# Patient Record
Sex: FEMALE | Race: BLACK OR AFRICAN AMERICAN | Employment: FULL TIME | URBAN - METROPOLITAN AREA
[De-identification: names, ages, dates, MRNs, and addresses within clinical notes are randomized per-mention and may not be internally consistent; named-entity substitution may affect disease eponyms.]

---

## 2017-01-06 ENCOUNTER — HOSPITAL ENCOUNTER (OUTPATIENT)
Dept: MAMMOGRAPHY | Age: 57
Discharge: HOME | End: 2017-01-06
Attending: INTERNAL MEDICINE
Payer: COMMERCIAL

## 2017-01-06 DIAGNOSIS — Z12.31 VISIT FOR SCREENING MAMMOGRAM: ICD-10-CM

## 2017-01-06 PROCEDURE — 77063 BREAST TOMOSYNTHESIS BI: CPT

## 2017-01-10 ENCOUNTER — HOSPITAL ENCOUNTER (EMERGENCY)
Age: 57
Discharge: HOME | End: 2017-01-10
Attending: EMERGENCY MEDICINE
Payer: COMMERCIAL

## 2017-01-10 ENCOUNTER — APPOINTMENT (OUTPATIENT)
Dept: GENERAL RADIOLOGY | Age: 57
End: 2017-01-10
Attending: EMERGENCY MEDICINE
Payer: COMMERCIAL

## 2017-01-10 VITALS
DIASTOLIC BLOOD PRESSURE: 94 MMHG | WEIGHT: 145 LBS | BODY MASS INDEX: 24.89 KG/M2 | HEART RATE: 71 BPM | SYSTOLIC BLOOD PRESSURE: 134 MMHG | TEMPERATURE: 97.7 F | OXYGEN SATURATION: 100 % | RESPIRATION RATE: 16 BRPM

## 2017-01-10 DIAGNOSIS — M25.562 ACUTE PAIN OF LEFT KNEE: Primary | ICD-10-CM

## 2017-01-10 PROCEDURE — 99282 EMERGENCY DEPT VISIT SF MDM: CPT

## 2017-01-10 PROCEDURE — 73564 X-RAY EXAM KNEE 4 OR MORE: CPT

## 2017-01-10 NOTE — ED PROVIDER NOTES
HPI Comments: Patient is a 63 y/o female who presents to the ER c/o left knee pain. Patient states she had a total knee replacement in the summer of 2016. Patient states she slipped and fell on the ice Monday night. She has been able to ambulate but with a slight limp. Patient reports some minor swelling, but controlled with ice. She has also been taking Tramadol as needed. She denied any numbness or tingling, LOC from fall, or other injuries. Patient is a 64 y.o. female presenting with fall. The history is provided by the patient. Fall   The accident occurred 2 days ago. Pertinent negatives include no fever, no abdominal pain, no nausea, no vomiting and no headaches. Past Medical History:   Diagnosis Date    Abdominal adhesions     Anemia     Asthma     Asthma     Diabetes ([address]     Diabetes mellitus     Dyskinesia      bilateral    Essential hypertension     Hypertension     Menopause     Stool color black        Past Surgical History:   Procedure Laterality Date    Tissue localization-excision      Hx hernia repair      Hx cholecystectomy  6/28/10    Hx oophorectomy      Hx hysterectomy       Fibroids    Hx knee replacement           Family History:   Problem Relation Age of Onset    Hypertension Other      parent    Breast Cancer Other 21    Heart Disease Father     Hypertension Father     Hypertension Other      sibling    Diabetes Other      parent       Social History     Social History    Marital status:      Spouse name: N/A    Number of children: N/A    Years of education: N/A     Occupational History    Not on file.      Social History Main Topics    Smoking status: Never Smoker    Smokeless tobacco: Never Used    Alcohol use No    Drug use: No    Sexual activity: Yes     Partners: Male     Birth control/ protection: None     Other Topics Concern    Not on file     Social History Narrative         ALLERGIES: Macrodantin [nitrofurantoin macrocrystalline] and Tape [adhesive]    Review of Systems   Constitutional: Negative for chills, fatigue and fever. HENT: Negative. Negative for sore throat. Eyes: Negative. Respiratory: Negative for cough and shortness of breath. Cardiovascular: Negative for chest pain and palpitations. Gastrointestinal: Negative for abdominal pain, nausea and vomiting. Genitourinary: Negative for dysuria. Musculoskeletal:        Left knee pain     Skin: Negative. Neurological: Negative for dizziness, weakness, light-headedness and headaches. Psychiatric/Behavioral: Negative. All other systems reviewed and are negative. Vitals:    01/10/17 0907   BP: (!) 134/94   Pulse: 71   Resp: 16   Temp: 97.7 °F (36.5 °C)   SpO2: 100%   Weight: 65.8 kg (145 lb)            Physical Exam   Constitutional: She is oriented to person, place, and time. She appears well-developed and well-nourished. HENT:   Head: Normocephalic and atraumatic. Mouth/Throat: Oropharynx is clear and moist.   Eyes: Conjunctivae are normal. No scleral icterus. Neck: Neck supple. No JVD present. No tracheal deviation present. Cardiovascular: Normal rate, regular rhythm and normal heart sounds. Pulmonary/Chest: Effort normal and breath sounds normal. No respiratory distress. She has no wheezes. Abdominal: Soft. Musculoskeletal: Normal range of motion. She exhibits edema and tenderness. Right knee: Normal.        Legs:  Neurological: She is alert and oriented to person, place, and time. She has normal strength. Gait normal. GCS eye subscore is 4. GCS verbal subscore is 5. GCS motor subscore is 6. Skin: Skin is warm and dry. Psychiatric: She has a normal mood and affect. Nursing note and vitals reviewed.        MDM  Number of Diagnoses or Management Options  Diagnosis management comments: 10:16 AM  65 y/o female c/o left knee pain s/p fall on Monday; total knee replacement in summer of 2016 and concerned for further injury. XR negative for fracture or change in replacement location. Discussed results with patient. Will have f/u with Dr. Mitchel Delaney if no improvement in 1 week. Advised to continue using ice as needed for swelling. All questions answered and patient in agreement with plan of care. Will plan for discharge. Shirin Parekh PA-C    Clinical Impression:  Left knee pain, elevated blood pressure    One or more blood pressure readings were noted elevated during the Pt's presentation in the emergency department this date. This abnormal reading has been cited in the Pt's diagnosis, and they have been encouraged to follow up with their primary care physician, or referred to a consultant for further evaluation and treatment.           Amount and/or Complexity of Data Reviewed  Tests in the radiology section of CPT®: ordered and reviewed    Risk of Complications, Morbidity, and/or Mortality  Presenting problems: low  Diagnostic procedures: low  Management options: low    Critical Care  Total time providing critical care: < 30 minutes    Patient Progress  Patient progress: stable    ED Course       Procedures

## 2017-01-10 NOTE — DISCHARGE INSTRUCTIONS
Joint Pain: Care Instructions  Your Care Instructions  Many people have small aches and pains from overuse or injury to muscles and joints. Joint injuries often happen during sports or recreation, work tasks, or projects around the home. An overuse injury can happen when you put too much stress on a joint or when you do an activity that stresses the joint over and over, such as using the computer or rowing a boat. You can take action at home to help your muscles and joints get better. You should feel better in 1 to 2 weeks, but it can take 3 months or more to heal completely. Follow-up care is a key part of your treatment and safety. Be sure to make and go to all appointments, and call your doctor if you are having problems. It's also a good idea to know your test results and keep a list of the medicines you take. How can you care for yourself at home? · Do not put weight on the injured joint for at least a day or two. · For the first day or two after an injury, do not take hot showers or baths, and do not use hot packs. The heat could make swelling worse. · Put ice or a cold pack on the sore joint for 10 to 20 minutes at a time. Try to do this every 1 to 2 hours for the next 3 days (when you are awake) or until the swelling goes down. Put a thin cloth between the ice and your skin. · Wrap the injury in an elastic bandage. Do not wrap it too tightly because this can cause more swelling. · Prop up the sore joint on a pillow when you ice it or anytime you sit or lie down during the next 3 days. Try to keep it above the level of your heart. This will help reduce swelling. · Take an over-the-counter pain medicine, such as acetaminophen (Tylenol), ibuprofen (Advil, Motrin), or naproxen (Aleve). Read and follow all instructions on the label. · After 1 or 2 days of rest, begin moving the joint gently.  While the joint is still healing, you can begin to exercise using activities that do not strain or hurt the painful joint. When should you call for help? Call your doctor now or seek immediate medical care if:  · You have signs of infection, such as:  ¨ Increased pain, swelling, warmth, and redness. ¨ Red streaks leading from the joint. ¨ A fever. Watch closely for changes in your health, and be sure to contact your doctor if:  · Your movement or symptoms are not getting better after 1 to 2 weeks of home treatment. Where can you learn more? Go to http://betzy-didi.info/. Enter P205 in the search box to learn more about \"Joint Pain: Care Instructions. \"  Current as of: May 23, 2016  Content Version: 11.1  © 3783-9499 [employer]. Care instructions adapted under license by [employer] (which disclaims liability or warranty for this information). If you have questions about a medical condition or this instruction, always ask your healthcare professional. [place] [address] any warranty or liability for your use of this information. DASH Diet: Care Instructions  Your Care Instructions  The DASH diet is an eating plan that can help lower your blood pressure. DASH stands for Dietary Approaches to Stop Hypertension. Hypertension is high blood pressure. The DASH diet focuses on eating foods that are high in calcium, potassium, and magnesium. These nutrients can lower blood pressure. The foods that are highest in these nutrients are fruits, vegetables, low-fat dairy products, nuts, seeds, and legumes. But taking calcium, potassium, and magnesium supplements instead of eating foods that are high in those nutrients does not have the same effect. The DASH diet also includes whole grains, fish, and poultry. The DASH diet is one of several lifestyle changes your doctor may recommend to lower your high blood pressure. Your doctor may also want you to decrease the amount of sodium in your diet.  Lowering sodium while following the DASH diet can lower blood pressure even further than just the DASH diet alone. Follow-up care is a key part of your treatment and safety. Be sure to make and go to all appointments, and call your doctor if you are having problems. It's also a good idea to know your test results and keep a list of the medicines you take. How can you care for yourself at home? Following the DASH diet  · Eat 4 to 5 servings of fruit each day. A serving is 1 medium-sized piece of fruit, ½ cup chopped or canned fruit, 1/4 cup dried fruit, or 4 ounces (½ cup) of fruit juice. Choose fruit more often than fruit juice. · Eat 4 to 5 servings of vegetables each day. A serving is 1 cup of lettuce or raw leafy vegetables, ½ cup of chopped or cooked vegetables, or 4 ounces (½ cup) of vegetable juice. Choose vegetables more often than vegetable juice. · Get 2 to 3 servings of low-fat and fat-free dairy each day. A serving is 8 ounces of milk, 1 cup of yogurt, or 1 ½ ounces of cheese. · Eat 6 to 8 servings of grains each day. A serving is 1 slice of bread, 1 ounce of dry cereal, or ½ cup of cooked rice, pasta, or cooked cereal. Try to choose whole-grain products as much as possible. · Limit lean meat, poultry, and fish to 2 servings each day. A serving is 3 ounces, about the size of a deck of cards. · Eat 4 to 5 servings of nuts, seeds, and legumes (cooked dried beans, lentils, and split peas) each week. A serving is 1/3 cup of nuts, 2 tablespoons of seeds, or ½ cup of cooked beans or peas. · Limit fats and oils to 2 to 3 servings each day. A serving is 1 teaspoon of vegetable oil or 2 tablespoons of salad dressing. · Limit sweets and added sugars to 5 servings or less a week. A serving is 1 tablespoon jelly or jam, ½ cup sorbet, or 1 cup of lemonade. · Eat less than 2,300 milligrams (mg) of sodium a day. If you limit your sodium to 1,500 mg a day, you can lower your blood pressure even more. Tips for success  · Start small.  Do not try to make dramatic changes to your diet all at once. You might feel that you are missing out on your favorite foods and then be more likely to not follow the plan. Make small changes, and stick with them. Once those changes become habit, add a few more changes. · Try some of the following:  ¨ Make it a goal to eat a fruit or vegetable at every meal and at snacks. This will make it easy to get the recommended amount of fruits and vegetables each day. ¨ Try yogurt topped with fruit and nuts for a snack or healthy dessert. ¨ Add lettuce, tomato, cucumber, and onion to sandwiches. ¨ Combine a ready-made pizza crust with low-fat mozzarella cheese and lots of vegetable toppings. Try using tomatoes, squash, spinach, broccoli, carrots, cauliflower, and onions. ¨ Have a variety of cut-up vegetables with a low-fat dip as an appetizer instead of chips and dip. ¨ Sprinkle sunflower seeds or chopped almonds over salads. Or try adding chopped walnuts or almonds to cooked vegetables. ¨ Try some vegetarian meals using beans and peas. Add garbanzo or kidney beans to salads. Make burritos and tacos with mashed alamo beans or black beans. Where can you learn more? Go to http://betzy-didi.info/. Enter I924 in the search box to learn more about \"DASH Diet: Care Instructions. \"  Current as of: March 23, 2016  Content Version: 11.1  © 9381-3115 [employer]. Care instructions adapted under license by [employer] (which disclaims liability or warranty for this information). If you have questions about a medical condition or this instruction, always ask your healthcare professional. [address] any warranty or liability for your use of this information.

## 2017-01-22 ENCOUNTER — HOSPITAL ENCOUNTER (EMERGENCY)
Age: 57
Discharge: HOME | End: 2017-01-22
Attending: EMERGENCY MEDICINE
Payer: COMMERCIAL

## 2017-01-22 ENCOUNTER — APPOINTMENT (OUTPATIENT)
Dept: CT IMAGING | Age: 57
End: 2017-01-22
Attending: NURSE PRACTITIONER
Payer: COMMERCIAL

## 2017-01-22 VITALS
RESPIRATION RATE: 16 BRPM | WEIGHT: 150 LBS | BODY MASS INDEX: 25.75 KG/M2 | OXYGEN SATURATION: 100 % | HEART RATE: 62 BPM | TEMPERATURE: 97.9 F | SYSTOLIC BLOOD PRESSURE: 126 MMHG | DIASTOLIC BLOOD PRESSURE: 91 MMHG

## 2017-01-22 DIAGNOSIS — R10.31 ABDOMINAL PAIN, RIGHT LOWER QUADRANT: Primary | ICD-10-CM

## 2017-01-22 LAB
ALBUMIN SERPL BCP-MCNC: 3.7 G/DL (ref 3.4–5)
ALBUMIN/GLOB SERPL: 1 {RATIO} (ref 0.8–1.7)
ALP SERPL-CCNC: 150 U/L (ref 45–117)
ALT SERPL-CCNC: 25 U/L (ref 13–56)
ANION GAP BLD CALC-SCNC: 16 MMOL/L (ref 10–20)
ANION GAP BLD CALC-SCNC: 7 MMOL/L (ref 3–18)
APPEARANCE UR: ABNORMAL
AST SERPL W P-5'-P-CCNC: 16 U/L (ref 15–37)
BACTERIA URNS QL MICRO: NEGATIVE /HPF
BASOPHILS # BLD AUTO: 0 K/UL (ref 0–0.06)
BASOPHILS # BLD: 1 % (ref 0–2)
BILIRUB DIRECT SERPL-MCNC: 0.1 MG/DL (ref 0–0.2)
BILIRUB SERPL-MCNC: 0.4 MG/DL (ref 0.2–1)
BILIRUB UR QL: NEGATIVE
BUN BLD-MCNC: 20 MG/DL (ref 7–18)
BUN SERPL-MCNC: 20 MG/DL (ref 7–18)
BUN/CREAT SERPL: 17 (ref 12–20)
CA-I BLD-MCNC: 1.2 MMOL/L (ref 1.12–1.32)
CALCIUM SERPL-MCNC: 9.3 MG/DL (ref 8.5–10.1)
CHLORIDE BLD-SCNC: 104 MMOL/L (ref 100–108)
CHLORIDE SERPL-SCNC: 102 MMOL/L (ref 100–108)
CO2 BLD-SCNC: 27 MMOL/L (ref 19–24)
CO2 SERPL-SCNC: 30 MMOL/L (ref 21–32)
COLOR UR: YELLOW
CREAT SERPL-MCNC: 1.19 MG/DL (ref 0.6–1.3)
CREAT UR-MCNC: 1.1 MG/DL (ref 0.6–1.3)
DIFFERENTIAL METHOD BLD: NORMAL
EOSINOPHIL # BLD: 0.2 K/UL (ref 0–0.4)
EOSINOPHIL NFR BLD: 3 % (ref 0–5)
EPITH CASTS URNS QL MICRO: NORMAL /LPF (ref 0–5)
ERYTHROCYTE [DISTWIDTH] IN BLOOD BY AUTOMATED COUNT: 13.1 % (ref 11.6–14.5)
GLOBULIN SER CALC-MCNC: 3.6 G/DL (ref 2–4)
GLUCOSE BLD STRIP.AUTO-MCNC: 124 MG/DL (ref 74–106)
GLUCOSE SERPL-MCNC: 121 MG/DL (ref 74–99)
GLUCOSE UR STRIP.AUTO-MCNC: >1000 MG/DL
HCT VFR BLD AUTO: 43 % (ref 35–45)
HCT VFR BLD CALC: 44 % (ref 36–49)
HGB BLD-MCNC: 14.5 G/DL (ref 12–16)
HGB BLD-MCNC: 15 G/DL (ref 12–16)
HGB UR QL STRIP: ABNORMAL
KETONES UR QL STRIP.AUTO: NEGATIVE MG/DL
LEUKOCYTE ESTERASE UR QL STRIP.AUTO: NEGATIVE
LIPASE SERPL-CCNC: 214 U/L (ref 73–393)
LYMPHOCYTES # BLD AUTO: 35 % (ref 21–52)
LYMPHOCYTES # BLD: 1.9 K/UL (ref 0.9–3.6)
MCH RBC QN AUTO: 29.6 PG (ref 24–34)
MCHC RBC AUTO-ENTMCNC: 33.7 G/DL (ref 31–37)
MCV RBC AUTO: 87.8 FL (ref 74–97)
MONOCYTES # BLD: 0.5 K/UL (ref 0.05–1.2)
MONOCYTES NFR BLD AUTO: 9 % (ref 3–10)
NEUTS SEG # BLD: 2.9 K/UL (ref 1.8–8)
NEUTS SEG NFR BLD AUTO: 52 % (ref 40–73)
NITRITE UR QL STRIP.AUTO: NEGATIVE
PH UR STRIP: 6.5 [PH] (ref 5–8)
PLATELET # BLD AUTO: 245 K/UL (ref 135–420)
PMV BLD AUTO: 11.1 FL (ref 9.2–11.8)
POTASSIUM BLD-SCNC: 3.9 MMOL/L (ref 3.5–5.5)
POTASSIUM SERPL-SCNC: 3.8 MMOL/L (ref 3.5–5.5)
PROT SERPL-MCNC: 7.3 G/DL (ref 6.4–8.2)
PROT UR STRIP-MCNC: NEGATIVE MG/DL
RBC # BLD AUTO: 4.9 M/UL (ref 4.2–5.3)
RBC #/AREA URNS HPF: NORMAL /HPF (ref 0–5)
SODIUM BLD-SCNC: 142 MMOL/L (ref 136–145)
SODIUM SERPL-SCNC: 139 MMOL/L (ref 136–145)
SP GR UR REFRACTOMETRY: 1.02 (ref 1–1.03)
UROBILINOGEN UR QL STRIP.AUTO: 0.2 EU/DL (ref 0.2–1)
WBC # BLD AUTO: 5.5 K/UL (ref 4.6–13.2)
WBC URNS QL MICRO: NEGATIVE /HPF (ref 0–4)

## 2017-01-22 PROCEDURE — 81001 URINALYSIS AUTO W/SCOPE: CPT | Performed by: NURSE PRACTITIONER

## 2017-01-22 PROCEDURE — 96376 TX/PRO/DX INJ SAME DRUG ADON: CPT

## 2017-01-22 PROCEDURE — 74011250636 HC RX REV CODE- 250/636: Performed by: NURSE PRACTITIONER

## 2017-01-22 PROCEDURE — 99284 EMERGENCY DEPT VISIT MOD MDM: CPT

## 2017-01-22 PROCEDURE — 83690 ASSAY OF LIPASE: CPT | Performed by: NURSE PRACTITIONER

## 2017-01-22 PROCEDURE — 80048 BASIC METABOLIC PNL TOTAL CA: CPT | Performed by: NURSE PRACTITIONER

## 2017-01-22 PROCEDURE — 80076 HEPATIC FUNCTION PANEL: CPT | Performed by: NURSE PRACTITIONER

## 2017-01-22 PROCEDURE — 96361 HYDRATE IV INFUSION ADD-ON: CPT

## 2017-01-22 PROCEDURE — 96374 THER/PROPH/DIAG INJ IV PUSH: CPT

## 2017-01-22 PROCEDURE — 85025 COMPLETE CBC W/AUTO DIFF WBC: CPT | Performed by: NURSE PRACTITIONER

## 2017-01-22 PROCEDURE — 80047 BASIC METABLC PNL IONIZED CA: CPT

## 2017-01-22 PROCEDURE — 74177 CT ABD & PELVIS W/CONTRAST: CPT

## 2017-01-22 PROCEDURE — 74011636320 HC RX REV CODE- 636/320: Performed by: EMERGENCY MEDICINE

## 2017-01-22 RX ORDER — KETOROLAC TROMETHAMINE 30 MG/ML
30 INJECTION, SOLUTION INTRAMUSCULAR; INTRAVENOUS
Status: COMPLETED | OUTPATIENT
Start: 2017-01-22 | End: 2017-01-22

## 2017-01-22 RX ORDER — DOCUSATE SODIUM 100 MG/1
100 CAPSULE, LIQUID FILLED ORAL 2 TIMES DAILY
Qty: 60 CAP | Refills: 2 | Status: SHIPPED | OUTPATIENT
Start: 2017-01-22 | End: 2017-04-22

## 2017-01-22 RX ORDER — OXYCODONE AND ACETAMINOPHEN 5; 325 MG/1; MG/1
1 TABLET ORAL
Qty: 20 TAB | Refills: 0 | Status: SHIPPED | OUTPATIENT
Start: 2017-01-22 | End: 2017-01-29

## 2017-01-22 RX ORDER — ONDANSETRON 4 MG/1
4 TABLET, FILM COATED ORAL
Qty: 20 TAB | Refills: 0 | Status: SHIPPED | OUTPATIENT
Start: 2017-01-22 | End: 2017-04-18

## 2017-01-22 RX ORDER — POLYETHYLENE GLYCOL 3350 17 G/17G
17 POWDER, FOR SOLUTION ORAL DAILY
Qty: 238 G | Refills: 0 | Status: SHIPPED | OUTPATIENT
Start: 2017-01-22 | End: 2017-01-22

## 2017-01-22 RX ORDER — POLYETHYLENE GLYCOL 3350 17 G/17G
17 POWDER, FOR SOLUTION ORAL DAILY
Qty: 238 G | Refills: 0 | Status: SHIPPED | OUTPATIENT
Start: 2017-01-22 | End: 2017-03-02

## 2017-01-22 RX ADMIN — KETOROLAC TROMETHAMINE 30 MG: 30 INJECTION, SOLUTION INTRAMUSCULAR at 19:26

## 2017-01-22 RX ADMIN — SODIUM CHLORIDE 1000 ML: 900 INJECTION, SOLUTION INTRAVENOUS at 13:41

## 2017-01-22 RX ADMIN — KETOROLAC TROMETHAMINE 30 MG: 30 INJECTION, SOLUTION INTRAMUSCULAR at 14:17

## 2017-01-22 RX ADMIN — IOPAMIDOL 90 ML: 612 INJECTION, SOLUTION INTRAVENOUS at 16:28

## 2017-01-22 RX ADMIN — SODIUM CHLORIDE 1000 ML: 900 INJECTION, SOLUTION INTRAVENOUS at 14:56

## 2017-01-22 NOTE — ED PROVIDER NOTES
HPI Comments: 1:21 PM Tierney Cristobal is a 64 y.o. Female with a history of diabetes, hypertension, and asthma presenting to the ED with RLQ pain that radiates to the R flank that began 3 day ago. The patient also reports increased urinary frequency and constipation as associated symptoms. Her last BM was 3 days ago. Pt states that she has experienced nausea with her symptoms and that she has had no appetite today. Pain in area is exacerbated by palpation and the \"bumpy car ride\" here. Pt denies vomiting, diarrhea, fever, CP, and cough. Patient is a 64 y.o. female presenting with flank pain and frequency. The history is provided by the patient. Flank Pain    Associated symptoms include abdominal pain. Pertinent negatives include no chest pain, no fever, no numbness, no headaches and no dysuria. Urinary Frequency    Associated symptoms include frequency, flank pain and abdominal pain. Pertinent negatives include no chills, no nausea, no vomiting, no hematuria, no urgency, no vaginal discharge and no back pain.         Past Medical History:   Diagnosis Date    Abdominal adhesions     Anemia     Asthma     Asthma     Diabetes ([address]     Diabetes mellitus     Dyskinesia      bilateral    Essential hypertension     Hypertension     Menopause     Stool color black        Past Surgical History:   Procedure Laterality Date    Tissue localization-excision      Hx hernia repair      Hx cholecystectomy  6/28/10    Hx oophorectomy      Hx hysterectomy       Fibroids    Hx knee replacement           Family History:   Problem Relation Age of Onset    Hypertension Other      parent    Breast Cancer Other 21    Heart Disease Father     Hypertension Father     Hypertension Other      sibling    Diabetes Other      parent       Social History     Social History    Marital status:      Spouse name: N/A    Number of children: N/A    Years of education: N/A Occupational History    Not on file. Social History Main Topics    Smoking status: Never Smoker    Smokeless tobacco: Never Used    Alcohol use No    Drug use: No    Sexual activity: Yes     Partners: Male     Birth control/ protection: None     Other Topics Concern    Not on file     Social History Narrative         ALLERGIES: Macrodantin [nitrofurantoin macrocrystalline] and Tape [adhesive]    Review of Systems   Constitutional: Negative for appetite change, chills and fever. HENT: Negative for congestion, sinus pressure and trouble swallowing. Eyes: Negative for pain and visual disturbance. Respiratory: Negative for cough, chest tightness, shortness of breath and wheezing. Cardiovascular: Negative for chest pain, palpitations and leg swelling. Gastrointestinal: Positive for abdominal pain and constipation. Negative for diarrhea, nausea and vomiting. RLQ pain to direct pressure and rebound. + obturator sign   Endocrine: Negative. Genitourinary: Positive for flank pain and frequency. Negative for difficulty urinating, dysuria, hematuria, urgency and vaginal discharge. Musculoskeletal: Negative for arthralgias, back pain, myalgias and neck pain. Skin: Negative. Allergic/Immunologic: Negative. Neurological: Negative for dizziness, syncope, numbness and headaches. Hematological: Negative. Psychiatric/Behavioral: Negative. All other systems reviewed and are negative. There were no vitals filed for this visit. Physical Exam   Constitutional: She is oriented to person, place, and time. She appears well-developed and well-nourished. No distress. HENT:   Head: Normocephalic and atraumatic. Right Ear: External ear normal.   Left Ear: External ear normal.   Mouth/Throat: Oropharynx is clear and moist.   Eyes: Conjunctivae and EOM are normal. Pupils are equal, round, and reactive to light. Neck: Normal range of motion. Neck supple. No JVD present.  No tracheal deviation present. No thyromegaly present. Cardiovascular: Normal rate, regular rhythm and normal heart sounds. Exam reveals no gallop and no friction rub. No murmur heard. Pulmonary/Chest: Effort normal and breath sounds normal. No stridor. No respiratory distress. She has no wheezes. She has no rales. She exhibits no tenderness. Abdominal: Soft. Bowel sounds are normal. She exhibits no distension. There is tenderness. There is rebound. RlQ pain to direct palpation with rebound pain. +obturator's sign   Musculoskeletal: Normal range of motion. She exhibits no edema or tenderness. Lymphadenopathy:     She has no cervical adenopathy. Neurological: She is alert and oriented to person, place, and time. No cranial nerve deficit. Skin: Skin is warm. She is not diaphoretic. Psychiatric: She has a normal mood and affect. Her behavior is normal. Judgment and thought content normal.   Nursing note and vitals reviewed. MDM  Number of Diagnoses or Management Options  Abdominal pain, right lower quadrant:   Diagnosis management comments: Pt with significant RLQ pain to direct palpation with rebound tenderness, +heel jar and obturator's sign, low grade temp and anorexia-all suspicious for appendicitis. Mild elevated BUN, creatinine without history of renal disease-discussed with CT tech who stated to iv hydrate pre and post CT-Pt received 1L NS pre CT. Pain is mildly improved with Toradol    Ct prelim report- appendix not completely visualized but no secondary signs of inflammation or stranding. Discussed with Dr. Maria T Martin and attending rad read requested. Pt  in RLQ but seems to also involve right upper quadrant too. Pt has not had bowel movement in 3 days. She is afebrile at this point. 7:12 PM  Called rad resident Dr. Susanne Ryan, who stated that previous rad resident discussed with Dr. Hannah Cuellar, who concurred with rad resident reading.  Again discussed with Dr. Maria T Martin and requested he examine pt which he did and instructed to discharge patient. Pt given return instructions if pain persists in 24 hours or if fever develops for recheck for possible early appendicitis. Pt verbalized understanding of instructions and agrees. Amount and/or Complexity of Data Reviewed  Clinical lab tests: ordered and reviewed  Tests in the radiology section of CPT®: ordered and reviewed    Risk of Complications, Morbidity, and/or Mortality  Presenting problems: moderate  Diagnostic procedures: moderate  Management options: moderate    Patient Progress  Patient progress: stable    ED Course       Procedures    Vitals:  Patient Vitals for the past 12 hrs:   Temp Pulse Resp BP SpO2   01/22/17 1322 99.5 °F (37.5 °C) 85 16 118/85 100 %         Medications ordered:   Medications - No data to display      Lab findings:  No results found for this or any previous visit (from the past 12 hour(s)). EKG interpretation by ED Physician:      X-Ray, CT or other radiology findings or impressions:  No orders to display       Progress notes, Consult notes or additional Procedure notes:       Reevaluation of patient:       Disposition:  Diagnosis: No diagnosis found. Disposition:     Follow-up Information     None             SCRIBE ATTESTATION STATEMENT  Documented by: Matty strong for, and in the presence of, Lise Arzola NP 1:24 PM       Signed by: Jeremy Adams, 01/22/17      PROVIDER ATTESTATION STATEMENT  I personally performed the services described in the documentation, reviewed the documentation, as recorded by the scribe in my presence, and it accurately and completely records my words and actions. Lise Arzola NP                         Diagnosis:   1.  Abdominal pain, right lower quadrant          Disposition: home      Follow-up Information     Follow up With Details Comments Contact Info    N Janet Severs, MD Schedule an appointment as soon as possible for a visit Return to the ED, If symptoms worsen [facility]  [facility] [phone]      [facility] EMERGENCY DEPT In 1 day  [address]    [facility] EMERGENCY DEPT  If symptoms worsen [address] [facility] [address] [address] [phone]          Patient's Medications   Start Taking    DOCUSATE SODIUM (COLACE) 100 MG CAPSULE    Take 1 Cap by mouth two (2) times a day for 90 days. ONDANSETRON HCL (ZOFRAN, AS HYDROCHLORIDE,) 4 MG TABLET    Take 1 Tab by mouth every eight (8) hours as needed for Nausea. OXYCODONE-ACETAMINOPHEN (PERCOCET) 5-325 MG PER TABLET    Take 1 Tab by mouth every six (6) hours as needed for Pain (1 to 2 tablets) for up to 7 days. Max Daily Amount: 4 Tabs. POLYETHYLENE GLYCOL (MIRALAX) 17 GRAM/DOSE POWDER    Take 17 g by mouth daily. 1 tablespoon with 8 oz of water daily   Continue Taking    ALBUTEROL (PROVENTIL HFA, VENTOLIN HFA, PROAIR HFA) 90 MCG/ACTUATION INHALER    Take 1 Puff by inhalation every six (6) hours as needed for Wheezing. AMLODIPINE (NORVASC) 2.5 MG TABLET    Take 2.5 mg by mouth nightly. ATORVASTATIN (LIPITOR) 20 MG TABLET    Take 40 mg by mouth daily. BISOPROLOL-HYDROCHLOROTHIAZIDE (ZIAC) 2.5-6.25 MG PER TABLET    Take 1 Tab by mouth daily. DOXEPIN (SINEQUAN) 10 MG CAPSULE    Take  by mouth nightly. METFORMIN (GLUMETZA) 500 MG TG24 24 HOUR TABLET    Take 500 mg by mouth two (2) times a day. OMEPRAZOLE (PRILOSEC) 10 MG CAPSULE    Take 10 mg by mouth daily. These Medications have changed    No medications on file   Stop Taking    CYCLOBENZAPRINE (FLEXERIL) 5 MG TABLET    Take 1 Tab by mouth nightly. HYDROCODONE-ACETAMINOPHEN (NORCO) 5-325 MG PER TABLET    Take 1 Tab by mouth every four (4) hours as needed for Pain. Max Daily Amount: 6 Tabs. NAPROXEN (NAPROSYN) 500 MG TABLET    Take 1 Tab by mouth two (2) times daily (with meals).

## 2017-01-23 NOTE — ED NOTES
Patient helped to bathroom with use of wheelchair. Patient movement more limited and pt grunting with effort and pain during movement. MD Kassy Curiel made aware. No orders received at this time, per Kassy Curiel MD patient is ready for discharge.

## 2017-01-23 NOTE — ED NOTES
Purposeful rounding completed:    Side rails up x 2:  YES  Bed in low position and wheels locked: YES  Call bell within reach: YES  Comfort addressed: YES    Toileting needs addressed: YES  Plan of care reviewed/updated with patient and or family members: YES  IV site assessed: YES  Pain assessed and addressed: YES, 3

## 2017-01-23 NOTE — ED NOTES
Patient medicated per [facility], allergies verified with patient prior to medication administration. Patient resting comfortably at this time.

## 2017-01-23 NOTE — ED NOTES
Purposeful rounding completed:    Side rails up x 2:  YES  Bed in low position and wheels locked: YES  Call bell within reach: YES  Comfort addressed: YES    Toileting needs addressed: YES  Plan of care reviewed/updated with patient and or family members: YES  IV site assessed: YES  Pain assessed and addressed: YES, 5

## 2017-01-23 NOTE — ED NOTES
Purposeful rounding completed:    Side rails up x 2:  YES  Bed in low position and wheels locked: YES  Call bell within reach: YES  Comfort addressed: YES    Toileting needs addressed: YES  Plan of care reviewed/updated with patient and or family members: YES  IV site assessed: YES  Pain assessed and addressed: YES, 4

## 2017-01-23 NOTE — DISCHARGE INSTRUCTIONS
Constipation: Care Instructions  Your Care Instructions  Constipation means that you have a hard time passing stools (bowel movements). People pass stools from 3 times a day to once every 3 days. What is normal for you may be different. Constipation may occur with pain in the rectum and cramping. The pain may get worse when you try to pass stools. Sometimes there are small amounts of bright red blood on toilet paper or the surface of stools. This is because of enlarged veins near the rectum (hemorrhoids). A few changes in your diet and lifestyle may help you avoid ongoing constipation. Your doctor may also prescribe medicine to help loosen your stool. Some medicines can cause constipation. These include pain medicines and antidepressants. Tell your doctor about all the medicines you take. Your doctor may want to make a medicine change to ease your symptoms. Follow-up care is a key part of your treatment and safety. Be sure to make and go to all appointments, and call your doctor if you are having problems. It's also a good idea to know your test results and keep a list of the medicines you take. How can you care for yourself at home? · Drink plenty of fluids, enough so that your urine is light yellow or clear like water. If you have kidney, heart, or liver disease and have to limit fluids, talk with your doctor before you increase the amount of fluids you drink. · Include high-fiber foods in your diet each day. These include fruits, vegetables, beans, and whole grains. · Get at least 30 minutes of exercise on most days of the week. Walking is a good choice. You also may want to do other activities, such as running, swimming, cycling, or playing tennis or team sports. · Take a fiber supplement, such as Citrucel or Metamucil, every day. Read and follow all instructions on the label. · Schedule time each day for a bowel movement. A daily routine may help.  Take your time having your bowel movement. · Support your feet with a small step stool when you sit on the toilet. This helps flex your hips and places your pelvis in a squatting position. · Your doctor may recommend an over-the-counter laxative to relieve your constipation. Examples are Milk of Magnesia and MiraLax. Read and follow all instructions on the label. Do not use laxatives on a long-term basis. When should you call for help? Call your doctor now or seek immediate medical care if:  · You have new or worse belly pain. · You have new or worse nausea or vomiting. · You have blood in your stools. Watch closely for changes in your health, and be sure to contact your doctor if:  · Your constipation is getting worse. · You do not get better as expected. Where can you learn more? Go to http://betzy-didi.info/. Enter [id number] in the search box to learn more about \"Constipation: Care Instructions. \"  Current as of: May 27, 2016  Content Version: 11.1  © 2546-1077 [employer] Care instructions adapted under license by [employer] (which disclaims liability or warranty for this information). If you have questions about a medical condition or this instruction, always ask your healthcare professional. [address] any warranty or liability for your use of this information. Abdominal Pain: Care Instructions  Your Care Instructions    Abdominal pain has many possible causes. Some aren't serious and get better on their own in a few days. Others need more testing and treatment. If your pain continues or gets worse, you need to be rechecked and may need more tests to find out what is wrong. You may need surgery to correct the problem. Don't ignore new symptoms, such as fever, nausea and vomiting, urination problems, pain that gets worse, and dizziness. These may be signs of a more serious problem. Your doctor may have recommended a follow-up visit in the next 8 to 12 hours.  If you are not getting better, you may need more tests or treatment. The doctor has checked you carefully, but problems can develop later. If you notice any problems or new symptoms, get medical treatment right away. Follow-up care is a key part of your treatment and safety. Be sure to make and go to all appointments, and call your doctor if you are having problems. It's also a good idea to know your test results and keep a list of the medicines you take. How can you care for yourself at home? · Rest until you feel better. · To prevent dehydration, drink plenty of fluids, enough so that your urine is light yellow or clear like water. Choose water and other caffeine-free clear liquids until you feel better. If you have kidney, heart, or liver disease and have to limit fluids, talk with your doctor before you increase the amount of fluids you drink. · If your stomach is upset, eat mild foods, such as rice, dry toast or crackers, bananas, and applesauce. Try eating several small meals instead of two or three large ones. · Wait until 48 hours after all symptoms have gone away before you have spicy foods, alcohol, and drinks that contain caffeine. · Do not eat foods that are high in fat. · Avoid anti-inflammatory medicines such as aspirin, ibuprofen (Advil, Motrin), and naproxen (Aleve). These can cause stomach upset. Talk to your doctor if you take daily aspirin for another health problem. When should you call for help? Call 911 anytime you think you may need emergency care. For example, call if:  · You passed out (lost consciousness). · You pass maroon or very bloody stools. · You vomit blood or what looks like coffee grounds. · You have new, severe belly pain. Call your doctor now or seek immediate medical care if:  · Your pain gets worse, especially if it becomes focused in one area of your belly. · You have a new or higher fever.   · Your stools are black and look like tar, or they have streaks of blood.  · You have unexpected vaginal bleeding. · You have symptoms of a urinary tract infection. These may include:  ¨ Pain when you urinate. ¨ Urinating more often than usual.  ¨ Blood in your urine. · You are dizzy or lightheaded, or you feel like you may faint. Watch closely for changes in your health, and be sure to contact your doctor if:  · You are not getting better after 1 day (24 hours). Where can you learn more? Go to http://betzy-didi.info/. Enter Y745 in the search box to learn more about \"Abdominal Pain: Care Instructions. \"  Current as of: May 27, 2016  Content Version: 11.1  © 9126-2536 [employer]. Care instructions adapted under license by [employer] (which disclaims liability or warranty for this information). If you have questions about a medical condition or this instruction, always ask your healthcare professional. [place] [address] any warranty or liability for your use of this information. Possible Appendicitis: Care Instructions  Your Care Instructions    Your doctor thinks you may have appendicitis. This means that your appendix may be infected. The appendix is a small sac that is shaped like a finger. It's attached to your large intestine. Sometimes it's hard to tell if a person has appendicitis. It is especially hard to tell in children, pregnant women, and older adults. If your doctor thinks it's possible that you have appendicitis, he or she may want to order more tests. Or your doctor may want to wait to see if your symptoms change. Your doctor thinks it's okay for you to go home right now. But you will need to watch for symptoms of appendicitis at home. If your symptoms continue or get worse, it's important to call your doctor or get medical care right away. Appendicitis can get serious very quickly. The main treatment is surgery to remove your appendix.   Follow-up care is a key part of your treatment and safety. Be sure to make and go to all appointments, and call your doctor if you are having problems. It's also a good idea to know your test results and keep a list of the medicines you take. How can you care for yourself at home? · Do not eat or drink, unless your doctor says it is okay. If you need surgery, it's best to have an empty stomach. If you're thirsty, you can rinse your mouth with water. Or you can suck on hard candy. · Do not take laxatives. If you have appendicitis, they can make the appendix burst.  · Follow your doctor's instructions about taking medicines. Your doctor may tell you not to take antibiotics or pain pills. These medicines can make it harder to tell if you have appendicitis. · Watch for symptoms of appendicitis. See the When should you call for help section below. It is very important to follow your doctor's instructions about getting treatment if you have these symptoms. When should you call for help? Call 911 anytime you think you may need emergency care. For example, call if:  · You passed out (lost consciousness). · You have new, severe belly pain and feel weak. Call your doctor now or seek immediate medical care if:  · You have belly pain below your belly button on the right side of your belly. · You have belly pain that gets worse when you move, walk, or cough. · Your belly pain does not get better after a few days. · You have a fever over 100°F.  · You are sick to your stomach or can't keep fluids down. · You have trouble passing gas or stools. · Your belly is bloated or swollen. Watch closely for changes in your health, and be sure to contact your doctor if:  · You do not get better as expected. Where can you learn more? Go to http://betzy-iddi.info/. Enter I777 in the search box to learn more about \"Possible Appendicitis: Care Instructions. \"  Current as of: August 9, 2016  Content Version: 11.1  © 9072-3418 [employer], [facility].  Care instructions adapted under license by [employer] (which disclaims liability or warranty for this information). If you have questions about a medical condition or this instruction, always ask your healthcare professional. [place] [address] any warranty or liability for your use of this information. e(ye)BRAIN Activation    Thank you for requesting access to e(ye)BRAIN. Please follow the instructions below to securely access and download your online medical record. e(ye)BRAIN allows you to send messages to your doctor, view your test results, renew your prescriptions, schedule appointments, and more. How Do I Sign Up? 1. In your internet browser, go to [email or web address]  2. Click on the First Time User? Click Here link in the Sign In box. You will be redirect to the New Member Sign Up page. 3. Enter your e(ye)BRAIN Access Code exactly as it appears below. You will not need to use this code after youve completed the sign-up process. If you do not sign up before the expiration date, you must request a new code. e(ye)BRAIN Access Code: [email or web address]  Expires: 2017  1:41 PM (This is the date your e(ye)BRAIN access code will )    4. Enter the last four digits of your Social Security Number (xxxx) and Date of Birth (mm/dd/yyyy) as indicated and click Submit. You will be taken to the next sign-up page. 5. Create a e(ye)BRAIN ID. This will be your e(ye)BRAIN login ID and cannot be changed, so think of one that is secure and easy to remember. 6. Create a e(ye)BRAIN password. You can change your password at any time. 7. Enter your Password Reset Question and Answer. This can be used at a later time if you forget your password. 8. Enter your e-mail address. You will receive e-mail notification when new information is available in [address] 9. Click Sign Up. You can now view and download portions of your medical record.   10. Click the Download Summary menu link to download a portable copy of your medical information. Additional Information    If you have questions, please visit the Frequently Asked Questions section of the [employer] website at [email or web address] Remember, [facility] is NOT to be used for urgent needs. For medical emergencies, dial 911.

## 2017-01-23 NOTE — ED NOTES
Patient medicated per [facility], allergies verified with patient prior to medication administration.

## 2017-01-23 NOTE — ED NOTES
I have reviewed discharge instruction and prescriptions with patient. patient verbalized understanding and has no further questions at this time. Education taught and patient verbalized understanding of education. Teach back method used. PIV IV removed, catheter tip intact on removal.      Patients pain 8/10 at time of discharge. Belongings given to patient. Patient discharged with family to home.

## 2017-02-06 ENCOUNTER — APPOINTMENT (OUTPATIENT)
Dept: CT IMAGING | Age: 57
End: 2017-02-06
Attending: NURSE PRACTITIONER
Payer: COMMERCIAL

## 2017-02-06 ENCOUNTER — HOSPITAL ENCOUNTER (EMERGENCY)
Age: 57
Discharge: HOME | End: 2017-02-07
Attending: EMERGENCY MEDICINE
Payer: COMMERCIAL

## 2017-02-06 DIAGNOSIS — R10.31 ABDOMINAL PAIN, RIGHT LOWER QUADRANT: ICD-10-CM

## 2017-02-06 DIAGNOSIS — K56.7 ILEUS OF UNSPECIFIED TYPE (HCC): Primary | ICD-10-CM

## 2017-02-06 LAB
ANION GAP BLD CALC-SCNC: 6 MMOL/L (ref 3–18)
APPEARANCE UR: CLEAR
BACTERIA URNS QL MICRO: NEGATIVE /HPF
BASOPHILS # BLD AUTO: 0 K/UL (ref 0–0.06)
BASOPHILS # BLD: 1 % (ref 0–2)
BILIRUB UR QL: NEGATIVE
BUN SERPL-MCNC: 20 MG/DL (ref 7–18)
BUN/CREAT SERPL: 16 (ref 12–20)
CALCIUM SERPL-MCNC: 9.7 MG/DL (ref 8.5–10.1)
CHLORIDE SERPL-SCNC: 106 MMOL/L (ref 100–108)
CO2 SERPL-SCNC: 32 MMOL/L (ref 21–32)
COLOR UR: YELLOW
CREAT SERPL-MCNC: 1.25 MG/DL (ref 0.6–1.3)
DIFFERENTIAL METHOD BLD: NORMAL
EOSINOPHIL # BLD: 0.1 K/UL (ref 0–0.4)
EOSINOPHIL NFR BLD: 2 % (ref 0–5)
EPITH CASTS URNS QL MICRO: NORMAL /LPF (ref 0–5)
ERYTHROCYTE [DISTWIDTH] IN BLOOD BY AUTOMATED COUNT: 13.1 % (ref 11.6–14.5)
GLUCOSE SERPL-MCNC: 112 MG/DL (ref 74–99)
GLUCOSE UR STRIP.AUTO-MCNC: >1000 MG/DL
HCT VFR BLD AUTO: 41.1 % (ref 35–45)
HGB BLD-MCNC: 13.6 G/DL (ref 12–16)
HGB UR QL STRIP: ABNORMAL
KETONES UR QL STRIP.AUTO: ABNORMAL MG/DL
LEUKOCYTE ESTERASE UR QL STRIP.AUTO: NEGATIVE
LYMPHOCYTES # BLD AUTO: 42 % (ref 21–52)
LYMPHOCYTES # BLD: 2.7 K/UL (ref 0.9–3.6)
MCH RBC QN AUTO: 29.2 PG (ref 24–34)
MCHC RBC AUTO-ENTMCNC: 33.1 G/DL (ref 31–37)
MCV RBC AUTO: 88.4 FL (ref 74–97)
MONOCYTES # BLD: 0.6 K/UL (ref 0.05–1.2)
MONOCYTES NFR BLD AUTO: 10 % (ref 3–10)
NEUTS SEG # BLD: 3 K/UL (ref 1.8–8)
NEUTS SEG NFR BLD AUTO: 45 % (ref 40–73)
NITRITE UR QL STRIP.AUTO: NEGATIVE
PH UR STRIP: 5 [PH] (ref 5–8)
PLATELET # BLD AUTO: 250 K/UL (ref 135–420)
PMV BLD AUTO: 10.7 FL (ref 9.2–11.8)
POTASSIUM SERPL-SCNC: 4.1 MMOL/L (ref 3.5–5.5)
PROT UR STRIP-MCNC: NEGATIVE MG/DL
RBC # BLD AUTO: 4.65 M/UL (ref 4.2–5.3)
RBC #/AREA URNS HPF: NORMAL /HPF (ref 0–5)
SODIUM SERPL-SCNC: 144 MMOL/L (ref 136–145)
SP GR UR REFRACTOMETRY: >1.03 (ref 1–1.03)
UROBILINOGEN UR QL STRIP.AUTO: 0.2 EU/DL (ref 0.2–1)
WBC # BLD AUTO: 6.5 K/UL (ref 4.6–13.2)
WBC URNS QL MICRO: NORMAL /HPF (ref 0–4)

## 2017-02-06 PROCEDURE — 80048 BASIC METABOLIC PNL TOTAL CA: CPT | Performed by: NURSE PRACTITIONER

## 2017-02-06 PROCEDURE — 96361 HYDRATE IV INFUSION ADD-ON: CPT

## 2017-02-06 PROCEDURE — 96374 THER/PROPH/DIAG INJ IV PUSH: CPT

## 2017-02-06 PROCEDURE — 96376 TX/PRO/DX INJ SAME DRUG ADON: CPT

## 2017-02-06 PROCEDURE — 85025 COMPLETE CBC W/AUTO DIFF WBC: CPT | Performed by: NURSE PRACTITIONER

## 2017-02-06 PROCEDURE — 74011250636 HC RX REV CODE- 250/636: Performed by: NURSE PRACTITIONER

## 2017-02-06 PROCEDURE — 74011636320 HC RX REV CODE- 636/320: Performed by: NURSE PRACTITIONER

## 2017-02-06 PROCEDURE — 81001 URINALYSIS AUTO W/SCOPE: CPT | Performed by: EMERGENCY MEDICINE

## 2017-02-06 PROCEDURE — 96375 TX/PRO/DX INJ NEW DRUG ADDON: CPT

## 2017-02-06 PROCEDURE — 99284 EMERGENCY DEPT VISIT MOD MDM: CPT

## 2017-02-06 PROCEDURE — 74177 CT ABD & PELVIS W/CONTRAST: CPT

## 2017-02-06 RX ORDER — MORPHINE SULFATE 2 MG/ML
2 INJECTION, SOLUTION INTRAMUSCULAR; INTRAVENOUS
Status: DISCONTINUED | OUTPATIENT
Start: 2017-02-06 | End: 2017-02-07 | Stop reason: HOSPADM

## 2017-02-06 RX ORDER — ONDANSETRON 2 MG/ML
4 INJECTION INTRAMUSCULAR; INTRAVENOUS
Status: COMPLETED | OUTPATIENT
Start: 2017-02-06 | End: 2017-02-06

## 2017-02-06 RX ORDER — MORPHINE SULFATE 4 MG/ML
4 INJECTION, SOLUTION INTRAMUSCULAR; INTRAVENOUS
Status: COMPLETED | OUTPATIENT
Start: 2017-02-06 | End: 2017-02-06

## 2017-02-06 RX ADMIN — SODIUM CHLORIDE 1000 ML: 900 INJECTION, SOLUTION INTRAVENOUS at 22:48

## 2017-02-06 RX ADMIN — ONDANSETRON 4 MG: 2 INJECTION INTRAMUSCULAR; INTRAVENOUS at 21:51

## 2017-02-06 RX ADMIN — IOHEXOL 50 ML: 240 INJECTION, SOLUTION INTRATHECAL; INTRAVASCULAR; INTRAVENOUS; ORAL at 22:48

## 2017-02-06 RX ADMIN — Medication 4 MG: at 21:52

## 2017-02-06 NOTE — LETTER
[facility] EMERGENCY DEPT 
[facility] [id number] 
[phone] Work/School Note Date: 2/6/2017 To Whom It May concern: 
 
Gwendolyn Salvador was seen and treated today in the emergency room by the following provider(s): 
Attending Provider: Renee Alicea MD 
Nurse Practitioner: Lilia Pruitt NP. Gwendolyn Salvador may return to work on 02/09/2017. Sincerely, Lilia Pruitt NP

## 2017-02-07 VITALS
BODY MASS INDEX: 26.16 KG/M2 | OXYGEN SATURATION: 100 % | HEART RATE: 85 BPM | SYSTOLIC BLOOD PRESSURE: 130 MMHG | DIASTOLIC BLOOD PRESSURE: 81 MMHG | TEMPERATURE: 98.2 F | WEIGHT: 157 LBS | RESPIRATION RATE: 16 BRPM | HEIGHT: 65 IN

## 2017-02-07 PROCEDURE — 74011636320 HC RX REV CODE- 636/320: Performed by: EMERGENCY MEDICINE

## 2017-02-07 RX ORDER — HYDROCODONE BITARTRATE AND ACETAMINOPHEN 5; 325 MG/1; MG/1
1 TABLET ORAL
Qty: 12 TAB | Refills: 0 | Status: SHIPPED | OUTPATIENT
Start: 2017-02-07 | End: 2017-02-16

## 2017-02-07 RX ADMIN — IOPAMIDOL 90 ML: 612 INJECTION, SOLUTION INTRAVENOUS at 00:22

## 2017-02-07 NOTE — ED NOTES
Patient sleeping.      Purposeful rounding completed:    Side rails up x 1:  YES  Bed low and wheels and locked: YES  Call bell in reach: YES  Comfort addressed: YES    Toileting needs addressed: YES  Plan of care reviewed/updated with patient and or family members: YES  IV site assessed: YES  Pain assessed and addressed: YES,  Visual 0

## 2017-02-07 NOTE — ED NOTES
Patient states she was treated here about 5 days ago. Patient was discharged and told to follow up with PCP. Patient was told by PCP if anything worsened to come back to the Emergency Room. Patient states that the RLQ pain has increased. Patient states she has an increase in frequency and also has dysuria. Patient is tearful and states that the pain comes in waves. Purposeful rounding completed:    Side rails up x 1:  YES  Bed low and wheels and locked: YES  Call bell in reach: YES  Comfort addressed: YES    Toileting needs addressed: YES  Plan of care reviewed/updated with patient and or family members: YES  IV site assessed: YES  Pain assessed and addressed: YES, 7.

## 2017-02-07 NOTE — ED PROVIDER NOTES
HPI Comments:   9:11 PM   62 y.o. female presents to ED C/O abdominal pain, RLQ. Patient has a HX of anemia, ashtma, abdominal adhesions with laparoscopic removal of many, HTN, diabetes, tummy tuck, cholecystectomy, hysterectomy. Patient reports she was seen for a similar complaint two weeks ago, symptoms had resolved but sudden onset of RLQ pain started again yesterday and patient reports the pain is worse today than when she was seen two weeks ago. Patient reports RLQ that radiates to right lower flank region. Patient denies dysuria but reports foul smelling odor. Patient denies fever, N/V/D, constipation, CP, SOB. 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 Patient is a nonsmoker. Pt denies any other sxs or complaints. Written by Pavan SOSA      The history is provided by the patient. History limited by: No language barrier. Past Medical History:   Diagnosis Date    Abdominal adhesions     Anemia     Asthma     Asthma     Diabetes ([facility] [address]     Diabetes mellitus     Dyskinesia      bilateral    Essential hypertension     Hypertension     Menopause     Stool color black        Past Surgical History:   Procedure Laterality Date    Tissue localization-excision      Hx hernia repair      Hx cholecystectomy  6/28/10    Hx oophorectomy      Hx hysterectomy       Fibroids    Hx knee replacement           Family History:   Problem Relation Age of Onset    Hypertension Other      parent    Breast Cancer Other 21    Heart Disease Father     Hypertension Father     Hypertension Other      sibling    Diabetes Other      parent       Social History     Social History    Marital status:      Spouse name: N/A    Number of children: N/A    Years of education: N/A     Occupational History    Not on file.      Social History Main Topics    Smoking status: Never Smoker    Smokeless tobacco: Never Used    Alcohol use No    Drug use: No    Sexual activity: Yes     Partners: Male     Birth control/ protection: None     Other Topics Concern    Not on file     Social History Narrative         ALLERGIES: Macrodantin [nitrofurantoin macrocrystalline] and Tape [adhesive]    Review of Systems   Constitutional: Negative for chills, fatigue and fever. HENT: Negative for congestion, ear pain, facial swelling, rhinorrhea, sinus pressure, sore throat, trouble swallowing and voice change. Eyes: Negative for photophobia, pain, discharge and visual disturbance. Respiratory: Negative for cough, chest tightness, shortness of breath and wheezing. Cardiovascular: Negative for chest pain and leg swelling. Gastrointestinal: Positive for abdominal pain. Negative for blood in stool, constipation, diarrhea, nausea and vomiting. Endocrine: Negative for polyuria. Genitourinary: Negative for dysuria, flank pain, frequency, hematuria, pelvic pain, urgency and vaginal discharge. Musculoskeletal: Negative for arthralgias, back pain, gait problem, joint swelling, neck pain and neck stiffness. Skin: Negative for rash and wound. Allergic/Immunologic: Negative for immunocompromised state. Neurological: Negative for dizziness, weakness, light-headedness, numbness and headaches. Hematological: Negative for adenopathy. Psychiatric/Behavioral: Negative for agitation, confusion, hallucinations and suicidal ideas. The patient is not nervous/anxious. Vitals:    02/06/17 2315 02/06/17 2330 02/06/17 2345 02/07/17 0115   BP: 140/89 127/77 116/71 121/80   Pulse:       Resp:       Temp:       SpO2: 100% 100% 100% 99%   Weight:       Height:                Physical Exam   Constitutional: She is oriented to person, place, and time. She appears well-developed and well-nourished. Patient became tearful as soon as i entered room   HENT:   Head: Normocephalic and atraumatic.    Right Ear: External ear normal.   Left Ear: External ear normal.   Mouth/Throat: Oropharynx is clear and moist.   Eyes: Conjunctivae and EOM are normal.   Cardiovascular: Normal rate, regular rhythm and normal heart sounds. Pulmonary/Chest: Effort normal and breath sounds normal. No respiratory distress. She has no wheezes. She has no rales. Abdominal: Soft. Bowel sounds are normal. There is tenderness in the right lower quadrant and epigastric area. There is guarding. There is no rigidity, no rebound and no CVA tenderness. Musculoskeletal: Normal range of motion. Neurological: She is alert and oriented to person, place, and time. She exhibits normal muscle tone. Coordination normal.   Skin: Skin is warm and dry. No rash noted. She is not diaphoretic. No erythema. No pallor. Psychiatric: She has a normal mood and affect. Her behavior is normal. Judgment and thought content normal.   Nursing note and vitals reviewed. MDM  Number of Diagnoses or Management Options  Abdominal pain, right lower quadrant:   Elevated blood pressure:   Ileus of unspecified type [facility]):   Diagnosis management comments: DDX:  Chronic abdominal pain, abdominal adhesions, appendicitis, cystitis, gastroenteritis, mesenteric ischemia, intraabdominal abscess, hernia    MDM:  Plan - CBC, BMP, UA  Progress - CBC is WNL, BUN is elevated at 20, GFRAA is 54. UA 2-4 RBC noted, no evidence of UTI. Greater than 1000 glucose noted in urine and increased specific gravity, will order fluids. 10:36 PM Discussed case with Dr Dilma Carrasquillo, agrees with decision to CT with oral and IV contrast.   11:26 PM patient reports pain is worsening again, Morphine 2mg ordered. 1:56 AM Findings:  -minimal focal small bowel distension in the anterior lower abdomen at the site of prior surgery (3.1cm), but contrast progresses beyond this point.  No obstruction.  -contrast has not yet reached the cecum, but is in the distal ileum  -steatosis  -small hiatal hernia  -cholecystectomy  -b/l renal peripelvic cysts  -stable left adnexal cystic lesion  1:56 AM Discussed case with Dr Ivette Turner, will diagnosis with ileus, acute abdominal pain. Patients labs are unremarkable, vital signs are WNL. Patient referred to surgery, close follow-up, educated to follow clear liquid diet. Patient referred to PCP for further evaluation of elevated blood pressure. Patient educated to return to the ED for any new or worsening symptoms. Patient denies questions.         Amount and/or Complexity of Data Reviewed  Clinical lab tests: ordered and reviewed      ED Course       Procedures        RESULTS:    CT ABD PELV W CONT    (Results Pending)       Labs Reviewed   URINALYSIS W/ RFLX MICROSCOPIC - Abnormal; Notable for the following:        Result Value    Specific gravity >1.030 (*)     Glucose >1000 (*)     Ketone TRACE (*)     Blood MODERATE (*)     All other components within normal limits   METABOLIC PANEL, BASIC - Abnormal; Notable for the following:     Glucose 112 (*)     BUN 20 (*)     GFR est AA 54 (*)     GFR est non-AA 44 (*)     All other components within normal limits   URINE MICROSCOPIC ONLY   CBC WITH AUTOMATED DIFF   URINALYSIS W/ RFLX MICROSCOPIC       Recent Results (from the past 12 hour(s))   URINALYSIS W/ RFLX MICROSCOPIC    Collection Time: 02/06/17  7:44 PM   Result Value Ref Range    Color YELLOW      Appearance CLEAR      Specific gravity >1.030 (H) 1.005 - 1.030    pH (UA) 5.0 5.0 - 8.0      Protein NEGATIVE  NEG mg/dL    Glucose >1000 (A) NEG mg/dL    Ketone TRACE (A) NEG mg/dL    Bilirubin NEGATIVE  NEG      Blood MODERATE (A) NEG      Urobilinogen 0.2 0.2 - 1.0 EU/dL    Nitrites NEGATIVE  NEG      Leukocyte Esterase NEGATIVE  NEG     URINE MICROSCOPIC ONLY    Collection Time: 02/06/17  7:44 PM   Result Value Ref Range    WBC 0 to 2 0 - 4 /hpf    RBC 2 to 4 0 - 5 /hpf    Epithelial cells FEW 0 - 5 /lpf    Bacteria NEGATIVE  NEG /hpf   CBC WITH AUTOMATED DIFF    Collection Time: 02/06/17  9:36 PM   Result Value Ref Range    WBC 6.5 4.6 - 13.2 K/uL    RBC 4.65 4.20 - 5.30 M/uL    HGB 13.6 12.0 - 16.0 g/dL    HCT 41.1 35.0 - 45.0 %    MCV 88.4 74.0 - 97.0 FL    MCH 29.2 24.0 - 34.0 PG    MCHC 33.1 31.0 - 37.0 g/dL    RDW 13.1 11.6 - 14.5 %    PLATELET 445 106 - 403 K/uL    MPV 10.7 9.2 - 11.8 FL    NEUTROPHILS 45 40 - 73 %    LYMPHOCYTES 42 21 - 52 %    MONOCYTES 10 3 - 10 %    EOSINOPHILS 2 0 - 5 %    BASOPHILS 1 0 - 2 %    ABS. NEUTROPHILS 3.0 1.8 - 8.0 K/UL    ABS. LYMPHOCYTES 2.7 0.9 - 3.6 K/UL    ABS. MONOCYTES 0.6 0.05 - 1.2 K/UL    ABS. EOSINOPHILS 0.1 0.0 - 0.4 K/UL    ABS. BASOPHILS 0.0 0.0 - 0.06 K/UL    DF AUTOMATED     METABOLIC PANEL, BASIC    Collection Time: 02/06/17  9:36 PM   Result Value Ref Range    Sodium 144 136 - 145 mmol/L    Potassium 4.1 3.5 - 5.5 mmol/L    Chloride 106 100 - 108 mmol/L    CO2 32 21 - 32 mmol/L    Anion gap 6 3.0 - 18 mmol/L    Glucose 112 (H) 74 - 99 mg/dL    BUN 20 (H) 7.0 - 18 MG/DL    Creatinine 1.25 0.6 - 1.3 MG/DL    BUN/Creatinine ratio 16 12 - 20      GFR est AA 54 (L) >60 ml/min/1.73m2    GFR est non-AA 44 (L) >60 ml/min/1.73m2    Calcium 9.7 8.5 - 10.1 MG/DL       PROGRESS NOTE:   9:11 PM   Initial assessment completed. Written by Elena SOSA     One or more blood pressure readings were noted elevated during the Pt's presentation in the emergency department this date. This abnormal reading has been cited in the Pt's diagnosis, and they have been encouraged to follow up with their primary care physician, or referred to a consultant for further evaluation and treatment. Zhang Atkins NP 2:04 AM     DISCHARGE NOTE:  2:04 AM   [address]  results have been reviewed with her. She has been counseled regarding her diagnosis, treatment, and plan. She verbally conveys understanding and agreement of the signs, symptoms, diagnosis, treatment and prognosis and additionally agrees to follow up as discussed.   She also agrees with the care-plan and conveys that all of her questions have been answered. I have also provided discharge instructions for her that include: educational information regarding their diagnosis and treatment, and list of reasons why they would want to return to the ED prior to their follow-up appointment, should her condition change. CLINICAL IMPRESSION:    1. Ileus of unspecified type ([address]    2. Abdominal pain, right lower quadrant    3. Elevated blood pressure        AFTER VISIT PLAN:    Current Discharge Medication List      START taking these medications    Details   HYDROcodone-acetaminophen (NORCO) 5-325 mg per tablet Take 1 Tab by mouth every six (6) hours as needed for Pain. Max Daily Amount: 4 Tabs. Qty: 12 Tab, Refills: 0         CONTINUE these medications which have NOT CHANGED    Details   docusate sodium (COLACE) 100 mg capsule Take 1 Cap by mouth two (2) times a day for 90 days. Qty: 60 Cap, Refills: 2      ondansetron hcl (ZOFRAN, AS HYDROCHLORIDE,) 4 mg tablet Take 1 Tab by mouth every eight (8) hours as needed for Nausea. Qty: 20 Tab, Refills: 0      polyethylene glycol (MIRALAX) 17 gram/dose powder Take 17 g by mouth daily. 1 tablespoon with 8 oz of water daily  Qty: 238 g, Refills: 0      amLODIPine (NORVASC) 2.5 mg tablet Take 2.5 mg by mouth nightly. bisoprolol-hydrochlorothiazide (ZIAC) 2.5-6.25 mg per tablet Take 1 Tab by mouth daily. atorvastatin (LIPITOR) 20 mg tablet Take 40 mg by mouth daily. doxepin (SINEQUAN) 10 mg capsule Take  by mouth nightly. omeprazole (PRILOSEC) 10 mg capsule Take 10 mg by mouth daily. albuterol (PROVENTIL HFA, VENTOLIN HFA, PROAIR HFA) 90 mcg/actuation inhaler Take 1 Puff by inhalation every six (6) hours as needed for Wheezing. metFORMIN (GLUMETZA) 500 mg TG24 24 hour tablet Take 500 mg by mouth two (2) times a day.               Follow-up Information     Follow up With Details Comments Contact Info    ALHAJI Carrera MD Schedule an appointment as soon as possible for a visit in 1 week Further evaluation - elevated blood pressure [address] [id number]  Shannan Hurtado MD Schedule an appointment as soon as possible for a visit in 1 day Further evaluation [id number] [address] [address] [phone]             Written by Pavan ANDERSONC

## 2017-02-07 NOTE — DISCHARGE INSTRUCTIONS
Abdominal Pain: Care Instructions  Your Care Instructions    Abdominal pain has many possible causes. Some aren't serious and get better on their own in a few days. Others need more testing and treatment. If your pain continues or gets worse, you need to be rechecked and may need more tests to find out what is wrong. You may need surgery to correct the problem. Don't ignore new symptoms, such as fever, nausea and vomiting, urination problems, pain that gets worse, and dizziness. These may be signs of a more serious problem. Your doctor may have recommended a follow-up visit in the next 8 to 12 hours. If you are not getting better, you may need more tests or treatment. The doctor has checked you carefully, but problems can develop later. If you notice any problems or new symptoms, get medical treatment right away. Follow-up care is a key part of your treatment and safety. Be sure to make and go to all appointments, and call your doctor if you are having problems. It's also a good idea to know your test results and keep a list of the medicines you take. How can you care for yourself at home? · Rest until you feel better. · To prevent dehydration, drink plenty of fluids, enough so that your urine is light yellow or clear like water. Choose water and other caffeine-free clear liquids until you feel better. If you have kidney, heart, or liver disease and have to limit fluids, talk with your doctor before you increase the amount of fluids you drink. · If your stomach is upset, eat mild foods, such as rice, dry toast or crackers, bananas, and applesauce. Try eating several small meals instead of two or three large ones. · Wait until 48 hours after all symptoms have gone away before you have spicy foods, alcohol, and drinks that contain caffeine. · Do not eat foods that are high in fat. · Avoid anti-inflammatory medicines such as aspirin, ibuprofen (Advil, Motrin), and naproxen (Aleve).  These can cause stomach upset. Talk to your doctor if you take daily aspirin for another health problem. When should you call for help? Call 911 anytime you think you may need emergency care. For example, call if:  · You passed out (lost consciousness). · You pass maroon or very bloody stools. · You vomit blood or what looks like coffee grounds. · You have new, severe belly pain. Call your doctor now or seek immediate medical care if:  · Your pain gets worse, especially if it becomes focused in one area of your belly. · You have a new or higher fever. · Your stools are black and look like tar, or they have streaks of blood. · You have unexpected vaginal bleeding. · You have symptoms of a urinary tract infection. These may include:  ¨ Pain when you urinate. ¨ Urinating more often than usual.  ¨ Blood in your urine. · You are dizzy or lightheaded, or you feel like you may faint. Watch closely for changes in your health, and be sure to contact your doctor if:  · You are not getting better after 1 day (24 hours). Where can you learn more? Go to http://betzyYAMAPdidi.info/. Enter M205 in the search box to learn more about \"Abdominal Pain: Care Instructions. \"  Current as of: May 27, 2016  Content Version: 11.1  © 9268-9160 [employer]. Care instructions adapted under license by [employer] (which disclaims liability or warranty for this information). If you have questions about a medical condition or this instruction, always ask your healthcare professional. [place] [address] any warranty or liability for your use of this information. Bowel Blockage (Intestinal Obstruction): Care Instructions  Your Care Instructions  A bowel blockage, also called an intestinal obstruction, can prevent gas, fluids, or solids from moving through the intestines normally. It can cause constipation and, rarely, diarrhea. You may have pain, nausea, vomiting, and cramping.   Most of the time, complete blockages require a stay in the hospital and possibly surgery. But if your bowel is only partly blocked, your doctor may tell you to wait until it clears on its own and you are able to pass gas and stool. If so, there are things you can do at home to help make you feel better. If you have had surgery for a bowel blockage, there are things you can do at home to make sure you heal well. You can also make some changes to keep your bowel from becoming blocked again. Follow-up care is a key part of your treatment and safety. Be sure to make and go to all appointments, and call your doctor if you are having problems. It's also a good idea to know your test results and keep a list of the medicines you take. How can you care for yourself at home? If your doctor has told you to wait at home for a blockage to clear on its own:  · Follow your doctor's instructions. These may include eating a liquid diet to avoid complete blockage. · Take your medicines exactly as prescribed. Call your doctor if you think you are having a problem with your medicine. · Put a heating pad set on low on your belly to relieve mild cramps and pain. To prevent another blockage  · Try to eat smaller amounts of food more often. For example, have 5 or 6 small meals throughout the day instead of 2 or 3 large meals. · Chew your food very well. Try to chew each bite about 20 times or until it is liquid. · Avoid high-fiber foods and raw fruits and vegetables with skins, husks, strings, or seeds. These can form a ball of undigested material that can cause a blockage if a part of your bowel is scarred or narrowed. · Check with your doctor before you eat whole-grain products or use a fiber supplement such as Citrucel or Metamucil. · To help you have regular bowel movements, eat at regular times, do not strain during a bowel movement, and drink at least 8 to 10 glasses of water each day.  If you have kidney, heart, or liver disease and have to limit fluids, talk with your doctor or before you increase the amount of fluids you drink. · Drink high-calorie liquid formulas if your doctor says to. Severe symptoms may make it hard for your body to take in vitamins and minerals. · Get regular exercise. It helps you digest your food better. Get at least 30 minutes of physical activity on most days of the week. Walking is a good choice. When should you call for help? Call 911 anytime you think you may need emergency care. For example, call if:  · You passed out (lost consciousness). · You have severe pain or swelling in your belly. · You vomit blood or what looks like coffee grounds. · You pass maroon or very bloody stools. · You cannot pass any stools or gas. Call your doctor now or seek immediate medical care if:  · You have a new or higher fever. · You cannot keep fluids or medicines down. · You have new pain that gets worse when you move or cough. · Your symptoms become much worse than usual.  Watch closely for changes in your health, and be sure to contact your doctor if:  · You do not get better as expected. · You have steady diarrhea for more than 2 weeks. · You've been losing weight. Where can you learn more? Go to http://betzy-didi.info/. Enter L327 in the search box to learn more about \"Bowel Blockage (Intestinal Obstruction): Care Instructions. \"  Current as of: August 9, 2016  Content Version: 11.1  © 8988-8205 [employer]. Care instructions adapted under license by [employer] (which disclaims liability or warranty for this information). If you have questions about a medical condition or this instruction, always ask your healthcare professional. [address] any warranty or liability for your use of this information. [employer]    Thank you for requesting access to [employer].  Please follow the instructions below to securely access and download your online medical record. [facility] allows you to send messages to your doctor, view your test results, renew your prescriptions, schedule appointments, and more. How Do I Sign Up? 1. In your internet browser, go to [email or web address]  2. Click on the First Time User? Click Here link in the Sign In box. You will be redirect to the New Member Sign Up page. 3. Enter your [facility] Access Code exactly as it appears below. You will not need to use this code after youve completed the sign-up process. If you do not sign up before the expiration date, you must request a new code. [facility] Access Code: [email or web address]  Expires: 2017  1:41 PM (This is the date your [facility] access code will )    4. Enter the last four digits of your Social Security Number (xxxx) and Date of Birth (mm/dd/yyyy) as indicated and click Submit. You will be taken to the next sign-up page. 5. Create a [facility] ID. This will be your [facility] login ID and cannot be changed, so think of one that is secure and easy to remember. 6. Create a [facility] password. You can change your password at any time. 7. Enter your Password Reset Question and Answer. This can be used at a later time if you forget your password. 8. Enter your e-mail address. You will receive e-mail notification when new information is available in [address] 9. Click Sign Up. You can now view and download portions of your medical record. 10. Click the Download Summary menu link to download a portable copy of your medical information. Additional Information    If you have questions, please visit the Frequently Asked Questions section of the [facility] website at [email or web address] Remember, [facility] is NOT to be used for urgent needs. For medical emergencies, dial 911.

## 2017-02-08 ENCOUNTER — OFFICE VISIT (OUTPATIENT)
Dept: SURGERY | Age: 57
End: 2017-02-08

## 2017-02-08 VITALS
RESPIRATION RATE: 16 BRPM | HEART RATE: 78 BPM | BODY MASS INDEX: 26.16 KG/M2 | TEMPERATURE: 98.1 F | HEIGHT: 65 IN | WEIGHT: 157 LBS | OXYGEN SATURATION: 99 % | SYSTOLIC BLOOD PRESSURE: 143 MMHG | DIASTOLIC BLOOD PRESSURE: 97 MMHG

## 2017-02-08 DIAGNOSIS — R10.31 RIGHT LOWER QUADRANT ABDOMINAL PAIN: Primary | ICD-10-CM

## 2017-02-08 NOTE — PROGRESS NOTES
Imaging results abnormal.  Images were reviewed and pathology managed appropriately during ED visits. No further interventions necessary.       Kiko Fitzpatrick PA-C

## 2017-02-08 NOTE — PROGRESS NOTES
Patient is a 62 y.o. female who presents for an evaluation of constant RLQ abdominal pain which radiates to her back. She scores her pain as a 7/10.

## 2017-02-08 NOTE — PATIENT INSTRUCTIONS
If you have any questions or concerns about today's appointment, the verbal and/or written instructions you were given for follow up care, please call our office at [phone]. [facility] [address]    [phone] office  [phone]      . Hannah Nance PATIENT PRE AND POST OPERATIVE INSTRUCTIONS     [address] [place] [address]     Before Surgery Instructions:   1) You must have someone available to drive you to and from your procedure and stay with you for the first 24 hours. 2) It is very important that you have nothing to eat or drink after midnight the night before your surgery. This includes chewing gum or sucking on hard candy. Take only heart, blood pressure and cholesterol medications the morning of surgery with only a sip of water. 3) Please stop taking Plavix 10 days prior to your surgery. Stop taking Coumadin 5 days prior to your surgery. Stop taking all Aspirin or Aspirin containing products 7 days prior to your surgery. Stop taking Advil, Motrin, Aleve, and etc. 3 days prior to your surgery. 4) If you take any diabetic medications please consult with your primary care physician on how to take them on the day of your surgery  5) Please stop all Herbal products 2 weeks prior to your surgery. 6) Please arrive at the hospital 1 ½ hours prior to your surgery, unless you have been otherwise instructed. 7) Patients having an operation on their colon will be given a separate instruction sheet on their Bowel Prep. 8) For any pre-operative work up check in at the main entrance to [address] [place] [address], and then go to Patient Registration. These studies are done on a walk in basis they are open from 7:00am to 5:00pm Monday through Friday. 9) Please wash your surgical site the morning of your surgery with soap and water.   10) If you are of child bearing age you will have pregnancy test done the morning of your surgery as soon as you arrive. After Surgery Instructions: You will need to be seen in the office for a follow-up visit 7-14 days after your surgery. Please call after you have had the procedure to make this appointment. Unless otherwise instructed, you may remove your outer bandage and shower 48 hours after your surgery. If you develop a fever greater than 101, have any significant drainage, bleeding, swelling and/or pus of the wound. Please call our office immediately. Surgery Date and Time: Tuesday, February 28, 2017 at 8:45am     Please check in at [facility], enter through the Emergency Room entrance and go up to the second floor. Please check in by 7:15am the day of your surgery. You may contact Padmaja Perez with any questions at [phone].

## 2017-02-08 NOTE — MR AVS SNAPSHOT
Visit Information Date & Time Provider Department Dept. Phone Encounter #  
 2/8/2017  2:30 PM Felisha Roy MD [facility] [phone] [id number] Your Appointments 3/13/2017  9:00 AM  
POST OP with Felisha Roy MD  
[address]) Appt Note: 2 week follow up  
 [address] [facility] [address]  
  
   
 [address] [facility] [address] [facility] 95 Upcoming Health Maintenance Date Due Hepatitis C Screening 1960 LIPID PANEL Q1 1960 FOOT EXAM Q1 2/4/1970 MICROALBUMIN Q1 2/4/1970 EYE EXAM RETINAL OR DILATED Q1 2/4/1970 Pneumococcal 19-64 Medium Risk (1 of 1 - PPSV23) 2/4/1979 DTaP/Tdap/Td series (1 - Tdap) 2/4/1981 PAP AKA CERVICAL CYTOLOGY 2/4/1981 FOBT Q 1 YEAR AGE 50-75 2/4/2010 INFLUENZA AGE 9 TO ADULT 8/1/2016 HEMOGLOBIN A1C Q6M 12/27/2016 BREAST CANCER SCRN MAMMOGRAM 1/6/2019 Allergies as of 2/8/2017  Review Complete On: 2/8/2017 By: Sayra Lopez Severity Noted Reaction Type Reactions Macrodantin [Nitrofurantoin Macrocrystalline]  01/31/2012    Itching Tape [Adhesive]  09/30/2014    Other (comments) Burned skin when had wound vac Current Immunizations  Never Reviewed No immunizations on file. Not reviewed this visit Vitals BP Pulse Temp Resp Height(growth percentile) Weight(growth percentile) (!) 143/97 (BP 1 Location: Right arm, BP Patient Position: Sitting) 78 98.1 °F (36.7 °C) (Oral) 16 5' 5\" (1.651 m) 157 lb (71.2 kg) SpO2 BMI OB Status Smoking Status 99% 26.13 kg/m2 Hysterectomy Never Smoker BMI and BSA Data Body Mass Index Body Surface Area  
 26.13 kg/m 2 1.81 m 2 Preferred Pharmacy Pharmacy Name Phone [facility]  [facility] [phone] Your Updated Medication List  
  
   
This list is accurate as of: 2/8/17  3:00 PM.  Always use your most recent med list.  
  
  
  
  
 albuterol 90 mcg/actuation inhaler Commonly known as:  PROVENTIL HFA, VENTOLIN HFA, PROAIR HFA Take 1 Puff by inhalation every six (6) hours as needed for Wheezing. amLODIPine 2.5 mg tablet Commonly known as:  [place] Take 2.5 mg by mouth nightly. docusate sodium 100 mg capsule Commonly known as:  Chelo Neighbor Take 1 Cap by mouth two (2) times a day for 90 days. doxepin 10 mg capsule Commonly known as:  SINEquan Take  by mouth nightly. GLUMETZA 500 mg Tg24 24 hour tablet Generic drug:  metFORMIN Take 500 mg by mouth two (2) times a day. HYDROcodone-acetaminophen 5-325 mg per tablet Commonly known as:  Benetta Pock Take 1 Tab by mouth every six (6) hours as needed for Pain. Max Daily Amount: 4 Tabs. LIPITOR 20 mg tablet Generic drug:  atorvastatin Take 40 mg by mouth daily. omeprazole 10 mg capsule Commonly known as:  PRILOSEC Take 10 mg by mouth daily. ondansetron hcl 4 mg tablet Commonly known as:  ZOFRAN (AS HYDROCHLORIDE) Take 1 Tab by mouth every eight (8) hours as needed for Nausea. polyethylene glycol 17 gram/dose powder Commonly known as:  Freya Clause Take 17 g by mouth daily. 1 tablespoon with 8 oz of water daily ZIAC 2.5-6.25 mg per tablet Generic drug:  bisoprolol-hydroCHLOROthiazide Take 1 Tab by mouth daily. Patient Instructions If you have any questions or concerns about today's appointment, the verbal and/or written instructions you were given for follow up care, please call our office at [phone]. [employer] [facility] - [facility] [id number] [place] [address], [address] 
 
[phone] office [phone] Eryn Po Eryn Po PATIENT PRE AND POST OPERATIVE INSTRUCTIONS [address] Before Surgery Instructions: 1) You must have someone available to drive you to and from your procedure and stay with you for the first 24 hours. 2) It is very important that you have nothing to eat or drink after midnight the night before your surgery. This includes chewing gum or sucking on hard candy. Take only heart, blood pressure and cholesterol medications the morning of surgery with only a sip of water. 3) Please stop taking Plavix 10 days prior to your surgery. Stop taking Coumadin 5 days prior to your surgery. Stop taking all Aspirin or Aspirin containing products 7 days prior to your surgery. Stop taking Advil, Motrin, Aleve, and etc. 3 days prior to your surgery. 4) If you take any diabetic medications please consult with your primary care physician on how to take them on the day of your surgery 5) Please stop all Herbal products 2 weeks prior to your surgery. 6) Please arrive at the hospital 1 ½ hours prior to your surgery, unless you have been otherwise instructed. 7) Patients having an operation on their colon will be given a separate instruction sheet on their Bowel Prep. 8) For any pre-operative work up check in at the main entrance to [facility], and then go to Patient Registration. These studies are done on a walk in basis they are open from 7:00am to 5:00pm Monday through Friday. 9) Please wash your surgical site the morning of your surgery with soap and water. 10) If you are of child bearing age you will have pregnancy test done the morning of your surgery as soon as you arrive. After Surgery Instructions: You will need to be seen in the office for a follow-up visit 7-14 days after your surgery. Please call after you have had the procedure to make this appointment. Unless otherwise instructed, you may remove your outer bandage and shower 48 hours after your surgery.  
If you develop a fever greater than 101, have any significant drainage, bleeding, swelling and/or pus of the wound. Please call our office immediately. Surgery Date and Time: Tuesday, February 28, 2017 at 8:45am  
 
Please check in at [facility], enter through the Emergency Room entrance and go up to the second floor. Please check in by 7:15am the day of your surgery. You may contact Mariah Healy with any questions at [phone]. Introducing \[facility]\"" & HEALTH SERVICES! Frannie Gonzales introduces [employer] patient portal. Now you can access parts of your medical record, email your doctor's office, and request medication refills online. 1. In your internet browser, go to [email or web address] 2. Click on the First Time User? Click Here link in the Sign In box. You will see the New Member Sign Up page. 3. Enter your [employer] Access Code exactly as it appears below. You will not need to use this code after youve completed the sign-up process. If you do not sign up before the expiration date, you must request a new code. · [employer] Access Code: [email or web address] Expires: 4/22/2017  1:41 PM 
 
4. Enter the last four digits of your Social Security Number (xxxx) and Date of Birth (mm/dd/yyyy) as indicated and click Submit. You will be taken to the next sign-up page. 5. Create a [employer] ID. This will be your [employer] login ID and cannot be changed, so think of one that is secure and easy to remember. 6. Create a [employer] password. You can change your password at any time. 7. Enter your Password Reset Question and Answer. This can be used at a later time if you forget your password. 8. Enter your e-mail address. You will receive e-mail notification when new information is available in [address] 9. Click Sign Up. You can now view and download portions of your medical record. 10. Click the Download Summary menu link to download a portable copy of your medical information.  
 
If you have questions, please visit the Frequently Asked Questions section of the [facility]. Remember, [employer] is NOT to be used for urgent needs. For medical emergencies, dial 911. Now available from your iPhone and Android! Please provide this summary of care documentation to your next provider. Your primary care clinician is listed as ALHAJI Cain. If you have any questions after today's visit, please call [phone].

## 2017-02-08 NOTE — PROGRESS NOTES
General Surgery Consult    Leela Hernandez  Admit date: (Not on file)    MRN: [id number]     : 1960     Age: 62 y.o. Attending Physician: Trung Rolon MD, [facility]      History of Present Illness:      Leela Hernandez is a 62 y.o. female who is a well-known to me. She has a history of recurrent abdominal pain mainly on the right lower quadrants. The pain has been there for years but it has been getting worse over the last few months. She had multiple ER visits for pain. Her last CT scan show dilatation at the previous small bowel resection site just proximal to the staler line. I did laparoscopic exploration for recurrent abdominal pain about one year ago, and during that surgery I had to abort because the patient has severe adhesions and I was concerned about causing bowel injury. The patient said that she is very tired of having to go to the emergency room and having the right lower quadrant pain.      Patient Active Problem List    Diagnosis Date Noted    Osteoarthritis of left knee 2016    Hypertension 2016    Hyperlipidemia 2016    GERD (gastroesophageal reflux disease) 2016    Asthma 2016    Type 2 diabetes mellitus ([address] 2016     Past Medical History   Diagnosis Date    Abdominal adhesions     Anemia     Asthma     Asthma     Diabetes ([address]     Diabetes mellitus     Dyskinesia      bilateral    Essential hypertension     Hypertension     Menopause     Stool color black       Past Surgical History   Procedure Laterality Date    Tissue localization-excision      Hx hernia repair      Hx cholecystectomy  6/28/10    Hx oophorectomy      Hx hysterectomy       Fibroids    Hx knee replacement        Social History   Substance Use Topics    Smoking status: Never Smoker    Smokeless tobacco: Never Used    Alcohol use No      History   Smoking Status    Never Smoker   Smokeless Tobacco    Never Used     Family History Problem Relation Age of Onset    Hypertension Other      parent    Breast Cancer Other 21    Heart Disease Father     Hypertension Father     Hypertension Other      sibling    Diabetes Other      parent      Current Outpatient Prescriptions   Medication Sig    docusate sodium (COLACE) 100 mg capsule Take 1 Cap by mouth two (2) times a day for 90 days.  amLODIPine (NORVASC) 2.5 mg tablet Take 2.5 mg by mouth nightly.  bisoprolol-hydrochlorothiazide (ZIAC) 2.5-6.25 mg per tablet Take 1 Tab by mouth daily.  atorvastatin (LIPITOR) 20 mg tablet Take 40 mg by mouth daily.  doxepin (SINEQUAN) 10 mg capsule Take  by mouth nightly.  omeprazole (PRILOSEC) 10 mg capsule Take 10 mg by mouth daily.  albuterol (PROVENTIL HFA, VENTOLIN HFA, PROAIR HFA) 90 mcg/actuation inhaler Take 1 Puff by inhalation every six (6) hours as needed for Wheezing.  metFORMIN (GLUMETZA) 500 mg TG24 24 hour tablet Take 500 mg by mouth two (2) times a day.  HYDROcodone-acetaminophen (NORCO) 5-325 mg per tablet Take 1 Tab by mouth every six (6) hours as needed for Pain. Max Daily Amount: 4 Tabs.  ondansetron hcl (ZOFRAN, AS HYDROCHLORIDE,) 4 mg tablet Take 1 Tab by mouth every eight (8) hours as needed for Nausea.  polyethylene glycol (MIRALAX) 17 gram/dose powder Take 17 g by mouth daily. 1 tablespoon with 8 oz of water daily     No current facility-administered medications for this visit. Allergies   Allergen Reactions    Macrodantin [Nitrofurantoin Macrocrystalline] Itching    Tape [Adhesive] Other (comments)     Burned skin when had wound vac        Review of Systems:  Pertinent items are noted in the History of Present Illness.     Objective:     Visit Vitals    BP (!) 143/97 (BP 1 Location: Right arm, BP Patient Position: Sitting)    Pulse 78    Temp 98.1 °F (36.7 °C) (Oral)    Resp 16    Ht 5' 5\" (1.651 m)    Wt 71.2 kg (157 lb)    SpO2 99%    BMI 26.13 kg/m2       Physical Exam: General:  in no apparent distress and well developed and well nourished   Eyes:  conjunctivae and sclerae normal, pupils equal, round, reactive to light   Throat & Neck: no erythema or exudates noted and neck supple and symmetrical; no palpable masses   Lungs:   clear to auscultation bilaterally   Heart:  Regular rate and rhythm   Abdomen:   rounded, soft, nondistended, no masses or organomegaly. Right lower quadrant tenderness was localized guarding. No rebound. Extremities: extremities normal, atraumatic, no cyanosis or edema   Skin: Normal.       Imaging and Lab Review:     CBC:   Lab Results   Component Value Date/Time    WBC 6.5 02/06/2017 09:36 PM    RBC 4.65 02/06/2017 09:36 PM    HGB 13.6 02/06/2017 09:36 PM    HCT 41.1 02/06/2017 09:36 PM    PLATELET 275 68/05/0608 09:36 PM     BMP:   Lab Results   Component Value Date/Time    Glucose 112 02/06/2017 09:36 PM    Sodium 144 02/06/2017 09:36 PM    Potassium 4.1 02/06/2017 09:36 PM    Chloride 106 02/06/2017 09:36 PM    CO2 32 02/06/2017 09:36 PM    BUN 20 02/06/2017 09:36 PM    Creatinine 1.25 02/06/2017 09:36 PM    Calcium 9.7 02/06/2017 09:36 PM     CMP:  Lab Results   Component Value Date/Time    Glucose 112 02/06/2017 09:36 PM    Sodium 144 02/06/2017 09:36 PM    Potassium 4.1 02/06/2017 09:36 PM    Chloride 106 02/06/2017 09:36 PM    CO2 32 02/06/2017 09:36 PM    BUN 20 02/06/2017 09:36 PM    Creatinine 1.25 02/06/2017 09:36 PM    Calcium 9.7 02/06/2017 09:36 PM    Anion gap 6 02/06/2017 09:36 PM    BUN/Creatinine ratio 16 02/06/2017 09:36 PM    Alk. phosphatase 150 01/22/2017 02:00 PM    Protein, total 7.3 01/22/2017 02:00 PM    Albumin 3.7 01/22/2017 02:00 PM    Globulin 3.6 01/22/2017 02:00 PM    A-G Ratio 1.0 01/22/2017 02:00 PM       No results found for this or any previous visit (from the past 24 hour(s)).     images and reports reviewed    Assessment:   Olaf Mascorro is a 62 y.o. female is presenting with abdominal pain and a picture of bowel obstruction at the site of her previous small bowel resection. I explained to the patient that she has severe adhesions from her previous small bowel perforation and though she will need surgery she is at very high risk of bowel injury and the patient understands the risks and she said she would like to proceed with the surgery as soon as possible because of the recurrent pain that he's been having. Plan:     Schedule for exploratory laparotomy and small bowel resection.      Please call me if you have any questions (cell phone: [phone])     Signed By: Eliza Bailey MD     February 8, 2017

## 2017-02-16 NOTE — PERIOP NOTES
Ms. Agnieszka Ribeiro was not given any bowel prep instructions for her upcoming colon resection. Instructed her to call Dr. Shawna Alba .

## 2017-02-20 ENCOUNTER — ANESTHESIA EVENT (OUTPATIENT)
Dept: SURGERY | Age: 57
DRG: 331 | End: 2017-02-20
Payer: COMMERCIAL

## 2017-02-21 ENCOUNTER — SURGERY (OUTPATIENT)
Age: 57
End: 2017-02-21

## 2017-02-21 ENCOUNTER — ANESTHESIA (OUTPATIENT)
Dept: SURGERY | Age: 57
DRG: 331 | End: 2017-02-21
Payer: COMMERCIAL

## 2017-02-21 ENCOUNTER — HOSPITAL ENCOUNTER (INPATIENT)
Age: 57
LOS: 7 days | Discharge: HOME | DRG: 331 | End: 2017-02-28
Attending: SURGERY | Admitting: SURGERY
Payer: COMMERCIAL

## 2017-02-21 PROBLEM — K56.609 SBO (SMALL BOWEL OBSTRUCTION) (HCC): Status: ACTIVE | Noted: 2017-02-21

## 2017-02-21 LAB
BUN BLD-MCNC: 17 MG/DL (ref 7–18)
CHLORIDE BLD-SCNC: 102 MMOL/L (ref 100–108)
GLUCOSE BLD STRIP.AUTO-MCNC: 110 MG/DL (ref 70–110)
GLUCOSE BLD STRIP.AUTO-MCNC: 111 MG/DL (ref 74–106)
HCT VFR BLD CALC: 43 % (ref 36–49)
HGB BLD-MCNC: 14.6 G/DL (ref 12–16)
POTASSIUM BLD-SCNC: 3.9 MMOL/L (ref 3.5–5.5)
SODIUM BLD-SCNC: 142 MMOL/L (ref 136–145)

## 2017-02-21 PROCEDURE — 74011250637 HC RX REV CODE- 250/637: Performed by: SURGERY

## 2017-02-21 PROCEDURE — 77030003029 HC SUT VCRL J&J -B: Performed by: SURGERY

## 2017-02-21 PROCEDURE — 82962 GLUCOSE BLOOD TEST: CPT

## 2017-02-21 PROCEDURE — 77030008477 HC STYL SATN SLP COVD -A: Performed by: NURSE ANESTHETIST, CERTIFIED REGISTERED

## 2017-02-21 PROCEDURE — 77030032490 HC SLV COMPR SCD KNE COVD -B: Performed by: SURGERY

## 2017-02-21 PROCEDURE — 74011250636 HC RX REV CODE- 250/636: Performed by: SURGERY

## 2017-02-21 PROCEDURE — 77030018842 HC SOL IRR SOD CL 9% BAXT -A: Performed by: SURGERY

## 2017-02-21 PROCEDURE — 74011250636 HC RX REV CODE- 250/636: Performed by: NURSE ANESTHETIST, CERTIFIED REGISTERED

## 2017-02-21 PROCEDURE — 74011250636 HC RX REV CODE- 250/636

## 2017-02-21 PROCEDURE — 77030011265 HC ELECTRD BLD HEX COVD -A: Performed by: SURGERY

## 2017-02-21 PROCEDURE — 74011000250 HC RX REV CODE- 250

## 2017-02-21 PROCEDURE — 77030031139 HC SUT VCRL2 J&J -A: Performed by: SURGERY

## 2017-02-21 PROCEDURE — 77030018521 HC STPLR ENDOSCOPIC J&J -C: Performed by: SURGERY

## 2017-02-21 PROCEDURE — 77030011278 HC ELECTRD LIG IMPT COVD -F: Performed by: SURGERY

## 2017-02-21 PROCEDURE — 77030008771 HC TU NG SALEM SUMP -A: Performed by: NURSE ANESTHETIST, CERTIFIED REGISTERED

## 2017-02-21 PROCEDURE — 74011250637 HC RX REV CODE- 250/637: Performed by: NURSE ANESTHETIST, CERTIFIED REGISTERED

## 2017-02-21 PROCEDURE — 76010000149 HC OR TIME 1 TO 1.5 HR: Performed by: SURGERY

## 2017-02-21 PROCEDURE — 65270000029 HC RM PRIVATE

## 2017-02-21 PROCEDURE — 88307 TISSUE EXAM BY PATHOLOGIST: CPT | Performed by: SURGERY

## 2017-02-21 PROCEDURE — 77030013079 HC BLNKT BAIR HGGR 3M -A: Performed by: ANESTHESIOLOGY

## 2017-02-21 PROCEDURE — 76210000006 HC OR PH I REC 0.5 TO 1 HR: Performed by: SURGERY

## 2017-02-21 PROCEDURE — 82947 ASSAY GLUCOSE BLOOD QUANT: CPT

## 2017-02-21 PROCEDURE — 76060000033 HC ANESTHESIA 1 TO 1.5 HR: Performed by: SURGERY

## 2017-02-21 PROCEDURE — 0DB80ZZ EXCISION OF SMALL INTESTINE, OPEN APPROACH: ICD-10-PCS | Performed by: SURGERY

## 2017-02-21 PROCEDURE — 77030019908 HC STETH ESOPH SIMS -A: Performed by: ANESTHESIOLOGY

## 2017-02-21 PROCEDURE — 77030008462 HC STPLR SKN PROX J&J -A: Performed by: SURGERY

## 2017-02-21 PROCEDURE — 0DNV0ZZ RELEASE MESENTERY, OPEN APPROACH: ICD-10-PCS | Performed by: SURGERY

## 2017-02-21 PROCEDURE — 77030009978 HC RELD STPLR TCR J&J -B: Performed by: SURGERY

## 2017-02-21 PROCEDURE — 0DN80ZZ RELEASE SMALL INTESTINE, OPEN APPROACH: ICD-10-PCS | Performed by: SURGERY

## 2017-02-21 PROCEDURE — 77030008683 HC TU ET CUF COVD -A: Performed by: NURSE ANESTHETIST, CERTIFIED REGISTERED

## 2017-02-21 PROCEDURE — 77030002966 HC SUT PDS J&J -A: Performed by: SURGERY

## 2017-02-21 PROCEDURE — 77030020255 HC SOL INJ LR 1000ML BG

## 2017-02-21 PROCEDURE — 77030011267 HC ELECTRD BLD COVD -A: Performed by: SURGERY

## 2017-02-21 RX ORDER — FAMOTIDINE 20 MG/1
20 TABLET, FILM COATED ORAL ONCE
Status: COMPLETED | OUTPATIENT
Start: 2017-02-21 | End: 2017-02-21

## 2017-02-21 RX ORDER — ATORVASTATIN CALCIUM 40 MG/1
40 TABLET, FILM COATED ORAL DAILY
Status: DISCONTINUED | OUTPATIENT
Start: 2017-02-22 | End: 2017-02-28 | Stop reason: HOSPADM

## 2017-02-21 RX ORDER — DEXAMETHASONE SODIUM PHOSPHATE 4 MG/ML
INJECTION, SOLUTION INTRA-ARTICULAR; INTRALESIONAL; INTRAMUSCULAR; INTRAVENOUS; SOFT TISSUE AS NEEDED
Status: DISCONTINUED | OUTPATIENT
Start: 2017-02-21 | End: 2017-02-21 | Stop reason: HOSPADM

## 2017-02-21 RX ORDER — NEOSTIGMINE METHYLSULFATE 5 MG/5 ML
SYRINGE (ML) INTRAVENOUS AS NEEDED
Status: DISCONTINUED | OUTPATIENT
Start: 2017-02-21 | End: 2017-02-21 | Stop reason: HOSPADM

## 2017-02-21 RX ORDER — SODIUM CHLORIDE, SODIUM LACTATE, POTASSIUM CHLORIDE, CALCIUM CHLORIDE 600; 310; 30; 20 MG/100ML; MG/100ML; MG/100ML; MG/100ML
75 INJECTION, SOLUTION INTRAVENOUS CONTINUOUS
Status: DISCONTINUED | OUTPATIENT
Start: 2017-02-21 | End: 2017-02-21 | Stop reason: HOSPADM

## 2017-02-21 RX ORDER — ONDANSETRON 2 MG/ML
8 INJECTION INTRAMUSCULAR; INTRAVENOUS EVERY 6 HOURS
Status: DISCONTINUED | OUTPATIENT
Start: 2017-02-21 | End: 2017-02-23

## 2017-02-21 RX ORDER — LIDOCAINE HYDROCHLORIDE 20 MG/ML
INJECTION, SOLUTION EPIDURAL; INFILTRATION; INTRACAUDAL; PERINEURAL AS NEEDED
Status: DISCONTINUED | OUTPATIENT
Start: 2017-02-21 | End: 2017-02-21 | Stop reason: HOSPADM

## 2017-02-21 RX ORDER — SODIUM CHLORIDE, SODIUM LACTATE, POTASSIUM CHLORIDE, CALCIUM CHLORIDE 600; 310; 30; 20 MG/100ML; MG/100ML; MG/100ML; MG/100ML
50 INJECTION, SOLUTION INTRAVENOUS CONTINUOUS
Status: DISCONTINUED | OUTPATIENT
Start: 2017-02-21 | End: 2017-02-27

## 2017-02-21 RX ORDER — CEFAZOLIN SODIUM 2 G/50ML
2 SOLUTION INTRAVENOUS
Status: COMPLETED | OUTPATIENT
Start: 2017-02-21 | End: 2017-02-21

## 2017-02-21 RX ORDER — ONDANSETRON 2 MG/ML
INJECTION INTRAMUSCULAR; INTRAVENOUS AS NEEDED
Status: DISCONTINUED | OUTPATIENT
Start: 2017-02-21 | End: 2017-02-21 | Stop reason: HOSPADM

## 2017-02-21 RX ORDER — HYDROMORPHONE HYDROCHLORIDE 1 MG/ML
INJECTION, SOLUTION INTRAMUSCULAR; INTRAVENOUS; SUBCUTANEOUS AS NEEDED
Status: DISCONTINUED | OUTPATIENT
Start: 2017-02-21 | End: 2017-02-21 | Stop reason: HOSPADM

## 2017-02-21 RX ORDER — ESMOLOL HYDROCHLORIDE 10 MG/ML
INJECTION INTRAVENOUS AS NEEDED
Status: DISCONTINUED | OUTPATIENT
Start: 2017-02-21 | End: 2017-02-21 | Stop reason: HOSPADM

## 2017-02-21 RX ORDER — ALBUTEROL SULFATE 0.83 MG/ML
2.5 SOLUTION RESPIRATORY (INHALATION)
Status: DISCONTINUED | OUTPATIENT
Start: 2017-02-21 | End: 2017-02-28 | Stop reason: HOSPADM

## 2017-02-21 RX ORDER — HYDROMORPHONE HYDROCHLORIDE 1 MG/ML
1 INJECTION, SOLUTION INTRAMUSCULAR; INTRAVENOUS; SUBCUTANEOUS
Status: DISCONTINUED | OUTPATIENT
Start: 2017-02-21 | End: 2017-02-23

## 2017-02-21 RX ORDER — PROPOFOL 10 MG/ML
INJECTION, EMULSION INTRAVENOUS AS NEEDED
Status: DISCONTINUED | OUTPATIENT
Start: 2017-02-21 | End: 2017-02-21 | Stop reason: HOSPADM

## 2017-02-21 RX ORDER — SODIUM CHLORIDE, SODIUM LACTATE, POTASSIUM CHLORIDE, CALCIUM CHLORIDE 600; 310; 30; 20 MG/100ML; MG/100ML; MG/100ML; MG/100ML
25 INJECTION, SOLUTION INTRAVENOUS CONTINUOUS
Status: DISCONTINUED | OUTPATIENT
Start: 2017-02-21 | End: 2017-02-21 | Stop reason: HOSPADM

## 2017-02-21 RX ORDER — FENTANYL CITRATE 50 UG/ML
INJECTION, SOLUTION INTRAMUSCULAR; INTRAVENOUS AS NEEDED
Status: DISCONTINUED | OUTPATIENT
Start: 2017-02-21 | End: 2017-02-21 | Stop reason: HOSPADM

## 2017-02-21 RX ORDER — SUCCINYLCHOLINE CHLORIDE 20 MG/ML
INJECTION INTRAMUSCULAR; INTRAVENOUS AS NEEDED
Status: DISCONTINUED | OUTPATIENT
Start: 2017-02-21 | End: 2017-02-21 | Stop reason: HOSPADM

## 2017-02-21 RX ORDER — GLYCOPYRROLATE 0.2 MG/ML
INJECTION INTRAMUSCULAR; INTRAVENOUS AS NEEDED
Status: DISCONTINUED | OUTPATIENT
Start: 2017-02-21 | End: 2017-02-21 | Stop reason: HOSPADM

## 2017-02-21 RX ORDER — KETOROLAC TROMETHAMINE 30 MG/ML
INJECTION, SOLUTION INTRAMUSCULAR; INTRAVENOUS AS NEEDED
Status: DISCONTINUED | OUTPATIENT
Start: 2017-02-21 | End: 2017-02-21 | Stop reason: HOSPADM

## 2017-02-21 RX ORDER — HYDROMORPHONE HYDROCHLORIDE 1 MG/ML
0.5 INJECTION, SOLUTION INTRAMUSCULAR; INTRAVENOUS; SUBCUTANEOUS
Status: DISCONTINUED | OUTPATIENT
Start: 2017-02-21 | End: 2017-02-21 | Stop reason: HOSPADM

## 2017-02-21 RX ORDER — INSULIN LISPRO 100 [IU]/ML
INJECTION, SOLUTION INTRAVENOUS; SUBCUTANEOUS ONCE
Status: DISCONTINUED | OUTPATIENT
Start: 2017-02-21 | End: 2017-02-21 | Stop reason: HOSPADM

## 2017-02-21 RX ORDER — ROCURONIUM BROMIDE 10 MG/ML
INJECTION, SOLUTION INTRAVENOUS AS NEEDED
Status: DISCONTINUED | OUTPATIENT
Start: 2017-02-21 | End: 2017-02-21 | Stop reason: HOSPADM

## 2017-02-21 RX ORDER — ONDANSETRON 2 MG/ML
4 INJECTION INTRAMUSCULAR; INTRAVENOUS
Status: COMPLETED | OUTPATIENT
Start: 2017-02-21 | End: 2017-02-21

## 2017-02-21 RX ORDER — AMLODIPINE BESYLATE 2.5 MG/1
2.5 TABLET ORAL
Status: DISCONTINUED | OUTPATIENT
Start: 2017-02-21 | End: 2017-02-28 | Stop reason: HOSPADM

## 2017-02-21 RX ORDER — MIDAZOLAM HYDROCHLORIDE 1 MG/ML
INJECTION, SOLUTION INTRAMUSCULAR; INTRAVENOUS AS NEEDED
Status: DISCONTINUED | OUTPATIENT
Start: 2017-02-21 | End: 2017-02-21 | Stop reason: HOSPADM

## 2017-02-21 RX ORDER — KETOROLAC TROMETHAMINE 30 MG/ML
30 INJECTION, SOLUTION INTRAMUSCULAR; INTRAVENOUS EVERY 6 HOURS
Status: COMPLETED | OUTPATIENT
Start: 2017-02-21 | End: 2017-02-26

## 2017-02-21 RX ADMIN — HYDROMORPHONE HYDROCHLORIDE 0.5 MG: 1 INJECTION, SOLUTION INTRAMUSCULAR; INTRAVENOUS; SUBCUTANEOUS at 17:52

## 2017-02-21 RX ADMIN — HYDROMORPHONE HYDROCHLORIDE 0.5 MG: 1 INJECTION, SOLUTION INTRAMUSCULAR; INTRAVENOUS; SUBCUTANEOUS at 17:00

## 2017-02-21 RX ADMIN — SODIUM CHLORIDE, SODIUM LACTATE, POTASSIUM CHLORIDE, AND CALCIUM CHLORIDE 100 ML/HR: 600; 310; 30; 20 INJECTION, SOLUTION INTRAVENOUS at 19:29

## 2017-02-21 RX ADMIN — FAMOTIDINE 20 MG: 20 TABLET ORAL at 13:37

## 2017-02-21 RX ADMIN — ROCURONIUM BROMIDE 10 MG: 10 INJECTION, SOLUTION INTRAVENOUS at 16:16

## 2017-02-21 RX ADMIN — SODIUM CHLORIDE, SODIUM LACTATE, POTASSIUM CHLORIDE, AND CALCIUM CHLORIDE 100 ML/HR: 600; 310; 30; 20 INJECTION, SOLUTION INTRAVENOUS at 22:10

## 2017-02-21 RX ADMIN — MIDAZOLAM HYDROCHLORIDE 2 MG: 1 INJECTION, SOLUTION INTRAMUSCULAR; INTRAVENOUS at 15:59

## 2017-02-21 RX ADMIN — Medication 2 MG: at 17:15

## 2017-02-21 RX ADMIN — GLYCOPYRROLATE 0.4 MG: 0.2 INJECTION INTRAMUSCULAR; INTRAVENOUS at 17:02

## 2017-02-21 RX ADMIN — DEXAMETHASONE SODIUM PHOSPHATE 4 MG: 4 INJECTION, SOLUTION INTRA-ARTICULAR; INTRALESIONAL; INTRAMUSCULAR; INTRAVENOUS; SOFT TISSUE at 16:10

## 2017-02-21 RX ADMIN — ONDANSETRON 4 MG: 2 INJECTION INTRAMUSCULAR; INTRAVENOUS at 17:01

## 2017-02-21 RX ADMIN — FENTANYL CITRATE 100 MCG: 50 INJECTION, SOLUTION INTRAMUSCULAR; INTRAVENOUS at 16:03

## 2017-02-21 RX ADMIN — HYDROMORPHONE HYDROCHLORIDE 0.5 MG: 1 INJECTION, SOLUTION INTRAMUSCULAR; INTRAVENOUS; SUBCUTANEOUS at 16:22

## 2017-02-21 RX ADMIN — ONDANSETRON 4 MG: 2 INJECTION INTRAMUSCULAR; INTRAVENOUS at 17:44

## 2017-02-21 RX ADMIN — CEFAZOLIN SODIUM 2 G: 2 SOLUTION INTRAVENOUS at 16:11

## 2017-02-21 RX ADMIN — SODIUM CHLORIDE, SODIUM LACTATE, POTASSIUM CHLORIDE, AND CALCIUM CHLORIDE 25 ML/HR: 600; 310; 30; 20 INJECTION, SOLUTION INTRAVENOUS at 13:32

## 2017-02-21 RX ADMIN — PROPOFOL 180 MG: 10 INJECTION, EMULSION INTRAVENOUS at 16:05

## 2017-02-21 RX ADMIN — Medication 3 MG: at 17:02

## 2017-02-21 RX ADMIN — KETOROLAC TROMETHAMINE 30 MG: 30 INJECTION, SOLUTION INTRAMUSCULAR; INTRAVENOUS at 17:01

## 2017-02-21 RX ADMIN — HYDROMORPHONE HYDROCHLORIDE 0.5 MG: 1 INJECTION, SOLUTION INTRAMUSCULAR; INTRAVENOUS; SUBCUTANEOUS at 17:45

## 2017-02-21 RX ADMIN — ONDANSETRON 8 MG: 2 SOLUTION INTRAMUSCULAR; INTRAVENOUS at 20:14

## 2017-02-21 RX ADMIN — KETOROLAC TROMETHAMINE 30 MG: 30 INJECTION, SOLUTION INTRAMUSCULAR at 20:01

## 2017-02-21 RX ADMIN — SUCCINYLCHOLINE CHLORIDE 100 MG: 20 INJECTION INTRAMUSCULAR; INTRAVENOUS at 16:05

## 2017-02-21 RX ADMIN — LIDOCAINE HYDROCHLORIDE 100 MG: 20 INJECTION, SOLUTION EPIDURAL; INFILTRATION; INTRACAUDAL; PERINEURAL at 16:03

## 2017-02-21 RX ADMIN — ROCURONIUM BROMIDE 10 MG: 10 INJECTION, SOLUTION INTRAVENOUS at 16:56

## 2017-02-21 RX ADMIN — ROCURONIUM BROMIDE 20 MG: 10 INJECTION, SOLUTION INTRAVENOUS at 16:09

## 2017-02-21 RX ADMIN — HYDROMORPHONE HYDROCHLORIDE 1 MG: 1 INJECTION, SOLUTION INTRAMUSCULAR; INTRAVENOUS; SUBCUTANEOUS at 21:17

## 2017-02-21 RX ADMIN — AMLODIPINE BESYLATE 2.5 MG: 2.5 TABLET ORAL at 21:17

## 2017-02-21 RX ADMIN — ESMOLOL HYDROCHLORIDE 30 MG: 10 INJECTION INTRAVENOUS at 16:30

## 2017-02-21 RX ADMIN — FENTANYL CITRATE 150 MCG: 50 INJECTION, SOLUTION INTRAMUSCULAR; INTRAVENOUS at 16:12

## 2017-02-21 NOTE — ROUTINE PROCESS
TRANSFER - IN REPORT:    Verbal report received from KAMI Bartlett(name) on Zunilda Islas  being received from PACU(unit) for routine post - op      Report consisted of patients Situation, Background, Assessment and   Recommendations(SBAR). Information from the following report(s) SBAR, Kardex and MAR was reviewed with the receiving nurse. Opportunity for questions and clarification was provided. Assessment completed upon patients arrival to unit and care assumed.

## 2017-02-21 NOTE — PROGRESS NOTES
[address] Patient arrived to the unit in stable condition. Bedside and Verbal shift change report given to KAMI Carrera (oncoming nurse) by Jovanni Soto RN (offgoing nurse). Report included the following information SBAR, Kardex and MAR\.

## 2017-02-21 NOTE — PERIOP NOTES
1722  Patient received in PACU and connected to monitors. Vital signs stable. RN at bedside. Will continue to monitor. 1735  Pt's blood sugar post op = 110, no insulin required per sliding scale. 1800  Pt resting with eyes closed. Occasionally, pt will startle herself awake, moaning. Reassured pt, encouraged to splint abdomen with warm blanket, and pillow placed under knees to alleviate abdominal pain. [address] to pt's  via phone re pt status and room #2205. Pt's  on his way now to hospital.    [address], Maribeth, RN will call back for report. 1820  Pt resting with eyes closed, snoring lightly. Awaiting RN to call for report.

## 2017-02-21 NOTE — PROGRESS NOTES
Date of Surgery Update:  Cari Cerrato was seen and examined. History and physical has been reviewed. The patient has been examined. There have been no significant clinical changes since the completion of the originally dated History and Physical. Will proceed with exploratory laparotomy and small bowel resection.      Signed By: Felisha Roy MD     February 21, 2017 3:43 PM

## 2017-02-21 NOTE — IP AVS SNAPSHOT
Current Discharge Medication List  
  
Take these medications at their scheduled times Dose & Instructions Dispensing Information Comments Morning Noon Evening Bedtime  
 amLODIPine 2.5 mg tablet Commonly known as:  [place] Your next dose is: Today, Tomorrow Other:  ____________ Dose:  2.5 mg Take 2.5 mg by mouth nightly. Refills:  0  
     
   
   
   
  
 docusate sodium 100 mg capsule Commonly known as:  Ethyl Hoit Your next dose is: Today, Tomorrow Other:  ____________ Dose:  100 mg Take 1 Cap by mouth two (2) times a day for 90 days. Quantity:  60 Cap Refills:  2  
     
   
   
   
  
 doxepin 10 mg capsule Commonly known as:  SINEquan Your next dose is: Today, Tomorrow Other:  ____________ Take  by mouth nightly. Refills:  0 FARXIGA 10 mg Tab Generic drug:  dapagliflozin Your next dose is: Today, Tomorrow Other:  ____________ Take  by mouth daily. Indications: type 2 diabetes mellitus Refills:  0 GLUMETZA 500 mg Tg24 24 hour tablet Generic drug:  metFORMIN Your next dose is: Today, Tomorrow Other:  ____________ Dose:  500 mg Take 500 mg by mouth two (2) times a day. Refills:  0 LIPITOR 20 mg tablet Generic drug:  atorvastatin Your next dose is: Today, Tomorrow Other:  ____________ Dose:  40 mg Take 40 mg by mouth daily. Refills:  0  
     
   
   
   
  
 omeprazole 10 mg capsule Commonly known as:  PRILOSEC Your next dose is: Today, Tomorrow Other:  ____________ Dose:  10 mg Take 10 mg by mouth daily. Refills:  0  
     
   
   
   
  
 polyethylene glycol 17 gram/dose powder Commonly known as:  [place] Deck Your next dose is: Today, Tomorrow Other:  ____________ Dose:  17 g Take 17 g by mouth daily. 1 tablespoon with 8 oz of water daily Quantity:  238 g Refills:  0 ZIAC 2.5-6.25 mg per tablet Generic drug:  bisoprolol-hydroCHLOROthiazide Your next dose is: Today, Tomorrow Other:  ____________ Dose:  1 Tab Take 1 Tab by mouth daily. Refills:  0 Take these medications as needed Dose & Instructions Dispensing Information Comments Morning Noon Evening Bedtime  
 albuterol 90 mcg/actuation inhaler Commonly known as:  PROVENTIL HFA, VENTOLIN HFA, PROAIR HFA Your next dose is: Today, Tomorrow Other:  ____________ Dose:  1 Puff Take 1 Puff by inhalation every six (6) hours as needed for Wheezing. Refills:  0  
     
   
   
   
  
 ondansetron hcl 4 mg tablet Commonly known as:  ZOFRAN (AS HYDROCHLORIDE) Your next dose is: Today, Tomorrow Other:  ____________ Dose:  4 mg Take 1 Tab by mouth every eight (8) hours as needed for Nausea. Quantity:  20 Tab Refills:  0

## 2017-02-21 NOTE — PERIOP NOTES
TRANSFER - OUT REPORT:    Verbal report given to KAMI Stahl(name) on Syed Nicolas  being transferred to 2200(unit) for routine post - op       Report consisted of patients Situation, Background, Assessment and   Recommendations(SBAR). Information from the following report(s) SBAR, Kardex, OR Summary, Intake/Output, MAR and Recent Results was reviewed with the receiving nurse. Lines:   Peripheral IV 02/21/17 Left Antecubital (Active)   Site Assessment Clean, dry, & intact 2/21/2017  6:07 PM   Phlebitis Assessment 0 2/21/2017  6:07 PM   Infiltration Assessment 0 2/21/2017  6:07 PM   Dressing Status Clean, dry, & intact 2/21/2017  6:07 PM   Dressing Type Transparent;Tape 2/21/2017  6:07 PM   Hub Color/Line Status Pink; Infusing 2/21/2017  6:07 PM   Action Taken Open ports on tubing capped 2/21/2017  6:07 PM   Alcohol Cap Used Yes 2/21/2017  6:07 PM        Opportunity for questions and clarification was provided.       Patient transported with:   [employer]

## 2017-02-21 NOTE — IP AVS SNAPSHOT
Faye Waggoner 
 
 
 [address] [place] [address] [id number] [place] [address] Patient: Alber Sierra MRN: [id number] DFW:[birth year] You are allergic to the following Allergen Reactions Macrodantin (Nitrofurantoin Macrocrystalline) Itching Tape (Adhesive) Other (comments) Burned skin when had wound vac Recent Documentation Height  
  
  
  
  
  
 1.626 m Emergency Contacts Name Discharge Info Relation Home Work Mobile [facility] DISCHARGE CAREGIVER [3] Spouse [3] [phone] Dewayne Collier  Mother [14]   [phone] About your hospitalization You were admitted on:  February 21, 2017 You last received care in the:  [address] [place] [address],[address] You were discharged on:  February 28, 2017 Unit phone number:  [phone] Why you were hospitalized Your primary diagnosis was:  Not on File Your diagnoses also included:  Sbo (Small Bowel Obstruction) (Hcc) Providers Seen During Your Hospitalizations Provider Role Specialty Primary office phone Phillip Aguilera MD Attending Provider Surgery [phone] Your Primary Care Physician (PCP) Primary Care Physician Office Phone Office Fax Carl Martel [phone] Follow-up Information Follow up With Details Comments Contact Info Alo Cervantes MD   [address] [place] [address] 
[phone] Phillip Aguilera MD   [address] [facility] [address] [facility] [address] [phone] Phillip Aguilera MD In 2 weeks  [address] [facility] [address] [facility] [place] [address] [address] [phone] Your Appointments Monday March 13, 2017  9:00 AM EDT  
POST OP with Phillip Aguilera MD  
[address] [address] [facility] [address] [facility] [address] [phone] Current Discharge Medication List  
  
CONTINUE these medications which have CHANGED Dose & Instructions Dispensing Information Comments Morning Noon Evening Bedtime  
 docusate sodium 100 mg capsule Commonly known as:  Cristi Clemons What changed:   
- when to take this 
- reasons to take this Your next dose is: Today, Tomorrow Other:  _________ Dose:  100 mg Take 1 Cap by mouth two (2) times a day for 90 days. Quantity:  60 Cap Refills:  2  
     
   
   
   
  
 polyethylene glycol 17 gram/dose powder Commonly known as:  Chelle Hernández What changed:   
- when to take this 
- reasons to take this 
- additional instructions Your next dose is: Today, Tomorrow Other:  _________ Dose:  17 g Take 17 g by mouth daily. 1 tablespoon with 8 oz of water daily Quantity:  238 g Refills:  0 CONTINUE these medications which have NOT CHANGED Dose & Instructions Dispensing Information Comments Morning Noon Evening Bedtime  
 albuterol 90 mcg/actuation inhaler Commonly known as:  PROVENTIL HFA, VENTOLIN HFA, PROAIR HFA Your next dose is: Today, Tomorrow Other:  _________ Dose:  1 Puff Take 1 Puff by inhalation every six (6) hours as needed for Wheezing. Refills:  0  
     
   
   
   
  
 amLODIPine 2.5 mg tablet Commonly known as:  Kristofer Presser Your next dose is: Today, Tomorrow Other:  _________ Dose:  2.5 mg Take 2.5 mg by mouth nightly. Refills:  0  
     
   
   
   
  
 doxepin 10 mg capsule Commonly known as:  SINEquan Your next dose is: Today, Tomorrow Other:  _________ Take  by mouth nightly. Refills:  0 FARXIGA 10 mg Tab Generic drug:  dapagliflozin Your next dose is: Today, Tomorrow Other:  _________ Take  by mouth daily. Indications: type 2 diabetes mellitus Refills:  0 GLUMETZA 500 mg Tg24 24 hour tablet Generic drug:  metFORMIN Your next dose is: Today, Tomorrow Other:  _________ Dose:  500 mg Take 500 mg by mouth two (2) times a day. Refills:  0 LIPITOR 20 mg tablet Generic drug:  atorvastatin Your next dose is: Today, Tomorrow Other:  _________ Dose:  40 mg Take 40 mg by mouth daily. Refills:  0  
     
   
   
   
  
 omeprazole 10 mg capsule Commonly known as:  PRILOSEC Your next dose is: Today, Tomorrow Other:  _________ Dose:  10 mg Take 10 mg by mouth daily. Refills:  0  
     
   
   
   
  
 ondansetron hcl 4 mg tablet Commonly known as:  ZOFRAN (AS HYDROCHLORIDE) Your next dose is: Today, Tomorrow Other:  _________ Dose:  4 mg Take 1 Tab by mouth every eight (8) hours as needed for Nausea. Quantity:  20 Tab Refills:  0 ZIAC 2.5-6.25 mg per tablet Generic drug:  bisoprolol-hydroCHLOROthiazide Your next dose is: Today, Tomorrow Other:  _________ Dose:  1 Tab Take 1 Tab by mouth daily. Refills:  0 Discharge Instructions DISCHARGE SUMMARY from Nurse The following personal items are in your possession at time of discharge: 
 
Dental Appliances: None Visual Aid: Glasses, With patient Jewelry: None Clothing: Footwear, Undergarments, Shirt, Pants Other Valuables: None Personal Items Sent to Safe: family PATIENT INSTRUCTIONS: 
 
 
F-face looks uneven A-arms unable to move or move unevenly S-speech slurred or non-existent T-time-call 911 as soon as signs and symptoms begin-DO NOT go Back to bed or wait to see if you get better-TIME IS BRAIN. Warning Signs of HEART ATTACK Call 911 if you have these symptoms: 
? Chest discomfort. Most heart attacks involve discomfort in the center of the chest that lasts more than a few minutes, or that goes away and comes back. It can feel like uncomfortable pressure, squeezing, fullness, or pain. ? Discomfort in other areas of the upper body. Symptoms can include pain or discomfort in one or both arms, the back, neck, jaw, or stomach. ? Shortness of breath with or without chest discomfort. ? Other signs may include breaking out in a cold sweat, nausea, or lightheadedness. Don't wait more than five minutes to call [address]! Fast action can save your life. Calling 911 is almost always the fastest way to get lifesaving treatment. Emergency Medical Services staff can begin treatment when they arrive  up to an hour sooner than if someone gets to the hospital by car. The discharge information has been reviewed with the patient. The patient verbalized understanding. Discharge medications reviewed with the patient and appropriate educational materials and side effects teaching were provided. Instructions Following Surgery Patient: Gabriela Julian MRN: [id number]  SSN: [id number] YOB: 1960  Age: 62 y.o. Sex: female Activity · As tolerated, walking encourage, stairs are okay · Avoid strenuous activities - no lifting anything heavier than 15 pounds. · You may shower at home after 48 hours. Diet · Regular diet Pain · Take pain medication as directed by your doctor ·  Call your doctor if pain is NOT relieved by medication Call your doctor if 
 · Excessive bleeding that does not stop after holding mild pressure over the area · Temperature of 101 degrees F or above · Redness,excessive swelling or bruising, and/or green or yellow, smelly discharge from incision · If nausea and vomiting continues Follow-Up Phone Calls · Call the office at [phone] to make your follow-up appointment Appointment date/time: 2 weeks after the surgery. Dr. Josesito Dewitt cell phone number is [phone]. Please call me if you have any concerns or questions. Discharge Orders None Introducing [facility] [place] [facility]! [facility] introduces [employer] patient portal. Now you can access parts of your medical record, email your doctor's office, and request medication refills online. 1. In your internet browser, go to [email or web address] 2. Click on the First Time User? Click Here link in the Sign In box. You will see the New Member Sign Up page. 3. Enter your [employer] Access Code exactly as it appears below. You will not need to use this code after youve completed the sign-up process. If you do not sign up before the expiration date, you must request a new code. · [employer] Access Code: [email or web address] Expires: 4/22/2017  1:41 PM 
 
4. Enter the last four digits of your Social Security Number (xxxx) and Date of Birth (mm/dd/yyyy) as indicated and click Submit. You will be taken to the next sign-up page. 5. Create a [employer] ID. This will be your [employer] login ID and cannot be changed, so think of one that is secure and easy to remember. 6. Create a [employer] password. You can change your password at any time. 7. Enter your Password Reset Question and Answer. This can be used at a later time if you forget your password. 8. Enter your e-mail address. You will receive e-mail notification when new information is available in [address] 9. Click Sign Up. You can now view and download portions of your medical record. 10. Click the Download Summary menu link to download a portable copy of your medical information. If you have questions, please visit the Frequently Asked Questions section of the [email or web address] website. Remember, MyChart is NOT to be used for urgent needs. For medical emergencies, dial 911. Now available from your iPhone and Android! General Information Please provide this summary of care documentation to your next provider. Patient Signature:  ____________________________________________________________ Date:  ____________________________________________________________  
  
[facility] Provider Signature:  ____________________________________________________________ Date:  ____________________________________________________________

## 2017-02-21 NOTE — ANESTHESIA POSTPROCEDURE EVALUATION
Post-Anesthesia Evaluation and Assessment    Patient: Yumiko Stephenson MRN: [id number]  SSN: [id number]    YOB: 1960  Age: 62 y.o. Sex: female     VS from flow sheet    Cardiovascular Function/Vital Signs  Visit Vitals    /78    Pulse 69    Temp 36.1 °C (97 °F)    Resp 16    Ht 5' 4\" (1.626 m)    Wt 70.3 kg (155 lb)    SpO2 98%    BMI 26.61 kg/m2       Patient is status post general anesthesia for Procedure(s):  LAPAROTOMY EXPLORATORY-SMALL BOWEL RESECTION. Nausea/Vomiting: None    Postoperative hydration reviewed and adequate. Pain:  Pain Scale 1: Visual (02/21/17 1800)  Pain Intensity 1: 5 (02/21/17 1800)   Managed    Neurological Status:   Neuro (WDL): Within Defined Limits (02/21/17 1722)  Neuro  LUE Motor Response: Purposeful;Spontaneous  (02/21/17 1722)  LLE Motor Response: Purposeful;Spontaneous  (02/21/17 1722)  RUE Motor Response: Purposeful;Spontaneous  (02/21/17 1722)  RLE Motor Response: Purposeful;Spontaneous  (02/21/17 1722)   At baseline    Mental Status and Level of Consciousness: Arousable    Pulmonary Status:   O2 Device: Oxygen mask (02/21/17 1730)   Adequate oxygenation and airway patent    Complications related to anesthesia: None    Post-anesthesia assessment completed.  No concerns    Signed By: Lobo Aguirre MD     February 21, 2017

## 2017-02-21 NOTE — BRIEF OP NOTE
BRIEF OPERATIVE NOTE    Date of Procedure: 2/21/2017   Preoperative Diagnosis: RIGHT LOWER QUADRANT ABDOMINAL PAIN R10.31  Postoperative Diagnosis: RIGHT LOWER QUADRANT ABDOMINAL PAIN R10.31    Procedure(s):  LAPAROTOMY EXPLORATORY-SMALL BOWEL RESECTION  Surgeon(s) and Role:     * Maira Lawton MD - Primary            Surgical Staff:  Circ-1: Charo Roman  Scrub Tech-1: Moishe Louder Felty  Surg Asst-1: Jose J Ortiz  Event Time In   Incision Start 1612   Incision Close 1705     Anesthesia: General   Estimated Blood Loss: minimal  Specimens:   ID Type Source Tests Collected by Time Destination   1 : SMALL BOWEL Preservative Bowel Contents  Maira Lawton MD 2/21/2017 1657 Pathology      Findings: severe adhesions   Complications: none  Implants: * No implants in log *

## 2017-02-21 NOTE — ANESTHESIA PREPROCEDURE EVALUATION
Anesthetic History   No history of anesthetic complications            Review of Systems / Medical History  Patient summary reviewed and pertinent labs reviewed    Pulmonary            Asthma     Comments: Current Smoker? NO       Elective Surgery? Yes       Abstained from smoking 24 hours prior to anesthesia? N/A    Risk Factors for Postoperative nausea/vomiting:       History of postoperative nausea/vomiting? NO       Female? YES       Motion sickness? NO       Intended opioid administration for postoperative analgesia?   YES   Neuro/Psych   Within defined limits           Cardiovascular    Hypertension                   GI/Hepatic/Renal     GERD           Endo/Other    Diabetes: type 2    Obesity and arthritis     Other Findings              Physical Exam    Airway  Mallampati: III  TM Distance: 4 - 6 cm  Neck ROM: decreased range of motion   Mouth opening: Diminished (comment)     Cardiovascular    Rhythm: irregular  Rate: normal         Dental    Dentition: Poor dentition     Pulmonary  Breath sounds clear to auscultation               Abdominal  GI exam deferred       Other Findings            Anesthetic Plan    ASA: 3  Anesthesia type: general          Induction: Intravenous  Anesthetic plan and risks discussed with: Patient

## 2017-02-22 LAB
GLUCOSE BLD STRIP.AUTO-MCNC: 133 MG/DL (ref 70–110)
GLUCOSE BLD STRIP.AUTO-MCNC: 157 MG/DL (ref 70–110)

## 2017-02-22 PROCEDURE — 65270000029 HC RM PRIVATE

## 2017-02-22 PROCEDURE — 74011250637 HC RX REV CODE- 250/637: Performed by: SURGERY

## 2017-02-22 PROCEDURE — 74011250636 HC RX REV CODE- 250/636: Performed by: COLON & RECTAL SURGERY

## 2017-02-22 PROCEDURE — 77030020255 HC SOL INJ LR 1000ML BG

## 2017-02-22 PROCEDURE — 74011636637 HC RX REV CODE- 636/637: Performed by: SURGERY

## 2017-02-22 PROCEDURE — 82962 GLUCOSE BLOOD TEST: CPT

## 2017-02-22 PROCEDURE — 74011250636 HC RX REV CODE- 250/636: Performed by: SURGERY

## 2017-02-22 RX ORDER — DIPHENHYDRAMINE HCL 25 MG
25 CAPSULE ORAL
Status: DISCONTINUED | OUTPATIENT
Start: 2017-02-22 | End: 2017-02-22

## 2017-02-22 RX ORDER — DIPHENHYDRAMINE HYDROCHLORIDE 50 MG/ML
12.5 INJECTION, SOLUTION INTRAMUSCULAR; INTRAVENOUS ONCE
Status: COMPLETED | OUTPATIENT
Start: 2017-02-22 | End: 2017-02-22

## 2017-02-22 RX ORDER — DIPHENHYDRAMINE HYDROCHLORIDE 50 MG/ML
12.5 INJECTION, SOLUTION INTRAMUSCULAR; INTRAVENOUS
Status: DISCONTINUED | OUTPATIENT
Start: 2017-02-22 | End: 2017-02-23

## 2017-02-22 RX ORDER — INSULIN LISPRO 100 [IU]/ML
INJECTION, SOLUTION INTRAVENOUS; SUBCUTANEOUS
Status: DISCONTINUED | OUTPATIENT
Start: 2017-02-22 | End: 2017-02-28 | Stop reason: HOSPADM

## 2017-02-22 RX ORDER — MAGNESIUM SULFATE 100 %
16 CRYSTALS MISCELLANEOUS AS NEEDED
Status: DISCONTINUED | OUTPATIENT
Start: 2017-02-22 | End: 2017-02-28 | Stop reason: HOSPADM

## 2017-02-22 RX ORDER — DEXTROSE 50 % IN WATER (D50W) INTRAVENOUS SYRINGE
25-50 AS NEEDED
Status: DISCONTINUED | OUTPATIENT
Start: 2017-02-22 | End: 2017-02-28 | Stop reason: HOSPADM

## 2017-02-22 RX ADMIN — AMLODIPINE BESYLATE 2.5 MG: 2.5 TABLET ORAL at 22:14

## 2017-02-22 RX ADMIN — KETOROLAC TROMETHAMINE 30 MG: 30 INJECTION, SOLUTION INTRAMUSCULAR at 02:00

## 2017-02-22 RX ADMIN — DIPHENHYDRAMINE HYDROCHLORIDE 12.5 MG: 50 INJECTION, SOLUTION INTRAMUSCULAR; INTRAVENOUS at 22:14

## 2017-02-22 RX ADMIN — HYDROMORPHONE HYDROCHLORIDE 1 MG: 1 INJECTION, SOLUTION INTRAMUSCULAR; INTRAVENOUS; SUBCUTANEOUS at 00:08

## 2017-02-22 RX ADMIN — ONDANSETRON 8 MG: 2 SOLUTION INTRAMUSCULAR; INTRAVENOUS at 20:47

## 2017-02-22 RX ADMIN — ONDANSETRON 8 MG: 2 SOLUTION INTRAMUSCULAR; INTRAVENOUS at 02:00

## 2017-02-22 RX ADMIN — HYDROMORPHONE HYDROCHLORIDE 1 MG: 1 INJECTION, SOLUTION INTRAMUSCULAR; INTRAVENOUS; SUBCUTANEOUS at 19:04

## 2017-02-22 RX ADMIN — DIPHENHYDRAMINE HYDROCHLORIDE 12.5 MG: 50 INJECTION, SOLUTION INTRAMUSCULAR; INTRAVENOUS at 19:04

## 2017-02-22 RX ADMIN — HYDROMORPHONE HYDROCHLORIDE 1 MG: 1 INJECTION, SOLUTION INTRAMUSCULAR; INTRAVENOUS; SUBCUTANEOUS at 14:43

## 2017-02-22 RX ADMIN — ATORVASTATIN CALCIUM 40 MG: 40 TABLET, FILM COATED ORAL at 08:49

## 2017-02-22 RX ADMIN — HYDROMORPHONE HYDROCHLORIDE 1 MG: 1 INJECTION, SOLUTION INTRAMUSCULAR; INTRAVENOUS; SUBCUTANEOUS at 23:22

## 2017-02-22 RX ADMIN — HYDROMORPHONE HYDROCHLORIDE 1 MG: 1 INJECTION, SOLUTION INTRAMUSCULAR; INTRAVENOUS; SUBCUTANEOUS at 06:34

## 2017-02-22 RX ADMIN — DIPHENHYDRAMINE HYDROCHLORIDE 12.5 MG: 50 INJECTION, SOLUTION INTRAMUSCULAR; INTRAVENOUS at 12:58

## 2017-02-22 RX ADMIN — SODIUM CHLORIDE, SODIUM LACTATE, POTASSIUM CHLORIDE, AND CALCIUM CHLORIDE 100 ML/HR: 600; 310; 30; 20 INJECTION, SOLUTION INTRAVENOUS at 19:05

## 2017-02-22 RX ADMIN — INSULIN LISPRO 2 UNITS: 100 INJECTION, SOLUTION INTRAVENOUS; SUBCUTANEOUS at 17:25

## 2017-02-22 RX ADMIN — ONDANSETRON 8 MG: 2 SOLUTION INTRAMUSCULAR; INTRAVENOUS at 08:49

## 2017-02-22 RX ADMIN — KETOROLAC TROMETHAMINE 30 MG: 30 INJECTION, SOLUTION INTRAMUSCULAR at 08:48

## 2017-02-22 RX ADMIN — HYDROMORPHONE HYDROCHLORIDE 1 MG: 1 INJECTION, SOLUTION INTRAMUSCULAR; INTRAVENOUS; SUBCUTANEOUS at 10:26

## 2017-02-22 RX ADMIN — KETOROLAC TROMETHAMINE 30 MG: 30 INJECTION, SOLUTION INTRAMUSCULAR at 20:47

## 2017-02-22 RX ADMIN — ONDANSETRON 8 MG: 2 SOLUTION INTRAMUSCULAR; INTRAVENOUS at 14:42

## 2017-02-22 RX ADMIN — HYDROMORPHONE HYDROCHLORIDE 1 MG: 1 INJECTION, SOLUTION INTRAMUSCULAR; INTRAVENOUS; SUBCUTANEOUS at 04:29

## 2017-02-22 RX ADMIN — KETOROLAC TROMETHAMINE 30 MG: 30 INJECTION, SOLUTION INTRAMUSCULAR at 15:28

## 2017-02-22 RX ADMIN — SODIUM CHLORIDE, SODIUM LACTATE, POTASSIUM CHLORIDE, AND CALCIUM CHLORIDE 100 ML/HR: 600; 310; 30; 20 INJECTION, SOLUTION INTRAVENOUS at 08:39

## 2017-02-22 NOTE — PROGRESS NOTES
Nutrition initial assessment/Plan of care      RECOMMENDATIONS:   1. Clear liquid diet. Advance diet when medically indicated  2. Monitor weight and PO intake  3. RD to follow     GOALS:   1. PO intake meets >75% of protein/calorie needs by 2/27  2. Weight Maintenance (+/- 1-2 lb) by 2/29       ASSESSMENT:   Per BMI of 26.6, weight is in the overweight classification. PO intake is poor vs clear liquid diet. Nutrition recommendations listed. RD to follow. Nutrition Diagnoses: Altered GI function related to recent GI surgeries as evidenced by clear liquid diet. Nutrition Risk:  [] High  [x] Moderate []  Low    SUBJECTIVE/OBJECTIVE:   Pt admitted for right lower quadrant abdominal pain and chronic SBO. She had a small bowel resection on 2/21 and extensive lysis of adhesions. SHe has h/o of diabetes but she eats a regular diet. Pt was concerned that her nurse isn't taking her BG. She is taking small amount of clear liquids at the time. Pt states that her weight suddenly dropped to 140 lb but gained the weight back. No known food allergy.        Information Obtained from:    [x] Chart Review   [x] Patient   [x] Family/Caregiver   [] Nurse/Physician   [] Interdisciplinary Meeting/Rounds    Dx: SBO  Diet: Clear liquid  Medications: [x] Reviewed  (LR: 100 ml/hr)  Allergies: [x] Reviewed   Past Medical History:   Diagnosis Date    Abdominal adhesions     Anemia     Arthritis     all over the body    Asthma     Asthma     Diabetes ([address]     Diabetes mellitus     Dyskinesia     bilateral    Essential hypertension     GERD (gastroesophageal reflux disease)     Hypertension     Menopause     Stool color black       Labs:    Lab Results   Component Value Date/Time    Sodium 144 02/06/2017 09:36 PM    Potassium 4.1 02/06/2017 09:36 PM    Chloride 106 02/06/2017 09:36 PM    CO2 32 02/06/2017 09:36 PM    Anion gap 6 02/06/2017 09:36 PM    Glucose 112 02/06/2017 09:36 PM    BUN 20 02/06/2017 09:36 PM Creatinine 1.25 02/06/2017 09:36 PM    Calcium 9.7 02/06/2017 09:36 PM    Magnesium 2.5 12/04/2016 06:02 PM    Albumin 3.7 01/22/2017 02:00 PM     Anthropometrics: BMI (calculated): 26.6  Last 3 Recorded Weights in this Encounter    02/16/17 0915 02/21/17 1304   Weight: 71.2 kg (157 lb) 70.3 kg (155 lb)      Ht Readings from Last 1 Encounters:   02/21/17 5' 4\" (1.626 m)       IBW: 120 lb %IBW: 129%    Estimated Nutrition Needs: [x] MSJ  [] Other:  Calories: 2740-4867 kcal Based on:   [x] Actual BW    Protein:   70-80 g Based on:   [x] Actual BW    Fluid:       1873-4279 ml Based on:  [x] Actual BW      [x] No Cultural, Alevism or ethnic dietary need identified.     [] Cultural, Alevism and ethnic food preferences identified and addressed     Wt Status:  [] Normal (18.6 - 24.9) [] Underweight (<18.5) [x] Overweight (25 - 29.9) [] Mild Obesity (30 - 34.9)  [] Moderate Obesity (35 - 39.9) [] Morbid Obesity (40+)   [] Moderate Malnutrition [] Severe Malnutrition in the context of :     Nutrition Problems Identified:   [x] Suboptimal PO intake   [] Food Allergies  [] Difficulty chewing/swallowing/poor dentition  [] Constipation/Diarrhea   [] Nausea/Vomiting   [] None  [] Other:     Plan:   [x] Therapeutic Diet  []  Obtained/adjusted food preferences/tolerances and/or snacks options   []  Supplements added   [] Occupational therapy following for feeding techniques  []  HS snack added   []  Modify diet texture   []  Modify diet for food allergies   [x]  Educate patient   []  Assist with menu selection   [x]  Monitor PO intake on meal rounds   [x]  Continue inpatient monitoring and intervention   []  Participated in discharge planning/Interdisciplinary rounds/Team meetings   []  Other:     Education Needs:   [] Not appropriate for teaching at this time due to:   [x] Identified and addressed    Nutrition Monitoring and Evaluation:  [x] Continue ongoing monitoring and intervention  [] Other    Indu Brock RD  Pager: [phone]

## 2017-02-22 NOTE — PROGRESS NOTES
Received bedside verbal report from [facility]. Patient resting in bed,bed at the lowest position,call bell within reach,white board updated. 0840 Helped pt to use bathroom,pt asking for pain med,will give her shortly. 0849 Pain med given,morning assessment done,makes her comfortable. 1000 Patient is resting,stating pain went down a little bit,will continue to monitor her. 1020 Pt is asking again for pain medicine. 1200 Changed dressing,used 4*4,ABD pad and silk tape,incision size well approximated,no drainage from incision,pt complaining of itching after when she gets pain med,will call the doctor to get order. 1441 [facility] telephone order with read back order for Benadryl IV 25 mg every 6 hours as needed. 1258 Patient stating that she just passed the gas,also she is wondering that she suppose to have a blood sugar checked,she is taking BS medicine at home. 1440 Patient having pain,pain med given,will continue to monitor her. 1530 Patient resting in bed,stating that her pain went down a little,families at bedside,bed at the lowest position. 1645 Patient is complaining of itching,explained her that it's not time yet for her another dose of benadryl,helped pt to go to the bathroom. 1725 Patient resting in bed,families at bedside,bed at the lowest position. 1830 Pt resting in bed,will continue to monitor her. Bedside and Verbal shift change report given to [facility] (oncoming nurse) by Jeanie Goodrich (offgoing nurse). Report included the following information SBAR, Kardex, OR Summary, Intake/Output, MAR and Recent Results.

## 2017-02-22 NOTE — PROGRESS NOTES
[facility]   Discharge Planning/ Assessment    Reasons for Intervention: Interviewed patient, she agrees to share her discharge information with her , see below. She was independent prior to admission and sees Dr Les Delaney for her primary care needs. Her discharge plan is to return home.      High Risk Criteria  [x] Yes  []No   Physician Referral  [] Yes  [x]No        Date    Nursing Referral  [] Yes  [x]No        Date    Patient/Family Request  [] Yes  [x]No        Date       Resources:    Medicare  [] Yes  [x]No   Medicaid  [] Yes  [x]No   No Resources  [] Yes  [x]No   Private Insurance  [x] Yes  []No    Name/Phone Number    Other  [] Yes  [x]No        (i.e. Workman's Comp)         Prior Services:    Prior Services  [] Yes  [x]No   Home Health  [] Yes  [x]No   [address] [place]  [] Yes  [x]No        [address] of [address] Program  [] Yes  [x]No       Meals on Wheels  [] Yes  [x]No   Office on Aging  [] Yes  [x]No   Transportation Services  [] Yes  [x]No   Nursing Home  [] Yes  [x]No        Nursing Home Name    [facility]  [] Yes  [x]No        [address] Name    Other       Information Source:      Information obtained from  [x] Patient  [] Parent   [] [facility]   [] Child  [] Spouse   [] Significant Other/Partner   [] Friend      [] EMS    [] Nursing Home Chart          [x] Other: chart   Chart Review  [x] Yes  []No     Family/Support System:    Patient lives with  [] Alone    [x] Spouse   [] Significant Other  [] Children  [] Caretaker   [] Parent  [] Sibling     [] Other       Other Support System:    Is the patient responsible for care of others  [] Yes  [x]No   Information of person caring for patient on  discharge self   Managers financial affairs independently  [x] Yes  []No   If no, explain:      Status Prior to Admission:    Mental Status  [x] Awake  [x] Alert  [x] Oriented  [] Quiet/Calm [] Lethargic/Sedated   [] Disoriented  [] Restless/Anxious  [] Combative   Personal Care  [] Dependent  [x] Independent Personal Care  [] Requires Assistance   Meal Preparation Ability  [x] Independent   [] Standby Assistance   [] Minimal Assistance   [] Moderate Assistance  [] Maximum Assistance     [] Total Assistance   Chores  [x] Independent with Chores   [] N/A Nursing Home Resident   [] Requires Assistance   Bowel/Bladder  [x] Continent  [] Catheter  [] Incontinent  [] Ostomy Self-Care    [] Urine Diversion Self-Care  [] Maximum Assistance     [] Total Assistance   Number of Persons needed for assistance    DME at home  [] [address], Sherral Cord  [] [address], Straight   [] Commode    [] Bathroom/Grab Bars  [] Hospital Bed  [] Nebulizer  [] Oxygen           [] Raised Toilet Seat  [] Shower Chair  [] Side Rails for Bed   [] Tub Transfer Bench   [] Nabil Love  [] [address], Standard      [] Other:   Vendor      Treatment Presently Receiving:    Current Treatments  [] Chemotherapy  [] Dialysis  [] Insulin  [] IVAB [x] IVF   [] O2  [] PCA   [] PT   [] RT   [] Tube Feedings   [] Wound Care     Psychosocial Evaluation:    Verbalized Knowledge of Disease Process  [x] Patient  [x]Family   Coping with Disease Process  [x] Patient  [x]Family   Requires Further Counseling Coping with Disease Process  [] Patient  []Family     Identified Projected Needs:    Home Health Aid  [] Yes  [x]No   Transportation  [] Yes  [x]No   Education  [] Yes  [x]No        Specific Education     Financial Counseling  [] Yes  [x]No   Inability to Care for Self/Will Require 24 hour care  [] Yes  [x]No   Pain Management  [] Yes  [x]No   Home Infusion Therapy  [] Yes  [x]No   Oxygen Therapy  [] Yes  [x]No   DME  [] Yes  [x]No   Long Term Care Placement  [] Yes  [x]No   Rehab  [] Yes  [x]No   Physical Therapy  [] Yes  [x]No   Needs Anticipated At This Time  [] Yes  [x]No     Intra-Hospital Referral:    [facility]  [] Yes  [x]No     [] Yes  [x]No   Patient Representative  [] Yes  [x]No Staff for Teaching Needs  [] Yes  [x]No   Specialty Teaching Needs     Diabetic Educator  [] Yes  [x]No   Referral for Diabetic Educator Needed  [] Yes  [x]No  If Yes, place order for Nutritionist or Diabetic Consult     Tentative Discharge Plan:    Home with No Services  [x] Yes  []No   Home with [address]  [] Yes  [x]No        If Yes, specify type    [address]  [] Yes  [x]No        If Yes, specify type    Meals on Wheels  [] Yes  [x]No   Office of Aging  [] Yes  [x]No   NHP  [] Yes  [x]No   Return to the Nursing Home  [] Yes  [x]No   Rehab Therapy  [] Yes  [x]No   Acute Rehab  [] Yes  [x]No   Subacute Rehab  [] Yes  [x]No   Private Care  [] Yes  [x]No   Substance Abuse Referral  [] Yes  [x]No   Transportation  [] Yes  [x]No   Chore Service  [] Yes  [x]No   Inpatient Hospice  [] Yes  [x]No   OP RT  [] Yes  [x] No   OP Hemo  [] Yes  [x] No   OP PT  [] Yes  [x]No   Support Group  [] Yes  [x]No   Reach to Recovery  [] Yes  [x]No   OP Oncology Clinic  [] Yes  [x]No   Clinic Appointment  [] Yes  [x]No   DME  [] Yes  [x]No   Comments    Name of D/C Planner or  Given to Patient or Family Shree Miller RN   Phone Number [phone]   Date Feb 22, 2017   Time 712 am   If you are discharged home, whom do you designate to participate in your discharge plan and receive any information needed?      Enter name of [facility]  spouse        Phone # of designee         Address of [address] [place] [address]        Updated Feb 22, 2017        Patient refused to designate any           individual

## 2017-02-22 NOTE — OP NOTES
Mitul Dixon    Name:  Yazmin Clark  MR#:  [id number]  :  1960  Account #:  [de-identified]  Date of Adm:  2017  Date of Surgery:  2017      PREOPERATIVE DIAGNOSIS: Chronic small-bowel obstruction. POSTOPERATIVE DIAGNOSIS: Chronic small-bowel obstruction. PROCEDURES PERFORMED  1. Exploratory laparotomy. 2. Extensive lysis of adhesions. 3. Small bowel resection with primary anastomosis. ANESTHESIA: General.    COMPLICATIONS: None. SPECIMENS REMOVED: Small bowel. ESTIMATED BLOOD LOSS: Minimal.    DESCRIPTION OF PROCEDURE: The patient was brought to the  operating room, anesthesia was induced. Scrubbing and draping of the  abdomen was done in the usual manner. A timeout was performed. A  midline incision was performed. The patient already had an  abdominoplasty. There was no umbilicus. Old mesh was identified. It  was adherent to the fascia. This was opened in the midline. The  abdomen was entered. There were significant adhesions, very  extensive. These were lysed using Metzenbaum. More than 3/4 of  surgery was done using lysis of adhesion. After full lysis of adhesion,  we were able to identify the previous anastomosis. There was a  discrepancy between the bowel sized proximal and distal to the  anastomosis. I ran the whole small bowel from the ligament of Treitz to  the ileocecal valve. At this point, decision was made to revise the  anastomosis, so a JESSICA blue load was fired about 10 cm from the  anastomosis proximally and distally and the mesentery of the small  bowel was taken using the LigaSure impact. At this point, a side-to-  side anastomosis using Endo JESSICA blue load was performed and the  anastomosis was checked and there was no evidence of any leak. Hemostasis was secured.  There was no omentum to use, so at this  point the abdomen was closed using the old mesh and the fascia with  a running #1 PDS suture, and skin staples were placed. The patient  tolerated the procedure well and was transferred to the recovery room  in stable condition.         MD Miah Jett / Willam Barron  D:  02/22/2017   09:00  T:  02/22/2017   09:21  Job #:  [id number]

## 2017-02-22 NOTE — PROGRESS NOTES
Patient has designated ______________her husband__________ to participate in his/her discharge plan and to receive any needed information.      Name:   Binh Chávez  spouse   [phone] [address],  [address]   Feb 22, 2017     Address:  Phone number:

## 2017-02-22 NOTE — PROGRESS NOTES
1910 Received shift report from [facility], RN. Pt lying in bed with no complaints. Bed in lowest position, call bell within reach, will continue to monitor. 2000 Pt ambulated to chair, call bell within reach. Pt has hypoactive bowls, not passing flatus. Educated pt on ICS, pt performed 3 times with my assistance. No orders for VTE prophylaxis, put pt on SCDs. Explained NPO status to pt and the use of the SCDs. Pt sitting in chair, call bell within reach, will continue to monitor. 2100 Pt complains of pain, administered pain medication. Pt ambulated to bathroom and returned to bed. Pt using ICS independently. Bed in lowest position, call bell within reach, will continue to monitor. 0000 Pt crying out in pain, but does not want to take anything despite pain being a 10. Encouraged pt to take IV pain medication, which she did. Educated on guarding during movement and coughing. Pt ambulated to restroom where she voided urine. Notified Pharmacy to reschedule Zofran and Toradol due to late admission upon pt's arrival to unit. Returned pt to bed, bed in lowest position, call bell within reach, will continue to monitor. 0300 Pt sleeping with no complaints. Bed in lowest position, call bell within reach, will continue to monitor. 0430 Pt crying in bed, administered pain medication. Re-educated pt on PRN status of pain medication. Bed in lowest position, call bell within reach, will continue to monitor. 0630 Pt complains of pain, administered pain medication. Pt ambulated room to try and relief some pain before receiving the pain medication. Pt lying in bed, call bell within reach, will continue to monitor. Bedside and Verbal shift change report given to Ottoniel Guzman, [facility], RN (oncoming nurse) by Janice Paul RN (offgoing nurse). Report included the following information SBAR, Kardex, Intake/Output, MAR and Recent Results.

## 2017-02-22 NOTE — PROGRESS NOTES
Received verbal orders with read back from Dr. Olamide Nayak for dressing change to 4x4, ABD pad and tape.

## 2017-02-22 NOTE — PROGRESS NOTES
Patient seen and examined. Doing well. Complaining of incisional pain. No tachycardia. No fever. Abdomen is soft. Incisional tenderness. Plan:  Allow limited clear liquid diet  Ambulation  IS  Awaiting bowel function.

## 2017-02-22 NOTE — PROGRESS NOTES
conducted an initial consultation and Spiritual Assessment for Cari Cerrato, who is a 62 y.o.,female. Patients Primary Language is: [place]. According to the patients EMR Baptism Affiliation is: [facility].     The reason the Patient came to the hospital is:   Patient Active Problem List    Diagnosis Date Noted    SBO (small bowel obstruction) ([facility] [address] 02/21/2017    Osteoarthritis of left knee 06/27/2016    Hypertension 06/27/2016    Hyperlipidemia 06/27/2016    GERD (gastroesophageal reflux disease) 06/27/2016    Asthma 06/27/2016    Type 2 diabetes mellitus ([facility] [address] 06/27/2016        The  provided the following Interventions:  Initiated a relationship of care and support. Explored issues of meeta, belief, spirituality and Hinduism/ritual needs while hospitalized. Listened empathically. Provided information about Spiritual Care Services. Offered prayer and assurance of continued prayers on patient's behalf. Chart reviewed. The following outcomes were achieved:  Patient shared limited information about both their medical narrative and spiritual journey/beliefs. Patient processed feeling about current hospitalization. Patient expressed gratitude for 's visit. Assessment:  Patient does not have any Hinduism/cultural needs that will affect patients preferences in health care. There are no further spiritual or Hinduism issues which require intervention at this time. Plan:  Chaplains will continue to follow and will provide pastoral care on an as needed/requested basis.  recommends bedside caregivers page  on duty if patient shows signs of acute spiritual or emotional distress. Bri Hdez M.Div.   [facility]  [phone]

## 2017-02-23 LAB
GLUCOSE BLD STRIP.AUTO-MCNC: 119 MG/DL (ref 70–110)
GLUCOSE BLD STRIP.AUTO-MCNC: 121 MG/DL (ref 70–110)
GLUCOSE BLD STRIP.AUTO-MCNC: 126 MG/DL (ref 70–110)
GLUCOSE BLD STRIP.AUTO-MCNC: 200 MG/DL (ref 70–110)

## 2017-02-23 PROCEDURE — 65270000029 HC RM PRIVATE

## 2017-02-23 PROCEDURE — 74011636637 HC RX REV CODE- 636/637: Performed by: SURGERY

## 2017-02-23 PROCEDURE — 77030020255 HC SOL INJ LR 1000ML BG

## 2017-02-23 PROCEDURE — 74011250637 HC RX REV CODE- 250/637: Performed by: SURGERY

## 2017-02-23 PROCEDURE — 74011250636 HC RX REV CODE- 250/636: Performed by: SURGERY

## 2017-02-23 PROCEDURE — 82962 GLUCOSE BLOOD TEST: CPT

## 2017-02-23 RX ORDER — OXYCODONE AND ACETAMINOPHEN 5; 325 MG/1; MG/1
1 TABLET ORAL
Status: DISCONTINUED | OUTPATIENT
Start: 2017-02-23 | End: 2017-02-25

## 2017-02-23 RX ORDER — ONDANSETRON 2 MG/ML
8 INJECTION INTRAMUSCULAR; INTRAVENOUS
Status: DISCONTINUED | OUTPATIENT
Start: 2017-02-23 | End: 2017-02-28 | Stop reason: HOSPADM

## 2017-02-23 RX ORDER — DOCUSATE SODIUM 100 MG/1
100 CAPSULE, LIQUID FILLED ORAL 2 TIMES DAILY
Status: DISCONTINUED | OUTPATIENT
Start: 2017-02-23 | End: 2017-02-28 | Stop reason: HOSPADM

## 2017-02-23 RX ORDER — DIPHENHYDRAMINE HYDROCHLORIDE 50 MG/ML
12.5 INJECTION, SOLUTION INTRAMUSCULAR; INTRAVENOUS
Status: DISCONTINUED | OUTPATIENT
Start: 2017-02-23 | End: 2017-02-27

## 2017-02-23 RX ADMIN — DIPHENHYDRAMINE HYDROCHLORIDE 12.5 MG: 50 INJECTION, SOLUTION INTRAMUSCULAR; INTRAVENOUS at 08:54

## 2017-02-23 RX ADMIN — OXYCODONE HYDROCHLORIDE AND ACETAMINOPHEN 1 TABLET: 5; 325 TABLET ORAL at 22:01

## 2017-02-23 RX ADMIN — OXYCODONE HYDROCHLORIDE AND ACETAMINOPHEN 1 TABLET: 5; 325 TABLET ORAL at 17:52

## 2017-02-23 RX ADMIN — ATORVASTATIN CALCIUM 40 MG: 40 TABLET, FILM COATED ORAL at 09:58

## 2017-02-23 RX ADMIN — INSULIN LISPRO 4 UNITS: 100 INJECTION, SOLUTION INTRAVENOUS; SUBCUTANEOUS at 17:51

## 2017-02-23 RX ADMIN — KETOROLAC TROMETHAMINE 30 MG: 30 INJECTION, SOLUTION INTRAMUSCULAR at 01:49

## 2017-02-23 RX ADMIN — KETOROLAC TROMETHAMINE 30 MG: 30 INJECTION, SOLUTION INTRAMUSCULAR at 09:58

## 2017-02-23 RX ADMIN — SODIUM CHLORIDE, SODIUM LACTATE, POTASSIUM CHLORIDE, AND CALCIUM CHLORIDE 50 ML/HR: 600; 310; 30; 20 INJECTION, SOLUTION INTRAVENOUS at 22:50

## 2017-02-23 RX ADMIN — HYDROMORPHONE HYDROCHLORIDE 1 MG: 1 INJECTION, SOLUTION INTRAMUSCULAR; INTRAVENOUS; SUBCUTANEOUS at 04:27

## 2017-02-23 RX ADMIN — OXYCODONE HYDROCHLORIDE AND ACETAMINOPHEN 1 TABLET: 5; 325 TABLET ORAL at 12:40

## 2017-02-23 RX ADMIN — KETOROLAC TROMETHAMINE 30 MG: 30 INJECTION, SOLUTION INTRAMUSCULAR at 20:27

## 2017-02-23 RX ADMIN — DOCUSATE SODIUM 100 MG: 100 CAPSULE, LIQUID FILLED ORAL at 17:53

## 2017-02-23 RX ADMIN — DOCUSATE SODIUM 100 MG: 100 CAPSULE, LIQUID FILLED ORAL at 09:58

## 2017-02-23 RX ADMIN — DIPHENHYDRAMINE HYDROCHLORIDE 12.5 MG: 50 INJECTION, SOLUTION INTRAMUSCULAR; INTRAVENOUS at 14:03

## 2017-02-23 RX ADMIN — DIPHENHYDRAMINE HYDROCHLORIDE 12.5 MG: 50 INJECTION, SOLUTION INTRAMUSCULAR; INTRAVENOUS at 01:49

## 2017-02-23 RX ADMIN — HYDROMORPHONE HYDROCHLORIDE 1 MG: 1 INJECTION, SOLUTION INTRAMUSCULAR; INTRAVENOUS; SUBCUTANEOUS at 06:32

## 2017-02-23 RX ADMIN — SODIUM CHLORIDE, SODIUM LACTATE, POTASSIUM CHLORIDE, AND CALCIUM CHLORIDE 100 ML/HR: 600; 310; 30; 20 INJECTION, SOLUTION INTRAVENOUS at 05:18

## 2017-02-23 RX ADMIN — OXYCODONE HYDROCHLORIDE AND ACETAMINOPHEN 1 TABLET: 5; 325 TABLET ORAL at 08:41

## 2017-02-23 RX ADMIN — AMLODIPINE BESYLATE 2.5 MG: 2.5 TABLET ORAL at 22:01

## 2017-02-23 RX ADMIN — ONDANSETRON 8 MG: 2 SOLUTION INTRAMUSCULAR; INTRAVENOUS at 01:49

## 2017-02-23 RX ADMIN — KETOROLAC TROMETHAMINE 30 MG: 30 INJECTION, SOLUTION INTRAMUSCULAR at 14:06

## 2017-02-23 RX ADMIN — DIPHENHYDRAMINE HYDROCHLORIDE 12.5 MG: 50 INJECTION, SOLUTION INTRAMUSCULAR; INTRAVENOUS at 17:54

## 2017-02-23 RX ADMIN — DIPHENHYDRAMINE HYDROCHLORIDE 12.5 MG: 50 INJECTION, SOLUTION INTRAMUSCULAR; INTRAVENOUS at 22:02

## 2017-02-23 NOTE — PROGRESS NOTES
1925 Received shift report from Marjorie, [facility]. Pt lying in be with no complaints. Bed in lowest position, call bell within reach, will continue to monitor. 2100 Pt complains of sever itching. Pt is not due for Benadryl for 4 hours. Lotion does not seem to relieve itching. Called MD and got order for STAT Benadryl. 2200 Pt stated itching has subsided. Will continue to monitor. 2315 Pt complains of pain, administered pain medication. Pt ambulated to restroom, voided urine. Pt using ICS independently. Bed in lowest position, call bell within reach, will continue to monitor. 0400 Pt asked for SCDs to be taken off, re-educated pt on their use, however pt is ambulating frequently. Pt complains of pain, administered pain medication. Pt ambulated to the restroom where she voided, pt passing flatus. Bedside and Verbal shift change report given to Celina Waters RN (oncoming nurse) by Jesus Alberto Kilgore RN (offgoing nurse). Report included the following information SBAR, Kardex, Intake/Output, MAR and Recent Results.

## 2017-02-23 NOTE — PROGRESS NOTES
Patient seen and examined. Doing relatively well. Has some itching. Some abdominal pain. No fever or tachycardia. Abdomen is soft and non-tender. Wound is clean.     Plan:  Allow regular diet  Stool softener  Ambulation  D/C IV dilaudid  PO percocet

## 2017-02-23 NOTE — PROGRESS NOTES
6630 - received pt in room. Pt is resting in bed. Pain rated 6/10 to abdomen. No distress noted. 3167 - assessment completed. Pt is AOx4. VSS. IV infusing. Dressing is c/d/i and LEANNE. Call bell placed at bedside. No distress noted. 1242 - observed pt's  at bedside. 1410 - observed pt family at bedside. No distress noted.

## 2017-02-24 LAB
GLUCOSE BLD STRIP.AUTO-MCNC: 104 MG/DL (ref 70–110)
GLUCOSE BLD STRIP.AUTO-MCNC: 118 MG/DL (ref 70–110)
GLUCOSE BLD STRIP.AUTO-MCNC: 165 MG/DL (ref 70–110)
GLUCOSE BLD STRIP.AUTO-MCNC: 93 MG/DL (ref 70–110)

## 2017-02-24 PROCEDURE — 74011636637 HC RX REV CODE- 636/637: Performed by: SURGERY

## 2017-02-24 PROCEDURE — 77030020255 HC SOL INJ LR 1000ML BG

## 2017-02-24 PROCEDURE — 82962 GLUCOSE BLOOD TEST: CPT

## 2017-02-24 PROCEDURE — 74011250636 HC RX REV CODE- 250/636: Performed by: SURGERY

## 2017-02-24 PROCEDURE — 65270000029 HC RM PRIVATE

## 2017-02-24 PROCEDURE — 74011250637 HC RX REV CODE- 250/637: Performed by: SURGERY

## 2017-02-24 RX ADMIN — OXYCODONE HYDROCHLORIDE AND ACETAMINOPHEN 1 TABLET: 5; 325 TABLET ORAL at 20:17

## 2017-02-24 RX ADMIN — KETOROLAC TROMETHAMINE 30 MG: 30 INJECTION, SOLUTION INTRAMUSCULAR at 14:36

## 2017-02-24 RX ADMIN — DIPHENHYDRAMINE HYDROCHLORIDE 12.5 MG: 50 INJECTION, SOLUTION INTRAMUSCULAR; INTRAVENOUS at 11:02

## 2017-02-24 RX ADMIN — KETOROLAC TROMETHAMINE 30 MG: 30 INJECTION, SOLUTION INTRAMUSCULAR at 08:31

## 2017-02-24 RX ADMIN — DOCUSATE SODIUM 100 MG: 100 CAPSULE, LIQUID FILLED ORAL at 08:31

## 2017-02-24 RX ADMIN — DIPHENHYDRAMINE HYDROCHLORIDE 12.5 MG: 50 INJECTION, SOLUTION INTRAMUSCULAR; INTRAVENOUS at 15:36

## 2017-02-24 RX ADMIN — AMLODIPINE BESYLATE 2.5 MG: 2.5 TABLET ORAL at 21:37

## 2017-02-24 RX ADMIN — DIPHENHYDRAMINE HYDROCHLORIDE 12.5 MG: 50 INJECTION, SOLUTION INTRAMUSCULAR; INTRAVENOUS at 01:57

## 2017-02-24 RX ADMIN — SODIUM CHLORIDE, SODIUM LACTATE, POTASSIUM CHLORIDE, AND CALCIUM CHLORIDE 50 ML/HR: 600; 310; 30; 20 INJECTION, SOLUTION INTRAVENOUS at 18:56

## 2017-02-24 RX ADMIN — KETOROLAC TROMETHAMINE 30 MG: 30 INJECTION, SOLUTION INTRAMUSCULAR at 03:07

## 2017-02-24 RX ADMIN — INSULIN LISPRO 2 UNITS: 100 INJECTION, SOLUTION INTRAVENOUS; SUBCUTANEOUS at 08:31

## 2017-02-24 RX ADMIN — OXYCODONE HYDROCHLORIDE AND ACETAMINOPHEN 1 TABLET: 5; 325 TABLET ORAL at 11:01

## 2017-02-24 RX ADMIN — OXYCODONE HYDROCHLORIDE AND ACETAMINOPHEN 1 TABLET: 5; 325 TABLET ORAL at 15:36

## 2017-02-24 RX ADMIN — OXYCODONE HYDROCHLORIDE AND ACETAMINOPHEN 1 TABLET: 5; 325 TABLET ORAL at 06:22

## 2017-02-24 RX ADMIN — ATORVASTATIN CALCIUM 40 MG: 40 TABLET, FILM COATED ORAL at 08:31

## 2017-02-24 RX ADMIN — DOCUSATE SODIUM 100 MG: 100 CAPSULE, LIQUID FILLED ORAL at 18:06

## 2017-02-24 RX ADMIN — DIPHENHYDRAMINE HYDROCHLORIDE 12.5 MG: 50 INJECTION, SOLUTION INTRAMUSCULAR; INTRAVENOUS at 06:22

## 2017-02-24 RX ADMIN — KETOROLAC TROMETHAMINE 30 MG: 30 INJECTION, SOLUTION INTRAMUSCULAR at 21:15

## 2017-02-24 RX ADMIN — OXYCODONE HYDROCHLORIDE AND ACETAMINOPHEN 1 TABLET: 5; 325 TABLET ORAL at 01:57

## 2017-02-24 RX ADMIN — DIPHENHYDRAMINE HYDROCHLORIDE 12.5 MG: 50 INJECTION, SOLUTION INTRAMUSCULAR; INTRAVENOUS at 20:17

## 2017-02-24 NOTE — ROUTINE PROCESS
Bedside and Verbal shift change report given to Jesse Hinkle RN (oncoming nurse) by Madison Luna RN (offgoing nurse). Report included the following information SBAR, Kardex and MAR.

## 2017-02-24 NOTE — PROGRESS NOTES
Received bedside verbal report from [facility]. Patient in bed,bed at the lowest position,call bell within reach,white board updated. 0830 Patient resting in bed,complete assessment done,looks comfortable,no complain of pain. 1100 Patient in bed,pain med given,bed at the lowest position. 1300 Patient resting in bed,no complain noted. 1430 Patient sitting in chair,watching TV,will continue to monitor her. 1630 Patient resting,families at bedside,no any complain at this moment. 1800 Patient resting in chair,call bell within reach. 1830 Patient is walking down to the hallway with nurse,NAD noted. Bedside and Verbal shift change report given to [facility] (oncoming nurse) by Jeanie Goodrich (offgoing nurse). Report included the following information SBAR, Kardex, OR Summary, Intake/Output, MAR and Recent Results.

## 2017-02-24 NOTE — PROGRESS NOTES
1930 Received shift report from Erika Siegel RN. Pt lying in bed with no complaints. Bed in lowest position, call bell within reach, will continue to monitor. 2030 Pt complains of pain, administered Torado. Pt ambulated to restroom where she voided. Bed in lowest position, call bell within reach, will continue to monitor. 2200 Pt complains of pain and being itchy, administered pain medication and Benadryl. Bed in lowest position, call bell within reach, will continue to monitor. 0200 Pt complains of pain and being itchy, administered pain medication and Benadryl. Bed in lowest position, call bell within reach, will continue to monitor. 0300 Pt complains of pain, administered pain medication. Pt ambulated to restroom where she voided. Bed in lowest position, call bell within reach, will continue to monitor. 0600 Pt complains of pain and being itchy, administered pain medication and Benadryl. Bed in lowest position, call bell within reach, will continue to monitor. Bedside and Verbal shift change report given to Lucy Arriola RN (oncoming nurse) by Giulia Mcgrath RN (offgoing nurse). Report included the following information SBAR, Kardex, Intake/Output, MAR and Recent Results.

## 2017-02-24 NOTE — PROGRESS NOTES
Patient seen and examined. Doing relatively well. However she is complaining of some abdominal pain that she describes as colic. She had one episode of tachycardia yesterday (was in pain). No fever. No tachycardia now. Had 1 episode of nausea yesterday. No vomiting. Passing a lot of flatus but no stool. Abdomen is mildly diffusely tender. Mildly distended. Wound is healing well. Plan:  Stop regular diet and place on full liquid diet  If any nausea or vomiting will place NPO  Ambulation  Keep Iv fluids  Pain management Po pain medicine. Will add IV if needed only  Continue with stool softener  Labs tomorrow. Keep Toradol. Check creatinine.

## 2017-02-25 LAB
ANION GAP BLD CALC-SCNC: 7 MMOL/L (ref 3–18)
BASOPHILS # BLD AUTO: 0 K/UL (ref 0–0.06)
BASOPHILS # BLD: 0 % (ref 0–2)
BUN SERPL-MCNC: 16 MG/DL (ref 7–18)
BUN/CREAT SERPL: 17 (ref 12–20)
CALCIUM SERPL-MCNC: 8.7 MG/DL (ref 8.5–10.1)
CHLORIDE SERPL-SCNC: 104 MMOL/L (ref 100–108)
CO2 SERPL-SCNC: 29 MMOL/L (ref 21–32)
CREAT SERPL-MCNC: 0.92 MG/DL (ref 0.6–1.3)
DIFFERENTIAL METHOD BLD: ABNORMAL
EOSINOPHIL # BLD: 0.2 K/UL (ref 0–0.4)
EOSINOPHIL NFR BLD: 3 % (ref 0–5)
ERYTHROCYTE [DISTWIDTH] IN BLOOD BY AUTOMATED COUNT: 13 % (ref 11.6–14.5)
GLUCOSE BLD STRIP.AUTO-MCNC: 110 MG/DL (ref 70–110)
GLUCOSE BLD STRIP.AUTO-MCNC: 144 MG/DL (ref 70–110)
GLUCOSE BLD STRIP.AUTO-MCNC: 77 MG/DL (ref 70–110)
GLUCOSE BLD STRIP.AUTO-MCNC: 79 MG/DL (ref 70–110)
GLUCOSE SERPL-MCNC: 175 MG/DL (ref 74–99)
HCT VFR BLD AUTO: 31.2 % (ref 35–45)
HGB BLD-MCNC: 10 G/DL (ref 12–16)
LYMPHOCYTES # BLD AUTO: 26 % (ref 21–52)
LYMPHOCYTES # BLD: 1.2 K/UL (ref 0.9–3.6)
MCH RBC QN AUTO: 28.7 PG (ref 24–34)
MCHC RBC AUTO-ENTMCNC: 32.1 G/DL (ref 31–37)
MCV RBC AUTO: 89.7 FL (ref 74–97)
MONOCYTES # BLD: 0.4 K/UL (ref 0.05–1.2)
MONOCYTES NFR BLD AUTO: 9 % (ref 3–10)
NEUTS SEG # BLD: 2.8 K/UL (ref 1.8–8)
NEUTS SEG NFR BLD AUTO: 62 % (ref 40–73)
PLATELET # BLD AUTO: 199 K/UL (ref 135–420)
PMV BLD AUTO: 10.9 FL (ref 9.2–11.8)
POTASSIUM SERPL-SCNC: 3.8 MMOL/L (ref 3.5–5.5)
RBC # BLD AUTO: 3.48 M/UL (ref 4.2–5.3)
SODIUM SERPL-SCNC: 140 MMOL/L (ref 136–145)
WBC # BLD AUTO: 4.6 K/UL (ref 4.6–13.2)

## 2017-02-25 PROCEDURE — 82962 GLUCOSE BLOOD TEST: CPT

## 2017-02-25 PROCEDURE — 74011250636 HC RX REV CODE- 250/636: Performed by: SURGERY

## 2017-02-25 PROCEDURE — 74011250637 HC RX REV CODE- 250/637: Performed by: SURGERY

## 2017-02-25 PROCEDURE — 65270000029 HC RM PRIVATE

## 2017-02-25 PROCEDURE — 77030020255 HC SOL INJ LR 1000ML BG

## 2017-02-25 PROCEDURE — 80048 BASIC METABOLIC PNL TOTAL CA: CPT | Performed by: SURGERY

## 2017-02-25 PROCEDURE — 36415 COLL VENOUS BLD VENIPUNCTURE: CPT | Performed by: SURGERY

## 2017-02-25 PROCEDURE — 85025 COMPLETE CBC W/AUTO DIFF WBC: CPT | Performed by: SURGERY

## 2017-02-25 RX ORDER — OXYCODONE AND ACETAMINOPHEN 5; 325 MG/1; MG/1
1-2 TABLET ORAL
Status: DISCONTINUED | OUTPATIENT
Start: 2017-02-25 | End: 2017-02-28 | Stop reason: HOSPADM

## 2017-02-25 RX ORDER — HYDROMORPHONE HYDROCHLORIDE 1 MG/ML
2 INJECTION, SOLUTION INTRAMUSCULAR; INTRAVENOUS; SUBCUTANEOUS ONCE
Status: COMPLETED | OUTPATIENT
Start: 2017-02-25 | End: 2017-02-25

## 2017-02-25 RX ADMIN — DIPHENHYDRAMINE HYDROCHLORIDE 12.5 MG: 50 INJECTION, SOLUTION INTRAMUSCULAR; INTRAVENOUS at 23:02

## 2017-02-25 RX ADMIN — OXYCODONE HYDROCHLORIDE AND ACETAMINOPHEN 1 TABLET: 5; 325 TABLET ORAL at 00:21

## 2017-02-25 RX ADMIN — DIPHENHYDRAMINE HYDROCHLORIDE 12.5 MG: 50 INJECTION, SOLUTION INTRAMUSCULAR; INTRAVENOUS at 00:21

## 2017-02-25 RX ADMIN — DOCUSATE SODIUM 100 MG: 100 CAPSULE, LIQUID FILLED ORAL at 08:39

## 2017-02-25 RX ADMIN — OXYCODONE HYDROCHLORIDE AND ACETAMINOPHEN 1 TABLET: 5; 325 TABLET ORAL at 04:23

## 2017-02-25 RX ADMIN — ATORVASTATIN CALCIUM 40 MG: 40 TABLET, FILM COATED ORAL at 08:39

## 2017-02-25 RX ADMIN — OXYCODONE HYDROCHLORIDE AND ACETAMINOPHEN 2 TABLET: 5; 325 TABLET ORAL at 21:58

## 2017-02-25 RX ADMIN — AMLODIPINE BESYLATE 2.5 MG: 2.5 TABLET ORAL at 21:33

## 2017-02-25 RX ADMIN — DOCUSATE SODIUM 100 MG: 100 CAPSULE, LIQUID FILLED ORAL at 19:10

## 2017-02-25 RX ADMIN — DIPHENHYDRAMINE HYDROCHLORIDE 12.5 MG: 50 INJECTION, SOLUTION INTRAMUSCULAR; INTRAVENOUS at 04:23

## 2017-02-25 RX ADMIN — KETOROLAC TROMETHAMINE 30 MG: 30 INJECTION, SOLUTION INTRAMUSCULAR at 16:07

## 2017-02-25 RX ADMIN — HYDROMORPHONE HYDROCHLORIDE 2 MG: 1 INJECTION, SOLUTION INTRAMUSCULAR; INTRAVENOUS; SUBCUTANEOUS at 18:01

## 2017-02-25 RX ADMIN — OXYCODONE HYDROCHLORIDE AND ACETAMINOPHEN 1 TABLET: 5; 325 TABLET ORAL at 15:06

## 2017-02-25 RX ADMIN — DIPHENHYDRAMINE HYDROCHLORIDE 12.5 MG: 50 INJECTION, SOLUTION INTRAMUSCULAR; INTRAVENOUS at 09:52

## 2017-02-25 RX ADMIN — SODIUM CHLORIDE, SODIUM LACTATE, POTASSIUM CHLORIDE, AND CALCIUM CHLORIDE 50 ML/HR: 600; 310; 30; 20 INJECTION, SOLUTION INTRAVENOUS at 15:08

## 2017-02-25 RX ADMIN — KETOROLAC TROMETHAMINE 30 MG: 30 INJECTION, SOLUTION INTRAMUSCULAR at 08:39

## 2017-02-25 RX ADMIN — OXYCODONE HYDROCHLORIDE AND ACETAMINOPHEN 1 TABLET: 5; 325 TABLET ORAL at 09:53

## 2017-02-25 RX ADMIN — KETOROLAC TROMETHAMINE 30 MG: 30 INJECTION, SOLUTION INTRAMUSCULAR at 20:40

## 2017-02-25 RX ADMIN — DIPHENHYDRAMINE HYDROCHLORIDE 12.5 MG: 50 INJECTION, SOLUTION INTRAMUSCULAR; INTRAVENOUS at 19:10

## 2017-02-25 RX ADMIN — KETOROLAC TROMETHAMINE 30 MG: 30 INJECTION, SOLUTION INTRAMUSCULAR at 02:55

## 2017-02-25 RX ADMIN — DIPHENHYDRAMINE HYDROCHLORIDE 12.5 MG: 50 INJECTION, SOLUTION INTRAMUSCULAR; INTRAVENOUS at 15:05

## 2017-02-25 NOTE — PROGRESS NOTES
Surgery    Pt reports no more nausea. Hungry. Active flatus. No BM. Walking well. Pain well controlled    Visit Vitals    BP (!) 157/94 (BP 1 Location: Right arm, BP Patient Position: At rest)    Pulse 77    Temp 97.8 °F (36.6 °C)    Resp 16    Ht 5' 4\" (1.626 m)    Wt 70.3 kg (155 lb)    SpO2 100%    BMI 26.61 kg/m2       Date 02/24/17 0700 - 02/25/17 0659 02/25/17 0700 - 02/26/17 0659   Shift 5334-4144 5319-6342 24 Hour Total 1794-9506 7174-9912 24 Hour Total   I  N  T  A  K  E   P.O. 1416 773 0470 360  360      P. O. 9823 543 6085 360  360    I.V.  (mL/kg/hr)  599.2  (0.7) 599.2  (0.4)         Volume (lactated ringers infusion)  599.2 599.2       Shift Total  (mL/kg) 1010  (14.4) 919.2  (13.1) 1929.2  (27.4) 360  (5.1)  360  (5.1)   O  U  T  P  U  T   Urine  (mL/kg/hr) 700  (0.8) 250  (0.3) 950  (0.6) 500  500      Urine Voided 700 250 950 500  500    Shift Total  (mL/kg) 700  (10) 250  (3.6) 950  (13.5) 500  (7.1)  500  (7.1)    669.2 979.2 -140  -140   Weight (kg) 70.3 70.3 70.3 70.3 70.3 70.3     Abd: wound clean and intact, soft, ND, appr TTP, active BS    Recent Results (from the past 12 hour(s))   CBC WITH AUTOMATED DIFF    Collection Time: 02/25/17  3:30 AM   Result Value Ref Range    WBC 4.6 4.6 - 13.2 K/uL    RBC 3.48 (L) 4.20 - 5.30 M/uL    HGB 10.0 (L) 12.0 - 16.0 g/dL    HCT 31.2 (L) 35.0 - 45.0 %    MCV 89.7 74.0 - 97.0 FL    MCH 28.7 24.0 - 34.0 PG    MCHC 32.1 31.0 - 37.0 g/dL    RDW 13.0 11.6 - 14.5 %    PLATELET 006 599 - 028 K/uL    MPV 10.9 9.2 - 11.8 FL    NEUTROPHILS 62 40 - 73 %    LYMPHOCYTES 26 21 - 52 %    MONOCYTES 9 3 - 10 %    EOSINOPHILS 3 0 - 5 %    BASOPHILS 0 0 - 2 %    ABS. NEUTROPHILS 2.8 1.8 - 8.0 K/UL    ABS. LYMPHOCYTES 1.2 0.9 - 3.6 K/UL    ABS. MONOCYTES 0.4 0.05 - 1.2 K/UL    ABS. EOSINOPHILS 0.2 0.0 - 0.4 K/UL    ABS.  BASOPHILS 0.0 0.0 - 0.06 K/UL    DF AUTOMATED     METABOLIC PANEL, BASIC    Collection Time: 02/25/17  3:30 AM   Result Value Ref Range Sodium 140 136 - 145 mmol/L    Potassium 3.8 3.5 - 5.5 mmol/L    Chloride 104 100 - 108 mmol/L    CO2 29 21 - 32 mmol/L    Anion gap 7 3.0 - 18 mmol/L    Glucose 175 (H) 74 - 99 mg/dL    BUN 16 7.0 - 18 MG/DL    Creatinine 0.92 0.6 - 1.3 MG/DL    BUN/Creatinine ratio 17 12 - 20      GFR est AA >60 >60 ml/min/1.73m2    GFR est non-AA >60 >60 ml/min/1.73m2    Calcium 8.7 8.5 - 10.1 MG/DL   GLUCOSE, POC    Collection Time: 02/25/17  5:58 AM   Result Value Ref Range    Glucose (POC) 110 70 - 110 mg/dL     Imp: POD 3 SB resect with RANJITH    Looks good overall.      Will try solids again    Continue to walk    Await full resolution of post-op ileus

## 2017-02-25 NOTE — PROGRESS NOTES
1615 took BS results 77, toradol given, patient in bed, call bell within reach, no distress noted, no nausea, no vomiting noted    1736 paiged MD Saba Barker in regards to patient c/o pain, awaiting call back     1742 orders received for dilaudid one time dose 2mg, percocets 1-2 tabs    Bedside and Verbal shift change report given to Koby Cruz  (oncoming nurse) by Linda Coburn RN  (offgoing nurse). Report included the following information SBAR, Kardex and MAR.

## 2017-02-25 NOTE — PROGRESS NOTES
1910 Received shift report from [facility]. Pt lying in bed with  at bedside with no complaints of pain. Bed in lowest position, call bell within reach, will continue to monitor. 2015 Pt complains of pain and itching, administered pain medication and Benadryl. Pt ambulated to restroom where she voided. Bed in lowest position, call bell within reach, will continue to monitor. 2115 Pt complains of pain, administered Toradol. Bed in lowest position, call bell within reach, will continue to monitor. 0030 Pt complains of pain and itching, administered pain medication and Benadryl. Pt ambulated to restroom where she voided. Bed in lowest position, call bell within reach, will continue to monitor. 0415 Pt complains of pain and itching, administered pain medication and Benadryl. Bed in lowest position, call bell within reach, will continue to monitor. Bedside and Verbal shift change report given to Christina Edmonds RN (oncoming nurse) by Remy Britton RN (offgoing nurse). Report included the following information SBAR, Kardex, Intake/Output, MAR and Recent Results.

## 2017-02-26 LAB
GLUCOSE BLD STRIP.AUTO-MCNC: 106 MG/DL (ref 70–110)
GLUCOSE BLD STRIP.AUTO-MCNC: 128 MG/DL (ref 70–110)
GLUCOSE BLD STRIP.AUTO-MCNC: 130 MG/DL (ref 70–110)
GLUCOSE BLD STRIP.AUTO-MCNC: 88 MG/DL (ref 70–110)

## 2017-02-26 PROCEDURE — 74011250637 HC RX REV CODE- 250/637: Performed by: SURGERY

## 2017-02-26 PROCEDURE — 65270000029 HC RM PRIVATE

## 2017-02-26 PROCEDURE — 74011250636 HC RX REV CODE- 250/636: Performed by: SURGERY

## 2017-02-26 PROCEDURE — 77030020255 HC SOL INJ LR 1000ML BG

## 2017-02-26 PROCEDURE — 82962 GLUCOSE BLOOD TEST: CPT

## 2017-02-26 RX ADMIN — DIPHENHYDRAMINE HYDROCHLORIDE 12.5 MG: 50 INJECTION, SOLUTION INTRAMUSCULAR; INTRAVENOUS at 02:47

## 2017-02-26 RX ADMIN — OXYCODONE HYDROCHLORIDE AND ACETAMINOPHEN 2 TABLET: 5; 325 TABLET ORAL at 22:14

## 2017-02-26 RX ADMIN — KETOROLAC TROMETHAMINE 30 MG: 30 INJECTION, SOLUTION INTRAMUSCULAR at 09:40

## 2017-02-26 RX ADMIN — DIPHENHYDRAMINE HYDROCHLORIDE 12.5 MG: 50 INJECTION, SOLUTION INTRAMUSCULAR; INTRAVENOUS at 18:18

## 2017-02-26 RX ADMIN — DIPHENHYDRAMINE HYDROCHLORIDE 12.5 MG: 50 INJECTION, SOLUTION INTRAMUSCULAR; INTRAVENOUS at 13:20

## 2017-02-26 RX ADMIN — OXYCODONE HYDROCHLORIDE AND ACETAMINOPHEN 2 TABLET: 5; 325 TABLET ORAL at 18:18

## 2017-02-26 RX ADMIN — OXYCODONE HYDROCHLORIDE AND ACETAMINOPHEN 2 TABLET: 5; 325 TABLET ORAL at 13:20

## 2017-02-26 RX ADMIN — ATORVASTATIN CALCIUM 40 MG: 40 TABLET, FILM COATED ORAL at 09:41

## 2017-02-26 RX ADMIN — DOCUSATE SODIUM 100 MG: 100 CAPSULE, LIQUID FILLED ORAL at 18:18

## 2017-02-26 RX ADMIN — SODIUM CHLORIDE, SODIUM LACTATE, POTASSIUM CHLORIDE, AND CALCIUM CHLORIDE 50 ML/HR: 600; 310; 30; 20 INJECTION, SOLUTION INTRAVENOUS at 13:28

## 2017-02-26 RX ADMIN — DIPHENHYDRAMINE HYDROCHLORIDE 12.5 MG: 50 INJECTION, SOLUTION INTRAMUSCULAR; INTRAVENOUS at 22:13

## 2017-02-26 RX ADMIN — OXYCODONE HYDROCHLORIDE AND ACETAMINOPHEN 2 TABLET: 5; 325 TABLET ORAL at 02:23

## 2017-02-26 RX ADMIN — OXYCODONE HYDROCHLORIDE AND ACETAMINOPHEN 2 TABLET: 5; 325 TABLET ORAL at 06:44

## 2017-02-26 RX ADMIN — KETOROLAC TROMETHAMINE 30 MG: 30 INJECTION, SOLUTION INTRAMUSCULAR at 02:23

## 2017-02-26 RX ADMIN — DIPHENHYDRAMINE HYDROCHLORIDE 12.5 MG: 50 INJECTION, SOLUTION INTRAMUSCULAR; INTRAVENOUS at 06:44

## 2017-02-26 RX ADMIN — DOCUSATE SODIUM 100 MG: 100 CAPSULE, LIQUID FILLED ORAL at 09:41

## 2017-02-26 RX ADMIN — KETOROLAC TROMETHAMINE 30 MG: 30 INJECTION, SOLUTION INTRAMUSCULAR at 16:53

## 2017-02-26 RX ADMIN — AMLODIPINE BESYLATE 2.5 MG: 2.5 TABLET ORAL at 21:42

## 2017-02-26 NOTE — ROUTINE PROCESS
Bedside and Verbal shift change report given to Carol Daly 227 (oncoming nurse) by Mingo Tay RN   (offgoing nurse). Report included the following information SBAR, Kardex and MAR.

## 2017-02-27 LAB
GLUCOSE BLD STRIP.AUTO-MCNC: 126 MG/DL (ref 70–110)
GLUCOSE BLD STRIP.AUTO-MCNC: 154 MG/DL (ref 70–110)
GLUCOSE BLD STRIP.AUTO-MCNC: 169 MG/DL (ref 70–110)
GLUCOSE BLD STRIP.AUTO-MCNC: 93 MG/DL (ref 70–110)
GLUCOSE BLD STRIP.AUTO-MCNC: 99 MG/DL (ref 70–110)

## 2017-02-27 PROCEDURE — 74011636637 HC RX REV CODE- 636/637: Performed by: SURGERY

## 2017-02-27 PROCEDURE — 82962 GLUCOSE BLOOD TEST: CPT

## 2017-02-27 PROCEDURE — 77030020255 HC SOL INJ LR 1000ML BG

## 2017-02-27 PROCEDURE — 74011250637 HC RX REV CODE- 250/637: Performed by: SURGERY

## 2017-02-27 PROCEDURE — 65270000029 HC RM PRIVATE

## 2017-02-27 PROCEDURE — 74011250636 HC RX REV CODE- 250/636: Performed by: SURGERY

## 2017-02-27 RX ORDER — FACIAL-BODY WIPES
10 EACH TOPICAL ONCE
Status: COMPLETED | OUTPATIENT
Start: 2017-02-27 | End: 2017-02-27

## 2017-02-27 RX ORDER — DIPHENHYDRAMINE HCL 25 MG
25 CAPSULE ORAL
Status: DISCONTINUED | OUTPATIENT
Start: 2017-02-27 | End: 2017-02-28 | Stop reason: HOSPADM

## 2017-02-27 RX ORDER — POLYETHYLENE GLYCOL 3350 17 G/17G
17 POWDER, FOR SOLUTION ORAL ONCE
Status: COMPLETED | OUTPATIENT
Start: 2017-02-27 | End: 2017-02-27

## 2017-02-27 RX ADMIN — OXYCODONE HYDROCHLORIDE AND ACETAMINOPHEN 2 TABLET: 5; 325 TABLET ORAL at 02:26

## 2017-02-27 RX ADMIN — AMLODIPINE BESYLATE 2.5 MG: 2.5 TABLET ORAL at 22:15

## 2017-02-27 RX ADMIN — DIPHENHYDRAMINE HYDROCHLORIDE 25 MG: 25 CAPSULE ORAL at 14:51

## 2017-02-27 RX ADMIN — DIPHENHYDRAMINE HYDROCHLORIDE 12.5 MG: 50 INJECTION, SOLUTION INTRAMUSCULAR; INTRAVENOUS at 02:27

## 2017-02-27 RX ADMIN — OXYCODONE HYDROCHLORIDE AND ACETAMINOPHEN 2 TABLET: 5; 325 TABLET ORAL at 10:35

## 2017-02-27 RX ADMIN — OXYCODONE HYDROCHLORIDE AND ACETAMINOPHEN 2 TABLET: 5; 325 TABLET ORAL at 18:58

## 2017-02-27 RX ADMIN — OXYCODONE HYDROCHLORIDE AND ACETAMINOPHEN 2 TABLET: 5; 325 TABLET ORAL at 14:51

## 2017-02-27 RX ADMIN — DIPHENHYDRAMINE HYDROCHLORIDE 12.5 MG: 50 INJECTION, SOLUTION INTRAMUSCULAR; INTRAVENOUS at 10:34

## 2017-02-27 RX ADMIN — DOCUSATE SODIUM 100 MG: 100 CAPSULE, LIQUID FILLED ORAL at 08:28

## 2017-02-27 RX ADMIN — DIPHENHYDRAMINE HYDROCHLORIDE 12.5 MG: 50 INJECTION, SOLUTION INTRAMUSCULAR; INTRAVENOUS at 06:31

## 2017-02-27 RX ADMIN — OXYCODONE HYDROCHLORIDE AND ACETAMINOPHEN 2 TABLET: 5; 325 TABLET ORAL at 23:12

## 2017-02-27 RX ADMIN — POLYETHYLENE GLYCOL 3350 17 G: 17 POWDER, FOR SOLUTION ORAL at 08:28

## 2017-02-27 RX ADMIN — SODIUM CHLORIDE, SODIUM LACTATE, POTASSIUM CHLORIDE, AND CALCIUM CHLORIDE 50 ML/HR: 600; 310; 30; 20 INJECTION, SOLUTION INTRAVENOUS at 10:34

## 2017-02-27 RX ADMIN — INSULIN LISPRO 2 UNITS: 100 INJECTION, SOLUTION INTRAVENOUS; SUBCUTANEOUS at 08:28

## 2017-02-27 RX ADMIN — ATORVASTATIN CALCIUM 40 MG: 40 TABLET, FILM COATED ORAL at 08:28

## 2017-02-27 RX ADMIN — INSULIN LISPRO 2 UNITS: 100 INJECTION, SOLUTION INTRAVENOUS; SUBCUTANEOUS at 22:21

## 2017-02-27 RX ADMIN — DOCUSATE SODIUM 100 MG: 100 CAPSULE, LIQUID FILLED ORAL at 18:58

## 2017-02-27 RX ADMIN — DIPHENHYDRAMINE HYDROCHLORIDE 25 MG: 25 CAPSULE ORAL at 18:58

## 2017-02-27 RX ADMIN — OXYCODONE HYDROCHLORIDE AND ACETAMINOPHEN 2 TABLET: 5; 325 TABLET ORAL at 06:30

## 2017-02-27 RX ADMIN — BISACODYL 10 MG: 10 SUPPOSITORY RECTAL at 10:35

## 2017-02-27 RX ADMIN — DIPHENHYDRAMINE HYDROCHLORIDE 25 MG: 25 CAPSULE ORAL at 23:12

## 2017-02-27 NOTE — PROGRESS NOTES
Received bedside shift report from Malik Wilkerson RN. Pt resting in bed, white board updated, call bell within reach. 0911 Pt walking in hallways, NAD noted. 1040 Suppository administered, pt tolerated well. 1311 Pt attempted to have bowel movement, only small amount of mucus came out. Pt states that she is still having cramping. 1400 Alerted by staff that IV might be bad. Assessed, pt stated that it was sore. Called Dr. Megha Sahu, received telephone orders to discontinue IV fluids, change benadryl to 25mg PO Q4H for itching, and it is ok to not have an IV in place. 0 Pt ambulated again in hallways, but still no bowel movement. Bedside shift change report given to Janice Paul RN (oncoming nurse) by Phil Gutierrez RN (offgoing nurse). Report included the following information SBAR, Kardex, OR Summary, Intake/Output, MAR and Recent Results.

## 2017-02-27 NOTE — ROUTINE PROCESS
Bedside and Verbal shift change report given to Hamilton Ferrell RN (oncoming nurse) by Phil Bowser RN   (offgoing nurse). Report included the following information SBAR, Kardex and MAR.

## 2017-02-27 NOTE — PROGRESS NOTES
NUTRITION follow-up/Plan of care    RECOMMENDATIONS:   1. Regular diet  2. Monitor weight and PO intake  3. RD to follow    GOALS:   1. Ongoing: PO intake meets >75% of protein/calorie needs by 3/6  2. Ongoing: Maintain weight (+/- 1-2 lb) by 3/6    ASSESSMENT:   Per BMI of 26.6, weight is in the overweight classification. PO intake is improving. Labs noted. Nutrition recommendations listed. RD to follow. Nutrition Risk:  []   High [x]  Moderate [] Low    SUBJECTIVE/OBJECTIVE:   Pt admitted for right lower quadrant abdominal pain and chronic SBO. She had a small bowel resection on 2/21 and extensive lysis of adhesions. She has h/o of diabetes but she eats a regular diet. Pt states that she ate a night breakfast and wants to wait some more before eating her lunch tray. She denies N/V.      Information Obtained From:   [x] Chart Review  [x] Patient  [x] Family/Caregiver  [] Nurse/Physician   [] Patient Rounds/Interdisciplinary Meeting    Diet: Regular  Patient Vitals for the past 100 hrs:   % Diet Eaten   02/26/17 1400 80 %   02/26/17 0945 50 %   02/25/17 1616 50 %   02/25/17 0842 50 %   02/24/17 1700 70 %   02/24/17 0830 80 %     Medications: [x] Reviewed (Colace, Lispro, LR: 50 ml/hr)  Past Medical History:   Diagnosis Date    Abdominal adhesions     Anemia     Arthritis     all over the body    Asthma     Asthma     Diabetes ([facility] [address]     Diabetes mellitus     Dyskinesia     bilateral    Essential hypertension     GERD (gastroesophageal reflux disease)     Hypertension     Menopause     Stool color black      Labs:  Lab Results   Component Value Date/Time    Sodium 140 02/25/2017 03:30 AM    Potassium 3.8 02/25/2017 03:30 AM    Chloride 104 02/25/2017 03:30 AM    CO2 29 02/25/2017 03:30 AM    Anion gap 7 02/25/2017 03:30 AM    Glucose 175 02/25/2017 03:30 AM    BUN 16 02/25/2017 03:30 AM    Creatinine 0.92 02/25/2017 03:30 AM    Calcium 8.7 02/25/2017 03:30 AM    Magnesium 2.5 12/04/2016 06:02 PM Albumin 3.7 01/22/2017 02:00 PM     Anthropometrics: BMI (calculated): 26.6 Last 3 Recorded Weights in this Encounter    02/16/17 0915 02/21/17 1304   Weight: 71.2 kg (157 lb) 70.3 kg (155 lb)    Ht Readings from Last 1 Encounters:   02/21/17 5' 4\" (1.626 m)     []  Weight Loss  []  Weight Gain  []  Weight Stable   [x]  New wt n/a on record     Estimated Nutrition Needs:   1800 Kcals/day  Protein (g): 75 g    Nutrition Problems Identified:   [] Suboptimal PO intake   [] Food Allergies  [] Difficulty chewing/swallowing/poor dentition  [] Constipation/Diarrhea   [] Nausea/Vomiting   [x] None  [] Other:     Plan:   [] Therapeutic Diet  []  Obtained/adjusted food preferences/tolerances and/or snacks options   []  Supplements added   [] Occupational therapy following for feeding techniques  []  HS snack added   []  Modify diet texture   []  Modify diet for food allergies   []  Educate patient   []  Assist with menu selection   [x]  Monitor PO intake on meal rounds   [x]  Continue inpatient monitoring and intervention   []  Participated in discharge planning/Interdisciplinary rounds/Team meetings   []  Other:     Education Needs:   [] Not appropriate for teaching at this time due to:   [x] Identified and addressed    Nutrition Monitoring and Evaluation:   [x] Continue inpatient monitoring and interventions    [] Other:     Lona Warren, [address] [place] [address]  Pager: [phone]

## 2017-02-27 NOTE — PROGRESS NOTES
Pt complained upper part of incision hurts due to staples getting caught or rubbing on her gown. 4X4 placed on upper part of incision over staples. Pt states it feels better.

## 2017-02-28 VITALS
BODY MASS INDEX: 26.46 KG/M2 | DIASTOLIC BLOOD PRESSURE: 91 MMHG | SYSTOLIC BLOOD PRESSURE: 143 MMHG | OXYGEN SATURATION: 97 % | HEIGHT: 64 IN | TEMPERATURE: 97.9 F | WEIGHT: 155 LBS | RESPIRATION RATE: 17 BRPM | HEART RATE: 89 BPM

## 2017-02-28 LAB — GLUCOSE BLD STRIP.AUTO-MCNC: 112 MG/DL (ref 70–110)

## 2017-02-28 PROCEDURE — 74011250637 HC RX REV CODE- 250/637: Performed by: SURGERY

## 2017-02-28 PROCEDURE — 82962 GLUCOSE BLOOD TEST: CPT

## 2017-02-28 RX ADMIN — OXYCODONE HYDROCHLORIDE AND ACETAMINOPHEN 2 TABLET: 5; 325 TABLET ORAL at 07:16

## 2017-02-28 RX ADMIN — DIPHENHYDRAMINE HYDROCHLORIDE 25 MG: 25 CAPSULE ORAL at 03:08

## 2017-02-28 RX ADMIN — DOCUSATE SODIUM 100 MG: 100 CAPSULE, LIQUID FILLED ORAL at 09:19

## 2017-02-28 RX ADMIN — OXYCODONE HYDROCHLORIDE AND ACETAMINOPHEN 2 TABLET: 5; 325 TABLET ORAL at 10:57

## 2017-02-28 RX ADMIN — DIPHENHYDRAMINE HYDROCHLORIDE 25 MG: 25 CAPSULE ORAL at 07:16

## 2017-02-28 RX ADMIN — ATORVASTATIN CALCIUM 40 MG: 40 TABLET, FILM COATED ORAL at 09:19

## 2017-02-28 RX ADMIN — OXYCODONE HYDROCHLORIDE AND ACETAMINOPHEN 2 TABLET: 5; 325 TABLET ORAL at 03:08

## 2017-02-28 NOTE — PROGRESS NOTES
Patient seen and examined. (Note written today for yesterday exam). Doing relatively well. Passinf flatus but no stool. Still some cramping. Abdomen is soft. Wound is healing well.   Plan:  Possible discharge home tomorrow  Ambulation  Stool softener

## 2017-02-28 NOTE — DISCHARGE INSTRUCTIONS
DISCHARGE SUMMARY from Nurse    The following personal items are in your possession at time of discharge:    Dental Appliances: None  Visual Aid: Glasses, With patient        Jewelry: None  Clothing: Footwear, Undergarments, Shirt, Pants  Other Valuables: None  Personal Items Sent to Safe: family          PATIENT INSTRUCTIONS:    After general anesthesia or intravenous sedation, for 24 hours or while taking prescription Narcotics:  · Limit your activities  · Do not drive and operate hazardous machinery  · Do not make important personal or business decisions  · Do  not drink alcoholic beverages  · If you have not urinated within 8 hours after discharge, please contact your surgeon on call. Report the following to your surgeon:  · Excessive pain, swelling, redness or odor of or around the surgical area  · Temperature over 100.5  · Nausea and vomiting lasting longer than 4 hours or if unable to take medications  · Any signs of decreased circulation or nerve impairment to extremity: change in color, persistent  numbness, tingling, coldness or increase pain  · Any questions        What to do at Home:  Recommended activity: Activity as tolerated, no heavy lifting, no driving    If you experience any of the following symptoms elevated temperature notify doctor    myalgias     Patient armband removed and shreddedDischarge medications reviewed with the patient and appropriate educational materials and side effects teaching were provided. *  Please give a list of your current medications to your Primary Care Provider. *  Please update this list whenever your medications are discontinued, doses are      changed, or new medications (including over-the-counter products) are added. *  Please carry medication information at all times in case of emergency situations.           These are general instructions for a healthy lifestyle:    No smoking/ No tobacco products/ Avoid exposure to second hand smoke    Surgeon General's Warning:  Quitting smoking now greatly reduces serious risk to your health. Obesity, smoking, and sedentary lifestyle greatly increases your risk for illness    A healthy diet, regular physical exercise & weight monitoring are important for maintaining a healthy lifestyle    You may be retaining fluid if you have a history of heart failure or if you experience any of the following symptoms:  Weight gain of 3 pounds or more overnight or 5 pounds in a week, increased swelling in our hands or feet or shortness of breath while lying flat in bed. Please call your doctor as soon as you notice any of these symptoms; do not wait until your next office visit. Recognize signs and symptoms of STROKE:    F-face looks uneven    A-arms unable to move or move unevenly    S-speech slurred or non-existent    T-time-call 911 as soon as signs and symptoms begin-DO NOT go       Back to bed or wait to see if you get better-TIME IS BRAIN. Warning Signs of HEART ATTACK     Call 911 if you have these symptoms:   Chest discomfort. Most heart attacks involve discomfort in the center of the chest that lasts more than a few minutes, or that goes away and comes back. It can feel like uncomfortable pressure, squeezing, fullness, or pain.  Discomfort in other areas of the upper body. Symptoms can include pain or discomfort in one or both arms, the back, neck, jaw, or stomach.  Shortness of breath with or without chest discomfort.  Other signs may include breaking out in a cold sweat, nausea, or lightheadedness. Don't wait more than five minutes to call 911 - MINUTES MATTER! Fast action can save your life. Calling 911 is almost always the fastest way to get lifesaving treatment. Emergency Medical Services staff can begin treatment when they arrive -- up to an hour sooner than if someone gets to the hospital by car. The discharge information has been reviewed with the patient.   The patient verbalized understanding. Discharge medications reviewed with the patient and appropriate educational materials and side effects teaching were provided. Instructions Following Surgery    Patient: Koleen Simmonds MRN: [id number]  SSN: [id number]    YOB: 1960  Age: 62 y.o. Sex: female      Activity  · As tolerated, walking encourage, stairs are okay  · Avoid strenuous activities - no lifting anything heavier than 15 pounds. · You may shower at home after 48 hours. Diet  · Regular diet    Pain  · Take pain medication as directed by your doctor  ·  Call your doctor if pain is NOT relieved by medication    Call your doctor if  · Excessive bleeding that does not stop after holding mild pressure over the area  · Temperature of 101 degrees F or above  · Redness,excessive swelling or bruising, and/or green or yellow, smelly discharge from incision  · If nausea and vomiting continues    Follow-Up Phone Calls  · Call the office at [phone] to make your follow-up appointment    Appointment date/time: 2 weeks after the surgery. Dr. Lulu Ag cell phone number is [phone]. Please call me if you have any concerns or questions.

## 2017-02-28 NOTE — PROGRESS NOTES
Patient seen and examined. Doing relatively well but she said she did not pass stool yet and she is very concerned. She is passing flatus (multiple times). No nausea or vomiting. And tolerating diet. Her cramps are less. No fever or tachycardia. Abdomen is soft, non-tender. Wound is healing well. Plan:  Discharge home today  I reassured the patient that it is safe to be discharged today despite that no bowel movement yet.    Follow up in 2 weeks

## 2017-02-28 NOTE — PROGRESS NOTES
1905 Received shift report from Camille Vasquez RN. Pt lying in bed with no complaints. 2045 Pt upset about her being unable to have a BM. Educated pt on importance of ambulation. Pt has some edema in the lower extremities, abdomen is distended and taught. Pt complains of pain, but declines anything at this time. Pt does not want to wear SCDs, explained their purpose, but pt is up ad tammy and walking frequently. Bed in lowest position, call bell within reach, will continue to monitor. 2315 Pt complains of pain and itching, administered Percocet & Benadryl. Pt ambulated to restroom where she voided. Bed in lowest position, call bell within reach, will continue to monitor. 0315 Pt complains of pain and itching, administered Percocet & Benadryl. Pt ambulated to restroom where she voided. Pt ambulating in room, pacing back and forth and then ambulated down hallway. Pt states cramping will not subside, Percocet helps, but pain is still unbearable. Bed in lowest position, call bell within reach, will continue to monitor. 0600 Pt lying in bed with no complaints. Bed in lowest position, call bell within reach, will continue to monitor. Bedside and Verbal shift change report given to Imelda (oncoming nurse) by Rashel Rivero RN (offgoing nurse). Report included the following information SBAR, Kardex, Intake/Output, MAR and Recent Results.

## 2017-02-28 NOTE — PROGRESS NOTES
Care Management Interventions  PCP Verified by CM: Yes  Palliative Care Consult (Criteria: CHF and RRAT>21): No  Reason for No Palliative Care Consult: Patient declined palliative services at this time  Mode of Transport at Discharge: Other (see comment)  Transition of Care Consult (CM Consult):  Other  MyChart Signup: No  Discharge Durable Medical Equipment: No  Physical Therapy Consult: No  Occupational Therapy Consult: No  Speech Therapy Consult: No  Current Support Network: Lives with Spouse, Own Home  Confirm Follow Up Transport: Family  Plan discussed with Pt/Family/Caregiver: Yes  Freedom of Choice Offered: Yes  Discharge Location  Discharge Placement: Home

## 2017-02-28 NOTE — PROGRESS NOTES
Received report from [facility] regarding patients, paient complaining of pain and discomfort, patient received Percocet and Benadryl from previous nurse, will continue to monitor patient. Patient has orders for discharges, will educate patient regarding discharge summary.

## 2017-03-01 NOTE — DISCHARGE SUMMARY
.  General Surgery Discharge Note    Admission Date: 2/21/2017    Discharge Date: 2/28/2017      Admission Diagnosis:  Small bowel obstruction    Discharge Diagnosis:  Small bowel obstruction    Procedures:   Exploratory laparotomy, Small bowel resection, Lysis of adhesions. Postop Complications: ileus    Hospital Course:  Patient was admitted on 2/21/2017 for elective laparotomy for small bowel obstruction. Operation was without significant complication. Patient admitted to the floor postoperatively, monitored as per protocol. Diet sequentially advanced beginning POD 2, pain medications transitioned to oral during the hospital course. Patient than developed mild ileus and had to be placed  On clear liquid diet. At the time of discharge the patient is afebrile, vital signs stable,  tolerating a diet, voiding spontaneously, ambulatory with adequate pain control with oral medications and clear surgical sites without evidence of infection. Discharge Diet:  regular Diet    Discharge Medications:      No current facility-administered medications for this encounter. Current Outpatient Prescriptions   Medication Sig    dapagliflozin (FARXIGA) 10 mg tab Take  by mouth daily. Indications: type 2 diabetes mellitus    bisoprolol-hydrochlorothiazide (ZIAC) 2.5-6.25 mg per tablet Take 1 Tab by mouth daily.  doxepin (SINEQUAN) 10 mg capsule Take  by mouth nightly.  docusate sodium (COLACE) 100 mg capsule Take 1 Cap by mouth two (2) times a day for 90 days. (Patient taking differently: Take 100 mg by mouth as needed.)    ondansetron hcl (ZOFRAN, AS HYDROCHLORIDE,) 4 mg tablet Take 1 Tab by mouth every eight (8) hours as needed for Nausea.  polyethylene glycol (MIRALAX) 17 gram/dose powder Take 17 g by mouth daily. 1 tablespoon with 8 oz of water daily (Patient taking differently: Take 17 g by mouth as needed.  1 tablespoon with 8 oz of water daily)    amLODIPine (NORVASC) 2.5 mg tablet Take 2.5 mg by mouth nightly.  atorvastatin (LIPITOR) 20 mg tablet Take 40 mg by mouth daily.  omeprazole (PRILOSEC) 10 mg capsule Take 10 mg by mouth daily.  albuterol (PROVENTIL HFA, VENTOLIN HFA, PROAIR HFA) 90 mcg/actuation inhaler Take 1 Puff by inhalation every six (6) hours as needed for Wheezing.  metFORMIN (GLUMETZA) 500 mg TG24 24 hour tablet Take 500 mg by mouth two (2) times a day. Local wound care with daily showers, keep wounds clean and dry    Activity: as desired, no lifting greater than 15lbs or situps for 30 days    Special Instructions:   No driving until activity is not influenced by incisional pain and off narcotics   No bath or hot tub until wounds are healed   Notify [facility] for a fever>101, redness or foul-smelling  drainage from incision, or worsening pain. Followup with surgeon in 10-14 days.

## 2017-03-02 ENCOUNTER — APPOINTMENT (OUTPATIENT)
Dept: CT IMAGING | Age: 57
DRG: 392 | End: 2017-03-02
Attending: NURSE PRACTITIONER
Payer: COMMERCIAL

## 2017-03-02 ENCOUNTER — HOSPITAL ENCOUNTER (INPATIENT)
Age: 57
LOS: 2 days | Discharge: HOME | DRG: 392 | End: 2017-03-04
Attending: EMERGENCY MEDICINE | Admitting: SURGERY
Payer: COMMERCIAL

## 2017-03-02 DIAGNOSIS — N73.9 PELVIC ABSCESS IN FEMALE: ICD-10-CM

## 2017-03-02 DIAGNOSIS — R10.31 ABDOMINAL PAIN, RIGHT LOWER QUADRANT: Primary | ICD-10-CM

## 2017-03-02 LAB
ALBUMIN SERPL BCP-MCNC: 3.6 G/DL (ref 3.4–5)
ALBUMIN/GLOB SERPL: 0.8 {RATIO} (ref 0.8–1.7)
ALP SERPL-CCNC: 184 U/L (ref 45–117)
ALT SERPL-CCNC: 30 U/L (ref 13–56)
ANION GAP BLD CALC-SCNC: 10 MMOL/L (ref 3–18)
APPEARANCE UR: CLEAR
AST SERPL W P-5'-P-CCNC: 34 U/L (ref 15–37)
BACTERIA URNS QL MICRO: NEGATIVE /HPF
BASOPHILS # BLD AUTO: 0 K/UL (ref 0–0.06)
BASOPHILS # BLD: 0 % (ref 0–2)
BILIRUB SERPL-MCNC: 0.4 MG/DL (ref 0.2–1)
BILIRUB UR QL: NEGATIVE
BUN SERPL-MCNC: 10 MG/DL (ref 7–18)
BUN/CREAT SERPL: 10 (ref 12–20)
CALCIUM SERPL-MCNC: 10.2 MG/DL (ref 8.5–10.1)
CHLORIDE SERPL-SCNC: 101 MMOL/L (ref 100–108)
CO2 SERPL-SCNC: 29 MMOL/L (ref 21–32)
COLOR UR: YELLOW
CREAT SERPL-MCNC: 1.02 MG/DL (ref 0.6–1.3)
DIFFERENTIAL METHOD BLD: ABNORMAL
EOSINOPHIL # BLD: 0.2 K/UL (ref 0–0.4)
EOSINOPHIL NFR BLD: 2 % (ref 0–5)
EPITH CASTS URNS QL MICRO: NORMAL /LPF (ref 0–5)
ERYTHROCYTE [DISTWIDTH] IN BLOOD BY AUTOMATED COUNT: 12.7 % (ref 11.6–14.5)
GLOBULIN SER CALC-MCNC: 4.6 G/DL (ref 2–4)
GLUCOSE BLD STRIP.AUTO-MCNC: 96 MG/DL (ref 70–110)
GLUCOSE SERPL-MCNC: 162 MG/DL (ref 74–99)
GLUCOSE UR STRIP.AUTO-MCNC: >1000 MG/DL
HCT VFR BLD AUTO: 42.3 % (ref 35–45)
HGB BLD-MCNC: 14.2 G/DL (ref 12–16)
HGB UR QL STRIP: ABNORMAL
KETONES UR QL STRIP.AUTO: ABNORMAL MG/DL
LACTATE BLD-SCNC: 1.3 MMOL/L (ref 0.4–2)
LEUKOCYTE ESTERASE UR QL STRIP.AUTO: NEGATIVE
LIPASE SERPL-CCNC: 164 U/L (ref 73–393)
LYMPHOCYTES # BLD AUTO: 12 % (ref 21–52)
LYMPHOCYTES # BLD: 1.2 K/UL (ref 0.9–3.6)
MAGNESIUM SERPL-MCNC: 2.1 MG/DL (ref 1.8–2.4)
MCH RBC QN AUTO: 29 PG (ref 24–34)
MCHC RBC AUTO-ENTMCNC: 33.6 G/DL (ref 31–37)
MCV RBC AUTO: 86.5 FL (ref 74–97)
MONOCYTES # BLD: 0.5 K/UL (ref 0.05–1.2)
MONOCYTES NFR BLD AUTO: 5 % (ref 3–10)
NEUTS SEG # BLD: 7.6 K/UL (ref 1.8–8)
NEUTS SEG NFR BLD AUTO: 81 % (ref 40–73)
NITRITE UR QL STRIP.AUTO: NEGATIVE
PH UR STRIP: 7 [PH] (ref 5–8)
PLATELET # BLD AUTO: 407 K/UL (ref 135–420)
PMV BLD AUTO: 10.1 FL (ref 9.2–11.8)
POTASSIUM SERPL-SCNC: 4.6 MMOL/L (ref 3.5–5.5)
PROT SERPL-MCNC: 8.2 G/DL (ref 6.4–8.2)
PROT UR STRIP-MCNC: 30 MG/DL
RBC # BLD AUTO: 4.89 M/UL (ref 4.2–5.3)
RBC #/AREA URNS HPF: NORMAL /HPF (ref 0–5)
SODIUM SERPL-SCNC: 140 MMOL/L (ref 136–145)
SP GR UR REFRACTOMETRY: >1.03 (ref 1–1.03)
UROBILINOGEN UR QL STRIP.AUTO: 0.2 EU/DL (ref 0.2–1)
WBC # BLD AUTO: 9.4 K/UL (ref 4.6–13.2)
WBC URNS QL MICRO: NEGATIVE /HPF (ref 0–4)

## 2017-03-02 PROCEDURE — 83690 ASSAY OF LIPASE: CPT | Performed by: NURSE PRACTITIONER

## 2017-03-02 PROCEDURE — 74011636320 HC RX REV CODE- 636/320: Performed by: EMERGENCY MEDICINE

## 2017-03-02 PROCEDURE — 96374 THER/PROPH/DIAG INJ IV PUSH: CPT

## 2017-03-02 PROCEDURE — 77030020255 HC SOL INJ LR 1000ML BG

## 2017-03-02 PROCEDURE — 65270000029 HC RM PRIVATE

## 2017-03-02 PROCEDURE — 74011000258 HC RX REV CODE- 258: Performed by: NURSE PRACTITIONER

## 2017-03-02 PROCEDURE — 96375 TX/PRO/DX INJ NEW DRUG ADDON: CPT

## 2017-03-02 PROCEDURE — 87040 BLOOD CULTURE FOR BACTERIA: CPT | Performed by: NURSE PRACTITIONER

## 2017-03-02 PROCEDURE — 74011250636 HC RX REV CODE- 250/636: Performed by: NURSE PRACTITIONER

## 2017-03-02 PROCEDURE — 99285 EMERGENCY DEPT VISIT HI MDM: CPT

## 2017-03-02 PROCEDURE — 74011250636 HC RX REV CODE- 250/636: Performed by: SURGERY

## 2017-03-02 PROCEDURE — 74177 CT ABD & PELVIS W/CONTRAST: CPT

## 2017-03-02 PROCEDURE — 96361 HYDRATE IV INFUSION ADD-ON: CPT

## 2017-03-02 PROCEDURE — 74011250637 HC RX REV CODE- 250/637: Performed by: SURGERY

## 2017-03-02 PROCEDURE — 83735 ASSAY OF MAGNESIUM: CPT | Performed by: NURSE PRACTITIONER

## 2017-03-02 PROCEDURE — 80053 COMPREHEN METABOLIC PANEL: CPT | Performed by: NURSE PRACTITIONER

## 2017-03-02 PROCEDURE — 81001 URINALYSIS AUTO W/SCOPE: CPT | Performed by: NURSE PRACTITIONER

## 2017-03-02 PROCEDURE — 82962 GLUCOSE BLOOD TEST: CPT

## 2017-03-02 PROCEDURE — 74011250637 HC RX REV CODE- 250/637: Performed by: SPECIALIST

## 2017-03-02 PROCEDURE — 83605 ASSAY OF LACTIC ACID: CPT

## 2017-03-02 PROCEDURE — 85025 COMPLETE CBC W/AUTO DIFF WBC: CPT | Performed by: NURSE PRACTITIONER

## 2017-03-02 RX ORDER — SODIUM CHLORIDE, SODIUM LACTATE, POTASSIUM CHLORIDE, CALCIUM CHLORIDE 600; 310; 30; 20 MG/100ML; MG/100ML; MG/100ML; MG/100ML
150 INJECTION, SOLUTION INTRAVENOUS CONTINUOUS
Status: DISCONTINUED | OUTPATIENT
Start: 2017-03-02 | End: 2017-03-04 | Stop reason: HOSPADM

## 2017-03-02 RX ORDER — ALBUTEROL SULFATE 0.83 MG/ML
2.5 SOLUTION RESPIRATORY (INHALATION)
Status: DISCONTINUED | OUTPATIENT
Start: 2017-03-02 | End: 2017-03-04 | Stop reason: HOSPADM

## 2017-03-02 RX ORDER — ATORVASTATIN CALCIUM 40 MG/1
40 TABLET, FILM COATED ORAL
Status: DISCONTINUED | OUTPATIENT
Start: 2017-03-02 | End: 2017-03-04 | Stop reason: HOSPADM

## 2017-03-02 RX ORDER — BISOPROLOL FUMARATE AND HYDROCHLOROTHIAZIDE 2.5; 6.25 MG/1; MG/1
1 TABLET ORAL DAILY
Status: DISCONTINUED | OUTPATIENT
Start: 2017-03-03 | End: 2017-03-02

## 2017-03-02 RX ORDER — BISOPROLOL FUMARATE AND HYDROCHLOROTHIAZIDE 2.5; 6.25 MG/1; MG/1
1 TABLET ORAL DAILY
Status: DISCONTINUED | OUTPATIENT
Start: 2017-03-02 | End: 2017-03-04 | Stop reason: HOSPADM

## 2017-03-02 RX ORDER — AMLODIPINE BESYLATE 2.5 MG/1
2.5 TABLET ORAL DAILY
Status: DISCONTINUED | OUTPATIENT
Start: 2017-03-02 | End: 2017-03-04 | Stop reason: HOSPADM

## 2017-03-02 RX ORDER — ONDANSETRON 2 MG/ML
4 INJECTION INTRAMUSCULAR; INTRAVENOUS
Status: COMPLETED | OUTPATIENT
Start: 2017-03-02 | End: 2017-03-02

## 2017-03-02 RX ORDER — HYDROMORPHONE HYDROCHLORIDE 1 MG/ML
0.5 INJECTION, SOLUTION INTRAMUSCULAR; INTRAVENOUS; SUBCUTANEOUS
Status: DISCONTINUED | OUTPATIENT
Start: 2017-03-02 | End: 2017-03-04 | Stop reason: HOSPADM

## 2017-03-02 RX ORDER — ALBUTEROL SULFATE 90 UG/1
1 AEROSOL, METERED RESPIRATORY (INHALATION)
Status: DISCONTINUED | OUTPATIENT
Start: 2017-03-02 | End: 2017-03-02 | Stop reason: CLARIF

## 2017-03-02 RX ORDER — SODIUM CHLORIDE 9 MG/ML
250 INJECTION, SOLUTION INTRAVENOUS CONTINUOUS
Status: DISCONTINUED | OUTPATIENT
Start: 2017-03-02 | End: 2017-03-03

## 2017-03-02 RX ORDER — MORPHINE SULFATE 4 MG/ML
4 INJECTION, SOLUTION INTRAMUSCULAR; INTRAVENOUS
Status: COMPLETED | OUTPATIENT
Start: 2017-03-02 | End: 2017-03-02

## 2017-03-02 RX ORDER — HYDROMORPHONE HYDROCHLORIDE 1 MG/ML
0.5 INJECTION, SOLUTION INTRAMUSCULAR; INTRAVENOUS; SUBCUTANEOUS
Status: COMPLETED | OUTPATIENT
Start: 2017-03-02 | End: 2017-03-02

## 2017-03-02 RX ORDER — AMLODIPINE BESYLATE 2.5 MG/1
2.5 TABLET ORAL DAILY
Status: DISCONTINUED | OUTPATIENT
Start: 2017-03-03 | End: 2017-03-02

## 2017-03-02 RX ORDER — ONDANSETRON 2 MG/ML
6 INJECTION INTRAMUSCULAR; INTRAVENOUS
Status: DISCONTINUED | OUTPATIENT
Start: 2017-03-02 | End: 2017-03-04 | Stop reason: HOSPADM

## 2017-03-02 RX ADMIN — AMLODIPINE BESYLATE 2.5 MG: 2.5 TABLET ORAL at 22:04

## 2017-03-02 RX ADMIN — ATORVASTATIN CALCIUM 40 MG: 40 TABLET, FILM COATED ORAL at 22:04

## 2017-03-02 RX ADMIN — Medication 4 MG: at 16:44

## 2017-03-02 RX ADMIN — SODIUM CHLORIDE 1000 ML: 900 INJECTION, SOLUTION INTRAVENOUS at 13:06

## 2017-03-02 RX ADMIN — IOPAMIDOL 100 ML: 612 INJECTION, SOLUTION INTRAVENOUS at 14:47

## 2017-03-02 RX ADMIN — SODIUM CHLORIDE 250 ML/HR: 900 INJECTION, SOLUTION INTRAVENOUS at 14:19

## 2017-03-02 RX ADMIN — SODIUM CHLORIDE, SODIUM LACTATE, POTASSIUM CHLORIDE, AND CALCIUM CHLORIDE 150 ML/HR: 600; 310; 30; 20 INJECTION, SOLUTION INTRAVENOUS at 21:04

## 2017-03-02 RX ADMIN — HYDROMORPHONE HYDROCHLORIDE 0.5 MG: 1 INJECTION, SOLUTION INTRAMUSCULAR; INTRAVENOUS; SUBCUTANEOUS at 14:18

## 2017-03-02 RX ADMIN — Medication 4 MG: at 13:08

## 2017-03-02 RX ADMIN — BISOPROLOL FUMARATE AND HYDROCHLOROTHIAZIDE 1 TABLET: 6.25; 2.5 TABLET ORAL at 22:04

## 2017-03-02 RX ADMIN — ONDANSETRON 4 MG: 2 INJECTION INTRAMUSCULAR; INTRAVENOUS at 13:08

## 2017-03-02 RX ADMIN — HYDROMORPHONE HYDROCHLORIDE 0.5 MG: 1 INJECTION, SOLUTION INTRAMUSCULAR; INTRAVENOUS; SUBCUTANEOUS at 20:37

## 2017-03-02 RX ADMIN — PIPERACILLIN SODIUM,TAZOBACTAM SODIUM 4.5 G: 4; .5 INJECTION, POWDER, FOR SOLUTION INTRAVENOUS at 15:58

## 2017-03-02 NOTE — ED PROVIDER NOTES
HPI Comments: Pt with history of SBO surgery on 2/21/17 here by Dr. Velia Oneill presents with the chief complaint of nausea and vomiting since discharged from the hospital 2 days ago. Vomiting x 3 today of brown/greenish fluid and one soft brown BM. Pt states that her abdomen in \"sore\" and denies fever, urinary symptoms, drainage from wound. Pt spoke to Dr. Peggy Burden 2 days ago who recommended clear liquids x 24 hours which helped symptoms improve but they returned this morning. Pt denies any chest pain, shortness of breath, or any other complaints at this time. Patient is a 62 y.o. female presenting with abdominal pain, vomiting, and diarrhea. Abdominal Pain    Associated symptoms include nausea and vomiting. Pertinent negatives include no fever, no diarrhea, no frequency, no hematuria, no chest pain and no back pain. Vomiting    Associated symptoms include abdominal pain. Pertinent negatives include no chills, no fever, no diarrhea and no cough. Diarrhea    Associated symptoms include nausea and vomiting. Pertinent negatives include no fever, no diarrhea, no frequency, no hematuria, no chest pain and no back pain.         Past Medical History:   Diagnosis Date    Abdominal adhesions     Anemia     Arthritis     all over the body    Asthma     Asthma     Diabetes ([facility] [address]     Diabetes mellitus     Dyskinesia     bilateral    Essential hypertension     GERD (gastroesophageal reflux disease)     Hypertension     Menopause     Stool color black        Past Surgical History:   Procedure Laterality Date    HX CHOLECYSTECTOMY  6/28/10    HX HERNIA REPAIR      HX HYSTERECTOMY      Fibroids    HX KNEE REPLACEMENT      HX OOPHORECTOMY      TISSUE LOCALIZATION-EXCISION           Family History:   Problem Relation Age of Onset    Hypertension Other      parent    Breast Cancer Other 21    Heart Disease Father     Hypertension Father     Hypertension Other      sibling    Diabetes Other      parent Social History     Social History    Marital status:      Spouse name: N/A    Number of children: N/A    Years of education: N/A     Occupational History    Not on file. Social History Main Topics    Smoking status: Never Smoker    Smokeless tobacco: Never Used    Alcohol use No    Drug use: No    Sexual activity: Yes     Partners: Male     Birth control/ protection: None     Other Topics Concern    Not on file     Social History Narrative         ALLERGIES: Macrodantin [nitrofurantoin macrocrystalline] and Tape [adhesive]    Review of Systems   Constitutional: Negative. Negative for chills and fever. HENT: Negative. Negative for congestion, rhinorrhea and sore throat. Respiratory: Negative. Negative for cough, shortness of breath, wheezing and stridor. Cardiovascular: Negative. Negative for chest pain and palpitations. Gastrointestinal: Positive for abdominal pain, nausea and vomiting. Negative for diarrhea. Genitourinary: Negative. Negative for difficulty urinating, flank pain, frequency, hematuria, pelvic pain and urgency. Musculoskeletal: Negative. Negative for back pain, neck pain and neck stiffness. Skin: Negative. Negative for color change, pallor and rash. Neurological: Negative. Negative for dizziness, weakness and numbness. All other systems reviewed and are negative. Vitals:    03/02/17 1305 03/02/17 1306 03/02/17 1307 03/02/17 1308   BP:       Pulse:       Resp:       Temp:       SpO2: 100% 100% 100% 99%            Physical Exam   Constitutional: She is oriented to person, place, and time. She appears well-developed and well-nourished. No distress. HENT:   Head: Normocephalic and atraumatic. Eyes: Conjunctivae and EOM are normal. Pupils are equal, round, and reactive to light. Right eye exhibits no discharge. Left eye exhibits no discharge. Neck: Normal range of motion. Neck supple.    Cardiovascular: Normal rate, regular rhythm and normal heart sounds. Exam reveals no gallop and no friction rub. No murmur heard. Pulmonary/Chest: Effort normal and breath sounds normal. No stridor. No respiratory distress. She has no wheezes. She has no rales. She exhibits no tenderness. Abdominal: Soft. Bowel sounds are normal. She exhibits no distension and no mass. There is tenderness. There is no rebound and no guarding. Mild tenderness around surgical site only and within expected post-surgical range   Musculoskeletal: Normal range of motion. She exhibits no edema, tenderness or deformity. Neurological: She is alert and oriented to person, place, and time. She exhibits normal muscle tone. Coordination normal.   Skin: Skin is warm and dry. No rash noted. She is not diaphoretic. No erythema. No pallor. Stapled abdominal surgical site without erythema, swelling, or drainage. Mild tenderness within expected post surgical range   Psychiatric: She has a normal mood and affect. Her behavior is normal.   Nursing note and vitals reviewed. MDM  Number of Diagnoses or Management Options  Diagnosis management comments: Discussed pt with Dr. Dustin Grant immediately after exam. Will consult gen surgery after initial evaluation with labs and imaging. Pt not actively vomiting and declines offered g-tube. Incidental note-pt did not take any of her home meds today. Will hold po until determined if pt needs to return to surgery. Pt resting comfortably, still afebrile, looks well. Discussed CT report with Christian Breaux 4.5 g IV ordered and surgeon on call, Dr. Ruddy Blanc, paged.  3:42 PM    4:00 PM  Discussed with pt's surgeon, Dr Cyn Ferris, who accepted admission         Amount and/or Complexity of Data Reviewed  Clinical lab tests: ordered and reviewed  Tests in the radiology section of CPT®: ordered and reviewed    Risk of Complications, Morbidity, and/or Mortality  Presenting problems: moderate  Diagnostic procedures: moderate  Management options: moderate    Patient Progress  Patient progress: stable    ED Course       Procedures

## 2017-03-02 NOTE — IP AVS SNAPSHOT
[address] [place], [place] [address] Patient: Cari Cerrato MRN: [id number] CWY:9/9/0731 You are allergic to the following Allergen Reactions Macrodantin (Nitrofurantoin Macrocrystalline) Itching Tape (Adhesive) Other (comments) Burned skin when had wound vac Recent Documentation Height Weight BMI OB Status Smoking Status 1.626 m 68.9 kg 26.09 kg/m2 Hysterectomy Never Smoker Unresulted Labs Order Current Status CULTURE, BLOOD Preliminary result CULTURE, BLOOD Preliminary result Emergency Contacts Name Discharge Info Relation Home Work Mobile [facility] DISCHARGE CAREGIVER [3] Spouse [3] [phone] Christine Can  Mother [14]   [phone] About your hospitalization You were admitted on:  March 2, 2017 You last received care in the:  [address] [place] [address],[address] You were discharged on:  March 4, 2017 Unit phone number:  [phone] Why you were hospitalized Your primary diagnosis was:  Not on File Your diagnoses also included:  Pelvic Abscess In Female Providers Seen During Your Hospitalizations Provider Role Specialty Primary office phone Adria Linda MD Attending Provider Emergency Medicine [phone] Felisha Roy MD Attending Provider Surgery [phone] Your Primary Care Physician (PCP) Primary Care Physician Office Phone Office Fax Sushma Lynch [phone] Follow-up Information Follow up With Details Comments Contact Info Felisha Roy MD In 1 week  [facility] [address] [place] [id number] [phone] Nate Cedeño MD   [address] [place] [address] 
[phone] Felisha Roy MD In 1 week Follow up appoinment [facility] [address] [facility] [phone] 
[phone] Your Appointments Monday March 13, 2017  9:00 AM EDT  
POST OP with Ilir Rincon MD  
[address] [address]  
[phone] Current Discharge Medication List  
  
START taking these medications Dose & Instructions Dispensing Information Comments Morning Noon Evening Bedtime  
 oxyCODONE-acetaminophen 5-325 mg per tablet Commonly known as:  PERCOCET Your next dose is: Today, Tomorrow Other:  _________ Dose:  1-2 Tab Take 1-2 Tabs by mouth every four (4) hours as needed for Pain. Max Daily Amount: 12 Tabs. Quantity:  30 Tab Refills:  0 CONTINUE these medications which have CHANGED Dose & Instructions Dispensing Information Comments Morning Noon Evening Bedtime  
 docusate sodium 100 mg capsule Commonly known as:  Ltanya Isaiah What changed:   
- when to take this 
- reasons to take this Your next dose is: Today, Tomorrow Other:  _________ Dose:  100 mg Take 1 Cap by mouth two (2) times a day for 90 days. Quantity:  60 Cap Refills:  2  
     
   
   
   
  
 * ondansetron hcl 4 mg tablet Commonly known as:  ZOFRAN (AS HYDROCHLORIDE) What changed:  Another medication with the same name was added. Make sure you understand how and when to take each. Your next dose is: Today, Tomorrow Other:  _________ Dose:  4 mg Take 1 Tab by mouth every eight (8) hours as needed for Nausea. Quantity:  20 Tab Refills:  0  
     
   
   
   
  
 * ondansetron hcl 4 mg tablet Commonly known as:  ZOFRAN (AS HYDROCHLORIDE) What changed: You were already taking a medication with the same name, and this prescription was added. Make sure you understand how and when to take each. Your next dose is: Today, Tomorrow Other:  _________ Dose:  4 mg Take 1 Tab by mouth every eight (8) hours as needed for Nausea. Quantity:  30 Tab Refills:  1  
     
   
   
   
  
 * Notice: This list has 2 medication(s) that are the same as other medications prescribed for you. Read the directions carefully, and ask your doctor or other care provider to review them with you. CONTINUE these medications which have NOT CHANGED Dose & Instructions Dispensing Information Comments Morning Noon Evening Bedtime  
 albuterol 90 mcg/actuation inhaler Commonly known as:  PROVENTIL HFA, VENTOLIN HFA, PROAIR HFA Your next dose is: Today, Tomorrow Other:  _________ Dose:  1 Puff Take 1 Puff by inhalation every six (6) hours as needed for Wheezing. Refills:  0  
     
   
   
   
  
 amLODIPine 2.5 mg tablet Commonly known as:  Elspeth Bakes Your next dose is: Today, Tomorrow Other:  _________ Dose:  2.5 mg Take 2.5 mg by mouth nightly. Refills:  0 FARXIGA 10 mg Tab Generic drug:  dapagliflozin Your next dose is: Today, Tomorrow Other:  _________ Take  by mouth daily. Indications: type 2 diabetes mellitus Refills:  0 GLUMETZA 500 mg Tg24 24 hour tablet Generic drug:  metFORMIN Your next dose is: Today, Tomorrow Other:  _________ Dose:  500 mg Take 500 mg by mouth two (2) times a day. Refills:  0 LIPITOR 20 mg tablet Generic drug:  atorvastatin Your next dose is: Today, Tomorrow Other:  _________ Dose:  40 mg Take 40 mg by mouth daily. Refills:  0  
     
   
   
   
  
 omeprazole 10 mg capsule Commonly known as:  PRILOSEC Your next dose is: Today, Tomorrow Other:  _________ Dose:  10 mg Take 10 mg by mouth daily. Refills:  0 ZIAC 2.5-6.25 mg per tablet Generic drug:  bisoprolol-hydroCHLOROthiazide Your next dose is: Today, Tomorrow Other:  _________ Dose:  1 Tab Take 1 Tab by mouth daily. Refills:  0 Where to Get Your Medications Information on where to get these meds will be given to you by the nurse or doctor. ! Ask your nurse or doctor about these medications  
  ondansetron hcl 4 mg tablet  
 oxyCODONE-acetaminophen 5-325 mg per tablet Discharge Instructions DISCHARGE SUMMARY from Nurse The following personal items are in your possession at time of discharge: 
 
Dental Appliances: None Home Medications: None Jewelry: None Clothing: None Other Valuables: Mau [place] PATIENT INSTRUCTIONS: 
 
After general anesthesia or intravenous sedation, for 24 hours or while taking prescription Narcotics: · Limit your activities · Do not drive and operate hazardous machinery · Do not make important personal or business decisions · Do  not drink alcoholic beverages · If you have not urinated within 8 hours after discharge, please contact your surgeon on call. Report the following to your surgeon: 
· Excessive pain, swelling, redness or odor of or around the surgical area · Temperature over 100.5 · Nausea and vomiting lasting longer than 4 hours or if unable to take medications · Any signs of decreased circulation or nerve impairment to extremity: change in color, persistent  numbness, tingling, coldness or increase pain · Any questions What to do at Home: 
Recommended activity: Activity as tolerated and no driving for today, ambulate in house, no lifting, driving, or strenuous exercise,no driving while on analgesics or until it's ok by Dr Mari Jones.  
 
If you experience any of the following symptoms fever greater than 101 degrees or more, uncontrolled pain, uncontrolled nausea/vomiting, any new numbness or tingling, inability to urinate for 8 or more hours, any signs of wound infection (drainage, odor, warmth, swelling), or any other questions 
please follow up with Dr Paula Li. *  Please give a list of your current medications to your Primary Care Provider. *  Please update this list whenever your medications are discontinued, doses are 
    changed, or new medications (including over-the-counter products) are added. *  Please carry medication information at all times in case of emergency situations. These are general instructions for a healthy lifestyle: No smoking/ No tobacco products/ Avoid exposure to second hand smoke Surgeon General's Warning:  Quitting smoking now greatly reduces serious risk to your health. Obesity, smoking, and sedentary lifestyle greatly increases your risk for illness A healthy diet, regular physical exercise & weight monitoring are important for maintaining a healthy lifestyle You may be retaining fluid if you have a history of heart failure or if you experience any of the following symptoms:  Weight gain of 3 pounds or more overnight or 5 pounds in a week, increased swelling in our hands or feet or shortness of breath while lying flat in bed. Please call your doctor as soon as you notice any of these symptoms; do not wait until your next office visit. Recognize signs and symptoms of STROKE: 
 
F-face looks uneven A-arms unable to move or move unevenly S-speech slurred or non-existent T-time-call 911 as soon as signs and symptoms begin-DO NOT go Back to bed or wait to see if you get better-TIME IS BRAIN. Warning Signs of HEART ATTACK Call 911 if you have these symptoms: 
? Chest discomfort. Most heart attacks involve discomfort in the center of the chest that lasts more than a few minutes, or that goes away and comes back. It can feel like uncomfortable pressure, squeezing, fullness, or pain. ? Discomfort in other areas of the upper body. Symptoms can include pain or discomfort in one or both arms, the back, neck, jaw, or stomach. ? Shortness of breath with or without chest discomfort. ? Other signs may include breaking out in a cold sweat, nausea, or lightheadedness. Don't wait more than five minutes to call [address]! Fast action can save your life. Calling 911 is almost always the fastest way to get lifesaving treatment. Emergency Medical Services staff can begin treatment when they arrive  up to an hour sooner than if someone gets to the hospital by car. The discharge information has been reviewed with the patient. The patient verbalized understanding. Discharge medications reviewed with the patient and appropriate educational materials and side effects teaching were provided. Patient armband removed and shredded Discharge Orders None Introducing [facility]! Alberto Kaminski introduces ilab patient portal. Now you can access parts of your medical record, email your doctor's office, and request medication refills online. 1. In your internet browser, go to [email or web address] 2. Click on the First Time User? Click Here link in the Sign In box. You will see the New Member Sign Up page. 3. Enter your ilab Access Code exactly as it appears below. You will not need to use this code after youve completed the sign-up process. If you do not sign up before the expiration date, you must request a new code. · ilab Access Code: [email or web address] Expires: 4/22/2017  1:41 PM 
 
4. Enter the last four digits of your Social Security Number (xxxx) and Date of Birth (mm/dd/yyyy) as indicated and click Submit. You will be taken to the next sign-up page. 5. Create a ilab ID. This will be your ilab login ID and cannot be changed, so think of one that is secure and easy to remember. 6. Create a [email or web address] password. You can change your password at any time. 7. Enter your Password Reset Question and Answer. This can be used at a later time if you forget your password. 8. Enter your e-mail address. You will receive e-mail notification when new information is available in [address] 9. Click Sign Up. You can now view and download portions of your medical record. 10. Click the Download Summary menu link to download a portable copy of your medical information. If you have questions, please visit the Frequently Asked Questions section of the [email or web address] website. Remember, [email or web address] is NOT to be used for urgent needs. For medical emergencies, dial 911. Now available from your iPhone and Android! General Information Please provide this summary of care documentation to your next provider. Patient Signature:  ____________________________________________________________ Date:  ____________________________________________________________  
  
Andree Vazquez Provider Signature:  ____________________________________________________________ Date:  ____________________________________________________________

## 2017-03-02 NOTE — IP AVS SNAPSHOT
Current Discharge Medication List  
  
Take these medications at their scheduled times Dose & Instructions Dispensing Information Comments Morning Noon Evening Bedtime  
 amLODIPine 2.5 mg tablet Commonly known as:  Erica Croon Your next dose is: Today, Tomorrow Other:  ____________ Dose:  2.5 mg Take 2.5 mg by mouth nightly. Refills:  0  
     
   
   
   
  
 docusate sodium 100 mg capsule Commonly known as:  Orest Farm Your next dose is: Today, Tomorrow Other:  ____________ Dose:  100 mg Take 1 Cap by mouth two (2) times a day for 90 days. Quantity:  60 Cap Refills:  2 FARXIGA 10 mg Tab Generic drug:  dapagliflozin Your next dose is: Today, Tomorrow Other:  ____________ Take  by mouth daily. Indications: type 2 diabetes mellitus Refills:  0 GLUMETZA 500 mg Tg24 24 hour tablet Generic drug:  metFORMIN Your next dose is: Today, Tomorrow Other:  ____________ Dose:  500 mg Take 500 mg by mouth two (2) times a day. Refills:  0 LIPITOR 20 mg tablet Generic drug:  atorvastatin Your next dose is: Today, Tomorrow Other:  ____________ Dose:  40 mg Take 40 mg by mouth daily. Refills:  0  
     
   
   
   
  
 omeprazole 10 mg capsule Commonly known as:  PRILOSEC Your next dose is: Today, Tomorrow Other:  ____________ Dose:  10 mg Take 10 mg by mouth daily. Refills:  0 ZIAC 2.5-6.25 mg per tablet Generic drug:  bisoprolol-hydroCHLOROthiazide Your next dose is: Today, Tomorrow Other:  ____________ Dose:  1 Tab Take 1 Tab by mouth daily. Refills:  0 Take these medications as needed Dose & Instructions Dispensing Information Comments Morning Noon Evening Bedtime albuterol 90 mcg/actuation inhaler Commonly known as:  PROVENTIL HFA, VENTOLIN HFA, PROAIR HFA Your next dose is: Today, Tomorrow Other:  ____________ Dose:  1 Puff Take 1 Puff by inhalation every six (6) hours as needed for Wheezing. Refills:  0  
     
   
   
   
  
 * ondansetron hcl 4 mg tablet Commonly known as:  ZOFRAN (AS HYDROCHLORIDE) Your next dose is: Today, Tomorrow Other:  ____________ Dose:  4 mg Take 1 Tab by mouth every eight (8) hours as needed for Nausea. Quantity:  20 Tab Refills:  0  
     
   
   
   
  
 * ondansetron hcl 4 mg tablet Commonly known as:  ZOFRAN (AS HYDROCHLORIDE) Your next dose is: Today, Tomorrow Other:  ____________ Dose:  4 mg Take 1 Tab by mouth every eight (8) hours as needed for Nausea. Quantity:  30 Tab Refills:  1  
     
   
   
   
  
 oxyCODONE-acetaminophen 5-325 mg per tablet Commonly known as:  PERCOCET Your next dose is: Today, Tomorrow Other:  ____________ Dose:  1-2 Tab Take 1-2 Tabs by mouth every four (4) hours as needed for Pain. Max Daily Amount: 12 Tabs. Quantity:  30 Tab Refills:  0  
     
   
   
   
  
 * Notice: This list has 2 medication(s) that are the same as other medications prescribed for you. Read the directions carefully, and ask your doctor or other care provider to review them with you. Where to Get Your Medications Information about where to get these medications is not yet available ! Ask your nurse or doctor about these medications  
  ondansetron hcl 4 mg tablet  
 oxyCODONE-acetaminophen 5-325 mg per tablet

## 2017-03-02 NOTE — ED NOTES
Pt given another warm blanket for comfort. Pt also updated on plan of care. Pt sts that her pain is better since she received the last shot.

## 2017-03-02 NOTE — ED TRIAGE NOTES
Pt complains of diarrhea and vomiting times 2 days.   Pt states she was discharges 2 days ago after having bowel surgery

## 2017-03-03 LAB
GLUCOSE BLD STRIP.AUTO-MCNC: 100 MG/DL (ref 70–110)
GLUCOSE BLD STRIP.AUTO-MCNC: 105 MG/DL (ref 70–110)
GLUCOSE BLD STRIP.AUTO-MCNC: 143 MG/DL (ref 70–110)
GLUCOSE BLD STRIP.AUTO-MCNC: 143 MG/DL (ref 70–110)

## 2017-03-03 PROCEDURE — 74011000250 HC RX REV CODE- 250: Performed by: SURGERY

## 2017-03-03 PROCEDURE — 74011250636 HC RX REV CODE- 250/636: Performed by: SURGERY

## 2017-03-03 PROCEDURE — 65270000029 HC RM PRIVATE

## 2017-03-03 PROCEDURE — 74011000258 HC RX REV CODE- 258: Performed by: SURGERY

## 2017-03-03 PROCEDURE — 74011250637 HC RX REV CODE- 250/637: Performed by: SURGERY

## 2017-03-03 PROCEDURE — 77030020255 HC SOL INJ LR 1000ML BG

## 2017-03-03 PROCEDURE — C9113 INJ PANTOPRAZOLE SODIUM, VIA: HCPCS | Performed by: SURGERY

## 2017-03-03 PROCEDURE — 82962 GLUCOSE BLOOD TEST: CPT

## 2017-03-03 RX ORDER — DIPHENHYDRAMINE HCL 25 MG
25 CAPSULE ORAL
Status: DISCONTINUED | OUTPATIENT
Start: 2017-03-03 | End: 2017-03-04 | Stop reason: HOSPADM

## 2017-03-03 RX ORDER — METOCLOPRAMIDE HYDROCHLORIDE 5 MG/ML
10 INJECTION INTRAMUSCULAR; INTRAVENOUS EVERY 6 HOURS
Status: DISCONTINUED | OUTPATIENT
Start: 2017-03-03 | End: 2017-03-04 | Stop reason: HOSPADM

## 2017-03-03 RX ORDER — INSULIN LISPRO 100 [IU]/ML
INJECTION, SOLUTION INTRAVENOUS; SUBCUTANEOUS
Status: DISCONTINUED | OUTPATIENT
Start: 2017-03-03 | End: 2017-03-04 | Stop reason: HOSPADM

## 2017-03-03 RX ADMIN — HYDROMORPHONE HYDROCHLORIDE 0.5 MG: 1 INJECTION, SOLUTION INTRAMUSCULAR; INTRAVENOUS; SUBCUTANEOUS at 00:56

## 2017-03-03 RX ADMIN — HYDROMORPHONE HYDROCHLORIDE 0.5 MG: 1 INJECTION, SOLUTION INTRAMUSCULAR; INTRAVENOUS; SUBCUTANEOUS at 06:38

## 2017-03-03 RX ADMIN — SODIUM CHLORIDE 20 MG: 9 INJECTION INTRAMUSCULAR; INTRAVENOUS; SUBCUTANEOUS at 09:13

## 2017-03-03 RX ADMIN — HYDROMORPHONE HYDROCHLORIDE 0.5 MG: 1 INJECTION, SOLUTION INTRAMUSCULAR; INTRAVENOUS; SUBCUTANEOUS at 19:05

## 2017-03-03 RX ADMIN — METOCLOPRAMIDE 10 MG: 5 INJECTION, SOLUTION INTRAMUSCULAR; INTRAVENOUS at 09:13

## 2017-03-03 RX ADMIN — ATORVASTATIN CALCIUM 40 MG: 40 TABLET, FILM COATED ORAL at 22:29

## 2017-03-03 RX ADMIN — HYDROMORPHONE HYDROCHLORIDE 0.5 MG: 1 INJECTION, SOLUTION INTRAMUSCULAR; INTRAVENOUS; SUBCUTANEOUS at 04:31

## 2017-03-03 RX ADMIN — SODIUM CHLORIDE, SODIUM LACTATE, POTASSIUM CHLORIDE, AND CALCIUM CHLORIDE 150 ML/HR: 600; 310; 30; 20 INJECTION, SOLUTION INTRAVENOUS at 06:38

## 2017-03-03 RX ADMIN — HYDROMORPHONE HYDROCHLORIDE 0.5 MG: 1 INJECTION, SOLUTION INTRAMUSCULAR; INTRAVENOUS; SUBCUTANEOUS at 14:38

## 2017-03-03 RX ADMIN — METOCLOPRAMIDE 10 MG: 5 INJECTION, SOLUTION INTRAMUSCULAR; INTRAVENOUS at 14:43

## 2017-03-03 RX ADMIN — METOCLOPRAMIDE 10 MG: 5 INJECTION, SOLUTION INTRAMUSCULAR; INTRAVENOUS at 21:05

## 2017-03-03 RX ADMIN — DIPHENHYDRAMINE HYDROCHLORIDE 25 MG: 25 CAPSULE ORAL at 08:04

## 2017-03-03 RX ADMIN — PIPERACILLIN SODIUM,TAZOBACTAM SODIUM 3.38 G: 3; .375 INJECTION, POWDER, FOR SOLUTION INTRAVENOUS at 17:26

## 2017-03-03 RX ADMIN — DIPHENHYDRAMINE HYDROCHLORIDE 25 MG: 25 CAPSULE ORAL at 17:25

## 2017-03-03 RX ADMIN — SODIUM CHLORIDE, SODIUM LACTATE, POTASSIUM CHLORIDE, AND CALCIUM CHLORIDE 150 ML/HR: 600; 310; 30; 20 INJECTION, SOLUTION INTRAVENOUS at 17:25

## 2017-03-03 RX ADMIN — HYDROMORPHONE HYDROCHLORIDE 0.5 MG: 1 INJECTION, SOLUTION INTRAMUSCULAR; INTRAVENOUS; SUBCUTANEOUS at 10:33

## 2017-03-03 RX ADMIN — PIPERACILLIN SODIUM,TAZOBACTAM SODIUM 3.38 G: 3; .375 INJECTION, POWDER, FOR SOLUTION INTRAVENOUS at 09:10

## 2017-03-03 RX ADMIN — SODIUM CHLORIDE, SODIUM LACTATE, POTASSIUM CHLORIDE, AND CALCIUM CHLORIDE 150 ML/HR: 600; 310; 30; 20 INJECTION, SOLUTION INTRAVENOUS at 19:55

## 2017-03-03 RX ADMIN — HYDROMORPHONE HYDROCHLORIDE 0.5 MG: 1 INJECTION, SOLUTION INTRAMUSCULAR; INTRAVENOUS; SUBCUTANEOUS at 22:42

## 2017-03-03 NOTE — H&P
General Surgery Consult    Phil Cancer  Admit date: 3/2/2017    MRN: [id number]     : 1960     Age: 62 y.o. Attending Physician: Lexi Barrow MD, [facility]      History of Present Illness:      Phil Whitmore is a 62 y.o. female who is very well know to me. I did exp laparotomy for small bowel resection last month. She was discharged last week. She presented to ER with vomiting and abdominal pain. Her pain is in bilateral flank. She is passing flatus and stool. No fever or chills. Had a CT scan that showed no bowel obstruction but a collection in the pelvis about 5 cm.      Patient Active Problem List    Diagnosis Date Noted    Pelvic abscess in female 2017    SBO (small bowel obstruction) ([address] 2017    Osteoarthritis of left knee 2016    Hypertension 2016    Hyperlipidemia 2016    GERD (gastroesophageal reflux disease) 2016    Asthma 2016    Type 2 diabetes mellitus ([address] 2016     Past Medical History:   Diagnosis Date    Abdominal adhesions     Anemia     Arthritis     all over the body    Asthma     Asthma     Diabetes ([address]     Diabetes mellitus     Dyskinesia     bilateral    Essential hypertension     GERD (gastroesophageal reflux disease)     Hypertension     Menopause     Stool color black       Past Surgical History:   Procedure Laterality Date    HX CHOLECYSTECTOMY  6/28/10    HX HERNIA REPAIR      HX HYSTERECTOMY      Fibroids    HX KNEE REPLACEMENT      HX OOPHORECTOMY      TISSUE LOCALIZATION-EXCISION        Social History   Substance Use Topics    Smoking status: Never Smoker    Smokeless tobacco: Never Used    Alcohol use No      History   Smoking Status    Never Smoker   Smokeless Tobacco    Never Used     Family History   Problem Relation Age of Onset    Hypertension Other      parent    Breast Cancer Other 21    Heart Disease Father     Hypertension Father     Hypertension Other      sibling    Diabetes Other      parent      Current Facility-Administered Medications   Medication Dose Route Frequency    pantoprazole (PROTONIX) 20 mg in sodium chloride 0.9 % 5 mL injection  20 mg IntraVENous DAILY    0.9% sodium chloride infusion  250 mL/hr IntraVENous CONTINUOUS    lactated ringers infusion  150 mL/hr IntraVENous CONTINUOUS    ondansetron (ZOFRAN) injection 6 mg  6 mg IntraVENous Q4H PRN    HYDROmorphone (PF) (DILAUDID) injection 0.5 mg  0.5 mg IntraVENous Q2H PRN    atorvastatin (LIPITOR) tablet 40 mg  40 mg Oral QHS    albuterol (PROVENTIL VENTOLIN) nebulizer solution 2.5 mg  2.5 mg Nebulization Q6H PRN    bisoprolol-hydroCHLOROthiazide (ZIAC) 2.5-6.25 mg per tablet 1 Tab  1 Tab Oral DAILY    amLODIPine (NORVASC) tablet 2.5 mg  2.5 mg Oral DAILY      Allergies   Allergen Reactions    Macrodantin [Nitrofurantoin Macrocrystalline] Itching    Tape [Adhesive] Other (comments)     Burned skin when had wound vac        Review of Systems:  Pertinent items are noted in the History of Present Illness. Objective:     Visit Vitals    /75 (BP 1 Location: Right arm)    Pulse 75    Temp 98.5 °F (36.9 °C)    Resp 20    SpO2 96%       Physical Exam:      General:  in no apparent distress and well developed and well nourished   Eyes:  conjunctivae and sclerae normal, pupils equal, round, reactive to light   Throat & Neck: no erythema or exudates noted and neck supple and symmetrical; no palpable masses   Lungs:   clear to auscultation bilaterally   Heart:  Regular rate and rhythm   Abdomen:   flat, soft, nontender, nondistended, no masses or organomegaly. Wound is healing well.     Extremities: extremities normal, atraumatic, no cyanosis or edema   Skin: Normal.       Imaging and Lab Review:     CBC:   Lab Results   Component Value Date/Time    WBC 9.4 03/02/2017 11:35 AM    RBC 4.89 03/02/2017 11:35 AM    HGB 14.2 03/02/2017 11:35 AM    HCT 42.3 03/02/2017 11:35 AM PLATELET 746 49/93/4772 11:35 AM     BMP:   Lab Results   Component Value Date/Time    Glucose 162 03/02/2017 11:35 AM    Sodium 140 03/02/2017 11:35 AM    Potassium 4.6 03/02/2017 11:35 AM    Chloride 101 03/02/2017 11:35 AM    CO2 29 03/02/2017 11:35 AM    BUN 10 03/02/2017 11:35 AM    Creatinine 1.02 03/02/2017 11:35 AM    Calcium 10.2 03/02/2017 11:35 AM     CMP:  Lab Results   Component Value Date/Time    Glucose 162 03/02/2017 11:35 AM    Sodium 140 03/02/2017 11:35 AM    Potassium 4.6 03/02/2017 11:35 AM    Chloride 101 03/02/2017 11:35 AM    CO2 29 03/02/2017 11:35 AM    BUN 10 03/02/2017 11:35 AM    Creatinine 1.02 03/02/2017 11:35 AM    Calcium 10.2 03/02/2017 11:35 AM    Anion gap 10 03/02/2017 11:35 AM    BUN/Creatinine ratio 10 03/02/2017 11:35 AM    Alk. phosphatase 184 03/02/2017 11:35 AM    Protein, total 8.2 03/02/2017 11:35 AM    Albumin 3.6 03/02/2017 11:35 AM    Globulin 4.6 03/02/2017 11:35 AM    A-G Ratio 0.8 03/02/2017 11:35 AM       Recent Results (from the past 24 hour(s))   CBC WITH AUTOMATED DIFF    Collection Time: 03/02/17 11:35 AM   Result Value Ref Range    WBC 9.4 4.6 - 13.2 K/uL    RBC 4.89 4.20 - 5.30 M/uL    HGB 14.2 12.0 - 16.0 g/dL    HCT 42.3 35.0 - 45.0 %    MCV 86.5 74.0 - 97.0 FL    MCH 29.0 24.0 - 34.0 PG    MCHC 33.6 31.0 - 37.0 g/dL    RDW 12.7 11.6 - 14.5 %    PLATELET 478 817 - 108 K/uL    MPV 10.1 9.2 - 11.8 FL    NEUTROPHILS 81 (H) 40 - 73 %    LYMPHOCYTES 12 (L) 21 - 52 %    MONOCYTES 5 3 - 10 %    EOSINOPHILS 2 0 - 5 %    BASOPHILS 0 0 - 2 %    ABS. NEUTROPHILS 7.6 1.8 - 8.0 K/UL    ABS. LYMPHOCYTES 1.2 0.9 - 3.6 K/UL    ABS. MONOCYTES 0.5 0.05 - 1.2 K/UL    ABS. EOSINOPHILS 0.2 0.0 - 0.4 K/UL    ABS.  BASOPHILS 0.0 0.0 - 0.06 K/UL    DF AUTOMATED     METABOLIC PANEL, COMPREHENSIVE    Collection Time: 03/02/17 11:35 AM   Result Value Ref Range    Sodium 140 136 - 145 mmol/L    Potassium 4.6 3.5 - 5.5 mmol/L    Chloride 101 100 - 108 mmol/L    CO2 29 21 - 32 mmol/L Anion gap 10 3.0 - 18 mmol/L    Glucose 162 (H) 74 - 99 mg/dL    BUN 10 7.0 - 18 MG/DL    Creatinine 1.02 0.6 - 1.3 MG/DL    BUN/Creatinine ratio 10 (L) 12 - 20      GFR est AA >60 >60 ml/min/1.73m2    GFR est non-AA 56 (L) >60 ml/min/1.73m2    Calcium 10.2 (H) 8.5 - 10.1 MG/DL    Bilirubin, total 0.4 0.2 - 1.0 MG/DL    ALT (SGPT) 30 13 - 56 U/L    AST (SGOT) 34 15 - 37 U/L    Alk.  phosphatase 184 (H) 45 - 117 U/L    Protein, total 8.2 6.4 - 8.2 g/dL    Albumin 3.6 3.4 - 5.0 g/dL    Globulin 4.6 (H) 2.0 - 4.0 g/dL    A-G Ratio 0.8 0.8 - 1.7     MAGNESIUM    Collection Time: 03/02/17 11:35 AM   Result Value Ref Range    Magnesium 2.1 1.8 - 2.4 mg/dL   LIPASE    Collection Time: 03/02/17 11:35 AM   Result Value Ref Range    Lipase 164 73 - 393 U/L   CULTURE, BLOOD    Collection Time: 03/02/17 11:35 AM   Result Value Ref Range    Special Requests: PERIPHERAL      Culture result: PENDING    POC LACTIC ACID    Collection Time: 03/02/17 11:49 AM   Result Value Ref Range    Lactic Acid (POC) 1.3 0.4 - 2.0 mmol/L   URINALYSIS W/ RFLX MICROSCOPIC    Collection Time: 03/02/17 12:38 PM   Result Value Ref Range    Color YELLOW      Appearance CLEAR      Specific gravity >1.030 (H) 1.005 - 1.030    pH (UA) 7.0 5.0 - 8.0      Protein 30 (A) NEG mg/dL    Glucose >1000 (A) NEG mg/dL    Ketone TRACE (A) NEG mg/dL    Bilirubin NEGATIVE  NEG      Blood SMALL (A) NEG      Urobilinogen 0.2 0.2 - 1.0 EU/dL    Nitrites NEGATIVE  NEG      Leukocyte Esterase NEGATIVE  NEG     URINE MICROSCOPIC ONLY    Collection Time: 03/02/17 12:38 PM   Result Value Ref Range    WBC NEGATIVE  0 - 4 /hpf    RBC 0 to 3 0 - 5 /hpf    Epithelial cells 1+ 0 - 5 /lpf    Bacteria NEGATIVE  NEG /hpf   GLUCOSE, POC    Collection Time: 03/02/17 10:41 PM   Result Value Ref Range    Glucose (POC) 96 70 - 110 mg/dL   GLUCOSE, POC    Collection Time: 03/03/17  6:31 AM   Result Value Ref Range    Glucose (POC) 105 70 - 110 mg/dL       images and reports reviewed    Assessment:   Ayaz Echevarria is a 62 y.o. female is presenting with abdominal pain and vomiting,. She is feeling much better today. I don't think her pelvic collection is an aabscess. No high WBC, no fever, no pelvic pain, no urinary symptoms and no diarrhea.      Plan:     Diet: NPO except sips of water and medications  IVF for hydration  Correction of any electrolytes abnormalities  DVT prophylaxis  Ambulation as tolerated  Antibiotics    Please call me if you have any questions (cell phone: [phone])     Signed By: Victor Manuel Yang MD     March 3, 2017

## 2017-03-03 NOTE — PROGRESS NOTES
**P&T Committee has authorized the Automatic Substitution of  albuterol Oral Inhaler to abuterol nebulizer solution. **  **If the use of an inhaler device is of medical necessity, please indicate Do Not Substitute or Dispense As Written below. **  **[facility] will administer all doses of nebulizer solution in this facility. **

## 2017-03-03 NOTE — PROGRESS NOTES
[facility]/[address]   Discharge Planning/ Assessment    Reasons for Intervention: Chart reviewed and pt verified demographics. She has [place] and [employer]. Her PCP is Dr Harpal Dennis. She lives with her mom and her . She states she was independent with care, but that her family helped her a lot anyways. She has a walker with wheels, shower seat, raised toilet at home. Previously she had knee replacement and [facility] PT/ OT came to visit at that time. She plans to go home when medically stable. Will continue to monitor for needs.     High Risk Criteria  [] Yes  [x]No   Physician Referral  [] Yes  [x]No        Date    Nursing Referral  [] Yes  [x]No        Date    Patient/Family Request  [] Yes  [x]No        Date       Resources:    Medicare  [] Yes  [x]No   Medicaid  [] Yes  [x]No   No Resources  [] Yes  [x]No   Private Insurance  [x] Yes  []No    Name/Phone Number    Other  [] Yes  [x]No        (i.e. Workman's Comp)         Prior Services:    Prior Services  [x] Yes  []No   Home Health  [x] Yes  []No   [facility]  [] Yes  [x]No        [address] of [address] Program  [] Yes  [x]No       Meals on Wheels  [] Yes  [x]No   Office on Aging  [] Yes  [x]No   Transportation Services  [] Yes  [x]No   [address] [employer]  [] Yes  [x]No        Nursing Home Name    [facility]  [] Yes  [x]No        [address] Name    Other       Information Source:      Information obtained from  [x] Patient  [] Parent   [] [address]   [] Child  [] Spouse   [] Significant Other/Partner   [] Friend      [] EMS    [] Nursing Home Chart          [] Other:   Chart Review  [] Yes  []No     Family/Support System:    Patient lives with  [] Alone    [x] Spouse   [] Significant Other  [] Children  [] Caretaker   [x] Parent  [] Sibling     [] Other       Other Support System:    Is the patient responsible for care of others  [] Yes  [x]No   Information of person caring for patient on  discharge    Managers financial affairs independently  [x] Yes  []No   If no, explain:      Status Prior to Admission:    Mental Status  [x] Awake  [x] Alert  [x] Oriented  [] Quiet/Calm [] Lethargic/Sedated   [] Disoriented  [] Restless/Anxious  [] Combative   Personal Care  [] Dependent  [x] [address]  [] Requires Assistance   Meal Preparation Ability  [x] Independent   [] Standby Assistance   [] Minimal Assistance   [] Moderate Assistance  [] Maximum Assistance     [] Total Assistance   Chores  [x] Independent with Chores   [] N/A Nursing Home Resident   [] Requires Assistance   Bowel/Bladder  [x] Continent  [] Catheter  [] Incontinent  [] Ostomy Self-Care    [] Urine Diversion Self-Care  [] Maximum Assistance     [] Total Assistance   Number of Persons needed for assistance    DME at home  [] Gus Hanna  [] Steffen Hanna   [] Commode    [] Bathroom/Grab Bars  [] Hospital Bed  [] Nebulizer  [] Oxygen           [x] Raised Toilet Seat  [x] Shower Chair  [] Side Rails for Bed   [] Tub Transfer Bench   [x] Piyush Sheerer  [] Jeovany Harding Standard      [] Other:    Vendor      Treatment Presently Receiving:    Current Treatments  [] Chemotherapy  [] Dialysis  [] Insulin  [] IVAB [x] IVF   [] O2  [] PCA   [] PT   [] RT   [] Tube Feedings   [] Wound Care     Psychosocial Evaluation:    Verbalized Knowledge of Disease Process  [] Patient  []Family   Coping with Disease Process  [] Patient  []Family   Requires Further Counseling Coping with Disease Process  [] Patient  []Family     Identified Projected Needs:    Home Health Aid  [] Yes  [x]No   Transportation  [] Yes  [x]No   Education  [] Yes  [x]No        Specific Education     Financial Counseling  [] Yes  [x]No   Inability to Care for Self/Will Require 24 hour care  [] Yes  [x]No   Pain Management  [] Yes  [x]No   Home Infusion Therapy  [] Yes  [x]No   Oxygen Therapy  [] Yes  [x]No   DME  [] Yes  [x]No   Long Term Care Placement [] Yes  [x]No   Rehab  [] Yes  [x]No   Physical Therapy  [] Yes  [x]No   Needs Anticipated At This Time  [] Yes  [x]No     Intra-Hospital Referral:    [facility]  [] Yes  [x]No     [] Yes  [x]No   Patient Representative  [] Yes  [x]No   Staff for Teaching Needs  [] Yes  [x]No   Specialty Teaching Needs     Diabetic Educator  [] Yes  [x]No   Referral for Diabetic Educator Needed  [] Yes  [x]No  If Yes, place order for Nutritionist or Diabetic Consult     Tentative Discharge Plan:    Home with No Services  [x] Yes  []No   Home with [address]  [] Yes  [x]No        If Yes, specify type    Home Care Program  [] Yes  [x]No        If Yes, specify type    Meals on Wheels  [] Yes  [x]No   Office of Aging  [] Yes  [x]No   NHP  [] Yes  [x]No   Return to the Nursing Home  [] Yes  [x]No   Rehab Therapy  [] Yes  [x]No   Acute Rehab  [] Yes  [x]No   Subacute Rehab  [] Yes  [x]No   Private Care  [] Yes  [x]No   Substance Abuse Referral  [] Yes  [x]No   Transportation  [] Yes  [x]No   Chore Service  [] Yes  [x]No   Inpatient Hospice  [] Yes  [x]No   OP RT  [] Yes  [x] No   OP Hemo  [] Yes  [x] No   OP PT  [] Yes  [x]No   Support Group  [] Yes  [x]No   Reach to Recovery  [] Yes  [x]No   OP Oncology Clinic  [] Yes  [x]No   Clinic Appointment  [] Yes  [x]No   DME  [] Yes  [x]No   Comments    Name of D/C Planner or  Given to Patient or Family Adriana Ash. Cleora Schirmer RN   Phone Number         Extension [phone]   Date 03/03/17   Time    If you are discharged home, whom do you designate to participate in your discharge plan and receive any information needed?      Enter name of designee  and Mother        Phone # of designee [phone]        Address of designee         Updated         Patient refused to designate any           individual

## 2017-03-03 NOTE — PROGRESS NOTES
Received pt in bed HOB elevated. Alert and oriented X 4. She denies chest pain, SOB, numbness or tingling. Complained of pain 8/10. LR @ 150 ml/hr infusing to left arm # 20 gauge. No s/s of infiltration. Noted  Midline abdominal staples intact. Pt remains NPO. Call bell within reach  Monitoring ongoing. 2200 Pt recived B/P medications for b/p 154/100.    0049 B/P recheck 136/88. Prn pain medication given. Pt resting quietly. Monitoring ongoing. 0431 Pt ambulating to bathroom and voiding without difficulty, Pt complained of pain 8/10 received PRN pain medication. Monitoring ongoing.

## 2017-03-03 NOTE — ROUTINE PROCESS
Priscilla MIRANDA concerning patient's /100. Per patient she hasn't taken any of her BP med today. Spoke to DR. Hellen Puga. Per MD, it is okay to administer pt's BP meds now. Rechecked BP in am and if still high pt's daily BP med can be administered.

## 2017-03-03 NOTE — ACP (ADVANCE CARE PLANNING)
Patient has designated ______Husband Lexii Dahl  [phone] and her mother, Abbe Fay [phone]__________________ to participate in his/her discharge plan and to receive any needed information.      Name:  and mother  Address:same as pt  Phone number:see above

## 2017-03-03 NOTE — PROGRESS NOTES
6338 patient received liquid tray , instructed to eat slowly, call bell within reach,no distress noted    0926 patient tolerating diet well , no c/o N&V, itching resolved, no distress noted patient in bed, given items for hygiene care     1149 bg results 100 , patient's  assisted patient with am care, no distress noted    1449 patient in bed,  Pain med given, patient tolerating diet well, no c/o N&V, no distress noted, call bell within reach, linens changed per patient;'s request, scds in place     1935 Bedside and Verbal shift change report given to Koby Cruz (oncoming nurse) by Amari Velazquez RN (offgoing nurse). Report included the following information SBAR, Kardex and MAR.

## 2017-03-03 NOTE — PROGRESS NOTES
Patient seen and examined. Feeling better. No fever ior chills. Abdominal pain is better. No nausea or vomiting since admission. Passing flatus.     Plan:  Allow liquid diet  Continue with IV zosyn (Though no increase WBC and no fever) for now  Ambulation  IV reglan and Zofran  Ambulation  Repeat CBC tomorrow

## 2017-03-03 NOTE — PROGRESS NOTES
Bedside and Verbal shift change report given to Ella Robledo RN (oncoming nurse) by Lennie Keane RN (offgoing nurse). Report included the following information SBAR, Kardex, Intake/Output, MAR and Recent Results.

## 2017-03-03 NOTE — PROGRESS NOTES
[facility] Dosing Services     Pharmacy adjusting dose for Piperacillin-Tazobactam (Zosyn) per renal and extended infusion protocol. Was on a regimen of: 3.375 gm IV q6h    Labs:   Serum Creatinine/CrCl: 1.02 mg/dl/58 ml/min    Plan:  Changed Zosyn to 3.375 gm IV q8h extended 4 hours infusion. Pharmacy to follow and will redose based on renal function changes. Thanks.

## 2017-03-03 NOTE — PROGRESS NOTES
1711  Received from ER, no order, was told by ER that Dr Dayanna Parmar been notified, pt no pain and no nausea at this time, NPO maintained. Ahuja with in reach. Carlos A 27 Latoya Luna notified Dr Marissa Brush for order, new order given.

## 2017-03-03 NOTE — PROGRESS NOTES
Certified [facility] provider rounded on [address] to provide education related to sleep apnea after chart review for risk factors. Risk factors include:  1. HTN  2. GERD  3. Diabetes  4. Mallampati 3  5. STOP BANG score 4  6. [place] Sleepiness score 8    Provided patient with the following pamphlets:  1. What is Sleep Apnea  2. Sleep and Medical problems  3. Your Heart and Sleep Apnea    Patient Education: [facility] provider spoke to patient about her risk factors. Patient was ok with note being sent to PCP, to trigger a conversation after discharge. Recommendations:  1. Order PSG testing to be arranged as an outpatient.

## 2017-03-03 NOTE — ROUTINE PROCESS
Bedside and Verbal shift change report given to Nely Larson   (oncoming nurse) by Aminta Dawn RN   (offgoing nurse). Report included the following information SBAR, Kardex, Intake/Output, MAR and Recent Results.

## 2017-03-04 VITALS
RESPIRATION RATE: 18 BRPM | TEMPERATURE: 97.7 F | BODY MASS INDEX: 25.95 KG/M2 | HEIGHT: 64 IN | WEIGHT: 152 LBS | DIASTOLIC BLOOD PRESSURE: 90 MMHG | SYSTOLIC BLOOD PRESSURE: 141 MMHG | HEART RATE: 86 BPM | OXYGEN SATURATION: 96 %

## 2017-03-04 LAB
GLUCOSE BLD STRIP.AUTO-MCNC: 113 MG/DL (ref 70–110)
GLUCOSE BLD STRIP.AUTO-MCNC: 122 MG/DL (ref 70–110)

## 2017-03-04 PROCEDURE — 74011250637 HC RX REV CODE- 250/637: Performed by: SURGERY

## 2017-03-04 PROCEDURE — 74011000250 HC RX REV CODE- 250: Performed by: SURGERY

## 2017-03-04 PROCEDURE — 74011250637 HC RX REV CODE- 250/637: Performed by: SPECIALIST

## 2017-03-04 PROCEDURE — 82962 GLUCOSE BLOOD TEST: CPT

## 2017-03-04 PROCEDURE — C9113 INJ PANTOPRAZOLE SODIUM, VIA: HCPCS | Performed by: SURGERY

## 2017-03-04 PROCEDURE — 74011000258 HC RX REV CODE- 258: Performed by: SURGERY

## 2017-03-04 PROCEDURE — 77030020255 HC SOL INJ LR 1000ML BG

## 2017-03-04 PROCEDURE — 74011250636 HC RX REV CODE- 250/636: Performed by: SURGERY

## 2017-03-04 RX ORDER — OXYCODONE AND ACETAMINOPHEN 5; 325 MG/1; MG/1
1-2 TABLET ORAL
Qty: 30 TAB | Refills: 0 | Status: SHIPPED | OUTPATIENT
Start: 2017-03-04 | End: 2017-03-14

## 2017-03-04 RX ORDER — ONDANSETRON 4 MG/1
4 TABLET, FILM COATED ORAL
Qty: 30 TAB | Refills: 1 | Status: SHIPPED | OUTPATIENT
Start: 2017-03-04 | End: 2017-04-18

## 2017-03-04 RX ADMIN — HYDROMORPHONE HYDROCHLORIDE 0.5 MG: 1 INJECTION, SOLUTION INTRAMUSCULAR; INTRAVENOUS; SUBCUTANEOUS at 11:22

## 2017-03-04 RX ADMIN — SODIUM CHLORIDE, SODIUM LACTATE, POTASSIUM CHLORIDE, AND CALCIUM CHLORIDE 150 ML/HR: 600; 310; 30; 20 INJECTION, SOLUTION INTRAVENOUS at 02:24

## 2017-03-04 RX ADMIN — PIPERACILLIN SODIUM,TAZOBACTAM SODIUM 3.38 G: 3; .375 INJECTION, POWDER, FOR SOLUTION INTRAVENOUS at 00:22

## 2017-03-04 RX ADMIN — SODIUM CHLORIDE 20 MG: 9 INJECTION INTRAMUSCULAR; INTRAVENOUS; SUBCUTANEOUS at 09:03

## 2017-03-04 RX ADMIN — AMLODIPINE BESYLATE 2.5 MG: 2.5 TABLET ORAL at 09:02

## 2017-03-04 RX ADMIN — DIPHENHYDRAMINE HYDROCHLORIDE 25 MG: 25 CAPSULE ORAL at 00:22

## 2017-03-04 RX ADMIN — SODIUM CHLORIDE, SODIUM LACTATE, POTASSIUM CHLORIDE, AND CALCIUM CHLORIDE 150 ML/HR: 600; 310; 30; 20 INJECTION, SOLUTION INTRAVENOUS at 09:00

## 2017-03-04 RX ADMIN — DIPHENHYDRAMINE HYDROCHLORIDE 25 MG: 25 CAPSULE ORAL at 11:23

## 2017-03-04 RX ADMIN — PIPERACILLIN SODIUM,TAZOBACTAM SODIUM 3.38 G: 3; .375 INJECTION, POWDER, FOR SOLUTION INTRAVENOUS at 09:03

## 2017-03-04 RX ADMIN — METOCLOPRAMIDE 10 MG: 5 INJECTION, SOLUTION INTRAMUSCULAR; INTRAVENOUS at 09:02

## 2017-03-04 RX ADMIN — HYDROMORPHONE HYDROCHLORIDE 0.5 MG: 1 INJECTION, SOLUTION INTRAMUSCULAR; INTRAVENOUS; SUBCUTANEOUS at 06:16

## 2017-03-04 RX ADMIN — DIPHENHYDRAMINE HYDROCHLORIDE 25 MG: 25 CAPSULE ORAL at 06:15

## 2017-03-04 RX ADMIN — BISOPROLOL FUMARATE AND HYDROCHLOROTHIAZIDE 1 TABLET: 6.25; 2.5 TABLET ORAL at 09:02

## 2017-03-04 RX ADMIN — METOCLOPRAMIDE 10 MG: 5 INJECTION, SOLUTION INTRAMUSCULAR; INTRAVENOUS at 02:24

## 2017-03-04 RX ADMIN — HYDROMORPHONE HYDROCHLORIDE 0.5 MG: 1 INJECTION, SOLUTION INTRAMUSCULAR; INTRAVENOUS; SUBCUTANEOUS at 03:28

## 2017-03-04 NOTE — PROGRESS NOTES
Received bedside verbal report from [facility]. Patient is in bed,bed at the lowest position,call bell within reach,white board updated. 0830 Morning assessment done,mo any complain of nausea and vomiting.    0900 Morning med given,helped pt to go to the bathroom,makes her comfortable. 1015 Pt resting in bed,will continue to monitor her.

## 2017-03-04 NOTE — DISCHARGE INSTRUCTIONS
DISCHARGE SUMMARY from Nurse    The following personal items are in your possession at time of discharge:    Dental Appliances: None        Home Medications: None  Jewelry: None  Clothing: None  Other Valuables: Eyeglasses             PATIENT INSTRUCTIONS:    After general anesthesia or intravenous sedation, for 24 hours or while taking prescription Narcotics:  · Limit your activities  · Do not drive and operate hazardous machinery  · Do not make important personal or business decisions  · Do  not drink alcoholic beverages  · If you have not urinated within 8 hours after discharge, please contact your surgeon on call. Report the following to your surgeon:  · Excessive pain, swelling, redness or odor of or around the surgical area  · Temperature over 100.5  · Nausea and vomiting lasting longer than 4 hours or if unable to take medications  · Any signs of decreased circulation or nerve impairment to extremity: change in color, persistent  numbness, tingling, coldness or increase pain  · Any questions        What to do at Home:  Recommended activity: Activity as tolerated and no driving for today, ambulate in house, no lifting, driving, or strenuous exercise,no driving while on analgesics or until it's ok by Dr Shadia Ahuja. If you experience any of the following symptoms fever greater than 101 degrees or more, uncontrolled pain, uncontrolled nausea/vomiting, any new numbness or tingling, inability to urinate for 8 or more hours, any signs of wound infection (drainage, odor, warmth, swelling), or any other questions  please follow up with Dr Shadia Ahuja. *  Please give a list of your current medications to your Primary Care Provider. *  Please update this list whenever your medications are discontinued, doses are      changed, or new medications (including over-the-counter products) are added. *  Please carry medication information at all times in case of emergency situations.           These are general instructions for a healthy lifestyle:    No smoking/ No tobacco products/ Avoid exposure to second hand smoke    Surgeon General's Warning:  Quitting smoking now greatly reduces serious risk to your health. Obesity, smoking, and sedentary lifestyle greatly increases your risk for illness    A healthy diet, regular physical exercise & weight monitoring are important for maintaining a healthy lifestyle    You may be retaining fluid if you have a history of heart failure or if you experience any of the following symptoms:  Weight gain of 3 pounds or more overnight or 5 pounds in a week, increased swelling in our hands or feet or shortness of breath while lying flat in bed. Please call your doctor as soon as you notice any of these symptoms; do not wait until your next office visit. Recognize signs and symptoms of STROKE:    F-face looks uneven    A-arms unable to move or move unevenly    S-speech slurred or non-existent    T-time-call 911 as soon as signs and symptoms begin-DO NOT go       Back to bed or wait to see if you get better-TIME IS BRAIN. Warning Signs of HEART ATTACK     Call 911 if you have these symptoms:   Chest discomfort. Most heart attacks involve discomfort in the center of the chest that lasts more than a few minutes, or that goes away and comes back. It can feel like uncomfortable pressure, squeezing, fullness, or pain.  Discomfort in other areas of the upper body. Symptoms can include pain or discomfort in one or both arms, the back, neck, jaw, or stomach.  Shortness of breath with or without chest discomfort.  Other signs may include breaking out in a cold sweat, nausea, or lightheadedness. Don't wait more than five minutes to call 911 - MINUTES MATTER! Fast action can save your life. Calling 911 is almost always the fastest way to get lifesaving treatment.  Emergency Medical Services staff can begin treatment when they arrive -- up to an hour sooner than if someone gets to the hospital by car. The discharge information has been reviewed with the patient. The patient verbalized understanding. Discharge medications reviewed with the patient and appropriate educational materials and side effects teaching were provided.     Patient armband removed and shredded

## 2017-03-04 NOTE — ROUTINE PROCESS
Bedside and Verbal shift change report given to Jenaro Mayo RN  (oncoming nurse) by Nel Mcclelland RN   (offgoing nurse). Report included the following information SBAR, Kardex and MAR.

## 2017-03-04 NOTE — PROGRESS NOTES
Inpatient Daily Progress Note    Subjective:    Patient states she is completely improved. She has no nausea, vomiting or pain. She is tolerating general diet without any difficulty. She wishes to go home. Objective:     Physical Exam:  Visit Vitals    /90 (BP 1 Location: Right arm, BP Patient Position: At rest)    Pulse 86    Temp 97.7 °F (36.5 °C)    Resp 18    Ht 5' 4\" (1.626 m)    Wt 68.9 kg (152 lb)    SpO2 96%    BMI 26.09 kg/m2       Gen: Well developed, well nourished 62 y.o. female in no acute distress  Resp: clear to auscultation bilaterally, no wheezes   Cardio: Regular rate and rhythm, no murmurs, clicks, gallops or rubs, no edema  Abdomen: soft, nontender, nondistended, active bowel sounds, no hernias, staples in place  Psych: alert and oriented to person, place and time      Recent Results (from the past 12 hour(s))   GLUCOSE, POC    Collection Time: 03/03/17 10:36 PM   Result Value Ref Range    Glucose (POC) 143 (H) 70 - 110 mg/dL   GLUCOSE, POC    Collection Time: 03/04/17  6:25 AM   Result Value Ref Range    Glucose (POC) 113 (H) 70 - 110 mg/dL       Assessment:     Chery Jesus is a 62 y.o. female readmitted after small bowel resection for nausea vomiting and abdominal pain. Her symptoms are completely resolved, she is tolerating diet and having regular BM. She is stable for discharge. Plan:     -discharge home  -f/u with Dr Rebecca Beauchamp this week.

## 2017-03-04 NOTE — PROGRESS NOTES
attempted to conduct an initial consultation and spiritual assessment for Yumiko Stephenson, who is a 62 y.o.,female. However, patient was busy entertaining a room full of visitors, and her door was only slightly open. Patients primary language is: [place]. According to the patients EMR, her Spiritism affiliation is: [facility].     The  provided the following interventions:  Offered silent prayer on patient's behalf. Reviewed chart. Plan:  Chaplains will continue our attempts to connect with patient and then provide pastoral care on an as-needed/requested basis.     [facility]  Board Certified [facility][note 3]0 [facility],[address] [place] [address]    [phone]

## 2017-03-07 NOTE — ANCILLARY DISCHARGE INSTRUCTIONS
[facility]  Discharge Phone Call       After-Care Discharge Phone Call Questions:    Were you able to get your prescriptions filled? Comment:      [x] Yes  []No    Comment if answer is \"No\"   Are you taking your medication(s) as your doctor ordered? Do you understand the purpose of your medications? Comment:    [x] Yes  []No    Comment if answer is \"No\"   Are you taking any other medications that are not on the list?  Comment:      [x] Yes  []No    Comment if answer is \"Yes\"   Do you have any questions about your medications? Comment:    [] Yes  [x]No    Comment if answer is \"Yes\"   Did you make your follow-up appointments (if the hospital did not do this before  discharge)? Comment:    [x] Yes  []No    Comment if answer is \"No\"   Is there any reason you might not be able to keep your follow-up appointments? Comment:     [] Yes  [x]No    Comment if answer is \"Yes\"   Do you have any questions about your care plan? Comment:    [] Yes  [x]No    Comment if answer is \"Yes\"   Do you have a good understanding of how you should manage your health? Comment:    [x] Yes  []No    Comment if answer is \"Yes\"   Do you know which symptoms to watch for that would mean you would need to call your doctor right away? Comment:      [x] Yes  []No    Comment if answer is \"No\"   Do you have any questions about the follow up process or any instructions that we have provided? Comment:    [] Yes  [x]No    Comment if answer is \"Yes\"   Did staff take your preferences into account?

## 2017-03-08 ENCOUNTER — OFFICE VISIT (OUTPATIENT)
Dept: SURGERY | Age: 57
End: 2017-03-08

## 2017-03-08 VITALS
DIASTOLIC BLOOD PRESSURE: 88 MMHG | RESPIRATION RATE: 16 BRPM | BODY MASS INDEX: 26.56 KG/M2 | HEART RATE: 80 BPM | TEMPERATURE: 98.4 F | HEIGHT: 64 IN | WEIGHT: 155.6 LBS | OXYGEN SATURATION: 100 % | SYSTOLIC BLOOD PRESSURE: 136 MMHG

## 2017-03-08 DIAGNOSIS — Z09 POSTOPERATIVE EXAMINATION: Primary | ICD-10-CM

## 2017-03-08 LAB
BACTERIA SPEC CULT: NORMAL
BACTERIA SPEC CULT: NORMAL
SERVICE CMNT-IMP: NORMAL
SERVICE CMNT-IMP: NORMAL

## 2017-03-08 NOTE — PROGRESS NOTES
Patient seen and examined. Doing relatively well. Was passing flatus and stool daily until yesterday. She said she had some abdominal pain yesterday but denies any nausea or vomiting. She actually had a large meal at dinner yesterday. On exam her abdomen is mildly tender, but no guarding or rebound.    Plan:  Follow up with me in 2 weeks  Follow up with PCP  GI input (seen by Dr. Nuha Ramirez before)

## 2017-03-08 NOTE — MR AVS SNAPSHOT
Visit Information Date & Time Provider Department Dept. Phone Encounter #  
 3/8/2017  8:15 AM MD [facility] [phone] [id number] Your Appointments 3/13/2017  9:00 AM  
POST OP with Eron Biggs MD  
[address] [facility]) Appt Note: 2 week follow up  
 [address] [place] [address]  
  
   
 [address] [place] [address] Upcoming Health Maintenance Date Due Hepatitis C Screening 1960 LIPID PANEL Q1 1960 FOOT EXAM Q1 2/4/1970 MICROALBUMIN Q1 2/4/1970 EYE EXAM RETINAL OR DILATED Q1 2/4/1970 Pneumococcal 19-64 Medium Risk (1 of 1 - PPSV23) 2/4/1979 DTaP/Tdap/Td series (1 - Tdap) 2/4/1981 PAP AKA CERVICAL CYTOLOGY 2/4/1981 FOBT Q 1 YEAR AGE 50-75 2/4/2010 HEMOGLOBIN A1C Q6M 12/27/2016 BREAST CANCER SCRN MAMMOGRAM 1/6/2019 Allergies as of 3/8/2017  Review Complete On: 3/8/2017 By: Jenelle Or Severity Noted Reaction Type Reactions Macrodantin [Nitrofurantoin Macrocrystalline]  01/31/2012    Itching Tape [Adhesive]  09/30/2014    Other (comments) Burned skin when had wound vac Current Immunizations  Reviewed on 2/22/2017 Name Date Influenza Vaccine 11/15/2016 Not reviewed this visit Vitals BP Pulse Temp Resp Height(growth percentile) Weight(growth percentile) 136/88 (BP 1 Location: Right arm, BP Patient Position: Sitting) 80 98.4 °F (36.9 °C) (Oral) 16 5' 4\" (1.626 m) 155 lb 9.6 oz (70.6 kg) SpO2 BMI OB Status Smoking Status 100% 26.71 kg/m2 Hysterectomy Never Smoker BMI and BSA Data Body Mass Index Body Surface Area  
 26.71 kg/m 2 1.79 m 2 Preferred Pharmacy Pharmacy Name Phone [facility] [address]  [facility] [phone] Your Updated Medication List  
  
   
This list is accurate as of: 3/8/17  8:59 AM.  Always use your most recent med list.  
  
  
  
  
 albuterol 90 mcg/actuation inhaler Commonly known as:  PROVENTIL HFA, VENTOLIN HFA, PROAIR HFA Take 1 Puff by inhalation every six (6) hours as needed for Wheezing. amLODIPine 2.5 mg tablet Commonly known as:  Paradox Mend Take 2.5 mg by mouth nightly. docusate sodium 100 mg capsule Commonly known as:  Chelo Neighbor Take 1 Cap by mouth two (2) times a day for 90 days. FARXIGA 10 mg Tab Generic drug:  dapagliflozin Take  by mouth daily. Indications: type 2 diabetes mellitus GLUMETZA 500 mg Tg24 24 hour tablet Generic drug:  metFORMIN Take 500 mg by mouth two (2) times a day. LIPITOR 20 mg tablet Generic drug:  atorvastatin Take 40 mg by mouth daily. omeprazole 10 mg capsule Commonly known as:  PRILOSEC Take 10 mg by mouth daily. * ondansetron hcl 4 mg tablet Commonly known as:  ZOFRAN (AS HYDROCHLORIDE) Take 1 Tab by mouth every eight (8) hours as needed for Nausea. * ondansetron hcl 4 mg tablet Commonly known as:  ZOFRAN (AS HYDROCHLORIDE) Take 1 Tab by mouth every eight (8) hours as needed for Nausea. oxyCODONE-acetaminophen 5-325 mg per tablet Commonly known as:  PERCOCET Take 1-2 Tabs by mouth every four (4) hours as needed for Pain. Max Daily Amount: 12 Tabs. ZIAC 2.5-6.25 mg per tablet Generic drug:  bisoprolol-hydroCHLOROthiazide Take 1 Tab by mouth daily. * Notice: This list has 2 medication(s) that are the same as other medications prescribed for you. Read the directions carefully, and ask your doctor or other care provider to review them with you. Patient Instructions If you have any questions or concerns about today's appointment, the verbal and/or written instructions you were given for follow up care, please call our office at [phone]. [employer] [facility] [place] [address] [facility] [place], [address] 
 
[phone] office [phone] Introducing [facility]! [employer] introduces Geli[employer] patient portal. Now you can access parts of your medical record, email your doctor's office, and request medication refills online. 1. In your internet browser, go to [email or web address] 2. Click on the First Time User? Click Here link in the Sign In box. You will see the New Member Sign Up page. 3. Enter your Geliyoo Access Code exactly as it appears below. You will not need to use this code after youve completed the sign-up process. If you do not sign up before the expiration date, you must request a new code. · Geliyoo Access Code: [email or web address] Expires: 4/22/2017  1:41 PM 
 
4. Enter the last four digits of your Social Security Number (xxxx) and Date of Birth (mm/dd/yyyy) as indicated and click Submit. You will be taken to the next sign-up page. 5. Create a Geliyoo ID. This will be your Geliyoo login ID and cannot be changed, so think of one that is secure and easy to remember. 6. Create a Geliyoo password. You can change your password at any time. 7. Enter your Password Reset Question and Answer. This can be used at a later time if you forget your password. 8. Enter your e-mail address. You will receive e-mail notification when new information is available in [address] 9. Click Sign Up. You can now view and download portions of your medical record. 10. Click the Download Summary menu link to download a portable copy of your medical information. If you have questions, please visit the Frequently Asked Questions section of the Geliyoo website. Remember, Geliyoo is NOT to be used for urgent needs. For medical emergencies, dial 911. Now available from your iPhone and Android! Please provide this summary of care documentation to your next provider. Your primary care clinician is listed as N Candance [facility]. If you have any questions after today's visit, please call [phone].

## 2017-03-08 NOTE — PATIENT INSTRUCTIONS
If you have any questions or concerns about today's appointment, the verbal and/or written instructions you were given for follow up care, please call our office at [phone].     [facility] - [address]    [phone] office  [phone]

## 2017-03-08 NOTE — PROGRESS NOTES
Yumiko Stephenson is a 62 y.o. female who presents today for a post-op exam for an exploratory laparotomy, extensive lysis of adhesions, & small bowel resection with primary anastomosis performed on 02/21/17. Patient scores their post-op pain level on a pain scale from 1-10  as a 5 today. 1. Have you been to the ER, urgent care clinic since your last visit? Hospitalized since your last visit? Yes, C-ED on 03/02/17 for post-op complications. 2. Have you seen or consulted any other health care providers outside of the [address] [place] [address] since your last visit? Include any pap smears or colon screening.  No

## 2017-03-11 NOTE — DISCHARGE SUMMARY
Patient presented with abdominal pain. A CT scan in ER revealed no bowel obstruction but a 5 cm collection in the pelvis. She had no fever. Her WBC was normal.  She was admitted to the floor, kept NPO and started on IV antibiotics for her collection. The next day she was started on liquid diet. She was passing flatus and stool. Her pain is well controlled. She was discharged home on regular diet.

## 2017-03-14 ENCOUNTER — APPOINTMENT (OUTPATIENT)
Dept: CT IMAGING | Age: 57
DRG: 392 | End: 2017-03-14
Attending: PHYSICIAN ASSISTANT
Payer: COMMERCIAL

## 2017-03-14 ENCOUNTER — HOSPITAL ENCOUNTER (INPATIENT)
Age: 57
LOS: 2 days | Discharge: HOME | DRG: 392 | End: 2017-03-16
Attending: EMERGENCY MEDICINE | Admitting: FAMILY MEDICINE
Payer: COMMERCIAL

## 2017-03-14 DIAGNOSIS — R10.84 ABDOMINAL PAIN, GENERALIZED: Primary | ICD-10-CM

## 2017-03-14 PROBLEM — G89.18 POST-OPERATIVE PAIN: Status: ACTIVE | Noted: 2017-03-14

## 2017-03-14 PROBLEM — R10.9 ABDOMINAL PAIN: Status: ACTIVE | Noted: 2017-03-14

## 2017-03-14 LAB
ALBUMIN SERPL BCP-MCNC: 3.4 G/DL (ref 3.4–5)
ALBUMIN/GLOB SERPL: 0.9 {RATIO} (ref 0.8–1.7)
ALP SERPL-CCNC: 122 U/L (ref 45–117)
ALT SERPL-CCNC: 14 U/L (ref 13–56)
ANION GAP BLD CALC-SCNC: 7 MMOL/L (ref 3–18)
APPEARANCE UR: CLEAR
AST SERPL W P-5'-P-CCNC: 12 U/L (ref 15–37)
BACTERIA URNS QL MICRO: NEGATIVE /HPF
BASOPHILS # BLD AUTO: 0 K/UL (ref 0–0.06)
BASOPHILS # BLD: 0 % (ref 0–2)
BILIRUB SERPL-MCNC: 0.2 MG/DL (ref 0.2–1)
BILIRUB UR QL: NEGATIVE
BUN SERPL-MCNC: 19 MG/DL (ref 7–18)
BUN/CREAT SERPL: 18 (ref 12–20)
CALCIUM SERPL-MCNC: 9.6 MG/DL (ref 8.5–10.1)
CHLORIDE SERPL-SCNC: 104 MMOL/L (ref 100–108)
CO2 SERPL-SCNC: 29 MMOL/L (ref 21–32)
COLOR UR: YELLOW
CREAT SERPL-MCNC: 1.06 MG/DL (ref 0.6–1.3)
DIFFERENTIAL METHOD BLD: NORMAL
EOSINOPHIL # BLD: 0.2 K/UL (ref 0–0.4)
EOSINOPHIL NFR BLD: 3 % (ref 0–5)
EPITH CASTS URNS QL MICRO: NORMAL /LPF (ref 0–5)
ERYTHROCYTE [DISTWIDTH] IN BLOOD BY AUTOMATED COUNT: 13 % (ref 11.6–14.5)
GLOBULIN SER CALC-MCNC: 3.7 G/DL (ref 2–4)
GLUCOSE SERPL-MCNC: 124 MG/DL (ref 74–99)
GLUCOSE UR STRIP.AUTO-MCNC: >1000 MG/DL
HCT VFR BLD AUTO: 38.7 % (ref 35–45)
HGB BLD-MCNC: 12.8 G/DL (ref 12–16)
HGB UR QL STRIP: ABNORMAL
KETONES UR QL STRIP.AUTO: ABNORMAL MG/DL
LACTATE BLD-SCNC: 0.8 MMOL/L (ref 0.4–2)
LEUKOCYTE ESTERASE UR QL STRIP.AUTO: NEGATIVE
LIPASE SERPL-CCNC: 260 U/L (ref 73–393)
LYMPHOCYTES # BLD AUTO: 36 % (ref 21–52)
LYMPHOCYTES # BLD: 2.1 K/UL (ref 0.9–3.6)
MCH RBC QN AUTO: 28.6 PG (ref 24–34)
MCHC RBC AUTO-ENTMCNC: 33.1 G/DL (ref 31–37)
MCV RBC AUTO: 86.6 FL (ref 74–97)
MONOCYTES # BLD: 0.5 K/UL (ref 0.05–1.2)
MONOCYTES NFR BLD AUTO: 9 % (ref 3–10)
NEUTS SEG # BLD: 3 K/UL (ref 1.8–8)
NEUTS SEG NFR BLD AUTO: 52 % (ref 40–73)
NITRITE UR QL STRIP.AUTO: NEGATIVE
PH UR STRIP: 5.5 [PH] (ref 5–8)
PLATELET # BLD AUTO: 353 K/UL (ref 135–420)
PMV BLD AUTO: 10.2 FL (ref 9.2–11.8)
POTASSIUM SERPL-SCNC: 3.9 MMOL/L (ref 3.5–5.5)
PROT SERPL-MCNC: 7.1 G/DL (ref 6.4–8.2)
PROT UR STRIP-MCNC: NEGATIVE MG/DL
RBC # BLD AUTO: 4.47 M/UL (ref 4.2–5.3)
RBC #/AREA URNS HPF: NORMAL /HPF (ref 0–5)
SODIUM SERPL-SCNC: 140 MMOL/L (ref 136–145)
SP GR UR REFRACTOMETRY: >1.03 (ref 1–1.03)
UROBILINOGEN UR QL STRIP.AUTO: 0.2 EU/DL (ref 0.2–1)
WBC # BLD AUTO: 5.9 K/UL (ref 4.6–13.2)
WBC URNS QL MICRO: NORMAL /HPF (ref 0–4)

## 2017-03-14 PROCEDURE — 96374 THER/PROPH/DIAG INJ IV PUSH: CPT

## 2017-03-14 PROCEDURE — 74011636320 HC RX REV CODE- 636/320: Performed by: EMERGENCY MEDICINE

## 2017-03-14 PROCEDURE — 65270000029 HC RM PRIVATE

## 2017-03-14 PROCEDURE — 96375 TX/PRO/DX INJ NEW DRUG ADDON: CPT

## 2017-03-14 PROCEDURE — 83690 ASSAY OF LIPASE: CPT | Performed by: PHYSICIAN ASSISTANT

## 2017-03-14 PROCEDURE — 74177 CT ABD & PELVIS W/CONTRAST: CPT

## 2017-03-14 PROCEDURE — 77030020263 HC SOL INJ SOD CL0.9% LFCR 1000ML

## 2017-03-14 PROCEDURE — 99284 EMERGENCY DEPT VISIT MOD MDM: CPT

## 2017-03-14 PROCEDURE — C9113 INJ PANTOPRAZOLE SODIUM, VIA: HCPCS | Performed by: FAMILY MEDICINE

## 2017-03-14 PROCEDURE — 83605 ASSAY OF LACTIC ACID: CPT

## 2017-03-14 PROCEDURE — 96361 HYDRATE IV INFUSION ADD-ON: CPT

## 2017-03-14 PROCEDURE — 86140 C-REACTIVE PROTEIN: CPT | Performed by: FAMILY MEDICINE

## 2017-03-14 PROCEDURE — 96376 TX/PRO/DX INJ SAME DRUG ADON: CPT

## 2017-03-14 PROCEDURE — 74011250636 HC RX REV CODE- 250/636: Performed by: FAMILY MEDICINE

## 2017-03-14 PROCEDURE — 80053 COMPREHEN METABOLIC PANEL: CPT | Performed by: PHYSICIAN ASSISTANT

## 2017-03-14 PROCEDURE — 74011000258 HC RX REV CODE- 258: Performed by: FAMILY MEDICINE

## 2017-03-14 PROCEDURE — 81001 URINALYSIS AUTO W/SCOPE: CPT | Performed by: PHYSICIAN ASSISTANT

## 2017-03-14 PROCEDURE — 74011250636 HC RX REV CODE- 250/636: Performed by: PHYSICIAN ASSISTANT

## 2017-03-14 PROCEDURE — 85025 COMPLETE CBC W/AUTO DIFF WBC: CPT | Performed by: PHYSICIAN ASSISTANT

## 2017-03-14 PROCEDURE — 36415 COLL VENOUS BLD VENIPUNCTURE: CPT | Performed by: FAMILY MEDICINE

## 2017-03-14 RX ORDER — HYDROMORPHONE HYDROCHLORIDE 1 MG/ML
1 INJECTION, SOLUTION INTRAMUSCULAR; INTRAVENOUS; SUBCUTANEOUS
Status: COMPLETED | OUTPATIENT
Start: 2017-03-14 | End: 2017-03-14

## 2017-03-14 RX ORDER — HYDROMORPHONE HYDROCHLORIDE 1 MG/ML
1 INJECTION, SOLUTION INTRAMUSCULAR; INTRAVENOUS; SUBCUTANEOUS
Status: DISCONTINUED | OUTPATIENT
Start: 2017-03-14 | End: 2017-03-16 | Stop reason: HOSPADM

## 2017-03-14 RX ORDER — HEPARIN SODIUM 5000 [USP'U]/ML
5000 INJECTION, SOLUTION INTRAVENOUS; SUBCUTANEOUS EVERY 8 HOURS
Status: DISCONTINUED | OUTPATIENT
Start: 2017-03-15 | End: 2017-03-16 | Stop reason: HOSPADM

## 2017-03-14 RX ORDER — ONDANSETRON 2 MG/ML
4 INJECTION INTRAMUSCULAR; INTRAVENOUS
Status: COMPLETED | OUTPATIENT
Start: 2017-03-14 | End: 2017-03-14

## 2017-03-14 RX ORDER — DEXTROSE 50 % IN WATER (D50W) INTRAVENOUS SYRINGE
25-50 AS NEEDED
Status: DISCONTINUED | OUTPATIENT
Start: 2017-03-14 | End: 2017-03-16 | Stop reason: HOSPADM

## 2017-03-14 RX ORDER — ALBUTEROL SULFATE 0.83 MG/ML
2.5 SOLUTION RESPIRATORY (INHALATION)
Status: DISCONTINUED | OUTPATIENT
Start: 2017-03-14 | End: 2017-03-16 | Stop reason: HOSPADM

## 2017-03-14 RX ORDER — ONDANSETRON 2 MG/ML
4 INJECTION INTRAMUSCULAR; INTRAVENOUS
Status: DISCONTINUED | OUTPATIENT
Start: 2017-03-14 | End: 2017-03-16 | Stop reason: HOSPADM

## 2017-03-14 RX ORDER — ALBUTEROL SULFATE 90 UG/1
1 AEROSOL, METERED RESPIRATORY (INHALATION)
Status: DISCONTINUED | OUTPATIENT
Start: 2017-03-14 | End: 2017-03-14 | Stop reason: CLARIF

## 2017-03-14 RX ORDER — SODIUM CHLORIDE 9 MG/ML
125 INJECTION, SOLUTION INTRAVENOUS CONTINUOUS
Status: DISCONTINUED | OUTPATIENT
Start: 2017-03-15 | End: 2017-03-16 | Stop reason: HOSPADM

## 2017-03-14 RX ORDER — INSULIN LISPRO 100 [IU]/ML
INJECTION, SOLUTION INTRAVENOUS; SUBCUTANEOUS EVERY 6 HOURS
Status: DISCONTINUED | OUTPATIENT
Start: 2017-03-15 | End: 2017-03-15

## 2017-03-14 RX ORDER — NALOXONE HYDROCHLORIDE 0.4 MG/ML
0.4 INJECTION, SOLUTION INTRAMUSCULAR; INTRAVENOUS; SUBCUTANEOUS AS NEEDED
Status: DISCONTINUED | OUTPATIENT
Start: 2017-03-14 | End: 2017-03-16 | Stop reason: HOSPADM

## 2017-03-14 RX ORDER — MAGNESIUM SULFATE 100 %
16 CRYSTALS MISCELLANEOUS AS NEEDED
Status: DISCONTINUED | OUTPATIENT
Start: 2017-03-14 | End: 2017-03-16 | Stop reason: HOSPADM

## 2017-03-14 RX ADMIN — HYDROMORPHONE HYDROCHLORIDE 1 MG: 1 INJECTION, SOLUTION INTRAMUSCULAR; INTRAVENOUS; SUBCUTANEOUS at 16:50

## 2017-03-14 RX ADMIN — IOPAMIDOL 100 ML: 612 INJECTION, SOLUTION INTRAVENOUS at 17:58

## 2017-03-14 RX ADMIN — SODIUM CHLORIDE 8 MG/HR: 900 INJECTION INTRAVENOUS at 23:32

## 2017-03-14 RX ADMIN — HEPARIN SODIUM 5000 UNITS: 5000 INJECTION, SOLUTION INTRAVENOUS; SUBCUTANEOUS at 23:35

## 2017-03-14 RX ADMIN — HYDROMORPHONE HYDROCHLORIDE 1 MG: 1 INJECTION, SOLUTION INTRAMUSCULAR; INTRAVENOUS; SUBCUTANEOUS at 19:03

## 2017-03-14 RX ADMIN — HYDROMORPHONE HYDROCHLORIDE 1 MG: 1 INJECTION, SOLUTION INTRAMUSCULAR; INTRAVENOUS; SUBCUTANEOUS at 23:32

## 2017-03-14 RX ADMIN — SODIUM CHLORIDE 1000 ML: 900 INJECTION, SOLUTION INTRAVENOUS at 16:50

## 2017-03-14 RX ADMIN — SODIUM CHLORIDE 125 ML/HR: 900 INJECTION, SOLUTION INTRAVENOUS at 23:32

## 2017-03-14 RX ADMIN — ONDANSETRON 4 MG: 2 SOLUTION INTRAMUSCULAR; INTRAVENOUS at 19:02

## 2017-03-14 RX ADMIN — ONDANSETRON 4 MG: 2 SOLUTION INTRAMUSCULAR; INTRAVENOUS at 16:50

## 2017-03-14 NOTE — ED TRIAGE NOTES
Abdominal pain, has abscess with recent admission for same. Out of pain meds.  Vomited small amount of brown stuff per patient

## 2017-03-14 NOTE — ED PROVIDER NOTES
Patient is a 62 y.o. female presenting with abdominal pain and bleeding. The history is provided by the patient. Abdominal Pain      Post-Op Problem   Associated symptoms include abdominal pain. Ivanna Munguia is a 62 y.o. female presents with abdominal pain, small amount of bleeding from surgical wound, vomiting that started yesterday. Pt has had recent abdominal surgery and has been seen for the same complaint last week. Denies fever or purulent drainage. States has not had a bowel movement in two days. Past Medical History:   Diagnosis Date    Abdominal adhesions     Anemia     Arthritis     all over the body    Asthma     Asthma     Diabetes ([facility] [address]     Diabetes mellitus     Dyskinesia     bilateral    Essential hypertension     GERD (gastroesophageal reflux disease)     Hypertension     Menopause     Stool color black        Past Surgical History:   Procedure Laterality Date    HX CHOLECYSTECTOMY  6/28/10    HX HERNIA REPAIR      HX HYSTERECTOMY      Fibroids    HX KNEE REPLACEMENT      HX OOPHORECTOMY      TISSUE LOCALIZATION-EXCISION           Family History:   Problem Relation Age of Onset    Hypertension Other      parent    Breast Cancer Other 21    Heart Disease Father     Hypertension Father     Hypertension Other      sibling    Diabetes Other      parent       Social History     Social History    Marital status:      Spouse name: N/A    Number of children: N/A    Years of education: N/A     Occupational History    Not on file.      Social History Main Topics    Smoking status: Never Smoker    Smokeless tobacco: Never Used    Alcohol use No    Drug use: No    Sexual activity: Yes     Partners: Male     Birth control/ protection: None     Other Topics Concern    Not on file     Social History Narrative         ALLERGIES: Macrodantin [nitrofurantoin macrocrystalline] and Tape [adhesive]    Review of Systems   Gastrointestinal: Positive for abdominal pain. Constitutional:  Denies malaise, fever, chills. Head:  Denies injury. Face:  Denies injury or pain. ENMT:  Denies sore throat. Neck:  Denies injury or pain. Chest:  Denies injury. Cardiac:  Denies chest pain or palpitations. Respiratory:  Denies cough, wheezing, difficulty breathing, shortness of breath. GI/ABD: Pain, nausea, vomiting  Denies injury,  distention,  diarrhea. :  Denies injury, pain, dysuria or urgency. Back:  Denies injury or pain. Pelvis:  Denies injury or pain. Extremity/MS:  Denies injury or pain. Neuro:  Denies headache, LOC, dizziness, neurologic symptoms/deficits/paresthesias. Skin: Denies injury, rash, itching or skin changes. Vitals:    03/14/17 1613   BP: 135/84   Pulse: 90   Resp: 17   Temp: 98.6 °F (37 °C)   SpO2: 100%   Weight: 71.2 kg (157 lb)            Physical Exam   Nursing note and vitals reviewed. CONSTITUTIONAL: Alert, in no apparent distress; well-developed; well-nourished. HEAD:  Normocephalic, atraumatic. EYES: PERRL; EOM's intact. ENTM: Nose: No rhinorrhea; Throat: mucous membranes moist. Posterior pharynx-normal.  Neck:  No JVD, supple without lymphadenopathy. RESP: Chest clear, equal breath sounds. CV: S1 and S2 WNL; No murmurs, gallops or rubs. GI: Abdomen soft and moderate generalized tenderness around surgical wound, no surrounding erythema, minimal blood drainage from staple removals points. +BS, NG, NR. No masses or organomegaly. Negative hemoccult, light brown stool. UPPER EXT:  Normal inspection. LOWER EXT: Normal inspection. NEURO: strength 5/5 and sym, sensation intact. SKIN: No rashes; Normal for age and stage. PSYCH:  Alert and oriented, normal affect.       MDM  Number of Diagnoses or Management Options  Abdominal pain, generalized:   Diagnosis management comments: DDx: gastroenteritis, GERD, hernia, hepatitis, pancreatitis, gallbladder etiology, constipation, adhesions, UTI, pyelo, kidney stones, STD,  Dfjo-Vpax-Fwgyau syndrome, preg, ectopic, ovarian cyst, ovarian torsion, tubo-ovarian abscess, appendicitis, diverticulitis, SBO, GI bleed, mesenteric ischemia, AAA, cardiac etiology, musculoskeletal pain/spasm, malignancy  IMPRESSION AND MEDICAL DECISION MAKING:  Based upon the patient's presentation with noted HPI and PE, along with the work up done in the emergency department, I believe that the patient has continued abdominal pain, nausea, and vomiting. Spoke with Hospitalist who agreed to admit Pt. Amount and/or Complexity of Data Reviewed  Clinical lab tests: ordered and reviewed  Tests in the radiology section of CPT®: ordered and reviewed (: Patient: Viri Loza  MRN: [id number]  Procedure: CTD - ABD PELV W CONT  Access#: [id number]    Findings:  1. Similar appearance of ventral subcutaneous fat stranding, mesenteric edema and stranding surrounding the small bowel anastomotic site. 2. Slight increase in duodenal bowel wall thickening since the previous CT without evidence of bowel obstruction; nonspecific but may reflect enteritis. 3. Trace pericardial fluid. 4. Mildly patulous distal esophagus versus small hiatal hernia. 5. Stable left adnexal fluid cystic structure, likely simple ovarian cyst.  6. Similar left hydronephrosis/pelviectasis, left adrenal thickening as discussed on prior CT 3/2/17. 7. Hepatic steatosis. 8. Patient is s/p cholecystectomy, hysterectomy and right oophorectomy, hernia repair. These findings were discussed with the senior radiology resident prior to placing wet read.     )  Tests in the medicine section of CPT®: ordered and reviewed  Review and summarize past medical records: yes  Discuss the patient with other providers: yes (Spoke with Dr. Carlee Wall who advised Pt should be admitted    Spoke with Dr. Dexter Shelby who recommended check hemoccult. States would consult on Pt if admitted. Spoke with Hospitalist who agreed to admit Pt. Requested Lactic acid.)  Independent visualization of images, tracings, or specimens: yes      ED Course       Procedures    Vitals:  Patient Vitals for the past 12 hrs:   Temp Pulse Resp BP SpO2   03/14/17 1613 98.6 °F (37 °C) 90 17 135/84 100 %         Medications ordered:   Medications   HYDROmorphone (PF) (DILAUDID) injection 1 mg (1 mg IntraVENous Given 3/14/17 1650)   ondansetron (ZOFRAN) injection 4 mg (4 mg IntraVENous Given 3/14/17 1650)   sodium chloride 0.9 % bolus infusion 1,000 mL (1,000 mL IntraVENous New Bag 3/14/17 1650)   iopamidol (ISOVUE 300) 61 % contrast injection 115 mL (100 mL IntraVENous Given 3/14/17 1758)         Lab findings:  Recent Results (from the past 12 hour(s))   URINALYSIS W/ RFLX MICROSCOPIC    Collection Time: 03/14/17  4:30 PM   Result Value Ref Range    Color YELLOW      Appearance CLEAR      Specific gravity >1.030 (H) 1.005 - 1.030    pH (UA) 5.5 5.0 - 8.0      Protein NEGATIVE  NEG mg/dL    Glucose >1000 (A) NEG mg/dL    Ketone TRACE (A) NEG mg/dL    Bilirubin NEGATIVE  NEG      Blood LARGE (A) NEG      Urobilinogen 0.2 0.2 - 1.0 EU/dL    Nitrites NEGATIVE  NEG      Leukocyte Esterase NEGATIVE  NEG     URINE MICROSCOPIC ONLY    Collection Time: 03/14/17  4:30 PM   Result Value Ref Range    WBC 0 to 3 0 - 4 /hpf    RBC 11 to 20 0 - 5 /hpf    Epithelial cells 1+ 0 - 5 /lpf    Bacteria NEGATIVE  NEG /hpf   CBC WITH AUTOMATED DIFF    Collection Time: 03/14/17  4:45 PM   Result Value Ref Range    WBC 5.9 4.6 - 13.2 K/uL    RBC 4.47 4.20 - 5.30 M/uL    HGB 12.8 12.0 - 16.0 g/dL    HCT 38.7 35.0 - 45.0 %    MCV 86.6 74.0 - 97.0 FL    MCH 28.6 24.0 - 34.0 PG    MCHC 33.1 31.0 - 37.0 g/dL    RDW 13.0 11.6 - 14.5 %    PLATELET 094 076 - 926 K/uL    MPV 10.2 9.2 - 11.8 FL    NEUTROPHILS 52 40 - 73 %    LYMPHOCYTES 36 21 - 52 %    MONOCYTES 9 3 - 10 %    EOSINOPHILS 3 0 - 5 %    BASOPHILS 0 0 - 2 %    ABS. NEUTROPHILS 3.0 1.8 - 8.0 K/UL    ABS. LYMPHOCYTES 2.1 0.9 - 3.6 K/UL    ABS. MONOCYTES 0.5 0.05 - 1.2 K/UL    ABS. EOSINOPHILS 0.2 0.0 - 0.4 K/UL    ABS. BASOPHILS 0.0 0.0 - 0.06 K/UL    DF AUTOMATED     LIPASE    Collection Time: 03/14/17  4:45 PM   Result Value Ref Range    Lipase 260 73 - 213 U/L   METABOLIC PANEL, COMPREHENSIVE    Collection Time: 03/14/17  4:45 PM   Result Value Ref Range    Sodium 140 136 - 145 mmol/L    Potassium 3.9 3.5 - 5.5 mmol/L    Chloride 104 100 - 108 mmol/L    CO2 29 21 - 32 mmol/L    Anion gap 7 3.0 - 18 mmol/L    Glucose 124 (H) 74 - 99 mg/dL    BUN 19 (H) 7.0 - 18 MG/DL    Creatinine 1.06 0.6 - 1.3 MG/DL    BUN/Creatinine ratio 18 12 - 20      GFR est AA >60 >60 ml/min/1.73m2    GFR est non-AA 53 (L) >60 ml/min/1.73m2    Calcium 9.6 8.5 - 10.1 MG/DL    Bilirubin, total 0.2 0.2 - 1.0 MG/DL    ALT (SGPT) 14 13 - 56 U/L    AST (SGOT) 12 (L) 15 - 37 U/L    Alk. phosphatase 122 (H) 45 - 117 U/L    Protein, total 7.1 6.4 - 8.2 g/dL    Albumin 3.4 3.4 - 5.0 g/dL    Globulin 3.7 2.0 - 4.0 g/dL    A-G Ratio 0.9 0.8 - 1.7         EKG interpretation by ED Physician:      X-Ray, CT or other radiology findings or impressions:  CT ABD PELV W CONT    (Results Pending)       Progress notes, Consult notes or additional Procedure notes:       Disposition:  Diagnosis: No diagnosis found. Disposition:   6:05 PM  Pt reevaluated at this time and is resting comfortably in NAD. Discussed results and findings, as well as, diagnosis and plan for discharge. Pt verbalizes understanding and agreement with plan. All questions addressed at this time. Follow-up Information     None           Patient's Medications   Start Taking    No medications on file   Continue Taking    ALBUTEROL (PROVENTIL HFA, VENTOLIN HFA, PROAIR HFA) 90 MCG/ACTUATION INHALER    Take 1 Puff by inhalation every six (6) hours as needed for Wheezing. AMLODIPINE (NORVASC) 2.5 MG TABLET    Take 2.5 mg by mouth nightly. ATORVASTATIN (LIPITOR) 20 MG TABLET    Take 40 mg by mouth daily. BISOPROLOL-HYDROCHLOROTHIAZIDE (ZIAC) 2.5-6.25 MG PER TABLET    Take 1 Tab by mouth daily. DAPAGLIFLOZIN (FARXIGA) 10 MG TAB    Take  by mouth daily. Indications: type 2 diabetes mellitus    DOCUSATE SODIUM (COLACE) 100 MG CAPSULE    Take 1 Cap by mouth two (2) times a day for 90 days. METFORMIN (GLUMETZA) 500 MG TG24 24 HOUR TABLET    Take 500 mg by mouth two (2) times a day. OMEPRAZOLE (PRILOSEC) 10 MG CAPSULE    Take 10 mg by mouth daily. ONDANSETRON HCL (ZOFRAN, AS HYDROCHLORIDE,) 4 MG TABLET    Take 1 Tab by mouth every eight (8) hours as needed for Nausea. ONDANSETRON HCL (ZOFRAN, AS HYDROCHLORIDE,) 4 MG TABLET    Take 1 Tab by mouth every eight (8) hours as needed for Nausea. These Medications have changed    No medications on file   Stop Taking    OXYCODONE-ACETAMINOPHEN (PERCOCET) 5-325 MG PER TABLET    Take 1-2 Tabs by mouth every four (4) hours as needed for Pain. Max Daily Amount: 12 Tabs.

## 2017-03-14 NOTE — IP AVS SNAPSHOT
Summary of Care Report The Summary of Care report has been created to help improve care coordination. Users with access to [employer] or [address] (Web-based application) may access additional patient information including the Discharge Summary. If you are not currently a [address] user and need more information, please call the number listed below in the [place] [address] section and ask to be connected with Medical Records. Facility Information Name Address Phone [address] [facility] [address] [place] [address] [phone] Patient Information Patient Name Sex  Roseline Jacobs ([id number]) Female 1960 Discharge Information Admitting Provider Service Area Unit Gregg Kent MD / [address] [facility] 2c Ortho Spine [facility] / [phone] Discharge Provider Discharge Date/Time Discharge Disposition Destination (none) 3/16/2017 (Pending) AHR (none) Patient Language Language ENGLISH [13] Problem List as of 3/16/2017  Date Reviewed: 2017 Codes Priority Class Noted - Resolved RESOLVED: Abdominal pain ICD-10-CM: R10.9 ICD-9-CM: 789.00   Unknown - 2016 RESOLVED: Follow-up examination following surgery ICD-10-CM: F48 ICD-9-CM: V67.00   10/30/2010 - 2016 RESOLVED: Abdominal pain, chronic, right lower quadrant ICD-10-CM: R10.31, G89.29 ICD-9-CM: 789.03, 338.29   2012 - 2016 Osteoarthritis of left knee ICD-10-CM: M17.9 ICD-9-CM: 715.96   2016 - Present Hypertension (Chronic) ICD-10-CM: I10 
ICD-9-CM: 401.9   2016 - Present Hyperlipidemia (Chronic) ICD-10-CM: D12.2 ICD-9-CM: 272.4   2016 - Present GERD (gastroesophageal reflux disease) (Chronic) ICD-10-CM: K21.9 ICD-9-CM: 530.81   2016 - Present Asthma (Chronic) ICD-10-CM: J45.909 ICD-9-CM: 493.90   6/27/2016 - Present Type 2 diabetes mellitus (HCC) (Chronic) ICD-10-CM: E11.9 ICD-9-CM: 250.00   6/27/2016 - Present SBO (small bowel obstruction) ([facility] [address] ICD-10-CM: K56.69 
ICD-9-CM: 560.9   2/21/2017 - Present Pelvic abscess in female ICD-10-CM: N73.9 ICD-9-CM: 614.4   3/2/2017 - Present Abdominal pain ICD-10-CM: R10.9 ICD-9-CM: 789.00   3/14/2017 - Present Post-operative pain ICD-10-CM: G89.18 
ICD-9-CM: 338.18   3/14/2017 - Present You are allergic to the following Allergen Reactions Macrodantin (Nitrofurantoin Macrocrystalline) Itching Tape (Adhesive) Other (comments) Burned skin when had wound vac Current Discharge Medication List  
  
START taking these medications Dose & Instructions Dispensing Information Comments  
 escitalopram oxalate 10 mg tablet Commonly known as:  Sebastián Godoy Dose:  10 mg Take 1 Tab by mouth daily. Quantity:  30 Tab Refills:  0 CONTINUE these medications which have CHANGED Dose & Instructions Dispensing Information Comments  
 docusate sodium 100 mg capsule Commonly known as:  Ibrahima [place] What changed:   
- when to take this 
- reasons to take this Dose:  100 mg Take 1 Cap by mouth two (2) times a day for 90 days. Quantity:  60 Cap Refills:  2 CONTINUE these medications which have NOT CHANGED Dose & Instructions Dispensing Information Comments  
 albuterol 90 mcg/actuation inhaler Commonly known as:  PROVENTIL HFA, VENTOLIN HFA, PROAIR HFA Dose:  1 Puff Take 1 Puff by inhalation every six (6) hours as needed for Wheezing. Refills:  0  
   
 amLODIPine 2.5 mg tablet Commonly known as:  [place] Dose:  2.5 mg Take 2.5 mg by mouth nightly. Refills:  0 FARXIGA 10 mg Tab Generic drug:  dapagliflozin Take  by mouth daily. Indications: type 2 diabetes mellitus Refills:  0 GLUMETZA 500 mg Tg24 24 hour tablet Generic drug:  metFORMIN Dose:  500 mg Take 500 mg by mouth two (2) times a day. Refills:  0 LIPITOR 20 mg tablet Generic drug:  atorvastatin Dose:  40 mg Take 40 mg by mouth daily. Refills:  0  
   
 omeprazole 10 mg capsule Commonly known as:  PRILOSEC Dose:  10 mg Take 10 mg by mouth daily. Refills:  0  
   
 * ondansetron hcl 4 mg tablet Commonly known as:  ZOFRAN (AS HYDROCHLORIDE) Dose:  4 mg Take 1 Tab by mouth every eight (8) hours as needed for Nausea. Quantity:  20 Tab Refills:  0  
   
 * ondansetron hcl 4 mg tablet Commonly known as:  ZOFRAN (AS HYDROCHLORIDE) Dose:  4 mg Take 1 Tab by mouth every eight (8) hours as needed for Nausea. Quantity:  30 Tab Refills:  1 ZIAC 2.5-6.25 mg per tablet Generic drug:  bisoprolol-hydroCHLOROthiazide Dose:  1 Tab Take 1 Tab by mouth daily. Refills:  0  
   
 * Notice: This list has 2 medication(s) that are the same as other medications prescribed for you. Read the directions carefully, and ask your doctor or other care provider to review them with you. Current Immunizations Name Date Influenza Vaccine 11/15/2016 Follow-up Information Follow up With Details Comments Contact Info Milton Milligan MD   [place] [address] [phone] 
  
 pcp Call in 1 week pcp 1 week Beau Taylor MD In 1 week  [address] [facility] [address]  [facility] [phone] 
[phone] Pearl Garvin MD Call  [address] [place] [address] [address] [facility] [address] [phone] Discharge Instructions DISCHARGE SUMMARY from Nurse The following personal items are in your possession at time of discharge: 
 
Dental Appliances: None Visual Aid: Glasses Home Medications: None Jewelry: None Clothing: Footwear, Undergarments, Pants, Shirt Other Valuables: None Personal Items Sent to Safe: none PATIENT INSTRUCTIONS: 
 
 
F-face looks uneven A-arms unable to move or move unevenly S-speech slurred or non-existent T-time-call 911 as soon as signs and symptoms begin-DO NOT go Back to bed or wait to see if you get better-TIME IS BRAIN. Warning Signs of HEART ATTACK Call 911 if you have these symptoms: 
? Chest discomfort. Most heart attacks involve discomfort in the center of the chest that lasts more than a few minutes, or that goes away and comes back. It can feel like uncomfortable pressure, squeezing, fullness, or pain. ? Discomfort in other areas of the upper body. Symptoms can include pain or discomfort in one or both arms, the back, neck, jaw, or stomach. ? Shortness of breath with or without chest discomfort. ? Other signs may include breaking out in a cold sweat, nausea, or lightheadedness. Don't wait more than five minutes to call [address]! Fast action can save your life. Calling 911 is almost always the fastest way to get lifesaving treatment. Emergency Medical Services staff can begin treatment when they arrive  up to an hour sooner than if someone gets to the hospital by car. The discharge information has been reviewed with the patient. The patient verbalized understanding. Discharge medications reviewed with the patient and appropriate educational materials and side effects teaching were provided. DISCHARGE SUMMARY from Nurse The following personal items are in your possession at time of discharge: 
 
Dental Appliances: None Visual Aid: Glasses Home Medications: None Jewelry: None Clothing: Footwear, Undergarments, Pants, Shirt Other Valuables: None Personal Items Sent to Safe: none PATIENT INSTRUCTIONS: 
 
 
F-face looks uneven A-arms unable to move or move unevenly S-speech slurred or non-existent T-time-call 911 as soon as signs and symptoms begin-DO NOT go Back to bed or wait to see if you get better-TIME IS BRAIN. Warning Signs of HEART ATTACK Call 911 if you have these symptoms: 
? Chest discomfort. Most heart attacks involve discomfort in the center of the chest that lasts more than a few minutes, or that goes away and comes back. It can feel like uncomfortable pressure, squeezing, fullness, or pain. ? Discomfort in other areas of the upper body. Symptoms can include pain or discomfort in one or both arms, the back, neck, jaw, or stomach. ? Shortness of breath with or without chest discomfort. ? Other signs may include breaking out in a cold sweat, nausea, or lightheadedness. Don't wait more than five minutes to call [address]! Fast action can save your life. Calling 911 is almost always the fastest way to get lifesaving treatment. Emergency Medical Services staff can begin treatment when they arrive  up to an hour sooner than if someone gets to the hospital by car. The discharge information has been reviewed with the patient. The patient verbalized understanding. Discharge medications reviewed with the patient and appropriate educational materials and side effects teaching were provided. Patient armband removed and shredded Chart Review Routing History Recipient Method Report Sent By Landon Bailey MD  
Phone: [phone] In [employer] IP Auto Routed Jolene Dixon MD [[id number]] 3/29/2016  8:10 PM 03/29/2016 Olamide Dubon MD  
Phone: [phone] In [employer] IP Auto Routed Jolene Dixon MD [[id number]] 3/29/2016  8:10 PM 03/29/2016 Marichuy Dickinson MD  
Phone: [phone] In [employer] Routed [place] [[id number]] 6/30/2016 11:42 PM 06/30/2016 Olamide Dubon MD  
Phone: [phone] In [facility] Routed [place], [place] [[id number]] 6/30/2016 11:42 PM 06/30/2016 Eliza Bailey MD  
Phone: [phone] In [employer] IP Auto Routed Jolene Dixon MD [[id number]] 2/22/2017  1:46 PM 02/22/2017 Olamide Dubon MD  
Fax: [phone] Phone: [phone] Fax Notes Report [facility] 3/3/2017 10:44 AM 3/3/2017

## 2017-03-14 NOTE — IP AVS SNAPSHOT
[id number] [place], [place] [address] Patient: Azeem Martines MRN: [id number] CRP:4/1/5327 You are allergic to the following Allergen Reactions Macrodantin (Nitrofurantoin Macrocrystalline) Itching Tape (Adhesive) Other (comments) Burned skin when had wound vac Recent Documentation Height Weight Breastfeeding? BMI OB Status Smoking Status 1.626 m 71.2 kg No 26.95 kg/m2 Hysterectomy Never Smoker Emergency Contacts Name Discharge Info Relation Home Work [employer] [facility] DISCHARGE CAREGIVER [3] Spouse [3] [phone] Ashwin Kenny  Mother [14]   [phone] About your hospitalization You were admitted on:  March 14, 2017 You last received care in the:  [address] [place] [address],[address] You were discharged on:  March 16, 2017 Unit phone number:  [phone] Why you were hospitalized Your primary diagnosis was:  Not on File Your diagnoses also included:  Abdominal Pain, Post-Operative Pain Providers Seen During Your Hospitalizations Provider Role Specialty Primary office phone Cindy Polk MD Attending Provider Emergency Medicine [phone] Cadence Hernadez MD Attending Provider [facility] [phone] Liu Burks MD Attending Provider [facility] [phone] Your Primary Care Physician (PCP) Primary Care Physician Office Phone Office Fax Ana Washington [phone] Follow-up Information Follow up With Details Comments Contact Info Miguelina Mayen MD   [place] [address] [phone] 
  
 pcp Call in 1 week pcp 1 week Wilberto Lundberg MD In 1 week  [address] [facility] [address]  [facility] [place] [address] [phone] Michele Garvin MD Call  [address] [facility] [address] [place] [address] [phone] Your Appointments Wednesday March 22, 2017  8:15 AM EDT  
POST OP with Debbie Garza MD  
[address] [facility] [address] [place] [address]  
[phone] Current Discharge Medication List  
  
START taking these medications Dose & Instructions Dispensing Information Comments Morning Noon Evening Bedtime  
 escitalopram oxalate 10 mg tablet Commonly known as:  Lynne Robertson Your last dose was: Your next dose is:    
   
   
 Dose:  10 mg Take 1 Tab by mouth daily. Quantity:  30 Tab Refills:  0 CONTINUE these medications which have CHANGED Dose & Instructions Dispensing Information Comments Morning Noon Evening Bedtime  
 docusate sodium 100 mg capsule Commonly known as:  Elliot Sears What changed:   
- when to take this 
- reasons to take this Your last dose was: Your next dose is:    
   
   
 Dose:  100 mg Take 1 Cap by mouth two (2) times a day for 90 days. Quantity:  60 Cap Refills:  2 CONTINUE these medications which have NOT CHANGED Dose & Instructions Dispensing Information Comments Morning Noon Evening Bedtime  
 albuterol 90 mcg/actuation inhaler Commonly known as:  PROVENTIL HFA, VENTOLIN HFA, PROAIR HFA Your last dose was: Your next dose is:    
   
   
 Dose:  1 Puff Take 1 Puff by inhalation every six (6) hours as needed for Wheezing. Refills:  0  
     
   
   
   
  
 amLODIPine 2.5 mg tablet Commonly known as:  Meli Meyer Your last dose was: Your next dose is:    
   
   
 Dose:  2.5 mg Take 2.5 mg by mouth nightly. Refills:  0 FARXIGA 10 mg Tab Generic drug:  dapagliflozin Your last dose was: Your next dose is: Take  by mouth daily. Indications: type 2 diabetes mellitus Refills:  0 GLUMETZA 500 mg Tg24 24 hour tablet Generic drug:  metFORMIN Your last dose was: Your next dose is:    
   
   
 Dose:  500 mg Take 500 mg by mouth two (2) times a day. Refills:  0 LIPITOR 20 mg tablet Generic drug:  atorvastatin Your last dose was: Your next dose is:    
   
   
 Dose:  40 mg Take 40 mg by mouth daily. Refills:  0  
     
   
   
   
  
 omeprazole 10 mg capsule Commonly known as:  PRILOSEC Your last dose was: Your next dose is:    
   
   
 Dose:  10 mg Take 10 mg by mouth daily. Refills:  0  
     
   
   
   
  
 * ondansetron hcl 4 mg tablet Commonly known as:  ZOFRAN (AS HYDROCHLORIDE) Your last dose was: Your next dose is:    
   
   
 Dose:  4 mg Take 1 Tab by mouth every eight (8) hours as needed for Nausea. Quantity:  20 Tab Refills:  0  
     
   
   
   
  
 * ondansetron hcl 4 mg tablet Commonly known as:  ZOFRAN (AS HYDROCHLORIDE) Your last dose was: Your next dose is:    
   
   
 Dose:  4 mg Take 1 Tab by mouth every eight (8) hours as needed for Nausea. Quantity:  30 Tab Refills:  1 ZIAC 2.5-6.25 mg per tablet Generic drug:  bisoprolol-hydroCHLOROthiazide Your last dose was: Your next dose is:    
   
   
 Dose:  1 Tab Take 1 Tab by mouth daily. Refills:  0  
     
   
   
   
  
 * Notice: This list has 2 medication(s) that are the same as other medications prescribed for you. Read the directions carefully, and ask your doctor or other care provider to review them with you. Where to Get Your Medications Information on where to get these meds will be given to you by the nurse or doctor. ! Ask your nurse or doctor about these medications  
  escitalopram oxalate 10 mg tablet Discharge Instructions DISCHARGE SUMMARY from Nurse The following personal items are in your possession at time of discharge: 
 
Dental Appliances: None Visual Aid: Glasses Home Medications: None Jewelry: None Clothing: Footwear, Undergarments, Pants, Shirt Other Valuables: None Personal Items Sent to Safe: none PATIENT INSTRUCTIONS: 
 
 
F-face looks uneven A-arms unable to move or move unevenly S-speech slurred or non-existent T-time-call 911 as soon as signs and symptoms begin-DO NOT go Back to bed or wait to see if you get better-TIME IS BRAIN. Warning Signs of HEART ATTACK Call 911 if you have these symptoms: 
? Chest discomfort. Most heart attacks involve discomfort in the center of the chest that lasts more than a few minutes, or that goes away and comes back. It can feel like uncomfortable pressure, squeezing, fullness, or pain. ? Discomfort in other areas of the upper body. Symptoms can include pain or discomfort in one or both arms, the back, neck, jaw, or stomach. ? Shortness of breath with or without chest discomfort. ? Other signs may include breaking out in a cold sweat, nausea, or lightheadedness. Don't wait more than five minutes to call [address]! Fast action can save your life. Calling 911 is almost always the fastest way to get lifesaving treatment. Emergency Medical Services staff can begin treatment when they arrive  up to an hour sooner than if someone gets to the hospital by car. The discharge information has been reviewed with the patient. The patient verbalized understanding. Discharge medications reviewed with the patient and appropriate educational materials and side effects teaching were provided. Discharge Orders None Introducing [facility] [place]! Arjun Burnett introduces [employer] patient portal. Now you can access parts of your medical record, email your doctor's office, and request medication refills online. 1. In your internet browser, go to [email or web address] 2. Click on the First Time User? Click Here link in the Sign In box. You will see the New Member Sign Up page. 3. Enter your [employer] Access Code exactly as it appears below. You will not need to use this code after youve completed the sign-up process. If you do not sign up before the expiration date, you must request a new code. · [employer] Access Code: [email or web address] Expires: 4/22/2017  2:41 PM 
 
4. Enter the last four digits of your Social Security Number (xxxx) and Date of Birth (mm/dd/yyyy) as indicated and click Submit. You will be taken to the next sign-up page. 5. Create a [employer] ID. This will be your [employer] login ID and cannot be changed, so think of one that is secure and easy to remember. 6. Create a [employer] password. You can change your password at any time. 7. Enter your Password Reset Question and Answer. This can be used at a later time if you forget your password. 8. Enter your e-mail address. You will receive e-mail notification when new information is available in [address] 9. Click Sign Up. You can now view and download portions of your medical record. 10. Click the Download Summary menu link to download a portable copy of your medical information. If you have questions, please visit the Frequently Asked Questions section of the [employer] website. Remember, [employer] is NOT to be used for urgent needs. For medical emergencies, dial 911. Now available from your iPhone and Android! General Information Please provide this summary of care documentation to your next provider. Patient Signature:  ____________________________________________________________ Date:  ____________________________________________________________  
  
Gerson Mcdaniel Provider Signature:  ____________________________________________________________ Date:  ____________________________________________________________

## 2017-03-14 NOTE — IP AVS SNAPSHOT
Current Discharge Medication List  
  
START taking these medications Dose & Instructions Dispensing Information Comments Morning Noon Evening Bedtime  
 escitalopram oxalate 10 mg tablet Commonly known as:  Meli Jack Your last dose was: Your next dose is:    
   
   
 Dose:  10 mg Take 1 Tab by mouth daily. Quantity:  30 Tab Refills:  0 CONTINUE these medications which have CHANGED Dose & Instructions Dispensing Information Comments Morning Noon Evening Bedtime  
 docusate sodium 100 mg capsule Commonly known as:  Madison Bridges What changed:   
- when to take this 
- reasons to take this Your last dose was: Your next dose is:    
   
   
 Dose:  100 mg Take 1 Cap by mouth two (2) times a day for 90 days. Quantity:  60 Cap Refills:  2 CONTINUE these medications which have NOT CHANGED Dose & Instructions Dispensing Information Comments Morning Noon Evening Bedtime  
 albuterol 90 mcg/actuation inhaler Commonly known as:  PROVENTIL HFA, VENTOLIN HFA, PROAIR HFA Your last dose was: Your next dose is:    
   
   
 Dose:  1 Puff Take 1 Puff by inhalation every six (6) hours as needed for Wheezing. Refills:  0  
     
   
   
   
  
 amLODIPine 2.5 mg tablet Commonly known as:  Dana Lawrence Your last dose was: Your next dose is:    
   
   
 Dose:  2.5 mg Take 2.5 mg by mouth nightly. Refills:  0 FARXIGA 10 mg Tab Generic drug:  dapagliflozin Your last dose was: Your next dose is: Take  by mouth daily. Indications: type 2 diabetes mellitus Refills:  0 GLUMETZA 500 mg Tg24 24 hour tablet Generic drug:  metFORMIN Your last dose was: Your next dose is:    
   
   
 Dose:  500 mg Take 500 mg by mouth two (2) times a day. Refills:  0 LIPITOR 20 mg tablet Generic drug:  atorvastatin Your last dose was: Your next dose is:    
   
   
 Dose:  40 mg Take 40 mg by mouth daily. Refills:  0  
     
   
   
   
  
 omeprazole 10 mg capsule Commonly known as:  PRILOSEC Your last dose was: Your next dose is:    
   
   
 Dose:  10 mg Take 10 mg by mouth daily. Refills:  0  
     
   
   
   
  
 * ondansetron hcl 4 mg tablet Commonly known as:  ZOFRAN (AS HYDROCHLORIDE) Your last dose was: Your next dose is:    
   
   
 Dose:  4 mg Take 1 Tab by mouth every eight (8) hours as needed for Nausea. Quantity:  20 Tab Refills:  0  
     
   
   
   
  
 * ondansetron hcl 4 mg tablet Commonly known as:  ZOFRAN (AS HYDROCHLORIDE) Your last dose was: Your next dose is:    
   
   
 Dose:  4 mg Take 1 Tab by mouth every eight (8) hours as needed for Nausea. Quantity:  30 Tab Refills:  1 ZIAC 2.5-6.25 mg per tablet Generic drug:  bisoprolol-hydroCHLOROthiazide Your last dose was: Your next dose is:    
   
   
 Dose:  1 Tab Take 1 Tab by mouth daily. Refills:  0  
     
   
   
   
  
 * Notice: This list has 2 medication(s) that are the same as other medications prescribed for you. Read the directions carefully, and ask your doctor or other care provider to review them with you. Where to Get Your Medications Information on where to get these meds will be given to you by the nurse or doctor. ! Ask your nurse or doctor about these medications  
  escitalopram oxalate 10 mg tablet

## 2017-03-15 LAB
ALBUMIN SERPL BCP-MCNC: 3.1 G/DL (ref 3.4–5)
ALBUMIN/GLOB SERPL: 0.8 {RATIO} (ref 0.8–1.7)
ALP SERPL-CCNC: 105 U/L (ref 45–117)
ALT SERPL-CCNC: 14 U/L (ref 13–56)
ANION GAP BLD CALC-SCNC: 10 MMOL/L (ref 3–18)
APTT PPP: 29.2 SEC (ref 23–36.4)
AST SERPL W P-5'-P-CCNC: 19 U/L (ref 15–37)
BASOPHILS # BLD AUTO: 0 K/UL (ref 0–0.06)
BASOPHILS # BLD: 0 % (ref 0–2)
BILIRUB SERPL-MCNC: 0.3 MG/DL (ref 0.2–1)
BUN SERPL-MCNC: 16 MG/DL (ref 7–18)
BUN/CREAT SERPL: 16 (ref 12–20)
CALCIUM SERPL-MCNC: 8.9 MG/DL (ref 8.5–10.1)
CHLORIDE SERPL-SCNC: 105 MMOL/L (ref 100–108)
CO2 SERPL-SCNC: 26 MMOL/L (ref 21–32)
CREAT SERPL-MCNC: 1.01 MG/DL (ref 0.6–1.3)
CRP SERPL-MCNC: <0.3 MG/DL (ref 0–0.3)
DIFFERENTIAL METHOD BLD: ABNORMAL
EOSINOPHIL # BLD: 0.2 K/UL (ref 0–0.4)
EOSINOPHIL NFR BLD: 3 % (ref 0–5)
ERYTHROCYTE [DISTWIDTH] IN BLOOD BY AUTOMATED COUNT: 13.1 % (ref 11.6–14.5)
GLOBULIN SER CALC-MCNC: 3.8 G/DL (ref 2–4)
GLUCOSE BLD STRIP.AUTO-MCNC: 80 MG/DL (ref 70–110)
GLUCOSE BLD STRIP.AUTO-MCNC: 82 MG/DL (ref 70–110)
GLUCOSE BLD STRIP.AUTO-MCNC: 92 MG/DL (ref 70–110)
GLUCOSE BLD STRIP.AUTO-MCNC: 92 MG/DL (ref 70–110)
GLUCOSE BLD STRIP.AUTO-MCNC: 96 MG/DL (ref 70–110)
GLUCOSE SERPL-MCNC: 52 MG/DL (ref 74–99)
HCT VFR BLD AUTO: 37.1 % (ref 35–45)
HGB BLD-MCNC: 11.8 G/DL (ref 12–16)
INR PPP: 1 (ref 0.8–1.2)
LYMPHOCYTES # BLD AUTO: 43 % (ref 21–52)
LYMPHOCYTES # BLD: 2.4 K/UL (ref 0.9–3.6)
MCH RBC QN AUTO: 27.8 PG (ref 24–34)
MCHC RBC AUTO-ENTMCNC: 31.8 G/DL (ref 31–37)
MCV RBC AUTO: 87.3 FL (ref 74–97)
MONOCYTES # BLD: 0.5 K/UL (ref 0.05–1.2)
MONOCYTES NFR BLD AUTO: 9 % (ref 3–10)
NEUTS SEG # BLD: 2.4 K/UL (ref 1.8–8)
NEUTS SEG NFR BLD AUTO: 45 % (ref 40–73)
PLATELET # BLD AUTO: 341 K/UL (ref 135–420)
PMV BLD AUTO: 11.7 FL (ref 9.2–11.8)
POTASSIUM SERPL-SCNC: 4.2 MMOL/L (ref 3.5–5.5)
PROT SERPL-MCNC: 6.9 G/DL (ref 6.4–8.2)
PROTHROMBIN TIME: 12.6 SEC (ref 11.5–15.2)
RBC # BLD AUTO: 4.25 M/UL (ref 4.2–5.3)
SODIUM SERPL-SCNC: 141 MMOL/L (ref 136–145)
WBC # BLD AUTO: 5.5 K/UL (ref 4.6–13.2)

## 2017-03-15 PROCEDURE — 85730 THROMBOPLASTIN TIME PARTIAL: CPT | Performed by: FAMILY MEDICINE

## 2017-03-15 PROCEDURE — 85025 COMPLETE CBC W/AUTO DIFF WBC: CPT | Performed by: FAMILY MEDICINE

## 2017-03-15 PROCEDURE — 77030020263 HC SOL INJ SOD CL0.9% LFCR 1000ML

## 2017-03-15 PROCEDURE — 80053 COMPREHEN METABOLIC PANEL: CPT | Performed by: FAMILY MEDICINE

## 2017-03-15 PROCEDURE — 74011250636 HC RX REV CODE- 250/636: Performed by: FAMILY MEDICINE

## 2017-03-15 PROCEDURE — 85610 PROTHROMBIN TIME: CPT | Performed by: FAMILY MEDICINE

## 2017-03-15 PROCEDURE — 82962 GLUCOSE BLOOD TEST: CPT

## 2017-03-15 PROCEDURE — 77030011256 HC DRSG MEPILEX <16IN NO BORD MOLN -A

## 2017-03-15 PROCEDURE — C9113 INJ PANTOPRAZOLE SODIUM, VIA: HCPCS | Performed by: FAMILY MEDICINE

## 2017-03-15 PROCEDURE — 74011000258 HC RX REV CODE- 258: Performed by: FAMILY MEDICINE

## 2017-03-15 PROCEDURE — 65270000029 HC RM PRIVATE

## 2017-03-15 RX ORDER — BACITRACIN 500 [USP'U]/G
1 OINTMENT TOPICAL 3 TIMES DAILY
Status: DISCONTINUED | OUTPATIENT
Start: 2017-03-15 | End: 2017-03-16 | Stop reason: HOSPADM

## 2017-03-15 RX ORDER — DIPHENHYDRAMINE HYDROCHLORIDE 50 MG/ML
12.5 INJECTION, SOLUTION INTRAMUSCULAR; INTRAVENOUS
Status: DISCONTINUED | OUTPATIENT
Start: 2017-03-15 | End: 2017-03-16 | Stop reason: HOSPADM

## 2017-03-15 RX ORDER — INSULIN LISPRO 100 [IU]/ML
INJECTION, SOLUTION INTRAVENOUS; SUBCUTANEOUS
Status: DISCONTINUED | OUTPATIENT
Start: 2017-03-16 | End: 2017-03-16 | Stop reason: HOSPADM

## 2017-03-15 RX ADMIN — HYDROMORPHONE HYDROCHLORIDE 1 MG: 1 INJECTION, SOLUTION INTRAMUSCULAR; INTRAVENOUS; SUBCUTANEOUS at 09:51

## 2017-03-15 RX ADMIN — HYDROMORPHONE HYDROCHLORIDE 1 MG: 1 INJECTION, SOLUTION INTRAMUSCULAR; INTRAVENOUS; SUBCUTANEOUS at 21:31

## 2017-03-15 RX ADMIN — HYDROMORPHONE HYDROCHLORIDE 1 MG: 1 INJECTION, SOLUTION INTRAMUSCULAR; INTRAVENOUS; SUBCUTANEOUS at 18:46

## 2017-03-15 RX ADMIN — DIPHENHYDRAMINE HYDROCHLORIDE 12.5 MG: 50 INJECTION, SOLUTION INTRAMUSCULAR; INTRAVENOUS at 20:28

## 2017-03-15 RX ADMIN — DIPHENHYDRAMINE HYDROCHLORIDE 12.5 MG: 50 INJECTION, SOLUTION INTRAMUSCULAR; INTRAVENOUS at 01:59

## 2017-03-15 RX ADMIN — SODIUM CHLORIDE 8 MG/HR: 900 INJECTION INTRAVENOUS at 18:46

## 2017-03-15 RX ADMIN — SODIUM CHLORIDE 8 MG/HR: 900 INJECTION INTRAVENOUS at 03:02

## 2017-03-15 RX ADMIN — HYDROMORPHONE HYDROCHLORIDE 1 MG: 1 INJECTION, SOLUTION INTRAMUSCULAR; INTRAVENOUS; SUBCUTANEOUS at 12:45

## 2017-03-15 RX ADMIN — DIPHENHYDRAMINE HYDROCHLORIDE 12.5 MG: 50 INJECTION, SOLUTION INTRAMUSCULAR; INTRAVENOUS at 08:23

## 2017-03-15 RX ADMIN — SODIUM CHLORIDE 125 ML/HR: 900 INJECTION, SOLUTION INTRAVENOUS at 06:36

## 2017-03-15 RX ADMIN — HYDROMORPHONE HYDROCHLORIDE 1 MG: 1 INJECTION, SOLUTION INTRAMUSCULAR; INTRAVENOUS; SUBCUTANEOUS at 06:33

## 2017-03-15 RX ADMIN — BACITRACIN 1 G: 500 OINTMENT TOPICAL at 21:30

## 2017-03-15 RX ADMIN — DIPHENHYDRAMINE HYDROCHLORIDE 12.5 MG: 50 INJECTION, SOLUTION INTRAMUSCULAR; INTRAVENOUS at 14:43

## 2017-03-15 RX ADMIN — HEPARIN SODIUM 5000 UNITS: 5000 INJECTION, SOLUTION INTRAVENOUS; SUBCUTANEOUS at 15:53

## 2017-03-15 RX ADMIN — HYDROMORPHONE HYDROCHLORIDE 1 MG: 1 INJECTION, SOLUTION INTRAMUSCULAR; INTRAVENOUS; SUBCUTANEOUS at 15:53

## 2017-03-15 RX ADMIN — HEPARIN SODIUM 5000 UNITS: 5000 INJECTION, SOLUTION INTRAVENOUS; SUBCUTANEOUS at 08:25

## 2017-03-15 RX ADMIN — BACITRACIN 1 G: 500 OINTMENT TOPICAL at 15:52

## 2017-03-15 RX ADMIN — SODIUM CHLORIDE 8 MG/HR: 900 INJECTION INTRAVENOUS at 05:23

## 2017-03-15 RX ADMIN — SODIUM CHLORIDE 125 ML/HR: 900 INJECTION, SOLUTION INTRAVENOUS at 14:43

## 2017-03-15 RX ADMIN — SODIUM CHLORIDE 8 MG/HR: 900 INJECTION INTRAVENOUS at 14:43

## 2017-03-15 RX ADMIN — HYDROMORPHONE HYDROCHLORIDE 1 MG: 1 INJECTION, SOLUTION INTRAMUSCULAR; INTRAVENOUS; SUBCUTANEOUS at 03:42

## 2017-03-15 NOTE — H&P
History and Physical    Patient: Manuel العلي               Sex: female          DOA: 3/14/2017       YOB: 1960      Age:  62 y.o.        LOS:  LOS: 0 days        Chief Complaint   Patient presents with    Abdominal Pain    Post-Op Problem         HPI:     Manuel العلي is a 62 y.o. female who presents with abdominal pain onset this morning. Patient reports the pain is in the epigastrium and Left lower and mid quadrants. It is severe, constant and non radiating. Intensity is 10/10. She had associated emesis. Denies dysuria, diarrhea. Patient had recent abdominal surgery which was complicated on 4/53/36. She has a hx of multiple abdominal surgeries. Labs and CT abdomen were done. Negative for acute obstruction . Patient had hemoccult test was negative. Patient will be admitted for further treatment. Dr Irina Casillas surgery consulted. Past Medical History:   Diagnosis Date    Abdominal adhesions     Anemia     Arthritis     all over the body    Asthma     Asthma     Diabetes ([address]     Diabetes mellitus     Dyskinesia     bilateral    Essential hypertension     GERD (gastroesophageal reflux disease)     Hypertension     Menopause     Stool color black    . Past Surgical History:   Procedure Laterality Date    HX CHOLECYSTECTOMY  6/28/10    HX HERNIA REPAIR      HX HYSTERECTOMY      Fibroids    HX KNEE REPLACEMENT      HX OOPHORECTOMY      TISSUE LOCALIZATION-EXCISION         No current facility-administered medications on file prior to encounter. Current Outpatient Prescriptions on File Prior to Encounter   Medication Sig Dispense Refill    ondansetron hcl (ZOFRAN, AS HYDROCHLORIDE,) 4 mg tablet Take 1 Tab by mouth every eight (8) hours as needed for Nausea. 30 Tab 1    dapagliflozin (FARXIGA) 10 mg tab Take  by mouth daily.  Indications: type 2 diabetes mellitus      docusate sodium (COLACE) 100 mg capsule Take 1 Cap by mouth two (2) times a day for 90 days. (Patient taking differently: Take 100 mg by mouth as needed.) 60 Cap 2    ondansetron hcl (ZOFRAN, AS HYDROCHLORIDE,) 4 mg tablet Take 1 Tab by mouth every eight (8) hours as needed for Nausea. 20 Tab 0    amLODIPine (NORVASC) 2.5 mg tablet Take 2.5 mg by mouth nightly.  bisoprolol-hydrochlorothiazide (ZIAC) 2.5-6.25 mg per tablet Take 1 Tab by mouth daily.  atorvastatin (LIPITOR) 20 mg tablet Take 40 mg by mouth daily.  omeprazole (PRILOSEC) 10 mg capsule Take 10 mg by mouth daily.  albuterol (PROVENTIL HFA, VENTOLIN HFA, PROAIR HFA) 90 mcg/actuation inhaler Take 1 Puff by inhalation every six (6) hours as needed for Wheezing.  metFORMIN (GLUMETZA) 500 mg TG24 24 hour tablet Take 500 mg by mouth two (2) times a day. Social History     Social History    Marital status:      Spouse name: N/A    Number of children: N/A    Years of education: N/A     Occupational History    Not on file. Social History Main Topics    Smoking status: Never Smoker    Smokeless tobacco: Never Used    Alcohol use No    Drug use: No    Sexual activity: Yes     Partners: Male     Birth control/ protection: None     Other Topics Concern    Not on file     Social History Narrative       Prior to Admission Medications   Prescriptions Last Dose Informant Patient Reported? Taking? albuterol (PROVENTIL HFA, VENTOLIN HFA, PROAIR HFA) 90 mcg/actuation inhaler   Yes No   Sig: Take 1 Puff by inhalation every six (6) hours as needed for Wheezing. amLODIPine (NORVASC) 2.5 mg tablet   Yes No   Sig: Take 2.5 mg by mouth nightly. atorvastatin (LIPITOR) 20 mg tablet   Yes No   Sig: Take 40 mg by mouth daily. bisoprolol-hydrochlorothiazide (ZIAC) 2.5-6.25 mg per tablet   Yes No   Sig: Take 1 Tab by mouth daily. dapagliflozin (FARXIGA) 10 mg tab   Yes No   Sig: Take  by mouth daily.  Indications: type 2 diabetes mellitus   docusate sodium (COLACE) 100 mg capsule   No No   Sig: Take 1 Cap by mouth two (2) times a day for 90 days. Patient taking differently: Take 100 mg by mouth as needed. metFORMIN (GLUMETZA) 500 mg TG24 24 hour tablet   Yes No   Sig: Take 500 mg by mouth two (2) times a day. omeprazole (PRILOSEC) 10 mg capsule   Yes No   Sig: Take 10 mg by mouth daily. ondansetron hcl (ZOFRAN, AS HYDROCHLORIDE,) 4 mg tablet   No No   Sig: Take 1 Tab by mouth every eight (8) hours as needed for Nausea. ondansetron hcl (ZOFRAN, AS HYDROCHLORIDE,) 4 mg tablet   No No   Sig: Take 1 Tab by mouth every eight (8) hours as needed for Nausea. Facility-Administered Medications: None       Family History   Problem Relation Age of Onset    Hypertension Other      parent    Breast Cancer Other 21    Heart Disease Father     Hypertension Father     Hypertension Other      sibling    Diabetes Other      parent       Review of Systems   Constitutional: Negative for chills and fever. HENT: Negative. Eyes: Negative. Respiratory: Negative. Cardiovascular: Negative. Gastrointestinal: Positive for abdominal pain, nausea and vomiting. Negative for blood in stool, diarrhea and melena. Genitourinary: Negative for dysuria and flank pain. Musculoskeletal: Negative. Skin: Negative. Neurological: Negative. Endo/Heme/Allergies: Negative. Psychiatric/Behavioral: Negative. Physical Exam:       Visit Vitals    /84 (BP 1 Location: Right arm, BP Patient Position: At rest)    Pulse 90    Temp 98.6 °F (37 °C)    Resp 17    Wt 71.2 kg (157 lb)    SpO2 100%    BMI 26.95 kg/m2       Physical Exam   Constitutional: She is oriented to person, place, and time. She appears well-developed and well-nourished. No distress. HENT:   Head: Normocephalic and atraumatic. Eyes: Conjunctivae are normal. Pupils are equal, round, and reactive to light. No scleral icterus. Neck: Neck supple. Cardiovascular: Normal rate and normal heart sounds. Abdominal: Soft. Bowel sounds are normal. She exhibits distension. There is tenderness. There is guarding. There is no rebound. Musculoskeletal: She exhibits no edema. Neurological: She is alert and oriented to person, place, and time. Skin: Skin is warm. No rash noted. She is not diaphoretic. No erythema. No pallor. Psychiatric: She has a normal mood and affect. Ancillary Studies: All lab and imaging reviewed for the past 24 hours. Recent Results (from the past 24 hour(s))   URINALYSIS W/ RFLX MICROSCOPIC    Collection Time: 03/14/17  4:30 PM   Result Value Ref Range    Color YELLOW      Appearance CLEAR      Specific gravity >1.030 (H) 1.005 - 1.030    pH (UA) 5.5 5.0 - 8.0      Protein NEGATIVE  NEG mg/dL    Glucose >1000 (A) NEG mg/dL    Ketone TRACE (A) NEG mg/dL    Bilirubin NEGATIVE  NEG      Blood LARGE (A) NEG      Urobilinogen 0.2 0.2 - 1.0 EU/dL    Nitrites NEGATIVE  NEG      Leukocyte Esterase NEGATIVE  NEG     URINE MICROSCOPIC ONLY    Collection Time: 03/14/17  4:30 PM   Result Value Ref Range    WBC 0 to 3 0 - 4 /hpf    RBC 11 to 20 0 - 5 /hpf    Epithelial cells 1+ 0 - 5 /lpf    Bacteria NEGATIVE  NEG /hpf   CBC WITH AUTOMATED DIFF    Collection Time: 03/14/17  4:45 PM   Result Value Ref Range    WBC 5.9 4.6 - 13.2 K/uL    RBC 4.47 4.20 - 5.30 M/uL    HGB 12.8 12.0 - 16.0 g/dL    HCT 38.7 35.0 - 45.0 %    MCV 86.6 74.0 - 97.0 FL    MCH 28.6 24.0 - 34.0 PG    MCHC 33.1 31.0 - 37.0 g/dL    RDW 13.0 11.6 - 14.5 %    PLATELET 345 458 - 134 K/uL    MPV 10.2 9.2 - 11.8 FL    NEUTROPHILS 52 40 - 73 %    LYMPHOCYTES 36 21 - 52 %    MONOCYTES 9 3 - 10 %    EOSINOPHILS 3 0 - 5 %    BASOPHILS 0 0 - 2 %    ABS. NEUTROPHILS 3.0 1.8 - 8.0 K/UL    ABS. LYMPHOCYTES 2.1 0.9 - 3.6 K/UL    ABS. MONOCYTES 0.5 0.05 - 1.2 K/UL    ABS. EOSINOPHILS 0.2 0.0 - 0.4 K/UL    ABS.  BASOPHILS 0.0 0.0 - 0.06 K/UL    DF AUTOMATED     LIPASE    Collection Time: 03/14/17  4:45 PM   Result Value Ref Range    Lipase 260 73 - 393 U/L METABOLIC PANEL, COMPREHENSIVE    Collection Time: 03/14/17  4:45 PM   Result Value Ref Range    Sodium 140 136 - 145 mmol/L    Potassium 3.9 3.5 - 5.5 mmol/L    Chloride 104 100 - 108 mmol/L    CO2 29 21 - 32 mmol/L    Anion gap 7 3.0 - 18 mmol/L    Glucose 124 (H) 74 - 99 mg/dL    BUN 19 (H) 7.0 - 18 MG/DL    Creatinine 1.06 0.6 - 1.3 MG/DL    BUN/Creatinine ratio 18 12 - 20      GFR est AA >60 >60 ml/min/1.73m2    GFR est non-AA 53 (L) >60 ml/min/1.73m2    Calcium 9.6 8.5 - 10.1 MG/DL    Bilirubin, total 0.2 0.2 - 1.0 MG/DL    ALT (SGPT) 14 13 - 56 U/L    AST (SGOT) 12 (L) 15 - 37 U/L    Alk. phosphatase 122 (H) 45 - 117 U/L    Protein, total 7.1 6.4 - 8.2 g/dL    Albumin 3.4 3.4 - 5.0 g/dL    Globulin 3.7 2.0 - 4.0 g/dL    A-G Ratio 0.9 0.8 - 1.7     POC LACTIC ACID    Collection Time: 03/14/17  8:51 PM   Result Value Ref Range    Lactic Acid (POC) 0.8 0.4 - 2.0 mmol/L       Assessment/Plan     Active Problems:    Abdominal pain (3/14/2017)      Post-operative pain (3/14/2017)      DM   HTN  GERD  Hx anemia  Hx asthma      PLAN:    Abdominal Pain   - possible post op recent surgery 2/12/17  - LLQ and epigastric .  CT abdomen prelim read shows no acute obstruction.   - Continue pain management  - NPO and advance diet as tolerable  - Zofran for nausea and vomiting   - General Surgery consulting     DM   - SSI   - ACHS    HTN  - Norvasc   - Ziac     GERD  - Protonix     Hx asthma    DVT Prophylaxis - Heparin     GI Prophylaxis - Protonix     Full code       Megha Dejesus MD  3/14/2017  8:33 PM

## 2017-03-15 NOTE — PROGRESS NOTES
0800  NPO  Maintained,on and off  Itching but she said it is always internal, no rashes nted, she takes benadryl po at home, she said narcotics make her itch, per pt her  Doctor is aware, there is Benadryl  IV every 6 hours which she wants to take it every 6 hours. 1000  seen by Dr Corey Al, concern been address    1400  Tolerated her diet no nausea, on and off itching,relieved with Benadryl, no other symptoms from itching pain under control so far. 1800  Medicated for pain, tolerated her diet, no nausea, itching no rashes noted, no respiratory distress.

## 2017-03-15 NOTE — PROGRESS NOTES
conducted an initial consultation and Spiritual Assessment for Tierney Cristobal, who is a 62 y.o.,female. Patients Primary Language is: [place]. According to the patients EMR Mormonism Affiliation is: [facility].     The reason the Patient came to the hospital is:   Patient Active Problem List    Diagnosis Date Noted    Abdominal pain 03/14/2017    Post-operative pain 03/14/2017    Pelvic abscess in female 03/02/2017    SBO (small bowel obstruction) ([facility] 02/21/2017    Osteoarthritis of left knee 06/27/2016    Hypertension 06/27/2016    Hyperlipidemia 06/27/2016    GERD (gastroesophageal reflux disease) 06/27/2016    Asthma 06/27/2016    Type 2 diabetes mellitus ([facility] 06/27/2016        The  provided the following Interventions:  Initiated a relationship of care and support with patient in room 2224 at 200 today. Listened empathically as she talked about being here and how tired she has been just lately. She is in good spirits today and family is present in room as well. Provided information about Spiritual Care Services and of our continued presence here for her. Offered prayer and assurance of continued prayers on patients behalf. The following outcomes were achieved:  Patient shared limited information about her medical narrative and spiritual journey/beliefs. Patient processed feeling about current hospitalization. Patient expressed gratitude for pastoral care visit. Assessment:  Patient does not have any Confucianism/cultural needs that will affect patients preferences in health care. There are no further spiritual or Confucianism issues which require Spiritual Care Services interventions at this time. Plan:  Chaplains will continue to follow and will provide pastoral care on an as needed/requested basis    . [facility]   Spiritual Care   [phone]

## 2017-03-15 NOTE — PROGRESS NOTES
Patient has designated her  to participate in his/her discharge plan and to receive any needed information.      Name:   Leana Tian  spouse   [phone] [address] [place] [address]   March 15, 2017     Address:  Phone number:

## 2017-03-15 NOTE — PROGRESS NOTES
Nutrition initial assessment/Plan of care      RECOMMENDATIONS:   1. Regular diet  2. Monitor weight and PO intake  3. RD to follow     GOALS:   1. PO intake meets >75% of protein/calorie needs by 3/22  2. Weight Maintenance (+/- 1-2 lb) by 3/22       ASSESSMENT:   Per BMI of 26.9, weight is in the overweight classification. Labs noted. Avergae BG in the last 24 hours: . Nutrition recommendations listed. RD to follow. Nutrition Diagnoses:   None at this time    Nutrition Risk:  [] High  [] Moderate [x]  Low    SUBJECTIVE/OBJECTIVE:   Patient was admitted for abdominal pain. She had a recent abdominal surgery on 2/21. She has h/o of diabetes but she eats a regular diet. Patient has nausea after she ate a few bites of her lunch. She reports UBW around 152-157 lb. No known food allergy. Information Obtained from:    [x] Chart Review   [x] Patient   [] Family/Caregiver   [] Nurse/Physician   [] Interdisciplinary Meeting/Rounds    Diet: Gi soft  Medications: [x] Reviewed  (Lispro, 0.9%NaCl: 125 ml/hr)  Allergies: [x] Reviewed   Encounter Diagnoses     ICD-10-CM ICD-9-CM   1.  Abdominal pain, generalized R10.84 789.07     Past Medical History:   Diagnosis Date    Abdominal adhesions     Anemia     Arthritis     all over the body    Asthma     Asthma     Diabetes ([facility]     Diabetes mellitus     Dyskinesia     bilateral    Essential hypertension     GERD (gastroesophageal reflux disease)     Hypertension     Menopause     Stool color black       Labs:  Lab Results   Component Value Date/Time    Sodium 141 03/15/2017 03:55 AM    Potassium 4.2 03/15/2017 03:55 AM    Chloride 105 03/15/2017 03:55 AM    CO2 26 03/15/2017 03:55 AM    Anion gap 10 03/15/2017 03:55 AM    Glucose 52 03/15/2017 03:55 AM    BUN 16 03/15/2017 03:55 AM    Creatinine 1.01 03/15/2017 03:55 AM    Calcium 8.9 03/15/2017 03:55 AM    Magnesium 2.1 03/02/2017 11:35 AM    Albumin 3.1 03/15/2017 03:55 AM     Anthropometrics: BMI (calculated): 26.9  Last 3 Recorded Weights in this Encounter    03/14/17 1613   Weight: 71.2 kg (157 lb)    Ht Readings from Last 1 Encounters:   03/14/17 5' 4\" (1.626 m)     Patient Vitals for the past 100 hrs:   % Diet Eaten   03/15/17 0831 0 %       IBW: 120 lb %IBW: 131% UBW: 157 lb %UBW: 100%   [] Weight Loss [] Weight Gain [x] Weight Stable    Estimated Nutrition Needs: [x] MSJ  [] Other:  Calories: 5960-2081 kcal Based on:   [x] Actual BW    Protein:   65-80 g Based on:   [x] IBW/ABW    Fluid:       6556-1019 ml Based on:   [x] Actual BW      [x] No Cultural, Baptist or ethnic dietary need identified.     [] Cultural, Baptist and ethnic food preferences identified and addressed     Wt Status:  [] Normal (18.6 - 24.9) [] Underweight (<18.5) [x] Overweight (25 - 29.9) [] Mild Obesity (30 - 34.9)  [] Moderate Obesity (35 - 39.9) [] Morbid Obesity (40+)   [] Moderate Malnutrition [] Severe Malnutrition in the context of :     Nutrition Problems Identified:   [] Suboptimal PO intake   [] Food Allergies  [] Difficulty chewing/swallowing/poor dentition  [] Constipation/Diarrhea   [x] Nausea  [] None  [] Other:     Plan:   [] Therapeutic Diet  [x]  Obtained/adjusted food preferences/tolerances and/or snacks options   []  Supplements added   [] Occupational therapy following for feeding techniques  []  HS snack added   []  Modify diet texture   []  Modify diet for food allergies   []  Educate patient   []  Assist with menu selection   [x]  Monitor PO intake on meal rounds   [x]  Continue inpatient monitoring and intervention   []  Participated in discharge planning/Interdisciplinary rounds/Team meetings   []  Other:     Education Needs:   [] Not appropriate for teaching at this time due to:   [x] Identified and addressed    Nutrition Monitoring and Evaluation:  [x] Continue ongoing monitoring and intervention  [] Other    Brennon Officer, [address]  Pager: [phone]

## 2017-03-15 NOTE — PROGRESS NOTES
[employer]   Discharge Planning/ Assessment    Reasons for Intervention: Interviewed patient, she agrees to share her discharge information with her  . She was independent prior to admission and see Dr Tori Ambrose for her primary care needs. She has [employer] and [employer]. Her discharge plan is to return home.      High Risk Criteria  [x] Yes  []No   Physician Referral  [] Yes  [x]No        Date    Nursing Referral  [] Yes  [x]No        Date    Patient/Family Request  [] Yes  [x]No        Date       Resources:    Medicare  [] Yes  [x]No   Medicaid  [] Yes  [x]No   No Resources  [] Yes  [x]No   Private Insurance  [x] Yes  []No    Name/Phone Number    Other  [] Yes  [x]No        (i.e. Workman's Comp)         Prior Services:    Prior Services  [] Yes  [x]No   Home Health  [] Yes  [x]No   [address] [employer]  [] Yes  [x]No        [address] [facility] [address] Program  [] Yes  [x]No       Meals on Wheels  [] Yes  [x]No   Office on Aging  [] Yes  [x]No   Transportation Services  [] Yes  [x]No   Nursing Home  [] Yes  [x]No        Nursing Home Name    [address]  [] Yes  [x]No        [address] Name    Other       Information Source:      Information obtained from  [x] Patient  [] Parent   [] Haig Stamp  [] Child  [] Spouse   [] Significant Other/Partner   [] Friend      [] EMS    [] Nursing Home Chart          [x] Other:chart   Chart Review  [] Yes  []No     Family/Support System:    Patient lives with  [] Alone    [x] Spouse   [] Significant Other  [] Children  [] Caretaker   [] Parent  [] Sibling     [] Other       Other Support System:    Is the patient responsible for care of others  [] Yes  [x]No   Information of person caring for patient on  discharge self   Managers financial affairs independently  [] Yes  []No   If no, explain:      Status Prior to Admission:    Mental Status  [x] Awake  [x] Alert  [x] Oriented  [] Quiet/Calm [] Lethargic/Sedated   [] Disoriented  [] Restless/Anxious  [] Combative   Personal Care  [] Dependent  [x] Independent Personal Care  [] Requires Assistance   Meal Preparation Ability  [x] Independent   [] Standby Assistance   [] Minimal Assistance   [] Moderate Assistance  [] Maximum Assistance     [] Total Assistance   Chores  [x] Independent with Chores   [] N/A Nursing Home Resident   [] Requires Assistance   Bowel/Bladder  [x] Continent  [] Catheter  [] Incontinent  [] Ostomy Self-Care    [] Urine Diversion Self-Care  [] Maximum Assistance     [] Total Assistance   Number of Persons needed for assistance    DME at home  [] [address], Pasqual Ryan  [] [address] [facility], Straight   [] Commode    [] Bathroom/Grab Bars  [] Hospital Bed  [] Nebulizer  [] Oxygen           [] Raised Toilet Seat  [] Shower Chair  [] Side Rails for Bed   [] Tub Transfer Bench   [] Terri Clipper  [] [address], [address]      [] Other:   Vendor      Treatment Presently Receiving:    Current Treatments  [] Chemotherapy  [] Dialysis  [] Insulin  [] IVAB [x] IVF   [] O2  [] PCA   [] PT   [] RT   [] Tube Feedings   [] Wound Care     Psychosocial Evaluation:    Verbalized Knowledge of Disease Process  [x] Patient  [x]Family   Coping with Disease Process  [x] Patient  [x]Family   Requires Further Counseling Coping with Disease Process  [] Patient  []Family     Identified Projected Needs:    Home Health Aid  [] Yes  [x]No   Transportation  [] Yes  [x]No   Education  [] Yes  [x]No        Specific Education     Financial Counseling  [] Yes  [x]No   Inability to Care for Self/Will Require 24 hour care  [] Yes  [x]No   Pain Management  [] Yes  [x]No   Home Infusion Therapy  [] Yes  [x]No   Oxygen Therapy  [] Yes  [x]No   DME  [] Yes  [x]No   Long Term Care Placement  [] Yes  [x]No   Rehab  [] Yes  [x]No   Physical Therapy  [] Yes  [x]No   Needs Anticipated At This Time  [] Yes  [x]No     Intra-Hospital Referral:    [address] [facility]  [] Yes  [x]No     [] Yes  [x]No Patient Representative  [] Yes  [x]No   Staff for Teaching Needs  [] Yes  [x]No   Specialty Teaching Needs     Diabetic Educator  [] Yes  [x]No   Referral for Diabetic Educator Needed  [] Yes  [x]No  If Yes, place order for Nutritionist or Diabetic Consult     Tentative Discharge Plan:    Home with No Services  [x] Yes  []No   Home with [address]  [] Yes  [x]No        If Yes, specify type    [address]  [] Yes  [x]No        If Yes, specify type    Meals on Wheels  [] Yes  [x]No   Office of Aging  [] Yes  [x]No   NHP  [] Yes  [x]No   Return to the Nursing Home  [] Yes  [x]No   Rehab Therapy  [] Yes  [x]No   Acute Rehab  [] Yes  [x]No   Subacute Rehab  [] Yes  [x]No   Private Care  [] Yes  [x]No   Substance Abuse Referral  [] Yes  [x]No   Transportation  [] Yes  [x]No   Chore Service  [] Yes  [x]No   Inpatient Hospice  [] Yes  [x]No   OP RT  [] Yes  [x] No   OP Hemo  [] Yes  [x] No   OP PT  [] Yes  [x]No   Support Group  [] Yes  [x]No   Reach to Recovery  [] Yes  [x]No   OP Oncology Clinic  [] Yes  [x]No   Clinic Appointment  [] Yes  [x]No   DME  [] Yes  [x]No   Comments    Name of D/C Planner or  Given to Patient or Family Gilbert Kaminski RN   Phone Number [phone]   Date Match 15, 2017   Time 735 am   If you are discharged home, whom do you designate to participate in your discharge plan and receive any information needed?      Enter name of [address] [place] [address]  spouse        Phone # of designee         Address of [address]        Updated March 15, 2017        Patient refused to designate any           individual

## 2017-03-15 NOTE — PROGRESS NOTES
Patient admitted from ED via stretcher. Alert & oriented x4. Call light in reach & side rails up x3. Old abdominal incision healing well. Abdomen soft & tender. Patient has skin tear to left upper abdomen that she says is from sensitivity to paper tape.

## 2017-03-15 NOTE — ROUTINE PROCESS
Bedside and Verbal shift change report given to Wilber Ferreira RN (oncoming nurse) by Guillermina Severin, RN   (offgoing nurse). Report included the following information SBAR, Kardex and MAR.

## 2017-03-15 NOTE — PROGRESS NOTES
Patient seen and examined. Was admitted last night. Case was discussed with my partner Dr. Mckay Musa. She is doing well today.      Plan:  Discharge home  Follow up next week with me  Follow up with Dr. Kenny Godoy for possible repeat EGD  PPI

## 2017-03-16 VITALS
OXYGEN SATURATION: 98 % | BODY MASS INDEX: 26.8 KG/M2 | DIASTOLIC BLOOD PRESSURE: 95 MMHG | WEIGHT: 157 LBS | SYSTOLIC BLOOD PRESSURE: 154 MMHG | RESPIRATION RATE: 18 BRPM | HEART RATE: 90 BPM | TEMPERATURE: 98.3 F | HEIGHT: 64 IN

## 2017-03-16 LAB
GLUCOSE BLD STRIP.AUTO-MCNC: 84 MG/DL (ref 70–110)
GLUCOSE BLD STRIP.AUTO-MCNC: 94 MG/DL (ref 70–110)

## 2017-03-16 PROCEDURE — 77030011256 HC DRSG MEPILEX <16IN NO BORD MOLN -A

## 2017-03-16 PROCEDURE — 74011250636 HC RX REV CODE- 250/636: Performed by: FAMILY MEDICINE

## 2017-03-16 PROCEDURE — 82962 GLUCOSE BLOOD TEST: CPT

## 2017-03-16 PROCEDURE — 74011000258 HC RX REV CODE- 258: Performed by: FAMILY MEDICINE

## 2017-03-16 PROCEDURE — C9113 INJ PANTOPRAZOLE SODIUM, VIA: HCPCS | Performed by: FAMILY MEDICINE

## 2017-03-16 RX ORDER — ESCITALOPRAM OXALATE 10 MG/1
10 TABLET ORAL DAILY
Qty: 30 TAB | Refills: 0 | Status: SHIPPED | OUTPATIENT
Start: 2017-03-16 | End: 2017-06-14

## 2017-03-16 RX ADMIN — HEPARIN SODIUM 5000 UNITS: 5000 INJECTION, SOLUTION INTRAVENOUS; SUBCUTANEOUS at 00:27

## 2017-03-16 RX ADMIN — SODIUM CHLORIDE 8 MG/HR: 900 INJECTION INTRAVENOUS at 06:35

## 2017-03-16 RX ADMIN — HYDROMORPHONE HYDROCHLORIDE 1 MG: 1 INJECTION, SOLUTION INTRAMUSCULAR; INTRAVENOUS; SUBCUTANEOUS at 12:23

## 2017-03-16 RX ADMIN — DIPHENHYDRAMINE HYDROCHLORIDE 12.5 MG: 50 INJECTION, SOLUTION INTRAMUSCULAR; INTRAVENOUS at 08:00

## 2017-03-16 RX ADMIN — HYDROMORPHONE HYDROCHLORIDE 1 MG: 1 INJECTION, SOLUTION INTRAMUSCULAR; INTRAVENOUS; SUBCUTANEOUS at 03:41

## 2017-03-16 RX ADMIN — SODIUM CHLORIDE 8 MG/HR: 900 INJECTION INTRAVENOUS at 00:27

## 2017-03-16 RX ADMIN — HYDROMORPHONE HYDROCHLORIDE 1 MG: 1 INJECTION, SOLUTION INTRAMUSCULAR; INTRAVENOUS; SUBCUTANEOUS at 00:27

## 2017-03-16 RX ADMIN — BACITRACIN 1 G: 500 OINTMENT TOPICAL at 09:00

## 2017-03-16 RX ADMIN — HYDROMORPHONE HYDROCHLORIDE 1 MG: 1 INJECTION, SOLUTION INTRAMUSCULAR; INTRAVENOUS; SUBCUTANEOUS at 06:35

## 2017-03-16 RX ADMIN — HEPARIN SODIUM 5000 UNITS: 5000 INJECTION, SOLUTION INTRAVENOUS; SUBCUTANEOUS at 07:59

## 2017-03-16 RX ADMIN — DIPHENHYDRAMINE HYDROCHLORIDE 12.5 MG: 50 INJECTION, SOLUTION INTRAMUSCULAR; INTRAVENOUS at 02:44

## 2017-03-16 RX ADMIN — SODIUM CHLORIDE 125 ML/HR: 900 INJECTION, SOLUTION INTRAVENOUS at 06:35

## 2017-03-16 RX ADMIN — HYDROMORPHONE HYDROCHLORIDE 1 MG: 1 INJECTION, SOLUTION INTRAMUSCULAR; INTRAVENOUS; SUBCUTANEOUS at 09:42

## 2017-03-16 NOTE — PROGRESS NOTES
Care Management Interventions  PCP Verified by CM: Yes  Palliative Care Consult (Criteria: CHF and RRAT>21): No  Reason for No Palliative Care Consult: Patient declined palliative services at this time  Mode of Transport at Discharge: Other (see comment) ()  Transition of Care Consult (CM Consult):  Other (home)  MyChart Signup: No  Discharge Durable Medical Equipment: No  Physical Therapy Consult: No  Occupational Therapy Consult: No  Speech Therapy Consult: No  Current Support Network: Lives with Spouse  Confirm Follow Up Transport: Family  Plan discussed with Pt/Family/Caregiver: Yes  Freedom of Choice Offered: Yes  Discharge Location  Discharge Placement: Home

## 2017-03-16 NOTE — PROGRESS NOTES
Progress Note    Late Entry:  Patient seen and managed 3/15/17. Note entered as late entry    Patient: Jenaro Westbrook               Sex: female          DOA: 3/14/2017       YOB: 1960      Age:  62 y.o.        LOS:  LOS: 2 days             CHIEF COMPLAINT:  Abdominal pain, no evidence of SBO    Subjective:     Patient is skeptical about discharge, she still has abdominal pain. Objective:      Visit Vitals    /82 (BP 1 Location: Left arm, BP Patient Position: At rest)    Pulse 77    Temp 97.4 °F (36.3 °C)    Resp 18    Ht 5' 4\" (1.626 m)    Wt 71.2 kg (157 lb)    SpO2 96%    Breastfeeding No    BMI 26.95 kg/m2       Physical Exam:  Gen:  No distress, no complaint  Lungs:  Clear bilaterally, no wheeze or rhonchi  Heart:  Regular rate and rhythm, no murmurs or gallops  Abdomen:  Soft, non-tender, normal bowel sounds        Lab/Data Reviewed: All lab results for the last 24 hours reviewed. Assessment/Plan     Active Problems:    Abdominal pain (3/14/2017)      Post-operative pain (3/14/2017)        Plan:  Advancing diet  Discussed with patient and  at the bedside. Anticipate discharge tomorrow if tolerates diet well.

## 2017-03-16 NOTE — PROGRESS NOTES
0710 Report received. Assessment done. No distress noted; In bed; given pain medicine. Nad noted. 1010 In bed resting; denies any complaints; safety measures maintained. 1150 D/c orders placed    1230 Educated pt of her discharge. Pt understood and denies any questions. Pt in pain of 7/10; given dilaudid; informed pt of staying a hr if given pain medication; Pt understood.

## 2017-03-16 NOTE — ANCILLARY DISCHARGE INSTRUCTIONS
[facility] Readmission Patient Pamela Silverman    The following concerns the patient's last admission and the events following discharge from the hospital:      Date of last hospital discharge:  3/4/17    Date of Readmission:  3/14/17    Reason for Readmission: abdominal pain       Were you kept informed about your diagnoses during your stay in the hospital and what was being done to further evaluate and treat them? [] None of time   [] Some of time   [] Most of time   [x] All of time   At the time of your discharge, did someone talk to you about:  1. What your diagnoses were? 2. What tests or procedures needed to be done after you left? 3. What to watch out for regarding worsening of your disease? 4. What to do if you were experiencing worsening of your disease? 5. Who to contact (and how) if you were experiencing worsening of your disease? [x] Yes  [] No  [] Not Sure   [x] Yes  [] No  [] Not Sure   [x] Yes  [] No  [] Not Sure   [x] Yes  [] No  [] Not Sure   [x] Yes  [] No  [] Not Sure   Were you asked about your understanding of these instructions? [x] Yes  [] No  [] Not Sure   Were the discharge instructions written down and given to you before you left? [x] Yes  [] No  [] Not Sure   Were the written discharge instructions and plans easy to read and understand? [x] Yes  [] No  [] Not Sure   How confident were you about understanding these instructions? [x] Very confident   [] Somewhat confident   [] Not confident   [] Not Sure   Do you have a regular doctor who takes care of you for most things? [x] Yes  [] No  [] Not Sure   At the time of your discharge, did someone talk to you about which medications to take when you left and which ones to discontinue? [x] Yes  [] No  [] Not Sure   Did you take your medications as they were prescribed? [x] Yes  [] No  [] Not Sure   If not, what were the difficulties you experienced with taking your medications?    Comment: After you left the hospital, did you have an appointment with your doctor? [x] Yes  [] No  [] Not Sure       If yes, who made the appointment? [x] I did    [] My family   [] Hospital staff   [] Not Sure   Were you able to get to this appointment?    [x] Yes  [] No  [] Not Sure   How do you think you became sick enough to be readmitted to the hospital?   Comment:       Abdominal pain where cut is   Source:  Modified from [facility], [place], [place]

## 2017-03-16 NOTE — ROUTINE PROCESS
Bedside and Verbal shift change report given to Mei (oncoming nurse) by Flor Wheatley (offgoing nurse). Report included the following information SBAR, Kardex, Intake/Output and MAR.

## 2017-03-16 NOTE — DISCHARGE INSTRUCTIONS
DISCHARGE SUMMARY from Nurse    The following personal items are in your possession at time of discharge:    Dental Appliances: None  Visual Aid: Glasses     Home Medications: None  Jewelry: None  Clothing: Footwear, Undergarments, Pants, Shirt  Other Valuables: None  Personal Items Sent to Safe: none          PATIENT INSTRUCTIONS:    After general anesthesia or intravenous sedation, for 24 hours or while taking prescription Narcotics:  · Limit your activities  · Do not drive and operate hazardous machinery  · Do not make important personal or business decisions  · Do  not drink alcoholic beverages  · If you have not urinated within 8 hours after discharge, please contact your surgeon on call. Report the following to your surgeon:  · Excessive pain, swelling, redness or odor of or around the surgical area  · Temperature over 100.5  · Nausea and vomiting lasting longer than 4 hours or if unable to take medications  · Any signs of decreased circulation or nerve impairment to extremity: change in color, persistent  numbness, tingling, coldness or increase pain  · Any questions        What to do at Home:  Recommended activity: No lifting, Driving, or Strenuous exercise for 8 weeks , or as stated by your doctor. If you experience any of the following symptoms redness, severe abdominal pain; temperature higher than 100.4 please follow up with doctor. *  Please give a list of your current medications to your Primary Care Provider. *  Please update this list whenever your medications are discontinued, doses are      changed, or new medications (including over-the-counter products) are added. *  Please carry medication information at all times in case of emergency situations.           These are general instructions for a healthy lifestyle:    No smoking/ No tobacco products/ Avoid exposure to second hand smoke    Surgeon General's Warning:  Quitting smoking now greatly reduces serious risk to your health. Obesity, smoking, and sedentary lifestyle greatly increases your risk for illness    A healthy diet, regular physical exercise & weight monitoring are important for maintaining a healthy lifestyle    You may be retaining fluid if you have a history of heart failure or if you experience any of the following symptoms:  Weight gain of 3 pounds or more overnight or 5 pounds in a week, increased swelling in our hands or feet or shortness of breath while lying flat in bed. Please call your doctor as soon as you notice any of these symptoms; do not wait until your next office visit. Recognize signs and symptoms of STROKE:    F-face looks uneven    A-arms unable to move or move unevenly    S-speech slurred or non-existent    T-time-call 911 as soon as signs and symptoms begin-DO NOT go       Back to bed or wait to see if you get better-TIME IS BRAIN. Warning Signs of HEART ATTACK     Call 911 if you have these symptoms:   Chest discomfort. Most heart attacks involve discomfort in the center of the chest that lasts more than a few minutes, or that goes away and comes back. It can feel like uncomfortable pressure, squeezing, fullness, or pain.  Discomfort in other areas of the upper body. Symptoms can include pain or discomfort in one or both arms, the back, neck, jaw, or stomach.  Shortness of breath with or without chest discomfort.  Other signs may include breaking out in a cold sweat, nausea, or lightheadedness. Don't wait more than five minutes to call 911 - MINUTES MATTER! Fast action can save your life. Calling 911 is almost always the fastest way to get lifesaving treatment. Emergency Medical Services staff can begin treatment when they arrive -- up to an hour sooner than if someone gets to the hospital by car. The discharge information has been reviewed with the patient. The patient verbalized understanding.     Discharge medications reviewed with the patient and appropriate educational materials and side effects teaching were provided. DISCHARGE SUMMARY from Nurse    The following personal items are in your possession at time of discharge:    Dental Appliances: None  Visual Aid: Glasses     Home Medications: None  Jewelry: None  Clothing: Footwear, Undergarments, Pants, Shirt  Other Valuables: None  Personal Items Sent to Safe: none          PATIENT INSTRUCTIONS:    After general anesthesia or intravenous sedation, for 24 hours or while taking prescription Narcotics:  · Limit your activities  · Do not drive and operate hazardous machinery  · Do not make important personal or business decisions  · Do  not drink alcoholic beverages  · If you have not urinated within 8 hours after discharge, please contact your surgeon on call. Report the following to your surgeon:  · Excessive pain, swelling, redness or odor of or around the surgical area  · Temperature over 100.5  · Nausea and vomiting lasting longer than 4 hours or if unable to take medications  · Any signs of decreased circulation or nerve impairment to extremity: change in color, persistent  numbness, tingling, coldness or increase pain  · Any questions        What to do at Home:  Recommended activity: Activity as tolerated, no heavy lifting    If you experience any of the following symptoms elevated temperature notify doctor   myalgias     Patient armband removed and shreddedDischarge medications reviewed with the patient and appropriate educational materials and side effects teaching were provided. *  Please give a list of your current medications to your Primary Care Provider. *  Please update this list whenever your medications are discontinued, doses are      changed, or new medications (including over-the-counter products) are added. *  Please carry medication information at all times in case of emergency situations.           These are general instructions for a healthy lifestyle:    No smoking/ No tobacco products/ Avoid exposure to second hand smoke    Surgeon General's Warning:  Quitting smoking now greatly reduces serious risk to your health. Obesity, smoking, and sedentary lifestyle greatly increases your risk for illness    A healthy diet, regular physical exercise & weight monitoring are important for maintaining a healthy lifestyle    You may be retaining fluid if you have a history of heart failure or if you experience any of the following symptoms:  Weight gain of 3 pounds or more overnight or 5 pounds in a week, increased swelling in our hands or feet or shortness of breath while lying flat in bed. Please call your doctor as soon as you notice any of these symptoms; do not wait until your next office visit. Recognize signs and symptoms of STROKE:    F-face looks uneven    A-arms unable to move or move unevenly    S-speech slurred or non-existent    T-time-call 911 as soon as signs and symptoms begin-DO NOT go       Back to bed or wait to see if you get better-TIME IS BRAIN. Warning Signs of HEART ATTACK     Call 911 if you have these symptoms:   Chest discomfort. Most heart attacks involve discomfort in the center of the chest that lasts more than a few minutes, or that goes away and comes back. It can feel like uncomfortable pressure, squeezing, fullness, or pain.  Discomfort in other areas of the upper body. Symptoms can include pain or discomfort in one or both arms, the back, neck, jaw, or stomach.  Shortness of breath with or without chest discomfort.  Other signs may include breaking out in a cold sweat, nausea, or lightheadedness. Don't wait more than five minutes to call 911 - MINUTES MATTER! Fast action can save your life. Calling 911 is almost always the fastest way to get lifesaving treatment. Emergency Medical Services staff can begin treatment when they arrive -- up to an hour sooner than if someone gets to the hospital by car.        The discharge information has been reviewed with the patient. The patient verbalized understanding. Discharge medications reviewed with the patient and appropriate educational materials and side effects teaching were provided.     Patient armband removed and shredded

## 2017-03-21 ENCOUNTER — APPOINTMENT (OUTPATIENT)
Dept: CT IMAGING | Age: 57
End: 2017-03-21
Attending: EMERGENCY MEDICINE
Payer: COMMERCIAL

## 2017-03-21 ENCOUNTER — HOSPITAL ENCOUNTER (EMERGENCY)
Age: 57
Discharge: HOME | End: 2017-03-21
Attending: EMERGENCY MEDICINE | Admitting: EMERGENCY MEDICINE
Payer: COMMERCIAL

## 2017-03-21 VITALS
WEIGHT: 150 LBS | TEMPERATURE: 98.7 F | OXYGEN SATURATION: 97 % | RESPIRATION RATE: 16 BRPM | DIASTOLIC BLOOD PRESSURE: 71 MMHG | BODY MASS INDEX: 25.75 KG/M2 | HEART RATE: 95 BPM | SYSTOLIC BLOOD PRESSURE: 116 MMHG

## 2017-03-21 DIAGNOSIS — K52.9 COLITIS: Primary | ICD-10-CM

## 2017-03-21 LAB
ALBUMIN SERPL BCP-MCNC: 2.3 G/DL (ref 3.4–5)
ALBUMIN/GLOB SERPL: 0.9 {RATIO} (ref 0.8–1.7)
ALP SERPL-CCNC: 78 U/L (ref 45–117)
ALT SERPL-CCNC: 11 U/L (ref 13–56)
ANION GAP BLD CALC-SCNC: 6 MMOL/L (ref 3–18)
APPEARANCE UR: CLEAR
AST SERPL W P-5'-P-CCNC: 9 U/L (ref 15–37)
BACTERIA URNS QL MICRO: ABNORMAL /HPF
BASOPHILS # BLD AUTO: 0 K/UL (ref 0–0.06)
BASOPHILS # BLD: 0 % (ref 0–2)
BILIRUB SERPL-MCNC: 0.3 MG/DL (ref 0.2–1)
BILIRUB UR QL: NEGATIVE
BUN SERPL-MCNC: 16 MG/DL (ref 7–18)
BUN/CREAT SERPL: 21 (ref 12–20)
CALCIUM SERPL-MCNC: 6.6 MG/DL (ref 8.5–10.1)
CAOX CRY URNS QL MICRO: ABNORMAL
CHLORIDE SERPL-SCNC: 117 MMOL/L (ref 100–108)
CO2 SERPL-SCNC: 22 MMOL/L (ref 21–32)
COLOR UR: YELLOW
CREAT SERPL-MCNC: 0.76 MG/DL (ref 0.6–1.3)
DIFFERENTIAL METHOD BLD: ABNORMAL
EOSINOPHIL # BLD: 0.2 K/UL (ref 0–0.4)
EOSINOPHIL NFR BLD: 2 % (ref 0–5)
EPITH CASTS URNS QL MICRO: ABNORMAL /LPF (ref 0–5)
ERYTHROCYTE [DISTWIDTH] IN BLOOD BY AUTOMATED COUNT: 13.3 % (ref 11.6–14.5)
GLOBULIN SER CALC-MCNC: 2.6 G/DL (ref 2–4)
GLUCOSE SERPL-MCNC: 91 MG/DL (ref 74–99)
GLUCOSE UR STRIP.AUTO-MCNC: >1000 MG/DL
HCT VFR BLD AUTO: 41.5 % (ref 35–45)
HGB BLD-MCNC: 13.7 G/DL (ref 12–16)
HGB UR QL STRIP: ABNORMAL
KETONES UR QL STRIP.AUTO: NEGATIVE MG/DL
LACTATE BLD-SCNC: 1.9 MMOL/L (ref 0.4–2)
LEUKOCYTE ESTERASE UR QL STRIP.AUTO: NEGATIVE
LIPASE SERPL-CCNC: 103 U/L (ref 73–393)
LYMPHOCYTES # BLD AUTO: 11 % (ref 21–52)
LYMPHOCYTES # BLD: 1.1 K/UL (ref 0.9–3.6)
MAGNESIUM SERPL-MCNC: 1.6 MG/DL (ref 1.8–2.4)
MCH RBC QN AUTO: 28.7 PG (ref 24–34)
MCHC RBC AUTO-ENTMCNC: 33 G/DL (ref 31–37)
MCV RBC AUTO: 86.8 FL (ref 74–97)
MONOCYTES # BLD: 0.7 K/UL (ref 0.05–1.2)
MONOCYTES NFR BLD AUTO: 7 % (ref 3–10)
MUCOUS THREADS URNS QL MICRO: ABNORMAL /LPF
NEUTS SEG # BLD: 8 K/UL (ref 1.8–8)
NEUTS SEG NFR BLD AUTO: 80 % (ref 40–73)
NITRITE UR QL STRIP.AUTO: NEGATIVE
PH UR STRIP: 5.5 [PH] (ref 5–8)
PLATELET # BLD AUTO: 257 K/UL (ref 135–420)
PMV BLD AUTO: 11.1 FL (ref 9.2–11.8)
POTASSIUM SERPL-SCNC: 2.9 MMOL/L (ref 3.5–5.5)
PROT SERPL-MCNC: 4.9 G/DL (ref 6.4–8.2)
PROT UR STRIP-MCNC: 30 MG/DL
RBC # BLD AUTO: 4.78 M/UL (ref 4.2–5.3)
RBC #/AREA URNS HPF: ABNORMAL /HPF (ref 0–5)
SODIUM SERPL-SCNC: 145 MMOL/L (ref 136–145)
SP GR UR REFRACTOMETRY: >1.03 (ref 1–1.03)
UROBILINOGEN UR QL STRIP.AUTO: 0.2 EU/DL (ref 0.2–1)
WBC # BLD AUTO: 10 K/UL (ref 4.6–13.2)
WBC URNS QL MICRO: ABNORMAL /HPF (ref 0–4)

## 2017-03-21 PROCEDURE — 96361 HYDRATE IV INFUSION ADD-ON: CPT

## 2017-03-21 PROCEDURE — 99285 EMERGENCY DEPT VISIT HI MDM: CPT

## 2017-03-21 PROCEDURE — 74011250637 HC RX REV CODE- 250/637

## 2017-03-21 PROCEDURE — 96375 TX/PRO/DX INJ NEW DRUG ADDON: CPT

## 2017-03-21 PROCEDURE — 74177 CT ABD & PELVIS W/CONTRAST: CPT

## 2017-03-21 PROCEDURE — 96366 THER/PROPH/DIAG IV INF ADDON: CPT

## 2017-03-21 PROCEDURE — 85025 COMPLETE CBC W/AUTO DIFF WBC: CPT | Performed by: EMERGENCY MEDICINE

## 2017-03-21 PROCEDURE — 96376 TX/PRO/DX INJ SAME DRUG ADON: CPT

## 2017-03-21 PROCEDURE — 83690 ASSAY OF LIPASE: CPT | Performed by: EMERGENCY MEDICINE

## 2017-03-21 PROCEDURE — 74011636320 HC RX REV CODE- 636/320: Performed by: EMERGENCY MEDICINE

## 2017-03-21 PROCEDURE — 83605 ASSAY OF LACTIC ACID: CPT

## 2017-03-21 PROCEDURE — 74011250637 HC RX REV CODE- 250/637: Performed by: EMERGENCY MEDICINE

## 2017-03-21 PROCEDURE — 80053 COMPREHEN METABOLIC PANEL: CPT | Performed by: EMERGENCY MEDICINE

## 2017-03-21 PROCEDURE — 96367 TX/PROPH/DG ADDL SEQ IV INF: CPT

## 2017-03-21 PROCEDURE — 96365 THER/PROPH/DIAG IV INF INIT: CPT

## 2017-03-21 PROCEDURE — 81001 URINALYSIS AUTO W/SCOPE: CPT | Performed by: EMERGENCY MEDICINE

## 2017-03-21 PROCEDURE — 83735 ASSAY OF MAGNESIUM: CPT | Performed by: EMERGENCY MEDICINE

## 2017-03-21 PROCEDURE — 74011250636 HC RX REV CODE- 250/636: Performed by: EMERGENCY MEDICINE

## 2017-03-21 RX ORDER — POTASSIUM CHLORIDE 7.45 MG/ML
10 INJECTION INTRAVENOUS ONCE
Status: COMPLETED | OUTPATIENT
Start: 2017-03-21 | End: 2017-03-21

## 2017-03-21 RX ORDER — ONDANSETRON 2 MG/ML
4 INJECTION INTRAMUSCULAR; INTRAVENOUS
Status: COMPLETED | OUTPATIENT
Start: 2017-03-21 | End: 2017-03-21

## 2017-03-21 RX ORDER — HYDROCODONE BITARTRATE AND ACETAMINOPHEN 5; 325 MG/1; MG/1
1-2 TABLET ORAL
Qty: 15 TAB | Refills: 0 | Status: SHIPPED | OUTPATIENT
Start: 2017-03-21 | End: 2017-04-24 | Stop reason: ALTCHOICE

## 2017-03-21 RX ORDER — METRONIDAZOLE 250 MG/1
TABLET ORAL
Status: COMPLETED
Start: 2017-03-21 | End: 2017-03-21

## 2017-03-21 RX ORDER — CIPROFLOXACIN 500 MG/1
500 TABLET ORAL 2 TIMES DAILY
Qty: 14 TAB | Refills: 0 | Status: SHIPPED | OUTPATIENT
Start: 2017-03-21 | End: 2017-03-28

## 2017-03-21 RX ORDER — HYDROMORPHONE HYDROCHLORIDE 1 MG/ML
1 INJECTION, SOLUTION INTRAMUSCULAR; INTRAVENOUS; SUBCUTANEOUS ONCE
Status: COMPLETED | OUTPATIENT
Start: 2017-03-21 | End: 2017-03-21

## 2017-03-21 RX ORDER — METRONIDAZOLE 500 MG/1
500 TABLET ORAL 2 TIMES DAILY
Qty: 14 TAB | Refills: 0 | Status: SHIPPED | OUTPATIENT
Start: 2017-03-21 | End: 2017-03-28

## 2017-03-21 RX ORDER — METRONIDAZOLE 250 MG/1
500 TABLET ORAL 3 TIMES DAILY
Status: DISCONTINUED | OUTPATIENT
Start: 2017-03-21 | End: 2017-03-22 | Stop reason: HOSPADM

## 2017-03-21 RX ORDER — MAGNESIUM SULFATE HEPTAHYDRATE 40 MG/ML
2 INJECTION, SOLUTION INTRAVENOUS ONCE
Status: COMPLETED | OUTPATIENT
Start: 2017-03-21 | End: 2017-03-21

## 2017-03-21 RX ORDER — POTASSIUM CHLORIDE 7.45 MG/ML
10 INJECTION INTRAVENOUS
Status: DISPENSED | OUTPATIENT
Start: 2017-03-21 | End: 2017-03-21

## 2017-03-21 RX ORDER — CIPROFLOXACIN 500 MG/1
500 TABLET ORAL
Status: COMPLETED | OUTPATIENT
Start: 2017-03-21 | End: 2017-03-21

## 2017-03-21 RX ORDER — METRONIDAZOLE 500 MG/1
500 TABLET ORAL 2 TIMES DAILY
Qty: 14 TAB | Refills: 0 | Status: SHIPPED | OUTPATIENT
Start: 2017-03-21 | End: 2017-03-21

## 2017-03-21 RX ORDER — POTASSIUM CHLORIDE 20 MEQ/1
40 TABLET, EXTENDED RELEASE ORAL
Status: COMPLETED | OUTPATIENT
Start: 2017-03-21 | End: 2017-03-21

## 2017-03-21 RX ADMIN — HYDROMORPHONE HYDROCHLORIDE 1 MG: 1 INJECTION, SOLUTION INTRAMUSCULAR; INTRAVENOUS; SUBCUTANEOUS at 16:56

## 2017-03-21 RX ADMIN — METRONIDAZOLE 500 MG: 250 TABLET ORAL at 19:35

## 2017-03-21 RX ADMIN — IOPAMIDOL 100 ML: 612 INJECTION, SOLUTION INTRAVENOUS at 15:34

## 2017-03-21 RX ADMIN — POTASSIUM CHLORIDE 40 MEQ: 20 TABLET, EXTENDED RELEASE ORAL at 16:53

## 2017-03-21 RX ADMIN — METRONIDAZOLE 500 MG: 250 TABLET ORAL at 18:11

## 2017-03-21 RX ADMIN — POTASSIUM CHLORIDE 10 MEQ: 7.46 INJECTION, SOLUTION INTRAVENOUS at 16:59

## 2017-03-21 RX ADMIN — HYDROMORPHONE HYDROCHLORIDE 1 MG: 1 INJECTION, SOLUTION INTRAMUSCULAR; INTRAVENOUS; SUBCUTANEOUS at 19:39

## 2017-03-21 RX ADMIN — ONDANSETRON 4 MG: 2 INJECTION INTRAMUSCULAR; INTRAVENOUS at 13:57

## 2017-03-21 RX ADMIN — HYDROMORPHONE HYDROCHLORIDE 1 MG: 1 INJECTION, SOLUTION INTRAMUSCULAR; INTRAVENOUS; SUBCUTANEOUS at 13:57

## 2017-03-21 RX ADMIN — ONDANSETRON 4 MG: 2 INJECTION INTRAMUSCULAR; INTRAVENOUS at 19:36

## 2017-03-21 RX ADMIN — CIPROFLOXACIN HYDROCHLORIDE 500 MG: 500 TABLET, FILM COATED ORAL at 18:11

## 2017-03-21 RX ADMIN — POTASSIUM CHLORIDE 10 MEQ: 7.46 INJECTION, SOLUTION INTRAVENOUS at 18:12

## 2017-03-21 RX ADMIN — SODIUM CHLORIDE 1000 ML: 900 INJECTION, SOLUTION INTRAVENOUS at 13:56

## 2017-03-21 RX ADMIN — ONDANSETRON 4 MG: 2 INJECTION INTRAMUSCULAR; INTRAVENOUS at 16:55

## 2017-03-21 RX ADMIN — MAGNESIUM SULFATE HEPTAHYDRATE 2 G: 40 INJECTION, SOLUTION INTRAVENOUS at 19:42

## 2017-03-21 NOTE — ED NOTES
Purposeful rounding completed:    Side rails up x 1:  YES  Bed in low position and wheels locked: YES  Call bell within reach: YES  Comfort addressed: YES    Toileting needs addressed: YES  Plan of care reviewed/updated with patient and or family members: YES  IV site assessed: YES  Pain assessed and addressed: YES, 7

## 2017-03-21 NOTE — ED PROVIDER NOTES
HPI Comments: 1:04 PM Brent Fermin is a 62 y.o. female with a history of DM, HTN, and anemia, who presents to the ED for evaluation of abdominal pain, which has been intermittent since she was discharged from the hospital, where Dr. Mary Vann performed a surgery for a bowel obstruction, 5 days ago. She states that she also has been having nausea and dark brown diarrhea. She denies any vomiting, bloody stool, fever, or dysuria. There are no other concerns at this time. Patient is a 62 y.o. female presenting with abdominal pain and bleeding. The history is provided by the patient. Abdominal Pain    Associated symptoms include diarrhea and nausea. Pertinent negatives include no fever, no vomiting, no dysuria, no hematuria, no headaches, no myalgias and no chest pain. Post-Op Problem   Associated symptoms include abdominal pain. Pertinent negatives include no chest pain, no headaches and no shortness of breath. Past Medical History:   Diagnosis Date    Abdominal adhesions     Anemia     Arthritis     all over the body    Asthma     Asthma     Diabetes ([address]     Diabetes mellitus     Dyskinesia     bilateral    Essential hypertension     GERD (gastroesophageal reflux disease)     Hypertension     Menopause     Stool color black        Past Surgical History:   Procedure Laterality Date    HX CHOLECYSTECTOMY  6/28/10    HX HERNIA REPAIR      HX HYSTERECTOMY      Fibroids    HX KNEE REPLACEMENT      HX OOPHORECTOMY      TISSUE LOCALIZATION-EXCISION           Family History:   Problem Relation Age of Onset    Hypertension Other      parent    Breast Cancer Other 21    Heart Disease Father     Hypertension Father     Hypertension Other      sibling    Diabetes Other      parent       Social History     Social History    Marital status:      Spouse name: N/A    Number of children: N/A    Years of education: N/A     Occupational History    Not on file.      Social History Main Topics    Smoking status: Never Smoker    Smokeless tobacco: Never Used    Alcohol use No    Drug use: No    Sexual activity: Yes     Partners: Male     Birth control/ protection: None     Other Topics Concern    Not on file     Social History Narrative         ALLERGIES: Macrodantin [nitrofurantoin macrocrystalline] and Tape [adhesive]    Review of Systems   Constitutional: Negative for chills, fatigue and fever. HENT: Negative for congestion, rhinorrhea and sore throat. Respiratory: Negative for cough and shortness of breath. Cardiovascular: Negative for chest pain and palpitations. Gastrointestinal: Positive for abdominal pain, diarrhea and nausea. Negative for blood in stool and vomiting. Genitourinary: Negative for dysuria, hematuria and urgency. Musculoskeletal: Negative for myalgias. Skin: Negative for rash and wound. Neurological: Negative for dizziness and headaches. All other systems reviewed and are negative. Vitals:    03/21/17 1230 03/21/17 1430 03/21/17 1630   BP: 136/87 113/70 111/63   Pulse: 95     Resp: 16     Temp: 98.7 °F (37.1 °C)     SpO2: 100% 99% 99%   Weight: 68 kg (150 lb)     100% on RA, indicating adequate oxygenation. Physical Exam   Constitutional: She is oriented to person, place, and time. She appears well-developed and well-nourished. No distress. HENT:   Head: Normocephalic and atraumatic. Mouth/Throat: Oropharynx is clear and moist.   Eyes: Conjunctivae and EOM are normal. Pupils are equal, round, and reactive to light. No scleral icterus. Neck: Normal range of motion. Neck supple. Cardiovascular: Normal rate, regular rhythm and normal heart sounds. No murmur heard. Pulmonary/Chest: Effort normal and breath sounds normal. No respiratory distress. Abdominal: Soft. Bowel sounds are normal. She exhibits no distension. There is tenderness (diffuse). Musculoskeletal: She exhibits no edema.    Lymphadenopathy:     She has no cervical adenopathy. Neurological: She is alert and oriented to person, place, and time. Coordination normal.   Skin: Skin is warm and dry. No rash noted. Psychiatric: She has a normal mood and affect. Her behavior is normal.   Nursing note and vitals reviewed. MDM  Number of Diagnoses or Management Options  Colitis:   Diagnosis management comments: S/p small bowel resection 2/21 for possible sbo with fluid collection post operatively discharged 3/14 now with increased abd pain tenderness    Ct reviewed after discussion will start cipro and flagyl with outpt follow up with Dr Vj Lr and/or Complexity of Data Reviewed  Clinical lab tests: ordered and reviewed  Tests in the radiology section of CPT®: ordered and reviewed  Independent visualization of images, tracings, or specimens: yes    Risk of Complications, Morbidity, and/or Mortality  Presenting problems: high  Diagnostic procedures: moderate  Management options: moderate      ED Course       Procedures      Lab findings:  Recent Results (from the past 12 hour(s))   POC LACTIC ACID    Collection Time: 03/21/17  1:51 PM   Result Value Ref Range    Lactic Acid (POC) 1.9 0.4 - 2.0 mmol/L   CBC WITH AUTOMATED DIFF    Collection Time: 03/21/17  2:05 PM   Result Value Ref Range    WBC 10.0 4.6 - 13.2 K/uL    RBC 4.78 4.20 - 5.30 M/uL    HGB 13.7 12.0 - 16.0 g/dL    HCT 41.5 35.0 - 45.0 %    MCV 86.8 74.0 - 97.0 FL    MCH 28.7 24.0 - 34.0 PG    MCHC 33.0 31.0 - 37.0 g/dL    RDW 13.3 11.6 - 14.5 %    PLATELET 165 735 - 123 K/uL    MPV 11.1 9.2 - 11.8 FL    NEUTROPHILS 80 (H) 40 - 73 %    LYMPHOCYTES 11 (L) 21 - 52 %    MONOCYTES 7 3 - 10 %    EOSINOPHILS 2 0 - 5 %    BASOPHILS 0 0 - 2 %    ABS. NEUTROPHILS 8.0 1.8 - 8.0 K/UL    ABS. LYMPHOCYTES 1.1 0.9 - 3.6 K/UL    ABS. MONOCYTES 0.7 0.05 - 1.2 K/UL    ABS. EOSINOPHILS 0.2 0.0 - 0.4 K/UL    ABS.  BASOPHILS 0.0 0.0 - 0.06 K/UL    DF AUTOMATED     URINALYSIS W/ RFLX MICROSCOPIC    Collection Time: 03/21/17  2:05 PM   Result Value Ref Range    Color YELLOW      Appearance CLEAR      Specific gravity >1.030 (H) 1.005 - 1.030    pH (UA) 5.5 5.0 - 8.0      Protein 30 (A) NEG mg/dL    Glucose >1000 (A) NEG mg/dL    Ketone NEGATIVE  NEG mg/dL    Bilirubin NEGATIVE  NEG      Blood LARGE (A) NEG      Urobilinogen 0.2 0.2 - 1.0 EU/dL    Nitrites NEGATIVE  NEG      Leukocyte Esterase NEGATIVE  NEG     URINE MICROSCOPIC ONLY    Collection Time: 03/21/17  2:05 PM   Result Value Ref Range    WBC 0 to 3 0 - 4 /hpf    RBC 11 to 20 0 - 5 /hpf    Epithelial cells 1+ 0 - 5 /lpf    Bacteria 1+ (A) NEG /hpf    Mucus 3+ (A) NEG /lpf    CA Oxalate crystals 2+ (A) NEG   LIPASE    Collection Time: 03/21/17  2:47 PM   Result Value Ref Range    Lipase 103 73 - 869 U/L   METABOLIC PANEL, COMPREHENSIVE    Collection Time: 03/21/17  2:47 PM   Result Value Ref Range    Sodium 145 136 - 145 mmol/L    Potassium 2.9 (LL) 3.5 - 5.5 mmol/L    Chloride 117 (H) 100 - 108 mmol/L    CO2 22 21 - 32 mmol/L    Anion gap 6 3.0 - 18 mmol/L    Glucose 91 74 - 99 mg/dL    BUN 16 7.0 - 18 MG/DL    Creatinine 0.76 0.6 - 1.3 MG/DL    BUN/Creatinine ratio 21 (H) 12 - 20      GFR est AA >60 >60 ml/min/1.73m2    GFR est non-AA >60 >60 ml/min/1.73m2    Calcium 6.6 (L) 8.5 - 10.1 MG/DL    Bilirubin, total 0.3 0.2 - 1.0 MG/DL    ALT (SGPT) 11 (L) 13 - 56 U/L    AST (SGOT) 9 (L) 15 - 37 U/L    Alk. phosphatase 78 45 - 117 U/L    Protein, total 4.9 (L) 6.4 - 8.2 g/dL    Albumin 2.3 (L) 3.4 - 5.0 g/dL    Globulin 2.6 2.0 - 4.0 g/dL    A-G Ratio 0.9 0.8 - 1.7     MAGNESIUM    Collection Time: 03/21/17  2:47 PM   Result Value Ref Range    Magnesium 1.6 (L) 1.8 - 2.4 mg/dL         X-Ray, CT or other radiology findings or impressions:  CT ABD PELV W CONT   Final Result   IMPRESSION:        1. Nonspecific findings of persistent enteric transmural thickening/edema with  perienteric and mesenteric stranding.  No findings that may represent mild  transverse colonic edema without dilatation. Diagnostic considerations may  include infectious/inflammatory etiologies or unresolved postsurgical changes. No findings of obstruction.        2. Stable postsurgical localized pelvic fluid collection, without significant  short interval change. Potentially representing postsurgical inclusion  collections.     3. Mild increased ventral abdominal and pelvic soft tissue stranding that may be  nonspecific anasarca. No findings of an abdominal wall organized abscess or  fluid collection. Orders  Orders Placed This Encounter    CT ABD PELV W CONT     Standing Status:   Standing     Number of Occurrences:   1     Order Specific Question:   Transport     Answer:   Stretcher [5]     Order Specific Question:   Is Patient Allergic to Contrast Dye? Answer:   No     Order Specific Question:   Type of Contrast     Answer:   IV     Order Specific Question:   Does patient have history of Renal Disease?      Answer:   No     Order Specific Question:   Oral Contrast     Answer:   Per Radiologist Protocol    CBC WITH AUTOMATED DIFF     Standing Status:   Standing     Number of Occurrences:   1    LIPASE     Standing Status:   Standing     Number of Occurrences:   1    URINALYSIS W/ RFLX MICROSCOPIC     Standing Status:   Standing     Number of Occurrences:   1    URINE MICROSCOPIC ONLY     Standing Status:   Standing     Number of Occurrences:   1    METABOLIC PANEL, COMPREHENSIVE     Standing Status:   Standing     Number of Occurrences:   1    MAGNESIUM     Standing Status:   Standing     Number of Occurrences:   1    POC LACTIC ACID     Standing Status:   Standing     Number of Occurrences:   1    POC LACTIC ACID     Standing Status:   Standing     Number of Occurrences:   1    sodium chloride 0.9 % bolus infusion 1,000 mL    HYDROmorphone (PF) (DILAUDID) injection 1 mg    ondansetron (ZOFRAN) injection 4 mg    iopamidol (ISOVUE 300) 61 % contrast injection 100 mL    iopamidol (ISOVUE 300) 61 % contrast injection 100 mL    potassium chloride 10 mEq in 100 ml IVPB    HYDROmorphone (PF) (DILAUDID) injection 1 mg    ondansetron (ZOFRAN) injection 4 mg    magnesium sulfate 2 g/50 ml IVPB (premix or compounded)    potassium chloride (K-DUR, KLOR-CON) SR tablet 40 mEq    ciprofloxacin HCl (CIPRO) tablet 500 mg     Order Specific Question:   Antibiotic Indications     Answer:   Intra-Abdominal Infection    metroNIDAZOLE (FLAGYL) tablet 500 mg     Order Specific Question:   Antibiotic Indications     Answer:   Intra-Abdominal Infection    ciprofloxacin HCl (CIPRO) 500 mg tablet     Sig: Take 1 Tab by mouth two (2) times a day for 7 days. Dispense:  14 Tab     Refill:  0    metroNIDAZOLE (FLAGYL) 500 mg tablet     Sig: Take 1 Tab by mouth two (2) times a day for 7 days. Dispense:  14 Tab     Refill:  0    IP CONSULT TO GENERAL SURGERY     Standing Status:   Standing     Number of Occurrences:   1     Order Specific Question:   Reason for Consult: Answer:   continued post op pain     Order Specific Question:   Did you call or speak to the consulting provider? Answer:   No     Order Specific Question:   Consult To     Answer:   dr Claris Crigler:   Schedule When? Answer:   TODAY           Progress notes, Consult notes or additional Procedure notes:   4:59 PM Consult: I discussed care with Dr. Carley Gama, surgeon. It was a standard discussion, including history of patients chief complaint, available diagnostic results, and treatment course. He recommends putting the patient on Cippro and Flagyl, and having the patient follow up with him. Re-evaluation:  5:00 PM I have assessed the patient, who will be discharged in stable condition. I've given the patient precautions to return to the emergency room if there are any new or worsening conditions. I've also instructed the patient to follow up regarding their visit today.  Patient has verbalized understanding and agreement with treatment plan, plan to discharge, and follow-up. All questions were answered at this time. Disposition:  Diagnosis:   1. Colitis        Disposition: Discharge    Follow-up Information     Follow up With Details Comments Contact Brant Jeff MD Call For follow-up. [id number] [address] EMERGENCY DEPT Go to If symptoms worsen [address] [phone]  [phone]           Patient's Medications   Start Taking    CIPROFLOXACIN HCL (CIPRO) 500 MG TABLET    Take 1 Tab by mouth two (2) times a day for 7 days. METRONIDAZOLE (FLAGYL) 500 MG TABLET    Take 1 Tab by mouth two (2) times a day for 7 days. Continue Taking    ALBUTEROL (PROVENTIL HFA, VENTOLIN HFA, PROAIR HFA) 90 MCG/ACTUATION INHALER    Take 1 Puff by inhalation every six (6) hours as needed for Wheezing. AMLODIPINE (NORVASC) 2.5 MG TABLET    Take 2.5 mg by mouth nightly. ATORVASTATIN (LIPITOR) 20 MG TABLET    Take 40 mg by mouth daily. BISOPROLOL-HYDROCHLOROTHIAZIDE (ZIAC) 2.5-6.25 MG PER TABLET    Take 1 Tab by mouth daily. DAPAGLIFLOZIN (FARXIGA) 10 MG TAB    Take  by mouth daily. Indications: type 2 diabetes mellitus    DOCUSATE SODIUM (COLACE) 100 MG CAPSULE    Take 1 Cap by mouth two (2) times a day for 90 days. ESCITALOPRAM OXALATE (LEXAPRO) 10 MG TABLET    Take 1 Tab by mouth daily. METFORMIN (GLUMETZA) 500 MG TG24 24 HOUR TABLET    Take 500 mg by mouth two (2) times a day. OMEPRAZOLE (PRILOSEC) 10 MG CAPSULE    Take 10 mg by mouth daily. ONDANSETRON HCL (ZOFRAN, AS HYDROCHLORIDE,) 4 MG TABLET    Take 1 Tab by mouth every eight (8) hours as needed for Nausea. ONDANSETRON HCL (ZOFRAN, AS HYDROCHLORIDE,) 4 MG TABLET    Take 1 Tab by mouth every eight (8) hours as needed for Nausea.    These Medications have changed    No medications on file   Stop Taking    No medications on file Scribe Attestation:   Tejal Del Toro acting as a scribe for and in the presence of Dr. Makenna Espinoza MD     Signed by: Jeremy Alfaro, March 21, 2017 at 5:01 PM     Provider Attestation:   I personally performed the services described in the documentation, reviewed the documentation, as recorded by the scribe in my presence, and it accurately and completely records my words and actions.      Reviewed and signed by:  Dr. Makenna Espinoza MD

## 2017-03-21 NOTE — ED NOTES
Bedside and Verbal shift change report given to Rebekah Rodríguez RN  (oncoming nurse) by Rishabh Carter RN (offgoing nurse). Report included the following information SBAR.

## 2017-03-21 NOTE — ED NOTES
I performed a brief evaluation, including history and physical, of the patient here in triage and I have determined that pt will need further treatment and evaluation from the main side ER physician. I have placed initial orders to help in expediting patients care. March 21, 2017 at 12:27 PM - Tawanda Hoffmann DO      abd pain, hx of recent ex-lap and admission for pain.

## 2017-03-21 NOTE — ED TRIAGE NOTES
Severe abdominal pain continues after abdominal surgery. Has been back in hospital twice since. First time for infection then ulcer.

## 2017-03-21 NOTE — ED NOTES
Purposeful rounding completed:    Side rails up x 2:  YES  Bed in low position and wheels locked: YES  Call bell within reach: YES  Comfort addressed: YES    Toileting needs addressed: YES  Plan of care reviewed/updated with patient and or family members: YES  IV site assessed: YES  Pain assessed and addressed: YES, 8

## 2017-03-21 NOTE — ED NOTES
Purposeful rounding completed:    Side rails up x 1:  YES  Bed in low position and wheels locked: YES  Call bell within reach: YES  Comfort addressed: YES    Toileting needs addressed: YES  Plan of care reviewed/updated with patient and or family members: YES  IV site assessed: YES  Pain assessed and addressed: YES  Pt states  is going to drive home.

## 2017-03-22 ENCOUNTER — OFFICE VISIT (OUTPATIENT)
Dept: SURGERY | Age: 57
End: 2017-03-22

## 2017-03-22 VITALS
HEIGHT: 64 IN | SYSTOLIC BLOOD PRESSURE: 132 MMHG | DIASTOLIC BLOOD PRESSURE: 73 MMHG | BODY MASS INDEX: 26.73 KG/M2 | HEART RATE: 96 BPM | TEMPERATURE: 98.7 F | RESPIRATION RATE: 18 BRPM | WEIGHT: 156.6 LBS | OXYGEN SATURATION: 99 %

## 2017-03-22 DIAGNOSIS — Z09 POSTOPERATIVE EXAMINATION: Primary | ICD-10-CM

## 2017-03-22 NOTE — PROGRESS NOTES
Patient seen and examined. She was in the Er yesterday. Doing well. Her pain is better. Abdomen is soft and non-tender. Wound is healing well. Continue with Po flagyl and cipro. Follow up in 2 weeks.

## 2017-03-22 NOTE — MR AVS SNAPSHOT
Visit Information Date & Time Provider Department Dept. Phone Encounter #  
 3/22/2017  8:15 AM Debbie Garza MD [facility] [phone] [id number] Your Appointments 3/27/2017 11:15 AM  
New Patient with Marlin Marc MD  
[address] [facility]) Appt Note: NP-HTN Diabetes Asthma CPE id ins card med list arr 30 prior loc confirmed  syb 03/22/17  
 [address]  
  
   
 [address] [facility] [address] Upcoming Health Maintenance Date Due Hepatitis C Screening 1960 LIPID PANEL Q1 1960 FOOT EXAM Q1 2/4/1970 MICROALBUMIN Q1 2/4/1970 EYE EXAM RETINAL OR DILATED Q1 2/4/1970 Pneumococcal 19-64 Medium Risk (1 of 1 - PPSV23) 2/4/1979 DTaP/Tdap/Td series (1 - Tdap) 2/4/1981 PAP AKA CERVICAL CYTOLOGY 2/4/1981 FOBT Q 1 YEAR AGE 50-75 2/4/2010 HEMOGLOBIN A1C Q6M 12/27/2016 BREAST CANCER SCRN MAMMOGRAM 1/6/2019 Allergies as of 3/22/2017  Review Complete On: 3/22/2017 By: Nadira Dsouza Severity Noted Reaction Type Reactions Macrodantin [Nitrofurantoin Macrocrystalline]  01/31/2012    Itching Tape [Adhesive]  09/30/2014    Other (comments) Burned skin when had wound vac Current Immunizations  Reviewed on 2/22/2017 Name Date Influenza Vaccine 11/15/2016 Not reviewed this visit Vitals BP Pulse Temp Resp Height(growth percentile) Weight(growth percentile) 132/73 (BP 1 Location: Right arm, BP Patient Position: Sitting) 96 98.7 °F (37.1 °C) (Oral) 18 5' 4\" (1.626 m) 156 lb 9.6 oz (71 kg) SpO2 BMI OB Status Smoking Status 99% 26.88 kg/m2 Hysterectomy Never Smoker BMI and BSA Data Body Mass Index Body Surface Area  
 26.88 kg/m 2 1.79 m 2 Preferred Pharmacy Pharmacy Name Phone  [facility]  [facility] [phone] Your Updated Medication List  
  
   
This list is accurate as of: 3/22/17  8:50 AM.  Always use your most recent med list.  
  
  
  
  
 albuterol 90 mcg/actuation inhaler Commonly known as:  PROVENTIL HFA, VENTOLIN HFA, PROAIR HFA Take 1 Puff by inhalation every six (6) hours as needed for Wheezing. amLODIPine 2.5 mg tablet Commonly known as:  Gerard Jim Take 2.5 mg by mouth nightly. ciprofloxacin HCl 500 mg tablet Commonly known as:  CIPRO Take 1 Tab by mouth two (2) times a day for 7 days. docusate sodium 100 mg capsule Commonly known as:  Miranda Lowers Take 1 Cap by mouth two (2) times a day for 90 days. escitalopram oxalate 10 mg tablet Commonly known as:  Malena Skates Take 1 Tab by mouth daily. FARXIGA 10 mg Tab Generic drug:  dapagliflozin Take  by mouth daily. Indications: type 2 diabetes mellitus GLUMETZA 500 mg Tg24 24 hour tablet Generic drug:  metFORMIN Take 500 mg by mouth two (2) times a day. HYDROcodone-acetaminophen 5-325 mg per tablet Commonly known as:  Jonathan Walker Take 1-2 Tabs by mouth every four (4) hours as needed for Pain. Max Daily Amount: 12 Tabs. LIPITOR 20 mg tablet Generic drug:  atorvastatin Take 40 mg by mouth daily. metroNIDAZOLE 500 mg tablet Commonly known as:  FLAGYL Take 1 Tab by mouth two (2) times a day for 7 days. omeprazole 10 mg capsule Commonly known as:  PRILOSEC Take 10 mg by mouth daily. * ondansetron hcl 4 mg tablet Commonly known as:  ZOFRAN (AS HYDROCHLORIDE) Take 1 Tab by mouth every eight (8) hours as needed for Nausea. * ondansetron hcl 4 mg tablet Commonly known as:  ZOFRAN (AS HYDROCHLORIDE) Take 1 Tab by mouth every eight (8) hours as needed for Nausea. ZIAC 2.5-6.25 mg per tablet Generic drug:  bisoprolol-hydroCHLOROthiazide Take 1 Tab by mouth daily. * Notice:   This list has 2 medication(s) that are the same as other medications prescribed for you. Read the directions carefully, and ask your doctor or other care provider to review them with you. Patient Instructions If you have any questions or concerns about today's appointment, the verbal and/or written instructions you were given for follow up care, please call our office at [phone]. [facility] [place], [address] 
 
[phone] office [phone] fax Introducing [facility]! [employer] introduces [employer] patient portal. Now you can access parts of your medical record, email your doctor's office, and request medication refills online. 1. In your internet browser, go to [email or web address] 2. Click on the First Time User? Click Here link in the Sign In box. You will see the New Member Sign Up page. 3. Enter your [employer] Access Code exactly as it appears below. You will not need to use this code after youve completed the sign-up process. If you do not sign up before the expiration date, you must request a new code. · [employer] Access Code: [email or web address] Expires: 4/22/2017  2:41 PM 
 
4. Enter the last four digits of your Social Security Number (xxxx) and Date of Birth (mm/dd/yyyy) as indicated and click Submit. You will be taken to the next sign-up page. 5. Create a [employer] ID. This will be your [employer] login ID and cannot be changed, so think of one that is secure and easy to remember. 6. Create a [employer] password. You can change your password at any time. 7. Enter your Password Reset Question and Answer. This can be used at a later time if you forget your password. 8. Enter your e-mail address. You will receive e-mail notification when new information is available in [address] 9. Click Sign Up. You can now view and download portions of your medical record.  
10. Click the Download Summary menu link to download a portable copy of your medical information. If you have questions, please visit the Frequently Asked Questions section of the [employer] website. Remember, [employer] is NOT to be used for urgent needs. For medical emergencies, dial 911. Now available from your iPhone and Android! Please provide this summary of care documentation to your next provider. Your primary care clinician is listed as N Valma Harada. If you have any questions after today's visit, please call [phone].

## 2017-03-22 NOTE — ED NOTES
Pt seen and evaluated by dr Mónica Youssef, dc home with abx. Pt req pain meds as well.  Will write for 15 yumiko Dee MD

## 2017-03-22 NOTE — PROGRESS NOTES
Brent Fermin is a 62 y.o. female who presents today for a second post-op exam for an exploratory laparotomy, extensive lysis of adhesions, & small bowel resection with primary anastomosis performed on 02/21/17. Patient scores their post-op pain level on a pain scale from 1-10  as a 7 today & still c/o persistent nausea. She denies any constipation    1. Have you been to the ER, urgent care clinic since your last visit? Hospitalized since your last visit? Yes, [facility] on 03/21/17 for abdominal pain, nausea    2. Have you seen or consulted any other health care providers outside of the [address] [place] [address] since your last visit? Include any pap smears or colon screening.  No

## 2017-03-22 NOTE — LETTER
NOTIFICATION RETURN TO WORK / SCHOOL 
 
 
 
3/22/2017 8:50 AM 
 
Ms. Tierney Cristobal [address] [phone] To Whom It May Concern: 
 
Tierney Cristobal is currently under the care of Lizz Osborne. She may return to work/school on: May 1, 2017 with no restrictions. If there are questions or concerns please have the patient contact our office. Sincerely, Floyd Couch MD

## 2017-03-22 NOTE — DISCHARGE INSTRUCTIONS
Colitis: Care Instructions  Your Care Instructions  Colitis is the medical term for swelling (inflammation) of the intestine. It can be caused by different things, such as an infection or loss of blood flow in the intestine. Other causes are problems like Crohn's disease or ulcerative colitis. Symptoms may include fever, diarrhea that may be bloody, or belly pain. Sometimes symptoms go away without treatment. But you may need treatment or more tests, such as blood tests or a stool test. Or you may need imaging tests like a CT scan or a colonoscopy. In some cases, the doctor may want to test a sample of tissue from the intestine. This test is called a biopsy. The doctor has checked you carefully, but problems can develop later. If you notice any problems or new symptoms, get medical treatment right away. Follow-up care is a key part of your treatment and safety. Be sure to make and go to all appointments, and call your doctor if you are having problems. It's also a good idea to know your test results and keep a list of the medicines you take. How can you care for yourself at home? · Rest until you feel better. · Your doctor may recommend that you eat bland foods. These include rice, dry toast or crackers, bananas, and applesauce. · To prevent dehydration, drink plenty of fluids. Choose water and other caffeine-free clear liquids until you feel better. If you have kidney, heart, or liver disease and have to limit fluids, talk with your doctor before you increase the amount of fluids you drink. · Be safe with medicines. Take your medicines exactly as prescribed. Call your doctor if you think you are having a problem with your medicine. You will get more details on the specific medicines your doctor prescribes. When should you call for help? Call 911 anytime you think you may need emergency care. For example, call if:  · You passed out (lost consciousness).   · You vomit blood or what looks like coffee grounds. · Your stools are maroon or very bloody. Call your doctor now or seek immediate medical care if:  · You have new or worse pain. · You have a new or higher fever. · You have new or worse symptoms. · You cannot keep fluids or medicines down. Watch closely for changes in your health, and be sure to contact your doctor if:  · You do not get better as expected. Where can you learn more? Go to http://betzy-didi.info/. [place] in the search box to learn more about \"Colitis: Care Instructions. \"  Current as of: August 9, 2016  Content Version: 11.1  © 8260-2267 [employer]. Care instructions adapted under license by [employer] (which disclaims liability or warranty for this information). If you have questions about a medical condition or this instruction, always ask your healthcare professional. [address] any warranty or liability for your use of this information.

## 2017-03-22 NOTE — PATIENT INSTRUCTIONS
If you have any questions or concerns about today's appointment, the verbal and/or written instructions you were given for follow up care, please call our office at [phone].     [facility] - [address]    [phone] office  [phone] fax

## 2017-03-27 ENCOUNTER — OFFICE VISIT (OUTPATIENT)
Dept: FAMILY MEDICINE CLINIC | Age: 57
End: 2017-03-27

## 2017-03-27 VITALS
HEIGHT: 64 IN | HEART RATE: 68 BPM | DIASTOLIC BLOOD PRESSURE: 73 MMHG | RESPIRATION RATE: 16 BRPM | SYSTOLIC BLOOD PRESSURE: 106 MMHG | BODY MASS INDEX: 25.37 KG/M2 | TEMPERATURE: 97.1 F | OXYGEN SATURATION: 98 % | WEIGHT: 148.6 LBS

## 2017-03-27 DIAGNOSIS — G89.29 CHRONIC ABDOMINAL PAIN: Primary | ICD-10-CM

## 2017-03-27 DIAGNOSIS — Z87.19 H/O SMALL BOWEL OBSTRUCTION: ICD-10-CM

## 2017-03-27 DIAGNOSIS — R10.9 CHRONIC ABDOMINAL PAIN: Primary | ICD-10-CM

## 2017-03-27 DIAGNOSIS — I10 ESSENTIAL HYPERTENSION: ICD-10-CM

## 2017-03-27 DIAGNOSIS — E78.2 MIXED HYPERLIPIDEMIA: ICD-10-CM

## 2017-03-27 DIAGNOSIS — E11.65 TYPE 2 DIABETES MELLITUS WITH HYPERGLYCEMIA, WITHOUT LONG-TERM CURRENT USE OF INSULIN (HCC): ICD-10-CM

## 2017-03-27 NOTE — PROGRESS NOTES
1. Have you been to the ER, urgent care clinic since your last visit? Hospitalized since your last visit? NO    2. Have you seen or consulted any other health care providers outside of the [address] [place] [address] since your last visit? Include any pap smears or colon screening.  NO

## 2017-03-27 NOTE — PROGRESS NOTES
Tamar Hardy is a 62 y.o.  female and presents with    Chief Complaint   Patient presents with    Abdominal Pain    Medication Refill           Subjective:  Abdominal Pain  Patient complains of abdominal pain. The pain is described as cramping, and is 7/10 in intensity. Pain is located in the diffusely without radiation. Onset was several years ago. Symptoms have been unchanged since. Aggravating factors: movement. Alleviating factors: none. Associated symptoms: diarrhea. The patient denies nausea and vomiting. She has had ovarian removed and partial bowel obstruction and hernia and adhesion takedown. Her last surgery was 1 month ago due to partial bowel obstruction. Hypertension  Patient is here for evaluation of hypertension. Age at onset of elevated blood pressure:  47.  She indicates that she is feeling well and denies any symptoms referable to her elevated blood pressure. Specifically denies chest pain, palpitations, dyspnea, orthopnea, PND or peripheral edema. Current medication regimen is as listed   below. Patient has these side effects of medication: none. Cardiovascular risk factors: diabetes mellitus, hypertension Use of agents associated with hypertension: none History of renal disease: negative  History of flank trauma: negative      Diabetes Mellitus:  She has diabetes mellitus, and  hypertension and hyperlipidemia. Diabetic ROS - medication compliance: compliant all of the time, diabetic diet compliance: compliant most of the time, home glucose monitoring: is not performed. Lab review: orders written for new lab studies as appropriate; see orders. Asthma Review:  The patient is being seen for follow up of asthma, not currently in exacerbation. Asthma symptoms occur: infrequently. Wheezing when present is described as mild and easily relieved with rescue bronchodilator. Current limitations in activity from asthma: none.   Number of days of school or work missed in the last month: 0. Frequency of use of quick-relief meds: once a week. Regimen compliance: The patient reports adherence to this regimen. Patient does not smoke cigarettes. Patient Active Problem List   Diagnosis Code    Osteoarthritis of left knee M17.9    Hypertension I10    Hyperlipidemia E78.5    GERD (gastroesophageal reflux disease) K21.9    Asthma J45.909    Type 2 diabetes mellitus ([facility] E11.9    SBO (small bowel obstruction) ([facility] K56.69    Pelvic abscess in female N73.9    Abdominal pain R10.9    Post-operative pain G89.18     Patient Active Problem List    Diagnosis Date Noted    Abdominal pain 03/14/2017    Post-operative pain 03/14/2017    Pelvic abscess in female 03/02/2017    [facility] obstruction[facility] 02/21/2017    Osteoarthritis of left knee 06/27/2016    Hypertension 06/27/2016    Hyperlipidemia 06/27/2016    GERD (gastroesophageal reflux disease) 06/27/2016    Asthma 06/27/2016    Type 2 diabetes mellitus ([facility] 06/27/2016     Current Outpatient Prescriptions   Medication Sig Dispense Refill    ciprofloxacin HCl (CIPRO) 500 mg tablet Take 1 Tab by mouth two (2) times a day for 7 days. 14 Tab 0    metroNIDAZOLE (FLAGYL) 500 mg tablet Take 1 Tab by mouth two (2) times a day for 7 days. 14 Tab 0    escitalopram oxalate (LEXAPRO) 10 mg tablet Take 1 Tab by mouth daily. 30 Tab 0    ondansetron hcl (ZOFRAN, AS HYDROCHLORIDE,) 4 mg tablet Take 1 Tab by mouth every eight (8) hours as needed for Nausea. 30 Tab 1    dapagliflozin (FARXIGA) 10 mg tab Take  by mouth daily. Indications: type 2 diabetes mellitus      docusate sodium (COLACE) 100 mg capsule Take 1 Cap by mouth two (2) times a day for 90 days. (Patient taking differently: Take 100 mg by mouth as needed.) 60 Cap 2    ondansetron hcl (ZOFRAN, AS HYDROCHLORIDE,) 4 mg tablet Take 1 Tab by mouth every eight (8) hours as needed for Nausea.  20 Tab 0    amLODIPine (NORVASC) 2.5 mg tablet Take 2.5 mg by mouth nightly.  bisoprolol-hydrochlorothiazide (ZIAC) 2.5-6.25 mg per tablet Take 1 Tab by mouth daily.  atorvastatin (LIPITOR) 20 mg tablet Take 40 mg by mouth daily.  omeprazole (PRILOSEC) 10 mg capsule Take 10 mg by mouth daily.  albuterol (PROVENTIL HFA, VENTOLIN HFA, PROAIR HFA) 90 mcg/actuation inhaler Take 1 Puff by inhalation every six (6) hours as needed for Wheezing.  metFORMIN (GLUMETZA) 500 mg TG24 24 hour tablet Take 500 mg by mouth two (2) times a day.  HYDROcodone-acetaminophen (NORCO) 5-325 mg per tablet Take 1-2 Tabs by mouth every four (4) hours as needed for Pain. Max Daily Amount: 12 Tabs.  15 Tab 0     Allergies   Allergen Reactions    Macrodantin [Nitrofurantoin Macrocrystalline] Itching    Tape [Adhesive] Other (comments)     Burned skin when had wound vac     Past Medical History:   Diagnosis Date    Abdominal adhesions     Anemia     Arthritis     all over the body    Asthma     Asthma     [facility]     Diabetes mellitus     Dyskinesia     bilateral    Essential hypertension     GERD (gastroesophageal reflux disease)     Hypertension     Menopause     Stool color black      Past Surgical History:   Procedure Laterality Date    HX CHOLECYSTECTOMY  6/28/10    HX HERNIA REPAIR      HX HYSTERECTOMY      Fibroids    HX KNEE REPLACEMENT      HX OOPHORECTOMY      TISSUE LOCALIZATION-EXCISION       Family History   Problem Relation Age of Onset    Hypertension Other      parent    Breast Cancer Other 21    Heart Disease Father     Hypertension Father     Hypertension Other      sibling    Diabetes Other      parent     Social History   Substance Use Topics    Smoking status: Never Smoker    Smokeless tobacco: Never Used    Alcohol use No       ROS   General ROS: negative for - chills, fever or weight gain; she lost weight last year but work-up was negative  Psychological ROS: negative for - anxiety or suicidal ideation  Ophthalmic ROS: positive for - uses glasses  ENT ROS: positive for - headaches  negative for - nasal congestion, sinus pain or sore throat  Endocrine ROS: negative for - polydipsia/polyuria or temperature intolerance  Respiratory ROS: no cough, shortness of breath, or wheezing  Cardiovascular ROS: no chest pain or dyspnea on exertion  Genito-Urinary ROS: no dysuria, trouble voiding, or hematuria  Musculoskeletal ROS: positive for - joint pain in the left knee; followed by Dr. Cezar Rich  Neurological ROS: no TIA or stroke symptoms; she has had syncopal episodes with most recent 2 years ago  Dermatological ROS: negative for - rash or skin lesion changes    All other systems reviewed and are negative. Objective:  Vitals:    03/27/17 1114   BP: 106/73   Pulse: 68   Resp: 16   Temp: 97.1 °F (36.2 °C)   TempSrc: Oral   SpO2: 98%   Weight: 148 lb 9.6 oz (67.4 kg)   Height: 5' 4\" (1.626 m)   PainSc:   7   PainLoc: Knee       alert, well appearing, and in no distress, oriented to person, place, and time and normal appearing weight  Mental status - normal mood, behavior, speech, dress, motor activity, and thought processes  Chest - clear to auscultation, no wheezes, rales or rhonchi, symmetric air entry  Heart - normal rate, regular rhythm, normal S1, S2, no murmurs, rubs, clicks or gallops  Abdomen - soft, nondistended, no masses or organomegaly  tenderness noted diffuse; incision site well-healed  Extremities - peripheral pulses normal, right foot pedal edema localized to lateral malleolus; no clubbing or cyanosis  Skin - small area of dehisence   Diabetic foot exam:     Left: Filament test normal sensation with micro filament   Pulse DP: 2+ (normal)   Deformities: None  Right: Filament test reduced sensation with micro filament   Pulse DP: 2+ (normal)   Deformities: None    Assessment/Plan:    1. Chronic abdominal pain  S/p multiple abdominal surgeries; followed by gastroenterology with recent start of bentyl    2.  H/O small bowel obstruction  Followed by Dr. Velia Oneill    3. Essential hypertension  Goal < 140/90; well controlled with current medications    4. Mixed hyperlipidemia  Control uncertain; tolerating medications  - LIPID PANEL; Future  - LIPID PANEL    5. Type 2 diabetes mellitus with hyperglycemia, without long-term current use of insulin (HCC)  Goal Hgb a1c < 7; control uncertain; mildly abnormal right foot exam  -  DIABETES FOOT EXAM  - HEMOGLOBIN A1C WITH EAG; Future  - MICROALBUMIN, UR, RAND W/ MICROALBUMIN/CREA RATIO; Future  - HEMOGLOBIN A1C WITH EAG  - MICROALBUMIN, UR, RAND W/ MICROALBUMIN/CREA RATIO  Lab review: orders written for new lab studies as appropriate; see orders      I have discussed the diagnosis with the patient and the intended plan as seen in the above orders. The patient has received an after-visit summary and questions were answered concerning future plans. I have discussed medication side effects and warnings with the patient as well. I have reviewed the plan of care with the patient, accepted their input and they are in agreement with the treatment goals.      Follow-up Disposition:  Return in about 1 month (around 4/27/2017) for annual exam.

## 2017-03-27 NOTE — ANCILLARY DISCHARGE INSTRUCTIONS
[facility]  Discharge Phone Call       After-Care Discharge Phone Call Questions:    Were you able to get your prescriptions filled? Comment:      [x] Yes  []No    Comment if answer is \"No\"   Are you taking your medication(s) as your doctor ordered? Do you understand the purpose of your medications? Comment:    [x] Yes  []No    Comment if answer is \"No\"   Are you taking any other medications that are not on the list?  Comment:      [x] Yes  []No    Comment if answer is \"Yes\"   Do you have any questions about your medications? No  Are you aware of potential side effects? Yes   Comment:    [x] Yes  [x]No    Comment if answer is \"Yes\"   Did you make your follow-up appointments (if the hospital did not do this before  discharge)? Comment:    [x] Yes  []No    Comment if answer is \"No\"   Is there any reason you might not be able to keep your follow-up appointments? Comment:     [] Yes  [x]No    Comment if answer is \"Yes\"   Do you have any questions about your care plan? No  Are you aware of what health problems to be alert for? Yes   Comment:    [x] Yes  [x]No    Comment if answer is \"Yes\"   Do you have a good understanding of how you should manage your health? Comment:    [x] Yes  []No    Comment if answer is \"Yes\"   Do you know which symptoms to watch for that would mean you would need to call your doctor right away? Comment:      [x] Yes  []No    Comment if answer is \"No\"   Do you have any questions about the follow up process or any instructions that we have provided? Comment:    [] Yes  [x]No    Comment if answer is \"Yes\"   Did staff take your preferences into account?         [x] Yes  []No    Comment if answer is \"Yes\"

## 2017-03-27 NOTE — MR AVS SNAPSHOT
Visit Information Date & Time Provider Department Dept. Phone Encounter #  
 3/27/2017 11:15 AM Michelle Arellano, [address] [facility] [phone] [id number] Follow-up Instructions Return in about 1 month (around 4/27/2017) for annual exam. Upcoming Health Maintenance Date Due Hepatitis C Screening 1960 LIPID PANEL Q1 1960 FOOT EXAM Q1 2/4/1970 MICROALBUMIN Q1 2/4/1970 EYE EXAM RETINAL OR DILATED Q1 2/4/1970 Pneumococcal 19-64 Medium Risk (1 of 1 - PPSV23) 2/4/1979 DTaP/Tdap/Td series (1 - Tdap) 2/4/1981 PAP AKA CERVICAL CYTOLOGY 2/4/1981 HEMOGLOBIN A1C Q6M 12/27/2016 BREAST CANCER SCRN MAMMOGRAM 1/6/2019 COLONOSCOPY 3/3/2026 Allergies as of 3/27/2017  Review Complete On: 3/27/2017 By: Michelle Arellano MD  
  
 Severity Noted Reaction Type Reactions Macrodantin [Nitrofurantoin Macrocrystalline]  01/31/2012    Itching Tape [Adhesive]  09/30/2014    Other (comments) Burned skin when had wound vac Current Immunizations  Reviewed on 2/22/2017 Name Date Influenza Vaccine 11/15/2016 Not reviewed this visit You Were Diagnosed With   
  
 Codes Comments Chronic abdominal pain    -  Primary ICD-10-CM: R10.9, G89.29 ICD-9-CM: 789.00, 338.29   
 H/O small bowel obstruction     ICD-10-CM: Z87.19 ICD-9-CM: V12.79 Essential hypertension     ICD-10-CM: I10 
ICD-9-CM: 401.9 Mixed hyperlipidemia     ICD-10-CM: E78.2 ICD-9-CM: 272.2 Type 2 diabetes mellitus with hyperglycemia, without long-term current use of insulin (HCC)     ICD-10-CM: E11.65 ICD-9-CM: 250.00, 790.29 Vitals BP Pulse Temp Resp Height(growth percentile) Weight(growth percentile) 106/73 (BP 1 Location: Left arm, BP Patient Position: Sitting) 68 97.1 °F (36.2 °C) (Oral) 16 5' 4\" (1.626 m) 148 lb 9.6 oz (67.4 kg) SpO2 BMI OB Status Smoking Status 98% 25.51 kg/m2 Hysterectomy Never Smoker BMI and BSA Data Body Mass Index Body Surface Area 25.51 kg/m 2 1.74 m 2 Preferred Pharmacy Pharmacy Name Phone [facility] [address]  [facility] [phone] Your Updated Medication List  
  
   
This list is accurate as of: 3/27/17 12:06 PM.  Always use your most recent med list.  
  
  
  
  
 albuterol 90 mcg/actuation inhaler Commonly known as:  PROVENTIL HFA, VENTOLIN HFA, PROAIR HFA Take 1 Puff by inhalation every six (6) hours as needed for Wheezing. amLODIPine 2.5 mg tablet Commonly known as:  Monk Mullet Take 2.5 mg by mouth nightly. ciprofloxacin HCl 500 mg tablet Commonly known as:  CIPRO Take 1 Tab by mouth two (2) times a day for 7 days. docusate sodium 100 mg capsule Commonly known as:  Lucetta Pathak Take 1 Cap by mouth two (2) times a day for 90 days. escitalopram oxalate 10 mg tablet Commonly known as:  Alyse Gale Take 1 Tab by mouth daily. FARXIGA 10 mg Tab Generic drug:  dapagliflozin Take  by mouth daily. Indications: type 2 diabetes mellitus GLUMETZA 500 mg Tg24 24 hour tablet Generic drug:  metFORMIN Take 500 mg by mouth two (2) times a day. HYDROcodone-acetaminophen 5-325 mg per tablet Commonly known as:  Raul Dolphin Take 1-2 Tabs by mouth every four (4) hours as needed for Pain. Max Daily Amount: 12 Tabs. LIPITOR 20 mg tablet Generic drug:  atorvastatin Take 40 mg by mouth daily. metroNIDAZOLE 500 mg tablet Commonly known as:  FLAGYL Take 1 Tab by mouth two (2) times a day for 7 days. omeprazole 10 mg capsule Commonly known as:  PRILOSEC Take 10 mg by mouth daily. * ondansetron hcl 4 mg tablet Commonly known as:  ZOFRAN (AS HYDROCHLORIDE) Take 1 Tab by mouth every eight (8) hours as needed for Nausea. * ondansetron hcl 4 mg tablet Commonly known as:  ZOFRAN (AS HYDROCHLORIDE) Take 1 Tab by mouth every eight (8) hours as needed for Nausea. ZIAC 2.5-6.25 mg per tablet Generic drug:  bisoprolol-hydroCHLOROthiazide Take 1 Tab by mouth daily. * Notice: This list has 2 medication(s) that are the same as other medications prescribed for you. Read the directions carefully, and ask your doctor or other care provider to review them with you. We Performed the Following  DIABETES FOOT EXAM [7 Custom] Follow-up Instructions Return in about 1 month (around 4/27/2017) for annual exam. To-Do List   
 03/27/2017 Lab:  HEMOGLOBIN A1C WITH EAG   
  
 03/27/2017 Lab:  LIPID PANEL   
  
 03/27/2017 Lab:  MICROALBUMIN, UR, RAND W/ MICROALBUMIN/CREA RATIO Introducing [facility]! Julien Bailon introduces [employer]oo patient portal. Now you can access parts of your medical record, email your doctor's office, and request medication refills online. 1. In your internet browser, go to [email or web address] 2. Click on the First Time User? Click Here link in the Sign In box. You will see the New Member Sign Up page. 3. Enter your Deliveroo Access Code exactly as it appears below. You will not need to use this code after youve completed the sign-up process. If you do not sign up before the expiration date, you must request a new code. · Del[employer]oo Access Code: [email or web address] Expires: 4/22/2017  2:41 PM 
 
4. Enter the last four digits of your Social Security Number (xxxx) and Date of Birth (mm/dd/yyyy) as indicated and click Submit. You will be taken to the next sign-up page. 5. Create a Aldist ID. This will be your Deliveroo login ID and cannot be changed, so think of one that is secure and easy to remember. 6. Create a Deliveroo password. You can change your password at any time. 7. Enter your Password Reset Question and Answer. This can be used at a later time if you forget your password. 8. Enter your e-mail address.  You will receive e-mail notification when new information is available in [facility]. 9. Click Sign Up. You can now view and download portions of your medical record. 10. Click the Download Summary menu link to download a portable copy of your medical information. If you have questions, please visit the Frequently Asked Questions section of the [facility] website. Remember, [facility] is NOT to be used for urgent needs. For medical emergencies, dial 911. Now available from your iPhone and Android! Please provide this summary of care documentation to your next provider. Your primary care clinician is listed as Lester Nevarez. If you have any questions after today's visit, please call [phone].

## 2017-03-28 LAB
AVG GLU, 10930: 135 MG/DL (ref 91–123)
CHOLEST SERPL-MCNC: 131 MG/DL (ref 110–200)
CREATININE, URINE: 85 MG/DL
HBA1C MFR BLD HPLC: 6.3 % (ref 4.8–5.9)
HDLC SERPL-MCNC: 47 MG/DL (ref 40–59)
LDLC SERPL CALC-MCNC: 61 MG/DL (ref 50–99)
MICROALB/CREAT RATIO, 140286: 15.2 MCG/MG OF CREATININE (ref 0–30)
MICROALBUMIN,URINE RANDOM 140054: 12.9 UG/ML (ref 0.1–17)
TRIGL SERPL-MCNC: 113 MG/DL (ref 40–149)
VLDLC SERPL CALC-MCNC: 23 MG/DL (ref 8–30)

## 2017-03-28 NOTE — DISCHARGE SUMMARY
[facility] SUMMARY    Name:  Yomaira Valenzuela  MR#:  [id number]  :  1960  Account #:  [de-identified]  Date of Adm:  2017  Date of Discharge:  2017      ADMITTING DIAGNOSIS: Abdominal pain. HISTORY OF PRESENT ILLNESS: The patient is a 14-year-old  [facility] American female who presented to the emergency room with  abdominal pain. She had recently had previous abdominal surgery. There was concern for potential small-bowel obstruction and she was  admitted for ongoing management. HOSPITAL COURSE: Surgical consultation was obtained and the  patient was managed conservatively. She did not require surgery  during hospitalization. Her abdominal pain continued to improve and by  2017 she was considered to be ready for discharge. She was  discharged home. DIAGNOSIS: Abdominal pain, improved. No evidence for small-bowel  obstruction. CONDITION: Improved. ACTIVITY: Ad tammy. FOLLOWUP: With her primary care provider in 1 week. The patient will  arrange. MEDICATIONS AT THE TIME OF DISCHARGE:  1. Lexapro 10 mg by mouth daily. 2. Colace 100 mg by mouth 2 times daily. 3. Albuterol one puff by inhalation every 6 hours as needed for  wheezing. 4. Norvasc 2.5 mg by mouth at bedtime. 5. Farxiga 10 mg by mouth daily. 6. Metformin 500 mg by mouth 2 times daily. 7. Lipitor 40 mg by mouth daily. 8. Prilosec 10 mg by mouth daily. 9. Zofran 4 mg by mouth every 8 hours as needed for nausea. 10. Ziac 2.5/6.25 one tablet by mouth daily. DIET: Diabetic.         MD Andrea Small / Radha Coronado  D:  2017   08:08  T:  2017   15:37  Job #:  [id number]

## 2017-03-30 ENCOUNTER — TELEPHONE (OUTPATIENT)
Dept: SURGERY | Age: 57
End: 2017-03-30

## 2017-03-30 NOTE — TELEPHONE ENCOUNTER
Patient called and stated she was speaking with the nurse but was accidentally cut off and the call ended. Spoke with Fabiola Contreras, and was advised Nisha was currently with a patient, but to take a message and Nisha will call the patient back when she is available. The patient was okay with this, she asked if Nisha can call her back on her cell [phone], because she has to step out of the house for a bit.      Relayed message to Nisha BARAHONA

## 2017-04-03 ENCOUNTER — HOSPITAL ENCOUNTER (OUTPATIENT)
Dept: PHYSICAL THERAPY | Age: 57
Discharge: HOME | End: 2017-04-03
Payer: COMMERCIAL

## 2017-04-03 PROCEDURE — 97162 PT EVAL MOD COMPLEX 30 MIN: CPT

## 2017-04-03 PROCEDURE — 97110 THERAPEUTIC EXERCISES: CPT

## 2017-04-03 PROCEDURE — 97035 APP MDLTY 1+ULTRASOUND EA 15: CPT

## 2017-04-03 NOTE — PROGRESS NOTES
[address] [facility] PHYSICAL THERAPY AT [facility]   [address] [facility] [address] [facility], [place] [address]  Phone: [phone] Fax: [phone] / [address] [place] [address] PHYSICAL THERAPY SERVICES  Patient Name: Aniya Castillo : 1960   Medical   Diagnosis: Chronic pain of left knee [M25.562, G89.29] Treatment Diagnosis: L knee pain / hx L TKR    Onset Date: 2016     Referral Source: Dina Lacey MD [facility]): 4/3/2017   Prior Hospitalization: See medical history Provider #: [id number]   Prior Level of Function: Functional I    Comorbidities: DM, arthritis, HTN, asthma    Medications: Verified on Patient Summary List   The Plan of Care and following information is based on the information from the initial evaluation.   ========================================================================  Assessment / key information:  Pt is a 62y.o. year old female who presents with co L anterolateral knee pain . Patient has hx of L TKR 2016, but exacerbation of pain since x 2 falls in the snow in . Patient had xrays showing routine healing of TKR. Patient was hospitalized  and multiple subsequent times for bowel surgery and infection leading to limited mobility. Patient denies normal falls/ balance issues. Current deficits include: increased pain to 10/10 at worst, decreased AROM 0-102, decreased knee strength 4- flexion, 3 extension, hip strength 3- flexion with pain, palpable ITB tautness, tenderness and warmth, patellar hypomobility, and moderate swelling. 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 .  Home exercise program initiated on initial evaluation to address these deficits.  Pt would benefit from PT to address these deficits for increased functional mobility and QOL.  ========================================================================  Eval Complexity: History: MEDIUM  Complexity : 1-2 comorbidities / personal factors will impact the outcome/ POC Exam:HIGH Complexity : 4+ Standardized tests and measures addressing body structure, function, activity limitation and / or participation in recreation  Presentation: MEDIUM Complexity : Evolving with changing characteristics  Clinical Decision Making:MEDIUM Complexity : FOTO score of 26-74Overall Complexity:MEDIUM  Problem List: pain affecting function, decrease ROM, decrease strength, edema affecting function, impaired gait/ balance, decrease ADL/ functional abilitiies, decrease activity tolerance, decrease flexibility/ joint mobility and other FOTO 62/100   Treatment Plan may include any combination of the following: Therapeutic exercise, Therapeutic activities, Neuromuscular re-education, Physical agent/modality, Gait/balance training, Manual therapy, Aquatic therapy and Patient education  Patient / Family readiness to learn indicated by: asking questions, trying to perform skills and interest  Persons(s) to be included in education: patient (P)  Barriers to Learning/Limitations: None  Measures taken:    Patient Goal (s): \"no pain hopefully\"   Patient self reported health status: good  Rehabilitation Potential: good   Short Term Goals: To be accomplished in  1  weeks:  1. Pt will be independent and compliant with HEP   Long Term Goals: To be accomplished in  8-12  treatments:  1. Patient will increase FOTO score to 68/100 for indications of increased functional mobility. 2.  Patient will demo 4/5 knee extension strength for ease with floor to stand transfers at work    3. Patient will demo 4/5 knee flexion strength for improved gait mechanics to increase tolerance without pain   4.  Patient will report increased amb tolerance to 4 blocks for progression of activity tolerance Frequency / Duration:   Patient to be seen  2  times per week for 8-12  treatments:  Patient / Caregiver education and instruction: self care, activity modification and exercises  G-Codes (GP): ALLEGRA  Therapist Signature: Cheryl Cleaning PT Date: 3/3/8704   Certification Period: NA Time: 6:45 AM   ========================================================================  I certify that the above Physical Therapy Services are being furnished while the patient is under my care. I agree with the treatment plan and certify that this therapy is necessary. Physician Signature:        Date:       Time:   Please sign and return to [facility] or you may fax the signed copy to [phone]. Thank you.

## 2017-04-03 NOTE — PROGRESS NOTES
PT KNEE EVAL AND TREATMENT    Patient Name: Lyric Henley  Date:4/3/2017  : 1960  [x]  Patient  Verified  Payor: Vandana Gates / Plan: [place] [employer] P[place] / Product Type: PPO /    In time:940  Out time:1025  Total Treatment Time (min): 45  Visit #: 1 of     Treatment Area: Chronic pain of left knee [M25.562, G89.29]    SUBJECTIVE  Pain Level (0-10 scale): (C): 3  (B): 3 (W):  10  Any medication changes, allergies to medications, diagnosis change, or new procedure performed: see summary sheet for update  Subjective functional status/changes:  CHIEF COMPLAINT: Patient reports with co L anterolateral knee pain . Patient has hx of L TKR 2016, but exacerbation of pain since x 2 falls in the snow in . Patient had xrays showing routine healing of TKR. Patient was hospitalized  and multiple subsequent times for bowel surgery and infection leading to limited mobility. Patient denies normal falls/ balance issues. 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       OBJECTIVE  Posture: Mild valgus L     Gait:  antalgic    ROM / Strength                            AROM                     Strength (1-5)    Left Right Left Right   Hip Flexion   3- p! 4+   Knee Flexion 101  4- 5    Extension 0  3 pain in HS  5   Ankle Plantarflexion WNL       Dorsiflexion WNL         Flexibility:   Hamstrings:  90/90 HS test WNL - co lateral knee/ITB pain   Quadriceps: Deborah Test NT   Gastroc:   WNL  Other: ITB tightness     Palpation: lateral joint line pain , warmth to touch - lateral knee , lateral knee tightness     Optional Tests:  Patellar Positioning (Static) WNL  Patellar Tracking hypo     Patellar Mobility poor mobility        Girth Measurements:     Cm at joint line   Left 35   Right  32.5             Other tests/comments:    Manual: NV     Modality (rationale): promote tissue elongation / elasticity and decrease localized warmth  [x]  US - cont to distal ITB - 1.3 w.cm2,  Mhz 8 min with setup   Ice 10 min to ant/ lat knee     Patient Education: [x] Established HEP    [x] POT (minutes) :8    Pain Level (0-10 scale) post treatment: 2  ASSESSMENT  [x]  See Plan of Care    PLAN  [x]  Upgrade activities as tolerated     [x] Other:_ POC  2x8-12    Kyrie Girard, PT 4/3/2017  6:45 AM    Justification for Eval Code Complexity:  Patient History : hx of knee flexion contracture, recent hospitalization   Examination see exam   Clinical Presentation: evolving sec ot hx   Clinical Decision Making : FOTO : 58 /100

## 2017-04-07 ENCOUNTER — HOSPITAL ENCOUNTER (OUTPATIENT)
Dept: PHYSICAL THERAPY | Age: 57
Discharge: HOME | End: 2017-04-07
Payer: COMMERCIAL

## 2017-04-07 PROCEDURE — 97110 THERAPEUTIC EXERCISES: CPT

## 2017-04-07 PROCEDURE — 97140 MANUAL THERAPY 1/> REGIONS: CPT

## 2017-04-07 PROCEDURE — 97035 APP MDLTY 1+ULTRASOUND EA 15: CPT

## 2017-04-07 NOTE — PROGRESS NOTES
PT DAILY TREATMENT NOTE     Patient Name: Maria T Scott  Date:2017  : 1960  [x]  Patient  Verified  Payor: Eddie Perez / Plan: [place] [employer] [place] / Product Type: PPO /    In time: 10:00am  Out time: 10:46am  Total Treatment Time (min): 46  Visit #: 2 of     Treatment Area: Chronic pain of left knee [M25.562, G89.29]    SUBJECTIVE  Pain Level (0-10 scale): 0; aching  Any medication changes, allergies to medications, adverse drug reactions, diagnosis change, or new procedure performed?: [x] No    [] Yes (see summary sheet for update)  Subjective functional status/changes:   [] No changes reported  \"I have been doing the exercises. Since I've had my stomach surgery, it's only hard to do the bridges. I still can't walk for too long until my knee starts bothering me. \"    OBJECTIVE  Modality rationale: improve tissue extensibility, reduce pain, reduce fascial restrictions to improve patient's ability to > tolerance to prolonged ambulation   Min Type Additional Details    [] Estim: []Att   []Unatt        []TENS instruct                  []IFC  []Premod   []NMES                     []Other:  []w/US   []w/ice   []w/heat  Position:  Location:    []  Traction: [] Cervical       []Lumbar                       [] Prone          []Supine                       []Intermittent   []Continuous Lbs:  [] before manual  [] after manual   8 [x]  Ultrasound: [x]Continuous   [] Pulsed                           [x]1MHz   []3MHz Location: (L) ITB   W/cm2: 1.3    []  Iontophoresis with dexamethasone         Location: [] Take home patch   [] In clinic    []  Ice     []  heat  []  Ice massage Position:  Location:    []  Vasopneumatic Device Pressure:       [] lo [] med [] hi   Temperature: [] lo [] med [] hi   [x] Skin assessment post-treatment:  [x]intact []redness- no adverse reaction       []redness  adverse reaction:     28 min Therapeutic Exercise:  [x] See flow sheet: initiated therex per IE   Rationale: increase ROM, increase strength, improve coordination, improve balance and increase proprioception to improve the patients ability to ambulate for prolonged distances, bend, and 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 Pain Level (0-10 scale) post treatment: 0    ASSESSMENT/Changes in Function:   Patient r/o reduced pain levels since initiation of PT last session attributed to US, manual, and initiation of gentle therex per IE. Good tolerance to first f/u treatment with patient req 100% verbal/tactile cueing and demo for proper form/technique with all therex. Palpable fascial restrictions noted along ITB improved, but not fully addressed today. Improved AROM from IE from 0-103deg to 0-110deg today. Patient will continue to benefit from skilled PT services to modify and progress therapeutic interventions, address functional mobility deficits, address ROM deficits, address strength deficits, analyze and address soft tissue restrictions, analyze and cue movement patterns, analyze and modify body mechanics/ergonomics, assess and modify postural abnormalities and address imbalance/dizziness to attain remaining goals. [x]  See Plan of Care  []  See progress note/recertification  []  See Discharge Summary         Progress towards goals / Updated goals:  Short Term Goals: To be accomplished in 1 weeks:  1. Pt will be independent and compliant with HEP.  -Goal met; patient reporting compliance (4/7/17)  Long Term Goals: To be accomplished in 8-12 treatments:  1. Pt will increase FOTO score to 68/100 for indications of increased functional mobility. 2. Pt will demo 4/5 knee extension strength for ease with floor to stand transfers at work   3. Pt will demo 4/5 knee flexion strength for improved gait mechanics to increase tolerance without pain   4.  Pt will report increased amb tolerance to 4 blocks for progression of activity tolerance     PLAN  [x]  Upgrade activities as tolerated     [x]  Continue plan of care  []  Update interventions per flow sheet       []  Discharge due to:_  [x]  Other: assess response to treatment; add prone quad stretch, cont to cue for reduced valgus collapse with forward lunges     Maribell Torres, PTA 4/7/2017

## 2017-04-10 ENCOUNTER — HOSPITAL ENCOUNTER (OUTPATIENT)
Dept: PHYSICAL THERAPY | Age: 57
Discharge: HOME | End: 2017-04-10
Payer: COMMERCIAL

## 2017-04-10 PROCEDURE — 97110 THERAPEUTIC EXERCISES: CPT

## 2017-04-10 PROCEDURE — 97140 MANUAL THERAPY 1/> REGIONS: CPT

## 2017-04-10 NOTE — PROGRESS NOTES
PT DAILY TREATMENT NOTE     Patient Name: Donnell Ny  Date:4/10/2017  : 1960  [x]  Patient  Verified  Payor: Johny Weeks / Plan: [employer] PPO / Product Type: PPO /    In time: 11:00am  Out time: 11:47am  Total Treatment Time (min): 47  Visit #: 3 of     Treatment Area: Chronic pain of left knee [M25.562, G89.29]    SUBJECTIVE  Pain Level (0-10 scale): 0  Any medication changes, allergies to medications, adverse drug reactions, diagnosis change, or new procedure performed?: [x] No    [] Yes (see summary sheet for update)  Subjective functional status/changes:   [] No changes reported  \"I have no pain today, just tightness. I was so sore after last time. \"    OBJECTIVE  Modality rationale: improve tissue extensibility, reduce pain, reduce fascial restrictions to improve patient's ability to > tolerance to prolonged ambulation   Min Type Additional Details    [] Estim: []Att   []Unatt        []TENS instruct                  []IFC  []Premod   []NMES                     []Other:  []w/US   []w/ice   []w/heat  Position:  Location:    []  Traction: [] Cervical       []Lumbar                       [] Prone          []Supine                       []Intermittent   []Continuous Lbs:  [] before manual  [] after manual   Hold sec no pain [x]  Ultrasound: [x]Continuous   [] Pulsed                           [x]1MHz   []3MHz Location: (L) ITB   W/cm2: 1.3    []  Iontophoresis with dexamethasone         Location: [] Take home patch   [] In clinic    []  Ice     []  heat  []  Ice massage Position:  Location:    []  Vasopneumatic Device Pressure:       [] lo [] med [] hi   Temperature: [] lo [] med [] hi   [x] Skin assessment post-treatment:  [x]intact []redness- no adverse reaction       []redness  adverse reaction:     32 min Therapeutic Exercise:  [x] See flow sheet: PD prone quad stretch; minimal changes sec r/o DOMS   Rationale: increase ROM, increase strength, improve coordination, improve balance and increase proprioception to improve the patients ability to ambulate for prolonged distances, bend, and 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 Patient reporting initiation of therex last session caused residual soreness spanning several days, therefore, minimal change to therex today with cont challenge reported with current program. Dense adhesions and TTP along ITB persist, but reduced from last session. Patient will continue to benefit from skilled PT services to modify and progress therapeutic interventions, address functional mobility deficits, address ROM deficits, address strength deficits, analyze and address soft tissue restrictions, analyze and cue movement patterns, analyze and modify body mechanics/ergonomics, assess and modify postural abnormalities and address imbalance/dizziness to attain remaining goals. [x]  See Plan of Care  []  See progress note/recertification  []  See Discharge Summary         Progress towards goals / Updated goals:  Short Term Goals: To be accomplished in 1 weeks:  1. Pt will be independent and compliant with HEP. -Goal met; patient reporting compliance (4/7/17)  Long Term Goals: To be accomplished in 8-12 treatments:  1. Pt will increase FOTO score to 68/100 for indications of increased functional mobility.    2. Pt will demo 4/5 knee extension strength for ease with floor to stand transfers at work 3. Pt will demo 4/5 knee flexion strength for improved gait mechanics to increase tolerance without pain   4.  Pt will report increased amb tolerance to 4 blocks for progression of activity tolerance     PLAN  [x]  Upgrade activities as tolerated     [x]  Continue plan of care  []  Update interventions per flow sheet       []  Discharge due to:_  [x]  Other: add supine 90/90 knee flexion stretch to improve knee mobility; add lateral lunges and begin incorporating closed-chain glut med/min strengthening; reinitiate US if pain returns    Constantine Forth, PTA 4/10/2017

## 2017-04-14 ENCOUNTER — APPOINTMENT (OUTPATIENT)
Dept: PHYSICAL THERAPY | Age: 57
End: 2017-04-14
Payer: COMMERCIAL

## 2017-04-17 ENCOUNTER — HOSPITAL ENCOUNTER (OUTPATIENT)
Dept: PHYSICAL THERAPY | Age: 57
End: 2017-04-17
Payer: COMMERCIAL

## 2017-04-18 ENCOUNTER — HOSPITAL ENCOUNTER (EMERGENCY)
Age: 57
Discharge: HOME | End: 2017-04-18
Attending: EMERGENCY MEDICINE
Payer: COMMERCIAL

## 2017-04-18 ENCOUNTER — APPOINTMENT (OUTPATIENT)
Dept: CT IMAGING | Age: 57
End: 2017-04-18
Attending: PHYSICIAN ASSISTANT
Payer: COMMERCIAL

## 2017-04-18 VITALS
RESPIRATION RATE: 12 BRPM | OXYGEN SATURATION: 99 % | SYSTOLIC BLOOD PRESSURE: 119 MMHG | WEIGHT: 150 LBS | DIASTOLIC BLOOD PRESSURE: 78 MMHG | HEIGHT: 64 IN | TEMPERATURE: 98 F | BODY MASS INDEX: 25.61 KG/M2 | HEART RATE: 70 BPM

## 2017-04-18 DIAGNOSIS — K59.00 CONSTIPATION, UNSPECIFIED CONSTIPATION TYPE: ICD-10-CM

## 2017-04-18 DIAGNOSIS — R10.9 ABDOMINAL PAIN, UNSPECIFIED LOCATION: Primary | ICD-10-CM

## 2017-04-18 LAB
ALBUMIN SERPL BCP-MCNC: 3.3 G/DL (ref 3.4–5)
ALBUMIN/GLOB SERPL: 0.8 {RATIO} (ref 0.8–1.7)
ALP SERPL-CCNC: 112 U/L (ref 45–117)
ALT SERPL-CCNC: 19 U/L (ref 13–56)
ANION GAP BLD CALC-SCNC: 7 MMOL/L (ref 3–18)
APPEARANCE UR: CLEAR
AST SERPL W P-5'-P-CCNC: 14 U/L (ref 15–37)
ATRIAL RATE: 80 BPM
BACTERIA URNS QL MICRO: NEGATIVE /HPF
BASOPHILS # BLD AUTO: 0 K/UL (ref 0–0.06)
BASOPHILS # BLD: 0 % (ref 0–2)
BILIRUB SERPL-MCNC: 0.3 MG/DL (ref 0.2–1)
BILIRUB UR QL: NEGATIVE
BUN SERPL-MCNC: 10 MG/DL (ref 7–18)
BUN/CREAT SERPL: 9 (ref 12–20)
CALCIUM SERPL-MCNC: 9.2 MG/DL (ref 8.5–10.1)
CALCULATED P AXIS, ECG09: 56 DEGREES
CALCULATED R AXIS, ECG10: 57 DEGREES
CALCULATED T AXIS, ECG11: 98 DEGREES
CHLORIDE SERPL-SCNC: 104 MMOL/L (ref 100–108)
CO2 SERPL-SCNC: 30 MMOL/L (ref 21–32)
COLOR UR: YELLOW
CREAT SERPL-MCNC: 1.15 MG/DL (ref 0.6–1.3)
DIAGNOSIS, 93000: NORMAL
DIFFERENTIAL METHOD BLD: ABNORMAL
EOSINOPHIL # BLD: 0.2 K/UL (ref 0–0.4)
EOSINOPHIL NFR BLD: 4 % (ref 0–5)
EPITH CASTS URNS QL MICRO: NORMAL /LPF (ref 0–5)
ERYTHROCYTE [DISTWIDTH] IN BLOOD BY AUTOMATED COUNT: 13.3 % (ref 11.6–14.5)
GLOBULIN SER CALC-MCNC: 3.9 G/DL (ref 2–4)
GLUCOSE BLD STRIP.AUTO-MCNC: 114 MG/DL (ref 70–110)
GLUCOSE SERPL-MCNC: 100 MG/DL (ref 74–99)
GLUCOSE UR STRIP.AUTO-MCNC: >1000 MG/DL
HCT VFR BLD AUTO: 40.9 % (ref 35–45)
HGB BLD-MCNC: 13.6 G/DL (ref 12–16)
HGB UR QL STRIP: ABNORMAL
KETONES UR QL STRIP.AUTO: NEGATIVE MG/DL
LEUKOCYTE ESTERASE UR QL STRIP.AUTO: NEGATIVE
LIPASE SERPL-CCNC: 215 U/L (ref 73–393)
LYMPHOCYTES # BLD AUTO: 31 % (ref 21–52)
LYMPHOCYTES # BLD: 1.5 K/UL (ref 0.9–3.6)
MCH RBC QN AUTO: 28.6 PG (ref 24–34)
MCHC RBC AUTO-ENTMCNC: 33.3 G/DL (ref 31–37)
MCV RBC AUTO: 85.9 FL (ref 74–97)
MONOCYTES # BLD: 0.6 K/UL (ref 0.05–1.2)
MONOCYTES NFR BLD AUTO: 12 % (ref 3–10)
NEUTS SEG # BLD: 2.7 K/UL (ref 1.8–8)
NEUTS SEG NFR BLD AUTO: 53 % (ref 40–73)
NITRITE UR QL STRIP.AUTO: NEGATIVE
P-R INTERVAL, ECG05: 130 MS
PH UR STRIP: 8 [PH] (ref 5–8)
PLATELET # BLD AUTO: 246 K/UL (ref 135–420)
PMV BLD AUTO: 10.5 FL (ref 9.2–11.8)
POTASSIUM SERPL-SCNC: 4 MMOL/L (ref 3.5–5.5)
PROT SERPL-MCNC: 7.2 G/DL (ref 6.4–8.2)
PROT UR STRIP-MCNC: NEGATIVE MG/DL
Q-T INTERVAL, ECG07: 366 MS
QRS DURATION, ECG06: 80 MS
QTC CALCULATION (BEZET), ECG08: 422 MS
RBC # BLD AUTO: 4.76 M/UL (ref 4.2–5.3)
RBC #/AREA URNS HPF: NORMAL /HPF (ref 0–5)
SODIUM SERPL-SCNC: 141 MMOL/L (ref 136–145)
SP GR UR REFRACTOMETRY: 1.02 (ref 1–1.03)
UROBILINOGEN UR QL STRIP.AUTO: 0.2 EU/DL (ref 0.2–1)
VENTRICULAR RATE, ECG03: 80 BPM
WBC # BLD AUTO: 5 K/UL (ref 4.6–13.2)
WBC URNS QL MICRO: NORMAL /HPF (ref 0–4)

## 2017-04-18 PROCEDURE — 99285 EMERGENCY DEPT VISIT HI MDM: CPT

## 2017-04-18 PROCEDURE — 85025 COMPLETE CBC W/AUTO DIFF WBC: CPT | Performed by: PHYSICIAN ASSISTANT

## 2017-04-18 PROCEDURE — 96375 TX/PRO/DX INJ NEW DRUG ADDON: CPT

## 2017-04-18 PROCEDURE — 96376 TX/PRO/DX INJ SAME DRUG ADON: CPT

## 2017-04-18 PROCEDURE — 74011250636 HC RX REV CODE- 250/636: Performed by: PHYSICIAN ASSISTANT

## 2017-04-18 PROCEDURE — 93005 ELECTROCARDIOGRAM TRACING: CPT

## 2017-04-18 PROCEDURE — 80053 COMPREHEN METABOLIC PANEL: CPT | Performed by: PHYSICIAN ASSISTANT

## 2017-04-18 PROCEDURE — 74011636320 HC RX REV CODE- 636/320: Performed by: EMERGENCY MEDICINE

## 2017-04-18 PROCEDURE — 81001 URINALYSIS AUTO W/SCOPE: CPT | Performed by: EMERGENCY MEDICINE

## 2017-04-18 PROCEDURE — 82962 GLUCOSE BLOOD TEST: CPT

## 2017-04-18 PROCEDURE — 96361 HYDRATE IV INFUSION ADD-ON: CPT

## 2017-04-18 PROCEDURE — 83690 ASSAY OF LIPASE: CPT | Performed by: PHYSICIAN ASSISTANT

## 2017-04-18 PROCEDURE — 96374 THER/PROPH/DIAG INJ IV PUSH: CPT

## 2017-04-18 PROCEDURE — 74177 CT ABD & PELVIS W/CONTRAST: CPT

## 2017-04-18 RX ORDER — HYDROMORPHONE HYDROCHLORIDE 1 MG/ML
1 INJECTION, SOLUTION INTRAMUSCULAR; INTRAVENOUS; SUBCUTANEOUS
Status: COMPLETED | OUTPATIENT
Start: 2017-04-18 | End: 2017-04-18

## 2017-04-18 RX ORDER — ONDANSETRON 2 MG/ML
4 INJECTION INTRAMUSCULAR; INTRAVENOUS
Status: COMPLETED | OUTPATIENT
Start: 2017-04-18 | End: 2017-04-18

## 2017-04-18 RX ORDER — ONDANSETRON 4 MG/1
4 TABLET, FILM COATED ORAL
Qty: 12 TAB | Refills: 0 | Status: SHIPPED | OUTPATIENT
Start: 2017-04-18 | End: 2017-06-02

## 2017-04-18 RX ORDER — MAGNESIUM CITRATE
296 SOLUTION, ORAL ORAL
Qty: 1 BOTTLE | Refills: 0 | Status: SHIPPED | OUTPATIENT
Start: 2017-04-18 | End: 2017-04-18

## 2017-04-18 RX ADMIN — HYDROMORPHONE HYDROCHLORIDE 1 MG: 1 INJECTION, SOLUTION INTRAMUSCULAR; INTRAVENOUS; SUBCUTANEOUS at 13:45

## 2017-04-18 RX ADMIN — IOPAMIDOL 90 ML: 612 INJECTION, SOLUTION INTRAVENOUS at 15:01

## 2017-04-18 RX ADMIN — SODIUM CHLORIDE 1000 ML: 900 INJECTION, SOLUTION INTRAVENOUS at 13:46

## 2017-04-18 RX ADMIN — ONDANSETRON 4 MG: 2 INJECTION INTRAMUSCULAR; INTRAVENOUS at 13:45

## 2017-04-18 RX ADMIN — HYDROMORPHONE HYDROCHLORIDE 1 MG: 1 INJECTION, SOLUTION INTRAMUSCULAR; INTRAVENOUS; SUBCUTANEOUS at 15:38

## 2017-04-18 NOTE — ED PROVIDER NOTES
Patient is a 62 y.o. female presenting with abdominal pain and dizziness. The history is provided by the patient. Abdominal Pain      Dizziness     Keyur Plant is a 62 y.o. female presents with c/o abdominal pain, dizziness. Pt has history of recent bowel obstruction surgery. States has not had a bowel movement since last Saturday. Does admit to passing gas. States pain is in a different location than last time. Denies fever admits to nausea and vomiting. Past Medical History:   Diagnosis Date    Abdominal adhesions     Anemia     Arthritis     all over the body    Asthma     Asthma     Diabetes ([facility] [address]     Diabetes mellitus     Dyskinesia     bilateral    Essential hypertension     GERD (gastroesophageal reflux disease)     Hypertension     Menopause     Stool color black        Past Surgical History:   Procedure Laterality Date    HX CHOLECYSTECTOMY  6/28/10    HX COLONOSCOPY      HX ENDOSCOPY      HX HERNIA REPAIR      HX HYSTERECTOMY      Fibroids    HX KNEE REPLACEMENT      HX OOPHORECTOMY      TISSUE LOCALIZATION-EXCISION           Family History:   Problem Relation Age of Onset    Hypertension Other      parent    Breast Cancer Other 21    Heart Disease Father     Hypertension Father     Diabetes Father     Diabetes Mother     Hypertension Other      sibling    Diabetes Other      parent    Diabetes Sister     Kidney Disease Brother     Diabetes Brother        Social History     Social History    Marital status:      Spouse name: N/A    Number of children: N/A    Years of education: N/A     Occupational History    Not on file.      Social History Main Topics    Smoking status: Never Smoker    Smokeless tobacco: Never Used    Alcohol use No    Drug use: No    Sexual activity: Yes     Partners: Male     Birth control/ protection: None     Other Topics Concern    Not on file     Social History Narrative         ALLERGIES: Macrodantin [nitrofurantoin macrocrystalline] and Tape [adhesive]    Review of Systems   Gastrointestinal: Positive for abdominal pain. Neurological: Positive for dizziness. Constitutional:  Denies malaise, fever, chills. Head:  Denies injury. Face:  Denies injury or pain. ENMT:  Denies sore throat. Neck:  Denies injury or pain. Chest:  Denies injury. Cardiac:  Denies chest pain or palpitations. Respiratory:  Denies cough, wheezing, difficulty breathing, shortness of breath. GI/ABD:  Pain Denies injury, distention, nausea, vomiting, diarrhea. :  Denies injury, pain, dysuria or urgency. Back:  Denies injury or pain. Pelvis:  Denies injury or pain. Extremity/MS:  Denies injury or pain. Neuro:  dizziness, Denies headache, LOC,  neurologic symptoms/deficits/paresthesias. Skin: Denies injury, rash, itching or skin changes. Vitals:    04/18/17 1250   BP: (!) 128/92   Pulse: 81   Resp: 18   Temp: 98.1 °F (36.7 °C)   SpO2: 100%   Weight: 68 kg (150 lb)   Height: 5' 4\" (1.626 m)            Physical Exam   Nursing note and vitals reviewed. CONSTITUTIONAL: Alert, in no apparent distress; well-developed; well-nourished. HEAD:  Normocephalic, atraumatic. EYES: PERRL; EOM's intact. ENTM: Nose: No rhinorrhea; Throat: mucous membranes moist. Posterior pharynx-normal.  Neck:  No JVD, supple without lymphadenopathy. RESP: Chest clear, equal breath sounds. CV: S1 and S2 WNL; No murmurs, gallops or rubs. GI: Abdomen soft and RLQ tenderness, +BS,NG,NR. No masses or organomegaly. UPPER EXT:  Normal inspection. LOWER EXT: Normal inspection. NEURO: strength 5/5 and sym, sensation intact. SKIN: No rashes; Normal for age and stage. PSYCH:  Alert and oriented, normal affect.        MDM  Number of Diagnoses or Management Options  Abdominal pain, unspecified location:   Constipation, unspecified constipation type:   Diagnosis management comments: DDx: gastroenteritis, GERD, hernia, hepatitis, pancreatitis, gallbladder etiology, constipation, adhesions, UTI, pyelo, kidney stones, STD,  Wfxb-Wovz-Kisjsn syndrome, preg, ectopic, ovarian cyst, ovarian torsion, tubo-ovarian abscess, appendicitis, diverticulitis, SBO, GI bleed, mesenteric ischemia, AAA, cardiac etiology, musculoskeletal pain/spasm, malignancy  IMPRESSION AND MEDICAL DECISION MAKING:  Based upon the patient's presentation with noted HPI and PE, along with the work up done in the emergency department, I believe that the patient has abdominal pain from constipation. Will treat and have her follow up with her GI. CT does not show obstruction. Labs are reassuring. The patient will be discharged home. Warning signs of worsening condition were discussed and understood by the patient. Based on patient's age, coexisting illness, exam, and the results of this ED evaluation, the decision to treat as an outpatient was made. Based on the information available at time of discharge, acute pathology requiring immediate intervention was deemed relative unlikely. While it is impossible to completely exclude the possibility of underlying serious disease or worsening of condition, I feel the relative likelihood is extremely low. I discussed this uncertainty with the patient, who understood ED evaluation and treatment and felt comfortable with the outpatient treatment plan. All questions regarding care, test results, and follow up were answered. The patient is stable and appropriate to discharge. They understand that they should return to the emergency department for any new or worsening symptoms. I stressed the importance of follow up for repeat assessment and possibly further evaluation/treatment.            Amount and/or Complexity of Data Reviewed  Clinical lab tests: ordered and reviewed  Tests in the radiology section of CPT®: ordered and reviewed  Tests in the medicine section of CPT®: ordered and reviewed  Independent visualization of images, tracings, or specimens: yes    Patient Progress  Patient progress: improved (Pt states that she feels much better after having bowel movement after enema.)    ED Course       Procedures    Vitals:  Patient Vitals for the past 12 hrs:   Temp Pulse Resp BP SpO2   04/18/17 1250 98.1 °F (36.7 °C) 81 18 (!) 128/92 100 %         Medications ordered:   Medications   sodium chloride 0.9 % bolus infusion 1,000 mL (not administered)   HYDROmorphone (PF) (DILAUDID) injection 1 mg (1 mg IntraVENous Given 4/18/17 1345)   ondansetron (ZOFRAN) injection 4 mg (4 mg IntraVENous Given 4/18/17 1345)         Lab findings:  Recent Results (from the past 12 hour(s))   GLUCOSE, POC    Collection Time: 04/18/17 12:52 PM   Result Value Ref Range    Glucose (POC) 114 (H) 70 - 110 mg/dL   EKG, 12 LEAD, INITIAL    Collection Time: 04/18/17 12:58 PM   Result Value Ref Range    Ventricular Rate 80 BPM    Atrial Rate 80 BPM    P-R Interval 130 ms    QRS Duration 80 ms    Q-T Interval 366 ms    QTC Calculation (Bezet) 422 ms    Calculated P Axis 56 degrees    Calculated R Axis 57 degrees    Calculated T Axis 98 degrees    Diagnosis       Normal sinus rhythm  Nonspecific T wave abnormality  Abnormal ECG  When compared with ECG of 20-JUN-2016 14:36,  No significant change was found         EKG interpretation by ED Physician:      X-Ray, CT or other radiology findings or impressions:  No orders to display       Progress notes, Consult notes or additional Procedure notes:       Disposition:  Diagnosis: No diagnosis found. Disposition:   1:45 PM  Pt reevaluated at this time and is resting comfortably in NAD. Discussed results and findings, as well as, diagnosis and plan for discharge. Pt verbalizes understanding and agreement with plan. All questions addressed at this time.      Follow-up Information     None           Patient's Medications   Start Taking    No medications on file   Continue Taking    ALBUTEROL (PROVENTIL HFA, VENTOLIN HFA, PROAIR HFA) 90 MCG/ACTUATION INHALER    Take 1 Puff by inhalation every six (6) hours as needed for Wheezing. AMLODIPINE (NORVASC) 2.5 MG TABLET    Take 2.5 mg by mouth nightly. ATORVASTATIN (LIPITOR) 20 MG TABLET    Take 40 mg by mouth daily. BISOPROLOL-HYDROCHLOROTHIAZIDE (ZIAC) 2.5-6.25 MG PER TABLET    Take 1 Tab by mouth daily. DAPAGLIFLOZIN (FARXIGA) 10 MG TAB    Take  by mouth daily. Indications: type 2 diabetes mellitus    DOCUSATE SODIUM (COLACE) 100 MG CAPSULE    Take 1 Cap by mouth two (2) times a day for 90 days. ESCITALOPRAM OXALATE (LEXAPRO) 10 MG TABLET    Take 1 Tab by mouth daily. HYDROCODONE-ACETAMINOPHEN (NORCO) 5-325 MG PER TABLET    Take 1-2 Tabs by mouth every four (4) hours as needed for Pain. Max Daily Amount: 12 Tabs. METFORMIN (GLUMETZA) 500 MG TG24 24 HOUR TABLET    Take 500 mg by mouth two (2) times a day. OMEPRAZOLE (PRILOSEC) 10 MG CAPSULE    Take 10 mg by mouth daily. ONDANSETRON HCL (ZOFRAN, AS HYDROCHLORIDE,) 4 MG TABLET    Take 1 Tab by mouth every eight (8) hours as needed for Nausea. ONDANSETRON HCL (ZOFRAN, AS HYDROCHLORIDE,) 4 MG TABLET    Take 1 Tab by mouth every eight (8) hours as needed for Nausea.    These Medications have changed    No medications on file   Stop Taking    No medications on file

## 2017-04-18 NOTE — ED NOTES
I have reviewed discharge instructions with the patient. The patient verbalized understanding. Patient in stable condition at discharge. Patient armband removed and given to patient to take home. Patient was informed of the privacy risks if armband lost or stolen. Patient signed paper discharge instructions.

## 2017-04-18 NOTE — ED TRIAGE NOTES
Pt with right sided abdominal pain that started a few days ago. Today became dizziness and lightheaded, no falling or LOC. LBM was Saturday but increased urination.

## 2017-04-18 NOTE — DISCHARGE INSTRUCTIONS
Constipation: Care Instructions  Your Care Instructions  Constipation means that you have a hard time passing stools (bowel movements). People pass stools from 3 times a day to once every 3 days. What is normal for you may be different. Constipation may occur with pain in the rectum and cramping. The pain may get worse when you try to pass stools. Sometimes there are small amounts of bright red blood on toilet paper or the surface of stools. This is because of enlarged veins near the rectum (hemorrhoids). A few changes in your diet and lifestyle may help you avoid ongoing constipation. Your doctor may also prescribe medicine to help loosen your stool. Some medicines can cause constipation. These include pain medicines and antidepressants. Tell your doctor about all the medicines you take. Your doctor may want to make a medicine change to ease your symptoms. Follow-up care is a key part of your treatment and safety. Be sure to make and go to all appointments, and call your doctor if you are having problems. It's also a good idea to know your test results and keep a list of the medicines you take. How can you care for yourself at home? · Drink plenty of fluids, enough so that your urine is light yellow or clear like water. If you have kidney, heart, or liver disease and have to limit fluids, talk with your doctor before you increase the amount of fluids you drink. · Include high-fiber foods in your diet each day. These include fruits, vegetables, beans, and whole grains. · Get at least 30 minutes of exercise on most days of the week. Walking is a good choice. You also may want to do other activities, such as running, swimming, cycling, or playing tennis or team sports. · Take a fiber supplement, such as Citrucel or Metamucil, every day. Read and follow all instructions on the label. · Schedule time each day for a bowel movement. A daily routine may help.  Take your time having your bowel movement. · Support your feet with a small step stool when you sit on the toilet. This helps flex your hips and places your pelvis in a squatting position. · Your doctor may recommend an over-the-counter laxative to relieve your constipation. Examples are Milk of Magnesia and MiraLax. Read and follow all instructions on the label. Do not use laxatives on a long-term basis. When should you call for help? Call your doctor now or seek immediate medical care if:  · You have new or worse belly pain. · You have new or worse nausea or vomiting. · You have blood in your stools. Watch closely for changes in your health, and be sure to contact your doctor if:  · Your constipation is getting worse. · You do not get better as expected. Where can you learn more? Go to http://betzy-didi.info/. Enter 21 [phone] in the search box to learn more about \"Constipation: Care Instructions. \"  Current as of: May 27, 2016  Content Version: 11.2  © 1990-1709 [employer]. Care instructions adapted under license by [employer] (which disclaims liability or warranty for this information). If you have questions about a medical condition or this instruction, always ask your healthcare professional. [place] [address] any warranty or liability for your use of this information.

## 2017-04-18 NOTE — ED NOTES
Assuming care of patient. Patient returned from CT scan. Reports intermittent stabbing pain to abdomen x2 days with nausea. Currently rates pain 8/10. RLQ tender to palpation.

## 2017-04-19 ENCOUNTER — HOSPITAL ENCOUNTER (OUTPATIENT)
Dept: PHYSICAL THERAPY | Age: 57
Discharge: HOME | End: 2017-04-19
Payer: COMMERCIAL

## 2017-04-19 PROCEDURE — 97110 THERAPEUTIC EXERCISES: CPT

## 2017-04-19 PROCEDURE — 97140 MANUAL THERAPY 1/> REGIONS: CPT

## 2017-04-19 NOTE — PROGRESS NOTES
PT DAILY TREATMENT NOTE     Patient Name: Natty Vu  Date:2017  : 1960  [x]  Patient  Verified  Payor: Mikki Guillory / Plan: [place] [employer] [place] / Product Type: PPO /    In time: 10:59am       Out time: 11:43am  Total Treatment Time (min): 44  Visit #: 4 of     Treatment Area: Chronic pain of left knee [M25.562, G89.29]    SUBJECTIVE  Pain Level (0-10 scale): 0  Any medication changes, allergies to medications, adverse drug reactions, diagnosis change, or new procedure performed?: [x] No    [] Yes (see summary sheet for update)  Subjective functional status/changes:   [] No changes reported  \"My stomach is killing me. I had to go back to have them check it. (*pt referring to stomach surgery prior to initiation of PT*) I'll try to do what I can. \"    OBJECTIVE  Modality rationale: improve tissue extensibility, reduce pain, reduce fascial restrictions to improve patient's ability to > tolerance to prolonged ambulation   Min Type Additional Details    [] Estim: []Att   []Unatt        []TENS instruct                  []IFC  []Premod   []NMES                     []Other:  []w/US   []w/ice   []w/heat  Position:  Location:    []  Traction: [] Cervical       []Lumbar                       [] Prone          []Supine                       []Intermittent   []Continuous Lbs:  [] before manual  [] after manual   Hold sec no pain [x]  Ultrasound: [x]Continuous   [] Pulsed                           [x]1MHz   []3MHz Location: (L) ITB   W/cm2: 1.3    []  Iontophoresis with dexamethasone         Location: [] Take home patch   [] In clinic    []  Ice     []  heat  []  Ice massage Position:  Location:    []  Vasopneumatic Device Pressure:       [] lo [] med [] hi   Temperature: [] lo [] med [] hi   [x] Skin assessment post-treatment:  [x]intact []redness- no adverse reaction       []redness  adverse reaction:     29 min Therapeutic Exercise:  [x] See flow sheet: added 90/90 supine quad stretch; PD most therex sec abdominal pain   Rationale: increase ROM, increase strength, improve coordination, improve balance and increase proprioception to improve the patients ability to ambulate for prolonged distances, bend, and 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    15 min Manual Therapy:  supine (L) knee TFL release, MFR/STM/DTM to entirety of ITB, patellar mobs in all directions, PROM with stretch into flexion   Rationale: decrease pain, increase ROM, increase tissue extensibility and decrease trigger points to improve patient's mobility for floor<>stand transfer as necessitated by work duties          X min Patient Education: [x] Review HEP      Other Objective/Functional Measures:    (R) knee AROM: 0-110deg, limited by pain    Pain Level (0-10 scale) post treatment: 0    ASSESSMENT/Changes in Function:   Poor tolerance to therex today due to recent exacerbation of stomach symptoms from prior abdominal surgery. Notable improvement in knee mobility today. Patient will continue to benefit from skilled PT services to modify and progress therapeutic interventions, address functional mobility deficits, address ROM deficits, address strength deficits, analyze and address soft tissue restrictions, analyze and cue movement patterns, analyze and modify body mechanics/ergonomics, assess and modify postural abnormalities and address imbalance/dizziness to attain remaining goals. [x]  See Plan of Care  []  See progress note/recertification  []  See Discharge Summary         Progress towards goals / Updated goals:  Short Term Goals: To be accomplished in 1 weeks:  1. Pt will be independent and compliant with HEP. -Goal met; patient reporting compliance (4/7/17)  Long Term Goals: To be accomplished in 8-12 treatments:  1. Pt will increase FOTO score to 68/100 for indications of increased functional mobility. 2. Pt will demo 4/5 knee extension strength for ease with floor to stand transfers at work   3.  Pt will demo 4/5 knee flexion strength for improved gait mechanics to increase tolerance without pain   4.  Pt will report increased amb tolerance to 4 blocks for progression of activity tolerance     PLAN  [x]  Upgrade activities as tolerated     [x]  Continue plan of care  []  Update interventions per flow sheet       []  Discharge due to:_  [x]  Other:_    Jenny Bear, PTA 4/19/2017

## 2017-04-21 ENCOUNTER — HOSPITAL ENCOUNTER (OUTPATIENT)
Dept: PHYSICAL THERAPY | Age: 57
Discharge: HOME | End: 2017-04-21
Payer: COMMERCIAL

## 2017-04-21 PROCEDURE — 97140 MANUAL THERAPY 1/> REGIONS: CPT

## 2017-04-21 PROCEDURE — 97110 THERAPEUTIC EXERCISES: CPT

## 2017-04-21 PROCEDURE — 97035 APP MDLTY 1+ULTRASOUND EA 15: CPT

## 2017-04-21 NOTE — PROGRESS NOTES
PT DAILY TREATMENT NOTE     Patient Name: Denis Linder  Date:2017  : 1960  [x]  Patient  Verified  Payor: Tommy Kohli / [place] [employer] [place] / Product Type: PPO /    In time: 1101   Out time: 4454  Total Treatment Time (min): 43  Visit #: 5 of     Treatment Area: Chronic pain of left knee [M25.562, G89.29]    SUBJECTIVE  Pain Level (0-10 scale): 0  Any medication changes, allergies to medications, adverse drug reactions, diagnosis change, or new procedure performed?: [x] No    [] Yes (see summary sheet for update)  Subjective functional status/changes:   [] No changes reported  \"Its just still tight and sore on the outside. A little on the top when I bend it. \"    OBJECTIVE  Modality rationale: improve tissue extensibility, reduce pain, reduce fascial restrictions estrellita of ITB  to improve patient's ability to > tolerance to prolonged ambulation   Min Type Additional Details    [] Estim: []Att   []Unatt        []TENS instruct                  []IFC  []Premod   []NMES                     []Other:  []w/US   []w/ice   []w/heat  Position:  Location:    []  Traction: [] Cervical       []Lumbar                       [] Prone          []Supine                       []Intermittent   []Continuous Lbs:  [] before manual  [] after manual   8 [x]  Ultrasound: [x]Continuous   [] Pulsed                           [x]1MHz   []3MHz Location: (L) ITB   W/cm2: 1.4    []  Iontophoresis with dexamethasone         Location: [] Take home patch   [] In clinic    []  Ice     []  heat  []  Ice massage Position:  Location:    []  Vasopneumatic Device Pressure:       [] lo [] med [] hi   Temperature: [] lo [] med [] hi   [x] Skin assessment post-treatment:  [x]intact []redness- no adverse reaction       []redness  adverse reaction:     25 min Therapeutic Exercise:  [x] See flow sheet:   Rationale: increase ROM, increase strength, improve coordination, improve balance and increase proprioception to improve the patients ability to ambulate for prolonged distances, bend, and 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    10 min Manual Therapy:  ITB roller in SL, supine PROM knee flexion, supine ITB stretch    Rationale: decrease pain, increase ROM, increase tissue extensibility and decrease trigger points to improve patient's mobility for floor<>stand transfer as necessitated by work duties          X min Patient Education: [x] Review HEP      Other Objective/Functional Measures:    Lateral knee pain \"soreness\" persists with palpable tautness of distal ITB    LTG 2/3 reviewed     Pain Level (0-10 scale) post treatment: 0    ASSESSMENT/Changes in Function: Patient with decreased stomach pain but sessions cont to be limited by abdominal pain to avoid aggravation. Resume US to distal ITB     Patient will continue to benefit from skilled PT services to modify and progress therapeutic interventions, address functional mobility deficits, address ROM deficits, address strength deficits, analyze and address soft tissue restrictions, analyze and cue movement patterns, analyze and modify body mechanics/ergonomics, assess and modify postural abnormalities and address imbalance/dizziness to attain remaining goals. [x]  See Plan of Care  []  See progress note/recertification  []  See Discharge Summary         Progress towards goals / Updated goals:  Short Term Goals: To be accomplished in 1 weeks:  1. Pt will be independent and compliant with HEP. -Goal met; patient reporting compliance (4/7/17)  Long Term Goals: To be accomplished in 8-12 treatments:  1. Pt will increase FOTO score to 68/100 for indications of increased functional mobility. 2. Pt will demo 4/5 knee extension strength for ease with floor to stand transfers at work - goal progressing slowly sec to activity restriction from stomach problems  4/21/17  3.  Pt will demo 4/5 knee flexion strength for improved gait mechanics to increase tolerance without pain goal progressing slowly sec to activity restriction from stomach problems  4/21/17  4.  Pt will report increased amb tolerance to 4 blocks for progression of activity tolerance     PLAN  [x]  Upgrade activities as tolerated     [x]  Continue plan of care  []  Update interventions per flow sheet       []  Discharge due to:_  [x]  Other:_resume therex per FS as tolerated by stomach     Nan Willett, PT 4/21/2017

## 2017-04-24 ENCOUNTER — HOSPITAL ENCOUNTER (OUTPATIENT)
Dept: PHYSICAL THERAPY | Age: 57
End: 2017-04-24
Payer: COMMERCIAL

## 2017-04-24 ENCOUNTER — OFFICE VISIT (OUTPATIENT)
Dept: FAMILY MEDICINE CLINIC | Age: 57
End: 2017-04-24

## 2017-04-24 VITALS
SYSTOLIC BLOOD PRESSURE: 115 MMHG | RESPIRATION RATE: 16 BRPM | HEIGHT: 64 IN | HEART RATE: 53 BPM | TEMPERATURE: 97 F | BODY MASS INDEX: 25.71 KG/M2 | DIASTOLIC BLOOD PRESSURE: 80 MMHG | OXYGEN SATURATION: 97 % | WEIGHT: 150.6 LBS

## 2017-04-24 DIAGNOSIS — R10.9 CHRONIC ABDOMINAL PAIN: ICD-10-CM

## 2017-04-24 DIAGNOSIS — I10 ESSENTIAL HYPERTENSION: ICD-10-CM

## 2017-04-24 DIAGNOSIS — E11.65 TYPE 2 DIABETES MELLITUS WITH HYPERGLYCEMIA, WITHOUT LONG-TERM CURRENT USE OF INSULIN (HCC): ICD-10-CM

## 2017-04-24 DIAGNOSIS — R22.42 LEG MASS, LEFT: ICD-10-CM

## 2017-04-24 DIAGNOSIS — G89.29 CHRONIC ABDOMINAL PAIN: ICD-10-CM

## 2017-04-24 DIAGNOSIS — M70.61 TROCHANTERIC BURSITIS, RIGHT HIP: Primary | ICD-10-CM

## 2017-04-24 PROBLEM — N73.9 PELVIC ABSCESS IN FEMALE: Status: RESOLVED | Noted: 2017-03-02 | Resolved: 2017-04-24

## 2017-04-24 RX ORDER — TRIAMCINOLONE ACETONIDE 40 MG/ML
40 INJECTION, SUSPENSION INTRA-ARTICULAR; INTRAMUSCULAR ONCE
Qty: 1 ML | Refills: 0
Start: 2017-04-24 | End: 2017-04-24

## 2017-04-24 NOTE — MR AVS SNAPSHOT
Visit Information Date & Time Provider Department Dept. Phone Encounter #  
 4/24/2017  9:45 AM Stephy Sheikh, [address] [facility] [phone] Follow-up Instructions Return in about 1 week (around 5/1/2017), or if symptoms worsen or fail to improve, for results. Upcoming Health Maintenance Date Due Hepatitis C Screening 1960 EYE EXAM RETINAL OR DILATED Q1 2/4/1970 Pneumococcal 19-64 Medium Risk (1 of 1 - PPSV23) 2/4/1979 DTaP/Tdap/Td series (1 - Tdap) 2/4/1981 PAP AKA CERVICAL CYTOLOGY 2/4/1981 HEMOGLOBIN A1C Q6M 9/27/2017 FOOT EXAM Q1 3/27/2018 MICROALBUMIN Q1 3/27/2018 LIPID PANEL Q1 3/27/2018 BREAST CANCER SCRN MAMMOGRAM 1/6/2019 COLONOSCOPY 3/3/2026 Allergies as of 4/24/2017  Review Complete On: 4/24/2017 By: Setphy Sheikh MD  
  
 Severity Noted Reaction Type Reactions Macrodantin [Nitrofurantoin Macrocrystalline]  01/31/2012    Itching Tape [Adhesive]  09/30/2014    Other (comments) Burned skin when had wound vac Current Immunizations  Reviewed on 2/22/2017 Name Date Influenza Vaccine 11/15/2016 Not reviewed this visit You Were Diagnosed With   
  
 Codes Comments Trochanteric bursitis, right hip    -  Primary ICD-10-CM: M70.61 ICD-9-CM: 726.5 Leg mass, left     ICD-10-CM: R22.42 
ICD-9-CM: 782. 2 Chronic abdominal pain     ICD-10-CM: R10.9, G89.29 ICD-9-CM: 789.00, 338.29 Essential hypertension     ICD-10-CM: I10 
ICD-9-CM: 401.9 Type 2 diabetes mellitus with hyperglycemia, without long-term current use of insulin (HCC)     ICD-10-CM: E11.65 ICD-9-CM: 250.00, 790.29 Vitals BP Pulse Temp Resp Height(growth percentile) Weight(growth percentile) 115/80 (BP 1 Location: Left arm, BP Patient Position: Sitting) (!) 53 97 °F (36.1 °C) (Oral) 16 5' 4\" (1.626 m) 150 lb 9.6 oz (68.3 kg) SpO2 BMI OB Status Smoking Status 97% 25.85 kg/m2 Hysterectomy Never Smoker Vitals History BMI and BSA Data Body Mass Index Body Surface Area  
 25.85 kg/m 2 1.76 m 2 Preferred Pharmacy Pharmacy Name Phone [facility] [address], [address]  [facility] [phone] Your Updated Medication List  
  
   
This list is accurate as of: 4/24/17 10:53 AM.  Always use your most recent med list.  
  
  
  
  
 albuterol 90 mcg/actuation inhaler Commonly known as:  PROVENTIL HFA, VENTOLIN HFA, PROAIR HFA Take 1 Puff by inhalation every six (6) hours as needed for Wheezing. amLODIPine 2.5 mg tablet Commonly known as:  Therese Punter Take 2.5 mg by mouth nightly. escitalopram oxalate 10 mg tablet Commonly known as:  Tod Castle Take 1 Tab by mouth daily. FARXIGA 10 mg Tab Generic drug:  dapagliflozin Take  by mouth daily. Indications: type 2 diabetes mellitus GLUMETZA 500 mg Tg24 24 hour tablet Generic drug:  metFORMIN Take 500 mg by mouth two (2) times a day. LIPITOR 20 mg tablet Generic drug:  atorvastatin Take 40 mg by mouth daily. omeprazole 10 mg capsule Commonly known as:  PRILOSEC Take 10 mg by mouth daily. ondansetron hcl 4 mg tablet Commonly known as:  ZOFRAN (AS HYDROCHLORIDE) Take 1 Tab by mouth every eight (8) hours as needed for Nausea. triamcinolone acetonide 40 mg/mL injection Commonly known as:  KENALOG  
1 mL by IntraBURSal route once for 1 dose. ZIAC 2.5-6.25 mg per tablet Generic drug:  bisoprolol-hydroCHLOROthiazide Take 1 Tab by mouth daily. We Performed the Following DRAIN/INJECT LARGE JOINT/BURSA [id number] CPT(R)] TRIAMCINOLONE ACETONIDE INJ [ [facility]] Follow-up Instructions Return in about 1 week (around 5/1/2017), or if symptoms worsen or fail to improve, for results. To-Do List   
 04/24/2017   Imaging:  US EXT NONVAS LT COMP   
  
 04/24/2017 11:00 AM  
 Appointment with Ayanna Saunders PTA at [facility] [address] ([phone])  
  
 04/26/2017 4:30 PM  
  Appointment with Ayanna Saunders PTA at [facility] [address] ([phone])  
  
 04/28/2017 11:00 AM  
  Appointment with Rafael Case, PT at [address] [place] [address] [place] ([phone]) Patient Instructions Hip Bursitis: Exercises Your Care Instructions Here are some examples of typical rehabilitation exercises for your condition. Start each exercise slowly. Ease off the exercise if you start to have pain. Your doctor or physical therapist will tell you when you can start these exercises and which ones will work best for you. How to do the exercises Hip rotator stretch 1. Lie on your back with both knees bent and your feet flat on the floor. 2. Put the ankle of your affected leg on your opposite thigh near your knee. 3. Use your hand to gently push your knee away from your body until you feel a gentle stretch around your hip. 4. Hold the stretch for 15 to 30 seconds. 5. Repeat 2 to 4 times. 6. Repeat steps 1 through 5, but this time use your hand to gently pull your knee toward your opposite shoulder. Iliotibial band stretch 1. Lean sideways against a wall. If you are not steady on your feet, hold on to a chair or counter. 2. Stand on the leg with the affected hip, with that leg close to the wall. Then cross your other leg in front of it. 3. Let your affected hip drop out to the side of your body and against wall. Then lean away from your affected hip until you feel a stretch. 4. Hold the stretch for 15 to 30 seconds. 5. Repeat 2 to 4 times. Straight-leg raises to the outside 1. Lie on your side, with your affected hip on top. 2. Tighten the front thigh muscles of your top leg to keep your knee straight. 3. Keep your hip and your leg straight in line with the rest of your body, and keep your knee pointing forward. Do not drop your hip back. 4. Lift your top leg straight up toward the ceiling, about 12 inches off the floor. Hold for about 6 seconds, then slowly lower your leg. 5. Repeat 8 to 12 times. Clamshell 1. Lie on your side, with your affected hip on top and your head propped on a pillow. Keep your feet and knees together and your knees bent. 2. Raise your top knee, but keep your feet together. Do not let your hips roll back. Your legs should open up like a clamshell. 3. Hold for 6 seconds. 4. Slowly lower your knee back down. Rest for 10 seconds. 5. Repeat 8 to 12 times. Follow-up care is a key part of your treatment and safety. Be sure to make and go to all appointments, and call your doctor if you are having problems. It's also a good idea to know your test results and keep a list of the medicines you take. Where can you learn more? Go to http://betzy-didi.info/. Enter X477 in the search box to learn more about \"Hip Bursitis: Exercises. \" Current as of: May 23, 2016 Content Version: 11.2 © 2030-5490 [employer]. Care instructions adapted under license by [email or web address] (which disclaims liability or warranty for this information). If you have questions about a medical condition or this instruction, always ask your healthcare professional. [address] any warranty or liability for your use of this information. Introducing hospitals & HEALTH SERVICES! [employer] introduces Digital Path patient portal. Now you can access parts of your medical record, email your doctor's office, and request medication refills online. 1. In your internet browser, go to [email or web address] 2. Click on the First Time User? Click Here link in the Sign In box. You will see the New Member Sign Up page. 3. Enter your Digital Path Access Code exactly as it appears below. You will not need to use this code after youve completed the sign-up process.  If you do not sign up before the expiration date, you must request a new code. · Ubiregi Access Code: [email or web address] Expires: 7/23/2017 10:48 AM 
 
4. Enter the last four digits of your Social Security Number (xxxx) and Date of Birth (mm/dd/yyyy) as indicated and click Submit. You will be taken to the next sign-up page. 5. Create a Ubiregi ID. This will be your Ubiregi login ID and cannot be changed, so think of one that is secure and easy to remember. 6. Create a Ubiregi password. You can change your password at any time. 7. Enter your Password Reset Question and Answer. This can be used at a later time if you forget your password. 8. Enter your e-mail address. You will receive e-mail notification when new information is available in [address] 9. Click Sign Up. You can now view and download portions of your medical record. 10. Click the Download Summary menu link to download a portable copy of your medical information. If you have questions, please visit the Frequently Asked Questions section of the Ubiregi website. Remember, Ubiregi is NOT to be used for urgent needs. For medical emergencies, dial 911. Now available from your iPhone and Android! Please provide this summary of care documentation to your next provider. Your primary care clinician is listed as [place]. If you have any questions after today's visit, please call [phone].

## 2017-04-24 NOTE — PROGRESS NOTES
Jeannette Jim is a 62 y.o.  female and presents with    Chief Complaint   Patient presents with    Hip Pain    Abdominal Pain     stomach spasms           Subjective:  Mrs. Bismark York presents for evaluation after ER visit for abdominal pain. she was treated for constipation with an enema and has not had symptoms thereafter. she is drinking plenty of water and is eating increased fiber in her diet. Today she c/o right hip pain which has been present for the past several weeks. When she flexes her hip it increases; she has pain with laying on the right side. She denies injury. Rest improves pain. Abdominal Pain  Patient complains of ongoing abdominal pain. The pain is described as cramping, and is 7/10 in intensity. Pain is located in the diffusely without radiation. Onset was several years ago. Symptoms have been unchanged since. Aggravating factors: movement. Alleviating factors: none. Associated symptoms: diarrhea. The patient denies nausea and vomiting. She has had ovarian removed and partial bowel obstruction and hernia and adhesion takedown. Her last surgery was 1 month ago due to partial bowel obstruction.      Hypertension  Patient is here for evaluation of hypertension. Age at onset of elevated blood pressure: 47. She indicates that she is feeling well and denies any symptoms referable to her elevated blood pressure. Specifically denies chest pain, palpitations, dyspnea, orthopnea, PND or peripheral edema. Current medication regimen is as listed   below. Patient has these side effects of medication: none. Cardiovascular risk factors: diabetes mellitus, hypertension Use of agents associated with hypertension: none History of renal disease: negative  History of flank trauma: negative        Diabetes Mellitus:  She has diabetes mellitus, and hypertension and hyperlipidemia.   Diabetic ROS - medication compliance: compliant all of the time, diabetic diet compliance: compliant most of the time, home glucose monitoring: is not performed. Lab review: orders written for new lab studies as appropriate; see orders. Asthma Review:  The patient is being seen for follow up of asthma, not currently in exacerbation. Asthma symptoms occur: infrequently. Wheezing when present is described as mild and easily relieved with rescue bronchodilator. Current limitations in activity from asthma: none. Number of days of school or work missed in the last month: 0. Frequency of use of quick-relief meds: once a week. Regimen compliance: The patient reports adherence to this regimen. Patient does not smoke cigarettes. Patient Active Problem List   Diagnosis Code    Osteoarthritis of left knee M17.12    Hypertension I10    Hyperlipidemia E78.5    GERD (gastroesophageal reflux disease) K21.9    Asthma J45.909    [facility] E11.9    SBO (small bowel obstruction) ([facility] K56.69    Abdominal pain R10.9    Post-operative pain G89.18     Patient Active Problem List    Diagnosis Date Noted    Abdominal pain 03/14/2017    Post-operative pain 03/14/2017    SBO (small bowel obstruction) ([facility] 02/21/2017    Osteoarthritis of left knee 06/27/2016    Hypertension 06/27/2016    Hyperlipidemia 06/27/2016    GERD (gastroesophageal reflux disease) 06/27/2016    Asthma 06/27/2016    Type 2 diabetes mellitus ([facility] 06/27/2016     Current Outpatient Prescriptions   Medication Sig Dispense Refill    escitalopram oxalate (LEXAPRO) 10 mg tablet Take 1 Tab by mouth daily. 30 Tab 0    dapagliflozin (FARXIGA) 10 mg tab Take  by mouth daily. Indications: type 2 diabetes mellitus      amLODIPine (NORVASC) 2.5 mg tablet Take 2.5 mg by mouth nightly.  bisoprolol-hydrochlorothiazide (ZIAC) 2.5-6.25 mg per tablet Take 1 Tab by mouth daily.  atorvastatin (LIPITOR) 20 mg tablet Take 40 mg by mouth daily.  omeprazole (PRILOSEC) 10 mg capsule Take 10 mg by mouth daily.  albuterol (PROVENTIL HFA, VENTOLIN HFA, PROAIR HFA) 90 mcg/actuation inhaler Take 1 Puff by inhalation every six (6) hours as needed for Wheezing.  metFORMIN (GLUMETZA) 500 mg TG24 24 hour tablet Take 500 mg by mouth two (2) times a day.  ondansetron hcl (ZOFRAN, AS HYDROCHLORIDE,) 4 mg tablet Take 1 Tab by mouth every eight (8) hours as needed for Nausea.  12 Tab 0     Allergies   Allergen Reactions    Macrodantin [Nitrofurantoin Macrocrystalline] Itching    Tape [Adhesive] Other (comments)     Burned skin when had wound vac     Past Medical History:   Diagnosis Date    Abdominal adhesions     Anemia     Arthritis     all over the body    Asthma     Asthma     Diabetes ([facility]     Diabetes mellitus     Dyskinesia     bilateral    Essential hypertension     GERD (gastroesophageal reflux disease)     Hypertension     Menopause     Stool color black      Past Surgical History:   Procedure Laterality Date    HX CHOLECYSTECTOMY  6/28/10    HX COLONOSCOPY      HX ENDOSCOPY      HX HERNIA REPAIR      HX HYSTERECTOMY      Fibroids    HX KNEE REPLACEMENT      HX OOPHORECTOMY      TISSUE LOCALIZATION-EXCISION       Family History   Problem Relation Age of Onset    Hypertension Other      parent    Breast Cancer Other 21    Heart Disease Father     Hypertension Father     Diabetes Father     Diabetes Mother     Hypertension Other      sibling    Diabetes Other      parent    Diabetes Sister     Kidney Disease Brother     Diabetes Brother      Social History   Substance Use Topics    Smoking status: Never Smoker    Smokeless tobacco: Never Used    Alcohol use No       ROS   General ROS: negative for - chills or fever  Psychological ROS: negative for - anxiety or depression  Ophthalmic ROS: positive for - uses glasses  ENT ROS: negative for - headaches, hearing change, tinnitus or vertigo  Endocrine ROS: negative for - polydipsia/polyuria or temperature intolerance  Respiratory ROS: no cough, shortness of breath, or wheezing  Cardiovascular ROS: no chest pain or dyspnea on exertion  Gastrointestinal ROS: no abdominal pain, change in bowel habits, or black or bloody stools  Genito-Urinary ROS: no dysuria, trouble voiding, or hematuria  Neurological ROS: negative for - numbness/tingling or weakness  Dermatological ROS: negative for - rash or skin lesion changes    All other systems reviewed and are negative.       Objective:  Vitals:    04/24/17 1000   BP: 115/80   Pulse: (!) 53   Resp: 16   Temp: 97 °F (36.1 °C)   TempSrc: Oral   SpO2: 97%   Weight: 150 lb 9.6 oz (68.3 kg)   Height: 5' 4\" (1.626 m)   PainSc:   8   PainLoc: Hip       alert, well appearing, and in no distress, oriented to person, place, and time and normal appearing weight  Mental status - normal mood, behavior, speech, dress, motor activity, and thought processes  Chest - clear to auscultation, no wheezes, rales or rhonchi, symmetric air entry  Heart - normal rate, regular rhythm, normal S1, S2, no murmurs, rubs, clicks or gallops  Abdomen - soft, nondistended, no masses or organomegaly  tenderness noted periumbilical  Neurological - antalgic gait   Musculoskeletal - abnormal exam of right hip with TTP over greater trochanter  Abnormal exam of left posterior leg with mass which is TTP and not motile    LABS     TESTS  [facility]  OFFICE PROCEDURE PROGRESS NOTE        Chart reviewed for the following:   I, Carlitos Graf MD, have reviewed the History, Physical and updated the Allergic reactions for [address] performed immediately prior to start of procedure:   Lubna Dove MD, have performed the following reviews on [address] [place] [address] prior to the start of the procedure:            * Patient was identified by name and date of birth   * Agreement on procedure being performed was verified  * Risks and Benefits explained to the patient  * Procedure site verified and marked as necessary  * Patient was positioned for comfort  * Understanding confirmed and consent was signed and verified     Time: .10:46 AM       Date of procedure: 4/24/2017    Procedure performed by:  Torie Mehta MD    Provider assisted by: Dima Leal LPN    Patient assisted by: self    How tolerated by patient: tolerated the procedure well with no complications    Comments: none    after obtaining informed consent the Right hip was prepped in sterile fashion; ethyl chloride used for topical anesthesia; 3 cc 1:1:1 marcaine 0.5%, lidocaine 1% without epi and kenalog 40 mg/cc injected into right trochanteric bursa; EBL < 1 cc; post procedure pain 0/10    Assessment/Plan:    1. Trochanteric bursitis, right hip  Exercises demonstrated after injection administered  - DRAIN/INJECT LARGE JOINT/BURSA  - TRIAMCINOLONE ACETONIDE INJ  - triamcinolone acetonide (KENALOG) 40 mg/mL injection; 1 mL by IntraBURSal route once for 1 dose. Dispense: 1 mL; Refill: 0    2. Leg mass, left  ddx lipoma, cyst  - US EXT NONVAS LT COMP; Future    3. Chronic abdominal pain  Pain management; dicyclomine somewhat effective    4. Essential hypertension  Goal <140/90; controlled with current medication    5. Type 2 diabetes mellitus with hyperglycemia, without long-term current use of insulin (HCC)  Goal hgb a1c <7; continue current medications; encourage healthy diet      Lab review: orders written for new lab studies as appropriate; see orders      I have discussed the diagnosis with the patient and the intended plan as seen in the above orders. The patient has received an after-visit summary and questions were answered concerning future plans. I have discussed medication side effects and warnings with the patient as well. I have reviewed the plan of care with the patient, accepted their input and they are in agreement with the treatment goals.      Follow-up Disposition:  Return in about 1 week (around 5/1/2017), or if symptoms worsen or fail to improve, for results.

## 2017-04-24 NOTE — PATIENT INSTRUCTIONS
Hip Bursitis: Exercises  Your Care Instructions  Here are some examples of typical rehabilitation exercises for your condition. Start each exercise slowly. Ease off the exercise if you start to have pain. Your doctor or physical therapist will tell you when you can start these exercises and which ones will work best for you. How to do the exercises  Hip rotator stretch    1. Lie on your back with both knees bent and your feet flat on the floor. 2. Put the ankle of your affected leg on your opposite thigh near your knee. 3. Use your hand to gently push your knee away from your body until you feel a gentle stretch around your hip. 4. Hold the stretch for 15 to 30 seconds. 5. Repeat 2 to 4 times. 6. Repeat steps 1 through 5, but this time use your hand to gently pull your knee toward your opposite shoulder. Iliotibial band stretch    1. Lean sideways against a wall. If you are not steady on your feet, hold on to a chair or counter. 2. Stand on the leg with the affected hip, with that leg close to the wall. Then cross your other leg in front of it. 3. Let your affected hip drop out to the side of your body and against wall. Then lean away from your affected hip until you feel a stretch. 4. Hold the stretch for 15 to 30 seconds. 5. Repeat 2 to 4 times. Straight-leg raises to the outside    1. Lie on your side, with your affected hip on top. 2. Tighten the front thigh muscles of your top leg to keep your knee straight. 3. Keep your hip and your leg straight in line with the rest of your body, and keep your knee pointing forward. Do not drop your hip back. 4. Lift your top leg straight up toward the ceiling, about 12 inches off the floor. Hold for about 6 seconds, then slowly lower your leg. 5. Repeat 8 to 12 times. Clamshell    1. Lie on your side, with your affected hip on top and your head propped on a pillow. Keep your feet and knees together and your knees bent.   2. Raise your top knee, but keep your feet together. Do not let your hips roll back. Your legs should open up like a clamshell. 3. Hold for 6 seconds. 4. Slowly lower your knee back down. Rest for 10 seconds. 5. Repeat 8 to 12 times. Follow-up care is a key part of your treatment and safety. Be sure to make and go to all appointments, and call your doctor if you are having problems. It's also a good idea to know your test results and keep a list of the medicines you take. Where can you learn more? Go to http://betzy-didi.info/. Enter O487 in the search box to learn more about \"Hip Bursitis: Exercises. \"  Current as of: May 23, 2016  Content Version: 11.2  © 1682-5599 [employer]. Care instructions adapted under license by [employer] (which disclaims liability or warranty for this information). If you have questions about a medical condition or this instruction, always ask your healthcare professional. [place] [address] any warranty or liability for your use of this information.

## 2017-04-26 ENCOUNTER — HOSPITAL ENCOUNTER (OUTPATIENT)
Dept: ULTRASOUND IMAGING | Age: 57
Discharge: HOME | End: 2017-04-26
Attending: FAMILY MEDICINE
Payer: COMMERCIAL

## 2017-04-26 ENCOUNTER — HOSPITAL ENCOUNTER (OUTPATIENT)
Dept: PHYSICAL THERAPY | Age: 57
Discharge: HOME | End: 2017-04-26
Payer: COMMERCIAL

## 2017-04-26 DIAGNOSIS — R22.42 LEG MASS, LEFT: ICD-10-CM

## 2017-04-26 PROCEDURE — 76882 US LMTD JT/FCL EVL NVASC XTR: CPT

## 2017-04-26 PROCEDURE — 97016 VASOPNEUMATIC DEVICE THERAPY: CPT

## 2017-04-26 PROCEDURE — 97140 MANUAL THERAPY 1/> REGIONS: CPT

## 2017-04-26 PROCEDURE — 97110 THERAPEUTIC EXERCISES: CPT

## 2017-04-26 PROCEDURE — 97035 APP MDLTY 1+ULTRASOUND EA 15: CPT

## 2017-04-26 NOTE — PROGRESS NOTES
PT DAILY TREATMENT NOTE     Patient Name: Giselle Soria  Date:2017  : 1960  [x]  Patient  Verified  Payor: Benson Crow / Plan: [place] [employer] P[place] / Product Type: PPO /    In time: 4:30pm      Out time: 5:28pm  Total Treatment Time (min): 58  Visit #: 6 of     Treatment Area: Chronic pain of left knee [M25.562, G89.29]    SUBJECTIVE  Pain Level (0-10 scale): 0  Any medication changes, allergies to medications, adverse drug reactions, diagnosis change, or new procedure performed?: [x] No    [] Yes (see summary sheet for update)  Subjective functional status/changes:   [] No changes reported  \"I saw Dr. Hermelinda Hollins. He found a lump in my leg and they scheduled me for an ultrasound to check it out. I still can't walk but a couple of blocks until I feel the pain on the outside of the knee (*patient points to lateral proximal tibia*). \"    OBJECTIVE  Modality rationale: improve tissue extensibility, reduce pain, reduce fascial restrictions estrellita of ITB  to improve patient's ability to > tolerance to prolonged ambulation   Min Type Additional Details    [] Estim: []Att   []Unatt        []TENS instruct                  []IFC  []Premod   []NMES                     []Other:  []w/US   []w/ice   []w/heat  Position:  Location:    []  Traction: [] Cervical       []Lumbar                       [] Prone          []Supine                       []Intermittent   []Continuous Lbs:  [] before manual  [] after manual   10 [x]  Ultrasound: [x]Continuous   [] Pulsed                           [x]1MHz   []3MHz Location: (L) ITB   W/cm2: 1.3    []  Iontophoresis with dexamethasone         Location: [] Take home patch   [] In clinic    []  Ice     []  heat  []  Ice massage Position:  Location:   10 [x]  Vasopneumatic Device Pressure:       [] lo [x] med [] hi   Temperature: [x] lo [] med [] hi   [x] Skin assessment post-treatment:  [x]intact []redness- no adverse reaction       []redness  adverse reaction:     28 min Therapeutic Exercise:  [x] See flow sheet: all therex rendered in an upright position to prevent GI aggravation   Rationale: increase ROM, increase strength, improve coordination, improve balance and increase proprioception to improve the patients ability to ambulate for prolonged distances, bend, and 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 Patient is scheduled for an ultrasound regarding a small mass in the hamstring - s/s not consistent with a TrP as unable to release with manual interventions. Rendered all therex in an upright position to prevent recurring GI discomfort. Added SLS in // bars for VMO strengthening. Patient will continue to benefit from skilled PT services to modify and progress therapeutic interventions, address functional mobility deficits, address ROM deficits, address strength deficits, analyze and address soft tissue restrictions, analyze and cue movement patterns, analyze and modify body mechanics/ergonomics, assess and modify postural abnormalities and address imbalance/dizziness to attain remaining goals. [x]  See Plan of Care  []  See progress note/recertification  []  See Discharge Summary         Progress towards goals / Updated goals:  Short Term Goals: To be accomplished in 1 weeks:  1. Pt will be independent and compliant with HEP. -Goal met; patient reporting compliance (4/7/17)  Long Term Goals:  To be accomplished in 8-12 treatments:  1. Pt will increase FOTO score to 68/100 for indications of increased functional mobility. 2. Pt will demo 4/5 knee extension strength for ease with floor to stand transfers at work - goal progressing slowly sec to activity restriction from stomach problems  4/21/17  3. Pt will demo 4/5 knee flexion strength for improved gait mechanics to increase tolerance without pain goal progressing slowly sec to activity restriction from stomach problems  4/21/17  4. Pt will report increased amb tolerance to 4 blocks for progression of activity tolerance.  -Goal progressing; limited to 2 blocks amb sec pain (4/25/17)    PLAN  [x]  Upgrade activities as tolerated     [x]  Continue plan of care  []  Update interventions per flow sheet       []  Discharge due to:_  [x]  Other: inquire about objective ultrasound finding regarding hamstring mass    Krishna Angel PTA 4/26/2017

## 2017-04-27 ENCOUNTER — OFFICE VISIT (OUTPATIENT)
Dept: FAMILY MEDICINE CLINIC | Age: 57
End: 2017-04-27

## 2017-04-27 VITALS
DIASTOLIC BLOOD PRESSURE: 87 MMHG | TEMPERATURE: 97.2 F | OXYGEN SATURATION: 97 % | WEIGHT: 149 LBS | SYSTOLIC BLOOD PRESSURE: 139 MMHG | RESPIRATION RATE: 16 BRPM | HEIGHT: 64 IN | BODY MASS INDEX: 25.44 KG/M2 | HEART RATE: 97 BPM

## 2017-04-27 DIAGNOSIS — E11.65 TYPE 2 DIABETES MELLITUS WITH HYPERGLYCEMIA, WITHOUT LONG-TERM CURRENT USE OF INSULIN (HCC): ICD-10-CM

## 2017-04-27 DIAGNOSIS — D17.24 LIPOMA OF LEFT LOWER EXTREMITY: Primary | ICD-10-CM

## 2017-04-27 DIAGNOSIS — I10 ESSENTIAL HYPERTENSION: ICD-10-CM

## 2017-04-27 NOTE — PROGRESS NOTES
Saba Hemphill is a 62 y.o female that is present for imaging results (Ultrasound results). Patient request medication refills and diabetic supplies. 1. Have you been to the ER, urgent care clinic since your last visit? Hospitalized since your last visit? No    2. Have you seen or consulted any other health care providers outside of the [facility] since your last visit? Include any pap smears or colon screening.  No

## 2017-04-27 NOTE — PROGRESS NOTES
Giselle Soria is a 62 y.o.  female and presents with    Chief Complaint   Patient presents with    Results           Subjective:  Ms. Sonia Trammell presents for results of ultrasound of left upper leg. she continues to have discomfort associated with the mass. she describes the pain as throbbing. extended pressure increases pain. Cardiovascular Review:  The patient has diabetes, hypertension and hyperlipidemia. Diet and Lifestyle: generally follows a low fat low cholesterol diet, generally follows a low sodium diet, follows a diabetic diet regularly, exercises sporadically, nonsmoker  Home BP Monitoring: is not measured at home. Pertinent ROS: taking medications as instructed, no medication side effects noted, no TIA's, no chest pain on exertion, no dyspnea on exertion, no swelling of ankles. Patient Active Problem List   Diagnosis Code    Osteoarthritis of left knee M17.12    Hypertension I10    Hyperlipidemia E78.5    GERD (gastroesophageal reflux disease) K21.9    Asthma J45.909    Type 2 diabetes mellitus ([address] E11.9    SBO (small bowel obstruction) ([address] K56.69    Abdominal pain R10.9    Post-operative pain G89.18     Patient Active Problem List    Diagnosis Date Noted    Abdominal pain 03/14/2017    Post-operative pain 03/14/2017    SBO (small bowel obstruction) ([address] 02/21/2017    Osteoarthritis of left knee 06/27/2016    Hypertension 06/27/2016    Hyperlipidemia 06/27/2016    GERD (gastroesophageal reflux disease) 06/27/2016    Asthma 06/27/2016    Type 2 diabetes mellitus ([address] 06/27/2016     Current Outpatient Prescriptions   Medication Sig Dispense Refill    ondansetron hcl (ZOFRAN, AS HYDROCHLORIDE,) 4 mg tablet Take 1 Tab by mouth every eight (8) hours as needed for Nausea. 12 Tab 0    escitalopram oxalate (LEXAPRO) 10 mg tablet Take 1 Tab by mouth daily. 30 Tab 0    dapagliflozin (FARXIGA) 10 mg tab Take  by mouth daily.  Indications: type 2 diabetes mellitus      amLODIPine (NORVASC) 2.5 mg tablet Take 2.5 mg by mouth nightly.  bisoprolol-hydrochlorothiazide (ZIAC) 2.5-6.25 mg per tablet Take 1 Tab by mouth daily.  atorvastatin (LIPITOR) 20 mg tablet Take 40 mg by mouth daily.  omeprazole (PRILOSEC) 10 mg capsule Take 10 mg by mouth daily.  albuterol (PROVENTIL HFA, VENTOLIN HFA, PROAIR HFA) 90 mcg/actuation inhaler Take 1 Puff by inhalation every six (6) hours as needed for Wheezing.  metFORMIN (GLUMETZA) 500 mg TG24 24 hour tablet Take 500 mg by mouth two (2) times a day.        Allergies   Allergen Reactions    Macrodantin [Nitrofurantoin Macrocrystalline] Itching    Tape [Adhesive] Other (comments)     Burned skin when had wound vac     Past Medical History:   Diagnosis Date    Abdominal adhesions     Anemia     Arthritis     all over the body    Asthma     Asthma     Diabetes ([address]     Diabetes mellitus     Dyskinesia     bilateral    Essential hypertension     GERD (gastroesophageal reflux disease)     Hypertension     Menopause     Stool color black      Past Surgical History:   Procedure Laterality Date    HX CHOLECYSTECTOMY  6/28/10    HX COLONOSCOPY      HX ENDOSCOPY      HX HERNIA REPAIR      HX HYSTERECTOMY      Fibroids    HX KNEE REPLACEMENT      HX OOPHORECTOMY      TISSUE LOCALIZATION-EXCISION       Family History   Problem Relation Age of Onset    Hypertension Other      parent    Breast Cancer Other 21    Heart Disease Father     Hypertension Father     Diabetes Father     Diabetes Mother     Hypertension Other      sibling    Diabetes Other      parent    Diabetes Sister     Kidney Disease Brother     Diabetes Brother      Social History   Substance Use Topics    Smoking status: Never Smoker    Smokeless tobacco: Never Used    Alcohol use No       ROS   General ROS: negative for - chills or fever  Ophthalmic ROS: positive for - uses glasses  ENT ROS: negative for - headaches  Endocrine ROS: negative for - polydipsia/polyuria or temperature intolerance  Respiratory ROS: no cough, shortness of breath, or wheezing  Cardiovascular ROS: no chest pain or dyspnea on exertion  Gastrointestinal ROS: no abdominal pain, change in bowel habits, or black or bloody stools  Genito-Urinary ROS: no dysuria, trouble voiding, or hematuria    All other systems reviewed and are negative. Objective:  Vitals:    04/27/17 1020   BP: 139/87   Pulse: 97   Resp: 16   Temp: 97.2 °F (36.2 °C)   TempSrc: Oral   SpO2: 97%   Weight: 149 lb (67.6 kg)   Height: 5' 4\" (1.626 m)   PainSc:   8   PainLoc: Abdomen       alert, well appearing, and in no distress, oriented to person, place, and time and normal appearing weight  Mental status - normal mood, behavior, speech, dress, motor activity, and thought processes  Musculoskeletal - abnormal exam of left upper leg with TTP posterior   Skin - rashes; no bruising    TESTS  US ext left  IMPRESSION: Benign-appearing mass isoechoic to the normal subcutaneous fat  centered between the proximal hamstring musculature, most in keeping with a  lipoma. Assessment/Plan:    1. Lipoma of left lower extremity  Discussed risk associated with procedure for excision; return for procedure    2. Essential hypertension  Goal <140/90; borderline controlled; continue current medications    3. Diabetes mellitus  Goal hgb a1c <7; well controlled at 6.3; pt taking oral medications; she requests test strips for sporadic testing      Lab review: no lab studies available for review at time of visit      I have discussed the diagnosis with the patient and the intended plan as seen in the above orders. The patient has received an after-visit summary and questions were answered concerning future plans. I have discussed medication side effects and warnings with the patient as well.  I have reviewed the plan of care with the patient, accepted their input and they are in agreement with the treatment goals. Follow-up Disposition:  Return in about 1 week (around 5/4/2017) for procedure.

## 2017-04-27 NOTE — PATIENT INSTRUCTIONS
Lipoma: Care Instructions  Your Care Instructions  A lipoma is a growth of fat just below the skin. It may feel soft and rubbery. Lipomas can occur anywhere on the body. But they are most common on the torso, neck, upper thighs, upper arms, and armpits. A lipoma does not turn into cancer. Lipomas usually are not treated, because most of them don't hurt or cause problems. But your doctor may remove a lipoma if it is painful, gets infected, or bothers you. Follow-up care is a key part of your treatment and safety. Be sure to make and go to all appointments, and call your doctor if you are having problems. It's also a good idea to know your test results and keep a list of the medicines you take. How can you care for yourself at home? · Lipomas usually need no care at home. · If your doctor removes a lipoma, follow directions for taking care of the cut (incision). When should you call for help? Call your doctor now or seek immediate medical care if:  · You have signs of infection, such as:  ¨ Increased pain, swelling, warmth, or redness. ¨ Red streaks leading from the lipoma. ¨ Pus draining from the lipoma. ¨ A fever. Watch closely for changes in your health, and be sure to contact your doctor if:  · You want to discuss having a lipoma removed. Where can you learn more? Go to http://betzy-didi.info/. Enter V273 in the search box to learn more about \"Lipoma: Care Instructions. \"  Current as of: October 13, 2016  Content Version: 11.2  © 2316-7878 [employer]. Care instructions adapted under license by [employer] (which disclaims liability or warranty for this information). If you have questions about a medical condition or this instruction, always ask your healthcare professional. [address] any warranty or liability for your use of this information.

## 2017-04-27 NOTE — MR AVS SNAPSHOT
Visit Information Date & Time Provider Department Dept. Phone Encounter #  
 4/27/2017 10:15 AM Robson Pedroza, [facility] [phone] [id number] Follow-up Instructions Return in about 1 week (around 5/4/2017) for procedure. Upcoming Health Maintenance Date Due Hepatitis C Screening 1960 EYE EXAM RETINAL OR DILATED Q1 2/4/1970 Pneumococcal 19-64 Medium Risk (1 of 1 - PPSV23) 2/4/1979 DTaP/Tdap/Td series (1 - Tdap) 2/4/1981 PAP AKA CERVICAL CYTOLOGY 2/4/1981 HEMOGLOBIN A1C Q6M 9/27/2017 FOOT EXAM Q1 3/27/2018 MICROALBUMIN Q1 3/27/2018 LIPID PANEL Q1 3/27/2018 BREAST CANCER SCRN MAMMOGRAM 1/6/2019 COLONOSCOPY 3/3/2026 Allergies as of 4/27/2017  Review Complete On: 4/24/2017 By: Robson Pedroza MD  
  
 Severity Noted Reaction Type Reactions Macrodantin [Nitrofurantoin Macrocrystalline]  01/31/2012    Itching Tape [Adhesive]  09/30/2014    Other (comments) Burned skin when had wound vac Current Immunizations  Reviewed on 2/22/2017 Name Date Influenza Vaccine 11/15/2016 Not reviewed this visit You Were Diagnosed With   
  
 Codes Comments Lipoma of left lower extremity    -  Primary ICD-10-CM: D17.24 ICD-9-CM: 214.1 Essential hypertension     ICD-10-CM: I10 
ICD-9-CM: 401.9 Vitals BP Pulse Temp Resp Height(growth percentile) Weight(growth percentile) 139/87 (BP 1 Location: Right arm, BP Patient Position: Sitting) 97 97.2 °F (36.2 °C) (Oral) 16 5' 4\" (1.626 m) 149 lb (67.6 kg) SpO2 BMI OB Status Smoking Status 97% 25.58 kg/m2 Hysterectomy Never Smoker BMI and BSA Data Body Mass Index Body Surface Area 25.58 kg/m 2 1.75 m 2 Preferred Pharmacy Pharmacy Name Phone [facility]  [facility] [phone] Your Updated Medication List  
  
   
 This list is accurate as of: 4/27/17 10:51 AM.  Always use your most recent med list.  
  
  
  
  
 albuterol 90 mcg/actuation inhaler Commonly known as:  PROVENTIL HFA, VENTOLIN HFA, PROAIR HFA Take 1 Puff by inhalation every six (6) hours as needed for Wheezing. amLODIPine 2.5 mg tablet Commonly known as:  [place] Take 2.5 mg by mouth nightly. escitalopram oxalate 10 mg tablet Commonly known as:  Maudie Phoenix Take 1 Tab by mouth daily. FARXIGA 10 mg Tab Generic drug:  dapagliflozin Take  by mouth daily. Indications: type 2 diabetes mellitus GLUMETZA 500 mg Tg24 24 hour tablet Generic drug:  metFORMIN Take 500 mg by mouth two (2) times a day. LIPITOR 20 mg tablet Generic drug:  atorvastatin Take 40 mg by mouth daily. omeprazole 10 mg capsule Commonly known as:  PRILOSEC Take 10 mg by mouth daily. ondansetron hcl 4 mg tablet Commonly known as:  ZOFRAN (AS HYDROCHLORIDE) Take 1 Tab by mouth every eight (8) hours as needed for Nausea. ZIAC 2.5-6.25 mg per tablet Generic drug:  bisoprolol-hydroCHLOROthiazide Take 1 Tab by mouth daily. Follow-up Instructions Return in about 1 week (around 5/4/2017) for procedure. To-Do List   
 04/28/2017 11:00 AM  
  Appointment with Cheryl Cleaning PT at [address] [place] [address] ([phone]) 05/01/2017 4:00 PM  
  Appointment with Rito Gomes PTA at [facility] [address]  ([phone]) Patient Instructions Lipoma: Care Instructions Your Care Instructions A lipoma is a growth of fat just below the skin. It may feel soft and rubbery. Lipomas can occur anywhere on the body. But they are most common on the torso, neck, upper thighs, upper arms, and armpits. A lipoma does not turn into cancer. Lipomas usually are not treated, because most of them don't hurt or cause problems. But your doctor may remove a lipoma if it is painful, gets infected, or bothers you. Follow-up care is a key part of your treatment and safety. Be sure to make and go to all appointments, and call your doctor if you are having problems. It's also a good idea to know your test results and keep a list of the medicines you take. How can you care for yourself at home? · Lipomas usually need no care at home. · If your doctor removes a lipoma, follow directions for taking care of the cut (incision). When should you call for help? Call your doctor now or seek immediate medical care if: 
· You have signs of infection, such as: 
¨ Increased pain, swelling, warmth, or redness. ¨ Red streaks leading from the lipoma. ¨ Pus draining from the lipoma. ¨ A fever. Watch closely for changes in your health, and be sure to contact your doctor if: 
· You want to discuss having a lipoma removed. Where can you learn more? Go to http://betzy-didi.info/. Enter R320 in the search box to learn more about \"Lipoma: Care Instructions. \" Current as of: October 13, 2016 Content Version: 11.2 © 8118-4830 [employer]. Care instructions adapted under license by [employer] (which disclaims liability or warranty for this information). If you have questions about a medical condition or this instruction, always ask your healthcare professional. [address] any warranty or liability for your use of this information. Introducing \[facility] [place] [facility]\"" & HEALTH SERVICES! King Yudith introduces [employer] patient portal. Now you can access parts of your medical record, email your doctor's office, and request medication refills online. 1. In your internet browser, go to [email or web address] 2. Click on the First Time User? Click Here link in the Sign In box. You will see the New Member Sign Up page. 3. Enter your [employer] Access Code exactly as it appears below. You will not need to use this code after youve completed the sign-up process.  If you do not sign up before the expiration date, you must request a new code. · [employer] Access Code: [email or web address] Expires: 7/23/2017 10:48 AM 
 
4. Enter the last four digits of your Social Security Number (xxxx) and Date of Birth (mm/dd/yyyy) as indicated and click Submit. You will be taken to the next sign-up page. 5. Create a H[employer]us ID. This will be your [employer] login ID and cannot be changed, so think of one that is secure and easy to remember. 6. Create a [employer] password. You can change your password at any time. 7. Enter your Password Reset Question and Answer. This can be used at a later time if you forget your password. 8. Enter your e-mail address. You will receive e-mail notification when new information is available in [address] 9. Click Sign Up. You can now view and download portions of your medical record. 10. Click the Download Summary menu link to download a portable copy of your medical information. If you have questions, please visit the Frequently Asked Questions section of the [employer] website. Remember, [employer]us is NOT to be used for urgent needs. For medical emergencies, dial 911. Now available from your iPhone and Android! Please provide this summary of care documentation to your next provider. Your primary care clinician is listed as Conrado Dose. If you have any questions after today's visit, please call [phone].

## 2017-04-28 ENCOUNTER — HOSPITAL ENCOUNTER (OUTPATIENT)
Dept: PHYSICAL THERAPY | Age: 57
Discharge: HOME | End: 2017-04-28
Payer: COMMERCIAL

## 2017-04-28 PROCEDURE — 97035 APP MDLTY 1+ULTRASOUND EA 15: CPT

## 2017-04-28 PROCEDURE — 97140 MANUAL THERAPY 1/> REGIONS: CPT

## 2017-04-28 PROCEDURE — 97110 THERAPEUTIC EXERCISES: CPT

## 2017-04-28 NOTE — PROGRESS NOTES
PT DAILY TREATMENT NOTE     Patient Name: Christiano Sis  Date:2017  : 1960  [x]  Patient  Verified  Payor: Constantine Fox / Plan: [place] [employer] / Product Type: PPO /    In time: 1100  Out time: 09  Total Treatment Time (min): 43  Visit #: 7 of     Treatment Area: Chronic pain of left knee [M25.562, G89.29]    SUBJECTIVE  Pain Level (0-10 scale): 0  Any medication changes, allergies to medications, adverse drug reactions, diagnosis change, or new procedure performed?: [x] No    [] Yes (see summary sheet for update)  Subjective functional status/changes:   [] No changes reported  \"I walked 2 laps around the park for about 1 hour and ride the bike. \"    OBJECTIVE  Modality rationale: improve tissue extensibility, reduce pain, reduce fascial restrictions estrellita of ITB  to improve patient's ability to > tolerance to prolonged ambulation   Min Type Additional Details    [] Estim: []Att   []Unatt        []TENS instruct                  []IFC  []Premod   []NMES                     []Other:  []w/US   []w/ice   []w/heat  Position:  Location:    []  Traction: [] Cervical       []Lumbar                       [] Prone          []Supine                       []Intermittent   []Continuous Lbs:  [] before manual  [] after manual   8 [x]  Ultrasound: [x]Continuous   [] Pulsed                           [x]1MHz   []3MHz Location: (L) ITB   W/cm2: 1.3    []  Iontophoresis with dexamethasone         Location: [] Take home patch   [] In clinic    []  Ice     []  heat  []  Ice massage Position:  Location:   NT [x]  Vasopneumatic Device Pressure:       [] lo [x] med [] hi   Temperature: [x] lo [] med [] hi   [x] Skin assessment post-treatment:  [x]intact []redness- no adverse reaction       []redness  adverse reaction:     24 min Therapeutic Exercise:  [x] See flow sheet:     Rationale: increase ROM, increase strength, improve coordination, improve balance and increase proprioception to improve the patients ability to ambulate for prolonged distances, bend, and 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 Patient feels comfortable with DC at this time. SLS without UE x10 sec with LOB . AROM functional and improved 9 deg from IE without pain. Patient will continue to benefit from skilled PT services to modify and progress therapeutic interventions, address functional mobility deficits, address ROM deficits, address strength deficits, analyze and address soft tissue restrictions, analyze and cue movement patterns, analyze and modify body mechanics/ergonomics, assess and modify postural abnormalities and address imbalance/dizziness to attain remaining goals. [x]  See Plan of Care  []  See progress note/recertification  []  See Discharge Summary         Progress towards goals / Updated goals:  Short Term Goals: To be accomplished in 1 weeks:  1. Pt will be independent and compliant with HEP. -Goal met; patient reporting compliance (4/7/17)  Long Term Goals: To be accomplished in 8-12 treatments:  1. Pt will increase FOTO score to 68/100 for indications of increased functional mobility. 2. Pt will demo 4/5 knee extension strength for ease with floor to stand transfers at work - goal progressing slowly sec to activity restriction from stomach problems  4/21/17  3.  Pt will demo 4/5 knee flexion strength for improved gait mechanics to increase tolerance without pain goal progressing slowly sec to activity restriction from stomach problems  4/21/17  4. Pt will report increased amb tolerance to 4 blocks for progression of activity tolerance.  -Goal progressing; limited to 2 blocks amb sec pain (4/26/17)    PLAN  [x]  Upgrade activities as tolerated     [x]  Continue plan of care  []  Update interventions per flow sheet       []  Discharge due to:_  [x]  Other: last apt scheduled on Monday - DC at that time to [facility], PT 4/28/2017

## 2017-05-01 ENCOUNTER — HOSPITAL ENCOUNTER (OUTPATIENT)
Dept: PHYSICAL THERAPY | Age: 57
Discharge: HOME | End: 2017-05-01
Payer: COMMERCIAL

## 2017-05-01 PROCEDURE — 97140 MANUAL THERAPY 1/> REGIONS: CPT

## 2017-05-01 PROCEDURE — 97110 THERAPEUTIC EXERCISES: CPT

## 2017-05-01 PROCEDURE — 97035 APP MDLTY 1+ULTRASOUND EA 15: CPT

## 2017-05-01 NOTE — PROGRESS NOTES
PT DAILY TREATMENT NOTE     Patient Name: Anthony Nevarez  Date:2017  : 1960  [x]  Patient  Verified  Payor: Thea Gamez / [place]: [place] [employer] [place] / Product Type: PPO /    In time: 3:57pm       Out time: 4:47pm  Total Treatment Time (min): 50  Visit #: 8 of     Treatment Area: Chronic pain of left knee [M25.562, G89.29]    SUBJECTIVE  Pain Level (0-10 scale): 0  Any medication changes, allergies to medications, adverse drug reactions, diagnosis change, or new procedure performed?: [x] No    [] Yes (see summary sheet for update)  Subjective functional status/changes:   [] No changes reported  \"I feel great! My  has been working on my leg like you do. I don't have trouble lifting the kids anymore. \"    OBJECTIVE  Modality rationale: improve tissue extensibility, reduce pain, reduce fascial restrictions estrellita of ITB  to improve patient's ability to > tolerance to prolonged ambulation   Min Type Additional Details    [] Estim: []Att   []Unatt        []TENS instruct                  []IFC  []Premod   []NMES                     []Other:  []w/US   []w/ice   []w/heat  Position:  Location:    []  Traction: [] Cervical       []Lumbar                       [] Prone          []Supine                       []Intermittent   []Continuous Lbs:  [] before manual  [] after manual   8 [x]  Ultrasound: [x]Continuous   [] Pulsed                           [x]1MHz   []3MHz Location: (L) ITB   W/cm2: 1.3    []  Iontophoresis with dexamethasone         Location: [] Take home patch   [] In clinic    []  Ice     []  heat  []  Ice massage Position:  Location:   NT [x]  Vasopneumatic Device Pressure:       [] lo [x] med [] hi   Temperature: [x] lo [] med [] hi   [x] Skin assessment post-treatment:  [x]intact []redness- no adverse reaction       []redness  adverse reaction:     32 min Therapeutic Exercise:  [x] See flow sheet: no changes sec D/C day; assisted patient with FOTO completion   Rationale: increase ROM, increase strength, improve coordination, improve balance and increase proprioception to improve the patients ability to ambulate for prolonged distances, bend, and 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     10 min Manual Therapy:  Reassessment; ITB roller in SL, supine PROM knee flexion, supine ITB stretch   Rationale: decrease pain, increase ROM, increase tissue extensibility and decrease trigger points to improve patient's mobility for floor<>stand transfer as necessitated by work duties          X min Patient Education: [x] Review HEP in preparation for D/C      Other Objective/Functional Measures:    Subjective improvement: 90-95%   FOTO score: 81/100 from 62/100 at IE   (L) knee AROM: 0-110deg   (L) knee strength: ext 5/5, flex 4+/5   Improvements: walking tolerance (2 laps around park), return to biking, reduced ave pain levels, ADLs, 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 Pain Level (0-10 scale) post treatment: 0    ASSESSMENT/Changes in Function:   See D/C. Patient will continue to benefit from skilled PT services to modify and progress therapeutic interventions, address functional mobility deficits, address ROM deficits, address strength deficits, analyze and address soft tissue restrictions, analyze and cue movement patterns, analyze and modify body mechanics/ergonomics, assess and modify postural abnormalities and address imbalance/dizziness to attain remaining goals. [x]  See Discharge Summary         Progress towards goals / Updated goals:  Short Term Goals: To be accomplished in 1 weeks:  11. Pt will be independent and compliant with HEP. -Goal met; patient reporting compliance (4/7/17)  Long Term Goals: To be accomplished in 8-12 treatments:  . Pt will increase FOTO score to 68/100 for indications of increased functional mobility.  -Goal met; 81/100  2.  Pt will demo 4/5 knee extension strength for ease with floor to stand transfers at work  -Goal met; 5/5 knee extension strength  3. Pt will demo 4/5 knee flexion strength for improved gait mechanics to increase tolerance without pain -Goal met; 4+/5 knee flexion strength  4. Pt will report increased amb tolerance to 4 blocks for progression of activity tolerance.  -Goal met; patient reports ability to amb 2 laps around park    PLAN  [x]  Discharge due to: patient meeting all goals    Mary Ellen Salazar, ELSA 5/1/2017

## 2017-05-02 ENCOUNTER — HOSPITAL ENCOUNTER (EMERGENCY)
Age: 57
Discharge: HOME | End: 2017-05-03
Attending: EMERGENCY MEDICINE
Payer: COMMERCIAL

## 2017-05-02 ENCOUNTER — APPOINTMENT (OUTPATIENT)
Dept: CT IMAGING | Age: 57
End: 2017-05-02
Attending: EMERGENCY MEDICINE
Payer: COMMERCIAL

## 2017-05-02 DIAGNOSIS — R10.32 ACUTE BILATERAL LOWER ABDOMINAL PAIN: Primary | ICD-10-CM

## 2017-05-02 DIAGNOSIS — R10.31 ACUTE BILATERAL LOWER ABDOMINAL PAIN: Primary | ICD-10-CM

## 2017-05-02 DIAGNOSIS — M25.562 ACUTE BILATERAL KNEE PAIN: ICD-10-CM

## 2017-05-02 DIAGNOSIS — M25.561 ACUTE BILATERAL KNEE PAIN: ICD-10-CM

## 2017-05-02 LAB
ALBUMIN SERPL BCP-MCNC: 4 G/DL (ref 3.4–5)
ALBUMIN/GLOB SERPL: 0.9 {RATIO} (ref 0.8–1.7)
ALP SERPL-CCNC: 150 U/L (ref 45–117)
ALT SERPL-CCNC: 28 U/L (ref 13–56)
ANION GAP BLD CALC-SCNC: 8 MMOL/L (ref 3–18)
APPEARANCE UR: CLEAR
AST SERPL W P-5'-P-CCNC: 19 U/L (ref 15–37)
BACTERIA URNS QL MICRO: NEGATIVE /HPF
BASOPHILS # BLD AUTO: 0 K/UL (ref 0–0.06)
BASOPHILS # BLD: 0 % (ref 0–2)
BILIRUB SERPL-MCNC: 0.4 MG/DL (ref 0.2–1)
BILIRUB UR QL: NEGATIVE
BUN SERPL-MCNC: 26 MG/DL (ref 7–18)
BUN/CREAT SERPL: 20 (ref 12–20)
CALCIUM SERPL-MCNC: 9.9 MG/DL (ref 8.5–10.1)
CHLORIDE SERPL-SCNC: 102 MMOL/L (ref 100–108)
CO2 SERPL-SCNC: 28 MMOL/L (ref 21–32)
COLOR UR: YELLOW
CREAT SERPL-MCNC: 1.27 MG/DL (ref 0.6–1.3)
DIFFERENTIAL METHOD BLD: ABNORMAL
EOSINOPHIL # BLD: 0.1 K/UL (ref 0–0.4)
EOSINOPHIL NFR BLD: 1 % (ref 0–5)
EPITH CASTS URNS QL MICRO: NORMAL /LPF (ref 0–5)
ERYTHROCYTE [DISTWIDTH] IN BLOOD BY AUTOMATED COUNT: 13.6 % (ref 11.6–14.5)
GLOBULIN SER CALC-MCNC: 4.3 G/DL (ref 2–4)
GLUCOSE SERPL-MCNC: 110 MG/DL (ref 74–99)
GLUCOSE UR STRIP.AUTO-MCNC: >1000 MG/DL
HCT VFR BLD AUTO: 42.5 % (ref 35–45)
HGB BLD-MCNC: 14.3 G/DL (ref 12–16)
HGB UR QL STRIP: ABNORMAL
KETONES UR QL STRIP.AUTO: NEGATIVE MG/DL
LEUKOCYTE ESTERASE UR QL STRIP.AUTO: NEGATIVE
LYMPHOCYTES # BLD AUTO: 20 % (ref 21–52)
LYMPHOCYTES # BLD: 1.8 K/UL (ref 0.9–3.6)
MCH RBC QN AUTO: 28.4 PG (ref 24–34)
MCHC RBC AUTO-ENTMCNC: 33.6 G/DL (ref 31–37)
MCV RBC AUTO: 84.5 FL (ref 74–97)
MONOCYTES # BLD: 0.8 K/UL (ref 0.05–1.2)
MONOCYTES NFR BLD AUTO: 8 % (ref 3–10)
NEUTS SEG # BLD: 6.6 K/UL (ref 1.8–8)
NEUTS SEG NFR BLD AUTO: 71 % (ref 40–73)
NITRITE UR QL STRIP.AUTO: NEGATIVE
PH UR STRIP: 5.5 [PH] (ref 5–8)
PLATELET # BLD AUTO: 259 K/UL (ref 135–420)
PMV BLD AUTO: 10.5 FL (ref 9.2–11.8)
POTASSIUM SERPL-SCNC: 4.7 MMOL/L (ref 3.5–5.5)
PROT SERPL-MCNC: 8.3 G/DL (ref 6.4–8.2)
PROT UR STRIP-MCNC: ABNORMAL MG/DL
RBC # BLD AUTO: 5.03 M/UL (ref 4.2–5.3)
RBC #/AREA URNS HPF: NORMAL /HPF (ref 0–5)
SODIUM SERPL-SCNC: 138 MMOL/L (ref 136–145)
SP GR UR REFRACTOMETRY: 1.02 (ref 1–1.03)
UROBILINOGEN UR QL STRIP.AUTO: 0.2 EU/DL (ref 0.2–1)
WBC # BLD AUTO: 9.2 K/UL (ref 4.6–13.2)
WBC URNS QL MICRO: NORMAL /HPF (ref 0–4)

## 2017-05-02 PROCEDURE — 96361 HYDRATE IV INFUSION ADD-ON: CPT

## 2017-05-02 PROCEDURE — 80053 COMPREHEN METABOLIC PANEL: CPT | Performed by: EMERGENCY MEDICINE

## 2017-05-02 PROCEDURE — 99283 EMERGENCY DEPT VISIT LOW MDM: CPT

## 2017-05-02 PROCEDURE — 74011636320 HC RX REV CODE- 636/320: Performed by: EMERGENCY MEDICINE

## 2017-05-02 PROCEDURE — 81001 URINALYSIS AUTO W/SCOPE: CPT | Performed by: EMERGENCY MEDICINE

## 2017-05-02 PROCEDURE — 85025 COMPLETE CBC W/AUTO DIFF WBC: CPT | Performed by: EMERGENCY MEDICINE

## 2017-05-02 PROCEDURE — 74011250636 HC RX REV CODE- 250/636: Performed by: EMERGENCY MEDICINE

## 2017-05-02 PROCEDURE — 96374 THER/PROPH/DIAG INJ IV PUSH: CPT

## 2017-05-02 PROCEDURE — 96376 TX/PRO/DX INJ SAME DRUG ADON: CPT

## 2017-05-02 PROCEDURE — 96375 TX/PRO/DX INJ NEW DRUG ADDON: CPT

## 2017-05-02 PROCEDURE — 74177 CT ABD & PELVIS W/CONTRAST: CPT

## 2017-05-02 RX ORDER — MORPHINE SULFATE 4 MG/ML
6 INJECTION, SOLUTION INTRAMUSCULAR; INTRAVENOUS
Status: COMPLETED | OUTPATIENT
Start: 2017-05-02 | End: 2017-05-02

## 2017-05-02 RX ORDER — MORPHINE SULFATE 4 MG/ML
4 INJECTION, SOLUTION INTRAMUSCULAR; INTRAVENOUS
Status: COMPLETED | OUTPATIENT
Start: 2017-05-02 | End: 2017-05-02

## 2017-05-02 RX ORDER — ONDANSETRON 4 MG/1
4 TABLET, ORALLY DISINTEGRATING ORAL
Qty: 8 TAB | Refills: 0 | Status: SHIPPED | OUTPATIENT
Start: 2017-05-02 | End: 2017-06-14

## 2017-05-02 RX ORDER — HYDROCODONE BITARTRATE AND ACETAMINOPHEN 5; 325 MG/1; MG/1
1 TABLET ORAL
Qty: 6 TAB | Refills: 0 | Status: SHIPPED | OUTPATIENT
Start: 2017-05-02 | End: 2017-05-15

## 2017-05-02 RX ORDER — HYDROMORPHONE HYDROCHLORIDE 1 MG/ML
1 INJECTION, SOLUTION INTRAMUSCULAR; INTRAVENOUS; SUBCUTANEOUS
Status: COMPLETED | OUTPATIENT
Start: 2017-05-02 | End: 2017-05-02

## 2017-05-02 RX ORDER — ONDANSETRON 2 MG/ML
4 INJECTION INTRAMUSCULAR; INTRAVENOUS
Status: COMPLETED | OUTPATIENT
Start: 2017-05-02 | End: 2017-05-02

## 2017-05-02 RX ADMIN — Medication 4 MG: at 22:16

## 2017-05-02 RX ADMIN — HYDROMORPHONE HYDROCHLORIDE 1 MG: 1 INJECTION, SOLUTION INTRAMUSCULAR; INTRAVENOUS; SUBCUTANEOUS at 22:57

## 2017-05-02 RX ADMIN — Medication 6 MG: at 21:01

## 2017-05-02 RX ADMIN — IOPAMIDOL 100 ML: 612 INJECTION, SOLUTION INTRAVENOUS at 22:42

## 2017-05-02 RX ADMIN — SODIUM CHLORIDE 1000 ML: 900 INJECTION, SOLUTION INTRAVENOUS at 20:27

## 2017-05-02 RX ADMIN — ONDANSETRON 4 MG: 2 INJECTION INTRAMUSCULAR; INTRAVENOUS at 21:01

## 2017-05-02 NOTE — ED PROVIDER NOTES
HPI Comments: Kostas Corral is a 62 y.o. Female who is s/p colon surgery in feb started back to work today and noted onset of severe lower abd pain as she was bending over to  child and this recurred again went the action was repeated. Pain radiated down to legs with bilateral knee pain. Mild sob with pain. No cp, nvd, urinary sx. Has been moving bowels well in last 2-3 days. No swelling of incision diff taking po down. Pain is sharp, stabbing, worse with movement    The history is provided by the patient. Past Medical History:   Diagnosis Date    Abdominal adhesions     Anemia     Arthritis     all over the body    Asthma     Asthma     Diabetes ([address]     Diabetes mellitus     Dyskinesia     bilateral    Essential hypertension     GERD (gastroesophageal reflux disease)     Hypertension     Menopause     Stool color black        Past Surgical History:   Procedure Laterality Date    HX CHOLECYSTECTOMY  6/28/10    HX COLONOSCOPY      HX ENDOSCOPY      HX HERNIA REPAIR      HX HYSTERECTOMY      Fibroids    HX KNEE REPLACEMENT      HX OOPHORECTOMY      TISSUE LOCALIZATION-EXCISION           Family History:   Problem Relation Age of Onset    Hypertension Other      parent    Breast Cancer Other 21    Heart Disease Father     Hypertension Father     Diabetes Father     Diabetes Mother     Hypertension Other      sibling    Diabetes Other      parent    Diabetes Sister     Kidney Disease Brother     Diabetes Brother        Social History     Social History    Marital status:      Spouse name: N/A    Number of children: N/A    Years of education: N/A     Occupational History    Not on file.      Social History Main Topics    Smoking status: Never Smoker    Smokeless tobacco: Never Used    Alcohol use No    Drug use: No    Sexual activity: Yes     Partners: Male     Birth control/ protection: None     Other Topics Concern    Not on file     Social History Narrative         ALLERGIES: Macrodantin [nitrofurantoin macrocrystalline] and Tape [adhesive]    Review of Systems   Constitutional: Negative for fever. HENT: Negative for sore throat and trouble swallowing. Eyes: Negative for visual disturbance. Respiratory: Negative for cough. Cardiovascular: Negative for chest pain. Endocrine: Negative for polyuria. Genitourinary: Negative for difficulty urinating. Skin: Negative for rash. Neurological: Negative for syncope. Hematological: Does not bruise/bleed easily. Psychiatric/Behavioral: Positive for sleep disturbance. Vitals:    05/02/17 2115 05/02/17 2253 05/02/17 2300 05/02/17 2315   BP: 134/83 140/84 132/79 140/75   Pulse:       Resp:       Temp:       SpO2:       Weight:       Height:                Physical Exam   Constitutional: She is oriented to person, place, and time. She appears well-developed and well-nourished. She appears distressed (appears in sig pain that seems to come and go). HENT:   Head: Normocephalic and atraumatic. Right Ear: External ear normal.   Left Ear: External ear normal.   Nose: Nose normal.   Mouth/Throat: Uvula is midline, oropharynx is clear and moist and mucous membranes are normal.   Eyes: Conjunctivae are normal. No scleral icterus. Neck: Neck supple. Cardiovascular: Normal rate, regular rhythm, normal heart sounds and intact distal pulses. Pulmonary/Chest: Effort normal and breath sounds normal.   Abdominal: Soft. She exhibits no distension and no mass. There is tenderness. There is guarding. There is no rebound. Musculoskeletal: She exhibits no edema. Neurological: She is alert and oriented to person, place, and time. Gait normal.   Skin: Skin is warm and dry. She is not diaphoretic. Psychiatric: Her behavior is normal.   Nursing note and vitals reviewed.        [facility]  ED Course       Procedures    Vitals:  Patient Vitals for the past 12 hrs:   Temp Pulse Resp BP SpO2   05/02/17 2315 - - - 140/75 -   05/02/17 2300 - - - 132/79 -   05/02/17 2253 - - - 140/84 -   05/02/17 2115 - - - 134/83 -   05/02/17 2045 - - - 133/88 -   05/02/17 2039 - - - 127/90 -   05/02/17 1954 - - - (!) 149/98 -   05/02/17 1922 98 °F (36.7 °C) 91 20 - 100 %         Medications ordered:   Medications   sodium chloride 0.9 % bolus infusion 1,000 mL (0 mL IntraVENous IV Completed 5/2/17 2257)   morphine injection 6 mg (6 mg IntraVENous Given 5/2/17 2101)   ondansetron (ZOFRAN) injection 4 mg (4 mg IntraVENous Given 5/2/17 2101)   iopamidol (ISOVUE 300) 61 % contrast injection 100-200 mL (100 mL IntraVENous Given 5/2/17 2242)   morphine injection 4 mg (4 mg IntraVENous Given 5/2/17 2216)   HYDROmorphone (PF) (DILAUDID) injection 1 mg (1 mg IntraVENous Given 5/2/17 2257)         Lab findings:  Recent Results (from the past 12 hour(s))   CBC WITH AUTOMATED DIFF    Collection Time: 05/02/17  8:47 PM   Result Value Ref Range    WBC 9.2 4.6 - 13.2 K/uL    RBC 5.03 4.20 - 5.30 M/uL    HGB 14.3 12.0 - 16.0 g/dL    HCT 42.5 35.0 - 45.0 %    MCV 84.5 74.0 - 97.0 FL    MCH 28.4 24.0 - 34.0 PG    MCHC 33.6 31.0 - 37.0 g/dL    RDW 13.6 11.6 - 14.5 %    PLATELET 006 253 - 845 K/uL    MPV 10.5 9.2 - 11.8 FL    NEUTROPHILS 71 40 - 73 %    LYMPHOCYTES 20 (L) 21 - 52 %    MONOCYTES 8 3 - 10 %    EOSINOPHILS 1 0 - 5 %    BASOPHILS 0 0 - 2 %    ABS. NEUTROPHILS 6.6 1.8 - 8.0 K/UL    ABS. LYMPHOCYTES 1.8 0.9 - 3.6 K/UL    ABS. MONOCYTES 0.8 0.05 - 1.2 K/UL    ABS. EOSINOPHILS 0.1 0.0 - 0.4 K/UL    ABS.  BASOPHILS 0.0 0.0 - 0.06 K/UL    DF AUTOMATED     METABOLIC PANEL, COMPREHENSIVE    Collection Time: 05/02/17  8:47 PM   Result Value Ref Range    Sodium 138 136 - 145 mmol/L    Potassium 4.7 3.5 - 5.5 mmol/L    Chloride 102 100 - 108 mmol/L    CO2 28 21 - 32 mmol/L    Anion gap 8 3.0 - 18 mmol/L    Glucose 110 (H) 74 - 99 mg/dL    BUN 26 (H) 7.0 - 18 MG/DL    Creatinine 1.27 0.6 - 1.3 MG/DL    BUN/Creatinine ratio 20 12 - 20      GFR est AA 53 (L) >60 ml/min/1.73m2    GFR est non-AA 43 (L) >60 ml/min/1.73m2    Calcium 9.9 8.5 - 10.1 MG/DL    Bilirubin, total 0.4 0.2 - 1.0 MG/DL    ALT (SGPT) 28 13 - 56 U/L    AST (SGOT) 19 15 - 37 U/L    Alk. phosphatase 150 (H) 45 - 117 U/L    Protein, total 8.3 (H) 6.4 - 8.2 g/dL    Albumin 4.0 3.4 - 5.0 g/dL    Globulin 4.3 (H) 2.0 - 4.0 g/dL    A-G Ratio 0.9 0.8 - 1.7     URINALYSIS W/ RFLX MICROSCOPIC    Collection Time: 05/02/17  9:36 PM   Result Value Ref Range    Color YELLOW      Appearance CLEAR      Specific gravity 1.023 1.005 - 1.030      pH (UA) 5.5 5.0 - 8.0      Protein TRACE (A) NEG mg/dL    Glucose >1000 (A) NEG mg/dL    Ketone NEGATIVE  NEG mg/dL    Bilirubin NEGATIVE  NEG      Blood LARGE (A) NEG      Urobilinogen 0.2 0.2 - 1.0 EU/dL    Nitrites NEGATIVE  NEG      Leukocyte Esterase NEGATIVE  NEG     URINE MICROSCOPIC ONLY    Collection Time: 05/02/17  9:36 PM   Result Value Ref Range    WBC 0 to 3 0 - 4 /hpf    RBC 8 to 10 0 - 5 /hpf    Epithelial cells FEW 0 - 5 /lpf    Bacteria NEGATIVE  NEG /hpf       EKG interpretation by ED Physician:      X-Ray, CT or other radiology findings or impressions:  CT ABD PELV W CONT    (Results Pending)   no acute findings on prelim report    Progress notes, Consult notes or additional Procedure notes:   Feeling better. No obvious infection sig abnl to req admission, further treatment. D/w pt results rec f/u, treatment plan    Reevaluation of patient:   Stable for d/c     Disposition:  Diagnosis:   1. Acute bilateral lower abdominal pain    2.  Acute bilateral knee pain        Disposition: home      Follow-up Information     Follow up With Details Comments [facility], [place] Schedule an appointment as soon as possible for a visit  [address] [place] [address]  [phone]              Patient's Medications   Start Taking    HYDROCODONE-ACETAMINOPHEN (NORCO) 5-325 MG PER TABLET    Take 1 Tab by mouth every six (6) hours as needed for Pain. Max Daily Amount: 4 Tabs. ONDANSETRON (ZOFRAN ODT) 4 MG DISINTEGRATING TABLET    Take 1 Tab by mouth every eight (8) hours as needed for Nausea. Continue Taking    ALBUTEROL (PROVENTIL HFA, VENTOLIN HFA, PROAIR HFA) 90 MCG/ACTUATION INHALER    Take 1 Puff by inhalation every six (6) hours as needed for Wheezing. AMLODIPINE (NORVASC) 2.5 MG TABLET    Take 2.5 mg by mouth nightly. ATORVASTATIN (LIPITOR) 20 MG TABLET    Take 40 mg by mouth daily. BISOPROLOL-HYDROCHLOROTHIAZIDE (ZIAC) 2.5-6.25 MG PER TABLET    Take 1 Tab by mouth daily. DAPAGLIFLOZIN (FARXIGA) 10 MG TAB    Take  by mouth daily. Indications: type 2 diabetes mellitus    ESCITALOPRAM OXALATE (LEXAPRO) 10 MG TABLET    Take 1 Tab by mouth daily. GLUCOSE BLOOD VI TEST STRIPS (ONETOUCH ULTRA TEST) STRIP    Check blood glucose as directed    METFORMIN (GLUMETZA) 500 MG TG24 24 HOUR TABLET    Take 500 mg by mouth two (2) times a day. OMEPRAZOLE (PRILOSEC) 10 MG CAPSULE    Take 10 mg by mouth daily. ONDANSETRON HCL (ZOFRAN, AS HYDROCHLORIDE,) 4 MG TABLET    Take 1 Tab by mouth every eight (8) hours as needed for Nausea.    These Medications have changed    No medications on file   Stop Taking    No medications on file

## 2017-05-02 NOTE — PROGRESS NOTES
[address] [facility] [address] [facility] AT [address]. [facility] [address]     Phone: [phone] Fax: [phone] SUMMARY  Patient Name: Aniya Castillo : 1960   Treatment/Medical Diagnosis: Chronic pain of left knee [A88.736, G89.29]   Referral Source: Dina Lacey MD     Date of Initial Visit: 4/3/17 Attended Visits: 8 Missed Visits: 3     SUMMARY OF TREATMENT  Pt is a 62y.o. year old female who presents with co L anterolateral knee pain. Patient has hx of L TKR 2016, but exacerbation of pain since x 2 falls in the snow in . Patient had xrays showing routine healing of TKR. Treatment has consisted of the following: Therapeutic exercise, Therapeutic activities, Physical agent/modality, Gait/balance training, Manual therapy,  and Patient education. CURRENT STATUS  Patient has made excellent progress in PT as indicated by improved AROM 0-110 degrees, increased walking tolerance 2 laps around park, and reduced ave pain levels. 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 Assessment as follows:  Subjective improvement: 90-95%  FOTO score: 81/100 from 62/100 at IE  (L) knee AROM: 0-110deg  (L) knee strength: ext 5/5, flex 4+/5  Pain: (A) 0    Goal/Measure of Progress:  Pt will increase FOTO score to 68/100 for indications of increased functional mobility.  -Goal met; 81/100  2. Pt will demo 4/5 knee extension strength for ease with floor to stand transfers at work -Goal met; 5/5 knee extension strength  3. Pt will demo 4/5 knee flexion strength for improved gait mechanics to increase tolerance without pain -Goal met; 4+/5 knee flexion strength  4. Pt will report increased amb tolerance to 4 blocks for progression of activity tolerance.  -Goal met; patient reports ability to amb 2 laps around park     Home exercise program established on initial evaluation and progressed as patient is able to address deficits. Patient now has all of the knowledge to continue with progress per HEP. RECOMMENDATIONS  Discontinue therapy. Progressing towards or have reached established goals. If you have any questions/comments please contact us directly at [phone]. Thank you for allowing us to assist in the care of your patient.     LPTA Signature: Jillian Gross Date: 5/2/17   Therapist Signature: Zita Stephens DPT  Time: 4:39 PM

## 2017-05-02 NOTE — ED NOTES
Purposeful rounding complete:    Side rails up x2: YES  Bed low and wheels are locked: YES  Call bell in reach: YES  Comfort Addressed: YES-  Patient provided a warm blanket and HOB elevated to 90 degrees  Toileting needs addressed: YES  Plan of care reviewed/updated with patient and family members: YES  IV site addressed: YES  Pain assessed and addressed: YES  Pain level:9/10

## 2017-05-02 NOTE — ED TRIAGE NOTES
Patient complains of onset of abdominal pain after lifting a small child at work. Also reports right leg and knee pain and left leg pain. Patient returned to work today after an abdominal surgery in February and just completed physical therapy for legs.

## 2017-05-03 VITALS
HEART RATE: 91 BPM | BODY MASS INDEX: 25.44 KG/M2 | OXYGEN SATURATION: 97 % | HEIGHT: 64 IN | SYSTOLIC BLOOD PRESSURE: 127 MMHG | TEMPERATURE: 98 F | WEIGHT: 149 LBS | RESPIRATION RATE: 20 BRPM | DIASTOLIC BLOOD PRESSURE: 80 MMHG

## 2017-05-03 NOTE — ED NOTES
Purposeful rounding complete:    Side rails up x2: YES  Bed low and wheels are locked: YES  Call bell in reach: YES  Comfort Addressed: YES  Toileting needs addressed: YES  Plan of care reviewed/updated with patient and family members: YES  IV site addressed: YES  Pain assessed and addressed: YES  Pain level:6

## 2017-05-03 NOTE — ED NOTES
Call placed to CT to determine time frame for CT completion. Informed that the test will be completed very shortly.

## 2017-05-03 NOTE — ED NOTES
Patient assisted up to wheelchair and taken to the bathroom.   Gripper socks provided and placed on patient

## 2017-05-03 NOTE — ED NOTES
Bedside shift change report given to Jesus Alberto Soto (oncoming nurse) by Darren Cabrera (offgoing nurse). Report included the following information ED Summary.

## 2017-05-03 NOTE — ED NOTES
Purposeful rounding complete:    Side rails up x2: YES  Bed low and wheels are locked: YES  Call bell in reach: YES  Comfort Addressed: YES  Toileting needs addressed: YES  Plan of care reviewed/updated with patient and family members: YES  IV site addressed: YES  Pain assessed and addressed: YES  Pain level:7

## 2017-05-08 NOTE — TELEPHONE ENCOUNTER
Please contact the patient to schedule an earlier appointment for medication refill due to the fact that the patient was recently in the ER and the medication was not prescribed by provider.  Thank you

## 2017-05-09 ENCOUNTER — HOSPITAL ENCOUNTER (EMERGENCY)
Age: 57
Discharge: HOME | End: 2017-05-09
Attending: EMERGENCY MEDICINE
Payer: COMMERCIAL

## 2017-05-09 VITALS
WEIGHT: 149 LBS | TEMPERATURE: 97.6 F | SYSTOLIC BLOOD PRESSURE: 140 MMHG | OXYGEN SATURATION: 100 % | HEART RATE: 93 BPM | BODY MASS INDEX: 25.58 KG/M2 | DIASTOLIC BLOOD PRESSURE: 91 MMHG | RESPIRATION RATE: 18 BRPM

## 2017-05-09 DIAGNOSIS — G89.29 CHRONIC PAIN OF LOWER EXTREMITY, UNSPECIFIED LATERALITY: ICD-10-CM

## 2017-05-09 DIAGNOSIS — R10.9 RECURRENT ABDOMINAL PAIN: Primary | ICD-10-CM

## 2017-05-09 DIAGNOSIS — M79.606 CHRONIC PAIN OF LOWER EXTREMITY, UNSPECIFIED LATERALITY: ICD-10-CM

## 2017-05-09 LAB
ALBUMIN SERPL BCP-MCNC: 3.4 G/DL (ref 3.4–5)
ALBUMIN/GLOB SERPL: 0.9 {RATIO} (ref 0.8–1.7)
ALP SERPL-CCNC: 132 U/L (ref 45–117)
ALT SERPL-CCNC: 26 U/L (ref 13–56)
ANION GAP BLD CALC-SCNC: 10 MMOL/L (ref 3–18)
APPEARANCE UR: CLEAR
AST SERPL W P-5'-P-CCNC: 12 U/L (ref 15–37)
BACTERIA URNS QL MICRO: NEGATIVE /HPF
BASOPHILS # BLD AUTO: 0 K/UL (ref 0–0.06)
BASOPHILS # BLD: 0 % (ref 0–2)
BILIRUB DIRECT SERPL-MCNC: <0.1 MG/DL (ref 0–0.2)
BILIRUB SERPL-MCNC: 0.2 MG/DL (ref 0.2–1)
BILIRUB UR QL: NEGATIVE
BUN SERPL-MCNC: 20 MG/DL (ref 7–18)
BUN/CREAT SERPL: 18 (ref 12–20)
CALCIUM SERPL-MCNC: 8.7 MG/DL (ref 8.5–10.1)
CHLORIDE SERPL-SCNC: 104 MMOL/L (ref 100–108)
CO2 SERPL-SCNC: 28 MMOL/L (ref 21–32)
COLOR UR: YELLOW
CREAT SERPL-MCNC: 1.14 MG/DL (ref 0.6–1.3)
DIFFERENTIAL METHOD BLD: NORMAL
EOSINOPHIL # BLD: 0.1 K/UL (ref 0–0.4)
EOSINOPHIL NFR BLD: 2 % (ref 0–5)
EPITH CASTS URNS QL MICRO: NORMAL /LPF (ref 0–5)
ERYTHROCYTE [DISTWIDTH] IN BLOOD BY AUTOMATED COUNT: 14 % (ref 11.6–14.5)
GLOBULIN SER CALC-MCNC: 3.8 G/DL (ref 2–4)
GLUCOSE BLD STRIP.AUTO-MCNC: 249 MG/DL (ref 70–110)
GLUCOSE BLD STRIP.AUTO-MCNC: 73 MG/DL (ref 70–110)
GLUCOSE BLD STRIP.AUTO-MCNC: 99 MG/DL (ref 70–110)
GLUCOSE SERPL-MCNC: 174 MG/DL (ref 74–99)
GLUCOSE UR STRIP.AUTO-MCNC: >1000 MG/DL
HCT VFR BLD AUTO: 42.8 % (ref 35–45)
HGB BLD-MCNC: 14.1 G/DL (ref 12–16)
HGB UR QL STRIP: ABNORMAL
KETONES UR QL STRIP.AUTO: NEGATIVE MG/DL
LEUKOCYTE ESTERASE UR QL STRIP.AUTO: NEGATIVE
LIPASE SERPL-CCNC: 346 U/L (ref 73–393)
LYMPHOCYTES # BLD AUTO: 26 % (ref 21–52)
LYMPHOCYTES # BLD: 2.1 K/UL (ref 0.9–3.6)
MCH RBC QN AUTO: 28.7 PG (ref 24–34)
MCHC RBC AUTO-ENTMCNC: 32.9 G/DL (ref 31–37)
MCV RBC AUTO: 87.2 FL (ref 74–97)
MONOCYTES # BLD: 0.7 K/UL (ref 0.05–1.2)
MONOCYTES NFR BLD AUTO: 8 % (ref 3–10)
NEUTS SEG # BLD: 5.1 K/UL (ref 1.8–8)
NEUTS SEG NFR BLD AUTO: 64 % (ref 40–73)
NITRITE UR QL STRIP.AUTO: NEGATIVE
PH UR STRIP: 6.5 [PH] (ref 5–8)
PLATELET # BLD AUTO: 299 K/UL (ref 135–420)
PMV BLD AUTO: 10.6 FL (ref 9.2–11.8)
POTASSIUM SERPL-SCNC: 3.7 MMOL/L (ref 3.5–5.5)
PROT SERPL-MCNC: 7.2 G/DL (ref 6.4–8.2)
PROT UR STRIP-MCNC: NEGATIVE MG/DL
RBC # BLD AUTO: 4.91 M/UL (ref 4.2–5.3)
RBC #/AREA URNS HPF: NORMAL /HPF (ref 0–5)
SODIUM SERPL-SCNC: 142 MMOL/L (ref 136–145)
SP GR UR REFRACTOMETRY: 1.02 (ref 1–1.03)
UROBILINOGEN UR QL STRIP.AUTO: 0.2 EU/DL (ref 0.2–1)
WBC # BLD AUTO: 8.1 K/UL (ref 4.6–13.2)
WBC URNS QL MICRO: NORMAL /HPF (ref 0–4)

## 2017-05-09 PROCEDURE — 74011636637 HC RX REV CODE- 636/637: Performed by: EMERGENCY MEDICINE

## 2017-05-09 PROCEDURE — 82962 GLUCOSE BLOOD TEST: CPT

## 2017-05-09 PROCEDURE — 99285 EMERGENCY DEPT VISIT HI MDM: CPT

## 2017-05-09 PROCEDURE — 96375 TX/PRO/DX INJ NEW DRUG ADDON: CPT

## 2017-05-09 PROCEDURE — 74011250636 HC RX REV CODE- 250/636: Performed by: EMERGENCY MEDICINE

## 2017-05-09 PROCEDURE — 83690 ASSAY OF LIPASE: CPT | Performed by: EMERGENCY MEDICINE

## 2017-05-09 PROCEDURE — 74011250637 HC RX REV CODE- 250/637: Performed by: EMERGENCY MEDICINE

## 2017-05-09 PROCEDURE — 96361 HYDRATE IV INFUSION ADD-ON: CPT

## 2017-05-09 PROCEDURE — 80048 BASIC METABOLIC PNL TOTAL CA: CPT | Performed by: EMERGENCY MEDICINE

## 2017-05-09 PROCEDURE — 85025 COMPLETE CBC W/AUTO DIFF WBC: CPT | Performed by: EMERGENCY MEDICINE

## 2017-05-09 PROCEDURE — 81001 URINALYSIS AUTO W/SCOPE: CPT | Performed by: EMERGENCY MEDICINE

## 2017-05-09 PROCEDURE — 74011250636 HC RX REV CODE- 250/636

## 2017-05-09 PROCEDURE — 80076 HEPATIC FUNCTION PANEL: CPT | Performed by: EMERGENCY MEDICINE

## 2017-05-09 PROCEDURE — 96374 THER/PROPH/DIAG INJ IV PUSH: CPT

## 2017-05-09 RX ORDER — ONDANSETRON 2 MG/ML
4 INJECTION INTRAMUSCULAR; INTRAVENOUS
Status: COMPLETED | OUTPATIENT
Start: 2017-05-09 | End: 2017-05-09

## 2017-05-09 RX ORDER — HYDROMORPHONE HYDROCHLORIDE 1 MG/ML
1 INJECTION, SOLUTION INTRAMUSCULAR; INTRAVENOUS; SUBCUTANEOUS
Status: COMPLETED | OUTPATIENT
Start: 2017-05-09 | End: 2017-05-09

## 2017-05-09 RX ORDER — TRAMADOL HYDROCHLORIDE 50 MG/1
50 TABLET ORAL
Qty: 12 TAB | Refills: 0 | Status: SHIPPED | OUTPATIENT
Start: 2017-05-09 | End: 2017-05-15

## 2017-05-09 RX ORDER — HYDROMORPHONE HYDROCHLORIDE 2 MG/ML
INJECTION, SOLUTION INTRAMUSCULAR; INTRAVENOUS; SUBCUTANEOUS
Status: DISCONTINUED
Start: 2017-05-09 | End: 2017-05-09 | Stop reason: HOSPADM

## 2017-05-09 RX ORDER — HYDROCODONE BITARTRATE AND ACETAMINOPHEN 5; 325 MG/1; MG/1
1 TABLET ORAL
Status: COMPLETED | OUTPATIENT
Start: 2017-05-09 | End: 2017-05-09

## 2017-05-09 RX ORDER — KETOROLAC TROMETHAMINE 30 MG/ML
30 INJECTION, SOLUTION INTRAMUSCULAR; INTRAVENOUS
Status: COMPLETED | OUTPATIENT
Start: 2017-05-09 | End: 2017-05-09

## 2017-05-09 RX ADMIN — HYDROCODONE BITARTRATE AND ACETAMINOPHEN 1 TABLET: 5; 325 TABLET ORAL at 05:54

## 2017-05-09 RX ADMIN — HYDROMORPHONE HYDROCHLORIDE 1 MG: 1 INJECTION, SOLUTION INTRAMUSCULAR; INTRAVENOUS; SUBCUTANEOUS at 04:10

## 2017-05-09 RX ADMIN — ONDANSETRON 4 MG: 2 INJECTION INTRAMUSCULAR; INTRAVENOUS at 04:27

## 2017-05-09 RX ADMIN — KETOROLAC TROMETHAMINE 30 MG: 30 INJECTION, SOLUTION INTRAMUSCULAR at 04:33

## 2017-05-09 RX ADMIN — INSULIN HUMAN 4 UNITS: 100 INJECTION, SOLUTION PARENTERAL at 04:34

## 2017-05-09 RX ADMIN — SODIUM CHLORIDE 1000 ML: 900 INJECTION, SOLUTION INTRAVENOUS at 04:36

## 2017-05-09 NOTE — ED NOTES
Patient states that she has midline abdominal pain. Patient states she also has been urinating frequently and has lumps on the back of her left leg. Patient does say that she has an outpatient appointment scheduled for natalya and drainage of the abscess on the back of her knee.

## 2017-05-09 NOTE — ED TRIAGE NOTES
Pt c/o pains to L upper leg, states she feels \"knots. \" Also c/o increased urination, abd pain, nausea. States she ran out of strips to check her blood sugar.

## 2017-05-09 NOTE — ED PROVIDER NOTES
HPI Comments: Lyudmila Su is a 62 y.o. Female with recurrent abd pain in area of lower incision from surgery earlier this year that started back this past weekend with also continued chronic leg pain. No vomiting, diarrhea. Also with urinary freq. No hematuria. Pain is sharp intermittent, keeping her up at night. Had improved last week from when I saw her but now returned    The history is provided by the patient. Past Medical History:   Diagnosis Date    Abdominal adhesions     Anemia     Arthritis     all over the body    Asthma     Asthma     Diabetes ([address]     Diabetes mellitus     Dyskinesia     bilateral    Essential hypertension     GERD (gastroesophageal reflux disease)     Hypertension     Menopause     Stool color black        Past Surgical History:   Procedure Laterality Date    HX CHOLECYSTECTOMY  6/28/10    HX COLONOSCOPY      HX ENDOSCOPY      HX HERNIA REPAIR      HX HYSTERECTOMY      Fibroids    HX KNEE REPLACEMENT      HX OOPHORECTOMY      TISSUE LOCALIZATION-EXCISION           Family History:   Problem Relation Age of Onset    Hypertension Other      parent    Breast Cancer Other 21    Heart Disease Father     Hypertension Father     Diabetes Father     Diabetes Mother     Hypertension Other      sibling    Diabetes Other      parent    Diabetes Sister     Kidney Disease Brother     Diabetes Brother        Social History     Social History    Marital status:      Spouse name: N/A    Number of children: N/A    Years of education: N/A     Occupational History    Not on file.      Social History Main Topics    Smoking status: Never Smoker    Smokeless tobacco: Never Used    Alcohol use No    Drug use: No    Sexual activity: Yes     Partners: Male     Birth control/ protection: None     Other Topics Concern    Not on file     Social History Narrative         ALLERGIES: Macrodantin [nitrofurantoin macrocrystalline] and Tape [adhesive]    Review of Systems   Constitutional: Negative for fever. HENT: Negative for sore throat. Eyes: Negative for visual disturbance. Respiratory: Negative for cough and shortness of breath. Cardiovascular: Negative for chest pain. Gastrointestinal: Positive for abdominal pain. Negative for blood in stool and constipation. Endocrine: Negative for polyuria. Genitourinary: Positive for frequency. Negative for difficulty urinating, dysuria and hematuria. Musculoskeletal: Negative for gait problem. Skin: Negative for rash. Neurological: Negative for syncope. Hematological: Does not bruise/bleed easily. Psychiatric/Behavioral: Positive for sleep disturbance. Vitals:    05/09/17 0430 05/09/17 0445 05/09/17 0500 05/09/17 0515   BP: 134/81 129/74 127/83 116/80   Pulse:       Resp:       Temp:       SpO2: 100% 98% 99% 99%   Weight:                Physical Exam   Constitutional: She is oriented to person, place, and time. She appears well-developed and well-nourished. No distress. HENT:   Head: Normocephalic and atraumatic. Right Ear: External ear normal.   Left Ear: External ear normal.   Nose: Nose normal.   Mouth/Throat: Uvula is midline, oropharynx is clear and moist and mucous membranes are normal.   Eyes: Conjunctivae are normal. No scleral icterus. Neck: Neck supple. Cardiovascular: Normal rate, regular rhythm, normal heart sounds and intact distal pulses. Pulmonary/Chest: Effort normal and breath sounds normal.   Abdominal: Soft. Normal appearance. She exhibits no distension and no mass. There is tenderness. There is guarding. There is no rebound. Musculoskeletal: She exhibits no edema. Neurological: She is alert and oriented to person, place, and time. Gait normal.   Skin: Skin is warm and dry. She is not diaphoretic. Psychiatric: Her behavior is normal.   Nursing note and vitals reviewed.        [facility]  ED Course       Procedures      Vitals:  Patient Vitals for the past 12 hrs:   Temp Pulse Resp BP SpO2   05/09/17 0515 - - - 116/80 99 %   05/09/17 0500 - - - 127/83 99 %   05/09/17 0445 - - - 129/74 98 %   05/09/17 0430 - - - 134/81 100 %   05/09/17 0415 - - - 115/70 99 %   05/09/17 0400 - - - 120/81 100 %   05/09/17 0331 97.6 °F (36.4 °C) 93 18 137/87 100 %         Medications ordered:   Medications   HYDROmorphone (DILAUDID) 2 mg/mL injection (not administered)   HYDROcodone-acetaminophen (NORCO) 5-325 mg per tablet 1 Tab (not administered)   insulin regular (NOVOLIN R, HUMULIN R) injection 4 Units (4 Units IntraVENous Given 5/9/17 0434)   sodium chloride 0.9 % bolus infusion 1,000 mL (1,000 mL IntraVENous New Bag 5/9/17 0436)   ketorolac (TORADOL) injection 30 mg (30 mg IntraVENous Given 5/9/17 0433)   ondansetron (ZOFRAN) injection 4 mg (4 mg IntraVENous Given 5/9/17 0427)   HYDROmorphone (PF) (DILAUDID) injection 1 mg (1 mg IntraVENous Given 5/9/17 0410)         Lab findings:  Recent Results (from the past 12 hour(s))   GLUCOSE, POC    Collection Time: 05/09/17  3:34 AM   Result Value Ref Range    Glucose (POC) 249 (H) 70 - 110 mg/dL   CBC WITH AUTOMATED DIFF    Collection Time: 05/09/17  3:45 AM   Result Value Ref Range    WBC 8.1 4.6 - 13.2 K/uL    RBC 4.91 4.20 - 5.30 M/uL    HGB 14.1 12.0 - 16.0 g/dL    HCT 42.8 35.0 - 45.0 %    MCV 87.2 74.0 - 97.0 FL    MCH 28.7 24.0 - 34.0 PG    MCHC 32.9 31.0 - 37.0 g/dL    RDW 14.0 11.6 - 14.5 %    PLATELET 287 365 - 256 K/uL    MPV 10.6 9.2 - 11.8 FL    NEUTROPHILS 64 40 - 73 %    LYMPHOCYTES 26 21 - 52 %    MONOCYTES 8 3 - 10 %    EOSINOPHILS 2 0 - 5 %    BASOPHILS 0 0 - 2 %    ABS. NEUTROPHILS 5.1 1.8 - 8.0 K/UL    ABS. LYMPHOCYTES 2.1 0.9 - 3.6 K/UL    ABS. MONOCYTES 0.7 0.05 - 1.2 K/UL    ABS. EOSINOPHILS 0.1 0.0 - 0.4 K/UL    ABS.  BASOPHILS 0.0 0.0 - 0.06 K/UL    DF AUTOMATED     HEPATIC FUNCTION PANEL    Collection Time: 05/09/17  3:45 AM   Result Value Ref Range    Protein, total 7.2 6.4 - 8.2 g/dL    Albumin 3.4 3.4 - 5.0 g/dL    Globulin 3.8 2.0 - 4.0 g/dL    A-G Ratio 0.9 0.8 - 1.7      Bilirubin, total 0.2 0.2 - 1.0 MG/DL    Bilirubin, direct <0.1 0.0 - 0.2 MG/DL    Alk. phosphatase 132 (H) 45 - 117 U/L    AST (SGOT) 12 (L) 15 - 37 U/L    ALT (SGPT) 26 13 - 56 U/L   LIPASE    Collection Time: 05/09/17  3:45 AM   Result Value Ref Range    Lipase 346 73 - 036 U/L   METABOLIC PANEL, BASIC    Collection Time: 05/09/17  3:45 AM   Result Value Ref Range    Sodium 142 136 - 145 mmol/L    Potassium 3.7 3.5 - 5.5 mmol/L    Chloride 104 100 - 108 mmol/L    CO2 28 21 - 32 mmol/L    Anion gap 10 3.0 - 18 mmol/L    Glucose 174 (H) 74 - 99 mg/dL    BUN 20 (H) 7.0 - 18 MG/DL    Creatinine 1.14 0.6 - 1.3 MG/DL    BUN/Creatinine ratio 18 12 - 20      GFR est AA 60 (L) >60 ml/min/1.73m2    GFR est non-AA 49 (L) >60 ml/min/1.73m2    Calcium 8.7 8.5 - 10.1 MG/DL   URINALYSIS W/ RFLX MICROSCOPIC    Collection Time: 05/09/17  4:15 AM   Result Value Ref Range    Color YELLOW      Appearance CLEAR      Specific gravity 1.018 1.005 - 1.030      pH (UA) 6.5 5.0 - 8.0      Protein NEGATIVE  NEG mg/dL    Glucose >1000 (A) NEG mg/dL    Ketone NEGATIVE  NEG mg/dL    Bilirubin NEGATIVE  NEG      Blood LARGE (A) NEG      Urobilinogen 0.2 0.2 - 1.0 EU/dL    Nitrites NEGATIVE  NEG      Leukocyte Esterase NEGATIVE  NEG     URINE MICROSCOPIC ONLY    Collection Time: 05/09/17  4:15 AM   Result Value Ref Range    WBC 0 to 3 0 - 4 /hpf    RBC 11 to 20 0 - 5 /hpf    Epithelial cells 1+ 0 - 5 /lpf    Bacteria NEGATIVE  NEG /hpf   GLUCOSE, POC    Collection Time: 05/09/17  5:33 AM   Result Value Ref Range    Glucose (POC) 73 70 - 110 mg/dL       EKG interpretation by ED Physician:      X-Ray, CT or other radiology findings or impressions:  No orders to display       Progress notes, Consult notes or additional Procedure notes:   Pain improved. Doubt need for repeat imaging.  Suspect pt will need some type of pain management as she has had now recurrent visits for pain, and should also see her surgeon as well which was d/w her    Reevaluation of patient:   Stable for d/c     Disposition:  Diagnosis:   1. Recurrent abdominal pain    2. Chronic pain of lower extremity, unspecified laterality        Disposition: home      Follow-up Information     Follow up With Details Comments [address], [place] Schedule an appointment as soon as possible for a visit  [address] [facility] [place]  [address]  [phone]              Patient's Medications   Start Taking    TRAMADOL (ULTRAM) 50 MG TABLET    Take 1 Tab by mouth every six (6) hours as needed for Pain. Max Daily Amount: 200 mg. Continue Taking    ALBUTEROL (PROVENTIL HFA, VENTOLIN HFA, PROAIR HFA) 90 MCG/ACTUATION INHALER    Take 1 Puff by inhalation every six (6) hours as needed for Wheezing. AMLODIPINE (NORVASC) 2.5 MG TABLET    Take 2.5 mg by mouth nightly. ATORVASTATIN (LIPITOR) 20 MG TABLET    Take 40 mg by mouth daily. BISOPROLOL-HYDROCHLOROTHIAZIDE (ZIAC) 2.5-6.25 MG PER TABLET    Take 1 Tab by mouth daily. DAPAGLIFLOZIN (FARXIGA) 10 MG TAB    Take  by mouth daily. Indications: type 2 diabetes mellitus    ESCITALOPRAM OXALATE (LEXAPRO) 10 MG TABLET    Take 1 Tab by mouth daily. GLUCOSE BLOOD VI TEST STRIPS (ONETOUCH ULTRA TEST) STRIP    Check blood glucose as directed    HYDROCODONE-ACETAMINOPHEN (NORCO) 5-325 MG PER TABLET    Take 1 Tab by mouth every six (6) hours as needed for Pain. Max Daily Amount: 4 Tabs. METFORMIN (GLUMETZA) 500 MG TG24 24 HOUR TABLET    Take 500 mg by mouth two (2) times a day. OMEPRAZOLE (PRILOSEC) 10 MG CAPSULE    Take 10 mg by mouth daily. ONDANSETRON (ZOFRAN ODT) 4 MG DISINTEGRATING TABLET    Take 1 Tab by mouth every eight (8) hours as needed for Nausea. ONDANSETRON HCL (ZOFRAN, AS HYDROCHLORIDE,) 4 MG TABLET    Take 1 Tab by mouth every eight (8) hours as needed for Nausea.    These Medications have changed    No medications on file   Stop Taking    No medications on file

## 2017-05-09 NOTE — LETTER
[facility] EMERGENCY DEPT 
[address] [place] [id number] 
[phone] Work/School Note Date: 5/9/2017 To Whom It May concern: 
 
Marleni Landaverde was seen and treated today in the emergency room by the following : 
Dr. Leticia Cintron Marleni Landaverde may return to work on 5/10/17. Sincerely, [facility]

## 2017-05-09 NOTE — ED NOTES
Patient rates pain at 6/10 down from 10/10. Patient states she is much more comfortable. Purposeful rounding completed:    Side rails up x 1:  YES  Bed low and wheels and locked: YES  Call bell in reach: YES  Comfort addressed: YES    Toileting needs addressed: YES  Plan of care reviewed/updated with patient and or family members: YES  IV site assessed: YES  Pain assessed and addressed: YES, 6.

## 2017-05-12 ENCOUNTER — OFFICE VISIT (OUTPATIENT)
Dept: FAMILY MEDICINE CLINIC | Age: 57
End: 2017-05-12

## 2017-05-12 VITALS
RESPIRATION RATE: 16 BRPM | SYSTOLIC BLOOD PRESSURE: 131 MMHG | OXYGEN SATURATION: 100 % | DIASTOLIC BLOOD PRESSURE: 81 MMHG | HEIGHT: 64 IN | WEIGHT: 154.2 LBS | BODY MASS INDEX: 26.32 KG/M2 | TEMPERATURE: 95.3 F | HEART RATE: 78 BPM

## 2017-05-12 DIAGNOSIS — D17.24 LIPOMA OF LEFT LOWER EXTREMITY: ICD-10-CM

## 2017-05-12 DIAGNOSIS — I10 ESSENTIAL HYPERTENSION: ICD-10-CM

## 2017-05-12 DIAGNOSIS — R10.9 CHRONIC ABDOMINAL PAIN: ICD-10-CM

## 2017-05-12 DIAGNOSIS — E11.65 TYPE 2 DIABETES MELLITUS WITH HYPERGLYCEMIA, WITHOUT LONG-TERM CURRENT USE OF INSULIN (HCC): ICD-10-CM

## 2017-05-12 DIAGNOSIS — I83.811 VARICOSE VEINS OF LEG WITH PAIN, RIGHT: Primary | ICD-10-CM

## 2017-05-12 DIAGNOSIS — G89.29 CHRONIC ABDOMINAL PAIN: ICD-10-CM

## 2017-05-12 NOTE — PATIENT INSTRUCTIONS
Varicose Veins: Care Instructions  Your Care Instructions  Varicose veins are twisted, enlarged veins near the surface of the skin. They develop most often in the legs and ankles. Some people may be more likely than others to get varicose veins because of aging or hormone changes or because a parent has them. Being overweight or pregnant can make varicose veins worse. Jobs that require standing for long periods of time also can make them worse. Follow-up care is a key part of your treatment and safety. Be sure to make and go to all appointments, and call your doctor if you are having problems. It's also a good idea to know your test results and keep a list of the medicines you take. How can you care for yourself at home? · Wear compression stockings during the day to help relieve symptoms. They improve blood flow and are the main treatment for varicose veins. Talk to your doctor about which ones to get and where to get them. · Prop up your legs at or above the level of your heart when possible. This helps keep the blood from pooling in your lower legs and improves blood flow to the rest of your body. · Avoid sitting and standing for long periods. This puts added stress on your veins. · Get regular exercise, and control your weight. Walk, bicycle, or swim to improve blood flow in your legs. · If you bump your leg so hard that you know it is likely to bruise, prop up your leg and put ice or a cold pack on the area for 10 to 20 minutes at a time. Try to do this every 1 to 2 hours for the next 3 days (when you are awake) or until the swelling goes down. Put a thin cloth between the ice and your skin. · If you cut or scratch the skin over a vein, it may bleed a lot. Prop up your leg and apply firm pressure with a clean bandage over the site of the bleeding. Continue to apply pressure for a full 15 minutes. Do not check sooner to see if the bleeding has stopped.  If the bleeding has not stopped after 15 minutes, apply pressure again for another 15 minutes. You can repeat this up to 3 times for a total of 45 minutes. If you have a blood clot in a varicose vein, you may have tenderness and swelling over the vein. The vein may feel firm. Be sure to call your doctor right away if you have these symptoms. If your doctor has told you how to care for the clot, follow his or her instructions. Care may include the following:  · Prop up your leg and apply heat with a warm, damp cloth or a heating pad set on low (put a towel or cloth between your leg and the heating pad to prevent burns). · Ask your doctor if you can take an over-the-counter pain medicine, such as acetaminophen (Tylenol), ibuprofen (Advil, Motrin), or naproxen (Aleve). Be safe with medicines. Read and follow all instructions on the label. When should you call for help? Call 911 anytime you think you may need emergency care. For example, call if:  · You have sudden chest pain and shortness of breath, or you cough up blood. Call your doctor now or seek immediate medical care if:  · You have signs of a blood clot, such as:  ¨ Pain in your calf, back of the knee, thigh, or groin. ¨ Redness and swelling in your leg or groin. · A varicose vein begins to bleed and you cannot stop it. · You have a tender lump in your leg. · You get an open sore. Watch closely for changes in your health, and be sure to contact your doctor if:  · Your varicose vein symptoms do not improve with home treatment. Where can you learn more? Go to http://betzy-didi.info/. Enter W798 in the search box to learn more about \"Varicose Veins: Care Instructions. \"  Current as of: June 4, 2016  Content Version: 11.2  © 6969-5002 [employer]. Care instructions adapted under license by [employer] (which disclaims liability or warranty for this information).  If you have questions about a medical condition or this instruction, always ask your healthcare professional. [address] any warranty or liability for your use of this information.

## 2017-05-12 NOTE — MR AVS SNAPSHOT
Visit Information Date & Time Provider Department Dept. Phone Encounter #  
 5/12/2017  3:30 PM Júnior Felipe, [address] [phone] [id number] Your Appointments 5/26/2017  3:30 PM  
PROCEDURE with Júnior Felipe MD  
[address]) Appt Note: Removal of growth in left thigh. [address] [facility] [facility] [address]  
  
   
 [address] [facility] [address] Upcoming Health Maintenance Date Due Hepatitis C Screening 1960 EYE EXAM RETINAL OR DILATED Q1 2/4/1970 Pneumococcal 19-64 Medium Risk (1 of 1 - PPSV23) 2/4/1979 DTaP/Tdap/Td series (1 - Tdap) 2/4/1981 PAP AKA CERVICAL CYTOLOGY 2/4/1981 INFLUENZA AGE 9 TO ADULT 8/1/2017 HEMOGLOBIN A1C Q6M 9/27/2017 FOOT EXAM Q1 3/27/2018 MICROALBUMIN Q1 3/27/2018 LIPID PANEL Q1 3/27/2018 BREAST CANCER SCRN MAMMOGRAM 1/6/2019 COLONOSCOPY 3/3/2026 Allergies as of 5/12/2017  Review Complete On: 5/12/2017 By: Júnior Felipe MD  
  
 Severity Noted Reaction Type Reactions Macrodantin [Nitrofurantoin Macrocrystalline]  01/31/2012    Itching Tape [Adhesive]  09/30/2014    Other (comments) Burned skin when had wound vac Current Immunizations  Reviewed on 2/22/2017 Name Date Influenza Vaccine 11/15/2016 Not reviewed this visit You Were Diagnosed With   
  
 Codes Comments Varicose veins of leg with pain, right    -  Primary ICD-10-CM: I40.362 ICD-9-CM: 454.8 Lipoma of left lower extremity     ICD-10-CM: D17.24 ICD-9-CM: 214.1 Essential hypertension     ICD-10-CM: I10 
ICD-9-CM: 401.9 Type 2 diabetes mellitus with hyperglycemia, without long-term current use of insulin (HCC)     ICD-10-CM: E11.65 ICD-9-CM: 250.00, 790.29 Chronic abdominal pain     ICD-10-CM: R10.9, G89.29 ICD-9-CM: 789.00, 338.29 Vitals BP Pulse Temp Resp Height(growth percentile) Weight(growth percentile) 131/81 (BP 1 Location: Right arm, BP Patient Position: Sitting) 78 95.3 °F (35.2 °C) (Oral) 16 5' 4\" (1.626 m) 154 lb 3.2 oz (69.9 kg) SpO2 BMI OB Status Smoking Status 100% 26.47 kg/m2 Hysterectomy Never Smoker BMI and BSA Data Body Mass Index Body Surface Area  
 26.47 kg/m 2 1.78 m 2 Preferred Pharmacy Pharmacy Name Phone [facility]  [facility] [phone] Your Updated Medication List  
  
   
This list is accurate as of: 5/12/17  4:09 PM.  Always use your most recent med list.  
  
  
  
  
 albuterol 90 mcg/actuation inhaler Commonly known as:  PROVENTIL HFA, VENTOLIN HFA, PROAIR HFA Take 1 Puff by inhalation every six (6) hours as needed for Wheezing. amLODIPine 2.5 mg tablet Commonly known as:  Estela Calderon Take 2.5 mg by mouth nightly. dapagliflozin 10 mg Tab Commonly known as:  [employer] Take 1 Tab by mouth daily. Indications: type 2 diabetes mellitus  
  
 escitalopram oxalate 10 mg tablet Commonly known as:  Hari Suárez Take 1 Tab by mouth daily. glucose blood VI test strips strip Commonly known as:  ONETOUCH ULTRA TEST Check blood glucose as directed GLUMETZA 500 mg Tg24 24 hour tablet Generic drug:  metFORMIN Take 500 mg by mouth two (2) times a day. HYDROcodone-acetaminophen 5-325 mg per tablet Commonly known as:  Jeffrey Denis Take 1 Tab by mouth every six (6) hours as needed for Pain. Max Daily Amount: 4 Tabs. LIPITOR 20 mg tablet Generic drug:  atorvastatin Take 40 mg by mouth daily. omeprazole 10 mg capsule Commonly known as:  PRILOSEC Take 10 mg by mouth daily. ondansetron 4 mg disintegrating tablet Commonly known as:  ZOFRAN ODT Take 1 Tab by mouth every eight (8) hours as needed for Nausea. ondansetron hcl 4 mg tablet Commonly known as:  ZOFRAN (AS HYDROCHLORIDE) Take 1 Tab by mouth every eight (8) hours as needed for Nausea. traMADol 50 mg tablet Commonly known as:  ULTRAM  
Take 1 Tab by mouth every six (6) hours as needed for Pain. Max Daily Amount: 200 mg. ZIAC 2.5-6.25 mg per tablet Generic drug:  bisoprolol-hydroCHLOROthiazide Take 1 Tab by mouth daily. Prescriptions Sent to Pharmacy Refills  
 dapagliflozin (FARXIGA) 10 mg tab 3 Sig: Take 1 Tab by mouth daily. Indications: type 2 diabetes mellitus Class: Normal  
 Pharmacy: [facility] [facility] #: [phone] Route: Oral  
  
Patient Instructions Varicose Veins: Care Instructions Your Care Instructions Varicose veins are twisted, enlarged veins near the surface of the skin. They develop most often in the legs and ankles. Some people may be more likely than others to get varicose veins because of aging or hormone changes or because a parent has them. Being overweight or pregnant can make varicose veins worse. Jobs that require standing for long periods of time also can make them worse. Follow-up care is a key part of your treatment and safety. Be sure to make and go to all appointments, and call your doctor if you are having problems. It's also a good idea to know your test results and keep a list of the medicines you take. How can you care for yourself at home? · Wear compression stockings during the day to help relieve symptoms. They improve blood flow and are the main treatment for varicose veins. Talk to your doctor about which ones to get and where to get them. · Prop up your legs at or above the level of your heart when possible. This helps keep the blood from pooling in your lower legs and improves blood flow to the rest of your body. · Avoid sitting and standing for long periods. This puts added stress on your veins. · Get regular exercise, and control your weight. Walk, bicycle, or swim to improve blood flow in your legs. · If you bump your leg so hard that you know it is likely to bruise, prop up your leg and put ice or a cold pack on the area for 10 to 20 minutes at a time. Try to do this every 1 to 2 hours for the next 3 days (when you are awake) or until the swelling goes down. Put a thin cloth between the ice and your skin. · If you cut or scratch the skin over a vein, it may bleed a lot. Prop up your leg and apply firm pressure with a clean bandage over the site of the bleeding. Continue to apply pressure for a full 15 minutes. Do not check sooner to see if the bleeding has stopped. If the bleeding has not stopped after 15 minutes, apply pressure again for another 15 minutes. You can repeat this up to 3 times for a total of 45 minutes. If you have a blood clot in a varicose vein, you may have tenderness and swelling over the vein. The vein may feel firm. Be sure to call your doctor right away if you have these symptoms. If your doctor has told you how to care for the clot, follow his or her instructions. Care may include the following: · Prop up your leg and apply heat with a warm, damp cloth or a heating pad set on low (put a towel or cloth between your leg and the heating pad to prevent burns). · Ask your doctor if you can take an over-the-counter pain medicine, such as acetaminophen (Tylenol), ibuprofen (Advil, Motrin), or naproxen (Aleve). Be safe with medicines. Read and follow all instructions on the label. When should you call for help? Call 911 anytime you think you may need emergency care. For example, call if: 
· You have sudden chest pain and shortness of breath, or you cough up blood. Call your doctor now or seek immediate medical care if: 
· You have signs of a blood clot, such as: 
¨ Pain in your calf, back of the knee, thigh, or groin. ¨ Redness and swelling in your leg or groin. · A varicose vein begins to bleed and you cannot stop it. · You have a tender lump in your leg. · You get an open sore. Watch closely for changes in your health, and be sure to contact your doctor if: 
· Your varicose vein symptoms do not improve with home treatment. Where can you learn more? Go to http://betzy-didi.info/. Enter E820 in the search box to learn more about \"Varicose Veins: Care Instructions. \" Current as of: June 4, 2016 Content Version: 11.2 © 4433-2345 [employer]. Care instructions adapted under license by [employer] (which disclaims liability or warranty for this information). If you have questions about a medical condition or this instruction, always ask your healthcare professional. [address] any warranty or liability for your use of this information. Introducing [facility] HE[facility]! Ruba Waller introduces [facility] patient portal. Now you can access parts of your medical record, email your doctor's office, and request medication refills online. 1. In your internet browser, go to [email or web address] 2. Click on the First Time User? Click Here link in the Sign In box. You will see the New Member Sign Up page. 3. Enter your [facility] Access Code exactly as it appears below. You will not need to use this code after youve completed the sign-up process. If you do not sign up before the expiration date, you must request a new code. · [employer] [facility] Access Code: [email or web address] Expires: 7/23/2017 10:48 AM 
 
4. Enter the last four digits of your Social Security Number (xxxx) and Date of Birth (mm/dd/yyyy) as indicated and click Submit. You will be taken to the next sign-up page. 5. Create a [facility] ID. This will be your [facility] login ID and cannot be changed, so think of one that is secure and easy to remember. 6. Create a [facility] password. You can change your password at any time. 7. Enter your Password Reset Question and Answer. This can be used at a later time if you forget your password. 8. Enter your e-mail address. You will receive e-mail notification when new information is available in [address] 9. Click Sign Up. You can now view and download portions of your medical record. 10. Click the Download Summary menu link to download a portable copy of your medical information. If you have questions, please visit the Frequently Asked Questions section of the [employer] website. Remember, [employer] is NOT to be used for urgent needs. For medical emergencies, dial 911. Now available from your iPhone and Android! Please provide this summary of care documentation to your next provider. Your primary care clinician is listed as Aiden Mercado. If you have any questions after today's visit, please call [phone].

## 2017-05-12 NOTE — PROGRESS NOTES
Ilya Aguirre is a 62 y.o female that is present in the office for a same day sick appointment for procedure. Spot behind the left knee. 1. Have you been to the ER, urgent care clinic since your last visit? Hospitalized since your last visit? Yes [place] Tuesday 5/9/2017    2. Have you seen or consulted any other health care providers outside of the [address] [place] [address] since your last visit? Include any pap smears or colon screening.  No

## 2017-05-12 NOTE — PROGRESS NOTES
Giselle Soria is a 62 y.o.  female and presents with    Chief Complaint   Patient presents with    Mass     Subjective:  mrs. Sonia Trammell presents for f/u after ER visit for abdominal pain. she continues to have pain intermittently. she was prescribed NSAID in the ER and this improved her abdomen and her leg pain. she was also found to have elevated glucose and received insulin in the ER. she is requesting a refill of farxiga. Pain in abdomen is worse in the evening. 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 This lifting increases the pain. She is here for f/u imaging. She has ongoing in lump behind her left leg; she is complaining of lump on the back of her right leg. Lump is painful. Pain increases with sitting and with pressure on lump. She found lump last week. She denies lower extremity swelling. Patient Active Problem List   Diagnosis Code    Osteoarthritis of left knee M17.12    Hypertension I10    Hyperlipidemia E78.5    GERD (gastroesophageal reflux disease) K21.9    Asthma J45.909    Type 2 diabetes mellitus ([facility] [address] E11.9    SBO (small bowel obstruction) ([facility] [address] K56.69    Abdominal pain R10.9    Post-operative pain G89.18     Patient Active Problem List    Diagnosis Date Noted    Abdominal pain 03/14/2017    Post-operative pain 03/14/2017    SBO (small bowel obstruction) ([facility] [address] 02/21/2017    Osteoarthritis of left knee 06/27/2016    Hypertension 06/27/2016    Hyperlipidemia 06/27/2016    GERD (gastroesophageal reflux disease) 06/27/2016    Asthma 06/27/2016    Type 2 diabetes mellitus ([facility] [address] 06/27/2016     Current Outpatient Prescriptions   Medication Sig Dispense Refill    traMADol (ULTRAM) 50 mg tablet Take 1 Tab by mouth every six (6) hours as needed for Pain.  Max Daily Amount: 200 mg. 12 Tab 0    HYDROcodone-acetaminophen (NORCO) 5-325 mg per tablet Take 1 Tab by mouth every six (6) hours as needed for Pain. Max Daily Amount: 4 Tabs. 6 Tab 0    ondansetron (ZOFRAN ODT) 4 mg disintegrating tablet Take 1 Tab by mouth every eight (8) hours as needed for Nausea. 8 Tab 0    glucose blood VI test strips (ONETOUCH ULTRA TEST) strip Check blood glucose as directed 100 Strip 1    ondansetron hcl (ZOFRAN, AS HYDROCHLORIDE,) 4 mg tablet Take 1 Tab by mouth every eight (8) hours as needed for Nausea. 12 Tab 0    escitalopram oxalate (LEXAPRO) 10 mg tablet Take 1 Tab by mouth daily. 30 Tab 0    dapagliflozin (FARXIGA) 10 mg tab Take  by mouth daily. Indications: type 2 diabetes mellitus      amLODIPine (NORVASC) 2.5 mg tablet Take 2.5 mg by mouth nightly.  bisoprolol-hydrochlorothiazide (ZIAC) 2.5-6.25 mg per tablet Take 1 Tab by mouth daily.  atorvastatin (LIPITOR) 20 mg tablet Take 40 mg by mouth daily.  omeprazole (PRILOSEC) 10 mg capsule Take 10 mg by mouth daily.  albuterol (PROVENTIL HFA, VENTOLIN HFA, PROAIR HFA) 90 mcg/actuation inhaler Take 1 Puff by inhalation every six (6) hours as needed for Wheezing.  metFORMIN (GLUMETZA) 500 mg TG24 24 hour tablet Take 500 mg by mouth two (2) times a day.        Allergies   Allergen Reactions    Macrodantin [Nitrofurantoin Macrocrystalline] Itching    Tape [Adhesive] Other (comments)     Burned skin when had wound vac     Past Medical History:   Diagnosis Date    Abdominal adhesions     Anemia     Arthritis     all over the body    Asthma     Asthma     Diabetes ([address]     Diabetes mellitus     Dyskinesia     bilateral    Essential hypertension     GERD (gastroesophageal reflux disease)     Hypertension     Menopause     Stool color black      Past Surgical History:   Procedure Laterality Date    HX CHOLECYSTECTOMY  6/28/10    HX COLONOSCOPY      HX ENDOSCOPY      HX HERNIA REPAIR      HX HYSTERECTOMY      Fibroids    HX KNEE REPLACEMENT      HX OOPHORECTOMY      TISSUE LOCALIZATION-EXCISION       Family History   Problem Relation Age of Onset    Hypertension Other      parent    Breast Cancer Other 21    Heart Disease Father     Hypertension Father     Diabetes Father     Diabetes Mother     Hypertension Other      sibling    Diabetes Other      parent    Diabetes Sister     Kidney Disease Brother     Diabetes Brother      Social History   Substance Use Topics    Smoking status: Never Smoker    Smokeless tobacco: Never Used    Alcohol use No       ROS   General ROS: negative for - chills or fever  Psychological ROS: negative for - anxiety or depression  Ophthalmic ROS: positive for - uses glasses  ENT ROS: negative for - headaches  Endocrine ROS: positive for - polydipsia/polyuria  Respiratory ROS: no cough, shortness of breath, or wheezing  Cardiovascular ROS: no chest pain or dyspnea on exertion  Genito-Urinary ROS: no dysuria, trouble voiding, or hematuria  Neurological ROS: negative for - numbness/tingling or weakness  Dermatological ROS: negative for - rash    All other systems reviewed and are negative. Objective:  Vitals:    05/12/17 1544   BP: 131/81   Pulse: 78   Resp: 16   Temp: 95.3 °F (35.2 °C)   TempSrc: Oral   SpO2: 100%   Weight: 154 lb 3.2 oz (69.9 kg)   Height: 5' 4\" (1.626 m)   PainSc:   7   PainLoc: Knee     alert, well appearing, and in no distress, oriented to person, place, and time and normal appearing weight  Mental status - normal mood, behavior, speech, dress, motor activity, and thought processes  Musculoskeletal - abnormal exam of left upper leg with TTP posterior   Ext - varicose vein in right popliteal fossa  Skin - rashes; no bruising    TESTS  Us ext left - IMPRESSION: Benign-appearing mass isoechoic to the normal subcutaneous fat  centered between the proximal hamstring musculature, most in keeping with a  Lipoma. CT abd/pelv  IMPRESSION:     1. Improved postsurgical changes.   2. Relatively stable left pelvic sidewall cystic collection, possibly ovarian  versus peritoneal inclusion cyst.  3. Hepatic steatosis. 4. Prior cholecystectomy.       Assessment/Plan:    1. Varicose veins of leg with pain, right  Discussed findings with pt  2. Lipoma of left lower extremity  appt scheduled for excision    3. Essential hypertension  Goal <140/90; controlled with current medication; no side effects; continue treatment    4. Type 2 diabetes mellitus with hyperglycemia, without long-term current use of insulin (HCC)  Goal hgb a1c <7; well controlled with current management; continue treatment  - dapagliflozin (FARXIGA) 10 mg tab; Take 1 Tab by mouth daily. Indications: type 2 diabetes mellitus  Dispense: 90 Tab; Refill: 3    5. Chronic abdominal pain  Negative findings on imaging    Lab review: labs reviewed, I note that hemogram normal, comprehensive metabolic panel mildly abnormal but acceptable      I have discussed the diagnosis with the patient and the intended plan as seen in the above orders. The patient has received an after-visit summary and questions were answered concerning future plans. I have discussed medication side effects and warnings with the patient as well. I have reviewed the plan of care with the patient, accepted their input and they are in agreement with the treatment goals. Follow-up Disposition:  Return in about 2 weeks (around 5/26/2017) for procedure. Pino Baker

## 2017-05-15 ENCOUNTER — HOSPITAL ENCOUNTER (EMERGENCY)
Age: 57
Discharge: HOME | End: 2017-05-15
Attending: EMERGENCY MEDICINE
Payer: COMMERCIAL

## 2017-05-15 ENCOUNTER — APPOINTMENT (OUTPATIENT)
Dept: GENERAL RADIOLOGY | Age: 57
End: 2017-05-15
Attending: EMERGENCY MEDICINE
Payer: COMMERCIAL

## 2017-05-15 VITALS
RESPIRATION RATE: 16 BRPM | BODY MASS INDEX: 25.44 KG/M2 | OXYGEN SATURATION: 100 % | HEART RATE: 84 BPM | HEIGHT: 64 IN | SYSTOLIC BLOOD PRESSURE: 122 MMHG | WEIGHT: 149 LBS | DIASTOLIC BLOOD PRESSURE: 85 MMHG | TEMPERATURE: 97.8 F

## 2017-05-15 DIAGNOSIS — M25.551 PAIN OF RIGHT HIP JOINT: Primary | ICD-10-CM

## 2017-05-15 PROCEDURE — 99283 EMERGENCY DEPT VISIT LOW MDM: CPT

## 2017-05-15 PROCEDURE — 74011250637 HC RX REV CODE- 250/637: Performed by: EMERGENCY MEDICINE

## 2017-05-15 PROCEDURE — 73502 X-RAY EXAM HIP UNI 2-3 VIEWS: CPT

## 2017-05-15 RX ORDER — HYDROCODONE BITARTRATE AND ACETAMINOPHEN 5; 325 MG/1; MG/1
1 TABLET ORAL
Qty: 6 TAB | Refills: 0 | Status: SHIPPED | OUTPATIENT
Start: 2017-05-15 | End: 2017-05-26 | Stop reason: SDUPTHER

## 2017-05-15 RX ORDER — HYDROCODONE BITARTRATE AND ACETAMINOPHEN 5; 325 MG/1; MG/1
1 TABLET ORAL
Status: COMPLETED | OUTPATIENT
Start: 2017-05-15 | End: 2017-05-15

## 2017-05-15 RX ORDER — CYCLOBENZAPRINE HCL 10 MG
10 TABLET ORAL
Status: COMPLETED | OUTPATIENT
Start: 2017-05-15 | End: 2017-05-15

## 2017-05-15 RX ADMIN — HYDROCODONE BITARTRATE AND ACETAMINOPHEN 1 TABLET: 5; 325 TABLET ORAL at 08:29

## 2017-05-15 RX ADMIN — CYCLOBENZAPRINE HYDROCHLORIDE 10 MG: 10 TABLET, FILM COATED ORAL at 08:29

## 2017-05-15 NOTE — LETTER
[address] [facility] EMERGENCY DEPT 
[facility] [address] [phone] 
[phone] Work/School Note Date: 5/15/2017 To Whom It May concern: 
 
Vic Pratt was seen and treated today in the emergency room by the following provider(s): 
Attending Provider: Samina Dickinson MD.   
 
Vic Pratt may return to work on Tuesday May 16.  
 
Sincerely, 
 
 
 
 
Samina Dickinson MD

## 2017-05-15 NOTE — DISCHARGE INSTRUCTIONS
Hip Pain: Care Instructions  Your Care Instructions  Hip pain may be caused by many things, including overuse, a fall, or a twisting movement. Another cause of hip pain is arthritis. Your pain may increase when you stand up, walk, or squat. The pain may come and go or may be constant. Home treatment can help relieve hip pain, swelling, and stiffness. If your pain is ongoing, you may need more tests and treatment. Follow-up care is a key part of your treatment and safety. Be sure to make and go to all appointments, and call your doctor if you are having problems. Its also a good idea to know your test results and keep a list of the medicines you take. How can you care for yourself at home? · Take pain medicines exactly as directed. ¨ If the doctor gave you a prescription medicine for pain, take it as prescribed. ¨ If you are not taking a prescription pain medicine, ask your doctor if you can take an over-the-counter medicine. · Rest and protect your hip. Take a break from any activity, including standing or walking, that may cause pain. · Put ice or a cold pack against your hip for 10 to 20 minutes at a time. Try to do this every 1 to 2 hours for the next 3 days (when you are awake) or until the swelling goes down. Put a thin cloth between the ice and your skin. · Sleep on your healthy side with a pillow between your knees, or sleep on your back with pillows under your knees. · If there is no swelling, you can put moist heat, a heating pad, or a warm cloth on your hip. Do gentle stretching exercises to help keep your hip flexible. · Learn how to prevent falls. Have your vision and hearing checked regularly. Wear slippers or shoes with a nonskid sole. · Stay at a healthy weight. · Wear comfortable shoes. When should you call for help? Call 911 anytime you think you may need emergency care. For example, call if:  · You have sudden chest pain and shortness of breath, or you cough up blood.   · You are not able to stand or walk or bear weight. · Your buttocks, legs, or feet feel numb or tingly. · Your leg or foot is cool or pale or changes color. · You have severe pain. Call your doctor now or seek immediate medical care if:  · You have signs of infection, such as:  ¨ Increased pain, swelling, warmth, or redness in the hip area. ¨ Red streaks leading from the hip area. ¨ Pus draining from the hip area. ¨ A fever. · You have signs of a blood clot, such as:  ¨ Pain in your calf, back of the knee, thigh, or groin. ¨ Redness and swelling in your leg or groin. · You are not able to bend, straighten, or move your leg normally. · You have trouble urinating or having bowel movements. Watch closely for changes in your health, and be sure to contact your doctor if:  · You do not get better as expected. Where can you learn more? Go to http://betzy-didi.info/. Enter V578 in the search box to learn more about \"Hip Pain: Care Instructions. \"  Current as of: May 27, 2016  Content Version: 11.2  © 3186-9444 [employer]. Care instructions adapted under license by [employer] (which disclaims liability or warranty for this information). If you have questions about a medical condition or this instruction, always ask your healthcare professional. [address] any warranty or liability for your use of this information. SongAfter Activation    Thank you for requesting access to [employer]. Please follow the instructions below to securely access and download your online medical record. [employer] allows you to send messages to your doctor, view your test results, renew your prescriptions, schedule appointments, and more. How Do I Sign Up? 1. In your internet browser, go to [email or web address]  2. Click on the First Time User? Click Here link in the Sign In box. You will be redirect to the New Member Sign Up page.   3. Enter your [employer] Access Code exactly as it appears below. You will not need to use this code after youve completed the sign-up process. If you do not sign up before the expiration date, you must request a new code. [employer] Access Code: [email or web address]  Expires: 2017 10:48 AM (This is the date your [employer] access code will )    4. Enter the last four digits of your Social Security Number (xxxx) and Date of Birth (mm/dd/yyyy) as indicated and click Submit. You will be taken to the next sign-up page. 5. Create a [employer] ID. This will be your [employer] login ID and cannot be changed, so think of one that is secure and easy to remember. 6. Create a [employer] password. You can change your password at any time. 7. Enter your Password Reset Question and Answer. This can be used at a later time if you forget your password. 8. Enter your e-mail address. You will receive e-mail notification when new information is available in [address] 9. Click Sign Up. You can now view and download portions of your medical record. 10. Click the Download Summary menu link to download a portable copy of your medical information. Additional Information    If you have questions, please visit the Frequently Asked Questions section of the [employer] website at [email or web address] Remember, [employer] is NOT to be used for urgent needs. For medical emergencies, dial 911.

## 2017-05-15 NOTE — ED PROVIDER NOTES
Patient is a 62 y.o. female presenting with leg pain and hip pain. Leg Pain    This is a new problem. The current episode started yesterday. The problem occurs constantly. The problem has not changed since onset. The pain is present in the right hip. The quality of the pain is described as constant. The pain is at a severity of 10/10. The pain is moderate. Associated symptoms include limited range of motion. Pertinent negatives include no numbness, no stiffness, no tingling, no itching and no back pain. She has tried heat for the symptoms. There has been a history of trauma (fell yesterday striking trigh thip against table - slipped, no LOC). Hip Pain    This is a new problem. The current episode started yesterday. The problem occurs constantly. The problem has not changed since onset. The pain is present in the right hip. The quality of the pain is described as constant. The pain is at a severity of 10/10. The pain is moderate. Associated symptoms include limited range of motion. Pertinent negatives include no numbness, no stiffness, no tingling, no itching and no back pain.         Past Medical History:   Diagnosis Date    Abdominal adhesions     Anemia     Arthritis     all over the body    Asthma     Asthma     Diabetes ([address]     Diabetes mellitus     Dyskinesia     bilateral    Essential hypertension     GERD (gastroesophageal reflux disease)     Hypertension     Menopause     Stool color black        Past Surgical History:   Procedure Laterality Date    HX CHOLECYSTECTOMY  6/28/10    HX COLONOSCOPY      HX ENDOSCOPY      HX HERNIA REPAIR      HX HYSTERECTOMY      Fibroids    HX KNEE REPLACEMENT      HX OOPHORECTOMY      TISSUE LOCALIZATION-EXCISION           Family History:   Problem Relation Age of Onset    Hypertension Other      parent    Breast Cancer Other 21    Heart Disease Father     Hypertension Father     Diabetes Father     Diabetes Mother     Hypertension Other sibling    Diabetes Other      parent    Diabetes Sister     Kidney Disease Brother     Diabetes Brother        Social History     Social History    Marital status:      Spouse name: N/A    Number of children: N/A    Years of education: N/A     Occupational History    Not on file. Social History Main Topics    Smoking status: Never Smoker    Smokeless tobacco: Never Used    Alcohol use No    Drug use: No    Sexual activity: Yes     Partners: Male     Birth control/ protection: None     Other Topics Concern    Not on file     Social History Narrative         ALLERGIES: Macrodantin [nitrofurantoin macrocrystalline] and Tape [adhesive]    Review of Systems   Constitutional: Negative for chills and fever. Respiratory: Negative for shortness of breath. Cardiovascular: Negative for chest pain. Gastrointestinal: Negative for abdominal pain. Musculoskeletal: Positive for gait problem and myalgias. Negative for back pain, neck stiffness and stiffness. Skin: Negative for itching and wound. Neurological: Negative for dizziness, tingling, syncope, weakness and numbness. All other systems reviewed and are negative. Vitals:    05/15/17 0821   BP: 122/85   Pulse: 84   Resp: 16   Temp: 97.8 °F (36.6 °C)   SpO2: 100%   Weight: 67.6 kg (149 lb)   Height: 5' 4\" (1.626 m)            Physical Exam   Constitutional: She appears well-developed and well-nourished. No distress. HENT:   Head: Normocephalic and atraumatic. Cardiovascular: Normal rate and regular rhythm. Pulmonary/Chest: Effort normal and breath sounds normal. No stridor. No respiratory distress. Musculoskeletal:        Right hip: She exhibits decreased range of motion and tenderness. She exhibits normal strength, no bony tenderness, no swelling, no crepitus, no deformity and no laceration. Able to bear weight and walk   Neurological: She is alert. Skin: She is not diaphoretic. Nursing note and vitals reviewed. MDM  Number of Diagnoses or Management Options  Pain of right hip joint:   Diagnosis management comments: X-ray neg - improved with meds - will d/c with crutches    ED Course       Procedures

## 2017-05-15 NOTE — ED TRIAGE NOTES
Slipped yesterday on the kitchen floor (spilled pan juices on the floor), no LOC, pain to R hip and R leg

## 2017-05-18 NOTE — PROGRESS NOTES
0941 am meds given    1300 patient ambulating in hallway    1700 patient in bed watching tv, mother present at bedside, toradol given, no distress noted    1818 patient tolerating diet well, no c/o N&V during shift, percocets and benadry given     1933 Bedside and Verbal shift change report given to Koby Cruz (oncoming nurse) by Saúl Tucker RN (offgoing nurse). Report included the following information SBAR, Kardex and MAR. Pt roomed immediately to green 37. Agree with triage note. Life skills notified. Pt in room.

## 2017-05-26 ENCOUNTER — OFFICE VISIT (OUTPATIENT)
Dept: FAMILY MEDICINE CLINIC | Age: 57
End: 2017-05-26

## 2017-05-26 VITALS
OXYGEN SATURATION: 100 % | HEIGHT: 64 IN | BODY MASS INDEX: 25.61 KG/M2 | WEIGHT: 150 LBS | DIASTOLIC BLOOD PRESSURE: 75 MMHG | RESPIRATION RATE: 16 BRPM | TEMPERATURE: 96.4 F | SYSTOLIC BLOOD PRESSURE: 123 MMHG | HEART RATE: 100 BPM

## 2017-05-26 DIAGNOSIS — E11.65 TYPE 2 DIABETES MELLITUS WITH HYPERGLYCEMIA, WITHOUT LONG-TERM CURRENT USE OF INSULIN (HCC): ICD-10-CM

## 2017-05-26 DIAGNOSIS — D17.24 LIPOMA OF LEFT LOWER EXTREMITY: Primary | ICD-10-CM

## 2017-05-26 RX ORDER — HYDROCODONE BITARTRATE AND ACETAMINOPHEN 5; 325 MG/1; MG/1
1 TABLET ORAL
Qty: 6 TAB | Refills: 0 | Status: SHIPPED | OUTPATIENT
Start: 2017-05-26 | End: 2017-06-02 | Stop reason: SDUPTHER

## 2017-05-26 NOTE — MR AVS SNAPSHOT
Visit Information Date & Time Provider Department Dept. Phone Encounter #  
 5/26/2017  3:00 PM Karen Dickerson [facility] [id number] Follow-up Instructions Return in about 1 week (around 6/2/2017) for suture removal. Upcoming Health Maintenance Date Due Hepatitis C Screening 1960 EYE EXAM RETINAL OR DILATED Q1 2/4/1970 Pneumococcal 19-64 Medium Risk (1 of 1 - PPSV23) 2/4/1979 DTaP/Tdap/Td series (1 - Tdap) 2/4/1981 PAP AKA CERVICAL CYTOLOGY 2/4/1981 INFLUENZA AGE 9 TO ADULT 8/1/2017 HEMOGLOBIN A1C Q6M 9/27/2017 FOOT EXAM Q1 3/27/2018 MICROALBUMIN Q1 3/27/2018 LIPID PANEL Q1 3/27/2018 BREAST CANCER SCRN MAMMOGRAM 1/6/2019 COLONOSCOPY 3/3/2026 Allergies as of 5/26/2017  Review Complete On: 5/15/2017 By: María Perera RN Severity Noted Reaction Type Reactions Macrodantin [Nitrofurantoin Macrocrystalline]  01/31/2012    Itching Tape [Adhesive]  09/30/2014    Other (comments) Burned skin when had wound vac Current Immunizations  Reviewed on 2/22/2017 Name Date Influenza Vaccine 11/15/2016 Not reviewed this visit You Were Diagnosed With   
  
 Codes Comments Lipoma of left lower extremity    -  Primary ICD-10-CM: D17.24 ICD-9-CM: 214.1 Vitals BP Pulse Temp Resp Height(growth percentile) Weight(growth percentile) 123/75 (BP 1 Location: Right arm, BP Patient Position: Sitting) 100 96.4 °F (35.8 °C) (Oral) 16 5' 4\" (1.626 m) 150 lb (68 kg) SpO2 BMI OB Status Smoking Status 100% 25.75 kg/m2 Hysterectomy Never Smoker BMI and BSA Data Body Mass Index Body Surface Area 25.75 kg/m 2 1.75 m 2 Preferred Pharmacy Pharmacy Name Phone [address] [address]  [facility] [phone] Your Updated Medication List  
  
   
This list is accurate as of: 5/26/17  4:12 PM.  Always use your most recent med list.  
  
  
  
  
 albuterol 90 mcg/actuation inhaler Commonly known as:  PROVENTIL HFA, VENTOLIN HFA, PROAIR HFA Take 1 Puff by inhalation every six (6) hours as needed for Wheezing. amLODIPine 2.5 mg tablet Commonly known as:  Mae Dural Take 2.5 mg by mouth nightly. dapagliflozin 10 mg Tab Commonly known as:  [employer] Take 1 Tab by mouth daily. Indications: type 2 diabetes mellitus  
  
 escitalopram oxalate 10 mg tablet Commonly known as:  Sharee Matar Take 1 Tab by mouth daily. glucose blood VI test strips strip Commonly known as:  ONETOUCH ULTRA TEST Check blood glucose as directed GLUMETZA 500 mg Tg24 24 hour tablet Generic drug:  metFORMIN Take 500 mg by mouth two (2) times a day. HYDROcodone-acetaminophen 5-325 mg per tablet Commonly known as:  Victor Hugo Delco Take 1 Tab by mouth every four (4) hours as needed for Pain. Max Daily Amount: 6 Tabs. LIPITOR 20 mg tablet Generic drug:  atorvastatin Take 40 mg by mouth daily. omeprazole 10 mg capsule Commonly known as:  PRILOSEC Take 10 mg by mouth daily. ondansetron 4 mg disintegrating tablet Commonly known as:  ZOFRAN ODT Take 1 Tab by mouth every eight (8) hours as needed for Nausea. ondansetron hcl 4 mg tablet Commonly known as:  ZOFRAN (AS HYDROCHLORIDE) Take 1 Tab by mouth every eight (8) hours as needed for Nausea. ZIAC 2.5-6.25 mg per tablet Generic drug:  bisoprolol-hydroCHLOROthiazide Take 1 Tab by mouth daily. Prescriptions Printed Refills HYDROcodone-acetaminophen (NORCO) 5-325 mg per tablet 0 Sig: Take 1 Tab by mouth every four (4) hours as needed for Pain. Max Daily Amount: 6 Tabs. Class: Print Route: Oral  
  
We Performed the Following EXC SKIN BENIG 2.1-3CM TRUNK,ARM,LEG [42757 CPT(R)] Follow-up Instructions Return in about 1 week (around 6/2/2017) for suture removal.  
  
  
Patient Instructions Skin Lesion Removal: What to Expect at Home Your Recovery After your procedure, you should not have much pain. But some soreness, swelling, or bruising is normal. Your doctor may recommend over-the-counter medicines to help with any discomfort. Most people can return to their normal routine the same day of their procedure. How quickly your wound heals depends on the size of your wound and the type of procedure you had. Most wounds take 1 to 3 weeks to heal. If you had laser surgery, your skin may change color and then slowly return to its normal color. You may need only a bandage, or you may need stitches. If you had stitches, your doctor will probably remove them 5 to 14 days later. If you have the type of stitches that dissolve, they do not have to be removed. They will disappear on their own. This care sheet gives you a general idea about how long it will take for you to recover. But each person recovers at a different pace. Follow the steps below to get better as quickly as possible. How can you care for yourself at home? Activity · For the first few days, try not to bump or knock your wound. · Depending on where your wound is, you may need to avoid strenuous exercise for 2 weeks after the procedure or until your doctor says it is okay. · If you have had a lesion removed from your face, do not use makeup near your wound until you have your stitches taken out. · Ask your doctor when it is okay to shower, bathe, or swim. Medicines · Your doctor will tell you if and when you can restart your medicines. He or she will also give you instructions about taking any new medicines. · If you take blood thinners, such as warfarin (Coumadin), clopidogrel (Plavix), or aspirin, be sure to talk to your doctor. He or she will tell you if and when to start taking those medicines again. Make sure that you understand exactly what your doctor wants you to do. · Be safe with medicines. Take pain medicines exactly as directed. ¨ If the doctor gave you a prescription medicine for pain, take it as prescribed. ¨ If you are not taking a prescription pain medicine, ask your doctor if you can take an over-the-counter medicine. Wound care · If your doctor told you how to care for your incision, follow your doctor's instructions. If you did not get instructions, follow this general advice: ¨ Keep the wound bandaged and dry for the first day. ¨ After the first 24 to 48 hours, wash around the wound with clean water 2 times a day. Don't use hydrogen peroxide or alcohol, which can slow healing. ¨ You may cover the wound with a thin layer of petroleum jelly, such as Vaseline, and a nonstick bandage. ¨ Apply more petroleum jelly and replace the bandage as needed. · If you have stitches, you may get other instructions. · If a scab forms, do not pull it off. Let it fall off on its own. Wounds heal faster if no scab forms. Washing the area every day and using petroleum jelly will help prevent a scab from forming. · If the wound bleeds, put direct pressure on it with a clean cloth until the bleeding stops. · If you had a growth \"frozen\" off, you may get a blister. Do not break it. Let it dry up on its own. It is common for the blister to fill with blood. You do not need to do anything about this, but if it becomes too painful, call your doctor. · Avoid the sun until your stitches are removed. Follow-up care is a key part of your treatment and safety. Be sure to make and go to all appointments, and call your doctor if you are having problems. It's also a good idea to know your test results and keep a list of the medicines you take. When should you call for help? Call 911 anytime you think you may need emergency care. For example, call if: 
· You passed out (lost consciousness). · You have severe trouble breathing. · You have sudden chest pain and shortness of breath, or you cough up blood. Call your doctor now or seek immediate medical care if: 
· You have signs of infection, such as: 
¨ Increased pain, swelling, warmth, or redness. ¨ Red streaks leading from the wound. ¨ Pus draining from the wound. ¨ A fever. · You have pain that does not get better after you take pain medicine. · You have loose stitches. Watch closely for changes in your health, and be sure to contact your doctor if you have any problems. Where can you learn more? Go to http://betzy-didi.info/. Enter Q228 in the search box to learn more about \"Skin Lesion Removal: What to Expect at Home. \" Current as of: October 13, 2016 Content Version: 11.2 © 2392-7257 [employer]. Care instructions adapted under license by [employer] (which disclaims liability or warranty for this information). If you have questions about a medical condition or this instruction, always ask your healthcare professional. [address] any warranty or liability for your use of this information. Introducing [facility]! Remi Pita introduces MyTinks patient portal. Now you can access parts of your medical record, email your doctor's office, and request medication refills online. 1. In your internet browser, go to [email or web address]. [email or web address] 2. Click on the First Time User? Click Here link in the Sign In box. You will see the New Member Sign Up page. 3. Enter your MyTinks Access Code exactly as it appears below. You will not need to use this code after youve completed the sign-up process. If you do not sign up before the expiration date, you must request a new code. · MyTinks Access Code: [email or web address] Expires: 7/23/2017 10:48 AM 
 
4. Enter the last four digits of your Social Security Number (xxxx) and Date of Birth (mm/dd/yyyy) as indicated and click Submit.  You will be taken to the next sign-up page. 5. Create a Advanced Telemetry ID. This will be your Advanced Telemetry login ID and cannot be changed, so think of one that is secure and easy to remember. 6. Create a Advanced Telemetry password. You can change your password at any time. 7. Enter your Password Reset Question and Answer. This can be used at a later time if you forget your password. 8. Enter your e-mail address. You will receive e-mail notification when new information is available in [address] 9. Click Sign Up. You can now view and download portions of your medical record. 10. Click the Download Summary menu link to download a portable copy of your medical information. If you have questions, please visit the Frequently Asked Questions section of the Advanced Telemetry website. Remember, Advanced Telemetry is NOT to be used for urgent needs. For medical emergencies, dial 911. Now available from your iPhone and Android! Please provide this summary of care documentation to your next provider. Your primary care clinician is listed as Tanya Morley. If you have any questions after today's visit, please call [phone].

## 2017-05-26 NOTE — PROGRESS NOTES
Wade aP is a 62 y.o.  female and presents with    Chief Complaint   Patient presents with    Procedure           Subjective:  Painful mass on left posterior upper leg. Patient Active Problem List   Diagnosis Code    Osteoarthritis of left knee M17.12    Hypertension I10    Hyperlipidemia E78.5    GERD (gastroesophageal reflux disease) K21.9    Asthma J45.909    Type 2 diabetes mellitus ([address] E11.9    SBO (small bowel obstruction) ([address] K56.69    Abdominal pain R10.9    Post-operative pain G89.18     Patient Active Problem List    Diagnosis Date Noted    Abdominal pain 03/14/2017    Post-operative pain 03/14/2017    SBO (small bowel obstruction) ([facility] 02/21/2017    Osteoarthritis of left knee 06/27/2016    Hypertension 06/27/2016    Hyperlipidemia 06/27/2016    GERD (gastroesophageal reflux disease) 06/27/2016    Asthma 06/27/2016    Type 2 diabetes mellitus ([facility] [facility] 06/27/2016     Current Outpatient Prescriptions   Medication Sig Dispense Refill    HYDROcodone-acetaminophen (NORCO) 5-325 mg per tablet Take 1 Tab by mouth every four (4) hours as needed for Pain. Max Daily Amount: 6 Tabs. 6 Tab 0    dapagliflozin (FARXIGA) 10 mg tab Take 1 Tab by mouth daily. Indications: type 2 diabetes mellitus 90 Tab 3    ondansetron (ZOFRAN ODT) 4 mg disintegrating tablet Take 1 Tab by mouth every eight (8) hours as needed for Nausea. 8 Tab 0    glucose blood VI test strips (ONETOUCH ULTRA TEST) strip Check blood glucose as directed 100 Strip 1    ondansetron hcl (ZOFRAN, AS HYDROCHLORIDE,) 4 mg tablet Take 1 Tab by mouth every eight (8) hours as needed for Nausea. 12 Tab 0    escitalopram oxalate (LEXAPRO) 10 mg tablet Take 1 Tab by mouth daily. 30 Tab 0    amLODIPine (NORVASC) 2.5 mg tablet Take 2.5 mg by mouth nightly.  bisoprolol-hydrochlorothiazide (ZIAC) 2.5-6.25 mg per tablet Take 1 Tab by mouth daily.       atorvastatin (LIPITOR) 20 mg tablet Take 40 mg by mouth daily.  omeprazole (PRILOSEC) 10 mg capsule Take 10 mg by mouth daily.  albuterol (PROVENTIL HFA, VENTOLIN HFA, PROAIR HFA) 90 mcg/actuation inhaler Take 1 Puff by inhalation every six (6) hours as needed for Wheezing.  metFORMIN (GLUMETZA) 500 mg TG24 24 hour tablet Take 500 mg by mouth two (2) times a day.        Allergies   Allergen Reactions    Macrodantin [Nitrofurantoin Macrocrystalline] Itching    Tape [Adhesive] Other (comments)     Burned skin when had wound vac     Past Medical History:   Diagnosis Date    Abdominal adhesions     Anemia     Arthritis     all over the body    Asthma     Asthma     Diabetes ([address]     Diabetes mellitus     Dyskinesia     bilateral    Essential hypertension     GERD (gastroesophageal reflux disease)     Hypertension     Menopause     Stool color black      Past Surgical History:   Procedure Laterality Date    HX CHOLECYSTECTOMY  6/28/10    HX COLONOSCOPY      HX ENDOSCOPY      HX HERNIA REPAIR      HX HYSTERECTOMY      Fibroids    HX KNEE REPLACEMENT      HX OOPHORECTOMY      TISSUE LOCALIZATION-EXCISION       Family History   Problem Relation Age of Onset    Hypertension Other      parent    Breast Cancer Other 21    Heart Disease Father     Hypertension Father     Diabetes Father     Diabetes Mother     Hypertension Other      sibling    Diabetes Other      parent    Diabetes Sister     Kidney Disease Brother     Diabetes Brother      Social History   Substance Use Topics    Smoking status: Never Smoker    Smokeless tobacco: Never Used    Alcohol use No       ROS   General ROS: negative for - chills or fever  Psychological ROS: negative for - anxiety or depression  Ophthalmic ROS: positive for - uses glasses  ENT ROS: negative for - headaches  Endocrine ROS: positive for - polydipsia/polyuria  Respiratory ROS: no cough, shortness of breath, or wheezing  Cardiovascular ROS: no chest pain or dyspnea on exertion  Genito-Urinary ROS: no dysuria, trouble voiding, or hematuria  Neurological ROS: negative for - numbness/tingling or weakness  Dermatological ROS: negative for - rash    All other systems reviewed and are negative.       Objective:  Vitals:    05/26/17 1507   BP: 123/75   Pulse: 100   Resp: 16   Temp: 96.4 °F (35.8 °C)   TempSrc: Oral   SpO2: 100%   Weight: 150 lb (68 kg)   Height: 5' 4\" (1.626 m)   PainSc:  10 - Worst pain ever   PainLoc: Leg       alert, well appearing, and in no distress, oriented to person, place, and time and normal appearing weight  Mental status - normal mood, behavior, speech, dress, motor activity, and thought processes  Musculoskeletal - abnormal exam of left upper leg with TTP posterior   Ext - varicose vein in right popliteal fossa  Skin - rashes; no bruising    [facility]  OFFICE PROCEDURE PROGRESS NOTE        Chart reviewed for the following:   Sunday Stoddard MD, have reviewed the History, Physical and updated the Allergic reactions for [address] performed immediately prior to start of procedure:   Sunday Stoddard MD, have performed the following reviews on [address] [place] [address] prior to the start of the procedure:            * Patient was identified by name and date of birth   * Agreement on procedure being performed was verified  * Risks and Benefits explained to the patient  * Procedure site verified and marked as necessary  * Patient was positioned for comfort  * Understanding confirmed and consent was signed and verified     Time: .4:10 PM       Date of procedure: 5/26/2017    Procedure performed by:  Abraham Boyd MD    Provider assisted by: Oj Sheth LPN    Patient assisted by: self    How tolerated by patient: tolerated the procedure well with no complications    Comments: none    Prince Leonardo MD    Location of neoplasm: left posterior upper leg    Diagnosis:  Encounter Diagnosis   Name Primary?  Lipoma of left lower extremity Yes     Procedure:   Orders Placed This Encounter    EXC SKIN BENIG 2.1-3CM TRUNK,ARM,LEG    AMB POC FUNDUS PHOTOGRAPHY WITH INTERP AND REPORT    HYDROcodone-acetaminophen (NORCO) 5-325 mg per tablet        Local anesthesia given: 5 ml of 1% lidocaine with epinephrine    Excision diameter: 20 mm  Depth: 10 mm    Type of closure: simple    Description of closure: simple interrupted    Antibiotic dressing is applied, and wound care instructions provided. Be alert for any signs of cutaneous infection. The procedure was well tolerated without complications. Follow up: return for suture removal in 7 days, the patient may return prn. Assessment/Plan:    1. Lipoma of left lower extremity    - EXC SKIN BENIG 2.1-3CM TRUNK,ARM,LEG  - HYDROcodone-acetaminophen (NORCO) 5-325 mg per tablet; Take 1 Tab by mouth every four (4) hours as needed for Pain. Max Daily Amount: 6 Tabs. Dispense: 6 Tab; Refill: 0  - AMB POC FUNDUS PHOTOGRAPHY WITH INTERP AND REPORT      Lab review: no lab studies available for review at time of visit      I have discussed the diagnosis with the patient and the intended plan as seen in the above orders. The patient has received an after-visit summary and questions were answered concerning future plans. I have discussed medication side effects and warnings with the patient as well. I have reviewed the plan of care with the patient, accepted their input and they are in agreement with the treatment goals.      Follow-up Disposition:  Return in about 1 week (around 6/2/2017) for suture removal.

## 2017-05-26 NOTE — PATIENT INSTRUCTIONS
Skin Lesion Removal: What to Expect at Home  Your Recovery  After your procedure, you should not have much pain. But some soreness, swelling, or bruising is normal. Your doctor may recommend over-the-counter medicines to help with any discomfort. Most people can return to their normal routine the same day of their procedure. How quickly your wound heals depends on the size of your wound and the type of procedure you had. Most wounds take 1 to 3 weeks to heal. If you had laser surgery, your skin may change color and then slowly return to its normal color. You may need only a bandage, or you may need stitches. If you had stitches, your doctor will probably remove them 5 to 14 days later. If you have the type of stitches that dissolve, they do not have to be removed. They will disappear on their own. This care sheet gives you a general idea about how long it will take for you to recover. But each person recovers at a different pace. Follow the steps below to get better as quickly as possible. How can you care for yourself at home? Activity  · For the first few days, try not to bump or knock your wound. · Depending on where your wound is, you may need to avoid strenuous exercise for 2 weeks after the procedure or until your doctor says it is okay. · If you have had a lesion removed from your face, do not use makeup near your wound until you have your stitches taken out. · Ask your doctor when it is okay to shower, bathe, or swim. Medicines  · Your doctor will tell you if and when you can restart your medicines. He or she will also give you instructions about taking any new medicines. · If you take blood thinners, such as warfarin (Coumadin), clopidogrel (Plavix), or aspirin, be sure to talk to your doctor. He or she will tell you if and when to start taking those medicines again. Make sure that you understand exactly what your doctor wants you to do. · Be safe with medicines.  Take pain medicines exactly as directed. ¨ If the doctor gave you a prescription medicine for pain, take it as prescribed. ¨ If you are not taking a prescription pain medicine, ask your doctor if you can take an over-the-counter medicine. Wound care  · If your doctor told you how to care for your incision, follow your doctor's instructions. If you did not get instructions, follow this general advice:  ¨ Keep the wound bandaged and dry for the first day. ¨ After the first 24 to 48 hours, wash around the wound with clean water 2 times a day. Don't use hydrogen peroxide or alcohol, which can slow healing. ¨ You may cover the wound with a thin layer of petroleum jelly, such as Vaseline, and a nonstick bandage. ¨ Apply more petroleum jelly and replace the bandage as needed. · If you have stitches, you may get other instructions. · If a scab forms, do not pull it off. Let it fall off on its own. Wounds heal faster if no scab forms. Washing the area every day and using petroleum jelly will help prevent a scab from forming. · If the wound bleeds, put direct pressure on it with a clean cloth until the bleeding stops. · If you had a growth \"frozen\" off, you may get a blister. Do not break it. Let it dry up on its own. It is common for the blister to fill with blood. You do not need to do anything about this, but if it becomes too painful, call your doctor. · Avoid the sun until your stitches are removed. Follow-up care is a key part of your treatment and safety. Be sure to make and go to all appointments, and call your doctor if you are having problems. It's also a good idea to know your test results and keep a list of the medicines you take. When should you call for help? Call 911 anytime you think you may need emergency care. For example, call if:  · You passed out (lost consciousness). · You have severe trouble breathing. · You have sudden chest pain and shortness of breath, or you cough up blood.   Call your doctor now or seek immediate medical care if:  · You have signs of infection, such as:  ¨ Increased pain, swelling, warmth, or redness. ¨ Red streaks leading from the wound. ¨ Pus draining from the wound. ¨ A fever. · You have pain that does not get better after you take pain medicine. · You have loose stitches. Watch closely for changes in your health, and be sure to contact your doctor if you have any problems. Where can you learn more? Go to http://betzy-didi.info/. Enter Q228 in the search box to learn more about \"Skin Lesion Removal: What to Expect at Home. \"  Current as of: October 13, 2016  Content Version: 11.2  © 4711-1605 [employer], 3yy game platform. Care instructions adapted under license by [employer] (which disclaims liability or warranty for this information). If you have questions about a medical condition or this instruction, always ask your healthcare professional. [address] any warranty or liability for your use of this information.

## 2017-06-01 ENCOUNTER — APPOINTMENT (OUTPATIENT)
Dept: CT IMAGING | Age: 57
End: 2017-06-01
Attending: EMERGENCY MEDICINE
Payer: COMMERCIAL

## 2017-06-01 ENCOUNTER — HOSPITAL ENCOUNTER (EMERGENCY)
Age: 57
Discharge: HOME | End: 2017-06-02
Attending: EMERGENCY MEDICINE
Payer: COMMERCIAL

## 2017-06-01 DIAGNOSIS — N83.202 LEFT OVARIAN CYST: Primary | ICD-10-CM

## 2017-06-01 DIAGNOSIS — R31.9 HEMATURIA: ICD-10-CM

## 2017-06-01 LAB
ALBUMIN SERPL BCP-MCNC: 3.2 G/DL (ref 3.4–5)
ALBUMIN/GLOB SERPL: 0.9 {RATIO} (ref 0.8–1.7)
ALP SERPL-CCNC: 127 U/L (ref 45–117)
ALT SERPL-CCNC: 18 U/L (ref 13–56)
ANION GAP BLD CALC-SCNC: 8 MMOL/L (ref 3–18)
APPEARANCE UR: CLEAR
AST SERPL W P-5'-P-CCNC: 13 U/L (ref 15–37)
BACTERIA URNS QL MICRO: NEGATIVE /HPF
BASOPHILS # BLD AUTO: 0 K/UL (ref 0–0.06)
BASOPHILS # BLD: 0 % (ref 0–2)
BILIRUB SERPL-MCNC: 0.3 MG/DL (ref 0.2–1)
BILIRUB UR QL: NEGATIVE
BUN SERPL-MCNC: 19 MG/DL (ref 7–18)
BUN/CREAT SERPL: 14 (ref 12–20)
CALCIUM SERPL-MCNC: 9.3 MG/DL (ref 8.5–10.1)
CHLORIDE SERPL-SCNC: 107 MMOL/L (ref 100–108)
CK MB CFR SERPL CALC: NORMAL % (ref 0–4)
CK MB SERPL-MCNC: <1 NG/ML (ref 5–25)
CK SERPL-CCNC: 76 U/L (ref 26–192)
CO2 SERPL-SCNC: 30 MMOL/L (ref 21–32)
COLOR UR: YELLOW
CREAT SERPL-MCNC: 1.38 MG/DL (ref 0.6–1.3)
DIFFERENTIAL METHOD BLD: NORMAL
EOSINOPHIL # BLD: 0.1 K/UL (ref 0–0.4)
EOSINOPHIL NFR BLD: 2 % (ref 0–5)
EPITH CASTS URNS QL MICRO: NORMAL /LPF (ref 0–5)
ERYTHROCYTE [DISTWIDTH] IN BLOOD BY AUTOMATED COUNT: 14.1 % (ref 11.6–14.5)
GLOBULIN SER CALC-MCNC: 3.6 G/DL (ref 2–4)
GLUCOSE SERPL-MCNC: 87 MG/DL (ref 74–99)
GLUCOSE UR STRIP.AUTO-MCNC: >1000 MG/DL
HCT VFR BLD AUTO: 37.5 % (ref 35–45)
HGB BLD-MCNC: 12.4 G/DL (ref 12–16)
HGB UR QL STRIP: ABNORMAL
KETONES UR QL STRIP.AUTO: NEGATIVE MG/DL
LEUKOCYTE ESTERASE UR QL STRIP.AUTO: NEGATIVE
LIPASE SERPL-CCNC: 257 U/L (ref 73–393)
LYMPHOCYTES # BLD AUTO: 31 % (ref 21–52)
LYMPHOCYTES # BLD: 2.1 K/UL (ref 0.9–3.6)
MCH RBC QN AUTO: 28.6 PG (ref 24–34)
MCHC RBC AUTO-ENTMCNC: 33.1 G/DL (ref 31–37)
MCV RBC AUTO: 86.6 FL (ref 74–97)
MONOCYTES # BLD: 0.6 K/UL (ref 0.05–1.2)
MONOCYTES NFR BLD AUTO: 8 % (ref 3–10)
NEUTS SEG # BLD: 3.9 K/UL (ref 1.8–8)
NEUTS SEG NFR BLD AUTO: 59 % (ref 40–73)
NITRITE UR QL STRIP.AUTO: NEGATIVE
PH UR STRIP: 7 [PH] (ref 5–8)
PLATELET # BLD AUTO: 268 K/UL (ref 135–420)
PMV BLD AUTO: 10.3 FL (ref 9.2–11.8)
POTASSIUM SERPL-SCNC: 3.6 MMOL/L (ref 3.5–5.5)
PROT SERPL-MCNC: 6.8 G/DL (ref 6.4–8.2)
PROT UR STRIP-MCNC: NEGATIVE MG/DL
RBC # BLD AUTO: 4.33 M/UL (ref 4.2–5.3)
RBC #/AREA URNS HPF: NORMAL /HPF (ref 0–5)
SODIUM SERPL-SCNC: 145 MMOL/L (ref 136–145)
SP GR UR REFRACTOMETRY: 1.02 (ref 1–1.03)
TROPONIN I SERPL-MCNC: <0.02 NG/ML (ref 0–0.04)
UROBILINOGEN UR QL STRIP.AUTO: 1 EU/DL (ref 0.2–1)
WBC # BLD AUTO: 6.7 K/UL (ref 4.6–13.2)
WBC URNS QL MICRO: NORMAL /HPF (ref 0–4)

## 2017-06-01 PROCEDURE — 96374 THER/PROPH/DIAG INJ IV PUSH: CPT

## 2017-06-01 PROCEDURE — 83690 ASSAY OF LIPASE: CPT | Performed by: EMERGENCY MEDICINE

## 2017-06-01 PROCEDURE — 85025 COMPLETE CBC W/AUTO DIFF WBC: CPT | Performed by: EMERGENCY MEDICINE

## 2017-06-01 PROCEDURE — 74176 CT ABD & PELVIS W/O CONTRAST: CPT

## 2017-06-01 PROCEDURE — 80053 COMPREHEN METABOLIC PANEL: CPT | Performed by: EMERGENCY MEDICINE

## 2017-06-01 PROCEDURE — 99284 EMERGENCY DEPT VISIT MOD MDM: CPT

## 2017-06-01 PROCEDURE — 74011250636 HC RX REV CODE- 250/636: Performed by: EMERGENCY MEDICINE

## 2017-06-01 PROCEDURE — 81001 URINALYSIS AUTO W/SCOPE: CPT | Performed by: EMERGENCY MEDICINE

## 2017-06-01 PROCEDURE — 93005 ELECTROCARDIOGRAM TRACING: CPT

## 2017-06-01 PROCEDURE — 82550 ASSAY OF CK (CPK): CPT | Performed by: EMERGENCY MEDICINE

## 2017-06-01 PROCEDURE — 96376 TX/PRO/DX INJ SAME DRUG ADON: CPT

## 2017-06-01 PROCEDURE — 96375 TX/PRO/DX INJ NEW DRUG ADDON: CPT

## 2017-06-01 RX ORDER — ONDANSETRON 2 MG/ML
4 INJECTION INTRAMUSCULAR; INTRAVENOUS
Status: COMPLETED | OUTPATIENT
Start: 2017-06-01 | End: 2017-06-01

## 2017-06-01 RX ORDER — MORPHINE SULFATE 4 MG/ML
4 INJECTION, SOLUTION INTRAMUSCULAR; INTRAVENOUS
Status: COMPLETED | OUTPATIENT
Start: 2017-06-01 | End: 2017-06-01

## 2017-06-01 RX ADMIN — ONDANSETRON 4 MG: 2 INJECTION INTRAMUSCULAR; INTRAVENOUS at 23:38

## 2017-06-01 RX ADMIN — Medication 4 MG: at 23:38

## 2017-06-02 ENCOUNTER — OFFICE VISIT (OUTPATIENT)
Dept: FAMILY MEDICINE CLINIC | Age: 57
End: 2017-06-02

## 2017-06-02 VITALS
BODY MASS INDEX: 25.61 KG/M2 | RESPIRATION RATE: 15 BRPM | TEMPERATURE: 97.8 F | SYSTOLIC BLOOD PRESSURE: 124 MMHG | DIASTOLIC BLOOD PRESSURE: 77 MMHG | HEART RATE: 82 BPM | WEIGHT: 150 LBS | HEIGHT: 64 IN | OXYGEN SATURATION: 100 %

## 2017-06-02 VITALS
WEIGHT: 156 LBS | TEMPERATURE: 97.5 F | OXYGEN SATURATION: 99 % | HEART RATE: 80 BPM | DIASTOLIC BLOOD PRESSURE: 70 MMHG | HEIGHT: 64 IN | BODY MASS INDEX: 26.63 KG/M2 | RESPIRATION RATE: 14 BRPM | SYSTOLIC BLOOD PRESSURE: 115 MMHG

## 2017-06-02 DIAGNOSIS — Z48.02 VISIT FOR SUTURE REMOVAL: ICD-10-CM

## 2017-06-02 DIAGNOSIS — K66.8 PERITONEAL CYST: Primary | ICD-10-CM

## 2017-06-02 DIAGNOSIS — R31.9 HEMATURIA: ICD-10-CM

## 2017-06-02 LAB
ATRIAL RATE: 90 BPM
CALCULATED P AXIS, ECG09: 60 DEGREES
CALCULATED R AXIS, ECG10: 56 DEGREES
CALCULATED T AXIS, ECG11: 79 DEGREES
DIAGNOSIS, 93000: NORMAL
P-R INTERVAL, ECG05: 138 MS
Q-T INTERVAL, ECG07: 354 MS
QRS DURATION, ECG06: 70 MS
QTC CALCULATION (BEZET), ECG08: 433 MS
VENTRICULAR RATE, ECG03: 90 BPM

## 2017-06-02 PROCEDURE — 74011250636 HC RX REV CODE- 250/636

## 2017-06-02 PROCEDURE — 74011250636 HC RX REV CODE- 250/636: Performed by: EMERGENCY MEDICINE

## 2017-06-02 RX ORDER — KETOROLAC TROMETHAMINE 30 MG/ML
15 INJECTION, SOLUTION INTRAMUSCULAR; INTRAVENOUS
Status: COMPLETED | OUTPATIENT
Start: 2017-06-02 | End: 2017-06-02

## 2017-06-02 RX ORDER — MORPHINE SULFATE 4 MG/ML
INJECTION, SOLUTION INTRAMUSCULAR; INTRAVENOUS
Status: DISCONTINUED
Start: 2017-06-02 | End: 2017-06-02 | Stop reason: HOSPADM

## 2017-06-02 RX ORDER — HYDROCODONE BITARTRATE AND ACETAMINOPHEN 5; 325 MG/1; MG/1
1 TABLET ORAL
Qty: 12 TAB | Refills: 0 | Status: SHIPPED | OUTPATIENT
Start: 2017-06-02 | End: 2017-06-20

## 2017-06-02 RX ORDER — KETOROLAC TROMETHAMINE 30 MG/ML
INJECTION, SOLUTION INTRAMUSCULAR; INTRAVENOUS
Status: DISCONTINUED
Start: 2017-06-02 | End: 2017-06-02 | Stop reason: HOSPADM

## 2017-06-02 RX ORDER — ONDANSETRON 4 MG/1
4 TABLET, FILM COATED ORAL
Qty: 12 TAB | Refills: 0 | Status: SHIPPED | OUTPATIENT
Start: 2017-06-02 | End: 2017-08-01 | Stop reason: SDUPTHER

## 2017-06-02 RX ORDER — MORPHINE SULFATE 4 MG/ML
4 INJECTION, SOLUTION INTRAMUSCULAR; INTRAVENOUS
Status: COMPLETED | OUTPATIENT
Start: 2017-06-02 | End: 2017-06-02

## 2017-06-02 RX ORDER — KETOROLAC TROMETHAMINE 15 MG/ML
INJECTION, SOLUTION INTRAMUSCULAR; INTRAVENOUS
Status: DISCONTINUED
Start: 2017-06-02 | End: 2017-06-02 | Stop reason: HOSPADM

## 2017-06-02 RX ADMIN — KETOROLAC TROMETHAMINE 15 MG: 30 INJECTION, SOLUTION INTRAMUSCULAR at 01:20

## 2017-06-02 RX ADMIN — MORPHINE SULFATE 4 MG: 4 INJECTION, SOLUTION INTRAMUSCULAR; INTRAVENOUS at 01:16

## 2017-06-02 NOTE — PROGRESS NOTES
Chandrika Talbert is a 62 y.o.  female and presents with    Chief Complaint   Patient presents with    Abdominal Pain     wants to see gyn in order to figure out with ovarian cyst got told about  over at ER yesterday, however patient is under impression she had it removed durin ghysterectomy. Pt is also wants to see urology for problems with bladder(cyst)     Surgical Follow-up     stitches removal upper left posterior leg.  Medication Refill     out of percocet, if possible refill for pain management.  Blood in Urine     Subjective:  Ms. Lui Díaz presents for ER f/u. She was seen yesterday complaining of left sided, sharp flank pain starting yesterday. Pt reports that she is also experiencing light-headedness, diaphoresis, extreme thirst, increased urinary frequency, and left sided abd pain. Pt states she has had a lot of abdominal surgeries including her appendix removed during which they nicked her bowel causing her to go into septic shock and be hospitalized for 1 month. Pt has stiches on the back of her left thigh from having two little \"tumors\" removed; she states it is still very painful. She has an unknown history of kidney stones. Pt denies dysuria, hematuria, vaginal bleeding, or any other symptoms at this time. Cardiovascular Review:  The patient has diabetes, hypertension and hyperlipidemia. Diet and Lifestyle: generally follows a low fat low cholesterol diet, generally follows a low sodium diet, follows a diabetic diet regularly, exercises sporadically, nonsmoker  Home BP Monitoring: is not measured at home.   Pertinent ROS: taking medications as instructed, no medication side effects noted, no TIA's, no chest pain on exertion, no dyspnea on exertion, no swelling of ankles.      Patient Active Problem List   Diagnosis Code    Osteoarthritis of left knee M17.12    Hypertension I10    Hyperlipidemia E78.5    GERD (gastroesophageal reflux disease) K21.9    Asthma J45.909    Type 2 diabetes mellitus (HCC) E11.9    SBO (small bowel obstruction) ([facility] [address] K56.69    Abdominal pain R10.9    Post-operative pain G89.18     Patient Active Problem List    Diagnosis Date Noted    Abdominal pain 03/14/2017    Post-operative pain 03/14/2017    SBO (small bowel obstruction) ([facility] [address] 02/21/2017    Osteoarthritis of left knee 06/27/2016    Hypertension 06/27/2016    Hyperlipidemia 06/27/2016    GERD (gastroesophageal reflux disease) 06/27/2016    Asthma 06/27/2016    Type 2 diabetes mellitus (HCC) 06/27/2016     Current Outpatient Prescriptions   Medication Sig Dispense Refill    ondansetron hcl (ZOFRAN, AS HYDROCHLORIDE,) 4 mg tablet Take 1 Tab by mouth every eight (8) hours as needed for Nausea. 12 Tab 0    HYDROcodone-acetaminophen (NORCO) 5-325 mg per tablet Take 1 Tab by mouth every four (4) hours as needed for Pain. Max Daily Amount: 6 Tabs. 6 Tab 0    dapagliflozin (FARXIGA) 10 mg tab Take 1 Tab by mouth daily. Indications: type 2 diabetes mellitus 90 Tab 3    ondansetron (ZOFRAN ODT) 4 mg disintegrating tablet Take 1 Tab by mouth every eight (8) hours as needed for Nausea. 8 Tab 0    glucose blood VI test strips (ONETOUCH ULTRA TEST) strip Check blood glucose as directed 100 Strip 1    escitalopram oxalate (LEXAPRO) 10 mg tablet Take 1 Tab by mouth daily. 30 Tab 0    amLODIPine (NORVASC) 2.5 mg tablet Take 2.5 mg by mouth nightly.  bisoprolol-hydrochlorothiazide (ZIAC) 2.5-6.25 mg per tablet Take 1 Tab by mouth daily.  atorvastatin (LIPITOR) 20 mg tablet Take 40 mg by mouth daily.  omeprazole (PRILOSEC) 10 mg capsule Take 10 mg by mouth daily.  albuterol (PROVENTIL HFA, VENTOLIN HFA, PROAIR HFA) 90 mcg/actuation inhaler Take 1 Puff by inhalation every six (6) hours as needed for Wheezing.  metFORMIN (GLUMETZA) 500 mg TG24 24 hour tablet Take 500 mg by mouth two (2) times a day.        Allergies   Allergen Reactions    Macrodantin [Nitrofurantoin Macrocrystalline] Itching    Tape [Adhesive] Other (comments)     Burned skin when had wound vac     Past Medical History:   Diagnosis Date    Abdominal adhesions     Anemia     Arthritis     all over the body    Asthma     Asthma     Diabetes ([address]     Diabetes mellitus     Dyskinesia     bilateral    Essential hypertension     GERD (gastroesophageal reflux disease)     Hypertension     Menopause     Stool color black      Past Surgical History:   Procedure Laterality Date    HX CHOLECYSTECTOMY  6/28/10    HX COLONOSCOPY      HX ENDOSCOPY      HX HERNIA REPAIR      HX HYSTERECTOMY      Fibroids    HX KNEE REPLACEMENT      HX OOPHORECTOMY      TISSUE LOCALIZATION-EXCISION       Family History   Problem Relation Age of Onset    Hypertension Other      parent    Breast Cancer Other 21    Heart Disease Father     Hypertension Father     Diabetes Father     Diabetes Mother     Hypertension Other      sibling    Diabetes Other      parent    Diabetes Sister     Kidney Disease Brother     Diabetes Brother      Social History   Substance Use Topics    Smoking status: Never Smoker    Smokeless tobacco: Never Used    Alcohol use No       ROS   Constitutional: Negative for chills. HENT: Negative for congestion and sneezing. Eyes: Negative for visual disturbance. Respiratory: Negative for cough and shortness of breath. Gastrointestinal: Positive for abdominal pain (left). Genitourinary: Positive for flank pain (left) and frequency. Negative for difficulty urinating, dysuria and hematuria. Skin: Negative for rash. Neurological: Positive for light-headedness. All other systems reviewed and are negative.       Objective:  Vitals:    06/02/17 1539   BP: 115/70   Pulse: 80   Resp: 14   Temp: 97.5 °F (36.4 °C)   TempSrc: Oral   SpO2: 99%   Weight: 156 lb (70.8 kg)   Height: 5' 4.02\" (1.626 m)   PainSc:   0 - No pain       Constitutional: She is oriented to person, place, and time. She appears well-developed and well-nourished. HENT:   Head: Normocephalic and atraumatic. Neck: Neck supple. No JVD present. Cardiovascular: Normal rate and regular rhythm. Pulmonary/Chest: Effort normal and breath sounds normal. No respiratory distress. Abdominal: Soft. She exhibits no distension. There is tenderness in the left lower quadrant. There is CVA tenderness (left). There is no rebound and no guarding. Musculoskeletal: She exhibits no edema. Neurological: She is alert and oriented to person, place, and time. Skin: Skin is warm and dry. No erythema. Psychiatric: Judgment normal.   3 suture (s) were removed by  MD without difficulty. Wound edges were well approximated. Steri-strips were used. There was not evidence of infection. Patient did tolerate well. TESTS  CT abd and pelvis  IMPRESSION:     1. Postoperative changes of small bowel resection and reanastomosis without  evidence of recurrent bowel obstruction. 2. Mild anterior abdominal wall stranding without evidence of focal fluid  collection. 3. Unchanged left pelvic sidewall hypoattenuating lesion likely reflective of a  small peritoneal inclusion cyst versus ovarian cyst.  4. Unchanged mild hepatic steatosis. 5. Cholecystectomy. Assessment/Plan:    1. Peritoneal cyst  Discussed findings with pt; pt is s/p total hysterectomy so ovarian cyst is unlikely; discussed likelihood of peritoneal cyst which are not typically painful. Continue pain management and return if persists    2. Hematuria  Evaluate for infection; no stones on CT  - REFERRAL TO UROLOGY  - CULTURE, URINE; Future    3. Visit for suture removal      Lab review: labs reviewed, I note that renal functions abnormal, hemogram normal      I have discussed the diagnosis with the patient and the intended plan as seen in the above orders. The patient has received an after-visit summary and questions were answered concerning future plans.   I have discussed medication side effects and warnings with the patient as well. I have reviewed the plan of care with the patient, accepted their input and they are in agreement with the treatment goals. Follow-up Disposition:  Return if symptoms worsen or fail to improve.

## 2017-06-02 NOTE — DISCHARGE INSTRUCTIONS

## 2017-06-02 NOTE — MR AVS SNAPSHOT
Visit Information Date & Time Provider Department Dept. Phone Encounter #  
 6/2/2017  4:00 PM Júnior Felipe, [address] [facility] [id number] Follow-up Instructions Return if symptoms worsen or fail to improve. Upcoming Health Maintenance Date Due Hepatitis C Screening 1960 Pneumococcal 19-64 Medium Risk (1 of 1 - PPSV23) 2/4/1979 DTaP/Tdap/Td series (1 - Tdap) 2/4/1981 PAP AKA CERVICAL CYTOLOGY 2/4/1981 INFLUENZA AGE 9 TO ADULT 8/1/2017 HEMOGLOBIN A1C Q6M 9/27/2017 FOOT EXAM Q1 3/27/2018 MICROALBUMIN Q1 3/27/2018 LIPID PANEL Q1 3/27/2018 EYE EXAM RETINAL OR DILATED Q1 5/29/2018 BREAST CANCER SCRN MAMMOGRAM 1/6/2019 COLONOSCOPY 3/3/2026 Allergies as of 6/2/2017  Review Complete On: 6/2/2017 By: Miki Valentine Severity Noted Reaction Type Reactions Macrodantin [Nitrofurantoin Macrocrystalline]  01/31/2012    Itching Tape [Adhesive]  09/30/2014    Other (comments) Burned skin when had wound vac Current Immunizations  Reviewed on 2/22/2017 Name Date Influenza Vaccine 11/15/2016 Not reviewed this visit You Were Diagnosed With   
  
 Codes Comments Peritoneal cyst    -  Primary ICD-10-CM: K66.8 ICD-9-CM: 568.89 Hematuria     ICD-10-CM: R31.9 ICD-9-CM: 599.70 Visit for suture removal     ICD-10-CM: Z48.02 
ICD-9-CM: V58.32 Vitals BP Pulse Temp Resp Height(growth percentile) Weight(growth percentile) 115/70 (BP 1 Location: Left arm, BP Patient Position: Sitting) 80 97.5 °F (36.4 °C) (Oral) 14 5' 4.02\" (1.626 m) 156 lb (70.8 kg) SpO2 BMI OB Status Smoking Status 99% 26.76 kg/m2 Hysterectomy Never Smoker BMI and BSA Data Body Mass Index Body Surface Area  
 26.76 kg/m 2 1.79 m 2 Preferred Pharmacy Pharmacy Name Phone [facility] [address]  [facility] [phone] Your Updated Medication List  
 This list is accurate as of: 6/2/17  4:20 PM.  Always use your most recent med list.  
  
  
  
  
 albuterol 90 mcg/actuation inhaler Commonly known as:  PROVENTIL HFA, VENTOLIN HFA, PROAIR HFA Take 1 Puff by inhalation every six (6) hours as needed for Wheezing. amLODIPine 2.5 mg tablet Commonly known as:  [employer] Take 2.5 mg by mouth nightly. dapagliflozin 10 mg Tab Commonly known as:  [employer] Take 1 Tab by mouth daily. Indications: type 2 diabetes mellitus  
  
 escitalopram oxalate 10 mg tablet Commonly known as:  Anita Peak Take 1 Tab by mouth daily. glucose blood VI test strips strip Commonly known as:  ONETOUCH ULTRA TEST Check blood glucose as directed GLUMETZA 500 mg Tg24 24 hour tablet Generic drug:  metFORMIN Take 500 mg by mouth two (2) times a day. HYDROcodone-acetaminophen 5-325 mg per tablet Commonly known as:  Brooke Holiday Take 1 Tab by mouth every four (4) hours as needed for Pain. Max Daily Amount: 6 Tabs. LIPITOR 20 mg tablet Generic drug:  atorvastatin Take 40 mg by mouth daily. omeprazole 10 mg capsule Commonly known as:  PRILOSEC Take 10 mg by mouth daily. ondansetron 4 mg disintegrating tablet Commonly known as:  ZOFRAN ODT Take 1 Tab by mouth every eight (8) hours as needed for Nausea. ondansetron hcl 4 mg tablet Commonly known as:  ZOFRAN (AS HYDROCHLORIDE) Take 1 Tab by mouth every eight (8) hours as needed for Nausea. ZIAC 2.5-6.25 mg per tablet Generic drug:  bisoprolol-hydroCHLOROthiazide Take 1 Tab by mouth daily. Prescriptions Printed Refills HYDROcodone-acetaminophen (NORCO) 5-325 mg per tablet 0 Sig: Take 1 Tab by mouth every four (4) hours as needed for Pain. Max Daily Amount: 6 Tabs. Class: Print Route: Oral  
  
We Performed the Following REFERRAL TO UROLOGY [SGE582 Custom] Comments: Please evaluate 63 y/o female patient for hematuria. Follow-up Instructions Return if symptoms worsen or fail to improve. To-Do List   
 06/02/2017 Microbiology:  CULTURE, URINE Referral Information Referral ID Referred By Referred To  
  
 [id number] Alissa DEGROOT MD   
   [address], [address] [place] [address] Phone: [phone] Fax: [phone] Visits Status Start Date End Date 1 New Request 6/2/17 6/2/18 If your referral has a status of pending review or denied, additional information will be sent to support the outcome of this decision. Patient Instructions Blood in the Urine: Care Instructions Your Care Instructions Blood in the urine, or hematuria, may make the urine look red, brown, or pink. There may be blood every time you urinate or just from time to time. You cannot always see blood in the urine, but it will show up in a urine test. 
Blood in the urine may be serious. It should always be checked by a doctor. Your doctor may recommend more tests, including an X-ray, a CT scan, or a cystoscopy (which lets a doctor look inside the urethra and bladder). Blood in the urine can be a sign of another problem. Common causes are bladder infections and kidney stones. An injury to your groin or your genital area can also cause bleeding in the urinary tract. Very hard exercisesuch as running a marathoncan cause blood in the urine. Blood in the urine can also be a sign of kidney disease or cancer in the bladder or kidney. Many cases of blood in the urine are caused by a harmless condition that runs in families. This is called benign familial hematuria. It does not need any treatment. Sometimes your urine may look red or brown even though it does not contain blood.  For example, not getting enough fluids (dehydration), taking certain medicines, or having a liver problem can change the color of your urine. Eating foods such as beets, rhubarb, or blackberries or foods with red food coloring can make your urine look red or pink. Follow-up care is a key part of your treatment and safety. Be sure to make and go to all appointments, and call your doctor if you are having problems. It's also a good idea to know your test results and keep a list of the medicines you take. When should you call for help? Call your doctor now or seek immediate medical care if: 
· You have symptoms of a urinary infection. For example: ¨ You have pus in your urine. ¨ You have pain in your back just below your rib cage. This is called flank pain. ¨ You have a fever, chills, or body aches. ¨ It hurts to urinate. ¨ You have groin or belly pain. · You have more blood in your urine. Watch closely for changes in your health, and be sure to contact your doctor if: 
· You have new urination problems. · You do not get better as expected. Where can you learn more? Go to http://betzy-didi.info/. Enter L590 in the search box to learn more about \"Blood in the Urine: Care Instructions. \" Current as of: August 12, 2016 Content Version: 11.2 © 0240-4866 [employer]. Care instructions adapted under license by [employer] (which disclaims liability or warranty for this information). If you have questions about a medical condition or this instruction, always ask your healthcare professional. [place] [address] any warranty or liability for your use of this information. Introducing [facility] & HEALTH SERVICES! [address] introduces [employer] patient portal. Now you can access parts of your medical record, email your doctor's office, and request medication refills online. 1. In your internet browser, go to [email or web address]. [employer] 2. Click on the First Time User? Click Here link in the Sign In box. You will see the New Member Sign Up page. 3. Enter your [employer] Access Code exactly as it appears below. You will not need to use this code after youve completed the sign-up process. If you do not sign up before the expiration date, you must request a new code. · [employer] Access Code: [email or web address] Expires: 7/23/2017 10:48 AM 
 
4. Enter the last four digits of your Social Security Number (xxxx) and Date of Birth (mm/dd/yyyy) as indicated and click Submit. You will be taken to the next sign-up page. 5. Create a [employer] ID. This will be your [employer] login ID and cannot be changed, so think of one that is secure and easy to remember. 6. Create a [employer] password. You can change your password at any time. 7. Enter your Password Reset Question and Answer. This can be used at a later time if you forget your password. 8. Enter your e-mail address. You will receive e-mail notification when new information is available in [address] 9. Click Sign Up. You can now view and download portions of your medical record. 10. Click the Download Summary menu link to download a portable copy of your medical information. If you have questions, please visit the Frequently Asked Questions section of the [employer] website. Remember, [employer] is NOT to be used for urgent needs. For medical emergencies, dial 911. Now available from your iPhone and Android! Please provide this summary of care documentation to your next provider. Your primary care clinician is listed as Eagan Modoc. If you have any questions after today's visit, please call [phone].

## 2017-06-02 NOTE — PATIENT INSTRUCTIONS
Blood in the Urine: Care Instructions  Your Care Instructions  Blood in the urine, or hematuria, may make the urine look red, brown, or pink. There may be blood every time you urinate or just from time to time. You cannot always see blood in the urine, but it will show up in a urine test.  Blood in the urine may be serious. It should always be checked by a doctor. Your doctor may recommend more tests, including an X-ray, a CT scan, or a cystoscopy (which lets a doctor look inside the urethra and bladder). Blood in the urine can be a sign of another problem. Common causes are bladder infections and kidney stones. An injury to your groin or your genital area can also cause bleeding in the urinary tract. Very hard exercisesuch as running a marathoncan cause blood in the urine. Blood in the urine can also be a sign of kidney disease or cancer in the bladder or kidney. Many cases of blood in the urine are caused by a harmless condition that runs in families. This is called benign familial hematuria. It does not need any treatment. Sometimes your urine may look red or brown even though it does not contain blood. For example, not getting enough fluids (dehydration), taking certain medicines, or having a liver problem can change the color of your urine. Eating foods such as beets, rhubarb, or blackberries or foods with red food coloring can make your urine look red or pink. Follow-up care is a key part of your treatment and safety. Be sure to make and go to all appointments, and call your doctor if you are having problems. It's also a good idea to know your test results and keep a list of the medicines you take. When should you call for help? Call your doctor now or seek immediate medical care if:  · You have symptoms of a urinary infection. For example:  ¨ You have pus in your urine. ¨ You have pain in your back just below your rib cage. This is called flank pain.   ¨ You have a fever, chills, or body aches.  ¨ It hurts to urinate. ¨ You have groin or belly pain. · You have more blood in your urine. Watch closely for changes in your health, and be sure to contact your doctor if:  · You have new urination problems. · You do not get better as expected. Where can you learn more? Go to http://betzy-didi.info/. Enter W748 in the search box to learn more about \"Blood in the Urine: Care Instructions. \"  Current as of: August 12, 2016  Content Version: 11.2  © 0217-9333 [employer]. Care instructions adapted under license by [employer] (which disclaims liability or warranty for this information). If you have questions about a medical condition or this instruction, always ask your healthcare professional. [address] any warranty or liability for your use of this information.

## 2017-06-02 NOTE — ED TRIAGE NOTES
C/o intermittent sharp lower abdominal pain, mid left back pain, shortness of breath, and urinary frequency.

## 2017-06-02 NOTE — ED PROVIDER NOTES
HPI Comments: 10:46 PM Cindy Copeland is a 62 y.o. female with h/o anemia, asthma, DM, HTN, and GERD who presents to ED complaining of left sided, sharp flank pain starting yesterday. Pt reports that she is also experiencing light-headedness, diaphoresis, extreme thirst, increased urinary frequency, and left sided abd pain. Pt states she has had a lot of abdominal surgeries including her appendix removed during which they nicked her bowel causing her to go into septic shock and be hospitalized for 1 month. Pt has stiches on the back of her left thigh from having two little \"tumors\" removed; she states it is still very painful. She has an unknown history of kidney stones. Pt denies dysuria, hematuria, vaginal bleeding, or any other symptoms at this time. PCP: Chris Martin MD      The history is provided by the patient. No  was used.         Past Medical History:   Diagnosis Date    Abdominal adhesions     Anemia     Arthritis     all over the body    Asthma     Asthma     Diabetes ([facility] [address]     Diabetes mellitus     Dyskinesia     bilateral    Essential hypertension     GERD (gastroesophageal reflux disease)     Hypertension     Menopause     Stool color black        Past Surgical History:   Procedure Laterality Date    HX CHOLECYSTECTOMY  6/28/10    HX COLONOSCOPY      HX ENDOSCOPY      HX HERNIA REPAIR      HX HYSTERECTOMY      Fibroids    HX KNEE REPLACEMENT      HX OOPHORECTOMY      TISSUE LOCALIZATION-EXCISION           Family History:   Problem Relation Age of Onset    Hypertension Other      parent    Breast Cancer Other 21    Heart Disease Father     Hypertension Father     Diabetes Father     Diabetes Mother     Hypertension Other      sibling    Diabetes Other      parent    Diabetes Sister     Kidney Disease Brother     Diabetes Brother        Social History     Social History    Marital status:      Spouse name: N/A    Number of children: N/A    Years of education: N/A     Occupational History    Not on file. Social History Main Topics    Smoking status: Never Smoker    Smokeless tobacco: Never Used    Alcohol use No    Drug use: No    Sexual activity: Yes     Partners: Male     Birth control/ protection: None     Other Topics Concern    Not on file     Social History Narrative         ALLERGIES: Macrodantin [nitrofurantoin macrocrystalline] and Tape [adhesive]    Review of Systems   Constitutional: Negative for chills. HENT: Negative for congestion and sneezing. Eyes: Negative for visual disturbance. Respiratory: Negative for cough and shortness of breath. Gastrointestinal: Positive for abdominal pain (left). Genitourinary: Positive for flank pain (left) and frequency. Negative for difficulty urinating, dysuria and hematuria. Skin: Negative for rash. Neurological: Positive for light-headedness. Vitals:    06/01/17 2345 06/02/17 0000 06/02/17 0015 06/02/17 0030   BP: 128/84 128/79 123/79 130/89   Pulse:       Resp:       Temp:       SpO2: 98% 100% 100% 100%   Weight:       Height:                Physical Exam   Constitutional: She is oriented to person, place, and time. She appears well-developed and well-nourished. HENT:   Head: Normocephalic and atraumatic. Neck: Neck supple. No JVD present. Cardiovascular: Normal rate and regular rhythm. Pulmonary/Chest: Effort normal and breath sounds normal. No respiratory distress. Abdominal: Soft. She exhibits no distension. There is tenderness in the left lower quadrant. There is CVA tenderness (left). There is no rebound and no guarding. Musculoskeletal: She exhibits no edema. Neurological: She is alert and oriented to person, place, and time. Skin: Skin is warm and dry. No erythema.    Psychiatric: Judgment normal.        MDM  Number of Diagnoses or Management Options  Diagnosis management comments: 63 y/o female presents with L flank and abd pain    UA showing blood, no uti, concern for stone, will ct to eval.   Check basic labs       Amount and/or Complexity of Data Reviewed  Clinical lab tests: ordered and reviewed  Tests in the radiology section of CPT®: ordered and reviewed  Tests in the medicine section of CPT®: ordered and reviewed      ED Course       Procedures    Vitals:  Patient Vitals for the past 12 hrs:   Temp Pulse Resp BP SpO2   06/02/17 0030 - - - 130/89 100 %   06/02/17 0015 - - - 123/79 100 %   06/02/17 0000 - - - 128/79 100 %   06/01/17 2345 - - - 128/84 98 %   06/01/17 2313 - - - - 100 %   06/01/17 2311 - - - 121/89 -   06/01/17 2154 97.8 °F (36.6 °C) 90 17 (!) 133/97 98 %       Medications ordered:   Medications   morphine 4 mg/mL injection (not administered)   ketorolac (TORADOL) 15 mg/mL injection (not administered)   ketorolac (TORADOL) 30 mg/mL (1 mL) injection (not administered)   morphine injection 4 mg (4 mg IntraVENous Given 6/1/17 2338)   ondansetron (ZOFRAN) injection 4 mg (4 mg IntraVENous Given 6/1/17 2338)   morphine injection 4 mg (4 mg IntraVENous Given 6/2/17 0116)   ketorolac (TORADOL) injection 15 mg (15 mg IntraVENous Given 6/2/17 0120)         Lab findings:  Recent Results (from the past 12 hour(s))   EKG, 12 LEAD, INITIAL    Collection Time: 06/01/17 10:01 PM   Result Value Ref Range    Ventricular Rate 90 BPM    Atrial Rate 90 BPM    P-R Interval 138 ms    QRS Duration 70 ms    Q-T Interval 354 ms    QTC Calculation (Bezet) 433 ms    Calculated P Axis 60 degrees    Calculated R Axis 56 degrees    Calculated T Axis 79 degrees    Diagnosis       Normal sinus rhythm  Nonspecific T wave abnormality  Abnormal ECG  When compared with ECG of 18-APR-2017 12:58,  No significant change was found     URINALYSIS W/ RFLX MICROSCOPIC    Collection Time: 06/01/17 10:04 PM   Result Value Ref Range    Color YELLOW      Appearance CLEAR      Specific gravity 1.023 1.005 - 1.030      pH (UA) 7.0 5.0 - 8.0      Protein NEGATIVE NEG mg/dL    Glucose >1000 (A) NEG mg/dL    Ketone NEGATIVE  NEG mg/dL    Bilirubin NEGATIVE  NEG      Blood LARGE (A) NEG      Urobilinogen 1.0 0.2 - 1.0 EU/dL    Nitrites NEGATIVE  NEG      Leukocyte Esterase NEGATIVE  NEG     URINE MICROSCOPIC ONLY    Collection Time: 06/01/17 10:04 PM   Result Value Ref Range    WBC 0 to 3 0 - 4 /hpf    RBC 20 to 30 0 - 5 /hpf    Epithelial cells FEW 0 - 5 /lpf    Bacteria NEGATIVE  NEG /hpf   CBC WITH AUTOMATED DIFF    Collection Time: 06/01/17 11:20 PM   Result Value Ref Range    WBC 6.7 4.6 - 13.2 K/uL    RBC 4.33 4.20 - 5.30 M/uL    HGB 12.4 12.0 - 16.0 g/dL    HCT 37.5 35.0 - 45.0 %    MCV 86.6 74.0 - 97.0 FL    MCH 28.6 24.0 - 34.0 PG    MCHC 33.1 31.0 - 37.0 g/dL    RDW 14.1 11.6 - 14.5 %    PLATELET 969 590 - 913 K/uL    MPV 10.3 9.2 - 11.8 FL    NEUTROPHILS 59 40 - 73 %    LYMPHOCYTES 31 21 - 52 %    MONOCYTES 8 3 - 10 %    EOSINOPHILS 2 0 - 5 %    BASOPHILS 0 0 - 2 %    ABS. NEUTROPHILS 3.9 1.8 - 8.0 K/UL    ABS. LYMPHOCYTES 2.1 0.9 - 3.6 K/UL    ABS. MONOCYTES 0.6 0.05 - 1.2 K/UL    ABS. EOSINOPHILS 0.1 0.0 - 0.4 K/UL    ABS. BASOPHILS 0.0 0.0 - 0.06 K/UL    DF AUTOMATED     METABOLIC PANEL, COMPREHENSIVE    Collection Time: 06/01/17 11:20 PM   Result Value Ref Range    Sodium 145 136 - 145 mmol/L    Potassium 3.6 3.5 - 5.5 mmol/L    Chloride 107 100 - 108 mmol/L    CO2 30 21 - 32 mmol/L    Anion gap 8 3.0 - 18 mmol/L    Glucose 87 74 - 99 mg/dL    BUN 19 (H) 7.0 - 18 MG/DL    Creatinine 1.38 (H) 0.6 - 1.3 MG/DL    BUN/Creatinine ratio 14 12 - 20      GFR est AA 48 (L) >60 ml/min/1.73m2    GFR est non-AA 39 (L) >60 ml/min/1.73m2    Calcium 9.3 8.5 - 10.1 MG/DL    Bilirubin, total 0.3 0.2 - 1.0 MG/DL    ALT (SGPT) 18 13 - 56 U/L    AST (SGOT) 13 (L) 15 - 37 U/L    Alk.  phosphatase 127 (H) 45 - 117 U/L    Protein, total 6.8 6.4 - 8.2 g/dL    Albumin 3.2 (L) 3.4 - 5.0 g/dL    Globulin 3.6 2.0 - 4.0 g/dL    A-G Ratio 0.9 0.8 - 1.7     LIPASE    Collection Time: 06/01/17 11:20 PM   Result Value Ref Range    Lipase 257 73 - 393 U/L   CARDIAC PANEL,(CK, CKMB & TROPONIN)    Collection Time: 06/01/17 11:20 PM   Result Value Ref Range    CK 76 26 - 192 U/L    CK - MB <1.0 <3.6 ng/ml    CK-MB Index Cannot be calulated 0.0 - 4.0 %    Troponin-I, Qt. <0.02 0.0 - 0.045 NG/ML       EKG interpretation by ED Physician:  9086, rate 90, normal axis, NSR, normal intervals no ST changes    X-Ray, CT or other radiology findings or impressions:  CT ABD PELV WO CONT    (Results Pending)   Finding:  -no hydronephrosis or renal/ureteral stones  -post surgical changes of small bowel resection/anastamosis. Persistent anterior pelvic inflammation, mild  -no bowel obstruction  -stable L ovarian vs. Peritoneal inclusion cyst  -steatosis  -cholecystectomy     Interpreted by Radiologist     Reevaluation of patient:   1:58 AM I have reassessed the patient and discussed their results and diagnosis. Pt will be discharged in stable condition. Patient is to return to emergency department if any new or worsening condition. Patient understands and verbalizes agreement with plan. Disposition:  Diagnosis:   1. Left ovarian cyst    2. Hematuria        Disposition: Discharged in stable condition      Follow-up Information     None           Patient's Medications   Start Taking    No medications on file   Continue Taking    ALBUTEROL (PROVENTIL HFA, VENTOLIN HFA, PROAIR HFA) 90 MCG/ACTUATION INHALER    Take 1 Puff by inhalation every six (6) hours as needed for Wheezing. AMLODIPINE (NORVASC) 2.5 MG TABLET    Take 2.5 mg by mouth nightly. ATORVASTATIN (LIPITOR) 20 MG TABLET    Take 40 mg by mouth daily. BISOPROLOL-HYDROCHLOROTHIAZIDE (ZIAC) 2.5-6.25 MG PER TABLET    Take 1 Tab by mouth daily. DAPAGLIFLOZIN (FARXIGA) 10 MG TAB    Take 1 Tab by mouth daily. Indications: type 2 diabetes mellitus    ESCITALOPRAM OXALATE (LEXAPRO) 10 MG TABLET    Take 1 Tab by mouth daily.     GLUCOSE BLOOD VI TEST STRIPS (ONETOUCH ULTRA TEST) STRIP    Check blood glucose as directed    HYDROCODONE-ACETAMINOPHEN (NORCO) 5-325 MG PER TABLET    Take 1 Tab by mouth every four (4) hours as needed for Pain. Max Daily Amount: 6 Tabs. METFORMIN (GLUMETZA) 500 MG TG24 24 HOUR TABLET    Take 500 mg by mouth two (2) times a day. OMEPRAZOLE (PRILOSEC) 10 MG CAPSULE    Take 10 mg by mouth daily. ONDANSETRON (ZOFRAN ODT) 4 MG DISINTEGRATING TABLET    Take 1 Tab by mouth every eight (8) hours as needed for Nausea. ONDANSETRON HCL (ZOFRAN, AS HYDROCHLORIDE,) 4 MG TABLET    Take 1 Tab by mouth every eight (8) hours as needed for Nausea. These Medications have changed    No medications on file   Stop Taking    No medications on file     Scribe Attestation  Ysabel Stockton acting as a scribe for and in the presence of Wing Margarita DO  June 02, 2017 at 1:58 AM       Provider Attestation:  I personally performed the services described in the documentation, reviewed the documentation, as recorded by the scribe in my presence, and it accurately and completely records my words and actions.   Wing Margarita DO      Signed by: Ysabel Moulton, June 02, 2017 at 1:58 AM

## 2017-06-02 NOTE — ED NOTES
Pt called staff to room stating \"the pain never eased off my back. \" Dr. Los Lara made aware and new orders written.

## 2017-06-02 NOTE — PROGRESS NOTES
Guillermo Walker is a 62 y.o. female    Chief Complaint   Patient presents with   Rochelle Le Other     wants to see gyn in order to figure out with ovarian cyst got told about  over at ER yesterday, however patient is under impression she had it removed durin ghysterectomy. Pt is alos wants to see urology for problems with bladder(cyst)     Surgical Follow-up     stitches removal upper left posterior leg.  Medication Refill     out of percocet, if possible refill for pain management.

## 2017-06-07 LAB — CULTURE RESULT, SENTARA: NORMAL

## 2017-06-14 ENCOUNTER — OFFICE VISIT (OUTPATIENT)
Dept: OBGYN CLINIC | Age: 57
End: 2017-06-14

## 2017-06-14 VITALS
SYSTOLIC BLOOD PRESSURE: 127 MMHG | HEIGHT: 64 IN | BODY MASS INDEX: 25.95 KG/M2 | HEART RATE: 85 BPM | DIASTOLIC BLOOD PRESSURE: 86 MMHG | WEIGHT: 152 LBS

## 2017-06-14 DIAGNOSIS — G89.29 CHRONIC PELVIC PAIN IN FEMALE: Primary | ICD-10-CM

## 2017-06-14 DIAGNOSIS — R10.2 CHRONIC PELVIC PAIN IN FEMALE: Primary | ICD-10-CM

## 2017-06-16 NOTE — PROGRESS NOTES
61 y/o G0 presents to the office for complaint of chronic pelvic pain. She has had multiple abdominal surgeries, the most recent was earlier this year. She states the pain is mostly on the left lower side. She states it is relentless. It is keeping her up at night. She denies any trauma. She is   Not constipated. She notes the pain is intermittent. During her last surgery, she states she was told she had severe adhesions. The patient notes a long history of constipation. She has seen GI and has an upcoming appointment. She is rarely sexually active and notes the pain prevents her from having sexual relations. ROS: + abdominal pain, + urinary pressure;   Due to the pain, she was sent for imaging, which was concerning for a ovarian cyst or peritoneal cyst.    Active Ambulatory Problems     Diagnosis Date Noted    Osteoarthritis of left knee 06/27/2016    Hypertension 06/27/2016    Hyperlipidemia 06/27/2016    GERD (gastroesophageal reflux disease) 06/27/2016    Asthma 06/27/2016    Type 2 diabetes mellitus ([facility] [address] 06/27/2016    SBO (small bowel obstruction) ([facility] [address] 02/21/2017    Abdominal pain 03/14/2017    Post-operative pain 03/14/2017     Resolved Ambulatory Problems     Diagnosis Date Noted    Abdominal pain     Follow-up examination following surgery 10/30/2010    Abdominal pain, chronic, right lower quadrant 08/13/2012    Pelvic abscess in female 03/02/2017     Past Medical History:   Diagnosis Date    Abdominal adhesions     Anemia     Arthritis     Asthma     Asthma     Diabetes ([facility] [address]     Diabetes mellitus     Dyskinesia     Essential hypertension     GERD (gastroesophageal reflux disease)     Hypertension     Menopause     Stool color black      Visit Vitals    /86    Pulse 85    Ht 5' 4\" (1.626 m)    Wt 152 lb (68.9 kg)    BMI 26.09 kg/m2     General: pleasant, A&Ox 3, tearful during interview. Abdomen: well healing vertical laparotomy scar noted.   Abdomen: soft, ND  Tender to palpation diffusely L>R. No rebound; mild guarding.  + supraprubic TTP  SVE: NEFG, cervix removed. Cuff well healed. No defects, bleeding or discharge appreciated. CT pelvis: Unchanged left pelvic sidewall hypoattenuating lesion likely reflective of a  small peritoneal inclusion cyst versus ovarian cyst.  I reviewed this CT in detail with the patient. A/P:  Chronic abdominal pain- most likely non-GYN in origin. Pt. Has had multiple abdominal surgeries and most assuredly has bowel adhesions as   Revealed by her recent laparotomy operative report. Previous history is c/w bilateral oophorectomy at separate occasion from her total abdominal hysterectomy. Stable cyst like structure is most likely a peritoneal inclusion cyst and not c/w this extreme colicky pain. The patient noted improvement post recent laparotomy and extensive lysis of adhesions. Noted  pain- agree with urology consult, which is pending. Discussed the risks of repeated abdominal surgery and the perpetual formation of adhesions post-operatively. Recommend exploring all other options including  work-up, PT, and pain management prior to surgery. 30 minutes spent with this complex patient.

## 2017-06-20 ENCOUNTER — HOSPITAL ENCOUNTER (EMERGENCY)
Age: 57
Discharge: HOME | End: 2017-06-20
Attending: EMERGENCY MEDICINE
Payer: COMMERCIAL

## 2017-06-20 ENCOUNTER — APPOINTMENT (OUTPATIENT)
Dept: CT IMAGING | Age: 57
End: 2017-06-20
Attending: PHYSICIAN ASSISTANT
Payer: COMMERCIAL

## 2017-06-20 ENCOUNTER — OFFICE VISIT (OUTPATIENT)
Dept: OBGYN CLINIC | Age: 57
End: 2017-06-20

## 2017-06-20 VITALS
TEMPERATURE: 97.7 F | HEART RATE: 100 BPM | DIASTOLIC BLOOD PRESSURE: 90 MMHG | RESPIRATION RATE: 20 BRPM | OXYGEN SATURATION: 99 % | SYSTOLIC BLOOD PRESSURE: 123 MMHG

## 2017-06-20 VITALS
BODY MASS INDEX: 25.78 KG/M2 | DIASTOLIC BLOOD PRESSURE: 86 MMHG | SYSTOLIC BLOOD PRESSURE: 123 MMHG | HEART RATE: 97 BPM | HEIGHT: 64 IN | WEIGHT: 151 LBS

## 2017-06-20 DIAGNOSIS — Z87.19 HX SBO: ICD-10-CM

## 2017-06-20 DIAGNOSIS — G89.29 CHRONIC FEMALE PELVIC PAIN: Primary | ICD-10-CM

## 2017-06-20 DIAGNOSIS — R10.2 CHRONIC FEMALE PELVIC PAIN: Primary | ICD-10-CM

## 2017-06-20 DIAGNOSIS — R10.84 ABDOMINAL PAIN, GENERALIZED: Primary | ICD-10-CM

## 2017-06-20 DIAGNOSIS — E27.8 ADRENAL CYST (HCC): ICD-10-CM

## 2017-06-20 LAB
ALBUMIN SERPL BCP-MCNC: 3.2 G/DL (ref 3.4–5)
ALBUMIN/GLOB SERPL: 0.9 {RATIO} (ref 0.8–1.7)
ALP SERPL-CCNC: 127 U/L (ref 45–117)
ALT SERPL-CCNC: 25 U/L (ref 13–56)
ANION GAP BLD CALC-SCNC: 6 MMOL/L (ref 3–18)
APPEARANCE UR: CLEAR
AST SERPL W P-5'-P-CCNC: 14 U/L (ref 15–37)
BACTERIA URNS QL MICRO: ABNORMAL /HPF
BASOPHILS # BLD AUTO: 0 K/UL (ref 0–0.06)
BASOPHILS # BLD: 0 % (ref 0–2)
BILIRUB SERPL-MCNC: 0.3 MG/DL (ref 0.2–1)
BILIRUB UR QL: NEGATIVE
BUN SERPL-MCNC: 16 MG/DL (ref 7–18)
BUN/CREAT SERPL: 12 (ref 12–20)
CALCIUM SERPL-MCNC: 8.8 MG/DL (ref 8.5–10.1)
CHLORIDE SERPL-SCNC: 110 MMOL/L (ref 100–108)
CO2 SERPL-SCNC: 29 MMOL/L (ref 21–32)
COLOR UR: YELLOW
CREAT SERPL-MCNC: 1.36 MG/DL (ref 0.6–1.3)
DIFFERENTIAL METHOD BLD: NORMAL
EOSINOPHIL # BLD: 0.1 K/UL (ref 0–0.4)
EOSINOPHIL NFR BLD: 2 % (ref 0–5)
EPITH CASTS URNS QL MICRO: ABNORMAL /LPF (ref 0–5)
ERYTHROCYTE [DISTWIDTH] IN BLOOD BY AUTOMATED COUNT: 14.3 % (ref 11.6–14.5)
GLOBULIN SER CALC-MCNC: 3.6 G/DL (ref 2–4)
GLUCOSE SERPL-MCNC: 97 MG/DL (ref 74–99)
GLUCOSE UR STRIP.AUTO-MCNC: >1000 MG/DL
HCT VFR BLD AUTO: 39.2 % (ref 35–45)
HGB BLD-MCNC: 12.8 G/DL (ref 12–16)
HGB UR QL STRIP: ABNORMAL
KETONES UR QL STRIP.AUTO: NEGATIVE MG/DL
LACTATE BLD-SCNC: 2.1 MMOL/L (ref 0.4–2)
LEUKOCYTE ESTERASE UR QL STRIP.AUTO: NEGATIVE
LIPASE SERPL-CCNC: 179 U/L (ref 73–393)
LYMPHOCYTES # BLD AUTO: 33 % (ref 21–52)
LYMPHOCYTES # BLD: 1.7 K/UL (ref 0.9–3.6)
MCH RBC QN AUTO: 28.4 PG (ref 24–34)
MCHC RBC AUTO-ENTMCNC: 32.7 G/DL (ref 31–37)
MCV RBC AUTO: 87.1 FL (ref 74–97)
MONOCYTES # BLD: 0.4 K/UL (ref 0.05–1.2)
MONOCYTES NFR BLD AUTO: 8 % (ref 3–10)
NEUTS SEG # BLD: 2.9 K/UL (ref 1.8–8)
NEUTS SEG NFR BLD AUTO: 57 % (ref 40–73)
NITRITE UR QL STRIP.AUTO: NEGATIVE
PH UR STRIP: 5 [PH] (ref 5–8)
PLATELET # BLD AUTO: 242 K/UL (ref 135–420)
PMV BLD AUTO: 10.5 FL (ref 9.2–11.8)
POTASSIUM SERPL-SCNC: 4 MMOL/L (ref 3.5–5.5)
PROT SERPL-MCNC: 6.8 G/DL (ref 6.4–8.2)
PROT UR STRIP-MCNC: NEGATIVE MG/DL
RBC # BLD AUTO: 4.5 M/UL (ref 4.2–5.3)
RBC #/AREA URNS HPF: ABNORMAL /HPF (ref 0–5)
SODIUM SERPL-SCNC: 145 MMOL/L (ref 136–145)
SP GR UR REFRACTOMETRY: 1.02 (ref 1–1.03)
UROBILINOGEN UR QL STRIP.AUTO: 0.2 EU/DL (ref 0.2–1)
WBC # BLD AUTO: 5.1 K/UL (ref 4.6–13.2)
WBC URNS QL MICRO: ABNORMAL /HPF (ref 0–4)

## 2017-06-20 PROCEDURE — 74011250637 HC RX REV CODE- 250/637: Performed by: PHYSICIAN ASSISTANT

## 2017-06-20 PROCEDURE — 96361 HYDRATE IV INFUSION ADD-ON: CPT

## 2017-06-20 PROCEDURE — 74011250636 HC RX REV CODE- 250/636: Performed by: PHYSICIAN ASSISTANT

## 2017-06-20 PROCEDURE — 96375 TX/PRO/DX INJ NEW DRUG ADDON: CPT

## 2017-06-20 PROCEDURE — 83690 ASSAY OF LIPASE: CPT | Performed by: PHYSICIAN ASSISTANT

## 2017-06-20 PROCEDURE — 96374 THER/PROPH/DIAG INJ IV PUSH: CPT

## 2017-06-20 PROCEDURE — 74011250636 HC RX REV CODE- 250/636: Performed by: EMERGENCY MEDICINE

## 2017-06-20 PROCEDURE — 74177 CT ABD & PELVIS W/CONTRAST: CPT

## 2017-06-20 PROCEDURE — 74011636320 HC RX REV CODE- 636/320: Performed by: EMERGENCY MEDICINE

## 2017-06-20 PROCEDURE — 81001 URINALYSIS AUTO W/SCOPE: CPT | Performed by: EMERGENCY MEDICINE

## 2017-06-20 PROCEDURE — 99283 EMERGENCY DEPT VISIT LOW MDM: CPT

## 2017-06-20 PROCEDURE — 83605 ASSAY OF LACTIC ACID: CPT

## 2017-06-20 PROCEDURE — 85025 COMPLETE CBC W/AUTO DIFF WBC: CPT | Performed by: EMERGENCY MEDICINE

## 2017-06-20 PROCEDURE — 80053 COMPREHEN METABOLIC PANEL: CPT | Performed by: EMERGENCY MEDICINE

## 2017-06-20 RX ORDER — OXYCODONE AND ACETAMINOPHEN 5; 325 MG/1; MG/1
1 TABLET ORAL
Status: COMPLETED | OUTPATIENT
Start: 2017-06-20 | End: 2017-06-20

## 2017-06-20 RX ORDER — OXYCODONE AND ACETAMINOPHEN 5; 325 MG/1; MG/1
TABLET ORAL
Qty: 12 TAB | Refills: 0 | Status: SHIPPED | OUTPATIENT
Start: 2017-06-20 | End: 2017-07-13

## 2017-06-20 RX ORDER — KETOROLAC TROMETHAMINE 30 MG/ML
30 INJECTION, SOLUTION INTRAMUSCULAR; INTRAVENOUS
Status: COMPLETED | OUTPATIENT
Start: 2017-06-20 | End: 2017-06-20

## 2017-06-20 RX ORDER — MORPHINE SULFATE 4 MG/ML
4 INJECTION, SOLUTION INTRAMUSCULAR; INTRAVENOUS
Status: COMPLETED | OUTPATIENT
Start: 2017-06-20 | End: 2017-06-20

## 2017-06-20 RX ORDER — ONDANSETRON 2 MG/ML
4 INJECTION INTRAMUSCULAR; INTRAVENOUS
Status: COMPLETED | OUTPATIENT
Start: 2017-06-20 | End: 2017-06-20

## 2017-06-20 RX ORDER — LORAZEPAM 2 MG/ML
1 INJECTION INTRAMUSCULAR
Status: COMPLETED | OUTPATIENT
Start: 2017-06-20 | End: 2017-06-20

## 2017-06-20 RX ADMIN — IOPAMIDOL 80 ML: 612 INJECTION, SOLUTION INTRAVENOUS at 17:30

## 2017-06-20 RX ADMIN — LORAZEPAM 1 MG: 2 INJECTION INTRAMUSCULAR; INTRAVENOUS at 14:33

## 2017-06-20 RX ADMIN — OXYCODONE HYDROCHLORIDE AND ACETAMINOPHEN 1 TABLET: 5; 325 TABLET ORAL at 20:06

## 2017-06-20 RX ADMIN — SODIUM CHLORIDE 1000 ML: 900 INJECTION, SOLUTION INTRAVENOUS at 14:33

## 2017-06-20 RX ADMIN — ONDANSETRON 4 MG: 2 INJECTION INTRAMUSCULAR; INTRAVENOUS at 16:41

## 2017-06-20 RX ADMIN — Medication 4 MG: at 16:41

## 2017-06-20 RX ADMIN — KETOROLAC TROMETHAMINE 30 MG: 30 INJECTION, SOLUTION INTRAMUSCULAR at 14:33

## 2017-06-20 NOTE — PATIENT INSTRUCTIONS
Pelvic Pain: Care Instructions  Your Care Instructions    Pelvic pain, or pain in the lower belly, can have many causes. Often pelvic pain is not serious and gets better in a few days. If your pain continues or gets worse, you may need tests and treatment. Tell your doctor about any new symptoms. These may be signs of a serious problem. Follow-up care is a key part of your treatment and safety. Be sure to make and go to all appointments, and call your doctor if you are having problems. It's also a good idea to know your test results and keep a list of the medicines you take. How can you care for yourself at home? · Rest until you feel better. Lie down, and raise your legs by placing a pillow under your knees. · Drink plenty of fluids. You may find that small, frequent sips are easier on your stomach than if you drink a lot at once. Avoid drinks with carbonation or caffeine, such as soda pop, tea, or coffee. · Try eating several small meals instead of 2 or 3 large ones. Eat mild foods, such as rice, dry toast or crackers, bananas, and applesauce. Avoid fatty and spicy foods, other fruits, and alcohol until 48 hours after your symptoms have gone away. · Take an over-the-counter pain medicine, such as acetaminophen (Tylenol), ibuprofen (Advil, Motrin), or naproxen (Aleve). Read and follow all instructions on the label. · Do not take two or more pain medicines at the same time unless the doctor told you to. Many pain medicines have acetaminophen, which is Tylenol. Too much acetaminophen (Tylenol) can be harmful. · You can put a heating pad, a warm cloth, or moist heat on your belly to relieve pain. When should you call for help? Call 911 anytime you think you may need emergency care. For example, call if:  · You passed out (lost consciousness). Call your doctor now or seek immediate medical care if:  · Your pain is getting worse. · Your pain becomes focused in one area of your belly.   · You have severe vaginal bleeding. Severe means that you are soaking through your usual pads or tampons every hour for 2 or more hours and passing clots of blood. · You have new symptoms such as fever, urinary problems or unexpected vaginal bleeding. · You are dizzy or lightheaded, or you feel like you may faint. Watch closely for changes in your health, and be sure to contact your doctor if:  · You do not get better as expected. Where can you learn more? Go to http://betzy-didi.info/. Enter [phone] in the search box to learn more about \"Pelvic Pain: Care Instructions. \"  Current as of: October 13, 2016  Content Version: 11.3  © 6964-8643 [employer]. Care instructions adapted under license by [employer] (which disclaims liability or warranty for this information). If you have questions about a medical condition or this instruction, always ask your healthcare professional. [address] any warranty or liability for your use of this information.

## 2017-06-20 NOTE — ED TRIAGE NOTES
Pt reports she was at [facility] and started having left groin pain, which she has a history of. Pt seen walking into ER. She is now in wheelchair.

## 2017-06-20 NOTE — PROGRESS NOTES
63 y/o with chronic abdominal-pelvic pain. Patient states she was seen by urology and has a CT urogram pending. She notes the pain remains intermittent and mostly on the left side. She denies any new symptoms. She has been sent to me because a peritoneal inclusion cyst is thought to be contributing to her pain. She would like it removed. Review of her chart reveals many CT scans, which characterize the cyst as benign in appearence and stable based on size and character. ROS: + intermittent, sharp abdominal pain    Visit Vitals    /86    Pulse 97    Ht 5' 4\" (1.626 m)    Wt 151 lb (68.5 kg)    BMI 25.92 kg/m2     Exam deferred    A/P:  Chronic pelvic pain with a stable left ovarian cyst vs peritoneal cyst.  Pt. Has h/o oophorectomy bilateral.  Operative reports not available. Discussed the high risks of surgical complications due to the recent exploratory laparotomy and h/o many abdominal surgeries. Based on the size and location of the cyst, not clear how much it is contributing to  Her pain. Discussed the many other causes including GI and adhesions. Recommend exhausting all other options prior to repeat abdominal surgery. Plan:  F/u with GI, Urology as scheduled. Referral placed for PT as well. RTO prn.

## 2017-06-20 NOTE — ED NOTES
I performed a brief evaluation, including history and physical, of the patient here in triage and I have determined that pt will need further treatment and evaluation from the main side ER physician. I have placed initial orders to help in expediting patients care.      June 20, 2017 at 1:15 PM - Bernadine Colin MD        Visit Vitals    /90    Pulse 100    Temp 97.7 °F (36.5 °C)    Resp 20    SpO2 99%

## 2017-06-20 NOTE — ED PROVIDER NOTES
Patient is a 62 y.o. female presenting with groin pain. Groin Pain   Associated symptoms include abdominal pain. Pertinent negatives include no chest pain. Milagros Paez is a 62 y.o. female with hx SBO with prior abdominal surgery, admitted in march 2017 due to SBO not needing surgery presents today with abdominal pain L lower quad started at rest earlier today currently \"10/10\" and denies of any vomiting, diarrhea, urinary sx, nausea. Past Medical History:   Diagnosis Date    Abdominal adhesions     Anemia     Arthritis     all over the body    Asthma     Asthma     Diabetes ([address]     Diabetes mellitus     Dyskinesia     bilateral    Essential hypertension     GERD (gastroesophageal reflux disease)     Hypertension     Menopause     Stool color black        Past Surgical History:   Procedure Laterality Date    HX CHOLECYSTECTOMY  6/28/10    HX COLONOSCOPY      HX ENDOSCOPY      HX HERNIA REPAIR      HX HYSTERECTOMY      Fibroids    HX KNEE REPLACEMENT      HX OOPHORECTOMY  12/2000    HX SMALL BOWEL RESECTION  12/2000    HX SMALL BOWEL RESECTION  02/21/2017    Dr. Dalila Ramirez           Family History:   Problem Relation Age of Onset    Hypertension Other      parent    Breast Cancer Other 21    Heart Disease Father     Hypertension Father     Diabetes Father     Diabetes Mother     Hypertension Other      sibling    Diabetes Other      parent    Diabetes Sister     Kidney Disease Brother     Diabetes Brother        Social History     Social History    Marital status:      Spouse name: N/A    Number of children: N/A    Years of education: N/A     Occupational History    Not on file.      Social History Main Topics    Smoking status: Never Smoker    Smokeless tobacco: Never Used    Alcohol use No    Drug use: No    Sexual activity: Yes     Partners: Male     Birth control/ protection: None     Other Topics Concern    Not on file     Social History Narrative         ALLERGIES: Macrodantin [nitrofurantoin macrocrystalline] and Tape [adhesive]    Review of Systems   Respiratory: Negative for chest tightness. Cardiovascular: Negative for chest pain, palpitations and leg swelling. Gastrointestinal: Positive for abdominal pain. Negative for anal bleeding, blood in stool, diarrhea, nausea and vomiting. Genitourinary: Negative for difficulty urinating, flank pain, hematuria and pelvic pain. Musculoskeletal: Negative for back pain, joint swelling, neck pain and neck stiffness. Vitals:    06/20/17 1316 06/20/17 1904   BP: 133/90 123/90   Pulse: 94 100   Resp: 18 20   Temp: 97.9 °F (36.6 °C) 97.7 °F (36.5 °C)   SpO2: 99%             Physical Exam   Constitutional: She is oriented to person, place, and time. She appears well-developed and well-nourished. Mild distress   Eyes: Conjunctivae and EOM are normal. Pupils are equal, round, and reactive to light. Pulmonary/Chest: Effort normal and breath sounds normal.   Abdominal: Bowel sounds are normal. There is tenderness. There is guarding. mildly distended    Neurological: She is alert and oriented to person, place, and time. Skin: Skin is warm. Psychiatric: She has a normal mood and affect. Her behavior is normal. Judgment and thought content normal.        MDM  Number of Diagnoses or Management Options  Diagnosis management comments: With hx of SBO and prior abdominal surgery, will get CT abdomen with contrast due to guarding.      ED Course       Procedures    CT ABDOMEN   No acute intra-abdominal abnormality identified.     Moderate fecal material in the colon.     Postsurgical changes in the anterior abdominal wall with mild fat stranding  similar to prior exam     Small bowel anastomosis present with no evidence of small bowel dilatation.     Persistent left adnexal cyst  Recent Results (from the past 12 hour(s))   URINALYSIS W/ RFLX MICROSCOPIC    Collection Time: 06/20/17  1:15 PM   Result Value Ref Range    Color YELLOW      Appearance CLEAR      Specific gravity 1.025 1.005 - 1.030      pH (UA) 5.0 5.0 - 8.0      Protein NEGATIVE  NEG mg/dL    Glucose >1000 (A) NEG mg/dL    Ketone NEGATIVE  NEG mg/dL    Bilirubin NEGATIVE  NEG      Blood LARGE (A) NEG      Urobilinogen 0.2 0.2 - 1.0 EU/dL    Nitrites NEGATIVE  NEG      Leukocyte Esterase NEGATIVE  NEG     URINE MICROSCOPIC ONLY    Collection Time: 06/20/17  1:15 PM   Result Value Ref Range    WBC 0 to 3 0 - 4 /hpf    RBC 11 to 20 0 - 5 /hpf    Epithelial cells FEW 0 - 5 /lpf    Bacteria FEW (A) NEG /hpf   CBC WITH AUTOMATED DIFF    Collection Time: 06/20/17  2:38 PM   Result Value Ref Range    WBC 5.1 4.6 - 13.2 K/uL    RBC 4.50 4. 20 - 5.30 M/uL    HGB 12.8 12.0 - 16.0 g/dL    HCT 39.2 35.0 - 45.0 %    MCV 87.1 74.0 - 97.0 FL    MCH 28.4 24.0 - 34.0 PG    MCHC 32.7 31.0 - 37.0 g/dL    RDW 14.3 11.6 - 14.5 %    PLATELET 939 742 - 157 K/uL    MPV 10.5 9.2 - 11.8 FL    NEUTROPHILS 57 40 - 73 %    LYMPHOCYTES 33 21 - 52 %    MONOCYTES 8 3 - 10 %    EOSINOPHILS 2 0 - 5 %    BASOPHILS 0 0 - 2 %    ABS. NEUTROPHILS 2.9 1.8 - 8.0 K/UL    ABS. LYMPHOCYTES 1.7 0.9 - 3.6 K/UL    ABS. MONOCYTES 0.4 0.05 - 1.2 K/UL    ABS. EOSINOPHILS 0.1 0.0 - 0.4 K/UL    ABS. BASOPHILS 0.0 0.0 - 0.06 K/UL    DF AUTOMATED     METABOLIC PANEL, COMPREHENSIVE    Collection Time: 06/20/17  2:38 PM   Result Value Ref Range    Sodium 145 136 - 145 mmol/L    Potassium 4.0 3.5 - 5.5 mmol/L    Chloride 110 (H) 100 - 108 mmol/L    CO2 29 21 - 32 mmol/L    Anion gap 6 3.0 - 18 mmol/L    Glucose 97 74 - 99 mg/dL    BUN 16 7.0 - 18 MG/DL    Creatinine 1.36 (H) 0.6 - 1.3 MG/DL    BUN/Creatinine ratio 12 12 - 20      GFR est AA 49 (L) >60 ml/min/1.73m2    GFR est non-AA 40 (L) >60 ml/min/1.73m2    Calcium 8.8 8.5 - 10.1 MG/DL    Bilirubin, total 0.3 0.2 - 1.0 MG/DL    ALT (SGPT) 25 13 - 56 U/L    AST (SGOT) 14 (L) 15 - 37 U/L    Alk.  phosphatase 127 (H) 45 - 117 U/L Protein, total 6.8 6.4 - 8.2 g/dL    Albumin 3.2 (L) 3.4 - 5.0 g/dL    Globulin 3.6 2.0 - 4.0 g/dL    A-G Ratio 0.9 0.8 - 1.7     LIPASE    Collection Time: 06/20/17  2:38 PM   Result Value Ref Range    Lipase 179 73 - 393 U/L   POC LACTIC ACID    Collection Time: 06/20/17  4:17 PM   Result Value Ref Range    Lactic Acid (POC) 2.1 (HH) 0.4 - 2.0 mmol/L         DISCHARGE NOTE:  7:49 PM    Rhonda Damian's  results have been reviewed with her. She has been counseled regarding her diagnosis, treatment, and plan. She verbally conveys understanding and agreement of the signs, symptoms, diagnosis, treatment and prognosis and additionally agrees to follow up as discussed. She also agrees with the care-plan and conveys that all of her questions have been answered. I have also provided discharge instructions for her that include: educational information regarding their diagnosis and treatment, and list of reasons why they would want to return to the ED prior to their follow-up appointment, should her condition change. CLINICAL IMPRESSION:    1. Abdominal pain, generalized    2. Hx SBO    3. Adrenal cyst ([address]        AFTER VISIT PLAN:    Current Discharge Medication List      START taking these medications    Details   oxyCODONE-acetaminophen (PERCOCET) 5-325 mg per tablet Take 1 tablet every 4-6 hours as needed for pain control. If you were instructed to try over the counter ibuprofen or tylenol, only take the percocet for pain not controlled with the over the counter medication. Qty: 12 Tab, Refills: 0         STOP taking these medications       HYDROcodone-acetaminophen (NORCO) 5-325 mg per tablet Comments:   Reason for Stopping:                 Follow-up Information     None           Written by Lillia Goldmann PA-C

## 2017-06-21 ENCOUNTER — DOCUMENTATION ONLY (OUTPATIENT)
Dept: OBGYN CLINIC | Age: 57
End: 2017-06-21

## 2017-06-21 NOTE — PROGRESS NOTES
[facility] called to confirm they scheduled the pt per referral.  She is scheduled for a Women's Health Evaluation with Eron Benitez on 8/8/17.

## 2017-06-21 NOTE — ED NOTES
I have reviewed discharge instruction and prescriptions with patient. Patient verbalized understanding and has no further questions at this time. Education taught and patient verbalized understanding of education. Teach back method used. IV removed, catheter tip intact on removal.      Patients pain decreased. Belongings given to patient. Patient discharged with  to home.

## 2017-06-21 NOTE — DISCHARGE INSTRUCTIONS
Abdominal Pain: Care Instructions  Your Care Instructions    Abdominal pain has many possible causes. Some aren't serious and get better on their own in a few days. Others need more testing and treatment. If your pain continues or gets worse, you need to be rechecked and may need more tests to find out what is wrong. You may need surgery to correct the problem. Don't ignore new symptoms, such as fever, nausea and vomiting, urination problems, pain that gets worse, and dizziness. These may be signs of a more serious problem. Your doctor may have recommended a follow-up visit in the next 8 to 12 hours. If you are not getting better, you may need more tests or treatment. The doctor has checked you carefully, but problems can develop later. If you notice any problems or new symptoms, get medical treatment right away. Follow-up care is a key part of your treatment and safety. Be sure to make and go to all appointments, and call your doctor if you are having problems. It's also a good idea to know your test results and keep a list of the medicines you take. How can you care for yourself at home? · Rest until you feel better. · To prevent dehydration, drink plenty of fluids, enough so that your urine is light yellow or clear like water. Choose water and other caffeine-free clear liquids until you feel better. If you have kidney, heart, or liver disease and have to limit fluids, talk with your doctor before you increase the amount of fluids you drink. · If your stomach is upset, eat mild foods, such as rice, dry toast or crackers, bananas, and applesauce. Try eating several small meals instead of two or three large ones. · Wait until 48 hours after all symptoms have gone away before you have spicy foods, alcohol, and drinks that contain caffeine. · Do not eat foods that are high in fat. · Avoid anti-inflammatory medicines such as aspirin, ibuprofen (Advil, Motrin), and naproxen (Aleve).  These can cause stomach upset. Talk to your doctor if you take daily aspirin for another health problem. When should you call for help? Call 911 anytime you think you may need emergency care. For example, call if:  · You passed out (lost consciousness). · You pass maroon or very bloody stools. · You vomit blood or what looks like coffee grounds. · You have new, severe belly pain. Call your doctor now or seek immediate medical care if:  · Your pain gets worse, especially if it becomes focused in one area of your belly. · You have a new or higher fever. · Your stools are black and look like tar, or they have streaks of blood. · You have unexpected vaginal bleeding. · You have symptoms of a urinary tract infection. These may include:  ¨ Pain when you urinate. ¨ Urinating more often than usual.  ¨ Blood in your urine. · You are dizzy or lightheaded, or you feel like you may faint. Watch closely for changes in your health, and be sure to contact your doctor if:  · You are not getting better after 1 day (24 hours). Where can you learn more? Go to http://betzyStatSheetdidi.info/. Enter K130 in the search box to learn more about \"Abdominal Pain: Care Instructions. \"  Current as of: March 20, 2017  Content Version: 11.3  © 6096-0291 [employer]. Care instructions adapted under license by [employer] (which disclaims liability or warranty for this information). If you have questions about a medical condition or this instruction, always ask your healthcare professional. [place] [address] any warranty or liability for your use of this information. Cam[employer]i Activation    Thank you for requesting access to Cam[employer]. Please follow the instructions below to securely access and download your online medical record. Cam[employer]i allows you to send messages to your doctor, view your test results, renew your prescriptions, schedule appointments, and more. How Do I Sign Up? 1.  In your internet browser, go to [email or web address].[email or web address]. [employer]  2. Click on the First Time User? Click Here link in the Sign In box. You will be redirect to the New Member Sign Up page. 3. Enter your VISENZE Access Code exactly as it appears below. You will not need to use this code after youve completed the sign-up process. If you do not sign up before the expiration date, you must request a new code. VISENZE Access Code: [email or web address]  Expires: 2017 10:48 AM (This is the date your VISENZE access code will )    4. Enter the last four digits of your Social Security Number (xxxx) and Date of Birth (mm/dd/yyyy) as indicated and click Submit. You will be taken to the next sign-up page. 5. Create a VISENZE ID. This will be your VISENZE login ID and cannot be changed, so think of one that is secure and easy to remember. 6. Create a VISENZE password. You can change your password at any time. 7. Enter your Password Reset Question and Answer. This can be used at a later time if you forget your password. 8. Enter your e-mail address. You will receive e-mail notification when new information is available in [address] 9. Click Sign Up. You can now view and download portions of your medical record. 10. Click the Download Summary menu link to download a portable copy of your medical information. Additional Information    If you have questions, please visit the Frequently Asked Questions section of the VISENZE website at [email or web address] Remember, VISENZE is NOT to be used for urgent needs. For medical emergencies, dial 911.

## 2017-06-26 ENCOUNTER — HOSPITAL ENCOUNTER (EMERGENCY)
Age: 57
Discharge: HOME | End: 2017-06-26
Attending: EMERGENCY MEDICINE | Admitting: EMERGENCY MEDICINE
Payer: COMMERCIAL

## 2017-06-26 ENCOUNTER — APPOINTMENT (OUTPATIENT)
Dept: GENERAL RADIOLOGY | Age: 57
End: 2017-06-26
Attending: EMERGENCY MEDICINE
Payer: COMMERCIAL

## 2017-06-26 VITALS
BODY MASS INDEX: 25.61 KG/M2 | HEART RATE: 70 BPM | WEIGHT: 150 LBS | DIASTOLIC BLOOD PRESSURE: 81 MMHG | SYSTOLIC BLOOD PRESSURE: 114 MMHG | RESPIRATION RATE: 16 BRPM | TEMPERATURE: 99.4 F | OXYGEN SATURATION: 100 % | HEIGHT: 64 IN

## 2017-06-26 DIAGNOSIS — Z87.898 HX OF ABDOMINAL PAIN: ICD-10-CM

## 2017-06-26 DIAGNOSIS — R10.84 ABDOMINAL PAIN, GENERALIZED: Primary | ICD-10-CM

## 2017-06-26 LAB
ALBUMIN SERPL BCP-MCNC: 3.6 G/DL (ref 3.4–5)
ALBUMIN/GLOB SERPL: 0.9 {RATIO} (ref 0.8–1.7)
ALP SERPL-CCNC: 141 U/L (ref 45–117)
ALT SERPL-CCNC: 40 U/L (ref 13–56)
ANION GAP BLD CALC-SCNC: 5 MMOL/L (ref 3–18)
APPEARANCE UR: CLEAR
AST SERPL W P-5'-P-CCNC: 21 U/L (ref 15–37)
BACTERIA URNS QL MICRO: NEGATIVE /HPF
BASOPHILS # BLD AUTO: 0 K/UL (ref 0–0.06)
BASOPHILS # BLD: 0 % (ref 0–2)
BILIRUB SERPL-MCNC: 0.2 MG/DL (ref 0.2–1)
BILIRUB UR QL: NEGATIVE
BUN SERPL-MCNC: 20 MG/DL (ref 7–18)
BUN/CREAT SERPL: 14 (ref 12–20)
CALCIUM SERPL-MCNC: 9.4 MG/DL (ref 8.5–10.1)
CHLORIDE SERPL-SCNC: 105 MMOL/L (ref 100–108)
CO2 SERPL-SCNC: 30 MMOL/L (ref 21–32)
COLOR UR: YELLOW
CREAT SERPL-MCNC: 1.45 MG/DL (ref 0.6–1.3)
DIFFERENTIAL METHOD BLD: NORMAL
EOSINOPHIL # BLD: 0.2 K/UL (ref 0–0.4)
EOSINOPHIL NFR BLD: 3 % (ref 0–5)
EPITH CASTS URNS QL MICRO: ABNORMAL /LPF (ref 0–5)
ERYTHROCYTE [DISTWIDTH] IN BLOOD BY AUTOMATED COUNT: 14.2 % (ref 11.6–14.5)
GLOBULIN SER CALC-MCNC: 3.8 G/DL (ref 2–4)
GLUCOSE SERPL-MCNC: 136 MG/DL (ref 74–99)
GLUCOSE UR STRIP.AUTO-MCNC: >1000 MG/DL
HCT VFR BLD AUTO: 38.6 % (ref 35–45)
HGB BLD-MCNC: 12.6 G/DL (ref 12–16)
HGB UR QL STRIP: ABNORMAL
KETONES UR QL STRIP.AUTO: NEGATIVE MG/DL
LEUKOCYTE ESTERASE UR QL STRIP.AUTO: NEGATIVE
LYMPHOCYTES # BLD AUTO: 34 % (ref 21–52)
LYMPHOCYTES # BLD: 2.2 K/UL (ref 0.9–3.6)
MCH RBC QN AUTO: 28.4 PG (ref 24–34)
MCHC RBC AUTO-ENTMCNC: 32.6 G/DL (ref 31–37)
MCV RBC AUTO: 87.1 FL (ref 74–97)
MONOCYTES # BLD: 0.6 K/UL (ref 0.05–1.2)
MONOCYTES NFR BLD AUTO: 10 % (ref 3–10)
NEUTS SEG # BLD: 3.4 K/UL (ref 1.8–8)
NEUTS SEG NFR BLD AUTO: 53 % (ref 40–73)
NITRITE UR QL STRIP.AUTO: NEGATIVE
PH UR STRIP: 5.5 [PH] (ref 5–8)
PLATELET # BLD AUTO: 270 K/UL (ref 135–420)
PMV BLD AUTO: 10.6 FL (ref 9.2–11.8)
POTASSIUM SERPL-SCNC: 4.1 MMOL/L (ref 3.5–5.5)
PROT SERPL-MCNC: 7.4 G/DL (ref 6.4–8.2)
PROT UR STRIP-MCNC: NEGATIVE MG/DL
RBC # BLD AUTO: 4.43 M/UL (ref 4.2–5.3)
RBC #/AREA URNS HPF: ABNORMAL /HPF (ref 0–5)
SODIUM SERPL-SCNC: 140 MMOL/L (ref 136–145)
SP GR UR REFRACTOMETRY: 1.02 (ref 1–1.03)
UROBILINOGEN UR QL STRIP.AUTO: 0.2 EU/DL (ref 0.2–1)
WBC # BLD AUTO: 6.4 K/UL (ref 4.6–13.2)
WBC URNS QL MICRO: ABNORMAL /HPF (ref 0–4)
YEAST URNS QL MICRO: ABNORMAL

## 2017-06-26 PROCEDURE — 80053 COMPREHEN METABOLIC PANEL: CPT | Performed by: EMERGENCY MEDICINE

## 2017-06-26 PROCEDURE — 81001 URINALYSIS AUTO W/SCOPE: CPT | Performed by: EMERGENCY MEDICINE

## 2017-06-26 PROCEDURE — 99284 EMERGENCY DEPT VISIT MOD MDM: CPT

## 2017-06-26 PROCEDURE — 85025 COMPLETE CBC W/AUTO DIFF WBC: CPT | Performed by: EMERGENCY MEDICINE

## 2017-06-26 PROCEDURE — 74022 RADEX COMPL AQT ABD SERIES: CPT

## 2017-06-26 PROCEDURE — 74011250637 HC RX REV CODE- 250/637: Performed by: PHYSICIAN ASSISTANT

## 2017-06-26 PROCEDURE — 51798 US URINE CAPACITY MEASURE: CPT

## 2017-06-26 RX ORDER — OXYCODONE AND ACETAMINOPHEN 5; 325 MG/1; MG/1
1 TABLET ORAL
Status: COMPLETED | OUTPATIENT
Start: 2017-06-26 | End: 2017-06-26

## 2017-06-26 RX ADMIN — OXYCODONE HYDROCHLORIDE AND ACETAMINOPHEN 1 TABLET: 5; 325 TABLET ORAL at 18:16

## 2017-06-26 NOTE — DISCHARGE INSTRUCTIONS
Abdominal Pain: Care Instructions  Your Care Instructions    Abdominal pain has many possible causes. Some aren't serious and get better on their own in a few days. Others need more testing and treatment. If your pain continues or gets worse, you need to be rechecked and may need more tests to find out what is wrong. You may need surgery to correct the problem. Don't ignore new symptoms, such as fever, nausea and vomiting, urination problems, pain that gets worse, and dizziness. These may be signs of a more serious problem. Your doctor may have recommended a follow-up visit in the next 8 to 12 hours. If you are not getting better, you may need more tests or treatment. The doctor has checked you carefully, but problems can develop later. If you notice any problems or new symptoms, get medical treatment right away. Follow-up care is a key part of your treatment and safety. Be sure to make and go to all appointments, and call your doctor if you are having problems. It's also a good idea to know your test results and keep a list of the medicines you take. How can you care for yourself at home? · Rest until you feel better. · To prevent dehydration, drink plenty of fluids, enough so that your urine is light yellow or clear like water. Choose water and other caffeine-free clear liquids until you feel better. If you have kidney, heart, or liver disease and have to limit fluids, talk with your doctor before you increase the amount of fluids you drink. · If your stomach is upset, eat mild foods, such as rice, dry toast or crackers, bananas, and applesauce. Try eating several small meals instead of two or three large ones. · Wait until 48 hours after all symptoms have gone away before you have spicy foods, alcohol, and drinks that contain caffeine. · Do not eat foods that are high in fat. · Avoid anti-inflammatory medicines such as aspirin, ibuprofen (Advil, Motrin), and naproxen (Aleve).  These can cause stomach upset. Talk to your doctor if you take daily aspirin for another health problem. When should you call for help? Call 911 anytime you think you may need emergency care. For example, call if:  · You passed out (lost consciousness). · You pass maroon or very bloody stools. · You vomit blood or what looks like coffee grounds. · You have new, severe belly pain. Call your doctor now or seek immediate medical care if:  · Your pain gets worse, especially if it becomes focused in one area of your belly. · You have a new or higher fever. · Your stools are black and look like tar, or they have streaks of blood. · You have unexpected vaginal bleeding. · You have symptoms of a urinary tract infection. These may include:  ¨ Pain when you urinate. ¨ Urinating more often than usual.  ¨ Blood in your urine. · You are dizzy or lightheaded, or you feel like you may faint. Watch closely for changes in your health, and be sure to contact your doctor if:  · You are not getting better after 1 day (24 hours). Where can you learn more? Go to http://betzy-didi.info/. Enter S882 in the search box to learn more about \"Abdominal Pain: Care Instructions. \"  Current as of: March 20, 2017  Content Version: 11.3  © 7668-4107 [employer]. Care instructions adapted under license by [employer] (which disclaims liability or warranty for this information). If you have questions about a medical condition or this instruction, always ask your healthcare professional. [place] [address] any warranty or liability for your use of this information. [employer] Guides Activation    Thank you for requesting access to [employer]. Please follow the instructions below to securely access and download your online medical record. [employer] Guides allows you to send messages to your doctor, view your test results, renew your prescriptions, schedule appointments, and more. How Do I Sign Up? 1.  In your internet browser, go to www.[email or web address]. [employer]  2. Click on the First Time User? Click Here link in the Sign In box. You will be redirect to the New Member Sign Up page. 3. Enter your [employer] Access Code exactly as it appears below. You will not need to use this code after youve completed the sign-up process. If you do not sign up before the expiration date, you must request a new code. [employer] Access Code: [email or web address]  Expires: 2017 10:48 AM (This is the date your [employer] access code will )    4. Enter the last four digits of your Social Security Number (xxxx) and Date of Birth (mm/dd/yyyy) as indicated and click Submit. You will be taken to the next sign-up page. 5. Create a [employer] ID. This will be your [employer] login ID and cannot be changed, so think of one that is secure and easy to remember. 6. Create a [employer] password. You can change your password at any time. 7. Enter your Password Reset Question and Answer. This can be used at a later time if you forget your password. 8. Enter your e-mail address. You will receive e-mail notification when new information is available in [address] 9. Click Sign Up. You can now view and download portions of your medical record. 10. Click the Download Summary menu link to download a portable copy of your medical information. Additional Information    If you have questions, please visit the Frequently Asked Questions section of the [employer] website at [email or web address] Remember, [employer] is NOT to be used for urgent needs. For medical emergencies, dial 911.

## 2017-06-26 NOTE — ED PROVIDER NOTES
Patient is a 62 y.o. female presenting with abdominal pain and inability to urinate. Abdominal Pain    Pertinent negatives include no fever, no diarrhea, no nausea, no vomiting, no frequency, no hematuria, no chest pain and no back pain. Urinary Retention    Pertinent negatives include no fever, no diarrhea, no nausea, no vomiting, no frequency, no hematuria, no chest pain and no back pain. Bernie Fabry is a 62 y.o. female with hx of gastric bypass in the past with chronic abd pain presents with ongoing abd pain worsened today mostly R lower quad and was seen by \"gastroentrology earliery today and she told me to take bentyl and come to ER if pain worsens: Denies of any nausea, vomiting, diarrhea, fever, abd trauma or recent change in medication. She was seen last on June 20 2017 and underwent CT abd and pelvic with contrast with no acute finding.    Pt stated that she initially was having trouble urinating but was able to go \" just before I came to ER\"    Past Medical History:   Diagnosis Date    Abdominal adhesions     Anemia     Arthritis     all over the body    Asthma     Asthma     Diabetes ([facility] [address] [facility]     Diabetes mellitus     Dyskinesia     bilateral    Essential hypertension     GERD (gastroesophageal reflux disease)     Hypertension     Menopause     Stool color black        Past Surgical History:   Procedure Laterality Date    HX CHOLECYSTECTOMY  6/28/10    HX COLONOSCOPY      HX ENDOSCOPY      HX HERNIA REPAIR      HX HYSTERECTOMY      Fibroids    HX KNEE REPLACEMENT      HX OOPHORECTOMY  12/2000    HX SMALL BOWEL RESECTION  12/2000    HX SMALL BOWEL RESECTION  02/21/2017    Dr. Alberto Louis           Family History:   Problem Relation Age of Onset    Hypertension Other      parent    Breast Cancer Other 21    Heart Disease Father     Hypertension Father     Diabetes Father     Diabetes Mother     Hypertension Other      sibling    Diabetes Other      parent    Diabetes Sister     Kidney Disease Brother     Diabetes Brother        Social History     Social History    Marital status:      Spouse name: N/A    Number of children: N/A    Years of education: N/A     Occupational History    Not on file. Social History Main Topics    Smoking status: Never Smoker    Smokeless tobacco: Never Used    Alcohol use No    Drug use: No    Sexual activity: Yes     Partners: Male     Birth control/ protection: None     Other Topics Concern    Not on file     Social History Narrative         ALLERGIES: Macrodantin [nitrofurantoin macrocrystalline] and Tape [adhesive]    Review of Systems   Constitutional: Negative for chills, fatigue and fever. Respiratory: Negative for cough, chest tightness and shortness of breath. Cardiovascular: Negative for chest pain, palpitations and leg swelling. Gastrointestinal: Positive for abdominal pain. Negative for blood in stool, diarrhea, nausea and vomiting. Genitourinary: Negative for flank pain, frequency, hematuria and pelvic pain. Musculoskeletal: Negative for back pain. Vitals:    06/26/17 1655   BP: 114/81   Pulse: 70   Resp: 16   Temp: 99.4 °F (37.4 °C)   SpO2: 100%   Weight: 68 kg (150 lb)   Height: 5' 4\" (1.626 m)            Physical Exam   Constitutional: She appears well-developed and well-nourished. Cardiovascular: Normal rate, regular rhythm and normal heart sounds. Pulmonary/Chest: Effort normal and breath sounds normal. No respiratory distress. She has no wheezes. She has no rales. She exhibits no tenderness. Abdominal: Soft. Bowel sounds are normal. She exhibits no distension. There is no rebound and no guarding. Diffused TTP all quad        MDM  Number of Diagnoses or Management Options  Abdominal pain, generalized:   Hx of abdominal pain:   Diagnosis management comments: With recent CT abd and pelvis on 20 th with no acute finding will repeat CBC, CMP with [facility].    Spoke to Dr Julian Steen who recommended the above test. And if normal can d/c home   UA with no indication for UTI  CBC with no leukocytosis. CMP with   US Bladder with 45 cc urine. ED Course       Procedures    Recent Results (from the past 12 hour(s))   CBC WITH AUTOMATED DIFF    Collection Time: 06/26/17  5:00 PM   Result Value Ref Range    WBC 6.4 4.6 - 13.2 K/uL    RBC 4.43 4.20 - 5.30 M/uL    HGB 12.6 12.0 - 16.0 g/dL    HCT 38.6 35.0 - 45.0 %    MCV 87.1 74.0 - 97.0 FL    MCH 28.4 24.0 - 34.0 PG    MCHC 32.6 31.0 - 37.0 g/dL    RDW 14.2 11.6 - 14.5 %    PLATELET 203 252 - 481 K/uL    MPV 10.6 9.2 - 11.8 FL    NEUTROPHILS 53 40 - 73 %    LYMPHOCYTES 34 21 - 52 %    MONOCYTES 10 3 - 10 %    EOSINOPHILS 3 0 - 5 %    BASOPHILS 0 0 - 2 %    ABS. NEUTROPHILS 3.4 1.8 - 8.0 K/UL    ABS. LYMPHOCYTES 2.2 0.9 - 3.6 K/UL    ABS. MONOCYTES 0.6 0.05 - 1.2 K/UL    ABS. EOSINOPHILS 0.2 0.0 - 0.4 K/UL    ABS. BASOPHILS 0.0 0.0 - 0.06 K/UL    DF AUTOMATED     METABOLIC PANEL, COMPREHENSIVE    Collection Time: 06/26/17  5:00 PM   Result Value Ref Range    Sodium 140 136 - 145 mmol/L    Potassium 4.1 3.5 - 5.5 mmol/L    Chloride 105 100 - 108 mmol/L    CO2 30 21 - 32 mmol/L    Anion gap 5 3.0 - 18 mmol/L    Glucose 136 (H) 74 - 99 mg/dL    BUN 20 (H) 7.0 - 18 MG/DL    Creatinine 1.45 (H) 0.6 - 1.3 MG/DL    BUN/Creatinine ratio 14 12 - 20      GFR est AA 45 (L) >60 ml/min/1.73m2    GFR est non-AA 37 (L) >60 ml/min/1.73m2    Calcium 9.4 8.5 - 10.1 MG/DL    Bilirubin, total 0.2 0.2 - 1.0 MG/DL    ALT (SGPT) 40 13 - 56 U/L    AST (SGOT) 21 15 - 37 U/L    Alk.  phosphatase 141 (H) 45 - 117 U/L    Protein, total 7.4 6.4 - 8.2 g/dL    Albumin 3.6 3.4 - 5.0 g/dL    Globulin 3.8 2.0 - 4.0 g/dL    A-G Ratio 0.9 0.8 - 1.7     URINALYSIS W/ RFLX MICROSCOPIC    Collection Time: 06/26/17  5:26 PM   Result Value Ref Range    Color YELLOW      Appearance CLEAR      Specific gravity 1.023 1.005 - 1.030      pH (UA) 5.5 5.0 - 8.0      Protein NEGATIVE NEG mg/dL    Glucose >1000 (A) NEG mg/dL    Ketone NEGATIVE  NEG mg/dL    Bilirubin NEGATIVE  NEG      Blood MODERATE (A) NEG      Urobilinogen 0.2 0.2 - 1.0 EU/dL    Nitrites NEGATIVE  NEG      Leukocyte Esterase NEGATIVE  NEG     URINE MICROSCOPIC ONLY    Collection Time: 06/26/17  5:26 PM   Result Value Ref Range    WBC 0 to 3 0 - 4 /hpf    RBC 4 to 6 0 - 5 /hpf    Epithelial cells FEW 0 - 5 /lpf    Bacteria NEGATIVE  NEG /hpf    Yeast FEW (A) NEG           DISCHARGE NOTE:  6:36 PM    Hipolito Damian's  results have been reviewed with her. She has been counseled regarding her diagnosis, treatment, and plan. She verbally conveys understanding and agreement of the signs, symptoms, diagnosis, treatment and prognosis and additionally agrees to follow up as discussed. She also agrees with the care-plan and conveys that all of her questions have been answered. I have also provided discharge instructions for her that include: educational information regarding their diagnosis and treatment, and list of reasons why they would want to return to the ED prior to their follow-up appointment, should her condition change. CLINICAL IMPRESSION:    1. Abdominal pain, generalized    2. Hx of abdominal pain        AFTER VISIT PLAN:    Current Discharge Medication List           Follow-up Information     Follow up With Details Comments Contact Info    Júnior Felipe MD Schedule an appointment as soon as possible for a visit in 1 day  [address] [place] [address] [address] [place] [address]  [phone]      Gastroentrology   please follow up with your gastroentrology for further evaluation and treatment.             Written by Simona Mccoy PA-C

## 2017-06-28 ENCOUNTER — OFFICE VISIT (OUTPATIENT)
Dept: SURGERY | Age: 57
End: 2017-06-28

## 2017-06-28 VITALS
DIASTOLIC BLOOD PRESSURE: 91 MMHG | RESPIRATION RATE: 16 BRPM | TEMPERATURE: 96.8 F | BODY MASS INDEX: 26.43 KG/M2 | SYSTOLIC BLOOD PRESSURE: 140 MMHG | HEIGHT: 64 IN | HEART RATE: 75 BPM | OXYGEN SATURATION: 100 % | WEIGHT: 154.8 LBS

## 2017-06-28 DIAGNOSIS — R31.9 HEMATURIA: ICD-10-CM

## 2017-06-28 DIAGNOSIS — R10.84 GENERALIZED ABDOMINAL PAIN: Primary | ICD-10-CM

## 2017-06-28 NOTE — PATIENT INSTRUCTIONS
If you have any questions or concerns about today's appointment, the verbal and/or written instructions you were given for follow up care, please call our office at [phone].     [facility] - [address]    [phone] office  [phone] fax

## 2017-06-28 NOTE — TELEPHONE ENCOUNTER
Patient requesting the following medication refill     Medication requesting:    atorvastatin (Lipitor) 20mg tablet  Amlodipine (Norvasc) 2.5mg tablet    Date last prescribed Never Prescribed     QTY N/A Refills N/A     Date patient last seen 06/02/2017    Follow up appt scheduled for No scheduled appt    Please advise: Patient stated that her last PCP used to prescribe the medications so we should be handling them now.

## 2017-06-28 NOTE — PROGRESS NOTES
General Surgery Consult    Denise Crow  Admit date: (Not on file)    MRN: [id number]     : 1960     Age: 62 y.o. Attending Physician: Samantha Addison MD [facility]      History of Present Illness:      Denise Crow is a 62 y.o. female who is very well known to me. She is presenting for a CT scan showed a small cyst in the right pelvis. She has minimal abdominal pain. Passing gas and stool regularly. No fever or chills. Her CT scan showed no evidence of any bowel obstruction.  She also has hematuria for which she is going to see a urologist.    Patient Active Problem List    Diagnosis Date Noted    Abdominal pain 2017    Post-operative pain 2017    SBO (small bowel obstruction) ([address] 2017    Osteoarthritis of left knee 2016    Hypertension 2016    Hyperlipidemia 2016    GERD (gastroesophageal reflux disease) 2016    Asthma 2016    Type 2 diabetes mellitus ([address] 2016     Past Medical History:   Diagnosis Date    Abdominal adhesions     Anemia     Arthritis     all over the body    Asthma     Asthma     Diabetes ([address]     Diabetes mellitus     Dyskinesia     bilateral    Essential hypertension     GERD (gastroesophageal reflux disease)     Hypertension     Menopause     Stool color black       Past Surgical History:   Procedure Laterality Date    HX CHOLECYSTECTOMY  6/28/10    HX COLONOSCOPY      HX ENDOSCOPY      HX HERNIA REPAIR      HX HYSTERECTOMY      Fibroids    HX KNEE REPLACEMENT      HX OOPHORECTOMY  2000    HX SMALL BOWEL RESECTION  2000    HX SMALL BOWEL RESECTION  2017    Dr. Sanders Boeck        Social History   Substance Use Topics    Smoking status: Never Smoker    Smokeless tobacco: Never Used    Alcohol use No      History   Smoking Status    Never Smoker   Smokeless Tobacco    Never Used     Family History   Problem Relation Age of Onset    Hypertension Other      parent    Breast Cancer Other 21    Heart Disease Father     Hypertension Father     Diabetes Father     Diabetes Mother     Hypertension Other      sibling    Diabetes Other      parent    Diabetes Sister     Kidney Disease Brother     Diabetes Brother       Current Outpatient Prescriptions   Medication Sig    ondansetron hcl (ZOFRAN, AS HYDROCHLORIDE,) 4 mg tablet Take 1 Tab by mouth every eight (8) hours as needed for Nausea.  dapagliflozin (FARXIGA) 10 mg tab Take 1 Tab by mouth daily. Indications: type 2 diabetes mellitus    glucose blood VI test strips (ONETOUCH ULTRA TEST) strip Check blood glucose as directed    amLODIPine (NORVASC) 2.5 mg tablet Take 2.5 mg by mouth nightly.  bisoprolol-hydrochlorothiazide (ZIAC) 2.5-6.25 mg per tablet Take 1 Tab by mouth daily.  atorvastatin (LIPITOR) 20 mg tablet Take 40 mg by mouth daily.  omeprazole (PRILOSEC) 10 mg capsule Take 10 mg by mouth daily.  albuterol (PROVENTIL HFA, VENTOLIN HFA, PROAIR HFA) 90 mcg/actuation inhaler Take 1 Puff by inhalation every six (6) hours as needed for Wheezing.  metFORMIN (GLUMETZA) 500 mg TG24 24 hour tablet Take 500 mg by mouth two (2) times a day.  oxyCODONE-acetaminophen (PERCOCET) 5-325 mg per tablet Take 1 tablet every 4-6 hours as needed for pain control. If you were instructed to try over the counter ibuprofen or tylenol, only take the percocet for pain not controlled with the over the counter medication. No current facility-administered medications for this visit.        Allergies   Allergen Reactions    Macrodantin [Nitrofurantoin Macrocrystalline] Itching    Tape [Adhesive] Other (comments)     Burned skin when had wound vac          Review of Systems:  Constitutional: negative  Eyes: negative  Ears, Nose, Mouth, Throat, and Face: negative  Respiratory: negative  Cardiovascular: negative  Gastrointestinal: positive for abdominal pain  Genitourinary:positive for hematuria  Integument/Breast: negative  Hematologic/Lymphatic: negative  Musculoskeletal:negative  Neurological: negative  Behavioral/Psychiatric: negative    Objective:     Visit Vitals    BP (!) 140/91 (BP 1 Location: Right arm, BP Patient Position: Sitting)    Pulse 75    Temp 96.8 °F (36 °C) (Oral)    Resp 16    Ht 5' 4\" (1.626 m)    Wt 70.2 kg (154 lb 12.8 oz)    SpO2 100%    BMI 26.57 kg/m2       Physical Exam:      General:  in no apparent distress   Eyes:  conjunctivae and sclerae normal, pupils equal, round, reactive to light   Throat & Neck: no erythema or exudates noted and neck supple and symmetrical; no palpable masses   Lungs:   clear to auscultation bilaterally   Heart:  Regular rate and rhythm   Abdomen:   rounded, soft, nontender, nondistended, no masses or organomegaly. No hernias. Extremities: extremities normal, atraumatic, no cyanosis or edema   Skin: Normal.       Imaging and Lab Review:     CBC:   Lab Results   Component Value Date/Time    WBC 6.4 06/26/2017 05:00 PM    RBC 4.43 06/26/2017 05:00 PM    HGB 12.6 06/26/2017 05:00 PM    HCT 38.6 06/26/2017 05:00 PM    PLATELET 749 29/50/4280 05:00 PM     BMP:   Lab Results   Component Value Date/Time    Glucose 136 06/26/2017 05:00 PM    Sodium 140 06/26/2017 05:00 PM    Potassium 4.1 06/26/2017 05:00 PM    Chloride 105 06/26/2017 05:00 PM    CO2 30 06/26/2017 05:00 PM    BUN 20 06/26/2017 05:00 PM    Creatinine 1.45 06/26/2017 05:00 PM    Calcium 9.4 06/26/2017 05:00 PM     CMP:  Lab Results   Component Value Date/Time    Glucose 136 06/26/2017 05:00 PM    Sodium 140 06/26/2017 05:00 PM    Potassium 4.1 06/26/2017 05:00 PM    Chloride 105 06/26/2017 05:00 PM    CO2 30 06/26/2017 05:00 PM    BUN 20 06/26/2017 05:00 PM    Creatinine 1.45 06/26/2017 05:00 PM    Calcium 9.4 06/26/2017 05:00 PM    Anion gap 5 06/26/2017 05:00 PM    BUN/Creatinine ratio 14 06/26/2017 05:00 PM    Alk.  phosphatase 141 06/26/2017 05:00 PM    Protein, total 7.4 06/26/2017 05:00 PM    Albumin 3.6 06/26/2017 05:00 PM    Globulin 3.8 06/26/2017 05:00 PM    A-G Ratio 0.9 06/26/2017 05:00 PM       No results found for this or any previous visit (from the past 24 hour(s)). images and reports reviewed    Assessment:   Mariam Cronin is a 62 y.o. female is presenting with minimum abdomen pain and hematuria. I think the cyst in the right pelvis is benign and she would need to follow up with her OB/GYN. As for her hematuria she need to follow up with a urologist. No evidence of any bowel obstruction.      Plan:     No need for any general surgery intervention  Follow up with the GYN team  Followup with urology for hematuria    Please call me if you have any questions (cell phone: [phone])     Signed By: Thai Medina MD     June 28, 2017

## 2017-06-28 NOTE — MR AVS SNAPSHOT
Visit Information Date & Time Provider Department Dept. Phone Encounter #  
 6/28/2017  9:45 AM Britany Brizuela MD [facility] [phone] [id number] Your Appointments 7/21/2017 10:45 AM  
PROCEDURE with Teofilo Odonnell MD  
[facility]. Bruce Galloway [address] [place] [address]) Appt Note: Return for CT urogram, Cysto [address] [address]  
[phone]  
  
   
 [place] [address] Upcoming Health Maintenance Date Due Hepatitis C Screening 1960 Pneumococcal 19-64 Medium Risk (1 of 1 - PPSV23) 2/4/1979 DTaP/Tdap/Td series (1 - Tdap) 2/4/1981 PAP AKA CERVICAL CYTOLOGY 2/4/1981 INFLUENZA AGE 9 TO ADULT 8/1/2017 HEMOGLOBIN A1C Q6M 9/27/2017 FOOT EXAM Q1 3/27/2018 MICROALBUMIN Q1 3/27/2018 LIPID PANEL Q1 3/27/2018 EYE EXAM RETINAL OR DILATED Q1 5/29/2018 BREAST CANCER SCRN MAMMOGRAM 1/6/2019 COLONOSCOPY 3/3/2026 Allergies as of 6/28/2017  Review Complete On: 6/28/2017 By: Pranav Dickens Severity Noted Reaction Type Reactions Macrodantin [Nitrofurantoin Macrocrystalline]  01/31/2012    Itching Tape [Adhesive]  09/30/2014    Other (comments) Burned skin when had wound vac Current Immunizations  Reviewed on 2/22/2017 Name Date Influenza Vaccine 11/15/2016 Not reviewed this visit Vitals BP Pulse Temp Resp Height(growth percentile) Weight(growth percentile) (!) 140/91 (BP 1 Location: Right arm, BP Patient Position: Sitting) 75 96.8 °F (36 °C) (Oral) 16 5' 4\" (1.626 m) 154 lb 12.8 oz (70.2 kg) SpO2 BMI OB Status Smoking Status 100% 26.57 kg/m2 Hysterectomy Never Smoker BMI and BSA Data Body Mass Index Body Surface Area  
 26.57 kg/m 2 1.78 m 2 Preferred Pharmacy Pharmacy Name Phone [facility] [address]  [facility] [phone] Your Updated Medication List  
  
   
This list is accurate as of: 6/28/17 10:01 AM.  Always use your most recent med list.  
  
  
  
  
 albuterol 90 mcg/actuation inhaler Commonly known as:  PROVENTIL HFA, VENTOLIN HFA, PROAIR HFA Take 1 Puff by inhalation every six (6) hours as needed for Wheezing. amLODIPine 2.5 mg tablet Commonly known as:  [employer] Take 2.5 mg by mouth nightly. dapagliflozin 10 mg Tab Commonly known as:  [employer] Take 1 Tab by mouth daily. Indications: type 2 diabetes mellitus  
  
 glucose blood VI test strips strip Commonly known as:  ONETOUCH ULTRA TEST Check blood glucose as directed GLUMETZA 500 mg Tg24 24 hour tablet Generic drug:  metFORMIN Take 500 mg by mouth two (2) times a day. LIPITOR 20 mg tablet Generic drug:  atorvastatin Take 40 mg by mouth daily. omeprazole 10 mg capsule Commonly known as:  PRILOSEC Take 10 mg by mouth daily. ondansetron hcl 4 mg tablet Commonly known as:  ZOFRAN (AS HYDROCHLORIDE) Take 1 Tab by mouth every eight (8) hours as needed for Nausea. oxyCODONE-acetaminophen 5-325 mg per tablet Commonly known as:  PERCOCET Take 1 tablet every 4-6 hours as needed for pain control. If you were instructed to try over the counter ibuprofen or tylenol, only take the percocet for pain not controlled with the over the counter medication. ZIAC 2.5-6.25 mg per tablet Generic drug:  bisoprolol-hydroCHLOROthiazide Take 1 Tab by mouth daily. To-Do List   
 07/13/2017 9:45 AM  
  Appointment with [facility] RM 2 at [facility] ([phone]) DIET RESTRICTIONS   Nothing to eat or drink, 4 hours prior to study   May have water to take meds  OUTSIDE FILMS  - Any outside films related to the study being scheduled should be brought with you on the day of the exam.  If this cannot be done there may be a delay in the reading of the study. MEDICATIONS  - Patient must bring a complete list of all medications currently taking to include prescriptions, over-the-counter meds, herbals, vitamins & any dietary supplements 08/08/2017 9:00 AM  
  Appointment with Glendy Garza PT at [facility]  [facility],[address] ([phone]) Patient Instructions If you have any questions or concerns about today's appointment, the verbal and/or written instructions you were given for follow up care, please call our office at [phone]. [facility] [id number] [place] [address], [address] [facility], [address] [place] [address] Road 
 
[phone] office [phone] fax Introducing \[facility]\"" & HEALTH SERVICES! [facility] introduces [employer] patient portal. Now you can access parts of your medical record, email your doctor's office, and request medication refills online. 1. In your internet browser, go to [email or web address] 2. Click on the First Time User? Click Here link in the Sign In box. You will see the New Member Sign Up page. 3. Enter your [employer] Access Code exactly as it appears below. You will not need to use this code after youve completed the sign-up process. If you do not sign up before the expiration date, you must request a new code. · [employer] Access Code: [email or web address] Expires: 7/23/2017 10:48 AM 
 
4. Enter the last four digits of your Social Security Number (xxxx) and Date of Birth (mm/dd/yyyy) as indicated and click Submit. You will be taken to the next sign-up page. 5. Create a [employer]GN ([employer])t ID. This will be your [employer] login ID and cannot be changed, so think of one that is secure and easy to remember. 6. Create a [employer] password. You can change your password at any time. 7. Enter your Password Reset Question and Answer. This can be used at a later time if you forget your password. 8. Enter your e-mail address. You will receive e-mail notification when new information is available in [address] 9. Click Sign Up. You can now view and download portions of your medical record. 10. Click the Download Summary menu link to download a portable copy of your medical information. If you have questions, please visit the Frequently Asked Questions section of the [employer] website. Remember, [employer] is NOT to be used for urgent needs. For medical emergencies, dial 911. Now available from your iPhone and Android! Please provide this summary of care documentation to your next provider. Your primary care clinician is listed as Leon Jordan. If you have any questions after today's visit, please call [phone].

## 2017-06-28 NOTE — PROGRESS NOTES
Patient is a 62 y.o. female who presents for an ED follow up evaluation from a 06/20/17 visit at [facility]. She is seeking a second opinion on an adrenal mass seen on CT scan done through her ED visit. 1. Have you been to the ER, urgent care clinic since your last visit? Hospitalized since your last visit? Yes, [facility] for abdominal pain. 2. Have you seen or consulted any other health care providers outside of the [address] [place] [address] since your last visit? Include any pap smears or colon screening.  No

## 2017-06-28 NOTE — LETTER
6/28/2017 9:46 AM 
 
Patient:  Trever Jefferson YOB: 1960 Date of Visit: 6/28/2017 Aureliano Rutherford MD 
[address] [address] [address] [address] VIA In Basket Dear Aureliano Rutherford MD, Thank you for referring Ms. Tracey Alex to [address] [place] [address] for evaluation and treatment. Below are the relevant portions of my assessment and plan of care. Thank you very much for your referral of Ms. Tracey Alex. If you have questions, please do not hesitate to call me. I look forward to following Ms. Damian along with you and will keep you updated as to her progress. Sincerely, Freya Curtis MD

## 2017-06-29 ENCOUNTER — TELEPHONE (OUTPATIENT)
Dept: FAMILY MEDICINE CLINIC | Age: 57
End: 2017-06-29

## 2017-06-29 RX ORDER — ATORVASTATIN CALCIUM 20 MG/1
40 TABLET, FILM COATED ORAL DAILY
Qty: 90 TAB | Refills: 1 | Status: SHIPPED | OUTPATIENT
Start: 2017-06-29 | End: 2017-07-11 | Stop reason: SDUPTHER

## 2017-06-29 RX ORDER — AMLODIPINE BESYLATE 2.5 MG/1
2.5 TABLET ORAL
Qty: 90 TAB | Refills: 1 | Status: SHIPPED | OUTPATIENT
Start: 2017-06-29 | End: 2018-01-31 | Stop reason: SDUPTHER

## 2017-06-29 NOTE — TELEPHONE ENCOUNTER
Rx for Lipitor written for 20mg Take two tablets daily. Insurance won't cover. Wants 40mg  Lipitor once a day. Can this be changed?

## 2017-06-29 NOTE — TELEPHONE ENCOUNTER
Please see message below. Documentation only. Contacted the pharmacy and provided a verbal order to change the medication due to insurance purposes. Medication instructions were changed to Lipitor 40mg once per day.

## 2017-07-06 ENCOUNTER — HOSPITAL ENCOUNTER (EMERGENCY)
Age: 57
Discharge: HOME | End: 2017-07-06
Attending: EMERGENCY MEDICINE
Payer: COMMERCIAL

## 2017-07-06 VITALS
OXYGEN SATURATION: 96 % | DIASTOLIC BLOOD PRESSURE: 85 MMHG | RESPIRATION RATE: 16 BRPM | HEART RATE: 87 BPM | SYSTOLIC BLOOD PRESSURE: 132 MMHG | WEIGHT: 115 LBS | TEMPERATURE: 98.5 F | BODY MASS INDEX: 19.74 KG/M2

## 2017-07-06 DIAGNOSIS — M25.511 ACUTE PAIN OF BOTH SHOULDERS: Primary | ICD-10-CM

## 2017-07-06 DIAGNOSIS — M62.838 MUSCLE SPASM: ICD-10-CM

## 2017-07-06 DIAGNOSIS — M25.512 ACUTE PAIN OF BOTH SHOULDERS: Primary | ICD-10-CM

## 2017-07-06 PROCEDURE — 74011250637 HC RX REV CODE- 250/637: Performed by: NURSE PRACTITIONER

## 2017-07-06 PROCEDURE — 99283 EMERGENCY DEPT VISIT LOW MDM: CPT

## 2017-07-06 PROCEDURE — 96372 THER/PROPH/DIAG INJ SC/IM: CPT

## 2017-07-06 PROCEDURE — 74011250636 HC RX REV CODE- 250/636: Performed by: NURSE PRACTITIONER

## 2017-07-06 RX ORDER — CYCLOBENZAPRINE HCL 10 MG
10 TABLET ORAL
Qty: 12 TAB | Refills: 0 | Status: SHIPPED | OUTPATIENT
Start: 2017-07-06 | End: 2017-07-21

## 2017-07-06 RX ORDER — KETOROLAC TROMETHAMINE 30 MG/ML
30 INJECTION, SOLUTION INTRAMUSCULAR; INTRAVENOUS
Status: COMPLETED | OUTPATIENT
Start: 2017-07-06 | End: 2017-07-06

## 2017-07-06 RX ORDER — CYCLOBENZAPRINE HCL 10 MG
10 TABLET ORAL
Status: COMPLETED | OUTPATIENT
Start: 2017-07-06 | End: 2017-07-06

## 2017-07-06 RX ADMIN — CYCLOBENZAPRINE HYDROCHLORIDE 10 MG: 10 TABLET, FILM COATED ORAL at 14:09

## 2017-07-06 RX ADMIN — KETOROLAC TROMETHAMINE 30 MG: 30 INJECTION, SOLUTION INTRAMUSCULAR; INTRAVENOUS at 14:09

## 2017-07-06 NOTE — ED PROVIDER NOTES
HPI Comments:   1:39 PM   62 y.o. female presents to ED C/O bilateral shoulder pain. Patient has a HX of anemia, ashtma, DM, HTN, arthritis, SBO resection, hysterectomy . Patient reports bilateral shoulder pain x 2 days. Patient reports noted bilateral shoulder pain after dancing for an extended period of time with her  at a party, patient reports shoulder are stiff and painful, worse on left than right. Patient denies falls, trauma, loss of sensation to arms. Patient has taken nothing today for pain. Patient denies smoking. Pt denies any other sxs or complaints. Written by Francois SOSA      The history is provided by the patient.  History limited by: No language barrier         Past Medical History:   Diagnosis Date    Abdominal adhesions     Anemia     Arthritis     all over the body    Asthma     Asthma     Diabetes ([facility] [address]     Diabetes mellitus     Dyskinesia     bilateral    Essential hypertension     GERD (gastroesophageal reflux disease)     Hypertension     Menopause     Stool color black        Past Surgical History:   Procedure Laterality Date    HX CHOLECYSTECTOMY  6/28/10    HX COLONOSCOPY      HX ENDOSCOPY      HX HERNIA REPAIR      HX HYSTERECTOMY      Fibroids    HX KNEE REPLACEMENT      HX OOPHORECTOMY  12/2000    HX SMALL BOWEL RESECTION  12/2000    HX SMALL BOWEL RESECTION  02/21/2017    Dr. Shira Aj           Family History:   Problem Relation Age of Onset    Hypertension Other      parent    Breast Cancer Other 21    Heart Disease Father     Hypertension Father     Diabetes Father     Diabetes Mother     Hypertension Other      sibling    Diabetes Other      parent    Diabetes Sister     Kidney Disease Brother     Diabetes Brother        Social History     Social History    Marital status:      Spouse name: N/A    Number of children: N/A    Years of education: N/A     Occupational History    Not on file.     Social History Main Topics    Smoking status: Never Smoker    Smokeless tobacco: Never Used    Alcohol use No    Drug use: No    Sexual activity: Yes     Partners: Male     Birth control/ protection: None     Other Topics Concern    Not on file     Social History Narrative         ALLERGIES: Macrodantin [nitrofurantoin macrocrystalline] and Tape [adhesive]    Review of Systems   Respiratory: Negative for shortness of breath. Gastrointestinal: Negative for abdominal distention, nausea and vomiting. Musculoskeletal: Positive for arthralgias and myalgias. Negative for back pain, neck pain and neck stiffness. Neurological: Positive for dizziness and headaches. Vitals:    07/06/17 1238   BP: 132/85   Pulse: 87   Resp: 16   Temp: 98.5 °F (36.9 °C)   SpO2: 96%   Weight: 52.2 kg (115 lb)            Physical Exam   Constitutional: She is oriented to person, place, and time. She appears well-developed and well-nourished. Patient tearful, appears uncomfortable   HENT:   Head: Normocephalic and atraumatic. Right Ear: External ear normal.   Left Ear: External ear normal.   Nose: Nose normal.   Eyes: Conjunctivae and EOM are normal.   Neck: Normal range of motion. Neck supple. Muscular tenderness present. No spinous process tenderness present. Cardiovascular: Normal rate, regular rhythm and normal heart sounds. Pulses:       Radial pulses are 2+ on the right side, and 2+ on the left side. Pulmonary/Chest: Effort normal and breath sounds normal. No respiratory distress. She has no wheezes. She has no rales. Musculoskeletal:        Right shoulder: She exhibits tenderness, bony tenderness and pain. She exhibits normal range of motion, no swelling, no effusion, no spasm, normal pulse and normal strength. Left shoulder: She exhibits decreased range of motion, tenderness, bony tenderness, pain and spasm. She exhibits no swelling, no effusion and normal pulse.         Left elbow: She exhibits decreased range of motion. She exhibits no swelling, no effusion, no deformity and no laceration. Arms:  Neurological: She is alert and oriented to person, place, and time. Coordination normal.   Skin: Skin is warm and dry. No rash noted. She is not diaphoretic. No erythema. No pallor. Nursing note and vitals reviewed. MDM  Number of Diagnoses or Management Options  Acute pain of both shoulders:   Muscle spasm:   Diagnosis management comments: Clinical Impression - bilateral shoulder pain, muscle spasm    MDM:  No indication for imaging, no trauma. Discussed decision for no emergent imagining. Patient has noted muscle spasm to left shoulder will treat with flexeril and shot of NSAID in department, 1/2 normal dose due to recent renal function. Patient educated she must follow-up with orthopedist for further evaluation. Concern for rotator injury on left shoulder. Will not put patient in sling, concern she will not follow instructions for ROM exercises, at risk for frozen shoulder. Referred to PCP as needed. Patient educated to return to the ED for any new or worsening symptoms. Questions Denied. ED Course       Procedures             RESULTS:    No orders to display       Labs Reviewed - No data to display    No results found for this or any previous visit (from the past 12 hour(s)). PROGRESS NOTE:   1:39 PM   Initial assessment completed. Written by Aida SOSA     DISCHARGE NOTE:  2:41 PM   Jose Casiano Rickie's  results have been reviewed with her. She has been counseled regarding her diagnosis, treatment, and plan. She verbally conveys understanding and agreement of the signs, symptoms, diagnosis, treatment and prognosis and additionally agrees to follow up as discussed. She also agrees with the care-plan and conveys that all of her questions have been answered.   I have also provided discharge instructions for her that include: educational information regarding their diagnosis and treatment, and list of reasons why they would want to return to the ED prior to their follow-up appointment, should her condition change. CLINICAL IMPRESSION:    1. Acute pain of both shoulders    2. Muscle spasm        AFTER VISIT PLAN:    Current Discharge Medication List      START taking these medications    Details   cyclobenzaprine (FLEXERIL) 10 mg tablet Take 1 Tab by mouth three (3) times daily as needed for Muscle Spasm(s).   Qty: 12 Tab, Refills: 0              Follow-up Information     Follow up With Details Comments Contact Info    Matthew Mccarthy MD Schedule an appointment as soon as possible for a visit in 3 days Further evaluation [id number] [address]      Danny Rojas MD Schedule an appointment as soon as possible for a visit in 3 days As needed [address] [facility]  [phone]             Written by Parker SOSA

## 2017-07-11 DIAGNOSIS — E78.2 MIXED HYPERLIPIDEMIA: Primary | Chronic | ICD-10-CM

## 2017-07-11 RX ORDER — ATORVASTATIN CALCIUM 40 MG/1
40 TABLET, FILM COATED ORAL
Qty: 90 TAB | Refills: 3 | Status: SHIPPED | OUTPATIENT
Start: 2017-07-11 | End: 2018-12-26

## 2017-07-13 ENCOUNTER — HOSPITAL ENCOUNTER (OUTPATIENT)
Dept: CT IMAGING | Age: 57
Discharge: HOME | End: 2017-07-13
Attending: PHYSICIAN ASSISTANT
Payer: COMMERCIAL

## 2017-07-13 ENCOUNTER — HOSPITAL ENCOUNTER (EMERGENCY)
Age: 57
Discharge: HOME | End: 2017-07-13
Attending: EMERGENCY MEDICINE | Admitting: EMERGENCY MEDICINE
Payer: COMMERCIAL

## 2017-07-13 VITALS
DIASTOLIC BLOOD PRESSURE: 97 MMHG | SYSTOLIC BLOOD PRESSURE: 140 MMHG | HEART RATE: 108 BPM | WEIGHT: 130 LBS | BODY MASS INDEX: 22.31 KG/M2 | TEMPERATURE: 98.1 F | RESPIRATION RATE: 16 BRPM | OXYGEN SATURATION: 97 %

## 2017-07-13 DIAGNOSIS — R00.0 TACHYCARDIA: ICD-10-CM

## 2017-07-13 DIAGNOSIS — R79.89 ELEVATED SERUM CREATININE: ICD-10-CM

## 2017-07-13 DIAGNOSIS — R03.0 ELEVATED BLOOD PRESSURE READING: ICD-10-CM

## 2017-07-13 DIAGNOSIS — R79.9 ELEVATED BUN: ICD-10-CM

## 2017-07-13 DIAGNOSIS — R31.29 MICROHEMATURIA: ICD-10-CM

## 2017-07-13 DIAGNOSIS — R73.9 HYPERGLYCEMIA: Primary | ICD-10-CM

## 2017-07-13 LAB
ANION GAP BLD CALC-SCNC: 11 MMOL/L (ref 3–18)
BASOPHILS # BLD AUTO: 0 K/UL (ref 0–0.06)
BASOPHILS # BLD: 0 % (ref 0–2)
BUN SERPL-MCNC: 36 MG/DL (ref 7–18)
BUN/CREAT SERPL: 26 (ref 12–20)
CALCIUM SERPL-MCNC: 9.5 MG/DL (ref 8.5–10.1)
CHLORIDE SERPL-SCNC: 99 MMOL/L (ref 100–108)
CO2 SERPL-SCNC: 26 MMOL/L (ref 21–32)
CREAT SERPL-MCNC: 1.38 MG/DL (ref 0.6–1.3)
CREAT UR-MCNC: 1.1 MG/DL (ref 0.6–1.3)
DIFFERENTIAL METHOD BLD: ABNORMAL
EOSINOPHIL # BLD: 0 K/UL (ref 0–0.4)
EOSINOPHIL NFR BLD: 0 % (ref 0–5)
ERYTHROCYTE [DISTWIDTH] IN BLOOD BY AUTOMATED COUNT: 13.9 % (ref 11.6–14.5)
GLUCOSE SERPL-MCNC: 274 MG/DL (ref 74–99)
HCT VFR BLD AUTO: 39.6 % (ref 35–45)
HGB BLD-MCNC: 13.8 G/DL (ref 12–16)
LYMPHOCYTES # BLD AUTO: 12 % (ref 21–52)
LYMPHOCYTES # BLD: 1.5 K/UL (ref 0.9–3.6)
MCH RBC QN AUTO: 29.7 PG (ref 24–34)
MCHC RBC AUTO-ENTMCNC: 34.8 G/DL (ref 31–37)
MCV RBC AUTO: 85.3 FL (ref 74–97)
MONOCYTES # BLD: 0.8 K/UL (ref 0.05–1.2)
MONOCYTES NFR BLD AUTO: 7 % (ref 3–10)
NEUTS SEG # BLD: 9.4 K/UL (ref 1.8–8)
NEUTS SEG NFR BLD AUTO: 81 % (ref 40–73)
PLATELET # BLD AUTO: 314 K/UL (ref 135–420)
PMV BLD AUTO: 10.3 FL (ref 9.2–11.8)
POTASSIUM SERPL-SCNC: 4.4 MMOL/L (ref 3.5–5.5)
RBC # BLD AUTO: 4.64 M/UL (ref 4.2–5.3)
SODIUM SERPL-SCNC: 136 MMOL/L (ref 136–145)
WBC # BLD AUTO: 11.7 K/UL (ref 4.6–13.2)

## 2017-07-13 PROCEDURE — 80048 BASIC METABOLIC PNL TOTAL CA: CPT | Performed by: NURSE PRACTITIONER

## 2017-07-13 PROCEDURE — 74011250637 HC RX REV CODE- 250/637: Performed by: NURSE PRACTITIONER

## 2017-07-13 PROCEDURE — 85025 COMPLETE CBC W/AUTO DIFF WBC: CPT | Performed by: NURSE PRACTITIONER

## 2017-07-13 PROCEDURE — 74178 CT ABD&PLV WO CNTR FLWD CNTR: CPT

## 2017-07-13 PROCEDURE — 99283 EMERGENCY DEPT VISIT LOW MDM: CPT

## 2017-07-13 PROCEDURE — 82565 ASSAY OF CREATININE: CPT

## 2017-07-13 PROCEDURE — 74011250636 HC RX REV CODE- 250/636: Performed by: EMERGENCY MEDICINE

## 2017-07-13 PROCEDURE — 96360 HYDRATION IV INFUSION INIT: CPT

## 2017-07-13 PROCEDURE — 74011636320 HC RX REV CODE- 636/320: Performed by: PHYSICIAN ASSISTANT

## 2017-07-13 RX ORDER — OXYCODONE AND ACETAMINOPHEN 5; 325 MG/1; MG/1
1 TABLET ORAL
Qty: 6 TAB | Refills: 0 | Status: SHIPPED | OUTPATIENT
Start: 2017-07-13 | End: 2017-08-01 | Stop reason: SDUPTHER

## 2017-07-13 RX ORDER — OXYCODONE AND ACETAMINOPHEN 5; 325 MG/1; MG/1
1 TABLET ORAL
Status: COMPLETED | OUTPATIENT
Start: 2017-07-13 | End: 2017-07-13

## 2017-07-13 RX ADMIN — SODIUM CHLORIDE 1000 ML: 900 INJECTION, SOLUTION INTRAVENOUS at 13:35

## 2017-07-13 RX ADMIN — IOPAMIDOL 100 ML: 612 INJECTION, SOLUTION INTRAVENOUS at 10:34

## 2017-07-13 RX ADMIN — OXYCODONE HYDROCHLORIDE AND ACETAMINOPHEN 1 TABLET: 5; 325 TABLET ORAL at 14:20

## 2017-07-13 NOTE — ED PROVIDER NOTES
HPI Comments: Avila Schmidt is a 62year old female who presents to the ED with a c/o abdominal pain. States she was here for a CT scan of her kidneys, and felt pain again after the exam was done. Pt has been seen many times recently for her abdominal pain. Admits that she had abdominal surgery for a bowel resection this past February, and has had pain since. Pt has had plain film imagine of the abdomen on 06-26-17, and a CT of the abdomen on 06-20-17 - results reviewed. Pt concludes by saying she feels dehydrated. The history is provided by the patient. History limited by: No communication barrier. Past Medical History:   Diagnosis Date    Abdominal adhesions     Anemia     Arthritis     all over the body    Asthma     Asthma     Diabetes ([address]     Diabetes mellitus     Dyskinesia     bilateral    Essential hypertension     GERD (gastroesophageal reflux disease)     Hypertension     Menopause     Stool color black        Past Surgical History:   Procedure Laterality Date    HX CHOLECYSTECTOMY  6/28/10    HX COLONOSCOPY      HX ENDOSCOPY      HX HERNIA REPAIR      HX HYSTERECTOMY      Fibroids    HX KNEE REPLACEMENT      HX OOPHORECTOMY  12/2000    HX SMALL BOWEL RESECTION  12/2000    HX SMALL BOWEL RESECTION  02/21/2017    Dr. Diana Rubin           Family History:   Problem Relation Age of Onset    Hypertension Other      parent    Breast Cancer Other 21    Heart Disease Father     Hypertension Father     Diabetes Father     Diabetes Mother     Hypertension Other      sibling    Diabetes Other      parent    Diabetes Sister     Kidney Disease Brother     Diabetes Brother        Social History     Social History    Marital status:      Spouse name: N/A    Number of children: N/A    Years of education: N/A     Occupational History    Not on file.      Social History Main Topics    Smoking status: Never Smoker    Smokeless tobacco: Never Used    Alcohol use No    Drug use: No    Sexual activity: Yes     Partners: Male     Birth control/ protection: None     Other Topics Concern    Not on file     Social History Narrative         ALLERGIES: Macrodantin [nitrofurantoin macrocrystalline] and Tape [adhesive]    Review of Systems   Constitutional: Negative. HENT: Negative. Eyes: Negative. Respiratory: Negative. Cardiovascular: Negative. Gastrointestinal: Positive for abdominal pain. Endocrine: Negative. Genitourinary: Negative. Musculoskeletal: Negative. Skin: Negative. Allergic/Immunologic: Negative. Neurological: Negative. Hematological: Negative. Psychiatric/Behavioral: Negative. There were no vitals filed for this visit. Physical Exam   Constitutional: She is oriented to person, place, and time. She appears well-developed and well-nourished. No distress. HENT:   Head: Normocephalic and atraumatic. Eyes: EOM are normal. Pupils are equal, round, and reactive to light. Neck: Normal range of motion. Neck supple. Cardiovascular: Normal rate, regular rhythm and normal heart sounds. Pulmonary/Chest: Effort normal. No respiratory distress. She has no wheezes. She has no rales. Abdominal: Soft. Bowel sounds are normal. She exhibits distension. There is no tenderness. There is no rebound. Genitourinary:   Genitourinary Comments: NE   Musculoskeletal: Normal range of motion. Neurological: She is alert and oriented to person, place, and time. No cranial nerve deficit. She exhibits normal muscle tone. Coordination normal.   Skin: Skin is warm and dry. Psychiatric: She has a normal mood and affect. Nursing note and vitals reviewed. MDM  Number of Diagnoses or Management Options  Elevated blood pressure reading:   Elevated BUN:   Elevated serum creatinine:   Hyperglycemia:    Tachycardia:   Diagnosis management comments: PROGRESS NOTE:  ELEVATED SERUM CREATININE AND bun NOTED. THIS APPEARS TO BE A CHRONIC PROBLEM IN REVIEW OF THE EMR. Pt blood sugar is also elevated. Ester Singh NP  2:07 PM           Amount and/or Complexity of Data Reviewed  Clinical lab tests: ordered and reviewed    Risk of Complications, Morbidity, and/or Mortality  Presenting problems: low  Diagnostic procedures: low  Management options: low      ED Course       Procedures    PROGRESS NOTE:    One or more blood pressure readings were noted elevated during the Pt's presentation in the emergency department this date. This abnormal reading has been cited in the Pt's diagnosis, and they have been encouraged to follow up with their primary care physician, or referred to a consultant for further evaluation and treatment. Pt also has tacycardia. Ester Singh NP   2:03 PM      Diagnosis:   1. Hyperglycemia    2. Elevated BUN    3. Elevated serum creatinine    4. Elevated blood pressure reading    5. Tachycardia          Disposition:   Discharged to Home. Follow-up Information     None          Patient's Medications   Start Taking    OXYCODONE-ACETAMINOPHEN (PERCOCET) 5-325 MG PER TABLET    Take 1 Tab by mouth every six (6) hours as needed for Pain. Max Daily Amount: 4 Tabs. Continue Taking    ALBUTEROL (PROVENTIL HFA, VENTOLIN HFA, PROAIR HFA) 90 MCG/ACTUATION INHALER    Take 1 Puff by inhalation every six (6) hours as needed for Wheezing. AMLODIPINE (NORVASC) 2.5 MG TABLET    Take 1 Tab by mouth nightly. ATORVASTATIN (LIPITOR) 40 MG TABLET    Take 1 Tab by mouth nightly. BISOPROLOL-HYDROCHLOROTHIAZIDE (ZIAC) 2.5-6.25 MG PER TABLET    Take 1 Tab by mouth daily. CYCLOBENZAPRINE (FLEXERIL) 10 MG TABLET    Take 1 Tab by mouth three (3) times daily as needed for Muscle Spasm(s). DAPAGLIFLOZIN (FARXIGA) 10 MG TAB    Take 1 Tab by mouth daily.  Indications: type 2 diabetes mellitus    GLUCOSE BLOOD VI TEST STRIPS (ONETOUCH ULTRA TEST) STRIP    Check blood glucose as directed    METFORMIN (GLUMETZA) 500 MG TG24 24 HOUR TABLET    Take 500 mg by mouth two (2) times a day. OMEPRAZOLE (PRILOSEC) 10 MG CAPSULE    Take 10 mg by mouth daily. ONDANSETRON HCL (ZOFRAN, AS HYDROCHLORIDE,) 4 MG TABLET    Take 1 Tab by mouth every eight (8) hours as needed for Nausea. These Medications have changed    No medications on file   Stop Taking    OXYCODONE-ACETAMINOPHEN (PERCOCET) 5-325 MG PER TABLET    Take 1 tablet every 4-6 hours as needed for pain control. If you were instructed to try over the counter ibuprofen or tylenol, only take the percocet for pain not controlled with the over the counter medication.

## 2017-07-13 NOTE — DISCHARGE INSTRUCTIONS
Elevated Blood Pressure: Care Instructions  Your Care Instructions    Blood pressure is a measure of how hard the blood pushes against the walls of your arteries. It's normal for blood pressure to go up and down throughout the day. But if it stays up over time, you have high blood pressure. Two numbers tell you your blood pressure. The first number is the systolic pressure. It shows how hard the blood pushes when your heart is pumping. The second number is the diastolic pressure. It shows how hard the blood pushes between heartbeats, when your heart is relaxed and filling with blood. An ideal blood pressure in adults is less than 120/80 (say \"120 over 80\"). High blood pressure is 140/90 or higher. You have high blood pressure if your top number is 140 or higher or your bottom number is 90 or higher, or both. The main test for high blood pressure is simple, fast, and painless. To diagnose high blood pressure, your doctor will test your blood pressure at different times. After testing your blood pressure, your doctor may ask you to test it again when you are home. If you are diagnosed with high blood pressure, you can work with your doctor to make a long-term plan to manage it. Follow-up care is a key part of your treatment and safety. Be sure to make and go to all appointments, and call your doctor if you are having problems. It's also a good idea to know your test results and keep a list of the medicines you take. How can you care for yourself at home? · Do not smoke. Smoking increases your risk for heart attack and stroke. If you need help quitting, talk to your doctor about stop-smoking programs and medicines. These can increase your chances of quitting for good. · Stay at a healthy weight. · Try to limit how much sodium you eat to less than 2,300 milligrams (mg) a day. Your doctor may ask you to try to eat less than 1,500 mg a day. · Be physically active.  Get at least 30 minutes of exercise on most days of the week. Walking is a good choice. You also may want to do other activities, such as running, swimming, cycling, or playing tennis or team sports. · Avoid or limit alcohol. Talk to your doctor about whether you can drink any alcohol. · Eat plenty of fruits, vegetables, and low-fat dairy products. Eat less saturated and total fats. · Learn how to check your blood pressure at home. When should you call for help? Call your doctor now or seek immediate medical care if:  · Your blood pressure is much higher than normal (such as 180/110 or higher). · You think high blood pressure is causing symptoms such as:  ¨ Severe headache. ¨ Blurry vision. Watch closely for changes in your health, and be sure to contact your doctor if:  · You do not get better as expected. Where can you learn more? Go to http://betzyVindididi.info/. Enter C624 in the search box to learn more about \"Elevated Blood Pressure: Care Instructions. \"  Current as of: April 3, 2017  Content Version: 11.3  © 7016-9146 [employer]. Care instructions adapted under license by [employer] (which disclaims liability or warranty for this information). If you have questions about a medical condition or this instruction, always ask your healthcare professional. [address] any warranty or liability for your use of this information. Learning About High Blood Sugar  What is high blood sugar? Your body turns the food you eat into glucose (sugar), which it uses for energy. But if your body isn't able to use the sugar right away, it can build up in your blood and lead to high blood sugar. When the amount of sugar in your blood stays too high for too much of the time, you may have diabetes. Diabetes is a disease that can cause serious health problems. The good news is that lifestyle changes may help you get your blood sugar back to normal and avoid or delay diabetes.   What causes high blood sugar? Sugar (glucose) can build up in your blood if you:  · Are overweight. · Have a family history of diabetes. · Take certain medicines, such as steroids. What are the symptoms? Having high blood sugar may not cause any symptoms at all. Or it may make you feel very thirsty or very hungry. You may also urinate more often than usual, have blurry vision, or lose weight without trying. How is high blood sugar treated? You can take steps to lower your blood sugar level if you understand what makes it get higher. Your doctor may want you to learn how to test your blood sugar level at home. Then you can see how illness, stress, or different kinds of food or medicine raise or lower your blood sugar level. Other tests may be needed to see if you have diabetes. How can you prevent high blood sugar? · Watch your weight. If you're overweight, losing just a small amount of weight may help. Reducing fat around your waist is most important. · Limit the amount of calories, sweets, and unhealthy fat you eat. Ask your doctor if a dietitian can help you. A registered dietitian can help you create meal plans that fit your lifestyle. · Get at least 30 minutes of exercise on most days of the week. Exercise helps control your blood sugar. It also helps you maintain a healthy weight. Walking is a good choice. You also may want to do other activities, such as running, swimming, cycling, or playing tennis or team sports. · If your doctor prescribed medicines, take them exactly as prescribed. Call your doctor if you think you are having a problem with your medicine. You will get more details on the specific medicines your doctor prescribes. Follow-up care is a key part of your treatment and safety. Be sure to make and go to all appointments, and call your doctor if you are having problems. It's also a good idea to know your test results and keep a list of the medicines you take. Where can you learn more?   Go to http://betzy-didi.info/. Enter O108 in the search box to learn more about \"Learning About High Blood Sugar. \"  Current as of: 2017  Content Version: 11.3  © 1172-6492 [employer]. Care instructions adapted under license by [employer] (which disclaims liability or warranty for this information). If you have questions about a medical condition or this instruction, always ask your healthcare professional. [place] [address] any warranty or liability for your use of this information. GitHub Activation    Thank you for requesting access to GitHub. Please follow the instructions below to securely access and download your online medical record. GitHub allows you to send messages to your doctor, view your test results, renew your prescriptions, schedule appointments, and more. How Do I Sign Up? 1. In your internet browser, go to [email or web address]  2. Click on the First Time User? Click Here link in the Sign In box. You will be redirect to the New Member Sign Up page. 3. Enter your GitHub Access Code exactly as it appears below. You will not need to use this code after youve completed the sign-up process. If you do not sign up before the expiration date, you must request a new code. GitHub Access Code: [email or web address]  Expires: 2017 10:48 AM (This is the date your GitHub access code will )    4. Enter the last four digits of your Social Security Number (xxxx) and Date of Birth (mm/dd/yyyy) as indicated and click Submit. You will be taken to the next sign-up page. 5. Create a GitHub ID. This will be your GitHub login ID and cannot be changed, so think of one that is secure and easy to remember. 6. Create a GitHub password. You can change your password at any time. 7. Enter your Password Reset Question and Answer. This can be used at a later time if you forget your password. 8. Enter your e-mail address.  You will receive e-mail notification when new information is available in [address] 9. Click Sign Up. You can now view and download portions of your medical record. 10. Click the Download Summary menu link to download a portable copy of your medical information. Additional Information    If you have questions, please visit the Frequently Asked Questions section of the [employer] website at [email or web address] Remember, [employer] is NOT to be used for urgent needs. For medical emergencies, dial 911. Follow up with Dr Angel Jarvis for further evaluation and treatment.

## 2017-07-13 NOTE — ED NOTES
Patient states she had a CT done of her kidneys this morning and when they had her lay on her stomach she states her chronic pain became worse. Patient states she is also worried she is dehydrated.

## 2017-07-13 NOTE — ED NOTES
I have reviewed discharge instruction and prescriptions with patient. Patient verbalized understanding and has no further questions at this time. Education taught and patient verbalized understanding of education. Teach back method used. IV removed, catheter tip intact on removal.    Patients pain decreased. Belongings given to patient. Patient discharged with family to home. Patient states she will not be driving today.

## 2017-07-21 PROBLEM — G89.29 CHRONIC ABDOMINAL PAIN: Status: ACTIVE | Noted: 2017-03-14

## 2017-07-25 ENCOUNTER — HOSPITAL ENCOUNTER (EMERGENCY)
Age: 57
Discharge: HOME | End: 2017-07-25
Attending: EMERGENCY MEDICINE
Payer: COMMERCIAL

## 2017-07-25 VITALS
HEART RATE: 81 BPM | DIASTOLIC BLOOD PRESSURE: 82 MMHG | TEMPERATURE: 97.8 F | OXYGEN SATURATION: 100 % | WEIGHT: 150 LBS | SYSTOLIC BLOOD PRESSURE: 114 MMHG | RESPIRATION RATE: 13 BRPM | HEIGHT: 64 IN | BODY MASS INDEX: 25.61 KG/M2

## 2017-07-25 DIAGNOSIS — R10.13 ABDOMINAL PAIN, CHRONIC, EPIGASTRIC: ICD-10-CM

## 2017-07-25 DIAGNOSIS — G89.29 ABDOMINAL PAIN, CHRONIC, EPIGASTRIC: ICD-10-CM

## 2017-07-25 DIAGNOSIS — R10.13 RECURRENT EPIGASTRIC ABDOMINAL PAIN: Primary | ICD-10-CM

## 2017-07-25 LAB
ALBUMIN SERPL BCP-MCNC: 2.9 G/DL (ref 3.4–5)
ALBUMIN/GLOB SERPL: 0.8 {RATIO} (ref 0.8–1.7)
ALP SERPL-CCNC: 110 U/L (ref 45–117)
ALT SERPL-CCNC: 37 U/L (ref 13–56)
ANION GAP BLD CALC-SCNC: 11 MMOL/L (ref 3–18)
AST SERPL W P-5'-P-CCNC: 19 U/L (ref 15–37)
BASOPHILS # BLD AUTO: 0 K/UL (ref 0–0.06)
BASOPHILS # BLD: 0 % (ref 0–2)
BILIRUB DIRECT SERPL-MCNC: <0.1 MG/DL (ref 0–0.2)
BILIRUB SERPL-MCNC: 0.4 MG/DL (ref 0.2–1)
BUN SERPL-MCNC: 22 MG/DL (ref 7–18)
BUN/CREAT SERPL: 20 (ref 12–20)
CALCIUM SERPL-MCNC: 9 MG/DL (ref 8.5–10.1)
CHLORIDE SERPL-SCNC: 102 MMOL/L (ref 100–108)
CO2 SERPL-SCNC: 27 MMOL/L (ref 21–32)
CREAT SERPL-MCNC: 1.12 MG/DL (ref 0.6–1.3)
DIFFERENTIAL METHOD BLD: NORMAL
EOSINOPHIL # BLD: 0.1 K/UL (ref 0–0.4)
EOSINOPHIL NFR BLD: 1 % (ref 0–5)
ERYTHROCYTE [DISTWIDTH] IN BLOOD BY AUTOMATED COUNT: 14.1 % (ref 11.6–14.5)
GLOBULIN SER CALC-MCNC: 3.5 G/DL (ref 2–4)
GLUCOSE BLD STRIP.AUTO-MCNC: 133 MG/DL (ref 70–110)
GLUCOSE SERPL-MCNC: 114 MG/DL (ref 74–99)
HCT VFR BLD AUTO: 39.3 % (ref 35–45)
HGB BLD-MCNC: 13 G/DL (ref 12–16)
LIPASE SERPL-CCNC: 244 U/L (ref 73–393)
LYMPHOCYTES # BLD AUTO: 28 % (ref 21–52)
LYMPHOCYTES # BLD: 2.4 K/UL (ref 0.9–3.6)
MCH RBC QN AUTO: 28.8 PG (ref 24–34)
MCHC RBC AUTO-ENTMCNC: 33.1 G/DL (ref 31–37)
MCV RBC AUTO: 87.1 FL (ref 74–97)
MONOCYTES # BLD: 0.6 K/UL (ref 0.05–1.2)
MONOCYTES NFR BLD AUTO: 7 % (ref 3–10)
NEUTS SEG # BLD: 5.5 K/UL (ref 1.8–8)
NEUTS SEG NFR BLD AUTO: 64 % (ref 40–73)
PLATELET # BLD AUTO: 229 K/UL (ref 135–420)
PMV BLD AUTO: 10.2 FL (ref 9.2–11.8)
POTASSIUM SERPL-SCNC: 4.5 MMOL/L (ref 3.5–5.5)
PROT SERPL-MCNC: 6.4 G/DL (ref 6.4–8.2)
RBC # BLD AUTO: 4.51 M/UL (ref 4.2–5.3)
SODIUM SERPL-SCNC: 140 MMOL/L (ref 136–145)
TROPONIN I SERPL-MCNC: <0.02 NG/ML (ref 0–0.04)
WBC # BLD AUTO: 8.7 K/UL (ref 4.6–13.2)

## 2017-07-25 PROCEDURE — 99284 EMERGENCY DEPT VISIT MOD MDM: CPT

## 2017-07-25 PROCEDURE — 74011250636 HC RX REV CODE- 250/636: Performed by: EMERGENCY MEDICINE

## 2017-07-25 PROCEDURE — 96375 TX/PRO/DX INJ NEW DRUG ADDON: CPT

## 2017-07-25 PROCEDURE — 80048 BASIC METABOLIC PNL TOTAL CA: CPT | Performed by: EMERGENCY MEDICINE

## 2017-07-25 PROCEDURE — 96374 THER/PROPH/DIAG INJ IV PUSH: CPT

## 2017-07-25 PROCEDURE — 80076 HEPATIC FUNCTION PANEL: CPT | Performed by: EMERGENCY MEDICINE

## 2017-07-25 PROCEDURE — 85025 COMPLETE CBC W/AUTO DIFF WBC: CPT | Performed by: EMERGENCY MEDICINE

## 2017-07-25 PROCEDURE — 96361 HYDRATE IV INFUSION ADD-ON: CPT

## 2017-07-25 PROCEDURE — 93005 ELECTROCARDIOGRAM TRACING: CPT

## 2017-07-25 PROCEDURE — 83690 ASSAY OF LIPASE: CPT | Performed by: EMERGENCY MEDICINE

## 2017-07-25 PROCEDURE — 82962 GLUCOSE BLOOD TEST: CPT

## 2017-07-25 PROCEDURE — 84484 ASSAY OF TROPONIN QUANT: CPT | Performed by: EMERGENCY MEDICINE

## 2017-07-25 RX ORDER — HYDROMORPHONE HYDROCHLORIDE 1 MG/ML
1 INJECTION, SOLUTION INTRAMUSCULAR; INTRAVENOUS; SUBCUTANEOUS
Status: COMPLETED | OUTPATIENT
Start: 2017-07-25 | End: 2017-07-25

## 2017-07-25 RX ORDER — ONDANSETRON 2 MG/ML
4 INJECTION INTRAMUSCULAR; INTRAVENOUS
Status: COMPLETED | OUTPATIENT
Start: 2017-07-25 | End: 2017-07-25

## 2017-07-25 RX ADMIN — HYDROMORPHONE HYDROCHLORIDE 1 MG: 1 INJECTION, SOLUTION INTRAMUSCULAR; INTRAVENOUS; SUBCUTANEOUS at 04:12

## 2017-07-25 RX ADMIN — ONDANSETRON 4 MG: 2 INJECTION INTRAMUSCULAR; INTRAVENOUS at 04:12

## 2017-07-25 RX ADMIN — SODIUM CHLORIDE 1000 ML: 900 INJECTION, SOLUTION INTRAVENOUS at 04:12

## 2017-07-25 NOTE — ED PROVIDER NOTES
HPI Comments: Deonte Duckworth is a 62 y.o. Female with c/o recurrent abd pain, nausea that started about 24 hours ago and has gotten worse since. No fever, but having sweats. No cough, cp, syncope, blood in stool, melena. Pain is sharp, constant, worse with palpation, movement. Has had repeated episodes for recurrent abd pain with unremarkable ct recently    The history is provided by the patient and medical records. Past Medical History:   Diagnosis Date    Abdominal adhesions     Anemia     Arthritis     all over the body    Asthma     Asthma     Diabetes ([facility] [address]     Diabetes mellitus     Dyskinesia     bilateral    Essential hypertension     GERD (gastroesophageal reflux disease)     Hypertension     Menopause     Microhematuria     Stool color black        Past Surgical History:   Procedure Laterality Date    HX CHOLECYSTECTOMY  6/28/10    HX COLONOSCOPY      HX ENDOSCOPY      HX HERNIA REPAIR      HX HYSTERECTOMY      Fibroids    HX KNEE REPLACEMENT      HX OOPHORECTOMY  12/2000    HX SMALL BOWEL RESECTION  12/2000    HX SMALL BOWEL RESECTION  02/21/2017    Dr. Comfort Wray           Family History:   Problem Relation Age of Onset    Hypertension Other      parent    Breast Cancer Other 21    Heart Disease Father     Hypertension Father     Diabetes Father     Diabetes Mother     Hypertension Other      sibling    Diabetes Other      parent    Diabetes Sister     Kidney Disease Brother     Diabetes Brother        Social History     Social History    Marital status:      Spouse name: N/A    Number of children: N/A    Years of education: N/A     Occupational History    Not on file.      Social History Main Topics    Smoking status: Never Smoker    Smokeless tobacco: Never Used    Alcohol use No    Drug use: No    Sexual activity: Yes     Partners: Male     Birth control/ protection: None     Other Topics Concern    Not on file Social History Narrative         ALLERGIES: Macrodantin [nitrofurantoin macrocrystalline] and Tape [adhesive]    Review of Systems   Constitutional: Positive for appetite change. HENT: Negative for sore throat. Eyes: Negative for visual disturbance. Respiratory: Negative for cough and shortness of breath. Cardiovascular: Negative for chest pain. Gastrointestinal: Positive for abdominal pain. Endocrine: Negative for polyuria. Genitourinary: Negative for difficulty urinating. Musculoskeletal: Negative for gait problem. Skin: Negative for rash. Neurological: Negative for syncope. Hematological: Does not bruise/bleed easily. Psychiatric/Behavioral: Positive for sleep disturbance. Vitals:    07/25/17 0206 07/25/17 0419   BP: 114/82    Pulse: 91 73   Resp: 14 24   Temp: 97.8 °F (36.6 °C)    SpO2: 100% 100%   Weight: 68 kg (150 lb)    Height: 5' 4\" (1.626 m)             Physical Exam   Constitutional: She is oriented to person, place, and time. She appears well-developed and well-nourished. She appears distressed (appears uncomfortable in bed but not toxic). HENT:   Head: Normocephalic and atraumatic. Right Ear: External ear normal.   Left Ear: External ear normal.   Nose: Nose normal.   Mouth/Throat: Uvula is midline, oropharynx is clear and moist and mucous membranes are normal.   Eyes: Conjunctivae are normal. No scleral icterus. Neck: Neck supple. Cardiovascular: Normal rate, regular rhythm, normal heart sounds and intact distal pulses. Pulmonary/Chest: Effort normal and breath sounds normal.   Abdominal: Soft. Normal appearance. She exhibits no distension and no mass. There is no hepatosplenomegaly. There is tenderness in the epigastric area. There is guarding. There is no rigidity, no rebound and no CVA tenderness. Musculoskeletal: She exhibits no edema. Neurological: She is alert and oriented to person, place, and time. Gait normal.   Skin: Skin is warm and dry. She is not diaphoretic. Psychiatric: Her behavior is normal.   Nursing note and vitals reviewed. [facility]  ED Course       Procedures    Vitals:  Patient Vitals for the past 12 hrs:   Temp Pulse Resp BP SpO2   07/25/17 0419 - 73 24 - 100 %   07/25/17 0206 97.8 °F (36.6 °C) 91 14 114/82 100 %         Medications ordered:   Medications   sodium chloride 0.9 % bolus infusion 1,000 mL (1,000 mL IntraVENous New Bag 7/25/17 0412)   HYDROmorphone (PF) (DILAUDID) injection 1 mg (1 mg IntraVENous Given 7/25/17 0412)   ondansetron (ZOFRAN) injection 4 mg (4 mg IntraVENous Given 7/25/17 0412)         Lab findings:  Recent Results (from the past 12 hour(s))   GLUCOSE, POC    Collection Time: 07/25/17  2:11 AM   Result Value Ref Range    Glucose (POC) 133 (H) 70 - 110 mg/dL   CBC WITH AUTOMATED DIFF    Collection Time: 07/25/17  4:06 AM   Result Value Ref Range    WBC 8.7 4.6 - 13.2 K/uL    RBC 4.51 4.20 - 5.30 M/uL    HGB 13.0 12.0 - 16.0 g/dL    HCT 39.3 35.0 - 45.0 %    MCV 87.1 74.0 - 97.0 FL    MCH 28.8 24.0 - 34.0 PG    MCHC 33.1 31.0 - 37.0 g/dL    RDW 14.1 11.6 - 14.5 %    PLATELET 041 202 - 696 K/uL    MPV 10.2 9.2 - 11.8 FL    NEUTROPHILS 64 40 - 73 %    LYMPHOCYTES 28 21 - 52 %    MONOCYTES 7 3 - 10 %    EOSINOPHILS 1 0 - 5 %    BASOPHILS 0 0 - 2 %    ABS. NEUTROPHILS 5.5 1.8 - 8.0 K/UL    ABS. LYMPHOCYTES 2.4 0.9 - 3.6 K/UL    ABS. MONOCYTES 0.6 0.05 - 1.2 K/UL    ABS. EOSINOPHILS 0.1 0.0 - 0.4 K/UL    ABS. BASOPHILS 0.0 0.0 - 0.06 K/UL    DF AUTOMATED     HEPATIC FUNCTION PANEL    Collection Time: 07/25/17  4:06 AM   Result Value Ref Range    Protein, total 6.4 6.4 - 8.2 g/dL    Albumin 2.9 (L) 3.4 - 5.0 g/dL    Globulin 3.5 2.0 - 4.0 g/dL    A-G Ratio 0.8 0.8 - 1.7      Bilirubin, total 0.4 0.2 - 1.0 MG/DL    Bilirubin, direct <0.1 0.0 - 0.2 MG/DL    Alk.  phosphatase 110 45 - 117 U/L    AST (SGOT) 19 15 - 37 U/L    ALT (SGPT) 37 13 - 56 U/L   LIPASE    Collection Time: 07/25/17  4:06 AM   Result Value Ref Range Lipase 244 73 - 388 U/L   METABOLIC PANEL, BASIC    Collection Time: 07/25/17  4:06 AM   Result Value Ref Range    Sodium 140 136 - 145 mmol/L    Potassium 4.5 3.5 - 5.5 mmol/L    Chloride 102 100 - 108 mmol/L    CO2 27 21 - 32 mmol/L    Anion gap 11 3.0 - 18 mmol/L    Glucose 114 (H) 74 - 99 mg/dL    BUN 22 (H) 7.0 - 18 MG/DL    Creatinine 1.12 0.6 - 1.3 MG/DL    BUN/Creatinine ratio 20 12 - 20      GFR est AA >60 >60 ml/min/1.73m2    GFR est non-AA 50 (L) >60 ml/min/1.73m2    Calcium 9.0 8.5 - 10.1 MG/DL   TROPONIN I    Collection Time: 07/25/17  4:06 AM   Result Value Ref Range    Troponin-I, Qt. <0.02 0.0 - 0.045 NG/ML   EKG, 12 LEAD, INITIAL    Collection Time: 07/25/17  4:10 AM   Result Value Ref Range    Ventricular Rate 79 BPM    Atrial Rate 79 BPM    P-R Interval 124 ms    QRS Duration 66 ms    Q-T Interval 362 ms    QTC Calculation (Bezet) 415 ms    Calculated P Axis 71 degrees    Calculated R Axis 59 degrees    Calculated T Axis 140 degrees    Diagnosis       Normal sinus rhythm  T wave abnormality, consider lateral ischemia  Abnormal ECG  When compared with ECG of 01-JUN-2017 22:01,  No significant change was found         EKG interpretation by ED Physician:  nsr with ns tw abnl lateral leads  Rate 79, qtc 415  No sig change from previous    X-Ray, CT or other radiology findings or impressions:  No orders to display       Progress notes, Consult notes or additional Procedure notes:   Pain improved. Doubt need for repeat imaging, other work up here  I have discussed with patient and/or family/sig other the results, interpretation of any imaging if performed, suspected diagnosis and treatment plan to include instructions regarding the diagnoses listed to which understanding was expressed with all questions answered      Reevaluation of patient:   Stable for dc    Disposition:  Diagnosis:   1. Recurrent epigastric abdominal pain    2.  Abdominal pain, chronic, epigastric        Disposition: home      Follow-up Information     Follow up With Details Comments [address], [place] Schedule an appointment as soon as possible for a visit  [place] [address]  [phone]      [facility] EMERGENCY DEPT  If symptoms worsen [address] [facility] [address].  [phone]            Patient's Medications   Start Taking    No medications on file   Continue Taking    ALBUTEROL (PROVENTIL HFA, VENTOLIN HFA, PROAIR HFA) 90 MCG/ACTUATION INHALER    Take 1 Puff by inhalation every six (6) hours as needed for Wheezing. AMLODIPINE (NORVASC) 2.5 MG TABLET    Take 1 Tab by mouth nightly. ATORVASTATIN (LIPITOR) 40 MG TABLET    Take 1 Tab by mouth nightly. BISOPROLOL-HYDROCHLOROTHIAZIDE (ZIAC) 2.5-6.25 MG PER TABLET    Take 1 Tab by mouth daily. DAPAGLIFLOZIN (FARXIGA) 10 MG TAB    Take 1 Tab by mouth daily. Indications: type 2 diabetes mellitus    GLUCOSE BLOOD VI TEST STRIPS (ONETOUCH ULTRA TEST) STRIP    Check blood glucose as directed    METFORMIN (GLUMETZA) 500 MG TG24 24 HOUR TABLET    Take 500 mg by mouth two (2) times a day. OMEPRAZOLE (PRILOSEC) 10 MG CAPSULE    Take 10 mg by mouth daily. ONDANSETRON HCL (ZOFRAN, AS HYDROCHLORIDE,) 4 MG TABLET    Take 1 Tab by mouth every eight (8) hours as needed for Nausea. OXYCODONE-ACETAMINOPHEN (PERCOCET) 5-325 MG PER TABLET    Take 1 Tab by mouth every six (6) hours as needed for Pain. Max Daily Amount: 4 Tabs.    These Medications have changed    No medications on file   Stop Taking    No medications on file

## 2017-07-25 NOTE — ED TRIAGE NOTES
C/o constant epigastric and mid-back pain with nausea and headache that woke her up out of her sleep.

## 2017-07-26 LAB
ATRIAL RATE: 79 BPM
CALCULATED P AXIS, ECG09: 71 DEGREES
CALCULATED R AXIS, ECG10: 59 DEGREES
CALCULATED T AXIS, ECG11: 140 DEGREES
DIAGNOSIS, 93000: NORMAL
P-R INTERVAL, ECG05: 124 MS
Q-T INTERVAL, ECG07: 362 MS
QRS DURATION, ECG06: 66 MS
QTC CALCULATION (BEZET), ECG08: 415 MS
VENTRICULAR RATE, ECG03: 79 BPM

## 2017-08-01 ENCOUNTER — OFFICE VISIT (OUTPATIENT)
Dept: FAMILY MEDICINE CLINIC | Age: 57
End: 2017-08-01

## 2017-08-01 VITALS
SYSTOLIC BLOOD PRESSURE: 129 MMHG | DIASTOLIC BLOOD PRESSURE: 86 MMHG | BODY MASS INDEX: 25.85 KG/M2 | HEART RATE: 82 BPM | TEMPERATURE: 96.7 F | OXYGEN SATURATION: 97 % | HEIGHT: 64 IN | RESPIRATION RATE: 16 BRPM | WEIGHT: 151.4 LBS

## 2017-08-01 DIAGNOSIS — R10.9 CHRONIC ABDOMINAL PAIN: ICD-10-CM

## 2017-08-01 DIAGNOSIS — M94.0 COSTOCHONDRITIS: Primary | ICD-10-CM

## 2017-08-01 DIAGNOSIS — G89.29 CHRONIC ABDOMINAL PAIN: ICD-10-CM

## 2017-08-01 DIAGNOSIS — E11.65 TYPE 2 DIABETES MELLITUS WITH HYPERGLYCEMIA, WITHOUT LONG-TERM CURRENT USE OF INSULIN (HCC): ICD-10-CM

## 2017-08-01 RX ORDER — ONDANSETRON 4 MG/1
4 TABLET, FILM COATED ORAL
Qty: 12 TAB | Refills: 0 | Status: SHIPPED | OUTPATIENT
Start: 2017-08-01 | End: 2018-01-10 | Stop reason: SDUPTHER

## 2017-08-01 RX ORDER — OXYCODONE AND ACETAMINOPHEN 5; 325 MG/1; MG/1
1 TABLET ORAL
Qty: 12 TAB | Refills: 0 | Status: SHIPPED | OUTPATIENT
Start: 2017-08-01 | End: 2017-11-22 | Stop reason: ALTCHOICE

## 2017-08-01 RX ORDER — KETOROLAC TROMETHAMINE 30 MG/ML
60 INJECTION, SOLUTION INTRAMUSCULAR; INTRAVENOUS ONCE
Qty: 1 VIAL | Refills: 0
Start: 2017-08-01 | End: 2017-08-01

## 2017-08-01 NOTE — MR AVS SNAPSHOT
Visit Information Date & Time Provider Department Dept. Phone Encounter #  
 8/1/2017  2:45 PM Felipa Nelson [facility] [phone] Follow-up Instructions Return in about 1 week (around 8/8/2017) for abdominal pain. 1/23/2018  3:30 PM  
Any with Dejon Chavez MD  
[facility]) [address] [place] [id number]  
[phone]  
  
   
 [place] [id number] Upcoming Health Maintenance Date Due Hepatitis C Screening 1960 Pneumococcal 19-64 Medium Risk (1 of 1 - PPSV23) 2/4/1979 DTaP/Tdap/Td series (1 - Tdap) 2/4/1981 PAP AKA CERVICAL CYTOLOGY 2/4/1981 INFLUENZA AGE 9 TO ADULT 8/1/2017 HEMOGLOBIN A1C Q6M 9/27/2017 FOOT EXAM Q1 3/27/2018 MICROALBUMIN Q1 3/27/2018 LIPID PANEL Q1 3/27/2018 EYE EXAM RETINAL OR DILATED Q1 5/29/2018 BREAST CANCER SCRN MAMMOGRAM 1/6/2019 COLONOSCOPY 3/3/2026 Allergies as of 8/1/2017  Review Complete On: 8/1/2017 By: Felipa Nelson MD  
  
 Severity Noted Reaction Type Reactions Macrodantin [Nitrofurantoin Macrocrystalline]  01/31/2012    Itching, Other (comments) Tape [Adhesive]  09/30/2014    Other (comments) Paper tape-- feels like burning skin Burned skin when had wound vac Current Immunizations  Reviewed on 2/22/2017 Name Date Influenza Vaccine 11/15/2016 Not reviewed this visit You Were Diagnosed With   
  
 Codes Comments Costochondritis    -  Primary ICD-10-CM: M94.0 ICD-9-CM: 733.6 Chronic abdominal pain     ICD-10-CM: R10.9, G89.29 ICD-9-CM: 789.00, 338.29 Type 2 diabetes mellitus with hyperglycemia, without long-term current use of insulin (HCC)     ICD-10-CM: E11.65 ICD-9-CM: 250.00, 790.29 Vitals BP Pulse Temp Resp Height(growth percentile) Weight(growth percentile) 129/86 (BP 1 Location: Right arm, BP Patient Position: Sitting) 82 96.7 °F (35.9 °C) (Oral) 16 5' 4\" (1.626 m) 151 lb 6.4 oz (68.7 kg) SpO2 BMI OB Status Smoking Status 97% 25.99 kg/m2 Hysterectomy Never Smoker BMI and BSA Data Body Mass Index Body Surface Area  
 25.99 kg/m 2 1.76 m 2 Preferred Pharmacy Pharmacy Name Phone [facility]  [facility] [phone] Your Updated Medication List  
  
   
This list is accurate as of: 8/1/17  4:24 PM.  Always use your most recent med list.  
  
  
  
  
 albuterol 90 mcg/actuation inhaler Commonly known as:  PROVENTIL HFA, VENTOLIN HFA, PROAIR HFA Take 1 Puff by inhalation every six (6) hours as needed for Wheezing. amLODIPine 2.5 mg tablet Commonly known as:  [employer] Take 1 Tab by mouth nightly. atorvastatin 40 mg tablet Commonly known as:  LIPITOR Take 1 Tab by mouth nightly. dapagliflozin 10 mg Tab Commonly known as:  [employer] Take 1 Tab by mouth daily. Indications: type 2 diabetes mellitus  
  
 glucose blood VI test strips strip Commonly known as:  ONETOUCH ULTRA TEST Check blood glucose as directed GLUMETZA 500 mg Tg24 24 hour tablet Generic drug:  metFORMIN Take 500 mg by mouth two (2) times a day.  
  
 ketorolac tromethamine 60 mg/2 mL Soln Commonly known as:  TORADOL  
2 mL by IntraMUSCular route once for 1 dose. omeprazole 10 mg capsule Commonly known as:  PRILOSEC Take 10 mg by mouth daily. ondansetron hcl 4 mg tablet Commonly known as:  ZOFRAN (AS HYDROCHLORIDE) Take 1 Tab by mouth every eight (8) hours as needed for Nausea. oxyCODONE-acetaminophen 5-325 mg per tablet Commonly known as:  PERCOCET Take 1 Tab by mouth every six (6) hours as needed for Pain. Max Daily Amount: 4 Tabs. ZIAC 2.5-6.25 mg per tablet Generic drug:  bisoprolol-hydroCHLOROthiazide Take 1 Tab by mouth daily. Prescriptions Printed Refills  
 oxyCODONE-acetaminophen (PERCOCET) 5-325 mg per tablet 0 Sig: Take 1 Tab by mouth every six (6) hours as needed for Pain. Max Daily Amount: 4 Tabs. Class: Print Route: Oral  
  
Prescriptions Sent to Pharmacy Refills  
 ondansetron hcl (ZOFRAN, AS HYDROCHLORIDE,) 4 mg tablet 0 Sig: Take 1 Tab by mouth every eight (8) hours as needed for Nausea. Class: Normal  
 Pharmacy: [facility] [facility] #: [phone] Route: Oral  
  
We Performed the Following KETOROLAC TROMETHAMINE INJ [ HCP] NH THER/PROPH/DIAG INJECTION, SUBCUT/IM [id number] CPT(R)] Follow-up Instructions Return in about 1 week (around 8/8/2017) for abdominal pain. To-Do List   
 08/01/2017 Lab:  HEMOGLOBIN A1C WITH EAG   
  
 08/08/2017 9:00 AM  
  Appointment with Carlito Ford PT at [facility]  ([phone])  
  
 08/08/2017 11:30 AM  
  Appointment with Az Calix at [place] [address] ([phone]) Introducing [facility] SERVICES! [facility] introduces [employer] patient portal. Now you can access parts of your medical record, email your doctor's office, and request medication refills online. 1. In your internet browser, go to [email or web address] 2. Click on the First Time User? Click Here link in the Sign In box. You will see the New Member Sign Up page. 3. Enter your [employer] Access Code exactly as it appears below. You will not need to use this code after youve completed the sign-up process. If you do not sign up before the expiration date, you must request a new code. · [employer] Access Code: [email or web address] Expires: 10/23/2017  5:22 AM 
 
4. Enter the last four digits of your Social Security Number (xxxx) and Date of Birth (mm/dd/yyyy) as indicated and click Submit. You will be taken to the next sign-up page. 5. Create a [employer] ID. This will be your [employer] login ID and cannot be changed, so think of one that is secure and easy to remember. 6. Create a [employer] password. You can change your password at any time. 7. Enter your Password Reset Question and Answer. This can be used at a later time if you forget your password. 8. Enter your e-mail address. You will receive e-mail notification when new information is available in [address] 9. Click Sign Up. You can now view and download portions of your medical record. 10. Click the Download Summary menu link to download a portable copy of your medical information. If you have questions, please visit the Frequently Asked Questions section of the [employer] website. Remember, [employer] is NOT to be used for urgent needs. For medical emergencies, dial 911. Now available from your iPhone and Android! Please provide this summary of care documentation to your next provider. Your primary care clinician is listed as Imtiaz Ornelas. If you have any questions after today's visit, please call [phone].

## 2017-08-01 NOTE — PROGRESS NOTES
Helga Nevarez is a 62 y.o.  female and presents with    Chief Complaint   Patient presents with    Headache    Abdominal Pain           Subjective:  Mrs. Leticia Mckeon presents for f/u after ER visit where she was seen for headache and abdominal pain. She has had multiple ER visits for the same complaint. She was told she is dehydrated and she is voiding more than once an hour. She had episode of losing consciousness. She is not active. She denies depression or anxiety. She is encouraged by her family to be active. Chronic Pain:  Patient has had multiple abdominal surgeries. She was evaluated by general surgeon 1 month ago in the ER and surgery was not recommended; she ws instructed to f/u with urology. Symptoms onset: several months ago. Rheumatological ROS: ongoing significant pain in lower abdomen. Response to treatment plan: waxing and waning.        Patient Active Problem List   Diagnosis Code    Osteoarthritis of left knee M17.12    Hypertension I10    Hyperlipidemia E78.5    GERD (gastroesophageal reflux disease) K21.9    Asthma J45.909    Type 2 diabetes mellitus ([facility] [address] E11.9    SBO (small bowel obstruction) ([facility]) K56.69    Chronic abdominal pain R10.9, G89.29    Post-operative pain G89.18    Chest pain R07.9    Essential hypertension, benign I10    Sural neuritis G57.80     Patient Active Problem List    Diagnosis Date Noted    Chronic abdominal pain 03/14/2017    Post-operative pain 03/14/2017    SBO (small bowel obstruction) ([facility] [address] 02/21/2017    Osteoarthritis of left knee 06/27/2016    Hypertension 06/27/2016    Hyperlipidemia 06/27/2016    GERD (gastroesophageal reflux disease) 06/27/2016    Asthma 06/27/2016    Type 2 diabetes mellitus ([facility] [address] 06/27/2016    Sural neuritis 06/23/2014    Chest pain 04/20/2014    Essential hypertension, benign 02/27/2012     Current Outpatient Prescriptions   Medication Sig Dispense Refill    oxyCODONE-acetaminophen (PERCOCET) 5-325 mg per tablet Take 1 Tab by mouth every six (6) hours as needed for Pain. Max Daily Amount: 4 Tabs. 6 Tab 0    atorvastatin (LIPITOR) 40 mg tablet Take 1 Tab by mouth nightly. 90 Tab 3    amLODIPine (NORVASC) 2.5 mg tablet Take 1 Tab by mouth nightly. 90 Tab 1    ondansetron hcl (ZOFRAN, AS HYDROCHLORIDE,) 4 mg tablet Take 1 Tab by mouth every eight (8) hours as needed for Nausea. 12 Tab 0    dapagliflozin (FARXIGA) 10 mg tab Take 1 Tab by mouth daily. Indications: type 2 diabetes mellitus 90 Tab 3    glucose blood VI test strips (ONETOUCH ULTRA TEST) strip Check blood glucose as directed 100 Strip 1    bisoprolol-hydrochlorothiazide (ZIAC) 2.5-6.25 mg per tablet Take 1 Tab by mouth daily.  omeprazole (PRILOSEC) 10 mg capsule Take 10 mg by mouth daily.  albuterol (PROVENTIL HFA, VENTOLIN HFA, PROAIR HFA) 90 mcg/actuation inhaler Take 1 Puff by inhalation every six (6) hours as needed for Wheezing.  metFORMIN (GLUMETZA) 500 mg TG24 24 hour tablet Take 500 mg by mouth two (2) times a day.        Allergies   Allergen Reactions    Macrodantin [Nitrofurantoin Macrocrystalline] Itching and Other (comments)    Tape [Adhesive] Other (comments)     Paper tape-- feels like burning skin  Burned skin when had wound vac     Past Medical History:   Diagnosis Date    Abdominal adhesions     Anemia     Arthritis     all over the body    Asthma     Asthma     Diabetes ([address]     Diabetes mellitus     Dyskinesia     bilateral    Essential hypertension     GERD (gastroesophageal reflux disease)     Hypertension     Menopause     Microhematuria     Stool color black      Past Surgical History:   Procedure Laterality Date    HX CHOLECYSTECTOMY  6/28/10    HX COLONOSCOPY      HX ENDOSCOPY      HX HERNIA REPAIR      HX HYSTERECTOMY      Fibroids    HX KNEE REPLACEMENT      HX OOPHORECTOMY  12/2000    HX SMALL BOWEL RESECTION  12/2000    HX SMALL BOWEL RESECTION  02/21/2017    Dr. Krishan Duong       Family History   Problem Relation Age of Onset    Hypertension Other      parent    Breast Cancer Other 21    Heart Disease Father     Hypertension Father     Diabetes Father     Diabetes Mother     Hypertension Other      sibling    Diabetes Other      parent    Diabetes Sister     Kidney Disease Brother     Diabetes Brother      Social History   Substance Use Topics    Smoking status: Never Smoker    Smokeless tobacco: Never Used    Alcohol use No       ROS   Constitutional: Positive for appetite change. HENT: Negative for sore throat. Eyes: Negative for visual disturbance. Respiratory: Negative for cough and shortness of breath. Cardiovascular: Negative for chest pain. Gastrointestinal: Positive for abdominal pain. Endocrine: Negative for polyuria. Genitourinary: Negative for difficulty urinating. Musculoskeletal: Negative for gait problem. Skin: Negative for rash. Neurological: Negative for syncope. Hematological: Does not bruise/bleed easily. Psychiatric/Behavioral: Positive for sleep disturbance. All other systems reviewed and are negative. Objective:  Vitals:    08/01/17 1455   BP: 129/86   Pulse: 82   Resp: 16   Temp: 96.7 °F (35.9 °C)   TempSrc: Oral   SpO2: 97%   Weight: 151 lb 6.4 oz (68.7 kg)   Height: 5' 4\" (1.626 m)   PainSc:  10 - Worst pain ever   PainLoc: Abdomen       Constitutional: She is oriented to person, place, and time. She appears well-developed and well-nourished. She appears distressed; tearful  HENT:   Head: Normocephalic and atraumatic. Right Ear: External ear normal.   Left Ear: External ear normal.   Nose: Nose normal.   Mouth/Throat: Uvula is midline, oropharynx is clear and moist and mucous membranes are normal.   Eyes: Conjunctivae are normal. No scleral icterus. Neck: Neck supple.    Cardiovascular: Normal rate, regular rhythm, normal heart sounds and intact distal pulses. Pulmonary/Chest: Effort normal and breath sounds normal.   Abdominal: Soft. Normal appearance. She exhibits no distension and no mass. There is no hepatosplenomegaly. There is tenderness in the epigastric area. There is guarding. There is no rigidity, no rebound and no CVA tenderness. Musculoskeletal: She exhibits no edema. Neurological: She is alert and oriented to person, place, and time. Gait normal.    TESTS  CT abd/pelvis:  IMPRESSION:     1.  No CT evidence of an acute intra-abdominal/intra-pelvic process.       2. Left adnexal cystic appearing lesion, similar to 2015 CT, therefore likely benign. Status post hysterectomy. Consider pelvic sonogram at some point to further characterize.     -Probable bilateral parapelvic renal cysts.     -Possible hepatic steatosis. Assessment/Plan:    1. Costochondritis  Pain improved with toradol injection; recommend use of NSAID as needed with continue use of PPI    2. Chronic abdominal pain  Pain decreased to 6/10 after toradol injection. Pain management; pt will need close follow-up due to chronicity of pain; reviewed CT; Dr. Kayleigh Mckeon evaluated pt within the past month;   - oxyCODONE-acetaminophen (PERCOCET) 5-325 mg per tablet; Take 1 Tab by mouth every six (6) hours as needed for Pain. Max Daily Amount: 4 Tabs. Dispense: 12 Tab; Refill: 0  - ondansetron hcl (ZOFRAN, AS HYDROCHLORIDE,) 4 mg tablet; Take 1 Tab by mouth every eight (8) hours as needed for Nausea. Dispense: 12 Tab; Refill: 0  - ketorolac tromethamine (TORADOL) 60 mg/2 mL soln; 2 mL by IntraMUSCular route once for 1 dose. Dispense: 1 Vial; Refill: 0  - KETOROLAC TROMETHAMINE INJ  - TN THER/PROPH/DIAG INJECTION, SUBCUT/IM    3. Type 2 diabetes mellitus with hyperglycemia, without long-term current use of insulin (HCC)  Hyperglycemia while in ER; repeat Hgb a1c to determine control; goal <7;encourage healthy diet and exercise as tolerated  - HEMOGLOBIN A1C WITH EAG;  Future  - HEMOGLOBIN A1C WITH EAG      Lab review: labs reviewed, I note that liver functions normal, hemogram normal, basic metabolic panel mildly abnormal but acceptable, orders written for new lab studies as appropriate; see orders      I have discussed the diagnosis with the patient and the intended plan as seen in the above orders. The patient has received an after-visit summary and questions were answered concerning future plans. I have discussed medication side effects and warnings with the patient as well. I have reviewed the plan of care with the patient, accepted their input and they are in agreement with the treatment goals. Follow-up Disposition:  Return in about 1 week (around 8/8/2017) for abdominal pain. More than 1/2 of this 40 minute visit was spent in counselling and coordination of care, as described above.

## 2017-08-01 NOTE — PROGRESS NOTES
Tiffany Perez is a 62 y.o female that is present in the office for a ER follow up appointment. Patient stated that she has experienced 3 falls in one month due to dizziness. 1. Have you been to the ER, urgent care clinic since your last visit? Hospitalized since your last visit? No    2. Have you seen or consulted any other health care providers outside of the [address] [place] [address] since your last visit? Include any pap smears or colon screening.  No

## 2017-08-02 LAB
AVG GLU, 10930: 149 MG/DL (ref 91–123)
HBA1C MFR BLD HPLC: 6.8 % (ref 4.8–5.9)

## 2017-08-08 ENCOUNTER — HOSPITAL ENCOUNTER (OUTPATIENT)
Dept: PHYSICAL THERAPY | Age: 57
End: 2017-08-08

## 2017-08-08 ENCOUNTER — APPOINTMENT (OUTPATIENT)
Dept: PHYSICAL THERAPY | Age: 57
End: 2017-08-08

## 2017-08-09 ENCOUNTER — OFFICE VISIT (OUTPATIENT)
Dept: FAMILY MEDICINE CLINIC | Age: 57
End: 2017-08-09

## 2017-08-09 VITALS
HEIGHT: 64 IN | RESPIRATION RATE: 18 BRPM | WEIGHT: 154 LBS | TEMPERATURE: 98.3 F | DIASTOLIC BLOOD PRESSURE: 96 MMHG | SYSTOLIC BLOOD PRESSURE: 149 MMHG | HEART RATE: 95 BPM | BODY MASS INDEX: 26.29 KG/M2 | OXYGEN SATURATION: 95 %

## 2017-08-09 DIAGNOSIS — G89.29 CHRONIC ABDOMINAL PAIN: ICD-10-CM

## 2017-08-09 DIAGNOSIS — J40 BRONCHITIS: Primary | ICD-10-CM

## 2017-08-09 DIAGNOSIS — I10 ESSENTIAL HYPERTENSION: Chronic | ICD-10-CM

## 2017-08-09 DIAGNOSIS — R10.9 CHRONIC ABDOMINAL PAIN: ICD-10-CM

## 2017-08-09 DIAGNOSIS — J01.00 ACUTE NON-RECURRENT MAXILLARY SINUSITIS: ICD-10-CM

## 2017-08-09 NOTE — PROGRESS NOTES
Kevin Dubon is a 62 y.o.  female and presents with    Chief Complaint   Patient presents with    Cough     Subjective:  Acute Bronchitis  Patient presents for evaluation of productive cough and sweats. Symptoms began 4 days ago and are rapidly improving since that time. Past history is significant for bronchitis several years ago. She was in physical therapy yesterday for abdominal pain and known adhesions when she had coughing which would not let up. She was sent to the ER by the therapist where she received nebulized treatment and prednisone. She continues to use albuterol inhaler which helps with cough. Her abdominal pain is better compared to last week after NSAID injection and hydrocodone which she is using once a day. The cough has exacerbated her abdominal pain. She denies fever. She denies diarrhea; no vomiting.;  She c/o decreased appetite.     Patient Active Problem List   Diagnosis Code    Osteoarthritis of left knee M17.12    Hypertension I10    Hyperlipidemia E78.5    GERD (gastroesophageal reflux disease) K21.9    Asthma J45.909    Type 2 diabetes mellitus ([facility] [address] E11.9    SBO [facility] bowel obstruction) ([facility]) K56.69    Chronic abdominal pain R10.9, G89.29    Post-operative pain G89.18    Chest pain R07.9    Essential hypertension, benign I10    Sural neuritis G57.80     Patient Active Problem List    Diagnosis Date Noted    Chronic abdominal pain 03/14/2017    Post-operative pain 03/14/2017    SBO (small bowel obstruction) ([facility] [address] 02/21/2017    Osteoarthritis of left knee 06/27/2016    Hypertension 06/27/2016    Hyperlipidemia 06/27/2016    GERD (gastroesophageal reflux disease) 06/27/2016    Asthma 06/27/2016    Type 2 diabetes mellitus ([facility] [address] 06/27/2016    Sural neuritis 06/23/2014    Chest pain 04/20/2014    Essential hypertension, benign 02/27/2012     Current Outpatient Prescriptions   Medication Sig Dispense Refill    oxyCODONE-acetaminophen (PERCOCET) 5-325 mg per tablet Take 1 Tab by mouth every six (6) hours as needed for Pain. Max Daily Amount: 4 Tabs. 12 Tab 0    ondansetron hcl (ZOFRAN, AS HYDROCHLORIDE,) 4 mg tablet Take 1 Tab by mouth every eight (8) hours as needed for Nausea. 12 Tab 0    atorvastatin (LIPITOR) 40 mg tablet Take 1 Tab by mouth nightly. 90 Tab 3    amLODIPine (NORVASC) 2.5 mg tablet Take 1 Tab by mouth nightly. 90 Tab 1    dapagliflozin (FARXIGA) 10 mg tab Take 1 Tab by mouth daily. Indications: type 2 diabetes mellitus 90 Tab 3    glucose blood VI test strips (ONETOUCH ULTRA TEST) strip Check blood glucose as directed 100 Strip 1    bisoprolol-hydrochlorothiazide (ZIAC) 2.5-6.25 mg per tablet Take 1 Tab by mouth daily.  omeprazole (PRILOSEC) 10 mg capsule Take 10 mg by mouth daily.  albuterol (PROVENTIL HFA, VENTOLIN HFA, PROAIR HFA) 90 mcg/actuation inhaler Take 1 Puff by inhalation every six (6) hours as needed for Wheezing.  metFORMIN (GLUMETZA) 500 mg TG24 24 hour tablet Take 500 mg by mouth two (2) times a day.        Allergies   Allergen Reactions    Macrodantin [Nitrofurantoin Macrocrystalline] Itching and Other (comments)    Tape [Adhesive] Other (comments)     Paper tape-- feels like burning skin  Burned skin when had wound vac     Past Medical History:   Diagnosis Date    Abdominal adhesions     Anemia     Arthritis     all over the body    Asthma     Asthma     Diabetes ([facility] [address]     Diabetes mellitus     Dyskinesia     bilateral    Essential hypertension     GERD (gastroesophageal reflux disease)     Hypertension     Menopause     Microhematuria     Stool color black      Past Surgical History:   Procedure Laterality Date    HX CHOLECYSTECTOMY  6/28/10    HX COLONOSCOPY      HX ENDOSCOPY      HX HERNIA REPAIR      HX HYSTERECTOMY      Fibroids    HX KNEE REPLACEMENT      HX OOPHORECTOMY  12/2000    HX SMALL BOWEL RESECTION  12/2000    HX SMALL BOWEL RESECTION  02/21/2017    Dr. Trent Goldberg       Family History   Problem Relation Age of Onset    Hypertension Other      parent    Breast Cancer Other 21    Heart Disease Father     Hypertension Father     Diabetes Father     Diabetes Mother     Hypertension Other      sibling    Diabetes Other      parent    Diabetes Sister     Kidney Disease Brother     Diabetes Brother      Social History   Substance Use Topics    Smoking status: Never Smoker    Smokeless tobacco: Never Used    Alcohol use No       ROS   General ROS: negative for - chills or fever  Ophthalmic ROS: positive for - uses glasses  ENT ROS: positive for - headaches associated with onset of cough and congestion; she is using nasal spray  Endocrine ROS: negative for - polydipsia/polyuria or unexpected weight changes  Cardiovascular ROS: positive for - chest pain with cough  Genito-Urinary ROS: no dysuria, trouble voiding, or hematuria  Neurological ROS: no TIA or stroke symptoms  Dermatological ROS: negative for - rash or skin lesion changes    All other systems reviewed and are negative. Objective:Vitals:    08/09/17 1044   BP: (!) 149/96   Pulse: 95   Resp: 18   Temp: 98.3 °F (36.8 °C)   TempSrc: Oral   SpO2: 95%   Weight: 154 lb (69.9 kg)   Height: 5' 4\" (1.626 m)   PainSc:   6   PainLoc: Abdomen       alert, well appearing, and in no distress, oriented to person, place, and time and overweight  Nose - clear rhinorrhea and sinus tenderness noted bilateral maxillary  Mouth - mucous membranes moist, pharynx normal without lesions  Chest - clear to auscultation, no wheezes, rales or rhonchi, symmetric air entry  Heart - normal rate, regular rhythm, normal S1, S2, no murmurs, rubs, clicks or gallops  abd - diffuse ttp    TESTS  Chest abd xray - no acute cardiopulmonary disease    Assessment/Plan:    1. Bronchitis  Continue albuterol; use guaifenesin     2.  Essential hypertension  Goal <140/90; current illness elevating BP    3. Chronic abdominal pain  Pt referred for physical therapy by gynecology; reschedule     4. Acute non-recurrent maxillary sinusitis  Demonstrated sinus rinse. Lab review: labs reviewed, I note that hemogram normal, basic metabolic panel abnormal glucose      I have discussed the diagnosis with the patient and the intended plan as seen in the above orders. The patient has received an after-visit summary and questions were answered concerning future plans. I have discussed medication side effects and warnings with the patient as well. I have reviewed the plan of care with the patient, accepted their input and they are in agreement with the treatment goals. Follow-up Disposition:  Return if symptoms worsen or fail to improve.

## 2017-08-09 NOTE — MR AVS SNAPSHOT
Visit Information Date & Time Provider Department Dept. Phone Encounter #  
 8/9/2017 11:15 AM Rodrigo Waddell, [address] [facility] [phone] [id number] Follow-up Instructions Return if symptoms worsen or fail to improve. Upcoming Health Maintenance Date Due Hepatitis C Screening 1960 Pneumococcal 19-64 Medium Risk (1 of 1 - PPSV23) 2/4/1979 DTaP/Tdap/Td series (1 - Tdap) 2/4/1981 PAP AKA CERVICAL CYTOLOGY 2/4/1981 INFLUENZA AGE 9 TO ADULT 8/1/2017 HEMOGLOBIN A1C Q6M 2/2/2018 FOOT EXAM Q1 3/27/2018 MICROALBUMIN Q1 3/27/2018 LIPID PANEL Q1 3/27/2018 EYE EXAM RETINAL OR DILATED Q1 5/29/2018 BREAST CANCER SCRN MAMMOGRAM 1/6/2019 COLONOSCOPY 3/3/2026 Allergies as of 8/9/2017  Review Complete On: 8/1/2017 By: Rodrigo Waddell MD  
  
 Severity Noted Reaction Type Reactions Macrodantin [Nitrofurantoin Macrocrystalline]  01/31/2012    Itching, Other (comments) Tape [Adhesive]  09/30/2014    Other (comments) Paper tape-- feels like burning skin Burned skin when had wound vac Current Immunizations  Reviewed on 2/22/2017 Name Date Influenza Vaccine 11/15/2016 Not reviewed this visit You Were Diagnosed With   
  
 Codes Comments Bronchitis    -  Primary ICD-10-CM: Y23 ICD-9-CM: 928 Essential hypertension     ICD-10-CM: I10 
ICD-9-CM: 401.9 Chronic abdominal pain     ICD-10-CM: R10.9, G89.29 ICD-9-CM: 789.00, 338.29 Acute non-recurrent maxillary sinusitis     ICD-10-CM: J01.00 ICD-9-CM: 461.0 Vitals BP Pulse Temp Resp Height(growth percentile) Weight(growth percentile) (!) 149/96 (BP 1 Location: Right arm, BP Patient Position: Sitting) 95 98.3 °F (36.8 °C) (Oral) 18 5' 4\" (1.626 m) 154 lb (69.9 kg) SpO2 BMI OB Status Smoking Status 95% 26.43 kg/m2 Hysterectomy Never Smoker BMI and BSA Data  Body Mass Index Body Surface Area  
 26.43 kg/m 2 1.78 m 2  
  
  
 Preferred Pharmacy Pharmacy Name Phone [address]  [facility] [phone] Your Updated Medication List  
  
   
This list is accurate as of: 8/9/17 11:17 AM.  Always use your most recent med list.  
  
  
  
  
 albuterol 90 mcg/actuation inhaler Commonly known as:  PROVENTIL HFA, VENTOLIN HFA, PROAIR HFA Take 1 Puff by inhalation every six (6) hours as needed for Wheezing. amLODIPine 2.5 mg tablet Commonly known as:  [employer] Rings Take 1 Tab by mouth nightly. atorvastatin 40 mg tablet Commonly known as:  LIPITOR Take 1 Tab by mouth nightly. dapagliflozin 10 mg Tab Commonly known as:  [employer] Take 1 Tab by mouth daily. Indications: type 2 diabetes mellitus  
  
 glucose blood VI test strips strip Commonly known as:  ONETOUCH ULTRA TEST Check blood glucose as directed GLUMETZA 500 mg Tg24 24 hour tablet Generic drug:  metFORMIN Take 500 mg by mouth two (2) times a day. omeprazole 10 mg capsule Commonly known as:  PRILOSEC Take 10 mg by mouth daily. ondansetron hcl 4 mg tablet Commonly known as:  ZOFRAN (AS HYDROCHLORIDE) Take 1 Tab by mouth every eight (8) hours as needed for Nausea. oxyCODONE-acetaminophen 5-325 mg per tablet Commonly known as:  PERCOCET Take 1 Tab by mouth every six (6) hours as needed for Pain. Max Daily Amount: 4 Tabs. ZIAC 2.5-6.25 mg per tablet Generic drug:  bisoprolol-hydroCHLOROthiazide Take 1 Tab by mouth daily. Follow-up Instructions Return if symptoms worsen or fail to improve. Patient Instructions Saline Nasal Washes: Care Instructions Your Care Instructions Saline nasal washes help keep the nasal passages open by washing out thick or dried mucus. This simple remedy can help relieve symptoms of allergies, sinusitis, and colds.  It also can make the nose feel more comfortable by keeping the mucous membranes moist. You may notice a little burning sensation in your nose the first few times you use the solution, but this usually gets better in a few days. Follow-up care is a key part of your treatment and safety. Be sure to make and go to all appointments, and call your doctor if you are having problems. It's also a good idea to know your test results and keep a list of the medicines you take. How can you care for yourself at home? · You can buy premixed saline solution in a squeeze bottle or other sinus rinse products at a drugstore. Read and follow the instructions on the label. · You also can make your own saline solution by adding 1 teaspoon of salt and 1 teaspoon of baking soda to 2 cups of distilled water. · If you use a homemade solution, pour a small amount into a clean bowl. Using a rubber bulb syringe, squeeze the syringe and place the tip in the salt water. Pull a small amount of the salt water into the syringe by relaxing your hand. · Sit down with your head tilted slightly back. Do not lie down. Put the tip of the bulb syringe or the squeeze bottle a little way into one of your nostrils. Gently drip or squirt a few drops into the nostril. Repeat with the other nostril. Some sneezing and gagging are normal at first. 
· Gently blow your nose. · Wipe the syringe or bottle tip clean after each use. · Repeat this 2 or 3 times a day. · Use nasal washes gently if you have nosebleeds often. When should you call for help? Watch closely for changes in your health, and be sure to contact your doctor if: 
· You often get nosebleeds. · You have problems doing the nasal washes. Where can you learn more? Go to http://betzy-didi.info/. Enter [phone] in the search box to learn more about \"Saline Nasal Washes: Care Instructions. \" Current as of: May 4, 2017 Content Version: 11.3 © 8479-3959 [employer], [employer].  Care instructions adapted under license by [address] (which disclaims liability or warranty for this information). If you have questions about a medical condition or this instruction, always ask your healthcare professional. [address] any warranty or liability for your use of this information. Introducing [facility]! Merry Ramos introduces [employer] patient portal. Now you can access parts of your medical record, email your doctor's office, and request medication refills online. 1. In your internet browser, go to [email or web address] 2. Click on the First Time User? Click Here link in the Sign In box. You will see the New Member Sign Up page. 3. Enter your [employer] Access Code exactly as it appears below. You will not need to use this code after youve completed the sign-up process. If you do not sign up before the expiration date, you must request a new code. · [employer] Access Code: [email or web address] Expires: 10/23/2017  5:22 AM 
 
4. Enter the last four digits of your Social Security Number (xxxx) and Date of Birth (mm/dd/yyyy) as indicated and click Submit. You will be taken to the next sign-up page. 5. Create a [employer] ID. This will be your [employer] login ID and cannot be changed, so think of one that is secure and easy to remember. 6. Create a [employer] password. You can change your password at any time. 7. Enter your Password Reset Question and Answer. This can be used at a later time if you forget your password. 8. Enter your e-mail address. You will receive e-mail notification when new information is available in [address] 9. Click Sign Up. You can now view and download portions of your medical record. 10. Click the Download Summary menu link to download a portable copy of your medical information. If you have questions, please visit the Frequently Asked Questions section of the [employer] website.  Remember, [employer] is NOT to be used for urgent needs. For medical emergencies, dial 911. Now available from your iPhone and Android! Please provide this summary of care documentation to your next provider. Your primary care clinician is listed as Toni Latif. If you have any questions after today's visit, please call [phone].

## 2017-08-09 NOTE — PATIENT INSTRUCTIONS
Saline Nasal Washes: Care Instructions  Your Care Instructions  Saline nasal washes help keep the nasal passages open by washing out thick or dried mucus. This simple remedy can help relieve symptoms of allergies, sinusitis, and colds. It also can make the nose feel more comfortable by keeping the mucous membranes moist. You may notice a little burning sensation in your nose the first few times you use the solution, but this usually gets better in a few days. Follow-up care is a key part of your treatment and safety. Be sure to make and go to all appointments, and call your doctor if you are having problems. It's also a good idea to know your test results and keep a list of the medicines you take. How can you care for yourself at home? · You can buy premixed saline solution in a squeeze bottle or other sinus rinse products at a drugstore. Read and follow the instructions on the label. · You also can make your own saline solution by adding 1 teaspoon of salt and 1 teaspoon of baking soda to 2 cups of distilled water. · If you use a homemade solution, pour a small amount into a clean bowl. Using a rubber bulb syringe, squeeze the syringe and place the tip in the salt water. Pull a small amount of the salt water into the syringe by relaxing your hand. · Sit down with your head tilted slightly back. Do not lie down. Put the tip of the bulb syringe or the squeeze bottle a little way into one of your nostrils. Gently drip or squirt a few drops into the nostril. Repeat with the other nostril. Some sneezing and gagging are normal at first.  · Gently blow your nose. · Wipe the syringe or bottle tip clean after each use. · Repeat this 2 or 3 times a day. · Use nasal washes gently if you have nosebleeds often. When should you call for help? Watch closely for changes in your health, and be sure to contact your doctor if:  · You often get nosebleeds. · You have problems doing the nasal washes.   Where can you learn more? Go to http://betzy-didi.info/. Enter [id number] in the search box to learn more about \"Saline Nasal Washes: Care Instructions. \"  Current as of: May 4, 2017  Content Version: 11.3  © 1335-9741 [employer]. Care instructions adapted under license by [employer] (which disclaims liability or warranty for this information). If you have questions about a medical condition or this instruction, always ask your healthcare professional. [address] any warranty or liability for your use of this information.

## 2017-08-15 ENCOUNTER — APPOINTMENT (OUTPATIENT)
Dept: GENERAL RADIOLOGY | Age: 57
End: 2017-08-15
Attending: PHYSICIAN ASSISTANT
Payer: COMMERCIAL

## 2017-08-15 ENCOUNTER — HOSPITAL ENCOUNTER (EMERGENCY)
Age: 57
Discharge: HOME | End: 2017-08-15
Attending: EMERGENCY MEDICINE
Payer: COMMERCIAL

## 2017-08-15 ENCOUNTER — TELEPHONE (OUTPATIENT)
Dept: FAMILY MEDICINE CLINIC | Age: 57
End: 2017-08-15

## 2017-08-15 VITALS
HEIGHT: 60 IN | TEMPERATURE: 98.4 F | OXYGEN SATURATION: 100 % | SYSTOLIC BLOOD PRESSURE: 141 MMHG | BODY MASS INDEX: 29.45 KG/M2 | HEART RATE: 81 BPM | WEIGHT: 150 LBS | DIASTOLIC BLOOD PRESSURE: 102 MMHG | RESPIRATION RATE: 20 BRPM

## 2017-08-15 DIAGNOSIS — R10.9 CHRONIC ABDOMINAL PAIN: Primary | ICD-10-CM

## 2017-08-15 DIAGNOSIS — K59.00 CONSTIPATION, UNSPECIFIED CONSTIPATION TYPE: ICD-10-CM

## 2017-08-15 DIAGNOSIS — R06.02 SOB (SHORTNESS OF BREATH): ICD-10-CM

## 2017-08-15 DIAGNOSIS — R03.0 ELEVATED BLOOD PRESSURE READING: ICD-10-CM

## 2017-08-15 DIAGNOSIS — G89.29 CHRONIC ABDOMINAL PAIN: Primary | ICD-10-CM

## 2017-08-15 DIAGNOSIS — F14.10 COCAINE ABUSE (HCC): ICD-10-CM

## 2017-08-15 LAB
ALBUMIN SERPL BCP-MCNC: 2.8 G/DL (ref 3.4–5)
ALBUMIN/GLOB SERPL: 0.7 {RATIO} (ref 0.8–1.7)
ALP SERPL-CCNC: 109 U/L (ref 45–117)
ALT SERPL-CCNC: 26 U/L (ref 13–56)
AMPHET UR QL SCN: NEGATIVE
ANION GAP BLD CALC-SCNC: 6 MMOL/L (ref 3–18)
APPEARANCE UR: CLEAR
AST SERPL W P-5'-P-CCNC: 10 U/L (ref 15–37)
BACTERIA URNS QL MICRO: NEGATIVE /HPF
BARBITURATES UR QL SCN: NEGATIVE
BASOPHILS # BLD: 0 K/UL (ref 0–0.06)
BASOPHILS NFR BLD: 0 % (ref 0–2)
BENZODIAZ UR QL: NEGATIVE
BILIRUB DIRECT SERPL-MCNC: <0.1 MG/DL (ref 0–0.2)
BILIRUB SERPL-MCNC: 0.3 MG/DL (ref 0.2–1)
BILIRUB UR QL: NEGATIVE
BUN SERPL-MCNC: 18 MG/DL (ref 7–18)
BUN/CREAT SERPL: 17 (ref 12–20)
CALCIUM SERPL-MCNC: 8.8 MG/DL (ref 8.5–10.1)
CANNABINOIDS UR QL SCN: NEGATIVE
CHLORIDE SERPL-SCNC: 108 MMOL/L (ref 100–108)
CK MB CFR SERPL CALC: NORMAL % (ref 0–4)
CK MB SERPL-MCNC: <1 NG/ML (ref 5–25)
CK SERPL-CCNC: 42 U/L (ref 26–192)
CO2 SERPL-SCNC: 27 MMOL/L (ref 21–32)
COCAINE UR QL SCN: POSITIVE
COLOR UR: YELLOW
CREAT SERPL-MCNC: 1.09 MG/DL (ref 0.6–1.3)
DIFFERENTIAL METHOD BLD: NORMAL
EOSINOPHIL # BLD: 0.1 K/UL (ref 0–0.4)
EOSINOPHIL NFR BLD: 1 % (ref 0–5)
EPITH CASTS URNS QL MICRO: NORMAL /LPF (ref 0–5)
ERYTHROCYTE [DISTWIDTH] IN BLOOD BY AUTOMATED COUNT: 13.8 % (ref 11.6–14.5)
GLOBULIN SER CALC-MCNC: 4 G/DL (ref 2–4)
GLUCOSE SERPL-MCNC: 229 MG/DL (ref 74–99)
GLUCOSE UR STRIP.AUTO-MCNC: >1000 MG/DL
HCT VFR BLD AUTO: 40.2 % (ref 35–45)
HDSCOM,HDSCOM: ABNORMAL
HGB BLD-MCNC: 13.5 G/DL (ref 12–16)
HGB UR QL STRIP: ABNORMAL
KETONES UR QL STRIP.AUTO: NEGATIVE MG/DL
LEUKOCYTE ESTERASE UR QL STRIP.AUTO: NEGATIVE
LIPASE SERPL-CCNC: 268 U/L (ref 73–393)
LYMPHOCYTES # BLD: 2.4 K/UL (ref 0.9–3.6)
LYMPHOCYTES NFR BLD: 23 % (ref 21–52)
MCH RBC QN AUTO: 29.5 PG (ref 24–34)
MCHC RBC AUTO-ENTMCNC: 33.6 G/DL (ref 31–37)
MCV RBC AUTO: 87.8 FL (ref 74–97)
METHADONE UR QL: NEGATIVE
MONOCYTES # BLD: 0.8 K/UL (ref 0.05–1.2)
MONOCYTES NFR BLD: 8 % (ref 3–10)
NEUTS SEG # BLD: 7 K/UL (ref 1.8–8)
NEUTS SEG NFR BLD: 68 % (ref 40–73)
NITRITE UR QL STRIP.AUTO: NEGATIVE
OPIATES UR QL: NEGATIVE
PCP UR QL: NEGATIVE
PH UR STRIP: 6.5 [PH] (ref 5–8)
PLATELET # BLD AUTO: 291 K/UL (ref 135–420)
PMV BLD AUTO: 10.1 FL (ref 9.2–11.8)
POTASSIUM SERPL-SCNC: 4.1 MMOL/L (ref 3.5–5.5)
PROT SERPL-MCNC: 6.8 G/DL (ref 6.4–8.2)
PROT UR STRIP-MCNC: NEGATIVE MG/DL
RBC # BLD AUTO: 4.58 M/UL (ref 4.2–5.3)
RBC #/AREA URNS HPF: NORMAL /HPF (ref 0–5)
SODIUM SERPL-SCNC: 141 MMOL/L (ref 136–145)
SP GR UR REFRACTOMETRY: 1.03 (ref 1–1.03)
TROPONIN I SERPL-MCNC: <0.02 NG/ML (ref 0–0.04)
UROBILINOGEN UR QL STRIP.AUTO: 0.2 EU/DL (ref 0.2–1)
WBC # BLD AUTO: 10.3 K/UL (ref 4.6–13.2)
WBC URNS QL MICRO: NEGATIVE /HPF (ref 0–4)

## 2017-08-15 PROCEDURE — 74022 RADEX COMPL AQT ABD SERIES: CPT

## 2017-08-15 PROCEDURE — 80048 BASIC METABOLIC PNL TOTAL CA: CPT | Performed by: PHYSICIAN ASSISTANT

## 2017-08-15 PROCEDURE — 80076 HEPATIC FUNCTION PANEL: CPT | Performed by: PHYSICIAN ASSISTANT

## 2017-08-15 PROCEDURE — 74011250636 HC RX REV CODE- 250/636: Performed by: PHYSICIAN ASSISTANT

## 2017-08-15 PROCEDURE — 99283 EMERGENCY DEPT VISIT LOW MDM: CPT

## 2017-08-15 PROCEDURE — 96374 THER/PROPH/DIAG INJ IV PUSH: CPT

## 2017-08-15 PROCEDURE — 82550 ASSAY OF CK (CPK): CPT | Performed by: PHYSICIAN ASSISTANT

## 2017-08-15 PROCEDURE — 83690 ASSAY OF LIPASE: CPT | Performed by: PHYSICIAN ASSISTANT

## 2017-08-15 PROCEDURE — 96361 HYDRATE IV INFUSION ADD-ON: CPT

## 2017-08-15 PROCEDURE — 80307 DRUG TEST PRSMV CHEM ANLYZR: CPT | Performed by: PHYSICIAN ASSISTANT

## 2017-08-15 PROCEDURE — 85025 COMPLETE CBC W/AUTO DIFF WBC: CPT | Performed by: PHYSICIAN ASSISTANT

## 2017-08-15 PROCEDURE — 81001 URINALYSIS AUTO W/SCOPE: CPT | Performed by: PHYSICIAN ASSISTANT

## 2017-08-15 PROCEDURE — 93005 ELECTROCARDIOGRAM TRACING: CPT

## 2017-08-15 RX ORDER — POLYETHYLENE GLYCOL 3350 17 G/17G
17 POWDER, FOR SOLUTION ORAL DAILY
Qty: 119 G | Refills: 0 | Status: SHIPPED | OUTPATIENT
Start: 2017-08-15 | End: 2018-04-26

## 2017-08-15 RX ORDER — MORPHINE SULFATE 4 MG/ML
4 INJECTION, SOLUTION INTRAMUSCULAR; INTRAVENOUS
Status: COMPLETED | OUTPATIENT
Start: 2017-08-15 | End: 2017-08-15

## 2017-08-15 RX ADMIN — SODIUM CHLORIDE 1000 ML: 900 INJECTION, SOLUTION INTRAVENOUS at 15:26

## 2017-08-15 RX ADMIN — Medication 4 MG: at 15:26

## 2017-08-15 NOTE — DISCHARGE INSTRUCTIONS
Abdominal Pain: Care Instructions  Your Care Instructions    Abdominal pain has many possible causes. Some aren't serious and get better on their own in a few days. Others need more testing and treatment. If your pain continues or gets worse, you need to be rechecked and may need more tests to find out what is wrong. You may need surgery to correct the problem. Don't ignore new symptoms, such as fever, nausea and vomiting, urination problems, pain that gets worse, and dizziness. These may be signs of a more serious problem. Your doctor may have recommended a follow-up visit in the next 8 to 12 hours. If you are not getting better, you may need more tests or treatment. The doctor has checked you carefully, but problems can develop later. If you notice any problems or new symptoms, get medical treatment right away. Follow-up care is a key part of your treatment and safety. Be sure to make and go to all appointments, and call your doctor if you are having problems. It's also a good idea to know your test results and keep a list of the medicines you take. How can you care for yourself at home? · Rest until you feel better. · To prevent dehydration, drink plenty of fluids, enough so that your urine is light yellow or clear like water. Choose water and other caffeine-free clear liquids until you feel better. If you have kidney, heart, or liver disease and have to limit fluids, talk with your doctor before you increase the amount of fluids you drink. · If your stomach is upset, eat mild foods, such as rice, dry toast or crackers, bananas, and applesauce. Try eating several small meals instead of two or three large ones. · Wait until 48 hours after all symptoms have gone away before you have spicy foods, alcohol, and drinks that contain caffeine. · Do not eat foods that are high in fat. · Avoid anti-inflammatory medicines such as aspirin, ibuprofen (Advil, Motrin), and naproxen (Aleve).  These can cause stomach upset. Talk to your doctor if you take daily aspirin for another health problem. When should you call for help? Call 911 anytime you think you may need emergency care. For example, call if:  · You passed out (lost consciousness). · You pass maroon or very bloody stools. · You vomit blood or what looks like coffee grounds. · You have new, severe belly pain. Call your doctor now or seek immediate medical care if:  · Your pain gets worse, especially if it becomes focused in one area of your belly. · You have a new or higher fever. · Your stools are black and look like tar, or they have streaks of blood. · You have unexpected vaginal bleeding. · You have symptoms of a urinary tract infection. These may include:  ¨ Pain when you urinate. ¨ Urinating more often than usual.  ¨ Blood in your urine. · You are dizzy or lightheaded, or you feel like you may faint. Watch closely for changes in your health, and be sure to contact your doctor if:  · You are not getting better after 1 day (24 hours). Where can you learn more? Go to http://betzyCircleUpdidi.info/. Enter F119 in the search box to learn more about \"Abdominal Pain: Care Instructions. \"  Current as of: March 20, 2017  Content Version: 11.3  © 6193-6642 [employer]. Care instructions adapted under license by [employer] (which disclaims liability or warranty for this information). If you have questions about a medical condition or this instruction, always ask your healthcare professional. [place] [address] any warranty or liability for your use of this information. Chronic Pain: Care Instructions  Your Care Instructions  Chronic pain is pain that lasts a long time (months or even years) and may or may not have a clear cause. It is different from acute pain, which usually does have a clear cause--like an injury or illness--and gets better over time.  Chronic pain:  · Lasts over time but may vary from day to day. · Does not go away despite efforts to end it. · May disrupt your sleep and lead to fatigue. · May cause depression or anxiety. · May make your muscles tense, causing more pain. · Can disrupt your work, hobbies, home life, and relationships with friends and family. Chronic pain is a very real condition. It is not just in your head. Treatment can help and usually includes several methods used together, such as medicines, physical therapy, exercise, and other treatments. Learning how to relax and changing negative thought patterns can also help you cope. Chronic pain is complex. Taking an active role in your treatment will help you better manage your pain. Tell your doctor if you have trouble dealing with your pain. You may have to try several things before you find what works best for you. Follow-up care is a key part of your treatment and safety. Be sure to make and go to all appointments, and call your doctor if you are having problems. Its also a good idea to know your test results and keep a list of the medicines you take. How can you care for yourself at home? · Pace yourself. Break up large jobs into smaller tasks. Save harder tasks for days when you have less pain, or go back and forth between hard tasks and easier ones. Take rest breaks. · Relax, and reduce stress. Relaxation techniques such as deep breathing or meditation can help. · Keep moving. Gentle, daily exercise can help reduce pain over the long run. Try low- or no-impact exercises such as walking, swimming, and stationary biking. Do stretches to stay flexible. · Try heat, cold packs, and massage. · Get enough sleep. Chronic pain can make you tired and drain your energy. Talk with your doctor if you have trouble sleeping because of pain. · Think positive. Your thoughts can affect your pain level. Do things that you enjoy to distract yourself when you have pain instead of focusing on the pain.  See a movie, read a book, listen to music, or spend time with a friend. · If you think you are depressed, talk to your doctor about treatment. · Keep a daily pain diary. Record how your moods, thoughts, sleep patterns, activities, and medicine affect your pain. You may find that your pain is worse during or after certain activities or when you are feeling a certain emotion. Having a record of your pain can help you and your doctor find the best ways to treat your pain. · Take pain medicines exactly as directed. ¨ If the doctor gave you a prescription medicine for pain, take it as prescribed. ¨ If you are not taking a prescription pain medicine, ask your doctor if you can take an over-the-counter medicine. Reducing constipation caused by pain medicine  · Include fruits, vegetables, beans, and whole grains in your diet each day. These foods are high in fiber. · Drink plenty of fluids, enough so that your urine is light yellow or clear like water. If you have kidney, heart, or liver disease and have to limit fluids, talk with your doctor before you increase the amount of fluids you drink. · If your doctor recommends it, get more exercise. Walking is a good choice. Bit by bit, increase the amount you walk every day. Try for at least 30 minutes on most days of the week. · Schedule time each day for a bowel movement. A daily routine may help. Take your time and do not strain when having a bowel movement. When should you call for help? Call your doctor now or seek immediate medical care if:  · Your pain gets worse or is out of control. · You feel down or blue, or you do not enjoy things like you once did. You may be depressed, which is common in people with chronic pain. Depression can be treated. · You have vomiting or cramps for more than 2 hours. Watch closely for changes in your health, and be sure to contact your doctor if:  · You cannot sleep because of pain. · You are very worried or anxious about your pain.   · You have trouble taking your pain medicine. · You have any concerns about your pain medicine. · You have trouble with bowel movements, such as:  ¨ No bowel movement in 3 days. ¨ Blood in the anal area, in your stool, or on the toilet paper. ¨ Diarrhea for more than 24 hours. Where can you learn more? Go to http://betyz-didi.info/. Enter N004 in the search box to learn more about \"Chronic Pain: Care Instructions. \"  Current as of: October 14, 2016  Content Version: 11.3  © 8826-6152 [email or web address]. Care instructions adapted under license by [employer] (which disclaims liability or warranty for this information). If you have questions about a medical condition or this instruction, always ask your healthcare professional. [place] [address] any warranty or liability for your use of this information. Elevated Blood Pressure: Care Instructions  Your Care Instructions    Blood pressure is a measure of how hard the blood pushes against the walls of your arteries. It's normal for blood pressure to go up and down throughout the day. But if it stays up over time, you have high blood pressure. Two numbers tell you your blood pressure. The first number is the systolic pressure. It shows how hard the blood pushes when your heart is pumping. The second number is the diastolic pressure. It shows how hard the blood pushes between heartbeats, when your heart is relaxed and filling with blood. An ideal blood pressure in adults is less than 120/80 (say \"120 over 80\"). High blood pressure is 140/90 or higher. You have high blood pressure if your top number is 140 or higher or your bottom number is 90 or higher, or both. The main test for high blood pressure is simple, fast, and painless. To diagnose high blood pressure, your doctor will test your blood pressure at different times.  After testing your blood pressure, your doctor may ask you to test it again when you are home.  If you are diagnosed with high blood pressure, you can work with your doctor to make a long-term plan to manage it. Follow-up care is a key part of your treatment and safety. Be sure to make and go to all appointments, and call your doctor if you are having problems. It's also a good idea to know your test results and keep a list of the medicines you take. How can you care for yourself at home? · Do not smoke. Smoking increases your risk for heart attack and stroke. If you need help quitting, talk to your doctor about stop-smoking programs and medicines. These can increase your chances of quitting for good. · Stay at a healthy weight. · Try to limit how much sodium you eat to less than 2,300 milligrams (mg) a day. Your doctor may ask you to try to eat less than 1,500 mg a day. · Be physically active. Get at least 30 minutes of exercise on most days of the week. Walking is a good choice. You also may want to do other activities, such as running, swimming, cycling, or playing tennis or team sports. · Avoid or limit alcohol. Talk to your doctor about whether you can drink any alcohol. · Eat plenty of fruits, vegetables, and low-fat dairy products. Eat less saturated and total fats. · Learn how to check your blood pressure at home. When should you call for help? Call your doctor now or seek immediate medical care if:  · Your blood pressure is much higher than normal (such as 180/110 or higher). · You think high blood pressure is causing symptoms such as:  ¨ Severe headache. ¨ Blurry vision. Watch closely for changes in your health, and be sure to contact your doctor if:  · You do not get better as expected. Where can you learn more? Go to http://betzy-didi.info/. Enter J342 in the search box to learn more about \"Elevated Blood Pressure: Care Instructions. \"  Current as of: April 3, 2017  Content Version: 11.3  © 2990-8071 [employer].  Care instructions adapted under license by [employer] (which disclaims liability or warranty for this information). If you have questions about a medical condition or this instruction, always ask your healthcare professional. [address] any warranty or liability for your use of this information.

## 2017-08-15 NOTE — Clinical Note
Please return immediately to the Emergency Room for re-evaluation if you are not improving, develop any new symptoms, or develop worsening of current symptoms! If you have been prescribed a medication and are unable to take this medication for any r yue, please return to the Emergency Department for further evaluation! If you have been referred for follow-up to a specialist, but are unable to follow-up and your symptoms are either not improving or are worsening, please return to the Emergency D epartment for further evaluation!

## 2017-08-15 NOTE — ED NOTES
I have reviewed discharge instructions with the patient. The patient verbalized understanding. Patient armband removed and shredded. Pt ambulated without distress. Paper scrubs provided said her  took her clothes home.

## 2017-08-15 NOTE — ED PROVIDER NOTES
HPI Comments: Camila Waters is a 62 y.o. female with a pertinent history of chronic abdominal pain, DM, asthma, anemia, dyskinesia, HTN, GERD, OA who presents to the emergency department for evaluation of multiple complaints - acute on chronic LLQ abdominal pain, bilateral low back pain, diarrhea, chills, cough productive of green sputum, rhinorrhea, sore throat, generalized weakness, nausea without vomiting, headache, SOB, dehydration, and loss of voice. Symptoms x 3 days. She reports she called her PCP and was told to come to the ED. She has percocet at home for her chronic pain, but states she tries not to rely on it too heavily. Pt has had multiple prior intraabdominal surgeries. No treatments pta. Pt denies any fevers, dizziness or light headedness, CP or discomfort, constipation, melena/hematochezia, dysuria, hematuria, frequency, focal weakness/numbness/tingling, or rash. Patient has no other complaints at this time. PCP:  Zo Jacobson MD        Patient is a 62 y.o. female presenting with shortness of breath, abdominal pain, and headaches. Shortness of Breath   Associated symptoms include headaches, rhinorrhea, sore throat, cough and abdominal pain. Pertinent negatives include no fever, no chest pain, no vomiting, no rash and no leg swelling. Abdominal Pain    Associated symptoms include diarrhea, nausea, headaches and back pain. Pertinent negatives include no fever, no vomiting, no constipation, no dysuria, no frequency, no hematuria, no myalgias and no chest pain. Headache    Associated symptoms include shortness of breath, weakness and nausea. Pertinent negatives include no fever, no dizziness and no vomiting.         Past Medical History:   Diagnosis Date    Abdominal adhesions     Anemia     Arthritis     all over the body    Asthma     Asthma     Cocaine abuse     Diabetes ([facility] [address]     Diabetes mellitus     Dyskinesia     bilateral    Essential hypertension     GERD (gastroesophageal reflux disease)     Hypertension     Menopause     Microhematuria     Stool color black        Past Surgical History:   Procedure Laterality Date    HX CHOLECYSTECTOMY  6/28/10    HX COLONOSCOPY      HX ENDOSCOPY      HX HERNIA REPAIR      HX HYSTERECTOMY      Fibroids    HX KNEE REPLACEMENT      HX OOPHORECTOMY  12/2000    HX SMALL BOWEL RESECTION  12/2000    HX SMALL BOWEL RESECTION  02/21/2017    Dr. Krishan Duong           Family History:   Problem Relation Age of Onset    Hypertension Other      parent    Breast Cancer Other 21    Heart Disease Father     Hypertension Father     Diabetes Father     Diabetes Mother     Hypertension Other      sibling    Diabetes Other      parent    Diabetes Sister     Kidney Disease Brother     Diabetes Brother        Social History     Social History    Marital status:      Spouse name: N/A    Number of children: N/A    Years of education: N/A     Occupational History    Not on file. Social History Main Topics    Smoking status: Never Smoker    Smokeless tobacco: Never Used    Alcohol use No    Drug use: No    Sexual activity: Yes     Partners: Male     Birth control/ protection: None     Other Topics Concern    Not on file     Social History Narrative         ALLERGIES: Macrodantin [nitrofurantoin macrocrystalline] and Tape [adhesive]    Review of Systems   Constitutional: Positive for chills. Negative for fever. HENT: Positive for congestion, rhinorrhea, sore throat and voice change. Respiratory: Positive for cough and shortness of breath. Cardiovascular: Negative for chest pain and leg swelling. Gastrointestinal: Positive for abdominal pain, diarrhea and nausea. Negative for blood in stool, constipation and vomiting. Genitourinary: Negative for dysuria, frequency and hematuria. Musculoskeletal: Positive for back pain. Negative for myalgias.    Skin: Negative for rash and wound.   Neurological: Positive for weakness and headaches. Negative for dizziness and numbness. Vitals:    08/15/17 1431   BP: (!) 149/101   Pulse: (!) 108   Resp: 20   Temp: 98.4 °F (36.9 °C)   SpO2: 100%   Weight: 68 kg (150 lb)   Height: 5' (1.524 m)            Physical Exam   Constitutional: She is oriented to person, place, and time. She appears well-developed and well-nourished. Histrionic, appears uncomfortable   HENT:   Head: Normocephalic and atraumatic. Nose: Mucosal edema present. Mouth/Throat: Oropharynx is clear and moist. No oropharyngeal exudate. Minimal mucosal edema noted to nasal turbinates   Eyes: Conjunctivae are normal. Right eye exhibits no discharge. Left eye exhibits no discharge. Neck: Normal range of motion. Neck supple. No thyromegaly present. Cardiovascular: Regular rhythm and normal heart sounds. Tachycardia present. Exam reveals no gallop and no friction rub. No murmur heard. Pulmonary/Chest: Effort normal and breath sounds normal. No respiratory distress. She has no wheezes. She has no rales. She exhibits no tenderness. Lungs CTAB   Abdominal: Soft. Bowel sounds are normal. She exhibits no distension and no mass. There is tenderness. There is no rebound and no guarding. Generally TTP. No rebound, rigidity, guarding, distention, or mass noted. (-) Feldman's sign. (-) Rovsing's sign. Normoactive BS all four quadrants. Musculoskeletal: She exhibits no edema or deformity. Lymphadenopathy:     She has no cervical adenopathy. Neurological: She is alert and oriented to person, place, and time. She has normal reflexes. Skin: Skin is warm and dry. She is not diaphoretic. Psychiatric:   Histrionic behavior    Nursing note and vitals reviewed.        MDM  Number of Diagnoses or Management Options  Chronic abdominal pain: new and requires workup  Cocaine abuse: new and requires workup  Constipation, unspecified constipation type: new and requires workup  Elevated blood pressure reading: new and requires workup  SOB (shortness of breath): new and requires workup  Diagnosis management comments: Differential Diagnosis: appendicitis, ectopic pregnancy, normal pregnancy, TOA, PID, ovarian cyst, endometriosis, endometritis, uterine fibroids, constipation, gastroenteritis, IBS, IBD, celiac disease, diverticulitis, nephrolithiasis, pyelonephritis, cystitis, mesenteric ischemia, mesenteric adenitis, ruptured AAA, SBO, LBO    Plan:  Pt presents ambulatory, but subsequently states she is too weak to walk. She is observed walking between restroom and stretcher in [facility]. She appears well-hydrated, non-toxic in appearance, with elevated HR, elevated BP, and otherwise normal vitals. She presents with multiple medical complaints. Exam of abdomen is benign with generalized TTP, but no peritoneal signs. Pt has had multiple prior intra-abdominal surgeries. UDS today is (+) for cocaine. Otherwise labs are unremarkable/stable. AAS is negative. Do not feel that repeat CT is warranted today. Believe complaints today are more consistent with maintenance of chronic pain. Pt has had 32 CT scans of abdomen/pelvis/chest in the past 10 years. She has had 42 ED/office/urgent care visits in the past year for various pain complaints.  review shows 26 narcotic prescriptions written by 15 prescribers, using 2 different pharmacies in the past year. Behavior here in ED is suspicious for drug-seeking and/or narcotic withdrawal.  She is treated here in ED with morphine, but no narcotic rxs will be written. Advised patient she needs to follow-up with PCP/OB/surgery. She will likely need referral to pain management. At this time, patient is stable and appropriate for discharge home. Patient demonstrates understanding of current diagnoses and is in agreement with the treatment plan.   They are advised that while the likelihood of serious underlying condition is low at this point given the evaluation performed today, we cannot fully rule it out. They are advised to immediately return with any new symptoms or worsening of current condition. All questions have been answered. Patient is given educational material regarding their diagnoses, including danger symptoms and when to return to the ED.        Amount and/or Complexity of Data Reviewed  Clinical lab tests: ordered and reviewed  Tests in the radiology section of CPT®: ordered and reviewed  Review and summarize past medical records: yes    Risk of Complications, Morbidity, and/or Mortality  Presenting problems: moderate  Diagnostic procedures: moderate  Management options: moderate    Patient Progress  Patient progress: improved    ED Course       Procedures    -------------------------------------------------------------------------------------------------------------------  Orders:  Orders Placed This Encounter    XR ABD ACUTE W 1 V CHEST     Standing Status:   Standing     Number of Occurrences:   1     Order Specific Question:   Transport     Answer:   Wheelchair [7]     Order Specific Question:   Reason for Exam     Answer:   SOB, Abdominal pain    CBC WITH AUTOMATED DIFF     Standing Status:   Standing     Number of Occurrences:   1    HEPATIC FUNCTION PANEL     Standing Status:   Standing     Number of Occurrences:   1    LIPASE     Standing Status:   Standing     Number of Occurrences:   1    METABOLIC PANEL, BASIC     Standing Status:   Standing     Number of Occurrences:   1    URINALYSIS W/ RFLX MICROSCOPIC     Standing Status:   Standing     Number of Occurrences:   1    CARDIAC PANEL,(CK, CKMB & TROPONIN)     Standing Status:   Standing     Number of Occurrences:   1    Urine Drug Screen     Standing Status:   Standing     Number of Occurrences:   1    URINE MICROSCOPIC ONLY     Standing Status:   Standing     Number of Occurrences:   1    EKG, 12 LEAD, INITIAL     Standing Status:   Standing     Number of Occurrences:   1     Order Specific Question:   Reason for Exam:     Answer:   shortness of breath    SALINE LOCK IV ONE TIME STAT     Standing Status:   Standing     Number of Occurrences:   1    morphine injection 4 mg    sodium chloride 0.9 % bolus infusion 1,000 mL    polyethylene glycol (MIRALAX) 17 gram/dose powder     Sig: Take 17 g by mouth daily.  1 capful with 8 oz of water daily     Dispense:  119 g     Refill:  0        Lab Results:   Recent Results (from the past 12 hour(s))   EKG, 12 LEAD, INITIAL    Collection Time: 08/15/17  2:29 PM   Result Value Ref Range    Ventricular Rate 105 BPM    Atrial Rate 105 BPM    P-R Interval 130 ms    QRS Duration 82 ms    Q-T Interval 316 ms    QTC Calculation (Bezet) 417 ms    Calculated P Axis 65 degrees    Calculated R Axis 61 degrees    Calculated T Axis 108 degrees    Diagnosis       Sinus tachycardia  Nonspecific T wave abnormality  Abnormal ECG  When compared with ECG of 25-JUL-2017 04:10,  No significant change was found     URINALYSIS W/ RFLX MICROSCOPIC    Collection Time: 08/15/17  3:00 PM   Result Value Ref Range    Color YELLOW      Appearance CLEAR      Specific gravity 1.029 1.005 - 1.030      pH (UA) 6.5 5.0 - 8.0      Protein NEGATIVE  NEG mg/dL    Glucose >1000 (A) NEG mg/dL    Ketone NEGATIVE  NEG mg/dL    Bilirubin NEGATIVE  NEG      Blood MODERATE (A) NEG      Urobilinogen 0.2 0.2 - 1.0 EU/dL    Nitrites NEGATIVE  NEG      Leukocyte Esterase NEGATIVE  NEG     DRUG SCREEN, URINE    Collection Time: 08/15/17  3:00 PM   Result Value Ref Range    BENZODIAZEPINE NEGATIVE  NEG      BARBITURATES NEGATIVE  NEG      THC (TH-CANNABINOL) NEGATIVE  NEG      OPIATES NEGATIVE  NEG      PCP(PHENCYCLIDINE) NEGATIVE  NEG      COCAINE POSITIVE (A) NEG      AMPHETAMINES NEGATIVE  NEG      METHADONE NEGATIVE       HDSCOM (NOTE)    URINE MICROSCOPIC ONLY    Collection Time: 08/15/17  3:00 PM   Result Value Ref Range    WBC NEGATIVE  0 - 4 /hpf    RBC 11 to 20 0 - 5 /hpf    Epithelial cells 1+ 0 - 5 /lpf    Bacteria NEGATIVE  NEG /hpf   CBC WITH AUTOMATED DIFF    Collection Time: 08/15/17  3:25 PM   Result Value Ref Range    WBC 10.3 4.6 - 13.2 K/uL    RBC 4.58 4.20 - 5.30 M/uL    HGB 13.5 12.0 - 16.0 g/dL    HCT 40.2 35.0 - 45.0 %    MCV 87.8 74.0 - 97.0 FL    MCH 29.5 24.0 - 34.0 PG    MCHC 33.6 31.0 - 37.0 g/dL    RDW 13.8 11.6 - 14.5 %    PLATELET 576 415 - 236 K/uL    MPV 10.1 9.2 - 11.8 FL    NEUTROPHILS 68 40 - 73 %    LYMPHOCYTES 23 21 - 52 %    MONOCYTES 8 3 - 10 %    EOSINOPHILS 1 0 - 5 %    BASOPHILS 0 0 - 2 %    ABS. NEUTROPHILS 7.0 1.8 - 8.0 K/UL    ABS. LYMPHOCYTES 2.4 0.9 - 3.6 K/UL    ABS. MONOCYTES 0.8 0.05 - 1.2 K/UL    ABS. EOSINOPHILS 0.1 0.0 - 0.4 K/UL    ABS. BASOPHILS 0.0 0.0 - 0.06 K/UL    DF AUTOMATED     HEPATIC FUNCTION PANEL    Collection Time: 08/15/17  3:25 PM   Result Value Ref Range    Protein, total 6.8 6.4 - 8.2 g/dL    Albumin 2.8 (L) 3.4 - 5.0 g/dL    Globulin 4.0 2.0 - 4.0 g/dL    A-G Ratio 0.7 (L) 0.8 - 1.7      Bilirubin, total 0.3 0.2 - 1.0 MG/DL    Bilirubin, direct <0.1 0.0 - 0.2 MG/DL    Alk.  phosphatase 109 45 - 117 U/L    AST (SGOT) 10 (L) 15 - 37 U/L    ALT (SGPT) 26 13 - 56 U/L   LIPASE    Collection Time: 08/15/17  3:25 PM   Result Value Ref Range    Lipase 268 73 - 520 U/L   METABOLIC PANEL, BASIC    Collection Time: 08/15/17  3:25 PM   Result Value Ref Range    Sodium 141 136 - 145 mmol/L    Potassium 4.1 3.5 - 5.5 mmol/L    Chloride 108 100 - 108 mmol/L    CO2 27 21 - 32 mmol/L    Anion gap 6 3.0 - 18 mmol/L    Glucose 229 (H) 74 - 99 mg/dL    BUN 18 7.0 - 18 MG/DL    Creatinine 1.09 0.6 - 1.3 MG/DL    BUN/Creatinine ratio 17 12 - 20      GFR est AA >60 >60 ml/min/1.73m2    GFR est non-AA 52 (L) >60 ml/min/1.73m2    Calcium 8.8 8.5 - 10.1 MG/DL   CARDIAC PANEL,(CK, CKMB & TROPONIN)    Collection Time: 08/15/17  3:25 PM   Result Value Ref Range    CK 42 26 - 192 U/L    CK - MB <1.0 <3.6 ng/ml    CK-MB Index  0.0 - 4.0 % CALCULATION NOT PERFORMED WHEN RESULT IS BELOW LINEAR LIMIT    Troponin-I, Qt. <0.02 0.0 - 0.045 NG/ML       Radiology Results:  Xr Abd Acute W 1 V Chest    Result Date: 8/15/2017  PROCEDURE: PA chest x-ray with abdominal series CLINICAL HISTORY: Abdominal pain with shortness of breath COMPARISON: June 26, 2017 radiographs FINDINGS: Frontal chest demonstrates clear lungs. Cardiac, hilar and mediastinal contours are normal. No acute bony abnormality. Old left posterior rib fracture noted. Abdominal series (supine and upright radiographs)  demonstrates moderate retained stool. Bowel gas pattern is nonobstructive. No free air. No organomegaly or suspicious calcification. No acute bony abnormality. Surgical clips within the right upper quadrant and left pelvis are present. IMPRESSION: 1. No acute findings. Progress Notes:  3:41 PM:  Mushtaq Hodges PA-C was at the pt's bedside, assessed the pt and answered the pt's questions regarding treatment.    -------------------------------------------------------------------------------------------------------------------    Disposition:  Diagnosis:   1. Chronic abdominal pain    2. SOB (shortness of breath)    3. Cocaine abuse    4. Elevated blood pressure reading    5. Constipation, unspecified constipation type        Disposition: [place] Home    Follow-up Information     Follow up With Details Comments Contact Info    Emerald Bettencourt MD Call in 1 day For follow-up [address] [facility]  [address] [facility] EMERGENCY DEPT Go to As needed, If symptoms worsen [phone] [address] [phone]          Patient's Medications   Start Taking    POLYETHYLENE GLYCOL (MIRALAX) 17 GRAM/DOSE POWDER    Take 17 g by mouth daily.  1 capful with 8 oz of water daily   Continue Taking    ALBUTEROL (PROVENTIL HFA, VENTOLIN HFA, PROAIR HFA) 90 MCG/ACTUATION INHALER    Take 1 Puff by inhalation every six (6) hours as needed for Wheezing. AMLODIPINE (NORVASC) 2.5 MG TABLET    Take 1 Tab by mouth nightly. ATORVASTATIN (LIPITOR) 40 MG TABLET    Take 1 Tab by mouth nightly. BISOPROLOL-HYDROCHLOROTHIAZIDE (ZIAC) 2.5-6.25 MG PER TABLET    Take 1 Tab by mouth daily. DAPAGLIFLOZIN (FARXIGA) 10 MG TAB    Take 1 Tab by mouth daily. Indications: type 2 diabetes mellitus    GLUCOSE BLOOD VI TEST STRIPS (ONETOUCH ULTRA TEST) STRIP    Check blood glucose as directed    METFORMIN (GLUMETZA) 500 MG TG24 24 HOUR TABLET    Take 500 mg by mouth two (2) times a day. OMEPRAZOLE (PRILOSEC) 10 MG CAPSULE    Take 10 mg by mouth daily. ONDANSETRON HCL (ZOFRAN, AS HYDROCHLORIDE,) 4 MG TABLET    Take 1 Tab by mouth every eight (8) hours as needed for Nausea. OXYCODONE-ACETAMINOPHEN (PERCOCET) 5-325 MG PER TABLET    Take 1 Tab by mouth every six (6) hours as needed for Pain. Max Daily Amount: 4 Tabs.    These Medications have changed    No medications on file   Stop Taking    No medications on file

## 2017-08-15 NOTE — TELEPHONE ENCOUNTER
Patient called having trouble breathing gasping for air noted. States she has diarrhea , headache and trouble breathing. After speaking with Jared Baltazar patient was told to go to ER.

## 2017-08-16 ENCOUNTER — HOSPITAL ENCOUNTER (OUTPATIENT)
Dept: PHYSICAL THERAPY | Age: 57
End: 2017-08-16

## 2017-08-16 LAB
ATRIAL RATE: 105 BPM
CALCULATED P AXIS, ECG09: 65 DEGREES
CALCULATED R AXIS, ECG10: 61 DEGREES
CALCULATED T AXIS, ECG11: 108 DEGREES
DIAGNOSIS, 93000: NORMAL
P-R INTERVAL, ECG05: 130 MS
Q-T INTERVAL, ECG07: 316 MS
QRS DURATION, ECG06: 82 MS
QTC CALCULATION (BEZET), ECG08: 417 MS
VENTRICULAR RATE, ECG03: 105 BPM

## 2017-09-21 RX ORDER — ALBUTEROL SULFATE 90 UG/1
1 AEROSOL, METERED RESPIRATORY (INHALATION)
Qty: 1 INHALER | Refills: 0 | Status: SHIPPED | OUTPATIENT
Start: 2017-09-21 | End: 2018-12-06 | Stop reason: SDUPTHER

## 2017-11-07 ENCOUNTER — OFFICE VISIT (OUTPATIENT)
Dept: FAMILY MEDICINE CLINIC | Age: 57
End: 2017-11-07

## 2017-11-07 VITALS
BODY MASS INDEX: 30.63 KG/M2 | SYSTOLIC BLOOD PRESSURE: 134 MMHG | WEIGHT: 156 LBS | OXYGEN SATURATION: 97 % | HEIGHT: 60 IN | DIASTOLIC BLOOD PRESSURE: 92 MMHG | TEMPERATURE: 98.1 F | RESPIRATION RATE: 16 BRPM | HEART RATE: 84 BPM

## 2017-11-07 DIAGNOSIS — B02.9 HERPES ZOSTER WITHOUT COMPLICATION: Primary | ICD-10-CM

## 2017-11-07 DIAGNOSIS — E11.65 TYPE 2 DIABETES MELLITUS WITH HYPERGLYCEMIA, WITHOUT LONG-TERM CURRENT USE OF INSULIN (HCC): ICD-10-CM

## 2017-11-07 DIAGNOSIS — I10 ESSENTIAL HYPERTENSION: ICD-10-CM

## 2017-11-07 DIAGNOSIS — Z12.31 ENCOUNTER FOR SCREENING MAMMOGRAM FOR BREAST CANCER: ICD-10-CM

## 2017-11-07 RX ORDER — VALACYCLOVIR HYDROCHLORIDE 1 G/1
TABLET, FILM COATED ORAL
Refills: 0 | COMMUNITY
Start: 2017-11-06 | End: 2017-11-22 | Stop reason: ALTCHOICE

## 2017-11-07 NOTE — MR AVS SNAPSHOT
Visit Information Date & Time Provider Department Dept. Phone Encounter #  
 11/7/2017  7:30 AM Janice Peng, [address] [facility] [phone] Follow-up Instructions Return in about 3 months (around 2/7/2018), or if symptoms worsen or fail to improve, for diabetes. Your Appointments 11/22/2017  9:00 AM  
Follow Up with Janice Peng MD  
[address]) Appt Note: spasms and needs med refills [address] [facility] [address] [place] [address]  
  
   
 [address] [facility] [address]  
  
    
  
 1/23/2018  3:30 PM  
Any with Bernice Khan MD  
[facility] [address]) [address] [place] [address] [phone]  
  
   
 [facility] [address] [id number] Upcoming Health Maintenance Date Due Hepatitis C Screening 1960 Pneumococcal 19-64 Medium Risk (1 of 1 - PPSV23) 2/4/1979 DTaP/Tdap/Td series (1 - Tdap) 2/4/1981 PAP AKA CERVICAL CYTOLOGY 2/4/1981 HEMOGLOBIN A1C Q6M 2/2/2018 FOOT EXAM Q1 3/27/2018 MICROALBUMIN Q1 3/27/2018 LIPID PANEL Q1 3/27/2018 EYE EXAM RETINAL OR DILATED Q1 5/29/2018 BREAST CANCER SCRN MAMMOGRAM 1/6/2019 COLONOSCOPY 3/3/2026 Allergies as of 11/7/2017  Review Complete On: 11/7/2017 By: Janice Peng MD  
  
 Severity Noted Reaction Type Reactions Macrodantin [Nitrofurantoin Macrocrystalline]  01/31/2012    Itching, Other (comments) Tape [Adhesive]  09/30/2014    Other (comments) Paper tape-- feels like burning skin Burned skin when had wound vac Current Immunizations  Reviewed on 2/22/2017 Name Date Influenza Vaccine 11/15/2016 Not reviewed this visit You Were Diagnosed With   
  
 Codes Comments Herpes zoster without complication    -  Primary ICD-10-CM: B02.9 ICD-9-CM: 053.9 Type 2 diabetes mellitus with hyperglycemia, without long-term current use of insulin (HCC)     ICD-10-CM: E11.65 ICD-9-CM: 250.00, 790.29 Essential hypertension     ICD-10-CM: I10 
ICD-9-CM: 401.9 Encounter for screening mammogram for breast cancer     ICD-10-CM: Z12.31 
ICD-9-CM: V76.12 Vitals BP Pulse Temp Resp Height(growth percentile) Weight(growth percentile) (!) 134/92 (BP 1 Location: Left arm, BP Patient Position: Sitting) 84 98.1 °F (36.7 °C) (Oral) 16 5' (1.524 m) 156 lb (70.8 kg) SpO2 BMI OB Status Smoking Status 97% 30.47 kg/m2 Hysterectomy Never Smoker Vitals History BMI and BSA Data Body Mass Index Body Surface Area  
 30.47 kg/m 2 1.73 m 2 Preferred Pharmacy Pharmacy Name Phone [facility]  [facility] [phone] Your Updated Medication List  
  
   
This list is accurate as of: 11/7/17  8:11 AM.  Always use your most recent med list.  
  
  
  
  
 albuterol 90 mcg/actuation inhaler Commonly known as:  PROVENTIL HFA, VENTOLIN HFA, PROAIR HFA Take 1 Puff by inhalation every six (6) hours as needed for Wheezing. amLODIPine 2.5 mg tablet Commonly known as:  Javed Del Take 1 Tab by mouth nightly. atorvastatin 40 mg tablet Commonly known as:  LIPITOR Take 1 Tab by mouth nightly. dapagliflozin 10 mg Tab tablet Commonly known as:  [employer] Take 1 Tab by mouth daily. Indications: type 2 diabetes mellitus  
  
 glucose blood VI test strips strip Commonly known as:  ONETOUCH ULTRA TEST Check blood glucose as directed GLUMETZA 500 mg Tg24 24 hour tablet Generic drug:  metFORMIN Take 500 mg by mouth two (2) times a day. omeprazole 10 mg capsule Commonly known as:  PRILOSEC Take 10 mg by mouth daily. ondansetron hcl 4 mg tablet Commonly known as:  ZOFRAN (AS HYDROCHLORIDE) Take 1 Tab by mouth every eight (8) hours as needed for Nausea. oxyCODONE-acetaminophen 5-325 mg per tablet Commonly known as:  PERCOCET Take 1 Tab by mouth every six (6) hours as needed for Pain. Max Daily Amount: 4 Tabs. polyethylene glycol 17 gram/dose powder Commonly known as:  Robbi Sport Take 17 g by mouth daily. 1 capful with 8 oz of water daily  
  
 valACYclovir 1 gram tablet Commonly known as:  VALTREX  
take 1 tablet by mouth three times a day for 7 days ZIAC 2.5-6.25 mg per tablet Generic drug:  bisoprolol-hydroCHLOROthiazide Take 1 Tab by mouth daily. Follow-up Instructions Return in about 3 months (around 2/7/2018), or if symptoms worsen or fail to improve, for diabetes. To-Do List   
 11/07/2017 Imaging:  DIOR MAMMO BI SCREENING INCL CAD Introducing [facility]! [employer] introduces [employer] patient portal. Now you can access parts of your medical record, email your doctor's office, and request medication refills online. 1. In your internet browser, go to [email or web address] 2. Click on the First Time User? Click Here link in the Sign In box. You will see the New Member Sign Up page. 3. Enter your [employer] Access Code exactly as it appears below. You will not need to use this code after youve completed the sign-up process. If you do not sign up before the expiration date, you must request a new code. · [employer] Access Code: [email or web address] Expires: 2/5/2018  7:27 AM 
 
4. Enter the last four digits of your Social Security Number (xxxx) and Date of Birth (mm/dd/yyyy) as indicated and click Submit. You will be taken to the next sign-up page. 5. Create a [employer] ID. This will be your [employer] login ID and cannot be changed, so think of one that is secure and easy to remember. 6. Create a [employer] password. You can change your password at any time. 7. Enter your Password Reset Question and Answer. This can be used at a later time if you forget your password. 8. Enter your e-mail address. You will receive e-mail notification when new information is available in [address] 9. Click Sign Up. You can now view and download portions of your medical record. 10. Click the Download Summary menu link to download a portable copy of your medical information. If you have questions, please visit the Frequently Asked Questions section of the [employer] website. Remember, [employer] is NOT to be used for urgent needs. For medical emergencies, dial 911. Now available from your iPhone and Android! Please provide this summary of care documentation to your next provider. Your primary care clinician is listed as Aron Miguel. If you have any questions after today's visit, please call [phone].

## 2017-11-07 NOTE — LETTER
NOTIFICATION RETURN TO WORK  
 
11/7/2017 8:10 AM 
 
Ms. Thom Lange [address] [facility] [address] [phone] To Whom It May Concern: 
 
Thom Lange is currently under the care of [address] [place] [address]. She will return to work on: 11/8/2017 If there are questions or concerns please have the patient contact our office. Sincerely, Chandrika Elliott MD

## 2017-11-07 NOTE — PROGRESS NOTES
Edwar Azevedo is a 62 y.o.  female and presents with    Chief Complaint   Patient presents with    Shingles       Subjective:  Rash  Patient complains of rash involving the armsleft, trunk. Rash started 3 day ago. Appearance of rash at onset: Color of lesion(s): pink. Rash has changed over time Initial distribution: armsleft. Discomfort associated with rash: painful. Associated symptoms: no associated symptoms. Denies: fever, cough, congestion, sore throat, headache, abdominal pain, nausea, vomiting, myalgia, crankiness, decrease in appetite. Patient has had previous evaluation of rash. Patient has had previous treatment. Response to treatment: she is somewhat improved after going to the ER 2 days ago and receiving valacyclovir and oxycodon. Patient has not had contacts with similar rash. Patient has not identified precipitant. Patient has not had new exposures (soaps, lotions, laundry detergents, foods, medications, plants, insects or animals.)    Diabetes Mellitus:  She has diabetes mellitus, and  hypertension, hyperlipidemia and obesity. Diabetic ROS - medication compliance: compliant all of the time, diabetic diet compliance: compliant most of the time.    Lab review: labs reviewed, I note that glycosylated hemoglobin normal.     Patient Active Problem List   Diagnosis Code    Osteoarthritis of left knee M17.12    Hypertension I10    Hyperlipidemia E78.5    GERD (gastroesophageal reflux disease) K21.9    Asthma J45.909    Type 2 diabetes mellitus ([facility] E11.9    SBO (small bowel obstruction) K56.609    Chronic abdominal pain R10.9, G89.29    Post-operative pain G89.18    Chest pain R07.9    Essential hypertension, benign I10    Sural neuritis G57.80     Patient Active Problem List    Diagnosis Date Noted    Chronic abdominal pain 03/14/2017    Post-operative pain 03/14/2017    SBO (small bowel obstruction) 02/21/2017    Osteoarthritis of left knee 06/27/2016    Hypertension 06/27/2016    Hyperlipidemia 06/27/2016    GERD (gastroesophageal reflux disease) 06/27/2016    Asthma 06/27/2016    Type 2 diabetes mellitus ([facility] 06/27/2016    Sural neuritis 06/23/2014    Chest pain 04/20/2014    Essential hypertension, benign 02/27/2012     Current Outpatient Prescriptions   Medication Sig Dispense Refill    albuterol (PROVENTIL HFA, VENTOLIN HFA, PROAIR HFA) 90 mcg/actuation inhaler Take 1 Puff by inhalation every six (6) hours as needed for Wheezing. 1 Inhaler 0    polyethylene glycol (MIRALAX) 17 gram/dose powder Take 17 g by mouth daily. 1 capful with 8 oz of water daily 119 g 0    oxyCODONE-acetaminophen (PERCOCET) 5-325 mg per tablet Take 1 Tab by mouth every six (6) hours as needed for Pain. Max Daily Amount: 4 Tabs. 12 Tab 0    ondansetron hcl (ZOFRAN, AS HYDROCHLORIDE,) 4 mg tablet Take 1 Tab by mouth every eight (8) hours as needed for Nausea. 12 Tab 0    atorvastatin (LIPITOR) 40 mg tablet Take 1 Tab by mouth nightly. 90 Tab 3    amLODIPine (NORVASC) 2.5 mg tablet Take 1 Tab by mouth nightly. 90 Tab 1    dapagliflozin (FARXIGA) 10 mg tab Take 1 Tab by mouth daily. Indications: type 2 diabetes mellitus 90 Tab 3    glucose blood VI test strips (ONETOUCH ULTRA TEST) strip Check blood glucose as directed 100 Strip 1    bisoprolol-hydrochlorothiazide (ZIAC) 2.5-6.25 mg per tablet Take 1 Tab by mouth daily.  omeprazole (PRILOSEC) 10 mg capsule Take 10 mg by mouth daily.  metFORMIN (GLUMETZA) 500 mg TG24 24 hour tablet Take 500 mg by mouth two (2) times a day.       valACYclovir (VALTREX) 1 gram tablet take 1 tablet by mouth three times a day for 7 days  0     Allergies   Allergen Reactions    Macrodantin [Nitrofurantoin Macrocrystalline] Itching and Other (comments)    Tape [Adhesive] Other (comments)     Paper tape-- feels like burning skin  Burned skin when had wound vac     Past Medical History:   Diagnosis Date    Abdominal adhesions     Anemia  Arthritis     all over the body    Asthma     Asthma     Cocaine abuse     [facility]     Diabetes mellitus     Dyskinesia     bilateral    Essential hypertension     GERD (gastroesophageal reflux disease)     Hypertension     Menopause     Microhematuria     Stool color black      Past Surgical History:   Procedure Laterality Date    HX CHOLECYSTECTOMY  6/28/10    HX COLONOSCOPY      HX ENDOSCOPY      HX HERNIA REPAIR      HX HYSTERECTOMY      Fibroids    HX KNEE REPLACEMENT      HX OOPHORECTOMY  12/2000    HX SMALL BOWEL RESECTION  12/2000    HX SMALL BOWEL RESECTION  02/21/2017    Dr. José Hawkins       Family History   Problem Relation Age of Onset    Hypertension Other      parent    Breast Cancer Other 21    Heart Disease Father     Hypertension Father     Diabetes Father     Diabetes Mother     Hypertension Other      sibling    Diabetes Other      parent    Diabetes Sister     Kidney Disease Brother     Diabetes Brother      Social History   Substance Use Topics    Smoking status: Never Smoker    Smokeless tobacco: Never Used    Alcohol use No       ROS   General ROS: negative for - chills or fever  Psychological ROS: negative for - anxiety or depression  Ophthalmic ROS: positive for - uses glasses  ENT ROS: negative for - headaches  Endocrine ROS: negative for - polydipsia/polyuria or temperature intolerance  Respiratory ROS: no cough, shortness of breath, or wheezing  Cardiovascular ROS: no chest pain or dyspnea on exertion  Gastrointestinal ROS: positive for - abdominal pain  Genito-Urinary ROS: no dysuria, trouble voiding, or hematuria  Musculoskeletal ROS: negative for - joint pain or muscle pain  Neurological ROS: negative for - numbness/tingling or weakness    All other systems reviewed and are negative.       Objective:Vitals:    11/07/17 0730   BP: (!) 134/92   Pulse: 84   Resp: 16   Temp: 98.1 °F (36.7 °C)   TempSrc: Oral SpO2: 97%   Weight: 156 lb (70.8 kg)   Height: 5' (1.524 m)   PainSc:   9   PainLoc: Generalized       alert, well appearing, and in no distress, oriented to person, place, and time and obese  Mental status - normal mood, behavior, speech, dress, motor activity, and thought processes  Chest - clear to auscultation, no wheezes, rales or rhonchi, symmetric air entry  Heart - normal rate, regular rhythm, normal S1, S2, no murmurs, rubs, clicks or gallops  Neurological - cranial nerves II through XII intact, normal gait and station  Skin - LESIONS NOTED: vesicles on the left chest and arm    Assessment/Plan:    1. Herpes zoster without complication  Start antiviral; continue pain management    2. Type 2 diabetes mellitus with hyperglycemia, without long-term current use of insulin (HCC)  Goal hgb a1c <7; currently at goal; recheck in 3 months    3. Essential hypertension  Goal <140/90; borderline controled; continue current treatment      Lab review: labs reviewed, I note that glycosylated hemoglobin mildly abnormal but acceptable      I have discussed the diagnosis with the patient and the intended plan as seen in the above orders. The patient has received an after-visit summary and questions were answered concerning future plans. I have discussed medication side effects and warnings with the patient as well. I have reviewed the plan of care with the patient, accepted their input and they are in agreement with the treatment goals. Follow-up Disposition:  Return in about 3 months (around 2/7/2018), or if symptoms worsen or fail to improve, for diabetes.

## 2017-11-07 NOTE — PROGRESS NOTES
Brando Mancilla is a 62 y.o. female  Chief Complaint   Patient presents with    Shingles     1. Have you been to the ER, urgent care clinic since your last visit? Hospitalized since your last visit? Yes Reason for visit: [facility], 11/05/17, shingles    2. Have you seen or consulted any other health care providers outside of the [address] [place] [address] since your last visit? Include any pap smears or colon screening.  No

## 2017-11-22 ENCOUNTER — OFFICE VISIT (OUTPATIENT)
Dept: FAMILY MEDICINE CLINIC | Age: 57
End: 2017-11-22

## 2017-11-22 VITALS
HEART RATE: 91 BPM | RESPIRATION RATE: 16 BRPM | SYSTOLIC BLOOD PRESSURE: 131 MMHG | BODY MASS INDEX: 30.9 KG/M2 | OXYGEN SATURATION: 100 % | WEIGHT: 157.4 LBS | TEMPERATURE: 97.7 F | DIASTOLIC BLOOD PRESSURE: 82 MMHG | HEIGHT: 60 IN

## 2017-11-22 DIAGNOSIS — G89.29 CHRONIC ABDOMINAL PAIN: ICD-10-CM

## 2017-11-22 DIAGNOSIS — E11.65 TYPE 2 DIABETES MELLITUS WITH HYPERGLYCEMIA, WITHOUT LONG-TERM CURRENT USE OF INSULIN (HCC): ICD-10-CM

## 2017-11-22 DIAGNOSIS — M75.41 SHOULDER IMPINGEMENT, RIGHT: Primary | ICD-10-CM

## 2017-11-22 DIAGNOSIS — R10.9 CHRONIC ABDOMINAL PAIN: ICD-10-CM

## 2017-11-22 DIAGNOSIS — K21.9 GASTROESOPHAGEAL REFLUX DISEASE WITHOUT ESOPHAGITIS: Chronic | ICD-10-CM

## 2017-11-22 DIAGNOSIS — B02.9 HERPES ZOSTER WITHOUT COMPLICATION: ICD-10-CM

## 2017-11-22 DIAGNOSIS — M25.562 ACUTE PAIN OF LEFT KNEE: ICD-10-CM

## 2017-11-22 RX ORDER — OMEPRAZOLE 10 MG/1
10 CAPSULE, DELAYED RELEASE ORAL DAILY
Qty: 30 CAP | Refills: 11 | Status: SHIPPED | OUTPATIENT
Start: 2017-11-22 | End: 2018-12-26

## 2017-11-22 RX ORDER — OXYCODONE AND ACETAMINOPHEN 5; 325 MG/1; MG/1
1 TABLET ORAL
Qty: 12 TAB | Refills: 0 | Status: CANCELLED | OUTPATIENT
Start: 2017-11-22

## 2017-11-22 RX ORDER — CETIRIZINE HCL 10 MG
10 TABLET ORAL DAILY
Qty: 30 TAB | Refills: 1 | Status: SHIPPED | OUTPATIENT
Start: 2017-11-22 | End: 2018-01-22 | Stop reason: CLARIF

## 2017-11-22 NOTE — LETTER
NOTIFICATION RETURN TO WORK / SCHOOL 
 
11/22/2017 9:10 AM 
 
Ms. Frank Reeves [address] [facility] [id number] To Whom It May Concern: 
 
Frank Reeves is currently under the care of Diana Carpio. She will return to work/school on: 11/27/2017. Appointment was on 11/22/2017 If there are questions or concerns please have the patient contact our office. Sincerely, Zander Granados MD
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
## 2017-11-22 NOTE — PROGRESS NOTES
John Dejesus is a 62 y.o female that is present in the office for a routine appointment for shingles follow up and right shoulder pain. 1. Have you been to the ER, urgent care clinic since your last visit? Hospitalized since your last visit? No    2. Have you seen or consulted any other health care providers outside of the [address] [place] [address] since your last visit? Include any pap smears or colon screening.  No

## 2017-11-22 NOTE — MR AVS SNAPSHOT
Visit Information Date & Time Provider Department Dept. Phone Encounter #  
 11/22/2017  9:00 AM Akua Quesada, [address] [facility] [phone] [id number] Follow-up Instructions Return if symptoms worsen or fail to improve. 1/23/2018  3:30 PM  
Any with Karen Polk MD  
[facility]) [address] [facility] [address]  
[phone]  
  
   
 [place] [id number] Upcoming Health Maintenance Date Due Hepatitis C Screening 1960 Pneumococcal 19-64 Medium Risk (1 of 1 - PPSV23) 2/4/1979 DTaP/Tdap/Td series (1 - Tdap) 2/4/1981 PAP AKA CERVICAL CYTOLOGY 2/4/1981 HEMOGLOBIN A1C Q6M 2/2/2018 FOOT EXAM Q1 3/27/2018 MICROALBUMIN Q1 3/27/2018 LIPID PANEL Q1 3/27/2018 EYE EXAM RETINAL OR DILATED Q1 5/29/2018 BREAST CANCER SCRN MAMMOGRAM 1/6/2019 COLONOSCOPY 3/3/2026 Allergies as of 11/22/2017  Review Complete On: 11/22/2017 By: Akua Quesada MD  
  
 Severity Noted Reaction Type Reactions Macrodantin [Nitrofurantoin Macrocrystalline]  01/31/2012    Itching, Other (comments) Tape [Adhesive]  09/30/2014    Other (comments) Paper tape-- feels like burning skin Burned skin when had wound vac Current Immunizations  Reviewed on 2/22/2017 Name Date Influenza Vaccine 11/15/2016 Not reviewed this visit You Were Diagnosed With   
  
 Codes Comments Shoulder impingement, right    -  Primary ICD-10-CM: M75.41 
ICD-9-CM: 726.2 Chronic abdominal pain     ICD-10-CM: R10.9, G89.29 ICD-9-CM: 789.00, 338.29 Type 2 diabetes mellitus with hyperglycemia, without long-term current use of insulin (HCC)     ICD-10-CM: E11.65 ICD-9-CM: 250.00, 790.29 Herpes zoster without complication     YNT-19-ZN: B02.9 ICD-9-CM: 053.9 Acute pain of left knee     ICD-10-CM: M25.562 ICD-9-CM: 719.46   
 Gastroesophageal reflux disease without esophagitis     ICD-10-CM: K21.9 ICD-9-CM: 530.81 Vitals BP Pulse Temp Resp Height(growth percentile) Weight(growth percentile) 131/82 (BP 1 Location: Right arm, BP Patient Position: Sitting) 91 97.7 °F (36.5 °C) (Oral) 16 5' (1.524 m) 157 lb 6.4 oz (71.4 kg) SpO2 BMI OB Status Smoking Status 100% 30.74 kg/m2 Hysterectomy Never Smoker BMI and BSA Data Body Mass Index Body Surface Area 30.74 kg/m 2 1.74 m 2 Preferred Pharmacy Pharmacy Name Phone [facility] [address]  [facility] [phone] Your Updated Medication List  
  
   
This list is accurate as of: 11/22/17  9:09 AM.  Always use your most recent med list.  
  
  
  
  
 albuterol 90 mcg/actuation inhaler Commonly known as:  PROVENTIL HFA, VENTOLIN HFA, PROAIR HFA Take 1 Puff by inhalation every six (6) hours as needed for Wheezing. amLODIPine 2.5 mg tablet Commonly known as:  Gopal Ruths Take 1 Tab by mouth nightly. atorvastatin 40 mg tablet Commonly known as:  LIPITOR Take 1 Tab by mouth nightly. cetirizine 10 mg tablet Commonly known as:  ZYRTEC Take 1 Tab by mouth daily. dapagliflozin 10 mg Tab tablet Commonly known as:  [employer] Take 1 Tab by mouth daily. Indications: type 2 diabetes mellitus  
  
 glucose blood VI test strips strip Commonly known as:  ONETOUCH ULTRA TEST Check blood glucose as directed GLUMETZA 500 mg Tg24 24 hour tablet Generic drug:  metFORMIN Take 500 mg by mouth two (2) times a day. omeprazole 10 mg capsule Commonly known as:  PRILOSEC Take 1 Cap by mouth daily. ondansetron hcl 4 mg tablet Commonly known as:  ZOFRAN (AS HYDROCHLORIDE) Take 1 Tab by mouth every eight (8) hours as needed for Nausea. polyethylene glycol 17 gram/dose powder Commonly known as:  Virgilio Murcia Take 17 g by mouth daily. 1 capful with 8 oz of water daily ZIAC 2.5-6.25 mg per tablet Generic drug:  bisoprolol-hydroCHLOROthiazide Take 1 Tab by mouth daily. Prescriptions Sent to Pharmacy Refills  
 dapagliflozin (FARXIGA) 10 mg tab tablet 3 Sig: Take 1 Tab by mouth daily. Indications: type 2 diabetes mellitus Class: Normal  
 Pharmacy: [address] Ph #: [phone] Route: Oral  
 omeprazole (PRILOSEC) 10 mg capsule 11 Sig: Take 1 Cap by mouth daily. Class: Normal  
 Pharmacy: [address] Ph #: [phone] Route: Oral  
 cetirizine (ZYRTEC) 10 mg tablet 1 Sig: Take 1 Tab by mouth daily. Class: Normal  
 Pharmacy: [address] Ph #: [phone] Route: Oral  
  
Follow-up Instructions Return if symptoms worsen or fail to improve. To-Do List   
 11/24/2017 10:45 AM  
  Appointment with [facility] 1 at [facility] ([phone]) OUTSIDE FILMS  - Any outside films related to the study being scheduled should be brought with you on the day of the exam.  If this cannot be done there may be a delay in the reading of the study. MEDICATIONS  - Patient must bring a complete list of all medications currently taking to include prescriptions, over-the-counter meds, herbals, vitamins & any dietary supplements  GENERAL INSTRUCTIONS  - On the day of your exam do not use any bath powder, deodorant or lotions on the armpit area. -Tenderness of breasts may cause an increase of discomfort during procedure. If you are experiencing breast tenderness on the day of your appointment and would like to reschedule, please call [phone]. Introducing \[facility]\"" & HEALTH SERVICES!    
 Missy Carrillo introduces [facility] patient portal. Now you can access parts of your medical record, email your doctor's office, and request medication refills online. 1. In your internet browser, go to [email or web address] 2. Click on the First Time User? Click Here link in the Sign In box. You will see the New Member Sign Up page. 3. Enter your [employer] Access Code exactly as it appears below. You will not need to use this code after youve completed the sign-up process. If you do not sign up before the expiration date, you must request a new code. · [employer] Access Code: [email or web address] Expires: 2/5/2018  7:27 AM 
 
4. Enter the last four digits of your Social Security Number (xxxx) and Date of Birth (mm/dd/yyyy) as indicated and click Submit. You will be taken to the next sign-up page. 5. Create a [employer] ID. This will be your [employer] login ID and cannot be changed, so think of one that is secure and easy to remember. 6. Create a [employer] password. You can change your password at any time. 7. Enter your Password Reset Question and Answer. This can be used at a later time if you forget your password. 8. Enter your e-mail address. You will receive e-mail notification when new information is available in [address] 9. Click Sign Up. You can now view and download portions of your medical record. 10. Click the Download Summary menu link to download a portable copy of your medical information. If you have questions, please visit the Frequently Asked Questions section of the [employer] website. Remember, [employer] is NOT to be used for urgent needs. For medical emergencies, dial 911. Now available from your iPhone and Android! Please provide this summary of care documentation to your next provider. Your primary care clinician is listed as Christie Watts. If you have any questions after today's visit, please call [phone].

## 2017-11-24 ENCOUNTER — HOSPITAL ENCOUNTER (OUTPATIENT)
Dept: MAMMOGRAPHY | Age: 57
Discharge: HOME | End: 2017-11-24
Attending: FAMILY MEDICINE
Payer: COMMERCIAL

## 2017-11-24 DIAGNOSIS — Z12.31 ENCOUNTER FOR SCREENING MAMMOGRAM FOR BREAST CANCER: ICD-10-CM

## 2017-11-24 PROCEDURE — 77063 BREAST TOMOSYNTHESIS BI: CPT

## 2017-12-04 ENCOUNTER — TELEPHONE (OUTPATIENT)
Dept: FAMILY MEDICINE CLINIC | Age: 57
End: 2017-12-04

## 2017-12-04 RX ORDER — METFORMIN HYDROCHLORIDE 500 MG/1
500 TABLET, FILM COATED, EXTENDED RELEASE ORAL 2 TIMES DAILY
Qty: 60 TAB | Refills: 2 | Status: SHIPPED | OUTPATIENT
Start: 2017-12-04 | End: 2018-03-18 | Stop reason: SDUPTHER

## 2017-12-04 NOTE — TELEPHONE ENCOUNTER
Please see medication refill request. Patient last seen 11/22/2017. Prescription never prescribed by Dr. Alonso Keating. Please advise.  Thank you

## 2018-01-10 DIAGNOSIS — R10.9 CHRONIC ABDOMINAL PAIN: ICD-10-CM

## 2018-01-10 DIAGNOSIS — G89.29 CHRONIC ABDOMINAL PAIN: ICD-10-CM

## 2018-01-11 RX ORDER — ONDANSETRON 4 MG/1
4 TABLET, FILM COATED ORAL
Qty: 12 TAB | Refills: 0 | Status: SHIPPED | OUTPATIENT
Start: 2018-01-11 | End: 2018-04-18

## 2018-01-19 ENCOUNTER — OFFICE VISIT (OUTPATIENT)
Dept: FAMILY MEDICINE CLINIC | Age: 58
End: 2018-01-19

## 2018-01-19 VITALS
OXYGEN SATURATION: 99 % | RESPIRATION RATE: 18 BRPM | HEART RATE: 85 BPM | HEIGHT: 60 IN | SYSTOLIC BLOOD PRESSURE: 128 MMHG | BODY MASS INDEX: 30.82 KG/M2 | WEIGHT: 157 LBS | TEMPERATURE: 98.4 F | DIASTOLIC BLOOD PRESSURE: 86 MMHG

## 2018-01-19 DIAGNOSIS — R51.9 OCCIPITAL HEADACHE: ICD-10-CM

## 2018-01-19 DIAGNOSIS — E11.65 TYPE 2 DIABETES MELLITUS WITH HYPERGLYCEMIA, WITHOUT LONG-TERM CURRENT USE OF INSULIN (HCC): ICD-10-CM

## 2018-01-19 DIAGNOSIS — Z00.00 ANNUAL PHYSICAL EXAM: Primary | ICD-10-CM

## 2018-01-19 DIAGNOSIS — G89.29 CHRONIC ABDOMINAL PAIN: ICD-10-CM

## 2018-01-19 DIAGNOSIS — G47.01 INSOMNIA SECONDARY TO CHRONIC PAIN: ICD-10-CM

## 2018-01-19 DIAGNOSIS — N89.8 VAGINAL DISCHARGE: ICD-10-CM

## 2018-01-19 DIAGNOSIS — Z11.59 NEED FOR HEPATITIS C SCREENING TEST: ICD-10-CM

## 2018-01-19 DIAGNOSIS — G89.29 INSOMNIA SECONDARY TO CHRONIC PAIN: ICD-10-CM

## 2018-01-19 DIAGNOSIS — I10 ESSENTIAL HYPERTENSION: ICD-10-CM

## 2018-01-19 DIAGNOSIS — R10.9 CHRONIC ABDOMINAL PAIN: ICD-10-CM

## 2018-01-19 DIAGNOSIS — I83.811 VARICOSE VEINS OF LEG WITH PAIN, RIGHT: ICD-10-CM

## 2018-01-19 PROBLEM — E11.21 TYPE 2 DIABETES MELLITUS WITH NEPHROPATHY (HCC): Status: ACTIVE | Noted: 2018-01-19

## 2018-01-19 RX ORDER — DOXEPIN HYDROCHLORIDE 10 MG/1
10 CAPSULE ORAL
Qty: 30 CAP | Refills: 11 | Status: SHIPPED | OUTPATIENT
Start: 2018-01-19 | End: 2018-12-26

## 2018-01-19 NOTE — MR AVS SNAPSHOT
[facility] 
 
 
 [address] [facility] [address] [facility] 83 [address] 
[phone] Patient: Katya Mercer MRN: [id number] Visit Information Date & Time Provider Department Dept. Phone Encounter #  
 2018  1:00 PM Jluianne Jackson, [address] [facility] 35 [phone] Follow-up Instructions Return in about 3 months (around 2018), or if symptoms worsen or fail to improve, for diabetes. Your Appointments 2018  3:30 PM  
Follow Up with Brown Ramirez MD  
[address] [facility]) Appt Note: Follow up/Mass (Endoscopy on 2018) [address] [facility] 405 [facility] [address]  
  
   
 [address] [facility] [address]  
  
    
  
 2018  3:30 PM  
Any with Ita Guaman MD  
[facility]) [address] [facility] [address]  
[phone]  
  
   
 [place] [id number] Upcoming Health Maintenance Date Due Hepatitis C Screening 1960 Pneumococcal 19-64 Medium Risk (1 of 1 - PPSV23) 1979 DTaP/Tdap/Td series (1 - Tdap) 1981 PAP AKA CERVICAL CYTOLOGY 1981 HEMOGLOBIN A1C Q6M 2018 FOOT EXAM Q1 3/27/2018 MICROALBUMIN Q1 3/27/2018 LIPID PANEL Q1 3/27/2018 EYE EXAM RETINAL OR DILATED Q1 2018 BREAST CANCER SCRN MAMMOGRAM 2019 COLONOSCOPY 3/3/2026 Allergies as of 2018  Review Complete On: 2018 By: Julianne Jackson MD  
  
 Severity Noted Reaction Type Reactions Macrodantin [Nitrofurantoin Macrocrystalline]  2012    Itching, Other (comments) Tape [Adhesive]  2014    Other (comments) Paper tape-- feels like burning skin Burned skin when had wound vac Current Immunizations  Reviewed on 2017 Name Date Influenza Vaccine 11/15/2016 Not reviewed this visit You Were Diagnosed With   
  
 Codes Comments Annual physical exam    -  Primary ICD-10-CM: Z00.00 ICD-9-CM: V70.0 Need for hepatitis C screening test     ICD-10-CM: Z11.59 
ICD-9-CM: V73.89 Chronic abdominal pain     ICD-10-CM: R10.9, G89.29 ICD-9-CM: 789.00, 338.29 Vaginal discharge     ICD-10-CM: N89.8 ICD-9-CM: 623.5 Essential hypertension     ICD-10-CM: I10 
ICD-9-CM: 401.9 Type 2 diabetes mellitus with hyperglycemia, without long-term current use of insulin (HCC)     ICD-10-CM: E11.65 ICD-9-CM: 250.00, 790.29 Varicose veins of leg with pain, right     ICD-10-CM: G48.809 ICD-9-CM: 454.8 Occipital headache     ICD-10-CM: R51 ICD-9-CM: 784.0 Insomnia secondary to chronic pain     ICD-10-CM: G89.29, G47.01 
ICD-9-CM: 338.29, 327.01 Vitals BP Pulse Temp Resp Height(growth percentile) Weight(growth percentile) 128/86 85 98.4 °F (36.9 °C) (Oral) 18 5' (1.524 m) 157 lb (71.2 kg) SpO2 BMI OB Status Smoking Status 99% 30.66 kg/m2 Hysterectomy Never Smoker Vitals History BMI and BSA Data Body Mass Index Body Surface Area  
 30.66 kg/m 2 1.74 m 2 Preferred Pharmacy Pharmacy Name Phone [facility] [facility] [phone] Your Updated Medication List  
  
   
This list is accurate as of: 1/19/18  1:41 PM.  Always use your most recent med list.  
  
  
  
  
 albuterol 90 mcg/actuation inhaler Commonly known as:  PROVENTIL HFA, VENTOLIN HFA, PROAIR HFA Take 1 Puff by inhalation every six (6) hours as needed for Wheezing. amLODIPine 2.5 mg tablet Commonly known as:  Rosina Peach Take 1 Tab by mouth nightly. atorvastatin 40 mg tablet Commonly known as:  LIPITOR Take 1 Tab by mouth nightly. cetirizine 10 mg tablet Commonly known as:  ZYRTEC Take 1 Tab by mouth daily. dapagliflozin 10 mg Tab tablet Commonly known as:  [employer] Take 1 Tab by mouth daily. Indications: type 2 diabetes mellitus  
  
 doxepin 10 mg capsule Commonly known as:  SINEquan Take 1 Cap by mouth nightly. glucose blood VI test strips strip Commonly known as:  ONETOUCH ULTRA TEST Check blood glucose as directed  
  
 metFORMIN 500 mg Tg24 24 hour tablet Commonly known as:  Ranell Chavarria Take 500 mg by mouth two (2) times a day. omeprazole 10 mg capsule Commonly known as:  PRILOSEC Take 1 Cap by mouth daily. ondansetron hcl 4 mg tablet Commonly known as:  ZOFRAN (AS HYDROCHLORIDE) Take 1 Tab by mouth every eight (8) hours as needed for Nausea. polyethylene glycol 17 gram/dose powder Commonly known as:  Carrielashay Arboledaa Take 17 g by mouth daily. 1 capful with 8 oz of water daily ZIAC 2.5-6.25 mg per tablet Generic drug:  bisoprolol-hydroCHLOROthiazide Take 1 Tab by mouth daily. Prescriptions Sent to Pharmacy Refills  
 doxepin (SINEQUAN) 10 mg capsule 11 Sig: Take 1 Cap by mouth nightly. Class: Normal  
 Pharmacy: [facility] [facility] Ph #: [phone] Route: Oral  
  
Follow-up Instructions Return in about 3 months (around 4/19/2018), or if symptoms worsen or fail to improve, for diabetes. To-Do List   
 01/19/2018 Lab:  HEMOGLOBIN A1C WITH EAG   
  
 01/19/2018 Lab:  HEPATITIS C AB Patient Instructions Headache: Care Instructions Your Care Instructions Headaches have many possible causes. Most headaches aren't a sign of a more serious problem, and they will get better on their own. Home treatment may help you feel better faster. The doctor has checked you carefully, but problems can develop later. If you notice any problems or new symptoms, get medical treatment right away. Follow-up care is a key part of your treatment and safety.  Be sure to make and go to all appointments, and call your doctor if you are having problems. It's also a good idea to know your test results and keep a list of the medicines you take. How can you care for yourself at home? · Do not drive if you have taken a prescription pain medicine. · Rest in a quiet, dark room until your headache is gone. Close your eyes and try to relax or go to sleep. Don't watch TV or read. · Put a cold, moist cloth or cold pack on the painful area for 10 to 20 minutes at a time. Put a thin cloth between the cold pack and your skin. · Use a warm, moist towel or a heating pad set on low to relax tight shoulder and neck muscles. · Have someone gently massage your neck and shoulders. · Take pain medicines exactly as directed. ¨ If the doctor gave you a prescription medicine for pain, take it as prescribed. ¨ If you are not taking a prescription pain medicine, ask your doctor if you can take an over-the-counter medicine. · Be careful not to take pain medicine more often than the instructions allow, because you may get worse or more frequent headaches when the medicine wears off. · Do not ignore new symptoms that occur with a headache, such as a fever, weakness or numbness, vision changes, or confusion. These may be signs of a more serious problem. To prevent headaches · Keep a headache diary so you can figure out what triggers your headaches. Avoiding triggers may help you prevent headaches. Record when each headache began, how long it lasted, and what the pain was like (throbbing, aching, stabbing, or dull). Write down any other symptoms you had with the headache, such as nausea, flashing lights or dark spots, or sensitivity to bright light or loud noise. Note if the headache occurred near your period. List anything that might have triggered the headache, such as certain foods (chocolate, cheese, wine) or odors, smoke, bright light, stress, or lack of sleep. · Find healthy ways to deal with stress. Headaches are most common during or right after stressful times. Take time to relax before and after you do something that has caused a headache in the past. 
· Try to keep your muscles relaxed by keeping good posture. Check your jaw, face, neck, and shoulder muscles for tension, and try relaxing them. When sitting at a desk, change positions often, and stretch for 30 seconds each hour. · Get plenty of sleep and exercise. · Eat regularly and well. Long periods without food can trigger a headache. · Treat yourself to a massage. Some people find that regular massages are very helpful in relieving tension. · Limit caffeine by not drinking too much coffee, tea, or soda. But don't quit caffeine suddenly, because that can also give you headaches. · Reduce eyestrain from computers by blinking frequently and looking away from the computer screen every so often. Make sure you have proper eyewear and that your monitor is set up properly, about an arm's length away. · Seek help if you have depression or anxiety. Your headaches may be linked to these conditions. Treatment can both prevent headaches and help with symptoms of anxiety or depression. When should you call for help? Call 911 anytime you think you may need emergency care. For example, call if: 
? · You have signs of a stroke. These may include: 
¨ Sudden numbness, paralysis, or weakness in your face, arm, or leg, especially on only one side of your body. ¨ Sudden vision changes. ¨ Sudden trouble speaking. ¨ Sudden confusion or trouble understanding simple statements. ¨ Sudden problems with walking or balance. ¨ A sudden, severe headache that is different from past headaches. ?Call your doctor now or seek immediate medical care if: 
? · You have a new or worse headache. ? · Your headache gets much worse. Where can you learn more? Go to http://betzy-didi.info/. Enter M271 in the search box to learn more about \"Headache: Care Instructions. \" Current as of: October 14, 2016 Content Version: 11.4 © 3115-2071 [employer]. Care instructions adapted under license by [employer] (which disclaims liability or warranty for this information). If you have questions about a medical condition or this instruction, always ask your healthcare professional. [facility] any warranty or liability for your use of this information. Well Visit, Women 48 to 72: Care Instructions Your Care Instructions Physical exams can help you stay healthy. Your doctor has checked your overall health and may have suggested ways to take good care of yourself. He or she also may have recommended tests. At home, you can help prevent illness with healthy eating, regular exercise, and other steps. Follow-up care is a key part of your treatment and safety. Be sure to make and go to all appointments, and call your doctor if you are having problems. It's also a good idea to know your test results and keep a list of the medicines you take. How can you care for yourself at home? · Reach and stay at a healthy weight. This will lower your risk for many problems, such as obesity, diabetes, heart disease, and high blood pressure. · Get at least 30 minutes of exercise on most days of the week. Walking is a good choice. You also may want to do other activities, such as running, swimming, cycling, or playing tennis or team sports. · Do not smoke. Smoking can make health problems worse. If you need help quitting, talk to your doctor about stop-smoking programs and medicines. These can increase your chances of quitting for good. · Protect your skin from too much sun. When you're outdoors from 10 a.m. to 4 p.m., stay in the shade or cover up with clothing and a hat with a wide brim. Wear sunglasses that block UV rays.  Even when it's cloudy, put broad-spectrum sunscreen (SPF 30 or higher) on any exposed skin. · See a dentist one or two times a year for checkups and to have your teeth cleaned. · Wear a seat belt in the car. · Limit alcohol to 1 drink a day. Too much alcohol can cause health problems. Follow your doctor's advice about when to have certain tests. These tests can spot problems early. · Cholesterol. Your doctor will tell you how often to have this done based on your age, family history, or other things that can increase your risk for heart attack and stroke. · Blood pressure. Have your blood pressure checked during a routine doctor visit. Your doctor will tell you how often to check your blood pressure based on your age, your blood pressure results, and other factors. · Mammogram. Ask your doctor how often you should have a mammogram, which is an X-ray of your breasts. A mammogram can spot breast cancer before it can be felt and when it is easiest to treat. · Pap test and pelvic exam. Ask your doctor how often you should have a Pap test. You may not need to have a Pap test as often as you used to. · Vision. Have your eyes checked every year or two or as often as your doctor suggests. Some experts recommend that you have yearly exams for glaucoma and other age-related eye problems starting at age 48. · Hearing. Tell your doctor if you notice any change in your hearing. You can have tests to find out how well you hear. · Diabetes. Ask your doctor whether you should have tests for diabetes. · Colon cancer. You should begin tests for colon cancer at age 48. You may have one of several tests. Your doctor will tell you how often to have tests based on your age and risk. Risks include whether you already had a precancerous polyp removed from your colon or whether your parents, sisters and brothers, or children have had colon cancer. · Thyroid disease.  Talk to your doctor about whether to have your thyroid checked as part of a regular physical exam. Women have an increased chance of a thyroid problem. · Osteoporosis. You should begin tests for bone density at age 72. If you are younger than 72, ask your doctor whether you have factors that may increase your risk for this disease. You may want to have this test before age 72. · Heart attack and stroke risk. At least every 4 to 6 years, you should have your risk for heart attack and stroke assessed. Your doctor uses factors such as your age, blood pressure, cholesterol, and whether you smoke or have diabetes to show what your risk for a heart attack or stroke is over the next 10 years. When should you call for help? Watch closely for changes in your health, and be sure to contact your doctor if you have any problems or symptoms that concern you. Where can you learn more? Go to http://betzy-didi.info/. Enter D291 in the search box to learn more about \"Well Visit, Women 50 to 72: Care Instructions. \" Current as of: May 12, 2017 Content Version: 11.4 © 5907-4715 [employer]. Care instructions adapted under license by [employer] (which disclaims liability or warranty for this information). If you have questions about a medical condition or this instruction, always ask your healthcare professional. [place] [address] any warranty or liability for your use of this information. Introducing [facility]! [facility] introduces EnerG2 patient portal. Now you can access parts of your medical record, email your doctor's office, and request medication refills online. 1. In your internet browser, go to [email or web address] 2. Click on the First Time User? Click Here link in the Sign In box. You will see the New Member Sign Up page. 3. Enter your EnerG2 Access Code exactly as it appears below. You will not need to use this code after youve completed the sign-up process.  If you do not sign up before the expiration date, you must request a new code. · Emulate Access Code: [email or web address] Expires: 2/5/2018  7:27 AM 
 
4. Enter the last four digits of your Social Security Number (xxxx) and Date of Birth (mm/dd/yyyy) as indicated and click Submit. You will be taken to the next sign-up page. 5. Create a Emulate ID. This will be your Emulate login ID and cannot be changed, so think of one that is secure and easy to remember. 6. Create a Emulate password. You can change your password at any time. 7. Enter your Password Reset Question and Answer. This can be used at a later time if you forget your password. 8. Enter your e-mail address. You will receive e-mail notification when new information is available in [address] 9. Click Sign Up. You can now view and download portions of your medical record. 10. Click the Download Summary menu link to download a portable copy of your medical information. If you have questions, please visit the Frequently Asked Questions section of the Emulate website. Remember, Emulate is NOT to be used for urgent needs. For medical emergencies, dial 911. Now available from your iPhone and Android! Please provide this summary of care documentation to your next provider. Your primary care clinician is listed as Lazara Weiss. If you have any questions after today's visit, please call [phone].

## 2018-01-19 NOTE — PATIENT INSTRUCTIONS
Headache: Care Instructions  Your Care Instructions    Headaches have many possible causes. Most headaches aren't a sign of a more serious problem, and they will get better on their own. Home treatment may help you feel better faster. The doctor has checked you carefully, but problems can develop later. If you notice any problems or new symptoms, get medical treatment right away. Follow-up care is a key part of your treatment and safety. Be sure to make and go to all appointments, and call your doctor if you are having problems. It's also a good idea to know your test results and keep a list of the medicines you take. How can you care for yourself at home? · Do not drive if you have taken a prescription pain medicine. · Rest in a quiet, dark room until your headache is gone. Close your eyes and try to relax or go to sleep. Don't watch TV or read. · Put a cold, moist cloth or cold pack on the painful area for 10 to 20 minutes at a time. Put a thin cloth between the cold pack and your skin. · Use a warm, moist towel or a heating pad set on low to relax tight shoulder and neck muscles. · Have someone gently massage your neck and shoulders. · Take pain medicines exactly as directed. ¨ If the doctor gave you a prescription medicine for pain, take it as prescribed. ¨ If you are not taking a prescription pain medicine, ask your doctor if you can take an over-the-counter medicine. · Be careful not to take pain medicine more often than the instructions allow, because you may get worse or more frequent headaches when the medicine wears off. · Do not ignore new symptoms that occur with a headache, such as a fever, weakness or numbness, vision changes, or confusion. These may be signs of a more serious problem. To prevent headaches  · Keep a headache diary so you can figure out what triggers your headaches. Avoiding triggers may help you prevent headaches.  Record when each headache began, how long it lasted, and what the pain was like (throbbing, aching, stabbing, or dull). Write down any other symptoms you had with the headache, such as nausea, flashing lights or dark spots, or sensitivity to bright light or loud noise. Note if the headache occurred near your period. List anything that might have triggered the headache, such as certain foods (chocolate, cheese, wine) or odors, smoke, bright light, stress, or lack of sleep. · Find healthy ways to deal with stress. Headaches are most common during or right after stressful times. Take time to relax before and after you do something that has caused a headache in the past.  · Try to keep your muscles relaxed by keeping good posture. Check your jaw, face, neck, and shoulder muscles for tension, and try relaxing them. When sitting at a desk, change positions often, and stretch for 30 seconds each hour. · Get plenty of sleep and exercise. · Eat regularly and well. Long periods without food can trigger a headache. · Treat yourself to a massage. Some people find that regular massages are very helpful in relieving tension. · Limit caffeine by not drinking too much coffee, tea, or soda. But don't quit caffeine suddenly, because that can also give you headaches. · Reduce eyestrain from computers by blinking frequently and looking away from the computer screen every so often. Make sure you have proper eyewear and that your monitor is set up properly, about an arm's length away. · Seek help if you have depression or anxiety. Your headaches may be linked to these conditions. Treatment can both prevent headaches and help with symptoms of anxiety or depression. When should you call for help? Call 911 anytime you think you may need emergency care. For example, call if:  ? · You have signs of a stroke. These may include:  ¨ Sudden numbness, paralysis, or weakness in your face, arm, or leg, especially on only one side of your body. ¨ Sudden vision changes.   ¨ Sudden trouble speaking. ¨ Sudden confusion or trouble understanding simple statements. ¨ Sudden problems with walking or balance. ¨ A sudden, severe headache that is different from past headaches. ?Call your doctor now or seek immediate medical care if:  ? · You have a new or worse headache. ? · Your headache gets much worse. Where can you learn more? Go to http://betzy-didi.info/. Enter M271 in the search box to learn more about \"Headache: Care Instructions. \"  Current as of: October 14, 2016  Content Version: 11.4  © 5866-7157 [employer]. Care instructions adapted under license by [employer] (which disclaims liability or warranty for this information). If you have questions about a medical condition or this instruction, always ask your healthcare professional. [place] [address] any warranty or liability for your use of this information. Well Visit, Women 48 to 72: Care Instructions  Your Care Instructions    Physical exams can help you stay healthy. Your doctor has checked your overall health and may have suggested ways to take good care of yourself. He or she also may have recommended tests. At home, you can help prevent illness with healthy eating, regular exercise, and other steps. Follow-up care is a key part of your treatment and safety. Be sure to make and go to all appointments, and call your doctor if you are having problems. It's also a good idea to know your test results and keep a list of the medicines you take. How can you care for yourself at home? · Reach and stay at a healthy weight. This will lower your risk for many problems, such as obesity, diabetes, heart disease, and high blood pressure. · Get at least 30 minutes of exercise on most days of the week. Walking is a good choice. You also may want to do other activities, such as running, swimming, cycling, or playing tennis or team sports. · Do not smoke.  Smoking can make health problems worse. If you need help quitting, talk to your doctor about stop-smoking programs and medicines. These can increase your chances of quitting for good. · Protect your skin from too much sun. When you're outdoors from 10 a.m. to 4 p.m., stay in the shade or cover up with clothing and a hat with a wide brim. Wear sunglasses that block UV rays. Even when it's cloudy, put broad-spectrum sunscreen (SPF 30 or higher) on any exposed skin. · See a dentist one or two times a year for checkups and to have your teeth cleaned. · Wear a seat belt in the car. · Limit alcohol to 1 drink a day. Too much alcohol can cause health problems. Follow your doctor's advice about when to have certain tests. These tests can spot problems early. · Cholesterol. Your doctor will tell you how often to have this done based on your age, family history, or other things that can increase your risk for heart attack and stroke. · Blood pressure. Have your blood pressure checked during a routine doctor visit. Your doctor will tell you how often to check your blood pressure based on your age, your blood pressure results, and other factors. · Mammogram. Ask your doctor how often you should have a mammogram, which is an X-ray of your breasts. A mammogram can spot breast cancer before it can be felt and when it is easiest to treat. · Pap test and pelvic exam. Ask your doctor how often you should have a Pap test. You may not need to have a Pap test as often as you used to. · Vision. Have your eyes checked every year or two or as often as your doctor suggests. Some experts recommend that you have yearly exams for glaucoma and other age-related eye problems starting at age 48. · Hearing. Tell your doctor if you notice any change in your hearing. You can have tests to find out how well you hear. · Diabetes. Ask your doctor whether you should have tests for diabetes. · Colon cancer. You should begin tests for colon cancer at age 48. You may have one of several tests. Your doctor will tell you how often to have tests based on your age and risk. Risks include whether you already had a precancerous polyp removed from your colon or whether your parents, sisters and brothers, or children have had colon cancer. · Thyroid disease. Talk to your doctor about whether to have your thyroid checked as part of a regular physical exam. Women have an increased chance of a thyroid problem. · Osteoporosis. You should begin tests for bone density at age 72. If you are younger than 72, ask your doctor whether you have factors that may increase your risk for this disease. You may want to have this test before age 72. · Heart attack and stroke risk. At least every 4 to 6 years, you should have your risk for heart attack and stroke assessed. Your doctor uses factors such as your age, blood pressure, cholesterol, and whether you smoke or have diabetes to show what your risk for a heart attack or stroke is over the next 10 years. When should you call for help? Watch closely for changes in your health, and be sure to contact your doctor if you have any problems or symptoms that concern you. Where can you learn more? Go to http://betzy-didi.info/. Enter B011 in the search box to learn more about \"Well Visit, Women 50 to 72: Care Instructions. \"  Current as of: May 12, 2017  Content Version: 11.4  © 6372-9017 [employer]. Care instructions adapted under license by [employer] (which disclaims liability or warranty for this information). If you have questions about a medical condition or this instruction, always ask your healthcare professional. [place] [address] any warranty or liability for your use of this information.

## 2018-01-19 NOTE — PROGRESS NOTES
Victor Hugo Vines is a 62 y.o.  female and presents with    Chief Complaint   Patient presents with    Physical     Subjective: Well Adult Physical   Patient here for a comprehensive physical exam.The patient reports problems - abdominal pain with procedure scheduled with GI to undergo more extensive colonoscopy; She will f/u with dr. Jah Dalton after colonoscopy to discuss surgery. She denies hematochezia or melena. Dr. Luca Francois restarted bentyl for patient and she has been using this for 3 days. Do you take any herbs or supplements that were not prescribed by a doctor? no Are you taking calcium supplements? no Are you taking aspirin daily? no    She has chronic headache with most recent episode lasting one month. She has increased pain with bending forward. She has new glasses. She denies scotomata. She denies nausea associated with headache. She has used nsaid without improvement; her  massaged her neck which made the headache worse. She c/o knots in legs. She has had symptoms for several months. She denies pain or rashes. She c/o vaginal discharge which has been present every other month. She had hysterectomy 32 years ago. She is occasionally sexually active. Discharge is beige without an odor; discharge occurred after intercourse. It has resolved today.       Patient Active Problem List   Diagnosis Code    Osteoarthritis of left knee M17.12    Hypertension I10    Hyperlipidemia E78.5    GERD (gastroesophageal reflux disease) K21.9    Asthma J45.909    Type 2 diabetes mellitus ([facility] [address] E11.9    SBO (small bowel obstruction) K56.609    Chronic abdominal pain R10.9, G89.29    Post-operative pain G89.18    Chest pain R07.9    Essential hypertension, benign I10    Sural neuritis G57.80    Type 2 diabetes mellitus with nephropathy (HCC) E11.21     Patient Active Problem List    Diagnosis Date Noted    Type 2 diabetes mellitus with nephropathy ([facility] [address] 01/19/2018  Chronic abdominal pain 03/14/2017    Post-operative pain 03/14/2017    SBO (small bowel obstruction) 02/21/2017    Osteoarthritis of left knee 06/27/2016    Hypertension 06/27/2016    Hyperlipidemia 06/27/2016    GERD (gastroesophageal reflux disease) 06/27/2016    Asthma 06/27/2016    Type 2 diabetes mellitus ([facility] [address] 06/27/2016    Sural neuritis 06/23/2014    Chest pain 04/20/2014    Essential hypertension, benign 02/27/2012     Current Outpatient Prescriptions   Medication Sig Dispense Refill    ondansetron hcl (ZOFRAN, AS HYDROCHLORIDE,) 4 mg tablet Take 1 Tab by mouth every eight (8) hours as needed for Nausea. 12 Tab 0    metFORMIN (GLUMETZA) 500 mg TG24 24 hour tablet Take 500 mg by mouth two (2) times a day. 60 Tab 2    dapagliflozin (FARXIGA) 10 mg tab tablet Take 1 Tab by mouth daily. Indications: type 2 diabetes mellitus 90 Tab 3    omeprazole (PRILOSEC) 10 mg capsule Take 1 Cap by mouth daily. 30 Cap 11    cetirizine (ZYRTEC) 10 mg tablet Take 1 Tab by mouth daily. 30 Tab 1    albuterol (PROVENTIL HFA, VENTOLIN HFA, PROAIR HFA) 90 mcg/actuation inhaler Take 1 Puff by inhalation every six (6) hours as needed for Wheezing. 1 Inhaler 0    polyethylene glycol (MIRALAX) 17 gram/dose powder Take 17 g by mouth daily. 1 capful with 8 oz of water daily 119 g 0    atorvastatin (LIPITOR) 40 mg tablet Take 1 Tab by mouth nightly. 90 Tab 3    amLODIPine (NORVASC) 2.5 mg tablet Take 1 Tab by mouth nightly. 90 Tab 1    glucose blood VI test strips (ONETOUCH ULTRA TEST) strip Check blood glucose as directed 100 Strip 1    bisoprolol-hydrochlorothiazide (ZIAC) 2.5-6.25 mg per tablet Take 1 Tab by mouth daily.        Allergies   Allergen Reactions    Macrodantin [Nitrofurantoin Macrocrystalline] Itching and Other (comments)    Tape [Adhesive] Other (comments)     Paper tape-- feels like burning skin  Burned skin when had wound vac     Past Medical History:   Diagnosis Date    Abdominal adhesions  Anemia     Arthritis     all over the body    Asthma     Cocaine abuse     Diabetes mellitus     Dyskinesia     bilateral    Essential hypertension     GERD (gastroesophageal reflux disease)     Hypertension     Menopause     Microhematuria     Stool color black      Past Surgical History:   Procedure Laterality Date    HX CHOLECYSTECTOMY  6/28/10    HX COLONOSCOPY      HX ENDOSCOPY      HX HERNIA REPAIR      HX HYSTERECTOMY      Fibroids    HX KNEE REPLACEMENT      HX OOPHORECTOMY  12/2000    HX SMALL BOWEL RESECTION  12/2000    HX SMALL BOWEL RESECTION  02/21/2017    Dr. Brenna Story       Family History   Problem Relation Age of Onset    Hypertension Other      parent    Breast Cancer Other 21    Heart Disease Father     Hypertension Father     Diabetes Father     Diabetes Mother     Hypertension Other      sibling    Diabetes Other      parent    Diabetes Sister     Kidney Disease Brother     Diabetes Brother      Social History   Substance Use Topics    Smoking status: Never Smoker    Smokeless tobacco: Never Used    Alcohol use No       ROS   General ROS: positive for  - night sweats  negative for - chills, fatigue or fever  Psychological ROS: positive for - depression  Ophthalmic ROS: positive for - uses glasses  ENT ROS: negative for - nasal congestion or sore throat  Endocrine ROS: negative for - polydipsia/polyuria or temperature intolerance  Respiratory ROS: no cough, shortness of breath, or wheezing  Cardiovascular ROS: no chest pain or dyspnea on exertion  Genito-Urinary ROS: no dysuria, trouble voiding, or hematuria  Neurological ROS: negative for - numbness/tingling or weakness  Dermatological ROS: negative for - rash or skin lesion changes    All other systems reviewed and are negative.       Objective:  Vitals:    01/19/18 1312   BP: 128/86   Pulse: 85   Resp: 18   Temp: 98.4 °F (36.9 °C)   TempSrc: Oral   SpO2: 99%   Weight: 157 lb (71.2 kg)   Height: 5' (1.524 m)   PainSc:   8   PainLoc: Abdomen       General appearance  alert, cooperative, no distress, appears stated age   Head  Normocephalic, without obvious abnormality, atraumatic   Eyes  conjunctivae/corneas clear. PERRL, EOM's intact. Ears  normal TM's and external ear canals AU   Nose Nares normal. Septum midline. Mucosa normal. No drainage or sinus tenderness. Throat Lips, mucosa, and tongue normal. Teeth and gums normal   Neck supple, symmetrical, trachea midline, no adenopathy, thyroid: not enlarged, symmetric, no tenderness/mass/nodules   Back   symmetric, no curvature. ROM normal. No CVA tenderness   Lungs   clear to auscultation bilaterally   Breasts  no masses, tenderness   Heart  regular rate and rhythm, S1, S2 normal, no murmur, click, rub or gallop   Abdomen   Soft with tenderness. Bowel sounds normal. No masses,  No organomegaly   Pelvic Deferred   Extremities extremities normal, atraumatic, no cyanosis or edema   Pulses 2+ and symmetric   Skin Skin color, texture, turgor normal. No rashes or lesions   Lymph nodes Cervical, supraclavicular, and axillary nodes normal.   Neurologic Normal     Assessment/Plan:    1. Annual physical exam  Reviewed preventive recommendations    2. Need for hepatitis C screening test    - HEPATITIS C AB; Future    3. Chronic abdominal pain  F/u with surgery    4. Vaginal discharge  Improved; no blood    5. Essential hypertension  Goal <130/80; well controlled; encourage exercise as tolerated    6. Type 2 diabetes mellitus with hyperglycemia, without long-term current use of insulin (HCC)  Goal hgb a1c <7; well controlled with current treatment  - HEMOGLOBIN A1C WITH EAG; Future    7. Varicose veins of leg with pain, right  Compression stockings recommended    8. Occipital headache  Cervical stretching and exercises demonstrated    9. Insomnia secondary to chronic pain  Continue effective treatment   - doxepin (SINEQUAN) 10 mg capsule;  Take 1 Cap by mouth nightly. Dispense: 30 Cap; Refill: 11      Lab review: orders written for new lab studies as appropriate; see orders      I have discussed the diagnosis with the patient and the intended plan as seen in the above orders. The patient has received an after-visit summary and questions were answered concerning future plans. I have discussed medication side effects and warnings with the patient as well. I have reviewed the plan of care with the patient, accepted their input and they are in agreement with the treatment goals. Follow-up Disposition:  Return in about 3 months (around 4/19/2018), or if symptoms worsen or fail to improve, for diabetes.

## 2018-01-19 NOTE — PROGRESS NOTES
Tom Slater is a 62 y.o. female     Chief Complaint   Patient presents with    Physical         1. Have you been to the ER, urgent care clinic since your last visit? Hospitalized since your last visit? No    2. Have you seen or consulted any other health care providers outside of the [address] [place] [address] since your last visit? Include any pap smears or colon screening.  No  .

## 2018-01-20 LAB — HCV AB SER IA-ACNC: NORMAL

## 2018-01-21 LAB
AVG GLU, 10930: 140 MG/DL (ref 91–123)
HBA1C MFR BLD HPLC: 6.5 % (ref 4.8–5.9)

## 2018-01-22 RX ORDER — TRAMADOL HYDROCHLORIDE 50 MG/1
TABLET ORAL
Refills: 0 | COMMUNITY
Start: 2017-12-27 | End: 2018-02-21 | Stop reason: ALTCHOICE

## 2018-01-22 RX ORDER — CETIRIZINE HYDROCHLORIDE 10 MG/1
TABLET, FILM COATED ORAL
Refills: 0 | COMMUNITY
Start: 2017-12-06 | End: 2019-05-28 | Stop reason: SDUPTHER

## 2018-01-23 ENCOUNTER — ANESTHESIA EVENT (OUTPATIENT)
Dept: ENDOSCOPY | Age: 58
End: 2018-01-23
Payer: COMMERCIAL

## 2018-01-24 ENCOUNTER — HOSPITAL ENCOUNTER (OUTPATIENT)
Age: 58
Setting detail: OUTPATIENT SURGERY
Discharge: HOME | End: 2018-01-24
Attending: INTERNAL MEDICINE | Admitting: INTERNAL MEDICINE
Payer: COMMERCIAL

## 2018-01-24 ENCOUNTER — ANESTHESIA (OUTPATIENT)
Dept: ENDOSCOPY | Age: 58
End: 2018-01-24
Payer: COMMERCIAL

## 2018-01-24 VITALS
TEMPERATURE: 98 F | HEART RATE: 105 BPM | WEIGHT: 157 LBS | SYSTOLIC BLOOD PRESSURE: 118 MMHG | OXYGEN SATURATION: 98 % | DIASTOLIC BLOOD PRESSURE: 80 MMHG | HEIGHT: 64 IN | BODY MASS INDEX: 26.8 KG/M2 | RESPIRATION RATE: 16 BRPM

## 2018-01-24 PROBLEM — R11.15 NON-INTRACTABLE CYCLICAL VOMITING WITH NAUSEA: Status: ACTIVE | Noted: 2018-01-24

## 2018-01-24 LAB — GLUCOSE BLD STRIP.AUTO-MCNC: 107 MG/DL (ref 70–110)

## 2018-01-24 PROCEDURE — 82962 GLUCOSE BLOOD TEST: CPT

## 2018-01-24 PROCEDURE — 74011250636 HC RX REV CODE- 250/636: Performed by: NURSE ANESTHETIST, CERTIFIED REGISTERED

## 2018-01-24 PROCEDURE — 77030009426 HC FCPS BIOP ENDOSC BSC -B: Performed by: INTERNAL MEDICINE

## 2018-01-24 PROCEDURE — 74011000250 HC RX REV CODE- 250

## 2018-01-24 PROCEDURE — 76040000019: Performed by: INTERNAL MEDICINE

## 2018-01-24 PROCEDURE — 88342 IMHCHEM/IMCYTCHM 1ST ANTB: CPT | Performed by: INTERNAL MEDICINE

## 2018-01-24 PROCEDURE — 88305 TISSUE EXAM BY PATHOLOGIST: CPT | Performed by: INTERNAL MEDICINE

## 2018-01-24 PROCEDURE — 76060000031 HC ANESTHESIA FIRST 0.5 HR: Performed by: INTERNAL MEDICINE

## 2018-01-24 PROCEDURE — 74011250636 HC RX REV CODE- 250/636

## 2018-01-24 RX ORDER — SODIUM CHLORIDE 9 MG/ML
25 INJECTION, SOLUTION INTRAVENOUS CONTINUOUS
Status: DISCONTINUED | OUTPATIENT
Start: 2018-01-24 | End: 2018-01-24 | Stop reason: HOSPADM

## 2018-01-24 RX ORDER — INSULIN LISPRO 100 [IU]/ML
INJECTION, SOLUTION INTRAVENOUS; SUBCUTANEOUS ONCE
Status: DISCONTINUED | OUTPATIENT
Start: 2018-01-24 | End: 2018-01-24 | Stop reason: HOSPADM

## 2018-01-24 RX ORDER — SODIUM CHLORIDE 0.9 % (FLUSH) 0.9 %
5-10 SYRINGE (ML) INJECTION EVERY 8 HOURS
Status: DISCONTINUED | OUTPATIENT
Start: 2018-01-24 | End: 2018-01-24 | Stop reason: HOSPADM

## 2018-01-24 RX ORDER — PROPOFOL 10 MG/ML
INJECTION, EMULSION INTRAVENOUS
Status: DISCONTINUED | OUTPATIENT
Start: 2018-01-24 | End: 2018-01-24 | Stop reason: HOSPADM

## 2018-01-24 RX ORDER — GLYCOPYRROLATE 0.2 MG/ML
INJECTION INTRAMUSCULAR; INTRAVENOUS AS NEEDED
Status: DISCONTINUED | OUTPATIENT
Start: 2018-01-24 | End: 2018-01-24 | Stop reason: HOSPADM

## 2018-01-24 RX ORDER — SODIUM CHLORIDE, SODIUM LACTATE, POTASSIUM CHLORIDE, CALCIUM CHLORIDE 600; 310; 30; 20 MG/100ML; MG/100ML; MG/100ML; MG/100ML
75 INJECTION, SOLUTION INTRAVENOUS CONTINUOUS
Status: DISCONTINUED | OUTPATIENT
Start: 2018-01-25 | End: 2018-01-24 | Stop reason: HOSPADM

## 2018-01-24 RX ORDER — ALBUTEROL SULFATE 0.83 MG/ML
2.5 SOLUTION RESPIRATORY (INHALATION)
Status: CANCELLED | OUTPATIENT
Start: 2018-01-24

## 2018-01-24 RX ORDER — LIDOCAINE HYDROCHLORIDE 20 MG/ML
INJECTION, SOLUTION EPIDURAL; INFILTRATION; INTRACAUDAL; PERINEURAL AS NEEDED
Status: DISCONTINUED | OUTPATIENT
Start: 2018-01-24 | End: 2018-01-24 | Stop reason: HOSPADM

## 2018-01-24 RX ORDER — PROPOFOL 10 MG/ML
INJECTION, EMULSION INTRAVENOUS AS NEEDED
Status: DISCONTINUED | OUTPATIENT
Start: 2018-01-24 | End: 2018-01-24 | Stop reason: HOSPADM

## 2018-01-24 RX ORDER — SODIUM CHLORIDE 0.9 % (FLUSH) 0.9 %
5-10 SYRINGE (ML) INJECTION AS NEEDED
Status: DISCONTINUED | OUTPATIENT
Start: 2018-01-24 | End: 2018-01-24 | Stop reason: HOSPADM

## 2018-01-24 RX ADMIN — PROPOFOL 100 MCG/KG/MIN: 10 INJECTION, EMULSION INTRAVENOUS at 09:59

## 2018-01-24 RX ADMIN — PROPOFOL 80 MG: 10 INJECTION, EMULSION INTRAVENOUS at 09:59

## 2018-01-24 RX ADMIN — LIDOCAINE HYDROCHLORIDE 40 MG: 20 INJECTION, SOLUTION EPIDURAL; INFILTRATION; INTRACAUDAL; PERINEURAL at 09:59

## 2018-01-24 RX ADMIN — GLYCOPYRROLATE 0.3 MG: 0.2 INJECTION INTRAMUSCULAR; INTRAVENOUS at 09:57

## 2018-01-24 RX ADMIN — SODIUM CHLORIDE, SODIUM LACTATE, POTASSIUM CHLORIDE, AND CALCIUM CHLORIDE 75 ML/HR: 600; 310; 30; 20 INJECTION, SOLUTION INTRAVENOUS at 09:24

## 2018-01-24 NOTE — IP AVS SNAPSHOT
[id number] 
[phone] Patient: Radha Castillo MRN: [id number] About your hospitalization You were admitted on:  January 24, 2018 You last received care in the:  [facility] PHASE 2 RECOVERY You were discharged on:  January 24, 2018 Why you were hospitalized Your primary diagnosis was:  Non-Intractable Cyclical Vomiting With Nausea Follow-up Information Follow up With Details Comments Contact Info Jim Garvin MD In 4 weeks  [address] [place] [address] [id number] 
[phone] Lucille Chapin MD   [address] [facility] [address] [address] [address] [place] [address] [address] 
[phone] Your Scheduled Appointments Wednesday January 31, 2018  3:30 PM EST Follow Up with Yue Araujo MD  
[address] [facility] [address] [facility] [id number] [phone] Discharge Orders None A check nadia indicates which time of day the medication should be taken. My Medications CONTINUE taking these medications Instructions Each Dose to Equal  
 Morning Noon Evening Bedtime  
 albuterol 90 mcg/actuation inhaler Commonly known as:  PROVENTIL HFA, VENTOLIN HFA, PROAIR HFA Your last dose was: Your next dose is: Take 1 Puff by inhalation every six (6) hours as needed for Wheezing. 1 Puff ALLERGY RELIEF (CETIRIZINE) 10 mg tablet Generic drug:  cetirizine Your last dose was: Your next dose is:    
   
   
 take 1 tablet by mouth once daily  
     
   
   
   
  
 amLODIPine 2.5 mg tablet Commonly known as:  Jane Prow Your last dose was: Your next dose is: Take 1 Tab by mouth nightly. 2.5 mg  
    
   
   
   
  
 atorvastatin 40 mg tablet Commonly known as:  LIPITOR Your last dose was: Your next dose is: Take 1 Tab by mouth nightly. 40 mg  
    
   
   
   
  
 BENTYL 10 mg capsule Generic drug:  dicyclomine Your last dose was: Your next dose is: Take 10 mg by mouth 4 times daily (before meals and nightly). 10 mg  
    
   
   
   
  
 dapagliflozin 10 mg Tab tablet Commonly known as:  [employer] Your last dose was: Your next dose is: Take 1 Tab by mouth daily. Indications: type 2 diabetes mellitus 10 mg  
    
   
   
   
  
 doxepin 10 mg capsule Commonly known as:  SINEquan Your last dose was: Your next dose is: Take 1 Cap by mouth nightly. 10 mg  
    
   
   
   
  
 glucose blood VI test strips strip Commonly known as:  ONETOUCH ULTRA TEST Your last dose was: Your next dose is:    
   
   
 Check blood glucose as directed  
     
   
   
   
  
 metFORMIN 500 mg Tg24 24 hour tablet Commonly known as:  Laya Pion Your last dose was: Your next dose is: Take 500 mg by mouth two (2) times a day. 500 mg  
    
   
   
   
  
 omeprazole 10 mg capsule Commonly known as:  PRILOSEC Your last dose was: Your next dose is: Take 1 Cap by mouth daily. 10 mg  
    
   
   
   
  
 ondansetron hcl 4 mg tablet Commonly known as:  ZOFRAN (AS HYDROCHLORIDE) Your last dose was: Your next dose is: Take 1 Tab by mouth every eight (8) hours as needed for Nausea. 4 mg  
    
   
   
   
  
 polyethylene glycol 17 gram/dose powder Commonly known as:  Venetta Linear Your last dose was: Your next dose is: Take 17 g by mouth daily. 1 capful with 8 oz of water daily 17 g  
    
   
   
   
  
 traMADol 50 mg tablet Commonly known as:  ULTRAM  
   
Your last dose was: Your next dose is: take 1-2 tablets by mouth every 6 hours if needed for pain ZIAC 2.5-6.25 mg per tablet Generic drug:  bisoprolol-hydroCHLOROthiazide Your last dose was: Your next dose is: Take 1 Tab by mouth daily. 1 Tab Discharge Instructions EGD DISCHARGE INSTRUCTIONS You have just had an examination of your esophagus (swallowing tube), stomach and duodenum (the first part of your small intestine) and of your colon (large intestine). The medication given to you during your procedure will be acting in your body for the next 12 hours, gradually wearing off. Your face may be flushed, skin may be warm and sweaty, you might feel a little bloated or nauseated and sleepy. 1. For the next 12 hours you SHOULD NOT: 
 
a. Drive, operate any machinery. b. Drink alcohol. 
c. Engage in activities that require mental sharpness or manual dexterity such as cooking. d. Take any medications other than prescribed by a physician. 
e. Make any legal or financial decisions. 2. Call this office immediately, if: 
 
a. Onset of new or increase of felicia rectal bleeding. 
b. Fever > 101 
c. Increased abdominal pain Additional instructions: 
 
a. Diet as recommended 
b. Due to risk of dehydration after colon prep and sedation, avoid extended periods of time outdoors if the day is hot and humid. c. If biopsies were taken, you will receive a post card or telephone call with results of your biopsies and suggestion for your next colonoscopy. Please allow us at least 3 weeks to get back with you about your results. If we have not contacted you by the, please call us for results. Ph# [phone] 
d. If you had polyp(s) removed DO NOT TAKE NSAIDS (Aspirin, Advil, Aleve, Naproxyn, Ibuprofen, etc) for 2 weeks. Tylenol is OK to use. Upper GI Endoscopy: What to Expect at [facility] Your Recovery After you have an endoscopy, you will stay at the hospital or clinic for 1 to 2 hours. This will allow the medicine to wear off. You will be able to go home after your doctor or nurse checks to make sure you are not having any problems. You may have to stay overnight if you had treatment during the test. You may have a sore throat for a day or two after the test. 
This care sheet gives you a general idea about what to expect after the test. 
How can you care for yourself at home? Activity · Rest as much as you need to after you go home. · You should be able to go back to your usual activities the day after the test. 
Diet · Follow your doctor's directions for eating after the test. 
· Drink plenty of fluids (unless your doctor has told you not to). Medications · If you have a sore throat the day after the test, use an over-the-counter spray to numb your throat. Follow-up care is a key part of your treatment and safety. Be sure to make and go to all appointments, and call your doctor if you are having problems. It's also a good idea to know your test results and keep a list of the medicines you take. When should you call for help? Call 911 anytime you think you may need emergency care. For example, call if: 
? · You passed out (loses consciousness). ? · You have trouble breathing. ? · You pass maroon or bloody stools. ?Call your doctor now or seek immediate medical care if: 
? · You have pain that does not get better after your take pain medicine. ? · You have new or worse belly pain. ? · You have blood in your stools. ? · You are sick to your stomach and cannot keep fluids down. ? · You have a fever. ? · You cannot pass stools or gas. ? Watch closely for changes in your health, and be sure to contact your doctor if: 
? · Your throat still hurts after a day or two. ? · You do not get better as expected. Where can you learn more? Go to http://betzy-didi.info/. Enter [phone] [phone] in the search box to learn more about \"Upper GI Endoscopy: What to Expect at Home. \" Current as of: May 12, 2017 Content Version: 11.4 © 4955-5399 American Board of Addiction Medicine (ABAM). Care instructions adapted under license by [employer] (which disclaims liability or warranty for this information). If you have questions about a medical condition or this instruction, always ask your healthcare professional. [place] [address] any warranty or liability for your use of this information. Patient armband removed and given to patient to take home. Patient was informed of the privacy risks if armband lost or stolen Introducing \[facility] [place] [facility]\"" & HEALTH SERVICES! Shandra Pearson introduces [employer] patient portal. Now you can access parts of your medical record, email your doctor's office, and request medication refills online. 1. In your internet browser, go to [email or web address] 2. Click on the First Time User? Click Here link in the Sign In box. You will see the New Member Sign Up page. 3. Enter your [employer] Access Code exactly as it appears below. You will not need to use this code after youve completed the sign-up process. If you do not sign up before the expiration date, you must request a new code. · [employer] Access Code: [email or web address] Expires: 2/5/2018  7:27 AM 
 
4. Enter the last four digits of your Social Security Number (xxxx) and Date of Birth (mm/dd/yyyy) as indicated and click Submit. You will be taken to the next sign-up page. 5. Create a [employer] ID. This will be your [employer]elli[employer] login ID and cannot be changed, so think of one that is secure and easy to remember. 6. Create a [employer] password. You can change your password at any time. 7. Enter your Password Reset Question and Answer. This can be used at a later time if you forget your password. 8. Enter your e-mail address.  You will receive e-mail notification when new information is available in [employer]. 9. Click Sign Up. You can now view and download portions of your medical record. 10. Click the Download Summary menu link to download a portable copy of your medical information. If you have questions, please visit the Frequently Asked Questions section of the [employer] website. Remember, [employer] is NOT to be used for urgent needs. For medical emergencies, dial 911. Now available from your iPhone and Android! Providers Seen During Your Hospitalization Provider Specialty Primary office phone Luna Mai MD Gastroenterology [phone] Your Primary Care Physician (PCP) Primary Care Physician Office Phone Office Fax [address] You are allergic to the following Allergen Reactions Macrodantin (Nitrofurantoin Macrocrystalline) Itching Other (comments) Tape (Adhesive) Other (comments) Paper tape-- feels like burning skin Burned skin when had wound vac Recent Documentation Height Weight Breastfeeding? BMI OB Status Smoking Status 1.626 m 71.2 kg No 26.95 kg/m2 Hysterectomy Never Smoker Emergency Contacts Name Discharge Info Relation Home Work Mobile [facility] DISCHARGE CAREGIVER [3] Spouse [3] [phone] Lady Fair CAREGIVER [3] Mother [14]   [phone] Patient Belongings The following personal items are in your possession at time of discharge: 
  Dental Appliances: None  Visual Aid: With patient, Glasses Please provide this summary of care documentation to your next provider. Signatures-by signing, you are acknowledging that this After Visit Summary has been reviewed with you and you have received a copy. Patient Signature:  ____________________________________________________________ Date:  ____________________________________________________________  
  
[place] Current Provider Signature:  ____________________________________________________________ Date:  ____________________________________________________________

## 2018-01-24 NOTE — DISCHARGE INSTRUCTIONS
EGD DISCHARGE INSTRUCTIONS    You have just had an examination of your esophagus (swallowing tube), stomach and duodenum (the first part of your small intestine) and of your colon (large intestine). The medication given to you during your procedure will be acting in your body for the next 12 hours, gradually wearing off. Your face may be flushed, skin may be warm and sweaty, you might feel a little bloated or nauseated and sleepy. 1. For the next 12 hours you SHOULD NOT:    a. Drive, operate any machinery. b. Drink alcohol.  c. Engage in activities that require mental sharpness or manual dexterity such as cooking. d. Take any medications other than prescribed by a physician.  e. Make any legal or financial decisions. 2. Call this office immediately, if:    a. Onset of new or increase of felicia rectal bleeding.  b. Fever > 101  c. Increased abdominal pain    Additional instructions:    a. Diet as recommended  b. Due to risk of dehydration after colon prep and sedation, avoid extended periods of time outdoors if the day is hot and humid. c. If biopsies were taken, you will receive a post card or telephone call with results of your biopsies and suggestion for your next colonoscopy. Please allow us at least 3 weeks to get back with you about your results. If we have not contacted you by the, please call us for results. # [phone]  d. If you had polyp(s) removed DO NOT TAKE NSAIDS (Aspirin, Advil, Aleve, Naproxyn, Ibuprofen, etc) for 2 weeks. Tylenol is OK to use. Upper GI Endoscopy: What to Expect at [address] [place] [address]  After you have an endoscopy, you will stay at the hospital or clinic for 1 to 2 hours. This will allow the medicine to wear off. You will be able to go home after your doctor or nurse checks to make sure you are not having any problems.   You may have to stay overnight if you had treatment during the test. You may have a sore throat for a day or two after the test.  This care sheet gives you a general idea about what to expect after the test.  How can you care for yourself at home? Activity  · Rest as much as you need to after you go home. · You should be able to go back to your usual activities the day after the test.  Diet  · Follow your doctor's directions for eating after the test.  · Drink plenty of fluids (unless your doctor has told you not to). Medications  · If you have a sore throat the day after the test, use an over-the-counter spray to numb your throat. Follow-up care is a key part of your treatment and safety. Be sure to make and go to all appointments, and call your doctor if you are having problems. It's also a good idea to know your test results and keep a list of the medicines you take. When should you call for help? Call 911 anytime you think you may need emergency care. For example, call if:  ? · You passed out (loses consciousness). ? · You have trouble breathing. ? · You pass maroon or bloody stools. ?Call your doctor now or seek immediate medical care if:  ? · You have pain that does not get better after your take pain medicine. ? · You have new or worse belly pain. ? · You have blood in your stools. ? · You are sick to your stomach and cannot keep fluids down. ? · You have a fever. ? · You cannot pass stools or gas. ? Watch closely for changes in your health, and be sure to contact your doctor if:  ? · Your throat still hurts after a day or two. ? · You do not get better as expected. Where can you learn more? Go to http://betzy-didi.info/. Enter ([phone] in the search box to learn more about \"Upper GI Endoscopy: What to Expect at Home. \"  Current as of: May 12, 2017  Content Version: 11.4  © 5600-2160 [employer]. Care instructions adapted under license by [employer] (which disclaims liability or warranty for this information).  If you have questions about a medical condition or this instruction, always ask your healthcare professional. [place] [address] any warranty or liability for your use of this information. Patient armband removed and given to patient to take home.   Patient was informed of the privacy risks if armband lost or stolen

## 2018-01-24 NOTE — PROCEDURES
EGD Procedure Note    Patient: Nikita Quach MRN: [id number]  SSN: [id number]    YOB: 1960  Age: 62 y.o. Sex: female      Date of Procedure: 1/24/2018      Procedures:  EGD with small bowel enteroscopy     HISTORY UPDATE: History and physical has been reviewed. The patient has been examined. There have been no significant clinical changes since the completion of the originally dated History and Physical.    INDICATION: recurrent abdominal pain, nausea, vomiting. CT in 12/2017 showed jejunitis     PROCEDURE PERFORMED: EGD    ENDOSCOPIST: De Galindo MD    ASSISTANT:  Endoscopy Technician-1: Beronica Vale  Endoscopy RN-1: Dez Mckenzie RN    CLASSIFICATION OF PREOPERATIVE RISK: ASA 2 - Patient with mild systemic disease with no functional limitations    ANESTHESIA:  MAC anesthesia    ENDOSCOPE: [id number]                                                                                                              EXTENT OF EXAM: Second portion of the duodenum      DESCRIPTION OF PROCEDURE:   The procedure was discussed with the patient including purpose, risks, benefits and alternatives including but not limited to IV conscious sedation, bleeding, perforation and aspiration and the consent form was signed and witnessed. A safety timeout was performed. Procedure done with MAC. The patients vital signs were monitored at all times including heart rate and rhythm, oxygen saturation, and blood pressure. The patient was then placed into the left lateral decubitus position. The Olympus adult diagnostic endoscope was then passed under direct visualization to the second portion of the duodenum. The endoscope was then slowly withdrawn while closely visualizing the mucosa. In the stomach a retroflexion was performed and gastric fundus and cardia visualized. The scope was then removed. The patient was then transferred to the recovery room. FINDINGS:   Esophagus: The esophageal mucosa was normal with no ulceration, mass or stricture. There was no evidence of Villagomez's esophagus or reflux esophagitis. Z line at 35 cm. Stomach: The gastric mucosa showed  no ulceration, mass, stricture. Biopsies done from antrum to r/o H. PyloriRetroflexion revealed a normal cardia and fundus      Duodenum and jejunum : The duodenum and jejunal  mucosa was normal with no ulceration, mass, stricture and no evidence of villous atrophy. Scope advanced to 80 cm. EBL: 0 ml      SPECIMENS:   ID Type Source Tests Collected by Time Destination   1 : bx r/o H. Pylori  Preservative Gastric  Dagoberto Spatz Mendu, MD 1/24/2018 1010 Pathology       IMPRESSION: Normal EGD with small bowel enteroscopy to 80 cm. Gastric biopsies done to r/o H. Pylori. PLAN 1: Discharge when sedation criteria are met. 2.  Resume regular Diet as tolerated.  3. Follow up on the biopsy results      Follow Up:  As scheduled      Aviva Mireles MD  1/24/2018

## 2018-01-24 NOTE — ANESTHESIA PREPROCEDURE EVALUATION
Anesthetic History               Review of Systems / Medical History  Patient summary reviewed and pertinent labs reviewed    Pulmonary            Asthma : well controlled       Neuro/Psych              Cardiovascular    Hypertension: well controlled              Exercise tolerance: >4 METS     GI/Hepatic/Renal     GERD: well controlled           Endo/Other    Diabetes: well controlled, type 2    Arthritis     Other Findings   Comments: Documentation of current medication  Current medications obtained, documented and obtained? YES      Risk Factors for Postoperative nausea/vomiting:       History of postoperative nausea/vomiting? NO       Female? YES       Motion sickness? NO       Intended opioid administration for postoperative analgesia? YES      Smoking Abstinence:  Current Smoker? NO  Elective Surgery? YES  Seen preoperatively by anesthesiologist or proxy prior to day of surgery? YES  Pt abstained from smoking 24 hours prior to anesthesia?  N/A    Preventive care/screening for High Blood Pressure:  Aged 18 years and older: YES  Screened for high blood pressure: YES  Patients with high blood pressure referred to primary care provider   for BP management: YES                 Physical Exam    Airway  Mallampati: II  TM Distance: 4 - 6 cm  Neck ROM: normal range of motion   Mouth opening: Normal     Cardiovascular    Rhythm: regular  Rate: normal         Dental  No notable dental hx       Pulmonary  Breath sounds clear to auscultation               Abdominal  GI exam deferred       Other Findings            Anesthetic Plan    ASA: 3  Anesthesia type: MAC            Anesthetic plan and risks discussed with: Patient

## 2018-01-24 NOTE — ANESTHESIA POSTPROCEDURE EVALUATION
Post-Anesthesia Evaluation and Assessment    Patient: Frank Ruiz MRN: [id number]  SSN: [id number]    YOB: 1960  Age: 62 y.o. Sex: female      Data from PACU flowsheet    Cardiovascular Function/Vital Signs  Visit Vitals    /80    Pulse (!) 105    Temp 36.7 °C (98 °F)    Resp 16    Ht 5' 4\" (1.626 m)    Wt 71.2 kg (157 lb)    SpO2 98%    Breastfeeding No    BMI 26.95 kg/m2       Patient is status post anesthesia    Nausea/Vomiting: controlled    Postoperative hydration reviewed and adequate. Pain:  Managed    Neurological Status: At baseline    Mental Status and Level of Consciousness: Alert and oriented     Pulmonary Status:   Adequate oxygenation and airway patent    Complications related to anesthesia: None    Post-anesthesia assessment completed.  No concerns    Signed By: Bandar Bradshaw CRNA     January 24, 2018

## 2018-01-24 NOTE — H&P
Date of Surgery Update:  Estelle Osborn was seen and examined. History and physical has been reviewed. The patient has been examined.  There have been no significant clinical changes since the completion of the originally dated History and Physical.    Signed By: Kortney Schmitt MD     January 24, 2018 9:24 AM

## 2018-01-26 ENCOUNTER — CLINICAL SUPPORT (OUTPATIENT)
Dept: FAMILY MEDICINE CLINIC | Age: 58
End: 2018-01-26

## 2018-01-26 NOTE — LETTER
NOTIFICATION RETURN TO WORK / SCHOOL 
 
1/26/2018 11:33 AM 
 
Ms. Rodney Calero [address] [facility] [phone] To Whom It May Concern: 
 
Rodney Calero is currently under the care of [facility] Balaji. She will return to work/school on: 1/29/2018 If there are questions or concerns please have the patient contact our office. Sincerely, Julianne Jackson MD

## 2018-01-31 ENCOUNTER — OFFICE VISIT (OUTPATIENT)
Dept: SURGERY | Age: 58
End: 2018-01-31

## 2018-01-31 VITALS
BODY MASS INDEX: 27.11 KG/M2 | RESPIRATION RATE: 16 BRPM | DIASTOLIC BLOOD PRESSURE: 87 MMHG | HEIGHT: 64 IN | WEIGHT: 158.8 LBS | SYSTOLIC BLOOD PRESSURE: 134 MMHG | TEMPERATURE: 97.8 F | OXYGEN SATURATION: 98 % | HEART RATE: 83 BPM

## 2018-01-31 DIAGNOSIS — R10.32 LEFT LOWER QUADRANT PAIN: Primary | ICD-10-CM

## 2018-01-31 NOTE — MR AVS SNAPSHOT
[facility] 
 
 
 [address] [facility] [address] [facility] [address] [address] 
[phone] Patient: Anuja Hernandez MRN: [id number] ZCC:[id number] Visit Information Date & Time Provider Department Dept. Phone Encounter #  
 1/31/2018  8:30 AM Hermelinda Oliveros MD [facility] [phone] [id number] Your Appointments 3/1/2018  3:45 PM  
Office Visit with Collette Rios MD  
[facility] ([address]) Appt Note: ov  
 [facility] [address] [phone]  
[phone]  
  
   
 [facility] [address]/2018  3:30 PM  
ROUTINE CARE with Augustine Almaraz MD  
[address] [place] [id number] [facility]) Appt Note: Return in about 3 months (around 4/19/2018), or if symptoms worsen or fail to improve, for diabetes. [address] [facility] [address] [facility]  
  
   
 [address] [facility] [address] [facility] [address] Upcoming Health Maintenance Date Due Pneumococcal 19-64 Medium Risk (1 of 1 - PPSV23) 2/4/1979 DTaP/Tdap/Td series (1 - Tdap) 2/4/1981 PAP AKA CERVICAL CYTOLOGY 2/4/1981 FOOT EXAM Q1 3/27/2018 MICROALBUMIN Q1 3/27/2018 LIPID PANEL Q1 3/27/2018 EYE EXAM RETINAL OR DILATED Q1 5/29/2018 HEMOGLOBIN A1C Q6M 7/19/2018 BREAST CANCER SCRN MAMMOGRAM 11/24/2019 COLONOSCOPY 3/3/2026 Allergies as of 1/31/2018  Review Complete On: 1/31/2018 By: Meredith Franco Severity Noted Reaction Type Reactions Macrodantin [Nitrofurantoin Macrocrystalline]  01/31/2012    Itching, Other (comments) Tape [Adhesive]  09/30/2014    Other (comments) Paper tape-- feels like burning skin Burned skin when had wound vac Current Immunizations  Reviewed on 2/22/2017 Name Date Influenza Vaccine 11/15/2016 Not reviewed this visit Vitals BP Pulse Temp Resp Height(growth percentile) Weight(growth percentile) 134/87 (BP 1 Location: Right arm, BP Patient Position: Sitting) 83 97.8 °F (36.6 °C) (Oral) 16 5' 4\" (1.626 m) 158 lb 12.8 oz (72 kg) SpO2 BMI OB Status Smoking Status 98% 27.26 kg/m2 Hysterectomy Never Smoker BMI and BSA Data Body Mass Index Body Surface Area  
 27.26 kg/m 2 1.8 m 2 Preferred Pharmacy Pharmacy Name Phone [facility] [address]  [facility] [phone] Your Updated Medication List  
  
   
This list is accurate as of: 1/31/18  8:52 AM.  Always use your most recent med list.  
  
  
  
  
 albuterol 90 mcg/actuation inhaler Commonly known as:  PROVENTIL HFA, VENTOLIN HFA, PROAIR HFA Take 1 Puff by inhalation every six (6) hours as needed for Wheezing. ALLERGY RELIEF (CETIRIZINE) 10 mg tablet Generic drug:  cetirizine  
take 1 tablet by mouth once daily  
  
 amLODIPine 2.5 mg tablet Commonly known as:  Tribal Fitzpatrick Take 1 Tab by mouth nightly. atorvastatin 40 mg tablet Commonly known as:  LIPITOR Take 1 Tab by mouth nightly. BENTYL 10 mg capsule Generic drug:  dicyclomine Take 10 mg by mouth 4 times daily (before meals and nightly). dapagliflozin 10 mg Tab tablet Commonly known as:  [employer] Take 1 Tab by mouth daily. Indications: type 2 diabetes mellitus  
  
 doxepin 10 mg capsule Commonly known as:  SINEquan Take 1 Cap by mouth nightly. glucose blood VI test strips strip Commonly known as:  ONETOUCH ULTRA TEST Check blood glucose as directed  
  
 metFORMIN 500 mg Tg24 24 hour tablet Commonly known as:  Lexii Estrada Take 500 mg by mouth two (2) times a day. omeprazole 10 mg capsule Commonly known as:  PRILOSEC Take 1 Cap by mouth daily. ondansetron hcl 4 mg tablet Commonly known as:  ZOFRAN (AS HYDROCHLORIDE) Take 1 Tab by mouth every eight (8) hours as needed for Nausea. polyethylene glycol 17 gram/dose powder Commonly known as:  Gillermina Fillmore Take 17 g by mouth daily. 1 capful with 8 oz of water daily  
  
 traMADol 50 mg tablet Commonly known as:  ULTRAM  
take 1-2 tablets by mouth every 6 hours if needed for pain ZIAC 2.5-6.25 mg per tablet Generic drug:  bisoprolol-hydroCHLOROthiazide Take 1 Tab by mouth daily. Patient Instructions If you have any questions or concerns about today's appointment, the verbal and/or written instructions you were given for follow up care, please call our office at [phone]. [facility] [address], [address] 
 
[phone] office [phone] Introducing [facility]! [facility] introduces [employer] patient portal. Now you can access parts of your medical record, email your doctor's office, and request medication refills online. 1. In your internet browser, go to [email or web address] 2. Click on the First Time User? Click Here link in the Sign In box. You will see the New Member Sign Up page. 3. Enter your [employer] Access Code exactly as it appears below. You will not need to use this code after youve completed the sign-up process. If you do not sign up before the expiration date, you must request a new code. · [employer] Access Code: [email or web address] Expires: 2/5/2018  7:27 AM 
 
4. Enter the last four digits of your Social Security Number (xxxx) and Date of Birth (mm/dd/yyyy) as indicated and click Submit. You will be taken to the next sign-up page. 5. Create a [employer] ID. This will be your [employer] login ID and cannot be changed, so think of one that is secure and easy to remember. 6. Create a [employer] password. You can change your password at any time. 7. Enter your Password Reset Question and Answer. This can be used at a later time if you forget your password. 8. Enter your e-mail address. You will receive e-mail notification when new information is available in [address] 9. Click Sign Up. You can now view and download portions of your medical record. 10. Click the Download Summary menu link to download a portable copy of your medical information. If you have questions, please visit the Frequently Asked Questions section of the [employer] website. Remember, [employer] is NOT to be used for urgent needs. For medical emergencies, dial 911. Now available from your iPhone and Android! Please provide this summary of care documentation to your next provider. Your primary care clinician is listed as Jadon Donnelly. If you have any questions after today's visit, please call [phone].

## 2018-01-31 NOTE — PATIENT INSTRUCTIONS
If you have any questions or concerns about today's appointment, the verbal and/or written instructions you were given for follow up care, please call our office at [phone].     [facility] - [address]    [phone] office  [phone]

## 2018-01-31 NOTE — LETTER
1/31/2018 8:40 AM 
 
Patient:  Mike Bowles YOB: 1960 Date of Visit: 1/31/2018 [place] 
[address] [facility]  [address] [address] VIA In Basket Dear Malvina Angelucci, MD, Thank you for referring Ms. Blayne Van to [place] [address] for evaluation and treatment. Below are the relevant portions of my assessment and plan of care. Thank you very much for your referral of Ms. Blayne Van. If you have questions, please do not hesitate to call me. I look forward to following Ms. Damian along with you and will keep you updated as to her progress. Sincerely, Juany Azul MD

## 2018-01-31 NOTE — PROGRESS NOTES
General Surgery Consult    Katelin Lopez  Admit date: (Not on file)    MRN: [id number]     : 1960     Age: 62 y.o. Attending Physician: Juan Bowens MD, [facility]      History of Present Illness:      Katelin Lopez is a 62 y.o. female who is very well known to me. She is presenting with abdominal pain, mainly in the left lower quadrant and the pelvic area. She has been diagnosed with a left ovarian cyst and she is seeing Dr. Perlita Chery tomorrow for surgical evaluation. She had a couple episodes of nausea and vomiting but in general she's doing very well having normal bowel movements. She had recent endoscopy by Dr. Lexi Martínez, and she was diagnosed with ileitis. She is here today to be evaluated for possible surgical intervention and to assist if needed with her ovarian cyst surgery.     Patient Active Problem List    Diagnosis Date Noted    Non-intractable cyclical vomiting with nausea 2018    Type 2 diabetes mellitus with nephropathy ([facility] [address] 2018    Chronic abdominal pain 2017    Post-operative pain 2017    SBO (small bowel obstruction) 2017    Osteoarthritis of left knee 2016    Hypertension 2016    Hyperlipidemia 2016    GERD (gastroesophageal reflux disease) 2016    Asthma 2016    Type 2 diabetes mellitus ([address] 2016    Sural neuritis 2014    Chest pain 2014    Essential hypertension, benign 2012     Past Medical History:   Diagnosis Date    Abdominal adhesions     Anemia     Arthritis     all over the body    Asthma     Cocaine abuse     Diabetes mellitus     Dyskinesia     bilateral    Essential hypertension     GERD (gastroesophageal reflux disease)     Hypertension     Menopause     Microhematuria     Stool color black       Past Surgical History:   Procedure Laterality Date    HX CHOLECYSTECTOMY  6/28/10    HX COLONOSCOPY      HX ENDOSCOPY      HX HERNIA REPAIR      HX HYSTERECTOMY      Fibroids    HX KNEE REPLACEMENT Left     HX OOPHORECTOMY  12/2000    HX ORTHOPAEDIC Right     ankle- multiple surgeries    HX SMALL BOWEL RESECTION  12/2000    HX SMALL BOWEL RESECTION  02/21/2017    Dr. Andrew Mcclain        Social History   Substance Use Topics    Smoking status: Never Smoker    Smokeless tobacco: Never Used    Alcohol use No      History   Smoking Status    Never Smoker   Smokeless Tobacco    Never Used     Family History   Problem Relation Age of Onset    Hypertension Other      parent    Breast Cancer Other 21    Heart Disease Father     Hypertension Father     Diabetes Father     Diabetes Mother     Hypertension Other      sibling    Diabetes Other      parent    Diabetes Sister     Kidney Disease Brother     Diabetes Brother       Current Outpatient Prescriptions   Medication Sig    dicyclomine (BENTYL) 10 mg capsule Take 10 mg by mouth 4 times daily (before meals and nightly).  traMADol (ULTRAM) 50 mg tablet take 1-2 tablets by mouth every 6 hours if needed for pain    ALLERGY RELIEF, CETIRIZINE, 10 mg tablet take 1 tablet by mouth once daily    doxepin (SINEQUAN) 10 mg capsule Take 1 Cap by mouth nightly.  ondansetron hcl (ZOFRAN, AS HYDROCHLORIDE,) 4 mg tablet Take 1 Tab by mouth every eight (8) hours as needed for Nausea.  metFORMIN (GLUMETZA) 500 mg TG24 24 hour tablet Take 500 mg by mouth two (2) times a day.  dapagliflozin (FARXIGA) 10 mg tab tablet Take 1 Tab by mouth daily. Indications: type 2 diabetes mellitus    omeprazole (PRILOSEC) 10 mg capsule Take 1 Cap by mouth daily.  albuterol (PROVENTIL HFA, VENTOLIN HFA, PROAIR HFA) 90 mcg/actuation inhaler Take 1 Puff by inhalation every six (6) hours as needed for Wheezing.  atorvastatin (LIPITOR) 40 mg tablet Take 1 Tab by mouth nightly.  amLODIPine (NORVASC) 2.5 mg tablet Take 1 Tab by mouth nightly.     glucose blood VI test strips [facility] ULTRA TEST) strip Check blood glucose as directed    bisoprolol-hydrochlorothiazide (ZIAC) 2.5-6.25 mg per tablet Take 1 Tab by mouth daily.  polyethylene glycol (MIRALAX) 17 gram/dose powder Take 17 g by mouth daily. 1 capful with 8 oz of water daily     No current facility-administered medications for this visit. Allergies   Allergen Reactions    Macrodantin [Nitrofurantoin Macrocrystalline] Itching and Other (comments)    Tape [Adhesive] Other (comments)     Paper tape-- feels like burning skin  Burned skin when had wound vac          Review of Systems:  Pertinent items are noted in the History of Present Illness. Objective:     Visit Vitals    /87 (BP 1 Location: Right arm, BP Patient Position: Sitting)    Pulse 83    Temp 97.8 °F (36.6 °C) (Oral)    Resp 16    Ht 5' 4\" (1.626 m)    Wt 72 kg (158 lb 12.8 oz)    SpO2 98%    BMI 27.26 kg/m2       Physical Exam:      General:  in no apparent distress, alert and oriented times 3   Eyes:  conjunctivae and sclerae normal, pupils equal, round, reactive to light   Throat & Neck: no erythema or exudates noted and neck supple and symmetrical; no palpable masses   Lungs:   clear to auscultation bilaterally   Heart:  Regular rate and rhythm   Abdomen:   rounded, soft, nontender except for left lower quadrant tenderness, nondistended, no masses or organomegaly. No abdominal wall hernias.     Extremities: extremities normal, atraumatic, no cyanosis or edema   Skin: Normal.       Imaging and Lab Review:     CBC:   Lab Results   Component Value Date/Time    WBC 10.3 08/15/2017 03:25 PM    RBC 4.58 08/15/2017 03:25 PM    HGB 13.5 08/15/2017 03:25 PM    HCT 40.2 08/15/2017 03:25 PM    PLATELET 123 20/27/7188 03:25 PM     BMP:   Lab Results   Component Value Date/Time    Glucose 229 08/15/2017 03:25 PM    Sodium 141 08/15/2017 03:25 PM    Potassium 4.1 08/15/2017 03:25 PM    Chloride 108 08/15/2017 03:25 PM    CO2 27 08/15/2017 03:25 PM    BUN 18 08/15/2017 03:25 PM    Creatinine 1.09 08/15/2017 03:25 PM    Calcium 8.8 08/15/2017 03:25 PM     CMP:  Lab Results   Component Value Date/Time    Glucose 229 08/15/2017 03:25 PM    Sodium 141 08/15/2017 03:25 PM    Potassium 4.1 08/15/2017 03:25 PM    Chloride 108 08/15/2017 03:25 PM    CO2 27 08/15/2017 03:25 PM    BUN 18 08/15/2017 03:25 PM    Creatinine 1.09 08/15/2017 03:25 PM    Calcium 8.8 08/15/2017 03:25 PM    Anion gap 6 08/15/2017 03:25 PM    BUN/Creatinine ratio 17 08/15/2017 03:25 PM    Alk. phosphatase 109 08/15/2017 03:25 PM    Protein, total 6.8 08/15/2017 03:25 PM    Albumin 2.8 08/15/2017 03:25 PM    Globulin 4.0 08/15/2017 03:25 PM    A-G Ratio 0.7 08/15/2017 03:25 PM       No results found for this or any previous visit (from the past 24 hour(s)). images and reports reviewed    Assessment:   Anjali Mazariegos is a 62 y.o. female is presenting with left lower quadrant and pelvic pain. I do not see any evidence of bowel obstruction. I'm not sure if her pain is related to her left ovarian cyst that most likely it is. I also explained to the patient that from her previous surgery she had severe adhesions and she is at high risk for bowel injury if another laparotomy is performed. She is also at high risk of fistula formation and hernia formation. So the decision to perform data ovarian cyst is going to be difficult. Plan:     No need for any general surgery intervention currently  Follow up with Dr. Nahomy Cerda.  If she thinks that her pain is related to her ovarian cyst, and she needs surgery, then I would be available to assist if needed with the lysis of adhesions    Please call me if you have any questions (cell phone: [phone])     Signed By: Atul Henao MD     January 31, 2018

## 2018-01-31 NOTE — PROGRESS NOTES
Anuja Hernandez is a 62 y.o. female who presents for a surgical evaluation of RLQ abdominal pain, which she scores as a 9/10. She has a history of a SBO s/p a SB resection in 2017. 1. Have you been to the ER, urgent care clinic since your last visit? Hospitalized since your last visit? Yes, SN-ED in 01/2018 for abdominal pain. 2. Have you seen or consulted any other health care providers outside of the [address] [place] [address] since your last visit? Include any pap smears or colon screening. Yes, see above. Patient verbally agrees to permit the medical students working in [address] [place] [address] office to observe and participate in medical care during the appointment today, including, where appropriate, providing direct medical care to patient under the physicians direct supervision. Patient agrees that he/she have been given the opportunity to refuse to give such consent and may withdraw consent at any time during appointment.

## 2018-02-01 RX ORDER — AMLODIPINE BESYLATE 2.5 MG/1
TABLET ORAL
Qty: 90 TAB | Refills: 1 | Status: SHIPPED | OUTPATIENT
Start: 2018-02-01 | End: 2018-12-11 | Stop reason: SDUPTHER

## 2018-02-21 ENCOUNTER — OFFICE VISIT (OUTPATIENT)
Dept: FAMILY MEDICINE CLINIC | Age: 58
End: 2018-02-21

## 2018-02-21 VITALS
HEIGHT: 64 IN | SYSTOLIC BLOOD PRESSURE: 138 MMHG | OXYGEN SATURATION: 100 % | WEIGHT: 162 LBS | HEART RATE: 80 BPM | TEMPERATURE: 97.8 F | BODY MASS INDEX: 27.66 KG/M2 | DIASTOLIC BLOOD PRESSURE: 98 MMHG | RESPIRATION RATE: 16 BRPM

## 2018-02-21 DIAGNOSIS — R10.9 CHRONIC ABDOMINAL PAIN: ICD-10-CM

## 2018-02-21 DIAGNOSIS — M25.531 BILATERAL WRIST PAIN: Primary | ICD-10-CM

## 2018-02-21 DIAGNOSIS — M25.532 BILATERAL WRIST PAIN: Primary | ICD-10-CM

## 2018-02-21 DIAGNOSIS — G44.229 CHRONIC TENSION-TYPE HEADACHE, NOT INTRACTABLE: ICD-10-CM

## 2018-02-21 DIAGNOSIS — G89.29 CHRONIC ABDOMINAL PAIN: ICD-10-CM

## 2018-02-21 DIAGNOSIS — E11.65 TYPE 2 DIABETES MELLITUS WITH HYPERGLYCEMIA, WITHOUT LONG-TERM CURRENT USE OF INSULIN (HCC): ICD-10-CM

## 2018-02-21 RX ORDER — OXYCODONE AND ACETAMINOPHEN 5; 325 MG/1; MG/1
1 TABLET ORAL
Qty: 12 TAB | Refills: 0 | Status: SHIPPED | OUTPATIENT
Start: 2018-02-21 | End: 2018-03-07 | Stop reason: SDUPTHER

## 2018-02-21 RX ORDER — PREDNISONE 20 MG/1
40 TABLET ORAL
Qty: 14 TAB | Refills: 0 | Status: SHIPPED | OUTPATIENT
Start: 2018-02-21 | End: 2018-03-07 | Stop reason: ALTCHOICE

## 2018-02-21 NOTE — PATIENT INSTRUCTIONS
Wrist: Exercises  Your Care Instructions  Here are some examples of exercises for your wrist. Start each exercise slowly. Ease off the exercise if you start to have pain. Your doctor or your physical or occupational therapist will tell you when you can start these exercises. He or she will also tell you which ones will work best for you. How to do the exercises  Prayer stretch    1. Start with your palms together in front of your chest just below your chin. 2. Slowly lower your hands toward your waistline, keeping your hands close to your stomach and your palms together until you feel a mild to moderate stretch under your forearms. 3. Hold for at least 15 to 30 seconds. Repeat 2 to 4 times. Wrist flexor stretch    1. Extend your arm in front of you with your palm up. 2. Bend your wrist, pointing your hand toward the floor. 3. With your other hand, gently bend your wrist farther until you feel a mild to moderate stretch in your forearm. 4. Hold for at least 15 to 30 seconds. Repeat 2 to 4 times. Wrist extensor stretch    1. Repeat steps 1 through 4 of the stretch above, but begin with your extended hand palm down. Follow-up care is a key part of your treatment and safety. Be sure to make and go to all appointments, and call your doctor if you are having problems. It's also a good idea to know your test results and keep a list of the medicines you take. Where can you learn more? Go to http://betzy-didi.info/. Enter [id number] [id number] 01 in the search box to learn more about \"Wrist: Exercises. \"  Current as of: March 21, 2017  Content Version: 11.4  © 7057-7234 [employer]. Care instructions adapted under license by [employer] (which disclaims liability or warranty for this information).  If you have questions about a medical condition or this instruction, always ask your healthcare professional. [address] any warranty or liability for your use of this information.

## 2018-02-21 NOTE — MR AVS SNAPSHOT
[address] [facility] 
 
 
 [address] [facility] [address] 
[phone] Patient: Jazmine Duarte MRN: [id number] IYM:[birth year] Visit Information Date & Time Provider Department Dept. Phone Encounter #  
 2/21/2018  4:00 PM Lucille Chapin, [place] [phone] [id number] Your Appointments 3/1/2018  3:45 PM  
Office Visit with Taryn Aylaa MD  
[address] ([address]) Appt Note: ov; ov  
 [facility] [address] [facility] [address]  
[phone]  
  
   
 [facility] [address] 4/19/2018  3:30 PM  
ROUTINE CARE with Lucille Chapin MD  
[address] [facility] [address] [place] [address]) Appt Note: Return in about 3 months (around 4/19/2018), or if symptoms worsen or fail to improve, for diabetes. [address] [place] [address] [facility]  
  
   
 [address] Upcoming Health Maintenance Date Due Pneumococcal 19-64 Medium Risk (1 of 1 - PPSV23) 2/4/1979 DTaP/Tdap/Td series (1 - Tdap) 2/4/1981 PAP AKA CERVICAL CYTOLOGY 2/4/1981 FOOT EXAM Q1 3/27/2018 MICROALBUMIN Q1 3/27/2018 LIPID PANEL Q1 3/27/2018 EYE EXAM RETINAL OR DILATED Q1 5/29/2018 HEMOGLOBIN A1C Q6M 7/19/2018 BREAST CANCER SCRN MAMMOGRAM 11/24/2019 COLONOSCOPY 3/3/2026 Allergies as of 2/21/2018  Review Complete On: 2/21/2018 By: Lucille Chapin MD  
  
 Severity Noted Reaction Type Reactions Macrodantin [Nitrofurantoin Macrocrystalline]  01/31/2012    Itching, Other (comments) Tape [Adhesive]  09/30/2014    Other (comments) Paper tape-- feels like burning skin Burned skin when had wound vac Current Immunizations  Reviewed on 2/22/2017 Name Date Influenza Vaccine 11/15/2016 Not reviewed this visit You Were Diagnosed With   
  
 Codes Comments Bilateral wrist pain    -  Primary ICD-10-CM: M25.531, M25.532 ICD-9-CM: 719.43 Chronic abdominal pain     ICD-10-CM: R10.9, G89.29 ICD-9-CM: 789.00, 338.29 Chronic tension-type headache, not intractable     ICD-10-CM: R68.706 ICD-9-CM: 339.12 Type 2 diabetes mellitus with hyperglycemia, without long-term current use of insulin (HCC)     ICD-10-CM: E11.65 ICD-9-CM: 250.00, 790.29 Vitals BP Pulse Temp Resp Height(growth percentile) Weight(growth percentile) (!) 138/98 80 97.8 °F (36.6 °C) (Oral) 16 5' 4\" (1.626 m) 162 lb (73.5 kg) SpO2 BMI OB Status Smoking Status 100% 27.81 kg/m2 Hysterectomy Never Smoker Vitals History BMI and BSA Data Body Mass Index Body Surface Area  
 27.81 kg/m 2 1.82 m 2 Preferred Pharmacy Pharmacy Name Phone [facility]  [facility] [phone] Your Updated Medication List  
  
   
This list is accurate as of 2/21/18  4:38 PM.  Always use your most recent med list.  
  
  
  
  
 albuterol 90 mcg/actuation inhaler Commonly known as:  PROVENTIL HFA, VENTOLIN HFA, PROAIR HFA Take 1 Puff by inhalation every six (6) hours as needed for Wheezing. ALLERGY RELIEF (CETIRIZINE) 10 mg tablet Generic drug:  cetirizine  
take 1 tablet by mouth once daily  
  
 amLODIPine 2.5 mg tablet Commonly known as:  NORVASC  
take 1 tablet by mouth NIGHTLY  
  
 atorvastatin 40 mg tablet Commonly known as:  LIPITOR Take 1 Tab by mouth nightly. BENTYL 10 mg capsule Generic drug:  dicyclomine Take 10 mg by mouth 4 times daily (before meals and nightly). dapagliflozin 10 mg Tab tablet Commonly known as:  [employer] Take 1 Tab by mouth daily. Indications: type 2 diabetes mellitus  
  
 doxepin 10 mg capsule Commonly known as:  SINEquan Take 1 Cap by mouth nightly. glucose blood VI test strips strip Commonly known as:  ONETOUCH ULTRA TEST  
 Check blood glucose as directed  
  
 metFORMIN 500 mg Tg24 24 hour tablet Commonly known as:  Valerie Pal Take 500 mg by mouth two (2) times a day. omeprazole 10 mg capsule Commonly known as:  PRILOSEC Take 1 Cap by mouth daily. ondansetron hcl 4 mg tablet Commonly known as:  ZOFRAN (AS HYDROCHLORIDE) Take 1 Tab by mouth every eight (8) hours as needed for Nausea. oxyCODONE-acetaminophen 5-325 mg per tablet Commonly known as:  PERCOCET Take 1 Tab by mouth every six (6) hours as needed for Pain. Max Daily Amount: 4 Tabs. polyethylene glycol 17 gram/dose powder Commonly known as:  Nehemias Paradise Take 17 g by mouth daily. 1 capful with 8 oz of water daily  
  
 predniSONE 20 mg tablet Commonly known as:  Verlee Sarath Take 2 Tabs by mouth daily (with breakfast). ZIAC 2.5-6.25 mg per tablet Generic drug:  bisoprolol-hydroCHLOROthiazide Take 1 Tab by mouth daily. Prescriptions Printed Refills  
 oxyCODONE-acetaminophen (PERCOCET) 5-325 mg per tablet 0 Sig: Take 1 Tab by mouth every six (6) hours as needed for Pain. Max Daily Amount: 4 Tabs. Class: Print Route: Oral  
  
Prescriptions Sent to Pharmacy Refills  
 predniSONE (DELTASONE) 20 mg tablet 0 Sig: Take 2 Tabs by mouth daily (with breakfast). Class: Normal  
 Pharmacy: [facility] [facility] #: [phone] Route: Oral  
  
Patient Instructions Wrist: Exercises Your Care Instructions Here are some examples of exercises for your wrist. Start each exercise slowly. Ease off the exercise if you start to have pain. Your doctor or your physical or occupational therapist will tell you when you can start these exercises. He or she will also tell you which ones will work best for you. How to do the exercises Prayer stretch 1. Start with your palms together in front of your chest just below your chin. 2. Slowly lower your hands toward your waistline, keeping your hands close to your stomach and your palms together until you feel a mild to moderate stretch under your forearms. 3. Hold for at least 15 to 30 seconds. Repeat 2 to 4 times. Wrist flexor stretch 1. Extend your arm in front of you with your palm up. 2. Bend your wrist, pointing your hand toward the floor. 3. With your other hand, gently bend your wrist farther until you feel a mild to moderate stretch in your forearm. 4. Hold for at least 15 to 30 seconds. Repeat 2 to 4 times. Wrist extensor stretch 1. Repeat steps 1 through 4 of the stretch above, but begin with your extended hand palm down. Follow-up care is a key part of your treatment and safety. Be sure to make and go to all appointments, and call your doctor if you are having problems. It's also a good idea to know your test results and keep a list of the medicines you take. Where can you learn more? Go to http://betzy-didi.info/. Enter [id number] in the search box to learn more about \"Wrist: Exercises. \" Current as of: March 21, 2017 Content Version: 11.4 © 3152-0584 [employer]. Care instructions adapted under license by [employer] (which disclaims liability or warranty for this information). If you have questions about a medical condition or this instruction, always ask your healthcare professional. [place] [address] any warranty or liability for your use of this information. Introducing [address]! Hermes Azar introduces [facility] patient portal. Now you can access parts of your medical record, email your doctor's office, and request medication refills online. 1. In your internet browser, go to [email or web address]. Mayi Zhaopin/[employer] 2. Click on the First Time User? Click Here link in the Sign In box. You will see the New Member Sign Up page. 3. Enter your [employer] Access Code exactly as it appears below. You will not need to use this code after youve completed the sign-up process. If you do not sign up before the expiration date, you must request a new code. · [employer] Access Code: [email or web address] Expires: 5/22/2018  4:38 PM 
 
4. Enter the last four digits of your Social Security Number (xxxx) and Date of Birth (mm/dd/yyyy) as indicated and click Submit. You will be taken to the next sign-up page. 5. Create a [employer] ID. This will be your [employer] login ID and cannot be changed, so think of one that is secure and easy to remember. 6. Create a [employer] password. You can change your password at any time. 7. Enter your Password Reset Question and Answer. This can be used at a later time if you forget your password. 8. Enter your e-mail address. You will receive e-mail notification when new information is available in [address]. 9. Click Sign Up. You can now view and download portions of your medical record. 10. Click the Download Summary menu link to download a portable copy of your medical information. If you have questions, please visit the Frequently Asked Questions section of the [employer] website. Remember, [employer] is NOT to be used for urgent needs. For medical emergencies, dial 911. Now available from your iPhone and Android! Please provide this summary of care documentation to your next provider. Your primary care clinician is listed as Leigh Ann Erazo. If you have any questions after today's visit, please call [phone].

## 2018-02-21 NOTE — PROGRESS NOTES
Tulio Aguilar is a 62 y.o.  female and presents with    Chief Complaint   Patient presents with    Wrist Pain    Headache     Subjective:  Wrist Pain  Patient complains of bilateral wrist pain. The pain is severe, worsens with movement, and some relief by rest.  There is not associated numbness, tingling, weakness in the bilateral hand. Pain has been present for 4 weeks. There is not a history of injury. 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 She has chronic headache with most recent episode lasting one month. She has increased pain with bending forward. She has new glasses. She denies scotomata. She denies nausea associated with headache. She has used nsaid without improvement; her  massaged her neck which made the headache worse.     She c/o knots in legs. She has had symptoms for several months. She denies pain or rashes. Patient Active Problem List   Diagnosis Code    Osteoarthritis of left knee M17.12    Hypertension I10    Hyperlipidemia E78.5    GERD (gastroesophageal reflux disease) K21.9    Asthma J45.909    Type 2 diabetes mellitus ([facility] [address] E11.9    SBO (small bowel obstruction) K56.609    Chronic abdominal pain R10.9, G89.29    Post-operative pain G89.18    Chest pain R07.9    Essential hypertension, benign I10    Sural neuritis G57.80    Type 2 diabetes mellitus with nephropathy ([facility]) E11.21    Non-intractable cyclical vomiting with nausea G43. A0     Patient Active Problem List    Diagnosis Date Noted    Non-intractable cyclical vomiting with nausea 01/24/2018    Type 2 diabetes mellitus with nephropathy ([facility] [address] 01/19/2018    Chronic abdominal pain 03/14/2017    Post-operative pain 03/14/2017    SBO (small bowel obstruction) 02/21/2017    Osteoarthritis of left knee 06/27/2016    Hypertension 06/27/2016    Hyperlipidemia 06/27/2016    GERD (gastroesophageal reflux disease) 06/27/2016    Asthma 06/27/2016    Type 2 diabetes mellitus ([facility] [address] 06/27/2016    Sural neuritis 06/23/2014    Chest pain 04/20/2014    Essential hypertension, benign 02/27/2012     Current Outpatient Prescriptions   Medication Sig Dispense Refill    amLODIPine (NORVASC) 2.5 mg tablet take 1 tablet by mouth NIGHTLY 90 Tab 1    dicyclomine (BENTYL) 10 mg capsule Take 10 mg by mouth 4 times daily (before meals and nightly).  traMADol (ULTRAM) 50 mg tablet take 1-2 tablets by mouth every 6 hours if needed for pain  0    ALLERGY RELIEF, CETIRIZINE, 10 mg tablet take 1 tablet by mouth once daily  0    doxepin (SINEQUAN) 10 mg capsule Take 1 Cap by mouth nightly. 30 Cap 11    ondansetron hcl (ZOFRAN, AS HYDROCHLORIDE,) 4 mg tablet Take 1 Tab by mouth every eight (8) hours as needed for Nausea. 12 Tab 0    metFORMIN (GLUMETZA) 500 mg TG24 24 hour tablet Take 500 mg by mouth two (2) times a day. 60 Tab 2    dapagliflozin (FARXIGA) 10 mg tab tablet Take 1 Tab by mouth daily. Indications: type 2 diabetes mellitus 90 Tab 3    omeprazole (PRILOSEC) 10 mg capsule Take 1 Cap by mouth daily. 30 Cap 11    albuterol (PROVENTIL HFA, VENTOLIN HFA, PROAIR HFA) 90 mcg/actuation inhaler Take 1 Puff by inhalation every six (6) hours as needed for Wheezing. 1 Inhaler 0    polyethylene glycol (MIRALAX) 17 gram/dose powder Take 17 g by mouth daily. 1 capful with 8 oz of water daily 119 g 0    atorvastatin (LIPITOR) 40 mg tablet Take 1 Tab by mouth nightly. 90 Tab 3    glucose blood VI test strips (ONETOUCH ULTRA TEST) strip Check blood glucose as directed 100 Strip 1    bisoprolol-hydrochlorothiazide (ZIAC) 2.5-6.25 mg per tablet Take 1 Tab by mouth daily.        Allergies   Allergen Reactions    Macrodantin [Nitrofurantoin Macrocrystalline] Itching and Other (comments)    Tape [Adhesive] Other (comments)     Paper tape-- feels like burning skin  Burned skin when had wound vac     Past Medical History:   Diagnosis Date    Abdominal adhesions     Anemia     Arthritis     all over the body    Asthma     Cocaine abuse     Diabetes mellitus     Dyskinesia     bilateral    Essential hypertension     GERD (gastroesophageal reflux disease)     Hypertension     Menopause     Microhematuria     Stool color black      Past Surgical History:   Procedure Laterality Date    HX CHOLECYSTECTOMY  6/28/10    HX COLONOSCOPY      HX ENDOSCOPY      HX HERNIA REPAIR      HX HYSTERECTOMY      Fibroids    HX KNEE REPLACEMENT Left     HX OOPHORECTOMY  12/2000    HX ORTHOPAEDIC Right     ankle- multiple surgeries    HX SMALL BOWEL RESECTION  12/2000    HX SMALL BOWEL RESECTION  02/21/2017    Dr. Rob Oro       Family History   Problem Relation Age of Onset    Hypertension Other      parent    Breast Cancer Other 21    Heart Disease Father     Hypertension Father     Diabetes Father     Diabetes Mother     Hypertension Other      sibling    Diabetes Other      parent    Diabetes Sister     Kidney Disease Brother     Diabetes Brother      Social History   Substance Use Topics    Smoking status: Never Smoker    Smokeless tobacco: Never Used    Alcohol use No       ROS   General ROS: positive for  - night sweats  negative for - chills, fatigue or fever  Psychological ROS: positive for - depression  Ophthalmic ROS: positive for - uses glasses  ENT ROS: negative for - nasal congestion or sore throat  Endocrine ROS: negative for - polydipsia/polyuria or temperature intolerance  Respiratory ROS: no cough, shortness of breath, or wheezing  Cardiovascular ROS: no chest pain or dyspnea on exertion  Genito-Urinary ROS: no dysuria, trouble voiding, or hematuria  Neurological ROS: negative for - numbness/tingling or weakness  Dermatological ROS: negative for - rash or skin lesion changes     All other systems reviewed and are negative.       Objective:  Vitals:    02/21/18 1558 02/21/18 1606   BP: (!) 132/102 (!) 138/98   Pulse: 80    Resp: 16    Temp: 97.8 °F (36.6 °C)    TempSrc: Oral    SpO2: 100%    Weight: 162 lb (73.5 kg)    Height: 5' 4\" (1.626 m)    PainSc:   8    PainLoc: Wrist      alert, well appearing, and in no distress, oriented to person, place, and time and obese  Mental status - normal mood, behavior, speech, dress, motor activity, and thought processes  Musculoskeletal - wrist exam:     Left wrist: Tender to palpation joint line. Right wrist: ttp along joint line      Assessment/Plan:    1. Bilateral wrist pain  Ddx: carpal tunnel syndrome, arthritis  - predniSONE (DELTASONE) 20 mg tablet; Take 2 Tabs by mouth daily (with breakfast). Dispense: 14 Tab; Refill: 0  - oxyCODONE-acetaminophen (PERCOCET) 5-325 mg per tablet; Take 1 Tab by mouth every six (6) hours as needed for Pain. Max Daily Amount: 4 Tabs. Dispense: 12 Tab; Refill: 0    2. Chronic abdominal pain  Pain management   - oxyCODONE-acetaminophen (PERCOCET) 5-325 mg per tablet; Take 1 Tab by mouth every six (6) hours as needed for Pain. Max Daily Amount: 4 Tabs. Dispense: 12 Tab; Refill: 0    3. Chronic tension-type headache, not intractable  Continue treatment    4. Type 2 diabetes mellitus with hyperglycemia, without long-term current use of insulin ([facility])  Goal hgb a1c <7; at goal with current treatment      Lab review: labs reviewed, I note that glycosylated hemoglobin normal      I have discussed the diagnosis with the patient and the intended plan as seen in the above orders. The patient has received an after-visit summary and questions were answered concerning future plans. I have discussed medication side effects and warnings with the patient as well. I have reviewed the plan of care with the patient, accepted their input and they are in agreement with the treatment goals. Follow-up Disposition:  Return in about 1 month (around 3/21/2018) for wrist pain.

## 2018-02-21 NOTE — PROGRESS NOTES
Harsha Yates is a 62 y.o. female  Chief Complaint   Patient presents with    Wrist Pain    Headache     1. Have you been to the ER, urgent care clinic since your last visit? Hospitalized since your last visit? No    2. Have you seen or consulted any other health care providers outside of the [address] [place] [address] since your last visit? Include any pap smears or colon screening.  No

## 2018-02-27 DIAGNOSIS — R51.9 NEW ONSET HEADACHE: Primary | ICD-10-CM

## 2018-03-01 ENCOUNTER — OFFICE VISIT (OUTPATIENT)
Dept: OBGYN CLINIC | Age: 58
End: 2018-03-01

## 2018-03-01 VITALS
HEART RATE: 75 BPM | BODY MASS INDEX: 27.66 KG/M2 | WEIGHT: 162 LBS | SYSTOLIC BLOOD PRESSURE: 137 MMHG | HEIGHT: 64 IN | DIASTOLIC BLOOD PRESSURE: 89 MMHG

## 2018-03-01 DIAGNOSIS — R10.2 CHRONIC FEMALE PELVIC PAIN: Primary | ICD-10-CM

## 2018-03-01 DIAGNOSIS — G89.29 CHRONIC FEMALE PELVIC PAIN: Primary | ICD-10-CM

## 2018-03-07 ENCOUNTER — OFFICE VISIT (OUTPATIENT)
Dept: FAMILY MEDICINE CLINIC | Age: 58
End: 2018-03-07

## 2018-03-07 VITALS
HEART RATE: 66 BPM | BODY MASS INDEX: 27.93 KG/M2 | RESPIRATION RATE: 17 BRPM | HEIGHT: 64 IN | SYSTOLIC BLOOD PRESSURE: 139 MMHG | DIASTOLIC BLOOD PRESSURE: 90 MMHG | WEIGHT: 163.6 LBS | OXYGEN SATURATION: 99 % | TEMPERATURE: 96.2 F

## 2018-03-07 DIAGNOSIS — M25.531 BILATERAL WRIST PAIN: ICD-10-CM

## 2018-03-07 DIAGNOSIS — M25.532 BILATERAL WRIST PAIN: ICD-10-CM

## 2018-03-07 DIAGNOSIS — G44.229 CHRONIC TENSION-TYPE HEADACHE, NOT INTRACTABLE: Primary | ICD-10-CM

## 2018-03-07 DIAGNOSIS — R10.9 CHRONIC ABDOMINAL PAIN: ICD-10-CM

## 2018-03-07 DIAGNOSIS — G89.29 CHRONIC ABDOMINAL PAIN: ICD-10-CM

## 2018-03-07 RX ORDER — OXYCODONE AND ACETAMINOPHEN 5; 325 MG/1; MG/1
1 TABLET ORAL
Qty: 12 TAB | Refills: 0 | Status: SHIPPED | OUTPATIENT
Start: 2018-03-07 | End: 2018-07-29

## 2018-03-07 RX ORDER — SUMATRIPTAN 6 MG/.5ML
6 INJECTION, SOLUTION SUBCUTANEOUS ONCE
Qty: 0.5 ML | Refills: 0
Start: 2018-03-07 | End: 2018-03-07

## 2018-03-07 RX ORDER — KETOROLAC TROMETHAMINE 30 MG/ML
30 INJECTION, SOLUTION INTRAMUSCULAR; INTRAVENOUS ONCE
Qty: 1 VIAL | Refills: 0
Start: 2018-03-07 | End: 2018-03-07

## 2018-03-07 NOTE — PROGRESS NOTES
1. Have you been to the ER, urgent care clinic since your last visit? Hospitalized since your last visit? No    2. Have you seen or consulted any other health care providers outside of the [address] [place] [address] since your last visit? Include any pap smears or colon screening.  No     Health Maintenance Due   Topic Date Due    Pneumococcal 19-64 Medium Risk (1 of 1 - PPSV23) 02/04/1979    DTaP/Tdap/Td series (1 - Tdap) 02/04/1981    PAP AKA CERVICAL CYTOLOGY  02/04/1981    FOOT EXAM Q1  03/27/2018    MICROALBUMIN Q1  03/27/2018    LIPID PANEL Q1  03/27/2018

## 2018-03-07 NOTE — PROGRESS NOTES
History and Physical    Patient: Augustine Lora MRN: [id number]  SSN: [id number]    YOB: 1960  Age: 62 y.o. Sex: female      Subjective:      HPI: Headaches    Augustine Lora is a 62 y.o. female with an extensive PMH who presents today with worsening headaches. Current headache started on Friday and patient has been keeping the light off at home to reduce the pain. She has tried Aleeve, Advil, and Tylenol with no relief of symptoms. It has been increasingly difficult to sleep but Percocet's have improved the pain. Patient has an MRI booked for tomorrow. She recently had a nosebleed on Saturday with increasing fatigue. There has been no injury to her neck. No change in vision, N/V, weakness, dizziness, loss of sensation, change in smell, seizures, or loss of balance. Patient has a hx of HTN and Diabetes. Reports adherence to medications but did not take BP meds this morning. States her sugars have been avg at 120 fasting and she hasn't noticed a correlation of headaches with hyperglycemia or hypoglycemia.      Past Medical History:   Diagnosis Date    Abdominal adhesions     Anemia     Arthritis     all over the body    Asthma     Cocaine abuse     Diabetes mellitus     Dyskinesia     bilateral    Essential hypertension     GERD (gastroesophageal reflux disease)     Hypertension     Menopause     Microhematuria     Stool color black      Past Surgical History:   Procedure Laterality Date    HX CHOLECYSTECTOMY  6/28/10    HX COLONOSCOPY      HX ENDOSCOPY      HX HERNIA REPAIR      HX HYSTERECTOMY      Fibroids    HX KNEE REPLACEMENT Left     HX OOPHORECTOMY  12/2000    HX ORTHOPAEDIC Right     ankle- multiple surgeries    HX SMALL BOWEL RESECTION  12/2000    HX SMALL BOWEL RESECTION  02/21/2017    Dr. Ceja List        Family History   Problem Relation Age of Onset    Hypertension Other      parent    Breast Cancer Other 21  Heart Disease Father     Hypertension Father     Diabetes Father     Diabetes Mother     Hypertension Other      sibling    Diabetes Other      parent    Diabetes Sister     Kidney Disease Brother     Diabetes Brother      Social History   Substance Use Topics    Smoking status: Never Smoker    Smokeless tobacco: Never Used    Alcohol use No      Prior to Admission medications    Medication Sig Start Date End Date Taking? Authorizing Provider   oxyCODONE-acetaminophen (PERCOCET) 5-325 mg per tablet Take 1 Tab by mouth every six (6) hours as needed for Pain. Max Daily Amount: 4 Tabs. 3/7/18  Yes Jolene Elizabeth MD   ketorolac (TORADOL) 30 mg/mL (1 mL) injection 1 mL by IntraVENous route once for 1 dose. 3/7/18 3/7/18 Yes Jolene Elizabeth MD   SUMAtriptan (IMITREX) 6 mg/0.5 mL injection 0.5 mL by SubCUTAneous route once for 1 dose. 3/7/18 3/7/18 Yes Jolene Elizabeth MD   amLODIPine (NORVASC) 2.5 mg tablet take 1 tablet by mouth NIGHTLY 2/1/18  Yes Jolene Elizabeth MD   dicyclomine (BENTYL) 10 mg capsule Take 10 mg by mouth 4 times daily (before meals and nightly). Yes Historical Provider   ALLERGY RELIEF, CETIRIZINE, 10 mg tablet take 1 tablet by mouth once daily 12/6/17  Yes Historical Provider   doxepin (SINEQUAN) 10 mg capsule Take 1 Cap by mouth nightly. 1/19/18  Yes Jolene Elizabeth MD   ondansetron hcl (ZOFRAN, AS HYDROCHLORIDE,) 4 mg tablet Take 1 Tab by mouth every eight (8) hours as needed for Nausea. 1/11/18  Yes Jolene Elizabeth MD   metFORMIN (GLUMETZA) 500 mg TG24 24 hour tablet Take 500 mg by mouth two (2) times a day. 12/4/17  Yes Jolene Elizabeth MD   dapagliflozin (FARXIGA) 10 mg tab tablet Take 1 Tab by mouth daily. Indications: type 2 diabetes mellitus 11/22/17  Yes Jolene Elizabeth MD   omeprazole (PRILOSEC) 10 mg capsule Take 1 Cap by mouth daily.  11/22/17  Yes Jolene Elizabeth MD   albuterol (PROVENTIL HFA, VENTOLIN HFA, PROAIR HFA) 90 mcg/actuation inhaler Take 1 Puff by inhalation every six (6) hours as needed for Wheezing. 9/21/17  Yes Ariadne Hickman MD   polyethylene glycol (MIRALAX) 17 gram/dose powder Take 17 g by mouth daily. 1 capful with 8 oz of water daily 8/15/17  Yes Kalani Arana PA-C   atorvastatin (LIPITOR) 40 mg tablet Take 1 Tab by mouth nightly. 7/11/17  Yes Ariadne Hickman MD   glucose blood VI test strips (ONETOUCH ULTRA TEST) strip Check blood glucose as directed 4/27/17  Yes Ariadne Hickman MD   bisoprolol-hydrochlorothiazide [facility]) 2.5-6.25 mg per tablet Take 1 Tab by mouth daily. Yes Yamel Lwaton MD        Allergies   Allergen Reactions    Macrodantin [Nitrofurantoin Macrocrystalline] Itching and Other (comments)    Tape [Adhesive] Other (comments)     Paper tape-- feels like burning skin  Burned skin when had wound vac       Review of Systems:  Pertinent items are noted in the History of Present Illness. Objective:     Vitals:    03/07/18 0927   BP: 139/90   Pulse: 66   Resp: 17   Temp: 96.2 °F (35.7 °C)   TempSrc: Oral   SpO2: 99%   Weight: 163 lb 9.6 oz (74.2 kg)   Height: 5' 4\" (1.626 m)        Physical Exam:  GENERAL: alert, cooperative, no distress, appears stated age  EYE: conjunctivae/corneas clear. PERRL, EOM's intact. LYMPHATIC: Cervical, supraclavicular, and axillary nodes normal.   THROAT & NECK: normal and no erythema or exudates noted. LUNG: clear to auscultation bilaterally  HEART: regular rate and rhythm, S1, S2 normal, no murmur, click, rub or gallop  ABDOMEN: soft, mid-epigastric tenderness,  Bowel sounds normal. No masses,  no organomegaly  EXTREMITIES:  extremities normal, atraumatic, no cyanosis or edema, no edema, significant tenderness in cervical region  NEUROLOGIC: AOx3. Gait normal. Reflexes and motor strength normal and symmetric. Cranial nerves 2-12 and sensation grossly intact. Assessment/Plan:      1.  Headaches - MRI booked for tomorrow     Orders Placed This Encounter    KETOROLAC TROMETHAMINE INJ    oxyCODONE-acetaminophen (PERCOCET) 5-325 mg per tablet    ketorolac (TORADOL) 30 mg/mL (1 mL) injection    SUMAtriptan (IMITREX) 6 mg/0.5 mL injection     2. Diabetes - c/o on current meds  3. HTN - c/o on current meds  4. GERD  - c/o on current meds       Signed By: Brooke Hansen     March 7, 2018      *ATTENTION:  This note has been created by a medical student for educational purposes only. Please do not refer to the content of this note for clinical decision-making, billing, or other purposes. Please see attending physicians note to obtain clinical information on this patient. *

## 2018-03-07 NOTE — MR AVS SNAPSHOT
[facility] 
 
 
 [address] [address] [facility] [address] [address] 
[phone] Patient: Floyd Smith MRN: [id number] AYH:[birth year] Visit Information Date & Time Provider Department Dept. Phone Encounter #  
 3/7/2018  9:30 AM Ash Graf, [address] [facility] [phone] [id number] Follow-up Instructions Return if symptoms worsen or fail to improve. Your Appointments 4/19/2018  3:30 PM  
ROUTINE CARE with Ash Graf MD  
[address] [address]) Appt Note: Return in about 3 months (around 4/19/2018), or if symptoms worsen or fail to improve, for diabetes. [address] [address] [facility] [address]  
  
   
 [address] [address] Upcoming Health Maintenance Date Due Pneumococcal 19-64 Medium Risk (1 of 1 - PPSV23) 2/4/1979 DTaP/Tdap/Td series (1 - Tdap) 2/4/1981 PAP AKA CERVICAL CYTOLOGY 2/4/1981 FOOT EXAM Q1 3/27/2018 MICROALBUMIN Q1 3/27/2018 LIPID PANEL Q1 3/27/2018 EYE EXAM RETINAL OR DILATED Q1 5/29/2018 HEMOGLOBIN A1C Q6M 7/19/2018 BREAST CANCER SCRN MAMMOGRAM 11/24/2019 COLONOSCOPY 3/3/2026 Allergies as of 3/7/2018  Review Complete On: 3/7/2018 By: Ash Graf MD  
  
 Severity Noted Reaction Type Reactions Macrodantin [Nitrofurantoin Macrocrystalline]  01/31/2012    Itching, Other (comments) Tape [Adhesive]  09/30/2014    Other (comments) Paper tape-- feels like burning skin Burned skin when had wound vac Current Immunizations  Reviewed on 2/22/2017 Name Date Influenza Vaccine 11/15/2016 Not reviewed this visit You Were Diagnosed With   
  
 Codes Comments Chronic tension-type headache, not intractable    -  Primary ICD-10-CM: K03.848 ICD-9-CM: 339.12 Chronic abdominal pain     ICD-10-CM: R10.9, G89.29 ICD-9-CM: 789.00, 338.29   
 Bilateral wrist pain     ICD-10-CM: M25.531, M25.532 ICD-9-CM: 719.43 Vitals BP Pulse Temp Resp Height(growth percentile) Weight(growth percentile) 139/90 (BP 1 Location: Right arm, BP Patient Position: Sitting) 66 96.2 °F (35.7 °C) (Oral) 17 5' 4\" (1.626 m) 163 lb 9.6 oz (74.2 kg) SpO2 BMI OB Status Smoking Status 99% 28.08 kg/m2 Hysterectomy Never Smoker BMI and BSA Data Body Mass Index Body Surface Area 28.08 kg/m 2 1.83 m 2 Preferred Pharmacy Pharmacy Name Phone [facility] [address]  [facility] [phone] Your Updated Medication List  
  
   
This list is accurate as of 3/7/18 10:27 AM.  Always use your most recent med list.  
  
  
  
  
 albuterol 90 mcg/actuation inhaler Commonly known as:  PROVENTIL HFA, VENTOLIN HFA, PROAIR HFA Take 1 Puff by inhalation every six (6) hours as needed for Wheezing. ALLERGY RELIEF (CETIRIZINE) 10 mg tablet Generic drug:  cetirizine  
take 1 tablet by mouth once daily  
  
 amLODIPine 2.5 mg tablet Commonly known as:  NORVASC  
take 1 tablet by mouth NIGHTLY  
  
 atorvastatin 40 mg tablet Commonly known as:  LIPITOR Take 1 Tab by mouth nightly. BENTYL 10 mg capsule Generic drug:  dicyclomine Take 10 mg by mouth 4 times daily (before meals and nightly). dapagliflozin 10 mg Tab tablet Commonly known as:  [employer] Take 1 Tab by mouth daily. Indications: type 2 diabetes mellitus  
  
 doxepin 10 mg capsule Commonly known as:  SINEquan Take 1 Cap by mouth nightly. glucose blood VI test strips strip Commonly known as:  ONETOUCH ULTRA TEST Check blood glucose as directed  
  
 ketorolac 30 mg/mL (1 mL) injection Commonly known as:  TORADOL  
1 mL by IntraVENous route once for 1 dose. metFORMIN 500 mg Tg24 24 hour tablet Commonly known as:  Gwynneth Quivers Take 500 mg by mouth two (2) times a day. omeprazole 10 mg capsule Commonly known as:  PRILOSEC Take 1 Cap by mouth daily. ondansetron hcl 4 mg tablet Commonly known as:  ZOFRAN (AS HYDROCHLORIDE) Take 1 Tab by mouth every eight (8) hours as needed for Nausea. oxyCODONE-acetaminophen 5-325 mg per tablet Commonly known as:  PERCOCET Take 1 Tab by mouth every six (6) hours as needed for Pain. Max Daily Amount: 4 Tabs. polyethylene glycol 17 gram/dose powder Commonly known as:  Reche Agreste Take 17 g by mouth daily. 1 capful with 8 oz of water daily SUMAtriptan 6 mg/0.5 mL injection Commonly known as:  IMITREX  
0.5 mL by SubCUTAneous route once for 1 dose. ZIAC 2.5-6.25 mg per tablet Generic drug:  bisoprolol-hydroCHLOROthiazide Take 1 Tab by mouth daily. Prescriptions Printed Refills  
 oxyCODONE-acetaminophen (PERCOCET) 5-325 mg per tablet 0 Sig: Take 1 Tab by mouth every six (6) hours as needed for Pain. Max Daily Amount: 4 Tabs. Class: Print Route: Oral  
  
We Performed the Following KETOROLAC TROMETHAMINE INJ [ [facility] [place]] Follow-up Instructions Return if symptoms worsen or fail to improve. To-Do List   
 03/08/2018 4:30 PM  
  Appointment with [facility] MRI RM 1 at [address] [facility] ([phone]) GENERAL INSTRUCTIONS  Bring information (ID card) if you have any medically implanted devices. You will be required to lie still while the procedure is being performed. Remove any jewelry (including body piercing, hairpins) prior to MRI. If you have had a creatinine level drawn within the past 30 days, please bring most recent results to your appt. Bring any films, CD's, and reports related to your study with you on the day of your exam.  This only includes studies done outside of [address] [place] [address], [facility], [place] and [place].   Bring a complete list of all medications you are currently taking to include prescriptions, over-the-counter meds, herbals, vitamins & any dietary supplements. If you were given medications for claustrophobia or anxiety, please arrange to have someone drive you to your appointment. QUESTIONS  Notify the MRI Department if you have any questions concerning your study. [place] - [phone] [address] - [phone] Introducing [facility]! Carmelita Fothergill introduces [employer] (formerly OpenERP) patient portal. Now you can access parts of your medical record, email your doctor's office, and request medication refills online. 1. In your internet browser, go to [email or web address] 2. Click on the First Time User? Click Here link in the Sign In box. You will see the New Member Sign Up page. 3. Enter your Odoo (formerly OpenERP) Access Code exactly as it appears below. You will not need to use this code after youve completed the sign-up process. If you do not sign up before the expiration date, you must request a new code. · [employer]doo (formerly OpenERP) Access Code: [email or web address] Expires: 5/22/2018  4:38 PM 
 
4. Enter the last four digits of your Social Security Number (xxxx) and Date of Birth (mm/dd/yyyy) as indicated and click Submit. You will be taken to the next sign-up page. 5. Create a Odoo (formerly OpenERP) ID. This will be your [employer]o (formerly OpenERP) login ID and cannot be changed, so think of one that is secure and easy to remember. 6. Create a [employer]o (formerly Open[employer]P) password. You can change your password at any time. 7. Enter your Password Reset Question and Answer. This can be used at a later time if you forget your password. 8. Enter your e-mail address. You will receive e-mail notification when new information is available in [address] 9. Click Sign Up. You can now view and download portions of your medical record. 10. Click the Download Summary menu link to download a portable copy of your medical information. If you have questions, please visit the Frequently Asked Questions section of the [employer] (formerly OpenERP) website.  Remember, [employer] (formerly OpenERP) is NOT to be used for urgent needs. For medical emergencies, dial 911. Now available from your iPhone and Android! Please provide this summary of care documentation to your next provider. Your primary care clinician is listed as Mirta Hansen. If you have any questions after today's visit, please call [phone].

## 2018-03-07 NOTE — PROGRESS NOTES
Rigoberto Dubin is a 62 y.o.  female and presents with    Chief Complaint   Patient presents with    Headache    Neck Pain     Subjective:  Ms. Sharona Rock presents today with worsening headaches. Current headache started on Friday and patient has been keeping the light off at home to reduce the pain. She has tried Aleeve, Advil, and Tylenol with no relief of symptoms. It has been increasingly difficult to sleep but Percocet's have improved the pain. Patient has an MRI booked for tomorrow. She recently had a nosebleed on Saturday with increasing fatigue. There has been no injury to her neck. No change in vision, N/V, weakness, dizziness, loss of sensation, change in smell, seizures, or loss of balance.      Patient has a hx of HTN and Diabetes. Reports adherence to medications but did not take BP meds this morning. States her sugars have been avg at 120 fasting and she hasn't noticed a correlation of headaches with hyperglycemia or hypoglycemia. Patient Active Problem List   Diagnosis Code    Osteoarthritis of left knee M17.12    Hypertension I10    Hyperlipidemia E78.5    GERD (gastroesophageal reflux disease) K21.9    Asthma J45.909    Type 2 diabetes mellitus ([facility] E11.9    SBO (small bowel obstruction) K56.609    Chronic abdominal pain R10.9, G89.29    Post-operative pain G89.18    Chest pain R07.9    Essential hypertension, benign I10    Sural neuritis G57.80    Type 2 diabetes mellitus with nephropathy (HCC) E11.21    Non-intractable cyclical vomiting with nausea G43. A0     Patient Active Problem List    Diagnosis Date Noted    Non-intractable cyclical vomiting with nausea 01/24/2018    Type 2 diabetes mellitus with nephropathy ([facility] 01/19/2018    Chronic abdominal pain 03/14/2017    Post-operative pain 03/14/2017    SBO (small bowel obstruction) 02/21/2017    Osteoarthritis of left knee 06/27/2016    Hypertension 06/27/2016    Hyperlipidemia 06/27/2016    GERD (gastroesophageal reflux disease) 06/27/2016    Asthma 06/27/2016    Type [facility] 06/27/2016    Sural neuritis 06/23/2014    Chest pain 04/20/2014    Essential hypertension, benign 02/27/2012     Current Outpatient Prescriptions   Medication Sig Dispense Refill    oxyCODONE-acetaminophen (PERCOCET) 5-325 mg per tablet Take 1 Tab by mouth every six (6) hours as needed for Pain. Max Daily Amount: 4 Tabs. 12 Tab 0    amLODIPine (NORVASC) 2.5 mg tablet take 1 tablet by mouth NIGHTLY 90 Tab 1    dicyclomine (BENTYL) 10 mg capsule Take 10 mg by mouth 4 times daily (before meals and nightly).  ALLERGY RELIEF, CETIRIZINE, 10 mg tablet take 1 tablet by mouth once daily  0    doxepin (SINEQUAN) 10 mg capsule Take 1 Cap by mouth nightly. 30 Cap 11    ondansetron hcl (ZOFRAN, AS HYDROCHLORIDE,) 4 mg tablet Take 1 Tab by mouth every eight (8) hours as needed for Nausea. 12 Tab 0    metFORMIN (GLUMETZA) 500 mg TG24 24 hour tablet Take 500 mg by mouth two (2) times a day. 60 Tab 2    dapagliflozin (FARXIGA) 10 mg tab tablet Take 1 Tab by mouth daily. Indications: type 2 diabetes mellitus 90 Tab 3    omeprazole (PRILOSEC) 10 mg capsule Take 1 Cap by mouth daily. 30 Cap 11    albuterol (PROVENTIL HFA, VENTOLIN HFA, PROAIR HFA) 90 mcg/actuation inhaler Take 1 Puff by inhalation every six (6) hours as needed for Wheezing. 1 Inhaler 0    polyethylene glycol (MIRALAX) 17 gram/dose powder Take 17 g by mouth daily. 1 capful with 8 oz of water daily 119 g 0    atorvastatin (LIPITOR) 40 mg tablet Take 1 Tab by mouth nightly. 90 Tab 3    glucose blood VI test strips (ONETOUCH ULTRA TEST) strip Check blood glucose as directed 100 Strip 1    bisoprolol-hydrochlorothiazide (ZIAC) 2.5-6.25 mg per tablet Take 1 Tab by mouth daily.        Allergies   Allergen Reactions    Macrodantin [Nitrofurantoin Macrocrystalline] Itching and Other (comments)    Tape [Adhesive] Other (comments)     Paper tape-- feels like burning skin  Burned skin when had wound vac     Past Medical History:   Diagnosis Date    Abdominal adhesions     Anemia     Arthritis     all over the body    Asthma     Cocaine abuse     Diabetes mellitus     Dyskinesia     bilateral    Essential hypertension     GERD (gastroesophageal reflux disease)     Hypertension     Menopause     Microhematuria     Stool color black      Past Surgical History:   Procedure Laterality Date    HX CHOLECYSTECTOMY  6/28/10    HX COLONOSCOPY      HX ENDOSCOPY      HX HERNIA REPAIR      HX HYSTERECTOMY      Fibroids    HX KNEE REPLACEMENT Left     HX OOPHORECTOMY  12/2000    HX ORTHOPAEDIC Right     ankle- multiple surgeries    HX SMALL BOWEL RESECTION  12/2000    HX SMALL BOWEL RESECTION  02/21/2017    Dr. Jemal Baldwin       Family History   Problem Relation Age of Onset    Hypertension Other      parent    Breast Cancer Other 21    Heart Disease Father     Hypertension Father     Diabetes Father     Diabetes Mother     Hypertension Other      sibling    Diabetes Other      parent    Diabetes Sister     Kidney Disease Brother     Diabetes Brother      Social History   Substance Use Topics    Smoking status: Never Smoker    Smokeless tobacco: Never Used    Alcohol use No       ROS   General ROS: positive for  - night sweats  negative for - chills, fatigue or fever  Psychological ROS: positive for - depression  Ophthalmic ROS: positive for - uses glasses  ENT ROS: negative for - nasal congestion or sore throat  Endocrine ROS: negative for - polydipsia/polyuria or temperature intolerance  Respiratory ROS: no cough, shortness of breath, or wheezing  Cardiovascular ROS: no chest pain or dyspnea on exertion  Genito-Urinary ROS: no dysuria, trouble voiding, or hematuria  Neurological ROS: negative for - numbness/tingling or weakness  Dermatological ROS: negative for - rash or skin lesion changes All other systems reviewed and are negative. Objective:  Vitals:    03/07/18 0927   BP: 139/90   Pulse: 66   Resp: 17   Temp: 96.2 °F (35.7 °C)   TempSrc: Oral   SpO2: 99%   Weight: 163 lb 9.6 oz (74.2 kg)   Height: 5' 4\" (1.626 m)   PainSc:  10 - Worst pain ever   PainLoc: Head     GENERAL: alert, cooperative, no distress, appears stated age  EYE: conjunctivae/corneas clear. PERRL, EOM's intact. LYMPHATIC: Cervical, supraclavicular, and axillary nodes normal.   THROAT & NECK: normal and no erythema or exudates noted. LUNG: clear to auscultation bilaterally  HEART: regular rate and rhythm, S1, S2 normal, no murmur, click, rub or gallop  ABDOMEN: soft, mid-epigastric tenderness,  Bowel sounds normal. No masses,  no organomegaly  EXTREMITIES:  extremities normal, atraumatic, no cyanosis or edema, no edema, significant tenderness in cervical region  NEUROLOGIC: AOx3. Gait normal. Reflexes and motor strength normal and symmetric. Cranial nerves 2-12 and sensation grossly intact. Assessment/Plan:    1. Chronic tension-type headache, not intractable  Recommend cervical spine exercises as headache improves with massage; NSAID given in office today  - ketorolac (TORADOL) 30 mg/mL (1 mL) injection; 1 mL by IntraVENous route once for 1 dose. Dispense: 1 Vial; Refill: 0  - KETOROLAC TROMETHAMINE INJ  - SUMAtriptan (IMITREX) 6 mg/0.5 mL injection; 0.5 mL by SubCUTAneous route once for 1 dose. Dispense: 0.5 mL; Refill: 0  2. Chronic abdominal pain  This is a chronic problem that is stable. Per review of available records and patients , there are not sign of overuse, misuse, diversion, or concerning side effects. Today we reviewed: the risk of overdose, addiction, and dependency proper storage and disposal of medications the goals of treatment (improve functionality, quality of life, and pain)  The following changes were made to the patients current treatment plan: medications adjusted, see orders.       - oxyCODONE-acetaminophen (PERCOCET) 5-325 mg per tablet; Take 1 Tab by mouth every six (6) hours as needed for Pain. Max Daily Amount: 4 Tabs. Dispense: 12 Tab; Refill: 0    3. Bilateral wrist pain  Continue pain management  - oxyCODONE-acetaminophen (PERCOCET) 5-325 mg per tablet; Take 1 Tab by mouth every six (6) hours as needed for Pain. Max Daily Amount: 4 Tabs. Dispense: 12 Tab; Refill: 0          Lab review: no lab studies available for review at time of visit      I have discussed the diagnosis with the patient and the intended plan as seen in the above orders. The patient has received an after-visit summary and questions were answered concerning future plans. I have discussed medication side effects and warnings with the patient as well. I have reviewed the plan of care with the patient, accepted their input and they are in agreement with the treatment goals. Follow-up Disposition:  Return if symptoms worsen or fail to improve.

## 2018-03-07 NOTE — LETTER
NOTIFICATION RETURN TO WORK  
 
3/7/2018 10:13 AM 
 
Ms. Thomas Salazar [address] [facility] [address] [id number] To Whom It May Concern: 
 
Thomas Salazar is currently under the care of [address] [place] [address]. She will return to work on: 3/8/2018 If there are questions or concerns please have the patient contact our office. Sincerely, Addison Ayon MD

## 2018-03-10 ENCOUNTER — HOSPITAL ENCOUNTER (OUTPATIENT)
Dept: MRI IMAGING | Age: 58
Discharge: HOME | End: 2018-03-10
Attending: FAMILY MEDICINE
Payer: COMMERCIAL

## 2018-03-10 DIAGNOSIS — R51.9 NEW ONSET HEADACHE: ICD-10-CM

## 2018-03-10 PROCEDURE — 70551 MRI BRAIN STEM W/O DYE: CPT

## 2018-03-12 ENCOUNTER — TELEPHONE (OUTPATIENT)
Dept: FAMILY MEDICINE CLINIC | Age: 58
End: 2018-03-12

## 2018-03-12 NOTE — TELEPHONE ENCOUNTER
Pt is checking the status of her MRI that was done recently. l informed the patient that the result still in process. Pt then stated that she  would like to get a call back as soon as Dr. Etienne Omer reviewed her result.

## 2018-03-14 NOTE — PROGRESS NOTES
63 y/o with chronic abdominal/pelvic pain presents to the office for follow-up. She has had many abdominal procedures, the last of which was by   Dr. Marcell Lutz. She reports he indicated there were many adhesions. She continues to have sharp and intermittent pain. It keeps her  Up at night and is decreasing her quality of life. She has has a full hysterectomy and has a persistent peritoneal inclusion cyst.  She has a 4.4 cm simple appearing lesion in the left adnexal region on her most recent CT scan, which is slightly larger than seen previously in December. She has been to the ED numerous times for pain and take narcotics intermittently. She is concerned that the cyst is causing her pain and possible removal, but we have discussed many times the benign appearance of it and the quality and character of her pain that isn't consistent with cystic pain. ROS: per above    Visit Vitals    /89    Pulse 75    Ht 5' 4\" (1.626 m)    Wt 162 lb (73.5 kg)    BMI 27.81 kg/m2     Gen: A&OX3, NAD  Abdomen: protuberant, multiple incisions noted, diffusely TTP, noted sharp pain with movement. SVE: NEFG, normal appearing cuff. BME: no palpable masses. TTP bilaterally; + guarding; no rebound    CT: 1. No intestinal obstruction  -Enteroenteric anastomosis left lower quadrant  2. Abnormal smooth moderate wall thickening duodenum through jejunum, interval worse. Nonspecific  -Atypical appearance for acute inflammatory enteritis. Infarction not specifically suggested. Infiltrative process, including lymphoma, possible. -Recommend GI referral, urgency determined by clinical picture   4. 4 cm cystic lesion, probably simple cyst, left adnexal region, slightly larger compared 12/17. This is most likely a benign ovarian cyst but needs further evaluation  -Recommend nonemergent pelvic ultrasound to hopefully confirm simple cyst  5.  Mostly or entirely water attenuation dilated foci within left renal pelvis are most likely parapelvic cysts and not dilated collecting system    A/P:  61 y/o with chronic abdominal pelvic pain. Many causes of abdominal pelvic pain reviewed. Pt. Has known severe adhesive disease with h/o post-op complications noted. Discussed in detail the risks for bowel injury/involvment, protracted post-op course, and large possibility the pain is NOT alleviated by the cyst removal surgery. Discussed need for general surgery involvement intraoperatively. Pt. Is undecided about how she will proceed. RTO prn.

## 2018-03-15 ENCOUNTER — APPOINTMENT (OUTPATIENT)
Dept: GENERAL RADIOLOGY | Age: 58
End: 2018-03-15
Attending: PHYSICIAN ASSISTANT
Payer: COMMERCIAL

## 2018-03-15 ENCOUNTER — HOSPITAL ENCOUNTER (EMERGENCY)
Age: 58
Discharge: HOME | End: 2018-03-15
Attending: EMERGENCY MEDICINE
Payer: COMMERCIAL

## 2018-03-15 VITALS
HEIGHT: 64 IN | BODY MASS INDEX: 27.66 KG/M2 | DIASTOLIC BLOOD PRESSURE: 98 MMHG | RESPIRATION RATE: 16 BRPM | WEIGHT: 162 LBS | TEMPERATURE: 98.1 F | SYSTOLIC BLOOD PRESSURE: 136 MMHG | OXYGEN SATURATION: 98 % | HEART RATE: 87 BPM

## 2018-03-15 DIAGNOSIS — R10.32 ABDOMINAL PAIN, LLQ (LEFT LOWER QUADRANT): Primary | ICD-10-CM

## 2018-03-15 DIAGNOSIS — R11.0 NAUSEA WITHOUT VOMITING: ICD-10-CM

## 2018-03-15 DIAGNOSIS — R19.7 DIARRHEA, UNSPECIFIED TYPE: ICD-10-CM

## 2018-03-15 LAB
ANION GAP SERPL CALC-SCNC: 4 MMOL/L (ref 3–18)
APPEARANCE UR: CLEAR
BACTERIA URNS QL MICRO: NEGATIVE /HPF
BASOPHILS # BLD: 0 K/UL (ref 0–0.06)
BASOPHILS NFR BLD: 0 % (ref 0–2)
BILIRUB UR QL: NEGATIVE
BUN SERPL-MCNC: 18 MG/DL (ref 7–18)
BUN/CREAT SERPL: 16 (ref 12–20)
CALCIUM SERPL-MCNC: 9.9 MG/DL (ref 8.5–10.1)
CHLORIDE SERPL-SCNC: 105 MMOL/L (ref 100–108)
CO2 SERPL-SCNC: 29 MMOL/L (ref 21–32)
COLOR UR: YELLOW
CREAT SERPL-MCNC: 1.16 MG/DL (ref 0.6–1.3)
DIFFERENTIAL METHOD BLD: ABNORMAL
EOSINOPHIL # BLD: 0.1 K/UL (ref 0–0.4)
EOSINOPHIL NFR BLD: 1 % (ref 0–5)
EPITH CASTS URNS QL MICRO: NORMAL /LPF (ref 0–5)
ERYTHROCYTE [DISTWIDTH] IN BLOOD BY AUTOMATED COUNT: 13.8 % (ref 11.6–14.5)
GLUCOSE SERPL-MCNC: 120 MG/DL (ref 74–99)
GLUCOSE UR STRIP.AUTO-MCNC: 500 MG/DL
HCT VFR BLD AUTO: 46.7 % (ref 35–45)
HGB BLD-MCNC: 15.2 G/DL (ref 12–16)
HGB UR QL STRIP: ABNORMAL
KETONES UR QL STRIP.AUTO: NEGATIVE MG/DL
LEUKOCYTE ESTERASE UR QL STRIP.AUTO: NEGATIVE
LIPASE SERPL-CCNC: 371 U/L (ref 73–393)
LYMPHOCYTES # BLD: 2.3 K/UL (ref 0.9–3.6)
LYMPHOCYTES NFR BLD: 32 % (ref 21–52)
MCH RBC QN AUTO: 28.5 PG (ref 24–34)
MCHC RBC AUTO-ENTMCNC: 32.5 G/DL (ref 31–37)
MCV RBC AUTO: 87.6 FL (ref 74–97)
MONOCYTES # BLD: 0.6 K/UL (ref 0.05–1.2)
MONOCYTES NFR BLD: 8 % (ref 3–10)
NEUTS SEG # BLD: 4.3 K/UL (ref 1.8–8)
NEUTS SEG NFR BLD: 59 % (ref 40–73)
NITRITE UR QL STRIP.AUTO: NEGATIVE
PH UR STRIP: 5.5 [PH] (ref 5–8)
PLATELET # BLD AUTO: 265 K/UL (ref 135–420)
PMV BLD AUTO: 10.7 FL (ref 9.2–11.8)
POTASSIUM SERPL-SCNC: 3.6 MMOL/L (ref 3.5–5.5)
PROT UR STRIP-MCNC: NEGATIVE MG/DL
RBC # BLD AUTO: 5.33 M/UL (ref 4.2–5.3)
RBC #/AREA URNS HPF: NORMAL /HPF (ref 0–5)
SODIUM SERPL-SCNC: 138 MMOL/L (ref 136–145)
SP GR UR REFRACTOMETRY: 1.01 (ref 1–1.03)
UROBILINOGEN UR QL STRIP.AUTO: 0.2 EU/DL (ref 0.2–1)
WBC # BLD AUTO: 7.2 K/UL (ref 4.6–13.2)
WBC URNS QL MICRO: NORMAL /HPF (ref 0–4)

## 2018-03-15 PROCEDURE — 99283 EMERGENCY DEPT VISIT LOW MDM: CPT

## 2018-03-15 PROCEDURE — 74011250636 HC RX REV CODE- 250/636: Performed by: PHYSICIAN ASSISTANT

## 2018-03-15 PROCEDURE — 96374 THER/PROPH/DIAG INJ IV PUSH: CPT

## 2018-03-15 PROCEDURE — 85025 COMPLETE CBC W/AUTO DIFF WBC: CPT | Performed by: PHYSICIAN ASSISTANT

## 2018-03-15 PROCEDURE — 74018 RADEX ABDOMEN 1 VIEW: CPT

## 2018-03-15 PROCEDURE — 83690 ASSAY OF LIPASE: CPT | Performed by: PHYSICIAN ASSISTANT

## 2018-03-15 PROCEDURE — 96375 TX/PRO/DX INJ NEW DRUG ADDON: CPT

## 2018-03-15 PROCEDURE — 81001 URINALYSIS AUTO W/SCOPE: CPT | Performed by: PHYSICIAN ASSISTANT

## 2018-03-15 PROCEDURE — 80048 BASIC METABOLIC PNL TOTAL CA: CPT | Performed by: PHYSICIAN ASSISTANT

## 2018-03-15 RX ORDER — KETOROLAC TROMETHAMINE 30 MG/ML
30 INJECTION, SOLUTION INTRAMUSCULAR; INTRAVENOUS
Status: COMPLETED | OUTPATIENT
Start: 2018-03-15 | End: 2018-03-15

## 2018-03-15 RX ORDER — ONDANSETRON 2 MG/ML
4 INJECTION INTRAMUSCULAR; INTRAVENOUS
Status: COMPLETED | OUTPATIENT
Start: 2018-03-15 | End: 2018-03-15

## 2018-03-15 RX ADMIN — KETOROLAC TROMETHAMINE 30 MG: 30 INJECTION, SOLUTION INTRAMUSCULAR at 18:06

## 2018-03-15 RX ADMIN — ONDANSETRON 4 MG: 2 INJECTION INTRAMUSCULAR; INTRAVENOUS at 18:05

## 2018-03-15 NOTE — ED PROVIDER NOTES
HPI Comments: Patient is a 63 y/o female w/ PMH chronic abdominal pain, anemia, DM, HTN, GERD, asthma, ovarian cyst, who presents to the ER c/o left lower quadrant pain, diarrhea and nausea. Patient states her symptoms began last Friday after eating a piece of pizza while at work. Patient was just seen on 3/6/18 at [facility] for similar complaints. She is scheduled for surgery later this month for scar tissue revision by Dr. Deniz Cochran and Dr. Velasquez Mercedes. She states her pain suddenly worsened today while trying to  a child at work. She rates her pain in her LLQ and groin 10/10. She has had some associated nausea but no vomiting. She has not taken anything for her symptoms. She denied all other complaints. Patient is a 62 y.o. female presenting with abdominal pain and frequency. The history is provided by the patient. Abdominal Pain    Associated symptoms include diarrhea, nausea, vomiting and frequency. Pertinent negatives include no fever, no dysuria, no headaches and no chest pain. Urinary Frequency    Associated symptoms include nausea, vomiting, frequency and abdominal pain. Pertinent negatives include no chills.         Past Medical History:   Diagnosis Date    Abdominal adhesions     Anemia     Arthritis     all over the body    Asthma     Cocaine abuse     Diabetes mellitus     Dyskinesia     bilateral    Essential hypertension     GERD (gastroesophageal reflux disease)     Hypertension     Menopause     Microhematuria     Stool color black        Past Surgical History:   Procedure Laterality Date    HX CHOLECYSTECTOMY  6/28/10    HX COLONOSCOPY      HX ENDOSCOPY      HX HERNIA REPAIR      HX HYSTERECTOMY      Fibroids    HX KNEE REPLACEMENT Left     HX OOPHORECTOMY  12/2000    HX ORTHOPAEDIC Right     ankle- multiple surgeries    HX SMALL BOWEL RESECTION  12/2000    HX SMALL BOWEL RESECTION  02/21/2017    Dr. Ashley Ortiz History:   Problem Relation Age of Onset    Hypertension Other      parent    Breast Cancer Other 21    Heart Disease Father     Hypertension Father     Diabetes Father     Diabetes Mother     Hypertension Other      sibling    Diabetes Other      parent    Diabetes Sister     Kidney Disease Brother     Diabetes Brother        Social History     Social History    Marital status:      Spouse name: N/A    Number of children: N/A    Years of education: N/A     Occupational History    Not on file. Social History Main Topics    Smoking status: Never Smoker    Smokeless tobacco: Never Used    Alcohol use No    Drug use: No    Sexual activity: Yes     Partners: Male     Birth control/ protection: None     Other Topics Concern    Not on file     Social History Narrative         ALLERGIES: Macrodantin [nitrofurantoin macrocrystalline] and Tape [adhesive]    Review of Systems   Constitutional: Negative for chills, fatigue and fever. HENT: Negative. Negative for sore throat. Eyes: Negative. Respiratory: Negative for cough and shortness of breath. Cardiovascular: Negative for chest pain and palpitations. Gastrointestinal: Positive for abdominal pain, diarrhea, nausea and vomiting. Genitourinary: Positive for frequency. Negative for dysuria. Musculoskeletal: Negative. Skin: Negative. Neurological: Negative for dizziness, weakness, light-headedness and headaches. Psychiatric/Behavioral: Negative. All other systems reviewed and are negative. Vitals:    03/15/18 1625   BP: (!) 136/98   Pulse: 87   Resp: 16   Temp: 98.1 °F (36.7 °C)   SpO2: 98%   Weight: 73.5 kg (162 lb)   Height: 5' 4\" (1.626 m)            Physical Exam   Constitutional: She is oriented to person, place, and time. She appears well-developed and well-nourished. No distress. HENT:   Head: Normocephalic and atraumatic.    Mouth/Throat: Oropharynx is clear and moist.   Eyes: Conjunctivae are normal. No scleral icterus. Neck: Neck supple. No JVD present. No tracheal deviation present. Cardiovascular: Normal rate, regular rhythm and normal heart sounds. Pulmonary/Chest: Effort normal and breath sounds normal. No respiratory distress. She has no wheezes. Abdominal: Soft. Normal appearance and bowel sounds are normal. She exhibits no distension and no mass. There is tenderness in the right lower quadrant and left lower quadrant. There is no rebound, no guarding and no CVA tenderness. Midline well healed scar in center of abdomen   Musculoskeletal: Normal range of motion. Neurological: She is alert and oriented to person, place, and time. She has normal strength. Gait normal. GCS eye subscore is 4. GCS verbal subscore is 5. GCS motor subscore is 6. Skin: Skin is warm and dry. She is not diaphoretic. Psychiatric: She has a normal mood and affect. Nursing note and vitals reviewed. MDM  Number of Diagnoses or Management Options  Diagnosis management comments: 5:51 PM  61 y/o female c/o abd pain, N/V/D onset last Friday. Symptoms have been intermittent since. Tolerating PO with no difficulty today. Review of her chart shows multiple visits for same complaints; already followed by GI, OBGYN and General Surgery. No peritoneal signs on exam.  All labs unremarkable. UA with no signs of infection. KUB with no signs of obstruction or free air under diaphragm. Discussed all results with pt. Will plan on meds for relief while here and instructed on outpatient follow up. No indication for further imaging at this time. All questions answered and patient in agreement with plan of care. Will plan for discharge.   Arnulfo Luna PA-C    Clinical Impression:  Chronic abd pain, N/V, diarrhea       Amount and/or Complexity of Data Reviewed  Clinical lab tests: ordered and reviewed  Tests in the radiology section of CPT®: ordered and reviewed    Risk of Complications, Morbidity, and/or Mortality  Presenting problems: moderate  Diagnostic procedures: moderate  Management options: moderate    Patient Progress  Patient progress: stable        ED Course       Procedures           Vitals:  Patient Vitals for the past 12 hrs:   Temp Pulse Resp BP SpO2   03/15/18 1625 98.1 °F (36.7 °C) 87 16 (!) 136/98 98 %         Medications ordered:   Medications   ketorolac (TORADOL) injection 30 mg (not administered)   ondansetron (ZOFRAN) injection 4 mg (not administered)         Lab findings:  Recent Results (from the past 12 hour(s))   CBC WITH AUTOMATED DIFF    Collection Time: 03/15/18  4:35 PM   Result Value Ref Range    WBC 7.2 4.6 - 13.2 K/uL    RBC 5.33 (H) 4.20 - 5.30 M/uL    HGB 15.2 12.0 - 16.0 g/dL    HCT 46.7 (H) 35.0 - 45.0 %    MCV 87.6 74.0 - 97.0 FL    MCH 28.5 24.0 - 34.0 PG    MCHC 32.5 31.0 - 37.0 g/dL    RDW 13.8 11.6 - 14.5 %    PLATELET 947 194 - 712 K/uL    MPV 10.7 9.2 - 11.8 FL    NEUTROPHILS 59 40 - 73 %    LYMPHOCYTES 32 21 - 52 %    MONOCYTES 8 3 - 10 %    EOSINOPHILS 1 0 - 5 %    BASOPHILS 0 0 - 2 %    ABS. NEUTROPHILS 4.3 1.8 - 8.0 K/UL    ABS. LYMPHOCYTES 2.3 0.9 - 3.6 K/UL    ABS. MONOCYTES 0.6 0.05 - 1.2 K/UL    ABS. EOSINOPHILS 0.1 0.0 - 0.4 K/UL    ABS.  BASOPHILS 0.0 0.0 - 0.06 K/UL    DF AUTOMATED     METABOLIC PANEL, BASIC    Collection Time: 03/15/18  4:35 PM   Result Value Ref Range    Sodium 138 136 - 145 mmol/L    Potassium 3.6 3.5 - 5.5 mmol/L    Chloride 105 100 - 108 mmol/L    CO2 29 21 - 32 mmol/L    Anion gap 4 3.0 - 18 mmol/L    Glucose 120 (H) 74 - 99 mg/dL    BUN 18 7.0 - 18 MG/DL    Creatinine 1.16 0.6 - 1.3 MG/DL    BUN/Creatinine ratio 16 12 - 20      GFR est AA 58 (L) >60 ml/min/1.73m2    GFR est non-AA 48 (L) >60 ml/min/1.73m2    Calcium 9.9 8.5 - 10.1 MG/DL   LIPASE    Collection Time: 03/15/18  4:35 PM   Result Value Ref Range    Lipase 371 73 - 393 U/L   URINALYSIS W/ RFLX MICROSCOPIC    Collection Time: 03/15/18  5:26 PM   Result Value Ref Range    Color YELLOW      Appearance CLEAR      Specific gravity 1.009 1.005 - 1.030      pH (UA) 5.5 5.0 - 8.0      Protein NEGATIVE  NEG mg/dL    Glucose 500 (A) NEG mg/dL    Ketone NEGATIVE  NEG mg/dL    Bilirubin NEGATIVE  NEG      Blood SMALL (A) NEG      Urobilinogen 0.2 0.2 - 1.0 EU/dL    Nitrites NEGATIVE  NEG      Leukocyte Esterase NEGATIVE  NEG     URINE MICROSCOPIC ONLY    Collection Time: 03/15/18  5:26 PM   Result Value Ref Range    WBC NONE 0 - 4 /hpf    RBC 0 to 3 0 - 5 /hpf    Epithelial cells FEW 0 - 5 /lpf    Bacteria NEGATIVE  NEG /hpf       EKG interpretation by ED Physician:      X-Ray, CT or other radiology findings or impressions:  XR ABD (KUB)    (Results Pending)       Progress notes, Consult notes or additional Procedure notes:       Reevaluation of patient:       Disposition:  Diagnosis:   1. Abdominal pain, LLQ (left lower quadrant)    2. Nausea without vomiting    3. Diarrhea, unspecified type        Disposition: Discharged    Follow-up Information     Follow up With Details Comments Contact [facility]  If symptoms worsen [address] [place] [address]    Mirta Hansen MD Call in 3 days As needed for ER follow up [address] [place] [address]  [phone]             Patient's Medications   Start Taking    No medications on file   Continue Taking    ALBUTEROL (PROVENTIL HFA, VENTOLIN HFA, PROAIR HFA) 90 MCG/ACTUATION INHALER    Take 1 Puff by inhalation every six (6) hours as needed for Wheezing. ALLERGY RELIEF, CETIRIZINE, 10 MG TABLET    take 1 tablet by mouth once daily    AMLODIPINE (NORVASC) 2.5 MG TABLET    take 1 tablet by mouth NIGHTLY    ATORVASTATIN (LIPITOR) 40 MG TABLET    Take 1 Tab by mouth nightly. BISOPROLOL-HYDROCHLOROTHIAZIDE (ZIAC) 2.5-6.25 MG PER TABLET    Take 1 Tab by mouth daily. DAPAGLIFLOZIN (FARXIGA) 10 MG TAB TABLET    Take 1 Tab by mouth daily.  Indications: type 2 diabetes mellitus DICYCLOMINE (BENTYL) 10 MG CAPSULE    Take 10 mg by mouth 4 times daily (before meals and nightly). DOXEPIN (SINEQUAN) 10 MG CAPSULE    Take 1 Cap by mouth nightly. GLUCOSE BLOOD VI TEST STRIPS (ONETOUCH ULTRA TEST) STRIP    Check blood glucose as directed    METFORMIN (GLUMETZA) 500 MG TG24 24 HOUR TABLET    Take 500 mg by mouth two (2) times a day. OMEPRAZOLE (PRILOSEC) 10 MG CAPSULE    Take 1 Cap by mouth daily. ONDANSETRON HCL (ZOFRAN, AS HYDROCHLORIDE,) 4 MG TABLET    Take 1 Tab by mouth every eight (8) hours as needed for Nausea. OXYCODONE-ACETAMINOPHEN (PERCOCET) 5-325 MG PER TABLET    Take 1 Tab by mouth every six (6) hours as needed for Pain. Max Daily Amount: 4 Tabs. POLYETHYLENE GLYCOL (MIRALAX) 17 GRAM/DOSE POWDER    Take 17 g by mouth daily.  1 capful with 8 oz of water daily   These Medications have changed    No medications on file   Stop Taking    No medications on file

## 2018-03-15 NOTE — ED NOTES
I performed a brief evaluation, including history and physical, of the patient here in triage and I have determined that pt will need further treatment and evaluation from the main side ER physician. I have placed initial orders to help in expediting patients care.      March 15, 2018 at 4:27 PM - Kaci Fitzpatrick PA-C        Visit Vitals    BP (!) 136/98 (BP 1 Location: Right arm, BP Patient Position: At rest)    Pulse 87    Temp 98.1 °F (36.7 °C)    Resp 16    Ht 5' 4\" (1.626 m)    Wt 73.5 kg (162 lb)    SpO2 98%    BMI 27.81 kg/m2

## 2018-03-15 NOTE — DISCHARGE INSTRUCTIONS
Abdominal Pain: Care Instructions  Your Care Instructions    Abdominal pain has many possible causes. Some aren't serious and get better on their own in a few days. Others need more testing and treatment. If your pain continues or gets worse, you need to be rechecked and may need more tests to find out what is wrong. You may need surgery to correct the problem. Don't ignore new symptoms, such as fever, nausea and vomiting, urination problems, pain that gets worse, and dizziness. These may be signs of a more serious problem. Your doctor may have recommended a follow-up visit in the next 8 to 12 hours. If you are not getting better, you may need more tests or treatment. The doctor has checked you carefully, but problems can develop later. If you notice any problems or new symptoms, get medical treatment right away. Follow-up care is a key part of your treatment and safety. Be sure to make and go to all appointments, and call your doctor if you are having problems. It's also a good idea to know your test results and keep a list of the medicines you take. How can you care for yourself at home? · Rest until you feel better. · To prevent dehydration, drink plenty of fluids, enough so that your urine is light yellow or clear like water. Choose water and other caffeine-free clear liquids until you feel better. If you have kidney, heart, or liver disease and have to limit fluids, talk with your doctor before you increase the amount of fluids you drink. · If your stomach is upset, eat mild foods, such as rice, dry toast or crackers, bananas, and applesauce. Try eating several small meals instead of two or three large ones. · Wait until 48 hours after all symptoms have gone away before you have spicy foods, alcohol, and drinks that contain caffeine. · Do not eat foods that are high in fat. · Avoid anti-inflammatory medicines such as aspirin, ibuprofen (Advil, Motrin), and naproxen (Aleve).  These can cause stomach upset. Talk to your doctor if you take daily aspirin for another health problem. When should you call for help? Call 911 anytime you think you may need emergency care. For example, call if:  ? · You passed out (lost consciousness). ? · You pass maroon or very bloody stools. ? · You vomit blood or what looks like coffee grounds. ? · You have new, severe belly pain. ?Call your doctor now or seek immediate medical care if:  ? · Your pain gets worse, especially if it becomes focused in one area of your belly. ? · You have a new or higher fever. ? · Your stools are black and look like tar, or they have streaks of blood. ? · You have unexpected vaginal bleeding. ? · You have symptoms of a urinary tract infection. These may include:  ¨ Pain when you urinate. ¨ Urinating more often than usual.  ¨ Blood in your urine. ? · You are dizzy or lightheaded, or you feel like you may faint. ? Watch closely for changes in your health, and be sure to contact your doctor if:  ? · You are not getting better after 1 day (24 hours). Where can you learn more? Go to http://[employer] Cornerdidi.info/. Enter T689 in the search box to learn more about \"Abdominal Pain: Care Instructions. \"  Current as of: March 20, 2017  Content Version: 11.4  © 4813-8853 [employer] Care instructions adapted under license by [employer] (which disclaims liability or warranty for this information). If you have questions about a medical condition or this instruction, always ask your healthcare professional. [place] [address] any warranty or liability for your use of this information. Diarrhea: Care Instructions  Your Care Instructions    Diarrhea is loose, watery stools (bowel movements). The exact cause is often hard to find. Sometimes diarrhea is your body's way of getting rid of what caused an upset stomach.  Viruses, food poisoning, and many medicines can cause diarrhea. Some people get diarrhea in response to emotional stress, anxiety, or certain foods. Almost everyone has diarrhea now and then. It usually isn't serious, and your stools will return to normal soon. The important thing to do is replace the fluids you have lost, so you can prevent dehydration. The doctor has checked you carefully, but problems can develop later. If you notice any problems or new symptoms, get medical treatment right away. Follow-up care is a key part of your treatment and safety. Be sure to make and go to all appointments, and call your doctor if you are having problems. It's also a good idea to know your test results and keep a list of the medicines you take. How can you care for yourself at home? · Watch for signs of dehydration, which means your body has lost too much water. Dehydration is a serious condition and should be treated right away. Signs of dehydration are:  ¨ Increasing thirst and dry eyes and mouth. ¨ Feeling faint or lightheaded. ¨ Darker urine, and a smaller amount of urine than normal.  · To prevent dehydration, drink plenty of fluids, enough so that your urine is light yellow or clear like water. Choose water and other caffeine-free clear liquids until you feel better. If you have kidney, heart, or liver disease and have to limit fluids, talk with your doctor before you increase the amount of fluids you drink. · Begin eating small amounts of mild foods the next day, if you feel like it. ¨ Try yogurt that has live cultures of Lactobacillus. (Check the label.)  ¨ Avoid spicy foods, fruits, alcohol, and caffeine until 48 hours after all symptoms are gone. ¨ Avoid chewing gum that contains sorbitol. ¨ Avoid dairy products (except for yogurt with Lactobacillus) while you have diarrhea and for 3 days after symptoms are gone. · The doctor may recommend that you take over-the-counter medicine, such as loperamide (Imodium), if you still have diarrhea after 6 hours. Read and follow all instructions on the label. Do not use this medicine if you have bloody diarrhea, a high fever, or other signs of serious illness. Call your doctor if you think you are having a problem with your medicine. When should you call for help? Call 911 anytime you think you may need emergency care. For example, call if:  ? · You passed out (lost consciousness). ? · Your stools are maroon or very bloody. ?Call your doctor now or seek immediate medical care if:  ? · You are dizzy or lightheaded, or you feel like you may faint. ? · Your stools are black and look like tar, or they have streaks of blood. ? · You have new or worse belly pain. ? · You have symptoms of dehydration, such as:  ¨ Dry eyes and a dry mouth. ¨ Passing only a little dark urine. ¨ Feeling thirstier than usual.   ? · You have a new or higher fever. ? Watch closely for changes in your health, and be sure to contact your doctor if:  ? · Your diarrhea is getting worse. ? · You see pus in the diarrhea. ? · You are not getting better after 2 days (48 hours). Where can you learn more? Go to http://betzy-didi.info/. Enter N782 in the search box to learn more about \"Diarrhea: Care Instructions. \"  Current as of: March 20, 2017  Content Version: 11.4  © 0436-5851 [employer]. Care instructions adapted under license by [employer] (which disclaims liability or warranty for this information). If you have questions about a medical condition or this instruction, always ask your healthcare professional. [place] [address] any warranty or liability for your use of this information.

## 2018-03-16 ENCOUNTER — OFFICE VISIT (OUTPATIENT)
Dept: FAMILY MEDICINE CLINIC | Age: 58
End: 2018-03-16

## 2018-03-16 VITALS
RESPIRATION RATE: 17 BRPM | TEMPERATURE: 97.1 F | SYSTOLIC BLOOD PRESSURE: 123 MMHG | HEIGHT: 64 IN | WEIGHT: 163 LBS | DIASTOLIC BLOOD PRESSURE: 77 MMHG | BODY MASS INDEX: 27.83 KG/M2 | OXYGEN SATURATION: 97 % | HEART RATE: 91 BPM

## 2018-03-16 DIAGNOSIS — G89.29 CHRONIC ABDOMINAL PAIN: Primary | ICD-10-CM

## 2018-03-16 DIAGNOSIS — R10.9 CHRONIC ABDOMINAL PAIN: Primary | ICD-10-CM

## 2018-03-16 DIAGNOSIS — I10 ESSENTIAL HYPERTENSION: ICD-10-CM

## 2018-03-16 DIAGNOSIS — E11.65 TYPE 2 DIABETES MELLITUS WITH HYPERGLYCEMIA, WITHOUT LONG-TERM CURRENT USE OF INSULIN (HCC): ICD-10-CM

## 2018-03-16 DIAGNOSIS — K58.9 SPASM OF BOWEL: ICD-10-CM

## 2018-03-16 DIAGNOSIS — G47.01 INSOMNIA SECONDARY TO CHRONIC PAIN: ICD-10-CM

## 2018-03-16 DIAGNOSIS — G89.29 INSOMNIA SECONDARY TO CHRONIC PAIN: ICD-10-CM

## 2018-03-16 DIAGNOSIS — G44.229 CHRONIC TENSION-TYPE HEADACHE, NOT INTRACTABLE: ICD-10-CM

## 2018-03-16 NOTE — PROGRESS NOTES
Nupur Beltre is a 62 y.o female that is present in the office for a routine appointment for imaging results. 1. Have you been to the ER, urgent care clinic since your last visit? Hospitalized since your last visit? Yes When: [facility] 3/15/18    2. Have you seen or consulted any other health care providers outside of the [address] [place] [address] since your last visit? Include any pap smears or colon screening.  No     Health Maintenance Due   Topic Date Due    Pneumococcal 19-64 Medium Risk (1 of 1 - PPSV23) 02/04/1979    DTaP/Tdap/Td series (1 - Tdap) 02/04/1981    PAP AKA CERVICAL CYTOLOGY  02/04/1981    FOOT EXAM Q1  03/27/2018    MICROALBUMIN Q1  03/27/2018    LIPID PANEL Q1  03/27/2018

## 2018-03-16 NOTE — PROGRESS NOTES
Medardo Hutson is a 62 y.o.  female and presents with    Chief Complaint   Patient presents with    Headache     Subjective:  Ms. Germaine Cornejo presents today for f/u headaches. Current headache started on Friday and patient has been keeping the light off at home to reduce the pain. She has tried Aleeve, Advil, and Tylenol with no relief of symptoms. It has been increasingly difficult to sleep but Percocet's have improved the pain. She received the results of her MRI. She recently had a nosebleed on Saturday with increasing fatigue. There has been no injury to her neck. Pain improves when her  massages her neck. She presented to the ER yesterday c/o left lower quadrant pain, diarrhea and nausea. Patient states her symptoms began last Friday after eating a piece of pizza while at work. Patient was just seen 9 days ago at [facility] for similar complaints. She is scheduled for surgery later this month for scar tissue revision by Dr. Leon Bond and Dr. Ludmila Solano. She states her pain suddenly worsened today while trying to  a child at work. She rates her pain in her LLQ and groin 10/10. She has had some associated nausea but no vomiting. She has not taken anything for her symptoms. Patient Active Problem List   Diagnosis Code    Osteoarthritis of left knee M17.12    Hypertension I10    Hyperlipidemia E78.5    GERD (gastroesophageal reflux disease) K21.9    Asthma J45.909    Type 2 diabetes mellitus ([facility] E11.9    SBO (small bowel obstruction) K56.609    Chronic abdominal pain R10.9, G89.29    Post-operative pain G89.18    Chest pain R07.9    Essential hypertension, benign I10    Sural neuritis G57.80    Type 2 diabetes mellitus with nephropathy (HCC) E11.21    Non-intractable cyclical vomiting with nausea G43. A0     Patient Active Problem List    Diagnosis Date Noted    Non-intractable cyclical vomiting with nausea 01/24/2018    Type 2 diabetes mellitus with nephropathy ([facility] 01/19/2018    Chronic abdominal pain 03/14/2017    Post-operative pain 03/14/2017    SBO (small bowel obstruction) 02/21/2017    Osteoarthritis of left knee 06/27/2016    Hypertension 06/27/2016    Hyperlipidemia 06/27/2016    GERD (gastroesophageal reflux disease) 06/27/2016    Asthma 06/27/2016    Type 2 diabetes mellitus ([facility] 06/27/2016    Sural neuritis 06/23/2014    Chest pain 04/20/2014    Essential hypertension, benign 02/27/2012     Current Outpatient Prescriptions   Medication Sig Dispense Refill    oxyCODONE-acetaminophen (PERCOCET) 5-325 mg per tablet Take 1 Tab by mouth every six (6) hours as needed for Pain. Max Daily Amount: 4 Tabs. 12 Tab 0    amLODIPine (NORVASC) 2.5 mg tablet take 1 tablet by mouth NIGHTLY 90 Tab 1    dicyclomine (BENTYL) 10 mg capsule Take 10 mg by mouth 4 times daily (before meals and nightly).  ALLERGY RELIEF, CETIRIZINE, 10 mg tablet take 1 tablet by mouth once daily  0    doxepin (SINEQUAN) 10 mg capsule Take 1 Cap by mouth nightly. 30 Cap 11    ondansetron hcl (ZOFRAN, AS HYDROCHLORIDE,) 4 mg tablet Take 1 Tab by mouth every eight (8) hours as needed for Nausea. 12 Tab 0    metFORMIN (GLUMETZA) 500 mg TG24 24 hour tablet Take 500 mg by mouth two (2) times a day. 60 Tab 2    dapagliflozin (FARXIGA) 10 mg tab tablet Take 1 Tab by mouth daily. Indications: type 2 diabetes mellitus 90 Tab 3    omeprazole (PRILOSEC) 10 mg capsule Take 1 Cap by mouth daily. 30 Cap 11    albuterol (PROVENTIL HFA, VENTOLIN HFA, PROAIR HFA) 90 mcg/actuation inhaler Take 1 Puff by inhalation every six (6) hours as needed for Wheezing. 1 Inhaler 0    polyethylene glycol (MIRALAX) 17 gram/dose powder Take 17 g by mouth daily. 1 capful with 8 oz of water daily 119 g 0    atorvastatin (LIPITOR) 40 mg tablet Take 1 Tab by mouth nightly.  90 Tab 3    glucose blood VI test strips (ONETOUCH ULTRA TEST) strip Check blood glucose as directed 100 Strip 1    bisoprolol-hydrochlorothiazide (ZIAC) 2.5-6.25 mg per tablet Take 1 Tab by mouth daily. Allergies   Allergen Reactions    Macrodantin [Nitrofurantoin Macrocrystalline] Itching and Other (comments)    Tape [Adhesive] Other (comments)     Paper tape-- feels like burning skin  Burned skin when had wound vac     Past Medical History:   Diagnosis Date    Abdominal adhesions     Anemia     Arthritis     all over the body    Asthma     Cocaine abuse     Diabetes mellitus     Dyskinesia     bilateral    Essential hypertension     GERD (gastroesophageal reflux disease)     Hypertension     Menopause     Microhematuria     Stool color black      Past Surgical History:   Procedure Laterality Date    HX CHOLECYSTECTOMY  6/28/10    HX COLONOSCOPY      HX ENDOSCOPY      HX HERNIA REPAIR      HX HYSTERECTOMY      Fibroids    HX KNEE REPLACEMENT Left     HX OOPHORECTOMY  12/2000    HX ORTHOPAEDIC Right     ankle- multiple surgeries    HX SMALL BOWEL RESECTION  12/2000    HX SMALL BOWEL RESECTION  02/21/2017    Dr. Ryanne Chavez       Family History   Problem Relation Age of Onset    Hypertension Other      parent    Breast Cancer Other 21    Heart Disease Father     Hypertension Father     Diabetes Father     Diabetes Mother     Hypertension Other      sibling    Diabetes Other      parent    Diabetes Sister     Kidney Disease Brother     Diabetes Brother      Social History   Substance Use Topics    Smoking status: Never Smoker    Smokeless tobacco: Never Used    Alcohol use No       ROS   Constitutional: Negative for chills, fatigue and fever. HENT: Negative. Negative for sore throat. Eyes: Negative. Respiratory: Negative for cough and shortness of breath. Cardiovascular: Negative for chest pain and palpitations. Gastrointestinal: Positive for abdominal pain, diarrhea, nausea and vomiting. Genitourinary: Positive for frequency.  Negative for dysuria. Musculoskeletal: Negative. Skin: Negative. Neurological: Negative for dizziness, weakness, light-headedness and headaches. Psychiatric/Behavioral: Negative. All other systems reviewed and are negative. Objective:  Vitals:    03/16/18 1316   BP: 123/77   Pulse: 91   Resp: 17   Temp: 97.1 °F (36.2 °C)   TempSrc: Oral   SpO2: 97%   Weight: 163 lb (73.9 kg)   Height: 5' 4\" (1.626 m)   PainSc:   8   PainLoc: Head     Constitutional: She is oriented to person, place, and time. She appears well-developed and well-nourished. No distress. HENT:   Head: Normocephalic and atraumatic. Mouth/Throat: Oropharynx is clear and moist.   Eyes: Conjunctivae are normal. No scleral icterus. Neck: Neck supple. No JVD present. No tracheal deviation present. Cardiovascular: Normal rate, regular rhythm and normal heart sounds. Pulmonary/Chest: Effort normal and breath sounds normal. No respiratory distress. She has no wheezes. Abdominal: Soft. Normal appearance and bowel sounds are normal. She exhibits no distension and no mass. There is tenderness in the right lower quadrant and left lower quadrant. There is no rebound, no guarding and no CVA tenderness. Midline well healed scar in center of abdomen   Musculoskeletal: Normal range of motion. Neurological: She is alert and oriented to person, place, and time. She has normal strength. Gait normal. GCS eye subscore is 4. GCS verbal subscore is 5. GCS motor subscore is 6. TESTS  KUB normal    Assessment/Plan:    1. Chronic abdominal pain  Restart doxepin; f/u with surgery and gynecology as scheduled    2. Chronic tension-type headache, not intractable  Doxepin at bedtime; heat and massage    3. Type 2 diabetes mellitus with hyperglycemia, without long-term current use of insulin (HCC)  Goal hgb a1c <7; at goal    4. Essential hypertension  Goal <130/80; at goal.    5. Insomnia secondary to chronic pain  Continue doxepin    6.  Spasm of bowel  Take doxepin as prescribed      Lab review: labs reviewed, I note that hemogram normal, basic metabolic panel abnormal with chronic renal insufficiency, urinalysis abnormal blood and glucose, lipase normal      I have discussed the diagnosis with the patient and the intended plan as seen in the above orders. The patient has received an after-visit summary and questions were answered concerning future plans. I have discussed medication side effects and warnings with the patient as well. I have reviewed the plan of care with the patient, accepted their input and they are in agreement with the treatment goals. Follow-up Disposition:  Return if symptoms worsen or fail to improve.

## 2018-03-16 NOTE — MR AVS SNAPSHOT
[facility] 
 
 
 [address] [place] [address] [facility] [address] [address] 
[phone] Patient: Medardo Hutson MRN: [id number] IZO:[id number] Visit Information Date & Time Provider Department Dept. Phone Encounter #  
 3/16/2018  1:00 PM [phone] [id number] Follow-up Instructions Return if symptoms worsen or fail to improve. Your Appointments 4/19/2018  3:30 PM  
ROUTINE CARE with Jayson Levine MD  
[address] [address] [facility] App[facility] Note: Return in about 3 months (around 4/19/2018), or if symptoms worsen or fail to improve, for diabetes. [id number] [place] [address] [facility] [address] [place]  
  
   
 [address] [place] [address] [facility] Upcoming Health Maintenance Date Due Pneumococcal 19-64 Medium Risk (1 of 1 - PPSV23) 2/4/1979 DTaP/Tdap/Td series (1 - Tdap) 2/4/1981 PAP AKA CERVICAL CYTOLOGY 2/4/1981 FOOT EXAM Q1 3/27/2018 MICROALBUMIN Q1 3/27/2018 LIPID PANEL Q1 3/27/2018 EYE EXAM RETINAL OR DILATED Q1 5/29/2018 HEMOGLOBIN A1C Q6M 7/19/2018 BREAST CANCER SCRN MAMMOGRAM 11/24/2019 COLONOSCOPY 3/3/2026 Allergies as of 3/16/2018  Review Complete On: 3/16/2018 By: Jayson Levine MD  
  
 Severity Noted Reaction Type Reactions Macrodantin [Nitrofurantoin Macrocrystalline]  01/31/2012    Itching, Other (comments) Tape [Adhesive]  09/30/2014    Other (comments) Paper tape-- feels like burning skin Burned skin when had wound vac Current Immunizations  Reviewed on 2/22/2017 Name Date Influenza Vaccine 11/15/2016 Not reviewed this visit You Were Diagnosed With   
  
 Codes Comments Chronic abdominal pain    -  Primary ICD-10-CM: R10.9, G89.29 ICD-9-CM: 789.00, 338.29 Chronic tension-type headache, not intractable     ICD-10-CM: C43.872 ICD-9-CM: 339.12   
 Type 2 diabetes mellitus with hyperglycemia, without long-term current use of insulin (HCC)     ICD-10-CM: E11.65 ICD-9-CM: 250.00, 790.29 Essential hypertension     ICD-10-CM: I10 
ICD-9-CM: 401.9 Insomnia secondary to chronic pain     ICD-10-CM: G89.29, G47.01 
ICD-9-CM: 338.29, 327.01 Spasm of bowel     ICD-10-CM: K58.9 ICD-9-CM: 564.9 Vitals BP Pulse Temp Resp Height(growth percentile) Weight(growth percentile) 123/77 (BP 1 Location: Right arm, BP Patient Position: Sitting) 91 97.1 °F (36.2 °C) (Oral) 17 5' 4\" (1.626 m) 163 lb (73.9 kg) SpO2 BMI OB Status Smoking Status 97% 27.98 kg/m2 Hysterectomy Never Smoker BMI and BSA Data Body Mass Index Body Surface Area  
 27.98 kg/m 2 1.83 m 2 Preferred Pharmacy Pharmacy Name Phone [facility] [facility] [phone] Your Updated Medication List  
  
   
This list is accurate as of 3/16/18  1:35 PM.  Always use your most recent med list.  
  
  
  
  
 albuterol 90 mcg/actuation inhaler Commonly known as:  PROVENTIL HFA, VENTOLIN HFA, PROAIR HFA Take 1 Puff by inhalation every six (6) hours as needed for Wheezing. ALLERGY RELIEF (CETIRIZINE) 10 mg tablet Generic drug:  cetirizine  
take 1 tablet by mouth once daily  
  
 amLODIPine 2.5 mg tablet Commonly known as:  NORVASC  
take 1 tablet by mouth NIGHTLY  
  
 atorvastatin 40 mg tablet Commonly known as:  LIPITOR Take 1 Tab by mouth nightly. BENTYL 10 mg capsule Generic drug:  dicyclomine Take 10 mg by mouth 4 times daily (before meals and nightly). dapagliflozin 10 mg Tab tablet Commonly known as:  [employer] Take 1 Tab by mouth daily. Indications: type 2 diabetes mellitus  
  
 doxepin 10 mg capsule Commonly known as:  SINEquan Take 1 Cap by mouth nightly. glucose blood VI test strips strip Commonly known as:  ONETOUCH ULTRA TEST  
 Check blood glucose as directed  
  
 metFORMIN 500 mg Tg24 24 hour tablet Commonly known as:  [place] [employer] Take 500 mg by mouth two (2) times a day. omeprazole 10 mg capsule Commonly known as:  PRILOSEC Take 1 Cap by mouth daily. ondansetron hcl 4 mg tablet Commonly known as:  ZOFRAN (AS HYDROCHLORIDE) Take 1 Tab by mouth every eight (8) hours as needed for Nausea. oxyCODONE-acetaminophen 5-325 mg per tablet Commonly known as:  PERCOCET Take 1 Tab by mouth every six (6) hours as needed for Pain. Max Daily Amount: 4 Tabs. polyethylene glycol 17 gram/dose powder Commonly known as:  Shelli Vladimir Take 17 g by mouth daily. 1 capful with 8 oz of water daily ZIAC 2.5-6.25 mg per tablet Generic drug:  bisoprolol-hydroCHLOROthiazide Take 1 Tab by mouth daily. Follow-up Instructions Return if symptoms worsen or fail to improve. Introducing [facility]! [facility] introduces Log[employer] patient portal. Now you can access parts of your medical record, email your doctor's office, and request medication refills online. 1. In your internet browser, go to [email or web address] 2. Click on the First Time User? Click Here link in the Sign In box. You will see the New Member Sign Up page. 3. Enter your Log[employer]ware Access Code exactly as it appears below. You will not need to use this code after youve completed the sign-up process. If you do not sign up before the expiration date, you must request a new code. · Log[employer] Access Code: [email or web address] Expires: 5/22/2018  5:38 PM 
 
4. Enter the last four digits of your Social Security Number (xxxx) and Date of Birth (mm/dd/yyyy) as indicated and click Submit. You will be taken to the next sign-up page. 5. Create a Logicalware ID. This will be your Logicalware login ID and cannot be changed, so think of one that is secure and easy to remember. 6. Create a [employer] password. You can change your password at any time. 7. Enter your Password Reset Question and Answer. This can be used at a later time if you forget your password. 8. Enter your e-mail address. You will receive e-mail notification when new information is available in [address] 9. Click Sign Up. You can now view and download portions of your medical record. 10. Click the Download Summary menu link to download a portable copy of your medical information. If you have questions, please visit the Frequently Asked Questions section of the [employer] website. Remember, [employer] is NOT to be used for urgent needs. For medical emergencies, dial 911. Now available from your iPhone and Android! Please provide this summary of care documentation to your next provider. Your primary care clinician is listed as Addison Ayon. If you have any questions after today's visit, please call [phone].

## 2018-03-19 RX ORDER — METFORMIN HYDROCHLORIDE 500 MG/1
TABLET, FILM COATED, EXTENDED RELEASE ORAL
Qty: 60 TAB | Refills: 2 | Status: SHIPPED | OUTPATIENT
Start: 2018-03-19 | End: 2018-08-02 | Stop reason: SDUPTHER

## 2018-03-19 RX ORDER — KETOROLAC TROMETHAMINE 30 MG/ML
30 INJECTION, SOLUTION INTRAMUSCULAR; INTRAVENOUS ONCE
Qty: 1 VIAL | Refills: 0
Start: 2018-03-19 | End: 2018-03-19

## 2018-03-30 ENCOUNTER — OFFICE VISIT (OUTPATIENT)
Dept: FAMILY MEDICINE CLINIC | Age: 58
End: 2018-03-30

## 2018-03-30 VITALS
TEMPERATURE: 97.5 F | RESPIRATION RATE: 18 BRPM | SYSTOLIC BLOOD PRESSURE: 138 MMHG | DIASTOLIC BLOOD PRESSURE: 92 MMHG | HEIGHT: 64 IN | HEART RATE: 98 BPM

## 2018-03-30 DIAGNOSIS — M75.51 SUBACROMIAL BURSITIS OF RIGHT SHOULDER JOINT: Primary | ICD-10-CM

## 2018-03-30 DIAGNOSIS — E11.65 TYPE 2 DIABETES MELLITUS WITH HYPERGLYCEMIA, WITHOUT LONG-TERM CURRENT USE OF INSULIN (HCC): ICD-10-CM

## 2018-03-30 DIAGNOSIS — I10 ESSENTIAL HYPERTENSION: ICD-10-CM

## 2018-03-30 DIAGNOSIS — R10.9 CHRONIC ABDOMINAL PAIN: ICD-10-CM

## 2018-03-30 DIAGNOSIS — G89.29 CHRONIC ABDOMINAL PAIN: ICD-10-CM

## 2018-03-30 RX ORDER — TRIAMCINOLONE ACETONIDE 40 MG/ML
40 INJECTION, SUSPENSION INTRA-ARTICULAR; INTRAMUSCULAR ONCE
Qty: 1 ML | Refills: 0
Start: 2018-03-30 | End: 2018-03-30

## 2018-03-30 NOTE — PROGRESS NOTES
Chintan Duong is a 62 y.o. female  Chief Complaint   Patient presents with    Medication Evaluation     1. Have you been to the ER, urgent care clinic since your last visit? Hospitalized since your last visit? No    2. Have you seen or consulted any other health care providers outside of the [address] [place] [address] since your last visit? Include any pap smears or colon screening.  No

## 2018-03-30 NOTE — PATIENT INSTRUCTIONS
Joint Injections: Care Instructions  Your Care Instructions  Joint injections are shots into a joint, such as the knee. They may be used to put in medicines, such as pain relievers. Or they can be used to take out fluid. Sometimes the fluid is tested in a lab. This can help find the cause of a joint problem. A corticosteroid, or steroid, shot is used to reduce inflammation in tendons or joints. It is often used to treat problems such as arthritis, tendinitis, and bursitis. Steroids can be injected directly into a painful, inflamed joint. They can also help reduce inflammation of a bursa. A bursa is a sac of fluid. It cushions and lubricates areas where tendons, ligaments, skin, muscles, or bones rub against each other. A steroid shot can sometimes help with short-term pain relief when other treatments haven't worked. If steroid shots help, pain may improve for weeks or months. Follow-up care is a key part of your treatment and safety. Be sure to make and go to all appointments, and call your doctor if you are having problems. It's also a good idea to know your test results and keep a list of the medicines you take. How can you care for yourself at home? · Put ice or a cold pack on the area for 10 to 20 minutes at a time. Put a thin cloth between the ice and your skin. · Take anti-inflammatory medicines to reduce pain, swelling, or inflammation. These include ibuprofen (Advil, Motrin) and naproxen (Aleve). Read and follow all instructions on the label. · Avoid strenuous activities for several days, especially those that put stress on the area where you got the shot. · If you have dressings over the area, keep them clean and dry. You may remove them when your doctor tells you to. When should you call for help? Call your doctor now or seek immediate medical care if:  ? · You have signs of infection, such as:  ¨ Increased pain, swelling, warmth, or redness. ¨ Red streaks leading from the site.   ¨ Pus draining from the site. ¨ A fever. ? Watch closely for changes in your health, and be sure to contact your doctor if you have any problems. Where can you learn more? Go to http://betzy-didi.info/. Enter N616 in the search box to learn more about \"Joint Injections: Care Instructions. \"  Current as of: March 21, 2017  Content Version: 11.4  © 6862-9344 [employer]. Care instructions adapted under license by [employer] (which disclaims liability or warranty for this information). If you have questions about a medical condition or this instruction, always ask your healthcare professional. [address] any warranty or liability for your use of this information.

## 2018-03-30 NOTE — PROGRESS NOTES
Dillon Nunez is a 62 y.o.  female and presents with    Chief Complaint   Patient presents with    Medication Evaluation       Subjective:  Shoulder Pain  Patient presents after evaluation yesterday in ortho with Dr. Ashkan Lindsay. There was concern or right shoulder weakness with difficulty lifting the shoulder. She complains of right side shoulder pain. The symptoms began problem is longstanding Course of symptoms since onset has been gradually worsening. Pain is described as worse at night. Symptoms were incited by no known event. Patient denies hematemesis, melena, fever. Therapy to date includes home exercises: not very effective. Pt was evaluated in the ER for abdominal pain and diarrhea 3/15/2018;  Patient was just seen on 3/6/18 at [facility] for similar complaints. She is scheduled for surgery for scar tissue revision by Dr. Kathrin Couch and Dr. Adina Dawson. She states her pain suddenly worsened today while trying to  a child at work. She rates her pain in her LLQ and groin 10/10. She has had some associated nausea but no vomiting. She has not taken anything for her symptoms. She denied all other complaints.     Abdominal Pain    Associated symptoms include diarrhea, nausea, vomiting and frequency. Pertinent negatives include no fever, no dysuria, no headaches and no chest pain. Urinary Frequency    Associated symptoms include nausea, vomiting, frequency and abdominal pain. Pertinent negatives include no chills.    Patient Active Problem List   Diagnosis Code    Osteoarthritis of left knee M17.12    Hypertension I10    Hyperlipidemia E78.5    GERD (gastroesophageal reflux disease) K21.9    Asthma J45.909    Type 2 diabetes mellitus ([facility] E11.9    SBO (small bowel obstruction) ([facility]) K56.609    Chronic abdominal pain R10.9, G89.29    Post-operative pain G89.18    Chest pain R07.9    Essential hypertension, benign I10    Sural neuritis G57.80    Type 2 diabetes mellitus with nephropathy ([facility]) E11.21    Non-intractable cyclical vomiting with nausea G43. A0     Patient Active Problem List    Diagnosis Date Noted    Non-intractable cyclical vomiting with nausea 01/24/2018    Type 2 diabetes mellitus with nephropathy ([facility] 01/19/2018    Chronic abdominal pain 03/14/2017    Post-operative pain 03/14/2017    SBO (small bowel obstruction) ([facility]) 02/21/2017    Osteoarthritis of left knee 06/27/2016    Hypertension 06/27/2016    Hyperlipidemia 06/27/2016    GERD (gastroesophageal reflux disease) 06/27/2016    Asthma 06/27/2016    Type 2 diabetes mellitus ([facility] 06/27/2016    Sural neuritis 06/23/2014    Chest pain 04/20/2014    Essential hypertension, benign 02/27/2012     Current Outpatient Prescriptions   Medication Sig Dispense Refill    metFORMIN (GLUMETZA ER) 500 mg TG24 24 hour tablet take 1 tablet by mouth twice a day 60 Tab 2    oxyCODONE-acetaminophen (PERCOCET) 5-325 mg per tablet Take 1 Tab by mouth every six (6) hours as needed for Pain. Max Daily Amount: 4 Tabs. 12 Tab 0    amLODIPine (NORVASC) 2.5 mg tablet take 1 tablet by mouth NIGHTLY 90 Tab 1    dicyclomine (BENTYL) 10 mg capsule Take 10 mg by mouth 4 times daily (before meals and nightly).  ALLERGY RELIEF, CETIRIZINE, 10 mg tablet take 1 tablet by mouth once daily  0    doxepin (SINEQUAN) 10 mg capsule Take 1 Cap by mouth nightly. 30 Cap 11    ondansetron hcl (ZOFRAN, AS HYDROCHLORIDE,) 4 mg tablet Take 1 Tab by mouth every eight (8) hours as needed for Nausea. 12 Tab 0    dapagliflozin (FARXIGA) 10 mg tab tablet Take 1 Tab by mouth daily. Indications: type 2 diabetes mellitus 90 Tab 3    omeprazole (PRILOSEC) 10 mg capsule Take 1 Cap by mouth daily. 30 Cap 11    albuterol (PROVENTIL HFA, VENTOLIN HFA, PROAIR HFA) 90 mcg/actuation inhaler Take 1 Puff by inhalation every six (6) hours as needed for Wheezing.  1 Inhaler 0    polyethylene glycol (MIRALAX) 17 gram/dose powder Take 17 g by mouth daily. 1 capful with 8 oz of water daily 119 g 0    atorvastatin (LIPITOR) 40 mg tablet Take 1 Tab by mouth nightly. 90 Tab 3    glucose blood VI test strips (ONETOUCH ULTRA TEST) strip Check blood glucose as directed 100 Strip 1    bisoprolol-hydrochlorothiazide (ZIAC) 2.5-6.25 mg per tablet Take 1 Tab by mouth daily. Allergies   Allergen Reactions    Macrodantin [Nitrofurantoin Macrocrystalline] Itching and Other (comments)    Tape [Adhesive] Other (comments)     Paper tape-- feels like burning skin  Burned skin when had wound vac     Past Medical History:   Diagnosis Date    Abdominal adhesions     Anemia     Arthritis     all over the body    Asthma     Cocaine abuse     Diabetes mellitus     Dyskinesia     bilateral    Essential hypertension     GERD (gastroesophageal reflux disease)     Hypertension     Menopause     Microhematuria     Stool color black      Past Surgical History:   Procedure Laterality Date    HX CHOLECYSTECTOMY  6/28/10    HX COLONOSCOPY      HX ENDOSCOPY      HX HERNIA REPAIR      HX HYSTERECTOMY      Fibroids    HX KNEE REPLACEMENT Left     HX OOPHORECTOMY  12/2000    HX ORTHOPAEDIC Right     ankle- multiple surgeries    HX SMALL BOWEL RESECTION  12/2000    HX SMALL BOWEL RESECTION  02/21/2017    Dr. Josh Tang       Family History   Problem Relation Age of Onset    Hypertension Other      parent    Breast Cancer Other 21    Heart Disease Father     Hypertension Father     Diabetes Father     Diabetes Mother     Hypertension Other      sibling    Diabetes Other      parent    Diabetes Sister     Kidney Disease Brother     Diabetes Brother      Social History   Substance Use Topics    Smoking status: Never Smoker    Smokeless tobacco: Never Used    Alcohol use No       ROS   Constitutional: Negative for chills, fatigue and fever. HENT: Negative. Negative for sore throat. Eyes: Negative.     Respiratory: Negative for cough and shortness of breath. Cardiovascular: Negative for chest pain and palpitations. Gastrointestinal: Positive for abdominal pain, diarrhea, nausea and vomiting. Genitourinary: Positive for frequency. Negative for dysuria. Musculoskeletal: Negative. Skin: Negative. Neurological: Negative for dizziness, weakness, light-headedness and headaches. Psychiatric/Behavioral: Negative. All other systems reviewed and are negative. Objective:  Vitals:    03/30/18 1427   BP: (!) 138/92   Pulse: 98   Resp: 18   Temp: 97.5 °F (36.4 °C)   Height: 5' 4\" (1.626 m)   PainSc:   0 - No pain     Constitutional: She is oriented to person, place, and time. She appears well-developed and well-nourished. No distress. HENT:   Head: Normocephalic and atraumatic. Mouth/Throat: Oropharynx is clear and moist.   Eyes: Conjunctivae are normal. No scleral icterus. Neck: Neck supple. No JVD present. No tracheal deviation present. Cardiovascular: Normal rate, regular rhythm and normal heart sounds. Pulmonary/Chest: Effort normal and breath sounds normal. No respiratory distress. She has no wheezes. Abdominal: Soft. Normal appearance and bowel sounds are normal. She exhibits no distension and no mass. There is tenderness in the right lower quadrant and left lower quadrant. There is no rebound, no guarding and no CVA tenderness. Midline well healed scar in center of abdomen   Musculoskeletal: Shoulder exam:  Right shoulder: No erythema, edema, or bruising. Nontender to acromioclavicular joint. Tender to subacromial bursa. POS impingment signs No glenohumeral laxity. Left shoulder: No erythema, edema, or bruising. Nontender to acromioclavicular joint. Nontender to subacromial bursa. No impingement signs. No glenohumeral laxity. Neurological: She is alert and oriented to person, place, and time. She has normal strength. Gait normal. GCS eye subscore is 4. GCS verbal subscore is 5.  GCS motor subscore is 6. Skin: Skin is warm and dry. She is not diaphoretic. Psychiatric: She has a normal mood and affect. [facility]  OFFICE PROCEDURE PROGRESS NOTE        Chart reviewed for the following:   Yuliana Guaman MD, have reviewed the History, Physical and updated the Allergic reactions for [address] performed immediately prior to start of procedure:   I, Levy Abad MD, have performed the following reviews on Ena Alamo prior to the start of the procedure:            * Patient was identified by name and date of birth   * Agreement on procedure being performed was verified  * Risks and Benefits explained to the patient  * Procedure site verified and marked as necessary  * Patient was positioned for comfort  * Understanding confirmed and consent was signed and verified     Time: .2:59 PM       Date of procedure: 3/30/2018    Procedure performed by:  Levy Abad MD    Provider assisted by: Jessica Brown LPN    Patient assisted by: self    How tolerated by patient: tolerated the procedure well with no complications    Comments: none    after obtaining informed consent the Right shoulder was prepped in sterile fashion; ethyl chloride used for topical anesthesia; 3 cc 1:1:1 marcaine 0.5%, lidocaine 1% without epi and kenalog 40 mg/cc injected into shoulder joint using posterior approach; EBL < 1 cc; post procedure pain 6/10    Assessment/Plan:    1. Subacromial bursitis of right shoulder joint  corticosteriod injection with immediate improvement in range of motion; exercises demonstrated    2. Type 2 diabetes mellitus with hyperglycemia, without long-term current use of insulin (HCC)  Continue current medication; goal hgb a1c <7    3. Essential hypertension  Goal <130/80; borderline controlled    4.  Chronic abdominal pain  F/u surgery       Lab review: labs reviewed, I note that electrolytes normal, renal functions mildly abnormal but acceptable, basic metabolic panel mildly abnormal but acceptable, urinalysis abnormal glucose      I have discussed the diagnosis with the patient and the intended plan as seen in the above orders. The patient has received an after-visit summary and questions were answered concerning future plans. I have discussed medication side effects and warnings with the patient as well. I have reviewed the plan of care with the patient, accepted their input and they are in agreement with the treatment goals. Follow-up Disposition:  Return if symptoms worsen or fail to improve.

## 2018-03-30 NOTE — MR AVS SNAPSHOT
[facility] 
 
 
 [address] [facility] [place] [facility] [place] [address] [facility] [address] [id number] 
[phone] Patient: Helga Nevarez MRN: [id number] MRV:[birth year] Visit Information Date & Time Provider Department Dept. Phone Encounter #  
 3/30/2018  2:45 PM Heena Irwin Roseannmarciano Waldemar [phone] [id number] Follow-up Instructions Return if symptoms worsen or fail to improve. Your Appointments 4/19/2018  3:30 PM  
ROUTINE CARE with Heena Irwin MD  
[address] [address]) Appt Note: Return in about 3 months (around 4/19/2018), or if symptoms worsen or fail to improve, for diabetes. [id number] [facility] [place] [facility] [place] [address] [place] [address] [address] [facility]  
  
   
 [address] [facility] [place] [facility] [place] [address] [facility] Upcoming Health Maintenance Date Due Pneumococcal 19-64 Medium Risk (1 of 1 - PPSV23) 2/4/1979 DTaP/Tdap/Td series (1 - Tdap) 2/4/1981 PAP AKA CERVICAL CYTOLOGY 2/4/1981 FOOT EXAM Q1 3/27/2018 MICROALBUMIN Q1 3/27/2018 LIPID PANEL Q1 3/27/2018 EYE EXAM RETINAL OR DILATED Q1 5/29/2018 HEMOGLOBIN A1C Q6M 7/19/2018 BREAST CANCER SCRN MAMMOGRAM 11/24/2019 COLONOSCOPY 3/3/2026 Allergies as of 3/30/2018  Review Complete On: 3/30/2018 By: Heena Irwin MD  
  
 Severity Noted Reaction Type Reactions Macrodantin [Nitrofurantoin Macrocrystalline]  01/31/2012    Itching, Other (comments) Tape [Adhesive]  09/30/2014    Other (comments) Paper tape-- feels like burning skin Burned skin when had wound vac Current Immunizations  Reviewed on 2/22/2017 Name Date Influenza Vaccine 11/15/2016 Not reviewed this visit You Were Diagnosed With   
  
 Codes Comments Subacromial bursitis of right shoulder joint    -  Primary ICD-10-CM: M75.51 
ICD-9-CM: 726.19  Type 2 diabetes mellitus with hyperglycemia, without long-term current use of insulin (HCC)     ICD-10-CM: E11.65 
 ICD-9-CM: 250.00, 790.29 Essential hypertension     ICD-10-CM: I10 
ICD-9-CM: 401.9 Vitals BP Pulse Temp Resp Height(growth percentile) OB Status (!) 138/92 98 97.5 °F (36.4 °C) 18 5' 4\" (1.626 m) Hysterectomy Smoking Status Never Smoker Vitals History Preferred Pharmacy Pharmacy Name Phone [facility] [address]  [facility] [phone] Your Updated Medication List  
  
   
This list is accurate as of 3/30/18  3:01 PM.  Always use your most recent med list.  
  
  
  
  
 albuterol 90 mcg/actuation inhaler Commonly known as:  PROVENTIL HFA, VENTOLIN HFA, PROAIR HFA Take 1 Puff by inhalation every six (6) hours as needed for Wheezing. ALLERGY RELIEF (CETIRIZINE) 10 mg tablet Generic drug:  cetirizine  
take 1 tablet by mouth once daily  
  
 amLODIPine 2.5 mg tablet Commonly known as:  NORVASC  
take 1 tablet by mouth NIGHTLY  
  
 atorvastatin 40 mg tablet Commonly known as:  LIPITOR Take 1 Tab by mouth nightly. BENTYL 10 mg capsule Generic drug:  dicyclomine Take 10 mg by mouth 4 times daily (before meals and nightly). dapagliflozin 10 mg Tab tablet Commonly known as:  [employer] Take 1 Tab by mouth daily. Indications: type 2 diabetes mellitus  
  
 doxepin 10 mg capsule Commonly known as:  SINEquan Take 1 Cap by mouth nightly. glucose blood VI test strips strip Commonly known as:  ONETOUCH ULTRA TEST Check blood glucose as directed  
  
 metFORMIN 500 mg Tg24 24 hour tablet Commonly known as:  GLUMETZA ER  
take 1 tablet by mouth twice a day  
  
 omeprazole 10 mg capsule Commonly known as:  PRILOSEC Take 1 Cap by mouth daily. ondansetron hcl 4 mg tablet Commonly known as:  ZOFRAN (AS HYDROCHLORIDE) Take 1 Tab by mouth every eight (8) hours as needed for Nausea. oxyCODONE-acetaminophen 5-325 mg per tablet Commonly known as:  PERCOCET  
 Take 1 Tab by mouth every six (6) hours as needed for Pain. Max Daily Amount: 4 Tabs. polyethylene glycol 17 gram/dose powder Commonly known as:  Phuong Noval Take 17 g by mouth daily. 1 capful with 8 oz of water daily  
  
 triamcinolone acetonide 40 mg/mL injection Commonly known as:  KENALOG  
1 mL by IntraBURSal route once for 1 dose. ZIAC 2.5-6.25 mg per tablet Generic drug:  bisoprolol-hydroCHLOROthiazide Take 1 Tab by mouth daily. We Performed the Following DRAIN/INJECT LARGE JOINT/BURSA [id number] CPT(R)] TRIAMCINOLONE ACETONIDE INJ [ [facility]] Follow-up Instructions Return if symptoms worsen or fail to improve. Patient Instructions Joint Injections: Care Instructions Your Care Instructions Joint injections are shots into a joint, such as the knee. They may be used to put in medicines, such as pain relievers. Or they can be used to take out fluid. Sometimes the fluid is tested in a lab. This can help find the cause of a joint problem. A corticosteroid, or steroid, shot is used to reduce inflammation in tendons or joints. It is often used to treat problems such as arthritis, tendinitis, and bursitis. Steroids can be injected directly into a painful, inflamed joint. They can also help reduce inflammation of a bursa. A bursa is a sac of fluid. It cushions and lubricates areas where tendons, ligaments, skin, muscles, or bones rub against each other. A steroid shot can sometimes help with short-term pain relief when other treatments haven't worked. If steroid shots help, pain may improve for weeks or months. Follow-up care is a key part of your treatment and safety. Be sure to make and go to all appointments, and call your doctor if you are having problems. It's also a good idea to know your test results and keep a list of the medicines you take. How can you care for yourself at home? · Put ice or a cold pack on the area for 10 to 20 minutes at a time. Put a thin cloth between the ice and your skin. · Take anti-inflammatory medicines to reduce pain, swelling, or inflammation. These include ibuprofen (Advil, Motrin) and naproxen (Aleve). Read and follow all instructions on the label. · Avoid strenuous activities for several days, especially those that put stress on the area where you got the shot. · If you have dressings over the area, keep them clean and dry. You may remove them when your doctor tells you to. When should you call for help? Call your doctor now or seek immediate medical care if: 
? · You have signs of infection, such as: 
¨ Increased pain, swelling, warmth, or redness. ¨ Red streaks leading from the site. ¨ Pus draining from the site. ¨ A fever. ? Watch closely for changes in your health, and be sure to contact your doctor if you have any problems. Where can you learn more? Go to http://betzy-didi.info/. Enter N616 in the search box to learn more about \"Joint Injections: Care Instructions. \" Current as of: March 21, 2017 Content Version: 11.4 © 8546-6771 [employer]. Care instructions adapted under license by [employer] (which disclaims liability or warranty for this information). If you have questions about a medical condition or this instruction, always ask your healthcare professional. [place] [address] any warranty or liability for your use of this information. Introducing [facility]! Lelia Dodson introduces Influitive patient portal. Now you can access parts of your medical record, email your doctor's office, and request medication refills online. 1. In your internet browser, go to [email or web address] 2. Click on the First Time User? Click Here link in the Sign In box. You will see the New Member Sign Up page. 3. Enter your [employer] Access Code exactly as it appears below. You will not need to use this code after youve completed the sign-up process. If you do not sign up before the expiration date, you must request a new code. · [employer] Access Code: [email or web address] Expires: 5/22/2018  5:38 PM 
 
4. Enter the last four digits of your Social Security Number (xxxx) and Date of Birth (mm/dd/yyyy) as indicated and click Submit. You will be taken to the next sign-up page. 5. Create a Whyteboardt ID. This will be your [employer] login ID and cannot be changed, so think of one that is secure and easy to remember. 6. Create a [employer] password. You can change your password at any time. 7. Enter your Password Reset Question and Answer. This can be used at a later time if you forget your password. 8. Enter your e-mail address. You will receive e-mail notification when new information is available in [address]. 9. Click Sign Up. You can now view and download portions of your medical record. 10. Click the Download Summary menu link to download a portable copy of your medical information. If you have questions, please visit the Frequently Asked Questions section of the [employer] website. Remember, [employer] is NOT to be used for urgent needs. For medical emergencies, dial 911. Now available from your iPhone and Android! Please provide this summary of care documentation to your next provider. Your primary care clinician is listed as Felipa Nelson. If you have any questions after today's visit, please call [phone].

## 2018-04-11 ENCOUNTER — TELEPHONE (OUTPATIENT)
Dept: FAMILY MEDICINE CLINIC | Age: 58
End: 2018-04-11

## 2018-04-11 NOTE — TELEPHONE ENCOUNTER
Patient stated would like to get a medication for her headaches as discussed on her last visit if her condition did not get better. No further information given when asked.

## 2018-04-18 ENCOUNTER — APPOINTMENT (OUTPATIENT)
Dept: CT IMAGING | Age: 58
End: 2018-04-18
Attending: PHYSICIAN ASSISTANT
Payer: COMMERCIAL

## 2018-04-18 ENCOUNTER — HOSPITAL ENCOUNTER (EMERGENCY)
Age: 58
Discharge: HOME | End: 2018-04-18
Attending: EMERGENCY MEDICINE
Payer: COMMERCIAL

## 2018-04-18 VITALS
HEART RATE: 80 BPM | WEIGHT: 160 LBS | HEIGHT: 64 IN | BODY MASS INDEX: 27.31 KG/M2 | RESPIRATION RATE: 18 BRPM | OXYGEN SATURATION: 98 % | DIASTOLIC BLOOD PRESSURE: 77 MMHG | TEMPERATURE: 98.6 F | SYSTOLIC BLOOD PRESSURE: 127 MMHG

## 2018-04-18 DIAGNOSIS — R19.5 LOOSE STOOLS: ICD-10-CM

## 2018-04-18 DIAGNOSIS — R11.0 NAUSEA WITHOUT VOMITING: ICD-10-CM

## 2018-04-18 DIAGNOSIS — R10.9 RIGHT SIDED ABDOMINAL PAIN: Primary | ICD-10-CM

## 2018-04-18 DIAGNOSIS — E86.0 DEHYDRATION: ICD-10-CM

## 2018-04-18 LAB
ALBUMIN SERPL-MCNC: 3.2 G/DL (ref 3.4–5)
ALBUMIN/GLOB SERPL: 0.8 {RATIO} (ref 0.8–1.7)
ALP SERPL-CCNC: 133 U/L (ref 45–117)
ALT SERPL-CCNC: 21 U/L (ref 13–56)
ANION GAP SERPL CALC-SCNC: 9 MMOL/L (ref 3–18)
APPEARANCE UR: CLEAR
AST SERPL-CCNC: 10 U/L (ref 15–37)
BACTERIA URNS QL MICRO: NEGATIVE /HPF
BASOPHILS # BLD: 0 K/UL (ref 0–0.06)
BASOPHILS NFR BLD: 0 % (ref 0–2)
BILIRUB SERPL-MCNC: 0.2 MG/DL (ref 0.2–1)
BILIRUB UR QL: NEGATIVE
BUN SERPL-MCNC: 15 MG/DL (ref 7–18)
BUN/CREAT SERPL: 11 (ref 12–20)
CALCIUM SERPL-MCNC: 9.6 MG/DL (ref 8.5–10.1)
CHLORIDE SERPL-SCNC: 105 MMOL/L (ref 100–108)
CO2 SERPL-SCNC: 27 MMOL/L (ref 21–32)
COLOR UR: YELLOW
CREAT SERPL-MCNC: 1.33 MG/DL (ref 0.6–1.3)
DIFFERENTIAL METHOD BLD: NORMAL
EOSINOPHIL # BLD: 0.1 K/UL (ref 0–0.4)
EOSINOPHIL NFR BLD: 1 % (ref 0–5)
EPITH CASTS URNS QL MICRO: NORMAL /LPF (ref 0–5)
ERYTHROCYTE [DISTWIDTH] IN BLOOD BY AUTOMATED COUNT: 13.9 % (ref 11.6–14.5)
GLOBULIN SER CALC-MCNC: 4.2 G/DL (ref 2–4)
GLUCOSE SERPL-MCNC: 150 MG/DL (ref 74–99)
GLUCOSE UR STRIP.AUTO-MCNC: >1000 MG/DL
HCT VFR BLD AUTO: 41.9 % (ref 35–45)
HGB BLD-MCNC: 13.9 G/DL (ref 12–16)
HGB UR QL STRIP: ABNORMAL
KETONES UR QL STRIP.AUTO: NEGATIVE MG/DL
LEUKOCYTE ESTERASE UR QL STRIP.AUTO: NEGATIVE
LIPASE SERPL-CCNC: 301 U/L (ref 73–393)
LYMPHOCYTES # BLD: 2.1 K/UL (ref 0.9–3.6)
LYMPHOCYTES NFR BLD: 26 % (ref 21–52)
MCH RBC QN AUTO: 29.3 PG (ref 24–34)
MCHC RBC AUTO-ENTMCNC: 33.2 G/DL (ref 31–37)
MCV RBC AUTO: 88.2 FL (ref 74–97)
MONOCYTES # BLD: 0.5 K/UL (ref 0.05–1.2)
MONOCYTES NFR BLD: 6 % (ref 3–10)
NEUTS SEG # BLD: 5.3 K/UL (ref 1.8–8)
NEUTS SEG NFR BLD: 67 % (ref 40–73)
NITRITE UR QL STRIP.AUTO: NEGATIVE
PH UR STRIP: 8 [PH] (ref 5–8)
PLATELET # BLD AUTO: 269 K/UL (ref 135–420)
PMV BLD AUTO: 10.7 FL (ref 9.2–11.8)
POTASSIUM SERPL-SCNC: 3.7 MMOL/L (ref 3.5–5.5)
PROT SERPL-MCNC: 7.4 G/DL (ref 6.4–8.2)
PROT UR STRIP-MCNC: NEGATIVE MG/DL
RBC # BLD AUTO: 4.75 M/UL (ref 4.2–5.3)
RBC #/AREA URNS HPF: NORMAL /HPF (ref 0–5)
SODIUM SERPL-SCNC: 141 MMOL/L (ref 136–145)
SP GR UR REFRACTOMETRY: 1.02 (ref 1–1.03)
UROBILINOGEN UR QL STRIP.AUTO: 0.2 EU/DL (ref 0.2–1)
WBC # BLD AUTO: 8 K/UL (ref 4.6–13.2)
WBC URNS QL MICRO: NORMAL /HPF (ref 0–4)

## 2018-04-18 PROCEDURE — 96375 TX/PRO/DX INJ NEW DRUG ADDON: CPT

## 2018-04-18 PROCEDURE — 74011636320 HC RX REV CODE- 636/320: Performed by: EMERGENCY MEDICINE

## 2018-04-18 PROCEDURE — 74177 CT ABD & PELVIS W/CONTRAST: CPT

## 2018-04-18 PROCEDURE — 83690 ASSAY OF LIPASE: CPT | Performed by: PHYSICIAN ASSISTANT

## 2018-04-18 PROCEDURE — 85025 COMPLETE CBC W/AUTO DIFF WBC: CPT | Performed by: PHYSICIAN ASSISTANT

## 2018-04-18 PROCEDURE — 74011250636 HC RX REV CODE- 250/636: Performed by: PHYSICIAN ASSISTANT

## 2018-04-18 PROCEDURE — 96361 HYDRATE IV INFUSION ADD-ON: CPT

## 2018-04-18 PROCEDURE — 87086 URINE CULTURE/COLONY COUNT: CPT | Performed by: PHYSICIAN ASSISTANT

## 2018-04-18 PROCEDURE — 96372 THER/PROPH/DIAG INJ SC/IM: CPT

## 2018-04-18 PROCEDURE — 81001 URINALYSIS AUTO W/SCOPE: CPT | Performed by: PHYSICIAN ASSISTANT

## 2018-04-18 PROCEDURE — 96374 THER/PROPH/DIAG INJ IV PUSH: CPT

## 2018-04-18 PROCEDURE — 80053 COMPREHEN METABOLIC PANEL: CPT | Performed by: PHYSICIAN ASSISTANT

## 2018-04-18 PROCEDURE — 99284 EMERGENCY DEPT VISIT MOD MDM: CPT

## 2018-04-18 RX ORDER — ONDANSETRON 4 MG/1
4 TABLET, ORALLY DISINTEGRATING ORAL
Qty: 12 TAB | Refills: 0 | Status: SHIPPED | OUTPATIENT
Start: 2018-04-18 | End: 2018-07-29

## 2018-04-18 RX ORDER — DICYCLOMINE HYDROCHLORIDE 10 MG/ML
20 INJECTION INTRAMUSCULAR
Status: COMPLETED | OUTPATIENT
Start: 2018-04-18 | End: 2018-04-18

## 2018-04-18 RX ORDER — MORPHINE SULFATE 10 MG/ML
2 INJECTION, SOLUTION INTRAMUSCULAR; INTRAVENOUS
Status: COMPLETED | OUTPATIENT
Start: 2018-04-18 | End: 2018-04-18

## 2018-04-18 RX ORDER — ONDANSETRON 2 MG/ML
4 INJECTION INTRAMUSCULAR; INTRAVENOUS
Status: COMPLETED | OUTPATIENT
Start: 2018-04-18 | End: 2018-04-18

## 2018-04-18 RX ORDER — SODIUM CHLORIDE 9 MG/ML
1000 INJECTION, SOLUTION INTRAVENOUS ONCE
Status: COMPLETED | OUTPATIENT
Start: 2018-04-18 | End: 2018-04-18

## 2018-04-18 RX ADMIN — IOPAMIDOL 90 ML: 612 INJECTION, SOLUTION INTRAVENOUS at 21:01

## 2018-04-18 RX ADMIN — DICYCLOMINE HYDROCHLORIDE 20 MG: 10 INJECTION INTRAMUSCULAR at 20:21

## 2018-04-18 RX ADMIN — MORPHINE SULFATE 2 MG: 10 INJECTION INTRAMUSCULAR; INTRAVENOUS; SUBCUTANEOUS at 21:14

## 2018-04-18 RX ADMIN — ONDANSETRON HYDROCHLORIDE 4 MG: 2 INJECTION INTRAMUSCULAR; INTRAVENOUS at 20:21

## 2018-04-18 RX ADMIN — SODIUM CHLORIDE 1000 ML: 900 INJECTION, SOLUTION INTRAVENOUS at 20:20

## 2018-04-18 NOTE — ED PROVIDER NOTES
HPI Comments: 62 yr old female, hx asthma, DM, htn, and GERD presents to the ED with complaints of RLQ abd pain, nausea, urinary frequency, and loose stool since last night. Pt denies fever but reports chills and states \"the pain is so bad it makes me sweat. \" Pt denies blood in the stool, SOB, vomiting, and chest pain. Pt is a known diabetic but denies missing any doses of her daily meds. She reports taking bentyl with little relief in sx. No other complaints at this time. Patient is a 62 y.o. female presenting with abdominal pain. Abdominal Pain    Associated symptoms include diarrhea, nausea and frequency. Pertinent negatives include no fever, no vomiting, no constipation, no dysuria, no hematuria, no headaches, no myalgias, no chest pain and no back pain.         Past Medical History:   Diagnosis Date    Abdominal adhesions     Anemia     Arthritis     all over the body    Asthma     Cocaine abuse     Diabetes mellitus     Dyskinesia     bilateral    Essential hypertension     GERD (gastroesophageal reflux disease)     Hypertension     Menopause     Microhematuria     Stool color black        Past Surgical History:   Procedure Laterality Date    HX CHOLECYSTECTOMY  6/28/10    HX COLONOSCOPY      HX ENDOSCOPY      HX HERNIA REPAIR      HX HYSTERECTOMY      Fibroids    HX KNEE REPLACEMENT Left     HX OOPHORECTOMY  12/2000    HX ORTHOPAEDIC Right     ankle- multiple surgeries    HX SMALL BOWEL RESECTION  12/2000    HX SMALL BOWEL RESECTION  02/21/2017    Dr. Nikki Nur           Family History:   Problem Relation Age of Onset    Hypertension Other      parent    Breast Cancer Other 21    Heart Disease Father     Hypertension Father     Diabetes Father     Diabetes Mother     Hypertension Other      sibling    Diabetes Other      parent    Diabetes Sister     Kidney Disease Brother     Diabetes Brother        Social History     Social History    Marital status:      Spouse name: N/A    Number of children: N/A    Years of education: N/A     Occupational History    Not on file. Social History Main Topics    Smoking status: Never Smoker    Smokeless tobacco: Never Used    Alcohol use No    Drug use: No    Sexual activity: Yes     Partners: Male     Birth control/ protection: None     Other Topics Concern    Not on file     Social History Narrative         ALLERGIES: Macrodantin [nitrofurantoin macrocrystalline] and Tape [adhesive]    Review of Systems   Constitutional: Positive for chills and diaphoresis. Negative for fatigue and fever. HENT: Negative. Negative for congestion, ear pain, rhinorrhea and sore throat. Eyes: Negative. Negative for pain and redness. Respiratory: Negative. Negative for cough, shortness of breath, wheezing and stridor. Cardiovascular: Negative. Negative for chest pain and leg swelling. Gastrointestinal: Positive for abdominal pain, diarrhea and nausea. Negative for constipation and vomiting. Endocrine: Negative. Genitourinary: Positive for frequency. Negative for dysuria, flank pain and hematuria. Musculoskeletal: Negative. Negative for back pain, myalgias, neck pain and neck stiffness. Skin: Negative. Negative for rash and wound. Allergic/Immunologic: Negative. Neurological: Negative. Negative for dizziness, seizures, syncope and headaches. Hematological: Negative. Psychiatric/Behavioral: Negative. All other systems reviewed and are negative. Vitals:    04/18/18 1940   BP: (!) 151/102   Pulse: 80   Resp: 18   Temp: 98.6 °F (37 °C)   SpO2: 100%   Weight: 72.6 kg (160 lb)   Height: 5' 4\" (1.626 m)            Physical Exam   Constitutional: She is oriented to person, place, and time. She appears well-developed and well-nourished. She appears distressed. Pt is not diaphoretic in the exam room, but is tearful and notably distressed.     HENT:   Head: Normocephalic and atraumatic. Eyes: Conjunctivae are normal. Right eye exhibits no discharge. Left eye exhibits no discharge. No scleral icterus. Neck: Normal range of motion. Neck supple. Cardiovascular: Normal rate, regular rhythm and normal heart sounds. Exam reveals no gallop and no friction rub. No murmur heard. Pulmonary/Chest: Effort normal and breath sounds normal. No stridor. No respiratory distress. She has no wheezes. She has no rales. Abdominal: Soft. Bowel sounds are normal. She exhibits no distension and no mass. There is tenderness. There is guarding.   + R CVA TTP  RUQ and RLQ TTP with guarding noted. Musculoskeletal: Normal range of motion. Neurological: She is alert and oriented to person, place, and time. Skin: Skin is warm and dry. No rash noted. She is not diaphoretic. No erythema. Psychiatric: She has a normal mood and affect. Her behavior is normal. Thought content normal.   Nursing note and vitals reviewed. MDM  Number of Diagnoses or Management Options  Nausea without vomiting:   Right sided abdominal pain:   Diagnosis management comments: Impression:  abd pain R side, nausea without vomiting, urinary frequency, loose stool, dehydration     IV inserted 1 L saline, 4 mg zofran, and 20 mg bentyl given     CBC unremarkable, glucose 150, SGOT 10, ALB 3.2, Cr 1.33, BUCR 11, GFRAA 50, lipase unremarkable, , GLOB 4.2   UA: > 1000 glucose, trace blood, urine sent for culture     Progress pt sx improving after bentyl and zofran, still awaiting CT. 8:39 PM     Progress:pt complaining of abd pain again, 2 mg morphine given. CT results still pending. 9:26 PM     Progress: pt sx improved with morphine  CT: no acute CT  Findings in the abd or pelvis, appendix normal, stable 4 cm L adnexal cyst, small hiatal hernia, colonic diverticulosis, post surgical changes LLQ small bowel resection and anastomosis, hysterectomy, cholecystectomy. Discussed these results with the pt.      Patient is stable for discharge at this time. Rx for zofran given. Rest and follow-up with PCP this week. Return to the ED immediately for any new or worsening sx.   Rebekah Bryant PA-C 10:33 PM          Amount and/or Complexity of Data Reviewed  Clinical lab tests: ordered and reviewed  Tests in the radiology section of CPT®: ordered and reviewed    Risk of Complications, Morbidity, and/or Mortality  Presenting problems: moderate  Diagnostic procedures: moderate  Management options: moderate    Patient Progress  Patient progress: improved        ED Course       Procedures

## 2018-04-19 NOTE — DISCHARGE INSTRUCTIONS
Abdominal Pain: Care Instructions  Your Care Instructions    Abdominal pain has many possible causes. Some aren't serious and get better on their own in a few days. Others need more testing and treatment. If your pain continues or gets worse, you need to be rechecked and may need more tests to find out what is wrong. You may need surgery to correct the problem. Don't ignore new symptoms, such as fever, nausea and vomiting, urination problems, pain that gets worse, and dizziness. These may be signs of a more serious problem. Your doctor may have recommended a follow-up visit in the next 8 to 12 hours. If you are not getting better, you may need more tests or treatment. The doctor has checked you carefully, but problems can develop later. If you notice any problems or new symptoms, get medical treatment right away. Follow-up care is a key part of your treatment and safety. Be sure to make and go to all appointments, and call your doctor if you are having problems. It's also a good idea to know your test results and keep a list of the medicines you take. How can you care for yourself at home? · Rest until you feel better. · To prevent dehydration, drink plenty of fluids, enough so that your urine is light yellow or clear like water. Choose water and other caffeine-free clear liquids until you feel better. If you have kidney, heart, or liver disease and have to limit fluids, talk with your doctor before you increase the amount of fluids you drink. · If your stomach is upset, eat mild foods, such as rice, dry toast or crackers, bananas, and applesauce. Try eating several small meals instead of two or three large ones. · Wait until 48 hours after all symptoms have gone away before you have spicy foods, alcohol, and drinks that contain caffeine. · Do not eat foods that are high in fat. · Avoid anti-inflammatory medicines such as aspirin, ibuprofen (Advil, Motrin), and naproxen (Aleve).  These can cause stomach upset. Talk to your doctor if you take daily aspirin for another health problem. When should you call for help? Call 911 anytime you think you may need emergency care. For example, call if:  ? · You passed out (lost consciousness). ? · You pass maroon or very bloody stools. ? · You vomit blood or what looks like coffee grounds. ? · You have new, severe belly pain. ?Call your doctor now or seek immediate medical care if:  ? · Your pain gets worse, especially if it becomes focused in one area of your belly. ? · You have a new or higher fever. ? · Your stools are black and look like tar, or they have streaks of blood. ? · You have unexpected vaginal bleeding. ? · You have symptoms of a urinary tract infection. These may include:  ¨ Pain when you urinate. ¨ Urinating more often than usual.  ¨ Blood in your urine. ? · You are dizzy or lightheaded, or you feel like you may faint. ? Watch closely for changes in your health, and be sure to contact your doctor if:  ? · You are not getting better after 1 day (24 hours). Where can you learn more? Go to http://betzy-didi.info/. Enter E664 in the search box to learn more about \"Abdominal Pain: Care Instructions. \"  Current as of: March 20, 2017  Content Version: 11.4  © 6175-1144 [email or web address]. Care instructions adapted under license by [employer] (which disclaims liability or warranty for this information). If you have questions about a medical condition or this instruction, always ask your healthcare professional. [place] [address] any warranty or liability for your use of this information. Nausea and Vomiting: Care Instructions  Your Care Instructions    When you are nauseated, you may feel weak and sweaty and notice a lot of saliva in your mouth. Nausea often leads to vomiting.  Most of the time you do not need to worry about nausea and vomiting, but they can be signs of other illnesses. Two common causes of nausea and vomiting are stomach flu and food poisoning. Nausea and vomiting from viral stomach flu will usually start to improve within 24 hours. Nausea and vomiting from food poisoning may last from 12 to 48 hours. The doctor has checked you carefully, but problems can develop later. If you notice any problems or new symptoms, get medical treatment right away. Follow-up care is a key part of your treatment and safety. Be sure to make and go to all appointments, and call your doctor if you are having problems. It's also a good idea to know your test results and keep a list of the medicines you take. How can you care for yourself at home? · To prevent dehydration, drink plenty of fluids, enough so that your urine is light yellow or clear like water. Choose water and other caffeine-free clear liquids until you feel better. If you have kidney, heart, or liver disease and have to limit fluids, talk with your doctor before you increase the amount of fluids you drink. · Rest in bed until you feel better. · When you are able to eat, try clear soups, mild foods, and liquids until all symptoms are gone for 12 to 48 hours. Other good choices include dry toast, crackers, cooked cereal, and gelatin dessert, such as Jell-O. When should you call for help? Call 911 anytime you think you may need emergency care. For example, call if:  ? · You passed out (lost consciousness). ?Call your doctor now or seek immediate medical care if:  ? · You have symptoms of dehydration, such as:  ¨ Dry eyes and a dry mouth. ¨ Passing only a little dark urine. ¨ Feeling thirstier than usual.   ? · You have new or worsening belly pain. ? · You have a new or higher fever. ? · You vomit blood or what looks like coffee grounds. ? Watch closely for changes in your health, and be sure to contact your doctor if:  ? · You have ongoing nausea and vomiting. ? · Your vomiting is getting worse.    ? · Your vomiting lasts longer than 2 days. ? · You are not getting better as expected. Where can you learn more? Go to http://betzy-didi.info/. Enter 25 [id number] in the search box to learn more about \"Nausea and Vomiting: Care Instructions. \"  Current as of: 2017  Content Version: 11.4  © 1670-2191 [employer]. Care instructions adapted under license by [employer] (which disclaims liability or warranty for this information). If you have questions about a medical condition or this instruction, always ask your healthcare professional. [address] any warranty or liability for your use of this information. [employer] Activation    Thank you for requesting access to [employer]. Please follow the instructions below to securely access and download your online medical record. [employer] allows you to send messages to your doctor, view your test results, renew your prescriptions, schedule appointments, and more. How Do I Sign Up? 1. In your internet browser, go to [email or web address]  2. Click on the First Time User? Click Here link in the Sign In box. You will be redirect to the New Member Sign Up page. 3. Enter your [employer] Access Code exactly as it appears below. You will not need to use this code after youve completed the sign-up process. If you do not sign up before the expiration date, you must request a new code. [employer] Access Code: [email or web address]  Expires: 2018  5:38 PM (This is the date your [employer] access code will )    4. Enter the last four digits of your Social Security Number (xxxx) and Date of Birth (mm/dd/yyyy) as indicated and click Submit. You will be taken to the next sign-up page. 5. Create a [employer] ID. This will be your [employer] login ID and cannot be changed, so think of one that is secure and easy to remember. 6. Create a [employer] password. You can change your password at any time.   7. Enter your Password Reset Question and Answer. This can be used at a later time if you forget your password. 8. Enter your e-mail address. You will receive e-mail notification when new information is available in [address] 9. Click Sign Up. You can now view and download portions of your medical record. 10. Click the Download Summary menu link to download a portable copy of your medical information. Additional Information    If you have questions, please visit the Frequently Asked Questions section of the [employer] website at [email or web address] Remember, [employer] is NOT to be used for urgent needs. For medical emergencies, dial 911.

## 2018-04-19 NOTE — ED NOTES
I have reviewed discharge instruction and prescriptions with patient. Patient verbalized understanding and has no further questions at this time. Education taught and patient verbalized understanding of education. Teach back method used. Right ac IV removed, catheter tip intact on removal.  Armband removed and shredded per patients request.    Patients pain 3/10. Belongings given to patient. Patient discharged with waiting room  to wait for spouse.

## 2018-04-20 LAB
BACTERIA SPEC CULT: NORMAL
SERVICE CMNT-IMP: NORMAL

## 2018-04-26 ENCOUNTER — HOSPITAL ENCOUNTER (EMERGENCY)
Age: 58
Discharge: HOME | End: 2018-04-26
Attending: EMERGENCY MEDICINE
Payer: COMMERCIAL

## 2018-04-26 ENCOUNTER — APPOINTMENT (OUTPATIENT)
Dept: CT IMAGING | Age: 58
End: 2018-04-26
Attending: EMERGENCY MEDICINE
Payer: COMMERCIAL

## 2018-04-26 VITALS
RESPIRATION RATE: 13 BRPM | DIASTOLIC BLOOD PRESSURE: 74 MMHG | OXYGEN SATURATION: 100 % | TEMPERATURE: 98.7 F | HEART RATE: 74 BPM | SYSTOLIC BLOOD PRESSURE: 117 MMHG

## 2018-04-26 DIAGNOSIS — K59.00 CONSTIPATION, UNSPECIFIED CONSTIPATION TYPE: Primary | ICD-10-CM

## 2018-04-26 DIAGNOSIS — R79.89 ELEVATED SERUM CREATININE: ICD-10-CM

## 2018-04-26 DIAGNOSIS — S16.1XXA STRAIN OF CERVICAL PORTION OF RIGHT TRAPEZIUS MUSCLE: ICD-10-CM

## 2018-04-26 DIAGNOSIS — R10.31 RLQ ABDOMINAL PAIN: ICD-10-CM

## 2018-04-26 LAB
ALBUMIN SERPL-MCNC: 3.5 G/DL (ref 3.4–5)
ALBUMIN/GLOB SERPL: 0.9 {RATIO} (ref 0.8–1.7)
ALP SERPL-CCNC: 146 U/L (ref 45–117)
ALT SERPL-CCNC: 28 U/L (ref 13–56)
ANION GAP SERPL CALC-SCNC: 10 MMOL/L (ref 3–18)
APPEARANCE UR: CLEAR
AST SERPL-CCNC: 16 U/L (ref 15–37)
BACTERIA URNS QL MICRO: NEGATIVE /HPF
BASOPHILS # BLD: 0 K/UL (ref 0–0.06)
BASOPHILS NFR BLD: 0 % (ref 0–2)
BILIRUB SERPL-MCNC: 0.3 MG/DL (ref 0.2–1)
BILIRUB UR QL: NEGATIVE
BUN SERPL-MCNC: 23 MG/DL (ref 7–18)
BUN/CREAT SERPL: 16 (ref 12–20)
CALCIUM SERPL-MCNC: 9.2 MG/DL (ref 8.5–10.1)
CHLORIDE SERPL-SCNC: 102 MMOL/L (ref 100–108)
CO2 SERPL-SCNC: 26 MMOL/L (ref 21–32)
COLOR UR: YELLOW
CREAT SERPL-MCNC: 1.47 MG/DL (ref 0.6–1.3)
DIFFERENTIAL METHOD BLD: NORMAL
EOSINOPHIL # BLD: 0.1 K/UL (ref 0–0.4)
EOSINOPHIL NFR BLD: 1 % (ref 0–5)
EPITH CASTS URNS QL MICRO: NORMAL /LPF (ref 0–5)
ERYTHROCYTE [DISTWIDTH] IN BLOOD BY AUTOMATED COUNT: 14.2 % (ref 11.6–14.5)
GLOBULIN SER CALC-MCNC: 4 G/DL (ref 2–4)
GLUCOSE SERPL-MCNC: 172 MG/DL (ref 74–99)
GLUCOSE UR STRIP.AUTO-MCNC: >1000 MG/DL
HCT VFR BLD AUTO: 43.5 % (ref 35–45)
HGB BLD-MCNC: 14.4 G/DL (ref 12–16)
HGB UR QL STRIP: ABNORMAL
KETONES UR QL STRIP.AUTO: NEGATIVE MG/DL
LACTATE BLD-SCNC: 0.5 MMOL/L (ref 0.4–2)
LEUKOCYTE ESTERASE UR QL STRIP.AUTO: NEGATIVE
LIPASE SERPL-CCNC: 289 U/L (ref 73–393)
LYMPHOCYTES # BLD: 2.1 K/UL (ref 0.9–3.6)
LYMPHOCYTES NFR BLD: 26 % (ref 21–52)
MCH RBC QN AUTO: 29.4 PG (ref 24–34)
MCHC RBC AUTO-ENTMCNC: 33.1 G/DL (ref 31–37)
MCV RBC AUTO: 88.8 FL (ref 74–97)
MONOCYTES # BLD: 0.7 K/UL (ref 0.05–1.2)
MONOCYTES NFR BLD: 8 % (ref 3–10)
NEUTS SEG # BLD: 5.3 K/UL (ref 1.8–8)
NEUTS SEG NFR BLD: 65 % (ref 40–73)
NITRITE UR QL STRIP.AUTO: NEGATIVE
PH UR STRIP: 5 [PH] (ref 5–8)
PLATELET # BLD AUTO: 247 K/UL (ref 135–420)
PMV BLD AUTO: 10.7 FL (ref 9.2–11.8)
POTASSIUM SERPL-SCNC: 4.5 MMOL/L (ref 3.5–5.5)
PROT SERPL-MCNC: 7.5 G/DL (ref 6.4–8.2)
PROT UR STRIP-MCNC: NEGATIVE MG/DL
RBC # BLD AUTO: 4.9 M/UL (ref 4.2–5.3)
RBC #/AREA URNS HPF: NORMAL /HPF (ref 0–5)
SODIUM SERPL-SCNC: 138 MMOL/L (ref 136–145)
SP GR UR REFRACTOMETRY: 1.02 (ref 1–1.03)
UROBILINOGEN UR QL STRIP.AUTO: 0.2 EU/DL (ref 0.2–1)
WBC # BLD AUTO: 8.3 K/UL (ref 4.6–13.2)
WBC URNS QL MICRO: NORMAL /HPF (ref 0–4)

## 2018-04-26 PROCEDURE — 74011636320 HC RX REV CODE- 636/320: Performed by: EMERGENCY MEDICINE

## 2018-04-26 PROCEDURE — 96361 HYDRATE IV INFUSION ADD-ON: CPT

## 2018-04-26 PROCEDURE — 96374 THER/PROPH/DIAG INJ IV PUSH: CPT

## 2018-04-26 PROCEDURE — 96375 TX/PRO/DX INJ NEW DRUG ADDON: CPT

## 2018-04-26 PROCEDURE — 81001 URINALYSIS AUTO W/SCOPE: CPT | Performed by: EMERGENCY MEDICINE

## 2018-04-26 PROCEDURE — 94762 N-INVAS EAR/PLS OXIMTRY CONT: CPT

## 2018-04-26 PROCEDURE — 83690 ASSAY OF LIPASE: CPT | Performed by: EMERGENCY MEDICINE

## 2018-04-26 PROCEDURE — 74011250636 HC RX REV CODE- 250/636: Performed by: EMERGENCY MEDICINE

## 2018-04-26 PROCEDURE — 85025 COMPLETE CBC W/AUTO DIFF WBC: CPT | Performed by: EMERGENCY MEDICINE

## 2018-04-26 PROCEDURE — 83605 ASSAY OF LACTIC ACID: CPT

## 2018-04-26 PROCEDURE — 99285 EMERGENCY DEPT VISIT HI MDM: CPT

## 2018-04-26 PROCEDURE — 80053 COMPREHEN METABOLIC PANEL: CPT | Performed by: EMERGENCY MEDICINE

## 2018-04-26 PROCEDURE — 74177 CT ABD & PELVIS W/CONTRAST: CPT

## 2018-04-26 RX ORDER — POLYETHYLENE GLYCOL 3350 17 G/17G
17 POWDER, FOR SOLUTION ORAL 2 TIMES DAILY
Qty: 119 G | Refills: 0 | Status: SHIPPED | OUTPATIENT
Start: 2018-04-26 | End: 2018-12-26

## 2018-04-26 RX ORDER — PROCHLORPERAZINE EDISYLATE 5 MG/ML
10 INJECTION INTRAMUSCULAR; INTRAVENOUS
Status: DISCONTINUED | OUTPATIENT
Start: 2018-04-26 | End: 2018-04-26 | Stop reason: HOSPADM

## 2018-04-26 RX ORDER — SENNOSIDES 8.6 MG/1
1 TABLET ORAL DAILY
Qty: 30 TAB | Refills: 0 | Status: SHIPPED | OUTPATIENT
Start: 2018-04-26 | End: 2018-12-26

## 2018-04-26 RX ORDER — FENTANYL CITRATE 50 UG/ML
75 INJECTION, SOLUTION INTRAMUSCULAR; INTRAVENOUS
Status: DISCONTINUED | OUTPATIENT
Start: 2018-04-26 | End: 2018-04-26

## 2018-04-26 RX ORDER — FENTANYL CITRATE 50 UG/ML
50 INJECTION, SOLUTION INTRAMUSCULAR; INTRAVENOUS
Status: COMPLETED | OUTPATIENT
Start: 2018-04-26 | End: 2018-04-26

## 2018-04-26 RX ORDER — DIPHENHYDRAMINE HYDROCHLORIDE 50 MG/ML
25 INJECTION, SOLUTION INTRAMUSCULAR; INTRAVENOUS ONCE
Status: COMPLETED | OUTPATIENT
Start: 2018-04-26 | End: 2018-04-26

## 2018-04-26 RX ADMIN — FENTANYL CITRATE 50 MCG: 50 INJECTION, SOLUTION INTRAMUSCULAR; INTRAVENOUS at 07:39

## 2018-04-26 RX ADMIN — SODIUM CHLORIDE 1000 ML: 900 INJECTION, SOLUTION INTRAVENOUS at 07:40

## 2018-04-26 RX ADMIN — PROCHLORPERAZINE EDISYLATE 10 MG: 5 INJECTION INTRAMUSCULAR; INTRAVENOUS at 07:39

## 2018-04-26 RX ADMIN — DIPHENHYDRAMINE HYDROCHLORIDE 25 MG: 50 INJECTION, SOLUTION INTRAMUSCULAR; INTRAVENOUS at 07:39

## 2018-04-26 RX ADMIN — IOPAMIDOL 80 ML: 612 INJECTION, SOLUTION INTRAVENOUS at 07:05

## 2018-04-26 RX ADMIN — SODIUM CHLORIDE 1000 ML: 900 INJECTION, SOLUTION INTRAVENOUS at 06:06

## 2018-04-26 NOTE — ED NOTES
Oncoming nurse made aware that patient needs lactic acid and meds that were missed due to patient being away to CT directly after restroom use.

## 2018-04-26 NOTE — ED PROVIDER NOTES
EMERGENCY DEPARTMENT HISTORY AND PHYSICAL EXAM    6:57 AM      Date: 4/26/2018  Patient Name: Ilene Carlson    History of Presenting Illness     Chief Complaint   Patient presents with    Headache    Neck Pain    Abdominal Pain         History Provided By: Patient    Chief Complaint: Headache, neck pain, abdominal pain   Duration: 1 Days, yesterday  Timing:  Acute  Location: \"Right side of body\"  Quality: Aching  Severity: Severe  Modifying Factors: Nothing improves or worsens  Associated Symptoms: Nausea      Additional History (Context): Ilene Carlson is a 62 y.o. female with hx of anemia, HTN, DM, arthritis, and microhematuria who presents with complaints of severe R sided neck pain with acute onset yesterday with associated HA and Right sided abd pain. Patient notes that she was at work when the pain began. 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 Patient also reports RLQ abdominal pain that is extremely severe. She notes history of bowel surgery for blockage. Denies any further complaints or symptoms at the moment. PCP: Rodrigo Waddell MD    Current Facility-Administered Medications   Medication Dose Route Frequency Provider Last Rate Last Dose    prochlorperazine (COMPAZINE) injection 10 mg  10 mg IntraVENous Q6H PRN Arslan Barksdale DO   10 mg at 04/26/18 4505     Current Outpatient Prescriptions   Medication Sig Dispense Refill    polyethylene glycol (MIRALAX) 17 gram/dose powder Take 17 g by mouth two (2) times a day. 1 capful with 8 oz of water daily 119 g 0    senna (SENOKOT) 8.6 mg tablet Take 1 Tab by mouth daily. 30 Tab 0    ondansetron (ZOFRAN ODT) 4 mg disintegrating tablet Take 1 Tab by mouth every eight (8) hours as needed for Nausea.  12 Tab 0    metFORMIN (GLUMETZA ER) 500 mg TG24 24 hour tablet take 1 tablet by mouth twice a day 60 Tab 2    oxyCODONE-acetaminophen (PERCOCET) 5-325 mg per tablet Take 1 Tab by mouth every six (6) hours as needed for Pain. Max Daily Amount: 4 Tabs. 12 Tab 0    amLODIPine (NORVASC) 2.5 mg tablet take 1 tablet by mouth NIGHTLY 90 Tab 1    dicyclomine (BENTYL) 10 mg capsule Take 10 mg by mouth 4 times daily (before meals and nightly).  ALLERGY RELIEF, CETIRIZINE, 10 mg tablet take 1 tablet by mouth once daily  0    doxepin (SINEQUAN) 10 mg capsule Take 1 Cap by mouth nightly. 30 Cap 11    dapagliflozin (FARXIGA) 10 mg tab tablet Take 1 Tab by mouth daily. Indications: type 2 diabetes mellitus 90 Tab 3    omeprazole (PRILOSEC) 10 mg capsule Take 1 Cap by mouth daily. 30 Cap 11    albuterol (PROVENTIL HFA, VENTOLIN HFA, PROAIR HFA) 90 mcg/actuation inhaler Take 1 Puff by inhalation every six (6) hours as needed for Wheezing. 1 Inhaler 0    atorvastatin (LIPITOR) 40 mg tablet Take 1 Tab by mouth nightly. 90 Tab 3    glucose blood VI test strips (ONETOUCH ULTRA TEST) strip Check blood glucose as directed 100 Strip 1    bisoprolol-hydrochlorothiazide (ZIAC) 2.5-6.25 mg per tablet Take 1 Tab by mouth daily.          Past History     Past Medical History:  Past Medical History:   Diagnosis Date    Abdominal adhesions     Anemia     Arthritis     all over the body    Asthma     Cocaine abuse     Diabetes mellitus     Dyskinesia     bilateral    Essential hypertension     GERD (gastroesophageal reflux disease)     Hypertension     Menopause     Microhematuria     Stool color black        Past Surgical History:  Past Surgical History:   Procedure Laterality Date    HX CHOLECYSTECTOMY  6/28/10    HX COLONOSCOPY      HX ENDOSCOPY      HX HERNIA REPAIR      HX HYSTERECTOMY      Fibroids    HX KNEE REPLACEMENT Left     HX OOPHORECTOMY  12/2000    HX ORTHOPAEDIC Right     ankle- multiple surgeries    HX SMALL BOWEL RESECTION  12/2000    HX SMALL BOWEL RESECTION  02/21/2017    Dr. Danielle Oneal         Family History:  Family History   Problem Relation Age of Onset    Hypertension Other      parent    Breast Cancer Other 21    Heart Disease Father     Hypertension Father     Diabetes Father     Diabetes Mother     Hypertension Other      sibling    Diabetes Other      parent    Diabetes Sister     Kidney Disease Brother     Diabetes Brother        Social History:  Social History   Substance Use Topics    Smoking status: Never Smoker    Smokeless tobacco: Never Used    Alcohol use No       Allergies: Allergies   Allergen Reactions    Macrodantin [Nitrofurantoin Macrocrystalline] Itching and Other (comments)    Tape [Adhesive] Other (comments)     Paper tape-- feels like burning skin  Burned skin when had wound vac         Review of Systems       Review of Systems   Constitutional: Negative for chills and fever. HENT: Negative for ear pain and sore throat. Eyes: Negative for pain and visual disturbance. Respiratory: Negative for cough and shortness of breath. Cardiovascular: Negative for chest pain and palpitations. Gastrointestinal: Positive for abdominal pain (RLQ) and nausea. Negative for diarrhea and vomiting. Genitourinary: Negative for flank pain. Musculoskeletal: Positive for neck pain. Negative for back pain. Neurological: Positive for headaches. Negative for syncope. Psychiatric/Behavioral: Negative for agitation. The patient is not nervous/anxious. Physical Exam     Visit Vitals    /74    Pulse 74    Temp 98.7 °F (37.1 °C)    Resp 13    SpO2 100%       Physical Exam   Constitutional: She is oriented to person, place, and time. She appears well-developed and well-nourished. She appears distressed. HENT:   Head: Normocephalic and atraumatic. Mouth/Throat: Oropharynx is clear and moist.   Eyes: Pupils are equal, round, and reactive to light. No scleral icterus. Neck: Neck supple. No tracheal deviation present.    Right trapezius cervical paraspinal tenderness upon palpation   Cardiovascular: Regular rhythm. No murmur heard. Pulmonary/Chest: Effort normal and breath sounds normal. No respiratory distress. Abdominal: Soft. There is no tenderness. Abdominal tenderness, primary, RLQ, guarding   No rebound   Musculoskeletal: Normal range of motion. She exhibits no deformity. Neurological: She is alert and oriented to person, place, and time. No gross neuro deficit   Skin: Skin is warm and dry. No rash noted. She is not diaphoretic. Psychiatric: She has a normal mood and affect. Nursing note and vitals reviewed. Diagnostic Study Results     Labs -  Labs Reviewed   METABOLIC PANEL, COMPREHENSIVE - Abnormal; Notable for the following:        Result Value    Glucose 172 (*)     BUN 23 (*)     Creatinine 1.47 (*)     GFR est AA 44 (*)     GFR est non-AA 37 (*)     Alk. phosphatase 146 (*)     All other components within normal limits   URINALYSIS W/ RFLX MICROSCOPIC - Abnormal; Notable for the following:     Glucose >1000 (*)     Blood SMALL (*)     All other components within normal limits   CBC WITH AUTOMATED DIFF   LIPASE   URINE MICROSCOPIC ONLY   POC LACTIC ACID       Radiologic Studies -   CT ABD PELV W CONT   Final Result        CT ABD PELV W CONT  IMPRESSION:     1. No acute intra-abdominal or pelvic abnormality. Normal caliber appendix. No  bowel obstruction. 2. Similar degree of stool burden to prior exam. Correlation for symptoms of  constipation may be helpful. Medical Decision Making   I am the first provider for this patient. I reviewed the vital signs, available nursing notes, past medical history, past surgical history, family history and social history. Vital Signs-Reviewed the patient's vital signs.     Records Reviewed: Nursing Notes (Time of Review: 6:57 AM)    ED Course: Progress Notes, Reevaluation, and Consults:  ED Course   Value Comment By Time   Creatinine: (!) 1.47 (Reviewed) Julia Prince DO 04/26 0705    Pt resting comfortably, explain she appeared constipated and the scripts I would be giving for home and expectant management for muscle strain. Pt reports resolution of sxs. Oneil Fitch, DO 04/26 0813       Provider Notes (Medical Decision Making):   Patient was seen on 04/18 for RLQ abdominal pain. Patient was tearful and notably distressed at that time as well. No acute findings on CT.     DDX: Strain, sprain, tension HA, migraine HA, SBO, appendicitis, constipation, UTI, acute on chronic pain exacerbation    62 y.o. female with noted past medical history who presented with severe headache and neck pain with acute onset yesterday. Vitals were WNL  The differential above was considered. The patient was given pain control and IVF. Diagnostics notable for mildly elevated creatinine likely from dehydration and diabetes, pt notified. Improved on exam, benign abd. HA resolved. Patient has no new complaints, changes, or physical findings. Diagnostic studies were reviewed with the patient. Pt and/or family's questions and concerns were addressed. Care plan was outlined, including follow-up with PCP/specialist and return precautions were discussed. Patient is felt to be stable for discharge at this time. Diagnosis     Clinical Impression:   1. Constipation, unspecified constipation type    2. Elevated serum creatinine    3. RLQ abdominal pain    4. Strain of cervical portion of right trapezius muscle        Disposition: Home    Follow-up Information     Follow up With Details Comments Contact Info    [facility] EMERGENCY DEPT  If symptoms worsen [address] [place] [address]    Brielle Elmore MD Schedule an appointment as soon as possible for a visit  [id number] [place] [address]  [phone]             Patient's Medications   Start Taking    SENNA (SENOKOT) 8.6 MG TABLET    Take 1 Tab by mouth daily.    Continue Taking    ALBUTEROL (PROVENTIL HFA, VENTOLIN HFA, PROAIR HFA) 90 MCG/ACTUATION INHALER    Take 1 Puff by inhalation every six (6) hours as needed for Wheezing. ALLERGY RELIEF, CETIRIZINE, 10 MG TABLET    take 1 tablet by mouth once daily    AMLODIPINE (NORVASC) 2.5 MG TABLET    take 1 tablet by mouth NIGHTLY    ATORVASTATIN (LIPITOR) 40 MG TABLET    Take 1 Tab by mouth nightly. BISOPROLOL-HYDROCHLOROTHIAZIDE (ZIAC) 2.5-6.25 MG PER TABLET    Take 1 Tab by mouth daily. DAPAGLIFLOZIN (FARXIGA) 10 MG TAB TABLET    Take 1 Tab by mouth daily. Indications: type 2 diabetes mellitus    DICYCLOMINE (BENTYL) 10 MG CAPSULE    Take 10 mg by mouth 4 times daily (before meals and nightly). DOXEPIN (SINEQUAN) 10 MG CAPSULE    Take 1 Cap by mouth nightly. GLUCOSE BLOOD VI TEST STRIPS (ONETOUCH ULTRA TEST) STRIP    Check blood glucose as directed    METFORMIN (GLUMETZA ER) 500 MG TG24 24 HOUR TABLET    take 1 tablet by mouth twice a day    OMEPRAZOLE (PRILOSEC) 10 MG CAPSULE    Take 1 Cap by mouth daily. ONDANSETRON (ZOFRAN ODT) 4 MG DISINTEGRATING TABLET    Take 1 Tab by mouth every eight (8) hours as needed for Nausea. OXYCODONE-ACETAMINOPHEN (PERCOCET) 5-325 MG PER TABLET    Take 1 Tab by mouth every six (6) hours as needed for Pain. Max Daily Amount: 4 Tabs. These Medications have changed    Modified Medication Previous Medication    POLYETHYLENE GLYCOL (MIRALAX) 17 GRAM/DOSE POWDER polyethylene glycol (MIRALAX) 17 gram/dose powder       Take 17 g by mouth two (2) times a day. 1 capful with 8 oz of water daily    Take 17 g by mouth daily.  1 capful with 8 oz of water daily   Stop Taking    No medications on file     _______________________________  Ced Rivas5 acting as a scribe for and in the presence of Chaya Keating, DO      April 26, 2018 at 6:57 AM       Provider Attestation:      I personally performed the services described in the documentation, reviewed the documentation, as recorded by the scribe in my presence, and it accurately and completely records my words and actions.  April 26, 2018 at 6:57 AM - Chaya Keating,

## 2018-04-26 NOTE — ED NOTES
Patient left for CT and restroom. Urine collected will give meds and retrieve lactic acid. Or notify oncoming nurse.

## 2018-04-26 NOTE — DISCHARGE INSTRUCTIONS
Please follow-up with your primary care doctor regarding your elevated creatinine and chronic constipation.

## 2018-04-30 ENCOUNTER — TELEPHONE (OUTPATIENT)
Dept: FAMILY MEDICINE CLINIC | Age: 58
End: 2018-04-30

## 2018-04-30 NOTE — TELEPHONE ENCOUNTER
Pt. Is requesting a refill on bisoprolol-hydrochlorothiazide (ZIAC) 2.5-6.25 mg per tablet. I advised her that another provider prescribed that medication. She stated she is still taking the medication in the morning and the amlodipine at bedtime. She is asking do she still need to take the Ziac along with the amlodipine. Asking to be called back. Please advise.

## 2018-05-02 DIAGNOSIS — I10 ESSENTIAL HYPERTENSION: Primary | Chronic | ICD-10-CM

## 2018-05-02 RX ORDER — BISOPROLOL FUMARATE AND HYDROCHLOROTHIAZIDE 2.5; 6.25 MG/1; MG/1
1 TABLET ORAL DAILY
Status: CANCELLED | OUTPATIENT
Start: 2018-05-02

## 2018-05-02 RX ORDER — BISOPROLOL FUMARATE AND HYDROCHLOROTHIAZIDE 2.5; 6.25 MG/1; MG/1
1 TABLET ORAL DAILY
Qty: 90 TAB | Refills: 3 | Status: SHIPPED | OUTPATIENT
Start: 2018-05-02 | End: 2019-05-28 | Stop reason: SDUPTHER

## 2018-06-12 ENCOUNTER — OFFICE VISIT (OUTPATIENT)
Dept: FAMILY MEDICINE CLINIC | Age: 58
End: 2018-06-12

## 2018-06-12 VITALS
BODY MASS INDEX: 28.34 KG/M2 | TEMPERATURE: 97.1 F | HEART RATE: 89 BPM | SYSTOLIC BLOOD PRESSURE: 136 MMHG | HEIGHT: 64 IN | WEIGHT: 166 LBS | RESPIRATION RATE: 16 BRPM | OXYGEN SATURATION: 96 % | DIASTOLIC BLOOD PRESSURE: 90 MMHG

## 2018-06-12 DIAGNOSIS — Z23 ENCOUNTER FOR IMMUNIZATION: ICD-10-CM

## 2018-06-12 DIAGNOSIS — G44.229 CHRONIC TENSION-TYPE HEADACHE, NOT INTRACTABLE: ICD-10-CM

## 2018-06-12 DIAGNOSIS — J45.20 MILD INTERMITTENT ASTHMA WITHOUT COMPLICATION: ICD-10-CM

## 2018-06-12 DIAGNOSIS — G47.33 OBSTRUCTIVE SLEEP APNEA: ICD-10-CM

## 2018-06-12 DIAGNOSIS — E11.65 TYPE 2 DIABETES MELLITUS WITH HYPERGLYCEMIA, WITHOUT LONG-TERM CURRENT USE OF INSULIN (HCC): ICD-10-CM

## 2018-06-12 DIAGNOSIS — R10.9 CHRONIC ABDOMINAL PAIN: ICD-10-CM

## 2018-06-12 DIAGNOSIS — I10 ESSENTIAL HYPERTENSION: ICD-10-CM

## 2018-06-12 DIAGNOSIS — G89.29 CHRONIC ABDOMINAL PAIN: ICD-10-CM

## 2018-06-12 DIAGNOSIS — Z01.818 PREOPERATIVE EXAMINATION: Primary | ICD-10-CM

## 2018-06-12 NOTE — MR AVS SNAPSHOT
[address] [place] [address] [address] [facility] [address] 
[phone] Patient: Orlando Whitehead MRN: [id number] DIT:0/9/3632 Visit Information Date & Time Provider Department Dept. Phone Encounter #  
 6/12/2018  2:15 PM Rui Quintana, [facility] [phone] [id number] Follow-up Instructions Return in about 4 months (around 10/12/2018) for diabetes. Upcoming Health Maintenance Date Due Pneumococcal 19-64 Medium Risk (1 of 1 - PPSV23) 2/4/1979 DTaP/Tdap/Td series (1 - Tdap) 2/4/1981 PAP AKA CERVICAL CYTOLOGY 2/4/1981 FOOT EXAM Q1 3/27/2018 MICROALBUMIN Q1 3/27/2018 LIPID PANEL Q1 3/27/2018 EYE EXAM RETINAL OR DILATED Q1 5/29/2018 HEMOGLOBIN A1C Q6M 7/19/2018 Influenza Age 5 to Adult 8/1/2018 BREAST CANCER SCRN MAMMOGRAM 11/24/2019 COLONOSCOPY 3/3/2026 Allergies as of 6/12/2018  Review Complete On: 6/12/2018 By: Rui Quintana MD  
  
 Severity Noted Reaction Type Reactions Macrodantin [Nitrofurantoin Macrocrystalline]  01/31/2012    Itching, Other (comments) Tape [Adhesive]  09/30/2014    Other (comments) Paper tape-- feels like burning skin Burned skin when had wound vac Current Immunizations  Reviewed on 6/12/2018 Name Date Influenza Vaccine 11/15/2016 Pneumococcal Polysaccharide (PPSV-23) 6/12/2018 Reviewed by Rui Quintana MD on 6/12/2018 at  3:03 PM  
You Were Diagnosed With   
  
 Codes Comments Preoperative examination    -  Primary ICD-10-CM: G67.486 ICD-9-CM: V72.84 Type 2 diabetes mellitus with hyperglycemia, without long-term current use of insulin (HCC)     ICD-10-CM: E11.65 ICD-9-CM: 250.00, 790.29 Essential hypertension     ICD-10-CM: I10 
ICD-9-CM: 401.9 Chronic abdominal pain     ICD-10-CM: R10.9, G89.29 ICD-9-CM: 789.00, 338.29 Chronic tension-type headache, not intractable     ICD-10-CM: L53.535 ICD-9-CM: 339.12   
 Mild intermittent asthma without complication     [facility]-97-CO: J45.20 ICD-9-CM: 493.90 Obstructive sleep apnea     ICD-10-CM: G47.33 
ICD-9-CM: 327.23 Encounter for immunization     ICD-10-CM: S08 ICD-9-CM: V03.89 Vitals BP Pulse Temp Resp Height(growth percentile) Weight(growth percentile) 136/90 (BP 1 Location: Right arm, BP Patient Position: Sitting) 89 97.1 °F (36.2 °C) (Oral) 16 5' 4\" (1.626 m) 166 lb (75.3 kg) SpO2 BMI OB Status Smoking Status 96% 28.49 kg/m2 Hysterectomy Never Smoker BMI and BSA Data Body Mass Index Body Surface Area  
 28.49 kg/m 2 1.84 m 2 Preferred Pharmacy Pharmacy Name Phone [facility] [address]  [facility] [phone] Your Updated Medication List  
  
   
This list is accurate as of 6/12/18  3:19 PM.  Always use your most recent med list.  
  
  
  
  
 albuterol 90 mcg/actuation inhaler Commonly known as:  PROVENTIL HFA, VENTOLIN HFA, PROAIR HFA Take 1 Puff by inhalation every six (6) hours as needed for Wheezing. ALLERGY RELIEF (CETIRIZINE) 10 mg tablet Generic drug:  cetirizine  
take 1 tablet by mouth once daily  
  
 amLODIPine 2.5 mg tablet Commonly known as:  NORVASC  
take 1 tablet by mouth NIGHTLY  
  
 atorvastatin 40 mg tablet Commonly known as:  LIPITOR Take 1 Tab by mouth nightly. BENTYL 10 mg capsule Generic drug:  dicyclomine Take 10 mg by mouth 4 times daily (before meals and nightly). bisoprolol-hydroCHLOROthiazide 2.5-6.25 mg per tablet Commonly known as:  [facility] Take 1 Tab by mouth daily. dapagliflozin 10 mg Tab tablet Commonly known as:  [employer] Take 1 Tab by mouth daily. Indications: type 2 diabetes mellitus  
  
 doxepin 10 mg capsule Commonly known as:  SINEquan Take 1 Cap by mouth nightly. glucose blood VI test strips strip Commonly known as:  ONETOUCH ULTRA TEST Check blood glucose as directed metFORMIN 500 mg Tg24 24 hour tablet Commonly known as:  GLUMETZA ER  
take 1 tablet by mouth twice a day  
  
 omeprazole 10 mg capsule Commonly known as:  PRILOSEC Take 1 Cap by mouth daily. ondansetron 4 mg disintegrating tablet Commonly known as:  ZOFRAN ODT Take 1 Tab by mouth every eight (8) hours as needed for Nausea. oxyCODONE-acetaminophen 5-325 mg per tablet Commonly known as:  PERCOCET Take 1 Tab by mouth every six (6) hours as needed for Pain. Max Daily Amount: 4 Tabs. polyethylene glycol 17 gram/dose powder Commonly known as:  Lugene Minder Take 17 g by mouth two (2) times a day. 1 capful with 8 oz of water daily  
  
 senna 8.6 mg tablet Commonly known as:  [employer] Take 1 Tab by mouth daily. We Performed the Following  DIABETES FOOT EXAM [7 Custom] PNEUMOCOCCAL POLYSACCHARIDE VACCINE, 23-VALENT, ADULT OR IMMUNOSUPPRESSED PT DOSE, [48340 CPT(R)] SLEEP MEDICINE REFERRAL [JMX347 Custom] Comments:  
 Please evaluate 63 y/o female patient for sleep apnea; daytime somnolence, snoring, apnea. Follow-up Instructions Return in about 4 months (around 10/12/2018) for diabetes. To-Do List   
 06/12/2018 Lab:  LIPID PANEL   
  
 06/12/2018 Lab:  MICROALBUMIN, UR, RAND W/ MICROALB/CREAT RATIO Referral Information Referral ID Referred By Referred To  
  
 [id number] BERNADETTE DEGROOT Not Available Visits Status Start Date End Date 1 New Request 6/12/18 6/12/19 If your referral has a status of pending review or denied, additional information will be sent to support the outcome of this decision. Introducing [facility]! [employer] introduces [employer] patient portal. Now you can access parts of your medical record, email your doctor's office, and request medication refills online. 1. In your internet browser, go to [email or web address] Huff [employer]BA [email or web address] 2. Click on the First Time User? Click Here link in the Sign In box. You will see the New Member Sign Up page. 3. Enter your [employer] Access Code exactly as it appears below. You will not need to use this code after youve completed the sign-up process. If you do not sign up before the expiration date, you must request a new code. · [employer] Access Code: [email or web address] Expires: 9/10/2018  3:19 PM 
 
4. Enter the last four digits of your Social Security Number (xxxx) and Date of Birth (mm/dd/yyyy) as indicated and click Submit. You will be taken to the next sign-up page. 5. Create a [employer] ID. This will be your [employer] login ID and cannot be changed, so think of one that is secure and easy to remember. 6. Create a [employer] password. You can change your password at any time. 7. Enter your Password Reset Question and Answer. This can be used at a later time if you forget your password. 8. Enter your e-mail address. You will receive e-mail notification when new information is available in [address] 9. Click Sign Up. You can now view and download portions of your medical record. 10. Click the Download Summary menu link to download a portable copy of your medical information. If you have questions, please visit the Frequently Asked Questions section of the [employer] website. Remember, [employer] is NOT to be used for urgent needs. For medical emergencies, dial 911. Now available from your iPhone and Android! Please provide this summary of care documentation to your next provider. Your primary care clinician is listed as Giovani Trujillo. If you have any questions after today's visit, please call [phone].

## 2018-06-12 NOTE — PROGRESS NOTES
1. Have you been to the ER, urgent care clinic since your last visit? Hospitalized since your last visit? NO    2. Have you seen or consulted any other health care providers outside of the [address] [place] [address] since your last visit? Include any pap smears or colon screening.    NO

## 2018-06-13 LAB
CHOLEST SERPL-MCNC: 212 MG/DL (ref 110–200)
CREATININE, URINE: 69 MG/DL
HDLC SERPL-MCNC: 3.9 MG/DL (ref 0–5)
HDLC SERPL-MCNC: 55 MG/DL (ref 40–59)
LDLC SERPL CALC-MCNC: 114 MG/DL (ref 50–99)
MICROALB/CREAT RATIO, 140286: NORMAL MCG/MG OF CREATININE (ref 0–30)
MICROALBUMIN,URINE RANDOM 140054: <12 UG/ML (ref 0.1–17)
TRIGL SERPL-MCNC: 215 MG/DL (ref 40–149)
VLDLC SERPL CALC-MCNC: 43 MG/DL (ref 8–30)

## 2018-06-20 ENCOUNTER — DOCUMENTATION ONLY (OUTPATIENT)
Dept: FAMILY MEDICINE CLINIC | Age: 58
End: 2018-06-20

## 2018-06-20 NOTE — PROGRESS NOTES
DOCUMENTATION ONLY: Pre-op clearance paperwork for the patient Right total knee replacement was faxed to [facility] on 06/13/2018. Fax confirmation was received and sent to central scanning.

## 2018-06-22 ENCOUNTER — DOCUMENTATION ONLY (OUTPATIENT)
Dept: FAMILY MEDICINE CLINIC | Age: 58
End: 2018-06-22

## 2018-06-22 NOTE — PROGRESS NOTES
DOCUMENTATION ONLY\" Tiigi 34 sent over progress notes from the patient Right Knee Replacement on 06/19/2018. After review, documentation was sent to central scanning on 06/22/2018.

## 2018-07-25 ENCOUNTER — APPOINTMENT (OUTPATIENT)
Dept: PHYSICAL THERAPY | Age: 58
End: 2018-07-25

## 2018-07-25 ENCOUNTER — HOSPITAL ENCOUNTER (OUTPATIENT)
Dept: PHYSICAL THERAPY | Age: 58
Discharge: HOME | End: 2018-07-25
Payer: COMMERCIAL

## 2018-07-25 PROCEDURE — 97016 VASOPNEUMATIC DEVICE THERAPY: CPT

## 2018-07-25 PROCEDURE — 97162 PT EVAL MOD COMPLEX 30 MIN: CPT

## 2018-07-25 PROCEDURE — 97140 MANUAL THERAPY 1/> REGIONS: CPT

## 2018-07-25 PROCEDURE — 97110 THERAPEUTIC EXERCISES: CPT

## 2018-07-25 NOTE — PROGRESS NOTES
[facility] PHYSICAL THERAPY AT [address] 57  Phone: [phone] Fax: [phone] / [address] [address] PHYSICAL THERAPY SERVICES  Patient Name: Carlitos Soliz : 1960   Medical   Diagnosis: Right knee pain [M25.561]  Acute postoperative pain of knee [G89.18, M25.569] Treatment Diagnosis: SP R TKR    Onset Date: DOS 18     Referral Source: Elijah Lopez MD Start of Care [facility]): 2018   Prior Hospitalization: See medical history Provider #: [id number]   Prior Level of Function: Functional I - no AD, hx R TKR   Comorbidities: R TKR,DM, arthritis, HTN, asthma    Medications: Verified on Patient Summary List   The Plan of Care and following information is based on the information from the initial evaluation.   ========================================================================  Assessment / key information:  Pt is a 62y.o. year old female who presents with sp R TKR DOS 18. Patient had lapse in PT treatment sec to readmittance for bowel obstruction. HHPT started after patient was DC'd and was last on . 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 Current deficits include: increased pain to 10/10 at worst, decreased AROM and strength per chart, decreased gait quality with FWW with decreased HS and TO with instability noted in midstance, poor myofascial flexibility and severe tenderness to light touch / increased sensitivity of scar. 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 Home exercise program initiated on initial evaluation to address these deficits. Pt would benefit from PT to address these deficits for increased functional mobility and QOL.       AROM               PROM       Strength (1-5)  Hip Right Right Right   Flexion     4- with quad lag   Abduction     4   Extension     Unable for to pain with pressure in prone   Knee x x x   Extension 3 0 3-   Flexion 107 116 4   Ankle  x x x   Dorsiflexion     5   Plantarflexion     Unable      ========================================================================  Eval Complexity: History: MEDIUM  Complexity : 1-2 comorbidities / personal factors will impact the outcome/ POC Exam:HIGH Complexity : 4+ Standardized tests and measures addressing body structure, function, activity limitation and / or participation in recreation  Presentation: MEDIUM Complexity : Evolving with changing characteristics  Clinical Decision Making:MEDIUM Complexity : FOTO score of 26-74Overall Complexity:MEDIUM  Problem List: pain affecting function, decrease ROM, decrease strength, edema affecting function, impaired gait/ balance, decrease ADL/ functional abilitiies, decrease activity tolerance, decrease flexibility/ joint mobility, decrease transfer abilities and other FOTO 33/100    Treatment Plan may include any combination of the following: Therapeutic exercise, Therapeutic activities, Neuromuscular re-education, Physical agent/modality, Gait/balance training, Manual therapy, Aquatic therapy, Patient education, Self Care training, Functional mobility training, Home safety training and Stair training  Patient / Family readiness to learn indicated by: asking questions, trying to perform skills and interest  Persons(s) to be included in education: patient (P)  Barriers to Learning/Limitations: None  Measures taken:    Patient Goal (s): \"no pain, walk with no assistance \"   Patient self reported health status: fair  Rehabilitation Potential: good   Short Term Goals: To be accomplished in  1  weeks:  1. Pt will be independent and compliant with HEP   Long Term Goals: To be accomplished in  8-12  treatments:  1. Patient will increase FOTO score to 52/100 for indications of increased functional mobility.     2.  Patient will demo increased AROM knee flexion to 120 for ease with transfers   3. Patient will demo AROM knee extension to 0 deg for normalized gait  4. Patient will demo 4/5 knee extension strength for ease with stair negotiation   Frequency / Duration:   Patient to be seen  2-3  times per week for 8-12  treatments:  Patient / Caregiver education and instruction: self care, activity modification and exercises  G-Codes (GP): ALLEGRA  Therapist Signature: Jose Dan PT Date: 0/04/6467   Certification Period: NA Time: 1104am    ========================================================================  I certify that the above Physical Therapy Services are being furnished while the patient is under my care. I agree with the treatment plan and certify that this therapy is necessary. Physician Signature:        Date:       Time:   Please sign and return to [facility] or you may fax the signed copy to [phone]. Thank you.

## 2018-07-25 NOTE — PROGRESS NOTES
PT KNEE EVAL AND TREATMENT    Patient Name: Emil Andrade  Date:2018  : 1960  [x]  Patient  Verified  Payor: Cordelia Nogueira / Plan: [employer] PPO / Product Type: PPO /    In time:1011  Out time:1105  Total Treatment Time (min): 54  Total timed codes: 25  Total 1:1 44 min   Visit #: 1 of 8-12    Treatment Area: Right knee pain [M25.561]  Acute postoperative pain of knee [G89.18, M25.569]    SUBJECTIVE  Pain Level (0-10 scale): (C):  4 (B): 3 (W):  10  Any medication changes, allergies to medications, diagnosis change, or new procedure performed: see summary sheet for update  Subjective functional status/changes:  CHIEF COMPLAINT: Patient reports with sp R TKR DOS 18. Patient had lapse in PT treatment sec to readmittance for bowel obstruction. HHPT started after patient was DC'd and was last on . 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    OBJECTIVE  Posture:  WNL     Gait:  FWW with decrease HS and toe     ROM / Strength                            AROM          PROM       Strength (1-5)  Hip Right Right Right   Flexion   4- with quad lag   Abduction   4   Extension   Unable for to pain with pressure in prone   Knee x x x   Extension 3 0 3-   Flexion 107 116 4   Ankle  x x x   Dorsiflexion   5   Plantarflexion   Unable to SL HR 3/5   Core/ bridge  x x x        Flexibility:   Hamstrings:  90/90 HS test +  Quadriceps: Deborah Test +  Gastroc:   +  Other: ITB tightness    Palpation: taut band of ITB   Severe tenderness to light touch over incision     Optional Tests:  Patellar Positioning (Static) WNL   Patellar Tracking WNL      Patellar Mobility hypo with poor quad setting        Girth Measurements:     Cm at joint line   Left 35   Right  37             Other tests/comments:  SL 3 sec R, 10 sec + L       Patient Education/ Therapeutic Exercise : [x] Discussed POT including PT expectation, established HEP with pictures and instruction - CONT HHPT THEREX, BIKE 5 MIN BACKWARDS TO LOOSEN BEFORE MT  (minutes) : 13    Manual: 12 min PROM flexion and extension, manual HS stretch, gentle scar massage/ desensitization     Modality (rationale): decrease inflammation and pain   [x]  VASO 10 min , min temp mod compression     Pain Level (0-10 scale) post treatment: 3  ASSESSMENT  [x]  See Plan of Care    PLAN  [x]  Upgrade activities as tolerated     [x] Other:_ POC  2-3 x 8-12    Jose Dan, PT 7/25/2018     Justification for Eval Code Complexity:  Patient History : chronicity, hx of knee contracture after L TKR  Examination see exam   Clinical Presentation: evolving sec to post op   Clinical Decision Making : FOTO : 33 /100

## 2018-07-27 ENCOUNTER — APPOINTMENT (OUTPATIENT)
Dept: PHYSICAL THERAPY | Age: 58
End: 2018-07-27
Payer: COMMERCIAL

## 2018-07-29 ENCOUNTER — HOSPITAL ENCOUNTER (EMERGENCY)
Age: 58
Discharge: HOME | End: 2018-07-30
Attending: EMERGENCY MEDICINE
Payer: COMMERCIAL

## 2018-07-29 ENCOUNTER — APPOINTMENT (OUTPATIENT)
Dept: GENERAL RADIOLOGY | Age: 58
End: 2018-07-29
Attending: PHYSICIAN ASSISTANT
Payer: COMMERCIAL

## 2018-07-29 ENCOUNTER — APPOINTMENT (OUTPATIENT)
Dept: CT IMAGING | Age: 58
End: 2018-07-29
Attending: PHYSICIAN ASSISTANT
Payer: COMMERCIAL

## 2018-07-29 DIAGNOSIS — R10.30 LOWER ABDOMINAL PAIN: Primary | ICD-10-CM

## 2018-07-29 DIAGNOSIS — R11.0 NAUSEA WITHOUT VOMITING: ICD-10-CM

## 2018-07-29 DIAGNOSIS — H10.32 ACUTE CONJUNCTIVITIS OF LEFT EYE, UNSPECIFIED ACUTE CONJUNCTIVITIS TYPE: ICD-10-CM

## 2018-07-29 LAB
ANION GAP SERPL CALC-SCNC: 8 MMOL/L (ref 3–18)
APPEARANCE UR: CLEAR
BASOPHILS # BLD: 0 K/UL (ref 0–0.1)
BASOPHILS NFR BLD: 0 % (ref 0–2)
BILIRUB UR QL: NEGATIVE
BUN SERPL-MCNC: 16 MG/DL (ref 7–18)
BUN/CREAT SERPL: 13 (ref 12–20)
CALCIUM SERPL-MCNC: 9.1 MG/DL (ref 8.5–10.1)
CHLORIDE SERPL-SCNC: 108 MMOL/L (ref 100–108)
CO2 SERPL-SCNC: 28 MMOL/L (ref 21–32)
COLOR UR: YELLOW
CREAT SERPL-MCNC: 1.19 MG/DL (ref 0.6–1.3)
DIFFERENTIAL METHOD BLD: NORMAL
EOSINOPHIL # BLD: 0.1 K/UL (ref 0–0.4)
EOSINOPHIL NFR BLD: 2 % (ref 0–5)
ERYTHROCYTE [DISTWIDTH] IN BLOOD BY AUTOMATED COUNT: 14.2 % (ref 11.6–14.5)
GLUCOSE SERPL-MCNC: 240 MG/DL (ref 74–99)
GLUCOSE UR STRIP.AUTO-MCNC: >1000 MG/DL
HCT VFR BLD AUTO: 39.8 % (ref 35–45)
HGB BLD-MCNC: 12.8 G/DL (ref 12–16)
HGB UR QL STRIP: NEGATIVE
KETONES UR QL STRIP.AUTO: NEGATIVE MG/DL
LEUKOCYTE ESTERASE UR QL STRIP.AUTO: NEGATIVE
LYMPHOCYTES # BLD: 1.8 K/UL (ref 0.9–3.6)
LYMPHOCYTES NFR BLD: 29 % (ref 21–52)
MCH RBC QN AUTO: 28.1 PG (ref 24–34)
MCHC RBC AUTO-ENTMCNC: 32.2 G/DL (ref 31–37)
MCV RBC AUTO: 87.5 FL (ref 74–97)
MONOCYTES # BLD: 0.4 K/UL (ref 0.05–1.2)
MONOCYTES NFR BLD: 6 % (ref 3–10)
NEUTS SEG # BLD: 3.9 K/UL (ref 1.8–8)
NEUTS SEG NFR BLD: 63 % (ref 40–73)
NITRITE UR QL STRIP.AUTO: NEGATIVE
PH UR STRIP: 5 [PH] (ref 5–8)
PLATELET # BLD AUTO: 302 K/UL (ref 135–420)
PMV BLD AUTO: 10.6 FL (ref 9.2–11.8)
POTASSIUM SERPL-SCNC: 4 MMOL/L (ref 3.5–5.5)
PROT UR STRIP-MCNC: NEGATIVE MG/DL
RBC # BLD AUTO: 4.55 M/UL (ref 4.2–5.3)
SODIUM SERPL-SCNC: 144 MMOL/L (ref 136–145)
SP GR UR REFRACTOMETRY: >1.03 (ref 1–1.03)
UROBILINOGEN UR QL STRIP.AUTO: 0.2 EU/DL (ref 0.2–1)
WBC # BLD AUTO: 6.2 K/UL (ref 4.6–13.2)

## 2018-07-29 PROCEDURE — 96361 HYDRATE IV INFUSION ADD-ON: CPT

## 2018-07-29 PROCEDURE — 96374 THER/PROPH/DIAG INJ IV PUSH: CPT

## 2018-07-29 PROCEDURE — 74011250636 HC RX REV CODE- 250/636

## 2018-07-29 PROCEDURE — 74011250636 HC RX REV CODE- 250/636: Performed by: PHYSICIAN ASSISTANT

## 2018-07-29 PROCEDURE — 80048 BASIC METABOLIC PNL TOTAL CA: CPT | Performed by: PHYSICIAN ASSISTANT

## 2018-07-29 PROCEDURE — 96375 TX/PRO/DX INJ NEW DRUG ADDON: CPT

## 2018-07-29 PROCEDURE — 81003 URINALYSIS AUTO W/O SCOPE: CPT | Performed by: PHYSICIAN ASSISTANT

## 2018-07-29 PROCEDURE — 74176 CT ABD & PELVIS W/O CONTRAST: CPT

## 2018-07-29 PROCEDURE — 85025 COMPLETE CBC W/AUTO DIFF WBC: CPT | Performed by: PHYSICIAN ASSISTANT

## 2018-07-29 PROCEDURE — 99284 EMERGENCY DEPT VISIT MOD MDM: CPT

## 2018-07-29 PROCEDURE — 74019 RADEX ABDOMEN 2 VIEWS: CPT

## 2018-07-29 RX ORDER — ERYTHROMYCIN 5 MG/G
OINTMENT OPHTHALMIC
Qty: 3.5 G | Refills: 0 | Status: SHIPPED | OUTPATIENT
Start: 2018-07-29 | End: 2018-08-05

## 2018-07-29 RX ORDER — MORPHINE SULFATE 4 MG/ML
INJECTION INTRAVENOUS
Status: COMPLETED
Start: 2018-07-29 | End: 2018-07-29

## 2018-07-29 RX ORDER — DICYCLOMINE HYDROCHLORIDE 10 MG/ML
20 INJECTION INTRAMUSCULAR
Status: COMPLETED | OUTPATIENT
Start: 2018-07-29 | End: 2018-07-30

## 2018-07-29 RX ORDER — MORPHINE SULFATE 4 MG/ML
2 INJECTION INTRAVENOUS ONCE
Status: COMPLETED | OUTPATIENT
Start: 2018-07-29 | End: 2018-07-29

## 2018-07-29 RX ORDER — ONDANSETRON 4 MG/1
4 TABLET, ORALLY DISINTEGRATING ORAL
Qty: 12 TAB | Refills: 0 | Status: SHIPPED | OUTPATIENT
Start: 2018-07-29 | End: 2018-11-06 | Stop reason: SDUPTHER

## 2018-07-29 RX ORDER — ERYTHROMYCIN 5 MG/G
OINTMENT OPHTHALMIC
Status: COMPLETED | OUTPATIENT
Start: 2018-07-29 | End: 2018-07-30

## 2018-07-29 RX ORDER — DICYCLOMINE HYDROCHLORIDE 20 MG/1
20 TABLET ORAL EVERY 6 HOURS
Qty: 20 TAB | Refills: 0 | Status: SHIPPED | OUTPATIENT
Start: 2018-07-29 | End: 2018-08-03

## 2018-07-29 RX ORDER — ONDANSETRON 2 MG/ML
4 INJECTION INTRAMUSCULAR; INTRAVENOUS
Status: COMPLETED | OUTPATIENT
Start: 2018-07-29 | End: 2018-07-29

## 2018-07-29 RX ADMIN — MORPHINE SULFATE 2 MG: 4 INJECTION INTRAVENOUS at 19:15

## 2018-07-29 RX ADMIN — ONDANSETRON 4 MG: 2 INJECTION, SOLUTION INTRAMUSCULAR; INTRAVENOUS at 19:15

## 2018-07-29 RX ADMIN — SODIUM CHLORIDE 1000 ML: 900 INJECTION, SOLUTION INTRAVENOUS at 17:39

## 2018-07-29 NOTE — ED TRIAGE NOTES
Pt c/o mulitple issues, abdominal pain, groin pain, pink eye, no bm since Friday, diarrhea on Friday, and needing surgery on her knee.

## 2018-07-29 NOTE — ED NOTES
Diarrhea now constipation. Abdominal pain, RLQ and into groin. Urinary frequency. Abdomen very tender. I performed a brief evaluation, including history and physical, of the patient here in triage and I have determined that pt will need further treatment and evaluation from the main side ER physician. I have placed initial orders to help in expediting patients care. July 29, 2018 at 4:46 PM - Kirstin Fitzpatrick PA-C Visit Vitals  /87 (BP 1 Location: Right arm, BP Patient Position: At rest)  Pulse (!) 111  Temp 98.6 °F (37 °C)  Resp 18  Ht 5' 4\" (1.626 m)  Wt 68 kg (150 lb)  SpO2 100%  BMI 25.75 kg/m2

## 2018-07-29 NOTE — ED PROVIDER NOTES
EMERGENCY DEPARTMENT HISTORY AND PHYSICAL EXAM 
 
Date: 7/29/2018 Patient Name: Thom Lange History of Presenting Illness Chief Complaint Patient presents with  Abdominal Pain  Groin Pain  Pink Eye  
 Urinary Frequency History Provided By: Patient Chief Complaint: lower abdominal pain Duration: 3 Days Timing:  Acute Location: lower abdomen Quality: Stabbing, Cramping and Sharp Severity: 10 out of 10 Modifying Factors: States that she has had several SBOs, most recent three weeks ago Associated Symptoms: denies fevers, chills, + initally diarrhea, now no BM since friday, no chest pain, SOB, + left eye erythema Additional History (Context): Thom Lange is a 62 y.o. female with asthma, anemia, DM, abdominal adhesions, SBO, HTN, menopause, GERD, constipation, cocaine abuse who presents with C/O progressively worsening lower abdominal pain for the past three days. States that her pain started on Friday (3 days ago) and that it initially started with diarrhea. The diarrhea stopped the same day and she has not had a bowel movement since. Admits to nausea, denies vomiting. States stool color when she saw it with the diarrhea was brown. Denies fevers, chills. Has a history of several SBO. Her surgeon is Dr. Kade Garcia. Also reports one day of left sided eye itching, redness, and irritation. Denies any vision changes, sensation of FB. PCP: Chandrika Elliott MD 
 
Current Facility-Administered Medications Medication Dose Route Frequency Provider Last Rate Last Dose  iopamidol (ISOVUE 300) 61 % contrast injection 100 mL  100 mL IntraVENous RAD Baylee Tian MD      
 
Current Outpatient Prescriptions Medication Sig Dispense Refill  ondansetron (ZOFRAN ODT) 4 mg disintegrating tablet Take 1 Tab by mouth every eight (8) hours as needed for Nausea.  12 Tab 0  
 dicyclomine (BENTYL) 20 mg tablet Take 1 Tab by mouth every six (6) hours for 20 doses. 20 Tab 0  
 erythromycin (ILOTYCIN) ophthalmic ointment Apply a small ribbon of ointment to the left eye three times a day for 7 days 3.5 g 0  
 bisoprolol-hydroCHLOROthiazide (ZIAC) 2.5-6.25 mg per tablet Take 1 Tab by mouth daily. 90 Tab 3  polyethylene glycol (MIRALAX) 17 gram/dose powder Take 17 g by mouth two (2) times a day. 1 capful with 8 oz of water daily 119 g 0  
 senna (SENOKOT) 8.6 mg tablet Take 1 Tab by mouth daily. 30 Tab 0  
 metFORMIN (GLUMETZA ER) 500 mg TG24 24 hour tablet take 1 tablet by mouth twice a day 60 Tab 2  
 amLODIPine (NORVASC) 2.5 mg tablet take 1 tablet by mouth NIGHTLY 90 Tab 1  ALLERGY RELIEF, CETIRIZINE, 10 mg tablet take 1 tablet by mouth once daily  0  
 doxepin (SINEQUAN) 10 mg capsule Take 1 Cap by mouth nightly. 30 Cap 11  
 dapagliflozin (FARXIGA) 10 mg tab tablet Take 1 Tab by mouth daily. Indications: type 2 diabetes mellitus 90 Tab 3  
 omeprazole (PRILOSEC) 10 mg capsule Take 1 Cap by mouth daily. 30 Cap 11  
 albuterol (PROVENTIL HFA, VENTOLIN HFA, PROAIR HFA) 90 mcg/actuation inhaler Take 1 Puff by inhalation every six (6) hours as needed for Wheezing. 1 Inhaler 0  
 atorvastatin (LIPITOR) 40 mg tablet Take 1 Tab by mouth nightly. 90 Tab 3  
 glucose blood VI test strips (ONETOUCH ULTRA TEST) strip Check blood glucose as directed 100 Strip 1 Past History Past Medical History: 
Past Medical History:  
Diagnosis Date  Abdominal adhesions  Anemia  Arthritis   
 all over the body  Asthma  Cocaine abuse  Diabetes mellitus  Dyskinesia   
 bilateral  
 Essential hypertension  GERD (gastroesophageal reflux disease)  Hypertension  Menopause  Microhematuria  Stool color black Past Surgical History: 
Past Surgical History:  
Procedure Laterality Date  HX CHOLECYSTECTOMY  6/28/10  
 HX COLONOSCOPY    
 HX ENDOSCOPY    
 HX HERNIA REPAIR    
 HX HYSTERECTOMY Fibroids  HX KNEE REPLACEMENT Left  HX OOPHORECTOMY  12/2000  HX ORTHOPAEDIC Right   
 ankle- multiple surgeries  HX SMALL BOWEL RESECTION  12/2000  HX SMALL BOWEL RESECTION  02/21/2017 Dr. Carley Gama [address] [facility] Family History: 
Family History Problem Relation Age of Onset  Hypertension Other   
  parent  Breast Cancer Other [address]  
 Heart Disease Father  Hypertension Father  Diabetes Father  Diabetes Mother  Hypertension Other   
  sibling  Diabetes Other   
  parent  Diabetes Sister  Kidney Disease Brother  Diabetes Brother Social History: 
Social History Substance Use Topics  Smoking status: Never Smoker  Smokeless tobacco: Never Used  Alcohol use No  
 
 
Allergies: Allergies Allergen Reactions  Macrodantin [Nitrofurantoin Macrocrystalline] Itching and Other (comments)  Tape [Adhesive] Other (comments) Paper tape-- feels like burning skin Burned skin when had wound vac Review of Systems Review of Systems Constitutional: Positive for chills and diaphoresis. Negative for fever. Eyes: Positive for pain, redness and itching. Negative for photophobia and visual disturbance. Respiratory: Negative for chest tightness, shortness of breath and wheezing. Cardiovascular: Negative for chest pain and palpitations. Gastrointestinal: Positive for abdominal pain. Initially diarrhea on three days ago and now no BM since Genitourinary: Negative. Musculoskeletal: Negative. Skin: Negative. Neurological: Negative. All other systems reviewed and are negative. Physical Exam  
 
Vitals:  
 07/29/18 1900 07/29/18 2045 07/29/18 2049 07/30/18 0006 BP: (!) 150/95 130/90  102/86 Pulse: (!) 104 88 94 Resp: 19 19 18 Temp:      
SpO2:   100% Weight:      
Height:      
 
Physical Exam  
Constitutional: She is oriented to person, place, and time. She appears well-developed and well-nourished.  No distress. HENT:  
Head: Normocephalic and atraumatic. Eyes: EOM and lids are normal. Pupils are equal, round, and reactive to light. Lids are everted and swept, no foreign bodies found. Right eye exhibits no chemosis. Left eye exhibits no chemosis. Right conjunctiva is not injected. Right conjunctiva has no hemorrhage. Left conjunctiva is injected. Left conjunctiva has no hemorrhage. Fundoscopic exam: The right eye shows no hemorrhage and no papilledema. The left eye shows no hemorrhage and no papilledema. Mild scleral erythema, no isabel foreign body, no hyphemia, no chemosis. Neck: Neck supple. Cardiovascular: Normal rate and regular rhythm. Exam reveals no gallop and no friction rub. No murmur heard. Pulmonary/Chest: Effort normal and breath sounds normal. No respiratory distress. She has no wheezes. She has no rales. Abdominal: Soft. Bowel sounds are normal. She exhibits no distension and no mass. There is tenderness. There is no rebound and no guarding. + TTP diffusely but worse in the lower abdomen. Noted healed surgical incisions. No tympany, no distension, no rebound, no guarding. No CVAT Musculoskeletal: Normal range of motion. She exhibits no tenderness or deformity. Lymphadenopathy:  
  She has no cervical adenopathy. Neurological: She is alert and oriented to person, place, and time. No cranial nerve deficit. Skin: Skin is warm and dry. No rash noted. She is not diaphoretic. Psychiatric: She has a normal mood and affect. Her behavior is normal.  
Nursing note and vitals reviewed. Diagnostic Study Results Labs - Recent Results (from the past 12 hour(s)) URINALYSIS W/ RFLX MICROSCOPIC Collection Time: 07/29/18  4:48 PM  
Result Value Ref Range Color YELLOW Appearance CLEAR Specific gravity >1.030 (H) 1.005 - 1.030  
 pH (UA) 5.0 5.0 - 8.0 Protein NEGATIVE  NEG mg/dL Glucose >1000 (A) NEG mg/dL  Ketone NEGATIVE  NEG mg/dL Bilirubin NEGATIVE  NEG Blood NEGATIVE  NEG Urobilinogen 0.2 0.2 - 1.0 EU/dL Nitrites NEGATIVE  NEG Leukocyte Esterase NEGATIVE  NEG    
CBC WITH AUTOMATED DIFF Collection Time: 07/29/18  5:30 PM  
Result Value Ref Range WBC 6.2 4.6 - 13.2 K/uL  
 RBC 4.55 4.20 - 5.30 M/uL  
 HGB 12.8 12.0 - 16.0 g/dL HCT 39.8 35.0 - 45.0 % MCV 87.5 74.0 - 97.0 FL  
 MCH 28.1 24.0 - 34.0 PG  
 MCHC 32.2 31.0 - 37.0 g/dL  
 RDW 14.2 11.6 - 14.5 % PLATELET 989 860 - 375 K/uL MPV 10.6 9.2 - 11.8 FL  
 NEUTROPHILS 63 40 - 73 % LYMPHOCYTES 29 21 - 52 % MONOCYTES 6 3 - 10 % EOSINOPHILS 2 0 - 5 % BASOPHILS 0 0 - 2 %  
 ABS. NEUTROPHILS 3.9 1.8 - 8.0 K/UL  
 ABS. LYMPHOCYTES 1.8 0.9 - 3.6 K/UL  
 ABS. MONOCYTES 0.4 0.05 - 1.2 K/UL  
 ABS. EOSINOPHILS 0.1 0.0 - 0.4 K/UL  
 ABS. BASOPHILS 0.0 0.0 - 0.1 K/UL  
 DF AUTOMATED METABOLIC PANEL, BASIC Collection Time: 07/29/18  5:30 PM  
Result Value Ref Range Sodium 144 136 - 145 mmol/L Potassium 4.0 3.5 - 5.5 mmol/L Chloride 108 100 - 108 mmol/L  
 CO2 28 21 - 32 mmol/L Anion gap 8 3.0 - 18 mmol/L Glucose 240 (H) 74 - 99 mg/dL BUN 16 7.0 - 18 MG/DL Creatinine 1.19 0.6 - 1.3 MG/DL  
 BUN/Creatinine ratio 13 12 - 20 GFR est AA 56 (L) >60 ml/min/1.73m2 GFR est non-AA 47 (L) >60 ml/min/1.73m2 Calcium 9.1 8.5 - 10.1 MG/DL Radiologic Studies -  
CT ABD PELV WO CONT Non-public Result IMPRESSION:  
  
No acute findings are identified. A few scattered diverticula without evidence  
of diverticulitis Left adnexal cystic mass not significantly changed. Parapelvic cysts of the left  
kidney. Status post cholecystectomy, hysterectomy and bowel resection. Final Result XR ABD FLAT/ ERECT    (Results Pending) CT Results  (Last 48 hours) 07/29/18 2117  CT ABD PELV WO CONT Final result Impression:  IMPRESSION:  
   
No acute findings are identified.  A few scattered diverticula without evidence  
of diverticulitis Left adnexal cystic mass not significantly changed. Parapelvic cysts of the left  
kidney. Status post cholecystectomy, hysterectomy and bowel resection. Narrative:  EXAM: CT of the abdomen and pelvis INDICATION: Left lower quadrant pain, recent the treated for diverticulitis COMPARISON: CT abdomen and pelvis for/24/2018 TECHNIQUE: Axial CT imaging of the abdomen and pelvis was performed without  
intravenous contrast. Multiplanar reformats were generated. One or more dose  
reduction techniques were used on this CT: automated exposure control,  
adjustment of the mAs and/or kVp according to patient's size, and iterative  
reconstruction techniques. The specific techniques utilized on this CT exam have  
been documented in the patient's electronic medical record. 10 cc of contrast  
were administered but the IV did not function properly and no additional IV  
contrast was given.  
   
_______________ FINDINGS:  
   
LOWER CHEST: No acute findings LIVER, BILIARY: Liver is normal. No biliary dilation. Gallbladder is surgically  
absent. PANCREAS: Normal.  
   
SPLEEN: Normal.  
   
ADRENALS: Normal.  
   
KIDNEYS: Stable. No obstructive uropathy. Parapelvic cysts again noted on the  
left. LYMPH NODES: No enlarged lymph nodes. GASTROINTESTINAL TRACT: No bowel dilation or wall thickening. GI tract  
evaluation is limited without oral contrast. No bowel obstruction. No free air. Mild scattered diverticula without current evidence of diverticulitis. Patent  
bowel anastomosis at the left lower quadrant looks similar to previous. Normal  
appendix. PELVIC ORGANS: Left adnexal region cystic mass measures 2.6 x 3.8 cm, similar to  
prior. Status post hysterectomy VASCULATURE: Unremarkable. BONES: No acute or aggressive osseous abnormalities identified.   
   
OTHER: Thinning of the rectus musculature, similar to previous. No herniation  
identified. _______________ CXR Results  (Last 48 hours) None Medical Decision Making I am the first provider for this patient. I reviewed the vital signs, available nursing notes, past medical history, past surgical history, family history and social history. Vital Signs-Reviewed the patient's vital signs. Records Reviewed: Nursing Notes and Old Medical Records Procedures: 
Procedures Provider Notes (Medical Decision Making): Impression:  Lower abdominal pain, vomiting, conjunctivitis. Patient feels much better. CT reassuring for no acute infection, SBO. Will plan on having patient follow with PCP. Patient agrees with the plan. GUILLERMO Cruz 1:46 AM 
 
 
  
MED RECONCILIATION: 
Current Facility-Administered Medications Medication Dose Route Frequency  iopamidol (ISOVUE 300) 61 % contrast injection 100 mL  100 mL IntraVENous RAD ONCE Current Outpatient Prescriptions Medication Sig  
 ondansetron (ZOFRAN ODT) 4 mg disintegrating tablet Take 1 Tab by mouth every eight (8) hours as needed for Nausea.  dicyclomine (BENTYL) 20 mg tablet Take 1 Tab by mouth every six (6) hours for 20 doses.  erythromycin (ILOTYCIN) ophthalmic ointment Apply a small ribbon of ointment to the left eye three times a day for 7 days  bisoprolol-hydroCHLOROthiazide (ZIAC) 2.5-6.25 mg per tablet Take 1 Tab by mouth daily.  polyethylene glycol (MIRALAX) 17 gram/dose powder Take 17 g by mouth two (2) times a day. 1 capful with 8 oz of water daily  senna (SENOKOT) 8.6 mg tablet Take 1 Tab by mouth daily.  metFORMIN (GLUMETZA ER) 500 mg TG24 24 hour tablet take 1 tablet by mouth twice a day  amLODIPine (NORVASC) 2.5 mg tablet take 1 tablet by mouth NIGHTLY  ALLERGY RELIEF, CETIRIZINE, 10 mg tablet take 1 tablet by mouth once daily  doxepin (SINEQUAN) 10 mg capsule Take 1 Cap by mouth nightly.  dapagliflozin (FARXIGA) 10 mg tab tablet Take 1 Tab by mouth daily. Indications: type 2 diabetes mellitus  omeprazole (PRILOSEC) 10 mg capsule Take 1 Cap by mouth daily.  albuterol (PROVENTIL HFA, VENTOLIN HFA, PROAIR HFA) 90 mcg/actuation inhaler Take 1 Puff by inhalation every six (6) hours as needed for Wheezing.  atorvastatin (LIPITOR) 40 mg tablet Take 1 Tab by mouth nightly.  glucose blood VI test strips (ONETOUCH ULTRA TEST) strip Check blood glucose as directed Disposition: 
discharged DISCHARGE NOTE:  
 
Pt has been reexamined. Patient feels better, abd pain has improved. Patient has no new complaints, changes, or physical findings. Care plan outlined and precautions discussed. Results of labs and CT were reviewed with the patient. All medications were reviewed with the patient; will d/c home with bentyl, erythromycin, zofran. All of pt's questions and concerns were addressed. Patient was instructed and agrees to follow up with PCP, as well as to return to the ED upon further deterioration. Patient is ready to go home. Follow-up Information Follow up With Details Comments Contact Info [facility] EMERGENCY DEPT  If symptoms worsen [address] [facility] 
[phone] Zander Granados MD In 3 days  [address] [id number] [phone] Discharge Medication List as of 7/29/2018 11:57 PM  
  
START taking these medications Details  
dicyclomine (BENTYL) 20 mg tablet Take 1 Tab by mouth every six (6) hours for 20 doses. , Normal, Disp-20 Tab, R-0  
  
erythromycin (ILOTYCIN) ophthalmic ointment Apply a small ribbon of ointment to the left eye three times a day for 7 days, Normal, Disp-3.5 g, R-0  
  
  
CONTINUE these medications which have CHANGED  Details  
ondansetron (ZOFRAN ODT) 4 mg disintegrating tablet Take 1 Tab by mouth every eight (8) hours as needed for Nausea., Normal, Disp-12 Tab, R-0  
  
  
CONTINUE these medications which have NOT CHANGED Details  
bisoprolol-hydroCHLOROthiazide (ZIAC) 2.5-6.25 mg per tablet Take 1 Tab by mouth daily. , Normal, Disp-90 Tab, R-3  
  
polyethylene glycol (MIRALAX) 17 gram/dose powder Take 17 g by mouth two (2) times a day. 1 capful with 8 oz of water daily, Print, Disp-119 g, R-0  
  
senna (SENOKOT) 8.6 mg tablet Take 1 Tab by mouth daily. , Normal, Disp-30 Tab, R-0  
  
metFORMIN (GLUMETZA ER) 500 mg TG24 24 hour tablet take 1 tablet by mouth twice a day, Normal, Disp-60 Tab, R-2  
  
amLODIPine (NORVASC) 2.5 mg tablet take 1 tablet by mouth NIGHTLY, Normal, Disp-90 Tab, R-1 ALLERGY RELIEF, CETIRIZINE, 10 mg tablet take 1 tablet by mouth once daily, Historical Med, R-0, PHIL  
  
doxepin (SINEQUAN) 10 mg capsule Take 1 Cap by mouth nightly., Normal, Disp-30 Cap, R-11  
  
dapagliflozin (FARXIGA) 10 mg tab tablet Take 1 Tab by mouth daily. Indications: type 2 diabetes mellitus, Normal, Disp-90 Tab, R-3  
  
omeprazole (PRILOSEC) 10 mg capsule Take 1 Cap by mouth daily. , Normal, Disp-30 Cap, R-11  
  
albuterol (PROVENTIL HFA, VENTOLIN HFA, PROAIR HFA) 90 mcg/actuation inhaler Take 1 Puff by inhalation every six (6) hours as needed for Wheezing., Normal, Disp-1 Inhaler, R-0  
  
atorvastatin (LIPITOR) 40 mg tablet Take 1 Tab by mouth nightly., Normal, Disp-90 Tab, R-3  
  
glucose blood VI test strips (ONETOUCH ULTRA TEST) strip Check blood glucose as directed, Normal, Disp-100 Strip, R-1  
  
  
STOP taking these medications  
  
 oxyCODONE-acetaminophen (PERCOCET) 5-325 mg per tablet Comments:  
Reason for Stopping:   
   
 dicyclomine (BENTYL) 10 mg capsule Comments:  
Reason for Stopping:   
   
  
 
 
Diagnosis Clinical Impression: 1. Lower abdominal pain 2. Nausea without vomiting 3. Acute conjunctivitis of left eye, unspecified acute conjunctivitis type

## 2018-07-30 ENCOUNTER — HOSPITAL ENCOUNTER (OUTPATIENT)
Dept: PHYSICAL THERAPY | Age: 58
Discharge: HOME | End: 2018-07-30
Payer: COMMERCIAL

## 2018-07-30 VITALS
WEIGHT: 150 LBS | SYSTOLIC BLOOD PRESSURE: 102 MMHG | OXYGEN SATURATION: 100 % | HEART RATE: 94 BPM | RESPIRATION RATE: 18 BRPM | TEMPERATURE: 98.6 F | DIASTOLIC BLOOD PRESSURE: 86 MMHG | BODY MASS INDEX: 25.61 KG/M2 | HEIGHT: 64 IN

## 2018-07-30 PROCEDURE — 97110 THERAPEUTIC EXERCISES: CPT

## 2018-07-30 PROCEDURE — 97140 MANUAL THERAPY 1/> REGIONS: CPT

## 2018-07-30 PROCEDURE — 97016 VASOPNEUMATIC DEVICE THERAPY: CPT

## 2018-07-30 PROCEDURE — 97116 GAIT TRAINING THERAPY: CPT

## 2018-07-30 PROCEDURE — 74011250637 HC RX REV CODE- 250/637: Performed by: PHYSICIAN ASSISTANT

## 2018-07-30 PROCEDURE — 96372 THER/PROPH/DIAG INJ SC/IM: CPT

## 2018-07-30 PROCEDURE — 74011250636 HC RX REV CODE- 250/636: Performed by: PHYSICIAN ASSISTANT

## 2018-07-30 RX ADMIN — DICYCLOMINE HYDROCHLORIDE 20 MG: 10 INJECTION INTRAMUSCULAR at 00:05

## 2018-07-30 RX ADMIN — ERYTHROMYCIN: 5 OINTMENT OPHTHALMIC at 00:05

## 2018-07-30 NOTE — PROGRESS NOTES
PT DAILY TREATMENT NOTE  Patient Name: Dragan Prakash Date:2018 : 1960 [x]  Patient  Verified Payor: Kathrine Rosas / Plan: [employer] / Product Type: PPO / In time: 3:31 pm           Out time: 4:34 pm 
Total Treatment Time (min): 63 Total Timed Codes (min): 53 
1:1 Treatment Time (min): 3:36pm-4:14pm (38) Visit #: 2 of  Treatment Area: Right knee pain [M25.561] Acute postoperative pain of knee [G89.18, M25.569] SUBJECTIVE Pain Level (0-10 scale): 10 Any medication changes, allergies to medications, adverse drug reactions, diagnosis change, or new procedure performed?: [x] No    [] Yes (see summary sheet for update) Subjective functional status/changes:   [] No changes reported \"It is just tight today. \" OBJECTIVE Modality rationale: decrease edema, decrease inflammation and decrease pain to improve the patients ability to perform ADLs, > standing tolerance Min Type Additional Details  
 [] Estim: []Att   []Unatt        []TENS instruct []IFC  []Premod   []NMES []Other:  []w/US   []w/ice   []w/heat Position: Location:  
 []  Ultrasound: []Continuous   [] Pulsed []1MHz   []3MHz Location: 
W/cm2:  
 []  Ice     []  heat 
[]  Ice massage Position: Location:  
10 [x]  Vasopneumatic Device Pressure:       [] lo [x] med [] hi  
Temperature: [x] lo [] med [] hi  
[x] Skin assessment post-treatment:  [x]intact []redness- no adverse reaction 
     []redness  adverse reaction:  
 
33 min Therapeutic Exercise:  [x] See flow sheet: initiated therex per IE Rationale: increase ROM, increase strength, improve coordination, improve balance and increase proprioception to improve the patients ability to perform ADLs 12 min Manual Therapy: (R) knee retro-massage; scar desensitization, patellar mobs grade III f/b PROM Rationale: decrease pain, increase ROM and increase tissue extensibility to improve the patient's ability to amb with a normalized gait pattern 8 min Gait Trainin feet with (L) SPC on level surfaces with SBA req VCs for inc hip flexion and AD management Rationale: improve gait quality to improve the patient's ability to amb with LRAD X min Patient Education: [x] Review HEP from IE; encouraged inc protein and water intake Other Objective/Functional Measures:  
Pt req VCs for TA draw with all therex. AROM, supine: 3-118 deg GT: pt req VCs to inc hip flexion versus circumduction, good carryover, good tolerance, ~122 feet with turning, slower samir noted SR: (R) 27 seconds, (L) 30 seconds Edema: 1.0 cm difference at 2 inches above mid-patella Pain Level (0-10 scale) post treatment: 7 ASSESSMENT/Changes in Function:  
Fair tolerance to treatment today with patient req 100% verbal/tactile cueing and demo for proper form/technique with all newly introduced therex. Patient demos decreased pain tolerance sec \"out of pain meds\" - encouraged icing, HEP and elevation to address c/o pain. Knee mobility steadily improving. Patient will continue to benefit from skilled PT services to modify and progress therapeutic interventions, address functional mobility deficits, address ROM deficits, address strength deficits, analyze and address soft tissue restrictions, analyze and cue movement patterns, analyze and modify body mechanics/ergonomics, assess and modify postural abnormalities and address imbalance/dizziness to attain remaining goals. [x]  See Plan of Care 
[]  See progress note/recertification 
[]  See Discharge Summary Progress towards goals / Updated goals: 
Short Term Goals: To be accomplished in  1  weeks: 1. Pt will be independent and compliant with HEP. -Goal met; pt notes to compliance (18) Long Term Goals: To be accomplished in  8-12  treatments: 1. Patient will increase FOTO score to 52/100 for indications of increased functional mobility. 2.  Patient will demo increased AROM knee flexion to 120 for ease with transfers. -Goal progressing; 118 deg knee flexion AROM in supine (7/30/18) 3. Patient will demo AROM knee extension to 0 deg for normalized gait. -Goal progressing; maintaining 3 deg knee extension AROM in supine, notable GSC tightness (7/30/18) 4. Patient will demo 4/5 knee extension strength for ease with stair negotiation PLAN [x]  Upgrade activities as tolerated     [x]  Continue plan of care 
[]  Update interventions per flow sheet      
[]  Discharge due to:_ 
[x]  Other: assess response to first f/u tx   
 
Oanh Vega, PTA 7/30/2018

## 2018-07-30 NOTE — DISCHARGE INSTRUCTIONS
Pinkeye: Care Instructions  Your Care Instructions    Pinkeye is redness and swelling of the eye surface and the conjunctiva (the lining of the eyelid and the covering of the white part of the eye). Pinkeye is also called conjunctivitis. Pinkeye is often caused by infection with bacteria or a virus. Dry air, allergies, smoke, and chemicals are other common causes. Pinkeye often clears on its own in 7 to 10 days. Antibiotics only help if the pinkeye is caused by bacteria. Pinkeye caused by infection spreads easily. If an allergy or chemical is causing pinkeye, it will not go away unless you can avoid whatever is causing it. Follow-up care is a key part of your treatment and safety. Be sure to make and go to all appointments, and call your doctor if you are having problems. It's also a good idea to know your test results and keep a list of the medicines you take. How can you care for yourself at home? · Wash your hands often. Always wash them before and after you treat pinkeye or touch your eyes or face. · Use moist cotton or a clean, wet cloth to remove crust. Wipe from the inside corner of the eye to the outside. Use a clean part of the cloth for each wipe. · Put cold or warm wet cloths on your eye a few times a day if the eye hurts. · Do not wear contact lenses or eye makeup until the pinkeye is gone. Throw away any eye makeup you were using when you got pinkeye. Clean your contacts and storage case. If you wear disposable contacts, use a new pair when your eye has cleared and it is safe to wear contacts again. · If the doctor gave you antibiotic ointment or eyedrops, use them as directed. Use the medicine for as long as instructed, even if your eye starts looking better soon. Keep the bottle tip clean, and do not let it touch the eye area. · To put in eyedrops or ointment:  ¨ Tilt your head back, and pull your lower eyelid down with one finger.   ¨ Drop or squirt the medicine inside the lower lid.  ¨ Close your eye for 30 to 60 seconds to let the drops or ointment move around. ¨ Do not touch the ointment or dropper tip to your eyelashes or any other surface. · Do not share towels, pillows, or washcloths while you have pinkeye. When should you call for help? Call your doctor now or seek immediate medical care if:    · You have pain in your eye, not just irritation on the surface.     · You have a change in vision or loss of vision.     · You have an increase in discharge from the eye.     · Your eye has not started to improve or begins to get worse within 48 hours after you start using antibiotics.     · Pinkeye lasts longer than 7 days.    Watch closely for changes in your health, and be sure to contact your doctor if you have any problems. Where can you learn more? Go to http://betzyBenhauerdidi.info/. Enter Y392 in the search box to learn more about \"Pinkeye: Care Instructions. \"  Current as of: November 20, 2017  Content Version: 11.7  © 2826-6968 [employer]. Care instructions adapted under license by [employer] (which disclaims liability or warranty for this information). If you have questions about a medical condition or this instruction, always ask your healthcare professional. [address] any warranty or liability for your use of this information. Abdominal Pain: Care Instructions  Your Care Instructions    Abdominal pain has many possible causes. Some aren't serious and get better on their own in a few days. Others need more testing and treatment. If your pain continues or gets worse, you need to be rechecked and may need more tests to find out what is wrong. You may need surgery to correct the problem. Don't ignore new symptoms, such as fever, nausea and vomiting, urination problems, pain that gets worse, and dizziness. These may be signs of a more serious problem.   Your doctor may have recommended a follow-up visit in the next 8 to 12 hours. If you are not getting better, you may need more tests or treatment. The doctor has checked you carefully, but problems can develop later. If you notice any problems or new symptoms, get medical treatment right away. Follow-up care is a key part of your treatment and safety. Be sure to make and go to all appointments, and call your doctor if you are having problems. It's also a good idea to know your test results and keep a list of the medicines you take. How can you care for yourself at home? · Rest until you feel better. · To prevent dehydration, drink plenty of fluids, enough so that your urine is light yellow or clear like water. Choose water and other caffeine-free clear liquids until you feel better. If you have kidney, heart, or liver disease and have to limit fluids, talk with your doctor before you increase the amount of fluids you drink. · If your stomach is upset, eat mild foods, such as rice, dry toast or crackers, bananas, and applesauce. Try eating several small meals instead of two or three large ones. · Wait until 48 hours after all symptoms have gone away before you have spicy foods, alcohol, and drinks that contain caffeine. · Do not eat foods that are high in fat. · Avoid anti-inflammatory medicines such as aspirin, ibuprofen (Advil, Motrin), and naproxen (Aleve). These can cause stomach upset. Talk to your doctor if you take daily aspirin for another health problem. When should you call for help? Call 911 anytime you think you may need emergency care.  For example, call if:    · You passed out (lost consciousness).     · You pass maroon or very bloody stools.     · You vomit blood or what looks like coffee grounds.     · You have new, severe belly pain.    Call your doctor now or seek immediate medical care if:    · Your pain gets worse, especially if it becomes focused in one area of your belly.     · You have a new or higher fever.     · Your stools are black and look like tar, or they have streaks of blood.     · You have unexpected vaginal bleeding.     · You have symptoms of a urinary tract infection. These may include:  ¨ Pain when you urinate. ¨ Urinating more often than usual.  ¨ Blood in your urine.     · You are dizzy or lightheaded, or you feel like you may faint.    Watch closely for changes in your health, and be sure to contact your doctor if:    · You are not getting better after 1 day (24 hours). Where can you learn more? Go to http://betzy-didi.info/. Enter Y000 in the search box to learn more about \"Abdominal Pain: Care Instructions. \"  Current as of: November 20, 2017  Content Version: 11.7  © 9421-2565 [employer]. Care instructions adapted under license by [email or web address] (which disclaims liability or warranty for this information). If you have questions about a medical condition or this instruction, always ask your healthcare professional. [place] [address] any warranty or liability for your use of this information.

## 2018-07-30 NOTE — ED NOTES
12:14 AM 
07/30/18 Discharge instructions given to patient (name) with verbalization of understanding. Patient accompanied by self. Patient discharged with the following prescriptions Zofran, Bentyl, Erythromycin. Patient discharged to home (destination).   
  
Junior Shawn RN

## 2018-08-01 ENCOUNTER — HOSPITAL ENCOUNTER (OUTPATIENT)
Dept: PHYSICAL THERAPY | Age: 58
End: 2018-08-01
Payer: COMMERCIAL

## 2018-08-01 ENCOUNTER — HOSPITAL ENCOUNTER (EMERGENCY)
Age: 58
Discharge: HOME | End: 2018-08-01
Attending: EMERGENCY MEDICINE | Admitting: EMERGENCY MEDICINE
Payer: COMMERCIAL

## 2018-08-01 ENCOUNTER — APPOINTMENT (OUTPATIENT)
Dept: CT IMAGING | Age: 58
End: 2018-08-01
Attending: EMERGENCY MEDICINE
Payer: COMMERCIAL

## 2018-08-01 VITALS
RESPIRATION RATE: 18 BRPM | TEMPERATURE: 98.1 F | DIASTOLIC BLOOD PRESSURE: 88 MMHG | BODY MASS INDEX: 25.75 KG/M2 | HEART RATE: 74 BPM | OXYGEN SATURATION: 100 % | WEIGHT: 150 LBS | SYSTOLIC BLOOD PRESSURE: 128 MMHG

## 2018-08-01 DIAGNOSIS — K59.00 CONSTIPATION, UNSPECIFIED CONSTIPATION TYPE: Primary | ICD-10-CM

## 2018-08-01 LAB
ALBUMIN SERPL-MCNC: 3.5 G/DL (ref 3.4–5)
ALBUMIN/GLOB SERPL: 0.9 {RATIO} (ref 0.8–1.7)
ALP SERPL-CCNC: 164 U/L (ref 45–117)
ALT SERPL-CCNC: 14 U/L (ref 13–56)
ANION GAP SERPL CALC-SCNC: 7 MMOL/L (ref 3–18)
APPEARANCE UR: CLEAR
AST SERPL-CCNC: 14 U/L (ref 15–37)
BASOPHILS # BLD: 0 K/UL (ref 0–0.1)
BASOPHILS NFR BLD: 0 % (ref 0–2)
BILIRUB SERPL-MCNC: 0.2 MG/DL (ref 0.2–1)
BILIRUB UR QL: NEGATIVE
BUN SERPL-MCNC: 17 MG/DL (ref 7–18)
BUN/CREAT SERPL: 16 (ref 12–20)
CALCIUM SERPL-MCNC: 9.8 MG/DL (ref 8.5–10.1)
CHLORIDE SERPL-SCNC: 104 MMOL/L (ref 100–108)
CO2 SERPL-SCNC: 29 MMOL/L (ref 21–32)
COLOR UR: YELLOW
CREAT SERPL-MCNC: 1.04 MG/DL (ref 0.6–1.3)
DIFFERENTIAL METHOD BLD: NORMAL
EOSINOPHIL # BLD: 0.2 K/UL (ref 0–0.4)
EOSINOPHIL NFR BLD: 2 % (ref 0–5)
ERYTHROCYTE [DISTWIDTH] IN BLOOD BY AUTOMATED COUNT: 14.3 % (ref 11.6–14.5)
GLOBULIN SER CALC-MCNC: 4 G/DL (ref 2–4)
GLUCOSE SERPL-MCNC: 134 MG/DL (ref 74–99)
GLUCOSE UR STRIP.AUTO-MCNC: >1000 MG/DL
HCT VFR BLD AUTO: 39.1 % (ref 35–45)
HGB BLD-MCNC: 12.7 G/DL (ref 12–16)
HGB UR QL STRIP: NEGATIVE
KETONES UR QL STRIP.AUTO: NEGATIVE MG/DL
LEUKOCYTE ESTERASE UR QL STRIP.AUTO: NEGATIVE
LIPASE SERPL-CCNC: 96 U/L (ref 73–393)
LYMPHOCYTES # BLD: 1.6 K/UL (ref 0.9–3.6)
LYMPHOCYTES NFR BLD: 22 % (ref 21–52)
MCH RBC QN AUTO: 28.3 PG (ref 24–34)
MCHC RBC AUTO-ENTMCNC: 32.5 G/DL (ref 31–37)
MCV RBC AUTO: 87.3 FL (ref 74–97)
MONOCYTES # BLD: 0.5 K/UL (ref 0.05–1.2)
MONOCYTES NFR BLD: 7 % (ref 3–10)
NEUTS SEG # BLD: 5.3 K/UL (ref 1.8–8)
NEUTS SEG NFR BLD: 69 % (ref 40–73)
NITRITE UR QL STRIP.AUTO: NEGATIVE
PH UR STRIP: 5 [PH] (ref 5–8)
PLATELET # BLD AUTO: 283 K/UL (ref 135–420)
PMV BLD AUTO: 10.5 FL (ref 9.2–11.8)
POTASSIUM SERPL-SCNC: 4 MMOL/L (ref 3.5–5.5)
PROT SERPL-MCNC: 7.5 G/DL (ref 6.4–8.2)
PROT UR STRIP-MCNC: NEGATIVE MG/DL
RBC # BLD AUTO: 4.48 M/UL (ref 4.2–5.3)
SODIUM SERPL-SCNC: 140 MMOL/L (ref 136–145)
SP GR UR REFRACTOMETRY: >1.03 (ref 1–1.03)
UROBILINOGEN UR QL STRIP.AUTO: 0.2 EU/DL (ref 0.2–1)
WBC # BLD AUTO: 7.6 K/UL (ref 4.6–13.2)

## 2018-08-01 PROCEDURE — 83690 ASSAY OF LIPASE: CPT

## 2018-08-01 PROCEDURE — 99284 EMERGENCY DEPT VISIT MOD MDM: CPT

## 2018-08-01 PROCEDURE — 96375 TX/PRO/DX INJ NEW DRUG ADDON: CPT

## 2018-08-01 PROCEDURE — 74011250636 HC RX REV CODE- 250/636: Performed by: EMERGENCY MEDICINE

## 2018-08-01 PROCEDURE — 74011250636 HC RX REV CODE- 250/636: Performed by: NURSE PRACTITIONER

## 2018-08-01 PROCEDURE — 96374 THER/PROPH/DIAG INJ IV PUSH: CPT

## 2018-08-01 PROCEDURE — 74011636320 HC RX REV CODE- 636/320: Performed by: EMERGENCY MEDICINE

## 2018-08-01 PROCEDURE — 74177 CT ABD & PELVIS W/CONTRAST: CPT

## 2018-08-01 PROCEDURE — 85025 COMPLETE CBC W/AUTO DIFF WBC: CPT | Performed by: NURSE PRACTITIONER

## 2018-08-01 PROCEDURE — 80053 COMPREHEN METABOLIC PANEL: CPT | Performed by: NURSE PRACTITIONER

## 2018-08-01 PROCEDURE — 96361 HYDRATE IV INFUSION ADD-ON: CPT

## 2018-08-01 PROCEDURE — 81003 URINALYSIS AUTO W/O SCOPE: CPT | Performed by: NURSE PRACTITIONER

## 2018-08-01 RX ORDER — MORPHINE SULFATE 4 MG/ML
4 INJECTION INTRAVENOUS
Status: COMPLETED | OUTPATIENT
Start: 2018-08-01 | End: 2018-08-01

## 2018-08-01 RX ORDER — MAGNESIUM CITRATE
SOLUTION, ORAL ORAL
Qty: 1 BOTTLE | Refills: 0 | Status: SHIPPED | OUTPATIENT
Start: 2018-08-01 | End: 2018-12-26

## 2018-08-01 RX ORDER — ONDANSETRON 2 MG/ML
4 INJECTION INTRAMUSCULAR; INTRAVENOUS
Status: COMPLETED | OUTPATIENT
Start: 2018-08-01 | End: 2018-08-01

## 2018-08-01 RX ADMIN — ONDANSETRON 4 MG: 2 INJECTION, SOLUTION INTRAMUSCULAR; INTRAVENOUS at 16:41

## 2018-08-01 RX ADMIN — SODIUM CHLORIDE 1000 ML: 900 INJECTION, SOLUTION INTRAVENOUS at 15:56

## 2018-08-01 RX ADMIN — IOPAMIDOL 93 ML: 612 INJECTION, SOLUTION INTRAVENOUS at 17:11

## 2018-08-01 RX ADMIN — MORPHINE SULFATE 4 MG: 4 INJECTION INTRAVENOUS at 16:41

## 2018-08-01 NOTE — ED PROVIDER NOTES
EMERGENCY DEPARTMENT HISTORY AND PHYSICAL EXAM 
 
15:40 Date: 8/1/2018 Patient Name: Cindy Dumont History of Presenting Illness Chief Complaint Patient presents with  Abdominal Pain  Vomiting History Provided By: Patient Chief Complaint: Abd pain Duration: 4 Days Timing:  acute on chronic Location: R sided Quality: Dominique Silva Severity: 10 out of 10 Modifying Factors: improved by fetal position Associated Symptoms: N/V/D, chills, frequency Additional History (Context): Cindy Dumont is a 62 y.o. female with diabetes, hypertension, GERD, abdominal adhesions who presents with acute on chronic severe R sided abd pain x4 days. Associated sx include chills, frequency, N/V/D x1 today. Denies blood in stool, hematemesis. No recent oral Abx. Given bentyl, able to tolerate it, denies improvement with it. Hx multiple abd surgeries. PCP: Glo Waters MD 
 
Current Outpatient Prescriptions Medication Sig Dispense Refill  magnesium citrate solution Take 1/3 of bottle every 8 hours until finished or until you have a bowel movement. 1 Bottle 0  
 ondansetron (ZOFRAN ODT) 4 mg disintegrating tablet Take 1 Tab by mouth every eight (8) hours as needed for Nausea. 12 Tab 0  
 dicyclomine (BENTYL) 20 mg tablet Take 1 Tab by mouth every six (6) hours for 20 doses. 20 Tab 0  
 erythromycin (ILOTYCIN) ophthalmic ointment Apply a small ribbon of ointment to the left eye three times a day for 7 days 3.5 g 0  
 bisoprolol-hydroCHLOROthiazide (ZIAC) 2.5-6.25 mg per tablet Take 1 Tab by mouth daily. 90 Tab 3  polyethylene glycol (MIRALAX) 17 gram/dose powder Take 17 g by mouth two (2) times a day. 1 capful with 8 oz of water daily 119 g 0  
 senna (SENOKOT) 8.6 mg tablet Take 1 Tab by mouth daily.  30 Tab 0  
 metFORMIN (GLUMETZA ER) 500 mg TG24 24 hour tablet take 1 tablet by mouth twice a day 60 Tab 2  
 amLODIPine (NORVASC) 2.5 mg tablet take 1 tablet by mouth NIGHTLY 90 Tab 1  ALLERGY RELIEF, CETIRIZINE, 10 mg tablet take 1 tablet by mouth once daily  0  
 doxepin (SINEQUAN) 10 mg capsule Take 1 Cap by mouth nightly. 30 Cap 11  
 dapagliflozin (FARXIGA) 10 mg tab tablet Take 1 Tab by mouth daily. Indications: type 2 diabetes mellitus 90 Tab 3  
 omeprazole (PRILOSEC) 10 mg capsule Take 1 Cap by mouth daily. 30 Cap 11  
 albuterol (PROVENTIL HFA, VENTOLIN HFA, PROAIR HFA) 90 mcg/actuation inhaler Take 1 Puff by inhalation every six (6) hours as needed for Wheezing. 1 Inhaler 0  
 atorvastatin (LIPITOR) 40 mg tablet Take 1 Tab by mouth nightly. 90 Tab 3  
 glucose blood VI test strips (ONETOUCH ULTRA TEST) strip Check blood glucose as directed 100 Strip 1 Past History Past Medical History: 
Past Medical History:  
Diagnosis Date  Abdominal adhesions  Anemia  Arthritis   
 all over the body  Asthma  Cocaine abuse  Diabetes mellitus  Dyskinesia   
 bilateral  
 Essential hypertension  GERD (gastroesophageal reflux disease)  Hypertension  Menopause  Microhematuria  Stool color black Past Surgical History: 
Past Surgical History:  
Procedure Laterality Date  HX CHOLECYSTECTOMY  6/28/10  
 HX COLONOSCOPY    
 HX ENDOSCOPY    
 HX HERNIA REPAIR    
 HX HYSTERECTOMY Fibroids  HX KNEE REPLACEMENT Left  HX OOPHORECTOMY  12/2000  HX ORTHOPAEDIC Right   
 ankle- multiple surgeries  HX SMALL BOWEL RESECTION  12/2000  HX SMALL BOWEL RESECTION  02/21/2017 Dr. Mary Vann [address] [facility] Family History: 
Family History Problem Relation Age of Onset  Hypertension Other   
  parent  Breast Cancer Other 21  
 Heart Disease Father  Hypertension Father  Diabetes Father  Diabetes Mother  Hypertension Other   
  sibling  Diabetes Other   
  parent  Diabetes Sister  Kidney Disease Brother  Diabetes Brother Social History: 
Social History Substance Use Topics  Smoking status: Never Smoker  Smokeless tobacco: Never Used  Alcohol use No  
 
 
Allergies: Allergies Allergen Reactions  Macrodantin [Nitrofurantoin Macrocrystalline] Itching and Other (comments)  Tape [Adhesive] Other (comments) Paper tape-- feels like burning skin Burned skin when had wound vac Review of Systems Review of Systems Constitutional: Positive for chills. HENT: Negative for congestion and sore throat. Respiratory: Negative for cough and shortness of breath. Cardiovascular: Negative for chest pain. Gastrointestinal: Positive for abdominal pain, diarrhea, nausea and vomiting. Negative for blood in stool. Genitourinary: Negative for dysuria. Musculoskeletal: Negative for back pain. Skin: Negative for rash. Neurological: Negative for dizziness and headaches. All other systems reviewed and are negative. Physical Exam  
 
Visit Vitals  /88 (BP 1 Location: Left arm, BP Patient Position: At rest)  Pulse 74  Temp 98.1 °F (36.7 °C)  Resp 18  Wt 68 kg (150 lb)  SpO2 100%  BMI 25.75 kg/m2 Physical Exam 
General Exam: Patient is a well developed and well nourished in no distress. Patient does not appear acutely ill or toxic. Eye Exam: Lids and conjunctiva are normal 
ENT Exam: The general head and facial exam is normal.  The neck is supple without meningeal signs. No significant adenopathy. Pulmonary Exam: No respiratory distress. The respiratory rate is normal.  No stridor. The breath sounds are equal bilaterally. There are no wheezes, rales, or rhonchi noted. Cardiac Exam: The cardiac rate and rhythm are normal.  No significant murmurs, rubs, or gallops. The peripheral pulses are normal. 
Abdominal Exam: Abdomen is soft and non-distended. No pulsatile masses. There is mild generalized tenderness. There is no rebound or guarding noted.  
Skin and Soft Tissue: The skin is warm and dry, without significant abnormality. Good color. Musculoskeletal Exam: No peripheral edema. The musculoskeletal exam of the lower extremities is normal without significant local tenderness. Psychiatric: Normal adult with appropriate demeanor and interpersonal interaction. Is oriented to person, place, and time. Diagnostic Study Results Labs - Recent Results (from the past 12 hour(s)) CBC WITH AUTOMATED DIFF Collection Time: 08/01/18  3:50 PM  
Result Value Ref Range WBC 7.6 4.6 - 13.2 K/uL  
 RBC 4.48 4.20 - 5.30 M/uL  
 HGB 12.7 12.0 - 16.0 g/dL HCT 39.1 35.0 - 45.0 % MCV 87.3 74.0 - 97.0 FL  
 MCH 28.3 24.0 - 34.0 PG  
 MCHC 32.5 31.0 - 37.0 g/dL  
 RDW 14.3 11.6 - 14.5 % PLATELET 559 086 - 900 K/uL MPV 10.5 9.2 - 11.8 FL  
 NEUTROPHILS 69 40 - 73 % LYMPHOCYTES 22 21 - 52 % MONOCYTES 7 3 - 10 % EOSINOPHILS 2 0 - 5 % BASOPHILS 0 0 - 2 %  
 ABS. NEUTROPHILS 5.3 1.8 - 8.0 K/UL  
 ABS. LYMPHOCYTES 1.6 0.9 - 3.6 K/UL  
 ABS. MONOCYTES 0.5 0.05 - 1.2 K/UL  
 ABS. EOSINOPHILS 0.2 0.0 - 0.4 K/UL  
 ABS. BASOPHILS 0.0 0.0 - 0.1 K/UL  
 DF AUTOMATED METABOLIC PANEL, COMPREHENSIVE Collection Time: 08/01/18  3:50 PM  
Result Value Ref Range Sodium 140 136 - 145 mmol/L Potassium 4.0 3.5 - 5.5 mmol/L Chloride 104 100 - 108 mmol/L  
 CO2 29 21 - 32 mmol/L Anion gap 7 3.0 - 18 mmol/L Glucose 134 (H) 74 - 99 mg/dL BUN 17 7.0 - 18 MG/DL Creatinine 1.04 0.6 - 1.3 MG/DL  
 BUN/Creatinine ratio 16 12 - 20 GFR est AA >60 >60 ml/min/1.73m2 GFR est non-AA 54 (L) >60 ml/min/1.73m2 Calcium 9.8 8.5 - 10.1 MG/DL Bilirubin, total 0.2 0.2 - 1.0 MG/DL  
 ALT (SGPT) 14 13 - 56 U/L  
 AST (SGOT) 14 (L) 15 - 37 U/L Alk. phosphatase 164 (H) 45 - 117 U/L Protein, total 7.5 6.4 - 8.2 g/dL Albumin 3.5 3.4 - 5.0 g/dL Globulin 4.0 2.0 - 4.0 g/dL A-G Ratio 0.9 0.8 - 1.7 LIPASE  Collection Time: 08/01/18  3:50 PM Result Value Ref Range Lipase 96 73 - 393 U/L  
URINALYSIS W/ RFLX MICROSCOPIC Collection Time: 08/01/18  5:35 PM  
Result Value Ref Range Color YELLOW Appearance CLEAR Specific gravity >1.030 (H) 1.005 - 1.030  
 pH (UA) 5.0 5.0 - 8.0 Protein NEGATIVE  NEG mg/dL Glucose >1000 (A) NEG mg/dL Ketone NEGATIVE  NEG mg/dL Bilirubin NEGATIVE  NEG Blood NEGATIVE  NEG Urobilinogen 0.2 0.2 - 1.0 EU/dL Nitrites NEGATIVE  NEG Leukocyte Esterase NEGATIVE  NEG Radiologic Studies -  
CT ABD PELV W CONT Final Result Ct Abd Pelv W Cont Result Date: 8/1/2018 EXAM: CT of the abdomen and pelvis INDICATION: Abdominal pain. COMPARISON: 6 7/29/2018. TECHNIQUE: Axial CT imaging of the abdomen and pelvis was performed with intravenous contrast. Multiplanar reformats were generated. One or more dose reduction techniques were used on this CT: automated exposure control, adjustment of the mAs and/or kVp according to patient's size, and iterative reconstruction techniques. The specific techniques utilized on this CT exam have been documented in the patient's electronic medical record. _______________ FINDINGS: LOWER CHEST: Unremarkable. LIVER, BILIARY: Liver is normal. No biliary dilation. Gallbladder is unremarkable. PANCREAS: Normal. SPLEEN: Normal. ADRENALS: Normal. KIDNEYS/URETERS/BLADDER: Normal. PELVIC ORGANS: Left ovarian probable cyst again noted measuring 4.2 x 2.8 cm. No surrounding inflammatory change. Lorenzo Plough VASCULATURE: Unremarkable LYMPH NODES: No enlarged lymph nodes. GASTROINTESTINAL TRACT: No bowel dilation or wall thickening. A few scattered colonic diverticula are present without focal inflammatory change identified. BONES: No acute or aggressive osseous abnormalities identified. OTHER: None. _______________ IMPRESSION: Large amount stool in the colon suggestive of constipation.  No acute intra-abdominal pathology identified or significant interval change otherwise Medical Decision Making I am the first provider for this patient. I reviewed the vital signs, available nursing notes, past medical history, past surgical history, family history and social history. Vital Signs-Reviewed the patient's vital signs. Pulse Oximetry Analysis -  100% on room air (Interpretation) wnl 
 
Cardiac Monitor: 
Rate: 82 Records Reviewed: Nursing Notes (Time of Review: 3:36 PM) 
 
ED Course: Progress Notes, Reevaluation, and Consults: 
 
17:58 Re-evaluated pt, feeling better. Provider Notes (Medical Decision Making): Pt with abd pain, seen 2 days ago for same. CT at that time was without contrast. Labs reassuring. Exam with mild RLQ tenderness. Doubt pelvic pathology based on exam. CT repeated with IV contrast. Suggests constipation. Pt feeling better in ED. Counseled on treatment plan, need for follow up with PCP (Appt on Monday), and return precautions. Diagnosis Clinical Impression: 1. Constipation, unspecified constipation type Disposition: Discharge Follow-up Information Follow up With Details Comments Contact Info Janice Peng MD In 3 days  [address] [place] [id number] [phone] [facility] EMERGENCY DEPT  If symptoms worsen [address] 
[phone] Patient's Medications Start Taking MAGNESIUM CITRATE SOLUTION    Take 1/3 of bottle every 8 hours until finished or until you have a bowel movement. Continue Taking ALBUTEROL (PROVENTIL HFA, VENTOLIN HFA, PROAIR HFA) 90 MCG/ACTUATION INHALER    Take 1 Puff by inhalation every six (6) hours as needed for Wheezing. ALLERGY RELIEF, CETIRIZINE, 10 MG TABLET    take 1 tablet by mouth once daily AMLODIPINE (NORVASC) 2.5 MG TABLET    take 1 tablet by mouth NIGHTLY  
 ATORVASTATIN (LIPITOR) 40 MG TABLET    Take 1 Tab by mouth nightly.   
 BISOPROLOL-HYDROCHLOROTHIAZIDE [facility]) 2. 5-6.25 MG PER TABLET    Take 1 Tab by mouth daily. DAPAGLIFLOZIN (FARXIGA) 10 MG TAB TABLET    Take 1 Tab by mouth daily. Indications: type 2 diabetes mellitus DICYCLOMINE (BENTYL) 20 MG TABLET    Take 1 Tab by mouth every six (6) hours for 20 doses. DOXEPIN (SINEQUAN) 10 MG CAPSULE    Take 1 Cap by mouth nightly. ERYTHROMYCIN (ILOTYCIN) OPHTHALMIC OINTMENT    Apply a small ribbon of ointment to the left eye three times a day for 7 days GLUCOSE BLOOD VI TEST STRIPS (ONETOUCH ULTRA TEST) STRIP    Check blood glucose as directed METFORMIN (GLUMETZA ER) 500 MG TG24 24 HOUR TABLET    take 1 tablet by mouth twice a day OMEPRAZOLE (PRILOSEC) 10 MG CAPSULE    Take 1 Cap by mouth daily. ONDANSETRON (ZOFRAN ODT) 4 MG DISINTEGRATING TABLET    Take 1 Tab by mouth every eight (8) hours as needed for Nausea. POLYETHYLENE GLYCOL (MIRALAX) 17 GRAM/DOSE POWDER    Take 17 g by mouth two (2) times a day. 1 capful with 8 oz of water daily SENNA (SENOKOT) 8.6 MG TABLET    Take 1 Tab by mouth daily. These Medications have changed No medications on file Stop Taking No medications on file  
 
_______________________________ Attestations: 
Scribe Attestation Bairon Osorio acting as a scribe for and in the presence of Jordan Amador MD     
August 01, 2018 at 5:58 PM 
    
Provider Attestation:     
I personally performed the services described in the documentation, reviewed the documentation, as recorded by the scribe in my presence, and it accurately and completely records my words and actions. August 01, 2018 at 5:58 PM - Jordan Amador MD   
_______________________________

## 2018-08-01 NOTE — ED NOTES
I performed a brief evaluation, including history and physical, of the patient here in triage and I have determined that pt will need further treatment and evaluation from the main side ER physician. I have placed initial orders to help in expediting patients care. August 01, 2018 at 3:07 PM - Stoney Landaverde NP Visit Vitals  BP (!) 131/91 (BP 1 Location: Right arm, BP Patient Position: At rest)  Pulse 82  Temp 97.3 °F (36.3 °C)  Resp 16  SpO2 100%

## 2018-08-01 NOTE — DISCHARGE INSTRUCTIONS
Constipation: Care Instructions  Your Care Instructions    Constipation means that you have a hard time passing stools (bowel movements). People pass stools from 3 times a day to once every 3 days. What is normal for you may be different. Constipation may occur with pain in the rectum and cramping. The pain may get worse when you try to pass stools. Sometimes there are small amounts of bright red blood on toilet paper or the surface of stools. This is because of enlarged veins near the rectum (hemorrhoids). A few changes in your diet and lifestyle may help you avoid ongoing constipation. Your doctor may also prescribe medicine to help loosen your stool. Some medicines can cause constipation. These include pain medicines and antidepressants. Tell your doctor about all the medicines you take. Your doctor may want to make a medicine change to ease your symptoms. Follow-up care is a key part of your treatment and safety. Be sure to make and go to all appointments, and call your doctor if you are having problems. It's also a good idea to know your test results and keep a list of the medicines you take. How can you care for yourself at home? · Drink plenty of fluids, enough so that your urine is light yellow or clear like water. If you have kidney, heart, or liver disease and have to limit fluids, talk with your doctor before you increase the amount of fluids you drink. · Include high-fiber foods in your diet each day. These include fruits, vegetables, beans, and whole grains. · Get at least 30 minutes of exercise on most days of the week. Walking is a good choice. You also may want to do other activities, such as running, swimming, cycling, or playing tennis or team sports. · Take a fiber supplement, such as Citrucel or Metamucil, every day. Read and follow all instructions on the label. · Schedule time each day for a bowel movement. A daily routine may help.  Take your time having your bowel movement. · Support your feet with a small step stool when you sit on the toilet. This helps flex your hips and places your pelvis in a squatting position. · Your doctor may recommend an over-the-counter laxative to relieve your constipation. Examples are Milk of Magnesia and MiraLax. Read and follow all instructions on the label. Do not use laxatives on a long-term basis. When should you call for help? Call your doctor now or seek immediate medical care if:    · You have new or worse belly pain.     · You have new or worse nausea or vomiting.     · You have blood in your stools.    Watch closely for changes in your health, and be sure to contact your doctor if:    · Your constipation is getting worse.     · You do not get better as expected. Where can you learn more? Go to http://betzy-didi.info/. Enter [phone] in the search box to learn more about \"Constipation: Care Instructions. \"  Current as of: November 20, 2017  Content Version: 11.7  © 0042-5223 [employer]. Care instructions adapted under license by [employer] (which disclaims liability or warranty for this information). If you have questions about a medical condition or this instruction, always ask your healthcare professional. [address] any warranty or liability for your use of this information.

## 2018-08-01 NOTE — ED TRIAGE NOTES
Pt arrived through triage with c/o abdominal pain and N/V/D since Sunday. Seen on Sunday for the same.

## 2018-08-03 ENCOUNTER — HOSPITAL ENCOUNTER (OUTPATIENT)
Dept: PHYSICAL THERAPY | Age: 58
Discharge: HOME | End: 2018-08-03
Payer: COMMERCIAL

## 2018-08-03 PROCEDURE — 97016 VASOPNEUMATIC DEVICE THERAPY: CPT

## 2018-08-03 PROCEDURE — 97110 THERAPEUTIC EXERCISES: CPT

## 2018-08-03 PROCEDURE — 97140 MANUAL THERAPY 1/> REGIONS: CPT

## 2018-08-03 RX ORDER — METFORMIN HYDROCHLORIDE 500 MG/1
TABLET, FILM COATED, EXTENDED RELEASE ORAL
Qty: 60 TAB | Refills: 2 | Status: SHIPPED | OUTPATIENT
Start: 2018-08-03 | End: 2018-11-06 | Stop reason: SDUPTHER

## 2018-08-03 NOTE — PROGRESS NOTES
PT DAILY TREATMENT NOTE  Patient Name: Emile Schlatter Date:8/3/2018 : 1960 [x]  Patient  Verified Payor: Mali Gamez / Plan: [place] [employer] [facility] / Product Type: PPO / In time: 11:03 am           Out time: 12:00 pm 
Total Treatment Time (min): 57 Total Timed Codes (min): 47 
1:1 Treatment Time (min): 42 Visit #: 3 of  Treatment Area: Right knee pain [M25.561] Acute postoperative pain of knee [G89.18, M25.569] SUBJECTIVE Pain Level (0-10 scale): 7 Any medication changes, allergies to medications, adverse drug reactions, diagnosis change, or new procedure performed?: [x] No    [] Yes (see summary sheet for update) Subjective functional status/changes:   [] No changes reported \"It is just tight today. \" OBJECTIVE Modality rationale: decrease edema, decrease inflammation and decrease pain to improve the patients ability to perform ADLs, > standing tolerance Min Type Additional Details  
 [] Estim: []Att   []Unatt        []TENS instruct []IFC  []Premod   []NMES []Other:  []w/US   []w/ice   []w/heat Position: Location:  
 []  Ultrasound: []Continuous   [] Pulsed []1MHz   []3MHz Location: 
W/cm2:  
 []  Ice     []  heat 
[]  Ice massage Position: Location:  
10 [x]  Vasopneumatic Device Pressure:       [] lo [x] med [] hi  
Temperature: [x] lo [] med [] hi  
[x] Skin assessment post-treatment:  [x]intact []redness- no adverse reaction 
     []redness  adverse reaction:  
 
35 min Therapeutic Exercise:  [x] See flow sheet: added bridges Rationale: increase ROM, increase strength, improve coordination, improve balance and increase proprioception to improve the patients ability to perform ADLs 12 min Manual Therapy: (R) knee retro-massage; scar desensitization, patellar mobs grade III f/b PROM Rationale: decrease pain, increase ROM and increase tissue extensibility to improve the patient's ability to amb with a normalized gait pattern 
 
0 min Gait Training:  NT  
Rationale: improve gait quality to improve the patient's ability to amb with LRAD X min Patient Education: [x] Review HEP - added heel slides, tandem stance, and bridges Other Objective/Functional Measures:  
Stiffness noted today. HEP compliance: fair (R) knee AROM: 0-113 deg LAQ lacking approximately 5 deg TKE. Pain Level (0-10 scale) post treatment: 7 ASSESSMENT/Changes in Function:  
Patient cont's to make steady progress towards goals. Min decrease in knee mobility limited by edema and pain today; notable firm-end feel into flexion. Patient will continue to benefit from skilled PT services to modify and progress therapeutic interventions, address functional mobility deficits, address ROM deficits, address strength deficits, analyze and address soft tissue restrictions, analyze and cue movement patterns, analyze and modify body mechanics/ergonomics, assess and modify postural abnormalities and address imbalance/dizziness to attain remaining goals. [x]  See Plan of Care 
[]  See progress note/recertification 
[]  See Discharge Summary Progress towards goals / Updated goals: 
Short Term Goals: To be accomplished in  1  weeks: 1. Pt will be independent and compliant with HEP. -Goal met; pt notes to compliance (7/30/18) Long Term Goals: To be accomplished in  8-12  treatments: 1. Patient will increase FOTO score to 52/100 for indications of increased functional mobility. 2.  Patient will demo increased AROM knee flexion to 120 for ease with transfers. -Goal progressing; 118 deg knee flexion AROM in supine (7/30/18) - reduced to 113 deg today (8/3/18) 3. Patient will demo AROM knee extension to 0 deg for normalized gait. -Goal met; achieved 0 deg knee extension (8/3/18) 4. Patient will demo 4/5 knee extension strength for ease with stair negotiation. -Goal progressing; 3-/5 knee ext strength (8/3/18) PLAN [x] Upgrade activities as tolerated     [x]  Continue plan of care 
[]  Update interventions per flow sheet      
[]  Discharge due to:_ 
[x]  Other: add SLR x 3, heel slides, extension faye Maxwell, PTA 8/3/2018

## 2018-08-06 ENCOUNTER — OFFICE VISIT (OUTPATIENT)
Dept: FAMILY MEDICINE CLINIC | Age: 58
End: 2018-08-06

## 2018-08-06 ENCOUNTER — HOSPITAL ENCOUNTER (OUTPATIENT)
Dept: PHYSICAL THERAPY | Age: 58
Discharge: HOME | End: 2018-08-06
Payer: COMMERCIAL

## 2018-08-06 DIAGNOSIS — E11.65 TYPE 2 DIABETES MELLITUS WITH HYPERGLYCEMIA, WITHOUT LONG-TERM CURRENT USE OF INSULIN (HCC): ICD-10-CM

## 2018-08-06 DIAGNOSIS — G56.03 BILATERAL CARPAL TUNNEL SYNDROME: Primary | ICD-10-CM

## 2018-08-06 DIAGNOSIS — R22.42 MASS OF LEFT LOWER EXTREMITY: ICD-10-CM

## 2018-08-06 DIAGNOSIS — G89.18 POST-OP PAIN: ICD-10-CM

## 2018-08-06 PROCEDURE — 97016 VASOPNEUMATIC DEVICE THERAPY: CPT

## 2018-08-06 PROCEDURE — 97140 MANUAL THERAPY 1/> REGIONS: CPT

## 2018-08-06 PROCEDURE — 97116 GAIT TRAINING THERAPY: CPT

## 2018-08-06 RX ORDER — TRAMADOL HYDROCHLORIDE 50 MG/1
50 TABLET ORAL
Qty: 12 TAB | Refills: 0 | Status: SHIPPED | OUTPATIENT
Start: 2018-08-06 | End: 2018-09-07 | Stop reason: SDUPTHER

## 2018-08-06 RX ORDER — TRIAMCINOLONE ACETONIDE 40 MG/ML
40 INJECTION, SUSPENSION INTRA-ARTICULAR; INTRAMUSCULAR ONCE
Qty: 1 ML | Refills: 0
Start: 2018-08-06 | End: 2018-08-06

## 2018-08-06 NOTE — PROGRESS NOTES
PT DAILY TREATMENT NOTE     Patient Name: Isabel Salvador  Date:2018  : 1960  [x]  Patient  Verified  Payor: Tiffany Pereyra / [place]: [place] [employer] [facility] / Product Type: PPO /    In time:10:50  Out time:12:00  Total Treatment Time (min): 70  Total Timed Codes (min): 60  1:1 Treatment Time (min): 10:50 to 11:25 (35)    Visit #: 4 of     Treatment Area: Right knee pain [M25.561]  Acute postoperative pain of knee [G89.18, M25.569]    SUBJECTIVE  Pain Level (0-10 scale): 3 \"sore\"  Any medication changes, allergies to medications, adverse drug reactions, diagnosis change, or new procedure performed?: [x] No    [] Yes (see summary sheet for update)  Subjective functional status/changes:   [] No changes reported  Pt 20 minutes late for Rx. Walking to the corner and back at home with RW all the time. Starting with a cane today.      OBJECTIVE  Modality rationale: decrease edema, decrease inflammation and decrease pain to improve the patients ability to improve gait, stairs    Min Type Additional Details    [] Estim: []Att   []Unatt        []TENS instruct                  []IFC  []Premod   []NMES                     []Other:  []w/US   []w/ice   []w/heat  Position:  Location:    []  Traction: [] Cervical       []Lumbar                       [] Prone          []Supine                       []Intermittent   []Continuous Lbs:  [] before manual  [] after manual    []  Ultrasound: []Continuous   [] Pulsed                           []1MHz   []3MHz Location:  W/cm2:    []  Iontophoresis with dexamethasone         Location: [] Take home patch   [] In clinic    []  Ice     []  heat  []  Ice massage Position:  Location:   10 [x]  Vasopneumatic Device Pressure:       [] lo [x] med [] hi   Temperature: [x] lo [] med [] hi   [x] Skin assessment post-treatment:  [x]intact []redness- no adverse reaction       []redness  adverse reaction:       42 (not billed) min Therapeutic Exercise:  [x] See flow sheet : added step knee flexion and SLR x3 with QS. TC for bridge and strap heel slides. Rationale: increase ROM, increase strength, improve coordination and improve balance to improve the patients ability to improve gait, mobility, return to work     10 min Manual Therapy:  Scar massge, grade IV patellar glides, PROM, retro massage, gastroc stretch    Rationale: decrease pain, increase ROM, increase tissue extensibility and decrease edema  to improve gait, stairs     8  min Gait Training:  ___ feet with ___ device on level surfaces with ___ level of assist   Rationale: SPC gait training in the clinic 4x40'. Hurdles leading with L and leading with R 2xeach; cane fitting            x min Patient Education: [x] Review HEP   Gait with SPC at home, increasing time each day this week. Use RW for long-community distances or new locations     Compliance with knee flexion. Other Objective/Functional Measures:   AAROM R knee  To 108; 111 at end of session     Pain Level (0-10 scale) post treatment: 0    ASSESSMENT/Changes in Function: Stiffness in the R knee today with a decrease in flexion ROM to 108 after MT, 111 at end of session. Good scar mobility in central portion, TTP in the patellar tendon. No edema noted on visual inspection. Good progress with gait on [facility] today with no pain and almost fully normalized; VC required for erect posture. Can achieve sufficient knee flexion and SLS on the R to accomplish hurdles. Patient will continue to benefit from skilled PT services to modify and progress therapeutic interventions, address functional mobility deficits, address ROM deficits, address strength deficits, analyze and address soft tissue restrictions, analyze and cue movement patterns, analyze and modify body mechanics/ergonomics, assess and modify postural abnormalities, address imbalance/dizziness and instruct in home and community integration to attain remaining goals.      []  See Plan of Care  []  See progress note/recertification  []  See Discharge Summary         Progress towards goals / Updated goals:  Short Term Goals: To be accomplished in  1  weeks:  1.  Pt will be independent and compliant with HEP. -Goal met; pt notes to compliance (7/30/18)  Long Term Goals: To be accomplished in  8-12  treatments:  1.  Patient will increase FOTO score to 52/100 for indications of increased functional mobility.    2.  Patient will demo increased AROM knee flexion to 120 for ease with transfers. -Goal progressing; 118 deg knee flexion AROM in supine (7/30/18) - reduced to 111 deg today (8/6/18)  3. Patient will demo AROM knee extension to 0 deg for normalized gait. -Goal met; achieved 0 deg knee extension (8/3/18)  4. Patient will demo 4/5 knee extension strength for ease with stair negotiation. -Goal progressing; 3-/5 knee ext strength (8/3/18); progressing TE with no limitations (8/6/18)  PLAN  [x]  Upgrade activities as tolerated     []  Continue plan of care  []  Update interventions per flow sheet       []  Discharge due to:_  [x]  Other:_  Con't to push knee flexion to regain 118 and then push further. ASsess gait on SPC.    Benny Jeronimo, PT 8/6/2018  7:48 AM

## 2018-08-06 NOTE — PROGRESS NOTES
1. Have you been to the ER, urgent care clinic since your last visit? Hospitalized since your last visit? ED 8/1/18 for vomiting    2. Have you seen or consulted any other health care providers outside of the [address] [place] [address] since your last visit? Include any pap smears or colon screening.    NO

## 2018-08-06 NOTE — PROGRESS NOTES
Lester Saldivar is a 62 y.o.  female and presents with    Chief Complaint   Patient presents with    Surgical Follow-up    Skin Problem     Subjective:  Ms. Chico Gallardo presents for f/u s/p right knee replacement; she had post op infection but has cleared and is doing well with physical therapy. She c/o left posterior knee lump 8/10 which is constant    Bilateral wrist pain which has been present for several months; wakes pt at night; she has braces for right but not for left. She has not been evaluated by orthopedic surgeon in the past for the wrist pain. Cardiovascular Review:  The patient has diabetes, hypertension and hyperlipidemia. Diet and Lifestyle: generally follows a low fat low cholesterol diet, generally follows a low sodium diet, follows a diabetic diet regularly, exercises sporadically, nonsmoker  Home BP Monitoring: is not measured at home. Pertinent ROS: taking medications as instructed, no medication side effects noted, no TIA's, no chest pain on exertion, no dyspnea on exertion, no swelling of ankles. Diabetes Mellitus:  She has diabetes mellitus, hypertension and hyperlipidemia. Diabetic ROS - medication compliance: compliant most of the time, diabetic diet compliance: compliant most of the time. Lab review: labs reviewed, I note that glycosylated hemoglobin mildly abnormal but acceptable. Asthma Review:  The patient is being seen for follow up of asthma, not currently in exacerbation. Asthma symptoms occur: less than 2x/week, infrequently. Wheezing when present is described as mild and easily relieved with rescue bronchodilator. Current limitations in activity from asthma: none. Number of days of school or work missed in the last month: 0. Frequency of use of quick-relief meds: < 2 x / week. Regimen compliance: The patient reports adherence to this regimen. Patient does not smoke cigarettes.       Patient Active Problem List   Diagnosis Code    Osteoarthritis of left knee M17.12    Hypertension I10    Hyperlipidemia E78.5    GERD (gastroesophageal reflux disease) K21.9    Asthma J45.909    Type 2 diabetes mellitus ([facility]) E11.9    SBO (small bowel obstruction) ([facility]) K56.609    Chronic abdominal pain R10.9, G89.29    Post-operative pain G89.18    Chest pain R07.9    Essential hypertension, benign I10    Sural neuritis G57.80    Type 2 diabetes mellitus with nephropathy ([facility]) E11.21    Non-intractable cyclical vomiting with nausea G43. A0     Patient Active Problem List    Diagnosis Date Noted    Non-intractable cyclical vomiting with nausea 01/24/2018    [facility] 01/19/2018    Chronic abdominal pain 03/14/2017    Post-operative pain 03/14/2017    SBO (small bowel obstruction) ([facility]) 02/21/2017    Osteoarthritis of left knee 06/27/2016    Hypertension 06/27/2016    Hyperlipidemia 06/27/2016    GERD (gastroesophageal reflux disease) 06/27/2016    Asthma 06/27/2016    Type 2 diabetes mellitus ([facility] 06/27/2016    Sural neuritis 06/23/2014    Chest pain 04/20/2014    Essential hypertension, benign 02/27/2012     Current Outpatient Prescriptions   Medication Sig Dispense Refill    metFORMIN (GLUMETZA ER) 500 mg TG24 24 hour tablet take 1 tablet by mouth twice a day 60 Tab 2    magnesium citrate solution Take 1/3 of bottle every 8 hours until finished or until you have a bowel movement. 1 Bottle 0    ondansetron (ZOFRAN ODT) 4 mg disintegrating tablet Take 1 Tab by mouth every eight (8) hours as needed for Nausea. 12 Tab 0    bisoprolol-hydroCHLOROthiazide (ZIAC) 2.5-6.25 mg per tablet Take 1 Tab by mouth daily. 90 Tab 3    polyethylene glycol (MIRALAX) 17 gram/dose powder Take 17 g by mouth two (2) times a day. 1 capful with 8 oz of water daily 119 g 0    senna (SENOKOT) 8.6 mg tablet Take 1 Tab by mouth daily.  30 Tab 0    amLODIPine (NORVASC) 2.5 mg tablet take 1 tablet by mouth NIGHTLY 90 Tab 1    doxepin (SINEQUAN) 10 mg capsule Take 1 Cap by mouth nightly. 30 Cap 11    dapagliflozin (FARXIGA) 10 mg tab tablet Take 1 Tab by mouth daily. Indications: type 2 diabetes mellitus 90 Tab 3    omeprazole (PRILOSEC) 10 mg capsule Take 1 Cap by mouth daily. 30 Cap 11    atorvastatin (LIPITOR) 40 mg tablet Take 1 Tab by mouth nightly. 90 Tab 3    glucose blood VI test strips (ONETOUCH ULTRA TEST) strip Check blood glucose as directed 100 Strip 1    ALLERGY RELIEF, CETIRIZINE, 10 mg tablet take 1 tablet by mouth once daily  0    albuterol (PROVENTIL HFA, VENTOLIN HFA, PROAIR HFA) 90 mcg/actuation inhaler Take 1 Puff by inhalation every six (6) hours as needed for Wheezing.  1 Inhaler 0     Allergies   Allergen Reactions    Macrodantin [Nitrofurantoin Macrocrystalline] Itching and Other (comments)    Tape [Adhesive] Other (comments)     Paper tape-- feels like burning skin  Burned skin when had wound vac     Past Medical History:   Diagnosis Date    Abdominal adhesions     Anemia     Arthritis     all over the body    Asthma     Cocaine abuse     Diabetes mellitus     Dyskinesia     bilateral    Essential hypertension     GERD (gastroesophageal reflux disease)     Hypertension     Menopause     Microhematuria     Stool color black      Past Surgical History:   Procedure Laterality Date    HX CHOLECYSTECTOMY  6/28/10    HX COLONOSCOPY      HX ENDOSCOPY      HX HERNIA REPAIR      HX HYSTERECTOMY      Fibroids    HX KNEE REPLACEMENT Left     HX OOPHORECTOMY  12/2000    HX ORTHOPAEDIC Right     ankle- multiple surgeries    HX SMALL BOWEL RESECTION  12/2000    HX SMALL BOWEL RESECTION  02/21/2017    Dr. Miguel Alberta       Family History   Problem Relation Age of Onset    Hypertension Other      parent    Breast Cancer Other 21    Heart Disease Father     Hypertension Father     Diabetes Father     Diabetes Mother     Hypertension Other      sibling   Job Chamberlain Diabetes Other      parent    Diabetes Sister     Kidney Disease Brother     Diabetes Brother      Social History   Substance Use Topics    Smoking status: Never Smoker    Smokeless tobacco: Never Used    Alcohol use No       ROS   Constitutional: Negative for chills, fatigue and fever. HENT: Negative.  Negative for sore throat.    Eyes: Negative.    Respiratory: Negative for cough and shortness of breath.    Cardiovascular: Negative for chest pain and palpitations. Gastrointestinal: Positive for abdominal pain, diarrhea, nausea and vomiting. Genitourinary: Positive for frequency. Negative for dysuria. Musculoskeletal: Negative.    Skin: Negative.    Neurological: Negative for dizziness, weakness, light-headedness and headaches. Psychiatric/Behavioral: Negative.      All other systems reviewed and are negative. Objective:  Vitals:    08/06/18 1541   BP: 119/74   Pulse: 89   Resp: 16   Temp: 96 °F (35.6 °C)   TempSrc: Oral   SpO2: 98%   Weight: 153 lb 6.4 oz (69.6 kg)   Height: 5' 4\" (1.626 m)   PainSc:   8   PainLoc: Leg     Constitutional: She is oriented to person, place, and time. She appears well-developed and well-nourished. No distress. HENT:   Head: Normocephalic and atraumatic. Mouth/Throat: Oropharynx is clear and moist.   Eyes: Conjunctivae are normal. No scleral icterus. Neck: Neck supple. No JVD present. No tracheal deviation present. Cardiovascular: Normal rate, regular rhythm and normal heart sounds.    Pulmonary/Chest: Effort normal and breath sounds normal. No respiratory distress. She has no wheezes. Abdominal: Soft. Normal appearance and bowel sounds are normal. She exhibits no distension and no mass. There is tenderness in the right lower quadrant and left lower quadrant. There is no rebound, no guarding and no CVA tenderness. Knee exam:  Right knee: + post op erythema, edema, or bruising.  Nontender to quadriceps tendon Nontender to patellar tendon or tibial tuberosity. No joint line tenderness laterally or medially. Left knee: + erythema, edema, or bruising. +joint line tenderness laterally or medially. [facility]  OFFICE PROCEDURE PROGRESS NOTE        Chart reviewed for the following:   Colton Jones MD, have reviewed the History, Physical and updated the Allergic reactions for [address] performed immediately prior to start of procedure:   I, Sherley Nelson MD, have performed the following reviews on Margaux Marcos prior to the start of the procedure:            * Patient was identified by name and date of birth   * Agreement on procedure being performed was verified  * Risks and Benefits explained to the patient  * Procedure site verified and marked as necessary  * Patient was positioned for comfort  * Understanding confirmed and consent was signed and verified     Time: .4:24 PM       Date of procedure: 8/6/2018    Procedure performed by:  Sherley Nelson MD    Provider assisted by: Bambi Mir LPN    Patient assisted by: self    How tolerated by patient: tolerated the procedure well with no complications    Comments: none    after obtaining informed consent the left knee was prepped in sterile fashion; ethyl chloride used for topical anesthesia; 3 cc 1:1:1 marcaine 0.5%, lidocaine 1% without epi and kenalog 40 mg/cc injected into knee joint EBL < 1 cc; post procedure pain 2/10        Assessment/Plan:    1. Bilateral carpal tunnel syndrome  Pain management  - traMADol (ULTRAM) 50 mg tablet; Take 1 Tab by mouth every six (6) hours as needed for Pain. Max Daily Amount: 200 mg. Dispense: 12 Tab; Refill: 0    2. Mass of left lower extremity  Pain management; infection well tolerated  - traMADol (ULTRAM) 50 mg tablet; Take 1 Tab by mouth every six (6) hours as needed for Pain. Max Daily Amount: 200 mg. Dispense: 12 Tab; Refill: 0    3.  Type 2 diabetes mellitus with hyperglycemia, without long-term current use of insulin (HCC)  Goal hgb a1c <7; controlled    4. Post-op pain  F/u with orthopedic surgeon as scheduled  - traMADol (ULTRAM) 50 mg tablet; Take 1 Tab by mouth every six (6) hours as needed for Pain. Max Daily Amount: 200 mg. Dispense: 12 Tab; Refill: 0    Lab review: no lab studies available for review at time of visit      I have discussed the diagnosis with the patient and the intended plan as seen in the above orders. The patient has received an after-visit summary and questions were answered concerning future plans. I have discussed medication side effects and warnings with the patient as well. I have reviewed the plan of care with the patient, accepted their input and they are in agreement with the treatment goals. Follow-up Disposition:  Return if symptoms worsen or fail to improve, for procedure.

## 2018-08-06 NOTE — PATIENT INSTRUCTIONS
Carpal Tunnel Syndrome: Exercises  Your Care Instructions  Here are some examples of typical rehabilitation exercises for your condition. Start each exercise slowly. Ease off the exercise if you start to have pain. Your doctor or your physical or occupational therapist will tell you when you can start these exercises and which ones will work best for you. Warm-up stretches  When you no longer have pain or numbness, you can do exercises to help prevent carpal tunnel syndrome from coming back. Do not do any stretch or movement that is uncomfortable or painful. 1. Rotate your wrist up, down, and from side to side. Repeat 4 times. 2. Stretch your fingers far apart. Relax them, and then stretch them again. Repeat 4 times. 3. Stretch your thumb by pulling it back gently, holding it, and then releasing it. Repeat 4 times. How to do the exercises  Prayer stretch    1. Start with your palms together in front of your chest just below your chin. 2. Slowly lower your hands toward your waistline, keeping your hands close to your stomach and your palms together until you feel a mild to moderate stretch under your forearms. 3. Hold for at least 15 to 30 seconds. Repeat 2 to 4 times. Wrist flexor stretch    1. Extend your arm in front of you with your palm up. 2. Bend your wrist, pointing your hand toward the floor. 3. With your other hand, gently bend your wrist farther until you feel a mild to moderate stretch in your forearm. 4. Hold for at least 15 to 30 seconds. Repeat 2 to 4 times. Wrist extensor stretch    1. Repeat steps 1 through 4 of the stretch above, but begin with your extended hand palm down. Follow-up care is a key part of your treatment and safety. Be sure to make and go to all appointments, and call your doctor if you are having problems. It's also a good idea to know your test results and keep a list of the medicines you take. Where can you learn more?   Go to http://betzy-didi.info/. Enter T130 in the search box to learn more about \"Carpal Tunnel Syndrome: Exercises. \"  Current as of: November 29, 2017  Content Version: 11.7  © 1179-2207 [employer]. Care instructions adapted under license by [employer] (which disclaims liability or warranty for this information). If you have questions about a medical condition or this instruction, always ask your healthcare professional. [place] [address] any warranty or liability for your use of this information.

## 2018-08-06 NOTE — MR AVS SNAPSHOT
[address] [place] [address] [phone] 
[phone] Patient: Gómez Ludwig MRN: [id number] IQY:[id number] Visit Information Date & Time Provider Department Dept. Phone Encounter #  
 8/6/2018  3:30 PM [facility] [phone] [id number] Follow-up Instructions Return if symptoms worsen or fail to improve, for procedure. Upcoming Health Maintenance Date Due DTaP/Tdap/Td series (1 - Tdap) 2/4/1981 PAP AKA CERVICAL CYTOLOGY 2/4/1981 EYE EXAM RETINAL OR DILATED Q1 5/29/2018 HEMOGLOBIN A1C Q6M 7/19/2018 Influenza Age 5 to Adult 8/1/2018 FOOT EXAM Q1 6/12/2019 MICROALBUMIN Q1 6/12/2019 LIPID PANEL Q1 6/12/2019 BREAST CANCER SCRN MAMMOGRAM 11/24/2019 COLONOSCOPY 3/3/2026 Allergies as of 8/6/2018  Review Complete On: 8/6/2018 By: Adore Rutherford LPN Severity Noted Reaction Type Reactions Macrodantin [Nitrofurantoin Macrocrystalline]  01/31/2012    Itching, Other (comments) Tape [Adhesive]  09/30/2014    Other (comments) Paper tape-- feels like burning skin Burned skin when had wound vac Current Immunizations  Reviewed on 6/12/2018 Name Date Influenza Vaccine 11/15/2016 Pneumococcal Polysaccharide (PPSV-23) 6/12/2018 Not reviewed this visit You Were Diagnosed With   
  
 Codes Comments Bilateral carpal tunnel syndrome    -  Primary ICD-10-CM: G56.03 
ICD-9-CM: 354.0 Mass of left lower extremity     ICD-10-CM: R22.42 
ICD-9-CM: 441. 2 Type 2 diabetes mellitus with hyperglycemia, without long-term current use of insulin (HCC)     ICD-10-CM: E11.65 ICD-9-CM: 250.00, 790.29 Post-op pain     ICD-10-CM: G89.18 
ICD-9-CM: 338.18 Vitals BP Pulse Temp Resp Height(growth percentile) Weight(growth percentile)  119/74 (BP 1 Location: Left arm, BP Patient Position: Sitting) 89 (!) 46 °F (7.8 °C) (Oral) 16 5' 4\" (1.626 m) 153 lb 6.4 oz (69.6 kg) SpO2 BMI OB Status Smoking Status 98% 26.33 kg/m2 Hysterectomy Never Smoker Vitals History BMI and BSA Data Body Mass Index Body Surface Area  
 26.33 kg/m 2 1.77 m 2 Preferred Pharmacy Pharmacy Name Phone [facility] [address]  [facility] [phone] Your Updated Medication List  
  
   
This list is accurate as of 8/6/18  4:27 PM.  Always use your most recent med list.  
  
  
  
  
 albuterol 90 mcg/actuation inhaler Commonly known as:  PROVENTIL HFA, VENTOLIN HFA, PROAIR HFA Take 1 Puff by inhalation every six (6) hours as needed for Wheezing. ALLERGY RELIEF (CETIRIZINE) 10 mg tablet Generic drug:  cetirizine  
take 1 tablet by mouth once daily  
  
 amLODIPine 2.5 mg tablet Commonly known as:  NORVASC  
take 1 tablet by mouth NIGHTLY  
  
 atorvastatin 40 mg tablet Commonly known as:  LIPITOR Take 1 Tab by mouth nightly. bisoprolol-hydroCHLOROthiazide 2.5-6.25 mg per tablet Commonly known as:  [facility] Take 1 Tab by mouth daily. dapagliflozin 10 mg Tab tablet Commonly known as:  [employer] Take 1 Tab by mouth daily. Indications: type 2 diabetes mellitus  
  
 doxepin 10 mg capsule Commonly known as:  SINEquan Take 1 Cap by mouth nightly. glucose blood VI test strips strip Commonly known as:  ONETOUCH ULTRA TEST Check blood glucose as directed  
  
 magnesium citrate solution Take 1/3 of bottle every 8 hours until finished or until you have a bowel movement. metFORMIN 500 mg Tg24 24 hour tablet Commonly known as:  GLUMETZA ER  
take 1 tablet by mouth twice a day  
  
 omeprazole 10 mg capsule Commonly known as:  PRILOSEC Take 1 Cap by mouth daily. ondansetron 4 mg disintegrating tablet Commonly known as:  ZOFRAN ODT Take 1 Tab by mouth every eight (8) hours as needed for Nausea. polyethylene glycol 17 gram/dose powder Commonly known as:  Lelan Situ Take 17 g by mouth two (2) times a day. 1 capful with 8 oz of water daily  
  
 senna 8.6 mg tablet Commonly known as:  [employer] Take 1 Tab by mouth daily. traMADol 50 mg tablet Commonly known as:  ULTRAM  
Take 1 Tab by mouth every six (6) hours as needed for Pain. Max Daily Amount: 200 mg.  
  
 triamcinolone acetonide 40 mg/mL injection Commonly known as:  KENALOG  
1 mL by Intra artICUlar route once for 1 dose. Prescriptions Printed Refills  
 traMADol (ULTRAM) 50 mg tablet 0 Sig: Take 1 Tab by mouth every six (6) hours as needed for Pain. Max Daily Amount: 200 mg. Class: Print Route: Oral  
  
We Performed the Following INJECT TENDON SHEATH/LIGAMENT [id number] CPT(R)] TRIAMCINOLONE ACETONIDE INJ [ [facility]] Follow-up Instructions Return if symptoms worsen or fail to improve, for procedure. To-Do List   
 08/08/2018 11:30 AM  
  Appointment with [address] [facility] 1 at [facility] Aleena TRUJILLO ([phone])  
  
 08/10/2018 11:00 AM  
  Appointment with [facility] at [facility] Aleena TRUJILLO ([phone])  
  
 08/13/2018 10:30 AM  
  Appointment with Neal López, PT at [facility] Aleena TRUJILLO ([phone])  
  
 08/15/2018 2:00 PM  
  Appointment with Amanda Ramirez PTA at [facility] Aleena TRUJILLO ([phone])  
  
 08/17/2018 10:30 AM  
  Appointment with Amanda Ramirez PTA at [facility] Aleena TRUJILLO ([phone]) Patient Instructions Carpal Tunnel Syndrome: Exercises Your Care Instructions Here are some examples of typical rehabilitation exercises for your condition. Start each exercise slowly. Ease off the exercise if you start to have pain. Your doctor or your physical or occupational therapist will tell you when you can start these exercises and which ones will work best for you. Warm-up stretches When you no longer have pain or numbness, you can do exercises to help prevent carpal tunnel syndrome from coming back. Do not do any stretch or movement that is uncomfortable or painful. 1. Rotate your wrist up, down, and from side to side. Repeat 4 times. 2. Stretch your fingers far apart. Relax them, and then stretch them again. Repeat 4 times. 3. Stretch your thumb by pulling it back gently, holding it, and then releasing it. Repeat 4 times. How to do the exercises Prayer stretch 1. Start with your palms together in front of your chest just below your chin. 2. Slowly lower your hands toward your waistline, keeping your hands close to your stomach and your palms together until you feel a mild to moderate stretch under your forearms. 3. Hold for at least 15 to 30 seconds. Repeat 2 to 4 times. Wrist flexor stretch 1. Extend your arm in front of you with your palm up. 2. Bend your wrist, pointing your hand toward the floor. 3. With your other hand, gently bend your wrist farther until you feel a mild to moderate stretch in your forearm. 4. Hold for at least 15 to 30 seconds. Repeat 2 to 4 times. Wrist extensor stretch 1. Repeat steps 1 through 4 of the stretch above, but begin with your extended hand palm down. Follow-up care is a key part of your treatment and safety. Be sure to make and go to all appointments, and call your doctor if you are having problems. It's also a good idea to know your test results and keep a list of the medicines you take. Where can you learn more? Go to http://betzy-didi.info/. Enter G830 in the search box to learn more about \"Carpal Tunnel Syndrome: Exercises. \" Current as of: November 29, 2017 Content Version: 11.7 © 9678-4218 [employer]. Care instructions adapted under license by [employer] (which disclaims liability or warranty for this information).  If you have questions about a medical condition or this instruction, always ask your healthcare professional. [employer] disclaims any warranty or liability for your use of this information. Introducing [facility]! Briana Roberts introduces [facility] patient portal. Now you can access parts of your medical record, email your doctor's office, and request medication refills online. 1. In your internet browser, go to [email or web address] 2. Click on the First Time User? Click Here link in the Sign In box. You will see the New Member Sign Up page. 3. Enter your [facility] Access Code exactly as it appears below. You will not need to use this code after youve completed the sign-up process. If you do not sign up before the expiration date, you must request a new code. · [facility] Access Code: [email or web address] Expires: 9/10/2018  3:19 PM 
 
4. Enter the last four digits of your Social Security Number (xxxx) and Date of Birth (mm/dd/yyyy) as indicated and click Submit. You will be taken to the next sign-up page. 5. Create a [facility] ID. This will be your [facility] login ID and cannot be changed, so think of one that is secure and easy to remember. 6. Create a [facility] password. You can change your password at any time. 7. Enter your Password Reset Question and Answer. This can be used at a later time if you forget your password. 8. Enter your e-mail address. You will receive e-mail notification when new information is available in [address] 9. Click Sign Up. You can now view and download portions of your medical record. 10. Click the Download Summary menu link to download a portable copy of your medical information. If you have questions, please visit the Frequently Asked Questions section of the [employer] website. Remember, [facility] is NOT to be used for urgent needs. For medical emergencies, dial 911. Now available from your iPhone and Android! Please provide this summary of care documentation to your next provider. Your primary care clinician is listed as Tulio Jenkins. If you have any questions after today's visit, please call [phone].

## 2018-08-08 ENCOUNTER — HOSPITAL ENCOUNTER (OUTPATIENT)
Dept: PHYSICAL THERAPY | Age: 58
Discharge: HOME | End: 2018-08-08
Payer: COMMERCIAL

## 2018-08-08 PROCEDURE — 97110 THERAPEUTIC EXERCISES: CPT | Performed by: PHYSICAL THERAPIST

## 2018-08-08 PROCEDURE — 97140 MANUAL THERAPY 1/> REGIONS: CPT | Performed by: PHYSICAL THERAPIST

## 2018-08-08 NOTE — PROGRESS NOTES
PT DAILY TREATMENT NOTE     Patient Name: Katelin Sykes  Date:2018  : 1960  [x]  Patient  Verified  Payor: Butch Ryan / [place] / Product Type: PPO /    In time:1101am  Out time:1240pm  Total Treatment Time (min): 99  Total Timed Codes (min): 94  1:1 Treatment Time (min): 1253 to 12:27   34 min   Visit #: 5 of     Treatment Area: Right knee pain [M25.561]  Acute postoperative pain of knee [G89.18, M25.569]    SUBJECTIVE  Pain Level (0-10 scale): 1 \"sore\"  Any medication changes, allergies to medications, adverse drug reactions, diagnosis change, or new procedure performed?: [x] No    [] Yes (see summary sheet for update)  Subjective functional status/changes:   [] No changes reported  It is still so sore on the inside of my knee    OBJECTIVE  Modality rationale: decrease edema, decrease inflammation and decrease pain to improve the patients ability to improve gait, stairs    Min Type Additional Details    [] Estim: []Att   []Unatt        []TENS instruct                  []IFC  []Premod   []NMES                     []Other:  []w/US   []w/ice   []w/heat  Position:  Location:    []  Traction: [] Cervical       []Lumbar                       [] Prone          []Supine                       []Intermittent   []Continuous Lbs:  [] before manual  [] after manual    []  Ultrasound: []Continuous   [] Pulsed                           []1MHz   []3MHz Location:  W/cm2:    []  Iontophoresis with dexamethasone         Location: [] Take home patch   [] In clinic    []  Ice     []  heat  []  Ice massage Position:  Location:   10 [x]  Vasopneumatic Device Pressure:       [] lo [x] med [] hi   Temperature: [x] lo [] med [] hi   [x] Skin assessment post-treatment:  [x]intact []redness- no adverse reaction       []redness  adverse reaction:       74/24 min Therapeutic Exercise:  [x] See flow sheet : added step knee flexion and SLR x3 with QS.     Rationale: increase ROM, increase strength, improve coordination and improve balance to improve the patients ability to improve gait, mobility, return to work     10 min Manual Therapy:  Scar isrrael, grade IV patellar glides, PROM, EOB knee flexion mobe   Rationale: decrease pain, increase ROM, increase tissue extensibility and decrease edema  to improve gait, stairs      min Gait Training:  ___ feet with ___ device on level surfaces with ___ level of assist   Rationale:           x min Patient Education: [x] Review HEP   Gait with SPC at home     Other Objective/Functional Measures:   DARRION AYALA knee  115deg at end of session  With pain over med knee limiting, mild pitting swelling around patella  TTP over med knee jt   Decreased tolerance to prone quad stretch due to med jt pain  Pain Level (0-10 scale) post treatment: 0    ASSESSMENT/Changes in Function:     SLS at 12 sec today, mild pitting swelling in around patella noted, Increased from 111deg to 115 this treatment , but pt states she was at 120deg prior. Using [facility] in home and today at therapy. Instructed pt to use SPC at all times as her gait is safe and normalized. Patient will continue to benefit from skilled PT services to modify and progress therapeutic interventions, address functional mobility deficits, address ROM deficits, address strength deficits, analyze and address soft tissue restrictions, analyze and cue movement patterns, analyze and modify body mechanics/ergonomics, assess and modify postural abnormalities, address imbalance/dizziness and instruct in home and community integration to attain remaining goals. []  See Plan of Care  []  See progress note/recertification  []  See Discharge Summary         Progress towards goals / Updated goals:  Short Term Goals: To be accomplished in  1  weeks:  1.  Pt will be independent and compliant with HEP.  -Goal met; pt notes to compliance (7/30/18)  Long Term Goals: To be accomplished in  8-12  treatments:  1.  Patient will increase FOTO score to 52/100 for indications of increased functional mobility.    2.  Patient will demo increased AROM knee flexion to 120 for ease with transfers. -Goal progressing; 118 deg knee flexion AROM in supine (7/30/18) - reduced to 115 deg today (8/8/18)  3. Patient will demo AROM knee extension to 0 deg for normalized gait. -Goal met; achieved 0 deg knee extension (8/3/18)  4.   Patient will demo 4/5 knee extension strength for ease with stair negotiation. -Goal progressing; 3-/5 knee ext strength (8/3/18); progressing TE with no limitations (8/6/18)  PLAN  [x]  Upgrade activities as tolerated     []  Continue plan of care  []  Update interventions per flow sheet       []  Discharge due to:_  [x]  Other:_     Srinivas Carreno, PT 8/8/2018  7:48 AM

## 2018-08-10 ENCOUNTER — HOSPITAL ENCOUNTER (OUTPATIENT)
Dept: PHYSICAL THERAPY | Age: 58
Discharge: HOME | End: 2018-08-10
Payer: COMMERCIAL

## 2018-08-10 PROCEDURE — 97110 THERAPEUTIC EXERCISES: CPT | Performed by: PHYSICAL THERAPIST

## 2018-08-10 PROCEDURE — 97140 MANUAL THERAPY 1/> REGIONS: CPT | Performed by: PHYSICAL THERAPIST

## 2018-08-10 NOTE — PROGRESS NOTES
PT DAILY TREATMENT NOTE     Patient Name: Reese Fisher [facility]  Date:8/10/2018  : 1960  [x]  Patient  Verified  Payor: Dwayne Glezed / [place] / Product Type: PPO /    In time:11:00  Out time:12:10  Total Treatment Time (min): 70  Total Timed Codes (min): NA  1:1 Treatment Time (min): NA   Visit #: 6 of     Treatment Area: Right knee pain [M25.561]  Acute postoperative pain of knee [G89.18, M25.569]    SUBJECTIVE  Pain Level (0-10 scale): 0/10  Any medication changes, allergies to medications, adverse drug reactions, diagnosis change, or new procedure performed?: [x] No    [] Yes (see summary sheet for update)  Subjective functional status/changes:   [] No changes reported  States her knee feels really good today; states it's usually stiff but it's not today.     OBJECTIVE  Modality rationale: decrease edema and decrease pain to improve the patients ability to ambulate and negotiate stairs   Min Type Additional Details    [] Estim: []Att   []Unatt        []TENS instruct                  []IFC  []Premod   []NMES                     []Other:  []w/US   []w/ice   []w/heat  Position:  Location:    []  Traction: [] Cervical       []Lumbar                       [] Prone          []Supine                       []Intermittent   []Continuous Lbs:  [] before manual  [] after manual    []  Ultrasound: []Continuous   [] Pulsed                           []1MHz   []3MHz Location:  W/cm2:    []  Iontophoresis with dexamethasone         Location: [] Take home patch   [] In clinic   5 [x]  Ice     []  heat  []  Ice massage Position: long sitting  Location: right knee    []  Vasopneumatic Device Pressure:       [] lo [] med [] hi   Temperature: [] lo [] med [] hi   [x] Skin assessment post-treatment:  [x]intact []redness- no adverse reaction       []redness  adverse reaction:       50 min Therapeutic Exercise:  [x] See flow sheet : added back bridges   Rationale: increase ROM, increase strength, improve balance and increase proprioception to improve gait, mobility, and return to work     10 min Manual Therapy:  Scar massage, grade IV medial and lateral patellar glides, knee PROM with flexion mob in supine   Rationale: increase ROM and increase tissue extensibility to improve gait and stair negotiation            TE min Patient Education: [x] Review HEP    [] Progressed/Changed HEP based on:   [x] positioning   [x] body mechanics   [] transfers   [] heat/ice application        Other Objective/Functional Measures: Knee ROM 0-115 deg, moderate scar adhesion, decreased patellar glide medial > lateral    Pain Level (0-10 scale) post treatment: 0/10    ASSESSMENT/Changes in Function: Returned to bridges today with emphasis on glute activation and forward knee drive to improve CKC flexion. Patient able to perform 8 before c/o medial knee pain upon which the exercise was stopped. No significant change in ROM compared to previous visit. Patient will continue to benefit from skilled PT services to modify and progress therapeutic interventions, address functional mobility deficits, address ROM deficits, address strength deficits, analyze and address soft tissue restrictions, analyze and cue movement patterns and analyze and modify body mechanics/ergonomics to attain remaining goals. []  See Plan of Care  []  See progress note/recertification  []  See Discharge Summary         Progress towards goals / Updated goals:  Short Term Goals: To be accomplished in  1  weeks:  1.  Pt will be independent and compliant with HEP. -Goal met; pt notes to compliance (7/30/18)  Long Term Goals: To be accomplished in  8-12  treatments:  1.  Patient will increase FOTO score to 52/100 for indications of increased functional mobility.    2.  Patient will demo increased AROM knee flexion to 120 for ease with transfers.  -Goal progressing; 118 deg knee flexion AROM in supine (7/30/18) - reduced to 115 deg today (8/8/18)  3.  Patient will demo AROM knee extension to 0 deg for normalized gait. -Goal met; achieved 0 deg knee extension (8/3/18)  4.  Patient will demo 4/5 knee extension strength for ease with stair negotiation. -Goal progressing; 3-/5 knee ext strength (8/3/18); progressing TE with no limitations (8/6/18    PLAN  [x]  Upgrade activities as tolerated     []  Continue plan of care  []  Update interventions per flow sheet       []  Discharge due to:_  []  Other:_      Cheryll Runner, DPT 8/10/2018  11:05 AM

## 2018-08-13 ENCOUNTER — HOSPITAL ENCOUNTER (OUTPATIENT)
Dept: PHYSICAL THERAPY | Age: 58
Discharge: HOME | End: 2018-08-13
Payer: COMMERCIAL

## 2018-08-13 PROCEDURE — 97140 MANUAL THERAPY 1/> REGIONS: CPT

## 2018-08-13 PROCEDURE — 97110 THERAPEUTIC EXERCISES: CPT

## 2018-08-13 NOTE — PROGRESS NOTES
PT DAILY TREATMENT NOTE     Patient Name: Terri Mccrary [facility]  Date:2018  : 1960  [x]  Patient  Verified  Payor: OPTIMA / [place]: [place] [employer] / Product Type: PPO /    In time:10:31  Out time:11:34  Total Treatment Time (min): 63  Total Timed Codes (min): 53  1:1 Treatment Time (min): 39 min (10:31 to 11:10    Visit #: 7 of     Treatment Area: Right knee pain [M25.561]  Acute postoperative pain of knee [G89.18, M25.569]    SUBJECTIVE  Pain Level (0-10 scale): 0 \"sore\"  Any medication changes, allergies to medications, adverse drug reactions, diagnosis change, or new procedure performed?: [x] No    [] Yes (see summary sheet for update)  Subjective functional status/changes:   [] No changes reported  Pt notes pain at the medial aspect of the R knee, pretty constant and preventing sleep (up until 4:30am the past few nights)     OBJECTIVE  Modality rationale: decrease edema, decrease inflammation and decrease pain to improve the patients ability to improve    Min Type Additional Details    [] Estim: []Att   []Unatt        []TENS instruct                  []IFC  []Premod   []NMES                     []Other:  []w/US   []w/ice   []w/heat  Position:  Location:    []  Traction: [] Cervical       []Lumbar                       [] Prone          []Supine                       []Intermittent   []Continuous Lbs:  [] before manual  [] after manual    []  Ultrasound: []Continuous   [] Pulsed                           []1MHz   []3MHz Location:  W/cm2:    []  Iontophoresis with dexamethasone         Location: [] Take home patch   [] In clinic   10 [x]  Ice     []  heat  []  Ice massage Position: semireclined  Location: R knee    []  Vasopneumatic Device Pressure:       [] lo [] med [] hi   Temperature: [] lo [] med [] hi   [x] Skin assessment post-treatment:  [x]intact []redness- no adverse reaction       []redness  adverse reaction:       38 (24) min Therapeutic Exercise:  [x] See flow sheet : Rationale: increase ROM, increase strength, improve coordination, improve balance and increase proprioception to improve the patients ability to improve gait, mobility     15 min Manual Therapy:  Scar massage, grade IV medial and lateral patellar glides, knee PROM with flexion mob in supine, DTM To R adductors and pes anserine mm group    Rationale: decrease pain, increase ROM, increase tissue extensibility and decrease trigger points to improve gait, stairs           x min Patient Education: [x] Review HEP    [] Progressed/Changed HEP based on:   Advised to call MD office to ask about topical for the focal pain over the medial joint line      [] positioning   [] body mechanics   [] transfers   [] heat/ice application        Other Objective/Functional Measures:   AAROM R knee 105 flexion today limited by medial joint line pain; 113 after TE   Pain Level (0-10 scale) post treatment: 0    ASSESSMENT/Changes in Function: Limited in R knee AROM today due to medial joint line pain. progressing well with strength. Demo's excellent gait with the SPC on level surfaces for unlimited distance in the clinic. 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 Patient will continue to benefit from skilled PT services to modify and progress therapeutic interventions, address functional mobility deficits, address ROM deficits, address strength deficits, analyze and address soft tissue restrictions, analyze and cue movement patterns, analyze and modify body mechanics/ergonomics, assess and modify postural abnormalities and address imbalance/dizziness to attain remaining goals. []  See Plan of Care  []  See progress note/recertification  []  See Discharge Summary         Progress towards goals / Updated goals:  Short Term Goals: To be accomplished in  1  weeks:  1.  Pt will be independent and compliant with HEP.  -Goal met; pt notes to compliance (7/30/18)  Long Term Goals: To be accomplished in  8-12  treatments:  1.  Patient will increase FOTO score to 52/100 for indications of increased functional mobility.    2.  Patient will demo increased AROM knee flexion to 120 for ease with transfers.  -Goal progressing; 118 deg knee flexion AROM in supine (7/30/18) - reduced to 115 deg today (8/8/18)  3.  Patient will demo AROM knee extension to 0 deg for normalized gait. -Goal met; achieved 0 deg knee extension (8/3/18)  4.  Patient will demo 4/5 knee extension strength for ease with stair negotiation. -Goal progressing; increased strength work today with no c/o (8/13/18)  PLAN  [x]  Upgrade activities as tolerated     []  Continue plan of care  []  Update interventions per flow sheet       []  Discharge due to:_  [x]  Other:_assess medial joint line pain       Tasha Yancey, PT 8/13/2018  7:43 AM

## 2018-08-15 ENCOUNTER — HOSPITAL ENCOUNTER (OUTPATIENT)
Dept: PHYSICAL THERAPY | Age: 58
Discharge: HOME | End: 2018-08-15
Payer: COMMERCIAL

## 2018-08-15 PROCEDURE — 97140 MANUAL THERAPY 1/> REGIONS: CPT

## 2018-08-15 PROCEDURE — 97110 THERAPEUTIC EXERCISES: CPT

## 2018-08-15 NOTE — PROGRESS NOTES
PT DAILY TREATMENT NOTE     Patient Name: Marleni Ramirez [facility]  Date:8/15/2018  : 1960  [x]  Patient  Verified  Payor: Melissa Orlando / Plan: [place] [employer] [place] / Product Type: PPO /    In time: 1:52 pm              Out time: 2:54 pm  Total Treatment Time (min): 62  Total Timed Codes (min): 52  1:1 Treatment Time (min): 45  Visit #: 8 of     Treatment Area: Right knee pain [M25.561]  Acute postoperative pain of knee [G89.18, M25.569]    SUBJECTIVE  Pain Level (0-10 scale): 1-2  Any medication changes, allergies to medications, adverse drug reactions, diagnosis change, or new procedure performed?: [x] No    [] Yes (see summary sheet for update)  Subjective functional status/changes:   [] No changes reported  \"I am just achy at night. I am doing the sleep study now. \"    OBJECTIVE  Modality rationale: decrease edema, decrease inflammation and decrease pain to improve the patients ability to improve    Min Type Additional Details    [] Estim: []Att   []Unatt        []TENS instruct                  []IFC  []Premod   []NMES                     []Other:  []w/US   []w/ice   []w/heat  Position:  Location:    []  Traction: [] Cervical       []Lumbar                       [] Prone          []Supine                       []Intermittent   []Continuous Lbs:  [] before manual  [] after manual    []  Ultrasound: []Continuous   [] Pulsed                           []1MHz   []3MHz Location:  W/cm2:    []  Iontophoresis with dexamethasone         Location: [] Take home patch   [] In clinic   10 [x]  Ice     []  heat  []  Ice massage Position: semireclined  Location: R knee    []  Vasopneumatic Device Pressure:       [] lo [] med [] hi   Temperature: [] lo [] med [] hi   [x] Skin assessment post-treatment:  [x]intact []redness- no adverse reaction       []redness  adverse reaction:       42 min Therapeutic Exercise:  [x] See flow sheet:   Rationale: increase ROM, increase strength, improve coordination, improve balance and increase proprioception to improve the patients ability to improve gait, mobility     10 min Manual Therapy:  scar massage, grade IV medial and lateral patellar glides, knee PROM with flexion mob in supine, DTM to (R) adductors and pes anserine mm group    Rationale: decrease pain, increase ROM, increase tissue extensibility and decrease trigger points to improve gait, stairs           X min Patient Education: [x] Review HEP     Other Objective/Functional Measures:    Knee strength: flex 4-/5 sec pain, ext min quad lag with SLR   SLS: 16, 7, 9, seconds   AROM: 0-105 deg / 0-115 deg with pain   (+) TrP in the adductor longus and medial hamstring    Pain Level (0-10 scale) post treatment: 0 pain; 5 soreness     ASSESSMENT/Changes in Function:   Patient cont's to demo excellent gait with use of SPC. Cont's with medial joint line with full active flexion, but not passive. Noted soft tissue approximation with knee mobility into flexion. Reduced patellar mobility noted. Patient will continue to benefit from skilled PT services to modify and progress therapeutic interventions, address functional mobility deficits, address ROM deficits, address strength deficits, analyze and address soft tissue restrictions, analyze and cue movement patterns, analyze and modify body mechanics/ergonomics, assess and modify postural abnormalities and address imbalance/dizziness to attain remaining goals. [x]  See Plan of Care  []  See progress note/recertification  []  See Discharge Summary         Progress towards goals / Updated goals:  Short Term Goals: To be accomplished in  1  weeks:  1.  Pt will be independent and compliant with HEP. -Goal met; pt notes to compliance (7/30/18)  Long Term Goals: To be accomplished in  8-12  treatments:  1.  Patient will increase FOTO score to 52/100 for indications of increased functional mobility.    2.  Patient will demo increased AROM knee flexion to 120 for ease with horton sfers.  -Goal progressing; 115 deg knee flexion AROM in supine, post-MT (8/15/18)  3.  Patient will demo AROM knee extension to 0 deg for normalized gait. -Goal met; achieved 0 deg knee extension (8/15/18)  4.  Patient will demo 4/5 knee extension strength for ease with stair negotiation. -Goal progressing; min quad lag with SLR (8/15/18)    PLAN  [x]  Upgrade activities as tolerated     [x]  Continue plan of care  []  Update interventions per flow sheet       []  Discharge due to:_   [x]  Other: patient due to PN in two sessions    Mj Maxwell, PTA  8/15/2018

## 2018-08-17 ENCOUNTER — HOSPITAL ENCOUNTER (OUTPATIENT)
Dept: PHYSICAL THERAPY | Age: 58
End: 2018-08-17
Payer: COMMERCIAL

## 2018-08-17 VITALS
SYSTOLIC BLOOD PRESSURE: 119 MMHG | RESPIRATION RATE: 16 BRPM | HEART RATE: 89 BPM | HEIGHT: 64 IN | BODY MASS INDEX: 26.19 KG/M2 | OXYGEN SATURATION: 98 % | DIASTOLIC BLOOD PRESSURE: 74 MMHG | WEIGHT: 153.4 LBS | TEMPERATURE: 96 F

## 2018-08-20 ENCOUNTER — HOSPITAL ENCOUNTER (OUTPATIENT)
Dept: PHYSICAL THERAPY | Age: 58
Discharge: HOME | End: 2018-08-20
Payer: COMMERCIAL

## 2018-08-20 PROCEDURE — 97110 THERAPEUTIC EXERCISES: CPT

## 2018-08-20 PROCEDURE — 97140 MANUAL THERAPY 1/> REGIONS: CPT

## 2018-08-20 PROCEDURE — 97016 VASOPNEUMATIC DEVICE THERAPY: CPT

## 2018-08-20 NOTE — PROGRESS NOTES
PT DAILY TREATMENT NOTE     Patient Name: Thom Lange  Date:2018  : 1960  [x]  Patient  Verified  Payor: Marcell Valentine / Plan: [place] [employer] / Product Type: PPO /    In time: 11:36 am                Out time: 12:39 am  Total Treatment Time (min): 63  Total Timed Codes (min): 53  1:1 Treatment Time (min): 43  Visit #: 9 of     Treatment Area: Right knee pain [M25.561]  Acute postoperative pain of knee [G89.18, M25.569]    SUBJECTIVE  Pain Level (0-10 scale): 2  Any medication changes, allergies to medications, adverse drug reactions, diagnosis change, or new procedure performed?: [x] No    [] Yes (see summary sheet for update)  Subjective functional status/changes:   [] No changes reported  \"I have been trying to walk without the cane, but I walk better with it. \"    OBJECTIVE  Modality rationale: decrease edema, decrease inflammation and decrease pain to improve the patients ability to improve    Min Type Additional Details    [] Estim: []Att   []Unatt        []TENS instruct                  []IFC  []Premod   []NMES                     []Other:  []w/US   []w/ice   []w/heat  Position:  Location:    []  Traction: [] Cervical       []Lumbar                       [] Prone          []Supine                       []Intermittent   []Continuous Lbs:  [] before manual  [] after manual    []  Ultrasound: []Continuous   [] Pulsed                           []1MHz   []3MHz Location:  W/cm2:    []  Iontophoresis with dexamethasone         Location: [] Take home patch   [] In clinic   10 [x]  Ice     []  heat  []  Ice massage Position: semireclined  Location: R knee    []  Vasopneumatic Device Pressure:       [] lo [] med [] hi   Temperature: [] lo [] med [] hi   [x] Skin assessment post-treatment:  [x]intact []redness- no adverse reaction       []redness  adverse reaction:       42 min Therapeutic Exercise:  [x] See flow sheet: progressed step ups   Rationale: increase ROM, increase strength, improve coordination, improve balance and increase proprioception to improve the patients ability to improve gait, mobility     11 min Manual Therapy:  scar massage, grade IV medial and lateral patellar glides, knee PROM with flexion mob in supine, DTM to (R) adductors and pes anserine mm group; manual hip adductor stretch   Rationale: decrease pain, increase ROM, increase tissue extensibility and decrease trigger points to improve gait, stairs           X min Patient Education: [x] Review HEP     Other Objective/Functional Measures:   (R) knee A/PROM: 0-118 deg / 0-120 deg, limited by tightness and pain  Core strength: 100% bridge  (R) hip strength: flex 4/5, abd 4+/5, ext 4/5  (R) knee strength: ext 10 degree quad lag with SLR    Mild edema (1.4 cm difference at 3 inches above mid-patella) post-therex, therefore, reinitiated VASO. Pain Level (0-10 scale) post treatment: 0    ASSESSMENT/Changes in Function:   Patient cont's to demo normalized, improved gait pattern with use of SPC; pt does note feeling of buckling when amb at home without AD, but no falls recently. Discussed reassessment for NV with pt expressing interest in continuing sec improving ROM and strength since IE. Patient will continue to benefit from skilled PT services to modify and progress therapeutic interventions, address functional mobility deficits, address ROM deficits, address strength deficits, analyze and address soft tissue restrictions, analyze and cue movement patterns, analyze and modify body mechanics/ergonomics, assess and modify postural abnormalities and address imbalance/dizziness to attain remaining goals. [x]  See Plan of Care  []  See progress note/recertification  []  See Discharge Summary         Progress towards goals / Updated goals:  Short Term Goals: To be accomplished in  1  weeks:  1.  Pt will be independent and compliant with HEP.  -Goal met; pt notes to compliance (7/30/18)  Long Term Goals: To be accomplished in  8-12  treatments:  1.  Patient will increase FOTO score to 52/100 for indications of increased functional mobility.    2.  Patient will demo increased AROM knee flexion to 120 for ease with horton sfers.  -Goal progressing; 118 deg knee flexion AROM in supine, post-MT (8/20/18)  3.  Patient will demo AROM knee extension to 0 deg for normalized gait. -Goal met; achieved 0 deg knee extension (8/20/18)  4.  Patient will demo 4/5 knee extension strength for ease with stair negotiation. -Goal progressing; min 10 degree quad lag with SLR (8/20/18)    PLAN  [x]  Upgrade activities as tolerated     [x]  Continue plan of care  []  Update interventions per flow sheet       []  Discharge due to:_   [x]  Other: assess goals for PN NV; pt to see MD Friday    Norah Fritz, PTA  8/20/2018

## 2018-08-23 ENCOUNTER — HOSPITAL ENCOUNTER (INPATIENT)
Age: 58
LOS: 2 days | Discharge: HOME | DRG: 390 | End: 2018-08-25
Attending: EMERGENCY MEDICINE | Admitting: COLON & RECTAL SURGERY
Payer: COMMERCIAL

## 2018-08-23 ENCOUNTER — APPOINTMENT (OUTPATIENT)
Dept: CT IMAGING | Age: 58
DRG: 390 | End: 2018-08-23
Attending: PHYSICIAN ASSISTANT
Payer: COMMERCIAL

## 2018-08-23 ENCOUNTER — HOSPITAL ENCOUNTER (OUTPATIENT)
Dept: PHYSICAL THERAPY | Age: 58
End: 2018-08-23
Payer: COMMERCIAL

## 2018-08-23 DIAGNOSIS — R11.2 NAUSEA VOMITING AND DIARRHEA: ICD-10-CM

## 2018-08-23 DIAGNOSIS — K56.600 PARTIAL SMALL BOWEL OBSTRUCTION (HCC): Primary | ICD-10-CM

## 2018-08-23 DIAGNOSIS — R19.7 NAUSEA VOMITING AND DIARRHEA: ICD-10-CM

## 2018-08-23 DIAGNOSIS — R10.84 ABDOMINAL PAIN, GENERALIZED: ICD-10-CM

## 2018-08-23 PROBLEM — K56.609 BOWEL OBSTRUCTION (HCC): Status: ACTIVE | Noted: 2018-08-23

## 2018-08-23 LAB
ALBUMIN SERPL-MCNC: 3.8 G/DL (ref 3.4–5)
ALBUMIN/GLOB SERPL: 0.8 {RATIO} (ref 0.8–1.7)
ALP SERPL-CCNC: 217 U/L (ref 45–117)
ALT SERPL-CCNC: 20 U/L (ref 13–56)
ANION GAP SERPL CALC-SCNC: 10 MMOL/L (ref 3–18)
APPEARANCE UR: CLEAR
AST SERPL-CCNC: 16 U/L (ref 15–37)
BASOPHILS # BLD: 0 K/UL (ref 0–0.1)
BASOPHILS NFR BLD: 0 % (ref 0–2)
BILIRUB SERPL-MCNC: 0.3 MG/DL (ref 0.2–1)
BILIRUB UR QL: NEGATIVE
BUN SERPL-MCNC: 13 MG/DL (ref 7–18)
BUN/CREAT SERPL: 11 (ref 12–20)
CALCIUM SERPL-MCNC: 10.9 MG/DL (ref 8.5–10.1)
CHLORIDE SERPL-SCNC: 101 MMOL/L (ref 100–108)
CO2 SERPL-SCNC: 28 MMOL/L (ref 21–32)
COLOR UR: YELLOW
CREAT SERPL-MCNC: 1.17 MG/DL (ref 0.6–1.3)
DIFFERENTIAL METHOD BLD: ABNORMAL
EOSINOPHIL # BLD: 0.1 K/UL (ref 0–0.4)
EOSINOPHIL NFR BLD: 1 % (ref 0–5)
ERYTHROCYTE [DISTWIDTH] IN BLOOD BY AUTOMATED COUNT: 14.1 % (ref 11.6–14.5)
EST. AVERAGE GLUCOSE BLD GHB EST-MCNC: 140 MG/DL
GLOBULIN SER CALC-MCNC: 4.8 G/DL (ref 2–4)
GLUCOSE SERPL-MCNC: 176 MG/DL (ref 74–99)
GLUCOSE UR STRIP.AUTO-MCNC: >1000 MG/DL
HBA1C MFR BLD: 6.5 % (ref 4.2–5.6)
HCT VFR BLD AUTO: 47 % (ref 35–45)
HGB BLD-MCNC: 15.6 G/DL (ref 12–16)
HGB UR QL STRIP: NEGATIVE
KETONES UR QL STRIP.AUTO: NEGATIVE MG/DL
LEUKOCYTE ESTERASE UR QL STRIP.AUTO: NEGATIVE
LIPASE SERPL-CCNC: 214 U/L (ref 73–393)
LYMPHOCYTES # BLD: 1.5 K/UL (ref 0.9–3.6)
LYMPHOCYTES NFR BLD: 22 % (ref 21–52)
MCH RBC QN AUTO: 28.5 PG (ref 24–34)
MCHC RBC AUTO-ENTMCNC: 33.2 G/DL (ref 31–37)
MCV RBC AUTO: 85.8 FL (ref 74–97)
MONOCYTES # BLD: 0.3 K/UL (ref 0.05–1.2)
MONOCYTES NFR BLD: 5 % (ref 3–10)
NEUTS SEG # BLD: 4.8 K/UL (ref 1.8–8)
NEUTS SEG NFR BLD: 72 % (ref 40–73)
NITRITE UR QL STRIP.AUTO: NEGATIVE
PH UR STRIP: 7.5 [PH] (ref 5–8)
PLATELET # BLD AUTO: 320 K/UL (ref 135–420)
PMV BLD AUTO: 12 FL (ref 9.2–11.8)
POTASSIUM SERPL-SCNC: 4.7 MMOL/L (ref 3.5–5.5)
PROT SERPL-MCNC: 8.6 G/DL (ref 6.4–8.2)
PROT UR STRIP-MCNC: NEGATIVE MG/DL
RBC # BLD AUTO: 5.48 M/UL (ref 4.2–5.3)
SODIUM SERPL-SCNC: 139 MMOL/L (ref 136–145)
SP GR UR REFRACTOMETRY: 1.01 (ref 1–1.03)
UROBILINOGEN UR QL STRIP.AUTO: 0.2 EU/DL (ref 0.2–1)
WBC # BLD AUTO: 6.7 K/UL (ref 4.6–13.2)

## 2018-08-23 PROCEDURE — 85025 COMPLETE CBC W/AUTO DIFF WBC: CPT | Performed by: PHYSICIAN ASSISTANT

## 2018-08-23 PROCEDURE — 81003 URINALYSIS AUTO W/O SCOPE: CPT | Performed by: PHYSICIAN ASSISTANT

## 2018-08-23 PROCEDURE — 74177 CT ABD & PELVIS W/CONTRAST: CPT

## 2018-08-23 PROCEDURE — 96361 HYDRATE IV INFUSION ADD-ON: CPT

## 2018-08-23 PROCEDURE — 74011250636 HC RX REV CODE- 250/636: Performed by: PHYSICIAN ASSISTANT

## 2018-08-23 PROCEDURE — 96375 TX/PRO/DX INJ NEW DRUG ADDON: CPT

## 2018-08-23 PROCEDURE — 74011636320 HC RX REV CODE- 636/320: Performed by: EMERGENCY MEDICINE

## 2018-08-23 PROCEDURE — 96376 TX/PRO/DX INJ SAME DRUG ADON: CPT

## 2018-08-23 PROCEDURE — 74011250636 HC RX REV CODE- 250/636: Performed by: COLON & RECTAL SURGERY

## 2018-08-23 PROCEDURE — 83036 HEMOGLOBIN GLYCOSYLATED A1C: CPT | Performed by: COLON & RECTAL SURGERY

## 2018-08-23 PROCEDURE — 96374 THER/PROPH/DIAG INJ IV PUSH: CPT

## 2018-08-23 PROCEDURE — 65270000029 HC RM PRIVATE

## 2018-08-23 PROCEDURE — 83690 ASSAY OF LIPASE: CPT | Performed by: PHYSICIAN ASSISTANT

## 2018-08-23 PROCEDURE — 74011250637 HC RX REV CODE- 250/637: Performed by: COLON & RECTAL SURGERY

## 2018-08-23 PROCEDURE — 80053 COMPREHEN METABOLIC PANEL: CPT | Performed by: PHYSICIAN ASSISTANT

## 2018-08-23 PROCEDURE — 99284 EMERGENCY DEPT VISIT MOD MDM: CPT

## 2018-08-23 RX ORDER — AMLODIPINE BESYLATE 5 MG/1
2.5 TABLET ORAL DAILY
Status: DISCONTINUED | OUTPATIENT
Start: 2018-08-23 | End: 2018-08-23

## 2018-08-23 RX ORDER — SODIUM CHLORIDE 0.9 % (FLUSH) 0.9 %
5-10 SYRINGE (ML) INJECTION EVERY 8 HOURS
Status: DISCONTINUED | OUTPATIENT
Start: 2018-08-23 | End: 2018-08-25 | Stop reason: HOSPADM

## 2018-08-23 RX ORDER — DIPHENHYDRAMINE HCL 25 MG
25 CAPSULE ORAL
Status: DISCONTINUED | OUTPATIENT
Start: 2018-08-23 | End: 2018-08-24 | Stop reason: SDUPTHER

## 2018-08-23 RX ORDER — ONDANSETRON 2 MG/ML
4 INJECTION INTRAMUSCULAR; INTRAVENOUS
Status: COMPLETED | OUTPATIENT
Start: 2018-08-23 | End: 2018-08-23

## 2018-08-23 RX ORDER — BISOPROLOL FUMARATE 5 MG/1
7.5 TABLET ORAL DAILY
Status: DISCONTINUED | OUTPATIENT
Start: 2018-08-24 | End: 2018-08-25 | Stop reason: HOSPADM

## 2018-08-23 RX ORDER — MORPHINE SULFATE 4 MG/ML
4 INJECTION INTRAVENOUS
Status: COMPLETED | OUTPATIENT
Start: 2018-08-23 | End: 2018-08-23

## 2018-08-23 RX ORDER — SODIUM CHLORIDE 0.9 % (FLUSH) 0.9 %
5-10 SYRINGE (ML) INJECTION AS NEEDED
Status: DISCONTINUED | OUTPATIENT
Start: 2018-08-23 | End: 2018-08-25 | Stop reason: HOSPADM

## 2018-08-23 RX ORDER — NALOXONE HYDROCHLORIDE 0.4 MG/ML
0.4 INJECTION, SOLUTION INTRAMUSCULAR; INTRAVENOUS; SUBCUTANEOUS AS NEEDED
Status: DISCONTINUED | OUTPATIENT
Start: 2018-08-23 | End: 2018-08-25 | Stop reason: HOSPADM

## 2018-08-23 RX ORDER — MORPHINE SULFATE 4 MG/ML
2 INJECTION INTRAVENOUS ONCE
Status: COMPLETED | OUTPATIENT
Start: 2018-08-23 | End: 2018-08-23

## 2018-08-23 RX ORDER — AMLODIPINE BESYLATE 2.5 MG/1
2.5 TABLET ORAL DAILY
Status: DISCONTINUED | OUTPATIENT
Start: 2018-08-23 | End: 2018-08-25 | Stop reason: HOSPADM

## 2018-08-23 RX ORDER — BISOPROLOL FUMARATE AND HYDROCHLOROTHIAZIDE 2.5; 6.25 MG/1; MG/1
1 TABLET ORAL DAILY
Status: DISCONTINUED | OUTPATIENT
Start: 2018-08-24 | End: 2018-08-25 | Stop reason: HOSPADM

## 2018-08-23 RX ORDER — DEXTROSE, SODIUM CHLORIDE, AND POTASSIUM CHLORIDE 5; .45; .15 G/100ML; G/100ML; G/100ML
100 INJECTION INTRAVENOUS CONTINUOUS
Status: DISCONTINUED | OUTPATIENT
Start: 2018-08-23 | End: 2018-08-25 | Stop reason: HOSPADM

## 2018-08-23 RX ORDER — ONDANSETRON 2 MG/ML
4 INJECTION INTRAMUSCULAR; INTRAVENOUS
Status: DISCONTINUED | OUTPATIENT
Start: 2018-08-23 | End: 2018-08-25 | Stop reason: HOSPADM

## 2018-08-23 RX ORDER — MORPHINE SULFATE 4 MG/ML
2 INJECTION INTRAVENOUS
Status: DISCONTINUED | OUTPATIENT
Start: 2018-08-23 | End: 2018-08-24

## 2018-08-23 RX ADMIN — MORPHINE SULFATE 2 MG: 4 INJECTION INTRAVENOUS at 19:47

## 2018-08-23 RX ADMIN — MORPHINE SULFATE 4 MG: 4 INJECTION INTRAVENOUS at 15:00

## 2018-08-23 RX ADMIN — ONDANSETRON 4 MG: 2 INJECTION, SOLUTION INTRAMUSCULAR; INTRAVENOUS at 19:47

## 2018-08-23 RX ADMIN — Medication 10 ML: at 22:47

## 2018-08-23 RX ADMIN — DEXTROSE MONOHYDRATE, SODIUM CHLORIDE, AND POTASSIUM CHLORIDE 100 ML/HR: 50; 4.5; 1.49 INJECTION, SOLUTION INTRAVENOUS at 19:42

## 2018-08-23 RX ADMIN — MORPHINE SULFATE 2 MG: 4 INJECTION INTRAVENOUS at 16:54

## 2018-08-23 RX ADMIN — SODIUM CHLORIDE 1000 ML: 900 INJECTION, SOLUTION INTRAVENOUS at 18:28

## 2018-08-23 RX ADMIN — ONDANSETRON 4 MG: 2 INJECTION, SOLUTION INTRAMUSCULAR; INTRAVENOUS at 14:59

## 2018-08-23 RX ADMIN — AMLODIPINE BESYLATE 2.5 MG: 2.5 TABLET ORAL at 22:46

## 2018-08-23 RX ADMIN — DIPHENHYDRAMINE HYDROCHLORIDE 25 MG: 25 CAPSULE ORAL at 22:47

## 2018-08-23 RX ADMIN — SODIUM CHLORIDE 1000 ML: 900 INJECTION, SOLUTION INTRAVENOUS at 14:59

## 2018-08-23 RX ADMIN — IOPAMIDOL 90 ML: 612 INJECTION, SOLUTION INTRAVENOUS at 15:50

## 2018-08-23 NOTE — H&P
[facility]  Colon and Rectal Surgery  [address] [address], [address] [place] [address]                Colon and Rectal Surgery    Subjective:      Emile Schlatter is a 62 y.o. female who presented to ED today with a 1-2 day history of nausea, emesis, and abdominal pain. The pain is located in the periumbilical area diffusely. Pain is described as cramping and colicky and measured 71/38 in intensity earlier today. This has improved since being in ED. She is feeling much better after receiving IVF and is less nauseated. Initial CT scan in ED showed finding concerning for partial small bowel obstruction. The patient denies any rectal bleeding, weight changes, nor any anorectal pain. Patient also denies constipation, diarrhea, bloody stools and fever.     The patient underwent Exploratory laparotomy, Extensive lysis of adhesions, and Small bowel resection with primary anastomosis by Dr. Flor Woods on 2/21/2017 for chronic small-bowel obstruction.       Patient Active Problem List    Diagnosis Date Noted    Bowel obstruction ([address] 08/23/2018    Non-intractable cyclical vomiting with nausea 01/24/2018    Type 2 diabetes mellitus with nephropathy ([address] 01/19/2018    Chronic abdominal pain 03/14/2017    Post-operative pain 03/14/2017    SBO (small bowel obstruction) ([address] 02/21/2017    Osteoarthritis of left knee 06/27/2016    Hypertension 06/27/2016    Hyperlipidemia 06/27/2016    GERD (gastroesophageal reflux disease) 06/27/2016    Asthma 06/27/2016    Type 2 diabetes mellitus ([address] 06/27/2016    Sural neuritis 06/23/2014    Chest pain 04/20/2014    Essential hypertension, benign 02/27/2012     Past Medical History:   Diagnosis Date    Abdominal adhesions     Anemia     Arthritis     all over the body    Asthma     Cocaine abuse     Diabetes mellitus     Dyskinesia     bilateral    Essential hypertension     GERD (gastroesophageal reflux disease)     Hypertension     Menopause     Microhematuria     Stool color black       Past Surgical History:   Procedure Laterality Date    HX CHOLECYSTECTOMY  6/28/10    HX COLONOSCOPY      HX ENDOSCOPY      HX HERNIA REPAIR      HX HYSTERECTOMY      Fibroids    HX KNEE REPLACEMENT Left     HX OOPHORECTOMY  12/2000    HX ORTHOPAEDIC Right     ankle- multiple surgeries    HX SMALL BOWEL RESECTION  12/2000    HX SMALL BOWEL RESECTION  02/21/2017    Dr. Jillian Mark        Social History   Substance Use Topics    Smoking status: Never Smoker    Smokeless tobacco: Never Used    Alcohol use No      Family History   Problem Relation Age of Onset    Hypertension Other      parent    Breast Cancer Other 21    Heart Disease Father     Hypertension Father     Diabetes Father     Diabetes Mother     Hypertension Other      sibling    Diabetes Other      parent    Diabetes Sister     Kidney Disease Brother     Diabetes Brother       Prior to Admission medications    Medication Sig Start Date End Date Taking? Authorizing Provider   traMADol (ULTRAM) 50 mg tablet Take 1 Tab by mouth every six (6) hours as needed for Pain. Max Daily Amount: 200 mg. 8/6/18   Stefanie Murillo MD   [facility]) 500 mg TG24 24 hour tablet take 1 tablet by mouth twice a day 8/3/18   Stefanie Murillo MD   magnesium citrate solution Take 1/3 of bottle every 8 hours until finished or until you have a bowel movement. 8/1/18   Rachelle Null MD   ondansetron (ZOFRAN ODT) 4 mg disintegrating tablet Take 1 Tab by mouth every eight (8) hours as needed for Nausea. 7/29/18   GUILLERMO Zurita   bisoprolol-hydroCHLOROthiazide [facility]) 2.5-6.25 mg per tablet Take 1 Tab by mouth daily. 5/2/18   Stefanie Murillo MD   polyethylene glycol (MIRALAX) 17 gram/dose powder Take 17 g by mouth two (2) times a day.  1 capful with 8 oz of water daily 4/26/18   DO farnaz Martin (SENOKOT) 8.6 mg tablet Take 1 Tab by mouth daily. 4/26/18   Marizol Carl,    amLODIPine (NORVASC) 2.5 mg tablet take 1 tablet by mouth NIGHTLY 2/1/18   Heather Ortega MD   ALLERGY RELIEF, CETIRIZINE, 10 mg tablet take 1 tablet by mouth once daily 12/6/17   Historical Provider   doxepin (SINEQUAN) 10 mg capsule Take 1 Cap by mouth nightly. 1/19/18   Heather Ortega MD   dapagliflozin (FARXIGA) 10 mg tab tablet Take 1 Tab by mouth daily. Indications: type 2 diabetes mellitus 11/22/17   Heather Ortega MD   omeprazole (PRILOSEC) 10 mg capsule Take 1 Cap by mouth daily. 11/22/17   Heather Ortega MD   albuterol (PROVENTIL HFA, VENTOLIN HFA, PROAIR HFA) 90 mcg/actuation inhaler Take 1 Puff by inhalation every six (6) hours as needed for Wheezing. 9/21/17   Heather Ortega MD   atorvastatin (LIPITOR) 40 mg tablet Take 1 Tab by mouth nightly. 7/11/17   Heather Ortega MD   glucose blood VI test strips [facility] ULTRA TEST) strip Check blood glucose as directed 4/27/17   Heather Ortega MD     Current Facility-Administered Medications   Medication Dose Route Frequency    sodium chloride 0.9 % bolus infusion 1,000 mL  1,000 mL IntraVENous ONCE    sodium chloride (NS) flush 5-10 mL  5-10 mL IntraVENous Q8H    sodium chloride (NS) flush 5-10 mL  5-10 mL IntraVENous PRN    morphine injection 2 mg  2 mg IntraVENous Q3H PRN    ondansetron (ZOFRAN) injection 4 mg  4 mg IntraVENous Q4H PRN    naloxone (NARCAN) injection 0.4 mg  0.4 mg IntraVENous PRN    dextrose 5% - 0.45% NaCl with KCl 20 mEq/L infusion  100 mL/hr IntraVENous CONTINUOUS    [START ON 8/24/2018] bisoprolol-hydroCHLOROthiazide (ZIAC) 10-6.25 mg per tablet 1 Tab  1 Tab Oral DAILY    amLODIPine (NORVASC) tablet 2.5 mg  2.5 mg Oral DAILY     Current Outpatient Prescriptions   Medication Sig    traMADol (ULTRAM) 50 mg tablet Take 1 Tab by mouth every six (6) hours as needed for Pain.  Max Daily Amount: 200 mg.    metFORMIN (GLUMETZA ER) 500 mg TG24 24 hour tablet take 1 tablet by mouth twice a day    magnesium citrate solution Take 1/3 of bottle every 8 hours until finished or until you have a bowel movement.  ondansetron (ZOFRAN ODT) 4 mg disintegrating tablet Take 1 Tab by mouth every eight (8) hours as needed for Nausea.  bisoprolol-hydroCHLOROthiazide (ZIAC) 2.5-6.25 mg per tablet Take 1 Tab by mouth daily.  polyethylene glycol (MIRALAX) 17 gram/dose powder Take 17 g by mouth two (2) times a day. 1 capful with 8 oz of water daily    senna (SENOKOT) 8.6 mg tablet Take 1 Tab by mouth daily.  amLODIPine (NORVASC) 2.5 mg tablet take 1 tablet by mouth NIGHTLY    ALLERGY RELIEF, CETIRIZINE, 10 mg tablet take 1 tablet by mouth once daily    doxepin (SINEQUAN) 10 mg capsule Take 1 Cap by mouth nightly.  dapagliflozin (FARXIGA) 10 mg tab tablet Take 1 Tab by mouth daily. Indications: type 2 diabetes mellitus    omeprazole (PRILOSEC) 10 mg capsule Take 1 Cap by mouth daily.  albuterol (PROVENTIL HFA, VENTOLIN HFA, PROAIR HFA) 90 mcg/actuation inhaler Take 1 Puff by inhalation every six (6) hours as needed for Wheezing.  atorvastatin (LIPITOR) 40 mg tablet Take 1 Tab by mouth nightly.  glucose blood VI test strips (ONETOUCH ULTRA TEST) strip Check blood glucose as directed      Allergies   Allergen Reactions    Macrodantin [Nitrofurantoin Macrocrystalline] Itching and Other (comments)    Tape [Adhesive] Other (comments)     Paper tape-- feels like burning skin  Burned skin when had wound vac       Review of Systems:    Pertinent items are noted in the History of Present Illness. Objective:        Visit Vitals    BP (!) 130/94 (BP 1 Location: Right arm)    Pulse 88    Temp 98.6 °F (37 °C)    Resp 16    SpO2 100%       Physical Exam:   GENERAL: alert, cooperative, no distress, appears stated age  LUNG: clear to auscultation bilaterally  HEART: regular rate and rhythm  ABDOMEN: soft, moderately distended. Bowel sounds present. No masses,  no organomegaly. Incision site intact, no hernia. Moderate tenderness diffusely in all quadrants without any peritoneal signs. EXTREMITIES:  extremities normal, atraumatic, no cyanosis or edema     Imaging:  reviewed  CT of Abdomen and Pelvis (8/23/2018)  IMPRESSION:  1. Interval development several mildly dilated loops of jejunum with air-fluid  levels left midabdomen without wall thickening. FINDINGS suggestive of either  partial mechanical obstruction or localized ileus. 2. Questionable mild colitis descending and sigmoid colon although may be  exaggerated due to underdistended bowel. 3. Colonic diverticulosis but no evidence of acute diverticulitis. 4. Stable 4 cm left adnexal cyst.  5. Stable additional ancillary findings as above.       Lab/Data Review:  CMP:   Lab Results   Component Value Date/Time     08/23/2018 02:07 PM    K 4.7 08/23/2018 02:07 PM     08/23/2018 02:07 PM    CO2 28 08/23/2018 02:07 PM    AGAP 10 08/23/2018 02:07 PM     (H) 08/23/2018 02:07 PM    BUN 13 08/23/2018 02:07 PM    CREA 1.17 08/23/2018 02:07 PM    GFRAA 58 (L) 08/23/2018 02:07 PM    GFRNA 48 (L) 08/23/2018 02:07 PM    CA 10.9 (H) 08/23/2018 02:07 PM    ALB 3.8 08/23/2018 02:07 PM    TP 8.6 (H) 08/23/2018 02:07 PM    GLOB 4.8 (H) 08/23/2018 02:07 PM    AGRAT 0.8 08/23/2018 02:07 PM    SGOT 16 08/23/2018 02:07 PM    ALT 20 08/23/2018 02:07 PM     CBC:   Lab Results   Component Value Date/Time    WBC 6.7 08/23/2018 02:07 PM    HGB 15.6 08/23/2018 02:07 PM    HCT 47.0 (H) 08/23/2018 02:07 PM     08/23/2018 02:07 PM     Recent Glucose Results:   Lab Results   Component Value Date/Time     (H) 08/23/2018 02:07 PM     Liver Panel:   Lab Results   Component Value Date/Time    ALB 3.8 08/23/2018 02:07 PM    TP 8.6 (H) 08/23/2018 02:07 PM    GLOB 4.8 (H) 08/23/2018 02:07 PM    AGRAT 0.8 08/23/2018 02:07 PM    SGOT 16 08/23/2018 02:07 PM    ALT 20 08/23/2018 02:07 PM     (H) 08/23/2018 02:07 PM     Pancreatic Markers:   Lab Results   Component Value Date/Time    LPSE 214 08/23/2018 02:07 PM         Assessment:     Abdominal pain with signs/symptoms of partial small bowel obstruction. No evidence of acute abdomen. Plan:     Admit for inpatient management. Bowel rest, IVF, and observation.     JULIAN in Keron Castro MD, FACS, FASCRS  Colon and Rectal Surgery  [facility]  Office [phone]  Fax     [phone]  8/23/2018  7:03 PM

## 2018-08-23 NOTE — IP AVS SNAPSHOT
[facility] 
 
 
 [address] Patient: Lore Chinchilla MRN: [id number] OAV:3/8[birth year] About your hospitalization You were admitted on:  August 23, 2018 You last received care in the:  [address] [place] [address],[address] You were discharged on:  August 25, 2018 Why you were hospitalized Your primary diagnosis was:  Not on File Your diagnoses also included: Bowel Obstruction (Hcc) Follow-up Information Follow up With Details Comments Contact Info Leo Hurd MD   [id number] [place] [id number] 
[phone] Your Scheduled Appointments Thursday September 06, 2018  9:30 AM EDT New Patient with Jenny Santos MD  
[address] [place] [address] [facility] [address]  
[phone] Friday September 21, 2018 10:00 AM EDT  
HOME SLEEP STUDY with [facility] SLEEP RM 2  
700 [facility] SLEEP LAB DEPARTMENT [address] [place] [address]) [address]  
[phone] Friday September 21, 2018 10:15 AM EDT  
HOME SLEEP STUDY RETURN with [address] [facility] SLEEP RM 2  
700 [facility] SLEEP LAB DEPARTMENT [address] [place] [address]) [address]  
[phone] Discharge Orders None A check nadia indicates which time of day the medication should be taken. My Medications CONTINUE taking these medications Instructions Each Dose to Equal  
 Morning Noon Evening Bedtime  
 albuterol 90 mcg/actuation inhaler Commonly known as:  PROVENTIL HFA, VENTOLIN HFA, PROAIR HFA Your last dose was: Your next dose is: Take 1 Puff by inhalation every six (6) hours as needed for Wheezing. 1 Puff ALLERGY RELIEF (CETIRIZINE) 10 mg tablet Generic drug:  cetirizine Your last dose was: Your next dose is:    
   
   
 take 1 tablet by mouth once daily  
     
   
   
   
  
 amLODIPine 2.5 mg tablet Commonly known as:  Cornelius Harder Your last dose was: Your next dose is:    
   
   
 take 1 tablet by mouth NIGHTLY  
     
   
   
   
  
 atorvastatin 40 mg tablet Commonly known as:  LIPITOR Your last dose was: Your next dose is: Take 1 Tab by mouth nightly. 40 mg  
    
   
   
   
  
 bisoprolol-hydroCHLOROthiazide 2.5-6.25 mg per tablet Commonly known as:  [facility] Your last dose was: Your next dose is: Take 1 Tab by mouth daily. 1 Tab  
    
   
   
   
  
 dapagliflozin 10 mg Tab tablet Commonly known as:  [employer] Your last dose was: Your next dose is: Take 1 Tab by mouth daily. Indications: type 2 diabetes mellitus 10 mg  
    
   
   
   
  
 doxepin 10 mg capsule Commonly known as:  SINEquan Your last dose was: Your next dose is: Take 1 Cap by mouth nightly. 10 mg  
    
   
   
   
  
 glucose blood VI test strips strip Commonly known as:  ONETOUCH ULTRA TEST Your last dose was: Your next dose is:    
   
   
 Check blood glucose as directed  
     
   
   
   
  
 magnesium citrate solution Your last dose was: Your next dose is: Take 1/3 of bottle every 8 hours until finished or until you have a bowel movement. metFORMIN 500 mg Tg24 24 hour tablet Commonly known asVergie Felty ER Your last dose was: Your next dose is:    
   
   
 take 1 tablet by mouth twice a day  
     
   
   
   
  
 omeprazole 10 mg capsule Commonly known as:  PRILOSEC Your last dose was: Your next dose is: Take 1 Cap by mouth daily. 10 mg  
    
   
   
   
  
 ondansetron 4 mg disintegrating tablet Commonly known as:  ZOFRAN ODT Your last dose was: Your next dose is: Take 1 Tab by mouth every eight (8) hours as needed for Nausea. 4 mg  
    
   
   
   
  
 polyethylene glycol 17 gram/dose powder Commonly known as:  Patti Petty Your last dose was: Your next dose is: Take 17 g by mouth two (2) times a day. 1 capful with 8 oz of water daily 17 g  
    
   
   
   
  
 senna 8.6 mg tablet Commonly known as:  Stan Mabry Your last dose was: Your next dose is: Take 1 Tab by mouth daily. 1 Tab  
    
   
   
   
  
 traMADol 50 mg tablet Commonly known as:  ULTRAM  
   
Your last dose was: Your next dose is: Take 1 Tab by mouth every six (6) hours as needed for Pain. Max Daily Amount: 200 mg.  
 50 mg Opioid Education Prescription Opioids: What You Need to Know: 
 
Prescription opioids can be used to help relieve moderate-to-severe pain and are often prescribed following a surgery or injury, or for certain health conditions. These medications can be an important part of treatment but also come with serious risks. Opioids are strong pain medicines. Examples include hydrocodone, oxycodone, fentanyl, and morphine. Heroin is an example of an illegal opioid. It is important to work with your health care provider to make sure you are getting the safest, most effective care. WHAT ARE THE RISKS AND SIDE EFFECTS OF OPIOID USE? Prescription opioids carry serious risks of addiction and overdose, especially with prolonged use. An opioid overdose, often marked by slow breathing, can cause sudden death. The use of prescription opioids can have a number of side effects as well, even when taken as directed. · Tolerance-meaning you might need to take more of a medication for the same pain relief · Physical dependence-meaning you have symptoms of withdrawal when the medication is stopped.   Withdrawal symptoms can include nausea, sweating, chills, diarrhea, stomach cramps, and muscle aches. Withdrawal can last up to several weeks, depending on which drug you took and how long you took it. · Increased sensitivity to pain · Constipation · Nausea, vomiting, and dry mouth · Sleepiness and dizziness · Confusion · Depression · Low levels of testosterone that can result in lower sex drive, energy, and strength · Itching and sweating RISKS ARE GREATER WITH:      
· History of drug misuse, substance use disorder, or overdose · Mental health conditions (such as depression or anxiety) · Sleep apnea · Older age (72 years or older) · Pregnancy Avoid alcohol while taking prescription opioids. Also, unless specifically advised by your health care provider, medications to avoid include: · Benzodiazepines (such as Xanax or Valium) · Muscle relaxants (such as Soma or Flexeril) · Hypnotics (such as Ambien or Lunesta) · Other prescription opioids KNOW YOUR OPTIONS Talk to your health care provider about ways to manage your pain that don't involve prescription opioids. Some of these options may actually work better and have fewer risks and side effects. Options may include: 
· Pain relievers such as acetaminophen, ibuprofen, and naproxen · Some medications that are also used for depression or seizures · Physical therapy and exercise · Counseling to help patients learn how to cope better with triggers of pain and stress. · Application of heat or cold compress · Massage therapy · Relaxation techniques Be Informed Make sure you know the name of your medication, how much and how often to take it, and its potential risks & side effects. IF YOU ARE PRESCRIBED OPIOIDS FOR PAIN: 
· Never take opioids in greater amounts or more often than prescribed. Remember the goal is not to be pain-free but to manage your pain at a tolerable level. · Follow up with your primary care provider to: · Work together to create a plan on how to manage your pain. · Talk about ways to help manage your pain that don't involve prescription opioids. · Talk about any and all concerns and side effects. · Help prevent misuse and abuse. · Never sell or share prescription opioids · Help prevent misuse and abuse. · Store prescription opioids in a secure place and out of reach of others (this may include visitors, children, friends, and family). · Safely dispose of unused/unwanted prescription opioids: Find your community drug take-back program or your pharmacy mail-back program, or flush them down the toilet, following guidance from the Food and Drug Administration (www.fda.gov/Drugs/ResourcesForYou). · Visit www.cdc.gov/drugoverdose to learn about the risks of opioid abuse and overdose. · If you believe you may be struggling with addiction, tell your health care provider and ask for guidance or call [employer] at 0-287-338-RAFF. Discharge Instructions Instructions Patient: Frank Reeves MRN: [id number]  SSN: [id number] YOB: 1960  Age: 62 y.o. Sex: female Activity · As tolerated, walking encourage, stairs are okay · Avoid strenuous activities - no lifting anything heavier than 15 pounds. · You may shower at home Diet · Regular diet Call your doctor if 
· Excessive bleeding that does not stop after holding mild pressure over the area · Temperature of 101 degrees F or above · Redness,excessive swelling or bruising, and/or green or yellow, smelly discharge from incision · If nausea and vomiting continues Follow-Up Phone Calls · Call the office at [phone] to make your follow-up appointment Appointment date/time: Only if needed. Demihart Announcement  We are excited to announce that we are making your provider's discharge notes available to you in [employer]. You will see these notes when they are completed and signed by the physician that discharged you from your recent hospital stay. If you have any questions or concerns about any information you see in [employer], please call the Health Information Department where you were seen or reach out to your Primary Care Provider for more information about your plan of care. Introducing [facility]! Arjun Burnett introduces [employer] patient portal. Now you can access parts of your medical record, email your doctor's office, and request medication refills online. 1. In your internet browser, go to [email or web address] 2. Click on the First Time User? Click Here link in the Sign In box. You will see the New Member Sign Up page. 3. Enter your [employer] Access Code exactly as it appears below. You will not need to use this code after youve completed the sign-up process. If you do not sign up before the expiration date, you must request a new code. · [employer] Access Code: [email or web address] Expires: 9/10/2018  3:19 PM 
 
4. Enter the last four digits of your Social Security Number (xxxx) and Date of Birth (mm/dd/yyyy) as indicated and click Submit. You will be taken to the next sign-up page. 5. Create a [employer] ID. This will be your [employer] login ID and cannot be changed, so think of one that is secure and easy to remember. 6. Create a [employer] password. You can change your password at any time. 7. Enter your Password Reset Question and Answer. This can be used at a later time if you forget your password. 8. Enter your e-mail address. You will receive e-mail notification when new information is available in [address] 9. Click Sign Up. You can now view and download portions of your medical record. 10. Click the Download Summary menu link to download a portable copy of your medical information. If you have questions, please visit the Frequently Asked Questions section of the MyChart website. Remember, [employer] is NOT to be used for urgent needs. For medical emergencies, dial 911. Now available from your iPhone and Android! Introducing Eduardo Wing As a [employer] patient, I wanted to make you aware of our electronic visit tool called [employer]. [employer] 24/7 allows you to connect within minutes with a medical provider 24 hours a day, seven days a week via a mobile device or tablet or logging into a secure website from your computer. You can access Eduardo Wing from anywhere in the [place]. A virtual visit might be right for you when you have a simple condition and feel like you just dont want to get out of bed, or cant get away from work for an appointment, when your regular [employer] provider is not available (evenings, weekends or holidays), or when youre out of town and need minor care. Electronic visits cost only $49 and if the [employer] 24/7 provider determines a prescription is needed to treat your condition, one can be electronically transmitted to a nearby pharmacy*. Please take a moment to enroll today if you have not already done so. The enrollment process is free and takes just a few minutes. To enroll, please download the [employer] 24/7 curtis to your tablet or phone, or visit [email or web address] to enroll on your computer. And, as an [address] [place] [address] patient with a [employer] account, the results of your visits will be scanned into your electronic medical record and your primary care provider will be able to view the scanned results. We urge you to continue to see your regular [employer] provider for your ongoing medical care.   And while your primary care provider may not be the one available when you seek a Eduardo Wing virtual visit, the peace of mind you get from getting a real diagnosis real time can be priceless. For more information on Eduardo Wing, view our Frequently Asked Questions (FAQs) at [email or web address]. Sincerely, 
 
Mariusz Quintana MD 
Chief Medical Officer Chato Carpio *:  certain medications cannot be prescribed via Eduardo Wing Providers Seen During Your Hospitalization Provider Specialty Primary office phone Brooks Mackay MD Emergency Medicine [phone] Sydni Gilbert MD Emergency Medicine [phone] Rupali Herrmann MD Colon and Rectal Surgery [phone] Your Primary Care Physician (PCP) Primary Care Physician Office Phone Office Fax [address] You are allergic to the following Allergen Reactions Macrodantin (Nitrofurantoin Macrocrystalline) Itching Other (comments) Tape (Adhesive) Other (comments) Paper tape-- feels like burning skin Burned skin when had wound vac Recent Documentation OB Status Smoking Status Hysterectomy Never Smoker Emergency Contacts Name Discharge Info Relation Home Work Mobile [facility] DISCHARGE CAREGIVER [3] Spouse [3] [phone] [facility] DISCHARGE CAREGIVER [3] Mother [14] [phone] Patient Belongings The following personal items are in your possession at time of discharge: 
  Dental Appliances: None  Visual Aid: Glasses, With patient      Home Medications: None   Jewelry: None  Clothing: At bedside    Other Valuables: None Discharge Instructions Attachments/References BOWEL BLOCKAGE (INTESTINAL OBSTRUCTION) (ENGLISH) Patient Handouts Bowel Blockage (Intestinal Obstruction): Care Instructions Your Care Instructions A bowel blockage, also called an intestinal obstruction, can prevent gas, fluids, or solids from moving through the intestines normally. It can cause constipation and, rarely, diarrhea. You may have pain, nausea, vomiting, and cramping. Most of the time, complete blockages require a stay in the hospital and possibly surgery. But if your bowel is only partly blocked, your doctor may tell you to wait until it clears on its own and you are able to pass gas and stool. If so, there are things you can do at home to help make you feel better. If you have had surgery for a bowel blockage, there are things you can do at home to make sure you heal well. You can also make some changes to keep your bowel from becoming blocked again. Follow-up care is a key part of your treatment and safety. Be sure to make and go to all appointments, and call your doctor if you are having problems. It's also a good idea to know your test results and keep a list of the medicines you take. How can you care for yourself at home? If your doctor has told you to wait at home for a blockage to clear on its own: · Follow your doctor's instructions. These may include eating a liquid diet to avoid complete blockage. · Be safe with medicines. Take your medicines exactly as prescribed. Call your doctor if you think you are having a problem with your medicine. · Put a heating pad set on low on your belly to relieve mild cramps and pain. To prevent another blockage · Try to eat smaller amounts of food more often. For example, have 5 or 6 small meals throughout the day instead of 2 or 3 large meals. · Chew your food very well. Try to chew each bite about 20 times or until it is liquid. · Avoid high-fiber foods and raw fruits and vegetables with skins, husks, strings, or seeds. These can form a ball of undigested material that can cause a blockage if a part of your bowel is scarred or narrowed. · Check with your doctor before you eat whole-grain products or use a fiber supplement such as Citrucel or Metamucil. · To help you have regular bowel movements, eat at regular times, do not strain during a bowel movement, and drink at least 8 to 10 glasses of water each day. If you have kidney, heart, or liver disease and have to limit fluids, talk with your doctor or before you increase the amount of fluids you drink. · Drink high-calorie liquid formulas if your doctor says to. Severe symptoms may make it hard for your body to take in vitamins and minerals. · Get regular exercise. It helps you digest your food better. Get at least 30 minutes of physical activity on most days of the week. Walking is a good choice. When should you call for help? Call your doctor now or seek immediate medical care if: 
  · You have a fever.  
  · You are vomiting.  
  · You have new or worse belly pain.  
  · You cannot pass stools or gas.  
 Watch closely for changes in your health, and be sure to contact your doctor if you have any problems. Where can you learn more? Go to http://betzy-didi.info/. Enter W900 in the search box to learn more about \"Bowel Blockage (Intestinal Obstruction): Care Instructions. \" Current as of: May 12, 2017 Content Version: 11.7 © 2291-5125 [employer]. Care instructions adapted under license by [employer] (which disclaims liability or warranty for this information). If you have questions about a medical condition or this instruction, always ask your healthcare professional. [address] any warranty or liability for your use of this information. Please provide this summary of care documentation to your next provider. Signatures-by signing, you are acknowledging that this After Visit Summary has been reviewed with you and you have received a copy. Patient Signature:  ____________________________________________________________ Date:  ____________________________________________________________ `    
    
 Provider Signature:  ____________________________________________________________ Date:  ____________________________________________________________

## 2018-08-23 NOTE — IP AVS SNAPSHOT
[facility] 
 
 
 [address] [place] [address] Patient: Lester Saldivar MRN: [id number] M:[birth year] A check nadia indicates which time of day the medication should be taken. My Medications CONTINUE taking these medications Instructions Each Dose to Equal  
 Morning Noon Evening Bedtime  
 albuterol 90 mcg/actuation inhaler Commonly known as:  PROVENTIL HFA, VENTOLIN HFA, PROAIR HFA Your last dose was: Your next dose is: Take 1 Puff by inhalation every six (6) hours as needed for Wheezing. 1 Puff ALLERGY RELIEF (CETIRIZINE) 10 mg tablet Generic drug:  cetirizine Your last dose was: Your next dose is:    
   
   
 take 1 tablet by mouth once daily  
     
   
   
   
  
 amLODIPine 2.5 mg tablet Commonly known as:  Renée Meagan Your last dose was: Your next dose is:    
   
   
 take 1 tablet by mouth NIGHTLY  
     
   
   
   
  
 atorvastatin 40 mg tablet Commonly known as:  LIPITOR Your last dose was: Your next dose is: Take 1 Tab by mouth nightly. 40 mg  
    
   
   
   
  
 bisoprolol-hydroCHLOROthiazide 2.5-6.25 mg per tablet Commonly known as:  [facility] Your last dose was: Your next dose is: Take 1 Tab by mouth daily. 1 Tab  
    
   
   
   
  
 dapagliflozin 10 mg Tab tablet Commonly known as:  [employer] Your last dose was: Your next dose is: Take 1 Tab by mouth daily. Indications: type 2 diabetes mellitus 10 mg  
    
   
   
   
  
 doxepin 10 mg capsule Commonly known as:  SINEquan Your last dose was: Your next dose is: Take 1 Cap by mouth nightly. 10 mg  
    
   
   
   
  
 glucose blood VI test strips strip Commonly known as:  ONETOUCH ULTRA TEST Your last dose was: Your next dose is: Check blood glucose as directed  
     
   
   
   
  
 magnesium citrate solution Your last dose was: Your next dose is: Take 1/3 of bottle every 8 hours until finished or until you have a bowel movement. metFORMIN 500 mg Tg24 24 hour tablet Commonly known as[facility] Your last dose was: Your next dose is:    
   
   
 take 1 tablet by mouth twice a day  
     
   
   
   
  
 omeprazole 10 mg capsule Commonly known as:  PRILOSEC Your last dose was: Your next dose is: Take 1 Cap by mouth daily. 10 mg  
    
   
   
   
  
 ondansetron 4 mg disintegrating tablet Commonly known as:  ZOFRAN ODT Your last dose was: Your next dose is: Take 1 Tab by mouth every eight (8) hours as needed for Nausea. 4 mg  
    
   
   
   
  
 polyethylene glycol 17 gram/dose powder Commonly known as:  Lelan Situ Your last dose was: Your next dose is: Take 17 g by mouth two (2) times a day. 1 capful with 8 oz of water daily 17 g  
    
   
   
   
  
 senna 8.6 mg tablet Commonly known as:  Stan Mabry Your last dose was: Your next dose is: Take 1 Tab by mouth daily. 1 Tab  
    
   
   
   
  
 traMADol 50 mg tablet Commonly known as:  ULTRAM  
   
Your last dose was: Your next dose is: Take 1 Tab by mouth every six (6) hours as needed for Pain.  Max Daily Amount: 200 mg.  
 50 mg

## 2018-08-23 NOTE — ED PROVIDER NOTES
EMERGENCY DEPARTMENT HISTORY AND PHYSICAL EXAM    Date: 8/23/2018  Patient Name: Jonna Pool    History of Presenting Illness     Chief Complaint   Patient presents with    Abdominal Pain         History Provided By: Patient    Chief Complaint: right flank pain, right groin pain  Duration: 2 days  Timing:  acute  Quality:  Aching, sharp  Severity: severe  Modifying Factors: n/a  Associated Symptoms: no associated sx      Additional History (Context): Jonna Pool is a 62 y.o. female with h/o anemia, asthma, DM, HTN, GERD, arthritis, cocaine abuse who presents with right flank pain since yesterday. Pt states pain started in right flank and radiates to right lower abdomen. Pain is sharp and dull. Endores nausea, vomiting and diarrhea with pain. Pt states she was seen in ED for the same last week. States she has had non bilious and non bloody vomiting episodes and few non bloody diarrhea episodes. Pt denies fever, chills, cp, sob, back pain, urinary sx, vaginal discharge. Past Surgical History:   Procedure Laterality Date    HX CHOLECYSTECTOMY  6/28/10    HX COLONOSCOPY      HX ENDOSCOPY      HX HERNIA REPAIR      HX HYSTERECTOMY      Fibroids    HX KNEE REPLACEMENT Left     HX OOPHORECTOMY  12/2000    HX ORTHOPAEDIC Right     ankle- multiple surgeries    HX SMALL BOWEL RESECTION  12/2000    HX SMALL BOWEL RESECTION  02/21/2017    Dr. Humphrey Collins         PCP: Janice Peng MD    Current Outpatient Prescriptions   Medication Sig Dispense Refill    traMADol (ULTRAM) 50 mg tablet Take 1 Tab by mouth every six (6) hours as needed for Pain. Max Daily Amount: 200 mg. 12 Tab 0    metFORMIN (GLUMETZA ER) 500 mg TG24 24 hour tablet take 1 tablet by mouth twice a day 60 Tab 2    magnesium citrate solution Take 1/3 of bottle every 8 hours until finished or until you have a bowel movement.  1 Bottle 0    ondansetron (ZOFRAN ODT) 4 mg disintegrating tablet Take 1 Tab by mouth every eight (8) hours as needed for Nausea. 12 Tab 0    bisoprolol-hydroCHLOROthiazide (ZIAC) 2.5-6.25 mg per tablet Take 1 Tab by mouth daily. 90 Tab 3    polyethylene glycol (MIRALAX) 17 gram/dose powder Take 17 g by mouth two (2) times a day. 1 capful with 8 oz of water daily 119 g 0    senna (SENOKOT) 8.6 mg tablet Take 1 Tab by mouth daily. 30 Tab 0    amLODIPine (NORVASC) 2.5 mg tablet take 1 tablet by mouth NIGHTLY 90 Tab 1    ALLERGY RELIEF, CETIRIZINE, 10 mg tablet take 1 tablet by mouth once daily  0    doxepin (SINEQUAN) 10 mg capsule Take 1 Cap by mouth nightly. 30 Cap 11    dapagliflozin (FARXIGA) 10 mg tab tablet Take 1 Tab by mouth daily. Indications: type 2 diabetes mellitus 90 Tab 3    omeprazole (PRILOSEC) 10 mg capsule Take 1 Cap by mouth daily. 30 Cap 11    albuterol (PROVENTIL HFA, VENTOLIN HFA, PROAIR HFA) 90 mcg/actuation inhaler Take 1 Puff by inhalation every six (6) hours as needed for Wheezing. 1 Inhaler 0    atorvastatin (LIPITOR) 40 mg tablet Take 1 Tab by mouth nightly.  90 Tab 3    glucose blood VI test strips (ONETOUCH ULTRA TEST) strip Check blood glucose as directed 100 Strip 1       Past History     Past Medical History:  Past Medical History:   Diagnosis Date    Abdominal adhesions     Anemia     Arthritis     all over the body    Asthma     Cocaine abuse     Diabetes mellitus     Dyskinesia     bilateral    Essential hypertension     GERD (gastroesophageal reflux disease)     Hypertension     Menopause     Microhematuria     Stool color black        Past Surgical History:  Past Surgical History:   Procedure Laterality Date    HX CHOLECYSTECTOMY  6/28/10    HX COLONOSCOPY      HX ENDOSCOPY      HX HERNIA REPAIR      HX HYSTERECTOMY      Fibroids    HX KNEE REPLACEMENT Left     HX OOPHORECTOMY  12/2000    HX ORTHOPAEDIC Right     ankle- multiple surgeries    HX SMALL BOWEL RESECTION  12/2000    HX SMALL BOWEL RESECTION  02/21/2017 Dr. Miguel Alberta         Family History:  Family History   Problem Relation Age of Onset    Hypertension Other      parent    Breast Cancer Other 21    Heart Disease Father     Hypertension Father     Diabetes Father     Diabetes Mother     Hypertension Other      sibling    Diabetes Other      parent    Diabetes Sister     Kidney Disease Brother     Diabetes Brother        Social History:  Social History   Substance Use Topics    Smoking status: Never Smoker    Smokeless tobacco: Never Used    Alcohol use No       Allergies: Allergies   Allergen Reactions    Macrodantin [Nitrofurantoin Macrocrystalline] Itching and Other (comments)    Tape [Adhesive] Other (comments)     Paper tape-- feels like burning skin  Burned skin when had wound vac         Review of Systems   Review of Systems   Constitutional: Negative for chills and fever. HENT: Negative for congestion. Respiratory: Negative for cough and wheezing. Cardiovascular: Negative for chest pain and leg swelling. Gastrointestinal: Positive for abdominal pain, diarrhea, nausea and vomiting. Negative for constipation. Genitourinary: Negative. Musculoskeletal: Negative. Skin: Negative. Neurological: Negative for dizziness, seizures, weakness and headaches. Hematological: Negative. Psychiatric/Behavioral: Negative. All other systems reviewed and are negative. All Other Systems Negative  Physical Exam     Vitals:    08/23/18 1338 08/23/18 1648   BP: (!) 133/102 (!) 130/94   Pulse: 90 88   Resp: 16    Temp: 98.6 °F (37 °C)    SpO2: 100%      Physical Exam   Constitutional: She is oriented to person, place, and time. She appears well-developed and well-nourished. No distress. NAD, well hydrated, non toxic     HENT:   Head: Normocephalic and atraumatic. Nose: Nose normal.   Mouth/Throat: Oropharynx is clear and moist. No oropharyngeal exudate.    Eyes: Conjunctivae and EOM are normal. Pupils are equal, round, and reactive to light. Neck: Normal range of motion. Neck supple. Cardiovascular: Normal rate, regular rhythm and normal heart sounds. No murmur heard. Pulmonary/Chest: Effort normal and breath sounds normal. No respiratory distress. She has no wheezes. She has no rales. Abdominal: Soft. She exhibits no distension and no mass. There is no hepatosplenomegaly, splenomegaly or hepatomegaly. There is tenderness in the right lower quadrant. There is no rigidity, no rebound, no guarding, no CVA tenderness, no tenderness at McBurney's point and negative Feldman's sign. No hernia. Hernia confirmed negative in the ventral area. Musculoskeletal: Normal range of motion. Lymphadenopathy:     She has no cervical adenopathy. Neurological: She is alert and oriented to person, place, and time. No cranial nerve deficit. Coordination normal.   Skin: Skin is warm. No rash noted. She is not diaphoretic. Psychiatric: She has a normal mood and affect. Her behavior is normal.   Nursing note and vitals reviewed. Diagnostic Study Results     Labs -     Recent Results (from the past 12 hour(s))   CBC WITH AUTOMATED DIFF    Collection Time: 08/23/18  2:07 PM   Result Value Ref Range    WBC 6.7 4.6 - 13.2 K/uL    RBC 5.48 (H) 4.20 - 5.30 M/uL    HGB 15.6 12.0 - 16.0 g/dL    HCT 47.0 (H) 35.0 - 45.0 %    MCV 85.8 74.0 - 97.0 FL    MCH 28.5 24.0 - 34.0 PG    MCHC 33.2 31.0 - 37.0 g/dL    RDW 14.1 11.6 - 14.5 %    PLATELET 601 460 - 802 K/uL    MPV 12.0 (H) 9.2 - 11.8 FL    NEUTROPHILS 72 40 - 73 %    LYMPHOCYTES 22 21 - 52 %    MONOCYTES 5 3 - 10 %    EOSINOPHILS 1 0 - 5 %    BASOPHILS 0 0 - 2 %    ABS. NEUTROPHILS 4.8 1.8 - 8.0 K/UL    ABS. LYMPHOCYTES 1.5 0.9 - 3.6 K/UL    ABS. MONOCYTES 0.3 0.05 - 1.2 K/UL    ABS. EOSINOPHILS 0.1 0.0 - 0.4 K/UL    ABS.  BASOPHILS 0.0 0.0 - 0.1 K/UL    DF AUTOMATED     LIPASE    Collection Time: 08/23/18  2:07 PM   Result Value Ref Range    Lipase 214 73 - 393 U/L METABOLIC PANEL, COMPREHENSIVE    Collection Time: 08/23/18  2:07 PM   Result Value Ref Range    Sodium 139 136 - 145 mmol/L    Potassium 4.7 3.5 - 5.5 mmol/L    Chloride 101 100 - 108 mmol/L    CO2 28 21 - 32 mmol/L    Anion gap 10 3.0 - 18 mmol/L    Glucose 176 (H) 74 - 99 mg/dL    BUN 13 7.0 - 18 MG/DL    Creatinine 1.17 0.6 - 1.3 MG/DL    BUN/Creatinine ratio 11 (L) 12 - 20      GFR est AA 58 (L) >60 ml/min/1.73m2    GFR est non-AA 48 (L) >60 ml/min/1.73m2    Calcium 10.9 (H) 8.5 - 10.1 MG/DL    Bilirubin, total 0.3 0.2 - 1.0 MG/DL    ALT (SGPT) 20 13 - 56 U/L    AST (SGOT) 16 15 - 37 U/L    Alk. phosphatase 217 (H) 45 - 117 U/L    Protein, total 8.6 (H) 6.4 - 8.2 g/dL    Albumin 3.8 3.4 - 5.0 g/dL    Globulin 4.8 (H) 2.0 - 4.0 g/dL    A-G Ratio 0.8 0.8 - 1.7     URINALYSIS W/ RFLX MICROSCOPIC    Collection Time: 08/23/18  2:15 PM   Result Value Ref Range    Color YELLOW      Appearance CLEAR      Specific gravity 1.014 1.005 - 1.030      pH (UA) 7.5 5.0 - 8.0      Protein NEGATIVE  NEG mg/dL    Glucose >1000 (A) NEG mg/dL    Ketone NEGATIVE  NEG mg/dL    Bilirubin NEGATIVE  NEG      Blood NEGATIVE  NEG      Urobilinogen 0.2 0.2 - 1.0 EU/dL    Nitrites NEGATIVE  NEG      Leukocyte Esterase NEGATIVE  NEG         Radiologic Studies -   CT ABD PELV W CONT   Final Result        CT Results  (Last 48 hours)               08/23/18 1601  CT ABD PELV W CONT Final result    Impression:  IMPRESSION:       1. Interval development several mildly dilated loops of jejunum with air-fluid   levels left midabdomen without wall thickening. FINDINGS suggestive of either   partial mechanical obstruction or localized ileus. 2. Questionable mild colitis descending and sigmoid colon although may be   exaggerated due to underdistended bowel. 3. Colonic diverticulosis but no evidence of acute diverticulitis. 4. Stable 4 cm left adnexal cyst.       5. Stable additional ancillary findings as above. Narrative:  COMPARISON: 8/1/2018. INDICATION: Abdominal pain, nausea vomiting and diarrhea       TECHNIQUE: Standard helical CT performed through the abdomen and pelvis with IV   contrast.  Coronal and sagittal reformations were generated. One or more dose   reduction techniques were used on this CT: automated exposure control,   adjustment of the mAs and/or kVp according to patient's size, and iterative   reconstruction techniques. The specific techniques utilized on this CT exam have   been documented in the patient's electronic medical record.           ============       FINDINGS:       INFERIOR THORAX: Mild dependent type atelectasis, no new mass or pleural   effusion. LIVER/BILIARY: No suspicious liver lesion. No biliary dilation. Prior   cholecystectomy. SPLEEN: Normal.       PANCREAS: Normal.       ADRENALS: Normal.       KIDNEYS: Several small left-sided parapelvic cysts. LYMPH NODES: No technically enlarged nodes. GI TRACT: Normal appendix. Previous bowel surgery with anastomosis small bowel   anterior mid pelvis. Several new mildly dilated loops of jejunum in the left   upper abdomen with no evidence of obstructing lesion or small bowel wall   thickening with nonspecific air-fluid levels. Numerous colonic diverticuli. Decompressed descending and sigmoid colon, questionable mild diffuse distal   colonic wall thickening which may be exaggerated by nondistention. No ascites or   free air. Stable strandy soft tissue changes within the anterior abdominal wall   musculature suggesting combination of postop changes atrophy and fibrosis. VASCULATURE: Unremarkable. PELVIC ORGANS: Prior hysterectomy with stable 4 cm thin-walled left adnexal   cyst. Bladder unremarkable.        OTHER: No aggressive appearing osseous lesion.       ============               CXR Results  (Last 48 hours)    None            Medical Decision Making   I am the first provider for this patient. I reviewed the vital signs, available nursing notes, past medical history, past surgical history, family history and social history. Vital Signs-Reviewed the patient's vital signs. Pulse Oximetry Analysis - 100% on RA    Records Reviewed: Nursing Notes, Old Medical Records, Previous Radiology Studies and Previous Laboratory Studies    Procedures:  Procedures    Provider Notes (Medical Decision Making):     Pt presents ambulatory in NAD, well-hydrated, non-toxic in appearance, with mildly elevated HR and otherwise normal vitals. Appears mildly uncomfortable with examination of abdomen. Minimal right flank and right lateral abdomen TTP and no peritoneal signs. Glucose elevated, alk phos mildly elevated, otherwise labs WNL. 4:12 PM : Pt care transferred to Jeffrey Curiel PA-C,ED provider. History of patient complaint(s), available diagnostic reports and current treatment plan has been discussed thoroughly. Bedside rounding on patient occured : no . Intended disposition of patient : Home  Pending diagnostics reports and/or labs (please list): re-assessed abdomen, CT of abdomen. Shaji Mccullough PA-C     4:12 PM :Pt care assumed from Carol Solares , ED provider. Pt complaint(s), current treatment plan, progression and available diagnostic results have been discussed thoroughly. Rounding occurred: no  Intended Disposition: Home   Pending diagnostic reports and/or labs (please list): CT abd pelv  Shaji Mccullough PA-C     Pt CT abd pelv with findings of partial SBO vs localized ileus. Pt last seen by Dr. Lyudmila Weems. D/w ED attending Dr. Anna Winslow, recommends consulting surgery. Consult:    5:07 PM   Discussed care with Dr. Damir Barrientos, gen surg. Standard discussion; including history of patients chief complaint, available diagnostic results, and treatment course. PLAN: He will come to ED and evaluate pt. Dr. Damir Barrientos in ED to evaluate patient. Plan: He will admit pt to surgical floor.  He will place the orders, would like another bolus IVF. Rahel Tavarez PA-C 6:21 PM     MED RECONCILIATION:  No current facility-administered medications for this encounter. Current Outpatient Prescriptions   Medication Sig    traMADol (ULTRAM) 50 mg tablet Take 1 Tab by mouth every six (6) hours as needed for Pain. Max Daily Amount: 200 mg.    metFORMIN (GLUMETZA ER) 500 mg TG24 24 hour tablet take 1 tablet by mouth twice a day    magnesium citrate solution Take 1/3 of bottle every 8 hours until finished or until you have a bowel movement.  ondansetron (ZOFRAN ODT) 4 mg disintegrating tablet Take 1 Tab by mouth every eight (8) hours as needed for Nausea.  bisoprolol-hydroCHLOROthiazide (ZIAC) 2.5-6.25 mg per tablet Take 1 Tab by mouth daily.  polyethylene glycol (MIRALAX) 17 gram/dose powder Take 17 g by mouth two (2) times a day. 1 capful with 8 oz of water daily    senna (SENOKOT) 8.6 mg tablet Take 1 Tab by mouth daily.  amLODIPine (NORVASC) 2.5 mg tablet take 1 tablet by mouth NIGHTLY    ALLERGY RELIEF, CETIRIZINE, 10 mg tablet take 1 tablet by mouth once daily    doxepin (SINEQUAN) 10 mg capsule Take 1 Cap by mouth nightly.  dapagliflozin (FARXIGA) 10 mg tab tablet Take 1 Tab by mouth daily. Indications: type 2 diabetes mellitus    omeprazole (PRILOSEC) 10 mg capsule Take 1 Cap by mouth daily.  albuterol (PROVENTIL HFA, VENTOLIN HFA, PROAIR HFA) 90 mcg/actuation inhaler Take 1 Puff by inhalation every six (6) hours as needed for Wheezing.  atorvastatin (LIPITOR) 40 mg tablet Take 1 Tab by mouth nightly.  glucose blood VI test strips (ONETOUCH ULTRA TEST) strip Check blood glucose as directed       Disposition: Admit        Follow-up Information     None          Current Discharge Medication List              Diagnosis     Clinical Impression:   1. Partial small bowel obstruction ([address]    2. Nausea vomiting and diarrhea    3.  Abdominal pain, generalized

## 2018-08-24 ENCOUNTER — APPOINTMENT (OUTPATIENT)
Dept: GENERAL RADIOLOGY | Age: 58
DRG: 390 | End: 2018-08-24
Attending: COLON & RECTAL SURGERY
Payer: COMMERCIAL

## 2018-08-24 LAB
GLUCOSE BLD STRIP.AUTO-MCNC: 119 MG/DL (ref 70–110)
GLUCOSE BLD STRIP.AUTO-MCNC: 152 MG/DL (ref 70–110)

## 2018-08-24 PROCEDURE — 82962 GLUCOSE BLOOD TEST: CPT

## 2018-08-24 PROCEDURE — 74011250636 HC RX REV CODE- 250/636: Performed by: COLON & RECTAL SURGERY

## 2018-08-24 PROCEDURE — 74011250636 HC RX REV CODE- 250/636: Performed by: NURSE PRACTITIONER

## 2018-08-24 PROCEDURE — 65270000029 HC RM PRIVATE

## 2018-08-24 PROCEDURE — 74011250636 HC RX REV CODE- 250/636

## 2018-08-24 PROCEDURE — 74011250637 HC RX REV CODE- 250/637: Performed by: COLON & RECTAL SURGERY

## 2018-08-24 PROCEDURE — 74011636637 HC RX REV CODE- 636/637: Performed by: COLON & RECTAL SURGERY

## 2018-08-24 PROCEDURE — 74022 RADEX COMPL AQT ABD SERIES: CPT

## 2018-08-24 RX ORDER — MORPHINE SULFATE 4 MG/ML
4 INJECTION INTRAVENOUS
Status: DISCONTINUED | OUTPATIENT
Start: 2018-08-24 | End: 2018-08-25 | Stop reason: HOSPADM

## 2018-08-24 RX ORDER — MAGNESIUM SULFATE 100 %
4 CRYSTALS MISCELLANEOUS AS NEEDED
Status: DISCONTINUED | OUTPATIENT
Start: 2018-08-24 | End: 2018-08-25 | Stop reason: HOSPADM

## 2018-08-24 RX ORDER — DEXTROSE 50 % IN WATER (D50W) INTRAVENOUS SYRINGE
25-50 AS NEEDED
Status: DISCONTINUED | OUTPATIENT
Start: 2018-08-24 | End: 2018-08-25 | Stop reason: HOSPADM

## 2018-08-24 RX ORDER — INSULIN LISPRO 100 [IU]/ML
INJECTION, SOLUTION INTRAVENOUS; SUBCUTANEOUS EVERY 6 HOURS
Status: DISCONTINUED | OUTPATIENT
Start: 2018-08-24 | End: 2018-08-25 | Stop reason: HOSPADM

## 2018-08-24 RX ORDER — DIPHENHYDRAMINE HYDROCHLORIDE 50 MG/ML
10 INJECTION, SOLUTION INTRAMUSCULAR; INTRAVENOUS
Status: DISCONTINUED | OUTPATIENT
Start: 2018-08-24 | End: 2018-08-25 | Stop reason: HOSPADM

## 2018-08-24 RX ORDER — DIPHENHYDRAMINE HYDROCHLORIDE 50 MG/ML
INJECTION, SOLUTION INTRAMUSCULAR; INTRAVENOUS
Status: COMPLETED
Start: 2018-08-24 | End: 2018-08-24

## 2018-08-24 RX ADMIN — INSULIN LISPRO 2 UNITS: 100 INJECTION, SOLUTION INTRAVENOUS; SUBCUTANEOUS at 13:44

## 2018-08-24 RX ADMIN — DIPHENHYDRAMINE HYDROCHLORIDE 10 MG: 50 INJECTION, SOLUTION INTRAMUSCULAR; INTRAVENOUS at 01:29

## 2018-08-24 RX ADMIN — MORPHINE SULFATE 2 MG: 4 INJECTION INTRAVENOUS at 11:40

## 2018-08-24 RX ADMIN — AMLODIPINE BESYLATE 2.5 MG: 2.5 TABLET ORAL at 20:35

## 2018-08-24 RX ADMIN — Medication 10 ML: at 20:35

## 2018-08-24 RX ADMIN — DIPHENHYDRAMINE HYDROCHLORIDE 10 MG: 50 INJECTION, SOLUTION INTRAMUSCULAR; INTRAVENOUS at 21:13

## 2018-08-24 RX ADMIN — BISOPROLOL FUMARATE AND HYDROCHLOROTHIAZIDE 1 TABLET: 6.25; 2.5 TABLET ORAL at 08:53

## 2018-08-24 RX ADMIN — ONDANSETRON 4 MG: 2 INJECTION, SOLUTION INTRAMUSCULAR; INTRAVENOUS at 00:48

## 2018-08-24 RX ADMIN — Medication 10 ML: at 08:59

## 2018-08-24 RX ADMIN — DIPHENHYDRAMINE HYDROCHLORIDE 10 MG: 50 INJECTION, SOLUTION INTRAMUSCULAR; INTRAVENOUS at 07:30

## 2018-08-24 RX ADMIN — Medication 10 ML: at 21:14

## 2018-08-24 RX ADMIN — MORPHINE SULFATE 4 MG: 4 INJECTION INTRAVENOUS at 14:43

## 2018-08-24 RX ADMIN — BISOPROLOL FUMARATE 7.5 MG: 5 TABLET, COATED ORAL at 08:53

## 2018-08-24 RX ADMIN — DIPHENHYDRAMINE HYDROCHLORIDE 10 MG: 50 INJECTION, SOLUTION INTRAMUSCULAR; INTRAVENOUS at 15:51

## 2018-08-24 RX ADMIN — MORPHINE SULFATE 2 MG: 4 INJECTION INTRAVENOUS at 04:20

## 2018-08-24 RX ADMIN — Medication 10 ML: at 13:44

## 2018-08-24 RX ADMIN — ONDANSETRON 4 MG: 2 INJECTION, SOLUTION INTRAMUSCULAR; INTRAVENOUS at 21:13

## 2018-08-24 RX ADMIN — DEXTROSE MONOHYDRATE, SODIUM CHLORIDE, AND POTASSIUM CHLORIDE 100 ML/HR: 50; 4.5; 1.49 INJECTION, SOLUTION INTRAVENOUS at 15:54

## 2018-08-24 RX ADMIN — MORPHINE SULFATE 2 MG: 4 INJECTION INTRAVENOUS at 08:53

## 2018-08-24 RX ADMIN — DEXTROSE MONOHYDRATE, SODIUM CHLORIDE, AND POTASSIUM CHLORIDE 100 ML/HR: 50; 4.5; 1.49 INJECTION, SOLUTION INTRAVENOUS at 04:51

## 2018-08-24 RX ADMIN — MORPHINE SULFATE 4 MG: 4 INJECTION INTRAVENOUS at 21:13

## 2018-08-24 RX ADMIN — MORPHINE SULFATE 4 MG: 4 INJECTION INTRAVENOUS at 18:01

## 2018-08-24 RX ADMIN — MORPHINE SULFATE 2 MG: 4 INJECTION INTRAVENOUS at 00:44

## 2018-08-24 NOTE — PROGRESS NOTES
2115:  Patient in bed, watching television. No signs of distress. Complaints of pain 6/10; tolerable. Patient requesting benadryl for itching. 2120: On call surgeon paged for benadryl    : On call surgeon paged for IV benadryl. Patient states the PO benadryl has not worked. Bedside shift change report given to Janusz Krishnamurthy RN (oncoming nurse) by Mitchell Sharma RN (offgoing nurse). Report included the following information SBAR, ED Summary and MAR.

## 2018-08-24 NOTE — PROGRESS NOTES
Despite multiple attempts to conduct an initial consultation and spiritual assessment for Mayank Rodriguez, who is a 62 y. o.female, patient continues to be unavailable. Patients primary language is: [place]. According to the patients EMR, her Sh[employer] affiliation is: [facility].     The  provided the following interventions:  Offered silent prayer on patient's behalf. Reviewed chart. Plan:  Chaplains will continue our attempts to connect with patient and then provide pastoral care on an as-needed/requested basis.     [facility] Cert[facility] [address] [place] [address],[address] [place] [address]    [phone]

## 2018-08-24 NOTE — PROGRESS NOTES
Patient continues to C/O RLQ abdominal pain with unknown cause, ?chronic pain or spasms  Increased Morphine to 4mg IV Q3  Abdominal Xray :   IMPRESSION  IMPRESSION:     1. Chest:  No acute process.     2.  Abdomen: Nonobstructive nonspecific bowel gas pattern.

## 2018-08-24 NOTE — PROGRESS NOTES
Reason for Admission:   Bowel obstruction                  RRAT Score:     13             Do you (patient/family) have any concerns for transition/discharge? no              Plan for utilizing home health:     no    Likelihood of readmission?   low            Transition of Care Plan:    Spoke with pt, lives with spouse. Designates him for dcp. Independent with adls , amb with a cane. Presently going to op therapy for physical therapy . Will need new script for op therapy at dc to con't. Just was recently dc'd from  services with jfs. pcp dr olivas, last seen 1 mo ago. Plan home. Patient has designated ____spouse____________________ to participate in his/her discharge plan and to receive any needed information. Name: Zacarias Brand  Address:  Phone number: [phone]    Care Management Interventions  PCP Verified by CM: Yes  Palliative Care Criteria Met (RRAT>21 & CHF Dx)?: No  Mode of Transport at Discharge:  Other (see comment)  Transition of Care Consult (CM Consult): Discharge Planning  Discharge Durable Medical Equipment: No  Physical Therapy Consult: No  Occupational Therapy Consult: No  Speech Therapy Consult: No  Current Support Network: Lives with Spouse  Confirm Follow Up Transport: Family  Plan discussed with Pt/Family/Caregiver: Yes  Discharge Location  Discharge Placement: Home

## 2018-08-24 NOTE — PROGRESS NOTES
[address] [facility]rina Ed[facility]son [facility]  [address] [facility] [address] [address]  [phone]  Colon and Rectal Surgery Progress Note      Patient: Severa Clubs MRN: [id number]  SSN: [id number]    YOB: 1960  Age: 62 y.o. Sex: female      Admit Date: 8/23/2018    LOS: 1 day     Subjective:   Appear uncomfortable due to right lower quadrant abdominal discomfort   Also report mild nausea, no episodes of vomiting  +BM with flatus  NPO    Objective:     Vitals:    08/23/18 1930 08/23/18 2143 08/24/18 0121 08/24/18 0803   BP: 133/72 151/87 115/76 118/74   Pulse: 87 82 71 67   Resp:  16 16 17   Temp: 98.3 °F (36.8 °C) 98.1 °F (36.7 °C) 97.6 °F (36.4 °C) 97.8 °F (36.6 °C)   SpO2: 100% 99% 98% 98%        Intake and Output:  Current Shift:    Last three shifts: 08/22 1901 - 08/24 0700  In: -   Out: 400 [Urine:400]    Physical Exam:   GENERAL: alert, cooperative, no distress, appears stated age  LUNG: clear to auscultation bilaterally  HEART: regular rate and rhythm, S1, S2 normal, no murmur, click, rub or gallop  ABDOMEN: distended, tenderness RLQ and RUQ region. Bowel sounds active. No masses,  no organomegaly    Lab/Data Review: All lab results for the last 24 hours reviewed.      Await abdominal xray results    Assessment:     Active Problems:    Bowel obstruction ([address] (8/23/2018)        Plan:   Await abdominal imaging  Continue NPO  Encouraged to ambulate   Pain control    Signed By: Sola Corley NP        August 24, 2018

## 2018-08-25 VITALS
RESPIRATION RATE: 16 BRPM | DIASTOLIC BLOOD PRESSURE: 69 MMHG | TEMPERATURE: 98.9 F | OXYGEN SATURATION: 99 % | HEART RATE: 68 BPM | SYSTOLIC BLOOD PRESSURE: 119 MMHG

## 2018-08-25 LAB
GLUCOSE BLD STRIP.AUTO-MCNC: 143 MG/DL (ref 70–110)
GLUCOSE BLD STRIP.AUTO-MCNC: 147 MG/DL (ref 70–110)

## 2018-08-25 PROCEDURE — 82962 GLUCOSE BLOOD TEST: CPT

## 2018-08-25 PROCEDURE — 74011250636 HC RX REV CODE- 250/636: Performed by: NURSE PRACTITIONER

## 2018-08-25 PROCEDURE — 74011250637 HC RX REV CODE- 250/637: Performed by: COLON & RECTAL SURGERY

## 2018-08-25 PROCEDURE — 74011250636 HC RX REV CODE- 250/636: Performed by: COLON & RECTAL SURGERY

## 2018-08-25 RX ADMIN — DEXTROSE MONOHYDRATE, SODIUM CHLORIDE, AND POTASSIUM CHLORIDE 100 ML/HR: 50; 4.5; 1.49 INJECTION, SOLUTION INTRAVENOUS at 00:45

## 2018-08-25 RX ADMIN — ONDANSETRON 4 MG: 2 INJECTION, SOLUTION INTRAMUSCULAR; INTRAVENOUS at 03:18

## 2018-08-25 RX ADMIN — Medication 10 ML: at 06:30

## 2018-08-25 RX ADMIN — BISOPROLOL FUMARATE AND HYDROCHLOROTHIAZIDE 1 TABLET: 6.25; 2.5 TABLET ORAL at 09:24

## 2018-08-25 RX ADMIN — DIPHENHYDRAMINE HYDROCHLORIDE 10 MG: 50 INJECTION, SOLUTION INTRAMUSCULAR; INTRAVENOUS at 03:18

## 2018-08-25 RX ADMIN — BISOPROLOL FUMARATE 7.5 MG: 5 TABLET, COATED ORAL at 09:24

## 2018-08-25 RX ADMIN — MORPHINE SULFATE 4 MG: 4 INJECTION INTRAVENOUS at 03:18

## 2018-08-25 RX ADMIN — MORPHINE SULFATE 4 MG: 4 INJECTION INTRAVENOUS at 00:39

## 2018-08-25 RX ADMIN — DIPHENHYDRAMINE HYDROCHLORIDE 10 MG: 50 INJECTION, SOLUTION INTRAMUSCULAR; INTRAVENOUS at 09:25

## 2018-08-25 RX ADMIN — MORPHINE SULFATE 4 MG: 4 INJECTION INTRAVENOUS at 06:30

## 2018-08-25 NOTE — PROGRESS NOTES
Patient seen and examined. She has been feeling well since yesterday morning. Abdomen is soft and non-tender. Tolerating diet. Plan:  Discharge home.

## 2018-08-25 NOTE — PROGRESS NOTES
Problem: Falls - Risk of  Goal: *Absence of Falls  Document Kavya Fall Risk and appropriate interventions in the flowsheet.    Outcome: Progressing Towards Goal  Fall Risk Interventions:  Mobility Interventions: Patient to call before getting OOB         Medication Interventions: Patient to call before getting OOB, Teach patient to arise slowly    Elimination Interventions: Call light in reach, Patient to call for help with toileting needs

## 2018-08-25 NOTE — DISCHARGE SUMMARY
General Surgery Discharge Note    Admission Date: 8/23/2018    Discharge Date: 8/25/2018    Admission Diagnosis:  Abdominal pain. Discharge Diagnosis:  Abdominal pain. Procedures Performed:   None. Hospital Course:  Patient was admitted on 8/23/2018 for abdominal pain. Patient admitted to the floor and was placed NPO. Diet sequentially advanced beginning POD 1, pain medications transitioned to oral during the hospital course. At the time of discharge the patient is afebrile, vital signs stable,  tolerating a diet, voiding spontaneously, ambulatory with adequate pain control with oral medications and clear surgical sites without evidence of infection. Condition on Discharge:  Stable. Follow-up care : Follow-up  in the clinic ([address] [place] [address] instructions provided) if needed. Disposition:  Home.      Discharge Medications:      Current Facility-Administered Medications   Medication Dose Route Frequency    diphenhydrAMINE (BENADRYL) injection 10 mg  10 mg IntraVENous Q6H PRN    insulin lispro (HUMALOG) injection   SubCUTAneous Q6H    glucose chewable tablet 16 g  4 Tab Oral PRN    glucagon (GLUCAGEN) injection 1 mg  1 mg IntraMUSCular PRN    dextrose (D50W) injection syrg 12.5-25 g  25-50 mL IntraVENous PRN    morphine injection 4 mg  4 mg IntraVENous Q3H PRN    sodium chloride (NS) flush 5-10 mL  5-10 mL IntraVENous Q8H    sodium chloride (NS) flush 5-10 mL  5-10 mL IntraVENous PRN    ondansetron (ZOFRAN) injection 4 mg  4 mg IntraVENous Q4H PRN    naloxone (NARCAN) injection 0.4 mg  0.4 mg IntraVENous PRN    dextrose 5% - 0.45% NaCl with KCl 20 mEq/L infusion  100 mL/hr IntraVENous CONTINUOUS    bisoprolol-hydroCHLOROthiazide (ZIAC) 2.5-6.25 mg per tablet 1 Tab  1 Tab Oral DAILY    amLODIPine (NORVASC) tablet 2.5 mg  2.5 mg Oral DAILY    bisoprolol (ZEBETA) tablet 7.5 mg  7.5 mg Oral DAILY       Local wound care with daily showers, keep wounds clean and dry    Activity: as desired, no lifting greater than 15lbs or situps for 30 days    Special Instructions:   No driving until activity is not influenced by incisional pain and off narcotics   No bath or hot tub until wounds are healed   Notify [facility] for a fever>101, redness or foul-smelling  drainage from incision, or worsening pain. Followup with surgeon in 10-14 days.

## 2018-08-25 NOTE — PROGRESS NOTES
Bedside and Verbal shift change report received from (off going nurse) Capri Merida, KAMI to (oncoming nurse) Scottie Talley, [facility] . Report included the following information SBAR and Kardex. Patient resting in bed watching television. Patient in very pleasant mood anticipating discharge today with family. No complaint of pain, so s/sx of distress noted. 1123:  Patient discharge to home with mother at side. Patient IV discontinued to [address] [place] [address]. Patient verbalized understanding of discharge instructions with home medications. Patient verbalized understanding of follow up appointment. Patient mother  Driving her home. Patient taken to front lobby by wheelchair.

## 2018-08-25 NOTE — DISCHARGE INSTRUCTIONS
Instructions    Patient: Ev Rowley MRN: [id number]  SSN: [id number]    YOB: 1960  Age: 62 y.o. Sex: female      Activity  · As tolerated, walking encourage, stairs are okay  · Avoid strenuous activities - no lifting anything heavier than 15 pounds. · You may shower at home     Diet  · Regular diet    Call your doctor if  · Excessive bleeding that does not stop after holding mild pressure over the area  · Temperature of 101 degrees F or above  · Redness,excessive swelling or bruising, and/or green or yellow, smelly discharge from incision  · If nausea and vomiting continues    Follow-Up Phone Calls  · Call the office at [phone] to make your follow-up appointment    Appointment date/time: Only if needed.

## 2018-08-25 NOTE — PROGRESS NOTES
1922:  Report received from [facility]. Patient in bed, AOx4. Reports pain, not time for morphine yet. Reporting mild nausea, denies vomiting. Safety measures in place. Will continue to monitor. 9758:  Patient reports having soft, small BM. Flushed, unwitnessed.

## 2018-09-04 ENCOUNTER — APPOINTMENT (OUTPATIENT)
Dept: CT IMAGING | Age: 58
End: 2018-09-04
Attending: EMERGENCY MEDICINE
Payer: COMMERCIAL

## 2018-09-04 ENCOUNTER — HOSPITAL ENCOUNTER (EMERGENCY)
Age: 58
Discharge: HOME | End: 2018-09-04
Attending: EMERGENCY MEDICINE
Payer: COMMERCIAL

## 2018-09-04 VITALS
SYSTOLIC BLOOD PRESSURE: 153 MMHG | OXYGEN SATURATION: 100 % | DIASTOLIC BLOOD PRESSURE: 93 MMHG | HEART RATE: 116 BPM | TEMPERATURE: 98.4 F | RESPIRATION RATE: 16 BRPM

## 2018-09-04 DIAGNOSIS — R10.84 DIFFUSE ABDOMINAL PAIN: Primary | ICD-10-CM

## 2018-09-04 DIAGNOSIS — R11.2 NAUSEA AND VOMITING, INTRACTABILITY OF VOMITING NOT SPECIFIED, UNSPECIFIED VOMITING TYPE: ICD-10-CM

## 2018-09-04 LAB
ALBUMIN SERPL-MCNC: 3.8 G/DL (ref 3.4–5)
ALBUMIN/GLOB SERPL: 0.8 {RATIO} (ref 0.8–1.7)
ALP SERPL-CCNC: 177 U/L (ref 45–117)
ALT SERPL-CCNC: 30 U/L (ref 13–56)
ANION GAP SERPL CALC-SCNC: 10 MMOL/L (ref 3–18)
APPEARANCE UR: CLEAR
AST SERPL-CCNC: 25 U/L (ref 15–37)
BASOPHILS # BLD: 0 K/UL (ref 0–0.1)
BASOPHILS NFR BLD: 0 % (ref 0–2)
BILIRUB SERPL-MCNC: 0.2 MG/DL (ref 0.2–1)
BILIRUB UR QL: NEGATIVE
BUN SERPL-MCNC: 23 MG/DL (ref 7–18)
BUN/CREAT SERPL: 20 (ref 12–20)
CALCIUM SERPL-MCNC: 10.1 MG/DL (ref 8.5–10.1)
CHLORIDE SERPL-SCNC: 102 MMOL/L (ref 100–108)
CO2 SERPL-SCNC: 28 MMOL/L (ref 21–32)
COLOR UR: YELLOW
CREAT SERPL-MCNC: 1.16 MG/DL (ref 0.6–1.3)
DIFFERENTIAL METHOD BLD: ABNORMAL
EOSINOPHIL # BLD: 0 K/UL (ref 0–0.4)
EOSINOPHIL NFR BLD: 0 % (ref 0–5)
ERYTHROCYTE [DISTWIDTH] IN BLOOD BY AUTOMATED COUNT: 14.5 % (ref 11.6–14.5)
GLOBULIN SER CALC-MCNC: 4.8 G/DL (ref 2–4)
GLUCOSE SERPL-MCNC: 263 MG/DL (ref 74–99)
GLUCOSE UR STRIP.AUTO-MCNC: >1000 MG/DL
HCT VFR BLD AUTO: 45 % (ref 35–45)
HGB BLD-MCNC: 14.8 G/DL (ref 12–16)
HGB UR QL STRIP: NEGATIVE
KETONES UR QL STRIP.AUTO: NEGATIVE MG/DL
LEUKOCYTE ESTERASE UR QL STRIP.AUTO: NEGATIVE
LIPASE SERPL-CCNC: 252 U/L (ref 73–393)
LYMPHOCYTES # BLD: 1.4 K/UL (ref 0.9–3.6)
LYMPHOCYTES NFR BLD: 15 % (ref 21–52)
MCH RBC QN AUTO: 28.4 PG (ref 24–34)
MCHC RBC AUTO-ENTMCNC: 32.9 G/DL (ref 31–37)
MCV RBC AUTO: 86.2 FL (ref 74–97)
MONOCYTES # BLD: 0.6 K/UL (ref 0.05–1.2)
MONOCYTES NFR BLD: 6 % (ref 3–10)
NEUTS SEG # BLD: 7.4 K/UL (ref 1.8–8)
NEUTS SEG NFR BLD: 79 % (ref 40–73)
NITRITE UR QL STRIP.AUTO: NEGATIVE
PH UR STRIP: 6 [PH] (ref 5–8)
PLATELET # BLD AUTO: 326 K/UL (ref 135–420)
PMV BLD AUTO: 10.8 FL (ref 9.2–11.8)
POTASSIUM SERPL-SCNC: 4.4 MMOL/L (ref 3.5–5.5)
PROT SERPL-MCNC: 8.6 G/DL (ref 6.4–8.2)
PROT UR STRIP-MCNC: NEGATIVE MG/DL
RBC # BLD AUTO: 5.22 M/UL (ref 4.2–5.3)
SODIUM SERPL-SCNC: 140 MMOL/L (ref 136–145)
SP GR UR REFRACTOMETRY: 1.03 (ref 1–1.03)
UROBILINOGEN UR QL STRIP.AUTO: 0.2 EU/DL (ref 0.2–1)
WBC # BLD AUTO: 9.4 K/UL (ref 4.6–13.2)

## 2018-09-04 PROCEDURE — 96374 THER/PROPH/DIAG INJ IV PUSH: CPT

## 2018-09-04 PROCEDURE — 99284 EMERGENCY DEPT VISIT MOD MDM: CPT

## 2018-09-04 PROCEDURE — 74011250636 HC RX REV CODE- 250/636: Performed by: EMERGENCY MEDICINE

## 2018-09-04 PROCEDURE — 81003 URINALYSIS AUTO W/O SCOPE: CPT

## 2018-09-04 PROCEDURE — 96361 HYDRATE IV INFUSION ADD-ON: CPT

## 2018-09-04 PROCEDURE — 74177 CT ABD & PELVIS W/CONTRAST: CPT

## 2018-09-04 PROCEDURE — 83690 ASSAY OF LIPASE: CPT

## 2018-09-04 PROCEDURE — 74011636320 HC RX REV CODE- 636/320: Performed by: EMERGENCY MEDICINE

## 2018-09-04 PROCEDURE — 80053 COMPREHEN METABOLIC PANEL: CPT

## 2018-09-04 PROCEDURE — 85025 COMPLETE CBC W/AUTO DIFF WBC: CPT

## 2018-09-04 PROCEDURE — 96375 TX/PRO/DX INJ NEW DRUG ADDON: CPT

## 2018-09-04 RX ORDER — FENTANYL CITRATE 50 UG/ML
50 INJECTION, SOLUTION INTRAMUSCULAR; INTRAVENOUS
Status: COMPLETED | OUTPATIENT
Start: 2018-09-04 | End: 2018-09-04

## 2018-09-04 RX ORDER — ONDANSETRON 2 MG/ML
4 INJECTION INTRAMUSCULAR; INTRAVENOUS
Status: COMPLETED | OUTPATIENT
Start: 2018-09-04 | End: 2018-09-04

## 2018-09-04 RX ORDER — KETOROLAC TROMETHAMINE 30 MG/ML
30 INJECTION, SOLUTION INTRAMUSCULAR; INTRAVENOUS
Status: COMPLETED | OUTPATIENT
Start: 2018-09-04 | End: 2018-09-04

## 2018-09-04 RX ADMIN — KETOROLAC TROMETHAMINE 30 MG: 30 INJECTION, SOLUTION INTRAMUSCULAR at 20:02

## 2018-09-04 RX ADMIN — ONDANSETRON 4 MG: 2 INJECTION, SOLUTION INTRAMUSCULAR; INTRAVENOUS at 18:10

## 2018-09-04 RX ADMIN — IOPAMIDOL 90 ML: 612 INJECTION, SOLUTION INTRAVENOUS at 18:47

## 2018-09-04 RX ADMIN — FENTANYL CITRATE 50 MCG: 50 INJECTION, SOLUTION INTRAMUSCULAR; INTRAVENOUS at 18:09

## 2018-09-04 RX ADMIN — SODIUM CHLORIDE 1000 ML: 900 INJECTION, SOLUTION INTRAVENOUS at 18:08

## 2018-09-04 NOTE — ED NOTES
I performed a brief evaluation, including history and physical, of the patient here in triage and I have determined that pt will need further treatment and evaluation from the main side ER physician. I have placed initial orders to help in expediting patients care.      September 04, 2018 at 4:29 PM - Negrita Ravi

## 2018-09-04 NOTE — ED NOTES
7:07 PM :Pt care assumed from Dr. Sumanth Schumacher , ED provider. Pt complaint(s), current treatment plan, progression and available diagnostic results have been discussed thoroughly. Pt with hx of MITZI and distending abdomen c/o abdominal pain. Rounding occurred: yes Intended Disposition: TBD Pending diagnostic reports and/or labs (please list): Pending CT Progress Notes: 
8:00 PM - I have reassessed the patient. Patient is doing ok, some abdominal pain and generalized abdominal tenderness with my exam. Patient was discharged in stable condition. Patient is to return to emergency department if any new or worsening condition. CT interpreted by Sun Estrada Findings:  
 -Prior bowel resection with anastamosis in the anterior left paramidline abdomen. No evidence of obstruction.  
 - Fat stranding in the anterior abdomen and gianni-rectus musculature appears stable to minimal more prominent than on prior, likely related to post-op changes/scarring. 
 - Stable left adnexal cyst 
 - Scattered diverticulosis - Prior cholecystectomy and hysterectomy Disposition: Discharge to home. Scribe Attestation [facility] acting as a scribe for and in the presence of Tulio Mane MD     
September 04, 2018 at 7:07 PM 
    
Provider Attestation:     
I personally performed the services described in the documentation, reviewed the documentation, as recorded by the scribe in my presence, and it accurately and completely records my words and actions.  September 04, 2018 at 7:07 PM - Tulio Mane MD

## 2018-09-04 NOTE — ED TRIAGE NOTES
Pt arrived through triage with c/o abdominal pain and N/V/D. Pt was recently admitted and discharged with SBO.

## 2018-09-05 ENCOUNTER — HOSPITAL ENCOUNTER (OUTPATIENT)
Dept: PHYSICAL THERAPY | Age: 58
Discharge: HOME | End: 2018-09-05
Payer: COMMERCIAL

## 2018-09-05 PROCEDURE — 97110 THERAPEUTIC EXERCISES: CPT

## 2018-09-05 PROCEDURE — 97116 GAIT TRAINING THERAPY: CPT

## 2018-09-05 PROCEDURE — 97016 VASOPNEUMATIC DEVICE THERAPY: CPT

## 2018-09-05 PROCEDURE — 97140 MANUAL THERAPY 1/> REGIONS: CPT

## 2018-09-05 NOTE — PROGRESS NOTES
PT DAILY TREATMENT NOTE  Patient Name: Dragan Prakash Date:2018 : 1960 [x]  Patient  Verified Payor: Kathrine Rosas / Plan: [employer] / Product Type: PPO / In time: 5:00 pm                    Out time: 6:08 pm 
Total Treatment Time (min): 68 Total Timed Codes (min): 58 
1:1 Treatment Time (min): 38 Visit #: 1 of  Treatment Area: Right knee pain [M25.561] Acute postoperative pain of knee [G89.18, M25.569] SUBJECTIVE Pain Level (0-10 scale): 1 Any medication changes, allergies to medications, adverse drug reactions, diagnosis change, or new procedure performed?: [x] No    [] Yes (see summary sheet for update) Subjective functional status/changes:   [] No changes reported \"I haven't been good with my exercises since I have been sick. \" OBJECTIVE Modality rationale: decrease edema, decrease inflammation and decrease pain to improve the patients ability to improve Min Type Additional Details  
 [] Estim: []Att   []Unatt        []TENS instruct []IFC  []Premod   []NMES []Other:  []w/US   []w/ice   []w/heat Position: Location:  
 []  Traction: [] Cervical       []Lumbar 
                     [] Prone          []Supine []Intermittent   []Continuous Lbs: 
[] before manual 
[] after manual  
 []  Ultrasound: []Continuous   [] Pulsed []1MHz   []3MHz Location: 
W/cm2:  
 []  Iontophoresis with dexamethasone Location: [] Take home patch  
[] In clinic  
 []  Ice     []  heat 
[]  Ice massage Position: Location:  
10 [x]  Vasopneumatic Device Pressure:       [] lo [x] med [] hi  
Temperature: [x] lo [] med [] hi  
[x] Skin assessment post-treatment:  [x]intact []redness- no adverse reaction 
     []redness  adverse reaction:  
 
 
38 min Therapeutic Exercise:  [x] See flow sheet: added standing TKE with ball Rationale: increase ROM, increase strength, improve coordination, improve balance and increase proprioception to improve the patients ability to improve gait, mobility 12 min Manual Therapy:  Reassessment; scar massage, grade IV medial and lateral patellar glides, knee PROM with flexion mob in supine, DTM to (R) adductors and pes anserine mm group; manual hip adductor stretch Rationale: decrease pain, increase ROM, increase tissue extensibility and decrease trigger points to improve gait, stairs 8 min Gait Trainin feet without AD req SBA and VCs to inc ankle DF for proper HS, inc knee flexion during midstance Rationale: increase ROM, increase strength, improve coordination, improve balance and increase proprioception to improve the patients ability to improve gait, mobility X min Patient Education: [x] Review HEP Other Objective/Functional Measures:  
Subjective improvement of 80% in symptoms since IE reported. Improvements: amb tolerance, standing tolerance, stair negotiation, sit<>stand transfers, putting on shoes/socks Deficits: AM stiffness, normalized amb without AD, utilizing HR for stair ascent, perceived knee buckling with prolonged amb FOTO score: 53/100 (at last assessment, 33/100) (R) knee A/PROM: 3-110 deg / 0-118 deg, limited by tghtness (R) knee strength: flex 4+/5, ext 3-/5 (R) hip strength: flex 4/5 with quad lag, abd 5/5, ext 4/5 Core strength: 100% bridge Pain: (A) 1-2, (B) 0, (W) 5 
Edema: 1.0 cm difference 3 inches above midpatella Pain Level (0-10 scale) post treatment: 0 
 
ASSESSMENT/Changes in Function:  
See PN.  
 
Patient will continue to benefit from skilled PT services to modify and progress therapeutic interventions, address functional mobility deficits, address ROM deficits, address strength deficits, analyze and address soft tissue restrictions, analyze and cue movement patterns, analyze and modify body mechanics/ergonomics, assess and modify postural abnormalities and address imbalance/dizziness to attain remaining goals. []  See Plan of Care [x]  See progress note/recertification 1/8/00 []  See Discharge Summary Progress towards goals / Updated goals: 1.  Patient will increase FOTO score to 52/100 for indications of increased functional mobility.  -Goal met; 53/100 2.  Patient will demo increased AROM knee flexion to 120 for ease with horton sfers. -Goal progressing; min increase to 110 deg 3.  Patient will demo AROM knee extension to 0 deg for normalized gait. -Goal progressing; 3 deg knee EXT 4.  Patient will demo 4/5 knee extension strength for ease with stair negotiation. -Goal progressing; 3-/5 knee EXT strength PLAN [x]  Upgrade activities as tolerated     [x]  Continue plan of care 
[]  Update interventions per flow sheet      
[]  Discharge due to:_  
[x]  Other: see PN; cont 2x4 for 8 treatments Nas Cook, ELSA  9/5/2018

## 2018-09-05 NOTE — PROGRESS NOTES
[address] [facility] AT [address]. [facility] [address] [facility], [place] [address] Phone: [phone] Fax: [phone] PROGRESS NOTE Patient Name: Carlitos Soliz : 1960 Treatment/Medical Diagnosis: Right knee pain [M25.561] Acute postoperative pain of knee [G89.18, M25.569] Referral Source: Elijah Lopez MD    
Date of Initial Visit: 18 Attended Visits: 10 Missed Visits: 4 SUMMARY OF TREATMENT Pt is a 62y.o. year old female who presents with sp R TKR DOS 18. Treatment has consisted of the following: Therapeutic exercise, Therapeutic activities, Physical agent/modality, Gait/balance training, Manual therapy, Patient education, Self Care training, Functional mobility training, Home safety training and Stair training. CURRENT STATUS Patient has made slow, steady progress evidenced by improved hip/knee strength, reduced ave pain levels, and min increase in knee mobility. Patient had a lapse in treatment sec hospital readmittance for GI issues, but was recently cleared to return to PT. Assessment for continuation as follows: 
Subjective improvement of 80% in symptoms since IE reported. HEP compliance: poor recently Improvements: amb tolerance, standing tolerance, stair negotiation, sit<>stand transfers, putting on shoes/socks Deficits: AM stiffness, normalized amb without AD, utilizing HR for stair ascent, perceived knee buckling with prolonged amb FOTO score: 53/100 (at last assessment, 33/100) (R) knee A/PROM: 3-110 deg / 0-118 deg, limited by tghtness (R) knee strength: flex 4+/5, ext 3-/5 (R) hip strength: flex 4/5 with quad lag, abd 5/5, ext 4/5 Core strength: 100% bridge Pain: (A) 1-2, (B) 0, (W) 5 
Edema: 1.0 cm difference 3 inches above midpatella Goal/Measure of Progress 1.  Patient will increase FOTO score to 52/100 for indications of increased functional mobility.  -Goal met; 53/100 2.  Patient will demo increased AROM knee flexion to 120 for ease with horton sfers. -Goal progressing; min increase to 110 deg 3.  Patient will demo AROM knee extension to 0 deg for normalized gait. -Goal progressing; 3 deg knee EXT 4.  Patient will demo 4/5 knee extension strength for ease with stair negotiation. -Goal progressing; 3-/5 knee EXT strength New/Cont'd Goals to be achieved in __8-12__  treatments: 1. Patient will be (I) with finalized HEP in preparation for D/C. 
2.  Patient will demo increased AROM knee flexion to 120 for ease with horton sfers. 3.  Patient will demo AROM knee extension to 0 deg for normalized gait. 4.  Patient will demo 4/5 knee extension strength for ease with stair negotiation. RECOMMENDATIONS Recommend continue skilled PT at 2-3/4 for 8-12 treatments to address deficits and achieve aforementioned goals. If you have any questions/comments please contact us directly at [phone]. Thank you for allowing us to assist in the care of your patient. LPTA Signature: Jillian Ayala  Date: 9/5/2018 PT Signature: Gael Edmondson DPMIAH Time: 7:44 PM  
NOTE TO PHYSICIAN:  PLEASE COMPLETE THE ORDERS BELOW AND FAX TO [facility]: [phone]. If you are unable to process this request in 24 hours please contact our office: [phone]. 
___ I have read the above report and request that my patient continue as recommended.  
___ I have read the above report and request that my patient continue therapy with the following changes/special instructions:_________________________________________________________  
___ I have read the above report and request that my patient be discharged from therapy.   
 
Physician Signature:        Date:       Time:

## 2018-09-05 NOTE — ED NOTES
I have reviewed discharge instructions with the patient. The patient verbalized understanding. All questions answered. Assisted patient to wheelchair and out of care area

## 2018-09-05 NOTE — DISCHARGE INSTRUCTIONS
Abdominal Pain: Care Instructions  Your Care Instructions    Abdominal pain has many possible causes. Some aren't serious and get better on their own in a few days. Others need more testing and treatment. If your pain continues or gets worse, you need to be rechecked and may need more tests to find out what is wrong. You may need surgery to correct the problem. Don't ignore new symptoms, such as fever, nausea and vomiting, urination problems, pain that gets worse, and dizziness. These may be signs of a more serious problem. Your doctor may have recommended a follow-up visit in the next 8 to 12 hours. If you are not getting better, you may need more tests or treatment. The doctor has checked you carefully, but problems can develop later. If you notice any problems or new symptoms, get medical treatment right away. Follow-up care is a key part of your treatment and safety. Be sure to make and go to all appointments, and call your doctor if you are having problems. It's also a good idea to know your test results and keep a list of the medicines you take. How can you care for yourself at home? · Rest until you feel better. · To prevent dehydration, drink plenty of fluids, enough so that your urine is light yellow or clear like water. Choose water and other caffeine-free clear liquids until you feel better. If you have kidney, heart, or liver disease and have to limit fluids, talk with your doctor before you increase the amount of fluids you drink. · If your stomach is upset, eat mild foods, such as rice, dry toast or crackers, bananas, and applesauce. Try eating several small meals instead of two or three large ones. · Wait until 48 hours after all symptoms have gone away before you have spicy foods, alcohol, and drinks that contain caffeine. · Do not eat foods that are high in fat. · Avoid anti-inflammatory medicines such as aspirin, ibuprofen (Advil, Motrin), and naproxen (Aleve).  These can cause stomach upset. Talk to your doctor if you take daily aspirin for another health problem. When should you call for help? Call 911 anytime you think you may need emergency care. For example, call if:    · You passed out (lost consciousness).     · You pass maroon or very bloody stools.     · You vomit blood or what looks like coffee grounds.     · You have new, severe belly pain.    Call your doctor now or seek immediate medical care if:    · Your pain gets worse, especially if it becomes focused in one area of your belly.     · You have a new or higher fever.     · Your stools are black and look like tar, or they have streaks of blood.     · You have unexpected vaginal bleeding.     · You have symptoms of a urinary tract infection. These may include:  ¨ Pain when you urinate. ¨ Urinating more often than usual.  ¨ Blood in your urine.     · You are dizzy or lightheaded, or you feel like you may faint.    Watch closely for changes in your health, and be sure to contact your doctor if:    · You are not getting better after 1 day (24 hours). Where can you learn more? Go to http://betzyKumodidi.info/. Enter D260 in the search box to learn more about \"Abdominal Pain: Care Instructions. \"  Current as of: November 20, 2017  Content Version: 11.7  © 8859-3332 [employer]. Care instructions adapted under license by [employer] (which disclaims liability or warranty for this information). If you have questions about a medical condition or this instruction, always ask your healthcare professional. [place] [address] any warranty or liability for your use of this information. Nausea and Vomiting: Care Instructions  Your Care Instructions    When you are nauseated, you may feel weak and sweaty and notice a lot of saliva in your mouth. Nausea often leads to vomiting.  Most of the time you do not need to worry about nausea and vomiting, but they can be signs of other illnesses. Two common causes of nausea and vomiting are stomach flu and food poisoning. Nausea and vomiting from viral stomach flu will usually start to improve within 24 hours. Nausea and vomiting from food poisoning may last from 12 to 48 hours. The doctor has checked you carefully, but problems can develop later. If you notice any problems or new symptoms, get medical treatment right away. Follow-up care is a key part of your treatment and safety. Be sure to make and go to all appointments, and call your doctor if you are having problems. It's also a good idea to know your test results and keep a list of the medicines you take. How can you care for yourself at home? · To prevent dehydration, drink plenty of fluids, enough so that your urine is light yellow or clear like water. Choose water and other caffeine-free clear liquids until you feel better. If you have kidney, heart, or liver disease and have to limit fluids, talk with your doctor before you increase the amount of fluids you drink. · Rest in bed until you feel better. · When you are able to eat, try clear soups, mild foods, and liquids until all symptoms are gone for 12 to 48 hours. Other good choices include dry toast, crackers, cooked cereal, and gelatin dessert, such as Jell-O. When should you call for help? Call 911 anytime you think you may need emergency care. For example, call if:    · You passed out (lost consciousness).    Call your doctor now or seek immediate medical care if:    · You have symptoms of dehydration, such as:  ¨ Dry eyes and a dry mouth. ¨ Passing only a little dark urine.   ¨ Feeling thirstier than usual.     · You have new or worsening belly pain.     · You have a new or higher fever.     · You vomit blood or what looks like coffee grounds.    Watch closely for changes in your health, and be sure to contact your doctor if:    · You have ongoing nausea and vomiting.     · Your vomiting is getting worse.     · Your vomiting lasts longer than 2 days.     · You are not getting better as expected. Where can you learn more? Go to http://betzy-didi.info/. Enter 25 008000 in the search box to learn more about \"Nausea and Vomiting: Care Instructions. \"  Current as of: November 20, 2017  Content Version: 11.7  © 4506-5827 [employer]. Care instructions adapted under license by [employer] (which disclaims liability or warranty for this information). If you have questions about a medical condition or this instruction, always ask your healthcare professional. [place] [address] any warranty or liability for your use of this information.

## 2018-09-07 ENCOUNTER — OFFICE VISIT (OUTPATIENT)
Dept: FAMILY MEDICINE CLINIC | Age: 58
End: 2018-09-07

## 2018-09-07 VITALS
WEIGHT: 155.6 LBS | DIASTOLIC BLOOD PRESSURE: 96 MMHG | TEMPERATURE: 98.3 F | OXYGEN SATURATION: 99 % | RESPIRATION RATE: 20 BRPM | HEART RATE: 107 BPM | BODY MASS INDEX: 26.56 KG/M2 | HEIGHT: 64 IN | SYSTOLIC BLOOD PRESSURE: 143 MMHG

## 2018-09-07 DIAGNOSIS — Z23 NEEDS FLU SHOT: Primary | ICD-10-CM

## 2018-09-07 DIAGNOSIS — R22.42 MASS OF LEFT LOWER EXTREMITY: ICD-10-CM

## 2018-09-07 DIAGNOSIS — E11.21 TYPE 2 DIABETES MELLITUS WITH NEPHROPATHY (HCC): ICD-10-CM

## 2018-09-07 DIAGNOSIS — G56.03 BILATERAL CARPAL TUNNEL SYNDROME: ICD-10-CM

## 2018-09-07 DIAGNOSIS — K56.609 SBO (SMALL BOWEL OBSTRUCTION) (HCC): ICD-10-CM

## 2018-09-07 DIAGNOSIS — I10 ESSENTIAL HYPERTENSION: Chronic | ICD-10-CM

## 2018-09-07 DIAGNOSIS — I83.892 VARICOSE VEINS OF LEFT LEG WITH EDEMA: ICD-10-CM

## 2018-09-07 RX ORDER — TRAMADOL HYDROCHLORIDE 50 MG/1
100 TABLET ORAL
Qty: 60 TAB | Refills: 0 | Status: SHIPPED | OUTPATIENT
Start: 2018-09-07 | End: 2018-11-06 | Stop reason: ALTCHOICE

## 2018-09-07 NOTE — PROGRESS NOTES
Room #      SUBJECTIVE:    Christopher Ashley is a 62 y.o. female who presents today for follow up for pain and ED follow up    1. Have you been to the ER, urgent care clinic since your last visit? Hospitalized since your last visit? YES    2. Have you seen or consulted any other health care providers outside of the [address] [place] [address] since your last visit? Include any pap smears or colon screening. YES  When :  Reason:    Health Maintenance reviewed Yes    Health Maintenance Due   Topic Date Due    DTaP/Tdap/Td series (1 - Tdap) 02/04/1981    PAP AKA CERVICAL CYTOLOGY  02/04/1981    EYE EXAM RETINAL OR DILATED Q1  05/29/2018    Influenza Age 9 to Adult  08/01/2018

## 2018-09-07 NOTE — LETTER
NOTIFICATION RETURN TO WORK / SCHOOL 
 
9/7/2018 11:25 AM 
 
Ms. Edwar Azevedo [address] [facility] [phone] To Whom It May Concern: 
 
Edwar Azevedo is currently under the care of [facility]. She will return to work/school on: 09/10/2018 If there are questions or concerns please have the patient contact our office. Sincerely, Michelle Arellano MD

## 2018-09-07 NOTE — MR AVS SNAPSHOT
[facility] 
 
 
 [address] [facility] [address] [place] [address] 
[phone] Patient: Mayank Rodriguez MRN: [id number] ASHWINI:[birth year] Visit Information Date & Time Provider Department Dept. Phone Encounter #  
 9/7/2018 10:30 AM Aron Miguel, [facility] [phone] [id number] Follow-up Instructions Return in about 2 months (around 11/7/2018), or if symptoms worsen or fail to improve, for diabetes. Your Appointments 1/24/2019  2:45 PM  
ESTABLISHED PATIENT with Mitchel Couch MD  
[facility]. [facility] ([address]) Appt Note: return in 1 year [address] [address]  
[phone]  
  
   
 [facility] 41 [address] Upcoming Health Maintenance Date Due DTaP/Tdap/Td series (1 - Tdap) 2/4/1981 PAP AKA CERVICAL CYTOLOGY 2/4/1981 EYE EXAM RETINAL OR DILATED Q1 5/29/2018 Influenza Age 5 to Adult 8/1/2018 HEMOGLOBIN A1C Q6M 2/23/2019 FOOT EXAM Q1 6/12/2019 MICROALBUMIN Q1 6/12/2019 LIPID PANEL Q1 6/12/2019 BREAST CANCER SCRN MAMMOGRAM 11/24/2019 COLONOSCOPY 3/3/2026 Allergies as of 9/7/2018  Review Complete On: 9/7/2018 By: Juan Manuel Brar LPN Severity Noted Reaction Type Reactions Macrodantin [Nitrofurantoin Macrocrystalline]  01/31/2012    Itching, Other (comments) Tape [Adhesive]  09/30/2014    Other (comments) Paper tape-- feels like burning skin Burned skin when had wound vac Current Immunizations  Reviewed on 6/12/2018 Name Date Influenza Vaccine 11/15/2016 Influenza Vaccine (Quad) PF  Incomplete Pneumococcal Polysaccharide (PPSV-23) 6/12/2018 Not reviewed this visit You Were Diagnosed With   
  
 Codes Comments Needs flu shot    -  Primary ICD-10-CM: K81 ICD-9-CM: V04.81   
 SBO [facility]small bowel obstruction) ([facility]     ICD-10-CM: F70.178 ICD-9-CM: 560.9 Type 2 diabetes mellitus with nephropathy (HCC)     ICD-10-CM: E11.21 
ICD-9-CM: 250.40, 583.81 Essential hypertension     ICD-10-CM: I10 
ICD-9-CM: 401.9 Bilateral carpal tunnel syndrome     ICD-10-CM: G56.03 
ICD-9-CM: 354.0 Mass of left lower extremity     ICD-10-CM: R22.42 
ICD-9-CM: 532. 2 Varicose veins of left leg with edema     ICD-10-CM: D05.223 ICD-9-CM: 454.8 Vitals BP Pulse Temp Resp Height(growth percentile) Weight(growth percentile) (!) 143/96 (BP 1 Location: Right arm, BP Patient Position: Sitting) (!) 107 98.3 °F (36.8 °C) (Oral) 20 5' 4\" (1.626 m) 155 lb 9.6 oz (70.6 kg) SpO2 BMI OB Status Smoking Status 99% 26.71 kg/m2 Hysterectomy Never Smoker Vitals History BMI and BSA Data Body Mass Index Body Surface Area  
 26.71 kg/m 2 1.79 m 2 Preferred Pharmacy Pharmacy Name Phone [facility] [address]  [facility] [phone] Your Updated Medication List  
  
   
This list is accurate as of 9/7/18 11:22 AM.  Always use your most recent med list.  
  
  
  
  
 albuterol 90 mcg/actuation inhaler Commonly known as:  PROVENTIL HFA, VENTOLIN HFA, PROAIR HFA Take 1 Puff by inhalation every six (6) hours as needed for Wheezing. ALLERGY RELIEF (CETIRIZINE) 10 mg tablet Generic drug:  cetirizine  
take 1 tablet by mouth once daily  
  
 amLODIPine 2.5 mg tablet Commonly known as:  NORVASC  
take 1 tablet by mouth NIGHTLY  
  
 atorvastatin 40 mg tablet Commonly known as:  LIPITOR Take 1 Tab by mouth nightly. bisoprolol-hydroCHLOROthiazide 2.5-6.25 mg per tablet Commonly known as:  [facility] Take 1 Tab by mouth daily. dapagliflozin 10 mg Tab tablet Commonly known as:  [employer] Take 1 Tab by mouth daily. Indications: type 2 diabetes mellitus  
  
 doxepin 10 mg capsule Commonly known as:  SINEquan Take 1 Cap by mouth nightly. glucose blood VI test strips strip Commonly known as:  ONETOUCH ULTRA TEST Check blood glucose as directed  
  
 magnesium citrate solution Take 1/3 of bottle every 8 hours until finished or until you have a bowel movement. metFORMIN 500 mg Tg24 24 hour tablet Commonly known as:  GLUMETZA ER  
take 1 tablet by mouth twice a day  
  
 omeprazole 10 mg capsule Commonly known as:  PRILOSEC Take 1 Cap by mouth daily. ondansetron 4 mg disintegrating tablet Commonly known as:  ZOFRAN ODT Take 1 Tab by mouth every eight (8) hours as needed for Nausea. polyethylene glycol 17 gram/dose powder Commonly known as:  [employer] Take 17 g by mouth two (2) times a day. 1 capful with 8 oz of water daily  
  
 senna 8.6 mg tablet Commonly known as:  [employer] Take 1 Tab by mouth daily. traMADol 50 mg tablet Commonly known as:  ULTRAM  
Take 2 Tabs by mouth every six (6) hours as needed for Pain. Max Daily Amount: 400 mg. Prescriptions Printed Refills  
 traMADol (ULTRAM) 50 mg tablet 0 Sig: Take 2 Tabs by mouth every six (6) hours as needed for Pain. Max Daily Amount: 400 mg. Class: Print Route: Oral  
  
We Performed the Following INFLUENZA VIRUS VAC QUAD,SPLIT,PRESV FREE SYRINGE IM [id number] CPT(R)] REFERRAL TO VASCULAR SURGERY [IAO922 Custom] Comments:  
 Please evaluate patient for painful varicose veins left popliteal fossa. 1st available is fine. Follow-up Instructions Return in about 2 months (around 11/7/2018), or if symptoms worsen or fail to improve, for diabetes. To-Do List   
 09/11/2018 4:00 PM  
  Appointment with [facility] 1 at [facility] PT [facility] ([phone])  
  
 09/13/2018 5:00 PM  
  Appointment with [facility] 1 at [facility] PT [facility] IM ([phone])  
  
 09/18/2018 4:00 PM  
  Appointment with [facility] 1 at [facility] PT [address] [place] [address] ([phone])  
  
 09/20/2018 4:30 PM  
  Appointment with Joe Nicole, PT at [facility] ([phone]) 09/21/2018 10:00 AM  
  Appointment with [facility] SLEEP RM 2 at [facility] ([phone])  
  
 09/21/2018 10:15 AM  
  Appointment with Grac[facility] [address] 2 at [place] [address] ([phone])  
  
 09/25/2018 4:00 PM  
  Appointment with [address] [facility] 1 at [facility] PT [address] ([phone])  
  
 09/27/2018 4:00 PM  
  Appointment with Jovita Eckert, PT at [facility] PT [address] ([phone]) Referral Information Referral ID Referred By Referred To  
  
 [id number] John DEGROOT MD   
   [address] [facility] [address] [place] [address] Phone: [phone] Fax: [phone] Visits Status Start Date End Date 1 New Request 9/7/18 9/7/19 If your referral has a status of pending review or denied, additional information will be sent to support the outcome of this decision. Introducing [facility]! [employer] introduces Venturepax patient portal. Now you can access parts of your medical record, email your doctor's office, and request medication refills online. 1. In your internet browser, go to [email or web address] 2. Click on the First Time User? Click Here link in the Sign In box. You will see the New Member Sign Up page. 3. Enter your Venturepax Access Code exactly as it appears below. You will not need to use this code after youve completed the sign-up process. If you do not sign up before the expiration date, you must request a new code. · Venturepax Access Code: [email or web address] Expires: 9/10/2018  3:19 PM 
 
4. Enter the last four digits of your Social Security Number (xxxx) and Date of Birth (mm/dd/yyyy) as indicated and click Submit. You will be taken to the next sign-up page. 5. Create a MailMe[employer]t ID. This will be your Venturepax login ID and cannot be changed, so think of one that is secure and easy to remember. 6. Create a [employer] password. You can change your password at any time. 7. Enter your Password Reset Question and Answer. This can be used at a later time if you forget your password. 8. Enter your e-mail address. You will receive e-mail notification when new information is available in [address] 9. Click Sign Up. You can now view and download portions of your medical record. 10. Click the Download Summary menu link to download a portable copy of your medical information. If you have questions, please visit the Frequently Asked Questions section of the [employer] website. Remember, [employer] is NOT to be used for urgent needs. For medical emergencies, dial 911. Now available from your iPhone and Android! Please provide this summary of care documentation to your next provider. Your primary care clinician is listed as Leo Hurd. If you have any questions after today's visit, please call [phone].

## 2018-09-07 NOTE — PROGRESS NOTES
Thom Lange is a 62 y.o.  female and presents with    Chief Complaint   Patient presents with    Abdominal Pain           Subjective:  Mrs. Nori Sanchez presents for f/u after hospitalization for bowel obstruction. She had nasogastric tube for 3 days. She had elevated blood pressure in the hospital.  She she has decreased appetite. She is having a bowel movement daily. Hypertension  Patient is here for follow-up of hypertension. She indicates that she is feeling well and denies any symptoms referable to her hypertension. She is exercising and is adherent to low salt diet. Blood pressure is well controlled at home. Use of agents associated with hypertension: none. Osteoarthritis and Chronic Pain:  Patient has osteoarthritis, primarily affecting the knees. Symptoms onset: problem is longstanding. Rheumatological ROS: increasing significant pain in knees and legs. She c/o increasing size of lumps on her back left leg. Response to treatment plan: she is followed by dr. Jimmie De Paz s/p joint replacement; she is undergoing physical therapy. She has used tramadol with minimal benefit. Additional Concerns: she is concerned regarding control of her blood glucose. Patient Active Problem List   Diagnosis Code    Osteoarthritis of left knee M17.12    Hypertension I10    Hyperlipidemia E78.5    GERD (gastroesophageal reflux disease) K21.9    Asthma J45.909    Type 2 diabetes mellitus ([address] E11.9    SBO (small bowel obstruction) ([facility] K56.609    Chronic abdominal pain R10.9, G89.29    Post-operative pain G89.18    Chest pain R07.9    Essential hypertension, benign I10    Sural neuritis G57.80    Type 2 diabetes mellitus with nephropathy ([facility]) E11.21    Non-intractable cyclical vomiting with nausea G43. A0    Bowel obstruction ([address] K56.609     Patient Active Problem List    Diagnosis Date Noted    Bowel obstruction ([address] 08/23/2018    Non-intractable cyclical vomiting with nausea 01/24/2018    Type 2 diabetes mellitus with nephropathy [facility] 01/19/2018    Chronic abdominal pain 03/14/2017    Post-operative pain 03/14/2017    SBO (small bowel obstruction) ([facility]) 02/21/2017    Osteoarthritis of left knee 06/27/2016    Hypertension 06/27/2016    Hyperlipidemia 06/27/2016    GERD (gastroesophageal reflux disease) 06/27/2016    Asthma 06/27/2016    Type 2 diabetes mellitus ([facility] 06/27/2016    Sural neuritis 06/23/2014    Chest pain 04/20/2014    Essential hypertension, benign 02/27/2012     Current Outpatient Prescriptions   Medication Sig Dispense Refill    traMADol (ULTRAM) 50 mg tablet Take 1 Tab by mouth every six (6) hours as needed for Pain. Max Daily Amount: 200 mg. 12 Tab 0    metFORMIN (GLUMETZA ER) 500 mg TG24 24 hour tablet take 1 tablet by mouth twice a day 60 Tab 2    magnesium citrate solution Take 1/3 of bottle every 8 hours until finished or until you have a bowel movement. 1 Bottle 0    ondansetron (ZOFRAN ODT) 4 mg disintegrating tablet Take 1 Tab by mouth every eight (8) hours as needed for Nausea. 12 Tab 0    bisoprolol-hydroCHLOROthiazide (ZIAC) 2.5-6.25 mg per tablet Take 1 Tab by mouth daily. 90 Tab 3    polyethylene glycol (MIRALAX) 17 gram/dose powder Take 17 g by mouth two (2) times a day. 1 capful with 8 oz of water daily 119 g 0    senna (SENOKOT) 8.6 mg tablet Take 1 Tab by mouth daily. 30 Tab 0    amLODIPine (NORVASC) 2.5 mg tablet take 1 tablet by mouth NIGHTLY 90 Tab 1    ALLERGY RELIEF, CETIRIZINE, 10 mg tablet take 1 tablet by mouth once daily  0    doxepin (SINEQUAN) 10 mg capsule Take 1 Cap by mouth nightly. 30 Cap 11    dapagliflozin (FARXIGA) 10 mg tab tablet Take 1 Tab by mouth daily. Indications: type 2 diabetes mellitus 90 Tab 3    omeprazole (PRILOSEC) 10 mg capsule Take 1 Cap by mouth daily.  30 Cap 11    albuterol (PROVENTIL HFA, VENTOLIN HFA, PROAIR HFA) 90 mcg/actuation inhaler Take 1 Puff by inhalation every six (6) hours as needed for Wheezing. 1 Inhaler 0    atorvastatin (LIPITOR) 40 mg tablet Take 1 Tab by mouth nightly. 90 Tab 3    glucose blood VI test strips (ONETOUCH ULTRA TEST) strip Check blood glucose as directed 100 Strip 1     Allergies   Allergen Reactions    Macrodantin [Nitrofurantoin Macrocrystalline] Itching and Other (comments)    Tape [Adhesive] Other (comments)     Paper tape-- feels like burning skin  Burned skin when had wound vac     Past Medical History:   Diagnosis Date    Abdominal adhesions     Anemia     Arthritis     all over the body    Asthma     Cocaine abuse     Diabetes mellitus     Dyskinesia     bilateral    Essential hypertension     GERD (gastroesophageal reflux disease)     Hypertension     Menopause     Microhematuria     Stool color black      Past Surgical History:   Procedure Laterality Date    HX CHOLECYSTECTOMY  6/28/10    HX COLONOSCOPY      HX ENDOSCOPY      HX HERNIA REPAIR      HX HYSTERECTOMY      Fibroids    HX KNEE REPLACEMENT Left     HX OOPHORECTOMY  12/2000    HX ORTHOPAEDIC Right     ankle- multiple surgeries    HX SMALL BOWEL RESECTION  12/2000    HX SMALL BOWEL RESECTION  02/21/2017    Dr. Willa Wharton       Family History   Problem Relation Age of Onset    Hypertension Other      parent    Breast Cancer Other 21    Heart Disease Father     Hypertension Father     Diabetes Father     Diabetes Mother     Hypertension Other      sibling    Diabetes Other      parent    Diabetes Sister     Kidney Disease Brother     Diabetes Brother      Social History   Substance Use Topics    Smoking status: Never Smoker    Smokeless tobacco: Never Used    Alcohol use No       ROS   Constitutional: Negative for chills and fever. HENT: Negative for ear pain and sore throat. Eyes: Negative for pain and visual disturbance. Respiratory: Negative for cough and shortness of breath.     Cardiovascular: Negative for chest pain and palpitations. Gastrointestinal: Positive for abdominal distention, abdominal pain, diarrhea, nausea and vomiting. Genitourinary: Negative for flank pain. Musculoskeletal: Negative for back pain and neck pain. Neurological: Negative for syncope and headaches. Psychiatric/Behavioral: Negative for agitation. She denies anxiety  All other systems reviewed and are negative. Objective:  Vitals:    09/07/18 1058 09/07/18 1104   BP: (!) 143/96 (!) 143/96   Pulse: (!) 107    Resp: 20    Temp: 98.3 °F (36.8 °C)    TempSrc: Oral    SpO2: 99%    Weight: 155 lb 9.6 oz (70.6 kg)    Height: 5' 4\" (1.626 m)    PainSc:   8    PainLoc: Knee      Constitutional: She is oriented to person, place, and time. She appears well-developed and well-nourished. She appears calm   HENT:   Head: Normocephalic and atraumatic. Mouth/Throat: Oropharynx is clear and moist.   Eyes: Pupils are equal, round, and reactive to light. No scleral icterus. Neck: Neck supple. No tracheal deviation present. Cardiovascular: Regular rhythm. No murmur heard. Pulmonary/Chest: Effort normal and breath sounds normal. No respiratory distress. Abdominal: Soft. She exhibits distension (mild). Tenderness: diffuse  Musculoskeletal: Normal range of motion. She exhibits no deformity. Neurological: She is alert and oriented to person, place, and time. No gross neuro deficit   Skin: left posterior leg with ropy ttp popliteal fossa  Psychiatric: She has a normal mood and affect. LABS   hgb a1c 6.5  TESTS  CT abd/pelvis  IMPRESSION:        1. Postsurgical changes of previous small bowel resection with enteric  anastomosis left anterior para midline abdomen. No findings of obstruction,  dilatation or bowel wall thickening. 2. Scattered diverticulosis without diverticulitis. 3. Normal appendix. 4. Stable left adnexal cyst.  5. Prior cholecystectomy and hysterectomy    Assessment/Plan:    1.  Needs flu shot    - INFLUENZA VIRUS VACCINE QUADRIVALENT, PRESERVATIVE FREE SYRINGE ([id number])    2. SBO (small bowel obstruction) (HCC)  Resolved    3. Type 2 diabetes mellitus with nephropathy (HCC)  Goal hgb a1c <7; controlled with current treatment; encourage compliance with medication for blood pressure control    4. Essential hypertension  Goal <130/80; borderline controlled; encourage low salt diet    5. Bilateral carpal tunnel syndrome  This is a chronic problem that is improved. Per review of available records and patients , there are not sign of overuse, misuse, diversion, or concerning side effects. Today we reviewed: the risk of overdose, addiction, and dependency proper storage and disposal of medications the goals of treatment (improve functionality, quality of life, and pain)  The following changes were made to the patients current treatment plan: medications adjusted, see orders. - traMADol (ULTRAM) 50 mg tablet; Take 2 Tabs by mouth every six (6) hours as needed for Pain. Max Daily Amount: 400 mg. Dispense: 60 Tab; Refill: 0    6. Mass of left lower extremity  Likely varicosity; pain management  - traMADol (ULTRAM) 50 mg tablet; Take 2 Tabs by mouth every six (6) hours as needed for Pain. Max Daily Amount: 400 mg. Dispense: 60 Tab; Refill: 0    7. Varicose veins of left leg with edema  Symptomatic; referral to vascular surgery for evaluation and treatment  - REFERRAL TO VASCULAR SURGERY      Lab review: labs reviewed, I note that glycosylated hemoglobin mildly abnormal but acceptable      I have discussed the diagnosis with the patient and the intended plan as seen in the above orders. The patient has received an after-visit summary and questions were answered concerning future plans. I have discussed medication side effects and warnings with the patient as well. I have reviewed the plan of care with the patient, accepted their input and they are in agreement with the treatment goals.      Follow-up Disposition:  Return in about 2 months (around 11/7/2018), or if symptoms worsen or fail to improve, for diabetes.

## 2018-09-11 ENCOUNTER — HOSPITAL ENCOUNTER (OUTPATIENT)
Dept: PHYSICAL THERAPY | Age: 58
Discharge: HOME | End: 2018-09-11
Payer: COMMERCIAL

## 2018-09-11 PROCEDURE — 97016 VASOPNEUMATIC DEVICE THERAPY: CPT

## 2018-09-11 PROCEDURE — 97110 THERAPEUTIC EXERCISES: CPT

## 2018-09-11 PROCEDURE — 97140 MANUAL THERAPY 1/> REGIONS: CPT

## 2018-09-11 NOTE — PROGRESS NOTES
PT DAILY TREATMENT NOTE  Patient Name: Jonna Pool Date:2018 : 1960 [x]  Patient  Verified Payor: Princess Montalvo / [place] [employer] [place] / Product Type: PPO / In time: 400 pm                    Out time: 455pm 
Total Treatment Time (min): 55 Total Timed Codes (min): 40 
1:1 Treatment Time (min): 40 Visit #: 2 of  Treatment Area: Right knee pain [M25.561] Acute postoperative pain of knee [G89.18, M25.569] SUBJECTIVE Pain Level (0-10 scale): 1 Any medication changes, allergies to medications, adverse drug reactions, diagnosis change, or new procedure performed?: [x] No    [] Yes (see summary sheet for update) Subjective functional status/changes:   [] No changes reported \"I haven't been really doing my exercises at home bc Im tired after work. I do continue to ice my right knee. My right knee and low back are a bit sore today. \" OBJECTIVE Modality rationale: decrease edema, decrease inflammation and decrease pain to improve the patients ability to improve Min Type Additional Details  
 [] Estim: []Att   []Unatt        []TENS instruct []IFC  []Premod   []NMES []Other:  []w/US   []w/ice   []w/heat Position: Location:  
 []  Traction: [] Cervical       []Lumbar 
                     [] Prone          []Supine []Intermittent   []Continuous Lbs: 
[] before manual 
[] after manual  
 []  Ultrasound: []Continuous   [] Pulsed []1MHz   []3MHz Location: 
W/cm2:  
 []  Iontophoresis with dexamethasone Location: [] Take home patch  
[] In clinic  
 []  Ice     []  heat 
[]  Ice massage Position: Location:  
10 [x]  Vasopneumatic Device Pressure:       [] lo [x] med [] hi  
Temperature: [x] lo [] med [] hi  
[x] Skin assessment post-treatment:  [x]intact []redness- no adverse reaction 
     []redness  adverse reaction: 31/26 min Therapeutic Exercise:  [x] See flow sheet: NC for warm up; mirror used for mini squats Rationale: increase ROM, increase strength, improve coordination, improve balance and increase proprioception to improve the patients ability to improve gait, mobility 14 min Manual Therapy:  STM/DTM right rectus femoris and ITB; Grade IV medial and caudal right PF glides to tolerance; right tibiofemoral distraction and mobes into flexion EOB grade IV; right tibiofemoral glides grade IV into flexion in supine Rationale: decrease pain, increase ROM, increase tissue extensibility and decrease trigger points to improve gait, stairs 0 min Gait Training:   
Rationale: increase ROM, increase strength, improve coordination, improve balance and increase proprioception to improve the patients ability to improve gait, mobility X min Patient Education: [x] Review HEP Other Objective/Functional Measures:  
Right knee AROM/PROM at start of session: 3-110/0-118; at end of session: 0-115/0-118 (supine) Pain Level (0-10 scale) post treatment: 2 
 
ASSESSMENT/Changes in Function:  
Pt stated at the beginning of the session that she is not compliant with daily HEP secondary to being back at work. Maximal education on the importance of daily HEP completion with a focus on icing/stretching at the end of such to prevent DOMS. Observable wear and tear on outer aspects of pts sneakers. Pt encouraged to don newer and more supportive footwear at work as she is on concrete/tile floors each day. Pt with improved AROM into flexion after manual therapy which increased from 110 to 115 degrees in supine. Pt with decreased eccentric strength of the right quadriceps and difficulty with step ups this session. No difficulty/pain with concentric strengthening.  
 
Patient will continue to benefit from skilled PT services to modify and progress therapeutic interventions, address functional mobility deficits, address ROM deficits, address strength deficits, analyze and address soft tissue restrictions, analyze and cue movement patterns, analyze and modify body mechanics/ergonomics, assess and modify postural abnormalities and address imbalance/dizziness to attain remaining goals. []  See Plan of Care [x]  See progress note/recertification 8/3/09 []  See Discharge Summary Progress towards goals / Updated goals: 1.  Patient will increase FOTO score to 52/100 for indications of increased functional mobility.  -Goal met; 53/100 2.  Patient will demo increased AROM knee flexion to 120 for ease with horton sfers. -Goal progressing; 0-115 degrees in supine at end of session 9/11/18 3.  Patient will demo AROM knee extension to 0 deg for normalized gait. -Goal progressing; 3 deg knee EXT at start of session and 0 at end of session 9/11/18 4.  Patient will demo 4/5 knee extension strength for ease with stair negotiation. -Goal progressing; 3-/5 knee EXT strength PLAN [x]  Upgrade activities as tolerated     [x]  Continue plan of care 
[]  Update interventions per flow sheet      
[]  Discharge due to:_  
[]  Other:  
 
Munira Hoffman, PT  9/11/2018

## 2018-09-13 ENCOUNTER — HOSPITAL ENCOUNTER (OUTPATIENT)
Dept: PHYSICAL THERAPY | Age: 58
Discharge: HOME | End: 2018-09-13
Payer: COMMERCIAL

## 2018-09-13 PROCEDURE — 97014 ELECTRIC STIMULATION THERAPY: CPT

## 2018-09-13 PROCEDURE — 97016 VASOPNEUMATIC DEVICE THERAPY: CPT

## 2018-09-13 PROCEDURE — 97110 THERAPEUTIC EXERCISES: CPT

## 2018-09-13 PROCEDURE — 97140 MANUAL THERAPY 1/> REGIONS: CPT

## 2018-09-13 NOTE — PROGRESS NOTES
PT DAILY TREATMENT NOTE  Patient Name: Lore Chinchilla Date:2018 : 1960 [x]  Patient  Verified Payor: Melissa Orlando / Plan: [place] [employer] / Product Type: PPO / In time: 124   Out time: 220 Total Treatment Time (min): 56 Total Timed Codes (min): 36 
1:1 Treatment Time (min): 36 Visit #: 3 of  Treatment Area: Right knee pain [M25.561] Acute postoperative pain of knee [G89.18, M25.569] SUBJECTIVE Pain Level (0-10 scale): 0 Any medication changes, allergies to medications, adverse drug reactions, diagnosis change, or new procedure performed?: [x] No    [] Yes (see summary sheet for update) Subjective functional status/changes:   [] No changes reported \"Just soreness. I have been trying to work it and get it right this morning.' OBJECTIVE Modality rationale: decrease edema, decrease inflammation and decrease pain to improve the patients ability to improve Min Type Additional Details 10 (8 min treatment) [x] Estim: []Att   []Unatt        []TENS instruct []IFC  []Premod   [x]NMES [x]Other: 8 min - 5 on 10 off with active QS   []w/US   []w/ice   []w/heat Position: long sitting Location: R quad   
 []  Traction: [] Cervical       []Lumbar 
                     [] Prone          []Supine []Intermittent   []Continuous Lbs: 
[] before manual 
[] after manual  
 []  Ultrasound: []Continuous   [] Pulsed []1MHz   []3MHz Location: 
W/cm2:  
 []  Iontophoresis with dexamethasone Location: [] Take home patch  
[] In clinic  
 []  Ice     []  heat 
[]  Ice massage Position: Location:  
10 [x]  Vasopneumatic Device Pressure:       [] lo [x] med [] hi  
Temperature: [x] lo [] med [] hi  
[x] Skin assessment post-treatment:  [x]intact []redness- no adverse reaction 
     []redness  adverse reaction:  
 
 
26 min Therapeutic Exercise:  [x] See flow sheet: Rationale: increase ROM, increase strength, improve coordination, improve balance and increase proprioception to improve the patients ability to improve gait, mobility 10 min Manual Therapy:  EOB knee distraction, grade 3-4 joint mobs for flexion and extension, joint line deep palpation, supine grade 3-4 joint mobs for flexion and extension, patellar mobs Rationale: decrease pain, increase ROM, increase tissue extensibility and decrease trigger points to improve gait, stairs 0 min Gait Training:   
Rationale: increase ROM, increase strength, improve coordination, improve balance and increase proprioception to improve the patients ability to improve gait, mobility X min Patient Education: [x] Review HEP Other Objective/Functional Measures: AROM 0-113 SLR \" mild quad lag on initiation Intermittent UE assistance with 30 sec SLS Pain Level (0-10 scale) post treatment: 0-1 ASSESSMENT/Changes in Function: Pt with significant joint line tenderness medial and lateral.  Quad fasciculations with deep palpation. NMES initiated sec to quad lag with SLR and reported difficulty with ecc lowering with stair negotiation and intermittent knee collapse. Patient will continue to benefit from skilled PT services to modify and progress therapeutic interventions, address functional mobility deficits, address ROM deficits, address strength deficits, analyze and address soft tissue restrictions, analyze and cue movement patterns, analyze and modify body mechanics/ergonomics, assess and modify postural abnormalities and address imbalance/dizziness to attain remaining goals. []  See Plan of Care [x]  See progress note/recertification 1/7/94 []  See Discharge Summary Progress towards goals / Updated goals: CONT PER UPDATED PN GOALS 9/13/18 New/Cont'd Goals to be achieved in __8-12__  treatments: 1. Patient will be (I) with finalized HEP in preparation for D/C. 2.  Patient will demo increased AROM knee flexion to 120 for ease with horton sfers. 3.  Patient will demo AROM knee extension to 0 deg for normalized gait. 4.  Patient will demo 4/5 knee extension strength for ease with stair negotiation. PLAN [x]  Upgrade activities as tolerated     [x]  Continue plan of care 
[]  Update interventions per flow sheet      
[]  Discharge due to:_  
[x]  Other: assess response to NMES Brett Bales, PT  9/13/2018

## 2018-09-18 ENCOUNTER — HOSPITAL ENCOUNTER (OUTPATIENT)
Dept: PHYSICAL THERAPY | Age: 58
Discharge: HOME | End: 2018-09-18
Payer: COMMERCIAL

## 2018-09-18 PROCEDURE — 97014 ELECTRIC STIMULATION THERAPY: CPT

## 2018-09-18 PROCEDURE — 97016 VASOPNEUMATIC DEVICE THERAPY: CPT

## 2018-09-18 PROCEDURE — 97140 MANUAL THERAPY 1/> REGIONS: CPT

## 2018-09-18 PROCEDURE — 97110 THERAPEUTIC EXERCISES: CPT

## 2018-09-18 NOTE — PROGRESS NOTES
PT DAILY TREATMENT NOTE  Patient Name: Selma Holt Date:2018 : 1960 [x]  Patient  Verified Payor: Opal Almaraz / [place]: [place] [employer] [place] / Product Type: PPO / In time: 358 Out time: 507 Total Treatment Time (min): 69 Total Timed Codes (min): 49 
1:1 Treatment Time (min): 358 - 436 (38) Visit #: 4 of  Treatment Area: Right knee pain [M25.561] Acute postoperative pain of knee [G89.18, M25.569] SUBJECTIVE Pain Level (0-10 scale): 0 Any medication changes, allergies to medications, adverse drug reactions, diagnosis change, or new procedure performed?: [x] No    [] Yes (see summary sheet for update) Subjective functional status/changes:   [] No changes reported \"Just the normal soreness. \" OBJECTIVE Modality rationale: decrease edema, decrease inflammation and decrease pain to improve the patients ability to improve Min Type Additional Details 10 (8 min treatment) [x] Estim: []Att   []Unatt        []TENS instruct []IFC  []Premod   [x]NMES [x]Other: 8 min - 5 on 10 off with ECC SAQ lowering 2# []w/US   []w/ice   []w/heat Position: long sitting Location: R quad   
 []  Traction: [] Cervical       []Lumbar 
                     [] Prone          []Supine []Intermittent   []Continuous Lbs: 
[] before manual 
[] after manual  
 []  Ultrasound: []Continuous   [] Pulsed []1MHz   []3MHz Location: 
W/cm2:  
 []  Iontophoresis with dexamethasone Location: [] Take home patch  
[] In clinic  
 []  Ice     []  heat 
[]  Ice massage Position: Location:  
10 [x]  Vasopneumatic Device Pressure:       [] lo [x] med [] hi  
Temperature: [x] lo [] med [] hi  
[x] Skin assessment post-treatment:  [x]intact []redness- no adverse reaction 
     []redness  adverse reaction:  
 
 
49 min Therapeutic Exercise:  [x] See flow sheet: Rationale: increase ROM, increase strength, improve coordination, improve balance and increase proprioception to improve the patients ability to improve gait, mobility 10 min Manual Therapy:  EOB knee distraction, grade 3-4 joint mobs for flexion and extension, joint line deep palpation Rationale: decrease pain, increase ROM, increase tissue extensibility and decrease trigger points to improve gait, stairs 0 min Gait Training:   
Rationale: increase ROM, increase strength, improve coordination, improve balance and increase proprioception to improve the patients ability to improve gait, mobility X min Patient Education: [x] Review HEP Other Objective/Functional Measures:  
 Patient challenged by L2 on bike AROM post MT - 0-122, end of treatment 117 Palpation - warmth to touch - no signs of infection Pain Level (0-10 scale) post treatment: 0-1 ASSESSMENT/Changes in Function:  
Pt reports NMES did not cause adverse reaction. Cont NMES treatment to focus on quad strengthening with progression to SAQ with ECC lowering during contraction phase. Small decrease in ROM from imm post manual to end of treatment, but AROM cont to improve. Patient will continue to benefit from skilled PT services to modify and progress therapeutic interventions, address functional mobility deficits, address ROM deficits, address strength deficits, analyze and address soft tissue restrictions, analyze and cue movement patterns, analyze and modify body mechanics/ergonomics, assess and modify postural abnormalities and address imbalance/dizziness to attain remaining goals. []  See Plan of Care [x]  See progress note/recertification 4/1/32 []  See Discharge Summary Progress towards goals / Updated goals:  
New/Cont'd Goals to be achieved in __8-12__  treatments: 1.   Patient will be (I) with finalized HEP in preparation for D/C.goal progressing - patient reports fair tolerance to HEP after returning to work sec to [facility] 9/18/18 2. Patient will demo increased AROM knee flexion to 120 for ease with transfers. 3.  Patient will demo AROM knee extension to 0 deg for normalized gait. Goal me t- 0 deg ext 9/18/18 4. Patient will demo 4/5 knee extension strength for ease with stair negotiation. PLAN [x]  Upgrade activities as tolerated     [x]  Continue plan of care 
[]  Update interventions per flow sheet      
[]  Discharge due to:_  
[x]  Other: assess response to ECC NMES Stacie Zhao, PT  9/18/2018

## 2018-09-20 ENCOUNTER — OFFICE VISIT (OUTPATIENT)
Dept: FAMILY MEDICINE CLINIC | Age: 58
End: 2018-09-20

## 2018-09-20 ENCOUNTER — APPOINTMENT (OUTPATIENT)
Dept: PHYSICAL THERAPY | Age: 58
End: 2018-09-20
Payer: COMMERCIAL

## 2018-09-20 VITALS
HEART RATE: 77 BPM | BODY MASS INDEX: 26.5 KG/M2 | SYSTOLIC BLOOD PRESSURE: 132 MMHG | WEIGHT: 155.2 LBS | RESPIRATION RATE: 17 BRPM | HEIGHT: 64 IN | OXYGEN SATURATION: 98 % | TEMPERATURE: 96.8 F | DIASTOLIC BLOOD PRESSURE: 83 MMHG

## 2018-09-20 DIAGNOSIS — G89.18 POST-OP PAIN: ICD-10-CM

## 2018-09-20 DIAGNOSIS — I10 ESSENTIAL HYPERTENSION: ICD-10-CM

## 2018-09-20 DIAGNOSIS — B30.9 ACUTE VIRAL CONJUNCTIVITIS OF RIGHT EYE: Primary | ICD-10-CM

## 2018-09-20 RX ORDER — IBUPROFEN 800 MG/1
800 TABLET ORAL
Qty: 30 TAB | Refills: 1 | Status: SHIPPED | OUTPATIENT
Start: 2018-09-20 | End: 2018-12-26

## 2018-09-20 NOTE — PROGRESS NOTES
Sultana Suárez is a 62 y.o female that is present in the office for a same day appointment for possible pink eye. 
 
1. Have you been to the ER, urgent care clinic since your last visit? Hospitalized since your last visit? No 
 
2. Have you seen or consulted any other health care providers outside of the [address] [place] [address] since your last visit? Include any pap smears or colon screening. No  
 
Health Maintenance Due Topic Date Due  
 DTaP/Tdap/Td series (1 - Tdap) 02/04/1981  PAP AKA CERVICAL CYTOLOGY  02/04/1981  
 EYE EXAM RETINAL OR DILATED Q1  05/29/2018

## 2018-09-20 NOTE — LETTER
NOTIFICATION RETURN TO WORK  
 
9/20/2018 3:43 PM 
 
Ms. Rosaura Vargas [address] [facility] [address] [id number] To Whom It May Concern: 
 
Rosaura Vargas is currently under the care of [facility]. She will return to work on: 9/24/2018 If there are questions or concerns please have the patient contact our office. Sincerely, Gail Chawla MD

## 2018-09-20 NOTE — PROGRESS NOTES
Lester Saldivar is a 62 y.o.  female and presents with Chief Complaint Patient presents with [address]  Knee Pain Right knee pain Subjective: 
Conjunctivitis Patient presents for evaluation of redness. She has noticed the above symptoms in the right eye for 1 day. Onset was sudden. Symptoms have included discharge, itching. Patient denies blurred vision, visual field deficit, photophobia. There is a history of children at school with conjunctivitis. Hypertension Patient is here for follow-up of hypertension. She indicates that she is feeling well and denies any symptoms referable to her hypertension. She is exercising and is adherent to low salt diet. Blood pressure is well controlled at home. Use of agents associated with hypertension: none. 
  
Osteoarthritis and Chronic Pain: 
Patient has osteoarthritis, primarily affecting the knees. Symptoms onset: problem is longstanding. Rheumatological ROS: increasing significant pain in knees and legs. She c/o increasing size of lumps on her back left leg. Response to treatment plan: she is followed by dr. Esau Gitelman s/p joint replacement; she is undergoing physical therapy. She has used tramadol with minimal benefit. Patient Active Problem List  
Diagnosis Code  Osteoarthritis of left knee M17.12  
 Hypertension I10  
 Hyperlipidemia E78.5  GERD (gastroesophageal reflux disease) K21.9  Asthma J45.909  Type 2 diabetes mellitus (HCC) E11.9  
 SBO (small bowel obstruction) ([address] K56.609  Chronic abdominal pain R10.9, G89.29  
 Post-operative pain G89.18  Chest pain R07.9  Essential hypertension, benign I10  
 Sural neuritis G57.80  Type 2 diabetes mellitus with nephropathy (HCC) E11.21  
 Non-intractable cyclical vomiting with nausea G43. A0  Bowel obstruction ([address] G96.528 Patient Active Problem List  
 Diagnosis Date Noted  Bowel obstruction ([address] 08/23/2018  Non-intractable cyclical vomiting with nausea 01/24/2018  Type 2 diabetes mellitus with nephropathy ([facility] 01/19/2018  Chronic abdominal pain 03/14/2017  Post-operative pain 03/14/2017  
 SBO (small bowel obstruction) ([facility] 02/21/2017  Osteoarthritis of left knee 06/27/2016  Hypertension 06/27/2016  Hyperlipidemia 06/27/2016  GERD (gastroesophageal reflux disease) 06/27/2016  Asthma 06/27/2016  Type 2 diabetes mellitus ([facility] 06/27/2016  Sural neuritis 06/23/2014  Chest pain 04/20/2014  Essential hypertension, benign 02/27/2012 Current Outpatient Prescriptions Medication Sig Dispense Refill  traMADol (ULTRAM) 50 mg tablet Take 2 Tabs by mouth every six (6) hours as needed for Pain. Max Daily Amount: 400 mg. 60 Tab 0  
 metFORMIN (GLUMETZA ER) 500 mg TG24 24 hour tablet take 1 tablet by mouth twice a day 60 Tab 2  
 magnesium citrate solution Take 1/3 of bottle every 8 hours until finished or until you have a bowel movement. 1 Bottle 0  
 ondansetron (ZOFRAN ODT) 4 mg disintegrating tablet Take 1 Tab by mouth every eight (8) hours as needed for Nausea. 12 Tab 0  
 bisoprolol-hydroCHLOROthiazide (ZIAC) 2.5-6.25 mg per tablet Take 1 Tab by mouth daily. 90 Tab 3  polyethylene glycol (MIRALAX) 17 gram/dose powder Take 17 g by mouth two (2) times a day. 1 capful with 8 oz of water daily 119 g 0  
 senna (SENOKOT) 8.6 mg tablet Take 1 Tab by mouth daily. 30 Tab 0  
 amLODIPine (NORVASC) 2.5 mg tablet take 1 tablet by mouth NIGHTLY 90 Tab 1  ALLERGY RELIEF, CETIRIZINE, 10 mg tablet take 1 tablet by mouth once daily  0  
 doxepin (SINEQUAN) 10 mg capsule Take 1 Cap by mouth nightly. 30 Cap 11  
 dapagliflozin (FARXIGA) 10 mg tab tablet Take 1 Tab by mouth daily. Indications: type 2 diabetes mellitus 90 Tab 3  
 omeprazole (PRILOSEC) 10 mg capsule Take 1 Cap by mouth daily.  30 Cap 11  
 albuterol (PROVENTIL HFA, VENTOLIN HFA, PROAIR HFA) 90 mcg/actuation inhaler Take 1 Puff by inhalation every six (6) hours as needed for Wheezing. 1 Inhaler 0  
 atorvastatin (LIPITOR) 40 mg tablet Take 1 Tab by mouth nightly. 90 Tab 3  
 glucose blood VI test strips (ONETOUCH ULTRA TEST) strip Check blood glucose as directed 100 Strip 1 Allergies Allergen Reactions  Macrodantin [Nitrofurantoin Macrocrystalline] Itching and Other (comments)  Tape [Adhesive] Other (comments) Paper tape-- feels like burning skin Burned skin when had wound vac Past Medical History:  
Diagnosis Date  Abdominal adhesions  Anemia  Arthritis   
 all over the body  Asthma  Cocaine abuse  Diabetes mellitus  Dyskinesia   
 bilateral  
 Essential hypertension  GERD (gastroesophageal reflux disease)  Hypertension  Menopause  Microhematuria  Stool color black Past Surgical History:  
Procedure Laterality Date  HX CHOLECYSTECTOMY  6/28/10  
 HX COLONOSCOPY    
 HX ENDOSCOPY    
 HX HERNIA REPAIR    
 HX HYSTERECTOMY Fibroids  HX KNEE REPLACEMENT Left  HX OOPHORECTOMY  12/2000  HX ORTHOPAEDIC Right   
 ankle- multiple surgeries  HX SMALL BOWEL RESECTION  12/2000  HX SMALL BOWEL RESECTION  02/21/2017 Dr. Camila Rojas [address] [facility] Family History Problem Relation Age of Onset  Hypertension Other   
  parent  Breast Cancer Other 21  
 Heart Disease Father  Hypertension Father  Diabetes Father  Diabetes Mother  Hypertension Other   
  sibling  Diabetes Other   
  parent  Diabetes Sister  Kidney Disease Brother  Diabetes Brother Social History Substance Use Topics  Smoking status: Never Smoker  Smokeless tobacco: Never Used  Alcohol use No  
 
 
ROS Constitutional: Negative for chills and fever. HENT: Negative for ear pain and sore throat.   
Eyes: Negative for pain and visual disturbance. Respiratory: Negative for cough and shortness of breath.   
Cardiovascular: Negative for chest pain and palpitations. Gastrointestinal: no bowel symptom changes Genitourinary: Negative for flank pain. Musculoskeletal: Negative for back pain and neck pain. Neurological: Negative for syncope and headaches. Psychiatric/Behavioral: Negative for agitation. She denies anxiety All other systems reviewed and are negative. Objective: 
Vitals:  
 09/20/18 1507 BP: 132/83 Pulse: 77 Resp: 17 Temp: 96.8 °F (36 °C) TempSrc: Oral  
SpO2: 98% Weight: 155 lb 3.2 oz (70.4 kg) Height: 5' 4\" (1.626 m) PainSc:  10 - Worst pain ever PainLoc: Knee Constitutional: She is oriented to person, place, and time. She appears well-developed and well-nourished. She appears calm HENT:  
Head: Normocephalic and atraumatic. Mouth/Throat: Oropharynx is clear and moist.  
Eyes: conjunctival erythema Neck: Neck supple. No tracheal deviation present. Cardiovascular: Regular rhythm.   
No murmur heard. Pulmonary/Chest: Effort normal and breath sounds normal. No respiratory distress. Abdominal: Soft. No tenderness Musculoskeletal: right knee with pain with motion Neurological: antalgic gait; No gross neuro deficit  
 
Assessment/Plan: 1. Acute viral conjunctivitis of right eye Keep clean and avoid touching, wash hands, cold compress and artificial tears for comfort; note provided for work 2. Post-op pain 
nsaid - ibuprofen (MOTRIN) 800 mg tablet; Take 1 Tab by mouth every eight (8) hours as needed for Pain. Dispense: 30 Tab; Refill: 1 3. Essential hypertension Goal <130/80; borderline controlled; continue current treatment Lab review: no lab studies available for review at time of visit I have discussed the diagnosis with the patient and the intended plan as seen in the above orders.   The patient has received an after-visit summary and questions were answered concerning future plans. I have discussed medication side effects and warnings with the patient as well. I have reviewed the plan of care with the patient, accepted their input and they are in agreement with the treatment goals. Follow-up Disposition: 
Return if symptoms worsen or fail to improve.

## 2018-09-20 NOTE — PATIENT INSTRUCTIONS
Pinkeye: Care Instructions Your Care Instructions Pinkeye is redness and swelling of the eye surface and the conjunctiva (the lining of the eyelid and the covering of the white part of the eye). Pinkeye is also called conjunctivitis. Pinkeye is often caused by infection with bacteria or a virus. Dry air, allergies, smoke, and chemicals are other common causes. Pinkeye often clears on its own in 7 to 10 days. Antibiotics only help if the pinkeye is caused by bacteria. Pinkeye caused by infection spreads easily. If an allergy or chemical is causing pinkeye, it will not go away unless you can avoid whatever is causing it. Follow-up care is a key part of your treatment and safety. Be sure to make and go to all appointments, and call your doctor if you are having problems. It's also a good idea to know your test results and keep a list of the medicines you take. How can you care for yourself at home? · Wash your hands often. Always wash them before and after you treat pinkeye or touch your eyes or face. · Use moist cotton or a clean, wet cloth to remove crust. Wipe from the inside corner of the eye to the outside. Use a clean part of the cloth for each wipe. · Put cold or warm wet cloths on your eye a few times a day if the eye hurts. · Do not wear contact lenses or eye makeup until the pinkeye is gone. Throw away any eye makeup you were using when you got pinkeye. Clean your contacts and storage case. If you wear disposable contacts, use a new pair when your eye has cleared and it is safe to wear contacts again. · If the doctor gave you antibiotic ointment or eyedrops, use them as directed. Use the medicine for as long as instructed, even if your eye starts looking better soon. Keep the bottle tip clean, and do not let it touch the eye area. · To put in eyedrops or ointment: ¨ Tilt your head back, and pull your lower eyelid down with one finger. ¨ Drop or squirt the medicine inside the lower lid. ¨ Close your eye for 30 to 60 seconds to let the drops or ointment move around. ¨ Do not touch the ointment or dropper tip to your eyelashes or any other surface. · Do not share towels, pillows, or washcloths while you have pinkeye. When should you call for help? Call your doctor now or seek immediate medical care if: 
  · You have pain in your eye, not just irritation on the surface.  
  · You have a change in vision or loss of vision.  
  · You have an increase in discharge from the eye.  
  · Your eye has not started to improve or begins to get worse within 48 hours after you start using antibiotics.  
  · Pinkeye lasts longer than 7 days.  
 Watch closely for changes in your health, and be sure to contact your doctor if you have any problems. Where can you learn more? Go to http://betzy-didi.info/. Enter Y392 in the search box to learn more about \"Pinkeye: Care Instructions. \" Current as of: November 20, 2017 Content Version: 11.7 © 4638-6316 [employer]. Care instructions adapted under license by [employer] (which disclaims liability or warranty for this information). If you have questions about a medical condition or this instruction, always ask your healthcare professional. [address] any warranty or liability for your use of this information.

## 2018-09-20 NOTE — MR AVS SNAPSHOT
[address] [facility] 
 
 
 [address] [facility] [address] [place] [address] [phone] 
[phone] Patient: Iris Cardenas MRN: [id number] ZDZ:[birth year] Visit Information Date & Time Provider Department Dept. Phone Encounter #  
 9/20/2018  3:00 PM Kandice Leventhal, 5501 [facility] [id number] Follow-up Instructions Return if symptoms worsen or fail to improve. Your Appointments 11/6/2018 10:45 AM  
ROUTINE CARE with Kandice Leventhal, MD  
[address] [address] 365[address] [facility]) Appt Note: 2 months f/u for diabetes [id number] [facility] [address] [facility] 83 222 [facility]  
  
   
 [address] [facility]  
  
    
 1/24/2019  2:45 PM  
ESTABLISHED PATIENT with Major Rubinstein, MD  
[facility] 365[address] [facility]) Appt Note: return in 1 year [address] 2201 [facility] [address]  
[phone]  
  
   
 [place] [id number] Upcoming Health Maintenance Date Due DTaP/Tdap/Td series (1 - Tdap) 2/4/1981 PAP AKA CERVICAL CYTOLOGY 2/4/1981 EYE EXAM RETINAL OR DILATED Q1 5/29/2018 HEMOGLOBIN A1C Q6M 2/23/2019 FOOT EXAM Q1 6/12/2019 MICROALBUMIN Q1 6/12/2019 LIPID PANEL Q1 6/12/2019 BREAST CANCER SCRN MAMMOGRAM 11/24/2019 COLONOSCOPY 3/3/2026 Allergies as of 9/20/2018  Review Complete On: 9/20/2018 By: Kandice Leventhal, MD  
  
 Severity Noted Reaction Type Reactions Macrodantin [Nitrofurantoin Macrocrystalline]  01/31/2012    Itching, Other (comments) Tape [Adhesive]  09/30/2014    Other (comments) Paper tape-- feels like burning skin Burned skin when had wound vac Current Immunizations  Reviewed on 6/12/2018 Name Date Influenza Vaccine 11/15/2016 Influenza Vaccine (Quad) PF 9/7/2018 Pneumococcal Polysaccharide (PPSV-23) 6/12/2018 Not reviewed this visit You Were Diagnosed With   
  
 Codes Comments Acute viral conjunctivitis of right eye    -  Primary ICD-10-CM: B30.9 ICD-9-CM: 077.99 Post-op pain     ICD-10-CM: G89.18 
ICD-9-CM: 338.18 Essential hypertension     ICD-10-CM: I10 
ICD-9-CM: 401.9 Vitals BP Pulse Temp Resp Height(growth percentile) Weight(growth percentile) 132/83 (BP 1 Location: Right arm, BP Patient Position: Sitting) 77 96.8 °F (36 °C) (Oral) 17 5' 4\" (1.626 m) 155 lb 3.2 oz (70.4 kg) SpO2 BMI OB Status Smoking Status 98% 26.64 kg/m2 Hysterectomy Never Smoker BMI and BSA Data Body Mass Index Body Surface Area  
 26.64 kg/m 2 1.78 m 2 Preferred Pharmacy Pharmacy Name Phone [facility]  [facility] [phone] Your Updated Medication List  
  
   
This list is accurate as of 9/20/18  3:43 PM.  Always use your most recent med list.  
  
  
  
  
 albuterol 90 mcg/actuation inhaler Commonly known as:  PROVENTIL HFA, VENTOLIN HFA, PROAIR HFA Take 1 Puff by inhalation every six (6) hours as needed for Wheezing. ALLERGY RELIEF (CETIRIZINE) 10 mg tablet Generic drug:  cetirizine  
take 1 tablet by mouth once daily  
  
 amLODIPine 2.5 mg tablet Commonly known as:  NORVASC  
take 1 tablet by mouth NIGHTLY  
  
 atorvastatin 40 mg tablet Commonly known as:  LIPITOR Take 1 Tab by mouth nightly. bisoprolol-hydroCHLOROthiazide 2.5-6.25 mg per tablet Commonly known as:  [facility] Take 1 Tab by mouth daily. dapagliflozin 10 mg Tab tablet Commonly known as:  [employer] Take 1 Tab by mouth daily. Indications: type 2 diabetes mellitus  
  
 doxepin 10 mg capsule Commonly known as:  SINEquan Take 1 Cap by mouth nightly. glucose blood VI test strips strip Commonly known as:  ONETOUCH ULTRA TEST Check blood glucose as directed  
  
 ibuprofen 800 mg tablet Commonly known as:  MOTRIN  
 Take 1 Tab by mouth every eight (8) hours as needed for Pain.  
  
 magnesium citrate solution Take 1/3 of bottle every 8 hours until finished or until you have a bowel movement. metFORMIN 500 mg Tg24 24 hour tablet Commonly known as:  GLUMETZA ER  
take 1 tablet by mouth twice a day  
  
 omeprazole 10 mg capsule Commonly known as:  PRILOSEC Take 1 Cap by mouth daily. ondansetron 4 mg disintegrating tablet Commonly known as:  ZOFRAN ODT Take 1 Tab by mouth every eight (8) hours as needed for Nausea. polyethylene glycol 17 gram/dose powder Commonly known as:  Chelle Clos Take 17 g by mouth two (2) times a day. 1 capful with 8 oz of water daily  
  
 senna 8.6 mg tablet Commonly known as:  Peter Jeromesimona Clotilde Take 1 Tab by mouth daily. traMADol 50 mg tablet Commonly known as:  ULTRAM  
Take 2 Tabs by mouth every six (6) hours as needed for Pain. Max Daily Amount: 400 mg. Prescriptions Sent to Pharmacy Refills  
 ibuprofen (MOTRIN) 800 mg tablet 1 Sig: Take 1 Tab by mouth every eight (8) hours as needed for Pain. Class: Normal  
 Pharmacy: [facility], [address] [facility] Ph #: [phone] Route: Oral  
  
Follow-up Instructions Return if symptoms worsen or fail to improve. To-Do List   
 09/21/2018 10:00 AM  
  Appointment with [address] at [place] [address] ([phone])  
  
 09/21/2018 10:15 AM  
  [facility] [address] [place] [address] ([phone])  
  
 09/25/2018 4:00 PM  
  [facility] ([phone])  
  
 09/27/2018 4:00 PM  
  Appointment with Jovita Eckert, PT at [facility] ([phone]) Patient Instructions Pinkeye: Care Instructions Your Care Instructions Pinkeye is redness and swelling of the eye surface and the conjunctiva (the lining of the eyelid and the covering of the white part of the eye). Pinkeye is also called conjunctivitis. Pinkeye is often caused by infection with bacteria or a virus. Dry air, allergies, smoke, and chemicals are other common causes. Pinkeye often clears on its own in 7 to 10 days. Antibiotics only help if the pinkeye is caused by bacteria. Pinkeye caused by infection spreads easily. If an allergy or chemical is causing pinkeye, it will not go away unless you can avoid whatever is causing it. Follow-up care is a key part of your treatment and safety. Be sure to make and go to all appointments, and call your doctor if you are having problems. It's also a good idea to know your test results and keep a list of the medicines you take. How can you care for yourself at home? · Wash your hands often. Always wash them before and after you treat pinkeye or touch your eyes or face. · Use moist cotton or a clean, wet cloth to remove crust. Wipe from the inside corner of the eye to the outside. Use a clean part of the cloth for each wipe. · Put cold or warm wet cloths on your eye a few times a day if the eye hurts. · Do not wear contact lenses or eye makeup until the pinkeye is gone. Throw away any eye makeup you were using when you got pinkeye. Clean your contacts and storage case. If you wear disposable contacts, use a new pair when your eye has cleared and it is safe to wear contacts again. · If the doctor gave you antibiotic ointment or eyedrops, use them as directed. Use the medicine for as long as instructed, even if your eye starts looking better soon. Keep the bottle tip clean, and do not let it touch the eye area. · To put in eyedrops or ointment: ¨ Tilt your head back, and pull your lower eyelid down with one finger. ¨ Drop or squirt the medicine inside the lower lid. ¨ Close your eye for 30 to 60 seconds to let the drops or ointment move around. ¨ Do not touch the ointment or dropper tip to your eyelashes or any other surface. · Do not share towels, pillows, or washcloths while you have pinkeye. When should you call for help? Call your doctor now or seek immediate medical care if: 
  · You have pain in your eye, not just irritation on the surface.  
  · You have a change in vision or loss of vision.  
  · You have an increase in discharge from the eye.  
  · Your eye has not started to improve or begins to get worse within 48 hours after you start using antibiotics.  
  · Pinkeye lasts longer than 7 days.  
 Watch closely for changes in your health, and be sure to contact your doctor if you have any problems. Where can you learn more? Go to http://betzy-didi.info/. Enter Y392 in the search box to learn more about \"Pinkeye: Care Instructions. \" Current as of: November 20, 2017 Content Version: 11.7 © 2288-4623 [employer]. Care instructions adapted under license by [employer] (which disclaims liability or warranty for this information). If you have questions about a medical condition or this instruction, always ask your healthcare professional. [address] any warranty or liability for your use of this information. Introducing [facility]! Ochoa Vazquez introduces Isotera patient portal. Now you can access parts of your medical record, email your doctor's office, and request medication refills online. 1. In your internet browser, go to [email or web address] 2. Click on the First Time User? Click Here link in the Sign In box. You will see the New Member Sign Up page. 3. Enter your Isotera Access Code exactly as it appears below. You will not need to use this code after youve completed the sign-up process. If you do not sign up before the expiration date, you must request a new code. · Isotera Access Code: [email or web address] Expires: 12/10/2018  4:08 PM 
 
4. Enter the last four digits of your Social Security Number (xxxx) and Date of Birth (mm/dd/yyyy) as indicated and click Submit. You will be taken to the next sign-up page. 5. Create a Animated Dynamics ID. This will be your An[employer] login ID and cannot be changed, so think of one that is secure and easy to remember. 6. Create a [employer] password. You can change your password at any time. 7. Enter your Password Reset Question and Answer. This can be used at a later time if you forget your password. 8. Enter your e-mail address. You will receive e-mail notification when new information is available in [address] 9. Click Sign Up. You can now view and download portions of your medical record. 10. Click the Download Summary menu link to download a portable copy of your medical information. If you have questions, please visit the Frequently Asked Questions section of the [employer] website. Remember, [employer] is NOT to be used for urgent needs. For medical emergencies, dial 911. Now available from your iPhone and Android! Please provide this summary of care documentation to your next provider. Your primary care clinician is listed as Orlan Halsted. If you have any questions after today's visit, please call [phone].

## 2018-09-21 ENCOUNTER — HOSPITAL ENCOUNTER (OUTPATIENT)
Dept: SLEEP MEDICINE | Age: 58
Discharge: HOME | End: 2018-09-21
Attending: PSYCHIATRY & NEUROLOGY
Payer: COMMERCIAL

## 2018-09-21 DIAGNOSIS — G47.33 OSA (OBSTRUCTIVE SLEEP APNEA): ICD-10-CM

## 2018-09-21 DIAGNOSIS — K21.9 GERD (GASTROESOPHAGEAL REFLUX DISEASE): ICD-10-CM

## 2018-09-21 DIAGNOSIS — E11.9 TYPE II DIABETES MELLITUS (HCC): ICD-10-CM

## 2018-09-21 DIAGNOSIS — J45.909 ASTHMA: ICD-10-CM

## 2018-09-21 DIAGNOSIS — E78.5 HYPERLIPIDEMIA: ICD-10-CM

## 2018-09-21 PROCEDURE — 95806 SLEEP STUDY UNATT&RESP EFFT: CPT

## 2018-09-21 NOTE — PROGRESS NOTES
Patient Instructions Should you need assistance after arriving home before 4:00 p.m. on a week day: 
please call the sleep center at [phone]. After hours or week-end:  
 
Please page the on-call Technologist at [phone] When you are ready for bed and to begin the study, please follow these instructions: 
 
1. Apply the device and components as demonstrated on the instruction sheet provided. 2. Apply respiratory belt and device around chest, located at nipple line and tighten to a snug fit. 3. Attach the nasal cannula filter to the port on the side of device and then place prongs in the nostrils and around ears, tightening the slider for a more secure fit. Be sure to use a medical tape to secure the plastic tubing to the patients cheeks. 4. Place the pulse Ox probe on the pointer figure. 5. Press the on button located on the device to initiate recording. Note 3 corners (refer to patient instructions) of the device should have a green light which will illuminate if sensors are functioning properly. 6. On the Home Study Activity Log (located below), document the date and time you are starting your study. Activity should be In Bed. 7. Remember 8-9 hours of recording (bed-time) with at least 6 hours of sleep is necessary in effort to capture as much valid data as possible is needed. To end the Home Sleep Test: 
1. When you wake up for the day, record on the study log date/time (a.m. /p.m.) that you woke up. 2. Gently remove all sensors 3. Return it to the case provided Return the unit in its entirety to the sleep center as discussed with the technologist.

## 2018-09-24 NOTE — PROGRESS NOTES
Apnea link completed with patient on room air  for evaluation of sleep related breathing disorder per physician order. Preliminary results of apnea link appear positive. Sleep evaluation and follow up testing is recommended. To schedule an appointment with the sleep center upon discharge call [phone]

## 2018-09-25 ENCOUNTER — APPOINTMENT (OUTPATIENT)
Dept: PHYSICAL THERAPY | Age: 58
End: 2018-09-25
Payer: COMMERCIAL

## 2018-09-25 ENCOUNTER — OFFICE VISIT (OUTPATIENT)
Dept: FAMILY MEDICINE CLINIC | Age: 58
End: 2018-09-25

## 2018-09-25 VITALS
SYSTOLIC BLOOD PRESSURE: 158 MMHG | DIASTOLIC BLOOD PRESSURE: 105 MMHG | WEIGHT: 155.2 LBS | TEMPERATURE: 98 F | HEART RATE: 93 BPM | OXYGEN SATURATION: 99 % | HEIGHT: 64 IN | BODY MASS INDEX: 26.5 KG/M2 | RESPIRATION RATE: 16 BRPM

## 2018-09-25 DIAGNOSIS — J06.9 VIRAL URI: ICD-10-CM

## 2018-09-25 DIAGNOSIS — I10 ESSENTIAL HYPERTENSION: Chronic | ICD-10-CM

## 2018-09-25 DIAGNOSIS — M17.12 PRIMARY OSTEOARTHRITIS OF LEFT KNEE: ICD-10-CM

## 2018-09-25 DIAGNOSIS — J04.0 LARYNGITIS: Primary | ICD-10-CM

## 2018-09-25 RX ORDER — ACETAMINOPHEN AND CODEINE PHOSPHATE 300; 30 MG/1; MG/1
1 TABLET ORAL
Qty: 12 TAB | Refills: 0 | Status: SHIPPED | OUTPATIENT
Start: 2018-09-25 | End: 2018-11-06 | Stop reason: ALTCHOICE

## 2018-09-25 NOTE — PROGRESS NOTES
Jermaine Joshua is a 62 y.o female that is present in the office for a same day appointment for cold symptoms since Saturday. 1. Have you been to the ER, urgent care clinic since your last visit? Hospitalized since your last visit? No 
 
2. Have you seen or consulted any other health care providers outside of the [facility] since your last visit? Include any pap smears or colon screening. No  
 
Health Maintenance Due Topic Date Due  
 DTaP/Tdap/Td series (1 - Tdap) 02/04/1981  PAP AKA CERVICAL CYTOLOGY  02/04/1981  Shingrix Vaccine Age 50> (1 of 2) 02/04/2010  
 EYE EXAM RETINAL OR DILATED Q1  05/29/2018

## 2018-09-25 NOTE — PATIENT INSTRUCTIONS
Laryngitis: Care Instructions Your Care Instructions Laryngitis is an inflammation of the voice box (larynx) that causes your voice to become raspy or hoarse. It can be short-lived or long-lasting. Most of the time, laryngitis comes on quickly and lasts as long as 2 weeks. It is caused by overuse, irritation, or infection of the vocal cords inside the larynx. Some of the most common causes are a cold, the flu, or allergies. Loud talking, shouting, cheering, or singing also can cause laryngitis. Stomach acid that backs up into the throat also can make you lose your voice. Resting your voice and taking other steps at home can help you get your voice back. Follow-up care is a key part of your treatment and safety. Be sure to make and go to all appointments, and call your doctor if you are having problems. It's also a good idea to know your test results and keep a list of the medicines you take. How can you care for yourself at home? · Follow your doctor's directions for treating the condition that caused you to lose your voice. If your doctor prescribed antibiotics, take them as directed. Do not stop taking them just because you feel better. You need to take the full course of antibiotics. · Before you use cough and cold medicines, check the label. They may not be safe for young children or for people with certain health problems. · Try to keep stomach acid from backing up into your throat. Do not eat just before bedtime. Reduce the amount of coffee and alcohol you drink, and eat healthy foods. Taking over-the-counter acid reducers can help when these steps are not enough. In some cases, you may need prescription medicine. · Rest your voice. You do not have to stop speaking, but use your voice as little as possible. Speak softly but do not whisper; whispering can bother your larynx more than speaking softly. Avoid talking on the telephone or trying to speak loudly. · Try not to clear your throat. This can cause more irritation of your larynx. Take an over-the-counter cough suppressant (if your doctor recommends it) if you have a dry cough that does not produce mucus. · Do not smoke or allow others to smoke around you. If you need help quitting, talk to your doctor about stop-smoking programs and medicines. These can increase your chances of quitting for good. · Use a humidifier or vaporizer to add moisture to your bedroom. Humidity helps to thin the mucus in the nasal membranes that causes stuffiness or postnasal drip. Follow the directions for cleaning the machine. · Drink plenty of fluids, enough so that your urine is light yellow or clear like water. If you have kidney, heart, or liver disease and have to limit fluids, talk with your doctor before you increase the amount of fluids you drink. · Use saline (saltwater) nasal washes to help keep your nasal passages open and wash out mucus and bacteria. You can buy saline nose drops at a grocery store or drugstore. Or, you can make your own at home by mixing ½ teaspoon salt, 1 cup water (at room temperature), and ½ teaspoon baking soda. If you make your own, fill a bulb syringe with the solution, insert the tip into your nostril, and squeeze gently. Alease Ruffini your nose. When should you call for help? Call 911 anytime you think you may need emergency care. For example, call if: 
  · You have trouble breathing.  
 Call your doctor now or seek immediate medical care if: 
  · You have new or worse pain.  
  · You have trouble swallowing.  
 Watch closely for changes in your health, and be sure to contact your doctor if: 
  · You do not get better as expected. Where can you learn more? Go to http://betzy-didi.info/. Enter X380 in the search box to learn more about \"Laryngitis: Care Instructions. \" Current as of: May 12, 2017 Content Version: 11.7 © 4416-5277 [employer]. Care instructions adapted under license by [employer] (which disclaims liability or warranty for this information). If you have questions about a medical condition or this instruction, always ask your healthcare professional. [address] any warranty or liability for your use of this information.

## 2018-09-25 NOTE — LETTER
NOTIFICATION RETURN TO WORK  
 
9/25/2018 9:45 AM 
 
Ms. Mayank Rodriguez [address] [facility] [address] [phone] To Whom It May Concern: 
 
Mayank Rodriguez is currently under the care of [address] [place] [address]. She will return to work on: 9/26/2018 If there are questions or concerns please have the patient contact our office. Sincerely, Aron Miguel MD

## 2018-09-25 NOTE — PROGRESS NOTES
Frank Reeves is a 62 y.o.  female and presents with Chief Complaint Patient presents with  Cold Symptoms Subjective: 
Upper Respiratory Infection Patient complains of symptoms of a URI. Symptoms include congestion, cough and loss of voice. Onset of symptoms was 3 days ago, gradually worsening since that time. She also c/o achiness, congestion and chest pain with coughing for the past 3 days . She is drinking plenty of fluids. . Evaluation to date: none. Treatment to date: decongestants. nyquil Hypertension Patient is here for follow-up of hypertension.  She indicates that she is feeling well and denies any symptoms referable to her hypertension. She is exercising and is adherent to low salt diet.  Blood pressure is well controlled at home. Use of agents associated with hypertension: none. 
   
Osteoarthritis and Chronic Pain: 
Patient has osteoarthritis, primarily affecting the knees. Symptoms onset: problem is longstanding. Rheumatological ROS: increasing significant pain in knees and legs. She c/o increasing size of lumps on her back left leg.   
Response to treatment plan: she is followed by dr. Scott Neighbor s/p joint replacement; she is undergoing physical therapy. Amelie Paris has used tramadol with minimal benefit.    
Patient Active Problem List  
Diagnosis Code  Osteoarthritis of left knee M17.12  
 Hypertension I10  
 Hyperlipidemia E78.5  GERD (gastroesophageal reflux disease) K21.9  Asthma J45.909  Type 2 diabetes mellitus (HCC) E11.9  
 SBO (small bowel obstruction) ([facility] K56.609  Chronic abdominal pain R10.9, G89.29  
 Post-operative pain G89.18  Chest pain R07.9  Essential hypertension, benign I10  
 Sural neuritis G57.80  Type 2 diabetes mellitus with nephropathy (HCC) E11.21  
 Non-intractable cyclical vomiting with nausea G43. A0  Bowel obstruction ([address] Z40.036 Patient Active Problem List  
 Diagnosis Date Noted  Bowel obstruction ([facility] 08/23/2018  Non-intractable cyclical vomiting with nausea 01/24/2018  Type 2 diabetes mellitus with nephropathy ([facility] 01/19/2018  Chronic abdominal pain 03/14/2017  Post-operative pain 03/14/2017  
 SBO (small bowel obstruction) ([facility] 02/21/2017  Osteoarthritis of left knee 06/27/2016  Hypertension 06/27/2016  Hyperlipidemia 06/27/2016  GERD (gastroesophageal reflux disease) 06/27/2016  Asthma 06/27/2016  Type 2 diabetes mellitus ([facility] 06/27/2016  Sural neuritis 06/23/2014  Chest pain 04/20/2014  Essential hypertension, benign 02/27/2012 Current Outpatient Prescriptions Medication Sig Dispense Refill  ibuprofen (MOTRIN) 800 mg tablet Take 1 Tab by mouth every eight (8) hours as needed for Pain. 30 Tab 1  
 traMADol (ULTRAM) 50 mg tablet Take 2 Tabs by mouth every six (6) hours as needed for Pain. Max Daily Amount: 400 mg. 60 Tab 0  
 metFORMIN (GLUMETZA ER) 500 mg TG24 24 hour tablet take 1 tablet by mouth twice a day 60 Tab 2  
 magnesium citrate solution Take 1/3 of bottle every 8 hours until finished or until you have a bowel movement. 1 Bottle 0  
 ondansetron (ZOFRAN ODT) 4 mg disintegrating tablet Take 1 Tab by mouth every eight (8) hours as needed for Nausea. 12 Tab 0  
 bisoprolol-hydroCHLOROthiazide (ZIAC) 2.5-6.25 mg per tablet Take 1 Tab by mouth daily. 90 Tab 3  polyethylene glycol (MIRALAX) 17 gram/dose powder Take 17 g by mouth two (2) times a day. 1 capful with 8 oz of water daily 119 g 0  
 senna (SENOKOT) 8.6 mg tablet Take 1 Tab by mouth daily. 30 Tab 0  
 amLODIPine (NORVASC) 2.5 mg tablet take 1 tablet by mouth NIGHTLY 90 Tab 1  ALLERGY RELIEF, CETIRIZINE, 10 mg tablet take 1 tablet by mouth once daily  0  
 doxepin (SINEQUAN) 10 mg capsule Take 1 Cap by mouth nightly. 30 Cap 11  
 dapagliflozin (FARXIGA) 10 mg tab tablet Take 1 Tab by mouth daily.  Indications: type 2 diabetes mellitus 90 Tab 3  
  omeprazole (PRILOSEC) 10 mg capsule Take 1 Cap by mouth daily. 30 Cap 11  
 albuterol (PROVENTIL HFA, VENTOLIN HFA, PROAIR HFA) 90 mcg/actuation inhaler Take 1 Puff by inhalation every six (6) hours as needed for Wheezing. 1 Inhaler 0  
 atorvastatin (LIPITOR) 40 mg tablet Take 1 Tab by mouth nightly. 90 Tab 3  
 glucose blood VI test strips (ONETOUCH ULTRA TEST) strip Check blood glucose as directed 100 Strip 1 Allergies Allergen Reactions  Macrodantin [Nitrofurantoin Macrocrystalline] Itching and Other (comments)  Tape [Adhesive] Other (comments) Paper tape-- feels like burning skin Burned skin when had wound vac Past Medical History:  
Diagnosis Date  Abdominal adhesions  Anemia  Arthritis   
 all over the body  Asthma  Cocaine abuse  Diabetes mellitus  Dyskinesia   
 bilateral  
 Essential hypertension  GERD (gastroesophageal reflux disease)  Hypertension  Menopause  Microhematuria  Stool color black Past Surgical History:  
Procedure Laterality Date  HX CHOLECYSTECTOMY  6/28/10  
 HX COLONOSCOPY    
 HX ENDOSCOPY    
 HX HERNIA REPAIR    
 HX HYSTERECTOMY Fibroids  HX KNEE REPLACEMENT Left  HX OOPHORECTOMY  12/2000  HX ORTHOPAEDIC Right   
 ankle- multiple surgeries  HX SMALL BOWEL RESECTION  12/2000  HX SMALL BOWEL RESECTION  02/21/2017 Dr. Carley Gama [facility] Family History Problem Relation Age of Onset  Hypertension Other   
  parent  Breast Cancer Other 21  
 Heart Disease Father  Hypertension Father  Diabetes Father  Diabetes Mother  Hypertension Other   
  sibling  Diabetes Other   
  parent  Diabetes Sister  Kidney Disease Brother  Diabetes Brother Social History Substance Use Topics  Smoking status: Never Smoker  Smokeless tobacco: Never Used  Alcohol use No  
 
 
ROS Constitutional: Negative for chills and fever. HENT: Negative for ear pain and sore throat.   
Eyes: Negative for pain and visual disturbance. Cardiovascular: Negative for chest pain and palpitations. Gastrointestinal: no bowel symptom changes Genitourinary: Negative for flank pain. Musculoskeletal: Negative for back pain and neck pain. Neurological: Negative for syncope and headaches. Psychiatric/Behavioral: Negative for agitation. She denies anxiety All other systems reviewed and are negative. Objective: 
Vitals:  
 09/25/18 0932 BP: (!) 158/105 Pulse: 93 Resp: 16 Temp: 98 °F (36.7 °C) TempSrc: Oral  
SpO2: 99% Weight: 155 lb 3.2 oz (70.4 kg) Height: 5' 4\" (1.626 m) PainSc:   9 PainLoc: Generalized Constitutional: She is oriented to person, place, and time. She appears well-developed and well-nourished. She appears calm  
HENT: coughing; raspy voice Head: Normocephalic and atraumatic. Mouth/Throat: Oropharynx is clear and moist.  
Eyes: conjunctival erythema Neck: Neck supple. No tracheal deviation present. Cardiovascular: Regular rhythm.   
No murmur heard. Pulmonary/Chest: Effort normal and breath sounds normal. No respiratory distress Assessment/Plan: 1. Laryngitis Salt water gargle; start steroid - prednisoLONE 5 mg tab; Take 1 tab daily for 5 days  Dispense: 5 Tab; Refill: 0 
 
2. Viral URI Cough suppressant prescribed; ibuprofen for chest pain 
- acetaminophen-codeine (TYLENOL #3) 300-30 mg per tablet; Take 1 Tab by mouth every four (4) hours as needed for Pain. Max Daily Amount: 6 Tabs. Dispense: 12 Tab; Refill: 0 
 
3. Essential hypertension Goal <130/80; acutely ill; monitor 4. Primary osteoarthritis of left knee F/u with ortho Lab review: no lab studies available for review at time of visit I have discussed the diagnosis with the patient and the intended plan as seen in the above orders. The patient has received an after-visit summary and questions were answered concerning future plans. I have discussed medication side effects and warnings with the patient as well. I have reviewed the plan of care with the patient, accepted their input and they are in agreement with the treatment goals. Follow-up Disposition: 
Return if symptoms worsen or fail to improve.

## 2018-09-25 NOTE — MR AVS SNAPSHOT
[address] [facility] 
 
 
 [address] [facility] [address] [facility] [address] [address] 
[phone] Patient: Blanca Nichols MRN: [id number] ALEX:[birth year] Visit Information Date & Time Provider Department Dept. Phone Encounter #  
 9/25/2018  9:30 AM Phil Osgood, [facility] [phone] [id number] Follow-up Instructions Return if symptoms worsen or fail to improve. Your Appointments 11/6/2018 10:45 AM  
ROUTINE CARE with [place] Osgood[place]  
[address] [address]) Appt Note: 2 months f/u for diabetes [id number] [facility] [address] [place] [address] 83 222 [facility]  
  
   
 [address] [facility]  
  
    
 1/24/2019  2:45 PM  
ESTABLISHED PATIENT with Elliot Grissom MD  
[facility] [address]) Appt Note: return in 1 year [address] [address]  
[phone]  
  
   
 [place] [id number] Upcoming Health Maintenance Date Due DTaP/Tdap/Td series (1 - Tdap) 2/4/1981 PAP AKA CERVICAL CYTOLOGY 2/4/1981 Shingrix Vaccine Age 50> (1 of 2) 2/4/2010 EYE EXAM RETINAL OR DILATED Q1 5/29/2018 HEMOGLOBIN A1C Q6M 2/23/2019 FOOT EXAM Q1 6/12/2019 MICROALBUMIN Q1 6/12/2019 LIPID PANEL Q1 6/12/2019 BREAST CANCER SCRN MAMMOGRAM 11/24/2019 COLONOSCOPY 3/3/2026 Allergies as of 9/25/2018  Review Complete On: 9/25/2018 By: Phil Osgood, MD  
  
 Severity Noted Reaction Type Reactions Macrodantin [Nitrofurantoin Macrocrystalline]  01/31/2012    Itching, Other (comments) Tape [Adhesive]  09/30/2014    Other (comments) Paper tape-- feels like burning skin Burned skin when had wound vac Current Immunizations  Reviewed on 6/12/2018 Name Date Influenza Vaccine 11/15/2016 Influenza Vaccine (Quad) PF 9/7/2018 Pneumococcal Polysaccharide (PPSV-23) 6/12/2018 Not reviewed this visit You Were Diagnosed With   
  
 Codes Comments Laryngitis    -  Primary ICD-10-CM: J04.0 ICD-9-CM: 464.00 Viral URI     ICD-10-CM: J06.9 ICD-9-CM: 465.9 Essential hypertension     ICD-10-CM: I10 
ICD-9-CM: 401.9 Primary osteoarthritis of left knee     ICD-10-CM: M17.12 
ICD-9-CM: 715.16 Vitals BP Pulse Temp Resp Height(growth percentile) Weight(growth percentile) (!) 158/105 (BP 1 Location: Right arm, BP Patient Position: Sitting) 93 98 °F (36.7 °C) (Oral) 16 5' 4\" (1.626 m) 155 lb 3.2 oz (70.4 kg) SpO2 BMI OB Status Smoking Status 99% 26.64 kg/m2 Hysterectomy Never Smoker BMI and BSA Data Body Mass Index Body Surface Area  
 26.64 kg/m 2 1.78 m 2 Preferred Pharmacy Pharmacy Name Phone [facility]  [facility] [phone] Your Updated Medication List  
  
   
This list is accurate as of 9/25/18  9:45 AM.  Always use your most recent med list.  
  
  
  
  
 acetaminophen-codeine 300-30 mg per tablet Commonly known as:  TYLENOL #3 Take 1 Tab by mouth every four (4) hours as needed for Pain. Max Daily Amount: 6 Tabs. albuterol 90 mcg/actuation inhaler Commonly known as:  PROVENTIL HFA, VENTOLIN HFA, PROAIR HFA Take 1 Puff by inhalation every six (6) hours as needed for Wheezing. ALLERGY RELIEF (CETIRIZINE) 10 mg tablet Generic drug:  cetirizine  
take 1 tablet by mouth once daily  
  
 amLODIPine 2.5 mg tablet Commonly known as:  NORVASC  
take 1 tablet by mouth NIGHTLY  
  
 atorvastatin 40 mg tablet Commonly known as:  LIPITOR Take 1 Tab by mouth nightly. bisoprolol-hydroCHLOROthiazide 2.5-6.25 mg per tablet Commonly known as:  [facility] Take 1 Tab by mouth daily. dapagliflozin 10 mg Tab tablet Commonly known as:  [employer] Take 1 Tab by mouth daily. Indications: type 2 diabetes mellitus  
  
 doxepin 10 mg capsule Commonly known as:  SINEquan Take 1 Cap by mouth nightly. glucose blood VI test strips strip Commonly known as:  ONETOUCH ULTRA TEST Check blood glucose as directed  
  
 ibuprofen 800 mg tablet Commonly known as:  MOTRIN Take 1 Tab by mouth every eight (8) hours as needed for Pain.  
  
 magnesium citrate solution Take 1/3 of bottle every 8 hours until finished or until you have a bowel movement. metFORMIN 500 mg Tg24 24 hour tablet Commonly known as:  GLUMETZA ER  
take 1 tablet by mouth twice a day  
  
 omeprazole 10 mg capsule Commonly known as:  PRILOSEC Take 1 Cap by mouth daily. ondansetron 4 mg disintegrating tablet Commonly known as:  ZOFRAN ODT Take 1 Tab by mouth every eight (8) hours as needed for Nausea. polyethylene glycol 17 gram/dose powder Commonly known as:  Beola Patricio Take 17 g by mouth two (2) times a day. 1 capful with 8 oz of water daily  
  
 prednisoLONE 5 mg Tab Take 1 tab daily for 5 days  
  
 senna 8.6 mg tablet Commonly known as:  [employer] Take 1 Tab by mouth daily. traMADol 50 mg tablet Commonly known as:  ULTRAM  
Take 2 Tabs by mouth every six (6) hours as needed for Pain. Max Daily Amount: 400 mg. Prescriptions Printed Refills  
 acetaminophen-codeine (TYLENOL #3) 300-30 mg per tablet 0 Sig: Take 1 Tab by mouth every four (4) hours as needed for Pain. Max Daily Amount: 6 Tabs. Class: Print Route: Oral  
  
Prescriptions Sent to Pharmacy Refills  
 prednisoLONE 5 mg tab 0 Sig: Take 1 tab daily for 5 days Class: Normal  
 Pharmacy: [facility] Ph #: [phone] Follow-up Instructions Return if symptoms worsen or fail to improve.   
  
To-Do List   
 09/25/2018 4:00 PM  
  Appointment with [address] at [facility] PT [address] ([phone])  
  
 09/27/2018 4:00 PM  
 Appointment with Cassandra Martin, PT at [facility] PT [address] ([phone]) Patient Instructions Laryngitis: Care Instructions Your Care Instructions Laryngitis is an inflammation of the voice box (larynx) that causes your voice to become raspy or hoarse. It can be short-lived or long-lasting. Most of the time, laryngitis comes on quickly and lasts as long as 2 weeks. It is caused by overuse, irritation, or infection of the vocal cords inside the larynx. Some of the most common causes are a cold, the flu, or allergies. Loud talking, shouting, cheering, or singing also can cause laryngitis. Stomach acid that backs up into the throat also can make you lose your voice. Resting your voice and taking other steps at home can help you get your voice back. Follow-up care is a key part of your treatment and safety. Be sure to make and go to all appointments, and call your doctor if you are having problems. It's also a good idea to know your test results and keep a list of the medicines you take. How can you care for yourself at home? · Follow your doctor's directions for treating the condition that caused you to lose your voice. If your doctor prescribed antibiotics, take them as directed. Do not stop taking them just because you feel better. You need to take the full course of antibiotics. · Before you use cough and cold medicines, check the label. They may not be safe for young children or for people with certain health problems. · Try to keep stomach acid from backing up into your throat. Do not eat just before bedtime. Reduce the amount of coffee and alcohol you drink, and eat healthy foods. Taking over-the-counter acid reducers can help when these steps are not enough. In some cases, you may need prescription medicine. · Rest your voice. You do not have to stop speaking, but use your voice as little as possible.  Speak softly but do not whisper; whispering can bother your larynx more than speaking softly. Avoid talking on the telephone or trying to speak loudly. · Try not to clear your throat. This can cause more irritation of your larynx. Take an over-the-counter cough suppressant (if your doctor recommends it) if you have a dry cough that does not produce mucus. · Do not smoke or allow others to smoke around you. If you need help quitting, talk to your doctor about stop-smoking programs and medicines. These can increase your chances of quitting for good. · Use a humidifier or vaporizer to add moisture to your bedroom. Humidity helps to thin the mucus in the nasal membranes that causes stuffiness or postnasal drip. Follow the directions for cleaning the machine. · Drink plenty of fluids, enough so that your urine is light yellow or clear like water. If you have kidney, heart, or liver disease and have to limit fluids, talk with your doctor before you increase the amount of fluids you drink. · Use saline (saltwater) nasal washes to help keep your nasal passages open and wash out mucus and bacteria. You can buy saline nose drops at a grocery store or drugstore. Or, you can make your own at home by mixing ½ teaspoon salt, 1 cup water (at room temperature), and ½ teaspoon baking soda. If you make your own, fill a bulb syringe with the solution, insert the tip into your nostril, and squeeze gently. Deepan Ply your nose. When should you call for help? Call 911 anytime you think you may need emergency care. For example, call if: 
  · You have trouble breathing.  
 Call your doctor now or seek immediate medical care if: 
  · You have new or worse pain.  
  · You have trouble swallowing.  
 Watch closely for changes in your health, and be sure to contact your doctor if: 
  · You do not get better as expected. Where can you learn more? Go to http://betzy-didi.info/. Enter E747 in the search box to learn more about \"Laryngitis: Care Instructions. \" 
 Current as of: May 12, 2017 Content Version: 11.7 © 3451-7492 [employer] Care instructions adapted under license by [employer] (which disclaims liability or warranty for this information). If you have questions about a medical condition or this instruction, always ask your healthcare professional. [address] any warranty or liability for your use of this information. Introducing [facility] [place] [facility]! Jelena Pardo introduces [employer] patient portal. Now you can access parts of your medical record, email your doctor's office, and request medication refills online. 1. In your internet browser, go to [email or web address] 2. Click on the First Time User? Click Here link in the Sign In box. You will see the New Member Sign Up page. 3. Enter your [employer] Access Code exactly as it appears below. You will not need to use this code after youve completed the sign-up process. If you do not sign up before the expiration date, you must request a new code. · [employer] Access Code: [email or web address] Expires: 12/10/2018  4:08 PM 
 
4. Enter the last four digits of your Social Security Number (xxxx) and Date of Birth (mm/dd/yyyy) as indicated and click Submit. You will be taken to the next sign-up page. 5. Create a [employer] ID. This will be your [employer] login ID and cannot be changed, so think of one that is secure and easy to remember. 6. Create a [employer] password. You can change your password at any time. 7. Enter your Password Reset Question and Answer. This can be used at a later time if you forget your password. 8. Enter your e-mail address. You will receive e-mail notification when new information is available in [address] 9. Click Sign Up. You can now view and download portions of your medical record. 10. Click the Download Summary menu link to download a portable copy of your medical information. If you have questions, please visit the Frequently Asked Questions section of the [employer] website. Remember, [employer] is NOT to be used for urgent needs. For medical emergencies, dial 911. Now available from your iPhone and Android! Please provide this summary of care documentation to your next provider. Your primary care clinician is listed as Petty Saunders. If you have any questions after today's visit, please call [phone].

## 2018-09-27 ENCOUNTER — HOSPITAL ENCOUNTER (OUTPATIENT)
Dept: PHYSICAL THERAPY | Age: 58
Discharge: HOME | End: 2018-09-27
Payer: COMMERCIAL

## 2018-09-27 PROCEDURE — 97016 VASOPNEUMATIC DEVICE THERAPY: CPT

## 2018-09-27 PROCEDURE — 97140 MANUAL THERAPY 1/> REGIONS: CPT

## 2018-09-27 PROCEDURE — 97110 THERAPEUTIC EXERCISES: CPT

## 2018-09-27 NOTE — PROGRESS NOTES
PT DAILY TREATMENT NOTE  Patient Name: Raoul Sanchez Date:2018 : 1960 [x]  Patient  Verified Payor: Martin Seaman / Plan: [place] [employer] [place] / Product Type: PPO / In time:4:00  Out time:5:08 Total Treatment Time (min): 68 Total Timed Codes (min): 58 
1:1 Treatment Time (min): 4:00 to 4:45 Visit #: 5 of  Treatment Area: Right knee pain [M25.561] Acute postoperative pain of knee [G89.18, M25.569] SUBJECTIVE Pain Level (0-10 scale): 0 Any medication changes, allergies to medications, adverse drug reactions, diagnosis change, or new procedure performed?: [x] No    [] Yes (see summary sheet for update) Subjective functional status/changes:   [] No changes reported Super stiff. Haven't had PT in 9 days so I think i'm stiff. I've been trying to do HEP OBJECTIVE Modality rationale: decrease edema, decrease inflammation and decrease pain to improve the patients ability to improve gait, stairs Min Type Additional Details Clothes limitation  [x] Estim: []Att   []Unatt        []TENS instruct []IFC  []Premod   [x]NMES 8 min total, 5 sec on 10 off with ECC SAQ lowering 2#   
                 []Other:  []w/US   []w/ice   []w/heat Position: long sitting Location: R quad   
 []  Traction: [] Cervical       []Lumbar 
                     [] Prone          []Supine []Intermittent   []Continuous Lbs: 
[] before manual 
[] after manual  
 []  Ultrasound: []Continuous   [] Pulsed []1MHz   []3MHz Location: 
W/cm2:  
 []  Iontophoresis with dexamethasone Location: [] Take home patch  
[] In clinic  
 []  Ice     []  heat 
[]  Ice massage Position: Location:  
10 [x]  Vasopneumatic Device Pressure:       [] lo [x] med [] hi  
Temperature: [x] lo [] med [] hi  
[x] Skin assessment post-treatment:  [x]intact []redness- no adverse reaction 
     []redness  adverse reaction: 46 (billed 33) min Therapeutic Exercise:  [x] See flow sheet : added leg press for strength Rationale: increase ROM, increase strength and improve coordination to improve the patients ability to improve gait, stairs, mobility 12 min Manual Therapy:  Prone quad stretch, prone hip flexor stretch; EOB knee distraction, grade 3-4 joint mobs for flexion and extension, joint line deep palpation Rationale: decrease pain, increase ROM and increase tissue extensibility to improve gait, mobility, stairs 
       
x min Patient Education: [x] Review HEP    [] Progressed/Changed HEP based on:  
[] positioning   [] body mechanics   [] transfers   [] heat/ice application Other Objective/Functional Measures: AROM before therapy: 106 After MT: 113 Pain Level (0-10 scale) post treatment: 1 ASSESSMENT/Changes in Function: slow progress today with ROM due to not seen in 9 days. Increase in ROm at end of session. Demo's improved gait pattern. Good response to step ups and addition of leg press for strength. Very challenged by L2 on the bike. Patient will continue to benefit from skilled PT services to modify and progress therapeutic interventions, address functional mobility deficits, address ROM deficits, address strength deficits, analyze and address soft tissue restrictions, analyze and cue movement patterns, analyze and modify body mechanics/ergonomics, assess and modify postural abnormalities, address imbalance/dizziness and instruct in home and community integration to attain remaining goals. []  See Plan of Care 
[]  See progress note/recertification 
[]  See Discharge Summary Progress towards goals / Updated goals: 1.  Patient will be (I) with finalized HEP in preparation for D/C.goal progressing - patient reports fair tolerance to HEP after returning to work sec to [employer] [facility] 9/18/18 2.  Patient will demo increased AROM knee flexion to 120 for ease with transfers. Slow progress today due to 9 days between sessions (9/27/18) 3.  Patient will demo AROM knee extension to 0 deg for normalized gait. Goal me t- 0 deg ext 9/18/18 4.  Patient will demo 4/5 knee extension strength for ease with stair negotiation. Progressed with leg press (9/27/18) PLAN [x]  Upgrade activities as tolerated     [x]  Continue plan of care 
[]  Update interventions per flow sheet      
[]  Discharge due to:_ 
[x]  Other:_ Ino Singh, PT 9/27/2018  1:34 PM

## 2018-10-03 ENCOUNTER — HOSPITAL ENCOUNTER (OUTPATIENT)
Dept: PHYSICAL THERAPY | Age: 58
Discharge: HOME | End: 2018-10-03
Payer: COMMERCIAL

## 2018-10-03 PROCEDURE — 97140 MANUAL THERAPY 1/> REGIONS: CPT

## 2018-10-03 PROCEDURE — 97110 THERAPEUTIC EXERCISES: CPT

## 2018-10-03 PROCEDURE — 97016 VASOPNEUMATIC DEVICE THERAPY: CPT

## 2018-10-04 ENCOUNTER — HOSPITAL ENCOUNTER (OUTPATIENT)
Dept: PHYSICAL THERAPY | Age: 58
Discharge: HOME | End: 2018-10-04
Payer: COMMERCIAL

## 2018-10-04 PROCEDURE — 97140 MANUAL THERAPY 1/> REGIONS: CPT | Performed by: PHYSICAL THERAPIST

## 2018-10-04 PROCEDURE — 97110 THERAPEUTIC EXERCISES: CPT | Performed by: PHYSICAL THERAPIST

## 2018-10-04 NOTE — PROGRESS NOTES
PT DAILY TREATMENT NOTE  Patient Name: Ronda Cohn Date:10/4/2018 : 1960 [x]  Patient  Verified Payor: Alvin Delatorre / Plan: [place] [employer] / Product Type: PPO / In time:4:05  Out time:4:55 Total Treatment Time (min):50 Total Timed Codes (min): 35 
1:1 Treatment Time (min): 35 Visit #: 7 of  Treatment Area: Right knee pain [M25.561] Acute postoperative pain of knee [G89.18, M25.569] SUBJECTIVE Pain Level (0-10 scale): 1-2 Any medication changes, allergies to medications, adverse drug reactions, diagnosis change, or new procedure performed?: [x] No    [] Yes (see summary sheet for update) Subjective functional status/changes:   [] No changes reported States that she has no difficulty with exercises. \"I just can;t bend my knee! \" I'm just a little sore from yesterday. \" OBJECTIVE Modality rationale: decrease pain to improve the patients ability to improve gait Min Type Additional Details  
 [] Estim: []Att   []Unatt        []TENS instruct []IFC  []Premod   []NMES []Other:  []w/US   []w/ice   []w/heat Position: Location:  
 []  Traction: [] Cervical       []Lumbar 
                     [] Prone          []Supine []Intermittent   []Continuous Lbs: 
[] before manual 
[] after manual  
 []  Ultrasound: []Continuous   [] Pulsed []1MHz   []3MHz Location: 
W/cm2:  
 []  Iontophoresis with dexamethasone Location: [] Take home patch  
[] In clinic  
 []  Ice     []  heat 
[]  Ice massage Position: Location:  
5 [x]  Vasopneumatic Device Pressure:       [] lo [x] med [] hi  
Temperature: [] lo [x] med [] hi  
[] Skin assessment post-treatment:  []intact []redness- no adverse reaction 
     []redness  adverse reaction:  
 
 
33 min Therapeutic Exercise:  [x] See flow sheet : 20 min 1:1 Rationale: increase ROM, increase strength, improve coordination, improve balance and increase proprioception to improve the patients ability to ambulate community distances. 12 min Manual Therapy:  STM to distal ITB, MFR to distal ITB and distal adductor complex Rationale: increase ROM and increase tissue extensibility to improve gait. with TE min Patient Education: [x] Review HEP    [] Progressed/Changed HEP based on:  
[x] positioning   [x] body mechanics   [] transfers   [] heat/ice application Other Objective/Functional Measures: AROM 105 with c/o moderate  lateral and medial knee pain, AROM post  with c/o mild medial knee pain Pain Level (0-10 scale) post treatment: 0 
 
ASSESSMENT/Changes in Function: Patient not progressed with exercises today due to increased soreness from previous day of therapy. Patient responded well to [address] during MT with report of less pain with knee flexion and increased ROM noted. Patient will continue to benefit from skilled PT services to modify and progress therapeutic interventions, address functional mobility deficits, address ROM deficits, address strength deficits, analyze and address soft tissue restrictions, analyze and cue movement patterns and analyze and modify body mechanics/ergonomics to attain remaining goals. []  See Plan of Care 
[]  See progress note/recertification 
[]  See Discharge Summary Progress towards goals / Updated goals: 
   
Progress towards goals / Updated goals: 1.  Patient will be (I) with finalized HEP in preparation for D/C.goal progressing - patient reports fair tolerance to HEP after returning to work sec to [employer] 9/18/18 2.  Patient will demo increased AROM knee flexion to 120 for ease with transfers. Progress, achieved 110 deg 10/4/18 3.  Patient will demo AROM knee extension to 0 deg for normalized gait. Goal me t- 0 deg ext 9/18/18 4.  Patient will demo 4/5 knee extension strength for ease with stair negotiation.   Progressed with leg press (9/27/18) 
  
 
PLAN 
 [x]  Upgrade activities as tolerated     []  Continue plan of care 
[]  Update interventions per flow sheet      
[]  Discharge due to:_ 
[]  Other:_ Carmel Gomes DPT 10/4/2018  7:48 PM

## 2018-10-09 ENCOUNTER — APPOINTMENT (OUTPATIENT)
Dept: PHYSICAL THERAPY | Age: 58
End: 2018-10-09
Payer: COMMERCIAL

## 2018-10-11 ENCOUNTER — HOSPITAL ENCOUNTER (OUTPATIENT)
Dept: PHYSICAL THERAPY | Age: 58
Discharge: HOME | End: 2018-10-11
Payer: COMMERCIAL

## 2018-10-11 PROCEDURE — 97016 VASOPNEUMATIC DEVICE THERAPY: CPT

## 2018-10-11 PROCEDURE — 97110 THERAPEUTIC EXERCISES: CPT

## 2018-10-11 PROCEDURE — 97140 MANUAL THERAPY 1/> REGIONS: CPT

## 2018-10-11 NOTE — PROGRESS NOTES
PT DAILY TREATMENT NOTE  Patient Name: Antonia Albetrs Date:10/11/2018 : 1960 [x]  Patient  Verified Payor: Rito Galindo / Plan: [employer] / Product Type: PPO / In time:3:40  Out time:4:41 Total Treatment Time (min): 61 Total Timed Codes (min): 51 
1:1 Treatment Time (min): 3:40 to 4:12 (32) Visit #: 8 of  Treatment Area: Right knee pain [M25.561] Acute postoperative pain of knee [G89.18, M25.569] SUBJECTIVE Pain Level (0-10 scale): 1 Any medication changes, allergies to medications, adverse drug reactions, diagnosis change, or new procedure performed?: [x] No    [] Yes (see summary sheet for update) Subjective functional status/changes:   [] No changes reported 2/10 ave pain Pain ranges: 0-2/10 
100% improvement Improvements: going to the park, using bike, doing HEP, staying moving, working/walking 7 hours, all household tasks Deficits: deep squats for scrubbing bathrooms (has been limited since pre L TKR in 2016); running, putting pressure on knees in deep squat/prayer pose, min antalgic gait mid-day, min catch in the knee intermittently Pt goals: get stronger than I am, get rid of the generalized soreness at the end of the day . OBJECTIVE Modality rationale: decrease edema, decrease inflammation and decrease pain to improve the patients ability to improve gait, stairs, ambulation tolerance Min Type Additional Details 10 [x]  Vasopneumatic Device Pressure:       [] lo [x] med [] hi  
Temperature: [x] lo [] med [] hi  
[x] Skin assessment post-treatment:  [x]intact []redness- no adverse reaction 
     []redness  adverse reaction:  
 
 
36 (17) min Therapeutic Exercise:  [x] See flow sheet : REASSESSMENT, progressed  Bridges to a march , increased leg press. Rationale: increase ROM, increase strength, improve coordination and improve balance to improve the patients ability to improve gait, mobility 15 min Manual Therapy:  STM to distal ITB, MFR to distal ITB and distal adductor complex, patellar mobs, PROm flexion with posterior tibial mobs Rationale: decrease pain, increase ROM and increase tissue extensibility to improve gait, mobility  
       
x min Patient Education: [x] Review HEP    [] Progressed/Changed HEP based on:  
[] positioning   [] body mechanics   [] transfers   [] heat/ice application Other Objective/Functional Measures: FOTO 66/100 R knee AROM: 1 to 0 to 110 PROM 1 to 0 to 115 Hip MMT: 5/5 Knee MMT knee ext 4+/5, 4+/5 HS Ankle MMT 5/5 Edema: midpatella: 38cm, L 37cm, joint line: 35.5, L 34cm Gait: normalized on level surfaces Mini band: pain on medial knee noted with hip IR/ER with band, D/C that one but able to complete all squats forward/side and monster walks with straight legs. Pain Level (0-10 scale) post treatment: 0 
 
ASSESSMENT/Changes in Function: see PN . Challenged by progression in strength today. Patient will continue to benefit from skilled PT services to modify and progress therapeutic interventions, address functional mobility deficits, address ROM deficits, address strength deficits, analyze and address soft tissue restrictions, analyze and cue movement patterns, analyze and modify body mechanics/ergonomics, assess and modify postural abnormalities, address imbalance/dizziness and instruct in home and community integration to attain remaining goals. []  See Plan of Care [x]  See progress note/recertification 
[]  See Discharge Summary Progress towards goals / Updated goals: 1.  Patient will be (I) with finalized HEP in preparation for D/C.goal progressing -pt notes improved compliance (10/11/18) 2.  Patient will demo increased AROM knee flexion to 120 for ease with transfers. Progress, AROM 110 and PROM 115 (10/11/18) 3.  Patient will demo AROM knee extension to 0 deg for normalized gait. Goal met- 1 degree hyperextension (10/11/18) 4.  Patient will demo 4/5 knee extension strength for ease with stair negotiation.  Progressed with leg press (9/27/18) PLAN [x]  Upgrade activities as tolerated     []  Continue plan of care 
[]  Update interventions per flow sheet      
[]  Discharge due to:_ 
[x]  Other:_  1x/week for 4 sessions Lisandro Median, PT 10/11/2018  3:33 PM

## 2018-10-11 NOTE — PROGRESS NOTES
[address] [facility] [address] [facility] AT [address]. [address] Phone: [phone] Fax: [phone] PROGRESS NOTE Patient Name: Jordy Ag : 1960 Treatment/Medical Diagnosis: Right knee pain [M25.561] Acute postoperative pain of knee [G89.18, M25.569] Referral Source: Batool Arenas MD    
Date of Initial Visit: 18 Attended Visits: 17 Missed Visits: 5 SUMMARY OF TREATMENTPt is a 62y.o. year old female who presents with sp R TKR DOS 18. Treatment has consisted of the following: Therapeutic exercise, Therapeutic activities, Physical agent/modality, Gait/balance training, Manual therapy, Patient education, Self Care training, Functional mobility training, Home safety training and Stair training. CURRENT STATUSPatient is making fair progresess in therapy but con't to struggle with gains in ROM. She notes ave pain 2/10, pain ranges from 0-2/10. Pt rates 100% improvement. Score on the FOTO has improved from 33/100 at IE to 66/100. 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 FOTO 66/100 R knee AROM: 1 to 0 to 110 PROM 1 to 0 to 115 Hip MMT: 5/5 Knee MMT knee ext 4+/5, 4+/5 HS Ankle MMT 5/5 Edema: midpatella: 38cm, L 37cm, joint line: 35.5, L 34cm Gait: normalized on level surfaces Goal/Measure of Progress 1.  Patient will be (I) with finalized HEP in preparation for D/C.goal progressing -pt notes improved compliance (10/11/18) 2.  Patient will demo increased AROM knee flexion to 120 for ease with transfers. Progress, AROM 110 and PROM 115 (10/11/18) 3.  Patient will demo AROM knee extension to 0 deg for normalized gait. Goal met- 1 degree hyperextension (10/11/18) 4.  Patient will demo 4/5 knee extension strength for ease with stair negotiation.  Goal met at 4+/5 MMT (10/11/18) New/Cont'd Goals to be achieved in __4_  treatments: 1. Patient will be (I) with finalized HEP in preparation for D/C. 2.Patient will demo increased AROM knee flexion to 120 for ease with transfers. 3. Pt will have 5/5 strength in R quad and HS to improve prognosis for return to running and to PLOF 4. Pt will be able to perform 10x lateral step downs on 6\" with good form and no c/o pain to progress to return to running (with MD approval) or fast walking RECOMMENDATIONS Recommend con't with PT 1x/week for 4 sessions. If you have any questions/comments please contact us directly at [phone]. Thank you for allowing us to assist in the care of your patient. PT Signature: Alexa Baker DPT Date: 10/11/2018 Time: 4:52pm  
NOTE TO PHYSICIAN:  PLEASE COMPLETE THE ORDERS BELOW AND FAX TO [facility]: [phone]. If you are unable to process this request in 24 hours please contact our office: [phone]. 
___ I have read the above report and request that my patient continue as recommended.  
___ I have read the above report and request that my patient continue therapy with the following changes/special instructions:_________________________________________________________  
___ I have read the above report and request that my patient be discharged from therapy.   
 
Physician Signature:       Date:      Time:

## 2018-10-23 ENCOUNTER — HOSPITAL ENCOUNTER (OUTPATIENT)
Dept: PHYSICAL THERAPY | Age: 58
End: 2018-10-23
Payer: COMMERCIAL

## 2018-10-24 ENCOUNTER — APPOINTMENT (OUTPATIENT)
Dept: PHYSICAL THERAPY | Age: 58
End: 2018-10-24
Payer: COMMERCIAL

## 2018-10-29 ENCOUNTER — HOSPITAL ENCOUNTER (OUTPATIENT)
Dept: PHYSICAL THERAPY | Age: 58
End: 2018-10-29
Payer: COMMERCIAL

## 2018-11-01 ENCOUNTER — APPOINTMENT (OUTPATIENT)
Dept: PHYSICAL THERAPY | Age: 58
End: 2018-11-01

## 2018-11-06 ENCOUNTER — APPOINTMENT (OUTPATIENT)
Dept: PHYSICAL THERAPY | Age: 58
End: 2018-11-06

## 2018-11-06 ENCOUNTER — OFFICE VISIT (OUTPATIENT)
Dept: FAMILY MEDICINE CLINIC | Age: 58
End: 2018-11-06

## 2018-11-06 VITALS
OXYGEN SATURATION: 98 % | HEIGHT: 64 IN | BODY MASS INDEX: 27.14 KG/M2 | SYSTOLIC BLOOD PRESSURE: 144 MMHG | TEMPERATURE: 97.4 F | DIASTOLIC BLOOD PRESSURE: 93 MMHG | HEART RATE: 85 BPM | WEIGHT: 159 LBS | RESPIRATION RATE: 18 BRPM

## 2018-11-06 DIAGNOSIS — R11.2 NON-INTRACTABLE VOMITING WITH NAUSEA, UNSPECIFIED VOMITING TYPE: ICD-10-CM

## 2018-11-06 DIAGNOSIS — T38.0X5A STEROID-INDUCED DIABETES (HCC): ICD-10-CM

## 2018-11-06 DIAGNOSIS — M05.742 RHEUMATOID ARTHRITIS INVOLVING BOTH HANDS WITH POSITIVE RHEUMATOID FACTOR (HCC): Primary | ICD-10-CM

## 2018-11-06 DIAGNOSIS — I10 ESSENTIAL HYPERTENSION: ICD-10-CM

## 2018-11-06 DIAGNOSIS — M05.741 RHEUMATOID ARTHRITIS INVOLVING BOTH HANDS WITH POSITIVE RHEUMATOID FACTOR (HCC): Primary | ICD-10-CM

## 2018-11-06 DIAGNOSIS — E09.9 STEROID-INDUCED DIABETES (HCC): ICD-10-CM

## 2018-11-06 RX ORDER — CARBIDOPA AND LEVODOPA 25; 100 MG/1; MG/1
TABLET ORAL
COMMUNITY
Start: 2018-10-24 | End: 2018-12-26

## 2018-11-06 RX ORDER — SULFASALAZINE 500 MG/1
500 TABLET ORAL 2 TIMES DAILY
COMMUNITY
Start: 2018-11-01 | End: 2020-04-22

## 2018-11-06 RX ORDER — HYDROCODONE BITARTRATE AND ACETAMINOPHEN 5; 325 MG/1; MG/1
TABLET ORAL
Refills: 0 | COMMUNITY
Start: 2018-10-23 | End: 2018-11-06 | Stop reason: SDUPTHER

## 2018-11-06 RX ORDER — METFORMIN HYDROCHLORIDE 500 MG/1
TABLET, FILM COATED, EXTENDED RELEASE ORAL
Qty: 60 TAB | Refills: 2 | Status: SHIPPED | OUTPATIENT
Start: 2018-11-06 | End: 2019-05-28 | Stop reason: SDUPTHER

## 2018-11-06 RX ORDER — ONDANSETRON 4 MG/1
4 TABLET, ORALLY DISINTEGRATING ORAL
Qty: 30 TAB | Refills: 0 | Status: SHIPPED | OUTPATIENT
Start: 2018-11-06 | End: 2019-03-15 | Stop reason: SDUPTHER

## 2018-11-06 RX ORDER — HYDROCODONE BITARTRATE AND ACETAMINOPHEN 5; 325 MG/1; MG/1
TABLET ORAL
Qty: 20 TAB | Refills: 0 | Status: SHIPPED | OUTPATIENT
Start: 2018-11-06 | End: 2018-11-20 | Stop reason: SDUPTHER

## 2018-11-06 NOTE — PROGRESS NOTES
Miah Masterson is a 62 y.o.  female and presents with    Chief Complaint   Patient presents with    Diabetes    Form Completion     Subjective:  Mrs. Vic Jon presents c/o joint pain and has appointment scheduled with rheumatologist dr. Gerard Motta. She is currently taking prednisone 10 mg daily and has elevated glucose to 458. She was admitted and treated for the joint pain. She has had swelling in her hands which has improved with prednisone. She has nausea and vomiting; she has been taking zofran    Hypertension  Patient is here for follow-up of hypertension.  She indicates that she is feeling well and denies any symptoms referable to her hypertension. She is exercising and is adherent to low salt diet.  Blood pressure is well controlled at home. Use of agents associated with hypertension: none. Patient Active Problem List   Diagnosis Code    Osteoarthritis of left knee M17.12    Hypertension I10    Hyperlipidemia E78.5    GERD (gastroesophageal reflux disease) K21.9    Asthma J45.909    Type 2 diabetes mellitus ([address] E11.9    SBO (small bowel obstruction) ([facility]) K56.609    Chronic abdominal pain R10.9, G89.29    Post-operative pain G89.18    Chest pain R07.9    Essential hypertension, benign I10    Sural neuritis G57.80    Type 2 diabetes mellitus with nephropathy ([facility]) E11.21    Non-intractable cyclical vomiting with nausea G43. A0    Bowel obstruction ([address] K56.609     Patient Active Problem List    Diagnosis Date Noted    Bowel obstruction ([address] 08/23/2018    Non-intractable cyclical vomiting with nausea 01/24/2018    Type 2 diabetes mellitus with nephropathy ([address] 01/19/2018    Chronic abdominal pain 03/14/2017    Post-operative pain 03/14/2017    SBO (small bowel obstruction) ([address] 02/21/2017    Osteoarthritis of left knee 06/27/2016    Hypertension 06/27/2016    Hyperlipidemia 06/27/2016    GERD (gastroesophageal reflux disease) 06/27/2016    Asthma 06/27/2016    Type 2 diabetes mellitus ([facility] 06/27/2016    Sural neuritis 06/23/2014    Chest pain 04/20/2014    Essential hypertension, benign 02/27/2012     Current Outpatient Medications   Medication Sig Dispense Refill    acetaminophen-codeine (TYLENOL #3) 300-30 mg per tablet Take 1 Tab by mouth every four (4) hours as needed for Pain. Max Daily Amount: 6 Tabs. 12 Tab 0    prednisoLONE 5 mg tab Take 1 tab daily for 5 days 5 Tab 0    ibuprofen (MOTRIN) 800 mg tablet Take 1 Tab by mouth every eight (8) hours as needed for Pain. 30 Tab 1    traMADol (ULTRAM) 50 mg tablet Take 2 Tabs by mouth every six (6) hours as needed for Pain. Max Daily Amount: 400 mg. 60 Tab 0    metFORMIN (GLUMETZA ER) 500 mg TG24 24 hour tablet take 1 tablet by mouth twice a day 60 Tab 2    magnesium citrate solution Take 1/3 of bottle every 8 hours until finished or until you have a bowel movement. 1 Bottle 0    ondansetron (ZOFRAN ODT) 4 mg disintegrating tablet Take 1 Tab by mouth every eight (8) hours as needed for Nausea. 12 Tab 0    polyethylene glycol (MIRALAX) 17 gram/dose powder Take 17 g by mouth two (2) times a day. 1 capful with 8 oz of water daily 119 g 0    senna (SENOKOT) 8.6 mg tablet Take 1 Tab by mouth daily. 30 Tab 0    amLODIPine (NORVASC) 2.5 mg tablet take 1 tablet by mouth NIGHTLY 90 Tab 1    ALLERGY RELIEF, CETIRIZINE, 10 mg tablet take 1 tablet by mouth once daily  0    doxepin (SINEQUAN) 10 mg capsule Take 1 Cap by mouth nightly. 30 Cap 11    dapagliflozin (FARXIGA) 10 mg tab tablet Take 1 Tab by mouth daily. Indications: type 2 diabetes mellitus 90 Tab 3    omeprazole (PRILOSEC) 10 mg capsule Take 1 Cap by mouth daily. 30 Cap 11    albuterol (PROVENTIL HFA, VENTOLIN HFA, PROAIR HFA) 90 mcg/actuation inhaler Take 1 Puff by inhalation every six (6) hours as needed for Wheezing. 1 Inhaler 0    atorvastatin (LIPITOR) 40 mg tablet Take 1 Tab by mouth nightly.  90 Tab 3    glucose blood VI test strips (ONETOUCH ULTRA TEST) strip Check blood glucose as directed 100 Strip 1    bisoprolol-hydroCHLOROthiazide (ZIAC) 2.5-6.25 mg per tablet Take 1 Tab by mouth daily.  90 Tab 3     Allergies   Allergen Reactions    Macrodantin [Nitrofurantoin Macrocrystalline] Itching and Other (comments)    Tape [Adhesive] Other (comments)     Paper tape-- feels like burning skin  Burned skin when had wound vac     Past Medical History:   Diagnosis Date    Abdominal adhesions     Anemia     Arthritis     all over the body    Asthma     Cocaine abuse [facility]     Diabetes mellitus     Dyskinesia     bilateral    Essential hypertension     GERD (gastroesophageal reflux disease)     Hypertension     Menopause     Microhematuria     Stool color black      Past Surgical History:   Procedure Laterality Date    HX CHOLECYSTECTOMY  6/28/10    HX COLONOSCOPY      HX ENDOSCOPY      HX HERNIA REPAIR      HX HYSTERECTOMY      Fibroids    HX KNEE REPLACEMENT Left     HX OOPHORECTOMY  12/2000    HX ORTHOPAEDIC Right     ankle- multiple surgeries    HX SMALL BOWEL RESECTION  12/2000    HX SMALL BOWEL RESECTION  02/21/2017    Dr. Pa Bauer       Family History   Problem Relation Age of Onset    Hypertension Other         parent    Breast Cancer Other 21    Heart Disease Father     Hypertension Father     Diabetes Father     Diabetes Mother     Hypertension Other         sibling    Diabetes Other         parent    Diabetes Sister     Kidney Disease Brother     Diabetes Brother      Social History     Tobacco Use    Smoking status: Never Smoker    Smokeless tobacco: Never Used   Substance Use Topics    Alcohol use: No       ROS   General ROS: negative for - chills or fever  Psychological ROS: negative for - anxiety or depression  Ophthalmic ROS: positive for - uses glasses  ENT ROS: negative for - headaches  Endocrine ROS: positive for - polydipsia/polyuria  Respiratory ROS: no cough, shortness of breath, or wheezing  Cardiovascular ROS: no chest pain or dyspnea on exertion  Gastrointestinal ROS: positive for - abdominal pain  negative for - constipation or heartburn  Genito-Urinary ROS: no dysuria, trouble voiding, or hematuria  Neurological ROS: no TIA or stroke symptoms  Dermatological ROS: negative for - rash or skin lesion changes    All other systems reviewed and are negative. Objective:  Vitals:    11/06/18 1110   BP: (!) 144/93   Pulse: 85   Resp: 18   Temp: 97.4 °F (36.3 °C)   TempSrc: Oral   SpO2: 98%   Weight: 159 lb (72.1 kg)   Height: 5' 4\" (1.626 m)   PainSc:   8   PainLoc: Abdomen       Constitutional: She is oriented to person, place, and time. She appears well-developed and well-nourished. She appears calm   HENT:   Head: Normocephalic and atraumatic. Mouth/Throat: Oropharynx is clear and moist.   Eyes: conjunctival erythema  Neck: Neck supple. No tracheal deviation present. Cardiovascular: Regular rhythm.    No murmur heard. Pulmonary/Chest: Effort normal and breath sounds normal. No respiratory distress. Abdominal: Soft. No tenderness  Musculoskeletal: right knee with pain with motion  Neurological: antalgic gait; No gross neuro deficit       Result Impression       1. Mild abnormal dilatation of a loop of jejunum, 3.2 cm, air-filled. C-loop of the duodenum is upper limits of normal size and fluid filled. Both are larger when compared with 2 days previous. No obstructing lesion is identified and this dilated intestine is nonspecific  -Could be an ileus or an early or partial small intestinal obstruction  2. Stable stranding and nodularity of the anterior abdominal wall compared with 2 days previous. The nodularity is new when compared with 10/23/2018, very probably benign, perhaps related to injections.  -The stranding of the anterior, wall is stable and therefore, low concern on this exam for infection  3. Colonic diverticulosis  4.  Left adnexal region cystic lesion with minimal limited wall thickening which could be residual nonpathologic ovarian tissue. It is 4 cm, quite minimally increased compared 2015. Assessment/Plan:    1. Rheumatoid arthritis involving both hands with positive rheumatoid factor (HCC)  Pain management  - HYDROcodone-acetaminophen (NORCO) 5-325 mg per tablet; take 1 tablet by mouth every 4 hours if needed  Dispense: 20 Tab; Refill: 0    2. Non-intractable vomiting with nausea, unspecified vomiting type  Continue antiemetic  - ondansetron (ZOFRAN ODT) 4 mg disintegrating tablet; Take 1 Tab by mouth every eight (8) hours as needed for Nausea. Dispense: 30 Tab; Refill: 0    3. Essential hypertension  Goal <130/80; borderline controlled; encourage low salt diet and exercise as tolerated    4. Steroid-induced diabetes ([facility]  Start glucophage to decrease glucose  - metFORMIN (GLUMETZA ER) 500 mg TG24 24 hour tablet; take 1 tablet by mouth twice a day  Dispense: 60 Tab; Refill: 2      Lab review: labs reviewed, I note that glycosylated hemoglobin abnormal      I have discussed the diagnosis with the patient and the intended plan as seen in the above orders. The patient has received an after-visit summary and questions were answered concerning future plans. I have discussed medication side effects and warnings with the patient as well. I have reviewed the plan of care with the patient, accepted their input and they are in agreement with the treatment goals. Follow-up Disposition:  Return in about 2 weeks (around 11/20/2018) for medication evaluation.

## 2018-11-06 NOTE — PATIENT INSTRUCTIONS
Nausea and Vomiting: Care Instructions  Your Care Instructions    When you are nauseated, you may feel weak and sweaty and notice a lot of saliva in your mouth. Nausea often leads to vomiting. Most of the time you do not need to worry about nausea and vomiting, but they can be signs of other illnesses. Two common causes of nausea and vomiting are stomach flu and food poisoning. Nausea and vomiting from viral stomach flu will usually start to improve within 24 hours. Nausea and vomiting from food poisoning may last from 12 to 48 hours. The doctor has checked you carefully, but problems can develop later. If you notice any problems or new symptoms, get medical treatment right away. Follow-up care is a key part of your treatment and safety. Be sure to make and go to all appointments, and call your doctor if you are having problems. It's also a good idea to know your test results and keep a list of the medicines you take. How can you care for yourself at home? · To prevent dehydration, drink plenty of fluids, enough so that your urine is light yellow or clear like water. Choose water and other caffeine-free clear liquids until you feel better. If you have kidney, heart, or liver disease and have to limit fluids, talk with your doctor before you increase the amount of fluids you drink. · Rest in bed until you feel better. · When you are able to eat, try clear soups, mild foods, and liquids until all symptoms are gone for 12 to 48 hours. Other good choices include dry toast, crackers, cooked cereal, and gelatin dessert, such as Jell-O. When should you call for help? Call 911 anytime you think you may need emergency care. For example, call if:    · You passed out (lost consciousness).    Call your doctor now or seek immediate medical care if:    · You have symptoms of dehydration, such as:  ? Dry eyes and a dry mouth. ? Passing only a little dark urine. ?  Feeling thirstier than usual.     · You have new or worsening belly pain.     · You have a new or higher fever.     · You vomit blood or what looks like coffee grounds.    Watch closely for changes in your health, and be sure to contact your doctor if:    · You have ongoing nausea and vomiting.     · Your vomiting is getting worse.     · Your vomiting lasts longer than 2 days.     · You are not getting better as expected. Where can you learn more? Go to http://betzy-didi.info/. Enter 25 [id number] in the search box to learn more about \"Nausea and Vomiting: Care Instructions. \"  Current as of: November 20, 2017  Content Version: 11.8  © 2509-5692 [employer]. Care instructions adapted under license by [employer] (which disclaims liability or warranty for this information). If you have questions about a medical condition or this instruction, always ask your healthcare professional. [address] any warranty or liability for your use of this information. Nutrition Tips for Diabetes: After Your Visit  Your Care Instructions  A healthy diet is important to manage diabetes. It helps you lose weight (if you need to) and keep it off. It gives you the nutrition and energy your body needs and helps prevent heart disease. But a diet for diabetes does not mean that you have to eat special foods. You can eat what your family eats, including occasional sweets and other favorites. But you do have to pay attention to how often you eat and how much you eat of certain foods. The right plan for you will give you meals that help you keep your blood sugar at healthy levels. Try to eat a variety of foods and to spread carbohydrate throughout the day. Carbohydrate raises blood sugar higher and more quickly than any other nutrient does. Carbohydrate is found in sugar, breads and cereals, fruit, starchy vegetables such as potatoes and corn, and milk and yogurt.   You may want to work with a dietitian or diabetes educator to help you plan meals and snacks. A dietitian or diabetes educator also can help you lose weight if that is one of your goals. The following tips can help you enjoy your meals and stay healthy. Follow-up care is a key part of your treatment and safety. Be sure to make and go to all appointments, and call your doctor if you are having problems. Its also a good idea to know your test results and keep a list of the medicines you take. How can you care for yourself at home? · Learn which foods have carbohydrate and how much carbohydrate to eat. A dietitian or diabetes educator can help you learn to keep track of how much carbohydrate you eat. · Spread carbohydrate throughout the day. Eat some carbohydrate at all meals, but do not eat too much at any one time. · Plan meals to include food from all the food groups. These are the food groups and some example portion sizes:  ¨ Grains: 1 slice of bread (1 ounce), ½ cup of cooked cereal, and 1/3 cup of cooked pasta or rice. These have about 15 grams of carbohydrate in a serving. Choose whole grains such as whole wheat bread or crackers, oatmeal, and brown rice more often than refined grains. ¨ Fruit: 1 small fresh fruit, such as an apple or orange; ½ of a banana; ½ cup of chopped, cooked, or canned fruit; ½ cup of fruit juice; 1 cup of melon or raspberries; and 2 tablespoons of dried fruit. These have about 15 grams of carbohydrate in a serving. ¨ Dairy: 1 cup of nonfat or low-fat milk and 2/3 cup of plain yogurt. These have about 15 grams of carbohydrate in a serving. ¨ Protein foods: Beef, chicken, turkey, fish, eggs, tofu, cheese, cottage cheese, and peanut butter. A serving size of meat is 3 ounces, which is about the size of a deck of cards. Examples of meat substitute serving sizes (equal to 1 ounce of meat) are 1/4 cup of cottage cheese, 1 egg, 1 tablespoon of peanut butter, and ½ cup of tofu. These have very little or no carbohydrate per serving.   ¨ Vegetables: Starchy vegetables such as ½ cup of cooked dried beans, peas, potatoes, or corn have about 15 grams of carbohydrate. Nonstarchy vegetables have very little carbohydrate, such as 1 cup of raw leafy vegetables (such as spinach), ½ cup of other vegetables (cooked or chopped), and 3/4 cup of vegetable juice. · Use the plate format to plan meals. It is a good, quick way to make sure that you have a balanced meal. It also helps you spread carbohydrate throughout the day. You divide your plate by types of foods. Put vegetables on half the plate, meat or meat substitutes on one-quarter of the plate, and a grain or starchy vegetable (such as brown rice or a potato) in the final quarter of the plate. To this you can add a small piece of fruit and 1 cup of milk or yogurt, depending on how much carbohydrate you are supposed to eat at a meal.  · Talk to your dietitian or diabetes educator about ways to add limited amounts of sweets into your meal plan. You can eat these foods now and then, as long as you include the amount of carbohydrate they have in your daily carbohydrate allowance. · If you drink alcohol, limit it to no more than 1 drink a day for women and 2 drinks a day for men. If you are pregnant, no amount of alcohol is known to be safe. · Protein, fat, and fiber do not raise blood sugar as much as carbohydrate does. If you eat a lot of these nutrients in a meal, your blood sugar will rise more slowly than it would otherwise. · Limit saturated fats, such as those from meat and dairy products. Try to replace it with monounsaturated fat, such as olive oil. This is a healthier choice because people who have diabetes are at higher-than-average risk of heart disease. But use a modest amount of olive oil. A tablespoon of olive oil has 14 grams of fat and 120 calories. · Exercise lowers blood sugar. If you take insulin by shots or pump, you can use less than you would if you were not exercising.  Keep in mind that timing matters. If you exercise within 1 hour after a meal, your body may need less insulin for that meal than it would if you exercised 3 hours after the meal. Test your blood sugar to find out how exercise affects your need for insulin. · Exercise on most days of the week. Aim for at least 30 minutes. Exercise helps you stay at a healthy weight and helps your body use insulin. Walking is an easy way to get exercise. Gradually increase the amount you walk every day. You also may want to swim, bike, or do other activities. When you eat out  · Learn to estimate the serving sizes of foods that have carbohydrate. If you measure food at home, it will be easier to estimate the amount in a serving of restaurant food. · If the meal you order has too much carbohydrate (such as potatoes, corn, or baked beans), ask to have a low-carbohydrate food instead. Ask for a salad or green vegetables. · If you use insulin, check your blood sugar before and after eating out to help you plan how much to eat in the future. · If you eat more carbohydrate at a meal than you had planned, take a walk or do other exercise. This will help lower your blood sugar. Where can you learn more? Go to Anevia.be  Enter A362 in the search box to learn more about \"Nutrition Tips for Diabetes: After Your Visit. \"   © 2468-3898 [employer]. Care instructions adapted under license by [facility] (which disclaims liability or warranty for this information). This care instruction is for use with your licensed healthcare professional. If you have questions about a medical condition or this instruction, always ask your healthcare professional. [place] [address] any warranty or liability for your use of this information.   Content Version: 59.8.065031; Current as of: June 4, 2014

## 2018-11-06 NOTE — PROGRESS NOTES
1. Have you been to the ER, urgent care clinic since your last visit? Hospitalized since your last visit? Admitted to [facility]      2. Have you seen or consulted any other health care providers outside of the [address] [place] [address] since your last visit? Include any pap smears or colon screening.  No

## 2018-11-06 NOTE — LETTER
NOTIFICATION RETURN TO WORK  
 
11/6/2018 11:34 AM 
 
Ms. Tonio Villeda [address] [facility] [phone] To Whom It May Concern: 
 
Tonio Villeda is currently under the care of [address] [place] [address]. She will return to work on: 11/12/2018 If there are questions or concerns please have the patient contact our office. Sincerely, Naty Frausto MD

## 2018-11-07 ENCOUNTER — HOSPITAL ENCOUNTER (OUTPATIENT)
Dept: SLEEP MEDICINE | Age: 58
Discharge: HOME | End: 2018-11-07
Payer: COMMERCIAL

## 2018-11-07 DIAGNOSIS — K21.9 GASTRO-ESOPHAGEAL REFLUX DISEASE WITHOUT ESOPHAGITIS: ICD-10-CM

## 2018-11-07 DIAGNOSIS — E11.9 TYPE II DIABETES MELLITUS (HCC): ICD-10-CM

## 2018-11-07 DIAGNOSIS — E78.5 HYPERLIPIDEMIA: ICD-10-CM

## 2018-11-07 DIAGNOSIS — J45.909 ASTHMA: ICD-10-CM

## 2018-11-07 DIAGNOSIS — G47.33 OSA (OBSTRUCTIVE SLEEP APNEA): ICD-10-CM

## 2018-11-07 PROCEDURE — 95810 POLYSOM 6/> YRS 4/> PARAM: CPT

## 2018-11-08 VITALS
HEART RATE: 86 BPM | DIASTOLIC BLOOD PRESSURE: 87 MMHG | WEIGHT: 161 LBS | SYSTOLIC BLOOD PRESSURE: 134 MMHG | HEIGHT: 64 IN | BODY MASS INDEX: 27.49 KG/M2

## 2018-11-13 ENCOUNTER — APPOINTMENT (OUTPATIENT)
Dept: PHYSICAL THERAPY | Age: 58
End: 2018-11-13

## 2018-11-15 ENCOUNTER — HOSPITAL ENCOUNTER (EMERGENCY)
Age: 58
Discharge: HOME | End: 2018-11-15
Attending: EMERGENCY MEDICINE
Payer: COMMERCIAL

## 2018-11-15 VITALS
SYSTOLIC BLOOD PRESSURE: 110 MMHG | TEMPERATURE: 98.2 F | OXYGEN SATURATION: 98 % | HEIGHT: 64 IN | RESPIRATION RATE: 17 BRPM | DIASTOLIC BLOOD PRESSURE: 67 MMHG | WEIGHT: 162 LBS | HEART RATE: 103 BPM | BODY MASS INDEX: 27.66 KG/M2

## 2018-11-15 DIAGNOSIS — R10.13 RECURRENT EPIGASTRIC ABDOMINAL PAIN: Primary | ICD-10-CM

## 2018-11-15 DIAGNOSIS — R11.2 NON-INTRACTABLE VOMITING WITH NAUSEA, UNSPECIFIED VOMITING TYPE: ICD-10-CM

## 2018-11-15 LAB
ALBUMIN SERPL-MCNC: 3.8 G/DL (ref 3.4–5)
ALBUMIN/GLOB SERPL: 1.1 {RATIO} (ref 0.8–1.7)
ALP SERPL-CCNC: 124 U/L (ref 45–117)
ALT SERPL-CCNC: 21 U/L (ref 13–56)
ANION GAP SERPL CALC-SCNC: 9 MMOL/L (ref 3–18)
APPEARANCE UR: CLEAR
AST SERPL-CCNC: 9 U/L (ref 15–37)
BASOPHILS # BLD: 0 K/UL (ref 0–0.1)
BASOPHILS NFR BLD: 0 % (ref 0–2)
BILIRUB SERPL-MCNC: 0.3 MG/DL (ref 0.2–1)
BILIRUB UR QL: NEGATIVE
BUN SERPL-MCNC: 26 MG/DL (ref 7–18)
BUN/CREAT SERPL: 22 (ref 12–20)
CALCIUM SERPL-MCNC: 9.7 MG/DL (ref 8.5–10.1)
CHLORIDE SERPL-SCNC: 105 MMOL/L (ref 100–108)
CO2 SERPL-SCNC: 26 MMOL/L (ref 21–32)
COLOR UR: YELLOW
CREAT SERPL-MCNC: 1.19 MG/DL (ref 0.6–1.3)
DIFFERENTIAL METHOD BLD: ABNORMAL
EOSINOPHIL # BLD: 0 K/UL (ref 0–0.4)
EOSINOPHIL NFR BLD: 0 % (ref 0–5)
ERYTHROCYTE [DISTWIDTH] IN BLOOD BY AUTOMATED COUNT: 15.4 % (ref 11.6–14.5)
GLOBULIN SER CALC-MCNC: 3.5 G/DL (ref 2–4)
GLUCOSE SERPL-MCNC: 119 MG/DL (ref 74–99)
GLUCOSE UR STRIP.AUTO-MCNC: >1000 MG/DL
HCT VFR BLD AUTO: 41.1 % (ref 35–45)
HGB BLD-MCNC: 13.3 G/DL (ref 12–16)
HGB UR QL STRIP: NEGATIVE
KETONES UR QL STRIP.AUTO: NEGATIVE MG/DL
LEUKOCYTE ESTERASE UR QL STRIP.AUTO: NEGATIVE
LIPASE SERPL-CCNC: 267 U/L (ref 73–393)
LYMPHOCYTES # BLD: 1.8 K/UL (ref 0.9–3.6)
LYMPHOCYTES NFR BLD: 18 % (ref 21–52)
MCH RBC QN AUTO: 28.1 PG (ref 24–34)
MCHC RBC AUTO-ENTMCNC: 32.4 G/DL (ref 31–37)
MCV RBC AUTO: 86.9 FL (ref 74–97)
MONOCYTES # BLD: 0.7 K/UL (ref 0.05–1.2)
MONOCYTES NFR BLD: 7 % (ref 3–10)
NEUTS SEG # BLD: 7.6 K/UL (ref 1.8–8)
NEUTS SEG NFR BLD: 75 % (ref 40–73)
NITRITE UR QL STRIP.AUTO: NEGATIVE
PH UR STRIP: 5 [PH] (ref 5–8)
PLATELET # BLD AUTO: 308 K/UL (ref 135–420)
PMV BLD AUTO: 10.2 FL (ref 9.2–11.8)
POTASSIUM SERPL-SCNC: 4.2 MMOL/L (ref 3.5–5.5)
PROT SERPL-MCNC: 7.3 G/DL (ref 6.4–8.2)
PROT UR STRIP-MCNC: NEGATIVE MG/DL
RBC # BLD AUTO: 4.73 M/UL (ref 4.2–5.3)
SODIUM SERPL-SCNC: 140 MMOL/L (ref 136–145)
SP GR UR REFRACTOMETRY: 1.02 (ref 1–1.03)
UROBILINOGEN UR QL STRIP.AUTO: 0.2 EU/DL (ref 0.2–1)
WBC # BLD AUTO: 10.2 K/UL (ref 4.6–13.2)

## 2018-11-15 PROCEDURE — 99283 EMERGENCY DEPT VISIT LOW MDM: CPT

## 2018-11-15 PROCEDURE — 81003 URINALYSIS AUTO W/O SCOPE: CPT

## 2018-11-15 PROCEDURE — 96361 HYDRATE IV INFUSION ADD-ON: CPT

## 2018-11-15 PROCEDURE — 96374 THER/PROPH/DIAG INJ IV PUSH: CPT

## 2018-11-15 PROCEDURE — 85025 COMPLETE CBC W/AUTO DIFF WBC: CPT

## 2018-11-15 PROCEDURE — 80053 COMPREHEN METABOLIC PANEL: CPT

## 2018-11-15 PROCEDURE — 74011250636 HC RX REV CODE- 250/636: Performed by: EMERGENCY MEDICINE

## 2018-11-15 PROCEDURE — 96375 TX/PRO/DX INJ NEW DRUG ADDON: CPT

## 2018-11-15 PROCEDURE — 83690 ASSAY OF LIPASE: CPT

## 2018-11-15 RX ORDER — PROCHLORPERAZINE EDISYLATE 5 MG/ML
10 INJECTION INTRAMUSCULAR; INTRAVENOUS
Status: COMPLETED | OUTPATIENT
Start: 2018-11-15 | End: 2018-11-15

## 2018-11-15 RX ORDER — FENTANYL CITRATE 50 UG/ML
50 INJECTION, SOLUTION INTRAMUSCULAR; INTRAVENOUS
Status: COMPLETED | OUTPATIENT
Start: 2018-11-15 | End: 2018-11-15

## 2018-11-15 RX ORDER — DIPHENHYDRAMINE HYDROCHLORIDE 50 MG/ML
25 INJECTION, SOLUTION INTRAMUSCULAR; INTRAVENOUS
Status: COMPLETED | OUTPATIENT
Start: 2018-11-15 | End: 2018-11-15

## 2018-11-15 RX ADMIN — FENTANYL CITRATE 50 MCG: 50 INJECTION, SOLUTION INTRAMUSCULAR; INTRAVENOUS at 20:05

## 2018-11-15 RX ADMIN — PROCHLORPERAZINE EDISYLATE 10 MG: 5 INJECTION INTRAMUSCULAR; INTRAVENOUS at 20:06

## 2018-11-15 RX ADMIN — DIPHENHYDRAMINE HYDROCHLORIDE 25 MG: 50 INJECTION, SOLUTION INTRAMUSCULAR; INTRAVENOUS at 20:08

## 2018-11-15 RX ADMIN — SODIUM CHLORIDE 1000 ML: 900 INJECTION, SOLUTION INTRAVENOUS at 19:40

## 2018-11-15 NOTE — ED TRIAGE NOTES
Lower right abdominal pain . Had same last week, seen at [facility] . Diagnosed \"with rare form of RA, starting with pain in abdomen\". Feels same per pt.

## 2018-11-16 NOTE — ED PROVIDER NOTES
Gladys Campos is a 62 y.o. Female with h/o recurrent abd pain, nv with c/o recurrent pain today after eating lunch with vomiting x 2. No diarrhea but has had bm and is passing flatus. Pt is sharp stabbing, upper abd with no blood in emesis or stool,  Melena. No cough, cp, sob, fcs, urinary sx. Seen recently twice at [facility] for same with neg work up. Pain worse with movement, palpation. The history is provided by the patient. Past Medical History:  
Diagnosis Date  Abdominal adhesions  Anemia  Arthritis   
 all over the body  Asthma  Cocaine abuse ([facility]  Diabetes mellitus  Dyskinesia   
 bilateral  
 Essential hypertension  GERD (gastroesophageal reflux disease)  Hypertension  Menopause  Microhematuria  Stool color black Past Surgical History:  
Procedure Laterality Date  HX CHOLECYSTECTOMY  6/28/10  
 HX COLONOSCOPY    
 HX ENDOSCOPY    
 HX HERNIA REPAIR    
 HX HYSTERECTOMY Fibroids  HX KNEE REPLACEMENT Left  HX OOPHORECTOMY  12/2000  HX ORTHOPAEDIC Right   
 ankle- multiple surgeries  HX SMALL BOWEL RESECTION  12/2000  HX SMALL BOWEL RESECTION  02/21/2017 Dr. Jeison Naranjo [id number] [facility] Family History:  
Problem Relation Age of Onset  Hypertension Other   
     parent  Breast Cancer Other 21  
 Heart Disease Father  Hypertension Father  Diabetes Father  Diabetes Mother  Hypertension Other   
     sibling  Diabetes Other   
     parent  Diabetes Sister  Kidney Disease Brother  Diabetes Brother Social History Socioeconomic History  Marital status:  Spouse name: Not on file  Number of children: Not on file  Years of education: Not on file  Highest education level: Not on file Social Needs  Financial resource strain: Not on file  Food insecurity - worry: Not on file  Food insecurity - inability: Not on file  Transportation needs - medical: Not on file  Transportation needs - non-medical: Not on file Occupational History  Not on file Tobacco Use  Smoking status: Never Smoker  Smokeless tobacco: Never Used Substance and Sexual Activity  Alcohol use: No  
 Drug use: No  
 Sexual activity: Yes  
  Partners: Male Birth control/protection: None Other Topics Concern  Not on file Social History Narrative  Not on file ALLERGIES: Macrodantin [nitrofurantoin macrocrystalline] and Tape [adhesive] Review of Systems Constitutional: Positive for appetite change. Negative for fever. HENT: Negative for sore throat. Eyes: Negative for visual disturbance. Cardiovascular: Negative for chest pain. Gastrointestinal: Positive for abdominal pain. Endocrine: Negative for polyuria. Genitourinary: Negative for difficulty urinating and dysuria. Musculoskeletal: Negative for gait problem. Skin: Negative for rash. Allergic/Immunologic: Negative for immunocompromised state. Neurological: Negative for syncope. Psychiatric/Behavioral: Positive for sleep disturbance. Vitals:  
 11/15/18 2030 11/15/18 2045 11/15/18 2100 11/15/18 2145 BP: 125/81 128/81 125/78 108/68 Pulse:      
Resp:      
Temp:      
SpO2: 100% 100% 100% 99% Weight:      
Height:      
      
 
Physical Exam  
Constitutional: She is oriented to person, place, and time. She appears well-developed and well-nourished. No distress. HENT:  
Head: Normocephalic and atraumatic. Right Ear: External ear normal.  
Left Ear: External ear normal.  
Nose: Nose normal.  
Mouth/Throat: Uvula is midline, oropharynx is clear and moist and mucous membranes are normal.  
Eyes: Conjunctivae are normal. No scleral icterus. Neck: Neck supple. Cardiovascular: Normal rate, regular rhythm, normal heart sounds and intact distal pulses. Pulmonary/Chest: Effort normal and breath sounds normal.  
Abdominal: Soft. She exhibits no distension and no mass. There is tenderness in the epigastric area. There is guarding. There is no rebound and no CVA tenderness. Musculoskeletal: She exhibits no edema. Neurological: She is alert and oriented to person, place, and time. Gait normal.  
Skin: Skin is warm and dry. Capillary refill takes less than 2 seconds. She is not diaphoretic. Psychiatric: Her behavior is normal.  
Nursing note and vitals reviewed. MDM Procedures Vitals: 
Patient Vitals for the past 12 hrs: 
 Temp Pulse Resp BP SpO2  
11/15/18 2145    108/68 99 % 11/15/18 2100    125/78 100 % 11/15/18 2045    128/81 100 % 11/15/18 2030    125/81 100 % 11/15/18 2015    128/83 99 % 11/15/18 2000    (!) 136/92 100 % 11/15/18 1945    135/89 100 % 11/15/18 1930    138/89 99 % 11/15/18 1700 98.2 °F (36.8 °C) (!) 103 17 (!) 160/105 100 % Medications ordered:  
Medications  
sodium chloride 0.9 % bolus infusion 1,000 mL (0 mL IntraVENous IV Completed 11/15/18 2107) fentaNYL citrate (PF) injection 50 mcg (50 mcg IntraVENous Given 11/15/18 2005) prochlorperazine (COMPAZINE) injection 10 mg (10 mg IntraVENous Given 11/15/18 2006) diphenhydrAMINE (BENADRYL) injection 25 mg (25 mg IntraVENous Given 11/15/18 2008) Lab findings: 
Recent Results (from the past 12 hour(s)) CBC WITH AUTOMATED DIFF Collection Time: 11/15/18  5:38 PM  
Result Value Ref Range WBC 10.2 4.6 - 13.2 K/uL  
 RBC 4.73 4.20 - 5.30 M/uL  
 HGB 13.3 12.0 - 16.0 g/dL HCT 41.1 35.0 - 45.0 % MCV 86.9 74.0 - 97.0 FL  
 MCH 28.1 24.0 - 34.0 PG  
 MCHC 32.4 31.0 - 37.0 g/dL  
 RDW 15.4 (H) 11.6 - 14.5 % PLATELET 027 616 - 307 K/uL MPV 10.2 9.2 - 11.8 FL  
 NEUTROPHILS 75 (H) 40 - 73 % LYMPHOCYTES 18 (L) 21 - 52 % MONOCYTES 7 3 - 10 % EOSINOPHILS 0 0 - 5 % BASOPHILS 0 0 - 2 % ABS. NEUTROPHILS 7.6 1.8 - 8.0 K/UL  
 ABS. LYMPHOCYTES 1.8 0.9 - 3.6 K/UL  
 ABS. MONOCYTES 0.7 0.05 - 1.2 K/UL  
 ABS. EOSINOPHILS 0.0 0.0 - 0.4 K/UL  
 ABS. BASOPHILS 0.0 0.0 - 0.1 K/UL  
 DF AUTOMATED METABOLIC PANEL, COMPREHENSIVE Collection Time: 11/15/18  5:38 PM  
Result Value Ref Range Sodium 140 136 - 145 mmol/L Potassium 4.2 3.5 - 5.5 mmol/L Chloride 105 100 - 108 mmol/L  
 CO2 26 21 - 32 mmol/L Anion gap 9 3.0 - 18 mmol/L Glucose 119 (H) 74 - 99 mg/dL BUN 26 (H) 7.0 - 18 MG/DL Creatinine 1.19 0.6 - 1.3 MG/DL  
 BUN/Creatinine ratio 22 (H) 12 - 20 GFR est AA 56 (L) >60 ml/min/1.73m2 GFR est non-AA 47 (L) >60 ml/min/1.73m2 Calcium 9.7 8.5 - 10.1 MG/DL Bilirubin, total 0.3 0.2 - 1.0 MG/DL  
 ALT (SGPT) 21 13 - 56 U/L  
 AST (SGOT) 9 (L) 15 - 37 U/L Alk. phosphatase 124 (H) 45 - 117 U/L Protein, total 7.3 6.4 - 8.2 g/dL Albumin 3.8 3.4 - 5.0 g/dL Globulin 3.5 2.0 - 4.0 g/dL A-G Ratio 1.1 0.8 - 1.7 LIPASE Collection Time: 11/15/18  5:38 PM  
Result Value Ref Range Lipase 267 73 - 393 U/L  
URINALYSIS W/ RFLX MICROSCOPIC Collection Time: 11/15/18  5:40 PM  
Result Value Ref Range Color YELLOW Appearance CLEAR Specific gravity 1.023 1.005 - 1.030    
 pH (UA) 5.0 5.0 - 8.0 Protein NEGATIVE  NEG mg/dL Glucose >1,000 (A) NEG mg/dL Ketone NEGATIVE  NEG mg/dL Bilirubin NEGATIVE  NEG Blood NEGATIVE  NEG Urobilinogen 0.2 0.2 - 1.0 EU/dL Nitrites NEGATIVE  NEG Leukocyte Esterase NEGATIVE  NEG    
 
 
EKG interpretation by ED Physician: X-Ray, CT or other radiology findings or impressions: No orders to display Progress notes, Consult notes or additional Procedure notes:  
abd soft. Doubt need for repeat ct here. Labs unremarkable Improved after meds, fluids. abd soft I have discussed with patient and/or family/sig other the results, interpretation of any imaging if performed, suspected diagnosis and treatment plan to include instructions regarding the diagnoses listed to which understanding was expressed with all questions answered Reevaluation of patient:  
stable Disposition: 
Diagnosis: 1. Recurrent epigastric abdominal pain 2. Non-intractable vomiting with nausea, unspecified vomiting type Disposition: home Follow-up Information Follow up With Specialties Details Why Contact Info Adalid Grullon MD Family Practice, Internal Medicine Schedule an appointment as soon as possible for a visit  [place] [phone] [place] [phone] 
[phone] Medication List  
  
ASK your doctor about these medications   
albuterol 90 mcg/actuation inhaler Commonly known as:  PROVENTIL HFA, VENTOLIN HFA, PROAIR HFA Take 1 Puff by inhalation every six (6) hours as needed for Wheezing. ALLERGY RELIEF (CETIRIZINE) 10 mg tablet Generic drug:  cetirizine 
  
amLODIPine 2.5 mg tablet Commonly known as:  NORVASC 
take 1 tablet by mouth NIGHTLY 
  
atorvastatin 40 mg tablet Commonly known as:  LIPITOR Take 1 Tab by mouth nightly. bisoprolol-hydroCHLOROthiazide 2.5-6.25 mg per tablet Commonly known as:  [facility] Take 1 Tab by mouth daily. carbidopa-levodopa  mg per tablet Commonly known as:  SINEMET 
  
dapagliflozin 10 mg Tab tablet Commonly known as:  [employer] Take 1 Tab by mouth daily. Indications: type 2 diabetes mellitus 
  
doxepin 10 mg capsule Commonly known as:  SINEquan Take 1 Cap by mouth nightly. glucose blood VI test strips strip Commonly known as:  ONETOUCH ULTRA TEST Check blood glucose as directed HYDROcodone-acetaminophen 5-325 mg per tablet Commonly known as:  Madeleine Oak 
take 1 tablet by mouth every 4 hours if needed 
  
ibuprofen 800 mg tablet Commonly known as:  MOTRIN Take 1 Tab by mouth every eight (8) hours as needed for Pain. magnesium citrate solution Take 1/3 of bottle every 8 hours until finished or until you have a bowel movement. metFORMIN 500 mg Tg24 24 hour tablet Commonly known as:  GLUMETZA ER 
take 1 tablet by mouth twice a day 
  
omeprazole 10 mg capsule Commonly known as:  PRILOSEC Take 1 Cap by mouth daily. ondansetron 4 mg disintegrating tablet Commonly known as:  ZOFRAN ODT Take 1 Tab by mouth every eight (8) hours as needed for Nausea. polyethylene glycol 17 gram/dose powder Commonly known as:  [employer] Take 17 g by mouth two (2) times a day. 1 capful with 8 oz of water daily 
  
prednisoLONE 5 mg Tab Take 1 tab daily for 5 days 
  
senna 8.6 mg tablet Commonly known as:  [employer] Take 1 Tab by mouth daily. sulfaSALAzine 500 mg tablet Commonly known as:  AZULFIDINE

## 2018-11-16 NOTE — ED NOTES
I have reviewed discharge instructions with the patient. The patient verbalized understanding. Patient armband removed and shredded. Patient will redress self and ambulate independently out of care area. Pt states she will call her  who will pick her up and drive her home

## 2018-11-16 NOTE — DISCHARGE INSTRUCTIONS

## 2018-11-20 ENCOUNTER — OFFICE VISIT (OUTPATIENT)
Dept: FAMILY MEDICINE CLINIC | Age: 58
End: 2018-11-20

## 2018-11-20 ENCOUNTER — APPOINTMENT (OUTPATIENT)
Dept: PHYSICAL THERAPY | Age: 58
End: 2018-11-20

## 2018-11-20 VITALS
HEART RATE: 91 BPM | OXYGEN SATURATION: 99 % | SYSTOLIC BLOOD PRESSURE: 133 MMHG | BODY MASS INDEX: 27.66 KG/M2 | HEIGHT: 64 IN | TEMPERATURE: 98.3 F | RESPIRATION RATE: 14 BRPM | DIASTOLIC BLOOD PRESSURE: 87 MMHG | WEIGHT: 162 LBS

## 2018-11-20 DIAGNOSIS — R10.9 CHRONIC ABDOMINAL PAIN: ICD-10-CM

## 2018-11-20 DIAGNOSIS — E09.9 STEROID-INDUCED DIABETES (HCC): Primary | ICD-10-CM

## 2018-11-20 DIAGNOSIS — M05.741 RHEUMATOID ARTHRITIS INVOLVING BOTH HANDS WITH POSITIVE RHEUMATOID FACTOR (HCC): ICD-10-CM

## 2018-11-20 DIAGNOSIS — T38.0X5A STEROID-INDUCED DIABETES (HCC): Primary | ICD-10-CM

## 2018-11-20 DIAGNOSIS — I10 ESSENTIAL HYPERTENSION: Chronic | ICD-10-CM

## 2018-11-20 DIAGNOSIS — G89.29 CHRONIC ABDOMINAL PAIN: ICD-10-CM

## 2018-11-20 DIAGNOSIS — M05.742 RHEUMATOID ARTHRITIS INVOLVING BOTH HANDS WITH POSITIVE RHEUMATOID FACTOR (HCC): ICD-10-CM

## 2018-11-20 RX ORDER — HYDROCODONE BITARTRATE AND ACETAMINOPHEN 5; 325 MG/1; MG/1
TABLET ORAL
Qty: 20 TAB | Refills: 0 | Status: SHIPPED | OUTPATIENT
Start: 2018-11-20 | End: 2018-12-06 | Stop reason: SDUPTHER

## 2018-11-20 RX ORDER — FOLIC ACID 1 MG/1
TABLET ORAL DAILY
Status: ON HOLD | COMMUNITY
End: 2020-09-12

## 2018-11-20 RX ORDER — HYDROXYCHLOROQUINE SULFATE 200 MG/1
200 TABLET, FILM COATED ORAL DAILY
COMMUNITY
End: 2019-07-30

## 2018-11-20 RX ORDER — METHOTREXATE 2.5 MG/1
7.5 TABLET ORAL
COMMUNITY
End: 2020-04-22

## 2018-11-20 NOTE — PROGRESS NOTES
Ravi Osorio is a 62 y.o.  female and presents with    Chief Complaint   Patient presents with    Medication Evaluation     Subjective:  Mrs. Mayra Baker presents for f/u rheumatoid arthritis and for form completion for short term disability. She c/o joint pain and has appointment scheduled with rheumatologist dr. Gayatri Colunga. She is currently taking prednisone 10 mg daily and  has elevated glucose to 150. She has had swelling in her hands which has improved with prednisone. sulfasalazine has caused nausea     She has nausea and vomiting; she has been taking zofran     Hypertension  Patient is here for follow-up of hypertension.  She indicates that she is feeling well and denies any symptoms referable to her hypertension. She is exercising and is adherent to low salt diet.  Blood pressure is well controlled at home. Use of agents associated with hypertension: none    Patient Active Problem List   Diagnosis Code    Osteoarthritis of left knee M17.12    Hypertension I10    Hyperlipidemia E78.5    GERD (gastroesophageal reflux disease) K21.9    Asthma J45.909    Type 2 diabetes mellitus ([address] E11.9    SBO [facility]small bowel obstruction) ([facility]) K56.609    Chronic abdominal pain R10.9, G89.29    Post-operative pain G89.18    Chest pain R07.9    Essential hypertension, benign I10    Sural neuritis G57.80    Type 2 diabetes mellitus with nephropathy ([facility]) E11.21    Non-intractable cyclical vomiting with nausea G43. A0    Bowel obstruction ([address] K56.609     Patient Active Problem List    Diagnosis Date Noted    Bowel obstruction ([address] 08/23/2018    Non-intractable cyclical vomiting with nausea 01/24/2018    Type 2 diabetes mellitus with nephropathy ([address] 01/19/2018    Chronic abdominal pain 03/14/2017    Post-operative pain 03/14/2017    SBO (small bowel obstruction) ([address] 02/21/2017    Osteoarthritis of left knee 06/27/2016    Hypertension 06/27/2016    Hyperlipidemia 06/27/2016    GERD (gastroesophageal reflux disease) 06/27/2016    Asthma 06/27/2016    [facility] 06/27/2016    Sural neuritis 06/23/2014    Chest pain 04/20/2014    Essential hypertension, benign 02/27/2012     Current Outpatient Medications   Medication Sig Dispense Refill    folic acid (FOLVITE) 1 mg tablet Take  by mouth daily.  hydroxychloroquine (PLAQUENIL) 200 mg tablet Take 200 mg by mouth daily.  methotrexate (RHEUMATREX) 2.5 mg tablet Take  by mouth.  sulfaSALAzine (AZULFIDINE) 500 mg tablet 500 mg.      metFORMIN (GLUMETZA ER) 500 mg TG24 24 hour tablet take 1 tablet by mouth twice a day 60 Tab 2    ondansetron (ZOFRAN ODT) 4 mg disintegrating tablet Take 1 Tab by mouth every eight (8) hours as needed for Nausea. 30 Tab 0    HYDROcodone-acetaminophen (NORCO) 5-325 mg per tablet take 1 tablet by mouth every 4 hours if needed 20 Tab 0    prednisoLONE 5 mg tab Take 1 tab daily for 5 days 5 Tab 0    ibuprofen (MOTRIN) 800 mg tablet Take 1 Tab by mouth every eight (8) hours as needed for Pain. 30 Tab 1    magnesium citrate solution Take 1/3 of bottle every 8 hours until finished or until you have a bowel movement. 1 Bottle 0    bisoprolol-hydroCHLOROthiazide (ZIAC) 2.5-6.25 mg per tablet Take 1 Tab by mouth daily. 90 Tab 3    amLODIPine (NORVASC) 2.5 mg tablet take 1 tablet by mouth NIGHTLY 90 Tab 1    ALLERGY RELIEF, CETIRIZINE, 10 mg tablet take 1 tablet by mouth once daily  0    dapagliflozin (FARXIGA) 10 mg tab tablet Take 1 Tab by mouth daily. Indications: type 2 diabetes mellitus 90 Tab 3    omeprazole (PRILOSEC) 10 mg capsule Take 1 Cap by mouth daily. 30 Cap 11    albuterol (PROVENTIL HFA, VENTOLIN HFA, PROAIR HFA) 90 mcg/actuation inhaler Take 1 Puff by inhalation every six (6) hours as needed for Wheezing. 1 Inhaler 0    atorvastatin (LIPITOR) 40 mg tablet Take 1 Tab by mouth nightly.  90 Tab 3    glucose blood VI test strips (ONETOUCH ULTRA TEST) strip Check blood glucose as directed 100 Strip 1    carbidopa-levodopa (SINEMET)  mg per tablet Take 1-2 Tabs by Mouth Every Night at Bedtime.  polyethylene glycol (MIRALAX) 17 gram/dose powder Take 17 g by mouth two (2) times a day. 1 capful with 8 oz of water daily 119 g 0    senna (SENOKOT) 8.6 mg tablet Take 1 Tab by mouth daily. 30 Tab 0    doxepin (SINEQUAN) 10 mg capsule Take 1 Cap by mouth nightly.  30 Cap 11     Allergies   Allergen Reactions    Macrodantin [Nitrofurantoin Macrocrystalline] Itching and Other (comments)    Tape [Adhesive] Other (comments)     Paper tape-- feels like burning skin  Burned skin when had wound vac     Past Medical History:   Diagnosis Date    Abdominal adhesions     Anemia     Arthritis     all over the body    Asthma     Cocaine abuse ([address]     Diabetes mellitus     Dyskinesia     bilateral    Essential hypertension     GERD (gastroesophageal reflux disease)     Hypertension     Menopause     Microhematuria     Stool color black      Past Surgical History:   Procedure Laterality Date    HX CHOLECYSTECTOMY  6/28/10    HX COLONOSCOPY      HX ENDOSCOPY      HX HERNIA REPAIR      HX HYSTERECTOMY      Fibroids    HX KNEE REPLACEMENT Left     HX OOPHORECTOMY  12/2000    HX ORTHOPAEDIC Right     ankle- multiple surgeries    HX SMALL BOWEL RESECTION  12/2000    HX SMALL BOWEL RESECTION  02/21/2017    Dr. Jimmy Rashid       Family History   Problem Relation Age of Onset    Hypertension Other         parent    Breast Cancer Other 21    Heart Disease Father     Hypertension Father     Diabetes Father     Diabetes Mother     Hypertension Other         sibling    Diabetes Other         parent    Diabetes Sister     Kidney Disease Brother     Diabetes Brother      Social History     Tobacco Use    Smoking status: Never Smoker    Smokeless tobacco: Never Used   Substance Use Topics    Alcohol use: No       ROS   General ROS: negative for - chills or fever  Psychological ROS: negative for - anxiety or depression  Ophthalmic ROS: positive for - uses glasses  ENT ROS: negative for - headaches  Endocrine ROS: positive for - polydipsia/polyuria  Respiratory ROS: no cough, shortness of breath, or wheezing  Cardiovascular ROS: no chest pain or dyspnea on exertion  Gastrointestinal ROS: positive for - abdominal pain  negative for - constipation or heartburn  Genito-Urinary ROS: no dysuria, trouble voiding, or hematuria  Neurological ROS: no TIA or stroke symptoms  Dermatological ROS: negative for - rash or skin lesion changes    All other systems reviewed and are negative. Objective:  Vitals:    11/20/18 1547   BP: 133/87   Pulse: 91   Resp: 14   Temp: 98.3 °F (36.8 °C)   TempSrc: Oral   SpO2: 99%   Weight: 162 lb (73.5 kg)   Height: 5' 4\" (1.626 m)   PainSc:   8     Constitutional: She is oriented to person, place, and time. She appears well-developed and well-nourished. She appears calm   HENT:   Head: Normocephalic and atraumatic. Mouth/Throat: Oropharynx is clear and moist.   Eyes: conjunctival erythema  Neck: Neck supple. No tracheal deviation present. Cardiovascular: Regular rhythm.    No murmur heard. Pulmonary/Chest: Effort normal and breath sounds normal. No respiratory distress. Abdominal: Soft. No tenderness  Musculoskeletal: right knee with pain with motion  Neurological: antalgic gait; No gross neuro deficit    Assessment/Plan:    1. Rheumatoid arthritis involving both hands with positive rheumatoid factor (HCC)  Refer to rheumatology and assist with pain   - HYDROcodone-acetaminophen (NORCO) 5-325 mg per tablet; take 1 tablet by mouth every 4 hours if needed  Dispense: 20 Tab; Refill: 0    2. Steroid-induced diabetes ([address]  Continue treatment    3. Chronic abdominal pain  Pain management    4.  Essential hypertension  Goal <130/80; borderline controlled; continue medications and improve pain management      Lab review: no lab studies available for review at time of visit      I have discussed the diagnosis with the patient and the intended plan as seen in the above orders. The patient has received an after-visit summary and questions were answered concerning future plans. I have discussed medication side effects and warnings with the patient as well. I have reviewed the plan of care with the patient, accepted their input and they are in agreement with the treatment goals. Follow-up Disposition:  Return in about 2 weeks (around 12/4/2018) for medication evaluation. More than 1/2 of this 25 minute visit was spent in counselling and coordination of care, as described above.

## 2018-11-20 NOTE — PROGRESS NOTES
Chief Complaint   Patient presents with    Medication Evaluation     1. Have you been to the ER, urgent care clinic since your last visit? Hospitalized since your last visit? Yes When: 11/15 Where: [facility] Reason for visit: Abdominal pain    2. Have you seen or consulted any other health care providers outside of the [address] [place] [address] since your last visit? Include any pap smears or colon screening. Yes Where: Dr. Nelia Pereyra.

## 2018-12-06 ENCOUNTER — OFFICE VISIT (OUTPATIENT)
Dept: FAMILY MEDICINE CLINIC | Age: 58
End: 2018-12-06

## 2018-12-06 VITALS
RESPIRATION RATE: 17 BRPM | SYSTOLIC BLOOD PRESSURE: 124 MMHG | DIASTOLIC BLOOD PRESSURE: 86 MMHG | BODY MASS INDEX: 27.76 KG/M2 | HEART RATE: 83 BPM | WEIGHT: 162.6 LBS | OXYGEN SATURATION: 98 % | HEIGHT: 64 IN | TEMPERATURE: 98.4 F

## 2018-12-06 DIAGNOSIS — Z12.39 SCREENING FOR BREAST CANCER: Primary | ICD-10-CM

## 2018-12-06 DIAGNOSIS — M05.742 RHEUMATOID ARTHRITIS INVOLVING BOTH HANDS WITH POSITIVE RHEUMATOID FACTOR (HCC): ICD-10-CM

## 2018-12-06 DIAGNOSIS — M05.741 RHEUMATOID ARTHRITIS INVOLVING BOTH HANDS WITH POSITIVE RHEUMATOID FACTOR (HCC): ICD-10-CM

## 2018-12-06 RX ORDER — ALBUTEROL SULFATE 90 UG/1
1 AEROSOL, METERED RESPIRATORY (INHALATION)
Qty: 1 INHALER | Refills: 0 | Status: SHIPPED | OUTPATIENT
Start: 2018-12-06 | End: 2019-11-20

## 2018-12-06 RX ORDER — HYDROCODONE BITARTRATE AND ACETAMINOPHEN 5; 325 MG/1; MG/1
TABLET ORAL
Qty: 12 TAB | Refills: 0 | Status: SHIPPED | OUTPATIENT
Start: 2018-12-06 | End: 2018-12-26

## 2018-12-06 NOTE — PROGRESS NOTES
Lavonne Tillman is a 62 y.o female that is present in the office for a routine appointment for medication evaluation. Patient complains of right knee pain and back pain. 1. Have you been to the ER, urgent care clinic since your last visit? Hospitalized since your last visit? No 
 
2. Have you seen or consulted any other health care providers outside of the [address] [place] [address] since your last visit? Include any pap smears or colon screening. No 
 
Health Maintenance Due Topic Date Due  
 DTaP/Tdap/Td series (1 - Tdap) 02/04/1981  PAP AKA CERVICAL CYTOLOGY  02/04/1981  Shingrix Vaccine Age 50> (1 of 2) 02/04/2010

## 2018-12-06 NOTE — PROGRESS NOTES
Jenel Lesch is a 62 y.o.  female and presents with Chief Complaint Patient presents with  Medication Evaluation Subjective: 
Mrs. López Ramos presents for f/u rheumatoid arthritis and for form completion for short term disability. She c/o joint pain and has appointment scheduled with rheumatologist dr. Nelai Pereyra. John Gaviria is currently taking prednisone 10 mg daily and  has elevated glucose to 150.  She has had swelling in her hands which has improved with prednisone.  sulfasalazine has caused nausea 
  She has nausea and vomiting; she has been taking zofran 
  
Hypertension Patient is here for follow-up of hypertension.  She indicates that she is feeling well and denies any symptoms referable to her hypertension. She is exercising and is adherent to low salt diet.  Blood pressure is well controlled at home. Use of agents associated with hypertension: none Patient Active Problem List  
Diagnosis Code  Osteoarthritis of left knee M17.12  
 Hypertension I10  
 Hyperlipidemia E78.5  GERD (gastroesophageal reflux disease) K21.9  Asthma J45.909  Type 2 diabetes mellitus (HCC) E11.9  
 SBO (small bowel obstruction) ([address] K56.609  Chronic abdominal pain R10.9, G89.29  
 Post-operative pain G89.18  Chest pain R07.9  Essential hypertension, benign I10  
 Sural neuritis G57.80  Type 2 diabetes mellitus with nephropathy (HCC) E11.21  
 Non-intractable cyclical vomiting with nausea G43. A0  Bowel obstruction ([address] A13.528 Patient Active Problem List  
 Diagnosis Date Noted  Bowel obstruction ([address] 08/23/2018  Non-intractable cyclical vomiting with nausea 01/24/2018  Type 2 diabetes mellitus with nephropathy ([address] 01/19/2018  Chronic abdominal pain 03/14/2017  Post-operative pain 03/14/2017  
 SBO (small bowel obstruction) ([address] 02/21/2017  Osteoarthritis of left knee 06/27/2016  Hypertension 06/27/2016  Hyperlipidemia 06/27/2016  GERD (gastroesophageal reflux disease) 06/27/2016  Asthma 06/27/2016  [facility] diabetes mellitus [facility] 06/27/2016  Sural neuritis 06/23/2014  Chest pain 04/20/2014  Essential hypertension, benign 02/27/2012 Current Outpatient Medications Medication Sig Dispense Refill  folic acid (FOLVITE) 1 mg tablet Take  by mouth daily.  hydroxychloroquine (PLAQUENIL) 200 mg tablet Take 200 mg by mouth daily.  methotrexate (RHEUMATREX) 2.5 mg tablet Take  by mouth.  HYDROcodone-acetaminophen (NORCO) 5-325 mg per tablet take 1 tablet by mouth every 4 hours if needed 20 Tab 0  carbidopa-levodopa (SINEMET)  mg per tablet Take 1-2 Tabs by Mouth Every Night at Bedtime.  sulfaSALAzine (AZULFIDINE) 500 mg tablet 500 mg.    
 metFORMIN (GLUMETZA ER) 500 mg TG24 24 hour tablet take 1 tablet by mouth twice a day 60 Tab 2  
 ondansetron (ZOFRAN ODT) 4 mg disintegrating tablet Take 1 Tab by mouth every eight (8) hours as needed for Nausea. 30 Tab 0  prednisoLONE 5 mg tab Take 1 tab daily for 5 days 5 Tab 0  ibuprofen (MOTRIN) 800 mg tablet Take 1 Tab by mouth every eight (8) hours as needed for Pain. 30 Tab 1  
 magnesium citrate solution Take 1/3 of bottle every 8 hours until finished or until you have a bowel movement. 1 Bottle 0  
 bisoprolol-hydroCHLOROthiazide (ZIAC) 2.5-6.25 mg per tablet Take 1 Tab by mouth daily. 90 Tab 3  polyethylene glycol (MIRALAX) 17 gram/dose powder Take 17 g by mouth two (2) times a day. 1 capful with 8 oz of water daily 119 g 0  
 senna (SENOKOT) 8.6 mg tablet Take 1 Tab by mouth daily. 30 Tab 0  
 amLODIPine (NORVASC) 2.5 mg tablet take 1 tablet by mouth NIGHTLY 90 Tab 1  ALLERGY RELIEF, CETIRIZINE, 10 mg tablet take 1 tablet by mouth once daily  0  
 doxepin (SINEQUAN) 10 mg capsule Take 1 Cap by mouth nightly. 30 Cap 11  
 dapagliflozin (FARXIGA) 10 mg tab tablet Take 1 Tab by mouth daily. Indications: type 2 diabetes mellitus 90 Tab 3  
 omeprazole (PRILOSEC) 10 mg capsule Take 1 Cap by mouth daily. 30 Cap 11  
 albuterol (PROVENTIL HFA, VENTOLIN HFA, PROAIR HFA) 90 mcg/actuation inhaler Take 1 Puff by inhalation every six (6) hours as needed for Wheezing. 1 Inhaler 0  
 atorvastatin (LIPITOR) 40 mg tablet Take 1 Tab by mouth nightly. 90 Tab 3  
 glucose blood VI test strips (ONETOUCH ULTRA TEST) strip Check blood glucose as directed 100 Strip 1 Allergies Allergen Reactions  Macrodantin [Nitrofurantoin Macrocrystalline] Itching and Other (comments)  Tape [Adhesive] Other (comments) Paper tape-- feels like burning skin Burned skin when had wound vac Past Medical History:  
Diagnosis Date  Abdominal adhesions  Anemia  Arthritis   
 all over the body  Asthma  Cocaine abuse ([address]  Diabetes mellitus  Dyskinesia   
 bilateral  
 Essential hypertension  GERD (gastroesophageal reflux disease)  Hypertension  Menopause  Microhematuria  Stool color black Past Surgical History:  
Procedure Laterality Date  HX CHOLECYSTECTOMY  6/28/10  
 HX COLONOSCOPY    
 HX ENDOSCOPY    
 HX HERNIA REPAIR    
 HX HYSTERECTOMY Fibroids  HX KNEE REPLACEMENT Left  HX OOPHORECTOMY  12/2000  HX ORTHOPAEDIC Right   
 ankle- multiple surgeries  HX SMALL BOWEL RESECTION  12/2000  HX SMALL BOWEL RESECTION  02/21/2017 Dr. Villalta Shannan [address] Family History Problem Relation Age of Onset  Hypertension Other   
     parent  Breast Cancer Other 21  
 Heart Disease Father  Hypertension Father  Diabetes Father  Diabetes Mother  Hypertension Other   
     sibling  Diabetes Other   
     parent  Diabetes Sister  Kidney Disease Brother  Diabetes Brother Social History Tobacco Use  Smoking status: Never Smoker  Smokeless tobacco: Never Used Substance Use Topics  Alcohol use: No  
 
 
ROS General ROS: negative for - chills or fever Psychological ROS: negative for - anxiety or depression Ophthalmic ROS: positive for - uses glasses ENT ROS: negative for - headaches Endocrine ROS: positive for - polydipsia/polyuria Respiratory ROS: no cough, shortness of breath, or wheezing Cardiovascular ROS: no chest pain or dyspnea on exertion Gastrointestinal ROS: positive for - abdominal pain 
negative for - constipation or heartburn Genito-Urinary ROS: no dysuria, trouble voiding, or hematuria Neurological ROS: no TIA or stroke symptoms Dermatological ROS: negative for - rash or skin lesion changes All other systems reviewed and are negative. Objective: 
Vitals:  
 12/06/18 1607 BP: 124/86 Pulse: 83 Resp: 17 Temp: 98.4 °F (36.9 °C) TempSrc: Oral  
SpO2: 98% Weight: 162 lb 9.6 oz (73.8 kg) Height: 5' 4\" (1.626 m) PainSc:   9 PainLoc: Knee Constitutional: She is oriented to person, place, and time. She appears well-developed and well-nourished. She appears calm  
HENT:  
Head: Normocephalic and atraumatic. Mouth/Throat: Oropharynx is clear and moist.  
Eyes: conjunctival erythema Neck: Neck supple. No tracheal deviation present. Cardiovascular: Regular rhythm.   
No murmur heard. Pulmonary/Chest: Effort normal and breath sounds normal. No respiratory distress. Abdominal: Soft. No tenderness Musculoskeletal: right knee with pain with motion Neurological: antalgic gait; No gross neuro deficit Assessment/Plan: 1. Rheumatoid arthritis involving both hands with positive rheumatoid factor ([facility] Pain management 
- HYDROcodone-acetaminophen (NORCO) 5-325 mg per tablet; take 1 tablet by mouth every 4 hours if needed  Dispense: 12 Tab; Refill: 0 
 
2. Screening for breast cancer - DIOR MAMMO BI SCREENING INCL CAD; Future Lab review: no lab studies available for review at time of visit I have discussed the diagnosis with the patient and the intended plan as seen in the above orders. The patient has received an after-visit summary and questions were answered concerning future plans. I have discussed medication side effects and warnings with the patient as well. I have reviewed the plan of care with the patient, accepted their input and they are in agreement with the treatment goals. Follow-up Disposition: 
Return if symptoms worsen or fail to improve.

## 2018-12-12 ENCOUNTER — OFFICE VISIT (OUTPATIENT)
Dept: SURGERY | Age: 58
End: 2018-12-12

## 2018-12-12 VITALS
SYSTOLIC BLOOD PRESSURE: 141 MMHG | DIASTOLIC BLOOD PRESSURE: 93 MMHG | HEART RATE: 81 BPM | HEIGHT: 64 IN | WEIGHT: 165 LBS | BODY MASS INDEX: 28.17 KG/M2 | OXYGEN SATURATION: 100 % | TEMPERATURE: 99.6 F

## 2018-12-12 DIAGNOSIS — R22.42 KNEE MASS, LEFT: ICD-10-CM

## 2018-12-12 DIAGNOSIS — R22.42 MASS OF LEFT THIGH: Primary | ICD-10-CM

## 2018-12-12 RX ORDER — AMLODIPINE BESYLATE 2.5 MG/1
TABLET ORAL
Qty: 90 TAB | Refills: 1 | Status: SHIPPED | OUTPATIENT
Start: 2018-12-12 | End: 2019-11-22 | Stop reason: SDUPTHER

## 2018-12-12 NOTE — PROGRESS NOTES
Chief Complaint   Patient presents with    Mass     2 \"tumors\" on the back of the left leg     Megan reports that she has been told she had two tumors on her right leg. One is just behind the knee and the other is on her mid thigh. 1. Have you been to the ER, urgent care clinic since your last visit? Hospitalized since your last visit? No    2. Have you seen or consulted any other health care providers outside of the [address] [place] [address] since your last visit? Include any pap smears or colon screening.  No

## 2018-12-12 NOTE — PATIENT INSTRUCTIONS
If you have any questions or concerns about today's appointment, the verbal and/or written instructions you were given for follow up care, please call our office at [phone]. [address] [place] [address] [facility]  [id number] [place], [place] [address]    [phone] office  [phone] fax      . Chantel Curry PATIENT PRE AND POST OPERATIVE INSTRUCTIONS     [facility]  [phone]    Before Surgery Instructions:   1) You must have someone available to drive you to and from your procedure and stay with you for the first 24 hours. 2) It is very important that you have nothing to eat or drink after midnight the night before your surgery. This includes chewing gum or sucking on hard candy. Take only heart, blood pressure and cholesterol medications the morning of surgery with only a sip of water. 3) Please stop taking Plavix 10 days prior to your surgery. Stop taking Coumadin 5 days prior to your surgery. Stop taking all Aspirin or Aspirin containing products 7 days prior to your surgery. Stop taking Advil, Motrin, Aleve, and etc. 3 days prior to your surgery. 4) If you take any diabetic medications please consult with your primary care physician on how to take them on the day of your surgery. Do Not take Metformin the day before surgery and Do not take Metformin the day of surgery  5) Please stop all Herbal products 2 weeks prior to your surgery. 6) Please arrive at the hospital 2 hours prior to your surgery, unless you have been otherwise instructed. Any required labs/urine drug screen/x-rays will be performed on day of surgery. 7) If you are of child bearing age you will have pregnancy test done the morning of your surgery as soon as you arrive. 8) Patients having an operation on their colon will be given a separate instruction sheet on their Bowel Prep.   9) For any pre-operative work up check in at the main entrance to [address] [place] [address] [facility], and then go to Patient Registration. These studies are done on a walk in basis they are open from 7:00am to 5:00pm Monday through Friday. 10) Please wash your surgical site the morning of your surgery with antibacterial (i.e. Dial) soap and water. 11) You will be contacted by a hospital preadmission nurse approximately one week prior to scheduled surgery to discuss additional instructions and to review your medications. 12) You may be contacted to change your surgery time. At times this is necessary due to equipment, staffing needs or in the event of a cancellation. Surgery Date and Time:  Thursday, December 27, 2018 at 2:00pm     Please check in at [facility], enter through the Emergency Room entrance and go up to the second floor. Please check in by 12:00pmthe day of your surgery. After Surgery Instructions: You will need to be seen in the office for a follow-up visit 7-14 days after your surgery. Please call after you have had the procedure to make this appointment. Unless otherwise instructed, you may remove your outer bandage and shower 48 hours after your surgery. If you develop a fever greater than 101, have any significant drainage, bleeding, swelling and/or pus of the wound. Please call our office immediately. You may contact Suman Kraft with any questions at [phone].

## 2018-12-13 ENCOUNTER — HOSPITAL ENCOUNTER (OUTPATIENT)
Dept: MAMMOGRAPHY | Age: 58
Discharge: HOME | End: 2018-12-13
Attending: FAMILY MEDICINE
Payer: COMMERCIAL

## 2018-12-13 DIAGNOSIS — Z12.39 SCREENING FOR BREAST CANCER: ICD-10-CM

## 2018-12-13 DIAGNOSIS — Z12.31 VISIT FOR SCREENING MAMMOGRAM: ICD-10-CM

## 2018-12-13 PROCEDURE — 77063 BREAST TOMOSYNTHESIS BI: CPT

## 2018-12-13 NOTE — PROGRESS NOTES
General Surgery Consult    Juan Bazan  Admit date: (Not on file)    MRN: [id number]     : 1960     Age: 62 y.o. Attending Physician: Tere Kaye MD, [facility]      History of Present Illness:      Juan Bazan is a 62 y.o. female who is very well known to me. She has 2 left lower extremity masses that are causing her pain. She was seen by Dr. Mil Groves, her PCP who referred her to vascular, but vascular did a venous study and confirmed that it is not of vascular origin and they are soft tissue, so they sent her to me. The patient stated that the masses are causing pain and she would like them to be removed. She had them for about a year and they are increasing in size. She denies any fever or chills. She denies any drainage.      Patient Active Problem List    Diagnosis Date Noted    Bowel obstruction ([address] 2018    Non-intractable cyclical vomiting with nausea 2018    Type 2 diabetes mellitus with nephropathy ([address] 2018    Chronic abdominal pain 2017    Post-operative pain 2017    SBO (small bowel obstruction) ([address] 2017    Osteoarthritis of left knee 2016    Hypertension 2016    Hyperlipidemia 2016    GERD (gastroesophageal reflux disease) 2016    Asthma 2016    Type 2 diabetes mellitus ([address] 2016    Sural neuritis 2014    Chest pain 2014    Essential hypertension, benign 2012     Past Medical History:   Diagnosis Date    Abdominal adhesions     Anemia     Arthritis     all over the body    Asthma     Cocaine abuse ([address]     Diabetes mellitus     Dyskinesia     bilateral    Essential hypertension     GERD (gastroesophageal reflux disease)     Hypertension     Menopause     Microhematuria     Stool color black       Past Surgical History:   Procedure Laterality Date    HX CHOLECYSTECTOMY  6/28/10    HX COLONOSCOPY      HX ENDOSCOPY      HX HERNIA REPAIR      HX HYSTERECTOMY      Fibroids    HX KNEE REPLACEMENT Left     HX OOPHORECTOMY  12/2000    HX ORTHOPAEDIC Right     ankle- multiple surgeries    HX SMALL BOWEL RESECTION  12/2000    HX SMALL BOWEL RESECTION  02/21/2017    Dr. Randi Mares        Social History     Tobacco Use    Smoking status: Never Smoker    Smokeless tobacco: Never Used   Substance Use Topics    Alcohol use: No      Social History     Tobacco Use   Smoking Status Never Smoker   Smokeless Tobacco Never Used     Family History   Problem Relation Age of Onset    Hypertension Other         parent    Breast Cancer Other 21    Heart Disease Father     Hypertension Father     Diabetes Father     Diabetes Mother     Hypertension Other         sibling    Diabetes Other         parent    Diabetes Sister     Kidney Disease Brother     Diabetes Brother       Current Outpatient Medications   Medication Sig    HYDROcodone-acetaminophen (NORCO) 5-325 mg per tablet take 1 tablet by mouth every 4 hours if needed    albuterol (PROVENTIL HFA, VENTOLIN HFA, PROAIR HFA) 90 mcg/actuation inhaler Take 1 Puff by inhalation every six (6) hours as needed for Wheezing.  folic acid (FOLVITE) 1 mg tablet Take  by mouth daily.  hydroxychloroquine (PLAQUENIL) 200 mg tablet Take 200 mg by mouth daily.  methotrexate (RHEUMATREX) 2.5 mg tablet Take  by mouth.  sulfaSALAzine (AZULFIDINE) 500 mg tablet 500 mg.    metFORMIN (GLUMETZA ER) 500 mg TG24 24 hour tablet take 1 tablet by mouth twice a day    ondansetron (ZOFRAN ODT) 4 mg disintegrating tablet Take 1 Tab by mouth every eight (8) hours as needed for Nausea.  prednisoLONE 5 mg tab Take 1 tab daily for 5 days    bisoprolol-hydroCHLOROthiazide (ZIAC) 2.5-6.25 mg per tablet Take 1 Tab by mouth daily.  polyethylene glycol (MIRALAX) 17 gram/dose powder Take 17 g by mouth two (2) times a day.  1 capful with 8 oz of water daily    senna (SENOKOT) 8.6 mg tablet Take 1 Tab by mouth daily.  ALLERGY RELIEF, CETIRIZINE, 10 mg tablet take 1 tablet by mouth once daily    dapagliflozin (FARXIGA) 10 mg tab tablet Take 1 Tab by mouth daily. Indications: type 2 diabetes mellitus    omeprazole (PRILOSEC) 10 mg capsule Take 1 Cap by mouth daily.  atorvastatin (LIPITOR) 40 mg tablet Take 1 Tab by mouth nightly.  glucose blood VI test strips (ONETOUCH ULTRA TEST) strip Check blood glucose as directed    amLODIPine (NORVASC) 2.5 mg tablet take 1 tablet by mouth once daily NIGHTLY    carbidopa-levodopa (SINEMET)  mg per tablet Take 1-2 Tabs by Mouth Every Night at Bedtime.  ibuprofen (MOTRIN) 800 mg tablet Take 1 Tab by mouth every eight (8) hours as needed for Pain.  magnesium citrate solution Take 1/3 of bottle every 8 hours until finished or until you have a bowel movement.  doxepin (SINEQUAN) 10 mg capsule Take 1 Cap by mouth nightly. No current facility-administered medications for this visit. Allergies   Allergen Reactions    Macrodantin [Nitrofurantoin Macrocrystalline] Itching and Other (comments)    Tape [Adhesive] Other (comments)     Paper tape-- feels like burning skin  Burned skin when had wound vac          Review of Systems:  Constitutional: negative  Eyes: negative  Ears, Nose, Mouth, Throat, and Face: negative  Respiratory: negative  Cardiovascular: negative  Gastrointestinal: negative  Genitourinary:negative  Integument/Breast: positive for skin lesion(s) and soft tissue masses of left lower extremity.    Hematologic/Lymphatic: negative  Musculoskeletal:negative  Neurological: negative  Behavioral/Psychiatric: negative  Endocrine: negative  Allergic/Immunologic: negative    Objective:     Visit Vitals  BP (!) 141/93 (BP Patient Position: Sitting)   Pulse 81   Temp 99.6 °F (37.6 °C) (Oral)   Ht 5' 4\" (1.626 m)   Wt 74.8 kg (165 lb)   SpO2 100%   BMI 28.32 kg/m²       Physical Exam:      General:  in no apparent distress, alert, oriented times 3 and cooperative   Eyes:  conjunctivae and sclerae normal, pupils equal, round, reactive to light   Throat & Neck: no erythema or exudates noted and neck supple and symmetrical; no palpable masses           Abdomen:   rounded, soft, nontender, nondistended, no masses or organomegaly. Left Lower Extremity: There is a 5 by 3 cm large mass located on the posterior mid thigh that is soft and easily movable. Mildly tender to palpation with no overlying skin changes. There is a 2 cm posterior knee (Just below the knee joint) and it is also consistent with lipoma. Imaging and Lab Review:     CBC:   Lab Results   Component Value Date/Time    WBC 10.2 11/15/2018 05:38 PM    RBC 4.73 11/15/2018 05:38 PM    HGB 13.3 11/15/2018 05:38 PM    HCT 41.1 11/15/2018 05:38 PM    PLATELET 176 38/54/4322 05:38 PM     BMP:   Lab Results   Component Value Date/Time    Glucose 119 (H) 11/15/2018 05:38 PM    Sodium 140 11/15/2018 05:38 PM    Potassium 4.2 11/15/2018 05:38 PM    Chloride 105 11/15/2018 05:38 PM    CO2 26 11/15/2018 05:38 PM    BUN 26 (H) 11/15/2018 05:38 PM    Creatinine 1.19 11/15/2018 05:38 PM    Calcium 9.7 11/15/2018 05:38 PM     CMP:  Lab Results   Component Value Date/Time    Glucose 119 (H) 11/15/2018 05:38 PM    Sodium 140 11/15/2018 05:38 PM    Potassium 4.2 11/15/2018 05:38 PM    Chloride 105 11/15/2018 05:38 PM    CO2 26 11/15/2018 05:38 PM    BUN 26 (H) 11/15/2018 05:38 PM    Creatinine 1.19 11/15/2018 05:38 PM    Calcium 9.7 11/15/2018 05:38 PM    Anion gap 9 11/15/2018 05:38 PM    BUN/Creatinine ratio 22 (H) 11/15/2018 05:38 PM    Alk. phosphatase 124 (H) 11/15/2018 05:38 PM    Protein, total 7.3 11/15/2018 05:38 PM    Albumin 3.8 11/15/2018 05:38 PM    Globulin 3.5 11/15/2018 05:38 PM    A-G Ratio 1.1 11/15/2018 05:38 PM       No results found for this or any previous visit (from the past 24 hour(s)). images and reports reviewed    Assessment:   Liliane Redmond is a 62 y.o. female is presenting with left lower extremity masses (Posterior thigh and upper leg/lower knee) consistent with lipoma. Patient would like them to be removed because they are causing pain and increasing in size. Plan:     Schedule for removal of left thigh and left knee soft tissue masses.      Please call me if you have any questions (cell phone: [phone])     Signed By: Yoselin Mcgrath MD     December 13, 2018

## 2018-12-20 ENCOUNTER — HOSPITAL ENCOUNTER (EMERGENCY)
Age: 58
Discharge: HOME | End: 2018-12-20
Attending: EMERGENCY MEDICINE
Payer: COMMERCIAL

## 2018-12-20 VITALS
SYSTOLIC BLOOD PRESSURE: 152 MMHG | HEART RATE: 92 BPM | WEIGHT: 165 LBS | BODY MASS INDEX: 28.17 KG/M2 | OXYGEN SATURATION: 100 % | HEIGHT: 64 IN | TEMPERATURE: 98.1 F | DIASTOLIC BLOOD PRESSURE: 97 MMHG | RESPIRATION RATE: 16 BRPM

## 2018-12-20 DIAGNOSIS — M05.741 RHEUMATOID ARTHRITIS INVOLVING BOTH HANDS WITH POSITIVE RHEUMATOID FACTOR (HCC): ICD-10-CM

## 2018-12-20 DIAGNOSIS — M05.742 RHEUMATOID ARTHRITIS INVOLVING BOTH HANDS WITH POSITIVE RHEUMATOID FACTOR (HCC): ICD-10-CM

## 2018-12-20 DIAGNOSIS — G89.29 ABDOMINAL PAIN, CHRONIC, EPIGASTRIC: Primary | ICD-10-CM

## 2018-12-20 DIAGNOSIS — R10.13 ABDOMINAL PAIN, CHRONIC, EPIGASTRIC: Primary | ICD-10-CM

## 2018-12-20 LAB
ALBUMIN SERPL-MCNC: 3.8 G/DL (ref 3.4–5)
ALBUMIN/GLOB SERPL: 0.9 {RATIO} (ref 0.8–1.7)
ALP SERPL-CCNC: 127 U/L (ref 45–117)
ALT SERPL-CCNC: 21 U/L (ref 13–56)
ANION GAP SERPL CALC-SCNC: 9 MMOL/L (ref 3–18)
AST SERPL-CCNC: 15 U/L (ref 15–37)
BASOPHILS # BLD: 0 K/UL (ref 0–0.1)
BASOPHILS NFR BLD: 0 % (ref 0–2)
BILIRUB DIRECT SERPL-MCNC: <0.1 MG/DL (ref 0–0.2)
BILIRUB SERPL-MCNC: 0.2 MG/DL (ref 0.2–1)
BUN SERPL-MCNC: 19 MG/DL (ref 7–18)
BUN/CREAT SERPL: 17 (ref 12–20)
CALCIUM SERPL-MCNC: 9.5 MG/DL (ref 8.5–10.1)
CHLORIDE SERPL-SCNC: 106 MMOL/L (ref 100–108)
CO2 SERPL-SCNC: 25 MMOL/L (ref 21–32)
CREAT SERPL-MCNC: 1.11 MG/DL (ref 0.6–1.3)
DIFFERENTIAL METHOD BLD: ABNORMAL
EOSINOPHIL # BLD: 0 K/UL (ref 0–0.4)
EOSINOPHIL NFR BLD: 0 % (ref 0–5)
ERYTHROCYTE [DISTWIDTH] IN BLOOD BY AUTOMATED COUNT: 15.3 % (ref 11.6–14.5)
GLOBULIN SER CALC-MCNC: 4.3 G/DL (ref 2–4)
GLUCOSE SERPL-MCNC: 104 MG/DL (ref 74–99)
HCT VFR BLD AUTO: 41 % (ref 35–45)
HGB BLD-MCNC: 13.1 G/DL (ref 12–16)
LIPASE SERPL-CCNC: 259 U/L (ref 73–393)
LYMPHOCYTES # BLD: 1.5 K/UL (ref 0.9–3.6)
LYMPHOCYTES NFR BLD: 17 % (ref 21–52)
MCH RBC QN AUTO: 28.5 PG (ref 24–34)
MCHC RBC AUTO-ENTMCNC: 32 G/DL (ref 31–37)
MCV RBC AUTO: 89.1 FL (ref 74–97)
MONOCYTES # BLD: 0.5 K/UL (ref 0.05–1.2)
MONOCYTES NFR BLD: 6 % (ref 3–10)
NEUTS SEG # BLD: 6.4 K/UL (ref 1.8–8)
NEUTS SEG NFR BLD: 77 % (ref 40–73)
PLATELET # BLD AUTO: 325 K/UL (ref 135–420)
PMV BLD AUTO: 11 FL (ref 9.2–11.8)
POTASSIUM SERPL-SCNC: 4.8 MMOL/L (ref 3.5–5.5)
PROT SERPL-MCNC: 8.1 G/DL (ref 6.4–8.2)
RBC # BLD AUTO: 4.6 M/UL (ref 4.2–5.3)
SODIUM SERPL-SCNC: 140 MMOL/L (ref 136–145)
WBC # BLD AUTO: 8.4 K/UL (ref 4.6–13.2)

## 2018-12-20 PROCEDURE — 80076 HEPATIC FUNCTION PANEL: CPT

## 2018-12-20 PROCEDURE — 96374 THER/PROPH/DIAG INJ IV PUSH: CPT

## 2018-12-20 PROCEDURE — 83690 ASSAY OF LIPASE: CPT

## 2018-12-20 PROCEDURE — 85025 COMPLETE CBC W/AUTO DIFF WBC: CPT

## 2018-12-20 PROCEDURE — 99282 EMERGENCY DEPT VISIT SF MDM: CPT

## 2018-12-20 PROCEDURE — 80048 BASIC METABOLIC PNL TOTAL CA: CPT

## 2018-12-20 PROCEDURE — 74011250636 HC RX REV CODE- 250/636: Performed by: EMERGENCY MEDICINE

## 2018-12-20 PROCEDURE — 96375 TX/PRO/DX INJ NEW DRUG ADDON: CPT

## 2018-12-20 RX ORDER — DIPHENHYDRAMINE HYDROCHLORIDE 50 MG/ML
25 INJECTION, SOLUTION INTRAMUSCULAR; INTRAVENOUS
Status: COMPLETED | OUTPATIENT
Start: 2018-12-20 | End: 2018-12-20

## 2018-12-20 RX ORDER — FENTANYL CITRATE 50 UG/ML
50 INJECTION, SOLUTION INTRAMUSCULAR; INTRAVENOUS
Status: COMPLETED | OUTPATIENT
Start: 2018-12-20 | End: 2018-12-20

## 2018-12-20 RX ORDER — PROCHLORPERAZINE EDISYLATE 5 MG/ML
10 INJECTION INTRAMUSCULAR; INTRAVENOUS
Status: COMPLETED | OUTPATIENT
Start: 2018-12-20 | End: 2018-12-20

## 2018-12-20 RX ADMIN — PROCHLORPERAZINE EDISYLATE 10 MG: 5 INJECTION INTRAMUSCULAR; INTRAVENOUS at 20:01

## 2018-12-20 RX ADMIN — DIPHENHYDRAMINE HYDROCHLORIDE 25 MG: 50 INJECTION, SOLUTION INTRAMUSCULAR; INTRAVENOUS at 20:00

## 2018-12-20 RX ADMIN — FENTANYL CITRATE 50 MCG: 50 INJECTION, SOLUTION INTRAMUSCULAR; INTRAVENOUS at 19:48

## 2018-12-20 NOTE — TELEPHONE ENCOUNTER
Requested Prescriptions     Pending Prescriptions Disp Refills    HYDROcodone-acetaminophen (NORCO) 5-325 mg per tablet 12 Tab 0     Sig: take 1 tablet by mouth every 4 hours if needed     Requesting something for the pain until she gets an appointment with her pain management doctor. . Please advise, thank you.

## 2018-12-21 NOTE — DISCHARGE INSTRUCTIONS

## 2018-12-21 NOTE — ED PROVIDER NOTES
Ronda Cohn is a 62 y.o. Female with h/o chronic abd pain, RA with c/o worsening upper abd pain, bloating for last few days, nausea with no vomiting, diarrhea, blood in stool. No dysuria, hematuria, dec urination. No fcs. Also with recurrent finger swelling. Has been referred to pain management recently as well. Constant severe pressure, cramping in abd. Worse with po intake, palpation. The history is provided by the patient and medical records.         Past Medical History:   Diagnosis Date    Abdominal adhesions     Anemia     Arthritis     all over the body    Asthma     Cocaine abuse ([facility] [address]     Diabetes mellitus     Dyskinesia     bilateral    Essential hypertension     GERD (gastroesophageal reflux disease)     Hypertension     Menopause     Microhematuria     Stool color black        Past Surgical History:   Procedure Laterality Date    HX CHOLECYSTECTOMY  6/28/10    HX COLONOSCOPY      HX ENDOSCOPY      HX HERNIA REPAIR      HX HYSTERECTOMY      Fibroids    HX KNEE REPLACEMENT Left     HX OOPHORECTOMY  12/2000    HX ORTHOPAEDIC Right     ankle- multiple surgeries    HX SMALL BOWEL RESECTION  12/2000    HX SMALL BOWEL RESECTION  02/21/2017    Dr. Janet Winslow           Family History:   Problem Relation Age of Onset    Hypertension Other         parent    Breast Cancer Other 21    Heart Disease Father     Hypertension Father     Diabetes Father     Diabetes Mother     Hypertension Other         sibling    Diabetes Other         parent    Diabetes Sister     Kidney Disease Brother     Diabetes Brother        Social History     Socioeconomic History    Marital status:      Spouse name: Not on file    Number of children: Not on file    Years of education: Not on file    Highest education level: Not on file   Social Needs    Financial resource strain: Not on file    Food insecurity - worry: Not on file    Food insecurity - inability: Not on file    Transportation needs - medical: Not on file   [employer] needs - non-medical: Not on file   Occupational History    Not on file   Tobacco Use    Smoking status: Never Smoker    Smokeless tobacco: Never Used   Substance and Sexual Activity    Alcohol use: No    Drug use: No    Sexual activity: Yes     Partners: Male     Birth control/protection: None   Other Topics Concern    Not on file   Social History Narrative    Not on file         ALLERGIES: Macrodantin [nitrofurantoin macrocrystalline] and Tape [adhesive]    Review of Systems   Constitutional: Negative for fever. HENT: Negative for sore throat and trouble swallowing. Eyes: Negative for visual disturbance. Respiratory: Negative for shortness of breath. Cardiovascular: Negative for chest pain. Gastrointestinal: Positive for abdominal distention and abdominal pain. Endocrine: Negative for polyuria. Genitourinary: Negative for difficulty urinating and dysuria. Musculoskeletal: Negative for gait problem. Skin: Negative for rash. Allergic/Immunologic: Negative for immunocompromised state. Neurological: Negative for syncope. Psychiatric/Behavioral: Positive for sleep disturbance. Vitals:    12/20/18 1658 12/20/18 1815 12/20/18 1830   BP: (!) 164/109 (!) 157/105 (!) 155/102   Pulse: 92     Resp: 16     Temp: 98.1 °F (36.7 °C)     SpO2: 100%     Weight: 74.8 kg (165 lb)     Height: 5' 4\" (1.626 m)              Physical Exam   Constitutional: She is oriented to person, place, and time. She appears well-developed and well-nourished. No distress (appears uncomfortable but not ill). HENT:   Head: Normocephalic and atraumatic. Right Ear: External ear normal.   Left Ear: External ear normal.   Nose: Nose normal.   Mouth/Throat: Uvula is midline, oropharynx is clear and moist and mucous membranes are normal.   Eyes: Conjunctivae are normal. No scleral icterus. Neck: Neck supple.    Cardiovascular: Normal rate, regular rhythm, normal heart sounds and intact distal pulses. Pulmonary/Chest: Effort normal and breath sounds normal.   Abdominal: Soft. She exhibits distension. There is no hepatosplenomegaly. There is generalized tenderness. There is no rigidity and no guarding. Musculoskeletal: She exhibits no edema. Some fingers slightly more edematous but no erythema     Neurological: She is alert and oriented to person, place, and time. Gait normal.   Skin: Skin is warm and dry. Capillary refill takes less than 2 seconds. She is not diaphoretic. Psychiatric: Her behavior is normal.   Nursing note and vitals reviewed. MDM       Procedures      Vitals:  Patient Vitals for the past 12 hrs:   Temp Pulse Resp BP SpO2   12/20/18 1930    140/89    12/20/18 1915    (!) 149/93    12/20/18 1900    (!) 139/94    12/20/18 1845    (!) 143/94    12/20/18 1830    (!) 155/102    12/20/18 1815    (!) 157/105    12/20/18 1658 98.1 °F (36.7 °C) 92 16 (!) 164/109 100 %         Medications ordered:   Medications   fentaNYL citrate (PF) injection 50 mcg (50 mcg IntraVENous Given 12/20/18 1948)   prochlorperazine (COMPAZINE) injection 10 mg (10 mg IntraVENous Given 12/20/18 2001)   diphenhydrAMINE (BENADRYL) injection 25 mg (25 mg IntraVENous Given 12/20/18 2000)         Lab findings:  Recent Results (from the past 12 hour(s))   CBC WITH AUTOMATED DIFF    Collection Time: 12/20/18  5:32 PM   Result Value Ref Range    WBC 8.4 4.6 - 13.2 K/uL    RBC 4.60 4.20 - 5.30 M/uL    HGB 13.1 12.0 - 16.0 g/dL    HCT 41.0 35.0 - 45.0 %    MCV 89.1 74.0 - 97.0 FL    MCH 28.5 24.0 - 34.0 PG    MCHC 32.0 31.0 - 37.0 g/dL    RDW 15.3 (H) 11.6 - 14.5 %    PLATELET 991 494 - 122 K/uL    MPV 11.0 9.2 - 11.8 FL    NEUTROPHILS 77 (H) 40 - 73 %    LYMPHOCYTES 17 (L) 21 - 52 %    MONOCYTES 6 3 - 10 %    EOSINOPHILS 0 0 - 5 %    BASOPHILS 0 0 - 2 %    ABS. NEUTROPHILS 6.4 1.8 - 8.0 K/UL    ABS.  LYMPHOCYTES 1.5 0.9 - 3.6 K/UL    ABS. MONOCYTES 0.5 0.05 - 1.2 K/UL    ABS. EOSINOPHILS 0.0 0.0 - 0.4 K/UL    ABS. BASOPHILS 0.0 0.0 - 0.1 K/UL    DF AUTOMATED     METABOLIC PANEL, BASIC    Collection Time: 12/20/18  5:32 PM   Result Value Ref Range    Sodium 140 136 - 145 mmol/L    Potassium 4.8 3.5 - 5.5 mmol/L    Chloride 106 100 - 108 mmol/L    CO2 25 21 - 32 mmol/L    Anion gap 9 3.0 - 18 mmol/L    Glucose 104 (H) 74 - 99 mg/dL    BUN 19 (H) 7.0 - 18 MG/DL    Creatinine 1.11 0.6 - 1.3 MG/DL    BUN/Creatinine ratio 17 12 - 20      GFR est AA >60 >60 ml/min/1.73m2    GFR est non-AA 50 (L) >60 ml/min/1.73m2    Calcium 9.5 8.5 - 10.1 MG/DL   HEPATIC FUNCTION PANEL    Collection Time: 12/20/18  5:32 PM   Result Value Ref Range    Protein, total 8.1 6.4 - 8.2 g/dL    Albumin 3.8 3.4 - 5.0 g/dL    Globulin 4.3 (H) 2.0 - 4.0 g/dL    A-G Ratio 0.9 0.8 - 1.7      Bilirubin, total 0.2 0.2 - 1.0 MG/DL    Bilirubin, direct <0.1 0.0 - 0.2 MG/DL    Alk. phosphatase 127 (H) 45 - 117 U/L    AST (SGOT) 15 15 - 37 U/L    ALT (SGPT) 21 13 - 56 U/L   LIPASE    Collection Time: 12/20/18  5:32 PM   Result Value Ref Range    Lipase 259 73 - 393 U/L       EKG interpretation by ED Physician:      X-Ray, CT or other radiology findings or impressions:  No orders to display       Progress notes, Consult notes or additional Procedure notes:   No sig lab abnl. Doubt need for imaging. Pt given meds here to make more comfortable  I have discussed with patient and/or family/sig other the results, interpretation of any imaging if performed, suspected diagnosis and treatment plan to include instructions regarding the diagnoses listed to which understanding was expressed with all questions answered      Reevaluation of patient:   stable    Disposition:  Diagnosis:   1.  Abdominal pain, chronic, epigastric        Disposition: home    Follow-up Information     Follow up With Specialties Details Why Contact Info    Bernard García MD Family Practice, Internal Medicine Schedule an appointment as soon as possible for a visit  [address]  [address] [place] [address]  [phone]                 Medication List      ASK your doctor about these medications    albuterol 90 mcg/actuation inhaler  Commonly known as:  PROVENTIL HFA, VENTOLIN HFA, PROAIR HFA  Take 1 Puff by inhalation every six (6) hours as needed for Wheezing. ALLERGY RELIEF (CETIRIZINE) 10 mg tablet  Generic drug:  cetirizine     amLODIPine 2.5 mg tablet  Commonly known as:  NORVASC  take 1 tablet by mouth once daily NIGHTLY     atorvastatin 40 mg tablet  Commonly known as:  LIPITOR  Take 1 Tab by mouth nightly. bisoprolol-hydroCHLOROthiazide 2.5-6.25 mg per tablet  Commonly known as:  ZIAC  Take 1 Tab by mouth daily. carbidopa-levodopa  mg per tablet  Commonly known as:  SINEMET     dapagliflozin 10 mg Tab tablet  Commonly known as:  FARXIGA  Take 1 Tab by mouth daily. Indications: type 2 diabetes mellitus     doxepin 10 mg capsule  Commonly known as:  SINEquan  Take 1 Cap by mouth nightly. folic acid 1 mg tablet  Commonly known as:  FOLVITE     glucose blood VI test strips strip  Commonly known as:  ONETOUCH ULTRA TEST  Check blood glucose as directed     HYDROcodone-acetaminophen 5-325 mg per tablet  Commonly known as:  NORCO  take 1 tablet by mouth every 4 hours if needed     ibuprofen 800 mg tablet  Commonly known as:  MOTRIN  Take 1 Tab by mouth every eight (8) hours as needed for Pain.     magnesium citrate solution  Take 1/3 of bottle every 8 hours until finished or until you have a bowel movement. metFORMIN 500 mg Tg24 24 hour tablet  Commonly known as:  GLUMETZA ER  take 1 tablet by mouth twice a day     methotrexate 2.5 mg tablet  Commonly known as:  RHEUMATREX     omeprazole 10 mg capsule  Commonly known as:  PRILOSEC  Take 1 Cap by mouth daily.      ondansetron 4 mg disintegrating tablet  Commonly known as:  ZOFRAN ODT  Take 1 Tab by mouth every eight (8) hours as needed for Nausea. PLAQUENIL 200 mg tablet  Generic drug:  hydroxychloroquine     polyethylene glycol 17 gram/dose powder  Commonly known as:  MIRALAX  Take 17 g by mouth two (2) times a day. 1 capful with 8 oz of water daily     prednisoLONE 5 mg Tab  Take 1 tab daily for 5 days     senna 8.6 mg tablet  Commonly known as:  SENOKOT  Take 1 Tab by mouth daily.      sulfaSALAzine 500 mg tablet  Commonly known as:  AZULFIDINE

## 2018-12-21 NOTE — TELEPHONE ENCOUNTER
Please see message below. Patient requesting a refill. Keep future appointment? Or double book at a sooner date? Please advise, thank you.      Last Appt: 12/06/18  Next Appt: 01/08/19

## 2018-12-24 RX ORDER — HYDROCODONE BITARTRATE AND ACETAMINOPHEN 5; 325 MG/1; MG/1
TABLET ORAL
Qty: 12 TAB | Refills: 0 | OUTPATIENT
Start: 2018-12-24

## 2018-12-24 NOTE — TELEPHONE ENCOUNTER
Spoke with patient using 2 patient identifiers. Patient made aware that an appointment is required in order to refill controlled substances. Patient is aware of upcoming appointment 01/08/2019 and stated she will hold onto appointment at that date. Patient verbalized understanding. No further questions asked.

## 2018-12-26 ENCOUNTER — ANESTHESIA EVENT (OUTPATIENT)
Dept: SURGERY | Age: 58
End: 2018-12-26
Payer: COMMERCIAL

## 2018-12-26 RX ORDER — PREDNISONE 5 MG/1
10 TABLET ORAL 2 TIMES DAILY
COMMUNITY
End: 2019-03-15

## 2018-12-26 NOTE — PERIOP NOTES
PAT - SURGICAL PRE-ADMISSION INSTRUCTIONS    NAME:  Pasha Ba                                                          TODAY'S DATE:  12/26/2018    SURGERY DATE:  12/27/2018                                  SURGERY ARRIVAL TIME:   TBD    1. Do NOT eat or drink anything, including candy or gum, after MIDNIGHT on 12/26/18 , unless you have specific instructions from your Surgeon or Anesthesia Provider to do so. 2. No smoking on the day of surgery. 3. No alcohol 24 hours prior to the day of surgery. 4. No recreational drugs for one week prior to the day of surgery. 5. Leave all valuables, including money/purse, at home. 6. Remove all jewelry, nail polish, makeup (including mascara); no lotions, powders, deodorant, or perfume/cologne/after shave. 7. Glasses/Contact lenses and Dentures may be worn to the hospital.  They will be removed prior to surgery. 8. Call your doctor if symptoms of a cold or illness develop within 24 ours prior to surgery. 9. AN ADULT MUST DRIVE YOU HOME AFTER OUTPATIENT SURGERY. 10. If you are having an OUTPATIENT procedure, please make arrangements for a responsible adult to be with you for 24 hours after your surgery. 11. If you are admitted to the hospital, you will be assigned to a bed after surgery is complete. Normally a family member will not be able to see you until you are in your assigned bed. 15. Family is encouraged to accompany you to the hospital.  We ask visitors in the treatment area to be limited to ONE person at a time to ensure patient privacy. EXCEPTIONS WILL BE MADE AS NEEDED. 15. Children under 12 are discouraged from entering the treatment area and need to be supervised by an adult when in the waiting room. Special Instructions: Take these medications the morning of surgery with a sip of water:  BP medication, HOLD oral diabetic medication on the MORNING OF surgery. , HOLD metformin/glucophage dose starting the EVENING BEFORE the day of surgery. Patient Prep:    shower with anti-bacterial soap    These surgical instructions were reviewed with Kina Nam during the PAT phone call. Directions: On the morning of surgery, please go to the [address] [facility]. Enter the building from the [facility] entrance, 1st floor (next to the Emergency Room entrance). Take the elevator to the 2nd floor. Sign in at the Registration Desk.     If you have any questions and/or concerns, please do not hesitate to call:  (Prior to the day of surgery)  [facility] unit:  [phone]  (Day of surgery)  [facility] unit:  [phone]

## 2018-12-27 ENCOUNTER — HOSPITAL ENCOUNTER (OUTPATIENT)
Age: 58
Setting detail: OUTPATIENT SURGERY
Discharge: HOME | End: 2018-12-27
Attending: SURGERY | Admitting: SURGERY
Payer: COMMERCIAL

## 2018-12-27 ENCOUNTER — ANESTHESIA (OUTPATIENT)
Dept: SURGERY | Age: 58
End: 2018-12-27
Payer: COMMERCIAL

## 2018-12-27 VITALS
HEART RATE: 79 BPM | HEIGHT: 64 IN | BODY MASS INDEX: 27.31 KG/M2 | OXYGEN SATURATION: 99 % | DIASTOLIC BLOOD PRESSURE: 88 MMHG | TEMPERATURE: 98.5 F | WEIGHT: 160 LBS | SYSTOLIC BLOOD PRESSURE: 128 MMHG | RESPIRATION RATE: 16 BRPM

## 2018-12-27 DIAGNOSIS — R22.42 MASS OF LEFT THIGH: Primary | ICD-10-CM

## 2018-12-27 LAB
AMPHET UR QL SCN: NEGATIVE
BARBITURATES UR QL SCN: NEGATIVE
BENZODIAZ UR QL: NEGATIVE
CANNABINOIDS UR QL SCN: NEGATIVE
COCAINE UR QL SCN: POSITIVE
GLUCOSE BLD STRIP.AUTO-MCNC: 93 MG/DL (ref 70–110)
HDSCOM,HDSCOM: ABNORMAL
METHADONE UR QL: NEGATIVE
OPIATES UR QL: NEGATIVE
PCP UR QL: NEGATIVE

## 2018-12-27 PROCEDURE — 76060000032 HC ANESTHESIA 0.5 TO 1 HR: Performed by: SURGERY

## 2018-12-27 PROCEDURE — 77030018836 HC SOL IRR NACL ICUM -A: Performed by: SURGERY

## 2018-12-27 PROCEDURE — 74011250636 HC RX REV CODE- 250/636: Performed by: NURSE ANESTHETIST, CERTIFIED REGISTERED

## 2018-12-27 PROCEDURE — 74011250637 HC RX REV CODE- 250/637: Performed by: NURSE ANESTHETIST, CERTIFIED REGISTERED

## 2018-12-27 PROCEDURE — 76010000138 HC OR TIME 0.5 TO 1 HR: Performed by: SURGERY

## 2018-12-27 PROCEDURE — 77030031139 HC SUT VCRL2 J&J -A: Performed by: SURGERY

## 2018-12-27 PROCEDURE — 80307 DRUG TEST PRSMV CHEM ANLYZR: CPT

## 2018-12-27 PROCEDURE — 74011000250 HC RX REV CODE- 250: Performed by: SURGERY

## 2018-12-27 PROCEDURE — 74011250636 HC RX REV CODE- 250/636

## 2018-12-27 PROCEDURE — 77030002933 HC SUT MCRYL J&J -A: Performed by: SURGERY

## 2018-12-27 PROCEDURE — 82962 GLUCOSE BLOOD TEST: CPT

## 2018-12-27 PROCEDURE — 74011000272 HC RX REV CODE- 272: Performed by: SURGERY

## 2018-12-27 PROCEDURE — 88304 TISSUE EXAM BY PATHOLOGIST: CPT

## 2018-12-27 PROCEDURE — 76210000021 HC REC RM PH II 0.5 TO 1 HR: Performed by: SURGERY

## 2018-12-27 PROCEDURE — 74011250636 HC RX REV CODE- 250/636: Performed by: SURGERY

## 2018-12-27 RX ORDER — PROPOFOL 10 MG/ML
INJECTION, EMULSION INTRAVENOUS AS NEEDED
Status: DISCONTINUED | OUTPATIENT
Start: 2018-12-27 | End: 2018-12-27 | Stop reason: HOSPADM

## 2018-12-27 RX ORDER — SODIUM CHLORIDE, SODIUM LACTATE, POTASSIUM CHLORIDE, CALCIUM CHLORIDE 600; 310; 30; 20 MG/100ML; MG/100ML; MG/100ML; MG/100ML
50 INJECTION, SOLUTION INTRAVENOUS CONTINUOUS
Status: DISCONTINUED | OUTPATIENT
Start: 2018-12-27 | End: 2018-12-27 | Stop reason: HOSPADM

## 2018-12-27 RX ORDER — MIDAZOLAM HYDROCHLORIDE 1 MG/ML
INJECTION, SOLUTION INTRAMUSCULAR; INTRAVENOUS AS NEEDED
Status: DISCONTINUED | OUTPATIENT
Start: 2018-12-27 | End: 2018-12-27 | Stop reason: HOSPADM

## 2018-12-27 RX ORDER — FENTANYL CITRATE 50 UG/ML
INJECTION, SOLUTION INTRAMUSCULAR; INTRAVENOUS AS NEEDED
Status: DISCONTINUED | OUTPATIENT
Start: 2018-12-27 | End: 2018-12-27 | Stop reason: HOSPADM

## 2018-12-27 RX ORDER — LIDOCAINE HYDROCHLORIDE 20 MG/ML
INJECTION, SOLUTION EPIDURAL; INFILTRATION; INTRACAUDAL; PERINEURAL AS NEEDED
Status: DISCONTINUED | OUTPATIENT
Start: 2018-12-27 | End: 2018-12-27 | Stop reason: HOSPADM

## 2018-12-27 RX ORDER — FAMOTIDINE 20 MG/1
20 TABLET, FILM COATED ORAL ONCE
Status: COMPLETED | OUTPATIENT
Start: 2018-12-27 | End: 2018-12-27

## 2018-12-27 RX ORDER — LIDOCAINE HYDROCHLORIDE 10 MG/ML
0.1 INJECTION, SOLUTION EPIDURAL; INFILTRATION; INTRACAUDAL; PERINEURAL AS NEEDED
Status: DISCONTINUED | OUTPATIENT
Start: 2018-12-27 | End: 2018-12-27 | Stop reason: HOSPADM

## 2018-12-27 RX ORDER — DEXTROSE MONOHYDRATE 25 G/50ML
25-50 INJECTION, SOLUTION INTRAVENOUS AS NEEDED
Status: DISCONTINUED | OUTPATIENT
Start: 2018-12-27 | End: 2018-12-27 | Stop reason: HOSPADM

## 2018-12-27 RX ORDER — CEFAZOLIN SODIUM 2 G/50ML
2 SOLUTION INTRAVENOUS
Status: COMPLETED | OUTPATIENT
Start: 2018-12-27 | End: 2018-12-27

## 2018-12-27 RX ORDER — ONDANSETRON 2 MG/ML
INJECTION INTRAMUSCULAR; INTRAVENOUS AS NEEDED
Status: DISCONTINUED | OUTPATIENT
Start: 2018-12-27 | End: 2018-12-27 | Stop reason: HOSPADM

## 2018-12-27 RX ORDER — PROPOFOL 10 MG/ML
INJECTION, EMULSION INTRAVENOUS
Status: DISCONTINUED | OUTPATIENT
Start: 2018-12-27 | End: 2018-12-27 | Stop reason: HOSPADM

## 2018-12-27 RX ORDER — OXYCODONE AND ACETAMINOPHEN 5; 325 MG/1; MG/1
2 TABLET ORAL
Status: DISCONTINUED | OUTPATIENT
Start: 2018-12-27 | End: 2018-12-27 | Stop reason: HOSPADM

## 2018-12-27 RX ORDER — OXYCODONE AND ACETAMINOPHEN 5; 325 MG/1; MG/1
1 TABLET ORAL
Qty: 12 TAB | Refills: 0 | Status: SHIPPED | OUTPATIENT
Start: 2018-12-27 | End: 2019-01-08

## 2018-12-27 RX ORDER — BUPIVACAINE HYDROCHLORIDE AND EPINEPHRINE 2.5; 5 MG/ML; UG/ML
INJECTION, SOLUTION EPIDURAL; INFILTRATION; INTRACAUDAL; PERINEURAL AS NEEDED
Status: DISCONTINUED | OUTPATIENT
Start: 2018-12-27 | End: 2018-12-27 | Stop reason: HOSPADM

## 2018-12-27 RX ORDER — MAGNESIUM SULFATE 100 %
4 CRYSTALS MISCELLANEOUS AS NEEDED
Status: DISCONTINUED | OUTPATIENT
Start: 2018-12-27 | End: 2018-12-27 | Stop reason: HOSPADM

## 2018-12-27 RX ORDER — LIDOCAINE HYDROCHLORIDE AND EPINEPHRINE 10; 10 MG/ML; UG/ML
INJECTION, SOLUTION INFILTRATION; PERINEURAL AS NEEDED
Status: DISCONTINUED | OUTPATIENT
Start: 2018-12-27 | End: 2018-12-27 | Stop reason: HOSPADM

## 2018-12-27 RX ORDER — INSULIN LISPRO 100 [IU]/ML
INJECTION, SOLUTION INTRAVENOUS; SUBCUTANEOUS ONCE
Status: DISCONTINUED | OUTPATIENT
Start: 2018-12-27 | End: 2018-12-27 | Stop reason: HOSPADM

## 2018-12-27 RX ADMIN — SODIUM CHLORIDE, SODIUM LACTATE, POTASSIUM CHLORIDE, AND CALCIUM CHLORIDE 50 ML/HR: 600; 310; 30; 20 INJECTION, SOLUTION INTRAVENOUS at 12:33

## 2018-12-27 RX ADMIN — FENTANYL CITRATE 100 MCG: 50 INJECTION, SOLUTION INTRAMUSCULAR; INTRAVENOUS at 12:41

## 2018-12-27 RX ADMIN — ONDANSETRON 4 MG: 2 INJECTION INTRAMUSCULAR; INTRAVENOUS at 13:12

## 2018-12-27 RX ADMIN — CEFAZOLIN SODIUM 2 G: 2 SOLUTION INTRAVENOUS at 12:42

## 2018-12-27 RX ADMIN — LIDOCAINE HYDROCHLORIDE 20 MG: 20 INJECTION, SOLUTION EPIDURAL; INFILTRATION; INTRACAUDAL; PERINEURAL at 12:50

## 2018-12-27 RX ADMIN — OXYCODONE AND ACETAMINOPHEN 2 TABLET: 5; 325 TABLET ORAL at 13:51

## 2018-12-27 RX ADMIN — FAMOTIDINE 20 MG: 20 TABLET ORAL at 12:12

## 2018-12-27 RX ADMIN — PROPOFOL 75 MCG/KG/MIN: 10 INJECTION, EMULSION INTRAVENOUS at 12:42

## 2018-12-27 RX ADMIN — MIDAZOLAM HYDROCHLORIDE 2 MG: 1 INJECTION, SOLUTION INTRAMUSCULAR; INTRAVENOUS at 12:37

## 2018-12-27 RX ADMIN — PROPOFOL 30 MG: 10 INJECTION, EMULSION INTRAVENOUS at 12:50

## 2018-12-27 NOTE — ANESTHESIA POSTPROCEDURE EVALUATION
Procedure(s):  excision of left thigh, left posterior knee masses.     Anesthesia Post Evaluation      Multimodal analgesia: multimodal analgesia used between 6 hours prior to anesthesia start to PACU discharge  Patient location during evaluation: bedside  Patient participation: complete - patient participated  Level of consciousness: awake  Pain score: 5  Pain management: adequate  Airway patency: patent  Anesthetic complications: no  Cardiovascular status: stable  Respiratory status: acceptable  Hydration status: acceptable  Post anesthesia nausea and vomiting:  controlled      Visit Vitals  /88 (BP Patient Position: At rest)   Pulse 79   Temp 36.9 °C (98.5 °F)   Resp 16   Ht 5' 4\" (1.626 m)   Wt 72.6 kg (160 lb)   SpO2 99%   BMI 27.46 kg/m²

## 2018-12-27 NOTE — PROGRESS NOTES
Date of Surgery Update:  Ramu White was seen and examined. History and physical has been reviewed. The patient has been examined. There have been no significant clinical changes since the completion of the originally dated History and Physical. Will proceed with excision of left thigh and knee masses.      Signed By: Daniel Bello MD     December 27, 2018 12:32 PM

## 2018-12-27 NOTE — ANESTHESIA PREPROCEDURE EVALUATION
Anesthetic History     PONV          Review of Systems / Medical History  Patient summary reviewed and pertinent labs reviewed    Pulmonary            Asthma        Neuro/Psych   Within defined limits      TIA     Cardiovascular    Hypertension              Exercise tolerance: >4 METS     GI/Hepatic/Renal     GERD           Endo/Other    Diabetes    Arthritis     Other Findings              Physical Exam    Airway  Mallampati: II  TM Distance: 4 - 6 cm  Neck ROM: normal range of motion   Mouth opening: Normal     Cardiovascular  Regular rate and rhythm,  S1 and S2 normal,  no murmur, click, rub, or gallop  Rhythm: regular  Rate: normal         Dental    Dentition: Lower dentition intact and Upper dentition intact     Pulmonary  Breath sounds clear to auscultation               Abdominal  GI exam deferred       Other Findings            Anesthetic Plan    ASA: 3  Anesthesia type: MAC          Induction: Intravenous  Anesthetic plan and risks discussed with: Patient

## 2018-12-27 NOTE — PERIOP NOTES
Phase 2 Recovery Summary  Patient arrived to Phase 2 at 1337  Report received from [address] [place] [address] Street:    12/26/18 0951 12/27/18 1202 12/27/18 1335   BP:  (!) 141/95 128/88   Pulse:  71 79   Resp:  16 16   Temp:  98.5 °F (36.9 °C)    SpO2:  100% 99%   Weight: 74.8 kg (165 lb) 72.6 kg (160 lb)    Height: 5' 4\" (1.626 m)         oriented to time, place, person and situation    Lines and Drains  Peripheral Intravenous Line:      Wound  Wound Thigh Left (Active)   DRESSING STATUS Clean, dry, and intact 12/27/2018  1:35 PM   DRESSING TYPE Topical skin adhesive/glue 12/27/2018  1:35 PM   Incision site well approximated? Yes 12/27/2018  1:35 PM   Number of days: 0       Wound Knee Left (Active)   DRESSING STATUS Clean, dry, and intact 12/27/2018  1:35 PM   DRESSING TYPE Topical skin adhesive/glue 12/27/2018  1:35 PM   Incision site well approximated?  Yes 12/27/2018  1:35 PM   Number of days: 0          Patient discharged to  Home with  and mom    Mckenzie Harris RN

## 2018-12-27 NOTE — OP NOTES
[address] REPORT    Maira Roca  MR#: [id number]  : 1960  ACCOUNT #: [de-identified]   DATE OF SERVICE: 2018    SURGEON:  Suzie Solorio MD     ASSISTANT:  Franchesca Bui    PREOPERATIVE DIAGNOSES:  1. Left posterior thigh mass. 2.  Left posterior knee mass. POSTOPERATIVE DIAGNOSES: Same. PROCEDURES PERFORMED:  1. Excision of left posterior thigh mass, size was about 7 x 4 cm, and it was intramuscular below the fascia. 2.  Excision of left posterior knee mass about 2 x 1 cm in the subcutaneous tissue. ANESTHESIA:  Local plus sedation. COMPLICATIONS:  None. SPECIMENS REMOVED:  Thigh and knee masses. ESTIMATED BLOOD LOSS:  Minimal.    IMPLANTS:  None. DETAILS OF PROCEDURE:  The patient was brought to the operating room, was placed in prone position. Scrubbing and draping of the posterior thigh and left lower extremity were done in the usual manner. A timeout was performed. Anesthesia started giving the patient sedation. I used 1% lidocaine to infiltrate around the mass in the left posterior knee. This was infiltrated with 1% lidocaine and then a skin incision was performed about 2.5 cm, deepened through the skin and subcutaneous tissue. Flaps were raised. The mass was superficial.  It was dissected and completely excised. Hemostasis was secured. The area was packed and then attention was made to the larger posterior thigh mass. Also 1% was used to infiltrate the skin. About 6 cm long skin incision horizontally was performed, deepened through skin and subcutaneous tissue. I could not at this point feel the mass itself, so I opened the fascia of the muscle and it was intramuscular, and we dissected the mass completely and we sent it for permanent pathology. Hemostasis was secured.   The fascia was closed with a running #3-0 Vicryl in 2 layers and then the skin and subcutaneous tissue of both wounds were closed with 3-0 Vicryl and 4-0 Monocryl.       MD Tena Martinez / UDAY  D: 12/27/2018 13:19     T: 12/27/2018 13:34  JOB #: [id number]

## 2018-12-27 NOTE — BRIEF OP NOTE
BRIEF OPERATIVE NOTE    Date of Procedure: 12/27/2018   Preoperative Diagnosis: mass left thigh and left knee  r22.42  m25.862  Postoperative Diagnosis: mass left thigh and left knee  r22.42  m25.862    Procedure(s):  excision of left thigh, left posterior knee masses  Surgeon(s) and Role:     * Lela Gonzalez MD - Primary. Surgical Staff:  Circ-1: Toby Antunez RN  Circ-2: Stewart Walker RN  Scrub Tech-1: Severo Johnson  Surg Asst-1: Jes Velazquez  Event Time In Time Out   Incision Start 1250    Incision Close       Anesthesia: MAC   Estimated Blood Loss: Minimal.  Specimens:   ID Type Source Tests Collected by Time Destination   1 : left knee mass Preservative   Lela Gonzalez MD 12/27/2018 1305 Pathology   2 : left thigh mass Preservative   Lela Gonzalez MD 12/27/2018 1306 Pathology      Findings: Large posterior thigh mass. Left posterior neck mass. Complications: None.    Implants: * No implants in log *

## 2018-12-27 NOTE — DISCHARGE INSTRUCTIONS
Discharge Instructions Following Surgery    Patient: Teo Valentine MRN: [id number]  SSN: [id number]    YOB: 1960  Age: 62 y.o. Sex: female      Activity  · As tolerated, walking encourage, stairs are okay. · Avoid strenuous activities - no lifting anything heavier than 15 pounds till seen in the clinic. · You may shower at home after 48 hours. Diet  · Regular diet after nausea from the anesthetic has passed. Pain  · Take pain medication as directed by your doctor. Please add Aleve or Ibuprofen as needed (If no contraindications for these types of medications). ·  Call your doctor if pain is NOT relieved by medication. Dressing Care  · There is glue on the wounds. No need for any dressing care. After Anesthesia  · For the first 24 hours: DO NOT Drive, Drink alcoholic beverages, or Make important decisions. · Be aware of dizziness following anesthesia and while taking pain medication. Call your doctor if  · Excessive bleeding that does not stop after holding mild pressure over the area. · Temperature of 101 degrees F or above. · Redness,excessive swelling or bruising, and/or green or yellow, smelly discharge from incision. · If nausea and vomiting continues. Appointment date/time Follow-Up Phone Calls    · Call the office at [phone] to make your follow-up appointment in 2 weeks after the surgery (if not already set up) . Dr. Mathews Car cell phone number is [phone]. Please call me if you have any concerns or questions. DISCHARGE SUMMARY from Nurse    PATIENT INSTRUCTIONS:    After general anesthesia or intravenous sedation, for 24 hours or while taking prescription Narcotics:  · Limit your activities  · Do not drive and operate hazardous machinery  · Do not make important personal or business decisions  · Do  not drink alcoholic beverages  · If you have not urinated within 8 hours after discharge, please contact your surgeon on call.     Report the following to your surgeon:  · Excessive pain, swelling, redness or odor of or around the surgical area  · Temperature over 100.5  · Nausea and vomiting lasting longer than 4 hours or if unable to take medications  · Any signs of decreased circulation or nerve impairment to extremity: change in color, persistent  numbness, tingling, coldness or increase pain  · Any questions    What to do at Home:      These are general instructions for a healthy lifestyle:    No smoking/ No tobacco products/ Avoid exposure to second hand smoke  Surgeon General's Warning:  Quitting smoking now greatly reduces serious risk to your health. Obesity, smoking, and sedentary lifestyle greatly increases your risk for illness    A healthy diet, regular physical exercise & weight monitoring are important for maintaining a healthy lifestyle    You may be retaining fluid if you have a history of heart failure or if you experience any of the following symptoms:  Weight gain of 3 pounds or more overnight or 5 pounds in a week, increased swelling in our hands or feet or shortness of breath while lying flat in bed. Please call your doctor as soon as you notice any of these symptoms; do not wait until your next office visit. Recognize signs and symptoms of STROKE:    F-face looks uneven    A-arms unable to move or move unevenly    S-speech slurred or non-existent    T-time-call 911 as soon as signs and symptoms begin-DO NOT go       Back to bed or wait to see if you get better-TIME IS BRAIN. Warning Signs of HEART ATTACK     Call 911 if you have these symptoms:   Chest discomfort. Most heart attacks involve discomfort in the center of the chest that lasts more than a few minutes, or that goes away and comes back. It can feel like uncomfortable pressure, squeezing, fullness, or pain.  Discomfort in other areas of the upper body. Symptoms can include pain or discomfort in one or both arms, the back, neck, jaw, or stomach.    Shortness of breath with or without chest discomfort.  Other signs may include breaking out in a cold sweat, nausea, or lightheadedness. Don't wait more than five minutes to call 911 - MINUTES MATTER! Fast action can save your life. Calling 911 is almost always the fastest way to get lifesaving treatment. Emergency Medical Services staff can begin treatment when they arrive -- up to an hour sooner than if someone gets to the hospital by car. The discharge information has been reviewed with the patient. The patient verbalized understanding. Discharge medications reviewed with the patient and appropriate educational materials and side effects teaching were provided. ___________________________________________________________________________________________________________________________________  Patient armband removed and given to patient to take home.   Patient was informed of the privacy risks if armband lost or stolen\

## 2019-01-02 ENCOUNTER — TELEPHONE (OUTPATIENT)
Dept: FAMILY MEDICINE CLINIC | Age: 59
End: 2019-01-02

## 2019-01-02 NOTE — TELEPHONE ENCOUNTER
Patients appointment is next week. The reason for the appointment is to get a letter to return to work. She wants to go to work now and is requesting a letter to return to work earlier than that. Please advise, thank you.

## 2019-01-03 ENCOUNTER — OFFICE VISIT (OUTPATIENT)
Dept: FAMILY MEDICINE CLINIC | Age: 59
End: 2019-01-03

## 2019-01-03 VITALS
DIASTOLIC BLOOD PRESSURE: 100 MMHG | BODY MASS INDEX: 28.41 KG/M2 | OXYGEN SATURATION: 100 % | HEIGHT: 64 IN | WEIGHT: 166.4 LBS | SYSTOLIC BLOOD PRESSURE: 144 MMHG | RESPIRATION RATE: 18 BRPM | TEMPERATURE: 98.5 F | HEART RATE: 88 BPM

## 2019-01-03 DIAGNOSIS — J06.9 VIRAL UPPER RESPIRATORY ILLNESS: Primary | ICD-10-CM

## 2019-01-03 DIAGNOSIS — I10 ESSENTIAL HYPERTENSION: ICD-10-CM

## 2019-01-03 DIAGNOSIS — E11.65 TYPE 2 DIABETES MELLITUS WITH HYPERGLYCEMIA, WITHOUT LONG-TERM CURRENT USE OF INSULIN (HCC): ICD-10-CM

## 2019-01-03 RX ORDER — PROMETHAZINE HYDROCHLORIDE AND CODEINE PHOSPHATE 6.25; 1 MG/5ML; MG/5ML
5 SOLUTION ORAL
Qty: 118 ML | Refills: 0 | Status: SHIPPED | OUTPATIENT
Start: 2019-01-03 | End: 2019-06-26 | Stop reason: ALTCHOICE

## 2019-01-03 NOTE — LETTER
NOTIFICATION RETURN TO WORK / SCHOOL 
 
1/3/2019 2:52 PM 
 
Ms. Beckie Aguiar [address] [phone] To Whom It May Concern: 
 
Beckie Aguiar is currently under the care of [facility] Carpio. She will return to work/school full duty on: 1/7/19 If there are questions or concerns please have the patient contact our office. Sincerely, Laure Moctezuma MD

## 2019-01-03 NOTE — PROGRESS NOTES
Beckie Aguiar is a 62 y.o.  female and presents with    Chief Complaint   Patient presents with    Cough     Subjective:  Cough  Patient complains of chills, nasal congestion, productive cough with sputum described as clear and sore throat. Symptoms began 1 day ago. The cough is without wheezing, dyspnea or hemoptysis and is aggravated by cold air Associated symptoms include:heartburn. Patient does not have new pets. Patient does have a history of asthma. Patient does have a history of environmental allergens. Patient does not have recent travel. Patient does not have a history of smoking. Patient  has previous Chest X-ray. Patient has had a PPD done. for f/u rheumatoid arthritis and for form completion for short term disability.  She c/o joint pain and has appointment scheduled with rheumatologist dr. Kitty Paez is currently taking prednisone 10 mg daily and  has elevated glucose to 150.  She has had swelling in her hands which has improved with prednisone.  sulfasalazine has caused nausea     She has nausea and vomiting; she has been taking zofran     Hypertension  Patient is here for follow-up of hypertension.  She indicates that she is feeling well and denies any symptoms referable to her hypertension. She is exercising and is adherent to low salt diet.  Blood pressure is well controlled at home.  Use of agents associated with hypertension: none    Patient Active Problem List   Diagnosis Code    Osteoarthritis of left knee M17.12    Hypertension I10    Hyperlipidemia E78.5    GERD (gastroesophageal reflux disease) K21.9    Asthma J45.909    Type [facility] mellitus [facility] E11.9    SBO (small bowel obstruction) ([facility]) K56.609    Chronic abdominal pain R10.9, G89.29    Post-operative pain G89.18    Chest pain R07.9    Essential hypertension, benign I10    Sural neuritis G57.80    Type 2 diabetes mellitus with nephropathy ([facility]) E11.21    Non-intractable cyclical vomiting with nausea G43. A0    Bowel obstruction ([facility] K56.609     Patient Active Problem List    Diagnosis Date Noted    Bowel obstruction ([facility] 08/23/2018    Non-intractable cyclical vomiting with nausea 01/24/2018    Type 2 diabetes mellitus with nephropathy ([facility] 01/19/2018    Chronic abdominal pain 03/14/2017    Post-operative pain 03/14/2017    SBO (small bowel obstruction) ([facility]) 02/21/2017    Osteoarthritis of left knee 06/27/2016    Hypertension 06/27/2016    Hyperlipidemia 06/27/2016    GERD (gastroesophageal reflux disease) 06/27/2016    Asthma 06/27/2016    Type 2 diabetes mellitus ([facility] 06/27/2016    Sural neuritis 06/23/2014    Chest pain 04/20/2014    Essential hypertension, benign 02/27/2012     Current Outpatient Medications   Medication Sig Dispense Refill    oxyCODONE-acetaminophen (PERCOCET) 5-325 mg per tablet Take 1 Tab by mouth every four (4) hours as needed for Pain. Max Daily Amount: 6 Tabs. 12 Tab 0    predniSONE (DELTASONE) 5 mg tablet Take 10 mg by mouth two (2) times a day. Alternate with 5 mg every other week      amLODIPine (NORVASC) 2.5 mg tablet take 1 tablet by mouth once daily NIGHTLY 90 Tab 1    albuterol (PROVENTIL HFA, VENTOLIN HFA, PROAIR HFA) 90 mcg/actuation inhaler Take 1 Puff by inhalation every six (6) hours as needed for Wheezing. 1 Inhaler 0    folic acid (FOLVITE) 1 mg tablet Take  by mouth daily.  hydroxychloroquine (PLAQUENIL) 200 mg tablet Take 200 mg by mouth daily.  methotrexate (RHEUMATREX) 2.5 mg tablet Take 5 mg by mouth every Tuesday.  sulfaSALAzine (AZULFIDINE) 500 mg tablet Take 500 mg by mouth two (2) times a day.  metFORMIN (GLUMETZA ER) 500 mg TG24 24 hour tablet take 1 tablet by mouth twice a day 60 Tab 2    ondansetron (ZOFRAN ODT) 4 mg disintegrating tablet Take 1 Tab by mouth every eight (8) hours as needed for Nausea. 30 Tab 0    bisoprolol-hydroCHLOROthiazide (ZIAC) 2.5-6.25 mg per tablet Take 1 Tab by mouth daily.  80 Tab 3    ALLERGY RELIEF, CETIRIZINE, 10 mg tablet take 1 tablet by mouth once daily  0    dapagliflozin (FARXIGA) 10 mg tab tablet Take 1 Tab by mouth daily.  Indications: type 2 diabetes mellitus 90 Tab 3    glucose blood VI test strips (ONETOUCH ULTRA TEST) strip Check blood glucose as directed 100 Strip 1     Allergies   Allergen Reactions    Macrodantin [Nitrofurantoin Macrocrystalline] Itching and Other (comments)    Tape [Adhesive] Other (comments)     Paper tape-- feels like burning skin  Burned skin when had wound vac     Past Medical History:   Diagnosis Date    Abdominal adhesions     Anemia     Asthma     Diabetes mellitus     Dyskinesia     bilateral    Essential hypertension     GERD (gastroesophageal reflux disease)     Hypertension     Menopause     Microhematuria     Rheumatoid arteritis 2018    Stool color black      Past Surgical History:   Procedure Laterality Date    HX CHOLECYSTECTOMY  6/28/10    HX COLONOSCOPY      HX ENDOSCOPY      HX HERNIA REPAIR      HX HYSTERECTOMY      Fibroids    HX KNEE REPLACEMENT Left     HX KNEE REPLACEMENT Right 06/2018    HX OOPHORECTOMY  12/2000    HX ORTHOPAEDIC Right     ankle- multiple surgeries    HX SMALL BOWEL RESECTION  12/2000    HX SMALL BOWEL RESECTION  02/21/2017    Dr. Comfort Wray       Family History   Problem Relation Age of Onset    Hypertension Other         parent    Breast Cancer Other 21    Heart Disease Father     Hypertension Father     Diabetes Father     Diabetes Mother     Hypertension Other         sibling    Diabetes Other         parent    Diabetes Sister     Kidney Disease Brother     Diabetes Brother      Social History     Tobacco Use    Smoking status: Never Smoker    Smokeless tobacco: Never Used   Substance Use Topics    Alcohol use: No       ROS   General ROS: negative for - chills or fever  Psychological ROS: negative for - anxiety or depression  Ophthalmic ROS: positive for - uses glasses  ENT ROS: negative for - headaches  Endocrine ROS: positive for - polydipsia/polyuria  Cardiovascular ROS: no chest pain or dyspnea on exertion  Gastrointestinal ROS: positive for - abdominal pain  negative for - constipation or heartburn  Genito-Urinary ROS: no dysuria, trouble voiding, or hematuria  Neurological ROS: no TIA or stroke symptoms  Dermatological ROS: negative for - rash or skin lesion changes    All other systems reviewed and are negative. Objective:  Vitals:    01/03/19 0836 01/03/19 0841   BP: (!) 182/107 (!) 144/100   Pulse: 88    Resp: 18    Temp: 98.5 °F (36.9 °C)    TempSrc: Oral    SpO2: 100%    Weight: 166 lb 6.4 oz (75.5 kg)    Height: 5' 4\" (1.626 m)    PainSc:   0 - No pain      Constitutional: She is oriented to person, place, and time. She appears well-developed and well-nourished. She appears calm   HENT:   Head: Normocephalic and atraumatic. Mouth/Throat: Oropharynx is clear and moist.   Eyes: conjunctival erythema  Neck: Neck supple. No tracheal deviation present. Cardiovascular: Regular rhythm.    No murmur heard. Pulmonary/Chest: Effort normal and breath sounds normal. No respiratory distress. Abdominal: Soft. No tenderness  Musculoskeletal: right knee with pain with motion  Neurological: antalgic gait; No gross neuro deficit    Assessment/Plan:    1. Viral upper respiratory illness  Increase fluid intake; treat symptoms  - promethazine-codeine (PHENERGAN WITH CODEINE) 6.25-10 mg/5 mL syrup; Take 5 mL by mouth four (4) times daily as needed for Cough. Max Daily Amount: 20 mL. Dispense: 118 mL; Refill: 0    2. Essential hypertension  Goal <130/80; continue current treatment      Lab review: no lab studies available for review at time of visit      I have discussed the diagnosis with the patient and the intended plan as seen in the above orders.   The patient has received an after-visit summary and questions were answered concerning future plans.  I have discussed medication side effects and warnings with the patient as well. I have reviewed the plan of care with the patient, accepted their input and they are in agreement with the treatment goals. Follow-up Disposition:  Return if symptoms worsen or fail to improve.

## 2019-01-03 NOTE — TELEPHONE ENCOUNTER
Patient called stating the back to work letter needs to be revised. Please add \"return to full duty\". Patient is planning to  letter in the morning.

## 2019-01-03 NOTE — LETTER
NOTIFICATION RETURN TO WORK  
 
1/3/2019 8:49 AM 
 
Ms. Roberta Way [address] [phone] To Whom It May Concern: 
 
Roberta Way is currently under the care of [address] [place] [address]. She will return to work on: 1/7/2019 If there are questions or concerns please have the patient contact our office. Sincerely, Lucas Knight MD

## 2019-01-03 NOTE — PROGRESS NOTES
Lexi Veras is a 62 y.o. female  Chief Complaint   Patient presents with    Cough     1. Have you been to the ER, urgent care clinic since your last visit? Hospitalized since your last visit? No    2. Have you seen or consulted any other health care providers outside of the [address] [place] [address] since your last visit? Include any pap smears or colon screening.  No

## 2019-01-04 RX ORDER — DAPAGLIFLOZIN 10 MG/1
TABLET, FILM COATED ORAL
Qty: 90 TAB | Refills: 3 | Status: SHIPPED | OUTPATIENT
Start: 2019-01-04 | End: 2020-01-06

## 2019-01-08 ENCOUNTER — OFFICE VISIT (OUTPATIENT)
Dept: FAMILY MEDICINE CLINIC | Age: 59
End: 2019-01-08

## 2019-01-08 VITALS
OXYGEN SATURATION: 98 % | BODY MASS INDEX: 28.17 KG/M2 | TEMPERATURE: 98.2 F | HEIGHT: 64 IN | HEART RATE: 96 BPM | DIASTOLIC BLOOD PRESSURE: 95 MMHG | WEIGHT: 165 LBS | SYSTOLIC BLOOD PRESSURE: 142 MMHG | RESPIRATION RATE: 16 BRPM

## 2019-01-08 DIAGNOSIS — J40 BRONCHITIS: Primary | ICD-10-CM

## 2019-01-08 DIAGNOSIS — Z86.018 S/P EXCISION OF LIPOMA: ICD-10-CM

## 2019-01-08 DIAGNOSIS — M05.741 RHEUMATOID ARTHRITIS INVOLVING BOTH HANDS WITH POSITIVE RHEUMATOID FACTOR (HCC): ICD-10-CM

## 2019-01-08 DIAGNOSIS — M05.742 RHEUMATOID ARTHRITIS INVOLVING BOTH HANDS WITH POSITIVE RHEUMATOID FACTOR (HCC): ICD-10-CM

## 2019-01-08 DIAGNOSIS — Z98.890 S/P EXCISION OF LIPOMA: ICD-10-CM

## 2019-01-08 RX ORDER — HYDROCODONE BITARTRATE AND ACETAMINOPHEN 5; 325 MG/1; MG/1
TABLET ORAL
Qty: 12 TAB | Refills: 0 | Status: SHIPPED | OUTPATIENT
Start: 2019-01-08 | End: 2019-07-30

## 2019-01-08 NOTE — PROGRESS NOTES
Maurisio Duarte is a 62 y.o.  female and presents with    Chief Complaint   Patient presents with    Medication Evaluation    Form Completion       Subjective:  Ms. America Young presents for f/u bronchitis after 6 day course. She has la paperwork to take back to the school after missing >3 days. She is s/p lipoma excision and surgical site healing well. She c/o right knee pain and right hip pain. She is treated for rheumatoid arthritis and will f/u with rheumatology and orthopedic surgeon. Patient Active Problem List   Diagnosis Code    Osteoarthritis of left knee M17.12    Hypertension I10    Hyperlipidemia E78.5    GERD (gastroesophageal reflux disease) K21.9    Asthma J45.909    Type 2 diabetes mellitus ([address] E11.9    SBO (small bowel obstruction) ([facility]) K56.609    Chronic abdominal pain R10.9, G89.29    Post-operative pain G89.18    Chest pain R07.9    Essential hypertension, benign I10    Sural neuritis G57.80    Type 2 diabetes mellitus with nephropathy ([facility]) E11.21    Non-intractable cyclical vomiting with nausea G43. A0    Bowel obstruction ([address] K56.609     Patient Active Problem List    Diagnosis Date Noted    Bowel obstruction ([address] 08/23/2018    Non-intractable cyclical vomiting with nausea 01/24/2018    Type 2 diabetes mellitus with nephropathy ([address] 01/19/2018    Chronic abdominal pain 03/14/2017    Post-operative pain 03/14/2017    SBO (small bowel obstruction) ([facility]) 02/21/2017    Osteoarthritis of left knee 06/27/2016    Hypertension 06/27/2016    Hyperlipidemia 06/27/2016    GERD (gastroesophageal reflux disease) 06/27/2016    Asthma 06/27/2016    Type 2 diabetes mellitus ([address] 06/27/2016    Sural neuritis 06/23/2014    Chest pain 04/20/2014    Essential hypertension, benign 02/27/2012     Current Outpatient Medications   Medication Sig Dispense Refill    FARXIGA 10 mg tab tablet take 1 tablet by mouth once daily 90 Tab 3    promethazine-codeine (PHENERGAN WITH CODEINE) 6.25-10 mg/5 mL syrup Take 5 mL by mouth four (4) times daily as needed for Cough. Max Daily Amount: 20 mL. 118 mL 0    oxyCODONE-acetaminophen (PERCOCET) 5-325 mg per tablet Take 1 Tab by mouth every four (4) hours as needed for Pain. Max Daily Amount: 6 Tabs. 12 Tab 0    predniSONE (DELTASONE) 5 mg tablet Take 10 mg by mouth two (2) times a day. Alternate with 5 mg every other week      amLODIPine (NORVASC) 2.5 mg tablet take 1 tablet by mouth once daily NIGHTLY 90 Tab 1    albuterol (PROVENTIL HFA, VENTOLIN HFA, PROAIR HFA) 90 mcg/actuation inhaler Take 1 Puff by inhalation every six (6) hours as needed for Wheezing. 1 Inhaler 0    folic acid (FOLVITE) 1 mg tablet Take  by mouth daily.  hydroxychloroquine (PLAQUENIL) 200 mg tablet Take 200 mg by mouth daily.  methotrexate (RHEUMATREX) 2.5 mg tablet Take 5 mg by mouth every Tuesday.  sulfaSALAzine (AZULFIDINE) 500 mg tablet Take 500 mg by mouth two (2) times a day.  metFORMIN (GLUMETZA ER) 500 mg TG24 24 hour tablet take 1 tablet by mouth twice a day 60 Tab 2    ondansetron (ZOFRAN ODT) 4 mg disintegrating tablet Take 1 Tab by mouth every eight (8) hours as needed for Nausea. 30 Tab 0    bisoprolol-hydroCHLOROthiazide (ZIAC) 2.5-6.25 mg per tablet Take 1 Tab by mouth daily.  90 Tab 3    ALLERGY RELIEF, CETIRIZINE, 10 mg tablet take 1 tablet by mouth once daily  0    glucose blood VI test strips (ONETOUCH ULTRA TEST) strip Check blood glucose as directed 100 Strip 1     Allergies   Allergen Reactions    Macrodantin [Nitrofurantoin Macrocrystalline] Itching and Other (comments)    Tape [Adhesive] Other (comments)     Paper tape-- feels like burning skin  Burned skin when had wound vac     Past Medical History:   Diagnosis Date    Abdominal adhesions     Anemia     Asthma     Diabetes mellitus     Dyskinesia     bilateral    Essential hypertension     GERD (gastroesophageal reflux disease)     Hypertension     Menopause     Microhematuria     Rheumatoid arteritis 2018    Stool color black      Past Surgical History:   Procedure Laterality Date    HX CHOLECYSTECTOMY  6/28/10    HX COLONOSCOPY      HX ENDOSCOPY      HX HERNIA REPAIR      HX HYSTERECTOMY      Fibroids    HX KNEE REPLACEMENT Left     HX KNEE REPLACEMENT Right 06/2018    HX OOPHORECTOMY  12/2000    HX ORTHOPAEDIC Right     ankle- multiple surgeries    HX SMALL BOWEL RESECTION  12/2000    HX SMALL BOWEL RESECTION  02/21/2017    Dr. Comfort Wray       Family History   Problem Relation Age of Onset    Hypertension Other         parent    Breast Cancer Other 21    Heart Disease Father     Hypertension Father     Diabetes Father     Diabetes Mother     Hypertension Other         sibling    Diabetes Other         parent    Diabetes Sister     Kidney Disease Brother     Diabetes Brother      Social History     Tobacco Use    Smoking status: Never Smoker    Smokeless tobacco: Never Used   Substance Use Topics    Alcohol use: No       ROS   General ROS: negative for - chills or fever  Psychological ROS: negative for - anxiety or depression  Ophthalmic ROS: positive for - uses glasses  ENT ROS: negative for - headaches  Endocrine ROS: positive for - polydipsia/polyuria  Respiratory ROS: no cough, shortness of breath, or wheezing  Cardiovascular ROS: no chest pain or dyspnea on exertion  Gastrointestinal ROS: positive for - abdominal pain  negative for - constipation or heartburn  Genito-Urinary ROS: no dysuria, trouble voiding, or hematuria  Neurological ROS: no TIA or stroke symptoms  Dermatological ROS: negative for - rash or skin lesion changes    All other systems reviewed and are negative.       Objective:  Vitals:    01/08/19 1556   BP: (!) 142/95   Pulse: 96   Resp: 16   Temp: 98.2 °F (36.8 °C)   TempSrc: Oral   SpO2: 98%   Weight: 165 lb (74.8 kg)   Height: 5' 4\" (1.626 m) PainSc:   8   PainLoc: Knee     Constitutional: She is oriented to person, place, and time. She appears well-developed and well-nourished. She appears calm   HENT:   Head: Normocephalic and atraumatic. Mouth/Throat: Oropharynx is clear and moist.   Eyes: conjunctival erythema  Neck: Neck supple. No tracheal deviation present. Cardiovascular: Regular rhythm.    No murmur heard. Pulmonary/Chest: Effort normal and breath sounds normal. No respiratory distress. Abdominal: Soft. No tenderness  Musculoskeletal: right knee with pain with motion  Neurological: antalgic gait; No gross neuro deficit    Assessment/Plan:    1. Rheumatoid arthritis involving both hands with positive rheumatoid factor (HCC)  Pain management while awaiting rheumatology evaluation  - HYDROcodone-acetaminophen (NORCO) 5-325 mg per tablet; take 1 tablet by mouth every 4 hours if needed  Dispense: 12 Tab; Refill: 0    2. Bronchitis  Increase water intake    3. S/P excision of lipoma  F/u with surgery      Lab review: no lab studies available for review at time of visit      I have discussed the diagnosis with the patient and the intended plan as seen in the above orders. The patient has received an after-visit summary and questions were answered concerning future plans. I have discussed medication side effects and warnings with the patient as well. I have reviewed the plan of care with the patient, accepted their input and they are in agreement with the treatment goals. Follow-up Disposition:  Return in about 2 months (around 3/8/2019) for medication evaluation.

## 2019-01-08 NOTE — PROGRESS NOTES
Ramila Fraser is a 62 y.o. female  Chief Complaint   Patient presents with    Medication Evaluation     1. Have you been to the ER, urgent care clinic since your last visit? Hospitalized since your last visit? No    2. Have you seen or consulted any other health care providers outside of the [address] [place] [address] since your last visit? Include any pap smears or colon screening.  No

## 2019-01-09 ENCOUNTER — OFFICE VISIT (OUTPATIENT)
Dept: SURGERY | Age: 59
End: 2019-01-09

## 2019-01-09 VITALS
WEIGHT: 165 LBS | SYSTOLIC BLOOD PRESSURE: 146 MMHG | HEIGHT: 64 IN | OXYGEN SATURATION: 99 % | HEART RATE: 91 BPM | BODY MASS INDEX: 28.17 KG/M2 | TEMPERATURE: 99.6 F | DIASTOLIC BLOOD PRESSURE: 88 MMHG

## 2019-01-09 DIAGNOSIS — Z09 POSTOPERATIVE EXAMINATION: Primary | ICD-10-CM

## 2019-01-09 NOTE — PATIENT INSTRUCTIONS
If you have any questions or concerns about today's appointment, the verbal and/or written instructions you were given for follow up care, please call our office at [phone].     [facility]  [address] [place] [address] [address]    [phone] office  [phone]

## 2019-01-09 NOTE — PROGRESS NOTES
Chief Complaint   Patient presents with    Post OP Follow Up     excision of left thigh mass     Patient reports that she is feeling well but just a little sore     1. Have you been to the ER, urgent care clinic since your last visit? Hospitalized since your last visit? No    2. Have you seen or consulted any other health care providers outside of the [facility] since your last visit? Include any pap smears or colon screening.  No

## 2019-01-10 NOTE — PROGRESS NOTES
Patient seen and examined. Doing well. No complaints. Wounds are healing well. Pathology on both specimens came back as lipomas. Follow up as needed.

## 2019-01-11 ENCOUNTER — DOCUMENTATION ONLY (OUTPATIENT)
Dept: FAMILY MEDICINE CLINIC | Age: 59
End: 2019-01-11

## 2019-01-11 NOTE — PROGRESS NOTES
DOCUMENTATION ONLY:  [facility] sent over progress notes from the patient appointment on 01/07/2019. After review, the documentation of the notes were sent to central scanning on 01/11/2019.

## 2019-01-18 ENCOUNTER — ANESTHESIA EVENT (OUTPATIENT)
Dept: ENDOSCOPY | Age: 59
End: 2019-01-18
Payer: COMMERCIAL

## 2019-01-21 ENCOUNTER — HOSPITAL ENCOUNTER (OUTPATIENT)
Age: 59
Setting detail: OUTPATIENT SURGERY
Discharge: HOME | End: 2019-01-21
Attending: INTERNAL MEDICINE | Admitting: INTERNAL MEDICINE
Payer: COMMERCIAL

## 2019-01-21 ENCOUNTER — ANESTHESIA (OUTPATIENT)
Dept: ENDOSCOPY | Age: 59
End: 2019-01-21
Payer: COMMERCIAL

## 2019-01-21 VITALS
WEIGHT: 158 LBS | OXYGEN SATURATION: 100 % | TEMPERATURE: 97 F | RESPIRATION RATE: 16 BRPM | HEART RATE: 62 BPM | SYSTOLIC BLOOD PRESSURE: 94 MMHG | BODY MASS INDEX: 27.12 KG/M2 | DIASTOLIC BLOOD PRESSURE: 44 MMHG

## 2019-01-21 LAB — GLUCOSE BLD STRIP.AUTO-MCNC: 124 MG/DL (ref 70–110)

## 2019-01-21 PROCEDURE — 74011250636 HC RX REV CODE- 250/636

## 2019-01-21 PROCEDURE — 76060000031 HC ANESTHESIA FIRST 0.5 HR: Performed by: INTERNAL MEDICINE

## 2019-01-21 PROCEDURE — 77030031670 HC DEV INFL ENDOTEK BIG60 MRTM -B: Performed by: INTERNAL MEDICINE

## 2019-01-21 PROCEDURE — 74011250636 HC RX REV CODE- 250/636: Performed by: NURSE ANESTHETIST, CERTIFIED REGISTERED

## 2019-01-21 PROCEDURE — 82962 GLUCOSE BLOOD TEST: CPT

## 2019-01-21 PROCEDURE — 76040000019: Performed by: INTERNAL MEDICINE

## 2019-01-21 RX ORDER — FAMOTIDINE 20 MG/1
20 TABLET, FILM COATED ORAL ONCE
Status: DISCONTINUED | OUTPATIENT
Start: 2019-01-21 | End: 2019-01-21 | Stop reason: HOSPADM

## 2019-01-21 RX ORDER — SODIUM CHLORIDE 0.9 % (FLUSH) 0.9 %
5-40 SYRINGE (ML) INJECTION AS NEEDED
Status: CANCELLED | OUTPATIENT
Start: 2019-01-21

## 2019-01-21 RX ORDER — SODIUM CHLORIDE 9 MG/ML
25 INJECTION, SOLUTION INTRAVENOUS CONTINUOUS
Status: CANCELLED | OUTPATIENT
Start: 2019-01-21 | End: 2019-01-21

## 2019-01-21 RX ORDER — INSULIN LISPRO 100 [IU]/ML
INJECTION, SOLUTION INTRAVENOUS; SUBCUTANEOUS ONCE
Status: DISCONTINUED | OUTPATIENT
Start: 2019-01-21 | End: 2019-01-21 | Stop reason: HOSPADM

## 2019-01-21 RX ORDER — SODIUM CHLORIDE, SODIUM LACTATE, POTASSIUM CHLORIDE, CALCIUM CHLORIDE 600; 310; 30; 20 MG/100ML; MG/100ML; MG/100ML; MG/100ML
50 INJECTION, SOLUTION INTRAVENOUS CONTINUOUS
Status: DISCONTINUED | OUTPATIENT
Start: 2019-01-21 | End: 2019-01-21 | Stop reason: HOSPADM

## 2019-01-21 RX ORDER — FENTANYL CITRATE 50 UG/ML
INJECTION, SOLUTION INTRAMUSCULAR; INTRAVENOUS AS NEEDED
Status: DISCONTINUED | OUTPATIENT
Start: 2019-01-21 | End: 2019-01-21 | Stop reason: HOSPADM

## 2019-01-21 RX ORDER — PROPOFOL 10 MG/ML
INJECTION, EMULSION INTRAVENOUS AS NEEDED
Status: DISCONTINUED | OUTPATIENT
Start: 2019-01-21 | End: 2019-01-21 | Stop reason: HOSPADM

## 2019-01-21 RX ORDER — SODIUM CHLORIDE 0.9 % (FLUSH) 0.9 %
5-40 SYRINGE (ML) INJECTION EVERY 8 HOURS
Status: CANCELLED | OUTPATIENT
Start: 2019-01-21

## 2019-01-21 RX ADMIN — FENTANYL CITRATE 50 MCG: 50 INJECTION, SOLUTION INTRAMUSCULAR; INTRAVENOUS at 13:41

## 2019-01-21 RX ADMIN — SODIUM CHLORIDE, SODIUM LACTATE, POTASSIUM CHLORIDE, AND CALCIUM CHLORIDE 50 ML/HR: 600; 310; 30; 20 INJECTION, SOLUTION INTRAVENOUS at 12:16

## 2019-01-21 RX ADMIN — PROPOFOL 50 MG: 10 INJECTION, EMULSION INTRAVENOUS at 13:52

## 2019-01-21 RX ADMIN — PROPOFOL 50 MG: 10 INJECTION, EMULSION INTRAVENOUS at 13:43

## 2019-01-21 RX ADMIN — PROPOFOL 20 MG: 10 INJECTION, EMULSION INTRAVENOUS at 13:46

## 2019-01-21 RX ADMIN — FENTANYL CITRATE 50 MCG: 50 INJECTION, SOLUTION INTRAMUSCULAR; INTRAVENOUS at 13:43

## 2019-01-21 RX ADMIN — PROPOFOL 30 MG: 10 INJECTION, EMULSION INTRAVENOUS at 13:48

## 2019-01-21 NOTE — PROCEDURES
Colonoscopy Procedure Note    Patient: William Cortes MRN: [id number]  SSN: [id number]    YOB: 1960  Age: 62 y.o. Sex: female      Date of Procedure: 1/21/2019      Procedures:  COLONOSCOPY:   HISTORY UPDATE: History and physical has been reviewed. The patient has been examined. There have been no significant clinical changes since the completion of the originally dated History and Physical.   INDICATION:  Generalized abdominal pain   PROCEDURE PERFORMED:Colonoscopy was complete to terminal ileum    ENDOSCOPIST: Traci Liu MD   ASSISTANT:  Endoscopy Technician-Frida Penn[address]  Endoscopy RN-1: Rio Guillen RN   CLASSIFICATION OF PREOPERATIVE RISK: ASA 2 - Patient with mild systemic disease with no functional limitations   ANESTHESIA:  MAC anesthesia   ENDOSCOPE: [id number]                                                                                                        EXTENT OF EXAM: Terminal ileum    QUALITY OF COLON PREPARATION:  good     DESCRIPTION OF PROCEDURE:  The procedure was discussed with the     patient including but not limited to IV conscious sedation, bleeding, perforation and missed polyps and the consent form was signed and witnessed. A safety timeout was performed. Procedure done with MAC. The patient's vitals were monitored at all times, including heart rate and rhythm, oxygen saturation, and blood pressure. The patient was then placed into the left lateral decubitus position. A rectal exam was performed. The Olympus Adult diagnostic colonscope was then passed under direct visualization with ease to the terminal ileum  The cecum was identified by landmarks including Ileocecal valve, appendiceal orifice and crow's foot. The scope was then slowly withdrawn while closely visualizing the mucosa in the rectum a retroflection was performed and distal rectum visualized. The scope was then removed.   The patient was transferred to the recovery room Findings: 1. Terminal ileum normal to 10 cm 2. A 6-7 mmlipoma seen in ascending colon 3. Mild diverticulosis in the sigmoid colon 4. Small internal hemorrhoids on retroflexion 5.colonic mucosa without any inflammation, masses, strictures. EBL: None   SPECIMENS: None   IMPRESSION: 1. Terminal ileum normal to 10 cm 2. A 6-7 mmlipoma seen in ascending colon 3. Mild diverticulosis in the sigmoid colon 4. Small internal hemorrhoids on retroflexion 5.colonic mucosa without any inflammation, masses, strictures. RECOMMENDATIONS:               1.    High fibre diet               2.  Repeat colonoscopy in 10 yrs.                    Follow Up:   As scheduled       Shavonne De Dios MD   1/21/2019

## 2019-01-21 NOTE — ANESTHESIA POSTPROCEDURE EVALUATION
Procedure(s): 
COLONOSCOPY. Anesthesia Post Evaluation Multimodal analgesia: multimodal analgesia used between 6 hours prior to anesthesia start to PACU discharge Patient location during evaluation: bedside Patient participation: complete - patient participated Level of consciousness: awake Pain management: adequate Airway patency: patent Anesthetic complications: no 
Cardiovascular status: stable Respiratory status: acceptable Hydration status: acceptable Post anesthesia nausea and vomiting:  controlled Visit Vitals BP 94/44 Pulse 62 Temp 36.1 °C (97 °F) Resp 16 Wt 71.7 kg (158 lb) SpO2 100% Breastfeeding? No  
BMI 27.12 kg/m²

## 2019-01-21 NOTE — H&P
Date of Surgery Update: 
Reina Arndt was seen and examined. History and physical has been reviewed. The patient has been examined.  There have been no significant clinical changes since the completion of the originally dated History and Physical. 
 
Signed By: Joselyn Vasquez MD   
 January 21, 2019 11:48 AM

## 2019-01-21 NOTE — DISCHARGE SUMMARY
Phase 2 Recovery Summary  Patient arrived to Phase 2  Report received from ENDO RN and CRNA  Vitals:    01/21/19 1144 01/21/19 1404   BP: 129/83 94/44   Pulse: 86 62   Resp: 16 16   Temp: 98.1 °F (36.7 °C) 97 °F (36.1 °C)   SpO2: 98% 100%   Weight: 71.7 kg (158 lb)        oriented to time, place, person and situation    Lines and Drains  Peripheral Intravenous Line:   Peripheral IV 01/21/19 Left Arm (Active)   Site Assessment Clean, dry, & intact 1/21/2019  2:04 PM   Phlebitis Assessment 0 1/21/2019  2:04 PM   Infiltration Assessment 0 1/21/2019  2:04 PM   Dressing Status Clean, dry, & intact 1/21/2019  2:04 PM   Dressing Type Tape;Transparent 1/21/2019  2:04 PM   Hub Color/Line Status Blue; Infusing 1/21/2019  2:04 PM   Alcohol Cap Used Yes 1/21/2019 12:14 PM       Wound  Wound Thigh Left (Active)   Number of days: 25       Wound Knee Left (Active)   Number of days: 25       Dr. Sue Patino by bedside    Patient discharged to home with Anton Gastelum,  .   Junaid Steel

## 2019-01-21 NOTE — DISCHARGE INSTRUCTIONS
Patient Education        Colonoscopy: What to Expect at Home  Your Recovery  After you have a colonoscopy, you will stay at the clinic for 1 to 2 hours until the medicines wear off. Then you can go home. But you will need to arrange for a ride. Your doctor will tell you when you can eat and do your other usual activities. Your doctor will talk to you about when you will need your next colonoscopy. Your doctor can help you decide how often you need to be checked. This will depend on the results of your test and your risk for colorectal cancer. After the test, you may be bloated or have gas pains. You may need to pass gas. If a biopsy was done or a polyp was removed, you may have streaks of blood in your stool (feces) for a few days. Problems such as heavy rectal bleeding may not occur until several weeks after the test. This isn't common. But it can happen after polyps are removed. This care sheet gives you a general idea about how long it will take for you to recover. But each person recovers at a different pace. Follow the steps below to get better as quickly as possible. How can you care for yourself at home? Activity    · Rest when you feel tired.     · You can do your normal activities when it feels okay to do so. Diet    · Follow your doctor's directions for eating.     · Unless your doctor has told you not to, drink plenty of fluids. This helps to replace the fluids that were lost during the colon prep.     · Do not drink alcohol. Medicines    · Your doctor will tell you if and when you can restart your medicines. He or she will also give you instructions about taking any new medicines.     · If you take blood thinners, such as warfarin (Coumadin), clopidogrel (Plavix), or aspirin, be sure to talk to your doctor. He or she will tell you if and when to start taking those medicines again.  Make sure that you understand exactly what your doctor wants you to do.     · If polyps were removed or a biopsy was done during the test, your doctor may tell you not to take aspirin or other anti-inflammatory medicines for a few days. These include ibuprofen (Advil, Motrin) and naproxen (Aleve). Other instructions    · For your safety, do not drive or operate machinery until the medicine wears off and you can think clearly. Your doctor may tell you not to drive or operate machinery until the day after your test.     · Do not sign legal documents or make major decisions until the medicine wears off and you can think clearly. The anesthesia can make it hard for you to fully understand what you are agreeing to. Follow-up care is a key part of your treatment and safety. Be sure to make and go to all appointments, and call your doctor if you are having problems. It's also a good idea to know your test results and keep a list of the medicines you take. When should you call for help? Call 911 anytime you think you may need emergency care. For example, call if:    · You passed out (lost consciousness).     · You pass maroon or bloody stools.     · You have trouble breathing.    Call your doctor now or seek immediate medical care if:    · You have pain that does not get better after you take pain medicine.     · You are sick to your stomach or cannot drink fluids.     · You have new or worse belly pain.     · You have blood in your stools.     · You have a fever.     · You cannot pass stools or gas.    Watch closely for changes in your health, and be sure to contact your doctor if you have any problems. Where can you learn more? Go to http://betzy-didi.info/. Enter E264 in the search box to learn more about \"Colonoscopy: What to Expect at Home. \"  Current as of: March 27, 2018  Content Version: 11.9  © 1955-4892 [employer]. Care instructions adapted under license by [employer] (which disclaims liability or warranty for this information).  If you have questions about a medical condition or this instruction, always ask your healthcare professional. [place] [address] any warranty or liability for your use of this information. DISCHARGE SUMMARY from Nurse    PATIENT INSTRUCTIONS:    After general anesthesia or intravenous sedation, for 24 hours or while taking prescription Narcotics:  · Limit your activities  · Do not drive and operate hazardous machinery  · Do not make important personal or business decisions  · Do  not drink alcoholic beverages  · If you have not urinated within 8 hours after discharge, please contact your surgeon on call. Report the following to your surgeon:  · Excessive pain, swelling, redness or odor of or around the surgical area  · Temperature over 100.5  · Nausea and vomiting lasting longer than 4 hours or if unable to take medications  · Any signs of decreased circulation or nerve impairment to extremity: change in color, persistent  numbness, tingling, coldness or increase pain  · Any questions    What to do at Home:    These are general instructions for a healthy lifestyle:    No smoking/ No tobacco products/ Avoid exposure to second hand smoke  Surgeon General's Warning:  Quitting smoking now greatly reduces serious risk to your health. Obesity, smoking, and sedentary lifestyle greatly increases your risk for illness    A healthy diet, regular physical exercise & weight monitoring are important for maintaining a healthy lifestyle    You may be retaining fluid if you have a history of heart failure or if you experience any of the following symptoms:  Weight gain of 3 pounds or more overnight or 5 pounds in a week, increased swelling in our hands or feet or shortness of breath while lying flat in bed. Please call your doctor as soon as you notice any of these symptoms; do not wait until your next office visit.     Recognize signs and symptoms of STROKE:    F-face looks uneven    A-arms unable to move or move unevenly    S-speech slurred or non-existent    T-time-call 911 as soon as signs and symptoms begin-DO NOT go       Back to bed or wait to see if you get better-TIME IS BRAIN. Warning Signs of HEART ATTACK     Call 911 if you have these symptoms:   Chest discomfort. Most heart attacks involve discomfort in the center of the chest that lasts more than a few minutes, or that goes away and comes back. It can feel like uncomfortable pressure, squeezing, fullness, or pain.  Discomfort in other areas of the upper body. Symptoms can include pain or discomfort in one or both arms, the back, neck, jaw, or stomach.  Shortness of breath with or without chest discomfort.  Other signs may include breaking out in a cold sweat, nausea, or lightheadedness. Don't wait more than five minutes to call 911 - MINUTES MATTER! Fast action can save your life. Calling 911 is almost always the fastest way to get lifesaving treatment. Emergency Medical Services staff can begin treatment when they arrive -- up to an hour sooner than if someone gets to the hospital by car. The discharge information has been reviewed with the patient. The patient verbalized understanding. Discharge medications reviewed with the patient and appropriate educational materials and side effects teaching were provided. ___________________________________________________________________________________________________________________________________    Patient armband removed and given to patient to take home.   Patient was informed of the privacy risks if armband lost or stolen

## 2019-01-21 NOTE — ROUTINE PROCESS
Patient taken to recovery by this RN and CRNA, bedside report given to [facility] RN. Patient stable and on monitor. SBAR completed.

## 2019-02-17 DIAGNOSIS — K21.9 GASTROESOPHAGEAL REFLUX DISEASE WITHOUT ESOPHAGITIS: Chronic | ICD-10-CM

## 2019-02-18 RX ORDER — OMEPRAZOLE 10 MG/1
CAPSULE, DELAYED RELEASE ORAL
Qty: 30 CAP | Refills: 11 | Status: SHIPPED | OUTPATIENT
Start: 2019-02-18 | End: 2020-09-29 | Stop reason: SDUPTHER

## 2019-02-21 ENCOUNTER — HOSPITAL ENCOUNTER (EMERGENCY)
Age: 59
Discharge: HOME | End: 2019-02-21
Attending: EMERGENCY MEDICINE
Payer: COMMERCIAL

## 2019-02-21 VITALS
WEIGHT: 160 LBS | HEART RATE: 85 BPM | BODY MASS INDEX: 27.46 KG/M2 | TEMPERATURE: 97.9 F | SYSTOLIC BLOOD PRESSURE: 138 MMHG | DIASTOLIC BLOOD PRESSURE: 85 MMHG | OXYGEN SATURATION: 100 % | RESPIRATION RATE: 17 BRPM

## 2019-02-21 DIAGNOSIS — G89.29 CHRONIC PAIN OF RIGHT KNEE: ICD-10-CM

## 2019-02-21 DIAGNOSIS — G89.29 CHRONIC ABDOMINAL PAIN: Primary | ICD-10-CM

## 2019-02-21 DIAGNOSIS — R10.9 CHRONIC ABDOMINAL PAIN: Primary | ICD-10-CM

## 2019-02-21 DIAGNOSIS — M25.561 CHRONIC PAIN OF RIGHT KNEE: ICD-10-CM

## 2019-02-21 DIAGNOSIS — M54.6 CHRONIC BILATERAL THORACIC BACK PAIN: ICD-10-CM

## 2019-02-21 DIAGNOSIS — G89.29 CHRONIC BILATERAL THORACIC BACK PAIN: ICD-10-CM

## 2019-02-21 LAB
ALBUMIN SERPL-MCNC: 3.8 G/DL (ref 3.4–5)
ALBUMIN/GLOB SERPL: 1.1 {RATIO} (ref 0.8–1.7)
ALP SERPL-CCNC: 135 U/L (ref 45–117)
ALT SERPL-CCNC: 18 U/L (ref 13–56)
ANION GAP SERPL CALC-SCNC: 5 MMOL/L (ref 3–18)
APPEARANCE UR: CLEAR
AST SERPL-CCNC: 13 U/L (ref 15–37)
BASOPHILS # BLD: 0 K/UL (ref 0–0.1)
BASOPHILS NFR BLD: 1 % (ref 0–2)
BILIRUB SERPL-MCNC: 0.2 MG/DL (ref 0.2–1)
BILIRUB UR QL: NEGATIVE
BUN SERPL-MCNC: 17 MG/DL (ref 7–18)
BUN/CREAT SERPL: 12 (ref 12–20)
CALCIUM SERPL-MCNC: 9.1 MG/DL (ref 8.5–10.1)
CHLORIDE SERPL-SCNC: 105 MMOL/L (ref 100–108)
CO2 SERPL-SCNC: 31 MMOL/L (ref 21–32)
COLOR UR: YELLOW
CREAT SERPL-MCNC: 1.43 MG/DL (ref 0.6–1.3)
DIFFERENTIAL METHOD BLD: ABNORMAL
EOSINOPHIL # BLD: 0.1 K/UL (ref 0–0.4)
EOSINOPHIL NFR BLD: 1 % (ref 0–5)
ERYTHROCYTE [DISTWIDTH] IN BLOOD BY AUTOMATED COUNT: 15 % (ref 11.6–14.5)
GLOBULIN SER CALC-MCNC: 3.5 G/DL (ref 2–4)
GLUCOSE SERPL-MCNC: 98 MG/DL (ref 74–99)
GLUCOSE UR STRIP.AUTO-MCNC: >1000 MG/DL
HCT VFR BLD AUTO: 42.6 % (ref 35–45)
HGB BLD-MCNC: 13.3 G/DL (ref 12–16)
HGB UR QL STRIP: NEGATIVE
KETONES UR QL STRIP.AUTO: ABNORMAL MG/DL
LEUKOCYTE ESTERASE UR QL STRIP.AUTO: NEGATIVE
LYMPHOCYTES # BLD: 2 K/UL (ref 0.9–3.6)
LYMPHOCYTES NFR BLD: 27 % (ref 21–52)
MCH RBC QN AUTO: 27.5 PG (ref 24–34)
MCHC RBC AUTO-ENTMCNC: 31.2 G/DL (ref 31–37)
MCV RBC AUTO: 88.2 FL (ref 74–97)
MONOCYTES # BLD: 0.6 K/UL (ref 0.05–1.2)
MONOCYTES NFR BLD: 8 % (ref 3–10)
NEUTS SEG # BLD: 4.7 K/UL (ref 1.8–8)
NEUTS SEG NFR BLD: 63 % (ref 40–73)
NITRITE UR QL STRIP.AUTO: NEGATIVE
PH UR STRIP: 5.5 [PH] (ref 5–8)
PLATELET # BLD AUTO: 319 K/UL (ref 135–420)
PMV BLD AUTO: 10.7 FL (ref 9.2–11.8)
POTASSIUM SERPL-SCNC: 4.1 MMOL/L (ref 3.5–5.5)
PROT SERPL-MCNC: 7.3 G/DL (ref 6.4–8.2)
PROT UR STRIP-MCNC: NEGATIVE MG/DL
RBC # BLD AUTO: 4.83 M/UL (ref 4.2–5.3)
SODIUM SERPL-SCNC: 141 MMOL/L (ref 136–145)
SP GR UR REFRACTOMETRY: 1.03 (ref 1–1.03)
UROBILINOGEN UR QL STRIP.AUTO: 0.2 EU/DL (ref 0.2–1)
WBC # BLD AUTO: 7.4 K/UL (ref 4.6–13.2)

## 2019-02-21 PROCEDURE — 99283 EMERGENCY DEPT VISIT LOW MDM: CPT

## 2019-02-21 PROCEDURE — 96372 THER/PROPH/DIAG INJ SC/IM: CPT

## 2019-02-21 PROCEDURE — 85025 COMPLETE CBC W/AUTO DIFF WBC: CPT

## 2019-02-21 PROCEDURE — 80053 COMPREHEN METABOLIC PANEL: CPT

## 2019-02-21 PROCEDURE — 74011250636 HC RX REV CODE- 250/636: Performed by: EMERGENCY MEDICINE

## 2019-02-21 PROCEDURE — 81003 URINALYSIS AUTO W/O SCOPE: CPT

## 2019-02-21 RX ORDER — DIPHENHYDRAMINE HYDROCHLORIDE 50 MG/ML
50 INJECTION, SOLUTION INTRAMUSCULAR; INTRAVENOUS
Status: COMPLETED | OUTPATIENT
Start: 2019-02-21 | End: 2019-02-21

## 2019-02-21 RX ORDER — PROCHLORPERAZINE EDISYLATE 5 MG/ML
10 INJECTION INTRAMUSCULAR; INTRAVENOUS
Status: COMPLETED | OUTPATIENT
Start: 2019-02-21 | End: 2019-02-21

## 2019-02-21 RX ADMIN — PROCHLORPERAZINE EDISYLATE 10 MG: 5 INJECTION INTRAMUSCULAR; INTRAVENOUS at 18:15

## 2019-02-21 RX ADMIN — DIPHENHYDRAMINE HYDROCHLORIDE 50 MG: 50 INJECTION, SOLUTION INTRAMUSCULAR; INTRAVENOUS at 18:15

## 2019-02-21 NOTE — ED PROVIDER NOTES
EMERGENCY DEPARTMENT HISTORY AND PHYSICAL EXAM 
 
6:00 PM 
 
 
Date: 2/21/2019 Patient Name: Jenny Gonzalez History of Presenting Illness No chief complaint on file. CC:  Chronic abd pain, RA flare of right knee and back History Provided By: Patient Additional History (Context): Jenny Gonzalez is a 61 y.o. female with diabetes, hypertension, asthma and abd adhesions and rheumatoid arthritis  who presents with right knee pain, back pain, abd pain, and weakness onset yesterday. The pt stated that her pain is the nml pain she experiences when her rheumatoid arthritis flares up. PCP: June Palomares MD 
 
Chief Complaint: Knee pain Duration:  Yesterday Timing:  Not obtained at this time Location: Right Quality: Not obtianed at this time Severity: Not obtained at this time Modifying Factors: Not obtained at this time Associated Symptoms: back pain, abd pain, and weakness Current Outpatient Medications Medication Sig Dispense Refill  omeprazole (PRILOSEC) 10 mg capsule take 1 capsule by mouth once daily 30 Cap 11  
 HYDROcodone-acetaminophen (NORCO) 5-325 mg per tablet take 1 tablet by mouth every 4 hours if needed 12 Tab 0  
 FARXIGA 10 mg tab tablet take 1 tablet by mouth once daily 90 Tab 3  promethazine-codeine (PHENERGAN WITH CODEINE) 6.25-10 mg/5 mL syrup Take 5 mL by mouth four (4) times daily as needed for Cough. Max Daily Amount: 20 mL. 118 mL 0  
 predniSONE (DELTASONE) 5 mg tablet Take 10 mg by mouth two (2) times a day. Alternate with 5 mg every other week  amLODIPine (NORVASC) 2.5 mg tablet take 1 tablet by mouth once daily NIGHTLY 90 Tab 1  
 albuterol (PROVENTIL HFA, VENTOLIN HFA, PROAIR HFA) 90 mcg/actuation inhaler Take 1 Puff by inhalation every six (6) hours as needed for Wheezing. 1 Inhaler 0  
 folic acid (FOLVITE) 1 mg tablet Take  by mouth daily.  hydroxychloroquine (PLAQUENIL) 200 mg tablet Take 200 mg by mouth daily.  methotrexate (RHEUMATREX) 2.5 mg tablet Take 5 mg by mouth every Tuesday.  sulfaSALAzine (AZULFIDINE) 500 mg tablet Take 500 mg by mouth two (2) times a day.  metFORMIN (GLUMETZA ER) 500 mg TG24 24 hour tablet take 1 tablet by mouth twice a day 60 Tab 2  
 ondansetron (ZOFRAN ODT) 4 mg disintegrating tablet Take 1 Tab by mouth every eight (8) hours as needed for Nausea. 30 Tab 0  
 bisoprolol-hydroCHLOROthiazide (ZIAC) 2.5-6.25 mg per tablet Take 1 Tab by mouth daily. 90 Tab 3  ALLERGY RELIEF, CETIRIZINE, 10 mg tablet take 1 tablet by mouth once daily  0  
 glucose blood VI test strips (ONETOUCH ULTRA TEST) strip Check blood glucose as directed 100 Strip 1 Past History Past Medical History: 
Past Medical History:  
Diagnosis Date  Abdominal adhesions  Anemia  Asthma  Diabetes mellitus  Dyskinesia   
 bilateral  
 Essential hypertension  GERD (gastroesophageal reflux disease)  Hypertension  Menopause  Microhematuria  Rheumatoid arteritis 2018  Stool color black Past Surgical History: 
Past Surgical History:  
Procedure Laterality Date  COLONOSCOPY N/A 1/21/2019 COLONOSCOPY performed by Cesar Maldonado MD at [address]  6/28/10  
 HX COLONOSCOPY    
 HX ENDOSCOPY    
 HX HERNIA REPAIR    
 HX HYSTERECTOMY Fibroids  HX KNEE REPLACEMENT Left  HX KNEE REPLACEMENT Right 06/2018  HX OOPHORECTOMY  12/2000  HX ORTHOPAEDIC Right   
 ankle- multiple surgeries  HX SMALL BOWEL RESECTION  12/2000  HX SMALL BOWEL RESECTION  02/21/2017 Dr. Esther Lyle [address] [facility] Family History: 
Family History Problem Relation Age of Onset  Hypertension Other   
     parent  Breast Cancer Other 21  
 Heart Disease Father  Hypertension Father  Diabetes Father  Diabetes Mother  Hypertension Other   
     sibling  Diabetes Other   
     parent  Diabetes Sister  Kidney Disease Brother  Diabetes Brother Social History: 
Social History Tobacco Use  Smoking status: Never Smoker  Smokeless tobacco: Never Used Substance Use Topics  Alcohol use: No  
 Drug use: No  
 
 
Allergies: Allergies Allergen Reactions  Macrodantin [Nitrofurantoin Macrocrystalline] Itching and Other (comments)  Tape [Adhesive] Other (comments) Paper tape-- feels like burning skin Burned skin when had wound vac Review of Systems Review of Systems Gastrointestinal: Positive for abdominal pain. Musculoskeletal: Positive for back pain. Right knee pain Neurological: Positive for weakness. All other systems reviewed and are negative. Physical Exam  
 
Visit Vitals BP (!) 144/97 (BP 1 Location: Right arm, BP Patient Position: At rest) Pulse 96 Temp 97.9 °F (36.6 °C) Resp 17 Wt 72.6 kg (160 lb) SpO2 100% BMI 27.46 kg/m² Physical Exam  
Constitutional: She is oriented to person, place, and time. She appears well-developed and well-nourished. No distress. HENT:  
Head: Normocephalic and atraumatic. Right Ear: External ear normal. No tenderness. Left Ear: Hearing and external ear normal. No tenderness. Nose: Nose normal. No rhinorrhea. Mouth/Throat: Oropharynx is clear and moist and mucous membranes are normal. No oropharyngeal exudate. Eyes: Conjunctivae are normal. Pupils are equal, round, and reactive to light. Neck: Normal range of motion. Neck supple. No tracheal deviation present. No thyromegaly present. Cardiovascular: Normal rate, regular rhythm, S1 normal, S2 normal, normal heart sounds and intact distal pulses. Exam reveals no gallop and no friction rub. No murmur heard. Pulmonary/Chest: Effort normal and breath sounds normal. No respiratory distress. She has no wheezes. She has no rhonchi. She has no rales.  Right breast exhibits no tenderness. Left breast exhibits no tenderness. Abdominal: Soft. Normal appearance and bowel sounds are normal. She exhibits no distension. There is no tenderness. Mild tenderness diffusely No peritoneal signs Musculoskeletal: Normal range of motion. She exhibits no edema, tenderness or deformity. Tenderness of right knee and back Neurological: She is alert and oriented to person, place, and time. She has normal strength and normal reflexes. No cranial nerve deficit or sensory deficit. Gait normal.  
Skin: Skin is warm, dry and intact. No rash noted. No pallor. Psychiatric: She has a normal mood and affect. Her behavior is normal. Thought content normal. She expresses no homicidal and no suicidal ideation. Nursing note and vitals reviewed. Diagnostic Study Results Labs - Recent Results (from the past 12 hour(s)) URINALYSIS W/ RFLX MICROSCOPIC Collection Time: 02/21/19  5:11 PM  
Result Value Ref Range Color YELLOW Appearance CLEAR Specific gravity 1.026 1.005 - 1.030    
 pH (UA) 5.5 5.0 - 8.0 Protein NEGATIVE  NEG mg/dL Glucose >1,000 (A) NEG mg/dL Ketone TRACE (A) NEG mg/dL Bilirubin NEGATIVE  NEG Blood NEGATIVE  NEG Urobilinogen 0.2 0.2 - 1.0 EU/dL Nitrites NEGATIVE  NEG Leukocyte Esterase NEGATIVE  NEG    
CBC WITH AUTOMATED DIFF Collection Time: 02/21/19  5:15 PM  
Result Value Ref Range WBC 7.4 4.6 - 13.2 K/uL  
 RBC 4.83 4.20 - 5.30 M/uL  
 HGB 13.3 12.0 - 16.0 g/dL HCT 42.6 35.0 - 45.0 % MCV 88.2 74.0 - 97.0 FL  
 MCH 27.5 24.0 - 34.0 PG  
 MCHC 31.2 31.0 - 37.0 g/dL  
 RDW 15.0 (H) 11.6 - 14.5 % PLATELET 488 991 - 454 K/uL MPV 10.7 9.2 - 11.8 FL  
 NEUTROPHILS 63 40 - 73 % LYMPHOCYTES 27 21 - 52 % MONOCYTES 8 3 - 10 % EOSINOPHILS 1 0 - 5 % BASOPHILS 1 0 - 2 %  
 ABS. NEUTROPHILS 4.7 1.8 - 8.0 K/UL  
 ABS. LYMPHOCYTES 2.0 0.9 - 3.6 K/UL  
 ABS. MONOCYTES 0.6 0.05 - 1.2 K/UL ABS. EOSINOPHILS 0.1 0.0 - 0.4 K/UL  
 ABS. BASOPHILS 0.0 0.0 - 0.1 K/UL  
 DF AUTOMATED METABOLIC PANEL, COMPREHENSIVE Collection Time: 02/21/19  5:15 PM  
Result Value Ref Range Sodium 141 136 - 145 mmol/L Potassium 4.1 3.5 - 5.5 mmol/L Chloride 105 100 - 108 mmol/L  
 CO2 31 21 - 32 mmol/L Anion gap 5 3.0 - 18 mmol/L Glucose 98 74 - 99 mg/dL BUN 17 7.0 - 18 MG/DL Creatinine 1.43 (H) 0.6 - 1.3 MG/DL  
 BUN/Creatinine ratio 12 12 - 20 GFR est AA 46 (L) >60 ml/min/1.73m2 GFR est non-AA 38 (L) >60 ml/min/1.73m2 Calcium 9.1 8.5 - 10.1 MG/DL Bilirubin, total 0.2 0.2 - 1.0 MG/DL  
 ALT (SGPT) 18 13 - 56 U/L  
 AST (SGOT) 13 (L) 15 - 37 U/L Alk. phosphatase 135 (H) 45 - 117 U/L Protein, total 7.3 6.4 - 8.2 g/dL Albumin 3.8 3.4 - 5.0 g/dL Globulin 3.5 2.0 - 4.0 g/dL A-G Ratio 1.1 0.8 - 1.7 Radiologic Studies - No orders to display Medical Decision Making It should be noted that Anibal Le MD will be the provider of record for this patient. I reviewed the vital signs, available nursing notes, past medical history, past surgical history, family history and social history. Vital Signs-Reviewed the patient's vital signs. Pulse Oximetry Analysis -  100% on room air (Interpretation) WNL Cardiac Monitor: 
Rate: 96 bpm  
 
Records Reviewed: Nursing Notes and Old Medical Records (Time of Review: 6:00 PM) ED Course: Progress Notes, Reevaluation, and Consults: 
 
62 y/o female with acute on chronic abd pain, back pain, right knee pain. She tells me that this pain is exactly the same as her typical flare.  (record review shows prior encounters for same) 
abd is mildly tender, but no periotoneal findings. Similarly, back is diffusely tender, no  Vertebral point tenderness Knee with slightly decreased ROM and mildly ttp, not red, not warm, not swollen. Looks well, labs reassuring.  (glucosuria noted) Nothing to indicate DKA or other diabetic catastrophe. Low yield for imaging D/w pt - will not use opiates for chronic pain, but will give benadryl / compazine in ED Home to followup with her rheumatolgist. 
 
Precautions given for return. Diagnosis Clinical Impression: 1. Chronic abdominal pain 2. Chronic pain of right knee 3. Chronic bilateral thoracic back pain Disposition:  
discharge Follow-up Information Follow up With Specialties Details Why Contact Info Rico Hilliard MD Family Practice, Internal Medicine In 2 days  [address] [id number] [id number] [phone] [facility] EMERGENCY DEPT Emergency Medicine  As needed, If symptoms worsen [address] 
[phone]  
 your rheumatologist  In 2 days Medication List  
  
ASK your doctor about these medications   
albuterol 90 mcg/actuation inhaler Commonly known as:  PROVENTIL HFA, VENTOLIN HFA, PROAIR HFA Take 1 Puff by inhalation every six (6) hours as needed for Wheezing. ALLERGY RELIEF (CETIRIZINE) 10 mg tablet Generic drug:  cetirizine 
  
amLODIPine 2.5 mg tablet Commonly known as:  NORVASC 
take 1 tablet by mouth once daily NIGHTLY 
  
bisoprolol-hydroCHLOROthiazide 2.5-6.25 mg per tablet Commonly known as:  [facility] Take 1 Tab by mouth daily. FARXIGA 10 mg Tab tablet Generic drug:  dapagliflozin 
take 1 tablet by mouth once daily 
  
folic acid 1 mg tablet Commonly known as:  FOLVITE 
  
glucose blood VI test strips strip Commonly known as:  ONETOUCH ULTRA TEST Check blood glucose as directed HYDROcodone-acetaminophen 5-325 mg per tablet Commonly known as:  [place] 
take 1 tablet by mouth every 4 hours if needed 
  
metFORMIN 500 mg Tg24 24 hour tablet Commonly known as:  GLUMETZA ER 
take 1 tablet by mouth twice a day 
  
methotrexate 2.5 mg tablet Commonly known as:  RHEUMATREX 
  
omeprazole 10 mg capsule Commonly known as:  PRILOSEC 
take 1 capsule by mouth once daily 
  
ondansetron 4 mg disintegrating tablet Commonly known as:  ZOFRAN ODT Take 1 Tab by mouth every eight (8) hours as needed for Nausea. PLAQUENIL 200 mg tablet Generic drug:  hydroxychloroquine 
  
predniSONE 5 mg tablet Commonly known as:  DELTASONE 
  
promethazine-codeine 6.25-10 mg/5 mL syrup Commonly known as:  PHENERGAN with CODEINE Take 5 mL by mouth four (4) times daily as needed for Cough. Max Daily Amount: 20 mL. 
  
sulfaSALAzine 500 mg tablet Commonly known as:  AZULFIDINE 
  
  
 
_______________________________ Scribe Attestation I-Rosemary Parrish acting as a scribe for and in the presence of Mando Lance MD     
February 21, 2019 at 6:00 PM 
    
Provider Attestation:     
I personally performed the services described in the documentation, reviewed the documentation, as recorded by the scribe in my presence, and it accurately and completely records my words and actions. February 21, 2019 at 6:00 PM - Mando Lance MD   
 
 
_______________________________

## 2019-02-21 NOTE — DISCHARGE INSTRUCTIONS
Patient Education        Learning About Relief for Back Pain  What is back tension and strain? Back strain happens when you overstretch, or pull, a muscle in your back. You may hurt your back in an accident or when you exercise or lift something. Most back pain will get better with rest and time. You can take care of yourself at home to help your back heal.  What can you do first to relieve back pain? When you first feel back pain, try these steps:  · Walk. Take a short walk (10 to 20 minutes) on a level surface (no slopes, hills, or stairs) every 2 to 3 hours. Walk only distances you can manage without pain, especially leg pain. · Relax. Find a comfortable position for rest. Some people are comfortable on the floor or a medium-firm bed with a small pillow under their head and another under their knees. Some people prefer to lie on their side with a pillow between their knees. Don't stay in one position for too long. · Try heat or ice. Try using a heating pad on a low or medium setting, or take a warm shower, for 15 to 20 minutes every 2 to 3 hours. Or you can buy single-use heat wraps that last up to 8 hours. You can also try an ice pack for 10 to 15 minutes every 2 to 3 hours. You can use an ice pack or a bag of frozen vegetables wrapped in a thin towel. There is not strong evidence that either heat or ice will help, but you can try them to see if they help. You may also want to try switching between heat and cold. · Take pain medicine exactly as directed. ¨ If the doctor gave you a prescription medicine for pain, take it as prescribed. ¨ If you are not taking a prescription pain medicine, ask your doctor if you can take an over-the-counter medicine. What else can you do? · Stretch and exercise. Exercises that increase flexibility may relieve your pain and make it easier for your muscles to keep your spine in a good, neutral position. And don't forget to keep walking. · Do self-massage.  You can use self-massage to unwind after work or school or to energize yourself in the morning. You can easily massage your feet, hands, or neck. Self-massage works best if you are in comfortable clothes and are sitting or lying in a comfortable position. Use oil or lotion to massage bare skin. · Reduce stress. Back pain can lead to a vicious [place]: Distress about the pain tenses the muscles in your back, which in turn causes more pain. Learn how to relax your mind and your muscles to lower your stress. Where can you learn more? Go to http://betzy-didi.info/. Enter O299 in the search box to learn more about \"Learning About Relief for Back Pain. \"  Current as of: March 21, 2017  Content Version: 11.5  © 5318-2498 [employer]. Care instructions adapted under license by [employer] (which disclaims liability or warranty for this information). If you have questions about a medical condition or this instruction, always ask your healthcare professional. [address] any warranty or liability for your use of this information.

## 2019-03-05 ENCOUNTER — HOSPITAL ENCOUNTER (EMERGENCY)
Age: 59
Discharge: HOME | End: 2019-03-05
Attending: EMERGENCY MEDICINE | Admitting: EMERGENCY MEDICINE
Payer: COMMERCIAL

## 2019-03-05 VITALS
OXYGEN SATURATION: 100 % | WEIGHT: 160 LBS | TEMPERATURE: 98.6 F | HEIGHT: 64 IN | BODY MASS INDEX: 27.31 KG/M2 | SYSTOLIC BLOOD PRESSURE: 136 MMHG | DIASTOLIC BLOOD PRESSURE: 64 MMHG | HEART RATE: 75 BPM | RESPIRATION RATE: 18 BRPM

## 2019-03-05 DIAGNOSIS — R11.2 NON-INTRACTABLE VOMITING WITH NAUSEA, UNSPECIFIED VOMITING TYPE: Primary | ICD-10-CM

## 2019-03-05 DIAGNOSIS — R19.7 DIARRHEA, UNSPECIFIED TYPE: ICD-10-CM

## 2019-03-05 DIAGNOSIS — R10.84 ABDOMINAL PAIN, GENERALIZED: ICD-10-CM

## 2019-03-05 LAB
ALBUMIN SERPL-MCNC: 4.3 G/DL (ref 3.4–5)
ALBUMIN/GLOB SERPL: 1.1 {RATIO} (ref 0.8–1.7)
ALP SERPL-CCNC: 143 U/L (ref 45–117)
ALT SERPL-CCNC: 22 U/L (ref 13–56)
ANION GAP SERPL CALC-SCNC: 5 MMOL/L (ref 3–18)
APPEARANCE UR: CLEAR
AST SERPL-CCNC: 18 U/L (ref 15–37)
BASOPHILS # BLD: 0 K/UL (ref 0–0.1)
BASOPHILS NFR BLD: 0 % (ref 0–2)
BILIRUB SERPL-MCNC: 0.3 MG/DL (ref 0.2–1)
BILIRUB UR QL: NEGATIVE
BUN SERPL-MCNC: 19 MG/DL (ref 7–18)
BUN/CREAT SERPL: 17 (ref 12–20)
CALCIUM SERPL-MCNC: 10.1 MG/DL (ref 8.5–10.1)
CHLORIDE SERPL-SCNC: 105 MMOL/L (ref 100–108)
CO2 SERPL-SCNC: 30 MMOL/L (ref 21–32)
COLOR UR: YELLOW
CREAT SERPL-MCNC: 1.13 MG/DL (ref 0.6–1.3)
DIFFERENTIAL METHOD BLD: ABNORMAL
EOSINOPHIL # BLD: 0.1 K/UL (ref 0–0.4)
EOSINOPHIL NFR BLD: 2 % (ref 0–5)
ERYTHROCYTE [DISTWIDTH] IN BLOOD BY AUTOMATED COUNT: 15.5 % (ref 11.6–14.5)
GLOBULIN SER CALC-MCNC: 3.9 G/DL (ref 2–4)
GLUCOSE SERPL-MCNC: 119 MG/DL (ref 74–99)
GLUCOSE UR STRIP.AUTO-MCNC: >1000 MG/DL
HCT VFR BLD AUTO: 44.4 % (ref 35–45)
HGB BLD-MCNC: 14.1 G/DL (ref 12–16)
HGB UR QL STRIP: NEGATIVE
KETONES UR QL STRIP.AUTO: NEGATIVE MG/DL
LEUKOCYTE ESTERASE UR QL STRIP.AUTO: NEGATIVE
LIPASE SERPL-CCNC: 328 U/L (ref 73–393)
LYMPHOCYTES # BLD: 2.1 K/UL (ref 0.9–3.6)
LYMPHOCYTES NFR BLD: 30 % (ref 21–52)
MCH RBC QN AUTO: 27.6 PG (ref 24–34)
MCHC RBC AUTO-ENTMCNC: 31.8 G/DL (ref 31–37)
MCV RBC AUTO: 86.9 FL (ref 74–97)
MONOCYTES # BLD: 0.7 K/UL (ref 0.05–1.2)
MONOCYTES NFR BLD: 10 % (ref 3–10)
NEUTS SEG # BLD: 4.1 K/UL (ref 1.8–8)
NEUTS SEG NFR BLD: 58 % (ref 40–73)
NITRITE UR QL STRIP.AUTO: NEGATIVE
PH UR STRIP: 5 [PH] (ref 5–8)
PLATELET # BLD AUTO: 243 K/UL (ref 135–420)
PMV BLD AUTO: 11.2 FL (ref 9.2–11.8)
POTASSIUM SERPL-SCNC: 4.3 MMOL/L (ref 3.5–5.5)
PROT SERPL-MCNC: 8.2 G/DL (ref 6.4–8.2)
PROT UR STRIP-MCNC: NEGATIVE MG/DL
RBC # BLD AUTO: 5.11 M/UL (ref 4.2–5.3)
SODIUM SERPL-SCNC: 140 MMOL/L (ref 136–145)
SP GR UR REFRACTOMETRY: >1.03 (ref 1–1.03)
UROBILINOGEN UR QL STRIP.AUTO: 0.2 EU/DL (ref 0.2–1)
WBC # BLD AUTO: 7 K/UL (ref 4.6–13.2)

## 2019-03-05 PROCEDURE — 74011250636 HC RX REV CODE- 250/636: Performed by: EMERGENCY MEDICINE

## 2019-03-05 PROCEDURE — 99283 EMERGENCY DEPT VISIT LOW MDM: CPT

## 2019-03-05 PROCEDURE — 80053 COMPREHEN METABOLIC PANEL: CPT

## 2019-03-05 PROCEDURE — 85025 COMPLETE CBC W/AUTO DIFF WBC: CPT

## 2019-03-05 PROCEDURE — 96361 HYDRATE IV INFUSION ADD-ON: CPT

## 2019-03-05 PROCEDURE — 83690 ASSAY OF LIPASE: CPT

## 2019-03-05 PROCEDURE — 96375 TX/PRO/DX INJ NEW DRUG ADDON: CPT

## 2019-03-05 PROCEDURE — 96376 TX/PRO/DX INJ SAME DRUG ADON: CPT

## 2019-03-05 PROCEDURE — 96374 THER/PROPH/DIAG INJ IV PUSH: CPT

## 2019-03-05 PROCEDURE — 81003 URINALYSIS AUTO W/O SCOPE: CPT

## 2019-03-05 RX ORDER — ONDANSETRON 2 MG/ML
4 INJECTION INTRAMUSCULAR; INTRAVENOUS
Status: COMPLETED | OUTPATIENT
Start: 2019-03-05 | End: 2019-03-05

## 2019-03-05 RX ORDER — ONDANSETRON 4 MG/1
8 TABLET, FILM COATED ORAL
Qty: 12 TAB | Refills: 0 | Status: SHIPPED | OUTPATIENT
Start: 2019-03-05 | End: 2019-05-27

## 2019-03-05 RX ORDER — SODIUM CHLORIDE 9 MG/ML
150 INJECTION, SOLUTION INTRAVENOUS ONCE
Status: COMPLETED | OUTPATIENT
Start: 2019-03-05 | End: 2019-03-05

## 2019-03-05 RX ORDER — MORPHINE SULFATE 2 MG/ML
4 INJECTION, SOLUTION INTRAMUSCULAR; INTRAVENOUS ONCE
Status: COMPLETED | OUTPATIENT
Start: 2019-03-05 | End: 2019-03-05

## 2019-03-05 RX ADMIN — SODIUM CHLORIDE 150 ML/HR: 900 INJECTION, SOLUTION INTRAVENOUS at 19:19

## 2019-03-05 RX ADMIN — SODIUM CHLORIDE 1000 ML: 900 INJECTION, SOLUTION INTRAVENOUS at 18:48

## 2019-03-05 RX ADMIN — ONDANSETRON 4 MG: 2 INJECTION INTRAMUSCULAR; INTRAVENOUS at 18:47

## 2019-03-05 RX ADMIN — MORPHINE SULFATE 4 MG: 2 INJECTION, SOLUTION INTRAMUSCULAR; INTRAVENOUS at 19:15

## 2019-03-05 RX ADMIN — ONDANSETRON 4 MG: 2 INJECTION INTRAMUSCULAR; INTRAVENOUS at 19:15

## 2019-03-05 NOTE — LETTER
NOTIFICATION RETURN TO WORK 
 
3/5/2019 7:45 PM 
 
Ms. Nimo King [address] [facility] [address] [id number] To Whom It May Concern: 
 
Nimo King is currently under the care of [facility] EMERGENCY DEPT. She will return to work on: 3/8/19 If there are questions or concerns please have the patient contact our office.  
 
 
 
Sincerely, 
 
 
 
 
 
 
 
Kwame Gallegos MD

## 2019-03-05 NOTE — ED PROVIDER NOTES
EMERGENCY DEPARTMENT HISTORY AND PHYSICAL EXAM    5:30 PM      Date: 3/5/2019  Patient Name: Araceli Fulton    History of Presenting Illness     Chief Complaint   Patient presents with    Nausea    Vomiting    Vision Change         History Provided By: Patient      Additional History (Context): Araceli Fulton is a 61 y.o. female with diabetes, hypertension and anemia who presents with abd cramping for days. Pain is lower in her abd and has associated sx of N/V/D, feeling dehydrated. Pt notes ill-contact with students at school. She denies blood in vomit or stool, CP, SOB, syncope. Pt is still passing gas. Hx multiple abd surgeries. Pt uses etOH occasionally. PCP: Kamini Boyce MD    Chief Complaint: Abd pain  Duration:  Days  Timing:  Constant  Location: Lower  Quality: Cramping  Severity: 6 out of 10  Modifying Factors: none reported   Associated Symptoms: N/V/D, lightheadedness      Current Facility-Administered Medications   Medication Dose Route Frequency Provider Last Rate Last Dose    sodium chloride 0.9 % bolus infusion 1,000 mL  1,000 mL IntraVENous Johnny Recio MD 1,000 mL/hr at 03/05/19 1848 1,000 mL at 03/05/19 1848     Current Outpatient Medications   Medication Sig Dispense Refill    omeprazole (PRILOSEC) 10 mg capsule take 1 capsule by mouth once daily 30 Cap 11    HYDROcodone-acetaminophen (NORCO) 5-325 mg per tablet take 1 tablet by mouth every 4 hours if needed 12 Tab 0    FARXIGA 10 mg tab tablet take 1 tablet by mouth once daily 90 Tab 3    promethazine-codeine (PHENERGAN WITH CODEINE) 6.25-10 mg/5 mL syrup Take 5 mL by mouth four (4) times daily as needed for Cough. Max Daily Amount: 20 mL. 118 mL 0    predniSONE (DELTASONE) 5 mg tablet Take 10 mg by mouth two (2) times a day.  Alternate with 5 mg every other week      amLODIPine (NORVASC) 2.5 mg tablet take 1 tablet by mouth once daily NIGHTLY 90 Tab 1    albuterol (PROVENTIL HFA, VENTOLIN HFA, PROAIR HFA) 90 mcg/actuation inhaler Take 1 Puff by inhalation every six (6) hours as needed for Wheezing. 1 Inhaler 0    folic acid (FOLVITE) 1 mg tablet Take  by mouth daily.  hydroxychloroquine (PLAQUENIL) 200 mg tablet Take 200 mg by mouth daily.  methotrexate (RHEUMATREX) 2.5 mg tablet Take 5 mg by mouth every Tuesday.  sulfaSALAzine (AZULFIDINE) 500 mg tablet Take 500 mg by mouth two (2) times a day.  metFORMIN (GLUMETZA ER) 500 mg TG24 24 hour tablet take 1 tablet by mouth twice a day 60 Tab 2    ondansetron (ZOFRAN ODT) 4 mg disintegrating tablet Take 1 Tab by mouth every eight (8) hours as needed for Nausea. 30 Tab 0    bisoprolol-hydroCHLOROthiazide (ZIAC) 2.5-6.25 mg per tablet Take 1 Tab by mouth daily.  90 Tab 3    ALLERGY RELIEF, CETIRIZINE, 10 mg tablet take 1 tablet by mouth once daily  0    glucose blood VI test strips (ONETOUCH ULTRA TEST) strip Check blood glucose as directed 100 Strip 1       Past History     Past Medical History:  Past Medical History:   Diagnosis Date    Abdominal adhesions     Anemia     Asthma     Diabetes mellitus     Dyskinesia     bilateral    Essential hypertension     GERD (gastroesophageal reflux disease)     Hypertension     Menopause     Microhematuria     Rheumatoid arteritis 2018    Stool color black        Past Surgical History:  Past Surgical History:   Procedure Laterality Date    COLONOSCOPY N/A 1/21/2019    [facility]    HX CHOLECYSTECTOMY  6/28/10    HX COLONOSCOPY      HX ENDOSCOPY      HX HERNIA REPAIR      HX HYSTERECTOMY      Fibroids    HX KNEE REPLACEMENT Left     HX KNEE REPLACEMENT Right 06/2018    HX OOPHORECTOMY  12/2000    HX ORTHOPAEDIC Right     ankle- multiple surgeries    HX SMALL BOWEL RESECTION  12/2000    HX SMALL BOWEL RESECTION  02/21/2017    Dr. Claude Nightingale         Family History:  Family History   Problem Relation Age of Onset  Hypertension Other         parent    Breast Cancer Other 21    Heart Disease Father     Hypertension Father     Diabetes Father     Diabetes Mother     Hypertension Other         sibling    Diabetes Other         parent    Diabetes Sister     Kidney Disease Brother     Diabetes Brother        Social History:  Social History     Tobacco Use    Smoking status: Never Smoker    Smokeless tobacco: Never Used   Substance Use Topics    Alcohol use: No    Drug use: No       Allergies: Allergies   Allergen Reactions    Macrodantin [Nitrofurantoin Macrocrystalline] Itching and Other (comments)    Tape [Adhesive] Other (comments)     Paper tape-- feels like burning skin  Burned skin when had wound vac         Review of Systems       Review of Systems   Respiratory: Negative for shortness of breath. Cardiovascular: Negative for chest pain. Gastrointestinal: Positive for abdominal pain, diarrhea, nausea and vomiting. Negative for blood in stool.        - hematemesis   Neurological: Positive for light-headedness. Negative for syncope. All other systems reviewed and are negative. Physical Exam     Visit Vitals  /90 (BP 1 Location: Right arm, BP Patient Position: At rest)   Pulse 80   Temp 98.2 °F (36.8 °C)   Resp 18   Ht 5' 4\" (1.626 m)   Wt 72.6 kg (160 lb)   SpO2 100%   BMI 27.46 kg/m²         Physical Exam   Constitutional: She is oriented to person, place, and time. She appears well-developed and well-nourished. No distress. HENT:   Head: Normocephalic and atraumatic. Mouth/Throat: Oropharynx is clear and moist. Mucous membranes are dry. Eyes: Conjunctivae and EOM are normal. Pupils are equal, round, and reactive to light. No scleral icterus. Neck: Normal range of motion. Neck supple. Cardiovascular: Normal rate, regular rhythm and normal heart sounds. No murmur heard. Pulmonary/Chest: Effort normal and breath sounds normal. No respiratory distress. Abdominal: Soft.  Bowel sounds are normal. She exhibits no distension. There is tenderness in the suprapubic area. Musculoskeletal: She exhibits no edema. Lymphadenopathy:     She has no cervical adenopathy. Neurological: She is alert and oriented to person, place, and time. Coordination normal.   Skin: Skin is warm and dry. No rash noted. Psychiatric: She has a normal mood and affect. Her behavior is normal.   Nursing note and vitals reviewed. Diagnostic Study Results     Labs -  Recent Results (from the past 12 hour(s))   CBC WITH AUTOMATED DIFF    Collection Time: 03/05/19  5:45 PM   Result Value Ref Range    WBC 7.0 4.6 - 13.2 K/uL    RBC 5.11 4.20 - 5.30 M/uL    HGB 14.1 12.0 - 16.0 g/dL    HCT 44.4 35.0 - 45.0 %    MCV 86.9 74.0 - 97.0 FL    MCH 27.6 24.0 - 34.0 PG    MCHC 31.8 31.0 - 37.0 g/dL    RDW 15.5 (H) 11.6 - 14.5 %    PLATELET 342 886 - 517 K/uL    MPV 11.2 9.2 - 11.8 FL    NEUTROPHILS 58 40 - 73 %    LYMPHOCYTES 30 21 - 52 %    MONOCYTES 10 3 - 10 %    EOSINOPHILS 2 0 - 5 %    BASOPHILS 0 0 - 2 %    ABS. NEUTROPHILS 4.1 1.8 - 8.0 K/UL    ABS. LYMPHOCYTES 2.1 0.9 - 3.6 K/UL    ABS. MONOCYTES 0.7 0.05 - 1.2 K/UL    ABS. EOSINOPHILS 0.1 0.0 - 0.4 K/UL    ABS. BASOPHILS 0.0 0.0 - 0.1 K/UL    DF AUTOMATED     METABOLIC PANEL, COMPREHENSIVE    Collection Time: 03/05/19  5:45 PM   Result Value Ref Range    Sodium 140 136 - 145 mmol/L    Potassium 4.3 3.5 - 5.5 mmol/L    Chloride 105 100 - 108 mmol/L    CO2 30 21 - 32 mmol/L    Anion gap 5 3.0 - 18 mmol/L    Glucose 119 (H) 74 - 99 mg/dL    BUN 19 (H) 7.0 - 18 MG/DL    Creatinine 1.13 0.6 - 1.3 MG/DL    BUN/Creatinine ratio 17 12 - 20      GFR est AA 60 (L) >60 ml/min/1.73m2    GFR est non-AA 49 (L) >60 ml/min/1.73m2    Calcium 10.1 8.5 - 10.1 MG/DL    Bilirubin, total 0.3 0.2 - 1.0 MG/DL    ALT (SGPT) 22 13 - 56 U/L    AST (SGOT) 18 15 - 37 U/L    Alk.  phosphatase 143 (H) 45 - 117 U/L    Protein, total 8.2 6.4 - 8.2 g/dL    Albumin 4.3 3.4 - 5.0 g/dL    Globulin 3.9 2.0 - 4.0 g/dL    A-G Ratio 1.1 0.8 - 1.7     LIPASE    Collection Time: 03/05/19  5:45 PM   Result Value Ref Range    Lipase 328 73 - 393 U/L   URINALYSIS W/ RFLX MICROSCOPIC    Collection Time: 03/05/19  5:45 PM   Result Value Ref Range    Color YELLOW      Appearance CLEAR      Specific gravity >1.030 (H) 1.005 - 1.030    pH (UA) 5.0 5.0 - 8.0      Protein NEGATIVE  NEG mg/dL    Glucose >1,000 (A) NEG mg/dL    Ketone NEGATIVE  NEG mg/dL    Bilirubin NEGATIVE  NEG      Blood NEGATIVE  NEG      Urobilinogen 0.2 0.2 - 1.0 EU/dL    Nitrites NEGATIVE  NEG      Leukocyte Esterase NEGATIVE  NEG         Radiologic Studies -   No orders to display         Medical Decision Making   I am the first provider for this patient. I reviewed the vital signs, available nursing notes, past medical history, past surgical history, family history and social history. Vital Signs-Reviewed the patient's vital signs. Pulse Oximetry Analysis -  100% on room air (Interpretation) wnl    Cardiac Monitor:  Rate: 80        Records Reviewed: Nursing Notes (Time of Review: 5:30 PM)    ED Course: Progress Notes, Reevaluation, and Consults:        Provider Notes (Medical Decision Making):   MDM  Number of Diagnoses or Management Options  Diagnosis management comments: N/v/d in contact with others with similar symptoms mult previous abd surgeries however no clinical evidence of obstruction will check labs re hydrate        Amount and/or Complexity of Data Reviewed  Clinical lab tests: ordered                Diagnosis     Clinical Impression:   1. Non-intractable vomiting with nausea, unspecified vomiting type    2. Diarrhea, unspecified type    3. Abdominal pain, generalized        Disposition:     1900 : Pt care transferred to Dr. Radha Araiza  ,ED provider. History of patient complaint(s), available diagnostic reports and current treatment plan has been discussed thoroughly. Bedside rounding on patient occured : yes .   Intended disposition of patient : TBD  Pending diagnostics reports and/or labs (please list): Pending labs and re-eval         Medication List      ASK your doctor about these medications    albuterol 90 mcg/actuation inhaler  Commonly known as:  PROVENTIL HFA, VENTOLIN HFA, PROAIR HFA  Take 1 Puff by inhalation every six (6) hours as needed for Wheezing. ALLERGY RELIEF (CETIRIZINE) 10 mg tablet  Generic drug:  cetirizine     amLODIPine 2.5 mg tablet  Commonly known as:  NORVASC  take 1 tablet by mouth once daily NIGHTLY     bisoprolol-hydroCHLOROthiazide 2.5-6.25 mg per tablet  Commonly known as:  ZIAC  Take 1 Tab by mouth daily. FARXIGA 10 mg Tab tablet  Generic drug:  dapagliflozin  take 1 tablet by mouth once daily     folic acid 1 mg tablet  Commonly known as:  FOLVITE     glucose blood VI test strips strip  Commonly known as:  ONETOUCH ULTRA TEST  Check blood glucose as directed     HYDROcodone-acetaminophen 5-325 mg per tablet  Commonly known as:  NORCO  take 1 tablet by mouth every 4 hours if needed     metFORMIN 500 mg Tg24 24 hour tablet  Commonly known as:  GLUMETZA ER  take 1 tablet by mouth twice a day     methotrexate 2.5 mg tablet  Commonly known as:  RHEUMATREX     omeprazole 10 mg capsule  Commonly known as:  PRILOSEC  take 1 capsule by mouth once daily     ondansetron 4 mg disintegrating tablet  Commonly known as:  ZOFRAN ODT  Take 1 Tab by mouth every eight (8) hours as needed for Nausea. PLAQUENIL 200 mg tablet  Generic drug:  hydroxychloroquine     predniSONE 5 mg tablet  Commonly known as:  DELTASONE     promethazine-codeine 6.25-10 mg/5 mL syrup  Commonly known as:  PHENERGAN with CODEINE  Take 5 mL by mouth four (4) times daily as needed for Cough.  Max Daily Amount: 20 mL.     sulfaSALAzine 500 mg tablet  Commonly known as:  AZULFIDINE          _______________________________    Attestations:  Scribe Attestation     [employer] acting as a scribe for and in the presence of Eric Ayoub MD      March 05, 2019 at 7:45 PM       Provider Attestation:      I personally performed the services described in the documentation, reviewed the documentation, as recorded by the scribe in my presence, and it accurately and completely records my words and actions. March 05, 2019 at 7:45 PM - Leidy Santoyo MD    _______________________________    Note:  Assuming care of patient   from leaving provider    7:43 PM  Russ Ye MD, assumed care of patient from another provider who is ending their shift in the emergency department . 7:43 PM    Date: 3/5/2019  Patient Name: Reina Arndt    History of Presenting Illness     Chief Complaint   Patient presents with    Nausea    Vomiting    Vision Change       Nursing notes regarding the HPI and triage nursing notes were reviewed. Prior medical records were reviewed. Current Facility-Administered Medications   Medication Dose Route Frequency Provider Last Rate Last Dose    sodium chloride 0.9 % bolus infusion 1,000 mL  1,000 mL IntraVENous ONCE Leidy Santoyo MD 1,000 mL/hr at 03/05/19 1848 1,000 mL at 03/05/19 1848     Current Outpatient Medications   Medication Sig Dispense Refill    omeprazole (PRILOSEC) 10 mg capsule take 1 capsule by mouth once daily 30 Cap 11    HYDROcodone-acetaminophen (NORCO) 5-325 mg per tablet take 1 tablet by mouth every 4 hours if needed 12 Tab 0    FARXIGA 10 mg tab tablet take 1 tablet by mouth once daily 90 Tab 3    promethazine-codeine (PHENERGAN WITH CODEINE) 6.25-10 mg/5 mL syrup Take 5 mL by mouth four (4) times daily as needed for Cough. Max Daily Amount: 20 mL. 118 mL 0    predniSONE (DELTASONE) 5 mg tablet Take 10 mg by mouth two (2) times a day.  Alternate with 5 mg every other week      amLODIPine (NORVASC) 2.5 mg tablet take 1 tablet by mouth once daily NIGHTLY 90 Tab 1    albuterol (PROVENTIL HFA, VENTOLIN HFA, PROAIR HFA) 90 mcg/actuation inhaler Take 1 Puff by inhalation every six (6) hours as needed for Wheezing. 1 Inhaler 0    folic acid (FOLVITE) 1 mg tablet Take  by mouth daily.  hydroxychloroquine (PLAQUENIL) 200 mg tablet Take 200 mg by mouth daily.  methotrexate (RHEUMATREX) 2.5 mg tablet Take 5 mg by mouth every Tuesday.  sulfaSALAzine (AZULFIDINE) 500 mg tablet Take 500 mg by mouth two (2) times a day.  metFORMIN (GLUMETZA ER) 500 mg TG24 24 hour tablet take 1 tablet by mouth twice a day 60 Tab 2    ondansetron (ZOFRAN ODT) 4 mg disintegrating tablet Take 1 Tab by mouth every eight (8) hours as needed for Nausea. 30 Tab 0    bisoprolol-hydroCHLOROthiazide (ZIAC) 2.5-6.25 mg per tablet Take 1 Tab by mouth daily.  90 Tab 3    ALLERGY RELIEF, CETIRIZINE, 10 mg tablet take 1 tablet by mouth once daily  0    glucose blood VI test strips (ONETOUCH ULTRA TEST) strip Check blood glucose as directed 100 Strip 1       Past History     Past Medical History:  Past Medical History:   Diagnosis Date    Abdominal adhesions     Anemia     Asthma     Diabetes mellitus     Dyskinesia     bilateral    Essential hypertension     GERD (gastroesophageal reflux disease)     Hypertension     Menopause     Microhematuria     Rheumatoid arteritis 2018    Stool color black        Past Surgical History:  Past Surgical History:   Procedure Laterality Date    COLONOSCOPY N/A 1/21/2019    COL[facility]COPY performed by [facility] at [facility] ENDOSCOPY    HX CHOLECYSTECTOMY  6/28/10    HX COLONOSCOPY      HX ENDOSCOPY      HX HERNIA REPAIR      HX HYSTERECTOMY      Fibroids    HX KNEE REPLACEMENT Left     HX KNEE REPLACEMENT Right 06/2018    HX OOPHORECTOMY  12/2000    HX ORTHOPAEDIC Right     ankle- multiple surgeries    HX SMALL BOWEL RESECTION  12/2000    HX SMALL BOWEL RESECTION  02/21/2017    Dr. Claude Nightingale         Family History:  Family History   Problem Relation Age of Onset    Hypertension Other         parent    Breast Cancer Other 21    Heart Disease Father     Hypertension Father     Diabetes Father     Diabetes Mother     Hypertension Other         sibling    Diabetes Other         parent    Diabetes Sister     Kidney Disease Brother     Diabetes Brother        Social History:  Social History     Tobacco Use    Smoking status: Never Smoker    Smokeless tobacco: Never Used   Substance Use Topics    Alcohol use: No    Drug use: No       Allergies: Allergies   Allergen Reactions    Macrodantin [Nitrofurantoin Macrocrystalline] Itching and Other (comments)    Tape [Adhesive] Other (comments)     Paper tape-- feels like burning skin  Burned skin when had wound vac       Patient's primary care provider (as noted in EPIC):  Enio Calero MD    Abnormal lab results from this emergency department encounter:  Labs Reviewed   [address] DIFF - Abnormal; Notable for the following components:       Result Value    RDW 15.5 (*)     All other components within normal limits   METABOLIC PANEL, COMPREHENSIVE - Abnormal; Notable for the following components:    Glucose 119 (*)     BUN 19 (*)     GFR est AA 60 (*)     GFR est non-AA 49 (*)     Alk.  phosphatase 143 (*)     All other components within normal limits   URINALYSIS W/ RFLX MICROSCOPIC - Abnormal; Notable for the following components:    Specific gravity >1.030 (*)     Glucose >1,000 (*)     All other components within normal limits   LIPASE       Lab values for this patient within approximately the last 12 hours:  Recent Results (from the past 12 hour(s))   CBC WITH AUTOMATED DIFF    Collection Time: 03/05/19  5:45 PM   Result Value Ref Range    WBC 7.0 4.6 - 13.2 K/uL    RBC 5.11 4.20 - 5.30 M/uL    HGB 14.1 12.0 - 16.0 g/dL    HCT 44.4 35.0 - 45.0 %    MCV 86.9 74.0 - 97.0 FL    MCH 27.6 24.0 - 34.0 PG    MCHC 31.8 31.0 - 37.0 g/dL    RDW 15.5 (H) 11.6 - 14.5 %    PLATELET 450 856 - 632 K/uL    MPV 11.2 9.2 - 11.8 FL    NEUTROPHILS 58 40 - 73 %    LYMPHOCYTES 30 21 - 52 % MONOCYTES 10 3 - 10 %    EOSINOPHILS 2 0 - 5 %    BASOPHILS 0 0 - 2 %    ABS. NEUTROPHILS 4.1 1.8 - 8.0 K/UL    ABS. LYMPHOCYTES 2.1 0.9 - 3.6 K/UL    ABS. MONOCYTES 0.7 0.05 - 1.2 K/UL    ABS. EOSINOPHILS 0.1 0.0 - 0.4 K/UL    ABS. BASOPHILS 0.0 0.0 - 0.1 K/UL    DF AUTOMATED     METABOLIC PANEL, COMPREHENSIVE    Collection Time: 03/05/19  5:45 PM   Result Value Ref Range    Sodium 140 136 - 145 mmol/L    Potassium 4.3 3.5 - 5.5 mmol/L    Chloride 105 100 - 108 mmol/L    CO2 30 21 - 32 mmol/L    Anion gap 5 3.0 - 18 mmol/L    Glucose 119 (H) 74 - 99 mg/dL    BUN 19 (H) 7.0 - 18 MG/DL    Creatinine 1.13 0.6 - 1.3 MG/DL    BUN/Creatinine ratio 17 12 - 20      GFR est AA 60 (L) >60 ml/min/1.73m2    GFR est non-AA 49 (L) >60 ml/min/1.73m2    Calcium 10.1 8.5 - 10.1 MG/DL    Bilirubin, total 0.3 0.2 - 1.0 MG/DL    ALT (SGPT) 22 13 - 56 U/L    AST (SGOT) 18 15 - 37 U/L    Alk. phosphatase 143 (H) 45 - 117 U/L    Protein, total 8.2 6.4 - 8.2 g/dL    Albumin 4.3 3.4 - 5.0 g/dL    Globulin 3.9 2.0 - 4.0 g/dL    A-G Ratio 1.1 0.8 - 1.7     LIPASE    Collection Time: 03/05/19  5:45 PM   Result Value Ref Range    Lipase 328 73 - 393 U/L   URINALYSIS W/ RFLX MICROSCOPIC    Collection Time: 03/05/19  5:45 PM   Result Value Ref Range    Color YELLOW      Appearance CLEAR      Specific gravity >1.030 (H) 1.005 - 1.030    pH (UA) 5.0 5.0 - 8.0      Protein NEGATIVE  NEG mg/dL    Glucose >1,000 (A) NEG mg/dL    Ketone NEGATIVE  NEG mg/dL    Bilirubin NEGATIVE  NEG      Blood NEGATIVE  NEG      Urobilinogen 0.2 0.2 - 1.0 EU/dL    Nitrites NEGATIVE  NEG      Leukocyte Esterase NEGATIVE  NEG         Radiologist and cardiologist interpretations if available at time of this note:  Radiology results:  No results found.       Medication(s) ordered for patient during this emergency visit encounter:  Medications   sodium chloride 0.9 % bolus infusion 1,000 mL (1,000 mL IntraVENous New Bag 3/5/19 8729)   ondansetron (ZOFRAN) injection 4 mg (4 mg IntraVENous Given 3/5/19 1847)   ondansetron (ZOFRAN) injection 4 mg (4 mg IntraVENous Given 3/5/19 1915)   morphine injection 4 mg (4 mg IntraVENous Given 3/5/19 1915)   0.9% sodium chloride infusion (150 mL/hr IntraVENous New Bag 3/5/19 1919)       Pt care assumed from Dr. Priya Pollard , ED provider. Pt complaint(s), current treatment plan, progression and available diagnostic results have been discussed thoroughly. Rounding occurred: no  Intended Disposition: Home. Pending diagnostic reports and/or labs (please list): Serum labs and reassess patient. SPECIFIC PATIENT INSTRUCTIONS FROM THE PHYSICIAN WHO TREATED YOU IN THE ER TODAY:  1. Zofran for nausea and/or vomiting. 2. Over the counter imodium for diarrhea. IF lomotil was prescribed, take it for diarrhea not controlled after using imodium for at least 24 hours. 3. FOLLOW UP APPOINTMENT:  Your primary doctor in 3-4 days. Patient is improved, resting quietly and comfortably. The patient will be discharged home. The patient was reassured that these symptoms do not appear to represent a serious or life threatening condition at this time. Warning signs of worsening condition were discussed and understood by the patient. Based on patient's age, coexisting illness, exam, and the results of this ED evaluation, the decision to treat as an outpatient was made. Based on the information available at time of discharge, acute pathology requiring immediate intervention was deemed relative unlikely. While it is impossible to completely exclude the possibility of underlying serious disease or worsening of condition, I feel the relative likelihood is extremely low. I discussed this uncertainty with the patient, who understood ED evaluation and treatment and felt comfortable with the outpatient treatment plan. All questions regarding care, test results, and follow up were answered. The patient is stable and appropriate to discharge.  They understand that they should return to the emergency department for any new or worsening symptoms. I stressed the importance of follow up for repeat assessment and possibly further evaluation/treatment. Coding Diagnoses     Clinical Impression:   1. Non-intractable vomiting with nausea, unspecified vomiting type    2. Diarrhea, unspecified type    3. Abdominal pain, generalized        Disposition     Disposition:  Home. Tamar Ayala M.D.   [facility] Board Certified Emergency Physician

## 2019-03-06 NOTE — DISCHARGE INSTRUCTIONS
SPECIFIC PATIENT INSTRUCTIONS FROM THE PHYSICIAN WHO TREATED YOU IN THE ER TODAY:  1. Zofran for nausea and/or vomiting. 2. Over the counter imodium for diarrhea. IF lomotil was prescribed, take it for diarrhea not controlled after using imodium for at least 24 hours. 3. FOLLOW UP APPOINTMENT:  Your primary doctor in 3-4 days. Patient Education        Abdominal Pain: Care Instructions  Your Care Instructions    Abdominal pain has many possible causes. Some aren't serious and get better on their own in a few days. Others need more testing and treatment. If your pain continues or gets worse, you need to be rechecked and may need more tests to find out what is wrong. You may need surgery to correct the problem. Don't ignore new symptoms, such as fever, nausea and vomiting, urination problems, pain that gets worse, and dizziness. These may be signs of a more serious problem. Your doctor may have recommended a follow-up visit in the next 8 to 12 hours. If you are not getting better, you may need more tests or treatment. The doctor has checked you carefully, but problems can develop later. If you notice any problems or new symptoms, get medical treatment right away. Follow-up care is a key part of your treatment and safety. Be sure to make and go to all appointments, and call your doctor if you are having problems. It's also a good idea to know your test results and keep a list of the medicines you take. How can you care for yourself at home? · Rest until you feel better. · To prevent dehydration, drink plenty of fluids, enough so that your urine is light yellow or clear like water. Choose water and other caffeine-free clear liquids until you feel better. If you have kidney, heart, or liver disease and have to limit fluids, talk with your doctor before you increase the amount of fluids you drink. · If your stomach is upset, eat mild foods, such as rice, dry toast or crackers, bananas, and applesauce.  Try eating several small meals instead of two or three large ones. · Wait until 48 hours after all symptoms have gone away before you have spicy foods, alcohol, and drinks that contain caffeine. · Do not eat foods that are high in fat. · Avoid anti-inflammatory medicines such as aspirin, ibuprofen (Advil, Motrin), and naproxen (Aleve). These can cause stomach upset. Talk to your doctor if you take daily aspirin for another health problem. When should you call for help? Call 911 anytime you think you may need emergency care. For example, call if:    · You passed out (lost consciousness).     · You pass maroon or very bloody stools.     · You vomit blood or what looks like coffee grounds.     · You have new, severe belly pain.    Call your doctor now or seek immediate medical care if:    · Your pain gets worse, especially if it becomes focused in one area of your belly.     · You have a new or higher fever.     · Your stools are black and look like tar, or they have streaks of blood.     · You have unexpected vaginal bleeding.     · You have symptoms of a urinary tract infection. These may include:  ? Pain when you urinate. ? Urinating more often than usual.  ? Blood in your urine.     · You are dizzy or lightheaded, or you feel like you may faint.    Watch closely for changes in your health, and be sure to contact your doctor if:    · You are not getting better after 1 day (24 hours). Where can you learn more? Go to http://betzy-didi.info/. Enter O098 in the search box to learn more about \"Abdominal Pain: Care Instructions. \"  Current as of: September 23, 2018  Content Version: 11.9  © 9654-0873 [employer]. Care instructions adapted under license by [employer] (which disclaims liability or warranty for this information).  If you have questions about a medical condition or this instruction, always ask your healthcare professional. [address]  any warranty or liability for your use of this information. Patient Education        Diarrhea: Care Instructions  Your Care Instructions    Diarrhea is loose, watery stools (bowel movements). The exact cause is often hard to find. Sometimes diarrhea is your body's way of getting rid of what caused an upset stomach. Viruses, food poisoning, and many medicines can cause diarrhea. Some people get diarrhea in response to emotional stress, anxiety, or certain foods. Almost everyone has diarrhea now and then. It usually isn't serious, and your stools will return to normal soon. The important thing to do is replace the fluids you have lost, so you can prevent dehydration. The doctor has checked you carefully, but problems can develop later. If you notice any problems or new symptoms, get medical treatment right away. Follow-up care is a key part of your treatment and safety. Be sure to make and go to all appointments, and call your doctor if you are having problems. It's also a good idea to know your test results and keep a list of the medicines you take. How can you care for yourself at home? · Watch for signs of dehydration, which means your body has lost too much water. Dehydration is a serious condition and should be treated right away. Signs of dehydration are:  ? Increasing thirst and dry eyes and mouth. ? Feeling faint or lightheaded. ? Darker urine, and a smaller amount of urine than normal.  · To prevent dehydration, drink plenty of fluids, enough so that your urine is light yellow or clear like water. Choose water and other caffeine-free clear liquids until you feel better. If you have kidney, heart, or liver disease and have to limit fluids, talk with your doctor before you increase the amount of fluids you drink. · Begin eating small amounts of mild foods the next day, if you feel like it. ? Try yogurt that has live cultures of Lactobacillus. (Check the label.)  ?  Avoid spicy foods, fruits, alcohol, and caffeine until 48 hours after all symptoms are gone. ? Avoid chewing gum that contains sorbitol. ? Avoid dairy products (except for yogurt with Lactobacillus) while you have diarrhea and for 3 days after symptoms are gone. · The doctor may recommend that you take over-the-counter medicine, such as loperamide (Imodium), if you still have diarrhea after 6 hours. Read and follow all instructions on the label. Do not use this medicine if you have bloody diarrhea, a high fever, or other signs of serious illness. Call your doctor if you think you are having a problem with your medicine. When should you call for help? Call 911 anytime you think you may need emergency care. For example, call if:    · You passed out (lost consciousness).     · Your stools are maroon or very bloody.    Call your doctor now or seek immediate medical care if:    · You are dizzy or lightheaded, or you feel like you may faint.     · Your stools are black and look like tar, or they have streaks of blood.     · You have new or worse belly pain.     · You have symptoms of dehydration, such as:  ? Dry eyes and a dry mouth. ? Passing only a little dark urine. ? Feeling thirstier than usual.     · You have a new or higher fever.    Watch closely for changes in your health, and be sure to contact your doctor if:    · Your diarrhea is getting worse.     · You see pus in the diarrhea.     · You are not getting better after 2 days (48 hours). Where can you learn more? Go to http://betzy-didi.info/. Enter I272 in the search box to learn more about \"Diarrhea: Care Instructions. \"  Current as of: September 23, 2018  Content Version: 11.9  © 7270-6467 [employer]. Care instructions adapted under license by [employer] (which disclaims liability or warranty for this information).  If you have questions about a medical condition or this instruction, always ask your healthcare professional. [employer], [facility] disclaims any warranty or liability for your use of this information. Patient Education        Nausea and Vomiting: Care Instructions  Your Care Instructions    When you are nauseated, you may feel weak and sweaty and notice a lot of saliva in your mouth. Nausea often leads to vomiting. Most of the time you do not need to worry about nausea and vomiting, but they can be signs of other illnesses. Two common causes of nausea and vomiting are stomach flu and food poisoning. Nausea and vomiting from viral stomach flu will usually start to improve within 24 hours. Nausea and vomiting from food poisoning may last from 12 to 48 hours. The doctor has checked you carefully, but problems can develop later. If you notice any problems or new symptoms, get medical treatment right away. Follow-up care is a key part of your treatment and safety. Be sure to make and go to all appointments, and call your doctor if you are having problems. It's also a good idea to know your test results and keep a list of the medicines you take. How can you care for yourself at home? · To prevent dehydration, drink plenty of fluids, enough so that your urine is light yellow or clear like water. Choose water and other caffeine-free clear liquids until you feel better. If you have kidney, heart, or liver disease and have to limit fluids, talk with your doctor before you increase the amount of fluids you drink. · Rest in bed until you feel better. · When you are able to eat, try clear soups, mild foods, and liquids until all symptoms are gone for 12 to 48 hours. Other good choices include dry toast, crackers, cooked cereal, and gelatin dessert, such as Jell-O. When should you call for help? Call 911 anytime you think you may need emergency care.  For example, call if:    · You passed out (lost consciousness).    Call your doctor now or seek immediate medical care if:    · You have symptoms of dehydration, such as:  ? Dry eyes and a dry mouth. ? Passing only a little dark urine. ? Feeling thirstier than usual.     · You have new or worsening belly pain.     · You have a new or higher fever.     · You vomit blood or what looks like coffee grounds.    Watch closely for changes in your health, and be sure to contact your doctor if:    · You have ongoing nausea and vomiting.     · Your vomiting is getting worse.     · Your vomiting lasts longer than 2 days.     · You are not getting better as expected. Where can you learn more? Go to http://betzy-didi.info/. Enter 25 010096 in the search box to learn more about \"Nausea and Vomiting: Care Instructions. \"  Current as of: September 23, 2018  Content Version: 11.9  © 6961-4390 [employer]. Care instructions adapted under license by [employer] (which disclaims liability or warranty for this information). If you have questions about a medical condition or this instruction, always ask your healthcare professional. [place] [address] any warranty or liability for your use of this information. [employer] Activation    Thank you for requesting access to [employer]. Please follow the instructions below to securely access and download your online medical record. [employer] allows you to send messages to your doctor, view your test results, renew your prescriptions, schedule appointments, and more. How Do I Sign Up? 1. In your internet browser, go to [email or web address]. 2. Click on the First Time User? Click Here link in the Sign In box. You will see the New Member Sign Up page. 3. Enter your [employer] Access Code exactly as it appears below. You will not need to use this code after youve completed the sign-up process. If you do not sign up before the expiration date, you must request a new code.     [employer] Access Code: Dann Aceves  Expires: 4/7/2019  4:26 PM (This is the date your [employer] access code will )    4. Enter the last four digits of your Social Security Number (xxxx) and Date of Birth (mm/dd/yyyy) as indicated and click Submit. You will be taken to the next sign-up page. 5. Create a [employer] ID. This will be your [employer] login ID and cannot be changed, so think of one that is secure and easy to remember. 6. Create a [employer] password. You can change your password at any time. 7. Enter your Password Reset Question and Answer. This can be used at a later time if you forget your password. 8. Enter your e-mail address. You will receive e-mail notification when new information is available in [address] 9. Click Sign Up. You can now view and download portions of your medical record. 10. Click the Download Summary menu link to download a portable copy of your medical information. Additional Information    If you have questions, please visit the Frequently Asked Questions section of the [employer] website at [email or web address] Remember, [employer] is NOT to be used for urgent needs. For medical emergencies, dial 911.

## 2019-03-15 ENCOUNTER — OFFICE VISIT (OUTPATIENT)
Dept: FAMILY MEDICINE CLINIC | Age: 59
End: 2019-03-15

## 2019-03-15 VITALS
HEIGHT: 64 IN | DIASTOLIC BLOOD PRESSURE: 90 MMHG | OXYGEN SATURATION: 97 % | RESPIRATION RATE: 18 BRPM | TEMPERATURE: 98.9 F | BODY MASS INDEX: 26.98 KG/M2 | WEIGHT: 158 LBS | HEART RATE: 89 BPM | SYSTOLIC BLOOD PRESSURE: 124 MMHG

## 2019-03-15 DIAGNOSIS — G89.29 INSOMNIA SECONDARY TO CHRONIC PAIN: ICD-10-CM

## 2019-03-15 DIAGNOSIS — G89.29 CHRONIC ABDOMINAL PAIN: Primary | ICD-10-CM

## 2019-03-15 DIAGNOSIS — R10.9 CHRONIC ABDOMINAL PAIN: Primary | ICD-10-CM

## 2019-03-15 DIAGNOSIS — G47.01 INSOMNIA SECONDARY TO CHRONIC PAIN: ICD-10-CM

## 2019-03-15 DIAGNOSIS — G56.03 BILATERAL CARPAL TUNNEL SYNDROME: ICD-10-CM

## 2019-03-15 DIAGNOSIS — I10 ESSENTIAL HYPERTENSION: Chronic | ICD-10-CM

## 2019-03-15 RX ORDER — DOXEPIN HYDROCHLORIDE 10 MG/1
10 CAPSULE ORAL
Qty: 30 CAP | Refills: 11 | Status: SHIPPED | OUTPATIENT
Start: 2019-03-15 | End: 2019-10-07

## 2019-03-15 NOTE — LETTER
NOTIFICATION RETURN TO WORK  
 
3/15/2019 10:02 AM 
 
Ms. Pasha Ba [address] [facility] [address] [id number] To Whom It May Concern: 
 
Pasha Ba is currently under the care of [address] [place] [address]. She will return to work on: 3/18/2019 If there are questions or concerns please have the patient contact our office. Sincerely, Milan Kenney MD

## 2019-03-15 NOTE — PROGRESS NOTES
Chief Complaint   Patient presents with    Abdominal Pain     1. Have you been to the ER, urgent care clinic since your last visit? Hospitalized since your last visit? Yes When: 2/21/19 Where: [facility] Reason for visit: Abd. pain, Nausea, hip pain, knee pain    2. Have you seen or consulted any other health care providers outside of the [address] [place] [address] since your last visit? Include any pap smears or colon screening.  No

## 2019-03-15 NOTE — PROGRESS NOTES
Teo Valentine is a 61 y.o.  female and presents with    Chief Complaint   Patient presents with    Abdominal Pain     Subjective:  [employer] c/o abdominal pain which is similar to previous episodes; she was evaluated in the ER for vomiting and diarrhea 6 days ago and prescribed antiemetic. She has minimally loose stool remaining. She denies fever; intermittent chills    Hypertension  Patient is here for follow-up of hypertension. She indicates that she is feeling well and denies any symptoms referable to her hypertension. She is exercising and is not adherent to low salt diet. Blood pressure is well controlled at home. Use of agents associated with hypertension: none. She notes worsening blood pressure with stress and she reports that work is more stressful. She has headache associated elevated blood pressure. She is followed by rheumatology. ROS   General ROS: negative for - chills or fever  Psychological ROS: negative for - anxiety or depression  Ophthalmic ROS: positive for - uses glasses  ENT ROS: negative for - headaches  Endocrine ROS: positive for - polydipsia/polyuria  Respiratory ROS: no cough, shortness of breath, or wheezing  Cardiovascular ROS: no chest pain or dyspnea on exertion  Gastrointestinal ROS: positive for - abdominal pain  negative for - constipation or heartburn  Genito-Urinary ROS: no dysuria, trouble voiding, or hematuria  Neurological ROS: no TIA or stroke symptoms  Dermatological ROS: negative for - rash or skin lesion changes    All other systems reviewed and are negative. Objective:  Vitals:    03/15/19 0938   BP: 124/90   Pulse: 89   Resp: 18   Temp: 98.9 °F (37.2 °C)   TempSrc: Oral   SpO2: 97%   Weight: 158 lb (71.7 kg)   Height: 5' 4\" (1.626 m)   PainSc:   8   PainLoc: Abdomen        Constitutional: She is oriented to person, place, and time. She appears well-developed and well-nourished.  She appears anxious   HENT:   Head: Normocephalic and atraumatic. Mouth/Throat: Oropharynx is clear and moist.   Eyes: conjunctival erythema  Neck: Neck supple. No tracheal deviation present. Cardiovascular: Regular rhythm.    No murmur heard. Pulmonary/Chest: Effort normal and breath sounds normal. No respiratory distress. Abdominal: Soft. No tenderness  Musculoskeletal: right knee with pain with motion  Neurological: antalgic gait; No gross neuro deficit    Assessment/Plan:    1. Insomnia secondary to chronic pain  Start TCA  - doxepin (SINEQUAN) 10 mg capsule; Take 1 Cap by mouth nightly. Dispense: 30 Cap; Refill: 11    2. Chronic abdominal pain  Note for work; instructed to rest and use analgesics    3. Essential hypertension  Goal <130/80    4. Bilateral carpal tunnel syndrome  S/p corticosteroid injections  - REFERRAL TO ORTHOPEDIC SURGERY      Lab review: no lab studies available for review at time of visit      I have discussed the diagnosis with the patient and the intended plan as seen in the above orders. The patient has received an after-visit summary and questions were answered concerning future plans. I have discussed medication side effects and warnings with the patient as well. I have reviewed the plan of care with the patient, accepted their input and they are in agreement with the treatment goals. Follow-up Disposition:  Return if symptoms worsen or fail to improve.

## 2019-05-08 ENCOUNTER — APPOINTMENT (OUTPATIENT)
Dept: CT IMAGING | Age: 59
End: 2019-05-08
Attending: PHYSICIAN ASSISTANT
Payer: COMMERCIAL

## 2019-05-08 ENCOUNTER — HOSPITAL ENCOUNTER (EMERGENCY)
Age: 59
Discharge: HOME | End: 2019-05-08
Attending: EMERGENCY MEDICINE
Payer: COMMERCIAL

## 2019-05-08 VITALS
HEART RATE: 98 BPM | SYSTOLIC BLOOD PRESSURE: 156 MMHG | RESPIRATION RATE: 18 BRPM | OXYGEN SATURATION: 98 % | TEMPERATURE: 98.3 F | DIASTOLIC BLOOD PRESSURE: 101 MMHG

## 2019-05-08 DIAGNOSIS — R07.89 MUSCULOSKELETAL CHEST PAIN: ICD-10-CM
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
## 2019-05-08 PROCEDURE — 93005 ELECTROCARDIOGRAM TRACING: CPT

## 2019-05-08 PROCEDURE — 71250 CT THORAX DX C-: CPT

## 2019-05-08 PROCEDURE — 99284 EMERGENCY DEPT VISIT MOD MDM: CPT

## 2019-05-08 PROCEDURE — 74011250636 HC RX REV CODE- 250/636: Performed by: PHYSICIAN ASSISTANT

## 2019-05-08 PROCEDURE — 96374 THER/PROPH/DIAG INJ IV PUSH: CPT

## 2019-05-08 RX ORDER — CYCLOBENZAPRINE HCL 10 MG
10 TABLET ORAL
Qty: 20 TAB | Refills: 0 | Status: SHIPPED | OUTPATIENT
Start: 2019-05-08 | End: 2019-05-28 | Stop reason: SDUPTHER

## 2019-05-08 RX ORDER — MORPHINE SULFATE 4 MG/ML
4 INJECTION INTRAVENOUS
Status: COMPLETED | OUTPATIENT
Start: 2019-05-08 | End: 2019-05-08

## 2019-05-08 RX ADMIN — MORPHINE SULFATE 4 MG: 4 INJECTION INTRAVENOUS at 18:50
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
## 2019-05-08 NOTE — LETTER
[address] [facility] EMERGENCY DEPT 
[address] [facility] [address] [id number] 
[phone] Work/School Note Date: 5/8/2019 To Whom It May concern: 
 
Lore Chinchilla was seen and treated today in the emergency room by the following provider(s): 
Physician Assistant: Curt Jeter PA-C. Lore Chinchilla may return to work on 5/11/2019 or sooner if symptoms markedly improve. Sincerely, Geremias Laboy PA-C
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
## 2019-05-09 LAB
ATRIAL RATE: 96 BPM
CALCULATED P AXIS, ECG09: 62 DEGREES
CALCULATED R AXIS, ECG10: 40 DEGREES
CALCULATED T AXIS, ECG11: 64 DEGREES
DIAGNOSIS, 93000: NORMAL
P-R INTERVAL, ECG05: 128 MS
Q-T INTERVAL, ECG07: 350 MS
QRS DURATION, ECG06: 70 MS
QTC CALCULATION (BEZET), ECG08: 442 MS
VENTRICULAR RATE, ECG03: 96 BPM

## 2019-05-15 ENCOUNTER — HOSPITAL ENCOUNTER (EMERGENCY)
Age: 59
Discharge: HOME | End: 2019-05-16
Attending: EMERGENCY MEDICINE
Payer: COMMERCIAL

## 2019-05-15 DIAGNOSIS — M54.5 CHRONIC LOW BACK PAIN, UNSPECIFIED BACK PAIN LATERALITY, WITH SCIATICA PRESENCE UNSPECIFIED: ICD-10-CM

## 2019-05-15 DIAGNOSIS — G89.29 CHRONIC LOW BACK PAIN, UNSPECIFIED BACK PAIN LATERALITY, WITH SCIATICA PRESENCE UNSPECIFIED: ICD-10-CM
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
## 2019-05-15 DIAGNOSIS — R19.7 DIARRHEA, UNSPECIFIED TYPE: ICD-10-CM

## 2019-05-15 LAB
APPEARANCE UR: CLEAR
BILIRUB UR QL: NEGATIVE
COLOR UR: YELLOW
GLUCOSE UR STRIP.AUTO-MCNC: >1000 MG/DL
HGB UR QL STRIP: NEGATIVE
KETONES UR QL STRIP.AUTO: ABNORMAL MG/DL
LEUKOCYTE ESTERASE UR QL STRIP.AUTO: NEGATIVE
NITRITE UR QL STRIP.AUTO: NEGATIVE
PH UR STRIP: 5 [PH] (ref 5–8)
PROT UR STRIP-MCNC: NEGATIVE MG/DL
SP GR UR REFRACTOMETRY: >1.03 (ref 1–1.03)
UROBILINOGEN UR QL STRIP.AUTO: 0.2 EU/DL (ref 0.2–1)

## 2019-05-15 PROCEDURE — 81003 URINALYSIS AUTO W/O SCOPE: CPT

## 2019-05-15 PROCEDURE — 99283 EMERGENCY DEPT VISIT LOW MDM: CPT

## 2019-05-15 PROCEDURE — 74011250637 HC RX REV CODE- 250/637: Performed by: EMERGENCY MEDICINE

## 2019-05-15 RX ORDER — CAMPHOR
SPIRIT TOPICAL
Qty: 177 ML | Refills: 0 | Status: SHIPPED | OUTPATIENT
Start: 2019-05-15 | End: 2019-07-30

## 2019-05-15 RX ORDER — DIPHENHYDRAMINE HCL 25 MG
25 CAPSULE ORAL
Qty: 30 CAP | Refills: 0 | Status: SHIPPED | OUTPATIENT
Start: 2019-05-15 | End: 2019-05-25

## 2019-05-15 RX ORDER — DIPHENHYDRAMINE HCL 25 MG
25 CAPSULE ORAL
Status: COMPLETED | OUTPATIENT
Start: 2019-05-15 | End: 2019-05-15

## 2019-05-15 RX ORDER — HYDROCODONE BITARTRATE AND ACETAMINOPHEN 5; 325 MG/1; MG/1
1 TABLET ORAL
Status: COMPLETED | OUTPATIENT
Start: 2019-05-15 | End: 2019-05-16

## 2019-05-15 RX ADMIN — DIPHENHYDRAMINE HYDROCHLORIDE 25 MG: 25 CAPSULE ORAL at 23:45

## 2019-05-16 VITALS
RESPIRATION RATE: 16 BRPM | OXYGEN SATURATION: 99 % | BODY MASS INDEX: 27.29 KG/M2 | WEIGHT: 159 LBS | TEMPERATURE: 98.2 F | DIASTOLIC BLOOD PRESSURE: 98 MMHG | SYSTOLIC BLOOD PRESSURE: 144 MMHG | HEART RATE: 94 BPM

## 2019-05-16 PROCEDURE — 74011250637 HC RX REV CODE- 250/637: Performed by: EMERGENCY MEDICINE

## 2019-05-16 RX ADMIN — HYDROCODONE BITARTRATE AND ACETAMINOPHEN 1 TABLET: 5; 325 TABLET ORAL at 00:00

## 2019-05-16 NOTE — ED PROVIDER NOTES
Sharee Harvey is a 61 y.o. Female with history of chronic pain with complaints of bites and itching on her arms and buttocks. No one else at home has these. Patient also has had diarrhea today and increased lower back pain along with urinary frequency. The back pain is not a significantly different. Patient no fever or vomiting today. No other new symptoms. She has not take anything to help with itching. The history is provided by the patient. Past Medical History:  
Diagnosis Date  Abdominal adhesions  Anemia  Asthma  Diabetes mellitus  Dyskinesia   
 bilateral  
 Essential hypertension  GERD (gastroesophageal reflux disease)  Hypertension  Menopause  Microhematuria  Rheumatoid arteritis 2018  Stool color black Past Surgical History:  
Procedure Laterality Date  COLONOSCOPY N/A 1/21/2019 COLONOSCOPY performed by Mani Early MD at [address]  6/28/10  
 HX COLONOSCOPY    
 HX ENDOSCOPY    
 HX HERNIA REPAIR    
 HX HYSTERECTOMY Fibroids  HX KNEE REPLACEMENT Left  HX KNEE REPLACEMENT Right 06/2018  HX OOPHORECTOMY  12/2000  HX ORTHOPAEDIC Right   
 ankle- multiple surgeries  HX SMALL BOWEL RESECTION  12/2000  HX SMALL BOWEL RESECTION  02/21/2017 Dr. Misty Jaimes [address] [facility] Family History:  
Problem Relation Age of Onset  Hypertension Other   
     parent  Breast Cancer Other 21  
 Heart Disease Father  Hypertension Father  Diabetes Father  Diabetes Mother  Hypertension Other   
     sibling  Diabetes Other   
     parent  Diabetes Sister  Kidney Disease Brother  Diabetes Brother Social History Socioeconomic History  Marital status:  Spouse name: Not on file  Number of children: Not on file  Years of education: Not on file  Highest education level: Not on file Occupational History  Not on file Social Needs  Financial resource strain: Not on file  Food insecurity:  
  Worry: Not on file Inability: Not on file  Transportation needs:  
  Medical: Not on file Non-medical: Not on file Tobacco Use  Smoking status: Never Smoker  Smokeless tobacco: Never Used Substance and Sexual Activity  Alcohol use: No  
 Drug use: No  
 Sexual activity: Yes  
  Partners: Male Birth control/protection: None Lifestyle  Physical activity:  
  Days per week: Not on file Minutes per session: Not on file  Stress: Not on file Relationships  Social connections:  
  Talks on phone: Not on file Gets together: Not on file Attends [employer] service: Not on file Active member of club or organization: Not on file Attends meetings of clubs or organizations: Not on file Relationship status: Not on file  Intimate partner violence:  
  Fear of current or ex partner: Not on file Emotionally abused: Not on file Physically abused: Not on file Forced sexual activity: Not on file Other Topics Concern  Not on file Social History Narrative  Not on file ALLERGIES: Macrodantin [nitrofurantoin macrocrystalline] and Tape [adhesive] Review of Systems Constitutional: Negative for fever. HENT: Negative for sore throat. Eyes: Negative for visual disturbance. Respiratory: Negative for shortness of breath. Cardiovascular: Negative for chest pain. Gastrointestinal: Positive for abdominal pain. Endocrine: Negative for polyuria. Genitourinary: Positive for frequency. Negative for difficulty urinating and hematuria. Musculoskeletal: Positive for back pain. Negative for gait problem. Skin: Positive for rash. Allergic/Immunologic: Negative for immunocompromised state. Neurological: Negative for syncope. Psychiatric/Behavioral: Positive for sleep disturbance. Vitals: 05/15/19 2158 05/15/19 2205 BP: (!) 148/102 Pulse: (!) 115 (!) 103 Resp: 16 Temp: 98.2 °F (36.8 °C) SpO2: 99% Weight: 72.1 kg (159 lb) Physical Exam  
Constitutional: She is oriented to person, place, and time. She appears well-developed and well-nourished. No distress. HENT:  
Head: Normocephalic and atraumatic. Right Ear: External ear normal.  
Left Ear: External ear normal.  
Nose: Nose normal.  
Mouth/Throat: Uvula is midline, oropharynx is clear and moist and mucous membranes are normal.  
Eyes: Conjunctivae are normal. No scleral icterus. Neck: Neck supple. Cardiovascular: Normal rate, regular rhythm, normal heart sounds and intact distal pulses. Pulmonary/Chest: Effort normal and breath sounds normal.  
Abdominal: Soft. There is no tenderness. Musculoskeletal: She exhibits no edema. Neurological: She is alert and oriented to person, place, and time. Gait normal.  
Skin: Skin is warm and dry. Capillary refill takes less than 2 seconds. Rash noted. She is not diaphoretic. Insect bites along arms lower back. There are no pustules or vesicles. No signs of abscesses Psychiatric: Her behavior is normal.  
Nursing note and vitals reviewed. MDM Procedures Vitals: 
Patient Vitals for the past 12 hrs: 
 Temp Pulse Resp BP SpO2  
05/15/19 2205  (!) 103     
05/15/19 2158 98.2 °F (36.8 °C) (!) 115 16 (!) 148/102 99 % Medications ordered:  
Medications HYDROcodone-acetaminophen (NORCO) 5-325 mg per tablet 1 Tab (has no administration in time range) diphenhydrAMINE (BENADRYL) capsule 25 mg (25 mg Oral Given 5/15/19 2345) Lab findings: 
Recent Results (from the past 12 hour(s)) URINALYSIS W/ RFLX MICROSCOPIC Collection Time: 05/15/19 10:10 PM  
Result Value Ref Range Color YELLOW Appearance CLEAR Specific gravity >1.030 (H) 1.005 - 1.030  
 pH (UA) 5.0 5.0 - 8.0 Protein NEGATIVE  NEG mg/dL Glucose >1,000 (A) NEG mg/dL Ketone TRACE (A) NEG mg/dL Bilirubin NEGATIVE  NEG Blood NEGATIVE  NEG Urobilinogen 0.2 0.2 - 1.0 EU/dL Nitrites NEGATIVE  NEG Leukocyte Esterase NEGATIVE  NEG    
 
 
EKG interpretation by ED Physician: X-Ray, CT or other radiology findings or impressions: No orders to display Progress notes, Consult notes or additional Procedure notes: Do not feel patient requires other work-up at this time. We will treat for her insect bites I have discussed with patient and/or family/sig other the results, interpretation of any imaging if performed, suspected diagnosis and treatment plan to include instructions regarding the diagnoses listed to which understanding was expressed with all questions answered Reevaluation of patient:  
stable Disposition: 
Diagnosis: 1. Bug bite, initial encounter 2. Chronic low back pain, unspecified back pain laterality, with sciatica presence unspecified 3. Diarrhea, unspecified type Disposition: home Follow-up Information Follow up With Specialties Details Why Contact Info Rafy Davis MD Family Practice, Internal Medicine Schedule an appointment as soon as possible for a visit  [address] [place] [phone] Patient's Medications Start Taking CALAMINE-ZINC OXIDE (CALAMINE) SOLUTION    aaa tid prn itching DIPHENHYDRAMINE (BENADRYL) 25 MG CAPSULE    Take 1 Cap by mouth every six (6) hours as needed for Itching for up to 10 days. Continue Taking ALBUTEROL (PROVENTIL HFA, VENTOLIN HFA, PROAIR HFA) 90 MCG/ACTUATION INHALER    Take 1 Puff by inhalation every six (6) hours as needed for Wheezing. ALLERGY RELIEF, CETIRIZINE, 10 MG TABLET    take 1 tablet by mouth once daily AMLODIPINE (NORVASC) 2.5 MG TABLET    take 1 tablet by mouth once daily NIGHTLY BISOPROLOL-HYDROCHLOROTHIAZIDE (ZIAC) 2.5-6.25 MG PER TABLET    Take 1 Tab by mouth daily. CYCLOBENZAPRINE (FLEXERIL) 10 MG TABLET    Take 1 Tab by mouth three (3) times daily as needed for Muscle Spasm(s). DOXEPIN (SINEQUAN) 10 MG CAPSULE    Take 1 Cap by mouth nightly. FARXIGA 10 MG TAB TABLET    take 1 tablet by mouth once daily FOLIC ACID (FOLVITE) 1 MG TABLET    Take  by mouth daily. GLUCOSE BLOOD VI TEST STRIPS (ONETOUCH ULTRA TEST) STRIP    Check blood glucose as directed HYDROCODONE-ACETAMINOPHEN (NORCO) 5-325 MG PER TABLET    take 1 tablet by mouth every 4 hours if needed HYDROXYCHLOROQUINE (PLAQUENIL) 200 MG TABLET    Take 200 mg by mouth daily. METFORMIN (GLUMETZA ER) 500 MG TG24 24 HOUR TABLET    take 1 tablet by mouth twice a day METHOTREXATE (RHEUMATREX) 2.5 MG TABLET    Take 5 mg by mouth every Tuesday. OMEPRAZOLE (PRILOSEC) 10 MG CAPSULE    take 1 capsule by mouth once daily ONDANSETRON HCL (ZOFRAN) 4 MG TABLET    Take 2 Tabs by mouth every eight (8) hours as needed for Nausea. PROMETHAZINE-CODEINE (PHENERGAN WITH CODEINE) 6.25-10 MG/5 ML SYRUP    Take 5 mL by mouth four (4) times daily as needed for Cough. Max Daily Amount: 20 mL. SULFASALAZINE (AZULFIDINE) 500 MG TABLET    Take 500 mg by mouth two (2) times a day. These Medications have changed No medications on file Stop Taking No medications on file

## 2019-05-16 NOTE — DISCHARGE INSTRUCTIONS
Patient Education        Learning About Relief for Back Pain  What is back tension and strain? Back strain happens when you overstretch, or pull, a muscle in your back. You may hurt your back in an accident or when you exercise or lift something. Most back pain will get better with rest and time. You can take care of yourself at home to help your back heal.  What can you do first to relieve back pain? When you first feel back pain, try these steps:  · Walk. Take a short walk (10 to 20 minutes) on a level surface (no slopes, hills, or stairs) every 2 to 3 hours. Walk only distances you can manage without pain, especially leg pain. · Relax. Find a comfortable position for rest. Some people are comfortable on the floor or a medium-firm bed with a small pillow under their head and another under their knees. Some people prefer to lie on their side with a pillow between their knees. Don't stay in one position for too long. · Try heat or ice. Try using a heating pad on a low or medium setting, or take a warm shower, for 15 to 20 minutes every 2 to 3 hours. Or you can buy single-use heat wraps that last up to 8 hours. You can also try an ice pack for 10 to 15 minutes every 2 to 3 hours. You can use an ice pack or a bag of frozen vegetables wrapped in a thin towel. There is not strong evidence that either heat or ice will help, but you can try them to see if they help. You may also want to try switching between heat and cold. · Take pain medicine exactly as directed. ? If the doctor gave you a prescription medicine for pain, take it as prescribed. ? If you are not taking a prescription pain medicine, ask your doctor if you can take an over-the-counter medicine. What else can you do? · Stretch and exercise. Exercises that increase flexibility may relieve your pain and make it easier for your muscles to keep your spine in a good, neutral position. And don't forget to keep walking. · Do self-massage.  You can use self-massage to unwind after work or school or to energize yourself in the morning. You can easily massage your feet, hands, or neck. Self-massage works best if you are in comfortable clothes and are sitting or lying in a comfortable position. Use oil or lotion to massage bare skin. · Reduce stress. Back pain can lead to a vicious [place]: Distress about the pain tenses the muscles in your back, which in turn causes more pain. Learn how to relax your mind and your muscles to lower your stress. Where can you learn more? Go to http://betzyInbiomotiondidi.info/. Enter A785 in the search box to learn more about \"Learning About Relief for Back Pain. \"  Current as of: September 20, 2018  Content Version: 11.9  © 9500-6114 [employer]. Care instructions adapted under license by [employer] (which disclaims liability or warranty for this information). If you have questions about a medical condition or this instruction, always ask your healthcare professional. [place] [address] any warranty or liability for your use of this information. Patient Education        Diarrhea: Care Instructions  Your Care Instructions    Diarrhea is loose, watery stools (bowel movements). The exact cause is often hard to find. Sometimes diarrhea is your body's way of getting rid of what caused an upset stomach. Viruses, food poisoning, and many medicines can cause diarrhea. Some people get diarrhea in response to emotional stress, anxiety, or certain foods. Almost everyone has diarrhea now and then. It usually isn't serious, and your stools will return to normal soon. The important thing to do is replace the fluids you have lost, so you can prevent dehydration. The doctor has checked you carefully, but problems can develop later. If you notice any problems or new symptoms, get medical treatment right away. Follow-up care is a key part of your treatment and safety.  Be sure to make and go to all appointments, and call your doctor if you are having problems. It's also a good idea to know your test results and keep a list of the medicines you take. How can you care for yourself at home? · Watch for signs of dehydration, which means your body has lost too much water. Dehydration is a serious condition and should be treated right away. Signs of dehydration are:  ? Increasing thirst and dry eyes and mouth. ? Feeling faint or lightheaded. ? Darker urine, and a smaller amount of urine than normal.  · To prevent dehydration, drink plenty of fluids, enough so that your urine is light yellow or clear like water. Choose water and other caffeine-free clear liquids until you feel better. If you have kidney, heart, or liver disease and have to limit fluids, talk with your doctor before you increase the amount of fluids you drink. · Begin eating small amounts of mild foods the next day, if you feel like it. ? Try yogurt that has live cultures of Lactobacillus. (Check the label.)  ? Avoid spicy foods, fruits, alcohol, and caffeine until 48 hours after all symptoms are gone. ? Avoid chewing gum that contains sorbitol. ? Avoid dairy products (except for yogurt with Lactobacillus) while you have diarrhea and for 3 days after symptoms are gone. · The doctor may recommend that you take over-the-counter medicine, such as loperamide (Imodium), if you still have diarrhea after 6 hours. Read and follow all instructions on the label. Do not use this medicine if you have bloody diarrhea, a high fever, or other signs of serious illness. Call your doctor if you think you are having a problem with your medicine. When should you call for help? Call 911 anytime you think you may need emergency care.  For example, call if:    · You passed out (lost consciousness).     · Your stools are maroon or very bloody.    Call your doctor now or seek immediate medical care if:    · You are dizzy or lightheaded, or you feel like you may faint.     · Your stools are black and look like tar, or they have streaks of blood.     · You have new or worse belly pain.     · You have symptoms of dehydration, such as:  ? Dry eyes and a dry mouth. ? Passing only a little dark urine. ? Feeling thirstier than usual.     · You have a new or higher fever.    Watch closely for changes in your health, and be sure to contact your doctor if:    · Your diarrhea is getting worse.     · You see pus in the diarrhea.     · You are not getting better after 2 days (48 hours). Where can you learn more? Go to http://betzy-didi.info/. Enter M316 in the search box to learn more about \"Diarrhea: Care Instructions. \"  Current as of: September 23, 2018  Content Version: 11.9  © 0381-8040 [employer]. Care instructions adapted under license by [employer] (which disclaims liability or warranty for this information). If you have questions about a medical condition or this instruction, always ask your healthcare professional. [place] [address] any warranty or liability for your use of this information. Patient Education        Chronic Pain: Care Instructions  Your Care Instructions    Chronic pain is pain that lasts a long time (months or even years) and may or may not have a clear cause. It is different from acute pain, which usually does have a clear cause--like an injury or illness--and gets better over time. Chronic pain:  · Lasts over time but may vary from day to day. · Does not go away despite efforts to end it. · May disrupt your sleep and lead to fatigue. · May cause depression or anxiety. · May make your muscles tense, causing more pain. · Can disrupt your work, hobbies, home life, and relationships with friends and family. Chronic pain is a very real condition. It is not just in your head.  Treatment can help and usually includes several methods used together, such as medicines, physical therapy, exercise, and other treatments. Learning how to relax and changing negative thought patterns can also help you cope. Chronic pain is complex. Taking an active role in your treatment will help you better manage your pain. Tell your doctor if you have trouble dealing with your pain. You may have to try several things before you find what works best for you. Follow-up care is a key part of your treatment and safety. Be sure to make and go to all appointments, and call your doctor if you are having problems. It's also a good idea to know your test results and keep a list of the medicines you take. How can you care for yourself at home? · Pace yourself. Break up large jobs into smaller tasks. Save harder tasks for days when you have less pain, or go back and forth between hard tasks and easier ones. Take rest breaks. · Relax, and reduce stress. Relaxation techniques such as deep breathing or meditation can help. · Keep moving. Gentle, daily exercise can help reduce pain over the long run. Try low- or no-impact exercises such as walking, swimming, and stationary biking. Do stretches to stay flexible. · Try heat, cold packs, and massage. · Get enough sleep. Chronic pain can make you tired and drain your energy. Talk with your doctor if you have trouble sleeping because of pain. · Think positive. Your thoughts can affect your pain level. Do things that you enjoy to distract yourself when you have pain instead of focusing on the pain. See a movie, read a book, listen to music, or spend time with a friend. · If you think you are depressed, talk to your doctor about treatment. · Keep a daily pain diary. Record how your moods, thoughts, sleep patterns, activities, and medicine affect your pain. You may find that your pain is worse during or after certain activities or when you are feeling a certain emotion.  Having a record of your pain can help you and your doctor find the best ways to treat your pain.  · Take pain medicines exactly as directed. ? If the doctor gave you a prescription medicine for pain, take it as prescribed. ? If you are not taking a prescription pain medicine, ask your doctor if you can take an over-the-counter medicine. Reducing constipation caused by pain medicine  · Include fruits, vegetables, beans, and whole grains in your diet each day. These foods are high in fiber. · Drink plenty of fluids, enough so that your urine is light yellow or clear like water. If you have kidney, heart, or liver disease and have to limit fluids, talk with your doctor before you increase the amount of fluids you drink. · If your doctor recommends it, get more exercise. Walking is a good choice. Bit by bit, increase the amount you walk every day. Try for at least 30 minutes on most days of the week. · Schedule time each day for a bowel movement. A daily routine may help. Take your time and do not strain when having a bowel movement. When should you call for help? Call your doctor now or seek immediate medical care if:    · Your pain gets worse or is out of control.     · You feel down or blue, or you do not enjoy things like you once did. You may be depressed, which is common in people with chronic pain. Depression can be treated.     · You have vomiting or cramps for more than 2 hours.    Watch closely for changes in your health, and be sure to contact your doctor if:    · You cannot sleep because of pain.     · You are very worried or anxious about your pain.     · You have trouble taking your pain medicine.     · You have any concerns about your pain medicine.     · You have trouble with bowel movements, such as:  ? No bowel movement in 3 days. ? Blood in the anal area, in your stool, or on the toilet paper. ? Diarrhea for more than 24 hours. Where can you learn more? Go to http://betzy-didi.info/.   Enter N004 in the search box to learn more about \"Chronic Pain: Care Instructions. \"  Current as of: Alexa 3, 2018  Content Version: 11.9  © 1910-0872 [employer], [facility]. Care instructions adapted under license by [employer] (which disclaims liability or warranty for this information). If you have questions about a medical condition or this instruction, always ask your healthcare professional. [place] [address] any warranty or liability for your use of this information.
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
## 2019-05-16 NOTE — ED NOTES
PT A&OX4, patent airway, non-labored breathing, PERRLA, C/O chronic back pain, \"possible bug bites\" to bilateral arms and buttocks.

## 2019-05-21 ENCOUNTER — TELEPHONE (OUTPATIENT)
Dept: OBGYN CLINIC | Age: 59
End: 2019-05-21

## 2019-05-27 ENCOUNTER — HOSPITAL ENCOUNTER (EMERGENCY)
Age: 59
Discharge: HOME | End: 2019-05-27
Attending: EMERGENCY MEDICINE | Admitting: EMERGENCY MEDICINE
Payer: COMMERCIAL

## 2019-05-27 VITALS
OXYGEN SATURATION: 100 % | HEART RATE: 87 BPM | SYSTOLIC BLOOD PRESSURE: 143 MMHG | RESPIRATION RATE: 20 BRPM | DIASTOLIC BLOOD PRESSURE: 86 MMHG | TEMPERATURE: 98.3 F

## 2019-05-27 DIAGNOSIS — R10.9 CHRONIC ABDOMINAL PAIN: Primary | ICD-10-CM

## 2019-05-27 DIAGNOSIS — S30.814A VAGINAL ABRASION, INITIAL ENCOUNTER: ICD-10-CM

## 2019-05-27 DIAGNOSIS — G89.29 CHRONIC ABDOMINAL PAIN: Primary | ICD-10-CM

## 2019-05-27 LAB
ALBUMIN SERPL-MCNC: 3.4 G/DL (ref 3.4–5)
ALBUMIN/GLOB SERPL: 0.8 {RATIO} (ref 0.8–1.7)
ALP SERPL-CCNC: 149 U/L (ref 45–117)
ALT SERPL-CCNC: 15 U/L (ref 13–56)
ANION GAP SERPL CALC-SCNC: 11 MMOL/L (ref 3–18)
APPEARANCE UR: CLEAR
AST SERPL-CCNC: 13 U/L (ref 15–37)
BASOPHILS # BLD: 0 K/UL (ref 0–0.1)
BASOPHILS NFR BLD: 0 % (ref 0–2)
BILIRUB SERPL-MCNC: 0.3 MG/DL (ref 0.2–1)
BILIRUB UR QL: NEGATIVE
BUN SERPL-MCNC: 18 MG/DL (ref 7–18)
BUN/CREAT SERPL: 13 (ref 12–20)
CALCIUM SERPL-MCNC: 9.1 MG/DL (ref 8.5–10.1)
CHLORIDE SERPL-SCNC: 100 MMOL/L (ref 100–108)
CO2 SERPL-SCNC: 27 MMOL/L (ref 21–32)
COLOR UR: YELLOW
CREAT SERPL-MCNC: 1.39 MG/DL (ref 0.6–1.3)
DIFFERENTIAL METHOD BLD: NORMAL
EOSINOPHIL # BLD: 0.1 K/UL (ref 0–0.4)
EOSINOPHIL NFR BLD: 2 % (ref 0–5)
ERYTHROCYTE [DISTWIDTH] IN BLOOD BY AUTOMATED COUNT: 14.5 % (ref 11.6–14.5)
GLOBULIN SER CALC-MCNC: 4.2 G/DL (ref 2–4)
GLUCOSE SERPL-MCNC: 243 MG/DL (ref 74–99)
GLUCOSE UR STRIP.AUTO-MCNC: >1000 MG/DL
HCT VFR BLD AUTO: 44.3 % (ref 35–45)
HGB BLD-MCNC: 14 G/DL (ref 12–16)
HGB UR QL STRIP: NEGATIVE
KETONES UR QL STRIP.AUTO: NEGATIVE MG/DL
LEUKOCYTE ESTERASE UR QL STRIP.AUTO: NEGATIVE
LIPASE SERPL-CCNC: 128 U/L (ref 73–393)
LYMPHOCYTES # BLD: 1.4 K/UL (ref 0.9–3.6)
LYMPHOCYTES NFR BLD: 28 % (ref 21–52)
MAGNESIUM SERPL-MCNC: 2.1 MG/DL (ref 1.6–2.6)
MCH RBC QN AUTO: 27.3 PG (ref 24–34)
MCHC RBC AUTO-ENTMCNC: 31.6 G/DL (ref 31–37)
MCV RBC AUTO: 86.4 FL (ref 74–97)
MONOCYTES # BLD: 0.3 K/UL (ref 0.05–1.2)
MONOCYTES NFR BLD: 6 % (ref 3–10)
NEUTS SEG # BLD: 3.3 K/UL (ref 1.8–8)
NEUTS SEG NFR BLD: 64 % (ref 40–73)
NITRITE UR QL STRIP.AUTO: NEGATIVE
PH UR STRIP: 5 [PH] (ref 5–8)
PLATELET # BLD AUTO: 353 K/UL (ref 135–420)
PMV BLD AUTO: 10.1 FL (ref 9.2–11.8)
POTASSIUM SERPL-SCNC: 4 MMOL/L (ref 3.5–5.5)
PROT SERPL-MCNC: 7.6 G/DL (ref 6.4–8.2)
PROT UR STRIP-MCNC: NEGATIVE MG/DL
RBC # BLD AUTO: 5.13 M/UL (ref 4.2–5.3)
SODIUM SERPL-SCNC: 138 MMOL/L (ref 136–145)
SP GR UR REFRACTOMETRY: 1.02 (ref 1–1.03)
UROBILINOGEN UR QL STRIP.AUTO: 0.2 EU/DL (ref 0.2–1)
WBC # BLD AUTO: 5.2 K/UL (ref 4.6–13.2)

## 2019-05-27 PROCEDURE — 85025 COMPLETE CBC W/AUTO DIFF WBC: CPT

## 2019-05-27 PROCEDURE — 81003 URINALYSIS AUTO W/O SCOPE: CPT

## 2019-05-27 PROCEDURE — 80053 COMPREHEN METABOLIC PANEL: CPT

## 2019-05-27 PROCEDURE — 96374 THER/PROPH/DIAG INJ IV PUSH: CPT

## 2019-05-27 PROCEDURE — 99284 EMERGENCY DEPT VISIT MOD MDM: CPT

## 2019-05-27 PROCEDURE — 83735 ASSAY OF MAGNESIUM: CPT

## 2019-05-27 PROCEDURE — 96375 TX/PRO/DX INJ NEW DRUG ADDON: CPT

## 2019-05-27 PROCEDURE — 83690 ASSAY OF LIPASE: CPT

## 2019-05-27 PROCEDURE — 74011250636 HC RX REV CODE- 250/636: Performed by: EMERGENCY MEDICINE

## 2019-05-27 PROCEDURE — 96361 HYDRATE IV INFUSION ADD-ON: CPT

## 2019-05-27 RX ORDER — ONDANSETRON 4 MG/1
TABLET, ORALLY DISINTEGRATING ORAL
Qty: 10 TAB | Refills: 0 | Status: SHIPPED | OUTPATIENT
Start: 2019-05-27 | End: 2019-06-26 | Stop reason: ALTCHOICE

## 2019-05-27 RX ORDER — ONDANSETRON 4 MG/1
8 TABLET, FILM COATED ORAL
Qty: 12 TAB | Refills: 0 | Status: SHIPPED | OUTPATIENT
Start: 2019-05-27 | End: 2019-06-26

## 2019-05-27 RX ORDER — MORPHINE SULFATE 2 MG/ML
4 INJECTION, SOLUTION INTRAMUSCULAR; INTRAVENOUS ONCE
Status: COMPLETED | OUTPATIENT
Start: 2019-05-27 | End: 2019-05-27

## 2019-05-27 RX ORDER — ONDANSETRON 2 MG/ML
4 INJECTION INTRAMUSCULAR; INTRAVENOUS
Status: COMPLETED | OUTPATIENT
Start: 2019-05-27 | End: 2019-05-27

## 2019-05-27 RX ADMIN — MORPHINE SULFATE 4 MG: 2 INJECTION, SOLUTION INTRAMUSCULAR; INTRAVENOUS at 13:20

## 2019-05-27 RX ADMIN — ONDANSETRON 4 MG: 2 INJECTION INTRAMUSCULAR; INTRAVENOUS at 13:20

## 2019-05-27 RX ADMIN — SODIUM CHLORIDE 1000 ML: 900 INJECTION, SOLUTION INTRAVENOUS at 13:21

## 2019-05-27 NOTE — DISCHARGE INSTRUCTIONS

## 2019-05-27 NOTE — ED PROVIDER NOTES
EMERGENCY DEPARTMENT HISTORY AND PHYSICAL EXAM    1:49 PM      Date: 5/27/2019  Patient Name: Gómez Ludwig    History of Presenting Illness     Chief Complaint   Patient presents with    Abdominal Pain    Nausea         History Provided By: Patient      Additional History (Context): Gómez Ludwig is a 61 y.o. female who presents with right lower quadrant abdominal pain starting last night vaginal itching and initial hematuria which resolved spontaneously now presents with increased pain. She did have an episode of vomiting this morning denies any diarrhea constipation. Denies any fevers chills chest pain shortness of breath or syncope. PCP: Kelly Crabtree MD      Current Outpatient Medications   Medication Sig Dispense Refill    ondansetron hcl (ZOFRAN) 4 mg tablet Take 2 Tabs by mouth every eight (8) hours as needed for Nausea. 12 Tab 0    ondansetron (ZOFRAN ODT) 4 mg disintegrating tablet Take 1-2 tablets every 6-8 hours as needed for nausea and vomiting. 10 Tab 0    calamine-zinc oxide (CALAMINE) solution aaa tid prn itching 177 mL 0    cyclobenzaprine (FLEXERIL) 10 mg tablet Take 1 Tab by mouth three (3) times daily as needed for Muscle Spasm(s). 20 Tab 0    doxepin (SINEQUAN) 10 mg capsule Take 1 Cap by mouth nightly. 30 Cap 11    omeprazole (PRILOSEC) 10 mg capsule take 1 capsule by mouth once daily 30 Cap 11    HYDROcodone-acetaminophen (NORCO) 5-325 mg per tablet take 1 tablet by mouth every 4 hours if needed 12 Tab 0    FARXIGA 10 mg tab tablet take 1 tablet by mouth once daily 90 Tab 3    promethazine-codeine (PHENERGAN WITH CODEINE) 6.25-10 mg/5 mL syrup Take 5 mL by mouth four (4) times daily as needed for Cough. Max Daily Amount: 20 mL.  118 mL 0    amLODIPine (NORVASC) 2.5 mg tablet take 1 tablet by mouth once daily NIGHTLY 90 Tab 1    albuterol (PROVENTIL HFA, VENTOLIN HFA, PROAIR HFA) 90 mcg/actuation inhaler Take 1 Puff by inhalation every six (6) hours as needed for Wheezing. 1 Inhaler 0    folic acid (FOLVITE) 1 mg tablet Take  by mouth daily.  hydroxychloroquine (PLAQUENIL) 200 mg tablet Take 200 mg by mouth daily.  methotrexate (RHEUMATREX) 2.5 mg tablet Take 5 mg by mouth every Tuesday.  sulfaSALAzine (AZULFIDINE) 500 mg tablet Take 500 mg by mouth two (2) times a day.  metFORMIN (GLUMETZA ER) 500 mg TG24 24 hour tablet take 1 tablet by mouth twice a day 60 Tab 2    bisoprolol-hydroCHLOROthiazide (ZIAC) 2.5-6.25 mg per tablet Take 1 Tab by mouth daily.  90 Tab 3    ALLERGY RELIEF, CETIRIZINE, 10 mg tablet take 1 tablet by mouth once daily  0    glucose blood VI test strips (ONETOUCH ULTRA TEST) strip Check blood glucose as directed 100 Strip 1       Past History     Past Medical History:  Past Medical History:   Diagnosis Date    Abdominal adhesions     Anemia     Asthma     Diabetes mellitus     Dyskinesia     bilateral    Essential hypertension     GERD (gastroesophageal reflux disease)     Hypertension     Menopause     Microhematuria     Rheumatoid arteritis 2018    Stool color black        Past Surgical History:  Past Surgical History:   Procedure Laterality Date    COLONOSCOPY N/A 1/21/2019    COLON[facility] performed [facility]ait[facility] Garvin [facility] at [facility] ENDOSCOPY    HX CHOLECYSTECTOMY  6/28/10    HX COLONOSCOPY      HX ENDOSCOPY      HX HERNIA REPAIR      HX HYSTERECTOMY      Fibroids    HX KNEE REPLACEMENT Left     HX KNEE REPLACEMENT Right 06/2018    HX OOPHORECTOMY  12/2000    HX ORTHOPAEDIC Right     ankle- multiple surgeries    HX SMALL BOWEL RESECTION  12/2000    HX SMALL BOWEL RESECTION  02/21/2017    Dr. Lito Gramajo         Family History:  Family History   Problem Relation Age of Onset    Hypertension Other         parent    Breast Cancer Other 21    Heart Disease Father     Hypertension Father     Diabetes Father     Diabetes Mother     Hypertension Other         sibling    Diabetes Other         parent    Diabetes Sister     Kidney Disease Brother     Diabetes Brother        Social History:  Social History     Tobacco Use    Smoking status: Never Smoker    Smokeless tobacco: Never Used   Substance Use Topics    Alcohol use: No    Drug use: No       Allergies: Allergies   Allergen Reactions    Macrodantin [Nitrofurantoin Macrocrystalline] Itching and Other (comments)    Tape [Adhesive] Other (comments)     Paper tape-- feels like burning skin  Burned skin when had wound vac         Review of Systems       Review of Systems   Constitutional: Negative for chills, diaphoresis and fever. HENT: Negative for congestion. Eyes: Negative for visual disturbance. Respiratory: Negative for cough, chest tightness and shortness of breath. Cardiovascular: Negative for chest pain. Gastrointestinal: Positive for abdominal pain, nausea and vomiting. Negative for diarrhea. Genitourinary: Positive for dysuria and hematuria. Musculoskeletal: Negative for back pain. Skin: Negative for rash. Neurological: Negative for dizziness, syncope and weakness. All other systems reviewed and are negative. Physical Exam     Visit Vitals  /86   Pulse 87   Temp 98.3 °F (36.8 °C)   Resp 20   SpO2 100%       Physical Exam   Constitutional: She is oriented to person, place, and time. She appears well-developed and well-nourished. No distress. Patient was lying flat in no acute distress without yelling out until exam she began yelling complaining of her abdominal pain when distracted her abdomen is soft questionable tenderness   HENT:   Head: Normocephalic and atraumatic. Mouth/Throat: Oropharynx is clear and moist.   Eyes: Pupils are equal, round, and reactive to light. Conjunctivae and EOM are normal. No scleral icterus. Neck: Normal range of motion. Neck supple. Cardiovascular: Normal rate, regular rhythm and normal heart sounds. No murmur heard.   Pulmonary/Chest: Effort normal and breath sounds normal. No respiratory distress. Abdominal: Soft. Bowel sounds are normal. She exhibits no distension. There is no tenderness. Musculoskeletal: She exhibits no edema. Lymphadenopathy:     She has no cervical adenopathy. Neurological: She is alert and oriented to person, place, and time. Coordination normal.   Skin: Skin is warm and dry. No rash noted. Psychiatric: She has a normal mood and affect. Her behavior is normal.   Nursing note and vitals reviewed. Diagnostic Study Results     Labs -  Recent Results (from the past 12 hour(s))   CBC WITH AUTOMATED DIFF    Collection Time: 05/27/19  1:15 PM   Result Value Ref Range    WBC 5.2 4.6 - 13.2 K/uL    RBC 5.13 4.20 - 5.30 M/uL    HGB 14.0 12.0 - 16.0 g/dL    HCT 44.3 35.0 - 45.0 %    MCV 86.4 74.0 - 97.0 FL    MCH 27.3 24.0 - 34.0 PG    MCHC 31.6 31.0 - 37.0 g/dL    RDW 14.5 11.6 - 14.5 %    PLATELET 801 861 - 923 K/uL    MPV 10.1 9.2 - 11.8 FL    NEUTROPHILS 64 40 - 73 %    LYMPHOCYTES 28 21 - 52 %    MONOCYTES 6 3 - 10 %    EOSINOPHILS 2 0 - 5 %    BASOPHILS 0 0 - 2 %    ABS. NEUTROPHILS 3.3 1.8 - 8.0 K/UL    ABS. LYMPHOCYTES 1.4 0.9 - 3.6 K/UL    ABS. MONOCYTES 0.3 0.05 - 1.2 K/UL    ABS. EOSINOPHILS 0.1 0.0 - 0.4 K/UL    ABS. BASOPHILS 0.0 0.0 - 0.1 K/UL    DF AUTOMATED     METABOLIC PANEL, COMPREHENSIVE    Collection Time: 05/27/19  1:15 PM   Result Value Ref Range    Sodium 138 136 - 145 mmol/L    Potassium 4.0 3.5 - 5.5 mmol/L    Chloride 100 100 - 108 mmol/L    CO2 27 21 - 32 mmol/L    Anion gap 11 3.0 - 18 mmol/L    Glucose 243 (H) 74 - 99 mg/dL    BUN 18 7.0 - 18 MG/DL    Creatinine 1.39 (H) 0.6 - 1.3 MG/DL    BUN/Creatinine ratio 13 12 - 20      GFR est AA 47 (L) >60 ml/min/1.73m2    GFR est non-AA 39 (L) >60 ml/min/1.73m2    Calcium 9.1 8.5 - 10.1 MG/DL    Bilirubin, total 0.3 0.2 - 1.0 MG/DL    ALT (SGPT) 15 13 - 56 U/L    AST (SGOT) 13 (L) 15 - 37 U/L    Alk.  phosphatase 149 (H) 45 - 117 U/L    Protein, total 7.6 6.4 - 8.2 g/dL    Albumin 3.4 3.4 - 5.0 g/dL    Globulin 4.2 (H) 2.0 - 4.0 g/dL    A-G Ratio 0.8 0.8 - 1.7     LIPASE    Collection Time: 05/27/19  1:15 PM   Result Value Ref Range    Lipase 128 73 - 393 U/L   MAGNESIUM    Collection Time: 05/27/19  1:15 PM   Result Value Ref Range    Magnesium 2.1 1.6 - 2.6 mg/dL   URINALYSIS W/ RFLX MICROSCOPIC    Collection Time: 05/27/19  2:33 PM   Result Value Ref Range    Color YELLOW      Appearance CLEAR      Specific gravity 1.017 1.005 - 1.030      pH (UA) 5.0 5.0 - 8.0      Protein NEGATIVE  NEG mg/dL    Glucose >1,000 (A) NEG mg/dL    Ketone NEGATIVE  NEG mg/dL    Bilirubin NEGATIVE  NEG      Blood NEGATIVE  NEG      Urobilinogen 0.2 0.2 - 1.0 EU/dL    Nitrites NEGATIVE  NEG      Leukocyte Esterase NEGATIVE  NEG         Radiologic Studies -   No orders to display         Medical Decision Making   I am the first provider for this patient. I reviewed the vital signs, available nursing notes, past medical history, past surgical history, family history and social history. Vital Signs-Reviewed the patient's vital signs. EKG:    Records Reviewed: Nursing Notes and Old Medical Records (Time of Review: 1:49 PM)    ED Course: Progress Notes, Reevaluation, and Consults:      Provider Notes (Medical Decision Making):   MDM  Number of Diagnoses or Management Options  Chronic abdominal pain:   Vaginal abrasion, initial encounter:   Diagnosis management comments: Abdominal pain with history of chronic abdominal pain when asked if this was her chronic abdominal pain she said yes it feels very similar to it however she did have increased vomiting will check UA to rule out a UTI check labs is elevated consider imaging      3:02 PM  Patient complaint of vaginal bleeding. On vaginal exam there is no blood noted in the vaginal vault.   There is a 2 mm abrasion on the left dorsal surface of the labia majora with minimal blood appears to be a very small abrasion which is most likely the source of the bleeding. Labs been reviewed negative patient is resting quietly in the emergency department until some comes up to the bedside and then starts feeling pain I believe this is more towards attention and interest in additional pain medications than an acute finding. Will DC home follow-up with PCP       Amount and/or Complexity of Data Reviewed  Clinical lab tests: ordered and reviewed                Diagnosis     Clinical Impression:   1. Chronic abdominal pain    2. Vaginal abrasion, initial encounter        Disposition: home     Follow-up Information     Follow up With Specialties Details Why [facility] EMERGENCY DEPT Emergency Medicine  As needed, If symptoms worsen [address]  [phone]    Paulina Sherman MD Family Practice, Internal Medicine Schedule an appointment as soon as possible for a visit for ED follow up  [facility]  [address] [facility]      Favio Franco MD Obstetrics & Gynecology, Gynecology, Obstetrics Schedule an appointment as soon as possible for a visit for ED follow up appointment  [place] [address]  [phone]             Patient's Medications   Start Taking    ONDANSETRON (ZOFRAN ODT) 4 MG DISINTEGRATING TABLET    Take 1-2 tablets every 6-8 hours as needed for nausea and vomiting. Continue Taking    ALBUTEROL (PROVENTIL HFA, VENTOLIN HFA, PROAIR HFA) 90 MCG/ACTUATION INHALER    Take 1 Puff by inhalation every six (6) hours as needed for Wheezing. ALLERGY RELIEF, CETIRIZINE, 10 MG TABLET    take 1 tablet by mouth once daily    AMLODIPINE (NORVASC) 2.5 MG TABLET    take 1 tablet by mouth once daily NIGHTLY    BISOPROLOL-HYDROCHLOROTHIAZIDE (ZIAC) 2.5-6.25 MG PER TABLET    Take 1 Tab by mouth daily.     CALAMINE-ZINC OXIDE (CALAMINE) SOLUTION    aaa tid prn itching    CYCLOBENZAPRINE (FLEXERIL) 10 MG TABLET Take 1 Tab by mouth three (3) times daily as needed for Muscle Spasm(s). DOXEPIN (SINEQUAN) 10 MG CAPSULE    Take 1 Cap by mouth nightly. FARXIGA 10 MG TAB TABLET    take 1 tablet by mouth once daily    FOLIC ACID (FOLVITE) 1 MG TABLET    Take  by mouth daily. GLUCOSE BLOOD VI TEST STRIPS (ONETOUCH ULTRA TEST) STRIP    Check blood glucose as directed    HYDROCODONE-ACETAMINOPHEN (NORCO) 5-325 MG PER TABLET    take 1 tablet by mouth every 4 hours if needed    HYDROXYCHLOROQUINE (PLAQUENIL) 200 MG TABLET    Take 200 mg by mouth daily. METFORMIN (GLUMETZA ER) 500 MG TG24 24 HOUR TABLET    take 1 tablet by mouth twice a day    METHOTREXATE (RHEUMATREX) 2.5 MG TABLET    Take 5 mg by mouth every Tuesday. OMEPRAZOLE (PRILOSEC) 10 MG CAPSULE    take 1 capsule by mouth once daily    PROMETHAZINE-CODEINE (PHENERGAN WITH CODEINE) 6.25-10 MG/5 ML SYRUP    Take 5 mL by mouth four (4) times daily as needed for Cough. Max Daily Amount: 20 mL. SULFASALAZINE (AZULFIDINE) 500 MG TABLET    Take 500 mg by mouth two (2) times a day. These Medications have changed    Modified Medication Previous Medication    ONDANSETRON HCL (ZOFRAN) 4 MG TABLET ondansetron hcl (ZOFRAN) 4 mg tablet       Take 2 Tabs by mouth every eight (8) hours as needed for Nausea. Take 2 Tabs by mouth every eight (8) hours as needed for Nausea. Stop Taking    No medications on file     _______________________________    Please note that this dictation was completed with [employer], the [employer] voice recognition software. Quite often unanticipated grammatical, syntax, homophones, and other interpretive errors are inadvertently transcribed by the computer software. Please disregard these errors. Please excuse any errors that have escaped final proofreading.

## 2019-05-28 ENCOUNTER — OFFICE VISIT (OUTPATIENT)
Dept: FAMILY MEDICINE CLINIC | Age: 59
End: 2019-05-28

## 2019-05-28 VITALS
OXYGEN SATURATION: 100 % | HEART RATE: 84 BPM | RESPIRATION RATE: 17 BRPM | WEIGHT: 163.8 LBS | DIASTOLIC BLOOD PRESSURE: 79 MMHG | HEIGHT: 64 IN | SYSTOLIC BLOOD PRESSURE: 119 MMHG | TEMPERATURE: 97.5 F | BODY MASS INDEX: 27.96 KG/M2

## 2019-05-28 DIAGNOSIS — M17.12 PRIMARY OSTEOARTHRITIS OF LEFT KNEE: ICD-10-CM

## 2019-05-28 DIAGNOSIS — T38.0X5A STEROID-INDUCED DIABETES (HCC): ICD-10-CM

## 2019-05-28 DIAGNOSIS — I10 ESSENTIAL HYPERTENSION: Chronic | ICD-10-CM

## 2019-05-28 DIAGNOSIS — L30.9 DERMATITIS: Primary | ICD-10-CM

## 2019-05-28 DIAGNOSIS — E09.9 STEROID-INDUCED DIABETES (HCC): ICD-10-CM

## 2019-05-28 RX ORDER — BISOPROLOL FUMARATE AND HYDROCHLOROTHIAZIDE 2.5; 6.25 MG/1; MG/1
1 TABLET ORAL DAILY
Qty: 90 TAB | Refills: 3 | Status: SHIPPED | OUTPATIENT
Start: 2019-05-28 | End: 2020-06-15

## 2019-05-28 RX ORDER — CETIRIZINE HYDROCHLORIDE 10 MG/1
TABLET, FILM COATED ORAL
Qty: 90 TAB | Refills: 3 | Status: ON HOLD | OUTPATIENT
Start: 2019-05-28 | End: 2020-09-12

## 2019-05-28 RX ORDER — METFORMIN HYDROCHLORIDE 500 MG/1
TABLET, FILM COATED, EXTENDED RELEASE ORAL
Qty: 60 TAB | Refills: 2 | Status: SHIPPED | OUTPATIENT
Start: 2019-05-28 | End: 2019-07-22 | Stop reason: SDUPTHER

## 2019-05-28 RX ORDER — TRIAMCINOLONE ACETONIDE 40 MG/ML
40 INJECTION, SUSPENSION INTRA-ARTICULAR; INTRAMUSCULAR ONCE
Qty: 1 ML | Refills: 0
Start: 2019-05-28 | End: 2019-05-28

## 2019-05-28 RX ORDER — CYCLOBENZAPRINE HCL 10 MG
10 TABLET ORAL
Qty: 20 TAB | Refills: 0 | Status: SHIPPED | OUTPATIENT
Start: 2019-05-28 | End: 2019-07-22 | Stop reason: SDUPTHER

## 2019-05-28 NOTE — PROGRESS NOTES
Melva Santoyo is a 61 y.o female that is present in the office for a same day appointment for rash on arms and back. 1. Have you been to the ER, urgent care clinic since your last visit? Hospitalized since your last visit? Yes When: [facility] ER 5/27/2019; 5/21/2019; 05/15/2019; 05/08/2019    2. Have you seen or consulted any other health care providers outside of the [address] [place] [address] since your last visit? Include any pap smears or colon screening.  No     Health Maintenance Due   Topic Date Due    DTaP/Tdap/Td series (1 - Tdap) 02/04/1981    PAP AKA CERVICAL CYTOLOGY  02/04/1981    Shingrix Vaccine Age 50> (1 of 2) 02/04/2010    HEMOGLOBIN A1C Q6M  02/23/2019    EYE EXAM RETINAL OR DILATED  05/29/2019    FOOT EXAM Q1  06/12/2019    MICROALBUMIN Q1  06/12/2019    LIPID PANEL Q1  06/12/2019

## 2019-05-28 NOTE — PROGRESS NOTES
Mayank Rodriguez is a 61 y.o.  female and presents with    Chief Complaint   Patient presents with    Rash     Subjective:  Rash  Patient complains of rash involving the armsbilateral, back. Rash started a few weeks ago. Appearance of rash at onset: Color of lesion(s): pink. Rash has changed over time Initial distribution: arms bilateral  Discomfort associated with rash: pruritic. Associated symptoms: no associated symptoms. Denies: fever, cough, congestion, sore throat, headache, arthralgia, abdominal pain, nausea, vomiting, myalgia, crankiness. Patient has had previous evaluation of rash. Patient has had previous treatment. Response to treatment: mild improvement. Patient has not had contacts with similar rash. Patient has not identified precipitant. Patient has not had new exposures (soaps, lotions, laundry detergents, foods, medications, plants, insects or animals.)    ROS   Constitutional: Negative for fever. HENT: Negative for sore throat. Eyes: Negative for visual disturbance. Respiratory: Negative for shortness of breath. Cardiovascular: Negative for chest pain. Gastrointestinal: Positive for abdominal pain. Endocrine: Negative for polyuria. Genitourinary: Positive for frequency. Negative for difficulty urinating and hematuria. Musculoskeletal: Positive for back pain. Negative for gait problem. Skin: Positive for rash. Allergic/Immunologic: Negative for immunocompromised state. Neurological: Negative for syncope. Psychiatric/Behavioral: Positive for sleep disturbance. All other systems reviewed and are negative.       Objective:  Vitals:    05/28/19 1542   BP: 119/79   Pulse: 84   Resp: 17   Temp: 97.5 °F (36.4 °C)   TempSrc: Oral   SpO2: 100%   Weight: 163 lb 12.8 oz (74.3 kg)   Height: 5' 4\" (1.626 m)   PainSc:   0 - No pain       alert, well appearing, and in no distress, oriented to person, place, and time and overweight  Mental status - normal mood, behavior, speech, dress, motor activity, and thought processes  Skin - excoriations with erythematous lesions on back and arms    Assessment/Plan:    1. Steroid-induced diabetes ([address]  Goal hgb a1c <7  - metFORMIN (GLUMETZA ER) 500 mg TG24 24 hour tablet; take 1 tablet by mouth twice a day  Dispense: 60 Tab; Refill: 2    2. Essential hypertension  Goal <130/80  - bisoprolol-hydroCHLOROthiazide (ZIAC) 2.5-6.25 mg per tablet; Take 1 Tab by mouth daily. Dispense: 90 Tab; Refill: 3    3. Dermatitis  Kenalog injection; continue antihistamine      Lab review: no lab studies available for review at time of visit      I have discussed the diagnosis with the patient and the intended plan as seen in the above orders. The patient has received an after-visit summary and questions were answered concerning future plans. I have discussed medication side effects and warnings with the patient as well. I have reviewed the plan of care with the patient, accepted their input and they are in agreement with the treatment goals.

## 2019-06-10 ENCOUNTER — OFFICE VISIT (OUTPATIENT)
Dept: OBGYN CLINIC | Age: 59
End: 2019-06-10

## 2019-06-10 VITALS
OXYGEN SATURATION: 100 % | BODY MASS INDEX: 27.31 KG/M2 | WEIGHT: 160 LBS | DIASTOLIC BLOOD PRESSURE: 87 MMHG | HEIGHT: 64 IN | HEART RATE: 106 BPM | TEMPERATURE: 98 F | RESPIRATION RATE: 20 BRPM | SYSTOLIC BLOOD PRESSURE: 149 MMHG

## 2019-06-10 DIAGNOSIS — G89.29 CHRONIC FEMALE PELVIC PAIN: Primary | ICD-10-CM

## 2019-06-10 DIAGNOSIS — R10.2 CHRONIC FEMALE PELVIC PAIN: Primary | ICD-10-CM

## 2019-06-10 NOTE — PATIENT INSTRUCTIONS
Hemorrhagic Ovarian Cyst: Care Instructions  Your Care Instructions    Sometimes a sac forms on the surface of a woman's ovary. When the sac swells up with fluid, it forms a cyst. If the cyst bleeds, it is called a hemorrhagic (say \"Yaakov\") ovarian cyst. If the cyst breaks open, blood and fluid spill out into the lower belly and pelvis. You may not have symptoms from the cyst. But if it is large, or if it twists or breaks open, you may have pain or other problems. You may feel pain from the cyst or have symptoms from losing blood. Your doctor may use a pelvic ultrasound to see if you have a cyst. Blood tests can help your doctor tell if the cyst is bleeding or you have lost a lot of blood. Treatment depends on your symptoms. If they are mild, your doctor may suggest carefully watching your symptoms and doing blood tests again. But if you have a cyst that is very large, bleeds a lot, or causes other problems, your doctor may suggest surgery to remove it. If the bleeding is heavy, you may also need treatment to replace the blood. Follow-up care is a key part of your treatment and safety. Be sure to make and go to all appointments, and call your doctor if you are having problems. It's also a good idea to know your test results and keep a list of the medicines you take. How can you care for yourself at home? · Use heat, such as a warm water bottle, a heating pad set on low, or a warm bath, to relax tense muscles and relieve cramping. · Be safe with medicines. Read and follow all instructions on the label. ? If the doctor gave you a prescription medicine for pain, take it as prescribed. ? If you are not taking a prescription pain medicine, ask your doctor if you can take an over-the-counter medicine. When should you call for help? Call 911 anytime you think you may need emergency care.  For example, call if:    · You passed out (lost consciousness).    Call your doctor now or seek immediate medical care if:    · You have severe vaginal bleeding.     · You are dizzy or lightheaded, or you feel like you may faint.     · You have new or worse pain in your belly or pelvis.    Watch closely for changes in your health, and be sure to contact your doctor if:    · You think you may be pregnant.     · You do not get better as expected. Where can you learn more? Go to http://betzy-didi.info/. Enter D256 in the search box to learn more about \"Hemorrhagic Ovarian Cyst: Care Instructions. \"  Current as of: May 14, 2018  Content Version: 11.9  © 6822-3367 [employer]. Care instructions adapted under license by [employer] (which disclaims liability or warranty for this information). If you have questions about a medical condition or this instruction, always ask your healthcare professional. [address] any warranty or liability for your use of this information.

## 2019-06-10 NOTE — PROGRESS NOTES
62 y/o with chronic pelvic pain presents to the office for follow-up. She has had complicated GI surgery with bowel involvement and anastomosis. She states the pain in her lower abdomen and pelvis is excruciating. She wetn to the ED and has a stable 2 cm left ovarian cyst.  No acute pathology was noted to account for her pain. She states she has thought about this a lot and would like to have surgery. She has seen Dr. Rj Barrow- gen surgery as well. ROS: + pain in vagina, pain with thrusting of intercourse. She denies UTI symptoms, constipation, other concerns.     Active Ambulatory Problems     Diagnosis Date Noted    Generalized abdominal pain     Osteoarthritis of left knee 06/27/2016    Hypertension 06/27/2016    Hyperlipidemia 06/27/2016    GERD (gastroesophageal reflux disease) 06/27/2016    Asthma 06/27/2016    Type 2 diabetes mellitus ([address] 06/27/2016    SBO [facility]small bowel obstruction) ([facility] 02/21/2017    Chronic abdominal pain 03/14/2017    Post-operative pain 03/14/2017    Chest pain 04/20/2014    Essential hypertension, benign 02/27/2012    Sural neuritis 06/23/2014    Type 2 diabetes mellitus with nephropathy ([address] 01/19/2018    Non-intractable cyclical vomiting with nausea 01/24/2018    Bowel obstruction ([address] 08/23/2018     Resolved Ambulatory Problems     Diagnosis Date Noted    Follow-up examination following surgery 10/30/2010    Abdominal pain, chronic, right lower quadrant 08/13/2012    Pelvic abscess in female 03/02/2017     Past Medical History:   Diagnosis Date    Abdominal adhesions     Anemia     Asthma     Diabetes mellitus     Dyskinesia     Essential hypertension     GERD (gastroesophageal reflux disease)     Hypertension     Menopause     Microhematuria     Rheumatoid arteritis 2018    Stool color black      Visit Vitals  /87   Pulse (!) 106   Temp 98 °F (36.7 °C) (Oral)   Resp 20   Ht 5' 4\" (1.626 m)   Wt 160 lb (72.6 kg)   SpO2 100%   BMI 27.46 kg/m²     Gen: A&O3, NAD  Abdomen: slightly protuberant, soft, NTTP, no rebound or guarding  SVE: NEFG, noted discomfort on entry of the vagina. No lesion appreciated. Cuff appears well healed. No defect palpable  BME: no palpable adnexal pathology. Diffusely tender to palpation. A/P  Abdominal pelvic pain. Recent CT scan reviewed in detail. Noted stable left ovarian cyst.  Will evaluate with TV-U/S  Wet prep: negative for vaginal infection. Pt adamant she thinks surgical intervention will remedy her pain. Long discussion about the nature of cysts and usual pain associated with it. Will be going to see Dr. Marii Cortes for possible GI cause of the pain. Will need joint case. The plan of care has been discussed with the patient. She has been given the opportunity to ask questions, which seem to have been answered satisfactorily.

## 2019-06-14 ENCOUNTER — HOSPITAL ENCOUNTER (OUTPATIENT)
Dept: ULTRASOUND IMAGING | Age: 59
Discharge: HOME | End: 2019-06-14
Attending: OBSTETRICS & GYNECOLOGY
Payer: COMMERCIAL

## 2019-06-14 DIAGNOSIS — G89.29 CHRONIC FEMALE PELVIC PAIN: ICD-10-CM

## 2019-06-14 DIAGNOSIS — R10.2 CHRONIC FEMALE PELVIC PAIN: ICD-10-CM

## 2019-06-14 PROCEDURE — 76830 TRANSVAGINAL US NON-OB: CPT

## 2019-06-26 ENCOUNTER — HOSPITAL ENCOUNTER (EMERGENCY)
Age: 59
Discharge: HOME | End: 2019-06-26
Attending: EMERGENCY MEDICINE | Admitting: EMERGENCY MEDICINE
Payer: COMMERCIAL

## 2019-06-26 ENCOUNTER — APPOINTMENT (OUTPATIENT)
Dept: CT IMAGING | Age: 59
End: 2019-06-26
Attending: EMERGENCY MEDICINE
Payer: COMMERCIAL

## 2019-06-26 ENCOUNTER — OFFICE VISIT (OUTPATIENT)
Dept: SURGERY | Age: 59
End: 2019-06-26

## 2019-06-26 VITALS
DIASTOLIC BLOOD PRESSURE: 97 MMHG | RESPIRATION RATE: 20 BRPM | SYSTOLIC BLOOD PRESSURE: 128 MMHG | TEMPERATURE: 97.5 F | HEIGHT: 64 IN | BODY MASS INDEX: 27.14 KG/M2 | HEART RATE: 89 BPM | WEIGHT: 159 LBS

## 2019-06-26 VITALS
OXYGEN SATURATION: 98 % | RESPIRATION RATE: 18 BRPM | TEMPERATURE: 97.7 F | SYSTOLIC BLOOD PRESSURE: 137 MMHG | HEART RATE: 88 BPM | WEIGHT: 159 LBS | HEIGHT: 64 IN | DIASTOLIC BLOOD PRESSURE: 87 MMHG | BODY MASS INDEX: 27.14 KG/M2

## 2019-06-26 DIAGNOSIS — R10.2 PELVIC PAIN: ICD-10-CM

## 2019-06-26 DIAGNOSIS — R10.2 VAGINAL PAIN: Primary | ICD-10-CM

## 2019-06-26 DIAGNOSIS — R10.30 LOWER ABDOMINAL PAIN: ICD-10-CM

## 2019-06-26 DIAGNOSIS — G89.29 CHRONIC ABDOMINAL PAIN: Primary | ICD-10-CM

## 2019-06-26 DIAGNOSIS — R10.9 CHRONIC ABDOMINAL PAIN: Primary | ICD-10-CM

## 2019-06-26 LAB
ALBUMIN SERPL-MCNC: 3.1 G/DL (ref 3.4–5)
ALBUMIN/GLOB SERPL: 0.9 {RATIO} (ref 0.8–1.7)
ALP SERPL-CCNC: 134 U/L (ref 45–117)
ALT SERPL-CCNC: 15 U/L (ref 13–56)
ANION GAP SERPL CALC-SCNC: 9 MMOL/L (ref 3–18)
APPEARANCE UR: CLEAR
AST SERPL-CCNC: 9 U/L (ref 15–37)
BASOPHILS # BLD: 0 K/UL (ref 0–0.1)
BASOPHILS NFR BLD: 0 % (ref 0–2)
BILIRUB SERPL-MCNC: 0.3 MG/DL (ref 0.2–1)
BILIRUB UR QL: NEGATIVE
BUN SERPL-MCNC: 17 MG/DL (ref 7–18)
BUN/CREAT SERPL: 14 (ref 12–20)
CALCIUM SERPL-MCNC: 8.7 MG/DL (ref 8.5–10.1)
CHLORIDE SERPL-SCNC: 105 MMOL/L (ref 100–108)
CO2 SERPL-SCNC: 25 MMOL/L (ref 21–32)
COLOR UR: YELLOW
CREAT SERPL-MCNC: 1.25 MG/DL (ref 0.6–1.3)
DIFFERENTIAL METHOD BLD: ABNORMAL
EOSINOPHIL # BLD: 0.1 K/UL (ref 0–0.4)
EOSINOPHIL NFR BLD: 2 % (ref 0–5)
ERYTHROCYTE [DISTWIDTH] IN BLOOD BY AUTOMATED COUNT: 15.3 % (ref 11.6–14.5)
GLOBULIN SER CALC-MCNC: 3.5 G/DL (ref 2–4)
GLUCOSE SERPL-MCNC: 306 MG/DL (ref 74–99)
GLUCOSE UR STRIP.AUTO-MCNC: >1000 MG/DL
HCT VFR BLD AUTO: 39 % (ref 35–45)
HGB BLD-MCNC: 12.2 G/DL (ref 12–16)
HGB UR QL STRIP: NEGATIVE
KETONES UR QL STRIP.AUTO: NEGATIVE MG/DL
LEUKOCYTE ESTERASE UR QL STRIP.AUTO: NEGATIVE
LIPASE SERPL-CCNC: 160 U/L (ref 73–393)
LYMPHOCYTES # BLD: 1.6 K/UL (ref 0.9–3.6)
LYMPHOCYTES NFR BLD: 24 % (ref 21–52)
MCH RBC QN AUTO: 26.5 PG (ref 24–34)
MCHC RBC AUTO-ENTMCNC: 31.3 G/DL (ref 31–37)
MCV RBC AUTO: 84.6 FL (ref 74–97)
MONOCYTES # BLD: 0.4 K/UL (ref 0.05–1.2)
MONOCYTES NFR BLD: 6 % (ref 3–10)
NEUTS SEG # BLD: 4.4 K/UL (ref 1.8–8)
NEUTS SEG NFR BLD: 68 % (ref 40–73)
NITRITE UR QL STRIP.AUTO: NEGATIVE
PH UR STRIP: 5 [PH] (ref 5–8)
PLATELET # BLD AUTO: 249 K/UL (ref 135–420)
PMV BLD AUTO: 9.7 FL (ref 9.2–11.8)
POTASSIUM SERPL-SCNC: 3.5 MMOL/L (ref 3.5–5.5)
PROT SERPL-MCNC: 6.6 G/DL (ref 6.4–8.2)
PROT UR STRIP-MCNC: NEGATIVE MG/DL
RBC # BLD AUTO: 4.61 M/UL (ref 4.2–5.3)
SODIUM SERPL-SCNC: 139 MMOL/L (ref 136–145)
SP GR UR REFRACTOMETRY: >1.03 (ref 1–1.03)
UROBILINOGEN UR QL STRIP.AUTO: 0.2 EU/DL (ref 0.2–1)
WBC # BLD AUTO: 6.5 K/UL (ref 4.6–13.2)

## 2019-06-26 PROCEDURE — 85025 COMPLETE CBC W/AUTO DIFF WBC: CPT

## 2019-06-26 PROCEDURE — 81003 URINALYSIS AUTO W/O SCOPE: CPT

## 2019-06-26 PROCEDURE — 96374 THER/PROPH/DIAG INJ IV PUSH: CPT

## 2019-06-26 PROCEDURE — 96375 TX/PRO/DX INJ NEW DRUG ADDON: CPT

## 2019-06-26 PROCEDURE — 96361 HYDRATE IV INFUSION ADD-ON: CPT

## 2019-06-26 PROCEDURE — 74011636320 HC RX REV CODE- 636/320: Performed by: EMERGENCY MEDICINE

## 2019-06-26 PROCEDURE — 99284 EMERGENCY DEPT VISIT MOD MDM: CPT

## 2019-06-26 PROCEDURE — 74177 CT ABD & PELVIS W/CONTRAST: CPT

## 2019-06-26 PROCEDURE — 96376 TX/PRO/DX INJ SAME DRUG ADON: CPT

## 2019-06-26 PROCEDURE — 83690 ASSAY OF LIPASE: CPT

## 2019-06-26 PROCEDURE — 74011250636 HC RX REV CODE- 250/636: Performed by: EMERGENCY MEDICINE

## 2019-06-26 PROCEDURE — 80053 COMPREHEN METABOLIC PANEL: CPT

## 2019-06-26 RX ORDER — ONDANSETRON 2 MG/ML
4 INJECTION INTRAMUSCULAR; INTRAVENOUS
Status: COMPLETED | OUTPATIENT
Start: 2019-06-26 | End: 2019-06-26

## 2019-06-26 RX ORDER — AMLODIPINE BESYLATE 5 MG/1
5 TABLET ORAL DAILY
COMMUNITY
End: 2019-07-30

## 2019-06-26 RX ORDER — BISMUTH SUBSALICYLATE 262 MG
1 TABLET,CHEWABLE ORAL DAILY
COMMUNITY

## 2019-06-26 RX ORDER — ONDANSETRON 4 MG/1
4 TABLET, FILM COATED ORAL
Qty: 12 TAB | Refills: 0 | Status: SHIPPED | OUTPATIENT
Start: 2019-06-26 | End: 2019-11-20

## 2019-06-26 RX ORDER — MORPHINE SULFATE 4 MG/ML
4 INJECTION INTRAVENOUS
Status: COMPLETED | OUTPATIENT
Start: 2019-06-26 | End: 2019-06-26

## 2019-06-26 RX ADMIN — SODIUM CHLORIDE 1000 ML: 900 INJECTION, SOLUTION INTRAVENOUS at 14:34

## 2019-06-26 RX ADMIN — MORPHINE SULFATE 4 MG: 4 INJECTION INTRAVENOUS at 14:35

## 2019-06-26 RX ADMIN — ONDANSETRON 4 MG: 2 INJECTION INTRAMUSCULAR; INTRAVENOUS at 14:35

## 2019-06-26 RX ADMIN — IOPAMIDOL 90 ML: 612 INJECTION, SOLUTION INTRAVENOUS at 15:09

## 2019-06-26 RX ADMIN — ONDANSETRON 4 MG: 2 INJECTION INTRAMUSCULAR; INTRAVENOUS at 15:53

## 2019-06-26 NOTE — PROGRESS NOTES
General Surgery Consult    Blanca Nichols  Admit date: (Not on file)    MRN: [id number]     : 1960     Age: 61 y.o. Attending Physician: Kari Dash MD Formerly [facility]      History of Present Illness:      Blanca Nichols is a 61 y.o. female who very well-known to me. She had multiple laparotomies in the past and I met with her couple years ago when I tried to perform a laparoscopic lysis of adhesion. At that point I aborted the surgery because of the severity of the adhesions. However the patient continued to have abdominal pain and a picture of bowel obstruction for which she ended up with a major laparotomy and bowel resection. The patient has been complaining of vaginal pain as well as pelvic pain and she went to the emergency room multiple times. She has seen by Dr. Angel Navarro, her GYN, and she was diagnosed with ovarian cyst though she had bilateral oophorectomy in the past. From the note it seems that Dr. Angel Navarro does not believe that the small cyst are the reason for her pain however the patient insisted in proceeding with the surgery. So she was referred to me for evaluation and for assistant in the procedure giving her long history of adhesions and scarring. The patient denies any nausea or vomiting or any change in bowel habits.     Patient Active Problem List    Diagnosis Date Noted    Bowel obstruction ([address] 2018    Non-intractable cyclical vomiting with nausea 2018    Type 2 diabetes mellitus with nephropathy ([address] 2018    Chronic abdominal pain 2017    Post-operative pain 2017    SBO (small bowel obstruction) ([address] 2017    Osteoarthritis of left knee 2016    Hypertension 2016    Hyperlipidemia 2016    GERD (gastroesophageal reflux disease) 2016    Asthma 2016    Type 2 diabetes mellitus ([address] 2016    Sural neuritis 2014    Chest pain 2014    Essential hypertension, benign 2012    Generalized abdominal pain      Past Medical History:   Diagnosis Date    Abdominal adhesions     Anemia     Asthma     Diabetes mellitus     Dyskinesia     bilateral    Essential hypertension     GERD (gastroesophageal reflux disease)     Hypertension     Menopause     Microhematuria     Rheumatoid arteritis 2018    Stool color black       Past Surgical History:   Procedure Laterality Date    COLONOSCOPY N/A 1/21/2019    COL[facility]COPY performed by Shell [facility] at [facility] ENDOSCOPY    HX CHOLECYSTECTOMY  6/28/10    HX COLONOSCOPY      HX ENDOSCOPY      HX HERNIA REPAIR      HX HYSTERECTOMY      Fibroids    HX KNEE REPLACEMENT Left     HX KNEE REPLACEMENT Right 06/2018    HX OOPHORECTOMY  12/2000    HX ORTHOPAEDIC Right     ankle- multiple surgeries    HX SMALL BOWEL RESECTION  12/2000    HX SMALL BOWEL RESECTION  02/21/2017    Dr. Kei Mosher        Social History     Tobacco Use    Smoking status: Never Smoker    Smokeless tobacco: Never Used   Substance Use Topics    Alcohol use: No      Social History     Tobacco Use   Smoking Status Never Smoker   Smokeless Tobacco Never Used     Family History   Problem Relation Age of Onset    Hypertension Other         parent    Breast Cancer Other 21    Heart Disease Father     Hypertension Father     Diabetes Father     Diabetes Mother     Hypertension Other         sibling    Diabetes Other         parent    Diabetes Sister     Kidney Disease Brother     Diabetes Brother       Current Outpatient Medications   Medication Sig    amLODIPine (NORVASC) 5 mg tablet Take 5 mg by mouth daily.  multivitamin (ONE A DAY) tablet Take 1 Tab by mouth daily.  cyclobenzaprine (FLEXERIL) 10 mg tablet Take 1 Tab by mouth three (3) times daily as needed for Muscle Spasm(s).     ALLERGY RELIEF, CETIRIZINE, 10 mg tablet take 1 tablet by mouth once daily    metFORMIN (GLUMETZA ER) 500 mg TG24 24 hour tablet take 1 tablet by mouth twice a day    bisoprolol-hydroCHLOROthiazide (ZIAC) 2.5-6.25 mg per tablet Take 1 Tab by mouth daily.  ondansetron (ZOFRAN ODT) 4 mg disintegrating tablet Take 1-2 tablets every 6-8 hours as needed for nausea and vomiting.  calamine-zinc oxide (CALAMINE) solution aaa tid prn itching    doxepin (SINEQUAN) 10 mg capsule Take 1 Cap by mouth nightly.  omeprazole (PRILOSEC) 10 mg capsule take 1 capsule by mouth once daily    FARXIGA 10 mg tab tablet take 1 tablet by mouth once daily    promethazine-codeine (PHENERGAN WITH CODEINE) 6.25-10 mg/5 mL syrup Take 5 mL by mouth four (4) times daily as needed for Cough. Max Daily Amount: 20 mL.  amLODIPine (NORVASC) 2.5 mg tablet take 1 tablet by mouth once daily NIGHTLY    folic acid (FOLVITE) 1 mg tablet Take  by mouth daily.  hydroxychloroquine (PLAQUENIL) 200 mg tablet Take 200 mg by mouth daily.  methotrexate (RHEUMATREX) 2.5 mg tablet Take 5 mg by mouth every Tuesday.  sulfaSALAzine (AZULFIDINE) 500 mg tablet Take 500 mg by mouth two (2) times a day.  glucose blood VI test strips (ONETOUCH ULTRA TEST) strip Check blood glucose as directed    ondansetron hcl (ZOFRAN) 4 mg tablet Take 2 Tabs by mouth every eight (8) hours as needed for Nausea.  HYDROcodone-acetaminophen (NORCO) 5-325 mg per tablet take 1 tablet by mouth every 4 hours if needed    albuterol (PROVENTIL HFA, VENTOLIN HFA, PROAIR HFA) 90 mcg/actuation inhaler Take 1 Puff by inhalation every six (6) hours as needed for Wheezing. No current facility-administered medications for this visit.        Allergies   Allergen Reactions    Macrodantin [Nitrofurantoin Macrocrystalline] Itching and Other (comments)    Tape [Adhesive] Other (comments)     Paper tape-- feels like burning skin  Burned skin when had wound vac        Review of Systems:  Constitutional: negative  Eyes: negative  Ears, Nose, Mouth, Throat, and Face: negative  Respiratory: negative  Cardiovascular: negative  Gastrointestinal: positive for abdominal pain  Genitourinary:positive for Vaginal and pelvic pain. Integument/Breast: negative  Hematologic/Lymphatic: negative  Musculoskeletal:negative  Neurological: negative  Behavioral/Psychiatric: negative  Endocrine: negative  Allergic/Immunologic: negative    Objective:     Visit Vitals  BP (!) 128/97 (BP 1 Location: Right arm, BP Patient Position: At rest)   Pulse 89   Temp 97.5 °F (36.4 °C) (Oral)   Resp 20   Ht 5' 4\" (1.626 m)   Wt 72.1 kg (159 lb)   BMI 27.29 kg/m²       Physical Exam:      General:  in no apparent distress, alert and oriented times 3   Eyes:  conjunctivae and sclerae normal, pupils equal, round, reactive to light   Throat & Neck: no erythema or exudates noted and neck supple and symmetrical; no palpable masses   Lungs:   clear to auscultation bilaterally   Heart:  Regular rate and rhythm   Abdomen:   rounded, soft, nontender, nondistended, no masses or organomegaly. No evidence of abdominal wall hernias.     Extremities: extremities normal, atraumatic, no cyanosis or edema   Skin: Normal.       Imaging and Lab Review:     CBC:   Lab Results   Component Value Date/Time    WBC 5.2 05/27/2019 01:15 PM    RBC 5.13 05/27/2019 01:15 PM    HGB 14.0 05/27/2019 01:15 PM    HCT 44.3 05/27/2019 01:15 PM    PLATELET 112 80/65/4132 01:15 PM     BMP:   Lab Results   Component Value Date/Time    Glucose 243 (H) 05/27/2019 01:15 PM    Sodium 138 05/27/2019 01:15 PM    Potassium 4.0 05/27/2019 01:15 PM    Chloride 100 05/27/2019 01:15 PM    CO2 27 05/27/2019 01:15 PM    BUN 18 05/27/2019 01:15 PM    Creatinine 1.39 (H) 05/27/2019 01:15 PM    Calcium 9.1 05/27/2019 01:15 PM     CMP:  Lab Results   Component Value Date/Time    Glucose 243 (H) 05/27/2019 01:15 PM    Sodium 138 05/27/2019 01:15 PM    Potassium 4.0 05/27/2019 01:15 PM    Chloride 100 05/27/2019 01:15 PM    CO2 27 05/27/2019 01:15 PM    BUN 18 05/27/2019 01:15 PM    Creatinine 1.39 (H) 05/27/2019 01:15 PM    Calcium 9.1 05/27/2019 01:15 PM    Anion gap 11 05/27/2019 01:15 PM    BUN/Creatinine ratio 13 05/27/2019 01:15 PM    Alk. phosphatase 149 (H) 05/27/2019 01:15 PM    Protein, total 7.6 05/27/2019 01:15 PM    Albumin 3.4 05/27/2019 01:15 PM    Globulin 4.2 (H) 05/27/2019 01:15 PM    A-G Ratio 0.8 05/27/2019 01:15 PM       No results found for this or any previous visit (from the past 24 hour(s)). images and reports reviewed    Assessment:   Emil Andrade is a 61 y.o. female is presenting with vaginal and pelvic pain and some lower abdominal pain that seems to be attributed to finding of cystic adnexal structures and/or adhesions from her multiple previous laparotomies. I had a long discussion with the patient advising her that she should not proceed with the surgery or any laparotomy if possible. I explained to her that I remember her adhesions and they were significant and severe. And I explained to her the concern of coming back in which can lead to further adhesions and possible bowel injury. I also explained to her that this could lead to a fistula and even not only high morbidity but mortality from which the patient can die . The patient insisted that she wants to proceed with the surgery. She stated that she is not sleeping at night as well as her  because of the severe pain she is having and she believes that the cyst or the reason for her pain. She also stated that she believes the scars from the previous surgeries of the reason for some of the pain as well . Because the patient is insisting in proceeding with the surgery I asked her to wait in the room and I told her that I will talk to Dr. Chick Kanner in person. I called her office and they [place] and she is coming back on Monday.  So I went back to the room and explained to the patient that we will wait till Dr. Chick Kanner comes back so I can discuss the case with her and make sure she is planning in proceeding with the surgery. Plan:     Advised the patient against the surgery for her adnexal cyst removal however she is insisting in proceeding with the surgery.   Will wait till Dr. Gaetano Glez comes back and discuss the case with her before deciding on any plan    Please call me if you have any questions (cell phone: [phone])     Signed By: Baron Nathan MD     June 26, 2019

## 2019-06-26 NOTE — ED PROVIDER NOTES
EMERGENCY DEPARTMENT HISTORY AND PHYSICAL EXAM    2:28 PM      Date: 6/26/2019  Patient Name: Ev Rowley    History of Presenting Illness     Chief Complaint   Patient presents with    Abdominal Pain    Flank Pain         HISTORY: Ev Rowley is a 61 y.o. female with chronic abdominal pain and small bowel obstruction who presents with lower abdominal pain which radiates to the right flank. Patient reports a chronic history of abdominal and pelvic pain for which she is seeing Dr. Nick Steen and Dr. abreu. She does have a left adnexal cyst and significant adhesions. She was seen today by Dr. Nick Steen for evaluation of surgery for removal of the cyst which would be done by Dr. Barbi Sahu and lysis of adhesions would be done Dr. Nick Steen. She reports after the office visit her pain worsened and she vomited. Her pain is sharp. Nothing makes it better or worse. She usually takes Motrin for it at home. This feels similar to her previous episodes of the pain. She denies vaginal bleeding or discharge, dysuria or hematuria, fevers, chest pain, difficulty breathing. PCP: Paulina Sherman MD    Current Facility-Administered Medications   Medication Dose Route Frequency Provider Last Rate Last Dose    sodium chloride 0.9 % bolus infusion 1,000 mL  1,000 mL IntraVENous ONCE Figueroa Kendall MD        morphine injection 4 mg  4 mg IntraVENous NOW Figueroa Kendall MD        ondansetron [facility]) injection 4 mg  4 mg IntraVENous NOW Figueroa Kendall MD         Current Outpatient Medications   Medication Sig Dispense Refill    amLODIPine (NORVASC) 5 mg tablet Take 5 mg by mouth daily.  multivitamin (ONE A DAY) tablet Take 1 Tab by mouth daily.  cyclobenzaprine (FLEXERIL) 10 mg tablet Take 1 Tab by mouth three (3) times daily as needed for Muscle Spasm(s).  20 Tab 0    ALLERGY RELIEF, CETIRIZINE, 10 mg tablet take 1 tablet by mouth once daily 90 Tab 3    metFORMIN (GLUMETZA ER) 500 mg TG24 24 hour tablet take 1 tablet by mouth twice a day 60 Tab 2    bisoprolol-hydroCHLOROthiazide (ZIAC) 2.5-6.25 mg per tablet Take 1 Tab by mouth daily. 90 Tab 3    ondansetron hcl (ZOFRAN) 4 mg tablet Take 2 Tabs by mouth every eight (8) hours as needed for Nausea. 12 Tab 0    ondansetron (ZOFRAN ODT) 4 mg disintegrating tablet Take 1-2 tablets every 6-8 hours as needed for nausea and vomiting. 10 Tab 0    calamine-zinc oxide (CALAMINE) solution aaa tid prn itching 177 mL 0    doxepin (SINEQUAN) 10 mg capsule Take 1 Cap by mouth nightly. 30 Cap 11    omeprazole (PRILOSEC) 10 mg capsule take 1 capsule by mouth once daily 30 Cap 11    HYDROcodone-acetaminophen (NORCO) 5-325 mg per tablet take 1 tablet by mouth every 4 hours if needed 12 Tab 0    FARXIGA 10 mg tab tablet take 1 tablet by mouth once daily 90 Tab 3    promethazine-codeine (PHENERGAN WITH CODEINE) 6.25-10 mg/5 mL syrup Take 5 mL by mouth four (4) times daily as needed for Cough. Max Daily Amount: 20 mL. 118 mL 0    amLODIPine (NORVASC) 2.5 mg tablet take 1 tablet by mouth once daily NIGHTLY 90 Tab 1    albuterol (PROVENTIL HFA, VENTOLIN HFA, PROAIR HFA) 90 mcg/actuation inhaler Take 1 Puff by inhalation every six (6) hours as needed for Wheezing. 1 Inhaler 0    folic acid (FOLVITE) 1 mg tablet Take  by mouth daily.  hydroxychloroquine (PLAQUENIL) 200 mg tablet Take 200 mg by mouth daily.  methotrexate (RHEUMATREX) 2.5 mg tablet Take 5 mg by mouth every Tuesday.  sulfaSALAzine (AZULFIDINE) 500 mg tablet Take 500 mg by mouth two (2) times a day.       glucose blood VI test strips (ONETOUCH ULTRA TEST) strip Check blood glucose as directed 100 Strip 1       Past History     Past Medical History:  Past Medical History:   Diagnosis Date    Abdominal adhesions     Anemia     Asthma     Diabetes mellitus     Dyskinesia     bilateral    Essential hypertension     GERD (gastroesophageal reflux disease)     Hypertension     Menopause     Microhematuria     Rheumatoid arteritis 2018    Stool color black        Past Surgical History:  Past Surgical History:   Procedure Laterality Date    COLONOSCOPY N/A 1/21/2019    [facility] by Ck Garvin [facility] at [facility] ENDOSCOPY    HX CHOLECYSTECTOMY  6/28/10    HX COLONOSCOPY      HX ENDOSCOPY      HX HERNIA REPAIR      HX HYSTERECTOMY      Fibroids    HX KNEE REPLACEMENT Left     HX KNEE REPLACEMENT Right 06/2018    HX OOPHORECTOMY  12/2000    HX ORTHOPAEDIC Right     ankle- multiple surgeries    HX SMALL BOWEL RESECTION  12/2000    HX SMALL BOWEL RESECTION  02/21/2017    Dr. José Duongwall         Family History:  Family History   Problem Relation Age of Onset    Hypertension Other         parent    Breast Cancer Other 21    Heart Disease Father     Hypertension Father     Diabetes Father     Diabetes Mother     Hypertension Other         sibling    Diabetes Other         parent    Diabetes Sister     Kidney Disease Brother     Diabetes Brother        Social History:  Social History     Tobacco Use    Smoking status: Never Smoker    Smokeless tobacco: Never Used   Substance Use Topics    Alcohol use: No    Drug use: No       Allergies: Allergies   Allergen Reactions    Macrodantin [Nitrofurantoin Macrocrystalline] Itching and Other (comments)    Tape [Adhesive] Other (comments)     Paper tape-- feels like burning skin  Burned skin when had wound vac         Review of Systems       Review of Systems   Constitutional: Negative for chills. HENT: Negative for congestion and sore throat. Respiratory: Negative for cough and shortness of breath. Cardiovascular: Negative for chest pain. Gastrointestinal: Positive for abdominal pain and nausea. Negative for diarrhea and vomiting. Genitourinary: Positive for pelvic pain. Negative for dysuria. Musculoskeletal: Negative for back pain. Skin: Negative for rash.    Neurological: Negative for dizziness and headaches. Physical Exam     Visit Vitals  BP (!) 153/91 (BP 1 Location: Right arm, BP Patient Position: At rest)   Pulse (!) 102   Temp 97.7 °F (36.5 °C)   Resp 18   Ht 5' 4\" (1.626 m)   Wt 72.1 kg (159 lb)   SpO2 99%   BMI 27.29 kg/m²       Physical Exam  General Exam: Patient is a well developed and well nourished in no distress. Patient does not appear acutely ill or toxic. Eye Exam: Lids and conjunctiva are normal  ENT Exam: The general head and facial exam is normal.  The neck is supple without meningeal signs. No significant adenopathy. Pulmonary Exam: No respiratory distress. The respiratory rate is normal.  No stridor. The breath sounds are equal bilaterally. There are no wheezes, rales, or rhonchi noted. Cardiac Exam: The cardiac rate is tachycardic and rhythm are normal.  No significant murmurs, rubs, or gallops. The peripheral pulses of the upper extremities are normal.  Abdominal Exam: Abdomen is soft and non-distended. No pulsatile masses. Mild suprapubic and lower right-sided tenderness. There is no rebound or guarding noted. Skin and Soft Tissue: The skin is warm and dry, without significant abnormality. Good color. Musculoskeletal Exam: No peripheral edema. The musculoskeletal exam of the lower extremities is normal without significant local tenderness. Psychiatric: Normal adult with appropriate demeanor and interpersonal interaction. Is oriented to person, place, and time. Diagnostic Study Results     Labs -  No results found for this or any previous visit (from the past 12 hour(s)). Radiologic Studies -   CT ABD PELV W CONT    (Results Pending)         Medical Decision Making   I am the first provider for this patient. I reviewed the vital signs, available nursing notes, past medical history, past surgical history, family history and social history. Vital Signs-Reviewed the patient's vital signs.     Records Reviewed: Nursing Notes (Time of Review: 2:28 PM)    ED Course: Progress Notes, Reevaluation, and Consults:      Provider Notes (Medical Decision Making): Patient with lower abdominal pain that is been chronic in nature and has been evaluated by general surgery and gynecology. She was just at her surgeon's office. Reports having worsening pain after exam today. Has mild lower abdominal tenderness. This is similar to her previous episodes of pain. However patient does have a history of small bowel obstructions. Labs reviewed and without significant abnormality. CT is unremarkable for acute process. Patient has no symptoms to suggest a urinary tract infection. She is resting comfortably. She is comfortable with plan for continued follow-up with her surgeons. Diagnosis     Clinical Impression:   1. Chronic abdominal pain        Disposition: DC    Follow-up Information    None          Patient's Medications   Start Taking    No medications on file   Continue Taking    ALBUTEROL (PROVENTIL HFA, VENTOLIN HFA, PROAIR HFA) 90 MCG/ACTUATION INHALER    Take 1 Puff by inhalation every six (6) hours as needed for Wheezing. ALLERGY RELIEF, CETIRIZINE, 10 MG TABLET    take 1 tablet by mouth once daily    AMLODIPINE (NORVASC) 2.5 MG TABLET    take 1 tablet by mouth once daily NIGHTLY    AMLODIPINE (NORVASC) 5 MG TABLET    Take 5 mg by mouth daily. BISOPROLOL-HYDROCHLOROTHIAZIDE (ZIAC) 2.5-6.25 MG PER TABLET    Take 1 Tab by mouth daily. CALAMINE-ZINC OXIDE (CALAMINE) SOLUTION    aaa tid prn itching    CYCLOBENZAPRINE (FLEXERIL) 10 MG TABLET    Take 1 Tab by mouth three (3) times daily as needed for Muscle Spasm(s). DOXEPIN (SINEQUAN) 10 MG CAPSULE    Take 1 Cap by mouth nightly. FARXIGA 10 MG TAB TABLET    take 1 tablet by mouth once daily    FOLIC ACID (FOLVITE) 1 MG TABLET    Take  by mouth daily.     GLUCOSE BLOOD VI TEST STRIPS (ONETOUCH ULTRA TEST) STRIP    Check blood glucose as directed    HYDROCODONE-ACETAMINOPHEN (NORCO) 5-325 MG PER TABLET    take 1 tablet by mouth every 4 hours if needed    HYDROXYCHLOROQUINE (PLAQUENIL) 200 MG TABLET    Take 200 mg by mouth daily. METFORMIN (GLUMETZA ER) 500 MG TG24 24 HOUR TABLET    take 1 tablet by mouth twice a day    METHOTREXATE (RHEUMATREX) 2.5 MG TABLET    Take 5 mg by mouth every Tuesday. MULTIVITAMIN (ONE A DAY) TABLET    Take 1 Tab by mouth daily. OMEPRAZOLE (PRILOSEC) 10 MG CAPSULE    take 1 capsule by mouth once daily    ONDANSETRON (ZOFRAN ODT) 4 MG DISINTEGRATING TABLET    Take 1-2 tablets every 6-8 hours as needed for nausea and vomiting. ONDANSETRON HCL (ZOFRAN) 4 MG TABLET    Take 2 Tabs by mouth every eight (8) hours as needed for Nausea. PROMETHAZINE-CODEINE (PHENERGAN WITH CODEINE) 6.25-10 MG/5 ML SYRUP    Take 5 mL by mouth four (4) times daily as needed for Cough. Max Daily Amount: 20 mL. SULFASALAZINE (AZULFIDINE) 500 MG TABLET    Take 500 mg by mouth two (2) times a day. These Medications have changed    No medications on file   Stop Taking    No medications on file     _______________________________    Please note that this dictation was completed with [email or web address], the computer voice recognition software. Quite often unanticipated grammatical, syntax, homophones, and other interpretive errors are inadvertently transcribed by the computer software. Please disregard these errors. Please excuse any errors that have escaped final proofreading.

## 2019-06-26 NOTE — ED TRIAGE NOTES
Pt presents w/ lower abd pain and R flank pain since last night (+) N/V. H/o bowel obstruction.  Denies urinary sx

## 2019-06-26 NOTE — DISCHARGE INSTRUCTIONS
Patient Education      Please follow-up with Dr. Lisa Morris and Dr. Gautam Mosley regarding your abdominal pain. Please also discuss this with your primary care doctor and see if they feel a referral to pain management would be helpful for you. Abdominal Pain: Care Instructions  Your Care Instructions    Abdominal pain has many possible causes. Some aren't serious and get better on their own in a few days. Others need more testing and treatment. If your pain continues or gets worse, you need to be rechecked and may need more tests to find out what is wrong. You may need surgery to correct the problem. Don't ignore new symptoms, such as fever, nausea and vomiting, urination problems, pain that gets worse, and dizziness. These may be signs of a more serious problem. Your doctor may have recommended a follow-up visit in the next 8 to 12 hours. If you are not getting better, you may need more tests or treatment. The doctor has checked you carefully, but problems can develop later. If you notice any problems or new symptoms, get medical treatment right away. Follow-up care is a key part of your treatment and safety. Be sure to make and go to all appointments, and call your doctor if you are having problems. It's also a good idea to know your test results and keep a list of the medicines you take. How can you care for yourself at home? · Rest until you feel better. · To prevent dehydration, drink plenty of fluids, enough so that your urine is light yellow or clear like water. Choose water and other caffeine-free clear liquids until you feel better. If you have kidney, heart, or liver disease and have to limit fluids, talk with your doctor before you increase the amount of fluids you drink. · If your stomach is upset, eat mild foods, such as rice, dry toast or crackers, bananas, and applesauce. Try eating several small meals instead of two or three large ones.   · Wait until 48 hours after all symptoms have gone away before you have spicy foods, alcohol, and drinks that contain caffeine. · Do not eat foods that are high in fat. · Avoid anti-inflammatory medicines such as aspirin, ibuprofen (Advil, Motrin), and naproxen (Aleve). These can cause stomach upset. Talk to your doctor if you take daily aspirin for another health problem. When should you call for help? Call 911 anytime you think you may need emergency care. For example, call if:    · You passed out (lost consciousness).     · You pass maroon or very bloody stools.     · You vomit blood or what looks like coffee grounds.     · You have new, severe belly pain.    Call your doctor now or seek immediate medical care if:    · Your pain gets worse, especially if it becomes focused in one area of your belly.     · You have a new or higher fever.     · Your stools are black and look like tar, or they have streaks of blood.     · You have unexpected vaginal bleeding.     · You have symptoms of a urinary tract infection. These may include:  ? Pain when you urinate. ? Urinating more often than usual.  ? Blood in your urine.     · You are dizzy or lightheaded, or you feel like you may faint.    Watch closely for changes in your health, and be sure to contact your doctor if:    · You are not getting better after 1 day (24 hours). Where can you learn more? Go to http://betzy-didi.info/. Enter H691 in the search box to learn more about \"Abdominal Pain: Care Instructions. \"  Current as of: September 23, 2018  Content Version: 11.9  © 9019-7940 [employer]. Care instructions adapted under license by [employer] (which disclaims liability or warranty for this information). If you have questions about a medical condition or this instruction, always ask your healthcare professional. [address] any warranty or liability for your use of this information.

## 2019-06-26 NOTE — PROGRESS NOTES
Gómez Ludwig is a 61 y.o. female who presents today with   Chief Complaint   Patient presents with    Surgical Follow-up     Pt presents today for evaluation of abdominal pain. Hx EX Lap with small bowel resection 2/21/2017. Pt says she was told she has 2 ovarian cyst and Dr. Mykel Valadez would be needed to assist in the surgery with Dr Jazmine Mix. 1. Have you been to the ER, urgent care clinic since your last visit? Hospitalized since your last visit? No    2. Have you seen or consulted any other health care providers outside of the [address] [place] [address] since your last visit? Include any pap smears or colon screening.  No

## 2019-07-08 ENCOUNTER — HOSPITAL ENCOUNTER (OUTPATIENT)
Dept: MRI IMAGING | Age: 59
Discharge: HOME | End: 2019-07-08
Attending: ORTHOPAEDIC SURGERY
Payer: COMMERCIAL

## 2019-07-08 DIAGNOSIS — M06.9 ARTHRITIS, RHEUMATOID (HCC): ICD-10-CM

## 2019-07-08 PROCEDURE — 73221 MRI JOINT UPR EXTREM W/O DYE: CPT

## 2019-07-10 ENCOUNTER — APPOINTMENT (OUTPATIENT)
Dept: CT IMAGING | Age: 59
End: 2019-07-10
Attending: PHYSICIAN ASSISTANT
Payer: COMMERCIAL

## 2019-07-10 ENCOUNTER — HOSPITAL ENCOUNTER (EMERGENCY)
Age: 59
Discharge: HOME | End: 2019-07-10
Attending: EMERGENCY MEDICINE
Payer: COMMERCIAL

## 2019-07-10 VITALS
DIASTOLIC BLOOD PRESSURE: 86 MMHG | TEMPERATURE: 97.6 F | SYSTOLIC BLOOD PRESSURE: 120 MMHG | RESPIRATION RATE: 16 BRPM | HEIGHT: 64 IN | BODY MASS INDEX: 27.31 KG/M2 | HEART RATE: 84 BPM | OXYGEN SATURATION: 100 % | WEIGHT: 160 LBS

## 2019-07-10 DIAGNOSIS — R73.9 HYPERGLYCEMIA: ICD-10-CM

## 2019-07-10 DIAGNOSIS — K59.00 CONSTIPATION, UNSPECIFIED CONSTIPATION TYPE: Primary | ICD-10-CM

## 2019-07-10 LAB
ALBUMIN SERPL-MCNC: 3.7 G/DL (ref 3.4–5)
ALBUMIN/GLOB SERPL: 0.9 {RATIO} (ref 0.8–1.7)
ALP SERPL-CCNC: 159 U/L (ref 45–117)
ALT SERPL-CCNC: 32 U/L (ref 13–56)
ANION GAP SERPL CALC-SCNC: 6 MMOL/L (ref 3–18)
APPEARANCE UR: CLEAR
AST SERPL-CCNC: 24 U/L (ref 15–37)
BASOPHILS # BLD: 0 K/UL (ref 0–0.1)
BASOPHILS NFR BLD: 0 % (ref 0–2)
BILIRUB SERPL-MCNC: 0.4 MG/DL (ref 0.2–1)
BILIRUB UR QL: NEGATIVE
BUN SERPL-MCNC: 13 MG/DL (ref 7–18)
BUN/CREAT SERPL: 11 (ref 12–20)
CALCIUM SERPL-MCNC: 9.3 MG/DL (ref 8.5–10.1)
CHLORIDE SERPL-SCNC: 100 MMOL/L (ref 100–108)
CO2 SERPL-SCNC: 28 MMOL/L (ref 21–32)
COLOR UR: YELLOW
CREAT SERPL-MCNC: 1.21 MG/DL (ref 0.6–1.3)
DIFFERENTIAL METHOD BLD: ABNORMAL
EOSINOPHIL # BLD: 0.1 K/UL (ref 0–0.4)
EOSINOPHIL NFR BLD: 2 % (ref 0–5)
ERYTHROCYTE [DISTWIDTH] IN BLOOD BY AUTOMATED COUNT: 16 % (ref 11.6–14.5)
GLOBULIN SER CALC-MCNC: 4.1 G/DL (ref 2–4)
GLUCOSE SERPL-MCNC: 153 MG/DL (ref 74–99)
GLUCOSE UR STRIP.AUTO-MCNC: >1000 MG/DL
HCT VFR BLD AUTO: 44.9 % (ref 35–45)
HGB BLD-MCNC: 14.4 G/DL (ref 12–16)
HGB UR QL STRIP: NEGATIVE
KETONES UR QL STRIP.AUTO: NEGATIVE MG/DL
LEUKOCYTE ESTERASE UR QL STRIP.AUTO: NEGATIVE
LIPASE SERPL-CCNC: 180 U/L (ref 73–393)
LYMPHOCYTES # BLD: 1.7 K/UL (ref 0.9–3.6)
LYMPHOCYTES NFR BLD: 30 % (ref 21–52)
MCH RBC QN AUTO: 27.7 PG (ref 24–34)
MCHC RBC AUTO-ENTMCNC: 32.1 G/DL (ref 31–37)
MCV RBC AUTO: 86.5 FL (ref 74–97)
MONOCYTES # BLD: 0.4 K/UL (ref 0.05–1.2)
MONOCYTES NFR BLD: 6 % (ref 3–10)
NEUTS SEG # BLD: 3.5 K/UL (ref 1.8–8)
NEUTS SEG NFR BLD: 62 % (ref 40–73)
NITRITE UR QL STRIP.AUTO: NEGATIVE
PH UR STRIP: 5.5 [PH] (ref 5–8)
PLATELET # BLD AUTO: 340 K/UL (ref 135–420)
PMV BLD AUTO: 10.3 FL (ref 9.2–11.8)
POTASSIUM SERPL-SCNC: 4.7 MMOL/L (ref 3.5–5.5)
PROT SERPL-MCNC: 7.8 G/DL (ref 6.4–8.2)
PROT UR STRIP-MCNC: NEGATIVE MG/DL
RBC # BLD AUTO: 5.19 M/UL (ref 4.2–5.3)
SODIUM SERPL-SCNC: 134 MMOL/L (ref 136–145)
SP GR UR REFRACTOMETRY: 1.02 (ref 1–1.03)
UROBILINOGEN UR QL STRIP.AUTO: 0.2 EU/DL (ref 0.2–1)
WBC # BLD AUTO: 5.6 K/UL (ref 4.6–13.2)

## 2019-07-10 PROCEDURE — 99283 EMERGENCY DEPT VISIT LOW MDM: CPT

## 2019-07-10 PROCEDURE — 85025 COMPLETE CBC W/AUTO DIFF WBC: CPT

## 2019-07-10 PROCEDURE — 81003 URINALYSIS AUTO W/O SCOPE: CPT

## 2019-07-10 PROCEDURE — 83690 ASSAY OF LIPASE: CPT

## 2019-07-10 PROCEDURE — 74177 CT ABD & PELVIS W/CONTRAST: CPT

## 2019-07-10 PROCEDURE — 96374 THER/PROPH/DIAG INJ IV PUSH: CPT

## 2019-07-10 PROCEDURE — 80053 COMPREHEN METABOLIC PANEL: CPT

## 2019-07-10 PROCEDURE — 74011250636 HC RX REV CODE- 250/636: Performed by: PHYSICIAN ASSISTANT

## 2019-07-10 PROCEDURE — 74011636320 HC RX REV CODE- 636/320: Performed by: EMERGENCY MEDICINE

## 2019-07-10 RX ORDER — BISACODYL 5 MG
TABLET, DELAYED RELEASE (ENTERIC COATED) ORAL
Qty: 20 TAB | Refills: 0 | Status: SHIPPED | OUTPATIENT
Start: 2019-07-10 | End: 2019-07-30

## 2019-07-10 RX ORDER — MORPHINE SULFATE 2 MG/ML
2 INJECTION, SOLUTION INTRAMUSCULAR; INTRAVENOUS
Status: COMPLETED | OUTPATIENT
Start: 2019-07-10 | End: 2019-07-10

## 2019-07-10 RX ORDER — POLYETHYLENE GLYCOL 3350 17 G/17G
17 POWDER, FOR SOLUTION ORAL DAILY
Qty: 119 G | Refills: 0 | Status: SHIPPED | OUTPATIENT
Start: 2019-07-10 | End: 2019-07-30

## 2019-07-10 RX ADMIN — IOPAMIDOL 93 ML: 612 INJECTION, SOLUTION INTRAVENOUS at 17:06

## 2019-07-10 RX ADMIN — MORPHINE SULFATE 2 MG: 2 INJECTION, SOLUTION INTRAMUSCULAR; INTRAVENOUS at 15:46

## 2019-07-10 NOTE — PROGRESS NOTES
Pt states she has left lower abdominal pain and had nausea and vomiting this morning. Onset this morning. She has not taken pain med today.

## 2019-07-10 NOTE — ED PROVIDER NOTES
EMERGENCY DEPARTMENT HISTORY AND PHYSICAL EXAM    Date: 7/10/2019  Patient Name: Isis Eisenberg    History of Presenting Illness     No chief complaint on file. History Provided By: Patient      Additional History (Context): Isis Eisenberg is a 80-year-old female complaining of left lower quadrant pain for the past few days. H/o abdominal adhesions, anemia, asthma, diabetes, hypertension, GERD, menopause, arthritis. Pt states pain is in left lower abdomen, associated with some diarrhea, no vomitig. Last BM was this mornign and slightly watery. States she has been spitting up \"clear spit\" but no vomit. Pt denies constipation. No cp, sob, headache, dizziness, fever, chills, urinary or vaginal complaints. H/o similar pain in past and has a h/o SBO. Selena Lopez Has been tolerating PO without difficulty. No other complaints at this time. Pt denies any fevers or chills, headache, dizziness or light headedness, ENT issues, CP or discomfort, SOB, cough, diaphoresis, melena/hematochezia, dysuria, hematuria, frequency, focal weakness/numbness/tingling, or rash. Patient has no other complaints at this time. PCP: Melly Harrell MD    Current Outpatient Medications   Medication Sig Dispense Refill    amLODIPine (NORVASC) 5 mg tablet Take 5 mg by mouth daily.  multivitamin (ONE A DAY) tablet Take 1 Tab by mouth daily.  ondansetron hcl (ZOFRAN) 4 mg tablet Take 1 Tab by mouth every eight (8) hours as needed for Nausea. 12 Tab 0    ALLERGY RELIEF, CETIRIZINE, 10 mg tablet take 1 tablet by mouth once daily 90 Tab 3    metFORMIN (GLUMETZA ER) 500 mg TG24 24 hour tablet take 1 tablet by mouth twice a day 60 Tab 2    bisoprolol-hydroCHLOROthiazide (ZIAC) 2.5-6.25 mg per tablet Take 1 Tab by mouth daily.  90 Tab 3    omeprazole (PRILOSEC) 10 mg capsule take 1 capsule by mouth once daily 30 Cap 11    FARXIGA 10 mg tab tablet take 1 tablet by mouth once daily 90 Tab 3    amLODIPine (NORVASC) 2.5 mg tablet take 1 tablet by mouth once daily NIGHTLY 90 Tab 1    albuterol (PROVENTIL HFA, VENTOLIN HFA, PROAIR HFA) 90 mcg/actuation inhaler Take 1 Puff by inhalation every six (6) hours as needed for Wheezing. 1 Inhaler 0    folic acid (FOLVITE) 1 mg tablet Take  by mouth daily.  hydroxychloroquine (PLAQUENIL) 200 mg tablet Take 200 mg by mouth daily.  methotrexate (RHEUMATREX) 2.5 mg tablet Take 5 mg by mouth every Tuesday.  sulfaSALAzine (AZULFIDINE) 500 mg tablet Take 500 mg by mouth two (2) times a day.  glucose blood VI test strips (ONETOUCH ULTRA TEST) strip Check blood glucose as directed 100 Strip 1    cyclobenzaprine (FLEXERIL) 10 mg tablet Take 1 Tab by mouth three (3) times daily as needed for Muscle Spasm(s). 20 Tab 0    calamine-zinc oxide (CALAMINE) solution aaa tid prn itching 177 mL 0    doxepin (SINEQUAN) 10 mg capsule Take 1 Cap by mouth nightly.  30 Cap 11    HYDROcodone-acetaminophen (NORCO) 5-325 mg per tablet take 1 tablet by mouth every 4 hours if needed 12 Tab 0       Past History     Past Medical History:  Past Medical History:   Diagnosis Date    Abdominal adhesions     Anemia     Asthma     Diabetes mellitus     Dyskinesia     bilateral    Essential hypertension     GERD (gastroesophageal reflux disease)     Hypertension     Menopause     Microhematuria     Rheumatoid arteritis 2018    Stool color black        Past Surgical History:  Past Surgical History:   Procedure Laterality Date    COLONOSCOPY N/A 1/21/2019    COLON[facility] performed [facility] ENDOSCOPY    HX CHOLECYSTECTOMY  6/28/10    HX COLONOSCOPY      HX ENDOSCOPY      HX HERNIA REPAIR      HX HYSTERECTOMY      Fibroids    HX KNEE REPLACEMENT Left     HX KNEE REPLACEMENT Right 06/2018    HX OOPHORECTOMY  12/2000    HX ORTHOPAEDIC Right     ankle- multiple surgeries    HX SMALL BOWEL RESECTION  12/2000    HX SMALL BOWEL RESECTION  02/21/2017    Dr. Brandi Ricks LOCALIZATION-EXCISION         Family History:  Family History   Problem Relation Age of Onset    Hypertension Other         parent    Breast Cancer Other 21    Heart Disease Father     Diabetes Father     Lung Disease Father     Diabetes Mother     Hypertension Other         sibling    Diabetes Other         parent    Kidney Disease Brother     Diabetes Brother     Lung Disease Brother        Social History:  Social History     Tobacco Use    Smoking status: Never Smoker    Smokeless tobacco: Never Used   Substance Use Topics    Alcohol use: No    Drug use: No       Allergies: Allergies   Allergen Reactions    Macrodantin [Nitrofurantoin Macrocrystalline] Itching and Other (comments)    Tape [Adhesive] Other (comments)     Paper tape-- feels like burning skin  Burned skin when had wound vac         Review of Systems     Review of Systems   Constitutional: Negative for chills and fever. HENT: Negative for nasal congestion, sore throat, rhinorrhea  Eyes: Negative. Respiratory: negative  cough and negative for shortness of breath. Cardiovascular: Negative for chest pain and palpitations. Gastrointestinal: Positive for abdominal pain, Negative for constipation, diarrhea, denies nausea and denies vomiting. Genitourinary: Negative. Negative for difficulty urinating and flank pain. Musculoskeletal: Negative for back pain. Negative for gait problem and neck pain. Skin: Negative. Allergic/Immunologic: Negative. Neurological: Negative for dizziness, weakness, numbness and headaches. Psychiatric/Behavioral: Negative. All other systems reviewed and are negative.   All Other Systems Negative  Physical Exam     Vitals:    07/10/19 1414 07/10/19 1604   BP: (!) 145/100 120/86   Pulse: 98 84   Resp: 16 16   Temp: 97.2 °F (36.2 °C) 97.6 °F (36.4 °C)   SpO2: 99% 100%   Weight: 72.6 kg (160 lb)    Height: 5' 4\" (1.626 m)      Physical Exam   Constitutional: She is oriented to person, place, and time. She appears well-developed and well-nourished. No distress. HENT:   Head: Normocephalic and atraumatic. Nose: Nose normal.   Eyes: Pupils are equal, round, and reactive to light. Conjunctivae and EOM are normal.   Neck: Normal range of motion. Neck supple. Cardiovascular: Normal rate and regular rhythm. Pulmonary/Chest: Effort normal and breath sounds normal. No respiratory distress. Abdominal: Soft. Normal appearance and bowel sounds are normal. There is no hepatosplenomegaly. There is tenderness in the left lower quadrant. There is no rebound, no guarding, no CVA tenderness, no tenderness at McBurney's point and negative Feldman's sign. No hernia. Hernia confirmed negative in the ventral area. Musculoskeletal: Normal range of motion. Neurological: She is alert and oriented to person, place, and time. No cranial nerve deficit. Coordination normal.   Skin: Skin is warm. No rash noted. She is not diaphoretic. Psychiatric: She has a normal mood and affect. Her behavior is normal.   Nursing note and vitals reviewed.         Diagnostic Study Results     Labs -     Recent Results (from the past 12 hour(s))   URINALYSIS W/ RFLX MICROSCOPIC    Collection Time: 07/10/19  2:05 PM   Result Value Ref Range    Color YELLOW      Appearance CLEAR      Specific gravity 1.023 1.005 - 1.030      pH (UA) 5.5 5.0 - 8.0      Protein NEGATIVE  NEG mg/dL    Glucose >1,000 (A) NEG mg/dL    Ketone NEGATIVE  NEG mg/dL    Bilirubin NEGATIVE  NEG      Blood NEGATIVE  NEG      Urobilinogen 0.2 0.2 - 1.0 EU/dL    Nitrites NEGATIVE  NEG      Leukocyte Esterase NEGATIVE  NEG     CBC WITH AUTOMATED DIFF    Collection Time: 07/10/19  2:56 PM   Result Value Ref Range    WBC 5.6 4.6 - 13.2 K/uL    RBC 5.19 4.20 - 5.30 M/uL    HGB 14.4 12.0 - 16.0 g/dL    HCT 44.9 35.0 - 45.0 %    MCV 86.5 74.0 - 97.0 FL    MCH 27.7 24.0 - 34.0 PG    MCHC 32.1 31.0 - 37.0 g/dL    RDW 16.0 (H) 11.6 - 14.5 %    PLATELET 656 662 - 033 K/uL    MPV 10.3 9.2 - 11.8 FL    NEUTROPHILS 62 40 - 73 %    LYMPHOCYTES 30 21 - 52 %    MONOCYTES 6 3 - 10 %    EOSINOPHILS 2 0 - 5 %    BASOPHILS 0 0 - 2 %    ABS. NEUTROPHILS 3.5 1.8 - 8.0 K/UL    ABS. LYMPHOCYTES 1.7 0.9 - 3.6 K/UL    ABS. MONOCYTES 0.4 0.05 - 1.2 K/UL    ABS. EOSINOPHILS 0.1 0.0 - 0.4 K/UL    ABS. BASOPHILS 0.0 0.0 - 0.1 K/UL    DF AUTOMATED         Radiologic Studies -   CT ABD PELV W CONT    (Results Pending)     CT Results  (Last 48 hours)    None        CXR Results  (Last 48 hours)    None            Medical Decision Making   I am the first provider for this patient. I reviewed the vital signs, available nursing notes, past medical history, past surgical history, family history and social history. Vital Signs-Reviewed the patient's vital signs. Records Reviewed: Nursing notes, old medical records and any previous labs, imaging, visits, consultations pertinent to patient care    Procedures:  Procedures    Provider Notes (Medical Decision Making):     Pt with  Noted PMH presents ambulatory in NAD, well-hydrated, non-toxic in appearance, with normal vitals. Here with LLQ pain, no associated sx. Benign exam of abdomen with minimal LLQ pain and no peritoneal signs. Will obtain labs, urine and imaging to r/o acute pathology. Pain control and reassessment. 4:52 PM : Pt care transferred to Anaya Bellamy  ,ED provider. History of patient complaint(s), available diagnostic reports and current treatment plan has been discussed thoroughly. Bedside rounding on patient occured : no . Intended disposition of patient : Home, ADMIT, Transfer, Extended Care Facility, TBD, AMA and ELOPED  Pending diagnostics reports and/or labs (please list): CT abdomen/pelvis. Vlad Vazquez PA-C 4:53 PM         MED RECONCILIATION:  No current facility-administered medications for this encounter. Current Outpatient Medications   Medication Sig    amLODIPine (NORVASC) 5 mg tablet Take 5 mg by mouth daily.     multivitamin (ONE A DAY) tablet Take 1 Tab by mouth daily.  ondansetron hcl (ZOFRAN) 4 mg tablet Take 1 Tab by mouth every eight (8) hours as needed for Nausea.  ALLERGY RELIEF, CETIRIZINE, 10 mg tablet take 1 tablet by mouth once daily    metFORMIN (GLUMETZA ER) 500 mg TG24 24 hour tablet take 1 tablet by mouth twice a day    bisoprolol-hydroCHLOROthiazide (ZIAC) 2.5-6.25 mg per tablet Take 1 Tab by mouth daily.  omeprazole (PRILOSEC) 10 mg capsule take 1 capsule by mouth once daily    FARXIGA 10 mg tab tablet take 1 tablet by mouth once daily    amLODIPine (NORVASC) 2.5 mg tablet take 1 tablet by mouth once daily NIGHTLY    albuterol (PROVENTIL HFA, VENTOLIN HFA, PROAIR HFA) 90 mcg/actuation inhaler Take 1 Puff by inhalation every six (6) hours as needed for Wheezing.  folic acid (FOLVITE) 1 mg tablet Take  by mouth daily.  hydroxychloroquine (PLAQUENIL) 200 mg tablet Take 200 mg by mouth daily.  methotrexate (RHEUMATREX) 2.5 mg tablet Take 5 mg by mouth every Tuesday.  sulfaSALAzine (AZULFIDINE) 500 mg tablet Take 500 mg by mouth two (2) times a day.  glucose blood VI test strips (ONETOUCH ULTRA TEST) strip Check blood glucose as directed    cyclobenzaprine (FLEXERIL) 10 mg tablet Take 1 Tab by mouth three (3) times daily as needed for Muscle Spasm(s).  calamine-zinc oxide (CALAMINE) solution aaa tid prn itching    doxepin (SINEQUAN) 10 mg capsule Take 1 Cap by mouth nightly.  HYDROcodone-acetaminophen (NORCO) 5-325 mg per tablet take 1 tablet by mouth every 4 hours if needed       Disposition:  home    DISCHARGE NOTE:     Pt has been reexamined. Patient has no new complaints, changes, or physical findings. Care plan outlined and precautions discussed. Discussed proper way to take medications. Discussed treatment plan, return precautions, symptomatic relief, and expected time to improvement. All questions answered. Patient is stable for discharge and outpatient management. Patient is ready to go home. Follow-up Information    None         Current Discharge Medication List                Diagnosis     Clinical Impression: TBD    Dictation disclaimer:  Please note that this dictation was completed with [employer], the computer voice recognition software. Quite often unanticipated grammatical, syntax, homophones, and other interpretive errors are inadvertently transcribed by the computer software. Please disregard these errors. Please excuse any errors that have escaped final proofreading.

## 2019-07-10 NOTE — ED NOTES
Patient stated understanding of discharge instructions. Patient was ambulatory upon discharge. Patient received two prescriptions. Patient armband removed and shredded    Patient was ambulatory upon discharge.

## 2019-07-10 NOTE — DISCHARGE INSTRUCTIONS
Patient Education     Please return immediately to the Emergency Room for re-evaluation if you are not improving, develop any new symptoms, or develop worsening of current symptoms! If you have been prescribed a medication and are unable to take this medication for any reason, please return to the Emergency Department for further evaluation! If you have been referred for follow-up to a specialist, but are unable to follow-up and your symptoms are either not improving or are worsening, please return to the Emergency Department for further evaluation! Learning About High Blood Sugar  What is high blood sugar? Your body turns the food you eat into glucose (sugar), which it uses for energy. But if your body isn't able to use the sugar right away, it can build up in your blood and lead to high blood sugar. When the amount of sugar in your blood stays too high for too much of the time, you may have diabetes. Diabetes is a disease that can cause serious health problems. The good news is that lifestyle changes may help you get your blood sugar back to normal and avoid or delay diabetes. What causes high blood sugar? Sugar (glucose) can build up in your blood if you:  · Are overweight. · Have a family history of diabetes. · Take certain medicines, such as steroids. What are the symptoms? Having high blood sugar may not cause any symptoms at all. Or it may make you feel very thirsty or very hungry. You may also urinate more often than usual, have blurry vision, or lose weight without trying. How is high blood sugar treated? You can take steps to lower your blood sugar level if you understand what makes it get higher. Your doctor may want you to learn how to test your blood sugar level at home. Then you can see how illness, stress, or different kinds of food or medicine raise or lower your blood sugar level. Other tests may be needed to see if you have diabetes.   How can you prevent high blood sugar?  · Watch your weight. If you're overweight, losing just a small amount of weight may help. Reducing fat around your waist is most important. · Limit the amount of calories, sweets, and unhealthy fat you eat. Ask your doctor if a dietitian can help you. A registered dietitian can help you create meal plans that fit your lifestyle. · Get at least 30 minutes of exercise on most days of the week. Exercise helps control your blood sugar. It also helps you maintain a healthy weight. Walking is a good choice. You also may want to do other activities, such as running, swimming, cycling, or playing tennis or team sports. · If your doctor prescribed medicines, take them exactly as prescribed. Call your doctor if you think you are having a problem with your medicine. You will get more details on the specific medicines your doctor prescribes. Follow-up care is a key part of your treatment and safety. Be sure to make and go to all appointments, and call your doctor if you are having problems. It's also a good idea to know your test results and keep a list of the medicines you take. Where can you learn more? Go to http://betzy-didi.info/. Enter O108 in the search box to learn more about \"Learning About High Blood Sugar. \"  Current as of: July 25, 2018  Content Version: 11.9  © 0202-8540 [employer]. Care instructions adapted under license by [employer] (which disclaims liability or warranty for this information). If you have questions about a medical condition or this instruction, always ask your healthcare professional. [place] [address] any warranty or liability for your use of this information. Patient Education        Learning About High Blood Sugar  What is high blood sugar? Your body turns the food you eat into glucose (sugar), which it uses for energy.  But if your body isn't able to use the sugar right away, it can build up in your blood and lead to high blood sugar. When the amount of sugar in your blood stays too high for too much of the time, you may have diabetes. Diabetes is a disease that can cause serious health problems. The good news is that lifestyle changes may help you get your blood sugar back to normal and avoid or delay diabetes. What causes high blood sugar? Sugar (glucose) can build up in your blood if you:  · Are overweight. · Have a family history of diabetes. · Take certain medicines, such as steroids. What are the symptoms? Having high blood sugar may not cause any symptoms at all. Or it may make you feel very thirsty or very hungry. You may also urinate more often than usual, have blurry vision, or lose weight without trying. How is high blood sugar treated? You can take steps to lower your blood sugar level if you understand what makes it get higher. Your doctor may want you to learn how to test your blood sugar level at home. Then you can see how illness, stress, or different kinds of food or medicine raise or lower your blood sugar level. Other tests may be needed to see if you have diabetes. How can you prevent high blood sugar? · Watch your weight. If you're overweight, losing just a small amount of weight may help. Reducing fat around your waist is most important. · Limit the amount of calories, sweets, and unhealthy fat you eat. Ask your doctor if a dietitian can help you. A registered dietitian can help you create meal plans that fit your lifestyle. · Get at least 30 minutes of exercise on most days of the week. Exercise helps control your blood sugar. It also helps you maintain a healthy weight. Walking is a good choice. You also may want to do other activities, such as running, swimming, cycling, or playing tennis or team sports. · If your doctor prescribed medicines, take them exactly as prescribed. Call your doctor if you think you are having a problem with your medicine.  You will get more details on the specific medicines your doctor prescribes. Follow-up care is a key part of your treatment and safety. Be sure to make and go to all appointments, and call your doctor if you are having problems. It's also a good idea to know your test results and keep a list of the medicines you take. Where can you learn more? Go to http://betzy-didi.info/. Enter O108 in the search box to learn more about \"Learning About High Blood Sugar. \"  Current as of: July 25, 2018  Content Version: 11.9  © 2529-6782 [employer]. Care instructions adapted under license by [employer] (which disclaims liability or warranty for this information). If you have questions about a medical condition or this instruction, always ask your healthcare professional. [address] any warranty or liability for your use of this information. Patient Education        Constipation: Care Instructions  Your Care Instructions    Constipation means that you have a hard time passing stools (bowel movements). People pass stools from 3 times a day to once every 3 days. What is normal for you may be different. Constipation may occur with pain in the rectum and cramping. The pain may get worse when you try to pass stools. Sometimes there are small amounts of bright red blood on toilet paper or the surface of stools. This is because of enlarged veins near the rectum (hemorrhoids). A few changes in your diet and lifestyle may help you avoid ongoing constipation. Your doctor may also prescribe medicine to help loosen your stool. Some medicines can cause constipation. These include pain medicines and antidepressants. Tell your doctor about all the medicines you take. Your doctor may want to make a medicine change to ease your symptoms. Follow-up care is a key part of your treatment and safety. Be sure to make and go to all appointments, and call your doctor if you are having problems.  It's also a good idea to know your test results and keep a list of the medicines you take. How can you care for yourself at home? · Drink plenty of fluids, enough so that your urine is light yellow or clear like water. If you have kidney, heart, or liver disease and have to limit fluids, talk with your doctor before you increase the amount of fluids you drink. · Include high-fiber foods in your diet each day. These include fruits, vegetables, beans, and whole grains. · Get at least 30 minutes of exercise on most days of the week. Walking is a good choice. You also may want to do other activities, such as running, swimming, cycling, or playing tennis or team sports. · Take a fiber supplement, such as Citrucel or Metamucil, every day. Read and follow all instructions on the label. · Schedule time each day for a bowel movement. A daily routine may help. Take your time having your bowel movement. · Support your feet with a small step stool when you sit on the toilet. This helps flex your hips and places your pelvis in a squatting position. · Your doctor may recommend an over-the-counter laxative to relieve your constipation. Examples are Milk of Magnesia and MiraLax. Read and follow all instructions on the label. Do not use laxatives on a long-term basis. When should you call for help? Call your doctor now or seek immediate medical care if:    · You have new or worse belly pain.     · You have new or worse nausea or vomiting.     · You have blood in your stools.    Watch closely for changes in your health, and be sure to contact your doctor if:    · Your constipation is getting worse.     · You do not get better as expected. Where can you learn more? Go to http://betzy-didi.info/. Enter 21  in the search box to learn more about \"Constipation: Care Instructions. \"  Current as of: September 23, 2018  Content Version: 11.9  © 4917-3853 [employer].  Care instructions adapted under license by [address] (which disclaims liability or warranty for this information). If you have questions about a medical condition or this instruction, always ask your healthcare professional. [address] any warranty or liability for your use of this information.

## 2019-07-10 NOTE — ED NOTES
0500 PM :Pt care assumed from Amisha Orellana, ED provider. Pt complaint(s), current treatment plan, progression and available diagnostic results have been discussed thoroughly. Rounding occurred: no  Intended Disposition: Home  Pending diagnostic reports and/or labs (please list): pending CT AP. Alfonso Carrillo PA-C     6:01 PM:  CT AP reveals constipation, but nothing else acute. Will DC home with miralax and dulcolax. Recent Results (from the past 12 hour(s))   URINALYSIS W/ RFLX MICROSCOPIC    Collection Time: 07/10/19  2:05 PM   Result Value Ref Range    Color YELLOW      Appearance CLEAR      Specific gravity 1.023 1.005 - 1.030      pH (UA) 5.5 5.0 - 8.0      Protein NEGATIVE  NEG mg/dL    Glucose >1,000 (A) NEG mg/dL    Ketone NEGATIVE  NEG mg/dL    Bilirubin NEGATIVE  NEG      Blood NEGATIVE  NEG      Urobilinogen 0.2 0.2 - 1.0 EU/dL    Nitrites NEGATIVE  NEG      Leukocyte Esterase NEGATIVE  NEG     CBC WITH AUTOMATED DIFF    Collection Time: 07/10/19  2:56 PM   Result Value Ref Range    WBC 5.6 4.6 - 13.2 K/uL    RBC 5.19 4.20 - 5.30 M/uL    HGB 14.4 12.0 - 16.0 g/dL    HCT 44.9 35.0 - 45.0 %    MCV 86.5 74.0 - 97.0 FL    MCH 27.7 24.0 - 34.0 PG    MCHC 32.1 31.0 - 37.0 g/dL    RDW 16.0 (H) 11.6 - 14.5 %    PLATELET 463 944 - 000 K/uL    MPV 10.3 9.2 - 11.8 FL    NEUTROPHILS 62 40 - 73 %    LYMPHOCYTES 30 21 - 52 %    MONOCYTES 6 3 - 10 %    EOSINOPHILS 2 0 - 5 %    BASOPHILS 0 0 - 2 %    ABS. NEUTROPHILS 3.5 1.8 - 8.0 K/UL    ABS. LYMPHOCYTES 1.7 0.9 - 3.6 K/UL    ABS. MONOCYTES 0.4 0.05 - 1.2 K/UL    ABS. EOSINOPHILS 0.1 0.0 - 0.4 K/UL    ABS.  BASOPHILS 0.0 0.0 - 0.1 K/UL    DF AUTOMATED     METABOLIC PANEL, COMPREHENSIVE    Collection Time: 07/10/19  2:56 PM   Result Value Ref Range    Sodium 134 (L) 136 - 145 mmol/L    Potassium 4.7 3.5 - 5.5 mmol/L    Chloride 100 100 - 108 mmol/L    CO2 28 21 - 32 mmol/L    Anion gap 6 3.0 - 18 mmol/L    Glucose 153 (H) 74 - 99 mg/dL    BUN 13 7.0 - 18 MG/DL Creatinine 1.21 0.6 - 1.3 MG/DL    BUN/Creatinine ratio 11 (L) 12 - 20      GFR est AA 55 (L) >60 ml/min/1.73m2    GFR est non-AA 46 (L) >60 ml/min/1.73m2    Calcium 9.3 8.5 - 10.1 MG/DL    Bilirubin, total 0.4 0.2 - 1.0 MG/DL    ALT (SGPT) 32 13 - 56 U/L    AST (SGOT) 24 15 - 37 U/L    Alk.  phosphatase 159 (H) 45 - 117 U/L    Protein, total 7.8 6.4 - 8.2 g/dL    Albumin 3.7 3.4 - 5.0 g/dL    Globulin 4.1 (H) 2.0 - 4.0 g/dL    A-G Ratio 0.9 0.8 - 1.7     LIPASE    Collection Time: 07/10/19  2:56 PM   Result Value Ref Range    Lipase 180 73 - 393 U/L

## 2019-07-17 ENCOUNTER — OFFICE VISIT (OUTPATIENT)
Dept: OBGYN CLINIC | Age: 59
End: 2019-07-17

## 2019-07-17 ENCOUNTER — HOSPITAL ENCOUNTER (OUTPATIENT)
Dept: LAB | Age: 59
Discharge: HOME | End: 2019-07-17

## 2019-07-17 VITALS
TEMPERATURE: 97.9 F | HEART RATE: 87 BPM | DIASTOLIC BLOOD PRESSURE: 93 MMHG | WEIGHT: 160 LBS | HEIGHT: 64 IN | BODY MASS INDEX: 27.31 KG/M2 | SYSTOLIC BLOOD PRESSURE: 134 MMHG | RESPIRATION RATE: 19 BRPM

## 2019-07-17 DIAGNOSIS — Z01.818 PRE-OP TESTING: Primary | ICD-10-CM

## 2019-07-17 DIAGNOSIS — Z01.818 PRE-OP TESTING: ICD-10-CM

## 2019-07-17 LAB — SENTARA SPECIMEN COL,SENBCF: NORMAL

## 2019-07-17 PROCEDURE — 99001 SPECIMEN HANDLING PT-LAB: CPT

## 2019-07-17 NOTE — PROGRESS NOTES
60 y/o with known ovarian cyst and chronic pelvic pain presents to the office for follow-up. She continues to have intractable pain and would like surgical removal of her remaining ovary. She has had major surgical complications including bowel resection and reanastomosis and extended hospitalization. She states the pain is unbearable and diffuse. Recent trip to the ED reveals she has a stable left ovarian cyst.    Visit Vitals  BP (!) 134/93   Pulse 87   Temp 97.9 °F (36.6 °C) (Oral)   Resp 19   Ht 5' 4\" (1.626 m)   Wt 160 lb (72.6 kg)   BMI 27.46 kg/m²     Gen: A&Ox3, NAD  Exam deferred    CT scan: PELVIC ORGANS: Hysterectomy. There is a left adnexal cyst measuring 4.1 x 2.9  cm. There is no free fluid. The urinary bladder is unremarkable.     There is mild stranding of the omentum in the lower anterior abdomen. This is  likely secondary to prior surgery and scarring.     A/P:  60 y/o with chronic pain and and ovarian cyst.  Re-iterated that the cyst seems stable, but patient adamant she wants it surgically removed. Will coordinate with General Surgery for a joint surgery with Dr. Dorys Cornejo. The plan of care has been discussed with the patient. She has been given the opportunity to ask questions, which seem to have been answered satisfactorily.

## 2019-07-18 ENCOUNTER — TELEPHONE (OUTPATIENT)
Dept: SURGERY | Age: 59
End: 2019-07-18

## 2019-07-18 LAB
AVG GLU, 10930: 192 MG/DL (ref 91–123)
HBA1C MFR BLD HPLC: 8.3 % (ref 4.8–5.9)

## 2019-07-18 NOTE — TELEPHONE ENCOUNTER
Ms. Yenifer Ramirez called stating she was seen by Dr. Shannon Nassar and need surgery (oohphorectomy) and indicate Dr. Alfred Valenzuela office will be contacting Dr. Tara Dakins to coordinate surgery for Dr. Tara Dakins to remove the adhesions. I informed Ms. Yenifer Ramirez Dr. Tara Dakins is in surgery today and Dr. Tara Dakins routine OR days are Tuesday and Thursdays and he may be able to assist Dr. Shannon Nassar on a Friday. I told Ms. Damian I'll contact Dr. Анна Montenegro surgery scheduler to inquire about procedure date. Ms. Yenifer Ramriez states she would like to schedule her surgery next Tuesday, July 23, 2019.

## 2019-07-19 ENCOUNTER — TELEPHONE (OUTPATIENT)
Dept: OBGYN CLINIC | Age: 59
End: 2019-07-19

## 2019-07-19 DIAGNOSIS — T38.0X5A STEROID-INDUCED DIABETES (HCC): ICD-10-CM

## 2019-07-19 DIAGNOSIS — E09.9 STEROID-INDUCED DIABETES (HCC): ICD-10-CM

## 2019-07-19 RX ORDER — METFORMIN HYDROCHLORIDE 500 MG/1
TABLET, FILM COATED, EXTENDED RELEASE ORAL
Qty: 60 TAB | Refills: 2 | Status: CANCELLED | OUTPATIENT
Start: 2019-07-19

## 2019-07-19 NOTE — TELEPHONE ENCOUNTER
Patient just wanted to let you know that she received the message about her surgery and spoke to Dr. Miquel Ricci.

## 2019-07-22 ENCOUNTER — OFFICE VISIT (OUTPATIENT)
Dept: FAMILY MEDICINE CLINIC | Age: 59
End: 2019-07-22

## 2019-07-22 VITALS
DIASTOLIC BLOOD PRESSURE: 82 MMHG | SYSTOLIC BLOOD PRESSURE: 123 MMHG | RESPIRATION RATE: 17 BRPM | BODY MASS INDEX: 26.7 KG/M2 | WEIGHT: 156.4 LBS | HEART RATE: 76 BPM | HEIGHT: 64 IN | TEMPERATURE: 98.2 F | OXYGEN SATURATION: 98 %

## 2019-07-22 DIAGNOSIS — E11.65 TYPE 2 DIABETES MELLITUS WITH HYPERGLYCEMIA, WITHOUT LONG-TERM CURRENT USE OF INSULIN (HCC): Primary | ICD-10-CM

## 2019-07-22 DIAGNOSIS — E09.9 STEROID-INDUCED DIABETES (HCC): ICD-10-CM

## 2019-07-22 DIAGNOSIS — N83.202 CYSTS OF BOTH OVARIES: ICD-10-CM

## 2019-07-22 DIAGNOSIS — Z01.818 PRE-OP EXAM: ICD-10-CM

## 2019-07-22 DIAGNOSIS — M17.12 PRIMARY OSTEOARTHRITIS OF LEFT KNEE: ICD-10-CM

## 2019-07-22 DIAGNOSIS — N83.201 CYSTS OF BOTH OVARIES: ICD-10-CM

## 2019-07-22 DIAGNOSIS — T38.0X5A STEROID-INDUCED DIABETES (HCC): ICD-10-CM

## 2019-07-22 RX ORDER — CYCLOBENZAPRINE HCL 10 MG
10 TABLET ORAL
Qty: 20 TAB | Refills: 0 | Status: SHIPPED | OUTPATIENT
Start: 2019-07-22 | End: 2019-09-18 | Stop reason: SDUPTHER

## 2019-07-22 RX ORDER — METFORMIN HYDROCHLORIDE 500 MG/1
2 TABLET, FILM COATED, EXTENDED RELEASE ORAL 2 TIMES DAILY
Qty: 120 TAB | Refills: 11 | Status: SHIPPED | OUTPATIENT
Start: 2019-07-22 | End: 2020-02-27 | Stop reason: SDUPTHER

## 2019-07-22 NOTE — PROGRESS NOTES
Leon Flores is a 61 y.o female that is present in the office for a routine appointment for pre-op clearance and medication evaluation. Patient complains of pelvic pain. 1. Have you been to the ER, urgent care clinic since your last visit? Hospitalized since your last visit? No    2. Have you seen or consulted any other health care providers outside of the [address] [place] [address] since your last visit? Include any pap smears or colon screening.  No    Health Maintenance Due   Topic Date Due    DTaP/Tdap/Td series (1 - Tdap) 02/04/1981    PAP AKA CERVICAL CYTOLOGY  02/04/1981    Shingrix Vaccine Age 50> (1 of 2) 02/04/2010    EYE EXAM RETINAL OR DILATED  05/29/2019    FOOT EXAM Q1  06/12/2019    MICROALBUMIN Q1  06/12/2019    LIPID PANEL Q1  06/12/2019

## 2019-07-22 NOTE — PROGRESS NOTES
Bernadine Swain is a 61 y.o.  female and presents with    Chief Complaint   Patient presents with    Medication Evaluation    Pre-op Exam     Subjective:  Mrs. Abhijit Diamond presents for pre-op exam.  She is scheduled for surgery for ovarian cysts. She has chronic abdominal pain. ROS   Constitutional: Negative for chills and fever. HENT: Negative for nasal congestion, sore throat, rhinorrhea  Eyes: Negative. Respiratory: negative  cough and negative for shortness of breath. Cardiovascular: Negative for chest pain and palpitations. Gastrointestinal: Positive for abdominal pain, Negative for constipation, diarrhea, denies nausea and denies vomiting. Genitourinary: Negative. Negative for difficulty urinating and flank pain. Musculoskeletal: Negative for back pain. Negative for gait problem and neck pain. Skin: Negative. Allergic/Immunologic: Negative. Neurological: Negative for dizziness, weakness, numbness and headaches. Psychiatric/Behavioral: Negative. All other systems reviewed and are negative. Objective:  Vitals:    07/22/19 1509   BP: 123/82   Pulse: 76   Resp: 17   Temp: 98.2 °F (36.8 °C)   TempSrc: Oral   SpO2: 98%   Weight: 156 lb 6.4 oz (70.9 kg)   Height: 5' 4\" (1.626 m)   PainSc:  10 - Worst pain ever   PainLoc: Pelvic     Constitutional: She is oriented to person, place, and time. She appears well-developed and well-nourished. No distress. HENT:   Head: Normocephalic and atraumatic. Nose: Nose normal.   Eyes: Pupils are equal, round, and reactive to light. Conjunctivae and EOM are normal.   Neck: Normal range of motion. Neck supple. Cardiovascular: Normal rate and regular rhythm. Pulmonary/Chest: Effort normal and breath sounds normal. No respiratory distress. Abdominal: Soft. Normal appearance and bowel sounds are normal. There is no hepatosplenomegaly. There is tenderness in the left lower quadrant.  There is no rebound, no guarding, no CVA tenderness, no tenderness at McBurney's point and negative Feldman's sign. No hernia. Hernia confirmed negative in the ventral area. Musculoskeletal: Normal range of motion. Neurological: She is alert and oriented to person, place, and time. No cranial nerve deficit. Coordination normal.   Skin: Skin is warm. No rash noted. She is not diaphoretic. Psychiatric: She has a normal mood and affect. Her behavior is normal.    LABS   hgb a1c 8.3    TESTS  ekg - normal sinus rhythm    Assessment/Plan:    1. Primary osteoarthritis of left knee    - cyclobenzaprine (FLEXERIL) 10 mg tablet; Take 1 Tab by mouth three (3) times daily as needed for Muscle Spasm(s). Dispense: 20 Tab; Refill: 0    2. Steroid-induced diabetes ([facility] [address]  worsening control without prednisone on board  - metFORMIN (GLUMETZA ER) 500 mg TG24 24 hour tablet; Take 1,000 mg by mouth two (2) times a day. take 1 tablet by mouth twice a day  Dispense: 120 Tab; Refill: 11    3. Type 2 diabetes mellitus with hyperglycemia, without long-term current use of insulin (HCC)  Goal hgb a1c <7; increase metformin dose    4. Cysts of both ovaries  F/u with gynecology    5. Pre-op exam  Cleared for surgery after reviewing lab tests      Lab review: labs reviewed, I note that glycosylated hemoglobin abnormal high, electrolytes normal, renal functions normal, basic metabolic panel normal      I have discussed the diagnosis with the patient and the intended plan as seen in the above orders. The patient has received an after-visit summary and questions were answered concerning future plans. I have discussed medication side effects and warnings with the patient as well. I have reviewed the plan of care with the patient, accepted their input and they are in agreement with the treatment goals.

## 2019-07-23 ENCOUNTER — TELEPHONE (OUTPATIENT)
Dept: SURGERY | Age: 59
End: 2019-07-23

## 2019-07-23 NOTE — TELEPHONE ENCOUNTER
Dr. Dora Bejarano office called to inform Dr. Stephanie Mcguire want to know if Dr. Arnaldo Virk would be primary surgeon for Ms. Damian surgery. I informed Dr. Arnaldo Virk is currently in surgery and I'll forward a message to Dr. Arnaldo Virk however he has advised Ms. Damian not to have surgery and we were informed by Ms. Damian the surgery was to be performed for a removal of an ovarian cyst by Dr. Dora Bejarano and Dr. Arnaldo Virk was to assist with lysis of adhesions.

## 2019-07-23 NOTE — TELEPHONE ENCOUNTER
Per Dr. Renée Yang contacted/message routed to Dr. Kaykay Watkins surgery scheduler to inform Dr. Renée Yang had previously discussed Ms. Damian case with Dr. Julia Clifford regarding Dr. Kaykay Watkins being the primary surgeon because the surgery was indicated for an adnexal cyst.  However Dr. Renée Yang would be available to assist if need.

## 2019-07-24 ENCOUNTER — HOSPITAL ENCOUNTER (EMERGENCY)
Age: 59
Discharge: HOME | End: 2019-07-24
Attending: EMERGENCY MEDICINE
Payer: COMMERCIAL

## 2019-07-24 ENCOUNTER — OFFICE VISIT (OUTPATIENT)
Dept: SURGERY | Age: 59
End: 2019-07-24

## 2019-07-24 VITALS
HEART RATE: 103 BPM | RESPIRATION RATE: 16 BRPM | OXYGEN SATURATION: 100 % | TEMPERATURE: 98.1 F | DIASTOLIC BLOOD PRESSURE: 83 MMHG | BODY MASS INDEX: 26.8 KG/M2 | WEIGHT: 157 LBS | HEIGHT: 64 IN | SYSTOLIC BLOOD PRESSURE: 124 MMHG

## 2019-07-24 VITALS
SYSTOLIC BLOOD PRESSURE: 139 MMHG | WEIGHT: 157 LBS | HEIGHT: 64 IN | BODY MASS INDEX: 26.8 KG/M2 | DIASTOLIC BLOOD PRESSURE: 84 MMHG | RESPIRATION RATE: 20 BRPM | TEMPERATURE: 97.7 F | HEART RATE: 99 BPM

## 2019-07-24 DIAGNOSIS — G89.29 ABDOMINAL PAIN, CHRONIC, GENERALIZED: Primary | ICD-10-CM

## 2019-07-24 DIAGNOSIS — R10.30 LOWER ABDOMINAL PAIN: Primary | ICD-10-CM

## 2019-07-24 DIAGNOSIS — R10.2 PELVIC PAIN: ICD-10-CM

## 2019-07-24 DIAGNOSIS — R10.84 ABDOMINAL PAIN, CHRONIC, GENERALIZED: Primary | ICD-10-CM

## 2019-07-24 DIAGNOSIS — R11.0 NAUSEA WITHOUT VOMITING: ICD-10-CM

## 2019-07-24 LAB
ALBUMIN SERPL-MCNC: 3.6 G/DL (ref 3.4–5)
ALBUMIN/GLOB SERPL: 1 {RATIO} (ref 0.8–1.7)
ALP SERPL-CCNC: 149 U/L (ref 45–117)
ALT SERPL-CCNC: 21 U/L (ref 13–56)
ANION GAP SERPL CALC-SCNC: 7 MMOL/L (ref 3–18)
AST SERPL-CCNC: 13 U/L (ref 10–38)
BASOPHILS # BLD: 0 K/UL (ref 0–0.1)
BASOPHILS NFR BLD: 0 % (ref 0–2)
BILIRUB SERPL-MCNC: 0.3 MG/DL (ref 0.2–1)
BUN SERPL-MCNC: 15 MG/DL (ref 7–18)
BUN/CREAT SERPL: 15 (ref 12–20)
CALCIUM SERPL-MCNC: 9.5 MG/DL (ref 8.5–10.1)
CHLORIDE SERPL-SCNC: 107 MMOL/L (ref 100–111)
CO2 SERPL-SCNC: 27 MMOL/L (ref 21–32)
CREAT SERPL-MCNC: 1.01 MG/DL (ref 0.6–1.3)
DIFFERENTIAL METHOD BLD: ABNORMAL
EOSINOPHIL # BLD: 0.1 K/UL (ref 0–0.4)
EOSINOPHIL NFR BLD: 2 % (ref 0–5)
ERYTHROCYTE [DISTWIDTH] IN BLOOD BY AUTOMATED COUNT: 17 % (ref 11.6–14.5)
GLOBULIN SER CALC-MCNC: 3.7 G/DL (ref 2–4)
GLUCOSE SERPL-MCNC: 134 MG/DL (ref 74–99)
HCT VFR BLD AUTO: 41.5 % (ref 35–45)
HGB BLD-MCNC: 13.5 G/DL (ref 12–16)
LIPASE SERPL-CCNC: 206 U/L (ref 73–393)
LYMPHOCYTES # BLD: 1.6 K/UL (ref 0.9–3.6)
LYMPHOCYTES NFR BLD: 23 % (ref 21–52)
MCH RBC QN AUTO: 27.7 PG (ref 24–34)
MCHC RBC AUTO-ENTMCNC: 32.5 G/DL (ref 31–37)
MCV RBC AUTO: 85 FL (ref 74–97)
MONOCYTES # BLD: 0.7 K/UL (ref 0.05–1.2)
MONOCYTES NFR BLD: 10 % (ref 3–10)
NEUTS SEG # BLD: 4.5 K/UL (ref 1.8–8)
NEUTS SEG NFR BLD: 65 % (ref 40–73)
PLATELET # BLD AUTO: 285 K/UL (ref 135–420)
PMV BLD AUTO: 9.9 FL (ref 9.2–11.8)
POTASSIUM SERPL-SCNC: 3.5 MMOL/L (ref 3.5–5.5)
PROT SERPL-MCNC: 7.3 G/DL (ref 6.4–8.2)
RBC # BLD AUTO: 4.88 M/UL (ref 4.2–5.3)
SODIUM SERPL-SCNC: 141 MMOL/L (ref 136–145)
WBC # BLD AUTO: 6.8 K/UL (ref 4.6–13.2)

## 2019-07-24 PROCEDURE — 96375 TX/PRO/DX INJ NEW DRUG ADDON: CPT

## 2019-07-24 PROCEDURE — 74011250636 HC RX REV CODE- 250/636: Performed by: PHYSICIAN ASSISTANT

## 2019-07-24 PROCEDURE — 80053 COMPREHEN METABOLIC PANEL: CPT

## 2019-07-24 PROCEDURE — 99282 EMERGENCY DEPT VISIT SF MDM: CPT

## 2019-07-24 PROCEDURE — 96374 THER/PROPH/DIAG INJ IV PUSH: CPT

## 2019-07-24 PROCEDURE — 83690 ASSAY OF LIPASE: CPT

## 2019-07-24 PROCEDURE — 85025 COMPLETE CBC W/AUTO DIFF WBC: CPT

## 2019-07-24 RX ORDER — SODIUM CHLORIDE 9 MG/ML
100 INJECTION, SOLUTION INTRAVENOUS CONTINUOUS
Status: DISCONTINUED | OUTPATIENT
Start: 2019-07-24 | End: 2019-07-24

## 2019-07-24 RX ORDER — KETOROLAC TROMETHAMINE 30 MG/ML
30 INJECTION, SOLUTION INTRAMUSCULAR; INTRAVENOUS
Status: COMPLETED | OUTPATIENT
Start: 2019-07-24 | End: 2019-07-24

## 2019-07-24 RX ORDER — ONDANSETRON 2 MG/ML
4 INJECTION INTRAMUSCULAR; INTRAVENOUS
Status: COMPLETED | OUTPATIENT
Start: 2019-07-24 | End: 2019-07-24

## 2019-07-24 RX ADMIN — SODIUM CHLORIDE 500 ML: 900 INJECTION, SOLUTION INTRAVENOUS at 17:20

## 2019-07-24 RX ADMIN — ONDANSETRON 4 MG: 2 INJECTION INTRAMUSCULAR; INTRAVENOUS at 17:20

## 2019-07-24 RX ADMIN — KETOROLAC TROMETHAMINE 30 MG: 30 INJECTION, SOLUTION INTRAMUSCULAR at 17:33

## 2019-07-24 NOTE — PROGRESS NOTES
Jimmy Ramirez is a 61 y.o. female who presents today with   Chief Complaint   Patient presents with    Other     Pt presents today to discuss joint surgery with Dr Layne Garcia. 1. Have you been to the ER, urgent care clinic since your last visit? Hospitalized since your last visit? No    2. Have you seen or consulted any other health care providers outside of the [address] [place] [address] since your last visit? Include any pap smears or colon screening.  No

## 2019-07-24 NOTE — PROGRESS NOTES
General Surgery Consult    Amy Amador  Admit date: (Not on file)    MRN: [id number]     : 1960     Age: 61 y.o. Attending Physician: Zaira Sarah MD, [facility]      History of Present Illness:      Amy Amador is a 61 y.o. female who is very familiar to me. I have asked her to come today to discuss the surgery that was going to be scheduled with Dr. Seven Burton. There is an extensive history documenting her recent complaints concerning her abdominal pain and my opinion that her pain is not related to adhesions as well as the high risk of complications from the surgery giving her previous multiple abdominal surgeries and the difficult adhesions in the past.   I had discussed her case with Dr. Seven Burton twice and we both advised the patient that the surgery most likely will not improve her symptoms, neither the lysis of adhesion nor the excision of the left adnexal mass. However the patient is insisting that her pain are related to either the adhesions or the adnexal mass and after multiple discussions with the patient on multiple different occasions as well as discussion with Dr. Seven Burton we both agreed on proceeding with the surgery. Today the discussion was in front of Lauro Delaney, my .     Patient Active Problem List    Diagnosis Date Noted    Bowel obstruction ([address] 2018    Non-intractable cyclical vomiting with nausea 2018    Type 2 diabetes mellitus with nephropathy ([address] 2018    Chronic abdominal pain 2017    Post-operative pain 2017    SBO (small bowel obstruction) ([address] 2017    Osteoarthritis of left knee 2016    Hypertension 2016    Hyperlipidemia 2016    GERD (gastroesophageal reflux disease) 2016    Asthma 2016    Type 2 diabetes mellitus ([address] 2016    Sural neuritis 2014    Chest pain 2014    Essential hypertension, benign 2012    Generalized abdominal pain      Past Medical History:   Diagnosis Date    Abdominal adhesions     Anemia     Asthma     Diabetes mellitus     Dyskinesia     bilateral    Essential hypertension     GERD (gastroesophageal reflux disease)     Hypertension     Menopause     Microhematuria     Rheumatoid arteritis 2018    Stool color black       Past Surgical History:   Procedure Laterality Date    COLONOSCOPY N/A 1/21/2019    COL[facility] performed by Earlene Garvin [facility]    HX CHOLECYSTECTOMY  6/28/10    HX COLONOSCOPY      HX ENDOSCOPY      HX HERNIA REPAIR      HX HYSTERECTOMY      Fibroids    HX KNEE REPLACEMENT Left     HX KNEE REPLACEMENT Right 06/2018    HX OOPHORECTOMY  12/2000    HX ORTHOPAEDIC Right     ankle- multiple surgeries    HX SMALL BOWEL RESECTION  12/2000    HX SMALL BOWEL RESECTION  02/21/2017    Dr. Priya Baker        Social History     Tobacco Use    Smoking status: Never Smoker    Smokeless tobacco: Never Used   Substance Use Topics    Alcohol use: No      Social History     Tobacco Use   Smoking Status Never Smoker   Smokeless Tobacco Never Used     Family History   Problem Relation Age of Onset    Hypertension Other         parent    Breast Cancer Other 21    Heart Disease Father     Diabetes Father     Lung Disease Father     Diabetes Mother     Hypertension Other         sibling    Diabetes Other         parent    Kidney Disease Brother     Diabetes Brother     Lung Disease Brother       Current Outpatient Medications   Medication Sig    cyclobenzaprine (FLEXERIL) 10 mg tablet Take 1 Tab by mouth three (3) times daily as needed for Muscle Spasm(s).  metFORMIN (GLUMETZA ER) 500 mg TG24 24 hour tablet Take 1,000 mg by mouth two (2) times a day. take 1 tablet by mouth twice a day    polyethylene glycol (MIRALAX) 17 gram/dose powder Take 17 g by mouth daily.  1 capful with 8 oz of water daily    bisacodyl (DULCOLAX, BISACODYL,) 5 mg EC tablet Take 1 tablet by mouth twice a day as needed for constipation.  amLODIPine (NORVASC) 5 mg tablet Take 5 mg by mouth daily.  multivitamin (ONE A DAY) tablet Take 1 Tab by mouth daily.  ondansetron hcl (ZOFRAN) 4 mg tablet Take 1 Tab by mouth every eight (8) hours as needed for Nausea.  ALLERGY RELIEF, CETIRIZINE, 10 mg tablet take 1 tablet by mouth once daily    bisoprolol-hydroCHLOROthiazide (ZIAC) 2.5-6.25 mg per tablet Take 1 Tab by mouth daily.  calamine-zinc oxide (CALAMINE) solution aaa tid prn itching    doxepin (SINEQUAN) 10 mg capsule Take 1 Cap by mouth nightly.  omeprazole (PRILOSEC) 10 mg capsule take 1 capsule by mouth once daily    HYDROcodone-acetaminophen (NORCO) 5-325 mg per tablet take 1 tablet by mouth every 4 hours if needed    FARXIGA 10 mg tab tablet take 1 tablet by mouth once daily    amLODIPine (NORVASC) 2.5 mg tablet take 1 tablet by mouth once daily NIGHTLY    albuterol (PROVENTIL HFA, VENTOLIN HFA, PROAIR HFA) 90 mcg/actuation inhaler Take 1 Puff by inhalation every six (6) hours as needed for Wheezing.  folic acid (FOLVITE) 1 mg tablet Take  by mouth daily.  hydroxychloroquine (PLAQUENIL) 200 mg tablet Take 200 mg by mouth daily.  methotrexate (RHEUMATREX) 2.5 mg tablet Take 5 mg by mouth every Tuesday.  sulfaSALAzine (AZULFIDINE) 500 mg tablet Take 500 mg by mouth two (2) times a day.  glucose blood VI test strips (ONETOUCH ULTRA TEST) strip Check blood glucose as directed     No current facility-administered medications for this visit. Allergies   Allergen Reactions    Macrodantin [Nitrofurantoin Macrocrystalline] Itching and Other (comments)    Tape [Adhesive] Other (comments)     Paper tape-- feels like burning skin  Burned skin when had wound vac        Review of Systems:  Pertinent items are noted in the History of Present Illness.     Objective:     Visit Vitals  /84 (BP 1 Location: Left arm, BP Patient Position: At rest)   Pulse 99 Temp 97.7 °F (36.5 °C) (Oral)   Resp 20   Ht 5' 4\" (1.626 m)   Wt 71.2 kg (157 lb)   BMI 26.95 kg/m²       Physical Exam:      General:  in no apparent distress, alert and oriented times 3                   Abdomen:   rounded, soft, nontender, nondistended, no masses or organomegaly. Imaging and Lab Review:     CBC:   Lab Results   Component Value Date/Time    WBC 5.6 07/10/2019 02:56 PM    RBC 5.19 07/10/2019 02:56 PM    HGB 14.4 07/10/2019 02:56 PM    HCT 44.9 07/10/2019 02:56 PM    PLATELET 081 95/82/4306 02:56 PM     BMP:   Lab Results   Component Value Date/Time    Glucose 153 (H) 07/10/2019 02:56 PM    Sodium 134 (L) 07/10/2019 02:56 PM    Potassium 4.7 07/10/2019 02:56 PM    Chloride 100 07/10/2019 02:56 PM    CO2 28 07/10/2019 02:56 PM    BUN 13 07/10/2019 02:56 PM    Creatinine 1.21 07/10/2019 02:56 PM    Calcium 9.3 07/10/2019 02:56 PM     CMP:  Lab Results   Component Value Date/Time    Glucose 153 (H) 07/10/2019 02:56 PM    Sodium 134 (L) 07/10/2019 02:56 PM    Potassium 4.7 07/10/2019 02:56 PM    Chloride 100 07/10/2019 02:56 PM    CO2 28 07/10/2019 02:56 PM    BUN 13 07/10/2019 02:56 PM    Creatinine 1.21 07/10/2019 02:56 PM    Calcium 9.3 07/10/2019 02:56 PM    Anion gap 6 07/10/2019 02:56 PM    BUN/Creatinine ratio 11 (L) 07/10/2019 02:56 PM    Alk. phosphatase 159 (H) 07/10/2019 02:56 PM    Protein, total 7.8 07/10/2019 02:56 PM    Albumin 3.7 07/10/2019 02:56 PM    Globulin 4.1 (H) 07/10/2019 02:56 PM    A-G Ratio 0.9 07/10/2019 02:56 PM       No results found for this or any previous visit (from the past 24 hour(s)). images and reports reviewed    Assessment:   Amy Amador is a 61 y.o. female who is very well-known to me who has been presenting with a long history of abdominal pain. Multiple studies have been done and she has seen Dr. Ramya Monk, her PCP, as well as the GI team and she was seen by Dr. Seven Burton her GYN for which she was investigated for a left adnexal mass. At the beginning I was consulted for the possible need to assist Dr. Joan Joseph with a laparotomy and removal of the adnexal mass. Because of the concern of extensive adhesions and that me and Dr. Joan Joseph we will not sure that her symptoms are related to the adnexal mass or to the adhesions we decided to hold on the surgery. However the patient kept complaining of abdominal pain and after multiple consultation and discussion she is insisting in proceeding with the surgery. After discussing it with Dr. Joan Joseph we decided to proceed with an exploratory laparotomy and extensive lysis of adhesion and Dr. Joan Joseph will do the removal of the adnexal mass. Again I had a very long discussion with the patient today as well as in the last 2 appointment about the high risks of the surgery including the  risk of bowel injury from her extensive adhesions as well as the risk for fistula that can lead to malnutrition and even death. I also discussed the risk of hernia as well as the likelihood that her symptoms may not improved after the surgery. All of this discussion happened with the patient as well as with Dr. Joan Joseph on the phone and with [facility] my  in person. The patient was very clear that she wants to proceed with the surgery.     Plan:     Scheduled for exploratory laparotomy and extensive lysis of adhesion and Dr. Joan Joseph will perform a excision of left adnexal mass  Patient will be admitted to the hospital for few days after the surgery    Please call me if you have any questions (cell phone: [phone])     Signed By: Sandrita Bey MD     July 24, 2019

## 2019-07-24 NOTE — ED PROVIDER NOTES
EMERGENCY DEPARTMENT HISTORY AND PHYSICAL EXAM    Date: 7/24/2019  Patient Name: Edwin Haas    History of Presenting Illness     Chief Complaint   Patient presents with    Abdominal Pain    Vomiting         History Provided By: Patient    Chief Complaint: abdominal pain  Duration: 1 Days  Timing:  Acute  Location: entire abdomen  Quality: Sharp  Severity: Severe  Modifying Factors: none  Associated Symptoms: nausea      HPI: Edwin Haas is a 61 y.o. female with a PMH of Chronic abdominal pain, anemia, asthma, dyskinesia, adhesions, diabetes, hypertension, menopause, GERD, rheumatoid arthritis who presents to the ER for evaluation of her chronic abdominal pain and nausea. Patient states she became ill feeling after celebrating a family member's birthday last night. She was evaluated by Dr. Kevin Garza, general surgery today for an upcoming exploratory surgery next week and decided to come to the emergency department to get evaluated. She denied any associated fevers, chills, shortness of breath, chest pain, palpitations, vomiting, dysuria and has no other symptoms or complaints. PCP: Siddharth Mayers MD    Current Facility-Administered Medications   Medication Dose Route Frequency Provider Last Rate Last Dose    sodium chloride 0.9 % bolus infusion 500 mL  500 mL IntraVENous ONCE Lauren Liu PA-C 500 mL/hr at 07/24/19 1720 500 mL at 07/24/19 1720     Current Outpatient Medications   Medication Sig Dispense Refill    cyclobenzaprine (FLEXERIL) 10 mg tablet Take 1 Tab by mouth three (3) times daily as needed for Muscle Spasm(s). 20 Tab 0    metFORMIN (GLUMETZA ER) 500 mg TG24 24 hour tablet Take 1,000 mg by mouth two (2) times a day. take 1 tablet by mouth twice a day 120 Tab 11    polyethylene glycol (MIRALAX) 17 gram/dose powder Take 17 g by mouth daily.  1 capful with 8 oz of water daily 119 g 0    bisacodyl (DULCOLAX, BISACODYL,) 5 mg EC tablet Take 1 tablet by mouth twice a day as needed for constipation. 20 Tab 0    amLODIPine (NORVASC) 5 mg tablet Take 5 mg by mouth daily.  multivitamin (ONE A DAY) tablet Take 1 Tab by mouth daily.  ondansetron hcl (ZOFRAN) 4 mg tablet Take 1 Tab by mouth every eight (8) hours as needed for Nausea. 12 Tab 0    ALLERGY RELIEF, CETIRIZINE, 10 mg tablet take 1 tablet by mouth once daily 90 Tab 3    bisoprolol-hydroCHLOROthiazide (ZIAC) 2.5-6.25 mg per tablet Take 1 Tab by mouth daily. 90 Tab 3    calamine-zinc oxide (CALAMINE) solution aaa tid prn itching 177 mL 0    doxepin (SINEQUAN) 10 mg capsule Take 1 Cap by mouth nightly. 30 Cap 11    omeprazole (PRILOSEC) 10 mg capsule take 1 capsule by mouth once daily 30 Cap 11    HYDROcodone-acetaminophen (NORCO) 5-325 mg per tablet take 1 tablet by mouth every 4 hours if needed 12 Tab 0    FARXIGA 10 mg tab tablet take 1 tablet by mouth once daily 90 Tab 3    amLODIPine (NORVASC) 2.5 mg tablet take 1 tablet by mouth once daily NIGHTLY 90 Tab 1    albuterol (PROVENTIL HFA, VENTOLIN HFA, PROAIR HFA) 90 mcg/actuation inhaler Take 1 Puff by inhalation every six (6) hours as needed for Wheezing. 1 Inhaler 0    folic acid (FOLVITE) 1 mg tablet Take  by mouth daily.  hydroxychloroquine (PLAQUENIL) 200 mg tablet Take 200 mg by mouth daily.  methotrexate (RHEUMATREX) 2.5 mg tablet Take 5 mg by mouth every Tuesday.  sulfaSALAzine (AZULFIDINE) 500 mg tablet Take 500 mg by mouth two (2) times a day.       glucose blood VI test strips (ONETOUCH ULTRA TEST) strip Check blood glucose as directed 100 Strip 1       Past History     Past Medical History:  Past Medical History:   Diagnosis Date    Abdominal adhesions     Anemia     Asthma     Diabetes mellitus     Dyskinesia     bilateral    Essential hypertension     GERD (gastroesophageal reflux disease)     Hypertension     Menopause     Microhematuria     Rheumatoid arteritis 2018    Stool color black        Past Surgical History:  Past Surgical History:   Procedure Laterality Date    COLONOSCOPY N/A 1/21/2019    COL[facility]COPY performed by Jake [facility]vin MD at [facility]COPY    HX CHOLECYSTECTOMY  6/28/10    HX COLONOSCOPY      HX ENDOSCOPY      HX HERNIA REPAIR      HX HYSTERECTOMY      Fibroids    HX KNEE REPLACEMENT Left     HX KNEE REPLACEMENT Right 06/2018    HX OOPHORECTOMY  12/2000    HX ORTHOPAEDIC Right     ankle- multiple surgeries    HX SMALL BOWEL RESECTION  12/2000    HX SMALL BOWEL RESECTION  02/21/2017    Dr. Lelo Lovelace         Family History:  Family History   Problem Relation Age of Onset    Hypertension Other         parent    Breast Cancer Other 21    Heart Disease Father     Diabetes Father     Lung Disease Father     Diabetes Mother     Hypertension Other         sibling    Diabetes Other         parent    Kidney Disease Brother     Diabetes Brother     Lung Disease Brother        Social History:  Social History     Tobacco Use    Smoking status: Never Smoker    Smokeless tobacco: Never Used   Substance Use Topics    Alcohol use: No    Drug use: No       Allergies: Allergies   Allergen Reactions    Macrodantin [Nitrofurantoin Macrocrystalline] Itching and Other (comments)    Tape [Adhesive] Other (comments)     Paper tape-- feels like burning skin  Burned skin when had wound vac         Review of Systems   Review of Systems   Constitutional: Negative for chills, fatigue and fever. HENT: Negative. Negative for sore throat. Eyes: Negative. Respiratory: Negative for cough and shortness of breath. Cardiovascular: Negative for chest pain and palpitations. Gastrointestinal: Positive for abdominal pain and nausea. Negative for vomiting. Genitourinary: Negative for dysuria. Musculoskeletal: Negative. Skin: Negative. Neurological: Negative for dizziness, weakness, light-headedness and headaches. Psychiatric/Behavioral: Negative.     All other systems reviewed and are negative. Physical Exam     Vitals:    07/24/19 1602   BP: 141/89   Pulse: (!) 103   Resp: 15   Temp: 97.5 °F (36.4 °C)   SpO2: 100%   Weight: 71.2 kg (157 lb)   Height: 5' 4\" (1.626 m)     Physical Exam   Constitutional: She is oriented to person, place, and time. She appears well-developed and well-nourished. No distress. HENT:   Head: Normocephalic and atraumatic. Mouth/Throat: Oropharynx is clear and moist.   Eyes: Conjunctivae are normal. No scleral icterus. Neck: Neck supple. No JVD present. No tracheal deviation present. Cardiovascular: Normal rate, regular rhythm and normal heart sounds. No murmur heard. Pulmonary/Chest: Effort normal and breath sounds normal. No respiratory distress. She has no wheezes. She has no rales. Abdominal: Soft. Bowel sounds are normal. She exhibits no distension and no mass. There is tenderness. There is no rebound and no guarding. generalized abd pain on exam; no peritoneal signs   Musculoskeletal: Normal range of motion. Neurological: She is alert and oriented to person, place, and time. She has normal strength. Gait normal. GCS eye subscore is 4. GCS verbal subscore is 5. GCS motor subscore is 6. Skin: Skin is warm and dry. She is not diaphoretic. Psychiatric: She has a normal mood and affect. Nursing note and vitals reviewed.         Diagnostic Study Results     Labs -     Recent Results (from the past 12 hour(s))   METABOLIC PANEL, COMPREHENSIVE    Collection Time: 07/24/19  4:31 PM   Result Value Ref Range    Sodium 141 136 - 145 mmol/L    Potassium 3.5 3.5 - 5.5 mmol/L    Chloride 107 100 - 111 mmol/L    CO2 27 21 - 32 mmol/L    Anion gap 7 3.0 - 18 mmol/L    Glucose 134 (H) 74 - 99 mg/dL    BUN 15 7.0 - 18 MG/DL    Creatinine 1.01 0.6 - 1.3 MG/DL    BUN/Creatinine ratio 15 12 - 20      GFR est AA >60 >60 ml/min/1.73m2    GFR est non-AA 56 (L) >60 ml/min/1.73m2    Calcium 9.5 8.5 - 10.1 MG/DL    Bilirubin, total 0.3 0.2 - 1.0 MG/DL    ALT (SGPT) 21 13 - 56 U/L    AST (SGOT) 13 10 - 38 U/L    Alk. phosphatase 149 (H) 45 - 117 U/L    Protein, total 7.3 6.4 - 8.2 g/dL    Albumin 3.6 3.4 - 5.0 g/dL    Globulin 3.7 2.0 - 4.0 g/dL    A-G Ratio 1.0 0.8 - 1.7     LIPASE    Collection Time: 07/24/19  4:31 PM   Result Value Ref Range    Lipase 206 73 - 393 U/L   CBC WITH AUTOMATED DIFF    Collection Time: 07/24/19  4:31 PM   Result Value Ref Range    WBC 6.8 4.6 - 13.2 K/uL    RBC 4.88 4.20 - 5.30 M/uL    HGB 13.5 12.0 - 16.0 g/dL    HCT 41.5 35.0 - 45.0 %    MCV 85.0 74.0 - 97.0 FL    MCH 27.7 24.0 - 34.0 PG    MCHC 32.5 31.0 - 37.0 g/dL    RDW 17.0 (H) 11.6 - 14.5 %    PLATELET 663 738 - 540 K/uL    MPV 9.9 9.2 - 11.8 FL    NEUTROPHILS 65 40 - 73 %    LYMPHOCYTES 23 21 - 52 %    MONOCYTES 10 3 - 10 %    EOSINOPHILS 2 0 - 5 %    BASOPHILS 0 0 - 2 %    ABS. NEUTROPHILS 4.5 1.8 - 8.0 K/UL    ABS. LYMPHOCYTES 1.6 0.9 - 3.6 K/UL    ABS. MONOCYTES 0.7 0.05 - 1.2 K/UL    ABS. EOSINOPHILS 0.1 0.0 - 0.4 K/UL    ABS. BASOPHILS 0.0 0.0 - 0.1 K/UL    DF AUTOMATED         Radiologic Studies -   No orders to display     CT Results  (Last 48 hours)    None        CXR Results  (Last 48 hours)    None            Medical Decision Making   I am the first provider for this patient. I reviewed the vital signs, available nursing notes, past medical history, past surgical history, family history and social history. Vital Signs-Reviewed the patient's vital signs. Records Reviewed: Nursing Notes, Old Medical Records, Previous Radiology Studies and Previous Laboratory Studies     1118 PM  70-year-old female with history of chronic abdominal pain presents to the ER complaining of abdominal pain and nausea. Patient states she celebrated family members birthday last night has been ill feeling since then. Was seen by Dr. Ramila Lawrence, general surgery today to discuss upcoming surgery next week and came by the ER for further evaluation.   Vital signs noted to be stable. All labs reviewed and noted to be unremarkable. We will plan on nausea medication and fluid bolus at this time. No clinical indication for further imaging or testing. Advised patient to continue follow-up with primary care and general surgery. Patient stable for discharge after medication and fluid administration. All questions answered and patient in agreement with plan of care. Will plan for discharge. Sukhdev Parsons PA-C       Disposition:  discharged    DISCHARGE NOTE:       Care plan outlined and precautions discussed. Patient has no new complaints, changes, or physical findings. Results of labs were reviewed with the patient. All medications were reviewed with the patient; will d/c home with n/a. All of pt's questions and concerns were addressed. Patient was instructed and agrees to follow up with pcp and gen surgery, as well as to return to the ED upon further deterioration. Patient is ready to go home. Follow-up Information     Follow up With Specialties Details Why [address] [facility] EMERGENCY DEPT Emergency Medicine  If symptoms worsen [address] [facility]  [phone]    David Allison MD Family Practice, Internal Medicine Call in 1 day As needed for ER follow up for pain and nausea [address] [facility]  [address] [facility]  [phone]            Current Discharge Medication List      CONTINUE these medications which have NOT CHANGED    Details   cyclobenzaprine (FLEXERIL) 10 mg tablet Take 1 Tab by mouth three (3) times daily as needed for Muscle Spasm(s). Qty: 20 Tab, Refills: 0    Associated Diagnoses: Primary osteoarthritis of left knee      metFORMIN (GLUMETZA ER) 500 mg TG24 24 hour tablet Take 1,000 mg by mouth two (2) times a day.  take 1 tablet by mouth twice a day  Qty: 120 Tab, Refills: 11    Associated Diagnoses: Steroid-induced diabetes (HCC)      polyethylene glycol (MIRALAX) 17 gram/dose powder Take 17 g by mouth daily. 1 capful with 8 oz of water daily  Qty: 119 g, Refills: 0      bisacodyl (DULCOLAX, BISACODYL,) 5 mg EC tablet Take 1 tablet by mouth twice a day as needed for constipation. Qty: 20 Tab, Refills: 0      !! amLODIPine (NORVASC) 5 mg tablet Take 5 mg by mouth daily. multivitamin (ONE A DAY) tablet Take 1 Tab by mouth daily. ondansetron hcl (ZOFRAN) 4 mg tablet Take 1 Tab by mouth every eight (8) hours as needed for Nausea. Qty: 12 Tab, Refills: 0      ALLERGY RELIEF, CETIRIZINE, 10 mg tablet take 1 tablet by mouth once daily  Qty: 90 Tab, Refills: 3    Associated Diagnoses: Dermatitis      bisoprolol-hydroCHLOROthiazide (ZIAC) 2.5-6.25 mg per tablet Take 1 Tab by mouth daily. Qty: 90 Tab, Refills: 3    Associated Diagnoses: Essential hypertension      calamine-zinc oxide (CALAMINE) solution aaa tid prn itching  Qty: 177 mL, Refills: 0      doxepin (SINEQUAN) 10 mg capsule Take 1 Cap by mouth nightly. Qty: 30 Cap, Refills: 11    Associated Diagnoses: Insomnia secondary to chronic pain      omeprazole (PRILOSEC) 10 mg capsule take 1 capsule by mouth once daily  Qty: 30 Cap, Refills: 11    Associated Diagnoses: Gastroesophageal reflux disease without esophagitis      HYDROcodone-acetaminophen (NORCO) 5-325 mg per tablet take 1 tablet by mouth every 4 hours if needed  Qty: 12 Tab, Refills: 0    Associated Diagnoses: Rheumatoid arthritis involving both hands with positive rheumatoid factor (HCC)      FARXIGA 10 mg tab tablet take 1 tablet by mouth once daily  Qty: 90 Tab, Refills: 3    Associated Diagnoses: Type 2 diabetes mellitus with hyperglycemia, without long-term current use of insulin ([address]      ! ! amLODIPine (NORVASC) 2.5 mg tablet take 1 tablet by mouth once daily NIGHTLY  Qty: 90 Tab, Refills: 1      albuterol (PROVENTIL HFA, VENTOLIN HFA, PROAIR HFA) 90 mcg/actuation inhaler Take 1 Puff by inhalation every six (6) hours as needed for Wheezing.   Qty: 1 Inhaler, Refills: 0      folic acid (FOLVITE) 1 mg tablet Take  by mouth daily. hydroxychloroquine (PLAQUENIL) 200 mg tablet Take 200 mg by mouth daily. methotrexate (RHEUMATREX) 2.5 mg tablet Take 5 mg by mouth every Tuesday. sulfaSALAzine (AZULFIDINE) 500 mg tablet Take 500 mg by mouth two (2) times a day. glucose blood VI test strips (ONETOUCH ULTRA TEST) strip Check blood glucose as directed  Qty: 100 Strip, Refills: 1    Associated Diagnoses: Type 2 diabetes mellitus with hyperglycemia, without long-term current use of insulin ([facility]       ! ! - Potential duplicate medications found. Please discuss with provider. Provider Notes (Medical Decision Making):     Procedures:  Procedures        Diagnosis     Clinical Impression:   1. Abdominal pain, chronic, generalized    2.  Nausea without vomiting

## 2019-07-24 NOTE — ED NOTES
I performed a brief history of the patient here in triage and I have determined that pt will need further treatment and evaluation from the main side ER physician. I have placed initial orders based on the history to help in expediting patients care.         Visit Vitals  /89 (BP 1 Location: Left arm, BP Patient Position: At rest)   Pulse (!) 103   Temp 97.5 °F (36.4 °C)   Resp 15   Ht 5' 4\" (1.626 m)   Wt 71.2 kg (157 lb)   SpO2 100%   BMI 26.95 kg/m²     GUILLERMO Cherry  07/24/19

## 2019-07-24 NOTE — PATIENT INSTRUCTIONS
If you have any questions or concerns about today's appointment, the verbal and/or written instructions you were given for follow up care, please call our office at [phone]. [facility] [place]  [address] [facility] Esdras Jain [address] [place], [place] [address] [facility]    [phone] office  [phone] fax      PATIENT PRE AND POST [address]   [address] [facility]  [phone]    Before Surgery Instructions:   1) You must have someone available to drive you to and from your procedure and stay with you for the first 24 hours. 2) It is very important that you have nothing to eat or drink after midnight the night before your surgery. This includes chewing gum or sucking on hard candy. Take only heart, blood pressure and cholesterol medications the morning of surgery with only a sip of water. 3) Please stop taking Plavix 10 days prior to your surgery. Stop taking Coumadin 5 days prior to your surgery. Stop taking all Aspirin or Aspirin containing products 7 days prior to your surgery. Stop taking Advil, Motrin, Aleve, and etc. 3 days prior to your surgery. 4) If you take any diabetic medications please consult with your primary care physician on how to take them on the day of your surgery  5) Please stop all Herbal products 2 weeks prior to your surgery. 6) Please arrive at the hospital 2 hours prior to your surgery, unless you have been otherwise instructed. Any required labs/urine drug screen/x-rays will be performed on day of surgery. 7) If you are of child bearing age you will have pregnancy test done the morning of your surgery as soon as you arrive. 8) Patients having an operation on their colon will be given a separate instruction sheet on their Bowel Prep. 9) For any pre-operative work up check in at the main entrance to [facility], and then go to Patient Registration.  These studies are done on a walk in basis they are open from 7:00am to 5:00pm Monday through Friday. 10) Please wash your surgical site the morning of your surgery with antibacterial (i.e. Dial) soap and water. 11) You will be contacted by a hospital preadmission nurse approximately one week prior to scheduled surgery to discuss additional instructions and to review your medications. 12) You may be contacted to change your surgery time. At times this is necessary due to equipment, staffing needs or in the event of a cancellation. Surgery Date and Time:  Thursday, August 1, 2019 at 1:00pm    Please check in at [facility], enter through the Emergency Room entrance and go up to the second floor. Please check in by 11:00am the day of your surgery. After Surgery Instructions: You will need to be seen in the office for a follow-up visit 7-14 days after your surgery. Please call after you have had the procedure to make this appointment. Unless otherwise instructed, you may remove your outer bandage and shower 48 hours after your surgery. If you develop a fever greater than 101, have any significant drainage, bleeding, swelling and/or pus of the wound. Please call our office immediately. You may contact Josh Kenyon with any questions at [phone].

## 2019-07-24 NOTE — DISCHARGE INSTRUCTIONS
Patient Education        Chronic Pain: Care Instructions  Your Care Instructions    Chronic pain is pain that lasts a long time (months or even years) and may or may not have a clear cause. It is different from acute pain, which usually does have a clear cause--like an injury or illness--and gets better over time. Chronic pain:  · Lasts over time but may vary from day to day. · Does not go away despite efforts to end it. · May disrupt your sleep and lead to fatigue. · May cause depression or anxiety. · May make your muscles tense, causing more pain. · Can disrupt your work, hobbies, home life, and relationships with friends and family. Chronic pain is a very real condition. It is not just in your head. Treatment can help and usually includes several methods used together, such as medicines, physical therapy, exercise, and other treatments. Learning how to relax and changing negative thought patterns can also help you cope. Chronic pain is complex. Taking an active role in your treatment will help you better manage your pain. Tell your doctor if you have trouble dealing with your pain. You may have to try several things before you find what works best for you. Follow-up care is a key part of your treatment and safety. Be sure to make and go to all appointments, and call your doctor if you are having problems. It's also a good idea to know your test results and keep a list of the medicines you take. How can you care for yourself at home? · Pace yourself. Break up large jobs into smaller tasks. Save harder tasks for days when you have less pain, or go back and forth between hard tasks and easier ones. Take rest breaks. · Relax, and reduce stress. Relaxation techniques such as deep breathing or meditation can help. · Keep moving. Gentle, daily exercise can help reduce pain over the long run. Try low- or no-impact exercises such as walking, swimming, and stationary biking.  Do stretches to stay flexible. · Try heat, cold packs, and massage. · Get enough sleep. Chronic pain can make you tired and drain your energy. Talk with your doctor if you have trouble sleeping because of pain. · Think positive. Your thoughts can affect your pain level. Do things that you enjoy to distract yourself when you have pain instead of focusing on the pain. See a movie, read a book, listen to music, or spend time with a friend. · If you think you are depressed, talk to your doctor about treatment. · Keep a daily pain diary. Record how your moods, thoughts, sleep patterns, activities, and medicine affect your pain. You may find that your pain is worse during or after certain activities or when you are feeling a certain emotion. Having a record of your pain can help you and your doctor find the best ways to treat your pain. · Take pain medicines exactly as directed. ? If the doctor gave you a prescription medicine for pain, take it as prescribed. ? If you are not taking a prescription pain medicine, ask your doctor if you can take an over-the-counter medicine. Reducing constipation caused by pain medicine  · Include fruits, vegetables, beans, and whole grains in your diet each day. These foods are high in fiber. · Drink plenty of fluids, enough so that your urine is light yellow or clear like water. If you have kidney, heart, or liver disease and have to limit fluids, talk with your doctor before you increase the amount of fluids you drink. · If your doctor recommends it, get more exercise. Walking is a good choice. Bit by bit, increase the amount you walk every day. Try for at least 30 minutes on most days of the week. · Schedule time each day for a bowel movement. A daily routine may help. Take your time and do not strain when having a bowel movement. When should you call for help?   Call your doctor now or seek immediate medical care if:    · Your pain gets worse or is out of control.     · You feel down or blue, or you do not enjoy things like you once did. You may be depressed, which is common in people with chronic pain. Depression can be treated.     · You have vomiting or cramps for more than 2 hours.    Watch closely for changes in your health, and be sure to contact your doctor if:    · You cannot sleep because of pain.     · You are very worried or anxious about your pain.     · You have trouble taking your pain medicine.     · You have any concerns about your pain medicine.     · You have trouble with bowel movements, such as:  ? No bowel movement in 3 days. ? Blood in the anal area, in your stool, or on the toilet paper. ? Diarrhea for more than 24 hours. Where can you learn more? Go to http://betzy-didi.info/. Enter N004 in the search box to learn more about \"Chronic Pain: Care Instructions. \"  Current as of: March 28, 2019  Content Version: 12.1  © 0600-0275 [employer]. Care instructions adapted under license by [employer] (which disclaims liability or warranty for this information). If you have questions about a medical condition or this instruction, always ask your healthcare professional. [place] [address] any warranty or liability for your use of this information.

## 2019-07-30 ENCOUNTER — OFFICE VISIT (OUTPATIENT)
Dept: OBGYN CLINIC | Age: 59
End: 2019-07-30

## 2019-07-30 VITALS
SYSTOLIC BLOOD PRESSURE: 116 MMHG | RESPIRATION RATE: 18 BRPM | WEIGHT: 156 LBS | TEMPERATURE: 98.4 F | DIASTOLIC BLOOD PRESSURE: 73 MMHG | BODY MASS INDEX: 26.63 KG/M2 | HEART RATE: 82 BPM | HEIGHT: 64 IN

## 2019-07-30 DIAGNOSIS — N94.9 ADNEXAL CYST: ICD-10-CM

## 2019-07-30 PROBLEM — G89.29 CHRONIC PELVIC PAIN IN FEMALE: Status: ACTIVE | Noted: 2019-07-30

## 2019-07-30 PROBLEM — R10.2 CHRONIC PELVIC PAIN IN FEMALE: Status: ACTIVE | Noted: 2019-07-30

## 2019-07-30 NOTE — PROGRESS NOTES
Preoperative Evaluation    Date of Exam: 2019    Lila Rivera is a 61 y.o. female (:1960) who presents for preoperative evaluation. Procedure/Surgery: Exploratory laparotomy with left adnexal mass removal  Date of Procedure/Surgery: 2019  Surgeon: Albert Chaidez; Co-surgeon: Jeovanny Alexandra  Hospital/Surgical Facility: [facility]  Primary Physician: Stanislav Pink MD  Latex Allergy: no    Problem List:     Patient Active Problem List    Diagnosis Date Noted    Adnexal cyst 2019    Chronic pelvic pain in female 2019    Bowel obstruction ([address] 2018    Non-intractable cyclical vomiting with nausea 2018    Type 2 diabetes mellitus with nephropathy ([address] 2018    Chronic abdominal pain 2017    Post-operative pain 2017    SBO (small bowel obstruction) ([address] 2017    Osteoarthritis of left knee 2016    Hypertension 2016    Hyperlipidemia 2016    GERD (gastroesophageal reflux disease) 2016    Asthma 2016    Type 2 diabetes mellitus ([address] 2016    Sural neuritis 2014    Chest pain 2014    Essential hypertension, benign 2012    Generalized abdominal pain      Medical History:     Past Medical History:   Diagnosis Date    Abdominal adhesions     Adnexal cyst 2019    Left    Anemia     Asthma     Chronic pelvic pain in female 2019    Diabetes mellitus     Dyskinesia     bilateral    Essential hypertension     GERD (gastroesophageal reflux disease)     Hypertension     Menopause     Microhematuria     Rheumatoid arteritis 2018    Stool color black      Allergies:      Allergies   Allergen Reactions    Macrodantin [Nitrofurantoin Macrocrystalline] Itching and Other (comments)    Tape [Adhesive] Other (comments)     Paper tape-- feels like burning skin  Burned skin when had wound vac      Medications:     Current Outpatient Medications Medication Sig    cyclobenzaprine (FLEXERIL) 10 mg tablet Take 1 Tab by mouth three (3) times daily as needed for Muscle Spasm(s).  metFORMIN (GLUMETZA ER) 500 mg TG24 24 hour tablet Take 1,000 mg by mouth two (2) times a day. take 1 tablet by mouth twice a day    multivitamin (ONE A DAY) tablet Take 1 Tab by mouth daily.  ondansetron hcl (ZOFRAN) 4 mg tablet Take 1 Tab by mouth every eight (8) hours as needed for Nausea.  ALLERGY RELIEF, CETIRIZINE, 10 mg tablet take 1 tablet by mouth once daily    bisoprolol-hydroCHLOROthiazide (ZIAC) 2.5-6.25 mg per tablet Take 1 Tab by mouth daily.  doxepin (SINEQUAN) 10 mg capsule Take 1 Cap by mouth nightly.  omeprazole (PRILOSEC) 10 mg capsule take 1 capsule by mouth once daily    FARXIGA 10 mg tab tablet take 1 tablet by mouth once daily    amLODIPine (NORVASC) 2.5 mg tablet take 1 tablet by mouth once daily NIGHTLY    albuterol (PROVENTIL HFA, VENTOLIN HFA, PROAIR HFA) 90 mcg/actuation inhaler Take 1 Puff by inhalation every six (6) hours as needed for Wheezing.  folic acid (FOLVITE) 1 mg tablet Take  by mouth daily.  methotrexate (RHEUMATREX) 2.5 mg tablet Take 7.5 mg by mouth every Tuesday.  sulfaSALAzine (AZULFIDINE) 500 mg tablet Take 500 mg by mouth two (2) times a day.  glucose blood VI test strips (ONETOUCH ULTRA TEST) strip Check blood glucose as directed     No current facility-administered medications for this visit.       Surgical History:     Past Surgical History:   Procedure Laterality Date    COLONOSCOPY N/A 1/21/2019    COLON[facility]ivar Garvin [facility] at [facility] ENDOSCOPY    HX CHOLECYSTECTOMY  6/28/10    HX COLONOSCOPY      HX ENDOSCOPY      HX HERNIA REPAIR      HX HYSTERECTOMY  1989    Fibroids    HX KNEE REPLACEMENT Left     HX KNEE REPLACEMENT Right 06/2018    HX OOPHORECTOMY  12/2000    HX ORTHOPAEDIC Right     ankle- multiple surgeries    HX SMALL BOWEL RESECTION  12/2000    HX SMALL BOWEL RESECTION  02/21/2017    Dr. Mil Hawk       Social History:     Social History     Socioeconomic History    Marital status:      Spouse name: Not on file    Number of children: Not on file    Years of education: Not on file    Highest education level: Not on file   Tobacco Use    Smoking status: Never Smoker    Smokeless tobacco: Never Used   Substance and Sexual Activity    Alcohol use: No    Drug use: No    Sexual activity: Yes     Partners: Male     Birth control/protection: None       Recent use of: No recent use of aspirin (ASA), NSAIDS or steroids    Tetanus up to date: tetanus status unknown to the patient      Anesthesia Complications: None  History of abnormal bleeding : None  History of Blood Transfusions: no  Health Care Directive or Living Will: no    REVIEW OF SYSTEMS:  A comprehensive review of systems was negative except for that written in the HPI. EXAM:   Visit Vitals  /73   Pulse 82   Temp 98.4 °F (36.9 °C) (Oral)   Resp 18   Ht 5' 4\" (1.626 m)   Wt 156 lb (70.8 kg)   BMI 26.78 kg/m²     General appearance - alert, well appearing, and in no distress  Mental status - alert, oriented to person, place, and time      DIAGNOSTICS:   1. EKG: EKG FINDINGS - normal EKG, normal sinus rhythm  2.  CXR: N/A  3. Labs:   Lab Results   Component Value Date/Time    WBC 6.8 07/24/2019 04:31 PM    HGB 13.5 07/24/2019 04:31 PM    Hemoglobin, POC 14.6 02/21/2017 01:32 PM    HCT 41.5 07/24/2019 04:31 PM    Hematocrit, POC 43 02/21/2017 01:32 PM    PLATELET 035 06/08/4784 04:31 PM    MCV 85.0 07/24/2019 04:31 PM     Lab Results   Component Value Date/Time    Sodium 141 07/24/2019 04:31 PM    Potassium 3.5 07/24/2019 04:31 PM    Chloride 107 07/24/2019 04:31 PM    CO2 27 07/24/2019 04:31 PM    Anion gap 7 07/24/2019 04:31 PM    Glucose 134 (H) 07/24/2019 04:31 PM    BUN 15 07/24/2019 04:31 PM    Creatinine 1.01 07/24/2019 04:31 PM    BUN/Creatinine ratio 15 07/24/2019 04:31 PM    GFR est AA >60 07/24/2019 04:31 PM    GFR est non-AA 56 (L) 07/24/2019 04:31 PM    Calcium 9.5 07/24/2019 04:31 PM    Bilirubin, total 0.3 07/24/2019 04:31 PM    ALT (SGPT) 21 07/24/2019 04:31 PM    AST (SGOT) 13 07/24/2019 04:31 PM    Alk. phosphatase 149 (H) 07/24/2019 04:31 PM    Protein, total 7.3 07/24/2019 04:31 PM    Albumin 3.6 07/24/2019 04:31 PM    Globulin 3.7 07/24/2019 04:31 PM    A-G Ratio 1.0 07/24/2019 04:31 PM      Lab Results   Component Value Date/Time    Hemoglobin A1c 8.3 (H) 07/17/2019 03:25 PM        IMPRESSION:   Chronic pelvic pain- pt adamant that surgery is necessary. Long discussion about risks, benefits, and alternatives of surgery. Specifically, we discussed risks of bleeding, infection, damage to surrounding structures. Pt. Has been cleared by Dr. Nabil Nichols. Noted Hemoglobin A1-C. Will plan for co-surgery with Dr. Daryl Louis. Pre and post procedure instructions reviewed. Agnieszka Aaron MD   7/30/2019    Addendum: H&P reviewed. No updates. She is NPO and has taken her GERD medication. Random BS is 139. She has given informed consent and has no questions regarding the upcoming surgery. she is aware of the risks for major complications and even death. The plan of care has been discussed with the patient. She has been given the opportunity to ask questions, which seem to have been answered satisfactorily.

## 2019-07-30 NOTE — PERIOP NOTES
PAT - SURGICAL PRE-ADMISSION INSTRUCTIONS    NAME:  Aditi Jensen                                                          TODAY'S DATE:  7/30/2019    SURGERY DATE:  8/1/2019                                  SURGERY ARRIVAL TIME:   1100 PER OFFICE, TBV    1. Do NOT eat or drink anything, including candy or gum, after MIDNIGHT on 7/31/19 , unless you have specific instructions from your Surgeon or Anesthesia Provider to do so. 2. No smoking on the day of surgery. 3. No alcohol 24 hours prior to the day of surgery. 4. No recreational drugs for one week prior to the day of surgery. 5. Leave all valuables, including money/purse, at home. 6. Remove all jewelry, nail polish, makeup (including mascara); no lotions, powders, deodorant, or perfume/cologne/after shave. 7. Glasses/Contact lenses and Dentures may be worn to the hospital.  They will be removed prior to surgery. 8. Call your doctor if symptoms of a cold or illness develop within 24 ours prior to surgery. 9. AN ADULT MUST DRIVE YOU HOME AFTER OUTPATIENT SURGERY. 10. If you are having an OUTPATIENT procedure, please make arrangements for a responsible adult to be with you for 24 hours after your surgery. 11. If you are admitted to the hospital, you will be assigned to a bed after surgery is complete. Normally a family member will not be able to see you until you are in your assigned bed. 15. Family is encouraged to accompany you to the hospital.  We ask visitors in the treatment area to be limited to ONE person at a time to ensure patient privacy. EXCEPTIONS WILL BE MADE AS NEEDED. 15. Children under 12 are discouraged from entering the treatment area and need to be supervised by an adult when in the waiting room. Special Instructions:    Bring inhaler. , Take these medications the morning of surgery with a sip of water:  OMEPRAZOLE (PRILOSEC), HOLD oral diabetic medication on the MORNING OF surgery. , HOLD metformin/glucophage dose starting the [address] [place] [address] the day of surgery. Patient Prep:    use CHG solution    These surgical instructions were reviewed with PATIENT during the PAT VISIT. A printed copy of the instructions was provided to PATIENT. Directions: On the morning of surgery, please go to the [address] [place] [address]. Enter the building from the [facility] entrance, 1st floor (next to the Emergency Room entrance). Take the elevator to the 2nd floor. Sign in at the Registration Desk.     If you have any questions and/or concerns, please do not hesitate to call:  (Prior to the day of surgery)  PAS unit:  [phone]  (Day of surgery)  [address] unit:  [phone]

## 2019-07-31 ENCOUNTER — ANESTHESIA EVENT (OUTPATIENT)
Dept: SURGERY | Age: 59
DRG: 330 | End: 2019-07-31
Payer: COMMERCIAL

## 2019-08-01 ENCOUNTER — HOSPITAL ENCOUNTER (INPATIENT)
Age: 59
LOS: 8 days | Discharge: HOME | DRG: 330 | End: 2019-08-09
Attending: SURGERY | Admitting: SURGERY
Payer: COMMERCIAL

## 2019-08-01 ENCOUNTER — ANESTHESIA (OUTPATIENT)
Dept: SURGERY | Age: 59
DRG: 330 | End: 2019-08-01
Payer: COMMERCIAL

## 2019-08-01 DIAGNOSIS — Z90.49 S/P SMALL BOWEL RESECTION: ICD-10-CM

## 2019-08-01 LAB
GLUCOSE BLD STRIP.AUTO-MCNC: 130 MG/DL (ref 70–110)
GLUCOSE BLD STRIP.AUTO-MCNC: 193 MG/DL (ref 70–110)

## 2019-08-01 PROCEDURE — 77030002966 HC SUT PDS J&J -A: Performed by: SURGERY

## 2019-08-01 PROCEDURE — 77030013079 HC BLNKT BAIR HGGR 3M -A: Performed by: ANESTHESIOLOGY

## 2019-08-01 PROCEDURE — 77030008477 HC STYL SATN SLP COVD -A: Performed by: ANESTHESIOLOGY

## 2019-08-01 PROCEDURE — 0DN80ZZ RELEASE SMALL INTESTINE, OPEN APPROACH: ICD-10-PCS | Performed by: SURGERY

## 2019-08-01 PROCEDURE — 77030002974 HC SUT PLN J&J -A: Performed by: SURGERY

## 2019-08-01 PROCEDURE — 74011000272 HC RX REV CODE- 272: Performed by: SURGERY

## 2019-08-01 PROCEDURE — 77030011278 HC ELECTRD LIG IMPT COVD -F: Performed by: SURGERY

## 2019-08-01 PROCEDURE — 0DB80ZZ EXCISION OF SMALL INTESTINE, OPEN APPROACH: ICD-10-PCS | Performed by: SURGERY

## 2019-08-01 PROCEDURE — 77030011267 HC ELECTRD BLD COVD -A: Performed by: SURGERY

## 2019-08-01 PROCEDURE — 82962 GLUCOSE BLOOD TEST: CPT

## 2019-08-01 PROCEDURE — 74011250636 HC RX REV CODE- 250/636: Performed by: SURGERY

## 2019-08-01 PROCEDURE — 76010000153 HC OR TIME 1.5 TO 2 HR: Performed by: SURGERY

## 2019-08-01 PROCEDURE — 77030033827 HC SLV COMPR SCD THGH COVD -B: Performed by: SURGERY

## 2019-08-01 PROCEDURE — 77030036731 HC STPLR ENDOSC J&J -F: Performed by: SURGERY

## 2019-08-01 PROCEDURE — 77030008462 HC STPLR SKN PROX J&J -A: Performed by: SURGERY

## 2019-08-01 PROCEDURE — 77030009979 HC RELD STPLR TCR J&J -C: Performed by: SURGERY

## 2019-08-01 PROCEDURE — 74011250636 HC RX REV CODE- 250/636: Performed by: NURSE ANESTHETIST, CERTIFIED REGISTERED

## 2019-08-01 PROCEDURE — 88307 TISSUE EXAM BY PATHOLOGIST: CPT

## 2019-08-01 PROCEDURE — 76210000001 HC OR PH I REC 2.5 TO 3 HR: Performed by: SURGERY

## 2019-08-01 PROCEDURE — 74011250636 HC RX REV CODE- 250/636

## 2019-08-01 PROCEDURE — 77030032490 HC SLV COMPR SCD KNE COVD -B: Performed by: SURGERY

## 2019-08-01 PROCEDURE — 77030034850: Performed by: SURGERY

## 2019-08-01 PROCEDURE — 77030003029 HC SUT VCRL J&J -B: Performed by: SURGERY

## 2019-08-01 PROCEDURE — 74011000250 HC RX REV CODE- 250

## 2019-08-01 PROCEDURE — 77030018846 HC SOL IRR STRL H20 ICUM -A: Performed by: SURGERY

## 2019-08-01 PROCEDURE — 0DQV0ZZ REPAIR MESENTERY, OPEN APPROACH: ICD-10-PCS | Performed by: SURGERY

## 2019-08-01 PROCEDURE — 76060000034 HC ANESTHESIA 1.5 TO 2 HR: Performed by: SURGERY

## 2019-08-01 PROCEDURE — 65270000029 HC RM PRIVATE

## 2019-08-01 PROCEDURE — 74011250637 HC RX REV CODE- 250/637: Performed by: NURSE ANESTHETIST, CERTIFIED REGISTERED

## 2019-08-01 PROCEDURE — 77030003028 HC SUT VCRL J&J -A: Performed by: SURGERY

## 2019-08-01 PROCEDURE — 77030018842 HC SOL IRR SOD CL 9% BAXT -A: Performed by: SURGERY

## 2019-08-01 PROCEDURE — 77030018723 HC ELCTRD BLD COVD -A: Performed by: SURGERY

## 2019-08-01 PROCEDURE — 77030008683 HC TU ET CUF COVD -A: Performed by: ANESTHESIOLOGY

## 2019-08-01 PROCEDURE — 77030033639 HC SHR ENDO COAG HARM 36 J&J -E: Performed by: SURGERY

## 2019-08-01 PROCEDURE — 77030011265 HC ELECTRD BLD HEX COVD -A: Performed by: SURGERY

## 2019-08-01 PROCEDURE — 0DNU0ZZ RELEASE OMENTUM, OPEN APPROACH: ICD-10-PCS | Performed by: SURGERY

## 2019-08-01 PROCEDURE — 74011636637 HC RX REV CODE- 636/637: Performed by: NURSE ANESTHETIST, CERTIFIED REGISTERED

## 2019-08-01 PROCEDURE — 77030002933 HC SUT MCRYL J&J -A: Performed by: SURGERY

## 2019-08-01 RX ORDER — VECURONIUM BROMIDE FOR INJECTION 1 MG/ML
INJECTION, POWDER, LYOPHILIZED, FOR SOLUTION INTRAVENOUS AS NEEDED
Status: DISCONTINUED | OUTPATIENT
Start: 2019-08-01 | End: 2019-08-01 | Stop reason: HOSPADM

## 2019-08-01 RX ORDER — INSULIN LISPRO 100 [IU]/ML
INJECTION, SOLUTION INTRAVENOUS; SUBCUTANEOUS ONCE
Status: COMPLETED | OUTPATIENT
Start: 2019-08-01 | End: 2019-08-01

## 2019-08-01 RX ORDER — FAMOTIDINE 20 MG/1
20 TABLET, FILM COATED ORAL ONCE
Status: COMPLETED | OUTPATIENT
Start: 2019-08-01 | End: 2019-08-01

## 2019-08-01 RX ORDER — SUCCINYLCHOLINE CHLORIDE 100 MG/5ML
SYRINGE (ML) INTRAVENOUS AS NEEDED
Status: DISCONTINUED | OUTPATIENT
Start: 2019-08-01 | End: 2019-08-01 | Stop reason: HOSPADM

## 2019-08-01 RX ORDER — ONDANSETRON HYDROCHLORIDE 4 MG/2ML
INJECTION, SOLUTION INTRAMUSCULAR; INTRAVENOUS AS NEEDED
Status: DISCONTINUED | OUTPATIENT
Start: 2019-08-01 | End: 2019-08-01 | Stop reason: HOSPADM

## 2019-08-01 RX ORDER — SODIUM CHLORIDE, SODIUM LACTATE, POTASSIUM CHLORIDE, CALCIUM CHLORIDE 600; 310; 30; 20 MG/100ML; MG/100ML; MG/100ML; MG/100ML
50 INJECTION, SOLUTION INTRAVENOUS CONTINUOUS
Status: DISCONTINUED | OUTPATIENT
Start: 2019-08-01 | End: 2019-08-01 | Stop reason: HOSPADM

## 2019-08-01 RX ORDER — KETOROLAC TROMETHAMINE 30 MG/ML
30 INJECTION, SOLUTION INTRAMUSCULAR; INTRAVENOUS EVERY 6 HOURS
Status: DISCONTINUED | OUTPATIENT
Start: 2019-08-01 | End: 2019-08-03

## 2019-08-01 RX ORDER — MAG HYDROX/ALUMINUM HYD/SIMETH 200-200-20
SUSPENSION, ORAL (FINAL DOSE FORM) ORAL 3 TIMES DAILY
Status: DISCONTINUED | OUTPATIENT
Start: 2019-08-02 | End: 2019-08-07

## 2019-08-01 RX ORDER — GLYCOPYRROLATE 0.6MG/3ML
SYRINGE (ML) INTRAVENOUS AS NEEDED
Status: DISCONTINUED | OUTPATIENT
Start: 2019-08-01 | End: 2019-08-01 | Stop reason: HOSPADM

## 2019-08-01 RX ORDER — DIPHENHYDRAMINE HYDROCHLORIDE 50 MG/ML
25 INJECTION, SOLUTION INTRAMUSCULAR; INTRAVENOUS
Status: DISCONTINUED | OUTPATIENT
Start: 2019-08-01 | End: 2019-08-09 | Stop reason: HOSPADM

## 2019-08-01 RX ORDER — SODIUM CHLORIDE 0.9 % (FLUSH) 0.9 %
5-40 SYRINGE (ML) INJECTION EVERY 8 HOURS
Status: DISCONTINUED | OUTPATIENT
Start: 2019-08-01 | End: 2019-08-01 | Stop reason: HOSPADM

## 2019-08-01 RX ORDER — SODIUM CHLORIDE 0.9 % (FLUSH) 0.9 %
5-40 SYRINGE (ML) INJECTION AS NEEDED
Status: DISCONTINUED | OUTPATIENT
Start: 2019-08-01 | End: 2019-08-01 | Stop reason: HOSPADM

## 2019-08-01 RX ORDER — FENTANYL CITRATE 50 UG/ML
INJECTION, SOLUTION INTRAMUSCULAR; INTRAVENOUS AS NEEDED
Status: DISCONTINUED | OUTPATIENT
Start: 2019-08-01 | End: 2019-08-01 | Stop reason: HOSPADM

## 2019-08-01 RX ORDER — LIDOCAINE HYDROCHLORIDE 20 MG/ML
INJECTION, SOLUTION EPIDURAL; INFILTRATION; INTRACAUDAL; PERINEURAL AS NEEDED
Status: DISCONTINUED | OUTPATIENT
Start: 2019-08-01 | End: 2019-08-01 | Stop reason: HOSPADM

## 2019-08-01 RX ORDER — ONDANSETRON 2 MG/ML
8 INJECTION INTRAMUSCULAR; INTRAVENOUS
Status: DISCONTINUED | OUTPATIENT
Start: 2019-08-01 | End: 2019-08-09 | Stop reason: HOSPADM

## 2019-08-01 RX ORDER — CEFAZOLIN SODIUM 2 G/50ML
2 SOLUTION INTRAVENOUS
Status: COMPLETED | OUTPATIENT
Start: 2019-08-01 | End: 2019-08-01

## 2019-08-01 RX ORDER — HYDROMORPHONE HYDROCHLORIDE 1 MG/ML
1 INJECTION, SOLUTION INTRAMUSCULAR; INTRAVENOUS; SUBCUTANEOUS
Status: DISCONTINUED | OUTPATIENT
Start: 2019-08-01 | End: 2019-08-06

## 2019-08-01 RX ORDER — INSULIN LISPRO 100 [IU]/ML
INJECTION, SOLUTION INTRAVENOUS; SUBCUTANEOUS ONCE
Status: DISCONTINUED | OUTPATIENT
Start: 2019-08-01 | End: 2019-08-01 | Stop reason: HOSPADM

## 2019-08-01 RX ORDER — SODIUM CHLORIDE, SODIUM LACTATE, POTASSIUM CHLORIDE, CALCIUM CHLORIDE 600; 310; 30; 20 MG/100ML; MG/100ML; MG/100ML; MG/100ML
25 INJECTION, SOLUTION INTRAVENOUS CONTINUOUS
Status: DISCONTINUED | OUTPATIENT
Start: 2019-08-01 | End: 2019-08-08

## 2019-08-01 RX ORDER — CEFAZOLIN SODIUM 2 G/50ML
SOLUTION INTRAVENOUS
Status: COMPLETED
Start: 2019-08-01 | End: 2019-08-01

## 2019-08-01 RX ORDER — FENTANYL CITRATE 50 UG/ML
50 INJECTION, SOLUTION INTRAMUSCULAR; INTRAVENOUS AS NEEDED
Status: DISCONTINUED | OUTPATIENT
Start: 2019-08-01 | End: 2019-08-01 | Stop reason: HOSPADM

## 2019-08-01 RX ORDER — HYDROMORPHONE HYDROCHLORIDE 1 MG/ML
INJECTION, SOLUTION INTRAMUSCULAR; INTRAVENOUS; SUBCUTANEOUS AS NEEDED
Status: DISCONTINUED | OUTPATIENT
Start: 2019-08-01 | End: 2019-08-01 | Stop reason: HOSPADM

## 2019-08-01 RX ORDER — MIDAZOLAM HYDROCHLORIDE 1 MG/ML
INJECTION, SOLUTION INTRAMUSCULAR; INTRAVENOUS AS NEEDED
Status: DISCONTINUED | OUTPATIENT
Start: 2019-08-01 | End: 2019-08-01 | Stop reason: HOSPADM

## 2019-08-01 RX ORDER — DEXAMETHASONE SODIUM PHOSPHATE 4 MG/ML
INJECTION, SOLUTION INTRA-ARTICULAR; INTRALESIONAL; INTRAMUSCULAR; INTRAVENOUS; SOFT TISSUE AS NEEDED
Status: DISCONTINUED | OUTPATIENT
Start: 2019-08-01 | End: 2019-08-01 | Stop reason: HOSPADM

## 2019-08-01 RX ORDER — MAGNESIUM SULFATE 100 %
4 CRYSTALS MISCELLANEOUS AS NEEDED
Status: DISCONTINUED | OUTPATIENT
Start: 2019-08-01 | End: 2019-08-01 | Stop reason: HOSPADM

## 2019-08-01 RX ORDER — NEOSTIGMINE METHYLSULFATE 1 MG/ML
INJECTION, SOLUTION INTRAVENOUS AS NEEDED
Status: DISCONTINUED | OUTPATIENT
Start: 2019-08-01 | End: 2019-08-01 | Stop reason: HOSPADM

## 2019-08-01 RX ORDER — ONDANSETRON 2 MG/ML
4 INJECTION INTRAMUSCULAR; INTRAVENOUS ONCE
Status: DISCONTINUED | OUTPATIENT
Start: 2019-08-01 | End: 2019-08-01 | Stop reason: HOSPADM

## 2019-08-01 RX ORDER — KETOROLAC TROMETHAMINE 30 MG/ML
INJECTION, SOLUTION INTRAMUSCULAR; INTRAVENOUS AS NEEDED
Status: DISCONTINUED | OUTPATIENT
Start: 2019-08-01 | End: 2019-08-01 | Stop reason: HOSPADM

## 2019-08-01 RX ORDER — PROPOFOL 10 MG/ML
INJECTION, EMULSION INTRAVENOUS AS NEEDED
Status: DISCONTINUED | OUTPATIENT
Start: 2019-08-01 | End: 2019-08-01 | Stop reason: HOSPADM

## 2019-08-01 RX ORDER — LABETALOL HCL 20 MG/4 ML
SYRINGE (ML) INTRAVENOUS AS NEEDED
Status: DISCONTINUED | OUTPATIENT
Start: 2019-08-01 | End: 2019-08-01 | Stop reason: HOSPADM

## 2019-08-01 RX ORDER — HYDROMORPHONE HYDROCHLORIDE 1 MG/ML
0.5 INJECTION, SOLUTION INTRAMUSCULAR; INTRAVENOUS; SUBCUTANEOUS
Status: COMPLETED | OUTPATIENT
Start: 2019-08-01 | End: 2019-08-01

## 2019-08-01 RX ADMIN — VECURONIUM BROMIDE FOR INJECTION 5 MG: 1 INJECTION, POWDER, LYOPHILIZED, FOR SOLUTION INTRAVENOUS at 14:11

## 2019-08-01 RX ADMIN — SODIUM CHLORIDE, SODIUM LACTATE, POTASSIUM CHLORIDE, AND CALCIUM CHLORIDE 50 ML/HR: 600; 310; 30; 20 INJECTION, SOLUTION INTRAVENOUS at 12:39

## 2019-08-01 RX ADMIN — KETOROLAC TROMETHAMINE 30 MG: 30 INJECTION, SOLUTION INTRAMUSCULAR; INTRAVENOUS at 14:09

## 2019-08-01 RX ADMIN — HYDROMORPHONE HYDROCHLORIDE 1 MG: 1 INJECTION, SOLUTION INTRAMUSCULAR; INTRAVENOUS; SUBCUTANEOUS at 23:58

## 2019-08-01 RX ADMIN — SODIUM CHLORIDE, SODIUM LACTATE, POTASSIUM CHLORIDE, AND CALCIUM CHLORIDE 100 ML/HR: 600; 310; 30; 20 INJECTION, SOLUTION INTRAVENOUS at 17:59

## 2019-08-01 RX ADMIN — VECURONIUM BROMIDE FOR INJECTION 4 MG: 1 INJECTION, POWDER, LYOPHILIZED, FOR SOLUTION INTRAVENOUS at 12:58

## 2019-08-01 RX ADMIN — MIDAZOLAM HYDROCHLORIDE 2 MG: 1 INJECTION, SOLUTION INTRAMUSCULAR; INTRAVENOUS at 12:48

## 2019-08-01 RX ADMIN — Medication 0.6 MG: at 14:16

## 2019-08-01 RX ADMIN — SODIUM CHLORIDE, SODIUM LACTATE, POTASSIUM CHLORIDE, AND CALCIUM CHLORIDE 50 ML/HR: 600; 310; 30; 20 INJECTION, SOLUTION INTRAVENOUS at 15:18

## 2019-08-01 RX ADMIN — INSULIN LISPRO 3 UNITS: 100 INJECTION, SOLUTION INTRAVENOUS; SUBCUTANEOUS at 15:16

## 2019-08-01 RX ADMIN — PROPOFOL 200 MG: 10 INJECTION, EMULSION INTRAVENOUS at 12:55

## 2019-08-01 RX ADMIN — KETOROLAC TROMETHAMINE 30 MG: 30 INJECTION, SOLUTION INTRAMUSCULAR at 21:00

## 2019-08-01 RX ADMIN — HYDROMORPHONE HYDROCHLORIDE 1 MG: 1 INJECTION, SOLUTION INTRAMUSCULAR; INTRAVENOUS; SUBCUTANEOUS at 19:15

## 2019-08-01 RX ADMIN — HYDROMORPHONE HYDROCHLORIDE 0.5 MG: 1 INJECTION, SOLUTION INTRAMUSCULAR; INTRAVENOUS; SUBCUTANEOUS at 15:14

## 2019-08-01 RX ADMIN — LIDOCAINE HYDROCHLORIDE 60 MG: 20 INJECTION, SOLUTION EPIDURAL; INFILTRATION; INTRACAUDAL; PERINEURAL at 12:55

## 2019-08-01 RX ADMIN — CEFAZOLIN SODIUM 2 G: 2 SOLUTION INTRAVENOUS at 12:57

## 2019-08-01 RX ADMIN — DIPHENHYDRAMINE HYDROCHLORIDE 25 MG: 50 INJECTION, SOLUTION INTRAMUSCULAR; INTRAVENOUS at 19:12

## 2019-08-01 RX ADMIN — HYDROMORPHONE HYDROCHLORIDE 0.5 MG: 1 INJECTION, SOLUTION INTRAMUSCULAR; INTRAVENOUS; SUBCUTANEOUS at 15:58

## 2019-08-01 RX ADMIN — FAMOTIDINE 20 MG: 20 TABLET ORAL at 12:39

## 2019-08-01 RX ADMIN — HYDROMORPHONE HYDROCHLORIDE 0.5 MG: 1 INJECTION, SOLUTION INTRAMUSCULAR; INTRAVENOUS; SUBCUTANEOUS at 14:58

## 2019-08-01 RX ADMIN — HYDROMORPHONE HYDROCHLORIDE 0.5 MG: 1 INJECTION, SOLUTION INTRAMUSCULAR; INTRAVENOUS; SUBCUTANEOUS at 15:51

## 2019-08-01 RX ADMIN — VECURONIUM BROMIDE FOR INJECTION 1 MG: 1 INJECTION, POWDER, LYOPHILIZED, FOR SOLUTION INTRAVENOUS at 13:24

## 2019-08-01 RX ADMIN — ONDANSETRON HYDROCHLORIDE 4 MG: 4 INJECTION, SOLUTION INTRAMUSCULAR; INTRAVENOUS at 14:09

## 2019-08-01 RX ADMIN — Medication 100 MG: at 12:56

## 2019-08-01 RX ADMIN — FENTANYL CITRATE 100 MCG: 50 INJECTION, SOLUTION INTRAMUSCULAR; INTRAVENOUS at 12:55

## 2019-08-01 RX ADMIN — NEOSTIGMINE METHYLSULFATE 3 MG: 1 INJECTION, SOLUTION INTRAVENOUS at 14:16

## 2019-08-01 RX ADMIN — HYDROMORPHONE HYDROCHLORIDE 1 MG: 1 INJECTION, SOLUTION INTRAMUSCULAR; INTRAVENOUS; SUBCUTANEOUS at 13:00

## 2019-08-01 RX ADMIN — SODIUM CHLORIDE, SODIUM LACTATE, POTASSIUM CHLORIDE, AND CALCIUM CHLORIDE: 600; 310; 30; 20 INJECTION, SOLUTION INTRAVENOUS at 14:17

## 2019-08-01 RX ADMIN — DEXAMETHASONE SODIUM PHOSPHATE 4 MG: 4 INJECTION, SOLUTION INTRA-ARTICULAR; INTRALESIONAL; INTRAMUSCULAR; INTRAVENOUS; SOFT TISSUE at 12:58

## 2019-08-01 RX ADMIN — Medication 5 MG: at 13:18

## 2019-08-01 RX ADMIN — ONDANSETRON 8 MG: 2 INJECTION INTRAMUSCULAR; INTRAVENOUS at 19:14

## 2019-08-01 NOTE — ANESTHESIA PREPROCEDURE EVALUATION
Relevant Problems   No relevant active problems       Anesthetic History   No history of anesthetic complications            Review of Systems / Medical History  Patient summary reviewed and pertinent labs reviewed    Pulmonary            Asthma        Neuro/Psych   Within defined limits           Cardiovascular    Hypertension          Hyperlipidemia    Exercise tolerance: >4 METS     GI/Hepatic/Renal     GERD           Endo/Other    Diabetes: type 2, using insulin         Other Findings            Physical Exam    Airway  Mallampati: III  TM Distance: 4 - 6 cm  Neck ROM: normal range of motion   Mouth opening: Normal     Cardiovascular  Regular rate and rhythm,  S1 and S2 normal,  no murmur, click, rub, or gallop  Rhythm: regular  Rate: normal         Dental    Dentition: Lower dentition intact and Upper dentition intact     Pulmonary  Breath sounds clear to auscultation               Abdominal  GI exam deferred       Other Findings            Anesthetic Plan    ASA: 3  Anesthesia type: general          Induction: Intravenous  Anesthetic plan and risks discussed with: Patient

## 2019-08-01 NOTE — PROGRESS NOTES
Date of Surgery Update:  Kelsey Youngblood was seen and examined. History and physical has been reviewed. The patient has been examined. There have been no significant clinical changes since the completion of the originally dated History and Physical. Will proceed with exploratory laparotomy and lysis of adhesions. Dr. Lorraine Braga will do the removal of adnexal mass.      Signed By: Aleida Myles MD     August 1, 2019 12:03 PM

## 2019-08-01 NOTE — PROGRESS NOTES
[place] care of pt from PACU, received report from Courtney Da Silva, [facility]. Pt A & O x 4 with family at bedside. NG hooked to low intermittent suction. 2L of 02 admin via nasal cannula. Drainage marked on dressing for ongoing assessment. Re-enforced dressing with 4 x 4 and transparent tape. IV patent with no signs of infiltration. Lung sounds clear bilaterally. Bed in lowest position, white board updated, call bell in reach. Pt denies N/V. SCDs bilaterally. Pt already voided per Courtney Da Silva. Will continue to monitor. 200 Paged Dr. Edwige La MD on call, requested Benadryl IV order for itching, pt notes she commonly itches post op when recovering from surgeries. 1935 Bedside and Verbal shift change report given to [employer] (oncoming nurse) by Amber Johnson (offgoing nurse). Report included the following information SBAR, Kardex, Intake/Output and MAR.

## 2019-08-01 NOTE — PROGRESS NOTES
Problem: Diabetes Self-Management  Goal: *Disease process and treatment process  Description  Define diabetes and identify own type of diabetes; list 3 options for treating diabetes. Outcome: Progressing Towards Goal  Goal: *Incorporating nutritional management into lifestyle  Description  Describe effect of type, amount and timing of food on blood glucose; list 3 methods for planning meals. Outcome: Progressing Towards Goal  Goal: *Incorporating physical activity into lifestyle  Description  State effect of exercise on blood glucose levels. Outcome: Progressing Towards Goal  Goal: *Developing strategies to promote health/change behavior  Description  Define the ABC's of diabetes; identify appropriate screenings, schedule and personal plan for screenings. Outcome: Progressing Towards Goal  Goal: *Using medications safely  Description  State effect of diabetes medications on diabetes; name diabetes medication taking, action and side effects. Outcome: Progressing Towards Goal  Goal: *Monitoring blood glucose, interpreting and using results  Description  Identify recommended blood glucose targets  and personal targets. Outcome: Progressing Towards Goal  Goal: *Prevention, detection, treatment of acute complications  Description  List symptoms of hyper- and hypoglycemia; describe how to treat low blood sugar and actions for lowering  high blood glucose level. Outcome: Progressing Towards Goal  Goal: *Prevention, detection and treatment of chronic complications  Description  Define the natural course of diabetes and describe the relationship of blood glucose levels to long term complications of diabetes.   Outcome: Progressing Towards Goal  Goal: *Developing strategies to address psychosocial issues  Description  Describe feelings about living with diabetes; identify support needed and support network  Outcome: Progressing Towards Goal  Goal: *Insulin pump training  Outcome: Progressing Towards Goal  Goal: *Sick day guidelines  Outcome: Progressing Towards Goal  Goal: *Patient Specific Goal (EDIT GOAL, INSERT TEXT)  Outcome: Progressing Towards Goal     Problem: Patient Education: Go to Patient Education Activity  Goal: Patient/Family Education  Outcome: Progressing Towards Goal     Problem: Pain  Goal: *Control of Pain  Outcome: Progressing Towards Goal     Problem: Patient Education: Go to Patient Education Activity  Goal: Patient/Family Education  Outcome: Progressing Towards Goal     Problem: Falls - Risk of  Goal: *Absence of Falls  Description  Document Maira Jenkins Fall Risk and appropriate interventions in the flowsheet. Outcome: Progressing Towards Goal  Note:   Fall Risk Interventions:  Mobility Interventions: Patient to call before getting OOB    Mentation Interventions: Door open when patient unattended    Medication Interventions: Patient to call before getting OOB    Elimination Interventions: Call light in reach    History of Falls Interventions: Door open when patient unattended         Problem: Patient Education: Go to Patient Education Activity  Goal: Patient/Family Education  Outcome: Progressing Towards Goal     Problem: Pressure Injury - Risk of  Goal: *Prevention of pressure injury  Description  Document Cristi Scale and appropriate interventions in the flowsheet.   Outcome: Progressing Towards Goal  Note:   Pressure Injury Interventions:                                            Problem: Patient Education: Go to Patient Education Activity  Goal: Patient/Family Education  Outcome: Progressing Towards Goal

## 2019-08-01 NOTE — PERIOP NOTES
Pre-Op Summary    Pt arrived via car with family/friend and is oriented to time, place, person and situation. Patient with steady gait with none assistive devices. Visit Vitals  /89 (BP 1 Location: Left arm, BP Patient Position: At rest)   Pulse 83   Temp 98 °F (36.7 °C)   Resp 18   Ht 5' 4\" (1.626 m)   Wt 70.1 kg (154 lb 9.6 oz)   SpO2 100%   BMI 26.54 kg/m²       Peripheral IV located on Left hand . Patients belongings are located with family. Patient's point of contact is - Jerzy Nance and mom Elly Nunez and their contact number is:  [phone]. They will be in the waiting room. They are able to receive medication information. They will be admitted.

## 2019-08-01 NOTE — OP NOTES
[facility]  OPERATIVE REPORT    Name:  Guera Yee  MR#:   [id number]  :  1960  ACCOUNT #:  [de-identified]  DATE OF SERVICE:  2019    PREOPERATIVE DIAGNOSIS:  Abdominal pain and left adnexal mass. POSTOPERATIVE DIAGNOSIS:  Severe adhesions. PROCEDURES PERFORMED:      1. Exploratory laparotomy with extensive lysis of adhesion (more than 80% of the time of the surgery)  2. Reduction of internal hernia  3. Small bowel resection with primary anastomosis. SURGEON:  Alfonso Martin MD    ASSISTANT:  Caesar Aaron SA    ANESTHESIA:  General.    COMPLICATIONS:  Serosal/mucosal tear of the small bowel. SPECIMENS REMOVED:  Small bowel. IMPLANTS:  None. ESTIMATED BLOOD LOSS:  Minimal.    PROCEDURE:  The patient was brought to operating room, anesthesia was induced, scrubbing and draping of the abdomen was done in usual manner. A time-out was performed. A midline laparotomy was performed first.  I performed a midline laparotomy in the middle of   the abdomen just around 10 cm long, deepened through the skin and subcutaneous tissue. I did reach multiple old sutures and then saw what seems to appear the mesh. I was not able to safely go through the mesh, so I had to make the incision bigger superiorly. I was able to enter the fascia on the upper part though there was also a smaller mesh in this area but I was able to go through it, and when I entered the abdomen, there was significant adhesion between what seems to be part of a small omentum and remaining small bowel into the abdominal wall. Under direct visualization, I started dissecting this area first with my finger and then with the Metzenbaum.   I was able to open the fascia as we got along down towards the pelvic with either Metzenbaum or cautery depending on how much adhesions there were and then continuous  lysis of adhesion was performed meanwhile with Metzenbaum, and after the full lysis of adhesion on the upper part, we were able to reach the lower abdomen and the fascia was divided again with Metzenbaum, and there was significant adhesion between the small bowel and abdominal wall as well as between the small bowel themselves. After extensive lysis of adhesion, I was able to find an area where there was what seems to be like an internal hernia of the small bowel around its own mesentery. This was reduced and I was able to find the ligament of Treitz which looked normal and then I continued with lysis of adhesion all the way down into the pelvis. There was significant adhesion between the small bowel and the anterior abdominal wall in the pelvic area. I was able to do all of this using Metzenbaum and then I reached ileocecal valve. So at this point, during the dissection, there was a serosal tear and just next to it a mucosal tear as well. It is very close to the anastomosis that was performed in her previous laparotomy. Given the fact that it was very close to the area of the anastomosis and given the fact that there was question nadia of dilatation on the CT scan, I decided to do a bowel resection and dilatation of the anastomosis. So the area where the mucosal tear was seen was identified again and just about 1 cm proximal to it a JESSICA blue load was used to divide the small bowel and then about 5 cm distal to the small bowel anastomosis, another JESSICA was fired to divide the small bowel and a side-to-side anastomosis was performed using a JESSICA and then a 2-0 Vicryl suture was used to approximate the small bowel to each other to take the tension off the staple line. At this point, Dr. Cordelia Corral started her part of the surgery. She tried to identify the adnexal cyst that was seen on the CT scan, but after multiple attempts she could not find it. So, at this point she decided to abort with this part of the procedure.   So at this point, I ran the small bowel again from the ligament of Treitz to the ileocecal valve and it looked to be normal with no injuries or serosal tear. There was no omentum to place on top of the bowel, so we closed the midline fascia with the old meshes together with a running #1 PDS suture superiorly and inferiorly and then both ends were joined together in the midline. The skin was approximated with skin stapler. A sterile dressing was applied.       Kandice Reynolds MD      [id number]  D:  08/01/2019 14:37  T:  08/01/2019 16:17  JOB #:  [id number]

## 2019-08-01 NOTE — BRIEF OP NOTE
BRIEF OPERATIVE NOTE    Date of Procedure: 8/1/2019   Preoperative Diagnosis: Lower Abdominal Pain R10.30, Pelvic Pain R10.2, N83.20, R10.2, G89.29  Postoperative Diagnosis: Lower Abdominal Pain R10.30, Pelvic Pain R10.2, N83.20, R10.2, G89.29    Procedure(s):  Exploratory Laparotomy, Lysis of Adhesions, Small bowel resection. Surgeon(s) and Role:  Panel 1:     * Raegan Mayes MD - Primary  Panel 2:     * Nayan Soriano MD - Primary  Surgical Staff:  Circ-1: Azucena Fang RN  Circ-Relief: Sherryle Eth, RN  Scrub Tech-1: Retiponcho Brian P  Surg Asst-1: Ho Granado  Event Time In Time Out   Incision Start 1300    Incision Close       Anesthesia: General   Estimated Blood Loss: Minimal.  Specimens:   ID Type Source Tests Collected by Time Destination   1 : small bowel Preservative   Raegan Mayes MD 8/1/2019 1420 Pathology      Findings: Severe    Complications: None.   Implants: * No implants in log *

## 2019-08-01 NOTE — PERIOP NOTES
[address] care of patient upon arrival to PACU. Patient drowsy, responsive to verbal stimuli. Placed on monitor x3. VSS. Visual pain scale 0.    1635  TRANSFER - OUT REPORT:    Verbal report given to Donnell Kaur (name) on Daryel Dumont  being transferred [place] [facility] [place] (unit) for routine post - op       Report consisted of patients Situation, Background, Assessment and   Recommendations(SBAR). Information from the following report(s) OR Summary, MAR, Recent Results and Cardiac Rhythm NSr was reviewed with the receiving nurse. Lines:   Peripheral IV 08/01/19 Left Hand (Active)   Site Assessment Clean, dry, & intact 8/1/2019  2:28 PM   Phlebitis Assessment 0 8/1/2019  2:28 PM   Infiltration Assessment 0 8/1/2019  2:28 PM   Dressing Status Clean, dry, & intact 8/1/2019  2:28 PM   Dressing Type Transparent;Tape 8/1/2019  2:28 PM   Hub Color/Line Status Pink; Infusing 8/1/2019  2:28 PM   Action Taken Open ports on tubing capped 8/1/2019  2:28 PM   Alcohol Cap Used Yes 8/1/2019  2:28 PM        Opportunity for questions and clarification was provided.       Patient transported with:   O2 @ 2 liters  Registered Nurse  [facility]

## 2019-08-02 LAB
ANION GAP SERPL CALC-SCNC: 8 MMOL/L (ref 3–18)
BASOPHILS # BLD: 0 K/UL (ref 0–0.1)
BASOPHILS NFR BLD: 0 % (ref 0–2)
BUN SERPL-MCNC: 23 MG/DL (ref 7–18)
BUN/CREAT SERPL: 19 (ref 12–20)
CALCIUM SERPL-MCNC: 8.7 MG/DL (ref 8.5–10.1)
CHLORIDE SERPL-SCNC: 103 MMOL/L (ref 100–111)
CO2 SERPL-SCNC: 27 MMOL/L (ref 21–32)
CREAT SERPL-MCNC: 1.24 MG/DL (ref 0.6–1.3)
DIFFERENTIAL METHOD BLD: ABNORMAL
EOSINOPHIL # BLD: 0 K/UL (ref 0–0.4)
EOSINOPHIL NFR BLD: 0 % (ref 0–5)
ERYTHROCYTE [DISTWIDTH] IN BLOOD BY AUTOMATED COUNT: 16.6 % (ref 11.6–14.5)
EST. AVERAGE GLUCOSE BLD GHB EST-MCNC: 177 MG/DL
GLUCOSE BLD STRIP.AUTO-MCNC: 141 MG/DL (ref 70–110)
GLUCOSE BLD STRIP.AUTO-MCNC: 161 MG/DL (ref 70–110)
GLUCOSE SERPL-MCNC: 195 MG/DL (ref 74–99)
HBA1C MFR BLD: 7.8 % (ref 4.2–5.6)
HCT VFR BLD AUTO: 36.7 % (ref 35–45)
HGB BLD-MCNC: 11.6 G/DL (ref 12–16)
LYMPHOCYTES # BLD: 0.8 K/UL (ref 0.9–3.6)
LYMPHOCYTES NFR BLD: 9 % (ref 21–52)
MCH RBC QN AUTO: 27.2 PG (ref 24–34)
MCHC RBC AUTO-ENTMCNC: 31.6 G/DL (ref 31–37)
MCV RBC AUTO: 85.9 FL (ref 74–97)
MONOCYTES # BLD: 0.9 K/UL (ref 0.05–1.2)
MONOCYTES NFR BLD: 10 % (ref 3–10)
NEUTS SEG # BLD: 7.4 K/UL (ref 1.8–8)
NEUTS SEG NFR BLD: 81 % (ref 40–73)
PLATELET # BLD AUTO: 270 K/UL (ref 135–420)
PMV BLD AUTO: 10.3 FL (ref 9.2–11.8)
POTASSIUM SERPL-SCNC: 4.6 MMOL/L (ref 3.5–5.5)
RBC # BLD AUTO: 4.27 M/UL (ref 4.2–5.3)
SODIUM SERPL-SCNC: 138 MMOL/L (ref 136–145)
WBC # BLD AUTO: 9.1 K/UL (ref 4.6–13.2)

## 2019-08-02 PROCEDURE — 82962 GLUCOSE BLOOD TEST: CPT

## 2019-08-02 PROCEDURE — 77010033678 HC OXYGEN DAILY

## 2019-08-02 PROCEDURE — 80048 BASIC METABOLIC PNL TOTAL CA: CPT

## 2019-08-02 PROCEDURE — C9113 INJ PANTOPRAZOLE SODIUM, VIA: HCPCS | Performed by: SURGERY

## 2019-08-02 PROCEDURE — 74011250636 HC RX REV CODE- 250/636: Performed by: SURGERY

## 2019-08-02 PROCEDURE — 83036 HEMOGLOBIN GLYCOSYLATED A1C: CPT

## 2019-08-02 PROCEDURE — 36415 COLL VENOUS BLD VENIPUNCTURE: CPT

## 2019-08-02 PROCEDURE — 74011250637 HC RX REV CODE- 250/637: Performed by: SURGERY

## 2019-08-02 PROCEDURE — 85025 COMPLETE CBC W/AUTO DIFF WBC: CPT

## 2019-08-02 PROCEDURE — 74011000250 HC RX REV CODE- 250: Performed by: SURGERY

## 2019-08-02 PROCEDURE — 65270000029 HC RM PRIVATE

## 2019-08-02 RX ORDER — MAGNESIUM SULFATE 100 %
4 CRYSTALS MISCELLANEOUS AS NEEDED
Status: DISCONTINUED | OUTPATIENT
Start: 2019-08-02 | End: 2019-08-09 | Stop reason: HOSPADM

## 2019-08-02 RX ORDER — INSULIN LISPRO 100 [IU]/ML
INJECTION, SOLUTION INTRAVENOUS; SUBCUTANEOUS EVERY 6 HOURS
Status: DISCONTINUED | OUTPATIENT
Start: 2019-08-02 | End: 2019-08-09 | Stop reason: HOSPADM

## 2019-08-02 RX ORDER — ENOXAPARIN SODIUM 100 MG/ML
40 INJECTION SUBCUTANEOUS EVERY 24 HOURS
Status: DISCONTINUED | OUTPATIENT
Start: 2019-08-02 | End: 2019-08-03

## 2019-08-02 RX ADMIN — KETOROLAC TROMETHAMINE 30 MG: 30 INJECTION, SOLUTION INTRAMUSCULAR at 21:00

## 2019-08-02 RX ADMIN — ENOXAPARIN SODIUM 40 MG: 40 INJECTION SUBCUTANEOUS at 07:23

## 2019-08-02 RX ADMIN — KETOROLAC TROMETHAMINE 30 MG: 30 INJECTION, SOLUTION INTRAMUSCULAR at 03:00

## 2019-08-02 RX ADMIN — HYDROMORPHONE HYDROCHLORIDE 1 MG: 1 INJECTION, SOLUTION INTRAMUSCULAR; INTRAVENOUS; SUBCUTANEOUS at 16:25

## 2019-08-02 RX ADMIN — HYDROCORTISONE: 1 OINTMENT TOPICAL at 11:47

## 2019-08-02 RX ADMIN — SODIUM CHLORIDE, SODIUM LACTATE, POTASSIUM CHLORIDE, AND CALCIUM CHLORIDE 1000 ML: 600; 310; 30; 20 INJECTION, SOLUTION INTRAVENOUS at 05:30

## 2019-08-02 RX ADMIN — HYDROCORTISONE: 1 OINTMENT TOPICAL at 22:00

## 2019-08-02 RX ADMIN — HYDROCORTISONE: 1 OINTMENT TOPICAL at 17:53

## 2019-08-02 RX ADMIN — SODIUM CHLORIDE, SODIUM LACTATE, POTASSIUM CHLORIDE, AND CALCIUM CHLORIDE 100 ML/HR: 600; 310; 30; 20 INJECTION, SOLUTION INTRAVENOUS at 14:13

## 2019-08-02 RX ADMIN — HYDROMORPHONE HYDROCHLORIDE 1 MG: 1 INJECTION, SOLUTION INTRAMUSCULAR; INTRAVENOUS; SUBCUTANEOUS at 11:40

## 2019-08-02 RX ADMIN — SODIUM CHLORIDE, SODIUM LACTATE, POTASSIUM CHLORIDE, AND CALCIUM CHLORIDE 100 ML/HR: 600; 310; 30; 20 INJECTION, SOLUTION INTRAVENOUS at 04:11

## 2019-08-02 RX ADMIN — KETOROLAC TROMETHAMINE 30 MG: 30 INJECTION, SOLUTION INTRAMUSCULAR at 14:09

## 2019-08-02 RX ADMIN — KETOROLAC TROMETHAMINE 30 MG: 30 INJECTION, SOLUTION INTRAMUSCULAR at 08:01

## 2019-08-02 RX ADMIN — SODIUM CHLORIDE 40 MG: 9 INJECTION INTRAMUSCULAR; INTRAVENOUS; SUBCUTANEOUS at 11:40

## 2019-08-02 RX ADMIN — DIPHENHYDRAMINE HYDROCHLORIDE 25 MG: 50 INJECTION, SOLUTION INTRAMUSCULAR; INTRAVENOUS at 21:55

## 2019-08-02 RX ADMIN — DIPHENHYDRAMINE HYDROCHLORIDE 25 MG: 50 INJECTION, SOLUTION INTRAMUSCULAR; INTRAVENOUS at 01:22

## 2019-08-02 RX ADMIN — HYDROMORPHONE HYDROCHLORIDE 1 MG: 1 INJECTION, SOLUTION INTRAMUSCULAR; INTRAVENOUS; SUBCUTANEOUS at 07:23

## 2019-08-02 RX ADMIN — DIPHENHYDRAMINE HYDROCHLORIDE 25 MG: 50 INJECTION, SOLUTION INTRAMUSCULAR; INTRAVENOUS at 14:09

## 2019-08-02 RX ADMIN — DIPHENHYDRAMINE HYDROCHLORIDE 25 MG: 50 INJECTION, SOLUTION INTRAMUSCULAR; INTRAVENOUS at 08:01

## 2019-08-02 NOTE — PROGRESS NOTES
Patient seen and examined. She is complaining of abdominal pain at the incision site. She is complaining of itching too. She made about 300 cc of urine overnight. She has no fever. Her HR and BP are normal.   Her NG tube in place. Has some minimal bilious drainage. Her abdomen is tender at the incision site. Soft. Non-distend. I removed her dressing. Wound is clean. Kept it open to air. WBC and H and H are normal.  Creatinine is 1.2 (baseline is 1 to 1.2)    Imp/Plan:    S/P laparotomy and extensive lysis of adhesions and small bowel resection. Keep NG tube. Await for return of bowel function which I expect it to take few days based on her extensive adhesions and bowel resection. Watch her urine output (which is ok for now), but I place an order for 1 liter LR bolus over couple hours.  Resume 100 cc per hour  Ambulation  Pain management  Repeat labs tomorrow  DVT prophylaxis  PPI

## 2019-08-02 NOTE — ANESTHESIA POSTPROCEDURE EVALUATION
Procedure(s):  Exploratory Laparotomy, Lysis of Adhesions, small bowel resection  exploratory laparotomy. general    Anesthesia Post Evaluation      Multimodal analgesia: multimodal analgesia used between 6 hours prior to anesthesia start to PACU discharge  Patient location during evaluation: bedside  Patient participation: complete - patient participated  Level of consciousness: awake  Pain score: 5  Pain management: adequate  Airway patency: patent  Anesthetic complications: no  Cardiovascular status: stable  Respiratory status: acceptable  Hydration status: acceptable  Post anesthesia nausea and vomiting:  none      Vitals Value Taken Time   /78 8/1/2019  5:14 PM   Temp 36.5 °C (97.7 °F) 8/1/2019  5:14 PM   Pulse 82 8/1/2019  5:20 PM   Resp 12 8/1/2019  5:20 PM   SpO2 97 % 8/1/2019  5:20 PM   Vitals shown include unvalidated device data.

## 2019-08-02 NOTE — OP NOTES
Operative Report    Patient: William Smith MRN: [id number]  SSN: [id number]    YOB: 1960  Age: 61 y.o. Sex: female       Date of Surgery:  8/1/2019     Preoperative Diagnosis: Lower Abdominal Pain R10.30, Pelvic Pain R10.2, N83.20, R10.2, G89.29    Postoperative Diagnosis: Lower Abdominal Pain R10.30, Pelvic Pain R10.2, N83.20, R10.2, G89.29    Surgeon(s) and Role:  Panel 1:     * Alice Murphy MD - Primary  Panel 2:     * Bety Kim MD - Primary    Anesthesia: General    Procedure: Procedure(s):  Exploratory Laparotomy, Lysis of Adhesions, small bowel resection  exploratory laparotomy      Estimated Blood Loss:  Minimal    Drains: none and Nasogastric Tube    Implants:  * No implants in log *    Specimen:    ID Type Source Tests Collected by Time Destination   1 : small bowel Preservative   Alice Murphy MD 8/1/2019 1420 Pathology        Findings:  Extensive adhesions of the small and large bowels. No definitive adnexal mass palpable or visible. Procedure: Please see Dr. Zahra Chris operative note detailing the laparorotomy portion of the case. After the bowels were returned to the normal anatomy, a thorough attempt was made to locate the left adnexal mass apparent on CT and ultrasound. The small and large bowel were freed by Dr. Rossy Baig. Along the left side wall, the sigmoid colon was identified as well as the psoas muscle and multiple attempts were made to visualize or palpate the 3-4 cm cyst seen on imaging. Locating the cyst was unsuccessful and landmarks were very distorted due to adhesive disease. Ureters were not identified. The remainder of the case was then performed by Dr. Rossy Baig and the abdomen closed per usual.  Sponge, instruments and needles were correct. The patient was taken to the recovery room extubated and in stable condition.

## 2019-08-02 NOTE — PROGRESS NOTES
Problem: Discharge Planning  Goal: *Discharge to safe environment  Outcome: Resolved/Met   Plan home  Reason for Admission:   Bowel resection lysis of adhesions                  RRAT Score: 19                 Do you (patient/family) have any concerns for transition/discharge? no                Plan for utilizing home health:   no    Current Advanced Directive/Advance Care Plan:  none            Transition of Care Plan:    Spoke with pt, lives with spouse. Independent with adls and amb.designates spouse for dcp, transport home. Has a walker, w/c cane at home, she does not use. pcp Dr Jimmie Luevano, saw last wk. Demographics correct. Plan home. Patient has designated ___spouse_____________________ to participate in his/her discharge plan and to receive any needed information. Name: Amarilis Shoemaker  Address:  Phone number: [phone]    Care Management Interventions  PCP Verified by CM: Yes  Palliative Care Criteria Met (RRAT>21 & CHF Dx)?: No  Mode of Transport at Discharge:  Other (see comment)  Transition of Care Consult (CM Consult): Discharge Planning  Discharge Durable Medical Equipment: No  Physical Therapy Consult: No  Occupational Therapy Consult: No  Speech Therapy Consult: No  Current Support Network: Lives with Spouse  Confirm Follow Up Transport: Family  Plan discussed with Pt/Family/Caregiver: Yes  Discharge Location  Discharge Placement: Home

## 2019-08-02 NOTE — PROGRESS NOTES
Assumed care of patient , patient in bed resting, pain medication given prior to shift change. Ng tube to left nare draining dark black liquid. Saturated dressing to patient abd post surgical, marked by previous nurse. Sbar report received from [facility].             2308: patient complaining of itching, verbal order received from Dr. Jam Swain to administer hydrocortisone 3 x per day to affect areas. Verbal  Order entered as received. 0122: Per patient request, patient reeived benadryl 25mg IV       556: Dr. Avila Messina came to see the patient, and removed dressing from patient abd,, open to air. 0740: Bedside / verbal shift change report given to Shahrzad Maldonado  (oncoming nurse) by Agata Dao RN (offgoing nurse). Report included the following information SBAR, Kardex, Intake/Output, MAR and Recent Results.

## 2019-08-02 NOTE — PROGRESS NOTES
Gynecology Progress Note    Patient doing well post-op day 1 from Procedure(s):  Exploratory Laparotomy, Lysis of Adhesions, small bowel resection  exploratory laparotomy without significant complaints. Pain controlled on current medication. Voiding without difficulty. Patient is not passing flatus. She states the pain is marginally controlled. Vitals:  Blood pressure 113/72, pulse 95, temperature 98.4 °F (36.9 °C), resp. rate 20, height 5' 4\" (1.626 m), weight 154 lb 9.6 oz (70.1 kg), SpO2 100 %. Temp (24hrs), Av °F (36.7 °C), Min:96.8 °F (36 °C), Max:98.7 °F (37.1 °C)        Exam:  Patient without distress. Abdomen soft,  nontender. Incision dry and clean without erythema. Lower extremities are negative for swelling, cords, or tenderness. Lab/Data Review:  CBC:   Lab Results   Component Value Date/Time    WBC 9.1 2019 04:15 AM    HGB 11.6 (L) 2019 04:15 AM    HCT 36.7 2019 04:15 AM     2019 04:15 AM       Assessment and Plan:  Patient appears to be having uncomplicated post Procedure(s):  Exploratory Laparotomy, Lysis of Adhesions, small bowel resection  exploratory laparotomy course. Continue routine post-op care. 1. Pt is in good spirits. She is doing well thus far. Reviewed details of the surgery and that there was no visible or palpable adnexal mass. No GYN concerns at this time. Available as needed.

## 2019-08-02 NOTE — PROGRESS NOTES
Problem: Diabetes Self-Management  Goal: *Disease process and treatment process  Description  Define diabetes and identify own type of diabetes; list 3 options for treating diabetes. Outcome: Progressing Towards Goal  Goal: *Incorporating nutritional management into lifestyle  Description  Describe effect of type, amount and timing of food on blood glucose; list 3 methods for planning meals. Outcome: Progressing Towards Goal  Goal: *Incorporating physical activity into lifestyle  Description  State effect of exercise on blood glucose levels. Outcome: Progressing Towards Goal  Goal: *Developing strategies to promote health/change behavior  Description  Define the ABC's of diabetes; identify appropriate screenings, schedule and personal plan for screenings. Outcome: Progressing Towards Goal  Goal: *Using medications safely  Description  State effect of diabetes medications on diabetes; name diabetes medication taking, action and side effects. Outcome: Progressing Towards Goal  Goal: *Monitoring blood glucose, interpreting and using results  Description  Identify recommended blood glucose targets  and personal targets. Outcome: Progressing Towards Goal  Goal: *Prevention, detection, treatment of acute complications  Description  List symptoms of hyper- and hypoglycemia; describe how to treat low blood sugar and actions for lowering  high blood glucose level. Outcome: Progressing Towards Goal  Goal: *Prevention, detection and treatment of chronic complications  Description  Define the natural course of diabetes and describe the relationship of blood glucose levels to long term complications of diabetes.   Outcome: Progressing Towards Goal  Goal: *Developing strategies to address psychosocial issues  Description  Describe feelings about living with diabetes; identify support needed and support network  Outcome: Progressing Towards Goal  Goal: *Insulin pump training  Outcome: Progressing Towards Goal  Goal: *Sick day guidelines  Outcome: Progressing Towards Goal  Goal: *Patient Specific Goal (EDIT GOAL, INSERT TEXT)  Outcome: Progressing Towards Goal     Problem: Patient Education: Go to Patient Education Activity  Goal: Patient/Family Education  Outcome: Progressing Towards Goal     Problem: Pain  Goal: *Control of Pain  Outcome: Progressing Towards Goal     Problem: Patient Education: Go to Patient Education Activity  Goal: Patient/Family Education  Outcome: Progressing Towards Goal     Problem: Falls - Risk of  Goal: *Absence of Falls  Description  Document Paolo Raymond Fall Risk and appropriate interventions in the flowsheet. Outcome: Progressing Towards Goal  Note:   Fall Risk Interventions:  Mobility Interventions: Patient to call before getting OOB    Mentation Interventions: Adequate sleep, hydration, pain control    Medication Interventions: Assess postural VS orthostatic hypotension    Elimination Interventions: Call light in reach    History of Falls Interventions: Room close to nurse's station         Problem: Patient Education: Go to Patient Education Activity  Goal: Patient/Family Education  Outcome: Progressing Towards Goal     Problem: Pressure Injury - Risk of  Goal: *Prevention of pressure injury  Description  Document Cristi Scale and appropriate interventions in the flowsheet.   Outcome: Progressing Towards Goal  Note:   Pressure Injury Interventions:       Moisture Interventions: Absorbent underpads    Activity Interventions: Increase time out of bed    Mobility Interventions: Assess need for specialty bed    Nutrition Interventions: Document food/fluid/supplement intake                     Problem: Patient Education: Go to Patient Education Activity  Goal: Patient/Family Education  Outcome: Progressing Towards Goal

## 2019-08-02 NOTE — PROGRESS NOTES
conducted an initial consultation and Spiritual Assessment for Bernadine Swain, who is a 61 y.o.,female. Patients Primary Language is: [place]. According to the patients EMR Orthodoxy Affiliation is: [facility].     The reason the Patient came to the hospital is:   Patient Active Problem List    Diagnosis Date Noted    S/P small bowel resection 08/01/2019    Adnexal cyst 07/30/2019    Chronic pelvic pain in female 07/30/2019    Bowel obstruction ([address] 08/23/2018    Non-intractable cyclical vomiting with nausea 01/24/2018    Type 2 diabetes mellitus with nephropathy ([address] 01/19/2018    Chronic abdominal pain 03/14/2017    Post-operative pain 03/14/2017    SBO (small bowel obstruction) ([facility] [address] 02/21/2017    Osteoarthritis of left knee 06/27/2016    Hypertension 06/27/2016    Hyperlipidemia 06/27/2016    GERD (gastroesophageal reflux disease) 06/27/2016    Asthma 06/27/2016    Type 2 diabetes mellitus ([facility] [address] 06/27/2016    Sural neuritis 06/23/2014    Chest pain 04/20/2014    Essential hypertension, benign 02/27/2012    Generalized abdominal pain         The  provided the following Interventions:  Initiated a relationship of care and support. Explored issues of meeta, spirituality and/or Yazidism needs while hospitalized. Listened empathically. Provided chaplaincy education. Provided information about Spiritual Care Services. Offered prayer and assurance of continued prayers on patient's behalf. Chart reviewed. The following outcomes were achieved:  Patient shared some information about their medical narrative and spiritual journey/beliefs. Patient processed feeling about current hospitalization. Patient expressed gratitude for the 's visit. Assessment:  Patient did not indicate any spiritual or Yazidism issues which require Spiritual Care Services interventions at this time.    Patient does not have any Yazidism/cultural needs that will affect patients preferences in health care. Plan:  Chaplains will continue to follow and will provide pastoral care on an as needed or requested basis.  recommends bedside caregivers page  on duty if patient shows signs of acute spiritual or emotional distress.     [address] [facility]   Staff [address] [facility]   [phone]

## 2019-08-03 LAB
ANION GAP SERPL CALC-SCNC: 9 MMOL/L (ref 3–18)
BASOPHILS # BLD: 0 K/UL (ref 0–0.1)
BASOPHILS NFR BLD: 0 % (ref 0–2)
BUN SERPL-MCNC: 20 MG/DL (ref 7–18)
BUN/CREAT SERPL: 17 (ref 12–20)
CALCIUM SERPL-MCNC: 8.5 MG/DL (ref 8.5–10.1)
CHLORIDE SERPL-SCNC: 104 MMOL/L (ref 100–111)
CO2 SERPL-SCNC: 27 MMOL/L (ref 21–32)
CREAT SERPL-MCNC: 1.19 MG/DL (ref 0.6–1.3)
DIFFERENTIAL METHOD BLD: ABNORMAL
EOSINOPHIL # BLD: 0.1 K/UL (ref 0–0.4)
EOSINOPHIL NFR BLD: 1 % (ref 0–5)
ERYTHROCYTE [DISTWIDTH] IN BLOOD BY AUTOMATED COUNT: 17.1 % (ref 11.6–14.5)
GLUCOSE BLD STRIP.AUTO-MCNC: 100 MG/DL (ref 70–110)
GLUCOSE BLD STRIP.AUTO-MCNC: 116 MG/DL (ref 70–110)
GLUCOSE BLD STRIP.AUTO-MCNC: 126 MG/DL (ref 70–110)
GLUCOSE BLD STRIP.AUTO-MCNC: 144 MG/DL (ref 70–110)
GLUCOSE SERPL-MCNC: 176 MG/DL (ref 74–99)
HCT VFR BLD AUTO: 24 % (ref 35–45)
HCT VFR BLD AUTO: 25.7 % (ref 35–45)
HCT VFR BLD AUTO: 30.2 % (ref 35–45)
HGB BLD-MCNC: 7.6 G/DL (ref 12–16)
HGB BLD-MCNC: 8.3 G/DL (ref 12–16)
HGB BLD-MCNC: 9.6 G/DL (ref 12–16)
LYMPHOCYTES # BLD: 0.9 K/UL (ref 0.9–3.6)
LYMPHOCYTES NFR BLD: 9 % (ref 21–52)
MCH RBC QN AUTO: 27.1 PG (ref 24–34)
MCH RBC QN AUTO: 27.3 PG (ref 24–34)
MCH RBC QN AUTO: 27.6 PG (ref 24–34)
MCHC RBC AUTO-ENTMCNC: 31.7 G/DL (ref 31–37)
MCHC RBC AUTO-ENTMCNC: 31.8 G/DL (ref 31–37)
MCHC RBC AUTO-ENTMCNC: 32.3 G/DL (ref 31–37)
MCV RBC AUTO: 85.4 FL (ref 74–97)
MCV RBC AUTO: 85.7 FL (ref 74–97)
MCV RBC AUTO: 85.8 FL (ref 74–97)
MONOCYTES # BLD: 0.9 K/UL (ref 0.05–1.2)
MONOCYTES NFR BLD: 9 % (ref 3–10)
NEUTS SEG # BLD: 8.4 K/UL (ref 1.8–8)
NEUTS SEG NFR BLD: 81 % (ref 40–73)
PLATELET # BLD AUTO: 262 K/UL (ref 135–420)
PLATELET # BLD AUTO: 268 K/UL (ref 135–420)
PLATELET # BLD AUTO: 304 K/UL (ref 135–420)
PMV BLD AUTO: 10.7 FL (ref 9.2–11.8)
PMV BLD AUTO: 9.8 FL (ref 9.2–11.8)
PMV BLD AUTO: 9.9 FL (ref 9.2–11.8)
POTASSIUM SERPL-SCNC: 4.3 MMOL/L (ref 3.5–5.5)
RBC # BLD AUTO: 2.8 M/UL (ref 4.2–5.3)
RBC # BLD AUTO: 3.01 M/UL (ref 4.2–5.3)
RBC # BLD AUTO: 3.52 M/UL (ref 4.2–5.3)
SODIUM SERPL-SCNC: 140 MMOL/L (ref 136–145)
WBC # BLD AUTO: 10.2 K/UL (ref 4.6–13.2)
WBC # BLD AUTO: 8.4 K/UL (ref 4.6–13.2)
WBC # BLD AUTO: 9.3 K/UL (ref 4.6–13.2)

## 2019-08-03 PROCEDURE — 74011250637 HC RX REV CODE- 250/637: Performed by: SURGERY

## 2019-08-03 PROCEDURE — 82962 GLUCOSE BLOOD TEST: CPT

## 2019-08-03 PROCEDURE — 85025 COMPLETE CBC W/AUTO DIFF WBC: CPT

## 2019-08-03 PROCEDURE — 85027 COMPLETE CBC AUTOMATED: CPT

## 2019-08-03 PROCEDURE — 74011250636 HC RX REV CODE- 250/636: Performed by: SURGERY

## 2019-08-03 PROCEDURE — 80048 BASIC METABOLIC PNL TOTAL CA: CPT

## 2019-08-03 PROCEDURE — C9113 INJ PANTOPRAZOLE SODIUM, VIA: HCPCS | Performed by: SURGERY

## 2019-08-03 PROCEDURE — 74011000250 HC RX REV CODE- 250: Performed by: SURGERY

## 2019-08-03 PROCEDURE — 36415 COLL VENOUS BLD VENIPUNCTURE: CPT

## 2019-08-03 PROCEDURE — 65270000029 HC RM PRIVATE

## 2019-08-03 RX ADMIN — HYDROMORPHONE HYDROCHLORIDE 1 MG: 1 INJECTION, SOLUTION INTRAMUSCULAR; INTRAVENOUS; SUBCUTANEOUS at 00:32

## 2019-08-03 RX ADMIN — HYDROCORTISONE: 1 OINTMENT TOPICAL at 12:48

## 2019-08-03 RX ADMIN — KETOROLAC TROMETHAMINE 30 MG: 30 INJECTION, SOLUTION INTRAMUSCULAR at 03:00

## 2019-08-03 RX ADMIN — CHLORASEPTIC 1 SPRAY: 1.5 LIQUID ORAL at 16:27

## 2019-08-03 RX ADMIN — DIPHENHYDRAMINE HYDROCHLORIDE 25 MG: 50 INJECTION, SOLUTION INTRAMUSCULAR; INTRAVENOUS at 18:58

## 2019-08-03 RX ADMIN — HYDROMORPHONE HYDROCHLORIDE 1 MG: 1 INJECTION, SOLUTION INTRAMUSCULAR; INTRAVENOUS; SUBCUTANEOUS at 20:28

## 2019-08-03 RX ADMIN — DIPHENHYDRAMINE HYDROCHLORIDE 25 MG: 50 INJECTION, SOLUTION INTRAMUSCULAR; INTRAVENOUS at 05:08

## 2019-08-03 RX ADMIN — HYDROMORPHONE HYDROCHLORIDE 1 MG: 1 INJECTION, SOLUTION INTRAMUSCULAR; INTRAVENOUS; SUBCUTANEOUS at 07:08

## 2019-08-03 RX ADMIN — SODIUM CHLORIDE, SODIUM LACTATE, POTASSIUM CHLORIDE, AND CALCIUM CHLORIDE 100 ML/HR: 600; 310; 30; 20 INJECTION, SOLUTION INTRAVENOUS at 05:15

## 2019-08-03 RX ADMIN — DIPHENHYDRAMINE HYDROCHLORIDE 25 MG: 50 INJECTION, SOLUTION INTRAMUSCULAR; INTRAVENOUS at 12:33

## 2019-08-03 RX ADMIN — HYDROCORTISONE: 1 OINTMENT TOPICAL at 18:58

## 2019-08-03 RX ADMIN — ENOXAPARIN SODIUM 40 MG: 40 INJECTION SUBCUTANEOUS at 07:11

## 2019-08-03 RX ADMIN — HYDROMORPHONE HYDROCHLORIDE 1 MG: 1 INJECTION, SOLUTION INTRAMUSCULAR; INTRAVENOUS; SUBCUTANEOUS at 12:34

## 2019-08-03 RX ADMIN — CHLORASEPTIC 1 SPRAY: 1.5 LIQUID ORAL at 15:22

## 2019-08-03 RX ADMIN — CHLORASEPTIC 1 SPRAY: 1.5 LIQUID ORAL at 18:58

## 2019-08-03 RX ADMIN — SODIUM CHLORIDE, SODIUM LACTATE, POTASSIUM CHLORIDE, AND CALCIUM CHLORIDE 1000 ML: 600; 310; 30; 20 INJECTION, SOLUTION INTRAVENOUS at 15:10

## 2019-08-03 RX ADMIN — SODIUM CHLORIDE, SODIUM LACTATE, POTASSIUM CHLORIDE, AND CALCIUM CHLORIDE 100 ML/HR: 600; 310; 30; 20 INJECTION, SOLUTION INTRAVENOUS at 15:10

## 2019-08-03 RX ADMIN — SODIUM CHLORIDE, SODIUM LACTATE, POTASSIUM CHLORIDE, AND CALCIUM CHLORIDE 1000 ML: 600; 310; 30; 20 INJECTION, SOLUTION INTRAVENOUS at 12:40

## 2019-08-03 RX ADMIN — KETOROLAC TROMETHAMINE 30 MG: 30 INJECTION, SOLUTION INTRAMUSCULAR at 09:46

## 2019-08-03 RX ADMIN — CHLORASEPTIC 1 SPRAY: 1.5 LIQUID ORAL at 21:19

## 2019-08-03 RX ADMIN — SODIUM CHLORIDE 40 MG: 9 INJECTION INTRAMUSCULAR; INTRAVENOUS; SUBCUTANEOUS at 09:46

## 2019-08-03 RX ADMIN — HYDROMORPHONE HYDROCHLORIDE 1 MG: 1 INJECTION, SOLUTION INTRAMUSCULAR; INTRAVENOUS; SUBCUTANEOUS at 16:27

## 2019-08-03 RX ADMIN — HYDROCORTISONE: 1 OINTMENT TOPICAL at 21:55

## 2019-08-03 NOTE — PROGRESS NOTES
Assumed care of patient , patient in bed resting, pain medication given prior to shift change. Ng tube to left nare draining dark black liquid. Abd wound open to air. Staple clean, dry and intact. Sbar report received from [address] [place] [address]    2155: Benadryl 25 mg given for itching at patient request.         0023: Dilaudid 1 mg IV was given for pain at patient request.       130: Patient ambulated in hallway. Patient was unable to pass gas. 0710:  Bedside / verbal shift change report given to Lorena Lima  (oncoming nurse) by Mennie Blizzard RN (offgoing nurse). Report included the following information SBAR, Kardex, Intake/Output, MAR and Recent Results.

## 2019-08-03 NOTE — PROGRESS NOTES
Progress Note    Patient: Rebecca Herrera MRN: [id number]  SSN: [id number]    YOB: 1960  Age: 61 y.o.   Sex: female      Admit Date: 8/1/2019    LOS: 2 days     Subjective:   POD # 2  Significant events:   developed tachycardia to 130 bpm   Hct decreased from 30.2 @ 0400 to 25.7 @ 1100, 1900 Hct pending   Feels subjectively better after fluid bolus this AM      Objective:     Vitals:    08/03/19 0811 08/03/19 0940 08/03/19 1320 08/03/19 1535   BP: 125/76  114/74 121/75   Pulse: (!) 133 (!) 128 (!) 124 (!) 118   Resp: 18  18 18   Temp: 98.3 °F (36.8 °C)   98.3 °F (36.8 °C)   SpO2: 93%  92% 97%   Weight:       Height:            Intake and Output:  Current Shift: 08/03 0701 - 08/03 1900  In: -   Out: 175 [Urine:175]  Last three shifts: 08/01 1901 - 08/03 0700  In: 3665 [I.V.:3635]  Out: 1150 [Urine:1100]    Physical Exam:   General: mild discomfort, conversant, A & O X3, nontoxic  Lungs: CTA, EQual,  Cor: tachycardic,RRR without rub, gallop or murmur  Abd: soft, NABS, mildly distended, wounds clean, dry, approximated with appropriate incisional tenderness  Ext: nontender calves      Lab/Data Review:  BMP:   Lab Results   Component Value Date/Time     08/03/2019 04:05 AM    K 4.3 08/03/2019 04:05 AM     08/03/2019 04:05 AM    CO2 27 08/03/2019 04:05 AM    AGAP 9 08/03/2019 04:05 AM     (H) 08/03/2019 04:05 AM    BUN 20 (H) 08/03/2019 04:05 AM    CREA 1.19 08/03/2019 04:05 AM    GFRAA 56 (L) 08/03/2019 04:05 AM    GFRNA 46 (L) 08/03/2019 04:05 AM     CMP:   Lab Results   Component Value Date/Time     08/03/2019 04:05 AM    K 4.3 08/03/2019 04:05 AM     08/03/2019 04:05 AM    CO2 27 08/03/2019 04:05 AM    AGAP 9 08/03/2019 04:05 AM     (H) 08/03/2019 04:05 AM    BUN 20 (H) 08/03/2019 04:05 AM    CREA 1.19 08/03/2019 04:05 AM    GFRAA 56 (L) 08/03/2019 04:05 AM    GFRNA 46 (L) 08/03/2019 04:05 AM    CA 8.5 08/03/2019 04:05 AM     CBC:   Lab Results Component Value Date/Time    WBC 9.3 08/03/2019 11:05 AM    HGB 8.3 (L) 08/03/2019 11:05 AM    HCT 25.7 (L) 08/03/2019 11:05 AM     08/03/2019 11:05 AM     Recent Glucose Results:   Lab Results   Component Value Date/Time     (H) 08/03/2019 04:05 AM     Liver Panel: No results found for: ALB, CBIL, TBIL, TP, GLOB, AGRAT, SGOT, ASTPOC, ALTPOC, ALT, GPT, AP  Pancreatic Markers: No results found for: AMYLPOCT, AML, LIPPOCT, LPSE         Assessment:     Active Problems:    S/P small bowel resection (8/1/2019)    POD #2 s/p RANJITH with post op anemia    Plan:     Suspect pt is not actively bleeding currently  Repeat CBC pending  Serial clinical eval's  Cont NPO, NG, IVF  Repeat labs in AM    Signed By: Elen Morrison DO     August 3, 2019

## 2019-08-03 NOTE — PROGRESS NOTES
Received patient in the bed resting quietly. NGT to LCS suction with scant amount of green drainage. Staples to abd LEANNE sm amt or redness without drainage noted.

## 2019-08-03 NOTE — PROGRESS NOTES
Problem: Pain  Goal: *Control of Pain  Outcome: Progressing Towards Goal     Problem: Falls - Risk of  Goal: *Absence of Falls  Description  Document Zayda Robles Fall Risk and appropriate interventions in the flowsheet.   Outcome: Progressing Towards Goal  Note:   Fall Risk Interventions:  Mobility Interventions: Communicate number of staff needed for ambulation/transfer, Patient to call before getting OOB    Mentation Interventions: Adequate sleep, hydration, pain control, Evaluate medications/consider consulting pharmacy, Increase mobility, Reorient patient    Medication Interventions: Assess postural VS orthostatic hypotension, Evaluate medications/consider consulting pharmacy, Patient to call before getting OOB, Teach patient to arise slowly    Elimination Interventions: Call light in reach, Patient to call for help with toileting needs, Stay With Me (per policy), Toilet paper/wipes in reach    History of Falls Interventions: Evaluate medications/consider consulting pharmacy, Room close to nurse's station         Problem: Surgical Pathway Post-Op Day 2 through Discharge  Goal: Off Pathway (Use only if patient is Off Pathway)  Outcome: Progressing Towards Goal  Goal: Nutrition/Diet  Outcome: Progressing Towards Goal  Goal: Medications  Outcome: Progressing Towards Goal  Goal: Respiratory  Outcome: Progressing Towards Goal  Goal: Treatments/Interventions/Procedures  Outcome: Progressing Towards Goal  Goal: Psychosocial  Outcome: Progressing Towards Goal  Goal: *No signs and symptoms of infection or wound complications  Outcome: Progressing Towards Goal  Goal: *Optimal pain control at patient's stated goal  Outcome: Progressing Towards Goal  Goal: *Adequate urinary output (equal to or greater than 30 milliliters/hour)  Outcome: Progressing Towards Goal  Goal: *Hemodynamically stable  Outcome: Progressing Towards Goal

## 2019-08-03 NOTE — PROGRESS NOTES
Problem: Diabetes Self-Management  Goal: *Disease process and treatment process  Description  Define diabetes and identify own type of diabetes; list 3 options for treating diabetes. Outcome: Progressing Towards Goal  Goal: *Incorporating nutritional management into lifestyle  Description  Describe effect of type, amount and timing of food on blood glucose; list 3 methods for planning meals. Outcome: Progressing Towards Goal  Goal: *Incorporating physical activity into lifestyle  Description  State effect of exercise on blood glucose levels. Outcome: Progressing Towards Goal  Goal: *Developing strategies to promote health/change behavior  Description  Define the ABC's of diabetes; identify appropriate screenings, schedule and personal plan for screenings. Outcome: Progressing Towards Goal  Goal: *Using medications safely  Description  State effect of diabetes medications on diabetes; name diabetes medication taking, action and side effects. Outcome: Progressing Towards Goal  Goal: *Monitoring blood glucose, interpreting and using results  Description  Identify recommended blood glucose targets  and personal targets. Outcome: Progressing Towards Goal  Goal: *Prevention, detection, treatment of acute complications  Description  List symptoms of hyper- and hypoglycemia; describe how to treat low blood sugar and actions for lowering  high blood glucose level. Outcome: Progressing Towards Goal  Goal: *Prevention, detection and treatment of chronic complications  Description  Define the natural course of diabetes and describe the relationship of blood glucose levels to long term complications of diabetes.   Outcome: Progressing Towards Goal  Goal: *Developing strategies to address psychosocial issues  Description  Describe feelings about living with diabetes; identify support needed and support network  Outcome: Progressing Towards Goal  Goal: *Insulin pump training  Outcome: Progressing Towards Goal  Goal: *Sick day guidelines  Outcome: Progressing Towards Goal  Goal: *Patient Specific Goal (EDIT GOAL, INSERT TEXT)  Outcome: Progressing Towards Goal     Problem: Patient Education: Go to Patient Education Activity  Goal: Patient/Family Education  Outcome: Progressing Towards Goal     Problem: Pain  Goal: *Control of Pain  Outcome: Progressing Towards Goal     Problem: Patient Education: Go to Patient Education Activity  Goal: Patient/Family Education  Outcome: Progressing Towards Goal     Problem: Falls - Risk of  Goal: *Absence of Falls  Description  Document Najma Degroot Fall Risk and appropriate interventions in the flowsheet. Outcome: Progressing Towards Goal  Note:   Fall Risk Interventions:  Mobility Interventions: Patient to call before getting OOB    Mentation Interventions: Adequate sleep, hydration, pain control, Door open when patient unattended    Medication Interventions: Evaluate medications/consider consulting pharmacy, Patient to call before getting OOB    Elimination Interventions: Call light in reach, Patient to call for help with toileting needs    History of Falls Interventions: Evaluate medications/consider consulting pharmacy, Room close to nurse's station         Problem: Patient Education: Go to Patient Education Activity  Goal: Patient/Family Education  Outcome: Progressing Towards Goal     Problem: Pressure Injury - Risk of  Goal: *Prevention of pressure injury  Description  Document Cristi Scale and appropriate interventions in the flowsheet.   Outcome: Progressing Towards Goal  Note:   Pressure Injury Interventions:       Moisture Interventions: Absorbent underpads    Activity Interventions: Increase time out of bed    Mobility Interventions: Assess need for specialty bed    Nutrition Interventions: Document food/fluid/supplement intake(NPO)                     Problem: Patient Education: Go to Patient Education Activity  Goal: Patient/Family Education  Outcome: Progressing Towards Goal

## 2019-08-04 LAB
ANION GAP SERPL CALC-SCNC: 8 MMOL/L (ref 3–18)
BUN SERPL-MCNC: 14 MG/DL (ref 7–18)
BUN/CREAT SERPL: 17 (ref 12–20)
CALCIUM SERPL-MCNC: 8.5 MG/DL (ref 8.5–10.1)
CHLORIDE SERPL-SCNC: 106 MMOL/L (ref 100–111)
CO2 SERPL-SCNC: 28 MMOL/L (ref 21–32)
CREAT SERPL-MCNC: 0.82 MG/DL (ref 0.6–1.3)
ERYTHROCYTE [DISTWIDTH] IN BLOOD BY AUTOMATED COUNT: 16.9 % (ref 11.6–14.5)
GLUCOSE BLD STRIP.AUTO-MCNC: 101 MG/DL (ref 70–110)
GLUCOSE BLD STRIP.AUTO-MCNC: 109 MG/DL (ref 70–110)
GLUCOSE SERPL-MCNC: 97 MG/DL (ref 74–99)
HCT VFR BLD AUTO: 24 % (ref 35–45)
HGB BLD-MCNC: 7.5 G/DL (ref 12–16)
MAGNESIUM SERPL-MCNC: 1.9 MG/DL (ref 1.6–2.6)
MCH RBC QN AUTO: 27.4 PG (ref 24–34)
MCHC RBC AUTO-ENTMCNC: 31.3 G/DL (ref 31–37)
MCV RBC AUTO: 87.6 FL (ref 74–97)
PHOSPHATE SERPL-MCNC: 2 MG/DL (ref 2.5–4.9)
PLATELET # BLD AUTO: 238 K/UL (ref 135–420)
PMV BLD AUTO: 9.1 FL (ref 9.2–11.8)
POTASSIUM SERPL-SCNC: 3.8 MMOL/L (ref 3.5–5.5)
RBC # BLD AUTO: 2.74 M/UL (ref 4.2–5.3)
SODIUM SERPL-SCNC: 142 MMOL/L (ref 136–145)
WBC # BLD AUTO: 9.5 K/UL (ref 4.6–13.2)

## 2019-08-04 PROCEDURE — 36415 COLL VENOUS BLD VENIPUNCTURE: CPT

## 2019-08-04 PROCEDURE — 77010033678 HC OXYGEN DAILY

## 2019-08-04 PROCEDURE — 74011250637 HC RX REV CODE- 250/637: Performed by: SURGERY

## 2019-08-04 PROCEDURE — 80048 BASIC METABOLIC PNL TOTAL CA: CPT

## 2019-08-04 PROCEDURE — 84100 ASSAY OF PHOSPHORUS: CPT

## 2019-08-04 PROCEDURE — 74011000250 HC RX REV CODE- 250: Performed by: SURGERY

## 2019-08-04 PROCEDURE — 83735 ASSAY OF MAGNESIUM: CPT

## 2019-08-04 PROCEDURE — 82962 GLUCOSE BLOOD TEST: CPT

## 2019-08-04 PROCEDURE — 74011250636 HC RX REV CODE- 250/636: Performed by: SURGERY

## 2019-08-04 PROCEDURE — 65270000029 HC RM PRIVATE

## 2019-08-04 PROCEDURE — 85027 COMPLETE CBC AUTOMATED: CPT

## 2019-08-04 PROCEDURE — C9113 INJ PANTOPRAZOLE SODIUM, VIA: HCPCS | Performed by: SURGERY

## 2019-08-04 RX ADMIN — DIPHENHYDRAMINE HYDROCHLORIDE 25 MG: 50 INJECTION, SOLUTION INTRAMUSCULAR; INTRAVENOUS at 17:36

## 2019-08-04 RX ADMIN — HYDROMORPHONE HYDROCHLORIDE 1 MG: 1 INJECTION, SOLUTION INTRAMUSCULAR; INTRAVENOUS; SUBCUTANEOUS at 14:03

## 2019-08-04 RX ADMIN — SODIUM CHLORIDE 40 MG: 9 INJECTION INTRAMUSCULAR; INTRAVENOUS; SUBCUTANEOUS at 09:04

## 2019-08-04 RX ADMIN — CHLORASEPTIC 1 SPRAY: 1.5 LIQUID ORAL at 20:52

## 2019-08-04 RX ADMIN — SODIUM CHLORIDE, SODIUM LACTATE, POTASSIUM CHLORIDE, AND CALCIUM CHLORIDE 100 ML/HR: 600; 310; 30; 20 INJECTION, SOLUTION INTRAVENOUS at 20:50

## 2019-08-04 RX ADMIN — HYDROMORPHONE HYDROCHLORIDE 1 MG: 1 INJECTION, SOLUTION INTRAMUSCULAR; INTRAVENOUS; SUBCUTANEOUS at 00:01

## 2019-08-04 RX ADMIN — DIPHENHYDRAMINE HYDROCHLORIDE 25 MG: 50 INJECTION, SOLUTION INTRAMUSCULAR; INTRAVENOUS at 05:49

## 2019-08-04 RX ADMIN — CHLORASEPTIC 1 SPRAY: 1.5 LIQUID ORAL at 00:01

## 2019-08-04 RX ADMIN — HYDROCORTISONE: 1 OINTMENT TOPICAL at 17:22

## 2019-08-04 RX ADMIN — HYDROMORPHONE HYDROCHLORIDE 1 MG: 1 INJECTION, SOLUTION INTRAMUSCULAR; INTRAVENOUS; SUBCUTANEOUS at 18:11

## 2019-08-04 RX ADMIN — SODIUM CHLORIDE, SODIUM LACTATE, POTASSIUM CHLORIDE, AND CALCIUM CHLORIDE 100 ML/HR: 600; 310; 30; 20 INJECTION, SOLUTION INTRAVENOUS at 00:15

## 2019-08-04 RX ADMIN — SODIUM CHLORIDE, SODIUM LACTATE, POTASSIUM CHLORIDE, AND CALCIUM CHLORIDE 100 ML/HR: 600; 310; 30; 20 INJECTION, SOLUTION INTRAVENOUS at 09:49

## 2019-08-04 RX ADMIN — HYDROMORPHONE HYDROCHLORIDE 1 MG: 1 INJECTION, SOLUTION INTRAMUSCULAR; INTRAVENOUS; SUBCUTANEOUS at 09:49

## 2019-08-04 RX ADMIN — DIPHENHYDRAMINE HYDROCHLORIDE 25 MG: 50 INJECTION, SOLUTION INTRAMUSCULAR; INTRAVENOUS at 00:01

## 2019-08-04 RX ADMIN — HYDROMORPHONE HYDROCHLORIDE 1 MG: 1 INJECTION, SOLUTION INTRAMUSCULAR; INTRAVENOUS; SUBCUTANEOUS at 04:23

## 2019-08-04 RX ADMIN — HYDROCORTISONE: 1 OINTMENT TOPICAL at 09:02

## 2019-08-04 RX ADMIN — HYDROMORPHONE HYDROCHLORIDE 1 MG: 1 INJECTION, SOLUTION INTRAMUSCULAR; INTRAVENOUS; SUBCUTANEOUS at 20:51

## 2019-08-04 RX ADMIN — DIPHENHYDRAMINE HYDROCHLORIDE 25 MG: 50 INJECTION, SOLUTION INTRAMUSCULAR; INTRAVENOUS at 11:53

## 2019-08-04 RX ADMIN — CHLORASEPTIC 1 SPRAY: 1.5 LIQUID ORAL at 04:18

## 2019-08-04 NOTE — PROGRESS NOTES
Bedside shift change report given to Ced Tanner (oncoming nurse) by Jorge Alberto Jackson (offgoing nurse). Report included the following information SBAR, Kardex, Intake/Output and MAR.

## 2019-08-04 NOTE — PROGRESS NOTES
0800  Received pt on bed, with NGT on her left nares, no number , no x ray  Documented post NGT  Insertion,check placement , with auscultation, re taped NGT, NPO  Maintained. 1000  Assisted to the bedside commode, voided good amount  SOB on exertion noted,lungs clear, encourage triflo, informed Dr Josseline Loja about pt  having SOB and sore throat from the NGT, result of H and H, no order given. Me and him went to the room, I show him the NGT  How long it is hanging outside, but it is secured with the original tape. Pt has sore throat when she swallows,  No choking sensation, relief from Chloraseptic.    1200  Pain under control  With Dilaudid, had  Been asking for benadryl for itching but no rashes noted. 1400  assisted to the [facility] to void, had been on the recliner earlier for  2 hours and walk in the hallway once with her . 1530  Total  Urine output so far  About 600 cc since 0800,, report given to [facility].

## 2019-08-04 NOTE — PROGRESS NOTES
Received bedside report from [facility]. Patient resting in bed comfortably. Denies any pain, nausea or vomiting at this time. Alerted from day shift that patient is tachycardic,  MD aware received 1000 mL bolus during day shift. MD monitoring labs. Spoke with Dr. Kamron Dias in person, notified him that heart rate was still elevated. No new orders received. Monitoring patient. Patient had uneventful night. Voiding throughout shift, heart rate still elevated but down to 116. Pain well controlled throughout night. No further concerns at this time.

## 2019-08-04 NOTE — PROGRESS NOTES
Progress Note    Patient: Lila Rivera MRN: [id number]  SSN: [id number]    YOB: 1960  Age: 61 y.o. Sex: female      Admit Date: 8/1/2019    LOS: 3 days     Subjective:   POD # 2  Expected incisional pain, ambulatory, voiding spontaneously,  Complains of sore throat from NG, minimal NG output  Small amt of flatus, no BM      Objective:     Vitals:    08/03/19 2018 08/04/19 0010 08/04/19 0420 08/04/19 0806   BP: 144/77 127/81 131/79 120/78   Pulse: (!) 120 (!) 120 (!) 116 (!) 110   Resp: 18 18 18 19   Temp: 98.9 °F (37.2 °C) 99 °F (37.2 °C) 98.4 °F (36.9 °C) 98 °F (36.7 °C)   SpO2: 98% 97% 99%    Weight:       Height:            Intake and Output:  Current Shift: No intake/output data recorded.   Last three shifts: 08/02 1901 - 08/04 0700  In: 78039.7 [I.V.:9986.7]  Out: 1875 [Urine:1325]    Physical Exam:   General: mild discomfort, conversant, A & O X3, nontoxic  Lungs: CTA, EQual,  Cor: RRR without rub, gallop or murmur  Abd: soft, NABS, mildly distended, wounds clean, dry, approximated with appropriate incisional tenderness  Ext: nontender calves      Lab/Data Review:  BMP: No results found for: NA, K, CL, CO2, AGAP, GLU, BUN, CREA, GFRAA, GFRNA  CMP: No results found for: NA, K, CL, CO2, AGAP, GLU, BUN, CREA, GFRAA, GFRNA, CA, MG, PHOS, ALB, TBIL, TP, ALB, GLOB, AGRAT, SGOT, ALT, GPT  CBC:   Lab Results   Component Value Date/Time    WBC 8.4 08/03/2019 04:30 PM    HGB 7.6 (L) 08/03/2019 04:30 PM    HCT 24.0 (L) 08/03/2019 04:30 PM     08/03/2019 04:30 PM     Recent Glucose Results: No results found for: GLU  Liver Panel: No results found for: ALB, CBIL, TBIL, TP, GLOB, AGRAT, SGOT, ASTPOC, ALTPOC, ALT, GPT, AP  Pancreatic Markers: No results found for: AMYLPOCT, AML, LIPPOCT, LPSE         Assessment:     Active Problems:    S/P small bowel resection (8/1/2019)      POD # 3 s/p RANJITH - resolving ileous  Plan:     Continue NPO, NG per Dr Alford Leak  IVF  Aggressive pulm toilet, mobilization  CBC - was not drawn this AM    Signed By: Ricardo Delatorre, DO     August 4, 2019

## 2019-08-04 NOTE — PROGRESS NOTES
Bedside report received from [place] with sbar  ngt to lcspatient still complaining of itching  1750 benadryl given gor itching  1900 patient complaining of painful iv site.  Iv stopped  oob to bsc  Bedside shift report given to REGAN AVELAR [facility] with sbar

## 2019-08-04 NOTE — PROGRESS NOTES
Bedside and Verbal shift change report given to Martir Alston RN (oncoming nurse) by Jenny Mehta RN (offgoing nurse). Report included the following information SBAR, Kardex, OR Summary, Intake/Output, MAR and Recent Results.

## 2019-08-04 NOTE — PROGRESS NOTES
Problem: Diabetes Self-Management  Goal: *Disease process and treatment process  Description  Define diabetes and identify own type of diabetes; list 3 options for treating diabetes. Outcome: Progressing Towards Goal  Goal: *Incorporating nutritional management into lifestyle  Description  Describe effect of type, amount and timing of food on blood glucose; list 3 methods for planning meals. Outcome: Progressing Towards Goal  Goal: *Incorporating physical activity into lifestyle  Description  State effect of exercise on blood glucose levels. Outcome: Progressing Towards Goal  Goal: *Developing strategies to promote health/change behavior  Description  Define the ABC's of diabetes; identify appropriate screenings, schedule and personal plan for screenings. Outcome: Progressing Towards Goal  Goal: *Using medications safely  Description  State effect of diabetes medications on diabetes; name diabetes medication taking, action and side effects. Outcome: Progressing Towards Goal  Goal: *Monitoring blood glucose, interpreting and using results  Description  Identify recommended blood glucose targets  and personal targets. Outcome: Progressing Towards Goal  Goal: *Prevention, detection, treatment of acute complications  Description  List symptoms of hyper- and hypoglycemia; describe how to treat low blood sugar and actions for lowering  high blood glucose level. Outcome: Progressing Towards Goal  Goal: *Prevention, detection and treatment of chronic complications  Description  Define the natural course of diabetes and describe the relationship of blood glucose levels to long term complications of diabetes.   Outcome: Progressing Towards Goal  Goal: *Developing strategies to address psychosocial issues  Description  Describe feelings about living with diabetes; identify support needed and support network  Outcome: Progressing Towards Goal  Goal: *Insulin pump training  Outcome: Progressing Towards Goal  Goal: *Sick day guidelines  Outcome: Progressing Towards Goal  Goal: *Patient Specific Goal (EDIT GOAL, INSERT TEXT)  Outcome: Progressing Towards Goal     Problem: Patient Education: Go to Patient Education Activity  Goal: Patient/Family Education  Outcome: Progressing Towards Goal     Problem: Pain  Goal: *Control of Pain  Outcome: Progressing Towards Goal     Problem: Patient Education: Go to Patient Education Activity  Goal: Patient/Family Education  Outcome: Progressing Towards Goal     Problem: Falls - Risk of  Goal: *Absence of Falls  Description  Document Shawn Simon Fall Risk and appropriate interventions in the flowsheet. Outcome: Progressing Towards Goal  Note:   Fall Risk Interventions:  Mobility Interventions: Patient to call before getting OOB    Mentation Interventions: Adequate sleep, hydration, pain control    Medication Interventions: Patient to call before getting OOB, Teach patient to arise slowly    Elimination Interventions: Call light in reach    History of Falls Interventions: Evaluate medications/consider consulting pharmacy, Room close to nurse's station         Problem: Patient Education: Go to Patient Education Activity  Goal: Patient/Family Education  Outcome: Progressing Towards Goal     Problem: Pressure Injury - Risk of  Goal: *Prevention of pressure injury  Description  Document Cristi Scale and appropriate interventions in the flowsheet.   Outcome: Progressing Towards Goal  Note:   Pressure Injury Interventions:  Sensory Interventions: Assess changes in LOC    Moisture Interventions: Absorbent underpads    Activity Interventions: Increase time out of bed    Mobility Interventions: HOB 30 degrees or less    Nutrition Interventions: Document food/fluid/supplement intake                     Problem: Patient Education: Go to Patient Education Activity  Goal: Patient/Family Education  Outcome: Progressing Towards Goal     Problem: Surgical Pathway Post-Op Day 2 through Discharge  Goal: Off Pathway (Use only if patient is Off Pathway)  Outcome: Progressing Towards Goal  Goal: Activity/Safety  Outcome: Progressing Towards Goal  Goal: Nutrition/Diet  Outcome: Progressing Towards Goal  Goal: Discharge Planning  Outcome: Progressing Towards Goal  Goal: Medications  Outcome: Progressing Towards Goal  Goal: Respiratory  Outcome: Progressing Towards Goal  Goal: Treatments/Interventions/Procedures  Outcome: Progressing Towards Goal  Goal: Psychosocial  Outcome: Progressing Towards Goal  Goal: *No signs and symptoms of infection or wound complications  Outcome: Progressing Towards Goal  Goal: *Optimal pain control at patient's stated goal  Outcome: Progressing Towards Goal  Goal: *Adequate urinary output (equal to or greater than 30 milliliters/hour)  Outcome: Progressing Towards Goal  Goal: *Hemodynamically stable  Outcome: Progressing Towards Goal  Goal: *Tolerating diet  Outcome: Progressing Towards Goal  Goal: *Demonstrates progressive activity  Outcome: Progressing Towards Goal  Goal: *Lungs clear or at baseline  Outcome: Progressing Towards Goal     Problem: Surgical Pathway: Discharge Outcomes  Goal: *Hemodynamically stable  Outcome: Progressing Towards Goal  Goal: *Lungs clear or at baseline  Outcome: Progressing Towards Goal  Goal: *Demonstrates independent activity or return to baseline  Outcome: Progressing Towards Goal  Goal: *Optimal pain control at patient's stated goal  Outcome: Progressing Towards Goal  Goal: *Verbalizes understanding and describes prescribed diet  Outcome: Progressing Towards Goal  Goal: *Tolerating diet  Outcome: Progressing Towards Goal  Goal: *Verbalizes name, dosage, time, side effects, and number of days to continue medications  Outcome: Progressing Towards Goal  Goal: *No signs and symptoms of infection or wound complications  Outcome: Progressing Towards Goal  Goal: *Anxiety reduced or absent  Outcome: Progressing Towards Goal  Goal: *Understands and describes signs and symptoms to report to providers(Stroke Metric)  Outcome: Progressing Towards Goal  Goal: *Describes follow-up/return visits to physicians  Outcome: Progressing Towards Goal  Goal: *Describes available resources and support systems  Outcome: Progressing Towards Goal     Problem: Infection - Risk of, Surgical Site Infection  Goal: *Absence of surgical site infection signs and symptoms  Outcome: Progressing Towards Goal     Problem: Patient Education: Go to Patient Education Activity  Goal: Patient/Family Education  Outcome: Progressing Towards Goal

## 2019-08-05 LAB
ERYTHROCYTE [DISTWIDTH] IN BLOOD BY AUTOMATED COUNT: 16.8 % (ref 11.6–14.5)
GLUCOSE BLD STRIP.AUTO-MCNC: 87 MG/DL (ref 70–110)
GLUCOSE BLD STRIP.AUTO-MCNC: 93 MG/DL (ref 70–110)
GLUCOSE BLD STRIP.AUTO-MCNC: 93 MG/DL (ref 70–110)
GLUCOSE BLD STRIP.AUTO-MCNC: 95 MG/DL (ref 70–110)
HCT VFR BLD AUTO: 23.5 % (ref 35–45)
HGB BLD-MCNC: 7.4 G/DL (ref 12–16)
MCH RBC QN AUTO: 27.1 PG (ref 24–34)
MCHC RBC AUTO-ENTMCNC: 31.5 G/DL (ref 31–37)
MCV RBC AUTO: 86.1 FL (ref 74–97)
PLATELET # BLD AUTO: 296 K/UL (ref 135–420)
PMV BLD AUTO: 10 FL (ref 9.2–11.8)
RBC # BLD AUTO: 2.73 M/UL (ref 4.2–5.3)
WBC # BLD AUTO: 8.7 K/UL (ref 4.6–13.2)

## 2019-08-05 PROCEDURE — 74011250636 HC RX REV CODE- 250/636: Performed by: SURGERY

## 2019-08-05 PROCEDURE — 82962 GLUCOSE BLOOD TEST: CPT

## 2019-08-05 PROCEDURE — 36415 COLL VENOUS BLD VENIPUNCTURE: CPT

## 2019-08-05 PROCEDURE — C9113 INJ PANTOPRAZOLE SODIUM, VIA: HCPCS | Performed by: SURGERY

## 2019-08-05 PROCEDURE — 85027 COMPLETE CBC AUTOMATED: CPT

## 2019-08-05 PROCEDURE — 74011000250 HC RX REV CODE- 250: Performed by: SURGERY

## 2019-08-05 PROCEDURE — 65270000029 HC RM PRIVATE

## 2019-08-05 PROCEDURE — 74011250637 HC RX REV CODE- 250/637: Performed by: SURGERY

## 2019-08-05 RX ADMIN — DIPHENHYDRAMINE HYDROCHLORIDE 25 MG: 50 INJECTION, SOLUTION INTRAMUSCULAR; INTRAVENOUS at 05:53

## 2019-08-05 RX ADMIN — HYDROMORPHONE HYDROCHLORIDE 1 MG: 1 INJECTION, SOLUTION INTRAMUSCULAR; INTRAVENOUS; SUBCUTANEOUS at 00:13

## 2019-08-05 RX ADMIN — HYDROMORPHONE HYDROCHLORIDE 1 MG: 1 INJECTION, SOLUTION INTRAMUSCULAR; INTRAVENOUS; SUBCUTANEOUS at 20:45

## 2019-08-05 RX ADMIN — CHLORASEPTIC 1 SPRAY: 1.5 LIQUID ORAL at 00:12

## 2019-08-05 RX ADMIN — SODIUM CHLORIDE, SODIUM LACTATE, POTASSIUM CHLORIDE, AND CALCIUM CHLORIDE 100 ML/HR: 600; 310; 30; 20 INJECTION, SOLUTION INTRAVENOUS at 13:16

## 2019-08-05 RX ADMIN — SODIUM CHLORIDE 40 MG: 9 INJECTION INTRAMUSCULAR; INTRAVENOUS; SUBCUTANEOUS at 09:03

## 2019-08-05 RX ADMIN — DIPHENHYDRAMINE HYDROCHLORIDE 25 MG: 50 INJECTION, SOLUTION INTRAMUSCULAR; INTRAVENOUS at 00:13

## 2019-08-05 RX ADMIN — CHLORASEPTIC 1 SPRAY: 1.5 LIQUID ORAL at 05:53

## 2019-08-05 RX ADMIN — ONDANSETRON 8 MG: 2 INJECTION INTRAMUSCULAR; INTRAVENOUS at 19:03

## 2019-08-05 RX ADMIN — SODIUM CHLORIDE, SODIUM LACTATE, POTASSIUM CHLORIDE, AND CALCIUM CHLORIDE 100 ML/HR: 600; 310; 30; 20 INJECTION, SOLUTION INTRAVENOUS at 15:06

## 2019-08-05 RX ADMIN — HYDROMORPHONE HYDROCHLORIDE 1 MG: 1 INJECTION, SOLUTION INTRAMUSCULAR; INTRAVENOUS; SUBCUTANEOUS at 04:16

## 2019-08-05 RX ADMIN — HYDROMORPHONE HYDROCHLORIDE 1 MG: 1 INJECTION, SOLUTION INTRAMUSCULAR; INTRAVENOUS; SUBCUTANEOUS at 08:52

## 2019-08-05 RX ADMIN — DIPHENHYDRAMINE HYDROCHLORIDE 25 MG: 50 INJECTION, SOLUTION INTRAMUSCULAR; INTRAVENOUS at 18:02

## 2019-08-05 RX ADMIN — HYDROMORPHONE HYDROCHLORIDE 1 MG: 1 INJECTION, SOLUTION INTRAMUSCULAR; INTRAVENOUS; SUBCUTANEOUS at 16:29

## 2019-08-05 RX ADMIN — DIPHENHYDRAMINE HYDROCHLORIDE 25 MG: 50 INJECTION, SOLUTION INTRAMUSCULAR; INTRAVENOUS at 11:24

## 2019-08-05 RX ADMIN — HYDROMORPHONE HYDROCHLORIDE 1 MG: 1 INJECTION, SOLUTION INTRAMUSCULAR; INTRAVENOUS; SUBCUTANEOUS at 13:06

## 2019-08-05 RX ADMIN — CHLORASEPTIC 1 SPRAY: 1.5 LIQUID ORAL at 03:21

## 2019-08-05 RX ADMIN — HYDROCORTISONE: 1 OINTMENT TOPICAL at 18:02

## 2019-08-05 RX ADMIN — HYDROCORTISONE: 1 OINTMENT TOPICAL at 10:15

## 2019-08-05 RX ADMIN — ONDANSETRON 8 MG: 2 INJECTION INTRAMUSCULAR; INTRAVENOUS at 13:17

## 2019-08-05 RX ADMIN — SODIUM CHLORIDE, SODIUM LACTATE, POTASSIUM CHLORIDE, AND CALCIUM CHLORIDE 100 ML/HR: 600; 310; 30; 20 INJECTION, SOLUTION INTRAVENOUS at 05:04

## 2019-08-05 NOTE — PROGRESS NOTES
Bedside shift report received from [facility] with sbar  Dr. Rodriguez Troy in to see patient  dilaidid given for pain  oob walking  1200 benadryl give for itching  1315 dilaudid given for pain  1315 zofran given for nausea  Nausea improved  1800 benadryl for itching  1900 zofran for nause  Bedside shift report given to Fiorella Jacobson with sbar

## 2019-08-05 NOTE — PROGRESS NOTES
Problem: Diabetes Self-Management  Goal: *Disease process and treatment process  Description  Define diabetes and identify own type of diabetes; list 3 options for treating diabetes. Outcome: Progressing Towards Goal  Goal: *Incorporating nutritional management into lifestyle  Description  Describe effect of type, amount and timing of food on blood glucose; list 3 methods for planning meals. Outcome: Progressing Towards Goal  Goal: *Incorporating physical activity into lifestyle  Description  State effect of exercise on blood glucose levels. Outcome: Progressing Towards Goal  Goal: *Developing strategies to promote health/change behavior  Description  Define the ABC's of diabetes; identify appropriate screenings, schedule and personal plan for screenings. Outcome: Progressing Towards Goal  Goal: *Using medications safely  Description  State effect of diabetes medications on diabetes; name diabetes medication taking, action and side effects. Outcome: Progressing Towards Goal  Goal: *Monitoring blood glucose, interpreting and using results  Description  Identify recommended blood glucose targets  and personal targets. Outcome: Progressing Towards Goal  Goal: *Prevention, detection, treatment of acute complications  Description  List symptoms of hyper- and hypoglycemia; describe how to treat low blood sugar and actions for lowering  high blood glucose level. Outcome: Progressing Towards Goal  Goal: *Prevention, detection and treatment of chronic complications  Description  Define the natural course of diabetes and describe the relationship of blood glucose levels to long term complications of diabetes.   Outcome: Progressing Towards Goal  Goal: *Developing strategies to address psychosocial issues  Description  Describe feelings about living with diabetes; identify support needed and support network  Outcome: Progressing Towards Goal  Goal: *Insulin pump training  Outcome: Progressing Towards Goal  Goal: *Sick day guidelines  Outcome: Progressing Towards Goal  Goal: *Patient Specific Goal (EDIT GOAL, INSERT TEXT)  Outcome: Progressing Towards Goal     Problem: Patient Education: Go to Patient Education Activity  Goal: Patient/Family Education  Outcome: Progressing Towards Goal     Problem: Pain  Goal: *Control of Pain  Outcome: Progressing Towards Goal     Problem: Patient Education: Go to Patient Education Activity  Goal: Patient/Family Education  Outcome: Progressing Towards Goal     Problem: Falls - Risk of  Goal: *Absence of Falls  Description  Document Seb  Fall Risk and appropriate interventions in the flowsheet. Outcome: Progressing Towards Goal  Note:   Fall Risk Interventions:  Mobility Interventions: Patient to call before getting OOB    Mentation Interventions: Adequate sleep, hydration, pain control    Medication Interventions: Patient to call before getting OOB, Teach patient to arise slowly    Elimination Interventions: Call light in reach, Patient to call for help with toileting needs    History of Falls Interventions: Door open when patient unattended         Problem: Patient Education: Go to Patient Education Activity  Goal: Patient/Family Education  Outcome: Progressing Towards Goal     Problem: Pressure Injury - Risk of  Goal: *Prevention of pressure injury  Description  Document Cristi Scale and appropriate interventions in the flowsheet.   Outcome: Progressing Towards Goal  Note:   Pressure Injury Interventions:  Sensory Interventions: Assess changes in LOC    Moisture Interventions: Absorbent underpads    Activity Interventions: Increase time out of bed    Mobility Interventions: HOB 30 degrees or less    Nutrition Interventions: Document food/fluid/supplement intake                     Problem: Patient Education: Go to Patient Education Activity  Goal: Patient/Family Education  Outcome: Progressing Towards Goal     Problem: Surgical Pathway Post-Op Day 2 through Discharge  Goal: Off Pathway (Use only if patient is Off Pathway)  Outcome: Progressing Towards Goal  Goal: Activity/Safety  Outcome: Progressing Towards Goal  Goal: Nutrition/Diet  Outcome: Progressing Towards Goal  Goal: Discharge Planning  Outcome: Progressing Towards Goal  Goal: Medications  Outcome: Progressing Towards Goal  Goal: Respiratory  Outcome: Progressing Towards Goal  Goal: Treatments/Interventions/Procedures  Outcome: Progressing Towards Goal  Goal: Psychosocial  Outcome: Progressing Towards Goal  Goal: *No signs and symptoms of infection or wound complications  Outcome: Progressing Towards Goal  Goal: *Optimal pain control at patient's stated goal  Outcome: Progressing Towards Goal  Goal: *Adequate urinary output (equal to or greater than 30 milliliters/hour)  Outcome: Progressing Towards Goal  Goal: *Hemodynamically stable  Outcome: Progressing Towards Goal  Goal: *Tolerating diet  Outcome: Progressing Towards Goal  Goal: *Demonstrates progressive activity  Outcome: Progressing Towards Goal  Goal: *Lungs clear or at baseline  Outcome: Progressing Towards Goal     Problem: Surgical Pathway: Discharge Outcomes  Goal: *Hemodynamically stable  Outcome: Progressing Towards Goal  Goal: *Lungs clear or at baseline  Outcome: Progressing Towards Goal  Goal: *Demonstrates independent activity or return to baseline  Outcome: Progressing Towards Goal  Goal: *Optimal pain control at patient's stated goal  Outcome: Progressing Towards Goal  Goal: *Verbalizes understanding and describes prescribed diet  Outcome: Progressing Towards Goal  Goal: *Tolerating diet  Outcome: Progressing Towards Goal  Goal: *Verbalizes name, dosage, time, side effects, and number of days to continue medications  Outcome: Progressing Towards Goal  Goal: *No signs and symptoms of infection or wound complications  Outcome: Progressing Towards Goal  Goal: *Anxiety reduced or absent  Outcome: Progressing Towards Goal  Goal: *Understands and describes signs and symptoms to report to providers(Stroke Metric)  Outcome: Progressing Towards Goal  Goal: *Describes follow-up/return visits to physicians  Outcome: Progressing Towards Goal  Goal: *Describes available resources and support systems  Outcome: Progressing Towards Goal     Problem: Infection - Risk of, Surgical Site Infection  Goal: *Absence of surgical site infection signs and symptoms  Outcome: Progressing Towards Goal     Problem: Patient Education: Go to Patient Education Activity  Goal: Patient/Family Education  Outcome: Progressing Towards Goal

## 2019-08-05 NOTE — PROGRESS NOTES
Received bedside report from [facility]. Patient up to bedside commode. Complaining of pain of 10. Gave dilaudid as ordered. Patient denies any nausea or vomiting at this time. Patient up to bedside commode twice throughout shift. With pain well controlled. Patient had uneventful night. No further concerns at this time.

## 2019-08-05 NOTE — PROGRESS NOTES
Bedside shift change report given to [address] [place] [address] (oncoming nurse) by Lori Soto (offgoing nurse). Report included the following information SBAR, Kardex, Intake/Output and MAR.

## 2019-08-06 LAB
BASOPHILS # BLD: 0 K/UL (ref 0–0.1)
BASOPHILS NFR BLD: 0 % (ref 0–2)
DIFFERENTIAL METHOD BLD: ABNORMAL
EOSINOPHIL # BLD: 0.2 K/UL (ref 0–0.4)
EOSINOPHIL NFR BLD: 2 % (ref 0–5)
ERYTHROCYTE [DISTWIDTH] IN BLOOD BY AUTOMATED COUNT: 16.6 % (ref 11.6–14.5)
GLUCOSE BLD STRIP.AUTO-MCNC: 100 MG/DL (ref 70–110)
GLUCOSE BLD STRIP.AUTO-MCNC: 84 MG/DL (ref 70–110)
GLUCOSE BLD STRIP.AUTO-MCNC: 85 MG/DL (ref 70–110)
GLUCOSE BLD STRIP.AUTO-MCNC: 96 MG/DL (ref 70–110)
HCT VFR BLD AUTO: 26.5 % (ref 35–45)
HGB BLD-MCNC: 7.9 G/DL (ref 12–16)
LYMPHOCYTES # BLD: 1.1 K/UL (ref 0.9–3.6)
LYMPHOCYTES NFR BLD: 11 % (ref 21–52)
MCH RBC QN AUTO: 26.2 PG (ref 24–34)
MCHC RBC AUTO-ENTMCNC: 29.8 G/DL (ref 31–37)
MCV RBC AUTO: 87.7 FL (ref 74–97)
MONOCYTES # BLD: 1 K/UL (ref 0.05–1.2)
MONOCYTES NFR BLD: 10 % (ref 3–10)
NEUTS SEG # BLD: 7.6 K/UL (ref 1.8–8)
NEUTS SEG NFR BLD: 77 % (ref 40–73)
PLATELET # BLD AUTO: 309 K/UL (ref 135–420)
PMV BLD AUTO: 9.7 FL (ref 9.2–11.8)
RBC # BLD AUTO: 3.02 M/UL (ref 4.2–5.3)
WBC # BLD AUTO: 9.9 K/UL (ref 4.6–13.2)

## 2019-08-06 PROCEDURE — C9113 INJ PANTOPRAZOLE SODIUM, VIA: HCPCS | Performed by: SURGERY

## 2019-08-06 PROCEDURE — 65270000029 HC RM PRIVATE

## 2019-08-06 PROCEDURE — 74011250636 HC RX REV CODE- 250/636: Performed by: SURGERY

## 2019-08-06 PROCEDURE — 74011250637 HC RX REV CODE- 250/637: Performed by: SURGERY

## 2019-08-06 PROCEDURE — 74011000250 HC RX REV CODE- 250: Performed by: SURGERY

## 2019-08-06 PROCEDURE — 85025 COMPLETE CBC W/AUTO DIFF WBC: CPT

## 2019-08-06 PROCEDURE — 82962 GLUCOSE BLOOD TEST: CPT

## 2019-08-06 PROCEDURE — 36415 COLL VENOUS BLD VENIPUNCTURE: CPT

## 2019-08-06 PROCEDURE — 77030027138 HC INCENT SPIROMETER -A

## 2019-08-06 RX ORDER — OXYCODONE AND ACETAMINOPHEN 5; 325 MG/1; MG/1
1 TABLET ORAL
Status: DISCONTINUED | OUTPATIENT
Start: 2019-08-06 | End: 2019-08-09 | Stop reason: HOSPADM

## 2019-08-06 RX ORDER — HYDROMORPHONE HYDROCHLORIDE 1 MG/ML
1 INJECTION, SOLUTION INTRAMUSCULAR; INTRAVENOUS; SUBCUTANEOUS EVERY 4 HOURS
Status: DISCONTINUED | OUTPATIENT
Start: 2019-08-06 | End: 2019-08-07

## 2019-08-06 RX ORDER — DOCUSATE SODIUM 100 MG/1
100 CAPSULE, LIQUID FILLED ORAL 2 TIMES DAILY
Status: DISCONTINUED | OUTPATIENT
Start: 2019-08-06 | End: 2019-08-09 | Stop reason: HOSPADM

## 2019-08-06 RX ORDER — KETOROLAC TROMETHAMINE 30 MG/ML
30 INJECTION, SOLUTION INTRAMUSCULAR; INTRAVENOUS EVERY 6 HOURS
Status: DISCONTINUED | OUTPATIENT
Start: 2019-08-06 | End: 2019-08-08

## 2019-08-06 RX ORDER — ACETAMINOPHEN 650 MG/1
650 SUPPOSITORY RECTAL
Status: DISCONTINUED | OUTPATIENT
Start: 2019-08-06 | End: 2019-08-09 | Stop reason: HOSPADM

## 2019-08-06 RX ADMIN — HYDROMORPHONE HYDROCHLORIDE 1 MG: 1 INJECTION, SOLUTION INTRAMUSCULAR; INTRAVENOUS; SUBCUTANEOUS at 16:56

## 2019-08-06 RX ADMIN — HYDROMORPHONE HYDROCHLORIDE 1 MG: 1 INJECTION, SOLUTION INTRAMUSCULAR; INTRAVENOUS; SUBCUTANEOUS at 06:42

## 2019-08-06 RX ADMIN — HYDROCORTISONE: 1 OINTMENT TOPICAL at 12:45

## 2019-08-06 RX ADMIN — DIPHENHYDRAMINE HYDROCHLORIDE 25 MG: 50 INJECTION, SOLUTION INTRAMUSCULAR; INTRAVENOUS at 09:22

## 2019-08-06 RX ADMIN — KETOROLAC TROMETHAMINE 30 MG: 30 INJECTION, SOLUTION INTRAMUSCULAR at 09:22

## 2019-08-06 RX ADMIN — HYDROMORPHONE HYDROCHLORIDE 1 MG: 1 INJECTION, SOLUTION INTRAMUSCULAR; INTRAVENOUS; SUBCUTANEOUS at 20:26

## 2019-08-06 RX ADMIN — HYDROCORTISONE: 1 OINTMENT TOPICAL at 00:20

## 2019-08-06 RX ADMIN — ONDANSETRON 8 MG: 2 INJECTION INTRAMUSCULAR; INTRAVENOUS at 15:15

## 2019-08-06 RX ADMIN — HYDROMORPHONE HYDROCHLORIDE 1 MG: 1 INJECTION, SOLUTION INTRAMUSCULAR; INTRAVENOUS; SUBCUTANEOUS at 12:20

## 2019-08-06 RX ADMIN — ACETAMINOPHEN 650 MG: 650 SUPPOSITORY RECTAL at 02:03

## 2019-08-06 RX ADMIN — HYDROCORTISONE: 1 OINTMENT TOPICAL at 17:30

## 2019-08-06 RX ADMIN — DOCUSATE SODIUM 100 MG: 100 CAPSULE, LIQUID FILLED ORAL at 19:21

## 2019-08-06 RX ADMIN — KETOROLAC TROMETHAMINE 30 MG: 30 INJECTION, SOLUTION INTRAMUSCULAR at 21:17

## 2019-08-06 RX ADMIN — SODIUM CHLORIDE, SODIUM LACTATE, POTASSIUM CHLORIDE, AND CALCIUM CHLORIDE 100 ML/HR: 600; 310; 30; 20 INJECTION, SOLUTION INTRAVENOUS at 13:25

## 2019-08-06 RX ADMIN — DOCUSATE SODIUM 100 MG: 100 CAPSULE, LIQUID FILLED ORAL at 09:21

## 2019-08-06 RX ADMIN — SODIUM CHLORIDE 40 MG: 9 INJECTION INTRAMUSCULAR; INTRAVENOUS; SUBCUTANEOUS at 09:22

## 2019-08-06 RX ADMIN — HYDROMORPHONE HYDROCHLORIDE 1 MG: 1 INJECTION, SOLUTION INTRAMUSCULAR; INTRAVENOUS; SUBCUTANEOUS at 02:03

## 2019-08-06 RX ADMIN — DIPHENHYDRAMINE HYDROCHLORIDE 25 MG: 50 INJECTION, SOLUTION INTRAMUSCULAR; INTRAVENOUS at 00:11

## 2019-08-06 RX ADMIN — KETOROLAC TROMETHAMINE 30 MG: 30 INJECTION, SOLUTION INTRAMUSCULAR at 15:15

## 2019-08-06 RX ADMIN — SODIUM CHLORIDE, SODIUM LACTATE, POTASSIUM CHLORIDE, AND CALCIUM CHLORIDE 100 ML/HR: 600; 310; 30; 20 INJECTION, SOLUTION INTRAVENOUS at 00:20

## 2019-08-06 NOTE — CDMP QUERY
Pt S/P  RANJITH/Small bowel resection /Pt noted to have Anemia. If possible, please document in the progress notes and d/c summary if you are evaluating and / or treating any of the following: ? Anemia due to acute blood loss ? Anemia due to chronic blood loss ? Anemia due to iron deficiency ? Anemia due to postoperative blood loss (please specify if expected or a complication of the surgery) ? Anemia due to chronic disease, please specify disease ? Dilutional anemia 
? Other, please specify ? Clinically unable to determine The medical record reflects the following: 
   Risk Factors: surgery Clinical Indicators: H&H 7.6/24, 7.5/24, 7.24/23, 7.9/26.5 Treatment: IV fluids Thank-You Siena Elias RN [address] [place] [address] [phone]

## 2019-08-06 NOTE — PROGRESS NOTES
attempted to  Complete a  follow up visit with patient in room 2205 this morning but found her resting peacefully at present. and unable to communicate now Chaplains will continue to follow and will provide pastoral care on an as needed/requested basis    Chaplain Gianfranco Javier   Board Certified [address] [place] [address]   [phone]

## 2019-08-06 NOTE — PROGRESS NOTES
Problem: Diabetes Self-Management  Goal: *Disease process and treatment process  Description  Define diabetes and identify own type of diabetes; list 3 options for treating diabetes. Outcome: Progressing Towards Goal  Goal: *Incorporating nutritional management into lifestyle  Description  Describe effect of type, amount and timing of food on blood glucose; list 3 methods for planning meals. Outcome: Progressing Towards Goal  Goal: *Incorporating physical activity into lifestyle  Description  State effect of exercise on blood glucose levels. Outcome: Progressing Towards Goal  Goal: *Developing strategies to promote health/change behavior  Description  Define the ABC's of diabetes; identify appropriate screenings, schedule and personal plan for screenings. Outcome: Progressing Towards Goal  Goal: *Using medications safely  Description  State effect of diabetes medications on diabetes; name diabetes medication taking, action and side effects. Outcome: Progressing Towards Goal  Goal: *Monitoring blood glucose, interpreting and using results  Description  Identify recommended blood glucose targets  and personal targets. Outcome: Progressing Towards Goal  Goal: *Prevention, detection, treatment of acute complications  Description  List symptoms of hyper- and hypoglycemia; describe how to treat low blood sugar and actions for lowering  high blood glucose level. Outcome: Progressing Towards Goal  Goal: *Prevention, detection and treatment of chronic complications  Description  Define the natural course of diabetes and describe the relationship of blood glucose levels to long term complications of diabetes.   Outcome: Progressing Towards Goal  Goal: *Developing strategies to address psychosocial issues  Description  Describe feelings about living with diabetes; identify support needed and support network  Outcome: Progressing Towards Goal  Goal: *Insulin pump training  Outcome: Progressing Towards Goal  Goal: *Sick day guidelines  Outcome: Progressing Towards Goal  Goal: *Patient Specific Goal (EDIT GOAL, INSERT TEXT)  Outcome: Progressing Towards Goal     Problem: Patient Education: Go to Patient Education Activity  Goal: Patient/Family Education  Outcome: Progressing Towards Goal     Problem: Pain  Goal: *Control of Pain  Outcome: Progressing Towards Goal     Problem: Patient Education: Go to Patient Education Activity  Goal: Patient/Family Education  Outcome: Progressing Towards Goal     Problem: Falls - Risk of  Goal: *Absence of Falls  Description  Document Madisyn Cousin Fall Risk and appropriate interventions in the flowsheet. Outcome: Progressing Towards Goal  Note:   Fall Risk Interventions:  Mobility Interventions: Patient to call before getting OOB    Mentation Interventions: Bed/chair exit alarm, Door open when patient unattended    Medication Interventions: Patient to call before getting OOB, Teach patient to arise slowly    Elimination Interventions: Patient to call for help with toileting needs    History of Falls Interventions: Bed/chair exit alarm, Room close to nurse's station         Problem: Patient Education: Go to Patient Education Activity  Goal: Patient/Family Education  Outcome: Progressing Towards Goal     Problem: Pressure Injury - Risk of  Goal: *Prevention of pressure injury  Description  Document Cristi Scale and appropriate interventions in the flowsheet.   Outcome: Progressing Towards Goal  Note:   Pressure Injury Interventions:  Sensory Interventions: Assess changes in LOC    Moisture Interventions: Absorbent underpads, Maintain skin hydration (lotion/cream)    Activity Interventions: Increase time out of bed, Pressure redistribution bed/mattress(bed type)    Mobility Interventions: HOB 30 degrees or less, Pressure redistribution bed/mattress (bed type)    Nutrition Interventions: Document food/fluid/supplement intake    Friction and Shear Interventions: HOB 30 degrees or less                Problem: Patient Education: Go to Patient Education Activity  Goal: Patient/Family Education  Outcome: Progressing Towards Goal     Problem: Surgical Pathway Post-Op Day 2 through Discharge  Goal: Off Pathway (Use only if patient is Off Pathway)  Outcome: Progressing Towards Goal  Goal: Activity/Safety  Outcome: Progressing Towards Goal  Goal: Nutrition/Diet  Outcome: Progressing Towards Goal  Goal: Discharge Planning  Outcome: Progressing Towards Goal  Goal: Medications  Outcome: Progressing Towards Goal  Goal: Respiratory  Outcome: Progressing Towards Goal  Goal: Treatments/Interventions/Procedures  Outcome: Progressing Towards Goal  Goal: Psychosocial  Outcome: Progressing Towards Goal  Goal: *No signs and symptoms of infection or wound complications  Outcome: Progressing Towards Goal  Goal: *Optimal pain control at patient's stated goal  Outcome: Progressing Towards Goal  Goal: *Adequate urinary output (equal to or greater than 30 milliliters/hour)  Outcome: Progressing Towards Goal  Goal: *Hemodynamically stable  Outcome: Progressing Towards Goal  Goal: *Tolerating diet  Outcome: Progressing Towards Goal  Goal: *Demonstrates progressive activity  Outcome: Progressing Towards Goal  Goal: *Lungs clear or at baseline  Outcome: Progressing Towards Goal     Problem: Surgical Pathway: Discharge Outcomes  Goal: *Hemodynamically stable  Outcome: Progressing Towards Goal  Goal: *Lungs clear or at baseline  Outcome: Progressing Towards Goal  Goal: *Demonstrates independent activity or return to baseline  Outcome: Progressing Towards Goal  Goal: *Optimal pain control at patient's stated goal  Outcome: Progressing Towards Goal  Goal: *Verbalizes understanding and describes prescribed diet  Outcome: Progressing Towards Goal  Goal: *Tolerating diet  Outcome: Progressing Towards Goal  Goal: *Verbalizes name, dosage, time, side effects, and number of days to continue medications  Outcome: Progressing Towards Goal  Goal: *No signs and symptoms of infection or wound complications  Outcome: Progressing Towards Goal  Goal: *Anxiety reduced or absent  Outcome: Progressing Towards Goal  Goal: *Understands and describes signs and symptoms to report to providers(Stroke Metric)  Outcome: Progressing Towards Goal  Goal: *Describes follow-up/return visits to physicians  Outcome: Progressing Towards Goal  Goal: *Describes available resources and support systems  Outcome: Progressing Towards Goal     Problem: Infection - Risk of, Surgical Site Infection  Goal: *Absence of surgical site infection signs and symptoms  Outcome: Progressing Towards Goal     Problem: Patient Education: Go to Patient Education Activity  Goal: Patient/Family Education  Outcome: Progressing Towards Goal

## 2019-08-06 NOTE — DIABETES MGMT
NUTRITIONAL ASSESSMENT GLYCEMIC CONTROL/ PLAN OF CARE     Arlet Damian           61 y.o.           8/1/2019                 1. S/P small bowel resection    T2DM   INTERVENTIONS/PLAN:   1. Monitor diet advancement, labs, glycemic control and weights. 2. On-going diabetes education. ASSESSMENT:   Nutritional Status:  Pt is 129% ideal wt; pt appears well nourished at this time. Nutrition Diagnoses:   Inadequate oral food and beverage intake due to s/p small bowel resection as evidenced by NPO orders. Altered nutrition related labs due to T2DM as evidenced by A1C of 7.8%. Overweight due to excess energy intake PTA as evidenced by BMI of 26.5 kg/m2. Diabetes Management:     Recent blood glucose:    Lab Results   Component Value Date/Time    Glucose 97 08/04/2019 10:10 AM    Glucose (POC) 100 08/06/2019 06:40 AM    Glucose,  (H) 02/21/2017 01:32 PM      Within target range (non-ICU: <140; ICU<180): [] Yes   []  No    Current Insulin regimen:   Corrective lispro, normal insulin sensitivity 4 times daily  Home medication/insulin regimen:   Metformin 500 mg BID  Farxiga 10 mg/day  HbA1c:  7.8% - ave BG has been ~ 177 mg/dL over past 3 months. Adequate glycemic control PTA:  [] Yes  [x] No       SUBJECTIVE/OBJECTIVE:   Information obtained from: chart review, pt  Pt is s/p small bowel resection 8/1/19. Noted NGT discontinued 8/6.  8/6:  Pt reports nausea yesterday but it has improved today. She state her weight was stable PTA. No known food allergies and pt denies issues with chewing or swallowing. She states she takes metformin and Farxiga at home. She has a glucometer and an ample supply of test strips. She states her A1C was in the normal range until taking steroids for ~ 1 year for her RA.     Diet: NPO with ice chips    Patient Vitals for the past 100 hrs:   % Diet Eaten   08/04/19 0806 0 %         Medications: [x]                Reviewed   IVF:  LR at 100 ml/hr  Most Recent POC Glucose:   Recent Labs     08/04/19  1010   GLU 97         Labs:   Lab Results   Component Value Date/Time    Hemoglobin A1c 7.8 (H) 08/02/2019 04:15 AM     Lab Results   Component Value Date/Time    Hemoglobin A1c 7.8 (H) 08/02/2019 04:15 AM    Hemoglobin A1c 8.3 (H) 07/17/2019 03:25 PM    Hemoglobin A1c 6.5 (H) 08/23/2018 02:07 PM     Lab Results   Component Value Date/Time    Sodium 142 08/04/2019 10:10 AM    Potassium 3.8 08/04/2019 10:10 AM    Chloride 106 08/04/2019 10:10 AM    CO2 28 08/04/2019 10:10 AM    Anion gap 8 08/04/2019 10:10 AM    Glucose 97 08/04/2019 10:10 AM    BUN 14 08/04/2019 10:10 AM    Creatinine 0.82 08/04/2019 10:10 AM    Calcium 8.5 08/04/2019 10:10 AM    Magnesium 1.9 08/04/2019 10:10 AM    Phosphorus 2.0 (L) 08/04/2019 10:10 AM    Albumin 3.6 07/24/2019 04:31 PM       Anthropometrics: IBW : 54.4 kg (120 lb), % IBW (Calculated): 128.83 %, BMI (calculated): 26.5  Wt Readings from Last 1 Encounters:   08/01/19 70.1 kg (154 lb 9.6 oz)      Ht Readings from Last 1 Encounters:   08/01/19 5' 4\" (1.626 m)     Last Weight Metrics:  Weight Loss Metrics 8/1/2019 7/30/2019 7/24/2019 7/24/2019 7/22/2019 7/17/2019 7/10/2019   Today's Wt 154 lb 9.6 oz 156 lb 157 lb 157 lb 156 lb 6.4 oz 160 lb 160 lb   BMI 26.54 kg/m2 26.78 kg/m2 26.95 kg/m2 26.95 kg/m2 26.85 kg/m2 27.46 kg/m2 27.46 kg/m2       Estimated Nutrition Needs:  3846 Kcals/day, Protein (g): 84 g Fluid (ml): 1600 ml  Based on:   [x]          Actual BW    []          ABW   []            Adjusted BW         Nutrition Interventions:  None at this time - npo  Goal:   Blood glucose will be within target range of  mg/dL by 8/9/19. Provision of adequate nutrition by 8/22/19.      Nutrition Monitoring and Evaluation      [x]     Monitor po intake on meal rounds  [x]     Continue inpatient monitoring and intervention  []     Other:      Nutrition Risk:  []   High     [x]  Moderate    []  Minimal/Uncompromised    Vitaly Stoner RD, CDE   Office: [facility] Pager:  [phone]

## 2019-08-06 NOTE — DIABETES MGMT
Diabetes Patient/Family Education Record  Factors That  May Influence Patients Ability  to Learn or  Comply with Recommendations   []   Language barrier    []   Cultural needs   []   Motivation    []   Cognitive limitation    []   Physical   []   Education    []   Physiological factors   []   Hearing/vision/speaking impairment   []   Sikhism beliefs    []   Financial factors   []  Other:   [x]  No factors identified at this time.      Person Instructed:   [x]   Patient   []   Family   []  Other     Preference for Learning:   [x]   Verbal   [x]   Written  target BG and A1C  DM class flyer   []  Demonstration     Level of Comprehension & Competence:    [x]  Good                                      [] Fair                                     []  Poor                             []  Needs Reinforcement   [x]  Teachback completed    Education Component:   [x]  Medication management, including how to administer insulin (if appropriate) and potential medication interactions    []  Nutritional management -obtain usual meal pattern   []  Exercise   []  Signs, symptoms, and treatment of hyperglycemia and hypoglycemia   [] Prevention, recognition and treatment of hyperglycemia and hypoglycemia   []  Importance of blood glucose monitoring and how to obtain a blood glucose meter    []  Instruction on use of the blood glucose meter   []  Discuss the importance of HbA1C monitoring    []  Sick day guidelines   []  Proper use and disposal of lancets, needles, syringes or insulin pens (if appropriate)   []  Potential long-term complications (retinopathy, kidney disease, neuropathy, foot care)   [x] Information about whom to contact in case of emergency or for more information    [x]  Goal:  Patient/family will demonstrate understanding of Diabetes Self Management Skills by: (date) _______  Plan for post-discharge education or self-management support:    [x] Outpatient class schedule provided            [] Patient Declined    [] Scheduled for outpatient classes (date) _______  Verify:  Does patient understand how diabetes medications work? ___yes_________________________  Does patient know what their most recent A1c is? ____yes_______________________________  Does patient monitor glucose at home? ____yes_______________________________________  Does patient have difficulty obtaining diabetes medications or testing supplies? __no_______________       Sarah Vargas RD, CHELY   Office:  [address] [place] [address] Pager:  [phone]

## 2019-08-06 NOTE — PROGRESS NOTES
Patient seen and examined. Doing well. Passed flatus. Had one episode of vomiting around the NG tube. Output is 500 cc. Labs normal  Vitals normal expect for tacycaridia    Plan:    D/C NG tube but keep NPO except for sips and chips  Decreased frequency of dilaudid . Added PO percocet.  Added Toradol  Ambulate  Stool softer  DVT prophylaxis

## 2019-08-06 NOTE — PROGRESS NOTES
Problem: Discharge Planning  Goal: *Discharge to safe environment  Outcome: Resolved/Met   Plan home    Plan remains home

## 2019-08-06 NOTE — PROGRESS NOTES
MD paged for temp of 100 F orally. This nurse is aware of elevated MEWS score. Will continue to monitor and follow as needed.

## 2019-08-06 NOTE — ROUTINE PROCESS
Bedside and Verbal shift change report given to Cathie Johnson RN (oncoming nurse) by Re Jeff RN, BSN 
 (offgoing nurse). Report included the following information SBAR, Kardex, OR Summary, Intake/Output, MAR and Recent Results.   
 
Re Jeff RN, BSN

## 2019-08-07 LAB
GLUCOSE BLD STRIP.AUTO-MCNC: 131 MG/DL (ref 70–110)
GLUCOSE BLD STRIP.AUTO-MCNC: 149 MG/DL (ref 70–110)
GLUCOSE BLD STRIP.AUTO-MCNC: 85 MG/DL (ref 70–110)
GLUCOSE BLD STRIP.AUTO-MCNC: 97 MG/DL (ref 70–110)

## 2019-08-07 PROCEDURE — 77030037878 HC DRSG MEPILEX >48IN BORD MOLN -B

## 2019-08-07 PROCEDURE — 65270000029 HC RM PRIVATE

## 2019-08-07 PROCEDURE — 74011250636 HC RX REV CODE- 250/636: Performed by: SURGERY

## 2019-08-07 PROCEDURE — C9113 INJ PANTOPRAZOLE SODIUM, VIA: HCPCS | Performed by: SURGERY

## 2019-08-07 PROCEDURE — 74011250637 HC RX REV CODE- 250/637: Performed by: SURGERY

## 2019-08-07 PROCEDURE — 82962 GLUCOSE BLOOD TEST: CPT

## 2019-08-07 PROCEDURE — 74011000250 HC RX REV CODE- 250: Performed by: SURGERY

## 2019-08-07 RX ORDER — ENOXAPARIN SODIUM 100 MG/ML
40 INJECTION SUBCUTANEOUS EVERY 24 HOURS
Status: DISCONTINUED | OUTPATIENT
Start: 2019-08-07 | End: 2019-08-09 | Stop reason: HOSPADM

## 2019-08-07 RX ORDER — NYSTATIN 100000 U/G
OINTMENT TOPICAL 2 TIMES DAILY
Status: DISCONTINUED | OUTPATIENT
Start: 2019-08-07 | End: 2019-08-09 | Stop reason: HOSPADM

## 2019-08-07 RX ADMIN — DIPHENHYDRAMINE HYDROCHLORIDE 25 MG: 50 INJECTION, SOLUTION INTRAMUSCULAR; INTRAVENOUS at 12:10

## 2019-08-07 RX ADMIN — KETOROLAC TROMETHAMINE 30 MG: 30 INJECTION, SOLUTION INTRAMUSCULAR at 03:11

## 2019-08-07 RX ADMIN — KETOROLAC TROMETHAMINE 30 MG: 30 INJECTION, SOLUTION INTRAMUSCULAR at 21:30

## 2019-08-07 RX ADMIN — KETOROLAC TROMETHAMINE 30 MG: 30 INJECTION, SOLUTION INTRAMUSCULAR at 08:20

## 2019-08-07 RX ADMIN — NYSTATIN OINTMENT: 100000 OINTMENT TOPICAL at 18:00

## 2019-08-07 RX ADMIN — NYSTATIN OINTMENT: 100000 OINTMENT TOPICAL at 11:47

## 2019-08-07 RX ADMIN — SODIUM CHLORIDE, SODIUM LACTATE, POTASSIUM CHLORIDE, AND CALCIUM CHLORIDE 100 ML/HR: 600; 310; 30; 20 INJECTION, SOLUTION INTRAVENOUS at 01:03

## 2019-08-07 RX ADMIN — HYDROMORPHONE HYDROCHLORIDE 1 MG: 1 INJECTION, SOLUTION INTRAMUSCULAR; INTRAVENOUS; SUBCUTANEOUS at 00:26

## 2019-08-07 RX ADMIN — HYDROMORPHONE HYDROCHLORIDE 1 MG: 1 INJECTION, SOLUTION INTRAMUSCULAR; INTRAVENOUS; SUBCUTANEOUS at 05:19

## 2019-08-07 RX ADMIN — OXYCODONE HYDROCHLORIDE AND ACETAMINOPHEN 1 TABLET: 5; 325 TABLET ORAL at 12:03

## 2019-08-07 RX ADMIN — HYDROCORTISONE: 1 OINTMENT TOPICAL at 00:26

## 2019-08-07 RX ADMIN — KETOROLAC TROMETHAMINE 30 MG: 30 INJECTION, SOLUTION INTRAMUSCULAR at 17:00

## 2019-08-07 RX ADMIN — ONDANSETRON 8 MG: 2 INJECTION INTRAMUSCULAR; INTRAVENOUS at 12:47

## 2019-08-07 RX ADMIN — SODIUM CHLORIDE 40 MG: 9 INJECTION INTRAMUSCULAR; INTRAVENOUS; SUBCUTANEOUS at 08:20

## 2019-08-07 RX ADMIN — ENOXAPARIN SODIUM 40 MG: 40 INJECTION SUBCUTANEOUS at 11:47

## 2019-08-07 NOTE — PROGRESS NOTES
Problem: Discharge Planning  Goal: *Discharge to safe environment  Outcome: Resolved/Met   Plan home     Spoke with pt, she states she has ambulated in halls without difficulty. Plan remains home. Cm to follow for needs.

## 2019-08-07 NOTE — PROGRESS NOTES
0725 - received pt in room. Pt resting in bed. Pain rated 7/10 to abdomen. 1568 - assessment completed. Pt is AOx4. VSS. NG tube removed. Pt c/o of soreness to throat. Dressing is c/d/i. IV fluid infusing. Call bell placed at bedside. 1221 - pt resting in bed pain rated 9/10    1326 - observed pt sleeping, no distress noted. 1523 - pt c/o abdominal pain 8/10; pt c/o of nausea, zofran administered,  at bedside. 1845 - pt resting, talking on phone. No distress noted.

## 2019-08-07 NOTE — ROUTINE PROCESS
Bedside and Verbal shift change report given to Chela Maxwell (oncoming nurse) by Qing Pat RN (offgoing nurse). Report included the following information SBAR, Kardex and MAR.

## 2019-08-07 NOTE — PROGRESS NOTES
Patient seen and examined. She is doing well. She stated she had one episode of vomiting but she think this is happened because of the medication. She denies any nausea and she feels hungry. She had multiple flatus and she felt that she was going to have a bowel movement but she did not. The nursing team stated that her fungal infection in the perianal area have worsened since she was started on the cortisone cream.  Her vital signs shows no fever. Her tachycardia has improved, now it is in the 100-105. Her abdomen is soft and nontender and non distended. Her wound is clean. Plan:  Clear liquid diet  Nystatin cream for her perineal fungal infection  I discontinued the hydrocortisone cream again  I D/C dilaudid.    Decreased IV fluids to 50 cc/hr  Ambulation  DVT prophylaxis  Labs tomorrow  Possible discharge planning to home tomorrow

## 2019-08-07 NOTE — PROGRESS NOTES
Patient has nemia due to postoperative blood loss (which is expected from extensive lysis of adhesions).

## 2019-08-07 NOTE — ROUTINE PROCESS
Patient complaint of itching in groin area. Asked nurse to apply hydrocortisone cream to bilateral inguinal areas. Noted reddened area with small raised bumps to inguinal areas and outer labia. Patient states that areas are painful and itch. To notify the physician for alternative treatment. Patient requesting to rest at this time. 0800  Physician notified during his rounds. Physician assessed the patient. Stated that he will write new orders. Bedside and Verbal shift change report given to Evan Castle RN ([place]) (oncoming nurse) by America Hunter RN, BSN 
 (offgoing nurse). Report included the following information SBAR, Kardex, OR Summary, Procedure Summary, Intake/Output, MAR and Recent Results.   
 
America Hunter RN, BSN

## 2019-08-07 NOTE — PROGRESS NOTES
Problem: Diabetes Self-Management  Goal: *Disease process and treatment process  Description  Define diabetes and identify own type of diabetes; list 3 options for treating diabetes. Outcome: Progressing Towards Goal  Goal: *Incorporating nutritional management into lifestyle  Description  Describe effect of type, amount and timing of food on blood glucose; list 3 methods for planning meals. Outcome: Progressing Towards Goal  Goal: *Incorporating physical activity into lifestyle  Description  State effect of exercise on blood glucose levels. Outcome: Progressing Towards Goal  Goal: *Developing strategies to promote health/change behavior  Description  Define the ABC's of diabetes; identify appropriate screenings, schedule and personal plan for screenings. Outcome: Progressing Towards Goal  Goal: *Using medications safely  Description  State effect of diabetes medications on diabetes; name diabetes medication taking, action and side effects. Outcome: Progressing Towards Goal  Goal: *Monitoring blood glucose, interpreting and using results  Description  Identify recommended blood glucose targets  and personal targets. Outcome: Progressing Towards Goal  Goal: *Prevention, detection, treatment of acute complications  Description  List symptoms of hyper- and hypoglycemia; describe how to treat low blood sugar and actions for lowering  high blood glucose level. Outcome: Progressing Towards Goal  Goal: *Prevention, detection and treatment of chronic complications  Description  Define the natural course of diabetes and describe the relationship of blood glucose levels to long term complications of diabetes.   Outcome: Progressing Towards Goal  Goal: *Developing strategies to address psychosocial issues  Description  Describe feelings about living with diabetes; identify support needed and support network  Outcome: Progressing Towards Goal  Goal: *Insulin pump training  Outcome: Progressing Towards Goal  Goal: *Sick day guidelines  Outcome: Progressing Towards Goal  Goal: *Patient Specific Goal (EDIT GOAL, INSERT TEXT)  Outcome: Progressing Towards Goal     Problem: Patient Education: Go to Patient Education Activity  Goal: Patient/Family Education  Outcome: Progressing Towards Goal     Problem: Pain  Goal: *Control of Pain  Outcome: Progressing Towards Goal     Problem: Patient Education: Go to Patient Education Activity  Goal: Patient/Family Education  Outcome: Progressing Towards Goal     Problem: Falls - Risk of  Goal: *Absence of Falls  Description  Document Waldo Swanson Fall Risk and appropriate interventions in the flowsheet. Outcome: Progressing Towards Goal  Note:   Fall Risk Interventions:  Mobility Interventions: Patient to call before getting OOB    Mentation Interventions: Bed/chair exit alarm, Door open when patient unattended    Medication Interventions: Patient to call before getting OOB, Teach patient to arise slowly    Elimination Interventions: Patient to call for help with toileting needs, Toileting schedule/hourly rounds    History of Falls Interventions: Bed/chair exit alarm, Room close to nurse's station         Problem: Patient Education: Go to Patient Education Activity  Goal: Patient/Family Education  Outcome: Progressing Towards Goal     Problem: Pressure Injury - Risk of  Goal: *Prevention of pressure injury  Description  Document Cristi Scale and appropriate interventions in the flowsheet.   Outcome: Progressing Towards Goal  Note:   Pressure Injury Interventions:  Sensory Interventions: Assess changes in LOC    Moisture Interventions: Apply protective barrier, creams and emollients, Absorbent underpads    Activity Interventions: Pressure redistribution bed/mattress(bed type), Increase time out of bed    Mobility Interventions: Pressure redistribution bed/mattress (bed type)    Nutrition Interventions: Document food/fluid/supplement intake    Friction and Shear Interventions: HOB 30 degrees or less Problem: Patient Education: Go to Patient Education Activity  Goal: Patient/Family Education  Outcome: Progressing Towards Goal     Problem: Surgical Pathway Post-Op Day 2 through Discharge  Goal: Off Pathway (Use only if patient is Off Pathway)  Outcome: Progressing Towards Goal  Goal: Activity/Safety  Outcome: Progressing Towards Goal  Goal: Nutrition/Diet  Outcome: Progressing Towards Goal  Goal: Discharge Planning  Outcome: Progressing Towards Goal  Goal: Medications  Outcome: Progressing Towards Goal  Goal: Respiratory  Outcome: Progressing Towards Goal  Goal: Treatments/Interventions/Procedures  Outcome: Progressing Towards Goal  Goal: Psychosocial  Outcome: Progressing Towards Goal  Goal: *No signs and symptoms of infection or wound complications  Outcome: Progressing Towards Goal  Goal: *Optimal pain control at patient's stated goal  Outcome: Progressing Towards Goal  Goal: *Adequate urinary output (equal to or greater than 30 milliliters/hour)  Outcome: Progressing Towards Goal  Goal: *Hemodynamically stable  Outcome: Progressing Towards Goal  Goal: *Tolerating diet  Outcome: Progressing Towards Goal  Goal: *Demonstrates progressive activity  Outcome: Progressing Towards Goal  Goal: *Lungs clear or at baseline  Outcome: Progressing Towards Goal     Problem: Surgical Pathway: Discharge Outcomes  Goal: *Hemodynamically stable  Outcome: Progressing Towards Goal  Goal: *Lungs clear or at baseline  Outcome: Progressing Towards Goal  Goal: *Demonstrates independent activity or return to baseline  Outcome: Progressing Towards Goal  Goal: *Optimal pain control at patient's stated goal  Outcome: Progressing Towards Goal  Goal: *Verbalizes understanding and describes prescribed diet  Outcome: Progressing Towards Goal  Goal: *Tolerating diet  Outcome: Progressing Towards Goal  Goal: *Verbalizes name, dosage, time, side effects, and number of days to continue medications  Outcome: Progressing Towards Goal  Goal: *No signs and symptoms of infection or wound complications  Outcome: Progressing Towards Goal  Goal: *Anxiety reduced or absent  Outcome: Progressing Towards Goal  Goal: *Understands and describes signs and symptoms to report to providers(Stroke Metric)  Outcome: Progressing Towards Goal  Goal: *Describes follow-up/return visits to physicians  Outcome: Progressing Towards Goal  Goal: *Describes available resources and support systems  Outcome: Progressing Towards Goal     Problem: Infection - Risk of, Surgical Site Infection  Goal: *Absence of surgical site infection signs and symptoms  Outcome: Progressing Towards Goal     Problem: Patient Education: Go to Patient Education Activity  Goal: Patient/Family Education  Outcome: Progressing Towards Goal     Problem: Diabetes Maintenance:Admission  Goal: *Blood glucose 80 to 180 mg/dl  Outcome: Progressing Towards Goal  Goal: *Adequate nutrition  Outcome: Progressing Towards Goal

## 2019-08-08 LAB
ANION GAP SERPL CALC-SCNC: 8 MMOL/L (ref 3–18)
BASOPHILS # BLD: 0 K/UL (ref 0–0.1)
BASOPHILS NFR BLD: 0 % (ref 0–2)
BUN SERPL-MCNC: 8 MG/DL (ref 7–18)
BUN/CREAT SERPL: 12 (ref 12–20)
CALCIUM SERPL-MCNC: 8.7 MG/DL (ref 8.5–10.1)
CHLORIDE SERPL-SCNC: 105 MMOL/L (ref 100–111)
CO2 SERPL-SCNC: 27 MMOL/L (ref 21–32)
CREAT SERPL-MCNC: 0.65 MG/DL (ref 0.6–1.3)
DIFFERENTIAL METHOD BLD: ABNORMAL
EOSINOPHIL # BLD: 0.2 K/UL (ref 0–0.4)
EOSINOPHIL NFR BLD: 2 % (ref 0–5)
ERYTHROCYTE [DISTWIDTH] IN BLOOD BY AUTOMATED COUNT: 16.6 % (ref 11.6–14.5)
GLUCOSE BLD STRIP.AUTO-MCNC: 109 MG/DL (ref 70–110)
GLUCOSE BLD STRIP.AUTO-MCNC: 111 MG/DL (ref 70–110)
GLUCOSE BLD STRIP.AUTO-MCNC: 120 MG/DL (ref 70–110)
GLUCOSE BLD STRIP.AUTO-MCNC: 138 MG/DL (ref 70–110)
GLUCOSE SERPL-MCNC: 138 MG/DL (ref 74–99)
HCT VFR BLD AUTO: 23.8 % (ref 35–45)
HGB BLD-MCNC: 7.7 G/DL (ref 12–16)
LYMPHOCYTES # BLD: 1.1 K/UL (ref 0.9–3.6)
LYMPHOCYTES NFR BLD: 13 % (ref 21–52)
MCH RBC QN AUTO: 27.5 PG (ref 24–34)
MCHC RBC AUTO-ENTMCNC: 32.4 G/DL (ref 31–37)
MCV RBC AUTO: 85 FL (ref 74–97)
MONOCYTES # BLD: 0.8 K/UL (ref 0.05–1.2)
MONOCYTES NFR BLD: 9 % (ref 3–10)
NEUTS SEG # BLD: 6.5 K/UL (ref 1.8–8)
NEUTS SEG NFR BLD: 76 % (ref 40–73)
PLATELET # BLD AUTO: 344 K/UL (ref 135–420)
PMV BLD AUTO: 9.3 FL (ref 9.2–11.8)
POTASSIUM SERPL-SCNC: 3.4 MMOL/L (ref 3.5–5.5)
RBC # BLD AUTO: 2.8 M/UL (ref 4.2–5.3)
SODIUM SERPL-SCNC: 140 MMOL/L (ref 136–145)
WBC # BLD AUTO: 8.5 K/UL (ref 4.6–13.2)

## 2019-08-08 PROCEDURE — C9113 INJ PANTOPRAZOLE SODIUM, VIA: HCPCS | Performed by: SURGERY

## 2019-08-08 PROCEDURE — 85025 COMPLETE CBC W/AUTO DIFF WBC: CPT

## 2019-08-08 PROCEDURE — 65270000029 HC RM PRIVATE

## 2019-08-08 PROCEDURE — 74011250637 HC RX REV CODE- 250/637: Performed by: SURGERY

## 2019-08-08 PROCEDURE — 82962 GLUCOSE BLOOD TEST: CPT

## 2019-08-08 PROCEDURE — 74011000250 HC RX REV CODE- 250: Performed by: SURGERY

## 2019-08-08 PROCEDURE — 74011250636 HC RX REV CODE- 250/636: Performed by: SURGERY

## 2019-08-08 PROCEDURE — 80048 BASIC METABOLIC PNL TOTAL CA: CPT

## 2019-08-08 PROCEDURE — 36415 COLL VENOUS BLD VENIPUNCTURE: CPT

## 2019-08-08 RX ORDER — HYDROCHLOROTHIAZIDE 25 MG/1
25 TABLET ORAL
Status: DISCONTINUED | OUTPATIENT
Start: 2019-08-08 | End: 2019-08-08

## 2019-08-08 RX ORDER — PANTOPRAZOLE SODIUM 40 MG/1
40 TABLET, DELAYED RELEASE ORAL
Status: DISCONTINUED | OUTPATIENT
Start: 2019-08-09 | End: 2019-08-09 | Stop reason: HOSPADM

## 2019-08-08 RX ORDER — BISOPROLOL FUMARATE 5 MG/1
2.5 TABLET ORAL DAILY
Status: DISCONTINUED | OUTPATIENT
Start: 2019-08-08 | End: 2019-08-08

## 2019-08-08 RX ORDER — AMLODIPINE BESYLATE 5 MG/1
5 TABLET ORAL DAILY
Status: DISCONTINUED | OUTPATIENT
Start: 2019-08-08 | End: 2019-08-09 | Stop reason: HOSPADM

## 2019-08-08 RX ORDER — BISOPROLOL FUMARATE AND HYDROCHLOROTHIAZIDE 2.5; 6.25 MG/1; MG/1
1 TABLET ORAL DAILY
Status: DISCONTINUED | OUTPATIENT
Start: 2019-08-08 | End: 2019-08-09 | Stop reason: HOSPADM

## 2019-08-08 RX ORDER — AMLODIPINE BESYLATE 2.5 MG/1
2.5 TABLET ORAL DAILY
Status: DISCONTINUED | OUTPATIENT
Start: 2019-08-08 | End: 2019-08-08

## 2019-08-08 RX ADMIN — OXYCODONE HYDROCHLORIDE AND ACETAMINOPHEN 1 TABLET: 5; 325 TABLET ORAL at 01:13

## 2019-08-08 RX ADMIN — NYSTATIN OINTMENT: 100000 OINTMENT TOPICAL at 09:00

## 2019-08-08 RX ADMIN — DIPHENHYDRAMINE HYDROCHLORIDE 25 MG: 50 INJECTION, SOLUTION INTRAMUSCULAR; INTRAVENOUS at 02:45

## 2019-08-08 RX ADMIN — BISOPROLOL FUMARATE AND HYDROCHLOROTHIAZIDE 1 TABLET: 6.25; 2.5 TABLET ORAL at 16:00

## 2019-08-08 RX ADMIN — OXYCODONE HYDROCHLORIDE AND ACETAMINOPHEN 1 TABLET: 5; 325 TABLET ORAL at 20:59

## 2019-08-08 RX ADMIN — ONDANSETRON 8 MG: 2 INJECTION INTRAMUSCULAR; INTRAVENOUS at 09:13

## 2019-08-08 RX ADMIN — DOCUSATE SODIUM 100 MG: 100 CAPSULE, LIQUID FILLED ORAL at 17:26

## 2019-08-08 RX ADMIN — KETOROLAC TROMETHAMINE 30 MG: 30 INJECTION, SOLUTION INTRAMUSCULAR at 02:45

## 2019-08-08 RX ADMIN — DIPHENHYDRAMINE HYDROCHLORIDE 25 MG: 50 INJECTION, SOLUTION INTRAMUSCULAR; INTRAVENOUS at 12:15

## 2019-08-08 RX ADMIN — NYSTATIN OINTMENT: 100000 OINTMENT TOPICAL at 17:22

## 2019-08-08 RX ADMIN — SODIUM CHLORIDE 40 MG: 9 INJECTION INTRAMUSCULAR; INTRAVENOUS; SUBCUTANEOUS at 09:13

## 2019-08-08 RX ADMIN — ONDANSETRON 8 MG: 2 INJECTION INTRAMUSCULAR; INTRAVENOUS at 16:47

## 2019-08-08 RX ADMIN — DIPHENHYDRAMINE HYDROCHLORIDE 25 MG: 50 INJECTION, SOLUTION INTRAMUSCULAR; INTRAVENOUS at 18:35

## 2019-08-08 RX ADMIN — ENOXAPARIN SODIUM 40 MG: 40 INJECTION SUBCUTANEOUS at 09:32

## 2019-08-08 RX ADMIN — OXYCODONE HYDROCHLORIDE AND ACETAMINOPHEN 1 TABLET: 5; 325 TABLET ORAL at 15:07

## 2019-08-08 RX ADMIN — AMLODIPINE BESYLATE 5 MG: 5 TABLET ORAL at 16:47

## 2019-08-08 NOTE — ROUTINE PROCESS
Bedside and Verbal shift change report given to Evan Collazo), RN (oncoming nurse) by Re Jeff RN, BSN 
 (offgoing nurse). Report included the following information SBAR, Kardex, Intake/Output, MAR and Recent Results.   
 
Re Jeff RN, BSN

## 2019-08-08 NOTE — PROGRESS NOTES
26 nurse assessed pt, apical pulse obtained, 101 HR, pt states she is in pain, nurse will confer with md,,, raeann mckinney    1600 pt HR has decreased, pt complains of pain upon nurse assessment, pt continues to have vomitus green bile like contents, pt given po analgesic, pt has not be ambulating stating she feels really bad, nurse spoke with pt  and explained that pt needs to verbalize when she is pain or distress, nurse will encourage more activity once pt feels better, will monitor thoroughly, nurse had PTA resumed,,, raeann rn

## 2019-08-08 NOTE — PROGRESS NOTES
Problem: Diabetes Self-Management  Goal: *Disease process and treatment process  Description  Define diabetes and identify own type of diabetes; list 3 options for treating diabetes. Outcome: Progressing Towards Goal  Goal: *Incorporating nutritional management into lifestyle  Description  Describe effect of type, amount and timing of food on blood glucose; list 3 methods for planning meals. Outcome: Progressing Towards Goal  Goal: *Incorporating physical activity into lifestyle  Description  State effect of exercise on blood glucose levels. Outcome: Progressing Towards Goal  Goal: *Developing strategies to promote health/change behavior  Description  Define the ABC's of diabetes; identify appropriate screenings, schedule and personal plan for screenings. Outcome: Progressing Towards Goal  Goal: *Using medications safely  Description  State effect of diabetes medications on diabetes; name diabetes medication taking, action and side effects. Outcome: Progressing Towards Goal  Goal: *Monitoring blood glucose, interpreting and using results  Description  Identify recommended blood glucose targets  and personal targets. Outcome: Progressing Towards Goal  Goal: *Prevention, detection, treatment of acute complications  Description  List symptoms of hyper- and hypoglycemia; describe how to treat low blood sugar and actions for lowering  high blood glucose level. Outcome: Progressing Towards Goal  Goal: *Prevention, detection and treatment of chronic complications  Description  Define the natural course of diabetes and describe the relationship of blood glucose levels to long term complications of diabetes.   Outcome: Progressing Towards Goal  Goal: *Developing strategies to address psychosocial issues  Description  Describe feelings about living with diabetes; identify support needed and support network  Outcome: Progressing Towards Goal  Goal: *Insulin pump training  Outcome: Progressing Towards Goal  Goal: *Sick day guidelines  Outcome: Progressing Towards Goal  Goal: *Patient Specific Goal (EDIT GOAL, INSERT TEXT)  Outcome: Progressing Towards Goal     Problem: Patient Education: Go to Patient Education Activity  Goal: Patient/Family Education  Outcome: Progressing Towards Goal     Problem: Pain  Goal: *Control of Pain  Outcome: Progressing Towards Goal     Problem: Patient Education: Go to Patient Education Activity  Goal: Patient/Family Education  Outcome: Progressing Towards Goal     Problem: Falls - Risk of  Goal: *Absence of Falls  Description  Document Paolo Raymond Fall Risk and appropriate interventions in the flowsheet. Outcome: Progressing Towards Goal  Note:   Fall Risk Interventions:  Mobility Interventions: Patient to call before getting OOB    Mentation Interventions: Door open when patient unattended, Update white board, Adequate sleep, hydration, pain control, Toileting rounds    Medication Interventions: Patient to call before getting OOB, Teach patient to arise slowly    Elimination Interventions: Call light in reach, Stay With Me (per policy), Patient to call for help with toileting needs    History of Falls Interventions: Door open when patient unattended         Problem: Patient Education: Go to Patient Education Activity  Goal: Patient/Family Education  Outcome: Progressing Towards Goal     Problem: Pressure Injury - Risk of  Goal: *Prevention of pressure injury  Description  Document Cristi Scale and appropriate interventions in the flowsheet.   Outcome: Progressing Towards Goal  Note:   Pressure Injury Interventions:  Sensory Interventions: Assess changes in LOC, Keep linens dry and wrinkle-free, Maintain/enhance activity level    Moisture Interventions: Absorbent underpads, Apply protective barrier, creams and emollients    Activity Interventions: Increase time out of bed, Pressure redistribution bed/mattress(bed type)    Mobility Interventions: HOB 30 degrees or less, Pressure redistribution bed/mattress (bed type)    Nutrition Interventions: Document food/fluid/supplement intake, Offer support with meals,snacks and hydration    Friction and Shear Interventions: Apply protective barrier, creams and emollients, HOB 30 degrees or less                Problem: Patient Education: Go to Patient Education Activity  Goal: Patient/Family Education  Outcome: Progressing Towards Goal     Problem: Surgical Pathway Post-Op Day 2 through Discharge  Goal: Off Pathway (Use only if patient is Off Pathway)  Outcome: Progressing Towards Goal  Goal: Activity/Safety  Outcome: Progressing Towards Goal  Goal: Nutrition/Diet  Outcome: Progressing Towards Goal  Goal: Discharge Planning  Outcome: Progressing Towards Goal  Goal: Medications  Outcome: Progressing Towards Goal  Goal: Respiratory  Outcome: Progressing Towards Goal  Goal: Treatments/Interventions/Procedures  Outcome: Progressing Towards Goal  Goal: Psychosocial  Outcome: Progressing Towards Goal  Goal: *No signs and symptoms of infection or wound complications  Outcome: Progressing Towards Goal  Goal: *Optimal pain control at patient's stated goal  Outcome: Progressing Towards Goal  Goal: *Adequate urinary output (equal to or greater than 30 milliliters/hour)  Outcome: Progressing Towards Goal  Goal: *Hemodynamically stable  Outcome: Progressing Towards Goal  Goal: *Tolerating diet  Outcome: Progressing Towards Goal  Goal: *Demonstrates progressive activity  Outcome: Progressing Towards Goal  Goal: *Lungs clear or at baseline  Outcome: Progressing Towards Goal     Problem: Surgical Pathway: Discharge Outcomes  Goal: *Hemodynamically stable  Outcome: Progressing Towards Goal  Goal: *Lungs clear or at baseline  Outcome: Progressing Towards Goal  Goal: *Demonstrates independent activity or return to baseline  Outcome: Progressing Towards Goal  Goal: *Optimal pain control at patient's stated goal  Outcome: Progressing Towards Goal  Goal: *Verbalizes understanding and describes prescribed diet  Outcome: Progressing Towards Goal  Goal: *Tolerating diet  Outcome: Progressing Towards Goal  Goal: *Verbalizes name, dosage, time, side effects, and number of days to continue medications  Outcome: Progressing Towards Goal  Goal: *No signs and symptoms of infection or wound complications  Outcome: Progressing Towards Goal  Goal: *Anxiety reduced or absent  Outcome: Progressing Towards Goal  Goal: *Understands and describes signs and symptoms to report to providers(Stroke Metric)  Outcome: Progressing Towards Goal  Goal: *Describes follow-up/return visits to physicians  Outcome: Progressing Towards Goal  Goal: *Describes available resources and support systems  Outcome: Progressing Towards Goal     Problem: Infection - Risk of, Surgical Site Infection  Goal: *Absence of surgical site infection signs and symptoms  Outcome: Progressing Towards Goal     Problem: Patient Education: Go to Patient Education Activity  Goal: Patient/Family Education  Outcome: Progressing Towards Goal     Problem: Diabetes Maintenance:Admission  Goal: *Blood glucose 80 to 180 mg/dl  Outcome: Progressing Towards Goal  Goal: *Adequate nutrition  Outcome: Progressing Towards Goal

## 2019-08-08 NOTE — PROGRESS NOTES
Patient seen and examined. She is doing well. No issues overnight. Still having some abdominal pain but getting better. Passing \"a lot\" of flatus. No BM yet. No fever. Still mildly tachycardic. WBC is normal. Stable anemia from lysis of adhesions. Creatinine normal.  Abdomen is soft and non-tender and non-distended. Wound is healing well. Plan:  I am plaining to discharge patient home. She is concerned. I told her I will check on her at noon and we weill decide. Hep lock IV fluids  No IV narcotics. I stopped Toradol too. Only PO percocet. Ambulation. Soft regular diet.

## 2019-08-09 VITALS
OXYGEN SATURATION: 95 % | SYSTOLIC BLOOD PRESSURE: 125 MMHG | TEMPERATURE: 99 F | RESPIRATION RATE: 16 BRPM | WEIGHT: 154.6 LBS | DIASTOLIC BLOOD PRESSURE: 76 MMHG | HEIGHT: 64 IN | HEART RATE: 112 BPM | BODY MASS INDEX: 26.4 KG/M2

## 2019-08-09 LAB
ERYTHROCYTE [DISTWIDTH] IN BLOOD BY AUTOMATED COUNT: 17 % (ref 11.6–14.5)
GLUCOSE BLD STRIP.AUTO-MCNC: 117 MG/DL (ref 70–110)
GLUCOSE BLD STRIP.AUTO-MCNC: 127 MG/DL (ref 70–110)
GLUCOSE BLD STRIP.AUTO-MCNC: 129 MG/DL (ref 70–110)
HCT VFR BLD AUTO: 24.6 % (ref 35–45)
HGB BLD-MCNC: 7.5 G/DL (ref 12–16)
MCH RBC QN AUTO: 26.3 PG (ref 24–34)
MCHC RBC AUTO-ENTMCNC: 30.5 G/DL (ref 31–37)
MCV RBC AUTO: 86.3 FL (ref 74–97)
PLATELET # BLD AUTO: 428 K/UL (ref 135–420)
PMV BLD AUTO: 9.5 FL (ref 9.2–11.8)
RBC # BLD AUTO: 2.85 M/UL (ref 4.2–5.3)
WBC # BLD AUTO: 8.6 K/UL (ref 4.6–13.2)

## 2019-08-09 PROCEDURE — 82962 GLUCOSE BLOOD TEST: CPT

## 2019-08-09 PROCEDURE — 85027 COMPLETE CBC AUTOMATED: CPT

## 2019-08-09 PROCEDURE — 74011250636 HC RX REV CODE- 250/636: Performed by: SURGERY

## 2019-08-09 PROCEDURE — 74011250637 HC RX REV CODE- 250/637: Performed by: SURGERY

## 2019-08-09 PROCEDURE — 36415 COLL VENOUS BLD VENIPUNCTURE: CPT

## 2019-08-09 RX ORDER — OXYCODONE AND ACETAMINOPHEN 5; 325 MG/1; MG/1
1 TABLET ORAL
Qty: 24 TAB | Refills: 0 | Status: SHIPPED | OUTPATIENT
Start: 2019-08-09 | End: 2019-08-12

## 2019-08-09 RX ADMIN — DIPHENHYDRAMINE HYDROCHLORIDE 25 MG: 50 INJECTION, SOLUTION INTRAMUSCULAR; INTRAVENOUS at 10:52

## 2019-08-09 RX ADMIN — PANTOPRAZOLE SODIUM 40 MG: 40 TABLET, DELAYED RELEASE ORAL at 07:30

## 2019-08-09 RX ADMIN — AMLODIPINE BESYLATE 5 MG: 5 TABLET ORAL at 09:00

## 2019-08-09 RX ADMIN — OXYCODONE HYDROCHLORIDE AND ACETAMINOPHEN 1 TABLET: 5; 325 TABLET ORAL at 00:47

## 2019-08-09 RX ADMIN — ONDANSETRON 8 MG: 2 INJECTION INTRAMUSCULAR; INTRAVENOUS at 11:55

## 2019-08-09 RX ADMIN — DIPHENHYDRAMINE HYDROCHLORIDE 25 MG: 50 INJECTION, SOLUTION INTRAMUSCULAR; INTRAVENOUS at 00:48

## 2019-08-09 RX ADMIN — BISOPROLOL FUMARATE AND HYDROCHLOROTHIAZIDE 1 TABLET: 6.25; 2.5 TABLET ORAL at 09:00

## 2019-08-09 RX ADMIN — OXYCODONE HYDROCHLORIDE AND ACETAMINOPHEN 1 TABLET: 5; 325 TABLET ORAL at 10:52

## 2019-08-09 NOTE — PROGRESS NOTES
Problem: Discharge Planning  Goal: *Discharge to safe environment  Outcome: Resolved/Met   Plan home    Pt dc'd home no services ordered. Care Management Interventions  PCP Verified by CM: Yes  Palliative Care Criteria Met (RRAT>21 & CHF Dx)?: No  Mode of Transport at Discharge:  Other (see comment)  Transition of Care Consult (CM Consult): Discharge Planning  Discharge Durable Medical Equipment: No  Physical Therapy Consult: No  Occupational Therapy Consult: No  Speech Therapy Consult: No  Current Support Network: Lives with Spouse  Confirm Follow Up Transport: Family  Plan discussed with Pt/Family/Caregiver: Yes  Discharge Location  Discharge Placement: Home

## 2019-08-09 NOTE — DISCHARGE INSTRUCTIONS
Your A1C  was   Lab Results   Component Value Date/Time    Hemoglobin A1c 7.8 (H) 08/02/2019 04:15 AM   .      This lab test reflects that your blood sugar was slightly elevated over the past 3 months and should be evaluated by your primary care provider. Your average blood glucose has been 177 mg/dl for the past 2-3 months. DISCHARGE SUMMARY from Nurse    PATIENT INSTRUCTIONS:    After general anesthesia or intravenous sedation, for 24 hours or while taking prescription Narcotics:  · Limit your activities  · Do not drive and operate hazardous machinery  · Do not make important personal or business decisions  · Do  not drink alcoholic beverages  · If you have not urinated within 8 hours after discharge, please contact your surgeon on call. Report the following to your surgeon:  · Excessive pain, swelling, redness or odor of or around the surgical area  · Temperature over 100.5  · Nausea and vomiting lasting longer than 4 hours or if unable to take medications  · Any signs of decreased circulation or nerve impairment to extremity: change in color, persistent  numbness, tingling, coldness or increase pain  · Any questions    What to do at Home:  Recommended activity: Activity as tolerated. *  Please give a list of your current medications to your Primary Care Provider. *  Please update this list whenever your medications are discontinued, doses are      changed, or new medications (including over-the-counter products) are added. *  Please carry medication information at all times in case of emergency situations. These are general instructions for a healthy lifestyle:    No smoking/ No tobacco products/ Avoid exposure to second hand smoke  Surgeon General's Warning:  Quitting smoking now greatly reduces serious risk to your health.     Obesity, smoking, and sedentary lifestyle greatly increases your risk for illness    A healthy diet, regular physical exercise & weight monitoring are important for maintaining a healthy lifestyle    You may be retaining fluid if you have a history of heart failure or if you experience any of the following symptoms:  Weight gain of 3 pounds or more overnight or 5 pounds in a week, increased swelling in our hands or feet or shortness of breath while lying flat in bed. Please call your doctor as soon as you notice any of these symptoms; do not wait until your next office visit. The discharge information has been reviewed with the patient. The patient verbalized understanding. Discharge medications reviewed with the patient and appropriate educational materials and side effects teaching were provided. ___________________________________________________________________________________________________________________________________Discharge Instructions Following Surgery    Patient: Lila Rivera MRN: [id number]  SSN: [id number]    YOB: 1960  Age: 61 y.o. Sex: female      Activity  · As tolerated, walking encourage, stairs are okay. · Avoid strenuous activities - no lifting anything heavier than 15 pounds till seen in the clinic. · You may shower at home    Diet  · Regular diet. Pain  · Take pain medication as directed by your doctor. Please add Aleve or Ibuprofen as needed (If no contraindications for these types of medications). ·  Call your doctor if pain is NOT relieved by medication. Call your doctor if  · Excessive bleeding that does not stop after holding mild pressure over the area. · Temperature of 101 degrees F or above. · Redness,excessive swelling or bruising, and/or green or yellow, smelly discharge from incision. · If nausea and vomiting continues. Appointment date/time Follow-Up Phone Calls    · Call the office at [phone] to make your follow-up appointment in 2 weeks after the surgery (if not already set up) . Dr. Alverto Amin cell phone number is [phone].  Please call me if you have any concerns or questions.

## 2019-08-09 NOTE — PROGRESS NOTES
Patient seen and examined. She is doing relatively well. She has one episode of vomiting but otherwise she is tolerating diet and passing flatus. Her vitals showed no fever. Her WBC is normal.\Abdomen is soft and non-tender and non-distended. Wound is clean. She said that she is afraid to go home. I explained to the patient that she is passing flatus, her abdominal pain is better and she is not on any IV pain medications, her WBC is normal, she has no fever, and her abdomen is soft and her wound is clean. So I told her that I would like her to go home today and she will follow up with me this Monday and not in 2 weeks to make sure that she is ok. I told her that she has my cell phone number and she can call me if needed. She did agree with that plan.

## 2019-08-09 NOTE — PROGRESS NOTES
Bedside shift change report given to Bettie Romeo Matt (oncoming nurse) by Padma Sewell (offgoing nurse). Report included the following information SBAR, Kardex, Intake/Output and MAR.

## 2019-08-09 NOTE — DISCHARGE SUMMARY
General Surgery Discharge Note    Admission Date: 8/1/2019    Discharge Date: 8/9/2019    Admission Diagnosis:  Left adnexal mass. Abdominal pain. Discharge Diagnosis:  Abdominal adhesions. Procedures Performed:   Exploratory laparotomy, extensive lysis of adhesions. Hospital Course:  Patient was admitted on 8/1/2019 for elective exploratory laparotomy, lysis of adhesions and removal of left adnexal mass. The surgery was performed by a general surgeon Jason Keith and a GYN surgeon (Dr. Seven Burton). She had sever adhesions, and small bowel resection was performed. The adnexal mass was not found. Patient admitted to the floor postoperatively, with NG tube, monitored as per protocol. Her NG tube was removed few days later and she was started on liquid diet. Her diet was advanced slowly and her IV medications were weaned. At the time of discharge the patient is afebrile, vital signs stable, tolerating a diet, voiding spontaneously, ambulatory with adequate pain control with oral medications and clear surgical site without evidence of infection. Condition on Discharge:  Stable. Follow-up care : Follow-up in 2 weeks in the clinic ([address] [place] [address] instructions provided)    Disposition:  Home.     Discharge Medications:      Current Facility-Administered Medications   Medication Dose Route Frequency    bisoprolol-hydroCHLOROthiazide (ZIAC) 2.5-6.25 mg per tablet 1 Tab  1 Tab Oral DAILY    amLODIPine (NORVASC) tablet 5 mg  5 mg Oral DAILY    pantoprazole (PROTONIX) tablet 40 mg  40 mg Oral ACB    nystatin (MYCOSTATIN) 100,000 unit/gram ointment   Topical BID    enoxaparin (LOVENOX) injection 40 mg  40 mg SubCUTAneous Q24H    acetaminophen (TYLENOL) suppository 650 mg  650 mg Rectal Q4H PRN    oxyCODONE-acetaminophen (PERCOCET) 5-325 mg per tablet 1 Tab  1 Tab Oral Q4H PRN    docusate sodium (COLACE) capsule 100 mg  100 mg Oral BID    insulin lispro (HUMALOG) injection   SubCUTAneous Q6H    glucose chewable tablet 16 g  4 Tab Oral PRN    glucagon (GLUCAGEN) injection 1 mg  1 mg IntraMUSCular PRN    dextrose 10 % infusion 125-250 mL  125-250 mL IntraVENous PRN    ondansetron (ZOFRAN) injection 8 mg  8 mg IntraVENous Q6H PRN    diphenhydrAMINE (BENADRYL) injection 25 mg  25 mg IntraVENous Q6H PRN       Local wound care with daily showers, keep wounds clean and dry    Activity: as desired, no lifting greater than 15lbs or situps for 30 days    Special Instructions:   No driving until activity is not influenced by incisional pain and off narcotics   No bath or hot tub until wounds are healed   Notify [facility] for a fever>101, redness or foul-smelling  drainage from incision, or worsening pain. Followup with surgeon in 10-14 days.

## 2019-08-09 NOTE — PROGRESS NOTES
Problem: Diabetes Self-Management  Goal: *Disease process and treatment process  Description  Define diabetes and identify own type of diabetes; list 3 options for treating diabetes. Outcome: Progressing Towards Goal  Goal: *Incorporating nutritional management into lifestyle  Description  Describe effect of type, amount and timing of food on blood glucose; list 3 methods for planning meals. Outcome: Progressing Towards Goal  Goal: *Incorporating physical activity into lifestyle  Description  State effect of exercise on blood glucose levels. Outcome: Progressing Towards Goal  Goal: *Developing strategies to promote health/change behavior  Description  Define the ABC's of diabetes; identify appropriate screenings, schedule and personal plan for screenings. Outcome: Progressing Towards Goal  Goal: *Using medications safely  Description  State effect of diabetes medications on diabetes; name diabetes medication taking, action and side effects. Outcome: Progressing Towards Goal  Goal: *Monitoring blood glucose, interpreting and using results  Description  Identify recommended blood glucose targets  and personal targets. Outcome: Progressing Towards Goal  Goal: *Prevention, detection, treatment of acute complications  Description  List symptoms of hyper- and hypoglycemia; describe how to treat low blood sugar and actions for lowering  high blood glucose level. Outcome: Progressing Towards Goal  Goal: *Prevention, detection and treatment of chronic complications  Description  Define the natural course of diabetes and describe the relationship of blood glucose levels to long term complications of diabetes.   Outcome: Progressing Towards Goal  Goal: *Developing strategies to address psychosocial issues  Description  Describe feelings about living with diabetes; identify support needed and support network  Outcome: Progressing Towards Goal  Goal: *Insulin pump training  Outcome: Progressing Towards Goal  Goal: *Sick day guidelines  Outcome: Progressing Towards Goal  Goal: *Patient Specific Goal (EDIT GOAL, INSERT TEXT)  Outcome: Progressing Towards Goal     Problem: Patient Education: Go to Patient Education Activity  Goal: Patient/Family Education  Outcome: Progressing Towards Goal     Problem: Pain  Goal: *Control of Pain  Outcome: Progressing Towards Goal     Problem: Patient Education: Go to Patient Education Activity  Goal: Patient/Family Education  Outcome: Progressing Towards Goal     Problem: Falls - Risk of  Goal: *Absence of Falls  Description  Document Waldo Swanson Fall Risk and appropriate interventions in the flowsheet. Outcome: Progressing Towards Goal  Note:   Fall Risk Interventions:  Mobility Interventions: Patient to call before getting OOB    Mentation Interventions: Adequate sleep, hydration, pain control    Medication Interventions: Patient to call before getting OOB, Teach patient to arise slowly    Elimination Interventions: Call light in reach, Patient to call for help with toileting needs    History of Falls Interventions: Evaluate medications/consider consulting pharmacy, Door open when patient unattended         Problem: Patient Education: Go to Patient Education Activity  Goal: Patient/Family Education  Outcome: Progressing Towards Goal     Problem: Pressure Injury - Risk of  Goal: *Prevention of pressure injury  Description  Document Cristi Scale and appropriate interventions in the flowsheet.   Outcome: Progressing Towards Goal  Note:   Pressure Injury Interventions:  Sensory Interventions: Assess changes in LOC    Moisture Interventions: Absorbent underpads, Apply protective barrier, creams and emollients    Activity Interventions: Increase time out of bed    Mobility Interventions: HOB 30 degrees or less, Pressure redistribution bed/mattress (bed type)    Nutrition Interventions: Document food/fluid/supplement intake    Friction and Shear Interventions: Apply protective barrier, creams and emollients, HOB 30 degrees or less, Lift sheet, Transferring/repositioning devices                Problem: Patient Education: Go to Patient Education Activity  Goal: Patient/Family Education  Outcome: Progressing Towards Goal     Problem: Surgical Pathway Post-Op Day 2 through Discharge  Goal: Off Pathway (Use only if patient is Off Pathway)  Outcome: Progressing Towards Goal  Goal: Activity/Safety  Outcome: Progressing Towards Goal  Goal: Nutrition/Diet  Outcome: Progressing Towards Goal  Goal: Discharge Planning  Outcome: Progressing Towards Goal  Goal: Medications  Outcome: Progressing Towards Goal  Goal: Respiratory  Outcome: Progressing Towards Goal  Goal: Treatments/Interventions/Procedures  Outcome: Progressing Towards Goal  Goal: Psychosocial  Outcome: Progressing Towards Goal  Goal: *No signs and symptoms of infection or wound complications  Outcome: Progressing Towards Goal  Goal: *Optimal pain control at patient's stated goal  Outcome: Progressing Towards Goal  Goal: *Adequate urinary output (equal to or greater than 30 milliliters/hour)  Outcome: Progressing Towards Goal  Goal: *Hemodynamically stable  Outcome: Progressing Towards Goal  Goal: *Tolerating diet  Outcome: Progressing Towards Goal  Goal: *Demonstrates progressive activity  Outcome: Progressing Towards Goal  Goal: *Lungs clear or at baseline  Outcome: Progressing Towards Goal     Problem: Surgical Pathway: Discharge Outcomes  Goal: *Hemodynamically stable  Outcome: Progressing Towards Goal  Goal: *Lungs clear or at baseline  Outcome: Progressing Towards Goal  Goal: *Demonstrates independent activity or return to baseline  Outcome: Progressing Towards Goal  Goal: *Optimal pain control at patient's stated goal  Outcome: Progressing Towards Goal  Goal: *Verbalizes understanding and describes prescribed diet  Outcome: Progressing Towards Goal  Goal: *Tolerating diet  Outcome: Progressing Towards Goal  Goal: *Verbalizes name, dosage, time, side effects, and number of days to continue medications  Outcome: Progressing Towards Goal  Goal: *No signs and symptoms of infection or wound complications  Outcome: Progressing Towards Goal  Goal: *Anxiety reduced or absent  Outcome: Progressing Towards Goal  Goal: *Understands and describes signs and symptoms to report to providers(Stroke Metric)  Outcome: Progressing Towards Goal  Goal: *Describes follow-up/return visits to physicians  Outcome: Progressing Towards Goal  Goal: *Describes available resources and support systems  Outcome: Progressing Towards Goal     Problem: Infection - Risk of, Surgical Site Infection  Goal: *Absence of surgical site infection signs and symptoms  Outcome: Progressing Towards Goal     Problem: Patient Education: Go to Patient Education Activity  Goal: Patient/Family Education  Outcome: Progressing Towards Goal     Problem: Diabetes Maintenance:Admission  Goal: *Blood glucose 80 to 180 mg/dl  Outcome: Progressing Towards Goal  Goal: *Adequate nutrition  Outcome: Progressing Towards Goal

## 2019-08-10 ENCOUNTER — APPOINTMENT (OUTPATIENT)
Dept: CT IMAGING | Age: 59
DRG: 330 | End: 2019-08-10
Attending: EMERGENCY MEDICINE
Payer: COMMERCIAL

## 2019-08-10 ENCOUNTER — ANESTHESIA (OUTPATIENT)
Dept: SURGERY | Age: 59
DRG: 330 | End: 2019-08-10
Payer: COMMERCIAL

## 2019-08-10 ENCOUNTER — HOSPITAL ENCOUNTER (INPATIENT)
Age: 59
LOS: 12 days | Discharge: NURSING, REHAB OR LONG-TERM CARE | DRG: 330 | End: 2019-08-22
Attending: EMERGENCY MEDICINE | Admitting: SURGERY
Payer: COMMERCIAL

## 2019-08-10 ENCOUNTER — ANESTHESIA EVENT (OUTPATIENT)
Dept: SURGERY | Age: 59
DRG: 330 | End: 2019-08-10
Payer: COMMERCIAL

## 2019-08-10 ENCOUNTER — APPOINTMENT (OUTPATIENT)
Dept: GENERAL RADIOLOGY | Age: 59
DRG: 330 | End: 2019-08-10
Attending: SURGERY
Payer: COMMERCIAL

## 2019-08-10 ENCOUNTER — APPOINTMENT (OUTPATIENT)
Dept: GENERAL RADIOLOGY | Age: 59
DRG: 330 | End: 2019-08-10
Attending: PHYSICIAN ASSISTANT
Payer: COMMERCIAL

## 2019-08-10 ENCOUNTER — APPOINTMENT (OUTPATIENT)
Dept: GENERAL RADIOLOGY | Age: 59
DRG: 330 | End: 2019-08-10
Attending: EMERGENCY MEDICINE
Payer: COMMERCIAL

## 2019-08-10 DIAGNOSIS — K56.609 SBO (SMALL BOWEL OBSTRUCTION) (HCC): ICD-10-CM

## 2019-08-10 DIAGNOSIS — L02.91 ABSCESS: ICD-10-CM

## 2019-08-10 PROBLEM — D64.9 ANEMIA: Status: ACTIVE | Noted: 2019-08-10

## 2019-08-10 LAB
ALBUMIN SERPL-MCNC: 2.4 G/DL (ref 3.4–5)
ALBUMIN/GLOB SERPL: 0.6 {RATIO} (ref 0.8–1.7)
ALP SERPL-CCNC: 137 U/L (ref 45–117)
ALT SERPL-CCNC: 13 U/L (ref 13–56)
ANION GAP SERPL CALC-SCNC: 8 MMOL/L (ref 3–18)
AST SERPL-CCNC: 11 U/L (ref 10–38)
BASOPHILS # BLD: 0 K/UL (ref 0–0.1)
BASOPHILS NFR BLD: 0 % (ref 0–2)
BILIRUB SERPL-MCNC: 0.8 MG/DL (ref 0.2–1)
BUN SERPL-MCNC: 13 MG/DL (ref 7–18)
BUN/CREAT SERPL: 15 (ref 12–20)
CALCIUM SERPL-MCNC: 8.7 MG/DL (ref 8.5–10.1)
CHLORIDE SERPL-SCNC: 100 MMOL/L (ref 100–111)
CO2 SERPL-SCNC: 30 MMOL/L (ref 21–32)
CREAT SERPL-MCNC: 0.86 MG/DL (ref 0.6–1.3)
DIFFERENTIAL METHOD BLD: ABNORMAL
EOSINOPHIL # BLD: 0 K/UL (ref 0–0.4)
EOSINOPHIL NFR BLD: 0 % (ref 0–5)
ERYTHROCYTE [DISTWIDTH] IN BLOOD BY AUTOMATED COUNT: 17.6 % (ref 11.6–14.5)
EST. AVERAGE GLUCOSE BLD GHB EST-MCNC: 143 MG/DL
GLOBULIN SER CALC-MCNC: 4 G/DL (ref 2–4)
GLUCOSE SERPL-MCNC: 151 MG/DL (ref 74–99)
HBA1C MFR BLD: 6.6 % (ref 4.2–5.6)
HCT VFR BLD AUTO: 25.4 % (ref 35–45)
HGB BLD-MCNC: 7.9 G/DL (ref 12–16)
LACTATE BLD-SCNC: 1.14 MMOL/L (ref 0.4–2)
LIPASE SERPL-CCNC: 131 U/L (ref 73–393)
LYMPHOCYTES # BLD: 1.1 K/UL (ref 0.9–3.6)
LYMPHOCYTES NFR BLD: 8 % (ref 21–52)
MCH RBC QN AUTO: 26.6 PG (ref 24–34)
MCHC RBC AUTO-ENTMCNC: 31.1 G/DL (ref 31–37)
MCV RBC AUTO: 85.5 FL (ref 74–97)
MONOCYTES # BLD: 0.9 K/UL (ref 0.05–1.2)
MONOCYTES NFR BLD: 6 % (ref 3–10)
NEUTS SEG # BLD: 11.6 K/UL (ref 1.8–8)
NEUTS SEG NFR BLD: 86 % (ref 40–73)
PLATELET # BLD AUTO: 449 K/UL (ref 135–420)
PMV BLD AUTO: 9.9 FL (ref 9.2–11.8)
POTASSIUM SERPL-SCNC: 3 MMOL/L (ref 3.5–5.5)
PROT SERPL-MCNC: 6.4 G/DL (ref 6.4–8.2)
RBC # BLD AUTO: 2.97 M/UL (ref 4.2–5.3)
SODIUM SERPL-SCNC: 138 MMOL/L (ref 136–145)
WBC # BLD AUTO: 13.5 K/UL (ref 4.6–13.2)

## 2019-08-10 PROCEDURE — 65270000029 HC RM PRIVATE

## 2019-08-10 PROCEDURE — 77030036731 HC STPLR ENDOSC J&J -F: Performed by: SURGERY

## 2019-08-10 PROCEDURE — 77030033827 HC SLV COMPR SCD THGH COVD -B: Performed by: SURGERY

## 2019-08-10 PROCEDURE — 71045 X-RAY EXAM CHEST 1 VIEW: CPT

## 2019-08-10 PROCEDURE — 77030018846 HC SOL IRR STRL H20 ICUM -A: Performed by: SURGERY

## 2019-08-10 PROCEDURE — 77030011276 HC ELECTRD LIG AXS COVD -E: Performed by: SURGERY

## 2019-08-10 PROCEDURE — 74011250636 HC RX REV CODE- 250/636: Performed by: PHYSICIAN ASSISTANT

## 2019-08-10 PROCEDURE — 77030003028 HC SUT VCRL J&J -A: Performed by: SURGERY

## 2019-08-10 PROCEDURE — 93005 ELECTROCARDIOGRAM TRACING: CPT

## 2019-08-10 PROCEDURE — 80053 COMPREHEN METABOLIC PANEL: CPT

## 2019-08-10 PROCEDURE — 74011250636 HC RX REV CODE- 250/636: Performed by: SURGERY

## 2019-08-10 PROCEDURE — 77030018842 HC SOL IRR SOD CL 9% BAXT -A: Performed by: SURGERY

## 2019-08-10 PROCEDURE — 99285 EMERGENCY DEPT VISIT HI MDM: CPT

## 2019-08-10 PROCEDURE — 74011000250 HC RX REV CODE- 250

## 2019-08-10 PROCEDURE — 77030008683 HC TU ET CUF COVD -A: Performed by: ANESTHESIOLOGY

## 2019-08-10 PROCEDURE — 74011250636 HC RX REV CODE- 250/636: Performed by: NURSE ANESTHETIST, CERTIFIED REGISTERED

## 2019-08-10 PROCEDURE — 76210000016 HC OR PH I REC 1 TO 1.5 HR: Performed by: SURGERY

## 2019-08-10 PROCEDURE — 74011250636 HC RX REV CODE- 250/636

## 2019-08-10 PROCEDURE — 74019 RADEX ABDOMEN 2 VIEWS: CPT

## 2019-08-10 PROCEDURE — 83605 ASSAY OF LACTIC ACID: CPT

## 2019-08-10 PROCEDURE — 77030008771 HC TU NG SALEM SUMP -A: Performed by: ANESTHESIOLOGY

## 2019-08-10 PROCEDURE — 77030013079 HC BLNKT BAIR HGGR 3M -A: Performed by: ANESTHESIOLOGY

## 2019-08-10 PROCEDURE — 77030002966 HC SUT PDS J&J -A: Performed by: SURGERY

## 2019-08-10 PROCEDURE — 83690 ASSAY OF LIPASE: CPT

## 2019-08-10 PROCEDURE — 87040 BLOOD CULTURE FOR BACTERIA: CPT

## 2019-08-10 PROCEDURE — 0DN80ZZ RELEASE SMALL INTESTINE, OPEN APPROACH: ICD-10-PCS | Performed by: SURGERY

## 2019-08-10 PROCEDURE — 0DB80ZZ EXCISION OF SMALL INTESTINE, OPEN APPROACH: ICD-10-PCS | Performed by: SURGERY

## 2019-08-10 PROCEDURE — 88307 TISSUE EXAM BY PATHOLOGIST: CPT

## 2019-08-10 PROCEDURE — 77030018723 HC ELCTRD BLD COVD -A: Performed by: SURGERY

## 2019-08-10 PROCEDURE — 77030031139 HC SUT VCRL2 J&J -A: Performed by: SURGERY

## 2019-08-10 PROCEDURE — 77030009979 HC RELD STPLR TCR J&J -C: Performed by: SURGERY

## 2019-08-10 PROCEDURE — 74018 RADEX ABDOMEN 1 VIEW: CPT

## 2019-08-10 PROCEDURE — 77030002916 HC SUT ETHLN J&J -A: Performed by: SURGERY

## 2019-08-10 PROCEDURE — 77030008462 HC STPLR SKN PROX J&J -A: Performed by: SURGERY

## 2019-08-10 PROCEDURE — 77030011278 HC ELECTRD LIG IMPT COVD -F: Performed by: SURGERY

## 2019-08-10 PROCEDURE — 96375 TX/PRO/DX INJ NEW DRUG ADDON: CPT

## 2019-08-10 PROCEDURE — 0DNU0ZZ RELEASE OMENTUM, OPEN APPROACH: ICD-10-PCS | Performed by: SURGERY

## 2019-08-10 PROCEDURE — 76010000162 HC OR TIME 1.5 TO 2 HR INTENSV-TIER 1: Performed by: SURGERY

## 2019-08-10 PROCEDURE — 96374 THER/PROPH/DIAG INJ IV PUSH: CPT

## 2019-08-10 PROCEDURE — 83036 HEMOGLOBIN GLYCOSYLATED A1C: CPT

## 2019-08-10 PROCEDURE — 74011250636 HC RX REV CODE- 250/636: Performed by: EMERGENCY MEDICINE

## 2019-08-10 PROCEDURE — 77030034850: Performed by: SURGERY

## 2019-08-10 PROCEDURE — 74011000250 HC RX REV CODE- 250: Performed by: SURGERY

## 2019-08-10 PROCEDURE — 85025 COMPLETE CBC W/AUTO DIFF WBC: CPT

## 2019-08-10 PROCEDURE — 77030011267 HC ELECTRD BLD COVD -A: Performed by: SURGERY

## 2019-08-10 PROCEDURE — 77030037870 HC GLD SHT PREVALON SAGE -B

## 2019-08-10 PROCEDURE — 74176 CT ABD & PELVIS W/O CONTRAST: CPT

## 2019-08-10 PROCEDURE — 74011000272 HC RX REV CODE- 272: Performed by: SURGERY

## 2019-08-10 PROCEDURE — 76060000034 HC ANESTHESIA 1.5 TO 2 HR: Performed by: SURGERY

## 2019-08-10 RX ORDER — MAGNESIUM SULFATE 100 %
4 CRYSTALS MISCELLANEOUS AS NEEDED
Status: DISCONTINUED | OUTPATIENT
Start: 2019-08-10 | End: 2019-08-10

## 2019-08-10 RX ORDER — LIDOCAINE HYDROCHLORIDE 20 MG/ML
INJECTION, SOLUTION EPIDURAL; INFILTRATION; INTRACAUDAL; PERINEURAL AS NEEDED
Status: DISCONTINUED | OUTPATIENT
Start: 2019-08-10 | End: 2019-08-10 | Stop reason: HOSPADM

## 2019-08-10 RX ORDER — NEOSTIGMINE METHYLSULFATE 1 MG/ML
INJECTION, SOLUTION INTRAVENOUS AS NEEDED
Status: DISCONTINUED | OUTPATIENT
Start: 2019-08-10 | End: 2019-08-10 | Stop reason: HOSPADM

## 2019-08-10 RX ORDER — ACETAMINOPHEN 650 MG/1
650 SUPPOSITORY RECTAL
Status: CANCELLED | OUTPATIENT
Start: 2019-08-10

## 2019-08-10 RX ORDER — SUCCINYLCHOLINE CHLORIDE 100 MG/5ML
SYRINGE (ML) INTRAVENOUS AS NEEDED
Status: DISCONTINUED | OUTPATIENT
Start: 2019-08-10 | End: 2019-08-10 | Stop reason: HOSPADM

## 2019-08-10 RX ORDER — ENOXAPARIN SODIUM 100 MG/ML
40 INJECTION SUBCUTANEOUS EVERY 24 HOURS
Status: CANCELLED | OUTPATIENT
Start: 2019-08-10

## 2019-08-10 RX ORDER — GLYCOPYRROLATE 0.6MG/3ML
SYRINGE (ML) INTRAVENOUS AS NEEDED
Status: DISCONTINUED | OUTPATIENT
Start: 2019-08-10 | End: 2019-08-10 | Stop reason: HOSPADM

## 2019-08-10 RX ORDER — INSULIN LISPRO 100 [IU]/ML
INJECTION, SOLUTION INTRAVENOUS; SUBCUTANEOUS
Status: DISCONTINUED | OUTPATIENT
Start: 2019-08-10 | End: 2019-08-10

## 2019-08-10 RX ORDER — MAGNESIUM SULFATE 100 %
4 CRYSTALS MISCELLANEOUS AS NEEDED
Status: DISCONTINUED | OUTPATIENT
Start: 2019-08-10 | End: 2019-08-10 | Stop reason: HOSPADM

## 2019-08-10 RX ORDER — MIDAZOLAM HYDROCHLORIDE 1 MG/ML
INJECTION, SOLUTION INTRAMUSCULAR; INTRAVENOUS AS NEEDED
Status: DISCONTINUED | OUTPATIENT
Start: 2019-08-10 | End: 2019-08-10 | Stop reason: HOSPADM

## 2019-08-10 RX ORDER — ALBUTEROL SULFATE 0.83 MG/ML
2.5 SOLUTION RESPIRATORY (INHALATION) AS NEEDED
Status: DISCONTINUED | OUTPATIENT
Start: 2019-08-10 | End: 2019-08-10 | Stop reason: HOSPADM

## 2019-08-10 RX ORDER — KETOROLAC TROMETHAMINE 30 MG/ML
30 INJECTION, SOLUTION INTRAMUSCULAR; INTRAVENOUS EVERY 6 HOURS
Status: COMPLETED | OUTPATIENT
Start: 2019-08-10 | End: 2019-08-15

## 2019-08-10 RX ORDER — DIPHENHYDRAMINE HYDROCHLORIDE 50 MG/ML
12.5 INJECTION, SOLUTION INTRAMUSCULAR; INTRAVENOUS
Status: DISCONTINUED | OUTPATIENT
Start: 2019-08-10 | End: 2019-08-10 | Stop reason: HOSPADM

## 2019-08-10 RX ORDER — DEXTROSE MONOHYDRATE AND SODIUM CHLORIDE 5; .45 G/100ML; G/100ML
75 INJECTION, SOLUTION INTRAVENOUS CONTINUOUS
Status: CANCELLED | OUTPATIENT
Start: 2019-08-10

## 2019-08-10 RX ORDER — SODIUM CHLORIDE, SODIUM LACTATE, POTASSIUM CHLORIDE, CALCIUM CHLORIDE 600; 310; 30; 20 MG/100ML; MG/100ML; MG/100ML; MG/100ML
25 INJECTION, SOLUTION INTRAVENOUS CONTINUOUS
Status: DISCONTINUED | OUTPATIENT
Start: 2019-08-10 | End: 2019-08-20

## 2019-08-10 RX ORDER — FENTANYL CITRATE 50 UG/ML
INJECTION, SOLUTION INTRAMUSCULAR; INTRAVENOUS AS NEEDED
Status: DISCONTINUED | OUTPATIENT
Start: 2019-08-10 | End: 2019-08-10 | Stop reason: HOSPADM

## 2019-08-10 RX ORDER — SODIUM CHLORIDE 0.9 % (FLUSH) 0.9 %
5-40 SYRINGE (ML) INJECTION AS NEEDED
Status: CANCELLED | OUTPATIENT
Start: 2019-08-10

## 2019-08-10 RX ORDER — ONDANSETRON 2 MG/ML
4 INJECTION INTRAMUSCULAR; INTRAVENOUS
Status: CANCELLED | OUTPATIENT
Start: 2019-08-10

## 2019-08-10 RX ORDER — CEFAZOLIN SODIUM IN 0.9 % NACL 2 G/100 ML
PLASTIC BAG, INJECTION (ML) INTRAVENOUS AS NEEDED
Status: DISCONTINUED | OUTPATIENT
Start: 2019-08-10 | End: 2019-08-10 | Stop reason: HOSPADM

## 2019-08-10 RX ORDER — FENTANYL CITRATE 50 UG/ML
25 INJECTION, SOLUTION INTRAMUSCULAR; INTRAVENOUS
Status: DISCONTINUED | OUTPATIENT
Start: 2019-08-10 | End: 2019-08-10 | Stop reason: HOSPADM

## 2019-08-10 RX ORDER — INSULIN LISPRO 100 [IU]/ML
INJECTION, SOLUTION INTRAVENOUS; SUBCUTANEOUS ONCE
Status: DISCONTINUED | OUTPATIENT
Start: 2019-08-10 | End: 2019-08-10 | Stop reason: HOSPADM

## 2019-08-10 RX ORDER — FENTANYL CITRATE 50 UG/ML
50 INJECTION, SOLUTION INTRAMUSCULAR; INTRAVENOUS
Status: COMPLETED | OUTPATIENT
Start: 2019-08-10 | End: 2019-08-10

## 2019-08-10 RX ORDER — MORPHINE SULFATE 2 MG/ML
2 INJECTION, SOLUTION INTRAMUSCULAR; INTRAVENOUS
Status: CANCELLED | OUTPATIENT
Start: 2019-08-10

## 2019-08-10 RX ORDER — HYDROMORPHONE HYDROCHLORIDE 1 MG/ML
1 INJECTION, SOLUTION INTRAMUSCULAR; INTRAVENOUS; SUBCUTANEOUS
Status: DISCONTINUED | OUTPATIENT
Start: 2019-08-10 | End: 2019-08-11

## 2019-08-10 RX ORDER — VECURONIUM BROMIDE FOR INJECTION 1 MG/ML
INJECTION, POWDER, LYOPHILIZED, FOR SOLUTION INTRAVENOUS AS NEEDED
Status: DISCONTINUED | OUTPATIENT
Start: 2019-08-10 | End: 2019-08-10 | Stop reason: HOSPADM

## 2019-08-10 RX ORDER — ONDANSETRON 2 MG/ML
6 INJECTION INTRAMUSCULAR; INTRAVENOUS
Status: DISCONTINUED | OUTPATIENT
Start: 2019-08-10 | End: 2019-08-15

## 2019-08-10 RX ORDER — SODIUM CHLORIDE, SODIUM LACTATE, POTASSIUM CHLORIDE, CALCIUM CHLORIDE 600; 310; 30; 20 MG/100ML; MG/100ML; MG/100ML; MG/100ML
100 INJECTION, SOLUTION INTRAVENOUS CONTINUOUS
Status: DISCONTINUED | OUTPATIENT
Start: 2019-08-10 | End: 2019-08-10 | Stop reason: HOSPADM

## 2019-08-10 RX ORDER — OXYCODONE AND ACETAMINOPHEN 5; 325 MG/1; MG/1
1 TABLET ORAL AS NEEDED
Status: DISCONTINUED | OUTPATIENT
Start: 2019-08-10 | End: 2019-08-10 | Stop reason: HOSPADM

## 2019-08-10 RX ORDER — PROPOFOL 10 MG/ML
INJECTION, EMULSION INTRAVENOUS AS NEEDED
Status: DISCONTINUED | OUTPATIENT
Start: 2019-08-10 | End: 2019-08-10 | Stop reason: HOSPADM

## 2019-08-10 RX ORDER — LIDOCAINE HYDROCHLORIDE 20 MG/ML
15 SOLUTION OROPHARYNGEAL
Status: COMPLETED | OUTPATIENT
Start: 2019-08-10 | End: 2019-08-10

## 2019-08-10 RX ORDER — METOCLOPRAMIDE HYDROCHLORIDE 5 MG/ML
5 INJECTION INTRAMUSCULAR; INTRAVENOUS ONCE
Status: COMPLETED | OUTPATIENT
Start: 2019-08-10 | End: 2019-08-10

## 2019-08-10 RX ORDER — SODIUM CHLORIDE 9 MG/ML
INJECTION, SOLUTION INTRAVENOUS
Status: DISCONTINUED | OUTPATIENT
Start: 2019-08-10 | End: 2019-08-10 | Stop reason: HOSPADM

## 2019-08-10 RX ORDER — SODIUM CHLORIDE 0.9 % (FLUSH) 0.9 %
5-40 SYRINGE (ML) INJECTION EVERY 8 HOURS
Status: CANCELLED | OUTPATIENT
Start: 2019-08-10

## 2019-08-10 RX ORDER — HYDROMORPHONE HYDROCHLORIDE 1 MG/ML
0.5 INJECTION, SOLUTION INTRAMUSCULAR; INTRAVENOUS; SUBCUTANEOUS
Status: DISCONTINUED | OUTPATIENT
Start: 2019-08-10 | End: 2019-08-10 | Stop reason: HOSPADM

## 2019-08-10 RX ORDER — LORAZEPAM 2 MG/ML
1 INJECTION INTRAMUSCULAR ONCE
Status: COMPLETED | OUTPATIENT
Start: 2019-08-10 | End: 2019-08-10

## 2019-08-10 RX ORDER — SODIUM CHLORIDE 0.9 % (FLUSH) 0.9 %
5-10 SYRINGE (ML) INJECTION AS NEEDED
Status: DISCONTINUED | OUTPATIENT
Start: 2019-08-10 | End: 2019-08-10

## 2019-08-10 RX ADMIN — FENTANYL CITRATE 25 MCG: 50 INJECTION, SOLUTION INTRAMUSCULAR; INTRAVENOUS at 17:29

## 2019-08-10 RX ADMIN — LIDOCAINE HYDROCHLORIDE 60 MG: 20 INJECTION, SOLUTION EPIDURAL; INFILTRATION; INTRACAUDAL; PERINEURAL at 15:00

## 2019-08-10 RX ADMIN — FENTANYL CITRATE 50 MCG: 50 INJECTION, SOLUTION INTRAMUSCULAR; INTRAVENOUS at 15:29

## 2019-08-10 RX ADMIN — FENTANYL CITRATE 50 MCG: 50 INJECTION, SOLUTION INTRAMUSCULAR; INTRAVENOUS at 12:23

## 2019-08-10 RX ADMIN — Medication 100 MG: at 15:22

## 2019-08-10 RX ADMIN — LORAZEPAM 1 MG: 2 INJECTION, SOLUTION INTRAMUSCULAR; INTRAVENOUS at 13:40

## 2019-08-10 RX ADMIN — METOCLOPRAMIDE 5 MG: 5 INJECTION, SOLUTION INTRAMUSCULAR; INTRAVENOUS at 12:23

## 2019-08-10 RX ADMIN — FENTANYL CITRATE 50 MCG: 50 INJECTION, SOLUTION INTRAMUSCULAR; INTRAVENOUS at 15:49

## 2019-08-10 RX ADMIN — FENTANYL CITRATE 25 MCG: 50 INJECTION, SOLUTION INTRAMUSCULAR; INTRAVENOUS at 17:15

## 2019-08-10 RX ADMIN — SODIUM CHLORIDE 1000 ML: 900 INJECTION, SOLUTION INTRAVENOUS at 11:55

## 2019-08-10 RX ADMIN — PROPOFOL 150 MG: 10 INJECTION, EMULSION INTRAVENOUS at 15:00

## 2019-08-10 RX ADMIN — KETOROLAC TROMETHAMINE 30 MG: 30 INJECTION, SOLUTION INTRAMUSCULAR at 18:48

## 2019-08-10 RX ADMIN — SODIUM CHLORIDE, SODIUM LACTATE, POTASSIUM CHLORIDE, AND CALCIUM CHLORIDE 100 ML/HR: 600; 310; 30; 20 INJECTION, SOLUTION INTRAVENOUS at 21:06

## 2019-08-10 RX ADMIN — Medication 140 MG: at 15:00

## 2019-08-10 RX ADMIN — NEOSTIGMINE METHYLSULFATE 3 MG: 1 INJECTION, SOLUTION INTRAVENOUS at 16:30

## 2019-08-10 RX ADMIN — KETOROLAC TROMETHAMINE 30 MG: 30 INJECTION, SOLUTION INTRAMUSCULAR at 23:44

## 2019-08-10 RX ADMIN — SODIUM CHLORIDE: 9 INJECTION, SOLUTION INTRAVENOUS at 14:01

## 2019-08-10 RX ADMIN — MIDAZOLAM HYDROCHLORIDE 2 MG: 1 INJECTION, SOLUTION INTRAMUSCULAR; INTRAVENOUS at 15:25

## 2019-08-10 RX ADMIN — Medication 0.4 MG: at 16:30

## 2019-08-10 RX ADMIN — Medication 2 G: at 15:30

## 2019-08-10 RX ADMIN — LIDOCAINE HYDROCHLORIDE 15 ML: 20 SOLUTION ORAL; TOPICAL at 13:15

## 2019-08-10 RX ADMIN — PROPOFOL 150 MG: 10 INJECTION, EMULSION INTRAVENOUS at 15:22

## 2019-08-10 RX ADMIN — VECURONIUM BROMIDE FOR INJECTION 5 MG: 1 INJECTION, POWDER, LYOPHILIZED, FOR SOLUTION INTRAVENOUS at 15:29

## 2019-08-10 RX ADMIN — TOPICAL ANESTHETIC 200 SPRAY: 200 SPRAY DENTAL; PERIODONTAL at 13:15

## 2019-08-10 NOTE — ED PROVIDER NOTES
EMERGENCY DEPARTMENT HISTORY AND PHYSICAL EXAM    11:17 AM      Date: 8/10/2019  Patient Name: Isis Eisenberg    History of Presenting Illness     Chief Complaint   Patient presents with    Vomiting         History Provided By: Patient      Additional History (Context): Isis Eisenberg is a 61 y.o. female with PMHx of abdominal adhesions, diabetes, hypertension, anemia, and asthma who presents with worsening diffuse abdominal pain. Patient was discharged after small bowel resection yesterday by Dr. Frank Singleton (Gen Surgery). She was eating clear, then soft food diet while in the hospital. She states she only ate chicken noodle soup at home. Associated symptoms include nausea and vomiting. No other concerns or symptoms at this time.     PCP: Melly Harrell MD    Chief Complaint: Abdominal Pain  Duration:  \"yesterday\"  Timing:  Worsening  Location: diffuse abdomen  Quality: N/A  Severity: N/A  Modifying Factors: bowel resection  Associated Symptoms: nausea, vomiting    Current Facility-Administered Medications   Medication Dose Route Frequency Provider Last Rate Last Dose    insulin lispro (HUMALOG) injection   SubCUTAneous AC&HS Jackelyn Kocher, DO        glucose chewable tablet 16 g  4 Tab Oral PRN Jackelyn Kocher, DO        glucagon (GLUCAGEN) injection 1 mg  1 mg IntraMUSCular PRN Jackelyn Kocher, DO        dextrose 10 % infusion 125-250 mL  125-250 mL IntraVENous PRN Jackelyn Kocher, DO        sodium chloride irrigation 0.9 % 500 mL Irrigation    PRN Danette Harper MD         Facility-Administered Medications Ordered in Other Encounters   Medication Dose Route Frequency Provider Last Rate Last Dose    propofol (DIPRIVAN) 10 mg/mL injection   IntraVENous PRN Clinton Tiffany L, CRNA   150 mg at 08/10/19 1522    succinylcholine (ANECTINE) 20 mg/mL syringe   IntraVENous PRN Clinton Tiffany L, CRNA   100 mg at 08/10/19 1522    lidocaine (PF) (XYLOCAINE) 20 mg/mL (2 %) injection IntraVENous PRN Vitaly Delaware, CRNA   60 mg at 08/10/19 1500    midazolam (VERSED) injection   IntraVENous PRN Vitaly [place], CRNA   2 mg at 08/10/19 1525    vecuronium (NORCURON) injection   IntraVENous PRN Vitaly [place], CRNA   5 mg at 08/10/19 1529    fentaNYL citrate (PF) injection   IntraVENous PRN Vitaly Delaware, CRNA   50 mcg at 08/10/19 1549    ceFAZolin (ANCEF) 2g in 100 ml 0.9% NS IVPB   IntraVENous PRN Vitaly [place], CRNA   2 g at 08/10/19 1530    0.9% sodium chloride infusion   IntraVENous CONTINUOUS Vitaly [place], CRNA           Past History     Past Medical History:  Past Medical History:   Diagnosis Date    Abdominal adhesions     Adnexal cyst 7/30/2019    Left    Anemia     Asthma     Chronic pelvic pain in female 7/30/2019    Diabetes mellitus     Dyskinesia     bilateral    Essential hypertension     GERD (gastroesophageal reflux disease)     Hypertension     Menopause     Microhematuria     Rheumatoid arteritis 2018    Stool color black        Past Surgical History:  Past Surgical History:   Procedure Laterality Date    COLONOSCOPY N/A 1/21/2019    COLONOSCOPY performed by Tanmay Garvin [facility] at [facility]    HX CHOLECYSTECTOMY  6/28/10    HX COLONOSCOPY      HX ENDOSCOPY      HX HERNIA REPAIR      HX HYSTERECTOMY  1989    Fibroids    HX KNEE REPLACEMENT Left     HX KNEE REPLACEMENT Right 06/2018    HX OOPHORECTOMY  12/2000    HX ORTHOPAEDIC Right     ankle- multiple surgeries    HX SMALL BOWEL RESECTION  12/2000    HX SMALL BOWEL RESECTION  02/21/2017    Dr. Nataly Kumar         Family History:  Family History   Problem Relation Age of Onset    Hypertension Other         parent    Breast Cancer Other 21    Heart Disease Father     Diabetes Father     Lung Disease Father     Diabetes Mother     Hypertension Other         sibling    Diabetes Other         parent    Kidney Disease Brother     Diabetes Brother  Lung Disease Brother        Social History:  Social History     Tobacco Use    Smoking status: Never Smoker    Smokeless tobacco: Never Used   Substance Use Topics    Alcohol use: No    Drug use: No       Allergies: Allergies   Allergen Reactions    Macrodantin [Nitrofurantoin Macrocrystalline] Itching and Other (comments)    Tape [Adhesive] Other (comments)     Paper tape-- feels like burning skin  Burned skin when had wound vac         Review of Systems       Review of Systems   Constitutional: Negative for activity change, fatigue and fever. HENT: Negative for congestion and rhinorrhea. Eyes: Negative for visual disturbance. Respiratory: Negative for shortness of breath. Cardiovascular: Negative for chest pain and palpitations. Gastrointestinal: Positive for abdominal pain, nausea and vomiting. Negative for diarrhea. Genitourinary: Negative for dysuria and hematuria. Musculoskeletal: Negative for back pain. Skin: Negative for rash. Neurological: Negative for dizziness, weakness and light-headedness. All other systems reviewed and are negative. Physical Exam     Visit Vitals  /69   Pulse (!) 115   Temp 97.6 °F (36.4 °C)   Resp 20   Ht 5' 4\" (1.626 m)   Wt 69.9 kg (154 lb)   SpO2 (!) 89%   BMI 26.43 kg/m²         Physical Exam   Constitutional: She is oriented to person, place, and time. She appears well-developed and well-nourished. No distress. Appears uncomfortable. HENT:   Head: Normocephalic and atraumatic. Right Ear: External ear normal.   Left Ear: External ear normal.   Nose: Nose normal.   Mouth/Throat: Mucous membranes are dry. Eyes: Pupils are equal, round, and reactive to light. Conjunctivae and EOM are normal.   Neck: Normal range of motion. Neck supple. No tracheal deviation present. Cardiovascular: Regular rhythm and intact distal pulses. Tachycardia present. Pulmonary/Chest: Effort normal and breath sounds normal. She exhibits no tenderness. Abdominal: Soft. Bowel sounds are normal. She exhibits distension. There is generalized tenderness. There is no rebound and no guarding. Musculoskeletal: Normal range of motion. She exhibits no edema or tenderness. Neurological: She is alert and oriented to person, place, and time. No cranial nerve deficit. Coordination normal.   Skin: Skin is warm and dry. Psychiatric: She has a normal mood and affect. Her behavior is normal. Judgment and thought content normal.   Nursing note and vitals reviewed. Diagnostic Study Results     Labs -  Recent Results (from the past 12 hour(s))   CBC WITH AUTOMATED DIFF    Collection Time: 08/10/19 11:40 AM   Result Value Ref Range    WBC 13.5 (H) 4.6 - 13.2 K/uL    RBC 2.97 (L) 4.20 - 5.30 M/uL    HGB 7.9 (L) 12.0 - 16.0 g/dL    HCT 25.4 (L) 35.0 - 45.0 %    MCV 85.5 74.0 - 97.0 FL    MCH 26.6 24.0 - 34.0 PG    MCHC 31.1 31.0 - 37.0 g/dL    RDW 17.6 (H) 11.6 - 14.5 %    PLATELET 558 (H) 567 - 420 K/uL    MPV 9.9 9.2 - 11.8 FL    NEUTROPHILS 86 (H) 40 - 73 %    LYMPHOCYTES 8 (L) 21 - 52 %    MONOCYTES 6 3 - 10 %    EOSINOPHILS 0 0 - 5 %    BASOPHILS 0 0 - 2 %    ABS. NEUTROPHILS 11.6 (H) 1.8 - 8.0 K/UL    ABS. LYMPHOCYTES 1.1 0.9 - 3.6 K/UL    ABS. MONOCYTES 0.9 0.05 - 1.2 K/UL    ABS. EOSINOPHILS 0.0 0.0 - 0.4 K/UL    ABS.  BASOPHILS 0.0 0.0 - 0.1 K/UL    DF AUTOMATED     CULTURE, BLOOD    Collection Time: 08/10/19 11:40 AM   Result Value Ref Range    Special Requests: PERIPHERAL      Culture result: PENDING    HEMOGLOBIN A1C WITH EAG    Collection Time: 08/10/19 11:40 AM   Result Value Ref Range    Hemoglobin A1c 6.6 (H) 4.2 - 5.6 %    Est. average glucose 143 mg/dL   POC LACTIC ACID    Collection Time: 08/10/19 11:52 AM   Result Value Ref Range    Lactic Acid (POC) 1.14 0.40 - 2.00 mmol/L   CULTURE, BLOOD    Collection Time: 08/10/19 12:00 PM   Result Value Ref Range    Special Requests: PERIPHERAL      Culture result: PENDING    LIPASE    Collection Time: 08/10/19 1:34 PM   Result Value Ref Range    Lipase 131 73 - 631 U/L   METABOLIC PANEL, COMPREHENSIVE    Collection Time: 08/10/19  1:34 PM   Result Value Ref Range    Sodium 138 136 - 145 mmol/L    Potassium 3.0 (L) 3.5 - 5.5 mmol/L    Chloride 100 100 - 111 mmol/L    CO2 30 21 - 32 mmol/L    Anion gap 8 3.0 - 18 mmol/L    Glucose 151 (H) 74 - 99 mg/dL    BUN 13 7.0 - 18 MG/DL    Creatinine 0.86 0.6 - 1.3 MG/DL    BUN/Creatinine ratio 15 12 - 20      GFR est AA >60 >60 ml/min/1.73m2    GFR est non-AA >60 >60 ml/min/1.73m2    Calcium 8.7 8.5 - 10.1 MG/DL    Bilirubin, total 0.8 0.2 - 1.0 MG/DL    ALT (SGPT) 13 13 - 56 U/L    AST (SGOT) 11 10 - 38 U/L    Alk. phosphatase 137 (H) 45 - 117 U/L    Protein, total 6.4 6.4 - 8.2 g/dL    Albumin 2.4 (L) 3.4 - 5.0 g/dL    Globulin 4.0 2.0 - 4.0 g/dL    A-G Ratio 0.6 (L) 0.8 - 1.7         Radiologic Studies -   CT ABD PELV WO CONT   Final Result   IMPRESSION:       1. Interval postsurgical changes. There are fluid-filled mildly distended loops   of mid small bowel within the midabdomen with adjacent mesenteric edema and   small amount of free intraperitoneal fluid. Some of the free intraperitoneal   fluid demonstrates layering hyperdensity, likely representing a small component   of hemoperitoneum. There appears to be a transition to decompressed small bowel   within the lower mid abdomen, near the patient's anastomotic suture lines. Appearance is suggestive of either partially obstructive interloop adhesive   disease or possibly an internal transmesenteric hernia. 2. No organized drainable fluid collection to suggest abscess. 3. Stable 3.8 x 2.6 cm cystic structure along the left pelvic sidewall. XR ABD (AP AND ERECT OR DECUB)   Final Result   IMPRESSION: Abnormal bowel gas pattern with slightly distended loops of small   bowel within the left mid and upper abdomen with few air-fluid levels concerning   for possible obstruction.  Recommend continued radiographic surveillance. XR CHEST PORT   Final Result   IMPRESSION: Low lung volumes with associated hypoventilatory changes including   areas of bibasilar subsegmental atelectasis. Medical Decision Making     It should be noted that I, Maki Bhatt DO will be the provider of record for this patient. I reviewed the vital signs, available nursing notes, past medical history, past surgical history, family history and social history. Vital Signs-Reviewed the patient's vital signs. Pulse Oximetry Analysis -  97% on room air , nml    Cardiac Monitor:  Rate: 120 BPM, tachycardic    EKG: Interpreted by the EP. Time Interpreted: 11:21   Rate: 120 BPM   Rhythm: Sinus Tachycardia    Interpretation: No acute changes    Records Reviewed: Nursing Notes and Old Medical Records (Time of Review: 11:17 AM)    Orders Placed This Encounter    CULTURE, BLOOD    CULTURE, BLOOD    XR CHEST PORT    XR ABD (AP AND ERECT OR DECUB)    CT ABD PELV WO CONT    CBC WITH AUTOMATED DIFF    LIPASE    METABOLIC PANEL, COMPREHENSIVE    HEMOGLOBIN A1C    NURSING-MISCELLANEOUS: SEE HYPOGLYCEMIA PROTOCOL BELOW HYPOGLYCEMIA PROTOCOL: Initiate Hypoglycemia protocol if blood glucose is less than 70 mg/dL FOR CONSCIOUS PATIENT: Administer 4 ounces fruit juice OR regular soda OR 4 glucose tablets. FOR UN. ..    NURSING-MISCELLANEOUS: Blood glucose targets -- ICU: 140 - 180 mg/dL;  NON-ICU: 100 - 140 mg/dL CONTINUOUS    POC LACTIC ACID    DISCONTD: sodium chloride 0.9 % bolus infusion 1,000 mL    DISCONTD: sodium chloride (NS) flush 5-10 mL    sodium chloride 0.9 % bolus infusion 1,000 mL    DISCONTD: sodium chloride 0.9 % bolus infusion 1,000 mL    DISCONTD: sodium chloride 0.9 % bolus infusion 97 mL    metoclopramide HCl (REGLAN) injection 5 mg    fentaNYL citrate (PF) injection 50 mcg    lidocaine (XYLOCAINE) 2 % viscous solution 15 mL    DISCONTD: benzocaine (HURRICAINE) 20 % spray    insulin lispro (HUMALOG) injection    glucose chewable tablet 16 g    glucagon (GLUCAGEN) injection 1 mg    dextrose 10 % infusion 125-250 mL    LORazepam (ATIVAN) injection 1 mg    sodium chloride irrigation 0.9 % 500 mL Irrigation    INITIAL PHYSICIAN ORDER: INPATIENT Surgical; 9. Other (further clarification in H&P documentation)    INITIAL PHYSICIAN ORDER: INPATIENT Surgical; 9. Other (further clarification in H&P documentation)       ED Course: Progress Notes, Reevaluation, and Consults:  12:20 PM  Consult:  Discussed care with Dr. Melody Tello (General Surgery) Standard discussion; including history of patients chief complaint, available diagnostic results, and treatment course. States to scan patient, place NG tube, and call on-call surgeon. 12:27 PM  Consult:  Discussed care with Dr. Theron Hall (General Surgery) Standard discussion; including history of patients chief complaint, available diagnostic results, and treatment course. He will come to the ED and see the patient. 12:59 PM  Dr. Theron Hall at beside seeing the patient. He would like to admit the patient to be admitted to hospitalist.    1:22 PM  Consult:  Discussed care with Dr. Kishor Dupree [facility]) Standard discussion; including history of patients chief complaint, available diagnostic results, and treatment course. Accepts patient admission. 2:00 PM  CT report with concern for intra-mesenteric hernia. Discussed with Dr. Theron Hall who reviewed CT again. Will contact Dr. Marialuisa Tello will come in and likely take the patient to the OR. Will admit to their service. Provider Notes (Medical Decision Making):   Patient is a 59-year-old female who had surgery on 8/1. Presents with abdominal pain and vomiting and concern for obstruction. Surgery paged. CT performed and showing high-grade obstruction versus intermesenteric hernia. Patient likely going to OR with general surgery. Stable at the time of admission.   Vitals stable except for tachycardia but that has improved. Blood work within normal limits. Critical Care Time:  The services I provided to this patient were to treat and/or prevent clinically significant deterioration that could result in the failure of one or more body systems and/or organ systems due to surgical emergency. Services included the following:  -reviewing nursing notes and old charts  -vital sign assessments  -direct patient care  -medication orders and management  -interpreting and reviewing diagnostic studies/labs  -re-evaluations  -documentation time    Aggregate critical care time was 35 minutes, which includes only time during which I was engaged in work directly related to the patient's care as described above, whether I was at bedside or elsewhere in the Emergency Department. It did not include time spent performing other reported procedures or the services of residents, students, nurses, or advance practice providers. Suhas Camarillo DO          For Hospitalized Patients:    Hospitalization Decision Time:  The decision to hospitalize the patient was made by Dr. Kathy Gee at 1:21 PM on 8/10/2019      Diagnosis     Clinical Impression:   1. SBO (small bowel obstruction) ([facility])        Disposition: Admitted    Follow-up Information    None          Current Discharge Medication List      CONTINUE these medications which have NOT CHANGED    Details   oxyCODONE-acetaminophen (PERCOCET) 5-325 mg per tablet Take 1 Tab by mouth every four (4) hours as needed for Pain for up to 3 days. Max Daily Amount: 6 Tabs. Qty: 24 Tab, Refills: 0    Associated Diagnoses: S/P small bowel resection      cyclobenzaprine (FLEXERIL) 10 mg tablet Take 1 Tab by mouth three (3) times daily as needed for Muscle Spasm(s). Qty: 20 Tab, Refills: 0    Associated Diagnoses: Primary osteoarthritis of left knee      metFORMIN (GLUMETZA ER) 500 mg TG24 24 hour tablet Take 1,000 mg by mouth two (2) times a day.  take 1 tablet by mouth twice a day  Qty: 120 Tab, Refills: 11    Associated Diagnoses: Steroid-induced diabetes (HCC)      multivitamin (ONE A DAY) tablet Take 1 Tab by mouth daily. ondansetron hcl (ZOFRAN) 4 mg tablet Take 1 Tab by mouth every eight (8) hours as needed for Nausea. Qty: 12 Tab, Refills: 0      ALLERGY RELIEF, CETIRIZINE, 10 mg tablet take 1 tablet by mouth once daily  Qty: 90 Tab, Refills: 3    Associated Diagnoses: Dermatitis      bisoprolol-hydroCHLOROthiazide (ZIAC) 2.5-6.25 mg per tablet Take 1 Tab by mouth daily. Qty: 90 Tab, Refills: 3    Associated Diagnoses: Essential hypertension      doxepin (SINEQUAN) 10 mg capsule Take 1 Cap by mouth nightly. Qty: 30 Cap, Refills: 11    Associated Diagnoses: Insomnia secondary to chronic pain      omeprazole (PRILOSEC) 10 mg capsule take 1 capsule by mouth once daily  Qty: 30 Cap, Refills: 11    Associated Diagnoses: Gastroesophageal reflux disease without esophagitis      FARXIGA 10 mg tab tablet take 1 tablet by mouth once daily  Qty: 90 Tab, Refills: 3    Associated Diagnoses: Type 2 diabetes mellitus with hyperglycemia, without long-term current use of insulin ([facility])      amLODIPine (NORVASC) 2.5 mg tablet take 1 tablet by mouth once daily NIGHTLY  Qty: 90 Tab, Refills: 1      albuterol (PROVENTIL HFA, VENTOLIN HFA, PROAIR HFA) 90 mcg/actuation inhaler Take 1 Puff by inhalation every six (6) hours as needed for Wheezing. Qty: 1 Inhaler, Refills: 0      folic acid (FOLVITE) 1 mg tablet Take  by mouth daily. methotrexate (RHEUMATREX) 2.5 mg tablet Take 7.5 mg by mouth every Tuesday. sulfaSALAzine (AZULFIDINE) 500 mg tablet Take 500 mg by mouth two (2) times a day.       glucose blood VI test strips (ONETOUCH ULTRA TEST) strip Check blood glucose as directed  Qty: 100 Strip, Refills: 1    Associated Diagnoses: Type 2 diabetes mellitus with hyperglycemia, without long-term current use of insulin ([facility]           _______________________________       Jeremy Brito Maycol Garcia acting as a scribe for and in the presence of Valentín Patino DO      August 10, 2019 at 4:02 PM       Provider Attestation:      I personally performed the services described in the documentation, reviewed the documentation, as recorded by the scribe in my presence, and it accurately and completely records my words and actions.  August 10, 2019 at 4:02 PM - Valentín Patino DO       _______________________________

## 2019-08-10 NOTE — PROGRESS NOTES
Problem: Discharge Planning  Goal: *Discharge to safe environment  Outcome: Progressing Towards Goal    Reason for Admission:   Early Post-op Bowel Obstruction                  RRAT Score:    19              Do you (patient/family) have any concerns for transition/discharge? Mother does not have concerns currently              Plan for utilizing home health: Will assess the need for    Current Advanced Directive/Advance Care Plan:  No            Transition of Care Plan:     Home. Will need to follow-up with patient on Monday as being taken to Emergent surgery and is currently sedated. At ED Bedside to interview patient. Mother is present. Patient just received Ativan and is sedated and unable to be interviewed. Spoke briefly with patient's mother. Verified Face sheet information and is correct. Patient lives with her spouse and her mother. She was Independent with ambulation, ADLs and drives. Was recently discharged from [facility] on 8/9 and returned with vomiting and found to have early obstruction and being emergently taken to the OR. DME: Walker, cane and doesn't use. PCP Dr Floyd Butler. Patient is unable to designate anyone at this time to participate in his/her discharge plan and to receive any needed information.     Care Management Interventions  PCP Verified by CM: Yes(Dr Santillan)  Mode of Transport at Discharge: (Family)  Transition of Care Consult (CM Consult): Discharge Planning  Current Support Network: Lives with Spouse(Mother lives with patient)  Confirm Follow Up Transport: Family  Plan discussed with Pt/Family/Caregiver: Yes(Discussed with mother )  Discharge Location  Discharge Placement: Home    Jaelyn Marrero RN    Outcomes Manager  [phone]  [phone]

## 2019-08-10 NOTE — H&P
General Surgery Consult      [facility]  Admit date: 8/10/2019    MRN: [id number]     : 1960     Age: 61 y.o. Attending Physician: Fanta Carroll MD, FACS      Subjective:     Isis Eisenberg is a 61 y.o. female who is very well know to me. She was just discharged home yesterday after a 1 week hospital stay for a major laparotomy and lysis of adhesions and attempt at resection of left adnexal mass (That could not be found). She presented to the ER with significant abdominal pain with nausea an vomiting. In the ER she was afebrile and her BP was normal. She was tachycardic. Her labs showed a WBC of 13 K and normal creatinine. A CT scan showed a small bowel obstruction with a transition point and findings suggestive of internal hernia at the anastomosis site. She was seen by my partner in the ER, Dr. Dennis Marcial, who was also concerned about internal hernia with obstruction. So I decided to take for surgery STAT.     Patient Active Problem List    Diagnosis Date Noted    Anemia 08/10/2019    S/P small bowel resection 2019    Adnexal cyst 2019    Chronic pelvic pain in female 2019    Bowel obstruction (HCC) 2018    Non-intractable cyclical vomiting with nausea 2018    Type 2 diabetes mellitus with nephropathy ([address] 2018    Chronic abdominal pain 2017    Post-operative pain 2017    SBO (small bowel obstruction) (HCC) 2017    Osteoarthritis of left knee 2016    Hypertension 2016    Hyperlipidemia 2016    GERD (gastroesophageal reflux disease) 2016    Asthma 2016    Type 2 diabetes mellitus ([address] 2016    Sural neuritis 2014    Chest pain 2014    Essential hypertension, benign 2012    Generalized abdominal pain      Past Medical History:   Diagnosis Date    Abdominal adhesions     Adnexal cyst 2019    Left    Anemia     Asthma     Chronic pelvic pain in female 7/30/2019    Diabetes mellitus     Dyskinesia     bilateral    Essential hypertension     GERD (gastroesophageal reflux disease)     Hypertension     Menopause     Microhematuria     Rheumatoid arteritis 2018    Stool color black       Past Surgical History:   Procedure Laterality Date    COLONOSCOPY N/A 1/21/2019    COL[facility]COPY performed [facility] at [facility] ENDOSCOPY    HX CHOLECYSTECTOMY  6/28/10    HX COLONOSCOPY      HX ENDOSCOPY      HX HERNIA REPAIR      HX HYSTERECTOMY  1989    Fibroids    HX KNEE REPLACEMENT Left     HX KNEE REPLACEMENT Right 06/2018    HX OOPHORECTOMY  12/2000    HX ORTHOPAEDIC Right     ankle- multiple surgeries    HX SMALL BOWEL RESECTION  12/2000    HX SMALL BOWEL RESECTION  02/21/2017    Dr. Alphonse Deshpande        Social History     Tobacco Use    Smoking status: Never Smoker    Smokeless tobacco: Never Used   Substance Use Topics    Alcohol use: No      Social History     Tobacco Use   Smoking Status Never Smoker   Smokeless Tobacco Never Used     Family History   Problem Relation Age of Onset    Hypertension Other         parent    Breast Cancer Other 21    Heart Disease Father     Diabetes Father     Lung Disease Father     Diabetes Mother     Hypertension Other         sibling    Diabetes Other         parent    Kidney Disease Brother     Diabetes Brother     Lung Disease Brother       Current Facility-Administered Medications   Medication Dose Route Frequency    sodium chloride (NS) flush 5-10 mL  5-10 mL IntraVENous PRN    sodium chloride 0.9 % bolus infusion 1,000 mL  1,000 mL IntraVENous ONCE    Followed by    sodium chloride 0.9 % bolus infusion 97 mL  97 mL IntraVENous ONCE    benzocaine (HURRICAINE) 20 % spray   Mucous Membrane PRN    insulin lispro (HUMALOG) injection   SubCUTAneous AC&HS    glucose chewable tablet 16 g  4 Tab Oral PRN    glucagon (GLUCAGEN) injection 1 mg  1 mg IntraMUSCular PRN    dextrose 10 % infusion 125-250 mL  125-250 mL IntraVENous PRN     Current Outpatient Medications   Medication Sig    oxyCODONE-acetaminophen (PERCOCET) 5-325 mg per tablet Take 1 Tab by mouth every four (4) hours as needed for Pain for up to 3 days. Max Daily Amount: 6 Tabs.  cyclobenzaprine (FLEXERIL) 10 mg tablet Take 1 Tab by mouth three (3) times daily as needed for Muscle Spasm(s).  metFORMIN (GLUMETZA ER) 500 mg TG24 24 hour tablet Take 1,000 mg by mouth two (2) times a day. take 1 tablet by mouth twice a day    multivitamin (ONE A DAY) tablet Take 1 Tab by mouth daily.  ondansetron hcl (ZOFRAN) 4 mg tablet Take 1 Tab by mouth every eight (8) hours as needed for Nausea.  ALLERGY RELIEF, CETIRIZINE, 10 mg tablet take 1 tablet by mouth once daily    bisoprolol-hydroCHLOROthiazide (ZIAC) 2.5-6.25 mg per tablet Take 1 Tab by mouth daily.  doxepin (SINEQUAN) 10 mg capsule Take 1 Cap by mouth nightly.  omeprazole (PRILOSEC) 10 mg capsule take 1 capsule by mouth once daily    FARXIGA 10 mg tab tablet take 1 tablet by mouth once daily    amLODIPine (NORVASC) 2.5 mg tablet take 1 tablet by mouth once daily NIGHTLY    albuterol (PROVENTIL HFA, VENTOLIN HFA, PROAIR HFA) 90 mcg/actuation inhaler Take 1 Puff by inhalation every six (6) hours as needed for Wheezing.  folic acid (FOLVITE) 1 mg tablet Take  by mouth daily.  methotrexate (RHEUMATREX) 2.5 mg tablet Take 7.5 mg by mouth every Tuesday.  sulfaSALAzine (AZULFIDINE) 500 mg tablet Take 500 mg by mouth two (2) times a day.     glucose blood VI test strips (ONETOUCH ULTRA TEST) strip Check blood glucose as directed      Allergies   Allergen Reactions    Macrodantin [Nitrofurantoin Macrocrystalline] Itching and Other (comments)    Tape [Adhesive] Other (comments)     Paper tape-- feels like burning skin  Burned skin when had wound vac        Review of Systems:  Pertinent items are noted in the History of Present Illness. Objective:     Visit Vitals  /74   Pulse (!) 115   Temp 97.6 °F (36.4 °C)   Resp 20   Ht 5' 4\" (1.626 m)   Wt 69.9 kg (154 lb)   SpO2 97%   BMI 26.43 kg/m²       Physical Exam:      General:  alert, afebrile, cooperative and moderately ill   Eyes:  conjunctivae and sclerae normal, pupils equal, round, reactive to light               Abdomen:   rounded, soft but diffusely tender with no clear peritoneal signs. It is distended. Midline incision is clean. Imaging and Lab Review:     CBC:   Lab Results   Component Value Date/Time    WBC 13.5 (H) 08/10/2019 11:40 AM    RBC 2.97 (L) 08/10/2019 11:40 AM    HGB 7.9 (L) 08/10/2019 11:40 AM    HCT 25.4 (L) 08/10/2019 11:40 AM    PLATELET 765 (H) 09/45/8973 11:40 AM     BMP:   Lab Results   Component Value Date/Time    Glucose 151 (H) 08/10/2019 01:34 PM    Sodium 138 08/10/2019 01:34 PM    Potassium 3.0 (L) 08/10/2019 01:34 PM    Chloride 100 08/10/2019 01:34 PM    CO2 30 08/10/2019 01:34 PM    BUN 13 08/10/2019 01:34 PM    Creatinine 0.86 08/10/2019 01:34 PM    Calcium 8.7 08/10/2019 01:34 PM     CMP:  Lab Results   Component Value Date/Time    Glucose 151 (H) 08/10/2019 01:34 PM    Sodium 138 08/10/2019 01:34 PM    Potassium 3.0 (L) 08/10/2019 01:34 PM    Chloride 100 08/10/2019 01:34 PM    CO2 30 08/10/2019 01:34 PM    BUN 13 08/10/2019 01:34 PM    Creatinine 0.86 08/10/2019 01:34 PM    Calcium 8.7 08/10/2019 01:34 PM    Anion gap 8 08/10/2019 01:34 PM    BUN/Creatinine ratio 15 08/10/2019 01:34 PM    Alk.  phosphatase 137 (H) 08/10/2019 01:34 PM    Protein, total 6.4 08/10/2019 01:34 PM    Albumin 2.4 (L) 08/10/2019 01:34 PM    Globulin 4.0 08/10/2019 01:34 PM    A-G Ratio 0.6 (L) 08/10/2019 01:34 PM       Recent Results (from the past 24 hour(s))   CBC WITH AUTOMATED DIFF    Collection Time: 08/10/19 11:40 AM   Result Value Ref Range    WBC 13.5 (H) 4.6 - 13.2 K/uL    RBC 2.97 (L) 4.20 - 5.30 M/uL    HGB 7.9 (L) 12.0 - 16.0 g/dL    HCT 25.4 (L) 35.0 - 45.0 %    MCV 85.5 74.0 - 97.0 FL    MCH 26.6 24.0 - 34.0 PG    MCHC 31.1 31.0 - 37.0 g/dL    RDW 17.6 (H) 11.6 - 14.5 %    PLATELET 405 (H) 304 - 420 K/uL    MPV 9.9 9.2 - 11.8 FL    NEUTROPHILS 86 (H) 40 - 73 %    LYMPHOCYTES 8 (L) 21 - 52 %    MONOCYTES 6 3 - 10 %    EOSINOPHILS 0 0 - 5 %    BASOPHILS 0 0 - 2 %    ABS. NEUTROPHILS 11.6 (H) 1.8 - 8.0 K/UL    ABS. LYMPHOCYTES 1.1 0.9 - 3.6 K/UL    ABS. MONOCYTES 0.9 0.05 - 1.2 K/UL    ABS. EOSINOPHILS 0.0 0.0 - 0.4 K/UL    ABS. BASOPHILS 0.0 0.0 - 0.1 K/UL    DF AUTOMATED     CULTURE, BLOOD    Collection Time: 08/10/19 11:40 AM   Result Value Ref Range    Special Requests: PERIPHERAL      Culture result: PENDING    HEMOGLOBIN A1C WITH EAG    Collection Time: 08/10/19 11:40 AM   Result Value Ref Range    Hemoglobin A1c 6.6 (H) 4.2 - 5.6 %    Est. average glucose 143 mg/dL   POC LACTIC ACID    Collection Time: 08/10/19 11:52 AM   Result Value Ref Range    Lactic Acid (POC) 1.14 0.40 - 2.00 mmol/L   CULTURE, BLOOD    Collection Time: 08/10/19 12:00 PM   Result Value Ref Range    Special Requests: PERIPHERAL      Culture result: PENDING    LIPASE    Collection Time: 08/10/19  1:34 PM   Result Value Ref Range    Lipase 131 73 - 768 U/L   METABOLIC PANEL, COMPREHENSIVE    Collection Time: 08/10/19  1:34 PM   Result Value Ref Range    Sodium 138 136 - 145 mmol/L    Potassium 3.0 (L) 3.5 - 5.5 mmol/L    Chloride 100 100 - 111 mmol/L    CO2 30 21 - 32 mmol/L    Anion gap 8 3.0 - 18 mmol/L    Glucose 151 (H) 74 - 99 mg/dL    BUN 13 7.0 - 18 MG/DL    Creatinine 0.86 0.6 - 1.3 MG/DL    BUN/Creatinine ratio 15 12 - 20      GFR est AA >60 >60 ml/min/1.73m2    GFR est non-AA >60 >60 ml/min/1.73m2    Calcium 8.7 8.5 - 10.1 MG/DL    Bilirubin, total 0.8 0.2 - 1.0 MG/DL    ALT (SGPT) 13 13 - 56 U/L    AST (SGOT) 11 10 - 38 U/L    Alk.  phosphatase 137 (H) 45 - 117 U/L    Protein, total 6.4 6.4 - 8.2 g/dL    Albumin 2.4 (L) 3.4 - 5.0 g/dL    Globulin 4.0 2.0 - 4.0 g/dL A-G Ratio 0.6 (L) 0.8 - 1.7         images and reports reviewed    Assessment:   Jose Pearson is a 61 y.o. female is presenting with abdominal pain and a picture of bowel obstruction about 1 week after a major laparotomy and lysis of adhesions. Her CT scan findings of SBO with a transition point with picture of internal hernia, is very concerning and I do believe that she needs urgent laparotomy to fix it. I spoke with the patient and her mother, and I called the OR. Plan:     Urgent laparotomy with reduction of internal hernia. Diet: NPO  IVF for hydration  NG tube placement for decompression to low intermittent suction. They tried in the ER. Will place it during the surgery and I spoke with anesthesia and will do rapid sequent intubation to minimize the risk of aspiration.    DVT prophylaxis  Ambulation as tolerated    Please call me if you have any questions (cell phone: [phone])     Signed By: Zoraida Allen MD     August 10, 2019

## 2019-08-10 NOTE — ED NOTES
Unable to place NGT after multiple attempts even after medication. Patient continuously vomiting thru procedure and unable to advance past the the mouth. Dr Vivia Koyanagi at bedside. Order to not place NGT secondary for patient is now going to surgery. Dr Vivia Koyanagi explaining to family and patient. Dr Lashanda Bolden called and stated he is  about 15 minutes away. Family updated on that arrival time.

## 2019-08-10 NOTE — ED NOTES
Dr Millie Hankins at bedside speaking to family and patient. Patient able to sign consent for surgery. States she understands the importance of going.   Waiting on OR team.

## 2019-08-10 NOTE — PERIOP NOTES
PACU Summary  Patient arrived to PACU at 1646  Bedside/Verbal report received from IFRAH Rodriguez RN and SWETA Foreman CRNA  Vitals:    08/10/19 1720 08/10/19 1725 08/10/19 1729 08/10/19 1730   BP: 138/74 130/76  136/72   Pulse: (!) 105 (!) 104 (!) 103 (!) 106   Resp: 27 30 (!) 31 30   Temp: 97.8 °F (36.6 °C)      SpO2: 92% 92% 92% 91%   Weight:       Height:         Cardiac rhythm: Sinus Tachycardia     Lines and Drains  Peripheral Intravenous Line:   Peripheral IV 08/10/19 Left;Right Antecubital (Active)       Peripheral IV 08/10/19 Left  (Active)   Site Assessment Clean, dry, & intact 8/10/2019  4:46 PM   Phlebitis Assessment 0 8/10/2019  4:46 PM   Infiltration Assessment 0 8/10/2019  4:46 PM   Dressing Status Clean, dry, & intact 8/10/2019  4:46 PM   Dressing Type Transparent 8/10/2019  4:46 PM   Hub Color/Line Status Green; Infusing 8/10/2019  4:46 PM   Alcohol Cap Used Yes 8/10/2019  4:46 PM    and Drain(s):   Priscella Eddy #1 08/10/19 Anterior;Right Abdomen (Active)   Site Assessment Clean, dry, & intact 8/10/2019  4:46 PM   Dressing Status Clean, dry, & intact 8/10/2019  4:46 PM   Drainage Description Sanguinous 8/10/2019  4:46 PM   Arnulfo Drain Airleak No 8/10/2019  4:46 PM   Status Patent; Charged 8/10/2019  4:46 PM   Output (ml) 45 ml 8/10/2019  4:46 PM       Wound  Wound Abdomen (Active)   Dressing Status Intact; New drainage 8/10/2019  4:46 PM   Dressing Type 4 x 4;Special tape (comment); Staples 8/10/2019  4:46 PM   Incision Site Well Approximated Yes 8/10/2019  4:33 PM   Drainage Amount Small 8/10/2019  4:46 PM   Drainage Color Serosanguinous 8/10/2019  4:46 PM   Number of days: 0       [REMOVED] Wound Thigh Left (Removed)   Number of days: 225       [REMOVED] Wound Knee Left (Removed)   Number of days: 225       [REMOVED] Wound Abdomen (Removed)   Number of days: 8        Intake and Output    Intake/Output Summary (Last 24 hours) at 8/10/2019 1733  Last data filed at 8/10/2019 1652  Gross per 24 hour   Intake 5400 ml Output 195 ml   Net 5205 ml     Stat KUB and Chest x-ray completed, chest film reviewed by anesthesia at the bedside, no immediate concerns reported. Report called to KAMI Gandhi in 2200 at [employer] , RN expressed concerns over potassium of 3.0 and was advised to consult with hospitalist about any ongoing/continuing interventions required outside of the immediate recovery period including maintenance fluid and electrolyte replacement plans. 5:59 PM  nursing supervisor at bedside to place an additional IV    Patient transported to 2206 at 1815. Family notified of transfer and patient room number.     Osiel Hagen RN

## 2019-08-10 NOTE — ED TRIAGE NOTES
\"I just got out of the hospital yesterday. I keep throwing up bile. I have staples in stomach, I had surgery on my bowels. \"

## 2019-08-10 NOTE — ANESTHESIA PREPROCEDURE EVALUATION
Relevant Problems   No relevant active problems       Anesthetic History     PONV          Review of Systems / Medical History  Patient summary reviewed, nursing notes reviewed and pertinent labs reviewed    Pulmonary            Asthma : well controlled       Neuro/Psych   Within defined limits           Cardiovascular    Hypertension: well controlled              Exercise tolerance: >4 METS     GI/Hepatic/Renal     GERD           Endo/Other    Diabetes    Obesity and arthritis     Other Findings                   Anesthetic Plan    ASA: 3  Anesthesia type: general          Induction: Intravenous  Anesthetic plan and risks discussed with: Patient

## 2019-08-10 NOTE — ED NOTES
I performed a brief history of the patient here in triage and I have determined that pt will need further treatment and evaluation from the main side ER physician. I have placed initial orders based on the history to help in expediting patients care.        Visit Vitals  /88   Pulse (!) 121   Temp 99.3 °F (37.4 °C)   Resp 21   Ht 5' 4\" (1.626 m)   Wt 69.9 kg (154 lb)   SpO2 96%   BMI 26.43 kg/m²      GUILLERMO Song 10:38 AM

## 2019-08-10 NOTE — PROGRESS NOTES
1710 Transfer report given to this nurse by Fernanda Ricci from PACU.      1949 Patient arrived on unit on stretcher. Patient breathing is labored Respirations are in 30's. Surgical site to abdomen has bloody drainage. Patient has a BOBBY drain to RL abdomen with serous sanguinous fluidPatient has cast to RUE. NG tube to right nare is in place tape marked at 67.       1940. Bedside and Verbal shift change report given to Delmy Goodson (oncoming nurse) by this nurse Babatunde Cuello (offgoing nurse). Report included the following information SBAR, Kardex and MAR.

## 2019-08-10 NOTE — BRIEF OP NOTE
BRIEF OPERATIVE NOTE    Date of Procedure: 8/10/2019   Preoperative Diagnosis: Other impaction of intestine ([facility] [K56.49]  Postoperative Diagnosis: Other impaction of intestine ([facility] [K56.49]    Procedure(s):  LAPAROTOMY EXPLORATORY. Extensive lysis of adhesions. Small bowel resection. Drain placement. Surgeon(s) and Role:     * Jace Flaherty MD - Primary  Surgical Staff:  Circ-1: Dk Morales RN  Circ-2: Chey Syed Tech-1: Gin Young  Surg Asst-1: Shelbi Kahn  Event Time In Time Out   Incision Start 1527    Incision Close 1633      Anesthesia: General   Estimated Blood Loss: Minimal.  Specimens:   ID Type Source Tests Collected by Time Destination   1 : small bowel Preservative   Jace Flaherty MD 8/10/2019 1604 Pathology      Findings: Sever adhesions. Complications: None.    Implants: * No implants in log *

## 2019-08-11 LAB
ANION GAP SERPL CALC-SCNC: 8 MMOL/L (ref 3–18)
ATRIAL RATE: 120 BPM
BASOPHILS # BLD: 0 K/UL (ref 0–0.1)
BASOPHILS # BLD: 0 K/UL (ref 0–0.1)
BASOPHILS NFR BLD: 0 % (ref 0–2)
BASOPHILS NFR BLD: 0 % (ref 0–2)
BUN SERPL-MCNC: 15 MG/DL (ref 7–18)
BUN/CREAT SERPL: 21 (ref 12–20)
CALCIUM SERPL-MCNC: 7.7 MG/DL (ref 8.5–10.1)
CALCULATED P AXIS, ECG09: 51 DEGREES
CALCULATED R AXIS, ECG10: 48 DEGREES
CALCULATED T AXIS, ECG11: 147 DEGREES
CHLORIDE SERPL-SCNC: 103 MMOL/L (ref 100–111)
CO2 SERPL-SCNC: 29 MMOL/L (ref 21–32)
CREAT SERPL-MCNC: 0.73 MG/DL (ref 0.6–1.3)
DIAGNOSIS, 93000: NORMAL
DIFFERENTIAL METHOD BLD: ABNORMAL
DIFFERENTIAL METHOD BLD: ABNORMAL
EOSINOPHIL # BLD: 0 K/UL (ref 0–0.4)
EOSINOPHIL # BLD: 0.1 K/UL (ref 0–0.4)
EOSINOPHIL NFR BLD: 0 % (ref 0–5)
EOSINOPHIL NFR BLD: 0 % (ref 0–5)
ERYTHROCYTE [DISTWIDTH] IN BLOOD BY AUTOMATED COUNT: 15.5 % (ref 11.6–14.5)
ERYTHROCYTE [DISTWIDTH] IN BLOOD BY AUTOMATED COUNT: 17.2 % (ref 11.6–14.5)
GLUCOSE BLD STRIP.AUTO-MCNC: 114 MG/DL (ref 70–110)
GLUCOSE BLD STRIP.AUTO-MCNC: 124 MG/DL (ref 70–110)
GLUCOSE BLD STRIP.AUTO-MCNC: 165 MG/DL (ref 70–110)
GLUCOSE BLD STRIP.AUTO-MCNC: 168 MG/DL (ref 70–110)
GLUCOSE BLD STRIP.AUTO-MCNC: 168 MG/DL (ref 70–110)
GLUCOSE SERPL-MCNC: 152 MG/DL (ref 74–99)
HCT VFR BLD AUTO: 20.9 % (ref 35–45)
HCT VFR BLD AUTO: 27.9 % (ref 35–45)
HGB BLD-MCNC: 6.6 G/DL (ref 12–16)
HGB BLD-MCNC: 8.9 G/DL (ref 12–16)
LYMPHOCYTES # BLD: 0.9 K/UL (ref 0.9–3.6)
LYMPHOCYTES # BLD: 1 K/UL (ref 0.9–3.6)
LYMPHOCYTES NFR BLD: 7 % (ref 21–52)
LYMPHOCYTES NFR BLD: 7 % (ref 21–52)
MCH RBC QN AUTO: 26.9 PG (ref 24–34)
MCH RBC QN AUTO: 27.6 PG (ref 24–34)
MCHC RBC AUTO-ENTMCNC: 31.6 G/DL (ref 31–37)
MCHC RBC AUTO-ENTMCNC: 31.9 G/DL (ref 31–37)
MCV RBC AUTO: 85.3 FL (ref 74–97)
MCV RBC AUTO: 86.6 FL (ref 74–97)
MONOCYTES # BLD: 0.9 K/UL (ref 0.05–1.2)
MONOCYTES # BLD: 1 K/UL (ref 0.05–1.2)
MONOCYTES NFR BLD: 7 % (ref 3–10)
MONOCYTES NFR BLD: 8 % (ref 3–10)
NEUTS SEG # BLD: 10.4 K/UL (ref 1.8–8)
NEUTS SEG # BLD: 11 K/UL (ref 1.8–8)
NEUTS SEG NFR BLD: 85 % (ref 40–73)
NEUTS SEG NFR BLD: 86 % (ref 40–73)
P-R INTERVAL, ECG05: 130 MS
PLATELET # BLD AUTO: 309 K/UL (ref 135–420)
PLATELET # BLD AUTO: 380 K/UL (ref 135–420)
PMV BLD AUTO: 8.8 FL (ref 9.2–11.8)
PMV BLD AUTO: 9.1 FL (ref 9.2–11.8)
POTASSIUM SERPL-SCNC: 3.5 MMOL/L (ref 3.5–5.5)
Q-T INTERVAL, ECG07: 318 MS
QRS DURATION, ECG06: 76 MS
QTC CALCULATION (BEZET), ECG08: 449 MS
RBC # BLD AUTO: 2.45 M/UL (ref 4.2–5.3)
RBC # BLD AUTO: 3.22 M/UL (ref 4.2–5.3)
SODIUM SERPL-SCNC: 140 MMOL/L (ref 136–145)
VENTRICULAR RATE, ECG03: 120 BPM
WBC # BLD AUTO: 12.4 K/UL (ref 4.6–13.2)
WBC # BLD AUTO: 12.9 K/UL (ref 4.6–13.2)

## 2019-08-11 PROCEDURE — 36415 COLL VENOUS BLD VENIPUNCTURE: CPT

## 2019-08-11 PROCEDURE — 85025 COMPLETE CBC W/AUTO DIFF WBC: CPT

## 2019-08-11 PROCEDURE — 86900 BLOOD TYPING SEROLOGIC ABO: CPT

## 2019-08-11 PROCEDURE — 74011250636 HC RX REV CODE- 250/636: Performed by: SURGERY

## 2019-08-11 PROCEDURE — 51798 US URINE CAPACITY MEASURE: CPT

## 2019-08-11 PROCEDURE — 86923 COMPATIBILITY TEST ELECTRIC: CPT

## 2019-08-11 PROCEDURE — 65270000029 HC RM PRIVATE

## 2019-08-11 PROCEDURE — 74011250637 HC RX REV CODE- 250/637: Performed by: SURGERY

## 2019-08-11 PROCEDURE — P9016 RBC LEUKOCYTES REDUCED: HCPCS

## 2019-08-11 PROCEDURE — 36430 TRANSFUSION BLD/BLD COMPNT: CPT

## 2019-08-11 PROCEDURE — 82962 GLUCOSE BLOOD TEST: CPT

## 2019-08-11 PROCEDURE — 77030037877 HC DRSG MEPILEX >48IN BORD MOLN -A

## 2019-08-11 PROCEDURE — 74011636637 HC RX REV CODE- 636/637: Performed by: SURGERY

## 2019-08-11 PROCEDURE — 77010033678 HC OXYGEN DAILY

## 2019-08-11 PROCEDURE — 80048 BASIC METABOLIC PNL TOTAL CA: CPT

## 2019-08-11 RX ORDER — DIPHENHYDRAMINE HCL 25 MG
25 CAPSULE ORAL
Status: DISCONTINUED | OUTPATIENT
Start: 2019-08-11 | End: 2019-08-11

## 2019-08-11 RX ORDER — MAGNESIUM SULFATE 100 %
4 CRYSTALS MISCELLANEOUS AS NEEDED
Status: DISCONTINUED | OUTPATIENT
Start: 2019-08-11 | End: 2019-08-22 | Stop reason: HOSPADM

## 2019-08-11 RX ORDER — SODIUM CHLORIDE 9 MG/ML
1000 INJECTION, SOLUTION INTRAVENOUS ONCE
Status: COMPLETED | OUTPATIENT
Start: 2019-08-11 | End: 2019-08-11

## 2019-08-11 RX ORDER — LIDOCAINE HYDROCHLORIDE 20 MG/ML
15 SOLUTION OROPHARYNGEAL AS NEEDED
Status: DISCONTINUED | OUTPATIENT
Start: 2019-08-11 | End: 2019-08-22 | Stop reason: HOSPADM

## 2019-08-11 RX ORDER — HYDROMORPHONE HYDROCHLORIDE 1 MG/ML
0.5 INJECTION, SOLUTION INTRAMUSCULAR; INTRAVENOUS; SUBCUTANEOUS
Status: DISCONTINUED | OUTPATIENT
Start: 2019-08-11 | End: 2019-08-12

## 2019-08-11 RX ORDER — SODIUM CHLORIDE 9 MG/ML
250 INJECTION, SOLUTION INTRAVENOUS AS NEEDED
Status: DISCONTINUED | OUTPATIENT
Start: 2019-08-11 | End: 2019-08-12

## 2019-08-11 RX ORDER — DIPHENHYDRAMINE HYDROCHLORIDE 50 MG/ML
25 INJECTION, SOLUTION INTRAMUSCULAR; INTRAVENOUS
Status: DISCONTINUED | OUTPATIENT
Start: 2019-08-11 | End: 2019-08-22 | Stop reason: HOSPADM

## 2019-08-11 RX ORDER — INSULIN LISPRO 100 [IU]/ML
INJECTION, SOLUTION INTRAVENOUS; SUBCUTANEOUS EVERY 6 HOURS
Status: DISCONTINUED | OUTPATIENT
Start: 2019-08-11 | End: 2019-08-11

## 2019-08-11 RX ORDER — INSULIN LISPRO 100 [IU]/ML
INJECTION, SOLUTION INTRAVENOUS; SUBCUTANEOUS EVERY 6 HOURS
Status: DISCONTINUED | OUTPATIENT
Start: 2019-08-11 | End: 2019-08-22 | Stop reason: HOSPADM

## 2019-08-11 RX ADMIN — KETOROLAC TROMETHAMINE 30 MG: 30 INJECTION, SOLUTION INTRAMUSCULAR at 12:41

## 2019-08-11 RX ADMIN — INSULIN LISPRO 2 UNITS: 100 INJECTION, SOLUTION INTRAVENOUS; SUBCUTANEOUS at 06:31

## 2019-08-11 RX ADMIN — DIPHENHYDRAMINE HYDROCHLORIDE 25 MG: 50 INJECTION, SOLUTION INTRAMUSCULAR; INTRAVENOUS at 23:24

## 2019-08-11 RX ADMIN — HYDROMORPHONE HYDROCHLORIDE 1 MG: 1 INJECTION, SOLUTION INTRAMUSCULAR; INTRAVENOUS; SUBCUTANEOUS at 08:57

## 2019-08-11 RX ADMIN — HYDROMORPHONE HYDROCHLORIDE 0.5 MG: 1 INJECTION, SOLUTION INTRAMUSCULAR; INTRAVENOUS; SUBCUTANEOUS at 22:21

## 2019-08-11 RX ADMIN — HYDROMORPHONE HYDROCHLORIDE 1 MG: 1 INJECTION, SOLUTION INTRAMUSCULAR; INTRAVENOUS; SUBCUTANEOUS at 13:33

## 2019-08-11 RX ADMIN — INSULIN LISPRO 2 UNITS: 100 INJECTION, SOLUTION INTRAVENOUS; SUBCUTANEOUS at 12:45

## 2019-08-11 RX ADMIN — PHENOL 1 SPRAY: 1.5 LIQUID ORAL at 13:35

## 2019-08-11 RX ADMIN — HYDROMORPHONE HYDROCHLORIDE 0.5 MG: 1 INJECTION, SOLUTION INTRAMUSCULAR; INTRAVENOUS; SUBCUTANEOUS at 19:28

## 2019-08-11 RX ADMIN — DIPHENHYDRAMINE HYDROCHLORIDE 25 MG: 50 INJECTION, SOLUTION INTRAMUSCULAR; INTRAVENOUS at 16:58

## 2019-08-11 RX ADMIN — SODIUM CHLORIDE, SODIUM LACTATE, POTASSIUM CHLORIDE, AND CALCIUM CHLORIDE 100 ML/HR: 600; 310; 30; 20 INJECTION, SOLUTION INTRAVENOUS at 16:59

## 2019-08-11 RX ADMIN — SODIUM CHLORIDE, SODIUM LACTATE, POTASSIUM CHLORIDE, AND CALCIUM CHLORIDE 100 ML/HR: 600; 310; 30; 20 INJECTION, SOLUTION INTRAVENOUS at 06:23

## 2019-08-11 RX ADMIN — HYDROMORPHONE HYDROCHLORIDE 1 MG: 1 INJECTION, SOLUTION INTRAMUSCULAR; INTRAVENOUS; SUBCUTANEOUS at 15:39

## 2019-08-11 RX ADMIN — SODIUM CHLORIDE, SODIUM LACTATE, POTASSIUM CHLORIDE, AND CALCIUM CHLORIDE 100 ML/HR: 600; 310; 30; 20 INJECTION, SOLUTION INTRAVENOUS at 20:43

## 2019-08-11 RX ADMIN — SODIUM CHLORIDE 1000 ML: 900 INJECTION, SOLUTION INTRAVENOUS at 00:34

## 2019-08-11 RX ADMIN — HYDROMORPHONE HYDROCHLORIDE 1 MG: 1 INJECTION, SOLUTION INTRAMUSCULAR; INTRAVENOUS; SUBCUTANEOUS at 02:14

## 2019-08-11 RX ADMIN — KETOROLAC TROMETHAMINE 30 MG: 30 INJECTION, SOLUTION INTRAMUSCULAR at 18:30

## 2019-08-11 RX ADMIN — KETOROLAC TROMETHAMINE 30 MG: 30 INJECTION, SOLUTION INTRAMUSCULAR at 05:06

## 2019-08-11 RX ADMIN — HYDROMORPHONE HYDROCHLORIDE 0.5 MG: 1 INJECTION, SOLUTION INTRAMUSCULAR; INTRAVENOUS; SUBCUTANEOUS at 16:59

## 2019-08-11 NOTE — PROGRESS NOTES
Surgery    Events of yesterday and last night reviewed. Adhesive obstruction without internal hernia found    Since with tachycardia and post op blood loss anemia. Receiving 2nd unit PRBC now. Feels well, much better than yesterday.     Patient Vitals for the past 12 hrs:   Temp Pulse Resp BP SpO2   08/11/19 1033 98 °F (36.7 °C) (!) 108 22 108/66 100 %   08/11/19 0929 98.6 °F (37 °C) (!) 105 (!) 32 117/76 100 %   08/11/19 0828 97.8 °F (36.6 °C) (!) 109 26 102/53 100 %   08/11/19 0726 98 °F (36.7 °C) (!) 107 24 103/59 100 %   08/11/19 0636 97.9 °F (36.6 °C) (!) 106 20 101/65 100 %   08/11/19 0603 98.6 °F (37 °C) (!) 106 20 111/70 100 %   08/11/19 0540 98.2 °F (36.8 °C) (!) 108 20 94/55 100 %   08/11/19 0509 98.1 °F (36.7 °C) (!) 106 20 111/64 100 %   08/11/19 0415 97.8 °F (36.6 °C) (!) 103 22 127/68 99 %   08/10/19 2342  (!) 114      08/10/19 2326 98.3 °F (36.8 °C) (!) 109 22 121/75 100 %     Date 08/10/19 0700 - 08/11/19 0659 08/11/19 0700 - 08/12/19 0659   Shift 2574-4383 6712-8921 24 Hour Total 6303-9992 0814-2751 24 Hour Total   INTAKE   I.V.(mL/kg/hr) 5400(6.4) 875(1) 6275(3.7)        I.V. 2700  2700        Volume (0.9% sodium chloride infusion) 2700  2700        Volume (lactated Ringers infusion)  875 875        Volume (0.9% sodium chloride infusion 1,000 mL)  0 0        Volume (dextrose 10 % infusion 125-250 mL)  0 0        Volume (0.9% sodium chloride infusion 250 mL)  0 0      Blood    310  310     Volume (TRANSFUSE PACKED RBC'S)    310  310   NG/GT  60 60        Water Flush Volume (mL) (Nasogastric Tube 08/10/19)  60 60      Shift Total(mL/kg) 5400(77.3) 935(13.4) 6335(90.7) 310(4.4)  310(4.4)   OUTPUT   Urine(mL/kg/hr) 180(0.2) 400(0.5) 580(0.3)        Urine Output 100  100        Urine Output (mL) (Urinary Catheter 08/10/19 [place]) 80 400 480      Emesis/NG output  900 900        Output (ml) (Nasogastric Tube 08/10/19)  900 900      Drains 75 130 205        Output (ml) Lakhwinder Elise #1 08/10/19 Anterior;Right Abdomen) 75 130 205      Blood 50  50        Estimated Blood Loss 50  50      Shift Total(mL/kg) 305(4.4) 1430(20.5) 1735(24.8)      NET 5095 -604 4730 310  310   Weight (kg) 69.9 69.9 69.9 69.9 69.9 69.9     Drain - very thin serosang output. Wound intact and dressed    Abd - soft, dist, appr TTP    Recent Results (from the past 12 hour(s))   GLUCOSE, POC    Collection Time: 08/11/19 12:23 AM   Result Value Ref Range    Glucose (POC) 168 (H) 70 - 110 mg/dL   CBC WITH AUTOMATED DIFF    Collection Time: 08/11/19 12:30 AM   Result Value Ref Range    WBC 12.9 4.6 - 13.2 K/uL    RBC 2.45 (L) 4.20 - 5.30 M/uL    HGB 6.6 (L) 12.0 - 16.0 g/dL    HCT 20.9 (L) 35.0 - 45.0 %    MCV 85.3 74.0 - 97.0 FL    MCH 26.9 24.0 - 34.0 PG    MCHC 31.6 31.0 - 37.0 g/dL    RDW 17.2 (H) 11.6 - 14.5 %    PLATELET 901 277 - 830 K/uL    MPV 9.1 (L) 9.2 - 11.8 FL    NEUTROPHILS 86 (H) 40 - 73 %    LYMPHOCYTES 7 (L) 21 - 52 %    MONOCYTES 7 3 - 10 %    EOSINOPHILS 0 0 - 5 %    BASOPHILS 0 0 - 2 %    ABS. NEUTROPHILS 11.0 (H) 1.8 - 8.0 K/UL    ABS. LYMPHOCYTES 1.0 0.9 - 3.6 K/UL    ABS. MONOCYTES 0.9 0.05 - 1.2 K/UL    ABS. EOSINOPHILS 0.0 0.0 - 0.4 K/UL    ABS.  BASOPHILS 0.0 0.0 - 0.1 K/UL    DF AUTOMATED     METABOLIC PANEL, BASIC    Collection Time: 08/11/19 12:30 AM   Result Value Ref Range    Sodium 140 136 - 145 mmol/L    Potassium 3.5 3.5 - 5.5 mmol/L    Chloride 103 100 - 111 mmol/L    CO2 29 21 - 32 mmol/L    Anion gap 8 3.0 - 18 mmol/L    Glucose 152 (H) 74 - 99 mg/dL    BUN 15 7.0 - 18 MG/DL    Creatinine 0.73 0.6 - 1.3 MG/DL    BUN/Creatinine ratio 21 (H) 12 - 20      GFR est AA >60 >60 ml/min/1.73m2    GFR est non-AA >60 >60 ml/min/1.73m2    Calcium 7.7 (L) 8.5 - 10.1 MG/DL   TYPE + CROSSMATCH    Collection Time: 08/11/19  2:00 AM   Result Value Ref Range    Crossmatch Expiration 08/14/2019     ABO/Rh(D) O POSITIVE     Antibody screen NEG     CALLED TO: CHRISTINE KELLER 2200 UNIT ON 08/11/2019 [facility]      Unit number [id number]     Blood component type  LR     Unit division 00     Status of unit ISSUED     Crossmatch result Compatible     Unit number [id number]     Blood component type  LR     Unit division 00     Status of unit ISSUED     Crossmatch result Compatible    GLUCOSE, POC    Collection Time: 08/11/19  6:16 AM   Result Value Ref Range    Glucose (POC) 168 (H) 70 - 110 mg/dL     Imp: POD 1 for early postop SBO with severe adhesive disease    Looks good now but with post op blood loss anemia. Will Keep NPO for now    Will cehck Hgb at noon. If ok watch closely. If did not respond appropriately, transfuse again.

## 2019-08-11 NOTE — ROUTINE PROCESS
Chart open for mews score 3, LINSEY MCCLURE , states DR Alyce Lawler aware k 3, and resp rate in upper 20's and heart rate in low 100's, will recheck potassium level in am, and Linsey mcclure will check vital signs again soon. will monitor. [id number] advised LINSEY MCCLURE  To let DR Colleen Tillman KNOW THAT  The hospitalist has not written an order or a note, that he needs to be consulted. , and to give him a update on current vitals, Teofilo Coates RN nursing supervisor aware of above. 0848 reviewing chart for mews 3. Will continue to monitor.

## 2019-08-11 NOTE — PROGRESS NOTES
Problem: Patient Education: Go to Patient Education Activity  Goal: Patient/Family Education  Outcome: Progressing Towards Goal     Problem: Falls - Risk of  Goal: *Absence of Falls  Description  Document Loreli Hunger Fall Risk and appropriate interventions in the flowsheet. Outcome: Progressing Towards Goal  Note:   Fall Risk Interventions:  Mobility Interventions: Patient to call before getting OOB         Medication Interventions: Patient to call before getting OOB, Evaluate medications/consider consulting pharmacy    Elimination Interventions: Call light in reach              Problem: Patient Education: Go to Patient Education Activity  Goal: Patient/Family Education  Outcome: Progressing Towards Goal     Problem: Pressure Injury - Risk of  Goal: *Prevention of pressure injury  Description  Document Cristi Scale and appropriate interventions in the flowsheet.   Outcome: Progressing Towards Goal  Note:   Pressure Injury Interventions:  Sensory Interventions: Assess changes in LOC         Activity Interventions: Increase time out of bed    Mobility Interventions: HOB 30 degrees or less    Nutrition Interventions: Document food/fluid/supplement intake    Friction and Shear Interventions: HOB 30 degrees or less                Problem: Patient Education: Go to Patient Education Activity  Goal: Patient/Family Education  Outcome: Progressing Towards Goal     Problem: Pain  Goal: *Control of Pain  Outcome: Progressing Towards Goal     Problem: Patient Education: Go to Patient Education Activity  Goal: Patient/Family Education  Outcome: Progressing Towards Goal

## 2019-08-11 NOTE — PROGRESS NOTES
Bedside and Verbal shift change report given to Mela Bello RN (oncoming nurse) by Mitul Staples RN (offgoing nurse). Report included the following information SBAR, Kardex, Intake/Output and MAR. Laying in the bed tachypneic w/HR in 100s per report MD has been aware. Surgical site to ABD dressinng w/serosang marked for further monitoring call bell within reach will continue to monitor closely. 2050 Sleeping soundly remain tachypneic upon awake NG tube to wall suction draining will continue to monitor closely. 2344 Scheduled Toradol for pain respiration improved HR noted 110s will continue to monitor closely and update MD as needed. 0025 MD Cam Fleming updated new order for stat cbc/bmp, bolus 1 L NS and blood glucose check q 6. Order initiated. 0123 MD Cam Fleming called back after reviewing lab result order given transfuse 2 units of packed RBC order initiated. 0140 Fluid bolus completed pt resting call bell within reach will continue to monitor. 26 Medicated for pain refer to mar. NGT tube out put increased post bolus with no change to urinary out put bladder scan shows no residual will continue to monitor and intervene as needed. 8683 Blood transfusion started, will continue to monitor closely. 5597 Continue tolerating transfusion w/out s/sx of adverse reaction. Bedside and Verbal shift change report given to Mitul Staples RN (oncoming nurse) by Mela Bello RN (offgoing nurse). Report included the following information SBAR, Kardex, Intake/Output, MAR and Recent Results.

## 2019-08-11 NOTE — OP NOTES
[facility]  OPERATIVE REPORT    Name:  Karen Razo  MR#:   [id number]  :  1960  ACCOUNT #:  [de-identified]  DATE OF SERVICE:  08/10/2019    PREOPERATIVE DIAGNOSIS:  Small bowel obstruction (possibly due to an internal hernia according to CT scan). POSTOPERATIVE DIAGNOSIS:  Small bowel obstruction secondary to severe adhesions (No internal hernia(.    PROCEDURES PERFORMED:  1. Exploratory laparotomy with extensive lysis of adhesions accounting for more than 50% of the time of the surgery. 2.  Small bowel resection with primary anastomosis. 3.  Placement of a BOBBY drain. SURGEON:  Mitchel Henderson MD    ASSISTANT:  Gabriel Echevarria    ANESTHESIA:  General.    COMPLICATIONS:  None. SPECIMENS REMOVED:  Small bowel. IMPLANTS:  None. ESTIMATED BLOOD LOSS:  Minimal.    DETAILS OF PROCEDURE:  The patient was brought to operating room. Anesthesia was induced. Anesthesia had a very difficult time inserting IV on the patient. They tried about 5 to 6 times to place an IV and at the end, a proper IV was in and they started induction of anesthesia; however, it turned out that the IV was also infiltrated, but they already were attempting to intubate the patient. However, the patient did vomit bile and there was question if she aspiration. So at this point they placed an external jugular vein IV and then they were able to give medication and then they did the intubation under direct sterilization. There was some bile in the mouth, but they stated the trachea looked clean and there was no evidence of aspiration. However, a decision was made that we will get a chest x-ray in the recovery room. After induction of anesthesia, scrubbing and draping of the abdomen was done in usual manner after we removed the staples from the recent laparotomy. At this point, a time-out was performed and I opened the laparotomy at the same sites where the previous laparotomy was performed.   The suture was identified from the #1 PDS from the previous closure. This was divided and the old sutures were removed and then surprisingly, when we entered the abdomen there was severe adhesion between the bowel and the abdominal wall as well as within the bowel loops themselves. The adhesions were similar to the adhesion I faced 8 days ago when I did her laparotomy and it was surprising to me because it was very acute formation of adhesion. Though there was some bowel distention, but it was not very severe. At this point after I defined the bowel, I was not even able to identify a normal bowel where I can start the lysis of adhesions. So meticulous lysis of adhesion was performed and surprisingly again the adhesions were very hard and very difficult to manipulate. So first when I tried to start from the ligament of Treitz, I was unable to identify any anatomical landmark and I was afraid of causing any bowel injury. So I started in the pelvis again and also there was severe adhesions in the pelvic. I was not able to dissect in this area, so I moved to the right lower quadrant. I was able to identify the right colon and from there I moved backwards towards the small bowel and I was able to dissect at the small bowel. Again, the adhesion was so severe that I was able to take them either with Metzenbaum or with my fingers and then I was unable to identify the anastomosis. There was some discrepancy between the proximal and distal side of the anastomosis, but it was not extremely severe. However, the adhesions were so severe in this area again that I was not able to manipulate them and relieve the kinking at the obstruction site because of what seems to be severe adhesion. So at that point, I made the decision to resect that part of the small bowel.   So JESSICA blue load was used and I fired JESSICA blue load at the proximal and distal site of the anastomosis where the severe adhesion were and the tissue was so thick that I had to work it for at least 15 seconds so I can be able to close the stapler and then the mesentery of the small bowel was extremely thick, I had to use a LigaSure and even with the LigaSure there was some bleeding, which I had to control with either suture ligation or suction cautery and at this point, after doing the resection of the small bowel, it was sent for permanent pathology and then I decided to do a side-to-side anastomosis. Though I said there was severe adhesion, I was able to dissect slightly so I can have no tension on the anastomosis and this was done with a small opening on both sides and then a side-to-side anastomosis was performed using a JESSICA blue load and at this point, after doing side-to-side anastomosis, all the staple line were reinforced with #2-0 Vicryl sutures along  the stapler line, so we can make sure that there is no leak and because of the concern at the leak of anastomosis, I decided to put a large Arnulfo drain close to the anastomosis and again I tried to check the NG tube with anesthesia placed, but I was not able to check it because there was severe adhesion on the even upper abdomen. So I decided not to proceed with any more lysis of adhesions and at this point, I was able to identify the fascia and I decided to close the fascia with interrupted #1 PDS suture because I am concerned about evisceration because of her just recent laparotomy 8 days ago. This was done with an interrupted 0 PDS suture and approximation of the fascia. The skin was closed very deeply with a stapler because of the risk of wound infection.       Sandy Villarreal MD      [email or web address]  D:  08/10/2019 16:58  T:  08/10/2019 18:10  JOB #:  [id number]

## 2019-08-11 NOTE — PROGRESS NOTES
0725.Bedside and Verbal shift change report given to this nurse Jhon Gallardo (oncoming nurse) by [address] [facility] (offgoing nurse). Report included the following information SBAR, Kardex and MAR.       1925 Bedside and Verbal shift change report given to Anabella (oncoming nurse) by this nurse Jhon Gallardo (offgoing nurse). Report included the following information SBAR, Kardex and MAR.

## 2019-08-12 LAB
ABO + RH BLD: NORMAL
ANION GAP SERPL CALC-SCNC: 7 MMOL/L (ref 3–18)
BASOPHILS # BLD: 0 K/UL (ref 0–0.1)
BASOPHILS NFR BLD: 0 % (ref 0–2)
BLD PROD TYP BPU: NORMAL
BLD PROD TYP BPU: NORMAL
BLOOD GROUP ANTIBODIES SERPL: NORMAL
BPU ID: NORMAL
BPU ID: NORMAL
BUN SERPL-MCNC: 16 MG/DL (ref 7–18)
BUN/CREAT SERPL: 25 (ref 12–20)
CALCIUM SERPL-MCNC: 8 MG/DL (ref 8.5–10.1)
CALLED TO:,BCALL1: NORMAL
CHLORIDE SERPL-SCNC: 107 MMOL/L (ref 100–111)
CO2 SERPL-SCNC: 29 MMOL/L (ref 21–32)
CREAT SERPL-MCNC: 0.64 MG/DL (ref 0.6–1.3)
CROSSMATCH RESULT,%XM: NORMAL
CROSSMATCH RESULT,%XM: NORMAL
DIFFERENTIAL METHOD BLD: ABNORMAL
EOSINOPHIL # BLD: 0.2 K/UL (ref 0–0.4)
EOSINOPHIL NFR BLD: 2 % (ref 0–5)
ERYTHROCYTE [DISTWIDTH] IN BLOOD BY AUTOMATED COUNT: 16.2 % (ref 11.6–14.5)
GLUCOSE BLD STRIP.AUTO-MCNC: 102 MG/DL (ref 70–110)
GLUCOSE BLD STRIP.AUTO-MCNC: 105 MG/DL (ref 70–110)
GLUCOSE BLD STRIP.AUTO-MCNC: 114 MG/DL (ref 70–110)
GLUCOSE SERPL-MCNC: 115 MG/DL (ref 74–99)
HCT VFR BLD AUTO: 28.5 % (ref 35–45)
HGB BLD-MCNC: 9 G/DL (ref 12–16)
LYMPHOCYTES # BLD: 1.1 K/UL (ref 0.9–3.6)
LYMPHOCYTES NFR BLD: 9 % (ref 21–52)
MCH RBC QN AUTO: 27.4 PG (ref 24–34)
MCHC RBC AUTO-ENTMCNC: 31.6 G/DL (ref 31–37)
MCV RBC AUTO: 86.6 FL (ref 74–97)
MONOCYTES # BLD: 1.2 K/UL (ref 0.05–1.2)
MONOCYTES NFR BLD: 10 % (ref 3–10)
NEUTS SEG # BLD: 10.3 K/UL (ref 1.8–8)
NEUTS SEG NFR BLD: 79 % (ref 40–73)
PLATELET # BLD AUTO: 232 K/UL (ref 135–420)
PMV BLD AUTO: 11.1 FL (ref 9.2–11.8)
POTASSIUM SERPL-SCNC: 3.6 MMOL/L (ref 3.5–5.5)
RBC # BLD AUTO: 3.29 M/UL (ref 4.2–5.3)
SODIUM SERPL-SCNC: 143 MMOL/L (ref 136–145)
SPECIMEN EXP DATE BLD: NORMAL
STATUS OF UNIT,%ST: NORMAL
STATUS OF UNIT,%ST: NORMAL
UNIT DIVISION, %UDIV: 0
UNIT DIVISION, %UDIV: 0
WBC # BLD AUTO: 12.9 K/UL (ref 4.6–13.2)

## 2019-08-12 PROCEDURE — 77010033678 HC OXYGEN DAILY

## 2019-08-12 PROCEDURE — 65270000029 HC RM PRIVATE

## 2019-08-12 PROCEDURE — 74011250636 HC RX REV CODE- 250/636: Performed by: SURGERY

## 2019-08-12 PROCEDURE — 85025 COMPLETE CBC W/AUTO DIFF WBC: CPT

## 2019-08-12 PROCEDURE — 80048 BASIC METABOLIC PNL TOTAL CA: CPT

## 2019-08-12 PROCEDURE — 82962 GLUCOSE BLOOD TEST: CPT

## 2019-08-12 PROCEDURE — 36415 COLL VENOUS BLD VENIPUNCTURE: CPT

## 2019-08-12 RX ORDER — HYDROMORPHONE HYDROCHLORIDE 1 MG/ML
0.5 INJECTION, SOLUTION INTRAMUSCULAR; INTRAVENOUS; SUBCUTANEOUS
Status: DISCONTINUED | OUTPATIENT
Start: 2019-08-12 | End: 2019-08-17

## 2019-08-12 RX ADMIN — KETOROLAC TROMETHAMINE 30 MG: 30 INJECTION, SOLUTION INTRAMUSCULAR at 12:49

## 2019-08-12 RX ADMIN — SODIUM CHLORIDE, SODIUM LACTATE, POTASSIUM CHLORIDE, AND CALCIUM CHLORIDE 100 ML/HR: 600; 310; 30; 20 INJECTION, SOLUTION INTRAVENOUS at 23:07

## 2019-08-12 RX ADMIN — PHENOL 1 SPRAY: 1.5 LIQUID ORAL at 03:26

## 2019-08-12 RX ADMIN — DIPHENHYDRAMINE HYDROCHLORIDE 25 MG: 50 INJECTION, SOLUTION INTRAMUSCULAR; INTRAVENOUS at 09:05

## 2019-08-12 RX ADMIN — HYDROMORPHONE HYDROCHLORIDE 0.5 MG: 1 INJECTION, SOLUTION INTRAMUSCULAR; INTRAVENOUS; SUBCUTANEOUS at 12:49

## 2019-08-12 RX ADMIN — HYDROMORPHONE HYDROCHLORIDE 0.5 MG: 1 INJECTION, SOLUTION INTRAMUSCULAR; INTRAVENOUS; SUBCUTANEOUS at 17:37

## 2019-08-12 RX ADMIN — PHENOL 1 SPRAY: 1.5 LIQUID ORAL at 00:00

## 2019-08-12 RX ADMIN — HYDROMORPHONE HYDROCHLORIDE 0.5 MG: 1 INJECTION, SOLUTION INTRAMUSCULAR; INTRAVENOUS; SUBCUTANEOUS at 22:00

## 2019-08-12 RX ADMIN — KETOROLAC TROMETHAMINE 30 MG: 30 INJECTION, SOLUTION INTRAMUSCULAR at 06:04

## 2019-08-12 RX ADMIN — SODIUM CHLORIDE, SODIUM LACTATE, POTASSIUM CHLORIDE, AND CALCIUM CHLORIDE 100 ML/HR: 600; 310; 30; 20 INJECTION, SOLUTION INTRAVENOUS at 02:50

## 2019-08-12 RX ADMIN — KETOROLAC TROMETHAMINE 30 MG: 30 INJECTION, SOLUTION INTRAMUSCULAR at 00:08

## 2019-08-12 RX ADMIN — KETOROLAC TROMETHAMINE 30 MG: 30 INJECTION, SOLUTION INTRAMUSCULAR at 17:38

## 2019-08-12 RX ADMIN — SODIUM CHLORIDE, SODIUM LACTATE, POTASSIUM CHLORIDE, AND CALCIUM CHLORIDE 100 ML/HR: 600; 310; 30; 20 INJECTION, SOLUTION INTRAVENOUS at 13:50

## 2019-08-12 RX ADMIN — HYDROMORPHONE HYDROCHLORIDE 0.5 MG: 1 INJECTION, SOLUTION INTRAMUSCULAR; INTRAVENOUS; SUBCUTANEOUS at 03:23

## 2019-08-12 RX ADMIN — HYDROMORPHONE HYDROCHLORIDE 0.5 MG: 1 INJECTION, SOLUTION INTRAMUSCULAR; INTRAVENOUS; SUBCUTANEOUS at 09:06

## 2019-08-12 NOTE — CDMP QUERY
Pt admitted with SBO. Pt noted to have post procedural intestinal obstruction. If possible, please document in progress notes and d/c summary: ? Intestinal obstruction as a post-operative complication ? Intestinal obstruction as an expected/inherent condition that occurred post-operatively and not a complication ? Intestinal obstruction related to other underlying condition or incidental risk factor (please specify) occurring after surgery and not a complication ? Other, please specify ? Unable to determine The medical record reflects the following: 
               Risk Factors: 60 yo female s/p laparotomy with lysis of adhesions Clinical indicators:  CT with small bowel obstruction with a transition point and findings suggestive of internal hernia at the anastomosis site Treatment:  8/10 Exploratory lap with extensive lysis of adhesions and small bowel resection Thank you, 
Deja Raya RN CDI   [phone]

## 2019-08-12 NOTE — PROGRESS NOTES
Problem: Diabetes Self-Management  Goal: *Disease process and treatment process  Description  Define diabetes and identify own type of diabetes; list 3 options for treating diabetes. Outcome: Progressing Towards Goal  Goal: *Incorporating nutritional management into lifestyle  Description  Describe effect of type, amount and timing of food on blood glucose; list 3 methods for planning meals. Outcome: Progressing Towards Goal  Goal: *Incorporating physical activity into lifestyle  Description  State effect of exercise on blood glucose levels. Outcome: Progressing Towards Goal  Goal: *Developing strategies to promote health/change behavior  Description  Define the ABC's of diabetes; identify appropriate screenings, schedule and personal plan for screenings. Outcome: Progressing Towards Goal  Goal: *Using medications safely  Description  State effect of diabetes medications on diabetes; name diabetes medication taking, action and side effects. Outcome: Progressing Towards Goal  Goal: *Monitoring blood glucose, interpreting and using results  Description  Identify recommended blood glucose targets  and personal targets. Outcome: Progressing Towards Goal  Goal: *Prevention, detection, treatment of acute complications  Description  List symptoms of hyper- and hypoglycemia; describe how to treat low blood sugar and actions for lowering  high blood glucose level. Outcome: Progressing Towards Goal  Goal: *Prevention, detection and treatment of chronic complications  Description  Define the natural course of diabetes and describe the relationship of blood glucose levels to long term complications of diabetes.   Outcome: Progressing Towards Goal  Goal: *Developing strategies to address psychosocial issues  Description  Describe feelings about living with diabetes; identify support needed and support network  Outcome: Progressing Towards Goal  Goal: *Sick day guidelines  Outcome: Progressing Towards Goal  Goal: *Patient Specific Goal (EDIT GOAL, INSERT TEXT)  Outcome: Progressing Towards Goal     Problem: Patient Education: Go to Patient Education Activity  Goal: Patient/Family Education  Outcome: Progressing Towards Goal     Problem: Falls - Risk of  Goal: *Absence of Falls  Description  Document Madisyn Larson Fall Risk and appropriate interventions in the flowsheet. Outcome: Progressing Towards Goal  Note:   Fall Risk Interventions:  Mobility Interventions: Bed/chair exit alarm, Communicate number of staff needed for ambulation/transfer         Medication Interventions: Bed/chair exit alarm, Evaluate medications/consider consulting pharmacy    Elimination Interventions: Bed/chair exit alarm, Call light in reach, Patient to call for help with toileting needs, Toileting schedule/hourly rounds              Problem: Patient Education: Go to Patient Education Activity  Goal: Patient/Family Education  Outcome: Progressing Towards Goal     Problem: Discharge Planning  Goal: *Discharge to safe environment  Outcome: Progressing Towards Goal     Problem: Pressure Injury - Risk of  Goal: *Prevention of pressure injury  Description  Document Cristi Scale and appropriate interventions in the flowsheet. Outcome: Progressing Towards Goal  Note:   Pressure Injury Interventions:  Sensory Interventions: Assess changes in LOC, Assess need for specialty bed, Avoid rigorous massage over bony prominences, Check visual cues for pain, Float heels, Keep linens dry and wrinkle-free, Minimize linen layers, Monitor skin under medical devices, Pad between skin to skin, Pressure redistribution bed/mattress (bed type), Turn and reposition approx.  every two hours (pillows and wedges if needed)         Activity Interventions: Assess need for specialty bed, Increase time out of bed, Pressure redistribution bed/mattress(bed type)    Mobility Interventions: Assess need for specialty bed, Float heels, HOB 30 degrees or less, Pressure redistribution bed/mattress (bed type), Turn and reposition approx.  every two hours(pillow and wedges)    Nutrition Interventions: Document food/fluid/supplement intake    Friction and Shear Interventions: Foam dressings/transparent film/skin sealants, HOB 30 degrees or less, Lift sheet                Problem: Patient Education: Go to Patient Education Activity  Goal: Patient/Family Education  Outcome: Progressing Towards Goal     Problem: Pain  Goal: *Control of Pain  Outcome: Progressing Towards Goal     Problem: Patient Education: Go to Patient Education Activity  Goal: Patient/Family Education  Outcome: Progressing Towards Goal     Problem: Patient Education: Go to Patient Education Activity  Goal: Patient/Family Education  Outcome: Progressing Towards Goal     Problem: Surgical Pathway Post-Op Day 2 through Discharge  Goal: Off Pathway (Use only if patient is Off Pathway)  Outcome: Progressing Towards Goal  Goal: Activity/Safety  Outcome: Progressing Towards Goal  Goal: Nutrition/Diet  Outcome: Progressing Towards Goal  Goal: Discharge Planning  Outcome: Progressing Towards Goal  Goal: Medications  Outcome: Progressing Towards Goal  Goal: Respiratory  Outcome: Progressing Towards Goal  Goal: Treatments/Interventions/Procedures  Outcome: Progressing Towards Goal  Goal: Psychosocial  Outcome: Progressing Towards Goal  Goal: *No signs and symptoms of infection or wound complications  Outcome: Progressing Towards Goal  Goal: *Optimal pain control at patient's stated goal  Outcome: Progressing Towards Goal  Goal: *Adequate urinary output (equal to or greater than 30 milliliters/hour)  Outcome: Progressing Towards Goal  Goal: *Hemodynamically stable  Outcome: Progressing Towards Goal  Goal: *Tolerating diet  Outcome: Progressing Towards Goal  Goal: *Demonstrates progressive activity  Outcome: Progressing Towards Goal  Goal: *Lungs clear or at baseline  Outcome: Progressing Towards Goal     Problem: Surgical Pathway: Discharge Outcomes  Goal: *Hemodynamically stable  Outcome: Progressing Towards Goal  Goal: *Lungs clear or at baseline  Outcome: Progressing Towards Goal  Goal: *Demonstrates independent activity or return to baseline  Outcome: Progressing Towards Goal  Goal: *Optimal pain control at patient's stated goal  Outcome: Progressing Towards Goal  Goal: *Verbalizes understanding and describes prescribed diet  Outcome: Progressing Towards Goal  Goal: *Tolerating diet  Outcome: Progressing Towards Goal  Goal: *Verbalizes name, dosage, time, side effects, and number of days to continue medications  Outcome: Progressing Towards Goal  Goal: *No signs and symptoms of infection or wound complications  Outcome: Progressing Towards Goal  Goal: *Anxiety reduced or absent  Outcome: Progressing Towards Goal  Goal: *Understands and describes signs and symptoms to report to providers(Stroke Metric)  Outcome: Progressing Towards Goal  Goal: *Describes follow-up/return visits to physicians  Outcome: Progressing Towards Goal  Goal: *Describes available resources and support systems  Outcome: Progressing Towards Goal

## 2019-08-12 NOTE — PROGRESS NOTES
conducted an initial consultation and Spiritual Assessment for William Smith, who is a 61 y.o.,female. Patients Primary Language is: [place]. According to the patients EMR Hoahaoism Affiliation is: [place].     The reason the Patient came to the hospital is:   Patient Active Problem List    Diagnosis Date Noted    Anemia 08/10/2019    S/P small bowel resection 08/01/2019    Adnexal cyst 07/30/2019    Chronic pelvic pain in female 07/30/2019    Bowel obstruction ([address] 08/23/2018    Non-intractable cyclical vomiting with nausea 01/24/2018    Type 2 diabetes mellitus with nephropathy ([address] 01/19/2018    Chronic abdominal pain 03/14/2017    Post-operative pain 03/14/2017    SBO (small bowel obstruction) ([address] 02/21/2017    Osteoarthritis of left knee 06/27/2016    Hypertension 06/27/2016    Hyperlipidemia 06/27/2016    GERD (gastroesophageal reflux disease) 06/27/2016    Asthma 06/27/2016    Type 2 diabetes mellitus ([facility] 06/27/2016    Sural neuritis 06/23/2014    Chest pain 04/20/2014    Essential hypertension, benign 02/27/2012    Generalized abdominal pain         The  provided the following Interventions:   attempted to Initiate a a relationship of care and support with patient in room 2206 this morning but found her resting peacefully at this time and therefore not able to communicate at present. Provided information about Spiritual Care Services. Offered prayer and assurance of continued prayers on patients behalf. Assessment:  Patient does not have any Anabaptist/cultural needs that will affect patients preferences in health care. There are no further spiritual or Anabaptist issues which require Spiritual Care Services interventions at this time. Plan:  Chaplains will continue to follow and will provide pastoral care on an as needed/requested basis    . Franchesca Velasco   Spiritual Care   [phone]

## 2019-08-12 NOTE — PROGRESS NOTES
Patient seen and examined. She is doing relatively well. She is stating that her abdominal pain is getting better. Her NG tube is in place and is draining bilious content. Whitman catheter in place and she is making good urine. She was transfused 2 units of blood on Saturday. She has no fever and her tachycardia has improved. Her abdomen is soft with incisional tenderness. I removed the dressing.  The wound is clean  Plan:  DC Whitman catheter  Ambulate  Keep n.p.o. with NG tube  Repeat labs tomorrow  DVT prophylaxis

## 2019-08-12 NOTE — PROGRESS NOTES
Patient received in bed awake. Patient A&O x4, denies pain. No distress noted. Frequently use items within reach. Bed locked in low position. Call bell within reach and Patient verbalized understanding of use for assistance and needs. 0900- Patient c/o itching; see MAR for prn Benadryl to be given. 0930- Patient resting. No further c/o having itching. Call bell w/in reach. 1053- Pt made aware of order to DC briones catheter, voiced understanding. Briones dc 'd emptied 100 ml of maryjane urine; no sediments noted. Pt tolerated well. Made aware of need of urinating w/in 4 to 6 hrs, voiced understanding. 1730- Patient's MEW's 3 d/t . Patient c/o abd pain; see MAR for prn Dilaudid to be given. 18- Dr. Anibal Khalil to floor made aware of Patient having sinus tach; voiced understanding. Also gave V.O. to change Dilaudid from 0.5 mg every 1 hour IV prn to Dilaudid 0.5 mg IV every 2 hours prn pain (RBV).

## 2019-08-12 NOTE — PROGRESS NOTES
Physical Exam   Skin:                                                                                                                           Primary Nurse Kishor Fitzgerald, KAMI and Deepti Hansen, , RN performed a dual skin assessment on this patient No impairment noted.

## 2019-08-12 NOTE — PROGRESS NOTES
Nutrition initial assessment/Plan of care      RECOMMENDATIONS:   1. NPO: diet to be advanced when medically indicated and per Pt tolerance  2. Monitor labs, weight and PO intake  3. RD to follow     GOALS:   1. PO intake meets >75% of protein/calorie needs by 8/17  2. Weight Maintenance (+/- 1-2 lb) by 8/19     ASSESSMENT:   Wt status classified as over weight per Body mass index is 26.43 kg/m². However, per documented weight records Pt w/ a 6 lb or 3.8% loss x 1 month PTA. Currently NPO. Labs noted. BG range (102-168) over the past 24 hours; A1c (6.6%). Nutrition recommendations listed. RD to follow. Nutrition Diagnoses: Altered GI function related to SBO as evidenced by need for recent surgery and NPO with NGT to suction. Nutrition Risk:  [] High  [x] Moderate []  Low    SUBJECTIVE/OBJECTIVE:    Pt admitted for SBO s/p small bowel resection during recent admission. GS following: Pt s/p LAPAROTOMY EXPLORATORY. Extensive lysis of adhesions. Small bowel resection. Drain placement on (8/10). Plan is to continue NPO w/ NGT to suction and IVF. Noted per documented weight records Pt w/ a 6 lb or 3.8% loss x 1 month PTA. Pt seen in room with NGT in place. NKFA or problems chewing/swallowing. Pt stated her  lb and stable PTA. Will continue to monitor diet advancement and follow up with diet education for low fiber. Information Obtained from:    [x] Chart Review   [x] Patient   [] Family/Caregiver   [] Nurse/Physician   [] Interdisciplinary Meeting/Rounds      Diet: NPO  Medications: [x] Reviewed   IVF: LR @100 mL/hr   Allergies: [x] Reviewed   Encounter Diagnoses     ICD-10-CM ICD-9-CM   1.  SBO (small bowel obstruction) ([address] K56.609 560.9     Past Medical History:   Diagnosis Date    Abdominal adhesions     Adnexal cyst 7/30/2019    Left    Anemia     Asthma     Chronic pelvic pain in female 7/30/2019    Diabetes mellitus     Dyskinesia     bilateral    Essential hypertension     GERD (gastroesophageal reflux disease)     Hypertension     Menopause     Microhematuria     Rheumatoid arteritis 2018    Stool color black       Labs:    Lab Results   Component Value Date/Time    Sodium 143 08/12/2019 01:00 AM    Potassium 3.6 08/12/2019 01:00 AM    Chloride 107 08/12/2019 01:00 AM    CO2 29 08/12/2019 01:00 AM    Anion gap 7 08/12/2019 01:00 AM    Glucose 115 (H) 08/12/2019 01:00 AM    BUN 16 08/12/2019 01:00 AM    Creatinine 0.64 08/12/2019 01:00 AM    Calcium 8.0 (L) 08/12/2019 01:00 AM    Magnesium 1.9 08/04/2019 10:10 AM    Phosphorus 2.0 (L) 08/04/2019 10:10 AM    Albumin 2.4 (L) 08/10/2019 01:34 PM     Anthropometrics: BMI (calculated): 26.4  Last 3 Recorded Weights in this Encounter    08/10/19 1035   Weight: 69.9 kg (154 lb)      Ht Readings from Last 1 Encounters:   08/10/19 5' 4\" (1.626 m)     Weight Metrics 8/10/2019 8/1/2019 7/30/2019 7/24/2019 7/24/2019 7/22/2019 7/17/2019   Weight 154 lb 154 lb 9.6 oz 156 lb 157 lb 157 lb 156 lb 6.4 oz 160 lb   BMI 26.43 kg/m2 26.54 kg/m2 26.78 kg/m2 26.95 kg/m2 26.95 kg/m2 26.85 kg/m2 27.46 kg/m2       No data found. IBW: 120 lb %IBW: 128% UBW: 160-165 lb   [x] Weight Loss PTA [] Weight Gain [] Weight Stable    Estimated Nutrition Needs: [x] MSJ x 1.2-1.3   Calories: 3221-6488 kcal Based on:   [x] Actual BW    Protein:   70-84 g Based on:   [x] Actual BW    Fluid:       1811-1653 ml Based on:   [x] Actual BW      [x] No Cultural, Synagogue or ethnic dietary need identified.     [] Cultural, Synagogue and ethnic food preferences identified and addressed     Wt Status:  [] Normal (18.6 - 24.9) [] Underweight (<18.5) [x] Overweight (25 - 29.9) [] Mild Obesity (30 - 34.9)  [] Moderate Obesity (35 - 39.9) [] Morbid Obesity (40+)       Nutrition Problems Identified:   [x] Suboptimal PO intake v/s NPO  [] Food Allergies  [] Difficulty chewing/swallowing/poor dentition  [] Constipation/Diarrhea   [] Nausea/Vomiting   [] None  [] Other:     Plan:   [] Therapeutic Diet  []  Obtained/adjusted food preferences/tolerances and/or snacks options   []  Supplements added   [] Occupational therapy following for feeding techniques  []  HS snack added   []  Modify diet texture   []  Modify diet for food allergies   []  Educate patient   []  Assist with menu selection   []  Monitor PO intake on meal rounds   [x]  Continue inpatient monitoring and intervention   [x]  Participated in discharge planning/Interdisciplinary rounds/Team meetings   []  Other:     Education Needs:   [x] Not appropriate for teaching at this time    [] Identified and addressed    Nutrition Monitoring and Evaluation:  [x] Continue ongoing monitoring and intervention  [] Other    Morris Pock

## 2019-08-12 NOTE — PROGRESS NOTES
2000--received patient resting quietly watching tv and talking on phone. VSS. Clean and dry. Assessment completed. When nurse entered room patient asked for pain medication. Explained that the off going nurse just gave her pain medication and patient stated ok then asked if it was time for benadryl. Explained not due until 2300. Patient then asked what about Toradol. Explained not due until 0000. Patient states ok. No distress noted. 2221--patient c/o abdominal pain after coughing 4/10 and sharp. Dilaudid 0.5mg iv given. No distress noted. 2324-Resting queitly in good spirits talking and laughing. Watching tv. VSS. Clean and dry,. C/o itching. Benadryl 25mg ivp given. No distress noted. Assessment unchanged. 0001--Resting quietly in good spirits without complaint. No distress noted. Toothettes provided for mouth care and gown adjusted and tied per patient request. 0323--Dilaudid 0.5mg ivp given for c/o moderate abdominal pain. Also used one spray of choraseptic spray to throat. Briones care done,. 0428--Assessment unchanged. VSS. Clean and dry. No distress noted. 0606--Resting quietly in good spirits watching tv. Clean and dry. NGT cannister liner changed. NGT, BOBBY , IV's and briones patent and intact. No distress noted. 0715--Bedside shift change report given to Fer Calderon RN (oncoming nurse) by Zainab Dozier Rn (offgoing nurse). Report included the following information SBAR, OR Summary, Procedure Summary, Intake/Output and Recent Results.

## 2019-08-13 LAB
ANION GAP SERPL CALC-SCNC: 10 MMOL/L (ref 3–18)
BASOPHILS # BLD: 0 K/UL (ref 0–0.1)
BASOPHILS NFR BLD: 0 % (ref 0–2)
BUN SERPL-MCNC: 16 MG/DL (ref 7–18)
BUN/CREAT SERPL: 24 (ref 12–20)
CALCIUM SERPL-MCNC: 8.2 MG/DL (ref 8.5–10.1)
CHLORIDE SERPL-SCNC: 105 MMOL/L (ref 100–111)
CO2 SERPL-SCNC: 29 MMOL/L (ref 21–32)
CREAT SERPL-MCNC: 0.68 MG/DL (ref 0.6–1.3)
DIFFERENTIAL METHOD BLD: ABNORMAL
EOSINOPHIL # BLD: 0.1 K/UL (ref 0–0.4)
EOSINOPHIL NFR BLD: 1 % (ref 0–5)
ERYTHROCYTE [DISTWIDTH] IN BLOOD BY AUTOMATED COUNT: 16.5 % (ref 11.6–14.5)
GLUCOSE BLD STRIP.AUTO-MCNC: 107 MG/DL (ref 70–110)
GLUCOSE BLD STRIP.AUTO-MCNC: 108 MG/DL (ref 70–110)
GLUCOSE BLD STRIP.AUTO-MCNC: 113 MG/DL (ref 70–110)
GLUCOSE BLD STRIP.AUTO-MCNC: 124 MG/DL (ref 70–110)
GLUCOSE SERPL-MCNC: 113 MG/DL (ref 74–99)
HCT VFR BLD AUTO: 24.9 % (ref 35–45)
HGB BLD-MCNC: 7.8 G/DL (ref 12–16)
LYMPHOCYTES # BLD: 0.9 K/UL (ref 0.9–3.6)
LYMPHOCYTES NFR BLD: 9 % (ref 21–52)
MCH RBC QN AUTO: 27.5 PG (ref 24–34)
MCHC RBC AUTO-ENTMCNC: 31.3 G/DL (ref 31–37)
MCV RBC AUTO: 87.7 FL (ref 74–97)
MONOCYTES # BLD: 1.2 K/UL (ref 0.05–1.2)
MONOCYTES NFR BLD: 11 % (ref 3–10)
NEUTS SEG # BLD: 8.3 K/UL (ref 1.8–8)
NEUTS SEG NFR BLD: 79 % (ref 40–73)
PLATELET # BLD AUTO: 459 K/UL (ref 135–420)
PMV BLD AUTO: 9.6 FL (ref 9.2–11.8)
POTASSIUM SERPL-SCNC: 3.5 MMOL/L (ref 3.5–5.5)
RBC # BLD AUTO: 2.84 M/UL (ref 4.2–5.3)
SODIUM SERPL-SCNC: 144 MMOL/L (ref 136–145)
WBC # BLD AUTO: 10.4 K/UL (ref 4.6–13.2)

## 2019-08-13 PROCEDURE — 77030018786 HC NDL GD F/USND BARD -B

## 2019-08-13 PROCEDURE — 74011250636 HC RX REV CODE- 250/636: Performed by: SURGERY

## 2019-08-13 PROCEDURE — C1751 CATH, INF, PER/CENT/MIDLINE: HCPCS

## 2019-08-13 PROCEDURE — 65270000029 HC RM PRIVATE

## 2019-08-13 PROCEDURE — 80048 BASIC METABOLIC PNL TOTAL CA: CPT

## 2019-08-13 PROCEDURE — 36573 INSJ PICC RS&I 5 YR+: CPT | Performed by: SURGERY

## 2019-08-13 PROCEDURE — 77010033678 HC OXYGEN DAILY

## 2019-08-13 PROCEDURE — 85025 COMPLETE CBC W/AUTO DIFF WBC: CPT

## 2019-08-13 PROCEDURE — 74011000250 HC RX REV CODE- 250: Performed by: SURGERY

## 2019-08-13 PROCEDURE — 77030008771 HC TU NG SALEM SUMP -A

## 2019-08-13 PROCEDURE — 36415 COLL VENOUS BLD VENIPUNCTURE: CPT

## 2019-08-13 PROCEDURE — 74011000258 HC RX REV CODE- 258: Performed by: SURGERY

## 2019-08-13 PROCEDURE — 3E0436Z INTRODUCTION OF NUTRITIONAL SUBSTANCE INTO CENTRAL VEIN, PERCUTANEOUS APPROACH: ICD-10-PCS | Performed by: SURGERY

## 2019-08-13 PROCEDURE — 02HV33Z INSERTION OF INFUSION DEVICE INTO SUPERIOR VENA CAVA, PERCUTANEOUS APPROACH: ICD-10-PCS | Performed by: SURGERY

## 2019-08-13 RX ORDER — SODIUM CHLORIDE 0.9 % (FLUSH) 0.9 %
20 SYRINGE (ML) INJECTION EVERY 24 HOURS
Status: DISCONTINUED | OUTPATIENT
Start: 2019-08-13 | End: 2019-08-16

## 2019-08-13 RX ORDER — SODIUM CHLORIDE 0.9 % (FLUSH) 0.9 %
10 SYRINGE (ML) INJECTION AS NEEDED
Status: DISCONTINUED | OUTPATIENT
Start: 2019-08-13 | End: 2019-08-20

## 2019-08-13 RX ORDER — SODIUM CHLORIDE 0.9 % (FLUSH) 0.9 %
10-30 SYRINGE (ML) INJECTION AS NEEDED
Status: DISCONTINUED | OUTPATIENT
Start: 2019-08-13 | End: 2019-08-20

## 2019-08-13 RX ORDER — SODIUM CHLORIDE 0.9 % (FLUSH) 0.9 %
10-40 SYRINGE (ML) INJECTION EVERY 8 HOURS
Status: DISCONTINUED | OUTPATIENT
Start: 2019-08-13 | End: 2019-08-16

## 2019-08-13 RX ORDER — SODIUM CHLORIDE 0.9 % (FLUSH) 0.9 %
10 SYRINGE (ML) INJECTION EVERY 24 HOURS
Status: DISCONTINUED | OUTPATIENT
Start: 2019-08-13 | End: 2019-08-16

## 2019-08-13 RX ORDER — BACITRACIN 500 UNIT/G
1 PACKET (EA) TOPICAL AS NEEDED
Status: DISCONTINUED | OUTPATIENT
Start: 2019-08-13 | End: 2019-08-20

## 2019-08-13 RX ADMIN — KETOROLAC TROMETHAMINE 30 MG: 30 INJECTION, SOLUTION INTRAMUSCULAR at 05:58

## 2019-08-13 RX ADMIN — SODIUM CHLORIDE, SODIUM LACTATE, POTASSIUM CHLORIDE, AND CALCIUM CHLORIDE 100 ML/HR: 600; 310; 30; 20 INJECTION, SOLUTION INTRAVENOUS at 20:31

## 2019-08-13 RX ADMIN — Medication 10 ML: at 12:00

## 2019-08-13 RX ADMIN — KETOROLAC TROMETHAMINE 30 MG: 30 INJECTION, SOLUTION INTRAMUSCULAR at 18:01

## 2019-08-13 RX ADMIN — PIPERACILLIN SODIUM,TAZOBACTAM SODIUM 3.38 G: 3; .375 INJECTION, POWDER, FOR SOLUTION INTRAVENOUS at 22:33

## 2019-08-13 RX ADMIN — HYDROMORPHONE HYDROCHLORIDE 0.5 MG: 1 INJECTION, SOLUTION INTRAMUSCULAR; INTRAVENOUS; SUBCUTANEOUS at 13:46

## 2019-08-13 RX ADMIN — PIPERACILLIN SODIUM,TAZOBACTAM SODIUM 3.38 G: 3; .375 INJECTION, POWDER, FOR SOLUTION INTRAVENOUS at 08:25

## 2019-08-13 RX ADMIN — DIPHENHYDRAMINE HYDROCHLORIDE 25 MG: 50 INJECTION, SOLUTION INTRAMUSCULAR; INTRAVENOUS at 15:36

## 2019-08-13 RX ADMIN — HYDROMORPHONE HYDROCHLORIDE 0.5 MG: 1 INJECTION, SOLUTION INTRAMUSCULAR; INTRAVENOUS; SUBCUTANEOUS at 16:42

## 2019-08-13 RX ADMIN — Medication 10 ML: at 22:35

## 2019-08-13 RX ADMIN — HYDROMORPHONE HYDROCHLORIDE 0.5 MG: 1 INJECTION, SOLUTION INTRAMUSCULAR; INTRAVENOUS; SUBCUTANEOUS at 20:31

## 2019-08-13 RX ADMIN — Medication 10 ML: at 14:00

## 2019-08-13 RX ADMIN — SODIUM CHLORIDE, SODIUM LACTATE, POTASSIUM CHLORIDE, AND CALCIUM CHLORIDE 100 ML/HR: 600; 310; 30; 20 INJECTION, SOLUTION INTRAVENOUS at 10:17

## 2019-08-13 RX ADMIN — CALCIUM GLUCONATE: 94 INJECTION, SOLUTION INTRAVENOUS at 20:23

## 2019-08-13 RX ADMIN — ONDANSETRON 6 MG: 2 INJECTION INTRAMUSCULAR; INTRAVENOUS at 09:13

## 2019-08-13 RX ADMIN — PIPERACILLIN SODIUM,TAZOBACTAM SODIUM 3.38 G: 3; .375 INJECTION, POWDER, FOR SOLUTION INTRAVENOUS at 13:45

## 2019-08-13 RX ADMIN — KETOROLAC TROMETHAMINE 30 MG: 30 INJECTION, SOLUTION INTRAMUSCULAR at 12:10

## 2019-08-13 RX ADMIN — Medication 20 ML: at 12:00

## 2019-08-13 RX ADMIN — HYDROMORPHONE HYDROCHLORIDE 0.5 MG: 1 INJECTION, SOLUTION INTRAMUSCULAR; INTRAVENOUS; SUBCUTANEOUS at 04:56

## 2019-08-13 RX ADMIN — DIPHENHYDRAMINE HYDROCHLORIDE 25 MG: 50 INJECTION, SOLUTION INTRAMUSCULAR; INTRAVENOUS at 22:58

## 2019-08-13 RX ADMIN — HYDROMORPHONE HYDROCHLORIDE 0.5 MG: 1 INJECTION, SOLUTION INTRAMUSCULAR; INTRAVENOUS; SUBCUTANEOUS at 10:32

## 2019-08-13 RX ADMIN — DIPHENHYDRAMINE HYDROCHLORIDE 25 MG: 50 INJECTION, SOLUTION INTRAMUSCULAR; INTRAVENOUS at 05:15

## 2019-08-13 RX ADMIN — HYDROMORPHONE HYDROCHLORIDE 0.5 MG: 1 INJECTION, SOLUTION INTRAMUSCULAR; INTRAVENOUS; SUBCUTANEOUS at 08:25

## 2019-08-13 RX ADMIN — KETOROLAC TROMETHAMINE 30 MG: 30 INJECTION, SOLUTION INTRAMUSCULAR at 00:25

## 2019-08-13 RX ADMIN — HYDROMORPHONE HYDROCHLORIDE 0.5 MG: 1 INJECTION, SOLUTION INTRAMUSCULAR; INTRAVENOUS; SUBCUTANEOUS at 02:40

## 2019-08-13 NOTE — ROUTINE PROCESS
Patient complaint of burning at IV site. Infusing well. No signs or symptoms of infiltration. Attempted new IV site in right hand. Unsuccessful. Patient tolerated well. Site infiltrated after flush. Current IV site flushed and patent. Continues fluids as ordered. Dilaudid administered as needed.  
 
Yakov Naqvi, RN, BSN

## 2019-08-13 NOTE — ANESTHESIA POSTPROCEDURE EVALUATION
Procedure(s):  LAPAROTOMY EXPLORATORY. Extensive lysis of adhesions. Small bowel resection. Drain placement. .    No value filed.     Anesthesia Post Evaluation      Multimodal analgesia: multimodal analgesia not used between 6 hours prior to anesthesia start to PACU discharge  Patient location during evaluation: PACU  Patient participation: complete - patient participated  Level of consciousness: awake and alert  Pain score: 3  Pain management: adequate  Airway patency: patent  Anesthetic complications: no  Cardiovascular status: acceptable  Respiratory status: acceptable  Hydration status: acceptable  Post anesthesia nausea and vomiting:  none      Vitals Value Taken Time   /62 8/10/2019  6:00 PM   Temp 36.6 °C (97.9 °F) 8/10/2019  6:00 PM   Pulse 107 8/10/2019  6:10 PM   Resp 27 8/10/2019  6:10 PM   SpO2 95 % 8/10/2019  6:10 PM

## 2019-08-13 NOTE — ROUTINE PROCESS
Bedside and Verbal shift change report given to KAMI Gandhi (oncoming nurse) by Yakov Naqvi RN, BSN 
 (offgoing nurse). Report included the following information SBAR, Kardex, Procedure Summary, Intake/Output, MAR and Recent Results.   
 
Yakov Naqvi RN, BSN

## 2019-08-13 NOTE — ROUTINE PROCESS
Bedside and Verbal shift change report given to Peg Chen RN (oncoming nurse) by Ashu Quintana RN, BSN (offgoing nurse). Report given with SBAR, Kardex, Intake/Output, MAR and Recent Results.

## 2019-08-13 NOTE — PROGRESS NOTES
Problem: Diabetes Self-Management  Goal: *Disease process and treatment process  Description  Define diabetes and identify own type of diabetes; list 3 options for treating diabetes. Outcome: Progressing Towards Goal  Goal: *Incorporating nutritional management into lifestyle  Description  Describe effect of type, amount and timing of food on blood glucose; list 3 methods for planning meals. Outcome: Progressing Towards Goal  Goal: *Incorporating physical activity into lifestyle  Description  State effect of exercise on blood glucose levels. Outcome: Progressing Towards Goal  Goal: *Developing strategies to promote health/change behavior  Description  Define the ABC's of diabetes; identify appropriate screenings, schedule and personal plan for screenings. Outcome: Progressing Towards Goal  Goal: *Using medications safely  Description  State effect of diabetes medications on diabetes; name diabetes medication taking, action and side effects. Outcome: Progressing Towards Goal  Goal: *Monitoring blood glucose, interpreting and using results  Description  Identify recommended blood glucose targets  and personal targets. Outcome: Progressing Towards Goal  Goal: *Prevention, detection, treatment of acute complications  Description  List symptoms of hyper- and hypoglycemia; describe how to treat low blood sugar and actions for lowering  high blood glucose level. Outcome: Progressing Towards Goal  Goal: *Prevention, detection and treatment of chronic complications  Description  Define the natural course of diabetes and describe the relationship of blood glucose levels to long term complications of diabetes.   Outcome: Progressing Towards Goal  Goal: *Developing strategies to address psychosocial issues  Description  Describe feelings about living with diabetes; identify support needed and support network  Outcome: Progressing Towards Goal  Goal: *Sick day guidelines  Outcome: Progressing Towards Goal  Goal: *Patient Specific Goal (EDIT GOAL, INSERT TEXT)  Outcome: Progressing Towards Goal     Problem: Patient Education: Go to Patient Education Activity  Goal: Patient/Family Education  Outcome: Progressing Towards Goal     Problem: Falls - Risk of  Goal: *Absence of Falls  Description  Document Juliaemmett Dominicbernice Fall Risk and appropriate interventions in the flowsheet. Outcome: Progressing Towards Goal  Note:   Fall Risk Interventions:  Mobility Interventions: Patient to call before getting OOB         Medication Interventions: Patient to call before getting OOB, Teach patient to arise slowly    Elimination Interventions: Call light in reach, Patient to call for help with toileting needs, Toileting schedule/hourly rounds              Problem: Patient Education: Go to Patient Education Activity  Goal: Patient/Family Education  Outcome: Progressing Towards Goal     Problem: Discharge Planning  Goal: *Discharge to safe environment  Outcome: Progressing Towards Goal     Problem: Pressure Injury - Risk of  Goal: *Prevention of pressure injury  Description  Document Cristi Scale and appropriate interventions in the flowsheet.   Outcome: Progressing Towards Goal  Note:   Pressure Injury Interventions:  Sensory Interventions: Assess changes in LOC, Keep linens dry and wrinkle-free, Maintain/enhance activity level         Activity Interventions: Increase time out of bed, Pressure redistribution bed/mattress(bed type)    Mobility Interventions: HOB 30 degrees or less, Pressure redistribution bed/mattress (bed type)    Nutrition Interventions: Document food/fluid/supplement intake, Offer support with meals,snacks and hydration    Friction and Shear Interventions: HOB 30 degrees or less                Problem: Patient Education: Go to Patient Education Activity  Goal: Patient/Family Education  Outcome: Progressing Towards Goal     Problem: Pain  Goal: *Control of Pain  Outcome: Progressing Towards Goal     Problem: Patient Education: Go to Patient Education Activity  Goal: Patient/Family Education  Outcome: Progressing Towards Goal     Problem: Patient Education: Go to Patient Education Activity  Goal: Patient/Family Education  Outcome: Progressing Towards Goal     Problem: Surgical Pathway Post-Op Day 2 through Discharge  Goal: Off Pathway (Use only if patient is Off Pathway)  Outcome: Progressing Towards Goal  Goal: Activity/Safety  Outcome: Progressing Towards Goal  Goal: Nutrition/Diet  Outcome: Progressing Towards Goal  Goal: Discharge Planning  Outcome: Progressing Towards Goal  Goal: Medications  Outcome: Progressing Towards Goal  Goal: Respiratory  Outcome: Progressing Towards Goal  Goal: Treatments/Interventions/Procedures  Outcome: Progressing Towards Goal  Goal: Psychosocial  Outcome: Progressing Towards Goal  Goal: *No signs and symptoms of infection or wound complications  Outcome: Progressing Towards Goal  Goal: *Optimal pain control at patient's stated goal  Outcome: Progressing Towards Goal  Goal: *Adequate urinary output (equal to or greater than 30 milliliters/hour)  Outcome: Progressing Towards Goal  Goal: *Hemodynamically stable  Outcome: Progressing Towards Goal  Goal: *Tolerating diet  Outcome: Progressing Towards Goal  Goal: *Demonstrates progressive activity  Outcome: Progressing Towards Goal  Goal: *Lungs clear or at baseline  Outcome: Progressing Towards Goal     Problem: Surgical Pathway: Discharge Outcomes  Goal: *Hemodynamically stable  Outcome: Progressing Towards Goal  Goal: *Lungs clear or at baseline  Outcome: Progressing Towards Goal  Goal: *Demonstrates independent activity or return to baseline  Outcome: Progressing Towards Goal  Goal: *Optimal pain control at patient's stated goal  Outcome: Progressing Towards Goal  Goal: *Verbalizes understanding and describes prescribed diet  Outcome: Progressing Towards Goal  Goal: *Tolerating diet  Outcome: Progressing Towards Goal  Goal: *Verbalizes name, dosage, time, side effects, and number of days to continue medications  Outcome: Progressing Towards Goal  Goal: *No signs and symptoms of infection or wound complications  Outcome: Progressing Towards Goal  Goal: *Anxiety reduced or absent  Outcome: Progressing Towards Goal  Goal: *Understands and describes signs and symptoms to report to providers(Stroke Metric)  Outcome: Progressing Towards Goal  Goal: *Describes follow-up/return visits to physicians  Outcome: Progressing Towards Goal  Goal: *Describes available resources and support systems  Outcome: Progressing Towards Goal     Problem: Nutrition Deficit  Goal: *Optimize nutritional status  Outcome: Progressing Towards Goal

## 2019-08-13 NOTE — ROUTINE PROCESS
Right 5 FR dual lumen Solo Power PICC inserted in RUE Brachial vein utilizing 3CG for SVC tip verification. Released for use, RN Oksana notified. 2 ml 1% lidocaine injected SC at insertion site for local anesthetic. Left basilic attempted first but vein curve prevented wire from passing upper arm.

## 2019-08-13 NOTE — PROGRESS NOTES
Patient seen and examined. She is doing well. Her abdominal pain is better. No fever. Her tachycardia is better. Her WBC is 10 K. Her abdomen is soft and non-tender and non-distended. Unfortunately her BOBBY drain contains some succus material. So I think she has a fistula. Plan:  PICC line and start TPN  NPO  Keep NG tube for now. Await return of bowel function  Monitor the amount of BOBBY drain to better assess the fistula. Currently no evidence of collection. Ambulation  Will start IV antibiotics for now just because of the recent diagnosis of this fistulae. It is well controlled.

## 2019-08-13 NOTE — PROGRESS NOTES
0725.Bedside and Verbal shift change report given to this nurse Slick Sosa (oncoming nurse) by Corrine Florence (offgoing nurse). Report included the following information SBAR, Kardex and MAR.     1935. Bedside and Verbal shift change report given to Corrine Florence (oncoming nurse) by this nurse Slick Sosa (offgoing nurse). Report included the following information SBAR, Kardex and MAR.

## 2019-08-13 NOTE — DIABETES MGMT
NUTRITIONAL RE-ASSESSMENT AND GLYCEMIC CONTROL PLAN OF CARE     Sofia Damian           61 y.o.           8/10/2019                 1. SBO (small bowel obstruction) ([facility])      ASSESSMENT:   Nutrition dx:  Pt with inadequate oral energy and protein intake r/t  NPO status ,concern for fistula ,inability to use enteral route, requiring parenteral nutrition. Nutritional assessment completed by Davdi Holden RD 8/12/19. INTERVENTIONS/PLAN:   For first TPN recommend: 85 g amino acids/d 250 g dextrose/d with 250ml 20%  Intralipid at 100ml/hour. TPN will provide 85 g protein, 1350 non-protein calories/d for initial order. SUBJECTIVE/OBJECTIVE:   Diet: npo  No data found. Medications: [x]                Reviewed     Most Recent POC Glucose:   Recent Labs     08/13/19  0355 08/12/19  0100 08/11/19  0030 08/10/19  1334   * 115* 152* 151*         Labs:   Lab Results   Component Value Date/Time    Hemoglobin A1c 6.6 (H) 08/10/2019 11:40 AM     Lab Results   Component Value Date/Time    Sodium 144 08/13/2019 03:55 AM    Potassium 3.5 08/13/2019 03:55 AM    Chloride 105 08/13/2019 03:55 AM    CO2 29 08/13/2019 03:55 AM    Anion gap 10 08/13/2019 03:55 AM    Glucose 113 (H) 08/13/2019 03:55 AM    BUN 16 08/13/2019 03:55 AM    Creatinine 0.68 08/13/2019 03:55 AM    Calcium 8.2 (L) 08/13/2019 03:55 AM    Magnesium 1.9 08/04/2019 10:10 AM    Phosphorus 2.0 (L) 08/04/2019 10:10 AM    Albumin 2.4 (L) 08/10/2019 01:34 PM       Anthropometrics:  ,  , BMI (calculated): 26.4  Wt Readings from Last 1 Encounters:   08/10/19 69.9 kg (154 lb)      Ht Readings from Last 1 Encounters:   08/10/19 5' 4\" (1.626 m)     Estimated Nutrition Needs: 9027 Kcals/day, Protein (g): 84 g Fluid (ml): 2400 ml  Based on:   [x]          Actual BW    []          IBW   []            Adjusted BW      Nutrition Diagnoses: as stated above     Nutrition Interventions: TPN  Goal:     Wt maintenance by 8/23.    Intake will meet >75% of energy and protein requirements by 8/18. Glucose will be within target range of 70-180mg/dl by  8/16.     Nutrition Monitoring and Evaluation      []     Monitor po intake on meal rounds  [x]     Continue inpatient monitoring and intervention  []     Other:      Nutrition Risk:  [x]   High     []  Moderate    []  Minimal/Uncompromised    Nannette Rodriguez RD  pgr [phone]

## 2019-08-14 LAB
ANION GAP SERPL CALC-SCNC: 8 MMOL/L (ref 3–18)
BUN SERPL-MCNC: 21 MG/DL (ref 7–18)
BUN/CREAT SERPL: 25 (ref 12–20)
CALCIUM SERPL-MCNC: 8.4 MG/DL (ref 8.5–10.1)
CHLORIDE SERPL-SCNC: 105 MMOL/L (ref 100–111)
CO2 SERPL-SCNC: 32 MMOL/L (ref 21–32)
CREAT SERPL-MCNC: 0.85 MG/DL (ref 0.6–1.3)
GLUCOSE BLD STRIP.AUTO-MCNC: 269 MG/DL (ref 70–110)
GLUCOSE BLD STRIP.AUTO-MCNC: 279 MG/DL (ref 70–110)
GLUCOSE BLD STRIP.AUTO-MCNC: 282 MG/DL (ref 70–110)
GLUCOSE SERPL-MCNC: 233 MG/DL (ref 74–99)
MAGNESIUM SERPL-MCNC: 2.4 MG/DL (ref 1.6–2.6)
PHOSPHATE SERPL-MCNC: 1.9 MG/DL (ref 2.5–4.9)
POTASSIUM SERPL-SCNC: 3.5 MMOL/L (ref 3.5–5.5)
SODIUM SERPL-SCNC: 145 MMOL/L (ref 136–145)

## 2019-08-14 PROCEDURE — 77010033678 HC OXYGEN DAILY

## 2019-08-14 PROCEDURE — 83735 ASSAY OF MAGNESIUM: CPT

## 2019-08-14 PROCEDURE — 80048 BASIC METABOLIC PNL TOTAL CA: CPT

## 2019-08-14 PROCEDURE — 36415 COLL VENOUS BLD VENIPUNCTURE: CPT

## 2019-08-14 PROCEDURE — 74011636637 HC RX REV CODE- 636/637: Performed by: SURGERY

## 2019-08-14 PROCEDURE — 65270000029 HC RM PRIVATE

## 2019-08-14 PROCEDURE — 84100 ASSAY OF PHOSPHORUS: CPT

## 2019-08-14 PROCEDURE — 82962 GLUCOSE BLOOD TEST: CPT

## 2019-08-14 PROCEDURE — 74011000250 HC RX REV CODE- 250: Performed by: SURGERY

## 2019-08-14 PROCEDURE — 74011000258 HC RX REV CODE- 258: Performed by: SURGERY

## 2019-08-14 PROCEDURE — 74011250636 HC RX REV CODE- 250/636: Performed by: SURGERY

## 2019-08-14 RX ORDER — INSULIN GLARGINE 100 [IU]/ML
8 INJECTION, SOLUTION SUBCUTANEOUS DAILY
Status: DISCONTINUED | OUTPATIENT
Start: 2019-08-14 | End: 2019-08-15

## 2019-08-14 RX ADMIN — KETOROLAC TROMETHAMINE 30 MG: 30 INJECTION, SOLUTION INTRAMUSCULAR at 11:22

## 2019-08-14 RX ADMIN — PIPERACILLIN SODIUM,TAZOBACTAM SODIUM 3.38 G: 3; .375 INJECTION, POWDER, FOR SOLUTION INTRAVENOUS at 13:13

## 2019-08-14 RX ADMIN — Medication 10 ML: at 13:19

## 2019-08-14 RX ADMIN — KETOROLAC TROMETHAMINE 30 MG: 30 INJECTION, SOLUTION INTRAMUSCULAR at 17:04

## 2019-08-14 RX ADMIN — INSULIN GLARGINE 8 UNITS: 100 INJECTION, SOLUTION SUBCUTANEOUS at 10:00

## 2019-08-14 RX ADMIN — PIPERACILLIN SODIUM,TAZOBACTAM SODIUM 3.38 G: 3; .375 INJECTION, POWDER, FOR SOLUTION INTRAVENOUS at 06:16

## 2019-08-14 RX ADMIN — INSULIN LISPRO 9 UNITS: 100 INJECTION, SOLUTION INTRAVENOUS; SUBCUTANEOUS at 19:12

## 2019-08-14 RX ADMIN — KETOROLAC TROMETHAMINE 30 MG: 30 INJECTION, SOLUTION INTRAMUSCULAR at 06:16

## 2019-08-14 RX ADMIN — ONDANSETRON 6 MG: 2 INJECTION INTRAMUSCULAR; INTRAVENOUS at 11:22

## 2019-08-14 RX ADMIN — HYDROMORPHONE HYDROCHLORIDE 0.5 MG: 1 INJECTION, SOLUTION INTRAMUSCULAR; INTRAVENOUS; SUBCUTANEOUS at 13:13

## 2019-08-14 RX ADMIN — POTASSIUM CHLORIDE: 2 INJECTION, SOLUTION, CONCENTRATE INTRAVENOUS at 21:31

## 2019-08-14 RX ADMIN — Medication 10 ML: at 22:00

## 2019-08-14 RX ADMIN — KETOROLAC TROMETHAMINE 30 MG: 30 INJECTION, SOLUTION INTRAMUSCULAR at 00:18

## 2019-08-14 RX ADMIN — INSULIN LISPRO 9 UNITS: 100 INJECTION, SOLUTION INTRAVENOUS; SUBCUTANEOUS at 13:16

## 2019-08-14 RX ADMIN — Medication 10 ML: at 13:17

## 2019-08-14 RX ADMIN — SODIUM CHLORIDE, SODIUM LACTATE, POTASSIUM CHLORIDE, AND CALCIUM CHLORIDE 25 ML/HR: 600; 310; 30; 20 INJECTION, SOLUTION INTRAVENOUS at 13:37

## 2019-08-14 RX ADMIN — INSULIN LISPRO 6 UNITS: 100 INJECTION, SOLUTION INTRAVENOUS; SUBCUTANEOUS at 06:00

## 2019-08-14 RX ADMIN — HYDROMORPHONE HYDROCHLORIDE 0.5 MG: 1 INJECTION, SOLUTION INTRAMUSCULAR; INTRAVENOUS; SUBCUTANEOUS at 18:50

## 2019-08-14 RX ADMIN — HYDROMORPHONE HYDROCHLORIDE 0.5 MG: 1 INJECTION, SOLUTION INTRAMUSCULAR; INTRAVENOUS; SUBCUTANEOUS at 16:51

## 2019-08-14 RX ADMIN — PIPERACILLIN SODIUM,TAZOBACTAM SODIUM 3.38 G: 3; .375 INJECTION, POWDER, FOR SOLUTION INTRAVENOUS at 21:30

## 2019-08-14 NOTE — PROGRESS NOTES
Problem: Diabetes Self-Management  Goal: *Disease process and treatment process  Description  Define diabetes and identify own type of diabetes; list 3 options for treating diabetes. Outcome: Progressing Towards Goal  Goal: *Incorporating nutritional management into lifestyle  Description  Describe effect of type, amount and timing of food on blood glucose; list 3 methods for planning meals. Outcome: Progressing Towards Goal  Goal: *Incorporating physical activity into lifestyle  Description  State effect of exercise on blood glucose levels. Outcome: Progressing Towards Goal  Goal: *Developing strategies to promote health/change behavior  Description  Define the ABC's of diabetes; identify appropriate screenings, schedule and personal plan for screenings. Outcome: Progressing Towards Goal  Goal: *Using medications safely  Description  State effect of diabetes medications on diabetes; name diabetes medication taking, action and side effects. Outcome: Progressing Towards Goal  Goal: *Monitoring blood glucose, interpreting and using results  Description  Identify recommended blood glucose targets  and personal targets. Outcome: Progressing Towards Goal  Goal: *Prevention, detection, treatment of acute complications  Description  List symptoms of hyper- and hypoglycemia; describe how to treat low blood sugar and actions for lowering  high blood glucose level. Outcome: Progressing Towards Goal  Goal: *Prevention, detection and treatment of chronic complications  Description  Define the natural course of diabetes and describe the relationship of blood glucose levels to long term complications of diabetes.   Outcome: Progressing Towards Goal  Goal: *Developing strategies to address psychosocial issues  Description  Describe feelings about living with diabetes; identify support needed and support network  Outcome: Progressing Towards Goal  Goal: *Sick day guidelines  Outcome: Progressing Towards Goal  Goal: *Patient Specific Goal (EDIT GOAL, INSERT TEXT)  Outcome: Progressing Towards Goal     Problem: Patient Education: Go to Patient Education Activity  Goal: Patient/Family Education  Outcome: Progressing Towards Goal     Problem: Falls - Risk of  Goal: *Absence of Falls  Description  Document Najma Degroot Fall Risk and appropriate interventions in the flowsheet. Outcome: Progressing Towards Goal  Note:   Fall Risk Interventions:  Mobility Interventions: Patient to call before getting OOB    Mentation Interventions: Update white board, Room close to nurse's station, Eyeglasses and hearing aids, Door open when patient unattended, Adequate sleep, hydration, pain control    Medication Interventions: Patient to call before getting OOB, Teach patient to arise slowly    Elimination Interventions: Call light in reach, Stay With Me (per policy), Patient to call for help with toileting needs              Problem: Patient Education: Go to Patient Education Activity  Goal: Patient/Family Education  Outcome: Progressing Towards Goal     Problem: Discharge Planning  Goal: *Discharge to safe environment  Outcome: Progressing Towards Goal     Problem: Pressure Injury - Risk of  Goal: *Prevention of pressure injury  Description  Document Cristi Scale and appropriate interventions in the flowsheet.   Outcome: Progressing Towards Goal  Note:   Pressure Injury Interventions:  Sensory Interventions: Assess changes in LOC    Moisture Interventions: Absorbent underpads, Apply protective barrier, creams and emollients, Internal/External urinary devices, Moisture barrier, Minimize layers    Activity Interventions: Increase time out of bed, Pressure redistribution bed/mattress(bed type)    Mobility Interventions: Assess need for specialty bed, HOB 30 degrees or less, Pressure redistribution bed/mattress (bed type)    Nutrition Interventions: Document food/fluid/supplement intake, Offer support with meals,snacks and hydration    Friction and Shear Interventions: Apply protective barrier, creams and emollients, HOB 30 degrees or less, Lift sheet                Problem: Patient Education: Go to Patient Education Activity  Goal: Patient/Family Education  Outcome: Progressing Towards Goal     Problem: Pain  Goal: *Control of Pain  Outcome: Progressing Towards Goal     Problem: Patient Education: Go to Patient Education Activity  Goal: Patient/Family Education  Outcome: Progressing Towards Goal     Problem: Patient Education: Go to Patient Education Activity  Goal: Patient/Family Education  Outcome: Progressing Towards Goal     Problem: Surgical Pathway Post-Op Day 2 through Discharge  Goal: Off Pathway (Use only if patient is Off Pathway)  Outcome: Progressing Towards Goal  Goal: Activity/Safety  Outcome: Progressing Towards Goal  Goal: Nutrition/Diet  Outcome: Progressing Towards Goal  Goal: Discharge Planning  Outcome: Progressing Towards Goal  Goal: Medications  Outcome: Progressing Towards Goal  Goal: Respiratory  Outcome: Progressing Towards Goal  Goal: Treatments/Interventions/Procedures  Outcome: Progressing Towards Goal  Goal: Psychosocial  Outcome: Progressing Towards Goal  Goal: *No signs and symptoms of infection or wound complications  Outcome: Progressing Towards Goal  Goal: *Optimal pain control at patient's stated goal  Outcome: Progressing Towards Goal  Goal: *Adequate urinary output (equal to or greater than 30 milliliters/hour)  Outcome: Progressing Towards Goal  Goal: *Hemodynamically stable  Outcome: Progressing Towards Goal  Goal: *Tolerating diet  Outcome: Progressing Towards Goal  Goal: *Demonstrates progressive activity  Outcome: Progressing Towards Goal  Goal: *Lungs clear or at baseline  Outcome: Progressing Towards Goal     Problem: Surgical Pathway: Discharge Outcomes  Goal: *Hemodynamically stable  Outcome: Progressing Towards Goal  Goal: *Lungs clear or at baseline  Outcome: Progressing Towards Goal  Goal: *Demonstrates independent activity or return to baseline  Outcome: Progressing Towards Goal  Goal: *Optimal pain control at patient's stated goal  Outcome: Progressing Towards Goal  Goal: *Verbalizes understanding and describes prescribed diet  Outcome: Progressing Towards Goal  Goal: *Tolerating diet  Outcome: Progressing Towards Goal  Goal: *Verbalizes name, dosage, time, side effects, and number of days to continue medications  Outcome: Progressing Towards Goal  Goal: *No signs and symptoms of infection or wound complications  Outcome: Progressing Towards Goal  Goal: *Anxiety reduced or absent  Outcome: Progressing Towards Goal  Goal: *Understands and describes signs and symptoms to report to providers(Stroke Metric)  Outcome: Progressing Towards Goal  Goal: *Describes follow-up/return visits to physicians  Outcome: Progressing Towards Goal  Goal: *Describes available resources and support systems  Outcome: Progressing Towards Goal     Problem: Nutrition Deficit  Goal: *Optimize nutritional status  Outcome: Progressing Towards Goal

## 2019-08-14 NOTE — PROGRESS NOTES
Patient seen and examined. She is doing well. The patient had multiple flatus today but no bowel movement. TPN was started. She has no fever and she has mild tachycardia. Her abdomen is soft and nontender and her midline wound is c more lean  Her BOBBY drain has drained about 140 cc in the last 24-hour. It still brownish in color. Her NG tube is still high output.     Plan:  Appreciate nutrition input  NPO  TPN  Close observation of her BOBBY output  Close monitoring for the development of any intra-abdominal collection  IV antibiotics  Ambulation

## 2019-08-14 NOTE — ROUTINE PROCESS
Attempted to discuss code status with patient. Gave examples of a full code and a DNR. Patient stated that she has to speak over her options with her mother. 0745  Bedside and Verbal shift change report given to Evan Castle RN ([place]) (oncoming nurse) by Sobia Villa RN, BSN 
 (offgoing nurse). Report included the following information SBAR, Kardex, Procedure Summary, Intake/Output, MAR and Recent Results.   
 
Sobia Villa RN, BSN

## 2019-08-14 NOTE — DIABETES MGMT
NUTRITIONAL RE-ASSESSMENT AND GLYCEMIC CONTROL PLAN OF CARE     Patrick Damian           61 y.o.           8/10/2019                 1. SBO (small bowel obstruction) ([facility])      ASSESSMENT:   Nutrition dx:  Pt with inadequate oral energy and protein intake r/t  NPO status ,concern for fistula ,inability to use enteral route, requiring parenteral nutrition. INTERVENTIONS/PLAN:   Recommend: 85 g amino acids/d 300 g dextrose/d with 250ml 20%  Intralipid at 100ml/hour. TPN will provide 85 g protein, 1520 non-protein calories/d. Recommend 8 units Lantus daily plus VIR corrective lispro  while on TPN. SUBJECTIVE/OBJECTIVE:   Diet: npo  No data found.   Medications: [x]                Reviewed     Most Recent POC Glucose:   Recent Labs     08/14/19  0425 08/13/19  0355 08/12/19  0100   * 113* 115*         Labs:   Lab Results   Component Value Date/Time    Hemoglobin A1c 6.6 (H) 08/10/2019 11:40 AM   equivalent  to ave Blood Glucose of 138mg/dl for 2-3 months prior to admission    Lab Results   Component Value Date/Time    Sodium 145 08/14/2019 04:25 AM    Potassium 3.5 08/14/2019 04:25 AM    Chloride 105 08/14/2019 04:25 AM    CO2 32 08/14/2019 04:25 AM    Anion gap 8 08/14/2019 04:25 AM    Glucose 233 (H) 08/14/2019 04:25 AM    BUN 21 (H) 08/14/2019 04:25 AM    Creatinine 0.85 08/14/2019 04:25 AM    Calcium 8.4 (L) 08/14/2019 04:25 AM    Magnesium 2.4 08/14/2019 04:25 AM    Phosphorus 1.9 (L) 08/14/2019 04:25 AM    Albumin 2.4 (L) 08/10/2019 01:34 PM       Anthropometrics:  ,  , BMI (calculated): 29.9  Wt Readings from Last 1 Encounters:   08/14/19 79.1 kg (174 lb 6.4 oz)      Ht Readings from Last 1 Encounters:   08/10/19 5' 4\" (1.626 m)     Estimated Nutrition Needs: 3147 Kcals/day, Protein (g): 84 g Fluid (ml): 2400 ml  Based on:   [x]          Actual BW    []          IBW   []            Adjusted BW      Nutrition Diagnoses: as stated above     Nutrition Interventions: TPN  Goal:     Wt maintenance by 8/24. Intake will meet >75% of energy and protein requirements by 8/19. Glucose will be within target range of 70-180mg/dl by  8/17.     Nutrition Monitoring and Evaluation      []     Monitor po intake on meal rounds  [x]     Continue inpatient monitoring and intervention  []     Other:      Nutrition Risk:  [x]   High     []  Moderate    []  Minimal/Uncompromised    Karen Shelley RD  [facility] [phone]

## 2019-08-15 LAB
ANION GAP SERPL CALC-SCNC: 4 MMOL/L (ref 3–18)
BASOPHILS # BLD: 0 K/UL (ref 0–0.1)
BASOPHILS NFR BLD: 0 % (ref 0–3)
BUN SERPL-MCNC: 23 MG/DL (ref 7–18)
BUN/CREAT SERPL: 28 (ref 12–20)
CALCIUM SERPL-MCNC: 8.5 MG/DL (ref 8.5–10.1)
CHLORIDE SERPL-SCNC: 105 MMOL/L (ref 100–111)
CO2 SERPL-SCNC: 34 MMOL/L (ref 21–32)
CREAT SERPL-MCNC: 0.81 MG/DL (ref 0.6–1.3)
DIFFERENTIAL METHOD BLD: ABNORMAL
EOSINOPHIL # BLD: 0.2 K/UL (ref 0–0.4)
EOSINOPHIL NFR BLD: 2 % (ref 0–5)
ERYTHROCYTE [DISTWIDTH] IN BLOOD BY AUTOMATED COUNT: 17.1 % (ref 11.6–14.5)
GLUCOSE BLD STRIP.AUTO-MCNC: 238 MG/DL (ref 70–110)
GLUCOSE BLD STRIP.AUTO-MCNC: 254 MG/DL (ref 70–110)
GLUCOSE BLD STRIP.AUTO-MCNC: 263 MG/DL (ref 70–110)
GLUCOSE BLD STRIP.AUTO-MCNC: 307 MG/DL (ref 70–110)
GLUCOSE BLD STRIP.AUTO-MCNC: 325 MG/DL (ref 70–110)
GLUCOSE SERPL-MCNC: 190 MG/DL (ref 74–99)
HCT VFR BLD AUTO: 22.7 % (ref 35–45)
HGB BLD-MCNC: 7.1 G/DL (ref 12–16)
LYMPHOCYTES # BLD: 1.1 K/UL (ref 0.8–3.5)
LYMPHOCYTES NFR BLD: 15 % (ref 20–51)
MAGNESIUM SERPL-MCNC: 2.2 MG/DL (ref 1.6–2.6)
MCH RBC QN AUTO: 27.8 PG (ref 24–34)
MCHC RBC AUTO-ENTMCNC: 31.3 G/DL (ref 31–37)
MCV RBC AUTO: 89 FL (ref 74–97)
MONOCYTES # BLD: 0.2 K/UL (ref 0–1)
MONOCYTES NFR BLD: 3 % (ref 2–9)
NEUTS BAND NFR BLD MANUAL: 2 % (ref 0–5)
NEUTS SEG # BLD: 5.9 K/UL (ref 1.8–8)
NEUTS SEG NFR BLD: 78 % (ref 42–75)
PHOSPHATE SERPL-MCNC: 2.2 MG/DL (ref 2.5–4.9)
PLATELET # BLD AUTO: 524 K/UL (ref 135–420)
PLATELET COMMENTS,PCOM: ABNORMAL
PMV BLD AUTO: 9.7 FL (ref 9.2–11.8)
POTASSIUM SERPL-SCNC: 3.9 MMOL/L (ref 3.5–5.5)
RBC # BLD AUTO: 2.55 M/UL (ref 4.2–5.3)
RBC MORPH BLD: ABNORMAL
SODIUM SERPL-SCNC: 143 MMOL/L (ref 136–145)
WBC # BLD AUTO: 7.5 K/UL (ref 4.6–13.2)

## 2019-08-15 PROCEDURE — 74011250636 HC RX REV CODE- 250/636: Performed by: SURGERY

## 2019-08-15 PROCEDURE — 65270000029 HC RM PRIVATE

## 2019-08-15 PROCEDURE — 84100 ASSAY OF PHOSPHORUS: CPT

## 2019-08-15 PROCEDURE — 80048 BASIC METABOLIC PNL TOTAL CA: CPT

## 2019-08-15 PROCEDURE — 77030038269 HC DRN EXT URIN PURWCK BARD -A

## 2019-08-15 PROCEDURE — 74011000250 HC RX REV CODE- 250: Performed by: SURGERY

## 2019-08-15 PROCEDURE — 74011636637 HC RX REV CODE- 636/637: Performed by: SURGERY

## 2019-08-15 PROCEDURE — 74011000258 HC RX REV CODE- 258: Performed by: SURGERY

## 2019-08-15 PROCEDURE — 77030011256 HC DRSG MEPILEX <16IN NO BORD MOLN -A

## 2019-08-15 PROCEDURE — 77010033678 HC OXYGEN DAILY

## 2019-08-15 PROCEDURE — 85025 COMPLETE CBC W/AUTO DIFF WBC: CPT

## 2019-08-15 PROCEDURE — 82962 GLUCOSE BLOOD TEST: CPT

## 2019-08-15 PROCEDURE — 83735 ASSAY OF MAGNESIUM: CPT

## 2019-08-15 RX ORDER — ONDANSETRON 2 MG/ML
6 INJECTION INTRAMUSCULAR; INTRAVENOUS
Status: DISCONTINUED | OUTPATIENT
Start: 2019-08-15 | End: 2019-08-22 | Stop reason: HOSPADM

## 2019-08-15 RX ORDER — INSULIN GLARGINE 100 [IU]/ML
15 INJECTION, SOLUTION SUBCUTANEOUS DAILY
Status: DISCONTINUED | OUTPATIENT
Start: 2019-08-15 | End: 2019-08-16

## 2019-08-15 RX ORDER — INSULIN GLARGINE 100 [IU]/ML
15 INJECTION, SOLUTION SUBCUTANEOUS DAILY
Status: DISCONTINUED | OUTPATIENT
Start: 2019-08-16 | End: 2019-08-15

## 2019-08-15 RX ADMIN — INSULIN LISPRO 6 UNITS: 100 INJECTION, SOLUTION INTRAVENOUS; SUBCUTANEOUS at 00:33

## 2019-08-15 RX ADMIN — Medication 10 ML: at 14:00

## 2019-08-15 RX ADMIN — HYDROMORPHONE HYDROCHLORIDE 0.5 MG: 1 INJECTION, SOLUTION INTRAMUSCULAR; INTRAVENOUS; SUBCUTANEOUS at 15:41

## 2019-08-15 RX ADMIN — ONDANSETRON 6 MG: 2 INJECTION INTRAMUSCULAR; INTRAVENOUS at 09:04

## 2019-08-15 RX ADMIN — PIPERACILLIN SODIUM,TAZOBACTAM SODIUM 3.38 G: 3; .375 INJECTION, POWDER, FOR SOLUTION INTRAVENOUS at 21:21

## 2019-08-15 RX ADMIN — DIPHENHYDRAMINE HYDROCHLORIDE 25 MG: 50 INJECTION, SOLUTION INTRAMUSCULAR; INTRAVENOUS at 01:29

## 2019-08-15 RX ADMIN — INSULIN GLARGINE 15 UNITS: 100 INJECTION, SOLUTION SUBCUTANEOUS at 11:00

## 2019-08-15 RX ADMIN — INSULIN LISPRO 9 UNITS: 100 INJECTION, SOLUTION INTRAVENOUS; SUBCUTANEOUS at 12:46

## 2019-08-15 RX ADMIN — POTASSIUM CHLORIDE: 2 INJECTION, SOLUTION, CONCENTRATE INTRAVENOUS at 21:09

## 2019-08-15 RX ADMIN — KETOROLAC TROMETHAMINE 30 MG: 30 INJECTION, SOLUTION INTRAMUSCULAR at 00:29

## 2019-08-15 RX ADMIN — HYDROMORPHONE HYDROCHLORIDE 0.5 MG: 1 INJECTION, SOLUTION INTRAMUSCULAR; INTRAVENOUS; SUBCUTANEOUS at 21:22

## 2019-08-15 RX ADMIN — DIPHENHYDRAMINE HYDROCHLORIDE 25 MG: 50 INJECTION, SOLUTION INTRAMUSCULAR; INTRAVENOUS at 09:04

## 2019-08-15 RX ADMIN — DIPHENHYDRAMINE HYDROCHLORIDE 25 MG: 50 INJECTION, SOLUTION INTRAMUSCULAR; INTRAVENOUS at 15:41

## 2019-08-15 RX ADMIN — Medication 10 ML: at 07:10

## 2019-08-15 RX ADMIN — KETOROLAC TROMETHAMINE 30 MG: 30 INJECTION, SOLUTION INTRAMUSCULAR at 12:46

## 2019-08-15 RX ADMIN — INSULIN LISPRO 12 UNITS: 100 INJECTION, SOLUTION INTRAVENOUS; SUBCUTANEOUS at 18:00

## 2019-08-15 RX ADMIN — HYDROMORPHONE HYDROCHLORIDE 0.5 MG: 1 INJECTION, SOLUTION INTRAMUSCULAR; INTRAVENOUS; SUBCUTANEOUS at 11:33

## 2019-08-15 RX ADMIN — PIPERACILLIN SODIUM,TAZOBACTAM SODIUM 3.38 G: 3; .375 INJECTION, POWDER, FOR SOLUTION INTRAVENOUS at 15:41

## 2019-08-15 RX ADMIN — HYDROMORPHONE HYDROCHLORIDE 0.5 MG: 1 INJECTION, SOLUTION INTRAMUSCULAR; INTRAVENOUS; SUBCUTANEOUS at 01:29

## 2019-08-15 RX ADMIN — HYDROMORPHONE HYDROCHLORIDE 0.5 MG: 1 INJECTION, SOLUTION INTRAMUSCULAR; INTRAVENOUS; SUBCUTANEOUS at 06:28

## 2019-08-15 RX ADMIN — INSULIN LISPRO 9 UNITS: 100 INJECTION, SOLUTION INTRAVENOUS; SUBCUTANEOUS at 06:28

## 2019-08-15 RX ADMIN — Medication 10 ML: at 12:47

## 2019-08-15 RX ADMIN — PIPERACILLIN SODIUM,TAZOBACTAM SODIUM 3.38 G: 3; .375 INJECTION, POWDER, FOR SOLUTION INTRAVENOUS at 06:27

## 2019-08-15 RX ADMIN — Medication 20 ML: at 12:00

## 2019-08-15 RX ADMIN — KETOROLAC TROMETHAMINE 30 MG: 30 INJECTION, SOLUTION INTRAMUSCULAR at 06:27

## 2019-08-15 NOTE — PROGRESS NOTES
Nurse has assessed pt quite frequently during the shift. Pt has become 1:1 care, pt NGT continues to produce dark green drainage, pt continues to produce tan to clear vomitus approximately 200ml thus far in the shift. Pt continues to dry heave although anti emetics are being administered routinely and as frequent as possible. Nurse gave pt complete bath changed all her linens and repositioned her. Pt continues to verbalize \"Yoselin I feel so bad\", pt vitals have been WDL however pt continues to express malaise.  PT JPD produces thick dark green drainage, pt urine is dark concentrated maryjane

## 2019-08-15 NOTE — PROGRESS NOTES
Patient seen and examined. She is doing well. Had one flatus again tonight. No BM. Had one episode of fever last night. WBC is improving. Her NG tube output is 200 overnight. Plan:  Continue with NG tube for one more day and possibly remove it tomorrow  NPO for now.  May be tomorrow I will start her on liquid diet and monitor her fistulae output closely  NPO  TPN  Ambulation

## 2019-08-16 ENCOUNTER — APPOINTMENT (OUTPATIENT)
Dept: CT IMAGING | Age: 59
DRG: 330 | End: 2019-08-16
Attending: SURGERY
Payer: COMMERCIAL

## 2019-08-16 LAB
ANION GAP SERPL CALC-SCNC: 5 MMOL/L (ref 3–18)
BACTERIA SPEC CULT: NORMAL
BACTERIA SPEC CULT: NORMAL
BASOPHILS # BLD: 0 K/UL (ref 0–0.1)
BASOPHILS NFR BLD: 0 % (ref 0–3)
BUN SERPL-MCNC: 22 MG/DL (ref 7–18)
BUN/CREAT SERPL: 27 (ref 12–20)
CALCIUM SERPL-MCNC: 8.7 MG/DL (ref 8.5–10.1)
CHLORIDE SERPL-SCNC: 104 MMOL/L (ref 100–111)
CO2 SERPL-SCNC: 31 MMOL/L (ref 21–32)
CREAT SERPL-MCNC: 0.81 MG/DL (ref 0.6–1.3)
DIFFERENTIAL METHOD BLD: ABNORMAL
EOSINOPHIL # BLD: 0.2 K/UL (ref 0–0.4)
EOSINOPHIL NFR BLD: 2 % (ref 0–5)
ERYTHROCYTE [DISTWIDTH] IN BLOOD BY AUTOMATED COUNT: 17.5 % (ref 11.6–14.5)
GLUCOSE BLD STRIP.AUTO-MCNC: 199 MG/DL (ref 70–110)
GLUCOSE BLD STRIP.AUTO-MCNC: 277 MG/DL (ref 70–110)
GLUCOSE BLD STRIP.AUTO-MCNC: 278 MG/DL (ref 70–110)
GLUCOSE BLD STRIP.AUTO-MCNC: 300 MG/DL (ref 70–110)
GLUCOSE SERPL-MCNC: 274 MG/DL (ref 74–99)
HCT VFR BLD AUTO: 23.6 % (ref 35–45)
HGB BLD-MCNC: 7.3 G/DL (ref 12–16)
LYMPHOCYTES # BLD: 1.8 K/UL (ref 0.8–3.5)
LYMPHOCYTES NFR BLD: 19 % (ref 20–51)
MCH RBC QN AUTO: 27.9 PG (ref 24–34)
MCHC RBC AUTO-ENTMCNC: 30.9 G/DL (ref 31–37)
MCV RBC AUTO: 90.1 FL (ref 74–97)
MONOCYTES # BLD: 1 K/UL (ref 0–1)
MONOCYTES NFR BLD: 10 % (ref 2–9)
NEUTS BAND NFR BLD MANUAL: 1 % (ref 0–5)
NEUTS SEG # BLD: 6.6 K/UL (ref 1.8–8)
NEUTS SEG NFR BLD: 68 % (ref 42–75)
PLATELET # BLD AUTO: 586 K/UL (ref 135–420)
PLATELET COMMENTS,PCOM: ABNORMAL
PMV BLD AUTO: 9.7 FL (ref 9.2–11.8)
POTASSIUM SERPL-SCNC: 4.6 MMOL/L (ref 3.5–5.5)
RBC # BLD AUTO: 2.62 M/UL (ref 4.2–5.3)
RBC MORPH BLD: ABNORMAL
RBC MORPH BLD: ABNORMAL
SERVICE CMNT-IMP: NORMAL
SERVICE CMNT-IMP: NORMAL
SODIUM SERPL-SCNC: 140 MMOL/L (ref 136–145)
WBC # BLD AUTO: 9.7 K/UL (ref 4.6–13.2)

## 2019-08-16 PROCEDURE — 82962 GLUCOSE BLOOD TEST: CPT

## 2019-08-16 PROCEDURE — 74177 CT ABD & PELVIS W/CONTRAST: CPT

## 2019-08-16 PROCEDURE — 74011000250 HC RX REV CODE- 250: Performed by: RADIOLOGY

## 2019-08-16 PROCEDURE — 85025 COMPLETE CBC W/AUTO DIFF WBC: CPT

## 2019-08-16 PROCEDURE — 0W9J30Z DRAINAGE OF PELVIC CAVITY WITH DRAINAGE DEVICE, PERCUTANEOUS APPROACH: ICD-10-PCS | Performed by: RADIOLOGY

## 2019-08-16 PROCEDURE — 74011250636 HC RX REV CODE- 250/636: Performed by: NURSE PRACTITIONER

## 2019-08-16 PROCEDURE — 74011000258 HC RX REV CODE- 258: Performed by: SURGERY

## 2019-08-16 PROCEDURE — 74011250636 HC RX REV CODE- 250/636: Performed by: SURGERY

## 2019-08-16 PROCEDURE — 74011000250 HC RX REV CODE- 250: Performed by: SURGERY

## 2019-08-16 PROCEDURE — 87076 CULTURE ANAEROBE IDENT EACH: CPT

## 2019-08-16 PROCEDURE — 36415 COLL VENOUS BLD VENIPUNCTURE: CPT

## 2019-08-16 PROCEDURE — 74011250636 HC RX REV CODE- 250/636: Performed by: RADIOLOGY

## 2019-08-16 PROCEDURE — 77030038269 HC DRN EXT URIN PURWCK BARD -A

## 2019-08-16 PROCEDURE — 80048 BASIC METABOLIC PNL TOTAL CA: CPT

## 2019-08-16 PROCEDURE — 74011636320 HC RX REV CODE- 636/320: Performed by: SURGERY

## 2019-08-16 PROCEDURE — 74011636637 HC RX REV CODE- 636/637: Performed by: SURGERY

## 2019-08-16 PROCEDURE — 65270000029 HC RM PRIVATE

## 2019-08-16 PROCEDURE — 77010033678 HC OXYGEN DAILY

## 2019-08-16 PROCEDURE — 87070 CULTURE OTHR SPECIMN AEROBIC: CPT

## 2019-08-16 PROCEDURE — C1769 GUIDE WIRE: HCPCS

## 2019-08-16 PROCEDURE — 87075 CULTR BACTERIA EXCEPT BLOOD: CPT

## 2019-08-16 RX ORDER — INSULIN GLARGINE 100 [IU]/ML
10 INJECTION, SOLUTION SUBCUTANEOUS ONCE
Status: COMPLETED | OUTPATIENT
Start: 2019-08-16 | End: 2019-08-16

## 2019-08-16 RX ORDER — MIDAZOLAM HYDROCHLORIDE 1 MG/ML
.5-4 INJECTION, SOLUTION INTRAMUSCULAR; INTRAVENOUS
Status: DISCONTINUED | OUTPATIENT
Start: 2019-08-16 | End: 2019-08-17 | Stop reason: HOSPADM

## 2019-08-16 RX ORDER — LIDOCAINE HYDROCHLORIDE 10 MG/ML
1-20 INJECTION INFILTRATION; PERINEURAL
Status: COMPLETED | OUTPATIENT
Start: 2019-08-16 | End: 2019-08-16

## 2019-08-16 RX ORDER — INSULIN GLARGINE 100 [IU]/ML
25 INJECTION, SOLUTION SUBCUTANEOUS DAILY
Status: DISCONTINUED | OUTPATIENT
Start: 2019-08-17 | End: 2019-08-17

## 2019-08-16 RX ORDER — FENTANYL CITRATE 50 UG/ML
25-200 INJECTION, SOLUTION INTRAMUSCULAR; INTRAVENOUS
Status: DISCONTINUED | OUTPATIENT
Start: 2019-08-16 | End: 2019-08-17 | Stop reason: HOSPADM

## 2019-08-16 RX ADMIN — HYDROMORPHONE HYDROCHLORIDE 0.5 MG: 1 INJECTION, SOLUTION INTRAMUSCULAR; INTRAVENOUS; SUBCUTANEOUS at 04:00

## 2019-08-16 RX ADMIN — MIDAZOLAM 1 MG: 1 INJECTION INTRAMUSCULAR; INTRAVENOUS at 14:48

## 2019-08-16 RX ADMIN — MIDAZOLAM 1 MG: 1 INJECTION INTRAMUSCULAR; INTRAVENOUS at 14:43

## 2019-08-16 RX ADMIN — PIPERACILLIN SODIUM,TAZOBACTAM SODIUM 3.38 G: 3; .375 INJECTION, POWDER, FOR SOLUTION INTRAVENOUS at 06:57

## 2019-08-16 RX ADMIN — ONDANSETRON 6 MG: 2 SOLUTION INTRAMUSCULAR; INTRAVENOUS at 17:31

## 2019-08-16 RX ADMIN — HYDROMORPHONE HYDROCHLORIDE 0.5 MG: 1 INJECTION, SOLUTION INTRAMUSCULAR; INTRAVENOUS; SUBCUTANEOUS at 21:15

## 2019-08-16 RX ADMIN — HYDROMORPHONE HYDROCHLORIDE 0.5 MG: 1 INJECTION, SOLUTION INTRAMUSCULAR; INTRAVENOUS; SUBCUTANEOUS at 17:32

## 2019-08-16 RX ADMIN — INSULIN LISPRO 12 UNITS: 100 INJECTION, SOLUTION INTRAVENOUS; SUBCUTANEOUS at 06:59

## 2019-08-16 RX ADMIN — INSULIN LISPRO 3 UNITS: 100 INJECTION, SOLUTION INTRAVENOUS; SUBCUTANEOUS at 18:00

## 2019-08-16 RX ADMIN — PIPERACILLIN SODIUM,TAZOBACTAM SODIUM 3.38 G: 3; .375 INJECTION, POWDER, FOR SOLUTION INTRAVENOUS at 22:55

## 2019-08-16 RX ADMIN — HYDROMORPHONE HYDROCHLORIDE 0.5 MG: 1 INJECTION, SOLUTION INTRAMUSCULAR; INTRAVENOUS; SUBCUTANEOUS at 11:53

## 2019-08-16 RX ADMIN — INSULIN GLARGINE 15 UNITS: 100 INJECTION, SOLUTION SUBCUTANEOUS at 09:00

## 2019-08-16 RX ADMIN — SODIUM BICARBONATE 1 ML: 0.2 INJECTION, SOLUTION INTRAVENOUS at 14:48

## 2019-08-16 RX ADMIN — INSULIN LISPRO 9 UNITS: 100 INJECTION, SOLUTION INTRAVENOUS; SUBCUTANEOUS at 12:00

## 2019-08-16 RX ADMIN — MIDAZOLAM 0.5 MG: 1 INJECTION INTRAMUSCULAR; INTRAVENOUS at 14:59

## 2019-08-16 RX ADMIN — DIPHENHYDRAMINE HYDROCHLORIDE 25 MG: 50 INJECTION, SOLUTION INTRAMUSCULAR; INTRAVENOUS at 00:09

## 2019-08-16 RX ADMIN — HYDROMORPHONE HYDROCHLORIDE 0.5 MG: 1 INJECTION, SOLUTION INTRAMUSCULAR; INTRAVENOUS; SUBCUTANEOUS at 00:08

## 2019-08-16 RX ADMIN — HYDROMORPHONE HYDROCHLORIDE 0.5 MG: 1 INJECTION, SOLUTION INTRAMUSCULAR; INTRAVENOUS; SUBCUTANEOUS at 06:58

## 2019-08-16 RX ADMIN — ONDANSETRON 6 MG: 2 SOLUTION INTRAMUSCULAR; INTRAVENOUS at 21:15

## 2019-08-16 RX ADMIN — LIDOCAINE HYDROCHLORIDE 13 ML: 10 INJECTION, SOLUTION INFILTRATION; PERINEURAL at 14:48

## 2019-08-16 RX ADMIN — DIPHENHYDRAMINE HYDROCHLORIDE 25 MG: 50 INJECTION, SOLUTION INTRAMUSCULAR; INTRAVENOUS at 06:58

## 2019-08-16 RX ADMIN — FENTANYL CITRATE 25 MCG: 50 INJECTION, SOLUTION INTRAMUSCULAR; INTRAVENOUS at 14:59

## 2019-08-16 RX ADMIN — FENTANYL CITRATE 50 MCG: 50 INJECTION, SOLUTION INTRAMUSCULAR; INTRAVENOUS at 14:48

## 2019-08-16 RX ADMIN — INSULIN LISPRO 12 UNITS: 100 INJECTION, SOLUTION INTRAVENOUS; SUBCUTANEOUS at 00:08

## 2019-08-16 RX ADMIN — DIPHENHYDRAMINE HYDROCHLORIDE 25 MG: 50 INJECTION, SOLUTION INTRAMUSCULAR; INTRAVENOUS at 21:15

## 2019-08-16 RX ADMIN — ONDANSETRON 6 MG: 2 SOLUTION INTRAMUSCULAR; INTRAVENOUS at 11:47

## 2019-08-16 RX ADMIN — INSULIN GLARGINE 10 UNITS: 100 INJECTION, SOLUTION SUBCUTANEOUS at 12:00

## 2019-08-16 RX ADMIN — FENTANYL CITRATE 50 MCG: 50 INJECTION, SOLUTION INTRAMUSCULAR; INTRAVENOUS at 14:43

## 2019-08-16 RX ADMIN — Medication 20 ML: at 00:08

## 2019-08-16 RX ADMIN — IOPAMIDOL 98 ML: 612 INJECTION, SOLUTION INTRAVENOUS at 11:09

## 2019-08-16 RX ADMIN — PIPERACILLIN SODIUM,TAZOBACTAM SODIUM 3.38 G: 3; .375 INJECTION, POWDER, FOR SOLUTION INTRAVENOUS at 14:00

## 2019-08-16 RX ADMIN — HYDROMORPHONE HYDROCHLORIDE 0.5 MG: 1 INJECTION, SOLUTION INTRAMUSCULAR; INTRAVENOUS; SUBCUTANEOUS at 08:51

## 2019-08-16 RX ADMIN — POTASSIUM CHLORIDE: 2 INJECTION, SOLUTION, CONCENTRATE INTRAVENOUS at 23:00

## 2019-08-16 NOTE — PROGRESS NOTES
TRANSFER - OUT REPORT:    Verbal report given to Aye MCCLUER(name) on Raquel Garza  being transferred to (unit) for routine progression of care       Report consisted of patients Situation, Background, Assessment and   Recommendations(SBAR). Information from the following report(s) SBAR, Procedure Summary, MAR and Cardiac Rhythm ST was reviewed with the receiving nurse. Lines:   PICC Double Lumen 08/13/19 Right;Brachial (Active)   Central Line Being Utilized Yes 8/16/2019  4:32 AM   Criteria for Appropriate Use Total parenteral nutrition 8/16/2019  4:32 AM   Site Assessment Clean, dry, & intact 8/16/2019  4:32 AM   Phlebitis Assessment 0 8/16/2019  4:32 AM   Infiltration Assessment 0 8/16/2019  4:32 AM   Dressing Status Clean, dry, & intact 8/16/2019  4:32 AM   Action Taken Open ports on tubing capped 8/16/2019  4:32 AM   External Catheter Length (cm) 3 centimeters 8/16/2019  4:32 AM   Dressing Type Disk with Chlorhexadine gluconate (CHG); Transparent 8/16/2019  4:32 AM   Hub Color/Line Status Patent; Purple; Infusing 8/16/2019  4:32 AM   Positive Blood Return (Site #1) Yes 8/16/2019  4:32 AM   Hub Color/Line Status Red;Flushed;Patent 8/16/2019  4:32 AM   Positive Blood Return (Site #2) Yes 8/16/2019  4:32 AM   Alcohol Cap Used Yes 8/16/2019  4:32 AM       Peripheral IV 08/10/19 Anterior; Left External jugular (Active)   Site Assessment Clean, dry, & intact 8/15/2019 12:00 AM   Phlebitis Assessment 0 8/15/2019 12:00 AM   Infiltration Assessment 0 8/15/2019 12:00 AM   Dressing Status Clean, dry, & intact 8/15/2019 12:00 AM   Dressing Type Transparent 8/14/2019  8:00 PM   Hub Color/Line Status Green;Capped 8/14/2019  8:00 PM   Action Taken Open ports on tubing capped 8/14/2019  8:00 PM   Alcohol Cap Used Yes 8/14/2019  8:00 PM        Opportunity for questions and clarification was provided.       Patient transported with:   Bloxy

## 2019-08-16 NOTE — DIABETES MGMT
NUTRITIONAL RE-ASSESSMENT AND GLYCEMIC CONTROL PLAN OF CARE     Cherry Damian           61 y.o.           8/10/2019                 1. SBO (small bowel obstruction) ([facility]ly [facility])      ASSESSMENT:   Nutrition dx:  Pt with inadequate oral energy and protein intake r/t  NPO status ,concern for fistula ,inability to use enteral route, requiring parenteral nutrition. INTERVENTIONS/PLAN:   Recommend: 85 g amino acids/d 300 g dextrose/d with 250ml 20%  Intralipid at 100ml/hour. TPN will provide 85 g protein, 1520 non-protein calories/d. Recommend 25  units Lantus daily plus VIR corrective lispro  while on TPN. SUBJECTIVE/OBJECTIVE:   Diet: npo  No data found.   Medications: [x]                Reviewed     Most Recent POC Glucose:   Recent Labs     08/16/19  0400 08/15/19  0405 08/14/19  0425   * 190* 233*         Labs:   Lab Results   Component Value Date/Time    Hemoglobin A1c 6.6 (H) 08/10/2019 11:40 AM   equivalent  to ave Blood Glucose of 138mg/dl for 2-3 months prior to admission    Lab Results   Component Value Date/Time    Sodium 140 08/16/2019 04:00 AM    Potassium 4.6 08/16/2019 04:00 AM    Chloride 104 08/16/2019 04:00 AM    CO2 31 08/16/2019 04:00 AM    Anion gap 5 08/16/2019 04:00 AM    Glucose 274 (H) 08/16/2019 04:00 AM    BUN 22 (H) 08/16/2019 04:00 AM    Creatinine 0.81 08/16/2019 04:00 AM    Calcium 8.7 08/16/2019 04:00 AM    Magnesium 2.2 08/15/2019 04:05 AM    Phosphorus 2.2 (L) 08/15/2019 04:05 AM    Albumin 2.4 (L) 08/10/2019 01:34 PM       Anthropometrics:  ,  , BMI (calculated): 29.9  Wt Readings from Last 1 Encounters:   08/14/19 79.1 kg (174 lb 6.4 oz)      Ht Readings from Last 1 Encounters:   08/10/19 5' 4\" (1.626 m)     Estimated Nutrition Needs: 4841 Kcals/day, Protein (g): 84 g Fluid (ml): 2400 ml  Based on:   [x]          Actual BW    []          IBW   []            Adjusted BW      Nutrition Diagnoses: as stated above     Nutrition Interventions: TPN  Goal:     Wt maintenance by 8/26.   Intake will meet >75% of energy and protein requirements by 8/21. Glucose will be within target range of 70-180mg/dl by  8/19.     Nutrition Monitoring and Evaluation      []     Monitor po intake on meal rounds  [x]     Continue inpatient monitoring and intervention  []     Other:      Nutrition Risk:  [x]   High     []  Moderate    []  Minimal/Uncompromised    Alisa Thomas RD  [facility]UST [phone]

## 2019-08-16 NOTE — PROGRESS NOTES
Spoke with pt, and her mother, with her permission. Saw note for possible long term TPN. explained to pt, she  can receive TPN at home, will do cost out and see how much ins will cover. Also if goes to a facility, will need to find one that will accept tpn, insurance [facility] need [facility]  [facility], will have to have other needs as well such as therapy. Gave snf list for them to give me choices, then will call and see if will accept tpn on a snf level of care. Will leave fyi for therapy eval when appropriate for pt. Plan snf/ltac vs home with hh and tpn. Called lupe from [facility] to see if any of [facility]'s snf accept tpn. She will call me back. Called [facility] they only accept tpn on ltac level of care. tpn alone does not qualify pt for ltac level of care. Posted in cc link to [address] [place] [address], in case pt should qualify. [facility] does not accept tpn. [address] [place] [address] , [facility] and [facility], [facility]  are  Only facilities in network with [employer]. [facility] (SNF) Provider list has been given to the patient and/or patient representative. SNF Provider list  has been placed on the chart. Unable to post to [facility], site down. Spoke with Shen, pt will go with ngt, tpn, multi drains. She would likely qualify for ltac . Unable to post in edc , is down posted to [facility] for now. First choice for ltac is [facility] 1st choice for snf is [facility].

## 2019-08-16 NOTE — PROGRESS NOTES
Pt educated about procedure and sedation, verbalizes understanding. Pt denies complications from sedation/anesthesia in the past. Pt has been NPO since admission. Chart reviewed including labs and medications. Will continue to monitor.

## 2019-08-16 NOTE — PROGRESS NOTES
VASCULAR & INTERVENTIONAL RADIOLOGY PROGRESS NOTE    History and Physical reviewed; I have examined the patient and there are no pertinent changes. Pt is an appropriate candidate to undergo CT guided drainage catheter placement under moderate sedation.       Christine Westbrook MD  Vascular & Interventional Radiology  [address] [place] [address] [facility]  8/16/2019

## 2019-08-16 NOTE — PROGRESS NOTES
Patient seen and examined. She has stated that she had some abdominal pain last night. 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 She also had fever one time. Her WBC is normal. Her creatinine is normal.  Her abdomen is soft and non-distended with minimal tenderness. 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 Her increased output from her NG tube and her fistula (BOBBY), could be secondary to her drinking some water but I am not sure. Continue with TPN, NPO and IV antibiotics  Ambulation  Plan for discharge planing to a rehab place once things are better controlled with her fistulae for long term care. She will need a facility that give TPN.

## 2019-08-16 NOTE — PROGRESS NOTES
Patient continue NPO, Ng tube in place and draining green. Purwick in place. Jed drain draining continually  amount.          2122: Dilaudid 0.5 mg IV was given for miroslava, Jed drain emptied, patient has 1 bout of fvomited 100ml        0008: Dilaudid 0.5 mg IV was given for pain      0400: Dilaudid 0.5 mg IV was given for pain

## 2019-08-16 NOTE — PROGRESS NOTES
completed follow up visit with   Patient and family in room 2206 this morning. and a Spiritual assessment of patient was done. .Patient reported that she was not having a good morning and needed some extra help from God today. Offered prayer with patient and family members.  Chaplains will continue to follow and will provide pastoral care on an as needed/requested basis    Chaplain Gianfranco Javier   Board Certified [address] [place] [address]   [phone]

## 2019-08-16 NOTE — PROGRESS NOTES
VASCULAR & INTERVENTIONAL RADIOLOGY PROGRESS NOTE    IR consulted for percutaneous drainage catheter placement in patient who had laparotomy, lysis of adhesions, small bowel resection with primary anastomosis. CT from earlier today shows extraluminal contrast highly suggestive of anastomotic breakdown. Surgically placed drainage catheter is located in collection adjacent to suspected breakdown site. Discussed case with Dr. Melody Tello. He states going back surgically is not an option as patient has had many abdominal surgeries and will make patient worse as well as patient not currently having a leukocytosis or fever. He has requested drainage of the lower pelvic collection. This procedure will not take care of the anastomotic issues and the other smaller collections. Dr Melody Tello is hoping that it will heal on its own. He plans to follow up with imaging on Monday. Will proceed with drainage of right paramidline pelvic collection.     Cherie Cr MD  Vascular & Interventional Radiology  [facility]  8/16/2019

## 2019-08-16 NOTE — PROGRESS NOTES
I got a call from the radiologist stating that the CT scan showed an anastomosis dehiscence with leak. I explained that we already are aware of the leak because she had the fistulae with stool drainage from the BOBBY drain for 5 days but I wanted to make sure that there is no collection. He said that there is multiple collections, largest measuring 9 by 7 by 5 cm. So I asked him to please ask IR to place a drain.

## 2019-08-16 NOTE — PROCEDURES
VASCULAR & INTERVENTIONAL RADIOLOGY    Procedure: CT Guided Abscess Drainage Catheter Placement    Pre-operative Diagnosis:  S/P partial small bowel resection with primary anastomosis. Anastomotic leak. Large pelvic collection. Post-operative Diagnosis: Same; hematoma. Sedation: Versed and fentanyl. Procedure Details: Informed consent obtained from patient prior to procedure. Standard aseptic technique. CT guidance. 1% lidocaine for local anesthesia. 10 Fr locking pigtail APDL. 10 mL serosanguineous fluid, sample sent. Specimen: sent to lab. Complications:  None. EBL: minimal           Disposition: To floor. Condition: Stable. Impression:    Successful drainage catheter placement    Recommendations:  Leave drain to J-P suction. Hold any anticoagulation until am.  Flush catheter daily.          Jonathan Wang MD  [facility]  Vascular & Interventional Radiology  8/16/2019

## 2019-08-16 NOTE — PROGRESS NOTES
Patient evaluated, appears comfortable, no distress  She reports pain to be well managed  NG output not draining, I flushed the NG with 30ml of saline. NG fluids began draining into canister after flushed. BOBBY drain output 35ml of thick, green succus. BOBBY closed to bulb suction. Patient await IR for additional drain placements.

## 2019-08-17 ENCOUNTER — APPOINTMENT (OUTPATIENT)
Dept: GENERAL RADIOLOGY | Age: 59
DRG: 330 | End: 2019-08-17
Attending: SURGERY
Payer: COMMERCIAL

## 2019-08-17 LAB
ANION GAP SERPL CALC-SCNC: 3 MMOL/L (ref 3–18)
BASOPHILS # BLD: 0 K/UL (ref 0–0.1)
BASOPHILS NFR BLD: 0 % (ref 0–3)
BUN SERPL-MCNC: 17 MG/DL (ref 7–18)
BUN/CREAT SERPL: 19 (ref 12–20)
CALCIUM SERPL-MCNC: 8.6 MG/DL (ref 8.5–10.1)
CHLORIDE SERPL-SCNC: 101 MMOL/L (ref 100–111)
CO2 SERPL-SCNC: 32 MMOL/L (ref 21–32)
CREAT SERPL-MCNC: 0.89 MG/DL (ref 0.6–1.3)
DIFFERENTIAL METHOD BLD: ABNORMAL
EOSINOPHIL # BLD: 0.3 K/UL (ref 0–0.4)
EOSINOPHIL NFR BLD: 3 % (ref 0–5)
ERYTHROCYTE [DISTWIDTH] IN BLOOD BY AUTOMATED COUNT: 17.5 % (ref 11.6–14.5)
GLUCOSE BLD STRIP.AUTO-MCNC: 242 MG/DL (ref 70–110)
GLUCOSE BLD STRIP.AUTO-MCNC: 271 MG/DL (ref 70–110)
GLUCOSE BLD STRIP.AUTO-MCNC: 289 MG/DL (ref 70–110)
GLUCOSE SERPL-MCNC: 229 MG/DL (ref 74–99)
HCT VFR BLD AUTO: 24.3 % (ref 35–45)
HGB BLD-MCNC: 7.3 G/DL (ref 12–16)
LYMPHOCYTES # BLD: 2.4 K/UL (ref 0.8–3.5)
LYMPHOCYTES NFR BLD: 22 % (ref 20–51)
MAGNESIUM SERPL-MCNC: 2 MG/DL (ref 1.6–2.6)
MCH RBC QN AUTO: 27.4 PG (ref 24–34)
MCHC RBC AUTO-ENTMCNC: 30 G/DL (ref 31–37)
MCV RBC AUTO: 91.4 FL (ref 74–97)
MONOCYTES # BLD: 0.5 K/UL (ref 0–1)
MONOCYTES NFR BLD: 5 % (ref 2–9)
NEUTS BAND NFR BLD MANUAL: 2 % (ref 0–5)
NEUTS SEG # BLD: 7.3 K/UL (ref 1.8–8)
NEUTS SEG NFR BLD: 68 % (ref 42–75)
PHOSPHATE SERPL-MCNC: 2.6 MG/DL (ref 2.5–4.9)
PLATELET # BLD AUTO: 593 K/UL (ref 135–420)
PLATELET COMMENTS,PCOM: ABNORMAL
PMV BLD AUTO: 9.6 FL (ref 9.2–11.8)
POTASSIUM SERPL-SCNC: 4.2 MMOL/L (ref 3.5–5.5)
RBC # BLD AUTO: 2.66 M/UL (ref 4.2–5.3)
RBC MORPH BLD: ABNORMAL
SODIUM SERPL-SCNC: 136 MMOL/L (ref 136–145)
TRIGL SERPL-MCNC: 314 MG/DL (ref ?–150)
WBC # BLD AUTO: 10.7 K/UL (ref 4.6–13.2)

## 2019-08-17 PROCEDURE — 74011636637 HC RX REV CODE- 636/637: Performed by: SURGERY

## 2019-08-17 PROCEDURE — 85025 COMPLETE CBC W/AUTO DIFF WBC: CPT

## 2019-08-17 PROCEDURE — 36592 COLLECT BLOOD FROM PICC: CPT

## 2019-08-17 PROCEDURE — 82962 GLUCOSE BLOOD TEST: CPT

## 2019-08-17 PROCEDURE — 74011250636 HC RX REV CODE- 250/636: Performed by: NURSE PRACTITIONER

## 2019-08-17 PROCEDURE — 84478 ASSAY OF TRIGLYCERIDES: CPT

## 2019-08-17 PROCEDURE — 74018 RADEX ABDOMEN 1 VIEW: CPT

## 2019-08-17 PROCEDURE — 74011250636 HC RX REV CODE- 250/636: Performed by: SURGERY

## 2019-08-17 PROCEDURE — 74011000258 HC RX REV CODE- 258: Performed by: SURGERY

## 2019-08-17 PROCEDURE — 74011000250 HC RX REV CODE- 250: Performed by: SURGERY

## 2019-08-17 PROCEDURE — 65270000029 HC RM PRIVATE

## 2019-08-17 PROCEDURE — 80048 BASIC METABOLIC PNL TOTAL CA: CPT

## 2019-08-17 PROCEDURE — 83735 ASSAY OF MAGNESIUM: CPT

## 2019-08-17 PROCEDURE — 77010033678 HC OXYGEN DAILY

## 2019-08-17 PROCEDURE — 84100 ASSAY OF PHOSPHORUS: CPT

## 2019-08-17 PROCEDURE — 97530 THERAPEUTIC ACTIVITIES: CPT

## 2019-08-17 PROCEDURE — 97162 PT EVAL MOD COMPLEX 30 MIN: CPT

## 2019-08-17 PROCEDURE — 36415 COLL VENOUS BLD VENIPUNCTURE: CPT

## 2019-08-17 RX ORDER — INSULIN GLARGINE 100 [IU]/ML
10 INJECTION, SOLUTION SUBCUTANEOUS ONCE
Status: COMPLETED | OUTPATIENT
Start: 2019-08-17 | End: 2019-08-17

## 2019-08-17 RX ORDER — INSULIN GLARGINE 100 [IU]/ML
35 INJECTION, SOLUTION SUBCUTANEOUS DAILY
Status: DISCONTINUED | OUTPATIENT
Start: 2019-08-18 | End: 2019-08-18

## 2019-08-17 RX ORDER — KETOROLAC TROMETHAMINE 15 MG/ML
15 INJECTION, SOLUTION INTRAMUSCULAR; INTRAVENOUS
Status: DISCONTINUED | OUTPATIENT
Start: 2019-08-17 | End: 2019-08-20

## 2019-08-17 RX ADMIN — ONDANSETRON 6 MG: 2 SOLUTION INTRAMUSCULAR; INTRAVENOUS at 23:34

## 2019-08-17 RX ADMIN — DIPHENHYDRAMINE HYDROCHLORIDE 25 MG: 50 INJECTION, SOLUTION INTRAMUSCULAR; INTRAVENOUS at 09:58

## 2019-08-17 RX ADMIN — ONDANSETRON 6 MG: 2 SOLUTION INTRAMUSCULAR; INTRAVENOUS at 15:55

## 2019-08-17 RX ADMIN — INSULIN GLARGINE 10 UNITS: 100 INJECTION, SOLUTION SUBCUTANEOUS at 12:00

## 2019-08-17 RX ADMIN — INSULIN LISPRO 9 UNITS: 100 INJECTION, SOLUTION INTRAVENOUS; SUBCUTANEOUS at 00:00

## 2019-08-17 RX ADMIN — HYDROMORPHONE HYDROCHLORIDE 0.5 MG: 1 INJECTION, SOLUTION INTRAMUSCULAR; INTRAVENOUS; SUBCUTANEOUS at 08:29

## 2019-08-17 RX ADMIN — DIPHENHYDRAMINE HYDROCHLORIDE 25 MG: 50 INJECTION, SOLUTION INTRAMUSCULAR; INTRAVENOUS at 03:15

## 2019-08-17 RX ADMIN — DIPHENHYDRAMINE HYDROCHLORIDE 25 MG: 50 INJECTION, SOLUTION INTRAMUSCULAR; INTRAVENOUS at 22:47

## 2019-08-17 RX ADMIN — INSULIN LISPRO 6 UNITS: 100 INJECTION, SOLUTION INTRAVENOUS; SUBCUTANEOUS at 23:37

## 2019-08-17 RX ADMIN — PIPERACILLIN SODIUM,TAZOBACTAM SODIUM 3.38 G: 3; .375 INJECTION, POWDER, FOR SOLUTION INTRAVENOUS at 15:55

## 2019-08-17 RX ADMIN — HYDROMORPHONE HYDROCHLORIDE 0.5 MG: 1 INJECTION, SOLUTION INTRAMUSCULAR; INTRAVENOUS; SUBCUTANEOUS at 06:30

## 2019-08-17 RX ADMIN — INSULIN LISPRO 9 UNITS: 100 INJECTION, SOLUTION INTRAVENOUS; SUBCUTANEOUS at 13:15

## 2019-08-17 RX ADMIN — INSULIN LISPRO 9 UNITS: 100 INJECTION, SOLUTION INTRAVENOUS; SUBCUTANEOUS at 06:30

## 2019-08-17 RX ADMIN — PIPERACILLIN SODIUM,TAZOBACTAM SODIUM 3.38 G: 3; .375 INJECTION, POWDER, FOR SOLUTION INTRAVENOUS at 05:45

## 2019-08-17 RX ADMIN — DIPHENHYDRAMINE HYDROCHLORIDE 25 MG: 50 INJECTION, SOLUTION INTRAMUSCULAR; INTRAVENOUS at 15:55

## 2019-08-17 RX ADMIN — HYDROMORPHONE HYDROCHLORIDE 0.5 MG: 1 INJECTION, SOLUTION INTRAMUSCULAR; INTRAVENOUS; SUBCUTANEOUS at 01:00

## 2019-08-17 RX ADMIN — KETOROLAC TROMETHAMINE 15 MG: 15 INJECTION, SOLUTION INTRAMUSCULAR; INTRAVENOUS at 09:58

## 2019-08-17 RX ADMIN — KETOROLAC TROMETHAMINE 15 MG: 15 INJECTION, SOLUTION INTRAMUSCULAR; INTRAVENOUS at 22:49

## 2019-08-17 RX ADMIN — ONDANSETRON 6 MG: 2 SOLUTION INTRAMUSCULAR; INTRAVENOUS at 01:00

## 2019-08-17 RX ADMIN — PIPERACILLIN SODIUM,TAZOBACTAM SODIUM 3.38 G: 3; .375 INJECTION, POWDER, FOR SOLUTION INTRAVENOUS at 22:47

## 2019-08-17 RX ADMIN — INSULIN GLARGINE 25 UNITS: 100 INJECTION, SOLUTION SUBCUTANEOUS at 09:00

## 2019-08-17 RX ADMIN — ONDANSETRON 6 MG: 2 SOLUTION INTRAMUSCULAR; INTRAVENOUS at 06:30

## 2019-08-17 RX ADMIN — POTASSIUM CHLORIDE: 2 INJECTION, SOLUTION, CONCENTRATE INTRAVENOUS at 20:55

## 2019-08-17 RX ADMIN — KETOROLAC TROMETHAMINE 15 MG: 15 INJECTION, SOLUTION INTRAMUSCULAR; INTRAVENOUS at 15:55

## 2019-08-17 NOTE — PROGRESS NOTES
Problem: Diabetes Self-Management  Goal: *Disease process and treatment process  Description  Define diabetes and identify own type of diabetes; list 3 options for treating diabetes. Outcome: Progressing Towards Goal  Goal: *Incorporating nutritional management into lifestyle  Description  Describe effect of type, amount and timing of food on blood glucose; list 3 methods for planning meals. Outcome: Progressing Towards Goal  Goal: *Incorporating physical activity into lifestyle  Description  State effect of exercise on blood glucose levels. Outcome: Progressing Towards Goal  Goal: *Developing strategies to promote health/change behavior  Description  Define the ABC's of diabetes; identify appropriate screenings, schedule and personal plan for screenings. Outcome: Progressing Towards Goal  Goal: *Using medications safely  Description  State effect of diabetes medications on diabetes; name diabetes medication taking, action and side effects. Outcome: Progressing Towards Goal  Goal: *Monitoring blood glucose, interpreting and using results  Description  Identify recommended blood glucose targets  and personal targets. Outcome: Progressing Towards Goal  Goal: *Prevention, detection, treatment of acute complications  Description  List symptoms of hyper- and hypoglycemia; describe how to treat low blood sugar and actions for lowering  high blood glucose level. Outcome: Progressing Towards Goal  Goal: *Prevention, detection and treatment of chronic complications  Description  Define the natural course of diabetes and describe the relationship of blood glucose levels to long term complications of diabetes.   Outcome: Progressing Towards Goal  Goal: *Developing strategies to address psychosocial issues  Description  Describe feelings about living with diabetes; identify support needed and support network  Outcome: Progressing Towards Goal  Goal: *Sick day guidelines  Outcome: Progressing Towards Goal  Goal: *Patient Specific Goal (EDIT GOAL, INSERT TEXT)  Outcome: Progressing Towards Goal     Problem: Patient Education: Go to Patient Education Activity  Goal: Patient/Family Education  Outcome: Progressing Towards Goal     Problem: Falls - Risk of  Goal: *Absence of Falls  Description  Document Valentin Brambila Fall Risk and appropriate interventions in the flowsheet. Outcome: Progressing Towards Goal  Note:   Fall Risk Interventions:  Mobility Interventions: Assess mobility with egress test, Strengthening exercises (ROM-active/passive), Bed/chair exit alarm    Mentation Interventions: Bed/chair exit alarm, Door open when patient unattended, Familiar objects from home, Eyeglasses and hearing aids, Reorient patient, Increase mobility, Toileting rounds, Update white board    Medication Interventions: Evaluate medications/consider consulting pharmacy    Elimination Interventions: Call light in reach, Toileting schedule/hourly rounds, Toilet paper/wipes in reach              Problem: Patient Education: Go to Patient Education Activity  Goal: Patient/Family Education  Outcome: Progressing Towards Goal     Problem: Discharge Planning  Goal: *Discharge to safe environment  Outcome: Progressing Towards Goal     Problem: Pressure Injury - Risk of  Goal: *Prevention of pressure injury  Description  Document Cristi Scale and appropriate interventions in the flowsheet. Outcome: Progressing Towards Goal  Note:   Pressure Injury Interventions:  Sensory Interventions: Assess changes in LOC, Maintain/enhance activity level, Minimize linen layers, Monitor skin under medical devices, Pad between skin to skin, Pressure redistribution bed/mattress (bed type), Turn and reposition approx.  every two hours (pillows and wedges if needed)    Moisture Interventions: Absorbent underpads, Apply protective barrier, creams and emollients, Internal/External urinary devices, Limit adult briefs, Maintain skin hydration (lotion/cream), Minimize layers, Moisture barrier, Offer toileting Q_hr    Activity Interventions: Pressure redistribution bed/mattress(bed type)    Mobility Interventions: Pressure redistribution bed/mattress (bed type), Turn and reposition approx.  every two hours(pillow and wedges)    Nutrition Interventions: Document food/fluid/supplement intake, Discuss nutritional consult with provider, Offer support with meals,snacks and hydration    Friction and Shear Interventions: Apply protective barrier, creams and emollients, Feet elevated on foot rest, Minimize layers, HOB 30 degrees or less, Transferring/repositioning devices                Problem: Patient Education: Go to Patient Education Activity  Goal: Patient/Family Education  Outcome: Progressing Towards Goal     Problem: Pain  Goal: *Control of Pain  Outcome: Progressing Towards Goal     Problem: Patient Education: Go to Patient Education Activity  Goal: Patient/Family Education  Outcome: Progressing Towards Goal     Problem: Patient Education: Go to Patient Education Activity  Goal: Patient/Family Education  Outcome: Progressing Towards Goal     Problem: Surgical Pathway Post-Op Day 2 through Discharge  Goal: Off Pathway (Use only if patient is Off Pathway)  Outcome: Progressing Towards Goal  Goal: Activity/Safety  Outcome: Progressing Towards Goal  Goal: Nutrition/Diet  Outcome: Progressing Towards Goal  Goal: Discharge Planning  Outcome: Progressing Towards Goal  Goal: Medications  Outcome: Progressing Towards Goal  Goal: Respiratory  Outcome: Progressing Towards Goal  Goal: Treatments/Interventions/Procedures  Outcome: Progressing Towards Goal  Goal: Psychosocial  Outcome: Progressing Towards Goal  Goal: *No signs and symptoms of infection or wound complications  Outcome: Progressing Towards Goal  Goal: *Optimal pain control at patient's stated goal  Outcome: Progressing Towards Goal  Goal: *Adequate urinary output (equal to or greater than 30 milliliters/hour)  Outcome: Progressing Towards Goal  Goal: *Hemodynamically stable  Outcome: Progressing Towards Goal  Goal: *Tolerating diet  Outcome: Progressing Towards Goal  Goal: *Demonstrates progressive activity  Outcome: Progressing Towards Goal  Goal: *Lungs clear or at baseline  Outcome: Progressing Towards Goal     Problem: Surgical Pathway: Discharge Outcomes  Goal: *Hemodynamically stable  Outcome: Progressing Towards Goal  Goal: *Lungs clear or at baseline  Outcome: Progressing Towards Goal  Goal: *Demonstrates independent activity or return to baseline  Outcome: Progressing Towards Goal  Goal: *Optimal pain control at patient's stated goal  Outcome: Progressing Towards Goal  Goal: *Verbalizes understanding and describes prescribed diet  Outcome: Progressing Towards Goal  Goal: *Tolerating diet  Outcome: Progressing Towards Goal  Goal: *Verbalizes name, dosage, time, side effects, and number of days to continue medications  Outcome: Progressing Towards Goal  Goal: *No signs and symptoms of infection or wound complications  Outcome: Progressing Towards Goal  Goal: *Anxiety reduced or absent  Outcome: Progressing Towards Goal  Goal: *Understands and describes signs and symptoms to report to providers(Stroke Metric)  Outcome: Progressing Towards Goal  Goal: *Describes follow-up/return visits to physicians  Outcome: Progressing Towards Goal  Goal: *Describes available resources and support systems  Outcome: Progressing Towards Goal     Problem: Nutrition Deficit  Goal: *Optimize nutritional status  Outcome: Progressing Towards Goal     Problem: Diabetes Maintenance:Admission  Goal: *Blood glucose 80 to 180 mg/dl  Outcome: Progressing Towards Goal     Problem: Nutrition Deficit  Goal: *Optimize nutritional status  Outcome: Progressing Towards Goal

## 2019-08-17 NOTE — PROGRESS NOTES
Problem: Mobility Impaired (Adult and Pediatric)  Goal: *Acute Goals and Plan of Care (Insert Text)  Description  Physical Therapy Goals  Initiated 8/17/2019 and to be accomplished within 7 day(s)  1. Patient will move from supine to sit and sit to supine , scoot up and down and roll side to side in bed with modified independence. 2.  Patient will transfer from bed to chair and chair to bed with modified independence using the least restrictive device. 3.  Patient will perform sit to stand with modified independence. 4.  Patient will ambulate with modified independence for >/= 150 feet with the least restrictive device. 5.  Patient will ascend/descend 3 stairs with handrail(s) with modified independence. Outcome: Progressing Towards Goal     PHYSICAL THERAPY EVALUATION    Patient: Keagan June (71 y.o. female)  Date: 8/17/2019  Primary Diagnosis: SBO (small bowel obstruction) ([facility]) [K56.609]  SBO (small bowel obstruction) ([facility] [[email or web address]ca [facility].) [K56.609]  S/P small bowel resection [Z90.49]  Procedure(s) (LRB):  LAPAROTOMY EXPLORATORY. Extensive lysis of adhesions. Small bowel resection. Drain placement. (N/A) 7 Days Post-Op   Precautions:       PLOF: Per patient, does use straight cane on and off. ASSESSMENT :  Based on the objective data described below, the patient presents with c/o pain, impaired balance, decrease bed mobility, decrease transfer and decrease gait independence. Will benefit from skilled PT intervention to increase overall functional mobility independence and safety post-op. Patient will benefit from skilled intervention to address the above impairments. Patient's rehabilitation potential is considered to be Good  Factors which may influence rehabilitation potential include:   ? None noted  ? Mental ability/status  ? Medical condition  ? Home/family situation and support systems  ? Safety awareness  ? Pain tolerance/management  ? Other:     Recommendations for nursing:  Written on communication board: OOB with assist of 1  Verbally communicated to: nurse Payton Jain     PLAN :  Recommendations and Planned Interventions:   ?           Bed Mobility Training             ? Neuromuscular Re-Education  ? Transfer Training                   ? Orthotic/Prosthetic Training  ? Gait Training                          ? Modalities  ? Therapeutic Exercises           ? Edema Management/Control  ? Therapeutic Activities            ? Family Training/Education  ? Patient Education  ? Other (comment):Plan of care, safety, bed mobility, transfers and gait with rolling walker    Frequency/Duration: Patient will be followed by physical therapy 1 time per day/3-5 days per week to address goals. Discharge Recommendations: ? Mitul Hidalgo  Further Equipment Recommendations for Discharge: rolling walker     SUBJECTIVE:   Patient stated I need to use bedside commode to pee.   Per patient right hand in cast secondary to bad case of tendinitis.     OBJECTIVE DATA SUMMARY:     Past Medical History:   Diagnosis Date    Abdominal adhesions     Adnexal cyst 7/30/2019    Left    Anemia     Asthma     Chronic pelvic pain in female 7/30/2019    Diabetes mellitus     Dyskinesia     bilateral    Essential hypertension     GERD (gastroesophageal reflux disease)     Hypertension     Menopause     Microhematuria     Rheumatoid arteritis 2018    Stool color black      Past Surgical History:   Procedure Laterality Date    COLONOSCOPY N/A 1/21/2019    COLONOSCOPY performed by Kiah Garvin MD at [facility] ENDOSCOPY    HX CHOLECYSTECTOMY  6/28/10    HX COLONOSCOPY      HX ENDOSCOPY      HX HERNIA REPAIR      HX HYSTERECTOMY  1989    Fibroids    HX KNEE REPLACEMENT Left     HX KNEE REPLACEMENT Right 06/2018    HX OOPHORECTOMY  12/2000    HX ORTHOPAEDIC Right     ankle- multiple surgeries    HX SMALL BOWEL RESECTION  12/2000    HX SMALL BOWEL RESECTION  02/21/2017    Dr. Valdes Samples       Barriers to Learning/Limitations: None  Compensate with: N/A  Home Situation:  Home Situation  Home Environment: Private residence  # Steps to Enter: 3  Rails to Enter: Yes  Hand Rails : Bilateral  One/Two Story Residence: One story  Living Alone: No  Support Systems: Family member(s)  Patient Expects to be Discharged to[de-identified] Private residence  Current DME Used/Available at Home: Bonna Sudhakar, straight, Walker, rolling, Wheelchair  Critical Behavior:  Neurologic State: Alert; Appropriate for age;Eyes open spontaneously  Orientation Level: Oriented X4  Cognition: Follows commands  Safety/Judgement: Awareness of environment  Psychosocial  Patient Behaviors: Calm; Cooperative  Family  Behaviors: Calm;Supportive  Purposeful Interaction: Yes  Pt Identified Daily Priority: Clinical issues (comment)  Caritas Process: Nurture loving kindness;Enable meeta/hope;Establish trust;Teaching/learning; Attend basic human needs  Caring Interventions: Therapeutic modalities  Reassure: Therapeutic listening; Acceptance; Instilling meeta and hope;Support family;Caring rounds  Therapeutic Modalities: Humor; Intentional therapeutic touch  Skin Condition/Temp: Cool  Family  Behaviors: Calm;Supportive  Skin Integrity: Incision (comment)  Skin Integumentary  Skin Color: Appropriate for ethnicity  Skin Condition/Temp: Cool  Skin Integrity: Incision (comment)  Turgor: Non-tenting  Hair Growth: Present  Varicosities: Absent     Strength:    Strength: Generally decreased, functional(both LE)      Tone & Sensation:   Tone: Normal       Range Of Motion:  AROM: Generally decreased, functional(both LE)     Functional Mobility:  Bed Mobility:  Supine to Sit: Minimum assistance; Moderate assistance  Transfers:  Sit to Stand: Minimum assistance; Moderate assistance  Stand to Sit: Minimum assistance; Moderate assistance  Stand Pivot Transfers: Minimum assistance; Moderate assistance(bed to [facility])     Balance:   Sitting: Impaired; With support  Sitting - Static: Fair (occasional)  Sitting - Dynamic: Fair (occasional)(Minus)  Standing: Impaired; With support  Standing - Static: Fair(Minus)  Standing - Dynamic : Poor(Plus/Fair Minus)  Ambulation/Gait Training:  Distance (ft): (4 short steps BSC to chair)  Assistive Device: Walker, rolling  Ambulation - Level of Assistance: Contact guard assistance;Minimal assistance  Gait Abnormalities: Decreased step clearance  Base of Support: Center of gravity altered  Step Length: Left shortened;Right shortened    Pain:  Pain level pre-treatment: 9/10, abdominal area   Pain level post-treatment: 9/10, abdominal area  Pain Intervention(s) : Medication (see MAR); Rest, Ice, Repositioning  Response to intervention: Nurse notified patient wanted something for pain, See doc flow    Activity Tolerance:  Fair Minus  Please refer to the flowsheet for vital signs taken during this treatment. After treatment:   ?         Patient left in no apparent distress sitting up in chair  ? Patient left in no apparent distress in bed  ? Call bell left within reach  ? Nursing notified  ? Caregiver present  ? Bed alarm activated  ? SCDs applied    COMMUNICATION/EDUCATION:   ?         Role of Physical Therapy in the acute care setting. ?         Fall prevention education was provided and the patient/caregiver indicated understanding. ? Patient/family have participated as able in goal setting and plan of care. ?         Patient/family agree to work toward stated goals and plan of care. ?         Patient understands intent and goals of therapy, but is neutral about his/her participation. ? Patient is unable to participate in goal setting/plan of care: ongoing with therapy staff. ?         Other:     Thank you for this referral.  Odessa Davis, PT   Time Calculation: 39 mins      Brandon Complexity: History: MEDIUM  Complexity : 1-2 comorbidities / personal factors will impact the outcome/ POC Exam:MEDIUM Complexity : 3 Standardized tests and measures addressing body structure, function, activity limitation and / or participation in recreation  Presentation: MEDIUM Complexity : Evolving with changing characteristics  Clinical Decision Making:Medium Complexity    Overall Complexity:MEDIUM

## 2019-08-17 NOTE — ROUTINE PROCESS
Assumed care of patient at [facility] report received from Nathan Singh with SBAR, Intake & Output. Pt awake, alert and oriented X 3. Right nare NGT intact, changed canister & tubing. Pt still c/o of nausea. Mid abdomen with staples open to air. Right side with 2 BOBBY drains intact. No verbal distress noted. Bed in lowest position & wheels locked. Call bell within reach. 2230 - Patient has been out of bed using BSC, urinating. C/o nausea but no vomit. 2249 -  Toradol IV prn administered for pain. 2334 -  Zofran IV prn administered for nausea & coughing phlegm. 8/18/19    0500 -  Patient has been out of bed to [facility] to urinate several times. 1732 -  Bedside and Verbal shift change report given to Sushma Smith RN (oncoming nurse) by Arleen Luna RN BSN ( off going Nurse ) inclusive of SBAR and plan of care, Intake & Output.

## 2019-08-17 NOTE — PROGRESS NOTES
0730 Bedside shift change report given to Rob Aquino (oncoming nurse) by Olamide Arellano (offgoing nurse). Report included the following information SBAR, Kardex, Intake/Output and MAR     0950 PT at bedside, pt ambulated and is sitting in recliner. Family at bedside. 1115 Pt ambulated to bedside commode and is voiding. Pt returned to co    1627 NG cannister replaced    1855 Pt has had N/V for the past hour despite Zofran. Pt also complains of frequent itching despite Benadryl administration. Pt is very uncomfortable, repositioned for comfort. Pt has had 900 mL NG output on low continuous suction per 7am-7pm shift. Dr. Otoniel Montenegro notified of pt's emesis and NG output. Orders placed for KUB to verify NG placement    1930 Bedside and Verbal shift change report given to Chaya (oncoming nurse) by Rob Aquino (offgoing nurse). Report included the following information SBAR, Kardex, Intake/Output and MAR.

## 2019-08-17 NOTE — PROGRESS NOTES
Problem: Diabetes Self-Management  Goal: *Disease process and treatment process  Description  Define diabetes and identify own type of diabetes; list 3 options for treating diabetes. Outcome: Progressing Towards Goal  Goal: *Incorporating nutritional management into lifestyle  Description  Describe effect of type, amount and timing of food on blood glucose; list 3 methods for planning meals. Outcome: Progressing Towards Goal  Goal: *Incorporating physical activity into lifestyle  Description  State effect of exercise on blood glucose levels. Outcome: Progressing Towards Goal  Goal: *Developing strategies to promote health/change behavior  Description  Define the ABC's of diabetes; identify appropriate screenings, schedule and personal plan for screenings. Outcome: Progressing Towards Goal  Goal: *Using medications safely  Description  State effect of diabetes medications on diabetes; name diabetes medication taking, action and side effects. Outcome: Progressing Towards Goal  Goal: *Monitoring blood glucose, interpreting and using results  Description  Identify recommended blood glucose targets  and personal targets. Outcome: Progressing Towards Goal  Goal: *Prevention, detection, treatment of acute complications  Description  List symptoms of hyper- and hypoglycemia; describe how to treat low blood sugar and actions for lowering  high blood glucose level. Outcome: Progressing Towards Goal  Goal: *Prevention, detection and treatment of chronic complications  Description  Define the natural course of diabetes and describe the relationship of blood glucose levels to long term complications of diabetes.   Outcome: Progressing Towards Goal  Goal: *Developing strategies to address psychosocial issues  Description  Describe feelings about living with diabetes; identify support needed and support network  Outcome: Progressing Towards Goal  Goal: *Sick day guidelines  Outcome: Progressing Towards Goal  Goal: *Patient Specific Goal (EDIT GOAL, INSERT TEXT)  Outcome: Progressing Towards Goal     Problem: Diabetes Self-Management  Goal: *Disease process and treatment process  Description  Define diabetes and identify own type of diabetes; list 3 options for treating diabetes. Outcome: Progressing Towards Goal     Problem: Diabetes Self-Management  Goal: *Incorporating nutritional management into lifestyle  Description  Describe effect of type, amount and timing of food on blood glucose; list 3 methods for planning meals. Outcome: Progressing Towards Goal     Problem: Diabetes Self-Management  Goal: *Incorporating physical activity into lifestyle  Description  State effect of exercise on blood glucose levels. Outcome: Progressing Towards Goal     Problem: Diabetes Self-Management  Goal: *Using medications safely  Description  State effect of diabetes medications on diabetes; name diabetes medication taking, action and side effects.   Outcome: Progressing Towards Goal     Problem: Diabetes Self-Management  Goal: *Developing strategies to address psychosocial issues  Description  Describe feelings about living with diabetes; identify support needed and support network  Outcome: Progressing Towards Goal

## 2019-08-17 NOTE — PROGRESS NOTES
Bedside shift change report was given to me, Brian Mercer RN  ( ICU float night shift nurse), by Jeff Mendoza RN  ( 2 Central day shift nurses). Report included the following information:  SBAR, kardex, consultations, hemodynamic monitoring, intake/output, MAR, lab results, VS trends, cardiac rhythm noted NSR/ST. Skin, neuro, respiratory status, order verification, and critical IV gtt rate verification has been dually noted and verified at the bedside with:  Jeff Mendoza RN                                    2 Central Nurse's Note:    2000: Pt is awake, alert x 4. Pt independently moves all extremities with generalized weakness, requires assist with all ADLs and is adept at self repositioning. Bed is locked and in lowest position, side rails up x 2, call bell and bedside table are within reach. Interventions are ongoing, will continue to monitor. Neurological: as noted in assessment,   Cardiovascular: non-tele  Pulmonary: breath sounds coarse, diminished, saturations maintained at 100% on 2 LPM via NC. ICS at the bedside, pt instructed on appropriate use and return demonstration. GI/: Abdomen is semi soft, distended with hypoactive bowel sounds, 18 postop staples intact to midline abdominal incision, well approximated with and  Intact. NG tube intact to right nare and connected to LCS with green drainage noted. BOBBY drain #1 draining green, bilious drainage. BOBBY 3 @ draining sanguinous drainage as documented. Pt remains NPO except for ice chips and po meds. Pt is tolerating ice chips at this time without incident. Last BM noted prior to admission on 08/09/2019. Purewick external catheter is showing adequate urine output. IVF/Critical gtts: IV maintenance fluid, IV abx, TPN @ 100 ml/hr  Skin: as noted above    2115: Pt requested and was medicated at this time with IV Dilaudid for abdominal  pain rated 8/10. Pt denies further c/ at this time and is in no acute distress.    2200: Pt tolerated his regularly scheduled HS meds without incident or complaint. 2300: New bag of TPN initiated and infusing without incident. 0000: HS accucheck noted 278, pt was given 9 units of Humulog SSI per orders. 0100: Pt requested and was medicated at this time with IV Dilaudid for abdominal  pain rated 8/10. Pt denies further c/ at this time and is in no acute distress. 0400: Scheduled AM labs drawn fro double lumen PICC, all lines patent and flushed with positive blood return noted. 0600: Pt tolerated his regularly scheduled AM meds without incident or complaint. AM accucheck noted 271, pt was given 9 units of Humulog SSI per orders. She also had a small formed bowel movement on the bedpan. Pt states relief of abd discomfort and pressure.    0710: Bedside change of shift report given to: Kellee Ferreira RN

## 2019-08-17 NOTE — PROGRESS NOTES
Patient seen and examined. She is doing better. No fever or tachycardia. WBC is better. IR placed a drain in a hematoma. No succus. Minimal output after placement. ABdomen is soft and non-tender. NG output about 400 cc  BOBBY about 370 over 24 hours    Plan:  NPO  TPN  Ambulation  Stop narcotics  IV antibiotics  Possible repeat CT scan on Monday to rule out any new collections and follow on old ones  Discharge planning with TPN to a rehab place.

## 2019-08-18 LAB
ANION GAP SERPL CALC-SCNC: 4 MMOL/L (ref 3–18)
BASOPHILS # BLD: 0 K/UL (ref 0–0.1)
BASOPHILS NFR BLD: 0 % (ref 0–2)
BUN SERPL-MCNC: 19 MG/DL (ref 7–18)
BUN/CREAT SERPL: 20 (ref 12–20)
CALCIUM SERPL-MCNC: 8.7 MG/DL (ref 8.5–10.1)
CHLORIDE SERPL-SCNC: 99 MMOL/L (ref 100–111)
CO2 SERPL-SCNC: 31 MMOL/L (ref 21–32)
CREAT SERPL-MCNC: 0.94 MG/DL (ref 0.6–1.3)
DIFFERENTIAL METHOD BLD: ABNORMAL
EOSINOPHIL # BLD: 0.2 K/UL (ref 0–0.4)
EOSINOPHIL NFR BLD: 2 % (ref 0–5)
ERYTHROCYTE [DISTWIDTH] IN BLOOD BY AUTOMATED COUNT: 16.9 % (ref 11.6–14.5)
GLUCOSE BLD STRIP.AUTO-MCNC: 231 MG/DL (ref 70–110)
GLUCOSE BLD STRIP.AUTO-MCNC: 268 MG/DL (ref 70–110)
GLUCOSE BLD STRIP.AUTO-MCNC: 282 MG/DL (ref 70–110)
GLUCOSE SERPL-MCNC: 197 MG/DL (ref 74–99)
HCT VFR BLD AUTO: 24.2 % (ref 35–45)
HGB BLD-MCNC: 7.7 G/DL (ref 12–16)
LYMPHOCYTES # BLD: 1.2 K/UL (ref 0.9–3.6)
LYMPHOCYTES NFR BLD: 12 % (ref 21–52)
MCH RBC QN AUTO: 27.9 PG (ref 24–34)
MCHC RBC AUTO-ENTMCNC: 31.8 G/DL (ref 31–37)
MCV RBC AUTO: 87.7 FL (ref 74–97)
MONOCYTES # BLD: 1.8 K/UL (ref 0.05–1.2)
MONOCYTES NFR BLD: 19 % (ref 3–10)
NEUTS SEG # BLD: 6.3 K/UL (ref 1.8–8)
NEUTS SEG NFR BLD: 67 % (ref 40–73)
PLATELET # BLD AUTO: 677 K/UL (ref 135–420)
PMV BLD AUTO: 10.1 FL (ref 9.2–11.8)
POTASSIUM SERPL-SCNC: 4.4 MMOL/L (ref 3.5–5.5)
RBC # BLD AUTO: 2.76 M/UL (ref 4.2–5.3)
SODIUM SERPL-SCNC: 134 MMOL/L (ref 136–145)
WBC # BLD AUTO: 9.5 K/UL (ref 4.6–13.2)

## 2019-08-18 PROCEDURE — 74011000258 HC RX REV CODE- 258: Performed by: SURGERY

## 2019-08-18 PROCEDURE — 36415 COLL VENOUS BLD VENIPUNCTURE: CPT

## 2019-08-18 PROCEDURE — 74011250636 HC RX REV CODE- 250/636: Performed by: SURGERY

## 2019-08-18 PROCEDURE — 85025 COMPLETE CBC W/AUTO DIFF WBC: CPT

## 2019-08-18 PROCEDURE — 82962 GLUCOSE BLOOD TEST: CPT

## 2019-08-18 PROCEDURE — 74011000250 HC RX REV CODE- 250: Performed by: SURGERY

## 2019-08-18 PROCEDURE — 80048 BASIC METABOLIC PNL TOTAL CA: CPT

## 2019-08-18 PROCEDURE — 65270000029 HC RM PRIVATE

## 2019-08-18 PROCEDURE — 74011636637 HC RX REV CODE- 636/637: Performed by: SURGERY

## 2019-08-18 PROCEDURE — 74011250636 HC RX REV CODE- 250/636: Performed by: NURSE PRACTITIONER

## 2019-08-18 RX ORDER — INSULIN GLARGINE 100 [IU]/ML
8 INJECTION, SOLUTION SUBCUTANEOUS ONCE
Status: COMPLETED | OUTPATIENT
Start: 2019-08-18 | End: 2019-08-18

## 2019-08-18 RX ORDER — INSULIN GLARGINE 100 [IU]/ML
45 INJECTION, SOLUTION SUBCUTANEOUS DAILY
Status: DISCONTINUED | OUTPATIENT
Start: 2019-08-19 | End: 2019-08-20

## 2019-08-18 RX ORDER — MORPHINE SULFATE 2 MG/ML
1 INJECTION, SOLUTION INTRAMUSCULAR; INTRAVENOUS ONCE
Status: COMPLETED | OUTPATIENT
Start: 2019-08-18 | End: 2019-08-18

## 2019-08-18 RX ADMIN — INSULIN GLARGINE 8 UNITS: 100 INJECTION, SOLUTION SUBCUTANEOUS at 13:04

## 2019-08-18 RX ADMIN — PIPERACILLIN SODIUM,TAZOBACTAM SODIUM 3.38 G: 3; .375 INJECTION, POWDER, FOR SOLUTION INTRAVENOUS at 05:56

## 2019-08-18 RX ADMIN — INSULIN LISPRO 9 UNITS: 100 INJECTION, SOLUTION INTRAVENOUS; SUBCUTANEOUS at 13:16

## 2019-08-18 RX ADMIN — INSULIN GLARGINE 35 UNITS: 100 INJECTION, SOLUTION SUBCUTANEOUS at 08:56

## 2019-08-18 RX ADMIN — ONDANSETRON 6 MG: 2 SOLUTION INTRAMUSCULAR; INTRAVENOUS at 21:58

## 2019-08-18 RX ADMIN — PIPERACILLIN SODIUM,TAZOBACTAM SODIUM 3.38 G: 3; .375 INJECTION, POWDER, FOR SOLUTION INTRAVENOUS at 14:07

## 2019-08-18 RX ADMIN — KETOROLAC TROMETHAMINE 15 MG: 15 INJECTION, SOLUTION INTRAMUSCULAR; INTRAVENOUS at 08:52

## 2019-08-18 RX ADMIN — PIPERACILLIN SODIUM,TAZOBACTAM SODIUM 3.38 G: 3; .375 INJECTION, POWDER, FOR SOLUTION INTRAVENOUS at 21:58

## 2019-08-18 RX ADMIN — MORPHINE SULFATE 1 MG: 2 INJECTION, SOLUTION INTRAMUSCULAR; INTRAVENOUS at 21:59

## 2019-08-18 RX ADMIN — INSULIN LISPRO 6 UNITS: 100 INJECTION, SOLUTION INTRAVENOUS; SUBCUTANEOUS at 06:05

## 2019-08-18 RX ADMIN — KETOROLAC TROMETHAMINE 15 MG: 15 INJECTION, SOLUTION INTRAMUSCULAR; INTRAVENOUS at 17:05

## 2019-08-18 RX ADMIN — DIPHENHYDRAMINE HYDROCHLORIDE 25 MG: 50 INJECTION, SOLUTION INTRAMUSCULAR; INTRAVENOUS at 19:38

## 2019-08-18 RX ADMIN — CALCIUM GLUCONATE: 94 INJECTION, SOLUTION INTRAVENOUS at 20:45

## 2019-08-18 RX ADMIN — INSULIN LISPRO 9 UNITS: 100 INJECTION, SOLUTION INTRAVENOUS; SUBCUTANEOUS at 18:00

## 2019-08-18 RX ADMIN — DIPHENHYDRAMINE HYDROCHLORIDE 25 MG: 50 INJECTION, SOLUTION INTRAMUSCULAR; INTRAVENOUS at 12:18

## 2019-08-18 NOTE — PROGRESS NOTES
Problem: Diabetes Self-Management  Goal: *Disease process and treatment process  Description  Define diabetes and identify own type of diabetes; list 3 options for treating diabetes. Outcome: Progressing Towards Goal     Problem: Falls - Risk of  Goal: *Absence of Falls  Description  Document Zayda Robles Fall Risk and appropriate interventions in the flowsheet. Outcome: Progressing Towards Goal  Note:   Fall Risk Interventions:  Mobility Interventions: Communicate number of staff needed for ambulation/transfer, OT consult for ADLs, Patient to call before getting OOB, PT Consult for assist device competence, Utilize walker, cane, or other assistive device    Mentation Interventions: Adequate sleep, hydration, pain control, Evaluate medications/consider consulting pharmacy, More frequent rounding, Increase mobility, Toileting rounds, Update white board    Medication Interventions: Assess postural VS orthostatic hypotension, Evaluate medications/consider consulting pharmacy, Patient to call before getting OOB, Teach patient to arise slowly    Elimination Interventions: Call light in reach, Elevated toilet seat, Patient to call for help with toileting needs, Stay With Me (per policy), Toilet paper/wipes in reach, Toileting schedule/hourly rounds              Problem: Patient Education: Go to Patient Education Activity  Goal: Patient/Family Education  Outcome: Progressing Towards Goal     Problem: Discharge Planning  Goal: *Discharge to safe environment  Outcome: Progressing Towards Goal     Problem: Pressure Injury - Risk of  Goal: *Prevention of pressure injury  Description  Document Cristi Scale and appropriate interventions in the flowsheet.   Outcome: Progressing Towards Goal  Note:   Pressure Injury Interventions:  Sensory Interventions: Assess changes in LOC, Check visual cues for pain, Maintain/enhance activity level, Monitor skin under medical devices, Pressure redistribution bed/mattress (bed type), Turn and reposition approx. every two hours (pillows and wedges if needed)    Moisture Interventions: Absorbent underpads, Maintain skin hydration (lotion/cream), Offer toileting Q_hr    Activity Interventions: Increase time out of bed, Pressure redistribution bed/mattress(bed type), PT/OT evaluation    Mobility Interventions: HOB 30 degrees or less, Pressure redistribution bed/mattress (bed type), PT/OT evaluation, Turn and reposition approx.  every two hours(pillow and wedges)    Nutrition Interventions: Document food/fluid/supplement intake    Friction and Shear Interventions: Foam dressings/transparent film/skin sealants, Lift team/patient mobility team, Transfer aides:transfer board/[employer] lift/ceiling lift, Transferring/repositioning devices                Problem: Pain  Goal: *Control of Pain  Outcome: Progressing Towards Goal     Problem: Surgical Pathway Post-Op Day 2 through Discharge  Goal: Activity/Safety  Outcome: Progressing Towards Goal

## 2019-08-18 NOTE — PROGRESS NOTES
3151  Medicated withn Toradol for pain, assisted to the  Bedside ayleen, NGT at 62 level, no nausea. Voided. 1212  Not time for Toradol but given Benadryl to  Relax her. 1500  Self suctioning orally, no SOB but at times she  Is coughing  Thin  Clear almost saliva, no  Nausea. 1700  Been up several times to void with assist and sit on the recliner for 2 hours    1900  BOBBY that is draining greenish is more and BOBBY not much drainage with serous flush with 10 cc no resistance. Given Benadryl to relax her.

## 2019-08-18 NOTE — PROGRESS NOTES
She is doing well. Passing flatus. No fever. Mild tachycardia. NG tube is very high output. BOBBY drain from fistula is slightly less. Abdomen is soft and non-tender.     Plan:  NPO  NG tube  TPN  Possible repeat CT scan tomorrow  Discharge planining to rehab next week

## 2019-08-19 ENCOUNTER — APPOINTMENT (OUTPATIENT)
Dept: CT IMAGING | Age: 59
DRG: 330 | End: 2019-08-19
Attending: SURGERY
Payer: COMMERCIAL

## 2019-08-19 LAB
ANION GAP SERPL CALC-SCNC: 7 MMOL/L (ref 3–18)
BASOPHILS # BLD: 0 K/UL (ref 0–0.1)
BASOPHILS NFR BLD: 0 % (ref 0–2)
BUN SERPL-MCNC: 21 MG/DL (ref 7–18)
BUN/CREAT SERPL: 22 (ref 12–20)
CALCIUM SERPL-MCNC: 8.3 MG/DL (ref 8.5–10.1)
CHLORIDE SERPL-SCNC: 99 MMOL/L (ref 100–111)
CO2 SERPL-SCNC: 29 MMOL/L (ref 21–32)
CREAT SERPL-MCNC: 0.96 MG/DL (ref 0.6–1.3)
DIFFERENTIAL METHOD BLD: ABNORMAL
EOSINOPHIL # BLD: 0.3 K/UL (ref 0–0.4)
EOSINOPHIL NFR BLD: 3 % (ref 0–5)
ERYTHROCYTE [DISTWIDTH] IN BLOOD BY AUTOMATED COUNT: 16.7 % (ref 11.6–14.5)
GLUCOSE BLD STRIP.AUTO-MCNC: 175 MG/DL (ref 70–110)
GLUCOSE BLD STRIP.AUTO-MCNC: 201 MG/DL (ref 70–110)
GLUCOSE BLD STRIP.AUTO-MCNC: 247 MG/DL (ref 70–110)
GLUCOSE BLD STRIP.AUTO-MCNC: 252 MG/DL (ref 70–110)
GLUCOSE SERPL-MCNC: 183 MG/DL (ref 74–99)
HCT VFR BLD AUTO: 24 % (ref 35–45)
HGB BLD-MCNC: 7.6 G/DL (ref 12–16)
LYMPHOCYTES # BLD: 1.6 K/UL (ref 0.9–3.6)
LYMPHOCYTES NFR BLD: 18 % (ref 21–52)
MAGNESIUM SERPL-MCNC: 2.3 MG/DL (ref 1.6–2.6)
MCH RBC QN AUTO: 27.3 PG (ref 24–34)
MCHC RBC AUTO-ENTMCNC: 31.7 G/DL (ref 31–37)
MCV RBC AUTO: 86.3 FL (ref 74–97)
MONOCYTES # BLD: 1.6 K/UL (ref 0.05–1.2)
MONOCYTES NFR BLD: 17 % (ref 3–10)
NEUTS SEG # BLD: 5.5 K/UL (ref 1.8–8)
NEUTS SEG NFR BLD: 62 % (ref 40–73)
PHOSPHATE SERPL-MCNC: 3.5 MG/DL (ref 2.5–4.9)
PLATELET # BLD AUTO: 667 K/UL (ref 135–420)
PMV BLD AUTO: 9.9 FL (ref 9.2–11.8)
POTASSIUM SERPL-SCNC: 4.3 MMOL/L (ref 3.5–5.5)
PREALB SERPL-MCNC: 18.6 MG/DL (ref 20–40)
RBC # BLD AUTO: 2.78 M/UL (ref 4.2–5.3)
SODIUM SERPL-SCNC: 135 MMOL/L (ref 136–145)
TRIGL SERPL-MCNC: 230 MG/DL (ref ?–150)
WBC # BLD AUTO: 9 K/UL (ref 4.6–13.2)

## 2019-08-19 PROCEDURE — 74011000250 HC RX REV CODE- 250: Performed by: SURGERY

## 2019-08-19 PROCEDURE — 97535 SELF CARE MNGMENT TRAINING: CPT

## 2019-08-19 PROCEDURE — 84134 ASSAY OF PREALBUMIN: CPT

## 2019-08-19 PROCEDURE — 84100 ASSAY OF PHOSPHORUS: CPT

## 2019-08-19 PROCEDURE — 74011636637 HC RX REV CODE- 636/637: Performed by: SURGERY

## 2019-08-19 PROCEDURE — 85025 COMPLETE CBC W/AUTO DIFF WBC: CPT

## 2019-08-19 PROCEDURE — 80048 BASIC METABOLIC PNL TOTAL CA: CPT

## 2019-08-19 PROCEDURE — 84478 ASSAY OF TRIGLYCERIDES: CPT

## 2019-08-19 PROCEDURE — 97116 GAIT TRAINING THERAPY: CPT

## 2019-08-19 PROCEDURE — 74011250636 HC RX REV CODE- 250/636: Performed by: SURGERY

## 2019-08-19 PROCEDURE — 74011250636 HC RX REV CODE- 250/636: Performed by: NURSE PRACTITIONER

## 2019-08-19 PROCEDURE — 97165 OT EVAL LOW COMPLEX 30 MIN: CPT

## 2019-08-19 PROCEDURE — 74011000258 HC RX REV CODE- 258: Performed by: SURGERY

## 2019-08-19 PROCEDURE — 74176 CT ABD & PELVIS W/O CONTRAST: CPT

## 2019-08-19 PROCEDURE — 83735 ASSAY OF MAGNESIUM: CPT

## 2019-08-19 PROCEDURE — 65270000029 HC RM PRIVATE

## 2019-08-19 PROCEDURE — 82962 GLUCOSE BLOOD TEST: CPT

## 2019-08-19 RX ORDER — MORPHINE SULFATE 2 MG/ML
1 INJECTION, SOLUTION INTRAMUSCULAR; INTRAVENOUS ONCE
Status: COMPLETED | OUTPATIENT
Start: 2019-08-19 | End: 2019-08-19

## 2019-08-19 RX ADMIN — ONDANSETRON 6 MG: 2 SOLUTION INTRAMUSCULAR; INTRAVENOUS at 17:45

## 2019-08-19 RX ADMIN — SODIUM CHLORIDE, SODIUM LACTATE, POTASSIUM CHLORIDE, AND CALCIUM CHLORIDE 25 ML/HR: 600; 310; 30; 20 INJECTION, SOLUTION INTRAVENOUS at 09:19

## 2019-08-19 RX ADMIN — KETOROLAC TROMETHAMINE 15 MG: 15 INJECTION, SOLUTION INTRAMUSCULAR; INTRAVENOUS at 17:46

## 2019-08-19 RX ADMIN — PIPERACILLIN SODIUM,TAZOBACTAM SODIUM 3.38 G: 3; .375 INJECTION, POWDER, FOR SOLUTION INTRAVENOUS at 22:21

## 2019-08-19 RX ADMIN — INSULIN LISPRO 3 UNITS: 100 INJECTION, SOLUTION INTRAVENOUS; SUBCUTANEOUS at 12:04

## 2019-08-19 RX ADMIN — KETOROLAC TROMETHAMINE 15 MG: 15 INJECTION, SOLUTION INTRAMUSCULAR; INTRAVENOUS at 22:39

## 2019-08-19 RX ADMIN — DIPHENHYDRAMINE HYDROCHLORIDE 25 MG: 50 INJECTION, SOLUTION INTRAMUSCULAR; INTRAVENOUS at 06:49

## 2019-08-19 RX ADMIN — KETOROLAC TROMETHAMINE 15 MG: 15 INJECTION, SOLUTION INTRAMUSCULAR; INTRAVENOUS at 00:28

## 2019-08-19 RX ADMIN — PIPERACILLIN SODIUM,TAZOBACTAM SODIUM 3.38 G: 3; .375 INJECTION, POWDER, FOR SOLUTION INTRAVENOUS at 15:03

## 2019-08-19 RX ADMIN — ONDANSETRON 6 MG: 2 SOLUTION INTRAMUSCULAR; INTRAVENOUS at 06:20

## 2019-08-19 RX ADMIN — INSULIN GLARGINE 45 UNITS: 100 INJECTION, SOLUTION SUBCUTANEOUS at 09:00

## 2019-08-19 RX ADMIN — INSULIN LISPRO 9 UNITS: 100 INJECTION, SOLUTION INTRAVENOUS; SUBCUTANEOUS at 00:42

## 2019-08-19 RX ADMIN — INSULIN LISPRO 6 UNITS: 100 INJECTION, SOLUTION INTRAVENOUS; SUBCUTANEOUS at 19:18

## 2019-08-19 RX ADMIN — PIPERACILLIN SODIUM,TAZOBACTAM SODIUM 3.38 G: 3; .375 INJECTION, POWDER, FOR SOLUTION INTRAVENOUS at 06:41

## 2019-08-19 RX ADMIN — DIPHENHYDRAMINE HYDROCHLORIDE 25 MG: 50 INJECTION, SOLUTION INTRAMUSCULAR; INTRAVENOUS at 12:08

## 2019-08-19 RX ADMIN — ONDANSETRON 6 MG: 2 SOLUTION INTRAMUSCULAR; INTRAVENOUS at 12:05

## 2019-08-19 RX ADMIN — INSULIN LISPRO 6 UNITS: 100 INJECTION, SOLUTION INTRAVENOUS; SUBCUTANEOUS at 06:42

## 2019-08-19 RX ADMIN — KETOROLAC TROMETHAMINE 15 MG: 15 INJECTION, SOLUTION INTRAMUSCULAR; INTRAVENOUS at 06:21

## 2019-08-19 RX ADMIN — MORPHINE SULFATE 1 MG: 2 INJECTION, SOLUTION INTRAMUSCULAR; INTRAVENOUS at 15:01

## 2019-08-19 RX ADMIN — CALCIUM GLUCONATE: 94 INJECTION, SOLUTION INTRAVENOUS at 22:29

## 2019-08-19 RX ADMIN — DIPHENHYDRAMINE HYDROCHLORIDE 25 MG: 50 INJECTION, SOLUTION INTRAMUSCULAR; INTRAVENOUS at 17:43

## 2019-08-19 RX ADMIN — KETOROLAC TROMETHAMINE 15 MG: 15 INJECTION, SOLUTION INTRAMUSCULAR; INTRAVENOUS at 12:09

## 2019-08-19 NOTE — PROGRESS NOTES
Patient seen and examined. There was no issues overnight. She took a shower bath and was able to sit on the chair for couple hours. Her NG tube output is very high with about 2 L over the last 24 hours. Her BOBBY drain output from the fistula is about 200 cc. Her drain from the IR drainage is minimal and its only serosanguineous. She has no fever. She has mild tachycardia . Her WBC is normal.  Her creatinine is normal and she is making good urine output. Plan:  Repeat CT scan today to rule out any new large collection  Continue with the antibiotics and the NG tube and TPN  Patient will need placement in the next few days to a rehab place. I spoke with social work team and they are working on placement.

## 2019-08-19 NOTE — PROGRESS NOTES
2007: Assumed pt care from KAMI Scott. Received pt resting in bed, pt is alert and oriented x 4. With NGT connected to low continuous wall suction with green colored output. Pt with BOBBY drains clean, dry and intact. Bed on lowest position, wheels locked, call bell within reach. 2141: patient is complaining of severe abdominal pain, toradol is not yet due unitl 2300. Paged Dr. Carla Darden. 2147: received a call back from Dr. Carla Darden. Notified him that pt is complaining of severe abdominal pain but toradol is not yet due until 11 pm. Received an order to give a one time dose of morphine 1 mg IV. RBV provided. 8-19-19    0745: patient went down for CT of abdomen. 4221: Bedside and Verbal shift change report given to Aj Hannon (oncoming nurse) by Cathie Mckeon   (offgoing nurse). Report included the following information SBAR, Intake/Output, MAR and Recent Results.

## 2019-08-19 NOTE — PROGRESS NOTES
Pt did well with therapy. May qualify for LTAC level of care with ngt to suction, lucas drains, iv antibx tpn. Posted to both ltac and snf choices. Will need ins auth. no accepting facility yet. Updates placed in edc. [facility] left message for srini who does their LTAC admits to review.

## 2019-08-19 NOTE — PROGRESS NOTES
Problem: Diabetes Self-Management  Goal: *Disease process and treatment process  Description  Define diabetes and identify own type of diabetes; list 3 options for treating diabetes. Outcome: Progressing Towards Goal  Goal: *Incorporating nutritional management into lifestyle  Description  Describe effect of type, amount and timing of food on blood glucose; list 3 methods for planning meals. Outcome: Progressing Towards Goal  Goal: *Incorporating physical activity into lifestyle  Description  State effect of exercise on blood glucose levels. Outcome: Progressing Towards Goal  Goal: *Developing strategies to promote health/change behavior  Description  Define the ABC's of diabetes; identify appropriate screenings, schedule and personal plan for screenings. Outcome: Progressing Towards Goal  Goal: *Using medications safely  Description  State effect of diabetes medications on diabetes; name diabetes medication taking, action and side effects. Outcome: Progressing Towards Goal  Goal: *Monitoring blood glucose, interpreting and using results  Description  Identify recommended blood glucose targets  and personal targets. Outcome: Progressing Towards Goal  Goal: *Prevention, detection, treatment of acute complications  Description  List symptoms of hyper- and hypoglycemia; describe how to treat low blood sugar and actions for lowering  high blood glucose level. Outcome: Progressing Towards Goal  Goal: *Prevention, detection and treatment of chronic complications  Description  Define the natural course of diabetes and describe the relationship of blood glucose levels to long term complications of diabetes.   Outcome: Progressing Towards Goal  Goal: *Developing strategies to address psychosocial issues  Description  Describe feelings about living with diabetes; identify support needed and support network  Outcome: Progressing Towards Goal  Goal: *Sick day guidelines  Outcome: Progressing Towards Goal  Goal: *Patient Specific Goal (EDIT GOAL, INSERT TEXT)  Outcome: Progressing Towards Goal     Problem: Patient Education: Go to Patient Education Activity  Goal: Patient/Family Education  Outcome: Progressing Towards Goal     Problem: Falls - Risk of  Goal: *Absence of Falls  Description  Document Belemxuan Hector Fall Risk and appropriate interventions in the flowsheet. Outcome: Progressing Towards Goal  Note:   Fall Risk Interventions:  Mobility Interventions: Patient to call before getting OOB    Mentation Interventions: Adequate sleep, hydration, pain control, Door open when patient unattended, Eyeglasses and hearing aids, Increase mobility, Update white board    Medication Interventions: Assess postural VS orthostatic hypotension, Patient to call before getting OOB, Teach patient to arise slowly    Elimination Interventions: Call light in reach, Patient to call for help with toileting needs, Stay With Me (per policy), Toileting schedule/hourly rounds              Problem: Patient Education: Go to Patient Education Activity  Goal: Patient/Family Education  Outcome: Progressing Towards Goal     Problem: Discharge Planning  Goal: *Discharge to safe environment  Outcome: Progressing Towards Goal     Problem: Pressure Injury - Risk of  Goal: *Prevention of pressure injury  Description  Document Cristi Scale and appropriate interventions in the flowsheet.   Outcome: Progressing Towards Goal  Note:   Pressure Injury Interventions:  Sensory Interventions: Keep linens dry and wrinkle-free, Maintain/enhance activity level, Minimize linen layers    Moisture Interventions: Absorbent underpads, Contain wound drainage, Minimize layers, Offer toileting Q_hr    Activity Interventions: Increase time out of bed    Mobility Interventions: HOB 30 degrees or less    Nutrition Interventions: Document food/fluid/supplement intake, Offer support with meals,snacks and hydration    Friction and Shear Interventions: HOB 30 degrees or less                Problem: Patient Education: Go to Patient Education Activity  Goal: Patient/Family Education  Outcome: Progressing Towards Goal     Problem: Pain  Goal: *Control of Pain  Outcome: Progressing Towards Goal     Problem: Patient Education: Go to Patient Education Activity  Goal: Patient/Family Education  Outcome: Progressing Towards Goal     Problem: Patient Education: Go to Patient Education Activity  Goal: Patient/Family Education  Outcome: Progressing Towards Goal     Problem: Surgical Pathway Post-Op Day 2 through Discharge  Goal: Off Pathway (Use only if patient is Off Pathway)  Outcome: Progressing Towards Goal  Goal: Activity/Safety  Outcome: Progressing Towards Goal  Goal: Nutrition/Diet  Outcome: Progressing Towards Goal  Goal: Discharge Planning  Outcome: Progressing Towards Goal  Goal: Medications  Outcome: Progressing Towards Goal  Goal: Respiratory  Outcome: Progressing Towards Goal  Goal: Treatments/Interventions/Procedures  Outcome: Progressing Towards Goal  Goal: Psychosocial  Outcome: Progressing Towards Goal  Goal: *No signs and symptoms of infection or wound complications  Outcome: Progressing Towards Goal  Goal: *Optimal pain control at patient's stated goal  Outcome: Progressing Towards Goal  Goal: *Adequate urinary output (equal to or greater than 30 milliliters/hour)  Outcome: Progressing Towards Goal  Goal: *Hemodynamically stable  Outcome: Progressing Towards Goal  Goal: *Tolerating diet  Outcome: Progressing Towards Goal  Goal: *Demonstrates progressive activity  Outcome: Progressing Towards Goal  Goal: *Lungs clear or at baseline  Outcome: Progressing Towards Goal     Problem: Surgical Pathway: Discharge Outcomes  Goal: *Hemodynamically stable  Outcome: Progressing Towards Goal  Goal: *Lungs clear or at baseline  Outcome: Progressing Towards Goal  Goal: *Demonstrates independent activity or return to baseline  Outcome: Progressing Towards Goal  Goal: *Optimal pain control at patient's stated goal  Outcome: Progressing Towards Goal  Goal: *Verbalizes understanding and describes prescribed diet  Outcome: Progressing Towards Goal  Goal: *Tolerating diet  Outcome: Progressing Towards Goal  Goal: *Verbalizes name, dosage, time, side effects, and number of days to continue medications  Outcome: Progressing Towards Goal  Goal: *No signs and symptoms of infection or wound complications  Outcome: Progressing Towards Goal  Goal: *Anxiety reduced or absent  Outcome: Progressing Towards Goal  Goal: *Understands and describes signs and symptoms to report to providers(Stroke Metric)  Outcome: Progressing Towards Goal  Goal: *Describes follow-up/return visits to physicians  Outcome: Progressing Towards Goal  Goal: *Describes available resources and support systems  Outcome: Progressing Towards Goal     Problem: Nutrition Deficit  Goal: *Optimize nutritional status  Outcome: Progressing Towards Goal     Problem: Diabetes Maintenance:Admission  Goal: *Blood glucose 80 to 180 mg/dl  Outcome: Progressing Towards Goal     Problem: Nutrition Deficit  Goal: *Optimize nutritional status  Outcome: Progressing Towards Goal     Problem: Patient Education: Go to Patient Education Activity  Goal: Patient/Family Education  Outcome: Progressing Towards Goal

## 2019-08-19 NOTE — PROGRESS NOTES
Problem: Mobility Impaired (Adult and Pediatric)  Goal: *Acute Goals and Plan of Care (Insert Text)  Description  Physical Therapy Goals  Initiated 8/17/2019 and to be accomplished within 7 day(s)  1. Patient will move from supine to sit and sit to supine , scoot up and down and roll side to side in bed with modified independence. 2.  Patient will transfer from bed to chair and chair to bed with modified independence using the least restrictive device. 3.  Patient will perform sit to stand with modified independence. 4.  Patient will ambulate with modified independence for >/= 150 feet with the least restrictive device. 5.  Patient will ascend/descend 3 stairs with handrail(s) with modified independence. Outcome: Progressing Towards Goal   PHYSICAL THERAPY TREATMENT    Patient: Jazz Gil (5 y.o. female)  Date: 8/19/2019  Diagnosis: SBO (small bowel obstruction) ([facility]) [K56.609]  [facility] ([facility] bowel obstruction) [facility] [K56.609]  S/P small bowel resection [Z90.49] SBO (small bowel obstruction) ([facility])  Procedure(s) (LRB):  LAPAROTOMY EXPLORATORY. Extensive lysis of adhesions. Small bowel resection. Drain placement. (N/A) 9 Days Post-Op  Precautions:    PLOF: Independent    ASSESSMENT:  Pt demonstrating much improved activity tolerance this am, supervision for bed mobility, sup>sit>stand transfers, amb to restroom and mod I sit<>stand at low commode. Mod I for gait training X 290 ft with RW, good safety and pacing. Pt supervision for sit>stand to ensure BOBBY drain lines protected. Progression toward goals:   ?      Improving appropriately and progressing toward goals  ? Improving slowly and progressing toward goals  ? Not making progress toward goals and plan of care will be adjusted     PLAN:  Patient continues to benefit from skilled intervention to address the above impairments. Continue treatment per established plan of care.   Discharge Recommendations:  Home Health  Further Equipment Recommendations for Discharge:  rolling walker     SUBJECTIVE:   Patient stated I live with my mom.     OBJECTIVE DATA SUMMARY:   Critical Behavior:  Neurologic State: Alert  Orientation Level: Oriented X4  Cognition: Follows commands  Safety/Judgement: Awareness of environment  Functional Mobility Training:  Bed Mobility:  Supine to Sit: Supervision  Transfers:  Sit to Stand: Supervision  Stand to Sit: Supervision  Balance:  Sitting: Impaired  Sitting - Static: Good (unsupported)  Sitting - Dynamic: Fair (occasional)  Standing: With support  Standing - Static: Good  Standing - Dynamic : Good     Ambulation/Gait Training:  Distance (ft): 290 Feet (ft)  Assistive Device: Walker, rolling  Ambulation - Level of Assistance: Modified independent  Gait Abnormalities: Decreased step clearance  Base of Support: Center of gravity altered    Pain:  Pain level pre-treatment: 6/10  Pain level post-treatment: 6/10   Pain Intervention(s): nurse notified      Activity Tolerance:   Good   Please refer to the flowsheet for vital signs taken during this treatment. After treatment:   ? Patient left in no apparent distress sitting up in chair  ? Patient left in no apparent distress in bed  ? Call bell left within reach  ? Nursing notified  ? Caregiver present  ? Bed alarm activated  ? SCDs applied      COMMUNICATION/EDUCATION:   ?           ? Fall prevention education was provided and the patient/caregiver indicated understanding. ? Patient/family have participated as able in working toward goals and plan of care. ?         Patient/family agree to work toward stated goals and plan of care. ?         Patient understands intent and goals of therapy, but is neutral about his/her participation. ? Patient is unable to participate in stated goals/plan of care: ongoing with therapy staff. ?         Role of Physical Therapy in the acute care setting.         Mumtaz Soriano, ELSA   Time Calculation: 14 mins

## 2019-08-19 NOTE — PROGRESS NOTES
0750 Bedside and Verbal shift change report given to Jacy York (oncoming nurse) by Frances Almanzar (offgoing nurse). Report included the following information SBAR, Kardex, Intake/Output and MAR. Pt out of room at CT.     0830 Pt back on floor, PT at bedside. Pt ambulating hallway    0845 Pt in recliner, hooked back up to suction, NG @ 48 cm    0920 Pt returned to bed. Denies N/V. Pt still in pain    1330 Pt in pain with N/V and emesis    1450 Dr. Lewis Officer notified about pt's continued N/V and pain. Also told him about pt's current temp. Orders placed for one time administration of 1mg morphine IV as well as CBC and BMP for tomorrow    1748 Suction container changed, pt pulled up in bed and repositioned for comfort, family at bedside    1920 Bedside and Verbal shift change report given to Adria Casillas (oncoming nurse) by Jacy York (offgoing nurse). Report included the following information SBAR, Kardex, Intake/Output and MAR.

## 2019-08-19 NOTE — PROGRESS NOTES
Have LTAC bed at HER unit at [facility]. pt can go on tpn, ngt to suction, therapy and drains. Spoke with Dr Jolane Nageotte will not need iv antibxs. Spoke with mark at the [facility] she will start auth for poss dc tomorrow. Paged quinn rollins to see if can get tpn orders for facility. Notified pt, she is in agreement.

## 2019-08-19 NOTE — PROGRESS NOTES
Problem: Self Care Deficits Care Plan (Adult)  Goal: *Acute Goals and Plan of Care (Insert Text)  Description  Occupational Therapy Goals  Initiated 8/19/2019 within 7 day(s). 1.  Patient will perform grooming tasks while standing with modified independence. 2.  Patient will perform lower body dressing with modified independence utilizing AE, prn.  3.  Patient will perform functional task in standing for 8 minutes with modified independence and fair+ dynamic standing balance. 4.  Patient will perform toilet transfers with modified independence. 5.  Patient will perform all aspects of toileting with modified independence. 6.  Patient will participate in upper extremity therapeutic exercise/activities with modified independence for 8 minutes to maintain BUE strenght for functional transfers & ADLs. 7.  Patient will utilize energy conservation techniques during functional activities with minimal verbal cues. Outcome: Progressing Towards Goal     OCCUPATIONAL THERAPY EVALUATION    Patient: Isis Eisenberg (54 y.o. female)  Date: 8/19/2019  Primary Diagnosis: [facility] [K56.609]  [facility] [K56.609]  S/P small bowel resection [Z90.49]  Procedure(s) (LRB):  LAPAROTOMY EXPLORATORY. Extensive lysis of adhesions. Small bowel resection. Drain placement. (N/A) 9 Days Post-Op   Precautions: Fall  PLOF: Pt was independent with ADLs & functional mobility. ASSESSMENT :  Based on the objective data described below, the patient presents with impairments with regard to bed mobility, functional transfers and ADLs. Pt supine on arrival, agreeable to therapy, reports 7/10 abdominal pain. Good awareness of BOBBY drains, IV lines, and NGT t/o session. Educated on log rolling technique; pt demo'd with min vc's & supervision. Fair seated balance at EOB; min A for LB dressing secondary to pain. Cast in place on RUE due to carpal tunnel syndrome (?); as per pt report. Supervision/SBA For balance during functional transfer to [facility] for toileting task. Supervision for balance in standing during pericare. Min A For line management upon return back to EOB; supervision to return supine. Max A to scoot towards [facility]. Pt reports 6/10 pain at end of session. Needs within reach. Recommend HH upon d/c. Patient will benefit from skilled intervention to address the above impairments. Patient's rehabilitation potential is considered to be Fair  Factors which may influence rehabilitation potential include:   ? None noted  ? Mental ability/status  ? Medical condition  ? Home/family situation and support systems  ? Safety awareness  ? Pain tolerance/management  ? Other:      PLAN :  Recommendations and Planned Interventions:   ?               Self Care Training                  ? Therapeutic Activities  ? Functional Mobility Training   ? Cognitive Retraining  ? Therapeutic Exercises           ? Endurance Activities  ? Balance Training                    ? Neuromuscular Re-Education  ? Visual/Perceptual Training     ? Home Safety Training  ? Patient Education                   ? Family Training/Education  ? Other (comment):    Frequency/Duration: Patient will be followed by occupational therapy 3-5 times a week to address goals. Discharge Recommendations: Home Health  Further Equipment Recommendations for Discharge: shower chair     SUBJECTIVE:   Patient stated \"I didn't get in the shower - I don't know why everyone is saying that.     OBJECTIVE DATA SUMMARY:     Past Medical History:   Diagnosis Date    Abdominal adhesions     Adnexal cyst 7/30/2019    Left    Anemia     Asthma     Chronic pelvic pain in female 7/30/2019    Diabetes mellitus     Dyskinesia     bilateral    Essential hypertension     GERD (gastroesophageal reflux disease)     Hypertension     Menopause     Microhematuria     Rheumatoid arteritis 2018    Stool color black      Past Surgical History:   Procedure Laterality Date    COLONOSCOPY N/A 1/21/2019    COLON[facility] ENDOSCOPY    HX CHOLECYSTECTOMY  6/28/10    HX COLONOSCOPY      HX ENDOSCOPY      HX HERNIA REPAIR      HX HYSTERECTOMY  1989    Fibroids    HX KNEE REPLACEMENT Left     HX KNEE REPLACEMENT Right 06/2018    HX OOPHORECTOMY  12/2000    HX ORTHOPAEDIC Right     ankle- multiple surgeries    HX SMALL BOWEL RESECTION  12/2000    HX SMALL BOWEL RESECTION  02/21/2017    Dr. Carlene Triana       Barriers to Learning/Limitations: None  Compensate with: visual, verbal, tactile, kinesthetic cues/model    Home Situation:   Home Situation  Home Environment: Private residence  # Steps to Enter: 3  Rails to Enter: Yes  Hand Rails : Bilateral  One/Two Story Residence: One story  Living Alone: No  Support Systems: Family member(s)  Patient Expects to be Discharged to[de-identified] Private residence  Current DME Used/Available at Home: Cane, straight, Walker, rolling  Tub or Shower Type: Tub/Shower combination(no shower chair or grab bars)  ? Right hand dominant   ? Left hand dominant    Cognitive/Behavioral Status:  Neurologic State: Alert  Orientation Level: Oriented X4  Cognition: Appropriate decision making; Appropriate for age attention/concentration; Appropriate safety awareness; Follows commands  Safety/Judgement: Awareness of environment; Fall prevention    Skin: Intact (BUEs)  Edema: None noted (BUEs)    Vision/Perceptual:   Acuity: Within Defined Limits      Coordination: BUE  Coordination: Within functional limits  Fine Motor Skills-Upper: Right Intact; Left Intact    Gross Motor Skills-Upper: Right Intact; Left Intact    Balance:  Sitting: Impaired  Sitting - Static: Good (unsupported)  Sitting - Dynamic: Fair (occasional)  Standing: Impaired; With support  Standing - Static: Fair  Standing - Dynamic : Fair    Strength: BUE  Strength: Within functional limits    Tone & Sensation: BUE  Tone: Normal    Range of Motion: BUE  AROM: Within functional limits    Functional Mobility and Transfers for ADLs:  Bed Mobility:  Supine to Sit: Supervision; Additional time  Sit to Supine: Supervision; Additional time    Transfers:  Sit to Stand: Stand-by assistance; Additional time  Stand to Sit: Stand-by assistance   Toilet Transfer : Stand-by assistance; Additional time    ADL Assessment:   Feeding: Setup;Supervision  Oral Facial Hygiene/Grooming: Setup;Supervision  Bathing: Minimum assistance  Upper Body Dressing: Setup;Supervision  Lower Body Dressing: Minimum assistance  Toileting: Setup;Supervision    ADL Intervention:  Toileting  Toileting Assistance: Set-up; Supervision  Bladder Hygiene: Supervision  Clothing Management: Supervision  Adaptive Equipment: Elevated seat;Walker    Set-up/supervision for balance during functional transfer to [facility] in [facility] for toileting task. Supervision for clothing management & pericare with RW for safety. Pt with fair- activity tolerance. Cognitive Retraining  Safety/Judgement: Awareness of environment; Fall prevention    Pain:  Pain level pre-treatment: 7/10   Pain level post-treatment: 6/10  Location: Abdominal pain  Pain Intervention(s): Medication (see MAR); Rest, Ice, Repositioning     Activity Tolerance:  Fair-  Please refer to the flowsheet for vital signs taken during this treatment. After treatment:   ? Patient left in no apparent distress sitting up in chair  ? Patient left in no apparent distress in bed  ? Call bell left within reach  ? Nursing notified  ? Caregiver present  ? Bed alarm activated    COMMUNICATION/EDUCATION:   ? Role of Occupational Therapy in the acute care setting  ? Home safety education was provided and the patient/caregiver indicated understanding.   ? Patient/family have participated as able in goal setting and plan of care. ? Patient/family agree to work toward stated goals and plan of care. ? Patient understands intent and goals of therapy, but is neutral about his/her participation. ? Patient is unable to participate in goal setting and plan of care. Thank you for this referral.  Maxime Robledo MS OTR/L  Time Calculation: 20 mins    Eval Complexity: History: LOW Complexity : Brief history review ; Examination: LOW Complexity : 1-3 performance deficits relating to physical, cognitive , or psychosocial skils that result in activity limitations and / or participation restrictions ; Decision Making:MEDIUM Complexity : Patient may present with comorbidities that affect occupational performnce.  Miniml to moderate modification of tasks or assistance (eg, physical or verbal ) with assesment(s) is necessary to enable patient to complete evaluation

## 2019-08-20 LAB
ANION GAP SERPL CALC-SCNC: 8 MMOL/L (ref 3–18)
BASOPHILS # BLD: 0 K/UL (ref 0–0.1)
BASOPHILS NFR BLD: 0 % (ref 0–2)
BUN SERPL-MCNC: 21 MG/DL (ref 7–18)
BUN/CREAT SERPL: 21 (ref 12–20)
CALCIUM SERPL-MCNC: 7.9 MG/DL (ref 8.5–10.1)
CHLORIDE SERPL-SCNC: 102 MMOL/L (ref 100–111)
CO2 SERPL-SCNC: 25 MMOL/L (ref 21–32)
CREAT SERPL-MCNC: 0.98 MG/DL (ref 0.6–1.3)
DIFFERENTIAL METHOD BLD: ABNORMAL
EOSINOPHIL # BLD: 0.2 K/UL (ref 0–0.4)
EOSINOPHIL NFR BLD: 2 % (ref 0–5)
ERYTHROCYTE [DISTWIDTH] IN BLOOD BY AUTOMATED COUNT: 17 % (ref 11.6–14.5)
GLUCOSE BLD STRIP.AUTO-MCNC: 176 MG/DL (ref 70–110)
GLUCOSE BLD STRIP.AUTO-MCNC: 236 MG/DL (ref 70–110)
GLUCOSE BLD STRIP.AUTO-MCNC: 267 MG/DL (ref 70–110)
GLUCOSE BLD STRIP.AUTO-MCNC: 269 MG/DL (ref 70–110)
GLUCOSE SERPL-MCNC: 238 MG/DL (ref 74–99)
HCT VFR BLD AUTO: 24.7 % (ref 35–45)
HGB BLD-MCNC: 7.7 G/DL (ref 12–16)
LYMPHOCYTES # BLD: 1.2 K/UL (ref 0.9–3.6)
LYMPHOCYTES NFR BLD: 12 % (ref 21–52)
MCH RBC QN AUTO: 26.9 PG (ref 24–34)
MCHC RBC AUTO-ENTMCNC: 31.2 G/DL (ref 31–37)
MCV RBC AUTO: 86.4 FL (ref 74–97)
MONOCYTES # BLD: 1.5 K/UL (ref 0.05–1.2)
MONOCYTES NFR BLD: 15 % (ref 3–10)
NEUTS SEG # BLD: 7.2 K/UL (ref 1.8–8)
NEUTS SEG NFR BLD: 71 % (ref 40–73)
PLATELET # BLD AUTO: 630 K/UL (ref 135–420)
PMV BLD AUTO: 10.1 FL (ref 9.2–11.8)
POTASSIUM SERPL-SCNC: 4.5 MMOL/L (ref 3.5–5.5)
RBC # BLD AUTO: 2.86 M/UL (ref 4.2–5.3)
SODIUM SERPL-SCNC: 135 MMOL/L (ref 136–145)
WBC # BLD AUTO: 10.1 K/UL (ref 4.6–13.2)

## 2019-08-20 PROCEDURE — 80048 BASIC METABOLIC PNL TOTAL CA: CPT

## 2019-08-20 PROCEDURE — 74011250636 HC RX REV CODE- 250/636: Performed by: SURGERY

## 2019-08-20 PROCEDURE — 74011000250 HC RX REV CODE- 250: Performed by: SURGERY

## 2019-08-20 PROCEDURE — 97530 THERAPEUTIC ACTIVITIES: CPT

## 2019-08-20 PROCEDURE — 74011636637 HC RX REV CODE- 636/637: Performed by: SURGERY

## 2019-08-20 PROCEDURE — 74011000258 HC RX REV CODE- 258: Performed by: SURGERY

## 2019-08-20 PROCEDURE — 82962 GLUCOSE BLOOD TEST: CPT

## 2019-08-20 PROCEDURE — 65270000029 HC RM PRIVATE

## 2019-08-20 PROCEDURE — 85025 COMPLETE CBC W/AUTO DIFF WBC: CPT

## 2019-08-20 PROCEDURE — 97535 SELF CARE MNGMENT TRAINING: CPT

## 2019-08-20 PROCEDURE — 36415 COLL VENOUS BLD VENIPUNCTURE: CPT

## 2019-08-20 PROCEDURE — 74011250636 HC RX REV CODE- 250/636: Performed by: NURSE PRACTITIONER

## 2019-08-20 RX ORDER — INSULIN GLARGINE 100 [IU]/ML
50 INJECTION, SOLUTION SUBCUTANEOUS DAILY
Status: DISCONTINUED | OUTPATIENT
Start: 2019-08-21 | End: 2019-08-20

## 2019-08-20 RX ORDER — MORPHINE SULFATE 2 MG/ML
1 INJECTION, SOLUTION INTRAMUSCULAR; INTRAVENOUS ONCE
Status: COMPLETED | OUTPATIENT
Start: 2019-08-20 | End: 2019-08-20

## 2019-08-20 RX ORDER — INSULIN GLARGINE 100 [IU]/ML
50 INJECTION, SOLUTION SUBCUTANEOUS DAILY
Status: DISCONTINUED | OUTPATIENT
Start: 2019-08-20 | End: 2019-08-22 | Stop reason: HOSPADM

## 2019-08-20 RX ADMIN — DIPHENHYDRAMINE HYDROCHLORIDE 25 MG: 50 INJECTION, SOLUTION INTRAMUSCULAR; INTRAVENOUS at 00:53

## 2019-08-20 RX ADMIN — KETOROLAC TROMETHAMINE 15 MG: 15 INJECTION, SOLUTION INTRAMUSCULAR; INTRAVENOUS at 04:35

## 2019-08-20 RX ADMIN — INSULIN LISPRO 3 UNITS: 100 INJECTION, SOLUTION INTRAVENOUS; SUBCUTANEOUS at 00:53

## 2019-08-20 RX ADMIN — CALCIUM GLUCONATE: 94 INJECTION, SOLUTION INTRAVENOUS at 22:00

## 2019-08-20 RX ADMIN — INSULIN LISPRO 9 UNITS: 100 INJECTION, SOLUTION INTRAVENOUS; SUBCUTANEOUS at 06:16

## 2019-08-20 RX ADMIN — INSULIN LISPRO 9 UNITS: 100 INJECTION, SOLUTION INTRAVENOUS; SUBCUTANEOUS at 12:31

## 2019-08-20 RX ADMIN — DIPHENHYDRAMINE HYDROCHLORIDE 25 MG: 50 INJECTION, SOLUTION INTRAMUSCULAR; INTRAVENOUS at 18:30

## 2019-08-20 RX ADMIN — ONDANSETRON 6 MG: 2 SOLUTION INTRAMUSCULAR; INTRAVENOUS at 00:52

## 2019-08-20 RX ADMIN — DIPHENHYDRAMINE HYDROCHLORIDE 25 MG: 50 INJECTION, SOLUTION INTRAMUSCULAR; INTRAVENOUS at 11:42

## 2019-08-20 RX ADMIN — MORPHINE SULFATE 1 MG: 2 INJECTION, SOLUTION INTRAMUSCULAR; INTRAVENOUS at 22:43

## 2019-08-20 RX ADMIN — INSULIN GLARGINE 50 UNITS: 100 INJECTION, SOLUTION SUBCUTANEOUS at 10:24

## 2019-08-20 RX ADMIN — INSULIN LISPRO 6 UNITS: 100 INJECTION, SOLUTION INTRAVENOUS; SUBCUTANEOUS at 18:25

## 2019-08-20 RX ADMIN — PIPERACILLIN SODIUM,TAZOBACTAM SODIUM 3.38 G: 3; .375 INJECTION, POWDER, FOR SOLUTION INTRAVENOUS at 06:08

## 2019-08-20 NOTE — PROGRESS NOTES
PharmD sent TPN orders from this encounter, and respective lab work to PharmD who will be following patient's TPN at [facility].         Edvin Roldan, Darya  Clinical Pharmacy Specialist

## 2019-08-20 NOTE — PROGRESS NOTES
Problem: Diabetes Self-Management  Goal: *Disease process and treatment process  Description  Define diabetes and identify own type of diabetes; list 3 options for treating diabetes. Outcome: Progressing Towards Goal  Goal: *Incorporating nutritional management into lifestyle  Description  Describe effect of type, amount and timing of food on blood glucose; list 3 methods for planning meals. Outcome: Progressing Towards Goal  Goal: *Incorporating physical activity into lifestyle  Description  State effect of exercise on blood glucose levels. Outcome: Progressing Towards Goal  Goal: *Developing strategies to promote health/change behavior  Description  Define the ABC's of diabetes; identify appropriate screenings, schedule and personal plan for screenings. Outcome: Progressing Towards Goal  Goal: *Using medications safely  Description  State effect of diabetes medications on diabetes; name diabetes medication taking, action and side effects. Outcome: Progressing Towards Goal  Goal: *Monitoring blood glucose, interpreting and using results  Description  Identify recommended blood glucose targets  and personal targets. Outcome: Progressing Towards Goal  Goal: *Prevention, detection, treatment of acute complications  Description  List symptoms of hyper- and hypoglycemia; describe how to treat low blood sugar and actions for lowering  high blood glucose level. Outcome: Progressing Towards Goal  Goal: *Prevention, detection and treatment of chronic complications  Description  Define the natural course of diabetes and describe the relationship of blood glucose levels to long term complications of diabetes.   Outcome: Progressing Towards Goal  Goal: *Developing strategies to address psychosocial issues  Description  Describe feelings about living with diabetes; identify support needed and support network  Outcome: Progressing Towards Goal  Goal: *Sick day guidelines  Outcome: Progressing Towards Goal  Goal: *Patient Specific Goal (EDIT GOAL, INSERT TEXT)  Outcome: Progressing Towards Goal     Problem: Patient Education: Go to Patient Education Activity  Goal: Patient/Family Education  Outcome: Progressing Towards Goal     Problem: Falls - Risk of  Goal: *Absence of Falls  Description  Document Kyle Yañez Fall Risk and appropriate interventions in the flowsheet. Outcome: Progressing Towards Goal  Note:   Fall Risk Interventions:  Mobility Interventions: Patient to call before getting OOB    Mentation Interventions: Door open when patient unattended, Familiar objects from home, Eyeglasses and hearing aids, Update white board    Medication Interventions: Patient to call before getting OOB, Teach patient to arise slowly    Elimination Interventions: Call light in reach, Patient to call for help with toileting needs              Problem: Patient Education: Go to Patient Education Activity  Goal: Patient/Family Education  Outcome: Progressing Towards Goal     Problem: Discharge Planning  Goal: *Discharge to safe environment  Outcome: Progressing Towards Goal     Problem: Pressure Injury - Risk of  Goal: *Prevention of pressure injury  Description  Document Cristi Scale and appropriate interventions in the flowsheet.   Outcome: Progressing Towards Goal  Note:   Pressure Injury Interventions:  Sensory Interventions: Assess changes in LOC, Maintain/enhance activity level, Keep linens dry and wrinkle-free    Moisture Interventions: Absorbent underpads, Apply protective barrier, creams and emollients    Activity Interventions: Increase time out of bed, Pressure redistribution bed/mattress(bed type)    Mobility Interventions: HOB 30 degrees or less, Pressure redistribution bed/mattress (bed type)    Nutrition Interventions: Document food/fluid/supplement intake, Offer support with meals,snacks and hydration    Friction and Shear Interventions: HOB 30 degrees or less, Lift sheet, Apply protective barrier, creams and emollients Problem: Patient Education: Go to Patient Education Activity  Goal: Patient/Family Education  Outcome: Progressing Towards Goal     Problem: Pain  Goal: *Control of Pain  Outcome: Progressing Towards Goal     Problem: Patient Education: Go to Patient Education Activity  Goal: Patient/Family Education  Outcome: Progressing Towards Goal     Problem: Patient Education: Go to Patient Education Activity  Goal: Patient/Family Education  Outcome: Progressing Towards Goal     Problem: Surgical Pathway Post-Op Day 2 through Discharge  Goal: Off Pathway (Use only if patient is Off Pathway)  Outcome: Progressing Towards Goal  Goal: Activity/Safety  Outcome: Progressing Towards Goal  Goal: Nutrition/Diet  Outcome: Progressing Towards Goal  Goal: Discharge Planning  Outcome: Progressing Towards Goal  Goal: Medications  Outcome: Progressing Towards Goal  Goal: Respiratory  Outcome: Progressing Towards Goal  Goal: Treatments/Interventions/Procedures  Outcome: Progressing Towards Goal  Goal: Psychosocial  Outcome: Progressing Towards Goal  Goal: *No signs and symptoms of infection or wound complications  Outcome: Progressing Towards Goal  Goal: *Optimal pain control at patient's stated goal  Outcome: Progressing Towards Goal  Goal: *Adequate urinary output (equal to or greater than 30 milliliters/hour)  Outcome: Progressing Towards Goal  Goal: *Hemodynamically stable  Outcome: Progressing Towards Goal  Goal: *Tolerating diet  Outcome: Progressing Towards Goal  Goal: *Demonstrates progressive activity  Outcome: Progressing Towards Goal  Goal: *Lungs clear or at baseline  Outcome: Progressing Towards Goal     Problem: Surgical Pathway: Discharge Outcomes  Goal: *Hemodynamically stable  Outcome: Progressing Towards Goal  Goal: *Lungs clear or at baseline  Outcome: Progressing Towards Goal  Goal: *Demonstrates independent activity or return to baseline  Outcome: Progressing Towards Goal  Goal: *Optimal pain control at patient's stated goal  Outcome: Progressing Towards Goal  Goal: *Verbalizes understanding and describes prescribed diet  Outcome: Progressing Towards Goal  Goal: *Tolerating diet  Outcome: Progressing Towards Goal  Goal: *Verbalizes name, dosage, time, side effects, and number of days to continue medications  Outcome: Progressing Towards Goal  Goal: *No signs and symptoms of infection or wound complications  Outcome: Progressing Towards Goal  Goal: *Anxiety reduced or absent  Outcome: Progressing Towards Goal  Goal: *Understands and describes signs and symptoms to report to providers(Stroke Metric)  Outcome: Progressing Towards Goal  Goal: *Describes follow-up/return visits to physicians  Outcome: Progressing Towards Goal  Goal: *Describes available resources and support systems  Outcome: Progressing Towards Goal     Problem: Nutrition Deficit  Goal: *Optimize nutritional status  Outcome: Progressing Towards Goal     Problem: Diabetes Maintenance:Admission  Goal: *Blood glucose 80 to 180 mg/dl  Outcome: Progressing Towards Goal     Problem: Nutrition Deficit  Goal: *Optimize nutritional status  Outcome: Progressing Towards Goal     Problem: Patient Education: Go to Patient Education Activity  Goal: Patient/Family Education  Outcome: Progressing Towards Goal     Problem: Patient Education: Go to Patient Education Activity  Goal: Patient/Family Education  Outcome: Progressing Towards Goal

## 2019-08-20 NOTE — PROGRESS NOTES
0730: Patient care resumed at this time. Patient is in bed resting with call light in reach. 0945: Insulin order was changed from 45 units to 50 units of Lantus. Patient's  requested for him to be able to give her a bath. Towels and soap provided to . Gown and linen changed. 1130: Patient requested Benadryl. See MAR.     1636: MEWS score 3 due to tachycardia. Patient baseline. Will recheck in 30 minutes. 1700: Vital signs no changes, will continue to monitor. PICC dressing changed, NG tube flushed. Suction chamber emptied. 1900: Oncoming nurse made aware of the mews score.

## 2019-08-20 NOTE — PROGRESS NOTES
Received auth from [facility] for HER unit for LTAC. They can accept tomorrow. fax'd TPN orders to them via edc. Notified pt  DR Fartun Cohn needs to call Dr Piedad Oates there and speak with him. They require this. The number is . He can do so today or tomorrow am.     Will need dc summary done in am to include tpn and ngt orders. Notified Dr Fartun Cohn of above.

## 2019-08-20 NOTE — PROGRESS NOTES
Attempted PT, pt declined, stated she did not sleep well and is not up to it at this time. Will continue to follow.  Rajendra Weinberg, PTA

## 2019-08-20 NOTE — PROGRESS NOTES
eloy started yesterday for HER unit at [facility]. for ltac level of care. Pt needs op TPN orders and will keep ngt to suction, lucas fistula drain. Will notify Dr Jonnathan Penn when Tracey Mireless obtained.

## 2019-08-20 NOTE — ROUTINE PROCESS
Bedside and Verbal shift change report given to Live Mccormick RN and Anahi Ziegler RN (oncoming nurse) by Sarah Beth Tripathi RN, BSN   (offgoing nurse). Report included the following information SBAR, Kardex, Procedure Summary, Intake/Output, MAR and Recent Results.      Sarah Beth Tripathi RN, BSN

## 2019-08-20 NOTE — PROGRESS NOTES
Problem: Self Care Deficits Care Plan (Adult)  Goal: *Acute Goals and Plan of Care (Insert Text)  Description  Occupational Therapy Goals  Initiated 8/19/2019 within 7 day(s). 1.  Patient will perform grooming tasks while standing with modified independence. 2.  Patient will perform lower body dressing with modified independence utilizing AE, prn.  3.  Patient will perform functional task in standing for 8 minutes with modified independence and fair+ dynamic standing balance. 4.  Patient will perform toilet transfers with modified independence. 5.  Patient will perform all aspects of toileting with modified independence. 6.  Patient will participate in upper extremity therapeutic exercise/activities with modified independence for 8 minutes to maintain BUE strenght for functional transfers & ADLs. 7.  Patient will utilize energy conservation techniques during functional activities with minimal verbal cues. Outcome: Progressing Towards Goal   OCCUPATIONAL THERAPY TREATMENT    Patient: Edwin Haas (89 y.o. female)  Date: 8/20/2019  Diagnosis: SBO (small bowel obstruction) ([facility]) [K56.609]  [facility] [K56.609]  S/P small bowel resection [Z90.49] SBO (small bowel obstruction) ([facility])  Procedure(s) (LRB):  LAPAROTOMY EXPLORATORY. Extensive lysis of adhesions. Small bowel resection. Drain placement. (N/A) 10 Days Post-Op  Precautions: Fall  PLOF: Independent    Chart, occupational therapy assessment, plan of care, and goals were reviewed. ASSESSMENT:  Pt requires min vc's for safety and pacing w/functional transfers 2/2 multiple line mgt. Pt tolerates LUE TherEx w/theraband at chair level. Pt demonstrates good safety and balance w/toileting ADL. Educated on stand pivot transfer to increase safety w/multiple line mgt. Pt left in chair and reinforced importance of calling for assistance.    Progression toward goals:  ?          Improving appropriately and progressing toward goals  ? Improving slowly and progressing toward goals  ? Not making progress toward goals and plan of care will be adjusted     PLAN:  Patient continues to benefit from skilled intervention to address the above impairments. Continue treatment per established plan of care. Discharge Recommendations:  LTAC   Further Equipment Recommendations for Discharge:  shower chair     SUBJECTIVE:   Patient stated I do need to use the bathroom.     OBJECTIVE DATA SUMMARY:   Cognitive/Behavioral Status:  Neurologic State: Alert  Orientation Level: Oriented X4  Cognition: Follows commands  Safety/Judgement: Awareness of environment, Fall prevention    Functional Mobility and Transfers for ADLs:   Bed Mobility:  Supine to Sit: Supervision   Transfers:  Sit to Stand: Supervision  Bed to Chair: Stand-by assistance(EOB --> chiar xfer w/SPT)   Toilet Transfer : Stand-by assistance(chair <--> BSC xfer w/SPT)  Balance:  Sitting: Intact  Standing: Intact; Without support    ADL Intervention:  Grooming  Washing Hands: Set-up    Toileting  Toileting Assistance: Stand-by assistance  Bladder Hygiene: Supervision  Clothing Management: Stand-by assistance    UE Therapeutic Exercises:   LUE TherEx w/red theraband bicep curls 2x10, at chair level    Pain:  Pain level pre-treatment: 0/10   Pain level post-treatment: 0/10    Activity Tolerance:    Good    Please refer to the flowsheet for vital signs taken during this treatment. After treatment:   ?  Patient left in no apparent distress sitting up in chair  ? Patient left in no apparent distress in bed  ? Call bell left within reach  ? Nursing notified  ?  family present  ? Bed alarm activated    COMMUNICATION/EDUCATION:   ? Role of Occupational Therapy in the acute care setting  ? Home safety education was provided and the patient/caregiver indicated understanding. ? Patient/family have participated as able in working towards goals and plan of care.   ? Patient/family agree to work toward stated goals and plan of care. ? Patient understands intent and goals of therapy, but is neutral about his/her participation. ? Patient is unable to participate in goal setting and plan of care.       Thank you for this referral.  ROME[address] Way  Time Calculation: 24 mins

## 2019-08-20 NOTE — PROGRESS NOTES
completed follow up visit with patient in room 2215 this morning. and a Spiritual assessment of patient was done Family was also present in the room this morning. . Chaplains will continue to follow and will provide pastoral care on an as needed/requested basis    nikita Javier   Board Certified [address] [place] [address]   [phone]

## 2019-08-20 NOTE — PROGRESS NOTES
Patient seen and examined. Doing well. No issues overnight. No fever. Mild tachycardia  NG tube is still high output. BOBBY fistula drain is decreasing. About 100 cc. IF drain has no drainage. Plan:  I will D/C the antibiotics  I will remove the IR placed drain. It was in a hematoma and it is not draining at all. Discharge planing to [facility] general rehab place  Follow up with me in 1 week.

## 2019-08-20 NOTE — PROGRESS NOTES
Bedside and Verbal shift change report given to Janie Barrett RN (oncoming nurse) by Luis Alfredo Foote RN (offgoing nurse). Report included the following information SBAR, Kardex, Procedure Summary, Intake/Output and MAR.     2150- Paged Dr. Alyce Lawler for pain mediation. 2200- Verbal order from Dr. Alyce Lawler for a one time morphine 1 mg IV for pain. Bedside and Verbal shift change report given to Milagros Freeman RN (oncoming nurse) by Janie Barrett RN (offgoing nurse). Report included the following information SBAR, Kardex, Procedure Summary, Intake/Output and MAR.

## 2019-08-21 ENCOUNTER — APPOINTMENT (OUTPATIENT)
Dept: CT IMAGING | Age: 59
DRG: 330 | End: 2019-08-21
Attending: NURSE PRACTITIONER
Payer: COMMERCIAL

## 2019-08-21 LAB
BACTERIA SPEC CULT: ABNORMAL
BACTERIA SPEC CULT: NORMAL
GLUCOSE BLD STRIP.AUTO-MCNC: 180 MG/DL (ref 70–110)
GLUCOSE BLD STRIP.AUTO-MCNC: 201 MG/DL (ref 70–110)
GLUCOSE BLD STRIP.AUTO-MCNC: 213 MG/DL (ref 70–110)
GLUCOSE BLD STRIP.AUTO-MCNC: 213 MG/DL (ref 70–110)
GRAM STN SPEC: NORMAL
GRAM STN SPEC: NORMAL
SERVICE CMNT-IMP: ABNORMAL
SERVICE CMNT-IMP: NORMAL

## 2019-08-21 PROCEDURE — 77030040361 HC SLV COMPR DVT MDII -B

## 2019-08-21 PROCEDURE — 97116 GAIT TRAINING THERAPY: CPT

## 2019-08-21 PROCEDURE — 65270000029 HC RM PRIVATE

## 2019-08-21 PROCEDURE — 82962 GLUCOSE BLOOD TEST: CPT

## 2019-08-21 PROCEDURE — 74176 CT ABD & PELVIS W/O CONTRAST: CPT

## 2019-08-21 PROCEDURE — 74011250636 HC RX REV CODE- 250/636: Performed by: SURGERY

## 2019-08-21 PROCEDURE — 74011636637 HC RX REV CODE- 636/637: Performed by: SURGERY

## 2019-08-21 PROCEDURE — 74011250636 HC RX REV CODE- 250/636: Performed by: NURSE PRACTITIONER

## 2019-08-21 RX ORDER — SODIUM CHLORIDE 9 MG/ML
1000 INJECTION, SOLUTION INTRAVENOUS ONCE
Status: COMPLETED | OUTPATIENT
Start: 2019-08-21 | End: 2019-08-21

## 2019-08-21 RX ORDER — ACETAMINOPHEN 650 MG/1
650 SUPPOSITORY RECTAL
Status: DISCONTINUED | OUTPATIENT
Start: 2019-08-21 | End: 2019-08-22 | Stop reason: HOSPADM

## 2019-08-21 RX ORDER — KETOROLAC TROMETHAMINE 15 MG/ML
15 INJECTION, SOLUTION INTRAMUSCULAR; INTRAVENOUS
Status: DISCONTINUED | OUTPATIENT
Start: 2019-08-21 | End: 2019-08-22 | Stop reason: HOSPADM

## 2019-08-21 RX ADMIN — KETOROLAC TROMETHAMINE 15 MG: 15 INJECTION, SOLUTION INTRAMUSCULAR; INTRAVENOUS at 21:44

## 2019-08-21 RX ADMIN — DIPHENHYDRAMINE HYDROCHLORIDE 25 MG: 50 INJECTION, SOLUTION INTRAMUSCULAR; INTRAVENOUS at 02:55

## 2019-08-21 RX ADMIN — INSULIN LISPRO 3 UNITS: 100 INJECTION, SOLUTION INTRAVENOUS; SUBCUTANEOUS at 18:25

## 2019-08-21 RX ADMIN — DIPHENHYDRAMINE HYDROCHLORIDE 25 MG: 50 INJECTION, SOLUTION INTRAMUSCULAR; INTRAVENOUS at 11:12

## 2019-08-21 RX ADMIN — INSULIN LISPRO 6 UNITS: 100 INJECTION, SOLUTION INTRAVENOUS; SUBCUTANEOUS at 00:00

## 2019-08-21 RX ADMIN — DIPHENHYDRAMINE HYDROCHLORIDE 25 MG: 50 INJECTION, SOLUTION INTRAMUSCULAR; INTRAVENOUS at 17:53

## 2019-08-21 RX ADMIN — INSULIN GLARGINE 50 UNITS: 100 INJECTION, SOLUTION SUBCUTANEOUS at 09:00

## 2019-08-21 RX ADMIN — INSULIN LISPRO 6 UNITS: 100 INJECTION, SOLUTION INTRAVENOUS; SUBCUTANEOUS at 07:58

## 2019-08-21 RX ADMIN — SODIUM CHLORIDE 1000 ML: 900 INJECTION, SOLUTION INTRAVENOUS at 11:10

## 2019-08-21 RX ADMIN — KETOROLAC TROMETHAMINE 15 MG: 15 INJECTION, SOLUTION INTRAMUSCULAR; INTRAVENOUS at 13:25

## 2019-08-21 RX ADMIN — INSULIN LISPRO 6 UNITS: 100 INJECTION, SOLUTION INTRAVENOUS; SUBCUTANEOUS at 13:00

## 2019-08-21 NOTE — ROUTINE PROCESS
Bedside shift change report given to Farzana (oncoming nurse) by Anders Hardwick (offgoing nurse). Report included the following information SBAR, Kardex, Intake/Output and MAR.

## 2019-08-21 NOTE — PROGRESS NOTES
I called Dr. Franca Bunn and left a voice mail. I wrote the discharge instructions.    TPN orders has to be given by nutrition team

## 2019-08-21 NOTE — PROGRESS NOTES
I decided not to discharge the patient today and to do a CT scan of abdomen and pelvis because her BOBBY drain output has decreased significantly and she was complaining of more abdominal pain as well as she was slightly more tachycardic than usual. I just wanted to make sure that the drain is not occluded and the patient is collecting from her leak. However the CT scan of abdomen and pelvis did not show any new collection or any change from the previous CT scan. So at this point we will plan to discharge the patient to rehab tomorrow but I will get some labs for tomorrow and we will observe her closely till than.

## 2019-08-21 NOTE — PROGRESS NOTES
Bedside and Verbal shift change report given to [address], RN (oncoming nurse) by Nick James RN (offgoing nurse). Report included the following information SBAR, Kardex, Procedure Summary, Intake/Output and MAR.     2120- Verbal order from Dr. Lisy Au for tylenol suppository for temperature greater than 100.1 and for mild pain, headache.     0123- Paged Dr. Lisy Au regarding TPN being discontinued. Verbal order for D5 NS @ 100cc/hr continuous.

## 2019-08-21 NOTE — PROGRESS NOTES
Problem: Mobility Impaired (Adult and Pediatric)  Goal: *Acute Goals and Plan of Care (Insert Text)  Description  Physical Therapy Goals  Initiated 8/17/2019 and to be accomplished within 7 day(s)  1. Patient will move from supine to sit and sit to supine , scoot up and down and roll side to side in bed with modified independence. 2.  Patient will transfer from bed to chair and chair to bed with modified independence using the least restrictive device. 3.  Patient will perform sit to stand with modified independence. 4.  Patient will ambulate with modified independence for >/= 150 feet with the least restrictive device. 5.  Patient will ascend/descend 3 stairs with handrail(s) with modified independence. Outcome: Progressing Towards Goal   PHYSICAL THERAPY TREATMENT    Patient: Cele Hopkins (34 y.o. female)  Date: 8/21/2019  Diagnosis: SBO (small bowel obstruction) ([facility]) [K56.609]  [facility] [K56.609]  S/P small bowel resection [Z90.49] SBO (small bowel obstruction) ([facility])  Procedure(s) (LRB):  LAPAROTOMY EXPLORATORY. Extensive lysis of adhesions. Small bowel resection. Drain placement. (N/A) 11 Days Post-Op  Precautions: Fall  PLOF: Independent    ASSESSMENT:  Pt doing well, supervision, bed mobility, transfers. Pt limited by lines/ NG to suction. Educated pt on benefit of OOB. Progression toward goals:   ?      Improving appropriately and progressing toward goals  ? Improving slowly and progressing toward goals  ? Not making progress toward goals and plan of care will be adjusted     PLAN:  Patient continues to benefit from skilled intervention to address the above impairments. Continue treatment per established plan of care. Discharge Recommendations:  Rehab  Further Equipment Recommendations for Discharge:  N/A     SUBJECTIVE:   Patient stated Its just one thing after another.     OBJECTIVE DATA SUMMARY:   Critical Behavior:  Neurologic State: Alert  Orientation Level: Oriented X4  Cognition: Follows commands  Safety/Judgement: Awareness of environment, Fall prevention  Functional Mobility Training:  Bed Mobility:  Supine to Sit: Supervision  Sit to Supine: Supervision  Transfers:  Sit to Stand: Supervision  Stand to Sit: Supervision  Balance:  Sitting: Intact  Standing: Intact     Pain:  Pain level pre-treatment: 0/10  Pain level post-treatment: 0/10   Pain Intervention(s): n/a      Activity Tolerance:   Good   Please refer to the flowsheet for vital signs taken during this treatment. After treatment:   ? Patient left in no apparent distress sitting up in chair  ? Patient left in no apparent distress in bed  ? Call bell left within reach  ? Nursing notified  ? Caregiver present  ? Bed alarm activated  ? SCDs applied      COMMUNICATION/EDUCATION:   ?           ? Fall prevention education was provided and the patient/caregiver indicated understanding. ? Patient/family have participated as able in working toward goals and plan of care. ?         Patient/family agree to work toward stated goals and plan of care. ?         Patient understands intent and goals of therapy, but is neutral about his/her participation. ? Patient is unable to participate in stated goals/plan of care: ongoing with therapy staff. ?         Role of Physical Therapy in the acute care setting.         Tasha Ortega, PTA   Time Calculation: 12 mins

## 2019-08-21 NOTE — DISCHARGE SUMMARY
General Surgery Discharge Note    Admission Date: 8/10/2019    Discharge Date: 8/21/2019    Admission Diagnosis:  Small bowel obstruction    Discharge Diagnosis:  Small bowel obstruction    Procedures Performed:   Exploratory laparotomy, small bowel resection, and BOBBY drain placement. Hospital Course:  Patient was admitted on 8/10/2019 one day after she was discharged home from a laparotomy and small bowel resection. The patient started having nausea and vomiting and in the ER, a CT scan showed bowel obstruction secondary to a possible internal hernia. The patient was take urgently to OR and she did not have internal hernia but had severe adhesions causing kinking of the small bowel and had to undergo another small bowel resection. A drain was placed intraoperatively because of the risk of leak. Unfortunately the patient did develop a leak which lead to an enterocutaneous fistulae through the drain. She was kept NPO and started on IV antibiotics and TPN and had multiple CT scan to rule out collection. Condition on Discharge:  Stable. Follow-up care : Follow-up in 2 weeks in the clinic ([address] [place] [address] instructions provided)    Disposition:  Rehab place.      Discharge Medications:      Current Facility-Administered Medications   Medication Dose Route Frequency    insulin glargine (LANTUS) injection 50 Units  50 Units SubCUTAneous DAILY    TPN ADULT - CENTRAL   IntraVENous CONTINUOUS    dextrose 10 % infusion 125-250 mL  125-250 mL IntraVENous PRN    ondansetron (ZOFRAN) injection 6 mg  6 mg IntraVENous Q4H PRN    insulin lispro (HUMALOG) injection   SubCUTAneous Q6H    glucagon (GLUCAGEN) injection 1 mg  1 mg IntraMUSCular PRN    glucose chewable tablet 16 g  4 Tab Oral PRN    phenol throat spray (CHLORASEPTIC) 1 Spray  1 Spray Oral PRN    lidocaine (XYLOCAINE) 2 % viscous solution 15 mL  15 mL Mouth/Throat PRN    diphenhydrAMINE (BENADRYL) injection 25 mg  25 mg IntraVENous Q6H PRN       Local wound care with daily showers, keep wounds clean and dry    Activity: as desired, no lifting greater than 15lbs or situps for 30 days    Special Instructions:   NG tube to suction at all time (low intermittent is fine)   TPN   NPO   BOBBY drain to bulb suction   My cell phone is [phone]. Thank you.

## 2019-08-21 NOTE — PROGRESS NOTES
Transfer cancelled per Kevin Elkins she spoke with Dr Kayce Colon. Pt having increased ng output, pain and tenderness around drain site, will do ct r/o new collection and give ivf bolus. Notified mark at [facility] unit. Transport cancelled by Mario Alberto. Notified pt and nurse Chance Jin.

## 2019-08-21 NOTE — PROGRESS NOTES
Patient seen and examined. No issues overnight. No fever but still having tachycardia. NG tube is very high. About 2.5 liters. BOBBY fistulae drain is minimal. Less than 10 which is surprising but good. I do not think it is occluded because I checked it and also her abdomen is soft and non-tender. She was accepted for transfer today to nursing home. I will dictate the discharge summary and instructions.

## 2019-08-21 NOTE — ED NOTES
Spoke with Dr. Harsh Pollack regarding patient complaint of right upper quadrant abdominal tenderness at drain site, minimal BOBBY drain output, fluid in BOBBY drain is now thick and dark green. NG output has been consistently high. Plan:   CT abdomen pelvis ordered to rule out fluid collection  IV normal saline bolus for hydration  Delay discharge to LTAC due to current condition.    Grant Suárez () will contact insurance company with update  Dr. Harsh Pollack plan to contact Dr. Delaney Pepper (facility MD) with status update     Signed By: Nori Lundberg NP        August 21, 2019

## 2019-08-21 NOTE — PROGRESS NOTES
0730 Received pt in bed. With NGT at 48 cm to cont low suction with green output. PICC line on right arm with blood return. Pain under control. 1010 To CT scan, per NP want to make sure there is no accumulation of fluid in her stomach since pt been draining a lot from NGT.    1105 Back to room    1600 Pt resting, pain under control.

## 2019-08-22 VITALS
HEART RATE: 108 BPM | RESPIRATION RATE: 16 BRPM | BODY MASS INDEX: 28 KG/M2 | DIASTOLIC BLOOD PRESSURE: 75 MMHG | HEIGHT: 64 IN | TEMPERATURE: 99.1 F | SYSTOLIC BLOOD PRESSURE: 119 MMHG | OXYGEN SATURATION: 98 % | WEIGHT: 164 LBS

## 2019-08-22 LAB
BASOPHILS # BLD: 0 K/UL (ref 0–0.1)
BASOPHILS NFR BLD: 0 % (ref 0–2)
DIFFERENTIAL METHOD BLD: ABNORMAL
EOSINOPHIL # BLD: 0.2 K/UL (ref 0–0.4)
EOSINOPHIL NFR BLD: 2 % (ref 0–5)
ERYTHROCYTE [DISTWIDTH] IN BLOOD BY AUTOMATED COUNT: 17.4 % (ref 11.6–14.5)
GLUCOSE BLD STRIP.AUTO-MCNC: 71 MG/DL (ref 70–110)
GLUCOSE BLD STRIP.AUTO-MCNC: 83 MG/DL (ref 70–110)
HCT VFR BLD AUTO: 20.8 % (ref 35–45)
HGB BLD-MCNC: 6.3 G/DL (ref 12–16)
LYMPHOCYTES # BLD: 1.5 K/UL (ref 0.9–3.6)
LYMPHOCYTES NFR BLD: 13 % (ref 21–52)
MCH RBC QN AUTO: 27 PG (ref 24–34)
MCHC RBC AUTO-ENTMCNC: 30.3 G/DL (ref 31–37)
MCV RBC AUTO: 89.3 FL (ref 74–97)
MONOCYTES # BLD: 1.5 K/UL (ref 0.05–1.2)
MONOCYTES NFR BLD: 13 % (ref 3–10)
NEUTS SEG # BLD: 8.2 K/UL (ref 1.8–8)
NEUTS SEG NFR BLD: 72 % (ref 40–73)
PLATELET # BLD AUTO: 492 K/UL (ref 135–420)
PMV BLD AUTO: 10.1 FL (ref 9.2–11.8)
RBC # BLD AUTO: 2.33 M/UL (ref 4.2–5.3)
WBC # BLD AUTO: 11.4 K/UL (ref 4.6–13.2)

## 2019-08-22 PROCEDURE — 82962 GLUCOSE BLOOD TEST: CPT

## 2019-08-22 PROCEDURE — 74011000258 HC RX REV CODE- 258: Performed by: SURGERY

## 2019-08-22 PROCEDURE — 74011250636 HC RX REV CODE- 250/636: Performed by: SURGERY

## 2019-08-22 PROCEDURE — 74011636637 HC RX REV CODE- 636/637: Performed by: SURGERY

## 2019-08-22 PROCEDURE — 74011250636 HC RX REV CODE- 250/636: Performed by: NURSE PRACTITIONER

## 2019-08-22 PROCEDURE — 85025 COMPLETE CBC W/AUTO DIFF WBC: CPT

## 2019-08-22 RX ORDER — DEXTROSE MONOHYDRATE AND SODIUM CHLORIDE 5; .9 G/100ML; G/100ML
100 INJECTION, SOLUTION INTRAVENOUS CONTINUOUS
Status: DISCONTINUED | OUTPATIENT
Start: 2019-08-22 | End: 2019-08-22 | Stop reason: HOSPADM

## 2019-08-22 RX ADMIN — ONDANSETRON 6 MG: 2 SOLUTION INTRAMUSCULAR; INTRAVENOUS at 09:13

## 2019-08-22 RX ADMIN — DEXTROSE MONOHYDRATE AND SODIUM CHLORIDE 100 ML/HR: 5; .9 INJECTION, SOLUTION INTRAVENOUS at 01:36

## 2019-08-22 RX ADMIN — INSULIN GLARGINE 50 UNITS: 100 INJECTION, SOLUTION SUBCUTANEOUS at 09:00

## 2019-08-22 RX ADMIN — DIPHENHYDRAMINE HYDROCHLORIDE 25 MG: 50 INJECTION, SOLUTION INTRAMUSCULAR; INTRAVENOUS at 01:36

## 2019-08-22 RX ADMIN — KETOROLAC TROMETHAMINE 15 MG: 15 INJECTION, SOLUTION INTRAMUSCULAR; INTRAVENOUS at 05:52

## 2019-08-22 RX ADMIN — DIPHENHYDRAMINE HYDROCHLORIDE 25 MG: 50 INJECTION, SOLUTION INTRAMUSCULAR; INTRAVENOUS at 07:48

## 2019-08-22 NOTE — PROGRESS NOTES
Problem: Discharge Planning  Goal: *Discharge to safe environment  Outcome resolved/met    Pt dc'd to ltac level of care at [facility] at [facility]. bls transport set  With [facility] for 12 noon. Pt will transport with tpn infusing and ngt to suction. Care Management Interventions  PCP Verified by CM:  Yes  Palliative Care Criteria Met (RRAT>21 & CHF Dx)?: No  Mode of Transport at Discharge: BLS  Transition of Care Consult (CM Consult): LTAC  Discharge Durable Medical Equipment: No  Physical Therapy Consult: Yes  Occupational Therapy Consult: Yes  Current Support Network: Lives with Spouse  Confirm Follow Up Transport: Other (see comment)  Plan discussed with Pt/Family/Caregiver: Yes  Discharge Location  Discharge Placement: [facility] (LT[facility])

## 2019-08-22 NOTE — PROGRESS NOTES
Patient seen and examined. She is doing well. She actually had 3 different large bowel movements and she feels great. No fever. Her tachycardia has improved with the IV fluids. Ng tube output has decreased to 900 cc  BOBBY fistula drain is zero  Abdomen is soft and non-tender. I am really pleased with her progress. It is safe to discharge her today to rehab  I spoke today with Dr. Raul Betancourt and updated him on her situation. She will follow up with me in couple weeks.

## 2019-08-22 NOTE — PROGRESS NOTES
Problem: Diabetes Self-Management  Goal: *Disease process and treatment process  Description  Define diabetes and identify own type of diabetes; list 3 options for treating diabetes. Outcome: Progressing Towards Goal  Goal: *Incorporating nutritional management into lifestyle  Description  Describe effect of type, amount and timing of food on blood glucose; list 3 methods for planning meals. Outcome: Progressing Towards Goal  Goal: *Incorporating physical activity into lifestyle  Description  State effect of exercise on blood glucose levels. Outcome: Progressing Towards Goal  Goal: *Developing strategies to promote health/change behavior  Description  Define the ABC's of diabetes; identify appropriate screenings, schedule and personal plan for screenings. Outcome: Progressing Towards Goal  Goal: *Using medications safely  Description  State effect of diabetes medications on diabetes; name diabetes medication taking, action and side effects. Outcome: Progressing Towards Goal  Goal: *Monitoring blood glucose, interpreting and using results  Description  Identify recommended blood glucose targets  and personal targets. Outcome: Progressing Towards Goal  Goal: *Prevention, detection, treatment of acute complications  Description  List symptoms of hyper- and hypoglycemia; describe how to treat low blood sugar and actions for lowering  high blood glucose level. Outcome: Progressing Towards Goal  Goal: *Prevention, detection and treatment of chronic complications  Description  Define the natural course of diabetes and describe the relationship of blood glucose levels to long term complications of diabetes.   Outcome: Progressing Towards Goal  Goal: *Developing strategies to address psychosocial issues  Description  Describe feelings about living with diabetes; identify support needed and support network  Outcome: Progressing Towards Goal  Goal: *Sick day guidelines  Outcome: Progressing Towards Goal  Goal: *Patient Specific Goal (EDIT GOAL, INSERT TEXT)  Outcome: Progressing Towards Goal     Problem: Patient Education: Go to Patient Education Activity  Goal: Patient/Family Education  Outcome: Progressing Towards Goal     Problem: Falls - Risk of  Goal: *Absence of Falls  Description  Document Corinne Girt Fall Risk and appropriate interventions in the flowsheet. Outcome: Progressing Towards Goal  Note:   Fall Risk Interventions:  Mobility Interventions: Patient to call before getting OOB    Mentation Interventions: Door open when patient unattended, Room close to nurse's station    Medication Interventions: Patient to call before getting OOB, Teach patient to arise slowly    Elimination Interventions: Call light in reach, Elevated toilet seat, Patient to call for help with toileting needs, Toileting schedule/hourly rounds              Problem: Patient Education: Go to Patient Education Activity  Goal: Patient/Family Education  Outcome: Progressing Towards Goal     Problem: Discharge Planning  Goal: *Discharge to safe environment  Outcome: Progressing Towards Goal     Problem: Pressure Injury - Risk of  Goal: *Prevention of pressure injury  Description  Document Cristi Scale and appropriate interventions in the flowsheet.   Outcome: Progressing Towards Goal  Note:   Pressure Injury Interventions:  Sensory Interventions: Assess changes in LOC    Moisture Interventions: Absorbent underpads    Activity Interventions: Increase time out of bed    Mobility Interventions: HOB 30 degrees or less, Pressure redistribution bed/mattress (bed type)    Nutrition Interventions: Document food/fluid/supplement intake    Friction and Shear Interventions: HOB 30 degrees or less                Problem: Patient Education: Go to Patient Education Activity  Goal: Patient/Family Education  Outcome: Progressing Towards Goal     Problem: Pain  Goal: *Control of Pain  Outcome: Progressing Towards Goal     Problem: Patient Education: Go to Patient Education Activity  Goal: Patient/Family Education  Outcome: Progressing Towards Goal     Problem: Patient Education: Go to Patient Education Activity  Goal: Patient/Family Education  Outcome: Progressing Towards Goal     Problem: Surgical Pathway Post-Op Day 2 through Discharge  Goal: Off Pathway (Use only if patient is Off Pathway)  Outcome: Progressing Towards Goal  Goal: Activity/Safety  Outcome: Progressing Towards Goal  Goal: Nutrition/Diet  Outcome: Progressing Towards Goal  Goal: Discharge Planning  Outcome: Progressing Towards Goal  Goal: Medications  Outcome: Progressing Towards Goal  Goal: Respiratory  Outcome: Progressing Towards Goal  Goal: Treatments/Interventions/Procedures  Outcome: Progressing Towards Goal  Goal: Psychosocial  Outcome: Progressing Towards Goal  Goal: *No signs and symptoms of infection or wound complications  Outcome: Progressing Towards Goal  Goal: *Optimal pain control at patient's stated goal  Outcome: Progressing Towards Goal  Goal: *Adequate urinary output (equal to or greater than 30 milliliters/hour)  Outcome: Progressing Towards Goal  Goal: *Hemodynamically stable  Outcome: Progressing Towards Goal  Goal: *Tolerating diet  Outcome: Progressing Towards Goal  Goal: *Demonstrates progressive activity  Outcome: Progressing Towards Goal  Goal: *Lungs clear or at baseline  Outcome: Progressing Towards Goal     Problem: Surgical Pathway: Discharge Outcomes  Goal: *Hemodynamically stable  Outcome: Progressing Towards Goal  Goal: *Lungs clear or at baseline  Outcome: Progressing Towards Goal  Goal: *Demonstrates independent activity or return to baseline  Outcome: Progressing Towards Goal  Goal: *Optimal pain control at patient's stated goal  Outcome: Progressing Towards Goal  Goal: *Verbalizes understanding and describes prescribed diet  Outcome: Progressing Towards Goal  Goal: *Tolerating diet  Outcome: Progressing Towards Goal  Goal: *Verbalizes name, dosage, time, side effects, and number of days to continue medications  Outcome: Progressing Towards Goal  Goal: *No signs and symptoms of infection or wound complications  Outcome: Progressing Towards Goal  Goal: *Anxiety reduced or absent  Outcome: Progressing Towards Goal  Goal: *Understands and describes signs and symptoms to report to providers(Stroke Metric)  Outcome: Progressing Towards Goal  Goal: *Describes follow-up/return visits to physicians  Outcome: Progressing Towards Goal  Goal: *Describes available resources and support systems  Outcome: Progressing Towards Goal     Problem: Nutrition Deficit  Goal: *Optimize nutritional status  Outcome: Progressing Towards Goal     Problem: Diabetes Maintenance:Admission  Goal: *Blood glucose 80 to 180 mg/dl  Outcome: Progressing Towards Goal     Problem: Nutrition Deficit  Goal: *Optimize nutritional status  Outcome: Progressing Towards Goal     Problem: Patient Education: Go to Patient Education Activity  Goal: Patient/Family Education  Outcome: Progressing Towards Goal     Problem: Patient Education: Go to Patient Education Activity  Goal: Patient/Family Education  Outcome: Progressing Towards Goal

## 2019-08-22 NOTE — PROGRESS NOTES
Bedside and Verbal shift change report given to Bucky Cohen RN (oncoming nurse) by Tammi Montoya RN (offgoing nurse). Report included the following information SBAR, Kardex, Procedure Summary, Intake/Output and MAR.

## 2019-08-22 NOTE — PROGRESS NOTES
Transportation at Discharge:  8/22/19    Transport Company/Representative: Angely Orozcoy / [employer]  Transportation Phone number:[phone]  Method of Transport:  Jemima Carr / [employer]    Estimated pick-up time: 12N  Destination: [facility] @  [address]: [place]  Authorization: [id number]    Requesting Outcomes Manager: Jessica Kumar, Care- ext 1977

## 2019-08-22 NOTE — DISCHARGE INSTRUCTIONS
Discharge Instructions Following Surgery    Patient: Gayla Rangel MRN: [id number]  SSN: [id number]    YOB: 1960  Age: 61 y.o. Sex: female      Activity  · As tolerated, walking encourage, stairs are okay. · You may shower. Diet  · NPO except for sips and chips. NG tube to stay at all time to low intermittent suction please. Drain: Drain to be on bulb suction    Pain  · Take pain medication as directed by your doctor. Avoid narcotics    Appointment date/time Follow-Up Phone Calls    · Call the office at [phone] to make your follow-up appointment in 2 weeks after the surgery. Will also communicate with the nursing home team.     Dr. Svetlana Simon cell phone number is [phone]. Please call me if you have any concerns or questions. Patient armband removed and shredded    DISCHARGE SUMMARY from Nurse    PATIENT INSTRUCTIONS:    After general anesthesia or intravenous sedation, for 24 hours or while taking prescription Narcotics:  · Limit your activities  · Do not drive and operate hazardous machinery  · Do not make important personal or business decisions  · Do  not drink alcoholic beverages  · If you have not urinated within 8 hours after discharge, please contact your surgeon on call. Report the following to your surgeon:  · Excessive pain, swelling, redness or odor of or around the surgical area  · Temperature over 100.5  · Nausea and vomiting lasting longer than 4 hours or if unable to take medications  · Any signs of decreased circulation or nerve impairment to extremity: change in color, persistent  numbness, tingling, coldness or increase pain  · Any questions    What to do at Home:  Recommended activity: Activity as tolerated    *  Please give a list of your current medications to your Primary Care Provider.     *  Please update this list whenever your medications are discontinued, doses are      changed, or new medications (including over-the-counter products) are added.    *  Please carry medication information at all times in case of emergency situations. These are general instructions for a healthy lifestyle:    No smoking/ No tobacco products/ Avoid exposure to second hand smoke  Surgeon General's Warning:  Quitting smoking now greatly reduces serious risk to your health. Obesity, smoking, and sedentary lifestyle greatly increases your risk for illness    A healthy diet, regular physical exercise & weight monitoring are important for maintaining a healthy lifestyle    You may be retaining fluid if you have a history of heart failure or if you experience any of the following symptoms:  Weight gain of 3 pounds or more overnight or 5 pounds in a week, increased swelling in our hands or feet or shortness of breath while lying flat in bed. Please call your doctor as soon as you notice any of these symptoms; do not wait until your next office visit. The discharge information has been reviewed with the patient and spouse. The patient and spouse verbalized understanding. Discharge medications reviewed with the patient and spouse and appropriate educational materials and side effects teaching were provided.   ___________________________________________________________________________________________________________________________________

## 2019-08-22 NOTE — PROGRESS NOTES
Problem: Diabetes Self-Management  Goal: *Disease process and treatment process  Description  Define diabetes and identify own type of diabetes; list 3 options for treating diabetes. Outcome: Progressing Towards Goal  Goal: *Incorporating nutritional management into lifestyle  Description  Describe effect of type, amount and timing of food on blood glucose; list 3 methods for planning meals. Outcome: Progressing Towards Goal  Goal: *Incorporating physical activity into lifestyle  Description  State effect of exercise on blood glucose levels. Outcome: Progressing Towards Goal  Goal: *Developing strategies to promote health/change behavior  Description  Define the ABC's of diabetes; identify appropriate screenings, schedule and personal plan for screenings. Outcome: Progressing Towards Goal  Goal: *Using medications safely  Description  State effect of diabetes medications on diabetes; name diabetes medication taking, action and side effects. Outcome: Progressing Towards Goal  Goal: *Monitoring blood glucose, interpreting and using results  Description  Identify recommended blood glucose targets  and personal targets. Outcome: Progressing Towards Goal  Goal: *Prevention, detection, treatment of acute complications  Description  List symptoms of hyper- and hypoglycemia; describe how to treat low blood sugar and actions for lowering  high blood glucose level. Outcome: Progressing Towards Goal  Goal: *Prevention, detection and treatment of chronic complications  Description  Define the natural course of diabetes and describe the relationship of blood glucose levels to long term complications of diabetes.   Outcome: Progressing Towards Goal  Goal: *Developing strategies to address psychosocial issues  Description  Describe feelings about living with diabetes; identify support needed and support network  Outcome: Progressing Towards Goal  Goal: *Sick day guidelines  Outcome: Progressing Towards Goal  Goal: *Patient Specific Goal (EDIT GOAL, INSERT TEXT)  Outcome: Progressing Towards Goal     Problem: Patient Education: Go to Patient Education Activity  Goal: Patient/Family Education  Outcome: Progressing Towards Goal     Problem: Falls - Risk of  Goal: *Absence of Falls  Description  Document Miller Fells Fall Risk and appropriate interventions in the flowsheet. Outcome: Progressing Towards Goal  Note:   Fall Risk Interventions:  Mobility Interventions: Patient to call before getting OOB    Mentation Interventions: Door open when patient unattended, Room close to nurse's station    Medication Interventions: Patient to call before getting OOB, Teach patient to arise slowly    Elimination Interventions: Call light in reach, Elevated toilet seat, Patient to call for help with toileting needs, Toileting schedule/hourly rounds              Problem: Patient Education: Go to Patient Education Activity  Goal: Patient/Family Education  Outcome: Progressing Towards Goal     Problem: Discharge Planning  Goal: *Discharge to safe environment  Outcome: Progressing Towards Goal     Problem: Pressure Injury - Risk of  Goal: *Prevention of pressure injury  Description  Document Cristi Scale and appropriate interventions in the flowsheet.   Outcome: Progressing Towards Goal  Note:   Pressure Injury Interventions:  Sensory Interventions: Assess changes in LOC    Moisture Interventions: Absorbent underpads    Activity Interventions: Increase time out of bed    Mobility Interventions: HOB 30 degrees or less, Pressure redistribution bed/mattress (bed type)    Nutrition Interventions: Document food/fluid/supplement intake    Friction and Shear Interventions: HOB 30 degrees or less                Problem: Patient Education: Go to Patient Education Activity  Goal: Patient/Family Education  Outcome: Progressing Towards Goal     Problem: Pain  Goal: *Control of Pain  Outcome: Progressing Towards Goal     Problem: Patient Education: Go to Patient Education Activity  Goal: Patient/Family Education  Outcome: Progressing Towards Goal     Problem: Patient Education: Go to Patient Education Activity  Goal: Patient/Family Education  Outcome: Progressing Towards Goal     Problem: Surgical Pathway Post-Op Day 2 through Discharge  Goal: Off Pathway (Use only if patient is Off Pathway)  Outcome: Progressing Towards Goal  Goal: Activity/Safety  Outcome: Progressing Towards Goal  Goal: Nutrition/Diet  Outcome: Progressing Towards Goal  Goal: Discharge Planning  Outcome: Progressing Towards Goal  Goal: Medications  Outcome: Progressing Towards Goal  Goal: Respiratory  Outcome: Progressing Towards Goal  Goal: Treatments/Interventions/Procedures  Outcome: Progressing Towards Goal  Goal: Psychosocial  Outcome: Progressing Towards Goal  Goal: *No signs and symptoms of infection or wound complications  Outcome: Progressing Towards Goal  Goal: *Optimal pain control at patient's stated goal  Outcome: Progressing Towards Goal  Goal: *Adequate urinary output (equal to or greater than 30 milliliters/hour)  Outcome: Progressing Towards Goal  Goal: *Hemodynamically stable  Outcome: Progressing Towards Goal  Goal: *Tolerating diet  Outcome: Progressing Towards Goal  Goal: *Demonstrates progressive activity  Outcome: Progressing Towards Goal  Goal: *Lungs clear or at baseline  Outcome: Progressing Towards Goal     Problem: Surgical Pathway: Discharge Outcomes  Goal: *Hemodynamically stable  Outcome: Progressing Towards Goal  Goal: *Lungs clear or at baseline  Outcome: Progressing Towards Goal  Goal: *Demonstrates independent activity or return to baseline  Outcome: Progressing Towards Goal  Goal: *Optimal pain control at patient's stated goal  Outcome: Progressing Towards Goal  Goal: *Verbalizes understanding and describes prescribed diet  Outcome: Progressing Towards Goal  Goal: *Tolerating diet  Outcome: Progressing Towards Goal  Goal: *Verbalizes name, dosage, time, side effects, and number of days to continue medications  Outcome: Progressing Towards Goal  Goal: *No signs and symptoms of infection or wound complications  Outcome: Progressing Towards Goal  Goal: *Anxiety reduced or absent  Outcome: Progressing Towards Goal  Goal: *Understands and describes signs and symptoms to report to providers(Stroke Metric)  Outcome: Progressing Towards Goal  Goal: *Describes follow-up/return visits to physicians  Outcome: Progressing Towards Goal  Goal: *Describes available resources and support systems  Outcome: Progressing Towards Goal     Problem: Nutrition Deficit  Goal: *Optimize nutritional status  Outcome: Progressing Towards Goal     Problem: Diabetes Maintenance:Admission  Goal: *Blood glucose 80 to 180 mg/dl  Outcome: Progressing Towards Goal     Problem: Nutrition Deficit  Goal: *Optimize nutritional status  Outcome: Progressing Towards Goal     Problem: Patient Education: Go to Patient Education Activity  Goal: Patient/Family Education  Outcome: Progressing Towards Goal     Problem: Patient Education: Go to Patient Education Activity  Goal: Patient/Family Education  Outcome: Progressing Towards Goal

## 2019-09-17 ENCOUNTER — TELEPHONE (OUTPATIENT)
Dept: SURGERY | Age: 59
End: 2019-09-17

## 2019-09-18 DIAGNOSIS — M17.12 PRIMARY OSTEOARTHRITIS OF LEFT KNEE: ICD-10-CM

## 2019-09-18 RX ORDER — CYCLOBENZAPRINE HCL 10 MG
10 TABLET ORAL
Qty: 20 TAB | Refills: 0 | Status: SHIPPED | OUTPATIENT
Start: 2019-09-18 | End: 2019-10-22 | Stop reason: SDUPTHER

## 2019-09-18 NOTE — TELEPHONE ENCOUNTER
Requested Prescriptions     Pending Prescriptions Disp Refills    cyclobenzaprine (FLEXERIL) 10 mg tablet 20 Tab 0     Sig: Take 1 Tab by mouth three (3) times daily as needed for Muscle Spasm(s). Patient had surgery 9/1/19 but just got out of hospital yesterday 9/17/19 but has ran out of medication. Please advise, thank you.

## 2019-09-18 NOTE — TELEPHONE ENCOUNTER
Received called in regard to medical supply order. [id number] [facility] calling to see if they faxed over order for medical supply if Dr. Nacho Troy would sign. Gave fax number and I notified that we would have Dr. Nacho Troy review and if he agrees, sign and fax back. Verbal understanding was given. Closing encounter.

## 2019-09-19 ENCOUNTER — APPOINTMENT (OUTPATIENT)
Dept: CT IMAGING | Age: 59
End: 2019-09-19
Attending: EMERGENCY MEDICINE
Payer: COMMERCIAL

## 2019-09-19 ENCOUNTER — HOSPITAL ENCOUNTER (EMERGENCY)
Age: 59
Discharge: HOME | End: 2019-09-20
Attending: EMERGENCY MEDICINE | Admitting: EMERGENCY MEDICINE
Payer: COMMERCIAL

## 2019-09-19 VITALS
TEMPERATURE: 98.7 F | OXYGEN SATURATION: 97 % | SYSTOLIC BLOOD PRESSURE: 128 MMHG | DIASTOLIC BLOOD PRESSURE: 75 MMHG | RESPIRATION RATE: 13 BRPM | HEART RATE: 92 BPM | HEIGHT: 64 IN | BODY MASS INDEX: 23.56 KG/M2 | WEIGHT: 138 LBS

## 2019-09-19 DIAGNOSIS — Z90.49 S/P SMALL BOWEL RESECTION: Primary | ICD-10-CM

## 2019-09-19 DIAGNOSIS — R10.84 ABDOMINAL PAIN, GENERALIZED: ICD-10-CM

## 2019-09-19 LAB
ALBUMIN SERPL-MCNC: 2.9 G/DL (ref 3.4–5)
ALBUMIN/GLOB SERPL: 0.6 {RATIO} (ref 0.8–1.7)
ALP SERPL-CCNC: 161 U/L (ref 45–117)
ALT SERPL-CCNC: 40 U/L (ref 13–56)
ANION GAP SERPL CALC-SCNC: 6 MMOL/L (ref 3–18)
APPEARANCE UR: CLEAR
AST SERPL-CCNC: 29 U/L (ref 10–38)
BASOPHILS # BLD: 0 K/UL (ref 0–0.1)
BASOPHILS NFR BLD: 0 % (ref 0–2)
BILIRUB SERPL-MCNC: 0.2 MG/DL (ref 0.2–1)
BILIRUB UR QL: NEGATIVE
BUN SERPL-MCNC: 11 MG/DL (ref 7–18)
BUN/CREAT SERPL: 12 (ref 12–20)
CALCIUM SERPL-MCNC: 9.1 MG/DL (ref 8.5–10.1)
CHLORIDE SERPL-SCNC: 101 MMOL/L (ref 100–111)
CO2 SERPL-SCNC: 28 MMOL/L (ref 21–32)
COLOR UR: YELLOW
CREAT SERPL-MCNC: 0.92 MG/DL (ref 0.6–1.3)
DIFFERENTIAL METHOD BLD: ABNORMAL
EOSINOPHIL # BLD: 0.2 K/UL (ref 0–0.4)
EOSINOPHIL NFR BLD: 4 % (ref 0–5)
ERYTHROCYTE [DISTWIDTH] IN BLOOD BY AUTOMATED COUNT: 16.6 % (ref 11.6–14.5)
GLOBULIN SER CALC-MCNC: 4.8 G/DL (ref 2–4)
GLUCOSE SERPL-MCNC: 179 MG/DL (ref 74–99)
GLUCOSE UR STRIP.AUTO-MCNC: NEGATIVE MG/DL
HCT VFR BLD AUTO: 37.5 % (ref 35–45)
HGB BLD-MCNC: 11.8 G/DL (ref 12–16)
HGB UR QL STRIP: NEGATIVE
KETONES UR QL STRIP.AUTO: NEGATIVE MG/DL
LACTATE BLD-SCNC: 1.67 MMOL/L (ref 0.4–2)
LEUKOCYTE ESTERASE UR QL STRIP.AUTO: NEGATIVE
LIPASE SERPL-CCNC: 66 U/L (ref 73–393)
LYMPHOCYTES # BLD: 1.9 K/UL (ref 0.9–3.6)
LYMPHOCYTES NFR BLD: 30 % (ref 21–52)
MCH RBC QN AUTO: 27.7 PG (ref 24–34)
MCHC RBC AUTO-ENTMCNC: 31.5 G/DL (ref 31–37)
MCV RBC AUTO: 88 FL (ref 74–97)
MONOCYTES # BLD: 0.6 K/UL (ref 0.05–1.2)
MONOCYTES NFR BLD: 9 % (ref 3–10)
NEUTS SEG # BLD: 3.6 K/UL (ref 1.8–8)
NEUTS SEG NFR BLD: 57 % (ref 40–73)
NITRITE UR QL STRIP.AUTO: NEGATIVE
PH UR STRIP: 6.5 [PH] (ref 5–8)
PLATELET # BLD AUTO: 388 K/UL (ref 135–420)
PMV BLD AUTO: 10.4 FL (ref 9.2–11.8)
POTASSIUM SERPL-SCNC: 3.8 MMOL/L (ref 3.5–5.5)
PROT SERPL-MCNC: 7.7 G/DL (ref 6.4–8.2)
PROT UR STRIP-MCNC: NEGATIVE MG/DL
RBC # BLD AUTO: 4.26 M/UL (ref 4.2–5.3)
SODIUM SERPL-SCNC: 135 MMOL/L (ref 136–145)
SP GR UR REFRACTOMETRY: 1.01 (ref 1–1.03)
UROBILINOGEN UR QL STRIP.AUTO: 0.2 EU/DL (ref 0.2–1)
WBC # BLD AUTO: 6.4 K/UL (ref 4.6–13.2)

## 2019-09-19 PROCEDURE — 85025 COMPLETE CBC W/AUTO DIFF WBC: CPT

## 2019-09-19 PROCEDURE — 83605 ASSAY OF LACTIC ACID: CPT

## 2019-09-19 PROCEDURE — 96372 THER/PROPH/DIAG INJ SC/IM: CPT

## 2019-09-19 PROCEDURE — 99285 EMERGENCY DEPT VISIT HI MDM: CPT

## 2019-09-19 PROCEDURE — 74011636320 HC RX REV CODE- 636/320: Performed by: EMERGENCY MEDICINE

## 2019-09-19 PROCEDURE — 74177 CT ABD & PELVIS W/CONTRAST: CPT

## 2019-09-19 PROCEDURE — 83690 ASSAY OF LIPASE: CPT

## 2019-09-19 PROCEDURE — 74011250636 HC RX REV CODE- 250/636: Performed by: EMERGENCY MEDICINE

## 2019-09-19 PROCEDURE — 81003 URINALYSIS AUTO W/O SCOPE: CPT

## 2019-09-19 PROCEDURE — 96375 TX/PRO/DX INJ NEW DRUG ADDON: CPT

## 2019-09-19 PROCEDURE — 96374 THER/PROPH/DIAG INJ IV PUSH: CPT

## 2019-09-19 PROCEDURE — 80053 COMPREHEN METABOLIC PANEL: CPT

## 2019-09-19 PROCEDURE — 96376 TX/PRO/DX INJ SAME DRUG ADON: CPT

## 2019-09-19 PROCEDURE — 93005 ELECTROCARDIOGRAM TRACING: CPT

## 2019-09-19 RX ORDER — FENTANYL CITRATE 50 UG/ML
75 INJECTION, SOLUTION INTRAMUSCULAR; INTRAVENOUS
Status: COMPLETED | OUTPATIENT
Start: 2019-09-19 | End: 2019-09-19

## 2019-09-19 RX ORDER — ONDANSETRON 2 MG/ML
8 INJECTION INTRAMUSCULAR; INTRAVENOUS
Status: COMPLETED | OUTPATIENT
Start: 2019-09-19 | End: 2019-09-19

## 2019-09-19 RX ORDER — MORPHINE SULFATE 4 MG/ML
4 INJECTION, SOLUTION INTRAMUSCULAR; INTRAVENOUS
Status: COMPLETED | OUTPATIENT
Start: 2019-09-19 | End: 2019-09-19

## 2019-09-19 RX ADMIN — FENTANYL CITRATE 75 MCG: 50 INJECTION, SOLUTION INTRAMUSCULAR; INTRAVENOUS at 22:12

## 2019-09-19 RX ADMIN — MORPHINE SULFATE 4 MG: 4 INJECTION, SOLUTION INTRAMUSCULAR; INTRAVENOUS at 21:44

## 2019-09-19 RX ADMIN — FENTANYL CITRATE 75 MCG: 50 INJECTION, SOLUTION INTRAMUSCULAR; INTRAVENOUS at 23:55

## 2019-09-19 RX ADMIN — SODIUM CHLORIDE, SODIUM LACTATE, POTASSIUM CHLORIDE, AND CALCIUM CHLORIDE 1000 ML: 600; 310; 30; 20 INJECTION, SOLUTION INTRAVENOUS at 21:29

## 2019-09-19 RX ADMIN — IOPAMIDOL 69 ML: 612 INJECTION, SOLUTION INTRAVENOUS at 21:02

## 2019-09-19 RX ADMIN — ONDANSETRON 8 MG: 2 INJECTION INTRAMUSCULAR; INTRAVENOUS at 20:27

## 2019-09-19 RX ADMIN — MORPHINE SULFATE 4 MG: 4 INJECTION, SOLUTION INTRAMUSCULAR; INTRAVENOUS at 20:27

## 2019-09-19 NOTE — ED TRIAGE NOTES
Just released from hospital Tuesday since beginning of august for bowel obstruction. Started PT at home.  Developed sharp abdominal pain at [address] [place] [address] site

## 2019-09-19 NOTE — PROGRESS NOTES
Spoke with radiology tech. I informed the radiology tech that I would like this CT scan of the abdomen pelvis irrespective of glomerular filtration rate. This is because the patient is very high risk and appears very ill at this time. I discussed this with the patient who agrees.

## 2019-09-19 NOTE — ED PROVIDER NOTES
EMERGENCY DEPARTMENT HISTORY AND PHYSICAL EXAM      Date: 9/19/2019  Patient Name: Teo Puente    History of Presenting Illness     Chief Complaint   Patient presents with    Abdominal Pain       History (Context): Teo Puente is a 61 y.o. Naeem Labor with multiple chronic comorbid conditions as noted below in the past medical history, but who had 2 recent surgeries, the first an elective removal of an adnexal mass with lysis of adhesions, which was further complicated by small bowel obstruction requiring additional intervention, complicated by bowel leak and enterocutaneous fistula, who was discharged home 2 days ago after a short rehab stay, who presents with 1 day of progressive, subacute onset, severe, diffuse abdominal pain without exacerbating/relieving features or other associated symptoms. The patient seems to think that after she had performed physical therapy with her legs, the patient started having the symptoms. On review of systems, the patient denies fever, chills, nausea, vomiting, back pain, chest pain, shortness of breath, diaphoresis, rashes, tick bite, recent travel. Endorses diarrhea    PCP: Bruno Harden MD    Current Outpatient Medications   Medication Sig Dispense Refill    cyclobenzaprine (FLEXERIL) 10 mg tablet Take 1 Tab by mouth three (3) times daily as needed for Muscle Spasm(s). 20 Tab 0    metFORMIN (GLUMETZA ER) 500 mg TG24 24 hour tablet Take 1,000 mg by mouth two (2) times a day. take 1 tablet by mouth twice a day 120 Tab 11    multivitamin (ONE A DAY) tablet Take 1 Tab by mouth daily.  ondansetron hcl (ZOFRAN) 4 mg tablet Take 1 Tab by mouth every eight (8) hours as needed for Nausea. 12 Tab 0    ALLERGY RELIEF, CETIRIZINE, 10 mg tablet take 1 tablet by mouth once daily 90 Tab 3    bisoprolol-hydroCHLOROthiazide (ZIAC) 2.5-6.25 mg per tablet Take 1 Tab by mouth daily. 90 Tab 3    doxepin (SINEQUAN) 10 mg capsule Take 1 Cap by mouth nightly. 30 Cap 11    omeprazole (PRILOSEC) 10 mg capsule take 1 capsule by mouth once daily 30 Cap 11    FARXIGA 10 mg tab tablet take 1 tablet by mouth once daily 90 Tab 3    amLODIPine (NORVASC) 2.5 mg tablet take 1 tablet by mouth once daily NIGHTLY 90 Tab 1    albuterol (PROVENTIL HFA, VENTOLIN HFA, PROAIR HFA) 90 mcg/actuation inhaler Take 1 Puff by inhalation every six (6) hours as needed for Wheezing. 1 Inhaler 0    folic acid (FOLVITE) 1 mg tablet Take  by mouth daily.  methotrexate (RHEUMATREX) 2.5 mg tablet Take 7.5 mg by mouth every Tuesday.  sulfaSALAzine (AZULFIDINE) 500 mg tablet Take 500 mg by mouth two (2) times a day.       glucose blood VI test strips (ONETOUCH ULTRA TEST) strip Check blood glucose as directed 100 Strip 1       Past History     Past Medical History:  Past Medical History:   Diagnosis Date    Abdominal adhesions     Adnexal cyst 7/30/2019    Left    Anemia     Asthma     Chronic pelvic pain in female 7/30/2019    Diabetes mellitus     Dyskinesia     bilateral    Essential hypertension     GERD (gastroesophageal reflux disease)     Hypertension     Menopause     Microhematuria     Rheumatoid arteritis 2018    Stool color black        Past Surgical History:  Past Surgical History:   Procedure Laterality Date    COLONOSCOPY N/A 1/21/2019    COL[facility] ENDOSCOPY    HX CHOLECYSTECTOMY  6/28/10    HX COLONOSCOPY      HX ENDOSCOPY      HX HERNIA REPAIR      HX HYSTERECTOMY  1989    Fibroids    HX KNEE REPLACEMENT Left     HX KNEE REPLACEMENT Right 06/2018    HX OOPHORECTOMY  12/2000    HX ORTHOPAEDIC Right     ankle- multiple surgeries    HX SMALL BOWEL RESECTION  12/2000    HX SMALL BOWEL RESECTION  02/21/2017    Dr. Troy Mcgowan         Family History:  Family History   Problem Relation Age of Onset    Hypertension Other         parent    Breast Cancer Other 21    Heart Disease Father  Diabetes Father     Lung Disease Father     Diabetes Mother     Hypertension Other         sibling    Diabetes Other         parent    Kidney Disease Brother     Diabetes Brother     Lung Disease Brother        Social History:  Social History     Tobacco Use    Smoking status: Never Smoker    Smokeless tobacco: Never Used   Substance Use Topics    Alcohol use: No    Drug use: No       Allergies: Allergies   Allergen Reactions    Macrodantin [Nitrofurantoin Macrocrystalline] Itching and Other (comments)    Tape [Adhesive] Other (comments)     Paper tape-- feels like burning skin  Burned skin when had wound vac         Review of Systems   As per HPI, otherwise reviewed and negative. Physical Exam     Vitals:    09/19/19 2300 09/19/19 2315 09/19/19 2330 09/19/19 2350   BP: 125/81  117/78 128/75   Pulse: 93 89 94 92   Resp: 14 16 16 13   Temp:       SpO2: 99% 98% 99% 97%   Weight:       Height:           Gen: In clear pain  HEENT: Normocephalic, sclera anicteric  Cardiovascular: Normal rate, regular rhythm, no murmurs, rubs, gallops. Pulses intact and equal distally. Pulmonary: No respiratory distress. No stridor. Clear lungs. ABD: Soft, diffusely tender, rebound and guarding, BOBBY drain in place in the right lower quadrant, minimally draining nondistended. Neuro: Alert. Normal speech. Normal mentation. Psych: Normal thought content and thought processes. : No CVA tenderness  EXT: Moves all extremities well. No cyanosis or clubbing. Skin: Warm and well-perfused.         Diagnostic Study Results     Labs -     Recent Results (from the past 12 hour(s))   EKG, 12 LEAD, SUBSEQUENT    Collection Time: 09/19/19  8:16 PM   Result Value Ref Range    Ventricular Rate 83 BPM    Atrial Rate 83 BPM    P-R Interval 142 ms    QRS Duration 70 ms    Q-T Interval 372 ms    QTC Calculation (Bezet) 437 ms    Calculated P Axis 67 degrees    Calculated R Axis 79 degrees    Calculated T Axis 33 degrees Diagnosis       Normal sinus rhythm  Normal ECG  When compared with ECG of 10-AUG-2019 11:21,  Nonspecific T wave abnormality, improved in Inferior leads     URINALYSIS W/ RFLX MICROSCOPIC    Collection Time: 09/19/19 10:55 PM   Result Value Ref Range    Color YELLOW      Appearance CLEAR      Specific gravity 1.014 1.005 - 1.030      pH (UA) 6.5 5.0 - 8.0      Protein NEGATIVE  NEG mg/dL    Glucose NEGATIVE  NEG mg/dL    Ketone NEGATIVE  NEG mg/dL    Bilirubin NEGATIVE  NEG      Blood NEGATIVE  NEG      Urobilinogen 0.2 0.2 - 1.0 EU/dL    Nitrites NEGATIVE  NEG      Leukocyte Esterase NEGATIVE  NEG         Radiologic Studies -   CT ABD PELV W CONT    (Results Pending)     CT Results  (Last 48 hours)    None        CXR Results  (Last 48 hours)    None            Medical Decision Making   I am the first provider for this patient. I reviewed the vital signs, available nursing notes, past medical history, past surgical history, family history and social history. Vital Signs-Reviewed the patient's vital signs. EKG: Interpreted by myself. Records Reviewed: By myself personally on initial evaluation    MDM:   Patient presents with abdominal pain. Exam significant for diffuse tenderness with rebound and guarding. DDX considered: Intra-abdominal abscess, bowel perforation, SBO, C. difficile, Diego's, gastroparesis, gastroenteritis. DDX thought to be less likely but also considered due to high risk condition: Cholangitis, mesenteric ischemia.     Plan:   Pain Control  Antiemetics  Close Observation    Orders as below:  Orders Placed This Encounter    CT ABD PELV W CONT    CBC WITH AUTOMATED DIFF    METABOLIC PANEL, COMPREHENSIVE    LIPASE    URINALYSIS W/ RFLX MICROSCOPIC    POC LACTIC ACID    POC LACTIC ACID    EKG, 12 LEAD, SUBSEQUENT    morphine injection 4 mg    ondansetron (ZOFRAN) injection 8 mg    lactated ringers bolus infusion 1,000 mL    iopamidol (ISOVUE 300) 61 % contrast injection 100 mL    morphine injection 4 mg    fentaNYL citrate (PF) injection 75 mcg    fentaNYL citrate (PF) injection 75 mcg        ED Course:   ED Course as of Sep 20 0715   Thu Sep 19, 2019   0845 Spoke with Dr. Satinder Esparza, who recommended the patient be discharged home and follow-up in his clinic. I spoke with the patient. She understands and agrees with this plan. [DT]      ED Course User Index  [DT] Mo Officer, MD         Disposition:  Discharge home    DISCHARGE NOTE:     Pt has been reexamined. Patient has no new complaints, changes, or physical findings. Care plan outlined and precautions discussed. Results were reviewed with the patient. All medications were reviewed with the patient; will d/c home with no changes to meds. All of pt's questions and concerns were addressed. Alarm symptoms and return precautions were discussed in detail with the patient. The patient demonstrates adequate understanding. Patient was instructed and agrees to follow up with general surgery, as well as to return to the ED upon further deterioration. Patient is ready to go home. The patient understands and agrees with this plan. Patient is very very happy with her care. Follow-up Information     Follow up With Specialties Details Why Contact Jerome Monson MD Family Practice, Internal Medicine  As needed, If symptoms worsen [address] [address] [phone]      [facility] EMERGENCY DEPT Emergency Medicine  As needed, If symptoms worsen [address] [facility] 51    Kerri Shah MD Surgery Call today  [id number] [facility] [address]  [address] [place] [address]  [phone]            Discharge Medication List as of 9/20/2019 12:29 AM              Diagnosis     Clinical Impression:   1. S/P small bowel resection    2.  Abdominal pain, generalized        Signed,  Shahida Bartlett MD  Emergency Physician  LYNN Hassan    As a voice dictation software was utilized to dictate this note, minor word transpositions can occur. I apologize for confusing wording and typographic errors. Please feel free to contact me for clarification.

## 2019-09-19 NOTE — ED NOTES
I performed a brief evaluation, including history and physical, of the patient here in triage and I have determined that pt will need further treatment and evaluation from the fast track ER physician. I have placed initial orders to help in expediting patients care.

## 2019-09-20 LAB
ATRIAL RATE: 83 BPM
CALCULATED P AXIS, ECG09: 67 DEGREES
CALCULATED R AXIS, ECG10: 79 DEGREES
CALCULATED T AXIS, ECG11: 33 DEGREES
DIAGNOSIS, 93000: NORMAL
P-R INTERVAL, ECG05: 142 MS
Q-T INTERVAL, ECG07: 372 MS
QRS DURATION, ECG06: 70 MS
QTC CALCULATION (BEZET), ECG08: 437 MS
VENTRICULAR RATE, ECG03: 83 BPM

## 2019-09-20 NOTE — PROGRESS NOTES
CT called and stated IV painful when flushed;unable to do CT at this time. Patient brought back from CT. Charge RN at bedside for ultrasound IV.

## 2019-09-20 NOTE — ED NOTES
I have reviewed discharge instructions with the patient. The patient verbalized understanding. Patient mother and  at bedside. Patient agreeable to d/c instructions and plans to f/u with Dr. Anibal Khalil, appointment already scheduled.

## 2019-09-20 NOTE — DISCHARGE INSTRUCTIONS

## 2019-09-20 NOTE — PROGRESS NOTES
RN at bedside. Patient in pain. Patient given warm blank for comfort. Patient providing surgery history. Attempts made to relax patient with therapeutic listening.

## 2019-09-23 ENCOUNTER — TELEPHONE (OUTPATIENT)
Dept: SURGERY | Age: 59
End: 2019-09-23

## 2019-09-23 NOTE — TELEPHONE ENCOUNTER
Received call from [facility] at [facility] family services. Stated that patient was sent home with BOBBY drain and that it was draining about 5 ml but now is draining more and has been draining around 20 ml. Stated that the contents are a tan creamy color and that it is very foul smelling. Stated that the odor is starting to \"smell up patients room\" even with the bulb closed. Stated that the patient has no fever but is complaining about pain at her incision site (Was seen in ER last week for the pain as well). [address] [place] [address] stated that the incision site looks great and that there is a bowel patch on there as well. Stated that patient has an appointment to see Dr. Clara Griffin on Wednesday and that they just want to make us aware. Verbal understanding given and I notified [facility] that I would make Dr. Clara Griffin aware and that if he wanted to do anything different that I could give them a return call. [address] [place] [address] gave me the number of [phone]. Dr. Clara Griffin was made aware and stated that he would keep everything as is and that he will evaluate patient on Thursday. Verbal understanding given.

## 2019-09-25 ENCOUNTER — CLINICAL SUPPORT (OUTPATIENT)
Dept: FAMILY MEDICINE CLINIC | Age: 59
End: 2019-09-25

## 2019-09-25 ENCOUNTER — OFFICE VISIT (OUTPATIENT)
Dept: SURGERY | Age: 59
End: 2019-09-25

## 2019-09-25 VITALS
HEIGHT: 64 IN | SYSTOLIC BLOOD PRESSURE: 150 MMHG | HEART RATE: 85 BPM | DIASTOLIC BLOOD PRESSURE: 95 MMHG | TEMPERATURE: 98.3 F | WEIGHT: 150 LBS | OXYGEN SATURATION: 99 % | RESPIRATION RATE: 16 BRPM | BODY MASS INDEX: 25.61 KG/M2

## 2019-09-25 VITALS
HEART RATE: 101 BPM | HEIGHT: 64 IN | BODY MASS INDEX: 25.61 KG/M2 | WEIGHT: 150 LBS | DIASTOLIC BLOOD PRESSURE: 105 MMHG | TEMPERATURE: 98 F | OXYGEN SATURATION: 98 % | SYSTOLIC BLOOD PRESSURE: 159 MMHG

## 2019-09-25 DIAGNOSIS — Z09 POSTOPERATIVE EXAMINATION: Primary | ICD-10-CM

## 2019-09-25 DIAGNOSIS — E11.65 TYPE 2 DIABETES MELLITUS WITH HYPERGLYCEMIA, WITHOUT LONG-TERM CURRENT USE OF INSULIN (HCC): Primary | ICD-10-CM

## 2019-09-25 LAB — GLUCOSE POC: 182 MG/DL

## 2019-09-25 NOTE — PROGRESS NOTES
Svetlana Velez is a 61 y.o. female (: 1960) presenting to address:    No chief complaint on file. Medication list and allergies have been reviewed with Svetlana Velez and updated as of today's date. I have gone over all Medical, Surgical and Social History with Svetlana Velez and updated/added the information accordingly. 1. Have you been to the ER, Urgent Care or Hospitalized since your last visit? YES. Last 19. Due to severe pain. 2. Have you followed up with your PCP or any other Physicians since your procedure/ last office visit? NO. Appt scheduled for next month.

## 2019-09-25 NOTE — PROGRESS NOTES
Altagracia Kaur is a 61 y.o female that is present in the office for a nurse visit for a blood glucose check. 1. Have you been to the ER, urgent care clinic since your last visit? Hospitalized since your last visit? Yes When: [facility] 08/10/19 - SBO and 9/19/19 - abdominal pain    2. Have you seen or consulted any other health care providers outside of the [address] [place] [address] since your last visit? Include any pap smears or colon screening.  No     Health Maintenance Due   Topic Date Due    DTaP/Tdap/Td series (1 - Tdap) 02/04/1981    PAP AKA CERVICAL CYTOLOGY  02/04/1981    Shingrix Vaccine Age 50> (1 of 2) 02/04/2010    EYE EXAM RETINAL OR DILATED  05/29/2019    FOOT EXAM Q1  06/12/2019    MICROALBUMIN Q1  06/12/2019    LIPID PANEL Q1  06/12/2019    Influenza Age 5 to Adult  08/01/2019

## 2019-09-25 NOTE — PROGRESS NOTES
Patient seen and examined. She is doing relatively well. I am really happy that her fistula has almost completely healed. She is passing flatus and having normal bowel movement and she is tolerating regular diet. Her red drain output is very minimal.  It has been less than 5 to 10 cc every day until yesterday while it drained 60 cc . So I decided to keep it for now and follow-up with me next Monday or Wednesday to decide if I should remove it.

## 2019-10-02 ENCOUNTER — OFFICE VISIT (OUTPATIENT)
Dept: SURGERY | Age: 59
End: 2019-10-02

## 2019-10-02 VITALS
SYSTOLIC BLOOD PRESSURE: 109 MMHG | HEART RATE: 96 BPM | WEIGHT: 147 LBS | HEIGHT: 64 IN | OXYGEN SATURATION: 97 % | TEMPERATURE: 97.5 F | BODY MASS INDEX: 25.1 KG/M2 | DIASTOLIC BLOOD PRESSURE: 70 MMHG

## 2019-10-02 DIAGNOSIS — Z09 POSTOPERATIVE EXAMINATION: Primary | ICD-10-CM

## 2019-10-02 NOTE — PROGRESS NOTES
Regina Ji is a 61 y.o. female (: 1960) presenting to address:    Chief Complaint   Patient presents with    Surgical Follow-up     SBO/ Poss BOBBY drain removal       Medication list and allergies have been reviewed with Regina Ji and updated as of today's date. I have gone over all Medical, Surgical and Social History with Regina Ji and updated/added the information accordingly. 1. Have you been to the ER, Urgent Care or Hospitalized since your last visit? NO      2. Have you followed up with your PCP or any other Physicians since your procedure/ last office visit?    NO

## 2019-10-02 NOTE — PROGRESS NOTES
Patient seen and examined. She is doing great she feels much better. Her abdomen is soft and non tender. Her BOBBY drain output is between 10 and 30 cc/day for the last 5 to 6 days. I explained to the patient and her family that I would like to keep it for 2 more weeks especially because it still slightly greenish.   Otherwise she can continue on the regular diet and continue with the physical therapy  Follow-up with me in 2 weeks

## 2019-10-03 ENCOUNTER — TELEPHONE (OUTPATIENT)
Dept: SURGERY | Age: 59
End: 2019-10-03

## 2019-10-03 NOTE — TELEPHONE ENCOUNTER
Incoming  Call from [address] [place] [address] Shiv [phone] with [facility] to f/u on appointment with surgeon the patient had yesterday. There was a concern about the odor of the drainage that was coming from her BOBBY drain. She wanted to verify that the was not a new development. Spoke with NP who had seen the patient in the hospital who verified that the odor had been present and due to the nature of the wound there will be an odor present. They also stated they will continue to got out to do checks on the BOBBY drain insertion site to look for development of irritation in the area. She also reports that the pt has been afebrile, and is eating well. She was also discharged from PT today. She will follow up with Dr. Song Purvis in the office in 2 weeks or sooner if needed.

## 2019-10-04 ENCOUNTER — HOSPITAL ENCOUNTER (EMERGENCY)
Age: 59
Discharge: HOME | End: 2019-10-04
Attending: EMERGENCY MEDICINE | Admitting: EMERGENCY MEDICINE
Payer: COMMERCIAL

## 2019-10-04 ENCOUNTER — TELEPHONE (OUTPATIENT)
Dept: SURGERY | Age: 59
End: 2019-10-04

## 2019-10-04 ENCOUNTER — APPOINTMENT (OUTPATIENT)
Dept: CT IMAGING | Age: 59
End: 2019-10-04
Attending: EMERGENCY MEDICINE
Payer: COMMERCIAL

## 2019-10-04 VITALS
TEMPERATURE: 98.3 F | DIASTOLIC BLOOD PRESSURE: 82 MMHG | RESPIRATION RATE: 18 BRPM | WEIGHT: 147 LBS | SYSTOLIC BLOOD PRESSURE: 124 MMHG | HEART RATE: 103 BPM | OXYGEN SATURATION: 98 % | HEIGHT: 64 IN | BODY MASS INDEX: 25.1 KG/M2

## 2019-10-04 DIAGNOSIS — K59.00 CONSTIPATION, UNSPECIFIED CONSTIPATION TYPE: ICD-10-CM

## 2019-10-04 DIAGNOSIS — R10.31 RIGHT LOWER QUADRANT ABDOMINAL PAIN: Primary | ICD-10-CM

## 2019-10-04 LAB
ANION GAP SERPL CALC-SCNC: 11 MMOL/L (ref 3–18)
BASOPHILS # BLD: 0 K/UL (ref 0–0.1)
BASOPHILS # BLD: NORMAL K/UL
BASOPHILS NFR BLD: 0 % (ref 0–2)
BASOPHILS NFR BLD: NORMAL %
BUN SERPL-MCNC: 16 MG/DL (ref 7–18)
BUN/CREAT SERPL: 17 (ref 12–20)
CALCIUM SERPL-MCNC: 10.1 MG/DL (ref 8.5–10.1)
CHLORIDE SERPL-SCNC: 101 MMOL/L (ref 100–111)
CO2 SERPL-SCNC: 26 MMOL/L (ref 21–32)
CREAT SERPL-MCNC: 0.92 MG/DL (ref 0.6–1.3)
DIFFERENTIAL METHOD BLD: NORMAL
DIFFERENTIAL METHOD BLD: NORMAL
EOSINOPHIL # BLD: 0.2 K/UL (ref 0–0.4)
EOSINOPHIL # BLD: NORMAL K/UL
EOSINOPHIL NFR BLD: 3 % (ref 0–5)
EOSINOPHIL NFR BLD: NORMAL %
ERYTHROCYTE [DISTWIDTH] IN BLOOD BY AUTOMATED COUNT: 15.9 % (ref 11.6–14.5)
ERYTHROCYTE [DISTWIDTH] IN BLOOD BY AUTOMATED COUNT: NORMAL %
GLUCOSE SERPL-MCNC: 126 MG/DL (ref 74–99)
HCT VFR BLD AUTO: 37.2 % (ref 35–45)
HCT VFR BLD AUTO: NORMAL %
HGB BLD-MCNC: 11.6 G/DL (ref 12–16)
HGB BLD-MCNC: NORMAL G/DL
INR PPP: 0.8 (ref 0.8–1.2)
LYMPHOCYTES # BLD: 1.8 K/UL (ref 0.9–3.6)
LYMPHOCYTES # BLD: NORMAL K/UL
LYMPHOCYTES NFR BLD: 30 % (ref 21–52)
LYMPHOCYTES NFR BLD: NORMAL %
MCH RBC QN AUTO: 27 PG (ref 24–34)
MCH RBC QN AUTO: NORMAL PG
MCHC RBC AUTO-ENTMCNC: 31.2 G/DL (ref 31–37)
MCHC RBC AUTO-ENTMCNC: NORMAL G/DL
MCV RBC AUTO: 86.7 FL (ref 74–97)
MCV RBC AUTO: NORMAL FL
MONOCYTES # BLD: 0.6 K/UL (ref 0.05–1.2)
MONOCYTES # BLD: NORMAL K/UL
MONOCYTES NFR BLD: 9 % (ref 3–10)
MONOCYTES NFR BLD: NORMAL %
NEUTS SEG # BLD: 3.5 K/UL (ref 1.8–8)
NEUTS SEG # BLD: NORMAL K/UL
NEUTS SEG NFR BLD: 58 % (ref 40–73)
NEUTS SEG NFR BLD: NORMAL %
PLATELET # BLD AUTO: 280 K/UL (ref 135–420)
PLATELET # BLD AUTO: NORMAL K/UL
PMV BLD AUTO: 9.6 FL (ref 9.2–11.8)
PMV BLD AUTO: NORMAL FL
POTASSIUM SERPL-SCNC: 3.8 MMOL/L (ref 3.5–5.5)
PROTHROMBIN TIME: 11 SEC (ref 11.5–15.2)
RBC # BLD AUTO: 4.29 M/UL (ref 4.2–5.3)
RBC # BLD AUTO: NORMAL M/UL
SODIUM SERPL-SCNC: 138 MMOL/L (ref 136–145)
WBC # BLD AUTO: 6 K/UL (ref 4.6–13.2)
WBC # BLD AUTO: NORMAL K/UL

## 2019-10-04 PROCEDURE — 85025 COMPLETE CBC W/AUTO DIFF WBC: CPT

## 2019-10-04 PROCEDURE — 74011250636 HC RX REV CODE- 250/636: Performed by: EMERGENCY MEDICINE

## 2019-10-04 PROCEDURE — 80048 BASIC METABOLIC PNL TOTAL CA: CPT

## 2019-10-04 PROCEDURE — 74177 CT ABD & PELVIS W/CONTRAST: CPT

## 2019-10-04 PROCEDURE — 96374 THER/PROPH/DIAG INJ IV PUSH: CPT

## 2019-10-04 PROCEDURE — 85027 COMPLETE CBC AUTOMATED: CPT

## 2019-10-04 PROCEDURE — 96376 TX/PRO/DX INJ SAME DRUG ADON: CPT

## 2019-10-04 PROCEDURE — 99284 EMERGENCY DEPT VISIT MOD MDM: CPT

## 2019-10-04 PROCEDURE — 85610 PROTHROMBIN TIME: CPT

## 2019-10-04 PROCEDURE — 74011636320 HC RX REV CODE- 636/320: Performed by: EMERGENCY MEDICINE

## 2019-10-04 PROCEDURE — 96375 TX/PRO/DX INJ NEW DRUG ADDON: CPT

## 2019-10-04 RX ORDER — ONDANSETRON 2 MG/ML
4 INJECTION INTRAMUSCULAR; INTRAVENOUS
Status: COMPLETED | OUTPATIENT
Start: 2019-10-04 | End: 2019-10-04

## 2019-10-04 RX ORDER — MORPHINE SULFATE 4 MG/ML
4 INJECTION, SOLUTION INTRAMUSCULAR; INTRAVENOUS
Status: COMPLETED | OUTPATIENT
Start: 2019-10-04 | End: 2019-10-04

## 2019-10-04 RX ORDER — ONDANSETRON 2 MG/ML
4 INJECTION INTRAMUSCULAR; INTRAVENOUS
Status: DISCONTINUED | OUTPATIENT
Start: 2019-10-04 | End: 2019-10-05 | Stop reason: HOSPADM

## 2019-10-04 RX ORDER — POLYETHYLENE GLYCOL 3350 17 G/17G
17 POWDER, FOR SOLUTION ORAL DAILY
Qty: 595 G | Refills: 0 | OUTPATIENT
Start: 2019-10-04 | End: 2020-06-29

## 2019-10-04 RX ADMIN — ONDANSETRON 4 MG: 2 INJECTION INTRAMUSCULAR; INTRAVENOUS at 18:33

## 2019-10-04 RX ADMIN — IOPAMIDOL 99 ML: 612 INJECTION, SOLUTION INTRAVENOUS at 19:05

## 2019-10-04 RX ADMIN — MORPHINE SULFATE 4 MG: 4 INJECTION, SOLUTION INTRAMUSCULAR; INTRAVENOUS at 19:48

## 2019-10-04 RX ADMIN — MORPHINE SULFATE 4 MG: 4 INJECTION, SOLUTION INTRAMUSCULAR; INTRAVENOUS at 18:33

## 2019-10-04 NOTE — ED TRIAGE NOTES
Bedside shift change report given to Bennett (oncoming nurse) by Nette Shields (offgoing nurse). Report included the following information SBAR, Kardex, MAR and Recent Results. Pt with BOBBY drain, states she thinks it may be coming out. Notes a lot of pain at site.

## 2019-10-04 NOTE — TELEPHONE ENCOUNTER
Spoke with pt's mother kaylyn and advised her per Dr Roland Cook wants patient to make an appt for mon 10/07/19 sahil pain at lucas drain site. Dr Roland Cook states patient stated she spoke to someone in the office and was told to go to the ER. Dr Roland Cook states wants patient to follow up with him. [facility] states tubing look like it is about to come out and patient is in severe pain. Advised Kaylyn if pain is too unbearable or if the tube come out completely to go the  ER.   Patient currently scheduled for mon 10/07/19 @ 9am

## 2019-10-05 NOTE — ED NOTES
I have reviewed discharge instructions with the patient. The patient verbalized understanding.  Assisted patient to wheelchair and out of care area by companions at bedside

## 2019-10-05 NOTE — ED PROVIDER NOTES
EMERGENCY DEPARTMENT HISTORY AND PHYSICAL EXAM      Date: 10/4/2019  Patient Name: Morales Perez    History of Presenting Illness     Chief Complaint   Patient presents with    Abdominal Pain    Other       History (Context): Morales Perez is a 61 y.o. gentleman who has hypertension, diabetes, recent surgery for small bowel obstruction complicated by a small bowel resection and peritoneal abscess formation who presents with chronic indwelling BOBBY drain that now has 2 days of progressive onset, severe, localized abdominal pain that is subacute onset and near the site of her BOBBY drain. The BOBBY drain has produced  more fluid. On review of systems, the patient denies fever, chills, nausea, vomiting, diarrhea, back pain, chest pain, shortness of breath, diaphoresis, rashes, tick bite, recent travel. PCP: Trevon Hardy MD    Current Outpatient Medications   Medication Sig Dispense Refill    polyethylene glycol (MIRALAX) 17 gram/dose powder Take 17 g by mouth daily. 1 tablespoon with 8 oz of water daily 595 g 0    cyclobenzaprine (FLEXERIL) 10 mg tablet Take 1 Tab by mouth three (3) times daily as needed for Muscle Spasm(s). 20 Tab 0    doxepin (SINEQUAN) 25 mg capsule Take 1 Cap by mouth nightly. 30 Cap 2    metFORMIN (GLUMETZA ER) 500 mg TG24 24 hour tablet Take 1,000 mg by mouth two (2) times a day. take 1 tablet by mouth twice a day 120 Tab 11    multivitamin (ONE A DAY) tablet Take 1 Tab by mouth daily.  ondansetron hcl (ZOFRAN) 4 mg tablet Take 1 Tab by mouth every eight (8) hours as needed for Nausea. 12 Tab 0    ALLERGY RELIEF, CETIRIZINE, 10 mg tablet take 1 tablet by mouth once daily 90 Tab 3    bisoprolol-hydroCHLOROthiazide (ZIAC) 2.5-6.25 mg per tablet Take 1 Tab by mouth daily.  90 Tab 3    omeprazole (PRILOSEC) 10 mg capsule take 1 capsule by mouth once daily 30 Cap 11    FARXIGA 10 mg tab tablet take 1 tablet by mouth once daily 90 Tab 3    amLODIPine (NORVASC) 2.5 mg tablet take 1 tablet by mouth once daily NIGHTLY 90 Tab 1    albuterol (PROVENTIL HFA, VENTOLIN HFA, PROAIR HFA) 90 mcg/actuation inhaler Take 1 Puff by inhalation every six (6) hours as needed for Wheezing. 1 Inhaler 0    folic acid (FOLVITE) 1 mg tablet Take  by mouth daily.  methotrexate (RHEUMATREX) 2.5 mg tablet Take 7.5 mg by mouth every Tuesday.  sulfaSALAzine (AZULFIDINE) 500 mg tablet Take 500 mg by mouth two (2) times a day.       glucose blood VI test strips (ONETOUCH ULTRA TEST) strip Check blood glucose as directed 100 Strip 1       Past History     Past Medical History:  Past Medical History:   Diagnosis Date    Abdominal adhesions     Adnexal cyst 7/30/2019    Left    Anemia     Asthma     Chronic pelvic pain in female 7/30/2019    Diabetes mellitus     Dyskinesia     bilateral    Essential hypertension     GERD (gastroesophageal reflux disease)     Hypertension     Menopause     Microhematuria     Rheumatoid arteritis ([facility] [address] 2018    Stool color black        Past Surgical History:  Past Surgical History:   Procedure Laterality Date    COLONOSCOPY N/A 1/21/2019    COL[facility] ENDOSCOPY    HX CHOLECYSTECTOMY  6/28/10    HX COLONOSCOPY      HX ENDOSCOPY      HX HERNIA REPAIR      HX HYSTERECTOMY  1989    Fibroids    HX KNEE REPLACEMENT Left     HX KNEE REPLACEMENT Right 06/2018    HX OOPHORECTOMY  12/2000    HX ORTHOPAEDIC Right     ankle- multiple surgeries    HX SMALL BOWEL RESECTION  12/2000    HX SMALL BOWEL RESECTION  02/21/2017    Dr. Yuly Rinaldi         Family History:  Family History   Problem Relation Age of Onset    Hypertension Other         parent    Breast Cancer Other 21    Heart Disease Father     Diabetes Father     Lung Disease Father     Diabetes Mother     Hypertension Other         sibling    Diabetes Other         parent    Kidney Disease Brother     Diabetes Brother  Lung Disease Brother        Social History:  Social History     Tobacco Use    Smoking status: Never Smoker    Smokeless tobacco: Never Used   Substance Use Topics    Alcohol use: No    Drug use: No       Allergies: Allergies   Allergen Reactions    Macrodantin [Nitrofurantoin Macrocrystalline] Itching and Other (comments)    Tape [Adhesive] Other (comments)     Paper tape-- feels like burning skin  Burned skin when had wound vac         Review of Systems   As per HPI, otherwise reviewed and negative. Physical Exam     Vitals:    10/04/19 2100 10/04/19 2115 10/04/19 2130 10/04/19 2200   BP: 126/75 127/76 124/76 124/82   Pulse:       Resp:       Temp:       SpO2: 98% 99% 97% 98%   Weight:       Height:           Gen: In clear pain  HEENT: Normocephalic, sclera anicteric  Cardiovascular: Normal rate, regular rhythm, no murmurs, rubs, gallops. Pulses intact and equal distally. Pulmonary: No respiratory distress. No stridor. Clear lungs. ABD: Soft, tender in the epigastrium, BOBBY drain site tender, nondistended. Neuro: Alert. Normal speech. Normal mentation. Psych: Normal thought content and thought processes. : No CVA tenderness  EXT: Moves all extremities well. No cyanosis or clubbing. Skin: Warm and well-perfused. Diagnostic Study Results     Labs -     No results found for this or any previous visit (from the past 12 hour(s)). Radiologic Studies -   CT ABD PELV W CONT   Final Result   IMPRESSION:      1. Stable postsurgical changes in the abdomen. Mildly distended left upper   abdominal small bowel loops are nonspecific and may represent focal ileus or   early/partial small bowel obstruction. 2. Marked improvement in the size of the fluid collection adjacent to the BOBBY   drain. 3. Persistent mesenteric fat stranding, similar to prior imaging.    4. Left adnexal cyst.      Note: Preliminary report sent to the Emergency Department by the radiology   resident at the time of the study.        CT Results  (Last 48 hours)    None        CXR Results  (Last 48 hours)    None            Medical Decision Making   I am the first provider for this patient. I reviewed the vital signs, available nursing notes, past medical history, past surgical history, family history and social history. Vital Signs-Reviewed the patient's vital signs. Records Reviewed: By myself personally on initial evaluation    MDM:   Patient presents with abdominal pain. Exam significant for tenderness in the right lower quadrant there is a BOBBY drain. DDX considered: Patient with BOBBY drain, gastritis, peptic ulcer disease, cholecystitis, choledocholithiasis, SBO, functional abdominal pain, acute intermittent porphyria, gastroparesis, gastroenteritis. DDX thought to be less likely but also considered due to high risk condition: Cholangitis, mesenteric ischemia. Plan:   Pain Control  Antiemetics  Close Observation  Consult surgery    Orders as below:  Orders Placed This Encounter    CT ABD PELV W CONT    CBC WITH AUTOMATED DIFF    METABOLIC PANEL, BASIC    PROTHROMBIN TIME + INR    CBC W/O DIFF    DIFFERENTIAL, AUTO    ondansetron (ZOFRAN) injection 4 mg    morphine injection 4 mg    iopamidol (ISOVUE 300) 61 % contrast injection 100 mL    morphine injection 4 mg    DISCONTD: ondansetron (ZOFRAN) injection 4 mg    polyethylene glycol (MIRALAX) 17 gram/dose powder        ED Course: Throughout the stay, the patient suffered from severe abdominal pain and nausea. Patient received pain medication for her pain. Surgery came and saw the patient, and the patient BOBBY drain was replaced. Patient feeling better. Patient will follow-up with surgeon as an outpatient. Patient pain better control. Disposition:  Discharge home    DISCHARGE NOTE:     Pt has been reexamined. Patient has no new complaints, changes, or physical findings. Care plan outlined and precautions discussed.   Results were reviewed with the patient. All medications were reviewed with the patient; will d/c home with MiraLAX. All of pt's questions and concerns were addressed. Alarm symptoms and return precautions were discussed in detail with the patient. The patient demonstrates adequate understanding. Patient was instructed and agrees to follow up with general surgery, as well as to return to the ED upon further deterioration. Patient is ready to go home. The patient understands and agrees with this plan. Patient is very happy with her care. Follow-up Information     Follow up With Specialties Details Why Contact Info    Apple Hackett MD Surgery Go in 2 days  [address] [facility] [address] [facility], [address] [place] [address], Internal Medicine  As needed, If symptoms worsen [address] [facility]  [address] [facility]  [phone]         As needed, If symptoms worsen           Discharge Medication List as of 10/4/2019 10:07 PM      START taking these medications    Details   polyethylene glycol (MIRALAX) 17 gram/dose powder Take 17 g by mouth daily. 1 tablespoon with 8 oz of water daily, Print, Disp-595 g, R-0         CONTINUE these medications which have NOT CHANGED    Details   cyclobenzaprine (FLEXERIL) 10 mg tablet Take 1 Tab by mouth three (3) times daily as needed for Muscle Spasm(s). , Normal, Disp-20 Tab, R-0      metFORMIN (GLUMETZA ER) 500 mg TG24 24 hour tablet Take 1,000 mg by mouth two (2) times a day. take 1 tablet by mouth twice a day, Normal, Disp-120 Tab, R-11      multivitamin (ONE A DAY) tablet Take 1 Tab by mouth daily. , Historical Med      ondansetron hcl (ZOFRAN) 4 mg tablet Take 1 Tab by mouth every eight (8) hours as needed for Nausea. , Print, Disp-12 Tab, R-0      ALLERGY RELIEF, CETIRIZINE, 10 mg tablet take 1 tablet by mouth once daily, Normal, Disp-90 Tab, R-3, PHIL      bisoprolol-hydroCHLOROthiazide (ZIAC) 2.5-6.25 mg per tablet Take 1 Tab by mouth daily. , Normal, Disp-90 Tab, R-3      doxepin (SINEQUAN) 10 mg capsule Take 1 Cap by mouth nightly., Normal, Disp-30 Cap, R-11      omeprazole (PRILOSEC) 10 mg capsule take 1 capsule by mouth once daily, Normal, Disp-30 Cap, R-11      FARXIGA 10 mg tab tablet take 1 tablet by mouth once daily, Normal, Disp-90 Tab, R-3      amLODIPine (NORVASC) 2.5 mg tablet take 1 tablet by mouth once daily NIGHTLY, Normal, Disp-90 Tab, R-1      albuterol (PROVENTIL HFA, VENTOLIN HFA, PROAIR HFA) 90 mcg/actuation inhaler Take 1 Puff by inhalation every six (6) hours as needed for Wheezing., Normal, Disp-1 Inhaler, R-0      folic acid (FOLVITE) 1 mg tablet Take  by mouth daily. , Historical Med      methotrexate (RHEUMATREX) 2.5 mg tablet Take 7.5 mg by mouth every Tuesday., Historical Med      sulfaSALAzine (AZULFIDINE) 500 mg tablet Take 500 mg by mouth two (2) times a day., Historical Med      glucose blood VI test strips (ONETOUCH ULTRA TEST) strip Check blood glucose as directed, Normal, Disp-100 Strip, R-1             Procedures:  Procedures      Critical Care Time:       Diagnosis     Clinical Impression:   1. Right lower quadrant abdominal pain    2. Constipation, unspecified constipation type        Signed,  Ira Tiwari MD  Emergency Physician  [facility]    As a voice dictation software was utilized to dictate this note, minor word transpositions can occur. I apologize for confusing wording and typographic errors. Please feel free to contact me for clarification.

## 2019-10-05 NOTE — PROGRESS NOTES
Called to see patient in ER for PSBO and increased drain output. Pt states that yesterday, the [facility] nurse stripped the drain and put a lot of padding around the drain and this process was excruciating for her. She has been having pain so bad at the site that she is having nausea as well. She called and spoke with Dr. Praful Oh who saw her 2 days ago and was send here. Her bowels are fine. She is not having any cramping and all the pain seems associated with the drain. No emesis. Output 40 at last check and usually 10-30 each time they drain which is several times a day. PMH DM, RA, HTN, GERD, asthma  PSH ex lap with SBR 12/2000, ex laparoscopy with RANJITH 3/2016, EX lap with RANJITH and SBR 2/22/17, 8/2/19, and 8/10/19. Also frida, hyst, right ovary and BTKR    Meds Ziac, metformin MVI, amlodipine, farxiga. No other meds. No CP or SOB. Visit Vitals  /76   Pulse (!) 103   Temp 98.3 °F (36.8 °C)   Resp 18   Ht 5' 4\" (1.626 m)   Wt 66.7 kg (147 lb)   SpO2 99%   BMI 25.23 kg/m²     Abd soft, NT except at drain site, ND. The white part of BOBBY is out slightly, but none of the holes are out and vacuum is still working. This will not go back in. I padded this and reapplied the dressing and the pain improved. I placed a new bulb as the old had a foul smell. CT reviewed. She does have stool throughout the colon. I reviewed films, and there is one loop of small bowel with minimal enlargement, but no clear PSBO. A/P  I think that this was all pain from the drain being pulled and then taped under tension. I d/w them that the drain works as a vacuum, so this can be taped on the abdomen where it is not pulling. She is to see Dr. Rocio De Luna on Monday in the office. With the amount of output, I expect this is not ready to come out. She is to get permission to shower when she seems him as this should be fine at this point.     I advised her to add bowel care in light of the appearance on the CT, and she prefers to use dulcolax and will add a fiber supplement. I don't think that she needs admission as her symptoms dramatically improved with adjusting the drain and were not consistent with bowel obstruction. I would not add narcotics at this point especially with the appearance of the constipation.     D/w ER MD Brenda Friedman MD

## 2019-10-05 NOTE — ED PROVIDER NOTES
EMERGENCY DEPARTMENT HISTORY AND PHYSICAL EXAM 
 
 
Date: 10/4/2019 Patient Name: Rick Quiroga History of Presenting Illness Chief Complaint Patient presents with  Abdominal Pain  Other History (Context): Rick Quiroga is a 61 y.o. gentleman with hypertension, diabetes, recent surgery for small bowel obstruction complicated by a small bowel resection and peritoneal abscess formation, who has a chronic indwelling BOBBY drain, who presents with 2 days of progressive onset, severe, localized abdominal pain near the site of her BOBBY drain. The patient BOBBY drain is been putting out more fluid. On review of systems, the patient denies fever, chills, nausea, vomiting, diarrhea, back pain, chest pain, shortness of breath, diaphoresis, rashes, tick bite, recent travel. PCP: Sav Gonzalez MD 
 
Current Facility-Administered Medications Medication Dose Route Frequency Provider Last Rate Last Dose  ondansetron (ZOFRAN) injection 4 mg  4 mg IntraVENous NOW Stephanie Luna MD   Stopped at 10/04/19 2025 Current Outpatient Medications Medication Sig Dispense Refill  cyclobenzaprine (FLEXERIL) 10 mg tablet Take 1 Tab by mouth three (3) times daily as needed for Muscle Spasm(s). 20 Tab 0  
 metFORMIN (GLUMETZA ER) 500 mg TG24 24 hour tablet Take 1,000 mg by mouth two (2) times a day. take 1 tablet by mouth twice a day 120 Tab 11  
 multivitamin (ONE A DAY) tablet Take 1 Tab by mouth daily.  ondansetron hcl (ZOFRAN) 4 mg tablet Take 1 Tab by mouth every eight (8) hours as needed for Nausea. 12 Tab 0  ALLERGY RELIEF, CETIRIZINE, 10 mg tablet take 1 tablet by mouth once daily 90 Tab 3  
 bisoprolol-hydroCHLOROthiazide (ZIAC) 2.5-6.25 mg per tablet Take 1 Tab by mouth daily. 90 Tab 3  
 doxepin (SINEQUAN) 10 mg capsule Take 1 Cap by mouth nightly.  30 Cap 11  
 omeprazole (PRILOSEC) 10 mg capsule take 1 capsule by mouth once daily 30 Cap 11  
  FARXIGA 10 mg tab tablet take 1 tablet by mouth once daily 90 Tab 3  
 amLODIPine (NORVASC) 2.5 mg tablet take 1 tablet by mouth once daily NIGHTLY 90 Tab 1  
 albuterol (PROVENTIL HFA, VENTOLIN HFA, PROAIR HFA) 90 mcg/actuation inhaler Take 1 Puff by inhalation every six (6) hours as needed for Wheezing. 1 Inhaler 0  
 folic acid (FOLVITE) 1 mg tablet Take  by mouth daily.  methotrexate (RHEUMATREX) 2.5 mg tablet Take 7.5 mg by mouth every Tuesday.  sulfaSALAzine (AZULFIDINE) 500 mg tablet Take 500 mg by mouth two (2) times a day.  glucose blood VI test strips (ONETOUCH ULTRA TEST) strip Check blood glucose as directed 100 Strip 1 Past History Past Medical History: 
Past Medical History:  
Diagnosis Date  Abdominal adhesions  Adnexal cyst 7/30/2019 Left  Anemia  Asthma  Chronic pelvic pain in female 7/30/2019  Diabetes mellitus  Dyskinesia   
 bilateral  
 Essential hypertension  GERD (gastroesophageal reflux disease)  Hypertension  Menopause  Microhematuria  Rheumatoid arteritis ([facility] [address] 2018  Stool color black Past Surgical History: 
Past Surgical History:  
Procedure Laterality Date  COLONOSCOPY N/A 1/21/2019 COLONOSCOPY performed by Leandro Payne MD at [address]  6/28/10  
 HX COLONOSCOPY    
 HX ENDOSCOPY    
 [address]    
 [address] [facility] Fibroids  HX KNEE REPLACEMENT Left  HX KNEE REPLACEMENT Right 06/2018  HX OOPHORECTOMY  12/2000  HX ORTHOPAEDIC Right   
 ankle- multiple surgeries  HX SMALL BOWEL RESECTION  12/2000  HX SMALL BOWEL RESECTION  02/21/2017 Dr. Janine Culp [address] [facility] Family History: 
Family History Problem Relation Age of Onset  Hypertension Other   
     parent  Breast Cancer Other 21  
 Heart Disease Father  Diabetes Father  Lung Disease Father  Diabetes Mother  Hypertension Other   
     sibling  Diabetes Other   
     parent  Kidney Disease Brother  Diabetes Brother  Lung Disease Brother Social History: 
Social History Tobacco Use  Smoking status: Never Smoker  Smokeless tobacco: Never Used Substance Use Topics  Alcohol use: No  
 Drug use: No  
 
 
Allergies: Allergies Allergen Reactions  Macrodantin [Nitrofurantoin Macrocrystalline] Itching and Other (comments)  Tape [Adhesive] Other (comments) Paper tape-- feels like burning skin Burned skin when had wound vac Review of Systems As per HPI, otherwise reviewed and negative. Physical Exam  
 
Vitals:  
 10/04/19 2030 10/04/19 2045 10/04/19 2100 10/04/19 2115 BP: 133/79 127/80 126/75 127/76 Pulse:      
Resp:      
Temp:      
SpO2: 97% 95% 98% 99% Weight:      
Height:      
 
 
Gen: In clear pain HEENT: Normocephalic, sclera anicteric Cardiovascular: Normal rate, regular rhythm, no murmurs, rubs, gallops. Pulses intact and equal distally. Pulmonary: No respiratory distress. No stridor. Clear lungs. ABD: Soft, BOBBY drain in place, tenderness near the BOBBY, nondistended. Neuro: Alert. Normal speech. Normal mentation. Psych: Normal thought content and thought processes. : No CVA tenderness EXT: Moves all extremities well. No cyanosis or clubbing. Skin: Warm and well-perfused. Diagnostic Study Results Labs - Recent Results (from the past 12 hour(s)) CBC WITH AUTOMATED DIFF Collection Time: 10/04/19  6:19 PM  
Result Value Ref Range WBC  K/uL SPECIMEN CLOTTED SUGGEST RECOLLECTION NOTIFIED KAMI CAMPBELL ON 10/4/19 AT 1845 BY PB.  
 RBC  M/uL SPECIMEN CLOTTED SUGGEST RECOLLECTION NOTIFIED KAMI CAMPBELL ON 10/4/19 AT 1845 BY PB.  
 HGB  g/dL SPECIMEN CLOTTED SUGGEST RECOLLECTION NOTIFIED KAMI CAMPBELL ON 10/4/19 AT 1845 BY PB.  
 HCT  %   [facility] NOTIFIED KAMI Goodson ON 10/4/19 AT 1845 BY PB. MCV  FL  
  SPECIMEN CLOTTED SUGGEST RECOLLECTION NOTIFIED KAMI CAMPBELL ON 10/4/19 AT 1845 BY PB.  
 MCH  PG  
  SPECIMEN CLOTTED SUGGEST RECOLLECTION NOTIFIED KAMI CAMPBELL ON 10/4/19 AT 1845 BY PB.  
 MCHC  g/dL SPECIMEN CLOTTED SUGGEST RECOLLECTION NOTIFIED KAMI CAMPBELL ON 10/4/19 AT 1845 BY PB.  
 RDW  % SPECIMEN CLOTTED SUGGEST RECOLLECTION NOTIFIED KAMI CAMPBELL ON 10/4/19 AT 1845 BY PB. PLATELET  K/uL SPECIMEN CLOTTED SUGGEST RECOLLECTION NOTIFIED KAMI CAMPBELL ON 10/4/19 AT 1845 BY PB. MPV  FL  
  SPECIMEN CLOTTED SUGGEST RECOLLECTION NOTIFIED KAMI CAMPBELL ON 10/4/19 AT 1845 BY PB.  
 NEUTROPHILS PENDING % LYMPHOCYTES PENDING % MONOCYTES PENDING % EOSINOPHILS PENDING % BASOPHILS PENDING %  
 ABS. NEUTROPHILS PENDING K/UL  
 ABS. LYMPHOCYTES PENDING K/UL  
 ABS. MONOCYTES PENDING K/UL  
 ABS. EOSINOPHILS PENDING K/UL  
 ABS. BASOPHILS PENDING K/UL  
 DF PENDING   
METABOLIC PANEL, BASIC Collection Time: 10/04/19  6:19 PM  
Result Value Ref Range Sodium 138 136 - 145 mmol/L Potassium 3.8 3.5 - 5.5 mmol/L Chloride 101 100 - 111 mmol/L  
 CO2 26 21 - 32 mmol/L Anion gap 11 3.0 - 18 mmol/L Glucose 126 (H) 74 - 99 mg/dL BUN 16 7.0 - 18 MG/DL Creatinine 0.92 0.6 - 1.3 MG/DL  
 BUN/Creatinine ratio 17 12 - 20 GFR est AA >60 >60 ml/min/1.73m2 GFR est non-AA >60 >60 ml/min/1.73m2 Calcium 10.1 8.5 - 10.1 MG/DL PROTHROMBIN TIME + INR Collection Time: 10/04/19  6:19 PM  
Result Value Ref Range Prothrombin time 11.0 (L) 11.5 - 15.2 sec INR 0.8 0.8 - 1.2    
CBC W/O DIFF Collection Time: 10/04/19  6:53 PM  
Result Value Ref Range WBC 6.0 4.6 - 13.2 K/uL  
 RBC 4.29 4. 20 - 5.30 M/uL  
 HGB 11.6 (L) 12.0 - 16.0 g/dL HCT 37.2 35.0 - 45.0 % MCV 86.7 74.0 - 97.0 FL  
 MCH 27.0 24.0 - 34.0 PG  
 MCHC 31.2 31.0 - 37.0 g/dL  
 RDW 15.9 (H) 11.6 - 14.5 % PLATELET 643 838 - 334 K/uL MPV 9.6 9.2 - 11.8 FL  
DIFFERENTIAL, AUTO Collection Time: 10/04/19  6:53 PM  
Result Value Ref Range NEUTROPHILS 58 40 - 73 % LYMPHOCYTES 30 21 - 52 % MONOCYTES 9 3 - 10 % EOSINOPHILS 3 0 - 5 % BASOPHILS 0 0 - 2 %  
 ABS. NEUTROPHILS 3.5 1.8 - 8.0 K/UL  
 ABS. LYMPHOCYTES 1.8 0.9 - 3.6 K/UL  
 ABS. MONOCYTES 0.6 0.05 - 1.2 K/UL  
 ABS. EOSINOPHILS 0.2 0.0 - 0.4 K/UL  
 ABS. BASOPHILS 0.0 0.0 - 0.1 K/UL  
 DF AUTOMATED Radiologic Studies -  
CT ABD PELV W CONT    (Results Pending) CT Results  (Last 48 hours) None CXR Results  (Last 48 hours) None Medical Decision Making I am the first provider for this patient. I reviewed the vital signs, available nursing notes, past medical history, past surgical history, family history and social history. Vital Signs-Reviewed the patient's vital signs. EKG: Interpreted by myself. *** Records Reviewed: By myself personally on initial evaluation MDM:  
Patient presents with abdominal pain. Exam significant for tenderness in the ***. DDX considered: Gastritis, peptic ulcer disease, cholecystitis, choledocholithiasis, SBO, functional abdominal pain, acute intermittent porphyria, gastroparesis, gastroenteritis. DDX thought to be less likely but also considered due to high risk condition: Cholangitis, mesenteric ischemia. Plan:  
Pain Control Antiemetics Close Observation*** Orders as below: 
Orders Placed This Encounter  CT ABD PELV W CONT  CBC WITH AUTOMATED DIFF  
 METABOLIC PANEL, BASIC  
 PROTHROMBIN TIME + INR  CBC W/O DIFF  
 DIFFERENTIAL, AUTO  DIET NPO  
 ondansetron (ZOFRAN) injection 4 mg  morphine injection 4 mg  iopamidol (ISOVUE 300) 61 % contrast injection 100 mL  morphine injection 4 mg  ondansetron (ZOFRAN) injection 4 mg  IP CONSULT TO HOSPITALIST  
  
 
ED Course:  
  
 
 
Disposition: 
*** DISCHARGE NOTE:  
*** Pt has been reexamined.  *** Patient has no new complaints, changes, or physical findings. Care plan outlined and precautions discussed. Results were reviewed with the patient. All medications were reviewed with the patient; will d/c home with ***. All of pt's questions and concerns were addressed. Alarm symptoms and return precautions were discussed in detail with the patient. The patient demonstrates adequate understanding. Patient was instructed and agrees to follow up with ***, as well as to return to the ED upon further deterioration. Patient is ready to go home. The patient understands and agrees with this plan. Patient is ***very happy with her care. Follow-up Information None Current Discharge Medication List  
  
 
 
Procedures: 
Procedures Critical Care Time:  
 
 
Diagnosis Clinical Impression: No diagnosis found. Signed, Gisela Ramirez MD 
Emergency Physician 
[facility] As a voice dictation software was utilized to dictate this note, minor word transpositions can occur. I apologize for confusing wording and typographic errors. Please feel free to contact me for clarification.

## 2019-10-05 NOTE — DISCHARGE INSTRUCTIONS

## 2019-10-07 ENCOUNTER — OFFICE VISIT (OUTPATIENT)
Dept: FAMILY MEDICINE CLINIC | Age: 59
End: 2019-10-07

## 2019-10-07 ENCOUNTER — OFFICE VISIT (OUTPATIENT)
Dept: SURGERY | Age: 59
End: 2019-10-07

## 2019-10-07 VITALS
DIASTOLIC BLOOD PRESSURE: 88 MMHG | HEIGHT: 64 IN | WEIGHT: 148 LBS | TEMPERATURE: 98.2 F | OXYGEN SATURATION: 100 % | SYSTOLIC BLOOD PRESSURE: 124 MMHG | HEART RATE: 97 BPM | BODY MASS INDEX: 25.27 KG/M2

## 2019-10-07 VITALS
OXYGEN SATURATION: 98 % | WEIGHT: 148 LBS | DIASTOLIC BLOOD PRESSURE: 76 MMHG | BODY MASS INDEX: 25.27 KG/M2 | TEMPERATURE: 98.6 F | HEART RATE: 83 BPM | HEIGHT: 64 IN | RESPIRATION RATE: 16 BRPM | SYSTOLIC BLOOD PRESSURE: 112 MMHG

## 2019-10-07 DIAGNOSIS — M79.641 RIGHT HAND PAIN: ICD-10-CM

## 2019-10-07 DIAGNOSIS — G47.01 INSOMNIA SECONDARY TO CHRONIC PAIN: ICD-10-CM

## 2019-10-07 DIAGNOSIS — I10 ESSENTIAL HYPERTENSION: ICD-10-CM

## 2019-10-07 DIAGNOSIS — E11.65 TYPE 2 DIABETES MELLITUS WITH HYPERGLYCEMIA, WITHOUT LONG-TERM CURRENT USE OF INSULIN (HCC): ICD-10-CM

## 2019-10-07 DIAGNOSIS — G89.29 INSOMNIA SECONDARY TO CHRONIC PAIN: ICD-10-CM

## 2019-10-07 DIAGNOSIS — Z87.19 S/P SMALL BOWEL OBSTRUCTION: Primary | ICD-10-CM

## 2019-10-07 DIAGNOSIS — Z23 NEEDS FLU SHOT: ICD-10-CM

## 2019-10-07 DIAGNOSIS — Z12.31 SCREENING MAMMOGRAM, ENCOUNTER FOR: ICD-10-CM

## 2019-10-07 DIAGNOSIS — Z09 POSTOPERATIVE EXAMINATION: Primary | ICD-10-CM

## 2019-10-07 RX ORDER — DOXEPIN HYDROCHLORIDE 25 MG/1
25 CAPSULE ORAL
Qty: 30 CAP | Refills: 2 | Status: SHIPPED | OUTPATIENT
Start: 2019-10-07 | End: 2020-06-04 | Stop reason: SDUPTHER

## 2019-10-07 NOTE — PROGRESS NOTES
Samantha Ayala is a 61 y.o.  female and presents with    Chief Complaint   Patient presents with   [facility] Follow Up     Subjective:  Mrs. Yenny Baeza presents with her mother for f/u after extended hospitalization after exploratory laparoscopy and adhesion takedown which were then complicated by bowel obstruction. She underwent surgery then remained inpatient for 22 days. She was transferred to [address] [place] [address] for transitional care. She has had f/u with dr. Shabana Rebollar; she has lucas drain on the right side; she has had decreased appetite. Depression Review:  Patient is seen for followup of depression. Treatment includes none and no other therapies. Ongoing symptoms include depressed mood, anhedonia, insomnia, fatigue, feelings of worthlessness/guilt, difficulty concentrating and hopelessness. She denies hypersomnia, impaired memory and recurrent thoughts of death. She experiences the following side effects from the treatment: none. Anxiety Review:  Patient is seen for sleep disturbance. Current treatment includes doxepin and no other therapies. Ongoing symptoms include: insomnia. Patient denies: palpitations, sweating, chest pain, shortness of breath, dizziness, paresthesias, racing thoughts, psychomotor agitation, feelings of losing control, difficulty concentrating, suicidal ideation. Reported side effects from the treatment: none. ROS   Constitutional: Negative for fever. HENT: Negative for sore throat.    Eyes: Negative for visual disturbance. Respiratory: Negative for shortness of breath.    Cardiovascular: Negative for chest pain. Gastrointestinal: Positive for abdominal pain. Endocrine: Negative for polyuria. Genitourinary: Positive for frequency. Negative for difficulty urinating and hematuria. Musculoskeletal: Positive for back pain. Negative for gait problem. Skin: Positive for rash.    Allergic/Immunologic: Negative for immunocompromised state.   Neurological: Negative for syncope. Psychiatric/Behavioral: Positive for sleep disturbance.   All other systems reviewed and are negative. Objective:  Vitals:    10/07/19 1310   BP: 112/76   Pulse: 83   Resp: 16   Temp: 98.6 °F (37 °C)   TempSrc: Oral   SpO2: 98%   Weight: 148 lb (67.1 kg)   Height: 5' 4\" (1.626 m)   PainSc:  10 - Worst pain ever   PainLoc: Abdomen       alert, well appearing, and in no distress, oriented to person, place, and time and normal appearing weight  Mental status - anxious  Chest - clear to auscultation, no wheezes, rales or rhonchi, symmetric air entry  Heart - normal rate, regular rhythm, normal S1, S2, no murmurs, rubs, clicks or gallops  Abdomen - soft, nondistended, no masses or organomegaly  tenderness noted diffuse  Extremities - peripheral pulses normal, no pedal edema, no clubbing or cyanosis  Skin - incision sites clean, dry and intact    LABS   hgb 11.6  TESTS  CT abd/pelvic  IMPRESSION:     1. Stable postsurgical changes in the abdomen. Mildly distended left upper  abdominal small bowel loops are nonspecific and may represent focal ileus or  early/partial small bowel obstruction. 2. Marked improvement in the size of the fluid collection adjacent to the BOBBY  drain. 3. Persistent mesenteric fat stranding, similar to prior imaging. 4. Left adnexal cyst.    Assessment/Plan:    1. S/p small bowel obstruction  Doing well; f/u with surgery as scheduled    2. Type 2 diabetes mellitus with hyperglycemia, without long-term current use of insulin (HCC)  Goal hgb a1c <7    3. Insomnia secondary to chronic pain    - doxepin (SINEQUAN) 25 mg capsule; Take 1 Cap by mouth nightly. Dispense: 30 Cap; Refill: 2    4. Essential hypertension  Goal <130/80    5.  Needs flu shot    - INFLUENZA VIRUS VAC QUAD,SPLIT,PRESV FREE SYRINGE IM  - MI IMMUNIZ ADMIN,1 SINGLE/COMB VAC/TOXOID      Lab review: labs reviewed, I note that hemogram mildly abnormal but acceptable      I have discussed the diagnosis with the patient and the intended plan as seen in the above orders. The patient has received an after-visit summary and questions were answered concerning future plans. I have discussed medication side effects and warnings with the patient as well. I have reviewed the plan of care with the patient, accepted their input and they are in agreement with the treatment goals.

## 2019-10-07 NOTE — PROGRESS NOTES
1. Have you been to the ER, urgent care clinic since your last visit? Hospitalized since your last visit? Yes, surgery to remove scar tissue, [facility]    2. Have you seen or consulted any other health care providers outside of the [address] [place] [address] since your last visit? Include any pap smears or colon screening. No     Patient presents in office today for hospital follow up care.   Patient concerns:

## 2019-10-07 NOTE — PROGRESS NOTES
Patient seen and examined. She stated that after she called me last week because her drain was slightly pulled out she went to the emergency room. I explained to the patient that I did not want her to go to the ER and I did review the note from Dr. Concepcion Mulligan the patient stated that she is feeling slightly better. She has stated that she was asked to take stool softeners daily she is having bowel movement at least twice a day so she did not take them. I explained to the patient that she does not need a stool softener because she already has a loose bowel movements. The patient also told me that she was asked that she may need an NG tube to be placed in the ER which I did not see any note stating that. The drain output has been minimal since she came back from the emergency room. On Friday it drained about 60 cc, on Saturday 10 cc , and on Sunday 0 today there is no drainage in the drain. Because of the 60 cc on Friday I would like to wait for another couple weeks before removing the drain though I think it is almost ready to be removed.    Follow-up with me in 2 weeks

## 2019-10-07 NOTE — PROGRESS NOTES
Purvi Slater is a 61 y.o. female (: 1960) presenting to address:    Chief Complaint   Patient presents with    Surgical Follow-up     Pain at BOBBY drain site/ ER follow up       Medication list and allergies have been reviewed with Purvi Slater and updated as of today's date. I have gone over all Medical, Surgical and Social History with Purvi Slater and updated/added the information accordingly. 1. Have you been to the ER, Urgent Care or Hospitalized since your last visit? YES. [facility] 10/5/29 Pain at Bobby drain site    2. Have you followed up with your PCP or any other Physicians since your procedure/ last office visit?    NO

## 2019-10-15 ENCOUNTER — HOSPITAL ENCOUNTER (OUTPATIENT)
Dept: PHYSICAL THERAPY | Age: 59
Discharge: HOME | End: 2019-10-15
Payer: COMMERCIAL

## 2019-10-15 PROCEDURE — 97165 OT EVAL LOW COMPLEX 30 MIN: CPT

## 2019-10-15 PROCEDURE — 97110 THERAPEUTIC EXERCISES: CPT

## 2019-10-15 NOTE — PROGRESS NOTES
[address] [facility] PHYSICAL THERAPY  [address] [facility] [address], [address] - Phone: [phone]  Fax: [phone] / [address] [facility]  Patient Name: Maria A Verdugo : 1960   Medical   Diagnosis: Right wrist pain [M25.531] Treatment Diagnosis: Right shoulder/hand/  Wrist stiffness   Onset Date: 2019     Referral Source: Joo Arzola MD Start of [facility]): 10/15/2019   Prior Hospitalization: See medical history Provider #: [id number]   Prior Level of Function: Ind; worked [employer] as     Comorbidities: Arthritis; diabetes    Medications: Verified on Patient Summary List   The Plan of Care and following information is based on the information from the initial evaluation.   ===========================================================================================  Assessment / key information: Patient is a 62 yo right handed female s/p prolonged casting right wrist and hand due to \"bad nerves\" She had other medical issues, was admitted to the hospital and had the cast removed there. She presents today with stiffness and impaired ROM right arm. AROM RUE deficits: SH flex/abd 0-80; stiff end range IR/ER; wrist flex/ext 0-45; RD 0-10; fist 1.5\" from palm with trace DIP flexion. Arm strength NT. Right  10# left 40#. Right pinch 5-6# left 11-12#. Sensation intact. Pain 4/10. Mild digit edema. ADLs- min A with bathing, uses left hand with dressing; difficulty with cutting foods, using utensils. She is currently using a RW due to abdominal surgery. FOTO score: na  ===========================================================================================  Eval Complexity: History: LOW Complexity : Brief history review ;  Examination: MEDIUM Complexity : 3-5 performance deficits relating to physical, cognitive , or psychosocial skils that result in activity limitations and / or participation restrictions; Decision Making:LOW Complexity : No comorbidities that affect functional and no verbal or physical assistance needed to complete eval tasks   Problem List: Pain effecting function, Decreased range of motion, Decreased strength, Decreased ADL/functional abilities  and Decreased flexibility/joint mobility   Treatment Plan may include any combination of the following: Therapeutic exercise, Therapeutic activities, Physical agent/modality, Manual therapy, Patient education and ADLs/IADLs  Patient / Family readiness to learn indicated by: asking questions, trying to perform skills and interest  Persons(s) to be included in education: patient (P)  Barriers to Learning/Limitations: None  Measures taken: na   Patient Goal (s): Make hand better. Patient self reported health status: fair  Rehabilitation Potential: good   Short Term Goals: To be accomplished in 3 weeks: 1. Ind HEP to maximize rehab potential   2. Full active fist to hold coins  3. Right SH flex to 130 degrees for reach   Long Term Goals: To be accomplished in 4-5  weeks: 1. Decrease pain 2/10 with ADLs  2. Normal AROM right wrist for ADLs  3.Baseline ADLs with right hand  4. Ind simple meal prep  Frequency / Duration:   Patient to be seen 2  times per week for 4-5  weeks:  Patient / Caregiver education and instruction: exercises    Therapist Signature: VIVIAN Urbina/L Date: 24/51/5903   Certification Period: na Time: 6:16 PM   ===========================================================================================  I certify that the above Occupational Therapy Services are being furnished while the patient is under my care. I agree with the treatment plan and certify that this therapy is necessary. Physician Signature:        Date:       Time:     Please sign and return to [facility] or you may fax the signed copy to [phone]. Thank you.

## 2019-10-15 NOTE — PROGRESS NOTES
OCCUPATIONAL THERAPY - DAILY TREATMENT NOTE    Patient Name: Moi Crenshaw        Date: 10/15/2019  : 1960   YES Patient  Verified  Visit #:   1   of   10  Insurance: Payor: Brianda De Paz / Plan: [place] [employer] [place] / Product Type: PPO /      In time: 3:35 Out time: 4:20   Total Treatment Time: 45     TREATMENT AREA =  Right wrist    SUBJECTIVE    Pain Level (on 0 to 10 scale):  4  / 10   Medication Changes/New allergies or changes in medical history, any new surgeries or procedures? NO    If yes, update Summary List   Subjective Functional Status/Changes:  []  No changes reported     They took my cast off in the hospital but it was left on too long. OBJECTIVE    Modalities Rationale:     increase tissue extensibility to improve patient's ability to make a fist    min [] Estim, type/location:                                      []  att     []  unatt     []  w/US     []  w/ice    []  w/heat    min []  Ultrasound, settings/location:      min []  Iontophoresis w/ dexamethasone, location:                                               []  take home patch       []  in clinic    min []  Ice     []  Heat    location/position:    10 min [x]  Paraffin:  location Right hand with heat    min []  Vasopneumatic Device, press/temp:     min []  Other:    [x] Skin assessment post-treatment (if applicable):    [x]  intact    []  redness- no adverse reaction     []redness  adverse reaction:         min Therapeutic Exercise:  [x]  See flow sheet       10 min Patient Education:  YES  Reviewed HEP- right hand stretches   []  Progressed/Changed HEP based on: Other Objective/Functional Measures:    See eval for details. After paraffin, PROM allowed digit tips to touch distal palm.      Post Treatment Pain Level (on 0 to 10) scale:   3  / 10     ASSESSMENT  Assessment/Changes in Function:     Patient presents with stiff right wrist and hand s/p prolonged casting and immobility and should benefit from skilled OT to address deficits. OT Eval Complexity Justification:  Patient History: Low- casted due to \"nerve\" issue  Examination : Mod- AROM; strength; pain and ADLs  Clinical Decision Making: Low- desires to return to work       [x]  See Progress Note/Recertification   Patient will continue to benefit from skilled OT services to address ROM deficits and address strength deficits to attain remaining goals.    Progress toward goals / Updated goals:    See eval goals      PLAN  [x]  Upgrade activities as tolerated YES Continue plan of care   []  Discharge due to :    [x]  Other: OT 2x week x 4-5 weeks for goals      Therapist: VIVIAN Delaney/JOCELYN    Date: 10/15/2019 Time: 6:16 PM

## 2019-10-21 ENCOUNTER — TELEPHONE (OUTPATIENT)
Dept: SURGERY | Age: 59
End: 2019-10-21

## 2019-10-21 ENCOUNTER — OFFICE VISIT (OUTPATIENT)
Dept: SURGERY | Age: 59
End: 2019-10-21

## 2019-10-21 VITALS
HEART RATE: 86 BPM | DIASTOLIC BLOOD PRESSURE: 71 MMHG | TEMPERATURE: 96.5 F | SYSTOLIC BLOOD PRESSURE: 106 MMHG | WEIGHT: 149 LBS | OXYGEN SATURATION: 95 % | BODY MASS INDEX: 25.58 KG/M2

## 2019-10-21 DIAGNOSIS — Z09 POSTOPERATIVE EXAMINATION: Primary | ICD-10-CM

## 2019-10-21 NOTE — TELEPHONE ENCOUNTER
Spoke with Ms. Damian regarding [facility] extension request to inform Dr. Gricelda Rankin is not in the office at this time but I did speak with him by phone and he's indicating when she was in the office earlier today she had indicated she was feeling fine and returning to work next week. MsKailash Emily Edmond said she's not quite sure if Dr. Gricelda Rankin is aware of her job duties and she's afraid it's too soon for her to return next week as she's fearful of potential injury to surgery site.

## 2019-10-21 NOTE — PROGRESS NOTES
Alejandro Singer is a 61 y.o. female (: 1960) presenting to address:    Chief Complaint   Patient presents with    Surgical Follow-up     Poss Jed drain removal       Medication list and allergies have been reviewed with Alejandro Singer and updated as of today's date. I have gone over all Medical, Surgical and Social History with Alejandro Singer and updated/added the information accordingly. 1. Have you been to the ER, Urgent Care or Hospitalized since your last visit? NO      2. Have you followed up with your PCP or any other Physicians since your procedure/ last office visit?    NO

## 2019-10-21 NOTE — TELEPHONE ENCOUNTER
Ms. Az Soto called inquiring if Dr. 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 I told Ms. Damian I would call her after I discuss her request with Dr. Piyush Carvalho. I asked Dr. Piyush Carvalho about Mrs. Damian request for an extension and Dr. Piyush Carvalho states he will not be able to extend the request because when Mrs. Luiz Biggs was seen earlier today she was doing well, stated she was fine and was returning to work next week.

## 2019-10-21 NOTE — PROGRESS NOTES
Patient seen and examined. She is doing great. Her BOBBY drain output is minimal to 0 for the last 1 week. Her abdomen is soft and non tender. She is tolerating regular diet and having normal bowel movements. I removed her BOBBY drain. She is going back to work next week on Monday.   Follow-up with me as needed

## 2019-10-22 DIAGNOSIS — M17.12 PRIMARY OSTEOARTHRITIS OF LEFT KNEE: ICD-10-CM

## 2019-10-22 RX ORDER — CYCLOBENZAPRINE HCL 10 MG
10 TABLET ORAL
Qty: 20 TAB | Refills: 0 | Status: SHIPPED | OUTPATIENT
Start: 2019-10-22 | End: 2020-04-22

## 2019-10-29 ENCOUNTER — HOSPITAL ENCOUNTER (OUTPATIENT)
Dept: PHYSICAL THERAPY | Age: 59
Discharge: HOME | End: 2019-10-29
Payer: COMMERCIAL

## 2019-10-29 PROCEDURE — 97110 THERAPEUTIC EXERCISES: CPT

## 2019-10-29 NOTE — PROGRESS NOTES
OCCUPATIONAL THERAPY - DAILY TREATMENT NOTE    Patient Name: Misbah Gardner        Date: 10/29/2019  : 1960   YES Patient  Verified  Visit #:   2   of   10  Insurance: Payor: Sabas Kam / Plan: [address] PPO / Product Type: PPO /      In time: 8:00 Out time: 8:30   Total Treatment Time: 30     TREATMENT AREA =  Right arm    SUBJECTIVE    Pain Level (on 0 to 10 scale):  0  / 10   Medication Changes/New allergies or changes in medical history, any new surgeries or procedures? NO    If yes, update Summary List   Subjective Functional Status/Changes:  []  No changes reported     My hand just feels stiff now. I'm going back to work on Monday. OBJECTIVE    30 min Therapeutic Exercise:  [x]  See flow sheet   Rationale:      increase ROM, increase strength and improve coordination to improve the patients ability to use right hand without restriction       min Patient Education:  YES  Reviewed HEP   []  Progressed/Changed HEP based on: Other Objective/Functional Measures:    Patient can touch digit tips to mid palm for fist. Began shoulder and hand stretch and strengthening. Post Treatment Pain Level (on 0 to 10) scale:   sore  / 10     ASSESSMENT  Assessment/Changes in Function:     Good potential to return to baseline. []  See Progress Note/Recertification   Patient will continue to benefit from skilled OT services to address ROM deficits and address strength deficits to attain remaining goals.    Progress toward goals / Updated goals:    Met active fist goal     PLAN  [x]  Upgrade activities as tolerated YES Continue plan of care   []  Discharge due to :    []  Other:      Therapist: VIVIAN Luz/JOCELYN    Date: 10/29/2019 Time: 10:19 AM

## 2019-10-30 ENCOUNTER — OFFICE VISIT (OUTPATIENT)
Dept: SURGERY | Age: 59
End: 2019-10-30

## 2019-10-30 VITALS
WEIGHT: 157 LBS | HEIGHT: 64 IN | BODY MASS INDEX: 26.8 KG/M2 | OXYGEN SATURATION: 93 % | TEMPERATURE: 97.8 F | DIASTOLIC BLOOD PRESSURE: 110 MMHG | SYSTOLIC BLOOD PRESSURE: 147 MMHG | HEART RATE: 90 BPM

## 2019-10-30 DIAGNOSIS — Z09 POSTOPERATIVE EXAMINATION: Primary | ICD-10-CM

## 2019-10-30 NOTE — LETTER
NOTIFICATION RETURN TO WORK / SCHOOL 
 
 
 
10/30/2019 8:40 AM 
 
 
 
Ms. Jenn Hanson [address] [facility] [address] [phone] To Whom It May Concern: 
 
Jenn Hanson is currently under the care of [facility]. She may return to work on Tuesday, November 12, 2019 with no restrictions. If there are questions or concerns please have the patient contact our office. Sincerely, Lenell Moritz, MD

## 2019-10-30 NOTE — PATIENT INSTRUCTIONS
If you have any questions or concerns about today's appointment, the verbal and/or written instructions you were given for follow up care, please call our office at [phone].     [facility] [place] [phone] office  [phone]

## 2019-10-30 NOTE — PROGRESS NOTES
Patient seen and examined  She is doing relatively well. However she is stating that she would like to take 1 week off before returning to work because she does a lot of heavy lifting at work  Return to work next week.   Follow-up with me as needed

## 2019-10-30 NOTE — LETTER
NOTIFICATION RETURN TO WORK / SCHOOL 
 
10/30/2019 8:44 AM 
 
 
 
Ms. Gray Wayne [address] [facility] [address] [phone] To Whom It May Concern: 
 
Gray Wayne is currently under the care of [facility]. She may return to work full duty with no restrictions on Tuesday, November 12, 2019. If there are questions or concerns please have the patient contact our office. Sincerely, Mayra Zaragoza MD

## 2019-10-30 NOTE — PROGRESS NOTES
Brady Peterson is a 61 y.o. female (: 1960) presenting to address:    Chief Complaint   Patient presents with    Surgical Follow-up     BBOBY drain removal    Abdominal Pain       Medication list and allergies have been reviewed with Brady Peterson and updated as of today's date. I have gone over all Medical, Surgical and Social History with Brady Peterson and updated/added the information accordingly. 1. Have you been to the ER, Urgent Care or Hospitalized since your last visit? NO      2. Have you followed up with your PCP or any other Physicians since your procedure/ last office visit?    Yes, Dr Sujata Gould 10/22 19 routine care

## 2019-10-31 ENCOUNTER — HOSPITAL ENCOUNTER (OUTPATIENT)
Dept: PHYSICAL THERAPY | Age: 59
Discharge: HOME | End: 2019-10-31
Payer: COMMERCIAL

## 2019-10-31 PROCEDURE — 97110 THERAPEUTIC EXERCISES: CPT

## 2019-10-31 NOTE — PROGRESS NOTES
OCCUPATIONAL THERAPY - DAILY TREATMENT NOTE    Patient Name: Alejandro Singer        Date: 10/31/2019  : 1960   YES Patient  Verified  Visit #:   3   of   10  Insurance: Payor: Landy Baeza / Plan: [place] [employer] [place] / Product Type: PPO /      In time: 11:00 Out time: 11:30   Total Treatment Time: 30     TREATMENT AREA =  Right hand/arm    SUBJECTIVE    Pain Level (on 0 to 10 scale):  sore  / 10   Medication Changes/New allergies or changes in medical history, any new surgeries or procedures? NO    If yes, update Summary List   Subjective Functional Status/Changes:  []  No changes reported     My hand is just sore. OBJECTIVE    30 min Therapeutic Exercise:  [x]  See flow sheet   Rationale:      increase ROM, increase strength and improve coordination to improve the patients ability to use right hand full duty      min Patient Education:  YES  Reviewed HEP   []  Progressed/Changed HEP based on: Other Objective/Functional Measures:    AAROM right shoulder flexion and digit flexion is WFL. She was able to manipulate coins with right hand. Post Treatment Pain Level (on 0 to 10) scale:   sore  / 10     ASSESSMENT  Assessment/Changes in Function:     Improving AROM and coordination right hand/shoulder AROM     []  See Progress Note/Recertification   Patient will continue to benefit from skilled OT services to address ROM deficits and address strength deficits to attain remaining goals.    Progress toward goals / Updated goals:    Met AROM goal to make a full fist     PLAN  [x]  Upgrade activities as tolerated YES Continue plan of care   []  Discharge due to :    []  Other:      Therapist: VIVIAN Robertson/JOCELYN    Date: 10/31/2019 Time: 11:01 AM

## 2019-11-05 ENCOUNTER — HOSPITAL ENCOUNTER (OUTPATIENT)
Dept: PHYSICAL THERAPY | Age: 59
Discharge: HOME | End: 2019-11-05
Payer: COMMERCIAL

## 2019-11-05 ENCOUNTER — APPOINTMENT (OUTPATIENT)
Dept: PHYSICAL THERAPY | Age: 59
End: 2019-11-05
Payer: COMMERCIAL

## 2019-11-05 PROCEDURE — 97110 THERAPEUTIC EXERCISES: CPT

## 2019-11-05 NOTE — PROGRESS NOTES
OCCUPATIONAL THERAPY - DAILY TREATMENT NOTE    Patient Name: Tonny Mariscal        Date: 2019  : 1960   YES Patient  Verified  Visit #:   4   of   10  Insurance: Payor: Maurice Dennis / Plan: [employer] PPO / Product Type: PPO /      In time: 4:00 Out time: 4:30   Total Treatment Time: 30     TREATMENT AREA =  Right hand/SH    SUBJECTIVE    Pain Level (on 0 to 10 scale):  sore  / 10   Medication Changes/New allergies or changes in medical history, any new surgeries or procedures? NO    If yes, update Summary List   Subjective Functional Status/Changes:  []  No changes reported     I'm going back to work next Tuesday. OBJECTIVE    30 min Therapeutic Exercise:  [x]  See flow sheet   Rationale:      increase ROM, increase strength and improve coordination to improve the patients ability to work full duty      min Patient Education:  YES  Reviewed HEP   [x]  Progressed/Changed HEP based on:   + green putty      Other Objective/Functional Measures:    AROM right SH is WFL. AROM right wrist is normal. Progressed strengthening exercises. Post Treatment Pain Level (on 0 to 10) scale:   0  / 10     ASSESSMENT  Assessment/Changes in Function:     Improving AROM right arm. Meeting goals      []  See Progress Note/Recertification   Patient will continue to benefit from skilled OT services to address strength deficits to attain remaining goals.    Progress toward goals / Updated goals:    Re-eval next visit      PLAN  [x]  Upgrade activities as tolerated YES Continue plan of care   []  Discharge due to :    []  Other:      Therapist: VIVIAN Walker/L    Date: 2019 Time: 4:00 PM

## 2019-11-07 ENCOUNTER — TELEPHONE (OUTPATIENT)
Dept: SURGERY | Age: 59
End: 2019-11-07

## 2019-11-07 ENCOUNTER — APPOINTMENT (OUTPATIENT)
Dept: PHYSICAL THERAPY | Age: 59
End: 2019-11-07
Payer: COMMERCIAL

## 2019-11-07 NOTE — TELEPHONE ENCOUNTER
Called Maritza Ruiz with [address] [place] [address] to inform that Dr Katia Andrea will not be back in the office until Monday 11/11/19. Marylene Delude faxed over verbal orders for plan of care and home health certification that need to be signed by Dr Katia Andrea. Forms were marked as urgent. Marylene Delude states ok to have forms signed on Monday and faxed back. Encounter will be closed.

## 2019-11-12 ENCOUNTER — HOSPITAL ENCOUNTER (OUTPATIENT)
Dept: PHYSICAL THERAPY | Age: 59
Discharge: HOME | End: 2019-11-12
Payer: COMMERCIAL

## 2019-11-12 PROCEDURE — 97110 THERAPEUTIC EXERCISES: CPT

## 2019-11-12 NOTE — PROGRESS NOTES
John Arana 31  [facility]  [address] [facility] [address], [address] - Phone: [phone]  Fax: [phone]  [address] [place] [address] THERAPY          Patient Name: Ed Anaya : 1960   Medical/Treatment Diagnosis: Right wrist pain [M25.531]   Onset Date: 2019    Referral Source: Kamini Baker MD [facility] 10/15/19   Prior Hospitalization: See Medical History Provider #: [id number]   Prior Level of Function: Ind;working    Comorbidities: Arthritis; diabetes   Medications: Verified on Patient Summary List   Visits from [facility]: 5 Missed Visits: 1       Goal/Measure of Progress Goal Met? 1. Full active fist to hold coins    Status at last Eval: Digits 1.5\" from palm; unable Current Status: Full active fist yes   2. Right SH flexion to 130 degrees for reach   Status at last Eval: 0-80 Current Status: 0-130;WFL yes   3. Decrease pain 2/10 with ADLs   Status at last Eval: 4/10 Current Status: 0 yes   4. Ind simple meal prep   Status at last Eval: na Current Status: Ind cooking  yes     Key Functional Changes/Progress: Patient is near/at baseline with RUE AROM and is Ind with ADLs/IADLs. She has returned to work. Assessments/Recommendations: Discontinue therapy. Progressing towards or have reached established goals. If you have any questions/comments please contact us directly at [phone]. Thank you for allowing us to assist in the care of your patient. Therapist Signature: VIVIAN Urbina/L Date: 19   Reporting Period: 10/15/19-19 Time: 5:49 PM       NOTE TO PHYSICIAN:  PLEASE COMPLETE THE ORDERS BELOW AND FAX TO   [facility]: [phone]. If you are unable to process this request in 24 hours please contact our office: [phone].    ___ I have read the above report and request that my patient be discharged from therapy.      Physician Signature:        Date: Time:

## 2019-11-12 NOTE — PROGRESS NOTES
OCCUPATIONAL THERAPY - DAILY TREATMENT NOTE    Patient Name: Tonny Mariscal        Date: 2019  : 1960   YES Patient  Verified  Visit #:   5   of   10  Insurance: Payor: Maurice Dennis / Plan: [place] [employer] P[employer] / Product Type: PPO /      In time: 4:05 Out time: 4:30   Total Treatment Time: 25     TREATMENT AREA =  Right wrist     SUBJECTIVE    Pain Level (on 0 to 10 scale):  0  / 10   Medication Changes/New allergies or changes in medical history, any new surgeries or procedures? NO    If yes, update Summary List   Subjective Functional Status/Changes:  []  No changes reported     I went back to work today. OBJECTIVE    25 min Therapeutic Exercise:  [x]  See flow sheet   Rationale:      increase ROM and increase strength to improve the patients ability to work        min Patient Education:  YES  Reviewed HEP   [x]  Progressed/Changed HEP based on:   + SH flex stretch     Other Objective/Functional Measures:    AROM right arm is WFL, SH flex 0-130. Right  36#. Reports Ind with ADLs and IADLs.      Post Treatment Pain Level (on 0 to 10) scale:   0  / 10     ASSESSMENT  Assessment/Changes in Function:     Doing well, goals met.     -x  See Progress Note/Recertification   Patient will continue to benefit from skilled OT services to na   Progress toward goals / Updated goals:    Goals met, DC to HEP     PLAN  []  Upgrade activities as tolerated NO Continue plan of care   [x]  Discharge due to : DC to HEP   []  Other:      Therapist: VIVIAN Walker/L    Date: 2019 Time: 5:49 PM

## 2019-11-20 ENCOUNTER — APPOINTMENT (OUTPATIENT)
Dept: GENERAL RADIOLOGY | Age: 59
End: 2019-11-20
Attending: EMERGENCY MEDICINE
Payer: COMMERCIAL

## 2019-11-20 ENCOUNTER — HOSPITAL ENCOUNTER (EMERGENCY)
Age: 59
Discharge: HOME | End: 2019-11-20
Attending: EMERGENCY MEDICINE
Payer: COMMERCIAL

## 2019-11-20 VITALS
BODY MASS INDEX: 25.61 KG/M2 | HEIGHT: 64 IN | TEMPERATURE: 97 F | WEIGHT: 150 LBS | OXYGEN SATURATION: 98 % | RESPIRATION RATE: 20 BRPM | SYSTOLIC BLOOD PRESSURE: 168 MMHG | DIASTOLIC BLOOD PRESSURE: 91 MMHG | HEART RATE: 132 BPM

## 2019-11-20 DIAGNOSIS — J45.41 MODERATE PERSISTENT ASTHMA WITH ACUTE EXACERBATION IN ADULT: Primary | ICD-10-CM

## 2019-11-20 LAB
ALBUMIN SERPL-MCNC: 3.4 G/DL (ref 3.4–5)
ALBUMIN/GLOB SERPL: 0.8 {RATIO} (ref 0.8–1.7)
ALP SERPL-CCNC: 167 U/L (ref 45–117)
ALT SERPL-CCNC: 32 U/L (ref 13–56)
ANION GAP SERPL CALC-SCNC: 6 MMOL/L (ref 3–18)
AST SERPL-CCNC: 17 U/L (ref 10–38)
BASOPHILS # BLD: 0 K/UL (ref 0–0.1)
BASOPHILS NFR BLD: 0 % (ref 0–2)
BILIRUB SERPL-MCNC: 0.2 MG/DL (ref 0.2–1)
BNP SERPL-MCNC: 429 PG/ML (ref 0–900)
BUN SERPL-MCNC: 12 MG/DL (ref 7–18)
BUN/CREAT SERPL: 10 (ref 12–20)
CALCIUM SERPL-MCNC: 9.2 MG/DL (ref 8.5–10.1)
CHLORIDE SERPL-SCNC: 109 MMOL/L (ref 100–111)
CO2 SERPL-SCNC: 26 MMOL/L (ref 21–32)
CREAT SERPL-MCNC: 1.16 MG/DL (ref 0.6–1.3)
DIFFERENTIAL METHOD BLD: ABNORMAL
EOSINOPHIL # BLD: 0.1 K/UL (ref 0–0.4)
EOSINOPHIL NFR BLD: 1 % (ref 0–5)
ERYTHROCYTE [DISTWIDTH] IN BLOOD BY AUTOMATED COUNT: 16.2 % (ref 11.6–14.5)
GLOBULIN SER CALC-MCNC: 4.3 G/DL (ref 2–4)
GLUCOSE SERPL-MCNC: 240 MG/DL (ref 74–99)
HCT VFR BLD AUTO: 42.2 % (ref 35–45)
HGB BLD-MCNC: 13.3 G/DL (ref 12–16)
LYMPHOCYTES # BLD: 2.3 K/UL (ref 0.9–3.6)
LYMPHOCYTES NFR BLD: 23 % (ref 21–52)
MCH RBC QN AUTO: 26.7 PG (ref 24–34)
MCHC RBC AUTO-ENTMCNC: 31.5 G/DL (ref 31–37)
MCV RBC AUTO: 84.7 FL (ref 74–97)
MONOCYTES # BLD: 0.7 K/UL (ref 0.05–1.2)
MONOCYTES NFR BLD: 7 % (ref 3–10)
NEUTS SEG # BLD: 6.7 K/UL (ref 1.8–8)
NEUTS SEG NFR BLD: 69 % (ref 40–73)
PLATELET # BLD AUTO: 306 K/UL (ref 135–420)
PMV BLD AUTO: 10.3 FL (ref 9.2–11.8)
POTASSIUM SERPL-SCNC: 3.3 MMOL/L (ref 3.5–5.5)
PROT SERPL-MCNC: 7.7 G/DL (ref 6.4–8.2)
RBC # BLD AUTO: 4.98 M/UL (ref 4.2–5.3)
SODIUM SERPL-SCNC: 141 MMOL/L (ref 136–145)
TROPONIN I SERPL-MCNC: <0.02 NG/ML (ref 0–0.04)
WBC # BLD AUTO: 9.8 K/UL (ref 4.6–13.2)

## 2019-11-20 PROCEDURE — 99284 EMERGENCY DEPT VISIT MOD MDM: CPT

## 2019-11-20 PROCEDURE — 83880 ASSAY OF NATRIURETIC PEPTIDE: CPT

## 2019-11-20 PROCEDURE — 85025 COMPLETE CBC W/AUTO DIFF WBC: CPT

## 2019-11-20 PROCEDURE — 74011250636 HC RX REV CODE- 250/636: Performed by: EMERGENCY MEDICINE

## 2019-11-20 PROCEDURE — 94640 AIRWAY INHALATION TREATMENT: CPT

## 2019-11-20 PROCEDURE — 84484 ASSAY OF TROPONIN QUANT: CPT

## 2019-11-20 PROCEDURE — 96375 TX/PRO/DX INJ NEW DRUG ADDON: CPT

## 2019-11-20 PROCEDURE — 80053 COMPREHEN METABOLIC PANEL: CPT

## 2019-11-20 PROCEDURE — 74011000250 HC RX REV CODE- 250: Performed by: EMERGENCY MEDICINE

## 2019-11-20 PROCEDURE — 96366 THER/PROPH/DIAG IV INF ADDON: CPT

## 2019-11-20 PROCEDURE — 96365 THER/PROPH/DIAG IV INF INIT: CPT

## 2019-11-20 PROCEDURE — 71045 X-RAY EXAM CHEST 1 VIEW: CPT

## 2019-11-20 RX ORDER — IPRATROPIUM BROMIDE AND ALBUTEROL SULFATE 2.5; .5 MG/3ML; MG/3ML
3 SOLUTION RESPIRATORY (INHALATION)
Status: COMPLETED | OUTPATIENT
Start: 2019-11-20 | End: 2019-11-20

## 2019-11-20 RX ORDER — ALBUTEROL SULFATE 90 UG/1
2 AEROSOL, METERED RESPIRATORY (INHALATION)
Qty: 1 INHALER | Refills: 1 | Status: SHIPPED | OUTPATIENT
Start: 2019-11-20 | End: 2020-01-08

## 2019-11-20 RX ORDER — ONDANSETRON 4 MG/1
4 TABLET, FILM COATED ORAL
Qty: 12 TAB | Refills: 0 | OUTPATIENT
Start: 2019-11-20 | End: 2020-01-08

## 2019-11-20 RX ORDER — GUAIFENESIN AND DEXTROMETHORPHAN HYDROBROMIDE 1200; 60 MG/1; MG/1
1 TABLET, EXTENDED RELEASE ORAL
Qty: 20 TAB | Refills: 0 | Status: SHIPPED | OUTPATIENT
Start: 2019-11-20 | End: 2020-02-27

## 2019-11-20 RX ORDER — DIPHENHYDRAMINE HYDROCHLORIDE 50 MG/ML
12.5 INJECTION, SOLUTION INTRAMUSCULAR; INTRAVENOUS
Status: DISCONTINUED | OUTPATIENT
Start: 2019-11-20 | End: 2019-11-21 | Stop reason: HOSPADM

## 2019-11-20 RX ORDER — MAGNESIUM SULFATE HEPTAHYDRATE 40 MG/ML
2 INJECTION, SOLUTION INTRAVENOUS ONCE
Status: COMPLETED | OUTPATIENT
Start: 2019-11-20 | End: 2019-11-20

## 2019-11-20 RX ORDER — PREDNISONE 50 MG/1
50 TABLET ORAL DAILY
Qty: 3 TAB | Refills: 0 | Status: SHIPPED | OUTPATIENT
Start: 2019-11-20 | End: 2019-11-23

## 2019-11-20 RX ORDER — ONDANSETRON 2 MG/ML
4 INJECTION INTRAMUSCULAR; INTRAVENOUS
Status: COMPLETED | OUTPATIENT
Start: 2019-11-20 | End: 2019-11-20

## 2019-11-20 RX ADMIN — METHYLPREDNISOLONE SODIUM SUCCINATE 125 MG: 125 INJECTION, POWDER, FOR SOLUTION INTRAMUSCULAR; INTRAVENOUS at 19:44

## 2019-11-20 RX ADMIN — IPRATROPIUM BROMIDE AND ALBUTEROL SULFATE 3 ML: .5; 3 SOLUTION RESPIRATORY (INHALATION) at 19:44

## 2019-11-20 RX ADMIN — SODIUM CHLORIDE 1000 ML: 900 INJECTION, SOLUTION INTRAVENOUS at 20:20

## 2019-11-20 RX ADMIN — MAGNESIUM SULFATE HEPTAHYDRATE 2 G: 40 INJECTION, SOLUTION INTRAVENOUS at 19:44

## 2019-11-20 RX ADMIN — ONDANSETRON 4 MG: 2 INJECTION INTRAMUSCULAR; INTRAVENOUS at 20:21

## 2019-11-20 NOTE — LETTER
NOTIFICATION RETURN TO WORK / SCHOOL 
 
11/20/2019 9:28 PM 
 
Ms. Eric Liu [address] [facility] [address] [id number] To Whom It May Concern: 
 
Eric Liu is currently under the care of [facility] EMERGENCY DEPT. She will return to work/school on: 11/22/19 If there are questions or concerns please have the patient contact our office. Sincerely, Josh Melchor MD

## 2019-11-20 NOTE — ED TRIAGE NOTES
Short of breath began today. Has uncontrollable cough and issues \"catching breath\". Dyspnea upon exertion. Appropriate/Calm

## 2019-11-21 NOTE — ED NOTES
Dyspnea, wheezing, chest tightness     I performed a brief evaluation, including history and physical, of the patient here in triage and I have determined that pt will need further treatment and evaluation from the main side ER physician. I have placed initial orders to help in expediting patients care.      November 20, 2019 at 7:01 PM - GUILLERMO Shaikh        Visit Vitals  BP (!) 163/110 (BP 1 Location: Left arm)   Pulse (!) 113   Temp 97 °F (36.1 °C)   Resp 22   Ht 5' 4\" (1.626 m)   Wt 68 kg (150 lb)   SpO2 98%   BMI 25.75 kg/m²

## 2019-11-21 NOTE — DISCHARGE INSTRUCTIONS
Patient Education        Asthma Attack: Care Instructions  Your Care Instructions    During an asthma attack, the airways swell and narrow. This makes it hard to breathe. Severe asthma attacks can be life-threatening, but you can help prevent them by keeping your asthma under control and treating symptoms before they get bad. Symptoms include being short of breath, having chest tightness, coughing, and wheezing. Noting and treating these symptoms can also help you avoid future trips to the emergency room. The doctor has checked you carefully, but problems can develop later. If you notice any problems or new symptoms, get medical treatment right away. Follow-up care is a key part of your treatment and safety. Be sure to make and go to all appointments, and call your doctor if you are having problems. It's also a good idea to know your test results and keep a list of the medicines you take. How can you care for yourself at home? · Follow your asthma action plan to prevent and treat attacks. If you don't have an asthma action plan, work with your doctor to create one. · Take your asthma medicines exactly as prescribed. Talk to your doctor right away if you have any questions about how to take them. ? Use your quick-relief medicine when you have symptoms of an attack. Quick-relief medicine is usually an albuterol inhaler. Some people need to use quick-relief medicine before they exercise. ? Take your controller medicine every day, not just when you have symptoms. Controller medicine is usually an inhaled corticosteroid. The goal is to prevent problems before they occur. Don't use your controller medicine to treat an attack that has already started. It doesn't work fast enough to help. ? If your doctor prescribed corticosteroid pills to use during an attack, take them exactly as prescribed. It may take hours for the pills to work, but they may make the episode shorter and help you breathe better. ?  Keep your quick-relief medicine with you at all times. · Talk to your doctor before using other medicines. Some medicines, such as aspirin, can cause asthma attacks in some people. · If you have a peak flow meter, use it to check how well you are breathing. This can help you predict when an asthma attack is going to occur. Then you can take medicine to prevent the asthma attack or make it less severe. · Do not smoke or allow others to smoke around you. Avoid smoky places. Smoking makes asthma worse. If you need help quitting, talk to your doctor about stop-smoking programs and medicines. These can increase your chances of quitting for good. · Learn what triggers an asthma attack for you, and avoid the triggers when you can. Common triggers include colds, smoke, air pollution, dust, pollen, mold, pets, cockroaches, stress, and cold air. · Avoid colds and the flu. Get a pneumococcal vaccine shot. If you have had one before, ask your doctor if you need a second dose. Get a flu vaccine every fall. If you must be around people with colds or the flu, wash your hands often. When should you call for help? Call 911 anytime you think you may need emergency care. For example, call if:    · You have severe trouble breathing.    Call your doctor now or seek immediate medical care if:    · Your symptoms do not get better after you have followed your asthma action plan.     · You have new or worse trouble breathing.     · Your coughing and wheezing get worse.     · You cough up dark brown or bloody mucus (sputum).     · You have a new or higher fever.    Watch closely for changes in your health, and be sure to contact your doctor if:    · You need to use quick-relief medicine on more than 2 days a week (unless it is just for exercise).     · You cough more deeply or more often, especially if you notice more mucus or a change in the color of your mucus.     · You are not getting better as expected. Where can you learn more?   Go to http://betzy-didi.info/. Enter V926 in the search box to learn more about \"Asthma Attack: Care Instructions. \"  Current as of: June 9, 2019  Content Version: 12.2  © 8739-5718 [employer]. Care instructions adapted under license by [employer] (which disclaims liability or warranty for this information). If you have questions about a medical condition or this instruction, always ask your healthcare professional. [place] [address] any warranty or liability for your use of this information. Patient Education        Asthma in Adults: Care Instructions  Your Care Instructions    During an asthma attack, your airways swell and narrow as a reaction to certain things (triggers). This makes it hard to breathe. You may be able to prevent asthma attacks if you avoid the things that set off your asthma symptoms. Keeping your asthma under control and treating symptoms before they get bad can help you avoid severe attacks. If you can control your asthma, you may be able to do all of your normal daily activities. You may also avoid asthma attacks and trips to the hospital.  Follow-up care is a key part of your treatment and safety. Be sure to make and go to all appointments, and call your doctor if you are having problems. It's also a good idea to know your test results and keep a list of the medicines you take. How can you care for yourself at home? · Follow your asthma action plan so you can manage your symptoms at home. An asthma action plan will help you prevent and control airway reactions and will tell you what to do during an asthma attack. If you do not have an asthma action plan, work with your doctor to build one. · Take your asthma medicine exactly as prescribed. Medicine plays an important role in controlling asthma. Talk to your doctor right away if you have any questions about what to take and how to take it. ?  Use your quick-relief medicine when you have symptoms of an attack. Quick-relief medicine often is an albuterol inhaler. Some people need to use quick-relief medicine before they exercise. ? Take your controller medicine every day, not just when you have symptoms. Controller medicine is usually an inhaled corticosteroid. The goal is to prevent problems before they occur. Do not use your controller medicine to try to treat an attack that has already started. It does not work fast enough to help. ? If your doctor prescribed corticosteroid pills to use during an attack, take them as directed. They may take hours to work, but they may shorten the attack and help you breathe better. ? Keep your quick-relief medicine with you at all times. · Talk to your doctor before using other medicines. Some medicines, such as aspirin, can cause asthma attacks in some people. · Check yourself for asthma symptoms to know which step to follow in your action plan. Watch for things like being short of breath, having chest tightness, coughing, and wheezing. Also notice if symptoms wake you up at night or if you get tired quickly when you exercise. · If you have a peak flow meter, use it to check how well you are breathing. This can help you predict when an asthma attack is going to occur. Then you can take medicine to prevent the asthma attack or make it less severe. · See your doctor regularly. These visits will help you learn more about asthma and what you can do to control it. Your doctor will monitor your treatment to make sure the medicine is helping you. · Keep track of your asthma attacks and your treatment. After you have had an attack, write down what triggered it, what helped end it, and any concerns you have about your asthma action plan. Take your diary when you see your doctor. You can then review your asthma action plan and decide if it is working. · Do not smoke or allow others to smoke around you. Avoid smoky places. Smoking makes asthma worse. If you need help quitting, talk to your doctor about stop-smoking programs and medicines. These can increase your chances of quitting for good. · Learn what triggers an asthma attack for you, and avoid the triggers when you can. Common triggers include colds, smoke, air pollution, dust, pollen, mold, pets, cockroaches, stress, and cold air. · Avoid colds and the flu. Get a pneumococcal vaccine shot. If you have had one before, ask your doctor whether you need a second dose. Get a flu vaccine every fall. If you must be around people with colds or the flu, wash your hands often. When should you call for help? Call 911 anytime you think you may need emergency care. For example, call if:    · You have severe trouble breathing.    Call your doctor now or seek immediate medical care if:    · Your symptoms do not get better after you have followed your asthma action plan.     · You cough up yellow, dark brown, or bloody mucus (sputum).    Watch closely for changes in your health, and be sure to contact your doctor if:    · Your coughing and wheezing get worse.     · You need to use quick-relief medicine on more than 2 days a week (unless it is just for exercise).     · You need help figuring out what is triggering your asthma attacks. Where can you learn more? Go to http://betzy-didi.info/. Enter P597 in the search box to learn more about \"Asthma in Adults: Care Instructions. \"  Current as of: June 9, 2019  Content Version: 12.2  © 1083-4453 [employer]. Care instructions adapted under license by [employer] (which disclaims liability or warranty for this information). If you have questions about a medical condition or this instruction, always ask your healthcare professional. [place] [address] any warranty or liability for your use of this information.

## 2019-11-21 NOTE — ED PROVIDER NOTES
Radha Harris is a 61 y.o. female with history of asthma chronic abdominal pain with complaints of increased shortness of breath and wheezing started while at work today. Patient has a cough with clear sputum. She denies any hemoptysis. She has no fever. Patient had a little bit of diarrhea earlier. There is no blood in her stool. She did get nauseated as well. She does not have access to her inhaler so she came here. She has not had any significant issues with asthma recently. She does have chest discomfort which is more tightness in nature. She has no new swelling or edema. She has no prior history of congestive heart failure. Symptoms are worse with deep breathing and exertion. The history is provided by the patient and medical records.         Past Medical History:   Diagnosis Date    Abdominal adhesions     Adnexal cyst 7/30/2019    Left    Anemia     Asthma     Chronic pelvic pain in female 7/30/2019    Diabetes mellitus     Dyskinesia     bilateral    Essential hypertension     GERD (gastroesophageal reflux disease)     Hypertension     Menopause     Microhematuria     Rheumatoid arteritis ([facility] [address] 2018    Stool color black        Past Surgical History:   Procedure Laterality Date    COLONOSCOPY N/A 1/21/2019    COLON[facility] performed by El[facility]vin [facility] at [facility] ENDOSCOPY    HX CHOLECYSTECTOMY  6/28/10    HX COLONOSCOPY      HX ENDOSCOPY      HX HERNIA REPAIR      HX HYSTERECTOMY  1989    Fibroids    HX KNEE REPLACEMENT Left     HX KNEE REPLACEMENT Right 06/2018    HX OOPHORECTOMY  12/2000    HX ORTHOPAEDIC Right     ankle- multiple surgeries    HX SMALL BOWEL RESECTION  12/2000    HX SMALL BOWEL RESECTION  02/21/2017    Dr. Sajan Orosco           Family History:   Problem Relation Age of Onset    Hypertension Other         parent    Breast Cancer Other 21    Heart Disease Father     Diabetes Father     Lung Disease Father    Bonita Rebolledo Diabetes Mother     Hypertension Other         sibling    Diabetes Other         parent    Kidney Disease Brother     Diabetes Brother     Lung Disease Brother        Social History     Socioeconomic History    Marital status:      Spouse name: Not on file    Number of children: Not on file    Years of education: Not on file    Highest education level: Not on file   Occupational History    Not on file   Social Needs    Financial resource strain: Not on file    Food insecurity:     Worry: Not on file     Inability: Not on file    Transportation needs:     Medical: Not on file     Non-medical: Not on file   Tobacco Use    Smoking status: Never Smoker    Smokeless tobacco: Never Used   Substance and Sexual Activity    Alcohol use: No    Drug use: No    Sexual activity: Yes     Partners: Male     Birth control/protection: None   Lifestyle    Physical activity:     Days per week: Not on file     Minutes per session: Not on file    Stress: Not on file   Relationships    Social connections:     Talks on phone: Not on file     Gets together: Not on file     Attends Judaism service: Not on file     Active member of club or organization: Not on file     Attends meetings of clubs or organizations: Not on file     Relationship status: Not on file    Intimate partner violence:     Fear of current or ex partner: Not on file     Emotionally abused: Not on file     Physically abused: Not on file     Forced sexual activity: Not on file   Other Topics Concern    Not on file   Social History Narrative    Not on file         ALLERGIES: Macrodantin [nitrofurantoin macrocrystalline] and Tape [adhesive]    Review of Systems   Constitutional: Negative for fever. HENT: Positive for congestion and postnasal drip. Eyes: Negative for visual disturbance. Respiratory: Positive for cough, chest tightness, shortness of breath and wheezing. Cardiovascular: Negative for leg swelling.    Gastrointestinal: Positive for diarrhea and nausea. Endocrine: Negative for polyuria. Genitourinary: Negative for difficulty urinating. Musculoskeletal: Negative for gait problem. Skin: Negative for rash. Neurological: Negative for syncope. Psychiatric/Behavioral: Positive for sleep disturbance. Vitals:    11/20/19 1857 11/20/19 1941   BP: (!) 163/110    Pulse: (!) 113    Resp: 22    Temp: 97 °F (36.1 °C)    SpO2: 98% 98%   Weight: 68 kg (150 lb)    Height: 5' 4\" (1.626 m)             Physical Exam  Vitals signs and nursing note reviewed. Constitutional:       General: She is in acute distress. Appearance: She is well-developed. She is not diaphoretic. HENT:      Head: Normocephalic and atraumatic. Right Ear: External ear normal.      Left Ear: External ear normal.      Nose: Nose normal.      Mouth/Throat:      Pharynx: Uvula midline. Eyes:      General: No scleral icterus. Conjunctiva/sclera: Conjunctivae normal.   Neck:      Musculoskeletal: Neck supple. Cardiovascular:      Rate and Rhythm: Regular rhythm. Tachycardia present. Heart sounds: Normal heart sounds. Pulmonary:      Effort: Respiratory distress present. Breath sounds: Wheezing present. Chest:      Chest wall: Tenderness present. Abdominal:      Palpations: Abdomen is soft. Tenderness: There is no tenderness. Skin:     General: Skin is warm and dry. Capillary Refill: Capillary refill takes less than 2 seconds. Neurological:      Mental Status: She is alert and oriented to person, place, and time.       Gait: Gait normal.   Psychiatric:         Behavior: Behavior normal.          MDM       Procedures    Vitals:  Patient Vitals for the past 12 hrs:   Temp Pulse Resp BP SpO2   11/20/19 1941     98 %   11/20/19 1857 97 °F (36.1 °C) (!) 113 22 (!) 163/110 98 %         Medications ordered:   Medications   sodium chloride 0.9 % bolus infusion 1,000 mL (has no administration in time range)   ondansetron [facility]) injection 4 mg (has no administration in time range)   albuterol-ipratropium (DUO-NEB) 2.5 MG-0.5 MG/3 ML (3 mL Nebulization Given 11/20/19 1944)   albuterol-ipratropium (DUO-NEB) 2.5 MG-0.5 MG/3 ML (3 mL Nebulization Given 11/20/19 1944)   methylPREDNISolone (PF) (Solu-MEDROL) injection 125 mg (125 mg IntraVENous Given 11/20/19 1944)   magnesium sulfate 2 g/50 ml IVPB (premix or compounded) (2 g IntraVENous New Bag 11/20/19 1944)         Lab findings:  Recent Results (from the past 12 hour(s))   CBC WITH AUTOMATED DIFF    Collection Time: 11/20/19  7:32 PM   Result Value Ref Range    WBC 9.8 4.6 - 13.2 K/uL    RBC 4.98 4.20 - 5.30 M/uL    HGB 13.3 12.0 - 16.0 g/dL    HCT 42.2 35.0 - 45.0 %    MCV 84.7 74.0 - 97.0 FL    MCH 26.7 24.0 - 34.0 PG    MCHC 31.5 31.0 - 37.0 g/dL    RDW 16.2 (H) 11.6 - 14.5 %    PLATELET 060 248 - 883 K/uL    MPV 10.3 9.2 - 11.8 FL    NEUTROPHILS 69 40 - 73 %    LYMPHOCYTES 23 21 - 52 %    MONOCYTES 7 3 - 10 %    EOSINOPHILS 1 0 - 5 %    BASOPHILS 0 0 - 2 %    ABS. NEUTROPHILS 6.7 1.8 - 8.0 K/UL    ABS. LYMPHOCYTES 2.3 0.9 - 3.6 K/UL    ABS. MONOCYTES 0.7 0.05 - 1.2 K/UL    ABS. EOSINOPHILS 0.1 0.0 - 0.4 K/UL    ABS. BASOPHILS 0.0 0.0 - 0.1 K/UL    DF AUTOMATED     METABOLIC PANEL, COMPREHENSIVE    Collection Time: 11/20/19  7:32 PM   Result Value Ref Range    Sodium 141 136 - 145 mmol/L    Potassium 3.3 (L) 3.5 - 5.5 mmol/L    Chloride 109 100 - 111 mmol/L    CO2 26 21 - 32 mmol/L    Anion gap 6 3.0 - 18 mmol/L    Glucose 240 (H) 74 - 99 mg/dL    BUN 12 7.0 - 18 MG/DL    Creatinine 1.16 0.6 - 1.3 MG/DL    BUN/Creatinine ratio 10 (L) 12 - 20      GFR est AA 58 (L) >60 ml/min/1.73m2    GFR est non-AA 48 (L) >60 ml/min/1.73m2    Calcium 9.2 8.5 - 10.1 MG/DL    Bilirubin, total 0.2 0.2 - 1.0 MG/DL    ALT (SGPT) 32 13 - 56 U/L    AST (SGOT) 17 10 - 38 U/L    Alk.  phosphatase 167 (H) 45 - 117 U/L    Protein, total 7.7 6.4 - 8.2 g/dL    Albumin 3.4 3.4 - 5.0 g/dL    Globulin 4.3 (H) 2.0 - 4.0 g/dL    A-G Ratio 0.8 0.8 - 1.7     NT-PRO BNP    Collection Time: 11/20/19  7:32 PM   Result Value Ref Range    NT pro- 0 - 900 PG/ML   TROPONIN I    Collection Time: 11/20/19  7:32 PM   Result Value Ref Range    Troponin-I, QT <0.02 0.0 - 0.045 NG/ML       EKG interpretation by ED Physician:      X-Ray, CT or other radiology findings or impressions:  XR CHEST PORT    (Results Pending)   No acute process per my interpretation    Progress notes, Consult notes or additional Procedure notes:   Wheezing has significantly proved here. Patient is mildly more tachycardic but this is slightly secondary to her albuterol. Do feel patient has an acute pulmonary infection or need for admission. Will place on short course of prednisone    I have discussed with patient and/or family/sig other the results, interpretation of any imaging if performed, suspected diagnosis and treatment plan to include instructions regarding the diagnoses listed to which understanding was expressed with all questions answered      Reevaluation of patient:   stable    Disposition:  Diagnosis:   1. Moderate persistent asthma with acute exacerbation in adult        Disposition: home      Follow-up Information    None           Patient's Medications   Start Taking    No medications on file   Continue Taking    ALBUTEROL (PROVENTIL HFA, VENTOLIN HFA, PROAIR HFA) 90 MCG/ACTUATION INHALER    Take 1 Puff by inhalation every six (6) hours as needed for Wheezing. ALLERGY RELIEF, CETIRIZINE, 10 MG TABLET    take 1 tablet by mouth once daily    AMLODIPINE (NORVASC) 2.5 MG TABLET    take 1 tablet by mouth once daily NIGHTLY    BISOPROLOL-HYDROCHLOROTHIAZIDE (ZIAC) 2.5-6.25 MG PER TABLET    Take 1 Tab by mouth daily. CYCLOBENZAPRINE (FLEXERIL) 10 MG TABLET    Take 1 Tab by mouth three (3) times daily as needed for Muscle Spasm(s). DOXEPIN (SINEQUAN) 25 MG CAPSULE    Take 1 Cap by mouth nightly.     FARXIGA 10 MG TAB TABLET    take 1 tablet by mouth once daily    FOLIC ACID (FOLVITE) 1 MG TABLET    Take  by mouth daily. GLUCOSE BLOOD VI TEST STRIPS (ONETOUCH ULTRA TEST) STRIP    Check blood glucose as directed    METFORMIN (GLUMETZA ER) 500 MG TG24 24 HOUR TABLET    Take 1,000 mg by mouth two (2) times a day. take 1 tablet by mouth twice a day    METHOTREXATE (RHEUMATREX) 2.5 MG TABLET    Take 7.5 mg by mouth every Tuesday. MULTIVITAMIN (ONE A DAY) TABLET    Take 1 Tab by mouth daily. OMEPRAZOLE (PRILOSEC) 10 MG CAPSULE    take 1 capsule by mouth once daily    ONDANSETRON HCL (ZOFRAN) 4 MG TABLET    Take 1 Tab by mouth every eight (8) hours as needed for Nausea. POLYETHYLENE GLYCOL (MIRALAX) 17 GRAM/DOSE POWDER    Take 17 g by mouth daily. 1 tablespoon with 8 oz of water daily    SULFASALAZINE (AZULFIDINE) 500 MG TABLET    Take 500 mg by mouth two (2) times a day.    These Medications have changed    No medications on file   Stop Taking    No medications on file

## 2019-11-22 ENCOUNTER — CLINICAL SUPPORT (OUTPATIENT)
Dept: FAMILY MEDICINE CLINIC | Age: 59
End: 2019-11-22

## 2019-11-22 VITALS
TEMPERATURE: 98.2 F | DIASTOLIC BLOOD PRESSURE: 94 MMHG | OXYGEN SATURATION: 99 % | BODY MASS INDEX: 26.53 KG/M2 | HEART RATE: 91 BPM | HEIGHT: 64 IN | RESPIRATION RATE: 18 BRPM | WEIGHT: 155.4 LBS | SYSTOLIC BLOOD PRESSURE: 149 MMHG

## 2019-11-22 DIAGNOSIS — J45.40 MODERATE PERSISTENT ASTHMA WITHOUT COMPLICATION: ICD-10-CM

## 2019-11-22 DIAGNOSIS — I10 ESSENTIAL HYPERTENSION, BENIGN: Primary | ICD-10-CM

## 2019-11-22 RX ORDER — AMLODIPINE BESYLATE 5 MG/1
5 TABLET ORAL DAILY
Qty: 30 TAB | Refills: 0 | Status: SHIPPED | OUTPATIENT
Start: 2019-11-22 | End: 2020-02-27 | Stop reason: SDUPTHER

## 2019-11-22 RX ORDER — FLUTICASONE PROPIONATE 110 UG/1
1 AEROSOL, METERED RESPIRATORY (INHALATION) EVERY 12 HOURS
Qty: 1 INHALER | Refills: 0 | Status: SHIPPED | OUTPATIENT
Start: 2019-11-22 | End: 2019-12-09 | Stop reason: ALTCHOICE

## 2019-11-22 NOTE — PROGRESS NOTES
History of Present Illness  Eric Liu is a 61 y.o. female who presents today for management of    Chief Complaint   Patient presents with    Blood Pressure Check       Patient of Dr. Caesar Lawson, presenting today for blood pressure check. BP at the office is 167/113 --> 149/94  Patient is currently taking prednisone for asthma exacerbation (started 2 days ago at the ER). Diet and Lifestyle: generally follows a low fat low cholesterol diet, generally follows a low sodium diet, sedentary, nonsmoker  Home BP Monitoring: is not measured at home. Pertinent ROS: taking medications as instructed, no medication side effects noted, no TIA's, no chest pain on exertion, no dyspnea on exertion, no swelling of ankles. She is requesting a letter to return to work.     Problem List  Patient Active Problem List    Diagnosis Date Noted    Anemia 08/10/2019    S/P small bowel resection 08/01/2019    Adnexal cyst 07/30/2019    Chronic pelvic pain in female 07/30/2019    Bowel obstruction ([address] 08/23/2018    Non-intractable cyclical vomiting with nausea 01/24/2018    Type 2 diabetes mellitus with nephropathy ([address] 01/19/2018    Chronic abdominal pain 03/14/2017    Post-operative pain 03/14/2017    SBO (small bowel obstruction) ([address] 02/21/2017    Osteoarthritis of left knee 06/27/2016    Hypertension 06/27/2016    Hyperlipidemia 06/27/2016    GERD (gastroesophageal reflux disease) 06/27/2016    Asthma 06/27/2016    Type 2 diabetes mellitus ([address] 06/27/2016    Sural neuritis 06/23/2014    Chest pain 04/20/2014    Essential hypertension, benign 02/27/2012    Generalized abdominal pain        Past Medical History  Past Medical History:   Diagnosis Date    Abdominal adhesions     Adnexal cyst 7/30/2019    Left    Anemia     Asthma     Chronic pelvic pain in female 7/30/2019    Diabetes mellitus     Dyskinesia     bilateral    Essential hypertension     GERD (gastroesophageal reflux disease)     Hypertension     Menopause     Microhematuria     Rheumatoid arteritis ([facility] [address] 2018    Stool color black         Surgical History  Past Surgical History:   Procedure Laterality Date    COLONOSCOPY N/A 1/21/2019    COLONOSCOPY performed by Mendu, Lindia Kocher, [facility] at [facility] ENDOSCOPY    HX CHOLECYSTECTOMY  6/28/10    HX COLONOSCOPY      HX ENDOSCOPY      HX HERNIA REPAIR      HX HYSTERECTOMY  1989    Fibroids    HX KNEE REPLACEMENT Left     HX KNEE REPLACEMENT Right 06/2018    HX OOPHORECTOMY  12/2000    HX ORTHOPAEDIC Right     ankle- multiple surgeries    HX SMALL BOWEL RESECTION  12/2000    HX SMALL BOWEL RESECTION  02/21/2017    Dr. Susu Montez          Current Medications  Current Outpatient Medications   Medication Sig    amLODIPine (NORVASC) 5 mg tablet Take 1 Tab by mouth daily.  fluticasone propionate (FLOVENT HFA) 110 mcg/actuation inhaler Take 1 Puff by inhalation every twelve (12) hours.  ondansetron hcl (ZOFRAN) 4 mg tablet Take 1 Tab by mouth every eight (8) hours as needed for Nausea.  albuterol (PROVENTIL HFA, VENTOLIN HFA, PROAIR HFA) 90 mcg/actuation inhaler Take 2 Puffs by inhalation every four (4) hours as needed for Wheezing.  predniSONE (DELTASONE) 50 mg tablet Take 1 Tab by mouth daily for 3 days.  cyclobenzaprine (FLEXERIL) 10 mg tablet Take 1 Tab by mouth three (3) times daily as needed for Muscle Spasm(s).  doxepin (SINEQUAN) 25 mg capsule Take 1 Cap by mouth nightly.  metFORMIN (GLUMETZA ER) 500 mg TG24 24 hour tablet Take 1,000 mg by mouth two (2) times a day. take 1 tablet by mouth twice a day    multivitamin (ONE A DAY) tablet Take 1 Tab by mouth daily.  ALLERGY RELIEF, CETIRIZINE, 10 mg tablet take 1 tablet by mouth once daily    bisoprolol-hydroCHLOROthiazide (ZIAC) 2.5-6.25 mg per tablet Take 1 Tab by mouth daily.     omeprazole (PRILOSEC) 10 mg capsule take 1 capsule by mouth once daily    FARXIGA 10 mg tab tablet take 1 tablet by mouth once daily    folic acid (FOLVITE) 1 mg tablet Take  by mouth daily.  methotrexate (RHEUMATREX) 2.5 mg tablet Take 7.5 mg by mouth every Tuesday.  sulfaSALAzine (AZULFIDINE) 500 mg tablet Take 500 mg by mouth two (2) times a day.  glucose blood VI test strips (ONETOUCH ULTRA TEST) strip Check blood glucose as directed    dextromethorphan-guaiFENesin (MUCINEX DM) 60-1,200 mg Tb12 Take 1 Tab by mouth every twelve (12) hours as needed for Cough.  polyethylene glycol (MIRALAX) 17 gram/dose powder Take 17 g by mouth daily. 1 tablespoon with 8 oz of water daily     No current facility-administered medications for this visit.         Allergies/Drug Reactions  Allergies   Allergen Reactions    Macrodantin [Nitrofurantoin Macrocrystalline] Itching and Other (comments)    Tape [Adhesive] Other (comments)     Paper tape-- feels like burning skin  Burned skin when had wound vac        Family History  Family History   Problem Relation Age of Onset    Hypertension Other         parent    Breast Cancer Other 21    Heart Disease Father     Diabetes Father     Lung Disease Father     Diabetes Mother     Hypertension Other         sibling    Diabetes Other         parent    Kidney Disease Brother     Diabetes Brother     Lung Disease Brother         Social History  Social History     Socioeconomic History    Marital status:      Spouse name: Not on file    Number of children: Not on file    Years of education: Not on file    Highest education level: Not on file   Occupational History    Not on file   Social Needs    Financial resource strain: Not on file    Food insecurity:     Worry: Not on file     Inability: Not on file    Transportation needs:     Medical: Not on file     Non-medical: Not on file   Tobacco Use    Smoking status: Never Smoker    Smokeless tobacco: Never Used   Substance and Sexual Activity    Alcohol use: No    Drug use: No    Sexual activity: Yes Partners: Male     Birth control/protection: None   Lifestyle    Physical activity:     Days per week: Not on file     Minutes per session: Not on file    Stress: Not on file   Relationships    Social connections:     Talks on phone: Not on file     Gets together: Not on file     Attends Confucianism service: Not on file     Active member of club or organization: Not on file     Attends meetings of clubs or organizations: Not on file     Relationship status: Not on file    Intimate partner violence:     Fear of current or ex partner: Not on file     Emotionally abused: Not on file     Physically abused: Not on file     Forced sexual activity: Not on file   Other Topics Concern    Not on file   Social History Narrative    Not on file       Review of Systems  Negative except as mentioned in HPI      Physical Exam  Vital signs:   Vitals:    11/22/19 1049 11/22/19 1052   BP: (!) 167/113 (!) 149/94   Pulse: 91    Resp: 18    Temp: 98.2 °F (36.8 °C)    TempSrc: Oral    SpO2: 99%    Weight: 155 lb 6.4 oz (70.5 kg)    Height: 5' 4\" (1.626 m)        General: alert, oriented, not in distress  Eyes: clear conjunctivae, anicteric sclerae, full and equal ROMs  Chest/Lungs: clear breath sounds, no wheezing or crackles  Heart: normal rate, regular rhythm, no murmur  Extremities: no focal deformities, no edema  Neuro: AAOx3, CN's grossly intact  Skin: no visible abnormalities      Laboratory/Tests:  Component      Latest Ref Rng & Units 11/20/2019 11/20/2019 11/20/2019           7:32 PM  7:32 PM  7:32 PM   WBC      4.6 - 13.2 K/uL   9.8   RBC      4.20 - 5.30 M/uL   4.98   HGB      12.0 - 16.0 g/dL   13.3   HCT      35.0 - 45.0 %   42.2   MCV      74.0 - 97.0 FL   84.7   MCH      24.0 - 34.0 PG   26.7   MCHC      31.0 - 37.0 g/dL   31.5   RDW      11.6 - 14.5 %   16.2 (H)   PLATELET      207 - 279 K/uL   306   MPV      9.2 - 11.8 FL   10.3   NEUTROPHILS      40 - 73 %   69   LYMPHOCYTES      21 - 52 %   23   MONOCYTES      3 - 10 %   7   EOSINOPHILS      0 - 5 %   1   BASOPHILS      0 - 2 %   0   ABS. NEUTROPHILS      1.8 - 8.0 K/UL   6.7   ABS. LYMPHOCYTES      0.9 - 3.6 K/UL   2.3   ABS. MONOCYTES      0.05 - 1.2 K/UL   0.7   ABS. EOSINOPHILS      0.0 - 0.4 K/UL   0.1   ABS. BASOPHILS      0.0 - 0.1 K/UL   0.0   DF         AUTOMATED   Sodium      136 - 145 mmol/L  141    Potassium      3.5 - 5.5 mmol/L  3.3 (L)    Chloride      100 - 111 mmol/L  109    CO2      21 - 32 mmol/L  26    Anion gap      3.0 - 18 mmol/L  6    Glucose      74 - 99 mg/dL  240 (H)    BUN      7.0 - 18 MG/DL  12    Creatinine      0.6 - 1.3 MG/DL  1.16    BUN/Creatinine ratio      12 - 20    10 (L)    GFR est AA      >60 ml/min/1.73m2  58 (L)    GFR est non-AA      >60 ml/min/1.73m2  48 (L)    Calcium      8.5 - 10.1 MG/DL  9.2    Bilirubin, total      0.2 - 1.0 MG/DL  0.2    ALT (SGPT)      13 - 56 U/L  32    AST      10 - 38 U/L  17    Alk. phosphatase      45 - 117 U/L  167 (H)    Protein, total      6.4 - 8.2 g/dL  7.7    Albumin      3.4 - 5.0 g/dL  3.4    Globulin      2.0 - 4.0 g/dL  4.3 (H)    A-G Ratio      0.8 - 1.7    0.8    NT pro-BNP      0 - 900 PG/         Assessment/Plan:    1. Essential hypertension, benign  - BP elevated likely from prednisone use  - will slightly increase amLODIPine (NORVASC) 5 mg tablet; Take 1 Tab by mouth daily. Dispense: 30 Tab; Refill: 0  - continue Ziac    2. Moderate persistent asthma without complication  - PRN albuterol  - start fluticasone propionate (FLOVENT HFA) 110 mcg/actuation inhaler; Take 1 Puff by inhalation every twelve (12) hours. Dispense: 1 Inhaler; Refill: 0  - finish course of prednisone    Return to work note handed to patient. Follow-up and Dispositions    · Return in about 17 days (around 12/9/2019) for follow-up with PCP. I have discussed the diagnosis with the patient and the intended plan as seen in the above orders.   The patient has received an after-visit summary and questions were answered concerning future plans. I have discussed medication side effects and warnings with the patient as well. I have reviewed the plan of care with the patient, accepted their input and they are in agreement with the treatment goals.        Rick Newsome MD  November 22, 2019

## 2019-11-22 NOTE — LETTER
NOTIFICATION RETURN TO WORK / SCHOOL 
 
11/22/2019 10:57 AM 
 
Ms. Hector Stevens [address] [id number] To Whom It May Concern: 
 
Hector Stevens is currently under the care of [facility] Rashid Carpio. She will return to work/school on: 11/22/2019 If there are questions or concerns please have the patient contact our office. Sincerely, Siena Gallardo MD

## 2019-12-09 ENCOUNTER — OFFICE VISIT (OUTPATIENT)
Dept: FAMILY MEDICINE CLINIC | Age: 59
End: 2019-12-09

## 2019-12-09 VITALS
WEIGHT: 152.8 LBS | HEART RATE: 92 BPM | DIASTOLIC BLOOD PRESSURE: 89 MMHG | HEIGHT: 64 IN | RESPIRATION RATE: 18 BRPM | TEMPERATURE: 97 F | SYSTOLIC BLOOD PRESSURE: 130 MMHG | OXYGEN SATURATION: 97 % | BODY MASS INDEX: 26.09 KG/M2

## 2019-12-09 DIAGNOSIS — J45.40 MODERATE PERSISTENT ASTHMA WITHOUT COMPLICATION: Primary | ICD-10-CM

## 2019-12-09 DIAGNOSIS — G89.29 INSOMNIA SECONDARY TO CHRONIC PAIN: ICD-10-CM

## 2019-12-09 DIAGNOSIS — G47.01 INSOMNIA SECONDARY TO CHRONIC PAIN: ICD-10-CM

## 2019-12-09 DIAGNOSIS — I10 ESSENTIAL HYPERTENSION, BENIGN: ICD-10-CM

## 2019-12-09 DIAGNOSIS — M17.12 PRIMARY OSTEOARTHRITIS OF LEFT KNEE: ICD-10-CM

## 2019-12-09 DIAGNOSIS — Z87.19 S/P SMALL BOWEL OBSTRUCTION: ICD-10-CM

## 2019-12-09 RX ORDER — BUDESONIDE AND FORMOTEROL FUMARATE DIHYDRATE 160; 4.5 UG/1; UG/1
2 AEROSOL RESPIRATORY (INHALATION) 2 TIMES DAILY
Qty: 1 INHALER | Refills: 2 | Status: ON HOLD | OUTPATIENT
Start: 2019-12-09 | End: 2020-09-12

## 2019-12-09 RX ORDER — ACETAMINOPHEN AND CODEINE PHOSPHATE 300; 30 MG/1; MG/1
1 TABLET ORAL
Qty: 12 TAB | Refills: 0 | Status: SHIPPED | OUTPATIENT
Start: 2019-12-09 | End: 2019-12-12

## 2019-12-09 NOTE — PROGRESS NOTES
Johnny Patino is a 61 y.o.  female and presents with    Chief Complaint   Patient presents with    Asthma     Subjective:  Mrs. Gema Cain presents for f/u after ER visit for asthma attack; she received 2 nebulizer treatments and had some improvement. She started flovent and has not had much relief; she has used albuterol rescue twice a day. She feels like she cannot clear phlegm in her chest; she reports that she was given a prescription for mucinex but she never got the medication. She has abdominal pain s/p BOBBY drain site adjustment. She denies fever or chills. Depression Review:  Patient is seen for followup of depression. Treatment includes none and no other therapies. Ongoing symptoms include depressed mood, anhedonia, insomnia, fatigue, feelings of worthlessness/guilt, difficulty concentrating and hopelessness. She denies hypersomnia, impaired memory and recurrent thoughts of death. She experiences the following side effects from the treatment: none. Anxiety Review:  Patient is seen for sleep disturbance. Current treatment includes doxepin and no other therapies. Ongoing symptoms include: insomnia. Patient denies: palpitations, sweating, chest pain, shortness of breath, dizziness, paresthesias, racing thoughts, psychomotor agitation, feelings of losing control, difficulty concentrating, suicidal ideation. Reported side effects from the treatment: none.     ROS   Constitutional: Negative for fever. HENT: Negative for sore throat.    Eyes: Negative for visual disturbance. Respiratory: Negative for shortness of breath.    Cardiovascular: Negative for chest pain. Gastrointestinal: Positive for abdominal pain. Endocrine: Negative for polyuria. Genitourinary: Positive for frequency. Negative for difficulty urinating and hematuria. Musculoskeletal: Positive for back pain. Negative for gait problem.    Skin: wound site healing  Allergic/Immunologic: Negative for immunocompromised state. Neurological: Negative for syncope. Psychiatric/Behavioral: Positive for sleep disturbance.   All other systems reviewed and are negative. Objective:  Vitals:    12/09/19 1550 12/09/19 1552   BP: (!) 139/95 130/89   Pulse: 92    Resp: 18    Temp: 97 °F (36.1 °C)    TempSrc: Oral    SpO2: 97%    Weight: 152 lb 12.8 oz (69.3 kg)    Height: 5' 4\" (1.626 m)    PainSc:   0 - No pain      alert, well appearing, and in no distress, oriented to person, place, and time and normal appearing weight  Mental status - anxious  Chest - clear to auscultation, no wheezes, rales or rhonchi, symmetric air entry  Heart - normal rate, regular rhythm, normal S1, S2, no murmurs, rubs, clicks or gallops  Abdomen - soft, nondistended, no masses or organomegaly  tenderness noted diffuse  Extremities - peripheral pulses normal, no pedal edema, no clubbing or cyanosis  Skin - incision sites clean, dry and intact    TESTS  Chest xray  IMPRESSION:     Underexpanded lungs without superimposed acute radiographic abnormality. Assessment/Plan:    1. Moderate persistent asthma without complication  Continue inhaled therapy  - budesonide-formoterol (SYMBICORT) 160-4.5 mcg/actuation HFAA; Take 2 Puffs by inhalation two (2) times a day. Dispense: 1 Inhaler; Refill: 2    2. Essential hypertension, benign  Goal <130/80    3. Primary osteoarthritis of left knee  Pain management    4. S/p small bowel obstruction  Pain management  - acetaminophen-codeine (TYLENOL #3) 300-30 mg per tablet; Take 1 Tab by mouth every four (4) hours as needed for Pain for up to 3 days. Max Daily Amount: 6 Tabs. Dispense: 12 Tab; Refill: 0    5. Insomnia secondary to chronic pain  Continue sleep aid      Lab review: orders written for new lab studies as appropriate; see orders      I have discussed the diagnosis with the patient and the intended plan as seen in the above orders.   The patient has received an after-visit summary and questions were answered concerning future plans. I have discussed medication side effects and warnings with the patient as well. I have reviewed the plan of care with the patient, accepted their input and they are in agreement with the treatment goals.

## 2019-12-09 NOTE — LETTER
NOTIFICATION RETURN TO WORK / SCHOOL 
 
12/9/2019 4:20 PM 
 
Ms. Morales Perez [address] [facility] [address] [phone] To Whom It May Concern: 
 
Morales Perez is currently under the care of [facility]. She will return to work/school on: 12/12/2019 If there are questions or concerns please have the patient contact our office. Sincerely, Shira Gardner MD

## 2019-12-09 NOTE — LETTER
NOTIFICATION RETURN TO WORK  
12/9/2019 4:00 PM 
 
Ms. Hollie Mcghee [address] [facility] [address] [phone] To Whom It May Concern: 
 
Hollie Mcghee is currently under the care of [facility]. She will return to work on: 12/11/2019 If there are questions or concerns please have the patient contact our office. Sincerely, Will Ley MD

## 2019-12-11 DIAGNOSIS — E11.65 TYPE 2 DIABETES MELLITUS WITH HYPERGLYCEMIA, WITHOUT LONG-TERM CURRENT USE OF INSULIN (HCC): Primary | ICD-10-CM

## 2019-12-11 DIAGNOSIS — I10 ESSENTIAL HYPERTENSION: ICD-10-CM

## 2019-12-12 LAB
AVG GLU, 10930: 187 MG/DL (ref 91–123)
CHOLEST SERPL-MCNC: 240 MG/DL (ref 110–200)
CREATININE, URINE: 46 MG/DL
HBA1C MFR BLD HPLC: 8.1 % (ref 4.8–5.6)
HDLC SERPL-MCNC: 4.6 MG/DL (ref 0–5)
HDLC SERPL-MCNC: 52 MG/DL
LDL/HDL RATIO,LDHD: 2.6
LDLC SERPL CALC-MCNC: 136 MG/DL (ref 50–99)
MICROALB/CREAT RATIO, 140286: NORMAL
MICROALBUMIN,URINE RANDOM 140054: <12 MG/L (ref 0.1–17)
NON-HDL CHOLESTEROL, 011976: 188 MG/DL
TRIGL SERPL-MCNC: 263 MG/DL (ref 40–149)
VLDLC SERPL CALC-MCNC: 53 MG/DL (ref 8–30)

## 2019-12-17 ENCOUNTER — HOSPITAL ENCOUNTER (OUTPATIENT)
Dept: MAMMOGRAPHY | Age: 59
Discharge: HOME | End: 2019-12-17
Attending: FAMILY MEDICINE
Payer: COMMERCIAL

## 2019-12-17 DIAGNOSIS — Z12.31 VISIT FOR SCREENING MAMMOGRAM: ICD-10-CM

## 2019-12-17 PROCEDURE — 77063 BREAST TOMOSYNTHESIS BI: CPT

## 2019-12-31 ENCOUNTER — APPOINTMENT (OUTPATIENT)
Dept: CT IMAGING | Age: 59
End: 2019-12-31
Attending: EMERGENCY MEDICINE
Payer: COMMERCIAL

## 2019-12-31 ENCOUNTER — HOSPITAL ENCOUNTER (EMERGENCY)
Age: 59
Discharge: HOME | End: 2019-12-31
Attending: EMERGENCY MEDICINE
Payer: COMMERCIAL

## 2019-12-31 ENCOUNTER — APPOINTMENT (OUTPATIENT)
Dept: GENERAL RADIOLOGY | Age: 59
End: 2019-12-31
Attending: PHYSICIAN ASSISTANT
Payer: COMMERCIAL

## 2019-12-31 VITALS
HEIGHT: 64 IN | BODY MASS INDEX: 25.61 KG/M2 | OXYGEN SATURATION: 99 % | HEART RATE: 85 BPM | WEIGHT: 150 LBS | RESPIRATION RATE: 22 BRPM | SYSTOLIC BLOOD PRESSURE: 136 MMHG | TEMPERATURE: 98.7 F | DIASTOLIC BLOOD PRESSURE: 88 MMHG

## 2019-12-31 DIAGNOSIS — R10.84 ABDOMINAL PAIN, GENERALIZED: Primary | ICD-10-CM

## 2019-12-31 DIAGNOSIS — R06.02 SOB (SHORTNESS OF BREATH): ICD-10-CM

## 2019-12-31 LAB
ALBUMIN SERPL-MCNC: 3.5 G/DL (ref 3.4–5)
ALBUMIN/GLOB SERPL: 0.8 {RATIO} (ref 0.8–1.7)
ALP SERPL-CCNC: 162 U/L (ref 45–117)
ALT SERPL-CCNC: 22 U/L (ref 13–56)
ANION GAP SERPL CALC-SCNC: 7 MMOL/L (ref 3–18)
APPEARANCE UR: CLEAR
AST SERPL-CCNC: 16 U/L (ref 10–38)
BASOPHILS # BLD: 0 K/UL (ref 0–0.1)
BASOPHILS NFR BLD: 0 % (ref 0–2)
BILIRUB SERPL-MCNC: 0.4 MG/DL (ref 0.2–1)
BILIRUB UR QL: NEGATIVE
BUN SERPL-MCNC: 14 MG/DL (ref 7–18)
BUN/CREAT SERPL: 14 (ref 12–20)
CALCIUM SERPL-MCNC: 9.7 MG/DL (ref 8.5–10.1)
CHLORIDE SERPL-SCNC: 104 MMOL/L (ref 100–111)
CK MB CFR SERPL CALC: NORMAL % (ref 0–4)
CK MB SERPL-MCNC: <1 NG/ML (ref 5–25)
CK SERPL-CCNC: 57 U/L (ref 26–192)
CO2 SERPL-SCNC: 30 MMOL/L (ref 21–32)
COLOR UR: YELLOW
CREAT SERPL-MCNC: 0.99 MG/DL (ref 0.6–1.3)
DIFFERENTIAL METHOD BLD: ABNORMAL
EOSINOPHIL # BLD: 0.1 K/UL (ref 0–0.4)
EOSINOPHIL NFR BLD: 1 % (ref 0–5)
ERYTHROCYTE [DISTWIDTH] IN BLOOD BY AUTOMATED COUNT: 15.1 % (ref 11.6–14.5)
GLOBULIN SER CALC-MCNC: 4.3 G/DL (ref 2–4)
GLUCOSE SERPL-MCNC: 154 MG/DL (ref 74–99)
GLUCOSE UR STRIP.AUTO-MCNC: >1000 MG/DL
HCT VFR BLD AUTO: 43.6 % (ref 35–45)
HGB BLD-MCNC: 13.9 G/DL (ref 12–16)
HGB UR QL STRIP: NEGATIVE
KETONES UR QL STRIP.AUTO: NEGATIVE MG/DL
LEUKOCYTE ESTERASE UR QL STRIP.AUTO: NEGATIVE
LIPASE SERPL-CCNC: 146 U/L (ref 73–393)
LYMPHOCYTES # BLD: 1.8 K/UL (ref 0.9–3.6)
LYMPHOCYTES NFR BLD: 26 % (ref 21–52)
MCH RBC QN AUTO: 27.3 PG (ref 24–34)
MCHC RBC AUTO-ENTMCNC: 31.9 G/DL (ref 31–37)
MCV RBC AUTO: 85.7 FL (ref 74–97)
MONOCYTES # BLD: 0.5 K/UL (ref 0.05–1.2)
MONOCYTES NFR BLD: 7 % (ref 3–10)
NEUTS SEG # BLD: 4.6 K/UL (ref 1.8–8)
NEUTS SEG NFR BLD: 66 % (ref 40–73)
NITRITE UR QL STRIP.AUTO: NEGATIVE
PH UR STRIP: 6.5 [PH] (ref 5–8)
PLATELET # BLD AUTO: 299 K/UL (ref 135–420)
PMV BLD AUTO: 10.5 FL (ref 9.2–11.8)
POTASSIUM SERPL-SCNC: 3.4 MMOL/L (ref 3.5–5.5)
PROT SERPL-MCNC: 7.8 G/DL (ref 6.4–8.2)
PROT UR STRIP-MCNC: NEGATIVE MG/DL
RBC # BLD AUTO: 5.09 M/UL (ref 4.2–5.3)
SODIUM SERPL-SCNC: 141 MMOL/L (ref 136–145)
SP GR UR REFRACTOMETRY: 1.01 (ref 1–1.03)
TROPONIN I SERPL-MCNC: <0.02 NG/ML (ref 0–0.04)
UROBILINOGEN UR QL STRIP.AUTO: 0.2 EU/DL (ref 0.2–1)
WBC # BLD AUTO: 7 K/UL (ref 4.6–13.2)

## 2019-12-31 PROCEDURE — 74177 CT ABD & PELVIS W/CONTRAST: CPT

## 2019-12-31 PROCEDURE — 74011250636 HC RX REV CODE- 250/636: Performed by: EMERGENCY MEDICINE

## 2019-12-31 PROCEDURE — 85025 COMPLETE CBC W/AUTO DIFF WBC: CPT

## 2019-12-31 PROCEDURE — 96374 THER/PROPH/DIAG INJ IV PUSH: CPT

## 2019-12-31 PROCEDURE — 71046 X-RAY EXAM CHEST 2 VIEWS: CPT

## 2019-12-31 PROCEDURE — 74011636320 HC RX REV CODE- 636/320: Performed by: EMERGENCY MEDICINE

## 2019-12-31 PROCEDURE — 99284 EMERGENCY DEPT VISIT MOD MDM: CPT

## 2019-12-31 PROCEDURE — 83690 ASSAY OF LIPASE: CPT

## 2019-12-31 PROCEDURE — 77030029684 HC NEB SM VOL KT MONA -A

## 2019-12-31 PROCEDURE — 93005 ELECTROCARDIOGRAM TRACING: CPT

## 2019-12-31 PROCEDURE — 82550 ASSAY OF CK (CPK): CPT

## 2019-12-31 PROCEDURE — 80053 COMPREHEN METABOLIC PANEL: CPT

## 2019-12-31 PROCEDURE — 94640 AIRWAY INHALATION TREATMENT: CPT

## 2019-12-31 PROCEDURE — 74011000250 HC RX REV CODE- 250: Performed by: EMERGENCY MEDICINE

## 2019-12-31 PROCEDURE — 81003 URINALYSIS AUTO W/O SCOPE: CPT

## 2019-12-31 RX ORDER — IPRATROPIUM BROMIDE AND ALBUTEROL SULFATE 2.5; .5 MG/3ML; MG/3ML
3 SOLUTION RESPIRATORY (INHALATION)
Status: COMPLETED | OUTPATIENT
Start: 2019-12-31 | End: 2019-12-31

## 2019-12-31 RX ORDER — MORPHINE SULFATE 2 MG/ML
2 INJECTION, SOLUTION INTRAMUSCULAR; INTRAVENOUS ONCE
Status: COMPLETED | OUTPATIENT
Start: 2019-12-31 | End: 2019-12-31

## 2019-12-31 RX ORDER — ALBUTEROL SULFATE 2.5 MG/.5ML
2.5 SOLUTION RESPIRATORY (INHALATION)
Status: DISCONTINUED | OUTPATIENT
Start: 2019-12-31 | End: 2019-12-31

## 2019-12-31 RX ADMIN — MORPHINE SULFATE 2 MG: 2 INJECTION, SOLUTION INTRAMUSCULAR; INTRAVENOUS at 15:48

## 2019-12-31 RX ADMIN — IPRATROPIUM BROMIDE AND ALBUTEROL SULFATE 3 ML: .5; 3 SOLUTION RESPIRATORY (INHALATION) at 15:48

## 2019-12-31 RX ADMIN — IOPAMIDOL 96 ML: 612 INJECTION, SOLUTION INTRAVENOUS at 16:07

## 2019-12-31 NOTE — DISCHARGE INSTRUCTIONS
Shortness of Breath: Care Instructions  Your Care Instructions  Shortness of breath has many causes. Sometimes conditions such as anxiety can lead to shortness of breath. Some people get mild shortness of breath when they exercise. Trouble breathing also can be a symptom of a serious problem, such as asthma, lung disease, emphysema, heart problems, and pneumonia. If your shortness of breath continues, you may need tests and treatment. Watch for any changes in your breathing and other symptoms. Follow-up care is a key part of your treatment and safety. Be sure to make and go to all appointments, and call your doctor if you are having problems. It's also a good idea to know your test results and keep a list of the medicines you take. How can you care for yourself at home? · Do not smoke or allow others to smoke around you. If you need help quitting, talk to your doctor about stop-smoking programs and medicines. These can increase your chances of quitting for good. · Get plenty of rest and sleep. · Take your medicines exactly as prescribed. Call your doctor if you think you are having a problem with your medicine. · Find healthy ways to deal with stress. ? Exercise daily. ? Get plenty of sleep. ? Eat regularly and well. When should you call for help? Call 911 anytime you think you may need emergency care. For example, call if:    · You have severe shortness of breath.     · You have symptoms of a heart attack. These may include:  ? Chest pain or pressure, or a strange feeling in the chest.  ? Sweating. ? Shortness of breath. ? Nausea or vomiting. ? Pain, pressure, or a strange feeling in the back, neck, jaw, or upper belly or in one or both shoulders or arms. ? Lightheadedness or sudden weakness. ? A fast or irregular heartbeat. After you call 911, the  may tell you to chew 1 adult-strength or 2 to 4 low-dose aspirin. Wait for an ambulance.  Do not try to drive yourself.    Call your doctor now or seek immediate medical care if:    · Your shortness of breath gets worse or you start to wheeze. Wheezing is a high-pitched sound when you breathe.     · You wake up at night out of breath or have to prop your head up on several pillows to breathe.     · You are short of breath after only light activity or while at rest.    Watch closely for changes in your health, and be sure to contact your doctor if:    · You do not get better over the next 1 to 2 days. Where can you learn more? Go to http://betzy-didi.info/. Enter S780 in the search box to learn more about \"Shortness of Breath: Care Instructions. \"  Current as of: June 9, 2019  Content Version: 12.2  © 1371-3718 [employer]. Care instructions adapted under license by [employer] (which disclaims liability or warranty for this information). If you have questions about a medical condition or this instruction, always ask your healthcare professional. [place] [address] any warranty or liability for your use of this information. Patient Education        Abdominal Pain: Care Instructions  Your Care Instructions    Abdominal pain has many possible causes. Some aren't serious and get better on their own in a few days. Others need more testing and treatment. If your pain continues or gets worse, you need to be rechecked and may need more tests to find out what is wrong. You may need surgery to correct the problem. Don't ignore new symptoms, such as fever, nausea and vomiting, urination problems, pain that gets worse, and dizziness. These may be signs of a more serious problem. Your doctor may have recommended a follow-up visit in the next 8 to 12 hours. If you are not getting better, you may need more tests or treatment. The doctor has checked you carefully, but problems can develop later. If you notice any problems or new symptoms, get medical treatment right away.   Follow-up care is a key part of your treatment and safety. Be sure to make and go to all appointments, and call your doctor if you are having problems. It's also a good idea to know your test results and keep a list of the medicines you take. How can you care for yourself at home? · Rest until you feel better. · To prevent dehydration, drink plenty of fluids, enough so that your urine is light yellow or clear like water. Choose water and other caffeine-free clear liquids until you feel better. If you have kidney, heart, or liver disease and have to limit fluids, talk with your doctor before you increase the amount of fluids you drink. · If your stomach is upset, eat mild foods, such as rice, dry toast or crackers, bananas, and applesauce. Try eating several small meals instead of two or three large ones. · Wait until 48 hours after all symptoms have gone away before you have spicy foods, alcohol, and drinks that contain caffeine. · Do not eat foods that are high in fat. · Avoid anti-inflammatory medicines such as aspirin, ibuprofen (Advil, Motrin), and naproxen (Aleve). These can cause stomach upset. Talk to your doctor if you take daily aspirin for another health problem. When should you call for help? Call 911 anytime you think you may need emergency care. For example, call if:    · You passed out (lost consciousness).     · You pass maroon or very bloody stools.     · You vomit blood or what looks like coffee grounds.     · You have new, severe belly pain.    Call your doctor now or seek immediate medical care if:    · Your pain gets worse, especially if it becomes focused in one area of your belly.     · You have a new or higher fever.     · Your stools are black and look like tar, or they have streaks of blood.     · You have unexpected vaginal bleeding.     · You have symptoms of a urinary tract infection. These may include:  ? Pain when you urinate. ? Urinating more often than usual.  ?  Blood in your urine.     · You are dizzy or lightheaded, or you feel like you may faint.    Watch closely for changes in your health, and be sure to contact your doctor if:    · You are not getting better after 1 day (24 hours). Where can you learn more? Go to http://betzy-didi.info/. Enter Z177 in the search box to learn more about \"Abdominal Pain: Care Instructions. \"  Current as of: June 26, 2019  Content Version: 12.2  © 7737-1722 [employer]. Care instructions adapted under license by [employer] (which disclaims liability or warranty for this information). If you have questions about a medical condition or this instruction, always ask your healthcare professional. [address] any warranty or liability for your use of this information.

## 2019-12-31 NOTE — ED TRIAGE NOTES
Pt arrives with  3 days of abdominal pain and SOB. Pt states sob with exertion. Pt denies n/v or diarrhea, pt states urinary frequency but no burning or pain.

## 2019-12-31 NOTE — ED PROVIDER NOTES
EMERGENCY DEPARTMENT HISTORY AND PHYSICAL EXAM    3:42 PM      Date: 12/31/2019  Patient Name: Christopher Ashley    History of Presenting Illness     Chief Complaint   Patient presents with    Abdominal Pain    Shortness of Breath         History Provided By: patient    Additional History (Context): Christopher Ashley is a 61 y.o. female presents with history of asthma and prior bowel perforation and obstruction sounds like adhesions, comes in with 1 day of exacerbated asthma symptoms sometimes the albuterol helps sometimes it does not its shortness of breath with exertion. She also has diffuse burning abdominal pain sensation, waxing and waning severe at its worst, no vomiting no change in bowel habits and temperature. No urinary symptoms. PCP: Hema Huynh MD    Chief Complaint:   Duration:    Timing:    Location:   Quality:   Severity:   Modifying Factors:   Associated Symptoms:       Current Outpatient Medications   Medication Sig Dispense Refill    budesonide-formoterol (SYMBICORT) 160-4.5 mcg/actuation HFAA Take 2 Puffs by inhalation two (2) times a day. 1 Inhaler 2    amLODIPine (NORVASC) 5 mg tablet Take 1 Tab by mouth daily. 30 Tab 0    ondansetron hcl (ZOFRAN) 4 mg tablet Take 1 Tab by mouth every eight (8) hours as needed for Nausea. 12 Tab 0    albuterol (PROVENTIL HFA, VENTOLIN HFA, PROAIR HFA) 90 mcg/actuation inhaler Take 2 Puffs by inhalation every four (4) hours as needed for Wheezing. 1 Inhaler 1    dextromethorphan-guaiFENesin (MUCINEX DM) 60-1,200 mg Tb12 Take 1 Tab by mouth every twelve (12) hours as needed for Cough. 20 Tab 0    cyclobenzaprine (FLEXERIL) 10 mg tablet Take 1 Tab by mouth three (3) times daily as needed for Muscle Spasm(s). 20 Tab 0    doxepin (SINEQUAN) 25 mg capsule Take 1 Cap by mouth nightly. 30 Cap 2    polyethylene glycol (MIRALAX) 17 gram/dose powder Take 17 g by mouth daily.  1 tablespoon with 8 oz of water daily 595 g 0    metFORMIN (Marda Meigs ER) 500 mg TG24 24 hour tablet Take 1,000 mg by mouth two (2) times a day. take 1 tablet by mouth twice a day 120 Tab 11    multivitamin (ONE A DAY) tablet Take 1 Tab by mouth daily.  ALLERGY RELIEF, CETIRIZINE, 10 mg tablet take 1 tablet by mouth once daily 90 Tab 3    bisoprolol-hydroCHLOROthiazide (ZIAC) 2.5-6.25 mg per tablet Take 1 Tab by mouth daily. 90 Tab 3    omeprazole (PRILOSEC) 10 mg capsule take 1 capsule by mouth once daily 30 Cap 11    FARXIGA 10 mg tab tablet take 1 tablet by mouth once daily 90 Tab 3    folic acid (FOLVITE) 1 mg tablet Take  by mouth daily.  methotrexate (RHEUMATREX) 2.5 mg tablet Take 7.5 mg by mouth every Tuesday.  sulfaSALAzine (AZULFIDINE) 500 mg tablet Take 500 mg by mouth two (2) times a day.       glucose blood VI test strips (ONETOUCH ULTRA TEST) strip Check blood glucose as directed 100 Strip 1       Past History     Past Medical History:  Past Medical History:   Diagnosis Date    Abdominal adhesions     Adnexal cyst 7/30/2019    Left    Anemia     Asthma     Chronic pelvic pain in female 7/30/2019    Diabetes mellitus     Dyskinesia     bilateral    Essential hypertension     GERD (gastroesophageal reflux disease)     Hypertension     Menopause     Microhematuria     Rheumatoid arteritis ([facility] [address] 2018    Stool color black        Past Surgical History:  Past Surgical History:   Procedure Laterality Date    COLONOSCOPY N/A 1/21/2019    COLONOSCOPY performed by Filomena Garvin [facility] at [facility] ENDOSCOPY    HX CHOLECYSTECTOMY  6/28/10    HX COLONOSCOPY      HX ENDOSCOPY      HX HERNIA REPAIR      HX HYSTERECTOMY  1989    Fibroids    HX KNEE REPLACEMENT Left     HX KNEE REPLACEMENT Right 06/2018    HX OOPHORECTOMY  12/2000    HX ORTHOPAEDIC Right     ankle- multiple surgeries    HX SMALL BOWEL RESECTION  12/2000    HX SMALL BOWEL RESECTION  02/21/2017    Dr. Juan Escalona         Family History:  Family History Problem Relation Age of Onset    Hypertension Other         parent    Breast Cancer Other 21    Heart Disease Father     Diabetes Father     Lung Disease Father     Diabetes Mother     Hypertension Other         sibling    Diabetes Other         parent    Kidney Disease Brother     Diabetes Brother     Lung Disease Brother        Social History:  Social History     Tobacco Use    Smoking status: Never Smoker    Smokeless tobacco: Never Used   Substance Use Topics    Alcohol use: No    Drug use: No       Allergies: Allergies   Allergen Reactions    Macrodantin [Nitrofurantoin Macrocrystalline] Itching and Other (comments)    Tape [Adhesive] Other (comments)     Paper tape-- feels like burning skin  Burned skin when had wound vac         Review of Systems     Review of Systems   Constitutional: Negative for diaphoresis and fever. HENT: Negative for congestion and sore throat. Eyes: Negative for pain and itching. Respiratory: Positive for shortness of breath. Negative for cough. Cardiovascular: Negative for chest pain and palpitations. Gastrointestinal: Positive for abdominal pain. Negative for diarrhea. Endocrine: Negative for polydipsia and polyuria. Genitourinary: Negative for dysuria and hematuria. Musculoskeletal: Negative for arthralgias and myalgias. Skin: Negative for rash and wound. Neurological: Negative for seizures and syncope. Hematological: Does not bruise/bleed easily. Psychiatric/Behavioral: Negative for agitation and hallucinations. Physical Exam       Patient Vitals for the past 12 hrs:   Temp Pulse Resp BP SpO2   12/31/19 1557     99 %   12/31/19 1539    136/88 98 %   12/31/19 1350 98.7 °F (37.1 °C) 85 22 (!) 156/107 98 %       Physical Exam  Vitals signs and nursing note reviewed. Constitutional:       General: She is not in acute distress. Appearance: She is well-developed. HENT:      Head: Normocephalic and atraumatic.    Eyes: General: No scleral icterus. Conjunctiva/sclera: Conjunctivae normal.   Neck:      Musculoskeletal: Normal range of motion and neck supple. Vascular: No JVD. Cardiovascular:      Rate and Rhythm: Normal rate and regular rhythm. Heart sounds: Normal heart sounds. Comments: 4 intact extremity pulses  Pulmonary:      Effort: Pulmonary effort is normal.      Breath sounds: Normal breath sounds. No wheezing or rhonchi. Abdominal:      Palpations: Abdomen is soft. There is no mass. Tenderness: There is no tenderness. Comments: Several well-healed surgical scars, nonspecific generalized tenderness   Musculoskeletal: Normal range of motion. Lymphadenopathy:      Cervical: No cervical adenopathy. Skin:     General: Skin is warm and dry. Neurological:      Mental Status: She is alert. Diagnostic Study Results   Labs -  Recent Results (from the past 12 hour(s))   EKG, 12 LEAD, INITIAL    Collection Time: 12/31/19  2:14 PM   Result Value Ref Range    Ventricular Rate 89 BPM    Atrial Rate 89 BPM    P-R Interval 132 ms    QRS Duration 72 ms    Q-T Interval 376 ms    QTC Calculation (Bezet) 457 ms    Calculated P Axis 57 degrees    Calculated R Axis 42 degrees    Calculated T Axis 91 degrees    Diagnosis       Sinus rhythm with premature supraventricular complexes  T wave abnormality, consider lateral ischemia  Abnormal ECG  When compared with ECG of 19-SEP-2019 20:16,  premature supraventricular complexes are now present  Nonspecific T wave abnormality, worse in Inferior leads  Nonspecific T wave abnormality now evident in Lateral leads     CBC WITH AUTOMATED DIFF    Collection Time: 12/31/19  2:30 PM   Result Value Ref Range    WBC 7.0 4.6 - 13.2 K/uL    RBC 5.09 4. 20 - 5.30 M/uL    HGB 13.9 12.0 - 16.0 g/dL    HCT 43.6 35.0 - 45.0 %    MCV 85.7 74.0 - 97.0 FL    MCH 27.3 24.0 - 34.0 PG    MCHC 31.9 31.0 - 37.0 g/dL    RDW 15.1 (H) 11.6 - 14.5 %    PLATELET 283 393 - 925 K/uL MPV 10.5 9.2 - 11.8 FL    NEUTROPHILS 66 40 - 73 %    LYMPHOCYTES 26 21 - 52 %    MONOCYTES 7 3 - 10 %    EOSINOPHILS 1 0 - 5 %    BASOPHILS 0 0 - 2 %    ABS. NEUTROPHILS 4.6 1.8 - 8.0 K/UL    ABS. LYMPHOCYTES 1.8 0.9 - 3.6 K/UL    ABS. MONOCYTES 0.5 0.05 - 1.2 K/UL    ABS. EOSINOPHILS 0.1 0.0 - 0.4 K/UL    ABS. BASOPHILS 0.0 0.0 - 0.1 K/UL    DF AUTOMATED     METABOLIC PANEL, COMPREHENSIVE    Collection Time: 12/31/19  2:30 PM   Result Value Ref Range    Sodium 141 136 - 145 mmol/L    Potassium 3.4 (L) 3.5 - 5.5 mmol/L    Chloride 104 100 - 111 mmol/L    CO2 30 21 - 32 mmol/L    Anion gap 7 3.0 - 18 mmol/L    Glucose 154 (H) 74 - 99 mg/dL    BUN 14 7.0 - 18 MG/DL    Creatinine 0.99 0.6 - 1.3 MG/DL    BUN/Creatinine ratio 14 12 - 20      GFR est AA >60 >60 ml/min/1.73m2    GFR est non-AA 57 (L) >60 ml/min/1.73m2    Calcium 9.7 8.5 - 10.1 MG/DL    Bilirubin, total 0.4 0.2 - 1.0 MG/DL    ALT (SGPT) 22 13 - 56 U/L    AST (SGOT) 16 10 - 38 U/L    Alk.  phosphatase 162 (H) 45 - 117 U/L    Protein, total 7.8 6.4 - 8.2 g/dL    Albumin 3.5 3.4 - 5.0 g/dL    Globulin 4.3 (H) 2.0 - 4.0 g/dL    A-G Ratio 0.8 0.8 - 1.7     CARDIAC PANEL,(CK, CKMB & TROPONIN)    Collection Time: 12/31/19  2:30 PM   Result Value Ref Range    CK 57 26 - 192 U/L    CK - MB <1.0 <3.6 ng/ml    CK-MB Index  0.0 - 4.0 %     CALCULATION NOT PERFORMED WHEN RESULT IS BELOW LINEAR LIMIT    Troponin-I, QT <0.02 0.0 - 0.045 NG/ML   LIPASE    Collection Time: 12/31/19  2:30 PM   Result Value Ref Range    Lipase 146 73 - 393 U/L   URINALYSIS W/ RFLX MICROSCOPIC    Collection Time: 12/31/19  2:30 PM   Result Value Ref Range    Color YELLOW      Appearance CLEAR      Specific gravity 1.013 1.005 - 1.030      pH (UA) 6.5 5.0 - 8.0      Protein NEGATIVE  NEG mg/dL    Glucose >1,000 (A) NEG mg/dL    Ketone NEGATIVE  NEG mg/dL    Bilirubin NEGATIVE  NEG      Blood NEGATIVE  NEG      Urobilinogen 0.2 0.2 - 1.0 EU/dL    Nitrites NEGATIVE  NEG      Leukocyte Esterase NEGATIVE  NEG         Radiologic Studies -   CT ABD PELV W CONT   Final Result   IMPRESSION:      1. No evidence of new acute abdominal or pelvic CT finding. 2. Interval removal postop drainage catheter within the pelvis with very small   residual postop soft tissue thickening but no abnormal residual fluid   collection. 3. Resolution of previous mild bowel dilatation with no evidence of bowel   obstruction. 4. Stable nonspecific mesenteric stranding, possible postop fibrosis. 5. Stable 3.9 cm left adnexal cyst.      6. Hepatic steatosis. XR CHEST PA LAT   Final Result   IMPRESSION:      No acute cardiopulmonary disease. Xr Chest Pa Lat    Result Date: 12/31/2019  EXAM: CHEST, TWO VIEWS CLINICAL INDICATION/HISTORY: SOB   > Additional: Abdominal pain COMPARISON: Single view chest 11/20/2019   > Reference Exam: None. TECHNIQUE: PA and lateral views of the chest were obtained. _______________ FINDINGS: SUPPORT DEVICES: None. HEART AND MEDIASTINUM: Cardiac mediastinal silhouette appears within normal limits. Normal caliber thoracic aorta. No central vascular congestion. LUNGS AND PLEURAL SPACES: Lungs are well aerated with no confluent airspace opacity. No pleural effusion or pneumothorax. No pleural effusion or pneumothorax. BONY THORAX AND SOFT TISSUES: Healed left-sided rib fractures are stable. _______________     IMPRESSION: No acute cardiopulmonary disease. Ct Abd Pelv W Cont    Result Date: 12/31/2019  EXAM:CT abdomen pelvis with contrast CLINICAL INDICATION/HISTORY: Abdominal pain with urinary frequency, prior cholecystectomy, hysterectomy, hernia repair, small bowel resection COMPARISON: 10/4/2019. TECHNIQUE: Standard helical CT performed through the abdomen and pelvis with IV contrast.  Coronal and sagittal reformations were generated.  One or more dose reduction techniques were used on this CT: automated exposure control, adjustment of the mAs and/or kVp according to patient's size, and iterative reconstruction techniques. The specific techniques utilized on this CT exam have been documented in the patient's electronic medical record. Digital imaging and communications and medicine (DICOM) format image data are available to nonaffiliated external healthcare facilities or entities on a secure, media free, reciprocally searchable basis with patient authorization for at least a 12 month period after this study. _______________ FINDINGS: INFERIOR THORAX: Mild bibasilar atelectasis likely dependent type. Stable mildly enlarged cardiac silhouette. LIVER/BILIARY: Mild diffuse hepatic steatosis with no new intrinsic hepatic lesion. No biliary dilation. Prior cholecystectomy. SPLEEN: Normal. PANCREAS: Normal. ADRENALS: Normal. KIDNEYS: Several small parapelvic cysts bilaterally with no new renal parenchymal lesion obstruction or calculus. LYMPH NODES: No technically enlarged nodes. GI TRACT: Previous small bowel anastomosis within the pelvis with no abnormal small or large bowel dilatation. Interval removal drainage catheter mid pelvis with very small band of soft tissue density likely residual soft tissue inflammation but no abnormal fluid collection at this site. Persistent mesenteric stranding anteriorly with no new bowel wall thickening. VASCULATURE: Unremarkable. PELVIC ORGANS: Prior hysterectomy with stable 3.9 cm left adnexal cyst. OTHER: No aggressive appearing osseous lesion. _______________     IMPRESSION: 1. No evidence of new acute abdominal or pelvic CT finding. 2. Interval removal postop drainage catheter within the pelvis with very small residual postop soft tissue thickening but no abnormal residual fluid collection. 3. Resolution of previous mild bowel dilatation with no evidence of bowel obstruction. 4. Stable nonspecific mesenteric stranding, possible postop fibrosis. 5. Stable 3.9 cm left adnexal cyst. 6. Hepatic steatosis.        Medications ordered:   Medications morphine injection 2 mg (2 mg IntraVENous Given 12/31/19 1544)   albuterol-ipratropium (DUO-NEB) 2.5 MG-0.5 MG/3 ML (3 mL Nebulization Given 12/31/19 1548)   iopamidol (ISOVUE 300) 61 % contrast injection 100 mL (96 mL IntraVENous Given 12/31/19 1607)         Medical Decision Making   Initial Medical Decision Making and DDx:  Carotid day in the ER with many admission holds, patient's labs and x-ray were performed in the waiting room and results were back at the time that I saw her. Clinical picture not consistent with ACS CHF pneumonia pneumothorax. We will give her a nebulizer treatment as she might have some bronchospasm from recent weather changes. With respect to her abdomen she tells me that during a total hysterectomy they nicked some bowel and it sounds like she got peritonitis, resulting in adhesions causing bowel obstructions and so she required an ex lap and bowel resection. She is high risk for another complication so will get CT scan and give some pain medication. ED Course: Progress Notes, Reevaluation, and Consults:     4:40 PM Pt reevaluated at this time. Discussed results and findings, as well as, diagnosis and plan for discharge. Follow up with doctors/services listed. Return to the emergency department for alarming symptoms. Pt verbalizes understanding and agreement with plan. All questions addressed. No alarming findings on CT scan. She now tells me that the pain is most severe when she goes from lying to sitting up to standing. Sounds very muscular, probably abdominal wall. Do not think she will benefit from narcotics or Bentyl. Her breathing and cough is much better now after the treatment. She has plenty of Symbicort and albuterol. I am the first provider for this patient. I reviewed the vital signs, available nursing notes, past medical history, past surgical history, family history and social history.     Patient Vitals for the past 12 hrs:   Temp Pulse Resp BP SpO2 12/31/19 1557     99 %   12/31/19 1539    136/88 98 %   12/31/19 1350 98.7 °F (37.1 °C) 85 22 (!) 156/107 98 %       Vital Signs-Reviewed the patient's vital signs. Pulse Oximetry Analysis, Cardiac Monitor, 12 lead ekg:  Pulse ox 98% on room air. Twelve-lead EKG at 2:17 PM sinus rhythm at 89 nonspecific T wave abnormalities  Interpreted by the EP. Records Reviewed: Nursing notes reviewed (Time of Review: 3:42 PM)    Procedures:   Critical Care Time:   Aspirin: (was aspirin given for stroke?)    Diagnosis     Clinical Impression:   1. Abdominal pain, generalized    2. SOB (shortness of breath)        Disposition: Discharged      Follow-up Information     Follow up With Specialties Details Why Contact Info    Nabil Wang MD Family Practice, Internal Medicine In 3 days  [id number] [facility]  [address] [place] [address] 951 [phone]             Patient's Medications   Start Taking    No medications on file   Continue Taking    ALBUTEROL (PROVENTIL HFA, VENTOLIN HFA, PROAIR HFA) 90 MCG/ACTUATION INHALER    Take 2 Puffs by inhalation every four (4) hours as needed for Wheezing. ALLERGY RELIEF, CETIRIZINE, 10 MG TABLET    take 1 tablet by mouth once daily    AMLODIPINE (NORVASC) 5 MG TABLET    Take 1 Tab by mouth daily. BISOPROLOL-HYDROCHLOROTHIAZIDE (ZIAC) 2.5-6.25 MG PER TABLET    Take 1 Tab by mouth daily. BUDESONIDE-FORMOTEROL (SYMBICORT) 160-4.5 MCG/ACTUATION HFAA    Take 2 Puffs by inhalation two (2) times a day. CYCLOBENZAPRINE (FLEXERIL) 10 MG TABLET    Take 1 Tab by mouth three (3) times daily as needed for Muscle Spasm(s). DEXTROMETHORPHAN-GUAIFENESIN (MUCINEX DM) 60-1,200 MG TB12    Take 1 Tab by mouth every twelve (12) hours as needed for Cough. DOXEPIN (SINEQUAN) 25 MG CAPSULE    Take 1 Cap by mouth nightly. FARXIGA 10 MG TAB TABLET    take 1 tablet by mouth once daily    FOLIC ACID (FOLVITE) 1 MG TABLET    Take  by mouth daily.     GLUCOSE BLOOD VI TEST STRIPS ([place] Para ULTRA TEST) STRIP    Check blood glucose as directed    METFORMIN (GLUMETZA ER) 500 MG TG24 24 HOUR TABLET    Take 1,000 mg by mouth two (2) times a day. take 1 tablet by mouth twice a day    METHOTREXATE (RHEUMATREX) 2.5 MG TABLET    Take 7.5 mg by mouth every Tuesday. MULTIVITAMIN (ONE A DAY) TABLET    Take 1 Tab by mouth daily. OMEPRAZOLE (PRILOSEC) 10 MG CAPSULE    take 1 capsule by mouth once daily    ONDANSETRON HCL (ZOFRAN) 4 MG TABLET    Take 1 Tab by mouth every eight (8) hours as needed for Nausea. POLYETHYLENE GLYCOL (MIRALAX) 17 GRAM/DOSE POWDER    Take 17 g by mouth daily. 1 tablespoon with 8 oz of water daily    SULFASALAZINE (AZULFIDINE) 500 MG TABLET    Take 500 mg by mouth two (2) times a day.    These Medications have changed    No medications on file   Stop Taking    No medications on file     _______________________________    Notes:    Ania Toussaint MD using Dragon dictation      _______________________________

## 2020-01-01 LAB
ATRIAL RATE: 89 BPM
CALCULATED P AXIS, ECG09: 57 DEGREES
CALCULATED R AXIS, ECG10: 42 DEGREES
CALCULATED T AXIS, ECG11: 91 DEGREES
DIAGNOSIS, 93000: NORMAL
P-R INTERVAL, ECG05: 132 MS
Q-T INTERVAL, ECG07: 376 MS
QRS DURATION, ECG06: 72 MS
QTC CALCULATION (BEZET), ECG08: 457 MS
VENTRICULAR RATE, ECG03: 89 BPM

## 2020-01-03 DIAGNOSIS — E11.65 TYPE 2 DIABETES MELLITUS WITH HYPERGLYCEMIA, WITHOUT LONG-TERM CURRENT USE OF INSULIN (HCC): ICD-10-CM

## 2020-01-06 RX ORDER — DAPAGLIFLOZIN 10 MG/1
TABLET, FILM COATED ORAL
Qty: 90 TAB | Refills: 3 | Status: SHIPPED | OUTPATIENT
Start: 2020-01-06 | End: 2020-04-22

## 2020-01-08 ENCOUNTER — APPOINTMENT (OUTPATIENT)
Dept: GENERAL RADIOLOGY | Age: 60
End: 2020-01-08
Attending: EMERGENCY MEDICINE
Payer: COMMERCIAL

## 2020-01-08 ENCOUNTER — HOSPITAL ENCOUNTER (EMERGENCY)
Age: 60
Discharge: HOME | End: 2020-01-08
Attending: EMERGENCY MEDICINE | Admitting: EMERGENCY MEDICINE
Payer: COMMERCIAL

## 2020-01-08 ENCOUNTER — APPOINTMENT (OUTPATIENT)
Dept: CT IMAGING | Age: 60
End: 2020-01-08
Attending: EMERGENCY MEDICINE
Payer: COMMERCIAL

## 2020-01-08 VITALS
BODY MASS INDEX: 25.61 KG/M2 | RESPIRATION RATE: 18 BRPM | WEIGHT: 150 LBS | HEIGHT: 64 IN | DIASTOLIC BLOOD PRESSURE: 83 MMHG | TEMPERATURE: 98.5 F | SYSTOLIC BLOOD PRESSURE: 131 MMHG | OXYGEN SATURATION: 97 % | HEART RATE: 105 BPM

## 2020-01-08 DIAGNOSIS — J45.21 MILD INTERMITTENT ASTHMA WITH ACUTE EXACERBATION: Primary | ICD-10-CM

## 2020-01-08 DIAGNOSIS — R10.31 ABDOMINAL PAIN, RIGHT LOWER QUADRANT: ICD-10-CM

## 2020-01-08 DIAGNOSIS — R11.10 POST-TUSSIVE EMESIS: ICD-10-CM

## 2020-01-08 LAB
ALBUMIN SERPL-MCNC: 3 G/DL (ref 3.4–5)
ALBUMIN/GLOB SERPL: 0.7 {RATIO} (ref 0.8–1.7)
ALP SERPL-CCNC: 129 U/L (ref 45–117)
ALT SERPL-CCNC: 18 U/L (ref 13–56)
ANION GAP SERPL CALC-SCNC: 6 MMOL/L (ref 3–18)
APPEARANCE UR: CLEAR
AST SERPL-CCNC: 11 U/L (ref 10–38)
BACTERIA URNS QL MICRO: ABNORMAL /HPF
BASOPHILS # BLD: 0 K/UL (ref 0–0.1)
BASOPHILS NFR BLD: 0 % (ref 0–2)
BILIRUB SERPL-MCNC: 0.2 MG/DL (ref 0.2–1)
BILIRUB UR QL: NEGATIVE
BUN SERPL-MCNC: 18 MG/DL (ref 7–18)
BUN/CREAT SERPL: 18 (ref 12–20)
CALCIUM SERPL-MCNC: 8.9 MG/DL (ref 8.5–10.1)
CHLORIDE SERPL-SCNC: 108 MMOL/L (ref 100–111)
CO2 SERPL-SCNC: 28 MMOL/L (ref 21–32)
COLOR UR: YELLOW
CREAT SERPL-MCNC: 0.98 MG/DL (ref 0.6–1.3)
DIFFERENTIAL METHOD BLD: ABNORMAL
EOSINOPHIL # BLD: 0.1 K/UL (ref 0–0.4)
EOSINOPHIL NFR BLD: 2 % (ref 0–5)
EPITH CASTS URNS QL MICRO: ABNORMAL /LPF (ref 0–5)
ERYTHROCYTE [DISTWIDTH] IN BLOOD BY AUTOMATED COUNT: 14.8 % (ref 11.6–14.5)
GLOBULIN SER CALC-MCNC: 4.4 G/DL (ref 2–4)
GLUCOSE SERPL-MCNC: 169 MG/DL (ref 74–99)
GLUCOSE UR STRIP.AUTO-MCNC: 500 MG/DL
HCT VFR BLD AUTO: 42.5 % (ref 35–45)
HGB BLD-MCNC: 13.2 G/DL (ref 12–16)
HGB UR QL STRIP: NEGATIVE
KETONES UR QL STRIP.AUTO: NEGATIVE MG/DL
LACTATE BLD-SCNC: 1.43 MMOL/L (ref 0.4–2)
LEUKOCYTE ESTERASE UR QL STRIP.AUTO: NEGATIVE
LIPASE SERPL-CCNC: 200 U/L (ref 73–393)
LYMPHOCYTES # BLD: 1.8 K/UL (ref 0.9–3.6)
LYMPHOCYTES NFR BLD: 33 % (ref 21–52)
MCH RBC QN AUTO: 26.9 PG (ref 24–34)
MCHC RBC AUTO-ENTMCNC: 31.1 G/DL (ref 31–37)
MCV RBC AUTO: 86.7 FL (ref 74–97)
MONOCYTES # BLD: 0.5 K/UL (ref 0.05–1.2)
MONOCYTES NFR BLD: 9 % (ref 3–10)
MUCOUS THREADS URNS QL MICRO: ABNORMAL /LPF
NEUTS SEG # BLD: 3.1 K/UL (ref 1.8–8)
NEUTS SEG NFR BLD: 56 % (ref 40–73)
NITRITE UR QL STRIP.AUTO: NEGATIVE
PH UR STRIP: 6.5 [PH] (ref 5–8)
PLATELET # BLD AUTO: 279 K/UL (ref 135–420)
PMV BLD AUTO: 10.8 FL (ref 9.2–11.8)
POTASSIUM SERPL-SCNC: 3.3 MMOL/L (ref 3.5–5.5)
PROT SERPL-MCNC: 7.4 G/DL (ref 6.4–8.2)
PROT UR STRIP-MCNC: ABNORMAL MG/DL
RBC # BLD AUTO: 4.9 M/UL (ref 4.2–5.3)
RBC #/AREA URNS HPF: 0 /HPF (ref 0–5)
SODIUM SERPL-SCNC: 142 MMOL/L (ref 136–145)
SP GR UR REFRACTOMETRY: 1.02 (ref 1–1.03)
UROBILINOGEN UR QL STRIP.AUTO: 0.2 EU/DL (ref 0.2–1)
WBC # BLD AUTO: 5.6 K/UL (ref 4.6–13.2)
WBC URNS QL MICRO: ABNORMAL /HPF (ref 0–4)

## 2020-01-08 PROCEDURE — 96375 TX/PRO/DX INJ NEW DRUG ADDON: CPT

## 2020-01-08 PROCEDURE — 83690 ASSAY OF LIPASE: CPT

## 2020-01-08 PROCEDURE — 74177 CT ABD & PELVIS W/CONTRAST: CPT

## 2020-01-08 PROCEDURE — 74011000250 HC RX REV CODE- 250: Performed by: EMERGENCY MEDICINE

## 2020-01-08 PROCEDURE — 85025 COMPLETE CBC W/AUTO DIFF WBC: CPT

## 2020-01-08 PROCEDURE — 93005 ELECTROCARDIOGRAM TRACING: CPT

## 2020-01-08 PROCEDURE — 83605 ASSAY OF LACTIC ACID: CPT

## 2020-01-08 PROCEDURE — 71045 X-RAY EXAM CHEST 1 VIEW: CPT

## 2020-01-08 PROCEDURE — 96374 THER/PROPH/DIAG INJ IV PUSH: CPT

## 2020-01-08 PROCEDURE — 80053 COMPREHEN METABOLIC PANEL: CPT

## 2020-01-08 PROCEDURE — 74011250636 HC RX REV CODE- 250/636: Performed by: EMERGENCY MEDICINE

## 2020-01-08 PROCEDURE — 81001 URINALYSIS AUTO W/SCOPE: CPT

## 2020-01-08 PROCEDURE — 74011636320 HC RX REV CODE- 636/320: Performed by: EMERGENCY MEDICINE

## 2020-01-08 PROCEDURE — 99285 EMERGENCY DEPT VISIT HI MDM: CPT

## 2020-01-08 RX ORDER — ONDANSETRON 4 MG/1
TABLET, ORALLY DISINTEGRATING ORAL
Qty: 10 TAB | Refills: 0 | OUTPATIENT
Start: 2020-01-08 | End: 2020-05-27

## 2020-01-08 RX ORDER — MORPHINE SULFATE 4 MG/ML
4 INJECTION, SOLUTION INTRAMUSCULAR; INTRAVENOUS
Status: COMPLETED | OUTPATIENT
Start: 2020-01-08 | End: 2020-01-08

## 2020-01-08 RX ORDER — ONDANSETRON 2 MG/ML
4 INJECTION INTRAMUSCULAR; INTRAVENOUS
Status: COMPLETED | OUTPATIENT
Start: 2020-01-08 | End: 2020-01-08

## 2020-01-08 RX ORDER — DEXAMETHASONE SODIUM PHOSPHATE 4 MG/ML
10 INJECTION, SOLUTION INTRA-ARTICULAR; INTRALESIONAL; INTRAMUSCULAR; INTRAVENOUS; SOFT TISSUE
Status: COMPLETED | OUTPATIENT
Start: 2020-01-08 | End: 2020-01-08

## 2020-01-08 RX ORDER — TRAMADOL HYDROCHLORIDE 50 MG/1
50 TABLET ORAL
Qty: 6 TAB | Refills: 0 | Status: SHIPPED | OUTPATIENT
Start: 2020-01-08 | End: 2020-01-13

## 2020-01-08 RX ORDER — DEXAMETHASONE 2 MG/1
TABLET ORAL
Qty: 5 TAB | Refills: 0 | Status: SHIPPED | OUTPATIENT
Start: 2020-01-08 | End: 2020-08-25

## 2020-01-08 RX ORDER — IPRATROPIUM BROMIDE AND ALBUTEROL SULFATE 2.5; .5 MG/3ML; MG/3ML
3 SOLUTION RESPIRATORY (INHALATION) ONCE
Status: COMPLETED | OUTPATIENT
Start: 2020-01-08 | End: 2020-01-08

## 2020-01-08 RX ADMIN — DEXAMETHASONE SODIUM PHOSPHATE 10 MG: 4 INJECTION, SOLUTION INTRAMUSCULAR; INTRAVENOUS at 09:49

## 2020-01-08 RX ADMIN — MORPHINE SULFATE 4 MG: 4 INJECTION, SOLUTION INTRAMUSCULAR; INTRAVENOUS at 09:49

## 2020-01-08 RX ADMIN — IOPAMIDOL 92 ML: 612 INJECTION, SOLUTION INTRAVENOUS at 10:17

## 2020-01-08 RX ADMIN — ONDANSETRON 4 MG: 2 INJECTION INTRAMUSCULAR; INTRAVENOUS at 09:49

## 2020-01-08 RX ADMIN — IPRATROPIUM BROMIDE AND ALBUTEROL SULFATE 3 ML: .5; 3 SOLUTION RESPIRATORY (INHALATION) at 09:49

## 2020-01-08 NOTE — LETTER
[facility] EMERGENCY DEPT 
[address] 
[address] [facility] [address] [phone] Work/School Note Date: 1/8/2020 To Whom It May concern: 
 
Gray Griffiths was seen and treated today in the emergency room by the following provider(s): 
Attending Provider: Lito Villarreal DO. Gray Griffiths may return to work on 1/9/2020.  
 
Sincerely, 
 
 
 
 
Bennett Mercer

## 2020-01-08 NOTE — ED PROVIDER NOTES
[facility]  EMERGENCY DEPARTMENT HISTORY AND PHYSICAL EXAM       Date: 1/8/2020   Patient Name: Gray Griffiths   YOB: 1960  Medical Record Number: [id number]    HISTORY OF PRESENTING ILLNESS:     Gray Griffiths is a 61 y.o. female presenting with the noted PMH to the ED c/o cough of clear sputum with associated shortness of breath persistent since yesterday and unrelieved by her home albuterol inhaler. Patient also complains of acute on chronic right lower abdominal pain near the BOBBY drain site from her previous surgeries for bowel obstruction, this pain is exacerbated by coughing. She reports a bowel movement this morning. Patient denies fever or chest pain.     Primary Care Provider: Leobardo Cain MD   Specialist:    Past Medical History:   Past Medical History:   Diagnosis Date    Abdominal adhesions     Adnexal cyst 7/30/2019    Left    Anemia     Asthma     Chronic pelvic pain in female 7/30/2019    Diabetes mellitus     Dyskinesia     bilateral    Essential hypertension     GERD (gastroesophageal reflux disease)     Hypertension     Menopause     Microhematuria     Rheumatoid arteritis ([address] 2018    Stool color black         Past Surgical History:   Past Surgical History:   Procedure Laterality Date    COLONOSCOPY N/A 1/21/2019    COLONOSCOPY performed [facility] ENDOSCOPY    HX CHOLECYSTECTOMY  6/28/10    HX COLONOSCOPY      HX ENDOSCOPY      HX HERNIA REPAIR      HX HYSTERECTOMY  1989    Fibroids    HX KNEE REPLACEMENT Left     HX KNEE REPLACEMENT Right 06/2018    HX OOPHORECTOMY  12/2000    HX ORTHOPAEDIC Right     ankle- multiple surgeries    HX SMALL BOWEL RESECTION  12/2000    HX SMALL BOWEL RESECTION  02/21/2017    Dr. Rupert Landers          Social History:   Social History     Tobacco Use    Smoking status: Never Smoker    Smokeless tobacco: Never Used   Substance Use Topics  Alcohol use: No    Drug use: No        Allergies: Allergies   Allergen Reactions    Macrodantin [Nitrofurantoin Macrocrystalline] Itching and Other (comments)    Tape [Adhesive] Other (comments)     Paper tape-- feels like burning skin  Burned skin when had wound vac        REVIEW OF SYSTEMS:  Review of Systems   Constitutional: Negative for chills and fever. HENT: Negative for ear pain and sore throat. Eyes: Negative for pain and visual disturbance. Respiratory: Positive for cough and shortness of breath. Cardiovascular: Negative for chest pain and palpitations. Gastrointestinal: Positive for abdominal pain. Negative for diarrhea, nausea and vomiting. Genitourinary: Negative for flank pain. Musculoskeletal: Negative for back pain and neck pain. Neurological: Negative for syncope and headaches. Psychiatric/Behavioral: Negative for agitation. The patient is not nervous/anxious. PHYSICAL EXAM:  Vitals:    01/08/20 0900 01/08/20 0930 01/08/20 0945 01/08/20 1000   BP: (!) 152/93 125/82  145/88   Pulse: 96 94 84 100   Resp: 16 21 15 15   Temp:       SpO2: 99% 100% 100% 99%   Weight:       Height:           Physical Exam  Vitals signs and nursing note reviewed. Constitutional:       General: She is not in acute distress. Appearance: She is well-developed. She is obese. She is not ill-appearing, toxic-appearing or diaphoretic. HENT:      Head: Normocephalic and atraumatic. Nose: Nose normal.      Mouth/Throat:      Mouth: Mucous membranes are moist.   Eyes:      General: No scleral icterus. Conjunctiva/sclera: Conjunctivae normal.      Pupils: Pupils are equal, round, and reactive to light. Neck:      Musculoskeletal: Normal range of motion and neck supple. Trachea: No tracheal deviation. Cardiovascular:      Rate and Rhythm: Normal rate and regular rhythm. Heart sounds: No murmur.    Pulmonary:      Effort: Pulmonary effort is normal. No respiratory distress. Breath sounds: Normal breath sounds. No wheezing, rhonchi or rales. Abdominal:      Palpations: Abdomen is soft. Tenderness: There is tenderness (focal TTP around BOBBY well healed scar in RLQ). There is no guarding or rebound. Musculoskeletal: Normal range of motion. General: No deformity. Skin:     General: Skin is warm and dry. Capillary Refill: Capillary refill takes less than 2 seconds. Findings: No rash. Neurological:      General: No focal deficit present. Mental Status: She is alert and oriented to person, place, and time. Mental status is at baseline. Cranial Nerves: No cranial nerve deficit. Comments: No gross neuro deficit   Psychiatric:         Mood and Affect: Mood normal.         Medications   albuterol-ipratropium (DUO-NEB) 2.5 MG-0.5 MG/3 ML (3 mL Nebulization Given 1/8/20 0949)   dexamethasone (DECADRON) 4 mg/mL injection 10 mg (10 mg IntraVENous Given 1/8/20 0949)   morphine injection 4 mg (4 mg IntraVENous Given 1/8/20 0949)   ondansetron (ZOFRAN) injection 4 mg (4 mg IntraVENous Given 1/8/20 0949)   iopamidol (ISOVUE 300) 61 % contrast injection 100 mL (92 mL IntraVENous Given 1/8/20 1017)       RESULTS:    Labs -   Labs Reviewed   CBC WITH AUTOMATED DIFF - Abnormal; Notable for the following components:       Result Value    RDW 14.8 (*)     All other components within normal limits   METABOLIC PANEL, COMPREHENSIVE - Abnormal; Notable for the following components:    Potassium 3.3 (*)     Glucose 169 (*)     GFR est non-AA 58 (*)     Alk.  phosphatase 129 (*)     Albumin 3.0 (*)     Globulin 4.4 (*)     A-G Ratio 0.7 (*)     All other components within normal limits   URINALYSIS W/ RFLX MICROSCOPIC - Abnormal; Notable for the following components:    Protein TRACE (*)     Glucose 500 (*)     All other components within normal limits   URINE MICROSCOPIC ONLY - Abnormal; Notable for the following components:    Bacteria 2+ (*)     Mucus FEW (*)     All other components within normal limits   LIPASE   POC LACTIC ACID       Radiologic Studies -  Ct Abd Pelv W Cont    Result Date: 1/8/2020  EXAM: CT of the abdomen and pelvis INDICATION: Right-sided pelvic pain at site of previous surgical drain. History of small bowel resection. COMPARISON: 12/31/2019, 25/7/3667 TECHNIQUE: Helical volumetric scanning through the abdomen and pelvis was performed without oral, with nonionic intravenous contrast.  Axial, coronal and sagittal reconstructions were created. One or more dose reduction techniques were used on this CT: automated exposure control, adjustment of the mAs and/or kVp according to patient size, and iterative reconstruction techniques. The specific techniques used on this CT exam have been documented in the patient's electronic medical record. Digital Imaging and Communications in Medicine (DICOM) format image data are available to nonaffiliated external healthcare facilities or entities on a secure, media free, reciprocally searchable basis with patient authorization for at least a 12-month period after this study. _______________ FINDINGS: LOWER CHEST: Mild bibasilar dependent atelectasis is present. No pleural or pericardial effusion. LIVER, BILIARY: Liver is slightly, diffusely low density is previously seen compatible with hepatic steatosis. No focal liver lesion. No biliary dilation. Cholecystectomy. PANCREAS: Normal. SPLEEN: Normal. ADRENALS: Normal. KIDNEYS: Bilateral parapelvic renal cysts are again identified, left greater than right. Kidneys are otherwise normal. No ureteral or bladder calculi. LYMPH NODES: No enlarged lymph nodes. GASTROINTESTINAL TRACT: Anastomotic staples are again identified in the upper pelvis in the midline just left of midline, small bowel with small bowel. No bowel dilatation.  Mild wall thickening is present involving small bowel near the anastomosis, although improved since the more recent prior exam. No colonic wall thickening. Appendix is not visualized, presumably resected. PELVIC ORGANS: Prior hysterectomy. 3.8 cm unilocular thin-walled water attenuation focus is present in the left pelvis, without change, likely an ovarian cyst. VASCULATURE: Within normal limits. BONES: No acute or aggressive osseous abnormalities identified. OTHER: Subcutaneous scarring is present from the prior midline incision lower abdomen into the pelvis. Mild adjacent subcutaneous fat stranding is again noted inferiorly, slightly improved. In addition, mild fat stranding is present deep to the incision within the anterior abdomen, also improved. No organized fluid collections. A thin band of linear density is present in the right anterior subcutaneous fat, with the previous drain had been. _______________     IMPRESSION: 1. Postoperative fat stranding in the anterior abdominal wall and deep to the anterior abdominal and pelvic wall, slightly improved. No organized fluid collections. Minimal linear density at the previous drain track right lateral subcutaneous pelvis, not unexpected. 2. Small bowel anastomosis in the upper pelvis in the midline and just left of midline without evidence of obstruction, with minimal small bowel wall thickening adjacent to the anastomosis, improved, not expected to be significant. 3. Hysterectomy. Stable 3.8 cm unilocular left pelvic cyst, likely ovarian in origin. 4. Hepatic steatosis. Xr Chest Port    Result Date: 1/8/2020  EXAM: XR CHEST PORT CLINICAL INDICATION/HISTORY: cough -Additional: None COMPARISON: 12/31/2019 TECHNIQUE: Portable frontal view of the chest _______________ FINDINGS: SUPPORT DEVICES: None. HEART AND MEDIASTINUM: Unremarkable. LUNGS AND PLEURAL SPACES: Unremarkable. BONY THORAX AND SOFT TISSUES: Unremarkable. _______________     IMPRESSION: No active cardiopulmonary disease. EKG interpretation:   Time 0850  Sinus rhythm rate 86. Normal axis.   Nonspecific T wave abnormalities in the lateral leads. Intervals within normal limits. No acute ST elevation or depression noted. ECG grossly unchanged from prior. MEDICAL DECISION MAKING    DDX: Asthma exacerbation, URI, pneumonia, pneumothorax, muscle strain, hernia, appendicitis, postop complication    61 y.o. female with noted past medical history who presented with cough shortness of breath and nasal congestion was concerning for a URI which triggered a mild asthma exacerbation. Patient has been coughing so much she reports increased pain in the area of her previous BOBBY drain site which is likely due to an abdominal wall muscle strain from repetitive coughing. Patient treated symptomatically in the ED. ED Course as of Jan 08 1231   Wed Jan 08, 2020   1217 No significant abnormalities on work-up. Patient will be treated for COPD exacerbation. [KG]      ED Course User Index  [KG] Steffany Mahan DO   Patient reports improvement on reevaluation. Patient discharged with prescriptions for Zofran, Decadron to be taken the day after tomorrow and a refill of her albuterol medications. As well as a few tramadol for as needed for abd wall pain. Patient has no new complaints, changes, or physical findings. Results were reviewed with the patient. Pt's questions and concerns were addressed. Care plan was outlined, including follow-up with PCP/specialist and return precautions were discussed. Patient is felt to be stable for discharge at this time. Diagnosis   Clinical Impression:   1. Mild intermittent asthma with acute exacerbation    2. Post-tussive emesis    3.  Abdominal pain, right lower quadrant           Follow-up Information     Follow up With Specialties Details Why [facility] EMERGENCY DEPT Emergency Medicine  If symptoms worsen [address]  [phone]    Gurdeep Trejo MD Family Practice, Internal Medicine Schedule an appointment as soon as possible for a visit  [address]      Calli Hernandez MD Surgery  If you continue to have pain at your surgical site  [address]  [address]  [phone]            Current Discharge Medication List      START taking these medications    Details   ondansetron (ZOFRAN ODT) 4 mg disintegrating tablet Take 1-2 tablets every 6-8 hours as needed for nausea and vomiting. Qty: 10 Tab, Refills: 0      dexAMETHasone (DECADRON) 2 mg tablet Take 10 mg on the day after tomorrow  Qty: 5 Tab, Refills: 0      albuterol sulfate (PROAIR RESPICLICK) 90 mcg/actuation aepb Take 2 Puffs by inhalation every four (4) hours as needed for Wheezing, Shortness of Breath or Cough. Qty: 1 Inhaler, Refills: 0      traMADol (ULTRAM) 50 mg tablet Take 1 Tab by mouth every four (4) hours as needed for Pain for up to 5 days. Max Daily Amount: 300 mg.   Qty: 6 Tab, Refills: 0    Associated Diagnoses: Abdominal pain, right lower quadrant         STOP taking these medications       ondansetron hcl (ZOFRAN) 4 mg tablet Comments:   Reason for Stopping:         albuterol (PROVENTIL HFA, VENTOLIN HFA, PROAIR HFA) 90 mcg/actuation inhaler Comments:   Reason for Stopping:               _______________________________   Attestations:

## 2020-01-08 NOTE — ED TRIAGE NOTES
Patient placed to monitor, vitals stable bp elevated per pt meds not taken this am. Rates pain to R lower abd at a 10. Per patient chest feels congested. Call bell within reach will continue to moniotor.

## 2020-01-08 NOTE — ED TRIAGE NOTES
\"I had a BOBBY drain removed in November and a month later the pains started near the hole and getting worse. My asthma also acting up. I did my inhalers this morning and not giving me any relief. I also vomited last night and it was brown and scary. \"

## 2020-01-09 LAB
ATRIAL RATE: 86 BPM
CALCULATED P AXIS, ECG09: 65 DEGREES
CALCULATED R AXIS, ECG10: 41 DEGREES
CALCULATED T AXIS, ECG11: 94 DEGREES
DIAGNOSIS, 93000: NORMAL
P-R INTERVAL, ECG05: 134 MS
Q-T INTERVAL, ECG07: 336 MS
QRS DURATION, ECG06: 68 MS
QTC CALCULATION (BEZET), ECG08: 402 MS
VENTRICULAR RATE, ECG03: 86 BPM

## 2020-01-10 NOTE — PROGRESS NOTES
José Miguel Later is a 62 y.o.  female and presents with    Chief Complaint   Patient presents with    Pre-op Exam    Sleep Problem   [facility] Follow Up     Subjective:  Pre -op Physical   Patient here for a Pre op physical exam.The patient reports problems - possible sleep apnea, chronic abdominal pain, osteoarthritis of the knee  Do you take any herbs or supplements that were not prescribed by a doctor? no Are you taking calcium supplements? no Are you taking aspirin daily? yes    She has chronic abdominal pain intermittently with nausea. She has been in the ER with this complaint several times in the past few months. She never underwent scar revision as discussed previously. Cardiovascular Review:  The patient has diabetes, hypertension and hyperlipidemia. Diet and Lifestyle: generally follows a low fat low cholesterol diet, generally follows a low sodium diet, follows a diabetic diet regularly, exercises sporadically, nonsmoker  Home BP Monitoring: is not measured at home. Pertinent ROS: taking medications as instructed, no medication side effects noted, no TIA's, no chest pain on exertion, no dyspnea on exertion, no swelling of ankles. Diabetes Mellitus:  She has diabetes mellitus, hypertension and hyperlipidemia. Diabetic ROS - medication compliance: compliant most of the time, diabetic diet compliance: compliant most of the time. Lab review: labs reviewed, I note that glycosylated hemoglobin mildly abnormal but acceptable. Asthma Review:  The patient is being seen for follow up of asthma, not currently in exacerbation. Asthma symptoms occur: less than 2x/week, infrequently. Wheezing when present is described as mild and easily relieved with rescue bronchodilator. Current limitations in activity from asthma: none. Number of days of school or work missed in the last month: 0. Frequency of use of quick-relief meds: < 2 x / week. Regimen compliance:  The patient You are due for fasting labs in May, 2020. Treating Incontinence in Women: Nonsurgical Methods    The best treatment for you will depend on the type of incontinence you have.  Your symptoms, age, and any underlying problems that are found also affect reports adherence to this regimen. Patient does not smoke cigarettes. Patient Active Problem List   Diagnosis Code    Osteoarthritis of left knee M17.12    Hypertension I10    Hyperlipidemia E78.5    GERD (gastroesophageal reflux disease) K21.9    Asthma J45.909    Type 2 diabetes mellitus ([facility] E11.9    SBO (small bowel obstruction) ([facility]) K56.609    Chronic abdominal pain R10.9, G89.29    Post-operative pain G89.18    Chest pain R07.9    Essential hypertension, benign I10    Sural neuritis G57.80    Type 2 diabetes mellitus with nephropathy ([facility]) E11.21    Non-intractable cyclical vomiting with nausea G43. A0     Patient Active Problem List    Diagnosis Date Noted    Non-intractable cyclical vomiting with nausea 01/24/2018    Type 2 diabetes mellitus with nephropathy ([facility] 01/19/2018    Chronic abdominal pain 03/14/2017    Post-operative pain 03/14/2017    [facility] 02/21/2017    Osteoarthritis of left knee 06/27/2016    Hypertension 06/27/2016    Hyperlipidemia 06/27/2016    GERD (gastroesophageal reflux disease) 06/27/2016    Asthma 06/27/2016    Type 2 diabetes mellitus ([facility] 06/27/2016    Sural neuritis 06/23/2014    Chest pain 04/20/2014    Essential hypertension, benign 02/27/2012     Current Outpatient Prescriptions   Medication Sig Dispense Refill    bisoprolol-hydroCHLOROthiazide (ZIAC) 2.5-6.25 mg per tablet Take 1 Tab by mouth daily. 90 Tab 3    senna (SENOKOT) 8.6 mg tablet Take 1 Tab by mouth daily. 30 Tab 0    metFORMIN (GLUMETZA ER) 500 mg TG24 24 hour tablet take 1 tablet by mouth twice a day 60 Tab 2    amLODIPine (NORVASC) 2.5 mg tablet take 1 tablet by mouth NIGHTLY 90 Tab 1    dicyclomine (BENTYL) 10 mg capsule Take 10 mg by mouth 4 times daily (before meals and nightly).  doxepin (SINEQUAN) 10 mg capsule Take 1 Cap by mouth nightly. 30 Cap 11    dapagliflozin (FARXIGA) 10 mg tab tablet Take 1 Tab by mouth daily.  Indications: © 2156-2095 [address] [facility], [facility] [place]. All rights reserved. This information is not intended as a substitute for professional medical care. Always follow your healthcare professional's instructions.         Ernst MOLINA type 2 diabetes mellitus 90 Tab 3    omeprazole (PRILOSEC) 10 mg capsule Take 1 Cap by mouth daily. 30 Cap 11    albuterol (PROVENTIL HFA, VENTOLIN HFA, PROAIR HFA) 90 mcg/actuation inhaler Take 1 Puff by inhalation every six (6) hours as needed for Wheezing. 1 Inhaler 0    atorvastatin (LIPITOR) 40 mg tablet Take 1 Tab by mouth nightly. 90 Tab 3    glucose blood VI test strips (ONETOUCH ULTRA TEST) strip Check blood glucose as directed 100 Strip 1    polyethylene glycol (MIRALAX) 17 gram/dose powder Take 17 g by mouth two (2) times a day. 1 capful with 8 oz of water daily 119 g 0    ondansetron (ZOFRAN ODT) 4 mg disintegrating tablet Take 1 Tab by mouth every eight (8) hours as needed for Nausea. 12 Tab 0    oxyCODONE-acetaminophen (PERCOCET) 5-325 mg per tablet Take 1 Tab by mouth every six (6) hours as needed for Pain. Max Daily Amount: 4 Tabs.  12 Tab 0    ALLERGY RELIEF, CETIRIZINE, 10 mg tablet take 1 tablet by mouth once daily  0     Allergies   Allergen Reactions    Macrodantin [Nitrofurantoin Macrocrystalline] Itching and Other (comments)    Tape [Adhesive] Other (comments)     Paper tape-- feels like burning skin  Burned skin when had wound vac     Past Medical History:   Diagnosis Date    Abdominal adhesions     Anemia     Arthritis     all over the body    Asthma     Cocaine abuse     Diabetes mellitus     Dyskinesia     bilateral    Essential hypertension     GERD (gastroesophageal reflux disease)     Hypertension     Menopause     Microhematuria     Stool color black      Past Surgical History:   Procedure Laterality Date    HX CHOLECYSTECTOMY  6/28/10    HX COLONOSCOPY      HX ENDOSCOPY      HX HERNIA REPAIR      HX HYSTERECTOMY      Fibroids    HX KNEE REPLACEMENT Left     HX OOPHORECTOMY  12/2000    HX ORTHOPAEDIC Right     ankle- multiple surgeries    HX SMALL BOWEL RESECTION  12/2000    HX SMALL BOWEL RESECTION  02/21/2017    Dr. Anna Leigh · Do your Nati Pin as often as you can. The more you do them, the faster you’ll feel the results. · Pick an activity you do often as a reminder. For instance, do your Kegels every time you sit down.   · Tighten your pelvic floor before you sneeze, get up fro LOCALIZATION-EXCISION       Family History   Problem Relation Age of Onset    Hypertension Other      parent    Breast Cancer Other 21    Heart Disease Father     Hypertension Father     Diabetes Father     Diabetes Mother     Hypertension Other      sibling    Diabetes Other      parent    Diabetes Sister     Kidney Disease Brother     Diabetes Brother      Social History   Substance Use Topics    Smoking status: Never Smoker    Smokeless tobacco: Never Used    Alcohol use No       ROS   Constitutional: Negative for chills, fatigue and fever. HENT: Negative.  Negative for sore throat.    Eyes: Negative.    Respiratory: Negative for cough and shortness of breath.    Cardiovascular: Negative for chest pain and palpitations. Gastrointestinal: Positive for abdominal pain, diarrhea, nausea and vomiting. Genitourinary: Positive for frequency. Negative for dysuria. Musculoskeletal: Negative.    Skin: Negative.    Neurological: Negative for dizziness, weakness, light-headedness and headaches. Psychiatric/Behavioral: Negative.         All other systems reviewed and are negative. Objective:  Vitals:    06/12/18 1419   BP: 136/90   Pulse: 89   Resp: 16   Temp: 97.1 °F (36.2 °C)   TempSrc: Oral   SpO2: 96%   Weight: 166 lb (75.3 kg)   Height: 5' 4\" (1.626 m)   PainSc:   7   PainLoc: Knee     Constitutional: She is oriented to person, place, and time. She appears well-developed and well-nourished. No distress. HENT:   Head: Normocephalic and atraumatic. Mouth/Throat: Oropharynx is clear and moist.   Eyes: Conjunctivae are normal. No scleral icterus. Neck: Neck supple. No JVD present. No tracheal deviation present. Cardiovascular: Normal rate, regular rhythm and normal heart sounds.    Pulmonary/Chest: Effort normal and breath sounds normal. No respiratory distress. She has no wheezes. Abdominal: Soft. Normal appearance and bowel sounds are normal. She exhibits no distension and no mass. There is tenderness in the right lower quadrant and left lower quadrant. There is no rebound, no guarding and no CVA tenderness. Midline well healed scar in center of abdomen   Musculoskeletal: Normal range of motion. Neurological: She is alert and oriented to person, place, and time. She has normal strength. Gait normal. GCS eye subscore is 4. GCS verbal subscore is 5. GCS motor subscore is 6.    Diabetic foot exam:     Left Foot:   Visual Exam: normal    Pulse DP: 2+ (normal)   Filament test: normal sensation       Right Foot:   Visual Exam: normal    Pulse DP: 2+ (normal)   Filament test: normal sensation     LABS   Hgb a1c 7.5  INR 0.88  Hgb 14.4  WBC 9.4  TESTS  EKG sinus rhythm  KUB negative    Assessment/Plan:    1. Preoperative examination  Cleared for surgery    2. Type 2 diabetes mellitus with hyperglycemia, without long-term current use of insulin ([facility])  Goal hgb a1c <7; at goal; continue treatment  -  DIABETES FOOT EXAM  - MICROALBUMIN, UR, RAND W/ MICROALB/CREAT RATIO; Future  - LIPID PANEL; Future    3. Essential hypertension  Goal <130/80; at goal; continue medication    4. Chronic abdominal pain  F/u with gynecology as scheduled    5. Chronic tension-type headache, not intractable  Continue prophylaxis medication    6. Mild intermittent asthma without complication  Continue inhaled therapy    7. Obstructive sleep apnea  Refer for sleep study due to reported apnea and daytime somnolence  - SLEEP MEDICINE REFERRAL    8. Encounter for immunization    - Pneumococcal Polysac (PPSV) 23-Valent    Lab review: labs reviewed, I note that glycosylated hemoglobin normal, renal functions normal, liver functions normal, hemogram normal, INR normal      I have discussed the diagnosis with the patient and the intended plan as seen in the above orders. The patient has received an after-visit summary and questions were answered concerning future plans.   I have discussed medication side effects and warnings with the patient as well. I have reviewed the plan of care with the patient, accepted their input and they are in agreement with the treatment goals. Follow-up Disposition:  Return in about 4 months (around 10/12/2018) for diabetes.

## 2020-01-14 ENCOUNTER — HOSPITAL ENCOUNTER (EMERGENCY)
Age: 60
Discharge: HOME | End: 2020-01-14
Attending: EMERGENCY MEDICINE | Admitting: EMERGENCY MEDICINE
Payer: COMMERCIAL

## 2020-01-14 ENCOUNTER — APPOINTMENT (OUTPATIENT)
Dept: GENERAL RADIOLOGY | Age: 60
End: 2020-01-14
Attending: PHYSICIAN ASSISTANT
Payer: COMMERCIAL

## 2020-01-14 VITALS
DIASTOLIC BLOOD PRESSURE: 107 MMHG | WEIGHT: 150 LBS | TEMPERATURE: 98.4 F | RESPIRATION RATE: 18 BRPM | HEART RATE: 90 BPM | BODY MASS INDEX: 25.61 KG/M2 | HEIGHT: 64 IN | SYSTOLIC BLOOD PRESSURE: 154 MMHG | OXYGEN SATURATION: 99 %

## 2020-01-14 DIAGNOSIS — J45.31 MILD PERSISTENT ASTHMA WITH ACUTE EXACERBATION: Primary | ICD-10-CM

## 2020-01-14 LAB
ALBUMIN SERPL-MCNC: 3.8 G/DL (ref 3.4–5)
ALBUMIN/GLOB SERPL: 0.8 {RATIO} (ref 0.8–1.7)
ALP SERPL-CCNC: 168 U/L (ref 45–117)
ALT SERPL-CCNC: 22 U/L (ref 13–56)
ANION GAP SERPL CALC-SCNC: 6 MMOL/L (ref 3–18)
APPEARANCE UR: CLEAR
AST SERPL-CCNC: 9 U/L (ref 10–38)
BASOPHILS # BLD: 0 K/UL (ref 0–0.1)
BASOPHILS NFR BLD: 0 % (ref 0–2)
BILIRUB SERPL-MCNC: 0.3 MG/DL (ref 0.2–1)
BILIRUB UR QL: NEGATIVE
BUN SERPL-MCNC: 16 MG/DL (ref 7–18)
BUN/CREAT SERPL: 12 (ref 12–20)
CALCIUM SERPL-MCNC: 9.7 MG/DL (ref 8.5–10.1)
CHLORIDE SERPL-SCNC: 101 MMOL/L (ref 100–111)
CO2 SERPL-SCNC: 29 MMOL/L (ref 21–32)
COLOR UR: YELLOW
CREAT SERPL-MCNC: 1.39 MG/DL (ref 0.6–1.3)
DIFFERENTIAL METHOD BLD: ABNORMAL
EOSINOPHIL # BLD: 0.1 K/UL (ref 0–0.4)
EOSINOPHIL NFR BLD: 2 % (ref 0–5)
ERYTHROCYTE [DISTWIDTH] IN BLOOD BY AUTOMATED COUNT: 14.9 % (ref 11.6–14.5)
GLOBULIN SER CALC-MCNC: 4.5 G/DL (ref 2–4)
GLUCOSE SERPL-MCNC: 260 MG/DL (ref 74–99)
GLUCOSE UR STRIP.AUTO-MCNC: >1000 MG/DL
HCT VFR BLD AUTO: 45.3 % (ref 35–45)
HGB BLD-MCNC: 14.4 G/DL (ref 12–16)
HGB UR QL STRIP: NEGATIVE
KETONES UR QL STRIP.AUTO: NEGATIVE MG/DL
LEUKOCYTE ESTERASE UR QL STRIP.AUTO: NEGATIVE
LIPASE SERPL-CCNC: 169 U/L (ref 73–393)
LYMPHOCYTES # BLD: 1.6 K/UL (ref 0.9–3.6)
LYMPHOCYTES NFR BLD: 21 % (ref 21–52)
MCH RBC QN AUTO: 27.4 PG (ref 24–34)
MCHC RBC AUTO-ENTMCNC: 31.8 G/DL (ref 31–37)
MCV RBC AUTO: 86.3 FL (ref 74–97)
MONOCYTES # BLD: 0.4 K/UL (ref 0.05–1.2)
MONOCYTES NFR BLD: 5 % (ref 3–10)
NEUTS SEG # BLD: 5.3 K/UL (ref 1.8–8)
NEUTS SEG NFR BLD: 72 % (ref 40–73)
NITRITE UR QL STRIP.AUTO: NEGATIVE
PH UR STRIP: 5.5 [PH] (ref 5–8)
PLATELET # BLD AUTO: 316 K/UL (ref 135–420)
PMV BLD AUTO: 10.7 FL (ref 9.2–11.8)
POTASSIUM SERPL-SCNC: 3.6 MMOL/L (ref 3.5–5.5)
PROT SERPL-MCNC: 8.3 G/DL (ref 6.4–8.2)
PROT UR STRIP-MCNC: NEGATIVE MG/DL
RBC # BLD AUTO: 5.25 M/UL (ref 4.2–5.3)
SODIUM SERPL-SCNC: 136 MMOL/L (ref 136–145)
SP GR UR REFRACTOMETRY: 1.03 (ref 1–1.03)
UROBILINOGEN UR QL STRIP.AUTO: 0.2 EU/DL (ref 0.2–1)
WBC # BLD AUTO: 7.4 K/UL (ref 4.6–13.2)

## 2020-01-14 PROCEDURE — 99283 EMERGENCY DEPT VISIT LOW MDM: CPT

## 2020-01-14 PROCEDURE — 77030029684 HC NEB SM VOL KT MONA -A

## 2020-01-14 PROCEDURE — 83690 ASSAY OF LIPASE: CPT

## 2020-01-14 PROCEDURE — 85025 COMPLETE CBC W/AUTO DIFF WBC: CPT

## 2020-01-14 PROCEDURE — 74019 RADEX ABDOMEN 2 VIEWS: CPT

## 2020-01-14 PROCEDURE — 81003 URINALYSIS AUTO W/O SCOPE: CPT

## 2020-01-14 PROCEDURE — 74011000250 HC RX REV CODE- 250: Performed by: PHYSICIAN ASSISTANT

## 2020-01-14 PROCEDURE — 71046 X-RAY EXAM CHEST 2 VIEWS: CPT

## 2020-01-14 PROCEDURE — 80053 COMPREHEN METABOLIC PANEL: CPT

## 2020-01-14 RX ORDER — IPRATROPIUM BROMIDE AND ALBUTEROL SULFATE 2.5; .5 MG/3ML; MG/3ML
3 SOLUTION RESPIRATORY (INHALATION)
Status: COMPLETED | OUTPATIENT
Start: 2020-01-14 | End: 2020-01-14

## 2020-01-14 RX ADMIN — IPRATROPIUM BROMIDE AND ALBUTEROL SULFATE 3 ML: .5; 3 SOLUTION RESPIRATORY (INHALATION) at 17:16

## 2020-01-14 RX ADMIN — IPRATROPIUM BROMIDE AND ALBUTEROL SULFATE 3 ML: .5; 3 SOLUTION RESPIRATORY (INHALATION) at 15:45

## 2020-01-14 NOTE — LETTER
[address] [facility] EMERGENCY DEPT 
[address] 
[address] [facility] [id number] [phone] Work/School Note Date: 1/14/2020 To Whom It May concern: 
 
Selma Holt was seen and treated today in the emergency room by the following provider(s): 
Attending Provider: Nehemias Means MD 
Physician Assistant: Jeffy Haney PA-C. Selma Holt may return to work on 1/15/2020. Sincerely, Christia Hodgkins BSN, RN

## 2020-01-14 NOTE — ED TRIAGE NOTES
Pt began having stomach pains last night and experiencing N/V. Continued today and began having a cough and some SOB at work that was partly alleviated with her inhaler.

## 2020-01-14 NOTE — ED PROVIDER NOTES
EMERGENCY DEPARTMENT HISTORY AND PHYSICAL EXAM    3:28 PM      Date: 1/14/2020  Patient Name: Levy Troy    History of Presenting Illness     Chief Complaint   Patient presents with    Cough    Abdominal Pain         History Provided By: Patient    Additional History (Context): Levy Troy is a 61 y.o. female with diabetes, hypertension and GERD who presents with complaints of a cough and abdominal pain. Patient reports a history of asthma and states that she has taken her inhaler without any relief. Patient also reports acute on chronic pain near the previous BOBBY drain site which is exacerbated with cough. Patient states she did vomit last night and feels dehydrated. PCP: Lizette Franklin MD    Current Facility-Administered Medications   Medication Dose Route Frequency Provider Last Rate Last Dose    albuterol-ipratropium (DUO-NEB) 2.5 MG-0.5 MG/3 ML  3 mL Nebulization NOW Dorothy Norman PA-C         Current Outpatient Medications   Medication Sig Dispense Refill    ondansetron (ZOFRAN ODT) 4 mg disintegrating tablet Take 1-2 tablets every 6-8 hours as needed for nausea and vomiting. 10 Tab 0    dexAMETHasone (DECADRON) 2 mg tablet Take 10 mg on the day after tomorrow 5 Tab 0    albuterol sulfate (PROAIR RESPICLICK) 90 mcg/actuation aepb Take 2 Puffs by inhalation every four (4) hours as needed for Wheezing, Shortness of Breath or Cough. 1 Inhaler 0    FARXIGA 10 mg tab tablet take 1 tablet by mouth once daily 90 Tab 3    budesonide-formoterol (SYMBICORT) 160-4.5 mcg/actuation HFAA Take 2 Puffs by inhalation two (2) times a day. 1 Inhaler 2    amLODIPine (NORVASC) 5 mg tablet Take 1 Tab by mouth daily. 30 Tab 0    dextromethorphan-guaiFENesin (MUCINEX DM) 60-1,200 mg Tb12 Take 1 Tab by mouth every twelve (12) hours as needed for Cough. 20 Tab 0    cyclobenzaprine (FLEXERIL) 10 mg tablet Take 1 Tab by mouth three (3) times daily as needed for Muscle Spasm(s).  20 Tab 0    doxepin (SINEQUAN) 25 mg capsule Take 1 Cap by mouth nightly. 30 Cap 2    polyethylene glycol (MIRALAX) 17 gram/dose powder Take 17 g by mouth daily. 1 tablespoon with 8 oz of water daily 595 g 0    metFORMIN (GLUMETZA ER) 500 mg TG24 24 hour tablet Take 1,000 mg by mouth two (2) times a day. take 1 tablet by mouth twice a day 120 Tab 11    multivitamin (ONE A DAY) tablet Take 1 Tab by mouth daily.  ALLERGY RELIEF, CETIRIZINE, 10 mg tablet take 1 tablet by mouth once daily 90 Tab 3    bisoprolol-hydroCHLOROthiazide (ZIAC) 2.5-6.25 mg per tablet Take 1 Tab by mouth daily. 90 Tab 3    omeprazole (PRILOSEC) 10 mg capsule take 1 capsule by mouth once daily 30 Cap 11    folic acid (FOLVITE) 1 mg tablet Take  by mouth daily.  methotrexate (RHEUMATREX) 2.5 mg tablet Take 7.5 mg by mouth every Tuesday.  sulfaSALAzine (AZULFIDINE) 500 mg tablet Take 500 mg by mouth two (2) times a day.       glucose blood VI test strips (ONETOUCH ULTRA TEST) strip Check blood glucose as directed 100 Strip 1       Past History     Past Medical History:  Past Medical History:   Diagnosis Date    Abdominal adhesions     Adnexal cyst 7/30/2019    Left    Anemia     Asthma     Chronic pelvic pain in female 7/30/2019    Diabetes mellitus     Dyskinesia     bilateral    Essential hypertension     GERD (gastroesophageal reflux disease)     Hypertension     Menopause     Microhematuria     Rheumatoid arteritis ([facility] [address] 2018    Stool color black        Past Surgical History:  Past Surgical History:   Procedure Laterality Date    COLONOSCOPY N/A 1/21/2019    COLONOSCOPY performed by Dewayne Garvin [facility] at [facility] ENDOSCOPY    HX CHOLECYSTECTOMY  6/28/10    HX COLONOSCOPY      HX ENDOSCOPY      HX HERNIA REPAIR      HX HYSTERECTOMY  1989    Fibroids    HX KNEE REPLACEMENT Left     HX KNEE REPLACEMENT Right 06/2018    HX OOPHORECTOMY  12/2000    HX ORTHOPAEDIC Right     ankle- multiple surgeries    HX SMALL BOWEL RESECTION  12/2000    HX SMALL BOWEL RESECTION  02/21/2017    Dr. Kei Mosher         Family History:  Family History   Problem Relation Age of Onset    Hypertension Other         parent    Breast Cancer Other 21    Heart Disease Father     Diabetes Father     Lung Disease Father     Diabetes Mother     Hypertension Other         sibling    Diabetes Other         parent    Kidney Disease Brother     Diabetes Brother     Lung Disease Brother        Social History:  Social History     Tobacco Use    Smoking status: Never Smoker    Smokeless tobacco: Never Used   Substance Use Topics    Alcohol use: No    Drug use: No       Allergies: Allergies   Allergen Reactions    Macrodantin [Nitrofurantoin Macrocrystalline] Itching and Other (comments)    Tape [Adhesive] Other (comments)     Paper tape-- feels like burning skin  Burned skin when had wound vac         Review of Systems       Review of Systems   Constitutional: Negative for chills, diaphoresis, fatigue and fever. HENT: Negative for congestion, sinus pressure, sinus pain and sore throat. Respiratory: Positive for cough and wheezing. Negative for chest tightness and shortness of breath. Cardiovascular: Negative for chest pain. Gastrointestinal: Positive for abdominal pain, nausea and vomiting. Negative for constipation and diarrhea. Genitourinary: Negative for dysuria, flank pain, hematuria and urgency. Neurological: Negative for dizziness, syncope, weakness, light-headedness, numbness and headaches. All other systems reviewed and are negative. Physical Exam     Visit Vitals  BP (!) 154/107   Pulse 90   Temp 98.4 °F (36.9 °C)   Resp 18   Ht 5' 4\" (1.626 m)   Wt 68 kg (150 lb)   SpO2 99%   BMI 25.75 kg/m²         Physical Exam  Vitals signs reviewed. Constitutional:       General: She is not in acute distress. Appearance: She is well-developed and normal weight.  She is not ill-appearing or toxic-appearing. HENT:      Head: Normocephalic and atraumatic. Right Ear: External ear normal.      Left Ear: External ear normal.      Nose: Nose normal.      Mouth/Throat:      Mouth: Mucous membranes are moist.      Pharynx: Oropharynx is clear. Eyes:      Extraocular Movements: Extraocular movements intact. Neck:      Musculoskeletal: Neck supple. Cardiovascular:      Rate and Rhythm: Normal rate and regular rhythm. Pulses: Normal pulses. Heart sounds: No murmur. No gallop. Pulmonary:      Effort: Pulmonary effort is normal. No respiratory distress. Breath sounds: Examination of the right-upper field reveals wheezing. Examination of the left-upper field reveals wheezing. Wheezing present. No rhonchi or rales. Abdominal:      General: Bowel sounds are normal. There is no distension. Palpations: Abdomen is soft. There is no mass. Tenderness: There is no tenderness. There is no right CVA tenderness, left CVA tenderness, guarding or rebound. Skin:     General: Skin is warm and dry. Capillary Refill: Capillary refill takes less than 2 seconds. Neurological:      General: No focal deficit present. Mental Status: She is alert and oriented to person, place, and time. Mental status is at baseline. Cranial Nerves: No cranial nerve deficit. Sensory: No sensory deficit. Motor: No weakness.            Diagnostic Study Results     Labs -  Recent Results (from the past 12 hour(s))   URINALYSIS W/ RFLX MICROSCOPIC    Collection Time: 01/14/20  3:15 PM   Result Value Ref Range    Color YELLOW      Appearance CLEAR      Specific gravity 1.028 1.005 - 1.030      pH (UA) 5.5 5.0 - 8.0      Protein NEGATIVE  NEG mg/dL    Glucose >1,000 (A) NEG mg/dL    Ketone NEGATIVE  NEG mg/dL    Bilirubin NEGATIVE  NEG      Blood NEGATIVE  NEG      Urobilinogen 0.2 0.2 - 1.0 EU/dL    Nitrites NEGATIVE  NEG      Leukocyte Esterase NEGATIVE  NEG     CBC WITH AUTOMATED DIFF Collection Time: 01/14/20  3:41 PM   Result Value Ref Range    WBC 7.4 4.6 - 13.2 K/uL    RBC 5.25 4.20 - 5.30 M/uL    HGB 14.4 12.0 - 16.0 g/dL    HCT 45.3 (H) 35.0 - 45.0 %    MCV 86.3 74.0 - 97.0 FL    MCH 27.4 24.0 - 34.0 PG    MCHC 31.8 31.0 - 37.0 g/dL    RDW 14.9 (H) 11.6 - 14.5 %    PLATELET 533 468 - 119 K/uL    MPV 10.7 9.2 - 11.8 FL    NEUTROPHILS 72 40 - 73 %    LYMPHOCYTES 21 21 - 52 %    MONOCYTES 5 3 - 10 %    EOSINOPHILS 2 0 - 5 %    BASOPHILS 0 0 - 2 %    ABS. NEUTROPHILS 5.3 1.8 - 8.0 K/UL    ABS. LYMPHOCYTES 1.6 0.9 - 3.6 K/UL    ABS. MONOCYTES 0.4 0.05 - 1.2 K/UL    ABS. EOSINOPHILS 0.1 0.0 - 0.4 K/UL    ABS. BASOPHILS 0.0 0.0 - 0.1 K/UL    DF AUTOMATED     METABOLIC PANEL, COMPREHENSIVE    Collection Time: 01/14/20  3:41 PM   Result Value Ref Range    Sodium 136 136 - 145 mmol/L    Potassium 3.6 3.5 - 5.5 mmol/L    Chloride 101 100 - 111 mmol/L    CO2 29 21 - 32 mmol/L    Anion gap 6 3.0 - 18 mmol/L    Glucose 260 (H) 74 - 99 mg/dL    BUN 16 7.0 - 18 MG/DL    Creatinine 1.39 (H) 0.6 - 1.3 MG/DL    BUN/Creatinine ratio 12 12 - 20      GFR est AA 47 (L) >60 ml/min/1.73m2    GFR est non-AA 39 (L) >60 ml/min/1.73m2    Calcium 9.7 8.5 - 10.1 MG/DL    Bilirubin, total 0.3 0.2 - 1.0 MG/DL    ALT (SGPT) 22 13 - 56 U/L    AST (SGOT) 9 (L) 10 - 38 U/L    Alk. phosphatase 168 (H) 45 - 117 U/L    Protein, total 8.3 (H) 6.4 - 8.2 g/dL    Albumin 3.8 3.4 - 5.0 g/dL    Globulin 4.5 (H) 2.0 - 4.0 g/dL    A-G Ratio 0.8 0.8 - 1.7     LIPASE    Collection Time: 01/14/20  3:41 PM   Result Value Ref Range    Lipase 169 73 - 393 U/L       Radiologic Studies -   XR ABD FLAT/ ERECT   Final Result   IMPRESSION:      Nonobstructive bowel gas pattern. Stool throughout the colon. XR CHEST PA LAT   Final Result   IMPRESSION:      No acute radiographic cardiopulmonary abnormality. Medical Decision Making   I am the first provider for this patient.     I reviewed the vital signs, available nursing notes, past medical history, past surgical history, family history and social history. Vital Signs-Reviewed the patient's vital signs. Records Reviewed: Nursing Notes and Old Medical Records (Time of Review: 3:28 PM)    ED Course: Progress Notes, Reevaluation, and Consults:  3:28 PM  Met with patient, reviewed history, performed physical exam. Will check CBC, CMP, Lipase, UA, and CXR.     4:59 PM CBC is unremarkable for any leukocytosis or left shift. CMP shows mild worsening of creatinine. Lipase is unremarkable. UA is negative for UTI. CXR negative. KUB obtained which shows a nonobstructive bowel gas patter. Patient reports pain is similar to previous visit approximately one week ago. Provider Notes (Medical Decision Making):   61year old female seen in the ED for complaints of cough and abdominal pain. As above, CBC is unremarkable. CMP shows mild worsening of creatinine. Lipase normal. UA negative. CXR negative. KUB shows nonobstructive bowel gas pattern. Patient has received 2 breathing treatments in the ED and reports improvement in her symptoms. Patient advised to follow up with PCP as soon as possible. Patient advised to return to the ED immediately if symptoms worsen. Diagnosis     Clinical Impression:   1. Mild persistent asthma with acute exacerbation        Disposition: home     Follow-up Information     Follow up With Specialties Details Why Contact Info    Darletta Primrose, MD Family Practice, Internal Medicine Call in 1 day For follow up regarding ER visit. [id number] [facility] EMERGENCY DEPT Emergency Medicine  Immediately if symptoms worsen. [address]  [phone]           Patient's Medications   Start Taking    No medications on file   Continue Taking    ALBUTEROL SULFATE (PROAIR RESPICLICK) 90 MCG/ACTUATION AEPB    Take 2 Puffs by inhalation every four (4) hours as needed for Wheezing, Shortness of Breath or Cough. ALLERGY RELIEF, CETIRIZINE, 10 MG TABLET    take 1 tablet by mouth once daily    AMLODIPINE (NORVASC) 5 MG TABLET    Take 1 Tab by mouth daily. BISOPROLOL-HYDROCHLOROTHIAZIDE (ZIAC) 2.5-6.25 MG PER TABLET    Take 1 Tab by mouth daily. BUDESONIDE-FORMOTEROL (SYMBICORT) 160-4.5 MCG/ACTUATION HFAA    Take 2 Puffs by inhalation two (2) times a day. CYCLOBENZAPRINE (FLEXERIL) 10 MG TABLET    Take 1 Tab by mouth three (3) times daily as needed for Muscle Spasm(s). DEXAMETHASONE (DECADRON) 2 MG TABLET    Take 10 mg on the day after tomorrow    DEXTROMETHORPHAN-GUAIFENESIN (MUCINEX DM) 60-1,200 MG TB12    Take 1 Tab by mouth every twelve (12) hours as needed for Cough. DOXEPIN (SINEQUAN) 25 MG CAPSULE    Take 1 Cap by mouth nightly. FARXIGA 10 MG TAB TABLET    take 1 tablet by mouth once daily    FOLIC ACID (FOLVITE) 1 MG TABLET    Take  by mouth daily. GLUCOSE BLOOD VI TEST STRIPS (ONETOUCH ULTRA TEST) STRIP    Check blood glucose as directed    METFORMIN (GLUMETZA ER) 500 MG TG24 24 HOUR TABLET    Take 1,000 mg by mouth two (2) times a day. take 1 tablet by mouth twice a day    METHOTREXATE (RHEUMATREX) 2.5 MG TABLET    Take 7.5 mg by mouth every Tuesday. MULTIVITAMIN (ONE A DAY) TABLET    Take 1 Tab by mouth daily. OMEPRAZOLE (PRILOSEC) 10 MG CAPSULE    take 1 capsule by mouth once daily    ONDANSETRON (ZOFRAN ODT) 4 MG DISINTEGRATING TABLET    Take 1-2 tablets every 6-8 hours as needed for nausea and vomiting. POLYETHYLENE GLYCOL (MIRALAX) 17 GRAM/DOSE POWDER    Take 17 g by mouth daily. 1 tablespoon with 8 oz of water daily    SULFASALAZINE (AZULFIDINE) 500 MG TABLET    Take 500 mg by mouth two (2) times a day. These Medications have changed    No medications on file   Stop Taking    No medications on file     Chaz Madera PA-C    Dictation disclaimer:  Please note that this dictation was completed with [employer], the [employer] voice recognition software.   Quite often unanticipated grammatical, syntax, homophones, and other interpretive errors are inadvertently transcribed by the computer software. Please disregard these errors. Please excuse any errors that have escaped final proofreading.

## 2020-01-14 NOTE — DISCHARGE INSTRUCTIONS
Patient Education        Learning About Asthma  What is asthma? Asthma is a long-term condition that affects your breathing. It causes the airways that lead to the lungs to swell. People with asthma may have asthma attacks. During an asthma attack, the airways tighten and become narrower. This makes it hard to breathe, and you may wheeze or cough. If you have a bad asthma attack, you may need emergency care. Asthma affects people in different ways. Some people only have asthma attacks during allergy season, or when they breathe in cold air, or when they exercise. Others have many bad attacks that send them to the doctor often. What are the symptoms? Symptoms of asthma can be mild or severe. You may have mild attacks now and then, you may have severe symptoms every day, or you may have something in between. How often you have symptoms can also change. When you have asthma, you may:  · Wheeze, making a loud or soft whistling noise when you breathe in and out. · Cough a lot. · Feel tightness in your chest.  · Feel short of breath. · Have trouble sleeping because of coughing or having a hard time breathing. · Get tired quickly during exercise. Your symptoms may be worse at night. How can you prevent asthma attacks? Certain things can make asthma symptoms worse. These are called triggers. When you are around a trigger, an asthma attack is more likely. Common triggers include:  · Cigarette smoke or air pollution. · Things you are allergic to, such as:  ? Pollen, mold, or dust mites. ? Pet hair, skin, or saliva. · Illnesses, like colds, flu, or pneumonia. · Exercise. · Dry, cold air. Here are some ways to avoid a few common triggers:  · Do not smoke or allow others to smoke around you. If you need help quitting, talk to your doctor about stop-smoking programs and medicines. These can increase your chances of quitting for good.   · If there is a lot of pollution, pollen, or dust outside, stay at home and keep your windows closed. Use an air conditioner or air filter in your home. Check your local weather report or newspaper for air quality and pollen reports. · Get the flu vaccine every year. Talk to your doctor about getting a pneumococcal shot. Wash your hands often to prevent infections. · Avoid exercising outdoors in cold weather. If you are outdoors in cold weather, wear a scarf around your face and breathe through your nose. How is asthma treated? There are two parts to treating asthma, which are outlined in your asthma action plan. The goals are to:  · Control asthma over the long term. The asthma action plan tells you which medicine you may need to take every day. This is called a controller medicine. It helps to reduce the swelling of the airways and prevent asthma attacks. · Treat asthma attacks when they occur. The asthma action plan tells you what to do when you have an asthma attack. It helps you identify triggers that can cause your attacks. You use quick-relief medicine during an attack. The asthma plan also helps you track your symptoms and know how well the treatment is working. Follow-up care is a key part of your treatment and safety. Be sure to make and go to all appointments, and call your doctor if you are having problems. It's also a good idea to know your test results and keep a list of the medicines you take. Where can you learn more? Go to http://betzy-didi.info/. Enter 9087 [id number] in the search box to learn more about \"Learning About Asthma. \"  Current as of: June 9, 2019  Content Version: 12.2  © 6319-9249 [employer]. Care instructions adapted under license by [employer] (which disclaims liability or warranty for this information). If you have questions about a medical condition or this instruction, always ask your healthcare professional. [address] any warranty or liability for your use of this information.

## 2020-01-17 ENCOUNTER — TELEPHONE (OUTPATIENT)
Dept: FAMILY MEDICINE CLINIC | Age: 60
End: 2020-01-17

## 2020-01-17 NOTE — TELEPHONE ENCOUNTER
Pt's mother called in and informed me that the patient had been in the hospital on the 14th. She was just released and needs to be seen on Monday or Tuesday by Dr. Ernie Hinton. Please advise.

## 2020-01-29 ENCOUNTER — OFFICE VISIT (OUTPATIENT)
Dept: FAMILY MEDICINE CLINIC | Age: 60
End: 2020-01-29

## 2020-01-29 VITALS
TEMPERATURE: 97 F | BODY MASS INDEX: 26.94 KG/M2 | WEIGHT: 157.8 LBS | OXYGEN SATURATION: 98 % | RESPIRATION RATE: 18 BRPM | HEIGHT: 64 IN | SYSTOLIC BLOOD PRESSURE: 138 MMHG | DIASTOLIC BLOOD PRESSURE: 94 MMHG | HEART RATE: 86 BPM

## 2020-01-29 DIAGNOSIS — I10 ESSENTIAL HYPERTENSION, BENIGN: ICD-10-CM

## 2020-01-29 DIAGNOSIS — Z87.19 HISTORY OF SMALL BOWEL OBSTRUCTION: ICD-10-CM

## 2020-01-29 DIAGNOSIS — E09.9 STEROID-INDUCED DIABETES (HCC): ICD-10-CM

## 2020-01-29 DIAGNOSIS — J11.1 INFLUENZA: Primary | ICD-10-CM

## 2020-01-29 DIAGNOSIS — T38.0X5A STEROID-INDUCED DIABETES (HCC): ICD-10-CM

## 2020-01-29 DIAGNOSIS — J45.40 MODERATE PERSISTENT ASTHMA WITHOUT COMPLICATION: ICD-10-CM

## 2020-01-29 DIAGNOSIS — E11.65 TYPE 2 DIABETES MELLITUS WITH HYPERGLYCEMIA, WITHOUT LONG-TERM CURRENT USE OF INSULIN (HCC): ICD-10-CM

## 2020-01-29 NOTE — PROGRESS NOTES
1. Have you been to the ER, urgent care clinic since your last visit? Hospitalized since your last visit? Yes. Patient seen  In ED at  [facility].     2. Have you seen or consulted any other health care providers outside of the [address] [place] [address] since your last visit? Include any pap smears or colon   screening. No     Patient presents today for complaint of cough and abdominal pain. Was seen in ED at [facility] a few days ago.

## 2020-01-29 NOTE — LETTER
NOTIFICATION RETURN TO WORK / SCHOOL 
 
1/29/2020 10:27 AM 
 
Ms. Iris Cardenas [address] [facility] [address] [phone] To Whom It May Concern: 
 
Iris Cardenas is currently under the care of Diana Carpio. She will return to work/school full duty with no restrictions on: 02/05/2020 If there are questions or concerns please have the patient contact our office. Sincerely, Kandice Leventhal, MD

## 2020-01-29 NOTE — PROGRESS NOTES
Katelin Sykes is a 61 y.o.  female and presents with    Chief Complaint   Patient presents with   [facility] Follow Up    Cough    Abdominal Pain     1/15/2020 admitted to [facility] for suspected flu and small bowel obstruction  1/21/2020 discharged to home. Subjective:  Mrs. Cornelia Estrada presents for flu-like symptoms over the past several weeks. She has had cough and congestion and weakness; She was admitted for observation and for small bowel obstruction. She did not have an NG tube inserted; she had stool softener and passed stool. Diabetes Mellitus:  She has diabetes mellitus, and  Hyperlipidemia. Her fasting blood glucose today was 113  Diabetic ROS - medication compliance: compliant most of the time, diabetic diet compliance: noncompliant some of the time. Lab review: labs reviewed, I note that glycosylated hemoglobin abnormal high. Depression Review:  Patient is seen for followup of depression. Treatment includes none and no other therapies. Ongoing symptoms include depressed mood, anhedonia, insomnia, fatigue, feelings of worthlessness/guilt, difficulty concentrating and hopelessness. She denies hypersomnia, impaired memory and recurrent thoughts of death.   She experiences the following side effects from the treatment: none. Anxiety Review:  Patient is seen for sleep disturbance. Current treatment includes doxepin and no other therapies. Ongoing symptoms include: insomnia. Patient denies: palpitations, sweating, chest pain, shortness of breath, dizziness, paresthesias, racing thoughts, psychomotor agitation, feelings of losing control, difficulty concentrating, suicidal ideation. Reported side effects from the treatment: none.     ROS   Constitutional: Negative for fever. HENT: Negative for sore throat.    Eyes: Negative for visual disturbance. Respiratory: Negative for shortness of breath.    Cardiovascular: Negative for chest pain.    Gastrointestinal: Positive for abdominal pain. Endocrine: Negative for polyuria. Genitourinary: Positive for frequency. Negative for difficulty urinating and hematuria. Musculoskeletal: Positive for back pain. Negative for gait problem. Skin: wound site healing  Allergic/Immunologic: Negative for immunocompromised state. Neurological: Negative for syncope. Psychiatric/Behavioral: Positive for sleep disturbance.   All other systems reviewed and are negative.            Objective:  Vitals:    01/29/20 0929 01/29/20 0932   BP: (!) 132/94 (!) 138/94   Pulse: 86    Resp: 18    Temp: 97 °F (36.1 °C)    TempSrc: Oral    SpO2: 98%    Weight: 157 lb 12.8 oz (71.6 kg)    Height: 5' 4\" (1.626 m)    PainSc:   7   7   PainLoc: Abdomen Abdomen       alert, well appearing, and in no distress, oriented to person, place, and time and normal appearing weight  Mental status - anxious  Chest - clear to auscultation, no wheezes, rales or rhonchi, symmetric air entry  Heart - normal rate, regular rhythm, normal S1, S2, no murmurs, rubs, clicks or gallops  Abdomen - soft, nondistended, no masses or organomegaly  tenderness noted diffuse  Extremities - peripheral pulses normal, no pedal edema, no clubbing or cyanosis    Diabetic foot exam:     Left Foot:   Visual Exam: normal    Pulse DP: 2+ (normal)   Filament test: reduced sensation        Right Foot:   Visual Exam: normal    Pulse DP: 2+ (normal)   Filament test: reduced sensation      LABS   hgb a1c 8.1    Assessment/Plan:    1. History of small bowel obstruction  Continue with bland diet; precautions reviewed for signs of recurrence    2. Influenza  Continue increased hydration, alternate tylenol and motrin for pain; return to full activity as tolerated    3. Type 2 diabetes mellitus with hyperglycemia, without long-term current use of insulin (HCC)  Goal hgb a1c <7    4. Moderate persistent asthma without complication  Inhaled therpy    5. Essential hypertension, benign  Goal <130/80    6. Steroid-induced diabetes ([facility] [address]  Limit use      Lab review: labs reviewed, I note that hemogram normal, basic metabolic panel normal      I have discussed the diagnosis with the patient and the intended plan as seen in the above orders. The patient has received an after-visit summary and questions were answered concerning future plans. I have discussed medication side effects and warnings with the patient as well. I have reviewed the plan of care with the patient, accepted their input and they are in agreement with the treatment goals.

## 2020-01-29 NOTE — LETTER
NOTIFICATION RETURN TO WORK / SCHOOL 
 
1/29/2020 10:20 AM 
 
Ms. Ev Rowley [address] [facility] [address] [phone] To Whom It May Concern: 
 
[facility]lye [facility] She will return to work/school on: 01/30/2020 If there are questions or concerns please have the patient contact our office. Sincerely, Trudy [facility] MD

## 2020-02-03 ENCOUNTER — TELEPHONE (OUTPATIENT)
Dept: FAMILY MEDICINE CLINIC | Age: 60
End: 2020-02-03

## 2020-02-03 NOTE — TELEPHONE ENCOUNTER
Contacted the patient and advised that the letter was ready for pickup. Letter has been placed in prescription bin. Encounter closed.

## 2020-02-03 NOTE — TELEPHONE ENCOUNTER
Patient called in requesting a note for Scott that she has been under our providers care since 1/15/2020 until 02/05/2020. She stated that the letter must be signed by her provider. Please assit.

## 2020-02-11 NOTE — TELEPHONE ENCOUNTER
Requested Prescriptions     Pending Prescriptions Disp Refills    albuterol sulfate (PROAIR RESPICLICK) 90 mcg/actuation aepb 1 Inhaler 0     Sig: Take 2 Puffs by inhalation every four (4) hours as needed for Wheezing, Shortness of Breath or Cough. Patient also needs refill of: Ipratropoim bromide 0.02% soln. Patient received from [place] to [facility] for awhile, patient needs another refill sent over because [facility] refuses to send it. Please advise, thank you.

## 2020-02-12 ENCOUNTER — TELEPHONE (OUTPATIENT)
Dept: FAMILY MEDICINE CLINIC | Age: 60
End: 2020-02-12

## 2020-02-13 DIAGNOSIS — J45.40 MODERATE PERSISTENT ASTHMA WITHOUT COMPLICATION: Primary | ICD-10-CM

## 2020-02-13 DIAGNOSIS — I10 ESSENTIAL HYPERTENSION, BENIGN: ICD-10-CM

## 2020-02-13 RX ORDER — AMLODIPINE BESYLATE 5 MG/1
TABLET ORAL
Qty: 30 TAB | Refills: 0 | OUTPATIENT
Start: 2020-02-13

## 2020-02-13 RX ORDER — ALBUTEROL SULFATE 0.83 MG/ML
2.5 SOLUTION RESPIRATORY (INHALATION)
Qty: 30 NEBULE | Refills: 1 | Status: SHIPPED | OUTPATIENT
Start: 2020-02-13 | End: 2021-01-27 | Stop reason: SDUPTHER

## 2020-02-17 ENCOUNTER — TELEPHONE (OUTPATIENT)
Dept: FAMILY MEDICINE CLINIC | Age: 60
End: 2020-02-17

## 2020-02-17 DIAGNOSIS — J45.40 MODERATE PERSISTENT ASTHMA WITHOUT COMPLICATION: Primary | ICD-10-CM

## 2020-02-17 RX ORDER — IPRATROPIUM BROMIDE 0.5 MG/2.5ML
0.5 SOLUTION RESPIRATORY (INHALATION)
Qty: 30 VIAL | Refills: 1 | Status: ON HOLD | OUTPATIENT
Start: 2020-02-17 | End: 2020-09-12

## 2020-02-17 NOTE — TELEPHONE ENCOUNTER
Pt needs a refill on her Ipratropium bromide solution 0.2% 0.5mg/2.5 mL vial. She stated the message was supposed to have been ut in last week but there was no encounter that I saw. She spelled out the medication name and stated it's a nebulizer solution for her breathing treatment and she needs it ASAP. I did not see this exact medication in her med list but told her I would relay message to nurse. Please advise.

## 2020-02-27 ENCOUNTER — OFFICE VISIT (OUTPATIENT)
Dept: FAMILY MEDICINE CLINIC | Age: 60
End: 2020-02-27

## 2020-02-27 VITALS
BODY MASS INDEX: 26.63 KG/M2 | TEMPERATURE: 98.6 F | OXYGEN SATURATION: 99 % | HEART RATE: 88 BPM | SYSTOLIC BLOOD PRESSURE: 133 MMHG | WEIGHT: 156 LBS | HEIGHT: 64 IN | RESPIRATION RATE: 16 BRPM | DIASTOLIC BLOOD PRESSURE: 87 MMHG

## 2020-02-27 DIAGNOSIS — I10 ESSENTIAL HYPERTENSION, BENIGN: ICD-10-CM

## 2020-02-27 DIAGNOSIS — E11.65 TYPE 2 DIABETES MELLITUS WITH HYPERGLYCEMIA, WITHOUT LONG-TERM CURRENT USE OF INSULIN (HCC): ICD-10-CM

## 2020-02-27 DIAGNOSIS — K43.2 INCISIONAL HERNIA, WITHOUT OBSTRUCTION OR GANGRENE: ICD-10-CM

## 2020-02-27 DIAGNOSIS — T38.0X5A STEROID-INDUCED DIABETES (HCC): ICD-10-CM

## 2020-02-27 DIAGNOSIS — E09.9 STEROID-INDUCED DIABETES (HCC): ICD-10-CM

## 2020-02-27 DIAGNOSIS — R81 GLUCOSURIA: ICD-10-CM

## 2020-02-27 DIAGNOSIS — R10.2 PELVIC PAIN: Primary | ICD-10-CM

## 2020-02-27 DIAGNOSIS — J45.40 MODERATE PERSISTENT ASTHMA WITHOUT COMPLICATION: ICD-10-CM

## 2020-02-27 LAB
BILIRUB UR QL STRIP: NEGATIVE
GLUCOSE UR-MCNC: NORMAL MG/DL
KETONES P FAST UR STRIP-MCNC: NEGATIVE MG/DL
PH UR STRIP: 5 [PH] (ref 4.6–8)
PROT UR QL STRIP: NEGATIVE
SP GR UR STRIP: 1.02 (ref 1–1.03)
UA UROBILINOGEN AMB POC: NORMAL (ref 0.2–1)
URINALYSIS CLARITY POC: CLEAR
URINALYSIS COLOR POC: YELLOW
URINE BLOOD POC: NEGATIVE
URINE LEUKOCYTES POC: NEGATIVE
URINE NITRITES POC: NEGATIVE

## 2020-02-27 RX ORDER — METFORMIN HYDROCHLORIDE 500 MG/1
2 TABLET, FILM COATED, EXTENDED RELEASE ORAL 2 TIMES DAILY
Qty: 120 TAB | Refills: 11 | Status: SHIPPED | OUTPATIENT
Start: 2020-02-27 | End: 2020-07-31 | Stop reason: SDUPTHER

## 2020-02-27 RX ORDER — AMLODIPINE BESYLATE 5 MG/1
5 TABLET ORAL DAILY
Qty: 30 TAB | Refills: 0 | Status: SHIPPED | OUTPATIENT
Start: 2020-02-27 | End: 2021-01-27 | Stop reason: SDUPTHER

## 2020-02-27 NOTE — PROGRESS NOTES
Patricia Trujillo is a 61 y.o.  female and presents with    Chief Complaint   Patient presents with    Pelvic Pain     Subjective:  Abdominal Pain  Mrs. Damian complains of abdominal pain. The pain is described as cramping and sharp, and is 9/10 in intensity. Pain is located in the suprapubic without radiation. Onset was 1 day ago. Symptoms have been rapidly worsening since. Aggravating factors: movement. Alleviating factors: resting. Associated symptoms: none. The patient denies anorexia, belching, chills, constipation, diarrhea, dysuria, fever, flatus and headache. Diabetes Mellitus:  She has diabetes mellitus, and  Hyperlipidemia. Diabetic ROS - medication compliance: compliant most of the time, diabetic diet compliance: noncompliant some of the time. Lab review: labs reviewed, I note that glycosylated hemoglobin abnormal high.      Depression Review:  Patient is seen for followup of depression. Treatment includes none and no other therapies. Ongoing symptoms include depressed mood, anhedonia, insomnia, fatigue, feelings of worthlessness/guilt, difficulty concentrating and hopelessness. She denies hypersomnia, impaired memory and recurrent thoughts of death.   She experiences the following side effects from the treatment: none. Anxiety Review:  Patient is seen for sleep disturbance. Current treatment includes doxepin and no other therapies. Ongoing symptoms include: insomnia. Patient denies: palpitations, sweating, chest pain, shortness of breath, dizziness, paresthesias, racing thoughts, psychomotor agitation, feelings of losing control, difficulty concentrating, suicidal ideation. Reported side effects from the treatment: none.     ROS   Constitutional: Negative for fever. HENT: Negative for sore throat.    Eyes: Negative for visual disturbance. Respiratory: Negative for shortness of breath.    Cardiovascular: Negative for chest pain.    Gastrointestinal: Positive for abdominal pain. Endocrine: Negative for polyuria. Genitourinary: Positive for frequency. Negative for difficulty urinating and hematuria. Musculoskeletal: Positive for back pain. Negative for gait problem. Skin: wound site healing  Allergic/Immunologic: Negative for immunocompromised state. Neurological: Negative for syncope. Psychiatric/Behavioral: Positive for sleep disturbance.   All other systems reviewed and are negative. Objective:  Vitals:    02/27/20 1306   BP: 133/87   Pulse: 88   Resp: 16   Temp: 98.6 °F (37 °C)   TempSrc: Oral   SpO2: 99%   Weight: 156 lb (70.8 kg)   Height: 5' 4\" (1.626 m)   PainSc:   9   PainLoc: Pelvic     alert, well appearing, and in no distress, oriented to person, place, and time and normal appearing weight  Mental status - anxious  Chest - clear to auscultation, no wheezes, rales or rhonchi, symmetric air entry  Heart - normal rate, regular rhythm, normal S1, S2, no murmurs, rubs, clicks or gallops  Abdomen - ttp surgical scar with bulge  tenderness noted diffuse  Extremities - peripheral pulses normal, no pedal edema, no clubbing or cyanosis     LABS   Urinalysis - 2+ glucose      Assessment/Plan:    1. Pelvic pain  Incisional hernia  - AMB POC URINALYSIS DIP STICK AUTO W/O MICRO    2. Incisional hernia, without obstruction or gangrene  Imaging to assess size  - [facility]; Future    3. Type 2 diabetes mellitus with hyperglycemia, without long-term current use of insulin ([facility])  Goal hgb a1c <7    4. Glucosuria  Control diabetes    5. Moderate persistent asthma without complication  Continue inhaled therapy    6. Steroid-induced diabetes ([address]    - metFORMIN (GLUMETZA ER) 500 mg TG24 24 hour tablet; Take 1,000 mg by mouth two (2) times a day. take 1 tablet by mouth twice a day  Dispense: 120 Tab; Refill: 11    7. Essential hypertension, benign  Goal <130/80  - amLODIPine (NORVASC) 5 mg tablet; Take 1 Tab by mouth daily. Dispense: 30 Tab;  Refill: 0      Lab review: labs reviewed, I note that urinalysis abnormal with glucose      I have discussed the diagnosis with the patient and the intended plan as seen in the above orders. The patient has received an after-visit summary and questions were answered concerning future plans. I have discussed medication side effects and warnings with the patient as well. I have reviewed the plan of care with the patient, accepted their input and they are in agreement with the treatment goals.

## 2020-03-02 ENCOUNTER — APPOINTMENT (OUTPATIENT)
Dept: CT IMAGING | Age: 60
End: 2020-03-02
Attending: EMERGENCY MEDICINE
Payer: COMMERCIAL

## 2020-03-02 ENCOUNTER — HOSPITAL ENCOUNTER (EMERGENCY)
Age: 60
Discharge: HOME | End: 2020-03-02
Attending: EMERGENCY MEDICINE | Admitting: EMERGENCY MEDICINE
Payer: COMMERCIAL

## 2020-03-02 VITALS
HEIGHT: 64 IN | TEMPERATURE: 98.5 F | SYSTOLIC BLOOD PRESSURE: 122 MMHG | OXYGEN SATURATION: 98 % | WEIGHT: 152 LBS | HEART RATE: 80 BPM | DIASTOLIC BLOOD PRESSURE: 80 MMHG | BODY MASS INDEX: 25.95 KG/M2 | RESPIRATION RATE: 18 BRPM

## 2020-03-02 DIAGNOSIS — R19.7 DIARRHEA, UNSPECIFIED TYPE: ICD-10-CM

## 2020-03-02 DIAGNOSIS — R10.31 ABDOMINAL PAIN, RIGHT LOWER QUADRANT: Primary | ICD-10-CM

## 2020-03-02 LAB
ALBUMIN SERPL-MCNC: 3.7 G/DL (ref 3.4–5)
ALBUMIN/GLOB SERPL: 0.9 {RATIO} (ref 0.8–1.7)
ALP SERPL-CCNC: 137 U/L (ref 45–117)
ALT SERPL-CCNC: 32 U/L (ref 13–56)
ANION GAP SERPL CALC-SCNC: 5 MMOL/L (ref 3–18)
APPEARANCE UR: CLEAR
AST SERPL-CCNC: 41 U/L (ref 10–38)
BASOPHILS # BLD: 0 K/UL (ref 0–0.1)
BASOPHILS NFR BLD: 0 % (ref 0–2)
BILIRUB SERPL-MCNC: 0.5 MG/DL (ref 0.2–1)
BILIRUB UR QL: NEGATIVE
BUN SERPL-MCNC: 21 MG/DL (ref 7–18)
BUN/CREAT SERPL: 19 (ref 12–20)
CALCIUM SERPL-MCNC: 9.4 MG/DL (ref 8.5–10.1)
CHLORIDE SERPL-SCNC: 104 MMOL/L (ref 100–111)
CO2 SERPL-SCNC: 28 MMOL/L (ref 21–32)
COLOR UR: YELLOW
CREAT SERPL-MCNC: 1.13 MG/DL (ref 0.6–1.3)
DIFFERENTIAL METHOD BLD: ABNORMAL
EOSINOPHIL # BLD: 0.1 K/UL (ref 0–0.4)
EOSINOPHIL NFR BLD: 1 % (ref 0–5)
ERYTHROCYTE [DISTWIDTH] IN BLOOD BY AUTOMATED COUNT: 15.1 % (ref 11.6–14.5)
GLOBULIN SER CALC-MCNC: 4 G/DL (ref 2–4)
GLUCOSE SERPL-MCNC: 104 MG/DL (ref 74–99)
GLUCOSE UR STRIP.AUTO-MCNC: >1000 MG/DL
HCT VFR BLD AUTO: 42.9 % (ref 35–45)
HGB BLD-MCNC: 13.7 G/DL (ref 12–16)
HGB UR QL STRIP: NEGATIVE
KETONES UR QL STRIP.AUTO: ABNORMAL MG/DL
LEUKOCYTE ESTERASE UR QL STRIP.AUTO: NEGATIVE
LIPASE SERPL-CCNC: 122 U/L (ref 73–393)
LYMPHOCYTES # BLD: 2.2 K/UL (ref 0.9–3.6)
LYMPHOCYTES NFR BLD: 28 % (ref 21–52)
MCH RBC QN AUTO: 27 PG (ref 24–34)
MCHC RBC AUTO-ENTMCNC: 31.9 G/DL (ref 31–37)
MCV RBC AUTO: 84.6 FL (ref 74–97)
MONOCYTES # BLD: 0.5 K/UL (ref 0.05–1.2)
MONOCYTES NFR BLD: 7 % (ref 3–10)
NEUTS SEG # BLD: 4.9 K/UL (ref 1.8–8)
NEUTS SEG NFR BLD: 64 % (ref 40–73)
NITRITE UR QL STRIP.AUTO: NEGATIVE
PH UR STRIP: 5.5 [PH] (ref 5–8)
PLATELET # BLD AUTO: 348 K/UL (ref 135–420)
PMV BLD AUTO: 10.8 FL (ref 9.2–11.8)
POTASSIUM SERPL-SCNC: 4.7 MMOL/L (ref 3.5–5.5)
PROT SERPL-MCNC: 7.7 G/DL (ref 6.4–8.2)
PROT UR STRIP-MCNC: NEGATIVE MG/DL
RBC # BLD AUTO: 5.07 M/UL (ref 4.2–5.3)
SODIUM SERPL-SCNC: 137 MMOL/L (ref 136–145)
SP GR UR REFRACTOMETRY: 1.03 (ref 1–1.03)
UROBILINOGEN UR QL STRIP.AUTO: 0.2 EU/DL (ref 0.2–1)
WBC # BLD AUTO: 7.7 K/UL (ref 4.6–13.2)

## 2020-03-02 PROCEDURE — 74177 CT ABD & PELVIS W/CONTRAST: CPT

## 2020-03-02 PROCEDURE — 85025 COMPLETE CBC W/AUTO DIFF WBC: CPT

## 2020-03-02 PROCEDURE — 96361 HYDRATE IV INFUSION ADD-ON: CPT

## 2020-03-02 PROCEDURE — 87086 URINE CULTURE/COLONY COUNT: CPT

## 2020-03-02 PROCEDURE — 74011636320 HC RX REV CODE- 636/320: Performed by: EMERGENCY MEDICINE

## 2020-03-02 PROCEDURE — 99282 EMERGENCY DEPT VISIT SF MDM: CPT

## 2020-03-02 PROCEDURE — 74011250636 HC RX REV CODE- 250/636: Performed by: EMERGENCY MEDICINE

## 2020-03-02 PROCEDURE — 96374 THER/PROPH/DIAG INJ IV PUSH: CPT

## 2020-03-02 PROCEDURE — 83690 ASSAY OF LIPASE: CPT

## 2020-03-02 PROCEDURE — 81003 URINALYSIS AUTO W/O SCOPE: CPT

## 2020-03-02 PROCEDURE — 80053 COMPREHEN METABOLIC PANEL: CPT

## 2020-03-02 RX ORDER — DICYCLOMINE HYDROCHLORIDE 10 MG/1
10 CAPSULE ORAL 4 TIMES DAILY
Qty: 10 CAP | Refills: 0 | Status: SHIPPED | OUTPATIENT
Start: 2020-03-02 | End: 2020-03-05

## 2020-03-02 RX ORDER — MORPHINE SULFATE 4 MG/ML
4 INJECTION, SOLUTION INTRAMUSCULAR; INTRAVENOUS
Status: COMPLETED | OUTPATIENT
Start: 2020-03-02 | End: 2020-03-02

## 2020-03-02 RX ORDER — ACETAMINOPHEN 325 MG/1
650 TABLET ORAL
Qty: 20 TAB | Refills: 0 | Status: SHIPPED | OUTPATIENT
Start: 2020-03-02 | End: 2020-11-10 | Stop reason: ALTCHOICE

## 2020-03-02 RX ADMIN — SODIUM CHLORIDE 1000 ML: 900 INJECTION, SOLUTION INTRAVENOUS at 18:14

## 2020-03-02 RX ADMIN — IOPAMIDOL 97 ML: 612 INJECTION, SOLUTION INTRAVENOUS at 18:35

## 2020-03-02 RX ADMIN — MORPHINE SULFATE 4 MG: 4 INJECTION, SOLUTION INTRAMUSCULAR; INTRAVENOUS at 18:15

## 2020-03-02 NOTE — ED PROVIDER NOTES
Date: 3/2/2020  Patient Name: Omer Casillas    History of Presenting Illness     No chief complaint on file. History Provided By: Patient      HPI/Chief Complaint: (Context):who presents with 1 day of constant right lower quadrant abdominal pain history of similar pain in the same area intermittent in the past few days patient was supposed to get an outpatient ultrasound patient symptoms are worse in last 24 hours. The pain is become constant  Patient has diarrhea loose stool that is present nonbloody no vomiting no upper abdominal pain no fever no chills no chest pain no shortness of breath no back pain no dysuria  Patient has had abdominal surgery and history of bowel obstruction in the past and adhesions as well and is a complicated history. Patient is pain is worsened than before although she is having flatus and bowel movement. Is no radiation of symptoms  No acute alleviating factors but exacerbated by movement and palpation. --  Patient's triage note is reviewed  Capri Santillan's patient's physician does note review was done at 5:20 PM  Patient with ESR 3  Patient's chief complaint of right lower quadrant abdominal pain 1 day nausea vomiting diarrhea patient's pain scale is 10 out of 10 allergy to nitrofurantoin tape  Home medications include albuterol Norvasc Flexeril Decadron folic acid metformin omeprazole  Patient is medical history includes anemia adhesions abdominally diabetes hypertension GERD chronic pelvic pain surgical history includes hernia repair cholecystectomy oophorectomy small bowel resection knee replacement social history is no smoking no alcohol no drugs  Patient's prior visit show patient with loose stool abdominal pain history of small bowel obstruction CBC CMP      PCP: Blaze Magallanes MD    Current Outpatient Medications   Medication Sig Dispense Refill    metFORMIN (GLUMETZA ER) 500 mg TG24 24 hour tablet Take 1,000 mg by mouth two (2) times a day.  take 1 tablet by mouth twice a day 120 Tab 11    amLODIPine (NORVASC) 5 mg tablet Take 1 Tab by mouth daily. 30 Tab 0    albuterol sulfate (PROAIR RESPICLICK) 90 mcg/actuation aepb Take 2 Puffs by inhalation every four (4) hours as needed for Wheezing, Shortness of Breath or Cough. 1 Inhaler 0    ipratropium (ATROVENT) 0.02 % soln 2.5 mL by Nebulization route every six (6) hours as needed for Cough. 30 Vial 1    albuterol (PROVENTIL VENTOLIN) 2.5 mg /3 mL (0.083 %) nebu 3 mL by Nebulization route every six (6) hours as needed for Wheezing. 30 Nebule 1    ondansetron (ZOFRAN ODT) 4 mg disintegrating tablet Take 1-2 tablets every 6-8 hours as needed for nausea and vomiting. 10 Tab 0    dexAMETHasone (DECADRON) 2 mg tablet Take 10 mg on the day after tomorrow 5 Tab 0    FARXIGA 10 mg tab tablet take 1 tablet by mouth once daily 90 Tab 3    budesonide-formoterol (SYMBICORT) 160-4.5 mcg/actuation HFAA Take 2 Puffs by inhalation two (2) times a day. 1 Inhaler 2    cyclobenzaprine (FLEXERIL) 10 mg tablet Take 1 Tab by mouth three (3) times daily as needed for Muscle Spasm(s). 20 Tab 0    doxepin (SINEQUAN) 25 mg capsule Take 1 Cap by mouth nightly. 30 Cap 2    polyethylene glycol (MIRALAX) 17 gram/dose powder Take 17 g by mouth daily. 1 tablespoon with 8 oz of water daily 595 g 0    multivitamin (ONE A DAY) tablet Take 1 Tab by mouth daily.  ALLERGY RELIEF, CETIRIZINE, 10 mg tablet take 1 tablet by mouth once daily 90 Tab 3    bisoprolol-hydroCHLOROthiazide (ZIAC) 2.5-6.25 mg per tablet Take 1 Tab by mouth daily. 90 Tab 3    omeprazole (PRILOSEC) 10 mg capsule take 1 capsule by mouth once daily 30 Cap 11    folic acid (FOLVITE) 1 mg tablet Take  by mouth daily.  methotrexate (RHEUMATREX) 2.5 mg tablet Take 7.5 mg by mouth every Tuesday.  sulfaSALAzine (AZULFIDINE) 500 mg tablet Take 500 mg by mouth two (2) times a day.       glucose blood VI test strips (ONETOUCH ULTRA TEST) strip Check blood glucose as directed 100 Strip 1       Past History     Past Medical History:  Past Medical History:   Diagnosis Date    Abdominal adhesions     Adnexal cyst 7/30/2019    Left    Anemia     Asthma     Chronic pelvic pain in female 7/30/2019    Diabetes mellitus     Dyskinesia     bilateral    Essential hypertension     GERD (gastroesophageal reflux disease)     Hypertension     Menopause     Microhematuria     Rheumatoid arteritis ([facility] [address] 2018    Stool color black        Past Surgical History:  Past Surgical History:   Procedure Laterality Date    COLONOSCOPY N/A 1/21/2019    COLONOSCOPY performed by Kourt[facility] Garvin [facility] at [facility]    HX CHOLECYSTECTOMY  6/28/10    HX COLONOSCOPY      HX ENDOSCOPY      HX HERNIA REPAIR      HX HYSTERECTOMY  1989    Fibroids    HX KNEE REPLACEMENT Left     HX KNEE REPLACEMENT Right 06/2018    HX OOPHORECTOMY  12/2000    HX ORTHOPAEDIC Right     ankle- multiple surgeries    HX SMALL BOWEL RESECTION  12/2000    HX SMALL BOWEL RESECTION  02/21/2017    Dr. Cyril Christine         Family History:  Family History   Problem Relation Age of Onset    Hypertension Other         parent    Breast Cancer Other 21    Heart Disease Father     Diabetes Father     Lung Disease Father     Diabetes Mother     Hypertension Other         sibling    Diabetes Other         parent    Kidney Disease Brother     Diabetes Brother     Lung Disease Brother        Social History:  Social History     Tobacco Use    Smoking status: Never Smoker    Smokeless tobacco: Never Used   Substance Use Topics    Alcohol use: No    Drug use: No       Allergies:   Allergies   Allergen Reactions    Macrodantin [Nitrofurantoin Macrocrystalline] Itching and Other (comments)    Tape [Adhesive] Other (comments)     Paper tape-- feels like burning skin  Burned skin when had wound vac         Review of Systems   Review of Systems   Constitutional: Negative for activity change, fatigue and fever. HENT: Negative for congestion and rhinorrhea. Eyes: Negative for visual disturbance. Respiratory: Negative for shortness of breath. Cardiovascular: Negative for chest pain and palpitations. Gastrointestinal: Positive for abdominal distention and diarrhea. Negative for abdominal pain, nausea and vomiting. Genitourinary: Negative for dysuria and hematuria. Musculoskeletal: Negative for back pain. Skin: Negative for rash. Neurological: Negative for dizziness, weakness and light-headedness. Psychiatric/Behavioral: Negative for agitation. All other systems reviewed and are negative. Physical Exam     Physical Exam  Vitals signs and nursing note reviewed. Constitutional:       General: She is not in acute distress. Appearance: She is well-developed. HENT:      Head: Normocephalic and atraumatic. Right Ear: External ear normal.      Left Ear: External ear normal.      Nose: Nose normal.   Eyes:      General: No scleral icterus. Conjunctiva/sclera: Conjunctivae normal.      Pupils: Pupils are equal, round, and reactive to light. Neck:      Musculoskeletal: Normal range of motion and neck supple. Thyroid: No thyromegaly. Vascular: No JVD. Trachea: No tracheal deviation. Cardiovascular:      Rate and Rhythm: Normal rate and regular rhythm. Heart sounds: No murmur. No friction rub. Pulmonary:      Effort: Pulmonary effort is normal.      Breath sounds: Normal breath sounds. No stridor. Chest:      Chest wall: No tenderness. Abdominal:      General: Bowel sounds are normal. There is no distension. Palpations: Abdomen is soft. Tenderness: There is abdominal tenderness. There is no guarding or rebound. Comments: Right lower quadrant tenderness no obvious hernia appreciated no midline tenderness mild suprapubic tenderness as well     Musculoskeletal: Normal range of motion. General: No tenderness. Lymphadenopathy:      Cervical: No cervical adenopathy. Skin:     General: Skin is warm and dry. Neurological:      Mental Status: She is alert. Cranial Nerves: No cranial nerve deficit. Coordination: Coordination normal.   Psychiatric:         Behavior: Behavior normal.         Thought Content: Thought content normal.         Judgment: Judgment normal.         Medical Decision Making   I am the first provider for this patient. I reviewed the vital signs, available nursing notes, past medical history, past surgical history, family history and social history. Provider Notes (Medical Decision Making): Patient with right lower quadrant tenderness  Concern for appendicitis versus bowel obstruction versus UTI  Check CT scan abdomen pelvis  Pain management hydration  I will check basic labs as well for this patient although there is no right upper or left upper quadrant tenderness  I will also rule out any incarcerated hernia by performing the CT scan. No pulsatile mass midline appreciated    Vital Signs-Reviewed the patient's vital signs. Pulse Oximetry Analysis -98%, room air,          Vitals:    03/02/20 1346   BP: 122/80   Pulse: 80   Resp: 18   Temp: 98.5 °F (36.9 °C)   SpO2: 98%   Weight: 68.9 kg (152 lb)   Height: 5' 4\" (1.626 m)     Records Reviewed: Nursing Notes    ED Course:      7:00 PM  Patient awake alert no distress no abdominal pain in the emergency department states she is feeling much better I am awaiting her CT scan result patient does not have any incarcerated hernia or bowel infarction or volvulus or obstruction or other cause for patient's pain I will have patient discharged and follow-up. Patient understands all this understands instructions understands to follow-up she already has an appointment for tomorrow.   If any changes or worsen she will return to the emergency department    Diagnostic Study Results     Orders Placed This Encounter    CULTURE, URINE     Standing Status:   Standing     Number of Occurrences:   1     Order Specific Question:   Reason for Culture     Answer: Other    C. DIFFICILE AG & TOXIN A/B     Standing Status:   Standing     Number of Occurrences:   1    CT ABD PELV W CONT     Standing Status:   Standing     Number of Occurrences:   1     Order Specific Question:   Transport     Answer:   Stretcher [5]     Order Specific Question:   Reason for Exam     Answer:   abdominal pain/hernia     Order Specific Question: This order utilizes IV contrast.  What additional contrast is needed? Answer:   None     Order Specific Question:   Does patient have history of Renal Disease? Answer:   No    CBC WITH AUTOMATED DIFF     Standing Status:   Standing     Number of Occurrences:   1    METABOLIC PANEL, COMPREHENSIVE     Standing Status:   Standing     Number of Occurrences:   1    LIPASE     Standing Status:   Standing     Number of Occurrences:   1    URINALYSIS W/ RFLX MICROSCOPIC     Standing Status:   Standing     Number of Occurrences:   1    ENTERIC CONTACT ISOLATION     Standing Status:   Standing     Number of Occurrences:   1    INSERT PERIPHERAL IV ONE TIME STAT     Standing Status:   Standing     Number of Occurrences:   1    sodium chloride 0.9 % bolus infusion 1,000 mL    morphine injection 4 mg    iopamidoL (ISOVUE 300) 61 % contrast injection 100 mL       Labs -     Recent Results (from the past 12 hour(s))   CBC WITH AUTOMATED DIFF    Collection Time: 03/02/20  5:36 PM   Result Value Ref Range    WBC 7.7 4.6 - 13.2 K/uL    RBC 5.07 4.20 - 5.30 M/uL    HGB 13.7 12.0 - 16.0 g/dL    HCT 42.9 35.0 - 45.0 %    MCV 84.6 74.0 - 97.0 FL    MCH 27.0 24.0 - 34.0 PG    MCHC 31.9 31.0 - 37.0 g/dL    RDW 15.1 (H) 11.6 - 14.5 %    PLATELET 296 189 - 463 K/uL    MPV 10.8 9.2 - 11.8 FL    NEUTROPHILS 64 40 - 73 %    LYMPHOCYTES 28 21 - 52 %    MONOCYTES 7 3 - 10 %    EOSINOPHILS 1 0 - 5 %    BASOPHILS 0 0 - 2 %    ABS.  NEUTROPHILS 4.9 1.8 - 8.0 K/UL    ABS. LYMPHOCYTES 2.2 0.9 - 3.6 K/UL    ABS. MONOCYTES 0.5 0.05 - 1.2 K/UL    ABS. EOSINOPHILS 0.1 0.0 - 0.4 K/UL    ABS. BASOPHILS 0.0 0.0 - 0.1 K/UL    DF AUTOMATED     METABOLIC PANEL, COMPREHENSIVE    Collection Time: 03/02/20  5:36 PM   Result Value Ref Range    Sodium 137 136 - 145 mmol/L    Potassium 4.7 3.5 - 5.5 mmol/L    Chloride 104 100 - 111 mmol/L    CO2 28 21 - 32 mmol/L    Anion gap 5 3.0 - 18 mmol/L    Glucose 104 (H) 74 - 99 mg/dL    BUN 21 (H) 7.0 - 18 MG/DL    Creatinine 1.13 0.6 - 1.3 MG/DL    BUN/Creatinine ratio 19 12 - 20      GFR est AA 60 (L) >60 ml/min/1.73m2    GFR est non-AA 49 (L) >60 ml/min/1.73m2    Calcium 9.4 8.5 - 10.1 MG/DL    Bilirubin, total 0.5 0.2 - 1.0 MG/DL    ALT (SGPT) 32 13 - 56 U/L    AST (SGOT) 41 (H) 10 - 38 U/L    Alk. phosphatase 137 (H) 45 - 117 U/L    Protein, total 7.7 6.4 - 8.2 g/dL    Albumin 3.7 3.4 - 5.0 g/dL    Globulin 4.0 2.0 - 4.0 g/dL    A-G Ratio 0.9 0.8 - 1.7     LIPASE    Collection Time: 03/02/20  5:36 PM   Result Value Ref Range    Lipase 122 73 - 393 U/L   URINALYSIS W/ RFLX MICROSCOPIC    Collection Time: 03/02/20  5:59 PM   Result Value Ref Range    Color YELLOW      Appearance CLEAR      Specific gravity 1.028 1.005 - 1.030      pH (UA) 5.5 5.0 - 8.0      Protein NEGATIVE  NEG mg/dL    Glucose >1,000 (A) NEG mg/dL    Ketone TRACE (A) NEG mg/dL    Bilirubin NEGATIVE  NEG      Blood NEGATIVE  NEG      Urobilinogen 0.2 0.2 - 1.0 EU/dL    Nitrites NEGATIVE  NEG      Leukocyte Esterase NEGATIVE  NEG         Radiologic Studies -   CT ABD PELV W CONT    (Results Pending)     CT Results  (Last 48 hours)    None        CXR Results  (Last 48 hours)    None        7:00 PM : Pt care transferred to Dr. Vincent Carrillo  ,ED provider. History of patient complaint(s), available diagnostic reports and current treatment plan has been discussed thoroughly. Intended disposition of patient : Home  Pending diagnostics reports and/or labs (please list):  Check CT scan report. And reevaluate    Discharge     Clinical Impression:   1. Abdominal pain, right lower quadrant    2.  Diarrhea, unspecified type        Disposition:  Home    It should be noted that I will be the provider of record for this patient  Scott Steven MD      Follow-up Information    None         Current Discharge Medication List

## 2020-03-02 NOTE — ED NOTES
Pt aware we need stool sample. Instructed to let us know when she is ready to try so we can put a hat in the toilet for her.

## 2020-03-02 NOTE — ED TRIAGE NOTES
rlq ABDOMINAL pain, onset yesterday, N/V/D as well.  Patient states she is having an [employer] tomorrow per Dr Kar Matthews

## 2020-03-02 NOTE — ED NOTES
Pt states she took pepto at home yesterday and it helped reduce the number of diarrhea episodes she was having.

## 2020-03-03 ENCOUNTER — HOSPITAL ENCOUNTER (OUTPATIENT)
Dept: ULTRASOUND IMAGING | Age: 60
Discharge: HOME | End: 2020-03-03
Attending: FAMILY MEDICINE
Payer: COMMERCIAL

## 2020-03-03 DIAGNOSIS — K43.2 INCISIONAL HERNIA, WITHOUT OBSTRUCTION OR GANGRENE: ICD-10-CM

## 2020-03-03 PROCEDURE — 76705 ECHO EXAM OF ABDOMEN: CPT

## 2020-03-03 NOTE — DISCHARGE INSTRUCTIONS

## 2020-03-04 LAB
BACTERIA SPEC CULT: NORMAL
SERVICE CMNT-IMP: NORMAL

## 2020-03-05 ENCOUNTER — OFFICE VISIT (OUTPATIENT)
Dept: FAMILY MEDICINE CLINIC | Age: 60
End: 2020-03-05

## 2020-03-05 VITALS
OXYGEN SATURATION: 99 % | SYSTOLIC BLOOD PRESSURE: 122 MMHG | DIASTOLIC BLOOD PRESSURE: 80 MMHG | HEART RATE: 87 BPM | WEIGHT: 153 LBS | HEIGHT: 64 IN | TEMPERATURE: 98.2 F | BODY MASS INDEX: 26.12 KG/M2 | RESPIRATION RATE: 14 BRPM

## 2020-03-05 DIAGNOSIS — G47.01 INSOMNIA SECONDARY TO CHRONIC PAIN: ICD-10-CM

## 2020-03-05 DIAGNOSIS — R81 GLUCOSURIA: ICD-10-CM

## 2020-03-05 DIAGNOSIS — J45.40 MODERATE PERSISTENT ASTHMA WITHOUT COMPLICATION: ICD-10-CM

## 2020-03-05 DIAGNOSIS — G89.29 INSOMNIA SECONDARY TO CHRONIC PAIN: ICD-10-CM

## 2020-03-05 DIAGNOSIS — E11.65 TYPE 2 DIABETES MELLITUS WITH HYPERGLYCEMIA, WITHOUT LONG-TERM CURRENT USE OF INSULIN (HCC): ICD-10-CM

## 2020-03-05 DIAGNOSIS — F41.9 ANXIETY: ICD-10-CM

## 2020-03-05 DIAGNOSIS — R10.2 PELVIC PAIN: Primary | ICD-10-CM

## 2020-03-05 RX ORDER — DULOXETIN HYDROCHLORIDE 20 MG/1
20 CAPSULE, DELAYED RELEASE ORAL DAILY
Qty: 30 CAP | Refills: 2 | Status: SHIPPED | OUTPATIENT
Start: 2020-03-05 | End: 2020-04-22

## 2020-03-05 NOTE — PROGRESS NOTES
Katelin Sykes presents today for   Chief Complaint   Patient presents with    Abdominal Pain       Is someone accompanying this pt? No    Is the patient using any DME equipment during OV? No    Depression Screening:  3 most recent PHQ Screens 1/29/2020   Little interest or pleasure in doing things Not at all   Feeling down, depressed, irritable, or hopeless Not at all   Total Score PHQ 2 0       Learning Assessment:  Learning Assessment 7/22/2019   PRIMARY LEARNER Patient   HIGHEST LEVEL OF EDUCATION - PRIMARY LEARNER  -   BARRIERS PRIMARY LEARNER NONE   CO-LEARNER CAREGIVER No   PRIMARY LANGUAGE ENGLISH   LEARNER PREFERENCE PRIMARY READING   ANSWERED BY Patient   RELATIONSHIP SELF       Abuse Screening:  Abuse Screening Questionnaire 7/22/2019   Do you ever feel afraid of your partner? N   Are you in a relationship with someone who physically or mentally threatens you? N   Is it safe for you to go home? Y       Fall Risk  Fall Risk Assessment, last 12 mths 7/22/2019   Able to walk? Yes   Fall in past 12 months? No       Health Maintenance reviewed and discussed and ordered per Provider. Health Maintenance Due   Topic Date Due    DTaP/Tdap/Td series (1 - Tdap) 02/04/1971    PAP AKA CERVICAL CYTOLOGY  02/04/1981    Shingrix Vaccine Age 50> (1 of 2) 02/04/2010    Eye Exam Retinal or Dilated  05/29/2019   . Coordination of Care:  1. Have you been to the ER, urgent care clinic since your last visit? Hospitalized since your last visit? Yes; [facility] ER 3/2/2020 - Abdominal pain    2. Have you seen or consulted any other health care providers outside of the [address] [place] [address] since your last visit? Include any pap smears or colon screening.  No      Last  Checked N/A  Last UDS Checked N/A  Last Pain contract signed: N/A

## 2020-03-05 NOTE — PROGRESS NOTES
Emile Schlatter is a 61 y.o.  female and presents with    Chief Complaint   Patient presents with    Abdominal Pain    Fatigue    Anxiety    Insomnia     \"trouble sleeping\"    Results     Imaging results    ED Follow-up     Subjective:  Ms. Siena Zurita returns for f/u abdominal pain. She developed nausea and vomiting and diarrhea to accompany the pain. She was seen in the ER 3 days ago and had CT scan of abdomen; she was given IV fluids and morphine. Depression Review:  Patient is seen for followup of depression. Treatment includes none and no other therapies. Ongoing symptoms include depressed mood, anhedonia, insomnia, fatigue, feelings of worthlessness/guilt, difficulty concentrating and hopelessness. She denies hypersomnia, impaired memory and recurrent thoughts of death.   She experiences the following side effects from the treatment: none. Anxiety Review:  Patient is seen for sleep disturbance. Current treatment includes doxepin and no other therapies. Ongoing symptoms include: insomnia. Patient denies: palpitations, sweating, chest pain, shortness of breath, dizziness, paresthesias, racing thoughts, psychomotor agitation, feelings of losing control, difficulty concentrating, suicidal ideation. Reported side effects from the treatment: none.     ROS   Constitutional: Negative for fever. HENT: Negative for sore throat.    Eyes: Negative for visual disturbance. Respiratory: Negative for shortness of breath.    Cardiovascular: Negative for chest pain. Gastrointestinal: Positive for abdominal pain. Endocrine: Negative for polyuria. Genitourinary: Positive for frequency. Negative for difficulty urinating and hematuria. Musculoskeletal: Positive for back pain. Negative for gait problem. Skin: wound site healing  Allergic/Immunologic: Negative for immunocompromised state. Neurological: Negative for syncope.    Psychiatric/Behavioral: Positive for sleep disturbance.   All other systems reviewed and are negative. Objective:  Vitals:    03/05/20 1547   BP: 122/80   Pulse: 87   Resp: 14   Temp: 98.2 °F (36.8 °C)   TempSrc: Oral   SpO2: 99%   Weight: 153 lb (69.4 kg)   Height: 5' 4\" (1.626 m)   PainSc:   9   PainLoc: Abdomen     Constitutional:       General: She is not in acute distress. Appearance: She is well-developed. HENT:      Head: Normocephalic and atraumatic. Right Ear: External ear normal.      Left Ear: External ear normal.      Nose: Nose normal.   Eyes:      General: No scleral icterus. Conjunctiva/sclera: Conjunctivae normal.      Pupils: Pupils are equal, round, and reactive to light. Neck:      Musculoskeletal: Normal range of motion and neck supple. Thyroid: No thyromegaly. Vascular: No JVD. Trachea: No tracheal deviation. Cardiovascular:      Rate and Rhythm: Normal rate and regular rhythm. Heart sounds: No murmur. No friction rub. Pulmonary:      Effort: Pulmonary effort is normal.      Breath sounds: Normal breath sounds. No stridor. Chest:      Chest wall: No tenderness. Abdominal:      General: Bowel sounds are normal. There is no distension. Palpations: Abdomen is soft. Tenderness: There is abdominal tenderness. There is no guarding or rebound. Comments: Right lower quadrant tenderness no obvious hernia appreciated no midline tenderness mild suprapubic tenderness as well     Musculoskeletal: Normal range of motion. General: No tenderness. Lymphadenopathy:      Cervical: No cervical adenopathy. Skin:     General: Skin is warm and dry. Neurological:      Mental Status: She is alert. Cranial Nerves: No cranial nerve deficit. Coordination: Coordination normal.   Psychiatric:         Behavior: Behavior normal.         Thought Content: Thought content normal.         Judgment: Judgment normal.     TESTS  CT abd/pelvis  IMPRESSION:     1.   No acute intra-abdominal or pelvic abnormality demonstrated. Improved  anterior abdominal wall stranding. No bowel obstruction. No focal bowel wall  thickening or dilatation. 2. Probable steatosis and left ovarian cyst without change. Assessment/Plan:    1. Pelvic pain  Reviewed CT abd/pelvis; recommend f/u with gynecology    2. Type 2 diabetes mellitus with hyperglycemia, without long-term current use of insulin (HCC)  Goal hgb a1c <7    3. Glucosuria  Control blood glucose    4. Moderate persistent asthma without complication  Continue inhaled therapy    5. Anxiety  Continue treatment  - DULoxetine (CYMBALTA) 20 mg capsule; Take 1 Cap by mouth daily. Dispense: 30 Cap; Refill: 2    6. Insomnia secondary to chronic pain  Sleep improved  - DULoxetine (CYMBALTA) 20 mg capsule; Take 1 Cap by mouth daily. Dispense: 30 Cap; Refill: 2      Lab review: labs reviewed, I note that urinalysis abnormal      I have discussed the diagnosis with the patient and the intended plan as seen in the above orders. The patient has received an after-visit summary and questions were answered concerning future plans. I have discussed medication side effects and warnings with the patient as well. I have reviewed the plan of care with the patient, accepted their input and they are in agreement with the treatment goals.

## 2020-04-07 ENCOUNTER — HOSPITAL ENCOUNTER (EMERGENCY)
Age: 60
Discharge: HOME | End: 2020-04-07
Attending: EMERGENCY MEDICINE
Payer: COMMERCIAL

## 2020-04-07 VITALS
BODY MASS INDEX: 25.95 KG/M2 | SYSTOLIC BLOOD PRESSURE: 177 MMHG | WEIGHT: 152 LBS | HEART RATE: 89 BPM | TEMPERATURE: 99.3 F | OXYGEN SATURATION: 98 % | HEIGHT: 64 IN | RESPIRATION RATE: 16 BRPM | DIASTOLIC BLOOD PRESSURE: 103 MMHG

## 2020-04-07 DIAGNOSIS — N76.0 VAGINITIS AND VULVOVAGINITIS: Primary | ICD-10-CM

## 2020-04-07 LAB
APPEARANCE UR: ABNORMAL
BACTERIA URNS QL MICRO: NEGATIVE /HPF
BILIRUB UR QL: NEGATIVE
COLOR UR: YELLOW
EPITH CASTS URNS QL MICRO: NORMAL /LPF (ref 0–5)
GLUCOSE UR STRIP.AUTO-MCNC: >1000 MG/DL
HGB UR QL STRIP: NEGATIVE
KETONES UR QL STRIP.AUTO: NEGATIVE MG/DL
LEUKOCYTE ESTERASE UR QL STRIP.AUTO: ABNORMAL
NITRITE UR QL STRIP.AUTO: NEGATIVE
PH UR STRIP: 5.5 [PH] (ref 5–8)
PROT UR STRIP-MCNC: NEGATIVE MG/DL
RBC #/AREA URNS HPF: NORMAL /HPF (ref 0–5)
SP GR UR REFRACTOMETRY: 1.02 (ref 1–1.03)
UROBILINOGEN UR QL STRIP.AUTO: 0.2 EU/DL (ref 0.2–1)
WBC URNS QL MICRO: NORMAL /HPF (ref 0–4)

## 2020-04-07 PROCEDURE — 74011250637 HC RX REV CODE- 250/637: Performed by: EMERGENCY MEDICINE

## 2020-04-07 PROCEDURE — 99284 EMERGENCY DEPT VISIT MOD MDM: CPT

## 2020-04-07 PROCEDURE — 87086 URINE CULTURE/COLONY COUNT: CPT

## 2020-04-07 PROCEDURE — 81001 URINALYSIS AUTO W/SCOPE: CPT

## 2020-04-07 RX ORDER — FLUCONAZOLE 150 MG/1
150 TABLET ORAL
Qty: 1 TAB | Refills: 0 | Status: SHIPPED | OUTPATIENT
Start: 2020-04-07 | End: 2020-04-07

## 2020-04-07 RX ORDER — HYDROCODONE BITARTRATE AND ACETAMINOPHEN 5; 325 MG/1; MG/1
1 TABLET ORAL ONCE
Status: COMPLETED | OUTPATIENT
Start: 2020-04-07 | End: 2020-04-07

## 2020-04-07 RX ORDER — FLUCONAZOLE 100 MG/1
200 TABLET ORAL
Status: COMPLETED | OUTPATIENT
Start: 2020-04-07 | End: 2020-04-07

## 2020-04-07 RX ORDER — HYDROCODONE BITARTRATE AND ACETAMINOPHEN 5; 325 MG/1; MG/1
1 TABLET ORAL
Qty: 6 TAB | Refills: 0 | Status: SHIPPED | OUTPATIENT
Start: 2020-04-07 | End: 2020-04-09

## 2020-04-07 RX ADMIN — FLUCONAZOLE 200 MG: 100 TABLET ORAL at 21:14

## 2020-04-07 RX ADMIN — HYDROCODONE BITARTRATE AND ACETAMINOPHEN 1 TABLET: 5; 325 TABLET ORAL at 21:14

## 2020-04-08 ENCOUNTER — PATIENT OUTREACH (OUTPATIENT)
Dept: CASE MANAGEMENT | Age: 60
End: 2020-04-08

## 2020-04-08 NOTE — PROGRESS NOTES
MSW attempted to contact [facility] as a follow up post her ED discharge. MSW left message on voicemail with introduction of self, role and reason for call. Contact information was provided for call back. MSW will re-attempt to contact the patient at a later time.

## 2020-04-08 NOTE — ED TRIAGE NOTES
Alert female arrives to ED with c/o vaginal itching onset yesterday. No relief monistat yesterday. +10/10 sharp pain. Pt reports hx dm, bs running 130s. +dysuria.

## 2020-04-08 NOTE — DISCHARGE INSTRUCTIONS
Thank you so much for visiting us today. Please make sure to call your doctor today to be rechecked in 1-3 days. Bring your results from here with you when you do. Return immediately if any fevers, headaches, chest pain, difficulty breathing, abdominal pain, worsening or changing symptoms, or any other concerns. Patient Education        Vaginitis: Care Instructions  Your Care Instructions    Vaginitis is soreness or infection of the vagina. This common problem can cause itching and burning. And it can cause a change in vaginal discharge. Sometimes it can cause pain during sex. Vaginitis may be caused by bacteria, yeast, or other germs. Some infections that cause it are caught from a sexual partner. Bath products, spermicides, and douches can irritate the vagina too. Some women have this problem during and after menopause. A drop in estrogen levels during this time can cause dryness, soreness, and pain during sex. Your doctor can give you medicine to treat an infection. And home care may help you feel better. For certain types of infections, your sex partner must be treated too. Follow-up care is a key part of your treatment and safety. Be sure to make and go to all appointments, and call your doctor if you are having problems. It's also a good idea to know your test results and keep a list of the medicines you take. How can you care for yourself at home? · If your doctor prescribed antibiotics, take them as directed. Do not stop taking them just because you feel better. You need to take the full course of antibiotics. · Take your medicines exactly as prescribed. Call your doctor if you think you are having a problem with your medicine. · Do not eat or drink anything that has alcohol if you are taking metronidazole (Flagyl). · If you have a yeast infection, use over-the-counter products as your doctor tells you to. Or take medicine your doctor prescribes exactly as directed.   · Wash your vaginal area daily with water. You also can use a mild, unscented soap if you want. · Do not use scented bath products. And do not use vaginal sprays or douches. · Put a washcloth soaked in cool water on the area to relieve itching. Or you can take cool baths. · If you have dryness because of menopause, use estrogen cream or pills that your doctor prescribes. · Ask your doctor about when it is okay to have sex. · Use a personal lubricant before sex if you have dryness. Examples are Astroglide, K-Y Jelly, and Wet Lubricant Gel. · Ask your doctor if your sex partner also needs treatment. When should you call for help? Call your doctor now or seek immediate medical care if:    · You have a fever and pelvic pain.    Watch closely for changes in your health, and be sure to contact your doctor if:    · You have bleeding other than your period.     · You do not get better as expected. Where can you learn more? Go to http://betzy-didi.info/  Enter [id number] in the search box to learn more about \"Vaginitis: Care Instructions. \"  Current as of: November 7, 2019Content Version: 12.4  © 5590-6453 [employer]. Care instructions adapted under license by [employer] (which disclaims liability or warranty for this information). If you have questions about a medical condition or this instruction, always ask your healthcare professional. [place] [address] any warranty or liability for your use of this information.

## 2020-04-09 ENCOUNTER — PATIENT OUTREACH (OUTPATIENT)
Dept: CASE MANAGEMENT | Age: 60
End: 2020-04-09

## 2020-04-09 LAB
BACTERIA SPEC CULT: NORMAL
SERVICE CMNT-IMP: NORMAL

## 2020-04-09 NOTE — PROGRESS NOTES
MSW attempted to contact the patient for follow up post ED discharge. Phone call went to voicemail. MSW introduced self, role,  gave reason for call and provided call back information. Messages left on home phone and mobile. MSW will re-attempt to contact the patient at a later time.

## 2020-04-13 ENCOUNTER — PATIENT OUTREACH (OUTPATIENT)
Dept: CASE MANAGEMENT | Age: 60
End: 2020-04-13

## 2020-04-13 NOTE — PROGRESS NOTES
COVID-19 ED/Flu Clinic Initial Outreach Note    Patient contacted regarding recent visit for viral symptoms. This Abdiasarnulfoponcho Cole contacted the patient by telephone to perform post discharge call. Verified name and  with patient as identifiers. Provided introduction to self, and reason for call due to viral symptoms of infection and/or exposure to COVID-19. Patient presented to emergency department/flu clinic with complaints of viral symptoms/exposure to COVID. Patient reports symptoms are improving. Due to no new or worsening symptoms the RN CTN/ACMITCHELL was not notified for escalation. Discussed exposure protocols and quarantine with CDC Guidelines What To Do If You Are Sick    Patient was given an opportunity for questions and concerns. Stay home except to get medical care    Separate yourself from other people and animals in your home    Call ahead before visiting your doctor    Wear a facemask    Cover your coughs and sneezes    Clean your hands often    Avoid sharing personal household items    Clean all high-touch surfaces everyday    Monitor your symptoms  Seek prompt medical attention if your illness is worsening (e.g., difficulty breathing). Before seeking care, call your healthcare provider and tell them that you have, or are being evaluated for, COVID-19. Put on a facemask before you enter the facility. These steps will help the healthcare provider's office to keep other people in the office or waiting room from getting infected or exposed. Ask your healthcare provider to call the local or [facility] health department. Persons who are placed under If you have a medical emergency and need to call 911, notify the dispatch personnel that you have, or are being evaluated for COVID-19. If possible, put on a facemask before emergency medical services arrive.     The patient agrees to contact the [facility] exposure line [phone], local health department [address]  ([phone] and PCP office for questions related to their healthcare. Author provided contact information for future reference. Patient/family/caregiver given [facility] and agrees to enroll no  Patient preferred e-mail: n/a  Patient preferred phone contact: n/a   Based on Loop alert triggers, patient will be contacted by nurse care manager for worsening symptoms.

## 2020-04-22 ENCOUNTER — HOSPITAL ENCOUNTER (EMERGENCY)
Age: 60
Discharge: HOME | End: 2020-04-23
Attending: EMERGENCY MEDICINE
Payer: COMMERCIAL

## 2020-04-22 ENCOUNTER — APPOINTMENT (OUTPATIENT)
Dept: CT IMAGING | Age: 60
End: 2020-04-22
Attending: EMERGENCY MEDICINE
Payer: COMMERCIAL

## 2020-04-22 DIAGNOSIS — R11.2 NON-INTRACTABLE VOMITING WITH NAUSEA, UNSPECIFIED VOMITING TYPE: ICD-10-CM

## 2020-04-22 DIAGNOSIS — R10.31 ABDOMINAL PAIN, RIGHT LOWER QUADRANT: Primary | ICD-10-CM

## 2020-04-22 LAB
ALBUMIN SERPL-MCNC: 3.9 G/DL (ref 3.4–5)
ALBUMIN/GLOB SERPL: 0.9 {RATIO} (ref 0.8–1.7)
ALP SERPL-CCNC: 133 U/L (ref 45–117)
ALT SERPL-CCNC: 62 U/L (ref 13–56)
ANION GAP SERPL CALC-SCNC: 8 MMOL/L (ref 3–18)
APPEARANCE UR: CLEAR
AST SERPL-CCNC: 44 U/L (ref 10–38)
BASOPHILS # BLD: 0 K/UL (ref 0–0.1)
BASOPHILS NFR BLD: 0 % (ref 0–2)
BILIRUB SERPL-MCNC: 0.3 MG/DL (ref 0.2–1)
BILIRUB UR QL: NEGATIVE
BUN SERPL-MCNC: 18 MG/DL (ref 7–18)
BUN/CREAT SERPL: 15 (ref 12–20)
CALCIUM SERPL-MCNC: 9.4 MG/DL (ref 8.5–10.1)
CHLORIDE SERPL-SCNC: 103 MMOL/L (ref 100–111)
CO2 SERPL-SCNC: 30 MMOL/L (ref 21–32)
COLOR UR: YELLOW
CREAT SERPL-MCNC: 1.24 MG/DL (ref 0.6–1.3)
DIFFERENTIAL METHOD BLD: ABNORMAL
EOSINOPHIL # BLD: 0.2 K/UL (ref 0–0.4)
EOSINOPHIL NFR BLD: 4 % (ref 0–5)
ERYTHROCYTE [DISTWIDTH] IN BLOOD BY AUTOMATED COUNT: 14.6 % (ref 11.6–14.5)
GLOBULIN SER CALC-MCNC: 4.5 G/DL (ref 2–4)
GLUCOSE SERPL-MCNC: 121 MG/DL (ref 74–99)
GLUCOSE UR STRIP.AUTO-MCNC: >1000 MG/DL
HCT VFR BLD AUTO: 43.5 % (ref 35–45)
HGB BLD-MCNC: 13.9 G/DL (ref 12–16)
HGB UR QL STRIP: NEGATIVE
KETONES UR QL STRIP.AUTO: ABNORMAL MG/DL
LEUKOCYTE ESTERASE UR QL STRIP.AUTO: NEGATIVE
LIPASE SERPL-CCNC: 171 U/L (ref 73–393)
LYMPHOCYTES # BLD: 2.1 K/UL (ref 0.9–3.6)
LYMPHOCYTES NFR BLD: 34 % (ref 21–52)
MCH RBC QN AUTO: 27.1 PG (ref 24–34)
MCHC RBC AUTO-ENTMCNC: 32 G/DL (ref 31–37)
MCV RBC AUTO: 84.8 FL (ref 74–97)
MONOCYTES # BLD: 0.3 K/UL (ref 0.05–1.2)
MONOCYTES NFR BLD: 5 % (ref 3–10)
NEUTS SEG # BLD: 3.5 K/UL (ref 1.8–8)
NEUTS SEG NFR BLD: 57 % (ref 40–73)
NITRITE UR QL STRIP.AUTO: NEGATIVE
PH UR STRIP: 5.5 [PH] (ref 5–8)
PLATELET # BLD AUTO: 332 K/UL (ref 135–420)
PMV BLD AUTO: 11.1 FL (ref 9.2–11.8)
POTASSIUM SERPL-SCNC: 3.8 MMOL/L (ref 3.5–5.5)
PROT SERPL-MCNC: 8.4 G/DL (ref 6.4–8.2)
PROT UR STRIP-MCNC: NEGATIVE MG/DL
RBC # BLD AUTO: 5.13 M/UL (ref 4.2–5.3)
SODIUM SERPL-SCNC: 141 MMOL/L (ref 136–145)
SP GR UR REFRACTOMETRY: 1.03 (ref 1–1.03)
UROBILINOGEN UR QL STRIP.AUTO: 1 EU/DL (ref 0.2–1)
WBC # BLD AUTO: 6 K/UL (ref 4.6–13.2)

## 2020-04-22 PROCEDURE — 96375 TX/PRO/DX INJ NEW DRUG ADDON: CPT

## 2020-04-22 PROCEDURE — 81003 URINALYSIS AUTO W/O SCOPE: CPT

## 2020-04-22 PROCEDURE — 99284 EMERGENCY DEPT VISIT MOD MDM: CPT

## 2020-04-22 PROCEDURE — 80053 COMPREHEN METABOLIC PANEL: CPT

## 2020-04-22 PROCEDURE — 74011636320 HC RX REV CODE- 636/320: Performed by: EMERGENCY MEDICINE

## 2020-04-22 PROCEDURE — 83690 ASSAY OF LIPASE: CPT

## 2020-04-22 PROCEDURE — 96374 THER/PROPH/DIAG INJ IV PUSH: CPT

## 2020-04-22 PROCEDURE — 85025 COMPLETE CBC W/AUTO DIFF WBC: CPT

## 2020-04-22 PROCEDURE — 74011250636 HC RX REV CODE- 250/636: Performed by: EMERGENCY MEDICINE

## 2020-04-22 PROCEDURE — 74177 CT ABD & PELVIS W/CONTRAST: CPT

## 2020-04-22 RX ORDER — ONDANSETRON 2 MG/ML
4 INJECTION INTRAMUSCULAR; INTRAVENOUS
Status: COMPLETED | OUTPATIENT
Start: 2020-04-22 | End: 2020-04-22

## 2020-04-22 RX ORDER — KETOROLAC TROMETHAMINE 30 MG/ML
30 INJECTION, SOLUTION INTRAMUSCULAR; INTRAVENOUS
Status: DISCONTINUED | OUTPATIENT
Start: 2020-04-22 | End: 2020-04-22

## 2020-04-22 RX ORDER — ONDANSETRON 4 MG/1
8 TABLET, FILM COATED ORAL
Qty: 12 TAB | Refills: 0 | Status: SHIPPED | OUTPATIENT
Start: 2020-04-22 | End: 2020-06-03

## 2020-04-22 RX ORDER — KETOROLAC TROMETHAMINE 15 MG/ML
15 INJECTION, SOLUTION INTRAMUSCULAR; INTRAVENOUS
Status: COMPLETED | OUTPATIENT
Start: 2020-04-22 | End: 2020-04-22

## 2020-04-22 RX ORDER — MORPHINE SULFATE 2 MG/ML
4 INJECTION, SOLUTION INTRAMUSCULAR; INTRAVENOUS ONCE
Status: COMPLETED | OUTPATIENT
Start: 2020-04-22 | End: 2020-04-22

## 2020-04-22 RX ADMIN — IOPAMIDOL 100 ML: 612 INJECTION, SOLUTION INTRAVENOUS at 22:50

## 2020-04-22 RX ADMIN — ONDANSETRON 4 MG: 2 INJECTION INTRAMUSCULAR; INTRAVENOUS at 21:22

## 2020-04-22 RX ADMIN — KETOROLAC TROMETHAMINE 15 MG: 15 INJECTION, SOLUTION INTRAMUSCULAR; INTRAVENOUS at 22:17

## 2020-04-22 RX ADMIN — MORPHINE SULFATE 4 MG: 2 INJECTION, SOLUTION INTRAMUSCULAR; INTRAVENOUS at 21:22

## 2020-04-22 RX ADMIN — SODIUM CHLORIDE 1000 ML: 900 INJECTION, SOLUTION INTRAVENOUS at 21:25

## 2020-04-23 ENCOUNTER — PATIENT OUTREACH (OUTPATIENT)
Dept: CASE MANAGEMENT | Age: 60
End: 2020-04-23

## 2020-04-23 VITALS
HEART RATE: 84 BPM | BODY MASS INDEX: 25.61 KG/M2 | OXYGEN SATURATION: 100 % | WEIGHT: 150 LBS | RESPIRATION RATE: 18 BRPM | SYSTOLIC BLOOD PRESSURE: 122 MMHG | DIASTOLIC BLOOD PRESSURE: 80 MMHG | HEIGHT: 64 IN | TEMPERATURE: 99.1 F

## 2020-04-23 RX ORDER — HYDROCODONE BITARTRATE AND ACETAMINOPHEN 5; 325 MG/1; MG/1
1 TABLET ORAL
Qty: 6 TAB | Refills: 0 | Status: SHIPPED | OUTPATIENT
Start: 2020-04-22 | End: 2020-06-04 | Stop reason: SDUPTHER

## 2020-04-23 NOTE — ED NOTES
Note:  Assuming care of patient at beginning of shift    10:29 PM  I, Carla Alfonso MD, assumed care of patient at the beginning of my shift. 10:29 PM    Date: 4/22/2020  Patient Name: Vladimir Amado    History of Presenting Illness     Chief Complaint   Patient presents with    Abdominal Pain    Vomiting       Nursing notes regarding the HPI and triage nursing notes were reviewed. Prior medical records were reviewed. Current Facility-Administered Medications   Medication Dose Route Frequency Provider Last Rate Last Dose    iopamidoL (ISOVUE 300) 61 % contrast injection 100 mL  100 mL IntraVENous RAD ONCE Janak Cottrell MD        ketorolac (TORADOL) injection 15 mg  15 mg IntraVENous NOW Janak Cottrell MD         Current Outpatient Medications   Medication Sig Dispense Refill    acetaminophen (TYLENOL) 325 mg tablet Take 2 Tabs by mouth every four (4) hours as needed for Pain. 20 Tab 0    metFORMIN (GLUMETZA ER) 500 mg TG24 24 hour tablet Take 1,000 mg by mouth two (2) times a day. take 1 tablet by mouth twice a day 120 Tab 11    amLODIPine (NORVASC) 5 mg tablet Take 1 Tab by mouth daily. 30 Tab 0    albuterol sulfate (PROAIR RESPICLICK) 90 mcg/actuation aepb Take 2 Puffs by inhalation every four (4) hours as needed for Wheezing, Shortness of Breath or Cough. 1 Inhaler 0    ipratropium (ATROVENT) 0.02 % soln 2.5 mL by Nebulization route every six (6) hours as needed for Cough. 30 Vial 1    albuterol (PROVENTIL VENTOLIN) 2.5 mg /3 mL (0.083 %) nebu 3 mL by Nebulization route every six (6) hours as needed for Wheezing. 30 Nebule 1    ondansetron (ZOFRAN ODT) 4 mg disintegrating tablet Take 1-2 tablets every 6-8 hours as needed for nausea and vomiting. 10 Tab 0    dexAMETHasone (DECADRON) 2 mg tablet Take 10 mg on the day after tomorrow 5 Tab 0    budesonide-formoterol (SYMBICORT) 160-4.5 mcg/actuation HFAA Take 2 Puffs by inhalation two (2) times a day.  1 Inhaler 2    doxepin (SINEQUAN) 25 mg capsule Take 1 Cap by mouth nightly. 30 Cap 2    polyethylene glycol (MIRALAX) 17 gram/dose powder Take 17 g by mouth daily. 1 tablespoon with 8 oz of water daily 595 g 0    multivitamin (ONE A DAY) tablet Take 1 Tab by mouth daily.  ALLERGY RELIEF, CETIRIZINE, 10 mg tablet take 1 tablet by mouth once daily 90 Tab 3    bisoprolol-hydroCHLOROthiazide (ZIAC) 2.5-6.25 mg per tablet Take 1 Tab by mouth daily. 90 Tab 3    omeprazole (PRILOSEC) 10 mg capsule take 1 capsule by mouth once daily 30 Cap 11    folic acid (FOLVITE) 1 mg tablet Take  by mouth daily.       glucose blood VI test strips (ONETOUCH ULTRA TEST) strip Check blood glucose as directed 100 Strip 1       Past History     Past Medical History:  Past Medical History:   Diagnosis Date    Abdominal adhesions     Adnexal cyst 7/30/2019    Left    Anemia     Asthma     Chronic pelvic pain in female 7/30/2019    Diabetes mellitus     Dyskinesia     bilateral    Essential hypertension     GERD (gastroesophageal reflux disease)     Hypertension     Menopause     Microhematuria     Rheumatoid arteritis ([facility] [address] 2018    Stool color black        Past Surgical History:  Past Surgical History:   Procedure Laterality Date    COLONOSCOPY N/A 1/21/2019    COL[facility] ENDOSCOPY    HX CHOLECYSTECTOMY  6/28/10    HX COLONOSCOPY      HX ENDOSCOPY      HX HERNIA REPAIR      HX HYSTERECTOMY  1989    Fibroids    HX KNEE REPLACEMENT Left     HX KNEE REPLACEMENT Right 06/2018    HX OOPHORECTOMY  12/2000    HX ORTHOPAEDIC Right     ankle- multiple surgeries    HX SMALL BOWEL RESECTION  12/2000    HX SMALL BOWEL RESECTION  02/21/2017    Dr. Salvador Modi         Family History:  Family History   Problem Relation Age of Onset    Hypertension Other         parent    Breast Cancer Other 21    Heart Disease Father     Diabetes Father     Lung Disease Father    Maxime Loser Diabetes Mother     Hypertension Other         sibling    Diabetes Other         parent    Kidney Disease Brother     Diabetes Brother     Lung Disease Brother        Social History:  Social History     Tobacco Use    Smoking status: Never Smoker    Smokeless tobacco: Never Used   Substance Use Topics    Alcohol use: No    Drug use: No       Allergies: Allergies   Allergen Reactions    Macrodantin [Nitrofurantoin Macrocrystalline] Itching and Other (comments)    Tape [Adhesive] Other (comments)     Paper tape-- feels like burning skin  Burned skin when had wound vac       Patient's primary care provider (as noted in EPIC):  Kalyan Fong MD    Abnormal lab results from this emergency department encounter:  Labs Reviewed   CBC WITH AUTOMATED DIFF - Abnormal; Notable for the following components:       Result Value    RDW 14.6 (*)     All other components within normal limits   METABOLIC PANEL, COMPREHENSIVE - Abnormal; Notable for the following components:    Glucose 121 (*)     GFR est AA 53 (*)     GFR est non-AA 44 (*)     ALT (SGPT) 62 (*)     AST (SGOT) 44 (*)     Alk.  phosphatase 133 (*)     Protein, total 8.4 (*)     Globulin 4.5 (*)     All other components within normal limits   URINALYSIS W/ RFLX MICROSCOPIC - Abnormal; Notable for the following components:    Glucose >1,000 (*)     Ketone TRACE (*)     All other components within normal limits   LIPASE       Lab values for this patient within approximately the last 12 hours:  Recent Results (from the past 12 hour(s))   URINALYSIS W/ RFLX MICROSCOPIC    Collection Time: 04/22/20  8:38 PM   Result Value Ref Range    Color YELLOW      Appearance CLEAR      Specific gravity 1.029 1.005 - 1.030      pH (UA) 5.5 5.0 - 8.0      Protein Negative NEG mg/dL    Glucose >1,000 (A) NEG mg/dL    Ketone TRACE (A) NEG mg/dL    Bilirubin Negative NEG      Blood Negative NEG      Urobilinogen 1.0 0.2 - 1.0 EU/dL    Nitrites Negative NEG      Leukocyte Esterase Negative NEG     CBC WITH AUTOMATED DIFF    Collection Time: 04/22/20  8:50 PM   Result Value Ref Range    WBC 6.0 4.6 - 13.2 K/uL    RBC 5.13 4.20 - 5.30 M/uL    HGB 13.9 12.0 - 16.0 g/dL    HCT 43.5 35.0 - 45.0 %    MCV 84.8 74.0 - 97.0 FL    MCH 27.1 24.0 - 34.0 PG    MCHC 32.0 31.0 - 37.0 g/dL    RDW 14.6 (H) 11.6 - 14.5 %    PLATELET 351 593 - 589 K/uL    MPV 11.1 9.2 - 11.8 FL    NEUTROPHILS 57 40 - 73 %    LYMPHOCYTES 34 21 - 52 %    MONOCYTES 5 3 - 10 %    EOSINOPHILS 4 0 - 5 %    BASOPHILS 0 0 - 2 %    ABS. NEUTROPHILS 3.5 1.8 - 8.0 K/UL    ABS. LYMPHOCYTES 2.1 0.9 - 3.6 K/UL    ABS. MONOCYTES 0.3 0.05 - 1.2 K/UL    ABS. EOSINOPHILS 0.2 0.0 - 0.4 K/UL    ABS. BASOPHILS 0.0 0.0 - 0.1 K/UL    DF AUTOMATED     METABOLIC PANEL, COMPREHENSIVE    Collection Time: 04/22/20  8:50 PM   Result Value Ref Range    Sodium 141 136 - 145 mmol/L    Potassium 3.8 3.5 - 5.5 mmol/L    Chloride 103 100 - 111 mmol/L    CO2 30 21 - 32 mmol/L    Anion gap 8 3.0 - 18 mmol/L    Glucose 121 (H) 74 - 99 mg/dL    BUN 18 7.0 - 18 MG/DL    Creatinine 1.24 0.6 - 1.3 MG/DL    BUN/Creatinine ratio 15 12 - 20      GFR est AA 53 (L) >60 ml/min/1.73m2    GFR est non-AA 44 (L) >60 ml/min/1.73m2    Calcium 9.4 8.5 - 10.1 MG/DL    Bilirubin, total 0.3 0.2 - 1.0 MG/DL    ALT (SGPT) 62 (H) 13 - 56 U/L    AST (SGOT) 44 (H) 10 - 38 U/L    Alk. phosphatase 133 (H) 45 - 117 U/L    Protein, total 8.4 (H) 6.4 - 8.2 g/dL    Albumin 3.9 3.4 - 5.0 g/dL    Globulin 4.5 (H) 2.0 - 4.0 g/dL    A-G Ratio 0.9 0.8 - 1.7     LIPASE    Collection Time: 04/22/20  8:50 PM   Result Value Ref Range    Lipase 171 73 - 393 U/L       Radiologist and cardiologist interpretations if available at time of this note:  Radiology results:  No results found.       Preliminary radiologist reading of CT abdomen pelvis with contrast:    Findings:    Several nondilated loops of small bowel demonstrate equalization mucosal hyperenhancement, most prominent in the mid abdomen subjective the prior laparotomy site. Findings may resent a mild ileus or enteritis. No discrete transition point to suggest obstruction. Persistent stranding anterior abdominal wall, inflammatory and/or scar changes. Medication(s) ordered for patient during this emergency visit encounter:  Medications   iopamidoL (ISOVUE 300) 61 % contrast injection 100 mL (has no administration in time range)   ketorolac (TORADOL) injection 15 mg (has no administration in time range)   sodium chloride 0.9 % bolus infusion 1,000 mL (1,000 mL IntraVENous New Bag 4/22/20 2125)   morphine injection 4 mg (4 mg IntraVENous Given 4/22/20 2122)   ondansetron (ZOFRAN) injection 4 mg (4 mg IntraVENous Given 4/22/20 2122)       Pt care assumed from Dr. Evaristo Cabot , ED provider. Pt complaint(s), current treatment plan, progression and available diagnostic results have been discussed thoroughly. Rounding occurred: no  Intended Disposition: TBD  Pending diagnostic reports and/or labs (please list): CT abdomen pelvis with contrast.    SPECIFIC PATIENT INSTRUCTIONS FROM THE PHYSICIAN WHO TREATED YOU IN THE ER TODAY:  1. Return if worse. 2. Follow up with your primary doctor in the next 1-2 days for outpatient reevaluation. 3. Norco for pain not controlled over-the-counter Tylenol. 4. Zofran as prescribed for nausea and/or vomiting. Patient is improved, resting quietly and comfortably. The patient will be discharged home. The patient was reassured that these symptoms do not appear to represent a serious or life threatening condition at this time. Warning signs of worsening condition were discussed and understood by the patient. Based on patient's age, coexisting illness, exam, and the results of this ED evaluation, the decision to treat as an outpatient was made. Based on the information available at time of discharge, acute pathology requiring immediate intervention was deemed relative unlikely.      While it is impossible to completely exclude the possibility of underlying serious disease or worsening of condition, I feel the relative likelihood is extremely low. I discussed this uncertainty with the patient, who understood ED evaluation and treatment and felt comfortable with the outpatient treatment plan. All questions regarding care, test results, and follow up were answered. The patient is stable and appropriate to discharge. They understand that they should return to the emergency department for any new or worsening symptoms. I stressed the importance of follow up for repeat assessment and possibly further evaluation/treatment. Dictation disclaimer:  Please note that this dictation was completed with [employer], the [employer] voice recognition software. Quite often unanticipated grammatical, syntax, homophones, and other interpretive errors are inadvertently transcribed by the computer software. Please disregard these errors. Please excuse any errors that have escaped final proofreading. Coding Diagnoses     Clinical Impression:   1. Abdominal pain, right lower quadrant    2. Non-intractable vomiting with nausea, unspecified vomiting type        Disposition     Disposition:  Home. Henry Zhou M.D.   [facility] Board Certified Emergency Physician

## 2020-04-23 NOTE — PROGRESS NOTES
Patient contacted regarding recent discharge and COVID-19 risk   Care Coordinator contacted the patient by telephone to perform post discharge assessment. Verified name and  with patient as identifiers. Patient has following risk factors of: asthma and diabetes. CTN/ACM reviewed discharge instructions, medical action plan and red flags related to discharge diagnosis. Reviewed and educated them on any new and changed medications related to discharge diagnosis. Advised obtaining a 90-day supply of all daily and as-needed medications. Education provided regarding infection prevention, and signs and symptoms of COVID-19 and when to seek medical attention with patient who verbalized understanding. Discussed exposure protocols and quarantine from [address] you at higher risk for severe illness  and given an opportunity for questions and concerns. The patient agrees to contact the COVID-19 hotline [phone] or PCP office for questions related to their healthcare. CTN/ACM provided contact information for future reference. From CDC: Are you at higher risk for severe illness?  Wash your hands often.  Avoid close contact (6 feet, which is about two arm lengths) with people who are sick.  Put distance between yourself and other people if COVID-19 is spreading in your community.  Clean and disinfect frequently touched surfaces.  Avoid all cruise travel and non-essential air travel.  Call your healthcare professional if you have concerns about COVID-19 and your underlying condition or if you are sick. For more information on steps you can take to protect yourself, see CDC's How to Protect Yourself      Patient/family/caregiver given information for Fabiana Mock and agrees to enroll no  Patient's preferred e-mail:  n/a  Patient's preferred phone number: n/a  Based on Loop alert triggers, patient will be contacted by nurse care manager for worsening symptoms.     Plan for follow-up call in 7-14 days based on severity of symptoms and risk factors.

## 2020-04-23 NOTE — PROGRESS NOTES
Ketorolac 15mg IV was therapeutically interchanged for ketorolac 30mg IV per the P&T Committee approved Therapeutic Interchanges Policy.     Johnnie Mckeon, [facility], Pharmacist  4/22/2020 10:17 PM

## 2020-04-23 NOTE — ED TRIAGE NOTES
Pt to ED c/o 1 day of severe LRQ pain with vomiting. States no nausea, good appetite but all food just comes right back up. Hx of bowel surgery august 2019, states she is having pain to incision site her PCP said it had adhesion causing pain to site. States this pain is new and worse than its ever been. States medium BM earlier today with straining and gas pain. Denies urinary symptoms.

## 2020-04-23 NOTE — ED PROVIDER NOTES
[facility]  EMERGENCY DEPARTMENT HISTORY AND PHYSICAL EXAM       Date: 4/22/2020   Patient Name: Edd Corral   YOB: 1960  Medical Record Number: [id number]    HISTORY OF PRESENTING ILLNESS:     Edd Corral is a 61 y.o. female presenting with the noted PMH to the ED c/o right-sided abdominal pain. Patient states it started earlier this morning. She states she started to eat breakfast and then started having nausea and vomiting x2 afterwards. She states she is had 5 episodes of diarrhea today as well. She states the pain on the right side comes and goes. Decreases when she puts pressure on it lays on that side. Increases with before she has a bowel movement or vomits. She states she took IBU Profen at 2 PM today but did not help. She states it might of been something she ate. She states she had a cheeseburger last night and then she had [address] today. She denies any urinary changes. Denies any trauma or travel. Denies any chest pain or shortness of breath. Denies any fevers or chills. Denies any cough or cold symptoms. Rest of 10 systems reviewed and negative.     Primary Care Provider: Gabriel Medrano MD   Specialist:    Past Medical History:   Past Medical History:   Diagnosis Date    Abdominal adhesions     Adnexal cyst 7/30/2019    Left    Anemia     Asthma     Chronic pelvic pain in female 7/30/2019    Diabetes mellitus     Dyskinesia     bilateral    Essential hypertension     GERD (gastroesophageal reflux disease)     Hypertension     Menopause     Microhematuria     Rheumatoid arteritis ([facility] [address] 2018    Stool color black         Past Surgical History:   Past Surgical History:   Procedure Laterality Date    COLONOSCOPY N/A 1/21/2019    COLONOSCOPY performed by Judi Garvin [facility] at [facility] ENDOSCOPY    HX CHOLECYSTECTOMY  6/28/10    HX COLONOSCOPY      HX ENDOSCOPY      HX HERNIA REPAIR      HX HYSTERECTOMY  1989    Fibroids  HX KNEE REPLACEMENT Left     HX KNEE REPLACEMENT Right 06/2018    HX OOPHORECTOMY  12/2000    HX ORTHOPAEDIC Right     ankle- multiple surgeries    HX SMALL BOWEL RESECTION  12/2000    HX SMALL BOWEL RESECTION  02/21/2017    Dr. Gallegos Severe          Social History:   Social History     Tobacco Use    Smoking status: Never Smoker    Smokeless tobacco: Never Used   Substance Use Topics    Alcohol use: No    Drug use: No        Allergies: Allergies   Allergen Reactions    Macrodantin [Nitrofurantoin Macrocrystalline] Itching and Other (comments)    Tape [Adhesive] Other (comments)     Paper tape-- feels like burning skin  Burned skin when had wound vac        REVIEW OF SYSTEMS:  Review of Systems      PHYSICAL EXAM:  Vitals:    04/22/20 2023   BP: (!) 139/91   Pulse: 96   Resp: 18   Temp: 99.1 °F (37.3 °C)   SpO2: 98%   Weight: 68 kg (150 lb)   Height: 5' 4\" (1.626 m)       Physical Exam   Vital signs reviewed. Alert oriented x 3 in NAD. HEENT: normocephalic atraumatic. Eyes are PERRLA EOMI. Conjunctiva normal.    External ears and nose normal.    Neck: normal external exam. No midline neck or back TTP. Lungs are clear to ascultation bilaterally. normal effort  Heart is regular rate and rhythm with no murmurs. Abdomen soft and nondistended. RLQ TTP. Old midline scar well healed. No rebound rigidity or guarding. Extremities: Moves all 4 extremities and no distress. Full range of motion. 2+ pulses and BCR in all 4 extremities. Neuro: Normal gait. 5 out of 5 strength in all 4 extremities. No facial droop. Skin examination: intact. no rashes. No petechia or purpura.       Medications   iopamidoL (ISOVUE 300) 61 % contrast injection 100 mL (has no administration in time range)   sodium chloride 0.9 % bolus infusion 1,000 mL (1,000 mL IntraVENous New Bag 4/22/20 2125)   morphine injection 4 mg (4 mg IntraVENous Given 4/22/20 2122)   ondansetron (Mykel [place]) injection 4 mg (4 mg IntraVENous Given 4/22/20 2122)       RESULTS:    Labs -   Labs Reviewed   CBC WITH AUTOMATED DIFF - Abnormal; Notable for the following components:       Result Value    RDW 14.6 (*)     All other components within normal limits   METABOLIC PANEL, COMPREHENSIVE - Abnormal; Notable for the following components:    Glucose 121 (*)     GFR est AA 53 (*)     GFR est non-AA 44 (*)     ALT (SGPT) 62 (*)     AST (SGOT) 44 (*)     Alk. phosphatase 133 (*)     Protein, total 8.4 (*)     Globulin 4.5 (*)     All other components within normal limits   URINALYSIS W/ RFLX MICROSCOPIC - Abnormal; Notable for the following components:    Glucose >1,000 (*)     Ketone TRACE (*)     All other components within normal limits   LIPASE       Radiologic Studies -  No results found. MEDICAL DECISION MAKING    Discussed patient not getting CT scan. Patient agrees. I have ordered morphine Zofran and fluids. Blood work obtained. Patient agrees with plan.  2200. Patient signed out to oncoming physician. Pending CT scan at this point. Diagnosis   Clinical Impression:   1. Abdominal pain, right lower quadrant    2. Non-intractable vomiting with nausea, unspecified vomiting type             currently in stable and improved condition. This chart was completed using Dragon, a dictation transcription service. Errors may have resulted from using this device.

## 2020-04-23 NOTE — ED NOTES
I have reviewed discharge instructions with the patient. The patient verbalized understanding. Pt discharged from ED ambulatory with steady gait noted , stable in no distress.

## 2020-04-23 NOTE — DISCHARGE INSTRUCTIONS
SPECIFIC PATIENT INSTRUCTIONS FROM THE PHYSICIAN WHO TREATED YOU IN THE ER TODAY:  1. Return if worse. 2. Follow up with your primary doctor in the next 1-2 days for outpatient reevaluation. 3. Norco for pain not controlled over-the-counter Tylenol. 4. Zofran as prescribed for nausea and/or vomiting. Patient Education        Abdominal Pain: Care Instructions  Your Care Instructions    Abdominal pain has many possible causes. Some aren't serious and get better on their own in a few days. Others need more testing and treatment. If your pain continues or gets worse, you need to be rechecked and may need more tests to find out what is wrong. You may need surgery to correct the problem. Don't ignore new symptoms, such as fever, nausea and vomiting, urination problems, pain that gets worse, and dizziness. These may be signs of a more serious problem. Your doctor may have recommended a follow-up visit in the next 8 to 12 hours. If you are not getting better, you may need more tests or treatment. The doctor has checked you carefully, but problems can develop later. If you notice any problems or new symptoms, get medical treatment right away. Follow-up care is a key part of your treatment and safety. Be sure to make and go to all appointments, and call your doctor if you are having problems. It's also a good idea to know your test results and keep a list of the medicines you take. How can you care for yourself at home? · Rest until you feel better. · To prevent dehydration, drink plenty of fluids, enough so that your urine is light yellow or clear like water. Choose water and other caffeine-free clear liquids until you feel better. If you have kidney, heart, or liver disease and have to limit fluids, talk with your doctor before you increase the amount of fluids you drink. · If your stomach is upset, eat mild foods, such as rice, dry toast or crackers, bananas, and applesauce.  Try eating several small meals instead of two or three large ones. · Wait until 48 hours after all symptoms have gone away before you have spicy foods, alcohol, and drinks that contain caffeine. · Do not eat foods that are high in fat. · Avoid anti-inflammatory medicines such as aspirin, ibuprofen (Advil, Motrin), and naproxen (Aleve). These can cause stomach upset. Talk to your doctor if you take daily aspirin for another health problem. When should you call for help? Call 911 anytime you think you may need emergency care. For example, call if:    · You passed out (lost consciousness).     · You pass maroon or very bloody stools.     · You vomit blood or what looks like coffee grounds.     · You have new, severe belly pain.    Call your doctor now or seek immediate medical care if:    · Your pain gets worse, especially if it becomes focused in one area of your belly.     · You have a new or higher fever.     · Your stools are black and look like tar, or they have streaks of blood.     · You have unexpected vaginal bleeding.     · You have symptoms of a urinary tract infection. These may include:  ? Pain when you urinate. ? Urinating more often than usual.  ? Blood in your urine.     · You are dizzy or lightheaded, or you feel like you may faint.    Watch closely for changes in your health, and be sure to contact your doctor if:    · You are not getting better after 1 day (24 hours). Where can you learn more? Go to http://betzy-didi.info/  Enter V644 in the search box to learn more about \"Abdominal Pain: Care Instructions. \"  Current as of: June 26, 2019Content Version: 12.4  © 0134-1209 [employer]. Care instructions adapted under license by [employer] (which disclaims liability or warranty for this information).  If you have questions about a medical condition or this instruction, always ask your healthcare professional. [address] any warranty or liability for your use of this information. Patient Education        Nausea and Vomiting: Care Instructions  Your Care Instructions    When you are nauseated, you may feel weak and sweaty and notice a lot of saliva in your mouth. Nausea often leads to vomiting. Most of the time you do not need to worry about nausea and vomiting, but they can be signs of other illnesses. Two common causes of nausea and vomiting are stomach flu and food poisoning. Nausea and vomiting from viral stomach flu will usually start to improve within 24 hours. Nausea and vomiting from food poisoning may last from 12 to 48 hours. The doctor has checked you carefully, but problems can develop later. If you notice any problems or new symptoms, get medical treatment right away. Follow-up care is a key part of your treatment and safety. Be sure to make and go to all appointments, and call your doctor if you are having problems. It's also a good idea to know your test results and keep a list of the medicines you take. How can you care for yourself at home? · To prevent dehydration, drink plenty of fluids, enough so that your urine is light yellow or clear like water. Choose water and other caffeine-free clear liquids until you feel better. If you have kidney, heart, or liver disease and have to limit fluids, talk with your doctor before you increase the amount of fluids you drink. · Rest in bed until you feel better. · When you are able to eat, try clear soups, mild foods, and liquids until all symptoms are gone for 12 to 48 hours. Other good choices include dry toast, crackers, cooked cereal, and gelatin dessert, such as Jell-O. When should you call for help? Call 911 anytime you think you may need emergency care. For example, call if:    · You passed out (lost consciousness).    Call your doctor now or seek immediate medical care if:    · You have symptoms of dehydration, such as:  ? Dry eyes and a dry mouth.   ? Passing only a little dark urine.  ? Feeling thirstier than usual.     · You have new or worsening belly pain.     · You have a new or higher fever.     · You vomit blood or what looks like coffee grounds.    Watch closely for changes in your health, and be sure to contact your doctor if:    · You have ongoing nausea and vomiting.     · Your vomiting is getting worse.     · Your vomiting lasts longer than 2 days.     · You are not getting better as expected. Where can you learn more? Go to http://betzy-didi.info/  Enter H591 in the search box to learn more about \"Nausea and Vomiting: Care Instructions. \"  Current as of: 2019Content Version: 12.4  © 5802-5611 [employer]. Care instructions adapted under license by [employer] (which disclaims liability or warranty for this information). If you have questions about a medical condition or this instruction, always ask your healthcare professional. [address] any warranty or liability for your use of this information. Embibe Activation    Thank you for requesting access to Embibe. Please follow the instructions below to securely access and download your online medical record. Embibe allows you to send messages to your doctor, view your test results, renew your prescriptions, schedule appointments, and more. How Do I Sign Up? In your internet browser, go to [email or web address]. Click on the First Time User? Click Here link in the Sign In box. You will see the New Member Sign Up page. Enter your Embibe Access Code exactly as it appears below. You will not need to use this code after you´ve completed the sign-up process. If you do not sign up before the expiration date, you must request a new code.     Embibe Access Code: [email or web address]  Expires: 3/28/2019  2:27 PM (This is the date your Embibe access code will )    Enter the last four digits of your Social Security Number (xxxx) and Date of Birth (mm/dd/yyyy) as indicated and click Submit. You will be taken to the next sign-up page. Create a Revl ID. This will be your Revl login ID and cannot be changed, so think of one that is secure and easy to remember. Create a Revl password. You can change your password at any time. Enter your Password Reset Question and Answer. This can be used at a later time if you forget your password. Enter your e-mail address. You will receive e-mail notification when new information is available in [address] Click Sign Up. You can now view and download portions of your medical record. Click the [employer] link to download a portable copy of your medical information. Additional Information    If you have questions, please visit the Frequently Asked Questions section of the Revl website at [email or web address] Remember, Revl is NOT to be used for urgent needs. For medical emergencies, dial 911.

## 2020-05-07 ENCOUNTER — PATIENT OUTREACH (OUTPATIENT)
Dept: CASE MANAGEMENT | Age: 60
End: 2020-05-07

## 2020-05-07 NOTE — PROGRESS NOTES
Patient contacted regarding COVID-19 risk and screening. Care Coordinator contacted the patient by telephone to perform follow-up assessment. Verified name and  with patient as identifiers. Patient has following risk factors of: asthma and diabetes. Symptoms reviewed with patient who verbalized the following symptoms: no new symptoms and no worsening symptoms. Due to no new or worsening symptoms encounter was not routed to provider for escalation. Education provided regarding infection prevention, and signs and symptoms of COVID-19 and when to seek medical attention with patient who verbalized understanding. Discussed exposure protocols and quarantine from [address] you at higher risk for severe illness  and given an opportunity for questions and concerns. The patient agrees to contact the COVID-19 hotline [phone] or PCP office for questions related to their healthcare. Care Coordinator provided contact information for future reference. From CDC: Are you at higher risk for severe illness?  Wash your hands often.  Avoid close contact (6 feet, which is about two arm lengths) with people who are sick.  Put distance between yourself and other people if COVID-19 is spreading in your community.  Clean and disinfect frequently touched surfaces.  Avoid all cruise travel and non-essential air travel.  Call your healthcare professional if you have concerns about COVID-19 and your underlying condition or if you are sick.     For more information on steps you can take to protect yourself, see CDC's How to Protect Yourself

## 2020-05-11 ENCOUNTER — HOSPITAL ENCOUNTER (EMERGENCY)
Age: 60
Discharge: HOME | End: 2020-05-11
Attending: EMERGENCY MEDICINE
Payer: COMMERCIAL

## 2020-05-11 VITALS
SYSTOLIC BLOOD PRESSURE: 109 MMHG | TEMPERATURE: 98 F | WEIGHT: 152 LBS | HEIGHT: 64 IN | BODY MASS INDEX: 25.95 KG/M2 | DIASTOLIC BLOOD PRESSURE: 75 MMHG | RESPIRATION RATE: 19 BRPM | HEART RATE: 95 BPM | OXYGEN SATURATION: 100 %

## 2020-05-11 DIAGNOSIS — R10.84 ABDOMINAL PAIN, GENERALIZED: Primary | ICD-10-CM

## 2020-05-11 LAB
ALBUMIN SERPL-MCNC: 3 G/DL (ref 3.4–5)
ALBUMIN/GLOB SERPL: 0.8 {RATIO} (ref 0.8–1.7)
ALP SERPL-CCNC: 114 U/L (ref 45–117)
ALT SERPL-CCNC: 21 U/L (ref 13–56)
ANION GAP SERPL CALC-SCNC: 6 MMOL/L (ref 3–18)
APPEARANCE UR: ABNORMAL
AST SERPL-CCNC: 9 U/L (ref 10–38)
BASOPHILS # BLD: 0 K/UL (ref 0–0.1)
BASOPHILS NFR BLD: 1 % (ref 0–2)
BILIRUB SERPL-MCNC: 0.2 MG/DL (ref 0.2–1)
BILIRUB UR QL: NEGATIVE
BUN SERPL-MCNC: 21 MG/DL (ref 7–18)
BUN/CREAT SERPL: 19 (ref 12–20)
CALCIUM SERPL-MCNC: 8.9 MG/DL (ref 8.5–10.1)
CHLORIDE SERPL-SCNC: 107 MMOL/L (ref 100–111)
CO2 SERPL-SCNC: 26 MMOL/L (ref 21–32)
COLOR UR: YELLOW
CREAT SERPL-MCNC: 1.08 MG/DL (ref 0.6–1.3)
DIFFERENTIAL METHOD BLD: ABNORMAL
EOSINOPHIL # BLD: 0.2 K/UL (ref 0–0.4)
EOSINOPHIL NFR BLD: 3 % (ref 0–5)
ERYTHROCYTE [DISTWIDTH] IN BLOOD BY AUTOMATED COUNT: 14.8 % (ref 11.6–14.5)
GLOBULIN SER CALC-MCNC: 3.7 G/DL (ref 2–4)
GLUCOSE SERPL-MCNC: 139 MG/DL (ref 74–99)
GLUCOSE UR STRIP.AUTO-MCNC: >1000 MG/DL
HCT VFR BLD AUTO: 36.5 % (ref 35–45)
HGB BLD-MCNC: 11.7 G/DL (ref 12–16)
HGB UR QL STRIP: NEGATIVE
KETONES UR QL STRIP.AUTO: NEGATIVE MG/DL
LEUKOCYTE ESTERASE UR QL STRIP.AUTO: NEGATIVE
LIPASE SERPL-CCNC: 131 U/L (ref 73–393)
LYMPHOCYTES # BLD: 1.8 K/UL (ref 0.9–3.6)
LYMPHOCYTES NFR BLD: 32 % (ref 21–52)
MCH RBC QN AUTO: 27 PG (ref 24–34)
MCHC RBC AUTO-ENTMCNC: 32.1 G/DL (ref 31–37)
MCV RBC AUTO: 84.1 FL (ref 74–97)
MONOCYTES # BLD: 0.6 K/UL (ref 0.05–1.2)
MONOCYTES NFR BLD: 10 % (ref 3–10)
NEUTS SEG # BLD: 3.1 K/UL (ref 1.8–8)
NEUTS SEG NFR BLD: 54 % (ref 40–73)
NITRITE UR QL STRIP.AUTO: NEGATIVE
PH UR STRIP: 5 [PH] (ref 5–8)
PLATELET # BLD AUTO: 284 K/UL (ref 135–420)
PMV BLD AUTO: 11 FL (ref 9.2–11.8)
POTASSIUM SERPL-SCNC: 3.5 MMOL/L (ref 3.5–5.5)
PROT SERPL-MCNC: 6.7 G/DL (ref 6.4–8.2)
PROT UR STRIP-MCNC: NEGATIVE MG/DL
RBC # BLD AUTO: 4.34 M/UL (ref 4.2–5.3)
SODIUM SERPL-SCNC: 139 MMOL/L (ref 136–145)
SP GR UR REFRACTOMETRY: 1.03 (ref 1–1.03)
UROBILINOGEN UR QL STRIP.AUTO: 0.2 EU/DL (ref 0.2–1)
WBC # BLD AUTO: 5.8 K/UL (ref 4.6–13.2)

## 2020-05-11 PROCEDURE — 99283 EMERGENCY DEPT VISIT LOW MDM: CPT

## 2020-05-11 PROCEDURE — 96375 TX/PRO/DX INJ NEW DRUG ADDON: CPT

## 2020-05-11 PROCEDURE — 80053 COMPREHEN METABOLIC PANEL: CPT

## 2020-05-11 PROCEDURE — 85025 COMPLETE CBC W/AUTO DIFF WBC: CPT

## 2020-05-11 PROCEDURE — 96374 THER/PROPH/DIAG INJ IV PUSH: CPT

## 2020-05-11 PROCEDURE — 74011250636 HC RX REV CODE- 250/636: Performed by: EMERGENCY MEDICINE

## 2020-05-11 PROCEDURE — 83690 ASSAY OF LIPASE: CPT

## 2020-05-11 PROCEDURE — 81003 URINALYSIS AUTO W/O SCOPE: CPT

## 2020-05-11 RX ORDER — MORPHINE SULFATE 4 MG/ML
4 INJECTION, SOLUTION INTRAMUSCULAR; INTRAVENOUS
Status: COMPLETED | OUTPATIENT
Start: 2020-05-11 | End: 2020-05-11

## 2020-05-11 RX ORDER — ONDANSETRON 2 MG/ML
4 INJECTION INTRAMUSCULAR; INTRAVENOUS
Status: COMPLETED | OUTPATIENT
Start: 2020-05-11 | End: 2020-05-11

## 2020-05-11 RX ADMIN — ONDANSETRON 4 MG: 2 INJECTION INTRAMUSCULAR; INTRAVENOUS at 14:23

## 2020-05-11 RX ADMIN — SODIUM CHLORIDE 1000 ML: 900 INJECTION, SOLUTION INTRAVENOUS at 14:23

## 2020-05-11 RX ADMIN — MORPHINE SULFATE 4 MG: 4 INJECTION, SOLUTION INTRAMUSCULAR; INTRAVENOUS at 14:23

## 2020-05-11 NOTE — ED PROVIDER NOTES
EMERGENCY DEPARTMENT HISTORY AND PHYSICAL EXAM    2:20 PM      Date: 5/11/2020  Patient Name: Kel Lopez    History of Presenting Illness     Chief Complaint   Patient presents with    Abdominal Pain    Shortness of Breath         History Provided By: Patient      Additional History (Context): Kel Lopez is a 61 y.o. female who presents with complaints of abdominal pain increased asthma. Patient states that she has had increased abdominal pain since last night she denies any vomiting she has had some nausea she has continued to have loose diarrhea stools she denies any blood or melena in the stools she denies any fevers or chills. Patient denies any recent contact for COVID. Social history is negative for tobacco alcohol patient is status post exploratory laparotomy in the past for perforated viscus well as small bowel obstruction for ED visits in the past for abdominal pain. PCP: Dustin Chin MD      Current Outpatient Medications   Medication Sig Dispense Refill    ondansetron hcl (Zofran) 4 mg tablet Take 2 Tabs by mouth every eight (8) hours as needed for Nausea. 12 Tab 0    acetaminophen (TYLENOL) 325 mg tablet Take 2 Tabs by mouth every four (4) hours as needed for Pain. 20 Tab 0    metFORMIN (GLUMETZA ER) 500 mg TG24 24 hour tablet Take 1,000 mg by mouth two (2) times a day. take 1 tablet by mouth twice a day 120 Tab 11    amLODIPine (NORVASC) 5 mg tablet Take 1 Tab by mouth daily. 30 Tab 0    albuterol sulfate (PROAIR RESPICLICK) 90 mcg/actuation aepb Take 2 Puffs by inhalation every four (4) hours as needed for Wheezing, Shortness of Breath or Cough. 1 Inhaler 0    ipratropium (ATROVENT) 0.02 % soln 2.5 mL by Nebulization route every six (6) hours as needed for Cough. 30 Vial 1    albuterol (PROVENTIL VENTOLIN) 2.5 mg /3 mL (0.083 %) nebu 3 mL by Nebulization route every six (6) hours as needed for Wheezing.  30 Nebule 1    ondansetron (ZOFRAN ODT) 4 mg disintegrating tablet Take 1-2 tablets every 6-8 hours as needed for nausea and vomiting. 10 Tab 0    dexAMETHasone (DECADRON) 2 mg tablet Take 10 mg on the day after tomorrow 5 Tab 0    budesonide-formoterol (SYMBICORT) 160-4.5 mcg/actuation HFAA Take 2 Puffs by inhalation two (2) times a day. 1 Inhaler 2    doxepin (SINEQUAN) 25 mg capsule Take 1 Cap by mouth nightly. 30 Cap 2    polyethylene glycol (MIRALAX) 17 gram/dose powder Take 17 g by mouth daily. 1 tablespoon with 8 oz of water daily 595 g 0    multivitamin (ONE A DAY) tablet Take 1 Tab by mouth daily.  ALLERGY RELIEF, CETIRIZINE, 10 mg tablet take 1 tablet by mouth once daily 90 Tab 3    bisoprolol-hydroCHLOROthiazide (ZIAC) 2.5-6.25 mg per tablet Take 1 Tab by mouth daily. 90 Tab 3    omeprazole (PRILOSEC) 10 mg capsule take 1 capsule by mouth once daily 30 Cap 11    folic acid (FOLVITE) 1 mg tablet Take  by mouth daily.       glucose blood VI test strips (ONETOUCH ULTRA TEST) strip Check blood glucose as directed 100 Strip 1       Past History     Past Medical History:  Past Medical History:   Diagnosis Date    Abdominal adhesions     Adnexal cyst 7/30/2019    Left    Anemia     Asthma     Chronic pelvic pain in female 7/30/2019    Diabetes mellitus     Dyskinesia     bilateral    Essential hypertension     GERD (gastroesophageal reflux disease)     Hypertension     Menopause     Microhematuria     Rheumatoid arteritis ([address] 2018    Stool color black        Past Surgical History:  Past Surgical History:   Procedure Laterality Date    COLONOSCOPY N/A 1/21/2019    COLONOSCOPY performed by [facility] at [facility] ENDOSCOPY    HX CHOLECYSTECTOMY  6/28/10    HX COLONOSCOPY      HX ENDOSCOPY      HX HERNIA REPAIR      HX HYSTERECTOMY  1989    Fibroids    HX KNEE REPLACEMENT Left     HX KNEE REPLACEMENT Right 06/2018    HX OOPHORECTOMY  12/2000    HX ORTHOPAEDIC Right     ankle- multiple surgeries    HX SMALL BOWEL RESECTION  12/2000    HX SMALL BOWEL RESECTION  02/21/2017    Dr. Shayna Malagon         Family History:  Family History   Problem Relation Age of Onset    Hypertension Other         parent    Breast Cancer Other 21    Heart Disease Father     Diabetes Father     Lung Disease Father     Diabetes Mother     Hypertension Other         sibling    Diabetes Other         parent    Kidney Disease Brother     Diabetes Brother     Lung Disease Brother        Social History:  Social History     Tobacco Use    Smoking status: Never Smoker    Smokeless tobacco: Never Used   Substance Use Topics    Alcohol use: No    Drug use: No       Allergies: Allergies   Allergen Reactions    Macrodantin [Nitrofurantoin Macrocrystalline] Itching and Other (comments)    Tape [Adhesive] Other (comments)     Paper tape-- feels like burning skin  Burned skin when had wound vac         Review of Systems       Review of Systems   Constitutional: Positive for appetite change. Negative for chills, diaphoresis and fever. HENT: Negative for congestion. Eyes: Positive for photophobia. Negative for visual disturbance. Respiratory: Negative for cough, chest tightness and shortness of breath. Cardiovascular: Negative for chest pain. Gastrointestinal: Positive for abdominal pain, diarrhea and nausea. Negative for vomiting. Genitourinary: Negative for dysuria. Musculoskeletal: Negative for back pain. Skin: Negative for rash. Neurological: Negative for dizziness, syncope and weakness. All other systems reviewed and are negative. Physical Exam     Visit Vitals  /75   Pulse 95   Temp 98 °F (36.7 °C)   Resp 19   Ht 5' 4\" (1.626 m)   Wt 68.9 kg (152 lb)   SpO2 100%   BMI 26.09 kg/m²       Physical Exam  Vitals signs and nursing note reviewed. Constitutional:       Appearance: She is well-developed. She is not toxic-appearing. HENT:      Head: Normocephalic and atraumatic. Eyes:      General: No scleral icterus. Conjunctiva/sclera: Conjunctivae normal.      Pupils: Pupils are equal, round, and reactive to light. Neck:      Musculoskeletal: Normal range of motion and neck supple. Cardiovascular:      Rate and Rhythm: Normal rate and regular rhythm. Heart sounds: Normal heart sounds. No murmur. Pulmonary:      Effort: Pulmonary effort is normal. No respiratory distress. Breath sounds: Normal breath sounds. Abdominal:      General: Bowel sounds are normal. There is no distension. Palpations: Abdomen is soft. Tenderness: There is generalized abdominal tenderness. There is no guarding or rebound. Hernia: No hernia is present. Comments: Mild diffuse tenderness no guarding no distention no   increased tympany   Lymphadenopathy:      Cervical: No cervical adenopathy. Skin:     General: Skin is warm and dry. Findings: No rash. Neurological:      Mental Status: She is alert and oriented to person, place, and time.       Coordination: Coordination normal.   Psychiatric:         Behavior: Behavior normal.           Diagnostic Study Results     Labs -  Recent Results (from the past 12 hour(s))   URINALYSIS W/ RFLX MICROSCOPIC    Collection Time: 05/11/20  2:00 PM   Result Value Ref Range    Color YELLOW      Appearance CLOUDY      Specific gravity 1.026 1.005 - 1.030      pH (UA) 5.0 5.0 - 8.0      Protein Negative NEG mg/dL    Glucose >1,000 (A) NEG mg/dL    Ketone Negative NEG mg/dL    Bilirubin Negative NEG      Blood Negative NEG      Urobilinogen 0.2 0.2 - 1.0 EU/dL    Nitrites Negative NEG      Leukocyte Esterase Negative NEG     CBC WITH AUTOMATED DIFF    Collection Time: 05/11/20  3:19 PM   Result Value Ref Range    WBC 5.8 4.6 - 13.2 K/uL    RBC 4.34 4.20 - 5.30 M/uL    HGB 11.7 (L) 12.0 - 16.0 g/dL    HCT 36.5 35.0 - 45.0 %    MCV 84.1 74.0 - 97.0 FL    MCH 27.0 24.0 - 34.0 PG    MCHC 32.1 31.0 - 37.0 g/dL    RDW 14.8 (H) 11.6 - 14.5 % PLATELET 932 978 - 488 K/uL    MPV 11.0 9.2 - 11.8 FL    NEUTROPHILS 54 40 - 73 %    LYMPHOCYTES 32 21 - 52 %    MONOCYTES 10 3 - 10 %    EOSINOPHILS 3 0 - 5 %    BASOPHILS 1 0 - 2 %    ABS. NEUTROPHILS 3.1 1.8 - 8.0 K/UL    ABS. LYMPHOCYTES 1.8 0.9 - 3.6 K/UL    ABS. MONOCYTES 0.6 0.05 - 1.2 K/UL    ABS. EOSINOPHILS 0.2 0.0 - 0.4 K/UL    ABS. BASOPHILS 0.0 0.0 - 0.1 K/UL    DF AUTOMATED     METABOLIC PANEL, COMPREHENSIVE    Collection Time: 05/11/20  3:19 PM   Result Value Ref Range    Sodium 139 136 - 145 mmol/L    Potassium 3.5 3.5 - 5.5 mmol/L    Chloride 107 100 - 111 mmol/L    CO2 26 21 - 32 mmol/L    Anion gap 6 3.0 - 18 mmol/L    Glucose 139 (H) 74 - 99 mg/dL    BUN 21 (H) 7.0 - 18 MG/DL    Creatinine 1.08 0.6 - 1.3 MG/DL    BUN/Creatinine ratio 19 12 - 20      GFR est AA >60 >60 ml/min/1.73m2    GFR est non-AA 52 (L) >60 ml/min/1.73m2    Calcium 8.9 8.5 - 10.1 MG/DL    Bilirubin, total 0.2 0.2 - 1.0 MG/DL    ALT (SGPT) 21 13 - 56 U/L    AST (SGOT) 9 (L) 10 - 38 U/L    Alk. phosphatase 114 45 - 117 U/L    Protein, total 6.7 6.4 - 8.2 g/dL    Albumin 3.0 (L) 3.4 - 5.0 g/dL    Globulin 3.7 2.0 - 4.0 g/dL    A-G Ratio 0.8 0.8 - 1.7     LIPASE    Collection Time: 05/11/20  3:19 PM   Result Value Ref Range    Lipase 131 73 - 393 U/L       Radiologic Studies -   No orders to display         Medical Decision Making   I am the first provider for this patient. I reviewed the vital signs, available nursing notes, past medical history, past surgical history, family history and social history. Vital Signs-Reviewed the patient's vital signs.       EKG:    Records Reviewed: Nursing Notes and Old Medical Records (Time of Review: 2:20 PM)    ED Course: Progress Notes, Reevaluation, and Consults:      Provider Notes (Medical Decision Making):   MDM  Number of Diagnoses or Management Options  Abdominal pain, generalized:   Diagnosis management comments: Well-known for multiple emergency department visits for abdominal pain she recently had a CT on 4/22 check labs reevaluate patient does not have a small bowel obstruction at present with no distention with continued diarrhea not reimage unless lab abnormality    4:22 PM  Department states she is ready for discharge       Amount and/or Complexity of Data Reviewed  Clinical lab tests: ordered and reviewed    Risk of Complications, Morbidity, and/or Mortality  Presenting problems: high  Diagnostic procedures: moderate  Management options: moderate            Critical Care Time:       Diagnosis     Clinical Impression:   1. Abdominal pain, generalized        Disposition: Home    Follow-up Information     Follow up With Specialties Details Why [address] [facility] EMERGENCY DEPT Emergency Medicine  As needed, If symptoms worsen [address]  [phone]    Ani Arce MD Family Practice, Internal Medicine  for ED follow up appointment  [address] [facility]  [address] [place] [address]  [phone]             Patient's Medications   Start Taking    No medications on file   Continue Taking    ACETAMINOPHEN (TYLENOL) 325 MG TABLET    Take 2 Tabs by mouth every four (4) hours as needed for Pain. ALBUTEROL (PROVENTIL VENTOLIN) 2.5 MG /3 ML (0.083 %) NEBU    3 mL by Nebulization route every six (6) hours as needed for Wheezing. ALBUTEROL SULFATE (PROAIR RESPICLICK) 90 MCG/ACTUATION AEPB    Take 2 Puffs by inhalation every four (4) hours as needed for Wheezing, Shortness of Breath or Cough. ALLERGY RELIEF, CETIRIZINE, 10 MG TABLET    take 1 tablet by mouth once daily    AMLODIPINE (NORVASC) 5 MG TABLET    Take 1 Tab by mouth daily. BISOPROLOL-HYDROCHLOROTHIAZIDE (ZIAC) 2.5-6.25 MG PER TABLET    Take 1 Tab by mouth daily. BUDESONIDE-FORMOTEROL (SYMBICORT) 160-4.5 MCG/ACTUATION HFAA    Take 2 Puffs by inhalation two (2) times a day.     DEXAMETHASONE (DECADRON) 2 MG TABLET    Take 10 mg on the day after tomorrow    DOXEPIN (SINEQUAN) 25 MG CAPSULE Take 1 Cap by mouth nightly. FOLIC ACID (FOLVITE) 1 MG TABLET    Take  by mouth daily. GLUCOSE BLOOD VI TEST STRIPS (ONETOUCH ULTRA TEST) STRIP    Check blood glucose as directed    IPRATROPIUM (ATROVENT) 0.02 % SOLN    2.5 mL by Nebulization route every six (6) hours as needed for Cough. METFORMIN (GLUMETZA ER) 500 MG TG24 24 HOUR TABLET    Take 1,000 mg by mouth two (2) times a day. take 1 tablet by mouth twice a day    MULTIVITAMIN (ONE A DAY) TABLET    Take 1 Tab by mouth daily. OMEPRAZOLE (PRILOSEC) 10 MG CAPSULE    take 1 capsule by mouth once daily    ONDANSETRON (ZOFRAN ODT) 4 MG DISINTEGRATING TABLET    Take 1-2 tablets every 6-8 hours as needed for nausea and vomiting. ONDANSETRON HCL (ZOFRAN) 4 MG TABLET    Take 2 Tabs by mouth every eight (8) hours as needed for Nausea. POLYETHYLENE GLYCOL (MIRALAX) 17 GRAM/DOSE POWDER    Take 17 g by mouth daily. 1 tablespoon with 8 oz of water daily   These Medications have changed    No medications on file   Stop Taking    No medications on file     _______________________________    Please note that this dictation was completed with [employer], the computer voice recognition software. Quite often unanticipated grammatical, syntax, homophones, and other interpretive errors are inadvertently transcribed by the computer software. Please disregard these errors. Please excuse any errors that have escaped final proofreading.

## 2020-05-11 NOTE — ED TRIAGE NOTES
Pt reports abdominal pain and vomiting since Saturday and today she noticed some SOB. Pt denies any cough and no fever noted upon triage. Pt also reports \"feeling drained\" and loose stools. Pt states she has been around her nephew that traveled to [facility] this weekend.

## 2020-05-11 NOTE — DISCHARGE INSTRUCTIONS

## 2020-05-12 ENCOUNTER — PATIENT OUTREACH (OUTPATIENT)
Dept: FAMILY MEDICINE CLINIC | Facility: CLINIC | Age: 60
End: 2020-05-12

## 2020-05-12 NOTE — PROGRESS NOTES
Patient contacted regarding recent discharge and COVID-19 risk   Care Coordinator contacted the patient by telephone to perform post discharge assessment. Verified name and  with patient as identifiers. Patient has following risk factors of: asthma, diabetes and HTN. Care Coordinator reviewed discharge instructions, medical action plan and red flags related to discharge diagnosis. Reviewed and educated them on any new and changed medications related to discharge diagnosis. Education provided regarding infection prevention, and signs and symptoms of COVID-19 and when to seek medical attention with patient who verbalized understanding. Discussed exposure protocols and quarantine from [address] you at higher risk for severe illness  and given an opportunity for questions and concerns. The patient agrees to contact  PCP office for questions related to their healthcare. Care Coordinator provided contact information for future reference. From CDC: Are you at higher risk for severe illness?  Wash your hands often.  Avoid close contact (6 feet, which is about two arm lengths) with people who are sick.  Put distance between yourself and other people if COVID-19 is spreading in your community.  Clean and disinfect frequently touched surfaces.  Avoid all cruise travel and non-essential air travel.  Call your healthcare professional if you have concerns about COVID-19 and your underlying condition or if you are sick. For more information on steps you can take to protect yourself, see CDC's How to Protect Yourself      Patient/family/caregiver given information for [employer] [facility] and agrees to enroll no  Patient's preferred e-mail:  n/a  Patient's preferred phone number: n/a  Based on Loop alert triggers, patient will be contacted by nurse care manager for worsening symptoms. Plan for follow-up call in 7-14 days based on severity of symptoms and risk factors.

## 2020-05-26 ENCOUNTER — PATIENT OUTREACH (OUTPATIENT)
Dept: FAMILY MEDICINE CLINIC | Facility: CLINIC | Age: 60
End: 2020-05-26

## 2020-05-26 NOTE — PROGRESS NOTES
Date/Time:  5/26/2020 11:37 AM  Attempted to reach patient by telephone. Left HIPPA compliant message requesting a return call. Will attempt to reach patient again.

## 2020-05-27 ENCOUNTER — HOSPITAL ENCOUNTER (EMERGENCY)
Age: 60
Discharge: HOME | End: 2020-05-27
Attending: EMERGENCY MEDICINE
Payer: COMMERCIAL

## 2020-05-27 ENCOUNTER — APPOINTMENT (OUTPATIENT)
Dept: CT IMAGING | Age: 60
End: 2020-05-27
Attending: PHYSICIAN ASSISTANT
Payer: COMMERCIAL

## 2020-05-27 VITALS
SYSTOLIC BLOOD PRESSURE: 140 MMHG | TEMPERATURE: 98.6 F | RESPIRATION RATE: 18 BRPM | DIASTOLIC BLOOD PRESSURE: 88 MMHG | WEIGHT: 150 LBS | HEIGHT: 64 IN | OXYGEN SATURATION: 100 % | BODY MASS INDEX: 25.61 KG/M2 | HEART RATE: 88 BPM

## 2020-05-27 DIAGNOSIS — K52.9 ENTERITIS: Primary | ICD-10-CM

## 2020-05-27 LAB
ALBUMIN SERPL-MCNC: 3.5 G/DL (ref 3.4–5)
ALBUMIN/GLOB SERPL: 0.8 {RATIO} (ref 0.8–1.7)
ALP SERPL-CCNC: 157 U/L (ref 45–117)
ALT SERPL-CCNC: 23 U/L (ref 13–56)
ANION GAP SERPL CALC-SCNC: 8 MMOL/L (ref 3–18)
APPEARANCE UR: CLEAR
AST SERPL-CCNC: 13 U/L (ref 10–38)
BASOPHILS # BLD: 0 K/UL (ref 0–0.1)
BASOPHILS NFR BLD: 0 % (ref 0–2)
BILIRUB SERPL-MCNC: 0.2 MG/DL (ref 0.2–1)
BILIRUB UR QL: NEGATIVE
BUN SERPL-MCNC: 17 MG/DL (ref 7–18)
BUN/CREAT SERPL: 14 (ref 12–20)
CALCIUM SERPL-MCNC: 9.1 MG/DL (ref 8.5–10.1)
CHLORIDE SERPL-SCNC: 103 MMOL/L (ref 100–111)
CK MB CFR SERPL CALC: NORMAL % (ref 0–4)
CK MB SERPL-MCNC: <1 NG/ML (ref 5–25)
CK SERPL-CCNC: 43 U/L (ref 26–192)
CO2 SERPL-SCNC: 29 MMOL/L (ref 21–32)
COLOR UR: YELLOW
CREAT SERPL-MCNC: 1.21 MG/DL (ref 0.6–1.3)
DIFFERENTIAL METHOD BLD: ABNORMAL
EOSINOPHIL # BLD: 0.1 K/UL (ref 0–0.4)
EOSINOPHIL NFR BLD: 1 % (ref 0–5)
ERYTHROCYTE [DISTWIDTH] IN BLOOD BY AUTOMATED COUNT: 14.8 % (ref 11.6–14.5)
GLOBULIN SER CALC-MCNC: 4.2 G/DL (ref 2–4)
GLUCOSE SERPL-MCNC: 259 MG/DL (ref 74–99)
GLUCOSE UR STRIP.AUTO-MCNC: >1000 MG/DL
HCT VFR BLD AUTO: 43 % (ref 35–45)
HGB BLD-MCNC: 13.5 G/DL (ref 12–16)
HGB UR QL STRIP: NEGATIVE
KETONES UR QL STRIP.AUTO: NEGATIVE MG/DL
LEUKOCYTE ESTERASE UR QL STRIP.AUTO: NEGATIVE
LIPASE SERPL-CCNC: 186 U/L (ref 73–393)
LYMPHOCYTES # BLD: 1.6 K/UL (ref 0.9–3.6)
LYMPHOCYTES NFR BLD: 23 % (ref 21–52)
MCH RBC QN AUTO: 26.8 PG (ref 24–34)
MCHC RBC AUTO-ENTMCNC: 31.4 G/DL (ref 31–37)
MCV RBC AUTO: 85.3 FL (ref 74–97)
MONOCYTES # BLD: 0.5 K/UL (ref 0.05–1.2)
MONOCYTES NFR BLD: 7 % (ref 3–10)
NEUTS SEG # BLD: 4.7 K/UL (ref 1.8–8)
NEUTS SEG NFR BLD: 69 % (ref 40–73)
NITRITE UR QL STRIP.AUTO: NEGATIVE
PH UR STRIP: 5.5 [PH] (ref 5–8)
PLATELET # BLD AUTO: 323 K/UL (ref 135–420)
PMV BLD AUTO: 10.8 FL (ref 9.2–11.8)
POTASSIUM SERPL-SCNC: 3.8 MMOL/L (ref 3.5–5.5)
PROT SERPL-MCNC: 7.7 G/DL (ref 6.4–8.2)
PROT UR STRIP-MCNC: NEGATIVE MG/DL
RBC # BLD AUTO: 5.04 M/UL (ref 4.2–5.3)
SODIUM SERPL-SCNC: 140 MMOL/L (ref 136–145)
SP GR UR REFRACTOMETRY: 1.03 (ref 1–1.03)
TROPONIN I SERPL-MCNC: <0.02 NG/ML (ref 0–0.04)
UROBILINOGEN UR QL STRIP.AUTO: 0.2 EU/DL (ref 0.2–1)
WBC # BLD AUTO: 6.9 K/UL (ref 4.6–13.2)

## 2020-05-27 PROCEDURE — 83690 ASSAY OF LIPASE: CPT

## 2020-05-27 PROCEDURE — 99284 EMERGENCY DEPT VISIT MOD MDM: CPT

## 2020-05-27 PROCEDURE — 74011636320 HC RX REV CODE- 636/320: Performed by: EMERGENCY MEDICINE

## 2020-05-27 PROCEDURE — 96375 TX/PRO/DX INJ NEW DRUG ADDON: CPT

## 2020-05-27 PROCEDURE — 96361 HYDRATE IV INFUSION ADD-ON: CPT

## 2020-05-27 PROCEDURE — 74177 CT ABD & PELVIS W/CONTRAST: CPT

## 2020-05-27 PROCEDURE — 96374 THER/PROPH/DIAG INJ IV PUSH: CPT

## 2020-05-27 PROCEDURE — 85025 COMPLETE CBC W/AUTO DIFF WBC: CPT

## 2020-05-27 PROCEDURE — 80053 COMPREHEN METABOLIC PANEL: CPT

## 2020-05-27 PROCEDURE — 82550 ASSAY OF CK (CPK): CPT

## 2020-05-27 PROCEDURE — 74011250637 HC RX REV CODE- 250/637: Performed by: PHYSICIAN ASSISTANT

## 2020-05-27 PROCEDURE — 93005 ELECTROCARDIOGRAM TRACING: CPT

## 2020-05-27 PROCEDURE — 81003 URINALYSIS AUTO W/O SCOPE: CPT

## 2020-05-27 PROCEDURE — 96376 TX/PRO/DX INJ SAME DRUG ADON: CPT

## 2020-05-27 PROCEDURE — 74011250636 HC RX REV CODE- 250/636: Performed by: PHYSICIAN ASSISTANT

## 2020-05-27 RX ORDER — ONDANSETRON 2 MG/ML
4 INJECTION INTRAMUSCULAR; INTRAVENOUS
Status: COMPLETED | OUTPATIENT
Start: 2020-05-27 | End: 2020-05-27

## 2020-05-27 RX ORDER — MORPHINE SULFATE 2 MG/ML
2 INJECTION, SOLUTION INTRAMUSCULAR; INTRAVENOUS
Status: COMPLETED | OUTPATIENT
Start: 2020-05-27 | End: 2020-05-27

## 2020-05-27 RX ORDER — METRONIDAZOLE 250 MG/1
500 TABLET ORAL
Status: COMPLETED | OUTPATIENT
Start: 2020-05-27 | End: 2020-05-27

## 2020-05-27 RX ORDER — MORPHINE SULFATE 4 MG/ML
2 INJECTION, SOLUTION INTRAMUSCULAR; INTRAVENOUS
Status: COMPLETED | OUTPATIENT
Start: 2020-05-27 | End: 2020-05-27

## 2020-05-27 RX ORDER — METRONIDAZOLE 500 MG/1
500 TABLET ORAL 2 TIMES DAILY
Qty: 14 TAB | Refills: 0 | Status: SHIPPED | OUTPATIENT
Start: 2020-05-27 | End: 2020-06-03

## 2020-05-27 RX ORDER — CIPROFLOXACIN 500 MG/1
500 TABLET ORAL 2 TIMES DAILY
Qty: 14 TAB | Refills: 0 | Status: SHIPPED | OUTPATIENT
Start: 2020-05-27 | End: 2020-06-04 | Stop reason: ALTCHOICE

## 2020-05-27 RX ORDER — CIPROFLOXACIN 500 MG/1
500 TABLET ORAL
Status: COMPLETED | OUTPATIENT
Start: 2020-05-27 | End: 2020-05-27

## 2020-05-27 RX ORDER — ONDANSETRON 4 MG/1
4 TABLET, ORALLY DISINTEGRATING ORAL
Qty: 10 TAB | Refills: 0 | OUTPATIENT
Start: 2020-05-27 | End: 2020-06-29

## 2020-05-27 RX ADMIN — ONDANSETRON 4 MG: 2 INJECTION INTRAMUSCULAR; INTRAVENOUS at 17:51

## 2020-05-27 RX ADMIN — MORPHINE SULFATE 2 MG: 2 INJECTION, SOLUTION INTRAMUSCULAR; INTRAVENOUS at 17:51

## 2020-05-27 RX ADMIN — SODIUM CHLORIDE 1000 ML: 900 INJECTION, SOLUTION INTRAVENOUS at 17:49

## 2020-05-27 RX ADMIN — METRONIDAZOLE 500 MG: 250 TABLET ORAL at 20:56

## 2020-05-27 RX ADMIN — IOPAMIDOL 94 ML: 612 INJECTION, SOLUTION INTRAVENOUS at 19:28

## 2020-05-27 RX ADMIN — MORPHINE SULFATE 2 MG: 4 INJECTION, SOLUTION INTRAMUSCULAR; INTRAVENOUS at 20:26

## 2020-05-27 RX ADMIN — CIPROFLOXACIN HYDROCHLORIDE 500 MG: 500 TABLET, FILM COATED ORAL at 20:56

## 2020-05-27 NOTE — ED PROVIDER NOTES
EMERGENCY DEPARTMENT HISTORY AND PHYSICAL EXAM      Date: 5/27/2020  Patient Name: Atul Valentin    History of Presenting Illness     Chief Complaint   Patient presents with    Abdominal Pain    Diarrhea    Decreased Appetite       History Provided By: Patient    HPI: Atul Valentin, 61 y.o. female PMHx significant for anemia, asthma, DM, hypertension, GERD, adnexal cyst, RA presents ambulatory to the ED with cc of multiple bouts of NBNB emesis x 3 days with assoc nonbloody diarrhea. Patient reports intermittent sharp periumbilical abdominal pain worsening over the past day. Denies urinary symptoms. Patient reports history of SBO. Patient states she is passing gas. Last BM: 2 hours ago. Patient has not taken anything for symptoms. Denies aggravating or alleviating symptoms. There are no other complaints, changes, or physical findings at this time. PCP: Kalyan Fong MD    No current facility-administered medications on file prior to encounter. Current Outpatient Medications on File Prior to Encounter   Medication Sig Dispense Refill    ondansetron hcl (Zofran) 4 mg tablet Take 2 Tabs by mouth every eight (8) hours as needed for Nausea. 12 Tab 0    acetaminophen (TYLENOL) 325 mg tablet Take 2 Tabs by mouth every four (4) hours as needed for Pain. 20 Tab 0    metFORMIN (GLUMETZA ER) 500 mg TG24 24 hour tablet Take 1,000 mg by mouth two (2) times a day. take 1 tablet by mouth twice a day 120 Tab 11    amLODIPine (NORVASC) 5 mg tablet Take 1 Tab by mouth daily. 30 Tab 0    albuterol sulfate (PROAIR RESPICLICK) 90 mcg/actuation aepb Take 2 Puffs by inhalation every four (4) hours as needed for Wheezing, Shortness of Breath or Cough. 1 Inhaler 0    ipratropium (ATROVENT) 0.02 % soln 2.5 mL by Nebulization route every six (6) hours as needed for Cough.  30 Vial 1    albuterol (PROVENTIL VENTOLIN) 2.5 mg /3 mL (0.083 %) nebu 3 mL by Nebulization route every six (6) hours as needed for Wheezing. 30 Nebule 1    dexAMETHasone (DECADRON) 2 mg tablet Take 10 mg on the day after tomorrow 5 Tab 0    budesonide-formoterol (SYMBICORT) 160-4.5 mcg/actuation HFAA Take 2 Puffs by inhalation two (2) times a day. 1 Inhaler 2    doxepin (SINEQUAN) 25 mg capsule Take 1 Cap by mouth nightly. 30 Cap 2    polyethylene glycol (MIRALAX) 17 gram/dose powder Take 17 g by mouth daily. 1 tablespoon with 8 oz of water daily 595 g 0    multivitamin (ONE A DAY) tablet Take 1 Tab by mouth daily.  ALLERGY RELIEF, CETIRIZINE, 10 mg tablet take 1 tablet by mouth once daily 90 Tab 3    bisoprolol-hydroCHLOROthiazide (ZIAC) 2.5-6.25 mg per tablet Take 1 Tab by mouth daily. 90 Tab 3    omeprazole (PRILOSEC) 10 mg capsule take 1 capsule by mouth once daily 30 Cap 11    folic acid (FOLVITE) 1 mg tablet Take  by mouth daily.       glucose blood VI test strips (ONETOUCH ULTRA TEST) strip Check blood glucose as directed 100 Strip 1       Past History     Past Medical History:  Past Medical History:   Diagnosis Date    Abdominal adhesions     Adnexal cyst 7/30/2019    Left    Anemia     Asthma     Chronic pelvic pain in female 7/30/2019    Diabetes mellitus     Dyskinesia     bilateral    Essential hypertension     GERD (gastroesophageal reflux disease)     Hypertension     Menopause     Microhematuria     Rheumatoid arteritis ([facility] [address] 2018    Stool color black        Past Surgical History:  Past Surgical History:   Procedure Laterality Date    COLONOSCOPY N/A 1/21/2019    COLONOSCOPY performed by Judi Garvin [facility] at [facility] ENDOSCOPY    HX CHOLECYSTECTOMY  6/28/10    HX COLONOSCOPY      HX ENDOSCOPY      HX HERNIA REPAIR      HX HYSTERECTOMY  1989    Fibroids    HX KNEE REPLACEMENT Left     HX KNEE REPLACEMENT Right 06/2018    HX OOPHORECTOMY  12/2000    HX ORTHOPAEDIC Right     ankle- multiple surgeries    HX SMALL BOWEL RESECTION  12/2000    HX SMALL BOWEL RESECTION  02/21/2017    Dr. Sherry Flores TISSUE LOCALIZATION-EXCISION         Family History:  Family History   Problem Relation Age of Onset    Hypertension Other         parent    Breast Cancer Other 21    Heart Disease Father     Diabetes Father     Lung Disease Father     Diabetes Mother     Hypertension Other         sibling    Diabetes Other         parent    Kidney Disease Brother     Diabetes Brother     Lung Disease Brother        Social History:  Social History     Tobacco Use    Smoking status: Never Smoker    Smokeless tobacco: Never Used   Substance Use Topics    Alcohol use: No    Drug use: No       Allergies: Allergies   Allergen Reactions    Macrodantin [Nitrofurantoin Macrocrystalline] Itching and Other (comments)    Tape [Adhesive] Other (comments)     Paper tape-- feels like burning skin  Burned skin when had wound vac         Review of Systems   Review of Systems   Constitutional: Negative for chills and fever. Respiratory: Negative for shortness of breath. Cardiovascular: Negative for chest pain. Gastrointestinal: Positive for abdominal pain, diarrhea, nausea and vomiting. Genitourinary: Negative for flank pain. Musculoskeletal: Negative for back pain and myalgias. Skin: Negative for color change, pallor, rash and wound. Neurological: Negative for dizziness, weakness and light-headedness. All other systems reviewed and are negative. Physical Exam   Physical Exam  Vitals signs and nursing note reviewed. Constitutional:       General: She is not in acute distress. Appearance: She is well-developed. Comments: Patient in pain in NAD   HENT:      Head: Normocephalic and atraumatic. Eyes:      Conjunctiva/sclera: Conjunctivae normal.   Cardiovascular:      Rate and Rhythm: Normal rate and regular rhythm. Heart sounds: Normal heart sounds. Pulmonary:      Effort: Pulmonary effort is normal. No respiratory distress. Breath sounds: Normal breath sounds.    Abdominal:      General: Bowel sounds are normal. There is no distension. Palpations: Abdomen is soft. Tenderness: There is abdominal tenderness in the suprapubic area. There is no guarding or rebound. Comments: Abdomen distended  No CVA tenderness   Musculoskeletal: Normal range of motion. Skin:     General: Skin is warm. Findings: No rash. Neurological:      Mental Status: She is alert and oriented to person, place, and time. Psychiatric:         Behavior: Behavior normal.         Diagnostic Study Results     Labs -     No results found for this or any previous visit (from the past 12 hour(s)). Radiologic Studies -   CT ABD PELV W CONT   Final Result   IMPRESSION:      1. No evidence of acute solid organ or bowel abnormality. 2. Post surgical changes from prior laparotomy and bowel resection with   reanastomosis. Stable enhancement along the anterior abdominal wall with   adjacent inseparable small bowel loops, likely reflecting adhesions. No evidence   of small bowel obstruction. 3. Stable probable left ovarian cyst.      4. Status post cholecystectomy and hysterectomy. 5. Hepatic steatosis. Preliminary report provided by on-call radiology resident. CT Results  (Last 48 hours)    None        CXR Results  (Last 48 hours)    None          Medical Decision Making   I am the first provider for this patient. I reviewed the vital signs, available nursing notes, past medical history, past surgical history, family history and social history. Vital Signs-Reviewed the patient's vital signs. No data found. EKG interpretation: (Preliminary)  Rhythm: normal sinus rhythm; and regular . Rate (approx.): 76; Axis: normal; CA interval: normal; QRS interval: normal ; ST/T wave: non-specific changes; No acute ischemic changes.     Records Reviewed: Nursing Notes and Old Medical Records    Provider Notes (Medical Decision Making):   DDx: SBO, diverticulitis, colitis gastroenteritis, C. difficile, dehydration, electrolyte abnormality    60 yo F who presents for vomiting, diarrhea, and suprapubic abd pain x 3 days. Hx SBO. Previous laparotomy. No leukocytosis. EKG shows no acute ischemic changes with negative troponin. Low level of concern for ACS. CT scan shows no SBO with nonspecific inflammation to abdomen surrounding incision. Will treat with abx and symptomatic treatment. Pt non-toxic appearing, afebrile, not tachycardiac. Pt stable for prompt outpatient f/u with PCP and GI in 1-2 days. Pt given strict instructions to return if sx worsen. ED Course:   Initial assessment performed. The patients presenting problems have been discussed, and they are in agreement with the care plan formulated and outlined with them. I have encouraged them to ask questions as they arise throughout their visit. Spoke with radiologist who interpreted CT scan. He reports nonspecific inflammatory changes along previous scar. He reports changes were present on last CT scan as well. No SBO. Disposition:  8:25 AM  Discussed lab and imaging results with pt along with dx and treatment plan. Discussed importance of PCP follow up. All questions answered. Pt voiced they understood. Return if sx worsen. PLAN:  1. Discharge Medication List as of 5/27/2020  8:43 PM      START taking these medications    Details   ciprofloxacin HCl (Cipro) 500 mg tablet Take 1 Tab by mouth two (2) times a day for 7 days. , Normal, Disp-14 Tab, R-0      metroNIDAZOLE (FlagyL) 500 mg tablet Take 1 Tab by mouth two (2) times a day for 7 days. , Normal, Disp-14 Tab, R-0         CONTINUE these medications which have CHANGED    Details   ondansetron (Zofran ODT) 4 mg disintegrating tablet Take 1 Tab by mouth every eight (8) hours as needed for Nausea or Vomiting., Normal, Disp-10 Tab, R-0         CONTINUE these medications which have NOT CHANGED    Details   ondansetron hcl (Zofran) 4 mg tablet Take 2 Tabs by mouth every eight (8) hours as needed for Nausea. , Print, Disp-12 Tab, R-0      acetaminophen (TYLENOL) 325 mg tablet Take 2 Tabs by mouth every four (4) hours as needed for Pain., Normal, Disp-20 Tab, R-0      metFORMIN (GLUMETZA ER) 500 mg TG24 24 hour tablet Take 1,000 mg by mouth two (2) times a day. take 1 tablet by mouth twice a day, Normal, Disp-120 Tab, R-11      amLODIPine (NORVASC) 5 mg tablet Take 1 Tab by mouth daily. , Normal, Disp-30 Tab, R-0      albuterol sulfate (PROAIR RESPICLICK) 90 mcg/actuation aepb Take 2 Puffs by inhalation every four (4) hours as needed for Wheezing, Shortness of Breath or Cough., Normal, Disp-1 Inhaler, R-0      ipratropium (ATROVENT) 0.02 % soln 2.5 mL by Nebulization route every six (6) hours as needed for Cough., Normal, Disp-30 Vial, R-1      albuterol (PROVENTIL VENTOLIN) 2.5 mg /3 mL (0.083 %) nebu 3 mL by Nebulization route every six (6) hours as needed for Wheezing., Normal, Disp-30 Nebule, R-1      dexAMETHasone (DECADRON) 2 mg tablet Take 10 mg on the day after tomorrow, Print, Disp-5 Tab, R-0      budesonide-formoterol (SYMBICORT) 160-4.5 mcg/actuation HFAA Take 2 Puffs by inhalation two (2) times a day., Normal, Disp-1 Inhaler, R-2      doxepin (SINEQUAN) 25 mg capsule Take 1 Cap by mouth nightly., Normal, Disp-30 Cap, R-2      polyethylene glycol (MIRALAX) 17 gram/dose powder Take 17 g by mouth daily. 1 tablespoon with 8 oz of water daily, Print, Disp-595 g, R-0      multivitamin (ONE A DAY) tablet Take 1 Tab by mouth daily. , Historical Med      ALLERGY RELIEF, CETIRIZINE, 10 mg tablet take 1 tablet by mouth once daily, Normal, Disp-90 Tab, R-3, PHIL      bisoprolol-hydroCHLOROthiazide (ZIAC) 2.5-6.25 mg per tablet Take 1 Tab by mouth daily. , Normal, Disp-90 Tab, R-3      omeprazole (PRILOSEC) 10 mg capsule take 1 capsule by mouth once daily, Normal, Disp-30 Cap, K-99      folic acid (FOLVITE) 1 mg tablet Take  by mouth daily. , Historical Med      glucose blood VI test strips (ONETOUCH ULTRA TEST) strip Check blood glucose as directed, Normal, Disp-100 Strip, R-1           2. Follow-up Information     Follow up With Specialties Details Why Contact Info    Kenisha Khoury MD Family Practice, Internal Medicine Schedule an appointment as soon as possible for a visit in 1 day  [id number] [address] DEPT Emergency Medicine  If symptoms worsen [address]  [phone]        Return to ED if worse     Diagnosis     Clinical Impression:   1. Enteritis        Attestations:    GUILLERMO Leyva    Please note that this dictation was completed with [employer], the computer voice recognition software. Quite often unanticipated grammatical, syntax, homophones, and other interpretive errors are inadvertently transcribed by the computer software. Please disregard these errors. Please excuse any errors that have escaped final proofreading. Thank you.

## 2020-05-27 NOTE — ED NOTES
Report received from [facility]. Pt was resting in bed at this time and is currently in CT.

## 2020-05-28 ENCOUNTER — PATIENT OUTREACH (OUTPATIENT)
Dept: FAMILY MEDICINE CLINIC | Facility: CLINIC | Age: 60
End: 2020-05-28

## 2020-05-28 LAB
ATRIAL RATE: 76 BPM
CALCULATED P AXIS, ECG09: 65 DEGREES
CALCULATED R AXIS, ECG10: 58 DEGREES
CALCULATED T AXIS, ECG11: 83 DEGREES
DIAGNOSIS, 93000: NORMAL
P-R INTERVAL, ECG05: 136 MS
Q-T INTERVAL, ECG07: 386 MS
QRS DURATION, ECG06: 70 MS
QTC CALCULATION (BEZET), ECG08: 434 MS
VENTRICULAR RATE, ECG03: 76 BPM

## 2020-05-28 NOTE — DISCHARGE INSTRUCTIONS
Patient Education     Colitis: Care Instructions  Your Care Instructions  Colitis is the medical term for swelling (inflammation) of the intestine. It can be caused by different things, such as an infection or loss of blood flow in the intestine. Other causes are problems like Crohn's disease or ulcerative colitis. Symptoms may include fever, diarrhea that may be bloody, or belly pain. Sometimes symptoms go away without treatment. But you may need treatment or more tests, such as blood tests or a stool test. Or you may need imaging tests like a CT scan or a colonoscopy. In some cases, the doctor may want to test a sample of tissue from the intestine. This test is called a biopsy. The doctor has checked you carefully, but problems can develop later. If you notice any problems or new symptoms, get medical treatment right away. Follow-up care is a key part of your treatment and safety. Be sure to make and go to all appointments, and call your doctor if you are having problems. It's also a good idea to know your test results and keep a list of the medicines you take. How can you care for yourself at home? · Rest until you feel better. · Your doctor may recommend that you eat bland foods. These include rice, dry toast or crackers, bananas, and applesauce. · To prevent dehydration, drink plenty of fluids. Choose water and other caffeine-free clear liquids until you feel better. If you have kidney, heart, or liver disease and have to limit fluids, talk with your doctor before you increase the amount of fluids you drink. · Be safe with medicines. Take your medicines exactly as prescribed. Call your doctor if you think you are having a problem with your medicine. You will get more details on the specific medicines your doctor prescribes. When should you call for help? Call 911 anytime you think you may need emergency care. For example, call if:  · You passed out (lost consciousness).   · You vomit blood or what looks like coffee grounds. · Your stools are maroon or very bloody. Call your doctor now or seek immediate medical care if:  · You have new or worse pain. · You have a new or higher fever. · You have new or worse symptoms. · You cannot keep fluids or medicines down. Watch closely for changes in your health, and be sure to contact your doctor if:  · You do not get better as expected. Where can you learn more? Go to [employer]  Enter [id number] in the search box to learn more about \"Colitis: Care Instructions. \"   © 2536-0117 [employer]. Care instructions adapted under license by [employer] (which disclaims liability or warranty for this information). This care instruction is for use with your licensed healthcare professional. If you have questions about a medical condition or this instruction, always ask your healthcare professional. [address] any warranty or liability for your use of this information.   Content Version: 87.2.565639; Current as of: November 20, 2015

## 2020-05-28 NOTE — PROGRESS NOTES
Concerns for Covid 19 Transition    Date/Time:  5/28/2020 9:30 AM  Attempted to reach patient by telephone. Left HIPPA compliant message requesting a return call. Will attempt to reach patient again.

## 2020-05-29 ENCOUNTER — PATIENT OUTREACH (OUTPATIENT)
Dept: FAMILY MEDICINE CLINIC | Facility: CLINIC | Age: 60
End: 2020-05-29

## 2020-05-29 NOTE — PROGRESS NOTES
Patient contacted regarding recent discharge and COVID-19 risk. Discussed COVID-19 related testing which was not done at this time. Test results were not done. Patient informed of results, if available? n/a    Care Coordinatorcontacted the patient by telephone to perform post discharge assessment. Verified name and  with patient as identifiers. Patient has following risk factors of: asthma, diabetes and HTN. Care Coordinator reviewed discharge instructions, medical action plan and red flags related to discharge diagnosis. Reviewed and educated them on any new and changed medications related to discharge diagnosis. Education provided regarding infection prevention, and signs and symptoms of COVID-19 and when to seek medical attention with patient who verbalized understanding. Discussed exposure protocols and quarantine from [address] you at higher risk for severe illness  and given an opportunity for questions and concerns. The patient agrees to contact  PCP office for questions related to their healthcare. Care Coordinator provided contact information for future reference. From CDC: Are you at higher risk for severe illness?  Wash your hands often.  Avoid close contact (6 feet, which is about two arm lengths) with people who are sick.  Put distance between yourself and other people if COVID-19 is spreading in your community.  Clean and disinfect frequently touched surfaces.  Avoid all cruise travel and non-essential air travel.  Call your healthcare professional if you have concerns about COVID-19 and your underlying condition or if you are sick. For more information on steps you can take to protect yourself, see CDC's How to [place] for follow-up call in 7-14 days based on severity of symptoms and risk factors.

## 2020-06-03 ENCOUNTER — HOSPITAL ENCOUNTER (EMERGENCY)
Age: 60
Discharge: HOME | End: 2020-06-03
Attending: EMERGENCY MEDICINE
Payer: COMMERCIAL

## 2020-06-03 VITALS
TEMPERATURE: 98.6 F | DIASTOLIC BLOOD PRESSURE: 96 MMHG | BODY MASS INDEX: 25.61 KG/M2 | SYSTOLIC BLOOD PRESSURE: 143 MMHG | RESPIRATION RATE: 16 BRPM | HEART RATE: 87 BPM | HEIGHT: 64 IN | OXYGEN SATURATION: 100 % | WEIGHT: 150 LBS

## 2020-06-03 DIAGNOSIS — R10.84 ABDOMINAL PAIN, GENERALIZED: Primary | ICD-10-CM

## 2020-06-03 LAB
ALBUMIN SERPL-MCNC: 3.3 G/DL (ref 3.4–5)
ALBUMIN/GLOB SERPL: 0.8 {RATIO} (ref 0.8–1.7)
ALP SERPL-CCNC: 133 U/L (ref 45–117)
ALT SERPL-CCNC: 25 U/L (ref 13–56)
ANION GAP SERPL CALC-SCNC: 7 MMOL/L (ref 3–18)
AST SERPL-CCNC: 11 U/L (ref 10–38)
BASOPHILS # BLD: 0 K/UL (ref 0–0.1)
BASOPHILS NFR BLD: 0 % (ref 0–2)
BILIRUB SERPL-MCNC: 0.2 MG/DL (ref 0.2–1)
BUN SERPL-MCNC: 17 MG/DL (ref 7–18)
BUN/CREAT SERPL: 14 (ref 12–20)
CALCIUM SERPL-MCNC: 9.6 MG/DL (ref 8.5–10.1)
CHLORIDE SERPL-SCNC: 107 MMOL/L (ref 100–111)
CO2 SERPL-SCNC: 26 MMOL/L (ref 21–32)
CREAT SERPL-MCNC: 1.2 MG/DL (ref 0.6–1.3)
DIFFERENTIAL METHOD BLD: ABNORMAL
EOSINOPHIL # BLD: 0.1 K/UL (ref 0–0.4)
EOSINOPHIL NFR BLD: 2 % (ref 0–5)
ERYTHROCYTE [DISTWIDTH] IN BLOOD BY AUTOMATED COUNT: 15 % (ref 11.6–14.5)
GLOBULIN SER CALC-MCNC: 3.9 G/DL (ref 2–4)
GLUCOSE SERPL-MCNC: 165 MG/DL (ref 74–99)
HCT VFR BLD AUTO: 41.7 % (ref 35–45)
HGB BLD-MCNC: 13.2 G/DL (ref 12–16)
LIPASE SERPL-CCNC: 240 U/L (ref 73–393)
LYMPHOCYTES # BLD: 1.2 K/UL (ref 0.9–3.6)
LYMPHOCYTES NFR BLD: 20 % (ref 21–52)
MCH RBC QN AUTO: 26.8 PG (ref 24–34)
MCHC RBC AUTO-ENTMCNC: 31.7 G/DL (ref 31–37)
MCV RBC AUTO: 84.6 FL (ref 74–97)
MONOCYTES # BLD: 0.6 K/UL (ref 0.05–1.2)
MONOCYTES NFR BLD: 10 % (ref 3–10)
NEUTS SEG # BLD: 4.1 K/UL (ref 1.8–8)
NEUTS SEG NFR BLD: 68 % (ref 40–73)
PLATELET # BLD AUTO: 313 K/UL (ref 135–420)
PMV BLD AUTO: 10.8 FL (ref 9.2–11.8)
POTASSIUM SERPL-SCNC: 4 MMOL/L (ref 3.5–5.5)
PROT SERPL-MCNC: 7.2 G/DL (ref 6.4–8.2)
RBC # BLD AUTO: 4.93 M/UL (ref 4.2–5.3)
SODIUM SERPL-SCNC: 140 MMOL/L (ref 136–145)
WBC # BLD AUTO: 6 K/UL (ref 4.6–13.2)

## 2020-06-03 PROCEDURE — 83690 ASSAY OF LIPASE: CPT

## 2020-06-03 PROCEDURE — 96372 THER/PROPH/DIAG INJ SC/IM: CPT

## 2020-06-03 PROCEDURE — 99282 EMERGENCY DEPT VISIT SF MDM: CPT

## 2020-06-03 PROCEDURE — 74011250636 HC RX REV CODE- 250/636: Performed by: EMERGENCY MEDICINE

## 2020-06-03 PROCEDURE — 80053 COMPREHEN METABOLIC PANEL: CPT

## 2020-06-03 PROCEDURE — 85025 COMPLETE CBC W/AUTO DIFF WBC: CPT

## 2020-06-03 RX ORDER — DICYCLOMINE HYDROCHLORIDE 10 MG/ML
20 INJECTION INTRAMUSCULAR ONCE
Status: COMPLETED | OUTPATIENT
Start: 2020-06-03 | End: 2020-06-03

## 2020-06-03 RX ADMIN — DICYCLOMINE HYDROCHLORIDE 20 MG: 10 INJECTION INTRAMUSCULAR at 09:36

## 2020-06-03 NOTE — ED PROVIDER NOTES
EMERGENCY DEPARTMENT HISTORY AND PHYSICAL EXAM    9:30 AM      Date: 6/3/2020  Patient Name: Maurisio Rodriguez    History of Presenting Illness     Chief Complaint   Patient presents with    Abdominal Pain         History Provided By: Patient      Additional History (Context): Maurisio Rodriguez is a 61 y.o. female who presents with abdominal pain and rectal pain. Has a history of chronic abdominal pain. She states that she supposed be seeing GI for this, but they are not currently seeing any patients in person due to coronavirus. The patient states that she did not want to set up a virtual exam because she feels she needs be seen in person. She complains of lower abdominal pain, and states that she is finishing up antibiotics for an infection. She was recently seen emergency department on 5/27/2020. She had a CT of the abdomen done at that time. Patient states that she has had continued abdominal pain, nothing makes it better or worse. She states that her stomach is been more irritated since taking antibiotics. She denies any fevers, chills, chest pain, shortness of breath, vomiting, dysuria, hematuria. She does admit to having some diarrhea due to the antibiotics, which is causing soreness of her rectum and pain. She states it feels \"raw\" her  has been putting ointment on it, states that it was over-the-counter ointment. PCP: Joel Carr MD      Current Outpatient Medications   Medication Sig Dispense Refill    ciprofloxacin HCl (Cipro) 500 mg tablet Take 1 Tab by mouth two (2) times a day for 7 days. 14 Tab 0    metroNIDAZOLE (FlagyL) 500 mg tablet Take 1 Tab by mouth two (2) times a day for 7 days. 14 Tab 0    ondansetron (Zofran ODT) 4 mg disintegrating tablet Take 1 Tab by mouth every eight (8) hours as needed for Nausea or Vomiting. 10 Tab 0    acetaminophen (TYLENOL) 325 mg tablet Take 2 Tabs by mouth every four (4) hours as needed for Pain.  20 Tab 0    metFORMIN [facility]) 500 mg TG24 24 hour tablet Take 1,000 mg by mouth two (2) times a day. take 1 tablet by mouth twice a day 120 Tab 11    amLODIPine (NORVASC) 5 mg tablet Take 1 Tab by mouth daily. 30 Tab 0    albuterol sulfate (PROAIR RESPICLICK) 90 mcg/actuation aepb Take 2 Puffs by inhalation every four (4) hours as needed for Wheezing, Shortness of Breath or Cough. 1 Inhaler 0    ipratropium (ATROVENT) 0.02 % soln 2.5 mL by Nebulization route every six (6) hours as needed for Cough. 30 Vial 1    albuterol (PROVENTIL VENTOLIN) 2.5 mg /3 mL (0.083 %) nebu 3 mL by Nebulization route every six (6) hours as needed for Wheezing. 30 Nebule 1    dexAMETHasone (DECADRON) 2 mg tablet Take 10 mg on the day after tomorrow 5 Tab 0    budesonide-formoterol (SYMBICORT) 160-4.5 mcg/actuation HFAA Take 2 Puffs by inhalation two (2) times a day. 1 Inhaler 2    doxepin (SINEQUAN) 25 mg capsule Take 1 Cap by mouth nightly. 30 Cap 2    polyethylene glycol (MIRALAX) 17 gram/dose powder Take 17 g by mouth daily. 1 tablespoon with 8 oz of water daily 595 g 0    multivitamin (ONE A DAY) tablet Take 1 Tab by mouth daily.  ALLERGY RELIEF, CETIRIZINE, 10 mg tablet take 1 tablet by mouth once daily 90 Tab 3    bisoprolol-hydroCHLOROthiazide (ZIAC) 2.5-6.25 mg per tablet Take 1 Tab by mouth daily. 90 Tab 3    omeprazole (PRILOSEC) 10 mg capsule take 1 capsule by mouth once daily 30 Cap 11    folic acid (FOLVITE) 1 mg tablet Take  by mouth daily.       glucose blood VI test strips (ONETOUCH ULTRA TEST) strip Check blood glucose as directed 100 Strip 1       Past History     Past Medical History:  Past Medical History:   Diagnosis Date    Abdominal adhesions     Adnexal cyst 7/30/2019    Left    Anemia     Asthma     Chronic pelvic pain in female 7/30/2019    Diabetes mellitus     Dyskinesia     bilateral    Essential hypertension     GERD (gastroesophageal reflux disease)     Hypertension     Menopause     Microhematuria     Rheumatoid arteritis ([facility] [address] 2018    Stool color black        Past Surgical History:  Past Surgical History:   Procedure Laterality Date    COLONOSCOPY N/A 1/21/2019    COLON[facility] performed [facility] ENDOSCOPY    HX CHOLECYSTECTOMY  6/28/10    HX COLONOSCOPY      HX ENDOSCOPY      HX HERNIA REPAIR      HX HYSTERECTOMY  1989    Fibroids    HX KNEE REPLACEMENT Left     HX KNEE REPLACEMENT Right 06/2018    HX OOPHORECTOMY  12/2000    HX ORTHOPAEDIC Right     ankle- multiple surgeries    HX SMALL BOWEL RESECTION  12/2000    HX SMALL BOWEL RESECTION  02/21/2017    Dr. Brock Acevedo         Family History:  Family History   Problem Relation Age of Onset    Hypertension Other         parent    Breast Cancer Other 21    Heart Disease Father     Diabetes Father     Lung Disease Father     Diabetes Mother     Hypertension Other         sibling    Diabetes Other         parent    Kidney Disease Brother     Diabetes Brother     Lung Disease Brother        Social History:  Social History     Tobacco Use    Smoking status: Never Smoker    Smokeless tobacco: Never Used   Substance Use Topics    Alcohol use: No    Drug use: No       Allergies: Allergies   Allergen Reactions    Macrodantin [Nitrofurantoin Macrocrystalline] Itching and Other (comments)    Tape [Adhesive] Other (comments)     Paper tape-- feels like burning skin  Burned skin when had wound vac         Review of Systems       Review of Systems   Constitutional: Negative for chills, fatigue and fever. HENT: Negative for congestion, rhinorrhea, sinus pressure, sinus pain, sneezing and sore throat. Eyes: Negative for photophobia and visual disturbance. Respiratory: Negative for cough, shortness of breath and wheezing. Cardiovascular: Negative for chest pain and palpitations. Gastrointestinal: Positive for abdominal pain, diarrhea and nausea.  Negative for constipation and vomiting. Genitourinary: Negative for dysuria, frequency and hematuria. Musculoskeletal: Negative for back pain, neck pain and neck stiffness. Skin: Negative for rash and wound. Neurological: Negative for dizziness, light-headedness and headaches. Psychiatric/Behavioral: Negative for agitation, behavioral problems and confusion. Physical Exam     Visit Vitals  BP (!) 143/96 (BP 1 Location: Right arm, BP Patient Position: Sitting)   Pulse 87   Temp 98.6 °F (37 °C)   Resp 16   Ht 5' 4\" (1.626 m)   Wt 68 kg (150 lb)   SpO2 100%   BMI 25.75 kg/m²       Physical Exam  Constitutional:       General: She is not in acute distress. Appearance: She is not toxic-appearing. HENT:      Head: Normocephalic and atraumatic. Nose: Nose normal.      Mouth/Throat:      Mouth: Mucous membranes are moist.   Eyes:      General: No scleral icterus. Pupils: Pupils are equal, round, and reactive to light. Neck:      Musculoskeletal: Normal range of motion. No neck rigidity. Cardiovascular:      Rate and Rhythm: Normal rate. Heart sounds: No murmur. No gallop. Pulmonary:      Effort: Pulmonary effort is normal. No respiratory distress. Breath sounds: No wheezing. Abdominal:      General: There is no distension. Palpations: Abdomen is soft. Tenderness: There is abdominal tenderness. Comments: Patient has generalized abdominal tenderness palpation. Abdomen soft, nondistended. No guarding rigidity. Genitourinary:     Comments: Patient does have irritation surrounding the rectum. Normal rectal tone. Nonthrombosed external hemorrhoid present. Musculoskeletal: Normal range of motion. General: No swelling or deformity. Lymphadenopathy:      Cervical: No cervical adenopathy. Skin:     General: Skin is warm. Capillary Refill: Capillary refill takes less than 2 seconds. Coloration: Skin is not jaundiced. Findings: No bruising. Neurological:      Mental Status: She is alert and oriented to person, place, and time. Cranial Nerves: No cranial nerve deficit. Motor: No weakness. Psychiatric:         Mood and Affect: Mood normal.         Thought Content: Thought content normal.           Diagnostic Study Results     Labs -  Recent Results (from the past 12 hour(s))   CBC WITH AUTOMATED DIFF    Collection Time: 06/03/20  9:30 AM   Result Value Ref Range    WBC 6.0 4.6 - 13.2 K/uL    RBC 4.93 4.20 - 5.30 M/uL    HGB 13.2 12.0 - 16.0 g/dL    HCT 41.7 35.0 - 45.0 %    MCV 84.6 74.0 - 97.0 FL    MCH 26.8 24.0 - 34.0 PG    MCHC 31.7 31.0 - 37.0 g/dL    RDW 15.0 (H) 11.6 - 14.5 %    PLATELET 688 266 - 693 K/uL    MPV 10.8 9.2 - 11.8 FL    NEUTROPHILS 68 40 - 73 %    LYMPHOCYTES 20 (L) 21 - 52 %    MONOCYTES 10 3 - 10 %    EOSINOPHILS 2 0 - 5 %    BASOPHILS 0 0 - 2 %    ABS. NEUTROPHILS 4.1 1.8 - 8.0 K/UL    ABS. LYMPHOCYTES 1.2 0.9 - 3.6 K/UL    ABS. MONOCYTES 0.6 0.05 - 1.2 K/UL    ABS. EOSINOPHILS 0.1 0.0 - 0.4 K/UL    ABS. BASOPHILS 0.0 0.0 - 0.1 K/UL    DF AUTOMATED     METABOLIC PANEL, COMPREHENSIVE    Collection Time: 06/03/20  9:30 AM   Result Value Ref Range    Sodium 140 136 - 145 mmol/L    Potassium 4.0 3.5 - 5.5 mmol/L    Chloride 107 100 - 111 mmol/L    CO2 26 21 - 32 mmol/L    Anion gap 7 3.0 - 18 mmol/L    Glucose 165 (H) 74 - 99 mg/dL    BUN 17 7.0 - 18 MG/DL    Creatinine 1.20 0.6 - 1.3 MG/DL    BUN/Creatinine ratio 14 12 - 20      GFR est AA 56 (L) >60 ml/min/1.73m2    GFR est non-AA 46 (L) >60 ml/min/1.73m2    Calcium 9.6 8.5 - 10.1 MG/DL    Bilirubin, total 0.2 0.2 - 1.0 MG/DL    ALT (SGPT) 25 13 - 56 U/L    AST (SGOT) 11 10 - 38 U/L    Alk.  phosphatase 133 (H) 45 - 117 U/L    Protein, total 7.2 6.4 - 8.2 g/dL    Albumin 3.3 (L) 3.4 - 5.0 g/dL    Globulin 3.9 2.0 - 4.0 g/dL    A-G Ratio 0.8 0.8 - 1.7     LIPASE    Collection Time: 06/03/20  9:30 AM   Result Value Ref Range    Lipase 240 73 - 393 U/L       Radiologic Studies -   No orders to display         Medical Decision Making   I am the first provider for this patient. I reviewed the vital signs, available nursing notes, past medical history, past surgical history, family history and social history. Vital Signs-Reviewed the patient's vital signs. Records Reviewed: Nursing Notes (Time of Review: 9:30 AM)    ED Course: Progress Notes, Reevaluation, and Consults:  Time: 5005. Patient is resting comfortably in bed. No acute distress. I did update her on her lab results. I did discuss the patient that she needs to follow-up with GI and call them today. She understands and has no further questions at this time. Patient be discharged home. Provider Notes (Medical Decision Making):   MDM  Number of Diagnoses or Management Options  Abdominal pain, generalized:   Diagnosis management comments: Patient is a 27-year-old female who presents with a chief complaint of abdominal pain, nausea, diarrhea. The patient has been in the emergency department several times for abdominal pain. She did have a CT of the abdomen done on 5/27/2020 which did not show any acute abnormality. Patient states she was placed on ciprofloxacin and Flagyl, she is still taking these antibiotics. It did cause her to have some diarrhea. The patient again has history of chronic abdominal pain and was supposed to follow-up with GI. Patient states that she has not yet called them because she does not want to do a virtual appointment, she wants to do an in person appointment. I did discuss the patient the need to call GI today. She understands this. Her labs are within normal limits. I do not feel the need to repeat imaging on her. Her vitals are stable emergency department, no elevation of white count. I do not feel the need for any further imaging or blood work at this time. Critical Care Time: 0      Diagnosis     Clinical Impression:   1.  Abdominal pain, generalized Disposition: Discharge    Follow-up Information    None          Patient's Medications   Start Taking    No medications on file   Continue Taking    ACETAMINOPHEN (TYLENOL) 325 MG TABLET    Take 2 Tabs by mouth every four (4) hours as needed for Pain. ALBUTEROL (PROVENTIL VENTOLIN) 2.5 MG /3 ML (0.083 %) NEBU    3 mL by Nebulization route every six (6) hours as needed for Wheezing. ALBUTEROL SULFATE (PROAIR RESPICLICK) 90 MCG/ACTUATION AEPB    Take 2 Puffs by inhalation every four (4) hours as needed for Wheezing, Shortness of Breath or Cough. ALLERGY RELIEF, CETIRIZINE, 10 MG TABLET    take 1 tablet by mouth once daily    AMLODIPINE (NORVASC) 5 MG TABLET    Take 1 Tab by mouth daily. BISOPROLOL-HYDROCHLOROTHIAZIDE (ZIAC) 2.5-6.25 MG PER TABLET    Take 1 Tab by mouth daily. BUDESONIDE-FORMOTEROL (SYMBICORT) 160-4.5 MCG/ACTUATION HFAA    Take 2 Puffs by inhalation two (2) times a day. CIPROFLOXACIN HCL (CIPRO) 500 MG TABLET    Take 1 Tab by mouth two (2) times a day for 7 days. DEXAMETHASONE (DECADRON) 2 MG TABLET    Take 10 mg on the day after tomorrow    DOXEPIN (SINEQUAN) 25 MG CAPSULE    Take 1 Cap by mouth nightly. FOLIC ACID (FOLVITE) 1 MG TABLET    Take  by mouth daily. GLUCOSE BLOOD VI TEST STRIPS (ONETOUCH ULTRA TEST) STRIP    Check blood glucose as directed    IPRATROPIUM (ATROVENT) 0.02 % SOLN    2.5 mL by Nebulization route every six (6) hours as needed for Cough. METFORMIN (GLUMETZA ER) 500 MG TG24 24 HOUR TABLET    Take 1,000 mg by mouth two (2) times a day. take 1 tablet by mouth twice a day    METRONIDAZOLE (FLAGYL) 500 MG TABLET    Take 1 Tab by mouth two (2) times a day for 7 days. MULTIVITAMIN (ONE A DAY) TABLET    Take 1 Tab by mouth daily. OMEPRAZOLE (PRILOSEC) 10 MG CAPSULE    take 1 capsule by mouth once daily    ONDANSETRON (ZOFRAN ODT) 4 MG DISINTEGRATING TABLET    Take 1 Tab by mouth every eight (8) hours as needed for Nausea or Vomiting. POLYETHYLENE GLYCOL (MIRALAX) 17 GRAM/DOSE POWDER    Take 17 g by mouth daily. 1 tablespoon with 8 oz of water daily   These Medications have changed    No medications on file   Stop Taking    ONDANSETRON HCL (ZOFRAN) 4 MG TABLET    Take 2 Tabs by mouth every eight (8) hours as needed for Nausea.     _______________________________    Please note that this dictation was completed with [employer], the computer voice recognition software. Quite often unanticipated grammatical, syntax, homophones, and other interpretive errors are inadvertently transcribed by the computer software. Please disregard these errors. Please excuse any errors that have escaped final proofreading.

## 2020-06-03 NOTE — ED NOTES
I have reviewed discharge instructions with the patient. The patient verbalized understanding.   Patient armband removed and shredded  Pt ambulatory to jake

## 2020-06-03 NOTE — ED TRIAGE NOTES
Patient with Chronic abdominal pain, seen 4 times for same in last 3 weeks, advised patient to call her GI doctor once home

## 2020-06-04 ENCOUNTER — VIRTUAL VISIT (OUTPATIENT)
Dept: FAMILY MEDICINE CLINIC | Age: 60
End: 2020-06-04

## 2020-06-04 ENCOUNTER — PATIENT OUTREACH (OUTPATIENT)
Dept: FAMILY MEDICINE CLINIC | Facility: CLINIC | Age: 60
End: 2020-06-04

## 2020-06-04 DIAGNOSIS — E11.65 TYPE 2 DIABETES MELLITUS WITH HYPERGLYCEMIA, WITHOUT LONG-TERM CURRENT USE OF INSULIN (HCC): Primary | ICD-10-CM

## 2020-06-04 DIAGNOSIS — Z01.818 PRE-OP EXAM: ICD-10-CM

## 2020-06-04 DIAGNOSIS — J45.40 MODERATE PERSISTENT ASTHMA WITHOUT COMPLICATION: ICD-10-CM

## 2020-06-04 DIAGNOSIS — G89.29 CHRONIC ABDOMINAL PAIN: ICD-10-CM

## 2020-06-04 DIAGNOSIS — R10.9 CHRONIC ABDOMINAL PAIN: ICD-10-CM

## 2020-06-04 DIAGNOSIS — G89.29 INSOMNIA SECONDARY TO CHRONIC PAIN: ICD-10-CM

## 2020-06-04 DIAGNOSIS — R10.31 ABDOMINAL PAIN, RIGHT LOWER QUADRANT: ICD-10-CM

## 2020-06-04 DIAGNOSIS — M67.472 GANGLION CYST OF LEFT FOOT: ICD-10-CM

## 2020-06-04 DIAGNOSIS — F41.9 ANXIETY: ICD-10-CM

## 2020-06-04 DIAGNOSIS — G47.01 INSOMNIA SECONDARY TO CHRONIC PAIN: ICD-10-CM

## 2020-06-04 RX ORDER — HYDROCODONE BITARTRATE AND ACETAMINOPHEN 5; 325 MG/1; MG/1
1 TABLET ORAL
Qty: 12 TAB | Refills: 0 | Status: SHIPPED | OUTPATIENT
Start: 2020-06-04 | End: 2020-09-29 | Stop reason: SDUPTHER

## 2020-06-04 RX ORDER — DOXEPIN HYDROCHLORIDE 25 MG/1
25 CAPSULE ORAL
Qty: 90 CAP | Refills: 3 | Status: SHIPPED | OUTPATIENT
Start: 2020-06-04 | End: 2021-01-27

## 2020-06-04 NOTE — PROGRESS NOTES
Marie Atkins is a 61 y.o.  female and presents with    Chief Complaint   Patient presents with    Diabetes    Asthma    Anxiety    Insomnia     She is evaluated via Doxy. me. Consent: Marie Atkins, who was seen by synchronous (real-time) audio-video technology, and/or her healthcare decision maker, is aware that this patient-initiated, Telehealth encounter on 6/4/2020 is a billable service, with coverage as determined by her insurance carrier. She is aware that she may receive a bill and has provided verbal consent to proceed: Yes. Pt is at her home, and I am at [facility]. This visit was conducted as a synchronous telemedicine service rendered via real-time interactive audio and video telecommunications system. On March 11, 2020, the [facility] declared the COVID-19 (Novel Coronavirus) viral disease to be a pandemic. As a result of this emergency, a rapidly evolving situation, practice patterns for physicians, physician assistants, and nurse practitioners are shifting to accommodate the need to treat in conjunction with unprecedented guidance from federal, state, and local authorities which include, but are not limited to, self-quarantines and/or limiting physical proximity to others under any number of circumstances. It is within this context (and with the understanding that this method of patient encounter is in the patient's best interest as well as the health and safety of other patients and the public) that Lewanda Memory is being provided for this patient encounter rather than a face-to-face visit. This patient encounter is appropriate and reasonable under the circumstances given the patient's particular presentation at this time.     Subjective:  She states that her left foot has a cyst on the dorsal aspect and is followed by dr. Chapito Elise; she will undergo removal.    She had hemorrhoid and was treated by dr. Julita Felipe for infection with resolution of symptoms. She complains of lower abdominal pain, and states that she is finishing up antibiotics for an infection. She was recently seen emergency department on 5/27/2020. She had a CT of the abdomen done at that time. Patient states that she has had continued abdominal pain, nothing makes it better or worse. She states that her stomach is been more irritated since taking antibiotics. She denies any fevers, chills, chest pain, shortness of breath, vomiting, dysuria, hematuria. She does admit to having some diarrhea due to the antibiotics, which is causing soreness of her rectum and pain. She states it feels \"raw\" her  has been putting ointment on it, states that it was over-the-counter ointment. Depression Review:  Patient is seen for followup of depression. Treatment includes none and no other therapies. Ongoing symptoms include depressed mood, anhedonia, insomnia, fatigue, feelings of worthlessness/guilt, difficulty concentrating and hopelessness. She denies hypersomnia, impaired memory and recurrent thoughts of death.   She experiences the following side effects from the treatment: none. Anxiety Review:  Patient is seen for sleep disturbance. Current treatment includes doxepin and no other therapies. Ongoing symptoms include: insomnia. Patient denies: palpitations, sweating, chest pain, shortness of breath, dizziness, paresthesias, racing thoughts, psychomotor agitation, feelings of losing control, difficulty concentrating, suicidal ideation. Reported side effects from the treatment: none.     ROS   Constitutional: Negative for fever. HENT: Negative for sore throat.    Eyes: Negative for visual disturbance. Respiratory: Negative for shortness of breath.    Cardiovascular: Negative for chest pain. Gastrointestinal: Positive for abdominal pain. Endocrine: Negative for polyuria. Genitourinary: Positive for frequency. Negative for difficulty urinating and hematuria. Musculoskeletal: Positive for back pain. Negative for gait problem. Skin: wound site healing  Allergic/Immunologic: Negative for immunocompromised state. Neurological: Negative for syncope. Psychiatric/Behavioral: Positive for sleep disturbance.   All other systems reviewed and are negative. Objective:  Vitals:    06/04/20 1405   PainSc:  10 - Worst pain ever   PainLoc: Abdomen       alert, well appearing, and in no distress, oriented to person, place, and time and normal appearing weight  Mental status - normal mood, behavior, speech, dress, motor activity, and thought processes  Chest - normal work of breathing  Neurological - cranial nerves II through XII intact    LABS   WBC 6  hgb 13.2  Platelet 694  Glucose 165  GFR 56    TESTS  CT abd/pelvis  IMPRESSION:     1. No evidence of acute solid organ or bowel abnormality.     2. Post surgical changes from prior laparotomy and bowel resection with  reanastomosis. Stable enhancement along the anterior abdominal wall with  adjacent inseparable small bowel loops, likely reflecting adhesions. No evidence  of small bowel obstruction.     3. Stable probable left ovarian cyst.     4. Status post cholecystectomy and hysterectomy.     5. Hepatic steatosis. Assessment/Plan:    1. Insomnia secondary to chronic pain  Continue sleep aid; no side effects  - doxepin (SINEquan) 25 mg capsule; Take 1 Cap by mouth nightly. Dispense: 90 Cap; Refill: 3    2. Type 2 diabetes mellitus with hyperglycemia, without long-term current use of insulin ([facility])  Goal hgb a1c <7    3. Moderate persistent asthma without complication  Continue inhaled therapy    4. Anxiety  Mood improved    5. Chronic abdominal pain  This is a chronic problem that is stable. Per review of available records and patients , there are not sign of overuse, misuse, diversion, or concerning side effects.  Today we reviewed: the risk of overdose, addiction, and dependency proper storage and disposal of medications the goals of treatment (improve functionality, quality of life, and pain)  The following changes were made to the patients current treatment plan: medications adjusted, see orders.          - HYDROcodone-acetaminophen (NORCO) 5-325 mg per tablet; Take 1 Tab by mouth every eight (8) hours as needed for Pain for up to 7 days. Max Daily Amount: 3 Tabs. Do not take tylenol with Norco since they both contain acetaminophen. Dispense: 12 Tab; Refill: 0    6. Abdominal pain, right lower quadrant    - HYDROcodone-acetaminophen (NORCO) 5-325 mg per tablet; Take 1 Tab by mouth every eight (8) hours as needed for Pain for up to 7 days. Max Daily Amount: 3 Tabs. Do not take tylenol with Norco since they both contain acetaminophen. Dispense: 12 Tab; Refill: 0    7. Ganglion cyst left foot  F/u with dr. Cassie Bragg    8. Pre op exam  Pt cleared for surgery  Lab review: labs reviewed, I note that renal functions mildly abnormal but acceptable, hemogram normal      I have discussed the diagnosis with the patient and the intended plan as seen in the above orders. I have discussed medication side effects and warnings with the patient as well. I have reviewed the plan of care with the patient, accepted their input and they are in agreement with the treatment goals.

## 2020-06-04 NOTE — PROGRESS NOTES
Lissa Jimenez presents today for   Chief Complaint   Patient presents with    Diabetes    Asthma    Anxiety    Insomnia       Is someone accompanying this pt? na    Is the patient using any DME equipment during OV? Na    Depression Screening:  3 most recent PHQ Screens 1/29/2020   Little interest or pleasure in doing things Not at all   Feeling down, depressed, irritable, or hopeless Not at all   Total Score PHQ 2 0       Learning Assessment:  Learning Assessment 7/22/2019   PRIMARY LEARNER Patient   HIGHEST LEVEL OF EDUCATION - PRIMARY LEARNER  -   BARRIERS PRIMARY LEARNER NONE   CO-LEARNER CAREGIVER No   PRIMARY LANGUAGE ENGLISH   LEARNER PREFERENCE PRIMARY READING   ANSWERED BY Patient   RELATIONSHIP SELF       Abuse Screening:  Abuse Screening Questionnaire 7/22/2019   Do you ever feel afraid of your partner? N   Are you in a relationship with someone who physically or mentally threatens you? N   Is it safe for you to go home? Y       Fall Risk  Fall Risk Assessment, last 12 mths 7/22/2019   Able to walk? Yes   Fall in past 12 months? No       Health Maintenance reviewed and discussed and ordered per Provider. Health Maintenance Due   Topic Date Due    DTaP/Tdap/Td series (1 - Tdap) 02/04/1981    PAP AKA CERVICAL CYTOLOGY  02/04/1981    Shingrix Vaccine Age 50> (1 of 2) 02/04/2010    Eye Exam Retinal or Dilated  05/29/2019   . Coordination of Care:  1. Have you been to the ER, urgent care clinic since your last visit? Hospitalized since your last visit? Yes, Dr. Adrienne Caba today    2. Have you seen or consulted any other health care providers outside of the [address] [place] [address] since your last visit? Include any pap smears or colon screening. no      Last  Checked na  Last UDS Checked na  Last Pain contract signed: na    Patient presents in office today for routine care.   Patient concerns:needs an okay for cyst removal on left foot

## 2020-06-04 NOTE — PROGRESS NOTES
Date/Time:  6/4/2020 11:22 AM  Attempted to reach patient by telephone. Left HIPPA compliant message requesting a return call. Will attempt to reach patient again.

## 2020-06-05 ENCOUNTER — PATIENT OUTREACH (OUTPATIENT)
Dept: FAMILY MEDICINE CLINIC | Facility: CLINIC | Age: 60
End: 2020-06-05

## 2020-06-05 NOTE — PROGRESS NOTES
Date/Time:  6/5/2020 2:23 PM  2nd attempt to reach patient by telephone. Left HIPPA compliant message requesting a return call. This episode is resolved. Patient f/u with pcp on 6/5/2020 virtual visit.

## 2020-06-08 ENCOUNTER — TELEPHONE (OUTPATIENT)
Dept: FAMILY MEDICINE CLINIC | Age: 60
End: 2020-06-08

## 2020-06-08 NOTE — TELEPHONE ENCOUNTER
Pt was last seen on 6/4/2020 and stated that her [facility] ([phone])informed her that they need a letter stating patient is cleared for surgery. Pt stated that she doesn't know that fax #. Asking to be called back once its processed.

## 2020-06-11 ENCOUNTER — TELEPHONE (OUTPATIENT)
Dept: FAMILY MEDICINE CLINIC | Age: 60
End: 2020-06-11

## 2020-06-11 NOTE — TELEPHONE ENCOUNTER
Pt called back in returning Nurse Belkis's phone call. Spoke with the nurse and she informed me that I needed to let the patient know to call [address] and tell them to fax the form they needed filled out since she never got it and when I relayed to the patient she said that they just needed  The notes from her virtual visit sent over stating that she is cleared for surgery. Contact information is in earlier encounter. Please advise.

## 2020-06-11 NOTE — TELEPHONE ENCOUNTER
Patient states that Dr. Juan Oates said he would clear patient for her surgery. I looked at the last encounter and there is no mention of clearance. Will you make a note so I can fax it to Dr. Nely Hernández office.

## 2020-06-11 NOTE — TELEPHONE ENCOUNTER
Savannah Avila from Dr. [facility] office stated pt. Told them that Dr. Leonardo Brunson would sign for her pre-op clearance from her last visit. Savannah Avila can be reached at [phone] and their Fax number is [phone].  Please assist.

## 2020-06-12 ENCOUNTER — TELEPHONE (OUTPATIENT)
Dept: FAMILY MEDICINE CLINIC | Age: 60
End: 2020-06-12

## 2020-06-14 DIAGNOSIS — I10 ESSENTIAL HYPERTENSION: Chronic | ICD-10-CM

## 2020-06-15 RX ORDER — BISOPROLOL FUMARATE AND HYDROCHLOROTHIAZIDE 2.5; 6.25 MG/1; MG/1
TABLET ORAL
Qty: 90 TAB | Refills: 3 | Status: SHIPPED | OUTPATIENT
Start: 2020-06-15 | End: 2021-01-27 | Stop reason: SDUPTHER

## 2020-06-29 ENCOUNTER — HOSPITAL ENCOUNTER (EMERGENCY)
Age: 60
Discharge: HOME | End: 2020-06-29
Attending: EMERGENCY MEDICINE
Payer: COMMERCIAL

## 2020-06-29 ENCOUNTER — APPOINTMENT (OUTPATIENT)
Dept: CT IMAGING | Age: 60
End: 2020-06-29
Attending: PHYSICIAN ASSISTANT
Payer: COMMERCIAL

## 2020-06-29 VITALS
DIASTOLIC BLOOD PRESSURE: 94 MMHG | SYSTOLIC BLOOD PRESSURE: 157 MMHG | OXYGEN SATURATION: 98 % | WEIGHT: 150 LBS | TEMPERATURE: 97.7 F | BODY MASS INDEX: 25.75 KG/M2 | HEART RATE: 93 BPM | RESPIRATION RATE: 15 BRPM

## 2020-06-29 DIAGNOSIS — R10.31 RLQ ABDOMINAL PAIN: Primary | ICD-10-CM

## 2020-06-29 LAB
ALBUMIN SERPL-MCNC: 3.3 G/DL (ref 3.4–5)
ALBUMIN/GLOB SERPL: 0.8 {RATIO} (ref 0.8–1.7)
ALP SERPL-CCNC: 105 U/L (ref 45–117)
ALT SERPL-CCNC: 19 U/L (ref 13–56)
ANION GAP SERPL CALC-SCNC: 5 MMOL/L (ref 3–18)
APPEARANCE UR: CLEAR
AST SERPL-CCNC: 15 U/L (ref 10–38)
BASOPHILS # BLD: 0 K/UL (ref 0–0.1)
BASOPHILS NFR BLD: 0 % (ref 0–2)
BILIRUB SERPL-MCNC: 0.3 MG/DL (ref 0.2–1)
BILIRUB UR QL: NEGATIVE
BUN SERPL-MCNC: 21 MG/DL (ref 7–18)
BUN/CREAT SERPL: 19 (ref 12–20)
CALCIUM SERPL-MCNC: 9.7 MG/DL (ref 8.5–10.1)
CHLORIDE SERPL-SCNC: 104 MMOL/L (ref 100–111)
CO2 SERPL-SCNC: 29 MMOL/L (ref 21–32)
COLOR UR: YELLOW
CREAT SERPL-MCNC: 1.13 MG/DL (ref 0.6–1.3)
DIFFERENTIAL METHOD BLD: ABNORMAL
EOSINOPHIL # BLD: 0.2 K/UL (ref 0–0.4)
EOSINOPHIL NFR BLD: 3 % (ref 0–5)
ERYTHROCYTE [DISTWIDTH] IN BLOOD BY AUTOMATED COUNT: 15 % (ref 11.6–14.5)
GLOBULIN SER CALC-MCNC: 4.2 G/DL (ref 2–4)
GLUCOSE SERPL-MCNC: 96 MG/DL (ref 74–99)
GLUCOSE UR STRIP.AUTO-MCNC: >1000 MG/DL
HCT VFR BLD AUTO: 42.2 % (ref 35–45)
HGB BLD-MCNC: 13.4 G/DL (ref 12–16)
HGB UR QL STRIP: NEGATIVE
KETONES UR QL STRIP.AUTO: NEGATIVE MG/DL
LEUKOCYTE ESTERASE UR QL STRIP.AUTO: NEGATIVE
LIPASE SERPL-CCNC: 188 U/L (ref 73–393)
LYMPHOCYTES # BLD: 2 K/UL (ref 0.9–3.6)
LYMPHOCYTES NFR BLD: 31 % (ref 21–52)
MCH RBC QN AUTO: 27.1 PG (ref 24–34)
MCHC RBC AUTO-ENTMCNC: 31.8 G/DL (ref 31–37)
MCV RBC AUTO: 85.3 FL (ref 74–97)
MONOCYTES # BLD: 0.5 K/UL (ref 0.05–1.2)
MONOCYTES NFR BLD: 7 % (ref 3–10)
NEUTS SEG # BLD: 3.7 K/UL (ref 1.8–8)
NEUTS SEG NFR BLD: 59 % (ref 40–73)
NITRITE UR QL STRIP.AUTO: NEGATIVE
PH UR STRIP: 5 [PH] (ref 5–8)
PLATELET # BLD AUTO: 303 K/UL (ref 135–420)
PMV BLD AUTO: 10.6 FL (ref 9.2–11.8)
POTASSIUM SERPL-SCNC: 4.2 MMOL/L (ref 3.5–5.5)
PROT SERPL-MCNC: 7.5 G/DL (ref 6.4–8.2)
PROT UR STRIP-MCNC: NEGATIVE MG/DL
RBC # BLD AUTO: 4.95 M/UL (ref 4.2–5.3)
SODIUM SERPL-SCNC: 138 MMOL/L (ref 136–145)
SP GR UR REFRACTOMETRY: 1.03 (ref 1–1.03)
UROBILINOGEN UR QL STRIP.AUTO: 0.2 EU/DL (ref 0.2–1)
WBC # BLD AUTO: 6.3 K/UL (ref 4.6–13.2)

## 2020-06-29 PROCEDURE — 96375 TX/PRO/DX INJ NEW DRUG ADDON: CPT

## 2020-06-29 PROCEDURE — 85025 COMPLETE CBC W/AUTO DIFF WBC: CPT

## 2020-06-29 PROCEDURE — 74177 CT ABD & PELVIS W/CONTRAST: CPT

## 2020-06-29 PROCEDURE — 83690 ASSAY OF LIPASE: CPT

## 2020-06-29 PROCEDURE — 96361 HYDRATE IV INFUSION ADD-ON: CPT

## 2020-06-29 PROCEDURE — 74011636320 HC RX REV CODE- 636/320: Performed by: EMERGENCY MEDICINE

## 2020-06-29 PROCEDURE — 81003 URINALYSIS AUTO W/O SCOPE: CPT

## 2020-06-29 PROCEDURE — 87086 URINE CULTURE/COLONY COUNT: CPT

## 2020-06-29 PROCEDURE — 74011250636 HC RX REV CODE- 250/636: Performed by: PHYSICIAN ASSISTANT

## 2020-06-29 PROCEDURE — 99283 EMERGENCY DEPT VISIT LOW MDM: CPT

## 2020-06-29 PROCEDURE — 80053 COMPREHEN METABOLIC PANEL: CPT

## 2020-06-29 PROCEDURE — 96376 TX/PRO/DX INJ SAME DRUG ADON: CPT

## 2020-06-29 PROCEDURE — 96374 THER/PROPH/DIAG INJ IV PUSH: CPT

## 2020-06-29 RX ORDER — MORPHINE SULFATE 2 MG/ML
2 INJECTION, SOLUTION INTRAMUSCULAR; INTRAVENOUS
Status: COMPLETED | OUTPATIENT
Start: 2020-06-29 | End: 2020-06-29

## 2020-06-29 RX ORDER — ACETAMINOPHEN AND CODEINE PHOSPHATE 300; 30 MG/1; MG/1
1 TABLET ORAL
Qty: 14 TAB | Refills: 0 | Status: SHIPPED | OUTPATIENT
Start: 2020-06-29 | End: 2020-07-04

## 2020-06-29 RX ORDER — ONDANSETRON 2 MG/ML
4 INJECTION INTRAMUSCULAR; INTRAVENOUS
Status: COMPLETED | OUTPATIENT
Start: 2020-06-29 | End: 2020-06-29

## 2020-06-29 RX ORDER — POLYETHYLENE GLYCOL 3350 17 G/17G
17 POWDER, FOR SOLUTION ORAL DAILY
Qty: 289 G | Refills: 0 | Status: ON HOLD | OUTPATIENT
Start: 2020-06-29 | End: 2020-09-12

## 2020-06-29 RX ORDER — ONDANSETRON 4 MG/1
4 TABLET, ORALLY DISINTEGRATING ORAL
Qty: 15 TAB | Refills: 0 | Status: SHIPPED | OUTPATIENT
Start: 2020-06-29 | End: 2020-11-04

## 2020-06-29 RX ADMIN — SODIUM CHLORIDE 1000 ML: 900 INJECTION, SOLUTION INTRAVENOUS at 16:39

## 2020-06-29 RX ADMIN — MORPHINE SULFATE 2 MG: 2 INJECTION, SOLUTION INTRAMUSCULAR; INTRAVENOUS at 16:39

## 2020-06-29 RX ADMIN — ONDANSETRON 4 MG: 2 INJECTION INTRAMUSCULAR; INTRAVENOUS at 16:39

## 2020-06-29 RX ADMIN — MORPHINE SULFATE 2 MG: 2 INJECTION, SOLUTION INTRAMUSCULAR; INTRAVENOUS at 17:51

## 2020-06-29 RX ADMIN — IOPAMIDOL 97 ML: 612 INJECTION, SOLUTION INTRAVENOUS at 17:39

## 2020-06-29 NOTE — ED TRIAGE NOTES
Pt presents for R sided pelvic and back pain that is described as shooting pain. Pt states she was walking when it occurred. Pt also states she had 1 bout of vomiting yesterday and diarrhea x 2. H/o ovarian cysts. Denies leg involvement.  Denies blood in urine

## 2020-06-29 NOTE — ED NOTES
I performed a brief history of the patient here in triage and I have determined that pt will need further treatment and evaluation from the main side ER physician. I have placed initial orders based on the history to help in expediting patients care.        Visit Vitals  BP (!) 157/94   Pulse 93   Temp 97.7 °F (36.5 °C)   Resp 15   Wt 68 kg (150 lb)   SpO2 98%   BMI 25.75 kg/m²      GUILLERMO Duarte 2:29 PM

## 2020-06-29 NOTE — ED PROVIDER NOTES
EMERGENCY DEPARTMENT HISTORY AND PHYSICAL EXAM    Date: 6/29/2020  Patient Name: Regina Ji    History of Presenting Illness     Chief Complaint   Patient presents with    Pelvic Pain    Back Pain         History Provided By:patient     Chief Complaint: lower abdomen/pelvic region  Duration: 1 day  Timing:acute  Location: R lower abdomen and pelvic region, lower back   Quality: sharp shooting pain   Severity:moderate   Modifying Factors: worse with movement   Associated Symptoms: 1 episode of N/V yesterday, diarrhea last night and this am, low back pain        Additional History (Context): Regina Ji is a 61 y.o. female with PMH anemia, asthma, right pelvic pain, dyskinesia retention, GERD, rheumatoid arthritis, and ovarian cysts (post total hysterectomy) who presents with complaints of 1 day of right lower abdomen and pelvic pain as well as low back pain. Patient states the pain is sharp and shooting. Patient reports that the pain for started while she was walking. She reports an episode of nausea/vomitingyesterday and a few episodes of diarrhea last, as well as this am.  Denies any known sick exposures. Denies fever, chills, shortness of breath, chest pain, cough/cold symptoms, urinary symptoms, and vaginal discharge. No other complaints reported at this time. PCP: Trini Calle MD    Current Outpatient Medications   Medication Sig Dispense Refill    polyethylene glycol (Miralax) 17 gram/dose powder Take 17 g by mouth daily. 1 tablespoon with 8 oz of water daily 289 g 0    ondansetron (Zofran ODT) 4 mg disintegrating tablet Take 1 Tab by mouth every eight (8) hours as needed for Nausea or Vomiting. 15 Tab 0    acetaminophen-codeine (Tylenol-Codeine #3) 300-30 mg per tablet Take 1 Tab by mouth every six (6) hours as needed for Pain for up to 5 days. Max Daily Amount: 4 Tabs.  14 Tab 0    bisoprolol-hydroCHLOROthiazide (ZIAC) 2.5-6.25 mg per tablet take 1 tablet by mouth once daily 90 Tab 3    doxepin (SINEquan) 25 mg capsule Take 1 Cap by mouth nightly. 90 Cap 3    acetaminophen (TYLENOL) 325 mg tablet Take 2 Tabs by mouth every four (4) hours as needed for Pain. 20 Tab 0    metFORMIN (GLUMETZA ER) 500 mg TG24 24 hour tablet Take 1,000 mg by mouth two (2) times a day. take 1 tablet by mouth twice a day 120 Tab 11    amLODIPine (NORVASC) 5 mg tablet Take 1 Tab by mouth daily. 30 Tab 0    albuterol sulfate (PROAIR RESPICLICK) 90 mcg/actuation aepb Take 2 Puffs by inhalation every four (4) hours as needed for Wheezing, Shortness of Breath or Cough. 1 Inhaler 0    ipratropium (ATROVENT) 0.02 % soln 2.5 mL by Nebulization route every six (6) hours as needed for Cough. 30 Vial 1    albuterol (PROVENTIL VENTOLIN) 2.5 mg /3 mL (0.083 %) nebu 3 mL by Nebulization route every six (6) hours as needed for Wheezing. 30 Nebule 1    dexAMETHasone (DECADRON) 2 mg tablet Take 10 mg on the day after tomorrow 5 Tab 0    budesonide-formoterol (SYMBICORT) 160-4.5 mcg/actuation HFAA Take 2 Puffs by inhalation two (2) times a day. 1 Inhaler 2    multivitamin (ONE A DAY) tablet Take 1 Tab by mouth daily.  ALLERGY RELIEF, CETIRIZINE, 10 mg tablet take 1 tablet by mouth once daily 90 Tab 3    omeprazole (PRILOSEC) 10 mg capsule take 1 capsule by mouth once daily 30 Cap 11    folic acid (FOLVITE) 1 mg tablet Take  by mouth daily.       glucose blood VI test strips (ONETOUCH ULTRA TEST) strip Check blood glucose as directed 100 Strip 1       Past History     Past Medical History:  Past Medical History:   Diagnosis Date    Abdominal adhesions     Adnexal cyst 7/30/2019    Left    Anemia     Asthma     Chronic pelvic pain in female 7/30/2019    Diabetes mellitus     Dyskinesia     bilateral    Essential hypertension     GERD (gastroesophageal reflux disease)     Hypertension     Menopause     Microhematuria     Rheumatoid arteritis ([facility] [address] 2018    Stool color black        Past Surgical History:  Past Surgical History:   Procedure Laterality Date    COLONOSCOPY N/A 1/21/2019    COLONOSCOPY performed by [facility]imo Garvin [facility] at [facility] ENDOSCOPY    HX CHOLECYSTECTOMY  6/28/10    HX COLONOSCOPY      HX ENDOSCOPY      HX HERNIA REPAIR      HX HYSTERECTOMY  1989    Fibroids    HX KNEE REPLACEMENT Left     HX KNEE REPLACEMENT Right 06/2018    HX OOPHORECTOMY  12/2000    HX ORTHOPAEDIC Right     ankle- multiple surgeries    HX SMALL BOWEL RESECTION  12/2000    HX SMALL BOWEL RESECTION  02/21/2017    Dr. Clara Raymundo         Family History:  Family History   Problem Relation Age of Onset    Hypertension Other         parent    Breast Cancer Other 21    Heart Disease Father     Diabetes Father     Lung Disease Father     Diabetes Mother     Hypertension Other         sibling    Diabetes Other         parent    Kidney Disease Brother     Diabetes Brother     Lung Disease Brother        Social History:  Social History     Tobacco Use    Smoking status: Never Smoker    Smokeless tobacco: Never Used   Substance Use Topics    Alcohol use: No    Drug use: No       Allergies: Allergies   Allergen Reactions    Macrodantin [Nitrofurantoin Macrocrystalline] Itching and Other (comments)    Tape [Adhesive] Other (comments)     Paper tape-- feels like burning skin  Burned skin when had wound vac         Review of Systems   Review of Systems   Constitutional: Negative. Negative for chills and fever. HENT: Negative. Negative for congestion, ear pain and rhinorrhea. Eyes: Negative. Negative for pain and redness. Respiratory: Negative. Negative for cough, shortness of breath, wheezing and stridor. Cardiovascular: Negative. Negative for chest pain and leg swelling. Gastrointestinal: Positive for abdominal pain, diarrhea, nausea and vomiting. Negative for constipation. Genitourinary: Positive for pelvic pain. Negative for dysuria and frequency. Musculoskeletal: Positive for back pain. Negative for neck pain. Skin: Negative. Negative for rash and wound. Neurological: Negative. Negative for dizziness, seizures, syncope and headaches. All other systems reviewed and are negative. All Other Systems Negative  Physical Exam     Vitals:    06/29/20 1428   BP: (!) 157/94   Pulse: 93   Resp: 15   Temp: 97.7 °F (36.5 °C)   SpO2: 98%   Weight: 68 kg (150 lb)     Physical Exam  Vitals signs and nursing note reviewed. Constitutional:       General: She is in acute distress. Appearance: She is well-developed. She is obese. She is not diaphoretic. Comments: Appears moderately distressed   HENT:      Head: Normocephalic and atraumatic. Eyes:      General: No scleral icterus. Right eye: No discharge. Left eye: No discharge. Conjunctiva/sclera: Conjunctivae normal.   Neck:      Musculoskeletal: Normal range of motion and neck supple. Cardiovascular:      Rate and Rhythm: Normal rate and regular rhythm. Heart sounds: Normal heart sounds. No murmur. No friction rub. No gallop. Pulmonary:      Effort: Pulmonary effort is normal. No respiratory distress. Breath sounds: Normal breath sounds. No stridor. No wheezing or rales. Abdominal:      General: Bowel sounds are normal. There is no distension. Palpations: Abdomen is soft. There is no mass. Tenderness: There is abdominal tenderness. There is guarding. There is no right CVA tenderness or left CVA tenderness. Hernia: No hernia is present. Comments: Diffuse abdominal tenderness to palpation with increased tenderness to palpation to the right lower quadrant as well as some guarding over this area. Scarring noted on the central lower portion of the abdomen, consistent with patient's prior history of colon surgery for bowel obstruction. No rebound tenderness or distention noted. Musculoskeletal: Normal range of motion.    Skin:     General: Skin is warm and dry. Findings: No erythema or rash. Neurological:      Mental Status: She is alert and oriented to person, place, and time. Coordination: Coordination normal.      Comments: Gait is steady and patient exhibits no evidence of ataxia. Patient is able to ambulate without difficulty. No focal neurological deficit noted. No facial droop, slurred speech, or evidence of altered mentation noted on exam.     Psychiatric:         Behavior: Behavior normal.         Thought Content: Thought content normal.                Diagnostic Study Results     Labs -     Recent Results (from the past 12 hour(s))   URINALYSIS W/ RFLX MICROSCOPIC    Collection Time: 06/29/20  2:30 PM   Result Value Ref Range    Color YELLOW      Appearance CLEAR      Specific gravity 1.026 1.005 - 1.030      pH (UA) 5.0 5.0 - 8.0      Protein Negative NEG mg/dL    Glucose >1,000 (A) NEG mg/dL    Ketone Negative NEG mg/dL    Bilirubin Negative NEG      Blood Negative NEG      Urobilinogen 0.2 0.2 - 1.0 EU/dL    Nitrites Negative NEG      Leukocyte Esterase Negative NEG     CBC WITH AUTOMATED DIFF    Collection Time: 06/29/20  3:20 PM   Result Value Ref Range    WBC 6.3 4.6 - 13.2 K/uL    RBC 4.95 4.20 - 5.30 M/uL    HGB 13.4 12.0 - 16.0 g/dL    HCT 42.2 35.0 - 45.0 %    MCV 85.3 74.0 - 97.0 FL    MCH 27.1 24.0 - 34.0 PG    MCHC 31.8 31.0 - 37.0 g/dL    RDW 15.0 (H) 11.6 - 14.5 %    PLATELET 574 950 - 591 K/uL    MPV 10.6 9.2 - 11.8 FL    NEUTROPHILS 59 40 - 73 %    LYMPHOCYTES 31 21 - 52 %    MONOCYTES 7 3 - 10 %    EOSINOPHILS 3 0 - 5 %    BASOPHILS 0 0 - 2 %    ABS. NEUTROPHILS 3.7 1.8 - 8.0 K/UL    ABS. LYMPHOCYTES 2.0 0.9 - 3.6 K/UL    ABS. MONOCYTES 0.5 0.05 - 1.2 K/UL    ABS. EOSINOPHILS 0.2 0.0 - 0.4 K/UL    ABS.  BASOPHILS 0.0 0.0 - 0.1 K/UL    DF AUTOMATED     METABOLIC PANEL, COMPREHENSIVE    Collection Time: 06/29/20  3:20 PM   Result Value Ref Range    Sodium 138 136 - 145 mmol/L    Potassium 4.2 3.5 - 5.5 mmol/L Chloride 104 100 - 111 mmol/L    CO2 29 21 - 32 mmol/L    Anion gap 5 3.0 - 18 mmol/L    Glucose 96 74 - 99 mg/dL    BUN 21 (H) 7.0 - 18 MG/DL    Creatinine 1.13 0.6 - 1.3 MG/DL    BUN/Creatinine ratio 19 12 - 20      GFR est AA 60 (L) >60 ml/min/1.73m2    GFR est non-AA 49 (L) >60 ml/min/1.73m2    Calcium 9.7 8.5 - 10.1 MG/DL    Bilirubin, total 0.3 0.2 - 1.0 MG/DL    ALT (SGPT) 19 13 - 56 U/L    AST (SGOT) 15 10 - 38 U/L    Alk. phosphatase 105 45 - 117 U/L    Protein, total 7.5 6.4 - 8.2 g/dL    Albumin 3.3 (L) 3.4 - 5.0 g/dL    Globulin 4.2 (H) 2.0 - 4.0 g/dL    A-G Ratio 0.8 0.8 - 1.7     LIPASE    Collection Time: 06/29/20  3:20 PM   Result Value Ref Range    Lipase 188 73 - 393 U/L       Radiologic Studies -   CT ABD PELV W CONT   Final Result   IMPRESSION:      Postsurgical changes from bowel resection. Large amount of stool in the colon. No acute process. Stable left adnexal cyst.       Nonspecific stranding of the omentum likely postsurgical.         CT Results  (Last 48 hours)               06/29/20 1743  CT ABD PELV W CONT Final result    Impression:  IMPRESSION:       Postsurgical changes from bowel resection. Large amount of stool in the colon. No acute process. Stable left adnexal cyst.        Nonspecific stranding of the omentum likely postsurgical.        Narrative:  EXAM: CT of the abdomen and pelvis       INDICATION: Right lower quadrant pain       COMPARISON: May 2020, June 26, 2019, September 4, 2018       TECHNIQUE: Axial CT imaging of the abdomen and pelvis was performed with   intravenous contrast. Multiplanar reformats were generated. One or more dose   reduction techniques were used on this CT: automated exposure control,   adjustment of the mAs and/or kVp according to patient size, and iterative   reconstruction techniques. The specific techniques used on this CT exam have   been documented in the patient's electronic medical record.  Digital Imaging and Communications in Medicine (DICOM) format image data are available to   nonaffiliated external healthcare facilities or entities on a secure, media   free, reciprocally searchable basis with patient authorization for at least a   12-month period after this study. _______________       FINDINGS:       LOWER CHEST: Unremarkable. LIVER, BILIARY: Liver is normal. No biliary dilation. Cholecystectomy       PANCREAS: Normal.       SPLEEN: Normal.       ADRENALS: Normal.       KIDNEYS: Right kidney is unremarkable. Left kidney penetrates parapelvic cysts   without hydronephrosis or nephrolithiasis. VASCULATURE: Mild calcific atherosclerosis present. LYMPH NODES: No enlarged lymph nodes. GASTROINTESTINAL TRACT: No bowel dilation or wall thickening. There is a large   amount of stool throughout the colon. There is no bowel obstruction. There has   been small bowel resection the left lower quadrant. There is scarring along the   midline of the abdomen. There is some stranding of the omentum likely   postsurgical. No fluid collections or free air seen. PELVIC ORGANS: Hysterectomy. There is a left adnexal cystic structure measuring   3.6 x 2.5 cm, unchanged from September 2018 study. Urinary bladder is   unremarkable. No free fluid seen. BONES: No acute or aggressive osseous abnormalities identified. OTHER: None.       _______________               CXR Results  (Last 48 hours)    None            Medical Decision Making   I am the first provider for this patient. I reviewed the vital signs, available nursing notes, past medical history, past surgical history, family history and social history. Vital Signs-Reviewed the patient's vital signs. Records Reviewed: Rebekah Bryant PA-C     Procedures:  Procedures    Provider Notes (Medical Decision Making): Impression:  Lower abdominal pain, pelvic pain, low back pain    IV inserted, morphine, zofran and IV fluids ordered. UA shows > 1000 glucose, otherwise unremarkable, sent for culture  Labs show a normal WBC, BUN 21, otherwise unremarkable. CT abd/pelvis negative for acute process, large amount of stool throughout the colon. Discussed these results with the pt at bedside. She states she received some relief from the pain medication received in the ED today. Will plan to d/c with miralax as needed, zofran, and short course of tylenol # 3. Very close PCP follow-up in 2 days recommended. Low threshold for returning to the ED recommended for any new or worsening sx. Pt agrees with this plan. Rebekah Bryant PA-C     MED RECONCILIATION:  No current facility-administered medications for this encounter. Current Outpatient Medications   Medication Sig    polyethylene glycol (Miralax) 17 gram/dose powder Take 17 g by mouth daily. 1 tablespoon with 8 oz of water daily    ondansetron (Zofran ODT) 4 mg disintegrating tablet Take 1 Tab by mouth every eight (8) hours as needed for Nausea or Vomiting.  acetaminophen-codeine (Tylenol-Codeine #3) 300-30 mg per tablet Take 1 Tab by mouth every six (6) hours as needed for Pain for up to 5 days. Max Daily Amount: 4 Tabs.  bisoprolol-hydroCHLOROthiazide (ZIAC) 2.5-6.25 mg per tablet take 1 tablet by mouth once daily    doxepin (SINEquan) 25 mg capsule Take 1 Cap by mouth nightly.  acetaminophen (TYLENOL) 325 mg tablet Take 2 Tabs by mouth every four (4) hours as needed for Pain.  metFORMIN (GLUMETZA ER) 500 mg TG24 24 hour tablet Take 1,000 mg by mouth two (2) times a day. take 1 tablet by mouth twice a day    amLODIPine (NORVASC) 5 mg tablet Take 1 Tab by mouth daily.  albuterol sulfate (PROAIR RESPICLICK) 90 mcg/actuation aepb Take 2 Puffs by inhalation every four (4) hours as needed for Wheezing, Shortness of Breath or Cough.  ipratropium (ATROVENT) 0.02 % soln 2.5 mL by Nebulization route every six (6) hours as needed for Cough.     albuterol (PROVENTIL VENTOLIN) 2.5 mg /3 mL (0.083 %) nebu 3 mL by Nebulization route every six (6) hours as needed for Wheezing.  dexAMETHasone (DECADRON) 2 mg tablet Take 10 mg on the day after tomorrow    budesonide-formoterol (SYMBICORT) 160-4.5 mcg/actuation HFAA Take 2 Puffs by inhalation two (2) times a day.  multivitamin (ONE A DAY) tablet Take 1 Tab by mouth daily.  ALLERGY RELIEF, CETIRIZINE, 10 mg tablet take 1 tablet by mouth once daily    omeprazole (PRILOSEC) 10 mg capsule take 1 capsule by mouth once daily    folic acid (FOLVITE) 1 mg tablet Take  by mouth daily.  glucose blood VI test strips (ONETOUCH ULTRA TEST) strip Check blood glucose as directed       Disposition:  TBD    DISCHARGE NOTE:   Patient is stable for discharge at this time. I have discussed all the findings from today's work up with the patient, including lab results and imaging. I have answered all questions. Rx for miralax, tylenol # 3, and zofran given. Rest and close follow-up with the PCP recommended this week. Return to the ED immediately for any new or worsening symptoms. Rebekah Anderson PA-C     Follow-up Information     Follow up With Specialties Details Why Contact Info    Joo Arzola MD [facility], Internal Medicine In 2 days  [id number] [place] [id number] [phone]      [facility] EMERGENCY DEPT Emergency Medicine  If symptoms worsen [id number] [facility]  [phone]          Current Discharge Medication List      START taking these medications    Details   acetaminophen-codeine (Tylenol-Codeine #3) 300-30 mg per tablet Take 1 Tab by mouth every six (6) hours as needed for Pain for up to 5 days. Max Daily Amount: 4 Tabs. Qty: 14 Tab, Refills: 0    Associated Diagnoses: RLQ abdominal pain         CONTINUE these medications which have CHANGED    Details   polyethylene glycol (Miralax) 17 gram/dose powder Take 17 g by mouth daily.  1 tablespoon with 8 oz of water daily  Qty: 289 g, Refills: 0    Associated Diagnoses: RLQ abdominal pain      ondansetron (Zofran ODT) 4 mg disintegrating tablet Take 1 Tab by mouth every eight (8) hours as needed for Nausea or Vomiting. Qty: 15 Tab, Refills: 0               Diagnosis     Clinical Impression:   1.  RLQ abdominal pain

## 2020-06-30 LAB
BACTERIA SPEC CULT: NORMAL
SERVICE CMNT-IMP: NORMAL

## 2020-06-30 NOTE — DISCHARGE INSTRUCTIONS
Patient Education        Abdominal Pain: Care Instructions  Your Care Instructions     Abdominal pain has many possible causes. Some aren't serious and get better on their own in a few days. Others need more testing and treatment. If your pain continues or gets worse, you need to be rechecked and may need more tests to find out what is wrong. You may need surgery to correct the problem. Don't ignore new symptoms, such as fever, nausea and vomiting, urination problems, pain that gets worse, and dizziness. These may be signs of a more serious problem. Your doctor may have recommended a follow-up visit in the next 8 to 12 hours. If you are not getting better, you may need more tests or treatment. The doctor has checked you carefully, but problems can develop later. If you notice any problems or new symptoms, get medical treatment right away. Follow-up care is a key part of your treatment and safety. Be sure to make and go to all appointments, and call your doctor if you are having problems. It's also a good idea to know your test results and keep a list of the medicines you take. How can you care for yourself at home? · Rest until you feel better. · To prevent dehydration, drink plenty of fluids, enough so that your urine is light yellow or clear like water. Choose water and other caffeine-free clear liquids until you feel better. If you have kidney, heart, or liver disease and have to limit fluids, talk with your doctor before you increase the amount of fluids you drink. · If your stomach is upset, eat mild foods, such as rice, dry toast or crackers, bananas, and applesauce. Try eating several small meals instead of two or three large ones. · Wait until 48 hours after all symptoms have gone away before you have spicy foods, alcohol, and drinks that contain caffeine. · Do not eat foods that are high in fat. · Avoid anti-inflammatory medicines such as aspirin, ibuprofen (Advil, Motrin), and naproxen (Aleve). These can cause stomach upset. Talk to your doctor if you take daily aspirin for another health problem. When should you call for help? [id number] anytime you think you may need emergency care. For example, call if:  · You passed out (lost consciousness). · You pass maroon or very bloody stools. · You vomit blood or what looks like coffee grounds. · You have new, severe belly pain. Call your doctor now or seek immediate medical care if:  · Your pain gets worse, especially if it becomes focused in one area of your belly. · You have a new or higher fever. · Your stools are black and look like tar, or they have streaks of blood. · You have unexpected vaginal bleeding. · You have symptoms of a urinary tract infection. These may include:  ? Pain when you urinate. ? Urinating more often than usual.  ? Blood in your urine. · You are dizzy or lightheaded, or you feel like you may faint. Watch closely for changes in your health, and be sure to contact your doctor if:  · You are not getting better after 1 day (24 hours). Where can you learn more? Go to http://www.gray.com/  Enter J926 in the search box to learn more about \"Abdominal Pain: Care Instructions. \"  Current as of: June 26, 2019               Content Version: 12.5  © 3309-8700 [employer]. Care instructions adapted under license by [employer] (which disclaims liability or warranty for this information). If you have questions about a medical condition or this instruction, always ask your healthcare professional. [place] [address] any warranty or liability for your use of this information. [employer] Activation    Thank you for requesting access to [employer]. Please follow the instructions below to securely access and download your online medical record.  [employer] allows you to send messages to your doctor, view your test results, renew your prescriptions, schedule appointments, and more.    How Do I Sign Up? 1. In your internet browser, go to [email or web address]  2. Click on the First Time User? Click Here link in the Sign In box. You will be redirect to the New Member Sign Up page. 3. Enter your [employer] Access Code exactly as it appears below. You will not need to use this code after youve completed the sign-up process. If you do not sign up before the expiration date, you must request a new code. [employer] Access Code: [email or web address]  Expires: 2020  5:06 PM (This is the date your [employer] access code will )    4. Enter the last four digits of your Social Security Number (xxxx) and Date of Birth (mm/dd/yyyy) as indicated and click Submit. You will be taken to the next sign-up page. 5. Create a [employer] ID. This will be your [employer] login ID and cannot be changed, so think of one that is secure and easy to remember. 6. Create a [employer] password. You can change your password at any time. 7. Enter your Password Reset Question and Answer. This can be used at a later time if you forget your password. 8. Enter your e-mail address. You will receive e-mail notification when new information is available in [address] 9. Click Sign Up. You can now view and download portions of your medical record. 10. Click the Download Summary menu link to download a portable copy of your medical information. Additional Information    If you have questions, please visit the Frequently Asked Questions section of the [employer] website at [email or web address] Remember, [employer] is NOT to be used for urgent needs. For medical emergencies, dial 911.

## 2020-06-30 NOTE — ED NOTES
I have reviewed discharge instructions with the patient. The patient verbalized understanding. Pt discharged from ED ambulatory with steady gait noted. Stable in no distress.

## 2020-07-30 ENCOUNTER — APPOINTMENT (OUTPATIENT)
Dept: GENERAL RADIOLOGY | Age: 60
End: 2020-07-30
Attending: NURSE PRACTITIONER
Payer: COMMERCIAL

## 2020-07-30 ENCOUNTER — APPOINTMENT (OUTPATIENT)
Dept: CT IMAGING | Age: 60
End: 2020-07-30
Attending: NURSE PRACTITIONER
Payer: COMMERCIAL

## 2020-07-30 ENCOUNTER — HOSPITAL ENCOUNTER (EMERGENCY)
Age: 60
Discharge: HOME | End: 2020-07-30
Attending: EMERGENCY MEDICINE
Payer: COMMERCIAL

## 2020-07-30 VITALS
OXYGEN SATURATION: 100 % | DIASTOLIC BLOOD PRESSURE: 89 MMHG | WEIGHT: 145 LBS | BODY MASS INDEX: 24.75 KG/M2 | HEIGHT: 64 IN | HEART RATE: 95 BPM | RESPIRATION RATE: 16 BRPM | SYSTOLIC BLOOD PRESSURE: 129 MMHG | TEMPERATURE: 96 F

## 2020-07-30 DIAGNOSIS — E09.9 STEROID-INDUCED DIABETES (HCC): ICD-10-CM

## 2020-07-30 DIAGNOSIS — R10.84 ABDOMINAL PAIN, GENERALIZED: Primary | ICD-10-CM

## 2020-07-30 DIAGNOSIS — T38.0X5A STEROID-INDUCED DIABETES (HCC): ICD-10-CM

## 2020-07-30 DIAGNOSIS — R11.2 NON-INTRACTABLE VOMITING WITH NAUSEA, UNSPECIFIED VOMITING TYPE: ICD-10-CM

## 2020-07-30 DIAGNOSIS — M47.9 SPONDYLOSIS: ICD-10-CM

## 2020-07-30 DIAGNOSIS — N28.1 RENAL CYST: ICD-10-CM

## 2020-07-30 DIAGNOSIS — R19.7 DIARRHEA, UNSPECIFIED TYPE: ICD-10-CM

## 2020-07-30 DIAGNOSIS — N83.202 LEFT OVARIAN CYST: ICD-10-CM

## 2020-07-30 LAB
ALBUMIN SERPL-MCNC: 3.3 G/DL (ref 3.4–5)
ALBUMIN/GLOB SERPL: 0.8 {RATIO} (ref 0.8–1.7)
ALP SERPL-CCNC: 118 U/L (ref 45–117)
ALT SERPL-CCNC: 27 U/L (ref 13–56)
ANION GAP SERPL CALC-SCNC: 6 MMOL/L (ref 3–18)
APPEARANCE UR: CLEAR
AST SERPL-CCNC: 17 U/L (ref 10–38)
BASOPHILS # BLD: 0 K/UL (ref 0–0.1)
BASOPHILS NFR BLD: 0 % (ref 0–2)
BILIRUB SERPL-MCNC: 0.3 MG/DL (ref 0.2–1)
BILIRUB UR QL: NEGATIVE
BUN SERPL-MCNC: 14 MG/DL (ref 7–18)
BUN/CREAT SERPL: 13 (ref 12–20)
CALCIUM SERPL-MCNC: 9.6 MG/DL (ref 8.5–10.1)
CHLORIDE SERPL-SCNC: 104 MMOL/L (ref 100–111)
CO2 SERPL-SCNC: 29 MMOL/L (ref 21–32)
COLOR UR: YELLOW
CREAT SERPL-MCNC: 1.09 MG/DL (ref 0.6–1.3)
DIFFERENTIAL METHOD BLD: ABNORMAL
EOSINOPHIL # BLD: 0.2 K/UL (ref 0–0.4)
EOSINOPHIL NFR BLD: 3 % (ref 0–5)
ERYTHROCYTE [DISTWIDTH] IN BLOOD BY AUTOMATED COUNT: 14.9 % (ref 11.6–14.5)
GLOBULIN SER CALC-MCNC: 4.2 G/DL (ref 2–4)
GLUCOSE SERPL-MCNC: 143 MG/DL (ref 74–99)
GLUCOSE UR STRIP.AUTO-MCNC: 250 MG/DL
HCT VFR BLD AUTO: 41.7 % (ref 35–45)
HGB BLD-MCNC: 13.4 G/DL (ref 12–16)
HGB UR QL STRIP: NEGATIVE
KETONES UR QL STRIP.AUTO: NEGATIVE MG/DL
LEUKOCYTE ESTERASE UR QL STRIP.AUTO: NEGATIVE
LIPASE SERPL-CCNC: 102 U/L (ref 73–393)
LYMPHOCYTES # BLD: 1.5 K/UL (ref 0.9–3.6)
LYMPHOCYTES NFR BLD: 24 % (ref 21–52)
MAGNESIUM SERPL-MCNC: 2.3 MG/DL (ref 1.6–2.6)
MCH RBC QN AUTO: 26.9 PG (ref 24–34)
MCHC RBC AUTO-ENTMCNC: 32.1 G/DL (ref 31–37)
MCV RBC AUTO: 83.6 FL (ref 74–97)
MONOCYTES # BLD: 0.5 K/UL (ref 0.05–1.2)
MONOCYTES NFR BLD: 8 % (ref 3–10)
NEUTS SEG # BLD: 4.1 K/UL (ref 1.8–8)
NEUTS SEG NFR BLD: 65 % (ref 40–73)
NITRITE UR QL STRIP.AUTO: NEGATIVE
PH UR STRIP: 8 [PH] (ref 5–8)
PLATELET # BLD AUTO: 321 K/UL (ref 135–420)
PMV BLD AUTO: 10.7 FL (ref 9.2–11.8)
POTASSIUM SERPL-SCNC: 4.2 MMOL/L (ref 3.5–5.5)
PROT SERPL-MCNC: 7.5 G/DL (ref 6.4–8.2)
PROT UR STRIP-MCNC: NEGATIVE MG/DL
RBC # BLD AUTO: 4.99 M/UL (ref 4.2–5.3)
SODIUM SERPL-SCNC: 139 MMOL/L (ref 136–145)
SP GR UR REFRACTOMETRY: >1.03 (ref 1–1.03)
UROBILINOGEN UR QL STRIP.AUTO: 0.2 EU/DL (ref 0.2–1)
WBC # BLD AUTO: 6.4 K/UL (ref 4.6–13.2)

## 2020-07-30 PROCEDURE — 81003 URINALYSIS AUTO W/O SCOPE: CPT

## 2020-07-30 PROCEDURE — 74011250636 HC RX REV CODE- 250/636: Performed by: NURSE PRACTITIONER

## 2020-07-30 PROCEDURE — 83735 ASSAY OF MAGNESIUM: CPT

## 2020-07-30 PROCEDURE — 74011636320 HC RX REV CODE- 636/320: Performed by: EMERGENCY MEDICINE

## 2020-07-30 PROCEDURE — 96374 THER/PROPH/DIAG INJ IV PUSH: CPT

## 2020-07-30 PROCEDURE — 74177 CT ABD & PELVIS W/CONTRAST: CPT

## 2020-07-30 PROCEDURE — 83690 ASSAY OF LIPASE: CPT

## 2020-07-30 PROCEDURE — 85025 COMPLETE CBC W/AUTO DIFF WBC: CPT

## 2020-07-30 PROCEDURE — 99283 EMERGENCY DEPT VISIT LOW MDM: CPT

## 2020-07-30 PROCEDURE — 80053 COMPREHEN METABOLIC PANEL: CPT

## 2020-07-30 RX ORDER — MORPHINE SULFATE 2 MG/ML
1 INJECTION, SOLUTION INTRAMUSCULAR; INTRAVENOUS
Status: COMPLETED | OUTPATIENT
Start: 2020-07-30 | End: 2020-07-30

## 2020-07-30 RX ADMIN — IOPAMIDOL 97 ML: 612 INJECTION, SOLUTION INTRAVENOUS at 18:08

## 2020-07-30 RX ADMIN — MORPHINE SULFATE 1 MG: 2 INJECTION, SOLUTION INTRAMUSCULAR; INTRAVENOUS at 19:57

## 2020-07-30 NOTE — ED TRIAGE NOTES
Had foot surgery Monday. abdominal pain and vomiting and diarrhea. Hx chronic abdominal pain.  Pain started early this morning

## 2020-07-30 NOTE — ED PROVIDER NOTES
EMERGENCY DEPARTMENT HISTORY AND PHYSICAL EXAM  Date: 7/30/2020  Patient Name: Hector Stevens    History of Presenting Illness     Chief Complaint   Patient presents with    Abdominal Pain    Diarrhea    Vomiting       History Provided By: Patient    Chief complaint: This is a 80-year-old female with complex abdominal history of small bowel resection in 2020 17 due to obstruction, diabetes, hypertension, GERD, dyslipidemia who presented to the ED with chief complaint of abdominal pain, nausea, vomiting ongoing since this morning. No other associated symptoms. Of note, she is status post left foot surgery on July 27, 2020. Of note, she has been seen in the ED on 6/11,  6/25, 6/29, 7/14 for complaints of abd pain, n/v, diarrhea. She has had CT abdomen/pelvis with contrast on 4/22, 5/27, 6/29 with no significant findings. She has had CT abdomen/pelvis with [facility] on 1/15, 1/27, 4/14, 5/19, 5/26, 6/11, 7/14. PCP: Mando Montenegro MD    Current Outpatient Medications   Medication Sig Dispense Refill    polyethylene glycol (Miralax) 17 gram/dose powder Take 17 g by mouth daily. 1 tablespoon with 8 oz of water daily 289 g 0    ondansetron (Zofran ODT) 4 mg disintegrating tablet Take 1 Tab by mouth every eight (8) hours as needed for Nausea or Vomiting. 15 Tab 0    bisoprolol-hydroCHLOROthiazide (ZIAC) 2.5-6.25 mg per tablet take 1 tablet by mouth once daily 90 Tab 3    doxepin (SINEquan) 25 mg capsule Take 1 Cap by mouth nightly. 90 Cap 3    acetaminophen (TYLENOL) 325 mg tablet Take 2 Tabs by mouth every four (4) hours as needed for Pain. 20 Tab 0    metFORMIN (GLUMETZA ER) 500 mg TG24 24 hour tablet Take 1,000 mg by mouth two (2) times a day. take 1 tablet by mouth twice a day 120 Tab 11    amLODIPine (NORVASC) 5 mg tablet Take 1 Tab by mouth daily.  30 Tab 0    albuterol sulfate (PROAIR RESPICLICK) 90 mcg/actuation aepb Take 2 Puffs by inhalation every four (4) hours as needed for Wheezing, Shortness of Breath or Cough. 1 Inhaler 0    ipratropium (ATROVENT) 0.02 % soln 2.5 mL by Nebulization route every six (6) hours as needed for Cough. 30 Vial 1    albuterol (PROVENTIL VENTOLIN) 2.5 mg /3 mL (0.083 %) nebu 3 mL by Nebulization route every six (6) hours as needed for Wheezing. 30 Nebule 1    dexAMETHasone (DECADRON) 2 mg tablet Take 10 mg on the day after tomorrow 5 Tab 0    budesonide-formoterol (SYMBICORT) 160-4.5 mcg/actuation HFAA Take 2 Puffs by inhalation two (2) times a day. 1 Inhaler 2    multivitamin (ONE A DAY) tablet Take 1 Tab by mouth daily.  ALLERGY RELIEF, CETIRIZINE, 10 mg tablet take 1 tablet by mouth once daily 90 Tab 3    omeprazole (PRILOSEC) 10 mg capsule take 1 capsule by mouth once daily 30 Cap 11    folic acid (FOLVITE) 1 mg tablet Take  by mouth daily.       glucose blood VI test strips (ONETOUCH ULTRA TEST) strip Check blood glucose as directed 100 Strip 1       Past History     Past Medical History:  Past Medical History:   Diagnosis Date    Abdominal adhesions     Adnexal cyst 7/30/2019    Left    Anemia     Asthma     Chronic pelvic pain in female 7/30/2019    Diabetes mellitus     Dyskinesia     bilateral    Essential hypertension     GERD (gastroesophageal reflux disease)     Hypertension     Menopause     Microhematuria     Rheumatoid arteritis ([facility] [address] 2018    Stool color black        Past Surgical History:  Past Surgical History:   Procedure Laterality Date    COLONOSCOPY N/A 1/21/2019    COLONOSCOPY performed by Kirti Garvin [facility] at [facility] ENDOSCOPY    HX CHOLECYSTECTOMY  6/28/10    HX COLONOSCOPY      HX ENDOSCOPY      HX HERNIA REPAIR      HX HYSTERECTOMY  1989    Fibroids    HX KNEE REPLACEMENT Left     HX KNEE REPLACEMENT Right 06/2018    HX OOPHORECTOMY  12/2000    HX ORTHOPAEDIC Right     ankle- multiple surgeries    HX SMALL BOWEL RESECTION  12/2000    HX SMALL BOWEL RESECTION  02/21/2017    Dr. Sai Razo LOCALIZATION-EXCISION         Family History:  Family History   Problem Relation Age of Onset    Hypertension Other         parent    Breast Cancer Other 21    Heart Disease Father     Diabetes Father     Lung Disease Father     Diabetes Mother     Hypertension Other         sibling    Diabetes Other         parent    Kidney Disease Brother     Diabetes Brother     Lung Disease Brother        Social History:  Social History     Tobacco Use    Smoking status: Never Smoker    Smokeless tobacco: Never Used   Substance Use Topics    Alcohol use: No    Drug use: No       Allergies: Allergies   Allergen Reactions    Macrodantin [Nitrofurantoin Macrocrystalline] Itching and Other (comments)    Tape [Adhesive] Other (comments)     Paper tape-- feels like burning skin  Burned skin when had wound vac         Review of Systems       Review of Systems   Constitutional: Negative for chills, diaphoresis, fatigue and fever. HENT: Negative for congestion, ear pain and sore throat. Eyes: Negative for pain. Respiratory: Negative for cough, chest tightness, shortness of breath and wheezing. Cardiovascular: Negative for chest pain, palpitations and leg swelling. Gastrointestinal: Positive for abdominal pain, diarrhea, nausea and vomiting. Negative for constipation. Genitourinary: Negative for dysuria, flank pain, hematuria, pelvic pain, vaginal bleeding and vaginal discharge. Musculoskeletal: Negative for back pain, joint swelling, neck pain and neck stiffness. Neurological: Negative for dizziness, weakness, light-headedness and headaches. Physical Exam     Visit Vitals  /89 (BP 1 Location: Right arm, BP Patient Position: At rest)   Pulse 95   Temp (!) 96 °F (35.6 °C)   Resp 16   Ht 5' 4\" (1.626 m)   Wt 65.8 kg (145 lb)   SpO2 100%   BMI 24.89 kg/m²         Physical Exam  Vitals signs and nursing note reviewed. Constitutional:       General: She is not in acute distress. Appearance: Normal appearance. She is not ill-appearing, toxic-appearing or diaphoretic. HENT:      Head: Normocephalic and atraumatic. Neck:      Musculoskeletal: Normal range of motion and neck supple. No neck rigidity or muscular tenderness. Cardiovascular:      Rate and Rhythm: Normal rate and regular rhythm. Pulses: Normal pulses. Heart sounds: Normal heart sounds. No murmur. Pulmonary:      Effort: Pulmonary effort is normal. No respiratory distress. Breath sounds: Normal breath sounds. No wheezing. Abdominal:      General: Bowel sounds are normal. There is no distension. Palpations: Abdomen is soft. Tenderness: There is generalized abdominal tenderness. There is guarding. There is no right CVA tenderness or left CVA tenderness. Musculoskeletal: Normal range of motion. Skin:     General: Skin is warm and dry. Capillary Refill: Capillary refill takes less than 2 seconds. Neurological:      General: No focal deficit present. Mental Status: She is alert and oriented to person, place, and time. Psychiatric:         Behavior: Behavior normal.       Diagnostic Study Results     Labs -  Recent Results (from the past 12 hour(s))   CBC WITH AUTOMATED DIFF    Collection Time: 07/30/20  5:19 PM   Result Value Ref Range    WBC 6.4 4.6 - 13.2 K/uL    RBC 4.99 4.20 - 5.30 M/uL    HGB 13.4 12.0 - 16.0 g/dL    HCT 41.7 35.0 - 45.0 %    MCV 83.6 74.0 - 97.0 FL    MCH 26.9 24.0 - 34.0 PG    MCHC 32.1 31.0 - 37.0 g/dL    RDW 14.9 (H) 11.6 - 14.5 %    PLATELET 326 612 - 771 K/uL    MPV 10.7 9.2 - 11.8 FL    NEUTROPHILS 65 40 - 73 %    LYMPHOCYTES 24 21 - 52 %    MONOCYTES 8 3 - 10 %    EOSINOPHILS 3 0 - 5 %    BASOPHILS 0 0 - 2 %    ABS. NEUTROPHILS 4.1 1.8 - 8.0 K/UL    ABS. LYMPHOCYTES 1.5 0.9 - 3.6 K/UL    ABS. MONOCYTES 0.5 0.05 - 1.2 K/UL    ABS. EOSINOPHILS 0.2 0.0 - 0.4 K/UL    ABS.  BASOPHILS 0.0 0.0 - 0.1 K/UL    DF AUTOMATED     METABOLIC PANEL, COMPREHENSIVE Collection Time: 07/30/20  5:19 PM   Result Value Ref Range    Sodium 139 136 - 145 mmol/L    Potassium 4.2 3.5 - 5.5 mmol/L    Chloride 104 100 - 111 mmol/L    CO2 29 21 - 32 mmol/L    Anion gap 6 3.0 - 18 mmol/L    Glucose 143 (H) 74 - 99 mg/dL    BUN 14 7.0 - 18 MG/DL    Creatinine 1.09 0.6 - 1.3 MG/DL    BUN/Creatinine ratio 13 12 - 20      GFR est AA >60 >60 ml/min/1.73m2    GFR est non-AA 51 (L) >60 ml/min/1.73m2    Calcium 9.6 8.5 - 10.1 MG/DL    Bilirubin, total 0.3 0.2 - 1.0 MG/DL    ALT (SGPT) 27 13 - 56 U/L    AST (SGOT) 17 10 - 38 U/L    Alk. phosphatase 118 (H) 45 - 117 U/L    Protein, total 7.5 6.4 - 8.2 g/dL    Albumin 3.3 (L) 3.4 - 5.0 g/dL    Globulin 4.2 (H) 2.0 - 4.0 g/dL    A-G Ratio 0.8 0.8 - 1.7     LIPASE    Collection Time: 07/30/20  5:19 PM   Result Value Ref Range    Lipase 102 73 - 393 U/L   MAGNESIUM    Collection Time: 07/30/20  5:19 PM   Result Value Ref Range    Magnesium 2.3 1.6 - 2.6 mg/dL       Radiologic Studies -   CT ABD PELV W CONT    (Results Pending)         Medical Decision Making   I am the first provider for this patient. I reviewed the vital signs, available nursing notes, past medical history, past surgical history, family history and social history. Vital Signs-Reviewed the patient's vital signs. Records Reviewed: Nursing Notes and Old Medical Records (Time of Review: 7:46 PM)    ED Course: Progress Notes, Reevaluation, and Consults:      Provider Notes (Medical Decision Making):     Ddx: Cholecystitis, appendicitis, intestinal obstruction, diverticulitis, nephrolithiasis. CBC unremarkable. CMP reassuring with normal ALT/AST, total bilirubin lipase, and  Magnesium. Urinalysis and CT with contrast pending. If no acute findings on CT and urinalysis. Will discharge to home with GI follow-up. Extensive review of return precautions discussed with patient prior to discharge.  Discussed importance of PCP follow up / reassessment and need to return to ED immediately should symptoms worsen. Patient verbalizes understanding of these instructions and is in agreement with plan. 8:05 PM : Pt care transferred to [facility]  ,ED provider. History of patient complaint(s), available diagnostic reports and current treatment plan has been discussed thoroughly. Diagnosis     Clinical Impression:   1. Abdominal pain, generalized    2. Non-intractable vomiting with nausea, unspecified vomiting type    3. Diarrhea, unspecified type        Disposition: home       Follow-up Information    None          Patient's Medications   Start Taking    No medications on file   Continue Taking    ACETAMINOPHEN (TYLENOL) 325 MG TABLET    Take 2 Tabs by mouth every four (4) hours as needed for Pain. ALBUTEROL (PROVENTIL VENTOLIN) 2.5 MG /3 ML (0.083 %) NEBU    3 mL by Nebulization route every six (6) hours as needed for Wheezing. ALBUTEROL SULFATE (PROAIR RESPICLICK) 90 MCG/ACTUATION AEPB    Take 2 Puffs by inhalation every four (4) hours as needed for Wheezing, Shortness of Breath or Cough. ALLERGY RELIEF, CETIRIZINE, 10 MG TABLET    take 1 tablet by mouth once daily    AMLODIPINE (NORVASC) 5 MG TABLET    Take 1 Tab by mouth daily. BISOPROLOL-HYDROCHLOROTHIAZIDE (ZIAC) 2.5-6.25 MG PER TABLET    take 1 tablet by mouth once daily    BUDESONIDE-FORMOTEROL (SYMBICORT) 160-4.5 MCG/ACTUATION HFAA    Take 2 Puffs by inhalation two (2) times a day. DEXAMETHASONE (DECADRON) 2 MG TABLET    Take 10 mg on the day after tomorrow    DOXEPIN (SINEQUAN) 25 MG CAPSULE    Take 1 Cap by mouth nightly. FOLIC ACID (FOLVITE) 1 MG TABLET    Take  by mouth daily. GLUCOSE BLOOD VI TEST STRIPS (ONETOUCH ULTRA TEST) STRIP    Check blood glucose as directed    IPRATROPIUM (ATROVENT) 0.02 % SOLN    2.5 mL by Nebulization route every six (6) hours as needed for Cough. METFORMIN (GLUMETZA ER) 500 MG TG24 24 HOUR TABLET    Take 1,000 mg by mouth two (2) times a day.  take 1 tablet by mouth twice a day    MULTIVITAMIN (ONE A DAY) TABLET    Take 1 Tab by mouth daily. OMEPRAZOLE (PRILOSEC) 10 MG CAPSULE    take 1 capsule by mouth once daily    ONDANSETRON (ZOFRAN ODT) 4 MG DISINTEGRATING TABLET    Take 1 Tab by mouth every eight (8) hours as needed for Nausea or Vomiting. POLYETHYLENE GLYCOL (MIRALAX) 17 GRAM/DOSE POWDER    Take 17 g by mouth daily. 1 tablespoon with 8 oz of water daily   These Medications have changed    No medications on file   Stop Taking    No medications on file       Dictation disclaimer:  Please note that this dictation was completed with [employer], the [employer] voice recognition software. Quite often unanticipated grammatical, syntax, homophones, and other interpretive errors are inadvertently transcribed by the computer software. Please disregard these errors. Please excuse any errors that have escaped final proofreading.

## 2020-07-31 ENCOUNTER — TELEPHONE (OUTPATIENT)
Dept: CASE MANAGEMENT | Age: 60
End: 2020-07-31

## 2020-07-31 RX ORDER — METFORMIN HYDROCHLORIDE 500 MG/1
TABLET, EXTENDED RELEASE ORAL
Qty: 120 TAB | Refills: 5 | Status: SHIPPED | OUTPATIENT
Start: 2020-07-31 | End: 2021-01-27 | Stop reason: ALTCHOICE

## 2020-07-31 NOTE — TELEPHONE ENCOUNTER
Date/Time:  7/31/2020 10:25 AM  Attempted to reach patient by telephone. Left HIPPA compliant message requesting a return call. Will attempt to reach patient again.

## 2020-07-31 NOTE — DISCHARGE INSTRUCTIONS
Patient Education        Diarrhea: Care Instructions  Your Care Instructions        Diarrhea is loose, watery stools (bowel movements). The exact cause is often hard to find. Sometimes diarrhea is your body's way of getting rid of what caused an upset stomach. Viruses, food poisoning, and many medicines can cause diarrhea. Some people get diarrhea in response to emotional stress, anxiety, or certain foods. Almost everyone has diarrhea now and then. It usually isn't serious, and your stools will return to normal soon. The important thing to do is replace the fluids you have lost, so you can prevent dehydration. The doctor has checked you carefully, but problems can develop later. If you notice any problems or new symptoms, get medical treatment right away. Follow-up care is a key part of your treatment and safety. Be sure to make and go to all appointments, and call your doctor if you are having problems. It's also a good idea to know your test results and keep a list of the medicines you take. How can you care for yourself at home? · Watch for signs of dehydration, which means your body has lost too much water. Dehydration is a serious condition and should be treated right away. Signs of dehydration are:  ? Increasing thirst and dry eyes and mouth. ? Feeling faint or lightheaded. ? A smaller amount of urine than normal.  · To prevent dehydration, drink plenty of fluids. Choose water and other caffeine-free clear liquids until you feel better. If you have kidney, heart, or liver disease and have to limit fluids, talk with your doctor before you increase the amount of fluids you drink. · Begin eating small amounts of mild foods the next day, if you feel like it. ? Try yogurt that has live cultures of Lactobacillus. (Check the label.)  ? Avoid spicy foods, fruits, alcohol, and caffeine until 48 hours after all symptoms are gone. ? Avoid chewing gum that contains sorbitol. ?  Avoid dairy products (except for yogurt with Lactobacillus) while you have diarrhea and for 3 days after symptoms are gone. · The doctor may recommend that you take over-the-counter medicine, such as loperamide (Imodium), if you still have diarrhea after 6 hours. Read and follow all instructions on the label. Do not use this medicine if you have bloody diarrhea, a high fever, or other signs of serious illness. Call your doctor if you think you are having a problem with your medicine. When should you call for help? [id number] anytime you think you may need emergency care. For example, call if:  · You passed out (lost consciousness). · Your stools are maroon or very bloody. Call your doctor now or seek immediate medical care if:  · You are dizzy or lightheaded, or you feel like you may faint. · Your stools are black and look like tar, or they have streaks of blood. · You have new or worse belly pain. · You have symptoms of dehydration, such as:  ? Dry eyes and a dry mouth. ? Passing only a little dark urine. ? Feeling thirstier than usual.  · You have a new or higher fever. Watch closely for changes in your health, and be sure to contact your doctor if:  · Your diarrhea is getting worse. · You see pus in the diarrhea. · You are not getting better after 2 days (48 hours). Where can you learn more? Go to http://betzy-didi.info/  Enter [id number] in the search box to learn more about \"Diarrhea: Care Instructions. \"  Current as of: June 26, 2019               Content Version: 12.5  © 0422-2245 [employer]. Care instructions adapted under license by [employer] (which disclaims liability or warranty for this information). If you have questions about a medical condition or this instruction, always ask your healthcare professional. [place] [address] any warranty or liability for your use of this information.          Patient Education        Abdominal Pain: Care Instructions  Your Care Instructions     Abdominal pain has many possible causes. Some aren't serious and get better on their own in a few days. Others need more testing and treatment. If your pain continues or gets worse, you need to be rechecked and may need more tests to find out what is wrong. You may need surgery to correct the problem. Don't ignore new symptoms, such as fever, nausea and vomiting, urination problems, pain that gets worse, and dizziness. These may be signs of a more serious problem. Your doctor may have recommended a follow-up visit in the next 8 to 12 hours. If you are not getting better, you may need more tests or treatment. The doctor has checked you carefully, but problems can develop later. If you notice any problems or new symptoms, get medical treatment right away. Follow-up care is a key part of your treatment and safety. Be sure to make and go to all appointments, and call your doctor if you are having problems. It's also a good idea to know your test results and keep a list of the medicines you take. How can you care for yourself at home? · Rest until you feel better. · To prevent dehydration, drink plenty of fluids, enough so that your urine is light yellow or clear like water. Choose water and other caffeine-free clear liquids until you feel better. If you have kidney, heart, or liver disease and have to limit fluids, talk with your doctor before you increase the amount of fluids you drink. · If your stomach is upset, eat mild foods, such as rice, dry toast or crackers, bananas, and applesauce. Try eating several small meals instead of two or three large ones. · Wait until 48 hours after all symptoms have gone away before you have spicy foods, alcohol, and drinks that contain caffeine. · Do not eat foods that are high in fat. · Avoid anti-inflammatory medicines such as aspirin, ibuprofen (Advil, Motrin), and naproxen (Aleve). These can cause stomach upset.  Talk to your doctor if you take daily aspirin for another health problem. When should you call for help? [id number] anytime you think you may need emergency care. For example, call if:  · You passed out (lost consciousness). · You pass maroon or very bloody stools. · You vomit blood or what looks like coffee grounds. · You have new, severe belly pain. Call your doctor now or seek immediate medical care if:  · Your pain gets worse, especially if it becomes focused in one area of your belly. · You have a new or higher fever. · Your stools are black and look like tar, or they have streaks of blood. · You have unexpected vaginal bleeding. · You have symptoms of a urinary tract infection. These may include:  ? Pain when you urinate. ? Urinating more often than usual.  ? Blood in your urine. · You are dizzy or lightheaded, or you feel like you may faint. Watch closely for changes in your health, and be sure to contact your doctor if:  · You are not getting better after 1 day (24 hours). Where can you learn more? Go to http://betzy-didi.info/  Enter N396 in the search box to learn more about \"Abdominal Pain: Care Instructions. \"  Current as of: June 26, 2019               Content Version: 12.5  © 9449-1178 [employer]. Care instructions adapted under license by [employer] (which disclaims liability or warranty for this information). If you have questions about a medical condition or this instruction, always ask your healthcare professional. [place] [address] any warranty or liability for your use of this information. Patient Education        Diarrhea in Children: Care Instructions  Your Care Instructions     Diarrhea is loose, watery stools (bowel movements). Your child gets diarrhea when the intestines push stools through before the body can soak up the water in the stools. It causes your child to have bowel movements more often. Almost everyone has diarrhea now and then.  It usually isn't serious. Diarrhea often is the body's way of getting rid of the bacteria or toxins that cause the diarrhea. But if your child has diarrhea, watch him or her closely. Children can get dehydrated quickly if they lose too much fluid through diarrhea. Sometimes they can't drink enough fluids to replace lost fluids. The doctor has checked your child carefully, but problems can develop later. If you notice any problems or new symptoms, get medical treatment right away. Follow-up care is a key part of your child's treatment and safety. Be sure to make and go to all appointments, and call your doctor if your child is having problems. It's also a good idea to know your child's test results and keep a list of the medicines your child takes. How can you care for your child at home? · Watch for and treat signs of dehydration, which means the body has lost too much water. As your child becomes dehydrated, thirst increases, and his or her mouth or eyes may feel very dry. Your child may also lack energy and want to be held a lot. He or she will not need to urinate as often as usual.  · Offer your child his or her usual foods. Your child will likely be able to eat those foods within a day or two after being sick. · If your child is dehydrated, give him or her an oral rehydration solution, such as Pedialyte or Infalyte, to replace fluid lost from diarrhea. These drinks contain the right mix of salt, sugar, and minerals to help correct dehydration. You can buy them at drugstores or grocery stores in the baby care section. Give these drinks to your child as long as he or she has diarrhea. Do not use these drinks as the only source of liquids or food for more than 12 to 24 hours. · Do not give your child over-the-counter antidiarrhea or upset-stomach medicines without talking to your doctor first. Zondra Friday not give bismuth (Pepto-Bismol) or other medicines that contain salicylates, a form of aspirin, or aspirin.  Aspirin has been linked to Reye syndrome, a serious illness. · Wash your hands after you change diapers and before you touch food. Have your child wash his or her hands after using the toilet and before eating. · Make sure that your child rests. Keep your child at home as long as he or she has a fever. · If your child is younger than age 3 or weighs less than 24 pounds, follow your doctor's advice about the amount of medicine to give your child. When should you call for help? [id number] anytime you think your child may need emergency care. For example, call if:  · Your child passes out (loses consciousness). · Your child is confused, does not know where he or she is, or is extremely sleepy or hard to wake up. · Your child passes maroon or very bloody stools. Call your doctor now or seek immediate medical care if:  · Your child has signs of needing more fluids. These signs include sunken eyes with few tears, a dry mouth with little or no spit, and little or no urine for 8 or more hours. · Your child has new or worse belly pain. · Your child's stools are black and look like tar, or they have streaks of blood. · Your child has a new or higher fever. · Your child has severe diarrhea. (This means large, loose bowel movements every 1 to 2 hours.)  Watch closely for changes in your child's health, and be sure to contact your doctor if:  · Your child's diarrhea is getting worse. · Your child is not getting better after 2 days (48 hours). · You have questions or are worried about your child's illness. Where can you learn more? Go to http://betzy-didi.info/  Enter L355 in the search box to learn more about \"Diarrhea in Children: Care Instructions. \"  Current as of: June 26, 2019               Content Version: 12.5  © 2758-0832 Healthwise, Incorporated. Care instructions adapted under license by [employer] (which disclaims liability or warranty for this information).  If you have questions about a medical condition or this instruction, always ask your healthcare professional. [place] [address] any warranty or liability for your use of this information.

## 2020-08-04 NOTE — TELEPHONE ENCOUNTER
Patient resolved from Transition of Care episode on 8/4/2020  Discussed COVID-19 related testing which was not done at this time. Test results were not done. Patient informed of results, if available? Unable to reach patient       No further outreach scheduled with this CTN/ACM/LPN/HC/ MA.   Episode of Care resolved    Called patient twice requesting a return call patient never returned my call

## 2020-08-25 ENCOUNTER — HOSPITAL ENCOUNTER (EMERGENCY)
Age: 60
Discharge: HOME | End: 2020-08-25
Attending: EMERGENCY MEDICINE
Payer: COMMERCIAL

## 2020-08-25 VITALS
HEIGHT: 64 IN | OXYGEN SATURATION: 97 % | DIASTOLIC BLOOD PRESSURE: 64 MMHG | TEMPERATURE: 98.6 F | BODY MASS INDEX: 25.61 KG/M2 | SYSTOLIC BLOOD PRESSURE: 105 MMHG | RESPIRATION RATE: 20 BRPM | HEART RATE: 89 BPM | WEIGHT: 150 LBS

## 2020-08-25 DIAGNOSIS — R10.31 RECURRENT RIGHT LOWER QUADRANT ABDOMINAL PAIN: Primary | ICD-10-CM

## 2020-08-25 DIAGNOSIS — R11.2 NON-INTRACTABLE VOMITING WITH NAUSEA, UNSPECIFIED VOMITING TYPE: ICD-10-CM

## 2020-08-25 LAB
ALBUMIN SERPL-MCNC: 3.7 G/DL (ref 3.4–5)
ALBUMIN/GLOB SERPL: 0.9 {RATIO} (ref 0.8–1.7)
ALP SERPL-CCNC: 130 U/L (ref 45–117)
ALT SERPL-CCNC: 33 U/L (ref 13–56)
ANION GAP SERPL CALC-SCNC: 7 MMOL/L (ref 3–18)
APPEARANCE UR: CLEAR
AST SERPL-CCNC: 14 U/L (ref 10–38)
BASOPHILS # BLD: 0 K/UL (ref 0–0.1)
BASOPHILS NFR BLD: 0 % (ref 0–2)
BILIRUB SERPL-MCNC: 0.2 MG/DL (ref 0.2–1)
BILIRUB UR QL: NEGATIVE
BUN SERPL-MCNC: 16 MG/DL (ref 7–18)
BUN/CREAT SERPL: 15 (ref 12–20)
CALCIUM SERPL-MCNC: 9.7 MG/DL (ref 8.5–10.1)
CHLORIDE SERPL-SCNC: 103 MMOL/L (ref 100–111)
CO2 SERPL-SCNC: 28 MMOL/L (ref 21–32)
COLOR UR: YELLOW
CREAT SERPL-MCNC: 1.06 MG/DL (ref 0.6–1.3)
DIFFERENTIAL METHOD BLD: ABNORMAL
EOSINOPHIL # BLD: 0.2 K/UL (ref 0–0.4)
EOSINOPHIL NFR BLD: 2 % (ref 0–5)
ERYTHROCYTE [DISTWIDTH] IN BLOOD BY AUTOMATED COUNT: 15.1 % (ref 11.6–14.5)
GLOBULIN SER CALC-MCNC: 4.3 G/DL (ref 2–4)
GLUCOSE SERPL-MCNC: 141 MG/DL (ref 74–99)
GLUCOSE UR STRIP.AUTO-MCNC: >1000 MG/DL
HCT VFR BLD AUTO: 45.8 % (ref 35–45)
HGB BLD-MCNC: 14.6 G/DL (ref 12–16)
HGB UR QL STRIP: NEGATIVE
KETONES UR QL STRIP.AUTO: ABNORMAL MG/DL
LEUKOCYTE ESTERASE UR QL STRIP.AUTO: NEGATIVE
LIPASE SERPL-CCNC: 178 U/L (ref 73–393)
LYMPHOCYTES # BLD: 2.1 K/UL (ref 0.9–3.6)
LYMPHOCYTES NFR BLD: 30 % (ref 21–52)
MCH RBC QN AUTO: 26.9 PG (ref 24–34)
MCHC RBC AUTO-ENTMCNC: 31.9 G/DL (ref 31–37)
MCV RBC AUTO: 84.5 FL (ref 74–97)
MONOCYTES # BLD: 0.5 K/UL (ref 0.05–1.2)
MONOCYTES NFR BLD: 7 % (ref 3–10)
NEUTS SEG # BLD: 4.2 K/UL (ref 1.8–8)
NEUTS SEG NFR BLD: 61 % (ref 40–73)
NITRITE UR QL STRIP.AUTO: NEGATIVE
PH UR STRIP: 6 [PH] (ref 5–8)
PLATELET # BLD AUTO: 266 K/UL (ref 135–420)
PMV BLD AUTO: 10.3 FL (ref 9.2–11.8)
POTASSIUM SERPL-SCNC: 3.9 MMOL/L (ref 3.5–5.5)
PROT SERPL-MCNC: 8 G/DL (ref 6.4–8.2)
PROT UR STRIP-MCNC: NEGATIVE MG/DL
RBC # BLD AUTO: 5.42 M/UL (ref 4.2–5.3)
SODIUM SERPL-SCNC: 138 MMOL/L (ref 136–145)
SP GR UR REFRACTOMETRY: >1.03 (ref 1–1.03)
UROBILINOGEN UR QL STRIP.AUTO: 1 EU/DL (ref 0.2–1)
WBC # BLD AUTO: 6.9 K/UL (ref 4.6–13.2)

## 2020-08-25 PROCEDURE — 96375 TX/PRO/DX INJ NEW DRUG ADDON: CPT

## 2020-08-25 PROCEDURE — 83690 ASSAY OF LIPASE: CPT

## 2020-08-25 PROCEDURE — 81003 URINALYSIS AUTO W/O SCOPE: CPT

## 2020-08-25 PROCEDURE — 74011250636 HC RX REV CODE- 250/636: Performed by: EMERGENCY MEDICINE

## 2020-08-25 PROCEDURE — 94762 N-INVAS EAR/PLS OXIMTRY CONT: CPT

## 2020-08-25 PROCEDURE — 96361 HYDRATE IV INFUSION ADD-ON: CPT

## 2020-08-25 PROCEDURE — 96374 THER/PROPH/DIAG INJ IV PUSH: CPT

## 2020-08-25 PROCEDURE — 99283 EMERGENCY DEPT VISIT LOW MDM: CPT

## 2020-08-25 PROCEDURE — 80053 COMPREHEN METABOLIC PANEL: CPT

## 2020-08-25 PROCEDURE — 85025 COMPLETE CBC W/AUTO DIFF WBC: CPT

## 2020-08-25 PROCEDURE — 74011250637 HC RX REV CODE- 250/637: Performed by: EMERGENCY MEDICINE

## 2020-08-25 RX ORDER — DICYCLOMINE HYDROCHLORIDE 10 MG/1
20 CAPSULE ORAL
Status: COMPLETED | OUTPATIENT
Start: 2020-08-25 | End: 2020-08-25

## 2020-08-25 RX ORDER — KETOROLAC TROMETHAMINE 30 MG/ML
30 INJECTION, SOLUTION INTRAMUSCULAR; INTRAVENOUS
Status: COMPLETED | OUTPATIENT
Start: 2020-08-25 | End: 2020-08-25

## 2020-08-25 RX ORDER — DICYCLOMINE HYDROCHLORIDE 20 MG/1
TABLET ORAL
COMMUNITY
Start: 2020-06-04 | End: 2020-09-13

## 2020-08-25 RX ORDER — ONDANSETRON 2 MG/ML
4 INJECTION INTRAMUSCULAR; INTRAVENOUS
Status: COMPLETED | OUTPATIENT
Start: 2020-08-25 | End: 2020-08-25

## 2020-08-25 RX ORDER — MORPHINE SULFATE 2 MG/ML
2 INJECTION, SOLUTION INTRAMUSCULAR; INTRAVENOUS
Status: COMPLETED | OUTPATIENT
Start: 2020-08-25 | End: 2020-08-25

## 2020-08-25 RX ADMIN — ONDANSETRON 4 MG: 2 INJECTION INTRAMUSCULAR; INTRAVENOUS at 21:11

## 2020-08-25 RX ADMIN — DICYCLOMINE HYDROCHLORIDE 20 MG: 10 CAPSULE ORAL at 22:55

## 2020-08-25 RX ADMIN — MORPHINE SULFATE 2 MG: 2 INJECTION, SOLUTION INTRAMUSCULAR; INTRAVENOUS at 21:11

## 2020-08-25 RX ADMIN — KETOROLAC TROMETHAMINE 30 MG: 30 INJECTION, SOLUTION INTRAMUSCULAR at 22:55

## 2020-08-25 RX ADMIN — SODIUM CHLORIDE 1000 ML: 900 INJECTION, SOLUTION INTRAVENOUS at 21:11

## 2020-08-25 NOTE — ED TRIAGE NOTES
Pt to triage c/o 3 days of right sided abd pain with nausea and poor appetite but no vomiting. Pt tearful in triage states pain comes and goes and seems to be aggravated by movement/position. States normal BM earlier today. Denies fevers but states \"chills\" x 3 days. No known sick contacts. Denies urinary and vaginal symptoms    Hx of multiple SBO and abd surgeries. States she tried bentyl with no relief.

## 2020-08-26 ENCOUNTER — TELEPHONE (OUTPATIENT)
Dept: CASE MANAGEMENT | Age: 60
End: 2020-08-26

## 2020-08-26 NOTE — ED NOTES
Pt has no complaints at time of dc, pt agreeable to dc plan, wheeled to ed lobby by RN to meet  (vijay), pt in nad at time of dc.

## 2020-08-26 NOTE — ED PROVIDER NOTES
Tonny Mariscal is a 61 y.o. female with history of recurrent abdominal pain with complaints of recurrent pain for the last day on the right lower side with associated nausea and vomiting. No issues with bowel movements to include no diarrhea blood in her stool or melena. No urinary symptoms. Denies any fever. Similar episodes in the past.  Patient is had multiple abdominal surgeries. She has been seen multiple times for abdominal issues in the past.  She did not take anything today for her symptoms. Pain is sharp and stabbing and worse with movement and palpation. The history is provided by the patient and medical records.         Past Medical History:   Diagnosis Date    Abdominal adhesions     Adnexal cyst 7/30/2019    Left    Anemia     Asthma     Chronic pelvic pain in female 7/30/2019    Diabetes mellitus     Dyskinesia     bilateral    Essential hypertension     GERD (gastroesophageal reflux disease)     Hypertension     Menopause     Microhematuria     Rheumatoid arteritis ([facility] [address] 2018    Stool color black        Past Surgical History:   Procedure Laterality Date    COLONOSCOPY N/A 1/21/2019    COLONOSCOPY performed by Radhika Garvin MD at [facility] ENDOSCOPY    HX CHOLECYSTECTOMY  6/28/10    HX COLONOSCOPY      HX ENDOSCOPY      HX HERNIA REPAIR      HX HYSTERECTOMY  1989    Fibroids    HX KNEE REPLACEMENT Left     HX KNEE REPLACEMENT Right 06/2018    HX OOPHORECTOMY  12/2000    HX ORTHOPAEDIC Right     ankle- multiple surgeries    HX SMALL BOWEL RESECTION  12/2000    HX SMALL BOWEL RESECTION  02/21/2017    Dr. Jason Duron           Family History:   Problem Relation Age of Onset    Hypertension Other         parent    Breast Cancer Other 21    Heart Disease Father     Diabetes Father     Lung Disease Father     Diabetes Mother     Hypertension Other         sibling    Diabetes Other         parent    Kidney Disease Brother     Diabetes Brother     Lung Disease Brother        Social History     Socioeconomic History    Marital status:      Spouse name: Not on file    Number of children: Not on file    Years of education: Not on file    Highest education level: Not on file   Occupational History    Not on file   Social Needs    Financial resource strain: Not on file    Food insecurity     Worry: Not on file     Inability: Not on file   [employer] needs     Medical: Not on file     Non-medical: Not on file   Tobacco Use    Smoking status: Never Smoker    Smokeless tobacco: Never Used   Substance and Sexual Activity    Alcohol use: No    Drug use: No    Sexual activity: Yes     Partners: Male     Birth control/protection: None   Lifestyle    Physical activity     Days per week: Not on file     Minutes per session: Not on file    Stress: Not on file   Relationships    Social connections     Talks on phone: Not on file     Gets together: Not on file     Attends Muslim service: Not on file     Active member of club or organization: Not on file     Attends meetings of clubs or organizations: Not on file     Relationship status: Not on file    Intimate partner violence     Fear of current or ex partner: Not on file     Emotionally abused: Not on file     Physically abused: Not on file     Forced sexual activity: Not on file   Other Topics Concern    Not on file   Social History Narrative    Not on file         ALLERGIES: Macrodantin [nitrofurantoin macrocrystalline] and Tape [adhesive]    Review of Systems   Constitutional: Positive for appetite change. Negative for fever. HENT: Negative for sore throat. Eyes: Negative for visual disturbance. Respiratory: Negative for cough and shortness of breath. Cardiovascular: Negative for chest pain. Gastrointestinal: Positive for abdominal pain. Negative for blood in stool and constipation. Genitourinary: Negative for difficulty urinating and dysuria. Musculoskeletal: Negative for gait problem. Skin: Negative for rash. Allergic/Immunologic: Negative for immunocompromised state. Neurological: Negative for syncope. Psychiatric/Behavioral: Positive for sleep disturbance. Vitals:    08/25/20 2120 08/25/20 2130 08/25/20 2140 08/25/20 2210   BP: 116/77 114/71 110/69 110/72   Pulse:       Resp:       Temp:       SpO2: 98% 99% 100% 99%   Weight:       Height:                Physical Exam  Vitals signs and nursing note reviewed. Constitutional:       General: She is in acute distress. Appearance: She is well-developed. She is not ill-appearing, toxic-appearing or diaphoretic. HENT:      Head: Normocephalic and atraumatic. Right Ear: External ear normal.      Left Ear: External ear normal.      Nose: Nose normal.      Mouth/Throat:      Pharynx: Uvula midline. Eyes:      General: No scleral icterus. Conjunctiva/sclera: Conjunctivae normal.   Neck:      Musculoskeletal: Neck supple. Cardiovascular:      Rate and Rhythm: Normal rate and regular rhythm. Heart sounds: Normal heart sounds. Pulmonary:      Effort: Pulmonary effort is normal.      Breath sounds: Normal breath sounds. Abdominal:      General: Abdomen is protuberant. Palpations: Abdomen is soft. There is no hepatomegaly or splenomegaly. Tenderness: There is abdominal tenderness in the right lower quadrant. There is guarding. There is no right CVA tenderness, left CVA tenderness or rebound. Positive signs include McBurney's sign. Negative signs include Feldman's sign. Musculoskeletal:      Right lower leg: No edema. Left lower leg: No edema. Skin:     General: Skin is warm and dry. Capillary Refill: Capillary refill takes less than 2 seconds. Neurological:      Mental Status: She is alert and oriented to person, place, and time.       Gait: Gait normal.   Psychiatric:         Behavior: Behavior normal.          MDM Procedures    Vitals:  Patient Vitals for the past 12 hrs:   Temp Pulse Resp BP SpO2   08/25/20 2210    110/72 99 %   08/25/20 2140    110/69 100 %   08/25/20 2130    114/71 99 %   08/25/20 2120    116/77 98 %   08/25/20 2110    120/73    08/25/20 1944 98.6 °F (37 °C) (!) 104 20 (!) 139/101 98 %         Medications ordered:   Medications   dicyclomine (BENTYL) capsule 20 mg (has no administration in time range)   ketorolac (TORADOL) injection 30 mg (has no administration in time range)   sodium chloride 0.9 % bolus infusion 1,000 mL (1,000 mL IntraVENous New Bag 8/25/20 2111)   ondansetron (ZOFRAN) injection 4 mg (4 mg IntraVENous Given 8/25/20 2111)   morphine injection 2 mg (2 mg IntraVENous Given 8/25/20 2111)         Lab findings:  Recent Results (from the past 12 hour(s))   URINALYSIS W/ RFLX MICROSCOPIC    Collection Time: 08/25/20  7:45 PM   Result Value Ref Range    Color YELLOW      Appearance CLEAR      Specific gravity >1.030 (H) 1.005 - 1.030    pH (UA) 6.0 5.0 - 8.0      Protein Negative NEG mg/dL    Glucose >1,000 (A) NEG mg/dL    Ketone TRACE (A) NEG mg/dL    Bilirubin Negative NEG      Blood Negative NEG      Urobilinogen 1.0 0.2 - 1.0 EU/dL    Nitrites Negative NEG      Leukocyte Esterase Negative NEG     CBC WITH AUTOMATED DIFF    Collection Time: 08/25/20  9:05 PM   Result Value Ref Range    WBC 6.9 4.6 - 13.2 K/uL    RBC 5.42 (H) 4.20 - 5.30 M/uL    HGB 14.6 12.0 - 16.0 g/dL    HCT 45.8 (H) 35.0 - 45.0 %    MCV 84.5 74.0 - 97.0 FL    MCH 26.9 24.0 - 34.0 PG    MCHC 31.9 31.0 - 37.0 g/dL    RDW 15.1 (H) 11.6 - 14.5 %    PLATELET 318 042 - 631 K/uL    MPV 10.3 9.2 - 11.8 FL    NEUTROPHILS 61 40 - 73 %    LYMPHOCYTES 30 21 - 52 %    MONOCYTES 7 3 - 10 %    EOSINOPHILS 2 0 - 5 %    BASOPHILS 0 0 - 2 %    ABS. NEUTROPHILS 4.2 1.8 - 8.0 K/UL    ABS. LYMPHOCYTES 2.1 0.9 - 3.6 K/UL    ABS. MONOCYTES 0.5 0.05 - 1.2 K/UL    ABS. EOSINOPHILS 0.2 0.0 - 0.4 K/UL    ABS.  BASOPHILS 0.0 0.0 - 0.1 K/UL    DF AUTOMATED     METABOLIC PANEL, COMPREHENSIVE    Collection Time: 08/25/20  9:05 PM   Result Value Ref Range    Sodium 138 136 - 145 mmol/L    Potassium 3.9 3.5 - 5.5 mmol/L    Chloride 103 100 - 111 mmol/L    CO2 28 21 - 32 mmol/L    Anion gap 7 3.0 - 18 mmol/L    Glucose 141 (H) 74 - 99 mg/dL    BUN 16 7.0 - 18 MG/DL    Creatinine 1.06 0.6 - 1.3 MG/DL    BUN/Creatinine ratio 15 12 - 20      GFR est AA >60 >60 ml/min/1.73m2    GFR est non-AA 53 (L) >60 ml/min/1.73m2    Calcium 9.7 8.5 - 10.1 MG/DL    Bilirubin, total 0.2 0.2 - 1.0 MG/DL    ALT (SGPT) 33 13 - 56 U/L    AST (SGOT) 14 10 - 38 U/L    Alk. phosphatase 130 (H) 45 - 117 U/L    Protein, total 8.0 6.4 - 8.2 g/dL    Albumin 3.7 3.4 - 5.0 g/dL    Globulin 4.3 (H) 2.0 - 4.0 g/dL    A-G Ratio 0.9 0.8 - 1.7     LIPASE    Collection Time: 08/25/20  9:05 PM   Result Value Ref Range    Lipase 178 73 - 393 U/L       EKG interpretation by ED Physician:      X-Ray, CT or other radiology findings or impressions:  No orders to display   Pulse ox: Under percent room air, normal    Progress notes, Consult notes or additional Procedure notes:   Do not feel patient course further testing or imaging at this time. Pain is improved here. Similar pain to previous episodes. I have discussed with patient and/or family/sig other the results, interpretation of any imaging if performed, suspected diagnosis and treatment plan to include instructions regarding the diagnoses listed to which understanding was expressed with all questions answered      Reevaluation of patient:   stable    Disposition:  Diagnosis:   1. Recurrent right lower quadrant abdominal pain    2.  Non-intractable vomiting with nausea, unspecified vomiting type        Disposition: home    Follow-up Information     Follow up With Specialties Details Why Contact Info    Ruddy Wiggins MD Family Medicine, Internal Medicine Schedule an appointment as soon as possible for a visit  [address] [facility] EMERGENCY DEPT Emergency Medicine  If symptoms worsen [address] [facility] [address]  [phone]            Patient's Medications   Start Taking    No medications on file   Continue Taking    ACETAMINOPHEN (TYLENOL) 325 MG TABLET    Take 2 Tabs by mouth every four (4) hours as needed for Pain. ALBUTEROL (PROVENTIL VENTOLIN) 2.5 MG /3 ML (0.083 %) NEBU    3 mL by Nebulization route every six (6) hours as needed for Wheezing. ALBUTEROL SULFATE (PROAIR RESPICLICK) 90 MCG/ACTUATION AEPB    Take 2 Puffs by inhalation every four (4) hours as needed for Wheezing, Shortness of Breath or Cough. ALLERGY RELIEF, CETIRIZINE, 10 MG TABLET    take 1 tablet by mouth once daily    AMLODIPINE (NORVASC) 5 MG TABLET    Take 1 Tab by mouth daily. BISOPROLOL-HYDROCHLOROTHIAZIDE (ZIAC) 2.5-6.25 MG PER TABLET    take 1 tablet by mouth once daily    BUDESONIDE-FORMOTEROL (SYMBICORT) 160-4.5 MCG/ACTUATION HFAA    Take 2 Puffs by inhalation two (2) times a day. DAPAGLIFLOZIN (FARXIGA) 10 MG TAB TABLET    Take 10 mg by mouth Every morning. DICYCLOMINE (BENTYL) 20 MG TABLET    take 1 tablet by mouth four times a day    DOXEPIN (SINEQUAN) 25 MG CAPSULE    Take 1 Cap by mouth nightly. FOLIC ACID (FOLVITE) 1 MG TABLET    Take  by mouth daily. GLUCOSE BLOOD VI TEST STRIPS (ONETOUCH ULTRA TEST) STRIP    Check blood glucose as directed    IPRATROPIUM (ATROVENT) 0.02 % SOLN    2.5 mL by Nebulization route every six (6) hours as needed for Cough. METFORMIN ER (GLUCOPHAGE XR) 500 MG TABLET    take 2 tablets by mouth twice a day    MULTIVITAMIN (ONE A DAY) TABLET    Take 1 Tab by mouth daily. OMEPRAZOLE (PRILOSEC) 10 MG CAPSULE    take 1 capsule by mouth once daily    ONDANSETRON (ZOFRAN ODT) 4 MG DISINTEGRATING TABLET    Take 1 Tab by mouth every eight (8) hours as needed for Nausea or Vomiting.     POLYETHYLENE GLYCOL (MIRALAX) 17 GRAM/DOSE POWDER    Take 17 g by mouth daily.  1 tablespoon with 8 oz of water daily   These Medications have changed    No medications on file   Stop Taking    DEXAMETHASONE (DECADRON) 2 MG TABLET    Take 10 mg on the day after tomorrow

## 2020-08-26 NOTE — DISCHARGE INSTRUCTIONS

## 2020-08-26 NOTE — TELEPHONE ENCOUNTER
Date/Time:  8/26/2020 10:56 AM  Attempted to reach patient by telephone. Left HIPPA compliant message requesting a return call. Will attempt to reach patient again.

## 2020-08-27 NOTE — TELEPHONE ENCOUNTER
Date/Time:  8/27/2020 12:36 PM  Attempted to reach patient by telephone. Left HIPPA compliant message requesting a return call.

## 2020-08-28 NOTE — TELEPHONE ENCOUNTER
Patient resolved from Transition of Care episode on 8/28/2020  Discussed COVID-19 related testing which was not done at this time. Test results were not done. Patient informed of results, if available?  Not done    Called patient twice requesting a return call patient never returned my call

## 2020-09-11 ENCOUNTER — APPOINTMENT (OUTPATIENT)
Dept: GENERAL RADIOLOGY | Age: 60
DRG: 390 | End: 2020-09-11
Attending: EMERGENCY MEDICINE
Payer: COMMERCIAL

## 2020-09-11 ENCOUNTER — HOSPITAL ENCOUNTER (INPATIENT)
Age: 60
LOS: 2 days | Discharge: HOME | DRG: 390 | End: 2020-09-13
Attending: EMERGENCY MEDICINE | Admitting: HOSPITALIST
Payer: COMMERCIAL

## 2020-09-11 ENCOUNTER — APPOINTMENT (OUTPATIENT)
Dept: CT IMAGING | Age: 60
DRG: 390 | End: 2020-09-11
Attending: PHYSICIAN ASSISTANT
Payer: COMMERCIAL

## 2020-09-11 DIAGNOSIS — K56.600 PARTIAL SMALL BOWEL OBSTRUCTION (HCC): Primary | ICD-10-CM

## 2020-09-11 PROBLEM — K56.609 SMALL BOWEL OBSTRUCTION (HCC): Status: ACTIVE | Noted: 2020-09-11

## 2020-09-11 LAB
ALBUMIN SERPL-MCNC: 3.3 G/DL (ref 3.4–5)
ALBUMIN/GLOB SERPL: 0.7 {RATIO} (ref 0.8–1.7)
ALP SERPL-CCNC: 112 U/L (ref 45–117)
ALT SERPL-CCNC: 22 U/L (ref 13–56)
ANION GAP SERPL CALC-SCNC: 5 MMOL/L (ref 3–18)
APPEARANCE UR: CLEAR
AST SERPL-CCNC: 19 U/L (ref 10–38)
BACTERIA URNS QL MICRO: ABNORMAL /HPF
BASOPHILS # BLD: 0 K/UL (ref 0–0.1)
BASOPHILS NFR BLD: 0 % (ref 0–2)
BILIRUB SERPL-MCNC: 0.2 MG/DL (ref 0.2–1)
BILIRUB UR QL: NEGATIVE
BUN SERPL-MCNC: 17 MG/DL (ref 7–18)
BUN/CREAT SERPL: 16 (ref 12–20)
CALCIUM SERPL-MCNC: 9.2 MG/DL (ref 8.5–10.1)
CHLORIDE SERPL-SCNC: 102 MMOL/L (ref 100–111)
CO2 SERPL-SCNC: 31 MMOL/L (ref 21–32)
COLOR UR: YELLOW
CREAT SERPL-MCNC: 1.08 MG/DL (ref 0.6–1.3)
DIFFERENTIAL METHOD BLD: ABNORMAL
EOSINOPHIL # BLD: 0.3 K/UL (ref 0–0.4)
EOSINOPHIL NFR BLD: 4 % (ref 0–5)
EPITH CASTS URNS QL MICRO: ABNORMAL /LPF (ref 0–5)
ERYTHROCYTE [DISTWIDTH] IN BLOOD BY AUTOMATED COUNT: 14.9 % (ref 11.6–14.5)
GLOBULIN SER CALC-MCNC: 4.7 G/DL (ref 2–4)
GLUCOSE SERPL-MCNC: 142 MG/DL (ref 74–99)
GLUCOSE UR STRIP.AUTO-MCNC: >1000 MG/DL
HCT VFR BLD AUTO: 41.4 % (ref 35–45)
HGB BLD-MCNC: 13.1 G/DL (ref 12–16)
HGB UR QL STRIP: NEGATIVE
KETONES UR QL STRIP.AUTO: NEGATIVE MG/DL
LEUKOCYTE ESTERASE UR QL STRIP.AUTO: ABNORMAL
LIPASE SERPL-CCNC: 216 U/L (ref 73–393)
LYMPHOCYTES # BLD: 2 K/UL (ref 0.9–3.6)
LYMPHOCYTES NFR BLD: 28 % (ref 21–52)
MCH RBC QN AUTO: 26.7 PG (ref 24–34)
MCHC RBC AUTO-ENTMCNC: 31.6 G/DL (ref 31–37)
MCV RBC AUTO: 84.5 FL (ref 74–97)
MONOCYTES # BLD: 0.5 K/UL (ref 0.05–1.2)
MONOCYTES NFR BLD: 8 % (ref 3–10)
NEUTS SEG # BLD: 4.3 K/UL (ref 1.8–8)
NEUTS SEG NFR BLD: 60 % (ref 40–73)
NITRITE UR QL STRIP.AUTO: NEGATIVE
PH UR STRIP: 5 [PH] (ref 5–8)
PLATELET # BLD AUTO: 349 K/UL (ref 135–420)
PMV BLD AUTO: 10.4 FL (ref 9.2–11.8)
POTASSIUM SERPL-SCNC: 3.9 MMOL/L (ref 3.5–5.5)
PROT SERPL-MCNC: 8 G/DL (ref 6.4–8.2)
PROT UR STRIP-MCNC: NEGATIVE MG/DL
RBC # BLD AUTO: 4.9 M/UL (ref 4.2–5.3)
RBC #/AREA URNS HPF: ABNORMAL /HPF (ref 0–5)
SODIUM SERPL-SCNC: 138 MMOL/L (ref 136–145)
SP GR UR REFRACTOMETRY: 1.03 (ref 1–1.03)
UROBILINOGEN UR QL STRIP.AUTO: 0.2 EU/DL (ref 0.2–1)
WBC # BLD AUTO: 7.1 K/UL (ref 4.6–13.2)
WBC URNS QL MICRO: ABNORMAL /HPF (ref 0–4)

## 2020-09-11 PROCEDURE — 96376 TX/PRO/DX INJ SAME DRUG ADON: CPT

## 2020-09-11 PROCEDURE — 74011000636 HC RX REV CODE- 636: Performed by: EMERGENCY MEDICINE

## 2020-09-11 PROCEDURE — 99285 EMERGENCY DEPT VISIT HI MDM: CPT

## 2020-09-11 PROCEDURE — 0D9670Z DRAINAGE OF STOMACH WITH DRAINAGE DEVICE, VIA NATURAL OR ARTIFICIAL OPENING: ICD-10-PCS | Performed by: HOSPITALIST

## 2020-09-11 PROCEDURE — 74011000250 HC RX REV CODE- 250: Performed by: EMERGENCY MEDICINE

## 2020-09-11 PROCEDURE — 83690 ASSAY OF LIPASE: CPT

## 2020-09-11 PROCEDURE — 74011250636 HC RX REV CODE- 250/636: Performed by: PHYSICIAN ASSISTANT

## 2020-09-11 PROCEDURE — 96375 TX/PRO/DX INJ NEW DRUG ADDON: CPT

## 2020-09-11 PROCEDURE — 65270000029 HC RM PRIVATE

## 2020-09-11 PROCEDURE — 74177 CT ABD & PELVIS W/CONTRAST: CPT

## 2020-09-11 PROCEDURE — 96374 THER/PROPH/DIAG INJ IV PUSH: CPT

## 2020-09-11 PROCEDURE — 74018 RADEX ABDOMEN 1 VIEW: CPT

## 2020-09-11 PROCEDURE — 85025 COMPLETE CBC W/AUTO DIFF WBC: CPT

## 2020-09-11 PROCEDURE — 80053 COMPREHEN METABOLIC PANEL: CPT

## 2020-09-11 PROCEDURE — 81001 URINALYSIS AUTO W/SCOPE: CPT

## 2020-09-11 RX ORDER — ONDANSETRON 2 MG/ML
4 INJECTION INTRAMUSCULAR; INTRAVENOUS
Status: DISCONTINUED | OUTPATIENT
Start: 2020-09-11 | End: 2020-09-13 | Stop reason: HOSPADM

## 2020-09-11 RX ORDER — ONDANSETRON 2 MG/ML
4 INJECTION INTRAMUSCULAR; INTRAVENOUS
Status: COMPLETED | OUTPATIENT
Start: 2020-09-11 | End: 2020-09-11

## 2020-09-11 RX ORDER — SODIUM CHLORIDE 0.9 % (FLUSH) 0.9 %
5-40 SYRINGE (ML) INJECTION EVERY 8 HOURS
Status: DISCONTINUED | OUTPATIENT
Start: 2020-09-12 | End: 2020-09-13 | Stop reason: HOSPADM

## 2020-09-11 RX ORDER — POLYETHYLENE GLYCOL 3350 17 G/17G
17 POWDER, FOR SOLUTION ORAL DAILY PRN
Status: DISCONTINUED | OUTPATIENT
Start: 2020-09-11 | End: 2020-09-13 | Stop reason: HOSPADM

## 2020-09-11 RX ORDER — DEXTROSE, SODIUM CHLORIDE, AND POTASSIUM CHLORIDE 5; .45; .15 G/100ML; G/100ML; G/100ML
125 INJECTION INTRAVENOUS CONTINUOUS
Status: DISCONTINUED | OUTPATIENT
Start: 2020-09-12 | End: 2020-09-13

## 2020-09-11 RX ORDER — MORPHINE SULFATE 2 MG/ML
2 INJECTION, SOLUTION INTRAMUSCULAR; INTRAVENOUS
Status: DISCONTINUED | OUTPATIENT
Start: 2020-09-11 | End: 2020-09-12

## 2020-09-11 RX ORDER — SODIUM CHLORIDE 9 MG/ML
125 INJECTION, SOLUTION INTRAVENOUS ONCE
Status: COMPLETED | OUTPATIENT
Start: 2020-09-11 | End: 2020-09-11

## 2020-09-11 RX ORDER — MORPHINE SULFATE 4 MG/ML
4 INJECTION, SOLUTION INTRAMUSCULAR; INTRAVENOUS
Status: COMPLETED | OUTPATIENT
Start: 2020-09-11 | End: 2020-09-11

## 2020-09-11 RX ORDER — SODIUM CHLORIDE 0.9 % (FLUSH) 0.9 %
5-40 SYRINGE (ML) INJECTION AS NEEDED
Status: DISCONTINUED | OUTPATIENT
Start: 2020-09-11 | End: 2020-09-13 | Stop reason: HOSPADM

## 2020-09-11 RX ORDER — PROMETHAZINE HYDROCHLORIDE 25 MG/1
12.5 TABLET ORAL
Status: DISCONTINUED | OUTPATIENT
Start: 2020-09-11 | End: 2020-09-13 | Stop reason: HOSPADM

## 2020-09-11 RX ORDER — ACETAMINOPHEN 650 MG/1
650 SUPPOSITORY RECTAL
Status: DISCONTINUED | OUTPATIENT
Start: 2020-09-11 | End: 2020-09-13 | Stop reason: HOSPADM

## 2020-09-11 RX ORDER — NALOXONE HYDROCHLORIDE 0.4 MG/ML
0.4 INJECTION, SOLUTION INTRAMUSCULAR; INTRAVENOUS; SUBCUTANEOUS AS NEEDED
Status: DISCONTINUED | OUTPATIENT
Start: 2020-09-11 | End: 2020-09-13 | Stop reason: HOSPADM

## 2020-09-11 RX ORDER — ENOXAPARIN SODIUM 100 MG/ML
40 INJECTION SUBCUTANEOUS DAILY
Status: DISCONTINUED | OUTPATIENT
Start: 2020-09-12 | End: 2020-09-13 | Stop reason: HOSPADM

## 2020-09-11 RX ORDER — ACETAMINOPHEN 325 MG/1
650 TABLET ORAL
Status: DISCONTINUED | OUTPATIENT
Start: 2020-09-11 | End: 2020-09-13 | Stop reason: HOSPADM

## 2020-09-11 RX ADMIN — MORPHINE SULFATE 4 MG: 4 INJECTION, SOLUTION INTRAMUSCULAR; INTRAVENOUS at 18:36

## 2020-09-11 RX ADMIN — SODIUM CHLORIDE 10 ML: 9 INJECTION, SOLUTION INTRAMUSCULAR; INTRAVENOUS; SUBCUTANEOUS at 23:56

## 2020-09-11 RX ADMIN — TOPICAL ANESTHETIC 1 SPRAY: 200 SPRAY DENTAL; PERIODONTAL at 22:27

## 2020-09-11 RX ADMIN — ONDANSETRON 4 MG: 2 INJECTION INTRAMUSCULAR; INTRAVENOUS at 18:36

## 2020-09-11 RX ADMIN — MORPHINE SULFATE 4 MG: 4 INJECTION, SOLUTION INTRAMUSCULAR; INTRAVENOUS at 21:17

## 2020-09-11 RX ADMIN — SODIUM CHLORIDE 125 ML/HR: 900 INJECTION, SOLUTION INTRAVENOUS at 22:53

## 2020-09-11 RX ADMIN — SODIUM CHLORIDE 1000 ML: 900 INJECTION, SOLUTION INTRAVENOUS at 18:36

## 2020-09-11 RX ADMIN — IOPAMIDOL 80 ML: 612 INJECTION, SOLUTION INTRAVENOUS at 20:53

## 2020-09-11 NOTE — ED NOTES
Attempted to get iv access x2 left ac right hand   This rn unable to obtain iv access  Sharon Burr rn in to assess pt for iv acess

## 2020-09-11 NOTE — ED NOTES
rlq abd pain, points to groin when asked where it hurts states radiates to her back  Reports vomiting today denies diarrhea  Denies fever  Pt visibly uncomfortable

## 2020-09-12 LAB
ANION GAP SERPL CALC-SCNC: 5 MMOL/L (ref 3–18)
BASOPHILS # BLD: 0 K/UL (ref 0–0.1)
BASOPHILS NFR BLD: 0 % (ref 0–2)
BUN SERPL-MCNC: 17 MG/DL (ref 7–18)
BUN/CREAT SERPL: 17 (ref 12–20)
CALCIUM SERPL-MCNC: 8.7 MG/DL (ref 8.5–10.1)
CHLORIDE SERPL-SCNC: 107 MMOL/L (ref 100–111)
CO2 SERPL-SCNC: 26 MMOL/L (ref 21–32)
CREAT SERPL-MCNC: 0.98 MG/DL (ref 0.6–1.3)
DIFFERENTIAL METHOD BLD: ABNORMAL
EOSINOPHIL # BLD: 0.3 K/UL (ref 0–0.4)
EOSINOPHIL NFR BLD: 4 % (ref 0–5)
ERYTHROCYTE [DISTWIDTH] IN BLOOD BY AUTOMATED COUNT: 15.1 % (ref 11.6–14.5)
GLUCOSE SERPL-MCNC: 115 MG/DL (ref 74–99)
HCT VFR BLD AUTO: 41.8 % (ref 35–45)
HGB BLD-MCNC: 13 G/DL (ref 12–16)
LYMPHOCYTES # BLD: 2.3 K/UL (ref 0.9–3.6)
LYMPHOCYTES NFR BLD: 30 % (ref 21–52)
MCH RBC QN AUTO: 26.6 PG (ref 24–34)
MCHC RBC AUTO-ENTMCNC: 31.1 G/DL (ref 31–37)
MCV RBC AUTO: 85.5 FL (ref 74–97)
MONOCYTES # BLD: 0.5 K/UL (ref 0.05–1.2)
MONOCYTES NFR BLD: 7 % (ref 3–10)
NEUTS SEG # BLD: 4.5 K/UL (ref 1.8–8)
NEUTS SEG NFR BLD: 59 % (ref 40–73)
PLATELET # BLD AUTO: 347 K/UL (ref 135–420)
PMV BLD AUTO: 10.7 FL (ref 9.2–11.8)
POTASSIUM SERPL-SCNC: 4.4 MMOL/L (ref 3.5–5.5)
RBC # BLD AUTO: 4.89 M/UL (ref 4.2–5.3)
SODIUM SERPL-SCNC: 138 MMOL/L (ref 136–145)
WBC # BLD AUTO: 7.6 K/UL (ref 4.6–13.2)

## 2020-09-12 PROCEDURE — 74011250636 HC RX REV CODE- 250/636: Performed by: HOSPITALIST

## 2020-09-12 PROCEDURE — 80048 BASIC METABOLIC PNL TOTAL CA: CPT

## 2020-09-12 PROCEDURE — 36415 COLL VENOUS BLD VENIPUNCTURE: CPT

## 2020-09-12 PROCEDURE — 74011250637 HC RX REV CODE- 250/637: Performed by: HOSPITALIST

## 2020-09-12 PROCEDURE — 2709999900 HC NON-CHARGEABLE SUPPLY

## 2020-09-12 PROCEDURE — 65270000029 HC RM PRIVATE

## 2020-09-12 PROCEDURE — 85025 COMPLETE CBC W/AUTO DIFF WBC: CPT

## 2020-09-12 PROCEDURE — 74011000250 HC RX REV CODE- 250: Performed by: HOSPITALIST

## 2020-09-12 RX ORDER — OXYCODONE AND ACETAMINOPHEN 5; 325 MG/1; MG/1
1 TABLET ORAL
Status: DISCONTINUED | OUTPATIENT
Start: 2020-09-12 | End: 2020-09-13 | Stop reason: HOSPADM

## 2020-09-12 RX ORDER — DIPHENHYDRAMINE HCL 25 MG
25 CAPSULE ORAL
Status: DISCONTINUED | OUTPATIENT
Start: 2020-09-12 | End: 2020-09-13 | Stop reason: HOSPADM

## 2020-09-12 RX ADMIN — DEXTROSE MONOHYDRATE, SODIUM CHLORIDE, AND POTASSIUM CHLORIDE 125 ML/HR: 50; 4.5; 1.49 INJECTION, SOLUTION INTRAVENOUS at 02:32

## 2020-09-12 RX ADMIN — SODIUM CHLORIDE 10 ML: 9 INJECTION, SOLUTION INTRAMUSCULAR; INTRAVENOUS; SUBCUTANEOUS at 06:57

## 2020-09-12 RX ADMIN — DEXTROSE MONOHYDRATE, SODIUM CHLORIDE, AND POTASSIUM CHLORIDE 125 ML/HR: 50; 4.5; 1.49 INJECTION, SOLUTION INTRAVENOUS at 12:42

## 2020-09-12 RX ADMIN — SODIUM CHLORIDE 10 ML: 9 INJECTION, SOLUTION INTRAMUSCULAR; INTRAVENOUS; SUBCUTANEOUS at 22:28

## 2020-09-12 RX ADMIN — DIPHENHYDRAMINE HYDROCHLORIDE 25 MG: 25 CAPSULE ORAL at 22:25

## 2020-09-12 RX ADMIN — OXYCODONE HYDROCHLORIDE AND ACETAMINOPHEN 1 TABLET: 5; 325 TABLET ORAL at 22:26

## 2020-09-12 RX ADMIN — DEXTROSE MONOHYDRATE, SODIUM CHLORIDE, AND POTASSIUM CHLORIDE 125 ML/HR: 50; 4.5; 1.49 INJECTION, SOLUTION INTRAVENOUS at 20:42

## 2020-09-12 RX ADMIN — MORPHINE SULFATE 2 MG: 2 INJECTION, SOLUTION INTRAMUSCULAR; INTRAVENOUS at 01:25

## 2020-09-12 RX ADMIN — OXYCODONE HYDROCHLORIDE AND ACETAMINOPHEN 1 TABLET: 5; 325 TABLET ORAL at 17:13

## 2020-09-12 RX ADMIN — SODIUM CHLORIDE 10 ML: 9 INJECTION, SOLUTION INTRAMUSCULAR; INTRAVENOUS; SUBCUTANEOUS at 16:19

## 2020-09-12 RX ADMIN — MORPHINE SULFATE 2 MG: 2 INJECTION, SOLUTION INTRAMUSCULAR; INTRAVENOUS at 06:12

## 2020-09-12 RX ADMIN — ONDANSETRON 4 MG: 2 INJECTION INTRAMUSCULAR; INTRAVENOUS at 01:25

## 2020-09-12 RX ADMIN — DIPHENHYDRAMINE HYDROCHLORIDE 25 MG: 25 CAPSULE ORAL at 16:17

## 2020-09-12 RX ADMIN — ENOXAPARIN SODIUM 40 MG: 40 INJECTION SUBCUTANEOUS at 08:56

## 2020-09-12 RX ADMIN — MORPHINE SULFATE 2 MG: 2 INJECTION, SOLUTION INTRAMUSCULAR; INTRAVENOUS at 10:30

## 2020-09-12 NOTE — ED NOTES
Received report from Gee Molina, [address] [facility] and Linda Romero, [facility]

## 2020-09-12 NOTE — ROUTINE PROCESS
Bedside shift change report given to Justin Durán RN (oncoming nurse) by Andie Marie RN (offgoing nurse). Report included the following information SBAR, Procedure Summary, Intake/Output, MAR and Recent Results.

## 2020-09-12 NOTE — CONSULTS
General Surgery Consult      Nick Ceja [facility]  Admit date: 2020    MRN: [id number]     : 1960     Age: 61 y.o. Attending Physician: Reagan Curiel MD, FACS      Subjective:     Niko Guallpa is a 61 y.o. female who is very well-known to me who presented with a sudden onset of abdominal pain and a picture of early partial small bowel obstruction. The patient stated that she was doing virtual school online and all of a sudden she felt a sudden onset of pain mainly in the right lower quadrant. She said she went to the bathroom and had a bowel movement but the pain continued so she came into the emergency room. Her vital signs were normal and she had no leukocytosis but her CT scan showed slight small bowel dilatation with partial early small bowel obstruction. She was admitted overnight and an NG tube was inserted and the patient has stated that her pain is almost completely gone and she feels great. She said that she is passing flatus and she feels hungry.   Her NG tube output is minimal.    Patient Active Problem List    Diagnosis Date Noted    Small bowel obstruction ([address] 2020    Anemia 08/10/2019    S/P small bowel resection 2019    Adnexal cyst 2019    Chronic pelvic pain in female 2019    Bowel obstruction (HCC) 2018    Non-intractable cyclical vomiting with nausea 2018    Type 2 diabetes mellitus with nephropathy ([address] 2018    Chronic abdominal pain 2017    Post-operative pain 2017    SBO (small bowel obstruction) ([address] 2017    Osteoarthritis of left knee 2016    Hypertension 2016    Hyperlipidemia 2016    GERD (gastroesophageal reflux disease) 2016    Asthma 2016    Type 2 diabetes mellitus ([address] 2016    Sural neuritis 2014    Chest pain 2014    Essential hypertension, benign 2012    Generalized abdominal pain      Past Medical History: Diagnosis Date    Abdominal adhesions     Adnexal cyst 7/30/2019    Left    Anemia     Asthma     Chronic pelvic pain in female 7/30/2019    Diabetes mellitus     Dyskinesia     bilateral    Essential hypertension     GERD (gastroesophageal reflux disease)     Hypertension     Menopause     Microhematuria     Rheumatoid arteritis ([facility] [facility] 2018    Stool color black       Past Surgical History:   Procedure Laterality Date    COLONOSCOPY N/A 1/21/2019    COLONOS[facility] performed by [facility] at [facility] ENDOSCOPY    HX CHOLECYSTECTOMY  6/28/10    HX COLONOSCOPY      HX ENDOSCOPY      HX HERNIA REPAIR      HX HYSTERECTOMY  1989    Fibroids    HX KNEE REPLACEMENT Left     HX KNEE REPLACEMENT Right 06/2018    HX OOPHORECTOMY  12/2000    HX ORTHOPAEDIC Right     ankle- multiple surgeries    HX SMALL BOWEL RESECTION  12/2000    HX SMALL BOWEL RESECTION  02/21/2017    Dr. Ponce Erps        Social History     Tobacco Use    Smoking status: Never Smoker    Smokeless tobacco: Never Used   Substance Use Topics    Alcohol use: No      Social History     Tobacco Use   Smoking Status Never Smoker   Smokeless Tobacco Never Used     Family History   Problem Relation Age of Onset    Hypertension Other         parent    Breast Cancer Other 21    Heart Disease Father     Diabetes Father     Lung Disease Father     Diabetes Mother     Hypertension Other         sibling    Diabetes Other         parent    Kidney Disease Brother     Diabetes Brother     Lung Disease Brother       Current Facility-Administered Medications   Medication Dose Route Frequency    sodium chloride (NS) flush 5-40 mL  5-40 mL IntraVENous Q8H    sodium chloride (NS) flush 5-40 mL  5-40 mL IntraVENous PRN    acetaminophen (TYLENOL) tablet 650 mg  650 mg Oral Q6H PRN    Or    acetaminophen (TYLENOL) suppository 650 mg  650 mg Rectal Q6H PRN    polyethylene glycol (MIRALAX) packet 17 g  17 g Oral DAILY PRN    promethazine (PHENERGAN) tablet 12.5 mg  12.5 mg Oral Q6H PRN    Or    ondansetron (ZOFRAN) injection 4 mg  4 mg IntraVENous Q6H PRN    enoxaparin (LOVENOX) injection 40 mg  40 mg SubCUTAneous DAILY    dextrose 5% - 0.45% NaCl with KCl 20 mEq/L infusion  125 mL/hr IntraVENous CONTINUOUS    morphine injection 2 mg  2 mg IntraVENous Q4H PRN    naloxone (NARCAN) injection 0.4 mg  0.4 mg IntraVENous PRN      Allergies   Allergen Reactions    Macrodantin [Nitrofurantoin Macrocrystalline] Itching and Other (comments)    Tape [Adhesive] Other (comments)     Paper tape-- feels like burning skin  Burned skin when had wound vac        Review of Systems:  Constitutional: negative  Eyes: negative  Ears, Nose, Mouth, Throat, and Face: negative  Respiratory: negative  Cardiovascular: negative  Gastrointestinal: positive for nausea, vomiting and abdominal pain  Genitourinary:negative  Integument/Breast: negative  Hematologic/Lymphatic: negative  Musculoskeletal:negative  Neurological: negative  Behavioral/Psychiatric: negative  Endocrine: negative  Allergic/Immunologic: negative      Objective:     Visit Vitals  /78 (BP 1 Location: Right arm, BP Patient Position: At rest)   Pulse 84   Temp 98.4 °F (36.9 °C)   Resp 18   Ht 5' 4\" (1.626 m)   Wt 67.2 kg (148 lb 3.2 oz)   SpO2 100%   BMI 25.44 kg/m²       Physical Exam:      General:  in no apparent distress, well developed and well nourished, alert, oriented times 3, afebrile and normal vitals   Eyes:  conjunctivae and sclerae normal, pupils equal, round, reactive to light   Throat & Neck: no erythema or exudates noted and neck supple and symmetrical; no palpable masses   Lungs:   clear to auscultation bilaterally   Heart:  Regular rate and rhythm   Abdomen:   rounded, soft, nontender, nondistended, no masses or organomegaly. No evidence of abdominal wall hernias.    Extremities: extremities normal, atraumatic, no cyanosis or edema   Skin: Normal. Imaging and Lab Review:     CBC:   Lab Results   Component Value Date/Time    WBC 7.6 09/12/2020 04:20 AM    RBC 4.89 09/12/2020 04:20 AM    HGB 13.0 09/12/2020 04:20 AM    HCT 41.8 09/12/2020 04:20 AM    PLATELET 476 87/27/1863 04:20 AM     BMP:   Lab Results   Component Value Date/Time    Glucose 115 (H) 09/12/2020 04:20 AM    Sodium 138 09/12/2020 04:20 AM    Potassium 4.4 09/12/2020 04:20 AM    Chloride 107 09/12/2020 04:20 AM    CO2 26 09/12/2020 04:20 AM    BUN 17 09/12/2020 04:20 AM    Creatinine 0.98 09/12/2020 04:20 AM    Calcium 8.7 09/12/2020 04:20 AM     CMP:  Lab Results   Component Value Date/Time    Glucose 115 (H) 09/12/2020 04:20 AM    Sodium 138 09/12/2020 04:20 AM    Potassium 4.4 09/12/2020 04:20 AM    Chloride 107 09/12/2020 04:20 AM    CO2 26 09/12/2020 04:20 AM    BUN 17 09/12/2020 04:20 AM    Creatinine 0.98 09/12/2020 04:20 AM    Calcium 8.7 09/12/2020 04:20 AM    Anion gap 5 09/12/2020 04:20 AM    BUN/Creatinine ratio 17 09/12/2020 04:20 AM    Alk.  phosphatase 112 09/11/2020 06:34 PM    Protein, total 8.0 09/11/2020 06:34 PM    Albumin 3.3 (L) 09/11/2020 06:34 PM    Globulin 4.7 (H) 09/11/2020 06:34 PM    A-G Ratio 0.7 (L) 09/11/2020 06:34 PM       Recent Results (from the past 24 hour(s))   URINALYSIS W/ RFLX MICROSCOPIC    Collection Time: 09/11/20  5:28 PM   Result Value Ref Range    Color YELLOW      Appearance CLEAR      Specific gravity 1.026 1.005 - 1.030      pH (UA) 5.0 5.0 - 8.0      Protein Negative NEG mg/dL    Glucose >1,000 (A) NEG mg/dL    Ketone Negative NEG mg/dL    Bilirubin Negative NEG      Blood Negative NEG      Urobilinogen 0.2 0.2 - 1.0 EU/dL    Nitrites Negative NEG      Leukocyte Esterase SMALL (A) NEG     URINE MICROSCOPIC ONLY    Collection Time: 09/11/20  5:28 PM   Result Value Ref Range    WBC 11 to 20 0 - 4 /hpf    RBC 0 to 3 0 - 5 /hpf    Epithelial cells 2+ 0 - 5 /lpf    Bacteria FEW (A) NEG /hpf   CBC WITH AUTOMATED DIFF    Collection Time: 09/11/20 6:34 PM   Result Value Ref Range    WBC 7.1 4.6 - 13.2 K/uL    RBC 4.90 4.20 - 5.30 M/uL    HGB 13.1 12.0 - 16.0 g/dL    HCT 41.4 35.0 - 45.0 %    MCV 84.5 74.0 - 97.0 FL    MCH 26.7 24.0 - 34.0 PG    MCHC 31.6 31.0 - 37.0 g/dL    RDW 14.9 (H) 11.6 - 14.5 %    PLATELET 561 673 - 469 K/uL    MPV 10.4 9.2 - 11.8 FL    NEUTROPHILS 60 40 - 73 %    LYMPHOCYTES 28 21 - 52 %    MONOCYTES 8 3 - 10 %    EOSINOPHILS 4 0 - 5 %    BASOPHILS 0 0 - 2 %    ABS. NEUTROPHILS 4.3 1.8 - 8.0 K/UL    ABS. LYMPHOCYTES 2.0 0.9 - 3.6 K/UL    ABS. MONOCYTES 0.5 0.05 - 1.2 K/UL    ABS. EOSINOPHILS 0.3 0.0 - 0.4 K/UL    ABS. BASOPHILS 0.0 0.0 - 0.1 K/UL    DF AUTOMATED     METABOLIC PANEL, COMPREHENSIVE    Collection Time: 09/11/20  6:34 PM   Result Value Ref Range    Sodium 138 136 - 145 mmol/L    Potassium 3.9 3.5 - 5.5 mmol/L    Chloride 102 100 - 111 mmol/L    CO2 31 21 - 32 mmol/L    Anion gap 5 3.0 - 18 mmol/L    Glucose 142 (H) 74 - 99 mg/dL    BUN 17 7.0 - 18 MG/DL    Creatinine 1.08 0.6 - 1.3 MG/DL    BUN/Creatinine ratio 16 12 - 20      GFR est AA >60 >60 ml/min/1.73m2    GFR est non-AA 52 (L) >60 ml/min/1.73m2    Calcium 9.2 8.5 - 10.1 MG/DL    Bilirubin, total 0.2 0.2 - 1.0 MG/DL    ALT (SGPT) 22 13 - 56 U/L    AST (SGOT) 19 10 - 38 U/L    Alk.  phosphatase 112 45 - 117 U/L    Protein, total 8.0 6.4 - 8.2 g/dL    Albumin 3.3 (L) 3.4 - 5.0 g/dL    Globulin 4.7 (H) 2.0 - 4.0 g/dL    A-G Ratio 0.7 (L) 0.8 - 1.7     LIPASE    Collection Time: 09/11/20  6:34 PM   Result Value Ref Range    Lipase 216 73 - 393 U/L   CBC WITH AUTOMATED DIFF    Collection Time: 09/12/20  4:20 AM   Result Value Ref Range    WBC 7.6 4.6 - 13.2 K/uL    RBC 4.89 4.20 - 5.30 M/uL    HGB 13.0 12.0 - 16.0 g/dL    HCT 41.8 35.0 - 45.0 %    MCV 85.5 74.0 - 97.0 FL    MCH 26.6 24.0 - 34.0 PG    MCHC 31.1 31.0 - 37.0 g/dL    RDW 15.1 (H) 11.6 - 14.5 %    PLATELET 669 930 - 079 K/uL    MPV 10.7 9.2 - 11.8 FL    NEUTROPHILS 59 40 - 73 %    LYMPHOCYTES 30 21 - 52 % MONOCYTES 7 3 - 10 %    EOSINOPHILS 4 0 - 5 %    BASOPHILS 0 0 - 2 %    ABS. NEUTROPHILS 4.5 1.8 - 8.0 K/UL    ABS. LYMPHOCYTES 2.3 0.9 - 3.6 K/UL    ABS. MONOCYTES 0.5 0.05 - 1.2 K/UL    ABS. EOSINOPHILS 0.3 0.0 - 0.4 K/UL    ABS. BASOPHILS 0.0 0.0 - 0.1 K/UL    DF AUTOMATED     METABOLIC PANEL, BASIC    Collection Time: 09/12/20  4:20 AM   Result Value Ref Range    Sodium 138 136 - 145 mmol/L    Potassium 4.4 3.5 - 5.5 mmol/L    Chloride 107 100 - 111 mmol/L    CO2 26 21 - 32 mmol/L    Anion gap 5 3.0 - 18 mmol/L    Glucose 115 (H) 74 - 99 mg/dL    BUN 17 7.0 - 18 MG/DL    Creatinine 0.98 0.6 - 1.3 MG/DL    BUN/Creatinine ratio 17 12 - 20      GFR est AA >60 >60 ml/min/1.73m2    GFR est non-AA 58 (L) >60 ml/min/1.73m2    Calcium 8.7 8.5 - 10.1 MG/DL       images and reports reviewed    Assessment:   Saroj Roldan is a 61 y.o. female who is very well-known to me who had multiple laparotomies in the past and severe adhesions and a history of enterocutaneous fistula that was treated medically now is presenting with a picture of partial bowel obstruction that has resolved medically. I think it safe to DC the NG tube and allow clear liquid diet and observe the patient closely.      Plan:     I appreciate the medicine admission and management  I placed an order to DC the NG tube and I placed an order for clear liquid diet  Ambulation  Close observation  We will follow    Please call me if you have any questions (cell phone: [phone])     Signed By: Estela Hawk MD     September 12, 2020

## 2020-09-12 NOTE — H&P
History and Physical    Patient: Ed Anaya               Sex: female          DOA: 9/11/2020       YOB: 1960      Age:  61 y.o.        LOS:  LOS: 1 day        HPI:     Ed Anaya is a 61 y.o. female who has had multiple surgeries for SBO in the past.  She began having abdominal pain two nights ago. She had vomiting over the course of the day yesterday. This worsened as the day went on. She had a small bowel movement two days ago. No fever. She presented to the ER where she was found to have SBO. She will be admitted for ongoing management. Past Medical History:   Diagnosis Date    Abdominal adhesions     Adnexal cyst 7/30/2019    Left    Anemia     Asthma     Chronic pelvic pain in female 7/30/2019    Diabetes mellitus     Dyskinesia     bilateral    Essential hypertension     GERD (gastroesophageal reflux disease)     Hypertension     Menopause     Microhematuria     Rheumatoid arteritis ([facility] [address] 2018    Stool color black        Social History:   Tobacco use:  Patient does not use tobacco   Alcohol use:  Patient does not use alcohol   Patient lives with her  and mother. Family History: Mother has DM and HTN   Father had ESRD with DM and CVA    Review of Systems    Constitutional:  No fever or weight loss  HEENT:  No headache or visual changes  Cardiovascular:  No chest pain or diaphoresis  Respiratory:  No coughing, wheezing, or shortness of breath. GI:  Abdominal pain with vomiting as above. No diarrhea.   :  No hematuria or dysuria  Skin:  No rashes or moles  Neuro:  No seizures or syncope  Hematological:  No bruising or bleeding  Endocrine:  Patient has known diabetes, no thyroid disease    Physical Exam:      Visit Vitals  /82   Pulse 89   Temp 98.7 °F (37.1 °C)   Resp 18   Ht 5' 4\" (1.626 m)   Wt 65.8 kg (145 lb)   SpO2 98%   BMI 24.89 kg/m²       Physical Exam:    Gen:  No distress, alert  HEENT:  Normal cephalic atraumatic, extra-occular movements are intact. Neck:  Supple, No JVD  Lungs:  Clear bilaterally, no wheeze, no rales, normal effort  Heart:  Regular Rate and Rhythm, normal S1 and S2, no edema  Abdomen:  Soft, tender to palpation. Bowel sounds present  Extremities:  Well perfused, no cyanosis or edema  Neurological:  Awake and alert, CN's are intact, normal strength throughout extremities  Skin:  No rashes or moles  Mental Status:  Normal thought process, does not appear anxious  Laboratory Studies:    BMP:   Lab Results   Component Value Date/Time     09/11/2020 06:34 PM    K 3.9 09/11/2020 06:34 PM     09/11/2020 06:34 PM    CO2 31 09/11/2020 06:34 PM    AGAP 5 09/11/2020 06:34 PM     (H) 09/11/2020 06:34 PM    BUN 17 09/11/2020 06:34 PM    CREA 1.08 09/11/2020 06:34 PM    GFRAA >60 09/11/2020 06:34 PM    GFRNA 52 (L) 09/11/2020 06:34 PM     CBC:   Lab Results   Component Value Date/Time    WBC 7.1 09/11/2020 06:34 PM    HGB 13.1 09/11/2020 06:34 PM    HCT 41.4 09/11/2020 06:34 PM     09/11/2020 06:34 PM       Assessment/Plan     Principal Problem:    Small bowel obstruction ([facility] [address] (9/11/2020)    Active Problems:    Hypertension (6/27/2016)      Type 2 diabetes mellitus ([address] (6/27/2016)      Hyperlipidemia (6/27/2016)        PLAN:    NG tube in place  NPO  Surgery called from the ER.   BP control  BS control

## 2020-09-12 NOTE — PROGRESS NOTES
Discharge/Transition Planning    Problem: Discharge Planning  Goal: *Discharge to safe environment  Outcome: Resolved/Met    Reason for Admission:   Small bowel obstruction                   RUR Score:     21             PCP: First and Last name:  Dina Weaver   Name of Practice:    Are you a current patient: Yes/No: yes   Approximate date of last visit: June   Can you participate in a virtual visit if needed: yes    Do you (patient/family) have any concerns for transition/discharge? no              Plan for utilizing home health:   no    Current Advanced Directive/Advance Care Plan:  none            Transition of Care Plan:        Interviewed patient. Verified demographics listed on face sheet with patient; all information correct. [place] and [facility] for [place]. Patient lives in 1 story home with spouse . Patient independent with ADLs prior to admission. No use of DME prior to admission. Discharge plan is Home      Patient has designated _______spouse_________________ to participate in his/her discharge plan and to receive any needed information. [facility]   [phone]       Care Management Interventions  PCP Verified by CM:  Yes  Mode of Transport at Discharge: Self  Transition of Care Consult (CM Consult): Discharge Planning  Current Support Network: Lives with Spouse, Own Home  Confirm Follow Up Transport: Self  The Plan for Transition of Care is Related to the Following Treatment Goals : resolution of acute needs  Discharge Location  Discharge Placement: Home

## 2020-09-12 NOTE — ED NOTES
TRANSFER - ED to INPATIENT REPORT:    Verbal report given to Virginia Arias RN(name) on Tania Shoulders  being transferred to 2400(unit) for routine progression of care       Report consisted of patients Situation, Background, Assessment and   Recommendations(SBAR). Admitting diagnosis Small bowel obstruction ([facility] [address] [X50.067]    Information from the following report(s) SBAR was made available to receiving floor. Lines:   Peripheral IV 09/11/20 Left Hand (Active)        HCG Status for ALL Females under 53 y/o: NO     Medication list updated today    Opportunity for questions and clarification was provided.       Patient is oriented to time, place, person and situation N/A  Patient is  continent and ambulatory without assist     Valuables transported with patient     Patient transported with:   Tech      =Monitored (most recent)  Vitals w/ MEWS Score (last day)     Date/Time MEWS Score Pulse Resp Temp BP Level of Consciousness SpO2    09/11/20 2320  1  81  16  98.1 °F (36.7 °C)  120/68  Alert  95 %    09/11/20 2255    85  16    126/78    95 %    09/11/20 2215              99 %    09/11/20 1837    88  16    121/79    100 %    09/11/20 1723    91  16    128/82  Alert  100 %

## 2020-09-12 NOTE — PROGRESS NOTES
Received pt from ED via stretcher, she is alert and oriented, c/o abdominal pain 8/10, RN will review chart and treat pt.

## 2020-09-12 NOTE — PROGRESS NOTES
Patient received in bed awake. Patient A&O x4, denies pain. No distress noted. Frequently use items within reach. Bed locked in low position. Call bell within reach and Patient verbalized understanding of use for assistance and needs. 1030- Patient given Morphine 2 mg IV for abd pain right side 8/10. See MAR and pain assessments. [id number]- Patient made aware of NGT to be remove; voiced understanding. Patient tolerated well. See hygiene. Call bell w/in reach. 0- Dr. Joslyn Orosco was called via [employer] urgent message made aware of Patient c/o itching \"all over\" and denies having throat issues said the Morphine caused the itching. Patient requesting IV Benadryl said that he PO Benadryl does not work; awaiting call back. [phone]- Patient resting said that itching continues. Dr. Joslyn Orosco was called made aware of Patient c/o itching and said that PO benadryl does not work. Also made aware that NGT has been removed. T.O. dc IV Morphine; Percocet 5- 325 mg PO every 6 hours prn and Benadryl 25 mg PO every 6 hours prn itching (RBV). 1617- Patient c/o itching and is scratching. Benadryl 25 mg PO given. Call bell w/in reach. 1713- Patient said that itching is a little better. Patient given Percocet 5- 325 mg PO for abd pain right side 8/10. See MAR and pain assessments. Call bell w/in reach.

## 2020-09-12 NOTE — PROGRESS NOTES
Problem: Pain  Goal: *Control of Pain  Outcome: Progressing Towards Goal     Problem: Upper and Lower GI Bleed: Day 1  Goal: Activity/Safety  Outcome: Progressing Towards Goal  Goal: Consults, if ordered  Outcome: Progressing Towards Goal  Goal: Diagnostic Test/Procedures  Outcome: Progressing Towards Goal  Goal: Nutrition/Diet  Outcome: Progressing Towards Goal  Goal: Discharge Planning  Outcome: Progressing Towards Goal  Goal: Medications  Outcome: Progressing Towards Goal  Goal: Respiratory  Outcome: Progressing Towards Goal  Goal: Treatments/Interventions/Procedures  Outcome: Progressing Towards Goal  Goal: Psychosocial  Outcome: Progressing Towards Goal  Goal: *Optimal pain control at patient's stated goal  Outcome: Progressing Towards Goal  Goal: *Hemodynamically stable  Outcome: Progressing Towards Goal  Goal: *Demonstrates progressive activity  Outcome: Progressing Towards Goal     Problem: Falls - Risk of  Goal: *Absence of Falls  Description: Document Kavya Fall Risk and appropriate interventions in the flowsheet.   Outcome: Progressing Towards Goal  Note: Fall Risk Interventions:     Medication Interventions: Evaluate medications/consider consulting pharmacy, Patient to call before getting OOB, Teach patient to arise slowly

## 2020-09-12 NOTE — PROGRESS NOTES
Problem: Pain  Goal: *Control of Pain  9/12/2020 0953 by Chrissy Blanca RN  Outcome: Progressing Towards Goal  9/12/2020 0950 by Chrissy Blanca RN  Outcome: Progressing Towards Goal     Problem: Patient Education: Go to Patient Education Activity  Goal: Patient/Family Education  9/12/2020 0953 by Chrissy Blanca RN  Outcome: Progressing Towards Goal  9/12/2020 0950 by Chrissy Blanca RN  Outcome: Progressing Towards Goal     Problem: Upper and Lower GI Bleed: Day 2  Goal: Activity/Safety  Outcome: Progressing Towards Goal     Problem: Upper and Lower GI Bleed: Day 2  Goal: Respiratory  Outcome: Progressing Towards Goal

## 2020-09-13 VITALS
RESPIRATION RATE: 18 BRPM | OXYGEN SATURATION: 100 % | DIASTOLIC BLOOD PRESSURE: 74 MMHG | HEIGHT: 64 IN | WEIGHT: 148.2 LBS | HEART RATE: 74 BPM | TEMPERATURE: 98.1 F | BODY MASS INDEX: 25.3 KG/M2 | SYSTOLIC BLOOD PRESSURE: 125 MMHG

## 2020-09-13 LAB
ANION GAP SERPL CALC-SCNC: 6 MMOL/L (ref 3–18)
BUN SERPL-MCNC: 10 MG/DL (ref 7–18)
BUN/CREAT SERPL: 12 (ref 12–20)
CALCIUM SERPL-MCNC: 8.3 MG/DL (ref 8.5–10.1)
CHLORIDE SERPL-SCNC: 110 MMOL/L (ref 100–111)
CO2 SERPL-SCNC: 25 MMOL/L (ref 21–32)
CREAT SERPL-MCNC: 0.82 MG/DL (ref 0.6–1.3)
EST. AVERAGE GLUCOSE BLD GHB EST-MCNC: 134 MG/DL
GLUCOSE BLD STRIP.AUTO-MCNC: 122 MG/DL (ref 70–110)
GLUCOSE SERPL-MCNC: 127 MG/DL (ref 74–99)
HBA1C MFR BLD: 6.3 % (ref 4.2–5.6)
POTASSIUM SERPL-SCNC: 4.4 MMOL/L (ref 3.5–5.5)
SODIUM SERPL-SCNC: 141 MMOL/L (ref 136–145)

## 2020-09-13 PROCEDURE — 2709999900 HC NON-CHARGEABLE SUPPLY

## 2020-09-13 PROCEDURE — 74011250637 HC RX REV CODE- 250/637: Performed by: HOSPITALIST

## 2020-09-13 PROCEDURE — 36415 COLL VENOUS BLD VENIPUNCTURE: CPT

## 2020-09-13 PROCEDURE — 80048 BASIC METABOLIC PNL TOTAL CA: CPT

## 2020-09-13 PROCEDURE — 83036 HEMOGLOBIN GLYCOSYLATED A1C: CPT

## 2020-09-13 PROCEDURE — 82962 GLUCOSE BLOOD TEST: CPT

## 2020-09-13 PROCEDURE — 74011250636 HC RX REV CODE- 250/636: Performed by: HOSPITALIST

## 2020-09-13 PROCEDURE — 74011000250 HC RX REV CODE- 250: Performed by: HOSPITALIST

## 2020-09-13 RX ORDER — DEXTROSE MONOHYDRATE 25 G/50ML
25-50 INJECTION, SOLUTION INTRAVENOUS AS NEEDED
Status: DISCONTINUED | OUTPATIENT
Start: 2020-09-13 | End: 2020-09-13 | Stop reason: HOSPADM

## 2020-09-13 RX ORDER — MAGNESIUM SULFATE 100 %
4 CRYSTALS MISCELLANEOUS AS NEEDED
Status: DISCONTINUED | OUTPATIENT
Start: 2020-09-13 | End: 2020-09-13 | Stop reason: HOSPADM

## 2020-09-13 RX ORDER — INSULIN LISPRO 100 [IU]/ML
INJECTION, SOLUTION INTRAVENOUS; SUBCUTANEOUS
Status: DISCONTINUED | OUTPATIENT
Start: 2020-09-13 | End: 2020-09-13 | Stop reason: HOSPADM

## 2020-09-13 RX ADMIN — ENOXAPARIN SODIUM 40 MG: 40 INJECTION SUBCUTANEOUS at 09:34

## 2020-09-13 RX ADMIN — OXYCODONE HYDROCHLORIDE AND ACETAMINOPHEN 1 TABLET: 5; 325 TABLET ORAL at 05:11

## 2020-09-13 RX ADMIN — DEXTROSE MONOHYDRATE, SODIUM CHLORIDE, AND POTASSIUM CHLORIDE 125 ML/HR: 50; 4.5; 1.49 INJECTION, SOLUTION INTRAVENOUS at 05:04

## 2020-09-13 RX ADMIN — DIPHENHYDRAMINE HYDROCHLORIDE 25 MG: 25 CAPSULE ORAL at 05:11

## 2020-09-13 RX ADMIN — SODIUM CHLORIDE 10 ML: 9 INJECTION, SOLUTION INTRAMUSCULAR; INTRAVENOUS; SUBCUTANEOUS at 07:53

## 2020-09-13 NOTE — DISCHARGE INSTRUCTIONS
Activity: Activity as tolerated     Diet: Diabetic Diet     Wound Care: None needed     Follow-up: 1 week with PCP    --------------------------------------------------------------------------------------------    DISCHARGE SUMMARY from Nurse    PATIENT INSTRUCTIONS:    After general anesthesia or intravenous sedation, for 24 hours or while taking prescription Narcotics:  · Limit your activities  · Do not drive and operate hazardous machinery  · Do not make important personal or business decisions  · Do  not drink alcoholic beverages  · If you have not urinated within 8 hours after discharge, please contact your surgeon on call. Report the following to your surgeon:  · Excessive pain, swelling, redness or odor of or around the surgical area  · Temperature over 100.5  · Nausea and vomiting lasting longer than 4 hours or if unable to take medications  · Any signs of decreased circulation or nerve impairment to extremity: change in color, persistent  numbness, tingling, coldness or increase pain  · Any questions    What to do at Home:  Recommended activity: Activity as tolerated,     If you experience any of the following symptoms listed below, please follow up with your PCP. *  Please give a list of your current medications to your Primary Care Provider. *  Please update this list whenever your medications are discontinued, doses are      changed, or new medications (including over-the-counter products) are added. *  Please carry medication information at all times in case of emergency situations. These are general instructions for a healthy lifestyle:    No smoking/ No tobacco products/ Avoid exposure to second hand smoke  Surgeon General's Warning:  Quitting smoking now greatly reduces serious risk to your health.     Obesity, smoking, and sedentary lifestyle greatly increases your risk for illness    A healthy diet, regular physical exercise & weight monitoring are important for maintaining a healthy lifestyle    You may be retaining fluid if you have a history of heart failure or if you experience any of the following symptoms:  Weight gain of 3 pounds or more overnight or 5 pounds in a week, increased swelling in our hands or feet or shortness of breath while lying flat in bed. Please call your doctor as soon as you notice any of these symptoms; do not wait until your next office visit. The discharge information has been reviewed with the patient. The patient verbalized understanding. Discharge medications reviewed with the patient and appropriate educational materials and side effects teaching were provided.   ___________________________________________________________________________________________________________________________________      Patient armband removed and shredded

## 2020-09-13 NOTE — ROUTINE PROCESS
Bedside and Verbal shift change report given to Kena Thomas RN (oncoming nurse) by Sally Gaffney RN, BSN (offgoing nurse). Report given with SBAR, Kardex, Intake/Output, MAR and Recent Results.

## 2020-09-13 NOTE — PROGRESS NOTES
0830 report received from [facility]    1100 discharge orders in    1300 discharge instructions given, verbalized understanding, no changes noted    1400  discharged safely with nurse Dayan Delgado from  No

## 2020-09-13 NOTE — PROGRESS NOTES
Patient received in bed awake. Patient A&O x4. F/u abd pain 5/10; see MAR for previous action and pain intervention. No distress noted. Frequently use items within reach. Bed locked in low position. Call bell within reach and Patient verbalized understanding of use for assistance and needs.

## 2020-09-13 NOTE — PROGRESS NOTES
Problem: Pain  Goal: *Control of Pain  Outcome: Progressing Towards Goal     Problem: Patient Education: Go to Patient Education Activity  Goal: Patient/Family Education  Outcome: Progressing Towards Goal     Problem: Patient Education: Go to Patient Education Activity  Goal: Patient/Family Education  Outcome: Progressing Towards Goal     Problem: Upper and Lower GI Bleed: Day 1  Goal: Activity/Safety  Outcome: Progressing Towards Goal     Problem: Upper and Lower GI Bleed: Day 3  Goal: Activity/Safety  Outcome: Progressing Towards Goal     Problem: Upper and Lower GI Bleed:  Discharge Outcomes  Goal: *Pain is controlled to three or less  Outcome: Progressing Towards Goal     Problem: Falls - Risk of  Goal: *Absence of Falls  Description: Document Kavya Fall Risk and appropriate interventions in the flowsheet.   Outcome: Progressing Towards Goal  Note: Fall Risk Interventions:            Medication Interventions: Bed/chair exit alarm, Teach patient to arise slowly, Patient to call before getting OOB         History of Falls Interventions: Door open when patient unattended

## 2020-09-13 NOTE — PROGRESS NOTES
General Surgery Consult      [facility]  Admit date: 2020    MRN: [id number]     : 1960     Age: 61 y.o. Attending Physician: Lesley Adame MD, FACS      Subjective:     Darin Camacho is a 61 y.o. female who we are following for a picture of SBO. She is doing great and passing flatus and tolerating liquid diet. She is hungry and wants to go home. She has no fever or tachycardia. She denies any nausea or vomiting or abdominal pain.      Patient Active Problem List    Diagnosis Date Noted    Small bowel obstruction ([address] 2020    Anemia 08/10/2019    S/P small bowel resection 2019    Adnexal cyst 2019    Chronic pelvic pain in female 2019    Bowel obstruction (HCC) 2018    Non-intractable cyclical vomiting with nausea 2018    Type 2 diabetes mellitus with nephropathy ([address] 2018    Chronic abdominal pain 2017    Post-operative pain 2017    SBO (small bowel obstruction) (HCC) 2017    Osteoarthritis of left knee 2016    Hypertension 2016    Hyperlipidemia 2016    GERD (gastroesophageal reflux disease) 2016    Asthma 2016    Type 2 diabetes mellitus ([address] 2016    Sural neuritis 2014    Chest pain 2014    Essential hypertension, benign 2012    Generalized abdominal pain      Past Medical History:   Diagnosis Date    Abdominal adhesions     Adnexal cyst 2019    Left    Anemia     Asthma     Chronic pelvic pain in female 2019    Diabetes mellitus     Dyskinesia     bilateral    Essential hypertension     GERD (gastroesophageal reflux disease)     Hypertension     Menopause     Microhematuria     Rheumatoid arteritis ([address]     Stool color black       Past Surgical History:   Procedure Laterality Date    COLONOSCOPY N/A 2019    COLONOSCOPY performed by Junior Navid MD at [address] [place] [address]  6/28/10  HX COLONOSCOPY      HX ENDOSCOPY      HX HERNIA REPAIR      HX HYSTERECTOMY  1989    Fibroids    HX KNEE REPLACEMENT Left     HX KNEE REPLACEMENT Right 06/2018    HX OOPHORECTOMY  12/2000    HX ORTHOPAEDIC Right     ankle- multiple surgeries    HX SMALL BOWEL RESECTION  12/2000    HX SMALL BOWEL RESECTION  02/21/2017    Dr. Rene Mchugh        Social History     Tobacco Use    Smoking status: Never Smoker    Smokeless tobacco: Never Used   Substance Use Topics    Alcohol use: No      Social History     Tobacco Use   Smoking Status Never Smoker   Smokeless Tobacco Never Used     Family History   Problem Relation Age of Onset    Hypertension Other         parent    Breast Cancer Other 21    Heart Disease Father     Diabetes Father     Lung Disease Father     Diabetes Mother     Hypertension Other         sibling    Diabetes Other         parent    Kidney Disease Brother     Diabetes Brother     Lung Disease Brother       Current Facility-Administered Medications   Medication Dose Route Frequency    insulin lispro (HUMALOG) injection   SubCUTAneous AC&HS    glucose chewable tablet 16 g  4 Tab Oral PRN    glucagon (GLUCAGEN) injection 1 mg  1 mg IntraMUSCular PRN    dextrose (D50) infusion 12.5-25 g  25-50 mL IntraVENous PRN    oxyCODONE-acetaminophen (PERCOCET) 5-325 mg per tablet 1 Tab  1 Tab Oral Q6H PRN    diphenhydrAMINE (BENADRYL) capsule 25 mg  25 mg Oral Q6H PRN    sodium chloride (NS) flush 5-40 mL  5-40 mL IntraVENous Q8H    sodium chloride (NS) flush 5-40 mL  5-40 mL IntraVENous PRN    acetaminophen (TYLENOL) tablet 650 mg  650 mg Oral Q6H PRN    Or    acetaminophen (TYLENOL) suppository 650 mg  650 mg Rectal Q6H PRN    polyethylene glycol (MIRALAX) packet 17 g  17 g Oral DAILY PRN    promethazine (PHENERGAN) tablet 12.5 mg  12.5 mg Oral Q6H PRN    Or    ondansetron (ZOFRAN) injection 4 mg  4 mg IntraVENous Q6H PRN    enoxaparin (LOVENOX) injection 40 mg  40 mg SubCUTAneous DAILY    naloxone (NARCAN) injection 0.4 mg  0.4 mg IntraVENous PRN      Allergies   Allergen Reactions    Macrodantin [Nitrofurantoin Macrocrystalline] Itching and Other (comments)    Tape [Adhesive] Other (comments)     Paper tape-- feels like burning skin  Burned skin when had wound vac        Review of Systems:  Pertinent items are noted in the History of Present Illness.       Objective:     Visit Vitals  /85 (BP 1 Location: Left arm, BP Patient Position: At rest)   Pulse 86   Temp 98.1 °F (36.7 °C)   Resp 18   Ht 5' 4\" (1.626 m)   Wt 67.2 kg (148 lb 3.2 oz)   SpO2 99%   BMI 25.44 kg/m²       Physical Exam:      General:  in no apparent distress, alert, oriented times 3, afebrile, normal vitals, anicteric and cooperative   Eyes:  conjunctivae and sclerae normal, pupils equal, round, reactive to light               Abdomen:   flat, soft, nontender, nondistended, no masses or organomegaly                 Imaging and Lab Review:     CBC:   Lab Results   Component Value Date/Time    WBC 7.6 09/12/2020 04:20 AM    RBC 4.89 09/12/2020 04:20 AM    HGB 13.0 09/12/2020 04:20 AM    HCT 41.8 09/12/2020 04:20 AM    PLATELET 659 13/28/2051 04:20 AM     BMP:   Lab Results   Component Value Date/Time    Glucose 127 (H) 09/13/2020 04:25 AM    Sodium 141 09/13/2020 04:25 AM    Potassium 4.4 09/13/2020 04:25 AM    Chloride 110 09/13/2020 04:25 AM    CO2 25 09/13/2020 04:25 AM    BUN 10 09/13/2020 04:25 AM    Creatinine 0.82 09/13/2020 04:25 AM    Calcium 8.3 (L) 09/13/2020 04:25 AM     CMP:  Lab Results   Component Value Date/Time    Glucose 127 (H) 09/13/2020 04:25 AM    Sodium 141 09/13/2020 04:25 AM    Potassium 4.4 09/13/2020 04:25 AM    Chloride 110 09/13/2020 04:25 AM    CO2 25 09/13/2020 04:25 AM    BUN 10 09/13/2020 04:25 AM    Creatinine 0.82 09/13/2020 04:25 AM    Calcium 8.3 (L) 09/13/2020 04:25 AM    Anion gap 6 09/13/2020 04:25 AM    BUN/Creatinine ratio 12 09/13/2020 04:25 AM    Alk. phosphatase 112 09/11/2020 06:34 PM    Protein, total 8.0 09/11/2020 06:34 PM    Albumin 3.3 (L) 09/11/2020 06:34 PM    Globulin 4.7 (H) 09/11/2020 06:34 PM    A-G Ratio 0.7 (L) 09/11/2020 06:34 PM       Recent Results (from the past 24 hour(s))   METABOLIC PANEL, BASIC    Collection Time: 09/13/20  4:25 AM   Result Value Ref Range    Sodium 141 136 - 145 mmol/L    Potassium 4.4 3.5 - 5.5 mmol/L    Chloride 110 100 - 111 mmol/L    CO2 25 21 - 32 mmol/L    Anion gap 6 3.0 - 18 mmol/L    Glucose 127 (H) 74 - 99 mg/dL    BUN 10 7.0 - 18 MG/DL    Creatinine 0.82 0.6 - 1.3 MG/DL    BUN/Creatinine ratio 12 12 - 20      GFR est AA >60 >60 ml/min/1.73m2    GFR est non-AA >60 >60 ml/min/1.73m2    Calcium 8.3 (L) 8.5 - 10.1 MG/DL       images and reports reviewed    Assessment:   Dolores Schroeder is a 61 y.o. female is presenting with abdominal pain and a picture of bowel obstruction that has resolved. Plan:     Regular diet  Discharge home today. Appreciate medicine help.     Please call me if you have any questions (cell phone: [phone])     Signed By: Christy Arellano MD     September 13, 2020

## 2020-09-13 NOTE — ROUTINE PROCESS
Bedside and Verbal shift change report given to  Pedro Beach RN (Staff nurse) by Mona Acosta RN, BSN (offgoing nurse). Report given with SBAR, Kardex, Intake/Output, MAR and Recent Results.

## 2020-09-13 NOTE — PROGRESS NOTES
Discharge/Transition Planning     Problem: Discharge Planning  Goal: *Discharge to safe environment  Outcome: Resolved/Met    Care Management Interventions  PCP Verified by CM:  Yes  Mode of Transport at Discharge: Self  Transition of Care Consult (CM Consult): Discharge Planning  Current Support Network: Lives with Spouse, Own Home  Confirm Follow Up Transport: Self  The Plan for Transition of Care is Related to the Following Treatment Goals : resolution of acute needs  Discharge Location  Discharge Placement: Home

## 2020-09-13 NOTE — DISCHARGE SUMMARY
2 [facility]  Hospitalist Division    Discharge Summary    Patient: Ed Anaya MRN: [id number]  CSN: [id number]    YOB: 1960  Age: 61 y.o. Sex: female    DOA: 9/11/2020 LOS:  LOS: 2 days   Discharge Date:      Admission Diagnoses: Small bowel obstruction ([facility] [N65.257]    Discharge Diagnoses:  Principal Problem:    Small bowel obstruction ([address] (9/11/2020)    Active Problems:    Type 2 diabetes mellitus ([address] (6/27/2016)      Hypertension (6/27/2016)      Hyperlipidemia (6/27/2016)        Discharge Condition: Stable    Discharge To: Home    Consults: General Surgery    HPI: [facility] a 18 y.o. female who has had multiple surgeries for SBO in the past.  She began having abdominal pain two nights ago. Serenity Mercado had vomiting over the course of the day yesterday.  This worsened as the day went on.  She had a small bowel movement two days ago.  No fever.  She presented to the ER where she was found to have SBO. NGT placed. She will be admitted for ongoing management. Hospital Course: General surgery was consulted. Patient improved clinically, NGT removed. Patient is tolerating diet advancement. +Flatus. GS cleared patient for discharge home. VS and labs stable for discharge. Will d/c QID Bentyl in PTA medications due to common side effect of constipation. Physical Exam:  General appearance: alert, cooperative, no distress, appears stated age  Head: Normocephalic, without obvious abnormality, atraumatic  Lungs: clear to auscultation bilaterally  Heart: regular rate and rhythm, S1, S2 normal, no murmur, click, rub or gallop  Abdomen: soft, non-tender.  Bowel sounds normal. No masses,  no organomegaly  Extremities: no cyanosis or edema  Skin: Skin color, texture normal. No rashes or lesions  Neurologic: no focal deficits, motor/sensory intact  PSY: Mood and affect normal, appropriately behaved    Significant Diagnostic Studies:     BMP:   Lab Results   Component Value Date/Time     09/13/2020 04:25 AM    K 4.4 09/13/2020 04:25 AM     09/13/2020 04:25 AM    CO2 25 09/13/2020 04:25 AM    AGAP 6 09/13/2020 04:25 AM     (H) 09/13/2020 04:25 AM    BUN 10 09/13/2020 04:25 AM    CREA 0.82 09/13/2020 04:25 AM    GFRAA >60 09/13/2020 04:25 AM    GFRNA >60 09/13/2020 04:25 AM       Xr Abd (kub)    Result Date: 9/12/2020  EXAM: Portable abdomen x-ray, one view 9/11/2020 HISTORY: Nasogastric tube placement. COMPARISON: 1/14/2020. FINDINGS: A nasogastric tube is identified with its tip in the distal stomach. Scattered gas and stool are noted throughout the colon. There are no dilated loops of large or small bowel. Contrast material is noted in the renal collecting systems and bladder, consistent with the sequela of recent CT. Abdominal bowel gas pattern, as visualized, is unremarkable. IMPRESSION: 1. Nasogastric tube with its tip in the stomach. Note: Preliminary report was provided by the on-call radiology resident. Ct Abd Pelv W Cont    Result Date: 9/12/2020  EXAM: CT of the abdomen and pelvis with contrast 9/11/2020. INDICATION: Right lower quadrant abdominal pain. Pain radiates to the back. COMPARISON: CT scan of the abdomen and pelvis from 7/30/2020. TECHNIQUE: Axial CT imaging of the abdomen and pelvis was performed during the dynamic infusion of 80 cc of Isovue 300. Multiplanar reformats were generated. Dose reduction techniques:  Automated exposure control, mAs and/or kVp reductions based on patient size, and iterative reconstruction. The specific techniques utilized on this CT exam have been documented in the patient's electronic medical record. Digital imaging and communications and medicine (DICOM) format image data are available to nonaffiliated external healthcare facilities or entities on a secure, media free, reciprocally searchable basis with patient authorization for at least a 12 month period after this study. _______________ FINDINGS: LIVER, BILIARY: The liver is homogeneously low in attenuation, consistent with underlying hepatic steatosis which is unchanged. No focal hepatic lesions are identified. The gallbladder is surgically absent. No biliary dilation. PANCREAS: WNL. SPLEEN: WNL. ADRENALS: Unremarkable. KIDNEYS: Parapelvic renal cysts on the left are unchanged. There is no evidence of urolithiasis or hydronephrosis. No focal parenchymal abnormalities are identified. LYMPH NODES: WNL. PERITONEAL CAVITY AND GASTROINTESTINAL TRACT: Postsurgical changes are again noted status post midline laparotomy with small bowel anastomosis. The small bowel loops are closely approximated to the ventral abdominal wall and there is mild infiltration in the abdominal wall soft tissues and musculature. Underlying adhesions cannot be excluded. The rectus abdominis muscles are atrophic. A few of the small bowel loops along the ventral abdomen are mildly prominent and fluid-filled but the small bowel loops proximal and distal to these regions do not appear to be dilated. Scattered gas and stool are noted throughout the colon. There are scattered colonic diverticula but there are no CT findings to suggest diverticulitis. There is no free intraperitoneal air or fluid. VASCULATURE: Scattered vascular calcifications are noted. LOWER CHEST: Mild bibasilar atelectasis is present. PELVIC VISCERA: Evaluation of the pelvis demonstrates a contracted urinary bladder which is grossly unremarkable. There is a 4.0 x 2.8 cm cystic mass in the left side of the pelvis is unchanged in size and configuration. BONES: No acute or aggressive osseous abnormalities are identified. OTHER: None. _______________     IMPRESSION: 1. Postsurgical changes, as described above. Small bowel adhesions cannot be excluded but there are no CT findings to suggest complete obstruction. However, a component of mild, partial obstruction may be present.  2. Indeterminant cystic mass in the left side of the pelvis, grossly unchanged since 2018. 3.  Probable hepatic steatosis, unchanged. 4.  Colonic diverticulosis without CT evidence of diverticulitis. Procedures: None    Discharge Medications:     Current Discharge Medication List      CONTINUE these medications which have NOT CHANGED    Details   dapagliflozin (FARXIGA) 10 mg tab tablet Take 10 mg by mouth Every morning. metFORMIN ER (GLUCOPHAGE XR) 500 mg tablet take 2 tablets by mouth twice a day  Qty: 120 Tab, Refills: 5    Associated Diagnoses: Steroid-induced diabetes (HCC)      ondansetron (Zofran ODT) 4 mg disintegrating tablet Take 1 Tab by mouth every eight (8) hours as needed for Nausea or Vomiting. Qty: 15 Tab, Refills: 0      bisoprolol-hydroCHLOROthiazide (ZIAC) 2.5-6.25 mg per tablet take 1 tablet by mouth once daily  Qty: 90 Tab, Refills: 3    Associated Diagnoses: Essential hypertension      doxepin (SINEquan) 25 mg capsule Take 1 Cap by mouth nightly. Qty: 90 Cap, Refills: 3    Associated Diagnoses: Insomnia secondary to chronic pain      amLODIPine (NORVASC) 5 mg tablet Take 1 Tab by mouth daily. Qty: 30 Tab, Refills: 0    Associated Diagnoses: Essential hypertension, benign      albuterol sulfate (PROAIR RESPICLICK) 90 mcg/actuation aepb Take 2 Puffs by inhalation every four (4) hours as needed for Wheezing, Shortness of Breath or Cough. Qty: 1 Inhaler, Refills: 0      albuterol (PROVENTIL VENTOLIN) 2.5 mg /3 mL (0.083 %) nebu 3 mL by Nebulization route every six (6) hours as needed for Wheezing. Qty: 30 Nebule, Refills: 1    Associated Diagnoses: Moderate persistent asthma without complication      multivitamin (ONE A DAY) tablet Take 1 Tab by mouth daily.       omeprazole (PRILOSEC) 10 mg capsule take 1 capsule by mouth once daily  Qty: 30 Cap, Refills: 11    Associated Diagnoses: Gastroesophageal reflux disease without esophagitis      acetaminophen (TYLENOL) 325 mg tablet Take 2 Tabs by mouth every four (4) hours as needed for Pain.   Qty: 20 Tab, Refills: 0      glucose blood VI test strips (ONETOUCH ULTRA TEST) strip Check blood glucose as directed  Qty: 100 Strip, Refills: 1    Associated Diagnoses: Type 2 diabetes mellitus with hyperglycemia, without long-term current use of insulin (HCC)         STOP taking these medications       dicyclomine (BENTYL) 20 mg tablet Comments:   Reason for Stopping: constipation              Activity: Activity as tolerated    Diet: Diabetic Diet    Wound Care: None needed    Follow-up: 1 week with PCP    Discharge time: >35 minutes    FAITH [address]  Office:  [phone]  Pager: [phone]      9/13/2020, 9:45 AM

## 2020-09-13 NOTE — PROGRESS NOTES
0710 = Bedside and Verbal shift change report given to Chandrika [address] (oncoming nurse) by Zach Camacho RN  (offgoing nurse). Report included the following information SBAR, Kardex, Intake/Output, MAR and Recent Results.

## 2020-09-13 NOTE — PROGRESS NOTES
Problem: Pain  Goal: *Control of Pain  Outcome: Resolved/Met     Problem: Patient Education: Go to Patient Education Activity  Goal: Patient/Family Education  Outcome: Resolved/Met     Problem: Patient Education: Go to Patient Education Activity  Goal: Patient/Family Education  Outcome: Resolved/Met     Problem: Upper and Lower GI Bleed: Day 1  Goal: Activity/Safety  Outcome: Resolved/Met  Goal: Consults, if ordered  Outcome: Resolved/Met  Goal: Diagnostic Test/Procedures  Outcome: Resolved/Met  Goal: Nutrition/Diet  Outcome: Resolved/Met  Goal: Discharge Planning  Outcome: Resolved/Met  Goal: Medications  Outcome: Resolved/Met  Goal: Respiratory  Outcome: Resolved/Met  Goal: Treatments/Interventions/Procedures  Outcome: Resolved/Met  Goal: Psychosocial  Outcome: Resolved/Met  Goal: *Optimal pain control at patient's stated goal  Outcome: Resolved/Met  Goal: *Hemodynamically stable  Outcome: Resolved/Met  Goal: *Demonstrates progressive activity  Outcome: Resolved/Met     Problem: Upper and Lower GI Bleed: Day 2  Goal: Off Pathway (Use only if patient is Off Pathway)  Outcome: Resolved/Met  Goal: Activity/Safety  Outcome: Resolved/Met  Goal: Consults, if ordered  Outcome: Resolved/Met  Goal: Diagnostic Test/Procedures  Outcome: Resolved/Met  Goal: Nutrition/Diet  Outcome: Resolved/Met  Goal: Discharge Planning  Outcome: Resolved/Met  Goal: Medications  Outcome: Resolved/Met  Goal: Respiratory  Outcome: Resolved/Met  Goal: Treatments/Interventions/Procedures  Outcome: Resolved/Met  Goal: Psychosocial  Outcome: Resolved/Met  Goal: *Optimal pain control at patient's stated goal  Outcome: Resolved/Met  Goal: *Hemodynamically stable  Outcome: Resolved/Met  Goal: *Tolerating diet  Outcome: Resolved/Met  Goal: *Demonstrates progressive activity  Outcome: Resolved/Met     Problem: Upper and Lower GI Bleed: Day 3  Goal: Off Pathway (Use only if patient is Off Pathway)  Outcome: Resolved/Met  Goal: Activity/Safety  Outcome: Resolved/Met  Goal: Diagnostic Test/Procedures  Outcome: Resolved/Met  Goal: Nutrition/Diet  Outcome: Resolved/Met  Goal: Discharge Planning  Outcome: Resolved/Met  Goal: Medications  Outcome: Resolved/Met  Goal: Treatments/Interventions/Procedures  Outcome: Resolved/Met  Goal: Psychosocial  Outcome: Resolved/Met     Problem: Upper and Lower GI Bleed:  Discharge Outcomes  Goal: *Hemodynamically stable  Outcome: Resolved/Met  Goal: *Lungs clear or at baseline  Outcome: Resolved/Met  Goal: *Demonstrates independent activity or return to baseline  Outcome: Resolved/Met  Goal: *Pain is controlled to three or less  Outcome: Resolved/Met  Goal: *Verbalizes understanding and describes prescribed diet  Outcome: Resolved/Met  Goal: *Tolerating diet  Outcome: Resolved/Met  Goal: *Verbalizes name, dosage, time, side effects, and number of days to continue medications  Outcome: Resolved/Met  Goal: *Anxiety reduced or absent  Outcome: Resolved/Met  Goal: *Understands and describes signs and symptoms to report to providers(Stroke Metric)  Outcome: Resolved/Met  Goal: *Describes follow-up/return visits to physicians  Outcome: Resolved/Met  Goal: *Describes available resources and support systems  Outcome: Resolved/Met     Problem: Falls - Risk of  Goal: *Absence of Falls  Description: Document Kavya Fall Risk and appropriate interventions in the flowsheet.   Outcome: Resolved/Met  Note: Fall Risk Interventions:            Medication Interventions: Evaluate medications/consider consulting pharmacy         History of Falls Interventions: Evaluate medications/consider consulting pharmacy

## 2020-09-13 NOTE — PROGRESS NOTES
Internal Medicine Progress Note    Patient's Name: Dolores Schroeder  Admit Date: 9/11/2020  Length of Stay: 2      Assessment/Plan     Principal Problem:    Small bowel obstruction ([facility] (9/11/2020)    Active Problems:    Type 2 diabetes mellitus ([facility] (6/27/2016)      Hypertension (6/27/2016)      Hyperlipidemia (6/27/2016)      Partial SBO  - passing flatus, has not had bm yet  - would like to advance to regular diet, will leave up to surgery  - minimal pain currently, have advised limiting requests for narcotics  - appreciate GS assistance    DM  - ssi  - monitor achs    HTN  - hold home meds, stable without    - Cont acceptable home medications for chronic conditions   - DVT protocol    I have personally reviewed all pertinent labs and films that have officially resulted over the last 24 hours. I have personally checked for all pending labs that are awaiting final results. Anticipated discharge: 1-2 days    HPI     Dolores Schroeder is a 61 y.o. female who has had multiple surgeries for SBO in the past.  She began having abdominal pain two nights ago. She had vomiting over the course of the day yesterday. This worsened as the day went on. She had a small bowel movement two days ago. No fever. She presented to the ER where she was found to have SBO. NGT placed. She will be admitted for ongoing management. Hospital Course     Patient improved clinically, NGT removed. Clear liquids started which she tolerated well. Subjective     Pt s/e @ bedside. No major events overnight. Pt states abd pain 4/10, +flatus, -bm.     Objective     Visit Vitals  /85 (BP 1 Location: Left arm, BP Patient Position: At rest)   Pulse 86   Temp 98.1 °F (36.7 °C)   Resp 18   Ht 5' 4\" (1.626 m)   Wt 67.2 kg (148 lb 3.2 oz)   SpO2 99%   BMI 25.44 kg/m²       Physical Exam:  General Appearance: NAD, conversant  HENT: normocephalic/atraumatic, moist mucus membranes  Neck: No JVD, supple  Lungs: CTA with normal respiratory effort  CV: RRR, no m/r/g  Abdomen: soft, non-tender, normal bowel sounds  Extremities: no cyanosis, no peripheral edema  Neuro: No focal deficits, motor/sensory intact  Skin: Normal color, intact      Intake and Output:  Current Shift:  No intake/output data recorded. Last three shifts:  09/11 1901 - 09/13 0700  In: 3757.5 [P.O.:1020; I.V.:2737.5]  Out: 2000 [Urine:1900]    Lab/Data Reviewed:  BMP:   Lab Results   Component Value Date/Time     09/13/2020 04:25 AM    K 4.4 09/13/2020 04:25 AM     09/13/2020 04:25 AM    CO2 25 09/13/2020 04:25 AM    AGAP 6 09/13/2020 04:25 AM     (H) 09/13/2020 04:25 AM    BUN 10 09/13/2020 04:25 AM    CREA 0.82 09/13/2020 04:25 AM    GFRAA >60 09/13/2020 04:25 AM    GFRNA >60 09/13/2020 04:25 AM     CBC: No results found for: WBC, HGB, HGBEXT, HCT, HCTEXT, PLT, PLTEXT, HGBEXT, HCTEXT, PLTEXT    Imaging Reviewed:  No results found.     Medications Reviewed:  Current Facility-Administered Medications   Medication Dose Route Frequency    oxyCODONE-acetaminophen (PERCOCET) 5-325 mg per tablet 1 Tab  1 Tab Oral Q6H PRN    diphenhydrAMINE (BENADRYL) capsule 25 mg  25 mg Oral Q6H PRN    sodium chloride (NS) flush 5-40 mL  5-40 mL IntraVENous Q8H    sodium chloride (NS) flush 5-40 mL  5-40 mL IntraVENous PRN    acetaminophen (TYLENOL) tablet 650 mg  650 mg Oral Q6H PRN    Or    acetaminophen (TYLENOL) suppository 650 mg  650 mg Rectal Q6H PRN    polyethylene glycol (MIRALAX) packet 17 g  17 g Oral DAILY PRN    promethazine (PHENERGAN) tablet 12.5 mg  12.5 mg Oral Q6H PRN    Or    ondansetron (ZOFRAN) injection 4 mg  4 mg IntraVENous Q6H PRN    enoxaparin (LOVENOX) injection 40 mg  40 mg SubCUTAneous DAILY    dextrose 5% - 0.45% NaCl with KCl 20 mEq/L infusion  125 mL/hr IntraVENous CONTINUOUS    naloxone (NARCAN) injection 0.4 mg  0.4 mg IntraVENous PRN         Chago Pires PA-C  [facility]  Office:  [phone]  Pager: [phone]

## 2020-09-14 ENCOUNTER — PATIENT OUTREACH (OUTPATIENT)
Dept: CASE MANAGEMENT | Age: 60
End: 2020-09-14

## 2020-09-14 NOTE — ACP (ADVANCE CARE PLANNING)
Advance Care Planning:   Does patient have an Advance Directive: currently not on file; education provided

## 2020-09-14 NOTE — PROGRESS NOTES
Patient contacted regarding recent discharge and COVID-19 risk. Discussed COVID-19 related testing which was not done at this time. Test results were not done. Patient informed of results, if available? n/a    Care Transition Nurse/ Ambulatory Care Manager/ LPN Care Coordinator contacted the patient by telephone to perform post discharge assessment. Verified name and  with patient as identifiers. Patient has following risk factors of: asthma, diabetes and htn. CTN/ACM/LPN reviewed discharge instructions, medical action plan and red flags related to discharge diagnosis. Reviewed and educated them on any new and changed medications related to discharge diagnosis. Advised obtaining a 90-day supply of all daily and as-needed medications. Advance Care Planning:   Does patient have an Advance Directive: currently not on file; education provided     Education provided regarding infection prevention, and signs and symptoms of COVID-19 and when to seek medical attention with patient who verbalized understanding. Discussed exposure protocols and quarantine from [facility] you at higher risk for severe illness  and given an opportunity for questions and concerns. The patient agrees to contact the COVID-19 hotline [phone] or PCP office for questions related to their healthcare. CTN/ACM/LPN provided contact information for future reference. From CDC: Are you at higher risk for severe illness?  Wash your hands often.  Avoid close contact (6 feet, which is about two arm lengths) with people who are sick.  Put distance between yourself and other people if COVID-19 is spreading in your community.  Clean and disinfect frequently touched surfaces.  Avoid all cruise travel and non-essential air travel.  Call your healthcare professional if you have concerns about COVID-19 and your underlying condition or if you are sick.     For more information on steps you can take to protect yourself, see CDC's How to Protect Yourself      Patient/family/caregiver given information for Get[employer] Loop and agrees to enroll no  Patient's preferred e-mail:  n/a  Patient's preferred phone number: n/a  Based on Loop alert triggers, patient will be contacted by nurse care manager for worsening symptoms. Plan for follow-up call in 7-14 days based on severity of symptoms and risk factors.

## 2020-09-18 NOTE — PROGRESS NOTES
Problem: Pain  Goal: *Control of Pain  Outcome: Progressing Towards Goal     Problem: Patient Education: Go to Patient Education Activity  Goal: Patient/Family Education  Outcome: Progressing Towards Goal     Problem: Upper and Lower GI Bleed: Day 1  Goal: Nutrition/Diet  Outcome: Progressing Towards Goal     Problem: Upper and Lower GI Bleed: Day 1  Goal: Medications  Outcome: Progressing Towards Goal denies

## 2020-09-28 ENCOUNTER — PATIENT OUTREACH (OUTPATIENT)
Dept: CASE MANAGEMENT | Age: 60
End: 2020-09-28

## 2020-09-28 NOTE — PROGRESS NOTES
Date/Time:  9/28/2020 11:37 AM  Attempted to reach patient by telephone. Left HIPPA compliant message requesting a return call. This episode is resolved.

## 2020-09-29 ENCOUNTER — VIRTUAL VISIT (OUTPATIENT)
Dept: FAMILY MEDICINE CLINIC | Age: 60
End: 2020-09-29
Payer: COMMERCIAL

## 2020-09-29 DIAGNOSIS — G89.29 CHRONIC ABDOMINAL PAIN: ICD-10-CM

## 2020-09-29 DIAGNOSIS — E09.9 STEROID-INDUCED DIABETES MELLITUS, SEQUELA (HCC): ICD-10-CM

## 2020-09-29 DIAGNOSIS — K21.9 GASTROESOPHAGEAL REFLUX DISEASE WITHOUT ESOPHAGITIS: Chronic | ICD-10-CM

## 2020-09-29 DIAGNOSIS — T38.0X5S STEROID-INDUCED DIABETES MELLITUS, SEQUELA (HCC): ICD-10-CM

## 2020-09-29 DIAGNOSIS — R10.31 ABDOMINAL PAIN, RIGHT LOWER QUADRANT: ICD-10-CM

## 2020-09-29 DIAGNOSIS — G47.01 INSOMNIA SECONDARY TO CHRONIC PAIN: ICD-10-CM

## 2020-09-29 DIAGNOSIS — R10.9 CHRONIC ABDOMINAL PAIN: ICD-10-CM

## 2020-09-29 DIAGNOSIS — I10 ESSENTIAL HYPERTENSION: Primary | ICD-10-CM

## 2020-09-29 DIAGNOSIS — G89.29 INSOMNIA SECONDARY TO CHRONIC PAIN: ICD-10-CM

## 2020-09-29 DIAGNOSIS — E11.65 TYPE 2 DIABETES MELLITUS WITH HYPERGLYCEMIA, WITHOUT LONG-TERM CURRENT USE OF INSULIN (HCC): ICD-10-CM

## 2020-09-29 PROCEDURE — 99214 OFFICE O/P EST MOD 30 MIN: CPT | Performed by: FAMILY MEDICINE

## 2020-09-29 RX ORDER — OMEPRAZOLE 10 MG/1
CAPSULE, DELAYED RELEASE ORAL
Qty: 90 CAP | Refills: 4 | Status: SHIPPED | OUTPATIENT
Start: 2020-09-29 | End: 2021-11-26

## 2020-09-29 RX ORDER — HYDROCODONE BITARTRATE AND ACETAMINOPHEN 5; 325 MG/1; MG/1
1 TABLET ORAL
Qty: 12 TAB | Refills: 0 | Status: SHIPPED | OUTPATIENT
Start: 2020-09-29 | End: 2020-10-06

## 2020-09-29 NOTE — PROGRESS NOTES
Juliane James is a 61 y.o. black female and presents with    Chief Complaint   Patient presents with    Abdominal Pain    Asthma    Diabetes     Juliane James, who was evaluated through a synchronous (real-time) audio-video encounter, and/or her healthcare decision maker, is aware that it is a billable service, with coverage as determined by her insurance carrier. She provided verbal consent to proceed: Yes, and patient identification was verified. It was conducted pursuant to the emergency declaration under the [facility], [address] [place] [address] and the [employer] Act. A caregiver was present when appropriate. Ability to conduct physical exam was limited. I was in the office. The patient was at home. Subjective:  She is following up after hospitalization for bowel obstruction; she had ng tube and stayed inpatient; she did not require surgery. Diabetes Mellitus:  She has diabetes mellitus, and  hypertension and hyperlipidemia. Diabetic ROS - medication compliance: compliant all of the time, diabetic diet compliance: noncompliant some of the time. Lab review: labs reviewed, I note that glycosylated hemoglobin mildly abnormal but acceptable. Osteoarthritis and Chronic Pain:  Patient has osteoarthritis, primarily affecting the knees. Symptoms onset: problem is longstanding. Rheumatological ROS: ongoing significant pain in knees which is stable and controlled by PRN meds. Response to treatment plan: symptoms have progressed to a point and plateaued. .     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 Depression Review:  Patient is seen for followup of depression. Treatment includes none and no other therapies.    Ongoing symptoms include depressed mood, anhedonia, insomnia, fatigue, feelings of worthlessness/guilt, difficulty concentrating and hopelessness. She denies hypersomnia, impaired memory and recurrent thoughts of death.   She experiences the following side effects from the treatment: none. Anxiety Review:  Patient is seen for sleep disturbance. Current treatment includes doxepin and no other therapies. Ongoing symptoms include: insomnia. Patient denies: palpitations, sweating, chest pain, shortness of breath, dizziness, paresthesias, racing thoughts, psychomotor agitation, feelings of losing control, difficulty concentrating, suicidal ideation. Reported side effects from the treatment: none.     ROS   Constitutional: Negative for fever. HENT: Negative for sore throat.    Eyes: Negative for visual disturbance. Respiratory: Negative for shortness of breath.    Cardiovascular: Negative for chest pain. Gastrointestinal: Positive for abdominal pain. Endocrine: Negative for polyuria. Genitourinary: Positive for frequency. Negative for difficulty urinating and hematuria. Musculoskeletal: Positive for back pain. Negative for gait problem. Skin: wound site healing  Allergic/Immunologic: Negative for immunocompromised state. Neurological: Negative for syncope. Psychiatric/Behavioral: Positive for sleep disturbance.   All other systems reviewed and are negative. Objective: There were no vitals filed for this visit. alert, well appearing, and in no distress, oriented to person, place, and time and normal appearing weight  Mental status - normal mood, behavior, speech, dress, motor activity, and thought processes  Chest - normal work of breathing  Neurological - cranial nerves II through XII intact    LABS   hgb a1c 6.3  TESTS  CT abd/pelvis  IMPRESSION:     1. Postsurgical changes, as described above. Small bowel adhesions cannot be  excluded but there are no CT findings to suggest complete obstruction. However,  a component of mild, partial obstruction may be present.     2. Indeterminant cystic mass in the left side of the pelvis, grossly unchanged  since 2018.     3. Probable hepatic steatosis, unchanged.     4.  Colonic diverticulosis without CT evidence of diverticulitis. Assessment/Plan:    1. Abdominal pain, right lower quadrant  Pain management at this time; f/u with surgery for further evaluation  - HYDROcodone-acetaminophen (NORCO) 5-325 mg per tablet; Take 1 Tab by mouth every eight (8) hours as needed for Pain for up to 7 days. Max Daily Amount: 3 Tabs. Do not take tylenol with Norco since they both contain acetaminophen. Dispense: 12 Tab; Refill: 0    2. Chronic abdominal pain  Pain management  - HYDROcodone-acetaminophen (NORCO) 5-325 mg per tablet; Take 1 Tab by mouth every eight (8) hours as needed for Pain for up to 7 days. Max Daily Amount: 3 Tabs. Do not take tylenol with Norco since they both contain acetaminophen. Dispense: 12 Tab; Refill: 0    3. Essential hypertension  Goal <130/80    4. 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 Insomnia secondary to chronic pain  Sleep aid continued    6. Gastroesophageal reflux disease without esophagitis  Avoid exacerbating foods  - omeprazole (PRILOSEC) 10 mg capsule; take 1 capsule by mouth once daily  Dispense: 90 Cap; Refill: 4    7. Type 2 diabetes mellitus with hyperglycemia, without long-term current use of insulin ([facility])  Goal hgb a1c <7    Lab review: labs reviewed, I note that glycosylated hemoglobin mildly abnormal but acceptable      I have discussed the diagnosis with the patient and the intended plan as seen in the above orders. I have discussed medication side effects and warnings with the patient as well.  I have reviewed the plan of care with the patient, accepted their input and they are in agreement with the treatment goals.

## 2020-09-29 NOTE — LETTER
NOTIFICATION RETURN TO WORK  
 
9/29/2020 12:03 PM 
 
Ms. Dale Arguello [address] [facility] [id number] To Whom It May Concern: 
 
Dale Arguello is currently under the care of Diana Carpio. 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 If there are questions or concerns please have the patient contact our office. Sincerely, Nain Bey MD

## 2020-09-29 NOTE — PROGRESS NOTES
Room #  Virtual Doxy    Chief Complaint:    Letter  Hypertension   Diabetes  Stomach pain     HPI:    Wayne Garcia is a 61 y.o. female who presents today for c/o Letter Hypertension and diabetes and Stomach pain    1. Have you been to the ER, urgent care clinic since your last visit? Hospitalized since your last visit? Yes When:    2. Have you seen or consulted any other health care providers outside of the [address] [place] [address] since your last visit? Include any pap smears or colon screening. NO  When :  Reason:    Last  Checked na  Last UDS Checked na  Last Pain contract signed: na    Consent:  She and/or health care decision maker is aware that that she may receive a bill for this telephone service, depending on her insurance coverage, and has provided verbal consent to proceed: Yes      Health Maintenance reviewed Yes    Health Maintenance Due   Topic Date Due    DTaP/Tdap/Td series (1 - Tdap) 02/04/1981    PAP AKA CERVICAL CYTOLOGY  02/04/1981    Shingrix Vaccine Age 50> (1 of 2) 02/04/2010    Eye Exam Retinal or Dilated  05/29/2019    Flu Vaccine (1) 09/01/2020     Depression Screening:  3 most recent PHQ Screens 1/29/2020   Little interest or pleasure in doing things Not at all   Feeling down, depressed, irritable, or hopeless Not at all   Total Score PHQ 2 0     Learning Assessment:  Learning Assessment 7/22/2019   PRIMARY LEARNER Patient   HIGHEST LEVEL OF EDUCATION - PRIMARY LEARNER  -   BARRIERS PRIMARY LEARNER NONE   CO-LEARNER CAREGIVER No   PRIMARY LANGUAGE ENGLISH   LEARNER PREFERENCE PRIMARY READING   ANSWERED BY Patient   RELATIONSHIP SELF     Abuse Screening:  Abuse Screening Questionnaire 7/22/2019   Do you ever feel afraid of your partner? N   Are you in a relationship with someone who physically or mentally threatens you? N   Is it safe for you to go home? Y     Fall Risk  Fall Risk Assessment, last 12 mths 7/22/2019   Able to walk? Yes   Fall in past 12 months?  No

## 2020-10-02 ENCOUNTER — CLINICAL SUPPORT (OUTPATIENT)
Dept: FAMILY MEDICINE CLINIC | Age: 60
End: 2020-10-02
Payer: COMMERCIAL

## 2020-10-02 VITALS
RESPIRATION RATE: 20 BRPM | DIASTOLIC BLOOD PRESSURE: 90 MMHG | WEIGHT: 147.8 LBS | TEMPERATURE: 98.5 F | SYSTOLIC BLOOD PRESSURE: 145 MMHG | HEART RATE: 83 BPM | BODY MASS INDEX: 25.23 KG/M2 | OXYGEN SATURATION: 100 % | HEIGHT: 64 IN

## 2020-10-02 DIAGNOSIS — Z23 ENCOUNTER FOR IMMUNIZATION: Primary | ICD-10-CM

## 2020-10-02 PROCEDURE — 90471 IMMUNIZATION ADMIN: CPT | Performed by: FAMILY MEDICINE

## 2020-10-02 PROCEDURE — 90686 IIV4 VACC NO PRSV 0.5 ML IM: CPT | Performed by: FAMILY MEDICINE

## 2020-10-02 NOTE — PROGRESS NOTES
Room #  Lab    Chief Complaint:  Immunization    HPI:    Juanita Donaldson is a 61 y.o. female who presents today for c/o Flu shot    1. Have you been to the ER, urgent care clinic since your last visit? Hospitalized since your last visit? NO When:    2. Have you seen or consulted any other health care providers outside of the [address] [place] [address] since your last visit? Include any pap smears or colon screening. NO  When :  Reason:    Last  Checked na  Last UDS Checked na  Last Pain contract signed: na    Consent:  She and/or health care decision maker is aware that that she may receive a bill for this telephone service, depending on her insurance coverage, and has provided verbal consent to proceed: NA - Consent obtained within past 12 months      Health Maintenance reviewed Yes    Health Maintenance Due   Topic Date Due    DTaP/Tdap/Td series (1 - Tdap) 02/04/1981    PAP AKA CERVICAL CYTOLOGY  02/04/1981    Shingrix Vaccine Age 50> (1 of 2) 02/04/2010    Eye Exam Retinal or Dilated  05/29/2019    Flu Vaccine (1) 09/01/2020     Depression Screening:  3 most recent PHQ Screens 10/2/2020   Little interest or pleasure in doing things Not at all   Feeling down, depressed, irritable, or hopeless Not at all   Total Score PHQ 2 0     Learning Assessment:  Learning Assessment 7/22/2019   PRIMARY LEARNER Patient   HIGHEST LEVEL OF EDUCATION - PRIMARY LEARNER  -   BARRIERS PRIMARY LEARNER NONE   CO-LEARNER CAREGIVER No   PRIMARY LANGUAGE ENGLISH   LEARNER PREFERENCE PRIMARY READING   ANSWERED BY Patient   RELATIONSHIP SELF     Abuse Screening:  Abuse Screening Questionnaire 10/2/2020   Do you ever feel afraid of your partner? N   Are you in a relationship with someone who physically or mentally threatens you? N   Is it safe for you to go home? Y     Fall Risk  Fall Risk Assessment, last 12 mths 9/29/2020   Able to walk? Yes   Fall in past 12 months? Yes   Fall with injury?  No   Number of falls in past 12 months 1   Fall Risk Score 1

## 2020-10-14 ENCOUNTER — OFFICE VISIT (OUTPATIENT)
Dept: FAMILY MEDICINE CLINIC | Age: 60
End: 2020-10-14

## 2020-10-14 VITALS — HEIGHT: 64 IN | BODY MASS INDEX: 25.37 KG/M2

## 2020-10-15 ENCOUNTER — APPOINTMENT (OUTPATIENT)
Dept: FAMILY MEDICINE CLINIC | Age: 60
End: 2020-10-15

## 2020-10-15 DIAGNOSIS — E11.65 TYPE 2 DIABETES MELLITUS WITH HYPERGLYCEMIA, WITHOUT LONG-TERM CURRENT USE OF INSULIN (HCC): Primary | ICD-10-CM

## 2020-10-15 DIAGNOSIS — E11.65 TYPE 2 DIABETES MELLITUS WITH HYPERGLYCEMIA, WITHOUT LONG-TERM CURRENT USE OF INSULIN (HCC): ICD-10-CM

## 2020-10-21 ENCOUNTER — APPOINTMENT (OUTPATIENT)
Dept: CT IMAGING | Age: 60
DRG: 390 | End: 2020-10-21
Attending: EMERGENCY MEDICINE
Payer: COMMERCIAL

## 2020-10-21 ENCOUNTER — HOSPITAL ENCOUNTER (INPATIENT)
Age: 60
LOS: 2 days | Discharge: HOME | DRG: 390 | End: 2020-10-23
Attending: EMERGENCY MEDICINE | Admitting: INTERNAL MEDICINE
Payer: COMMERCIAL

## 2020-10-21 DIAGNOSIS — K56.600 PARTIAL SMALL BOWEL OBSTRUCTION (HCC): Primary | ICD-10-CM

## 2020-10-21 PROBLEM — R10.9 ABDOMINAL PAIN: Status: ACTIVE | Noted: 2020-10-21

## 2020-10-21 LAB
ALBUMIN SERPL-MCNC: 3.7 G/DL (ref 3.4–5)
ALBUMIN/GLOB SERPL: 0.9 {RATIO} (ref 0.8–1.7)
ALP SERPL-CCNC: 128 U/L (ref 45–117)
ALT SERPL-CCNC: 38 U/L (ref 13–56)
ANION GAP SERPL CALC-SCNC: 7 MMOL/L (ref 3–18)
APPEARANCE UR: CLEAR
AST SERPL-CCNC: 28 U/L (ref 10–38)
BASOPHILS # BLD: 0 K/UL (ref 0–0.1)
BASOPHILS NFR BLD: 0 % (ref 0–2)
BILIRUB SERPL-MCNC: 0.3 MG/DL (ref 0.2–1)
BILIRUB UR QL: NEGATIVE
BUN SERPL-MCNC: 13 MG/DL (ref 7–18)
BUN/CREAT SERPL: 11 (ref 12–20)
CALCIUM SERPL-MCNC: 9.4 MG/DL (ref 8.5–10.1)
CHLORIDE SERPL-SCNC: 102 MMOL/L (ref 100–111)
CO2 SERPL-SCNC: 28 MMOL/L (ref 21–32)
COLOR UR: YELLOW
CREAT SERPL-MCNC: 1.15 MG/DL (ref 0.6–1.3)
DIFFERENTIAL METHOD BLD: ABNORMAL
EOSINOPHIL # BLD: 0.5 K/UL (ref 0–0.4)
EOSINOPHIL NFR BLD: 6 % (ref 0–5)
ERYTHROCYTE [DISTWIDTH] IN BLOOD BY AUTOMATED COUNT: 14.9 % (ref 11.6–14.5)
GLOBULIN SER CALC-MCNC: 4.3 G/DL (ref 2–4)
GLUCOSE BLD STRIP.AUTO-MCNC: 115 MG/DL (ref 70–110)
GLUCOSE BLD STRIP.AUTO-MCNC: 242 MG/DL (ref 70–110)
GLUCOSE SERPL-MCNC: 104 MG/DL (ref 74–99)
GLUCOSE UR STRIP.AUTO-MCNC: >1000 MG/DL
HCT VFR BLD AUTO: 42.8 % (ref 35–45)
HGB BLD-MCNC: 13.6 G/DL (ref 12–16)
HGB UR QL STRIP: NEGATIVE
KETONES UR QL STRIP.AUTO: NEGATIVE MG/DL
LEUKOCYTE ESTERASE UR QL STRIP.AUTO: NEGATIVE
LYMPHOCYTES # BLD: 1.8 K/UL (ref 0.9–3.6)
LYMPHOCYTES NFR BLD: 25 % (ref 21–52)
MCH RBC QN AUTO: 26.7 PG (ref 24–34)
MCHC RBC AUTO-ENTMCNC: 31.8 G/DL (ref 31–37)
MCV RBC AUTO: 84.1 FL (ref 74–97)
MONOCYTES # BLD: 0.6 K/UL (ref 0.05–1.2)
MONOCYTES NFR BLD: 8 % (ref 3–10)
NEUTS SEG # BLD: 4.5 K/UL (ref 1.8–8)
NEUTS SEG NFR BLD: 61 % (ref 40–73)
NITRITE UR QL STRIP.AUTO: NEGATIVE
PH UR STRIP: 5 [PH] (ref 5–8)
PLATELET # BLD AUTO: 343 K/UL (ref 135–420)
PMV BLD AUTO: 10.3 FL (ref 9.2–11.8)
POTASSIUM SERPL-SCNC: 4.2 MMOL/L (ref 3.5–5.5)
PROT SERPL-MCNC: 8 G/DL (ref 6.4–8.2)
PROT UR STRIP-MCNC: NEGATIVE MG/DL
RBC # BLD AUTO: 5.09 M/UL (ref 4.2–5.3)
SODIUM SERPL-SCNC: 137 MMOL/L (ref 136–145)
SP GR UR REFRACTOMETRY: 1.02 (ref 1–1.03)
UROBILINOGEN UR QL STRIP.AUTO: 0.2 EU/DL (ref 0.2–1)
WBC # BLD AUTO: 7.4 K/UL (ref 4.6–13.2)

## 2020-10-21 PROCEDURE — 81003 URINALYSIS AUTO W/O SCOPE: CPT

## 2020-10-21 PROCEDURE — 74011250636 HC RX REV CODE- 250/636: Performed by: EMERGENCY MEDICINE

## 2020-10-21 PROCEDURE — 99285 EMERGENCY DEPT VISIT HI MDM: CPT

## 2020-10-21 PROCEDURE — 74177 CT ABD & PELVIS W/CONTRAST: CPT

## 2020-10-21 PROCEDURE — 82962 GLUCOSE BLOOD TEST: CPT

## 2020-10-21 PROCEDURE — 96375 TX/PRO/DX INJ NEW DRUG ADDON: CPT

## 2020-10-21 PROCEDURE — 96376 TX/PRO/DX INJ SAME DRUG ADON: CPT

## 2020-10-21 PROCEDURE — 74011000636 HC RX REV CODE- 636: Performed by: EMERGENCY MEDICINE

## 2020-10-21 PROCEDURE — 85025 COMPLETE CBC W/AUTO DIFF WBC: CPT

## 2020-10-21 PROCEDURE — 74011250636 HC RX REV CODE- 250/636: Performed by: INTERNAL MEDICINE

## 2020-10-21 PROCEDURE — 96374 THER/PROPH/DIAG INJ IV PUSH: CPT

## 2020-10-21 PROCEDURE — 65270000029 HC RM PRIVATE

## 2020-10-21 PROCEDURE — 74011250637 HC RX REV CODE- 250/637: Performed by: HOSPITALIST

## 2020-10-21 PROCEDURE — 99223 1ST HOSP IP/OBS HIGH 75: CPT | Performed by: SURGERY

## 2020-10-21 PROCEDURE — 74011250637 HC RX REV CODE- 250/637: Performed by: INTERNAL MEDICINE

## 2020-10-21 PROCEDURE — 80053 COMPREHEN METABOLIC PANEL: CPT

## 2020-10-21 RX ORDER — ONDANSETRON 2 MG/ML
4 INJECTION INTRAMUSCULAR; INTRAVENOUS
Status: COMPLETED | OUTPATIENT
Start: 2020-10-21 | End: 2020-10-21

## 2020-10-21 RX ORDER — ONDANSETRON 4 MG/1
4 TABLET, ORALLY DISINTEGRATING ORAL
Status: DISCONTINUED | OUTPATIENT
Start: 2020-10-21 | End: 2020-10-21

## 2020-10-21 RX ORDER — MORPHINE SULFATE 4 MG/ML
4 INJECTION, SOLUTION INTRAMUSCULAR; INTRAVENOUS
Status: COMPLETED | OUTPATIENT
Start: 2020-10-21 | End: 2020-10-21

## 2020-10-21 RX ORDER — PROMETHAZINE HYDROCHLORIDE 25 MG/1
12.5 TABLET ORAL
Status: DISCONTINUED | OUTPATIENT
Start: 2020-10-21 | End: 2020-10-23 | Stop reason: HOSPADM

## 2020-10-21 RX ORDER — INSULIN LISPRO 100 [IU]/ML
INJECTION, SOLUTION INTRAVENOUS; SUBCUTANEOUS
Status: DISCONTINUED | OUTPATIENT
Start: 2020-10-21 | End: 2020-10-23 | Stop reason: HOSPADM

## 2020-10-21 RX ORDER — ALBUTEROL SULFATE 0.83 MG/ML
2.5 SOLUTION RESPIRATORY (INHALATION)
Status: DISCONTINUED | OUTPATIENT
Start: 2020-10-21 | End: 2020-10-21 | Stop reason: SDUPTHER

## 2020-10-21 RX ORDER — NALOXONE HYDROCHLORIDE 0.4 MG/ML
0.4 INJECTION, SOLUTION INTRAMUSCULAR; INTRAVENOUS; SUBCUTANEOUS AS NEEDED
Status: DISCONTINUED | OUTPATIENT
Start: 2020-10-21 | End: 2020-10-23 | Stop reason: HOSPADM

## 2020-10-21 RX ORDER — ENOXAPARIN SODIUM 100 MG/ML
40 INJECTION SUBCUTANEOUS DAILY
Status: DISCONTINUED | OUTPATIENT
Start: 2020-10-22 | End: 2020-10-23 | Stop reason: HOSPADM

## 2020-10-21 RX ORDER — AMLODIPINE BESYLATE 5 MG/1
5 TABLET ORAL DAILY
Status: DISCONTINUED | OUTPATIENT
Start: 2020-10-22 | End: 2020-10-21

## 2020-10-21 RX ORDER — SODIUM CHLORIDE 0.9 % (FLUSH) 0.9 %
5-40 SYRINGE (ML) INJECTION EVERY 8 HOURS
Status: DISCONTINUED | OUTPATIENT
Start: 2020-10-21 | End: 2020-10-23 | Stop reason: HOSPADM

## 2020-10-21 RX ORDER — BISOPROLOL FUMARATE AND HYDROCHLOROTHIAZIDE 2.5; 6.25 MG/1; MG/1
1 TABLET ORAL DAILY
Status: DISCONTINUED | OUTPATIENT
Start: 2020-10-22 | End: 2020-10-23 | Stop reason: HOSPADM

## 2020-10-21 RX ORDER — SODIUM CHLORIDE 9 MG/ML
70 INJECTION, SOLUTION INTRAVENOUS CONTINUOUS
Status: DISCONTINUED | OUTPATIENT
Start: 2020-10-21 | End: 2020-10-23 | Stop reason: HOSPADM

## 2020-10-21 RX ORDER — HYDROMORPHONE HYDROCHLORIDE 1 MG/ML
0.5 INJECTION, SOLUTION INTRAMUSCULAR; INTRAVENOUS; SUBCUTANEOUS
Status: DISCONTINUED | OUTPATIENT
Start: 2020-10-21 | End: 2020-10-23 | Stop reason: HOSPADM

## 2020-10-21 RX ORDER — IPRATROPIUM BROMIDE AND ALBUTEROL SULFATE 2.5; .5 MG/3ML; MG/3ML
3 SOLUTION RESPIRATORY (INHALATION)
Status: DISCONTINUED | OUTPATIENT
Start: 2020-10-21 | End: 2020-10-23 | Stop reason: HOSPADM

## 2020-10-21 RX ORDER — DIPHENHYDRAMINE HCL 25 MG
25 CAPSULE ORAL ONCE
Status: COMPLETED | OUTPATIENT
Start: 2020-10-21 | End: 2020-10-21

## 2020-10-21 RX ORDER — INSULIN GLARGINE 100 [IU]/ML
0.2 INJECTION, SOLUTION SUBCUTANEOUS
Status: DISCONTINUED | OUTPATIENT
Start: 2020-10-21 | End: 2020-10-23 | Stop reason: HOSPADM

## 2020-10-21 RX ORDER — ONDANSETRON 2 MG/ML
4 INJECTION INTRAMUSCULAR; INTRAVENOUS
Status: DISCONTINUED | OUTPATIENT
Start: 2020-10-21 | End: 2020-10-23 | Stop reason: HOSPADM

## 2020-10-21 RX ORDER — MAGNESIUM SULFATE 100 %
4 CRYSTALS MISCELLANEOUS AS NEEDED
Status: DISCONTINUED | OUTPATIENT
Start: 2020-10-21 | End: 2020-10-23 | Stop reason: HOSPADM

## 2020-10-21 RX ORDER — ACETAMINOPHEN 325 MG/1
650 TABLET ORAL
Status: DISCONTINUED | OUTPATIENT
Start: 2020-10-21 | End: 2020-10-23 | Stop reason: HOSPADM

## 2020-10-21 RX ORDER — DEXTROSE MONOHYDRATE 25 G/50ML
25-50 INJECTION, SOLUTION INTRAVENOUS AS NEEDED
Status: DISCONTINUED | OUTPATIENT
Start: 2020-10-21 | End: 2020-10-23 | Stop reason: HOSPADM

## 2020-10-21 RX ORDER — SODIUM CHLORIDE 0.9 % (FLUSH) 0.9 %
5-40 SYRINGE (ML) INJECTION AS NEEDED
Status: DISCONTINUED | OUTPATIENT
Start: 2020-10-21 | End: 2020-10-23 | Stop reason: HOSPADM

## 2020-10-21 RX ORDER — ACETAMINOPHEN 650 MG/1
650 SUPPOSITORY RECTAL
Status: DISCONTINUED | OUTPATIENT
Start: 2020-10-21 | End: 2020-10-23 | Stop reason: HOSPADM

## 2020-10-21 RX ORDER — POLYETHYLENE GLYCOL 3350 17 G/17G
17 POWDER, FOR SOLUTION ORAL DAILY PRN
Status: DISCONTINUED | OUTPATIENT
Start: 2020-10-21 | End: 2020-10-23 | Stop reason: HOSPADM

## 2020-10-21 RX ADMIN — PROMETHAZINE HYDROCHLORIDE 12.5 MG: 25 TABLET ORAL at 21:42

## 2020-10-21 RX ADMIN — IOPAMIDOL 98 ML: 612 INJECTION, SOLUTION INTRAVENOUS at 14:16

## 2020-10-21 RX ADMIN — Medication 10 ML: at 21:19

## 2020-10-21 RX ADMIN — SODIUM CHLORIDE 1000 ML: 900 INJECTION, SOLUTION INTRAVENOUS at 12:59

## 2020-10-21 RX ADMIN — MORPHINE SULFATE 4 MG: 4 INJECTION, SOLUTION INTRAMUSCULAR; INTRAVENOUS at 16:17

## 2020-10-21 RX ADMIN — DIPHENHYDRAMINE HYDROCHLORIDE 25 MG: 25 CAPSULE ORAL at 21:42

## 2020-10-21 RX ADMIN — MORPHINE SULFATE 4 MG: 4 INJECTION, SOLUTION INTRAMUSCULAR; INTRAVENOUS at 13:02

## 2020-10-21 RX ADMIN — ONDANSETRON 4 MG: 2 INJECTION INTRAMUSCULAR; INTRAVENOUS at 13:02

## 2020-10-21 RX ADMIN — SODIUM CHLORIDE 125 ML/HR: 900 INJECTION, SOLUTION INTRAVENOUS at 17:00

## 2020-10-21 RX ADMIN — HYDROMORPHONE HYDROCHLORIDE 0.5 MG: 1 INJECTION, SOLUTION INTRAMUSCULAR; INTRAVENOUS; SUBCUTANEOUS at 20:32

## 2020-10-21 NOTE — ED NOTES
TRANSFER - ED to INPATIENT REPORT:    Verbal report given to RN(name) on Amy Gann  being transferred to 2221(unit) for routine progression of care       Report consisted of patients Situation, Background, Assessment and   Recommendations(SBAR). SBAR report made available to receiving floor on this patient being transferred to [address] [place] [address] (2200)  for routine progression of care       Admitting diagnosis Abdominal pain [R10.9]    Information from the following report(s) SBAR was made available to receiving floor. Lines:   Peripheral IV 10/21/20 Right Wrist (Active)   Site Assessment Clean, dry, & intact 10/21/20 1202   Phlebitis Assessment 0 10/21/20 1202   Infiltration Assessment 0 10/21/20 1202   Dressing Status Clean, dry, & intact 10/21/20 1202   Hub Color/Line Status Green 10/21/20 1202       Peripheral IV 10/21/20 Left Forearm (Active)   Site Assessment Clean, dry, & intact 10/21/20 1820   Phlebitis Assessment 0 10/21/20 1820   Infiltration Assessment 0 10/21/20 1820        HCG Status for ALL Females under 55 y/o: NO       Opportunity for questions and clarification was provided.       MAP (Monitor): 74 =Monitored (most recent)  Vitals w/ MEWS Score (last day)     Date/Time MEWS Score Pulse Resp Temp BP Level of Consciousness SpO2    10/21/20 1930  1  74  16  98.3 °F (36.8 °C)  101/69  Alert  98 %    10/21/20 1915          125/71    97 %    10/21/20 1900          122/78    97 %    10/21/20 1845          124/77    98 %    10/21/20 1830          127/75    98 %    10/21/20 1715          135/81    97 %    10/21/20 1700          131/79    96 %    10/21/20 1645          (!) 166/76    99 %    10/21/20 1630          125/74    98 %    10/21/20 1330          109/62    99 %    10/21/20 1315          112/62    99 %    10/21/20 1124  1  72  18  98.3 °F (36.8 °C)  122/89  Alert  99 %

## 2020-10-21 NOTE — ED TRIAGE NOTES
Patient co RLQ abdominal pain. Patient had bowel obstruction 2 months ago. Able to eat, drink, and having normal bowel movements.

## 2020-10-21 NOTE — CONSULTS
General Surgery Consult      [facility]  Admit date: 10/21/2020    MRN: [id number]     : 1960     Age: 61 y.o. Attending Physician: Robyn Younger MD, FACS      Subjective:     Pat Baird is a 61 y.o. female who is very well-known to me. The patient has a very long history of multiple abdominal surgeries and the severe really bad adhesions for which last surgery was performed by me and the GYN team for attempt to remove a left adnexal mass that we were not able to remove because of the severity of the adhesions and this ended up with a fistula that has healed spontaneously. The patient has been doing relatively well and she stated that she did not have pain for months now, but yesterday she was having a bowel movement and all of a sudden she had a sudden onset of severe crampy abdominal pain. Because of her previous history she was very afraid so she came to the emergency room. She states she had some nausea but no vomiting and the notes from the previous team stated that she had some vomiting. She stated that now when I saw her in the emergency room she is feeling great and she has 0 abdominal pain and she feels completely normal.  She has been passing flatus and had normal bowel movement today as well. Her vitals signs are normal with no fever or tachycardia and she has no leukocytosis. Her CT scan showed a few borderline prominent fluid filled bowel loops are seen within the inferior pelvis adjacent to bowel anastomosis staple line. Findings are suggestive of adhesions and focal ileus. No evidence of obstruction in this vicinity. A few small bowel loops also appear partially adhesed to the anterior abdominal wall near the midline ventral incision. A few of these bowel loops  have mild bowel wall thickening, suggestive of enteritis. Currently there is similarly no evidence of complete bowel obstruction in this vicinity.   She was admitted to the medicine service for further observation and evaluation.      Patient Active Problem List    Diagnosis Date Noted    Abdominal pain 10/21/2020    Small bowel obstruction ([address] 09/11/2020    Anemia 08/10/2019    S/P small bowel resection 08/01/2019    Adnexal cyst 07/30/2019    Chronic pelvic pain in female 07/30/2019    Bowel obstruction (HCC) 08/23/2018    Non-intractable cyclical vomiting with nausea 01/24/2018    Type 2 diabetes mellitus with nephropathy ([address] 01/19/2018    Chronic abdominal pain 03/14/2017    Post-operative pain 03/14/2017    SBO (small bowel obstruction) (HCC) 02/21/2017    Osteoarthritis of left knee 06/27/2016    Hypertension 06/27/2016    Hyperlipidemia 06/27/2016    GERD (gastroesophageal reflux disease) 06/27/2016    Asthma 06/27/2016    Type 2 diabetes mellitus ([address] 06/27/2016    Sural neuritis 06/23/2014    Chest pain 04/20/2014    Essential hypertension, benign 02/27/2012    Generalized abdominal pain      Past Medical History:   Diagnosis Date    Abdominal adhesions     Adnexal cyst 7/30/2019    Left    Anemia     Asthma     Chronic pelvic pain in female 7/30/2019    Diabetes mellitus     Dyskinesia     bilateral    Essential hypertension     GERD (gastroesophageal reflux disease)     Hypertension     Menopause     Microhematuria     Rheumatoid arteritis ([address] 2018    Stool color black       Past Surgical History:   Procedure Laterality Date    COLONOSCOPY N/A 1/21/2019    COLON[facility]COPY performed [facility]se [facility] at [facility] ENDOSCOPY    HX CHOLECYSTECTOMY  6/28/10    HX COLONOSCOPY      HX ENDOSCOPY      HX HERNIA REPAIR      HX HYSTERECTOMY  1989    Fibroids    HX KNEE REPLACEMENT Left     HX KNEE REPLACEMENT Right 06/2018    HX OOPHORECTOMY  12/2000    HX ORTHOPAEDIC Right     ankle- multiple surgeries    HX SMALL BOWEL RESECTION  12/2000    HX SMALL BOWEL RESECTION  02/21/2017    Dr. Mara Plascencia History     Tobacco Use    Smoking status: Never Smoker    Smokeless tobacco: Never Used   Substance Use Topics    Alcohol use: No      Social History     Tobacco Use   Smoking Status Never Smoker   Smokeless Tobacco Never Used     Family History   Problem Relation Age of Onset    Hypertension Other         parent    Breast Cancer Other 21    Heart Disease Father     Diabetes Father     Lung Disease Father     Diabetes Mother     Hypertension Other         sibling    Diabetes Other         parent    Kidney Disease Brother     Diabetes Brother     Lung Disease Brother       Current Facility-Administered Medications   Medication Dose Route Frequency    0.9% sodium chloride infusion  125 mL/hr IntraVENous CONTINUOUS     Current Outpatient Medications   Medication Sig    omeprazole (PRILOSEC) 10 mg capsule take 1 capsule by mouth once daily    dapagliflozin (FARXIGA) 10 mg tab tablet Take 10 mg by mouth Every morning.  metFORMIN ER (GLUCOPHAGE XR) 500 mg tablet take 2 tablets by mouth twice a day    ondansetron (Zofran ODT) 4 mg disintegrating tablet Take 1 Tab by mouth every eight (8) hours as needed for Nausea or Vomiting.  bisoprolol-hydroCHLOROthiazide (ZIAC) 2.5-6.25 mg per tablet take 1 tablet by mouth once daily    doxepin (SINEquan) 25 mg capsule Take 1 Cap by mouth nightly.  acetaminophen (TYLENOL) 325 mg tablet Take 2 Tabs by mouth every four (4) hours as needed for Pain.  amLODIPine (NORVASC) 5 mg tablet Take 1 Tab by mouth daily.  albuterol sulfate (PROAIR RESPICLICK) 90 mcg/actuation aepb Take 2 Puffs by inhalation every four (4) hours as needed for Wheezing, Shortness of Breath or Cough.  albuterol (PROVENTIL VENTOLIN) 2.5 mg /3 mL (0.083 %) nebu 3 mL by Nebulization route every six (6) hours as needed for Wheezing.  multivitamin (ONE A DAY) tablet Take 1 Tab by mouth daily.     glucose blood VI test strips (ONETOUCH ULTRA TEST) strip Check blood glucose as directed      Allergies Allergen Reactions    Macrodantin [Nitrofurantoin Macrocrystalline] Itching and Other (comments)    Tape [Adhesive] Other (comments)     Paper tape-- feels like burning skin  Burned skin when had wound vac        Review of Systems:  Constitutional: negative  Eyes: negative  Ears, Nose, Mouth, Throat, and Face: negative  Respiratory: negative  Cardiovascular: negative  Gastrointestinal: positive for nausea, vomiting and abdominal pain  Genitourinary:negative  Integument/Breast: negative  Hematologic/Lymphatic: negative  Musculoskeletal:negative  Neurological: negative  Behavioral/Psychiatric: negative  Endocrine: negative  Allergic/Immunologic: negative      Objective:     Visit Vitals  /81   Pulse 72   Temp 98.3 °F (36.8 °C)   Resp 18   Ht 5' 4\" (1.626 m)   Wt 65.8 kg (145 lb)   SpO2 97%   BMI 24.89 kg/m²       Physical Exam:      General:  in no apparent distress, non-toxic, alert, oriented times 3, afebrile, normal vitals and cooperative   Eyes:  conjunctivae and sclerae normal, pupils equal, round, reactive to light   Throat & Neck: no erythema or exudates noted and neck supple and symmetrical; no palpable masses   Lungs:   clear to auscultation bilaterally   Heart:  Regular rate and rhythm   Abdomen:   rounded, soft, nontender, nondistended, no masses or organomegaly. No evidence of abdominal wall hernias.    Extremities: extremities normal, atraumatic, no cyanosis or edema   Skin: Normal.         Imaging and Lab Review:     CBC:   Lab Results   Component Value Date/Time    WBC 7.4 10/21/2020 12:01 PM    RBC 5.09 10/21/2020 12:01 PM    HGB 13.6 10/21/2020 12:01 PM    HCT 42.8 10/21/2020 12:01 PM    PLATELET 472 56/00/5541 12:01 PM     BMP:   Lab Results   Component Value Date/Time    Glucose 104 (H) 10/21/2020 12:01 PM    Sodium 137 10/21/2020 12:01 PM    Potassium 4.2 10/21/2020 12:01 PM    Chloride 102 10/21/2020 12:01 PM    CO2 28 10/21/2020 12:01 PM    BUN 13 10/21/2020 12:01 PM    Creatinine 1.15 10/21/2020 12:01 PM    Calcium 9.4 10/21/2020 12:01 PM     CMP:  Lab Results   Component Value Date/Time    Glucose 104 (H) 10/21/2020 12:01 PM    Sodium 137 10/21/2020 12:01 PM    Potassium 4.2 10/21/2020 12:01 PM    Chloride 102 10/21/2020 12:01 PM    CO2 28 10/21/2020 12:01 PM    BUN 13 10/21/2020 12:01 PM    Creatinine 1.15 10/21/2020 12:01 PM    Calcium 9.4 10/21/2020 12:01 PM    Anion gap 7 10/21/2020 12:01 PM    BUN/Creatinine ratio 11 (L) 10/21/2020 12:01 PM    Alk. phosphatase 128 (H) 10/21/2020 12:01 PM    Protein, total 8.0 10/21/2020 12:01 PM    Albumin 3.7 10/21/2020 12:01 PM    Globulin 4.3 (H) 10/21/2020 12:01 PM    A-G Ratio 0.9 10/21/2020 12:01 PM       Recent Results (from the past 24 hour(s))   URINALYSIS W/ RFLX MICROSCOPIC    Collection Time: 10/21/20 11:27 AM   Result Value Ref Range    Color YELLOW      Appearance CLEAR      Specific gravity 1.018 1.005 - 1.030      pH (UA) 5.0 5.0 - 8.0      Protein Negative NEG mg/dL    Glucose >1,000 (A) NEG mg/dL    Ketone Negative NEG mg/dL    Bilirubin Negative NEG      Blood Negative NEG      Urobilinogen 0.2 0.2 - 1.0 EU/dL    Nitrites Negative NEG      Leukocyte Esterase Negative NEG     CBC WITH AUTOMATED DIFF    Collection Time: 10/21/20 12:01 PM   Result Value Ref Range    WBC 7.4 4.6 - 13.2 K/uL    RBC 5.09 4. 20 - 5.30 M/uL    HGB 13.6 12.0 - 16.0 g/dL    HCT 42.8 35.0 - 45.0 %    MCV 84.1 74.0 - 97.0 FL    MCH 26.7 24.0 - 34.0 PG    MCHC 31.8 31.0 - 37.0 g/dL    RDW 14.9 (H) 11.6 - 14.5 %    PLATELET 602 767 - 735 K/uL    MPV 10.3 9.2 - 11.8 FL    NEUTROPHILS 61 40 - 73 %    LYMPHOCYTES 25 21 - 52 %    MONOCYTES 8 3 - 10 %    EOSINOPHILS 6 (H) 0 - 5 %    BASOPHILS 0 0 - 2 %    ABS. NEUTROPHILS 4.5 1.8 - 8.0 K/UL    ABS. LYMPHOCYTES 1.8 0.9 - 3.6 K/UL    ABS. MONOCYTES 0.6 0.05 - 1.2 K/UL    ABS. EOSINOPHILS 0.5 (H) 0.0 - 0.4 K/UL    ABS.  BASOPHILS 0.0 0.0 - 0.1 K/UL    DF AUTOMATED     METABOLIC PANEL, COMPREHENSIVE    Collection Time: 10/21/20 12:01 PM   Result Value Ref Range    Sodium 137 136 - 145 mmol/L    Potassium 4.2 3.5 - 5.5 mmol/L    Chloride 102 100 - 111 mmol/L    CO2 28 21 - 32 mmol/L    Anion gap 7 3.0 - 18 mmol/L    Glucose 104 (H) 74 - 99 mg/dL    BUN 13 7.0 - 18 MG/DL    Creatinine 1.15 0.6 - 1.3 MG/DL    BUN/Creatinine ratio 11 (L) 12 - 20      GFR est AA 58 (L) >60 ml/min/1.73m2    GFR est non-AA 48 (L) >60 ml/min/1.73m2    Calcium 9.4 8.5 - 10.1 MG/DL    Bilirubin, total 0.3 0.2 - 1.0 MG/DL    ALT (SGPT) 38 13 - 56 U/L    AST (SGOT) 28 10 - 38 U/L    Alk. phosphatase 128 (H) 45 - 117 U/L    Protein, total 8.0 6.4 - 8.2 g/dL    Albumin 3.7 3.4 - 5.0 g/dL    Globulin 4.3 (H) 2.0 - 4.0 g/dL    A-G Ratio 0.9 0.8 - 1.7         images and reports reviewed    Assessment:   Arthur Bean is a 61 y.o. female who is very well-known to me with a history of a almost frozen abdomen who has been doing well however all of a sudden she has been having abdominal pain associated with cramps and nausea. Because of concern of recurrent bowel obstruction she presented to the emergency room. She still in the ER currently because there is no beds however the patient feels great and back to normal and she is passing flatus and having normal bowel movement. I know the patient extremely well and I know that her abdomen is a disaster and even if she has bowel obstruction surgery should be the last resort. Anyway the patient has no evidence of full obstruction neither on exam nor on CT scan even and I believe that she can go home either tonight or tomorrow morning but because it is late now we will observe until tomorrow morning.      Plan:     I appreciate medicine admission and management  I placed her on clear liquid diet based on the physical exam and how she is feeling now  IV fluids  Hold on any NG tube placement for now  Plan for discharge home tomorrow if everything is fine    Please call me if you have any questions (cell phone: [phone])     Signed By: Liborio Felty, MD     October 21, 2020

## 2020-10-21 NOTE — ED PROVIDER NOTES
HPI   55-year-old -American female presents with a chief complaint of right lower quadrant abdominal pain x2 days. Patient has a history of partial small bowel obstruction secondary to adhesions. She currently rates the pain 10 out of 10 in intensity and states that it is sharp and aggravated with palpation and motion. Patient states that it feels like she has another partial small bowel obstruction. She states that she is able to eat and has not experienced any vomiting or constipation.   Past Medical History:   Diagnosis Date    Abdominal adhesions     Adnexal cyst 7/30/2019    Left    Anemia     Asthma     Chronic pelvic pain in female 7/30/2019    Diabetes mellitus     Dyskinesia     bilateral    Essential hypertension     GERD (gastroesophageal reflux disease)     Hypertension     Menopause     Microhematuria     Rheumatoid arteritis ([facility] [address] 2018    Stool color black        Past Surgical History:   Procedure Laterality Date    COLONOSCOPY N/A 1/21/2019    COLONOSCOPY performed by La Garvin MD at [facility] ENDOSCOPY    HX CHOLECYSTECTOMY  6/28/10    HX COLONOSCOPY      HX ENDOSCOPY      HX HERNIA REPAIR      HX HYSTERECTOMY  1989    Fibroids    HX KNEE REPLACEMENT Left     HX KNEE REPLACEMENT Right 06/2018    HX OOPHORECTOMY  12/2000    HX ORTHOPAEDIC Right     ankle- multiple surgeries    HX SMALL BOWEL RESECTION  12/2000    HX SMALL BOWEL RESECTION  02/21/2017    Dr. Thea Scott           Family History:   Problem Relation Age of Onset    Hypertension Other         parent    Breast Cancer Other 21    Heart Disease Father     Diabetes Father     Lung Disease Father     Diabetes Mother     Hypertension Other         sibling    Diabetes Other         parent    Kidney Disease Brother     Diabetes Brother     Lung Disease Brother        Social History     Socioeconomic History    Marital status:      Spouse name: Not on file    Number of children: Not on file    Years of education: Not on file    Highest education level: Not on file   Occupational History    Not on file   Social Needs    Financial resource strain: Not on file    Food insecurity     Worry: Not on file     Inability: Not on file    Transportation needs     Medical: Not on file     Non-medical: Not on file   Tobacco Use    Smoking status: Never Smoker    Smokeless tobacco: Never Used   Substance and Sexual Activity    Alcohol use: No    Drug use: No    Sexual activity: Yes     Partners: Male     Birth control/protection: None   Lifestyle    Physical activity     Days per week: Not on file     Minutes per session: Not on file    Stress: Not on file   Relationships    Social connections     Talks on phone: Not on file     Gets together: Not on file     Attends Hoahaoism service: Not on file     Active member of club or organization: Not on file     Attends meetings of clubs or organizations: Not on file     Relationship status: Not on file    Intimate partner violence     Fear of current or ex partner: Not on file     Emotionally abused: Not on file     Physically abused: Not on file     Forced sexual activity: Not on file   Other Topics Concern    Not on file   Social History Narrative    Not on file         ALLERGIES: Macrodantin [nitrofurantoin macrocrystalline] and Tape [adhesive]    Review of Systems   Constitutional: Negative for chills, diaphoresis, fatigue, fever and unexpected weight change. HENT: Negative for congestion, dental problem, ear discharge, ear pain, hearing loss, nosebleeds, postnasal drip, sinus pressure, sore throat and trouble swallowing. Eyes: Negative for photophobia, pain, discharge, redness and visual disturbance. Respiratory: Negative for cough, chest tightness, shortness of breath, wheezing and stridor. Cardiovascular: Negative for chest pain, palpitations and leg swelling.    Gastrointestinal: Positive for abdominal pain. Negative for abdominal distention, anal bleeding, blood in stool, constipation, diarrhea, nausea and vomiting. Endocrine: Negative for polydipsia and polyuria. Genitourinary: Negative for difficulty urinating, dysuria, flank pain, frequency, hematuria, pelvic pain, urgency, vaginal bleeding and vaginal discharge. Musculoskeletal: Negative for arthralgias, back pain, joint swelling, myalgias, neck pain and neck stiffness. Skin: Negative for color change, rash and wound. Neurological: Negative for dizziness, tremors, seizures, syncope, weakness, light-headedness, numbness and headaches. Hematological: Negative for adenopathy. Does not bruise/bleed easily. Psychiatric/Behavioral: Negative for agitation, confusion, decreased concentration, hallucinations, sleep disturbance and suicidal ideas. The patient is not nervous/anxious. All other systems reviewed and are negative. Vitals:    10/21/20 1124 10/21/20 1315 10/21/20 1330   BP: 122/89 112/62 109/62   Pulse: 72     Resp: 18     Temp: 98.3 °F (36.8 °C)     SpO2: 99% 99% 99%   Weight: 65.8 kg (145 lb)     Height: 5' 4\" (1.626 m)              Physical Exam  Vitals signs and nursing note reviewed. Constitutional:       General: She is in acute distress (moderate painful distress). Appearance: She is well-developed. She is not diaphoretic. HENT:      Head: Normocephalic and atraumatic. Right Ear: Tympanic membrane and external ear normal.      Left Ear: Tympanic membrane and external ear normal.      Nose: Nose normal.      Mouth/Throat:      Mouth: Mucous membranes are moist.      Pharynx: Oropharynx is clear. No oropharyngeal exudate. Eyes:      General: No scleral icterus. Right eye: No discharge. Left eye: No discharge. Conjunctiva/sclera: Conjunctivae normal.      Pupils: Pupils are equal, round, and reactive to light. Neck:      Musculoskeletal: Normal range of motion and neck supple.       Thyroid: No thyromegaly. Vascular: No JVD. Trachea: No tracheal deviation. Cardiovascular:      Rate and Rhythm: Normal rate and regular rhythm. Heart sounds: Normal heart sounds. No murmur. No friction rub. No gallop. Pulmonary:      Effort: Pulmonary effort is normal. No respiratory distress. Breath sounds: Normal breath sounds. No stridor. No wheezing or rales. Chest:      Chest wall: No tenderness. Abdominal:      General: Bowel sounds are normal. There is no distension. Palpations: Abdomen is soft. There is no mass. Tenderness: There is abdominal tenderness (RLQ abdominal pain). There is guarding. There is no rebound. Comments: Midline and right upper quadrant surgical scars noted   Genitourinary:     Vagina: Normal.   Musculoskeletal: Normal range of motion. General: No tenderness. Lymphadenopathy:      Cervical: No cervical adenopathy. Skin:     General: Skin is warm and dry. Coloration: Skin is not pale. Findings: No erythema or rash. Neurological:      Mental Status: She is alert and oriented to person, place, and time. Cranial Nerves: No cranial nerve deficit. Deep Tendon Reflexes: Reflexes are normal and symmetric.    Psychiatric:         Behavior: Behavior normal.         Judgment: Judgment normal.          MDM  Number of Diagnoses or Management Options  Diagnosis management comments: Differential diagnosis includes: Partial versus complete bowel obstruction, diverticulitis, chronic abdominal pain       Amount and/or Complexity of Data Reviewed  Clinical lab tests: reviewed and ordered  Tests in the radiology section of CPT®: ordered and reviewed  Tests in the medicine section of CPT®: reviewed and ordered  Review and summarize past medical records: yes  Independent visualization of images, tracings, or specimens: yes    Risk of Complications, Morbidity, and/or Mortality  Presenting problems: high  Diagnostic procedures: high  Management options: high    Patient Progress  Patient progress: stable         Procedures        Orders Placed This Encounter    CT ABD PELV W CONT     Standing Status:   Standing     Number of Occurrences:   1     Order Specific Question:   Transport     Answer:   Stretcher [5]     Order Specific Question:   Reason for Exam     Answer:   RLQ abdominal pain     Order Specific Question: This order utilizes IV contrast.  What additional contrast is needed? Answer:   None     Order Specific Question:   Does patient have history of Renal Disease? Answer:   No    CBC WITH AUTOMATED DIFF     Standing Status:   Standing     Number of Occurrences:   1    METABOLIC PANEL, COMPREHENSIVE     Standing Status:   Standing     Number of Occurrences:   1    URINALYSIS W/ RFLX MICROSCOPIC     Standing Status:   Standing     Number of Occurrences:   1    DIET NPO     Standing Status:   Standing     Number of Occurrences:   1    ABD PAIN BELOW PANEL TRACKING (DO NOT DESELECT)     Standing Status:   Standing     Number of Occurrences:   1    sodium chloride 0.9 % bolus infusion 1,000 mL    morphine injection 4 mg    ondansetron (ZOFRAN) injection 4 mg    iopamidoL (ISOVUE 300) 61 % contrast injection 100 mL    morphine injection 4 mg    0.9% sodium chloride infusion     Recent Results (from the past 12 hour(s))   URINALYSIS W/ RFLX MICROSCOPIC    Collection Time: 10/21/20 11:27 AM   Result Value Ref Range    Color YELLOW      Appearance CLEAR      Specific gravity 1.018 1.005 - 1.030      pH (UA) 5.0 5.0 - 8.0      Protein Negative NEG mg/dL    Glucose >1,000 (A) NEG mg/dL    Ketone Negative NEG mg/dL    Bilirubin Negative NEG      Blood Negative NEG      Urobilinogen 0.2 0.2 - 1.0 EU/dL    Nitrites Negative NEG      Leukocyte Esterase Negative NEG     CBC WITH AUTOMATED DIFF    Collection Time: 10/21/20 12:01 PM   Result Value Ref Range    WBC 7.4 4.6 - 13.2 K/uL    RBC 5.09 4. 20 - 5.30 M/uL    HGB 13.6 12.0 - 16.0 g/dL HCT 42.8 35.0 - 45.0 %    MCV 84.1 74.0 - 97.0 FL    MCH 26.7 24.0 - 34.0 PG    MCHC 31.8 31.0 - 37.0 g/dL    RDW 14.9 (H) 11.6 - 14.5 %    PLATELET 656 957 - 074 K/uL    MPV 10.3 9.2 - 11.8 FL    NEUTROPHILS 61 40 - 73 %    LYMPHOCYTES 25 21 - 52 %    MONOCYTES 8 3 - 10 %    EOSINOPHILS 6 (H) 0 - 5 %    BASOPHILS 0 0 - 2 %    ABS. NEUTROPHILS 4.5 1.8 - 8.0 K/UL    ABS. LYMPHOCYTES 1.8 0.9 - 3.6 K/UL    ABS. MONOCYTES 0.6 0.05 - 1.2 K/UL    ABS. EOSINOPHILS 0.5 (H) 0.0 - 0.4 K/UL    ABS. BASOPHILS 0.0 0.0 - 0.1 K/UL    DF AUTOMATED     METABOLIC PANEL, COMPREHENSIVE    Collection Time: 10/21/20 12:01 PM   Result Value Ref Range    Sodium 137 136 - 145 mmol/L    Potassium 4.2 3.5 - 5.5 mmol/L    Chloride 102 100 - 111 mmol/L    CO2 28 21 - 32 mmol/L    Anion gap 7 3.0 - 18 mmol/L    Glucose 104 (H) 74 - 99 mg/dL    BUN 13 7.0 - 18 MG/DL    Creatinine 1.15 0.6 - 1.3 MG/DL    BUN/Creatinine ratio 11 (L) 12 - 20      GFR est AA 58 (L) >60 ml/min/1.73m2    GFR est non-AA 48 (L) >60 ml/min/1.73m2    Calcium 9.4 8.5 - 10.1 MG/DL    Bilirubin, total 0.3 0.2 - 1.0 MG/DL    ALT (SGPT) 38 13 - 56 U/L    AST (SGOT) 28 10 - 38 U/L    Alk. phosphatase 128 (H) 45 - 117 U/L    Protein, total 8.0 6.4 - 8.2 g/dL    Albumin 3.7 3.4 - 5.0 g/dL    Globulin 4.3 (H) 2.0 - 4.0 g/dL    A-G Ratio 0.9 0.8 - 1.7       CT ABD PELV W CONT   Final Result   IMPRESSION:      1. A few borderline prominent fluid filled bowel loops are seen within the   inferior pelvis adjacent to bowel anastomosis staple line. Findings are   suggestive of adhesions and focal ileus. No evidence of obstruction in this   vicinity. 2. A few small bowel loops also appear partially adhesed to the anterior   abdominal wall near the midline ventral incision. A few of these bowel loops   have mild bowel wall thickening, suggestive of enteritis. Currently there is   similarly no evidence of complete bowel obstruction in this vicinity.    3. Unchanged cystic left adnexal lesion. 4:18 PM   Wilmer Martin DO discussed care with Rosalie Gregorio (General Surgery). Standard discussion; including history of patients chief complaint, available diagnostic results, and treatment course. He request admission to hospitalist service and will see the patient in consultation. He request that the patient be made NPO and started on IV hydration normal saline. 4:45 PM   Wilmer Martin DO discussed care with Dr. Andriy Maciel [facility]). Standard discussion; including history of patients chief complaint, available diagnostic results, and treatment course. He agrees with plans for admission and will see the patient in consultation. Diagnosis:   1. Partial small bowel obstruction (HCC)          Disposition: Admitted    Follow-up Information    None         Patient's Medications   Start Taking    No medications on file   Continue Taking    ACETAMINOPHEN (TYLENOL) 325 MG TABLET    Take 2 Tabs by mouth every four (4) hours as needed for Pain. ALBUTEROL (PROVENTIL VENTOLIN) 2.5 MG /3 ML (0.083 %) NEBU    3 mL by Nebulization route every six (6) hours as needed for Wheezing. ALBUTEROL SULFATE (PROAIR RESPICLICK) 90 MCG/ACTUATION AEPB    Take 2 Puffs by inhalation every four (4) hours as needed for Wheezing, Shortness of Breath or Cough. AMLODIPINE (NORVASC) 5 MG TABLET    Take 1 Tab by mouth daily. BISOPROLOL-HYDROCHLOROTHIAZIDE (ZIAC) 2.5-6.25 MG PER TABLET    take 1 tablet by mouth once daily    DAPAGLIFLOZIN (FARXIGA) 10 MG TAB TABLET    Take 10 mg by mouth Every morning. DOXEPIN (SINEQUAN) 25 MG CAPSULE    Take 1 Cap by mouth nightly. GLUCOSE BLOOD VI TEST STRIPS (ONETOUCH ULTRA TEST) STRIP    Check blood glucose as directed    METFORMIN ER (GLUCOPHAGE XR) 500 MG TABLET    take 2 tablets by mouth twice a day    MULTIVITAMIN (ONE A DAY) TABLET    Take 1 Tab by mouth daily.     OMEPRAZOLE (PRILOSEC) 10 MG CAPSULE    take 1 capsule by mouth once daily    ONDANSETRON (ZOFRAN ODT) 4 MG DISINTEGRATING TABLET    Take 1 Tab by mouth every eight (8) hours as needed for Nausea or Vomiting.    These Medications have changed    No medications on file   Stop Taking    No medications on file

## 2020-10-21 NOTE — H&P
Internal Medicine History and Physical  Note           NAME:  Dann Yeung   :   1960   MRN:  [id number]     PCP:  Joshua Landry MD     Date/Time:  10/21/2020 4:43 PM      I hereby certify this patient for admission based upon medical necessity as noted below:        Assessment / plan :        #  Abdominal pain. Possible PSBO. admitted for further management . G surgery consulted. IVF. NPO. Control pains. Serial labs. #  Hypertension (2016). Control BP     # ho  Hyperlipidemia (2016)     #  GERD (gastroesophageal reflux disease) (2016). PPI       # Asthma (2016)     #  Type 2 diabetes mellitus ([facility] [address] (2016). Control blood sugar level. Provide SSI, hypoglycemia protocol and frequent Accu checks. Education . Diabetic Diet         # S/P small bowel resection (2019)  2019      # ho Anemia (8/10/2019)      # Continue home regimen / Medications when appropriate. -DVT prophylaxis :  lovenox. - Code Status : FULL    -Other chronic medical problems as per past history. Further management depend on the course of the case and expanded data base. DISPO - pt to be admitted at this time for reasons addressed above, continued hospitalization for ongoing assessment and treatment indicated        Subjective:     CHIEF COMPLAINT: Abdominal pains 2 days. HISTORY OF PRESENT ILLNESS:     Ms. Lakhwinder Mireles is a 61 y.o.  female who is admitted for abdomina lpains possible MITZI. Ms. Lakhwinder Mireles with past medical history as noted below , presented to the Emergency Department today  complaining of above. Triage and ER notes noted. ER MD wanted to admit the patient to the hospital for further management and called hospitalist to facilitate this process. Abdominal pains and some NV started 2 days ago. She is keeping po fluids water though. abd pains started on r side and mostly stayed on r side.  She took po pain pills and didn't help the pain so she knew this is not something easy and report to RT. In ER CT scan revealed focal ileitis , possible SBO , her surgeon consulted , asked for npo and he will see her. She report normal bowel habits. Deny fever but has significant menopausal symptoms of hot flushes, she is not taking anything for it. She had hysterectomy and ovary removed when she was young.  .     Past Medical History:   Diagnosis Date    Abdominal adhesions     Adnexal cyst 7/30/2019    Left    Anemia     Asthma     Chronic pelvic pain in female 7/30/2019    Diabetes mellitus     Dyskinesia     bilateral    Essential hypertension     GERD (gastroesophageal reflux disease)     Hypertension     Menopause     Microhematuria     Rheumatoid arteritis ([facility] [address] 2018    Stool color black         Past Surgical History:   Procedure Laterality Date    COLONOSCOPY N/A 1/21/2019    COLONOSCOPY performed by Panfilo Garvin MD at [address] [place] [address] ENDOSCOPY    HX [address] [place] [address]  6/28/10    HX COLONOSCOPY      HX ENDOSCOPY      HX HERNIA REPAIR      HX HYSTERECTOMY  1989    Fibroids    HX KNEE REPLACEMENT Left     HX KNEE REPLACEMENT Right 06/2018    HX OOPHORECTOMY  12/2000    HX ORTHOPAEDIC Right     ankle- multiple surgeries    HX SMALL BOWEL RESECTION  12/2000    HX SMALL BOWEL RESECTION  02/21/2017    Dr. Cory Dixon         Social History     Tobacco Use    Smoking status: Never Smoker    Smokeless tobacco: Never Used   Substance Use Topics    Alcohol use: No        Family History   Problem Relation Age of Onset    Hypertension Other         parent    Breast Cancer Other 21    Heart Disease Father     Diabetes Father     Lung Disease Father     Diabetes Mother     Hypertension Other         sibling    Diabetes Other         parent    Kidney Disease Brother     Diabetes Brother     Lung Disease Brother         Allergies   Allergen Reactions    Macrodantin [Nitrofurantoin Macrocrystalline] Itching and Other (comments)    Tape [Adhesive] Other (comments)     Paper tape-- feels like burning skin  Burned skin when had wound vac        Prior to Admission medications    Medication Sig Start Date End Date Taking? Authorizing Provider   omeprazole (PRILOSEC) 10 mg capsule take 1 capsule by mouth once daily 9/29/20   Bruce Castaneda MD   dapagliflozin (FARXIGA) 10 mg tab tablet Take 10 mg by mouth Every morning. Other, MD Yamel   metFORMIN ER (GLUCOPHAGE XR) 500 mg tablet take 2 tablets by mouth twice a day 7/31/20   Bruce Castaneda MD   ondansetron (Zofran ODT) 4 mg disintegrating tablet Take 1 Tab by mouth every eight (8) hours as needed for Nausea or Vomiting. 6/29/20   Rebekah Bryant PA-C   bisoprolol-hydroCHLOROthiazide [facility]) 2.5-6.25 mg per tablet take 1 tablet by mouth once daily 6/15/20   Bruce Castaneda MD   doxepin (SINEquan) 25 mg capsule Take 1 Cap by mouth nightly. 6/4/20   Bruce Castaneda MD   acetaminophen (TYLENOL) 325 mg tablet Take 2 Tabs by mouth every four (4) hours as needed for Pain. 3/2/20   Otis Washburn MD   amLODIPine (NORVASC) 5 mg tablet Take 1 Tab by mouth daily. 2/27/20   Bruce Castaneda MD   albuterol sulfate (PROAIR RESPICLICK) 90 mcg/actuation aepb Take 2 Puffs by inhalation every four (4) hours as needed for Wheezing, Shortness of Breath or Cough. 2/27/20   Bruce Castaneda MD   albuterol (PROVENTIL VENTOLIN) 2.5 mg /3 mL (0.083 %) nebu 3 mL by Nebulization route every six (6) hours as needed for Wheezing. 2/13/20   Bruce Castaneda MD   multivitamin (ONE A DAY) tablet Take 1 Tab by mouth daily.     Provider, Historical   glucose blood VI test strips (ONETOUCH ULTRA TEST) strip Check blood glucose as directed 4/27/17   Bruce Castaneda MD       Review of Systems:  (bold if positive, otherwise negative), apart from what mentioned in HPI  Apart from above patient deny any other significant complain  Gen:  Weight gain, weight loss, fever, chills, fatigue  Eyes: Visual changes, pain, conjunctivitis  ENT:  Sore throat, rhinorrhea, decreased hearing  CVS:  Palpitations, chest pain, dizziness, syncope, edema, PND  Pulm:  Cough, dyspnea, sputum, hemoptysis, wheezing  GI:  Abdominal pain, nausea, emesis, diarrhea, constipation, GERD, melena  :  Hematuria, incontinence, nocturia, dysuria, discharge  MS:  Pain, weakness, swelling, arthritis  Skin:  Rash, erythema, abscess, wound, moles  Endo:  Heat intolerance, cold intolerance, weight gain, polyuria  Hem:  Enlarged nodes, bruising, bleeding, night sweats  Renal:  Edema, change in urine, flank pain  Neuro:  Numbness, tingling, weakness, seizure, headache, tremors       VITALS:    Vital signs reviewed; most recent are:    Visit Vitals  /62   Pulse 72   Temp 98.3 °F (36.8 °C)   Resp 18   Ht 5' 4\" (1.626 m)   Wt 65.8 kg (145 lb)   SpO2 99%   BMI 24.89 kg/m²     SpO2 Readings from Last 6 Encounters:   10/21/20 99%   10/02/20 100%   09/13/20 100%   08/25/20 97%   07/30/20 100%   06/29/20 98%        No intake or output data in the 24 hours ending 10/21/20 1716     Physical Exam:     Gen:  Appear stated age, Well-developed, well-nourished, in no acute distress  HEENT:  Head atraumatic, normocephalic , hearing intact to voice, moist mucous membranes. Neck:  Trachea midline , No apparent JVD, Supple, without masses, thyroid non-tender  Resp:  No accessory muscle use,Bilateral BS present, clear breath sounds without wheezes rales or rhonchi  Card:  No murmurs, normal S1, S2 without Gallop . No Significant lower leg peripheral edema. Abd: diffuse tenderness mostly on r side,   Soft,  non-distended, bowel sounds are present . Musc:  No cyanosis or clubbing. Skin: Warm and dry . No rashes or ulcers, skin turgor is good. Neuro:  Cranial nerves are grossly intact, no clear area of focal motor weakness, follows commands appropriately. Psych:  Good insight, oriented to person, place and time, alert. Labs:     All Cardiac Markers in the last 24 hours: No results found for: CPK, CK, CKMMB, CKMB, RCK3, CKMBT, CKNDX, CKND1, JEANNE, TROPT, TROIQ, ASHUTOSH, TROPT, TNIPOC, BNP, BNPP    Recent Labs     10/21/20  1201   WBC 7.4   HGB 13.6   HCT 42.8        Recent Labs     10/21/20  1201      K 4.2      CO2 28   *   BUN 13   CREA 1.15   CA 9.4   ALB 3.7   TBILI 0.3   ALT 38     Lab Results   Component Value Date/Time    Glucose (POC) 122 (H) 09/13/2020 12:09 PM    Glucose (POC) 71 08/22/2019 05:24 AM    Glucose,  (H) 02/21/2017 01:32 PM    Glucose,  (H) 01/22/2017 02:09 PM    Glucose  09/25/2019 10:55 AM     No results for input(s): PH, PCO2, PO2, HCO3, FIO2 in the last 72 hours. No results for input(s): INR, INREXT, INREXT in the last 72 hours. Ct Abd Pelv W Cont    Result Date: 10/21/2020  IMPRESSION: 1. A few borderline prominent fluid filled bowel loops are seen within the inferior pelvis adjacent to bowel anastomosis staple line. Findings are suggestive of adhesions and focal ileus. No evidence of obstruction in this vicinity. 2. A few small bowel loops also appear partially adhesed to the anterior abdominal wall near the midline ventral incision. A few of these bowel loops have mild bowel wall thickening, suggestive of enteritis. Currently there is similarly no evidence of complete bowel obstruction in this vicinity. 3. Unchanged cystic left adnexal lesion. Please refer to radiology reports. Risk of deterioration: high      Total time spent in the care of this patient: [id number] Brian discussed with: Patient, Nursing Staff, Consultant/Specialist, >50% of time spent in counseling and coordination of care and E RMD      Discussed:  Care Plan       I have personally reviewed all pertinent labs, films and EKGs that have officially resulted.  I reviewed available pertaining electronic documentation outlining the initial presentation as well as the emergency room physician's encounter. This document in whole or part of it has been produced using voice recognition software. Unrecognized errors in transcription may be present.     Attending Physician: Chepe Reyes MD

## 2020-10-22 LAB
ALBUMIN SERPL-MCNC: 2.9 G/DL (ref 3.4–5)
ALBUMIN/GLOB SERPL: 0.9 {RATIO} (ref 0.8–1.7)
ALP SERPL-CCNC: 101 U/L (ref 45–117)
ALT SERPL-CCNC: 31 U/L (ref 13–56)
ANION GAP SERPL CALC-SCNC: 6 MMOL/L (ref 3–18)
AST SERPL-CCNC: 22 U/L (ref 10–38)
BASOPHILS # BLD: 0 K/UL (ref 0–0.1)
BASOPHILS NFR BLD: 0 % (ref 0–2)
BILIRUB DIRECT SERPL-MCNC: <0.1 MG/DL (ref 0–0.2)
BILIRUB SERPL-MCNC: 0.3 MG/DL (ref 0.2–1)
BUN SERPL-MCNC: 12 MG/DL (ref 7–18)
BUN/CREAT SERPL: 12 (ref 12–20)
CALCIUM SERPL-MCNC: 8.7 MG/DL (ref 8.5–10.1)
CHLORIDE SERPL-SCNC: 106 MMOL/L (ref 100–111)
CO2 SERPL-SCNC: 29 MMOL/L (ref 21–32)
CREAT SERPL-MCNC: 0.98 MG/DL (ref 0.6–1.3)
DIFFERENTIAL METHOD BLD: ABNORMAL
EOSINOPHIL # BLD: 0.4 K/UL (ref 0–0.4)
EOSINOPHIL NFR BLD: 5 % (ref 0–5)
ERYTHROCYTE [DISTWIDTH] IN BLOOD BY AUTOMATED COUNT: 15 % (ref 11.6–14.5)
GLOBULIN SER CALC-MCNC: 3.1 G/DL (ref 2–4)
GLUCOSE BLD STRIP.AUTO-MCNC: 105 MG/DL (ref 70–110)
GLUCOSE BLD STRIP.AUTO-MCNC: 114 MG/DL (ref 70–110)
GLUCOSE BLD STRIP.AUTO-MCNC: 141 MG/DL (ref 70–110)
GLUCOSE BLD STRIP.AUTO-MCNC: 193 MG/DL (ref 70–110)
GLUCOSE SERPL-MCNC: 104 MG/DL (ref 74–99)
HCT VFR BLD AUTO: 36.2 % (ref 35–45)
HGB BLD-MCNC: 11.4 G/DL (ref 12–16)
LIPASE SERPL-CCNC: 108 U/L (ref 73–393)
LYMPHOCYTES # BLD: 1.9 K/UL (ref 0.9–3.6)
LYMPHOCYTES NFR BLD: 26 % (ref 21–52)
MAGNESIUM SERPL-MCNC: 1.9 MG/DL (ref 1.6–2.6)
MCH RBC QN AUTO: 26.4 PG (ref 24–34)
MCHC RBC AUTO-ENTMCNC: 31.5 G/DL (ref 31–37)
MCV RBC AUTO: 83.8 FL (ref 74–97)
MONOCYTES # BLD: 0.6 K/UL (ref 0.05–1.2)
MONOCYTES NFR BLD: 9 % (ref 3–10)
NEUTS SEG # BLD: 4.2 K/UL (ref 1.8–8)
NEUTS SEG NFR BLD: 60 % (ref 40–73)
PHOSPHATE SERPL-MCNC: 3.7 MG/DL (ref 2.5–4.9)
PLATELET # BLD AUTO: 325 K/UL (ref 135–420)
PMV BLD AUTO: 11 FL (ref 9.2–11.8)
POTASSIUM SERPL-SCNC: 4.1 MMOL/L (ref 3.5–5.5)
PROT SERPL-MCNC: 6 G/DL (ref 6.4–8.2)
RBC # BLD AUTO: 4.32 M/UL (ref 4.2–5.3)
SODIUM SERPL-SCNC: 141 MMOL/L (ref 136–145)
WBC # BLD AUTO: 7.1 K/UL (ref 4.6–13.2)

## 2020-10-22 PROCEDURE — 99233 SBSQ HOSP IP/OBS HIGH 50: CPT | Performed by: SURGERY

## 2020-10-22 PROCEDURE — 74011250637 HC RX REV CODE- 250/637: Performed by: INTERNAL MEDICINE

## 2020-10-22 PROCEDURE — C9113 INJ PANTOPRAZOLE SODIUM, VIA: HCPCS | Performed by: INTERNAL MEDICINE

## 2020-10-22 PROCEDURE — 83690 ASSAY OF LIPASE: CPT

## 2020-10-22 PROCEDURE — 80048 BASIC METABOLIC PNL TOTAL CA: CPT

## 2020-10-22 PROCEDURE — 84100 ASSAY OF PHOSPHORUS: CPT

## 2020-10-22 PROCEDURE — 82962 GLUCOSE BLOOD TEST: CPT

## 2020-10-22 PROCEDURE — 80076 HEPATIC FUNCTION PANEL: CPT

## 2020-10-22 PROCEDURE — 65270000029 HC RM PRIVATE

## 2020-10-22 PROCEDURE — 74011636637 HC RX REV CODE- 636/637: Performed by: INTERNAL MEDICINE

## 2020-10-22 PROCEDURE — 74011250636 HC RX REV CODE- 250/636: Performed by: INTERNAL MEDICINE

## 2020-10-22 PROCEDURE — 74011000250 HC RX REV CODE- 250: Performed by: INTERNAL MEDICINE

## 2020-10-22 PROCEDURE — 2709999900 HC NON-CHARGEABLE SUPPLY

## 2020-10-22 PROCEDURE — 83735 ASSAY OF MAGNESIUM: CPT

## 2020-10-22 PROCEDURE — 85025 COMPLETE CBC W/AUTO DIFF WBC: CPT

## 2020-10-22 PROCEDURE — 36415 COLL VENOUS BLD VENIPUNCTURE: CPT

## 2020-10-22 RX ORDER — PANTOPRAZOLE SODIUM 40 MG/1
40 TABLET, DELAYED RELEASE ORAL
Status: DISCONTINUED | OUTPATIENT
Start: 2020-10-22 | End: 2020-10-23 | Stop reason: HOSPADM

## 2020-10-22 RX ADMIN — HYDROMORPHONE HYDROCHLORIDE 0.5 MG: 1 INJECTION, SOLUTION INTRAMUSCULAR; INTRAVENOUS; SUBCUTANEOUS at 00:32

## 2020-10-22 RX ADMIN — PANTOPRAZOLE SODIUM 40 MG: 40 TABLET, DELAYED RELEASE ORAL at 17:42

## 2020-10-22 RX ADMIN — INSULIN LISPRO 2 UNITS: 100 INJECTION, SOLUTION INTRAVENOUS; SUBCUTANEOUS at 22:08

## 2020-10-22 RX ADMIN — HYDROMORPHONE HYDROCHLORIDE 0.5 MG: 1 INJECTION, SOLUTION INTRAMUSCULAR; INTRAVENOUS; SUBCUTANEOUS at 17:42

## 2020-10-22 RX ADMIN — HYDROMORPHONE HYDROCHLORIDE 0.5 MG: 1 INJECTION, SOLUTION INTRAMUSCULAR; INTRAVENOUS; SUBCUTANEOUS at 13:39

## 2020-10-22 RX ADMIN — ENOXAPARIN SODIUM 40 MG: 40 INJECTION SUBCUTANEOUS at 08:25

## 2020-10-22 RX ADMIN — HYDROMORPHONE HYDROCHLORIDE 0.5 MG: 1 INJECTION, SOLUTION INTRAMUSCULAR; INTRAVENOUS; SUBCUTANEOUS at 08:25

## 2020-10-22 RX ADMIN — SODIUM CHLORIDE 70 ML/HR: 900 INJECTION, SOLUTION INTRAVENOUS at 23:58

## 2020-10-22 RX ADMIN — Medication 10 ML: at 22:11

## 2020-10-22 RX ADMIN — HYDROMORPHONE HYDROCHLORIDE 0.5 MG: 1 INJECTION, SOLUTION INTRAMUSCULAR; INTRAVENOUS; SUBCUTANEOUS at 04:26

## 2020-10-22 RX ADMIN — Medication 10 ML: at 05:42

## 2020-10-22 RX ADMIN — HYDROMORPHONE HYDROCHLORIDE 0.5 MG: 1 INJECTION, SOLUTION INTRAMUSCULAR; INTRAVENOUS; SUBCUTANEOUS at 22:02

## 2020-10-22 RX ADMIN — INSULIN GLARGINE 13 UNITS: 100 INJECTION, SOLUTION SUBCUTANEOUS at 22:07

## 2020-10-22 RX ADMIN — Medication 10 ML: at 13:40

## 2020-10-22 RX ADMIN — BISOPROLOL FUMARATE AND HYDROCHLOROTHIAZIDE 1 TABLET: 6.25; 2.5 TABLET ORAL at 08:25

## 2020-10-22 RX ADMIN — SODIUM CHLORIDE 40 MG: 9 INJECTION, SOLUTION INTRAMUSCULAR; INTRAVENOUS; SUBCUTANEOUS at 08:25

## 2020-10-22 NOTE — PROGRESS NOTES
conducted an initial consultation and Spiritual Assessment for Deedee Pavon, who is a 61 y.o.,female. Patient's Primary Language is: [place]. According to the patient's EMR Buddhist Affiliation is: [facility].     The reason the Patient came to the hospital is:   Patient Active Problem List    Diagnosis Date Noted    Abdominal pain 10/21/2020    Small bowel obstruction ([address] 09/11/2020    Anemia 08/10/2019    S/P small bowel resection 08/01/2019    Adnexal cyst 07/30/2019    Chronic pelvic pain in female 07/30/2019    Bowel obstruction ([address] 08/23/2018    Non-intractable cyclical vomiting with nausea 01/24/2018    Type 2 diabetes mellitus with nephropathy ([address] 01/19/2018    Chronic abdominal pain 03/14/2017    Post-operative pain 03/14/2017    SBO (small bowel obstruction) ([address] 02/21/2017    Osteoarthritis of left knee 06/27/2016    Hypertension 06/27/2016    Hyperlipidemia 06/27/2016    GERD (gastroesophageal reflux disease) 06/27/2016    Asthma 06/27/2016    Type 2 diabetes mellitus ([address] 06/27/2016    Sural neuritis 06/23/2014    Chest pain 04/20/2014    Essential hypertension, benign 02/27/2012    Generalized abdominal pain         The  provided the following Interventions:  Initiated a relationship of care and support through inquiring concerning Ms. Damian's well-being. .   Explored issues of meeta, belief, spirituality and Latter-day/ritual needs while hospitalized. Celebrated with her concerning her progress. Provided information about Spiritual Care Services. Offered prayer as requested and assurance of continued prayers on patient's behalf. Chart reviewed. The following outcomes where achieved:  Patient shared limited information about both their medical narrative and spiritual journey/beliefs.  confirmed Patient's Buddhist Affiliation. Patient processed feeling about current hospitalization.   Patient expressed gratitude for 's visit. Assessment:  Patient does not have any Hindu/cultural needs that will affect patient's preferences in health care. There are no spiritual or Hindu issues which require intervention at this time. Plan:  Chaplains will continue to follow and will provide pastoral care on an as needed/requested basis.  recommends bedside caregivers page  on duty if patient shows signs of acute spiritual or emotional distress.     [address]   [phone]

## 2020-10-22 NOTE — PROGRESS NOTES
0715-  Bedside and Verbal shift change report given to Maday Nava RN (oncoming nurse) by Jared Flynn RN (offgoing nurse). Report included the following information SBAR, Kardex, Intake/Output, MAR and Recent Results. 5886-  Patient medicated for pain. 1339-  Patient medicated for pain. 1915-  Bedside and Verbal shift change report given to  GUILLERMINAKailash MCCLURE (oncoming nurse) by Maday Nava RN (offgoing nurse). Report included the following information SBAR, Kardex, Intake/Output, MAR and Recent Results.

## 2020-10-22 NOTE — PROGRESS NOTES
General Surgery Consult      Evon Elliott [facility]  Admit date: 10/21/2020    MRN: [id number]     : 1960     Age: 61 y.o. Attending Physician: Jovan Evangelista MD, FACS      Subjective:     Dale Arguello is a 61 y.o. female who is very well-known to me and has presented with long history of abdominal pain and multiple history of recurrent intermittent small bowel obstruction. She was admitted yesterday because of abdominal pain that has been there for about 1 to 2 days and by the time she was admitted to the floor she has been feeling great and she has been passing flatus and having normal bowel movement. Overnight the patient stated she is feeling great and she had a bowel movement at 4 in the morning. She denies any nausea or vomiting. Her vital signs are normal with no fever or tachycardia and today her WBC is normal as well as her creatinine. She would like to go home.      Patient Active Problem List    Diagnosis Date Noted    Abdominal pain 10/21/2020    Small bowel obstruction ([address] 2020    Anemia 08/10/2019    S/P small bowel resection 2019    Adnexal cyst 2019    Chronic pelvic pain in female 2019    Bowel obstruction (HCC) 2018    Non-intractable cyclical vomiting with nausea 2018    Type 2 diabetes mellitus with nephropathy ([address] 2018    Chronic abdominal pain 2017    Post-operative pain 2017    SBO (small bowel obstruction) ([address] 2017    Osteoarthritis of left knee 2016    Hypertension 2016    Hyperlipidemia 2016    GERD (gastroesophageal reflux disease) 2016    Asthma 2016    Type 2 diabetes mellitus ([address] 2016    Sural neuritis 2014    Chest pain 2014    Essential hypertension, benign 2012    Generalized abdominal pain      Past Medical History:   Diagnosis Date    Abdominal adhesions     Adnexal cyst 2019    Left    Anemia     Asthma     Chronic pelvic pain in female 7/30/2019    Diabetes mellitus     Dyskinesia     bilateral    Essential hypertension     GERD (gastroesophageal reflux disease)     Hypertension     Menopause     Microhematuria     Rheumatoid arteritis ([address] 2018    Stool color black       Past Surgical History:   Procedure Laterality Date    COLONOSCOPY N/A 1/21/2019    COL[facility] performed [facility] Garvin [facility] at [facility] ENDOSCOPY     [facility]  6/28/10    HX COLONOSCOPY      HX ENDOSCOPY      HX HERNIA REPAIR      HX HYSTERECTOMY  1989    Fibroids    HX KNEE REPLACEMENT Left     HX KNEE REPLACEMENT Right 06/2018    HX OOPHORECTOMY  12/2000    HX ORTHOPAEDIC Right     ankle- multiple surgeries    HX SMALL BOWEL RESECTION  12/2000    HX SMALL BOWEL RESECTION  02/21/2017    Dr. Heriberto Owens        Social History     Tobacco Use    Smoking status: Never Smoker    Smokeless tobacco: Never Used   Substance Use Topics    Alcohol use: No      Social History     Tobacco Use   Smoking Status Never Smoker   Smokeless Tobacco Never Used     Family History   Problem Relation Age of Onset    Hypertension Other         parent    Breast Cancer Other 21    Heart Disease Father     Diabetes Father     Lung Disease Father     Diabetes Mother     Hypertension Other         sibling    Diabetes Other         parent    Kidney Disease Brother     Diabetes Brother     Lung Disease Brother       Current Facility-Administered Medications   Medication Dose Route Frequency    0.9% sodium chloride infusion  125 mL/hr IntraVENous CONTINUOUS    albuterol-ipratropium (DUO-NEB) 2.5 MG-0.5 MG/3 ML  3 mL Nebulization Q4H PRN    bisoprolol-hydroCHLOROthiazide (ZIAC) 2.5-6.25 mg per tablet 1 Tab  1 Tab Oral DAILY    pantoprazole (PROTONIX) 40 mg in 0.9% sodium chloride 10 mL injection  40 mg IntraVENous DAILY    sodium chloride (NS) flush 5-40 mL  5-40 mL IntraVENous Q8H    sodium chloride (NS) flush 5-40 mL  5-40 mL IntraVENous PRN    acetaminophen (TYLENOL) tablet 650 mg  650 mg Oral Q6H PRN    Or    acetaminophen (TYLENOL) suppository 650 mg  650 mg Rectal Q6H PRN    polyethylene glycol (MIRALAX) packet 17 g  17 g Oral DAILY PRN    promethazine (PHENERGAN) tablet 12.5 mg  12.5 mg Oral Q6H PRN    Or    ondansetron (ZOFRAN) injection 4 mg  4 mg IntraVENous Q6H PRN    enoxaparin (LOVENOX) injection 40 mg  40 mg SubCUTAneous DAILY    insulin glargine (LANTUS) injection 13 Units  0.2 Units/kg SubCUTAneous QHS    insulin lispro (HUMALOG) injection   SubCUTAneous AC&HS    glucose chewable tablet 16 g  4 Tab Oral PRN    glucagon (GLUCAGEN) injection 1 mg  1 mg IntraMUSCular PRN    dextrose (D50) infusion 12.5-25 g  25-50 mL IntraVENous PRN    HYDROmorphone (DILAUDID) syringe 0.5 mg  0.5 mg IntraVENous Q4H PRN    naloxone (NARCAN) injection 0.4 mg  0.4 mg IntraVENous PRN      Allergies   Allergen Reactions    Macrodantin [Nitrofurantoin Macrocrystalline] Itching and Other (comments)    Tape [Adhesive] Other (comments)     Paper tape-- feels like burning skin  Burned skin when had wound vac        Review of Systems:  Constitutional: negative  Eyes: negative  Ears, Nose, Mouth, Throat, and Face: negative  Respiratory: negative  Cardiovascular: negative  Gastrointestinal: positive for abdominal pain  Genitourinary:negative  Integument/Breast: negative  Hematologic/Lymphatic: negative  Musculoskeletal:negative  Neurological: negative  Behavioral/Psychiatric: negative  Endocrine: negative  Allergic/Immunologic: negative      Objective:     Visit Vitals  /71 (BP 1 Location: Right arm)   Pulse 73   Temp 97.8 °F (36.6 °C)   Resp 18   Ht 5' 4\" (1.626 m)   Wt 65.8 kg (145 lb)   SpO2 97%   BMI 24.89 kg/m²       Physical Exam:      General:  in no apparent distress, alert, oriented times 3, afebrile, normal vitals and cooperative   Eyes:  conjunctivae and sclerae normal, pupils equal, round, reactive to light   Throat & Neck: no erythema or exudates noted and neck supple and symmetrical; no palpable masses   Lungs:   clear to auscultation bilaterally   Heart:  Regular rate and rhythm   Abdomen:   rounded, soft, nontender, nondistended, no masses or organomegaly. No evidence of abdominal wall hernias. Extremities: extremities normal, atraumatic, no cyanosis or edema   Skin: Normal.         Imaging and Lab Review:     CBC:   Lab Results   Component Value Date/Time    WBC 7.1 10/22/2020 04:00 AM    RBC 4.32 10/22/2020 04:00 AM    HGB 11.4 (L) 10/22/2020 04:00 AM    HCT 36.2 10/22/2020 04:00 AM    PLATELET 553 07/73/5939 04:00 AM     BMP:   Lab Results   Component Value Date/Time    Glucose 104 (H) 10/22/2020 04:00 AM    Sodium 141 10/22/2020 04:00 AM    Potassium 4.1 10/22/2020 04:00 AM    Chloride 106 10/22/2020 04:00 AM    CO2 29 10/22/2020 04:00 AM    BUN 12 10/22/2020 04:00 AM    Creatinine 0.98 10/22/2020 04:00 AM    Calcium 8.7 10/22/2020 04:00 AM     CMP:  Lab Results   Component Value Date/Time    Glucose 104 (H) 10/22/2020 04:00 AM    Sodium 141 10/22/2020 04:00 AM    Potassium 4.1 10/22/2020 04:00 AM    Chloride 106 10/22/2020 04:00 AM    CO2 29 10/22/2020 04:00 AM    BUN 12 10/22/2020 04:00 AM    Creatinine 0.98 10/22/2020 04:00 AM    Calcium 8.7 10/22/2020 04:00 AM    Anion gap 6 10/22/2020 04:00 AM    BUN/Creatinine ratio 12 10/22/2020 04:00 AM    Alk.  phosphatase PENDING 10/22/2020 04:00 AM    Protein, total PENDING 10/22/2020 04:00 AM    Albumin 2.9 (L) 10/22/2020 04:00 AM    Globulin PENDING 10/22/2020 04:00 AM    A-G Ratio PENDING 10/22/2020 04:00 AM       Recent Results (from the past 24 hour(s))   URINALYSIS W/ RFLX MICROSCOPIC    Collection Time: 10/21/20 11:27 AM   Result Value Ref Range    Color YELLOW      Appearance CLEAR      Specific gravity 1.018 1.005 - 1.030      pH (UA) 5.0 5.0 - 8.0      Protein Negative NEG mg/dL    Glucose >1,000 (A) NEG mg/dL    Ketone Negative NEG mg/dL    Bilirubin Negative NEG      Blood Negative NEG      Urobilinogen 0.2 0.2 - 1.0 EU/dL    Nitrites Negative NEG      Leukocyte Esterase Negative NEG     CBC WITH AUTOMATED DIFF    Collection Time: 10/21/20 12:01 PM   Result Value Ref Range    WBC 7.4 4.6 - 13.2 K/uL    RBC 5.09 4. 20 - 5.30 M/uL    HGB 13.6 12.0 - 16.0 g/dL    HCT 42.8 35.0 - 45.0 %    MCV 84.1 74.0 - 97.0 FL    MCH 26.7 24.0 - 34.0 PG    MCHC 31.8 31.0 - 37.0 g/dL    RDW 14.9 (H) 11.6 - 14.5 %    PLATELET 887 021 - 543 K/uL    MPV 10.3 9.2 - 11.8 FL    NEUTROPHILS 61 40 - 73 %    LYMPHOCYTES 25 21 - 52 %    MONOCYTES 8 3 - 10 %    EOSINOPHILS 6 (H) 0 - 5 %    BASOPHILS 0 0 - 2 %    ABS. NEUTROPHILS 4.5 1.8 - 8.0 K/UL    ABS. LYMPHOCYTES 1.8 0.9 - 3.6 K/UL    ABS. MONOCYTES 0.6 0.05 - 1.2 K/UL    ABS. EOSINOPHILS 0.5 (H) 0.0 - 0.4 K/UL    ABS. BASOPHILS 0.0 0.0 - 0.1 K/UL    DF AUTOMATED     METABOLIC PANEL, COMPREHENSIVE    Collection Time: 10/21/20 12:01 PM   Result Value Ref Range    Sodium 137 136 - 145 mmol/L    Potassium 4.2 3.5 - 5.5 mmol/L    Chloride 102 100 - 111 mmol/L    CO2 28 21 - 32 mmol/L    Anion gap 7 3.0 - 18 mmol/L    Glucose 104 (H) 74 - 99 mg/dL    BUN 13 7.0 - 18 MG/DL    Creatinine 1.15 0.6 - 1.3 MG/DL    BUN/Creatinine ratio 11 (L) 12 - 20      GFR est AA 58 (L) >60 ml/min/1.73m2    GFR est non-AA 48 (L) >60 ml/min/1.73m2    Calcium 9.4 8.5 - 10.1 MG/DL    Bilirubin, total 0.3 0.2 - 1.0 MG/DL    ALT (SGPT) 38 13 - 56 U/L    AST (SGOT) 28 10 - 38 U/L    Alk.  phosphatase 128 (H) 45 - 117 U/L    Protein, total 8.0 6.4 - 8.2 g/dL    Albumin 3.7 3.4 - 5.0 g/dL    Globulin 4.3 (H) 2.0 - 4.0 g/dL    A-G Ratio 0.9 0.8 - 1.7     GLUCOSE, POC    Collection Time: 10/21/20  9:10 PM   Result Value Ref Range    Glucose (POC) 242 (H) 70 - 110 mg/dL   GLUCOSE, POC    Collection Time: 10/21/20  9:15 PM   Result Value Ref Range    Glucose (POC) 115 (H) 70 - 110 mg/dL   CBC WITH AUTOMATED DIFF    Collection Time: 10/22/20  4:00 AM Result Value Ref Range    WBC 7.1 4.6 - 13.2 K/uL    RBC 4.32 4.20 - 5.30 M/uL    HGB 11.4 (L) 12.0 - 16.0 g/dL    HCT 36.2 35.0 - 45.0 %    MCV 83.8 74.0 - 97.0 FL    MCH 26.4 24.0 - 34.0 PG    MCHC 31.5 31.0 - 37.0 g/dL    RDW 15.0 (H) 11.6 - 14.5 %    PLATELET 885 234 - 313 K/uL    MPV 11.0 9.2 - 11.8 FL    NEUTROPHILS 60 40 - 73 %    LYMPHOCYTES 26 21 - 52 %    MONOCYTES 9 3 - 10 %    EOSINOPHILS 5 0 - 5 %    BASOPHILS 0 0 - 2 %    ABS. NEUTROPHILS 4.2 1.8 - 8.0 K/UL    ABS. LYMPHOCYTES 1.9 0.9 - 3.6 K/UL    ABS. MONOCYTES 0.6 0.05 - 1.2 K/UL    ABS. EOSINOPHILS 0.4 0.0 - 0.4 K/UL    ABS. BASOPHILS 0.0 0.0 - 0.1 K/UL    DF AUTOMATED     METABOLIC PANEL, BASIC    Collection Time: 10/22/20  4:00 AM   Result Value Ref Range    Sodium 141 136 - 145 mmol/L    Potassium 4.1 3.5 - 5.5 mmol/L    Chloride 106 100 - 111 mmol/L    CO2 29 21 - 32 mmol/L    Anion gap 6 3.0 - 18 mmol/L    Glucose 104 (H) 74 - 99 mg/dL    BUN 12 7.0 - 18 MG/DL    Creatinine 0.98 0.6 - 1.3 MG/DL    BUN/Creatinine ratio 12 12 - 20      GFR est AA >60 >60 ml/min/1.73m2    GFR est non-AA 58 (L) >60 ml/min/1.73m2    Calcium 8.7 8.5 - 10.1 MG/DL   MAGNESIUM    Collection Time: 10/22/20  4:00 AM   Result Value Ref Range    Magnesium 1.9 1.6 - 2.6 mg/dL   LIPASE    Collection Time: 10/22/20  4:00 AM   Result Value Ref Range    Lipase 108 73 - 393 U/L   HEPATIC FUNCTION PANEL    Collection Time: 10/22/20  4:00 AM   Result Value Ref Range    Protein, total PENDING g/dL    Albumin 2.9 (L) 3.4 - 5.0 g/dL    Globulin PENDING g/dL    A-G Ratio PENDING      Bilirubin, total PENDING MG/DL    Bilirubin, direct PENDING MG/DL    Alk. phosphatase PENDING U/L    AST (SGOT) PENDING U/L    ALT (SGPT) 31 13 - 56 U/L       images and reports reviewed    Assessment:   Amparo Callejas is a 61 y.o. female who is very well-known to me and she presented there with one 2-day history of sudden onset abdominal pain.   Because of her previous history of bowel obstruction she was afraid and she came to the emergency room and her CT scan showed possible small amount of ileus but no evidence of bowel obstruction. She was admitted for observation. She has been doing great and she is passing flatus and having normal bowel movement.      Plan:     I appreciate medicine management and admission  I placed the patient on regular diet  Discharge planning today if okay with primary team  Follow-up with me as needed    Please call me if you have any questions (cell phone: [phone])     Signed By: Mai Gowers, MD     October 22, 2020

## 2020-10-22 NOTE — PROGRESS NOTES
Problem: Falls - Risk of  Goal: *Absence of Falls  Description: Document Cheng Gilbert Fall Risk and appropriate interventions in the flowsheet.   Outcome: Progressing Towards Goal  Note: Fall Risk Interventions:            Medication Interventions: Assess postural VS orthostatic hypotension                   Problem: Patient Education: Go to Patient Education Activity  Goal: Patient/Family Education  Outcome: Progressing Towards Goal     Problem: Pain  Goal: *Control of Pain  Outcome: Progressing Towards Goal     Problem: Patient Education: Go to Patient Education Activity  Goal: Patient/Family Education  Outcome: Progressing Towards Goal

## 2020-10-22 NOTE — PROGRESS NOTES
Problem: Falls - Risk of  Goal: *Absence of Falls  Description: Document Abdiel Hollis Fall Risk and appropriate interventions in the flowsheet.   Outcome: Progressing Towards Goal  Note: Fall Risk Interventions:            Medication Interventions: Assess postural VS orthostatic hypotension                   Problem: Pain  Goal: *Control of Pain  Outcome: Progressing Towards Goal

## 2020-10-22 NOTE — PROGRESS NOTES
Internal Medicine Progress Note        NAME: Amy Gann   :  1960  MRM:  [id number]    Date/Time: 10/22/2020        ASSESSMENT/PLAN:     #  Abdominal pains. Improved. Seen by surgery team , advanced diet. No evidence of obstruction. IVF. Control pains. Serial labs. Still having some pains. Passing BMs  DW surgery, if more improvement in am she can go home.       #  Hypertension (2016). Control BP      # ho  Hyperlipidemia (2016)      #  GERD (gastroesophageal reflux disease) (2016). PPI        # Asthma (2016)      #  Type 2 diabetes mellitus ([facility] (2016). Control blood sugar level. Provide SSI, hypoglycemia protocol and frequent Accu checks. Education . Diabetic Diet           # S/P small bowel resection (2019)  2019       # ho Anemia (8/10/2019)       # Continue home regimen / Medications when appropriate.      -DVT prophylaxis :  lovenox. - Code Status : FULL  Lab Review:     Recent Labs     10/22/20  0400 10/21/20  1201   WBC 7.1 7.4   HGB 11.4* 13.6   HCT 36.2 42.8    343     Recent Labs     10/22/20  0400 10/21/20  1201    137   K 4.1 4.2    102   CO2 29 28   * 104*   BUN 12 13   CREA 0.98 1.15   CA 8.7 9.4   MG 1.9  --    PHOS 3.7  --    ALB 2.9* 3.7   TBILI 0.3 0.3   ALT 31 38     Lab Results   Component Value Date/Time    Glucose (POC) 141 (H) 10/22/2020 11:26 AM    Glucose (POC) 114 (H) 10/22/2020 07:56 AM    Glucose (POC) 115 (H) 10/21/2020 09:15 PM    Glucose (POC) 242 (H) 10/21/2020 09:10 PM    Glucose (POC) 122 (H) 2020 12:09 PM    Glucose,  (H) 2017 01:32 PM    Glucose,  (H) 2017 02:09 PM     No results for input(s): PH, PCO2, PO2, HCO3, FIO2 in the last 72 hours. No results for input(s): INR, INREXT in the last 72 hours.     No results found for: SDES  Lab Results   Component Value Date/Time    Culture result: No significant growth, <10,000 CFU/mL 2020 02:30 PM    Culture result: No growth (<1,000 CFU/ML) 04/07/2020 09:19 PM    Culture result:  03/02/2020 05:59 PM     91718  COLONIES/mL  MIXED GRAM POSITIVE LINDA, PROBABLE SKIN/GENITAL CONTAMINATION. All Cardiac Markers in the last 24 hours: No results found for: CPK, CK, CKMMB, CKMB, RCK3, CKMBT, CKNDX, CKND1, JEANNE, TROPT, TROIQ, ASHUTOSH, TROPT, TNIPOC, BNP, BNPP        Intervals noted   Pt s/e @ bedside     Subjective:     Chief Complaint:      Still have bad pains mostly on r side. No NV. Passing BM. Ate some in am , willing to try real more food. ROS:  No CP/SOB/N/V/D/ Bleeding/ acute joint swelling/rash/itching/fever/chills. Tolerating Diet                Objective:     Vitals:  Last 24hrs VS reviewed since prior progress note. Most recent are:    Visit Vitals  BP 97/64 (BP 1 Location: Right arm, BP Patient Position: At rest)   Pulse 71   Temp 97.4 °F (36.3 °C)   Resp 18   Ht 5' 4\" (1.626 m)   Wt 65.8 kg (145 lb)   SpO2 97%   BMI 24.89 kg/m²     SpO2 Readings from Last 6 Encounters:   10/22/20 97%   10/02/20 100%   09/13/20 100%   08/25/20 97%   07/30/20 100%   06/29/20 98%            Intake/Output Summary (Last 24 hours) at 10/22/2020 1331  Last data filed at 10/22/2020 0218  Gross per 24 hour   Intake 1371.67 ml   Output    Net 1371.67 ml          Physical Exam:   Gen:  Appear stated age, Well-developed, well-nourished, in no acute distress  HEENT:  Head atraumatic, normocephalic , hearing intact to voice, moist mucous membranes. Neck:   Trachea midline , No apparent JVD, Supple, without masses, thyroid non-tender  Resp:  No accessory muscle use,Bilateral BS present, clear breath sounds without wheezes rales or rhonchi  Card:  No murmurs, normal S1, S2 without Gallop . No Significant lower leg peripheral edema. Abd:   tenderness mostly on R side,   Soft,  non-distended, bowel sounds are present . Musc:  No cyanosis or clubbing. Skin:   Warm and dry . No rashes or ulcers, skin turgor is good.    Neuro:  Cranial nerves are grossly intact, no clear area of focal motor weakness, follows commands appropriately. Psych:  Good insight, oriented to person, place and time, alert.     Medications Reviewed: (see below)    Lab Data Reviewed: (see below)    ______________________________________________________________________    Medications:     Current Facility-Administered Medications   Medication Dose Route Frequency    0.9% sodium chloride infusion  125 mL/hr IntraVENous CONTINUOUS    albuterol-ipratropium (DUO-NEB) 2.5 MG-0.5 MG/3 ML  3 mL Nebulization Q4H PRN    bisoprolol-hydroCHLOROthiazide (ZIAC) 2.5-6.25 mg per tablet 1 Tab  1 Tab Oral DAILY    pantoprazole (PROTONIX) 40 mg in 0.9% sodium chloride 10 mL injection  40 mg IntraVENous DAILY    sodium chloride (NS) flush 5-40 mL  5-40 mL IntraVENous Q8H    sodium chloride (NS) flush 5-40 mL  5-40 mL IntraVENous PRN    acetaminophen (TYLENOL) tablet 650 mg  650 mg Oral Q6H PRN    Or    acetaminophen (TYLENOL) suppository 650 mg  650 mg Rectal Q6H PRN    polyethylene glycol (MIRALAX) packet 17 g  17 g Oral DAILY PRN    promethazine (PHENERGAN) tablet 12.5 mg  12.5 mg Oral Q6H PRN    Or    ondansetron (ZOFRAN) injection 4 mg  4 mg IntraVENous Q6H PRN    enoxaparin (LOVENOX) injection 40 mg  40 mg SubCUTAneous DAILY    insulin glargine (LANTUS) injection 13 Units  0.2 Units/kg SubCUTAneous QHS    insulin lispro (HUMALOG) injection   SubCUTAneous AC&HS    glucose chewable tablet 16 g  4 Tab Oral PRN    glucagon (GLUCAGEN) injection 1 mg  1 mg IntraMUSCular PRN    dextrose (D50) infusion 12.5-25 g  25-50 mL IntraVENous PRN    HYDROmorphone (DILAUDID) syringe 0.5 mg  0.5 mg IntraVENous Q4H PRN    naloxone (NARCAN) injection 0.4 mg  0.4 mg IntraVENous PRN          Total time spent with patient: 35 minutes                  Care Plan discussed with: Patient, Care Manager, Nursing Staff, Consultant/Specialist and >50% of time spent in counseling and coordination of care    Discussed:  Care Plan     Disposition:  Home w/Family           This document in whole or part of it has been produced using voice recognition software. Unrecognized errors in transcription may be present.     Attending Physician: Glen Tobin MD

## 2020-10-22 NOTE — PROGRESS NOTES
Problem: Discharge Planning  Goal: *Discharge to safe environment  Outcome: Resolved/Met     Home    Reason for Admission:   Abd pain, ? PSBO               RUR Score:  23       PCP:First and Last name: Dr. Rodger Davis   Name of Practice:   Are you a current patient: Yes/No: Yes   Approximate date of last visit: 10/14/2020   Can you do a virtual visit with your PCP:              Resources/supports as identified by patient/family:   Ins. PCP                Top Challenges facing patient (as identified by patient/family and CM): Finances/Medication cost?   [employer] ins. Transportation? Family/self              Support system or lack thereof?  family                     Living arrangements? Lives with spouse, pt's mother lives with them as well. Self-care/ADLs/Cognition? Independent          Current Advanced Directive/Advance Care Plan:  None, does not want to complete one @ this time                          Plan for utilizing home health:   Not indicated                  Transition of Care Plan:   Home with family & out-pt follow up when medically stable. Chart reviewed. Met with pt., verified all demographics. States has [employer] ins. NOK: Patricio Arreaga, spouse with whom she lives with & will pick her up @ discharge. Has wheel chair, walker & cane, not currently using any DME. Independent with ADL's prior to admit. Will cont to follow for any needs. Ashley JuárezRN,ext [phone]. Patient has designated her spouse to participate in his/her discharge plan and to receive any needed information. Name: Patricio Arreaga  Address:  Phone number:  [phone]    Care Management Interventions  PCP Verified by CM: Yes(Dr. Deyvi Rivero)  Last Visit to PCP: 10/14/20  Palliative Care Criteria Met (RRAT>21 & CHF Dx)?: No  Mode of Transport at Discharge:  Other (see comment)(family)  Transition of Care Consult (CM Consult): Discharge Planning  Discharge Durable Medical Equipment: No  Physical Therapy Consult: No  Occupational Therapy Consult: No  Speech Therapy Consult: No  Current Support Network: Lives with Spouse  Confirm Follow Up Transport: Self  Discharge Location  Discharge Placement: Home

## 2020-10-23 VITALS
WEIGHT: 145 LBS | BODY MASS INDEX: 24.75 KG/M2 | HEART RATE: 80 BPM | TEMPERATURE: 98.1 F | HEIGHT: 64 IN | DIASTOLIC BLOOD PRESSURE: 96 MMHG | OXYGEN SATURATION: 100 % | RESPIRATION RATE: 18 BRPM | SYSTOLIC BLOOD PRESSURE: 128 MMHG

## 2020-10-23 LAB
ANION GAP SERPL CALC-SCNC: 5 MMOL/L (ref 3–18)
BASOPHILS # BLD: 0 K/UL (ref 0–0.1)
BASOPHILS NFR BLD: 0 % (ref 0–2)
BUN SERPL-MCNC: 12 MG/DL (ref 7–18)
BUN/CREAT SERPL: 13 (ref 12–20)
CALCIUM SERPL-MCNC: 8.4 MG/DL (ref 8.5–10.1)
CHLORIDE SERPL-SCNC: 107 MMOL/L (ref 100–111)
CO2 SERPL-SCNC: 29 MMOL/L (ref 21–32)
CREAT SERPL-MCNC: 0.92 MG/DL (ref 0.6–1.3)
DIFFERENTIAL METHOD BLD: ABNORMAL
EOSINOPHIL # BLD: 0.3 K/UL (ref 0–0.4)
EOSINOPHIL NFR BLD: 6 % (ref 0–5)
ERYTHROCYTE [DISTWIDTH] IN BLOOD BY AUTOMATED COUNT: 15.1 % (ref 11.6–14.5)
GLUCOSE BLD STRIP.AUTO-MCNC: 114 MG/DL (ref 70–110)
GLUCOSE SERPL-MCNC: 167 MG/DL (ref 74–99)
HCT VFR BLD AUTO: 35.4 % (ref 35–45)
HGB BLD-MCNC: 11 G/DL (ref 12–16)
LYMPHOCYTES # BLD: 1.5 K/UL (ref 0.9–3.6)
LYMPHOCYTES NFR BLD: 28 % (ref 21–52)
MAGNESIUM SERPL-MCNC: 2.1 MG/DL (ref 1.6–2.6)
MCH RBC QN AUTO: 26.1 PG (ref 24–34)
MCHC RBC AUTO-ENTMCNC: 31.1 G/DL (ref 31–37)
MCV RBC AUTO: 83.9 FL (ref 74–97)
MONOCYTES # BLD: 0.5 K/UL (ref 0.05–1.2)
MONOCYTES NFR BLD: 9 % (ref 3–10)
NEUTS SEG # BLD: 3 K/UL (ref 1.8–8)
NEUTS SEG NFR BLD: 57 % (ref 40–73)
PLATELET # BLD AUTO: 322 K/UL (ref 135–420)
PMV BLD AUTO: 10.8 FL (ref 9.2–11.8)
POTASSIUM SERPL-SCNC: 4.3 MMOL/L (ref 3.5–5.5)
RBC # BLD AUTO: 4.22 M/UL (ref 4.2–5.3)
SODIUM SERPL-SCNC: 141 MMOL/L (ref 136–145)
WBC # BLD AUTO: 5.2 K/UL (ref 4.6–13.2)

## 2020-10-23 PROCEDURE — 83735 ASSAY OF MAGNESIUM: CPT

## 2020-10-23 PROCEDURE — 82962 GLUCOSE BLOOD TEST: CPT

## 2020-10-23 PROCEDURE — 2709999900 HC NON-CHARGEABLE SUPPLY

## 2020-10-23 PROCEDURE — 85025 COMPLETE CBC W/AUTO DIFF WBC: CPT

## 2020-10-23 PROCEDURE — 74011250637 HC RX REV CODE- 250/637: Performed by: INTERNAL MEDICINE

## 2020-10-23 PROCEDURE — 80048 BASIC METABOLIC PNL TOTAL CA: CPT

## 2020-10-23 PROCEDURE — 36415 COLL VENOUS BLD VENIPUNCTURE: CPT

## 2020-10-23 PROCEDURE — 74011250636 HC RX REV CODE- 250/636: Performed by: INTERNAL MEDICINE

## 2020-10-23 RX ADMIN — ENOXAPARIN SODIUM 40 MG: 40 INJECTION SUBCUTANEOUS at 08:40

## 2020-10-23 RX ADMIN — BISOPROLOL FUMARATE AND HYDROCHLOROTHIAZIDE 1 TABLET: 6.25; 2.5 TABLET ORAL at 08:38

## 2020-10-23 RX ADMIN — Medication 10 ML: at 06:42

## 2020-10-23 RX ADMIN — HYDROMORPHONE HYDROCHLORIDE 0.5 MG: 1 INJECTION, SOLUTION INTRAMUSCULAR; INTRAVENOUS; SUBCUTANEOUS at 02:15

## 2020-10-23 RX ADMIN — HYDROMORPHONE HYDROCHLORIDE 0.5 MG: 1 INJECTION, SOLUTION INTRAMUSCULAR; INTRAVENOUS; SUBCUTANEOUS at 06:43

## 2020-10-23 RX ADMIN — PANTOPRAZOLE SODIUM 40 MG: 40 TABLET, DELAYED RELEASE ORAL at 08:38

## 2020-10-23 NOTE — PROGRESS NOTES
1101 Discharge instructions provided to pt on behalf of primary nurse Stanislav Butler. Peripheral iv removed. Pt called  to .

## 2020-10-23 NOTE — PROGRESS NOTES
Chart reviewed. Plan remains home when medically stable. Available as needed. Ashley Juárez RN,ext [phone].

## 2020-10-23 NOTE — PROGRESS NOTES
Bedside shift change report given to this RN (oncoming nurse) by [employer]. Joseluis Cantu RN (offgoing nurse). Report included the following information SBAR and Kardex.

## 2020-10-23 NOTE — PROGRESS NOTES
Problem: Discharge Planning  Goal: *Discharge to safe environment  Outcome: Resolved/Met     Home    Noted orders for discharge, no services ordered. Ashley Juárez RN,ext [phone]. Care Management Interventions  PCP Verified by CM: Yes(Dr. Steven Hansen)  Last Visit to PCP: 10/14/20  Palliative Care Criteria Met (RRAT>21 & CHF Dx)?: No  Mode of Transport at Discharge:  Other (see comment)(family)  Transition of Care Consult (CM Consult): Discharge Planning  Discharge Durable Medical Equipment: No  Physical Therapy Consult: No  Occupational Therapy Consult: No  Speech Therapy Consult: No  Current Support Network: Lives with Spouse  Confirm Follow Up Transport: Self  Discharge Location  Discharge Placement: Home

## 2020-10-23 NOTE — DISCHARGE INSTRUCTIONS
DISCHARGE SUMMARY from Nurse    PATIENT INSTRUCTIONS:    After general anesthesia or intravenous sedation, for 24 hours or while taking prescription Narcotics:  · Limit your activities  · Do not drive and operate hazardous machinery  · Do not make important personal or business decisions  · Do  not drink alcoholic beverages  · If you have not urinated within 8 hours after discharge, please contact your surgeon on call. Report the following to your surgeon:  · Excessive pain, swelling, redness or odor of or around the surgical area  · Temperature over 100.5  · Nausea and vomiting lasting longer than 4 hours or if unable to take medications  · Any signs of decreased circulation or nerve impairment to extremity: change in color, persistent  numbness, tingling, coldness or increase pain  · Any questions    What to do at Home:  Recommended activity: Activity as tolerated    If you experience any of the following symptoms abdominal pain, unable to have bowel movement, fever, or chills, please follow up with primary care physician/Dr. Smitha Guillaume. *  Please give a list of your current medications to your Primary Care Provider. *  Please update this list whenever your medications are discontinued, doses are      changed, or new medications (including over-the-counter products) are added. *  Please carry medication information at all times in case of emergency situations. These are general instructions for a healthy lifestyle:    No smoking/ No tobacco products/ Avoid exposure to second hand smoke  Surgeon General's Warning:  Quitting smoking now greatly reduces serious risk to your health.     Obesity, smoking, and sedentary lifestyle greatly increases your risk for illness    A healthy diet, regular physical exercise & weight monitoring are important for maintaining a healthy lifestyle    You may be retaining fluid if you have a history of heart failure or if you experience any of the following symptoms: Weight gain of 3 pounds or more overnight or 5 pounds in a week, increased swelling in our hands or feet or shortness of breath while lying flat in bed. Please call your doctor as soon as you notice any of these symptoms; do not wait until your next office visit. The discharge information has been reviewed with the patient. The patient verbalized understanding. Discharge medications reviewed with the patient and appropriate educational materials and side effects teaching were provided. Patient armband removed and shredded.

## 2020-10-23 NOTE — PROGRESS NOTES
Problem: Falls - Risk of  Goal: *Absence of Falls  Description: Document Chirag Rm Fall Risk and appropriate interventions in the flowsheet.   Outcome: Progressing Towards Goal  Note: Fall Risk Interventions:            Medication Interventions: Teach patient to arise slowly                   Problem: Patient Education: Go to Patient Education Activity  Goal: Patient/Family Education  Outcome: Progressing Towards Goal     Problem: Pain  Goal: *Control of Pain  Outcome: Progressing Towards Goal     Problem: Patient Education: Go to Patient Education Activity  Goal: Patient/Family Education  Outcome: Progressing Towards Goal

## 2020-10-23 NOTE — PROGRESS NOTES
1935- Pt in bed resting alert and oriented x3 denies pain at the moment. Assessement completed plan of care for the shift explained pt verbalized understanding. IVF infusing well, HOB elevated, bed low and in locked position. Call light and frequently used items within reach. 2040- Pt noted ambulating on the [address] with iv pump. Pt stated that he was stretching his legs. Bedside and Verbal shift change report given to  [address] [place] [address] (oncoming nurse) by Cameron Delgado RN (offgoing nurse). Report included the following information SBAR, Kardex, Intake/Output, MAR and Recent Results.

## 2020-10-23 NOTE — DISCHARGE SUMMARY
Discharge Summary      [facility] service    Patient ID:  Howie Boswell, 61 y.o., female  : 1960    Admit Date: 10/21/2020  Discharge Date:  10/23/2020  Length of stay: 2 day(s)    PCP:  Kris Gale MD    Chief Complaint   Patient presents with    Abdominal Pain       Discharge Diagnosis:     Hospital Problems  Date Reviewed: 10/7/2020          Codes Class Noted POA    * (Principal) Abdominal pain ICD-10-CM: R10.9  ICD-9-CM: 789.00  10/21/2020 Yes        Anemia ICD-10-CM: D64.9  ICD-9-CM: 285.9  8/10/2019 Yes        S/P small bowel resection ICD-10-CM: Z90.49  ICD-9-CM: V45.89  2019 Yes        Hypertension (Chronic) ICD-10-CM: I10  ICD-9-CM: 401.9  2016 Yes        Hyperlipidemia (Chronic) ICD-10-CM: E78.5  ICD-9-CM: 272.4  2016 Yes        GERD (gastroesophageal reflux disease) (Chronic) ICD-10-CM: K21.9  ICD-9-CM: 530.81  2016 Yes        Asthma (Chronic) ICD-10-CM: J45.909  ICD-9-CM: 493.90  2016 Yes        Type 2 diabetes mellitus ([facility] [address] (Chronic) ICD-10-CM: E11.9  ICD-9-CM: 250.00  2016 Yes              Discharge instructions:    #  Discharge Diet:            Diet: Resume previous diet  # Discharge activity and restrictions: Activity as tolerated    # Follow-up appointments: Follow-up Information     Follow up With Specialties Details Why Contact Info    Kris Gale MD Family Medicine, Internal Medicine In 1 week  [address]  [address] [facility]      Leda Minor MD Surgery In 2 weeks  [address]  [facility]  [address] [place]  [phone]              HPI on Admission (per admitting physician):  Ms. Sailaja Brown is a 61 y.o.  female who is admitted for abdomina lpains possible MITZI. Ms. Sailaja Brown with past medical history as noted below , presented to the Emergency Department today  complaining of above. Triage and ER notes noted.    ER MD wanted to admit the patient to the hospital for further management and called hospitalist to facilitate this process. Abdominal pains and some NV started 2 days ago. She is keeping po fluids water though. abd pains started on r side and mostly stayed on r side. She took po pain pills and didn't help the pain so she knew this is not something easy and report to RT. In ER CT scan revealed focal ileitis , possible SBO , her surgeon consulted , asked for npo and he will see her. She report normal bowel habits. Deny fever but has significant menopausal symptoms of hot flushes, she is not taking anything for it. She had hysterectomy and ovary removed when she was young. For further details and initial management please refer to H/P. Hospital Course:      Feeling good , no pains, no NV, no abd. Pains. Willing to go home this am.    By Problems :     #  Abdominal pains. Improved. Seen by surgery team , advanced diet. No evidence of obstruction. IVF. Control pains. Serial labs. Passing BMs. DW surgery yesterday , if more improvement in am she can go home.       #  Hypertension (6/27/2016). Control BP      # ho  Hyperlipidemia (6/27/2016)      #  GERD (gastroesophageal reflux disease) (6/27/2016).  PPI        # Asthma (6/27/2016)      #  Type 2 diabetes mellitus ([facility] [address] (6/27/2016).    Control blood sugar level.   Provide SSI, hypoglycemia protocol and frequent Accu checks.  Education .  Diabetic Diet           # S/P small bowel resection (8/1/2019)  8/2019       # ho Anemia (8/10/2019)    Discharge condition:  improved and stable    Disposition:  Home    Procedures:   * No surgery found *      Consultants: General Surgery     Physical Exam on Discharge:  Visit Vitals  BP (!) 128/96 (BP 1 Location: Right arm, BP Patient Position: At rest)   Pulse 80   Temp 98.1 °F (36.7 °C)   Resp 18   Ht 5' 4\" (1.626 m)   Wt 65.8 kg (145 lb)   SpO2 100%   BMI 24.89 kg/m²     Gen:  Appear stated age, Well-developed, well-nourished, in no acute distress  HEENT:  Head atraumatic, normocephalic , hearing intact to voice, moist mucous membranes. Neck:   Trachea midline , No apparent JVD, Supple, without masses, thyroid non-tender  Resp:  No accessory muscle use,Bilateral BS present, clear breath sounds without wheezes rales or rhonchi  Card:  No murmurs, normal S1, S2 without Gallop . No Significant lower leg peripheral edema. Abd:   No tenderness  ,   Soft,  non-distended, bowel sounds are present . Musc:  No cyanosis or clubbing. Skin:   Warm and dry . No rashes or ulcers, skin turgor is good. Neuro:  Cranial nerves are grossly intact, no clear area of focal motor weakness, follows commands appropriately. Psych:  Good insight, oriented to person, place and time, alert. Hospitalization and discharge instructions d/c in details with : patient , Surgery , Nursing/CM     Discharge medications :  Current Discharge Medication List      CONTINUE these medications which have NOT CHANGED    Details   omeprazole (PRILOSEC) 10 mg capsule take 1 capsule by mouth once daily  Qty: 90 Cap, Refills: 4    Associated Diagnoses: Gastroesophageal reflux disease without esophagitis      dapagliflozin (FARXIGA) 10 mg tab tablet Take 10 mg by mouth Every morning. metFORMIN ER (GLUCOPHAGE XR) 500 mg tablet take 2 tablets by mouth twice a day  Qty: 120 Tab, Refills: 5    Associated Diagnoses: Steroid-induced diabetes (HCC)      ondansetron (Zofran ODT) 4 mg disintegrating tablet Take 1 Tab by mouth every eight (8) hours as needed for Nausea or Vomiting. Qty: 15 Tab, Refills: 0      bisoprolol-hydroCHLOROthiazide (ZIAC) 2.5-6.25 mg per tablet take 1 tablet by mouth once daily  Qty: 90 Tab, Refills: 3    Associated Diagnoses: Essential hypertension      doxepin (SINEquan) 25 mg capsule Take 1 Cap by mouth nightly.   Qty: 90 Cap, Refills: 3    Associated Diagnoses: Insomnia secondary to chronic pain      acetaminophen (TYLENOL) 325 mg tablet Take 2 Tabs by mouth every four (4) hours as needed for Pain.  Qty: 20 Tab, Refills: 0      amLODIPine (NORVASC) 5 mg tablet Take 1 Tab by mouth daily. Qty: 30 Tab, Refills: 0    Associated Diagnoses: Essential hypertension, benign      albuterol sulfate (PROAIR RESPICLICK) 90 mcg/actuation aepb Take 2 Puffs by inhalation every four (4) hours as needed for Wheezing, Shortness of Breath or Cough. Qty: 1 Inhaler, Refills: 0      albuterol (PROVENTIL VENTOLIN) 2.5 mg /3 mL (0.083 %) nebu 3 mL by Nebulization route every six (6) hours as needed for Wheezing. Qty: 30 Nebule, Refills: 1    Associated Diagnoses: Moderate persistent asthma without complication      multivitamin (ONE A DAY) tablet Take 1 Tab by mouth daily. glucose blood VI test strips (ONETOUCH ULTRA TEST) strip Check blood glucose as directed  Qty: 100 Strip, Refills: 1    Associated Diagnoses: Type 2 diabetes mellitus with hyperglycemia, without long-term current use of insulin (HCC)                 Most Recent Labs:       Recent Labs     10/23/20  0235 10/22/20  0400 10/21/20  1201   WBC 5.2 7.1 7.4   HGB 11.0* 11.4* 13.6   HCT 35.4 36.2 42.8    325 343     Recent Labs     10/23/20  0235 10/22/20  0400 10/21/20  1201    141 137   K 4.3 4.1 4.2    106 102   CO2 29 29 28   * 104* 104*   BUN 12 12 13   CREA 0.92 0.98 1.15   CA 8.4* 8.7 9.4   MG 2.1 1.9  --    PHOS  --  3.7  --    ALB  --  2.9* 3.7   TBILI  --  0.3 0.3   ALT  --  31 38     Lab Results   Component Value Date/Time    Glucose (POC) 114 (H) 10/23/2020 08:11 AM    Glucose (POC) 193 (H) 10/22/2020 09:34 PM    Glucose (POC) 105 10/22/2020 03:42 PM    Glucose (POC) 141 (H) 10/22/2020 11:26 AM    Glucose (POC) 114 (H) 10/22/2020 07:56 AM    Glucose,  (H) 02/21/2017 01:32 PM    Glucose,  (H) 01/22/2017 02:09 PM     No results for input(s): PH, PCO2, PO2, HCO3, FIO2 in the last 72 hours. No results for input(s): INR, INREXT in the last 72 hours.     No results found for: SDES  Lab Results   Component Value Date/Time    Culture result: No significant growth, <10,000 CFU/mL 06/29/2020 02:30 PM    Culture result: No growth (<1,000 CFU/ML) 04/07/2020 09:19 PM    Culture result:  03/02/2020 05:59 PM     [id number]  COLONIES/mL  MIXED GRAM POSITIVE LINDA, PROBABLE SKIN/GENITAL CONTAMINATION. All Cardiac Markers in the last 24 hours: No results found for: CPK, CK, CKMMB, CKMB, RCK3, CKMBT, CKNDX, CKND1, JEANNE, TROPT, TROIQ, ASHUTOSH, TROPT, TNIPOC, BNP, BNPP    XR Results:  Results from Hospital Encounter encounter on 09/11/20   XR ABD (KUB)    Narrative EXAM: Portable abdomen x-ray, one view 9/11/2020    HISTORY: Nasogastric tube placement. COMPARISON: 1/14/2020. FINDINGS: A nasogastric tube is identified with its tip in the distal stomach. Scattered gas and stool are noted throughout the colon. There are no dilated  loops of large or small bowel. Contrast material is noted in the renal  collecting systems and bladder, consistent with the sequela of recent CT. Abdominal bowel gas pattern, as visualized, is unremarkable. Impression IMPRESSION:    1. Nasogastric tube with its tip in the stomach. Note: Preliminary report was provided by the on-call radiology resident. CT Results:  Results from Hospital Encounter encounter on 10/21/20   CT ABD PELV W CONT    Narrative EXAM: CT ABD PELV W CONT    CLINICAL INDICATION/HISTORY: Right lower quadrant abdominal pain    COMPARISON: 9/11/2020    TECHNIQUE:   CT of the abdomen and pelvis following intravenous contrast  administration. Coronal and sagittal reformats were generated and reviewed. One or more dose reduction techniques were used on this CT: automated exposure  control, adjustment of the mAs and/or kVp according to patient size, and  iterative reconstruction techniques. The specific techniques used on this CT  exam have been documented in the patient's electronic medical record.   Digital  Imaging and Communications in Medicine (DICOM) format image data are available  to nonaffiliated external healthcare facilities or entities on a secure, media  free, reciprocally searchable basis with patient authorization for at least a  12-month period after this study. _______________    FINDINGS:    LOWER THORAX: Mild left and right basilar dependent atelectasis. No acute  pulmonary infiltrate. No pleural effusion. No global cardiomegaly or  pericardial effusion. LIVER AND BILIARY:  Prominent diffuse hepatic steatosis. Hepatomegaly. No  suspicious liver lesions. No biliary dilation. Right upper quadrant surgical  clips of prior cholecystectomy. SPLEEN:  Normal.    PANCREAS:  Normal.    ADRENALS:  Normal.    KIDNEYS:  Simple appearing left and right parapelvic renal cysts. No acute or  suspicious renal abnormality. [place] LYMPH NODES:  No mesenteric or retroperitoneal lymphadenopathy. GI TRACT:  Small hiatal hernia. Normal caliber small and large bowel loops. Normal, air-filled appendix. Bowel anastomosis staple lines are seen within  midline and left paracentral superior pelvis. There are a few borderline  prominent air-filled bowel loops within the midline aspect of the inferior  pelvis, extending into the right lower quadrant. There also a few small bowel  loops that appear tethered to the anterior midline abdomen. Several of these  bowel loops also have mild circumferential bowel wall thickening with associated  hyperemia. There is somewhat linear mesenteric inflammatory stranding within the  anterior abdominal wall subjacent to the midline incision, suggestive of  postoperative granulation tissue/scar. There is no abrupt tapering were clearly  dilated bowel loop to suggest high-grade or complete bowel obstruction. There  are a few sigmoid colon diverticula, without morphology or secondary findings of  acute diverticulitis. PELVIC ORGANS:  Bladder is normal in appearance.   Left adnexal cyst measures 4.0  cm, simple in appearance and unchanged compared to prior study. VASCULATURE: Normal caliber lower thoracic and abdominal aorta with mild  atherosclerotic vascular calcification. OTHER:   No ascites or free intraperitoneal air. OSSEOUS STRUCTURES:  No acute osseous abnormality. Mild multilevel degenerative  disk disease and facet arthropathy. _______________      Impression IMPRESSION:    1. A few borderline prominent fluid filled bowel loops are seen within the  inferior pelvis adjacent to bowel anastomosis staple line. Findings are  suggestive of adhesions and focal ileus. No evidence of obstruction in this  vicinity. 2. A few small bowel loops also appear partially adhesed to the anterior  abdominal wall near the midline ventral incision. A few of these bowel loops  have mild bowel wall thickening, suggestive of enteritis. Currently there is  similarly no evidence of complete bowel obstruction in this vicinity. 3. Unchanged cystic left adnexal lesion. MRI Results:  Results from Hospital Encounter encounter on 07/08/19   MRI WRIST RT WO CONT    Narrative EXAM: MRI OF THE RIGHT WRIST WITHOUT CONTRAST    CLINICAL INDICATION/HISTORY: Rheumatoid arthritis, tenosynovitis    > Additional: None. COMPARISON: No prior imaging of the right wrist is available for review. > Reference Exam: None. TECHNIQUE: Coronal T2 fat-suppressed, PD, and 3-D gradient recalled imaging;  sagittal T1 and T2 fat-suppressed imaging; as well as axial PD and T2  fat-suppressed imaging obtained without intra-articular or intravenous contrast.    _______________    FINDINGS:    OSSEOUS: No evidence of fracture, stress fracture, or avascular necrosis is  present. Evidence of marrow stress reaction/osteitis. JOINTS: There is mild chondrosis of both the radiocarpal and distal radial ulnar  joints present. Small joint effusion or synovitis at the distal radial ulnar  joint. Intercarpal alignment appears within normal limits.  Type II lunate is  present. No discrete erosion demonstrated. The [facility] joint of the thumb  demonstrates mild chondrosis. TFCC: Normal intermediate signal is present in the ulnar attachment of the  triangular fibrocartilage complex. There is a normal interface with the  articular cartilage along the radial side. Small area of central tear involving  the articular disc. Ulnar styloid and foveal attachments appears intact. LIGAMENTS: Non-arthrographic appearance of both the scapholunate and  lunotriquetral ligaments appears within normal limits. EXTENSORS: There is trace fluid accumulation within the first, second and to a  lesser extent sixth extensor compartment tendons. FLEXORS: Structures within the carpal tunnel are normal appearing. Flexor  profundus and superficialis tendons are normal.  Median nerve is unremarkable. Flexor retinaculum is not thickened. GUYON'S CANAL: Structures of Guyon's tunnel to include the ulnar nerve are  within normal limits. OTHER: Multilocular ganglion and associated with the palmar and lateral aspects  of the pisiform/hamate articulation measures approximately 1.2 x 1.0 x 0.4 cm in  size.    _______________      Impression IMPRESSION:  1. Mild chondrosis of the distal radial ulnar and radiocarpal articulations  without evidence of significant osteoarthritis or erosion. Small joint effusion  or synovitis at the distal radial ulnar joint. 2. Mild chondrosis of the thumb carpometacarpal joint. 3. Small area of central tearing involving the articular disc of the triangular  fibrocartilage complex. 4. Mild extensor compartment tenosynovitis involving the first, second, and to  lesser extent sixth extensor compartment tendons. 5. Multiloculated ganglion in continuity with the pisiform/hamate articulation.        Nuclear Medicine Results:  Results from [place] encounter on 03/20/10   NM HEPATOBILIARY DUCT SCAN    Narrative Ordering MD: Mahendra Mcdaniels MD  Interpreted By: eJssica Garcia III, MD  ** FINAL **  ---------------------------------------------------------------------  INTERPRETATION:  Hepatobiliary scan  History: Abdominal pain. Findings: 6.89 mci of technetium-99m Choletec was administered   followed by serial imaging of upper abdomen for 60 minutes. The   study shows normal hepatic activity and prompt visualization of   common duct and small bowel. Gallbladder activity begins to be   visualized approximately 43 minutes into examination with increasing   gallbladder intensity over the remainder of examination. 1.63 mcg   of Kinevac was administered after which the patient experienced   extreme abdominal pain similar to prior pain. Gallbladder ejection   fraction between 10 and 20 minutes post injection estimated at 5%. IMPRESSION:  No evidence of cystic duct obstruction. Markedly decreased   gallbladder ejection fraction with severe abdominal pain elicited by   Kinevac infusion, which the patient states was similar to the   abdominal pain she has been experiencing. US Results:  Results from [place] encounter on 03/03/20   US ABD LTD    Narrative US ABD LTD    INDICATION: increasing suprapubic pain with concern for incisional hernia,    TECHNIQUE: Limited ultrasound in the clinical region of the interest in the  lower abdomen. COMPARISON: CT abdomen and pelvis 3/2/2020. FINDINGS:    Limited ultrasound along the laparotomy scar demonstrates scarring and trace  edema without drainable fluid collection or evidence of hernia. Impression IMPRESSION:    Limited ultrasound along the laparotomy scar demonstrates scarring and trace  edema without drainable fluid collection or evidence of hernia. Total discharge time 35 minutes of which more than 50 % spent on counseling and coordination of care. This document in whole or part of it has been produced using voice recognition software.  Unrecognized errors in transcription may be present. Mindi Thornton MD  Hospitalist  [facility], [facility]. medical group. Hospitalist Division.   October 23, 2020  10:13 AM

## 2020-10-26 ENCOUNTER — PATIENT OUTREACH (OUTPATIENT)
Dept: CASE MANAGEMENT | Age: 60
End: 2020-10-26

## 2020-10-26 NOTE — PROGRESS NOTES
Patient contacted regarding recent discharge and COVID-19 risk. Discussed COVID-19 related testing which was not done at this time. Test results were not done. Patient informed of results, if available? n/a    Care Transition Nurse/ Ambulatory Care Manager/ LPN Care Coordinator contacted the patient by telephone to perform post discharge assessment. Verified name and  with patient as identifiers. Patient has following risk factors of: diabetes. CTN/ACM/LPN reviewed discharge instructions, medical action plan and red flags related to discharge diagnosis. Reviewed and educated them on any new and changed medications related to discharge diagnosis. Advised obtaining a 90-day supply of all daily and as-needed medications. Advance Care Planning:   Does patient have an Advance Directive: health care decision makers on file    Education provided regarding infection prevention, and signs and symptoms of COVID-19 and when to seek medical attention with patient who verbalized understanding. Discussed exposure protocols and quarantine from [address] you at higher risk for severe illness  and given an opportunity for questions and concerns. The patient agrees to contact PCP office for questions related to their healthcare. CTN/ACM/LPN provided contact information for future reference. From CDC: Are you at higher risk for severe illness?  Wash your hands often.  Avoid close contact (6 feet, which is about two arm lengths) with people who are sick.  Put distance between yourself and other people if COVID-19 is spreading in your community.  Clean and disinfect frequently touched surfaces.  Avoid all cruise travel and non-essential air travel.  Call your healthcare professional if you have concerns about COVID-19 and your underlying condition or if you are sick.     For more information on steps you can take to protect yourself, see CDC's How to Protect Yourself Patient/family/caregiver given information for [employer] and agrees to enroll no  Patient's preferred e-mail:  n/a  Patient's preferred phone number: n/a  Based on Loop alert triggers, patient will be contacted by nurse care manager for worsening symptoms. Plan for follow-up call in 7-14 days based on severity of symptoms and risk factors.

## 2020-11-04 ENCOUNTER — HOSPITAL ENCOUNTER (EMERGENCY)
Age: 60
Discharge: HOME | End: 2020-11-04
Attending: EMERGENCY MEDICINE
Payer: COMMERCIAL

## 2020-11-04 VITALS
SYSTOLIC BLOOD PRESSURE: 118 MMHG | DIASTOLIC BLOOD PRESSURE: 77 MMHG | RESPIRATION RATE: 18 BRPM | WEIGHT: 145 LBS | HEART RATE: 94 BPM | HEIGHT: 64 IN | OXYGEN SATURATION: 99 % | TEMPERATURE: 97.8 F | BODY MASS INDEX: 24.75 KG/M2

## 2020-11-04 DIAGNOSIS — R10.84 RECURRENT GENERALIZED ABDOMINAL PAIN: Primary | ICD-10-CM

## 2020-11-04 DIAGNOSIS — R11.2 NON-INTRACTABLE VOMITING WITH NAUSEA, UNSPECIFIED VOMITING TYPE: ICD-10-CM

## 2020-11-04 LAB
ALBUMIN SERPL-MCNC: 3.3 G/DL (ref 3.4–5)
ALBUMIN/GLOB SERPL: 0.7 {RATIO} (ref 0.8–1.7)
ALP SERPL-CCNC: 115 U/L (ref 45–117)
ALT SERPL-CCNC: 31 U/L (ref 13–56)
ANION GAP SERPL CALC-SCNC: 2 MMOL/L (ref 3–18)
APPEARANCE UR: CLEAR
AST SERPL-CCNC: 22 U/L (ref 10–38)
BASOPHILS # BLD: 0 K/UL (ref 0–0.1)
BASOPHILS NFR BLD: 0 % (ref 0–2)
BILIRUB SERPL-MCNC: 0.2 MG/DL (ref 0.2–1)
BILIRUB UR QL: NEGATIVE
BUN SERPL-MCNC: 14 MG/DL (ref 7–18)
BUN/CREAT SERPL: 12 (ref 12–20)
CALCIUM SERPL-MCNC: 9.1 MG/DL (ref 8.5–10.1)
CHLORIDE SERPL-SCNC: 106 MMOL/L (ref 100–111)
CO2 SERPL-SCNC: 31 MMOL/L (ref 21–32)
COLOR UR: YELLOW
CREAT SERPL-MCNC: 1.13 MG/DL (ref 0.6–1.3)
DIFFERENTIAL METHOD BLD: ABNORMAL
EOSINOPHIL # BLD: 0.4 K/UL (ref 0–0.4)
EOSINOPHIL NFR BLD: 7 % (ref 0–5)
ERYTHROCYTE [DISTWIDTH] IN BLOOD BY AUTOMATED COUNT: 14.7 % (ref 11.6–14.5)
GLOBULIN SER CALC-MCNC: 4.6 G/DL (ref 2–4)
GLUCOSE SERPL-MCNC: 105 MG/DL (ref 74–99)
GLUCOSE UR STRIP.AUTO-MCNC: >1000 MG/DL
HCT VFR BLD AUTO: 41.7 % (ref 35–45)
HGB BLD-MCNC: 13.3 G/DL (ref 12–16)
HGB UR QL STRIP: NEGATIVE
KETONES UR QL STRIP.AUTO: NEGATIVE MG/DL
LEUKOCYTE ESTERASE UR QL STRIP.AUTO: NEGATIVE
LIPASE SERPL-CCNC: 172 U/L (ref 73–393)
LYMPHOCYTES # BLD: 2.4 K/UL (ref 0.9–3.6)
LYMPHOCYTES NFR BLD: 38 % (ref 21–52)
MCH RBC QN AUTO: 26.9 PG (ref 24–34)
MCHC RBC AUTO-ENTMCNC: 31.9 G/DL (ref 31–37)
MCV RBC AUTO: 84.2 FL (ref 74–97)
MONOCYTES # BLD: 0.5 K/UL (ref 0.05–1.2)
MONOCYTES NFR BLD: 8 % (ref 3–10)
NEUTS SEG # BLD: 2.9 K/UL (ref 1.8–8)
NEUTS SEG NFR BLD: 47 % (ref 40–73)
NITRITE UR QL STRIP.AUTO: NEGATIVE
PH UR STRIP: 6 [PH] (ref 5–8)
PLATELET # BLD AUTO: 351 K/UL (ref 135–420)
PMV BLD AUTO: 11.4 FL (ref 9.2–11.8)
POTASSIUM SERPL-SCNC: 4 MMOL/L (ref 3.5–5.5)
PROT SERPL-MCNC: 7.9 G/DL (ref 6.4–8.2)
PROT UR STRIP-MCNC: NEGATIVE MG/DL
RBC # BLD AUTO: 4.95 M/UL (ref 4.2–5.3)
SODIUM SERPL-SCNC: 139 MMOL/L (ref 136–145)
SP GR UR REFRACTOMETRY: 1.02 (ref 1–1.03)
UROBILINOGEN UR QL STRIP.AUTO: 0.2 EU/DL (ref 0.2–1)
WBC # BLD AUTO: 6.3 K/UL (ref 4.6–13.2)

## 2020-11-04 PROCEDURE — 96376 TX/PRO/DX INJ SAME DRUG ADON: CPT

## 2020-11-04 PROCEDURE — 96374 THER/PROPH/DIAG INJ IV PUSH: CPT

## 2020-11-04 PROCEDURE — 74011250636 HC RX REV CODE- 250/636: Performed by: PHYSICIAN ASSISTANT

## 2020-11-04 PROCEDURE — 80053 COMPREHEN METABOLIC PANEL: CPT

## 2020-11-04 PROCEDURE — 81003 URINALYSIS AUTO W/O SCOPE: CPT

## 2020-11-04 PROCEDURE — 74011250636 HC RX REV CODE- 250/636: Performed by: EMERGENCY MEDICINE

## 2020-11-04 PROCEDURE — 83690 ASSAY OF LIPASE: CPT

## 2020-11-04 PROCEDURE — 85025 COMPLETE CBC W/AUTO DIFF WBC: CPT

## 2020-11-04 PROCEDURE — 96361 HYDRATE IV INFUSION ADD-ON: CPT

## 2020-11-04 PROCEDURE — 96375 TX/PRO/DX INJ NEW DRUG ADDON: CPT

## 2020-11-04 PROCEDURE — 99284 EMERGENCY DEPT VISIT MOD MDM: CPT

## 2020-11-04 RX ORDER — KETOROLAC TROMETHAMINE 30 MG/ML
30 INJECTION, SOLUTION INTRAMUSCULAR; INTRAVENOUS
Status: COMPLETED | OUTPATIENT
Start: 2020-11-04 | End: 2020-11-04

## 2020-11-04 RX ORDER — ONDANSETRON 4 MG/1
4 TABLET, ORALLY DISINTEGRATING ORAL
Qty: 30 TAB | Refills: 0 | OUTPATIENT
Start: 2020-11-04 | End: 2020-11-19

## 2020-11-04 RX ORDER — MORPHINE SULFATE 4 MG/ML
4 INJECTION, SOLUTION INTRAMUSCULAR; INTRAVENOUS
Status: COMPLETED | OUTPATIENT
Start: 2020-11-04 | End: 2020-11-04

## 2020-11-04 RX ORDER — ONDANSETRON 2 MG/ML
4 INJECTION INTRAMUSCULAR; INTRAVENOUS
Status: COMPLETED | OUTPATIENT
Start: 2020-11-04 | End: 2020-11-04

## 2020-11-04 RX ORDER — MORPHINE SULFATE 2 MG/ML
2 INJECTION, SOLUTION INTRAMUSCULAR; INTRAVENOUS
Status: COMPLETED | OUTPATIENT
Start: 2020-11-04 | End: 2020-11-04

## 2020-11-04 RX ADMIN — ONDANSETRON 4 MG: 2 INJECTION INTRAMUSCULAR; INTRAVENOUS at 21:01

## 2020-11-04 RX ADMIN — SODIUM CHLORIDE 1000 ML: 900 INJECTION, SOLUTION INTRAVENOUS at 21:01

## 2020-11-04 RX ADMIN — MORPHINE SULFATE 4 MG: 4 INJECTION, SOLUTION INTRAMUSCULAR; INTRAVENOUS at 21:01

## 2020-11-04 RX ADMIN — KETOROLAC TROMETHAMINE 30 MG: 30 INJECTION, SOLUTION INTRAMUSCULAR at 22:31

## 2020-11-04 RX ADMIN — MORPHINE SULFATE 2 MG: 2 INJECTION, SOLUTION INTRAMUSCULAR; INTRAVENOUS at 22:31

## 2020-11-05 NOTE — ED TRIAGE NOTES
MId abdominal pain began last night with n/v. Hx of partial bowel obstruction. D/C from hospital on 10/23/2020.

## 2020-11-05 NOTE — DISCHARGE INSTRUCTIONS

## 2020-11-05 NOTE — ED NOTES
I have reviewed discharge instructions with the patient. The patient verbalized understanding. Patient armband removed and shredded. VSS. Pt requesting prescription for Zofran, MD notified, prescription sent to pharmacy. Pt aware, no other questions at time of discharge. Pt in NAD upon leaving ER.

## 2020-11-05 NOTE — ED PROVIDER NOTES
Jairo Wright is a 61 y.o. female with history of recurrent abdominal pain and previous abdominal surgeries with complaints of recurrent mid abdominal pain with nausea vomiting once after dinner tonight. Patient had a bit of pain yesterday that resolved and returned again tonight. She has been moving her bowels without difficulty. No blood in her stool. She denies any fever. Patient was recently admitted for a suspected partial small bowel obstruction. She has had no recent chest pain or shortness of breath. Pain is sharp and stabbing and cramping. She did not take anything for prior to arrival.  It is worse with movement and palpation. She denies any urinary symptoms. The history is provided by the patient and medical records.         Past Medical History:   Diagnosis Date    Abdominal adhesions     Adnexal cyst 7/30/2019    Left    Anemia     Asthma     Chronic pelvic pain in female 7/30/2019    Diabetes mellitus     Dyskinesia     bilateral    Essential hypertension     GERD (gastroesophageal reflux disease)     Hypertension     Menopause     Microhematuria     Rheumatoid arteritis ([address] 2018    Stool color black        Past Surgical History:   Procedure Laterality Date    COLONOSCOPY N/A 1/21/2019    COLONOSCOPY performed by Moses Garvin MD at [facility] ENDOSCOPY    HX CHOLECYSTECTOMY  6/28/10    HX COLONOSCOPY      HX ENDOSCOPY      HX HERNIA REPAIR      HX HYSTERECTOMY  1989    Fibroids    HX KNEE REPLACEMENT Left     HX KNEE REPLACEMENT Right 06/2018    HX OOPHORECTOMY  12/2000    HX ORTHOPAEDIC Right     ankle- multiple surgeries    HX SMALL BOWEL RESECTION  12/2000    HX SMALL BOWEL RESECTION  02/21/2017    Dr. Kathrine Lamb           Family History:   Problem Relation Age of Onset    Hypertension Other         parent    Breast Cancer Other 21    Heart Disease Father     Diabetes Father     Lung Disease Father     Diabetes Mother  Hypertension Other         sibling    Diabetes Other         parent    Kidney Disease Brother     Diabetes Brother     Lung Disease Brother        Social History     Socioeconomic History    Marital status:      Spouse name: Not on file    Number of children: Not on file    Years of education: Not on file    Highest education level: Not on file   Occupational History    Not on file   Social Needs    Financial resource strain: Not on file    Food insecurity     Worry: Not on file     Inability: Not on file   [employer] needs     Medical: Not on file     Non-medical: Not on file   Tobacco Use    Smoking status: Never Smoker    Smokeless tobacco: Never Used   Substance and Sexual Activity    Alcohol use: No    Drug use: No    Sexual activity: Yes     Partners: Male     Birth control/protection: None   Lifestyle    Physical activity     Days per week: Not on file     Minutes per session: Not on file    Stress: Not on file   Relationships    Social connections     Talks on phone: Not on file     Gets together: Not on file     Attends Islam service: Not on file     Active member of club or organization: Not on file     Attends meetings of clubs or organizations: Not on file     Relationship status: Not on file    Intimate partner violence     Fear of current or ex partner: Not on file     Emotionally abused: Not on file     Physically abused: Not on file     Forced sexual activity: Not on file   Other Topics Concern    Not on file   Social History Narrative    Not on file         ALLERGIES: Macrodantin [nitrofurantoin macrocrystalline] and Tape [adhesive]    Review of Systems   Constitutional: Negative for fever. HENT: Negative for sore throat and trouble swallowing. Eyes: Negative for visual disturbance. Respiratory: Negative for cough and shortness of breath. Cardiovascular: Negative for chest pain. Gastrointestinal: Positive for abdominal pain.    Genitourinary: Negative for difficulty urinating and dysuria. Musculoskeletal: Negative for gait problem. Skin: Negative for rash. Allergic/Immunologic: Negative for immunocompromised state. Neurological: Negative for syncope. Psychiatric/Behavioral: Positive for sleep disturbance. Vitals:    11/04/20 2215 11/04/20 2230 11/04/20 2245 11/04/20 2300   BP: 122/85 133/85 129/73 128/77   Pulse:       Resp:       Temp:       SpO2: 99% 100% 100% 99%   Weight:       Height:                Physical Exam  Vitals signs and nursing note reviewed. Constitutional:       General: She is in acute distress. Appearance: She is well-developed. She is ill-appearing. She is not toxic-appearing or diaphoretic. HENT:      Head: Normocephalic and atraumatic. Right Ear: External ear normal.      Left Ear: External ear normal.      Nose: Nose normal.      Mouth/Throat:      Pharynx: Uvula midline. Eyes:      General: No scleral icterus. Conjunctiva/sclera: Conjunctivae normal.   Neck:      Musculoskeletal: Neck supple. Cardiovascular:      Rate and Rhythm: Normal rate and regular rhythm. Heart sounds: Normal heart sounds. Pulmonary:      Effort: Pulmonary effort is normal.      Breath sounds: Normal breath sounds. Abdominal:      General: Abdomen is protuberant. Palpations: Abdomen is soft. There is no hepatomegaly, splenomegaly or pulsatile mass. Tenderness: There is abdominal tenderness in the epigastric area and periumbilical area. There is guarding. There is no right CVA tenderness, left CVA tenderness or rebound. Negative signs include Feldman's sign and McBurney's sign. Musculoskeletal:      Right lower leg: No edema. Left lower leg: No edema. Skin:     General: Skin is warm and dry. Capillary Refill: Capillary refill takes less than 2 seconds. Neurological:      Mental Status: She is alert and oriented to person, place, and time.       Gait: Gait normal.   Psychiatric: Behavior: Behavior normal.          MDM       Procedures    Vitals:  Patient Vitals for the past 12 hrs:   Temp Pulse Resp BP SpO2   11/04/20 2300    128/77 99 %   11/04/20 2245    129/73 100 %   11/04/20 2230    133/85 100 %   11/04/20 2215    122/85 99 %   11/04/20 2200    130/78 100 %   11/04/20 2145    128/75 100 %   11/04/20 2130    131/79 98 %   11/04/20 2115    133/81 100 %   11/04/20 2108     95 %   11/04/20 2107    (!) 144/87    11/04/20 2100    (!) 151/89 100 %   11/04/20 2050     100 %   11/04/20 2049    (!) 141/92    11/04/20 1935 97.8 °F (36.6 °C) 94 18 (!) 152/100 99 %         Medications ordered:   Medications   ondansetron (ZOFRAN) injection 4 mg (4 mg IntraVENous Given 11/4/20 2101)   sodium chloride 0.9 % bolus infusion 1,000 mL (0 mL IntraVENous IV Completed 11/4/20 2201)   morphine injection 4 mg (4 mg IntraVENous Given 11/4/20 2101)   ketorolac (TORADOL) injection 30 mg (30 mg IntraVENous Given 11/4/20 2231)   morphine injection 2 mg (2 mg IntraVENous Given 11/4/20 2231)         Lab findings:  Recent Results (from the past 12 hour(s))   METABOLIC PANEL, COMPREHENSIVE    Collection Time: 11/04/20  9:03 PM   Result Value Ref Range    Sodium 139 136 - 145 mmol/L    Potassium 4.0 3.5 - 5.5 mmol/L    Chloride 106 100 - 111 mmol/L    CO2 31 21 - 32 mmol/L    Anion gap 2 (L) 3.0 - 18 mmol/L    Glucose 105 (H) 74 - 99 mg/dL    BUN 14 7.0 - 18 MG/DL    Creatinine 1.13 0.6 - 1.3 MG/DL    BUN/Creatinine ratio 12 12 - 20      GFR est AA 60 (L) >60 ml/min/1.73m2    GFR est non-AA 49 (L) >60 ml/min/1.73m2    Calcium 9.1 8.5 - 10.1 MG/DL    Bilirubin, total 0.2 0.2 - 1.0 MG/DL    ALT (SGPT) 31 13 - 56 U/L    AST (SGOT) 22 10 - 38 U/L    Alk.  phosphatase 115 45 - 117 U/L    Protein, total 7.9 6.4 - 8.2 g/dL    Albumin 3.3 (L) 3.4 - 5.0 g/dL    Globulin 4.6 (H) 2.0 - 4.0 g/dL    A-G Ratio 0.7 (L) 0.8 - 1.7     LIPASE    Collection Time: 11/04/20  9:03 PM   Result Value Ref Range    Lipase 172 73 - 393 U/L   CBC WITH AUTOMATED DIFF    Collection Time: 11/04/20  9:03 PM   Result Value Ref Range    WBC 6.3 4.6 - 13.2 K/uL    RBC 4.95 4.20 - 5.30 M/uL    HGB 13.3 12.0 - 16.0 g/dL    HCT 41.7 35.0 - 45.0 %    MCV 84.2 74.0 - 97.0 FL    MCH 26.9 24.0 - 34.0 PG    MCHC 31.9 31.0 - 37.0 g/dL    RDW 14.7 (H) 11.6 - 14.5 %    PLATELET 229 004 - 965 K/uL    MPV 11.4 9.2 - 11.8 FL    NEUTROPHILS 47 40 - 73 %    LYMPHOCYTES 38 21 - 52 %    MONOCYTES 8 3 - 10 %    EOSINOPHILS 7 (H) 0 - 5 %    BASOPHILS 0 0 - 2 %    ABS. NEUTROPHILS 2.9 1.8 - 8.0 K/UL    ABS. LYMPHOCYTES 2.4 0.9 - 3.6 K/UL    ABS. MONOCYTES 0.5 0.05 - 1.2 K/UL    ABS. EOSINOPHILS 0.4 0.0 - 0.4 K/UL    ABS. BASOPHILS 0.0 0.0 - 0.1 K/UL    DF AUTOMATED     URINALYSIS W/ RFLX MICROSCOPIC    Collection Time: 11/04/20  9:06 PM   Result Value Ref Range    Color YELLOW      Appearance CLEAR      Specific gravity 1.021 1.005 - 1.030      pH (UA) 6.0 5.0 - 8.0      Protein Negative NEG mg/dL    Glucose >1,000 (A) NEG mg/dL    Ketone Negative NEG mg/dL    Bilirubin Negative NEG      Blood Negative NEG      Urobilinogen 0.2 0.2 - 1.0 EU/dL    Nitrites Negative NEG      Leukocyte Esterase Negative NEG         EKG interpretation by ED Physician:    Pulse ox interpretation: 100% room air, normal    X-Ray, CT or other radiology findings or impressions:  No orders to display       Progress notes, Consult notes or additional Procedure notes:   Pain is well controlled. Abdomen is soft. Do not feel patient requires repeat imaging or other work-up. .    I have discussed with patient and/or family/sig other the results, interpretation of any imaging if performed, suspected diagnosis and treatment plan to include instructions regarding the diagnoses listed to which understanding was expressed with all questions answered      Reevaluation of patient:   stable    Disposition:  Diagnosis:   1. Recurrent generalized abdominal pain    2.  Non-intractable vomiting with nausea, unspecified vomiting type        Disposition: home    Follow-up Information     Follow up With Specialties Details Why Contact Info    Suly Beltre MD Family Medicine, Internal Medicine Schedule an appointment as soon as possible for a visit  [id number] [facility]  [address] [place] [id number]  [phone]      [place] [address] DEPT Emergency Medicine  If symptoms worsen [address]  [phone]            Patient's Medications   Start Taking    No medications on file   Continue Taking    ACETAMINOPHEN (TYLENOL) 325 MG TABLET    Take 2 Tabs by mouth every four (4) hours as needed for Pain. ALBUTEROL (PROVENTIL VENTOLIN) 2.5 MG /3 ML (0.083 %) NEBU    3 mL by Nebulization route every six (6) hours as needed for Wheezing. ALBUTEROL SULFATE (PROAIR RESPICLICK) 90 MCG/ACTUATION AEPB    Take 2 Puffs by inhalation every four (4) hours as needed for Wheezing, Shortness of Breath or Cough. AMLODIPINE (NORVASC) 5 MG TABLET    Take 1 Tab by mouth daily. BISOPROLOL-HYDROCHLOROTHIAZIDE (ZIAC) 2.5-6.25 MG PER TABLET    take 1 tablet by mouth once daily    DAPAGLIFLOZIN (FARXIGA) 10 MG TAB TABLET    Take 10 mg by mouth Every morning. DOXEPIN (SINEQUAN) 25 MG CAPSULE    Take 1 Cap by mouth nightly. GLUCOSE BLOOD VI TEST STRIPS (ONETOUCH ULTRA TEST) STRIP    Check blood glucose as directed    METFORMIN ER (GLUCOPHAGE XR) 500 MG TABLET    take 2 tablets by mouth twice a day    MULTIVITAMIN (ONE A DAY) TABLET    Take 1 Tab by mouth daily. OMEPRAZOLE (PRILOSEC) 10 MG CAPSULE    take 1 capsule by mouth once daily    ONDANSETRON (ZOFRAN ODT) 4 MG DISINTEGRATING TABLET    Take 1 Tab by mouth every eight (8) hours as needed for Nausea or Vomiting.    These Medications have changed    No medications on file   Stop Taking    No medications on file

## 2020-11-06 ENCOUNTER — HOSPITAL ENCOUNTER (EMERGENCY)
Age: 60
Discharge: HOME | End: 2020-11-06
Attending: EMERGENCY MEDICINE
Payer: COMMERCIAL

## 2020-11-06 ENCOUNTER — APPOINTMENT (OUTPATIENT)
Dept: CT IMAGING | Age: 60
End: 2020-11-06
Attending: PHYSICIAN ASSISTANT
Payer: COMMERCIAL

## 2020-11-06 VITALS
TEMPERATURE: 98.1 F | HEART RATE: 79 BPM | BODY MASS INDEX: 25.75 KG/M2 | WEIGHT: 150 LBS | RESPIRATION RATE: 20 BRPM | OXYGEN SATURATION: 99 % | SYSTOLIC BLOOD PRESSURE: 129 MMHG | DIASTOLIC BLOOD PRESSURE: 80 MMHG

## 2020-11-06 DIAGNOSIS — R73.9 HYPERGLYCEMIA: ICD-10-CM

## 2020-11-06 DIAGNOSIS — R10.84 ABDOMINAL PAIN, GENERALIZED: Primary | ICD-10-CM

## 2020-11-06 DIAGNOSIS — R11.2 NON-INTRACTABLE VOMITING WITH NAUSEA, UNSPECIFIED VOMITING TYPE: ICD-10-CM

## 2020-11-06 LAB
ALBUMIN SERPL-MCNC: 3.3 G/DL (ref 3.4–5)
ALBUMIN/GLOB SERPL: 0.7 {RATIO} (ref 0.8–1.7)
ALP SERPL-CCNC: 120 U/L (ref 45–117)
ALT SERPL-CCNC: 31 U/L (ref 13–56)
ANION GAP SERPL CALC-SCNC: 7 MMOL/L (ref 3–18)
AST SERPL-CCNC: 28 U/L (ref 10–38)
BASOPHILS # BLD: 0 K/UL (ref 0–0.1)
BASOPHILS NFR BLD: 0 % (ref 0–2)
BILIRUB SERPL-MCNC: <0.1 MG/DL (ref 0.2–1)
BUN SERPL-MCNC: 13 MG/DL (ref 7–18)
BUN/CREAT SERPL: 11 (ref 12–20)
CALCIUM SERPL-MCNC: 9.7 MG/DL (ref 8.5–10.1)
CHLORIDE SERPL-SCNC: 104 MMOL/L (ref 100–111)
CO2 SERPL-SCNC: 27 MMOL/L (ref 21–32)
CREAT SERPL-MCNC: 1.16 MG/DL (ref 0.6–1.3)
DIFFERENTIAL METHOD BLD: ABNORMAL
EOSINOPHIL # BLD: 0.3 K/UL (ref 0–0.4)
EOSINOPHIL NFR BLD: 6 % (ref 0–5)
ERYTHROCYTE [DISTWIDTH] IN BLOOD BY AUTOMATED COUNT: 14.8 % (ref 11.6–14.5)
GLOBULIN SER CALC-MCNC: 4.8 G/DL (ref 2–4)
GLUCOSE SERPL-MCNC: 200 MG/DL (ref 74–99)
HCT VFR BLD AUTO: 40.6 % (ref 35–45)
HGB BLD-MCNC: 12.5 G/DL (ref 12–16)
LIPASE SERPL-CCNC: 150 U/L (ref 73–393)
LYMPHOCYTES # BLD: 1.7 K/UL (ref 0.9–3.6)
LYMPHOCYTES NFR BLD: 30 % (ref 21–52)
MCH RBC QN AUTO: 25.9 PG (ref 24–34)
MCHC RBC AUTO-ENTMCNC: 30.8 G/DL (ref 31–37)
MCV RBC AUTO: 84.2 FL (ref 74–97)
MONOCYTES # BLD: 0.4 K/UL (ref 0.05–1.2)
MONOCYTES NFR BLD: 7 % (ref 3–10)
NEUTS SEG # BLD: 3.2 K/UL (ref 1.8–8)
NEUTS SEG NFR BLD: 57 % (ref 40–73)
PLATELET # BLD AUTO: 343 K/UL (ref 135–420)
PMV BLD AUTO: 11 FL (ref 9.2–11.8)
POTASSIUM SERPL-SCNC: 4.2 MMOL/L (ref 3.5–5.5)
PROT SERPL-MCNC: 8.1 G/DL (ref 6.4–8.2)
RBC # BLD AUTO: 4.82 M/UL (ref 4.2–5.3)
SODIUM SERPL-SCNC: 138 MMOL/L (ref 136–145)
WBC # BLD AUTO: 5.6 K/UL (ref 4.6–13.2)

## 2020-11-06 PROCEDURE — 96361 HYDRATE IV INFUSION ADD-ON: CPT

## 2020-11-06 PROCEDURE — 96374 THER/PROPH/DIAG INJ IV PUSH: CPT

## 2020-11-06 PROCEDURE — 80053 COMPREHEN METABOLIC PANEL: CPT

## 2020-11-06 PROCEDURE — 96375 TX/PRO/DX INJ NEW DRUG ADDON: CPT

## 2020-11-06 PROCEDURE — 99284 EMERGENCY DEPT VISIT MOD MDM: CPT

## 2020-11-06 PROCEDURE — 74011250636 HC RX REV CODE- 250/636: Performed by: PHYSICIAN ASSISTANT

## 2020-11-06 PROCEDURE — 74177 CT ABD & PELVIS W/CONTRAST: CPT

## 2020-11-06 PROCEDURE — 74011000636 HC RX REV CODE- 636: Performed by: EMERGENCY MEDICINE

## 2020-11-06 PROCEDURE — 83690 ASSAY OF LIPASE: CPT

## 2020-11-06 PROCEDURE — 85025 COMPLETE CBC W/AUTO DIFF WBC: CPT

## 2020-11-06 RX ORDER — MORPHINE SULFATE 4 MG/ML
4 INJECTION, SOLUTION INTRAMUSCULAR; INTRAVENOUS
Status: COMPLETED | OUTPATIENT
Start: 2020-11-06 | End: 2020-11-06

## 2020-11-06 RX ORDER — ONDANSETRON 2 MG/ML
4 INJECTION INTRAMUSCULAR; INTRAVENOUS
Status: COMPLETED | OUTPATIENT
Start: 2020-11-06 | End: 2020-11-06

## 2020-11-06 RX ORDER — ONDANSETRON 4 MG/1
TABLET, ORALLY DISINTEGRATING ORAL
Qty: 10 TAB | Refills: 0 | Status: SHIPPED | OUTPATIENT
Start: 2020-11-06 | End: 2020-11-19

## 2020-11-06 RX ADMIN — MORPHINE SULFATE 4 MG: 4 INJECTION, SOLUTION INTRAMUSCULAR; INTRAVENOUS at 16:05

## 2020-11-06 RX ADMIN — IOPAMIDOL 99 ML: 612 INJECTION, SOLUTION INTRAVENOUS at 16:25

## 2020-11-06 RX ADMIN — ONDANSETRON 4 MG: 2 INJECTION INTRAMUSCULAR; INTRAVENOUS at 16:03

## 2020-11-06 RX ADMIN — SODIUM CHLORIDE 1000 ML: 900 INJECTION, SOLUTION INTRAVENOUS at 16:08

## 2020-11-06 NOTE — DISCHARGE INSTRUCTIONS
Patient Education        Abdominal Pain: Care Instructions  Your Care Instructions     Abdominal pain has many possible causes. Some aren't serious and get better on their own in a few days. Others need more testing and treatment. If your pain continues or gets worse, you need to be rechecked and may need more tests to find out what is wrong. You may need surgery to correct the problem. Don't ignore new symptoms, such as fever, nausea and vomiting, urination problems, pain that gets worse, and dizziness. These may be signs of a more serious problem. Your doctor may have recommended a follow-up visit in the next 8 to 12 hours. If you are not getting better, you may need more tests or treatment. The doctor has checked you carefully, but problems can develop later. If you notice any problems or new symptoms, get medical treatment right away. Follow-up care is a key part of your treatment and safety. Be sure to make and go to all appointments, and call your doctor if you are having problems. It's also a good idea to know your test results and keep a list of the medicines you take. How can you care for yourself at home? · Rest until you feel better. · To prevent dehydration, drink plenty of fluids, enough so that your urine is light yellow or clear like water. Choose water and other caffeine-free clear liquids until you feel better. If you have kidney, heart, or liver disease and have to limit fluids, talk with your doctor before you increase the amount of fluids you drink. · If your stomach is upset, eat mild foods, such as rice, dry toast or crackers, bananas, and applesauce. Try eating several small meals instead of two or three large ones. · Wait until 48 hours after all symptoms have gone away before you have spicy foods, alcohol, and drinks that contain caffeine. · Do not eat foods that are high in fat. · Avoid anti-inflammatory medicines such as aspirin, ibuprofen (Advil, Motrin), and naproxen (Aleve). These can cause stomach upset. Talk to your doctor if you take daily aspirin for another health problem. When should you call for help? Call 911 anytime you think you may need emergency care. For example, call if:    · You passed out (lost consciousness).     · You pass maroon or very bloody stools.     · You vomit blood or what looks like coffee grounds.     · You have new, severe belly pain. Call your doctor now or seek immediate medical care if:    · Your pain gets worse, especially if it becomes focused in one area of your belly.     · You have a new or higher fever.     · Your stools are black and look like tar, or they have streaks of blood.     · You have unexpected vaginal bleeding.     · You have symptoms of a urinary tract infection. These may include:  ? Pain when you urinate. ? Urinating more often than usual.  ? Blood in your urine.     · You are dizzy or lightheaded, or you feel like you may faint. Watch closely for changes in your health, and be sure to contact your doctor if:    · You are not getting better after 1 day (24 hours). Where can you learn more? Go to http://www.gray.com/  Enter Q946 in the search box to learn more about \"Abdominal Pain: Care Instructions. \"  Current as of: June 26, 2019               Content Version: 12.6  © 6969-0564 [id number] Care instructions adapted under license by [employer] (which disclaims liability or warranty for this information). If you have questions about a medical condition or this instruction, always ask your healthcare professional. [place] [address] any warranty or liability for your use of this information. Patient Education        Nausea and Vomiting: Care Instructions  Your Care Instructions     When you are nauseated, you may feel weak and sweaty and notice a lot of saliva in your mouth. Nausea often leads to vomiting.  Most of the time you do not need to worry about nausea and vomiting, but they can be signs of other illnesses. Two common causes of nausea and vomiting are stomach flu and food poisoning. Nausea and vomiting from viral stomach flu will usually start to improve within 24 hours. Nausea and vomiting from food poisoning may last from 12 to 48 hours. The doctor has checked you carefully, but problems can develop later. If you notice any problems or new symptoms, get medical treatment right away. Follow-up care is a key part of your treatment and safety. Be sure to make and go to all appointments, and call your doctor if you are having problems. It's also a good idea to know your test results and keep a list of the medicines you take. How can you care for yourself at home? · To prevent dehydration, drink plenty of fluids, enough so that your urine is light yellow or clear like water. Choose water and other caffeine-free clear liquids until you feel better. If you have kidney, heart, or liver disease and have to limit fluids, talk with your doctor before you increase the amount of fluids you drink. · Rest in bed until you feel better. · When you are able to eat, try clear soups, mild foods, and liquids until all symptoms are gone for 12 to 48 hours. Other good choices include dry toast, crackers, cooked cereal, and gelatin dessert, such as Jell-O. When should you call for help? Call 911 anytime you think you may need emergency care. For example, call if:    · You passed out (lost consciousness). Call your doctor now or seek immediate medical care if:    · You have symptoms of dehydration, such as:  ? Dry eyes and a dry mouth. ? Passing only a little dark urine. ? Feeling thirstier than usual.     · You have new or worsening belly pain.     · You have a new or higher fever.     · You vomit blood or what looks like coffee grounds.    Watch closely for changes in your health, and be sure to contact your doctor if:    · You have ongoing nausea and vomiting.     · Your vomiting is getting worse.     · Your vomiting lasts longer than 2 days.     · You are not getting better as expected. Where can you learn more? Go to http://betzy-didi.info/  Enter H591 in the search box to learn more about \"Nausea and Vomiting: Care Instructions. \"  Current as of: June 26, 2019               Content Version: 12.6  © 9891-3377 [employer]. Care instructions adapted under license by [employer] (which disclaims liability or warranty for this information). If you have questions about a medical condition or this instruction, always ask your healthcare professional. [place] [address] any warranty or liability for your use of this information.

## 2020-11-06 NOTE — ED PROVIDER NOTES
[facility] EMERGENCY DEPT    Date: 11/6/2020  Patient Name: Dale Arguello    History of Presenting Illness     Chief Complaint   Patient presents with    Abdominal Pain    Vomiting     61 y.o. female with a past medical history of HTN, Asthma, GERD, HTN, and Bowel obstructions presents the ED complaining of diffuse abdominal pain and distention for the past 2 days. She also notes having some intermittent nausea. States that she is having some diarrhea, more watery. Patient reports a history of same and says it feels similar to her prior obstructions, the last one of which was 10/21, at which time she was admitted but did not have surgical intervention. Patient denies any fever, chills, other symptoms at this time. Patient has a history of hysterectomy, Melissa cystectomy, and small bowel resection. Patient denies any other associated signs or symptoms. Patient denies any other complaints. Nursing notes regarding the HPI and triage nursing notes were reviewed. Prior medical records were reviewed. Current Outpatient Medications   Medication Sig Dispense Refill    ondansetron (Zofran ODT) 4 mg disintegrating tablet Take 1 tablets every 6-8 hours as needed for nausea and vomiting. 10 Tab 0    ondansetron (Zofran ODT) 4 mg disintegrating tablet Take 1 Tab by mouth every eight (8) hours as needed for Nausea or Vomiting. 30 Tab 0    omeprazole (PRILOSEC) 10 mg capsule take 1 capsule by mouth once daily 90 Cap 4    dapagliflozin (FARXIGA) 10 mg tab tablet Take 10 mg by mouth Every morning.  metFORMIN ER (GLUCOPHAGE XR) 500 mg tablet take 2 tablets by mouth twice a day 120 Tab 5    bisoprolol-hydroCHLOROthiazide (ZIAC) 2.5-6.25 mg per tablet take 1 tablet by mouth once daily 90 Tab 3    doxepin (SINEquan) 25 mg capsule Take 1 Cap by mouth nightly. 90 Cap 3    acetaminophen (TYLENOL) 325 mg tablet Take 2 Tabs by mouth every four (4) hours as needed for Pain.  20 Tab 0    amLODIPine (NORVASC) 5 mg tablet Take 1 Tab by mouth daily. 30 Tab 0    albuterol sulfate (PROAIR RESPICLICK) 90 mcg/actuation aepb Take 2 Puffs by inhalation every four (4) hours as needed for Wheezing, Shortness of Breath or Cough. 1 Inhaler 0    albuterol (PROVENTIL VENTOLIN) 2.5 mg /3 mL (0.083 %) nebu 3 mL by Nebulization route every six (6) hours as needed for Wheezing. 30 Nebule 1    multivitamin (ONE A DAY) tablet Take 1 Tab by mouth daily.       glucose blood VI test strips (ONETOUCH ULTRA TEST) strip Check blood glucose as directed 100 Strip 1       Past History     Past Medical History:  Past Medical History:   Diagnosis Date    Abdominal adhesions     Adnexal cyst 7/30/2019    Left    Anemia     Asthma     Chronic pelvic pain in female 7/30/2019    Diabetes mellitus     Dyskinesia     bilateral    Essential hypertension     GERD (gastroesophageal reflux disease)     Hypertension     Menopause     Microhematuria     Rheumatoid arteritis ([address] 2018    Stool color black        Past Surgical History:  Past Surgical History:   Procedure Laterality Date    COLONOSCOPY N/A 1/21/2019    COL[facility] ENDOSCOPY    HX CHOLECYSTECTOMY  6/28/10    HX COLONOSCOPY      HX ENDOSCOPY      HX HERNIA REPAIR      HX HYSTERECTOMY  1989    Fibroids    HX KNEE REPLACEMENT Left     HX KNEE REPLACEMENT Right 06/2018    HX OOPHORECTOMY  12/2000    HX ORTHOPAEDIC Right     ankle- multiple surgeries    HX SMALL BOWEL RESECTION  12/2000    HX SMALL BOWEL RESECTION  02/21/2017    Dr. Rose Leaver         Family History:  Family History   Problem Relation Age of Onset    Hypertension Other         parent    Breast Cancer Other 21    Heart Disease Father     Diabetes Father     Lung Disease Father     Diabetes Mother     Hypertension Other         sibling    Diabetes Other         parent    Kidney Disease Brother     Diabetes Brother     Lung Disease Brother        Social History:  Social History     Tobacco Use    Smoking status: Never Smoker    Smokeless tobacco: Never Used   Substance Use Topics    Alcohol use: No    Drug use: No       Allergies: Allergies   Allergen Reactions    Macrodantin [Nitrofurantoin Macrocrystalline] Itching and Other (comments)    Tape [Adhesive] Other (comments)     Paper tape-- feels like burning skin  Burned skin when had wound vac       Patient's primary care provider (as noted in EPIC):  Harshad Infante MD    Review of Systems   Constitutional:  Denies malaise, fever, chills. Cardiac:  Denies chest pain or palpitations. Respiratory:  Denies cough, wheezing, difficulty breathing, shortness of breath. GI/ABD:  + abd pain, distension, nausea, vomiting, diarrhea. :  Denies injury, pain, dysuria or urgency. Back:  Denies injury or pain. Skin: Denies injury, rash, itching or skin changes. All other systems negative as reviewed. Visit Vitals  /80   Pulse 79   Temp 98.1 °F (36.7 °C)   Resp 20   Wt 68 kg (150 lb)   SpO2 99%   BMI 25.75 kg/m²       Patient Vitals for the past 12 hrs:   Temp Pulse Resp BP SpO2   11/06/20 1730    129/80 99 %   11/06/20 1700    128/79 100 %   11/06/20 1600    132/81 99 %   11/06/20 1530    136/81 99 %   11/06/20 1412 98.1 °F (36.7 °C) 79 20 131/86 99 %       PHYSICAL EXAM:    CONSTITUTIONAL:  Alert, in no apparent distress;  well developed;  well nourished. HEAD:  Normocephalic, atraumatic. EYES:  EOMI. Non-icteric sclera. Normal conjunctiva. ENTM:  Mouth: mucous membranes moist.  NECK: Supple  RESPIRATORY:  Chest clear, equal breath sounds, good air movement. Without wheezes, rhonchi or rales. CARDIOVASCULAR:  Regular rate and rhythm. No murmurs, rubs, or gallops. GI:  Normal bowel sounds, abdomen soft, distended moderately, with tenderness to palpation throughout. Mild guarding present. BACK:  Non-tender.   UPPER EXT:  Normal inspection. NEURO:  Moves all four extremities, and grossly normal motor exam.  SKIN:  No rashes;  Normal for age. PSYCH:  Alert and normal affect. DIFFERENTIAL DIAGNOSES/ MEDICAL DECISION MAKING:  Gastritis, gerd, peptic ulcer disease, cholecystitis, pancreatitis, gastroenteritis, hepatitis, constipation related pain, appendicitis pain, diverticulitis, urinary tract infection, obstruction, abdominal wall pain,  or combination of the above versus many other processes. Recent Results (from the past 12 hour(s))   CBC WITH AUTOMATED DIFF    Collection Time: 11/06/20  2:28 PM   Result Value Ref Range    WBC 5.6 4.6 - 13.2 K/uL    RBC 4.82 4.20 - 5.30 M/uL    HGB 12.5 12.0 - 16.0 g/dL    HCT 40.6 35.0 - 45.0 %    MCV 84.2 74.0 - 97.0 FL    MCH 25.9 24.0 - 34.0 PG    MCHC 30.8 (L) 31.0 - 37.0 g/dL    RDW 14.8 (H) 11.6 - 14.5 %    PLATELET 675 886 - 918 K/uL    MPV 11.0 9.2 - 11.8 FL    NEUTROPHILS 57 40 - 73 %    LYMPHOCYTES 30 21 - 52 %    MONOCYTES 7 3 - 10 %    EOSINOPHILS 6 (H) 0 - 5 %    BASOPHILS 0 0 - 2 %    ABS. NEUTROPHILS 3.2 1.8 - 8.0 K/UL    ABS. LYMPHOCYTES 1.7 0.9 - 3.6 K/UL    ABS. MONOCYTES 0.4 0.05 - 1.2 K/UL    ABS. EOSINOPHILS 0.3 0.0 - 0.4 K/UL    ABS. BASOPHILS 0.0 0.0 - 0.1 K/UL    DF AUTOMATED     METABOLIC PANEL, COMPREHENSIVE    Collection Time: 11/06/20  2:28 PM   Result Value Ref Range    Sodium 138 136 - 145 mmol/L    Potassium 4.2 3.5 - 5.5 mmol/L    Chloride 104 100 - 111 mmol/L    CO2 27 21 - 32 mmol/L    Anion gap 7 3.0 - 18 mmol/L    Glucose 200 (H) 74 - 99 mg/dL    BUN 13 7.0 - 18 MG/DL    Creatinine 1.16 0.6 - 1.3 MG/DL    BUN/Creatinine ratio 11 (L) 12 - 20      GFR est AA 58 (L) >60 ml/min/1.73m2    GFR est non-AA 48 (L) >60 ml/min/1.73m2    Calcium 9.7 8.5 - 10.1 MG/DL    Bilirubin, total <0.1 (L) 0.2 - 1.0 MG/DL    ALT (SGPT) 31 13 - 56 U/L    AST (SGOT) 28 10 - 38 U/L    Alk.  phosphatase 120 (H) 45 - 117 U/L    Protein, total 8.1 6.4 - 8.2 g/dL    Albumin 3.3 (L) 3.4 - 5.0 g/dL    Globulin 4.8 (H) 2.0 - 4.0 g/dL    A-G Ratio 0.7 (L) 0.8 - 1.7     LIPASE    Collection Time: 11/06/20  2:28 PM   Result Value Ref Range    Lipase 150 73 - 393 U/L      Ct Abd Pelv W Cont    Result Date: 11/6/2020  EXAM: CT of the abdomen and pelvis INDICATION: Abdominal pain. COMPARISON: None. TECHNIQUE: Axial CT imaging of the abdomen and pelvis was performed with intravenous contrast. Multiplanar reformats were generated. One or more dose reduction techniques were used on this CT: automated exposure control, adjustment of the mAs and/or kVp according to patient size, and iterative reconstruction techniques. The specific techniques used on this CT exam have been documented in the patient's electronic medical record. Digital Imaging and Communications in Medicine (DICOM) format image data are available to nonaffiliated external healthcare facilities or entities on a secure, media free, reciprocally searchable basis with patient authorization for at least a 12-month period after this study. . _______________ FINDINGS: LOWER CHEST: Unremarkable. LIVER, BILIARY: Liver is normal. No biliary dilation. Gallbladder is absent. PANCREAS: Normal. SPLEEN: Normal. ADRENALS: Normal. KIDNEYS/URETERS/BLADDER: No acute finding. . Left peripelvic cysts again noted. PELVIC ORGANS: Stable left adnexal cystic lesion. Delwyn Scarce VASCULATURE: Unremarkable LYMPH NODES: No enlarged lymph nodes. GASTROINTESTINAL TRACT: No bowel dilation or wall thickening. Small bowel anastomosis in the mid abdomen again demonstrated. Appendix within normal limits. BONES: No acute or aggressive osseous abnormalities identified. OTHER: None. _______________     IMPRESSION: No acute intra-abdominal pathology identified.       IMPRESSION AND MEDICAL DECISION MAKING:  Based upon the patient's presentation with noted HPI and PE, along with the work up done in the emergency department, I believe that the patient is having abdominal pain of uncertain etiology, possibly viral.  No sign of bowel obstruction. Labs look well, patient otherwise looks well and was able to sleep throughout her ED visit. However, I do believe that the patient is stable and can be discharged home with further outpatient evaluation of the abdominal pain by the patient's primary doctor and gastroenterology. Rx provided for Zofran, patient may take Tylenol at home for pain. Strict instructions to return for any acute worsening. Diagnosis:   1. Abdominal pain, generalized    2. Non-intractable vomiting with nausea, unspecified vomiting type    3. Hyperglycemia      Disposition: Discharge    Follow-up Information     Follow up With Specialties Details Why Contact Info    Gastroenterology [address]  In 3 days  [address] [address]  [phone]    Brigette Dejesus MD Family Medicine, Internal Medicine In 3 days  [address] [facility]  [address] [place] [id number]  [phone]      [facility] EMERGENCY DEPT Emergency Medicine  If symptoms worsen [address]  [phone]          Discharge Medication List as of 11/6/2020  5:37 PM      START taking these medications    Details   !! ondansetron (Zofran ODT) 4 mg disintegrating tablet Take 1 tablets every 6-8 hours as needed for nausea and vomiting., Print, Disp-10 Tab,R-0       !! - Potential duplicate medications found. Please discuss with provider.       CONTINUE these medications which have NOT CHANGED    Details   !! ondansetron (Zofran ODT) 4 mg disintegrating tablet Take 1 Tab by mouth every eight (8) hours as needed for Nausea or Vomiting., Normal, Disp-30 Tab,R-0      omeprazole (PRILOSEC) 10 mg capsule take 1 capsule by mouth once daily, Normal, Disp-90 Cap,R-4      dapagliflozin (FARXIGA) 10 mg tab tablet Take 10 mg by mouth Every morning., Historical Med      metFORMIN ER (GLUCOPHAGE XR) 500 mg tablet take 2 tablets by mouth twice a day, Normal, Disp-120 Tab,R-5      bisoprolol-hydroCHLOROthiazide (ZIAC) 2.5-6.25 mg per tablet take 1 tablet by mouth once daily, Normal, Disp-90 Tab, R-3      doxepin (SINEquan) 25 mg capsule Take 1 Cap by mouth nightly., Normal, Disp-90 Cap, R-3      acetaminophen (TYLENOL) 325 mg tablet Take 2 Tabs by mouth every four (4) hours as needed for Pain., Normal, Disp-20 Tab, R-0      amLODIPine (NORVASC) 5 mg tablet Take 1 Tab by mouth daily. , Normal, Disp-30 Tab, R-0      albuterol sulfate (PROAIR RESPICLICK) 90 mcg/actuation aepb Take 2 Puffs by inhalation every four (4) hours as needed for Wheezing, Shortness of Breath or Cough., Normal, Disp-1 Inhaler, R-0      albuterol (PROVENTIL VENTOLIN) 2.5 mg /3 mL (0.083 %) nebu 3 mL by Nebulization route every six (6) hours as needed for Wheezing., Normal, Disp-30 Nebule, R-1      multivitamin (ONE A DAY) tablet Take 1 Tab by mouth daily. , Historical Med      glucose blood VI test strips (ONETOUCH ULTRA TEST) strip Check blood glucose as directed, Normal, Disp-100 Strip, R-1       !! - Potential duplicate medications found. Please discuss with provider.         GUILLERMO Emery

## 2020-11-06 NOTE — ED TRIAGE NOTES
Pt presents to ED via triage from home with complaints of abdominal pain and vomiting x 4 days. Patient was seen here Wednesday and was advised to return if symptoms persisted. Pt reports increase in diarrhea.        Past Medical History:   Diagnosis Date    Abdominal adhesions     Adnexal cyst 7/30/2019    Left    Anemia     Asthma     Chronic pelvic pain in female 7/30/2019    Diabetes mellitus     Dyskinesia     bilateral    Essential hypertension     GERD (gastroesophageal reflux disease)     Hypertension     Menopause     Microhematuria     Rheumatoid arteritis ([facility] [address] 2018    Stool color black

## 2020-11-07 ENCOUNTER — PATIENT OUTREACH (OUTPATIENT)
Dept: CASE MANAGEMENT | Age: 60
End: 2020-11-07

## 2020-11-07 NOTE — PROGRESS NOTES
Patient contacted regarding recent discharge and COVID-19 risk. Discussed COVID-19 related testing which was not done at this time. Test results were not done. Patient informed of results, if available?     Care Transition Nurse/ Ambulatory Care Manager/ LPN Care Coordinator contacted the patient by telephone to perform post discharge assessment. Verified name and  with patient as identifiers. Patient has following risk factors of: asthma and diabetes. CTN/ACM/LPN reviewed discharge instructions, medical action plan and red flags related to discharge diagnosis. Reviewed and educated them on any new and changed medications related to discharge diagnosis. Advised obtaining a 90-day supply of all daily and as-needed medications. Advance Care Planning:   Does patient have an Advance Directive: not on file    Education provided regarding infection prevention, and signs and symptoms of COVID-19 and when to seek medical attention with patient who verbalized understanding. Discussed exposure protocols and quarantine from [address] you at higher risk for severe illness  and given an opportunity for questions and concerns. The patient agrees to contact the COVID-19 hotline [phone] or PCP office for questions related to their healthcare. CTN/ACM/LPN provided contact information for future reference. From CDC: Are you at higher risk for severe illness?  Wash your hands often.  Avoid close contact (6 feet, which is about two arm lengths) with people who are sick.  Put distance between yourself and other people if COVID-19 is spreading in your community.  Clean and disinfect frequently touched surfaces.  Avoid all cruise travel and non-essential air travel.  Call your healthcare professional if you have concerns about COVID-19 and your underlying condition or if you are sick.     For more information on steps you can take to protect yourself, see CDC's How to Protect Yourself Patient/family/caregiver given information for [employer] and agrees to enroll no  Patient's preferred e-mail:  NA  Patient's preferred phone number: NA  Based on Loop alert triggers, patient will be contacted by nurse care manager for worsening symptoms. Plan for follow-up call in 7-14 days based on severity of symptoms and risk factors.

## 2020-11-10 ENCOUNTER — HOSPITAL ENCOUNTER (EMERGENCY)
Age: 60
Discharge: HOME | End: 2020-11-10
Attending: EMERGENCY MEDICINE
Payer: COMMERCIAL

## 2020-11-10 ENCOUNTER — APPOINTMENT (OUTPATIENT)
Dept: GENERAL RADIOLOGY | Age: 60
End: 2020-11-10
Attending: EMERGENCY MEDICINE
Payer: COMMERCIAL

## 2020-11-10 VITALS
SYSTOLIC BLOOD PRESSURE: 117 MMHG | BODY MASS INDEX: 25.61 KG/M2 | WEIGHT: 150 LBS | OXYGEN SATURATION: 97 % | HEART RATE: 78 BPM | HEIGHT: 64 IN | RESPIRATION RATE: 20 BRPM | DIASTOLIC BLOOD PRESSURE: 80 MMHG | TEMPERATURE: 97.6 F

## 2020-11-10 DIAGNOSIS — M79.672 LEFT FOOT PAIN: Primary | ICD-10-CM

## 2020-11-10 PROCEDURE — 73630 X-RAY EXAM OF FOOT: CPT

## 2020-11-10 PROCEDURE — 99283 EMERGENCY DEPT VISIT LOW MDM: CPT

## 2020-11-10 PROCEDURE — 74011250637 HC RX REV CODE- 250/637: Performed by: PHYSICIAN ASSISTANT

## 2020-11-10 RX ORDER — HYDROCODONE BITARTRATE AND ACETAMINOPHEN 5; 325 MG/1; MG/1
1 TABLET ORAL
Qty: 4 TAB | Refills: 0 | Status: SHIPPED | OUTPATIENT
Start: 2020-11-10 | End: 2020-11-12

## 2020-11-10 RX ORDER — HYDROCODONE BITARTRATE AND ACETAMINOPHEN 5; 325 MG/1; MG/1
1 TABLET ORAL
Status: COMPLETED | OUTPATIENT
Start: 2020-11-10 | End: 2020-11-10

## 2020-11-10 RX ADMIN — HYDROCODONE BITARTRATE AND ACETAMINOPHEN 1 TABLET: 5; 325 TABLET ORAL at 14:09

## 2020-11-10 NOTE — ED TRIAGE NOTES
Pt arrives with c/o pain left foot after a 5lb bag of chicken fell out of the freezer on to her foot. Pt states the pain is awful.

## 2020-11-10 NOTE — ED PROVIDER NOTES
[facility] EMERGENCY DEPT    Date: 11/10/2020  Patient Name: Jr Moreland    History of Presenting Illness     Chief Complaint   Patient presents with    Toe Pain     61 y.o. female with noted past medical history who presents to the emergency department left foot pain after a frozen chicken fell on her foot. The pain has persisted for 1 hr and she rates the severity 10/10 throbbing. She has not tried anything for the pain. She is able to wiggle her toes and bare minimal weight on her left foot. Associated symptoms include mild swelling. Denies numbness, weakness, deformity, or other symptoms. Pt has hx of Ganglion cyst removal from left dorsal foot 7/2020. Patient denies any other associated signs or symptoms. Patient denies any other complaints. Nursing notes regarding the HPI and triage nursing notes were reviewed. Prior medical records were reviewed. Current Facility-Administered Medications   Medication Dose Route Frequency Provider Last Rate Last Dose    HYDROcodone-acetaminophen (NORCO) 5-325 mg per tablet 1 Tab  1 Tab Oral NOW GUILLERMO Sharp         Current Outpatient Medications   Medication Sig Dispense Refill    HYDROcodone-acetaminophen (NORCO) 5-325 mg per tablet Take 1 Tab by mouth every six (6) hours as needed for Pain for up to 2 days. Max Daily Amount: 4 Tabs. 4 Tab 0    ondansetron (Zofran ODT) 4 mg disintegrating tablet Take 1 tablets every 6-8 hours as needed for nausea and vomiting. 10 Tab 0    ondansetron (Zofran ODT) 4 mg disintegrating tablet Take 1 Tab by mouth every eight (8) hours as needed for Nausea or Vomiting. 30 Tab 0    omeprazole (PRILOSEC) 10 mg capsule take 1 capsule by mouth once daily 90 Cap 4    dapagliflozin (FARXIGA) 10 mg tab tablet Take 10 mg by mouth Every morning.       metFORMIN ER (GLUCOPHAGE XR) 500 mg tablet take 2 tablets by mouth twice a day 120 Tab 5    bisoprolol-hydroCHLOROthiazide (ZIAC) 2.5-6.25 mg per tablet take 1 tablet by mouth once daily 90 Tab 3    doxepin (SINEquan) 25 mg capsule Take 1 Cap by mouth nightly. 90 Cap 3    amLODIPine (NORVASC) 5 mg tablet Take 1 Tab by mouth daily. 30 Tab 0    albuterol sulfate (PROAIR RESPICLICK) 90 mcg/actuation aepb Take 2 Puffs by inhalation every four (4) hours as needed for Wheezing, Shortness of Breath or Cough. 1 Inhaler 0    albuterol (PROVENTIL VENTOLIN) 2.5 mg /3 mL (0.083 %) nebu 3 mL by Nebulization route every six (6) hours as needed for Wheezing. 30 Nebule 1    multivitamin (ONE A DAY) tablet Take 1 Tab by mouth daily.       glucose blood VI test strips (ONETOUCH ULTRA TEST) strip Check blood glucose as directed 100 Strip 1       Past History     Past Medical History:  Past Medical History:   Diagnosis Date    Abdominal adhesions     Adnexal cyst 7/30/2019    Left    Anemia     Asthma     Chronic pelvic pain in female 7/30/2019    Diabetes mellitus     Dyskinesia     bilateral    Essential hypertension     GERD (gastroesophageal reflux disease)     Hypertension     Menopause     Microhematuria     Rheumatoid arteritis ([address] 2018    Stool color black        Past Surgical History:  Past Surgical History:   Procedure Laterality Date    COLONOSCOPY N/A 1/21/2019    COLON[facility]COPY performed by [facility]le [facility] at [facility] ENDOSCOPY    HX CHOLECYSTECTOMY  6/28/10    HX COLONOSCOPY      HX ENDOSCOPY      HX HERNIA REPAIR      HX HYSTERECTOMY  1989    Fibroids    HX KNEE REPLACEMENT Left     HX KNEE REPLACEMENT Right 06/2018    HX OOPHORECTOMY  12/2000    HX ORTHOPAEDIC Right     ankle- multiple surgeries    HX SMALL BOWEL RESECTION  12/2000    HX SMALL BOWEL RESECTION  02/21/2017    Dr. Tracy Blevins         Family History:  Family History   Problem Relation Age of Onset    Hypertension Other         parent    Breast Cancer Other 21    Heart Disease Father     Diabetes Father     Lung Disease Father     Diabetes Mother    [place] Hypertension Other         sibling    Diabetes Other         parent    Kidney Disease Brother     Diabetes Brother     Lung Disease Brother        Social History:  Social History     Tobacco Use    Smoking status: Never Smoker    Smokeless tobacco: Never Used   Substance Use Topics    Alcohol use: No    Drug use: No       Allergies: Allergies   Allergen Reactions    Macrodantin [Nitrofurantoin Macrocrystalline] Itching and Other (comments)    Tape [Adhesive] Other (comments)     Paper tape-- feels like burning skin  Burned skin when had wound vac       Patient's primary care provider (as noted in EPIC):  Harshad Infante MD     ROS:   Constitutional:  Denies malaise, fever, chills. Extremity/MS: + left foot pain   Neuro:  Denies neurologic symptoms/deficits/paresthesias. Skin: Denies rash, itching or skin changes. All other systems negative as reviewed. Visit Vitals  /80 (BP 1 Location: Right arm, BP Patient Position: Sitting)   Pulse 78   Temp 97.6 °F (36.4 °C)   Resp 20   Ht 5' 4\" (1.626 m)   Wt 68 kg (150 lb)   SpO2 97%   BMI 25.75 kg/m²       PHYSICAL EXAM:    CONSTITUTIONAL: Alert, in no apparent distress; well-developed; well-nourished. HEAD:  Normocephalic, atraumatic. No Battles sign. No Raccoons eyes. EYES:  EOM's intact. Normal conjunctiva. Anicteric sclera. ENTM: Nose: no rhinorrhea; Oropharynx:  mucous membranes moist  Neck: Supple   RESP: Chest clear, equal breath sounds. CARDIOVASCULAR:  Regular rate and rhythm. No murmurs, rubs, or gallops. GI: Normal bowel sounds, abdomen soft and non-tender. No masses or organomegaly. : No costo-vertebral angle tenderness. UPPER EXT:  Normal inspection  LOWER EXT: Mild edema over the dorsal aspect of the left foot with TTP to this site; NVI with 5/5 strength. Well healed old surgical scars to bilateral dorsum of feet (from ganglion cyst removal 7/2020). NEURO: Grossly normal motor and sensation.   SKIN: No rashes; Normal for age and stage. PSYCH:  Alert and oriented, normal affect. DIFFERENTIAL DIAGNOSES/ MEDICAL DECISION MAKING:  Contusion, hematoma, muscle strain/ sprain, ligamentous strain/ sprain, ligamentous tear/ disruption or a combination of the above. ED COURSE:      Xr Foot Lt Min 3 V    Result Date: 11/10/2020  EXAM: LEFT FOOT RADIOGRAPHS CLINICAL INDICATION/HISTORY: Left foot pain following injury -Additional: None COMPARISON: None TECHNIQUE: 3 views of the left foot _______________ FINDINGS: BONES: Osseous alignment is as expected on the provided projections. No fracture demonstrated. Degenerative spur formation present across the dorsal midfoot, with moderately severe left first MTP joint osteoarthritis. Prominent plantar kidney all spur. SOFT TISSUES: Dorsal soft tissue swelling over the midfoot is noted. No retained radiopaque foreign object. _______________     IMPRESSION: 1. Soft tissue swelling over the dorsal midfoot without evidence of acute fracture or malalignment. 2. Degenerative changes as above. IMPRESSION AND MEDICAL DECISION MAKING:  Based upon the patients presentation with noted HPI and PE, along with the work up done in the emergency department, I believe that the patient is having noted contusion. No fracture appreciated. Patient states Tylenol is not helping her, she is unable to take NSAIDs, will write for just a few Norco.  She may follow-up with her podiatrist.      Diagnosis:   1.  Left foot pain      Disposition: Discharge    Follow-up Information     Follow up With Specialties Details Why Contact Info    Reed Britton DPM Podiatry In 3 days  [address] [address] [phone]      [facility] EMERGENCY DEPT Emergency Medicine  If symptoms worsen [address]  [phone]          Patient's Medications   Start Taking    HYDROCODONE-ACETAMINOPHEN (NORCO) 5-325 MG PER TABLET    Take 1 Tab by mouth every six (6) hours as needed for Pain for up to 2 days. Max Daily Amount: 4 Tabs. Continue Taking    ALBUTEROL (PROVENTIL VENTOLIN) 2.5 MG /3 ML (0.083 %) NEBU    3 mL by Nebulization route every six (6) hours as needed for Wheezing. ALBUTEROL SULFATE (PROAIR RESPICLICK) 90 MCG/ACTUATION AEPB    Take 2 Puffs by inhalation every four (4) hours as needed for Wheezing, Shortness of Breath or Cough. AMLODIPINE (NORVASC) 5 MG TABLET    Take 1 Tab by mouth daily. BISOPROLOL-HYDROCHLOROTHIAZIDE (ZIAC) 2.5-6.25 MG PER TABLET    take 1 tablet by mouth once daily    DAPAGLIFLOZIN (FARXIGA) 10 MG TAB TABLET    Take 10 mg by mouth Every morning. DOXEPIN (SINEQUAN) 25 MG CAPSULE    Take 1 Cap by mouth nightly. GLUCOSE BLOOD VI TEST STRIPS (ONETOUCH ULTRA TEST) STRIP    Check blood glucose as directed    METFORMIN ER (GLUCOPHAGE XR) 500 MG TABLET    take 2 tablets by mouth twice a day    MULTIVITAMIN (ONE A DAY) TABLET    Take 1 Tab by mouth daily. OMEPRAZOLE (PRILOSEC) 10 MG CAPSULE    take 1 capsule by mouth once daily    ONDANSETRON (ZOFRAN ODT) 4 MG DISINTEGRATING TABLET    Take 1 Tab by mouth every eight (8) hours as needed for Nausea or Vomiting. ONDANSETRON (ZOFRAN ODT) 4 MG DISINTEGRATING TABLET    Take 1 tablets every 6-8 hours as needed for nausea and vomiting. These Medications have changed    No medications on file   Stop Taking    ACETAMINOPHEN (TYLENOL) 325 MG TABLET    Take 2 Tabs by mouth every four (4) hours as needed for Pain.       GUILLERMO Duarte

## 2020-11-16 NOTE — CONSULTS
Surgical Consultation:     Attending: Ester Turner  Reason for Consultation: Vomiting    Consultant: Mirian Vaughan. Karishma Arroyo MD, FACS    Subjective:     Tolu is a 61 y.o. female with a history of open RANJITH by Dr. Chanell Beck 9 days ago. She was discharged yesterday at 1400 and presents today with renewed vomiting and cramping. She states her last flatus was 2 days ago. She has had no BM since surgery. She states she asked to have her NG removed 4 days ago. She never really improved. , vomiting the very next day and each day until discharge. After discharge, the vomiting got worse and was associated with new severe cramping. In the ER, she has vomited once, just had a CT and has labs showing only a WBC of 13K.      Patient Active Problem List    Diagnosis Date Noted    S/P small bowel resection 08/01/2019    Adnexal cyst 07/30/2019    Chronic pelvic pain in female 07/30/2019    Bowel obstruction ([address] 08/23/2018    Non-intractable cyclical vomiting with nausea 01/24/2018    Type 2 diabetes mellitus with nephropathy ([address] 01/19/2018    Chronic abdominal pain 03/14/2017    Post-operative pain 03/14/2017    SBO (small bowel obstruction) ([address] 02/21/2017    Osteoarthritis of left knee 06/27/2016    Hypertension 06/27/2016    Hyperlipidemia 06/27/2016    GERD (gastroesophageal reflux disease) 06/27/2016    Asthma 06/27/2016    Type 2 diabetes mellitus ([address] 06/27/2016    Sural neuritis 06/23/2014    Chest pain 04/20/2014    Essential hypertension, benign 02/27/2012    Generalized abdominal pain      Past Medical History:   Diagnosis Date    Abdominal adhesions     Adnexal cyst 7/30/2019    Left    Anemia     Asthma     Chronic pelvic pain in female 7/30/2019    Diabetes mellitus     Dyskinesia     bilateral    Essential hypertension     GERD (gastroesophageal reflux disease)     Hypertension     Menopause     Microhematuria     Rheumatoid arteritis 2018    Stool color black       Past Surgical History:   Procedure Laterality Date    COLONOSCOPY N/A 1/21/2019    COL[facility] performed by [facility] ENDOSCOPY    HX CHOLECYSTECTOMY  6/28/10    HX COLONOSCOPY      HX ENDOSCOPY      HX HERNIA REPAIR      HX HYSTERECTOMY  1989    Fibroids    HX KNEE REPLACEMENT Left     HX KNEE REPLACEMENT Right 06/2018    HX OOPHORECTOMY  12/2000    HX ORTHOPAEDIC Right     ankle- multiple surgeries    HX SMALL BOWEL RESECTION  12/2000    HX SMALL BOWEL RESECTION  02/21/2017    Dr. Corin Goodson        Social History     Tobacco Use    Smoking status: Never Smoker    Smokeless tobacco: Never Used   Substance Use Topics    Alcohol use: No      Family History   Problem Relation Age of Onset    Hypertension Other         parent    Breast Cancer Other 21    Heart Disease Father     Diabetes Father     Lung Disease Father     Diabetes Mother     Hypertension Other         sibling    Diabetes Other         parent    Kidney Disease Brother     Diabetes Brother     Lung Disease Brother       Current Facility-Administered Medications   Medication Dose Route Frequency    sodium chloride (NS) flush 5-10 mL  5-10 mL IntraVENous PRN    sodium chloride 0.9 % bolus infusion 1,000 mL  1,000 mL IntraVENous ONCE    Followed by    sodium chloride 0.9 % bolus infusion 97 mL  97 mL IntraVENous ONCE    lidocaine (XYLOCAINE) 2 % viscous solution 15 mL  15 mL Mouth/Throat NOW    benzocaine (HURRICAINE) 20 % spray   Mucous Membrane PRN     Current Outpatient Medications   Medication Sig    oxyCODONE-acetaminophen (PERCOCET) 5-325 mg per tablet Take 1 Tab by mouth every four (4) hours as needed for Pain for up to 3 days. Max Daily Amount: 6 Tabs.  cyclobenzaprine (FLEXERIL) 10 mg tablet Take 1 Tab by mouth three (3) times daily as needed for Muscle Spasm(s).  metFORMIN (GLUMETZA ER) 500 mg TG24 24 hour tablet Take 1,000 mg by mouth two (2) times a day.  take 1 tablet by mouth twice a day    multivitamin (ONE A DAY) tablet Take 1 Tab by mouth daily.  ondansetron hcl (ZOFRAN) 4 mg tablet Take 1 Tab by mouth every eight (8) hours as needed for Nausea.  ALLERGY RELIEF, CETIRIZINE, 10 mg tablet take 1 tablet by mouth once daily    bisoprolol-hydroCHLOROthiazide (ZIAC) 2.5-6.25 mg per tablet Take 1 Tab by mouth daily.  doxepin (SINEQUAN) 10 mg capsule Take 1 Cap by mouth nightly.  omeprazole (PRILOSEC) 10 mg capsule take 1 capsule by mouth once daily    FARXIGA 10 mg tab tablet take 1 tablet by mouth once daily    amLODIPine (NORVASC) 2.5 mg tablet take 1 tablet by mouth once daily NIGHTLY    albuterol (PROVENTIL HFA, VENTOLIN HFA, PROAIR HFA) 90 mcg/actuation inhaler Take 1 Puff by inhalation every six (6) hours as needed for Wheezing.  folic acid (FOLVITE) 1 mg tablet Take  by mouth daily.  methotrexate (RHEUMATREX) 2.5 mg tablet Take 7.5 mg by mouth every Tuesday.  sulfaSALAzine (AZULFIDINE) 500 mg tablet Take 500 mg by mouth two (2) times a day.     glucose blood VI test strips (ONETOUCH ULTRA TEST) strip Check blood glucose as directed      Allergies   Allergen Reactions    Macrodantin [Nitrofurantoin Macrocrystalline] Itching and Other (comments)    Tape [Adhesive] Other (comments)     Paper tape-- feels like burning skin  Burned skin when had wound vac        Review of Systems:  Positive in BOLD    CONST: Fever, weight loss, fatigue or chills  GI: Nausea, vomiting, abdominal pain, change in bowel habits, hematochezia, melena, and GERD   INTEG: Dermatitis, abnormal moles  HEENT: Recent changes in vision, vertigo, epistaxis, dysphagia and hoarseness  CV: Chest pain, palpitations, HTN, edema and varicosities  RESP: Cough, shortness of breath, wheezing, hemoptysis, snoring and reactive airway disease  : Hematuria, dysuria, frequency, urgency, nocturia and stress urinary incontinence   MS: Weakness, joint pain and arthritis  ENDO: Diabetes, thyroid disease, polyuria, polydipsia, polyphagia, poor wound healing, heat intolerance, cold intolerance  LYMPH/HEME: Anemia, bruising and history of blood transfusions  NEURO: Dizziness, headache, fainting, seizures and stroke  PSYCH: Anxiety and depression    Objective:     Visit Vitals  /88   Pulse (!) 120   Temp 99.3 °F (37.4 °C)   Resp 20   Ht 5' 4\" (1.626 m)   Wt 69.9 kg (154 lb)   SpO2 97%   BMI 26.43 kg/m²       Physical Exam:      GENERAL: alert, cooperative, mild distress, appears stated age  EYE:conjunctivae and sclerae normal, pupils equal, round, reactive to light, extraocular movements intact without nystagmus  THROAT & NECK: no erythema or exudates noted and neck supple and symmetrical; no palpable masses  LUNG: clear to auscultation bilaterally  HEART: Regular rate and rhythm  ABDOMEN: distended, soft, mildly tender diffusely without peritonitis, active loud BS without rushes or tinkles at my exam.   EXTREMITIES:  extremities normal, atraumatic, no cyanosis or edema  SKIN: Normal.    Imaging and Lab Review:     CT - Images reviewed by me - Very heavy stool burden from TI to rectum. Most all the small bowel is nondistended. She has a loops of proximal and mid jejunum that are distended with air fluid level that extends to the duodenum. There does appear to be a transition point in the left mid abdomen near the anastomosis and I agree with him that there appears to be a 3 way junction at that area with mesenteric swirling near it that would be consistent with transmesenteric internal hernia. The stomach is not very distended at this time. No real free fluid. Radiologist read:    1. Interval postsurgical changes. There are fluid-filled mildly distended loops  of mid small bowel within the midabdomen with adjacent mesenteric edema and  small amount of free intraperitoneal fluid. Some of the free intraperitoneal  fluid demonstrates layering hyperdensity, likely representing a small component  of hemoperitoneum. There appears to be a transition to decompressed small bowel  within the lower mid abdomen, near the patient's anastomotic suture lines. Appearance is suggestive of either partially obstructive interloop adhesive  disease or possibly an internal transmesenteric hernia.     2. No organized drainable fluid collection to suggest abscess.     3. Stable 3.8 x 2.6 cm cystic structure along the left pelvic sidewall. Recent Results (from the past 24 hour(s))   CBC WITH AUTOMATED DIFF    Collection Time: 08/10/19 11:40 AM   Result Value Ref Range    WBC 13.5 (H) 4.6 - 13.2 K/uL    RBC 2.97 (L) 4.20 - 5.30 M/uL    HGB 7.9 (L) 12.0 - 16.0 g/dL    HCT 25.4 (L) 35.0 - 45.0 %    MCV 85.5 74.0 - 97.0 FL    MCH 26.6 24.0 - 34.0 PG    MCHC 31.1 31.0 - 37.0 g/dL    RDW 17.6 (H) 11.6 - 14.5 %    PLATELET 450 (H) 240 - 420 K/uL    MPV 9.9 9.2 - 11.8 FL    NEUTROPHILS 86 (H) 40 - 73 %    LYMPHOCYTES 8 (L) 21 - 52 %    MONOCYTES 6 3 - 10 %    EOSINOPHILS 0 0 - 5 %    BASOPHILS 0 0 - 2 %    ABS. NEUTROPHILS 11.6 (H) 1.8 - 8.0 K/UL    ABS. LYMPHOCYTES 1.1 0.9 - 3.6 K/UL    ABS. MONOCYTES 0.9 0.05 - 1.2 K/UL    ABS. EOSINOPHILS 0.0 0.0 - 0.4 K/UL    ABS. BASOPHILS 0.0 0.0 - 0.1 K/UL    DF AUTOMATED     CULTURE, BLOOD    Collection Time: 08/10/19 11:40 AM   Result Value Ref Range    Special Requests: PERIPHERAL      Culture result: PENDING    POC LACTIC ACID    Collection Time: 08/10/19 11:52 AM   Result Value Ref Range    Lactic Acid (POC) 1.14 0.40 - 2.00 mmol/L   CULTURE, BLOOD    Collection Time: 08/10/19 12:00 PM   Result Value Ref Range    Special Requests: PERIPHERAL      Culture result: PENDING        images and reports reviewed    Assessment:   1. POD 9 RANJITH  2. Early post-op obstruction - this may very well be transmesenteric internal hernia       Plan:     I have discussed with Dr. Clara Griffin. He is coming in to see the patient and decide if exploration is indicated.  In the meantime, bowel rest, NG, IV hydration and request hopsitalist service manage her diabetes, hypertension and all medical issues,.      Signed By: Mike Schneider MD     August 10, 2019 History of Clostridium difficile colitis

## 2020-11-18 ENCOUNTER — HOSPITAL ENCOUNTER (EMERGENCY)
Age: 60
Discharge: HOME | End: 2020-11-19
Attending: EMERGENCY MEDICINE
Payer: COMMERCIAL

## 2020-11-18 DIAGNOSIS — R10.33 ABDOMINAL PAIN, ACUTE, PERIUMBILICAL: Primary | ICD-10-CM

## 2020-11-18 DIAGNOSIS — R11.2 NON-INTRACTABLE VOMITING WITH NAUSEA, UNSPECIFIED VOMITING TYPE: ICD-10-CM

## 2020-11-18 DIAGNOSIS — R14.0 ABDOMINAL BLOATING: ICD-10-CM

## 2020-11-18 LAB
ALBUMIN SERPL-MCNC: 3.5 G/DL (ref 3.4–5)
ALBUMIN/GLOB SERPL: 0.8 {RATIO} (ref 0.8–1.7)
ALP SERPL-CCNC: 142 U/L (ref 45–117)
ALT SERPL-CCNC: 36 U/L (ref 13–56)
ANION GAP SERPL CALC-SCNC: 9 MMOL/L (ref 3–18)
APPEARANCE UR: CLEAR
AST SERPL-CCNC: 21 U/L (ref 10–38)
BASOPHILS # BLD: 0 K/UL (ref 0–0.1)
BASOPHILS NFR BLD: 0 % (ref 0–2)
BILIRUB SERPL-MCNC: 0.2 MG/DL (ref 0.2–1)
BILIRUB UR QL: NEGATIVE
BUN SERPL-MCNC: 15 MG/DL (ref 7–18)
BUN/CREAT SERPL: 14 (ref 12–20)
CALCIUM SERPL-MCNC: 9.3 MG/DL (ref 8.5–10.1)
CHLORIDE SERPL-SCNC: 103 MMOL/L (ref 100–111)
CO2 SERPL-SCNC: 28 MMOL/L (ref 21–32)
COLOR UR: YELLOW
CREAT SERPL-MCNC: 1.11 MG/DL (ref 0.6–1.3)
DIFFERENTIAL METHOD BLD: ABNORMAL
EOSINOPHIL # BLD: 0.3 K/UL (ref 0–0.4)
EOSINOPHIL NFR BLD: 5 % (ref 0–5)
ERYTHROCYTE [DISTWIDTH] IN BLOOD BY AUTOMATED COUNT: 14.8 % (ref 11.6–14.5)
GLOBULIN SER CALC-MCNC: 4.4 G/DL (ref 2–4)
GLUCOSE SERPL-MCNC: 165 MG/DL (ref 74–99)
GLUCOSE UR STRIP.AUTO-MCNC: >1000 MG/DL
HCT VFR BLD AUTO: 43.7 % (ref 35–45)
HGB BLD-MCNC: 13.3 G/DL (ref 12–16)
HGB UR QL STRIP: NEGATIVE
KETONES UR QL STRIP.AUTO: NEGATIVE MG/DL
LEUKOCYTE ESTERASE UR QL STRIP.AUTO: NEGATIVE
LIPASE SERPL-CCNC: 172 U/L (ref 73–393)
LYMPHOCYTES # BLD: 2 K/UL (ref 0.9–3.6)
LYMPHOCYTES NFR BLD: 29 % (ref 21–52)
MCH RBC QN AUTO: 26.3 PG (ref 24–34)
MCHC RBC AUTO-ENTMCNC: 30.4 G/DL (ref 31–37)
MCV RBC AUTO: 86.4 FL (ref 74–97)
MONOCYTES # BLD: 0.4 K/UL (ref 0.05–1.2)
MONOCYTES NFR BLD: 6 % (ref 3–10)
NEUTS SEG # BLD: 4.1 K/UL (ref 1.8–8)
NEUTS SEG NFR BLD: 60 % (ref 40–73)
NITRITE UR QL STRIP.AUTO: NEGATIVE
PH UR STRIP: 7 [PH] (ref 5–8)
PLATELET # BLD AUTO: 341 K/UL (ref 135–420)
PMV BLD AUTO: 10.5 FL (ref 9.2–11.8)
POTASSIUM SERPL-SCNC: 4.3 MMOL/L (ref 3.5–5.5)
PROT SERPL-MCNC: 7.9 G/DL (ref 6.4–8.2)
PROT UR STRIP-MCNC: NEGATIVE MG/DL
RBC # BLD AUTO: 5.06 M/UL (ref 4.2–5.3)
SODIUM SERPL-SCNC: 140 MMOL/L (ref 136–145)
SP GR UR REFRACTOMETRY: 1.03 (ref 1–1.03)
UROBILINOGEN UR QL STRIP.AUTO: 0.2 EU/DL (ref 0.2–1)
WBC # BLD AUTO: 6.8 K/UL (ref 4.6–13.2)

## 2020-11-18 PROCEDURE — 81003 URINALYSIS AUTO W/O SCOPE: CPT

## 2020-11-18 PROCEDURE — 80053 COMPREHEN METABOLIC PANEL: CPT

## 2020-11-18 PROCEDURE — 85025 COMPLETE CBC W/AUTO DIFF WBC: CPT

## 2020-11-18 PROCEDURE — 96374 THER/PROPH/DIAG INJ IV PUSH: CPT

## 2020-11-18 PROCEDURE — 74011250636 HC RX REV CODE- 250/636: Performed by: EMERGENCY MEDICINE

## 2020-11-18 PROCEDURE — 99283 EMERGENCY DEPT VISIT LOW MDM: CPT

## 2020-11-18 PROCEDURE — 74011250637 HC RX REV CODE- 250/637: Performed by: EMERGENCY MEDICINE

## 2020-11-18 PROCEDURE — 96375 TX/PRO/DX INJ NEW DRUG ADDON: CPT

## 2020-11-18 PROCEDURE — 83690 ASSAY OF LIPASE: CPT

## 2020-11-18 RX ORDER — MORPHINE SULFATE 4 MG/ML
4 INJECTION, SOLUTION INTRAMUSCULAR; INTRAVENOUS
Status: COMPLETED | OUTPATIENT
Start: 2020-11-18 | End: 2020-11-18

## 2020-11-18 RX ORDER — ONDANSETRON 2 MG/ML
4 INJECTION INTRAMUSCULAR; INTRAVENOUS
Status: COMPLETED | OUTPATIENT
Start: 2020-11-18 | End: 2020-11-18

## 2020-11-18 RX ORDER — KETOROLAC TROMETHAMINE 15 MG/ML
15 INJECTION, SOLUTION INTRAMUSCULAR; INTRAVENOUS
Status: COMPLETED | OUTPATIENT
Start: 2020-11-18 | End: 2020-11-18

## 2020-11-18 RX ORDER — DICYCLOMINE HYDROCHLORIDE 10 MG/1
20 CAPSULE ORAL
Status: COMPLETED | OUTPATIENT
Start: 2020-11-18 | End: 2020-11-18

## 2020-11-18 RX ORDER — FAMOTIDINE 10 MG/ML
20 INJECTION INTRAVENOUS
Status: COMPLETED | OUTPATIENT
Start: 2020-11-18 | End: 2020-11-18

## 2020-11-18 RX ORDER — HYDROMORPHONE HYDROCHLORIDE 1 MG/ML
0.5 INJECTION, SOLUTION INTRAMUSCULAR; INTRAVENOUS; SUBCUTANEOUS
Status: COMPLETED | OUTPATIENT
Start: 2020-11-18 | End: 2020-11-18

## 2020-11-18 RX ADMIN — HYDROMORPHONE HYDROCHLORIDE 0.5 MG: 1 INJECTION, SOLUTION INTRAMUSCULAR; INTRAVENOUS; SUBCUTANEOUS at 23:57

## 2020-11-18 RX ADMIN — SODIUM CHLORIDE 1000 ML: 900 INJECTION, SOLUTION INTRAVENOUS at 21:00

## 2020-11-18 RX ADMIN — KETOROLAC TROMETHAMINE 15 MG: 15 INJECTION, SOLUTION INTRAMUSCULAR; INTRAVENOUS at 23:57

## 2020-11-18 RX ADMIN — MORPHINE SULFATE 4 MG: 4 INJECTION, SOLUTION INTRAMUSCULAR; INTRAVENOUS at 23:57

## 2020-11-18 RX ADMIN — FAMOTIDINE 20 MG: 10 INJECTION, SOLUTION INTRAVENOUS at 21:12

## 2020-11-18 RX ADMIN — DICYCLOMINE HYDROCHLORIDE 20 MG: 10 CAPSULE ORAL at 23:57

## 2020-11-18 RX ADMIN — ONDANSETRON 4 MG: 2 INJECTION INTRAMUSCULAR; INTRAVENOUS at 21:12

## 2020-11-19 VITALS
HEART RATE: 90 BPM | TEMPERATURE: 97.6 F | SYSTOLIC BLOOD PRESSURE: 142 MMHG | OXYGEN SATURATION: 100 % | RESPIRATION RATE: 16 BRPM | DIASTOLIC BLOOD PRESSURE: 81 MMHG

## 2020-11-19 RX ORDER — ONDANSETRON 4 MG/1
TABLET, ORALLY DISINTEGRATING ORAL
Qty: 30 TAB | Refills: 1 | Status: SHIPPED | OUTPATIENT
Start: 2020-11-19 | End: 2020-11-24

## 2020-11-19 NOTE — DISCHARGE INSTRUCTIONS
Patient Education        Abdominal Pain: Care Instructions  Your Care Instructions     Abdominal pain has many possible causes. Some aren't serious and get better on their own in a few days. Others need more testing and treatment. If your pain continues or gets worse, you need to be rechecked and may need more tests to find out what is wrong. You may need surgery to correct the problem. Don't ignore new symptoms, such as fever, nausea and vomiting, urination problems, pain that gets worse, and dizziness. These may be signs of a more serious problem. Your doctor may have recommended a follow-up visit in the next 8 to 12 hours. If you are not getting better, you may need more tests or treatment. The doctor has checked you carefully, but problems can develop later. If you notice any problems or new symptoms, get medical treatment right away. Follow-up care is a key part of your treatment and safety. Be sure to make and go to all appointments, and call your doctor if you are having problems. It's also a good idea to know your test results and keep a list of the medicines you take. How can you care for yourself at home? · Rest until you feel better. · To prevent dehydration, drink plenty of fluids, enough so that your urine is light yellow or clear like water. Choose water and other caffeine-free clear liquids until you feel better. If you have kidney, heart, or liver disease and have to limit fluids, talk with your doctor before you increase the amount of fluids you drink. · If your stomach is upset, eat mild foods, such as rice, dry toast or crackers, bananas, and applesauce. Try eating several small meals instead of two or three large ones. · Wait until 48 hours after all symptoms have gone away before you have spicy foods, alcohol, and drinks that contain caffeine. · Do not eat foods that are high in fat. · Avoid anti-inflammatory medicines such as aspirin, ibuprofen (Advil, Motrin), and naproxen (Aleve). These can cause stomach upset. Talk to your doctor if you take daily aspirin for another health problem. When should you call for help? Call 911 anytime you think you may need emergency care. For example, call if:    · You passed out (lost consciousness).     · You pass maroon or very bloody stools.     · You vomit blood or what looks like coffee grounds.     · You have new, severe belly pain. Call your doctor now or seek immediate medical care if:    · Your pain gets worse, especially if it becomes focused in one area of your belly.     · You have a new or higher fever.     · Your stools are black and look like tar, or they have streaks of blood.     · You have unexpected vaginal bleeding.     · You have symptoms of a urinary tract infection. These may include:  ? Pain when you urinate. ? Urinating more often than usual.  ? Blood in your urine.     · You are dizzy or lightheaded, or you feel like you may faint. Watch closely for changes in your health, and be sure to contact your doctor if:    · You are not getting better after 1 day (24 hours). Where can you learn more? Go to http://www.gray.com/  Enter R741 in the search box to learn more about \"Abdominal Pain: Care Instructions. \"  Current as of: June 26, 2019               Content Version: 12.6  © 6630-9364 [employer]. Care instructions adapted under license by [employer] (which disclaims liability or warranty for this information). If you have questions about a medical condition or this instruction, always ask your healthcare professional. [place] [address] any warranty or liability for your use of this information. Patient Education        Gas and Bloating: Care Instructions  Your Care Instructions     Gas and bloating can be uncomfortable and embarrassing problems. All people pass gas, but some people produce more gas than others, sometimes enough to cause distress.  It is normal to pass gas from 6 to 20 times per day. Excess gas usually is not caused by a serious health problem. Gas and bloating usually are caused by something you eat or drink, including some food supplements and medicines. Gas and bloating are usually harmless and go away without treatment. However, changing your diet can help end the problem. Some over-the-counter medicines can help prevent gas and relieve bloating. Follow-up care is a key part of your treatment and safety. Be sure to make and go to all appointments, and call your doctor if you are having problems. It's also a good idea to know your test results and keep a list of the medicines you take. How can you care for yourself at home? · Keep a food diary if you think a food gives you gas. Write down what you eat or drink. Also record when you get gas. If you notice that a food seems to cause your gas each time, avoid it and see if the gas goes away. Examples of foods that cause gas include:  ? Fried and fatty foods. ? Beans. ? Vegetables such as artichokes, asparagus, broccoli, brussels sprouts, cabbage, cauliflower, cucumbers, green peppers, onions, peas, radishes, and raw potatoes. ? Fruits such as apricots, bananas, melons, peaches, pears, prunes, and raw apples. ? Wheat and wheat bran. · Soak dry beans in water overnight, then dump the water and cook the soaked beans in new water. This can help prevent gas and bloating. · If you have problems with lactose, avoid dairy products such as milk and cheese. · Try not to swallow air. Do not drink through a straw, gulp your food, or chew gum. · Take an over-the-counter medicine. Read and follow all instructions on the label. ? Food enzymes, such as Beano, can be added to gas-producing foods to prevent gas. ? Antacids, such as Maalox Anti-Gas and Mylanta Gas, can relieve bloating by making you burp. Be careful when you take over-the-counter antacid medicines.  Many of these medicines have aspirin in them. Read the label to make sure that you are not taking more than the recommended dose. Too much aspirin can be harmful. ? Activated charcoal tablets, such as CharcoCaps, may decrease odor from gas you pass. ? If you have problems with lactose, you can take medicines such as Dairy Ease and Lactaid with dairy products to prevent gas and bloating. · Get some exercise regularly. When should you call for help? Call 911 anytime you think you may need emergency care. For example, call if:    · You have gas and signs of a heart attack, such as:  ? Chest pain or pressure. ? Sweating. ? Shortness of breath. ? Nausea or vomiting. ? Pain that spreads from the chest to the neck, jaw, or one or both shoulders or arms. ? Dizziness or lightheadedness. ? A fast or uneven pulse. After calling 911, chew 1 adult-strength aspirin. Wait for an ambulance. Do not try to drive yourself. Call your doctor now or seek immediate medical care if:    · You have severe belly pain.     · You have blood in your stool. Watch closely for changes in your health, and be sure to contact your doctor if:    · You have blood or pus in your urine.     · Your urine is cloudy or smells bad.     · You are burping and have trouble swallowing.     · You feel bloated and have swelling in your belly.     · You do not get better as expected. Where can you learn more? Go to http://www.gray.com/  Enter X1429000 in the search box to learn more about \"Gas and Bloating: Care Instructions. \"  Current as of: June 26, 2019               Content Version: 12.6  © 2198-6401 [employer]. Care instructions adapted under license by [employer] (which disclaims liability or warranty for this information). If you have questions about a medical condition or this instruction, always ask your healthcare professional. [address] any warranty or liability for your use of this information. Patient Education        Abdominal Pain: Care Instructions  Your Care Instructions     Abdominal pain has many possible causes. Some aren't serious and get better on their own in a few days. Others need more testing and treatment. If your pain continues or gets worse, you need to be rechecked and may need more tests to find out what is wrong. You may need surgery to correct the problem. Don't ignore new symptoms, such as fever, nausea and vomiting, urination problems, pain that gets worse, and dizziness. These may be signs of a more serious problem. Your doctor may have recommended a follow-up visit in the next 8 to 12 hours. If you are not getting better, you may need more tests or treatment. The doctor has checked you carefully, but problems can develop later. If you notice any problems or new symptoms, get medical treatment right away. Follow-up care is a key part of your treatment and safety. Be sure to make and go to all appointments, and call your doctor if you are having problems. It's also a good idea to know your test results and keep a list of the medicines you take. How can you care for yourself at home? · Rest until you feel better. · To prevent dehydration, drink plenty of fluids, enough so that your urine is light yellow or clear like water. Choose water and other caffeine-free clear liquids until you feel better. If you have kidney, heart, or liver disease and have to limit fluids, talk with your doctor before you increase the amount of fluids you drink. · If your stomach is upset, eat mild foods, such as rice, dry toast or crackers, bananas, and applesauce. Try eating several small meals instead of two or three large ones. · Wait until 48 hours after all symptoms have gone away before you have spicy foods, alcohol, and drinks that contain caffeine. · Do not eat foods that are high in fat.   · Avoid anti-inflammatory medicines such as aspirin, ibuprofen (Advil, Motrin), and naproxen (Aleve). These can cause stomach upset. Talk to your doctor if you take daily aspirin for another health problem. When should you call for help? Call 911 anytime you think you may need emergency care. For example, call if:    · You passed out (lost consciousness).     · You pass maroon or very bloody stools.     · You vomit blood or what looks like coffee grounds.     · You have new, severe belly pain. Call your doctor now or seek immediate medical care if:    · Your pain gets worse, especially if it becomes focused in one area of your belly.     · You have a new or higher fever.     · Your stools are black and look like tar, or they have streaks of blood.     · You have unexpected vaginal bleeding.     · You have symptoms of a urinary tract infection. These may include:  ? Pain when you urinate. ? Urinating more often than usual.  ? Blood in your urine.     · You are dizzy or lightheaded, or you feel like you may faint. Watch closely for changes in your health, and be sure to contact your doctor if:    · You are not getting better after 1 day (24 hours). Where can you learn more? Go to http://www.gray.com/  Enter G824 in the search box to learn more about \"Abdominal Pain: Care Instructions. \"  Current as of: June 26, 2019               Content Version: 12.6  © 9156-9589 [employer]. Care instructions adapted under license by [employer] (which disclaims liability or warranty for this information). If you have questions about a medical condition or this instruction, always ask your healthcare professional. [place] [address] any warranty or liability for your use of this information.

## 2020-11-19 NOTE — ED TRIAGE NOTES
Patient co generalized abdominal pain and bloating since Sunday. Has vomited a few times. Able to eat and drink small amounts. States pain is intermittent.  Hx small bowel obstruction,

## 2020-11-19 NOTE — ED PROVIDER NOTES
Lulu Schneider is a 61 y.o. female with history of previous abdominal surgery and multiple visits for recurrent abdominal pain with complaints of recurrent pain in her lower mid abdomen that started earlier today with nausea and vomiting. She has been passing stool and gas without difficulty. No urinary symptoms. No hematuria, dysuria or frequency. No fever chills or sweats. No cough or chest pain. Pain is sharp and stabbing. Consistent with previous episodes she has had. She did not take anything for the pain prior to arrival    The history is provided by the patient and medical records.         Past Medical History:   Diagnosis Date    Abdominal adhesions     Adnexal cyst 7/30/2019    Left    Anemia     Asthma     Chronic pelvic pain in female 7/30/2019    Diabetes mellitus     Dyskinesia     bilateral    Essential hypertension     GERD (gastroesophageal reflux disease)     Hypertension     Menopause     Microhematuria     Rheumatoid arteritis ([facility] [address] 2018    Stool color black        Past Surgical History:   Procedure Laterality Date    COLONOSCOPY N/A 1/21/2019    [facility] ENDOSCOPY    HX CHOLECYSTECTOMY  6/28/10    HX COLONOSCOPY      HX ENDOSCOPY      HX HERNIA REPAIR      HX HYSTERECTOMY  1989    Fibroids    HX KNEE REPLACEMENT Left     HX KNEE REPLACEMENT Right 06/2018    HX OOPHORECTOMY  12/2000    HX ORTHOPAEDIC Right     ankle- multiple surgeries    HX SMALL BOWEL RESECTION  12/2000    HX SMALL BOWEL RESECTION  02/21/2017    Dr. Heriberto Owens           Family History:   Problem Relation Age of Onset    Hypertension Other         parent    Breast Cancer Other 21    Heart Disease Father     Diabetes Father     Lung Disease Father     Diabetes Mother     Hypertension Other         sibling    Diabetes Other         parent    Kidney Disease Brother     Diabetes Brother     Lung Disease Brother Social History     Socioeconomic History    Marital status:      Spouse name: Not on file    Number of children: Not on file    Years of education: Not on file    Highest education level: Not on file   Occupational History    Not on file   Social Needs    Financial resource strain: Not on file    Food insecurity     Worry: Not on file     Inability: Not on file    Transportation needs     Medical: Not on file     Non-medical: Not on file   Tobacco Use    Smoking status: Never Smoker    Smokeless tobacco: Never Used   Substance and Sexual Activity    Alcohol use: No    Drug use: No    Sexual activity: Yes     Partners: Male     Birth control/protection: None   Lifestyle    Physical activity     Days per week: Not on file     Minutes per session: Not on file    Stress: Not on file   Relationships    Social connections     Talks on phone: Not on file     Gets together: Not on file     Attends Mu-ism service: Not on file     Active member of club or organization: Not on file     Attends meetings of clubs or organizations: Not on file     Relationship status: Not on file    Intimate partner violence     Fear of current or ex partner: Not on file     Emotionally abused: Not on file     Physically abused: Not on file     Forced sexual activity: Not on file   Other Topics Concern    Not on file   Social History Narrative    Not on file         ALLERGIES: Macrodantin [nitrofurantoin macrocrystalline] and Tape [adhesive]    Review of Systems   Constitutional: Positive for appetite change. Negative for fever. HENT: Negative for sore throat and trouble swallowing. Eyes: Negative for visual disturbance. Respiratory: Negative for cough and shortness of breath. Cardiovascular: Negative for chest pain. Gastrointestinal: Positive for abdominal distention, abdominal pain, nausea and vomiting. Negative for blood in stool, constipation and diarrhea. Endocrine: Negative for polyuria. Genitourinary: Negative for difficulty urinating and dysuria. Musculoskeletal: Negative for gait problem. Skin: Negative for rash. Allergic/Immunologic: Negative for immunocompromised state. Neurological: Negative for syncope. Psychiatric/Behavioral: Positive for sleep disturbance. Vitals:    11/18/20 2044   BP: 135/83   Pulse: 99   Resp: 18   Temp: 97.6 °F (36.4 °C)   SpO2: 100%            Physical Exam  Vitals signs and nursing note reviewed. Constitutional:       General: She is in acute distress. Appearance: She is well-developed. She is obese. She is ill-appearing. She is not toxic-appearing or diaphoretic. HENT:      Head: Normocephalic and atraumatic. Right Ear: External ear normal.      Left Ear: External ear normal.      Nose: Nose normal.      Mouth/Throat:      Pharynx: Uvula midline. Eyes:      General: No scleral icterus. Conjunctiva/sclera: Conjunctivae normal.   Neck:      Musculoskeletal: Neck supple. Cardiovascular:      Rate and Rhythm: Normal rate and regular rhythm. Heart sounds: Normal heart sounds. Pulmonary:      Effort: Pulmonary effort is normal.      Breath sounds: Normal breath sounds. Abdominal:      General: Abdomen is protuberant. There is distension. Palpations: Abdomen is soft. There is no hepatomegaly or splenomegaly. Tenderness: There is generalized abdominal tenderness and tenderness in the periumbilical area. There is guarding. There is no right CVA tenderness. Negative signs include Feldman's sign and McBurney's sign. Musculoskeletal:      Right lower leg: No edema. Left lower leg: No edema. Skin:     General: Skin is warm and dry. Capillary Refill: Capillary refill takes less than 2 seconds. Neurological:      Mental Status: She is alert and oriented to person, place, and time.       Gait: Gait normal.   Psychiatric:         Behavior: Behavior normal.          MDM       Procedures    Vitals:  Patient Vitals for the past 12 hrs:   Temp Pulse Resp BP SpO2   11/18/20 2044 97.6 °F (36.4 °C) 99 18 135/83 100 %         Medications ordered:   Medications   sodium chloride 0.9 % bolus infusion 1,000 mL (1,000 mL IntraVENous New Bag 11/18/20 2100)   ondansetron (ZOFRAN) injection 4 mg (4 mg IntraVENous Given 11/18/20 2112)   morphine injection 4 mg (4 mg IntraVENous Given 11/18/20 2357)   famotidine (PF) (PEPCID) injection 20 mg (20 mg IntraVENous Given 11/18/20 2112)   HYDROmorphone (DILAUDID) syringe 0.5 mg (0.5 mg IntraVENous Given 11/18/20 2357)   ketorolac (TORADOL) injection 15 mg (15 mg IntraVENous Given 11/18/20 2357)   dicyclomine (BENTYL) capsule 20 mg (20 mg Oral Given 11/18/20 2357)         Lab findings:  Recent Results (from the past 12 hour(s))   URINALYSIS W/ RFLX MICROSCOPIC    Collection Time: 11/18/20  8:46 PM   Result Value Ref Range    Color YELLOW      Appearance CLEAR      Specific gravity 1.026 1.005 - 1.030      pH (UA) 7.0 5.0 - 8.0      Protein Negative NEG mg/dL    Glucose >1,000 (A) NEG mg/dL    Ketone Negative NEG mg/dL    Bilirubin Negative NEG      Blood Negative NEG      Urobilinogen 0.2 0.2 - 1.0 EU/dL    Nitrites Negative NEG      Leukocyte Esterase Negative NEG     CBC WITH AUTOMATED DIFF    Collection Time: 11/18/20  8:53 PM   Result Value Ref Range    WBC 6.8 4.6 - 13.2 K/uL    RBC 5.06 4.20 - 5.30 M/uL    HGB 13.3 12.0 - 16.0 g/dL    HCT 43.7 35.0 - 45.0 %    MCV 86.4 74.0 - 97.0 FL    MCH 26.3 24.0 - 34.0 PG    MCHC 30.4 (L) 31.0 - 37.0 g/dL    RDW 14.8 (H) 11.6 - 14.5 %    PLATELET 900 809 - 003 K/uL    MPV 10.5 9.2 - 11.8 FL    NEUTROPHILS 60 40 - 73 %    LYMPHOCYTES 29 21 - 52 %    MONOCYTES 6 3 - 10 %    EOSINOPHILS 5 0 - 5 %    BASOPHILS 0 0 - 2 %    ABS. NEUTROPHILS 4.1 1.8 - 8.0 K/UL    ABS. LYMPHOCYTES 2.0 0.9 - 3.6 K/UL    ABS. MONOCYTES 0.4 0.05 - 1.2 K/UL    ABS. EOSINOPHILS 0.3 0.0 - 0.4 K/UL    ABS.  BASOPHILS 0.0 0.0 - 0.1 K/UL    DF AUTOMATED     METABOLIC PANEL, COMPREHENSIVE    Collection Time: 11/18/20  8:53 PM   Result Value Ref Range    Sodium 140 136 - 145 mmol/L    Potassium 4.3 3.5 - 5.5 mmol/L    Chloride 103 100 - 111 mmol/L    CO2 28 21 - 32 mmol/L    Anion gap 9 3.0 - 18 mmol/L    Glucose 165 (H) 74 - 99 mg/dL    BUN 15 7.0 - 18 MG/DL    Creatinine 1.11 0.6 - 1.3 MG/DL    BUN/Creatinine ratio 14 12 - 20      GFR est AA >60 >60 ml/min/1.73m2    GFR est non-AA 50 (L) >60 ml/min/1.73m2    Calcium 9.3 8.5 - 10.1 MG/DL    Bilirubin, total 0.2 0.2 - 1.0 MG/DL    ALT (SGPT) 36 13 - 56 U/L    AST (SGOT) 21 10 - 38 U/L    Alk. phosphatase 142 (H) 45 - 117 U/L    Protein, total 7.9 6.4 - 8.2 g/dL    Albumin 3.5 3.4 - 5.0 g/dL    Globulin 4.4 (H) 2.0 - 4.0 g/dL    A-G Ratio 0.8 0.8 - 1.7     LIPASE    Collection Time: 11/18/20  8:53 PM   Result Value Ref Range    Lipase 172 73 - 393 U/L       EKG interpretation by ED Physician:    Pulse ox interpretation: 100% room air, normal    X-Ray, CT or other radiology findings or impressions:  No orders to display       Progress notes, Consult notes or additional Procedure notes:   Patient feels much more comfortable. Abdomen is soft. Do not feel patient quires repeat imaging as she has had multiple CTs done this year alone with no significant findings. Her labs are unremarkable. I have discussed with patient and/or family/sig other the results, interpretation of any imaging if performed, suspected diagnosis and treatment plan to include instructions regarding the diagnoses listed to which understanding was expressed with all questions answered      Reevaluation of patient:   stable    Disposition:  Diagnosis:   1. Abdominal pain, acute, periumbilical    2. Non-intractable vomiting with nausea, unspecified vomiting type    3.  Abdominal bloating        Disposition: home    Follow-up Information     Follow up With Specialties Details Why Contact Xenia Bean MD Family Medicine, Internal Medicine Schedule an appointment as soon as possible for a visit  [address]  [place] [phone]  [phone]      [address] [facility] EMERGENCY DEPT Emergency Medicine  If symptoms worsen [address]  [phone]            Patient's Medications   Start Taking    No medications on file   Continue Taking    ALBUTEROL (PROVENTIL VENTOLIN) 2.5 MG /3 ML (0.083 %) NEBU    3 mL by Nebulization route every six (6) hours as needed for Wheezing. ALBUTEROL SULFATE (PROAIR RESPICLICK) 90 MCG/ACTUATION AEPB    Take 2 Puffs by inhalation every four (4) hours as needed for Wheezing, Shortness of Breath or Cough. AMLODIPINE (NORVASC) 5 MG TABLET    Take 1 Tab by mouth daily. BISOPROLOL-HYDROCHLOROTHIAZIDE (ZIAC) 2.5-6.25 MG PER TABLET    take 1 tablet by mouth once daily    DAPAGLIFLOZIN (FARXIGA) 10 MG TAB TABLET    Take 10 mg by mouth Every morning. DOXEPIN (SINEQUAN) 25 MG CAPSULE    Take 1 Cap by mouth nightly. GLUCOSE BLOOD VI TEST STRIPS (ONETOUCH ULTRA TEST) STRIP    Check blood glucose as directed    METFORMIN ER (GLUCOPHAGE XR) 500 MG TABLET    take 2 tablets by mouth twice a day    MULTIVITAMIN (ONE A DAY) TABLET    Take 1 Tab by mouth daily. OMEPRAZOLE (PRILOSEC) 10 MG CAPSULE    take 1 capsule by mouth once daily   These Medications have changed    Modified Medication Previous Medication    ONDANSETRON (ZOFRAN ODT) 4 MG DISINTEGRATING TABLET ondansetron (Zofran ODT) 4 mg disintegrating tablet       Take 1 tablets every 6-8 hours as needed for nausea and vomiting. Take 1 tablets every 6-8 hours as needed for nausea and vomiting. Stop Taking    ONDANSETRON (ZOFRAN ODT) 4 MG DISINTEGRATING TABLET    Take 1 Tab by mouth every eight (8) hours as needed for Nausea or Vomiting.

## 2020-11-24 ENCOUNTER — HOSPITAL ENCOUNTER (EMERGENCY)
Age: 60
Discharge: HOME | End: 2020-11-24
Attending: STUDENT IN AN ORGANIZED HEALTH CARE EDUCATION/TRAINING PROGRAM
Payer: COMMERCIAL

## 2020-11-24 ENCOUNTER — APPOINTMENT (OUTPATIENT)
Dept: CT IMAGING | Age: 60
End: 2020-11-24
Attending: NURSE PRACTITIONER
Payer: COMMERCIAL

## 2020-11-24 VITALS
HEART RATE: 97 BPM | OXYGEN SATURATION: 100 % | SYSTOLIC BLOOD PRESSURE: 136 MMHG | DIASTOLIC BLOOD PRESSURE: 85 MMHG | TEMPERATURE: 98 F | WEIGHT: 145 LBS | HEIGHT: 64 IN | RESPIRATION RATE: 18 BRPM | BODY MASS INDEX: 24.75 KG/M2

## 2020-11-24 DIAGNOSIS — R10.31 RIGHT LOWER QUADRANT ABDOMINAL PAIN: Primary | ICD-10-CM

## 2020-11-24 DIAGNOSIS — R11.2 NON-INTRACTABLE VOMITING WITH NAUSEA, UNSPECIFIED VOMITING TYPE: ICD-10-CM

## 2020-11-24 LAB
ALBUMIN SERPL-MCNC: 3.4 G/DL (ref 3.4–5)
ALBUMIN/GLOB SERPL: 0.9 {RATIO} (ref 0.8–1.7)
ALP SERPL-CCNC: 121 U/L (ref 45–117)
ALT SERPL-CCNC: 26 U/L (ref 13–56)
ANION GAP SERPL CALC-SCNC: 6 MMOL/L (ref 3–18)
APPEARANCE UR: CLEAR
AST SERPL-CCNC: 12 U/L (ref 10–38)
BASOPHILS # BLD: 0 K/UL (ref 0–0.1)
BASOPHILS NFR BLD: 0 % (ref 0–2)
BILIRUB SERPL-MCNC: 0.2 MG/DL (ref 0.2–1)
BILIRUB UR QL: NEGATIVE
BUN SERPL-MCNC: 15 MG/DL (ref 7–18)
BUN/CREAT SERPL: 13 (ref 12–20)
CALCIUM SERPL-MCNC: 9.4 MG/DL (ref 8.5–10.1)
CHLORIDE SERPL-SCNC: 104 MMOL/L (ref 100–111)
CO2 SERPL-SCNC: 29 MMOL/L (ref 21–32)
COLOR UR: YELLOW
CREAT SERPL-MCNC: 1.17 MG/DL (ref 0.6–1.3)
DIFFERENTIAL METHOD BLD: ABNORMAL
EOSINOPHIL # BLD: 0.3 K/UL (ref 0–0.4)
EOSINOPHIL NFR BLD: 5 % (ref 0–5)
ERYTHROCYTE [DISTWIDTH] IN BLOOD BY AUTOMATED COUNT: 15 % (ref 11.6–14.5)
GLOBULIN SER CALC-MCNC: 4 G/DL (ref 2–4)
GLUCOSE SERPL-MCNC: 89 MG/DL (ref 74–99)
GLUCOSE UR STRIP.AUTO-MCNC: 500 MG/DL
HCT VFR BLD AUTO: 39.3 % (ref 35–45)
HGB BLD-MCNC: 11.8 G/DL (ref 12–16)
HGB UR QL STRIP: NEGATIVE
KETONES UR QL STRIP.AUTO: NEGATIVE MG/DL
LACTATE BLD-SCNC: 1.81 MMOL/L (ref 0.4–2)
LACTATE BLD-SCNC: 2.06 MMOL/L (ref 0.4–2)
LEUKOCYTE ESTERASE UR QL STRIP.AUTO: NEGATIVE
LIPASE SERPL-CCNC: 175 U/L (ref 73–393)
LYMPHOCYTES # BLD: 2 K/UL (ref 0.9–3.6)
LYMPHOCYTES NFR BLD: 37 % (ref 21–52)
MCH RBC QN AUTO: 25.8 PG (ref 24–34)
MCHC RBC AUTO-ENTMCNC: 30 G/DL (ref 31–37)
MCV RBC AUTO: 85.8 FL (ref 74–97)
MONOCYTES # BLD: 0.5 K/UL (ref 0.05–1.2)
MONOCYTES NFR BLD: 9 % (ref 3–10)
NEUTS SEG # BLD: 2.7 K/UL (ref 1.8–8)
NEUTS SEG NFR BLD: 49 % (ref 40–73)
NITRITE UR QL STRIP.AUTO: NEGATIVE
PH UR STRIP: 5.5 [PH] (ref 5–8)
PLATELET # BLD AUTO: 297 K/UL (ref 135–420)
PMV BLD AUTO: 10.5 FL (ref 9.2–11.8)
POTASSIUM SERPL-SCNC: 4.1 MMOL/L (ref 3.5–5.5)
PROT SERPL-MCNC: 7.4 G/DL (ref 6.4–8.2)
PROT UR STRIP-MCNC: NEGATIVE MG/DL
RBC # BLD AUTO: 4.58 M/UL (ref 4.2–5.3)
SODIUM SERPL-SCNC: 139 MMOL/L (ref 136–145)
SP GR UR REFRACTOMETRY: 1.01 (ref 1–1.03)
UROBILINOGEN UR QL STRIP.AUTO: 0.2 EU/DL (ref 0.2–1)
WBC # BLD AUTO: 5.4 K/UL (ref 4.6–13.2)

## 2020-11-24 PROCEDURE — 83690 ASSAY OF LIPASE: CPT

## 2020-11-24 PROCEDURE — 74177 CT ABD & PELVIS W/CONTRAST: CPT

## 2020-11-24 PROCEDURE — 85025 COMPLETE CBC W/AUTO DIFF WBC: CPT

## 2020-11-24 PROCEDURE — 74011000636 HC RX REV CODE- 636: Performed by: STUDENT IN AN ORGANIZED HEALTH CARE EDUCATION/TRAINING PROGRAM

## 2020-11-24 PROCEDURE — 81003 URINALYSIS AUTO W/O SCOPE: CPT

## 2020-11-24 PROCEDURE — 80053 COMPREHEN METABOLIC PANEL: CPT

## 2020-11-24 PROCEDURE — 99285 EMERGENCY DEPT VISIT HI MDM: CPT

## 2020-11-24 PROCEDURE — 96376 TX/PRO/DX INJ SAME DRUG ADON: CPT

## 2020-11-24 PROCEDURE — 74011250636 HC RX REV CODE- 250/636: Performed by: NURSE PRACTITIONER

## 2020-11-24 PROCEDURE — 96361 HYDRATE IV INFUSION ADD-ON: CPT

## 2020-11-24 PROCEDURE — 96375 TX/PRO/DX INJ NEW DRUG ADDON: CPT

## 2020-11-24 PROCEDURE — 83605 ASSAY OF LACTIC ACID: CPT

## 2020-11-24 PROCEDURE — 96374 THER/PROPH/DIAG INJ IV PUSH: CPT

## 2020-11-24 RX ORDER — NALOXONE HYDROCHLORIDE 0.4 MG/ML
0.4 INJECTION, SOLUTION INTRAMUSCULAR; INTRAVENOUS; SUBCUTANEOUS AS NEEDED
Status: DISCONTINUED | OUTPATIENT
Start: 2020-11-24 | End: 2020-11-24 | Stop reason: HOSPADM

## 2020-11-24 RX ORDER — DICYCLOMINE HYDROCHLORIDE 20 MG/1
20 TABLET ORAL
Qty: 20 TAB | Refills: 0 | Status: SHIPPED | OUTPATIENT
Start: 2020-11-24 | End: 2021-01-27

## 2020-11-24 RX ORDER — MORPHINE SULFATE 4 MG/ML
4 INJECTION, SOLUTION INTRAMUSCULAR; INTRAVENOUS
Status: COMPLETED | OUTPATIENT
Start: 2020-11-24 | End: 2020-11-24

## 2020-11-24 RX ORDER — ONDANSETRON 4 MG/1
TABLET, ORALLY DISINTEGRATING ORAL
Qty: 30 TAB | Refills: 1 | Status: SHIPPED | OUTPATIENT
Start: 2020-11-24 | End: 2021-02-03 | Stop reason: SDUPTHER

## 2020-11-24 RX ORDER — ONDANSETRON 2 MG/ML
4 INJECTION INTRAMUSCULAR; INTRAVENOUS
Status: COMPLETED | OUTPATIENT
Start: 2020-11-24 | End: 2020-11-24

## 2020-11-24 RX ADMIN — SODIUM CHLORIDE 1000 ML: 900 INJECTION, SOLUTION INTRAVENOUS at 16:50

## 2020-11-24 RX ADMIN — MORPHINE SULFATE 4 MG: 4 INJECTION, SOLUTION INTRAMUSCULAR; INTRAVENOUS at 19:18

## 2020-11-24 RX ADMIN — IOPAMIDOL 96 ML: 612 INJECTION, SOLUTION INTRAVENOUS at 19:00

## 2020-11-24 RX ADMIN — ONDANSETRON 4 MG: 2 INJECTION INTRAMUSCULAR; INTRAVENOUS at 16:53

## 2020-11-24 RX ADMIN — MORPHINE SULFATE 4 MG: 4 INJECTION, SOLUTION INTRAMUSCULAR; INTRAVENOUS at 16:55

## 2020-11-24 NOTE — ED PROVIDER NOTES
EMERGENCY DEPARTMENT HISTORY AND PHYSICAL EXAM    4:33 PM      Date: 11/24/2020  Patient Name: David Jack    History of Presenting Illness     Chief Complaint   Patient presents with    Abdominal Pain         History Provided By: Patient    Additional History (Context): David Jack is a 61 y.o. female with History of adhesions, diabetes, hypertension, bowel obstruction who presents with complaint of abdominal distention, nausea, vomiting that started earlier today after patient had breakfast and lunch. Patient reports she noticed worsening abdominal pain and threw up her breakfast and lunch content. Patient denies any blood in her emesis. Patient reports she had 2 bowel movements today and they were normal.  Patient denies diarrhea or constipation or blood in stool. Patient reports she is concerned she may have another bowel obstruction. Patient reports she has been in the ER multiple times due to her abdominal pain. Patient denies any chest pain, shortness of breath, chills, fevers. Pt has not taken any medications for her symptoms. PCP: Suly Beltre MD    Current Facility-Administered Medications   Medication Dose Route Frequency Provider Last Rate Last Dose    sodium chloride 0.9 % bolus infusion 1,000 mL  1,000 mL IntraVENous ONCE Polly Onofre, NP        naloxone [facility]) injection 0.4 mg  0.4 mg IntraVENous PRN Tanda Phlegm, DO         Current Outpatient Medications   Medication Sig Dispense Refill    ondansetron (Zofran ODT) 4 mg disintegrating tablet Take 1 tablets every 6-8 hours as needed for nausea and vomiting. 30 Tab 1    dicyclomine (BENTYL) 20 mg tablet Take 1 Tab by mouth every six (6) hours as needed for Abdominal Cramps. 20 Tab 0    omeprazole (PRILOSEC) 10 mg capsule take 1 capsule by mouth once daily 90 Cap 4    dapagliflozin (FARXIGA) 10 mg tab tablet Take 10 mg by mouth Every morning.       metFORMIN ER (GLUCOPHAGE XR) 500 mg tablet take 2 tablets by mouth twice a day 120 Tab 5    bisoprolol-hydroCHLOROthiazide (ZIAC) 2.5-6.25 mg per tablet take 1 tablet by mouth once daily 90 Tab 3    doxepin (SINEquan) 25 mg capsule Take 1 Cap by mouth nightly. 90 Cap 3    amLODIPine (NORVASC) 5 mg tablet Take 1 Tab by mouth daily. 30 Tab 0    albuterol sulfate (PROAIR RESPICLICK) 90 mcg/actuation aepb Take 2 Puffs by inhalation every four (4) hours as needed for Wheezing, Shortness of Breath or Cough. 1 Inhaler 0    albuterol (PROVENTIL VENTOLIN) 2.5 mg /3 mL (0.083 %) nebu 3 mL by Nebulization route every six (6) hours as needed for Wheezing. 30 Nebule 1    multivitamin (ONE A DAY) tablet Take 1 Tab by mouth daily.       glucose blood VI test strips (ONETOUCH ULTRA TEST) strip Check blood glucose as directed 100 Strip 1       Past History     Past Medical History:  Past Medical History:   Diagnosis Date    Abdominal adhesions     Adnexal cyst 7/30/2019    Left    Anemia     Asthma     Chronic pelvic pain in female 7/30/2019    Diabetes mellitus     Dyskinesia     bilateral    Essential hypertension     GERD (gastroesophageal reflux disease)     Hypertension     Menopause     Microhematuria     Rheumatoid arteritis ([address] 2018    Stool color black        Past Surgical History:  Past Surgical History:   Procedure Laterality Date    COLONOSCOPY N/A 1/21/2019    COLONOSCOPY performed by Jose F Garvin [facility] at [facility] ENDOSCOPY    HX CHOLECYSTECTOMY  6/28/10    HX COLONOSCOPY      HX ENDOSCOPY      HX HERNIA REPAIR      HX HYSTERECTOMY  1989    Fibroids    HX KNEE REPLACEMENT Left     HX KNEE REPLACEMENT Right 06/2018    HX OOPHORECTOMY  12/2000    HX ORTHOPAEDIC Right     ankle- multiple surgeries    HX SMALL BOWEL RESECTION  12/2000    HX SMALL BOWEL RESECTION  02/21/2017    Dr. Eunice Fairchild         Family History:  Family History   Problem Relation Age of Onset    Hypertension Other         parent    Breast Cancer Other 21    Heart Disease Father     Diabetes Father     Lung Disease Father     Diabetes Mother     Hypertension Other         sibling    Diabetes Other         parent    Kidney Disease Brother     Diabetes Brother     Lung Disease Brother        Social History:  Social History     Tobacco Use    Smoking status: Never Smoker    Smokeless tobacco: Never Used   Substance Use Topics    Alcohol use: No    Drug use: No       Allergies: Allergies   Allergen Reactions    Macrodantin [Nitrofurantoin Macrocrystalline] Itching and Other (comments)    Tape [Adhesive] Other (comments)     Paper tape-- feels like burning skin  Burned skin when had wound vac         Review of Systems       Review of Systems   Constitutional: Negative for chills and fever. Respiratory: Negative for chest tightness and shortness of breath. Cardiovascular: Negative for chest pain and leg swelling. Gastrointestinal: Positive for abdominal pain, nausea and vomiting. Endocrine: Negative for cold intolerance. Musculoskeletal: Negative for arthralgias. Skin: Negative for color change and rash. Neurological: Negative for dizziness, weakness and light-headedness. All other systems reviewed and are negative. Physical Exam     Visit Vitals  /85   Pulse 97   Temp 98 °F (36.7 °C)   Resp 18   Ht 5' 4\" (1.626 m)   Wt 65.8 kg (145 lb)   SpO2 100%   BMI 24.89 kg/m²         Physical Exam  Vitals signs reviewed. Constitutional:       General: She is not in acute distress. Appearance: Normal appearance. She is well-developed and overweight. She is not ill-appearing or toxic-appearing. Comments: Non toxic looking. HENT:      Head: Normocephalic and atraumatic. Eyes:      Conjunctiva/sclera: Conjunctivae normal.      Pupils: Pupils are equal, round, and reactive to light. Neck:      Musculoskeletal: Normal range of motion.       Trachea: Trachea normal.   Cardiovascular:      Rate and Rhythm: Normal rate and regular rhythm. Heart sounds: Normal heart sounds. Pulmonary:      Effort: Pulmonary effort is normal.      Breath sounds: Normal breath sounds. Abdominal:      General: Bowel sounds are increased. There is distension. There is no abdominal bruit. Palpations: Abdomen is not rigid. There is no shifting dullness, fluid wave, mass or pulsatile mass. Tenderness: There is generalized abdominal tenderness and tenderness in the right lower quadrant. There is no guarding or rebound. Negative signs include Feldman's sign and McBurney's sign. Comments: Generalized abdominal tenderness on palpation but worse in the right lower quadrant. No abdominal masses or rigidity. Musculoskeletal:      Right lower leg: No edema. Left lower leg: No edema. Neurological:      General: No focal deficit present. Mental Status: She is alert and oriented to person, place, and time. Mental status is at baseline. Diagnostic Study Results     Labs -  Recent Results (from the past 12 hour(s))   METABOLIC PANEL, COMPREHENSIVE    Collection Time: 11/24/20  4:45 PM   Result Value Ref Range    Sodium 139 136 - 145 mmol/L    Potassium 4.1 3.5 - 5.5 mmol/L    Chloride 104 100 - 111 mmol/L    CO2 29 21 - 32 mmol/L    Anion gap 6 3.0 - 18 mmol/L    Glucose 89 74 - 99 mg/dL    BUN 15 7.0 - 18 MG/DL    Creatinine 1.17 0.6 - 1.3 MG/DL    BUN/Creatinine ratio 13 12 - 20      GFR est AA 57 (L) >60 ml/min/1.73m2    GFR est non-AA 47 (L) >60 ml/min/1.73m2    Calcium 9.4 8.5 - 10.1 MG/DL    Bilirubin, total 0.2 0.2 - 1.0 MG/DL    ALT (SGPT) 26 13 - 56 U/L    AST (SGOT) 12 10 - 38 U/L    Alk.  phosphatase 121 (H) 45 - 117 U/L    Protein, total 7.4 6.4 - 8.2 g/dL    Albumin 3.4 3.4 - 5.0 g/dL    Globulin 4.0 2.0 - 4.0 g/dL    A-G Ratio 0.9 0.8 - 1.7     LIPASE    Collection Time: 11/24/20  4:45 PM   Result Value Ref Range    Lipase 175 73 - 393 U/L   CBC WITH AUTOMATED DIFF    Collection Time: 11/24/20  4:45 PM   Result Value Ref Range    WBC 5.4 4.6 - 13.2 K/uL    RBC 4.58 4.20 - 5.30 M/uL    HGB 11.8 (L) 12.0 - 16.0 g/dL    HCT 39.3 35.0 - 45.0 %    MCV 85.8 74.0 - 97.0 FL    MCH 25.8 24.0 - 34.0 PG    MCHC 30.0 (L) 31.0 - 37.0 g/dL    RDW 15.0 (H) 11.6 - 14.5 %    PLATELET 054 750 - 956 K/uL    MPV 10.5 9.2 - 11.8 FL    NEUTROPHILS 49 40 - 73 %    LYMPHOCYTES 37 21 - 52 %    MONOCYTES 9 3 - 10 %    EOSINOPHILS 5 0 - 5 %    BASOPHILS 0 0 - 2 %    ABS. NEUTROPHILS 2.7 1.8 - 8.0 K/UL    ABS. LYMPHOCYTES 2.0 0.9 - 3.6 K/UL    ABS. MONOCYTES 0.5 0.05 - 1.2 K/UL    ABS. EOSINOPHILS 0.3 0.0 - 0.4 K/UL    ABS. BASOPHILS 0.0 0.0 - 0.1 K/UL    DF AUTOMATED     POC LACTIC ACID    Collection Time: 11/24/20  4:51 PM   Result Value Ref Range    Lactic Acid (POC) 2.06 (HH) 0.40 - 2.00 mmol/L   URINALYSIS W/ RFLX MICROSCOPIC    Collection Time: 11/24/20  5:50 PM   Result Value Ref Range    Color YELLOW      Appearance CLEAR      Specific gravity 1.008 1.005 - 1.030      pH (UA) 5.5 5.0 - 8.0      Protein Negative NEG mg/dL    Glucose 500 (A) NEG mg/dL    Ketone Negative NEG mg/dL    Bilirubin Negative NEG      Blood Negative NEG      Urobilinogen 0.2 0.2 - 1.0 EU/dL    Nitrites Negative NEG      Leukocyte Esterase Negative NEG     POC LACTIC ACID    Collection Time: 11/24/20  8:41 PM   Result Value Ref Range    Lactic Acid (POC) 1.81 0.40 - 2.00 mmol/L       Radiologic Studies -   CT ABD PELV W CONT    (Results Pending)         Medical Decision Making   I am the first provider for this patient. I reviewed the vital signs, available nursing notes, past medical history, past surgical history, family history and social history. Vital Signs-Reviewed the patient's vital signs.     Records Reviewed: Nursing Notes and Old Medical Records (Time of Review: 4:33 PM)    ED Course: Progress Notes, Reevaluation, and Consults:      Provider Notes (Medical Decision Making):   61 y.o. female with History of adhesions, diabetes, hypertension, bowel obstruction who presents with complaint of abdominal distention, nausea, vomiting that started earlier today after patient had breakfast and lunch. Patient reports she noticed worsening abdominal pain and threw up her breakfast and lunch content. Patient reports she had sausage for breakfast and a banana for lunch. Patient denies any blood in her emesis. Patient reports she had 2 bowel movements today and they were normal.  Patient denies diarrhea or constipation or blood in stool. Patient reports she is concerned she may have another bowel obstruction. Patient reports she has been in the ER multiple times due to her abdominal pain. Patient denies any chest pain, shortness of breath, chills, fevers. Pt has not taken any medications for her symptoms. Patient nontoxic looking, vital signs were normal.  No hypotension or fevers. I do not suspect sepsis. Patient did not have any episodes of emesis during visit however she was gagging when she first arrived due to nausea. Patient was complaining of right lower quadrant pain in her abdomen. Patient reports she has been dealing with GI issues for a while now. She was scared about having another small bowel obstruction. Due to discomfort CT ordered to rule out SBO or other acute abdomen like appendicitis due to the RLQ pain. Patient's WBC was 5.4, hgb 11.8, creat 1.17, no renal failure. Initial lactic was slightly elevated at 2.08 which came down after fluids to 1.85. Pt treated for her pain and nausea with success. Pt no longer had nausea after zofran. Morphine relieved her symptoms. Pt reports she has no pain medications at home. Patient sees Dr. Melba Acevedo for her GI problems. CT preliminary result showed no acute abdomen or bowel obstruction. Pending final read. Prior to discharge patient was no longer symptomatic for her initial complaint. Patient stated she felt a lot better.   Patient given Bentyl for home for abdominal cramping pain advised to follow-up with her GI doctor and PCP. Patient given strict return precaution her symptoms worsen. Patient stable prior to discharge. Diagnosis     Clinical Impression:   1. Right lower quadrant abdominal pain    2.  Non-intractable vomiting with nausea, unspecified vomiting type        Disposition: home     Follow-up Information     Follow up With Specialties Details Why [facility] EMERGENCY DEPT Emergency Medicine  If symptoms worsen [address]  [phone]    Claudene Even, MD Family Medicine, Internal Medicine Schedule an appointment as soon as possible for a visit in 1 day  [address] Saira Cabrera MD Gastroenterology, Internal Medicine Schedule an appointment as soon as possible for a visit in 1 day  [facility]  [address]  [phone]             Discharge Medication List as of 11/24/2020  8:50 PM      START taking these medications    Details   dicyclomine (BENTYL) 20 mg tablet Take 1 Tab by mouth every six (6) hours as needed for Abdominal Cramps., Normal, Disp-20 Tab,R-0         CONTINUE these medications which have CHANGED    Details   ondansetron (Zofran ODT) 4 mg disintegrating tablet Take 1 tablets every 6-8 hours as needed for nausea and vomiting., Normal, Disp-30 Tab,R-1         CONTINUE these medications which have NOT CHANGED    Details   omeprazole (PRILOSEC) 10 mg capsule take 1 capsule by mouth once daily, Normal, Disp-90 Cap,R-4      dapagliflozin (FARXIGA) 10 mg tab tablet Take 10 mg by mouth Every morning., Historical Med      metFORMIN ER (GLUCOPHAGE XR) 500 mg tablet take 2 tablets by mouth twice a day, Normal, Disp-120 Tab,R-5      bisoprolol-hydroCHLOROthiazide (ZIAC) 2.5-6.25 mg per tablet take 1 tablet by mouth once daily, Normal, Disp-90 Tab, R-3      doxepin (SINEquan) 25 mg capsule Take 1 Cap by mouth nightly., Normal, Disp-90 Cap, R-3      amLODIPine (NORVASC) 5 mg tablet Take 1 Tab by mouth daily. , Normal, Disp-30 Tab, R-0      albuterol sulfate (PROAIR RESPICLICK) 90 mcg/actuation aepb Take 2 Puffs by inhalation every four (4) hours as needed for Wheezing, Shortness of Breath or Cough., Normal, Disp-1 Inhaler, R-0      albuterol (PROVENTIL VENTOLIN) 2.5 mg /3 mL (0.083 %) nebu 3 mL by Nebulization route every six (6) hours as needed for Wheezing., Normal, Disp-30 Nebule, R-1      multivitamin (ONE A DAY) tablet Take 1 Tab by mouth daily. , Historical Med      glucose blood VI test strips (ONETOUCH ULTRA TEST) strip Check blood glucose as directed, Normal, Disp-100 Strip, R-1             Dictation disclaimer:  Please note that this dictation was completed with [employer], the [employer] voice recognition software. Quite often unanticipated grammatical, syntax, homophones, and other interpretive errors are inadvertently transcribed by the computer software. Please disregard these errors. Please excuse any errors that have escaped final proofreading.

## 2020-11-25 ENCOUNTER — PATIENT OUTREACH (OUTPATIENT)
Dept: CASE MANAGEMENT | Age: 60
End: 2020-11-25

## 2020-11-25 NOTE — ED NOTES
Pt in no distress at time of dc, ambulatory to ED lobby to meet  (vijay). Pt discharge instruct given by thu beach.

## 2020-11-25 NOTE — DISCHARGE INSTRUCTIONS
Patient Education        Abdominal Pain: Care Instructions  Your Care Instructions     Abdominal pain has many possible causes. Some aren't serious and get better on their own in a few days. Others need more testing and treatment. If your pain continues or gets worse, you need to be rechecked and may need more tests to find out what is wrong. You may need surgery to correct the problem. Don't ignore new symptoms, such as fever, nausea and vomiting, urination problems, pain that gets worse, and dizziness. These may be signs of a more serious problem. Your doctor may have recommended a follow-up visit in the next 8 to 12 hours. If you are not getting better, you may need more tests or treatment. The doctor has checked you carefully, but problems can develop later. If you notice any problems or new symptoms, get medical treatment right away. Follow-up care is a key part of your treatment and safety. Be sure to make and go to all appointments, and call your doctor if you are having problems. It's also a good idea to know your test results and keep a list of the medicines you take. How can you care for yourself at home? · Rest until you feel better. · To prevent dehydration, drink plenty of fluids, enough so that your urine is light yellow or clear like water. Choose water and other caffeine-free clear liquids until you feel better. If you have kidney, heart, or liver disease and have to limit fluids, talk with your doctor before you increase the amount of fluids you drink. · If your stomach is upset, eat mild foods, such as rice, dry toast or crackers, bananas, and applesauce. Try eating several small meals instead of two or three large ones. · Wait until 48 hours after all symptoms have gone away before you have spicy foods, alcohol, and drinks that contain caffeine. · Do not eat foods that are high in fat. · Avoid anti-inflammatory medicines such as aspirin, ibuprofen (Advil, Motrin), and naproxen (Aleve). These can cause stomach upset. Talk to your doctor if you take daily aspirin for another health problem. When should you call for help? Call 911 anytime you think you may need emergency care. For example, call if:    · You passed out (lost consciousness).     · You pass maroon or very bloody stools.     · You vomit blood or what looks like coffee grounds.     · You have new, severe belly pain. Call your doctor now or seek immediate medical care if:    · Your pain gets worse, especially if it becomes focused in one area of your belly.     · You have a new or higher fever.     · Your stools are black and look like tar, or they have streaks of blood.     · You have unexpected vaginal bleeding.     · You have symptoms of a urinary tract infection. These may include:  ? Pain when you urinate. ? Urinating more often than usual.  ? Blood in your urine.     · You are dizzy or lightheaded, or you feel like you may faint. Watch closely for changes in your health, and be sure to contact your doctor if:    · You are not getting better after 1 day (24 hours). Where can you learn more? Go to http://www.gray.com/  Enter N164 in the search box to learn more about \"Abdominal Pain: Care Instructions. \"  Current as of: June 26, 2019               Content Version: 12.6  © 8817-5315 [employer]. Care instructions adapted under license by [employer] (which disclaims liability or warranty for this information). If you have questions about a medical condition or this instruction, always ask your healthcare professional. [place] [address] any warranty or liability for your use of this information. Patient Education        Nausea and Vomiting: Care Instructions  Your Care Instructions     When you are nauseated, you may feel weak and sweaty and notice a lot of saliva in your mouth. Nausea often leads to vomiting.  Most of the time you do not need to worry about nausea and vomiting, but they can be signs of other illnesses. Two common causes of nausea and vomiting are stomach flu and food poisoning. Nausea and vomiting from viral stomach flu will usually start to improve within 24 hours. Nausea and vomiting from food poisoning may last from 12 to 48 hours. The doctor has checked you carefully, but problems can develop later. If you notice any problems or new symptoms, get medical treatment right away. Follow-up care is a key part of your treatment and safety. Be sure to make and go to all appointments, and call your doctor if you are having problems. It's also a good idea to know your test results and keep a list of the medicines you take. How can you care for yourself at home? · To prevent dehydration, drink plenty of fluids, enough so that your urine is light yellow or clear like water. Choose water and other caffeine-free clear liquids until you feel better. If you have kidney, heart, or liver disease and have to limit fluids, talk with your doctor before you increase the amount of fluids you drink. · Rest in bed until you feel better. · When you are able to eat, try clear soups, mild foods, and liquids until all symptoms are gone for 12 to 48 hours. Other good choices include dry toast, crackers, cooked cereal, and gelatin dessert, such as Jell-O. When should you call for help? Call 911 anytime you think you may need emergency care. For example, call if:    · You passed out (lost consciousness). Call your doctor now or seek immediate medical care if:    · You have symptoms of dehydration, such as:  ? Dry eyes and a dry mouth. ? Passing only a little dark urine. ? Feeling thirstier than usual.     · You have new or worsening belly pain.     · You have a new or higher fever.     · You vomit blood or what looks like coffee grounds.    Watch closely for changes in your health, and be sure to contact your doctor if:    · You have ongoing nausea and vomiting.     · Your vomiting is getting worse.     · Your vomiting lasts longer than 2 days.     · You are not getting better as expected. Where can you learn more? Go to http://www.[employer].com/  Enter H591 in the search box to learn more about \"Nausea and Vomiting: Care Instructions. \"  Current as of: June 26, 2019               Content Version: 12.6  © 4446-4091 [employer]. Care instructions adapted under license by [employer] (which disclaims liability or warranty for this information). If you have questions about a medical condition or this instruction, always ask your healthcare professional. [place] [address] any warranty or liability for your use of this information. follow up with GI doctor and PCP for further evaluation and pain management. Return to ED if symptoms worsen.

## 2020-11-27 ENCOUNTER — PATIENT OUTREACH (OUTPATIENT)
Dept: CASE MANAGEMENT | Age: 60
End: 2020-11-27

## 2020-11-27 NOTE — PROGRESS NOTES
Date/Time:  11/27/2020 2:27 PM  Attempted to reach patient by telephone. Left HIPPA compliant message requesting a return call. This is second attempt to contact patient. Episode resolved.

## 2020-11-29 NOTE — ED TRIAGE NOTES
Chronic pain, had EGD for abdominal pain last month. Has not been adhering to high fiber diet. Thinks having RA flare. Right knee to hip pain. schedueled Monday for injection in right hip no discharge, no irritation, no pain, no redness, and no visual changes.

## 2020-12-03 ENCOUNTER — APPOINTMENT (OUTPATIENT)
Dept: GENERAL RADIOLOGY | Age: 60
End: 2020-12-03
Attending: PHYSICIAN ASSISTANT
Payer: COMMERCIAL

## 2020-12-03 ENCOUNTER — HOSPITAL ENCOUNTER (EMERGENCY)
Age: 60
Discharge: HOME | End: 2020-12-03
Attending: EMERGENCY MEDICINE
Payer: COMMERCIAL

## 2020-12-03 VITALS
BODY MASS INDEX: 25.61 KG/M2 | OXYGEN SATURATION: 100 % | HEIGHT: 64 IN | TEMPERATURE: 98.3 F | DIASTOLIC BLOOD PRESSURE: 101 MMHG | SYSTOLIC BLOOD PRESSURE: 143 MMHG | RESPIRATION RATE: 20 BRPM | HEART RATE: 95 BPM | WEIGHT: 150 LBS

## 2020-12-03 DIAGNOSIS — R10.84 ABDOMINAL PAIN, GENERALIZED: Primary | ICD-10-CM

## 2020-12-03 LAB
ALBUMIN SERPL-MCNC: 3.8 G/DL (ref 3.4–5)
ALBUMIN/GLOB SERPL: 0.9 {RATIO} (ref 0.8–1.7)
ALP SERPL-CCNC: 166 U/L (ref 45–117)
ALT SERPL-CCNC: 26 U/L (ref 13–56)
ANION GAP SERPL CALC-SCNC: 4 MMOL/L (ref 3–18)
APPEARANCE UR: CLEAR
AST SERPL-CCNC: 14 U/L (ref 10–38)
ATRIAL RATE: 88 BPM
BACTERIA URNS QL MICRO: ABNORMAL /HPF
BASOPHILS # BLD: 0 K/UL (ref 0–0.1)
BASOPHILS NFR BLD: 0 % (ref 0–2)
BILIRUB SERPL-MCNC: 0.2 MG/DL (ref 0.2–1)
BILIRUB UR QL: NEGATIVE
BUN SERPL-MCNC: 14 MG/DL (ref 7–18)
BUN/CREAT SERPL: 12 (ref 12–20)
CALCIUM SERPL-MCNC: 9.8 MG/DL (ref 8.5–10.1)
CALCULATED P AXIS, ECG09: 63 DEGREES
CALCULATED R AXIS, ECG10: 50 DEGREES
CALCULATED T AXIS, ECG11: 87 DEGREES
CHLORIDE SERPL-SCNC: 103 MMOL/L (ref 100–111)
CK MB CFR SERPL CALC: NORMAL % (ref 0–4)
CK MB SERPL-MCNC: <1 NG/ML (ref 5–25)
CK SERPL-CCNC: 59 U/L (ref 26–192)
CO2 SERPL-SCNC: 31 MMOL/L (ref 21–32)
COLOR UR: YELLOW
CREAT SERPL-MCNC: 1.15 MG/DL (ref 0.6–1.3)
DIAGNOSIS, 93000: NORMAL
DIFFERENTIAL METHOD BLD: ABNORMAL
EOSINOPHIL # BLD: 0.2 K/UL (ref 0–0.4)
EOSINOPHIL NFR BLD: 4 % (ref 0–5)
EPITH CASTS URNS QL MICRO: ABNORMAL /LPF (ref 0–5)
ERYTHROCYTE [DISTWIDTH] IN BLOOD BY AUTOMATED COUNT: 14.7 % (ref 11.6–14.5)
GLOBULIN SER CALC-MCNC: 4.4 G/DL (ref 2–4)
GLUCOSE SERPL-MCNC: 103 MG/DL (ref 74–99)
GLUCOSE UR STRIP.AUTO-MCNC: 500 MG/DL
HCT VFR BLD AUTO: 47.2 % (ref 35–45)
HGB BLD-MCNC: 14.2 G/DL (ref 12–16)
HGB UR QL STRIP: NEGATIVE
KETONES UR QL STRIP.AUTO: NEGATIVE MG/DL
LEUKOCYTE ESTERASE UR QL STRIP.AUTO: ABNORMAL
LIPASE SERPL-CCNC: 146 U/L (ref 73–393)
LYMPHOCYTES # BLD: 1.5 K/UL (ref 0.9–3.6)
LYMPHOCYTES NFR BLD: 30 % (ref 21–52)
MCH RBC QN AUTO: 25.9 PG (ref 24–34)
MCHC RBC AUTO-ENTMCNC: 30.1 G/DL (ref 31–37)
MCV RBC AUTO: 86.1 FL (ref 74–97)
MONOCYTES # BLD: 0.4 K/UL (ref 0.05–1.2)
MONOCYTES NFR BLD: 8 % (ref 3–10)
NEUTS SEG # BLD: 3 K/UL (ref 1.8–8)
NEUTS SEG NFR BLD: 58 % (ref 40–73)
NITRITE UR QL STRIP.AUTO: NEGATIVE
P-R INTERVAL, ECG05: 132 MS
PH UR STRIP: 6.5 [PH] (ref 5–8)
PLATELET # BLD AUTO: 323 K/UL (ref 135–420)
PMV BLD AUTO: 10.4 FL (ref 9.2–11.8)
POTASSIUM SERPL-SCNC: 3.9 MMOL/L (ref 3.5–5.5)
PROT SERPL-MCNC: 8.2 G/DL (ref 6.4–8.2)
PROT UR STRIP-MCNC: NEGATIVE MG/DL
Q-T INTERVAL, ECG07: 352 MS
QRS DURATION, ECG06: 70 MS
QTC CALCULATION (BEZET), ECG08: 425 MS
RBC # BLD AUTO: 5.48 M/UL (ref 4.2–5.3)
RBC #/AREA URNS HPF: ABNORMAL /HPF (ref 0–5)
SODIUM SERPL-SCNC: 138 MMOL/L (ref 136–145)
SP GR UR REFRACTOMETRY: 1.02 (ref 1–1.03)
TROPONIN I SERPL-MCNC: <0.02 NG/ML (ref 0–0.04)
UROBILINOGEN UR QL STRIP.AUTO: 0.2 EU/DL (ref 0.2–1)
VENTRICULAR RATE, ECG03: 88 BPM
WBC # BLD AUTO: 5.1 K/UL (ref 4.6–13.2)
WBC URNS QL MICRO: ABNORMAL /HPF (ref 0–4)

## 2020-12-03 PROCEDURE — 74011636637 HC RX REV CODE- 636/637: Performed by: PHYSICIAN ASSISTANT

## 2020-12-03 PROCEDURE — 96374 THER/PROPH/DIAG INJ IV PUSH: CPT

## 2020-12-03 PROCEDURE — 83690 ASSAY OF LIPASE: CPT

## 2020-12-03 PROCEDURE — 93005 ELECTROCARDIOGRAM TRACING: CPT

## 2020-12-03 PROCEDURE — 85025 COMPLETE CBC W/AUTO DIFF WBC: CPT

## 2020-12-03 PROCEDURE — 74011250636 HC RX REV CODE- 250/636

## 2020-12-03 PROCEDURE — 81001 URINALYSIS AUTO W/SCOPE: CPT

## 2020-12-03 PROCEDURE — 71045 X-RAY EXAM CHEST 1 VIEW: CPT

## 2020-12-03 PROCEDURE — 82550 ASSAY OF CK (CPK): CPT

## 2020-12-03 PROCEDURE — 94640 AIRWAY INHALATION TREATMENT: CPT

## 2020-12-03 PROCEDURE — 74011000250 HC RX REV CODE- 250: Performed by: PHYSICIAN ASSISTANT

## 2020-12-03 PROCEDURE — 99283 EMERGENCY DEPT VISIT LOW MDM: CPT

## 2020-12-03 PROCEDURE — 80053 COMPREHEN METABOLIC PANEL: CPT

## 2020-12-03 RX ORDER — IPRATROPIUM BROMIDE AND ALBUTEROL SULFATE 2.5; .5 MG/3ML; MG/3ML
3 SOLUTION RESPIRATORY (INHALATION) ONCE
Status: COMPLETED | OUTPATIENT
Start: 2020-12-03 | End: 2020-12-03

## 2020-12-03 RX ORDER — PREDNISONE 20 MG/1
60 TABLET ORAL
Status: COMPLETED | OUTPATIENT
Start: 2020-12-03 | End: 2020-12-03

## 2020-12-03 RX ORDER — MORPHINE SULFATE 4 MG/ML
INJECTION, SOLUTION INTRAMUSCULAR; INTRAVENOUS
Status: COMPLETED
Start: 2020-12-03 | End: 2020-12-03

## 2020-12-03 RX ORDER — MORPHINE SULFATE 4 MG/ML
4 INJECTION, SOLUTION INTRAMUSCULAR; INTRAVENOUS
Status: COMPLETED | OUTPATIENT
Start: 2020-12-03 | End: 2020-12-03

## 2020-12-03 RX ADMIN — IPRATROPIUM BROMIDE AND ALBUTEROL SULFATE 3 ML: .5; 3 SOLUTION RESPIRATORY (INHALATION) at 17:58

## 2020-12-03 RX ADMIN — MORPHINE SULFATE 4 MG: 4 INJECTION, SOLUTION INTRAMUSCULAR; INTRAVENOUS at 18:40

## 2020-12-03 RX ADMIN — PREDNISONE 60 MG: 20 TABLET ORAL at 17:58

## 2020-12-03 NOTE — ED TRIAGE NOTES
Pt arrives with c/o right lower quad abdominal pain, diarrhea and cough. Pt has hx asthma and feels like she is flaring at present.

## 2020-12-04 NOTE — DISCHARGE INSTRUCTIONS

## 2020-12-04 NOTE — ED NOTES
I have reviewed discharge instructions with the patient. The patient verbalized understanding. Pt in no distress ambulatory to ed lobby to meet  ride. Pt reports pain slightly more tolerable at this time.

## 2020-12-05 NOTE — ED PROVIDER NOTES
EMERGENCY DEPARTMENT HISTORY AND PHYSICAL EXAM    Date: 12/3/2020  Patient Name: Lulu Schneider    History of Presenting Illness     Chief Complaint   Patient presents with    Abdominal Pain    Cough    Diarrhea    Wheezing         History Provided By: Patient      Additional History (Context): Lulu Schneider is a -64year-old female with significant past medical history including asthma, chronic abdominal pain, diabetes hypertension, GERD, arthritis presenting to the emergency department with complaint of abdominal pain and coughing that has been going on for months. States she was seen prior for the symptoms and had negative work-ups. PCP: Joselin Thomas MD    Current Outpatient Medications   Medication Sig Dispense Refill    ondansetron (Zofran ODT) 4 mg disintegrating tablet Take 1 tablets every 6-8 hours as needed for nausea and vomiting. 30 Tab 1    dicyclomine (BENTYL) 20 mg tablet Take 1 Tab by mouth every six (6) hours as needed for Abdominal Cramps. 20 Tab 0    omeprazole (PRILOSEC) 10 mg capsule take 1 capsule by mouth once daily 90 Cap 4    dapagliflozin (FARXIGA) 10 mg tab tablet Take 10 mg by mouth Every morning.  metFORMIN ER (GLUCOPHAGE XR) 500 mg tablet take 2 tablets by mouth twice a day 120 Tab 5    bisoprolol-hydroCHLOROthiazide (ZIAC) 2.5-6.25 mg per tablet take 1 tablet by mouth once daily 90 Tab 3    doxepin (SINEquan) 25 mg capsule Take 1 Cap by mouth nightly. 90 Cap 3    amLODIPine (NORVASC) 5 mg tablet Take 1 Tab by mouth daily. 30 Tab 0    albuterol sulfate (PROAIR RESPICLICK) 90 mcg/actuation aepb Take 2 Puffs by inhalation every four (4) hours as needed for Wheezing, Shortness of Breath or Cough. 1 Inhaler 0    albuterol (PROVENTIL VENTOLIN) 2.5 mg /3 mL (0.083 %) nebu 3 mL by Nebulization route every six (6) hours as needed for Wheezing. 30 Nebule 1    multivitamin (ONE A DAY) tablet Take 1 Tab by mouth daily.       glucose blood VI test strips (ONETOUCH ULTRA TEST) strip Check blood glucose as directed 100 Strip 1       Past History     Past Medical History:  Past Medical History:   Diagnosis Date    Abdominal adhesions     Adnexal cyst 7/30/2019    Left    Anemia     Asthma     Chronic pelvic pain in female 7/30/2019    Diabetes mellitus     Dyskinesia     bilateral    Essential hypertension     GERD (gastroesophageal reflux disease)     Hypertension     Menopause     Microhematuria     Rheumatoid arteritis ([facility] [address] 2018    Stool color black        Past Surgical History:  Past Surgical History:   Procedure Laterality Date    COLONOSCOPY N/A 1/21/2019    COLON[facility]COPY performed by Mari Garvin [facility] at [facility]COPY    HX CHOLECYSTECTOMY  6/28/10    HX COLONOSCOPY      HX ENDOSCOPY      HX HERNIA REPAIR      HX HYSTERECTOMY  1989    Fibroids    HX KNEE REPLACEMENT Left     HX KNEE REPLACEMENT Right 06/2018    HX OOPHORECTOMY  12/2000    HX ORTHOPAEDIC Right     ankle- multiple surgeries    HX SMALL BOWEL RESECTION  12/2000    HX SMALL BOWEL RESECTION  02/21/2017    Dr. Fritz Teran         Family History:  Family History   Problem Relation Age of Onset    Hypertension Other         parent    Breast Cancer Other 21    Heart Disease Father     Diabetes Father     Lung Disease Father     Diabetes Mother     Hypertension Other         sibling    Diabetes Other         parent    Kidney Disease Brother     Diabetes Brother     Lung Disease Brother        Social History:  Social History     Tobacco Use    Smoking status: Never Smoker    Smokeless tobacco: Never Used   Substance Use Topics    Alcohol use: No    Drug use: No       Allergies:   Allergies   Allergen Reactions    Macrodantin [Nitrofurantoin Macrocrystalline] Itching and Other (comments)    Tape [Adhesive] Other (comments)     Paper tape-- feels like burning skin  Burned skin when had wound vac         Review of Systems     Review of Systems   Constitutional: Negative for chills and fever. HENT: Negative for nasal congestion, sore throat, rhinorrhea  Eyes: Negative. Respiratory: negative  cough and negative for shortness of breath. Cardiovascular: Negative for chest pain and palpitations. Gastrointestinal: Positive for abdominal pain, Negative for constipation, diarrhea,  nausea and vomiting. Genitourinary: Negative. Negative for difficulty urinating and flank pain. Musculoskeletal: Negative for back pain. Negative for gait problem and neck pain. Skin: Negative. Allergic/Immunologic: Negative. Neurological: Negative for dizziness, weakness, numbness and headaches. Psychiatric/Behavioral: Negative. All other systems reviewed and are negative. All Other Systems Negative  Physical Exam     Vitals:    12/03/20 1615   BP: (!) 143/101   Pulse: 95   Resp: 20   Temp: 98.3 °F (36.8 °C)   SpO2: 100%   Weight: 68 kg (150 lb)   Height: 5' 4\" (1.626 m)     Physical Exam  Vitals signs and nursing note reviewed. Constitutional:       General: She is not in acute distress. Appearance: She is well-developed. She is not diaphoretic. HENT:      Head: Normocephalic and atraumatic. Nose: Nose normal.   Eyes:      Conjunctiva/sclera: Conjunctivae normal.      Pupils: Pupils are equal, round, and reactive to light. Neck:      Musculoskeletal: Normal range of motion and neck supple. Cardiovascular:      Rate and Rhythm: Normal rate and regular rhythm. Heart sounds: Normal heart sounds. Pulmonary:      Effort: Pulmonary effort is normal. No respiratory distress. Breath sounds: Normal breath sounds. No stridor. No wheezing, rhonchi or rales. Chest:      Chest wall: No tenderness. Abdominal:      Palpations: Abdomen is soft. Tenderness: There is generalized abdominal tenderness. There is no right CVA tenderness, left CVA tenderness, guarding or rebound.  Negative signs include Feldman's sign, Rovsing's sign, McBurney's sign, psoas sign and obturator sign. Musculoskeletal: Normal range of motion. Skin:     General: Skin is warm. Findings: No rash. Neurological:      Mental Status: She is alert and oriented to person, place, and time. Cranial Nerves: No cranial nerve deficit. Coordination: Coordination normal.   Psychiatric:         Behavior: Behavior normal.           Diagnostic Study Results     Labs -   No results found for this or any previous visit (from the past 12 hour(s)). Radiologic Studies -   XR CHEST PORT   Final Result   IMPRESSION:  No acute cardiopulmonary disease. * **  *        CT Results  (Last 48 hours)    None        CXR Results  (Last 48 hours)               12/03/20 1656  XR CHEST PORT Final result    Impression:  IMPRESSION:  No acute cardiopulmonary disease. * **  *       Narrative:  EXAM:  PORTABLE CHEST       INDICATION:  Chest pain and shortness of breath. TECHNIQUE:  Portable, AP view. COMPARISON:  PA and lateral views 01/14/2020       ____________________       FINDINGS:         SUPPORT DEVICES: None. HEART AND MEDIASTINUM: Cardiomediastinal silhouette appears within normal   limits. Normal caliber thoracic aorta. LUNGS AND PLEURAL SPACES: Lungs are well aerated with no confluent airspace   opacity or pulmonary edema. No pleural effusion or pneumothorax. BONY THORAX AND SOFT TISSUES: No acute osseous abnormality. Chronic, healed   posterior left seventh and eighth rib fractures. ____________________                   Medical Decision Making   I am the first provider for this patient. I reviewed the vital signs, available nursing notes, past medical history, past surgical history, family history and social history. Vital Signs-Reviewed the patient's vital signs.         Records Reviewed: Nursing notes, old medical records and any previous labs, imaging, visits, consultations pertinent to patient care    Procedures:  Procedures      ED Course: Progress Notes, Reevaluation, and Consults:      Provider Notes (Medical Decision Making):   Patient here with multiple chronic complaints, labs, x-ray and vital signs are stable. Patient is evaluated by Dr. Oanh Garvey as well, he agrees that patient does not need repeat CAT scan of abdomen as she had this recently that was benign. Patient at this time does not want to stay for IV fluids and will be discharged home. Will follow up with PCP in return immediately with worsening symptoms. Lungs cta and no distress     MED RECONCILIATION:  No current facility-administered medications for this encounter. Current Outpatient Medications   Medication Sig    ondansetron (Zofran ODT) 4 mg disintegrating tablet Take 1 tablets every 6-8 hours as needed for nausea and vomiting.  dicyclomine (BENTYL) 20 mg tablet Take 1 Tab by mouth every six (6) hours as needed for Abdominal Cramps.  omeprazole (PRILOSEC) 10 mg capsule take 1 capsule by mouth once daily    dapagliflozin (FARXIGA) 10 mg tab tablet Take 10 mg by mouth Every morning.  metFORMIN ER (GLUCOPHAGE XR) 500 mg tablet take 2 tablets by mouth twice a day    bisoprolol-hydroCHLOROthiazide (ZIAC) 2.5-6.25 mg per tablet take 1 tablet by mouth once daily    doxepin (SINEquan) 25 mg capsule Take 1 Cap by mouth nightly.  amLODIPine (NORVASC) 5 mg tablet Take 1 Tab by mouth daily.  albuterol sulfate (PROAIR RESPICLICK) 90 mcg/actuation aepb Take 2 Puffs by inhalation every four (4) hours as needed for Wheezing, Shortness of Breath or Cough.  albuterol (PROVENTIL VENTOLIN) 2.5 mg /3 mL (0.083 %) nebu 3 mL by Nebulization route every six (6) hours as needed for Wheezing.  multivitamin (ONE A DAY) tablet Take 1 Tab by mouth daily.  glucose blood VI test strips (ONETOUCH ULTRA TEST) strip Check blood glucose as directed       Disposition:  home    DISCHARGE NOTE:     Pt has been reexamined.   Patient has no new complaints, changes, or physical findings. Care plan outlined and precautions discussed. Discussed proper way to take medications. Discussed treatment plan, return precautions, symptomatic relief, and expected time to improvement. All questions answered. Patient is stable for discharge and outpatient management. Patient is ready to go home. Follow-up Information     Follow up With Specialties Details Why Contact Info    [facility] EMERGENCY DEPT Emergency Medicine   [address]  [phone]    Bruce Castaneda MD Family Medicine, Internal Medicine   [id number] [place] [address]  [phone]      Gastroenterolgy [address] [facility]    [facility]  [address] [facility] [address]  [address] [facility] [address]  [phone]          Discharge Medication List as of 12/3/2020  7:55 PM                Diagnosis     Clinical Impression:   1. Abdominal pain, generalized        Dictation disclaimer:  Please note that this dictation was completed with [employer], the computer voice recognition software. Quite often unanticipated grammatical, syntax, homophones, and other interpretive errors are inadvertently transcribed by the computer software. Please disregard these errors. Please excuse any errors that have escaped final proofreading.

## 2020-12-10 ENCOUNTER — HOSPITAL ENCOUNTER (EMERGENCY)
Age: 60
Discharge: HOME | End: 2020-12-10
Attending: EMERGENCY MEDICINE
Payer: COMMERCIAL

## 2020-12-10 ENCOUNTER — APPOINTMENT (OUTPATIENT)
Dept: CT IMAGING | Age: 60
End: 2020-12-10
Attending: PHYSICIAN ASSISTANT
Payer: COMMERCIAL

## 2020-12-10 ENCOUNTER — APPOINTMENT (OUTPATIENT)
Dept: GENERAL RADIOLOGY | Age: 60
End: 2020-12-10
Attending: PHYSICIAN ASSISTANT
Payer: COMMERCIAL

## 2020-12-10 VITALS
DIASTOLIC BLOOD PRESSURE: 87 MMHG | OXYGEN SATURATION: 100 % | RESPIRATION RATE: 16 BRPM | HEIGHT: 64 IN | WEIGHT: 150 LBS | BODY MASS INDEX: 25.61 KG/M2 | HEART RATE: 98 BPM | SYSTOLIC BLOOD PRESSURE: 141 MMHG | TEMPERATURE: 98.5 F

## 2020-12-10 DIAGNOSIS — N83.202 CYST OF LEFT OVARY: ICD-10-CM

## 2020-12-10 DIAGNOSIS — R10.84 GENERALIZED ABDOMINAL PAIN: Primary | ICD-10-CM

## 2020-12-10 DIAGNOSIS — R14.0 ABDOMINAL BLOATING: ICD-10-CM

## 2020-12-10 LAB
ALBUMIN SERPL-MCNC: 3.4 G/DL (ref 3.4–5)
ALBUMIN/GLOB SERPL: 0.7 {RATIO} (ref 0.8–1.7)
ALP SERPL-CCNC: 139 U/L (ref 45–117)
ALT SERPL-CCNC: 22 U/L (ref 13–56)
ANION GAP SERPL CALC-SCNC: 3 MMOL/L (ref 3–18)
APPEARANCE UR: CLEAR
AST SERPL-CCNC: 11 U/L (ref 10–38)
BASOPHILS # BLD: 0 K/UL (ref 0–0.1)
BASOPHILS NFR BLD: 0 % (ref 0–2)
BILIRUB SERPL-MCNC: 0.2 MG/DL (ref 0.2–1)
BILIRUB UR QL: NEGATIVE
BNP SERPL-MCNC: 58 PG/ML (ref 0–900)
BUN SERPL-MCNC: 17 MG/DL (ref 7–18)
BUN/CREAT SERPL: 14 (ref 12–20)
CALCIUM SERPL-MCNC: 9.4 MG/DL (ref 8.5–10.1)
CHLORIDE SERPL-SCNC: 104 MMOL/L (ref 100–111)
CO2 SERPL-SCNC: 31 MMOL/L (ref 21–32)
COLOR UR: YELLOW
CREAT SERPL-MCNC: 1.25 MG/DL (ref 0.6–1.3)
DIFFERENTIAL METHOD BLD: ABNORMAL
EOSINOPHIL # BLD: 0.2 K/UL (ref 0–0.4)
EOSINOPHIL NFR BLD: 3 % (ref 0–5)
ERYTHROCYTE [DISTWIDTH] IN BLOOD BY AUTOMATED COUNT: 14.5 % (ref 11.6–14.5)
GLOBULIN SER CALC-MCNC: 4.7 G/DL (ref 2–4)
GLUCOSE SERPL-MCNC: 150 MG/DL (ref 74–99)
GLUCOSE UR STRIP.AUTO-MCNC: >1000 MG/DL
HCT VFR BLD AUTO: 43.4 % (ref 35–45)
HGB BLD-MCNC: 13.3 G/DL (ref 12–16)
HGB UR QL STRIP: NEGATIVE
KETONES UR QL STRIP.AUTO: NEGATIVE MG/DL
LEUKOCYTE ESTERASE UR QL STRIP.AUTO: NEGATIVE
LIPASE SERPL-CCNC: 199 U/L (ref 73–393)
LYMPHOCYTES # BLD: 2.1 K/UL (ref 0.9–3.6)
LYMPHOCYTES NFR BLD: 35 % (ref 21–52)
MAGNESIUM SERPL-MCNC: 2 MG/DL (ref 1.6–2.6)
MCH RBC QN AUTO: 26.5 PG (ref 24–34)
MCHC RBC AUTO-ENTMCNC: 30.6 G/DL (ref 31–37)
MCV RBC AUTO: 86.5 FL (ref 74–97)
MONOCYTES # BLD: 0.6 K/UL (ref 0.05–1.2)
MONOCYTES NFR BLD: 9 % (ref 3–10)
NEUTS SEG # BLD: 3.1 K/UL (ref 1.8–8)
NEUTS SEG NFR BLD: 53 % (ref 40–73)
NITRITE UR QL STRIP.AUTO: NEGATIVE
PH UR STRIP: 5.5 [PH] (ref 5–8)
PLATELET # BLD AUTO: 339 K/UL (ref 135–420)
PMV BLD AUTO: 10.8 FL (ref 9.2–11.8)
POTASSIUM SERPL-SCNC: 4.2 MMOL/L (ref 3.5–5.5)
PROT SERPL-MCNC: 8.1 G/DL (ref 6.4–8.2)
PROT UR STRIP-MCNC: NEGATIVE MG/DL
RBC # BLD AUTO: 5.02 M/UL (ref 4.2–5.3)
SODIUM SERPL-SCNC: 138 MMOL/L (ref 136–145)
SP GR UR REFRACTOMETRY: 1.01 (ref 1–1.03)
TROPONIN I SERPL-MCNC: <0.02 NG/ML (ref 0–0.04)
UROBILINOGEN UR QL STRIP.AUTO: 0.2 EU/DL (ref 0.2–1)
WBC # BLD AUTO: 6 K/UL (ref 4.6–13.2)

## 2020-12-10 PROCEDURE — 93005 ELECTROCARDIOGRAM TRACING: CPT

## 2020-12-10 PROCEDURE — 83735 ASSAY OF MAGNESIUM: CPT

## 2020-12-10 PROCEDURE — 81003 URINALYSIS AUTO W/O SCOPE: CPT

## 2020-12-10 PROCEDURE — 99284 EMERGENCY DEPT VISIT MOD MDM: CPT

## 2020-12-10 PROCEDURE — 71045 X-RAY EXAM CHEST 1 VIEW: CPT

## 2020-12-10 PROCEDURE — 80053 COMPREHEN METABOLIC PANEL: CPT

## 2020-12-10 PROCEDURE — 85025 COMPLETE CBC W/AUTO DIFF WBC: CPT

## 2020-12-10 PROCEDURE — 74011000636 HC RX REV CODE- 636: Performed by: EMERGENCY MEDICINE

## 2020-12-10 PROCEDURE — 74177 CT ABD & PELVIS W/CONTRAST: CPT

## 2020-12-10 PROCEDURE — 83880 ASSAY OF NATRIURETIC PEPTIDE: CPT

## 2020-12-10 PROCEDURE — 74011250636 HC RX REV CODE- 250/636: Performed by: PHYSICIAN ASSISTANT

## 2020-12-10 PROCEDURE — 84484 ASSAY OF TROPONIN QUANT: CPT

## 2020-12-10 PROCEDURE — 83690 ASSAY OF LIPASE: CPT

## 2020-12-10 RX ADMIN — SODIUM CHLORIDE 500 ML: 900 INJECTION, SOLUTION INTRAVENOUS at 21:13

## 2020-12-10 RX ADMIN — IOPAMIDOL 100 ML: 612 INJECTION, SOLUTION INTRAVENOUS at 22:24

## 2020-12-11 LAB
ATRIAL RATE: 95 BPM
CALCULATED P AXIS, ECG09: 64 DEGREES
CALCULATED R AXIS, ECG10: 57 DEGREES
CALCULATED T AXIS, ECG11: 88 DEGREES
DIAGNOSIS, 93000: NORMAL
P-R INTERVAL, ECG05: 130 MS
Q-T INTERVAL, ECG07: 342 MS
QRS DURATION, ECG06: 82 MS
QTC CALCULATION (BEZET), ECG08: 429 MS
VENTRICULAR RATE, ECG03: 95 BPM

## 2020-12-11 NOTE — ED TRIAGE NOTES
Pt arrives through triage for c/o abdominal pain and bloating. States she is also nauseous and urinating frequently. Has been ongoing for the past week. Seen frequently for the same.

## 2020-12-11 NOTE — ED PROVIDER NOTES
EMERGENCY DEPARTMENT HISTORY AND PHYSICAL EXAM      Date: 12/10/2020  Patient Name: Bronwyn Motta    History of Presenting Illness     Chief Complaint   Patient presents with    Abdominal Pain    Fatigue       History Provided By: Patient    HPI: Bronwyn Motta, 61 y.o. female PMHx significant for anemia, asthma, abdominal adhesions, hypertension, GERD, presents ambulatory to the ED with cc of intermittent cramping to lower abdomen with assoc abd distension and nausea x 2 days. Last BM yesterday. Pt has been passing gas. Denies vomiting. Pt reports decreased appetite. Pt has been taking bentyl without relief of sx. Previous hx SBO with extensive abd adhesions. Previous abd surgeries include: hernia repair, cholecystectomy, oophorectomy, hysterectomy, small bowel resection. Most recent colonoscopy 2019. Pt reports anxiety about possible SBO due to 12 previous abdominal surgeries. There are no other complaints, changes, or physical findings at this time. PCP: Jeff Delatorre MD    No current facility-administered medications on file prior to encounter. Current Outpatient Medications on File Prior to Encounter   Medication Sig Dispense Refill    ondansetron (Zofran ODT) 4 mg disintegrating tablet Take 1 tablets every 6-8 hours as needed for nausea and vomiting. 30 Tab 1    dicyclomine (BENTYL) 20 mg tablet Take 1 Tab by mouth every six (6) hours as needed for Abdominal Cramps. 20 Tab 0    omeprazole (PRILOSEC) 10 mg capsule take 1 capsule by mouth once daily 90 Cap 4    dapagliflozin (FARXIGA) 10 mg tab tablet Take 10 mg by mouth Every morning.  metFORMIN ER (GLUCOPHAGE XR) 500 mg tablet take 2 tablets by mouth twice a day 120 Tab 5    bisoprolol-hydroCHLOROthiazide (ZIAC) 2.5-6.25 mg per tablet take 1 tablet by mouth once daily 90 Tab 3    doxepin (SINEquan) 25 mg capsule Take 1 Cap by mouth nightly. 90 Cap 3    amLODIPine (NORVASC) 5 mg tablet Take 1 Tab by mouth daily.  30 Tab 0    albuterol sulfate (PROAIR RESPICLICK) 90 mcg/actuation aepb Take 2 Puffs by inhalation every four (4) hours as needed for Wheezing, Shortness of Breath or Cough. 1 Inhaler 0    albuterol (PROVENTIL VENTOLIN) 2.5 mg /3 mL (0.083 %) nebu 3 mL by Nebulization route every six (6) hours as needed for Wheezing. 30 Nebule 1    multivitamin (ONE A DAY) tablet Take 1 Tab by mouth daily.       glucose blood VI test strips (ONETOUCH ULTRA TEST) strip Check blood glucose as directed 100 Strip 1       Past History     Past Medical History:  Past Medical History:   Diagnosis Date    Abdominal adhesions     Adnexal cyst 7/30/2019    Left    Anemia     Asthma     Chronic pelvic pain in female 7/30/2019    Diabetes mellitus     Dyskinesia     bilateral    Essential hypertension     GERD (gastroesophageal reflux disease)     Hypertension     Menopause     Microhematuria     Rheumatoid arteritis ([facility] [address] 2018    Stool color black        Past Surgical History:  Past Surgical History:   Procedure Laterality Date    COLONOSCOPY N/A 1/21/2019    COLONOSCOPY performed by Familia Garvin MD at [facility] ENDOSCOPY    HX CHOLECYSTECTOMY  6/28/10    HX COLONOSCOPY      HX ENDOSCOPY      HX HERNIA REPAIR      HX HYSTERECTOMY  1989    Fibroids    HX KNEE REPLACEMENT Left     HX KNEE REPLACEMENT Right 06/2018    HX OOPHORECTOMY  12/2000    HX ORTHOPAEDIC Right     ankle- multiple surgeries    HX SMALL BOWEL RESECTION  12/2000    HX SMALL BOWEL RESECTION  02/21/2017    Dr. Myrtle Moralez         Family History:  Family History   Problem Relation Age of Onset    Hypertension Other         parent    Breast Cancer Other 21    Heart Disease Father     Diabetes Father     Lung Disease Father     Diabetes Mother     Hypertension Other         sibling    Diabetes Other         parent    Kidney Disease Brother     Diabetes Brother     Lung Disease Brother        Social History:  Social History Tobacco Use    Smoking status: Never Smoker    Smokeless tobacco: Never Used   Substance Use Topics    Alcohol use: No    Drug use: No       Allergies: Allergies   Allergen Reactions    Macrodantin [Nitrofurantoin Macrocrystalline] Itching and Other (comments)    Tape [Adhesive] Other (comments)     Paper tape-- feels like burning skin  Burned skin when had wound vac         Review of Systems   Review of Systems   Constitutional: Negative for chills and fever. Respiratory: Negative for shortness of breath. Cardiovascular: Negative for chest pain. Gastrointestinal: Positive for abdominal pain and nausea. Negative for constipation, diarrhea and vomiting. Genitourinary: Negative for flank pain. Musculoskeletal: Negative for back pain and myalgias. Skin: Negative for color change, pallor, rash and wound. Neurological: Negative for dizziness, weakness and light-headedness. All other systems reviewed and are negative. Physical Exam   Physical Exam  Vitals signs and nursing note reviewed. Constitutional:       General: She is not in acute distress. Appearance: She is well-developed. Comments: Patient in NAD   HENT:      Head: Normocephalic and atraumatic. Eyes:      Conjunctiva/sclera: Conjunctivae normal.   Cardiovascular:      Rate and Rhythm: Normal rate and regular rhythm. Heart sounds: Normal heart sounds. Pulmonary:      Effort: Pulmonary effort is normal. No respiratory distress. Breath sounds: Normal breath sounds. Abdominal:      General: Bowel sounds are normal. There is no distension. Palpations: Abdomen is soft. Tenderness: There is abdominal tenderness in the periumbilical area, suprapubic area and left lower quadrant. Comments: Abdomen soft, mildly distended  No fluid wave  No guarding or rigidity  No overlying skin changes appreciated   Musculoskeletal: Normal range of motion. Skin:     General: Skin is warm.       Findings: No rash.   Neurological:      Mental Status: She is alert and oriented to person, place, and time. Psychiatric:         Behavior: Behavior normal.         Diagnostic Study Results     Labs -     Recent Results (from the past 12 hour(s))   URINALYSIS W/ RFLX MICROSCOPIC    Collection Time: 12/10/20  7:15 PM   Result Value Ref Range    Color YELLOW      Appearance CLEAR      Specific gravity 1.014 1.005 - 1.030      pH (UA) 5.5 5.0 - 8.0      Protein Negative NEG mg/dL    Glucose >1,000 (A) NEG mg/dL    Ketone Negative NEG mg/dL    Bilirubin Negative NEG      Blood Negative NEG      Urobilinogen 0.2 0.2 - 1.0 EU/dL    Nitrites Negative NEG      Leukocyte Esterase Negative NEG     CBC WITH AUTOMATED DIFF    Collection Time: 12/10/20  8:12 PM   Result Value Ref Range    WBC 6.0 4.6 - 13.2 K/uL    RBC 5.02 4.20 - 5.30 M/uL    HGB 13.3 12.0 - 16.0 g/dL    HCT 43.4 35.0 - 45.0 %    MCV 86.5 74.0 - 97.0 FL    MCH 26.5 24.0 - 34.0 PG    MCHC 30.6 (L) 31.0 - 37.0 g/dL    RDW 14.5 11.6 - 14.5 %    PLATELET 642 255 - 437 K/uL    MPV 10.8 9.2 - 11.8 FL    NEUTROPHILS 53 40 - 73 %    LYMPHOCYTES 35 21 - 52 %    MONOCYTES 9 3 - 10 %    EOSINOPHILS 3 0 - 5 %    BASOPHILS 0 0 - 2 %    ABS. NEUTROPHILS 3.1 1.8 - 8.0 K/UL    ABS. LYMPHOCYTES 2.1 0.9 - 3.6 K/UL    ABS. MONOCYTES 0.6 0.05 - 1.2 K/UL    ABS. EOSINOPHILS 0.2 0.0 - 0.4 K/UL    ABS.  BASOPHILS 0.0 0.0 - 0.1 K/UL    DF AUTOMATED     METABOLIC PANEL, COMPREHENSIVE    Collection Time: 12/10/20  8:12 PM   Result Value Ref Range    Sodium 138 136 - 145 mmol/L    Potassium 4.2 3.5 - 5.5 mmol/L    Chloride 104 100 - 111 mmol/L    CO2 31 21 - 32 mmol/L    Anion gap 3 3.0 - 18 mmol/L    Glucose 150 (H) 74 - 99 mg/dL    BUN 17 7.0 - 18 MG/DL    Creatinine 1.25 0.6 - 1.3 MG/DL    BUN/Creatinine ratio 14 12 - 20      GFR est AA 53 (L) >60 ml/min/1.73m2    GFR est non-AA 44 (L) >60 ml/min/1.73m2    Calcium 9.4 8.5 - 10.1 MG/DL    Bilirubin, total 0.2 0.2 - 1.0 MG/DL    ALT (SGPT) 22 13 - 56 U/L    AST (SGOT) 11 10 - 38 U/L    Alk. phosphatase 139 (H) 45 - 117 U/L    Protein, total 8.1 6.4 - 8.2 g/dL    Albumin 3.4 3.4 - 5.0 g/dL    Globulin 4.7 (H) 2.0 - 4.0 g/dL    A-G Ratio 0.7 (L) 0.8 - 1.7     LIPASE    Collection Time: 12/10/20  8:12 PM   Result Value Ref Range    Lipase 199 73 - 393 U/L   MAGNESIUM    Collection Time: 12/10/20  8:12 PM   Result Value Ref Range    Magnesium 2.0 1.6 - 2.6 mg/dL   TROPONIN I    Collection Time: 12/10/20  8:12 PM   Result Value Ref Range    Troponin-I, QT <0.02 0.0 - 0.045 NG/ML   NT-PRO BNP    Collection Time: 12/10/20  8:12 PM   Result Value Ref Range    NT pro-BNP 58 0 - 900 PG/ML   EKG, 12 LEAD, INITIAL    Collection Time: 12/10/20  8:19 PM   Result Value Ref Range    Ventricular Rate 95 BPM    Atrial Rate 95 BPM    P-R Interval 130 ms    QRS Duration 82 ms    Q-T Interval 342 ms    QTC Calculation (Bezet) 429 ms    Calculated P Axis 64 degrees    Calculated R Axis 57 degrees    Calculated T Axis 88 degrees    Diagnosis       Normal sinus rhythm  Possible Left atrial enlargement  Nonspecific T wave abnormality  Abnormal ECG  When compared with ECG of 03-DEC-2020 16:55,  No significant change was found         Radiologic Studies -   XR CHEST PORT    (Results Pending)   CT ABD PELV W CONT    (Results Pending)     CT Results  (Last 48 hours)    None        CXR Results  (Last 48 hours)    None          Medical Decision Making   I am the first provider for this patient. I reviewed the vital signs, available nursing notes, past medical history, past surgical history, family history and social history. Vital Signs-Reviewed the patient's vital signs.   Patient Vitals for the past 12 hrs:   Temp Pulse Resp BP SpO2   12/10/20 1911 98.5 °F (36.9 °C) (!) 109 16 (!) 141/87 100 %       Records Reviewed: Nursing Notes and Old Medical Records    Provider Notes (Medical Decision Making):   DDx: SBO, Ileus, Tissue adhesions, Ovarian Cyst, UTI    60 yo F who presents with lower abdominal pain and bloating x  2 days. Denies vomiting, diarrhea. On exam abdomen mildly distended with tenderness to suprapubic and LLQ. No leukocytosis or left shift. UA shows no WBC in urine. CT scan shows no SBO or ileus with left ovarian cyst.  This is potentially reason for pt's of pain. Discussed with patient need for nonemergent outpatient f/u with gyn. Patient treated symptomatically. Patient nontoxic-appearing in NAD. Patient able prompt outpatient follow-up with PCP 1-2 days. Patient given strict instructions to return if symptoms worsen. ED Course:   Initial assessment performed. The patients presenting problems have been discussed, and they are in agreement with the care plan formulated and outlined with them. I have encouraged them to ask questions as they arise throughout their visit. Preliminary CT shows postsurgical changes within the bowel. No bowel wall thickening or obstruction. 3.9 cm cyst within the left adnexa which may be ovarian origin. Disposition:  11:35 PM  Discussed lab and imaging results with pt along with dx and treatment plan. Discussed importance of PCP follow up. All questions answered. Pt voiced they understood. Return if sx worsen. PLAN:  1. Current Discharge Medication List        2. Follow-up Information    None       Return to ED if worse     Diagnosis     Clinical Impression:   1. Generalized abdominal pain    2. Abdominal bloating        Attestations:    GUILLERMO Romano    Please note that this dictation was completed with [employer], the computer voice recognition software. Quite often unanticipated grammatical, syntax, homophones, and other interpretive errors are inadvertently transcribed by the computer software. Please disregard these errors. Please excuse any errors that have escaped final proofreading. Thank you.

## 2020-12-11 NOTE — DISCHARGE INSTRUCTIONS
Patient Education        Functional Ovarian Cyst: Care Instructions  Your Care Instructions     A functional ovarian cyst is a sac that forms on the surface of a woman's ovary during ovulation. The sac holds a maturing egg. Usually the sac goes away after the egg is released. But if the egg is not released, or if the sac closes up after the egg is released, the sac can swell up with fluid and form a cyst.  Functional ovarian cysts are different than ovarian growths caused by other problems, such as cancer. Most functional ovarian cysts cause no symptoms and go away on their own. Some cause mild pain. Others can cause severe pain when they rupture or bleed. Follow-up care is a key part of your treatment and safety. Be sure to make and go to all appointments, and call your doctor if you are having problems. It's also a good idea to know your test results and keep a list of the medicines you take. How can you care for yourself at home? · Use heat, such as a hot water bottle, a heating pad set on low, or a warm bath, to relax tense muscles and relieve cramping. · Be safe with medicines. Take pain medicines exactly as directed. ? If the doctor gave you a prescription medicine for pain, take it as prescribed. ? If you are not taking a prescription pain medicine, ask your doctor if you can take an over-the-counter medicine. · Avoid constipation. Make sure you drink enough fluids and include fruits, vegetables, and fiber in your diet each day. Constipation does not cause ovarian cysts, but it may make your pelvic pain worse. When should you call for help? Call your doctor now or seek immediate medical care if:    · You have severe vaginal bleeding.     · You have new or worse belly or pelvic pain. Watch closely for changes in your health, and be sure to contact your doctor if:    · You have unusual vaginal bleeding.     · You do not get better as expected. Where can you learn more?   Go to http://betzy-didi.info/  Enter O234 in the search box to learn more about \"Functional Ovarian Cyst: Care Instructions. \"  Current as of: November 8, 2019               Content Version: 12.6  © 5747-6415 [employer]. Care instructions adapted under license by [employer] (which disclaims liability or warranty for this information). If you have questions about a medical condition or this instruction, always ask your healthcare professional. [place] [address] any warranty or liability for your use of this information. Patient Education        Gas and Bloating: Care Instructions  Your Care Instructions     Gas and bloating can be uncomfortable and embarrassing problems. All people pass gas, but some people produce more gas than others, sometimes enough to cause distress. It is normal to pass gas from 6 to 20 times per day. Excess gas usually is not caused by a serious health problem. Gas and bloating usually are caused by something you eat or drink, including some food supplements and medicines. Gas and bloating are usually harmless and go away without treatment. However, changing your diet can help end the problem. Some over-the-counter medicines can help prevent gas and relieve bloating. Follow-up care is a key part of your treatment and safety. Be sure to make and go to all appointments, and call your doctor if you are having problems. It's also a good idea to know your test results and keep a list of the medicines you take. How can you care for yourself at home? · Keep a food diary if you think a food gives you gas. Write down what you eat or drink. Also record when you get gas. If you notice that a food seems to cause your gas each time, avoid it and see if the gas goes away. Examples of foods that cause gas include:  ? Fried and fatty foods. ? Beans.   ? Vegetables such as artichokes, asparagus, broccoli, brussels sprouts, cabbage, cauliflower, cucumbers, green peppers, onions, peas, radishes, and raw potatoes. ? Fruits such as apricots, bananas, melons, peaches, pears, prunes, and raw apples. ? Wheat and wheat bran. · Soak dry beans in water overnight, then dump the water and cook the soaked beans in new water. This can help prevent gas and bloating. · If you have problems with lactose, avoid dairy products such as milk and cheese. · Try not to swallow air. Do not drink through a straw, gulp your food, or chew gum. · Take an over-the-counter medicine. Read and follow all instructions on the label. ? Food enzymes, such as Beano, can be added to gas-producing foods to prevent gas. ? Antacids, such as Maalox Anti-Gas and Mylanta Gas, can relieve bloating by making you burp. Be careful when you take over-the-counter antacid medicines. Many of these medicines have aspirin in them. Read the label to make sure that you are not taking more than the recommended dose. Too much aspirin can be harmful. ? Activated charcoal tablets, such as CharcoCaps, may decrease odor from gas you pass. ? If you have problems with lactose, you can take medicines such as Dairy Ease and Lactaid with dairy products to prevent gas and bloating. · Get some exercise regularly. When should you call for help? Call 911 anytime you think you may need emergency care. For example, call if:    · You have gas and signs of a heart attack, such as:  ? Chest pain or pressure. ? Sweating. ? Shortness of breath. ? Nausea or vomiting. ? Pain that spreads from the chest to the neck, jaw, or one or both shoulders or arms. ? Dizziness or lightheadedness. ? A fast or uneven pulse. After calling 911, chew 1 adult-strength aspirin. Wait for an ambulance. Do not try to drive yourself. Call your doctor now or seek immediate medical care if:    · You have severe belly pain.     · You have blood in your stool.    Watch closely for changes in your health, and be sure to contact your doctor if:    · You have blood or pus in your urine.     · Your urine is cloudy or smells bad.     · You are burping and have trouble swallowing.     · You feel bloated and have swelling in your belly.     · You do not get better as expected. Where can you learn more? Go to http://www.gray.com/  Enter [id number] in the search box to learn more about \"Gas and Bloating: Care Instructions. \"  Current as of: June 26, 2019               Content Version: 12.6  © 3819-2420 [employer]. Care instructions adapted under license by [employer] (which disclaims liability or warranty for this information). If you have questions about a medical condition or this instruction, always ask your healthcare professional. [address] any warranty or liability for your use of this information.

## 2020-12-29 ENCOUNTER — HOSPITAL ENCOUNTER (EMERGENCY)
Age: 60
Discharge: HOME | End: 2020-12-29
Attending: EMERGENCY MEDICINE
Payer: COMMERCIAL

## 2020-12-29 ENCOUNTER — APPOINTMENT (OUTPATIENT)
Dept: CT IMAGING | Age: 60
End: 2020-12-29
Attending: EMERGENCY MEDICINE
Payer: COMMERCIAL

## 2020-12-29 VITALS
DIASTOLIC BLOOD PRESSURE: 73 MMHG | HEART RATE: 83 BPM | HEIGHT: 63 IN | TEMPERATURE: 99.2 F | SYSTOLIC BLOOD PRESSURE: 122 MMHG | WEIGHT: 150 LBS | RESPIRATION RATE: 20 BRPM | OXYGEN SATURATION: 100 % | BODY MASS INDEX: 26.58 KG/M2

## 2020-12-29 DIAGNOSIS — K59.00 CONSTIPATION, UNSPECIFIED CONSTIPATION TYPE: ICD-10-CM

## 2020-12-29 DIAGNOSIS — R10.31 ABDOMINAL PAIN, RIGHT LOWER QUADRANT: Primary | ICD-10-CM

## 2020-12-29 DIAGNOSIS — L03.311 ABDOMINAL WALL CELLULITIS: ICD-10-CM

## 2020-12-29 LAB
ALBUMIN SERPL-MCNC: 3.4 G/DL (ref 3.4–5)
ALBUMIN/GLOB SERPL: 0.7 {RATIO} (ref 0.8–1.7)
ALP SERPL-CCNC: 136 U/L (ref 45–117)
ALT SERPL-CCNC: 25 U/L (ref 13–56)
ANION GAP SERPL CALC-SCNC: 6 MMOL/L (ref 3–18)
APPEARANCE UR: CLEAR
AST SERPL-CCNC: 20 U/L (ref 10–38)
BACTERIA URNS QL MICRO: ABNORMAL /HPF
BASOPHILS # BLD: 0 K/UL (ref 0–0.1)
BASOPHILS NFR BLD: 0 % (ref 0–2)
BILIRUB DIRECT SERPL-MCNC: <0.1 MG/DL (ref 0–0.2)
BILIRUB SERPL-MCNC: 0.2 MG/DL (ref 0.2–1)
BILIRUB UR QL: NEGATIVE
BUN SERPL-MCNC: 15 MG/DL (ref 7–18)
BUN/CREAT SERPL: 15 (ref 12–20)
CALCIUM SERPL-MCNC: 9.3 MG/DL (ref 8.5–10.1)
CHLORIDE SERPL-SCNC: 105 MMOL/L (ref 100–111)
CO2 SERPL-SCNC: 25 MMOL/L (ref 21–32)
COLOR UR: YELLOW
CREAT SERPL-MCNC: 1.03 MG/DL (ref 0.6–1.3)
DIFFERENTIAL METHOD BLD: ABNORMAL
EOSINOPHIL # BLD: 0.2 K/UL (ref 0–0.4)
EOSINOPHIL NFR BLD: 4 % (ref 0–5)
EPITH CASTS URNS QL MICRO: ABNORMAL /LPF (ref 0–5)
ERYTHROCYTE [DISTWIDTH] IN BLOOD BY AUTOMATED COUNT: 14.4 % (ref 11.6–14.5)
GLOBULIN SER CALC-MCNC: 4.7 G/DL (ref 2–4)
GLUCOSE SERPL-MCNC: 131 MG/DL (ref 74–99)
GLUCOSE UR STRIP.AUTO-MCNC: >1000 MG/DL
HCT VFR BLD AUTO: 45.2 % (ref 35–45)
HGB BLD-MCNC: 13.8 G/DL (ref 12–16)
HGB UR QL STRIP: NEGATIVE
KETONES UR QL STRIP.AUTO: NEGATIVE MG/DL
LEUKOCYTE ESTERASE UR QL STRIP.AUTO: ABNORMAL
LIPASE SERPL-CCNC: 210 U/L (ref 73–393)
LYMPHOCYTES # BLD: 1.7 K/UL (ref 0.9–3.6)
LYMPHOCYTES NFR BLD: 35 % (ref 21–52)
MCH RBC QN AUTO: 26 PG (ref 24–34)
MCHC RBC AUTO-ENTMCNC: 30.5 G/DL (ref 31–37)
MCV RBC AUTO: 85.3 FL (ref 74–97)
MONOCYTES # BLD: 0.3 K/UL (ref 0.05–1.2)
MONOCYTES NFR BLD: 7 % (ref 3–10)
NEUTS SEG # BLD: 2.7 K/UL (ref 1.8–8)
NEUTS SEG NFR BLD: 54 % (ref 40–73)
NITRITE UR QL STRIP.AUTO: NEGATIVE
PH UR STRIP: 5 [PH] (ref 5–8)
PLATELET # BLD AUTO: 342 K/UL (ref 135–420)
PMV BLD AUTO: 11.1 FL (ref 9.2–11.8)
POTASSIUM SERPL-SCNC: 3.9 MMOL/L (ref 3.5–5.5)
PROT SERPL-MCNC: 8.1 G/DL (ref 6.4–8.2)
PROT UR STRIP-MCNC: NEGATIVE MG/DL
RBC # BLD AUTO: 5.3 M/UL (ref 4.2–5.3)
RBC #/AREA URNS HPF: NEGATIVE /HPF (ref 0–5)
SODIUM SERPL-SCNC: 136 MMOL/L (ref 136–145)
SP GR UR REFRACTOMETRY: 1.01 (ref 1–1.03)
UROBILINOGEN UR QL STRIP.AUTO: 0.2 EU/DL (ref 0.2–1)
WBC # BLD AUTO: 5 K/UL (ref 4.6–13.2)
WBC URNS QL MICRO: ABNORMAL /HPF (ref 0–4)

## 2020-12-29 PROCEDURE — 81001 URINALYSIS AUTO W/SCOPE: CPT

## 2020-12-29 PROCEDURE — 74011000636 HC RX REV CODE- 636: Performed by: EMERGENCY MEDICINE

## 2020-12-29 PROCEDURE — 74011250637 HC RX REV CODE- 250/637: Performed by: EMERGENCY MEDICINE

## 2020-12-29 PROCEDURE — 99284 EMERGENCY DEPT VISIT MOD MDM: CPT

## 2020-12-29 PROCEDURE — 83690 ASSAY OF LIPASE: CPT

## 2020-12-29 PROCEDURE — 96375 TX/PRO/DX INJ NEW DRUG ADDON: CPT

## 2020-12-29 PROCEDURE — 80076 HEPATIC FUNCTION PANEL: CPT

## 2020-12-29 PROCEDURE — 74011250636 HC RX REV CODE- 250/636: Performed by: EMERGENCY MEDICINE

## 2020-12-29 PROCEDURE — 80048 BASIC METABOLIC PNL TOTAL CA: CPT

## 2020-12-29 PROCEDURE — 96376 TX/PRO/DX INJ SAME DRUG ADON: CPT

## 2020-12-29 PROCEDURE — 85025 COMPLETE CBC W/AUTO DIFF WBC: CPT

## 2020-12-29 PROCEDURE — 96374 THER/PROPH/DIAG INJ IV PUSH: CPT

## 2020-12-29 PROCEDURE — 74177 CT ABD & PELVIS W/CONTRAST: CPT

## 2020-12-29 RX ORDER — MORPHINE SULFATE 4 MG/ML
4 INJECTION, SOLUTION INTRAMUSCULAR; INTRAVENOUS ONCE
Status: COMPLETED | OUTPATIENT
Start: 2020-12-29 | End: 2020-12-29

## 2020-12-29 RX ORDER — POLYETHYLENE GLYCOL 3350 17 G/17G
17 POWDER, FOR SOLUTION ORAL DAILY
Qty: 119 G | Refills: 0 | Status: SHIPPED | OUTPATIENT
Start: 2020-12-29 | End: 2021-01-05

## 2020-12-29 RX ORDER — CEPHALEXIN 500 MG/1
500 CAPSULE ORAL 4 TIMES DAILY
Qty: 40 CAP | Refills: 0 | Status: SHIPPED | OUTPATIENT
Start: 2020-12-29 | End: 2021-01-08

## 2020-12-29 RX ORDER — CEPHALEXIN 250 MG/1
500 CAPSULE ORAL
Status: COMPLETED | OUTPATIENT
Start: 2020-12-29 | End: 2020-12-29

## 2020-12-29 RX ORDER — KETOROLAC TROMETHAMINE 30 MG/ML
30 INJECTION, SOLUTION INTRAMUSCULAR; INTRAVENOUS ONCE
Status: COMPLETED | OUTPATIENT
Start: 2020-12-29 | End: 2020-12-29

## 2020-12-29 RX ADMIN — MORPHINE SULFATE 4 MG: 4 INJECTION, SOLUTION INTRAMUSCULAR; INTRAVENOUS at 15:13

## 2020-12-29 RX ADMIN — KETOROLAC TROMETHAMINE 30 MG: 30 INJECTION, SOLUTION INTRAMUSCULAR at 15:13

## 2020-12-29 RX ADMIN — SODIUM CHLORIDE 1000 ML: 900 INJECTION, SOLUTION INTRAVENOUS at 15:12

## 2020-12-29 RX ADMIN — MORPHINE SULFATE 4 MG: 4 INJECTION, SOLUTION INTRAMUSCULAR; INTRAVENOUS at 17:26

## 2020-12-29 RX ADMIN — IOPAMIDOL 93 ML: 612 INJECTION, SOLUTION INTRAVENOUS at 17:53

## 2020-12-29 RX ADMIN — CEPHALEXIN 500 MG: 250 CAPSULE ORAL at 19:13

## 2020-12-29 NOTE — ED PROVIDER NOTES
EMERGENCY DEPARTMENT HISTORY AND PHYSICAL EXAM    3:06 PM crowding in the ER admission hold sure nurses medications in the waiting room. Date: 2020  Patient Name: Dale Arguello    History of Presenting Illness     Chief Complaint   Patient presents with    Abdominal Pain    Vomiting         History Provided By: patient    Additional History (Context): Dale Arguello is a 61 y.o. female presents with history of multiple prior bowel obstruction. Comes in with 3 days of right lower quadrant abdominal pain which is severe and constant, she is having some vomiting. She is having bowel movements, normal in caliber and slightly smaller amount and is passing some gas most recent bowel movement this morning. No syncope no urinary symptoms. She has had a total hysterectomy. Surgeons Dr. Yen Gale    PCP: Bethel Faustin MD    Chief Complaint:   Duration:    Timing:    Location:   Quality:   Severity:   Modifying Factors:   Associated Symptoms:       Current Outpatient Medications   Medication Sig Dispense Refill    cephALEXin (Keflex) 500 mg capsule Take 1 Cap by mouth four (4) times daily for 10 days. 40 Cap 0    polyethylene glycol (Miralax) 17 gram/dose powder Take 17 g by mouth daily for 7 days. 1 tablespoon with 8 oz of water daily 119 g 0    ondansetron (Zofran ODT) 4 mg disintegrating tablet Take 1 tablets every 6-8 hours as needed for nausea and vomiting. 30 Tab 1    dicyclomine (BENTYL) 20 mg tablet Take 1 Tab by mouth every six (6) hours as needed for Abdominal Cramps. 20 Tab 0    omeprazole (PRILOSEC) 10 mg capsule take 1 capsule by mouth once daily 90 Cap 4    dapagliflozin (FARXIGA) 10 mg tab tablet Take 10 mg by mouth Every morning.       metFORMIN ER (GLUCOPHAGE XR) 500 mg tablet take 2 tablets by mouth twice a day 120 Tab 5    bisoprolol-hydroCHLOROthiazide (ZIAC) 2.5-6.25 mg per tablet take 1 tablet by mouth once daily 90 Tab 3    doxepin (SINEquan) 25 mg capsule Take 1 Cap by mouth nightly. 90 Cap 3    amLODIPine (NORVASC) 5 mg tablet Take 1 Tab by mouth daily. 30 Tab 0    albuterol sulfate (PROAIR RESPICLICK) 90 mcg/actuation aepb Take 2 Puffs by inhalation every four (4) hours as needed for Wheezing, Shortness of Breath or Cough. 1 Inhaler 0    albuterol (PROVENTIL VENTOLIN) 2.5 mg /3 mL (0.083 %) nebu 3 mL by Nebulization route every six (6) hours as needed for Wheezing. 30 Nebule 1    multivitamin (ONE A DAY) tablet Take 1 Tab by mouth daily.       glucose blood VI test strips (ONETOUCH ULTRA TEST) strip Check blood glucose as directed 100 Strip 1       Past History     Past Medical History:  Past Medical History:   Diagnosis Date    Abdominal adhesions     Adnexal cyst 7/30/2019    Left    Anemia     Asthma     Chronic pelvic pain in female 7/30/2019    Diabetes mellitus     Dyskinesia     bilateral    Essential hypertension     GERD (gastroesophageal reflux disease)     Hypertension     Menopause     Microhematuria     Rhe[facility]oid arteritis ([facility] [address] 2018    Stool color black        Past Surgical History:  Past Surgical History:   Procedure Laterality Date    COLONOSCOPY N/A 1/21/2019    COLON[facility]COPY performed [facility] at [facility] ENDOSCOPY    HX CHOLECYSTECTOMY  6/28/10    HX COLONOSCOPY      HX ENDOSCOPY      HX HERNIA REPAIR      HX HYSTERECTOMY  1989    Fibroids    HX KNEE REPLACEMENT Left     HX KNEE REPLACEMENT Right 06/2018    HX OOPHORECTOMY  12/2000    HX ORTHOPAEDIC Right     ankle- multiple surgeries    HX SMALL BOWEL RESECTION  12/2000    HX SMALL BOWEL RESECTION  02/21/2017    Dr. Angeles Elias         Family History:  Family History   Problem Relation Age of Onset    Hypertension Other         parent    Breast Cancer Other 21    Heart Disease Father     Diabetes Father     Lung Disease Father     Diabetes Mother     Hypertension Other         sibling    Diabetes Other         parent    Kidney Disease Brother     Diabetes Brother     Lung Disease Brother        Social History:  Social History     Tobacco Use    Smoking status: Never Smoker    Smokeless tobacco: Never Used   Substance Use Topics    Alcohol use: No    Drug use: No       Allergies: Allergies   Allergen Reactions    Macrodantin [Nitrofurantoin Macrocrystalline] Itching and Other (comments)    Tape [Adhesive] Other (comments)     Paper tape-- feels like burning skin  Burned skin when had wound vac         Review of Systems     Review of Systems   Constitutional: Negative for diaphoresis and fever. HENT: Negative for congestion and sore throat. Eyes: Negative for pain and itching. Respiratory: Negative for cough and shortness of breath. Cardiovascular: Negative for chest pain and palpitations. Gastrointestinal: Positive for abdominal pain. Negative for diarrhea. Endocrine: Negative for polydipsia and polyuria. Genitourinary: Negative for dysuria and hematuria. Musculoskeletal: Negative for arthralgias and myalgias. Skin: Negative for rash and wound. Neurological: Negative for seizures and syncope. Hematological: Does not bruise/bleed easily. Psychiatric/Behavioral: Negative for agitation and hallucinations. Physical Exam       Patient Vitals for the past 12 hrs:   Temp Pulse Resp BP SpO2   12/29/20 1714     100 %   12/29/20 1713     100 %   12/29/20 1712     100 %   12/29/20 1711     100 %   12/29/20 1710     97 %   12/29/20 1709    122/73    12/29/20 1645    134/88 100 %   12/29/20 1616     100 %   12/29/20 1615    (!) 138/90 98 %   12/29/20 1601     100 %   12/29/20 1600    (!) 135/90 99 %   12/29/20 1545    (!) 148/84 100 %   12/29/20 1403 99.2 °F (37.3 °C) 83 20 (!) 147/101 100 %       Physical Exam  Vitals signs and nursing note reviewed. Constitutional:       General: She is in acute distress (Moderate pain). Appearance: She is well-developed. She is obese. HENT:      Head: Normocephalic and atraumatic. Eyes:      General: No scleral icterus. Conjunctiva/sclera: Conjunctivae normal.   Neck:      Musculoskeletal: Normal range of motion and neck supple. Vascular: No JVD. Cardiovascular:      Rate and Rhythm: Normal rate and regular rhythm. Heart sounds: Normal heart sounds. Comments: 4 intact extremity pulses  Pulmonary:      Effort: Pulmonary effort is normal.      Breath sounds: Normal breath sounds. Abdominal:      Palpations: Abdomen is soft. There is no mass. Tenderness: There is abdominal tenderness in the right lower quadrant. Hernia: No hernia is present. Comments: Several well-healed surgical scars on the belly. Musculoskeletal: Normal range of motion. Lymphadenopathy:      Cervical: No cervical adenopathy. Skin:     General: Skin is warm and dry. Neurological:      Mental Status: She is alert.            Diagnostic Study Results   Labs -  Recent Results (from the past 12 hour(s))   METABOLIC PANEL, BASIC    Collection Time: 12/29/20  1:42 PM   Result Value Ref Range    Sodium 136 136 - 145 mmol/L    Potassium 3.9 3.5 - 5.5 mmol/L    Chloride 105 100 - 111 mmol/L    CO2 25 21 - 32 mmol/L    Anion gap 6 3.0 - 18 mmol/L    Glucose 131 (H) 74 - 99 mg/dL    BUN 15 7.0 - 18 MG/DL    Creatinine 1.03 0.6 - 1.3 MG/DL    BUN/Creatinine ratio 15 12 - 20      GFR est AA >60 >60 ml/min/1.73m2    GFR est non-AA 55 (L) >60 ml/min/1.73m2    Calcium 9.3 8.5 - 10.1 MG/DL   CBC WITH AUTOMATED DIFF    Collection Time: 12/29/20  1:42 PM   Result Value Ref Range    WBC 5.0 4.6 - 13.2 K/uL    RBC 5.30 4.20 - 5.30 M/uL    HGB 13.8 12.0 - 16.0 g/dL    HCT 45.2 (H) 35.0 - 45.0 %    MCV 85.3 74.0 - 97.0 FL    MCH 26.0 24.0 - 34.0 PG    MCHC 30.5 (L) 31.0 - 37.0 g/dL    RDW 14.4 11.6 - 14.5 %    PLATELET 778 055 - 196 K/uL    MPV 11.1 9.2 - 11.8 FL    NEUTROPHILS 54 40 - 73 %    LYMPHOCYTES 35 21 - 52 %    MONOCYTES 7 3 - 10 % EOSINOPHILS 4 0 - 5 %    BASOPHILS 0 0 - 2 %    ABS. NEUTROPHILS 2.7 1.8 - 8.0 K/UL    ABS. LYMPHOCYTES 1.7 0.9 - 3.6 K/UL    ABS. MONOCYTES 0.3 0.05 - 1.2 K/UL    ABS. EOSINOPHILS 0.2 0.0 - 0.4 K/UL    ABS. BASOPHILS 0.0 0.0 - 0.1 K/UL    DF AUTOMATED     HEPATIC FUNCTION PANEL    Collection Time: 12/29/20  1:42 PM   Result Value Ref Range    Protein, total 8.1 6.4 - 8.2 g/dL    Albumin 3.4 3.4 - 5.0 g/dL    Globulin 4.7 (H) 2.0 - 4.0 g/dL    A-G Ratio 0.7 (L) 0.8 - 1.7      Bilirubin, total 0.2 0.2 - 1.0 MG/DL    Bilirubin, direct <0.1 0.0 - 0.2 MG/DL    Alk. phosphatase 136 (H) 45 - 117 U/L    AST (SGOT) 20 10 - 38 U/L    ALT (SGPT) 25 13 - 56 U/L   LIPASE    Collection Time: 12/29/20  1:42 PM   Result Value Ref Range    Lipase 210 73 - 393 U/L   URINALYSIS W/ RFLX MICROSCOPIC    Collection Time: 12/29/20  2:10 PM   Result Value Ref Range    Color YELLOW      Appearance CLEAR      Specific gravity 1.011 1.005 - 1.030      pH (UA) 5.0 5.0 - 8.0      Protein Negative NEG mg/dL    Glucose >1,000 (A) NEG mg/dL    Ketone Negative NEG mg/dL    Bilirubin Negative NEG      Blood Negative NEG      Urobilinogen 0.2 0.2 - 1.0 EU/dL    Nitrites Negative NEG      Leukocyte Esterase TRACE (A) NEG     URINE MICROSCOPIC ONLY    Collection Time: 12/29/20  2:10 PM   Result Value Ref Range    WBC 0 to 3 0 - 4 /hpf    RBC Negative 0 - 5 /hpf    Epithelial cells 1+ 0 - 5 /lpf    Bacteria FEW (A) NEG /hpf       Radiologic Studies -   CT ABD PELV W CONT    (Results Pending)     No results found.     Medications ordered:   Medications   ketorolac (TORADOL) injection 30 mg (30 mg IntraVENous Given 12/29/20 4253)   morphine injection 4 mg (4 mg IntraVENous Given 12/29/20 1513)   sodium chloride 0.9 % bolus infusion 1,000 mL (0 mL IntraVENous IV Completed 12/29/20 1542)   morphine injection 4 mg (4 mg IntraVENous Given 12/29/20 1726)   iopamidoL (ISOVUE 300) 61 % contrast injection 100 mL (93 mL IntraVENous Given 12/29/20 5910) cephALEXin (KEFLEX) capsule 500 mg (500 mg Oral Given 12/29/20 1913)         Medical Decision Making   Initial Medical Decision Making and DDx:     Reviewed her records and she has had 12 CT scans of her belly this year! Clearly has ongoing exacerbations of her belly pain. Will get basic labs, monitor her some pain medications reassess. I really like to avoid giving her 13th CT scan this year. Do not particularly suspect appendicitis diverticulitis bowel perforation pyelonephritis or nephrolithiasis. Most concerning for bowel obstruction    ED Course: Progress Notes, Reevaluation, and Consults:  ED Course as of Dec 29 1913   Tue Dec 29, 2020   1720 Patient was up and ambulatory to the bathroom unassisted. Was hunched over with some discomfort. She peed no bowel movement or passing gas. I reassessed her. She feels her belly is becoming more distended and pain is increasing. We will proceed with CT scan, unable to void at this point.    [CB]      ED Course User Index  [CB] Trudi Key MD     7:10 PM discussed with Dr. Martell Conception radiology, over the telephone, no bowel obstruction, question of increasing stranding in the abdominal wall over the right lower quadrant near the midline. This is increased compared to her CT scan to weeks ago. Son reexamined the patient she says she is feeling much better. Abdomen still protuberant and soft she tolerates palpation better than initially,  in the right lower quadrant. In this context I think stranding may represent a localized peritonitis the source is unclear. She does not appear toxic and she is controlled with relatively low doses of pain medication. Also no fever or leukocytosis and reassuring CT scan. We will put her on Keflex rapid follow-up with her surgeon. Patient is very happy with this plan. Also noted on CT scan very large amount of stool particularly in the ascending colon, recommend 7 days of MiraLAX.     I am the first provider for this patient. I reviewed the vital signs, available nursing notes, past medical history, past surgical history, family history and social history. Patient Vitals for the past 12 hrs:   Temp Pulse Resp BP SpO2   20 1714     100 %   20 1713     100 %   20 1712     100 %   20 1711     100 %   20 1710     97 %   20 1709    122/73    20 1645    134/88 100 %   20 1616     100 %   20 1615    (!) 138/90 98 %   20 1601     100 %   20 1600    (!) 135/90 99 %   20 1545    (!) 148/84 100 %   20 1403 99.2 °F (37.3 °C) 83 20 (!) 147/101 100 %       Vital Signs-Reviewed the patient's vital signs. Pulse Oximetry Analysis, Cardiac Monitor, 12 lead ek 100% on room air no hypoxia on room air  Interpreted by the EP. Records Reviewed: Nursing notes reviewed (Time of Review: 3:06 PM)    Procedures:   Critical Care Time:   Aspirin: (was aspirin given for stroke?)    Diagnosis     Clinical Impression:   1. Abdominal pain, right lower quadrant    2. Constipation, unspecified constipation type    3. Abdominal wall cellulitis        Disposition: Discharged      Follow-up Information     Follow up With Specialties Details Why Contact Info    Jocelin Bird MD Surgery Call in 1 day  [id number] [place] [id number] [phone]             Patient's Medications   Start Taking    CEPHALEXIN (KEFLEX) 500 MG CAPSULE    Take 1 Cap by mouth four (4) times daily for 10 days. POLYETHYLENE GLYCOL (MIRALAX) 17 GRAM/DOSE POWDER    Take 17 g by mouth daily for 7 days. 1 tablespoon with 8 oz of water daily   Continue Taking    ALBUTEROL (PROVENTIL VENTOLIN) 2.5 MG /3 ML (0.083 %) NEBU    3 mL by Nebulization route every six (6) hours as needed for Wheezing.     ALBUTEROL SULFATE (PROAIR RESPICLICK) 90 MCG/ACTUATION AEPB    Take 2 Puffs by inhalation every four (4) hours as needed for Wheezing, Shortness of Breath or Cough. AMLODIPINE (NORVASC) 5 MG TABLET    Take 1 Tab by mouth daily. BISOPROLOL-HYDROCHLOROTHIAZIDE (ZIAC) 2.5-6.25 MG PER TABLET    take 1 tablet by mouth once daily    DAPAGLIFLOZIN (FARXIGA) 10 MG TAB TABLET    Take 10 mg by mouth Every morning. DICYCLOMINE (BENTYL) 20 MG TABLET    Take 1 Tab by mouth every six (6) hours as needed for Abdominal Cramps. DOXEPIN (SINEQUAN) 25 MG CAPSULE    Take 1 Cap by mouth nightly. GLUCOSE BLOOD VI TEST STRIPS (ONETOUCH ULTRA TEST) STRIP    Check blood glucose as directed    METFORMIN ER (GLUCOPHAGE XR) 500 MG TABLET    take 2 tablets by mouth twice a day    MULTIVITAMIN (ONE A DAY) TABLET    Take 1 Tab by mouth daily. OMEPRAZOLE (PRILOSEC) 10 MG CAPSULE    take 1 capsule by mouth once daily    ONDANSETRON (ZOFRAN ODT) 4 MG DISINTEGRATING TABLET    Take 1 tablets every 6-8 hours as needed for nausea and vomiting.    These Medications have changed    No medications on file   Stop Taking    No medications on file     _______________________________    Notes:    Mary Anaya MD using Dragon dictation      _______________________________

## 2020-12-29 NOTE — ED TRIAGE NOTES
I performed a brief evaluation, including history and physical, of the patient here in triage and I have determined that pt will need further treatment and evaluation from the main side ER physician. I have placed initial orders to help in expediting patients care.      December 29, 2020 at 2:02 PM - Daniele Stevens PA-C

## 2020-12-30 NOTE — ED NOTES
Discharge instructions reviewed with pt by previous RN, pt verbalized understanding. No questions at time of discharge, pt in NAD.

## 2021-01-18 ENCOUNTER — HOSPITAL ENCOUNTER (EMERGENCY)
Age: 61
Discharge: HOME | End: 2021-01-19
Attending: EMERGENCY MEDICINE
Payer: COMMERCIAL

## 2021-01-18 DIAGNOSIS — R10.84 ABDOMINAL PAIN, GENERALIZED: Primary | ICD-10-CM

## 2021-01-18 DIAGNOSIS — R11.2 NON-INTRACTABLE VOMITING WITH NAUSEA, UNSPECIFIED VOMITING TYPE: ICD-10-CM

## 2021-01-18 PROCEDURE — 99284 EMERGENCY DEPT VISIT MOD MDM: CPT

## 2021-01-18 PROCEDURE — 96374 THER/PROPH/DIAG INJ IV PUSH: CPT

## 2021-01-18 PROCEDURE — 96375 TX/PRO/DX INJ NEW DRUG ADDON: CPT

## 2021-01-19 ENCOUNTER — APPOINTMENT (OUTPATIENT)
Dept: GENERAL RADIOLOGY | Age: 61
End: 2021-01-19
Attending: EMERGENCY MEDICINE
Payer: COMMERCIAL

## 2021-01-19 VITALS
HEART RATE: 81 BPM | HEIGHT: 64 IN | OXYGEN SATURATION: 98 % | WEIGHT: 148 LBS | RESPIRATION RATE: 18 BRPM | BODY MASS INDEX: 25.27 KG/M2 | SYSTOLIC BLOOD PRESSURE: 135 MMHG | DIASTOLIC BLOOD PRESSURE: 82 MMHG | TEMPERATURE: 98.8 F

## 2021-01-19 LAB
ALBUMIN SERPL-MCNC: 3.2 G/DL (ref 3.4–5)
ALBUMIN/GLOB SERPL: 0.8 {RATIO} (ref 0.8–1.7)
ALP SERPL-CCNC: 123 U/L (ref 45–117)
ALT SERPL-CCNC: 24 U/L (ref 13–56)
ANION GAP SERPL CALC-SCNC: 5 MMOL/L (ref 3–18)
AST SERPL-CCNC: 12 U/L (ref 10–38)
BASOPHILS # BLD: 0 K/UL (ref 0–0.1)
BASOPHILS NFR BLD: 0 % (ref 0–2)
BILIRUB SERPL-MCNC: 0.3 MG/DL (ref 0.2–1)
BUN SERPL-MCNC: 14 MG/DL (ref 7–18)
BUN/CREAT SERPL: 13 (ref 12–20)
CALCIUM SERPL-MCNC: 9.6 MG/DL (ref 8.5–10.1)
CHLORIDE SERPL-SCNC: 106 MMOL/L (ref 100–111)
CO2 SERPL-SCNC: 30 MMOL/L (ref 21–32)
CREAT SERPL-MCNC: 1.06 MG/DL (ref 0.6–1.3)
DIFFERENTIAL METHOD BLD: ABNORMAL
EOSINOPHIL # BLD: 0.2 K/UL (ref 0–0.4)
EOSINOPHIL NFR BLD: 3 % (ref 0–5)
ERYTHROCYTE [DISTWIDTH] IN BLOOD BY AUTOMATED COUNT: 14.6 % (ref 11.6–14.5)
GLOBULIN SER CALC-MCNC: 4.2 G/DL (ref 2–4)
GLUCOSE SERPL-MCNC: 135 MG/DL (ref 74–99)
HCT VFR BLD AUTO: 39.5 % (ref 35–45)
HGB BLD-MCNC: 12.1 G/DL (ref 12–16)
LACTATE BLD-SCNC: 1.56 MMOL/L (ref 0.4–2)
LIPASE SERPL-CCNC: 202 U/L (ref 73–393)
LYMPHOCYTES # BLD: 2.2 K/UL (ref 0.9–3.6)
LYMPHOCYTES NFR BLD: 34 % (ref 21–52)
MCH RBC QN AUTO: 26 PG (ref 24–34)
MCHC RBC AUTO-ENTMCNC: 30.6 G/DL (ref 31–37)
MCV RBC AUTO: 84.8 FL (ref 74–97)
MONOCYTES # BLD: 0.7 K/UL (ref 0.05–1.2)
MONOCYTES NFR BLD: 11 % (ref 3–10)
NEUTS SEG # BLD: 3.4 K/UL (ref 1.8–8)
NEUTS SEG NFR BLD: 52 % (ref 40–73)
PLATELET # BLD AUTO: 337 K/UL (ref 135–420)
PMV BLD AUTO: 10.5 FL (ref 9.2–11.8)
POTASSIUM SERPL-SCNC: 4 MMOL/L (ref 3.5–5.5)
PROT SERPL-MCNC: 7.4 G/DL (ref 6.4–8.2)
RBC # BLD AUTO: 4.66 M/UL (ref 4.2–5.3)
SODIUM SERPL-SCNC: 141 MMOL/L (ref 136–145)
WBC # BLD AUTO: 6.5 K/UL (ref 4.6–13.2)

## 2021-01-19 PROCEDURE — 74018 RADEX ABDOMEN 1 VIEW: CPT

## 2021-01-19 PROCEDURE — 80053 COMPREHEN METABOLIC PANEL: CPT

## 2021-01-19 PROCEDURE — 83690 ASSAY OF LIPASE: CPT

## 2021-01-19 PROCEDURE — 74011250636 HC RX REV CODE- 250/636: Performed by: EMERGENCY MEDICINE

## 2021-01-19 PROCEDURE — 85025 COMPLETE CBC W/AUTO DIFF WBC: CPT

## 2021-01-19 PROCEDURE — 83605 ASSAY OF LACTIC ACID: CPT

## 2021-01-19 RX ORDER — MORPHINE SULFATE 4 MG/ML
4 INJECTION, SOLUTION INTRAMUSCULAR; INTRAVENOUS
Status: COMPLETED | OUTPATIENT
Start: 2021-01-19 | End: 2021-01-19

## 2021-01-19 RX ORDER — ONDANSETRON 4 MG/1
TABLET, ORALLY DISINTEGRATING ORAL
Qty: 15 TAB | Refills: 0 | Status: SHIPPED | OUTPATIENT
Start: 2021-01-19 | End: 2021-01-31

## 2021-01-19 RX ORDER — ONDANSETRON 2 MG/ML
4 INJECTION INTRAMUSCULAR; INTRAVENOUS
Status: COMPLETED | OUTPATIENT
Start: 2021-01-19 | End: 2021-01-19

## 2021-01-19 RX ADMIN — SODIUM CHLORIDE 1000 ML: 900 INJECTION, SOLUTION INTRAVENOUS at 01:07

## 2021-01-19 RX ADMIN — MORPHINE SULFATE 4 MG: 4 INJECTION, SOLUTION INTRAMUSCULAR; INTRAVENOUS at 01:00

## 2021-01-19 RX ADMIN — ONDANSETRON 4 MG: 2 INJECTION INTRAMUSCULAR; INTRAVENOUS at 01:00

## 2021-01-19 NOTE — ED TRIAGE NOTES
Pt arrives with a history of abdominal sx back in 2019 stating that she is having pain where her old BOBBY drain use to be (RLQ). Pt reports feeling bloating, n/v, normal voiding schedule, last bm 1/16/2020. VS stable in triage.

## 2021-01-19 NOTE — ED PROVIDER NOTES
EMERGENCY DEPARTMENT HISTORY AND PHYSICAL EXAM    12:19 AM      Date: 1/18/2021  Patient Name: Marlene Romero    History of Presenting Illness     Chief Complaint   Patient presents with    Abdominal Pain         History Provided By: Patient    Chief Complaint: abdominal pain, vomiting, constipation  Duration:  Days  Timing:  Worsening  Location: diffuse abdomen  Quality: Aching, Pressure and Tightness  Severity: Moderate  Modifying Factors: none  Associated Symptoms: denies any other associated signs or symptoms      Additional History (Context): Marlene Romero is a 61 y.o. female with diabetes and hypertension who presents with abdominal distention and vomiting. Ongoing abdominal distention x2 days vomiting since today. Also reporting no bowel movement for last 2 days. Reports she feels quite bloated. Reports vomiting up some brown material.  Has had history of multiple prior SBO's. Reports this feels similar. No urinary symptoms. No fever. Has not taken anything for this. No other complaints. PCP: Coleta Lennox, MD    Current Facility-Administered Medications   Medication Dose Route Frequency Provider Last Rate Last Admin    sodium chloride 0.9 % bolus infusion 1,000 mL  1,000 mL IntraVENous ONCE Harika Constant M, DO 1,000 mL/hr at 01/19/21 0107 1,000 mL at 01/19/21 0107     Current Outpatient Medications   Medication Sig Dispense Refill    ondansetron (Zofran ODT) 4 mg disintegrating tablet Take 1 tablets every 6-8 hours as needed for nausea and vomiting. 30 Tab 1    dicyclomine (BENTYL) 20 mg tablet Take 1 Tab by mouth every six (6) hours as needed for Abdominal Cramps. 20 Tab 0    omeprazole (PRILOSEC) 10 mg capsule take 1 capsule by mouth once daily 90 Cap 4    dapagliflozin (FARXIGA) 10 mg tab tablet Take 10 mg by mouth Every morning.       metFORMIN ER (GLUCOPHAGE XR) 500 mg tablet take 2 tablets by mouth twice a day 120 Tab 5    bisoprolol-hydroCHLOROthiazide [facility]) 2. 5-6.25 mg per tablet take 1 tablet by mouth once daily 90 Tab 3    doxepin (SINEquan) 25 mg capsule Take 1 Cap by mouth nightly. 90 Cap 3    amLODIPine (NORVASC) 5 mg tablet Take 1 Tab by mouth daily. 30 Tab 0    albuterol sulfate (PROAIR RESPICLICK) 90 mcg/actuation aepb Take 2 Puffs by inhalation every four (4) hours as needed for Wheezing, Shortness of Breath or Cough. 1 Inhaler 0    albuterol (PROVENTIL VENTOLIN) 2.5 mg /3 mL (0.083 %) nebu 3 mL by Nebulization route every six (6) hours as needed for Wheezing. 30 Nebule 1    multivitamin (ONE A DAY) tablet Take 1 Tab by mouth daily.       glucose blood VI test strips (ONETOUCH ULTRA TEST) strip Check blood glucose as directed 100 Strip 1       Past History     Past Medical History:  Past Medical History:   Diagnosis Date    Abdominal adhesions     Adnexal cyst 7/30/2019    Left    Anemia     Asthma     Chronic pelvic pain in female 7/30/2019    Diabetes mellitus     Dyskinesia     bilateral    Essential hypertension     GERD (gastroesophageal reflux disease)     Hypertension     Menopause     Microhematuria     Rheumatoid arteritis ([facility] [address] 2018    Stool color black        Past Surgical History:  Past Surgical History:   Procedure Laterality Date    COLONOSCOPY N/A 1/21/2019    COLONOSCOPY performed by Margarita Garvin [facility] at [facility] ENDOSCOPY    HX CHOLECYSTECTOMY  6/28/10    HX COLONOSCOPY      HX ENDOSCOPY      HX HERNIA REPAIR      HX HYSTERECTOMY  1989    Fibroids    HX KNEE REPLACEMENT Left     HX KNEE REPLACEMENT Right 06/2018    HX OOPHORECTOMY  12/2000    HX ORTHOPAEDIC Right     ankle- multiple surgeries    HX SMALL BOWEL RESECTION  12/2000    HX SMALL BOWEL RESECTION  02/21/2017    Dr. Anni Trevizo         Family History:  Family History   Problem Relation Age of Onset    Hypertension Other         parent    Breast Cancer Other 21    Heart Disease Father     Diabetes Father     Lung Disease Father     Diabetes Mother     Hypertension Other         sibling    Diabetes Other         parent    Kidney Disease Brother     Diabetes Brother     Lung Disease Brother        Social History:  Social History     Tobacco Use    Smoking status: Never Smoker    Smokeless tobacco: Never Used   Substance Use Topics    Alcohol use: No    Drug use: No       Allergies: Allergies   Allergen Reactions    Macrodantin [Nitrofurantoin Macrocrystalline] Itching and Other (comments)    Tape [Adhesive] Other (comments)     Paper tape-- feels like burning skin  Burned skin when had wound vac         Review of Systems       Review of Systems   Constitutional: Negative for chills and fever. Respiratory: Negative for shortness of breath. Cardiovascular: Negative for chest pain. Gastrointestinal: Positive for abdominal distention, abdominal pain, constipation, nausea and vomiting. Genitourinary: Negative for dysuria and hematuria. All other systems reviewed and are negative. Physical Exam     Visit Vitals  BP (!) 130/91 (BP 1 Location: Right arm, BP Patient Position: Sitting)   Pulse 81   Temp 98.8 °F (37.1 °C)   Resp 18   Ht 5' 4\" (1.626 m)   Wt 67.1 kg (148 lb)   SpO2 100%   BMI 25.40 kg/m²         Physical Exam  Constitutional:       Appearance: She is well-developed. HENT:      Head: Normocephalic and atraumatic. Neck:      Musculoskeletal: Neck supple. Vascular: No JVD. Cardiovascular:      Rate and Rhythm: Normal rate and regular rhythm. Pulmonary:      Effort: Pulmonary effort is normal. No respiratory distress. Breath sounds: Normal breath sounds. Abdominal:      General: Abdomen is protuberant. Bowel sounds are increased. There is distension. Palpations: Abdomen is soft. Tenderness: There is generalized abdominal tenderness. There is no guarding or rebound. Musculoskeletal:      Comments: No joint tenderness   Skin:     General: Skin is warm and dry. Findings: No erythema. Neurological:      Mental Status: She is alert and oriented to person, place, and time. Psychiatric:         Judgment: Judgment normal.           Diagnostic Study Results     Labs -  Recent Results (from the past 12 hour(s))   CBC WITH AUTOMATED DIFF    Collection Time: 01/19/21 12:48 AM   Result Value Ref Range    WBC 6.5 4.6 - 13.2 K/uL    RBC 4.66 4.20 - 5.30 M/uL    HGB 12.1 12.0 - 16.0 g/dL    HCT 39.5 35.0 - 45.0 %    MCV 84.8 74.0 - 97.0 FL    MCH 26.0 24.0 - 34.0 PG    MCHC 30.6 (L) 31.0 - 37.0 g/dL    RDW 14.6 (H) 11.6 - 14.5 %    PLATELET 142 235 - 440 K/uL    MPV 10.5 9.2 - 11.8 FL    NEUTROPHILS 52 40 - 73 %    LYMPHOCYTES 34 21 - 52 %    MONOCYTES 11 (H) 3 - 10 %    EOSINOPHILS 3 0 - 5 %    BASOPHILS 0 0 - 2 %    ABS. NEUTROPHILS 3.4 1.8 - 8.0 K/UL    ABS. LYMPHOCYTES 2.2 0.9 - 3.6 K/UL    ABS. MONOCYTES 0.7 0.05 - 1.2 K/UL    ABS. EOSINOPHILS 0.2 0.0 - 0.4 K/UL    ABS. BASOPHILS 0.0 0.0 - 0.1 K/UL    DF AUTOMATED     METABOLIC PANEL, COMPREHENSIVE    Collection Time: 01/19/21 12:48 AM   Result Value Ref Range    Sodium 141 136 - 145 mmol/L    Potassium 4.0 3.5 - 5.5 mmol/L    Chloride 106 100 - 111 mmol/L    CO2 30 21 - 32 mmol/L    Anion gap 5 3.0 - 18 mmol/L    Glucose 135 (H) 74 - 99 mg/dL    BUN 14 7.0 - 18 MG/DL    Creatinine 1.06 0.6 - 1.3 MG/DL    BUN/Creatinine ratio 13 12 - 20      GFR est AA >60 >60 ml/min/1.73m2    GFR est non-AA 53 (L) >60 ml/min/1.73m2    Calcium 9.6 8.5 - 10.1 MG/DL    Bilirubin, total 0.3 0.2 - 1.0 MG/DL    ALT (SGPT) 24 13 - 56 U/L    AST (SGOT) 12 10 - 38 U/L    Alk.  phosphatase 123 (H) 45 - 117 U/L    Protein, total 7.4 6.4 - 8.2 g/dL    Albumin 3.2 (L) 3.4 - 5.0 g/dL    Globulin 4.2 (H) 2.0 - 4.0 g/dL    A-G Ratio 0.8 0.8 - 1.7     LIPASE    Collection Time: 01/19/21 12:48 AM   Result Value Ref Range    Lipase 202 73 - 393 U/L   POC LACTIC ACID    Collection Time: 01/19/21  1:13 AM   Result Value Ref Range    Lactic Acid (POC) 1.56 0.40 - 2.00 mmol/L       Radiologic Studies -   XR ABD (KUB)    (Results Pending)     Nonobstructive KUB    Medical Decision Making   I am the first provider for this patient. I reviewed the vital signs, available nursing notes, past medical history, past surgical history, family history and social history. Vital Signs-Reviewed the patient's vital signs. Pulse Oximetry Analysis -  100 on room air (Interpretation)nl       Records Reviewed: Nursing Notes and Old Medical Records (Time of Review: 12:19 AM)    ED Course: Progress Notes, Reevaluation, and Consults:      Provider Notes (Medical Decision Making):   61-year-old female presenting with abdominal pain distention and vomiting. Has had multiple visits for similar, most recent CT imaging about 5 days ago. Will start with labs and a KUB. History concerning for possible obstruction but has had multiple visits with similar and multiple CTs which have been unremarkable. Will check basic labs, rule out pancreatitis, hepatitis, also check lactate to eval for ischemia. I discussed results with patient. Labs and imaging unremarkable. She is stable for discharge home. Have advised GI PCP follow-up. Discussed return precautions. Of note no vomiting while in the ED. Diagnosis     Clinical Impression:   1. Abdominal pain, generalized    2.  Non-intractable vomiting with nausea, unspecified vomiting type        Disposition: discharged    Follow-up Information     Follow up With Specialties Details Why Contact Info    Cori Vazquez MD Family Medicine, Internal Medicine Schedule an appointment as soon as possible for a visit in 1 week  [facility] Snow MD Gastroenterology, Internal Medicine Schedule an appointment as soon as possible for a visit in 1 week  [facility] [address]  [address] [address] DEPT Emergency Medicine  As needed, If symptoms worsen [address]           Patient's Medications   Start Taking    No medications on file   Continue Taking    ALBUTEROL (PROVENTIL VENTOLIN) 2.5 MG /3 ML (0.083 %) NEBU    3 mL by Nebulization route every six (6) hours as needed for Wheezing. ALBUTEROL SULFATE (PROAIR RESPICLICK) 90 MCG/ACTUATION AEPB    Take 2 Puffs by inhalation every four (4) hours as needed for Wheezing, Shortness of Breath or Cough. AMLODIPINE (NORVASC) 5 MG TABLET    Take 1 Tab by mouth daily. BISOPROLOL-HYDROCHLOROTHIAZIDE (ZIAC) 2.5-6.25 MG PER TABLET    take 1 tablet by mouth once daily    DAPAGLIFLOZIN (FARXIGA) 10 MG TAB TABLET    Take 10 mg by mouth Every morning. DICYCLOMINE (BENTYL) 20 MG TABLET    Take 1 Tab by mouth every six (6) hours as needed for Abdominal Cramps. DOXEPIN (SINEQUAN) 25 MG CAPSULE    Take 1 Cap by mouth nightly. GLUCOSE BLOOD VI TEST STRIPS (ONETOUCH ULTRA TEST) STRIP    Check blood glucose as directed    METFORMIN ER (GLUCOPHAGE XR) 500 MG TABLET    take 2 tablets by mouth twice a day    MULTIVITAMIN (ONE A DAY) TABLET    Take 1 Tab by mouth daily. OMEPRAZOLE (PRILOSEC) 10 MG CAPSULE    take 1 capsule by mouth once daily    ONDANSETRON (ZOFRAN ODT) 4 MG DISINTEGRATING TABLET    Take 1 tablets every 6-8 hours as needed for nausea and vomiting.    These Medications have changed    No medications on file   Stop Taking    No medications on file     _______________________________

## 2021-01-27 ENCOUNTER — OFFICE VISIT (OUTPATIENT)
Dept: FAMILY MEDICINE CLINIC | Age: 61
End: 2021-01-27
Payer: COMMERCIAL

## 2021-01-27 VITALS
SYSTOLIC BLOOD PRESSURE: 130 MMHG | TEMPERATURE: 97.2 F | OXYGEN SATURATION: 98 % | HEIGHT: 64 IN | BODY MASS INDEX: 25.61 KG/M2 | WEIGHT: 150 LBS | HEART RATE: 74 BPM | RESPIRATION RATE: 16 BRPM | DIASTOLIC BLOOD PRESSURE: 84 MMHG

## 2021-01-27 DIAGNOSIS — T38.0X5S STEROID-INDUCED DIABETES MELLITUS, SEQUELA (HCC): ICD-10-CM

## 2021-01-27 DIAGNOSIS — E09.9 STEROID-INDUCED DIABETES MELLITUS, SEQUELA (HCC): ICD-10-CM

## 2021-01-27 DIAGNOSIS — I10 ESSENTIAL HYPERTENSION: ICD-10-CM

## 2021-01-27 DIAGNOSIS — G89.29 INSOMNIA SECONDARY TO CHRONIC PAIN: ICD-10-CM

## 2021-01-27 DIAGNOSIS — G47.01 INSOMNIA SECONDARY TO CHRONIC PAIN: ICD-10-CM

## 2021-01-27 DIAGNOSIS — Z00.00 ANNUAL PHYSICAL EXAM: Primary | ICD-10-CM

## 2021-01-27 DIAGNOSIS — Z87.19 HISTORY OF SMALL BOWEL OBSTRUCTION: ICD-10-CM

## 2021-01-27 DIAGNOSIS — R10.9 CHRONIC ABDOMINAL PAIN: ICD-10-CM

## 2021-01-27 DIAGNOSIS — E11.65 TYPE 2 DIABETES MELLITUS WITH HYPERGLYCEMIA, WITHOUT LONG-TERM CURRENT USE OF INSULIN (HCC): ICD-10-CM

## 2021-01-27 DIAGNOSIS — I10 ESSENTIAL HYPERTENSION, BENIGN: ICD-10-CM

## 2021-01-27 DIAGNOSIS — G89.29 CHRONIC ABDOMINAL PAIN: ICD-10-CM

## 2021-01-27 DIAGNOSIS — J45.40 MODERATE PERSISTENT ASTHMA WITHOUT COMPLICATION: ICD-10-CM

## 2021-01-27 DIAGNOSIS — R10.31 ABDOMINAL PAIN, RIGHT LOWER QUADRANT: ICD-10-CM

## 2021-01-27 PROCEDURE — 99396 PREV VISIT EST AGE 40-64: CPT | Performed by: FAMILY MEDICINE

## 2021-01-27 RX ORDER — ALBUTEROL SULFATE 0.83 MG/ML
2.5 SOLUTION RESPIRATORY (INHALATION)
Qty: 30 NEBULE | Refills: 1 | Status: SHIPPED | OUTPATIENT
Start: 2021-01-27

## 2021-01-27 RX ORDER — DOXEPIN HYDROCHLORIDE 50 MG/1
50 CAPSULE ORAL
Qty: 90 CAP | Refills: 3 | Status: SHIPPED | OUTPATIENT
Start: 2021-01-27 | End: 2021-11-24 | Stop reason: SDUPTHER

## 2021-01-27 RX ORDER — BISOPROLOL FUMARATE AND HYDROCHLOROTHIAZIDE 2.5; 6.25 MG/1; MG/1
TABLET ORAL
Qty: 90 TAB | Refills: 3 | Status: SHIPPED | OUTPATIENT
Start: 2021-01-27 | End: 2021-09-27 | Stop reason: SDUPTHER

## 2021-01-27 RX ORDER — AMLODIPINE BESYLATE 5 MG/1
5 TABLET ORAL DAILY
Qty: 90 TAB | Refills: 3 | Status: SHIPPED | OUTPATIENT
Start: 2021-01-27 | End: 2021-11-24 | Stop reason: SDUPTHER

## 2021-01-27 RX ORDER — PEN NEEDLE, DIABETIC 31 GX3/16"
NEEDLE, DISPOSABLE MISCELLANEOUS
Qty: 30 PEN NEEDLE | Refills: 2 | Status: SHIPPED | OUTPATIENT
Start: 2021-01-27

## 2021-01-27 RX ORDER — DULAGLUTIDE 0.75 MG/.5ML
0.75 INJECTION, SOLUTION SUBCUTANEOUS
Qty: 3 PEN | Refills: 1 | Status: SHIPPED | OUTPATIENT
Start: 2021-01-27 | End: 2021-06-02 | Stop reason: SDUPTHER

## 2021-01-27 NOTE — PROGRESS NOTES
Sylvester Estrella is a 61 y.o.  female and presents with    Chief Complaint   Patient presents with    Hypertension    Diabetes    Asthma    Annual Exam     Subjective: Well Adult Physical   Patient here for a comprehensive physical exam.The patient reports problems - diabetes, chronic abdominal with history of small bowel obstruction with last bowel movement last night  Do you take any herbs or supplements that were not prescribed by a doctor? yes Are you taking calcium supplements? yes Are you taking aspirin daily? not applicable    She was admitted for observation and for small bowel obstruction. She had NG tube inserted; she has been followed by dr. Uzma Ryder in patient. Diabetes Mellitus:  She has diabetes mellitus, and  Hyperlipidemia. Her fasting blood glucose last  nightwas 108  Diabetic ROS - medication compliance: compliant most of the time, diabetic diet compliance: noncompliant some of the time. Lab review: labs reviewed, I note that glycosylated hemoglobin abnormal high.      Depression Review:  Patient is seen for followup of depression. Treatment includes none and no other therapies. Ongoing symptoms include depressed mood, anhedonia, insomnia, fatigue, feelings of worthlessness/guilt, difficulty concentrating and hopelessness. She denies hypersomnia, impaired memory and recurrent thoughts of death.   She experiences the following side effects from the treatment: none. Anxiety Review:  Patient is seen for sleep disturbance. Current treatment includes doxepin and no other therapies. Ongoing symptoms include: insomnia. Patient denies: palpitations, sweating, chest pain, shortness of breath, dizziness, paresthesias, racing thoughts, psychomotor agitation, feelings of losing control, difficulty concentrating, suicidal ideation. Reported side effects from the treatment: none.     ROS   Constitutional: Negative for fever.    HENT: Negative for sore throat.    Eyes: Negative for visual disturbance. Respiratory: Negative for shortness of breath.    Cardiovascular: Negative for chest pain. Gastrointestinal: Positive for abdominal pain. Endocrine: Negative for polyuria. Genitourinary: Positive for frequency. Negative for difficulty urinating and hematuria. Musculoskeletal: Positive for back pain. Negative for gait problem. Skin: wound site healing  Allergic/Immunologic: Negative for immunocompromised state. Neurological: Negative for syncope. Psychiatric/Behavioral: Positive for sleep disturbance.   All other systems reviewed and are negative.     All other systems reviewed and are negative. Objective:  Vitals:    01/27/21 0829   BP: 130/84   Pulse: 74   Resp: 16   Temp: 97.2 °F (36.2 °C)   TempSrc: Temporal   SpO2: 98%   Weight: 150 lb (68 kg)   Height: 5' 4\" (1.626 m)   PainSc:  10 - Worst pain ever   PainLoc: Abdomen     BMI 25.75 kg/m²       General appearance  alert, cooperative, no distress, appears stated age   Head  Normocephalic, without obvious abnormality, atraumatic   Eyes  conjunctivae/corneas clear. PERRL, EOM's intact. Ears  normal TM's and external ear canals AU   Nose Nares normal. Septum midline. Mucosa normal. No drainage or sinus tenderness. Throat Lips, mucosa, and tongue normal. Teeth and gums normal   Neck supple, symmetrical, trachea midline, no adenopathy, thyroid: not enlarged, symmetric, no tenderness/mass/nodules, no carotid bruit and no JVD   Back   symmetric, no curvature. ROM normal. No CVA tenderness   Lungs   clear to auscultation bilaterally   Breasts  Not examined   Heart  regular rate and rhythm, S1, S2 normal, no murmur, click, rub or gallop   Abdomen   Soft, right lower quadrant ttp; + obturator sign.  Bowel sounds normal. No masses,  No organomegaly   Pelvic Deferred   Extremities extremities normal, atraumatic, no cyanosis or edema   Pulses 2+ and symmetric   Skin Skin color, texture, turgor normal. No rashes or lesions   Lymph nodes Cervical, supraclavicular, and axillary nodes normal.   Neurologic Normal     Diabetic foot exam:     Left Foot:   Visual Exam: normal ; post op changes   Pulse DP: 2+ (normal)   Filament test: normal sensation      Right Foot:   Visual Exam: normal   Post op changes   Pulse DP: 2+ (normal)   Filament test: normal sensation     LABS   hgb a1c  TESTS      Assessment/Plan:    1. Annual physical exam  Reviewed preventive recommendations    2. Abdominal pain, right lower quadrant  Continue fiber diet, hydration and exercise as tolerated. Stop metformin and start alternative to decrease abdominal pain    3. Chronic abdominal pain  Increase dose of doxepin for better pain control    4. History of small bowel obstruction      5. Essential hypertension  Goal <130/80  - bisoprolol-hydroCHLOROthiazide (ZIAC) 2.5-6.25 mg per tablet; take 1 tablet by mouth once daily  Dispense: 90 Tab; Refill: 3    6. Steroid-induced diabetes mellitus, sequela ([facility] [address]  Goal hgb a1c <7; start GLP1 for glucose control and discontinue metformin  - LIPID PANEL; Future  - HEMOGLOBIN A1C WITH EAG; Future  - MICROALBUMIN, UR, RAND W/ MICROALB/CREAT RATIO; Future  - HM DIABETES FOOT EXAM  - dulaglutide (Trulicity) 8.68 MR/3.2 mL sub-q pen; 0.5 mL by SubCUTAneous route every seven (7) days. Dispense: 3 Pen; Refill: 1  - Insulin Needles, Disposable, (Nia Pen Needle) 32 gauge x 5/32\" ndle; Give injection once a week  Dispense: 30 Pen Needle; Refill: 2    7. Insomnia secondary to chronic pain  Increase dose for better pain control  - doxepin (SINEquan) 50 mg capsule; Take 1 Cap by mouth nightly. Dispense: 90 Cap; Refill: 3    8. Moderate persistent asthma without complication  Continue inhaled therapy  - albuterol (PROVENTIL VENTOLIN) 2.5 mg /3 mL (0.083 %) nebu; 3 mL by Nebulization route every six (6) hours as needed for Wheezing. Dispense: 30 Nebule; Refill: 1    9.  Essential hypertension, benign    - amLODIPine (NORVASC) 5 mg tablet; Take 1 Tab by mouth daily. Dispense: 90 Tab; Refill: 3    10. Type 2 diabetes mellitus with hyperglycemia, without long-term current use of insulin (HCC)    - glucose blood VI test strips (OneTouch Ultra Test) strip; Check blood glucose as directed  Dispense: 100 Strip; Refill: 1    Lab review: orders written for new lab studies as appropriate; see orders      I have discussed the diagnosis with the patient and the intended plan as seen in the above orders. The patient has received an after-visit summary and questions were answered concerning future plans. I have discussed medication side effects and warnings with the patient as well. I have reviewed the plan of care with the patient, accepted their input and they are in agreement with the treatment goals.

## 2021-01-27 NOTE — PROGRESS NOTES
Abraham Zacarias presents today for   Chief Complaint   Patient presents with    Hypertension    Diabetes    Asthma       Is someone accompanying this pt? no    Is the patient using any DME equipment during OV? no    Depression Screening:  3 most recent PHQ Screens 1/27/2021   Little interest or pleasure in doing things Not at all   Feeling down, depressed, irritable, or hopeless Not at all   Total Score PHQ 2 0       Learning Assessment:  Learning Assessment 7/22/2019   PRIMARY LEARNER Patient   HIGHEST LEVEL OF EDUCATION - PRIMARY LEARNER  -   BARRIERS PRIMARY LEARNER NONE   CO-LEARNER CAREGIVER No   PRIMARY LANGUAGE ENGLISH   LEARNER PREFERENCE PRIMARY READING   ANSWERED BY Patient   RELATIONSHIP SELF       Abuse Screening:  Abuse Screening Questionnaire 10/14/2020   Do you ever feel afraid of your partner? N   Are you in a relationship with someone who physically or mentally threatens you? N   Is it safe for you to go home? Y       Fall Risk  Fall Risk Assessment, last 12 mths 10/14/2020   Able to walk? Yes   Fall in past 12 months? No   Number of falls in past 12 months -   Fall with injury? -       Health Maintenance reviewed and discussed and ordered per Provider. Health Maintenance Due   Topic Date Due    COVID-19 Vaccine (1 of 2) 02/04/1976    DTaP/Tdap/Td series (1 - Tdap) 02/04/1981    PAP AKA CERVICAL CYTOLOGY  02/04/1981    Shingrix Vaccine Age 50> (1 of 2) 02/04/2010    Eye Exam Retinal or Dilated  05/29/2019    MICROALBUMIN Q1  12/11/2020    Lipid Screen  12/11/2020    Foot Exam Q1  01/29/2021   . Coordination of Care:  1. Have you been to the ER, urgent care clinic since your last visit? Hospitalized since your last visit? Yes, 1/18/21, [facility], bowel obstruction    2. Have you seen or consulted any other health care providers outside of the [address] [place] [address] since your last visit? Include any pap smears or colon screening.  no      Last  Checked na  Last UDS Checked na  Last Pain contract signed: kee    Patient presents in office today for routine care.   Patient concerns: med refills

## 2021-01-28 ENCOUNTER — APPOINTMENT (OUTPATIENT)
Dept: CT IMAGING | Age: 61
End: 2021-01-28
Attending: EMERGENCY MEDICINE
Payer: COMMERCIAL

## 2021-01-28 ENCOUNTER — HOSPITAL ENCOUNTER (OUTPATIENT)
Age: 61
Setting detail: OBSERVATION
Discharge: HOME | End: 2021-01-31
Attending: EMERGENCY MEDICINE | Admitting: HOSPITALIST
Payer: COMMERCIAL

## 2021-01-28 DIAGNOSIS — R19.5 LOOSE BOWEL MOVEMENTS: ICD-10-CM

## 2021-01-28 DIAGNOSIS — R11.0 NAUSEA WITHOUT VOMITING: ICD-10-CM

## 2021-01-28 DIAGNOSIS — R10.31 ABDOMINAL PAIN, RIGHT LOWER QUADRANT: ICD-10-CM

## 2021-01-28 DIAGNOSIS — K56.609 SBO (SMALL BOWEL OBSTRUCTION) (HCC): Primary | ICD-10-CM

## 2021-01-28 DIAGNOSIS — Z87.19 HISTORY OF SMALL BOWEL OBSTRUCTION: ICD-10-CM

## 2021-01-28 LAB
ALBUMIN SERPL-MCNC: 3.5 G/DL (ref 3.4–5)
ALBUMIN/GLOB SERPL: 0.9 {RATIO} (ref 0.8–1.7)
ALP SERPL-CCNC: 141 U/L (ref 45–117)
ALT SERPL-CCNC: 29 U/L (ref 13–56)
ANION GAP SERPL CALC-SCNC: 5 MMOL/L (ref 3–18)
APPEARANCE UR: CLEAR
AST SERPL-CCNC: 20 U/L (ref 10–38)
AVG GLU, 10930: 145 MG/DL (ref 91–123)
BASOPHILS # BLD: 0 K/UL (ref 0–0.1)
BASOPHILS NFR BLD: 0 % (ref 0–2)
BILIRUB DIRECT SERPL-MCNC: <0.1 MG/DL (ref 0–0.2)
BILIRUB SERPL-MCNC: 0.4 MG/DL (ref 0.2–1)
BILIRUB UR QL: NEGATIVE
BUN SERPL-MCNC: 14 MG/DL (ref 7–18)
BUN/CREAT SERPL: 12 (ref 12–20)
CALCIUM SERPL-MCNC: 9.3 MG/DL (ref 8.5–10.1)
CHLORIDE SERPL-SCNC: 105 MMOL/L (ref 100–111)
CHOLEST SERPL-MCNC: 231 MG/DL (ref 110–200)
CO2 SERPL-SCNC: 27 MMOL/L (ref 21–32)
COLOR UR: YELLOW
CREAT SERPL-MCNC: 1.21 MG/DL (ref 0.6–1.3)
CREATININE, URINE: 91 MG/DL
DIFFERENTIAL METHOD BLD: ABNORMAL
EOSINOPHIL # BLD: 0.2 K/UL (ref 0–0.4)
EOSINOPHIL NFR BLD: 2 % (ref 0–5)
ERYTHROCYTE [DISTWIDTH] IN BLOOD BY AUTOMATED COUNT: 15 % (ref 11.6–14.5)
GLOBULIN SER CALC-MCNC: 4.1 G/DL (ref 2–4)
GLUCOSE SERPL-MCNC: 127 MG/DL (ref 74–99)
GLUCOSE UR STRIP.AUTO-MCNC: >1000 MG/DL
HBA1C MFR BLD HPLC: 6.7 % (ref 4.8–5.6)
HCT VFR BLD AUTO: 43 % (ref 35–45)
HDLC SERPL-MCNC: 4.4 MG/DL (ref 0–5)
HDLC SERPL-MCNC: 53 MG/DL
HGB BLD-MCNC: 13.2 G/DL (ref 12–16)
HGB UR QL STRIP: NEGATIVE
KETONES UR QL STRIP.AUTO: NEGATIVE MG/DL
LACTATE BLD-SCNC: 3.39 MMOL/L (ref 0.4–2)
LDL/HDL RATIO,LDHD: 2.5
LDLC SERPL CALC-MCNC: 135 MG/DL (ref 50–99)
LEUKOCYTE ESTERASE UR QL STRIP.AUTO: NEGATIVE
LIPASE SERPL-CCNC: 178 U/L (ref 73–393)
LYMPHOCYTES # BLD: 1.7 K/UL (ref 0.9–3.6)
LYMPHOCYTES NFR BLD: 24 % (ref 21–52)
MCH RBC QN AUTO: 25.6 PG (ref 24–34)
MCHC RBC AUTO-ENTMCNC: 30.7 G/DL (ref 31–37)
MCV RBC AUTO: 83.5 FL (ref 74–97)
MICROALB/CREAT RATIO, 140286: 27.5 (ref 0–30)
MICROALBUMIN,URINE RANDOM 140054: 25 MG/L (ref 0.1–17)
MONOCYTES # BLD: 0.4 K/UL (ref 0.05–1.2)
MONOCYTES NFR BLD: 5 % (ref 3–10)
NEUTS SEG # BLD: 4.9 K/UL (ref 1.8–8)
NEUTS SEG NFR BLD: 69 % (ref 40–73)
NITRITE UR QL STRIP.AUTO: NEGATIVE
NON-HDL CHOLESTEROL, 011976: 178 MG/DL
PH UR STRIP: 7 [PH] (ref 5–8)
PLATELET # BLD AUTO: 199 K/UL (ref 135–420)
PMV BLD AUTO: 11.5 FL (ref 9.2–11.8)
POTASSIUM SERPL-SCNC: 4.5 MMOL/L (ref 3.5–5.5)
PROT SERPL-MCNC: 7.6 G/DL (ref 6.4–8.2)
PROT UR STRIP-MCNC: NEGATIVE MG/DL
RBC # BLD AUTO: 5.15 M/UL (ref 4.2–5.3)
SODIUM SERPL-SCNC: 137 MMOL/L (ref 136–145)
SP GR UR REFRACTOMETRY: 1.02 (ref 1–1.03)
TRIGL SERPL-MCNC: 214 MG/DL (ref 40–149)
UROBILINOGEN UR QL STRIP.AUTO: 0.2 EU/DL (ref 0.2–1)
VLDLC SERPL CALC-MCNC: 43 MG/DL (ref 8–30)
WBC # BLD AUTO: 7.2 K/UL (ref 4.6–13.2)

## 2021-01-28 PROCEDURE — 80076 HEPATIC FUNCTION PANEL: CPT

## 2021-01-28 PROCEDURE — 99218 HC RM OBSERVATION: CPT

## 2021-01-28 PROCEDURE — 83690 ASSAY OF LIPASE: CPT

## 2021-01-28 PROCEDURE — 81003 URINALYSIS AUTO W/O SCOPE: CPT

## 2021-01-28 PROCEDURE — 74011250636 HC RX REV CODE- 250/636: Performed by: EMERGENCY MEDICINE

## 2021-01-28 PROCEDURE — 74011250636 HC RX REV CODE- 250/636: Performed by: HOSPITALIST

## 2021-01-28 PROCEDURE — 96374 THER/PROPH/DIAG INJ IV PUSH: CPT

## 2021-01-28 PROCEDURE — 83605 ASSAY OF LACTIC ACID: CPT

## 2021-01-28 PROCEDURE — 85025 COMPLETE CBC W/AUTO DIFF WBC: CPT

## 2021-01-28 PROCEDURE — 96376 TX/PRO/DX INJ SAME DRUG ADON: CPT

## 2021-01-28 PROCEDURE — 96375 TX/PRO/DX INJ NEW DRUG ADDON: CPT

## 2021-01-28 PROCEDURE — 74176 CT ABD & PELVIS W/O CONTRAST: CPT

## 2021-01-28 PROCEDURE — 82962 GLUCOSE BLOOD TEST: CPT

## 2021-01-28 PROCEDURE — 80048 BASIC METABOLIC PNL TOTAL CA: CPT

## 2021-01-28 PROCEDURE — 74011000258 HC RX REV CODE- 258: Performed by: HOSPITALIST

## 2021-01-28 PROCEDURE — 99285 EMERGENCY DEPT VISIT HI MDM: CPT

## 2021-01-28 RX ORDER — PROMETHAZINE HYDROCHLORIDE 25 MG/1
12.5 TABLET ORAL
Status: DISCONTINUED | OUTPATIENT
Start: 2021-01-28 | End: 2021-01-31 | Stop reason: HOSPADM

## 2021-01-28 RX ORDER — NALOXONE HYDROCHLORIDE 0.4 MG/ML
0.4 INJECTION, SOLUTION INTRAMUSCULAR; INTRAVENOUS; SUBCUTANEOUS AS NEEDED
Status: DISCONTINUED | OUTPATIENT
Start: 2021-01-28 | End: 2021-01-31 | Stop reason: HOSPADM

## 2021-01-28 RX ORDER — ENOXAPARIN SODIUM 100 MG/ML
40 INJECTION SUBCUTANEOUS DAILY
Status: DISCONTINUED | OUTPATIENT
Start: 2021-01-29 | End: 2021-01-31 | Stop reason: HOSPADM

## 2021-01-28 RX ORDER — MAGNESIUM SULFATE 100 %
4 CRYSTALS MISCELLANEOUS AS NEEDED
Status: DISCONTINUED | OUTPATIENT
Start: 2021-01-28 | End: 2021-01-31 | Stop reason: HOSPADM

## 2021-01-28 RX ORDER — POLYETHYLENE GLYCOL 3350 17 G/17G
17 POWDER, FOR SOLUTION ORAL DAILY PRN
Status: DISCONTINUED | OUTPATIENT
Start: 2021-01-28 | End: 2021-01-31 | Stop reason: HOSPADM

## 2021-01-28 RX ORDER — DEXTROSE MONOHYDRATE AND SODIUM CHLORIDE 5; .45 G/100ML; G/100ML
50 INJECTION, SOLUTION INTRAVENOUS CONTINUOUS
Status: DISCONTINUED | OUTPATIENT
Start: 2021-01-28 | End: 2021-01-30

## 2021-01-28 RX ORDER — SODIUM CHLORIDE 0.9 % (FLUSH) 0.9 %
5-40 SYRINGE (ML) INJECTION EVERY 8 HOURS
Status: DISCONTINUED | OUTPATIENT
Start: 2021-01-28 | End: 2021-01-31 | Stop reason: HOSPADM

## 2021-01-28 RX ORDER — ONDANSETRON 2 MG/ML
4 INJECTION INTRAMUSCULAR; INTRAVENOUS
Status: COMPLETED | OUTPATIENT
Start: 2021-01-28 | End: 2021-01-28

## 2021-01-28 RX ORDER — METOPROLOL TARTRATE 5 MG/5ML
5 INJECTION INTRAVENOUS
Status: DISCONTINUED | OUTPATIENT
Start: 2021-01-28 | End: 2021-01-31 | Stop reason: HOSPADM

## 2021-01-28 RX ORDER — MORPHINE SULFATE 4 MG/ML
4 INJECTION, SOLUTION INTRAMUSCULAR; INTRAVENOUS
Status: COMPLETED | OUTPATIENT
Start: 2021-01-28 | End: 2021-01-28

## 2021-01-28 RX ORDER — ONDANSETRON 2 MG/ML
4 INJECTION INTRAMUSCULAR; INTRAVENOUS
Status: DISCONTINUED | OUTPATIENT
Start: 2021-01-28 | End: 2021-01-31 | Stop reason: HOSPADM

## 2021-01-28 RX ORDER — ACETAMINOPHEN 325 MG/1
650 TABLET ORAL
Status: DISCONTINUED | OUTPATIENT
Start: 2021-01-28 | End: 2021-01-31 | Stop reason: HOSPADM

## 2021-01-28 RX ORDER — MORPHINE SULFATE 2 MG/ML
2 INJECTION, SOLUTION INTRAMUSCULAR; INTRAVENOUS ONCE
Status: COMPLETED | OUTPATIENT
Start: 2021-01-29 | End: 2021-01-28

## 2021-01-28 RX ORDER — INSULIN LISPRO 100 [IU]/ML
INJECTION, SOLUTION INTRAVENOUS; SUBCUTANEOUS EVERY 6 HOURS
Status: DISCONTINUED | OUTPATIENT
Start: 2021-01-29 | End: 2021-01-31 | Stop reason: HOSPADM

## 2021-01-28 RX ORDER — SODIUM CHLORIDE 0.9 % (FLUSH) 0.9 %
5-40 SYRINGE (ML) INJECTION AS NEEDED
Status: DISCONTINUED | OUTPATIENT
Start: 2021-01-28 | End: 2021-01-31 | Stop reason: HOSPADM

## 2021-01-28 RX ORDER — ALBUTEROL SULFATE 0.83 MG/ML
2.5 SOLUTION RESPIRATORY (INHALATION)
Status: DISCONTINUED | OUTPATIENT
Start: 2021-01-28 | End: 2021-01-31 | Stop reason: HOSPADM

## 2021-01-28 RX ORDER — DEXTROSE MONOHYDRATE 25 G/50ML
25-50 INJECTION, SOLUTION INTRAVENOUS AS NEEDED
Status: DISCONTINUED | OUTPATIENT
Start: 2021-01-28 | End: 2021-01-31 | Stop reason: HOSPADM

## 2021-01-28 RX ORDER — ACETAMINOPHEN 650 MG/1
650 SUPPOSITORY RECTAL
Status: DISCONTINUED | OUTPATIENT
Start: 2021-01-28 | End: 2021-01-31 | Stop reason: HOSPADM

## 2021-01-28 RX ADMIN — Medication 10 ML: at 23:51

## 2021-01-28 RX ADMIN — MORPHINE SULFATE 2 MG: 2 INJECTION, SOLUTION INTRAMUSCULAR; INTRAVENOUS at 23:47

## 2021-01-28 RX ADMIN — SODIUM CHLORIDE 1000 ML: 9 INJECTION, SOLUTION INTRAVENOUS at 20:12

## 2021-01-28 RX ADMIN — ONDANSETRON 4 MG: 2 INJECTION INTRAMUSCULAR; INTRAVENOUS at 18:38

## 2021-01-28 RX ADMIN — SODIUM CHLORIDE 1000 ML: 9 INJECTION, SOLUTION INTRAVENOUS at 21:14

## 2021-01-28 RX ADMIN — MORPHINE SULFATE 4 MG: 4 INJECTION, SOLUTION INTRAMUSCULAR; INTRAVENOUS at 18:39

## 2021-01-28 RX ADMIN — DEXTROSE MONOHYDRATE AND SODIUM CHLORIDE 50 ML/HR: 5; .45 INJECTION, SOLUTION INTRAVENOUS at 23:47

## 2021-01-28 NOTE — ED PROVIDER NOTES
[facility] EMERGENCY DEPT      6:18 PM    Date: 1/28/2021  Patient Name: Pat Carreon    History of Presenting Illness     Chief Complaint   Patient presents with    Abdominal Pain    Headache    Nausea    Diarrhea       61 y.o. female with noted past medical history who presents to the emergency department with nausea loose bowel movements and right lower quadrant abdominal pain. Patient states that she has had prior to small bowel structures with prior surgery by a [place] surgeon. She states that this morning when she woke up started having some nausea without vomiting along with some right lower quadrant abdominal pain which she is had in the past.  She states this feels similar to her 2 prior episodes of small bowel struck her throat she was admitted. She denies any formed stools today but has had loose bowel movements. No other complaints. The patient denies recent travel outside [place] and denies recent travel to areas large social gatherings. The patient denies any known history of Covid 19 exposure. Patient denies any other associated signs or symptoms. Patient denies any other complaints. Nursing notes regarding the HPI and triage nursing notes were reviewed. Prior medical records were reviewed. Current Facility-Administered Medications   Medication Dose Route Frequency Provider Last Rate Last Admin    sodium chloride 0.9 % bolus infusion 1,000 mL  1,000 mL IntraVENous ONCE Anmol Thakkar MD        sodium chloride 0.9 % bolus infusion 1,000 mL  1,000 mL IntraVENous ONCE Anmol Thakkar MD         Current Outpatient Medications   Medication Sig Dispense Refill    dulaglutide (Trulicity) 2.46 YH/1.1 mL sub-q pen 0.5 mL by SubCUTAneous route every seven (7) days. 3 Pen 1    Insulin Needles, Disposable, (Nia Pen Needle) 32 gauge x 5/32\" ndle Give injection once a week 30 Pen Needle 2    doxepin (SINEquan) 50 mg capsule Take 1 Cap by mouth nightly. 90 Cap 3    amLODIPine (NORVASC) 5 mg tablet Take 1 Tab by mouth daily. 90 Tab 3    albuterol sulfate (ProAir RespiClick) 90 mcg/actuation breath activated inhaler Take 2 Puffs by inhalation every four (4) hours as needed for Wheezing, Shortness of Breath or Cough. 1 Inhaler 5    albuterol (PROVENTIL VENTOLIN) 2.5 mg /3 mL (0.083 %) nebu 3 mL by Nebulization route every six (6) hours as needed for Wheezing. 30 Nebule 1    glucose blood VI test strips (OneTouch Ultra Test) strip Check blood glucose as directed 100 Strip 1    bisoprolol-hydroCHLOROthiazide (ZIAC) 2.5-6.25 mg per tablet take 1 tablet by mouth once daily 90 Tab 3    ondansetron (Zofran ODT) 4 mg disintegrating tablet Take 1-2 tablets every 6-8 hours as needed for nausea and vomiting. 15 Tab 0    ondansetron (Zofran ODT) 4 mg disintegrating tablet Take 1 tablets every 6-8 hours as needed for nausea and vomiting. 30 Tab 1    omeprazole (PRILOSEC) 10 mg capsule take 1 capsule by mouth once daily 90 Cap 4    dapagliflozin (FARXIGA) 10 mg tab tablet Take 10 mg by mouth Every morning.  multivitamin (ONE A DAY) tablet Take 1 Tab by mouth daily.          Past History     Past Medical History:  Past Medical History:   Diagnosis Date    Abdominal adhesions     Adnexal cyst 7/30/2019    Left    Anemia     Asthma     Chronic pelvic pain in female 7/30/2019    Diabetes mellitus     Dyskinesia     bilateral    Essential hypertension     GERD (gastroesophageal reflux disease)     Hypertension     Menopause     Microhematuria     Rheumatoid arteritis ([facility] [address] 2018    Stool color black        Past Surgical History:  Past Surgical History:   Procedure Laterality Date    COLONOSCOPY N/A 1/21/2019    COLONOS[facility] performed [facility] Leigha Garvin [facility] ENDOSCOPY    HX CHOLECYSTECTOMY  6/28/10    HX COLONOSCOPY      HX ENDOSCOPY      HX HERNIA REPAIR      HX HYSTERECTOMY  1989    Fibroids    HX KNEE REPLACEMENT Left     HX KNEE REPLACEMENT Right 06/2018    HX OOPHORECTOMY  12/2000    HX ORTHOPAEDIC Right     ankle- multiple surgeries    HX SMALL BOWEL RESECTION  12/2000    HX SMALL BOWEL RESECTION  02/21/2017    Dr. Aiyana Styles         Family History:  Family History   Problem Relation Age of Onset    Hypertension Other         parent    Breast Cancer Other 21    Heart Disease Father     Diabetes Father     Lung Disease Father     Diabetes Mother     Hypertension Other         sibling    Diabetes Other         parent    Kidney Disease Brother     Diabetes Brother     Lung Disease Brother        Social History:  Social History     Tobacco Use    Smoking status: Never Smoker    Smokeless tobacco: Never Used   Substance Use Topics    Alcohol use: No    Drug use: No       Allergies: Allergies   Allergen Reactions    Macrodantin [Nitrofurantoin Macrocrystalline] Itching and Other (comments)    Tape [Adhesive] Other (comments)     Paper tape-- feels like burning skin  Burned skin when had wound vac       Patient's primary care provider (as noted in EPIC):  Patricia Friedman MD    Review of Systems    Visit Vitals  /81   Pulse (!) 105   Temp 98.7 °F (37.1 °C)   Resp 16   Ht 5' 4\" (1.626 m)   Wt 68 kg (150 lb)   SpO2 98%   BMI 25.75 kg/m²       Patient Vitals for the past 12 hrs:   Temp Pulse Resp BP SpO2   01/28/21 1915    130/81 98 %   01/28/21 1909    128/82    01/28/21 1800 98.7 °F (37.1 °C)       01/28/21 1759  (!) 105 16 (!) 150/87 100 %       PHYSICAL EXAM:    CONSTITUTIONAL:  Alert, in no apparent distress;  well developed;  well nourished. HEAD:  Normocephalic, atraumatic. EYES:  EOMI. Non-icteric sclera. Normal conjunctiva. ENTM:  Nose:  no rhinorrhea. Throat:  no erythema or exudate, mucous membranes moist.  NECK:  No JVD. Supple  RESPIRATORY:  Chest clear, equal breath sounds, good air movement. CARDIOVASCULAR:  Regular rate and rhythm. No murmurs, rubs, or gallops.     GI: Normal bowel sounds, abdomen soft with focal right lower quadrant moderate tenderness to palpation. No rebound or guarding. BACK:  Non-tender. UPPER EXT:  Normal inspection. LOWER EXT:  No edema, no calf tenderness. Distal pulses intact. NEURO:  Moves all four extremities, and grossly normal motor exam.  SKIN:  No rashes;  Normal for age. PSYCH:  Alert and normal affect. DIFFERENTIAL DIAGNOSES/ MEDICAL DECISION MAKING:  Gastritis, gerd, peptic ulcer disease, cholecystitis, pancreatitis, gastroenteritis, hepatitis, constipation related pain, appendicitis pain, diverticulitis, urinary tract infection, obstruction, abdominal wall pain, or combination of the above versus many other processes. In female patients, also consider ovarian cyst pain, ovarian torsion, pelvic inflammatory disease, other sources of gyn pain such as uterine fibroids, versus combination of the above and/or numerous other processes/ etiologies. Diagnostic Study Results     Abnormal lab results from this emergency department encounter:  Labs Reviewed   METABOLIC PANEL, BASIC - Abnormal; Notable for the following components:       Result Value    Glucose 127 (*)     GFR est AA 55 (*)     GFR est non-AA 45 (*)     All other components within normal limits   HEPATIC FUNCTION PANEL - Abnormal; Notable for the following components:    Globulin 4.1 (*)     Alk.  phosphatase 141 (*)     All other components within normal limits   URINALYSIS W/ RFLX MICROSCOPIC - Abnormal; Notable for the following components:    Glucose >1,000 (*)     All other components within normal limits   CBC WITH AUTOMATED DIFF - Abnormal; Notable for the following components:    MCHC 30.7 (*)     RDW 15.0 (*)     All other components within normal limits   POC LACTIC ACID - Abnormal; Notable for the following components:    Lactic Acid (POC) 3.39 (*)     All other components within normal limits   LIPASE       Lab values for this patient within approximately the last 12 hours:  Recent Results (from the past 12 hour(s))   URINALYSIS W/ RFLX MICROSCOPIC    Collection Time: 01/28/21  6:02 PM   Result Value Ref Range    Color YELLOW      Appearance CLEAR      Specific gravity 1.024 1.005 - 1.030      pH (UA) 7.0 5.0 - 8.0      Protein Negative NEG mg/dL    Glucose >1,000 (A) NEG mg/dL    Ketone Negative NEG mg/dL    Bilirubin Negative NEG      Blood Negative NEG      Urobilinogen 0.2 0.2 - 1.0 EU/dL    Nitrites Negative NEG      Leukocyte Esterase Negative NEG     METABOLIC PANEL, BASIC    Collection Time: 01/28/21  6:28 PM   Result Value Ref Range    Sodium 137 136 - 145 mmol/L    Potassium 4.5 3.5 - 5.5 mmol/L    Chloride 105 100 - 111 mmol/L    CO2 27 21 - 32 mmol/L    Anion gap 5 3.0 - 18 mmol/L    Glucose 127 (H) 74 - 99 mg/dL    BUN 14 7.0 - 18 MG/DL    Creatinine 1.21 0.6 - 1.3 MG/DL    BUN/Creatinine ratio 12 12 - 20      GFR est AA 55 (L) >60 ml/min/1.73m2    GFR est non-AA 45 (L) >60 ml/min/1.73m2    Calcium 9.3 8.5 - 10.1 MG/DL   LIPASE    Collection Time: 01/28/21  6:28 PM   Result Value Ref Range    Lipase 178 73 - 393 U/L   HEPATIC FUNCTION PANEL    Collection Time: 01/28/21  6:28 PM   Result Value Ref Range    Protein, total 7.6 6.4 - 8.2 g/dL    Albumin 3.5 3.4 - 5.0 g/dL    Globulin 4.1 (H) 2.0 - 4.0 g/dL    A-G Ratio 0.9 0.8 - 1.7      Bilirubin, total 0.4 0.2 - 1.0 MG/DL    Bilirubin, direct <0.1 0.0 - 0.2 MG/DL    Alk.  phosphatase 141 (H) 45 - 117 U/L    AST (SGOT) 20 10 - 38 U/L    ALT (SGPT) 29 13 - 56 U/L   POC LACTIC ACID    Collection Time: 01/28/21  6:31 PM   Result Value Ref Range    Lactic Acid (POC) 3.39 (HH) 0.40 - 2.00 mmol/L   CBC WITH AUTOMATED DIFF    Collection Time: 01/28/21  7:05 PM   Result Value Ref Range    WBC 7.2 4.6 - 13.2 K/uL    RBC 5.15 4.20 - 5.30 M/uL    HGB 13.2 12.0 - 16.0 g/dL    HCT 43.0 35.0 - 45.0 %    MCV 83.5 74.0 - 97.0 FL    MCH 25.6 24.0 - 34.0 PG    MCHC 30.7 (L) 31.0 - 37.0 g/dL    RDW 15.0 (H) 11.6 - 14.5 % PLATELET 283 799 - 150 K/uL    MPV 11.5 9.2 - 11.8 FL    NEUTROPHILS 69 40 - 73 %    LYMPHOCYTES 24 21 - 52 %    MONOCYTES 5 3 - 10 %    EOSINOPHILS 2 0 - 5 %    BASOPHILS 0 0 - 2 %    ABS. NEUTROPHILS 4.9 1.8 - 8.0 K/UL    ABS. LYMPHOCYTES 1.7 0.9 - 3.6 K/UL    ABS. MONOCYTES 0.4 0.05 - 1.2 K/UL    ABS. EOSINOPHILS 0.2 0.0 - 0.4 K/UL    ABS. BASOPHILS 0.0 0.0 - 0.1 K/UL    DF AUTOMATED         Radiologist and cardiologist interpretations if available at time of this note:  No results found. CT abdomen pelvis without contrast shows mildly fluid-filled loops of small bowel near the anastomosis site concerning for possible early small bowel obstruction. Medication(s) ordered for patient during this emergency visit encounter:  Medications   sodium chloride 0.9 % bolus infusion 1,000 mL (has no administration in time range)   sodium chloride 0.9 % bolus infusion 1,000 mL (has no administration in time range)   ondansetron (ZOFRAN) injection 4 mg (4 mg IntraVENous Given 1/28/21 1838)   morphine injection 4 mg (4 mg IntraVENous Given 1/28/21 1839)       Medical Decision Making     I am the first provider for this patient. I reviewed the vital signs, available nursing notes, past medical history, past surgical history, family history and social history. Vital Signs:  Reviewed the patient's vital signs. ED COURSE AND MEDICAL DECISION MAKING:    The patient's pain improved in the ED with the noted medications. On reassessment of the patient, the patient continues to have no surgical abdomen with no rebound nor guarding. The patient does not appear septic by presentation, vital signs and laboratory results. The patient continues to appear non-toxic in the emergency department on reevaluations. Consult to Surgery    The patient was presented to the on call surgeon, Dr. Ike Rivero who accepts patient as consult.      Based on the patient's history of presenting illness, physical examination, laboratory, radiographic, and/or tests results, and response to medical interventions, I believe the patient most likely is having symptoms as noted secondary to a small bowel obstruction. The patient will be admitted to the patient's surgeon or surgery service as noted. Admit to Hospitalist    The patient was presented to the accepting hospitalist, Dr. Severa Marking. The patient's primary doctor is Dustin Chin MD, and admissions for this physician are with the hospitalist.  If the patient has no primary doctor, then admission is to the hospitalist as well. As the emergency physician, I wrote courtesy admission orders for the hospitalist physician. The courtesy orders included explicit instructions for the floor nursing staff to call the admitting attending physician upon patient arrival on the floor. Coding Diagnoses     Clinical Impression:   1. SBO (small bowel obstruction) (HCC)    2. Abdominal pain, right lower quadrant    3. Nausea without vomiting    4. Loose bowel movements    5. History of small bowel obstruction        Disposition     Disposition:  Admit. NEHAL Hobbs Board Certified Emergency Physician    Provider Attestation:  If a scribe was utilized in generation of this patient record, I personally performed the services described in the documentation, reviewed the documentation, as recorded by the scribe in my presence, and it accurately records the patient's history of presenting illness, review of systems, patient physical examination, and procedures performed by me as the attending physician. NEHAL Hobbs Board Certified Emergency Physician  1/28/2021.  7:58 PM

## 2021-01-29 LAB
ANION GAP SERPL CALC-SCNC: 3 MMOL/L (ref 3–18)
BUN SERPL-MCNC: 12 MG/DL (ref 7–18)
BUN/CREAT SERPL: 12 (ref 12–20)
CALCIUM SERPL-MCNC: 8.3 MG/DL (ref 8.5–10.1)
CHLORIDE SERPL-SCNC: 109 MMOL/L (ref 100–111)
CO2 SERPL-SCNC: 27 MMOL/L (ref 21–32)
CREAT SERPL-MCNC: 1.04 MG/DL (ref 0.6–1.3)
ERYTHROCYTE [DISTWIDTH] IN BLOOD BY AUTOMATED COUNT: 15.1 % (ref 11.6–14.5)
GLUCOSE BLD STRIP.AUTO-MCNC: 100 MG/DL (ref 70–110)
GLUCOSE BLD STRIP.AUTO-MCNC: 101 MG/DL (ref 70–110)
GLUCOSE BLD STRIP.AUTO-MCNC: 105 MG/DL (ref 70–110)
GLUCOSE BLD STRIP.AUTO-MCNC: 112 MG/DL (ref 70–110)
GLUCOSE BLD STRIP.AUTO-MCNC: 151 MG/DL (ref 70–110)
GLUCOSE SERPL-MCNC: 105 MG/DL (ref 74–99)
HCT VFR BLD AUTO: 34.9 % (ref 35–45)
HGB BLD-MCNC: 10.3 G/DL (ref 12–16)
MAGNESIUM SERPL-MCNC: 1.9 MG/DL (ref 1.6–2.6)
MCH RBC QN AUTO: 24.8 PG (ref 24–34)
MCHC RBC AUTO-ENTMCNC: 29.5 G/DL (ref 31–37)
MCV RBC AUTO: 84.1 FL (ref 74–97)
PLATELET # BLD AUTO: 278 K/UL (ref 135–420)
PMV BLD AUTO: 10.9 FL (ref 9.2–11.8)
POTASSIUM SERPL-SCNC: 4.3 MMOL/L (ref 3.5–5.5)
RBC # BLD AUTO: 4.15 M/UL (ref 4.2–5.3)
SODIUM SERPL-SCNC: 139 MMOL/L (ref 136–145)
WBC # BLD AUTO: 7.1 K/UL (ref 4.6–13.2)

## 2021-01-29 PROCEDURE — 74011250636 HC RX REV CODE- 250/636

## 2021-01-29 PROCEDURE — 77030040361 HC SLV COMPR DVT MDII -B

## 2021-01-29 PROCEDURE — 36415 COLL VENOUS BLD VENIPUNCTURE: CPT

## 2021-01-29 PROCEDURE — 80048 BASIC METABOLIC PNL TOTAL CA: CPT

## 2021-01-29 PROCEDURE — 96372 THER/PROPH/DIAG INJ SC/IM: CPT

## 2021-01-29 PROCEDURE — 99218 HC RM OBSERVATION: CPT

## 2021-01-29 PROCEDURE — 85027 COMPLETE CBC AUTOMATED: CPT

## 2021-01-29 PROCEDURE — 2709999900 HC NON-CHARGEABLE SUPPLY

## 2021-01-29 PROCEDURE — 99218 PR INITIAL OBSERVATION CARE/DAY 30 MINUTES: CPT | Performed by: SURGERY

## 2021-01-29 PROCEDURE — 74011000258 HC RX REV CODE- 258: Performed by: HOSPITALIST

## 2021-01-29 PROCEDURE — 83735 ASSAY OF MAGNESIUM: CPT

## 2021-01-29 PROCEDURE — 96376 TX/PRO/DX INJ SAME DRUG ADON: CPT

## 2021-01-29 PROCEDURE — 74011250636 HC RX REV CODE- 250/636: Performed by: INTERNAL MEDICINE

## 2021-01-29 PROCEDURE — 74011250636 HC RX REV CODE- 250/636: Performed by: HOSPITALIST

## 2021-01-29 PROCEDURE — 82962 GLUCOSE BLOOD TEST: CPT

## 2021-01-29 PROCEDURE — 74011636637 HC RX REV CODE- 636/637: Performed by: HOSPITALIST

## 2021-01-29 RX ORDER — MORPHINE SULFATE 2 MG/ML
INJECTION, SOLUTION INTRAMUSCULAR; INTRAVENOUS
Status: COMPLETED
Start: 2021-01-29 | End: 2021-01-29

## 2021-01-29 RX ORDER — MORPHINE SULFATE 2 MG/ML
2 INJECTION, SOLUTION INTRAMUSCULAR; INTRAVENOUS
Status: DISCONTINUED | OUTPATIENT
Start: 2021-01-29 | End: 2021-01-31 | Stop reason: HOSPADM

## 2021-01-29 RX ADMIN — ENOXAPARIN SODIUM 40 MG: 40 INJECTION SUBCUTANEOUS at 10:01

## 2021-01-29 RX ADMIN — Medication 10 ML: at 23:22

## 2021-01-29 RX ADMIN — MORPHINE SULFATE 2 MG: 2 INJECTION, SOLUTION INTRAMUSCULAR; INTRAVENOUS at 16:11

## 2021-01-29 RX ADMIN — MORPHINE SULFATE 2 MG: 2 INJECTION, SOLUTION INTRAMUSCULAR; INTRAVENOUS at 11:01

## 2021-01-29 RX ADMIN — MORPHINE SULFATE 2 MG: 2 INJECTION, SOLUTION INTRAMUSCULAR; INTRAVENOUS at 23:28

## 2021-01-29 RX ADMIN — DEXTROSE MONOHYDRATE AND SODIUM CHLORIDE 50 ML/HR: 5; .45 INJECTION, SOLUTION INTRAVENOUS at 19:49

## 2021-01-29 RX ADMIN — Medication 10 ML: at 14:20

## 2021-01-29 RX ADMIN — MORPHINE SULFATE 2 MG: 2 INJECTION, SOLUTION INTRAMUSCULAR; INTRAVENOUS at 19:49

## 2021-01-29 RX ADMIN — INSULIN LISPRO 2 UNITS: 100 INJECTION, SOLUTION INTRAVENOUS; SUBCUTANEOUS at 18:10

## 2021-01-29 RX ADMIN — Medication 10 ML: at 06:00

## 2021-01-29 NOTE — ED NOTES
Lav top drawn by ED tech and sent to lab. Pt denies any needs at this time. Resting in bed. Report given to DEPARTMENT [facility]. No longer primary RN.

## 2021-01-29 NOTE — PROGRESS NOTES
Hospitalist Progress Note             Date of Service:  2021  NAME:  Salome Casanova  :  1960  MRN:  [id number]    Assessment   Active Problems:    SBO (small bowel obstruction) ([facility] (2017)              Plan      SBO- partial              - on prelim CT              - surgery consulted- consult reviewed, no surgery indicated              - start full liquids, advance as tolerated              - zofran as needed              - having N and abdominal pain, no vomiting  - prn morphine     DM              - SSI     DISPO  -Pt to be admitted  at this time for reasons addressed above, continued hospitalization for ongoing assessment and treatment indicated         Hospital Problems  Date Reviewed: 10/7/2020          Codes Class Noted POA    * (Principal) SBO (small bowel obstruction) ([facility] ICD-10-CM: G03.683  ICD-9-CM: 560.9  2017 Unknown                Review of Systems:   A comprehensive review of systems was negative except for that written in the HPI. No vomiting, passing gas      Vital Signs:    Last 24hrs VS reviewed since prior progress note. Most recent are:  Visit Vitals  /73   Pulse 81   Temp 98.3 °F (36.8 °C)   Resp 18   Ht 5' 4\" (1.626 m)   Wt 68 kg (150 lb)   SpO2 100%   BMI 25.75 kg/m²         Intake/Output Summary (Last 24 hours) at 2021 1232  Last data filed at 2021 2300  Gross per 24 hour   Intake 1000 ml   Output 250 ml   Net 750 ml        Physical Examination:             General:          Alert, cooperative, no distress, appears stated age. HEENT:           Atraumatic, anicteric sclerae, pink conjunctivae                          No oral ulcers, mucosa moist, throat clear, dentition fair  Neck:               Supple, symmetrical  Lungs:             Clear to auscultation bilaterally. No Wheezing or Rhonchi. No rales.   Chest wall:      No tenderness  No Accessory muscle use. Heart:              Regular  rhythm,  No  murmur   No edema  Abdomen:        Soft, non-tender. Not distended. Bowel sounds normal  Extremities:     No cyanosis. No clubbing,                            Skin turgor normal, Capillary refill normal  Skin:                Not pale. Not Jaundiced  No rashes   Psych:             Not anxious or agitated. Neurologic:      Alert, moves all extremities, answers questions appropriately and responds to commands        Data Review:    Review and/or order of clinical lab test  Review and/or order of tests in the radiology section of CPT  Review and/or order of tests in the medicine section of CPT      Labs:     Recent Labs     01/29/21  0355 01/28/21  1905   WBC 7.1 7.2   HGB 10.3* 13.2   HCT 34.9* 43.0    199     Recent Labs     01/29/21  0355 01/28/21  1828    137   K 4.3 4.5    105   CO2 27 27   BUN 12 14   CREA 1.04 1.21   * 127*   CA 8.3* 9.3   MG 1.9  --      Recent Labs     01/28/21  1828   ALT 29   *   TBILI 0.4   TP 7.6   ALB 3.5   GLOB 4.1*   LPSE 178     No results for input(s): INR, PTP, APTT, INREXT in the last 72 hours. No results for input(s): FE, TIBC, PSAT, FERR in the last 72 hours. No results found for: FOL, RBCF   No results for input(s): PH, PCO2, PO2 in the last 72 hours. No results for input(s): CPK, CKNDX, TROIQ in the last 72 hours.     No lab exists for component: CPKMB  Lab Results   Component Value Date/Time    Cholesterol, total 231 (H) 01/27/2021 09:23 AM    HDL Cholesterol 53 01/27/2021 09:23 AM    LDL, calculated 135 (H) 01/27/2021 09:23 AM    Triglyceride 214 (H) 01/27/2021 09:23 AM     Lab Results   Component Value Date/Time    Glucose (POC) 105 01/29/2021 11:50 AM    Glucose (POC) 112 (H) 01/29/2021 06:17 AM    Glucose (POC) 101 01/28/2021 11:50 PM    Glucose (POC) 114 (H) 10/23/2020 08:11 AM    Glucose (POC) 193 (H) 10/22/2020 09:34 PM    Glucose,  (H) 02/21/2017 01:32 PM    Glucose, POC 124 (H) 01/22/2017 02:09 PM     Lab Results   Component Value Date/Time    Color YELLOW 01/28/2021 06:02 PM    Appearance CLEAR 01/28/2021 06:02 PM    Specific gravity 1.024 01/28/2021 06:02 PM    pH (UA) 7.0 01/28/2021 06:02 PM    Protein Negative 01/28/2021 06:02 PM    Glucose >1,000 (A) 01/28/2021 06:02 PM    Ketone Negative 01/28/2021 06:02 PM    Bilirubin Negative 01/28/2021 06:02 PM    Urobilinogen 0.2 01/28/2021 06:02 PM    Nitrites Negative 01/28/2021 06:02 PM    Leukocyte Esterase Negative 01/28/2021 06:02 PM    Epithelial cells 1+ 12/29/2020 02:10 PM    Bacteria FEW (A) 12/29/2020 02:10 PM    WBC 0 to 3 12/29/2020 02:10 PM    RBC Negative 12/29/2020 02:10 PM         Medications Reviewed:     Current Facility-Administered Medications   Medication Dose Route Frequency    morphine injection 2 mg  2 mg IntraVENous Q3H PRN    morphine 2 mg/mL injection        albuterol (PROVENTIL VENTOLIN) nebulizer solution 2.5 mg  2.5 mg Nebulization Q6H PRN    dextrose 5 % - 0.45% NaCl infusion  50 mL/hr IntraVENous CONTINUOUS    sodium chloride (NS) flush 5-40 mL  5-40 mL IntraVENous Q8H    sodium chloride (NS) flush 5-40 mL  5-40 mL IntraVENous PRN    acetaminophen (TYLENOL) tablet 650 mg  650 mg Oral Q6H PRN    Or    acetaminophen (TYLENOL) suppository 650 mg  650 mg Rectal Q6H PRN    polyethylene glycol (MIRALAX) packet 17 g  17 g Oral DAILY PRN    promethazine (PHENERGAN) tablet 12.5 mg  12.5 mg Oral Q6H PRN    Or    ondansetron (ZOFRAN) injection 4 mg  4 mg IntraVENous Q6H PRN    enoxaparin (LOVENOX) injection 40 mg  40 mg SubCUTAneous DAILY    glucose chewable tablet 16 g  4 Tab Oral PRN    glucagon (GLUCAGEN) injection 1 mg  1 mg IntraMUSCular PRN    dextrose (D50) infusion 12.5-25 g  25-50 mL IntraVENous PRN    insulin lispro (HUMALOG) injection   SubCUTAneous Q6H    enalaprilat (VASOTEC) 2.5 mg in 0.9% sodium chloride 50 mL IVPB  2.5 mg IntraVENous Q6H PRN    metoprolol (LOPRESSOR) injection 5 mg 5 mg IntraVENous Q6H PRN    naloxone (NARCAN) injection 0.4 mg  0.4 mg IntraVENous PRN     ______________________________________________________________________  EXPECTED LENGTH OF STAY: - - -  ACTUAL LENGTH OF STAY:          0                 Mina Salamanca MD

## 2021-01-29 NOTE — H&P
Medicine History and Physical    Patient: Trung Phillips   Age:  61 y.o. Assessment   Active Problems:    SBO (small bowel obstruction) ([facility]) (2/21/2017)          Plan     SBO   - on prelim CT   - surgery consulted   - npo, ivf   - zofran as needed   - having N and abdominal pain, no vomiting    DM   - SSI    DISPO  -Pt to be admitted  at this time for reasons addressed above, continued hospitalization for ongoing assessment and treatment indicated     Anticipated Date of Discharge: 2 or >  Anticipated Disposition (home, SNF) : home    Chief Complaint:   Chief Complaint   Patient presents with    Abdominal Pain    Headache    Nausea    Diarrhea         HPI:   Trung Phillips is a 61y.o. year old female who presents with  Nausea and abdominal pain since yesterday. She has not vomited. She has had surgery by Dr. Emma Fay in the past.  She has had SBO several times. Her abdominal pain is diffuse but worse in lower quadrants , she is mildly - moderately distended. She had a loose BM today. Review of Systems - positive responses in bold type   Constitutional: Negative for fever, chills, diaphoresis and unexpected weight change. HENT: Negative for ear pain, congestion, sore throat, rhinorrhea, drooling, trouble swallowing, neck pain and tinnitus. Eyes: Negative for photophobia, pain, redness and visual disturbance. Respiratory: negative for shortness of breath, cough, choking, chest tightness, wheezing or stridor. Cardiovascular: Negative for chest pain, palpitations and leg swelling. Gastrointestinal: Negative for nausea, vomiting, abdominal pain, diarrhea, constipation, blood in stool, abdominal distention and anal bleeding. Genitourinary: Negative for dysuria, urgency, frequency, hematuria, flank pain and difficulty urinating. Musculoskeletal: Negative for back pain and arthralgias. Skin: Negative for color change, rash and wound.    Neurological: Negative for dizziness, seizures, syncope, speech difficulty, light-headedness or headaches. Hematological: Does not bruise/bleed easily.    Psychiatric/Behavioral: Negative for suicidal ideas, hallucinations, behavioral problems, self-injury or agitation       Past Medical History:  Past Medical History:   Diagnosis Date    Abdominal adhesions     Adnexal cyst 7/30/2019    Left    Anemia     Asthma     Chronic pelvic pain in female 7/30/2019    Diabetes mellitus     Dyskinesia     bilateral    Essential hypertension     GERD (gastroesophageal reflux disease)     Hypertension     Menopause     Microhematuria     Rheumatoid arteritis ([facility] 2018    Stool color black        Past Surgical History:  Past Surgical History:   Procedure Laterality Date    COLONOSCOPY N/A 1/21/2019    COLONOSCOPY performed by Steffi Cranker, MD at [address] [place] [address] HX CHOLECYSTECTOMY  6/28/10    HX COLONOSCOPY      HX ENDOSCOPY      HX HERNIA REPAIR      HX HYSTERECTOMY  1989    Fibroids    HX KNEE REPLACEMENT Left     HX KNEE REPLACEMENT Right 06/2018    HX OOPHORECTOMY  12/2000    HX ORTHOPAEDIC Right     ankle- multiple surgeries    HX SMALL BOWEL RESECTION  12/2000    HX SMALL BOWEL RESECTION  02/21/2017    Dr. Aaron Medina         Family History:  Family History   Problem Relation Age of Onset    Hypertension Other         parent    Breast Cancer Other 21    Heart Disease Father     Diabetes Father     Lung Disease Father     Diabetes Mother     Hypertension Other         sibling    Diabetes Other         parent    Kidney Disease Brother     Diabetes Brother     Lung Disease Brother        Social History:  Social History     Socioeconomic History    Marital status:      Spouse name: Not on file    Number of children: Not on file    Years of education: Not on file    Highest education level: Not on file   Tobacco Use    Smoking status: Never Smoker    Smokeless tobacco: Never Used Substance and Sexual Activity    Alcohol use: No    Drug use: No    Sexual activity: Yes     Partners: Male     Birth control/protection: None       Home Medications:  Prior to Admission medications    Medication Sig Start Date End Date Taking? Authorizing Provider   dulaglutide (Trulicity) 3.79 SZ/4.9 mL sub-q pen 0.5 mL by SubCUTAneous route every seven (7) days. 1/27/21   Ani rAce MD   Insulin Needles, Disposable, (Nia Pen Needle) 32 gauge x 5/32\" ndle Give injection once a week 1/27/21   Ani Arce MD   doxepin (SINEquan) 50 mg capsule Take 1 Cap by mouth nightly. 1/27/21   Ani Arce MD   amLODIPine (NORVASC) 5 mg tablet Take 1 Tab by mouth daily. 1/27/21   Ani Arce MD   albuterol sulfate (ProAir RespiClick) 90 mcg/actuation breath activated inhaler Take 2 Puffs by inhalation every four (4) hours as needed for Wheezing, Shortness of Breath or Cough. 1/27/21   Ani Arce MD   albuterol (PROVENTIL VENTOLIN) 2.5 mg /3 mL (0.083 %) nebu 3 mL by Nebulization route every six (6) hours as needed for Wheezing. 1/27/21   Ani Arce MD   glucose blood VI test strips (OneTouch Ultra Test) strip Check blood glucose as directed 1/27/21   Ani Arce MD   bisoprolol-hydroCHLOROthiazide [facility]) 2.5-6.25 mg per tablet take 1 tablet by mouth once daily 1/27/21   Ani Arce MD   ondansetron (Zofran ODT) 4 mg disintegrating tablet Take 1-2 tablets every 6-8 hours as needed for nausea and vomiting. 1/19/21   Nga Brown DO   ondansetron (Zofran ODT) 4 mg disintegrating tablet Take 1 tablets every 6-8 hours as needed for nausea and vomiting. 11/24/20   Robert Owen NP   omeprazole (PRILOSEC) 10 mg capsule take 1 capsule by mouth once daily 9/29/20   Ani Arce MD   dapagliflozin (FARXIGA) 10 mg tab tablet Take 10 mg by mouth Every morning. Other, MD Yamel   multivitamin (ONE A DAY) tablet Take 1 Tab by mouth daily. Provider, Historical       Allergies:   Allergies Allergen Reactions    Macrodantin [Nitrofurantoin Macrocrystalline] Itching and Other (comments)    Tape [Adhesive] Other (comments)     Paper tape-- feels like burning skin  Burned skin when had wound vac           Physical Exam:     Visit Vitals  BP (!) 130/92   Pulse 89   Temp 98.6 °F (37 °C)   Resp 16   Ht 5' 4\" (1.626 m)   Wt 68 kg (150 lb)   SpO2 99%   BMI 25.75 kg/m²       Physical Exam:  General appearance: alert, cooperative, no distress, appears stated age  Head: Normocephalic, without obvious abnormality, atraumatic  Neck: supple, trachea midline  Lungs: clear to auscultation bilaterally  Heart: regular rate and rhythm, S1, S2 normal, no murmur, click, rub or gallop  Abdomen: soft, distended  Pain diffuse but worse in lower quadrants  Extremities: extremities normal, atraumatic, no cyanosis or edema  Skin: Skin color, texture, turgor normal. No rashes or lesions  Neurologic: Grossly normal    Intake and Output:  Current Shift:  01/28 1901 - 01/29 0700  In: 1000 [I.V.:1000]  Out: -   Last three shifts:  No intake/output data recorded.     Lab/Data Reviewed:  CMP:   Lab Results   Component Value Date/Time     01/28/2021 06:28 PM    K 4.5 01/28/2021 06:28 PM     01/28/2021 06:28 PM    CO2 27 01/28/2021 06:28 PM    AGAP 5 01/28/2021 06:28 PM     (H) 01/28/2021 06:28 PM    BUN 14 01/28/2021 06:28 PM    CREA 1.21 01/28/2021 06:28 PM    GFRAA 55 (L) 01/28/2021 06:28 PM    GFRNA 45 (L) 01/28/2021 06:28 PM    CA 9.3 01/28/2021 06:28 PM    ALB 3.5 01/28/2021 06:28 PM    TP 7.6 01/28/2021 06:28 PM    GLOB 4.1 (H) 01/28/2021 06:28 PM    AGRAT 0.9 01/28/2021 06:28 PM    ALT 29 01/28/2021 06:28 PM     CBC:   Lab Results   Component Value Date/Time    WBC 7.2 01/28/2021 07:05 PM    HGB 13.2 01/28/2021 07:05 PM    HCT 43.0 01/28/2021 07:05 PM     01/28/2021 07:05 PM     All Cardiac Markers in the last 24 hours: No results found for: CPK, CK, CKMMB, CKMB, RCK3, CKMBT, CKNDX, CKND1, JEANNE, TROPT, Randi Moulton, TNIPOC, BNP, BNPP    Cele Major MD    January 28, 2021

## 2021-01-29 NOTE — PROGRESS NOTES
conducted an initial consultation and Spiritual Assessment for Josh Wing, who is a 61 y.o.,female. Patients Primary Language is: [place]. According to the patients EMR Worship Affiliation is: [facility].     The reason the Patient came to the hospital is:   Patient Active Problem List    Diagnosis Date Noted    Abdominal pain 10/21/2020    Small bowel obstruction ([address] 09/11/2020    Anemia 08/10/2019    S/P small bowel resection 08/01/2019    Adnexal cyst 07/30/2019    Chronic pelvic pain in female 07/30/2019    Bowel obstruction ([address] 08/23/2018    Non-intractable cyclical vomiting with nausea 01/24/2018    Type 2 diabetes mellitus with nephropathy ([address] 01/19/2018    Chronic abdominal pain 03/14/2017    Post-operative pain 03/14/2017    SBO (small bowel obstruction) ([address] 02/21/2017    Osteoarthritis of left knee 06/27/2016    Hypertension 06/27/2016    Hyperlipidemia 06/27/2016    GERD (gastroesophageal reflux disease) 06/27/2016    Asthma 06/27/2016    Type 2 diabetes mellitus ([address] 06/27/2016    Sural neuritis 06/23/2014    Chest pain 04/20/2014    Essential hypertension, benign 02/27/2012    Generalized abdominal pain         The  provided the following Interventions:  Initiated a relationship of care and support. Explored issues of meeta, spirituality and/or Voodoo needs while hospitalized. Listened empathically. Provided chaplaincy education. Provided information about Spiritual Care Services. Offered prayer and assurance of continued prayers on patient's behalf. Chart reviewed. The following outcomes were achieved:  Patient shared some information about their medical narrative and spiritual journey/beliefs. Patient processed feeling about current hospitalization. Patient expressed gratitude for the 's visit.     Assessment:  Patient did not indicate any spiritual or Voodoo issues which require Spiritual Care Services interventions at this time. Patient does not have any Hoahaoism/cultural needs that will affect patients preferences in health care. Plan:  Chaplains will continue to follow and will provide pastoral care on an as needed or requested basis.  recommends bedside caregivers page  on duty if patient shows signs of acute spiritual or emotional distress.     [address] [facility]   Staff [address] [facility]   [phone]

## 2021-01-29 NOTE — PROGRESS NOTES
Bedside, Verbal and Written shift change report given by Stefanie Lomas RN (offgoing nurse). Report included the following information SBAR, Kardex, Intake/Output, MAR and Recent Results. 8922 Initial assessment completed see flowsheet. Patient resting in bed, call light in reach, no issues noted. 1001 Due meds given see MAR.   1054 Paged Dr Shell Escobar regarding order for prn pain med as discussed earlier on unit, telephone order with rb placed for 2mg morphine q 3 hr PRN. RBV  1101 Patient medicated for 10/10 abdominal RLQ pain see MAR.   5195 Reassessed patient pain she states she is much more comfortable now. Patient resting in bed, call light in reach, no issues noted. 1159 Patient resting in bed, call light in reach, no issues noted. 1348 Patient resting in bed, call light in reach, no issues noted. 1500 Patient resting in bed, call light in reach, no issues noted. 1810 Due meds given see MAR. Patient resting in bed, call light in reach, no issues noted. 1902 PIV to rt hand removed pt stated it was painful. Patient resting in bed, call light in reach, no issues noted. 1923 Bedside, Verbal and Written shift change report given to see Tracy (oncoming nurse) Report included the following information SBAR, Kardex, Intake/Output, MAR and Recent Results.

## 2021-01-29 NOTE — PROGRESS NOTES
TRANSFER - IN REPORT:    Verbal report received from Steve(name) on [facility]  being received from ED(unit) for routine progression of care      Report consisted of patients Situation, Background, Assessment and   Recommendations(SBAR). Information from the following report(s) SBAR, ED Summary, Intake/Output and Recent Results was reviewed with the receiving nurse. Opportunity for questions and clarification was provided. 2209- Pt brought from ED via stretcher alert and oriented x 4  Pt c/o pain   Assessment completed upon patients arrival to unit and care assumed. 18- Dr Lorena Bellamy called and said that he is ordering morphine for the pt for only once    2333- Pt's sister called phone transferred to room  [phone]- Pt given morphine for pain. 0045- Pt sleeping  0400- New iv started on the left forearm gauge 22 . IVF infusing well.  0600- Blood sugar 112mg/dl. Pt resting in bed. Pt continues to be NPO as ordered. 0710-Bedside and Verbal shift change report given KAMI Burleson (oncoming nurse) by Shasha Soto RN (offgoing nurse).  Report included the following information SBAR, Kardex, Intake/Output, MAR and Recent Results. '

## 2021-01-29 NOTE — CONSULTS
Consult    Patient: Gerald Montenegro MRN: [id number]  SSN: [id number]    YOB: 1960  Age: 61 y.o. Sex: female      Subjective:      Gerald Montenegro is a 61 y.o. female who is being seen for abdominal pain. Patient said that she started having severe right lower quadrant pelvic area pain yesterday. Patient had episodes of nausea no vomiting. Patient had multiple loose bowel movements yesterday. She had another bowel movement when she was transferred up to the floor from the ED. Patient denies any nausea at this time. However she still continues to have pain in the right lower quadrant area. Patient is very well-known to Dr. Kira Mckeon.   She has had multiple abdominal surgeries in the past.    Allergies   Allergen Reactions    Macrodantin [Nitrofurantoin Macrocrystalline] Itching and Other (comments)    Tape [Adhesive] Other (comments)     Paper tape-- feels like burning skin  Burned skin when had wound vac     Current Facility-Administered Medications   Medication Dose Route Frequency Provider Last Rate Last Admin    albuterol (PROVENTIL VENTOLIN) nebulizer solution 2.5 mg  2.5 mg Nebulization Q6H PRN Leidy Rock MD        dextrose 5 % - 0.45% NaCl infusion  50 mL/hr IntraVENous CONTINUOUS Leidy Rock MD 50 mL/hr at 01/28/21 2347 50 mL/hr at 01/28/21 2347    sodium chloride (NS) flush 5-40 mL  5-40 mL IntraVENous Q8H Leidy Rock MD   10 mL at 01/29/21 0600    sodium chloride (NS) flush 5-40 mL  5-40 mL IntraVENous PRN Leidy Rock MD        acetaminophen (TYLENOL) tablet 650 mg  650 mg Oral Q6H PRN Leidy Rock MD        Or    acetaminophen (TYLENOL) suppository 650 mg  650 mg Rectal Q6H PRN Leidy Rock MD        polyethylene glycol [facility]) packet 17 g  17 g Oral DAILY PRN Leidy Rock MD        promethazine (PHENERGAN) tablet 12.5 mg  12.5 mg Oral Q6H PRN Leidy Rock MD        Or    ondansetron [facility]) injection 4 mg  4 mg IntraVENous Q6H PRN Polly Gary MD        enoxaparin (LOVENOX) injection 40 mg  40 mg SubCUTAneous DAILY Polly Gary MD   40 mg at 01/29/21 1001    glucose chewable tablet 16 g  4 Tab Oral PRN Polly Gary MD        glucagon [facility]) injection 1 mg  1 mg IntraMUSCular PRN Polly Gary MD        dextrose (D50) infusion 12.5-25 g  25-50 mL IntraVENous PRN Polly Gary MD        insulin lispro (HUMALOG) injection   SubCUTAneous Q6H Polly Gary MD   Stopped at 01/29/21 0000    enalaprilat (VASOTEC) 2.5 mg in 0.9% sodium chloride 50 mL IVPB  2.5 mg IntraVENous Q6H PRN Polly Gary MD        metoprolol (LOPRESSOR) injection 5 mg  5 mg IntraVENous Q6H PRN Polly Gary MD        naloxone [facility]) injection 0.4 mg  0.4 mg IntraVENous PRN Polly Gary MD         Past Medical History:   Diagnosis Date    Abdominal adhesions     Adnexal cyst 7/30/2019    Left    Anemia     Asthma     Chronic pelvic pain in female 7/30/2019    Diabetes mellitus     Dyskinesia     bilateral    Essential hypertension     GERD (gastroesophageal reflux disease)     Hypertension     Menopause     Microhematuria     Rheumatoid arteritis ([address] 2018    Stool color black      Past Surgical History:   Procedure Laterality Date    COLONOSCOPY N/A 1/21/2019    [facility] ENDOSCOPY    HX CHOLECYSTECTOMY  6/28/10    HX COLONOSCOPY      HX ENDOSCOPY      HX HERNIA REPAIR      HX HYSTERECTOMY  1989    Fibroids    HX KNEE REPLACEMENT Left     HX KNEE REPLACEMENT Right 06/2018    HX OOPHORECTOMY  12/2000    HX ORTHOPAEDIC Right     ankle- multiple surgeries    HX SMALL BOWEL RESECTION  12/2000    HX SMALL BOWEL RESECTION  02/21/2017    Dr. Gilda Almaraz        Social History     Tobacco Use    Smoking status: Never Smoker    Smokeless tobacco: Never Used   Substance Use Topics    Alcohol use: No     Family History   Problem Relation Age of Onset   • Hypertension Other         parent   • Breast Cancer Other 20   • Heart Disease Father    • Diabetes Father    • Lung Disease Father    • Diabetes Mother    • Hypertension Other         sibling   • Diabetes Other         parent   • Kidney Disease Brother    • Diabetes Brother    • Lung Disease Brother            Review of Systems:    General: Denies fevers, chills, night sweats, fatigue, weight loss, or weight gain.    HEENT: Denies changes in auditory or visual acuity, recurrent pharyngitis, epistaxis, chronic rhinorrhea, vertigo    Respiratory: Denies increasing shortness of breath, productive cough, hemoptysis    Cardiac: Denies known history of cardiac disease, heart murmur, palpitations    GI: Denies dysphagia, recurrent emesis, hematemesis, changes in bowel habits, hematochezia, melena    : Denies hematuria frequency urgency dysuria    Musculoskeletal: Denies fractures, dislocations    Neurologic: Denies history of CVA, paralysis paresthesias, recurrent cephalgia, seizures    Endocrine: Denies polyuria, polydipsia, polyphagia, heat and cold intolerance    Lymph/heme: Denies a history of malignancy, anemia, bruising, blood transfusions    Integumentary: Negative for dermatitis     Objective:     Vitals:    01/28/21 2145 01/28/21 2210 01/29/21 0314 01/29/21 0752   BP: (!) 130/92  119/78 105/65   Pulse:  89 74 82   Resp:  16 18 18   Temp:  98.6 °F (37 °C) 97.8 °F (36.6 °C) 97.9 °F (36.6 °C)   SpO2: 98% 99% 98% 96%   Weight:       Height:            General: no acute distress, nontoxic in appearance.  Head: Normocephalic, atraumatic  Mouth: Clear, no overt lesions, oral mucosa is pink and moist.  Neck: Supple, no masses, no adenopathy or carotid bruits, trachea midline  Resp: Clear to auscultation bilaterally, no wheezing, rhonchi, or rales, excursions normal and symmetrical.  Cardio: Regular rate and rhythm, no murmurs, clicks, gallops, or rubs.   Abdomen: soft, tender to  palpation in the right lower quadrant, nondistended, normoactive bowel sounds, no hernias. Extremities: Warm, well perfused, no tenderness or swelling, normal gait/station, without edema or varicosities  Neuro: Sensation and strength grossly intact and symmetrical.  Psych: Alert and oriented to person, place, and time. Assessment:   Patient is a 51-year-old female with complex past surgical history. Patient is very well-known to Dr. Rai Olson for previous abdominal surgeries. Patient came in yesterday complaining of abdominal pain and nausea. Her labs and CAT scan were personally reviewed. White count 7.1 hemoglobin 10.3 platelets 295 CAT scan shows postoperative changes around small bowel anastomosis there is no signs of discrete small bowel obstruction. Patient is currently thirsty and hungry. She had another bowel movement in her room. Patient has been a frequent flyer of the ER for repeated lower abdominal pain. Her case was discussed with Dr. Rai Olson. Patient is known to have a frozen abdomen and surgery would only be considered as the very last resort, if she came in with an acute abdomen. She does not have any signs of a bowel obstruction at this time. Plan:     Can advance diet as tolerated. Patient says that the morphine helped with her lower abdominal pain yesterday. Patient has repeated episodes of this similar pain that brings her to the ER. The patient may benefit from pain management/pain clinic in the long run to address her chronic abdominal pains. She has no signs of a surgical abdomen. Please call with any further questions.      Signed By: Samanta Baez MD     January 29, 2021

## 2021-01-29 NOTE — ED NOTES
Report was called to [facility]. The patient was transported by cart to room 2220 without incident.

## 2021-01-29 NOTE — PROGRESS NOTES
Problem: Discharge Planning  Goal: *Discharge to safe environment  Outcome: Progressing Towards Goal   Plan is home    Reason for admission:     SBO         RUR Score/Risk Level:  Observation- has been in ER a couple times . Last admission was in early December. PCP: First and Last name:  Lissett Cabral   Name of Practice:    Are you a current patient: Yes/No: saw   Approximate date of last visit: saw in person on wednesday   Can you participate in a virtual visit with your PCP: yes    Is a Care Conference indicated:       Did you attend your follow up appointment (s): If not, why not:   Saw PCP a couple days ago         Resources/supports as identified by patient/family:          Top Challenges facing patient (as identified by patient/family and CM): Finances/Medication cost?    Has [employer] and [employer] system or lack thereof? Living arrangements? Lives with   Self-care/ADLs/Cognition?     independent       Current Advanced Directive/Advance Care Plan:  Says yes but not on chart           Plan for utilizing home health:   no             Transition of Care Plan:    Plan of Care  . The current Transition of Care Plan is:      Chart reviewed and pt verified demographic's. She is upset that she had to come back to hospital and is moaning in bed. Her plan is to go home when she is better.  Collette Landau- [phone] / [phone]    Care Management Interventions  PCP Verified by CM:  Yes  Mode of Transport at Discharge: ( will drive her home)  Transition of Care Consult (CM Consult): Discharge Planning  Current Support Network: Lives with Spouse  Confirm Follow Up Transport: Family  The Plan for Transition of Care is Related to the Following Treatment Goals : resolution of acute symptoms  Discharge Location  Discharge Placement: Home     Patient and/or next of kin has been given the Outpatient Observation Information and Notification letter and all questions answered. Jhonathan Correa.  Christen Linares, JLN  [facility]  [phone], Pager [phone]  [email or web address]

## 2021-01-30 LAB
GLUCOSE BLD STRIP.AUTO-MCNC: 107 MG/DL (ref 70–110)
GLUCOSE BLD STRIP.AUTO-MCNC: 107 MG/DL (ref 70–110)
GLUCOSE BLD STRIP.AUTO-MCNC: 116 MG/DL (ref 70–110)

## 2021-01-30 PROCEDURE — 96372 THER/PROPH/DIAG INJ SC/IM: CPT

## 2021-01-30 PROCEDURE — 96375 TX/PRO/DX INJ NEW DRUG ADDON: CPT

## 2021-01-30 PROCEDURE — 74011250636 HC RX REV CODE- 250/636: Performed by: INTERNAL MEDICINE

## 2021-01-30 PROCEDURE — 74011250636 HC RX REV CODE- 250/636: Performed by: HOSPITALIST

## 2021-01-30 PROCEDURE — 82962 GLUCOSE BLOOD TEST: CPT

## 2021-01-30 PROCEDURE — 99218 HC RM OBSERVATION: CPT

## 2021-01-30 PROCEDURE — 96376 TX/PRO/DX INJ SAME DRUG ADON: CPT

## 2021-01-30 RX ORDER — DIPHENHYDRAMINE HYDROCHLORIDE 50 MG/ML
25 INJECTION, SOLUTION INTRAMUSCULAR; INTRAVENOUS
Status: DISCONTINUED | OUTPATIENT
Start: 2021-01-30 | End: 2021-01-31 | Stop reason: HOSPADM

## 2021-01-30 RX ORDER — OXYCODONE AND ACETAMINOPHEN 5; 325 MG/1; MG/1
1 TABLET ORAL
Qty: 12 TAB | Refills: 0 | Status: SHIPPED | OUTPATIENT
Start: 2021-01-30 | End: 2021-02-02

## 2021-01-30 RX ADMIN — ONDANSETRON 4 MG: 2 INJECTION INTRAMUSCULAR; INTRAVENOUS at 13:04

## 2021-01-30 RX ADMIN — MORPHINE SULFATE 2 MG: 2 INJECTION, SOLUTION INTRAMUSCULAR; INTRAVENOUS at 13:04

## 2021-01-30 RX ADMIN — ENOXAPARIN SODIUM 40 MG: 40 INJECTION SUBCUTANEOUS at 08:49

## 2021-01-30 RX ADMIN — MORPHINE SULFATE 2 MG: 2 INJECTION, SOLUTION INTRAMUSCULAR; INTRAVENOUS at 03:34

## 2021-01-30 RX ADMIN — Medication 10 ML: at 06:46

## 2021-01-30 RX ADMIN — MORPHINE SULFATE 2 MG: 2 INJECTION, SOLUTION INTRAMUSCULAR; INTRAVENOUS at 23:48

## 2021-01-30 RX ADMIN — DIPHENHYDRAMINE HYDROCHLORIDE 25 MG: 50 INJECTION, SOLUTION INTRAMUSCULAR; INTRAVENOUS at 17:14

## 2021-01-30 RX ADMIN — DIPHENHYDRAMINE HYDROCHLORIDE 25 MG: 50 INJECTION, SOLUTION INTRAMUSCULAR; INTRAVENOUS at 01:18

## 2021-01-30 RX ADMIN — DIPHENHYDRAMINE HYDROCHLORIDE 25 MG: 50 INJECTION, SOLUTION INTRAMUSCULAR; INTRAVENOUS at 23:48

## 2021-01-30 RX ADMIN — MORPHINE SULFATE 2 MG: 2 INJECTION, SOLUTION INTRAMUSCULAR; INTRAVENOUS at 17:14

## 2021-01-30 RX ADMIN — Medication 10 ML: at 23:48

## 2021-01-30 RX ADMIN — MORPHINE SULFATE 2 MG: 2 INJECTION, SOLUTION INTRAMUSCULAR; INTRAVENOUS at 06:43

## 2021-01-30 RX ADMIN — MORPHINE SULFATE 2 MG: 2 INJECTION, SOLUTION INTRAMUSCULAR; INTRAVENOUS at 20:03

## 2021-01-30 RX ADMIN — Medication 10 ML: at 13:05

## 2021-01-30 RX ADMIN — MORPHINE SULFATE 2 MG: 2 INJECTION, SOLUTION INTRAMUSCULAR; INTRAVENOUS at 08:58

## 2021-01-30 RX ADMIN — DIPHENHYDRAMINE HYDROCHLORIDE 25 MG: 50 INJECTION, SOLUTION INTRAMUSCULAR; INTRAVENOUS at 08:49

## 2021-01-30 NOTE — PROGRESS NOTES
Problem: Discharge Planning  Goal: *Discharge to safe environment  Outcome: met/resolved    Pt dc'd home, no services ordered. Care Management Interventions  PCP Verified by CM:  Yes  Mode of Transport at Discharge: ( will drive her home)  Transition of Care Consult (CM Consult): Discharge Planning  Current Support Network: Lives with Spouse  Confirm Follow Up Transport: Family  The Plan for Transition of Care is Related to the Following Treatment Goals : resolution of acute symptoms  Discharge Location  Discharge Placement: Home

## 2021-01-30 NOTE — PROGRESS NOTES
Problem: Diabetes Self-Management  Goal: *Incorporating nutritional management into lifestyle  Description: Describe effect of type, amount and timing of food on blood glucose; list 3 methods for planning meals. Outcome: Progressing Towards Goal  Goal: *Incorporating physical activity into lifestyle  Description: State effect of exercise on blood glucose levels. Outcome: Progressing Towards Goal  Goal: *Developing strategies to promote health/change behavior  Description: Define the ABC's of diabetes; identify appropriate screenings, schedule and personal plan for screenings. Outcome: Progressing Towards Goal  Goal: *Using medications safely  Description: State effect of diabetes medications on diabetes; name diabetes medication taking, action and side effects. Outcome: Progressing Towards Goal  Goal: *Monitoring blood glucose, interpreting and using results  Description: Identify recommended blood glucose targets  and personal targets. Outcome: Progressing Towards Goal  Goal: *Prevention, detection, treatment of acute complications  Description: List symptoms of hyper- and hypoglycemia; describe how to treat low blood sugar and actions for lowering  high blood glucose level. Outcome: Progressing Towards Goal  Goal: *Prevention, detection and treatment of chronic complications  Description: Define the natural course of diabetes and describe the relationship of blood glucose levels to long term complications of diabetes. Outcome: Progressing Towards Goal  Goal: *Developing strategies to address psychosocial issues  Description: Describe feelings about living with diabetes; identify support needed and support network  Outcome: Progressing Towards Goal  Goal: *Patient Specific Goal (EDIT GOAL, INSERT TEXT)  Outcome: Progressing Towards Goal     Problem: Falls - Risk of  Goal: *Absence of Falls  Description: Document Kavya Fall Risk and appropriate interventions in the flowsheet.   Outcome: Progressing Towards Goal  Note: Fall Risk Interventions:       Medication Interventions: Evaluate medications/consider consulting pharmacy, Patient to call before getting OOB, Teach patient to arise slowly    Elimination Interventions: Call light in reach, Patient to call for help with toileting needs, Toilet paper/wipes in reach    Problem: Discharge Planning  Goal: *Discharge to safe environment  Outcome: Progressing Towards Goal

## 2021-01-30 NOTE — DISCHARGE SUMMARY
Discharge Summary       PATIENT ID: Sharee Correa  MRN: [id number]   YOB: 1960    DATE OF ADMISSION: 1/28/2021  6:02 PM    DATE OF DISCHARGE: 01/30/21    PRIMARY CARE PROVIDER: Kyree Ely MD     ATTENDING PHYSICIAN: Nico Jo MD  DISCHARGING PROVIDER: Nico Jo MD        CONSULTATIONS: IP CONSULT TO GENERAL SURGERY    PROCEDURES/SURGERIES: * No surgery found *    ADMITTING [address] [place] [address] COURSE:   Sharee Correa is a 61y.o. year old female who presents with  Nausea and abdominal pain since yesterday. She has not vomited. She has had surgery by Dr. Jonathan Hall in the past.  She has had SBO several times. Her abdominal pain is diffuse but worse in lower quadrants , she is mildly - moderately distended. She had a loose BM today. DISCHARGE DIAGNOSES / PLAN:      Assessment   Active Problems:    SBO (small bowel obstruction) ([address] (2/21/2017)              Plan      SBO- partial              - on prelim CT              - surgery consulted- consult reviewed, no surgery indicated              - start full liquids, advance as tolerated              - zofran as needed              - having N and abdominal pain, no vomiting  - prn morphine     DM              - SSI     DISPO  -Pt to be admitted  at this time for reasons addressed above, continued hospitalization for ongoing assessment and treatment indicated         Per surgery  Assessment:   Patient is a 70-year-old female with complex past surgical history. Patient is very well-known to Dr. Peng Warren for previous abdominal surgeries. Patient came in yesterday complaining of abdominal pain and nausea. Her labs and CAT scan were personally reviewed. White count 7.1 hemoglobin 10.3 platelets 197 CAT scan shows postoperative changes around small bowel anastomosis there is no signs of discrete small bowel obstruction. Patient is currently thirsty and hungry. She had another bowel movement in her room.   Patient has been a frequent flyer of the ER for repeated lower abdominal pain. Her case was discussed with Dr. Chaitanya Chinchilla. Patient is known to have a frozen abdomen and surgery would only be considered as the very last resort, if she came in with an acute abdomen. She does not have any signs of a bowel obstruction at this time.        Plan:      Can advance diet as tolerated. Patient says that the morphine helped with her lower abdominal pain yesterday. Patient has repeated episodes of this similar pain that brings her to the ER. The patient may benefit from pain management/pain clinic in the long run to address her chronic abdominal pains. She has no signs of a surgical abdomen. Please call with any further questions. Total dc time 35 mins  Pt is having flatus, and had BM. She is still having some abd pain. She agrees to see how she feels after eating lunch to decide if she is safe to go home. She is medically cleared if she feels well after lunch. Total dc time 35 mins    FOLLOW UP APPOINTMENTS:    Follow-up Information     Follow up With Specialties Details Why Contact Info    Stewart Houser MD Family Medicine, Internal Medicine   [address] [place] [address] [phone]               DIET: regular      DISCHARGE MEDICATIONS:  Current Discharge Medication List      START taking these medications    Details   oxyCODONE-acetaminophen (Percocet) 5-325 mg per tablet Take 1 Tab by mouth every six (6) hours as needed for Pain for up to 3 days. Max Daily Amount: 4 Tabs. Qty: 12 Tab, Refills: 0    Associated Diagnoses: SBO (small bowel obstruction) ([facility]         CONTINUE these medications which have NOT CHANGED    Details   dulaglutide (Trulicity) 5.99 UQ/2.8 mL sub-q pen 0.5 mL by SubCUTAneous route every seven (7) days.   Qty: 3 Pen, Refills: 1    Associated Diagnoses: Steroid-induced diabetes mellitus, sequela (HCC)      Insulin Needles, Disposable, (Nia Pen Needle) 32 gauge x 5/32\" ndle Give injection once a week  Qty: 30 Pen Needle, Refills: 2    Associated Diagnoses: Steroid-induced diabetes mellitus, sequela ([facility])      doxepin (SINEquan) 50 mg capsule Take 1 Cap by mouth nightly. Qty: 90 Cap, Refills: 3    Associated Diagnoses: Insomnia secondary to chronic pain      amLODIPine (NORVASC) 5 mg tablet Take 1 Tab by mouth daily. Qty: 90 Tab, Refills: 3    Comments: Discontinue previous order  Associated Diagnoses: Essential hypertension, benign      albuterol sulfate (ProAir RespiClick) 90 mcg/actuation breath activated inhaler Take 2 Puffs by inhalation every four (4) hours as needed for Wheezing, Shortness of Breath or Cough. Qty: 1 Inhaler, Refills: 5    Comments: Discontinue previous order      albuterol (PROVENTIL VENTOLIN) 2.5 mg /3 mL (0.083 %) nebu 3 mL by Nebulization route every six (6) hours as needed for Wheezing. Qty: 30 Nebule, Refills: 1    Comments: Discontinue previous order  Associated Diagnoses: Moderate persistent asthma without complication      glucose blood VI test strips (OneTouch Ultra Test) strip Check blood glucose as directed  Qty: 100 Strip, Refills: 1    Associated Diagnoses: Type 2 diabetes mellitus with hyperglycemia, without long-term current use of insulin ([facility])      bisoprolol-hydroCHLOROthiazide (ZIAC) 2.5-6.25 mg per tablet take 1 tablet by mouth once daily  Qty: 90 Tab, Refills: 3    Comments: Discontinue previous order  Associated Diagnoses: Essential hypertension      ondansetron (Zofran ODT) 4 mg disintegrating tablet Take 1 tablets every 6-8 hours as needed for nausea and vomiting. Qty: 30 Tab, Refills: 1      omeprazole (PRILOSEC) 10 mg capsule take 1 capsule by mouth once daily  Qty: 90 Cap, Refills: 4    Associated Diagnoses: Gastroesophageal reflux disease without esophagitis      dapagliflozin (FARXIGA) 10 mg tab tablet Take 10 mg by mouth Every morning.      multivitamin (ONE A DAY) tablet Take 1 Tab by mouth daily.                NOTIFY YOUR PHYSICIAN FOR ANY OF THE FOLLOWING:   Fever over 101 degrees for 24 hours. Chest pain, shortness of breath, fever, chills, nausea, vomiting, diarrhea, change in mentation, falling, weakness, bleeding. Severe pain or pain not relieved by medications. Or, any other signs or symptoms that you may have questions about. PHYSICAL EXAMINATION AT DISCHARGE:  General:          Alert, cooperative, no distress, appears stated age. HEENT:           Atraumatic, anicteric sclerae, pink conjunctivae                          No oral ulcers, mucosa moist, throat clear, dentition fair  Neck:               Supple, symmetrical  Lungs:             Clear to auscultation bilaterally. No Wheezing or Rhonchi. No rales. Chest wall:      No tenderness  No Accessory muscle use. Heart:              Regular  rhythm,  No  murmur   No edema  Abdomen:        Soft, some tender. Not distended. Bowel sounds normal  Extremities:     No cyanosis. No clubbing,                            Skin turgor normal, Capillary refill normal  Skin:                Not pale. Not Jaundiced  No rashes   Psych:             Not anxious or agitated.   Neurologic:      Alert, moves all extremities, answers questions appropriately and responds to commands       CHRONIC MEDICAL DIAGNOSES:  Problem List as of 1/30/2021 Date Reviewed: 10/7/2020          Codes Class Noted - Resolved    Abdominal pain ICD-10-CM: R10.9  ICD-9-CM: 789.00  10/21/2020 - Present        Small bowel obstruction ([facility] ICD-10-CM: C46.248  ICD-9-CM: 560.9  9/11/2020 - Present        Anemia ICD-10-CM: D64.9  ICD-9-CM: 285.9  8/10/2019 - Present        S/P small bowel resection ICD-10-CM: Z90.49  ICD-9-CM: V45.89  8/1/2019 - Present        Adnexal cyst ICD-10-CM: N94.9  ICD-9-CM: 625.8  7/30/2019 - Present    Overview Signed 7/30/2019  2:53 PM by Zahra Dillard MD     Left             Chronic pelvic pain in female ICD-10-CM: R10.2, G89.29  ICD-9-CM: 625.9, 338.29  7/30/2019 - Present Bowel obstruction ([place] [address].) ICD-10-CM: N79.705  ICD-9-CM: 560.9  8/23/2018 - Present        Non-intractable cyclical vomiting with nausea ICD-10-CM: R11.15  ICD-9-CM: 536.2  1/24/2018 - Present        Type 2 diabetes mellitus with nephropathy ([place] [address].) ICD-10-CM: E11.21  ICD-9-CM: 250.40, 583.81  1/19/2018 - Present        Chronic abdominal pain ICD-10-CM: R10.9, G89.29  ICD-9-CM: 789.00, 338.29  3/14/2017 - Present        Post-operative pain ICD-10-CM: G89.18  ICD-9-CM: 338.18  3/14/2017 - Present        * (Principal) SBO (small bowel obstruction) ([place] [address].) ICD-10-CM: V43.936  ICD-9-CM: 560.9  2/21/2017 - Present        Osteoarthritis of left knee ICD-10-CM: M17.12  ICD-9-CM: 715.96  6/27/2016 - Present        Hypertension (Chronic) ICD-10-CM: I10  ICD-9-CM: 401.9  6/27/2016 - Present        Hyperlipidemia (Chronic) ICD-10-CM: E78.5  ICD-9-CM: 272.4  6/27/2016 - Present        GERD (gastroesophageal reflux disease) (Chronic) ICD-10-CM: K21.9  ICD-9-CM: 530.81  6/27/2016 - Present        Asthma (Chronic) ICD-10-CM: J45.909  ICD-9-CM: 493.90  6/27/2016 - Present        Type 2 diabetes mellitus ([place] [address].) (Chronic) ICD-10-CM: E11.9  ICD-9-CM: 250.00  6/27/2016 - Present        Sural neuritis ICD-10-CM: G57.80  ICD-9-CM: 355.79  6/23/2014 - Present        Chest pain ICD-10-CM: R07.9  ICD-9-CM: 786.50  4/20/2014 - Present        Essential hypertension, benign ICD-10-CM: I10  ICD-9-CM: 401.1  2/27/2012 - Present        Generalized abdominal pain ICD-10-CM: R10.84  ICD-9-CM: 789.07  Unknown - Present        RESOLVED: Pelvic abscess in female ICD-10-CM: N73.9  ICD-9-CM: 614.4  3/2/2017 - 4/24/2017        RESOLVED: Abdominal pain, chronic, right lower quadrant ICD-10-CM: R10.31, G89.29  ICD-9-CM: 789.03, 338.29  8/13/2012 - 6/27/2016        RESOLVED: Follow-up examination following surgery ICD-10-CM: Z09  ICD-9-CM: V67.00  10/30/2010 - 6/27/2016              Greater than 35 minutes were spent with the patient on counseling and coordination of care    Signed:   Nilay Jefferson MD  1/30/2021  10:05 AM

## 2021-01-30 NOTE — PROGRESS NOTES
Pt c/o itching, order received for PRN Benadryl inj.    0411: Ice park administered to RLQ    Bedside shift change report given to Marc [facility] (oncoming nurse) by Odalys Brown RN (offgoing nurse). Report included the following information SBAR, Procedure Summary, Intake/Output, MAR and Recent Results.

## 2021-01-31 VITALS
DIASTOLIC BLOOD PRESSURE: 81 MMHG | WEIGHT: 150 LBS | SYSTOLIC BLOOD PRESSURE: 132 MMHG | HEIGHT: 64 IN | HEART RATE: 85 BPM | BODY MASS INDEX: 25.61 KG/M2 | OXYGEN SATURATION: 98 % | RESPIRATION RATE: 18 BRPM | TEMPERATURE: 98.2 F

## 2021-01-31 LAB
GLUCOSE BLD STRIP.AUTO-MCNC: 121 MG/DL (ref 70–110)
GLUCOSE BLD STRIP.AUTO-MCNC: 188 MG/DL (ref 70–110)

## 2021-01-31 PROCEDURE — 96376 TX/PRO/DX INJ SAME DRUG ADON: CPT

## 2021-01-31 PROCEDURE — 74011250636 HC RX REV CODE- 250/636: Performed by: HOSPITALIST

## 2021-01-31 PROCEDURE — 74011250636 HC RX REV CODE- 250/636: Performed by: INTERNAL MEDICINE

## 2021-01-31 PROCEDURE — 82962 GLUCOSE BLOOD TEST: CPT

## 2021-01-31 PROCEDURE — 74011636637 HC RX REV CODE- 636/637: Performed by: HOSPITALIST

## 2021-01-31 PROCEDURE — 99218 HC RM OBSERVATION: CPT

## 2021-01-31 PROCEDURE — 96372 THER/PROPH/DIAG INJ SC/IM: CPT

## 2021-01-31 RX ADMIN — MORPHINE SULFATE 2 MG: 2 INJECTION, SOLUTION INTRAMUSCULAR; INTRAVENOUS at 05:30

## 2021-01-31 RX ADMIN — DIPHENHYDRAMINE HYDROCHLORIDE 25 MG: 50 INJECTION, SOLUTION INTRAMUSCULAR; INTRAVENOUS at 08:18

## 2021-01-31 RX ADMIN — INSULIN LISPRO 2 UNITS: 100 INJECTION, SOLUTION INTRAVENOUS; SUBCUTANEOUS at 00:56

## 2021-01-31 RX ADMIN — MORPHINE SULFATE 2 MG: 2 INJECTION, SOLUTION INTRAMUSCULAR; INTRAVENOUS at 08:20

## 2021-01-31 RX ADMIN — Medication 10 ML: at 05:30

## 2021-01-31 RX ADMIN — ENOXAPARIN SODIUM 40 MG: 40 INJECTION SUBCUTANEOUS at 08:22

## 2021-01-31 NOTE — DISCHARGE INSTRUCTIONS
DISCHARGE SUMMARY from Nurse    PATIENT INSTRUCTIONS:    What to do at Home:  Recommended activity: Activity as tolerated,     If you experience any of the following symptoms chest pain,excessive nausea and vomiting, please follow up with PCP. *  Please give a list of your current medications to your Primary Care Provider. *  Please update this list whenever your medications are discontinued, doses are      changed, or new medications (including over-the-counter products) are added. *  Please carry medication information at all times in case of emergency situations. These are general instructions for a healthy lifestyle:    No smoking/ No tobacco products/ Avoid exposure to second hand smoke  Surgeon General's Warning:  Quitting smoking now greatly reduces serious risk to your health. Obesity, smoking, and sedentary lifestyle greatly increases your risk for illness    A healthy diet, regular physical exercise & weight monitoring are important for maintaining a healthy lifestyle    You may be retaining fluid if you have a history of heart failure or if you experience any of the following symptoms:  Weight gain of 3 pounds or more overnight or 5 pounds in a week, increased swelling in our hands or feet or shortness of breath while lying flat in bed. Please call your doctor as soon as you notice any of these symptoms; do not wait until your next office visit. The discharge information has been reviewed with the patient. The patient verbalized understanding. Discharge medications reviewed with the patient and appropriate educational materials and side effects teaching were provided. Patient armband removed and shredded.   ___________________________________________________________________________________________________________________________________  Patient Education        Bowel Blockage (Intestinal Obstruction): Care Instructions  Your Care Instructions  A bowel blockage, also called an intestinal obstruction, can prevent gas, fluids, or solids from moving through the intestines normally. It can cause constipation and, rarely, diarrhea. You may have pain, nausea, vomiting, and cramping. Most of the time, complete blockages require a stay in the hospital and possibly surgery. But if your bowel is only partly blocked, your doctor may tell you to wait until it clears on its own and you are able to pass gas and stool. If so, there are things you can do at home to help make you feel better. If you have had surgery for a bowel blockage, there are things you can do at home to make sure you heal well. You can also make some changes to keep your bowel from becoming blocked again. Follow-up care is a key part of your treatment and safety. Be sure to make and go to all appointments, and call your doctor if you are having problems. It's also a good idea to know your test results and keep a list of the medicines you take. How can you care for yourself at home? If your doctor has told you to wait at home for a blockage to clear on its own:  · Follow your doctor's instructions. These may include eating a liquid diet to avoid complete blockage. · Be safe with medicines. Take your medicines exactly as prescribed. Call your doctor if you think you are having a problem with your medicine. · Put a heating pad set on low on your belly to relieve mild cramps and pain. To prevent another blockage  · Try to eat smaller amounts of food more often. For example, have 5 or 6 small meals throughout the day instead of 2 or 3 large meals. · Chew your food very well. Try to chew each bite about 20 times or until it is liquid. · Avoid high-fiber foods and raw fruits and vegetables with skins, husks, strings, or seeds. These can form a ball of undigested material that can cause a blockage if a part of your bowel is scarred or narrowed.   · Check with your doctor before you eat whole-grain products or use a fiber supplement such as Citrucel or Metamucil. · To help you have regular bowel movements, eat at regular times, do not strain during a bowel movement, and drink at least 8 to 10 glasses of water each day. If you have kidney, heart, or liver disease and have to limit fluids, talk with your doctor or before you increase the amount of fluids you drink. · Drink high-calorie liquid formulas if your doctor says to. Severe symptoms may make it hard for your body to take in vitamins and minerals. · Get regular exercise. It helps you digest your food better. Get at least 30 minutes of physical activity on most days of the week. Walking is a good choice. When should you call for help? Call your doctor now or seek immediate medical care if:    · You have a fever.     · You are vomiting.     · You have new or worse belly pain.     · You cannot pass stools or gas. Watch closely for changes in your health, and be sure to contact your doctor if you have any problems. Where can you learn more? Go to http://www.gray.com/  Enter B082 in the search box to learn more about \"Bowel Blockage (Intestinal Obstruction): Care Instructions. \"  Current as of: April 15, 2020               Content Version: 12.6  © 6874-3189 [employer]. Care instructions adapted under license by [employer] (which disclaims liability or warranty for this information). If you have questions about a medical condition or this instruction, always ask your healthcare professional. [place] [address] any warranty or liability for your use of this information.

## 2021-01-31 NOTE — PROGRESS NOTES
Problem: Diabetes Self-Management  Goal: *Disease process and treatment process  Description: Define diabetes and identify own type of diabetes; list 3 options for treating diabetes. Outcome: Resolved/Met  Goal: *Incorporating nutritional management into lifestyle  Description: Describe effect of type, amount and timing of food on blood glucose; list 3 methods for planning meals. Outcome: Resolved/Met  Goal: *Incorporating physical activity into lifestyle  Description: State effect of exercise on blood glucose levels. Outcome: Resolved/Met  Goal: *Developing strategies to promote health/change behavior  Description: Define the ABC's of diabetes; identify appropriate screenings, schedule and personal plan for screenings. Outcome: Resolved/Met  Goal: *Using medications safely  Description: State effect of diabetes medications on diabetes; name diabetes medication taking, action and side effects. Outcome: Resolved/Met  Goal: *Monitoring blood glucose, interpreting and using results  Description: Identify recommended blood glucose targets  and personal targets. Outcome: Resolved/Met  Goal: *Prevention, detection, treatment of acute complications  Description: List symptoms of hyper- and hypoglycemia; describe how to treat low blood sugar and actions for lowering  high blood glucose level. Outcome: Resolved/Met  Goal: *Prevention, detection and treatment of chronic complications  Description: Define the natural course of diabetes and describe the relationship of blood glucose levels to long term complications of diabetes.   Outcome: Resolved/Met  Goal: *Developing strategies to address psychosocial issues  Description: Describe feelings about living with diabetes; identify support needed and support network  Outcome: Resolved/Met  Goal: *Insulin pump training  Outcome: Resolved/Met  Goal: *Sick day guidelines  Outcome: Resolved/Met  Goal: *Patient Specific Goal (EDIT GOAL, INSERT TEXT)  Outcome: Resolved/Met Problem: Patient Education: Go to Patient Education Activity  Goal: Patient/Family Education  Outcome: Resolved/Met     Problem: Falls - Risk of  Goal: *Absence of Falls  Description: Document Moo Dillon Fall Risk and appropriate interventions in the flowsheet.   Outcome: Resolved/Met  Note: Fall Risk Interventions:            Medication Interventions: Teach patient to arise slowly    Elimination Interventions: Call light in reach, Patient to call for help with toileting needs              Problem: Patient Education: Go to Patient Education Activity  Goal: Patient/Family Education  Outcome: Resolved/Met     Problem: Discharge Planning  Goal: *Discharge to safe environment  Outcome: Resolved/Met

## 2021-01-31 NOTE — ROUTINE PROCESS
0730-- Bedside, Verbal and Written shift change report received by David Morley (oncoming nurse) by She(offgoing nurse). Allergy band placed on pt's wrist. Report included the following information SBAR, Kardex, Intake/Output, MAR and Recent Results. 0822--Assessment completed, call bell within reach, no distress noted. AM  medications administered, pt tolerated with ease, will continue to monitor. 1200 -- Shift reassessment, pt condition unchanged, will continue to monitor. 1600 --  Shift reassessment, pt condition unchanged, will continue to monitor. 1930 -- Bedside, Verbal and Written shift change report given to Jovita(oncoming nurse) by David Morley (offgoing nurse). Report included the following information SBAR, Kardex, Intake/Output, MAR and Recent Results. Skin assessment completed.

## 2021-01-31 NOTE — ROUTINE PROCESS
0720  Bedside, Verbal and Written shift change report received from [place]. Report included the following information SBAR, Kardex, Intake/Output, MAR and Recent Results. -- AM medications administered, pt tolerated with ease, will continue to monitor. 1030 Patient currently resting in bed, alert and oriented to all spheres, denies pain or shortness of breath, call bell in reach, 5 Ps and hourly rounding completed, bed locked in low position. 1120 Patient for discharge today. Instructed to pick-up prescription at [facility] and to make follow-up appointment with PCP in a week. Reading materials regarding SBO given to patient. Advised also to avoid fiber containing food for now and to monitor daily bowel movement. Patient verbalized understanding.

## 2021-01-31 NOTE — PROGRESS NOTES
Bedside shift report received from [address] [place] [address]: AOX4, on room air, resting in bed; assessment done; no significant changes noted from previous shift's assessment    2003: Morphine 2 mg IV given as requested for abdominal pain-see flowsheet    2030: resting in bed; no distress noted    2130: sleeping; needs within reach    2230: sleeping    2348: prn Benadryl IV given for c/o itching all over; Morphine 2 mg given iv for c/o abdominal pain- see flowsheet    0015: sleeping; no distress noted    0200: sleeping; no distress noted    0530: prn Morphine given for c/o abdominal pain    0600: resting in bed; no visual indicators for pain noted    0730: Bedside and Verbal shift change report given to Tori Ashley RN (oncoming nurse) by Lady Gacria RN (offgoing nurse). Report included the following information SBAR, Kardex, Intake/Output and Recent Results.

## 2021-02-01 ENCOUNTER — PATIENT OUTREACH (OUTPATIENT)
Dept: CASE MANAGEMENT | Age: 61
End: 2021-02-01

## 2021-02-01 NOTE — PROGRESS NOTES
2/1/2021 12:10 PM     CTN attempted to contact patient for hospital follow up. Patient admitted to [address] [place] [address] on 1/28/21 and discharged on 1/31/21 for Small bowel obstruction. Message left introducing myself, the purpose of the call and giving my contact information. Requested that patient call back at her earliest convenience.

## 2021-02-02 ENCOUNTER — PATIENT OUTREACH (OUTPATIENT)
Dept: CASE MANAGEMENT | Age: 61
End: 2021-02-02

## 2021-02-02 NOTE — PROGRESS NOTES
2/2/2021 12:10 PM     CTN attempted to contact patient for hospital follow up. Patient admitted to [facility] on 1/28/21 and discharged on 1/31/21 for Small bowel obstruction. Message left at both numbers introducing myself, the purpose of the call and giving my contact information. Requested that patient call back at her earliest convenience. Unable to reach patient x 2 attempts. Episode closed.

## 2021-02-03 ENCOUNTER — APPOINTMENT (OUTPATIENT)
Dept: GENERAL RADIOLOGY | Age: 61
End: 2021-02-03
Attending: PHYSICIAN ASSISTANT
Payer: COMMERCIAL

## 2021-02-03 ENCOUNTER — HOSPITAL ENCOUNTER (EMERGENCY)
Age: 61
Discharge: HOME | End: 2021-02-03
Attending: STUDENT IN AN ORGANIZED HEALTH CARE EDUCATION/TRAINING PROGRAM
Payer: COMMERCIAL

## 2021-02-03 VITALS
BODY MASS INDEX: 25.61 KG/M2 | HEIGHT: 64 IN | TEMPERATURE: 98.5 F | SYSTOLIC BLOOD PRESSURE: 141 MMHG | DIASTOLIC BLOOD PRESSURE: 97 MMHG | WEIGHT: 150 LBS | HEART RATE: 92 BPM | OXYGEN SATURATION: 100 % | RESPIRATION RATE: 16 BRPM

## 2021-02-03 DIAGNOSIS — R10.33 RECURRENT PERIUMBILICAL ABDOMINAL PAIN: Primary | ICD-10-CM

## 2021-02-03 DIAGNOSIS — R11.2 NON-INTRACTABLE VOMITING WITH NAUSEA, UNSPECIFIED VOMITING TYPE: ICD-10-CM

## 2021-02-03 DIAGNOSIS — R14.0 ABDOMINAL DISTENSION: ICD-10-CM

## 2021-02-03 LAB
ALBUMIN SERPL-MCNC: 3.6 G/DL (ref 3.4–5)
ALBUMIN/GLOB SERPL: 0.9 {RATIO} (ref 0.8–1.7)
ALP SERPL-CCNC: 148 U/L (ref 45–117)
ALT SERPL-CCNC: 32 U/L (ref 13–56)
ANION GAP SERPL CALC-SCNC: 5 MMOL/L (ref 3–18)
AST SERPL-CCNC: 11 U/L (ref 10–38)
BASOPHILS # BLD: 0 K/UL (ref 0–0.1)
BASOPHILS NFR BLD: 0 % (ref 0–2)
BILIRUB SERPL-MCNC: 0.3 MG/DL (ref 0.2–1)
BUN SERPL-MCNC: 14 MG/DL (ref 7–18)
BUN/CREAT SERPL: 13 (ref 12–20)
CALCIUM SERPL-MCNC: 9.4 MG/DL (ref 8.5–10.1)
CHLORIDE SERPL-SCNC: 104 MMOL/L (ref 100–111)
CO2 SERPL-SCNC: 29 MMOL/L (ref 21–32)
CREAT SERPL-MCNC: 1.09 MG/DL (ref 0.6–1.3)
DIFFERENTIAL METHOD BLD: ABNORMAL
EOSINOPHIL # BLD: 0.2 K/UL (ref 0–0.4)
EOSINOPHIL NFR BLD: 3 % (ref 0–5)
ERYTHROCYTE [DISTWIDTH] IN BLOOD BY AUTOMATED COUNT: 14.7 % (ref 11.6–14.5)
GLOBULIN SER CALC-MCNC: 3.9 G/DL (ref 2–4)
GLUCOSE SERPL-MCNC: 114 MG/DL (ref 74–99)
HCT VFR BLD AUTO: 42.8 % (ref 35–45)
HGB BLD-MCNC: 13 G/DL (ref 12–16)
LIPASE SERPL-CCNC: 129 U/L (ref 73–393)
LYMPHOCYTES # BLD: 1.9 K/UL (ref 0.9–3.6)
LYMPHOCYTES NFR BLD: 35 % (ref 21–52)
MCH RBC QN AUTO: 25.5 PG (ref 24–34)
MCHC RBC AUTO-ENTMCNC: 30.4 G/DL (ref 31–37)
MCV RBC AUTO: 84.1 FL (ref 74–97)
MONOCYTES # BLD: 0.4 K/UL (ref 0.05–1.2)
MONOCYTES NFR BLD: 7 % (ref 3–10)
NEUTS SEG # BLD: 2.9 K/UL (ref 1.8–8)
NEUTS SEG NFR BLD: 55 % (ref 40–73)
PLATELET # BLD AUTO: 349 K/UL (ref 135–420)
PMV BLD AUTO: 10.4 FL (ref 9.2–11.8)
POTASSIUM SERPL-SCNC: 3.7 MMOL/L (ref 3.5–5.5)
PROT SERPL-MCNC: 7.5 G/DL (ref 6.4–8.2)
RBC # BLD AUTO: 5.09 M/UL (ref 4.2–5.3)
SODIUM SERPL-SCNC: 138 MMOL/L (ref 136–145)
WBC # BLD AUTO: 5.3 K/UL (ref 4.6–13.2)

## 2021-02-03 PROCEDURE — 96361 HYDRATE IV INFUSION ADD-ON: CPT

## 2021-02-03 PROCEDURE — 83690 ASSAY OF LIPASE: CPT

## 2021-02-03 PROCEDURE — 80053 COMPREHEN METABOLIC PANEL: CPT

## 2021-02-03 PROCEDURE — 74011250636 HC RX REV CODE- 250/636: Performed by: EMERGENCY MEDICINE

## 2021-02-03 PROCEDURE — 96375 TX/PRO/DX INJ NEW DRUG ADDON: CPT

## 2021-02-03 PROCEDURE — 85025 COMPLETE CBC W/AUTO DIFF WBC: CPT

## 2021-02-03 PROCEDURE — 99283 EMERGENCY DEPT VISIT LOW MDM: CPT

## 2021-02-03 PROCEDURE — 74011250636 HC RX REV CODE- 250/636: Performed by: PHYSICIAN ASSISTANT

## 2021-02-03 PROCEDURE — 96374 THER/PROPH/DIAG INJ IV PUSH: CPT

## 2021-02-03 RX ORDER — PROMETHAZINE HYDROCHLORIDE 25 MG/1
25-50 SUPPOSITORY RECTAL
Qty: 24 SUPPOSITORY | Refills: 0 | Status: SHIPPED | OUTPATIENT
Start: 2021-02-03 | End: 2021-03-16 | Stop reason: SDUPTHER

## 2021-02-03 RX ORDER — ONDANSETRON 4 MG/1
TABLET, ORALLY DISINTEGRATING ORAL
Qty: 30 TAB | Refills: 1 | Status: SHIPPED | OUTPATIENT
Start: 2021-02-03 | End: 2021-03-16 | Stop reason: SDUPTHER

## 2021-02-03 RX ORDER — HYDROMORPHONE HYDROCHLORIDE 1 MG/ML
0.5 INJECTION, SOLUTION INTRAMUSCULAR; INTRAVENOUS; SUBCUTANEOUS
Status: COMPLETED | OUTPATIENT
Start: 2021-02-03 | End: 2021-02-03

## 2021-02-03 RX ORDER — ONDANSETRON 2 MG/ML
4 INJECTION INTRAMUSCULAR; INTRAVENOUS ONCE
Status: COMPLETED | OUTPATIENT
Start: 2021-02-03 | End: 2021-02-03

## 2021-02-03 RX ORDER — METOCLOPRAMIDE HYDROCHLORIDE 5 MG/ML
5 INJECTION INTRAMUSCULAR; INTRAVENOUS
Status: COMPLETED | OUTPATIENT
Start: 2021-02-03 | End: 2021-02-03

## 2021-02-03 RX ORDER — DIPHENHYDRAMINE HYDROCHLORIDE 50 MG/ML
12.5 INJECTION, SOLUTION INTRAMUSCULAR; INTRAVENOUS
Status: COMPLETED | OUTPATIENT
Start: 2021-02-03 | End: 2021-02-03

## 2021-02-03 RX ADMIN — ONDANSETRON 4 MG: 2 INJECTION INTRAMUSCULAR; INTRAVENOUS at 20:02

## 2021-02-03 RX ADMIN — SODIUM CHLORIDE 1000 ML: 9 INJECTION, SOLUTION INTRAVENOUS at 20:02

## 2021-02-03 RX ADMIN — DIPHENHYDRAMINE HYDROCHLORIDE 12.5 MG: 50 INJECTION, SOLUTION INTRAMUSCULAR; INTRAVENOUS at 20:22

## 2021-02-03 RX ADMIN — METOCLOPRAMIDE 5 MG: 5 INJECTION, SOLUTION INTRAMUSCULAR; INTRAVENOUS at 20:22

## 2021-02-03 RX ADMIN — HYDROMORPHONE HYDROCHLORIDE 0.5 MG: 1 INJECTION, SOLUTION INTRAMUSCULAR; INTRAVENOUS; SUBCUTANEOUS at 20:22

## 2021-02-03 RX ADMIN — SODIUM CHLORIDE 1000 ML: 9 INJECTION, SOLUTION INTRAVENOUS at 20:29

## 2021-02-04 ENCOUNTER — PATIENT OUTREACH (OUTPATIENT)
Dept: CASE MANAGEMENT | Age: 61
End: 2021-02-04

## 2021-02-04 NOTE — PROGRESS NOTES
Patient contacted regarding recent discharge and COVID-19 risk. Discussed COVID-19 related testing which was not done at this time. Test results were not done. Patient informed of results, if available? N/a      Care Transition Nurse/ Ambulatory Care Manager contacted the patient by telephone to perform post discharge assessment. Verified name and  with patient as identifiers. Advance Care Planning:   Does patient have an Advance Directive: currently not on file; education provided     Patient has following risk factors of: asthma and diabetes. CTN/ACM reviewed discharge instructions, medical action plan and red flags related to discharge diagnosis. Reviewed and educated them on any new and changed medications related to discharge diagnosis. Advised obtaining a 90-day supply of all daily and as-needed medications. Education provided regarding infection prevention, and signs and symptoms of COVID-19 and when to seek medical attention with patient who verbalized understanding. Discussed exposure protocols and quarantine from [address] you at higher risk for severe illness  and given an opportunity for questions and concerns. The patient agrees to contact the COVID-19 hotline [phone] or PCP office for questions related to their healthcare. CTN/ACM provided contact information for future reference. From CDC: Are you at higher risk for severe illness?  Wash your hands often.  Avoid close contact (6 feet, which is about two arm lengths) with people who are sick.  Put distance between yourself and other people if COVID-19 is spreading in your community.  Clean and disinfect frequently touched surfaces.  Avoid all cruise travel and non-essential air travel.  Call your healthcare professional if you have concerns about COVID-19 and your underlying condition or if you are sick.     For more information on steps you can take to protect yourself, see CDC's How to Protect Yourself Patient/family/caregiver given information for [employer] and agrees to enroll yes  Patient's preferred e-mail:  [email or web address]  Pt's phone number: [phone]    Based on Loop alert triggers, patient will be contacted by nurse care manager for worsening symptoms. Pt will be further monitored by COVID Loop Team based on severity of symptoms and risk factors.

## 2021-02-04 NOTE — ED PROVIDER NOTES
EMERGENCY DEPARTMENT HISTORY AND PHYSICAL EXAM    Date: 2/3/2021  Patient Name: Keya Hanson    History of Presenting Illness     Chief Complaint:   Chief Complaint   Patient presents with    Abdominal Pain    Diarrhea    Vomiting     History Provided By: Patient    Additional History (Context): Keya Hanson is a 61 y.o. female with hx of SBO, abdominal adhesions (frozen abdomen), complex past surgical hx (last surgery by Dr. Rachel Britton), DM, HTN, RA, asthma, anemia, GERD, chronic pelvic pain, ambulatory to ED c/o abdominal pain, diarrhea, vomiting and abd distension since this morning. Passed gas last night but not today. She was discharged from hospital 1/31/2021, was admitted for similar symptoms and dx with partial SBO that did not require surgery     ABD-PELV CT 1/28/2021:  1. Postop changes from prior small bowel anastomosis with mild fluid distention of several loops of bowel throughout the abdomen and pelvis.     > No discrete point of transition is identified to suggest a small bowel obstruction definitively.     > Enteritis/ileus, less likely partial obstruction are differential considerations given appearance. 2. Additional, chronic/ancillary findings as above. PCP: Brent Harden MD   gen surgeon Dr. Caridad Reese Facility-Administered Medications   Medication Dose Route Frequency Provider Last Rate Last Admin    sodium chloride 0.9 % bolus infusion 1,000 mL  1,000 mL IntraVENous ONCE Kelly Sky MD 1,000 mL/hr at 02/03/21 2029 1,000 mL at 02/03/21 2029     Current Outpatient Medications   Medication Sig Dispense Refill    ondansetron (Zofran ODT) 4 mg disintegrating tablet Take 1 tablets every 6-8 hours as needed for nausea and vomiting. 30 Tab 1    promethazine (PHENERGAN) 25 mg suppository Insert 1-2 Suppositories into rectum every six (6) hours as needed for Nausea.  24 Suppository 0    dulaglutide (Trulicity) 4.78 SO/9.6 mL sub-q pen 0.5 mL by SubCUTAneous route every seven (7) days. 3 Pen 1    Insulin Needles, Disposable, (Nia Pen Needle) 32 gauge x 5/32\" ndle Give injection once a week 30 Pen Needle 2    doxepin (SINEquan) 50 mg capsule Take 1 Cap by mouth nightly. 90 Cap 3    amLODIPine (NORVASC) 5 mg tablet Take 1 Tab by mouth daily. 90 Tab 3    albuterol sulfate (ProAir RespiClick) 90 mcg/actuation breath activated inhaler Take 2 Puffs by inhalation every four (4) hours as needed for Wheezing, Shortness of Breath or Cough. 1 Inhaler 5    albuterol (PROVENTIL VENTOLIN) 2.5 mg /3 mL (0.083 %) nebu 3 mL by Nebulization route every six (6) hours as needed for Wheezing. 30 Nebule 1    glucose blood VI test strips (OneTouch Ultra Test) strip Check blood glucose as directed 100 Strip 1    bisoprolol-hydroCHLOROthiazide (ZIAC) 2.5-6.25 mg per tablet take 1 tablet by mouth once daily 90 Tab 3    omeprazole (PRILOSEC) 10 mg capsule take 1 capsule by mouth once daily 90 Cap 4    dapagliflozin (FARXIGA) 10 mg tab tablet Take 10 mg by mouth Every morning.  multivitamin (ONE A DAY) tablet Take 1 Tab by mouth daily.          Past History     Past Medical History:  Past Medical History:   Diagnosis Date    Abdominal adhesions     Adnexal cyst 7/30/2019    Left    Anemia     Asthma     Chronic pelvic pain in female 7/30/2019    Diabetes mellitus     Dyskinesia     bilateral    Essential hypertension     GERD (gastroesophageal reflux disease)     Hypertension     Menopause     Microhematuria     Rheumatoid arteritis ([facility] [address] 2018    Stool color black        Past Surgical History:  Past Surgical History:   Procedure Laterality Date    COLONOSCOPY N/A 1/21/2019    COLON[facility] ENDOSCOPY    HX CHOLECYSTECTOMY  6/28/10    HX COLONOSCOPY      HX ENDOSCOPY      HX HERNIA REPAIR      HX HYSTERECTOMY  1989    Fibroids    HX KNEE REPLACEMENT Left     HX KNEE REPLACEMENT Right 06/2018    HX OOPHORECTOMY  12/2000    HX ORTHOPAEDIC Right     ankle- multiple surgeries    HX SMALL BOWEL RESECTION  12/2000    HX SMALL BOWEL RESECTION  02/21/2017    Dr. Glen Su         Family History:  Family History   Problem Relation Age of Onset    Hypertension Other         parent    Breast Cancer Other 21    Heart Disease Father     Diabetes Father     Lung Disease Father     Diabetes Mother     Hypertension Other         sibling    Diabetes Other         parent    Kidney Disease Brother     Diabetes Brother     Lung Disease Brother        Social History:  Social History     Tobacco Use    Smoking status: Never Smoker    Smokeless tobacco: Never Used   Substance Use Topics    Alcohol use: No    Drug use: No       Allergies: Allergies   Allergen Reactions    Macrodantin [Nitrofurantoin Macrocrystalline] Itching and Other (comments)    Tape [Adhesive] Other (comments)     Paper tape-- feels like burning skin  Burned skin when had wound vac         Review of Systems   Review of Systems   Constitutional: Negative for activity change, appetite change, chills and fever. Eyes: Negative. Respiratory: Negative. Cardiovascular: Negative. Gastrointestinal: Positive for abdominal pain, diarrhea, nausea and vomiting. Negative for blood in stool and constipation. Genitourinary: Negative for dysuria, frequency and urgency. Musculoskeletal: Negative. Neurological: Negative for dizziness, syncope and headaches. All other systems reviewed and are negative. All Other Systems Negative  Physical Exam     Vitals:    02/03/21 1906   BP: (!) 141/97   Pulse: 92   Resp: 16   Temp: 98.5 °F (36.9 °C)   SpO2: 100%   Weight: 68 kg (150 lb)   Height: 5' 4\" (1.626 m)     Physical Exam  Vitals signs and nursing note reviewed. Constitutional:       General: She is not in acute distress. Appearance: She is well-developed. She is not toxic-appearing or diaphoretic.    HENT:      Head: Normocephalic and atraumatic. Nose: Nose normal.      Mouth/Throat:      Pharynx: Uvula midline. Eyes:      General: No scleral icterus. Pupils: Pupils are equal, round, and reactive to light. Neck:      Musculoskeletal: Normal range of motion and neck supple. Cardiovascular:      Rate and Rhythm: Normal rate and regular rhythm. Heart sounds: Normal heart sounds. Pulmonary:      Effort: Pulmonary effort is normal.      Breath sounds: Normal breath sounds. Abdominal:      General: A surgical scar is present. Bowel sounds are normal. There is distension. Palpations: Abdomen is soft. Tenderness: There is generalized abdominal tenderness. There is guarding. There is no right CVA tenderness, left CVA tenderness or rebound. Negative signs include Feldman's sign, Rovsing's sign and McBurney's sign. Hernia: No hernia is present. Musculoskeletal: Normal range of motion. Lymphadenopathy:      Cervical: No cervical adenopathy. Skin:     General: Skin is warm and dry. Capillary Refill: Capillary refill takes less than 2 seconds. Coloration: Skin is not jaundiced. Findings: No rash. Neurological:      Mental Status: She is alert and oriented to person, place, and time. Diagnostic Study Results     Labs -     Recent Results (from the past 12 hour(s))   CBC WITH AUTOMATED DIFF    Collection Time: 02/03/21  7:15 PM   Result Value Ref Range    WBC 5.3 4.6 - 13.2 K/uL    RBC 5.09 4. 20 - 5.30 M/uL    HGB 13.0 12.0 - 16.0 g/dL    HCT 42.8 35.0 - 45.0 %    MCV 84.1 74.0 - 97.0 FL    MCH 25.5 24.0 - 34.0 PG    MCHC 30.4 (L) 31.0 - 37.0 g/dL    RDW 14.7 (H) 11.6 - 14.5 %    PLATELET 220 685 - 969 K/uL    MPV 10.4 9.2 - 11.8 FL    NEUTROPHILS 55 40 - 73 %    LYMPHOCYTES 35 21 - 52 %    MONOCYTES 7 3 - 10 %    EOSINOPHILS 3 0 - 5 %    BASOPHILS 0 0 - 2 %    ABS. NEUTROPHILS 2.9 1.8 - 8.0 K/UL    ABS. LYMPHOCYTES 1.9 0.9 - 3.6 K/UL    ABS. MONOCYTES 0.4 0.05 - 1.2 K/UL    ABS.  EOSINOPHILS 0.2 0.0 - 0.4 K/UL    ABS. BASOPHILS 0.0 0.0 - 0.1 K/UL    DF AUTOMATED     METABOLIC PANEL, COMPREHENSIVE    Collection Time: 02/03/21  7:15 PM   Result Value Ref Range    Sodium 138 136 - 145 mmol/L    Potassium 3.7 3.5 - 5.5 mmol/L    Chloride 104 100 - 111 mmol/L    CO2 29 21 - 32 mmol/L    Anion gap 5 3.0 - 18 mmol/L    Glucose 114 (H) 74 - 99 mg/dL    BUN 14 7.0 - 18 MG/DL    Creatinine 1.09 0.6 - 1.3 MG/DL    BUN/Creatinine ratio 13 12 - 20      GFR est AA >60 >60 ml/min/1.73m2    GFR est non-AA 51 (L) >60 ml/min/1.73m2    Calcium 9.4 8.5 - 10.1 MG/DL    Bilirubin, total 0.3 0.2 - 1.0 MG/DL    ALT (SGPT) 32 13 - 56 U/L    AST (SGOT) 11 10 - 38 U/L    Alk. phosphatase 148 (H) 45 - 117 U/L    Protein, total 7.5 6.4 - 8.2 g/dL    Albumin 3.6 3.4 - 5.0 g/dL    Globulin 3.9 2.0 - 4.0 g/dL    A-G Ratio 0.9 0.8 - 1.7     LIPASE    Collection Time: 02/03/21  7:15 PM   Result Value Ref Range    Lipase 129 73 - 393 U/L       Radiologic Studies -   No orders to display     CT Results  (Last 48 hours)    None            Medical Decision Making   I am the first provider for this patient. I reviewed the vital signs, available nursing notes, past medical history, past surgical history, family history and social history. Vital Signs-Reviewed the patient's vital signs. Records Reviewed: Nursing Notes, Old Medical Records, Previous Radiology Studies and Previous Laboratory Studies    Procedures:  Procedures    Provider Notes (Medical Decision Making):       MED RECONCILIATION:  Current Facility-Administered Medications   Medication Dose Route Frequency    sodium chloride 0.9 % bolus infusion 1,000 mL  1,000 mL IntraVENous ONCE     Current Outpatient Medications   Medication Sig    ondansetron (Zofran ODT) 4 mg disintegrating tablet Take 1 tablets every 6-8 hours as needed for nausea and vomiting.     promethazine (PHENERGAN) 25 mg suppository Insert 1-2 Suppositories into rectum every six (6) hours as needed for Nausea.  dulaglutide (Trulicity) 7.58 MW/9.2 mL sub-q pen 0.5 mL by SubCUTAneous route every seven (7) days.  Insulin Needles, Disposable, (Nia Pen Needle) 32 gauge x 5/32\" ndle Give injection once a week    doxepin (SINEquan) 50 mg capsule Take 1 Cap by mouth nightly.  amLODIPine (NORVASC) 5 mg tablet Take 1 Tab by mouth daily.  albuterol sulfate (ProAir RespiClick) 90 mcg/actuation breath activated inhaler Take 2 Puffs by inhalation every four (4) hours as needed for Wheezing, Shortness of Breath or Cough.  albuterol (PROVENTIL VENTOLIN) 2.5 mg /3 mL (0.083 %) nebu 3 mL by Nebulization route every six (6) hours as needed for Wheezing.  glucose blood VI test strips (OneTouch Ultra Test) strip Check blood glucose as directed    bisoprolol-hydroCHLOROthiazide (ZIAC) 2.5-6.25 mg per tablet take 1 tablet by mouth once daily    omeprazole (PRILOSEC) 10 mg capsule take 1 capsule by mouth once daily    dapagliflozin (FARXIGA) 10 mg tab tablet Take 10 mg by mouth Every morning.  multivitamin (ONE A DAY) tablet Take 1 Tab by mouth daily. Disposition:  home    DISCHARGE NOTE:   Patient with multiple prior abdominal surgeries and review of surgical notes state patient would be only candidate for surgical intervention should she have an acute surgical abdomen which she does not at this time. Patient has been imaged multiple times which usually showed just nonspecific bowel dilatation. Patient likely gets increased distention from increased scar tissue and adhesions. I discussed with the patient that she needs to stick with clear liquids anytime she starts to get bloated or get nauseated to give her intestine some rest.  Do not feel she requires admission or further evaluation in the emergency department    Pt has been reexamined. Patient has no new complaints, changes, or physical findings. Care plan outlined and precautions discussed. Results of labs were reviewed with the patient. All medications were reviewed with the patient; will d/c home. All of pt's questions and concerns were addressed. Patient was instructed and agrees to follow up with pcp and dr Miri Capellan, as well as to return to the ED upon further deterioration. Patient is ready to go home. Follow-up Information     Follow up With Specialties Details Why Contact Info    Patricia Friedman MD Family Medicine, Internal Medicine Schedule an appointment as soon as possible for a visit   [id number] [facility]  [address] [facility] [address] [facility]      Eunice Herrera MD Surgery Schedule an appointment as soon as possible for a visit   [address] [address]  [facility] [address] [id number]  [phone]      [facility] EMERGENCY DEPT Emergency Medicine  If symptoms worsen [address]  [phone]          Current Discharge Medication List      START taking these medications    Details   promethazine (PHENERGAN) 25 mg suppository Insert 1-2 Suppositories into rectum every six (6) hours as needed for Nausea. Qty: 24 Suppository, Refills: 0         CONTINUE these medications which have CHANGED    Details   ondansetron (Zofran ODT) 4 mg disintegrating tablet Take 1 tablets every 6-8 hours as needed for nausea and vomiting. Qty: 30 Tab, Refills: 1                 Diagnosis     Clinical Impression:   1. Recurrent periumbilical abdominal pain    2. Abdominal distension    3. Non-intractable vomiting with nausea, unspecified vomiting type          Dictation disclaimer:  Please note that this dictation was completed with [id number], the computer voice recognition software. Quite often unanticipated grammatical, syntax, homophones, and other interpretive errors are inadvertently transcribed by the computer software. Please disregard these errors. Please excuse any errors that have escaped final proofreading.

## 2021-02-08 ENCOUNTER — APPOINTMENT (OUTPATIENT)
Dept: CT IMAGING | Age: 61
End: 2021-02-08
Attending: EMERGENCY MEDICINE
Payer: COMMERCIAL

## 2021-02-08 ENCOUNTER — HOSPITAL ENCOUNTER (EMERGENCY)
Age: 61
Discharge: HOME | End: 2021-02-08
Attending: EMERGENCY MEDICINE
Payer: COMMERCIAL

## 2021-02-08 VITALS
HEART RATE: 85 BPM | RESPIRATION RATE: 20 BRPM | HEIGHT: 64 IN | TEMPERATURE: 98.8 F | SYSTOLIC BLOOD PRESSURE: 115 MMHG | OXYGEN SATURATION: 98 % | DIASTOLIC BLOOD PRESSURE: 71 MMHG | WEIGHT: 143 LBS | BODY MASS INDEX: 24.41 KG/M2

## 2021-02-08 DIAGNOSIS — R10.31 ABDOMINAL PAIN, RIGHT LOWER QUADRANT: Primary | ICD-10-CM

## 2021-02-08 LAB
ALBUMIN SERPL-MCNC: 3.5 G/DL (ref 3.4–5)
ALBUMIN/GLOB SERPL: 0.9 {RATIO} (ref 0.8–1.7)
ALP SERPL-CCNC: 131 U/L (ref 45–117)
ALT SERPL-CCNC: 30 U/L (ref 13–56)
ANION GAP SERPL CALC-SCNC: 5 MMOL/L (ref 3–18)
APPEARANCE UR: CLEAR
AST SERPL-CCNC: 16 U/L (ref 10–38)
BASOPHILS # BLD: 0 K/UL (ref 0–0.1)
BASOPHILS NFR BLD: 1 % (ref 0–2)
BILIRUB SERPL-MCNC: 0.3 MG/DL (ref 0.2–1)
BILIRUB UR QL: NEGATIVE
BUN SERPL-MCNC: 15 MG/DL (ref 7–18)
BUN/CREAT SERPL: 14 (ref 12–20)
CALCIUM SERPL-MCNC: 9.5 MG/DL (ref 8.5–10.1)
CHLORIDE SERPL-SCNC: 104 MMOL/L (ref 100–111)
CO2 SERPL-SCNC: 28 MMOL/L (ref 21–32)
COLOR UR: YELLOW
CREAT SERPL-MCNC: 1.08 MG/DL (ref 0.6–1.3)
DIFFERENTIAL METHOD BLD: ABNORMAL
EOSINOPHIL # BLD: 0.2 K/UL (ref 0–0.4)
EOSINOPHIL NFR BLD: 3 % (ref 0–5)
ERYTHROCYTE [DISTWIDTH] IN BLOOD BY AUTOMATED COUNT: 15 % (ref 11.6–14.5)
FLUAV AG NPH QL IA: NEGATIVE
FLUBV AG NOSE QL IA: NEGATIVE
GLOBULIN SER CALC-MCNC: 3.7 G/DL (ref 2–4)
GLUCOSE SERPL-MCNC: 94 MG/DL (ref 74–99)
GLUCOSE UR STRIP.AUTO-MCNC: NEGATIVE MG/DL
HCT VFR BLD AUTO: 41.9 % (ref 35–45)
HGB BLD-MCNC: 12.6 G/DL (ref 12–16)
HGB UR QL STRIP: NEGATIVE
KETONES UR QL STRIP.AUTO: ABNORMAL MG/DL
LEUKOCYTE ESTERASE UR QL STRIP.AUTO: NEGATIVE
LIPASE SERPL-CCNC: 144 U/L (ref 73–393)
LYMPHOCYTES # BLD: 2.1 K/UL (ref 0.9–3.6)
LYMPHOCYTES NFR BLD: 40 % (ref 21–52)
MAGNESIUM SERPL-MCNC: 2.1 MG/DL (ref 1.6–2.6)
MCH RBC QN AUTO: 25.4 PG (ref 24–34)
MCHC RBC AUTO-ENTMCNC: 30.1 G/DL (ref 31–37)
MCV RBC AUTO: 84.3 FL (ref 74–97)
MONOCYTES # BLD: 0.7 K/UL (ref 0.05–1.2)
MONOCYTES NFR BLD: 14 % (ref 3–10)
NEUTS SEG # BLD: 2.3 K/UL (ref 1.8–8)
NEUTS SEG NFR BLD: 42 % (ref 40–73)
NITRITE UR QL STRIP.AUTO: NEGATIVE
PH UR STRIP: 5 [PH] (ref 5–8)
PLATELET # BLD AUTO: 330 K/UL (ref 135–420)
PMV BLD AUTO: 10.6 FL (ref 9.2–11.8)
POTASSIUM SERPL-SCNC: 3.9 MMOL/L (ref 3.5–5.5)
PROT SERPL-MCNC: 7.2 G/DL (ref 6.4–8.2)
PROT UR STRIP-MCNC: NEGATIVE MG/DL
RBC # BLD AUTO: 4.97 M/UL (ref 4.2–5.3)
SARS-COV-2, COV2: NORMAL
SODIUM SERPL-SCNC: 137 MMOL/L (ref 136–145)
SP GR UR REFRACTOMETRY: 1.02 (ref 1–1.03)
UROBILINOGEN UR QL STRIP.AUTO: 0.2 EU/DL (ref 0.2–1)
WBC # BLD AUTO: 5.3 K/UL (ref 4.6–13.2)

## 2021-02-08 PROCEDURE — 96374 THER/PROPH/DIAG INJ IV PUSH: CPT

## 2021-02-08 PROCEDURE — U0003 INFECTIOUS AGENT DETECTION BY NUCLEIC ACID (DNA OR RNA); SEVERE ACUTE RESPIRATORY SYNDROME CORONAVIRUS 2 (SARS-COV-2) (CORONAVIRUS DISEASE [COVID-19]), AMPLIFIED PROBE TECHNIQUE, MAKING USE OF HIGH THROUGHPUT TECHNOLOGIES AS DESCRIBED BY CMS-2020-01-R: HCPCS

## 2021-02-08 PROCEDURE — 74011250636 HC RX REV CODE- 250/636: Performed by: EMERGENCY MEDICINE

## 2021-02-08 PROCEDURE — 99284 EMERGENCY DEPT VISIT MOD MDM: CPT

## 2021-02-08 PROCEDURE — 74177 CT ABD & PELVIS W/CONTRAST: CPT

## 2021-02-08 PROCEDURE — 74011000636 HC RX REV CODE- 636: Performed by: EMERGENCY MEDICINE

## 2021-02-08 PROCEDURE — 80053 COMPREHEN METABOLIC PANEL: CPT

## 2021-02-08 PROCEDURE — 96376 TX/PRO/DX INJ SAME DRUG ADON: CPT

## 2021-02-08 PROCEDURE — 83735 ASSAY OF MAGNESIUM: CPT

## 2021-02-08 PROCEDURE — 81003 URINALYSIS AUTO W/O SCOPE: CPT

## 2021-02-08 PROCEDURE — 87804 INFLUENZA ASSAY W/OPTIC: CPT

## 2021-02-08 PROCEDURE — 74011250637 HC RX REV CODE- 250/637: Performed by: EMERGENCY MEDICINE

## 2021-02-08 PROCEDURE — 83690 ASSAY OF LIPASE: CPT

## 2021-02-08 PROCEDURE — 85025 COMPLETE CBC W/AUTO DIFF WBC: CPT

## 2021-02-08 RX ORDER — MORPHINE SULFATE 4 MG/ML
4 INJECTION, SOLUTION INTRAMUSCULAR; INTRAVENOUS ONCE
Status: COMPLETED | OUTPATIENT
Start: 2021-02-08 | End: 2021-02-08

## 2021-02-08 RX ORDER — DAPAGLIFLOZIN 10 MG/1
TABLET, FILM COATED ORAL
Qty: 90 TAB | Refills: 3 | Status: SHIPPED | OUTPATIENT
Start: 2021-02-08 | End: 2021-09-20 | Stop reason: SDUPTHER

## 2021-02-08 RX ORDER — MORPHINE SULFATE 4 MG/ML
4 INJECTION, SOLUTION INTRAMUSCULAR; INTRAVENOUS
Status: COMPLETED | OUTPATIENT
Start: 2021-02-08 | End: 2021-02-08

## 2021-02-08 RX ORDER — CYCLOBENZAPRINE HCL 10 MG
10 TABLET ORAL
Status: COMPLETED | OUTPATIENT
Start: 2021-02-08 | End: 2021-02-08

## 2021-02-08 RX ADMIN — IOPAMIDOL 95 ML: 612 INJECTION, SOLUTION INTRAVENOUS at 19:30

## 2021-02-08 RX ADMIN — MORPHINE SULFATE 4 MG: 4 INJECTION, SOLUTION INTRAMUSCULAR; INTRAVENOUS at 16:53

## 2021-02-08 RX ADMIN — CYCLOBENZAPRINE HYDROCHLORIDE 10 MG: 10 TABLET, FILM COATED ORAL at 20:25

## 2021-02-08 RX ADMIN — MORPHINE SULFATE 4 MG: 4 INJECTION, SOLUTION INTRAMUSCULAR; INTRAVENOUS at 18:41

## 2021-02-08 RX ADMIN — SODIUM CHLORIDE 1000 ML: 9 INJECTION, SOLUTION INTRAVENOUS at 18:42

## 2021-02-08 NOTE — ED TRIAGE NOTES
Pt arrives with c/o abdominal pain rlq, dysuria, headache, sob, chills. Pt states normal bm this am. Pt speaking in full sentences.
No

## 2021-02-09 ENCOUNTER — PATIENT OUTREACH (OUTPATIENT)
Dept: CASE MANAGEMENT | Age: 61
End: 2021-02-09

## 2021-02-09 LAB — SARS-COV-2, COV2NT: NOT DETECTED

## 2021-02-09 NOTE — PROGRESS NOTES
Date/Time:  2/9/2021 9:49 AM   Call within 2 business days of discharge: Yes   Attempted to reach patient by telephone. Left HIPPA compliant message requesting a return call. Will attempt to reach patient again. Covid test pending.

## 2021-02-09 NOTE — ED PROVIDER NOTES
EMERGENCY DEPARTMENT HISTORY AND PHYSICAL EXAM    8:07 PM      Date: 2/8/2021  Patient Name: Lissa Rich    History of Presenting Illness     Chief Complaint   Patient presents with    Abdominal Pain    Urinary Pain         History Provided By: patient    Additional History (Context): Lissa Rich is a 64 y.o. female presents with pain started yesterday lower abdomen waxing and waning severe at times. Small bowel movement passing gas yesterday but not much. No vomiting. Jannet Addison PCP: Abel Briggs MD    Chief Complaint:   Duration:    Timing:    Location:   Quality:   Severity:   Modifying Factors:   Associated Symptoms:       Current Facility-Administered Medications   Medication Dose Route Frequency Provider Last Rate Last Admin    cyclobenzaprine (FLEXERIL) tablet 10 mg  10 mg Oral NOW Prashanth Salvador MD         Current Outpatient Medications   Medication Sig Dispense Refill    Farxiga 10 mg tab tablet take 1 tablet by mouth once daily 90 Tab 3    ondansetron (Zofran ODT) 4 mg disintegrating tablet Take 1 tablets every 6-8 hours as needed for nausea and vomiting. 30 Tab 1    promethazine (PHENERGAN) 25 mg suppository Insert 1-2 Suppositories into rectum every six (6) hours as needed for Nausea. 24 Suppository 0    dulaglutide (Trulicity) 7.45 XB/2.0 mL sub-q pen 0.5 mL by SubCUTAneous route every seven (7) days. 3 Pen 1    Insulin Needles, Disposable, (Nia Pen Needle) 32 gauge x 5/32\" ndle Give injection once a week 30 Pen Needle 2    doxepin (SINEquan) 50 mg capsule Take 1 Cap by mouth nightly. 90 Cap 3    amLODIPine (NORVASC) 5 mg tablet Take 1 Tab by mouth daily. 90 Tab 3    albuterol sulfate (ProAir RespiClick) 90 mcg/actuation breath activated inhaler Take 2 Puffs by inhalation every four (4) hours as needed for Wheezing, Shortness of Breath or Cough.  1 Inhaler 5    albuterol (PROVENTIL VENTOLIN) 2.5 mg /3 mL (0.083 %) nebu 3 mL by Nebulization route every six (6) hours as needed for Wheezing. 30 Nebule 1    glucose blood VI test strips (OneTouch Ultra Test) strip Check blood glucose as directed 100 Strip 1    bisoprolol-hydroCHLOROthiazide (ZIAC) 2.5-6.25 mg per tablet take 1 tablet by mouth once daily 90 Tab 3    omeprazole (PRILOSEC) 10 mg capsule take 1 capsule by mouth once daily 90 Cap 4    multivitamin (ONE A DAY) tablet Take 1 Tab by mouth daily.          Past History     Past Medical History:  Past Medical History:   Diagnosis Date    Abdominal adhesions     Adnexal cyst 7/30/2019    Left    Anemia     Asthma     Chronic pelvic pain in female 7/30/2019    Diabetes mellitus     Dyskinesia     bilateral    Essential hypertension     GERD (gastroesophageal reflux disease)     Hypertension     Menopause     Microhematuria     Rheumatoid arteritis ([facility] [address] 2018    Stool color black        Past Surgical History:  Past Surgical History:   Procedure Laterality Date    COLONOSCOPY N/A 1/21/2019    COLONOSCOPY performed by [facility]ne Garvin [facility] at [facility] ENDOSCOPY    HX CHOLECYSTECTOMY  6/28/10    HX COLONOSCOPY      HX ENDOSCOPY      HX HERNIA REPAIR      HX HYSTERECTOMY  1989    Fibroids    HX KNEE REPLACEMENT Left     HX KNEE REPLACEMENT Right 06/2018    HX OOPHORECTOMY  12/2000    HX ORTHOPAEDIC Right     ankle- multiple surgeries    HX SMALL BOWEL RESECTION  12/2000    HX SMALL BOWEL RESECTION  02/21/2017    Dr. Taiwo Montoya         Family History:  Family History   Problem Relation Age of Onset    Hypertension Other         parent    Breast Cancer Other 21    Heart Disease Father     Diabetes Father     Lung Disease Father     Diabetes Mother     Hypertension Other         sibling    Diabetes Other         parent    Kidney Disease Brother     Diabetes Brother     Lung Disease Brother        Social History:  Social History     Tobacco Use    Smoking status: Never Smoker    Smokeless tobacco: Never Used   Substance Use Topics    Alcohol use: No    Drug use: No       Allergies: Allergies   Allergen Reactions    Macrodantin [Nitrofurantoin Macrocrystalline] Itching and Other (comments)    Tape [Adhesive] Other (comments)     Paper tape-- feels like burning skin  Burned skin when had wound vac         Review of Systems     Review of Systems   Constitutional: Negative for diaphoresis and fever. HENT: Negative for congestion and sore throat. Eyes: Negative for pain and itching. Respiratory: Negative for cough and shortness of breath. Cardiovascular: Negative for chest pain and palpitations. Gastrointestinal: Positive for abdominal pain. Negative for diarrhea. Endocrine: Negative for polydipsia and polyuria. Genitourinary: Negative for dysuria and hematuria. Musculoskeletal: Negative for arthralgias and myalgias. Skin: Negative for rash and wound. Neurological: Negative for seizures and syncope. Hematological: Does not bruise/bleed easily. Psychiatric/Behavioral: Negative for agitation and hallucinations. Physical Exam       Patient Vitals for the past 12 hrs:   Temp Pulse Resp BP SpO2   02/08/21 1800    126/73 100 %   02/08/21 1730    116/75 100 %   02/08/21 1700    127/79 99 %   02/08/21 1613 98.8 °F (37.1 °C) 85 20 133/69 99 %       Physical Exam  Vitals signs and nursing note reviewed. Constitutional:       Appearance: She is well-developed. HENT:      Head: Normocephalic and atraumatic. Eyes:      General: No scleral icterus. Conjunctiva/sclera: Conjunctivae normal.   Neck:      Musculoskeletal: Normal range of motion and neck supple. Vascular: No JVD. Cardiovascular:      Rate and Rhythm: Normal rate and regular rhythm. Heart sounds: Normal heart sounds. Comments: 4 intact extremity pulses  Pulmonary:      Effort: Pulmonary effort is normal.      Breath sounds: Normal breath sounds. Abdominal:      Palpations: Abdomen is soft. There is no mass. Tenderness: There is abdominal tenderness (Right lower quadrant and lower abdominal pain. No mass or hernia). Musculoskeletal: Normal range of motion. Lymphadenopathy:      Cervical: No cervical adenopathy. Skin:     General: Skin is warm and dry. Neurological:      Mental Status: She is alert. Diagnostic Study Results   Labs -  Recent Results (from the past 12 hour(s))   URINALYSIS W/ RFLX MICROSCOPIC    Collection Time: 02/08/21  4:30 PM   Result Value Ref Range    Color YELLOW      Appearance CLEAR      Specific gravity 1.020 1.005 - 1.030      pH (UA) 5.0 5.0 - 8.0      Protein Negative NEG mg/dL    Glucose Negative NEG mg/dL    Ketone TRACE (A) NEG mg/dL    Bilirubin Negative NEG      Blood Negative NEG      Urobilinogen 0.2 0.2 - 1.0 EU/dL    Nitrites Negative NEG      Leukocyte Esterase Negative NEG     CBC WITH AUTOMATED DIFF    Collection Time: 02/08/21  4:41 PM   Result Value Ref Range    WBC 5.3 4.6 - 13.2 K/uL    RBC 4.97 4.20 - 5.30 M/uL    HGB 12.6 12.0 - 16.0 g/dL    HCT 41.9 35.0 - 45.0 %    MCV 84.3 74.0 - 97.0 FL    MCH 25.4 24.0 - 34.0 PG    MCHC 30.1 (L) 31.0 - 37.0 g/dL    RDW 15.0 (H) 11.6 - 14.5 %    PLATELET 116 567 - 422 K/uL    MPV 10.6 9.2 - 11.8 FL    NEUTROPHILS 42 40 - 73 %    LYMPHOCYTES 40 21 - 52 %    MONOCYTES 14 (H) 3 - 10 %    EOSINOPHILS 3 0 - 5 %    BASOPHILS 1 0 - 2 %    ABS. NEUTROPHILS 2.3 1.8 - 8.0 K/UL    ABS. LYMPHOCYTES 2.1 0.9 - 3.6 K/UL    ABS. MONOCYTES 0.7 0.05 - 1.2 K/UL    ABS. EOSINOPHILS 0.2 0.0 - 0.4 K/UL    ABS.  BASOPHILS 0.0 0.0 - 0.1 K/UL    DF AUTOMATED     METABOLIC PANEL, COMPREHENSIVE    Collection Time: 02/08/21  4:41 PM   Result Value Ref Range    Sodium 137 136 - 145 mmol/L    Potassium 3.9 3.5 - 5.5 mmol/L    Chloride 104 100 - 111 mmol/L    CO2 28 21 - 32 mmol/L    Anion gap 5 3.0 - 18 mmol/L    Glucose 94 74 - 99 mg/dL    BUN 15 7.0 - 18 MG/DL    Creatinine 1.08 0.6 - 1.3 MG/DL    BUN/Creatinine ratio 14 12 - 20      GFR est AA >60 >60 ml/min/1.73m2    GFR est non-AA 52 (L) >60 ml/min/1.73m2    Calcium 9.5 8.5 - 10.1 MG/DL    Bilirubin, total 0.3 0.2 - 1.0 MG/DL    ALT (SGPT) 30 13 - 56 U/L    AST (SGOT) 16 10 - 38 U/L    Alk. phosphatase 131 (H) 45 - 117 U/L    Protein, total 7.2 6.4 - 8.2 g/dL    Albumin 3.5 3.4 - 5.0 g/dL    Globulin 3.7 2.0 - 4.0 g/dL    A-G Ratio 0.9 0.8 - 1.7     LIPASE    Collection Time: 02/08/21  4:41 PM   Result Value Ref Range    Lipase 144 73 - 393 U/L   MAGNESIUM    Collection Time: 02/08/21  4:41 PM   Result Value Ref Range    Magnesium 2.1 1.6 - 2.6 mg/dL   SARS-COV-2    Collection Time: 02/08/21  4:45 PM   Result Value Ref Range    SARS-CoV-2 Please find results under separate order     INFLUENZA A & B AG (RAPID TEST)    Collection Time: 02/08/21  4:46 PM   Result Value Ref Range    Influenza A Antigen Negative NEG      Influenza B Antigen Negative NEG         Radiologic Studies -   CT ABD PELV W CONT   Final Result      Mildly progressed inflammatory changes in the ventral midline abdomen about a   small bowel surgical anastomosis. No obstruction or fluid collection. _______________                          Veronica Kendra W Cont    Result Date: 2/8/2021  EXAM: CT of the abdomen and pelvis with intravenous contrast. INDICATION:  \"Right lower quadrant pain not passing gas possible recurrent bowel obstruction. \" COMPARISON:  CT January 28, 2021. DOSE REDUCTION AND DICOM INFORMATION: One or more dose reduction techniques were used on this CT: automated exposure control, adjustment of the mAs and/or kVp according to patient size, and iterative reconstruction techniques. The specific techniques used on this CT exam have been documented in the patient's electronic medical record.   Digital Imaging and Communications in Medicine (DICOM) format image data are available to nonaffiliated external healthcare facilities or entities on a secure, media free, reciprocally searchable basis with patient authorization for at least a 12-month period after this study. _______________ FINDINGS:      CHEST BASE: Unremarkable. LIVER: Unremarkable. GALLBLADDER: Cholecystectomy. Nathalia  BILE DUCTS: Unremarkable. PANCREAS: Unremarkable. SPLEEN: Unremarkable. GASTROINTESTINAL TRACT AND PERITONEAL CAVITY: There is nonspecific increased inflammatory stranding in the ventral midline abdomen with short segment small bowel wall thickening at a small bowel surgical anastomosis on example axial image 132 and ventral matting of small bowel indicative of small bowel adhesions. Lower esophagus:  Unremarkable. Hiatal hernia:  No.           Diverticulosis:  Low-grade. Ascites:  No.           Bowel obstruction:  No.           Pneumoperitoneum:  No.           Appendix/region:  Unremarkable. ADRENALS:           Right:  Unremarkable. Left:  Unremarkable. KIDNEYS, URETERS, BLADDER:           Calculi:  None detected. Right:  Unremarkable. Left:  Unremarkable. Bladder:  Unremarkable. VASCULATURE: Unremarkable. LYMPH NODES: Unremarkable. REPRODUCTIVE ORGANS: Hysterectomy. 4 cm left adnexal cyst.      SUPERFICIAL SOFT TISSUES: Chronic ventral midline laparotomy scar. BONES:  Low-grade lumbar disc bulging. _______________     Mildly progressed inflammatory changes in the ventral midline abdomen about a small bowel surgical anastomosis. No obstruction or fluid collection.  _______________       Medications ordered:   Medications   cyclobenzaprine (FLEXERIL) tablet 10 mg (has no administration in time range)   morphine injection 4 mg (4 mg IntraVENous Given 2/8/21 1653)   morphine injection 4 mg (4 mg IntraVENous Given 2/8/21 1841)   sodium chloride 0.9 % bolus infusion 1,000 mL (1,000 mL IntraVENous New Bag 2/8/21 1842)   iopamidoL (ISOVUE 300) 61 % contrast injection 100 mL (95 mL IntraVENous Given 2/8/21 1930)         Medical Decision Making Initial Medical Decision Making and DDx:  No alarming findings on CT scan, doubt incarcerated hernia bowel obstruction perforation. No alarming findings of diverticulitis. Her pain is better after some dose of medication. She describes the pain is very sharp almost instantaneously coming on, I wonder if this is irritated skeletal muscle from adhesions or something, we will give her a dose of Flexeril and see if that eases things off. She will follow up with her surgeon    ED Course: Progress Notes, Reevaluation, and Consults:  ED Course as of Feb 08 2007 Mon Feb 08, 2021   1813 Significant pain not passing any gas concerned may be missing a small bowel obstruction so we will get another CT despite having had several in the past.  She has had small bowel obstructions previously. High risk. [CB]      ED Course User Index  [CB] Yvonne Samson MD         I am the first provider for this patient. I reviewed the vital signs, available nursing notes, past medical history, past surgical history, family history and social history. Patient Vitals for the past 12 hrs:   Temp Pulse Resp BP SpO2   02/08/21 1800    126/73 100 %   02/08/21 1730    116/75 100 %   02/08/21 1700    127/79 99 %   02/08/21 1613 98.8 °F (37.1 °C) 85 20 133/69 99 %       Vital Signs-Reviewed the patient's vital signs. Pulse Oximetry Analysis, Cardiac Monitor, 12 lead ekg: No hypoxia on room air  Interpreted by the EP. Records Reviewed: Nursing notes reviewed (Time of Review: 8:07 PM)    Procedures:   Critical Care Time:   Aspirin: (was aspirin given for stroke?)    Diagnosis     Clinical Impression:   1.  Abdominal pain, right lower quadrant        Disposition: Discharged      Follow-up Information     Follow up With Specialties Details Why Contact Info    Romina Gamez MD Surgery Call in 1 day  [address] [address]  [phone]             Patient's Medications   Start Taking No medications on file   Continue Taking    ALBUTEROL (PROVENTIL VENTOLIN) 2.5 MG /3 ML (0.083 %) NEBU    3 mL by Nebulization route every six (6) hours as needed for Wheezing. ALBUTEROL SULFATE (PROAIR RESPICLICK) 90 MCG/ACTUATION BREATH ACTIVATED INHALER    Take 2 Puffs by inhalation every four (4) hours as needed for Wheezing, Shortness of Breath or Cough. AMLODIPINE (NORVASC) 5 MG TABLET    Take 1 Tab by mouth daily. BISOPROLOL-HYDROCHLOROTHIAZIDE (ZIAC) 2.5-6.25 MG PER TABLET    take 1 tablet by mouth once daily    DOXEPIN (SINEQUAN) 50 MG CAPSULE    Take 1 Cap by mouth nightly. DULAGLUTIDE (TRULICITY) 9.59 RR/5.8 ML SUB-Q PEN    0.5 mL by SubCUTAneous route every seven (7) days. FARXIGA 10 MG TAB TABLET    take 1 tablet by mouth once daily    GLUCOSE BLOOD VI TEST STRIPS (ONETOUCH ULTRA TEST) STRIP    Check blood glucose as directed    INSULIN NEEDLES, DISPOSABLE, (JESSA PEN NEEDLE) 32 GAUGE X 5/32\" NDLE    Give injection once a week    MULTIVITAMIN (ONE A DAY) TABLET    Take 1 Tab by mouth daily. OMEPRAZOLE (PRILOSEC) 10 MG CAPSULE    take 1 capsule by mouth once daily    ONDANSETRON (ZOFRAN ODT) 4 MG DISINTEGRATING TABLET    Take 1 tablets every 6-8 hours as needed for nausea and vomiting. PROMETHAZINE (PHENERGAN) 25 MG SUPPOSITORY    Insert 1-2 Suppositories into rectum every six (6) hours as needed for Nausea.    These Medications have changed    No medications on file   Stop Taking    No medications on file     _______________________________    Notes:    David Rutledge MD using Dragon dictation      _______________________________

## 2021-02-10 ENCOUNTER — HOSPITAL ENCOUNTER (EMERGENCY)
Age: 61
Discharge: HOME | End: 2021-02-10
Attending: EMERGENCY MEDICINE
Payer: COMMERCIAL

## 2021-02-10 ENCOUNTER — APPOINTMENT (OUTPATIENT)
Dept: GENERAL RADIOLOGY | Age: 61
End: 2021-02-10
Attending: PHYSICIAN ASSISTANT
Payer: COMMERCIAL

## 2021-02-10 ENCOUNTER — PATIENT OUTREACH (OUTPATIENT)
Dept: CASE MANAGEMENT | Age: 61
End: 2021-02-10

## 2021-02-10 VITALS
RESPIRATION RATE: 18 BRPM | TEMPERATURE: 98.3 F | SYSTOLIC BLOOD PRESSURE: 145 MMHG | HEART RATE: 99 BPM | DIASTOLIC BLOOD PRESSURE: 71 MMHG | OXYGEN SATURATION: 100 %

## 2021-02-10 DIAGNOSIS — R10.84 ABDOMINAL PAIN, GENERALIZED: Primary | ICD-10-CM

## 2021-02-10 LAB
ALBUMIN SERPL-MCNC: 3.5 G/DL (ref 3.4–5)
ALBUMIN/GLOB SERPL: 0.9 {RATIO} (ref 0.8–1.7)
ALP SERPL-CCNC: 142 U/L (ref 45–117)
ALT SERPL-CCNC: 27 U/L (ref 13–56)
ANION GAP SERPL CALC-SCNC: 4 MMOL/L (ref 3–18)
APPEARANCE UR: CLEAR
AST SERPL-CCNC: 16 U/L (ref 10–38)
BASOPHILS # BLD: 0 K/UL (ref 0–0.1)
BASOPHILS NFR BLD: 0 % (ref 0–2)
BILIRUB SERPL-MCNC: 0.2 MG/DL (ref 0.2–1)
BILIRUB UR QL: NEGATIVE
BUN SERPL-MCNC: 13 MG/DL (ref 7–18)
BUN/CREAT SERPL: 12 (ref 12–20)
CALCIUM SERPL-MCNC: 9.7 MG/DL (ref 8.5–10.1)
CHLORIDE SERPL-SCNC: 104 MMOL/L (ref 100–111)
CO2 SERPL-SCNC: 30 MMOL/L (ref 21–32)
COLOR UR: YELLOW
CREAT SERPL-MCNC: 1.08 MG/DL (ref 0.6–1.3)
DIFFERENTIAL METHOD BLD: ABNORMAL
EOSINOPHIL # BLD: 0.2 K/UL (ref 0–0.4)
EOSINOPHIL NFR BLD: 4 % (ref 0–5)
ERYTHROCYTE [DISTWIDTH] IN BLOOD BY AUTOMATED COUNT: 14.5 % (ref 11.6–14.5)
GLOBULIN SER CALC-MCNC: 3.8 G/DL (ref 2–4)
GLUCOSE SERPL-MCNC: 143 MG/DL (ref 74–99)
GLUCOSE UR STRIP.AUTO-MCNC: >1000 MG/DL
HCT VFR BLD AUTO: 39.4 % (ref 35–45)
HGB BLD-MCNC: 11.9 G/DL (ref 12–16)
HGB UR QL STRIP: NEGATIVE
KETONES UR QL STRIP.AUTO: NEGATIVE MG/DL
LEUKOCYTE ESTERASE UR QL STRIP.AUTO: NEGATIVE
LIPASE SERPL-CCNC: 149 U/L (ref 73–393)
LYMPHOCYTES # BLD: 1.7 K/UL (ref 0.9–3.6)
LYMPHOCYTES NFR BLD: 36 % (ref 21–52)
MCH RBC QN AUTO: 25.3 PG (ref 24–34)
MCHC RBC AUTO-ENTMCNC: 30.2 G/DL (ref 31–37)
MCV RBC AUTO: 83.7 FL (ref 74–97)
MONOCYTES # BLD: 0.4 K/UL (ref 0.05–1.2)
MONOCYTES NFR BLD: 9 % (ref 3–10)
NEUTS SEG # BLD: 2.4 K/UL (ref 1.8–8)
NEUTS SEG NFR BLD: 51 % (ref 40–73)
NITRITE UR QL STRIP.AUTO: NEGATIVE
PH UR STRIP: 7 [PH] (ref 5–8)
PLATELET # BLD AUTO: 332 K/UL (ref 135–420)
PMV BLD AUTO: 11.6 FL (ref 9.2–11.8)
POTASSIUM SERPL-SCNC: 4.1 MMOL/L (ref 3.5–5.5)
PROT SERPL-MCNC: 7.3 G/DL (ref 6.4–8.2)
PROT UR STRIP-MCNC: NEGATIVE MG/DL
RBC # BLD AUTO: 4.71 M/UL (ref 4.2–5.3)
SODIUM SERPL-SCNC: 138 MMOL/L (ref 136–145)
SP GR UR REFRACTOMETRY: 1.02 (ref 1–1.03)
UROBILINOGEN UR QL STRIP.AUTO: 0.2 EU/DL (ref 0.2–1)
WBC # BLD AUTO: 4.6 K/UL (ref 4.6–13.2)

## 2021-02-10 PROCEDURE — 99284 EMERGENCY DEPT VISIT MOD MDM: CPT

## 2021-02-10 PROCEDURE — 81003 URINALYSIS AUTO W/O SCOPE: CPT

## 2021-02-10 PROCEDURE — 74011250636 HC RX REV CODE- 250/636: Performed by: PHYSICIAN ASSISTANT

## 2021-02-10 PROCEDURE — 74011250636 HC RX REV CODE- 250/636: Performed by: EMERGENCY MEDICINE

## 2021-02-10 PROCEDURE — 85025 COMPLETE CBC W/AUTO DIFF WBC: CPT

## 2021-02-10 PROCEDURE — 74019 RADEX ABDOMEN 2 VIEWS: CPT

## 2021-02-10 PROCEDURE — 96374 THER/PROPH/DIAG INJ IV PUSH: CPT

## 2021-02-10 PROCEDURE — 80053 COMPREHEN METABOLIC PANEL: CPT

## 2021-02-10 PROCEDURE — 93005 ELECTROCARDIOGRAM TRACING: CPT

## 2021-02-10 PROCEDURE — 74011250637 HC RX REV CODE- 250/637: Performed by: PHYSICIAN ASSISTANT

## 2021-02-10 PROCEDURE — 83690 ASSAY OF LIPASE: CPT

## 2021-02-10 RX ORDER — ONDANSETRON 2 MG/ML
4 INJECTION INTRAMUSCULAR; INTRAVENOUS
Status: COMPLETED | OUTPATIENT
Start: 2021-02-10 | End: 2021-02-10

## 2021-02-10 RX ORDER — DICYCLOMINE HYDROCHLORIDE 10 MG/5ML
20 SOLUTION ORAL 4 TIMES DAILY
Qty: 1 BOTTLE | Refills: 0 | Status: SHIPPED | OUTPATIENT
Start: 2021-02-10 | End: 2021-11-26

## 2021-02-10 RX ORDER — DICYCLOMINE HYDROCHLORIDE 10 MG/1
10 CAPSULE ORAL
Status: COMPLETED | OUTPATIENT
Start: 2021-02-10 | End: 2021-02-10

## 2021-02-10 RX ADMIN — SODIUM CHLORIDE 1000 ML: 9 INJECTION, SOLUTION INTRAVENOUS at 17:58

## 2021-02-10 RX ADMIN — DICYCLOMINE HYDROCHLORIDE 10 MG: 10 CAPSULE ORAL at 19:07

## 2021-02-10 RX ADMIN — ONDANSETRON 4 MG: 2 INJECTION INTRAMUSCULAR; INTRAVENOUS at 17:58

## 2021-02-10 NOTE — PROGRESS NOTES
Date/Time:  2/10/2021 10:24 AM   Call within 2 business days of discharge: Yes   Attempted to reach patient by telephone. Left HIPPA compliant message requesting a return call. This episode is resolved. Covid test negative.

## 2021-02-10 NOTE — ED PROVIDER NOTES
EMERGENCY DEPARTMENT HISTORY AND PHYSICAL EXAM    4:18 PM      Date: 2/10/2021  Patient Name: Sylvester Estrella    History of Presenting Illness     Chief Complaint   Patient presents with    Abdominal Pain         History Provided By: Patient    Additional History (Context): Sylvester Estrella is a 64 y.o. female with hx of SBO, DM, HTN, anemia, GERD, chronic pelvic pain, and other noted PMH who presents with c/o lower abdominal pain x 4 days. Pt notes aching and stabbing pain. Notes intermittent nausea and vomiting. Notes last normal bowel movement was today, just PTA. Notes she was evaluated for the same pain on 2/8, denies changes in symptoms. Denies fever or chills, chest pain, shortness of breath, dysuria, hematuria. Notes she has outpatient follow-up scheduled with Dr. Roseline Crook on Friday. Denies sick contacts, known exposure to [employer]. PCP: Brianda Mo MD    Current Outpatient Medications   Medication Sig Dispense Refill    dicyclomine (BENTYL) 10 mg/5 mL soln oral solution Take 10 mL by mouth four (4) times daily. 1 Bottle 0    Farxiga 10 mg tab tablet take 1 tablet by mouth once daily 90 Tab 3    ondansetron (Zofran ODT) 4 mg disintegrating tablet Take 1 tablets every 6-8 hours as needed for nausea and vomiting. 30 Tab 1    promethazine (PHENERGAN) 25 mg suppository Insert 1-2 Suppositories into rectum every six (6) hours as needed for Nausea. 24 Suppository 0    dulaglutide (Trulicity) 6.83 LD/2.3 mL sub-q pen 0.5 mL by SubCUTAneous route every seven (7) days. 3 Pen 1    Insulin Needles, Disposable, (Nia Pen Needle) 32 gauge x 5/32\" ndle Give injection once a week 30 Pen Needle 2    doxepin (SINEquan) 50 mg capsule Take 1 Cap by mouth nightly. 90 Cap 3    amLODIPine (NORVASC) 5 mg tablet Take 1 Tab by mouth daily.  90 Tab 3    albuterol sulfate (ProAir RespiClick) 90 mcg/actuation breath activated inhaler Take 2 Puffs by inhalation every four (4) hours as needed for Wheezing, Shortness of Breath or Cough. 1 Inhaler 5    albuterol (PROVENTIL VENTOLIN) 2.5 mg /3 mL (0.083 %) nebu 3 mL by Nebulization route every six (6) hours as needed for Wheezing. 30 Nebule 1    glucose blood VI test strips (OneTouch Ultra Test) strip Check blood glucose as directed 100 Strip 1    bisoprolol-hydroCHLOROthiazide (ZIAC) 2.5-6.25 mg per tablet take 1 tablet by mouth once daily 90 Tab 3    omeprazole (PRILOSEC) 10 mg capsule take 1 capsule by mouth once daily 90 Cap 4    multivitamin (ONE A DAY) tablet Take 1 Tab by mouth daily.          Past History     Past Medical History:  Past Medical History:   Diagnosis Date    Abdominal adhesions     Adnexal cyst 7/30/2019    Left    Anemia     Asthma     Chronic pelvic pain in female 7/30/2019    Diabetes mellitus     Dyskinesia     bilateral    Essential hypertension     GERD (gastroesophageal reflux disease)     Hypertension     Menopause     Microhematuria     Rheumatoid arteritis ([address] 2018    Stool color black        Past Surgical History:  Past Surgical History:   Procedure Laterality Date    COLONOSCOPY N/A 1/21/2019    COLON[facility] performed [facility] at [facility] ENDOSCOPY    HX CHOLECYSTECTOMY  6/28/10    HX COLONOSCOPY      HX ENDOSCOPY      HX HERNIA REPAIR      HX HYSTERECTOMY  1989    Fibroids    HX KNEE REPLACEMENT Left     HX KNEE REPLACEMENT Right 06/2018    HX OOPHORECTOMY  12/2000    HX ORTHOPAEDIC Right     ankle- multiple surgeries    HX SMALL BOWEL RESECTION  12/2000    HX SMALL BOWEL RESECTION  02/21/2017    Dr. Billy Gerardo         Family History:  Family History   Problem Relation Age of Onset    Hypertension Other         parent    Breast Cancer Other 21    Heart Disease Father     Diabetes Father     Lung Disease Father     Diabetes Mother     Hypertension Other         sibling    Diabetes Other         parent    Kidney Disease Brother     Diabetes Brother     Lung Disease Brother        Social History:  Social History     Tobacco Use    Smoking status: Never Smoker    Smokeless tobacco: Never Used   Substance Use Topics    Alcohol use: No    Drug use: No       Allergies: Allergies   Allergen Reactions    Macrodantin [Nitrofurantoin Macrocrystalline] Itching and Other (comments)    Tape [Adhesive] Other (comments)     Paper tape-- feels like burning skin  Burned skin when had wound vac         Review of Systems       Review of Systems   Constitutional: Negative for chills and fever. Respiratory: Negative for shortness of breath. Cardiovascular: Negative for chest pain. Gastrointestinal: Positive for abdominal pain, nausea and vomiting. Skin: Negative for rash. Neurological: Negative for weakness. All other systems reviewed and are negative. Physical Exam     Visit Vitals  BP (!) 145/71   Pulse 99   Temp 98.3 °F (36.8 °C)   Resp 18   SpO2 100%         Physical Exam  Vitals signs and nursing note reviewed. Constitutional:       General: She is not in acute distress. Appearance: She is well-developed. She is not diaphoretic. HENT:      Head: Normocephalic and atraumatic. Neck:      Musculoskeletal: Normal range of motion and neck supple. Cardiovascular:      Rate and Rhythm: Normal rate and regular rhythm. Heart sounds: Normal heart sounds. No murmur. No friction rub. No gallop. Pulmonary:      Effort: Pulmonary effort is normal. No respiratory distress. Breath sounds: Normal breath sounds. No wheezing or rales. Abdominal:      General: Abdomen is flat. Bowel sounds are normal.      Palpations: Abdomen is soft. Tenderness: There is abdominal tenderness in the suprapubic area. There is no right CVA tenderness, left CVA tenderness, guarding or rebound. Negative signs include Feldman's sign and Rovsing's sign. Musculoskeletal: Normal range of motion. Skin:     General: Skin is warm. Findings: No rash.    Neurological: Mental Status: She is alert. Diagnostic Study Results     Labs -  Recent Results (from the past 12 hour(s))   URINALYSIS W/ RFLX MICROSCOPIC    Collection Time: 02/10/21  3:30 PM   Result Value Ref Range    Color YELLOW      Appearance CLEAR      Specific gravity 1.018 1.005 - 1.030      pH (UA) 7.0 5.0 - 8.0      Protein Negative NEG mg/dL    Glucose >1,000 (A) NEG mg/dL    Ketone Negative NEG mg/dL    Bilirubin Negative NEG      Blood Negative NEG      Urobilinogen 0.2 0.2 - 1.0 EU/dL    Nitrites Negative NEG      Leukocyte Esterase Negative NEG     CBC WITH AUTOMATED DIFF    Collection Time: 02/10/21  4:36 PM   Result Value Ref Range    WBC 4.6 4.6 - 13.2 K/uL    RBC 4.71 4.20 - 5.30 M/uL    HGB 11.9 (L) 12.0 - 16.0 g/dL    HCT 39.4 35.0 - 45.0 %    MCV 83.7 74.0 - 97.0 FL    MCH 25.3 24.0 - 34.0 PG    MCHC 30.2 (L) 31.0 - 37.0 g/dL    RDW 14.5 11.6 - 14.5 %    PLATELET 392 264 - 247 K/uL    MPV 11.6 9.2 - 11.8 FL    NEUTROPHILS 51 40 - 73 %    LYMPHOCYTES 36 21 - 52 %    MONOCYTES 9 3 - 10 %    EOSINOPHILS 4 0 - 5 %    BASOPHILS 0 0 - 2 %    ABS. NEUTROPHILS 2.4 1.8 - 8.0 K/UL    ABS. LYMPHOCYTES 1.7 0.9 - 3.6 K/UL    ABS. MONOCYTES 0.4 0.05 - 1.2 K/UL    ABS. EOSINOPHILS 0.2 0.0 - 0.4 K/UL    ABS. BASOPHILS 0.0 0.0 - 0.1 K/UL    DF AUTOMATED     METABOLIC PANEL, COMPREHENSIVE    Collection Time: 02/10/21  4:36 PM   Result Value Ref Range    Sodium 138 136 - 145 mmol/L    Potassium 4.1 3.5 - 5.5 mmol/L    Chloride 104 100 - 111 mmol/L    CO2 30 21 - 32 mmol/L    Anion gap 4 3.0 - 18 mmol/L    Glucose 143 (H) 74 - 99 mg/dL    BUN 13 7.0 - 18 MG/DL    Creatinine 1.08 0.6 - 1.3 MG/DL    BUN/Creatinine ratio 12 12 - 20      GFR est AA >60 >60 ml/min/1.73m2    GFR est non-AA 52 (L) >60 ml/min/1.73m2    Calcium 9.7 8.5 - 10.1 MG/DL    Bilirubin, total 0.2 0.2 - 1.0 MG/DL    ALT (SGPT) 27 13 - 56 U/L    AST (SGOT) 16 10 - 38 U/L    Alk.  phosphatase 142 (H) 45 - 117 U/L    Protein, total 7.3 6.4 - 8.2 g/dL    Albumin 3.5 3.4 - 5.0 g/dL    Globulin 3.8 2.0 - 4.0 g/dL    A-G Ratio 0.9 0.8 - 1.7     LIPASE    Collection Time: 02/10/21  4:36 PM   Result Value Ref Range    Lipase 149 73 - 393 U/L   EKG, 12 LEAD, INITIAL    Collection Time: 02/10/21  5:28 PM   Result Value Ref Range    Ventricular Rate 81 BPM    Atrial Rate 81 BPM    P-R Interval 142 ms    QRS Duration 70 ms    Q-T Interval 382 ms    QTC Calculation (Bezet) 443 ms    Calculated P Axis 62 degrees    Calculated R Axis 55 degrees    Calculated T Axis 84 degrees    Diagnosis       Normal sinus rhythm  Nonspecific T wave abnormality  Abnormal ECG  When compared with ECG of 10-DEC-2020 20:19,  No significant change was found         Radiologic Studies -   XR ABD FLAT/ ERECT   Final Result      Unremarkable radiographic examination of the abdomen.       _______________         Medical Decision Making   I am the first provider for this patient. I reviewed the vital signs, available nursing notes, past medical history, past surgical history, family history and social history. Vital Signs-Reviewed the patient's vital signs. Pulse Oximetry Analysis -  100% on room air     Records Reviewed: Nursing Notes and Old Medical Records (Time of Review: 4:18 PM)    ED Course: Progress Notes, Reevaluation, and Consults:  7:46 PM Reviewed results with patient. Tolerating PO, no distress. Discussed need for close outpatient follow-up as scheduled this week. Discussed strict return precautions, including fever, vomiting, inability to have a bowel movement or pass gas, or any other medical concerns. Pt in agreement. Provider Notes (Medical Decision Making): 70-year-old female who presents to the ED due to lower abdominal pain x 4 days. Afebrile, nontoxic-appearing, looks well. Abdomen soft, no rebound or guarding. Labs essentially unremarkable except for hyperglycemia, no evidence of DKA. Abdominal x-ray negative for acute process.   Patient tolerating p.o., no distress. Stable for discharge with symptomatic management, close outpatient follow-up, and strict return precautions for any new or worsening symptoms. Diagnosis     Clinical Impression:   1. Abdominal pain, generalized        Disposition: discharged    Follow-up Information     Follow up With Specialties Details Why [address] [facility] EMERGENCY DEPT Emergency Medicine  If symptoms worsen [address] [facility]    Brent Harden MD Family Medicine, Internal Medicine Schedule an appointment as soon as possible for a visit   [address] [facility] [phone]             Patient's Medications   Start Taking    DICYCLOMINE (BENTYL) 10 MG/5 ML SOLN ORAL SOLUTION    Take 10 mL by mouth four (4) times daily. Continue Taking    ALBUTEROL (PROVENTIL VENTOLIN) 2.5 MG /3 ML (0.083 %) NEBU    3 mL by Nebulization route every six (6) hours as needed for Wheezing. ALBUTEROL SULFATE (PROAIR RESPICLICK) 90 MCG/ACTUATION BREATH ACTIVATED INHALER    Take 2 Puffs by inhalation every four (4) hours as needed for Wheezing, Shortness of Breath or Cough. AMLODIPINE (NORVASC) 5 MG TABLET    Take 1 Tab by mouth daily. BISOPROLOL-HYDROCHLOROTHIAZIDE (ZIAC) 2.5-6.25 MG PER TABLET    take 1 tablet by mouth once daily    DOXEPIN (SINEQUAN) 50 MG CAPSULE    Take 1 Cap by mouth nightly. DULAGLUTIDE (TRULICITY) 7.01 UQ/4.3 ML SUB-Q PEN    0.5 mL by SubCUTAneous route every seven (7) days. FARXIGA 10 MG TAB TABLET    take 1 tablet by mouth once daily    GLUCOSE BLOOD VI TEST STRIPS (ONETOUCH ULTRA TEST) STRIP    Check blood glucose as directed    INSULIN NEEDLES, DISPOSABLE, (JESSA PEN NEEDLE) 32 GAUGE X 5/32\" NDLE    Give injection once a week    MULTIVITAMIN (ONE A DAY) TABLET    Take 1 Tab by mouth daily.     OMEPRAZOLE (PRILOSEC) 10 MG CAPSULE    take 1 capsule by mouth once daily    ONDANSETRON (ZOFRAN ODT) 4 MG DISINTEGRATING TABLET    Take 1 tablets every 6-8 hours as needed for nausea and vomiting. PROMETHAZINE (PHENERGAN) 25 MG SUPPOSITORY    Insert 1-2 Suppositories into rectum every six (6) hours as needed for Nausea. These Medications have changed    No medications on file   Stop Taking    No medications on file       Dictation disclaimer:  Please note that this dictation was completed with [email or web address], the computer voice recognition software. Quite often unanticipated grammatical, syntax, homophones, and other interpretive errors are inadvertently transcribed by the computer software. Please disregard these errors. Please excuse any errors that have escaped final proofreading.

## 2021-02-11 ENCOUNTER — PATIENT OUTREACH (OUTPATIENT)
Dept: CASE MANAGEMENT | Age: 61
End: 2021-02-11

## 2021-02-11 LAB
ATRIAL RATE: 81 BPM
CALCULATED P AXIS, ECG09: 62 DEGREES
CALCULATED R AXIS, ECG10: 55 DEGREES
CALCULATED T AXIS, ECG11: 84 DEGREES
DIAGNOSIS, 93000: NORMAL
P-R INTERVAL, ECG05: 142 MS
Q-T INTERVAL, ECG07: 382 MS
QRS DURATION, ECG06: 70 MS
QTC CALCULATION (BEZET), ECG08: 443 MS
VENTRICULAR RATE, ECG03: 81 BPM

## 2021-02-11 NOTE — DISCHARGE INSTRUCTIONS
Take medication as prescribed. Follow-up with your primary care physician and surgeon as scheduled for reassessment. Bring the results from this visit with you for their review. Return to the ED immediately for any new, worsening, or persistent symptoms, including fever, vomiting, or any other medical concerns.

## 2021-02-11 NOTE — PROGRESS NOTES
Patient contacted regarding recent discharge and COVID-19 risk. Discussed COVID-19 related testing which was available at this time. Test results were negative. Patient informed of results, if available? yes        Outreach made within 2 business days of discharge: Yes     Care Transition Nurse/ Ambulatory Care Manager/ LPN Care Coordinator contacted the patient by telephone to perform post discharge assessment. Verified name and  with patient as identifiers. Patient reported that her symptoms are still the same but not worsening. Pt. Reported that she has an upcoming appt with her GI doctor next week. Pt. Also states that she tried to get sooner  Appointment with GI but was unable to get one. Reminded Pt. to go to the nearest emergency room for chest pain, shortness of breath, returning of symptoms that brought her to the emergency room and/or worsening of symptoms. Pt. Verbalized and repeated back understanding. Patient has following risk factors of: asthma and diabetes. CTN/ACM/LPN reviewed discharge instructions, medical action plan and red flags related to discharge diagnosis. Reviewed and educated them on any new and changed medications related to discharge diagnosis. Advised obtaining a 90-day supply of all daily and as-needed medications. Advance Care Planning:   Does patient have an Advance Directive: none noted at this time. Education provided regarding infection prevention, and signs and symptoms of COVID-19 and when to seek medical attention with patient who verbalized understanding. Discussed exposure protocols and quarantine from [address] you at higher risk for severe illness  and given an opportunity for questions and concerns. The patient agrees to contact the COVID-19 hotline [phone] or PCP office for questions related to their healthcare. CTN/ACM/LPN provided contact information for future reference.     From CDC: Are you at higher risk for severe illness?  Wash your hands often.  Avoid close contact (6 feet, which is about two arm lengths) with people who are sick.  Put distance between yourself and other people if COVID-19 is spreading in your community.  Clean and disinfect frequently touched surfaces.  Avoid all cruise travel and non-essential air travel.  Call your healthcare professional if you have concerns about COVID-19 and your underlying condition or if you are sick. For more information on steps you can take to protect yourself, see CDC's How to Protect Yourself      Patient/family/caregiver given information for [facility] and agrees to enroll unable to offer , Pt. kept the conversation short. Plan for follow-up call in 7-14 days based on severity of symptoms and risk factors.

## 2021-02-15 ENCOUNTER — APPOINTMENT (OUTPATIENT)
Dept: CT IMAGING | Age: 61
End: 2021-02-15
Attending: PHYSICIAN ASSISTANT
Payer: COMMERCIAL

## 2021-02-15 ENCOUNTER — HOSPITAL ENCOUNTER (EMERGENCY)
Age: 61
Discharge: HOME | End: 2021-02-15
Attending: EMERGENCY MEDICINE
Payer: COMMERCIAL

## 2021-02-15 VITALS
SYSTOLIC BLOOD PRESSURE: 125 MMHG | OXYGEN SATURATION: 100 % | DIASTOLIC BLOOD PRESSURE: 77 MMHG | BODY MASS INDEX: 24.41 KG/M2 | WEIGHT: 143 LBS | HEIGHT: 64 IN | TEMPERATURE: 98.4 F | HEART RATE: 79 BPM | RESPIRATION RATE: 20 BRPM

## 2021-02-15 DIAGNOSIS — R19.7 DIARRHEA, UNSPECIFIED TYPE: Primary | ICD-10-CM

## 2021-02-15 LAB
ALBUMIN SERPL-MCNC: 3.7 G/DL (ref 3.4–5)
ALBUMIN/GLOB SERPL: 0.8 {RATIO} (ref 0.8–1.7)
ALP SERPL-CCNC: 154 U/L (ref 45–117)
ALT SERPL-CCNC: 31 U/L (ref 13–56)
ANION GAP SERPL CALC-SCNC: 5 MMOL/L (ref 3–18)
APPEARANCE UR: CLEAR
AST SERPL-CCNC: 18 U/L (ref 10–38)
BASOPHILS # BLD: 0 K/UL (ref 0–0.1)
BASOPHILS NFR BLD: 1 % (ref 0–2)
BILIRUB SERPL-MCNC: 0.3 MG/DL (ref 0.2–1)
BILIRUB UR QL: NEGATIVE
BUN SERPL-MCNC: 15 MG/DL (ref 7–18)
BUN/CREAT SERPL: 12 (ref 12–20)
CALCIUM SERPL-MCNC: 9.7 MG/DL (ref 8.5–10.1)
CHLORIDE SERPL-SCNC: 103 MMOL/L (ref 100–111)
CO2 SERPL-SCNC: 28 MMOL/L (ref 21–32)
COLOR UR: YELLOW
CREAT SERPL-MCNC: 1.21 MG/DL (ref 0.6–1.3)
DIFFERENTIAL METHOD BLD: ABNORMAL
EOSINOPHIL # BLD: 0.2 K/UL (ref 0–0.4)
EOSINOPHIL NFR BLD: 4 % (ref 0–5)
ERYTHROCYTE [DISTWIDTH] IN BLOOD BY AUTOMATED COUNT: 15.1 % (ref 11.6–14.5)
GLOBULIN SER CALC-MCNC: 4.8 G/DL (ref 2–4)
GLUCOSE SERPL-MCNC: 99 MG/DL (ref 74–99)
GLUCOSE UR STRIP.AUTO-MCNC: 250 MG/DL
HCT VFR BLD AUTO: 43 % (ref 35–45)
HGB BLD-MCNC: 12.9 G/DL (ref 12–16)
HGB UR QL STRIP: NEGATIVE
KETONES UR QL STRIP.AUTO: NEGATIVE MG/DL
LEUKOCYTE ESTERASE UR QL STRIP.AUTO: NEGATIVE
LIPASE SERPL-CCNC: 179 U/L (ref 73–393)
LYMPHOCYTES # BLD: 2.4 K/UL (ref 0.9–3.6)
LYMPHOCYTES NFR BLD: 45 % (ref 21–52)
MCH RBC QN AUTO: 25.2 PG (ref 24–34)
MCHC RBC AUTO-ENTMCNC: 30 G/DL (ref 31–37)
MCV RBC AUTO: 84 FL (ref 74–97)
MONOCYTES # BLD: 0.4 K/UL (ref 0.05–1.2)
MONOCYTES NFR BLD: 7 % (ref 3–10)
NEUTS SEG # BLD: 2.4 K/UL (ref 1.8–8)
NEUTS SEG NFR BLD: 43 % (ref 40–73)
NITRITE UR QL STRIP.AUTO: NEGATIVE
PH UR STRIP: 5.5 [PH] (ref 5–8)
PLATELET # BLD AUTO: 355 K/UL (ref 135–420)
PMV BLD AUTO: 11.5 FL (ref 9.2–11.8)
POTASSIUM SERPL-SCNC: 3.6 MMOL/L (ref 3.5–5.5)
PROT SERPL-MCNC: 8.5 G/DL (ref 6.4–8.2)
PROT UR STRIP-MCNC: NEGATIVE MG/DL
RBC # BLD AUTO: 5.12 M/UL (ref 4.2–5.3)
SODIUM SERPL-SCNC: 136 MMOL/L (ref 136–145)
SP GR UR REFRACTOMETRY: 1.01 (ref 1–1.03)
UROBILINOGEN UR QL STRIP.AUTO: 0.2 EU/DL (ref 0.2–1)
WBC # BLD AUTO: 5.3 K/UL (ref 4.6–13.2)

## 2021-02-15 PROCEDURE — 74011000636 HC RX REV CODE- 636: Performed by: EMERGENCY MEDICINE

## 2021-02-15 PROCEDURE — 80053 COMPREHEN METABOLIC PANEL: CPT

## 2021-02-15 PROCEDURE — 96374 THER/PROPH/DIAG INJ IV PUSH: CPT

## 2021-02-15 PROCEDURE — 74011250636 HC RX REV CODE- 250/636: Performed by: PHYSICIAN ASSISTANT

## 2021-02-15 PROCEDURE — 74011250637 HC RX REV CODE- 250/637: Performed by: PHYSICIAN ASSISTANT

## 2021-02-15 PROCEDURE — 83690 ASSAY OF LIPASE: CPT

## 2021-02-15 PROCEDURE — 74177 CT ABD & PELVIS W/CONTRAST: CPT

## 2021-02-15 PROCEDURE — 99283 EMERGENCY DEPT VISIT LOW MDM: CPT

## 2021-02-15 PROCEDURE — 85025 COMPLETE CBC W/AUTO DIFF WBC: CPT

## 2021-02-15 PROCEDURE — 81003 URINALYSIS AUTO W/O SCOPE: CPT

## 2021-02-15 PROCEDURE — 96361 HYDRATE IV INFUSION ADD-ON: CPT

## 2021-02-15 RX ORDER — METRONIDAZOLE 500 MG/1
500 TABLET ORAL 2 TIMES DAILY
Qty: 14 TAB | Refills: 0 | Status: SHIPPED | OUTPATIENT
Start: 2021-02-15 | End: 2021-02-22

## 2021-02-15 RX ORDER — ONDANSETRON 2 MG/ML
4 INJECTION INTRAMUSCULAR; INTRAVENOUS
Status: COMPLETED | OUTPATIENT
Start: 2021-02-15 | End: 2021-02-15

## 2021-02-15 RX ORDER — DICYCLOMINE HYDROCHLORIDE 10 MG/1
10 CAPSULE ORAL 4 TIMES DAILY
Status: DISCONTINUED | OUTPATIENT
Start: 2021-02-15 | End: 2021-02-15 | Stop reason: HOSPADM

## 2021-02-15 RX ORDER — HYDROCODONE BITARTRATE AND ACETAMINOPHEN 5; 325 MG/1; MG/1
1 TABLET ORAL ONCE
Status: COMPLETED | OUTPATIENT
Start: 2021-02-15 | End: 2021-02-15

## 2021-02-15 RX ORDER — CIPROFLOXACIN 500 MG/1
500 TABLET ORAL 2 TIMES DAILY
Qty: 14 TAB | Refills: 0 | Status: SHIPPED | OUTPATIENT
Start: 2021-02-15 | End: 2021-02-22

## 2021-02-15 RX ADMIN — HYDROCODONE BITARTRATE AND ACETAMINOPHEN 1 TABLET: 5; 325 TABLET ORAL at 18:47

## 2021-02-15 RX ADMIN — DICYCLOMINE HYDROCHLORIDE 10 MG: 10 CAPSULE ORAL at 18:24

## 2021-02-15 RX ADMIN — IOPAMIDOL 96 ML: 612 INJECTION, SOLUTION INTRAVENOUS at 17:24

## 2021-02-15 RX ADMIN — ONDANSETRON 4 MG: 2 INJECTION INTRAMUSCULAR; INTRAVENOUS at 18:24

## 2021-02-15 RX ADMIN — SODIUM CHLORIDE 1000 ML: 900 INJECTION, SOLUTION INTRAVENOUS at 18:24

## 2021-02-15 NOTE — ED PROVIDER NOTES
EMERGENCY DEPARTMENT HISTORY AND PHYSICAL EXAM    Date: 2/15/2021  Patient Name: Edd Corral    History of Presenting Illness     Chief Complaint   Patient presents with    Diarrhea    Headache    Abdominal Pain         History Provided By: patient        Additional History (Context): Edd Corral is a 63yo F here with c/o diarrhea and headache. Reports sx for past 4 days. States she got the covid vaccine right before sx started so attributed her sx to a response to the vaccine. States diarrhea has been without blood. reports some nausea, no vomiting. Has h/o chronic abdominal pain. States this is c/w all previous episodes. Has Rx for bentyl and immodium but has been using pepto with more relief. Pt denies any fevers or chills, headache, dizziness or light headedness, ENT issues, CP or discomfort, SOB, cough, diaphoresis, melena/hematochezia, dysuria, hematuria, frequency, focal weakness/numbness/tingling, or rash. Patient has no other complaints at this time. PCP: Gabriel Medrano MD    Current Facility-Administered Medications   Medication Dose Route Frequency Provider Last Rate Last Admin    sodium chloride 0.9 % bolus infusion 1,000 mL  1,000 mL IntraVENous ONCE Elza Kang PA-C        ondansetron [facility]) injection 4 mg  4 mg IntraVENous NOW Elza Kang PA-C        dicyclomine (BENTYL) capsule 10 mg  10 mg Oral QID Elza Kang PA-C         Current Outpatient Medications   Medication Sig Dispense Refill    dicyclomine (BENTYL) 10 mg/5 mL soln oral solution Take 10 mL by mouth four (4) times daily. 1 Bottle 0    Farxiga 10 mg tab tablet take 1 tablet by mouth once daily 90 Tab 3    ondansetron (Zofran ODT) 4 mg disintegrating tablet Take 1 tablets every 6-8 hours as needed for nausea and vomiting. 30 Tab 1    promethazine (PHENERGAN) 25 mg suppository Insert 1-2 Suppositories into rectum every six (6) hours as needed for Nausea.  24 Suppository 0    dulaglutide (Trulicity) 5.60 VL/6.2 mL sub-q pen 0.5 mL by SubCUTAneous route every seven (7) days. 3 Pen 1    Insulin Needles, Disposable, (Nia Pen Needle) 32 gauge x 5/32\" ndle Give injection once a week 30 Pen Needle 2    doxepin (SINEquan) 50 mg capsule Take 1 Cap by mouth nightly. 90 Cap 3    amLODIPine (NORVASC) 5 mg tablet Take 1 Tab by mouth daily. 90 Tab 3    albuterol sulfate (ProAir RespiClick) 90 mcg/actuation breath activated inhaler Take 2 Puffs by inhalation every four (4) hours as needed for Wheezing, Shortness of Breath or Cough. 1 Inhaler 5    albuterol (PROVENTIL VENTOLIN) 2.5 mg /3 mL (0.083 %) nebu 3 mL by Nebulization route every six (6) hours as needed for Wheezing. 30 Nebule 1    glucose blood VI test strips (OneTouch Ultra Test) strip Check blood glucose as directed 100 Strip 1    bisoprolol-hydroCHLOROthiazide (ZIAC) 2.5-6.25 mg per tablet take 1 tablet by mouth once daily 90 Tab 3    omeprazole (PRILOSEC) 10 mg capsule take 1 capsule by mouth once daily 90 Cap 4    multivitamin (ONE A DAY) tablet Take 1 Tab by mouth daily.          Past History     Past Medical History:  Past Medical History:   Diagnosis Date    Abdominal adhesions     Adnexal cyst 7/30/2019    Left    Anemia     Asthma     Chronic pelvic pain in female 7/30/2019    Diabetes mellitus     Dyskinesia     bilateral    Essential hypertension     GERD (gastroesophageal reflux disease)     Hypertension     Menopause     Microhematuria     Rheumatoid arteritis ([facility] [address] 2018    Stool color black        Past Surgical History:  Past Surgical History:   Procedure Laterality Date    COLONOSCOPY N/A 1/21/2019    COLON[facility]COPY performed by [facility]ireya [facility]vin [facility] at [facility] ENDOSCOPY    HX CHOLECYSTECTOMY  6/28/10    HX COLONOSCOPY      HX ENDOSCOPY      HX HERNIA REPAIR      HX HYSTERECTOMY  1989    Fibroids    HX KNEE REPLACEMENT Left     HX KNEE REPLACEMENT Right 06/2018    HX OOPHORECTOMY  12/2000    HX ORTHOPAEDIC Right ankle- multiple surgeries    HX SMALL BOWEL RESECTION  12/2000    HX SMALL BOWEL RESECTION  02/21/2017    Dr. Carolina Gaona         Family History:  Family History   Problem Relation Age of Onset    Hypertension Other         parent    Breast Cancer Other 21    Heart Disease Father     Diabetes Father     Lung Disease Father     Diabetes Mother     Hypertension Other         sibling    Diabetes Other         parent    Kidney Disease Brother     Diabetes Brother     Lung Disease Brother        Social History:  Social History     Tobacco Use    Smoking status: Never Smoker    Smokeless tobacco: Never Used   Substance Use Topics    Alcohol use: No    Drug use: No       Allergies: Allergies   Allergen Reactions    Macrodantin [Nitrofurantoin Macrocrystalline] Itching and Other (comments)    Tape [Adhesive] Other (comments)     Paper tape-- feels like burning skin  Burned skin when had wound vac         Review of Systems       Review of Systems   Constitutional: Negative for chills and fever. HENT: Negative for nasal congestion, sore throat, rhinorrhea  Eyes: Negative. Respiratory: Negative for cough and negative for shortness of breath. Cardiovascular: Negative for chest pain and palpitations. Gastrointestinal: Negative for abdominal pain, constipation, diarrhea, nausea and vomiting. Genitourinary: Negative. Negative for difficulty urinating and flank pain. Musculoskeletal: Negative for back pain. Negative for gait problem and neck pain. Skin: Negative. Allergic/Immunologic: Negative. Neurological: Negative for dizziness, weakness, numbness and headaches. Psychiatric/Behavioral: Negative. All other systems reviewed and are negative.   All Other Systems Negative  Physical Exam     Vitals:    02/15/21 1543   BP: 125/77   Pulse: 79   Resp: 20   Temp: 98.4 °F (36.9 °C)   SpO2: 100%   Weight: 64.9 kg (143 lb)   Height: 5' 4\" (1.626 m)     Physical Exam  Vitals signs and nursing note reviewed. Constitutional:       General: She is not in acute distress. Appearance: She is well-developed. She is not diaphoretic. HENT:      Head: Normocephalic and atraumatic. Nose: Nose normal.   Eyes:      Conjunctiva/sclera: Conjunctivae normal.      Pupils: Pupils are equal, round, and reactive to light. Neck:      Musculoskeletal: Normal range of motion and neck supple. Cardiovascular:      Rate and Rhythm: Normal rate and regular rhythm. Pulmonary:      Effort: Pulmonary effort is normal. No respiratory distress. Breath sounds: Normal breath sounds. Abdominal:      Palpations: Abdomen is soft. Tenderness: There is generalized abdominal tenderness. There is no right CVA tenderness or left CVA tenderness. Musculoskeletal: Normal range of motion. Skin:     General: Skin is warm. Findings: No rash. Neurological:      Mental Status: She is alert and oriented to person, place, and time. Cranial Nerves: No cranial nerve deficit. Coordination: Coordination normal.   Psychiatric:         Behavior: Behavior normal.           Diagnostic Study Results     Labs -     Recent Results (from the past 12 hour(s))   CBC WITH AUTOMATED DIFF    Collection Time: 02/15/21  3:54 PM   Result Value Ref Range    WBC 5.3 4.6 - 13.2 K/uL    RBC 5.12 4.20 - 5.30 M/uL    HGB 12.9 12.0 - 16.0 g/dL    HCT 43.0 35.0 - 45.0 %    MCV 84.0 74.0 - 97.0 FL    MCH 25.2 24.0 - 34.0 PG    MCHC 30.0 (L) 31.0 - 37.0 g/dL    RDW 15.1 (H) 11.6 - 14.5 %    PLATELET 754 606 - 266 K/uL    MPV 11.5 9.2 - 11.8 FL    NEUTROPHILS 43 40 - 73 %    LYMPHOCYTES 45 21 - 52 %    MONOCYTES 7 3 - 10 %    EOSINOPHILS 4 0 - 5 %    BASOPHILS 1 0 - 2 %    ABS. NEUTROPHILS 2.4 1.8 - 8.0 K/UL    ABS. LYMPHOCYTES 2.4 0.9 - 3.6 K/UL    ABS. MONOCYTES 0.4 0.05 - 1.2 K/UL    ABS. EOSINOPHILS 0.2 0.0 - 0.4 K/UL    ABS.  BASOPHILS 0.0 0.0 - 0.1 K/UL    DF AUTOMATED     METABOLIC PANEL, COMPREHENSIVE    Collection Time: 02/15/21  3:54 PM   Result Value Ref Range    Sodium 136 136 - 145 mmol/L    Potassium 3.6 3.5 - 5.5 mmol/L    Chloride 103 100 - 111 mmol/L    CO2 28 21 - 32 mmol/L    Anion gap 5 3.0 - 18 mmol/L    Glucose 99 74 - 99 mg/dL    BUN 15 7.0 - 18 MG/DL    Creatinine 1.21 0.6 - 1.3 MG/DL    BUN/Creatinine ratio 12 12 - 20      GFR est AA 55 (L) >60 ml/min/1.73m2    GFR est non-AA 45 (L) >60 ml/min/1.73m2    Calcium 9.7 8.5 - 10.1 MG/DL    Bilirubin, total 0.3 0.2 - 1.0 MG/DL    ALT (SGPT) 31 13 - 56 U/L    AST (SGOT) 18 10 - 38 U/L    Alk. phosphatase 154 (H) 45 - 117 U/L    Protein, total 8.5 (H) 6.4 - 8.2 g/dL    Albumin 3.7 3.4 - 5.0 g/dL    Globulin 4.8 (H) 2.0 - 4.0 g/dL    A-G Ratio 0.8 0.8 - 1.7     LIPASE    Collection Time: 02/15/21  3:54 PM   Result Value Ref Range    Lipase 179 73 - 393 U/L   URINALYSIS W/ RFLX MICROSCOPIC    Collection Time: 02/15/21  3:54 PM   Result Value Ref Range    Color YELLOW      Appearance CLEAR      Specific gravity 1.008 1.005 - 1.030      pH (UA) 5.5 5.0 - 8.0      Protein Negative NEG mg/dL    Glucose 250 (A) NEG mg/dL    Ketone Negative NEG mg/dL    Bilirubin Negative NEG      Blood Negative NEG      Urobilinogen 0.2 0.2 - 1.0 EU/dL    Nitrites Negative NEG      Leukocyte Esterase Negative NEG         Radiologic Studies -   CT ABD PELV W CONT    (Results Pending)     CT Results  (Last 48 hours)    None        CXR Results  (Last 48 hours)    None            Medical Decision Making   I am the first provider for this patient. I reviewed the vital signs, available nursing notes, past medical history, past surgical history, family history and social history. Vital Signs-Reviewed the patient's vital signs.         Records Reviewed: Nursing notes, old medical records and any previous labs, imaging, visits, consultations pertinent to patient care    Procedures:  Procedures      ED Course: Progress Notes, Reevaluation, and Consults:      Provider Notes (Medical Decision Making): Work up reviewed. No acute findings. Ct abdomen shows signs c/w diarrheal episode, no sbo. Will treat with abx and recommend imodium or pepto. Pain control given. Will ensure pt f/u wit pcp. MED RECONCILIATION:  Current Facility-Administered Medications   Medication Dose Route Frequency    sodium chloride 0.9 % bolus infusion 1,000 mL  1,000 mL IntraVENous ONCE    ondansetron (ZOFRAN) injection 4 mg  4 mg IntraVENous NOW    dicyclomine (BENTYL) capsule 10 mg  10 mg Oral QID     Current Outpatient Medications   Medication Sig    dicyclomine (BENTYL) 10 mg/5 mL soln oral solution Take 10 mL by mouth four (4) times daily.  Farxiga 10 mg tab tablet take 1 tablet by mouth once daily    ondansetron (Zofran ODT) 4 mg disintegrating tablet Take 1 tablets every 6-8 hours as needed for nausea and vomiting.  promethazine (PHENERGAN) 25 mg suppository Insert 1-2 Suppositories into rectum every six (6) hours as needed for Nausea.  dulaglutide (Trulicity) 0.70 TC/7.7 mL sub-q pen 0.5 mL by SubCUTAneous route every seven (7) days.  Insulin Needles, Disposable, (Nia Pen Needle) 32 gauge x 5/32\" ndle Give injection once a week    doxepin (SINEquan) 50 mg capsule Take 1 Cap by mouth nightly.  amLODIPine (NORVASC) 5 mg tablet Take 1 Tab by mouth daily.  albuterol sulfate (ProAir RespiClick) 90 mcg/actuation breath activated inhaler Take 2 Puffs by inhalation every four (4) hours as needed for Wheezing, Shortness of Breath or Cough.  albuterol (PROVENTIL VENTOLIN) 2.5 mg /3 mL (0.083 %) nebu 3 mL by Nebulization route every six (6) hours as needed for Wheezing.     glucose blood VI test strips (OneTouch Ultra Test) strip Check blood glucose as directed    bisoprolol-hydroCHLOROthiazide (ZIAC) 2.5-6.25 mg per tablet take 1 tablet by mouth once daily    omeprazole (PRILOSEC) 10 mg capsule take 1 capsule by mouth once daily    multivitamin (ONE A DAY) tablet Take 1 Tab by mouth daily. Disposition:  home    DISCHARGE NOTE:     Pt has been reexamined. Patient has no new complaints, changes, or physical findings. Care plan outlined and precautions discussed. Discussed proper way to take medications. Discussed treatment plan, return precautions, symptomatic relief, and expected time to improvement. All questions answered. Patient is stable for discharge and outpatient management. Patient is ready to go home. Follow-up Information    None         Current Discharge Medication List                Diagnosis     Clinical Impression:   1. Diarrhea, unspecified type            Dictation disclaimer:  Please note that this dictation was completed with [employer], the [employer] voice recognition software. Quite often unanticipated grammatical, syntax, homophones, and other interpretive errors are inadvertently transcribed by the computer software. Please disregard these errors. Please excuse any errors that have escaped final proofreading.

## 2021-02-15 NOTE — ED TRIAGE NOTES
Pt arrives with c/o diarrhea and headache starting Friday evening after getting the Covid vaccine earlier in the day. Pt states non stop diarrhea.

## 2021-02-16 ENCOUNTER — PATIENT OUTREACH (OUTPATIENT)
Dept: CASE MANAGEMENT | Age: 61
End: 2021-02-16

## 2021-02-16 NOTE — PROGRESS NOTES
Contacted patient for COVID Suspect follow up. Call within 2 business days of discharge: Yes   Attempted to reach patient by telephone. Made two call attempts. No answer. Left HIPPA compliant message introducing myself, role and reason for call. Requested return call. Contact information provided. Made Several attempts to reach patient today. Attempts were unsuccessful. Episode resolved.

## 2021-02-17 ENCOUNTER — HOSPITAL ENCOUNTER (OUTPATIENT)
Dept: MAMMOGRAPHY | Age: 61
Discharge: HOME | End: 2021-02-17
Attending: FAMILY MEDICINE
Payer: COMMERCIAL

## 2021-02-17 DIAGNOSIS — Z12.31 ENCOUNTER FOR SCREENING MAMMOGRAM FOR BREAST CANCER: ICD-10-CM

## 2021-02-17 PROCEDURE — 77063 BREAST TOMOSYNTHESIS BI: CPT

## 2021-02-18 ENCOUNTER — OFFICE VISIT (OUTPATIENT)
Dept: SURGERY | Age: 61
End: 2021-02-18
Payer: COMMERCIAL

## 2021-02-18 VITALS
TEMPERATURE: 96.9 F | WEIGHT: 147.8 LBS | DIASTOLIC BLOOD PRESSURE: 86 MMHG | BODY MASS INDEX: 25.37 KG/M2 | HEART RATE: 80 BPM | SYSTOLIC BLOOD PRESSURE: 125 MMHG

## 2021-02-18 DIAGNOSIS — R53.83 FATIGUE, UNSPECIFIED TYPE: ICD-10-CM

## 2021-02-18 DIAGNOSIS — R10.84 GENERALIZED ABDOMINAL PAIN: Primary | ICD-10-CM

## 2021-02-18 DIAGNOSIS — R63.4 WEIGHT LOSS: ICD-10-CM

## 2021-02-18 DIAGNOSIS — R19.7 DIARRHEA, UNSPECIFIED TYPE: ICD-10-CM

## 2021-02-18 PROCEDURE — 99205 OFFICE O/P NEW HI 60 MIN: CPT | Performed by: SURGERY

## 2021-02-18 NOTE — PROGRESS NOTES
General Surgery Consult    Rossy Thornton  Admit date: (Not on file)    MRN: [id number]     : 1960     Age: 64 y.o. Attending Physician: June Gold MD [facility]      History of Present Illness:      Rossy Thornton is a 64 y.o. female who is very well-known to me. 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 The patient has also multiple medical problems and recently in addition to her multiple conditions she has been complaining of fatigue and weakness. She stated this has been happening for the last 6 months or so and she is also noticing some weight loss that is unintentional.  In addition to that she has been having some abdominal pain which she had for decades but this time she is stating that she has loose stool and sometimes diarrhea. She sometimes has nausea but she does not have any obstructive symptoms and she is always been able to pass flatus and have bowel movement that are loose. She is tolerating regular diet. She went to the emergency room multiple times including a visit 3 days ago where a CT scan of abdomen and pelvis was performed and showed fluid seen in the colon suggestive of diarrheal illness. It also showed some nonspecific inflammatory changes in the anterior mid abdomen with mid small bowel wall thickening that most likely represent adhesive disease. It also showed the left adnexal cystic mass that is about 4 x 3 cm in size which she had before. I asked the patient if she was seen by the GI team and she said that Dr. Sabas Cheung is her GI doctor and she is calling get them today to make an appointment with them.   She also stated that she has not been seen by the GYN since they closed the portal but she is looking for a new GYN team.    Patient Active Problem List    Diagnosis Date Noted    Abdominal pain 10/21/2020    Small bowel obstruction ([address] 09/11/2020    Anemia 08/10/2019    S/P small bowel resection 08/01/2019    Adnexal cyst 07/30/2019    Chronic pelvic pain in female 07/30/2019    Bowel obstruction (HCC) 08/23/2018    Non-intractable cyclical vomiting with nausea 01/24/2018    Type 2 diabetes mellitus with nephropathy ([address] 01/19/2018    Chronic abdominal pain 03/14/2017    Post-operative pain 03/14/2017    SBO (small bowel obstruction) (HCC) 02/21/2017    Osteoarthritis of left knee 06/27/2016    Hypertension 06/27/2016    Hyperlipidemia 06/27/2016    GERD (gastroesophageal reflux disease) 06/27/2016    Asthma 06/27/2016    Type 2 diabetes mellitus ([address] 06/27/2016    Sural neuritis 06/23/2014    Chest pain 04/20/2014    Essential hypertension, benign 02/27/2012    Generalized abdominal pain      Past Medical History:   Diagnosis Date    Abdominal adhesions     Adnexal cyst 7/30/2019    Left    Anemia     Asthma     Chronic pelvic pain in female 7/30/2019    Diabetes mellitus     Dyskinesia     bilateral    Essential hypertension     GERD (gastroesophageal reflux disease)     Hypertension     Menopause     Microhematuria     Rheumatoid arteritis ([address] 2018    Stool color black       Past Surgical History:   Procedure Laterality Date    COLONOSCOPY N/A 1/21/2019    COLONOSCOPY performed by Kassy Garvin MD at [address] [place] [address] HX CHOLECYSTECTOMY  6/28/10    HX COLONOSCOPY      HX ENDOSCOPY      HX HERNIA REPAIR      HX HYSTERECTOMY  1989    Fibroids    HX KNEE REPLACEMENT Left     HX KNEE REPLACEMENT Right 06/2018    HX OOPHORECTOMY  12/2000    HX ORTHOPAEDIC Right     ankle- multiple surgeries    HX SMALL BOWEL RESECTION  12/2000    HX SMALL BOWEL RESECTION  02/21/2017    Dr. Deepak Veronica        Social History     Tobacco Use    Smoking status: Never Smoker    Smokeless tobacco: Never Used   Substance Use Topics    Alcohol use: No      Social History     Tobacco Use Smoking Status Never Smoker   Smokeless Tobacco Never Used     Family History   Problem Relation Age of Onset    Hypertension Other         parent    Breast Cancer Other 21    Heart Disease Father     Diabetes Father     Lung Disease Father     Diabetes Mother     Hypertension Other         sibling    Diabetes Other         parent    Kidney Disease Brother     Diabetes Brother     Lung Disease Brother       Current Outpatient Medications   Medication Sig    dicyclomine (BENTYL) 10 mg/5 mL soln oral solution Take 10 mL by mouth four (4) times daily.  Farxiga 10 mg tab tablet take 1 tablet by mouth once daily    ondansetron (Zofran ODT) 4 mg disintegrating tablet Take 1 tablets every 6-8 hours as needed for nausea and vomiting.  promethazine (PHENERGAN) 25 mg suppository Insert 1-2 Suppositories into rectum every six (6) hours as needed for Nausea.  dulaglutide (Trulicity) 8.24 JK/1.5 mL sub-q pen 0.5 mL by SubCUTAneous route every seven (7) days.  Insulin Needles, Disposable, (Nia Pen Needle) 32 gauge x 5/32\" ndle Give injection once a week    doxepin (SINEquan) 50 mg capsule Take 1 Cap by mouth nightly.  amLODIPine (NORVASC) 5 mg tablet Take 1 Tab by mouth daily.  albuterol sulfate (ProAir RespiClick) 90 mcg/actuation breath activated inhaler Take 2 Puffs by inhalation every four (4) hours as needed for Wheezing, Shortness of Breath or Cough.  albuterol (PROVENTIL VENTOLIN) 2.5 mg /3 mL (0.083 %) nebu 3 mL by Nebulization route every six (6) hours as needed for Wheezing.  glucose blood VI test strips (OneTouch Ultra Test) strip Check blood glucose as directed    bisoprolol-hydroCHLOROthiazide (ZIAC) 2.5-6.25 mg per tablet take 1 tablet by mouth once daily    omeprazole (PRILOSEC) 10 mg capsule take 1 capsule by mouth once daily    multivitamin (ONE A DAY) tablet Take 1 Tab by mouth daily.     metroNIDAZOLE (FlagyL) 500 mg tablet Take 1 Tab by mouth two (2) times a day for 7 days.  ciprofloxacin HCl (Cipro) 500 mg tablet Take 1 Tab by mouth two (2) times a day for 7 days. No current facility-administered medications for this visit. Allergies   Allergen Reactions    Macrodantin [Nitrofurantoin Macrocrystalline] Itching and Other (comments)    Tape [Adhesive] Other (comments)     Paper tape-- feels like burning skin  Burned skin when had wound vac        Review of Systems:  Constitutional: positive for fatigue and weight loss  Eyes: negative  Ears, Nose, Mouth, Throat, and Face: negative  Respiratory: negative  Cardiovascular: negative  Gastrointestinal: positive for abdominal pain  Genitourinary:negative  Integument/Breast: negative  Hematologic/Lymphatic: negative  Musculoskeletal:negative  Neurological: negative  Behavioral/Psychiatric: negative  Endocrine: negative  Allergic/Immunologic: negative    Objective:     Visit Vitals  /86 (BP 1 Location: Right arm, BP Patient Position: Sitting)   Pulse 80   Temp 96.9 °F (36.1 °C)   Wt 67 kg (147 lb 12.8 oz)   BMI 25.37 kg/m²       Physical Exam:      General:  in no apparent distress, alert, oriented times 3, afebrile, normal vitals and cooperative   Eyes:  conjunctivae and sclerae normal, pupils equal, round, reactive to light   Throat & Neck: no erythema or exudates noted and neck supple and symmetrical; no palpable masses   Lungs:   clear to auscultation bilaterally   Heart:  Regular rate and rhythm   Abdomen:   rounded, soft, nontender, nondistended, no masses or organomegaly. Midline laparotomy with no evidence of any abdominal wall hernias.     Extremities: extremities normal, atraumatic, no cyanosis or edema   Skin: Normal.       Imaging and Lab Review:     CBC:   Lab Results   Component Value Date/Time    WBC 5.3 02/15/2021 03:54 PM    RBC 5.12 02/15/2021 03:54 PM    HGB 12.9 02/15/2021 03:54 PM    HCT 43.0 02/15/2021 03:54 PM    PLATELET 507 82/99/5239 03:54 PM     BMP:   Lab Results   Component Value Date/Time    Glucose 99 02/15/2021 03:54 PM    Sodium 136 02/15/2021 03:54 PM    Potassium 3.6 02/15/2021 03:54 PM    Chloride 103 02/15/2021 03:54 PM    CO2 28 02/15/2021 03:54 PM    BUN 15 02/15/2021 03:54 PM    Creatinine 1.21 02/15/2021 03:54 PM    Calcium 9.7 02/15/2021 03:54 PM     CMP:  Lab Results   Component Value Date/Time    Glucose 99 02/15/2021 03:54 PM    Sodium 136 02/15/2021 03:54 PM    Potassium 3.6 02/15/2021 03:54 PM    Chloride 103 02/15/2021 03:54 PM    CO2 28 02/15/2021 03:54 PM    BUN 15 02/15/2021 03:54 PM    Creatinine 1.21 02/15/2021 03:54 PM    Calcium 9.7 02/15/2021 03:54 PM    Anion gap 5 02/15/2021 03:54 PM    BUN/Creatinine ratio 12 02/15/2021 03:54 PM    Alk. phosphatase 154 (H) 02/15/2021 03:54 PM    Protein, total 8.5 (H) 02/15/2021 03:54 PM    Albumin 3.7 02/15/2021 03:54 PM    Globulin 4.8 (H) 02/15/2021 03:54 PM    A-G Ratio 0.8 02/15/2021 03:54 PM       No results found for this or any previous visit (from the past 24 hour(s)). images and reports reviewed    Assessment:   Maty Moore is a 64 y.o. female with multiple medical and surgical history that are very complicated especially her surgical history with multiple laparotomy and severe scarring and a history of enterocutaneous fistula that has healed completely. First thing I explained to the patient that she does not have any obstructive symptoms and I explained to her how difficult it is to even do any surgery on her which she understand and she agreed. Given the fact that there are no obstructive symptoms and the patient is tolerating diet and there is no constipation and her abdomen is not distended there is no need for any intervention from my standpoint. However I explained to the patient that her symptoms of weight loss and fatigue and loose stool and sometimes diarrhea are not normal and she needs to follow-up with the GI team to rule out infectious process or even malignancy but unlikely.   I also explained to her the importance of follow-up with the GYN team because of her adnexal structure.     Plan:     No need for any general surgery intervention  Follow-up with the GI team for evaluation of her weight loss and diarrhea  Follow-up with her primary care physician for further observation and treatment    Please call me if you have any questions (cell phone: [phone])     Signed By: Frederick Pichardo MD     February 18, 2021

## 2021-02-18 NOTE — PROGRESS NOTES
Lissa Rich is a 64 y.o. female (: 1960) presenting to address:    Chief Complaint   Patient presents with    Follow-up     Abd pain/ small bowel resection 08/10/19/ Multiple ER visits       Medication list and allergies have been reviewed with Lissa Rich and updated as of today's date. I have gone over all Medical, Surgical and Social History with Searios Layla and updated/added the information accordingly. 1. Have you been to the ER, Urgent Care or Hospitalized since your last visit? YES. Multiple Er visits/ ast ER visit Marcus mcnulty 21 abd pain      2. Have you followed up with your PCP or any other Physicians since your procedure/ last office visit?    NO

## 2021-02-24 ENCOUNTER — APPOINTMENT (OUTPATIENT)
Dept: CT IMAGING | Age: 61
End: 2021-02-24
Attending: STUDENT IN AN ORGANIZED HEALTH CARE EDUCATION/TRAINING PROGRAM
Payer: COMMERCIAL

## 2021-02-24 ENCOUNTER — TELEPHONE (OUTPATIENT)
Dept: FAMILY MEDICINE CLINIC | Age: 61
End: 2021-02-24

## 2021-02-24 ENCOUNTER — HOSPITAL ENCOUNTER (EMERGENCY)
Age: 61
Discharge: HOME | End: 2021-02-25
Attending: STUDENT IN AN ORGANIZED HEALTH CARE EDUCATION/TRAINING PROGRAM
Payer: COMMERCIAL

## 2021-02-24 DIAGNOSIS — R10.84 ABDOMINAL PAIN, GENERALIZED: Primary | ICD-10-CM

## 2021-02-24 DIAGNOSIS — Z90.49 HISTORY OF RESECTION OF SMALL BOWEL: ICD-10-CM

## 2021-02-24 LAB
ALBUMIN SERPL-MCNC: 3.5 G/DL (ref 3.4–5)
ALBUMIN/GLOB SERPL: 0.8 {RATIO} (ref 0.8–1.7)
ALP SERPL-CCNC: 143 U/L (ref 45–117)
ALT SERPL-CCNC: 30 U/L (ref 13–56)
ANION GAP SERPL CALC-SCNC: 4 MMOL/L (ref 3–18)
APPEARANCE UR: CLEAR
AST SERPL-CCNC: 14 U/L (ref 10–38)
BASOPHILS # BLD: 0 K/UL (ref 0–0.1)
BASOPHILS NFR BLD: 0 % (ref 0–2)
BILIRUB SERPL-MCNC: 0.3 MG/DL (ref 0.2–1)
BILIRUB UR QL: NEGATIVE
BUN SERPL-MCNC: 15 MG/DL (ref 7–18)
BUN/CREAT SERPL: 15 (ref 12–20)
CALCIUM SERPL-MCNC: 9.9 MG/DL (ref 8.5–10.1)
CHLORIDE SERPL-SCNC: 104 MMOL/L (ref 100–111)
CO2 SERPL-SCNC: 31 MMOL/L (ref 21–32)
COLOR UR: YELLOW
CREAT SERPL-MCNC: 1.03 MG/DL (ref 0.6–1.3)
DIFFERENTIAL METHOD BLD: ABNORMAL
EOSINOPHIL # BLD: 0.1 K/UL (ref 0–0.4)
EOSINOPHIL NFR BLD: 2 % (ref 0–5)
ERYTHROCYTE [DISTWIDTH] IN BLOOD BY AUTOMATED COUNT: 15.1 % (ref 11.6–14.5)
GLOBULIN SER CALC-MCNC: 4.5 G/DL (ref 2–4)
GLUCOSE SERPL-MCNC: 106 MG/DL (ref 74–99)
GLUCOSE UR STRIP.AUTO-MCNC: >1000 MG/DL
HCT VFR BLD AUTO: 39.9 % (ref 35–45)
HGB BLD-MCNC: 12.1 G/DL (ref 12–16)
HGB UR QL STRIP: NEGATIVE
KETONES UR QL STRIP.AUTO: NEGATIVE MG/DL
LEUKOCYTE ESTERASE UR QL STRIP.AUTO: NEGATIVE
LIPASE SERPL-CCNC: 176 U/L (ref 73–393)
LYMPHOCYTES # BLD: 2.2 K/UL (ref 0.9–3.6)
LYMPHOCYTES NFR BLD: 33 % (ref 21–52)
MCH RBC QN AUTO: 25.5 PG (ref 24–34)
MCHC RBC AUTO-ENTMCNC: 30.3 G/DL (ref 31–37)
MCV RBC AUTO: 84 FL (ref 74–97)
MONOCYTES # BLD: 0.6 K/UL (ref 0.05–1.2)
MONOCYTES NFR BLD: 9 % (ref 3–10)
NEUTS SEG # BLD: 3.8 K/UL (ref 1.8–8)
NEUTS SEG NFR BLD: 56 % (ref 40–73)
NITRITE UR QL STRIP.AUTO: NEGATIVE
PH UR STRIP: 5 [PH] (ref 5–8)
PLATELET # BLD AUTO: 323 K/UL (ref 135–420)
PMV BLD AUTO: 10.6 FL (ref 9.2–11.8)
POTASSIUM SERPL-SCNC: 3.9 MMOL/L (ref 3.5–5.5)
PROT SERPL-MCNC: 8 G/DL (ref 6.4–8.2)
PROT UR STRIP-MCNC: NEGATIVE MG/DL
RBC # BLD AUTO: 4.75 M/UL (ref 4.2–5.3)
SODIUM SERPL-SCNC: 139 MMOL/L (ref 136–145)
SP GR UR REFRACTOMETRY: 1.03 (ref 1–1.03)
UROBILINOGEN UR QL STRIP.AUTO: 0.2 EU/DL (ref 0.2–1)
WBC # BLD AUTO: 6.7 K/UL (ref 4.6–13.2)

## 2021-02-24 PROCEDURE — 74177 CT ABD & PELVIS W/CONTRAST: CPT

## 2021-02-24 PROCEDURE — 96374 THER/PROPH/DIAG INJ IV PUSH: CPT

## 2021-02-24 PROCEDURE — 96376 TX/PRO/DX INJ SAME DRUG ADON: CPT

## 2021-02-24 PROCEDURE — 96375 TX/PRO/DX INJ NEW DRUG ADDON: CPT

## 2021-02-24 PROCEDURE — 99284 EMERGENCY DEPT VISIT MOD MDM: CPT

## 2021-02-24 PROCEDURE — 74011000636 HC RX REV CODE- 636: Performed by: STUDENT IN AN ORGANIZED HEALTH CARE EDUCATION/TRAINING PROGRAM

## 2021-02-24 PROCEDURE — 81003 URINALYSIS AUTO W/O SCOPE: CPT

## 2021-02-24 PROCEDURE — 96361 HYDRATE IV INFUSION ADD-ON: CPT

## 2021-02-24 PROCEDURE — 83690 ASSAY OF LIPASE: CPT

## 2021-02-24 PROCEDURE — 74011250636 HC RX REV CODE- 250/636: Performed by: STUDENT IN AN ORGANIZED HEALTH CARE EDUCATION/TRAINING PROGRAM

## 2021-02-24 PROCEDURE — 80053 COMPREHEN METABOLIC PANEL: CPT

## 2021-02-24 PROCEDURE — 85025 COMPLETE CBC W/AUTO DIFF WBC: CPT

## 2021-02-24 RX ORDER — ONDANSETRON 2 MG/ML
4 INJECTION INTRAMUSCULAR; INTRAVENOUS
Status: COMPLETED | OUTPATIENT
Start: 2021-02-24 | End: 2021-02-24

## 2021-02-24 RX ORDER — MORPHINE SULFATE 4 MG/ML
4 INJECTION, SOLUTION INTRAMUSCULAR; INTRAVENOUS
Status: COMPLETED | OUTPATIENT
Start: 2021-02-24 | End: 2021-02-24

## 2021-02-24 RX ADMIN — SODIUM CHLORIDE 1000 ML: 900 INJECTION, SOLUTION INTRAVENOUS at 22:30

## 2021-02-24 RX ADMIN — ONDANSETRON 4 MG: 2 INJECTION INTRAMUSCULAR; INTRAVENOUS at 22:30

## 2021-02-24 RX ADMIN — MORPHINE SULFATE 4 MG: 4 INJECTION, SOLUTION INTRAMUSCULAR; INTRAVENOUS at 22:30

## 2021-02-24 RX ADMIN — IOPAMIDOL 95 ML: 612 INJECTION, SOLUTION INTRAVENOUS at 22:52

## 2021-02-24 NOTE — TELEPHONE ENCOUNTER
Pt. Is requesting a vv appointment for Dr. Santillan to fill out paperwork for her to continue to work from home due to her medical condition. She is also requesting a letter stating that as well. Please assist.

## 2021-02-24 NOTE — ED TRIAGE NOTES
Pt reports right sided abdominal pain along with nausea vomiting, diarrhea and feeling weak since Monday.

## 2021-02-25 ENCOUNTER — PATIENT OUTREACH (OUTPATIENT)
Dept: CASE MANAGEMENT | Age: 61
End: 2021-02-25

## 2021-02-25 VITALS
WEIGHT: 145 LBS | OXYGEN SATURATION: 100 % | SYSTOLIC BLOOD PRESSURE: 119 MMHG | HEIGHT: 64 IN | BODY MASS INDEX: 24.75 KG/M2 | RESPIRATION RATE: 18 BRPM | HEART RATE: 95 BPM | DIASTOLIC BLOOD PRESSURE: 74 MMHG | TEMPERATURE: 98.8 F

## 2021-02-25 PROCEDURE — 74011250636 HC RX REV CODE- 250/636: Performed by: EMERGENCY MEDICINE

## 2021-02-25 RX ORDER — MORPHINE SULFATE 4 MG/ML
4 INJECTION, SOLUTION INTRAMUSCULAR; INTRAVENOUS
Status: COMPLETED | OUTPATIENT
Start: 2021-02-25 | End: 2021-02-25

## 2021-02-25 RX ORDER — PROMETHAZINE HYDROCHLORIDE 25 MG/1
25 TABLET ORAL
Qty: 12 TAB | Refills: 0 | Status: SHIPPED | OUTPATIENT
Start: 2021-02-25 | End: 2021-03-16 | Stop reason: SDUPTHER

## 2021-02-25 RX ADMIN — MORPHINE SULFATE 4 MG: 4 INJECTION, SOLUTION INTRAMUSCULAR; INTRAVENOUS at 00:13

## 2021-02-25 NOTE — DISCHARGE INSTRUCTIONS
1.  You can try the Phenergan as needed for nausea or vomiting. You should not take it with the Zofran but it may be helpful. 2.  Return if symptoms worsen or change in any way in the meantime. Follow-up with your surgeon and primary care doctor tomorrow for follow-up appointment.

## 2021-02-25 NOTE — TELEPHONE ENCOUNTER
Pt is checking the status of her request. Pt was informed regarding the turnaround time. Pt then stated that she will drop off her paperwork and would still like to get a call back.

## 2021-02-25 NOTE — ED PROVIDER NOTES
24-year-old female with past medical history significant for cholecystectomy and small bowel obstruction status post ex-lap with small bowel resection presents to the ED with complaint of progressively worsening right lower quadrant abdominal pain, distention, nausea and vomiting over the past 3 days. Since yesterday she also started to develop diarrhea. She is not passing flatus but had an episode of diarrhea earlier today before coming to the hospital.  She describes her abdominal pain is a constant dull sensation with intermittent sharp worsening. Nothing seems to make the pain better. She felt this way before prior small bowel obstructions in the past.  She did not take any pain medication prior to arrival.  She has not been able to eat anything since the pain started. She denies any fever, chills, chest pain, shortness of breath or any other complaints. She denies smoking, drinking or recreational drug use.            Past Medical History:   Diagnosis Date    Abdominal adhesions     Adnexal cyst 7/30/2019    Left    Anemia     Asthma     Chronic pelvic pain in female 7/30/2019    Diabetes mellitus     Dyskinesia     bilateral    Essential hypertension     GERD (gastroesophageal reflux disease)     Hypertension     Menopause     Microhematuria     Rheumatoid arteritis ([facility] [address] 2018    Stool color black        Past Surgical History:   Procedure Laterality Date    COLONOSCOPY N/A 1/21/2019    COLONOS[facility] performed by Dwayne Garvin MD at [facility] ENDOSCOPY    HX CHOLECYSTECTOMY  6/28/10    HX COLONOSCOPY      HX ENDOSCOPY      HX HERNIA REPAIR      HX HYSTERECTOMY  1989    Fibroids    HX KNEE REPLACEMENT Left     HX KNEE REPLACEMENT Right 06/2018    HX OOPHORECTOMY  12/2000    HX ORTHOPAEDIC Right     ankle- multiple surgeries    HX SMALL BOWEL RESECTION  12/2000    HX SMALL BOWEL RESECTION  02/21/2017    Dr. Taiwo Montoya           Family History:   Problem Relation Age of Onset    Hypertension Other         parent    Breast Cancer Other 21    Heart Disease Father     Diabetes Father     Lung Disease Father     Diabetes Mother     Hypertension Other         sibling    Diabetes Other         parent    Kidney Disease Brother     Diabetes Brother     Lung Disease Brother        Social History     Socioeconomic History    Marital status:      Spouse name: Not on file    Number of children: Not on file    Years of education: Not on file    Highest education level: Not on file   Occupational History    Not on file   Social Needs    Financial resource strain: Not on file    Food insecurity     Worry: Not on file     Inability: Not on file    Transportation needs     Medical: Not on file     Non-medical: Not on file   Tobacco Use    Smoking status: Never Smoker    Smokeless tobacco: Never Used   Substance and Sexual Activity    Alcohol use: No    Drug use: No    Sexual activity: Yes     Partners: Male     Birth control/protection: None   Lifestyle    Physical activity     Days per week: Not on file     Minutes per session: Not on file    Stress: Not on file   Relationships    Social connections     Talks on phone: Not on file     Gets together: Not on file     Attends Jehovah's witness service: Not on file     Active member of club or organization: Not on file     Attends meetings of clubs or organizations: Not on file     Relationship status: Not on file    Intimate partner violence     Fear of current or ex partner: Not on file     Emotionally abused: Not on file     Physically abused: Not on file     Forced sexual activity: Not on file   Other Topics Concern    Not on file   Social History Narrative    Not on file         ALLERGIES: Macrodantin [nitrofurantoin macrocrystalline] and Tape [adhesive]    Review of Systems   Constitutional: Negative for activity change and appetite change. HENT: Negative for drooling and facial swelling.     Eyes: Negative for pain and discharge. Respiratory: Negative for apnea and choking. Cardiovascular: Negative for palpitations and leg swelling. Gastrointestinal: Positive for abdominal pain, diarrhea, nausea and vomiting. Negative for blood in stool and rectal pain. Endocrine: Negative for polydipsia and polyphagia. Genitourinary: Negative for genital sores and hematuria. Musculoskeletal: Negative for gait problem and neck stiffness. Skin: Negative for color change and rash. Allergic/Immunologic: Negative for environmental allergies. Neurological: Negative for tremors. Hematological: Negative for adenopathy. Psychiatric/Behavioral: Negative for agitation and behavioral problems. Vitals:    02/24/21 1839   BP: 132/81   Pulse: 95   Resp: 18   Temp: 98.8 °F (37.1 °C)   SpO2: 98%   Weight: 65.8 kg (145 lb)   Height: 5' 4\" (1.626 m)            Physical Exam  Vitals signs and nursing note reviewed. Constitutional:       Appearance: Normal appearance. HENT:      Head: Normocephalic and atraumatic. Eyes:      Extraocular Movements: Extraocular movements intact. Pupils: Pupils are equal, round, and reactive to light. Cardiovascular:      Rate and Rhythm: Normal rate and regular rhythm. Heart sounds: Normal heart sounds. No murmur. No friction rub. Pulmonary:      Effort: Pulmonary effort is normal.      Breath sounds: Normal breath sounds. Abdominal:      Palpations: Abdomen is soft. Comments: Well-healed midline surgical scar and right upper quadrant surgical scars present. Abdomen distended with significant tenderness to palpation and guarding in the right lower quadrant and right periumbilical area. Musculoskeletal: Normal range of motion. Skin:     General: Skin is warm and dry. Capillary Refill: Capillary refill takes less than 2 seconds. Neurological:      General: No focal deficit present.       Mental Status: She is alert and oriented to person, place, and time.   Psychiatric:         Mood and Affect: Mood normal.          MDM  Number of Diagnoses or Management Options  Abdominal pain, generalized  History of resection of small bowel  Diagnosis management comments: 60-year-old female with past medical history significant for multiple abdominal surgeries and small bowel obstruction presents to the ED with worsening abdominal distention, pain, vomiting, diarrhea and is not passing flatus. Concern for recurrent small bowel obstruction or other intra-abdominal pathology. Will obtain full laboratory work-up and CT of the abdomen pelvis to assess for any acute pathology. IV fluid bolus. Symptomatic control with morphine and Zofran. Will reassess. Laboratory work-up unremarkable. UA negative for UTI. Patient signed out to Dr. Tai Mazariegos at [phone] pending completion of CT imaging and reassessment.     Gilbert Saunders,            Procedures

## 2021-02-25 NOTE — ED NOTES
Went t to start iv fluids and give meds but her iv blew, tried x2 on other arm however no success. Tech is trying now

## 2021-02-25 NOTE — ED NOTES
[address] HANDOFF      Patient was turned over to me at the regular change of shift by Dr. Flaca Manjarrez, at 2200. Patient with history of small bowel obstruction presenting with abdominal pain going on for the past few days. Currently waiting results of the CT scan. Plan for this patient is: Follow-up on CT scan result. Recent Results (from the past 12 hour(s))   URINALYSIS W/ RFLX MICROSCOPIC    Collection Time: 02/24/21  6:42 PM   Result Value Ref Range    Color YELLOW      Appearance CLEAR      Specific gravity 1.026 1.005 - 1.030      pH (UA) 5.0 5.0 - 8.0      Protein Negative NEG mg/dL    Glucose >1,000 (A) NEG mg/dL    Ketone Negative NEG mg/dL    Bilirubin Negative NEG      Blood Negative NEG      Urobilinogen 0.2 0.2 - 1.0 EU/dL    Nitrites Negative NEG      Leukocyte Esterase Negative NEG     CBC WITH AUTOMATED DIFF    Collection Time: 02/24/21  8:38 PM   Result Value Ref Range    WBC 6.7 4.6 - 13.2 K/uL    RBC 4.75 4.20 - 5.30 M/uL    HGB 12.1 12.0 - 16.0 g/dL    HCT 39.9 35.0 - 45.0 %    MCV 84.0 74.0 - 97.0 FL    MCH 25.5 24.0 - 34.0 PG    MCHC 30.3 (L) 31.0 - 37.0 g/dL    RDW 15.1 (H) 11.6 - 14.5 %    PLATELET 273 592 - 730 K/uL    MPV 10.6 9.2 - 11.8 FL    NEUTROPHILS 56 40 - 73 %    LYMPHOCYTES 33 21 - 52 %    MONOCYTES 9 3 - 10 %    EOSINOPHILS 2 0 - 5 %    BASOPHILS 0 0 - 2 %    ABS. NEUTROPHILS 3.8 1.8 - 8.0 K/UL    ABS. LYMPHOCYTES 2.2 0.9 - 3.6 K/UL    ABS. MONOCYTES 0.6 0.05 - 1.2 K/UL    ABS. EOSINOPHILS 0.1 0.0 - 0.4 K/UL    ABS.  BASOPHILS 0.0 0.0 - 0.1 K/UL    DF AUTOMATED     METABOLIC PANEL, COMPREHENSIVE    Collection Time: 02/24/21  8:38 PM   Result Value Ref Range    Sodium 139 136 - 145 mmol/L    Potassium 3.9 3.5 - 5.5 mmol/L    Chloride 104 100 - 111 mmol/L    CO2 31 21 - 32 mmol/L    Anion gap 4 3.0 - 18 mmol/L    Glucose 106 (H) 74 - 99 mg/dL    BUN 15 7.0 - 18 MG/DL    Creatinine 1.03 0.6 - 1.3 MG/DL    BUN/Creatinine ratio 15 12 - 20      GFR est AA >60 >60 ml/min/1.73m2    GFR est non-AA 54 (L) >60 ml/min/1.73m2    Calcium 9.9 8.5 - 10.1 MG/DL    Bilirubin, total 0.3 0.2 - 1.0 MG/DL    ALT (SGPT) 30 13 - 56 U/L    AST (SGOT) 14 10 - 38 U/L    Alk. phosphatase 143 (H) 45 - 117 U/L    Protein, total 8.0 6.4 - 8.2 g/dL    Albumin 3.5 3.4 - 5.0 g/dL    Globulin 4.5 (H) 2.0 - 4.0 g/dL    A-G Ratio 0.8 0.8 - 1.7     LIPASE    Collection Time: 02/24/21  8:38 PM   Result Value Ref Range    Lipase 176 73 - 393 U/L       CT ABD PELV W CONT   Final Result      Redemonstrated low midline bowel anastomosis with regional soft tissue stranding   and small amount of fluid in the laparotomy wound, however, no bowel obstruction   or other concerning abnormality. _______________                            Medications   sodium chloride 0.9 % bolus infusion 1,000 mL (0 mL IntraVENous IV Completed 2/25/21 0016)   ondansetron (ZOFRAN) injection 4 mg (4 mg IntraVENous Given 2/24/21 2230)   morphine injection 4 mg (4 mg IntraVENous Given 2/24/21 2230)   iopamidoL (ISOVUE 300) 61 % contrast injection 100 mL (95 mL IntraVENous Given 2/24/21 2252)   morphine injection 4 mg (4 mg IntraVENous Given 2/25/21 0013)         Course:   I reviewed the patient's CT result as well as her lab results. Patient feeling better. No signs of obstruction, she has some chronic inflammation seen on prior scans. She is followed by Dr. Peng Warren, of the general surgery service. I recommended she call him for follow-up but certainly return if symptoms are worsening or changing in the meantime. The patient expresses understanding. She would like to try Phenergan we will give a prescription for this. Advised to return if symptoms are worsening or changing and she is in agreement. She will call Dr. Brandt Schmitt tomorrow to arrange a follow-up appointment. At this time, patient is stable and appropriate for discharge home.   Patient demonstrates understanding of current diagnoses and is in agreement with the treatment plan. They are advised that while the likelihood of serious underlying condition is low at this point given the evaluation performed today, we cannot fully rule it out. They are advised to immediately return with any new symptoms or worsening of current condition. All questions have been answered. Patient is given educational material regarding their diagnoses, including danger symptoms and when to return to the ED. This note was dictated utilizing Dragon voice recognition software. Unfortunately this leads to occasional typographical errors. I apologize in advance if the situation occurs. If questions occur please do not hesitate to contact me directly. Geremias Diaz DO        Diagnosis:   1. Abdominal pain, generalized    2. History of resection of small bowel          Disposition: Discharge    Follow-up Information     Follow up With Specialties Details Why Contact Info    Brent Harden MD Family Medicine, Internal Medicine In 2 days  [address] [facility]  [address] [place] [address] [facility]      Doris Stuart MD Surgery In 1 day  [address] [id number] [phone]      [facility] EMERGENCY DEPT Emergency Medicine  If symptoms worsen, As needed [address]  [phone]          Patient's Medications   Start Taking    PROMETHAZINE (PHENERGAN) 25 MG TABLET    Take 1 Tab by mouth every six (6) hours as needed for Nausea. Continue Taking    ALBUTEROL (PROVENTIL VENTOLIN) 2.5 MG /3 ML (0.083 %) NEBU    3 mL by Nebulization route every six (6) hours as needed for Wheezing. ALBUTEROL SULFATE (PROAIR RESPICLICK) 90 MCG/ACTUATION BREATH ACTIVATED INHALER    Take 2 Puffs by inhalation every four (4) hours as needed for Wheezing, Shortness of Breath or Cough. AMLODIPINE (NORVASC) 5 MG TABLET    Take 1 Tab by mouth daily.     BISOPROLOL-HYDROCHLOROTHIAZIDE (ZIAC) 2.5-6.25 MG PER TABLET    take 1 tablet by mouth once daily    DICYCLOMINE (BENTYL) 10 MG/5 ML SOLN ORAL SOLUTION    Take 10 mL by mouth four (4) times daily. DOXEPIN (SINEQUAN) 50 MG CAPSULE    Take 1 Cap by mouth nightly. DULAGLUTIDE (TRULICITY) 1.43 FD/7.4 ML SUB-Q PEN    0.5 mL by SubCUTAneous route every seven (7) days. FARXIGA 10 MG TAB TABLET    take 1 tablet by mouth once daily    GLUCOSE BLOOD VI TEST STRIPS (ONETOUCH ULTRA TEST) STRIP    Check blood glucose as directed    INSULIN NEEDLES, DISPOSABLE, (JESSA PEN NEEDLE) 32 GAUGE X 5/32\" NDLE    Give injection once a week    MULTIVITAMIN (ONE A DAY) TABLET    Take 1 Tab by mouth daily. OMEPRAZOLE (PRILOSEC) 10 MG CAPSULE    take 1 capsule by mouth once daily    ONDANSETRON (ZOFRAN ODT) 4 MG DISINTEGRATING TABLET    Take 1 tablets every 6-8 hours as needed for nausea and vomiting. PROMETHAZINE (PHENERGAN) 25 MG SUPPOSITORY    Insert 1-2 Suppositories into rectum every six (6) hours as needed for Nausea.    These Medications have changed    No medications on file   Stop Taking    No medications on file

## 2021-02-25 NOTE — PROGRESS NOTES
.  Patient contacted regarding Dorothy Corbett. Discussed COVID-19 related testing which was not done at this time. Test results were not done. Patient informed of results, if available? no     Care Transition Nurse/ Ambulatory Care Manager contacted the patient by telephone to perform post discharge assessment. Call within 2 business days of discharge: Yes Verified name and  with patient as identifiers. Provided introduction to self, and explanation of the CTN/ACM role, and reason for call due to risk factors for infection and/or exposure to COVID-19. Symptoms reviewed with patient who verbalized the following symptoms: no new symptoms and no worsening symptoms          Future Appointments   Date Time Provider Jonatan Roseline   3/8/2021  9:00 AM Kalyan Fong MD [facility] AMB   2021  8:45 AM Lorenzo Santillan MD [facility] AMB        Advance Care Planning:   Does patient have an Advance Directive:  not on file. Patient has following risk factors of: diabetes. CTN/ACM reviewed discharge instructions, medical action plan and red flags such as increased shortness of breath, increasing fever and signs of decompensation with patient who verbalized understanding. Discussed exposure protocols and quarantine with CDC Guidelines What to do if you are sick with coronavirus disease .  Patient was given an opportunity for questions and concerns. The patient agrees to contact the [facility] exposure line [phone], [facility]  [phone] and PCP office for questions related to their healthcare. CTN/ACM provided contact information for future needs.     Reviewed and educated patient on any new and changed medications related to discharge diagnosis     Patient/family/caregiver given information for GetWell Loop and agrees to enroll no  Patient's preferred e-mail: n/a   Patient's preferred phone number: n/a  Based on Loop alert triggers, patient will be contacted by nurse care manager for worsening symptoms. Plan for follow-up call in 5-7 days based on severity of symptoms and risk factors. Seen in [facility] ED 2/24/21 abdomiannl pain headche  diarreah. S/P colon resection. Patient voicing she is feeling much better.

## 2021-03-03 ENCOUNTER — TELEPHONE (OUTPATIENT)
Dept: FAMILY MEDICINE CLINIC | Age: 61
End: 2021-03-03

## 2021-03-03 NOTE — TELEPHONE ENCOUNTER
Pt stated that she dropped off a ADA form and stated that she will pick it up from the office and to not fax it again. Asking to be called back when its ready for pickup.

## 2021-03-04 ENCOUNTER — PATIENT OUTREACH (OUTPATIENT)
Dept: CASE MANAGEMENT | Age: 61
End: 2021-03-04

## 2021-03-04 NOTE — PROGRESS NOTES
ACM call patient to follow up ED TESSA. She is feeling much better and have a PCP f/u appointment on 3/8/2021. Did go to the office to have ADA from completed and she has picked it up.

## 2021-03-08 ENCOUNTER — VIRTUAL VISIT (OUTPATIENT)
Dept: FAMILY MEDICINE CLINIC | Age: 61
End: 2021-03-08
Payer: COMMERCIAL

## 2021-03-08 DIAGNOSIS — E11.65 TYPE 2 DIABETES MELLITUS WITH HYPERGLYCEMIA, WITHOUT LONG-TERM CURRENT USE OF INSULIN (HCC): ICD-10-CM

## 2021-03-08 DIAGNOSIS — Z23 HIGH PRIORITY FOR COVID-19 VIRUS VACCINATION: Primary | ICD-10-CM

## 2021-03-08 DIAGNOSIS — I10 ESSENTIAL HYPERTENSION, BENIGN: ICD-10-CM

## 2021-03-08 DIAGNOSIS — F41.9 ANXIETY: ICD-10-CM

## 2021-03-08 DIAGNOSIS — F33.9 RECURRENT DEPRESSION (HCC): ICD-10-CM

## 2021-03-08 PROCEDURE — 99214 OFFICE O/P EST MOD 30 MIN: CPT | Performed by: FAMILY MEDICINE

## 2021-03-08 NOTE — PROGRESS NOTES
Trung Paci presents today for   Chief Complaint   Patient presents with    Hypertension    Diabetes    Asthma       Is someone accompanying this pt? na    Is the patient using any DME equipment during OV? na    Depression Screening:  3 most recent PHQ Screens 1/27/2021   Little interest or pleasure in doing things Not at all   Feeling down, depressed, irritable, or hopeless Not at all   Total Score PHQ 2 0       Learning Assessment:  Learning Assessment 1/27/2021   PRIMARY LEARNER Patient   HIGHEST LEVEL OF EDUCATION - PRIMARY LEARNER  -   BARRIERS PRIMARY LEARNER -   CO-LEARNER CAREGIVER -   PRIMARY LANGUAGE ENGLISH   LEARNER PREFERENCE PRIMARY DEMONSTRATION   ANSWERED BY patient   RELATIONSHIP SELF       Abuse Screening:  Abuse Screening Questionnaire 10/14/2020   Do you ever feel afraid of your partner? N   Are you in a relationship with someone who physically or mentally threatens you? N   Is it safe for you to go home? Y       Fall Risk  Fall Risk Assessment, last 12 mths 10/14/2020   Able to walk? Yes   Fall in past 12 months? No   Number of falls in past 12 months -   Fall with injury? -       Health Maintenance reviewed and discussed and ordered per Provider. Health Maintenance Due   Topic Date Due    COVID-19 Vaccine (1 of 2) Never done    DTaP/Tdap/Td series (1 - Tdap) Never done    Shingrix Vaccine Age 50> (1 of 2) Never done    Eye Exam Retinal or Dilated  05/29/2019   . Coordination of Care:  1. Have you been to the ER, urgent care clinic since your last visit? Hospitalized since your last visit? Yes, 2/24/21, [facility], headache, and diarrhea    2. Have you seen or consulted any other health care providers outside of the [address] [place] [address] since your last visit? Include any pap smears or colon screening.  no      Last  Checked na  Last UDS Checked na  Last Pain contract signed: na    Patients concerns today:  No concerns today

## 2021-03-08 NOTE — PROGRESS NOTES
Salome Casanova is a 64 y.o.  female and presents with    Chief Complaint   Patient presents with    Hypertension    Diabetes    Asthma     Salome Casanova, who was evaluated through a synchronous (real-time) audio-video encounter, and/or her healthcare decision maker, is aware that it is a billable service, with coverage as determined by her insurance carrier. She provided verbal consent to proceed: Yes, and patient identification was verified. This visit was conducted pursuant to the emergency declaration under the [address] [place] [address] and the [employer]. A caregiver was present when appropriate. Ability to conduct physical exam was limited. The patient was located in a state where the provider was credentialed to provide care. --Alice Pandey MD on 3/8/2021 at 9:04 AM    Subjective:  Pt is following up after ER visit for abdominal pain. She has history of multiple abdominal surgeries with resulting adhesions and small bowel obstruction. She had first COVID vaccine given 2 weeks prior to her presenting to ER. She had been vomiting and having diarrhea and was dehydrated. She was told that her symptoms were due to the vaccine. She has multiple medical conditions which put her at increased risk for covid complications. Diabetes Mellitus:  She has diabetes mellitus, and  Hyperlipidemia.  Her fasting blood glucose this morning was 90; trulicity has been effective and her weight loss and glucose control. Diabetic ROS - medication compliance: compliant most of the time, diabetic diet compliance: noncompliant some of the time. Lab review: labs reviewed, I note that glycosylated hemoglobin is due in 1 month.     Depression Review:  Patient is seen for followup of depression. Treatment includes none and no other therapies.    Ongoing symptoms include depressed mood, anhedonia, insomnia, fatigue, feelings of worthlessness/guilt, difficulty concentrating and hopelessness. She denies hypersomnia, impaired memory and recurrent thoughts of death.   She experiences the following side effects from the treatment: none. Anxiety Review:  Patient is seen for sleep disturbance. Current treatment includes doxepin and no other therapies. Ongoing symptoms include: insomnia. Patient denies: palpitations, sweating, chest pain, shortness of breath, dizziness, paresthesias, racing thoughts, psychomotor agitation, feelings of losing control, difficulty concentrating, suicidal ideation. Reported side effects from the treatment: none. ROS   Constitutional: Negative for activity change and appetite change. HENT: Negative for drooling and facial swelling. Eyes: Negative for pain and discharge. Respiratory: Negative for apnea and choking. Cardiovascular: Negative for palpitations and leg swelling. Gastrointestinal: Positive for abdominal pain, diarrhea, nausea and vomiting. Negative for blood in stool and rectal pain. Endocrine: Negative for polydipsia and polyphagia. Genitourinary: Negative for genital sores and hematuria. Musculoskeletal: Negative for gait problem and neck stiffness. Skin: Negative for color change and rash. Allergic/Immunologic: Negative for environmental allergies. Neurological: Negative for tremors. Hematological: Negative for adenopathy. Psychiatric/Behavioral: Negative for agitation and behavioral problems. All other systems reviewed and are negative. Objective: There were no vitals filed for this visit.     alert, well appearing, and in no distress, oriented to person, place, and time and normal appearing weight  Mental status - normal mood, behavior, speech, dress, motor activity, and thought processes  Chest - normal work of breathing  Neurological - cranial nerves II through XII intact    LABS   Lipase normal  Cbc  cmp  TESTS      Assessment/Plan:    1. High priority for COVID-19 virus vaccination  Side effects from first vaccine; now improved; pt encouraged to have second and to use antiemetic after receiving dose    2. Type 2 diabetes mellitus with hyperglycemia, without long-term current use of insulin ([facility])  Goal hgb a1c <7; tolerating trulicity well; continue and recheck a1c in 1 month  - HEMOGLOBIN A1C WITH EAG; Future    3. Essential hypertension, benign  Goal <130/80    4. Recurrent depression ([facility] [address]  Mood improved    5. 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 I have discussed medication side effects and warnings with the patient as well. I have reviewed the plan of care with the patient, accepted their input and they are in agreement with the treatment goals.

## 2021-03-11 ENCOUNTER — PATIENT OUTREACH (OUTPATIENT)
Dept: CASE MANAGEMENT | Age: 61
End: 2021-03-11

## 2021-03-11 NOTE — PROGRESS NOTES
.  Patient has graduated from the Transitions of Care Coordination  program on 3/11/2021. Patient's symptoms are stable at this time. Patient/family has the ability to self-manage. Care management goals have been completed at this time. No further nurse navigator follow up scheduled. Pt has nurse navigator's contact information for any further questions, concerns, or needs.   Patients upcoming visits:    Future Appointments   Date Time Provider Jonatan Dukes   4/28/2021  8:45 AM Alexei Santillan MD [facility]

## 2021-03-13 NOTE — ED NOTES
Heat pack applied to R hip Notified patient that patient is due for an INR.   He will have INR tomorrow.    97.2

## 2021-03-16 ENCOUNTER — HOSPITAL ENCOUNTER (EMERGENCY)
Age: 61
Discharge: HOME | End: 2021-03-16
Attending: EMERGENCY MEDICINE
Payer: COMMERCIAL

## 2021-03-16 ENCOUNTER — APPOINTMENT (OUTPATIENT)
Dept: CT IMAGING | Age: 61
End: 2021-03-16
Attending: PHYSICIAN ASSISTANT
Payer: COMMERCIAL

## 2021-03-16 VITALS
WEIGHT: 145 LBS | RESPIRATION RATE: 18 BRPM | DIASTOLIC BLOOD PRESSURE: 88 MMHG | HEART RATE: 91 BPM | TEMPERATURE: 98.8 F | OXYGEN SATURATION: 100 % | HEIGHT: 64 IN | BODY MASS INDEX: 24.75 KG/M2 | SYSTOLIC BLOOD PRESSURE: 139 MMHG

## 2021-03-16 DIAGNOSIS — R11.2 NON-INTRACTABLE VOMITING WITH NAUSEA, UNSPECIFIED VOMITING TYPE: ICD-10-CM

## 2021-03-16 DIAGNOSIS — R10.9 ABDOMINAL CRAMPING: ICD-10-CM

## 2021-03-16 DIAGNOSIS — R10.84 ABDOMINAL PAIN, GENERALIZED: Primary | ICD-10-CM

## 2021-03-16 DIAGNOSIS — R19.7 DIARRHEA, UNSPECIFIED TYPE: ICD-10-CM

## 2021-03-16 LAB
ALBUMIN SERPL-MCNC: 3.7 G/DL (ref 3.4–5)
ALBUMIN/GLOB SERPL: 0.8 {RATIO} (ref 0.8–1.7)
ALP SERPL-CCNC: 171 U/L (ref 45–117)
ALT SERPL-CCNC: 26 U/L (ref 13–56)
ANION GAP SERPL CALC-SCNC: 5 MMOL/L (ref 3–18)
APPEARANCE UR: CLEAR
AST SERPL-CCNC: 17 U/L (ref 10–38)
BASOPHILS # BLD: 0 K/UL (ref 0–0.1)
BASOPHILS NFR BLD: 0 % (ref 0–2)
BILIRUB SERPL-MCNC: 0.3 MG/DL (ref 0.2–1)
BILIRUB UR QL: NEGATIVE
BUN SERPL-MCNC: 16 MG/DL (ref 7–18)
BUN/CREAT SERPL: 16 (ref 12–20)
CALCIUM SERPL-MCNC: 10 MG/DL (ref 8.5–10.1)
CHLORIDE SERPL-SCNC: 100 MMOL/L (ref 100–111)
CK MB CFR SERPL CALC: NORMAL % (ref 0–4)
CK MB SERPL-MCNC: <1 NG/ML (ref 5–25)
CK SERPL-CCNC: 69 U/L (ref 26–192)
CO2 SERPL-SCNC: 32 MMOL/L (ref 21–32)
COLOR UR: YELLOW
CREAT SERPL-MCNC: 0.98 MG/DL (ref 0.6–1.3)
DIFFERENTIAL METHOD BLD: ABNORMAL
EOSINOPHIL # BLD: 0.3 K/UL (ref 0–0.4)
EOSINOPHIL NFR BLD: 6 % (ref 0–5)
ERYTHROCYTE [DISTWIDTH] IN BLOOD BY AUTOMATED COUNT: 15 % (ref 11.6–14.5)
GLOBULIN SER CALC-MCNC: 4.5 G/DL (ref 2–4)
GLUCOSE SERPL-MCNC: 89 MG/DL (ref 74–99)
GLUCOSE UR STRIP.AUTO-MCNC: 500 MG/DL
HCT VFR BLD AUTO: 41.5 % (ref 35–45)
HGB BLD-MCNC: 12.6 G/DL (ref 12–16)
HGB UR QL STRIP: NEGATIVE
KETONES UR QL STRIP.AUTO: NEGATIVE MG/DL
LEUKOCYTE ESTERASE UR QL STRIP.AUTO: NEGATIVE
LIPASE SERPL-CCNC: 192 U/L (ref 73–393)
LYMPHOCYTES # BLD: 1.7 K/UL (ref 0.9–3.6)
LYMPHOCYTES NFR BLD: 35 % (ref 21–52)
MAGNESIUM SERPL-MCNC: 2.6 MG/DL (ref 1.6–2.6)
MCH RBC QN AUTO: 25.3 PG (ref 24–34)
MCHC RBC AUTO-ENTMCNC: 30.4 G/DL (ref 31–37)
MCV RBC AUTO: 83.2 FL (ref 74–97)
MONOCYTES # BLD: 0.4 K/UL (ref 0.05–1.2)
MONOCYTES NFR BLD: 8 % (ref 3–10)
NEUTS SEG # BLD: 2.4 K/UL (ref 1.8–8)
NEUTS SEG NFR BLD: 51 % (ref 40–73)
NITRITE UR QL STRIP.AUTO: NEGATIVE
PH UR STRIP: 6.5 [PH] (ref 5–8)
PLATELET # BLD AUTO: 374 K/UL (ref 135–420)
PMV BLD AUTO: 10.4 FL (ref 9.2–11.8)
POTASSIUM SERPL-SCNC: 3.5 MMOL/L (ref 3.5–5.5)
PROT SERPL-MCNC: 8.2 G/DL (ref 6.4–8.2)
PROT UR STRIP-MCNC: NEGATIVE MG/DL
RBC # BLD AUTO: 4.99 M/UL (ref 4.2–5.3)
SODIUM SERPL-SCNC: 137 MMOL/L (ref 136–145)
SP GR UR REFRACTOMETRY: 1.01 (ref 1–1.03)
TROPONIN I SERPL-MCNC: <0.02 NG/ML (ref 0–0.04)
UROBILINOGEN UR QL STRIP.AUTO: 0.2 EU/DL (ref 0.2–1)
WBC # BLD AUTO: 4.8 K/UL (ref 4.6–13.2)

## 2021-03-16 PROCEDURE — 81003 URINALYSIS AUTO W/O SCOPE: CPT

## 2021-03-16 PROCEDURE — 96375 TX/PRO/DX INJ NEW DRUG ADDON: CPT

## 2021-03-16 PROCEDURE — 74011000636 HC RX REV CODE- 636: Performed by: EMERGENCY MEDICINE

## 2021-03-16 PROCEDURE — 80053 COMPREHEN METABOLIC PANEL: CPT

## 2021-03-16 PROCEDURE — 99284 EMERGENCY DEPT VISIT MOD MDM: CPT

## 2021-03-16 PROCEDURE — 74011250636 HC RX REV CODE- 250/636: Performed by: EMERGENCY MEDICINE

## 2021-03-16 PROCEDURE — 74011250636 HC RX REV CODE- 250/636: Performed by: PHYSICIAN ASSISTANT

## 2021-03-16 PROCEDURE — 96374 THER/PROPH/DIAG INJ IV PUSH: CPT

## 2021-03-16 PROCEDURE — 85025 COMPLETE CBC W/AUTO DIFF WBC: CPT

## 2021-03-16 PROCEDURE — 96376 TX/PRO/DX INJ SAME DRUG ADON: CPT

## 2021-03-16 PROCEDURE — 84484 ASSAY OF TROPONIN QUANT: CPT

## 2021-03-16 PROCEDURE — 93005 ELECTROCARDIOGRAM TRACING: CPT

## 2021-03-16 PROCEDURE — 83735 ASSAY OF MAGNESIUM: CPT

## 2021-03-16 PROCEDURE — 74177 CT ABD & PELVIS W/CONTRAST: CPT

## 2021-03-16 PROCEDURE — 83690 ASSAY OF LIPASE: CPT

## 2021-03-16 RX ORDER — LOPERAMIDE HYDROCHLORIDE 2 MG/1
2 CAPSULE ORAL
Qty: 15 CAP | Refills: 0 | Status: SHIPPED | OUTPATIENT
Start: 2021-03-16 | End: 2021-03-21

## 2021-03-16 RX ORDER — ONDANSETRON 4 MG/1
TABLET, ORALLY DISINTEGRATING ORAL
Qty: 30 TAB | Refills: 1 | Status: SHIPPED | OUTPATIENT
Start: 2021-03-16

## 2021-03-16 RX ORDER — MORPHINE SULFATE 4 MG/ML
4 INJECTION, SOLUTION INTRAMUSCULAR; INTRAVENOUS
Status: COMPLETED | OUTPATIENT
Start: 2021-03-16 | End: 2021-03-16

## 2021-03-16 RX ORDER — MORPHINE SULFATE 2 MG/ML
2 INJECTION, SOLUTION INTRAMUSCULAR; INTRAVENOUS
Status: COMPLETED | OUTPATIENT
Start: 2021-03-16 | End: 2021-03-16

## 2021-03-16 RX ORDER — ONDANSETRON 2 MG/ML
4 INJECTION INTRAMUSCULAR; INTRAVENOUS
Status: COMPLETED | OUTPATIENT
Start: 2021-03-16 | End: 2021-03-16

## 2021-03-16 RX ORDER — PROMETHAZINE HYDROCHLORIDE 25 MG/1
25 TABLET ORAL
Qty: 20 TAB | Refills: 0 | Status: SHIPPED | OUTPATIENT
Start: 2021-03-16 | End: 2021-11-26

## 2021-03-16 RX ORDER — PROMETHAZINE HYDROCHLORIDE 25 MG/1
25-50 SUPPOSITORY RECTAL
Qty: 24 SUPPOSITORY | Refills: 0 | Status: SHIPPED | OUTPATIENT
Start: 2021-03-16 | End: 2021-11-26

## 2021-03-16 RX ADMIN — MORPHINE SULFATE 2 MG: 2 INJECTION, SOLUTION INTRAMUSCULAR; INTRAVENOUS at 18:09

## 2021-03-16 RX ADMIN — ONDANSETRON 4 MG: 2 INJECTION INTRAMUSCULAR; INTRAVENOUS at 18:51

## 2021-03-16 RX ADMIN — IOPAMIDOL 93 ML: 612 INJECTION, SOLUTION INTRAVENOUS at 19:04

## 2021-03-16 RX ADMIN — MORPHINE SULFATE 4 MG: 4 INJECTION, SOLUTION INTRAMUSCULAR; INTRAVENOUS at 19:43

## 2021-03-16 RX ADMIN — ONDANSETRON 4 MG: 2 INJECTION INTRAMUSCULAR; INTRAVENOUS at 19:43

## 2021-03-16 NOTE — ED PROVIDER NOTES
EMERGENCY DEPARTMENT HISTORY AND PHYSICAL EXAM      Date: 3/16/2021  Patient Name: Lissa Jimenez    History of Presenting Illness     Chief Complaint   Patient presents with    Abdominal Pain    Vomiting    Diarrhea       History Provided By: Patient    HPI: Lissa Jimenez, 64 y.o. female PMHx significant for anemia, asthma, dyskinesia, DM, htn, GERD, adnexal cyst, RA presents ambulatory to the ED with cc of intermittent sharp RLQ abd pain with associated nausea and nonbloody loose stool that began last night. Patient reports urinary hesitancy. Denies dysuria and hematuria. Denies history of kidney stones. Previous abdominal surgeries include hernia repair, cholecystectomy, hysterectomy, and small bowel resection. Pt has not taken anything for sx. Denies become SEB and dizziness. Denies cardiac history. There are no other complaints, changes, or physical findings at this time. PCP: Cori Vazquez MD    No current facility-administered medications on file prior to encounter. Current Outpatient Medications on File Prior to Encounter   Medication Sig Dispense Refill    promethazine (PHENERGAN) 25 mg tablet Take 1 Tab by mouth every six (6) hours as needed for Nausea. 12 Tab 0    dicyclomine (BENTYL) 10 mg/5 mL soln oral solution Take 10 mL by mouth four (4) times daily. 1 Bottle 0    Farxiga 10 mg tab tablet take 1 tablet by mouth once daily 90 Tab 3    ondansetron (Zofran ODT) 4 mg disintegrating tablet Take 1 tablets every 6-8 hours as needed for nausea and vomiting. 30 Tab 1    promethazine (PHENERGAN) 25 mg suppository Insert 1-2 Suppositories into rectum every six (6) hours as needed for Nausea. 24 Suppository 0    dulaglutide (Trulicity) 4.25 TI/0.3 mL sub-q pen 0.5 mL by SubCUTAneous route every seven (7) days.  3 Pen 1    Insulin Needles, Disposable, (Nia Pen Needle) 32 gauge x 5/32\" ndle Give injection once a week 30 Pen Needle 2    doxepin (SINEquan) 50 mg capsule Take 1 Cap by mouth nightly. 90 Cap 3    amLODIPine (NORVASC) 5 mg tablet Take 1 Tab by mouth daily. 90 Tab 3    albuterol sulfate (ProAir RespiClick) 90 mcg/actuation breath activated inhaler Take 2 Puffs by inhalation every four (4) hours as needed for Wheezing, Shortness of Breath or Cough. 1 Inhaler 5    albuterol (PROVENTIL VENTOLIN) 2.5 mg /3 mL (0.083 %) nebu 3 mL by Nebulization route every six (6) hours as needed for Wheezing. 30 Nebule 1    glucose blood VI test strips (OneTouch Ultra Test) strip Check blood glucose as directed 100 Strip 1    bisoprolol-hydroCHLOROthiazide (ZIAC) 2.5-6.25 mg per tablet take 1 tablet by mouth once daily 90 Tab 3    omeprazole (PRILOSEC) 10 mg capsule take 1 capsule by mouth once daily 90 Cap 4    multivitamin (ONE A DAY) tablet Take 1 Tab by mouth daily.          Past History     Past Medical History:  Past Medical History:   Diagnosis Date    Abdominal adhesions     Adnexal cyst 7/30/2019    Left    Anemia     Asthma     Chronic pelvic pain in female 7/30/2019    Diabetes mellitus     Dyskinesia     bilateral    Essential hypertension     GERD (gastroesophageal reflux disease)     Hypertension     Menopause     Microhematuria     Rheumatoid arteritis ([facility] [address] 2018    Stool color black        Past Surgical History:  Past Surgical History:   Procedure Laterality Date    COLONOSCOPY N/A 1/21/2019    COLONOSCOPY performed by Mendu, Willetta Frankel, MD at [facility] ENDOSCOPY    HX CHOLECYSTECTOMY  6/28/10    HX COLONOSCOPY      HX ENDOSCOPY      HX HERNIA REPAIR      HX HYSTERECTOMY  1989    Fibroids    HX KNEE REPLACEMENT Left     HX KNEE REPLACEMENT Right 06/2018    HX OOPHORECTOMY  12/2000    HX ORTHOPAEDIC Right     ankle- multiple surgeries    HX SMALL BOWEL RESECTION  12/2000    HX SMALL BOWEL RESECTION  02/21/2017    Dr. Roxanne Austin         Family History:  Family History   Problem Relation Age of Onset    Hypertension Other parent    Breast Cancer Other 21    Heart Disease Father     Diabetes Father     Lung Disease Father     Diabetes Mother     Hypertension Other         sibling    Diabetes Other         parent    Kidney Disease Brother     Diabetes Brother     Lung Disease Brother        Social History:  Social History     Tobacco Use    Smoking status: Never Smoker    Smokeless tobacco: Never Used   Substance Use Topics    Alcohol use: No    Drug use: No       Allergies: Allergies   Allergen Reactions    Macrodantin [Nitrofurantoin Macrocrystalline] Itching and Other (comments)    Tape [Adhesive] Other (comments)     Paper tape-- feels like burning skin  Burned skin when had wound vac         Review of Systems   Review of Systems   Constitutional: Negative for chills and fever. Respiratory: Negative for shortness of breath. Cardiovascular: Negative for chest pain. Gastrointestinal: Positive for abdominal pain, diarrhea and nausea. Negative for vomiting. Genitourinary: Negative for flank pain. Musculoskeletal: Negative for back pain and myalgias. Skin: Negative for color change, pallor, rash and wound. Neurological: Negative for dizziness, weakness and light-headedness. All other systems reviewed and are negative. Physical Exam   Physical Exam  Vitals signs and nursing note reviewed. Constitutional:       General: She is not in acute distress. Appearance: She is well-developed. Comments: Well-appearing in NAD   HENT:      Head: Normocephalic and atraumatic. Eyes:      Conjunctiva/sclera: Conjunctivae normal.   Cardiovascular:      Rate and Rhythm: Normal rate and regular rhythm. Heart sounds: Normal heart sounds. Pulmonary:      Effort: Pulmonary effort is normal. No respiratory distress. Breath sounds: Normal breath sounds. Abdominal:      General: Bowel sounds are normal. There is no distension. Palpations: Abdomen is soft. Tenderness:  There is abdominal tenderness in the right lower quadrant. Comments: Abdomen soft, nondistended  No guarding or rigidity   Musculoskeletal: Normal range of motion. Skin:     General: Skin is warm. Findings: No rash. Neurological:      Mental Status: She is alert and oriented to person, place, and time. Psychiatric:         Behavior: Behavior normal.         Diagnostic Study Results     Labs -   No results found for this or any previous visit (from the past 12 hour(s)). Radiologic Studies -   CT ABD PELV W CONT    (Results Pending)     CT Results  (Last 48 hours)    None        CXR Results  (Last 48 hours)    None          Medical Decision Making   I am the first provider for this patient. I reviewed the vital signs, available nursing notes, past medical history, past surgical history, family history and social history. Vital Signs-Reviewed the patient's vital signs. Patient Vitals for the past 12 hrs:   Temp Pulse Resp BP SpO2   03/16/21 1548 98.5 °F (36.9 °C) 90 20 (!) 145/96 100 %         Records Reviewed: Nursing Notes and Old Medical Records    Provider Notes (Medical Decision Making):   DDx: UTI, Pyelo, Kidney stone, Appendicitis, Colitis    63 yo F who presents with intermittent sharp right lower quadrant abdominal pain with associated nausea that began last night. History of cholecystectomy, small bowel resection and hysterectomy. On exam, RLQ tenderness. ED Course:   Initial assessment performed. The patients presenting problems have been discussed, and they are in agreement with the care plan formulated and outlined with them. I have encouraged them to ask questions as they arise throughout their visit. 1800  Transfer of care to attending, Dr Marjorie Talbert at shift change. Plan awaiting labs and CT scan. Attestations:    GUILLERMO Montiel    Please note that this dictation was completed with [employer], the [employer] voice recognition software.   Quite often unanticipated grammatical, syntax, homophones, and other interpretive errors are inadvertently transcribed by the computer software. Please disregard these errors. Please excuse any errors that have escaped final proofreading. Thank you.

## 2021-03-16 NOTE — ED TRIAGE NOTES
Pt arrives with c/o N/V/D and abdominal pain that woke her from sleep at 0200 this morning. Pt states she has taken some zofran without relief.

## 2021-03-16 NOTE — ED NOTES
Patient still in pain after receiving morphine.   Giving her zofran now for nausea  Will ask MD for more pain med

## 2021-03-16 NOTE — ED NOTES
1700  Patient her for abdominal pain lower right side. Has had bowel obstruction in past, she states this is different.      Ride home is

## 2021-03-17 ENCOUNTER — PATIENT OUTREACH (OUTPATIENT)
Dept: CASE MANAGEMENT | Age: 61
End: 2021-03-17

## 2021-03-17 LAB
ATRIAL RATE: 77 BPM
CALCULATED P AXIS, ECG09: 57 DEGREES
CALCULATED R AXIS, ECG10: 50 DEGREES
CALCULATED T AXIS, ECG11: 66 DEGREES
DIAGNOSIS, 93000: NORMAL
P-R INTERVAL, ECG05: 146 MS
Q-T INTERVAL, ECG07: 384 MS
QRS DURATION, ECG06: 70 MS
QTC CALCULATION (BEZET), ECG08: 434 MS
VENTRICULAR RATE, ECG03: 77 BPM

## 2021-03-17 NOTE — PROGRESS NOTES
Patient contacted regarding recent discharge and COVID-19 risk. Discussed COVID-19 related testing which was not done at this time. Test results were not done. Patient informed of results, if available? N/A    Outreach made within 2 business days of discharge: Yes    Care Transition Nurse/ Ambulatory Care Manager/ LPN Care Coordinator contacted the patient by telephone to perform post discharge assessment. No answer. Left HIPPA compliant message. Name, role and contact information provided. Requested return call. Will attempt to contact at a later time.

## 2021-03-17 NOTE — ED NOTES
Patient has been provided discharge instructions.  My Chart and [facility] has been discussed, and if any prescriptions were provided or called in, they have been reviewed with the patient, as well.  Allowed time for questions and clarification.

## 2021-03-18 ENCOUNTER — PATIENT OUTREACH (OUTPATIENT)
Dept: CASE MANAGEMENT | Age: 61
End: 2021-03-18

## 2021-03-18 NOTE — PROGRESS NOTES
Patient contacted regarding COVID-19  risk. Second attempt. No answer. Left HIPPA compliant message. Requested return call. Contact information provided. Episode of care resolved.

## 2021-03-25 ENCOUNTER — APPOINTMENT (OUTPATIENT)
Dept: CT IMAGING | Age: 61
End: 2021-03-25
Attending: EMERGENCY MEDICINE
Payer: COMMERCIAL

## 2021-03-25 ENCOUNTER — HOSPITAL ENCOUNTER (EMERGENCY)
Age: 61
Discharge: HOME | End: 2021-03-25
Attending: EMERGENCY MEDICINE
Payer: COMMERCIAL

## 2021-03-25 VITALS
SYSTOLIC BLOOD PRESSURE: 135 MMHG | HEART RATE: 93 BPM | DIASTOLIC BLOOD PRESSURE: 84 MMHG | OXYGEN SATURATION: 100 % | RESPIRATION RATE: 18 BRPM | TEMPERATURE: 97.6 F

## 2021-03-25 DIAGNOSIS — E86.0 MILD DEHYDRATION: ICD-10-CM

## 2021-03-25 DIAGNOSIS — Z87.19 HISTORY OF SMALL BOWEL OBSTRUCTION: ICD-10-CM

## 2021-03-25 DIAGNOSIS — R10.31 ABDOMINAL PAIN, RIGHT LOWER QUADRANT: Primary | ICD-10-CM

## 2021-03-25 LAB
ALBUMIN SERPL-MCNC: 3.8 G/DL (ref 3.4–5)
ALBUMIN/GLOB SERPL: 0.7 {RATIO} (ref 0.8–1.7)
ALP SERPL-CCNC: 188 U/L (ref 45–117)
ALT SERPL-CCNC: 27 U/L (ref 13–56)
ANION GAP SERPL CALC-SCNC: 2 MMOL/L (ref 3–18)
APPEARANCE UR: CLEAR
AST SERPL-CCNC: 18 U/L (ref 10–38)
BASOPHILS # BLD: 0 K/UL (ref 0–0.1)
BASOPHILS NFR BLD: 0 % (ref 0–2)
BILIRUB SERPL-MCNC: 0.3 MG/DL (ref 0.2–1)
BILIRUB UR QL: NEGATIVE
BUN SERPL-MCNC: 13 MG/DL (ref 7–18)
BUN/CREAT SERPL: 11 (ref 12–20)
CALCIUM SERPL-MCNC: 9.5 MG/DL (ref 8.5–10.1)
CHLORIDE SERPL-SCNC: 104 MMOL/L (ref 100–111)
CO2 SERPL-SCNC: 31 MMOL/L (ref 21–32)
COLOR UR: YELLOW
CREAT SERPL-MCNC: 1.22 MG/DL (ref 0.6–1.3)
DIFFERENTIAL METHOD BLD: ABNORMAL
EOSINOPHIL # BLD: 0.1 K/UL (ref 0–0.4)
EOSINOPHIL NFR BLD: 3 % (ref 0–5)
ERYTHROCYTE [DISTWIDTH] IN BLOOD BY AUTOMATED COUNT: 15 % (ref 11.6–14.5)
GLOBULIN SER CALC-MCNC: 5.2 G/DL (ref 2–4)
GLUCOSE SERPL-MCNC: 106 MG/DL (ref 74–99)
GLUCOSE UR STRIP.AUTO-MCNC: 500 MG/DL
HCT VFR BLD AUTO: 44.1 % (ref 35–45)
HGB BLD-MCNC: 13.3 G/DL (ref 12–16)
HGB UR QL STRIP: NEGATIVE
KETONES UR QL STRIP.AUTO: NEGATIVE MG/DL
LEUKOCYTE ESTERASE UR QL STRIP.AUTO: NEGATIVE
LIPASE SERPL-CCNC: 203 U/L (ref 73–393)
LYMPHOCYTES # BLD: 1.5 K/UL (ref 0.9–3.6)
LYMPHOCYTES NFR BLD: 32 % (ref 21–52)
MCH RBC QN AUTO: 25 PG (ref 24–34)
MCHC RBC AUTO-ENTMCNC: 30.2 G/DL (ref 31–37)
MCV RBC AUTO: 83.1 FL (ref 74–97)
MONOCYTES # BLD: 0.3 K/UL (ref 0.05–1.2)
MONOCYTES NFR BLD: 7 % (ref 3–10)
NEUTS SEG # BLD: 2.8 K/UL (ref 1.8–8)
NEUTS SEG NFR BLD: 58 % (ref 40–73)
NITRITE UR QL STRIP.AUTO: NEGATIVE
PH UR STRIP: 6.5 [PH] (ref 5–8)
PLATELET # BLD AUTO: 370 K/UL (ref 135–420)
PMV BLD AUTO: 10.7 FL (ref 9.2–11.8)
POTASSIUM SERPL-SCNC: 3.6 MMOL/L (ref 3.5–5.5)
PROT SERPL-MCNC: 9 G/DL (ref 6.4–8.2)
PROT UR STRIP-MCNC: NEGATIVE MG/DL
RBC # BLD AUTO: 5.31 M/UL (ref 4.2–5.3)
SODIUM SERPL-SCNC: 137 MMOL/L (ref 136–145)
SP GR UR REFRACTOMETRY: 1.01 (ref 1–1.03)
UROBILINOGEN UR QL STRIP.AUTO: 0.2 EU/DL (ref 0.2–1)
WBC # BLD AUTO: 4.8 K/UL (ref 4.6–13.2)

## 2021-03-25 PROCEDURE — 96375 TX/PRO/DX INJ NEW DRUG ADDON: CPT

## 2021-03-25 PROCEDURE — 74177 CT ABD & PELVIS W/CONTRAST: CPT

## 2021-03-25 PROCEDURE — 83690 ASSAY OF LIPASE: CPT

## 2021-03-25 PROCEDURE — 80053 COMPREHEN METABOLIC PANEL: CPT

## 2021-03-25 PROCEDURE — 74011250636 HC RX REV CODE- 250/636: Performed by: EMERGENCY MEDICINE

## 2021-03-25 PROCEDURE — 99282 EMERGENCY DEPT VISIT SF MDM: CPT

## 2021-03-25 PROCEDURE — 96374 THER/PROPH/DIAG INJ IV PUSH: CPT

## 2021-03-25 PROCEDURE — 85025 COMPLETE CBC W/AUTO DIFF WBC: CPT

## 2021-03-25 PROCEDURE — 96372 THER/PROPH/DIAG INJ SC/IM: CPT

## 2021-03-25 PROCEDURE — 74011000636 HC RX REV CODE- 636: Performed by: EMERGENCY MEDICINE

## 2021-03-25 PROCEDURE — 81003 URINALYSIS AUTO W/O SCOPE: CPT

## 2021-03-25 PROCEDURE — 96361 HYDRATE IV INFUSION ADD-ON: CPT

## 2021-03-25 PROCEDURE — 96376 TX/PRO/DX INJ SAME DRUG ADON: CPT

## 2021-03-25 RX ORDER — KETOROLAC TROMETHAMINE 30 MG/ML
15 INJECTION, SOLUTION INTRAMUSCULAR; INTRAVENOUS
Status: COMPLETED | OUTPATIENT
Start: 2021-03-25 | End: 2021-03-25

## 2021-03-25 RX ORDER — MORPHINE SULFATE 4 MG/ML
4 INJECTION, SOLUTION INTRAMUSCULAR; INTRAVENOUS
Status: DISCONTINUED | OUTPATIENT
Start: 2021-03-25 | End: 2021-03-25

## 2021-03-25 RX ORDER — MORPHINE SULFATE 2 MG/ML
2 INJECTION, SOLUTION INTRAMUSCULAR; INTRAVENOUS
Status: COMPLETED | OUTPATIENT
Start: 2021-03-25 | End: 2021-03-25

## 2021-03-25 RX ORDER — MORPHINE SULFATE 2 MG/ML
4 INJECTION, SOLUTION INTRAMUSCULAR; INTRAVENOUS ONCE
Status: COMPLETED | OUTPATIENT
Start: 2021-03-25 | End: 2021-03-25

## 2021-03-25 RX ORDER — MORPHINE SULFATE 4 MG/ML
4 INJECTION, SOLUTION INTRAMUSCULAR; INTRAVENOUS ONCE
Status: DISCONTINUED | OUTPATIENT
Start: 2021-03-25 | End: 2021-03-25

## 2021-03-25 RX ORDER — PROMETHAZINE HYDROCHLORIDE 25 MG/ML
12.5 INJECTION, SOLUTION INTRAMUSCULAR; INTRAVENOUS
Status: COMPLETED | OUTPATIENT
Start: 2021-03-25 | End: 2021-03-25

## 2021-03-25 RX ORDER — ONDANSETRON 2 MG/ML
4 INJECTION INTRAMUSCULAR; INTRAVENOUS ONCE
Status: COMPLETED | OUTPATIENT
Start: 2021-03-25 | End: 2021-03-25

## 2021-03-25 RX ORDER — PROMETHAZINE HYDROCHLORIDE 25 MG/1
25 TABLET ORAL
Qty: 12 TAB | Refills: 0 | Status: SHIPPED | OUTPATIENT
Start: 2021-03-25 | End: 2021-11-26

## 2021-03-25 RX ADMIN — ONDANSETRON 4 MG: 2 INJECTION INTRAMUSCULAR; INTRAVENOUS at 17:10

## 2021-03-25 RX ADMIN — SODIUM CHLORIDE 1000 ML: 9 INJECTION, SOLUTION INTRAVENOUS at 17:10

## 2021-03-25 RX ADMIN — MORPHINE SULFATE 2 MG: 2 INJECTION, SOLUTION INTRAMUSCULAR; INTRAVENOUS at 18:48

## 2021-03-25 RX ADMIN — KETOROLAC TROMETHAMINE 15 MG: 30 INJECTION, SOLUTION INTRAMUSCULAR at 17:10

## 2021-03-25 RX ADMIN — PROMETHAZINE HYDROCHLORIDE 12.5 MG: 25 INJECTION INTRAMUSCULAR; INTRAVENOUS at 18:46

## 2021-03-25 RX ADMIN — IOPAMIDOL 96 ML: 612 INJECTION, SOLUTION INTRAVENOUS at 18:06

## 2021-03-25 RX ADMIN — MORPHINE SULFATE 4 MG: 2 INJECTION, SOLUTION INTRAMUSCULAR; INTRAVENOUS at 17:23

## 2021-03-25 NOTE — ED PROVIDER NOTES
EMERGENCY DEPARTMENT HISTORY AND PHYSICAL EXAM    4:56 PM    Date: 3/25/2021  Patient Name: Lissa Rod    History of Presenting Illness     Chief Complaint   Patient presents with    Abdominal Pain       History Provided By: Patient  Location/Duration/Severity/Modifying factors   Patient is a 55-year-old female presents to the emergency department with a chief complaint of right lower quadrant abdominal pain going on for 2 days. Symptoms were initially intermittent now become constant. She has been trying Bentyl at home as well as Zofran. She has not gotten relief. Patient has been seen in the emergency department here and by me personally for abdominal pain on numerous occasions. She has a history of small bowel obstruction and ileus in the past with prior abdominal surgeries include exploratory laparotomy. Patient denies any fevers or chills complains of ongoing watery diarrhea. No bloody stools or black or tarry stools. Pain described as sharp and crampy located primarily in the inguinal region and right lower quadrant. No chest pain or any changes in breathing. PCP: Jasiel Love MD    Current Outpatient Medications   Medication Sig Dispense Refill    promethazine (PHENERGAN) 25 mg tablet Take 1 Tab by mouth every six (6) hours as needed for Nausea (2nd line if zofran ineffective). 12 Tab 0    ondansetron (Zofran ODT) 4 mg disintegrating tablet Take 1 tablets every 6-8 hours as needed for nausea and vomiting. 30 Tab 1    promethazine (PHENERGAN) 25 mg suppository Insert 1-2 Suppositories into rectum every six (6) hours as needed for Nausea. 24 Suppository 0    promethazine (PHENERGAN) 25 mg tablet Take 1 Tab by mouth every six (6) hours as needed for Nausea. 20 Tab 0    dicyclomine (BENTYL) 10 mg/5 mL soln oral solution Take 10 mL by mouth four (4) times daily.  1 Bottle 0    Farxiga 10 mg tab tablet take 1 tablet by mouth once daily 90 Tab 3    dulaglutide (Trulicity) 1.54 mg/0.5 mL sub-q pen 0.5 mL by SubCUTAneous route every seven (7) days. 3 Pen 1    Insulin Needles, Disposable, (Nia Pen Needle) 32 gauge x 5/32\" ndle Give injection once a week 30 Pen Needle 2    doxepin (SINEquan) 50 mg capsule Take 1 Cap by mouth nightly. 90 Cap 3    amLODIPine (NORVASC) 5 mg tablet Take 1 Tab by mouth daily. 90 Tab 3    albuterol sulfate (ProAir RespiClick) 90 mcg/actuation breath activated inhaler Take 2 Puffs by inhalation every four (4) hours as needed for Wheezing, Shortness of Breath or Cough. 1 Inhaler 5    albuterol (PROVENTIL VENTOLIN) 2.5 mg /3 mL (0.083 %) nebu 3 mL by Nebulization route every six (6) hours as needed for Wheezing. 30 Nebule 1    glucose blood VI test strips (OneTouch Ultra Test) strip Check blood glucose as directed 100 Strip 1    bisoprolol-hydroCHLOROthiazide (ZIAC) 2.5-6.25 mg per tablet take 1 tablet by mouth once daily 90 Tab 3    omeprazole (PRILOSEC) 10 mg capsule take 1 capsule by mouth once daily 90 Cap 4    multivitamin (ONE A DAY) tablet Take 1 Tab by mouth daily.          Past History     Past Medical History:  Past Medical History:   Diagnosis Date    Abdominal adhesions     Adnexal cyst 7/30/2019    Left    Anemia     Asthma     Chronic pelvic pain in female 7/30/2019    Diabetes mellitus     Dyskinesia     bilateral    Essential hypertension     GERD (gastroesophageal reflux disease)     Hypertension     Menopause     Microhematuria     Rheumatoid arteritis ([facility] [address] 2018    Stool color black        Past Surgical History:  Past Surgical History:   Procedure Laterality Date    COLONOSCOPY N/A 1/21/2019    COLONOSCOPY performed by Chilo Garvin MD at [facility]    HX CHOLECYSTECTOMY  6/28/10    HX COLONOSCOPY      HX ENDOSCOPY      HX HERNIA REPAIR      HX HYSTERECTOMY  1989    Fibroids    HX KNEE REPLACEMENT Left     HX KNEE REPLACEMENT Right 06/2018    HX OOPHORECTOMY  12/2000    HX ORTHOPAEDIC Right     ankle- multiple surgeries    HX SMALL BOWEL RESECTION  12/2000    HX SMALL BOWEL RESECTION  02/21/2017    Dr. Salvador Modi         Family History:  Family History   Problem Relation Age of Onset    Hypertension Other         parent    Breast Cancer Other 21    Heart Disease Father     Diabetes Father     Lung Disease Father     Diabetes Mother     Hypertension Other         sibling    Diabetes Other         parent    Kidney Disease Brother     Diabetes Brother     Lung Disease Brother        Social History:  Social History     Tobacco Use    Smoking status: Never Smoker    Smokeless tobacco: Never Used   Substance Use Topics    Alcohol use: No    Drug use: No       Allergies: Allergies   Allergen Reactions    Macrodantin [Nitrofurantoin Macrocrystalline] Itching and Other (comments)    Tape [Adhesive] Other (comments)     Paper tape-- feels like burning skin  Burned skin when had wound vac       I reviewed and confirmed the above information with patient and updated as necessary. Review of Systems     Review of Systems   Constitutional: Positive for fatigue. Negative for chills and fever. HENT: Negative for congestion, rhinorrhea, sinus pressure and sneezing. Eyes: Negative for visual disturbance. Respiratory: Negative for cough and shortness of breath. Cardiovascular: Negative for chest pain. Gastrointestinal: Positive for abdominal pain, diarrhea and nausea. Negative for anal bleeding, blood in stool and vomiting. Genitourinary: Negative for dysuria, frequency and urgency. Musculoskeletal: Negative for back pain and neck pain. Skin: Negative for rash. Neurological: Negative for syncope, numbness and headaches. Physical Exam     Visit Vitals  /84   Pulse 93   Temp 97.6 °F (36.4 °C)   Resp 18   SpO2 100%       Physical Exam  Vitals signs and nursing note reviewed. Constitutional:       General: She is not in acute distress.      Appearance: Normal appearance. She is normal weight. She is not ill-appearing or toxic-appearing. HENT:      Head: Normocephalic and atraumatic. Right Ear: External ear normal.      Left Ear: External ear normal.      Nose: Nose normal.      Mouth/Throat:      Mouth: Mucous membranes are moist.   Eyes:      Conjunctiva/sclera: Conjunctivae normal.   Neck:      Musculoskeletal: Normal range of motion and neck supple. Cardiovascular:      Rate and Rhythm: Normal rate and regular rhythm. Pulses: Normal pulses. Heart sounds: Normal heart sounds. No murmur. Pulmonary:      Effort: Pulmonary effort is normal.      Breath sounds: Normal breath sounds. No wheezing, rhonchi or rales. Abdominal:      General: Abdomen is flat. A surgical scar is present. Palpations: Abdomen is soft. Tenderness: There is abdominal tenderness (Mild) in the right lower quadrant. There is no guarding or rebound. Musculoskeletal: Normal range of motion. General: No swelling or tenderness. Right lower leg: No edema. Left lower leg: No edema. Skin:     General: Skin is warm and dry. Capillary Refill: Capillary refill takes less than 2 seconds. Findings: No rash. Neurological:      General: No focal deficit present. Mental Status: She is alert.          Diagnostic Study Results     Labs -  Recent Results (from the past 24 hour(s))   URINALYSIS W/ RFLX MICROSCOPIC    Collection Time: 03/25/21  5:00 PM   Result Value Ref Range    Color YELLOW      Appearance CLEAR      Specific gravity 1.011 1.005 - 1.030      pH (UA) 6.5 5.0 - 8.0      Protein Negative NEG mg/dL    Glucose 500 (A) NEG mg/dL    Ketone Negative NEG mg/dL    Bilirubin Negative NEG      Blood Negative NEG      Urobilinogen 0.2 0.2 - 1.0 EU/dL    Nitrites Negative NEG      Leukocyte Esterase Negative NEG     CBC WITH AUTOMATED DIFF    Collection Time: 03/25/21  5:11 PM   Result Value Ref Range    WBC 4.8 4.6 - 13.2 K/uL    RBC 5.31 (H) 4.20 - 5.30 M/uL    HGB 13.3 12.0 - 16.0 g/dL    HCT 44.1 35.0 - 45.0 %    MCV 83.1 74.0 - 97.0 FL    MCH 25.0 24.0 - 34.0 PG    MCHC 30.2 (L) 31.0 - 37.0 g/dL    RDW 15.0 (H) 11.6 - 14.5 %    PLATELET 221 931 - 239 K/uL    MPV 10.7 9.2 - 11.8 FL    NEUTROPHILS 58 40 - 73 %    LYMPHOCYTES 32 21 - 52 %    MONOCYTES 7 3 - 10 %    EOSINOPHILS 3 0 - 5 %    BASOPHILS 0 0 - 2 %    ABS. NEUTROPHILS 2.8 1.8 - 8.0 K/UL    ABS. LYMPHOCYTES 1.5 0.9 - 3.6 K/UL    ABS. MONOCYTES 0.3 0.05 - 1.2 K/UL    ABS. EOSINOPHILS 0.1 0.0 - 0.4 K/UL    ABS. BASOPHILS 0.0 0.0 - 0.1 K/UL    DF AUTOMATED     METABOLIC PANEL, COMPREHENSIVE    Collection Time: 03/25/21  5:11 PM   Result Value Ref Range    Sodium 137 136 - 145 mmol/L    Potassium 3.6 3.5 - 5.5 mmol/L    Chloride 104 100 - 111 mmol/L    CO2 31 21 - 32 mmol/L    Anion gap 2 (L) 3.0 - 18 mmol/L    Glucose 106 (H) 74 - 99 mg/dL    BUN 13 7.0 - 18 MG/DL    Creatinine 1.22 0.6 - 1.3 MG/DL    BUN/Creatinine ratio 11 (L) 12 - 20      GFR est AA 54 (L) >60 ml/min/1.73m2    GFR est non-AA 45 (L) >60 ml/min/1.73m2    Calcium 9.5 8.5 - 10.1 MG/DL    Bilirubin, total 0.3 0.2 - 1.0 MG/DL    ALT (SGPT) 27 13 - 56 U/L    AST (SGOT) 18 10 - 38 U/L    Alk. phosphatase 188 (H) 45 - 117 U/L    Protein, total 9.0 (H) 6.4 - 8.2 g/dL    Albumin 3.8 3.4 - 5.0 g/dL    Globulin 5.2 (H) 2.0 - 4.0 g/dL    A-G Ratio 0.7 (L) 0.8 - 1.7     LIPASE    Collection Time: 03/25/21  5:11 PM   Result Value Ref Range    Lipase 203 73 - 393 U/L         Radiologic Studies -   CT ABD PELV W CONT    (Results Pending)           Medical Decision Making   I am the first provider for this patient. I reviewed the vital signs, available nursing notes, past medical history, past surgical history, family history and social history. Vital Signs-Reviewed the patient's vital signs.         Records Reviewed: Nursing Notes and Old Medical Records (Time of Review: 4:56 PM)    ED Course: Progress Notes, Reevaluation, and Consults:  ED Course as of Mar 25 2243   Thu Mar 25, 2021   1907 Wet Read:       1. No acute abnormality. 2. Post-surgical changes in the bowel. 3. Physiologic left adnexal cyst.     [BOBBY]      ED Course User Index  [BOBBY] Falguni Stallings DO         Provider Notes (Medical Decision Making):   MDM  Number of Diagnoses or Management Options  Abdominal pain, right lower quadrant  History of small bowel obstruction  Mild dehydration  Diagnosis management comments: 27-year-old female with history of prior small bowel obstructions presenting to the emergency department with a chief complaint of abdominal pain. DDX: appendicitis, diverticulitis, renal colic, UTI, cholecystitis, biliary colic, constipation, IBS, GI bleed, PUD, perforated bowel, bowel obstruction, bowel ischemia, pancreatitis, hepatitis, inguinal hernia, incarcerated hernia, less likely cardiac etiology    We will get CT imaging, treat with morphine, Zofran and Toradol for now. Will check basic labs and urine. Work-up is reassuring. She is feeling much better with the Phenergan given to her. Advise follow-up with her GI doctor and her general surgeon and PCP. Advised return if symptoms are worsening or changing in any way in the meantime. At this time, patient is stable and appropriate for discharge home.  Patient demonstrates understanding of current diagnoses and is in agreement with the treatment plan. Joel Will are advised that while the likelihood of serious underlying condition is low at this point given the evaluation performed today, we cannot fully rule it out. Joel Will are advised to immediately return with any new symptoms or worsening of current condition.  All questions have been answered. Salina Mixon is given educational material regarding their diagnoses, including danger symptoms and when to return to the ED. This note was dictated utilizing Dragon voice recognition software. Unfortunately this leads to occasional typographical errors.  I apologize in advance if the situation occurs. If questions occur please do not hesitate to contact me directly. Miquel Child,           Procedures        Diagnosis     Clinical Impression:   1. Abdominal pain, right lower quadrant    2. History of small bowel obstruction    3. Mild dehydration        Disposition: Discharge    Follow-up Information     Follow up With Specialties Details Why Contact Info    Jacey Estrella MD Family Medicine, Internal Medicine In 2 days  [address] [facility] [address] Alma Rosa Mars MD Surgery In 2 days  [address] [facility] [address]  [address] [id number]  [phone]      [facility] EMERGENCY DEPT Emergency Medicine  If symptoms worsen, As needed [address]  [phone]           Discharge Medication List as of 3/25/2021  7:16 PM      START taking these medications    Details   ! ! promethazine (PHENERGAN) 25 mg tablet Take 1 Tab by mouth every six (6) hours as needed for Nausea (2nd line if zofran ineffective). , Normal, Disp-12 Tab, R-0       !! - Potential duplicate medications found. Please discuss with provider. CONTINUE these medications which have NOT CHANGED    Details   ondansetron (Zofran ODT) 4 mg disintegrating tablet Take 1 tablets every 6-8 hours as needed for nausea and vomiting., Normal, Disp-30 Tab, R-1      promethazine (PHENERGAN) 25 mg suppository Insert 1-2 Suppositories into rectum every six (6) hours as needed for Nausea., Normal, Disp-24 Suppository, R-0      !! promethazine (PHENERGAN) 25 mg tablet Take 1 Tab by mouth every six (6) hours as needed for Nausea., Normal, Disp-20 Tab, R-0      dicyclomine (BENTYL) 10 mg/5 mL soln oral solution Take 10 mL by mouth four (4) times daily. , Normal, Disp-1 Bottle, R-0      Farxiga 10 mg tab tablet take 1 tablet by mouth once daily, Normal, Disp-90 Tab, R-3      dulaglutide (Trulicity) 3.19 ME/5.3 mL sub-q pen 0.5 mL by SubCUTAneous route every seven (7) days., Normal, Disp-3 Pen, R-1      Insulin Needles, Disposable, (Nia Pen Needle) 32 gauge x 5/32\" ndle Give injection once a week, Normal, Disp-30 Pen Needle, R-2      doxepin (SINEquan) 50 mg capsule Take 1 Cap by mouth nightly., Normal, Disp-90 Cap, R-3      amLODIPine (NORVASC) 5 mg tablet Take 1 Tab by mouth daily. , Normal, Disp-90 Tab, R-3Discontinue previous order      albuterol sulfate (ProAir RespiClick) 90 mcg/actuation breath activated inhaler Take 2 Puffs by inhalation every four (4) hours as needed for Wheezing, Shortness of Breath or Cough., Normal, Disp-1 Inhaler, R-5Discontinue previous order      albuterol (PROVENTIL VENTOLIN) 2.5 mg /3 mL (0.083 %) nebu 3 mL by Nebulization route every six (6) hours as needed for Wheezing., Normal, Disp-30 Nebule, R-1Discontinue previous order      glucose blood VI test strips (OneTouch Ultra Test) strip Check blood glucose as directed, Normal, Disp-100 Strip, R-1      bisoprolol-hydroCHLOROthiazide (ZIAC) 2.5-6.25 mg per tablet take 1 tablet by mouth once daily, Normal, Disp-90 Tab, R-3Discontinue previous order      omeprazole (PRILOSEC) 10 mg capsule take 1 capsule by mouth once daily, Normal, Disp-90 Cap,R-4      multivitamin (ONE A DAY) tablet Take 1 Tab by mouth daily. , Historical Med       !! - Potential duplicate medications found. Please discuss with provider. Suki Hernandez DO   Emergency Medicine   March 25, 2021, 4:56 PM     This note is dictated utilizing Dragon voice recognition software. Unfortunately this leads to occasional typographical errors using the voice recognition. I apologize in advance if the situation occurs. If questions occur please do not hesitate to contact me directly.     Suki Hernandez, DO

## 2021-03-26 ENCOUNTER — PATIENT OUTREACH (OUTPATIENT)
Dept: CASE MANAGEMENT | Age: 61
End: 2021-03-26

## 2021-03-26 NOTE — PROGRESS NOTES
Patient contacted regarding recent discharge and COVID-19 risk. Discussed COVID-19 related testing which was not done at this time. Test results were not done. Patient informed of results, if available? n/a    Outreach made within 2 business days of discharge: Yes    LPN Care Coordinator contacted the patient by telephone to perform post discharge assessment. Verified name and  with patient as identifiers. Patient has following risk factors of: asthma, diabetes and htn. LPN Care Coordinator reviewed discharge instructions, medical action plan and red flags related to discharge diagnosis. Reviewed and educated them on any new and changed medications related to discharge diagnosis. Patient given an opportunity for questions and concerns. The patient agrees to contact PCP office for questions related to their healthcare. LPN Care Coordinator provided contact information for future reference. From CDC: Are you at higher risk for severe illness?  Wash your hands often.  Avoid close contact (6 feet, which is about two arm lengths) with people who are sick.  Put distance between yourself and other people if COVID-19 is spreading in your community.  Clean and disinfect frequently touched surfaces.  Avoid all cruise travel and non-essential air travel.  Call your healthcare professional if you have concerns about COVID-19 and your underlying condition or if you are sick. For more information on steps you can take to protect yourself, see CDC's How to Protect Yourself      Patient/family/caregiver given information for Get[employer] Loop and agrees to enroll no  Patient's preferred e-mail:  n/a  Patient's preferred phone number: n/a  Based on Loop alert triggers, patient will be contacted by nurse care manager for worsening symptoms. Plan for follow-up call in 7-14 days based on severity of symptoms and risk factors.

## 2021-04-09 ENCOUNTER — PATIENT OUTREACH (OUTPATIENT)
Dept: CASE MANAGEMENT | Age: 61
End: 2021-04-09

## 2021-04-09 NOTE — PROGRESS NOTES
Date/Time:  4/9/2021 9:28 AM   Call within 2 business days of discharge: Yes   Attempted to reach patient by telephone. Left HIPPA compliant message requesting a return call. This episode is resolved.

## 2021-05-20 ENCOUNTER — OFFICE VISIT (OUTPATIENT)
Dept: FAMILY MEDICINE CLINIC | Age: 61
End: 2021-05-20

## 2021-05-20 VITALS
HEIGHT: 64 IN | DIASTOLIC BLOOD PRESSURE: 92 MMHG | TEMPERATURE: 97.8 F | RESPIRATION RATE: 18 BRPM | OXYGEN SATURATION: 99 % | HEART RATE: 98 BPM | BODY MASS INDEX: 25.78 KG/M2 | WEIGHT: 151 LBS | SYSTOLIC BLOOD PRESSURE: 131 MMHG

## 2021-05-20 NOTE — PROGRESS NOTES
A user error has taken place: encounter opened in error, closed for administrative reasons. Pt had to leave, mother is in the hospital and her mother called her upset. Pt will call back to schedule an appointment.

## 2021-06-02 DIAGNOSIS — T38.0X5S STEROID-INDUCED DIABETES MELLITUS, SEQUELA (HCC): ICD-10-CM

## 2021-06-02 DIAGNOSIS — E09.9 STEROID-INDUCED DIABETES MELLITUS, SEQUELA (HCC): ICD-10-CM

## 2021-06-02 RX ORDER — DULAGLUTIDE 0.75 MG/.5ML
0.75 INJECTION, SOLUTION SUBCUTANEOUS
Qty: 3 PEN | Refills: 1 | Status: SHIPPED | OUTPATIENT
Start: 2021-06-02 | End: 2021-09-20 | Stop reason: SDUPTHER

## 2021-06-10 NOTE — PROGRESS NOTES
Lore Chinchilla presents today for   Chief Complaint   Patient presents with    Pelvic Pain       Is someone accompanying this pt? no    Is the patient using any DME equipment during [address]? no    Depression Screening:  3 most recent PHQ Screens 1/29/2020   Little interest or pleasure in doing things Not at all   Feeling down, depressed, irritable, or hopeless Not at all   Total Score PHQ 2 0       Learning Assessment:  Learning Assessment 7/22/2019   PRIMARY LEARNER Patient   HIGHEST LEVEL OF EDUCATION - PRIMARY LEARNER  -   BARRIERS PRIMARY LEARNER NONE   CO-LEARNER CAREGIVER No   PRIMARY LANGUAGE ENGLISH   LEARNER PREFERENCE PRIMARY READING   ANSWERED BY Patient   RELATIONSHIP SELF       Abuse Screening:  Abuse Screening Questionnaire 7/22/2019   Do you ever feel afraid of your partner? N   Are you in a relationship with someone who physically or mentally threatens you? N   Is it safe for you to go home? Y       Fall Risk  Fall Risk Assessment, last 12 mths 7/22/2019   Able to walk? Yes   Fall in past 12 months? No       Health Maintenance reviewed and discussed and ordered per Provider. Health Maintenance Due   Topic Date Due    DTaP/Tdap/Td series (1 - Tdap) 02/04/1971    PAP AKA CERVICAL CYTOLOGY  02/04/1981    Shingrix Vaccine Age 50> (1 of 2) 02/04/2010    Eye Exam Retinal or Dilated  05/29/2019   . Coordination of Care:  1. Have you been to the ER, urgent care clinic since your last visit? Hospitalized since your last visit? no    2. Have you seen or consulted any other health care providers outside of the [address] [place] [address] since your last visit? Include any pap smears or colon screening. no      Last  Checked na  Last UDS Checked na  Last Pain contract signed: na    Patient presents in office today for routine care.   Patient concerns: pelvic pain Griseofulvin Pregnancy And Lactation Text: This medication is Pregnancy Category X and is known to cause serious birth defects. It is unknown if this medication is excreted in breast milk but breast feeding should be avoided.

## 2021-09-20 DIAGNOSIS — E09.9 STEROID-INDUCED DIABETES MELLITUS, SEQUELA (HCC): ICD-10-CM

## 2021-09-20 DIAGNOSIS — T38.0X5S STEROID-INDUCED DIABETES MELLITUS, SEQUELA (HCC): ICD-10-CM

## 2021-09-20 DIAGNOSIS — I10 ESSENTIAL HYPERTENSION: ICD-10-CM

## 2021-09-20 RX ORDER — DULAGLUTIDE 0.75 MG/.5ML
0.75 INJECTION, SOLUTION SUBCUTANEOUS
Qty: 12 PEN | Refills: 3 | Status: SHIPPED | OUTPATIENT
Start: 2021-09-20 | End: 2021-09-27 | Stop reason: SDUPTHER

## 2021-09-27 RX ORDER — BISOPROLOL FUMARATE AND HYDROCHLOROTHIAZIDE 2.5; 6.25 MG/1; MG/1
TABLET ORAL
Qty: 90 TABLET | Refills: 3 | Status: SHIPPED | OUTPATIENT
Start: 2021-09-27

## 2021-09-27 RX ORDER — DULAGLUTIDE 0.75 MG/.5ML
0.75 INJECTION, SOLUTION SUBCUTANEOUS
Qty: 12 PEN | Refills: 3 | Status: SHIPPED | OUTPATIENT
Start: 2021-09-27 | End: 2021-11-26 | Stop reason: DRUGHIGH

## 2021-09-27 NOTE — TELEPHONE ENCOUNTER
Patient called back stating that she spoke with the pharmacy on Friday and they stated that they never received the prescription. Pt also stated that she was out of her bp medication as well, please advise. Requested Prescriptions     Pending Prescriptions Disp Refills    bisoprolol-hydroCHLOROthiazide (ZIAC) 2.5-6.25 mg per tablet 90 Tablet 3     Sig: take 1 tablet by mouth once daily    dulaglutide (Trulicity) 3.14 IX/0.7 mL sub-q pen 12 Pen 3     Si.5 mL by SubCUTAneous route every seven (7) days.  dapagliflozin (Farxiga) 10 mg tab tablet 90 Tablet 3     Sig: Take 1 Tablet by mouth daily.

## 2021-11-24 ENCOUNTER — OFFICE VISIT (OUTPATIENT)
Dept: FAMILY MEDICINE CLINIC | Age: 61
End: 2021-11-24
Payer: COMMERCIAL

## 2021-11-24 ENCOUNTER — HOSPITAL ENCOUNTER (OUTPATIENT)
Dept: LAB | Age: 61
Discharge: HOME | End: 2021-11-24

## 2021-11-24 VITALS
WEIGHT: 157.9 LBS | OXYGEN SATURATION: 99 % | TEMPERATURE: 97 F | HEART RATE: 90 BPM | DIASTOLIC BLOOD PRESSURE: 90 MMHG | SYSTOLIC BLOOD PRESSURE: 132 MMHG | RESPIRATION RATE: 16 BRPM | HEIGHT: 64 IN | BODY MASS INDEX: 26.96 KG/M2

## 2021-11-24 DIAGNOSIS — R10.31 GROIN PAIN, CHRONIC, RIGHT: Primary | ICD-10-CM

## 2021-11-24 DIAGNOSIS — G89.29 GROIN PAIN, CHRONIC, RIGHT: Primary | ICD-10-CM

## 2021-11-24 DIAGNOSIS — N93.9 VAGINAL BLEEDING: ICD-10-CM

## 2021-11-24 DIAGNOSIS — T38.0X5S STEROID-INDUCED DIABETES MELLITUS, SEQUELA (HCC): ICD-10-CM

## 2021-11-24 DIAGNOSIS — Z00.00 ANNUAL PHYSICAL EXAM: ICD-10-CM

## 2021-11-24 DIAGNOSIS — I10 ESSENTIAL HYPERTENSION, BENIGN: ICD-10-CM

## 2021-11-24 DIAGNOSIS — E09.9 STEROID-INDUCED DIABETES MELLITUS, SEQUELA (HCC): ICD-10-CM

## 2021-11-24 DIAGNOSIS — F33.9 RECURRENT DEPRESSION (HCC): ICD-10-CM

## 2021-11-24 DIAGNOSIS — G47.01 INSOMNIA SECONDARY TO CHRONIC PAIN: ICD-10-CM

## 2021-11-24 DIAGNOSIS — G89.29 INSOMNIA SECONDARY TO CHRONIC PAIN: ICD-10-CM

## 2021-11-24 DIAGNOSIS — E11.65 TYPE 2 DIABETES MELLITUS WITH HYPERGLYCEMIA, WITHOUT LONG-TERM CURRENT USE OF INSULIN (HCC): ICD-10-CM

## 2021-11-24 DIAGNOSIS — Z23 NEEDS FLU SHOT: ICD-10-CM

## 2021-11-24 LAB
BILIRUB UR QL STRIP: NEGATIVE
GLUCOSE UR-MCNC: NORMAL MG/DL
KETONES P FAST UR STRIP-MCNC: NEGATIVE MG/DL
PH UR STRIP: 5.5 [PH] (ref 4.6–8)
PROT UR QL STRIP: NORMAL
SENTARA SPECIMEN COL,SENBCF: NORMAL
SP GR UR STRIP: 1.02 (ref 1–1.03)
UA UROBILINOGEN AMB POC: NORMAL (ref 0.2–1)
URINALYSIS CLARITY POC: CLEAR
URINALYSIS COLOR POC: YELLOW
URINE BLOOD POC: NEGATIVE
URINE LEUKOCYTES POC: NEGATIVE
URINE NITRITES POC: NEGATIVE

## 2021-11-24 PROCEDURE — 99396 PREV VISIT EST AGE 40-64: CPT | Performed by: FAMILY MEDICINE

## 2021-11-24 PROCEDURE — 81001 URINALYSIS AUTO W/SCOPE: CPT | Performed by: FAMILY MEDICINE

## 2021-11-24 PROCEDURE — 99001 SPECIMEN HANDLING PT-LAB: CPT

## 2021-11-24 PROCEDURE — 90686 IIV4 VACC NO PRSV 0.5 ML IM: CPT | Performed by: FAMILY MEDICINE

## 2021-11-24 PROCEDURE — 90471 IMMUNIZATION ADMIN: CPT | Performed by: FAMILY MEDICINE

## 2021-11-24 RX ORDER — DOXEPIN HYDROCHLORIDE 50 MG/1
50 CAPSULE ORAL
Qty: 90 CAPSULE | Refills: 3 | Status: SHIPPED | OUTPATIENT
Start: 2021-11-24

## 2021-11-24 RX ORDER — AMLODIPINE BESYLATE 5 MG/1
5 TABLET ORAL DAILY
Qty: 90 TABLET | Refills: 3 | Status: SHIPPED | OUTPATIENT
Start: 2021-11-24

## 2021-11-24 RX ORDER — NAPROXEN 500 MG/1
500 TABLET ORAL 2 TIMES DAILY WITH MEALS
Qty: 60 TABLET | Refills: 0 | Status: SHIPPED | OUTPATIENT
Start: 2021-11-24

## 2021-11-24 NOTE — PROGRESS NOTES
Tiny Eckert is a 64 y.o.  female and presents with    Chief Complaint   Patient presents with    Annual Exam    UTI    Groin Pain       Subjective: Well Adult Physical   Patient here for a comprehensive physical exam.The patient reports problems - hematuria and vaginal bleeding; she was seen in [facility] a couple of weeks ago; her work-up was normal.  She was discharged to home with antibiotics but did not tolerate this. She is s/p hysterectomy. She was referred to urology and had a message from them today. She has groin pain and has been doing heavy lifting at work. She denies bulging in her groin. She had CT scan and was told that she had a muscle strain. She has left leg paresthesias  Do you take any herbs or supplements that were not prescribed by a doctor? yes Are you taking calcium supplements? yes Are you taking aspirin daily? not applicable    Diabetes Mellitus:  She has diabetes mellitus, and  Hyperlipidemia.  she has not monitored her glucose control. Diabetic ROS - medication compliance: compliant most of the time, diabetic diet compliance: noncompliant some of the time. Lab review: labs reviewed, I note that glycosylated hemoglobin is due in 1 month.     Depression Review:  Patient is seen for followup of depression. Treatment includes none and no other therapies. Ongoing symptoms include depressed mood, anhedonia, insomnia, fatigue, feelings of worthlessness/guilt, difficulty concentrating and hopelessness. She denies hypersomnia, impaired memory and recurrent thoughts of death.   She experiences the following side effects from the treatment: none. Anxiety Review:  Patient is seen for sleep disturbance. Current treatment includes doxepin and no other therapies. Ongoing symptoms include: insomnia.    Patient denies: palpitations, sweating, chest pain, shortness of breath, dizziness, paresthesias, racing thoughts, psychomotor agitation, feelings of losing control, difficulty concentrating, suicidal ideation. Reported side effects from the treatment: none.     ROS   Constitutional: Negative for activity change and appetite change. HENT: Negative for drooling and facial swelling.    Eyes: Negative for pain and discharge. Respiratory: Negative for apnea and choking.    Cardiovascular: Negative for palpitations and leg swelling. Gastrointestinal: Positive for abdominal pain, diarrhea, nausea and vomiting. Negative for blood in stool and rectal pain. Endocrine: Negative for polydipsia and polyphagia. Genitourinary: Negative for genital sores and hematuria. Musculoskeletal: Negative for gait problem and neck stiffness. Skin: Negative for color change and rash. Allergic/Immunologic: Negative for environmental allergies. Neurological: Negative for tremors. Hematological: Negative for adenopathy. Psychiatric/Behavioral: Negative for agitation and behavioral problems.     All other systems reviewed and are negative. Objective:  Vitals:    11/24/21 0848 11/24/21 0850   BP: (!) 150/111 (!) 132/90   Pulse: 90    Resp: 16    Temp: 97 °F (36.1 °C)    TempSrc: Temporal    SpO2: 99%    Weight: 157 lb 14.4 oz (71.6 kg)    Height: 5' 4\" (1.626 m)    PainSc:   8      BMI 27.10 kg/m²       General appearance  alert, cooperative, no distress, appears stated age   Head  Normocephalic, without obvious abnormality, atraumatic   Eyes  conjunctivae/corneas clear. PERRL, EOM's intact. Ears  normal TM's and external ear canals AU   Nose Nares normal. Septum midline. Mucosa normal. No drainage or sinus tenderness. Throat Lips, mucosa, and tongue normal. Teeth and gums normal   Neck supple, symmetrical, trachea midline, no adenopathy, thyroid: not enlarged, symmetric, no tenderness/mass/nodules and no JVD   Back   symmetric, no curvature.  ROM normal. No CVA tenderness   Lungs   clear to auscultation bilaterally   Breasts  Not examined   Heart  regular rate and rhythm, S1, S2 normal, no murmur, click, rub or gallop   Abdomen   soft, non-tender. Bowel sounds normal. No masses,  No organomegaly   Pelvic Deferred   Extremities extremities normal, atraumatic, no cyanosis or edema   Pulses 2+ and symmetric   Skin Skin color, texture, turgor normal. No rashes or lesions   Lymph nodes Cervical, supraclavicular, and axillary nodes normal.   Neurologic Normal   msk - upper inner left leg with medial ttp    LABS   hgb a1c 9.2  TESTS      Assessment/Plan:    1. Groin pain, chronic, right  Urinalysis normal  - AMB POC URINALYSIS DIP STICK AUTO W/ MICRO    2. Vaginal bleeding  F/u with urologist and gynecologist    3. Annual physical exam  Reviewed preventive recommendations    4. Needs flu shot    - INFLUENZA VIRUS VAC QUAD,SPLIT,PRESV FREE SYRINGE IM  - RI IMMUNIZ ADMIN,1 SINGLE/COMB VAC/TOXOID    5. Type 2 diabetes mellitus with hyperglycemia, without long-term current use of insulin (HCC)  Goal hgb a1c <7; needs updated lab test; reviewed test; elevated a1c; increase dose of trulicity to 1.5 mg  - HEMOGLOBIN A1C WITH EAG; Future  - LIPID PANEL; Future  - MICROALBUMIN, UR, RAND W/ MICROALB/CREAT RATIO; Future    6. Essential hypertension, benign  Goal <130/80    7. Recurrent depression ([facility] [address]  Continue treatment; assess for organic etiology  - CBC WITH AUTOMATED DIFF; Future      Lab review: orders written for new lab studies as appropriate; see orders      I have discussed the diagnosis with the patient and the intended plan as seen in the above orders. The patient has received an after-visit summary and questions were answered concerning future plans. I have discussed medication side effects and warnings with the patient as well. I have reviewed the plan of care with the patient, accepted their input and they are in agreement with the treatment goals.

## 2021-11-24 NOTE — PROGRESS NOTES
Davon Thomas is a 64 y.o. female (: 1960) presenting to address:    Chief Complaint   Patient presents with    Follow-up    UTI    Groin Pain       There were no vitals filed for this visit. Hearing/Vision:   No exam data present    Learning Assessment:     Learning Assessment 2021   PRIMARY LEARNER Patient   HIGHEST LEVEL OF EDUCATION - PRIMARY LEARNER  -   BARRIERS PRIMARY LEARNER -   CO-LEARNER CAREGIVER -   PRIMARY LANGUAGE ENGLISH   LEARNER PREFERENCE PRIMARY DEMONSTRATION   ANSWERED BY patient   RELATIONSHIP SELF     Depression Screening:     3 most recent PHQ Screens 2021   Little interest or pleasure in doing things Not at all   Feeling down, depressed, irritable, or hopeless Not at all   Total Score PHQ 2 0     Fall Risk Assessment:     Fall Risk Assessment, last 12 mths 10/14/2020   Able to walk? Yes   Fall in past 12 months? No   Number of falls in past 12 months -   Fall with injury? -     Abuse Screening:     Abuse Screening Questionnaire 10/14/2020   Do you ever feel afraid of your partner? N   Are you in a relationship with someone who physically or mentally threatens you? N   Is it safe for you to go home? Y     ADL Assessment:     ADL Assessment 2019   Feeding yourself No Help Needed   Getting from bed to chair No Help Needed   Getting dressed No Help Needed   Bathing or showering No Help Needed   Walk across the room (includes cane/walker) No Help Needed   Using the telphone No Help Needed   Taking your medications No Help Needed   Preparing meals No Help Needed   Managing money (expenses/bills) No Help Needed   Moderately strenuous housework (laundry) No Help Needed   Shopping for personal items (toiletries/medicines) No Help Needed   Shopping for groceries No Help Needed   Driving Help Needed   Climbing a flight of stairs Help Needed   Getting to places beyond walking distances Help Needed        Coordination of Care Questionaire:   1.  Have you been to the ER, urgent care clinic since your last visit? Hospitalized since your last visit? YES  Emergency room  2. Have you seen or consulted any other health care providers outside of the [address] [place] [address] since your last visit? Include any pap smears or colon screening. NO    Advanced Directive:   1. Do you have an Advanced Directive? NO    2. Would you like information on Advanced Directives?  NO

## 2021-11-25 LAB
ABSOLUTE LYMPHOCYTE COUNT, 10803: 1.5 K/UL (ref 1–4.8)
AVG GLU, 10930: 218 MG/DL (ref 91–123)
BASOPHILS # BLD: 0 K/UL (ref 0–0.2)
BASOPHILS NFR BLD: 1 % (ref 0–2)
CHOLEST SERPL-MCNC: 291 MG/DL (ref 110–200)
CREATININE, URINE: 89 MG/DL
EOSINOPHIL # BLD: 0.3 K/UL (ref 0–0.5)
EOSINOPHIL NFR BLD: 5 % (ref 0–6)
ERYTHROCYTE [DISTWIDTH] IN BLOOD BY AUTOMATED COUNT: 17 % (ref 10–15.5)
GRANULOCYTES,GRANS: 56 % (ref 40–75)
HBA1C MFR BLD HPLC: 9.2 % (ref 4.8–5.6)
HCT VFR BLD AUTO: 48.1 % (ref 35.1–48)
HDLC SERPL-MCNC: 5.5 MG/DL (ref 0–5)
HDLC SERPL-MCNC: 53 MG/DL
HGB BLD-MCNC: 13.6 G/DL (ref 11.7–16)
LDL/HDL RATIO,LDHD: 3.3
LDLC SERPL CALC-MCNC: 172 MG/DL (ref 50–99)
LYMPHOCYTES, LYMLT: 30 % (ref 20–45)
MCH RBC QN AUTO: 25 PG (ref 26–34)
MCHC RBC AUTO-ENTMCNC: 28 G/DL (ref 31–36)
MCV RBC AUTO: 90 FL (ref 80–99)
MICROALB/CREAT RATIO, 140286: NORMAL
MICROALBUMIN,URINE RANDOM 140054: <12 MG/L (ref 0.1–17)
MONOCYTES # BLD: 0.4 K/UL (ref 0.1–1)
MONOCYTES NFR BLD: 8 % (ref 3–12)
NEUTROPHILS # BLD AUTO: 2.9 K/UL (ref 1.8–7.7)
NON-HDL CHOLESTEROL, 011976: 239 MG/DL
PLATELET # BLD AUTO: 314 K/UL (ref 140–440)
PMV BLD AUTO: 11.9 FL (ref 9–13)
RBC # BLD AUTO: 5.37 M/UL (ref 3.8–5.2)
TRIGL SERPL-MCNC: 333 MG/DL (ref 40–149)
VLDLC SERPL CALC-MCNC: 67 MG/DL (ref 8–30)
WBC # BLD AUTO: 5.1 K/UL (ref 4–11)

## 2021-11-26 RX ORDER — DULAGLUTIDE 0.75 MG/.5ML
1.5 INJECTION, SOLUTION SUBCUTANEOUS
Qty: 12 PEN | Refills: 3 | Status: SHIPPED | OUTPATIENT
Start: 2021-11-26 | End: 2021-12-23 | Stop reason: DRUGHIGH

## 2021-12-23 ENCOUNTER — OFFICE VISIT (OUTPATIENT)
Dept: FAMILY MEDICINE CLINIC | Age: 61
End: 2021-12-23
Payer: COMMERCIAL

## 2021-12-23 VITALS
OXYGEN SATURATION: 98 % | HEIGHT: 64 IN | SYSTOLIC BLOOD PRESSURE: 110 MMHG | DIASTOLIC BLOOD PRESSURE: 83 MMHG | BODY MASS INDEX: 25.44 KG/M2 | TEMPERATURE: 97.9 F | HEART RATE: 90 BPM | RESPIRATION RATE: 16 BRPM | WEIGHT: 149 LBS

## 2021-12-23 DIAGNOSIS — T38.0X5S STEROID-INDUCED DIABETES MELLITUS, SEQUELA (HCC): ICD-10-CM

## 2021-12-23 DIAGNOSIS — E11.65 TYPE 2 DIABETES MELLITUS WITH HYPERGLYCEMIA, WITHOUT LONG-TERM CURRENT USE OF INSULIN (HCC): ICD-10-CM

## 2021-12-23 DIAGNOSIS — R10.31 GROIN PAIN, CHRONIC, RIGHT: Primary | ICD-10-CM

## 2021-12-23 DIAGNOSIS — F33.9 RECURRENT DEPRESSION (HCC): ICD-10-CM

## 2021-12-23 DIAGNOSIS — E09.9 STEROID-INDUCED DIABETES MELLITUS, SEQUELA (HCC): ICD-10-CM

## 2021-12-23 DIAGNOSIS — I10 ESSENTIAL HYPERTENSION, BENIGN: ICD-10-CM

## 2021-12-23 DIAGNOSIS — G89.29 GROIN PAIN, CHRONIC, RIGHT: Primary | ICD-10-CM

## 2021-12-23 PROCEDURE — 99214 OFFICE O/P EST MOD 30 MIN: CPT | Performed by: FAMILY MEDICINE

## 2021-12-23 RX ORDER — DULAGLUTIDE 1.5 MG/.5ML
1.5 INJECTION, SOLUTION SUBCUTANEOUS
Qty: 4 EACH | Refills: 5 | Status: SHIPPED | OUTPATIENT
Start: 2021-12-23

## 2021-12-23 NOTE — PROGRESS NOTES
Rebecca Stephenson is a 64 y.o.  female and presents with    Chief Complaint   Patient presents with    Diabetes    Anxiety    Hypertension       Subjective:  She is following up for poorly controlled diabetes; her trulicity was not approved in the way it was written  Diabetes Mellitus:  She has diabetes mellitus, and  Hyperlipidemia.  she has not monitored her glucose control. Diabetic ROS - medication compliance: compliant most of the time, diabetic diet compliance: noncompliant some of the time. Lab review: labs reviewed, I note that glycosylated hemoglobin is due in 1 month.     Depression Review:  Patient is seen for followup of depression. Treatment includes none and no other therapies. Ongoing symptoms include depressed mood, anhedonia, insomnia, fatigue, feelings of worthlessness/guilt, difficulty concentrating and hopelessness. She denies hypersomnia, impaired memory and recurrent thoughts of death.   She experiences the following side effects from the treatment: none. Anxiety Review:  Patient is seen for sleep disturbance. Current treatment includes doxepin and no other therapies. Ongoing symptoms include: insomnia. Patient denies: palpitations, sweating, chest pain, shortness of breath, dizziness, paresthesias, racing thoughts, psychomotor agitation, feelings of losing control, difficulty concentrating, suicidal ideation. Reported side effects from the treatment: none.     ROS   Constitutional: Negative for activity change and appetite change. HENT: Negative for drooling and facial swelling.    Eyes: Negative for pain and discharge. Respiratory: Negative for apnea and choking.    Cardiovascular: Negative for palpitations and leg swelling. Gastrointestinal: Positive for abdominal pain, diarrhea, nausea and vomiting. Negative for blood in stool and rectal pain. Endocrine: Negative for polydipsia and polyphagia.    Genitourinary: Negative for genital sores and hematuria. Musculoskeletal: Negative for gait problem and neck stiffness. Skin: Negative for color change and rash. Allergic/Immunologic: Negative for environmental allergies. Neurological: Negative for tremors. Hematological: Negative for adenopathy. Psychiatric/Behavioral: Negative for agitation and behavioral problems.     All other systems reviewed and are negative. Objective:  Vitals:    12/23/21 0811   BP: 110/83   Pulse: 90   Resp: 16   Temp: 97.9 °F (36.6 °C)   TempSrc: Temporal   SpO2: 98%   Weight: 149 lb (67.6 kg)   Height: 5' 4\" (1.626 m)   PainSc:   2   PainLoc: Abdomen     alert, well appearing, and in no distress, oriented to person, place, and time and normal appearing weight  Mental status - normal mood, behavior, speech, dress, motor activity, and thought processes  Chest - clear to auscultation, no wheezes, rales or rhonchi, symmetric air entry  Heart - normal rate, regular rhythm, normal S1, S2, no murmurs, rubs, clicks or gallops  Neurological - cranial nerves II through XII intact  Extremities - peripheral pulses normal, no pedal edema, no clubbing or cyanosis  Skin - normal coloration and turgor, no rashes, no suspicious skin lesions noted    LABS     TESTS      Assessment/Plan:    1. Steroid-induced diabetes mellitus, sequela ([facility]  Rewrite prescription for clarity and ease of use; goal hgb a1c <7  - dulaglutide (Trulicity) 1.5 MK/6.7 mL sub-q pen; 0.5 mL by SubCUTAneous route every seven (7) days. Dispense: 4 Each; Refill: 5    2. Groin pain, chronic, right  Continue pain management    3. Type 2 diabetes mellitus with hyperglycemia, without long-term current use of insulin (HCC)    - dulaglutide (Trulicity) 1.5 FR/9.8 mL sub-q pen; 0.5 mL by SubCUTAneous route every seven (7) days. Dispense: 4 Each; Refill: 5  - HEMOGLOBIN A1C WITH EAG; Future    4. Essential hypertension, benign  Goal <130/80    5.  Recurrent depression ([address]  Mood exacerbated by chronic illness and stress at work      Lab review: orders written for new lab studies as appropriate; see orders      I have discussed the diagnosis with the patient and the intended plan as seen in the above orders. The patient has received an after-visit summary and questions were answered concerning future plans. I have discussed medication side effects and warnings with the patient as well. I have reviewed the plan of care with the patient, accepted their input and they are in agreement with the treatment goals.

## 2022-01-11 ENCOUNTER — OFFICE VISIT (OUTPATIENT)
Dept: FAMILY MEDICINE CLINIC | Age: 62
End: 2022-01-11
Payer: COMMERCIAL

## 2022-01-11 VITALS
TEMPERATURE: 98.2 F | SYSTOLIC BLOOD PRESSURE: 116 MMHG | DIASTOLIC BLOOD PRESSURE: 80 MMHG | BODY MASS INDEX: 25.44 KG/M2 | OXYGEN SATURATION: 98 % | HEART RATE: 88 BPM | WEIGHT: 149 LBS | HEIGHT: 64 IN | RESPIRATION RATE: 16 BRPM

## 2022-01-11 DIAGNOSIS — I10 ESSENTIAL HYPERTENSION, BENIGN: ICD-10-CM

## 2022-01-11 DIAGNOSIS — T38.0X5S STEROID-INDUCED DIABETES MELLITUS, SEQUELA (HCC): ICD-10-CM

## 2022-01-11 DIAGNOSIS — F33.9 RECURRENT DEPRESSION (HCC): ICD-10-CM

## 2022-01-11 DIAGNOSIS — E11.65 TYPE 2 DIABETES MELLITUS WITH HYPERGLYCEMIA, WITHOUT LONG-TERM CURRENT USE OF INSULIN (HCC): ICD-10-CM

## 2022-01-11 DIAGNOSIS — J40 BRONCHITIS: Primary | ICD-10-CM

## 2022-01-11 DIAGNOSIS — E09.9 STEROID-INDUCED DIABETES MELLITUS, SEQUELA (HCC): ICD-10-CM

## 2022-01-11 PROCEDURE — 99214 OFFICE O/P EST MOD 30 MIN: CPT | Performed by: FAMILY MEDICINE

## 2022-01-11 RX ORDER — BENZONATATE 200 MG/1
200 CAPSULE ORAL
Qty: 21 CAPSULE | Refills: 0 | Status: SHIPPED | OUTPATIENT
Start: 2022-01-11 | End: 2022-01-18

## 2022-01-11 RX ORDER — AZITHROMYCIN 250 MG/1
TABLET, FILM COATED ORAL
Qty: 6 TABLET | Refills: 0 | Status: SHIPPED | OUTPATIENT
Start: 2022-01-11 | End: 2022-02-09 | Stop reason: ALTCHOICE

## 2022-01-11 NOTE — PROGRESS NOTES
Megha Madison presents today for   Chief Complaint   Patient presents with    Cold Symptoms       Is someone accompanying this pt? Yes her mother    Is the patient using any DME equipment during [address]? no    Depression Screening:  3 most recent PHQ Screens 1/11/2022   Little interest or pleasure in doing things Not at all   Feeling down, depressed, irritable, or hopeless Not at all   Total Score PHQ 2 0       Learning Assessment:  Learning Assessment 1/27/2021   PRIMARY LEARNER Patient   HIGHEST LEVEL OF EDUCATION - PRIMARY LEARNER  -   BARRIERS PRIMARY LEARNER -   CO-LEARNER CAREGIVER -   PRIMARY LANGUAGE ENGLISH   LEARNER PREFERENCE PRIMARY DEMONSTRATION   ANSWERED BY patient   RELATIONSHIP SELF       Abuse Screening:  Abuse Screening Questionnaire 10/14/2020   Do you ever feel afraid of your partner? N   Are you in a relationship with someone who physically or mentally threatens you? N   Is it safe for you to go home? Y       Fall Risk  Fall Risk Assessment, last 12 mths 10/14/2020   Able to walk? Yes   Fall in past 12 months? No   Number of falls in past 12 months -   Fall with injury? -       Health Maintenance reviewed and discussed and ordered per Provider. Health Maintenance Due   Topic Date Due    DTaP/Tdap/Td series (1 - Tdap) Never done    Shingrix Vaccine Age 50> (1 of 2) Never done    Eye Exam Retinal or Dilated  05/29/2019    COVID-19 Vaccine (3 - Booster for Moderna series) 09/12/2021    Foot Exam Q1  01/27/2022   . Coordination of Care:  1. Have you been to the ER, urgent care clinic since your last visit? Hospitalized since your last visit? no    2. Have you seen or consulted any other health care providers outside of the [address] [place] [address] since your last visit? Include any pap smears or colon screening. no      Last  Checked na  Last UDS Checked na  Last Pain contract signed: na    Patient presents in office today for routine care.   Patient concerns: cold symptoms

## 2022-01-11 NOTE — LETTER
NOTIFICATION RETURN TO WORK     1/11/2022 2:44 PM    [address] [id number]      To Whom It May Concern:    Pema Henley is currently under the care of [facility]. She will return to work on: 1/13/2022    If there are questions or concerns please have the patient contact our office.         Sincerely,      Tiffany Baron MD

## 2022-01-11 NOTE — PROGRESS NOTES
Valerie Aaron is a 64 y.o.  female and presents with    Chief Complaint   Patient presents with    Cold Symptoms     Subjective:  Acute Bronchitis  Patient presents for presents evaluation of chills, dyspnea, headache, nasal congestion, productive cough and sore throat. Symptoms began 1 day ago and are gradually worsening since that time. Past history is significant for occasional episodes of bronchitis. She has decreased appetite and sweats. Diabetes Mellitus:  She has diabetes mellitus, and  Hyperlipidemia.  she has not monitored her glucose control. Diabetic ROS - medication compliance: compliant most of the time, diabetic diet compliance: noncompliant some of the time. Lab review: labs reviewed, I note that glycosylated hemoglobin is due in 1 month.     Depression Review:  Patient is seen for followup of depression. Treatment includes none and no other therapies. Ongoing symptoms include depressed mood, anhedonia, insomnia, fatigue, feelings of worthlessness/guilt, difficulty concentrating and hopelessness. She denies hypersomnia, impaired memory and recurrent thoughts of death.   She experiences the following side effects from the treatment: none. Anxiety Review:  Patient is seen for sleep disturbance. Current treatment includes doxepin and no other therapies. Ongoing symptoms include: insomnia. Patient denies: palpitations, sweating, chest pain, shortness of breath, dizziness, paresthesias, racing thoughts, psychomotor agitation, feelings of losing control, difficulty concentrating, suicidal ideation. Reported side effects from the treatment: none.     ROS   Constitutional: Negative for activity change and appetite change. HENT: Negative for drooling and facial swelling.    Eyes: Negative for pain and discharge. Respiratory: Negative for apnea and choking.    Cardiovascular: Negative for palpitations and leg swelling.    Gastrointestinal: Positive for abdominal pain, diarrhea, nausea and vomiting. Negative for blood in stool and rectal pain. Endocrine: Negative for polydipsia and polyphagia. Genitourinary: Negative for genital sores and hematuria. Musculoskeletal: Negative for gait problem and neck stiffness. Skin: Negative for color change and rash. Allergic/Immunologic: Negative for environmental allergies. Neurological: Negative for tremors. Hematological: Negative for adenopathy. Psychiatric/Behavioral: Negative for agitation and behavioral problems    All other systems reviewed and are negative. Objective:  Vitals:    01/11/22 1359   BP: 116/80   Pulse: 88   Resp: 16   Temp: 98.2 °F (36.8 °C)   TempSrc: Temporal   SpO2: 98%   Weight: 149 lb (67.6 kg)   Height: 5' 4\" (1.626 m)   PainSc:   0 - No pain       alert, ill appearing, and in no distress, oriented to person, place, and time and normal appearing weight  Mental status - drowsy  Chest - wheezing noted bilateral  Heart - normal rate, regular rhythm, normal S1, S2, no murmurs, rubs, clicks or gallops  Neurological - cranial nerves II through XII intact    LABS     TESTS      Assessment/Plan:    1. Bronchitis  Start treatment  - benzonatate (TESSALON) 200 mg capsule; Take 1 Capsule by mouth three (3) times daily as needed for Cough for up to 7 days. Dispense: 21 Capsule; Refill: 0  - azithromycin (ZITHROMAX) 250 mg tablet; 2 today, then 1 daily till gone. Dispense: 6 Tablet; Refill: 0    2. Steroid-induced diabetes mellitus, sequela (HCC)  Continue medications    3. Type 2 diabetes mellitus with hyperglycemia, without long-term current use of insulin (HCC)  Encourage low carb diet and exercise as tolerated when health improves    4. Essential hypertension, benign  Goal <130/80    5.  Recurrent depression ([facility] [address]  Mood exacerbated by current condition      Lab review: no lab studies available for review at time of visit      I have discussed the diagnosis with the patient and the intended plan as seen in the above orders. The patient has received an after-visit summary and questions were answered concerning future plans. I have discussed medication side effects and warnings with the patient as well. I have reviewed the plan of care with the patient, accepted their input and they are in agreement with the treatment goals. Jo Smith

## 2022-01-21 ENCOUNTER — HOSPITAL ENCOUNTER (OUTPATIENT)
Dept: LAB | Age: 62
Discharge: HOME | End: 2022-01-21
Payer: COMMERCIAL

## 2022-01-21 LAB
EST. AVERAGE GLUCOSE BLD GHB EST-MCNC: 206 MG/DL
HBA1C MFR BLD: 8.8 % (ref 4.2–5.6)

## 2022-01-21 PROCEDURE — 83036 HEMOGLOBIN GLYCOSYLATED A1C: CPT

## 2022-01-21 PROCEDURE — 36415 COLL VENOUS BLD VENIPUNCTURE: CPT

## 2022-01-24 ENCOUNTER — OFFICE VISIT (OUTPATIENT)
Dept: FAMILY MEDICINE CLINIC | Age: 62
End: 2022-01-24
Payer: COMMERCIAL

## 2022-01-24 VITALS
DIASTOLIC BLOOD PRESSURE: 90 MMHG | BODY MASS INDEX: 26.46 KG/M2 | HEIGHT: 64 IN | HEART RATE: 90 BPM | SYSTOLIC BLOOD PRESSURE: 140 MMHG | TEMPERATURE: 97.9 F | RESPIRATION RATE: 16 BRPM | OXYGEN SATURATION: 98 % | WEIGHT: 155 LBS

## 2022-01-24 DIAGNOSIS — M75.41 SHOULDER IMPINGEMENT, RIGHT: Primary | ICD-10-CM

## 2022-01-24 DIAGNOSIS — I10 ESSENTIAL HYPERTENSION, BENIGN: ICD-10-CM

## 2022-01-24 DIAGNOSIS — E11.65 TYPE 2 DIABETES MELLITUS WITH HYPERGLYCEMIA, WITHOUT LONG-TERM CURRENT USE OF INSULIN (HCC): ICD-10-CM

## 2022-01-24 PROCEDURE — 3052F HG A1C>EQUAL 8.0%<EQUAL 9.0%: CPT | Performed by: FAMILY MEDICINE

## 2022-01-24 PROCEDURE — 99214 OFFICE O/P EST MOD 30 MIN: CPT | Performed by: FAMILY MEDICINE

## 2022-01-24 NOTE — PROGRESS NOTES
Ginger Aschoff is a 64 y.o.  female and presents with    Chief Complaint   Patient presents with    Cough    Asthma    Hypertension    Diabetes    Shoulder Pain       Subjective:  Shoulder Pain  Patient complains of right side shoulder pain. The symptoms began several weeks ago Course of symptoms since onset has been gradually worsening. Pain is described as overall severity = moderate. Symptoms were incited by no known event. Patient denies hematemesis, melena, fever. Therapy to date includes rest, ice, OTC analgesics: somewhat effective and massage. Diabetes Mellitus:  She has diabetes mellitus, and  Hyperlipidemia.  she has started trulicity; she has monitored her blood glucose and it is 120-140  Diabetic ROS - medication compliance: compliant most of the time, diabetic diet compliance: noncompliant some of the time. Lab review: labs reviewed, I note that glycosylated hemoglobin is up to date     Depression Review:  Patient is seen for followup of depression. Treatment includes none and no other therapies. Ongoing symptoms include depressed mood, anhedonia, insomnia, fatigue, feelings of worthlessness/guilt, difficulty concentrating and hopelessness. She denies hypersomnia, impaired memory and recurrent thoughts of death.   She experiences the following side effects from the treatment: none. Anxiety Review:  Patient is seen for sleep disturbance. Current treatment includes doxepin and no other therapies. Ongoing symptoms include: insomnia. Patient denies: palpitations, sweating, chest pain, shortness of breath, dizziness, paresthesias, racing thoughts, psychomotor agitation, feelings of losing control, difficulty concentrating, suicidal ideation. Reported side effects from the treatment: none.     ROS   Constitutional: Negative for activity change and appetite change. HENT: Negative for drooling and facial swelling.    Eyes: Negative for pain and discharge.    Respiratory: Negative for apnea and choking.    Cardiovascular: Negative for palpitations and leg swelling. Gastrointestinal: Positive for abdominal pain, diarrhea, nausea and vomiting. Negative for blood in stool and rectal pain. Endocrine: Negative for polydipsia and polyphagia. Genitourinary: Negative for genital sores and hematuria. Musculoskeletal: Negative for gait problem and neck stiffness. Skin: Negative for color change and rash. Allergic/Immunologic: Negative for environmental allergies. Neurological: Negative for tremors. Hematological: Negative for adenopathy. Psychiatric/Behavioral: Negative for agitation and behavioral problems. All other systems reviewed and are negative. Objective:  Vitals:    01/24/22 1540   BP: (!) 140/90   Pulse: 90   Resp: 16   Temp: 97.9 °F (36.6 °C)   TempSrc: Temporal   SpO2: 98%   Weight: 155 lb (70.3 kg)   Height: 5' 4\" (1.626 m)   PainSc:   0 - No pain       alert, well appearing, and in no distress, oriented to person, place, and time and normal appearing weight  Mental status - normal mood, behavior, speech, dress, motor activity, and thought processes  Chest - clear to auscultation, no wheezes, rales or rhonchi, symmetric air entry  Heart - normal rate, regular rhythm, normal S1, S2, no murmurs, rubs, clicks or gallops  Musculoskeletal - abnormal exam of left shoulder with + impingement signs    LABS   hgb a1c 8.8  TESTS      Assessment/Plan:    1. Shoulder impingement, right  Exercises demonstrated    2. Type 2 diabetes mellitus with hyperglycemia, without long-term current use of insulin ([facility])  Goal  hgb a1c <7; not at goal; continue with trulicity    3. Essential hypertension, benign  Goal <130/80; not at goal; encourage continue treatment      Lab review: labs reviewed, I note that glycosylated hemoglobin abnormal but improved      I have discussed the diagnosis with the patient and the intended plan as seen in the above orders.   The patient has received an after-visit summary and questions were answered concerning future plans. I have discussed medication side effects and warnings with the patient as well. I have reviewed the plan of care with the patient, accepted their input and they are in agreement with the treatment goals.

## 2022-01-24 NOTE — PROGRESS NOTES
Amber Elizalde presents today for   Chief Complaint   Patient presents with    Cough    Asthma    Hypertension    Diabetes       Is someone accompanying this pt? no    Is the patient using any DME equipment during OV? no    Depression Screening:  3 most recent PHQ Screens 1/24/2022   Little interest or pleasure in doing things Not at all   Feeling down, depressed, irritable, or hopeless Not at all   Total Score PHQ 2 0       Learning Assessment:  Learning Assessment 1/27/2021   PRIMARY LEARNER Patient   HIGHEST LEVEL OF EDUCATION - PRIMARY LEARNER  -   BARRIERS PRIMARY LEARNER -   CO-LEARNER CAREGIVER -   PRIMARY LANGUAGE ENGLISH   LEARNER PREFERENCE PRIMARY DEMONSTRATION   ANSWERED BY patient   RELATIONSHIP SELF       Abuse Screening:  Abuse Screening Questionnaire 10/14/2020   Do you ever feel afraid of your partner? N   Are you in a relationship with someone who physically or mentally threatens you? N   Is it safe for you to go home? Y       Fall Risk  Fall Risk Assessment, last 12 mths 10/14/2020   Able to walk? Yes   Fall in past 12 months? No   Number of falls in past 12 months -   Fall with injury? -       Health Maintenance reviewed and discussed and ordered per Provider. Health Maintenance Due   Topic Date Due    DTaP/Tdap/Td series (1 - Tdap) Never done    Shingrix Vaccine Age 50> (1 of 2) Never done    Eye Exam Retinal or Dilated  05/29/2019    COVID-19 Vaccine (3 - Booster for Moderna series) 08/12/2021    Foot Exam Q1  01/27/2022   . Coordination of Care:  1. Have you been to the ER, urgent care clinic since your last visit? Hospitalized since your last visit? no    2. Have you seen or consulted any other health care providers outside of the [address] [place] [address] since your last visit? Include any pap smears or colon screening. no      Last  Checked na  Last UDS Checked na  Last Pain contract signed: na    Patient presents in office today for routine care.   Patient concerns: cough

## 2022-02-09 ENCOUNTER — OFFICE VISIT (OUTPATIENT)
Dept: FAMILY MEDICINE CLINIC | Age: 62
End: 2022-02-09
Payer: COMMERCIAL

## 2022-02-09 VITALS
SYSTOLIC BLOOD PRESSURE: 140 MMHG | RESPIRATION RATE: 16 BRPM | TEMPERATURE: 97.9 F | BODY MASS INDEX: 27.28 KG/M2 | HEIGHT: 64 IN | DIASTOLIC BLOOD PRESSURE: 91 MMHG | OXYGEN SATURATION: 98 % | HEART RATE: 95 BPM | WEIGHT: 159.8 LBS

## 2022-02-09 DIAGNOSIS — M25.511 CHRONIC RIGHT SHOULDER PAIN: ICD-10-CM

## 2022-02-09 DIAGNOSIS — M25.50 MULTIPLE JOINT PAIN: ICD-10-CM

## 2022-02-09 DIAGNOSIS — G89.29 CHRONIC RIGHT SHOULDER PAIN: ICD-10-CM

## 2022-02-09 DIAGNOSIS — M75.41 SHOULDER IMPINGEMENT, RIGHT: Primary | ICD-10-CM

## 2022-02-09 PROCEDURE — 99213 OFFICE O/P EST LOW 20 MIN: CPT | Performed by: NURSE PRACTITIONER

## 2022-02-09 RX ORDER — HYDROCODONE BITARTRATE AND ACETAMINOPHEN 5; 325 MG/1; MG/1
1 TABLET ORAL
Qty: 15 TABLET | Refills: 0 | Status: SHIPPED | OUTPATIENT
Start: 2022-02-09 | End: 2022-05-23 | Stop reason: SDUPTHER

## 2022-02-09 NOTE — PROGRESS NOTES
Eleazar Motta is a 58 y.o. female (: 1960) presenting to address:    Chief Complaint   Patient presents with    Follow-up       There were no vitals filed for this visit. Hearing/Vision:   No exam data present    Learning Assessment:     Learning Assessment 2021   PRIMARY LEARNER Patient   HIGHEST LEVEL OF EDUCATION - PRIMARY LEARNER  -   BARRIERS PRIMARY LEARNER -   CO-LEARNER CAREGIVER -   PRIMARY LANGUAGE ENGLISH   LEARNER PREFERENCE PRIMARY DEMONSTRATION   ANSWERED BY patient   RELATIONSHIP SELF     Depression Screening:     3 most recent PHQ Screens 2022   Little interest or pleasure in doing things Not at all   Feeling down, depressed, irritable, or hopeless Not at all   Total Score PHQ 2 0     Fall Risk Assessment:     Fall Risk Assessment, last 12 mths 10/14/2020   Able to walk? Yes   Fall in past 12 months? No   Number of falls in past 12 months -   Fall with injury? -     Abuse Screening:     Abuse Screening Questionnaire 10/14/2020   Do you ever feel afraid of your partner? N   Are you in a relationship with someone who physically or mentally threatens you? N   Is it safe for you to go home? Y     ADL Assessment:     ADL Assessment 2019   Feeding yourself No Help Needed   Getting from bed to chair No Help Needed   Getting dressed No Help Needed   Bathing or showering No Help Needed   Walk across the room (includes cane/walker) No Help Needed   Using the telphone No Help Needed   Taking your medications No Help Needed   Preparing meals No Help Needed   Managing money (expenses/bills) No Help Needed   Moderately strenuous housework (laundry) No Help Needed   Shopping for personal items (toiletries/medicines) No Help Needed   Shopping for groceries No Help Needed   Driving Help Needed   Climbing a flight of stairs Help Needed   Getting to places beyond walking distances Help Needed        Coordination of Care Questionaire:   1.  \"Have you been to the ER, urgent care clinic since your last visit? Hospitalized since your last visit? \" Yes 3 weeks ago was in the hospital for pancreaitis     2. \"Have you seen or consulted any other health care providers outside of the [address] [place] [address] since your last visit? \" No     3. For patients aged 39-70: Has the patient had a colonoscopy? No     If the patient is female:    4. For patients aged 41-77: Has the patient had a mammogram within the past 2 years? No    5. For patients aged 21-65: Has the patient had a pap smear? No    Advanced Directive:   1. Do you have an Advanced Directive? NO    2. Would you like information on Advanced Directives?  NO

## 2022-02-09 NOTE — PROGRESS NOTES
OFFICE NOTE    Berny Waddell is a 58 y.o. female presenting today for office visit. 2/9/2022  3:17 PM    Chief Complaint   Patient presents with    Follow-up       HPI:   In office visit related all over body pain. Patient says she is doing okay and she looks well. She says at one time she was thought to have rheumatoid arthritis. Patient thinks right shoulder injections may help with her pain. Patient states she has been doing right shoulder exercises related to shoulder impingement with no improvement. Ordered referral to Ortho. Patient reports appetite is good, no urinary issues, sleeps well and regular bowel movements. Patient denies fever, chills, chest pain, shortness of breath, abdomen pain or dark tarry stool. ROS:    · General: negative for - chills, fever, weight changes or malaise  · HEENT: no sore throat, nasal congestion, vision problems or ear problems  · Respiratory: no cough, shortness of breath, or wheezing  · Cardiovascular: no chest pain, palpitations, or dyspnea on exertion  · Gastrointestinal: no abdominal pain, N/V, change in bowel habits, or black or bloody stools  · Musculoskeletal: Positive for multiple joint pain and right shoulder pain, negative for muscle weakness  · Neurological: no numbness, tingling, headache or dizziness  · Endo:  No polyuria or polydipsia. · : no hematuria, dysuria, frequency, hesitancy, or nocturia.     · Skin: No itching or rash, no open skin, no unusual lesions   · Psychological: negative for - anxiety, depression, sleep disturbances, suicidal or homicidal ideations     PHQ Screening   3 most recent PHQ Screens 2/9/2022   Little interest or pleasure in doing things Not at all   Feeling down, depressed, irritable, or hopeless Not at all   Total Score PHQ 2 0       History  Past Medical History:   Diagnosis Date    Abdominal adhesions     Adnexal cyst 7/30/2019    Left    Anemia     Asthma     Chronic pelvic pain in female 7/30/2019    Diabetes mellitus     Dyskinesia     bilateral    Essential hypertension     GERD (gastroesophageal reflux disease)     Hypertension     Menopause     Microhematuria     Rheumatoid arteritis ([facility] [address] 2018    Stool color black        Past Surgical History:   Procedure Laterality Date    COLONOSCOPY N/A 1/21/2019    [facility]    HX CHOLECYSTECTOMY  6/28/10    HX COLONOSCOPY      HX ENDOSCOPY      HX HERNIA REPAIR      HX HYSTERECTOMY  1989    Fibroids    HX KNEE REPLACEMENT Left     HX KNEE REPLACEMENT Right 06/2018    HX OOPHORECTOMY  12/2000    HX ORTHOPAEDIC Right     ankle- multiple surgeries    HX SMALL BOWEL RESECTION  12/2000    HX SMALL BOWEL RESECTION  02/21/2017    Dr. Valentin Silverman History     Socioeconomic History    Marital status:      Spouse name: Not on file    Number of children: Not on file    Years of education: Not on file    Highest education level: Not on file   Occupational History    Not on file   Tobacco Use    Smoking status: Never Smoker    Smokeless tobacco: Never Used   Substance and Sexual Activity    Alcohol use: No    Drug use: No    Sexual activity: Yes     Partners: Male     Birth control/protection: None   Other Topics Concern    Not on file   Social History Narrative    Not on file     Social Determinants of Health     Financial Resource Strain:     Difficulty of Paying Living Expenses: Not on file   Food Insecurity:     Worried About Running Out of Food in the Last Year: Not on file    Dilshad of Food in the Last Year: Not on file   Transportation Needs:     Lack of Transportation (Medical): Not on file    Lack of Transportation (Non-Medical):  Not on file   Physical Activity:     Days of Exercise per Week: Not on file    Minutes of Exercise per Session: Not on file   Stress:     Feeling of Stress : Not on file   Social Connections:     Frequency of Communication with Friends and Family: Not on file    Frequency of Social Gatherings with Friends and Family: Not on file    Attends [employer] Services: Not on file    Active Member of Clubs or Organizations: Not on file    Attends Club or Organization Meetings: Not on file    Marital Status: Not on file   Intimate Partner Violence:     Fear of Current or Ex-Partner: Not on file    Emotionally Abused: Not on file    Physically Abused: Not on file    Sexually Abused: Not on file   Housing Stability:     Unable to Pay for Housing in the Last Year: Not on file    Number of [place] in the Last Year: Not on file    Unstable Housing in the Last Year: Not on file       Allergies   Allergen Reactions    Macrodantin [Nitrofurantoin Macrocrystalline] Itching and Other (comments)    Tape [Adhesive] Other (comments)     Paper tape-- feels like burning skin  Burned skin when had wound vac       Current Outpatient Medications   Medication Sig Dispense Refill    dulaglutide (Trulicity) 1.5 JF/5.4 mL sub-q pen 0.5 mL by SubCUTAneous route every seven (7) days. 4 Each 5    albuterol sulfate (ProAir RespiClick) 90 mcg/actuation breath activated inhaler Take 2 Puffs by inhalation every four (4) hours as needed for Wheezing, Shortness of Breath or Cough. 2 Each 2    doxepin (SINEquan) 50 mg capsule Take 1 Capsule by mouth nightly. 90 Capsule 3    amLODIPine (NORVASC) 5 mg tablet Take 1 Tablet by mouth daily. 90 Tablet 3    naproxen (NAPROSYN) 500 mg tablet Take 1 Tablet by mouth two (2) times daily (with meals). 60 Tablet 0    bisoprolol-hydroCHLOROthiazide (ZIAC) 2.5-6.25 mg per tablet take 1 tablet by mouth once daily 90 Tablet 3    dapagliflozin (Farxiga) 10 mg tab tablet Take 1 Tablet by mouth daily. 90 Tablet 3    ondansetron (Zofran ODT) 4 mg disintegrating tablet Take 1 tablets every 6-8 hours as needed for nausea and vomiting.  30 Tab 1    Insulin Needles, Disposable, (Nia Pen Needle) 32 gauge x 5/32\" ndle Give injection once a week 30 Pen Needle 2    albuterol (PROVENTIL VENTOLIN) 2.5 mg /3 mL (0.083 %) nebu 3 mL by Nebulization route every six (6) hours as needed for Wheezing. 30 Nebule 1    glucose blood VI test strips (OneTouch Ultra Test) strip Check blood glucose as directed 100 Strip 1    multivitamin (ONE A DAY) tablet Take 1 Tab by mouth daily. Patient Care Team:  Patient Care Team:  Arun Kidd MD as PCP - General (Family Medicine)  Arun Kidd MD as PCP - [facility]  Tray Allen MD (Nephrology)  Leslie Watson MD (Surgery)  Ethan Senior MD (Gastroenterology)    Physical Exam  Patient appears well, she is pleasant, alert, oriented x 3, in no distress. ENT normal.  Neck supple. No adenopathy or thyromegaly. BINH. Lungs are clear, good air entry, no wheezes, rhonchi or rales. Cardiovascular, S1 and S2 normal, no murmurs, regular rate and rhythm. Chest wall negative for tenderness  Abdomen is soft without tenderness   /Anorectal, deferred. Muscleskeletal, no swelling, no tenderness, appears to have full range of motion of right shoulder  Extremities show no edema  Neurological is normal without focal findings. Skin: no concerning lesions. Psych: normal affect. Mood good. Oriented x 3. Judgement and insight intact. Vitals:    02/09/22 1533 02/09/22 1536   BP: (!) 151/98 (!) 140/91   Pulse: 95    Resp: 16    Temp: 97.9 °F (36.6 °C)    SpO2: 98%    Weight: 159 lb 12.8 oz (72.5 kg)    Height: 5' 4\" (1.626 m)    PainSc:  10 - Worst pain ever        Assessment and Plan    Diagnoses and all orders for this visit:    Shoulder impingement, right  -     HYDROcodone-acetaminophen (NORCO) 5-325 mg per tablet; Take 1 Tablet by mouth every eight (8) hours as needed for Pain for up to 5 days.  Max Daily Amount: 3 Tablets., Normal, Disp-15 Tablet, R-0  -     REFERRAL TO ORTHOPEDIC SURGERY    Multiple joint pain  - HYDROcodone-acetaminophen (NORCO) 5-325 mg per tablet; Take 1 Tablet by mouth every eight (8) hours as needed for Pain for up to 5 days. Max Daily Amount: 3 Tablets., Normal, Disp-15 Tablet, R-0    Chronic right shoulder pain  -     REFERRAL TO ORTHOPEDIC SURGERY       *Plan of care reviewed with patient. Patient in agreement with plan and expresses understanding. All questions answered and patient encouraged to call or RTO if further questions or concerns. 50% of 20 minutes spent on counseling and managing her care. Follow-up and Dispositions    · Return if symptoms worsen or fail to improve, for Follow-up with Dr. Desiree De Anda.        Ashok Guaman NP-C

## 2022-03-04 ENCOUNTER — HOSPITAL ENCOUNTER (OUTPATIENT)
Dept: WOMENS IMAGING | Age: 62
Discharge: HOME | End: 2022-03-04
Attending: FAMILY MEDICINE
Payer: COMMERCIAL

## 2022-03-04 DIAGNOSIS — Z12.31 ENCOUNTER FOR SCREENING MAMMOGRAM FOR BREAST CANCER: ICD-10-CM

## 2022-03-04 PROCEDURE — 77063 BREAST TOMOSYNTHESIS BI: CPT

## 2022-03-18 PROBLEM — G89.18 POST-OPERATIVE PAIN: Status: ACTIVE | Noted: 2017-03-14

## 2022-03-19 PROBLEM — D64.9 ANEMIA: Status: ACTIVE | Noted: 2019-08-10

## 2022-03-19 PROBLEM — G89.29 CHRONIC ABDOMINAL PAIN: Status: ACTIVE | Noted: 2017-03-14

## 2022-03-19 PROBLEM — K56.609 SMALL BOWEL OBSTRUCTION (HCC): Status: ACTIVE | Noted: 2020-09-11

## 2022-03-19 PROBLEM — N94.9 ADNEXAL CYST: Status: ACTIVE | Noted: 2019-07-30

## 2022-03-19 PROBLEM — K56.609 SBO (SMALL BOWEL OBSTRUCTION) (HCC): Status: ACTIVE | Noted: 2017-02-21

## 2022-03-19 PROBLEM — R10.2 CHRONIC PELVIC PAIN IN FEMALE: Status: ACTIVE | Noted: 2019-07-30

## 2022-03-19 PROBLEM — R11.15 NON-INTRACTABLE CYCLICAL VOMITING WITH NAUSEA: Status: ACTIVE | Noted: 2018-01-24

## 2022-03-19 PROBLEM — R10.9 ABDOMINAL PAIN: Status: ACTIVE | Noted: 2020-10-21

## 2022-03-19 PROBLEM — Z90.49 S/P SMALL BOWEL RESECTION: Status: ACTIVE | Noted: 2019-08-01

## 2022-03-19 PROBLEM — K56.609 BOWEL OBSTRUCTION (HCC): Status: ACTIVE | Noted: 2018-08-23

## 2022-03-19 PROBLEM — E11.21 TYPE 2 DIABETES MELLITUS WITH NEPHROPATHY (HCC): Status: ACTIVE | Noted: 2018-01-19

## 2022-03-19 PROBLEM — G89.29 CHRONIC PELVIC PAIN IN FEMALE: Status: ACTIVE | Noted: 2019-07-30

## 2022-03-19 PROBLEM — R10.9 CHRONIC ABDOMINAL PAIN: Status: ACTIVE | Noted: 2017-03-14

## 2022-04-04 NOTE — LETTER
Bedside and Verbal shift change report given to [address] [place] [address]  (oncoming nurse) by Nanci Armendariz  (offgoing nurse). Report included the following information SBAR, Kardex, Procedure Summary, Intake/Output and Recent Results. Patient alert and responsive during shift change no distress noted. 2145  Patient alert and responsive pt resting in bed no distress not  Hospitalist ordered Farah due to request from night nurse, patient has not eliminated urine since 4/2/2022 over night, patient has been bladder scanned, 417ml. Farah placed with two license professionals. No distress noted patient later thanked us or placement of farah. Patient Farah cath placed with 550 ml  Output. Patient tolerates Farah no distress noted     0610  Patient alert and responsive pt notes no signs of distress, patient blood sugar 54 retake one min apart 73 patient given orange juice with change in blood sugar of 77 hospitalist made aware no further actions required for nurse at the time. consult for Nephro in place, no other actions required. Event passed onto day shift nurse. Bedside and Verbal shift change report given to Willis BLAND  (oncoming nurse) by Yesi Berry RN  (offgoing nurse). Report included the following information SBAR, Kardex and Intake/Output. NOTIFICATION RETURN TO WORK / SCHOOL 
 
5/2/2017 11:50 PM 
 
Ms. Christiano Riley [address] [facility] [id number] To Whom It May Concern: 
 
Christiano Riley is currently under the care of [facility] EMERGENCY DEPT. She will return to work/school on: 5/8/17 If there are questions or concerns please have the patient contact our office. Sincerely, Braxton Valle MD

## 2022-05-23 ENCOUNTER — OFFICE VISIT (OUTPATIENT)
Dept: FAMILY MEDICINE CLINIC | Age: 62
End: 2022-05-23
Payer: COMMERCIAL

## 2022-05-23 VITALS
OXYGEN SATURATION: 99 % | HEIGHT: 64 IN | SYSTOLIC BLOOD PRESSURE: 121 MMHG | BODY MASS INDEX: 26.5 KG/M2 | RESPIRATION RATE: 17 BRPM | WEIGHT: 155.2 LBS | TEMPERATURE: 97.4 F | DIASTOLIC BLOOD PRESSURE: 81 MMHG | HEART RATE: 88 BPM

## 2022-05-23 DIAGNOSIS — M54.50 ACUTE RIGHT-SIDED LOW BACK PAIN WITHOUT SCIATICA: ICD-10-CM

## 2022-05-23 DIAGNOSIS — M25.50 MULTIPLE JOINT PAIN: ICD-10-CM

## 2022-05-23 DIAGNOSIS — E11.65 TYPE 2 DIABETES MELLITUS WITH HYPERGLYCEMIA, WITHOUT LONG-TERM CURRENT USE OF INSULIN (HCC): Primary | ICD-10-CM

## 2022-05-23 DIAGNOSIS — F43.21 GRIEVING: ICD-10-CM

## 2022-05-23 DIAGNOSIS — F33.9 RECURRENT DEPRESSION (HCC): ICD-10-CM

## 2022-05-23 DIAGNOSIS — M75.41 SHOULDER IMPINGEMENT, RIGHT: ICD-10-CM

## 2022-05-23 LAB — HBA1C MFR BLD HPLC: 7.7 %

## 2022-05-23 PROCEDURE — 83036 HEMOGLOBIN GLYCOSYLATED A1C: CPT | Performed by: FAMILY MEDICINE

## 2022-05-23 PROCEDURE — 3051F HG A1C>EQUAL 7.0%<8.0%: CPT | Performed by: FAMILY MEDICINE

## 2022-05-23 PROCEDURE — 99214 OFFICE O/P EST MOD 30 MIN: CPT | Performed by: FAMILY MEDICINE

## 2022-05-23 RX ORDER — HYDROCODONE BITARTRATE AND ACETAMINOPHEN 5; 325 MG/1; MG/1
1 TABLET ORAL
Qty: 15 TABLET | Refills: 0 | Status: SHIPPED | OUTPATIENT
Start: 2022-05-23 | End: 2022-05-28

## 2022-05-23 NOTE — PROGRESS NOTES
Bhumi Curry is a 58 y.o. female (: 1960) presenting to address:    Chief Complaint   Patient presents with    Labs    Medication Refill    Back Pain       There were no vitals filed for this visit. Hearing/Vision:   No exam data present    Learning Assessment:     Learning Assessment 2021   PRIMARY LEARNER Patient   HIGHEST LEVEL OF EDUCATION - PRIMARY LEARNER  -   BARRIERS PRIMARY LEARNER -   CO-LEARNER CAREGIVER -   PRIMARY LANGUAGE ENGLISH   LEARNER PREFERENCE PRIMARY DEMONSTRATION   ANSWERED BY patient   RELATIONSHIP SELF     Depression Screening:     3 most recent PHQ Screens 2022   Little interest or pleasure in doing things Not at all   Feeling down, depressed, irritable, or hopeless Not at all   Total Score PHQ 2 0     Fall Risk Assessment:     Fall Risk Assessment, last 12 mths 10/14/2020   Able to walk? Yes   Fall in past 12 months? No   Number of falls in past 12 months -   Fall with injury? -     Abuse Screening:     Abuse Screening Questionnaire 2022   Do you ever feel afraid of your partner? N   Are you in a relationship with someone who physically or mentally threatens you? N   Is it safe for you to go home? Y     ADL Assessment:     ADL Assessment 2019   Feeding yourself No Help Needed   Getting from bed to chair No Help Needed   Getting dressed No Help Needed   Bathing or showering No Help Needed   Walk across the room (includes cane/walker) No Help Needed   Using the telphone No Help Needed   Taking your medications No Help Needed   Preparing meals No Help Needed   Managing money (expenses/bills) No Help Needed   Moderately strenuous housework (laundry) No Help Needed   Shopping for personal items (toiletries/medicines) No Help Needed   Shopping for groceries No Help Needed   Driving Help Needed   Climbing a flight of stairs Help Needed   Getting to places beyond walking distances Help Needed        Coordination of Care Questionaire:     1.  \"Have you been to the ER, urgent care clinic since your last visit? Hospitalized since your last visit? \" No    2. \"Have you seen or consulted any other health care providers outside of the [address] [place] [address] since your last visit? \" No     3. For patients aged 39-70: Has the patient had a colonoscopy / FIT/ Cologuard? No      If the patient is female:    4. For patients aged 41-77: Has the patient had a mammogram within the past 2 years? Yes - no Care Gap present      5. For patients aged 21-65: Has the patient had a pap smear?  No

## 2022-05-23 NOTE — PROGRESS NOTES
Elisha Monterroso is a 58 y.o.  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 She has used tramadol without relief. Diabetes Mellitus:  She has diabetes mellitus, and  Hyperlipidemia.  she has not monitored her glucose control. Diabetic ROS - medication compliance: compliant most of the time, diabetic diet compliance: noncompliant some of the time. Lab review: labs reviewed, I note that glycosylated hemoglobin is due in 1 month.     Depression Review:  Patient is seen for followup of depression. Treatment includes none and no other therapies. Ongoing symptoms include depressed mood, anhedonia, insomnia, fatigue, feelings of worthlessness/guilt, difficulty concentrating and hopelessness. She denies hypersomnia, impaired memory and recurrent thoughts of death.   She experiences the following side effects from the treatment: none. Anxiety Review:  Patient is seen for sleep disturbance. Current treatment includes doxepin and no other therapies. Ongoing symptoms include: insomnia. Patient denies: palpitations, sweating, chest pain, shortness of breath, dizziness, paresthesias, racing thoughts, psychomotor agitation, feelings of losing control, difficulty concentrating, suicidal ideation. Reported side effects from the treatment: none.     ROS   Constitutional: Negative for activity change and appetite change. HENT: Negative for drooling and facial swelling.    Eyes: Negative for pain and discharge. Respiratory: Negative for apnea and choking.    Cardiovascular: Negative for palpitations and leg swelling. Gastrointestinal: Positive for abdominal pain, diarrhea, nausea and vomiting. Negative for blood in stool and rectal pain.    Endocrine: Negative for polydipsia and polyphagia. Genitourinary: Negative for genital sores and hematuria. Musculoskeletal: Negative for gait problem and neck stiffness. Skin: Negative for color change and rash. Allergic/Immunologic: Negative for environmental allergies. Neurological: Negative for tremors. Hematological: Negative for adenopathy. Psychiatric/Behavioral: Negative for agitation and behavioral problems     All other systems reviewed and are negative. Objective:  Vitals:    05/23/22 1403   BP: 121/81   Pulse: 88   Resp: 17   Temp: 97.4 °F (36.3 °C)   TempSrc: Temporal   SpO2: 99%   Weight: 155 lb 3.2 oz (70.4 kg)   Height: 5' 4\" (1.626 m)       alert, well appearing, and in no distress, oriented to person, place, and time and normal appearing weight  Mental status - normal mood, behavior, speech, dress, motor activity, and thought processes  Chest - clear to auscultation, no wheezes, rales or rhonchi, symmetric air entry  Heart - normal rate, regular rhythm, normal S1, S2, no murmurs, rubs, clicks or gallops  Extremities - no pedal edema noted  Skin - normal coloration and turgor, no rashes, no suspicious skin lesions noted    LABS   hgb a1c 7.7  TESTS      Assessment/Plan:    1. Type 2 diabetes mellitus with hyperglycemia, without long-term current use of insulin ([facility])  Goal hgb a1c <7; borderline controlled but improved; continue treatment  - AMB POC HEMOGLOBIN A1C    2. Shoulder impingement, right  This is a chronic problem that is stable. Per review of available records and patients , there are not sign of overuse, misuse, diversion, or concerning side effects.  Today we reviewed: the risk of overdose, addiction, and dependency proper storage and disposal of medications the goals of treatment (improve functionality, quality of life, and pain)  The following changes were made to the patients current treatment plan: medications adjusted, see orders.          - HYDROcodone-acetaminophen ([address]) 5-325 mg per tablet; Take 1 Tablet by mouth every eight (8) hours as needed for Pain for up to 5 days. Max Daily Amount: 3 Tablets. Dispense: 15 Tablet; Refill: 0    3. Multiple joint pain    - HYDROcodone-acetaminophen (NORCO) 5-325 mg per tablet; Take 1 Tablet by mouth every eight (8) hours as needed for Pain for up to 5 days. Max Daily Amount: 3 Tablets. Dispense: 15 Tablet; Refill: 0    4. Acute right-sided low back pain without sciatica    - HYDROcodone-acetaminophen (NORCO) 5-325 mg per tablet; Take 1 Tablet by mouth every eight (8) hours as needed for Pain for up to 5 days. Max Daily Amount: 3 Tablets. Dispense: 15 Tablet; Refill: 0    5. Recurrent depression (HCC)  Mood improved    6. Grieving  Mother-in-law passed 2 weeks ago    Lab review: labs reviewed, I note that glycosylated hemoglobin mildly abnormal but acceptable    I have discussed the diagnosis with the patient and the intended plan as seen in the above orders. The patient has received an after-visit summary and questions were answered concerning future plans. I have discussed medication side effects and warnings with the patient as well. I have reviewed the plan of care with the patient, accepted their input and they are in agreement with the treatment goals.

## 2022-06-22 ENCOUNTER — PATIENT OUTREACH (OUTPATIENT)
Dept: CASE MANAGEMENT | Age: 62
End: 2022-06-22

## 2022-06-23 ENCOUNTER — PATIENT OUTREACH (OUTPATIENT)
Dept: CASE MANAGEMENT | Age: 62
End: 2022-06-23

## 2022-06-23 NOTE — PROGRESS NOTES
Ambulatory Care Coordination    2nd attempt to reach patient. Unable to leave message.   Harles Cockayne, LPN

## 2022-06-30 ENCOUNTER — PATIENT OUTREACH (OUTPATIENT)
Dept: CASE MANAGEMENT | Age: 62
End: 2022-06-30

## 2022-06-30 NOTE — PROGRESS NOTES
Ambulatory Care Coordination    3rd attempt to reach patient. Spoke with patient briefly. She declines CCM. States she is good. Patient denies needing any assistance at this time.   Jin Garcia LPN

## 2022-07-01 DIAGNOSIS — T38.0X5S STEROID-INDUCED DIABETES MELLITUS, SEQUELA (HCC): ICD-10-CM

## 2022-07-01 DIAGNOSIS — E09.9 STEROID-INDUCED DIABETES MELLITUS, SEQUELA (HCC): ICD-10-CM

## 2022-07-01 NOTE — TELEPHONE ENCOUNTER
Patient called stating, per insurance co., she is not able to pick-up the refill for [place] because the request was sent to the wrong pharmacy; so it looks like she already picked it up. They will not refill it again until August.    Patient is requesting medication refill for Farxiga. Also, pt states she is going out of town today around 1:00pm, and would like to know if Dr. Harry Rios would be able to refill this medication by then? Please follow-up with patient to advise at: [phone]. Requested Prescriptions     Pending Prescriptions Disp Refills    dapagliflozin (Farxiga) 10 mg tab tablet 90 Tablet 3     Sig: Take 1 Tablet by mouth daily.

## 2022-08-26 ENCOUNTER — OFFICE VISIT (OUTPATIENT)
Dept: FAMILY MEDICINE CLINIC | Age: 62
End: 2022-08-26
Payer: COMMERCIAL

## 2022-08-26 VITALS
DIASTOLIC BLOOD PRESSURE: 88 MMHG | TEMPERATURE: 97.2 F | HEIGHT: 64 IN | OXYGEN SATURATION: 96 % | WEIGHT: 148.8 LBS | SYSTOLIC BLOOD PRESSURE: 111 MMHG | RESPIRATION RATE: 17 BRPM | BODY MASS INDEX: 25.4 KG/M2 | HEART RATE: 94 BPM

## 2022-08-26 DIAGNOSIS — E11.65 TYPE 2 DIABETES MELLITUS WITH HYPERGLYCEMIA, WITHOUT LONG-TERM CURRENT USE OF INSULIN (HCC): ICD-10-CM

## 2022-08-26 DIAGNOSIS — I10 ESSENTIAL HYPERTENSION, BENIGN: ICD-10-CM

## 2022-08-26 DIAGNOSIS — S46.812A TRAUMATIC TEAR OF SUPRASPINATUS TENDON OF LEFT SHOULDER, INITIAL ENCOUNTER: Primary | ICD-10-CM

## 2022-08-26 DIAGNOSIS — Z01.818 PRE-OP EXAM: ICD-10-CM

## 2022-08-26 DIAGNOSIS — Z23 NEED FOR PROPHYLACTIC VACCINATION AGAINST STREPTOCOCCUS PNEUMONIAE (PNEUMOCOCCUS): ICD-10-CM

## 2022-08-26 PROCEDURE — 90471 IMMUNIZATION ADMIN: CPT | Performed by: FAMILY MEDICINE

## 2022-08-26 PROCEDURE — 99214 OFFICE O/P EST MOD 30 MIN: CPT | Performed by: FAMILY MEDICINE

## 2022-08-26 PROCEDURE — 90677 PCV20 VACCINE IM: CPT | Performed by: FAMILY MEDICINE

## 2022-08-26 PROCEDURE — 3051F HG A1C>EQUAL 7.0%<8.0%: CPT | Performed by: FAMILY MEDICINE

## 2022-08-26 NOTE — PROGRESS NOTES
Telly Partida is a 58 y.o.  female and presents with    Chief Complaint   Patient presents with    Pre-op Exam           Subjective:  Pre op Physical   Patient here for a pre op physical exam.The patient reports problems - left shoulder rotator cuff tear  Do you take any herbs or supplements that were not prescribed by a doctor? yes Are you taking calcium supplements? yes Are you taking aspirin daily? not applicable    Pt had fall when she slipped in the [place] and landed on her left shoulder. Diabetes Mellitus:  She has diabetes mellitus, and  Hyperlipidemia. she has not monitored her glucose control. Diabetic ROS - medication compliance: compliant most of the time, diabetic diet compliance: noncompliant some of the time. Lab review: labs reviewed, hgb a1c     Depression Review:  Patient is seen for followup of depression. Treatment includes none and no other therapies. Ongoing symptoms include depressed mood, anhedonia, insomnia, fatigue, feelings of worthlessness/guilt, difficulty concentrating and hopelessness. She denies hypersomnia, impaired memory and recurrent thoughts of death. She experiences the following side effects from the treatment: none. Anxiety Review:  Patient is seen for sleep disturbance. Current treatment includes doxepin and no other therapies. Ongoing symptoms include: insomnia. Patient denies: palpitations, sweating, chest pain, shortness of breath, dizziness, paresthesias, racing thoughts, psychomotor agitation, feelings of losing control, difficulty concentrating, suicidal ideation. Reported side effects from the treatment: none. ROS   Constitutional: Negative for activity change and appetite change. HENT: Negative for drooling and facial swelling. Eyes: Negative for pain and discharge. Respiratory: Negative for apnea and choking. Cardiovascular: Negative for palpitations and leg swelling.    Gastrointestinal: Positive for abdominal pain, diarrhea, nausea and vomiting. Negative for blood in stool and rectal pain. Endocrine: Negative for polydipsia and polyphagia. Genitourinary: Negative for genital sores and hematuria. Musculoskeletal: positive left shoulder pain  Skin: Negative for color change and rash. Allergic/Immunologic: Negative for environmental allergies. Neurological: Negative for tremors. Hematological: Negative for adenopathy. Psychiatric/Behavioral: Negative for agitation and behavioral problems     All other systems reviewed and are negative. Objective:  Vitals:    08/26/22 1518   BP: 111/88   Pulse: 94   Resp: 17   Temp: 97.2 °F (36.2 °C)   TempSrc: Temporal   SpO2: 96%   Weight: 148 lb 12.8 oz (67.5 kg)   Height: 5' 4\" (1.626 m)       alert, well appearing, and in no distress, oriented to person, place, and time, and normal appearing weight  Mental status - normal mood, behavior, speech, dress, motor activity, and thought processes  Chest - clear to auscultation, no wheezes, rales or rhonchi, symmetric air entry  Heart - normal rate, regular rhythm, normal S1, S2, no murmurs, rubs, clicks or gallops  Neurological - cranial nerves II through XII intact  Musculoskeletal - abnormal exam of left shoulder with pain with motion  Diabetic foot exam:     Left Foot:   Visual Exam: normal    Pulse DP: 2+ (normal)   Filament test: normal sensation       Right Foot:   Visual Exam: normal    Pulse DP: 2+ (normal)   Filament test: normal sensation     LABS     TESTS  Impression    Massive full thickness tear through the supraspinatus and infraspinatus tendons with 4 cm medial retraction. No significant muscle atrophy. Proximal biceps tendon moderate grade partial tearing. No complete rupture or medial dislocation. Glenoid grade IV chondromalacia. Assessment/Plan:    1. Traumatic tear of supraspinatus tendon of left shoulder, initial encounter  Scheduled for repair; cleared for surgery    2.  Type 2 diabetes mellitus with hyperglycemia, without long-term current use of insulin (HCC)  Goal hgb a1c <7  Foot exam normal    3. Essential hypertension, benign  Goal <130/80    4. Pre-op exam  Cleared for surgery      Lab review: labs reviewed, I note that glycosylated hemoglobin abnormal, lipids LDL result does not yet meet goal      I have discussed the diagnosis with the patient and the intended plan as seen in the above orders. The patient has received an after-visit summary and questions were answered concerning future plans. I have discussed medication side effects and warnings with the patient as well. I have reviewed the plan of care with the patient, accepted their input and they are in agreement with the treatment goals.

## 2022-08-26 NOTE — PROGRESS NOTES
Joselin Ren is a 58 y.o. female (: 1960) presenting to address:    Chief Complaint   Patient presents with    Pre-op Exam       There were no vitals filed for this visit. Hearing/Vision:   No results found. Learning Assessment:     Learning Assessment 2021   PRIMARY LEARNER Patient   HIGHEST LEVEL OF EDUCATION - PRIMARY LEARNER  -   BARRIERS PRIMARY LEARNER -   CO-LEARNER CAREGIVER -   PRIMARY LANGUAGE ENGLISH   LEARNER PREFERENCE PRIMARY DEMONSTRATION   ANSWERED BY patient   RELATIONSHIP SELF     Depression Screening:     3 most recent PHQ Screens 2022   Little interest or pleasure in doing things Not at all   Feeling down, depressed, irritable, or hopeless Not at all   Total Score PHQ 2 0     Fall Risk Assessment:     Fall Risk Assessment, last 12 mths 10/14/2020   Able to walk? Yes   Fall in past 12 months? No   Number of falls in past 12 months -   Fall with injury? -     Abuse Screening:     Abuse Screening Questionnaire 2022   Do you ever feel afraid of your partner? N   Are you in a relationship with someone who physically or mentally threatens you? N   Is it safe for you to go home? Y     ADL Assessment:     ADL Assessment 2019   Feeding yourself No Help Needed   Getting from bed to chair No Help Needed   Getting dressed No Help Needed   Bathing or showering No Help Needed   Walk across the room (includes cane/walker) No Help Needed   Using the telphone No Help Needed   Taking your medications No Help Needed   Preparing meals No Help Needed   Managing money (expenses/bills) No Help Needed   Moderately strenuous housework (laundry) No Help Needed   Shopping for personal items (toiletries/medicines) No Help Needed   Shopping for groceries No Help Needed   Driving Help Needed   Climbing a flight of stairs Help Needed   Getting to places beyond walking distances Help Needed        Coordination of Care Questionaire:     1.  \"Have you been to the ER, urgent care clinic since your last visit? Hospitalized since your last visit? \" No    2. \"Have you seen or consulted any other health care providers outside of the [address] [place] [address] since your last visit? \" No     3. For patients aged 39-70: Has the patient had a colonoscopy / FIT/ Cologuard? Yes - no Care Gap present      If the patient is female:    4. For patients aged 41-77: Has the patient had a mammogram within the past 2 years? Yes - no Care Gap present      5. For patients aged 21-65: Has the patient had a pap smear?  NA - based on age or sex

## 2022-08-29 ENCOUNTER — HOSPITAL ENCOUNTER (OUTPATIENT)
Dept: LAB | Age: 62
Discharge: HOME | End: 2022-08-29
Payer: COMMERCIAL

## 2022-08-29 ENCOUNTER — TRANSCRIBE ORDER (OUTPATIENT)
Dept: LAB | Age: 62
End: 2022-08-29

## 2022-08-29 DIAGNOSIS — Z01.818 PRE-OP EXAM: Primary | ICD-10-CM

## 2022-08-29 DIAGNOSIS — Z01.818 PRE-OP EXAM: ICD-10-CM

## 2022-08-29 LAB
ATRIAL RATE: 81 BPM
CALCULATED P AXIS, ECG09: 65 DEGREES
CALCULATED R AXIS, ECG10: 58 DEGREES
CALCULATED T AXIS, ECG11: 99 DEGREES
DIAGNOSIS, 93000: NORMAL
P-R INTERVAL, ECG05: 134 MS
Q-T INTERVAL, ECG07: 370 MS
QRS DURATION, ECG06: 70 MS
QTC CALCULATION (BEZET), ECG08: 429 MS
VENTRICULAR RATE, ECG03: 81 BPM

## 2022-08-29 PROCEDURE — 93005 ELECTROCARDIOGRAM TRACING: CPT

## 2022-08-30 ENCOUNTER — TELEPHONE (OUTPATIENT)
Dept: FAMILY MEDICINE CLINIC | Age: 62
End: 2022-08-30

## 2022-08-30 LAB
BASOPHILS # BLD AUTO: 0 X10E3/UL (ref 0–0.2)
BASOPHILS NFR BLD AUTO: 1 %
BUN SERPL-MCNC: 18 MG/DL (ref 8–27)
BUN/CREAT SERPL: 19 (ref 12–28)
CALCIUM SERPL-MCNC: 10.3 MG/DL (ref 8.7–10.3)
CHLORIDE SERPL-SCNC: 99 MMOL/L (ref 96–106)
CO2 SERPL-SCNC: 25 MMOL/L (ref 20–29)
CREAT SERPL-MCNC: 0.97 MG/DL (ref 0.57–1)
EGFR: 66 ML/MIN/1.73
EOSINOPHIL # BLD AUTO: 0.2 X10E3/UL (ref 0–0.4)
EOSINOPHIL NFR BLD AUTO: 3 %
ERYTHROCYTE [DISTWIDTH] IN BLOOD BY AUTOMATED COUNT: 13 % (ref 11.7–15.4)
GLUCOSE SERPL-MCNC: 166 MG/DL (ref 65–99)
HCT VFR BLD AUTO: 44.6 % (ref 34–46.6)
HGB BLD-MCNC: 14.6 G/DL (ref 11.1–15.9)
IMM GRANULOCYTES # BLD AUTO: 0 X10E3/UL (ref 0–0.1)
IMM GRANULOCYTES NFR BLD AUTO: 0 %
LYMPHOCYTES # BLD AUTO: 1.8 X10E3/UL (ref 0.7–3.1)
LYMPHOCYTES NFR BLD AUTO: 32 %
MCH RBC QN AUTO: 28.3 PG (ref 26.6–33)
MCHC RBC AUTO-ENTMCNC: 32.7 G/DL (ref 31.5–35.7)
MCV RBC AUTO: 86 FL (ref 79–97)
MONOCYTES # BLD AUTO: 0.5 X10E3/UL (ref 0.1–0.9)
MONOCYTES NFR BLD AUTO: 8 %
NEUTROPHILS # BLD AUTO: 3.2 X10E3/UL (ref 1.4–7)
NEUTROPHILS NFR BLD AUTO: 56 %
PLATELET # BLD AUTO: 266 X10E3/UL (ref 150–450)
POTASSIUM SERPL-SCNC: 4.7 MMOL/L (ref 3.5–5.2)
RBC # BLD AUTO: 5.16 X10E6/UL (ref 3.77–5.28)
SODIUM SERPL-SCNC: 138 MMOL/L (ref 134–144)
WBC # BLD AUTO: 5.7 X10E3/UL (ref 3.4–10.8)

## 2022-08-30 NOTE — TELEPHONE ENCOUNTER
Harini Louise as per our conversation today, patient is awaiting fax to be sent to Orthopaedic Surgeon / Willean [facility] office. Surgery is scheduled for 9/21/2022 for left arm rotary. Patient stated Labs and EKG were completed.

## 2022-10-06 ENCOUNTER — TELEPHONE (OUTPATIENT)
Dept: FAMILY MEDICINE CLINIC | Age: 62
End: 2022-10-06

## 2022-10-06 NOTE — TELEPHONE ENCOUNTER
----- Message from [facility] sent at 10/6/2022  1:24 PM EDT -----  Subject: Message to Provider    QUESTIONS  Information for Provider? Pt is calling saying that she dropped off FMLA   paperwork for PCP to fill out. Please call pt back as soon as it is   completed. ---------------------------------------------------------------------------  --------------  Micaela LINDA  [phone]; OK to leave message on voicemail  ---------------------------------------------------------------------------  --------------  SCRIPT ANSWERS  Relationship to Patient?  Self

## 2022-11-13 DIAGNOSIS — T38.0X5S STEROID-INDUCED DIABETES MELLITUS, SEQUELA (HCC): ICD-10-CM

## 2022-11-13 DIAGNOSIS — E09.9 STEROID-INDUCED DIABETES MELLITUS, SEQUELA (HCC): ICD-10-CM

## 2022-11-13 DIAGNOSIS — E11.65 TYPE 2 DIABETES MELLITUS WITH HYPERGLYCEMIA, WITHOUT LONG-TERM CURRENT USE OF INSULIN (HCC): ICD-10-CM

## 2022-11-15 RX ORDER — DULAGLUTIDE 1.5 MG/.5ML
INJECTION, SOLUTION SUBCUTANEOUS
Qty: 4 EACH | Refills: 5 | Status: SHIPPED | OUTPATIENT
Start: 2022-11-15

## 2022-11-26 DIAGNOSIS — G89.29 INSOMNIA SECONDARY TO CHRONIC PAIN: ICD-10-CM

## 2022-11-26 DIAGNOSIS — G47.01 INSOMNIA SECONDARY TO CHRONIC PAIN: ICD-10-CM

## 2022-11-30 RX ORDER — DOXEPIN HYDROCHLORIDE 50 MG/1
CAPSULE ORAL
Qty: 90 CAPSULE | Refills: 3 | Status: SHIPPED | OUTPATIENT
Start: 2022-11-30

## 2023-01-02 DIAGNOSIS — I10 ESSENTIAL HYPERTENSION: ICD-10-CM

## 2023-01-03 RX ORDER — BISOPROLOL FUMARATE AND HYDROCHLOROTHIAZIDE 2.5; 6.25 MG/1; MG/1
TABLET ORAL
Qty: 90 TABLET | Refills: 3 | Status: SHIPPED | OUTPATIENT
Start: 2023-01-03

## 2023-01-19 DIAGNOSIS — I10 ESSENTIAL HYPERTENSION, BENIGN: ICD-10-CM

## 2023-01-20 RX ORDER — AMLODIPINE BESYLATE 5 MG/1
TABLET ORAL
Qty: 90 TABLET | Refills: 3 | Status: SHIPPED | OUTPATIENT
Start: 2023-01-20

## 2023-02-21 ENCOUNTER — OFFICE VISIT (OUTPATIENT)
Facility: CLINIC | Age: 63
End: 2023-02-21

## 2023-02-21 VITALS
TEMPERATURE: 97.3 F | HEIGHT: 64 IN | HEART RATE: 82 BPM | DIASTOLIC BLOOD PRESSURE: 84 MMHG | WEIGHT: 137.6 LBS | OXYGEN SATURATION: 96 % | SYSTOLIC BLOOD PRESSURE: 113 MMHG | RESPIRATION RATE: 16 BRPM | BODY MASS INDEX: 23.49 KG/M2

## 2023-02-21 DIAGNOSIS — Z09 HOSPITAL DISCHARGE FOLLOW-UP: ICD-10-CM

## 2023-02-21 DIAGNOSIS — G89.29 CHRONIC BILATERAL LOW BACK PAIN WITHOUT SCIATICA: ICD-10-CM

## 2023-02-21 DIAGNOSIS — I10 ESSENTIAL HYPERTENSION, BENIGN: ICD-10-CM

## 2023-02-21 DIAGNOSIS — E11.65 TYPE 2 DIABETES MELLITUS WITH HYPERGLYCEMIA, WITHOUT LONG-TERM CURRENT USE OF INSULIN (HCC): ICD-10-CM

## 2023-02-21 DIAGNOSIS — F33.41 RECURRENT MAJOR DEPRESSIVE DISORDER, IN PARTIAL REMISSION (HCC): Primary | ICD-10-CM

## 2023-02-21 DIAGNOSIS — Z86.73 HISTORY OF RIGHT MCA STROKE: ICD-10-CM

## 2023-02-21 DIAGNOSIS — M54.50 CHRONIC BILATERAL LOW BACK PAIN WITHOUT SCIATICA: ICD-10-CM

## 2023-02-21 RX ORDER — CLOPIDOGREL BISULFATE 75 MG/1
75 TABLET ORAL DAILY
COMMUNITY
Start: 2023-02-20 | End: 2023-02-21 | Stop reason: SDUPTHER

## 2023-02-21 RX ORDER — HYDROCODONE BITARTRATE AND ACETAMINOPHEN 5; 325 MG/1; MG/1
1 TABLET ORAL EVERY 4 HOURS PRN
Qty: 18 TABLET | Refills: 0 | Status: SHIPPED | OUTPATIENT
Start: 2023-02-21 | End: 2023-02-24

## 2023-02-21 RX ORDER — CLOPIDOGREL BISULFATE 75 MG/1
75 TABLET ORAL DAILY
Qty: 30 TABLET | Refills: 3 | Status: SHIPPED | OUTPATIENT
Start: 2023-02-21

## 2023-02-21 RX ORDER — VENLAFAXINE HYDROCHLORIDE 37.5 MG/1
37.5 CAPSULE, EXTENDED RELEASE ORAL DAILY
Qty: 30 CAPSULE | Refills: 3 | Status: SHIPPED | OUTPATIENT
Start: 2023-02-21

## 2023-02-21 RX ORDER — AMLODIPINE BESYLATE 10 MG/1
10 TABLET ORAL DAILY
Qty: 90 TABLET | Refills: 3
Start: 2023-02-21

## 2023-02-21 SDOH — ECONOMIC STABILITY: INCOME INSECURITY: HOW HARD IS IT FOR YOU TO PAY FOR THE VERY BASICS LIKE FOOD, HOUSING, MEDICAL CARE, AND HEATING?: NOT HARD AT ALL

## 2023-02-21 SDOH — ECONOMIC STABILITY: FOOD INSECURITY: WITHIN THE PAST 12 MONTHS, YOU WORRIED THAT YOUR FOOD WOULD RUN OUT BEFORE YOU GOT MONEY TO BUY MORE.: NEVER TRUE

## 2023-02-21 SDOH — ECONOMIC STABILITY: HOUSING INSECURITY
IN THE LAST 12 MONTHS, WAS THERE A TIME WHEN YOU DID NOT HAVE A STEADY PLACE TO SLEEP OR SLEPT IN A SHELTER (INCLUDING NOW)?: NO

## 2023-02-21 SDOH — ECONOMIC STABILITY: FOOD INSECURITY: WITHIN THE PAST 12 MONTHS, THE FOOD YOU BOUGHT JUST DIDN'T LAST AND YOU DIDN'T HAVE MONEY TO GET MORE.: NEVER TRUE

## 2023-02-21 ASSESSMENT — PATIENT HEALTH QUESTIONNAIRE - PHQ9
1. LITTLE INTEREST OR PLEASURE IN DOING THINGS: 0
5. POOR APPETITE OR OVEREATING: 0
3. TROUBLE FALLING OR STAYING ASLEEP: 0
8. MOVING OR SPEAKING SO SLOWLY THAT OTHER PEOPLE COULD HAVE NOTICED. OR THE OPPOSITE, BEING SO FIGETY OR RESTLESS THAT YOU HAVE BEEN MOVING AROUND A LOT MORE THAN USUAL: 0
7. TROUBLE CONCENTRATING ON THINGS, SUCH AS READING THE NEWSPAPER OR WATCHING TELEVISION: 0
6. FEELING BAD ABOUT YOURSELF - OR THAT YOU ARE A FAILURE OR HAVE LET YOURSELF OR YOUR FAMILY DOWN: 0
4. FEELING TIRED OR HAVING LITTLE ENERGY: 0
SUM OF ALL RESPONSES TO PHQ QUESTIONS 1-9: 0
SUM OF ALL RESPONSES TO PHQ9 QUESTIONS 1 & 2: 0
2. FEELING DOWN, DEPRESSED OR HOPELESS: 0
9. THOUGHTS THAT YOU WOULD BE BETTER OFF DEAD, OR OF HURTING YOURSELF: 0
10. IF YOU CHECKED OFF ANY PROBLEMS, HOW DIFFICULT HAVE THESE PROBLEMS MADE IT FOR YOU TO DO YOUR WORK, TAKE CARE OF THINGS AT HOME, OR GET ALONG WITH OTHER PEOPLE: 0
SUM OF ALL RESPONSES TO PHQ QUESTIONS 1-9: 0

## 2023-02-21 ASSESSMENT — LIFESTYLE VARIABLES
HOW OFTEN DO YOU HAVE A DRINK CONTAINING ALCOHOL: NEVER
HOW MANY STANDARD DRINKS CONTAINING ALCOHOL DO YOU HAVE ON A TYPICAL DAY: PATIENT DOES NOT DRINK

## 2023-02-21 ASSESSMENT — ANXIETY QUESTIONNAIRES
1. FEELING NERVOUS, ANXIOUS, OR ON EDGE: 0
2. NOT BEING ABLE TO STOP OR CONTROL WORRYING: 0
6. BECOMING EASILY ANNOYED OR IRRITABLE: 0
3. WORRYING TOO MUCH ABOUT DIFFERENT THINGS: 0
GAD7 TOTAL SCORE: 0
5. BEING SO RESTLESS THAT IT IS HARD TO SIT STILL: 0
IF YOU CHECKED OFF ANY PROBLEMS ON THIS QUESTIONNAIRE, HOW DIFFICULT HAVE THESE PROBLEMS MADE IT FOR YOU TO DO YOUR WORK, TAKE CARE OF THINGS AT HOME, OR GET ALONG WITH OTHER PEOPLE: NOT DIFFICULT AT ALL
4. TROUBLE RELAXING: 0
7. FEELING AFRAID AS IF SOMETHING AWFUL MIGHT HAPPEN: 0

## 2023-02-21 NOTE — PROGRESS NOTES
Jolene Hernandez is a 61 y.o. presents today for   Chief Complaint   Patient presents with    Follow-Up from Hospital    Medication Refill         Depression Screening:   PHQ-9 Questionaire 2/21/2023 8/26/2022 1/24/2022   Little interest or pleasure in doing things 0 0 0   Feeling down, depressed, or hopeless 0 0 0   Trouble falling or staying asleep, or sleeping too much 0 - -   Feeling tired or having little energy 0 - -   Poor appetite or overeating 0 - -   Feeling bad about yourself - or that you are a failure or have let yourself or your family down 0 - -   Trouble concentrating on things, such as reading the newspaper or watching television 0 - -   Moving or speaking so slowly that other people could have noticed. Or the opposite - being so fidgety or restless that you have been moving around a lot more than usual 0 - -   Thoughts that you would be better off dead, or of hurting yourself in some way 0 - -   PHQ-9 Total Score 0 0 0   If you checked off any problems, how difficult have these problems made it for you to do your work, take care of things at home, or get along with other people? 0 - -       Abuse Screening: AMB Abuse Screening 2/21/2023   Do you ever feel afraid of your partner? N   Are you in a relationship with someone who physically or mentally threatens you? N   Is it safe for you to go home? Y       Learning Assessment:  No question data found. Fall Risk:  Fall Risk 2/21/2023   2 or more falls in past year? no   Fall with injury in past year? no           Coordination of Care:   1. \"Have you been to the ER, urgent care clinic since your last visit? Hospitalized since your last visit? \" Yes [facility]    2. \"Have you seen or consulted any other health care providers outside of the [address] [place] [address] since your last visit? \" no    3. For patients aged 39-70: Has the patient had a colonoscopy / FIT/ Cologuard? yes    If the patient is female:    4. For patients aged 41-77:  Has the patient had a mammogram within the past 2 years? yes    5. For patients aged 21-65: Has the patient had a pap smear? no    Health Maintenance: reviewed and discussed and ordered per Provider.     Health Maintenance Due   Topic Date Due    HIV screen  Never done    DTaP/Tdap/Td vaccine (1 - Tdap) Never done    Shingles vaccine (1 of 2) Never done    Diabetic retinal exam  05/29/2018    Diabetic Alb to Cr ratio (uACR) test  11/24/2022

## 2023-02-21 NOTE — LETTER
[facility]  [address] [place] [address], [address] [address]  Phone: [phone]  Fax: [phone]    Gisela Batista MD        February 21, 2023     Patient: Sherri Cohen   YOB: 1960   Date of Visit: 2/21/2023       To Whom It May Concern: It is my medical opinion that Zane Olguin may resume physical therapy s/p left rotator cuff surgery. If you have any questions or concerns, please don't hesitate to call.     Sincerely,        Gisela Batista MD

## 2023-02-21 NOTE — PROGRESS NOTES
Moustapha Hook is a 61 y.o.  female and presents with    Chief Complaint   Patient presents with    Follow-Up from Hospital    Medication Refill     1/15/2023 admitted for slurred speech and suspected stroke  1/19/2023 discharged to home      Subjective:  Ms. Lionel Marte is following up after hospitalization; she is accompanied by her . Pt states on the day she presented to the ER she had dysarthria and difficulty word finding. Pt also endorsed HA and L facial droop. Pt denied changes in vision, focal weakness, paresthesia. In ED, pt had HCT neg. CTA head/neck showed stenosis of R ICA and PCA. New worsening HA  Repeat CTH: Multifocal left-sided frontoparietal white matter and cortical ischemic infarcts similar to recent MRI without evidence of acute hemorrhage. Dysarthria, L sided facial Droop  CVA  HCT: No acute intracranial abnorm  HMRI: Acute small multifocal nonhemorrhagic infarctions in the frontal and parietal lobe consistent with distribution MCA left side  CTA head/neck(PRELIM): Mod focal stenosis R ICA. Mod stenosis PCA. No sig carotid or vertebral stenosis     Diabetes Mellitus:  She has diabetes mellitus, and  Hyperlipidemia. she has not monitored her glucose control. Diabetic ROS - medication compliance: compliant most of the time, diabetic diet compliance: noncompliant some of the time. Lab review: labs reviewed, hgb a1c     Depression Review:  Patient is seen for followup of depression. Treatment includes none and no other therapies. Ongoing symptoms include depressed mood, anhedonia, insomnia, fatigue, feelings of worthlessness/guilt, difficulty concentrating and hopelessness. She denies hypersomnia, impaired memory and recurrent thoughts of death. She experiences the following side effects from the treatment: none. Anxiety Review:  Patient is seen for sleep disturbance. Current treatment includes doxepin and no other therapies.    Ongoing symptoms include: insomnia. Patient denies: palpitations, sweating, chest pain, shortness of breath, dizziness, paresthesias, racing thoughts, psychomotor agitation, feelings of losing control, difficulty concentrating, suicidal ideation. Reported side effects from the treatment: none. ROS   Constitutional: Negative for activity change and appetite change. HENT: Negative for drooling and facial swelling. Eyes: Negative for pain and discharge. Respiratory: Negative for apnea and choking. Cardiovascular: Negative for palpitations and leg swelling. Gastrointestinal: Positive for abdominal pain, diarrhea, nausea and vomiting. Negative for blood in stool and rectal pain. Endocrine: Negative for polydipsia and polyphagia. Genitourinary: Negative for genital sores and hematuria. Musculoskeletal: positive left shoulder pain  Skin: Negative for color change and rash. Allergic/Immunologic: Negative for environmental allergies. Neurological: Negative for tremors. Hematological: Negative for adenopathy. Psychiatric/Behavioral: Negative for agitation and behavioral problems  All other systems reviewed and are negative. Objective:  Vitals:    02/21/23 1229   BP: 113/84   Pulse: 82   Resp: 16   Temp: 97.3 °F (36.3 °C)   SpO2: 96%       alert, well appearing, and in no distress, oriented to person, place, and time, and normal appearing weight  Mental status - normal mood, behavior, speech, dress, motor activity, and thought processes  Chest - clear to auscultation, no wheezes, rales or rhonchi, symmetric air entry  Heart - normal rate, regular rhythm, normal S1, S2, no murmurs, rubs, clicks or gallops  Abdomen - ttp diffuse; no masses  Neurological - cranial nerves II through XII intact    LABS     TESTS  TTE: no defect  MRI head  Impression    Acute small multifocal nonhemorrhagic infarctions in the frontal and parietal lobe consistent with distribution MCA left side   Assessment/Plan:    1.  Recurrent major depressive disorder, in partial remission ([facility] [address]  Start treatment for mood and chronic pain  - venlafaxine (EFFEXOR XR) 37.5 MG extended release capsule; Take 1 capsule by mouth daily  Dispense: 30 capsule; Refill: 3    2. Type 2 diabetes mellitus with hyperglycemia, without long-term current use of insulin ([facility])  Goal hgb a1c <7; encourage healthy diet and compliance with medication    3. History of right MCA stroke  Home physical and occupational therapy ordered; anticoagulant prescribed ASA, plavix for 3 months, then asa alone  Statin  - [address] [place] [address]/[place]  - clopidogrel (PLAVIX) 75 MG tablet; Take 1 tablet by mouth daily  Dispense: 30 tablet; Refill: 3    4. Essential hypertension, benign  Goal <130/80; continue treatment   - amLODIPine (NORVASC) 10 MG tablet; Take 1 tablet by mouth daily  Dispense: 90 tablet; Refill: 3    5. Chronic bilateral low back pain without sciatica  This is a chronic problem that is stable. Per review of available records and patients , there are not sign of overuse, misuse, diversion, or concerning side effects. Today we reviewed: the risk of overdose, addiction, and dependency proper storage and disposal of medications the goals of treatment (improve functionality, quality of life, and pain) alternative treatment options including non-narcotic modalities the risks and benefits of continuing with a narcotic based pain regimen considering narcotic therapy discontinuation if benefits do not outweigh risks tapering to a lower dose  The following changes were made to the patients current treatment plan: medications adjusted, see orders.        - HYDROcodone-acetaminophen (NORCO) 5-325 MG per tablet; Take 1 tablet by mouth every 4 hours as needed for Pain for up to 3 days. Intended supply: 3 days. Take lowest dose possible to manage pain Max Daily Amount: 6 tablets  Dispense: 18 tablet; Refill: 0    6.  Hospital discharge follow-up    - MI DISCHARGE MEDS RECONCILED W/ CURRENT OUTPATIENT MED LIST       Lab review: labs reviewed, I note that glycosylated hemoglobin mildly abnormal but acceptable      I have discussed the diagnosis with the patient and the intended plan as seen in the above orders. The patient has received an after-visit summary and questions were answered concerning future plans. I have discussed medication side effects and warnings with the patient as well. I have reviewed the plan of care with the patient, accepted their input and they are in agreement with the treatment goals.

## 2023-02-22 ENCOUNTER — TELEPHONE (OUTPATIENT)
Facility: CLINIC | Age: 63
End: 2023-02-22

## 2023-02-23 ENCOUNTER — CLINICAL DOCUMENTATION (OUTPATIENT)
Facility: CLINIC | Age: 63
End: 2023-02-23

## 2023-02-23 RX ORDER — BLOOD-GLUCOSE METER
1 KIT MISCELLANEOUS 2 TIMES DAILY
Qty: 1 KIT | Refills: 0 | Status: SHIPPED | OUTPATIENT
Start: 2023-02-23

## 2023-02-23 RX ORDER — BLOOD-GLUCOSE METER
KIT MISCELLANEOUS
COMMUNITY
End: 2023-02-23 | Stop reason: SDUPTHER

## 2023-02-24 ENCOUNTER — HOME HEALTH ADMISSION (OUTPATIENT)
Age: 63
End: 2023-02-24

## 2023-02-26 ENCOUNTER — HOME CARE VISIT (OUTPATIENT)
Age: 63
End: 2023-02-26

## 2023-02-27 NOTE — CASE COMMUNICATION
SLP contacted Pt. Pt states she has an appointment at the time ST visit can be made for 2/27/23. Pt agreed for visit on 3/1.

## 2023-03-01 ENCOUNTER — HOME CARE VISIT (OUTPATIENT)
Age: 63
End: 2023-03-01

## 2023-03-02 NOTE — HOME HEALTH
RECENT HX OF CURRENT ILLNESS/REASON FOR REFERRAL:[facility]  Pt was seen by Dr. Ira Stark on office visit on     Subjective:    Ms. Chong Blank is following up after hospitalization; she is accompanied by her . Pt states on the day she presented to the ER she had dysarthria and difficulty word finding. Pt also endorsed HA and L facial droop. Pt denied changes in vision, focal weakness, paresthe gloria. In ED, pt had HCT neg. CTA head/neck showed stenosis of R ICA and PCA. New worsening HA  Repeat CTH: Multifocal left-sided frontoparietal white matter and cortical ischemic infarcts similar to recent MRI without evidence of acute hemorrhage. Dysarthria, L sided facial Droop  CVA  HCT: No acute intracranial abnorm  HMRI: Acute small multifocal nonhemorrhagic infarctions in the frontal and parietal lobe consistent with distri bution MCA left side  CTA head/neck(PRELIM): Mod focal stenosis R ICA. Mod stenosis PCA. No sig carotid or vertebral stenosis   ASA, plavix for 3 months, then asa alone  Statin    2/15/2023    [address] Date: 2/15/2023 D/C Date: 2/19/2023 Patient Class: Inpatient     Primary Discharge Diagnosis:   Active Hospital Problems   Dysarthria [R47.1]    CVA    Hospital Course:  59yo W w/ PMH sig for HTN, DM, HLP,  asthma, cocaine abuse presents to ER w/ 1d hx of dysarthria. Pt states @ 1600 had dysarthria and difficulty word finding. Pt also endorsed HA and L facial droop. Pt denied changes in vision, focal weakness, paresthesia. In ED, pt had HCT neg. CTA head/neck showed stenosis of R ICA and PCA. New worsening HA  Repeat CTH: Multifocal left-sided frontoparietal white matter and cortical ischemic infarcts similar to recent MRI without evid ence of acute hemorrhage.      Dysarthria, L sided facial Droop  CVA  HCT: No acute intracranial abnorm  HMRI: Acute small multifocal nonhemorrhagic infarctions in the frontal and parietal lobe consistent with distribution MCA left side  CTA head/neck(PRELIM): Mod focal stenosis R ICA. Mod stenosis PCA. No sig carotid or vertebral stenosis   ASA, plavix for 3 months, then asa alone  Statin  TTE:  CONCLUSIONS   * Saline bubble study attem pted x 3 without success. * Left ventricular chamber size is normal with mildly increased wall  thickness. * Left ventricular systolic function is normal with an ejection fraction of  60 % by 3D analysis. * Normal diastolic function for age. * Right ventricular chamber dimension is normal with normal systolic  function, TAPSE measures 1.81 cm. * Unable to estimate pulmonary arterial pressure due to inadequate tricuspid  regurg itant Doppler waveforms. * There is trivial anterior pericardial effusion. * No hemodynamically significant valvular disease. * No obvious mass, shunts, or thrombi. * For the indication of Stroke/TIA, findings are as stated above.   SLP rec reg diet thin liq  PT OT no therapy       PLOF/DIET/COMMUNICATION:    PAST MEDICAL HISTORY:    CURRENT RESIDENCE/LIVING SITUATION:     EDUCATIONAL LEVEL:    ___ < OR = 8TH GRADE        ___SOME  HIGH SCHOOL  ___ HIGH SCHOOL GRAD OR GED      ___POST SECONDARY EDUCATION    SUBJECTIVE (PT/FAMILY COMMENTS, THERAPIST OBSERVATIONS): appendix flare up, nausea long term    CAREGIVER INVOLVEMENT:     ASSESSMENT / Verefrain Riggers / PLAN:    MEDICATIONS RECONCILIATION COMPLETED     WAS CONTACTED AND INFORMED OF ST POC AND FREQ      PATIENT RESPONSE TO TREATMENT:      PATIENT LEVEL OF UNDERSTANDING OF EDUCATION PROVIDED:       MD NOTIFIED OF  POC AND FREQUENCY    PT GOALS:    HOME EXERCISE PROGRAM:     DISCHARGE PLANNING - (ANTICIPATED DC DATE, DC PLAN):

## 2023-03-03 NOTE — CASE COMMUNICATION
SLP arrived at scheduled visit with Pt. Pt reports she is receiving outpatient PT for her shoulder. SLP informed Pt cannot be opened to [facility] if receiving outpatient. SLP advised Pt transfer to outpatient for ST. Pt was not admitted to [facility].

## 2023-03-13 ENCOUNTER — OFFICE VISIT (OUTPATIENT)
Facility: CLINIC | Age: 63
End: 2023-03-13
Payer: COMMERCIAL

## 2023-03-13 VITALS
BODY MASS INDEX: 26.26 KG/M2 | HEIGHT: 64 IN | RESPIRATION RATE: 16 BRPM | SYSTOLIC BLOOD PRESSURE: 123 MMHG | DIASTOLIC BLOOD PRESSURE: 80 MMHG | OXYGEN SATURATION: 96 % | HEART RATE: 81 BPM | WEIGHT: 153.8 LBS | TEMPERATURE: 97.2 F

## 2023-03-13 DIAGNOSIS — E11.65 TYPE 2 DIABETES MELLITUS WITH HYPERGLYCEMIA, WITHOUT LONG-TERM CURRENT USE OF INSULIN (HCC): ICD-10-CM

## 2023-03-13 DIAGNOSIS — G44.209 TENSION HEADACHE: ICD-10-CM

## 2023-03-13 DIAGNOSIS — N93.9 ABNORMAL VAGINAL BLEEDING: Primary | ICD-10-CM

## 2023-03-13 DIAGNOSIS — M05.751 RHEUMATOID ARTHRITIS INVOLVING RIGHT HIP WITH POSITIVE RHEUMATOID FACTOR (HCC): ICD-10-CM

## 2023-03-13 PROCEDURE — 96372 THER/PROPH/DIAG INJ SC/IM: CPT | Performed by: FAMILY MEDICINE

## 2023-03-13 PROCEDURE — 99215 OFFICE O/P EST HI 40 MIN: CPT | Performed by: FAMILY MEDICINE

## 2023-03-13 PROCEDURE — 3074F SYST BP LT 130 MM HG: CPT | Performed by: FAMILY MEDICINE

## 2023-03-13 PROCEDURE — 3078F DIAST BP <80 MM HG: CPT | Performed by: FAMILY MEDICINE

## 2023-03-13 RX ORDER — KETOROLAC TROMETHAMINE 30 MG/ML
30 INJECTION, SOLUTION INTRAMUSCULAR; INTRAVENOUS EVERY 6 HOURS PRN
Status: SHIPPED | OUTPATIENT
Start: 2023-03-13 | End: 2023-03-18

## 2023-03-13 RX ORDER — HYDROXYCHLOROQUINE SULFATE 200 MG/1
200 TABLET, FILM COATED ORAL DAILY
Qty: 30 TABLET | Refills: 2 | Status: SHIPPED | OUTPATIENT
Start: 2023-03-13

## 2023-03-13 RX ADMIN — KETOROLAC TROMETHAMINE 30 MG: 30 INJECTION, SOLUTION INTRAMUSCULAR; INTRAVENOUS at 17:03

## 2023-03-13 SDOH — ECONOMIC STABILITY: FOOD INSECURITY: WITHIN THE PAST 12 MONTHS, THE FOOD YOU BOUGHT JUST DIDN'T LAST AND YOU DIDN'T HAVE MONEY TO GET MORE.: NEVER TRUE

## 2023-03-13 SDOH — ECONOMIC STABILITY: FOOD INSECURITY: WITHIN THE PAST 12 MONTHS, YOU WORRIED THAT YOUR FOOD WOULD RUN OUT BEFORE YOU GOT MONEY TO BUY MORE.: NEVER TRUE

## 2023-03-13 SDOH — ECONOMIC STABILITY: INCOME INSECURITY: HOW HARD IS IT FOR YOU TO PAY FOR THE VERY BASICS LIKE FOOD, HOUSING, MEDICAL CARE, AND HEATING?: NOT HARD AT ALL

## 2023-03-13 ASSESSMENT — ANXIETY QUESTIONNAIRES
2. NOT BEING ABLE TO STOP OR CONTROL WORRYING: 0
7. FEELING AFRAID AS IF SOMETHING AWFUL MIGHT HAPPEN: 0
IF YOU CHECKED OFF ANY PROBLEMS ON THIS QUESTIONNAIRE, HOW DIFFICULT HAVE THESE PROBLEMS MADE IT FOR YOU TO DO YOUR WORK, TAKE CARE OF THINGS AT HOME, OR GET ALONG WITH OTHER PEOPLE: NOT DIFFICULT AT ALL
5. BEING SO RESTLESS THAT IT IS HARD TO SIT STILL: 0
4. TROUBLE RELAXING: 0
GAD7 TOTAL SCORE: 0
1. FEELING NERVOUS, ANXIOUS, OR ON EDGE: 0
6. BECOMING EASILY ANNOYED OR IRRITABLE: 0
3. WORRYING TOO MUCH ABOUT DIFFERENT THINGS: 0

## 2023-03-13 ASSESSMENT — PATIENT HEALTH QUESTIONNAIRE - PHQ9
1. LITTLE INTEREST OR PLEASURE IN DOING THINGS: 0
10. IF YOU CHECKED OFF ANY PROBLEMS, HOW DIFFICULT HAVE THESE PROBLEMS MADE IT FOR YOU TO DO YOUR WORK, TAKE CARE OF THINGS AT HOME, OR GET ALONG WITH OTHER PEOPLE: 0
7. TROUBLE CONCENTRATING ON THINGS, SUCH AS READING THE NEWSPAPER OR WATCHING TELEVISION: 0
5. POOR APPETITE OR OVEREATING: 0
SUM OF ALL RESPONSES TO PHQ9 QUESTIONS 1 & 2: 0
3. TROUBLE FALLING OR STAYING ASLEEP: 0
SUM OF ALL RESPONSES TO PHQ QUESTIONS 1-9: 0
2. FEELING DOWN, DEPRESSED OR HOPELESS: 0
SUM OF ALL RESPONSES TO PHQ QUESTIONS 1-9: 0
8. MOVING OR SPEAKING SO SLOWLY THAT OTHER PEOPLE COULD HAVE NOTICED. OR THE OPPOSITE, BEING SO FIGETY OR RESTLESS THAT YOU HAVE BEEN MOVING AROUND A LOT MORE THAN USUAL: 0
SUM OF ALL RESPONSES TO PHQ QUESTIONS 1-9: 0
6. FEELING BAD ABOUT YOURSELF - OR THAT YOU ARE A FAILURE OR HAVE LET YOURSELF OR YOUR FAMILY DOWN: 0
SUM OF ALL RESPONSES TO PHQ QUESTIONS 1-9: 0
4. FEELING TIRED OR HAVING LITTLE ENERGY: 0
9. THOUGHTS THAT YOU WOULD BE BETTER OFF DEAD, OR OF HURTING YOURSELF: 0

## 2023-03-13 ASSESSMENT — ENCOUNTER SYMPTOMS
SINUS PAIN: 0
SORE THROAT: 0
EYE PAIN: 0
BLOOD IN STOOL: 0

## 2023-03-13 NOTE — PROGRESS NOTES
vaginal pain. Objective:   /80 (Site: Right Upper Arm, Position: Sitting, Cuff Size: Medium Adult)   Pulse 81   Temp 97.2 °F (36.2 °C) (Temporal)   Resp 16   Ht 5' 4\" (1.626 m)   Wt 153 lb 12.8 oz (69.8 kg)   SpO2 96%   BMI 26.40 kg/m²   alert, well appearing, and in no distress, oriented to person, place, and time and normal appearing weight  Mental status - normal mood, behavior, speech, dress, motor activity, and thought processes  Chest - clear to auscultation, no wheezes, rales or rhonchi, symmetric air entry  Heart - normal rate, regular rhythm, normal S1, S2, no murmurs, rubs, clicks or gallops  Abdomen - diffuse ttp  Extremities - no pedal edema noted  Skin - normal coloration and turgor, no rashes, no suspicious skin lesions noted       Assessment/Plan:      1. Abnormal vaginal bleeding  Refer for furhter evaluation and treatment; cbc ordered  - External Referral To Gynecology    2. Type 2 diabetes mellitus with hyperglycemia, without long-term current use of insulin ([facility])  Goal hgb a21c <7; assess for efficacy of treatment    3. Rheumatoid arthritis involving right hip with positive rheumatoid factor ([facility] [address]  Refer; start blockage  - External Referral To Rheumatology  - hydroxychloroquine (PLAQUENIL) 200 MG tablet; Take 1 tablet by mouth daily  Dispense: 30 tablet; Refill: 2    4.  Tension headache  Immediate results  - ketorolac (TORADOL) injection 30 mg

## 2023-03-13 NOTE — PROGRESS NOTES
mammogram within the past 2 years? yes    5. For patients aged 21-65: Has the patient had a pap smear? no    Health Maintenance: reviewed and discussed and ordered per Provider.     Health Maintenance Due   Topic Date Due    HIV screen  Never done    DTaP/Tdap/Td vaccine (1 - Tdap) Never done    Shingles vaccine (1 of 2) Never done    Diabetic retinal exam  05/29/2018    Diabetic Alb to Cr ratio (uACR) test  11/24/2022

## 2023-03-20 ENCOUNTER — HOSPITAL ENCOUNTER (OUTPATIENT)
Dept: WOMENS IMAGING | Facility: HOSPITAL | Age: 63
Discharge: HOME | End: 2023-03-23
Payer: COMMERCIAL

## 2023-03-20 DIAGNOSIS — Z12.31 VISIT FOR SCREENING MAMMOGRAM: ICD-10-CM

## 2023-03-20 PROCEDURE — 77063 BREAST TOMOSYNTHESIS BI: CPT

## 2023-04-10 ENCOUNTER — TELEPHONE (OUTPATIENT)
Facility: CLINIC | Age: 63
End: 2023-04-10

## 2023-04-18 NOTE — ED NOTES
Assumed care of patient for discharge. I have reviewed discharge instructions with the patient. The patient verbalized understanding. Patient armband removed and shredded. VSS. Message received from Michelle:     \"Chalo is tolerating the steroid decrease. There has been a little drooping of the mouth and a few days his speech was slower, that is better now.  A new symptom we haven't seen is swelling of the face.  His face seems very puffy.  I noticed this late last week and does not appear to be getting better.  It doesn't bother him but figured I would let you know. \"        Writer let Michelle know it is likely from taking steroids, but told her I will forward on to Dr. Rodriguez as an FYI.

## 2023-05-02 ENCOUNTER — OFFICE VISIT (OUTPATIENT)
Facility: CLINIC | Age: 63
End: 2023-05-02
Payer: COMMERCIAL

## 2023-05-02 VITALS
WEIGHT: 156 LBS | DIASTOLIC BLOOD PRESSURE: 66 MMHG | SYSTOLIC BLOOD PRESSURE: 115 MMHG | HEART RATE: 65 BPM | BODY MASS INDEX: 26.63 KG/M2 | TEMPERATURE: 97.1 F | RESPIRATION RATE: 16 BRPM | HEIGHT: 64 IN

## 2023-05-02 DIAGNOSIS — J45.40 MODERATE PERSISTENT ASTHMA WITHOUT COMPLICATION: ICD-10-CM

## 2023-05-02 DIAGNOSIS — J30.1 SEASONAL ALLERGIC RHINITIS DUE TO POLLEN: Primary | ICD-10-CM

## 2023-05-02 DIAGNOSIS — M05.751 RHEUMATOID ARTHRITIS INVOLVING RIGHT HIP WITH POSITIVE RHEUMATOID FACTOR (HCC): ICD-10-CM

## 2023-05-02 DIAGNOSIS — G43.009 MIGRAINE WITHOUT AURA AND WITHOUT STATUS MIGRAINOSUS, NOT INTRACTABLE: ICD-10-CM

## 2023-05-02 DIAGNOSIS — E11.65 TYPE 2 DIABETES MELLITUS WITH HYPERGLYCEMIA, WITHOUT LONG-TERM CURRENT USE OF INSULIN (HCC): ICD-10-CM

## 2023-05-02 PROCEDURE — 3078F DIAST BP <80 MM HG: CPT | Performed by: FAMILY MEDICINE

## 2023-05-02 PROCEDURE — 3074F SYST BP LT 130 MM HG: CPT | Performed by: FAMILY MEDICINE

## 2023-05-02 PROCEDURE — 99214 OFFICE O/P EST MOD 30 MIN: CPT | Performed by: FAMILY MEDICINE

## 2023-05-02 RX ORDER — ALBUTEROL SULFATE 2.5 MG/3ML
2.5 SOLUTION RESPIRATORY (INHALATION) EVERY 6 HOURS PRN
Qty: 120 EACH | Refills: 5 | Status: SHIPPED | OUTPATIENT
Start: 2023-05-02

## 2023-05-02 RX ORDER — RIMEGEPANT SULFATE 75 MG/75MG
75 TABLET, ORALLY DISINTEGRATING ORAL DAILY PRN
Qty: 8 TABLET | Refills: 1 | Status: SHIPPED | OUTPATIENT
Start: 2023-05-02

## 2023-05-02 RX ORDER — FLUTICASONE PROPIONATE 50 MCG
1 SPRAY, SUSPENSION (ML) NASAL DAILY
Qty: 32 G | Refills: 1 | Status: SHIPPED | OUTPATIENT
Start: 2023-05-02

## 2023-05-02 RX ORDER — FREMANEZUMAB-VFRM 225 MG/1.5ML
225 INJECTION SUBCUTANEOUS
Qty: 1 ADJUSTABLE DOSE PRE-FILLED PEN SYRINGE | Refills: 5 | Status: SHIPPED | OUTPATIENT
Start: 2023-05-02

## 2023-05-02 NOTE — PROGRESS NOTES
Jun Prescott is a 61 y.o.  female and presents with    Chief Complaint   Patient presents with    Migraine    Medication Refill       Subjective:  Ms. Flavio Fitzgerald returns for chronic pain; she has migraine and is taking doxepine for prophylaxis and used nurtec with some relief. She gets a migraine daily. She is requesting refill for nebulizer medications    She is experiencing allergy symptoms. She is not using nasal steroid. Review of Systems   Constitutional:  Negative for appetite change and unexpected weight change. HENT:  Negative for sinus pain, sneezing and sore throat. Eyes:  Negative for pain. Gastrointestinal:  Negative for blood in stool. Endocrine: Negative for polyuria. Genitourinary:  resolved vaginal bleeding. Negative for dysuria and vaginal pain. All other systems reviewed and are negative. Objective:  Vitals:    05/02/23 1120   BP: 115/66   Pulse: 65   Resp: 16   Temp: 97.1 °F (36.2 °C)     alert, well appearing, and in no distress, oriented to person, place, and time and normal appearing weight  Mental status - normal mood, behavior, speech, dress, motor activity, and thought processes  Chest - clear to auscultation, no wheezes, rales or rhonchi, symmetric air entry  Heart - normal rate, regular rhythm, normal S1, S2, no murmurs, rubs, clicks or gallops  Abdomen - diffuse ttp  Extremities - no pedal edema noted  Skin - normal coloration and turgor, no rashes, no suspicious skin lesions noted    LABS     TESTS      Assessment/Plan:    1. Seasonal allergic rhinitis due to pollen  Start nasal steroid for symptoms  - fluticasone (FLONASE) 50 MCG/ACT nasal spray; 1 spray by Each Nostril route daily  Dispense: 32 g; Refill: 1    2. Type 2 diabetes mellitus with hyperglycemia, without long-term current use of insulin ([facility])  Goal hgb a1c <7; encourage low carb diet and compliance with medications    3.  Rheumatoid arthritis involving right hip with positive
Assessment:  No question data found. Fall Risk:  Fall Risk 5/2/2023 3/13/2023 2/21/2023   2 or more falls in past year? no no no   Fall with injury in past year? no no no           Coordination of Care:   1. \"Have you been to the ER, urgent care clinic since your last visit? Hospitalized since your last visit? \" no    2. \"Have you seen or consulted any other health care providers outside of the [address] [place] [address] since your last visit? \" no    3. For patients aged 39-70: Has the patient had a colonoscopy / FIT/ Cologuard? yes    If the patient is female:    4. For patients aged 41-77: Has the patient had a mammogram within the past 2 years? yes    5. For patients aged 21-65: Has the patient had a pap smear? yes    Health Maintenance: reviewed and discussed and ordered per Provider.     Health Maintenance Due   Topic Date Due    HIV screen  Never done    DTaP/Tdap/Td vaccine (1 - Tdap) Never done    Shingles vaccine (1 of 2) Never done    Diabetic retinal exam  05/29/2018    Diabetic Alb to Cr ratio (uACR) test  11/24/2022    Lipids  11/24/2022    A1C test (Diabetic or Prediabetic)  05/23/2023

## 2023-05-23 ENCOUNTER — OFFICE VISIT (OUTPATIENT)
Facility: CLINIC | Age: 63
End: 2023-05-23
Payer: COMMERCIAL

## 2023-05-23 VITALS
HEIGHT: 64 IN | RESPIRATION RATE: 15 BRPM | DIASTOLIC BLOOD PRESSURE: 83 MMHG | WEIGHT: 155 LBS | TEMPERATURE: 97.9 F | HEART RATE: 93 BPM | BODY MASS INDEX: 26.46 KG/M2 | SYSTOLIC BLOOD PRESSURE: 131 MMHG | OXYGEN SATURATION: 98 %

## 2023-05-23 DIAGNOSIS — E11.21 TYPE 2 DIABETES MELLITUS WITH NEPHROPATHY (HCC): Primary | ICD-10-CM

## 2023-05-23 DIAGNOSIS — N18.9 CHRONIC KIDNEY DISEASE, UNSPECIFIED CKD STAGE: ICD-10-CM

## 2023-05-23 LAB — HBA1C MFR BLD: 7.9 %

## 2023-05-23 PROCEDURE — 3074F SYST BP LT 130 MM HG: CPT

## 2023-05-23 PROCEDURE — 83036 HEMOGLOBIN GLYCOSYLATED A1C: CPT

## 2023-05-23 PROCEDURE — 3078F DIAST BP <80 MM HG: CPT

## 2023-05-23 PROCEDURE — 99213 OFFICE O/P EST LOW 20 MIN: CPT

## 2023-05-23 ASSESSMENT — PATIENT HEALTH QUESTIONNAIRE - PHQ9
SUM OF ALL RESPONSES TO PHQ QUESTIONS 1-9: 0
SUM OF ALL RESPONSES TO PHQ9 QUESTIONS 1 & 2: 0
SUM OF ALL RESPONSES TO PHQ QUESTIONS 1-9: 0
SUM OF ALL RESPONSES TO PHQ QUESTIONS 1-9: 0
2. FEELING DOWN, DEPRESSED OR HOPELESS: 0
SUM OF ALL RESPONSES TO PHQ QUESTIONS 1-9: 0
1. LITTLE INTEREST OR PLEASURE IN DOING THINGS: 0

## 2023-05-29 DIAGNOSIS — M05.751 RHEUMATOID ARTHRITIS INVOLVING RIGHT HIP WITH POSITIVE RHEUMATOID FACTOR (HCC): ICD-10-CM

## 2023-05-30 DIAGNOSIS — M05.751 RHEUMATOID ARTHRITIS INVOLVING RIGHT HIP WITH POSITIVE RHEUMATOID FACTOR (HCC): ICD-10-CM

## 2023-05-30 RX ORDER — HYDROXYCHLOROQUINE SULFATE 200 MG/1
200 TABLET, FILM COATED ORAL DAILY
Qty: 90 TABLET | Refills: 3 | Status: SHIPPED | OUTPATIENT
Start: 2023-05-30

## 2023-06-01 DIAGNOSIS — M05.751 RHEUMATOID ARTHRITIS INVOLVING RIGHT HIP WITH POSITIVE RHEUMATOID FACTOR (HCC): ICD-10-CM

## 2023-06-01 RX ORDER — HYDROXYCHLOROQUINE SULFATE 200 MG/1
TABLET, FILM COATED ORAL
Qty: 30 TABLET | Refills: 2 | OUTPATIENT
Start: 2023-06-01

## 2023-06-02 RX ORDER — HYDROXYCHLOROQUINE SULFATE 200 MG/1
TABLET, FILM COATED ORAL
Qty: 30 TABLET | OUTPATIENT
Start: 2023-06-02

## 2023-06-05 RX ORDER — DULAGLUTIDE 1.5 MG/.5ML
INJECTION, SOLUTION SUBCUTANEOUS
Qty: 2 ML | Refills: 12 | Status: SHIPPED | OUTPATIENT
Start: 2023-06-05

## 2023-07-20 ENCOUNTER — OFFICE VISIT (OUTPATIENT)
Facility: CLINIC | Age: 63
End: 2023-07-20
Payer: COMMERCIAL

## 2023-07-20 ENCOUNTER — HOSPITAL ENCOUNTER (OUTPATIENT)
Facility: HOSPITAL | Age: 63
Setting detail: SPECIMEN
Discharge: HOME | End: 2023-07-20
Payer: COMMERCIAL

## 2023-07-20 VITALS
OXYGEN SATURATION: 97 % | BODY MASS INDEX: 25.4 KG/M2 | WEIGHT: 148.8 LBS | SYSTOLIC BLOOD PRESSURE: 117 MMHG | TEMPERATURE: 97.1 F | HEART RATE: 84 BPM | RESPIRATION RATE: 22 BRPM | HEIGHT: 64 IN | DIASTOLIC BLOOD PRESSURE: 77 MMHG

## 2023-07-20 DIAGNOSIS — J06.9 VIRAL URI: Primary | ICD-10-CM

## 2023-07-20 DIAGNOSIS — E11.21 TYPE 2 DIABETES MELLITUS WITH NEPHROPATHY (HCC): ICD-10-CM

## 2023-07-20 DIAGNOSIS — M05.751 RHEUMATOID ARTHRITIS INVOLVING RIGHT HIP WITH POSITIVE RHEUMATOID FACTOR (HCC): ICD-10-CM

## 2023-07-20 LAB
CHOLEST SERPL-MCNC: 265 MG/DL
CREAT UR-MCNC: 211 MG/DL (ref 30–125)
HDLC SERPL-MCNC: 62 MG/DL (ref 40–60)
HDLC SERPL: 4.3 (ref 0–5)
LDLC SERPL CALC-MCNC: 150.6 MG/DL (ref 0–100)
LIPID PANEL: ABNORMAL
MICROALBUMIN UR-MCNC: 1.78 MG/DL (ref 0–3)
MICROALBUMIN/CREAT UR-RTO: 8 MG/G (ref 0–30)
TRIGL SERPL-MCNC: 262 MG/DL
VLDLC SERPL CALC-MCNC: 52.4 MG/DL

## 2023-07-20 PROCEDURE — 99213 OFFICE O/P EST LOW 20 MIN: CPT | Performed by: FAMILY MEDICINE

## 2023-07-20 PROCEDURE — 82570 ASSAY OF URINE CREATININE: CPT

## 2023-07-20 PROCEDURE — 82043 UR ALBUMIN QUANTITATIVE: CPT

## 2023-07-20 PROCEDURE — 80061 LIPID PANEL: CPT

## 2023-07-20 PROCEDURE — 36415 COLL VENOUS BLD VENIPUNCTURE: CPT

## 2023-07-20 PROCEDURE — 3078F DIAST BP <80 MM HG: CPT | Performed by: FAMILY MEDICINE

## 2023-07-20 PROCEDURE — 3074F SYST BP LT 130 MM HG: CPT | Performed by: FAMILY MEDICINE

## 2023-07-20 RX ORDER — ACETAMINOPHEN AND CODEINE PHOSPHATE 300; 30 MG/1; MG/1
1 TABLET ORAL EVERY 6 HOURS PRN
Qty: 12 TABLET | Refills: 0 | Status: SHIPPED | OUTPATIENT
Start: 2023-07-20 | End: 2023-07-23

## 2023-07-20 SDOH — ECONOMIC STABILITY: FOOD INSECURITY: WITHIN THE PAST 12 MONTHS, THE FOOD YOU BOUGHT JUST DIDN'T LAST AND YOU DIDN'T HAVE MONEY TO GET MORE.: NEVER TRUE

## 2023-07-20 SDOH — ECONOMIC STABILITY: INCOME INSECURITY: HOW HARD IS IT FOR YOU TO PAY FOR THE VERY BASICS LIKE FOOD, HOUSING, MEDICAL CARE, AND HEATING?: NOT HARD AT ALL

## 2023-07-20 SDOH — ECONOMIC STABILITY: FOOD INSECURITY: WITHIN THE PAST 12 MONTHS, YOU WORRIED THAT YOUR FOOD WOULD RUN OUT BEFORE YOU GOT MONEY TO BUY MORE.: NEVER TRUE

## 2023-07-20 ASSESSMENT — PATIENT HEALTH QUESTIONNAIRE - PHQ9
SUM OF ALL RESPONSES TO PHQ QUESTIONS 1-9: 2
7. TROUBLE CONCENTRATING ON THINGS, SUCH AS READING THE NEWSPAPER OR WATCHING TELEVISION: 0
1. LITTLE INTEREST OR PLEASURE IN DOING THINGS: 0
5. POOR APPETITE OR OVEREATING: 1
6. FEELING BAD ABOUT YOURSELF - OR THAT YOU ARE A FAILURE OR HAVE LET YOURSELF OR YOUR FAMILY DOWN: 0
3. TROUBLE FALLING OR STAYING ASLEEP: 0
9. THOUGHTS THAT YOU WOULD BE BETTER OFF DEAD, OR OF HURTING YOURSELF: 0
SUM OF ALL RESPONSES TO PHQ QUESTIONS 1-9: 2
SUM OF ALL RESPONSES TO PHQ9 QUESTIONS 1 & 2: 0
SUM OF ALL RESPONSES TO PHQ QUESTIONS 1-9: 2
4. FEELING TIRED OR HAVING LITTLE ENERGY: 1
8. MOVING OR SPEAKING SO SLOWLY THAT OTHER PEOPLE COULD HAVE NOTICED. OR THE OPPOSITE, BEING SO FIGETY OR RESTLESS THAT YOU HAVE BEEN MOVING AROUND A LOT MORE THAN USUAL: 0
2. FEELING DOWN, DEPRESSED OR HOPELESS: 0
SUM OF ALL RESPONSES TO PHQ QUESTIONS 1-9: 2

## 2023-07-20 NOTE — PROGRESS NOTES
Yisel Decker is a 61 y.o. presents today for   Chief Complaint   Patient presents with    Cough     C/o cough, migraine headaches, sinus congestion, body aches        Is someone accompanying this pt? No    Is the patient using any DME equipment during OV? No    Depression Screening:   PHQ-9 Questionaire 6/1/2023 5/23/2023 3/13/2023 2/21/2023 8/26/2022 1/24/2022   Little interest or pleasure in doing things 0 0 0 0 0 0   Feeling down, depressed, or hopeless 0 0 0 0 0 0   Trouble falling or staying asleep, or sleeping too much - - 0 0 - -   Feeling tired or having little energy - - 0 0 - -   Poor appetite or overeating - - 0 0 - -   Feeling bad about yourself - or that you are a failure or have let yourself or your family down - - 0 0 - -   Trouble concentrating on things, such as reading the newspaper or watching television - - 0 0 - -   Moving or speaking so slowly that other people could have noticed. Or the opposite - being so fidgety or restless that you have been moving around a lot more than usual - - 0 0 - -   Thoughts that you would be better off dead, or of hurting yourself in some way - - 0 0 - -   PHQ-9 Total Score 0 0 0 0 0 0   If you checked off any problems, how difficult have these problems made it for you to do your work, take care of things at home, or get along with other people? - - 0 0 - -       Abuse Screening: AMB Abuse Screening 5/2/2023 3/13/2023 2/21/2023   Do you ever feel afraid of your partner? N N N   Are you in a relationship with someone who physically or mentally threatens you? N N N   Is it safe for you to go home? Y Y Y       Learning Assessment:  No question data found. Fall Risk:  Fall Risk 5/2/2023 3/13/2023 2/21/2023   2 or more falls in past year? no no no   Fall with injury in past year? no no no           Coordination of Care:   1. \"Have you been to the ER, urgent care clinic since your last visit? Hospitalized since your last visit? \" No    2.  \"Have you seen or

## 2023-07-28 ENCOUNTER — OFFICE VISIT (OUTPATIENT)
Facility: CLINIC | Age: 63
End: 2023-07-28
Payer: COMMERCIAL

## 2023-07-28 VITALS
WEIGHT: 147.2 LBS | BODY MASS INDEX: 25.13 KG/M2 | TEMPERATURE: 97.1 F | SYSTOLIC BLOOD PRESSURE: 135 MMHG | OXYGEN SATURATION: 97 % | DIASTOLIC BLOOD PRESSURE: 86 MMHG | HEIGHT: 64 IN | RESPIRATION RATE: 16 BRPM | HEART RATE: 59 BPM

## 2023-07-28 DIAGNOSIS — M05.751 RHEUMATOID ARTHRITIS INVOLVING RIGHT HIP WITH POSITIVE RHEUMATOID FACTOR (HCC): ICD-10-CM

## 2023-07-28 DIAGNOSIS — E55.9 VITAMIN D DEFICIENCY: ICD-10-CM

## 2023-07-28 DIAGNOSIS — M21.41 FLAT FOOT (PES PLANUS) (ACQUIRED), RIGHT FOOT: ICD-10-CM

## 2023-07-28 DIAGNOSIS — Z01.818 PRE-OP EXAM: Primary | ICD-10-CM

## 2023-07-28 DIAGNOSIS — F33.41 RECURRENT MAJOR DEPRESSIVE DISORDER, IN PARTIAL REMISSION (HCC): ICD-10-CM

## 2023-07-28 DIAGNOSIS — E11.21 TYPE 2 DIABETES MELLITUS WITH NEPHROPATHY (HCC): ICD-10-CM

## 2023-07-28 PROCEDURE — 99214 OFFICE O/P EST MOD 30 MIN: CPT | Performed by: FAMILY MEDICINE

## 2023-07-28 PROCEDURE — 3075F SYST BP GE 130 - 139MM HG: CPT | Performed by: FAMILY MEDICINE

## 2023-07-28 PROCEDURE — 3079F DIAST BP 80-89 MM HG: CPT | Performed by: FAMILY MEDICINE

## 2023-07-28 RX ORDER — ERGOCALCIFEROL 1.25 MG/1
50000 CAPSULE ORAL WEEKLY
Qty: 12 CAPSULE | Refills: 4 | Status: SHIPPED | OUTPATIENT
Start: 2023-07-28

## 2023-07-28 SDOH — ECONOMIC STABILITY: FOOD INSECURITY: WITHIN THE PAST 12 MONTHS, YOU WORRIED THAT YOUR FOOD WOULD RUN OUT BEFORE YOU GOT MONEY TO BUY MORE.: NEVER TRUE

## 2023-07-28 SDOH — ECONOMIC STABILITY: INCOME INSECURITY: HOW HARD IS IT FOR YOU TO PAY FOR THE VERY BASICS LIKE FOOD, HOUSING, MEDICAL CARE, AND HEATING?: NOT HARD AT ALL

## 2023-07-28 SDOH — ECONOMIC STABILITY: FOOD INSECURITY: WITHIN THE PAST 12 MONTHS, THE FOOD YOU BOUGHT JUST DIDN'T LAST AND YOU DIDN'T HAVE MONEY TO GET MORE.: NEVER TRUE

## 2023-07-28 ASSESSMENT — PATIENT HEALTH QUESTIONNAIRE - PHQ9
9. THOUGHTS THAT YOU WOULD BE BETTER OFF DEAD, OR OF HURTING YOURSELF: 0
5. POOR APPETITE OR OVEREATING: 0
8. MOVING OR SPEAKING SO SLOWLY THAT OTHER PEOPLE COULD HAVE NOTICED. OR THE OPPOSITE, BEING SO FIGETY OR RESTLESS THAT YOU HAVE BEEN MOVING AROUND A LOT MORE THAN USUAL: 0
1. LITTLE INTEREST OR PLEASURE IN DOING THINGS: 0
SUM OF ALL RESPONSES TO PHQ QUESTIONS 1-9: 0
SUM OF ALL RESPONSES TO PHQ QUESTIONS 1-9: 0
SUM OF ALL RESPONSES TO PHQ9 QUESTIONS 1 & 2: 0
SUM OF ALL RESPONSES TO PHQ QUESTIONS 1-9: 0
2. FEELING DOWN, DEPRESSED OR HOPELESS: 0
3. TROUBLE FALLING OR STAYING ASLEEP: 0
10. IF YOU CHECKED OFF ANY PROBLEMS, HOW DIFFICULT HAVE THESE PROBLEMS MADE IT FOR YOU TO DO YOUR WORK, TAKE CARE OF THINGS AT HOME, OR GET ALONG WITH OTHER PEOPLE: 0
6. FEELING BAD ABOUT YOURSELF - OR THAT YOU ARE A FAILURE OR HAVE LET YOURSELF OR YOUR FAMILY DOWN: 0
SUM OF ALL RESPONSES TO PHQ QUESTIONS 1-9: 0
2. FEELING DOWN, DEPRESSED OR HOPELESS: 0
1. LITTLE INTEREST OR PLEASURE IN DOING THINGS: 0
SUM OF ALL RESPONSES TO PHQ QUESTIONS 1-9: 0
4. FEELING TIRED OR HAVING LITTLE ENERGY: 0
7. TROUBLE CONCENTRATING ON THINGS, SUCH AS READING THE NEWSPAPER OR WATCHING TELEVISION: 0
SUM OF ALL RESPONSES TO PHQ9 QUESTIONS 1 & 2: 0
SUM OF ALL RESPONSES TO PHQ QUESTIONS 1-9: 0

## 2023-07-28 NOTE — PROGRESS NOTES
Irais Kumar is a 61 y.o.  female and presents with    Chief Complaint   Patient presents with    Pre-op Exam           Subjective:  Pre op Physical   Patient here for a pre op physical exam.The patient reports problems - pes planus on right with surgery scheduled with dr. Lefty Garcia you take any herbs or supplements that were not prescribed by a doctor? no Are you taking calcium supplements? no Are you taking aspirin daily? not applicable    Diabetes Mellitus:  She has diabetes mellitus, and  Hyperlipidemia. Diabetic ROS - medication compliance: compliant most of the time, diabetic diet compliance: noncompliant some of the time. Lab review: labs reviewed, hgb a1c     Depression Review:  Patient is seen for followup of depression. Treatment includes none and no other therapies. Ongoing symptoms include depressed mood, anhedonia, insomnia, fatigue, feelings of worthlessness/guilt, difficulty concentrating and hopelessness. She denies hypersomnia, impaired memory and recurrent thoughts of death. She experiences the following side effects from the treatment: none. Anxiety Review:  Patient is seen for sleep disturbance. Current treatment includes doxepin and no other therapies. Ongoing symptoms include: insomnia. Patient denies: palpitations, sweating, chest pain, shortness of breath, dizziness, paresthesias, racing thoughts, psychomotor agitation, feelings of losing control, difficulty concentrating, suicidal ideation. Reported side effects from the treatment: none. ROS   Constitutional: Negative for activity change and appetite change. HENT: Negative for drooling and facial swelling. Eyes: Negative for pain and discharge. Respiratory: Negative for apnea and choking. Cardiovascular: Negative for palpitations and leg swelling. Gastrointestinal: resolved abdominal pain, diarrhea, nausea and vomiting. Negative for blood in stool and rectal pain.    Endocrine: Negative

## 2023-08-23 NOTE — DISCHARGE INSTRUCTIONS
Patient Education        Foot Pain: Care Instructions  Your Care Instructions  Foot injuries that cause pain and swelling are fairly common. Almost all sports or home repair projects can cause a misstep that ends up as foot pain. Normal wear and tear, especially as you get older, also can cause foot pain. Most minor foot injuries will heal on their own, and home treatment is usually all you need to do. If you have a severe injury, you may need tests and treatment. Follow-up care is a key part of your treatment and safety. Be sure to make and go to all appointments, and call your doctor if you are having problems. It's also a good idea to know your test results and keep a list of the medicines you take. How can you care for yourself at home? · Take pain medicines exactly as directed. ? If the doctor gave you a prescription medicine for pain, take it as prescribed. ? If you are not taking a prescription pain medicine, ask your doctor if you can take an over-the-counter medicine. · Rest and protect your foot. Take a break from any activity that may cause pain. · Put ice or a cold pack on your foot for 10 to 20 minutes at a time. Put a thin cloth between the ice and your skin. · Prop up the sore foot on a pillow when you ice it or anytime you sit or lie down during the next 3 days. Try to keep it above the level of your heart. This will help reduce swelling. · Your doctor may recommend that you wrap your foot with an elastic bandage. Keep your foot wrapped for as long as your doctor advises. · If your doctor recommends crutches, use them as directed. · Wear roomy footwear. · As soon as pain and swelling end, begin gentle exercises of your foot. Your doctor can tell you which exercises will help. When should you call for help? Call 911 anytime you think you may need emergency care. For example, call if:    · Your foot turns pale, white, blue, or cold.    Call your doctor now or seek immediate medical care if:    · You cannot move or stand on your foot.     · Your foot looks twisted or out of its normal position.     · Your foot is not stable when you step down.     · You have signs of infection, such as:  ? Increased pain, swelling, warmth, or redness. ? Red streaks leading from the sore area. ? Pus draining from a place on your foot. ? A fever.     · Your foot is numb or tingly. Watch closely for changes in your health, and be sure to contact your doctor if:    · You do not get better as expected.     · You have bruises from an injury that last longer than 2 weeks. Where can you learn more? Go to http://www.[employer].com/  Enter D999 in the search box to learn more about \"Foot Pain: Care Instructions. \"  Current as of: March 2, 2020               Content Version: 12.6  © 9271-7287 [employer]. Care instructions adapted under license by [employer] (which disclaims liability or warranty for this information). If you have questions about a medical condition or this instruction, always ask your healthcare professional. [address] any warranty or liability for your use of this information. 96

## 2023-09-05 ENCOUNTER — OFFICE VISIT (OUTPATIENT)
Facility: CLINIC | Age: 63
End: 2023-09-05
Payer: COMMERCIAL

## 2023-09-05 DIAGNOSIS — G45.9 TIA (TRANSIENT ISCHEMIC ATTACK): ICD-10-CM

## 2023-09-05 DIAGNOSIS — M21.41 FLAT FOOT (PES PLANUS) (ACQUIRED), RIGHT FOOT: ICD-10-CM

## 2023-09-05 DIAGNOSIS — E11.21 TYPE 2 DIABETES MELLITUS WITH NEPHROPATHY (HCC): Primary | ICD-10-CM

## 2023-09-05 DIAGNOSIS — G89.29 CHRONIC BILATERAL LOW BACK PAIN WITHOUT SCIATICA: ICD-10-CM

## 2023-09-05 DIAGNOSIS — M54.50 CHRONIC BILATERAL LOW BACK PAIN WITHOUT SCIATICA: ICD-10-CM

## 2023-09-05 DIAGNOSIS — J45.40 MODERATE PERSISTENT ASTHMA WITHOUT COMPLICATION: ICD-10-CM

## 2023-09-05 PROCEDURE — 99214 OFFICE O/P EST MOD 30 MIN: CPT | Performed by: FAMILY MEDICINE

## 2023-09-05 PROCEDURE — 3078F DIAST BP <80 MM HG: CPT | Performed by: FAMILY MEDICINE

## 2023-09-05 PROCEDURE — 3074F SYST BP LT 130 MM HG: CPT | Performed by: FAMILY MEDICINE

## 2023-09-05 RX ORDER — HYDROCODONE BITARTRATE AND ACETAMINOPHEN 5; 325 MG/1; MG/1
1 TABLET ORAL EVERY 8 HOURS PRN
Qty: 30 TABLET | Refills: 0 | Status: SHIPPED | OUTPATIENT
Start: 2023-09-05 | End: 2023-10-05

## 2023-09-05 RX ORDER — ATORVASTATIN CALCIUM 40 MG/1
40 TABLET, FILM COATED ORAL DAILY
Qty: 90 TABLET | Refills: 1 | Status: SHIPPED | OUTPATIENT
Start: 2023-09-05

## 2023-09-11 VITALS
TEMPERATURE: 97.2 F | DIASTOLIC BLOOD PRESSURE: 80 MMHG | BODY MASS INDEX: 25.27 KG/M2 | WEIGHT: 148 LBS | HEIGHT: 64 IN | OXYGEN SATURATION: 95 % | HEART RATE: 65 BPM | RESPIRATION RATE: 16 BRPM | SYSTOLIC BLOOD PRESSURE: 132 MMHG

## 2023-10-27 RX ORDER — DOXEPIN HYDROCHLORIDE 50 MG/1
50 CAPSULE ORAL EVERY EVENING
Qty: 90 CAPSULE | Refills: 3 | Status: SHIPPED | OUTPATIENT
Start: 2023-10-27

## 2023-10-31 ENCOUNTER — OFFICE VISIT (OUTPATIENT)
Facility: CLINIC | Age: 63
End: 2023-10-31
Payer: COMMERCIAL

## 2023-10-31 VITALS
RESPIRATION RATE: 20 BRPM | OXYGEN SATURATION: 94 % | TEMPERATURE: 97.7 F | DIASTOLIC BLOOD PRESSURE: 70 MMHG | WEIGHT: 145.2 LBS | BODY MASS INDEX: 24.79 KG/M2 | HEART RATE: 94 BPM | HEIGHT: 64 IN | SYSTOLIC BLOOD PRESSURE: 109 MMHG

## 2023-10-31 DIAGNOSIS — J45.40 MODERATE PERSISTENT ASTHMA WITHOUT COMPLICATION: Primary | ICD-10-CM

## 2023-10-31 DIAGNOSIS — G89.29 CHRONIC BILATERAL LOW BACK PAIN WITHOUT SCIATICA: ICD-10-CM

## 2023-10-31 DIAGNOSIS — M21.41 FLAT FOOT (PES PLANUS) (ACQUIRED), RIGHT FOOT: ICD-10-CM

## 2023-10-31 DIAGNOSIS — E11.21 TYPE 2 DIABETES MELLITUS WITH NEPHROPATHY (HCC): ICD-10-CM

## 2023-10-31 DIAGNOSIS — M54.50 CHRONIC BILATERAL LOW BACK PAIN WITHOUT SCIATICA: ICD-10-CM

## 2023-10-31 DIAGNOSIS — Z98.890 H/O FOOT SURGERY: ICD-10-CM

## 2023-10-31 PROCEDURE — 3074F SYST BP LT 130 MM HG: CPT | Performed by: FAMILY MEDICINE

## 2023-10-31 PROCEDURE — 99214 OFFICE O/P EST MOD 30 MIN: CPT | Performed by: FAMILY MEDICINE

## 2023-10-31 PROCEDURE — 3078F DIAST BP <80 MM HG: CPT | Performed by: FAMILY MEDICINE

## 2023-10-31 RX ORDER — HYDROCODONE BITARTRATE AND ACETAMINOPHEN 5; 325 MG/1; MG/1
1 TABLET ORAL EVERY 8 HOURS PRN
Qty: 30 TABLET | Refills: 0 | Status: SHIPPED | OUTPATIENT
Start: 2023-10-31 | End: 2023-11-30

## 2023-10-31 NOTE — PROGRESS NOTES
Selena Espinosa is a 61 y.o.  female and presents with    Chief Complaint   Patient presents with    Other     BACK PAIN     Subjective:  Ms. Peter Vasquez presents with her  for back pain; she has been lifting her mother more often with her decline in health. Diabetes Mellitus:  She has diabetes mellitus, and  Hyperlipidemia. Diabetic ROS - medication compliance: compliant most of the time, diabetic diet compliance: noncompliant some of the time. Lab review: labs reviewed, hgb a1c     Depression Review:  Patient is seen for followup of depression. Treatment includes none and no other therapies. Ongoing symptoms include depressed mood, anhedonia, insomnia, fatigue, feelings of worthlessness/guilt, difficulty concentrating and hopelessness. She denies hypersomnia, impaired memory and recurrent thoughts of death. She experiences the following side effects from the treatment: none. Anxiety Review:  Patient is seen for sleep disturbance. Current treatment includes doxepin and no other therapies. Ongoing symptoms include: insomnia. Patient denies: palpitations, sweating, chest pain, shortness of breath, dizziness, paresthesias, racing thoughts, psychomotor agitation, feelings of losing control, difficulty concentrating, suicidal ideation. Reported side effects from the treatment: none. ROS   Constitutional: Negative for activity change and appetite change. HENT: Negative for drooling and facial swelling. Eyes: Negative for pain and discharge. Respiratory: Negative for apnea and choking. Cardiovascular: Negative for palpitations and leg swelling. Gastrointestinal: Positive for abdominal pain, diarrhea, nausea and vomiting. Negative for blood in stool and rectal pain. Endocrine: Negative for polydipsia and polyphagia. Genitourinary: Negative for genital sores and hematuria.    Musculoskeletal: positive left shoulder pain  Skin: Negative for color change and
you been to the ER, urgent care clinic since your last visit? Hospitalized since your last visit? \" NO    2. \"Have you seen or consulted any other health care providers outside of the [address] [place] [address] since your last visit? \" ORTHO    3. For patients aged 43-73: Has the patient had a colonoscopy / FIT/ Cologuard? YES    If the patient is female:    4. For patients aged 43-66: Has the patient had a mammogram within the past 2 years? YES    5. For patients aged 21-65: Has the patient had a pap smear? NO    Health Maintenance: reviewed and discussed and ordered per Provider.     Health Maintenance Due   Topic Date Due    HIV screen  Never done    DTaP/Tdap/Td vaccine (1 - Tdap) Never done    Shingles vaccine (1 of 2) Never done    Hepatitis B vaccine (1 of 3 - Risk 3-dose series) Never done    COVID-19 Vaccine (6 - Moderna series) 12/22/2022    Flu vaccine (1) 08/01/2023    Diabetic foot exam  08/26/2023    GFR test (Diabetes, CKD 3-4, OR last GFR 15-59)  08/29/2023        Lyndsey Grant LPN  [employer]  Phone: [phone]  Fax: [phone]

## 2023-11-19 DIAGNOSIS — E11.21 TYPE 2 DIABETES MELLITUS WITH NEPHROPATHY (HCC): ICD-10-CM

## 2023-11-19 DIAGNOSIS — N18.9 CHRONIC KIDNEY DISEASE, UNSPECIFIED CKD STAGE: ICD-10-CM

## 2023-11-20 RX ORDER — DAPAGLIFLOZIN 10 MG/1
10 TABLET, FILM COATED ORAL DAILY
Qty: 90 TABLET | Refills: 3 | Status: SHIPPED | OUTPATIENT
Start: 2023-11-20

## 2023-11-20 NOTE — TELEPHONE ENCOUNTER
Medication(s) requesting:   Requested Prescriptions     Pending Prescriptions Disp Refills    FARXIGA 10 MG tablet [Pharmacy Med Name: FARXIGA 10 MG TABLET] 30 tablet 3     Sig: take 1 tablet by mouth once daily       Last office visit:  10/31/2023  Next office visit DMA: Visit date not found

## 2023-12-22 RX ORDER — AMLODIPINE BESYLATE 5 MG/1
5 TABLET ORAL DAILY
Qty: 90 TABLET | Refills: 3 | OUTPATIENT
Start: 2023-12-22

## 2023-12-29 RX ORDER — AMLODIPINE BESYLATE 5 MG/1
5 TABLET ORAL DAILY
Qty: 90 TABLET | OUTPATIENT
Start: 2023-12-29

## 2024-01-09 ENCOUNTER — HOSPITAL ENCOUNTER (OUTPATIENT)
Facility: HOSPITAL | Age: 64
Setting detail: SPECIMEN
Discharge: HOME | End: 2024-01-12
Payer: COMMERCIAL

## 2024-01-09 ENCOUNTER — OFFICE VISIT (OUTPATIENT)
Facility: CLINIC | Age: 64
End: 2024-01-09
Payer: COMMERCIAL

## 2024-01-09 VITALS
TEMPERATURE: 97.5 F | RESPIRATION RATE: 17 BRPM | OXYGEN SATURATION: 96 % | HEART RATE: 85 BPM | HEIGHT: 64 IN | BODY MASS INDEX: 26.6 KG/M2 | DIASTOLIC BLOOD PRESSURE: 85 MMHG | SYSTOLIC BLOOD PRESSURE: 131 MMHG | WEIGHT: 155.8 LBS

## 2024-01-09 DIAGNOSIS — Z00.00 ANNUAL PHYSICAL EXAM: ICD-10-CM

## 2024-01-09 DIAGNOSIS — M54.50 CHRONIC BILATERAL LOW BACK PAIN WITHOUT SCIATICA: ICD-10-CM

## 2024-01-09 DIAGNOSIS — B37.31 CANDIDA VAGINITIS: ICD-10-CM

## 2024-01-09 DIAGNOSIS — M05.751 RHEUMATOID ARTHRITIS INVOLVING RIGHT HIP WITH POSITIVE RHEUMATOID FACTOR (HCC): ICD-10-CM

## 2024-01-09 DIAGNOSIS — J45.40 MODERATE PERSISTENT ASTHMA WITHOUT COMPLICATION: ICD-10-CM

## 2024-01-09 DIAGNOSIS — E11.21 TYPE 2 DIABETES MELLITUS WITH NEPHROPATHY (HCC): ICD-10-CM

## 2024-01-09 DIAGNOSIS — Z23 NEEDS FLU SHOT: ICD-10-CM

## 2024-01-09 DIAGNOSIS — I10 ESSENTIAL HYPERTENSION, BENIGN: ICD-10-CM

## 2024-01-09 DIAGNOSIS — G89.29 CHRONIC BILATERAL LOW BACK PAIN WITHOUT SCIATICA: ICD-10-CM

## 2024-01-09 DIAGNOSIS — N94.10 DYSPAREUNIA IN FEMALE: Primary | ICD-10-CM

## 2024-01-09 DIAGNOSIS — Z09 HOSPITAL DISCHARGE FOLLOW-UP: ICD-10-CM

## 2024-01-09 LAB
ALBUMIN SERPL-MCNC: 3.6 G/DL (ref 3.4–5)
ALBUMIN/GLOB SERPL: 1 (ref 0.8–1.7)
ALP SERPL-CCNC: 176 U/L (ref 45–117)
ALT SERPL-CCNC: 58 U/L (ref 13–56)
ANION GAP SERPL CALC-SCNC: 3 MMOL/L (ref 3–18)
AST SERPL-CCNC: 28 U/L (ref 10–38)
BILIRUB SERPL-MCNC: 0.5 MG/DL (ref 0.2–1)
BUN SERPL-MCNC: 21 MG/DL (ref 7–18)
BUN/CREAT SERPL: 21 (ref 12–20)
CALCIUM SERPL-MCNC: 9.3 MG/DL (ref 8.5–10.1)
CHLORIDE SERPL-SCNC: 107 MMOL/L (ref 100–111)
CO2 SERPL-SCNC: 29 MMOL/L (ref 21–32)
CREAT SERPL-MCNC: 1.01 MG/DL (ref 0.6–1.3)
EST. AVERAGE GLUCOSE BLD GHB EST-MCNC: 174 MG/DL
GLOBULIN SER CALC-MCNC: 3.5 G/DL (ref 2–4)
GLUCOSE SERPL-MCNC: 176 MG/DL (ref 74–99)
HBA1C MFR BLD: 7.7 % (ref 4.2–5.6)
POTASSIUM SERPL-SCNC: 4.2 MMOL/L (ref 3.5–5.5)
PROT SERPL-MCNC: 7.1 G/DL (ref 6.4–8.2)
SODIUM SERPL-SCNC: 139 MMOL/L (ref 136–145)

## 2024-01-09 PROCEDURE — 3074F SYST BP LT 130 MM HG: CPT | Performed by: FAMILY MEDICINE

## 2024-01-09 PROCEDURE — 90471 IMMUNIZATION ADMIN: CPT | Performed by: FAMILY MEDICINE

## 2024-01-09 PROCEDURE — 99396 PREV VISIT EST AGE 40-64: CPT | Performed by: FAMILY MEDICINE

## 2024-01-09 PROCEDURE — 36415 COLL VENOUS BLD VENIPUNCTURE: CPT

## 2024-01-09 PROCEDURE — 80053 COMPREHEN METABOLIC PANEL: CPT

## 2024-01-09 PROCEDURE — 1111F DSCHRG MED/CURRENT MED MERGE: CPT | Performed by: FAMILY MEDICINE

## 2024-01-09 PROCEDURE — 83036 HEMOGLOBIN GLYCOSYLATED A1C: CPT

## 2024-01-09 PROCEDURE — 3079F DIAST BP 80-89 MM HG: CPT | Performed by: FAMILY MEDICINE

## 2024-01-09 PROCEDURE — 90674 CCIIV4 VAC NO PRSV 0.5 ML IM: CPT | Performed by: FAMILY MEDICINE

## 2024-01-09 RX ORDER — AMLODIPINE BESYLATE 10 MG/1
10 TABLET ORAL DAILY
Qty: 90 TABLET | Refills: 3 | Status: SHIPPED | OUTPATIENT
Start: 2024-01-09

## 2024-01-09 RX ORDER — FLUCONAZOLE 150 MG/1
150 TABLET ORAL ONCE
Qty: 1 TABLET | Refills: 0 | Status: SHIPPED | OUTPATIENT
Start: 2024-01-09 | End: 2024-01-09

## 2024-01-09 RX ORDER — HYDROCODONE BITARTRATE AND ACETAMINOPHEN 5; 325 MG/1; MG/1
1 TABLET ORAL EVERY 8 HOURS PRN
Qty: 30 TABLET | Refills: 0 | Status: SHIPPED | OUTPATIENT
Start: 2024-01-09 | End: 2024-01-23

## 2024-01-09 SDOH — ECONOMIC STABILITY: FOOD INSECURITY: WITHIN THE PAST 12 MONTHS, YOU WORRIED THAT YOUR FOOD WOULD RUN OUT BEFORE YOU GOT MONEY TO BUY MORE.: NEVER TRUE

## 2024-01-09 SDOH — ECONOMIC STABILITY: FOOD INSECURITY: WITHIN THE PAST 12 MONTHS, THE FOOD YOU BOUGHT JUST DIDN'T LAST AND YOU DIDN'T HAVE MONEY TO GET MORE.: NEVER TRUE

## 2024-01-09 SDOH — ECONOMIC STABILITY: INCOME INSECURITY: HOW HARD IS IT FOR YOU TO PAY FOR THE VERY BASICS LIKE FOOD, HOUSING, MEDICAL CARE, AND HEATING?: NOT HARD AT ALL

## 2024-01-09 ASSESSMENT — PATIENT HEALTH QUESTIONNAIRE - PHQ9
5. POOR APPETITE OR OVEREATING: 0
SUM OF ALL RESPONSES TO PHQ QUESTIONS 1-9: 0
10. IF YOU CHECKED OFF ANY PROBLEMS, HOW DIFFICULT HAVE THESE PROBLEMS MADE IT FOR YOU TO DO YOUR WORK, TAKE CARE OF THINGS AT HOME, OR GET ALONG WITH OTHER PEOPLE: 0
SUM OF ALL RESPONSES TO PHQ QUESTIONS 1-9: 0
8. MOVING OR SPEAKING SO SLOWLY THAT OTHER PEOPLE COULD HAVE NOTICED. OR THE OPPOSITE, BEING SO FIGETY OR RESTLESS THAT YOU HAVE BEEN MOVING AROUND A LOT MORE THAN USUAL: 0
4. FEELING TIRED OR HAVING LITTLE ENERGY: 0
SUM OF ALL RESPONSES TO PHQ9 QUESTIONS 1 & 2: 0
1. LITTLE INTEREST OR PLEASURE IN DOING THINGS: 0
9. THOUGHTS THAT YOU WOULD BE BETTER OFF DEAD, OR OF HURTING YOURSELF: 0
SUM OF ALL RESPONSES TO PHQ QUESTIONS 1-9: 0
6. FEELING BAD ABOUT YOURSELF - OR THAT YOU ARE A FAILURE OR HAVE LET YOURSELF OR YOUR FAMILY DOWN: 0
7. TROUBLE CONCENTRATING ON THINGS, SUCH AS READING THE NEWSPAPER OR WATCHING TELEVISION: 0
3. TROUBLE FALLING OR STAYING ASLEEP: 0
2. FEELING DOWN, DEPRESSED OR HOPELESS: 0
SUM OF ALL RESPONSES TO PHQ QUESTIONS 1-9: 0

## 2024-01-09 ASSESSMENT — ANXIETY QUESTIONNAIRES
2. NOT BEING ABLE TO STOP OR CONTROL WORRYING: 0
7. FEELING AFRAID AS IF SOMETHING AWFUL MIGHT HAPPEN: 0
5. BEING SO RESTLESS THAT IT IS HARD TO SIT STILL: 0
1. FEELING NERVOUS, ANXIOUS, OR ON EDGE: 0
3. WORRYING TOO MUCH ABOUT DIFFERENT THINGS: 0
4. TROUBLE RELAXING: 0
GAD7 TOTAL SCORE: 0
6. BECOMING EASILY ANNOYED OR IRRITABLE: 0
IF YOU CHECKED OFF ANY PROBLEMS ON THIS QUESTIONNAIRE, HOW DIFFICULT HAVE THESE PROBLEMS MADE IT FOR YOU TO DO YOUR WORK, TAKE CARE OF THINGS AT HOME, OR GET ALONG WITH OTHER PEOPLE: NOT DIFFICULT AT ALL

## 2024-01-09 ASSESSMENT — VISUAL ACUITY
OS_CC: 20/40
OD_CC: 20/40

## 2024-01-09 NOTE — PROGRESS NOTES
Linda Espinal is a 63 y.o.  female and presents with    Chief Complaint   Patient presents with    Annual Exam    Medication Refill    Follow-Up from Hospital       Subjective:  Well Adult Physical   Patient here for a comprehensive physical exam.The patient reports problems - recent ER visit for pelvic pain after intercourse; she had scant bleeding.  She has ongoing pelvic pain and does not have gynecologist.  Her  had some swelling after intercourse; she has itching.    Do you take any herbs or supplements that were not prescribed by a doctor? yes Are you taking calcium supplements? yes Are you taking aspirin daily? No, taking plavix    Diabetes Mellitus:  She has diabetes mellitus, and  Hyperlipidemia.   Diabetic ROS - medication compliance: compliant most of the time, diabetic diet compliance: noncompliant some of the time.   Lab review: labs reviewed, hgb a1c     Depression Review:  Patient is seen for followup of depression. Treatment includes none and no other therapies.   Ongoing symptoms include depressed mood, anhedonia, insomnia, fatigue, feelings of worthlessness/guilt, difficulty concentrating and hopelessness.  She denies hypersomnia, impaired memory and recurrent thoughts of death.   She experiences the following side effects from the treatment: none.  Anxiety Review:  Patient is seen for sleep disturbance. Current treatment includes doxepin and no other therapies.   Ongoing symptoms include: insomnia.   Patient denies: palpitations, sweating, chest pain, shortness of breath, dizziness, paresthesias, racing thoughts, psychomotor agitation, feelings of losing control, difficulty concentrating, suicidal ideation.   Reported side effects from the treatment: none.     ROS   Constitutional: Negative for activity change and appetite change.   HENT: Negative for drooling and facial swelling.    Eyes: Negative for pain and discharge.   Respiratory: Negative for apnea and choking.

## 2024-01-09 NOTE — PROGRESS NOTES
Linda Espinal is a 63 y.o. presents today for   Chief Complaint   Patient presents with    Annual Exam     Is someone accompanying this pt? yes,     Is the patient using any DME equipment during OV? no  There were no vitals filed for this visit.    Depression Screenin/9/2024     9:19 AM 2023     9:47 AM 2023     9:46 AM 2023     2:01 PM 2023    11:17 AM 2023     1:10 PM 3/13/2023     3:13 PM   PHQ-9 Questionaire   Little interest or pleasure in doing things 0 0 0 0 0 0 0   Feeling down, depressed, or hopeless 0 0 0 0 0 0 0   Trouble falling or staying asleep, or sleeping too much 0 0  0   0   Feeling tired or having little energy 0 0  1   0   Poor appetite or overeating 0 0  1   0   Feeling bad about yourself - or that you are a failure or have let yourself or your family down 0 0  0   0   Trouble concentrating on things, such as reading the newspaper or watching television 0 0  0   0   Moving or speaking so slowly that other people could have noticed. Or the opposite - being so fidgety or restless that you have been moving around a lot more than usual 0 0  0   0   Thoughts that you would be better off dead, or of hurting yourself in some way 0 0  0   0   PHQ-9 Total Score 0 0 0 2 0 0 0   If you checked off any problems, how difficult have these problems made it for you to do your work, take care of things at home, or get along with other people? 0 0     0        Abuse Screenin/2/2023    11:00 AM 3/13/2023     3:00 PM 2023    12:00 PM   AMB Abuse Screening   Do you ever feel afraid of your partner? N N N   Are you in a relationship with someone who physically or mentally threatens you? N N N   Is it safe for you to go home? Y Y Y        Learning Assessment Screening:   No question data found.     Fall Risk Screenin/2/2023    11:10 AM 3/13/2023     3:12 PM 2023    12:24 PM   Fall Risk   2 or more falls in past year? no no no   Fall with injury in

## 2024-01-16 RX ORDER — AMLODIPINE BESYLATE 5 MG/1
5 TABLET ORAL DAILY
Qty: 90 TABLET | OUTPATIENT
Start: 2024-01-16

## 2024-02-15 ENCOUNTER — TELEPHONE (OUTPATIENT)
Facility: CLINIC | Age: 64
End: 2024-02-15

## 2024-02-15 DIAGNOSIS — N18.9 CHRONIC KIDNEY DISEASE, UNSPECIFIED CKD STAGE: ICD-10-CM

## 2024-02-15 DIAGNOSIS — E11.21 TYPE 2 DIABETES MELLITUS WITH NEPHROPATHY (HCC): ICD-10-CM

## 2024-02-15 NOTE — TELEPHONE ENCOUNTER
Pa said it was to soon to get it refill . Spoke with Mrs Mckeon to let her know that she can call the pharmacy back and ask them to send it back threw

## 2024-02-15 NOTE — TELEPHONE ENCOUNTER
Patient came into the office to request a refill and prior authorization on the following medication: FARXIGA 10 MG tablet.  Patient is almost out of medication. Please advise, thank you.    LOV: 1/9/2024  Call back: [phone]

## 2024-02-20 DIAGNOSIS — E11.21 TYPE 2 DIABETES MELLITUS WITH NEPHROPATHY (HCC): ICD-10-CM

## 2024-02-20 DIAGNOSIS — G45.9 TIA (TRANSIENT ISCHEMIC ATTACK): ICD-10-CM

## 2024-02-20 RX ORDER — DAPAGLIFLOZIN 10 MG/1
10 TABLET, FILM COATED ORAL DAILY
Qty: 90 TABLET | Refills: 3 | Status: SHIPPED | OUTPATIENT
Start: 2024-02-20

## 2024-02-20 RX ORDER — ATORVASTATIN CALCIUM 40 MG/1
40 TABLET, FILM COATED ORAL DAILY
Qty: 90 TABLET | Refills: 3 | Status: SHIPPED | OUTPATIENT
Start: 2024-02-20

## 2024-03-26 ENCOUNTER — OFFICE VISIT (OUTPATIENT)
Facility: CLINIC | Age: 64
End: 2024-03-26
Payer: COMMERCIAL

## 2024-03-26 VITALS
BODY MASS INDEX: 26.53 KG/M2 | HEIGHT: 64 IN | TEMPERATURE: 97.5 F | WEIGHT: 155.4 LBS | RESPIRATION RATE: 20 BRPM | DIASTOLIC BLOOD PRESSURE: 80 MMHG | SYSTOLIC BLOOD PRESSURE: 116 MMHG | HEART RATE: 87 BPM | OXYGEN SATURATION: 97 %

## 2024-03-26 DIAGNOSIS — G45.9 TIA (TRANSIENT ISCHEMIC ATTACK): ICD-10-CM

## 2024-03-26 DIAGNOSIS — Z86.73 HISTORY OF RIGHT MCA STROKE: ICD-10-CM

## 2024-03-26 DIAGNOSIS — E11.21 TYPE 2 DIABETES MELLITUS WITH NEPHROPATHY (HCC): ICD-10-CM

## 2024-03-26 DIAGNOSIS — M54.50 CHRONIC BILATERAL LOW BACK PAIN WITHOUT SCIATICA: ICD-10-CM

## 2024-03-26 DIAGNOSIS — F43.21 GRIEVING: Primary | ICD-10-CM

## 2024-03-26 DIAGNOSIS — M21.41 FLAT FOOT (PES PLANUS) (ACQUIRED), RIGHT FOOT: ICD-10-CM

## 2024-03-26 DIAGNOSIS — G89.29 CHRONIC BILATERAL LOW BACK PAIN WITHOUT SCIATICA: ICD-10-CM

## 2024-03-26 DIAGNOSIS — B37.31 CANDIDA VAGINITIS: ICD-10-CM

## 2024-03-26 PROCEDURE — 3074F SYST BP LT 130 MM HG: CPT | Performed by: FAMILY MEDICINE

## 2024-03-26 PROCEDURE — 3051F HG A1C>EQUAL 7.0%<8.0%: CPT | Performed by: FAMILY MEDICINE

## 2024-03-26 PROCEDURE — 3079F DIAST BP 80-89 MM HG: CPT | Performed by: FAMILY MEDICINE

## 2024-03-26 PROCEDURE — 99214 OFFICE O/P EST MOD 30 MIN: CPT | Performed by: FAMILY MEDICINE

## 2024-03-26 RX ORDER — CLOPIDOGREL BISULFATE 75 MG/1
75 TABLET ORAL DAILY
Qty: 90 TABLET | Refills: 3 | Status: SHIPPED | OUTPATIENT
Start: 2024-03-26

## 2024-03-26 RX ORDER — FLUCONAZOLE 150 MG/1
150 TABLET ORAL
Qty: 6 TABLET | Refills: 0 | Status: SHIPPED | OUTPATIENT
Start: 2024-03-26 | End: 2024-04-13

## 2024-03-26 RX ORDER — HYDROCODONE BITARTRATE AND ACETAMINOPHEN 5; 325 MG/1; MG/1
1 TABLET ORAL EVERY 8 HOURS PRN
Qty: 30 TABLET | Refills: 0 | Status: SHIPPED | OUTPATIENT
Start: 2024-03-26 | End: 2024-04-25

## 2024-03-26 RX ORDER — DULAGLUTIDE 3 MG/.5ML
3 INJECTION, SOLUTION SUBCUTANEOUS WEEKLY
Qty: 4 ADJUSTABLE DOSE PRE-FILLED PEN SYRINGE | Refills: 2 | Status: SHIPPED | OUTPATIENT
Start: 2024-03-26

## 2024-03-26 ASSESSMENT — PATIENT HEALTH QUESTIONNAIRE - PHQ9
SUM OF ALL RESPONSES TO PHQ QUESTIONS 1-9: 1
2. FEELING DOWN, DEPRESSED OR HOPELESS: SEVERAL DAYS
1. LITTLE INTEREST OR PLEASURE IN DOING THINGS: NOT AT ALL
SUM OF ALL RESPONSES TO PHQ9 QUESTIONS 1 & 2: 1
SUM OF ALL RESPONSES TO PHQ QUESTIONS 1-9: 1

## 2024-03-26 NOTE — PROGRESS NOTES
Linda Espinal is a 64 y.o.  female and presents with    Chief Complaint   Patient presents with    Other     GENERALIZED PAIN  VAGINAL ITCHING       Subjective:  Vaginitis  Patient complains of an abnormal vaginal discharge for several days. Vaginal symptoms include discharge described as curd-like.Vulvar symptoms include vulvar itching.STI Risk: Very low risk of STD exposureDischarge described as: copious.Other associated symptoms: burning.Menstrual pattern: She had been bleeding never. Contraception: post menopausal status    She continues to mourn her mother's death.  Diabetes Mellitus:  She has diabetes mellitus, and  Hyperlipidemia.   Diabetic ROS - medication compliance: compliant most of the time, diabetic diet compliance: noncompliant some of the time.   Lab review: labs reviewed, hgb a1c     Depression Review:  Patient is seen for followup of depression. Treatment includes none and no other therapies.   Ongoing symptoms include depressed mood, anhedonia, insomnia, fatigue, feelings of worthlessness/guilt, difficulty concentrating and hopelessness.  She denies hypersomnia, impaired memory and recurrent thoughts of death.   She experiences the following side effects from the treatment: none.  Anxiety Review:  Patient is seen for sleep disturbance. Current treatment includes doxepin and no other therapies.   Ongoing symptoms include: insomnia.   Patient denies: palpitations, sweating, chest pain, shortness of breath, dizziness, paresthesias, racing thoughts, psychomotor agitation, feelings of losing control, difficulty concentrating, suicidal ideation.   Reported side effects from the treatment: none.     ROS   Constitutional: Negative for activity change and appetite change.   HENT: Negative for drooling and facial swelling.    Eyes: Negative for pain and discharge.   Respiratory: Negative for apnea and choking.    Cardiovascular: Negative for palpitations and leg swelling.

## 2024-03-26 NOTE — PROGRESS NOTES
Linda Espinal is a 64 y.o. presents today for No chief complaint on file.      Is someone accompanying this pt? NO    Is the patient using any DME equipment during OV? NO    Depression Screenin/9/2024     9:19 AM 2023     9:47 AM 2023     9:46 AM 2023     2:01 PM 2023    11:17 AM 2023     1:10 PM 3/13/2023     3:13 PM   PHQ-9 Questionaire   Little interest or pleasure in doing things 0 0 0 0 0 0 0   Feeling down, depressed, or hopeless 0 0 0 0 0 0 0   Trouble falling or staying asleep, or sleeping too much 0 0  0   0   Feeling tired or having little energy 0 0  1   0   Poor appetite or overeating 0 0  1   0   Feeling bad about yourself - or that you are a failure or have let yourself or your family down 0 0  0   0   Trouble concentrating on things, such as reading the newspaper or watching television 0 0  0   0   Moving or speaking so slowly that other people could have noticed. Or the opposite - being so fidgety or restless that you have been moving around a lot more than usual 0 0  0   0   Thoughts that you would be better off dead, or of hurting yourself in some way 0 0  0   0   PHQ-9 Total Score 0 0 0 2 0 0 0   If you checked off any problems, how difficult have these problems made it for you to do your work, take care of things at home, or get along with other people? 0 0     0       Abuse Screenin/9/2024     9:00 AM 2023    11:00 AM 3/13/2023     3:00 PM 2023    12:00 PM   AMB Abuse Screening   Do you ever feel afraid of your partner? N N N N   Are you in a relationship with someone who physically or mentally threatens you? N N N N   Is it safe for you to go home? Y Y Y Y       Learning Assessment:  No question data found.    Fall Risk:      2024     9:30 AM 2023    11:10 AM 3/13/2023     3:12 PM 2023    12:24 PM   Fall Risk   2 or more falls in past year? no no no no   Fall with injury in past year? no no no no           Coordination of

## 2024-03-28 NOTE — PROGRESS NOTES
Alvarado Fried presents today for   Chief Complaint   Patient presents with    Diabetes    Anxiety    Hypertension       Is someone accompanying this pt? no    Is the patient using any DME equipment during OV? no    Depression Screening:  3 most recent PHQ Screens 12/23/2021   Little interest or pleasure in doing things Not at all   Feeling down, depressed, irritable, or hopeless Not at all   Total Score PHQ 2 0       Learning Assessment:  Learning Assessment 1/27/2021   PRIMARY LEARNER Patient   HIGHEST LEVEL OF EDUCATION - PRIMARY LEARNER  -   BARRIERS PRIMARY LEARNER -   CO-LEARNER CAREGIVER -   PRIMARY LANGUAGE ENGLISH   LEARNER PREFERENCE PRIMARY DEMONSTRATION   ANSWERED BY patient   RELATIONSHIP SELF       Abuse Screening:  Abuse Screening Questionnaire 10/14/2020   Do you ever feel afraid of your partner? N   Are you in a relationship with someone who physically or mentally threatens you? N   Is it safe for you to go home? Y       Fall Risk  Fall Risk Assessment, last 12 mths 10/14/2020   Able to walk? Yes   Fall in past 12 months? No   Number of falls in past 12 months -   Fall with injury? -       Health Maintenance reviewed and discussed and ordered per Provider. Health Maintenance Due   Topic Date Due    DTaP/Tdap/Td series (1 - Tdap) Never done    Shingrix Vaccine Age 50> (1 of 2) Never done    Eye Exam Retinal or Dilated  05/29/2019    COVID-19 Vaccine (3 - Booster for Alayna Lied series) 09/12/2021   . Coordination of Care:  1. Have you been to the ER, urgent care clinic since your last visit? Hospitalized since your last visit? Yes, [facility]/21[facility], abd pain    2. Have you seen or consulted any other health care providers outside of the [address] [place] [address] since your last visit? Include any pap smears or colon screening. no      Last  Checked na  Last UDS Checked na  Last Pain contract signed: na    Patient presents in office today for routine care.   Patient concerns: med refills Male

## 2024-04-08 ENCOUNTER — TRANSCRIBE ORDERS (OUTPATIENT)
Facility: HOSPITAL | Age: 64
End: 2024-04-08

## 2024-04-08 DIAGNOSIS — Z12.31 VISIT FOR SCREENING MAMMOGRAM: Primary | ICD-10-CM

## 2024-04-10 ENCOUNTER — HOSPITAL ENCOUNTER (OUTPATIENT)
Dept: WOMENS IMAGING | Facility: HOSPITAL | Age: 64
Discharge: HOME | End: 2024-04-13
Attending: FAMILY MEDICINE
Payer: COMMERCIAL

## 2024-04-10 DIAGNOSIS — Z12.31 VISIT FOR SCREENING MAMMOGRAM: ICD-10-CM

## 2024-04-10 PROCEDURE — 77063 BREAST TOMOSYNTHESIS BI: CPT

## 2024-04-22 ENCOUNTER — TELEPHONE (OUTPATIENT)
Facility: CLINIC | Age: 64
End: 2024-04-22

## 2024-04-22 NOTE — TELEPHONE ENCOUNTER
----- Message from Avis Hsu sent at 4/22/2024  1:38 PM EDT -----  Subject: Message to Provider    QUESTIONS  Information for Provider? Patient is requesting a cortisone shot in back   for back pain. States her arthritis is acting up. Please call to   advise/schedule.   ---------------------------------------------------------------------------  --------------  CALL BACK INFO  [phone]; OK to leave message on voicemail  ---------------------------------------------------------------------------  --------------  SCRIPT ANSWERS  Relationship to Patient? Self  (Is the patient requesting to see the provider for a procedure?)? Yes

## 2024-04-22 NOTE — TELEPHONE ENCOUNTER
Spoke with patient and scheduled for tomorrow at 215    Nsaids Counseling: NSAID Counseling: I discussed with the patient that NSAIDs should be taken with food. Prolonged use of NSAIDs can result in the development of stomach ulcers.  Patient advised to stop taking NSAIDs if abdominal pain occurs.  The patient verbalized understanding of the proper use and possible adverse effects of NSAIDs.  All of the patient's questions and concerns were addressed.

## 2024-04-23 ENCOUNTER — OFFICE VISIT (OUTPATIENT)
Facility: CLINIC | Age: 64
End: 2024-04-23

## 2024-04-23 VITALS
WEIGHT: 148.6 LBS | BODY MASS INDEX: 25.37 KG/M2 | HEART RATE: 90 BPM | OXYGEN SATURATION: 97 % | RESPIRATION RATE: 20 BRPM | DIASTOLIC BLOOD PRESSURE: 85 MMHG | HEIGHT: 64 IN | TEMPERATURE: 97.6 F | SYSTOLIC BLOOD PRESSURE: 122 MMHG

## 2024-04-23 DIAGNOSIS — M54.50 CHRONIC BILATERAL LOW BACK PAIN WITHOUT SCIATICA: Primary | ICD-10-CM

## 2024-04-23 DIAGNOSIS — E11.21 TYPE 2 DIABETES MELLITUS WITH NEPHROPATHY (HCC): ICD-10-CM

## 2024-04-23 DIAGNOSIS — F33.41 RECURRENT MAJOR DEPRESSIVE DISORDER, IN PARTIAL REMISSION (HCC): ICD-10-CM

## 2024-04-23 DIAGNOSIS — G89.29 CHRONIC BILATERAL LOW BACK PAIN WITHOUT SCIATICA: Primary | ICD-10-CM

## 2024-04-23 DIAGNOSIS — F41.1 GENERALIZED ANXIETY DISORDER: ICD-10-CM

## 2024-04-23 RX ORDER — TRIAMCINOLONE ACETONIDE 40 MG/ML
40 INJECTION, SUSPENSION INTRA-ARTICULAR; INTRAMUSCULAR ONCE
Status: COMPLETED | OUTPATIENT
Start: 2024-04-23 | End: 2024-04-23

## 2024-04-23 RX ADMIN — TRIAMCINOLONE ACETONIDE 40 MG: 40 INJECTION, SUSPENSION INTRA-ARTICULAR; INTRAMUSCULAR at 14:40

## 2024-04-23 NOTE — PROGRESS NOTES
appetite change.   HENT: Negative for drooling and facial swelling.    Eyes: Negative for pain and discharge.   Respiratory: Negative for apnea and choking.    Cardiovascular: Negative for palpitations and leg swelling.   Gastrointestinal: Positive for abdominal pain, diarrhea, nausea and vomiting. Negative for blood in stool and rectal pain.   Endocrine: Negative for polydipsia and polyphagia.   Genitourinary: Negative for genital sores and hematuria.   Musculoskeletal: positive left shoulder pain  Skin: Negative for color change and rash.   Allergic/Immunologic: Negative for environmental allergies.   Neurological: Negative for tremors.   Hematological: Negative for adenopathy.   Psychiatric/Behavioral: Negative for agitation and behavioral problems      Objective:  Vitals:    04/23/24 1339   BP: 122/85   Pulse: 90   Resp: 20   Temp: 97.6 °F (36.4 °C)   SpO2: 97%         alert, well appearing, and in no distress, oriented to person, place, and time, and normal appearing weight  Mental status - anxious  Back exam - pain with motion noted during exam, tenderness noted bilateral lumbosacral  Neurological - cranial nerves II through XII intact    LABS   Hgb A1c 7.7  TESTS    [facility]   OFFICE PROCEDURE PROGRESS NOTE        Chart reviewed for the following:   INirav MD, have reviewed the History, Physical and updated the Allergic reactions for Linda Espinal     TIME OUT performed immediately prior to start of procedure:   Nirav TALBOT MD, have performed the following reviews on Linda Espinal prior to the start of the procedure:            * Patient was identified by name and date of birth   * Agreement on procedure being performed was verified  * Risks and Benefits explained to the patient  * Procedure site verified and marked as necessary  * Patient was positioned for comfort  * Understanding confirmed and consent was signed and verified     Time: .2:28 PM       Date of

## 2024-04-23 NOTE — PROGRESS NOTES
\"Have you been to the ER, urgent care clinic since your last visit?  Hospitalized since your last visit?\" NO    2. \"Have you seen or consulted any other health care providers outside of the [facility] since your last visit?\" NO    3. For patients aged 45-75: Has the patient had a colonoscopy / FIT/ Cologuard? YES    If the patient is female:    4. For patients aged 40-74: Has the patient had a mammogram within the past 2 years? YES    5. For patients aged 21-65: Has the patient had a pap smear? NO    Health Maintenance: reviewed and discussed and ordered per Provider.    Health Maintenance Due   Topic Date Due    HIV screen  Never done    DTaP/Tdap/Td vaccine (1 - Tdap) Never done    Shingles vaccine (1 of 2) Never done    Respiratory Syncytial Virus (RSV) Pregnant or age 60 yrs+ (1 - 1-dose 60+ series) Never done    COVID-19 Vaccine (6 - 2023-24 season) 09/01/2023        Gina Campoverde LPN  [facility]  Phone: [phone]  Fax: [phone]

## 2024-05-01 NOTE — ED PROVIDER NOTES
EMERGENCY DEPARTMENT HISTORY AND PHYSICAL EXAM 
 
5:59 PM 
 
 
Date: 9/4/2018 Patient Name: Jory Hinson History of Presenting Illness Chief Complaint Patient presents with  Abdominal Pain History Provided By: Patient Chief Complaint: Abd pain Duration: 2 Days Timing:  Waxing and Waning Location: rlq Quality: Enzo Evans Severity: Severe Modifying Factors: No modifying or aggravating factors were reported. Associated Symptoms: nausea, vomiting, diarrhea, diaphoresis, abd distension Additional History (Context): 6:05 PM Jory Hinson is a 62 y.o. female with h/o anemia, asthma, DM, HTN, GERD, cocaine abuse, and previous SBOs who presents to ED complaining of severe sharp waxing and waning right lower quadrant abd pain with onset nausea, vomiting, diarrhea, diaphoresis, abd distension with onset 2 days ago. Pt says that the pain started very suddenly. Last regular bowel movement was 2 days ago. Pt is tearful and concerned at bedside. Denies having pain medications at home. No modifying or aggravating factors were reported. No other concerns or symptoms at this time. PCP: Devon Mcgrath MD 
 
Current Outpatient Prescriptions Medication Sig Dispense Refill  traMADol (ULTRAM) 50 mg tablet Take 1 Tab by mouth every six (6) hours as needed for Pain. Max Daily Amount: 200 mg. 12 Tab 0  
 metFORMIN (GLUMETZA ER) 500 mg TG24 24 hour tablet take 1 tablet by mouth twice a day 60 Tab 2  
 magnesium citrate solution Take 1/3 of bottle every 8 hours until finished or until you have a bowel movement. 1 Bottle 0  
 ondansetron (ZOFRAN ODT) 4 mg disintegrating tablet Take 1 Tab by mouth every eight (8) hours as needed for Nausea. 12 Tab 0  
 bisoprolol-hydroCHLOROthiazide (ZIAC) 2.5-6.25 mg per tablet Take 1 Tab by mouth daily. 90 Tab 3  polyethylene glycol (MIRALAX) 17 gram/dose powder Take 17 g by mouth two (2) times a day.  1 capful with 8 oz of water daily 119 g 0  
  senna (SENOKOT) 8.6 mg tablet Take 1 Tab by mouth daily. 30 Tab 0  
 amLODIPine (NORVASC) 2.5 mg tablet take 1 tablet by mouth NIGHTLY 90 Tab 1  ALLERGY RELIEF, CETIRIZINE, 10 mg tablet take 1 tablet by mouth once daily  0  
 doxepin (SINEQUAN) 10 mg capsule Take 1 Cap by mouth nightly. 30 Cap 11  
 dapagliflozin (FARXIGA) 10 mg tab tablet Take 1 Tab by mouth daily. Indications: type 2 diabetes mellitus 90 Tab 3  
 omeprazole (PRILOSEC) 10 mg capsule Take 1 Cap by mouth daily. 30 Cap 11  
 albuterol (PROVENTIL HFA, VENTOLIN HFA, PROAIR HFA) 90 mcg/actuation inhaler Take 1 Puff by inhalation every six (6) hours as needed for Wheezing. 1 Inhaler 0  
 atorvastatin (LIPITOR) 40 mg tablet Take 1 Tab by mouth nightly. 90 Tab 3  
 glucose blood VI test strips (ONETOUCH ULTRA TEST) strip Check blood glucose as directed 100 Strip 1 Past History Past Medical History: 
Past Medical History:  
Diagnosis Date  Abdominal adhesions  Anemia  Arthritis   
 all over the body  Asthma  Cocaine abuse  Diabetes mellitus  Dyskinesia   
 bilateral  
 Essential hypertension  GERD (gastroesophageal reflux disease)  Hypertension  Menopause  Microhematuria  Stool color black Past Surgical History: 
Past Surgical History:  
Procedure Laterality Date  HX CHOLECYSTECTOMY  6/28/10  
 HX COLONOSCOPY    
 HX ENDOSCOPY    
 HX HERNIA REPAIR    
 HX HYSTERECTOMY Fibroids  HX KNEE REPLACEMENT Left  HX OOPHORECTOMY  12/2000  HX ORTHOPAEDIC Right   
 ankle- multiple surgeries  HX SMALL BOWEL RESECTION  12/2000  HX SMALL BOWEL RESECTION  02/21/2017 Dr. Flynn [address] [facility] Family History: 
Family History Problem Relation Age of Onset  Hypertension Other   
  parent  Breast Cancer Other 21  
 Heart Disease Father  Hypertension Father  Diabetes Father  Diabetes Mother  Hypertension Other sibling  Diabetes Other   
  parent  Diabetes Sister  Kidney Disease Brother  Diabetes Brother Social History: 
Social History Substance Use Topics  Smoking status: Never Smoker  Smokeless tobacco: Never Used  Alcohol use No  
 
 
Allergies: Allergies Allergen Reactions  Macrodantin [Nitrofurantoin Macrocrystalline] Itching and Other (comments)  Tape [Adhesive] Other (comments) Paper tape-- feels like burning skin Burned skin when had wound vac Review of Systems Review of Systems Constitutional: Positive for diaphoresis. Negative for chills and fever. HENT: Negative for ear pain and sore throat. Eyes: Negative for pain and visual disturbance. Respiratory: Negative for cough and shortness of breath. Cardiovascular: Negative for chest pain and palpitations. Gastrointestinal: Positive for abdominal distention, abdominal pain, diarrhea, nausea and vomiting. Genitourinary: Negative for flank pain. Musculoskeletal: Negative for back pain and neck pain. Neurological: Negative for syncope and headaches. Psychiatric/Behavioral: Negative for agitation. The patient is not nervous/anxious. Physical Exam  
 
Visit Vitals  BP (!) 176/125 (BP 1 Location: Right arm, BP Patient Position: At rest)  Pulse (!) 116  Temp 98.4 °F (36.9 °C)  Resp 16  SpO2 100% Physical Exam  
Constitutional: She is oriented to person, place, and time. She appears well-developed and well-nourished. She appears distressed. Pt is tearful. HENT:  
Head: Normocephalic and atraumatic. Mouth/Throat: Oropharynx is clear and moist.  
Eyes: Pupils are equal, round, and reactive to light. No scleral icterus. Neck: Neck supple. No tracheal deviation present. Cardiovascular: Regular rhythm. No murmur heard. Pulmonary/Chest: Effort normal and breath sounds normal. No respiratory distress. Abdominal: Soft. She exhibits distension (mild). Tenderness: worst in the rlq. Tympany on exam  
Musculoskeletal: Normal range of motion. She exhibits no deformity. Neurological: She is alert and oriented to person, place, and time. No gross neuro deficit Skin: Skin is warm and dry. No rash noted. She is not diaphoretic. Psychiatric: She has a normal mood and affect. Nursing note and vitals reviewed. Diagnostic Study Results Labs - Labs Reviewed CBC WITH AUTOMATED DIFF - Abnormal; Notable for the following:   
    Result Value NEUTROPHILS 79 (*) LYMPHOCYTES 15 (*) All other components within normal limits METABOLIC PANEL, COMPREHENSIVE - Abnormal; Notable for the following:   
 Glucose 263 (*) BUN 23 (*)   
 GFR est AA 58 (*)   
 GFR est non-AA 48 (*) Alk. phosphatase 177 (*) Protein, total 8.6 (*) Globulin 4.8 (*) All other components within normal limits URINALYSIS W/ RFLX MICROSCOPIC - Abnormal; Notable for the following:   
 Glucose >1000 (*) All other components within normal limits LIPASE Radiologic Studies -  
CT ABD PELV W CONT    (Results Pending) Medical Decision Making I am the first provider for this patient. I reviewed the vital signs, available nursing notes, past medical history, past surgical history, family history and social history. Vital Signs-Reviewed the patient's vital signs. Pulse Oximetry Analysis -  100% on room air  -stable Records Reviewed: Nursing Notes and Old Medical Records (Time of Review: 5:59 PM) MDM, Progress Notes, Reevaluation, and Consults: DDX: Ileus, partial obstruction, full obstruction, appendicitis, PATRIA, electrolyte abnormality, UTI  
 
ED Course Comment By Time 58F with hx multiple bowel obstructions presents with similar sxs (abd pain, n/v and loose stools since Sunday) with diffuse TTP, distension and tympany on exam concerning for obstruction vs ileus. Diagnostics show hyperglycemia without DKA, awaiting CT results. Pain and nausea control. Bulmaro Luevano,  09/04 1809 SIgned out to oncoming provider with plan to consult general surgery for any evidence of obstruction and/or persistent sxs. Bulmaro Luevano, DO 09/04 1851 The patient was given: 
Medications  
sodium chloride 0.9 % bolus infusion 1,000 mL (1,000 mL IntraVENous New Bag 9/4/18 1808) ondansetron (ZOFRAN) injection 4 mg (4 mg IntraVENous Given 9/4/18 1810) fentaNYL citrate (PF) injection 50 mcg (50 mcg IntraVENous Given 9/4/18 1809) iopamidol (ISOVUE 300) 61 % contrast injection 100 mL (90 mL IntraVENous Given 9/4/18 1847) Diagnosis Clinical Impression: 1. Diffuse abdominal pain 2. Nausea and vomiting, intractability of vomiting not specified, unspecified vomiting type Disposition: 6:56 PM : Pt care transferred to Dr. Tello Umana ,ED provider. History of patient complaint(s), available diagnostic reports and current treatment plan has been discussed thoroughly. Bedside rounding on patient occured : yes . Intended disposition of patient : TBD Pending diagnostics reports and/or labs (please list): CT abd results Follow-up Information None Patient's Medications Start Taking No medications on file Continue Taking ALBUTEROL (PROVENTIL HFA, VENTOLIN HFA, PROAIR HFA) 90 MCG/ACTUATION INHALER    Take 1 Puff by inhalation every six (6) hours as needed for Wheezing. ALLERGY RELIEF, CETIRIZINE, 10 MG TABLET    take 1 tablet by mouth once daily AMLODIPINE (NORVASC) 2.5 MG TABLET    take 1 tablet by mouth NIGHTLY  
 ATORVASTATIN (LIPITOR) 40 MG TABLET    Take 1 Tab by mouth nightly. BISOPROLOL-HYDROCHLOROTHIAZIDE (ZIAC) 2.5-6.25 MG PER TABLET    Take 1 Tab by mouth daily. DAPAGLIFLOZIN (FARXIGA) 10 MG TAB TABLET    Take 1 Tab by mouth daily. Indications: type 2 diabetes mellitus DOXEPIN (SINEQUAN) 10 MG CAPSULE    Take 1 Cap by mouth nightly. GLUCOSE BLOOD VI TEST STRIPS (ONETOUCH ULTRA TEST) STRIP    Check blood glucose as directed MAGNESIUM CITRATE SOLUTION    Take 1/3 of bottle every 8 hours until finished or until you have a bowel movement. METFORMIN (GLUMETZA ER) 500 MG TG24 24 HOUR TABLET    take 1 tablet by mouth twice a day OMEPRAZOLE (PRILOSEC) 10 MG CAPSULE    Take 1 Cap by mouth daily. ONDANSETRON (ZOFRAN ODT) 4 MG DISINTEGRATING TABLET    Take 1 Tab by mouth every eight (8) hours as needed for Nausea. POLYETHYLENE GLYCOL (MIRALAX) 17 GRAM/DOSE POWDER    Take 17 g by mouth two (2) times a day. 1 capful with 8 oz of water daily SENNA (SENOKOT) 8.6 MG TABLET    Take 1 Tab by mouth daily. TRAMADOL (ULTRAM) 50 MG TABLET    Take 1 Tab by mouth every six (6) hours as needed for Pain. Max Daily Amount: 200 mg. These Medications have changed No medications on file Stop Taking No medications on file  
 
_______________________________ Scribe Attestation Brent Brown acting as a scribe for and in the presence of Ozzy Montelongo DO September 04, 2018 at 6:59 PM 
    
Provider Attestation:     
I personally performed the services described in the documentation, reviewed the documentation, as recorded by the scribe in my presence, and it accurately and completely records my words and actions.  September 04, 2018 at [address] [place] [address], DO   
 
 
 Attending with

## 2024-05-22 ENCOUNTER — OFFICE VISIT (OUTPATIENT)
Facility: CLINIC | Age: 64
End: 2024-05-22
Payer: COMMERCIAL

## 2024-05-22 VITALS
OXYGEN SATURATION: 100 % | WEIGHT: 150.4 LBS | DIASTOLIC BLOOD PRESSURE: 87 MMHG | RESPIRATION RATE: 17 BRPM | TEMPERATURE: 97.3 F | HEIGHT: 64 IN | SYSTOLIC BLOOD PRESSURE: 138 MMHG | HEART RATE: 85 BPM | BODY MASS INDEX: 25.68 KG/M2

## 2024-05-22 DIAGNOSIS — M05.751 RHEUMATOID ARTHRITIS INVOLVING RIGHT HIP WITH POSITIVE RHEUMATOID FACTOR (HCC): ICD-10-CM

## 2024-05-22 DIAGNOSIS — Z09 HOSPITAL DISCHARGE FOLLOW-UP: Primary | ICD-10-CM

## 2024-05-22 DIAGNOSIS — M54.50 CHRONIC BILATERAL LOW BACK PAIN WITHOUT SCIATICA: ICD-10-CM

## 2024-05-22 DIAGNOSIS — G89.29 CHRONIC BILATERAL LOW BACK PAIN WITHOUT SCIATICA: ICD-10-CM

## 2024-05-22 DIAGNOSIS — M21.41 FLAT FOOT (PES PLANUS) (ACQUIRED), RIGHT FOOT: ICD-10-CM

## 2024-05-22 PROCEDURE — 96372 THER/PROPH/DIAG INJ SC/IM: CPT | Performed by: FAMILY MEDICINE

## 2024-05-22 PROCEDURE — 3075F SYST BP GE 130 - 139MM HG: CPT | Performed by: FAMILY MEDICINE

## 2024-05-22 PROCEDURE — 3079F DIAST BP 80-89 MM HG: CPT | Performed by: FAMILY MEDICINE

## 2024-05-22 PROCEDURE — 1111F DSCHRG MED/CURRENT MED MERGE: CPT | Performed by: FAMILY MEDICINE

## 2024-05-22 PROCEDURE — 99214 OFFICE O/P EST MOD 30 MIN: CPT | Performed by: FAMILY MEDICINE

## 2024-05-22 RX ORDER — KETOROLAC TROMETHAMINE 30 MG/ML
30 INJECTION, SOLUTION INTRAMUSCULAR; INTRAVENOUS ONCE
Status: COMPLETED | OUTPATIENT
Start: 2024-05-22 | End: 2024-05-22

## 2024-05-22 RX ORDER — HYDROCODONE BITARTRATE AND ACETAMINOPHEN 5; 325 MG/1; MG/1
1 TABLET ORAL EVERY 8 HOURS PRN
Qty: 30 TABLET | Refills: 0 | Status: SHIPPED | OUTPATIENT
Start: 2024-05-22 | End: 2024-06-21

## 2024-05-22 RX ORDER — KETOROLAC TROMETHAMINE 30 MG/ML
30 INJECTION, SOLUTION INTRAMUSCULAR; INTRAVENOUS ONCE
Qty: 1 ML | Refills: 0
Start: 2024-05-22 | End: 2024-05-22 | Stop reason: CLARIF

## 2024-05-22 RX ORDER — HYDROXYCHLOROQUINE SULFATE 200 MG/1
200 TABLET, FILM COATED ORAL DAILY
Qty: 90 TABLET | Refills: 3 | Status: SHIPPED | OUTPATIENT
Start: 2024-05-22

## 2024-05-22 RX ADMIN — KETOROLAC TROMETHAMINE 30 MG: 30 INJECTION, SOLUTION INTRAMUSCULAR; INTRAVENOUS at 11:53

## 2024-05-22 SDOH — ECONOMIC STABILITY: FOOD INSECURITY: WITHIN THE PAST 12 MONTHS, YOU WORRIED THAT YOUR FOOD WOULD RUN OUT BEFORE YOU GOT MONEY TO BUY MORE.: NEVER TRUE

## 2024-05-22 SDOH — ECONOMIC STABILITY: INCOME INSECURITY: HOW HARD IS IT FOR YOU TO PAY FOR THE VERY BASICS LIKE FOOD, HOUSING, MEDICAL CARE, AND HEATING?: NOT HARD AT ALL

## 2024-05-22 SDOH — ECONOMIC STABILITY: FOOD INSECURITY: WITHIN THE PAST 12 MONTHS, THE FOOD YOU BOUGHT JUST DIDN'T LAST AND YOU DIDN'T HAVE MONEY TO GET MORE.: NEVER TRUE

## 2024-05-22 ASSESSMENT — PATIENT HEALTH QUESTIONNAIRE - PHQ9
7. TROUBLE CONCENTRATING ON THINGS, SUCH AS READING THE NEWSPAPER OR WATCHING TELEVISION: NOT AT ALL
SUM OF ALL RESPONSES TO PHQ QUESTIONS 1-9: 0
5. POOR APPETITE OR OVEREATING: NOT AT ALL
9. THOUGHTS THAT YOU WOULD BE BETTER OFF DEAD, OR OF HURTING YOURSELF: NOT AT ALL
3. TROUBLE FALLING OR STAYING ASLEEP: NOT AT ALL
1. LITTLE INTEREST OR PLEASURE IN DOING THINGS: NOT AT ALL
SUM OF ALL RESPONSES TO PHQ QUESTIONS 1-9: 0
6. FEELING BAD ABOUT YOURSELF - OR THAT YOU ARE A FAILURE OR HAVE LET YOURSELF OR YOUR FAMILY DOWN: NOT AT ALL
2. FEELING DOWN, DEPRESSED OR HOPELESS: NOT AT ALL
SUM OF ALL RESPONSES TO PHQ QUESTIONS 1-9: 0
SUM OF ALL RESPONSES TO PHQ QUESTIONS 1-9: 0
SUM OF ALL RESPONSES TO PHQ9 QUESTIONS 1 & 2: 0
8. MOVING OR SPEAKING SO SLOWLY THAT OTHER PEOPLE COULD HAVE NOTICED. OR THE OPPOSITE, BEING SO FIGETY OR RESTLESS THAT YOU HAVE BEEN MOVING AROUND A LOT MORE THAN USUAL: NOT AT ALL
10. IF YOU CHECKED OFF ANY PROBLEMS, HOW DIFFICULT HAVE THESE PROBLEMS MADE IT FOR YOU TO DO YOUR WORK, TAKE CARE OF THINGS AT HOME, OR GET ALONG WITH OTHER PEOPLE: NOT DIFFICULT AT ALL
4. FEELING TIRED OR HAVING LITTLE ENERGY: NOT AT ALL

## 2024-05-22 NOTE — PROGRESS NOTES
Linda Espinal is a 64 y.o.  female and presents with    Chief Complaint   Patient presents with    Back Pain    Insomnia    Medication Refill    Follow-Up from Hospital     Subjective:  Ms. Espinal is following up for ER visit; she had incontinence of stool while in bed; she thought she had gas but had loose stool; no recurrent episode.  Her back pain was exacerbated prior to incontinence.     She has chronic back pain which initially responded to steroid injection; she has hydrocodone #2 tabs  She is using hydroxychoroquine for arthritis.    She is using zofran for nausea.     ROS     All other systems reviewed and are negative.      Objective:  Vitals:    05/22/24 0917   BP: 138/87   Pulse: 85   Resp: 17   Temp: 97.3 °F (36.3 °C)   SpO2: 100%     alert, well appearing, and in no distress, oriented to person, place, and time, and normal appearing weight  Mental status - depressed mood  Abd - soft, diffuse ttp; no rebound  Neurological - cranial nerves II through XII intact, antalgic gait      LABS     TESTS    Assessment/Plan:    1. Chronic bilateral low back pain without sciatica  This is a chronic problem that is worsened.  Per review of available records and patient’s , there are not sign of overuse, misuse, diversion, or concerning side effects. Today we reviewed: the risk of overdose, addiction, and dependency proper storage and disposal of medications the goals of treatment (improve functionality, quality of life, and pain) alternative treatment options including non-narcotic modalities the risks and benefits of continuing with a narcotic based pain regimen considering narcotic therapy discontinuation if benefits do not outweigh risks tapering to a lower dose  The following changes were made to the patient’s current treatment plan: medications adjusted, see orders.        - HYDROcodone-acetaminophen (NORCO) 5-325 MG per tablet; Take 1 tablet by mouth every 8 hours as needed for Pain

## 2024-05-22 NOTE — PROGRESS NOTES
Linda Espinal is a 64 y.o. presents today for   Chief Complaint   Patient presents with    Back Pain    Neck Pain     Is someone accompanying this pt? no    Is the patient using any DME equipment during OV? no  There were no vitals filed for this visit.    Depression Screening:       3/26/2024    11:29 AM 2024     9:19 AM 2023     9:47 AM 2023     9:46 AM 2023     2:01 PM 2023    11:17 AM 2023     1:10 PM   PHQ-9 Questionaire   Little interest or pleasure in doing things 0 0 0 0 0 0 0   Feeling down, depressed, or hopeless 1 0 0 0 0 0 0   Trouble falling or staying asleep, or sleeping too much  0 0  0     Feeling tired or having little energy  0 0  1     Poor appetite or overeating  0 0  1     Feeling bad about yourself - or that you are a failure or have let yourself or your family down  0 0  0     Trouble concentrating on things, such as reading the newspaper or watching television  0 0  0     Moving or speaking so slowly that other people could have noticed. Or the opposite - being so fidgety or restless that you have been moving around a lot more than usual  0 0  0     Thoughts that you would be better off dead, or of hurting yourself in some way  0 0  0     PHQ-9 Total Score 1 0 0 0 2 0 0   If you checked off any problems, how difficult have these problems made it for you to do your work, take care of things at home, or get along with other people?  0 0            Abuse Screenin/9/2024     9:00 AM 2023    11:00 AM 3/13/2023     3:00 PM 2023    12:00 PM   AMB Abuse Screening   Do you ever feel afraid of your partner? N N N N   Are you in a relationship with someone who physically or mentally threatens you? N N N N   Is it safe for you to go home? Y Y Y Y        Learning Assessment Screening:   No question data found.     Fall Risk Screenin/9/2024     9:30 AM 2023    11:10 AM 3/13/2023     3:12 PM 2023    12:24 PM   Fall Risk   2 or more falls

## 2024-05-22 NOTE — PROGRESS NOTES
*ATTENTION:  This note has been created by a medical student for educational purposes only.  Please do not refer to the content of this note for clinical decision-making, billing, or other purposes.  Please see attending physician’s note to obtain clinical information on this patient.*

## 2024-05-25 DIAGNOSIS — F33.41 RECURRENT MAJOR DEPRESSIVE DISORDER, IN PARTIAL REMISSION (HCC): ICD-10-CM

## 2024-05-28 RX ORDER — VENLAFAXINE HYDROCHLORIDE 37.5 MG/1
37.5 CAPSULE, EXTENDED RELEASE ORAL DAILY
Qty: 90 CAPSULE | Refills: 3 | Status: SHIPPED | OUTPATIENT
Start: 2024-05-28

## 2024-06-19 DIAGNOSIS — B37.31 CANDIDA VAGINITIS: ICD-10-CM

## 2024-06-19 NOTE — TELEPHONE ENCOUNTER
Medication requested :   Requested Prescriptions     Pending Prescriptions Disp Refills    fluconazole (DIFLUCAN) 150 MG tablet [Pharmacy Med Name: FLUCONAZOLE 150 MG TABLET] 6 tablet 0     Sig: take 1 tablet by mouth every 72 hours for 18 DAYS      PCP: Nirav Gupta MD  LOV:           4/23/2024   NOV DMA: 6/26/2024  FUTURE APPT:   Future Appointments   Date Time Provider Department Center   6/26/2024 11:15 AM Nirav Gupta MD [facility]       Thank you.

## 2024-06-20 RX ORDER — FLUCONAZOLE 150 MG/1
150 TABLET ORAL
Qty: 6 TABLET | Refills: 0 | OUTPATIENT
Start: 2024-06-20 | End: 2024-07-08

## 2024-06-26 ENCOUNTER — TELEMEDICINE (OUTPATIENT)
Facility: CLINIC | Age: 64
End: 2024-06-26
Payer: COMMERCIAL

## 2024-06-26 DIAGNOSIS — B37.31 CANDIDA VAGINITIS: ICD-10-CM

## 2024-06-26 DIAGNOSIS — E11.21 TYPE 2 DIABETES MELLITUS WITH NEPHROPATHY (HCC): Primary | ICD-10-CM

## 2024-06-26 PROCEDURE — 99214 OFFICE O/P EST MOD 30 MIN: CPT | Performed by: FAMILY MEDICINE

## 2024-06-26 PROCEDURE — 3051F HG A1C>EQUAL 7.0%<8.0%: CPT | Performed by: FAMILY MEDICINE

## 2024-06-26 RX ORDER — FLUCONAZOLE 150 MG/1
150 TABLET ORAL
Qty: 6 TABLET | Refills: 0 | Status: SHIPPED | OUTPATIENT
Start: 2024-06-26 | End: 2024-07-14

## 2024-06-26 SDOH — ECONOMIC STABILITY: FOOD INSECURITY: WITHIN THE PAST 12 MONTHS, YOU WORRIED THAT YOUR FOOD WOULD RUN OUT BEFORE YOU GOT MONEY TO BUY MORE.: NEVER TRUE

## 2024-06-26 SDOH — ECONOMIC STABILITY: INCOME INSECURITY: HOW HARD IS IT FOR YOU TO PAY FOR THE VERY BASICS LIKE FOOD, HOUSING, MEDICAL CARE, AND HEATING?: NOT VERY HARD

## 2024-06-26 SDOH — ECONOMIC STABILITY: FOOD INSECURITY: WITHIN THE PAST 12 MONTHS, THE FOOD YOU BOUGHT JUST DIDN'T LAST AND YOU DIDN'T HAVE MONEY TO GET MORE.: NEVER TRUE

## 2024-06-26 ASSESSMENT — ANXIETY QUESTIONNAIRES
1. FEELING NERVOUS, ANXIOUS, OR ON EDGE: NOT AT ALL
GAD7 TOTAL SCORE: 0
IF YOU CHECKED OFF ANY PROBLEMS ON THIS QUESTIONNAIRE, HOW DIFFICULT HAVE THESE PROBLEMS MADE IT FOR YOU TO DO YOUR WORK, TAKE CARE OF THINGS AT HOME, OR GET ALONG WITH OTHER PEOPLE: NOT DIFFICULT AT ALL
4. TROUBLE RELAXING: NOT AT ALL
6. BECOMING EASILY ANNOYED OR IRRITABLE: NOT AT ALL
7. FEELING AFRAID AS IF SOMETHING AWFUL MIGHT HAPPEN: NOT AT ALL
2. NOT BEING ABLE TO STOP OR CONTROL WORRYING: NOT AT ALL
3. WORRYING TOO MUCH ABOUT DIFFERENT THINGS: NOT AT ALL
5. BEING SO RESTLESS THAT IT IS HARD TO SIT STILL: NOT AT ALL

## 2024-06-26 ASSESSMENT — PATIENT HEALTH QUESTIONNAIRE - PHQ9
6. FEELING BAD ABOUT YOURSELF - OR THAT YOU ARE A FAILURE OR HAVE LET YOURSELF OR YOUR FAMILY DOWN: NOT AT ALL
SUM OF ALL RESPONSES TO PHQ9 QUESTIONS 1 & 2: 0
3. TROUBLE FALLING OR STAYING ASLEEP: NOT AT ALL
4. FEELING TIRED OR HAVING LITTLE ENERGY: NOT AT ALL
SUM OF ALL RESPONSES TO PHQ QUESTIONS 1-9: 0
1. LITTLE INTEREST OR PLEASURE IN DOING THINGS: NOT AT ALL
10. IF YOU CHECKED OFF ANY PROBLEMS, HOW DIFFICULT HAVE THESE PROBLEMS MADE IT FOR YOU TO DO YOUR WORK, TAKE CARE OF THINGS AT HOME, OR GET ALONG WITH OTHER PEOPLE: NOT DIFFICULT AT ALL
SUM OF ALL RESPONSES TO PHQ QUESTIONS 1-9: 0
2. FEELING DOWN, DEPRESSED OR HOPELESS: NOT AT ALL
SUM OF ALL RESPONSES TO PHQ QUESTIONS 1-9: 0
5. POOR APPETITE OR OVEREATING: NOT AT ALL
9. THOUGHTS THAT YOU WOULD BE BETTER OFF DEAD, OR OF HURTING YOURSELF: NOT AT ALL
7. TROUBLE CONCENTRATING ON THINGS, SUCH AS READING THE NEWSPAPER OR WATCHING TELEVISION: NOT AT ALL
8. MOVING OR SPEAKING SO SLOWLY THAT OTHER PEOPLE COULD HAVE NOTICED. OR THE OPPOSITE, BEING SO FIGETY OR RESTLESS THAT YOU HAVE BEEN MOVING AROUND A LOT MORE THAN USUAL: NOT AT ALL
SUM OF ALL RESPONSES TO PHQ QUESTIONS 1-9: 0

## 2024-06-26 NOTE — PROGRESS NOTES
2024    TELEHEALTH EVALUATION -- Audio/Visual    HPI:    Linda Espinal (:  1960) has requested an audio/video evaluation for the following concern(s):    Itching and burning in vagina - she has used antifungal while using farxiga for diabetes    Diabetes - difficulty filling trulicity due to supply    Review of Systems    Prior to Visit Medications    Medication Sig Taking? Authorizing Provider   venlafaxine (EFFEXOR XR) 37.5 MG extended release capsule take 1 capsule by mouth once daily Yes Nirav Gupta MD   hydroxychloroquine (PLAQUENIL) 200 MG tablet Take 1 tablet by mouth daily Yes Nirav Gupta MD   Dulaglutide (TRULICITY) 3 MG/0.5ML SOPN Inject 3 mg into the skin once a week Yes Nirav Gupta MD   clopidogrel (PLAVIX) 75 MG tablet Take 1 tablet by mouth daily Yes Nirav Gupta MD   dapagliflozin (FARXIGA) 10 MG tablet Take 1 tablet by mouth daily Yes Nirav Gupta MD   atorvastatin (LIPITOR) 40 MG tablet take 1 tablet by mouth once daily Yes Nirav Gupta MD   amLODIPine (NORVASC) 10 MG tablet Take 1 tablet by mouth daily Yes Nirav Gupta MD   bisoprolol-hydroCHLOROthiazide (ZIAC) 2.5-6.25 MG per tablet take 1 tablet by mouth once daily Yes Nirav Gupta MD   doxepin (SINEQUAN) 50 MG capsule take 1 capsule by mouth every evening Yes Nirav Gupta MD   albuterol sulfate (PROAIR RESPICLICK) 108 (90 Base) MCG/ACT aerosol powder inhalation Inhale 2 puffs into the lungs every 4 hours as needed for Wheezing or Shortness of Breath Yes Nirav Gupta MD   vitamin D (ERGOCALCIFEROL) 1.25 MG (29522 UT) CAPS capsule Take 1 capsule by mouth once a week Yes Nirav Gupta MD   fluticasone (FLONASE) 50 MCG/ACT nasal spray 1 spray by Each Nostril route daily Yes Nirav Gupta MD   albuterol (PROVENTIL) (2.5 MG/3ML) 0.083% nebulizer solution Take 3 mLs by nebulization every 6 hours as needed for Wheezing or Shortness of Breath Yes Nirav Gupta MD   Multiple Vitamins-Minerals (CENTRUM WOMEN PO)

## 2024-07-30 DIAGNOSIS — E55.9 VITAMIN D DEFICIENCY: ICD-10-CM

## 2024-07-30 RX ORDER — ERGOCALCIFEROL 1.25 MG/1
50000 CAPSULE ORAL WEEKLY
Qty: 12 CAPSULE | Refills: 4 | Status: SHIPPED | OUTPATIENT
Start: 2024-07-30

## 2024-07-30 NOTE — TELEPHONE ENCOUNTER
Medication requested :   Requested Prescriptions     Pending Prescriptions Disp Refills    vitamin D (ERGOCALCIFEROL) 1.25 MG (69347 UT) CAPS capsule [Pharmacy Med Name: VITAMIN D2 1.25MG(50,000 UNIT)] 12 capsule 4     Sig: take 1 capsule by mouth every week      PCP: Nirav Gupta MD  LOV:           6/26/2024   NOV DMA: 8/21/2024  FUTURE APPT:   Future Appointments   Date Time Provider Department Center   8/21/2024  8:15 AM Nirav Gupta MD [facility]       Thank you.

## 2024-08-08 RX ORDER — DOXEPIN HYDROCHLORIDE 50 MG/1
50 CAPSULE ORAL EVERY EVENING
Qty: 90 CAPSULE | Refills: 3 | Status: SHIPPED | OUTPATIENT
Start: 2024-08-08

## 2024-08-13 ENCOUNTER — HOSPITAL ENCOUNTER (INPATIENT)
Facility: HOSPITAL | Age: 64
LOS: 3 days | Discharge: HOME | End: 2024-08-16
Attending: HOSPITALIST | Admitting: INTERNAL MEDICINE
Payer: COMMERCIAL

## 2024-08-13 LAB
ALBUMIN SERPL-MCNC: 3.6 G/DL (ref 3.4–5)
ALBUMIN/GLOB SERPL: 1 (ref 0.8–1.7)
ALP SERPL-CCNC: 147 U/L (ref 45–117)
ALT SERPL-CCNC: 33 U/L (ref 13–56)
ANION GAP SERPL CALC-SCNC: 6 MMOL/L (ref 3–18)
AST SERPL-CCNC: 22 U/L (ref 10–38)
BASOPHILS # BLD: 0 K/UL (ref 0–0.1)
BASOPHILS NFR BLD: 0 % (ref 0–2)
BILIRUB SERPL-MCNC: 0.5 MG/DL (ref 0.2–1)
BUN SERPL-MCNC: 13 MG/DL (ref 7–18)
BUN/CREAT SERPL: 12 (ref 12–20)
CALCIUM SERPL-MCNC: 9.1 MG/DL (ref 8.5–10.1)
CHLORIDE SERPL-SCNC: 101 MMOL/L (ref 100–111)
CO2 SERPL-SCNC: 30 MMOL/L (ref 21–32)
CREAT SERPL-MCNC: 1.09 MG/DL (ref 0.6–1.3)
DIFFERENTIAL METHOD BLD: ABNORMAL
EOSINOPHIL # BLD: 0 K/UL (ref 0–0.4)
EOSINOPHIL NFR BLD: 0 % (ref 0–5)
ERYTHROCYTE [DISTWIDTH] IN BLOOD BY AUTOMATED COUNT: 13.1 % (ref 11.6–14.5)
EST. AVERAGE GLUCOSE BLD GHB EST-MCNC: 246 MG/DL
EST. AVERAGE GLUCOSE BLD GHB EST-MCNC: 252 MG/DL
GLOBULIN SER CALC-MCNC: 3.7 G/DL (ref 2–4)
GLUCOSE BLD STRIP.AUTO-MCNC: 184 MG/DL (ref 70–110)
GLUCOSE BLD STRIP.AUTO-MCNC: 202 MG/DL (ref 70–110)
GLUCOSE BLD STRIP.AUTO-MCNC: 228 MG/DL (ref 70–110)
GLUCOSE SERPL-MCNC: 242 MG/DL (ref 74–99)
HBA1C MFR BLD: 10.2 % (ref 4.2–5.6)
HBA1C MFR BLD: 10.4 % (ref 4.2–5.6)
HCT VFR BLD AUTO: 47.9 % (ref 35–45)
HGB BLD-MCNC: 15.6 G/DL (ref 12–16)
IMM GRANULOCYTES # BLD AUTO: 0 K/UL (ref 0–0.04)
IMM GRANULOCYTES NFR BLD AUTO: 0 % (ref 0–0.5)
LYMPHOCYTES # BLD: 0.9 K/UL (ref 0.9–3.6)
LYMPHOCYTES NFR BLD: 10 % (ref 21–52)
MAGNESIUM SERPL-MCNC: 2.9 MG/DL (ref 1.6–2.6)
MCH RBC QN AUTO: 29.1 PG (ref 24–34)
MCHC RBC AUTO-ENTMCNC: 32.6 G/DL (ref 31–37)
MCV RBC AUTO: 89.2 FL (ref 78–100)
MONOCYTES # BLD: 0.5 K/UL (ref 0.05–1.2)
MONOCYTES NFR BLD: 5 % (ref 3–10)
NEUTS SEG # BLD: 7.5 K/UL (ref 1.8–8)
NEUTS SEG NFR BLD: 84 % (ref 40–73)
NRBC # BLD: 0 K/UL (ref 0–0.01)
NRBC BLD-RTO: 0 PER 100 WBC
PLATELET # BLD AUTO: 240 K/UL (ref 135–420)
PMV BLD AUTO: 10.6 FL (ref 9.2–11.8)
POTASSIUM SERPL-SCNC: 3.9 MMOL/L (ref 3.5–5.5)
PROT SERPL-MCNC: 7.3 G/DL (ref 6.4–8.2)
RBC # BLD AUTO: 5.37 M/UL (ref 4.2–5.3)
SODIUM SERPL-SCNC: 137 MMOL/L (ref 136–145)
WBC # BLD AUTO: 8.9 K/UL (ref 4.6–13.2)

## 2024-08-13 PROCEDURE — 6360000002 HC RX W HCPCS: Performed by: SURGERY

## 2024-08-13 PROCEDURE — 94761 N-INVAS EAR/PLS OXIMETRY MLT: CPT

## 2024-08-13 PROCEDURE — 83036 HEMOGLOBIN GLYCOSYLATED A1C: CPT

## 2024-08-13 PROCEDURE — 36415 COLL VENOUS BLD VENIPUNCTURE: CPT

## 2024-08-13 PROCEDURE — 82962 GLUCOSE BLOOD TEST: CPT

## 2024-08-13 PROCEDURE — 83735 ASSAY OF MAGNESIUM: CPT

## 2024-08-13 PROCEDURE — 80053 COMPREHEN METABOLIC PANEL: CPT

## 2024-08-13 PROCEDURE — 85025 COMPLETE CBC W/AUTO DIFF WBC: CPT

## 2024-08-13 PROCEDURE — 1100000000 HC RM PRIVATE

## 2024-08-13 PROCEDURE — 99222 1ST HOSP IP/OBS MODERATE 55: CPT | Performed by: SURGERY

## 2024-08-13 PROCEDURE — 6370000000 HC RX 637 (ALT 250 FOR IP): Performed by: INTERNAL MEDICINE

## 2024-08-13 PROCEDURE — 99223 1ST HOSP IP/OBS HIGH 75: CPT | Performed by: INTERNAL MEDICINE

## 2024-08-13 PROCEDURE — 6360000002 HC RX W HCPCS: Performed by: INTERNAL MEDICINE

## 2024-08-13 PROCEDURE — 2580000003 HC RX 258: Performed by: INTERNAL MEDICINE

## 2024-08-13 RX ORDER — DOXEPIN HYDROCHLORIDE 50 MG/1
50 CAPSULE ORAL EVERY EVENING
Status: DISCONTINUED | OUTPATIENT
Start: 2024-08-13 | End: 2024-08-16 | Stop reason: HOSPADM

## 2024-08-13 RX ORDER — SODIUM CHLORIDE 9 MG/ML
INJECTION, SOLUTION INTRAVENOUS PRN
Status: DISCONTINUED | OUTPATIENT
Start: 2024-08-13 | End: 2024-08-16 | Stop reason: HOSPADM

## 2024-08-13 RX ORDER — ATORVASTATIN CALCIUM 40 MG/1
40 TABLET, FILM COATED ORAL DAILY
Status: DISCONTINUED | OUTPATIENT
Start: 2024-08-13 | End: 2024-08-16 | Stop reason: HOSPADM

## 2024-08-13 RX ORDER — ONDANSETRON 2 MG/ML
4 INJECTION INTRAMUSCULAR; INTRAVENOUS EVERY 6 HOURS PRN
Status: DISCONTINUED | OUTPATIENT
Start: 2024-08-13 | End: 2024-08-16 | Stop reason: HOSPADM

## 2024-08-13 RX ORDER — INSULIN LISPRO 100 [IU]/ML
0-4 INJECTION, SOLUTION INTRAVENOUS; SUBCUTANEOUS NIGHTLY
Status: DISCONTINUED | OUTPATIENT
Start: 2024-08-13 | End: 2024-08-16 | Stop reason: HOSPADM

## 2024-08-13 RX ORDER — ONDANSETRON 4 MG/1
4 TABLET, ORALLY DISINTEGRATING ORAL EVERY 8 HOURS PRN
Status: DISCONTINUED | OUTPATIENT
Start: 2024-08-13 | End: 2024-08-16 | Stop reason: HOSPADM

## 2024-08-13 RX ORDER — HYDROXYCHLOROQUINE SULFATE 200 MG/1
200 TABLET, FILM COATED ORAL DAILY
Status: DISCONTINUED | OUTPATIENT
Start: 2024-08-13 | End: 2024-08-16 | Stop reason: HOSPADM

## 2024-08-13 RX ORDER — GLUCAGON 1 MG
1 KIT INJECTION PRN
Status: DISCONTINUED | OUTPATIENT
Start: 2024-08-13 | End: 2024-08-13 | Stop reason: SDUPTHER

## 2024-08-13 RX ORDER — POLYETHYLENE GLYCOL 3350 17 G/17G
17 POWDER, FOR SOLUTION ORAL DAILY PRN
Status: DISCONTINUED | OUTPATIENT
Start: 2024-08-13 | End: 2024-08-16 | Stop reason: HOSPADM

## 2024-08-13 RX ORDER — SODIUM CHLORIDE 0.9 % (FLUSH) 0.9 %
5-40 SYRINGE (ML) INJECTION PRN
Status: DISCONTINUED | OUTPATIENT
Start: 2024-08-13 | End: 2024-08-16 | Stop reason: HOSPADM

## 2024-08-13 RX ORDER — MAGNESIUM SULFATE IN WATER 40 MG/ML
2000 INJECTION, SOLUTION INTRAVENOUS PRN
Status: DISCONTINUED | OUTPATIENT
Start: 2024-08-13 | End: 2024-08-16 | Stop reason: HOSPADM

## 2024-08-13 RX ORDER — VENLAFAXINE HYDROCHLORIDE 37.5 MG/1
37.5 CAPSULE, EXTENDED RELEASE ORAL DAILY
Status: DISCONTINUED | OUTPATIENT
Start: 2024-08-13 | End: 2024-08-16 | Stop reason: HOSPADM

## 2024-08-13 RX ORDER — MORPHINE SULFATE 2 MG/ML
2 INJECTION, SOLUTION INTRAMUSCULAR; INTRAVENOUS EVERY 4 HOURS PRN
Status: DISCONTINUED | OUTPATIENT
Start: 2024-08-13 | End: 2024-08-13

## 2024-08-13 RX ORDER — ENOXAPARIN SODIUM 100 MG/ML
40 INJECTION SUBCUTANEOUS DAILY
Status: DISCONTINUED | OUTPATIENT
Start: 2024-08-13 | End: 2024-08-16 | Stop reason: HOSPADM

## 2024-08-13 RX ORDER — HYDROMORPHONE HYDROCHLORIDE 1 MG/ML
1 INJECTION, SOLUTION INTRAMUSCULAR; INTRAVENOUS; SUBCUTANEOUS ONCE
Status: COMPLETED | OUTPATIENT
Start: 2024-08-13 | End: 2024-08-13

## 2024-08-13 RX ORDER — INSULIN LISPRO 100 [IU]/ML
0-8 INJECTION, SOLUTION INTRAVENOUS; SUBCUTANEOUS
Status: DISCONTINUED | OUTPATIENT
Start: 2024-08-13 | End: 2024-08-16 | Stop reason: HOSPADM

## 2024-08-13 RX ORDER — SODIUM CHLORIDE 0.9 % (FLUSH) 0.9 %
5-40 SYRINGE (ML) INJECTION EVERY 12 HOURS SCHEDULED
Status: DISCONTINUED | OUTPATIENT
Start: 2024-08-13 | End: 2024-08-16 | Stop reason: HOSPADM

## 2024-08-13 RX ORDER — SODIUM CHLORIDE, SODIUM LACTATE, POTASSIUM CHLORIDE, CALCIUM CHLORIDE 600; 310; 30; 20 MG/100ML; MG/100ML; MG/100ML; MG/100ML
INJECTION, SOLUTION INTRAVENOUS CONTINUOUS
Status: DISPENSED | OUTPATIENT
Start: 2024-08-13 | End: 2024-08-15

## 2024-08-13 RX ORDER — POTASSIUM CHLORIDE 7.45 MG/ML
10 INJECTION INTRAVENOUS PRN
Status: DISCONTINUED | OUTPATIENT
Start: 2024-08-13 | End: 2024-08-16 | Stop reason: HOSPADM

## 2024-08-13 RX ORDER — ASPIRIN 81 MG/1
81 TABLET, CHEWABLE ORAL DAILY
Status: DISCONTINUED | OUTPATIENT
Start: 2024-08-13 | End: 2024-08-16 | Stop reason: HOSPADM

## 2024-08-13 RX ORDER — FLUTICASONE PROPIONATE 50 MCG
1 SPRAY, SUSPENSION (ML) NASAL DAILY
Status: DISCONTINUED | OUTPATIENT
Start: 2024-08-13 | End: 2024-08-16 | Stop reason: HOSPADM

## 2024-08-13 RX ORDER — DEXTROSE MONOHYDRATE 100 MG/ML
INJECTION, SOLUTION INTRAVENOUS CONTINUOUS PRN
Status: DISCONTINUED | OUTPATIENT
Start: 2024-08-13 | End: 2024-08-16 | Stop reason: HOSPADM

## 2024-08-13 RX ORDER — ACETAMINOPHEN 650 MG/1
650 SUPPOSITORY RECTAL EVERY 6 HOURS PRN
Status: DISCONTINUED | OUTPATIENT
Start: 2024-08-13 | End: 2024-08-16 | Stop reason: HOSPADM

## 2024-08-13 RX ORDER — INSULIN GLARGINE 100 [IU]/ML
5 INJECTION, SOLUTION SUBCUTANEOUS NIGHTLY
Status: DISCONTINUED | OUTPATIENT
Start: 2024-08-13 | End: 2024-08-16 | Stop reason: HOSPADM

## 2024-08-13 RX ORDER — OXYCODONE HYDROCHLORIDE 5 MG/1
5 TABLET ORAL EVERY 4 HOURS PRN
Status: DISCONTINUED | OUTPATIENT
Start: 2024-08-13 | End: 2024-08-16 | Stop reason: HOSPADM

## 2024-08-13 RX ORDER — ACETAMINOPHEN 325 MG/1
650 TABLET ORAL EVERY 6 HOURS PRN
Status: DISCONTINUED | OUTPATIENT
Start: 2024-08-13 | End: 2024-08-16 | Stop reason: HOSPADM

## 2024-08-13 RX ORDER — HYDROMORPHONE HYDROCHLORIDE 1 MG/ML
1 INJECTION, SOLUTION INTRAMUSCULAR; INTRAVENOUS; SUBCUTANEOUS EVERY 4 HOURS PRN
Status: DISCONTINUED | OUTPATIENT
Start: 2024-08-13 | End: 2024-08-14

## 2024-08-13 RX ORDER — ALBUTEROL SULFATE 2.5 MG/3ML
2.5 SOLUTION RESPIRATORY (INHALATION) EVERY 6 HOURS PRN
Status: DISCONTINUED | OUTPATIENT
Start: 2024-08-13 | End: 2024-08-16 | Stop reason: HOSPADM

## 2024-08-13 RX ORDER — ASPIRIN 81 MG/1
81 TABLET, CHEWABLE ORAL DAILY
COMMUNITY

## 2024-08-13 RX ORDER — ERGOCALCIFEROL 1.25 MG/1
50000 CAPSULE ORAL WEEKLY
Status: DISCONTINUED | OUTPATIENT
Start: 2024-08-13 | End: 2024-08-16 | Stop reason: HOSPADM

## 2024-08-13 RX ORDER — AMLODIPINE BESYLATE 10 MG/1
10 TABLET ORAL DAILY
Status: DISCONTINUED | OUTPATIENT
Start: 2024-08-13 | End: 2024-08-16 | Stop reason: HOSPADM

## 2024-08-13 RX ORDER — DEXTROSE MONOHYDRATE 100 MG/ML
INJECTION, SOLUTION INTRAVENOUS CONTINUOUS PRN
Status: DISCONTINUED | OUTPATIENT
Start: 2024-08-13 | End: 2024-08-13 | Stop reason: SDUPTHER

## 2024-08-13 RX ORDER — GLUCAGON 1 MG
1 KIT INJECTION PRN
Status: DISCONTINUED | OUTPATIENT
Start: 2024-08-13 | End: 2024-08-16 | Stop reason: HOSPADM

## 2024-08-13 RX ORDER — MORPHINE SULFATE 4 MG/ML
4 INJECTION, SOLUTION INTRAMUSCULAR; INTRAVENOUS EVERY 4 HOURS PRN
Status: DISCONTINUED | OUTPATIENT
Start: 2024-08-13 | End: 2024-08-14

## 2024-08-13 RX ADMIN — HYDROMORPHONE HYDROCHLORIDE 1 MG: 1 INJECTION, SOLUTION INTRAMUSCULAR; INTRAVENOUS; SUBCUTANEOUS at 22:35

## 2024-08-13 RX ADMIN — SODIUM CHLORIDE, PRESERVATIVE FREE 10 ML: 5 INJECTION INTRAVENOUS at 21:21

## 2024-08-13 RX ADMIN — SODIUM CHLORIDE, POTASSIUM CHLORIDE, SODIUM LACTATE AND CALCIUM CHLORIDE: 600; 310; 30; 20 INJECTION, SOLUTION INTRAVENOUS at 16:13

## 2024-08-13 RX ADMIN — SODIUM CHLORIDE, PRESERVATIVE FREE 10 ML: 5 INJECTION INTRAVENOUS at 12:09

## 2024-08-13 RX ADMIN — ONDANSETRON 4 MG: 2 INJECTION INTRAMUSCULAR; INTRAVENOUS at 11:48

## 2024-08-13 RX ADMIN — MORPHINE SULFATE 2 MG: 2 INJECTION, SOLUTION INTRAMUSCULAR; INTRAVENOUS at 11:46

## 2024-08-13 RX ADMIN — ENOXAPARIN SODIUM 40 MG: 100 INJECTION SUBCUTANEOUS at 18:27

## 2024-08-13 RX ADMIN — INSULIN GLARGINE 5 UNITS: 100 INJECTION, SOLUTION SUBCUTANEOUS at 21:21

## 2024-08-13 RX ADMIN — MORPHINE SULFATE 2 MG: 2 INJECTION, SOLUTION INTRAMUSCULAR; INTRAVENOUS at 15:52

## 2024-08-13 RX ADMIN — HYDROMORPHONE HYDROCHLORIDE 1 MG: 1 INJECTION, SOLUTION INTRAMUSCULAR; INTRAVENOUS; SUBCUTANEOUS at 18:49

## 2024-08-13 RX ADMIN — ONDANSETRON 4 MG: 2 INJECTION INTRAMUSCULAR; INTRAVENOUS at 22:37

## 2024-08-13 ASSESSMENT — PAIN DESCRIPTION - ORIENTATION
ORIENTATION: RIGHT
ORIENTATION: LOWER
ORIENTATION: RIGHT

## 2024-08-13 ASSESSMENT — PAIN DESCRIPTION - ONSET
ONSET: ON-GOING

## 2024-08-13 ASSESSMENT — PAIN - FUNCTIONAL ASSESSMENT
PAIN_FUNCTIONAL_ASSESSMENT: PREVENTS OR INTERFERES SOME ACTIVE ACTIVITIES AND ADLS
PAIN_FUNCTIONAL_ASSESSMENT: PREVENTS OR INTERFERES SOME ACTIVE ACTIVITIES AND ADLS
PAIN_FUNCTIONAL_ASSESSMENT: ACTIVITIES ARE NOT PREVENTED

## 2024-08-13 ASSESSMENT — PAIN SCALES - GENERAL
PAINLEVEL_OUTOF10: 9
PAINLEVEL_OUTOF10: 4
PAINLEVEL_OUTOF10: 8
PAINLEVEL_OUTOF10: 10
PAINLEVEL_OUTOF10: 10
PAINLEVEL_OUTOF10: 4

## 2024-08-13 ASSESSMENT — PAIN DESCRIPTION - DESCRIPTORS
DESCRIPTORS: ACHING;STABBING
DESCRIPTORS: SHARP;CRAMPING
DESCRIPTORS: ACHING;STABBING
DESCRIPTORS: STABBING;CRAMPING
DESCRIPTORS: CRAMPING;SHARP
DESCRIPTORS: ACHING;STABBING

## 2024-08-13 ASSESSMENT — PAIN DESCRIPTION - LOCATION
LOCATION: ABDOMEN

## 2024-08-13 ASSESSMENT — PAIN DESCRIPTION - FREQUENCY
FREQUENCY: INTERMITTENT

## 2024-08-13 ASSESSMENT — PAIN DESCRIPTION - PAIN TYPE
TYPE: ACUTE PAIN

## 2024-08-13 NOTE — H&P
seizures, no numbness, no tingling or weakness.     Physical Exam:     Physical Exam:  BP (!) 142/89   Pulse 87   Temp 98 °F (36.7 °C) (Oral)   Resp 20   SpO2 92%         Temp (24hrs), Av °F (36.7 °C), Min:98 °F (36.7 °C), Max:98 °F (36.7 °C)    No intake/output data recorded.   No intake/output data recorded.    General:  Alert, cooperative, no distress, appears stated age.              Head: Normocephalic, without obvious abnormality, atraumatic.   Eyes:  Conjunctivae/corneas clear. PERRL, EOMs intact.   Nose: Nares normal. No drainage or sinus tenderness.   Neck: Supple, symmetrical, trachea midline, no adenopathy, thyroid: no enlargement, no carotid bruit and no JVD.   Lungs:   Clear to auscultation bilaterally.   Heart:  Regular rate and rhythm, S1, S2 normal.     Abdomen: Abdominal bloating, tenderness and guarding more so to right lower quadrant.  No rebound tenderness   Extremities: Extremities normal, atraumatic, no cyanosis or edema.   Pulses: 2+ and symmetric all extremities.   Skin:  No rashes or lesions   Neurologic: AAOx3, No focal motor or sensory deficit.       Labs Reviewed:    CT and EKG    Procedures/imaging: see electronic medical records for all procedures/Xrays and details which were not copied into this note but were reviewed prior to creation of Plan    Assessment/Plan   Small bowel obstruction  Hypertension  Type 2 diabetes and hyperglycemia  History of rheumatoid arthritis on hydroxychloroquine  History of CVA on dual antiplatelet therapy  Recurrent major depressive disorder  Chronic back pain on hydrocodone outpatient    Admit to 5 S.  Cardiac monitoring  IV fluids for now while patient is n.p.o.  Make patient n.p.o.  NG tube in place and set to intermittent suction  General Surgery consulted  Consider small bowel follow-through  Small bowel obstruction likely related to the adhesions and prior surgeries.  Restart amlodipine  Restart bisoprolol-HCTZ in the next day.  Continue  Lipitor  POCT glucose checks every 6 hours while NPO, medium dose sliding scale  Check A1c  Continue hydroxychloroquine  Continue aspirin and hold Plavix, if no surgery indicated can restart Plavix.  PT/OT  Judicious pain control with oxycodone and morphine  Continue Effexor from recurrent major depressive disorder       DVT/GI Prophylaxis: Lovenox    Discussed with patient and  at bedside about hospital admission and my plan care, both understood and agree with my plan care.    Edward Talavera,   8/13/2024 11:06 AM    Disclaimer: Sections of this note are dictated using utilizing voice recognition software. Minor typographical errors may be present. If questions arise, please do not hesitate to contact me or call our department.

## 2024-08-13 NOTE — CONSULTS
General Surgery Consult      Linda Espinal  Admit date: 2024    MRN: [id number]     : 1960     Age: 64 y.o.        Attending Physician: Doc David MD, FACS      Subjective:     Linda Espinal is a 64 y.o. female who is very well-known to me who has been transferred to our institution for management of a small bowel obstruction.  The patient has multiple comorbidities and that she had multiple surgeries before I met her in  which is 8 years ago where I did try to perform a laparoscopic loss of adhesion but I aborted because of the significance of the adhesions.   she came with bowel obstruction that did not resolve so I had to take her for surgery and we did a small bowel resection.  She has been seen multiple times for recurrent bowel obstruction but all of them resolved and I did not see the patient for more than 5 years now.  She stated that she has been doing very well however she went on vacation to [place] and she started having some abdominal pain for which she had 2 CT scan over the last 2-month including 1 in [place] that was normal.   When I saw the patient that she was in her room and she stated that she feels better.  She has an NG tube and there is bile in the canister and her vitals shows no fever or tachycardia and her WBC is normal.  She stated that her abdominal pain has improved as well as her distention.    Patient Active Problem List    Diagnosis Date Noted    Recurrent major depressive disorder, in partial remission (HCC) 2023    Abdominal pain 10/21/2020    Generalized abdominal pain     Small bowel obstruction (HCC) 2020    Anemia 08/10/2019    S/P small bowel resection 2019    Chronic pelvic pain in female 2019    Adnexal cyst 2019    Bowel obstruction (HCC) 2018    Non-intractable cyclical vomiting with nausea 2018    Type 2 diabetes mellitus with nephropathy (HCC) 2018    Post-operative pain  03/14/2017    Chronic abdominal pain 03/14/2017    SBO (small bowel obstruction) (HCC) 02/21/2017    GERD (gastroesophageal reflux disease) 06/27/2016    Osteoarthritis of left knee 06/27/2016    Asthma 06/27/2016    Type 2 diabetes mellitus (HCC) 06/27/2016    Hyperlipidemia 06/27/2016    Sural neuritis 06/23/2014    Chest pain 04/20/2014    Essential hypertension, benign 02/27/2012     Past Medical History:   Diagnosis Date    Abdominal adhesions     Adnexal cyst 7/30/2019    Left    Anemia     Asthma     Chronic pelvic pain in female 7/30/2019    Diabetes mellitus (HCC)     Dyskinesia     bilateral    Essential hypertension     GERD (gastroesophageal reflux disease)     Hypertension     Menopause     Microhematuria     Rheumatoid arteritis (HCC) 2018    Stool color black       Past Surgical History:   Procedure Laterality Date    CHOLECYSTECTOMY  6/28/10    COLONOSCOPY N/A 1/21/2019    COLONOSCOPY performed by Evelyne Mcnamara MD at [facility] [place] [place] ENDOSCOPY    COLONOSCOPY      HERNIA REPAIR      HYSTERECTOMY (CERVIX STATUS UNKNOWN)  1989    Fibroids    ORTHOPEDIC SURGERY Right     ankle- multiple surgeries    OVARY REMOVAL  12/2000    SMALL INTESTINE SURGERY  12/2000    SMALL INTESTINE SURGERY  02/21/2017    Dr. David    TISSUE LOCALIZATION-EXCISION      TOTAL KNEE ARTHROPLASTY Right 06/2018    TOTAL KNEE ARTHROPLASTY Left     UPPER GASTROINTESTINAL ENDOSCOPY        Social History     Tobacco Use    Smoking status: Never    Smokeless tobacco: Never   Substance Use Topics    Alcohol use: No      Social History     Tobacco Use   Smoking Status Never   Smokeless Tobacco Never     Family History   Problem Relation Age of Onset    Diabetes Brother     Kidney Disease Brother     Diabetes Other         parent    Hypertension Other         sibling    Diabetes Mother     Lung Disease Father     Diabetes Father     Heart Disease Father     Breast Cancer Other 20    Hypertension Other         parent    Lung Disease Brother

## 2024-08-14 ENCOUNTER — APPOINTMENT (OUTPATIENT)
Facility: HOSPITAL | Age: 64
End: 2024-08-14
Attending: HOSPITALIST
Payer: COMMERCIAL

## 2024-08-14 LAB
BASOPHILS # BLD: 0 K/UL (ref 0–0.1)
BASOPHILS NFR BLD: 0 % (ref 0–2)
DIFFERENTIAL METHOD BLD: ABNORMAL
EOSINOPHIL # BLD: 0.1 K/UL (ref 0–0.4)
EOSINOPHIL NFR BLD: 1 % (ref 0–5)
ERYTHROCYTE [DISTWIDTH] IN BLOOD BY AUTOMATED COUNT: 13.1 % (ref 11.6–14.5)
GLUCOSE BLD STRIP.AUTO-MCNC: 186 MG/DL (ref 70–110)
GLUCOSE BLD STRIP.AUTO-MCNC: 189 MG/DL (ref 70–110)
GLUCOSE BLD STRIP.AUTO-MCNC: 197 MG/DL (ref 70–110)
GLUCOSE BLD STRIP.AUTO-MCNC: 222 MG/DL (ref 70–110)
HCT VFR BLD AUTO: 43.2 % (ref 35–45)
HGB BLD-MCNC: 14.3 G/DL (ref 12–16)
IMM GRANULOCYTES # BLD AUTO: 0 K/UL (ref 0–0.04)
IMM GRANULOCYTES NFR BLD AUTO: 0 % (ref 0–0.5)
LYMPHOCYTES # BLD: 1.5 K/UL (ref 0.9–3.6)
LYMPHOCYTES NFR BLD: 15 % (ref 21–52)
MCH RBC QN AUTO: 29.5 PG (ref 24–34)
MCHC RBC AUTO-ENTMCNC: 33.1 G/DL (ref 31–37)
MCV RBC AUTO: 89.1 FL (ref 78–100)
MONOCYTES # BLD: 1 K/UL (ref 0.05–1.2)
MONOCYTES NFR BLD: 10 % (ref 3–10)
NEUTS SEG # BLD: 7.3 K/UL (ref 1.8–8)
NEUTS SEG NFR BLD: 74 % (ref 40–73)
NRBC # BLD: 0 K/UL (ref 0–0.01)
NRBC BLD-RTO: 0 PER 100 WBC
PLATELET # BLD AUTO: 217 K/UL (ref 135–420)
PMV BLD AUTO: 11.2 FL (ref 9.2–11.8)
RBC # BLD AUTO: 4.85 M/UL (ref 4.2–5.3)
WBC # BLD AUTO: 9.9 K/UL (ref 4.6–13.2)

## 2024-08-14 PROCEDURE — 99233 SBSQ HOSP IP/OBS HIGH 50: CPT | Performed by: SURGERY

## 2024-08-14 PROCEDURE — 97535 SELF CARE MNGMENT TRAINING: CPT

## 2024-08-14 PROCEDURE — 82962 GLUCOSE BLOOD TEST: CPT

## 2024-08-14 PROCEDURE — 97165 OT EVAL LOW COMPLEX 30 MIN: CPT

## 2024-08-14 PROCEDURE — 1100000000 HC RM PRIVATE

## 2024-08-14 PROCEDURE — 74250 X-RAY XM SM INT 1CNTRST STD: CPT

## 2024-08-14 PROCEDURE — 97161 PT EVAL LOW COMPLEX 20 MIN: CPT

## 2024-08-14 PROCEDURE — 36415 COLL VENOUS BLD VENIPUNCTURE: CPT

## 2024-08-14 PROCEDURE — 6370000000 HC RX 637 (ALT 250 FOR IP): Performed by: INTERNAL MEDICINE

## 2024-08-14 PROCEDURE — 2580000003 HC RX 258: Performed by: INTERNAL MEDICINE

## 2024-08-14 PROCEDURE — 6360000004 HC RX CONTRAST MEDICATION: Performed by: SURGERY

## 2024-08-14 PROCEDURE — 6360000002 HC RX W HCPCS: Performed by: SURGERY

## 2024-08-14 PROCEDURE — 99232 SBSQ HOSP IP/OBS MODERATE 35: CPT | Performed by: INTERNAL MEDICINE

## 2024-08-14 PROCEDURE — 6360000002 HC RX W HCPCS: Performed by: INTERNAL MEDICINE

## 2024-08-14 PROCEDURE — 6370000000 HC RX 637 (ALT 250 FOR IP): Performed by: HOSPITALIST

## 2024-08-14 PROCEDURE — 2580000003 HC RX 258: Performed by: SURGERY

## 2024-08-14 PROCEDURE — 85025 COMPLETE CBC W/AUTO DIFF WBC: CPT

## 2024-08-14 PROCEDURE — 94761 N-INVAS EAR/PLS OXIMETRY MLT: CPT

## 2024-08-14 RX ORDER — DIPHENHYDRAMINE HYDROCHLORIDE 50 MG/ML
25 INJECTION INTRAMUSCULAR; INTRAVENOUS EVERY 6 HOURS PRN
Status: DISCONTINUED | OUTPATIENT
Start: 2024-08-14 | End: 2024-08-16 | Stop reason: HOSPADM

## 2024-08-14 RX ORDER — DIPHENHYDRAMINE HYDROCHLORIDE 50 MG/ML
12.5 INJECTION INTRAMUSCULAR; INTRAVENOUS EVERY 6 HOURS PRN
Status: DISCONTINUED | OUTPATIENT
Start: 2024-08-14 | End: 2024-08-14

## 2024-08-14 RX ORDER — DIPHENHYDRAMINE HCL 25 MG
25 CAPSULE ORAL EVERY 6 HOURS PRN
Status: DISCONTINUED | OUTPATIENT
Start: 2024-08-14 | End: 2024-08-16 | Stop reason: HOSPADM

## 2024-08-14 RX ADMIN — OXYCODONE HYDROCHLORIDE 5 MG: 5 TABLET ORAL at 13:05

## 2024-08-14 RX ADMIN — VENLAFAXINE HYDROCHLORIDE 37.5 MG: 37.5 CAPSULE, EXTENDED RELEASE ORAL at 13:07

## 2024-08-14 RX ADMIN — INSULIN GLARGINE 5 UNITS: 100 INJECTION, SOLUTION SUBCUTANEOUS at 21:30

## 2024-08-14 RX ADMIN — DOXEPIN HYDROCHLORIDE 50 MG: 50 CAPSULE ORAL at 17:38

## 2024-08-14 RX ADMIN — ASPIRIN 81 MG CHEWABLE TABLET 81 MG: 81 TABLET CHEWABLE at 13:06

## 2024-08-14 RX ADMIN — METOPROLOL TARTRATE 12.5 MG: 25 TABLET, FILM COATED ORAL at 13:06

## 2024-08-14 RX ADMIN — ATORVASTATIN CALCIUM 40 MG: 40 TABLET, FILM COATED ORAL at 13:05

## 2024-08-14 RX ADMIN — DIPHENHYDRAMINE HYDROCHLORIDE 25 MG: 50 INJECTION INTRAMUSCULAR; INTRAVENOUS at 11:10

## 2024-08-14 RX ADMIN — OXYCODONE HYDROCHLORIDE 5 MG: 5 TABLET ORAL at 21:29

## 2024-08-14 RX ADMIN — DIATRIZOATE MEGLUMINE AND DIATRIZOATE SODIUM 240 ML: 660; 100 LIQUID ORAL; RECTAL at 09:20

## 2024-08-14 RX ADMIN — DIPHENHYDRAMINE HYDROCHLORIDE 25 MG: 25 CAPSULE ORAL at 17:38

## 2024-08-14 RX ADMIN — INSULIN LISPRO 2 UNITS: 100 INJECTION, SOLUTION INTRAVENOUS; SUBCUTANEOUS at 11:59

## 2024-08-14 RX ADMIN — SODIUM CHLORIDE, POTASSIUM CHLORIDE, SODIUM LACTATE AND CALCIUM CHLORIDE: 600; 310; 30; 20 INJECTION, SOLUTION INTRAVENOUS at 07:13

## 2024-08-14 RX ADMIN — SODIUM CHLORIDE, POTASSIUM CHLORIDE, SODIUM LACTATE AND CALCIUM CHLORIDE: 600; 310; 30; 20 INJECTION, SOLUTION INTRAVENOUS at 21:37

## 2024-08-14 RX ADMIN — METOPROLOL TARTRATE 12.5 MG: 25 TABLET, FILM COATED ORAL at 21:29

## 2024-08-14 RX ADMIN — SODIUM CHLORIDE, PRESERVATIVE FREE 10 ML: 5 INJECTION INTRAVENOUS at 21:30

## 2024-08-14 RX ADMIN — ENOXAPARIN SODIUM 40 MG: 100 INJECTION SUBCUTANEOUS at 12:00

## 2024-08-14 RX ADMIN — DIPHENHYDRAMINE HYDROCHLORIDE 25 MG: 25 CAPSULE ORAL at 06:53

## 2024-08-14 RX ADMIN — HYDROXYCHLOROQUINE SULFATE 200 MG: 200 TABLET ORAL at 13:06

## 2024-08-14 RX ADMIN — HYDROMORPHONE HYDROCHLORIDE 1 MG: 1 INJECTION, SOLUTION INTRAMUSCULAR; INTRAVENOUS; SUBCUTANEOUS at 02:59

## 2024-08-14 RX ADMIN — OXYCODONE HYDROCHLORIDE 5 MG: 5 TABLET ORAL at 08:17

## 2024-08-14 RX ADMIN — AMLODIPINE BESYLATE 10 MG: 10 TABLET ORAL at 13:06

## 2024-08-14 ASSESSMENT — PAIN - FUNCTIONAL ASSESSMENT
PAIN_FUNCTIONAL_ASSESSMENT: PREVENTS OR INTERFERES SOME ACTIVE ACTIVITIES AND ADLS
PAIN_FUNCTIONAL_ASSESSMENT: ACTIVITIES ARE NOT PREVENTED
PAIN_FUNCTIONAL_ASSESSMENT: PREVENTS OR INTERFERES SOME ACTIVE ACTIVITIES AND ADLS
PAIN_FUNCTIONAL_ASSESSMENT: ACTIVITIES ARE NOT PREVENTED

## 2024-08-14 ASSESSMENT — PAIN DESCRIPTION - DESCRIPTORS
DESCRIPTORS: ACHING;DISCOMFORT
DESCRIPTORS: ACHING;STABBING
DESCRIPTORS: BURNING;CRUSHING
DESCRIPTORS: ACHING;STABBING
DESCRIPTORS: ACHING;DISCOMFORT

## 2024-08-14 ASSESSMENT — PAIN DESCRIPTION - ONSET
ONSET: GRADUAL
ONSET: PROGRESSIVE
ONSET: ON-GOING
ONSET: ON-GOING

## 2024-08-14 ASSESSMENT — PAIN DESCRIPTION - ORIENTATION
ORIENTATION: ANTERIOR
ORIENTATION: RIGHT;LEFT
ORIENTATION: RIGHT

## 2024-08-14 ASSESSMENT — PAIN DESCRIPTION - LOCATION
LOCATION: ABDOMEN

## 2024-08-14 ASSESSMENT — PAIN DESCRIPTION - PAIN TYPE
TYPE: ACUTE PAIN

## 2024-08-14 ASSESSMENT — PAIN DESCRIPTION - FREQUENCY
FREQUENCY: INTERMITTENT
FREQUENCY: CONTINUOUS

## 2024-08-14 ASSESSMENT — PAIN SCALES - GENERAL
PAINLEVEL_OUTOF10: 7
PAINLEVEL_OUTOF10: 5
PAINLEVEL_OUTOF10: 10
PAINLEVEL_OUTOF10: 4
PAINLEVEL_OUTOF10: 9
PAINLEVEL_OUTOF10: 8
PAINLEVEL_OUTOF10: 4
PAINLEVEL_OUTOF10: 4

## 2024-08-14 NOTE — PROGRESS NOTES
Physical Therapy  PHYSICAL THERAPY EVALUATION/DISCHARGE    Patient: Linda Espinal (64 y.o. female)  Date: 8/14/2024  Primary Diagnosis: SBO (small bowel obstruction) ([facility]) [K56.609]       Precautions: General Precautions,  ,  ,  ,  ,  ,  ,    PLOF: Lives with spouse in a 1 story home and 3 MOHINDER and bilateral railings. Ind prior to admission.     ASSESSMENT AND RECOMMENDATIONS:  Pt received at EOB with family members present, pt agreeable to PT evaluation. Demonstrates good seated balance at EOB and completes MMT for BLE 4+/5 for knee extension, hip flexion, ankle Df/PF with intact sensation. Sit to stand supervision and ambulates within room without AD and manages IV pole with intermittent cues for safety. Returns to EOB presents with no deficits that require skilled acute care PT at this time, will sign off.     Patient does not require further skilled physical therapy intervention at this level of care.    Further Equipment Recommendations for Discharge:  n/a    [facility]: AM-[facility] Inpatient Mobility Raw Score : 23      At this time and based on an -PAC score, no further PT is recommended upon discharge.  Recommend patient returns to prior setting with prior services.    This [facility] score should be considered in conjunction with interdisciplinary team recommendations to determine the most appropriate discharge setting. Patient's social support, diagnosis, medical stability, and prior level of function should also be taken into consideration.     SUBJECTIVE:   Patient stated “Can you just leave it unplugged?” *referring to IV pole*    OBJECTIVE DATA SUMMARY:     Past Medical History:   Diagnosis Date    Abdominal adhesions     Adnexal cyst 7/30/2019    Left    Anemia     Asthma     Chronic pelvic pain in female 7/30/2019    Diabetes mellitus (HCC)     Dyskinesia     bilateral    Essential hypertension     GERD (gastroesophageal reflux disease)     Hypertension     Menopause     Microhematuria     Rheumatoid

## 2024-08-14 NOTE — FLOWSHEET NOTE
08/13/24 1932   Vital Signs   Temp 98.2 °F (36.8 °C)   Temp Source Oral   Pulse 91   Heart Rate Source Monitor   Respirations 18   /78   MAP (Calculated) 97   BP Location Left upper arm   BP Method Automatic   Patient Position Semi fowlers   Oxygen Therapy   SpO2 94 %     Bedside shift report with Shawna RN, patient is alert and oriented x4. Patient is on NG tube draining, iv fluid infusing. Call bell and bed side table kept within reach.

## 2024-08-14 NOTE — PLAN OF CARE
Problem: Discharge Planning  Goal: Discharge to home or other facility with appropriate resources  8/13/2024 2030 by Kelechi Gallagher RN  Outcome: Progressing  8/13/2024 2029 by Kelechi Gallagher RN  Outcome: Progressing  Flowsheets  Taken 8/13/2024 2019 by Kelechi Gallagher RN  Discharge to home or other facility with appropriate resources: Identify barriers to discharge with patient and caregiver  Taken 8/13/2024 1408 by Shawna Clay RN  Discharge to home or other facility with appropriate resources: Identify barriers to discharge with patient and caregiver  Taken 8/13/2024 1100 by Shawna Clay RN  Discharge to home or other facility with appropriate resources: Identify barriers to discharge with patient and caregiver     Problem: Pain  Goal: Verbalizes/displays adequate comfort level or baseline comfort level  8/13/2024 2030 by Kelechi Gallagher RN  Outcome: Progressing  8/13/2024 2030 by Kelechi Gallagher RN  Outcome: Progressing     Problem: Gastrointestinal - Adult  Goal: Minimal or absence of nausea and vomiting  Outcome: Progressing  Goal: Maintains adequate nutritional intake  Outcome: Progressing

## 2024-08-14 NOTE — PLAN OF CARE
Problem: Pain  Goal: Verbalizes/displays adequate comfort level or baseline comfort level  Outcome: Progressing  Flowsheets (Taken 8/14/2024 1722)  Verbalizes/displays adequate comfort level or baseline comfort level: Encourage patient to monitor pain and request assistance     Problem: Gastrointestinal - Adult  Goal: Minimal or absence of nausea and vomiting  Outcome: Progressing  Flowsheets (Taken 8/14/2024 1722)  Minimal or absence of nausea and vomiting: Administer IV fluids as ordered to ensure adequate hydration

## 2024-08-14 NOTE — PROGRESS NOTES
General Surgery Consult      Linda Espinal  Admit date: 2024    MRN: [id number]     : 1960     Age: 64 y.o.        Attending Physician: Doc David MD, FACS      Subjective:     Linda Espinal is a 64 y.o. female who we are following for evaluation of small bowel obstruction in the setting of multiple previous abdominal surgeries.  Last night I did get a call that the patient's abdominal pain is not getting better so I added Dilaudid however when I saw her this morning she was having itching.  I added Benadryl as well.  The patient stated that she feels much better and she stated her abdominal pain has improved.  She feels she did pass flatus but she is not sure but she did not have a bowel movement.  Her vitals shows no fever or tachycardia.  Her NG tube output has been about 500 cc.     Patient Active Problem List    Diagnosis Date Noted    Recurrent major depressive disorder, in partial remission (HCC) 2023    Abdominal pain 10/21/2020    Generalized abdominal pain     Small bowel obstruction (HCC) 2020    Anemia 08/10/2019    S/P small bowel resection 2019    Chronic pelvic pain in female 2019    Adnexal cyst 2019    Bowel obstruction (HCC) 2018    Non-intractable cyclical vomiting with nausea 2018    Type 2 diabetes mellitus with nephropathy (HCC) 2018    Post-operative pain 2017    Chronic abdominal pain 2017    SBO (small bowel obstruction) (HCC) 2017    GERD (gastroesophageal reflux disease) 2016    Osteoarthritis of left knee 2016    Asthma 2016    Type 2 diabetes mellitus (HCC) 2016    Hyperlipidemia 2016    Sural neuritis 2014    Chest pain 2014    Essential hypertension, benign 2012     Past Medical History:   Diagnosis Date    Abdominal adhesions     Adnexal cyst 2019    Left    Anemia     Asthma     Chronic pelvic pain in female 2019    Diabetes mellitus

## 2024-08-14 NOTE — PROGRESS NOTES
[facility] Hospitalist Group  Progress Note    Patient: Linda Espinal Age: 64 y.o. : 1960 MR#: [id number] SSN: [id number]  Date/Time: 2024     Subjective:   Patient doing well today.  Continues to have some abdominal pain and tenderness.  Received a small bowel follow-through and had multiple bowel movements afterward.  Per surgery okay to advance diet to clear liquid and remove NG tube`    Review of systems  General: No fevers or chills.  Cardiovascular: No chest pain or pressure. No palpitations.   Pulmonary: No shortness of breath, cough or wheeze.   Gastrointestinal: No abdominal pain, nausea, vomiting or diarrhea.   Genitourinary: No urinary frequency, urgency, hesitancy or dysuria.   Musculoskeletal: No joint or muscle pain, no back pain, no recent trauma.    Neurologic: No headache, numbness, tingling or weakness.   Assessment/Plan:   Small bowel obstruction  Hypertension  Type 2 diabetes and hyperglycemia  History of rheumatoid arthritis on hydroxychloroquine  History of CVA on dual antiplatelet therapy  Recurrent major depressive disorder  Chronic back pain on hydrocodone outpatient    Admit to 5 S.  Cardiac monitoring  General Surgery consulted  Small bowel follow-through completed, multiple bowel movements after  Remove NG tube  Clear liquid diet per surgeon  Small bowel obstruction likely related to the adhesions and prior surgeries.  Restart amlodipine  Restart metoprolol, resume bisoprolol-HCTZ once discharged  Continue Lipitor  POCT glucose checks every ACHS, medium dose sliding scale  Check A1c  Continue hydroxychloroquine  Continue aspirin and hold Plavix, if no surgery indicated can restart Plavix.  PT/OT  Judicious pain control with oxycodone   Continue Effexor from recurrent major depressive disorder      I spent 40 minutes with the patient in face-to-face consultation, of which greater than 50% was spent in counseling and coordination of care as  described above.    Case discussed with:  [x]Patient  []Family  []Nursing  []Case Management  DVT Prophylaxis:  [x]Lovenox  []Hep SQ  []SCDs  []Coumadin   []Eliquis/Xarelto     Objective:   VS: /87   Pulse (!) 110   Temp 98 °F (36.7 °C) (Oral)   Resp 18   Ht 1.626 m (5' 4.02\")   Wt 68.2 kg (150 lb 5.7 oz)   SpO2 96%   BMI 25.80 kg/m²    Tmax/24hrs: Temp (24hrs), Av.4 °F (36.9 °C), Min:97.9 °F (36.6 °C), Max:98.8 °F (37.1 °C)  IOBRIEF  Intake/Output Summary (Last 24 hours) at 2024 1248  Last data filed at 2024 2330  Gross per 24 hour   Intake --   Output 1300 ml   Net -1300 ml       General:  Alert, cooperative, no distress, appears stated age.              Head: Normocephalic, without obvious abnormality, atraumatic.   Eyes:  Conjunctivae/corneas clear. PERRL, EOMs intact.   Nose: Nares normal. No drainage or sinus tenderness.   Neck: Supple, symmetrical, trachea midline, no adenopathy, thyroid: no enlargement, no carotid bruit and no JVD.   Lungs:   Clear to auscultation bilaterally.   Heart:  Regular rate and rhythm, S1, S2 normal.     Abdomen: Abdominal bloating, tenderness and guarding more so to right lower quadrant.  No rebound tenderness   Extremities: Extremities normal, atraumatic, no cyanosis or edema.   Pulses: 2+ and symmetric all extremities.   Skin:  No rashes or lesions   Neurologic: AAOx3, No focal motor or sensory deficit.       Medications:   Current Facility-Administered Medications   Medication Dose Route Frequency    diphenhydrAMINE (BENADRYL) capsule 25 mg  25 mg Oral Q6H PRN    diphenhydrAMINE (BENADRYL) injection 25 mg  25 mg IntraVENous Q6H PRN    diatrizoate meglumine-sodium (GASTROGRAFIN) 66-10 % solution 240 mL  240 mL Per NG tube ONCE PRN    amLODIPine (NORVASC) tablet 10 mg  10 mg Oral Daily    aspirin chewable tablet 81 mg  81 mg Oral Daily    atorvastatin (LIPITOR) tablet 40 mg  40 mg Oral Daily    doxepin (SINEQUAN) capsule 50 mg  50 mg Oral QPM

## 2024-08-14 NOTE — PROGRESS NOTES
Spiritual Health Assessment/Progress Note  [facility]    Spiritual/Emotional Needs,  ,  ,      Name: Linda Espinal MRN: [id number]    Age: 64 y.o.     Sex: female   Language: English   Muslim: Latter day   SBO (small bowel obstruction) (HCC)     Date: 8/14/2024            Total Time Calculated: 5 min              Spiritual Assessment began in [facility] SURGICAL        Referral/Consult From: Rounding   Encounter Overview/Reason: Spiritual/Emotional Needs  Service Provided For: Patient    Jigna, Belief, Meaning:   Patient has beliefs or practices that help with coping during difficult times  Family/Friends No family/friends present      Importance and Influence:  Patient has spiritual/personal beliefs that influence decisions regarding their health  Family/Friends no family/friends present    Community:  Patient feels well-supported. Support system includes: Spouse/Partner and Extended family  Family/Friends no family/friends present    Assessment and Plan of Care:     Patient Interventions include: Affirmed coping skills/support systems  Family/Friends Interventions include: no family/friends present    Patient Plan of Care: No spiritual needs identified for follow-up  Family/Friends Plan of Care: No spiritual needs identified for follow-up    Electronically signed by DONOVAN Mcfarlane on 8/14/2024 at 5:57 PM

## 2024-08-14 NOTE — PROGRESS NOTES
OCCUPATIONAL THERAPY EVALUATION/DISCHARGE    Patient: Linda Espinal (64 y.o. female)  Date: 8/14/2024  Primary Diagnosis: SBO (small bowel obstruction) ([facility]) [K56.609]  Precautions: General Precautions  PLOF: Pt was independent with self-care and functional mobility.        ASSESSMENT AND RECOMMENDATIONS:  Based on the objective data described below, pt presents with no deficits that impede pt function with ADLs, functional transfers, and functional mobility in preparation for selfcare tasks. At this time pt is safe to d/c home with family support from selfcare standpoint. Pt left all needs met and call bell in reach.      Maximum therapeutic gains met at current level of care and patient will be discharged from occupational therapy at this time.    Further Equipment Recommendations for Discharge: Pt has all DME    AMPA: AM-PAC Inpatient Daily Activity Raw Score: 24    At this time and based on an AM-PAC score, no further OT is recommended upon discharge.  Recommend patient returns to prior setting with prior services.    This [facility] score should be considered in conjunction with interdisciplinary team recommendations to determine the most appropriate discharge setting. Patient's social support, diagnosis, medical stability, and prior level of function should also be taken into consideration.     SUBJECTIVE:   Patient stated “Im sorry, I need to use the bathroom again. Do you have spray?”    OBJECTIVE DATA SUMMARY:     Past Medical History:   Diagnosis Date    Abdominal adhesions     Adnexal cyst 7/30/2019    Left    Anemia     Asthma     Chronic pelvic pain in female 7/30/2019    Diabetes mellitus (HCC)     Dyskinesia     bilateral    Essential hypertension     GERD (gastroesophageal reflux disease)     Hypertension     Menopause     Microhematuria     Rheumatoid arteritis (HCC) 2018    Stool color black      Past Surgical History:   Procedure Laterality Date    CHOLECYSTECTOMY  6/28/10    COLONOSCOPY N/A  1/21/2019    COLONOSCOPY performed by Evelyne Mcnamara MD at [facility] [place] ENDOSCOPY    COLONOSCOPY      HERNIA REPAIR      HYSTERECTOMY (CERVIX STATUS UNKNOWN)  1989    Fibroids    ORTHOPEDIC SURGERY Right     ankle- multiple surgeries    OVARY REMOVAL  12/2000    SMALL INTESTINE SURGERY  12/2000    SMALL INTESTINE SURGERY  02/21/2017    Dr. David    TISSUE LOCALIZATION-EXCISION      TOTAL KNEE ARTHROPLASTY Right 06/2018    TOTAL KNEE ARTHROPLASTY Left     UPPER GASTROINTESTINAL ENDOSCOPY         Home Situation:   Social/Functional History  Lives With: Spouse  Type of Home: House  Home Layout: One level  Home Access: Stairs to enter with rails  Entrance Stairs - Number of Steps: 3  Bathroom Shower/Tub: Tub/Shower unit, Walk-in shower  Bathroom Toilet: Bedside commode  Bathroom Equipment: Shower chair  Home Equipment: Cane, Walker - Rolling, Rollator  ADL Assistance: Independent  [x]  Right hand dominant   []  Left hand dominant    Cognitive/Behavioral Status:  Orientation  Overall Orientation Status: Within Normal Limits  Orientation Level: Oriented X4    Skin: Intact  Edema: None noted    Vision/Perceptual:    Vision  Vision: Within Functional Limits       Coordination: BUE  Coordination: Within functional limits    Balance:  Balance  Sitting: Intact  Standing: Intact    Strength: BUE  Strength: Within functional limits    Tone & Sensation: BUE  Tone: Normal  Sensation: Intact    Range of Motion: BUE  AROM: Within functional limits    Functional Mobility and Transfers for ADLs:  Bed Mobility:  Bed Mobility Training  Bed Mobility Training: Yes  Supine to Sit: Modified independent  Sit to Supine: Modified independent  Scooting: Modified independent      Transfers:  Transfer Training  Transfer Training: Yes  Sit to Stand: Modified independent  Stand to Sit: Modified independent  Toilet Transfer: Modified independent (BSC; d/t untimely urge)    ADL Assessment:   Feeding: Independent  Grooming: Independent  UE Bathing:

## 2024-08-15 LAB
ALBUMIN SERPL-MCNC: 2.7 G/DL (ref 3.4–5)
ALBUMIN/GLOB SERPL: 0.9 (ref 0.8–1.7)
ALP SERPL-CCNC: 103 U/L (ref 45–117)
ALT SERPL-CCNC: 18 U/L (ref 13–56)
ANION GAP SERPL CALC-SCNC: 4 MMOL/L (ref 3–18)
AST SERPL-CCNC: 13 U/L (ref 10–38)
BASOPHILS # BLD: 0 K/UL (ref 0–0.1)
BASOPHILS NFR BLD: 0 % (ref 0–2)
BILIRUB SERPL-MCNC: 0.6 MG/DL (ref 0.2–1)
BUN SERPL-MCNC: 9 MG/DL (ref 7–18)
BUN/CREAT SERPL: 12 (ref 12–20)
CALCIUM SERPL-MCNC: 8.8 MG/DL (ref 8.5–10.1)
CHLORIDE SERPL-SCNC: 106 MMOL/L (ref 100–111)
CO2 SERPL-SCNC: 28 MMOL/L (ref 21–32)
CREAT SERPL-MCNC: 0.75 MG/DL (ref 0.6–1.3)
DIFFERENTIAL METHOD BLD: ABNORMAL
EOSINOPHIL # BLD: 0.1 K/UL (ref 0–0.4)
EOSINOPHIL NFR BLD: 1 % (ref 0–5)
ERYTHROCYTE [DISTWIDTH] IN BLOOD BY AUTOMATED COUNT: 13.1 % (ref 11.6–14.5)
GLOBULIN SER CALC-MCNC: 3 G/DL (ref 2–4)
GLUCOSE BLD STRIP.AUTO-MCNC: 108 MG/DL (ref 70–110)
GLUCOSE BLD STRIP.AUTO-MCNC: 159 MG/DL (ref 70–110)
GLUCOSE BLD STRIP.AUTO-MCNC: 267 MG/DL (ref 70–110)
GLUCOSE BLD STRIP.AUTO-MCNC: 302 MG/DL (ref 70–110)
GLUCOSE SERPL-MCNC: 169 MG/DL (ref 74–99)
HCT VFR BLD AUTO: 39.2 % (ref 35–45)
HGB BLD-MCNC: 12.6 G/DL (ref 12–16)
IMM GRANULOCYTES # BLD AUTO: 0 K/UL (ref 0–0.04)
IMM GRANULOCYTES NFR BLD AUTO: 0 % (ref 0–0.5)
LYMPHOCYTES # BLD: 1.5 K/UL (ref 0.9–3.6)
LYMPHOCYTES NFR BLD: 26 % (ref 21–52)
MCH RBC QN AUTO: 29.1 PG (ref 24–34)
MCHC RBC AUTO-ENTMCNC: 32.1 G/DL (ref 31–37)
MCV RBC AUTO: 90.5 FL (ref 78–100)
MONOCYTES # BLD: 0.6 K/UL (ref 0.05–1.2)
MONOCYTES NFR BLD: 11 % (ref 3–10)
NEUTS SEG # BLD: 3.5 K/UL (ref 1.8–8)
NEUTS SEG NFR BLD: 62 % (ref 40–73)
NRBC # BLD: 0 K/UL (ref 0–0.01)
NRBC BLD-RTO: 0 PER 100 WBC
PLATELET # BLD AUTO: 184 K/UL (ref 135–420)
PMV BLD AUTO: 11.3 FL (ref 9.2–11.8)
POTASSIUM SERPL-SCNC: 3.4 MMOL/L (ref 3.5–5.5)
PROT SERPL-MCNC: 5.7 G/DL (ref 6.4–8.2)
RBC # BLD AUTO: 4.33 M/UL (ref 4.2–5.3)
SODIUM SERPL-SCNC: 138 MMOL/L (ref 136–145)
WBC # BLD AUTO: 5.6 K/UL (ref 4.6–13.2)

## 2024-08-15 PROCEDURE — 6370000000 HC RX 637 (ALT 250 FOR IP): Performed by: INTERNAL MEDICINE

## 2024-08-15 PROCEDURE — 94761 N-INVAS EAR/PLS OXIMETRY MLT: CPT

## 2024-08-15 PROCEDURE — 82962 GLUCOSE BLOOD TEST: CPT

## 2024-08-15 PROCEDURE — 6360000002 HC RX W HCPCS: Performed by: SURGERY

## 2024-08-15 PROCEDURE — 2580000003 HC RX 258: Performed by: INTERNAL MEDICINE

## 2024-08-15 PROCEDURE — 99233 SBSQ HOSP IP/OBS HIGH 50: CPT | Performed by: SURGERY

## 2024-08-15 PROCEDURE — 6360000002 HC RX W HCPCS: Performed by: INTERNAL MEDICINE

## 2024-08-15 PROCEDURE — 99232 SBSQ HOSP IP/OBS MODERATE 35: CPT | Performed by: INTERNAL MEDICINE

## 2024-08-15 PROCEDURE — 85025 COMPLETE CBC W/AUTO DIFF WBC: CPT

## 2024-08-15 PROCEDURE — 80053 COMPREHEN METABOLIC PANEL: CPT

## 2024-08-15 PROCEDURE — 1100000000 HC RM PRIVATE

## 2024-08-15 PROCEDURE — 36415 COLL VENOUS BLD VENIPUNCTURE: CPT

## 2024-08-15 RX ORDER — CLOPIDOGREL BISULFATE 75 MG/1
75 TABLET ORAL DAILY
Status: DISCONTINUED | OUTPATIENT
Start: 2024-08-15 | End: 2024-08-16 | Stop reason: HOSPADM

## 2024-08-15 RX ADMIN — OXYCODONE HYDROCHLORIDE 5 MG: 5 TABLET ORAL at 12:08

## 2024-08-15 RX ADMIN — ASPIRIN 81 MG CHEWABLE TABLET 81 MG: 81 TABLET CHEWABLE at 09:25

## 2024-08-15 RX ADMIN — ATORVASTATIN CALCIUM 40 MG: 40 TABLET, FILM COATED ORAL at 09:26

## 2024-08-15 RX ADMIN — CLOPIDOGREL BISULFATE 75 MG: 75 TABLET ORAL at 17:20

## 2024-08-15 RX ADMIN — ENOXAPARIN SODIUM 40 MG: 100 INJECTION SUBCUTANEOUS at 09:28

## 2024-08-15 RX ADMIN — HYDROXYCHLOROQUINE SULFATE 200 MG: 200 TABLET ORAL at 09:26

## 2024-08-15 RX ADMIN — INSULIN GLARGINE 5 UNITS: 100 INJECTION, SOLUTION SUBCUTANEOUS at 20:50

## 2024-08-15 RX ADMIN — OXYCODONE HYDROCHLORIDE 5 MG: 5 TABLET ORAL at 06:32

## 2024-08-15 RX ADMIN — METOPROLOL TARTRATE 12.5 MG: 25 TABLET, FILM COATED ORAL at 09:26

## 2024-08-15 RX ADMIN — DOXEPIN HYDROCHLORIDE 50 MG: 50 CAPSULE ORAL at 17:21

## 2024-08-15 RX ADMIN — POTASSIUM BICARBONATE 40 MEQ: 782 TABLET, EFFERVESCENT ORAL at 14:32

## 2024-08-15 RX ADMIN — SODIUM CHLORIDE, PRESERVATIVE FREE 10 ML: 5 INJECTION INTRAVENOUS at 09:25

## 2024-08-15 RX ADMIN — DIPHENHYDRAMINE HYDROCHLORIDE 25 MG: 50 INJECTION INTRAMUSCULAR; INTRAVENOUS at 17:31

## 2024-08-15 RX ADMIN — AMLODIPINE BESYLATE 10 MG: 10 TABLET ORAL at 09:26

## 2024-08-15 RX ADMIN — DIPHENHYDRAMINE HYDROCHLORIDE 25 MG: 50 INJECTION INTRAMUSCULAR; INTRAVENOUS at 09:28

## 2024-08-15 RX ADMIN — VENLAFAXINE HYDROCHLORIDE 37.5 MG: 37.5 CAPSULE, EXTENDED RELEASE ORAL at 09:40

## 2024-08-15 RX ADMIN — FLUTICASONE PROPIONATE 1 SPRAY: 50 SPRAY, METERED NASAL at 09:40

## 2024-08-15 RX ADMIN — OXYCODONE HYDROCHLORIDE 5 MG: 5 TABLET ORAL at 17:26

## 2024-08-15 RX ADMIN — INSULIN LISPRO 6 UNITS: 100 INJECTION, SOLUTION INTRAVENOUS; SUBCUTANEOUS at 13:03

## 2024-08-15 RX ADMIN — METOPROLOL TARTRATE 12.5 MG: 25 TABLET, FILM COATED ORAL at 20:44

## 2024-08-15 RX ADMIN — SODIUM CHLORIDE, PRESERVATIVE FREE 10 ML: 5 INJECTION INTRAVENOUS at 20:47

## 2024-08-15 ASSESSMENT — PAIN DESCRIPTION - ORIENTATION
ORIENTATION: RIGHT

## 2024-08-15 ASSESSMENT — PAIN - FUNCTIONAL ASSESSMENT
PAIN_FUNCTIONAL_ASSESSMENT: ACTIVITIES ARE NOT PREVENTED
PAIN_FUNCTIONAL_ASSESSMENT: ACTIVITIES ARE NOT PREVENTED
PAIN_FUNCTIONAL_ASSESSMENT: PREVENTS OR INTERFERES SOME ACTIVE ACTIVITIES AND ADLS

## 2024-08-15 ASSESSMENT — PAIN SCALES - GENERAL
PAINLEVEL_OUTOF10: 9
PAINLEVEL_OUTOF10: 4
PAINLEVEL_OUTOF10: 4
PAINLEVEL_OUTOF10: 8
PAINLEVEL_OUTOF10: 8

## 2024-08-15 ASSESSMENT — PAIN DESCRIPTION - DESCRIPTORS
DESCRIPTORS: CRAMPING
DESCRIPTORS: ACHING;DISCOMFORT
DESCRIPTORS: CRAMPING

## 2024-08-15 ASSESSMENT — PAIN DESCRIPTION - LOCATION
LOCATION: ABDOMEN

## 2024-08-15 ASSESSMENT — PAIN DESCRIPTION - PAIN TYPE: TYPE: ACUTE PAIN

## 2024-08-15 ASSESSMENT — PAIN DESCRIPTION - FREQUENCY: FREQUENCY: CONTINUOUS

## 2024-08-15 ASSESSMENT — PAIN DESCRIPTION - ONSET: ONSET: ON-GOING

## 2024-08-15 NOTE — CARE COORDINATION
08/15/24 0950   Service Assessment   Patient Orientation Alert and Oriented;Person;Place;Situation   Cognition Alert   History Provided By Patient   Primary Caregiver Self   Support Systems Spouse/Significant Other   Patient's Healthcare Decision Maker is: Named in Scanned ACP Document   PCP Verified by CM Yes   Last Visit to PCP Within last 3 months   Prior Functional Level Independent in ADLs/IADLs   Current Functional Level Independent in ADLs/IADLs   Can patient return to prior living arrangement Yes   Ability to make needs known: Good   Family able to assist with home care needs: Yes   Would you like for me to discuss the discharge plan with any other family members/significant others, and if so, who? No   Community Resources None   Social/Functional History   Lives With Spouse   Type of Home House   Home Layout One level   Home Access Stairs to enter with rails   Entrance Stairs - Rails Both   Bathroom Shower/Tub Tub/Shower unit;Walk-in shower   Bathroom Toilet Bedside commode   Bathroom Equipment Shower chair   Home Equipment Cane;Walker - Rolling;Rollator   Receives Help From Family   ADL Assistance Independent   Homemaking Assistance Independent   Ambulation Assistance Independent   Transfer Assistance Independent   Occupation Retired   Discharge Planning   Type of Residence House   Living Arrangements Spouse/Significant Other   Current Services Prior To Admission None   Potential Assistance Needed N/A   Potential Assistance Purchasing Medications No   Type of Home Care Services None   Patient expects to be discharged to: House   Services At/After Discharge   Transition of Care Consult (CM Consult) N/A   Services At/After Discharge None    Resource Information Provided? No   Mode of Transport at Discharge Other (see comment)  ( to transport)   Confirm Follow Up Transport Family   Condition of Participation: Discharge Planning   The Plan for Transition of Care is related to the following

## 2024-08-15 NOTE — PROGRESS NOTES
[facility] Hospitalist Group  Progress Note    Patient: Linda Espinal Age: 64 y.o. : 1960 MR#: [id number]: [id number]  Date/Time: 8/15/2024     Subjective:   Patient doing well at this time.  Diet being advanced.  Discharge likely tomorrow per surgery.    Disposition: Home  Discharge tomorrow    Review of systems  General: No fevers or chills.  Cardiovascular: No chest pain or pressure. No palpitations.   Pulmonary: No shortness of breath, cough or wheeze.   Gastrointestinal: No abdominal pain, nausea, vomiting or diarrhea.   Genitourinary: No urinary frequency, urgency, hesitancy or dysuria.   Musculoskeletal: No joint or muscle pain, no back pain, no recent trauma.    Neurologic: No headache, numbness, tingling or weakness.   Assessment/Plan:   Small bowel obstruction  Hypertension  Type 2 diabetes and hyperglycemia  History of rheumatoid arthritis on hydroxychloroquine  History of CVA on dual antiplatelet therapy  Recurrent major depressive disorder  Chronic back pain on hydrocodone outpatient     Admit to 5 S.  Cardiac monitoring  General Surgery consulted  Small bowel follow-through completed, multiple bowel movements after  Regular liquid diet, advance as tolerated  Small bowel obstruction likely related to the adhesions and prior surgeries.  Restart amlodipine  Restart metoprolol, resume bisoprolol-HCTZ once discharged  Continue Lipitor  POCT glucose checks every ACHS, medium dose sliding scale  Check A1c  Continue hydroxychloroquine  Continue aspirin and Plavix  PT/OT  Judicious pain control with oxycodone   Continue Effexor from recurrent major depressive disorder      I spent 40 minutes with the patient in face-to-face consultation, of which greater than 50% was spent in counseling and coordination of care as described above.     Case discussed with:  [x]Patient  []Family  []Nursing  []Case Management  DVT Prophylaxis:  [x]Lovenox  []Hep SQ  []SCDs  []Coumadin

## 2024-08-15 NOTE — PLAN OF CARE
Problem: Discharge Planning  Goal: Discharge to home or other facility with appropriate resources  Outcome: Progressing     Problem: Pain  Goal: Verbalizes/displays adequate comfort level or baseline comfort level  8/15/2024 0020 by Kathy Roy RN  Outcome: Progressing  8/14/2024 1722 by Mora Kent, RN  Outcome: Progressing  Flowsheets (Taken 8/14/2024 1722)  Verbalizes/displays adequate comfort level or baseline comfort level: Encourage patient to monitor pain and request assistance     Problem: Gastrointestinal - Adult  Goal: Minimal or absence of nausea and vomiting  8/15/2024 0020 by Kathy Roy RN  Outcome: Progressing  8/14/2024 1722 by Mora Kent RN  Outcome: Progressing  Flowsheets (Taken 8/14/2024 1722)  Minimal or absence of nausea and vomiting: Administer IV fluids as ordered to ensure adequate hydration  Goal: Maintains or returns to baseline bowel function  Outcome: Progressing  Goal: Maintains adequate nutritional intake  Outcome: Progressing     Problem: Safety - Adult  Goal: Free from fall injury  Outcome: Progressing

## 2024-08-15 NOTE — PROGRESS NOTES
General Surgery Consult      Linda Espinal  Admit date: 2024    MRN: [id number]     : 1960     Age: 64 y.o.        Attending Physician: Doc David MD, FACS      Subjective:     Linda Espinal is a 64 y.o. female who we are following for evaluation of picture of recurrent small bowel obstruction.  The patient is doing clinically very well and she is tolerating her liquid diet and she is having multiple bowel movements.  She stated that she still have some mild abdominal pain but it is much better.  Her vitals are normal with no fever or tachycardia.    Patient Active Problem List    Diagnosis Date Noted    Recurrent major depressive disorder, in partial remission (HCC) 2023    Abdominal pain 10/21/2020    Generalized abdominal pain     Small bowel obstruction (HCC) 2020    Anemia 08/10/2019    S/P small bowel resection 2019    Chronic pelvic pain in female 2019    Adnexal cyst 2019    Bowel obstruction (HCC) 2018    Non-intractable cyclical vomiting with nausea 2018    Type 2 diabetes mellitus with nephropathy (HCC) 2018    Post-operative pain 2017    Chronic abdominal pain 2017    SBO (small bowel obstruction) (HCC) 2017    GERD (gastroesophageal reflux disease) 2016    Osteoarthritis of left knee 2016    Asthma 2016    Type 2 diabetes mellitus (HCC) 2016    Hyperlipidemia 2016    Sural neuritis 2014    Chest pain 2014    Essential hypertension, benign 2012     Past Medical History:   Diagnosis Date    Abdominal adhesions     Adnexal cyst 2019    Left    Anemia     Asthma     Chronic pelvic pain in female 2019    Diabetes mellitus (HCC)     Dyskinesia     bilateral    Essential hypertension     GERD (gastroesophageal reflux disease)     Hypertension     Menopause     Microhematuria     Rheumatoid arteritis (HCC)     Stool color black       Past Surgical History:

## 2024-08-16 VITALS
TEMPERATURE: 98.1 F | RESPIRATION RATE: 17 BRPM | BODY MASS INDEX: 26.01 KG/M2 | HEART RATE: 88 BPM | WEIGHT: 152.34 LBS | SYSTOLIC BLOOD PRESSURE: 116 MMHG | DIASTOLIC BLOOD PRESSURE: 76 MMHG | HEIGHT: 64 IN | OXYGEN SATURATION: 98 %

## 2024-08-16 LAB
ALBUMIN SERPL-MCNC: 2.7 G/DL (ref 3.4–5)
ALBUMIN/GLOB SERPL: 0.8 (ref 0.8–1.7)
ALP SERPL-CCNC: 98 U/L (ref 45–117)
ALT SERPL-CCNC: 17 U/L (ref 13–56)
ANION GAP SERPL CALC-SCNC: 5 MMOL/L (ref 3–18)
AST SERPL-CCNC: 14 U/L (ref 10–38)
BASOPHILS # BLD: 0 K/UL (ref 0–0.1)
BASOPHILS NFR BLD: 1 % (ref 0–2)
BILIRUB SERPL-MCNC: 0.4 MG/DL (ref 0.2–1)
BUN SERPL-MCNC: 10 MG/DL (ref 7–18)
BUN/CREAT SERPL: 11 (ref 12–20)
CALCIUM SERPL-MCNC: 8.5 MG/DL (ref 8.5–10.1)
CHLORIDE SERPL-SCNC: 105 MMOL/L (ref 100–111)
CO2 SERPL-SCNC: 26 MMOL/L (ref 21–32)
CREAT SERPL-MCNC: 0.95 MG/DL (ref 0.6–1.3)
DIFFERENTIAL METHOD BLD: ABNORMAL
EOSINOPHIL # BLD: 0.1 K/UL (ref 0–0.4)
EOSINOPHIL NFR BLD: 1 % (ref 0–5)
ERYTHROCYTE [DISTWIDTH] IN BLOOD BY AUTOMATED COUNT: 13 % (ref 11.6–14.5)
GLOBULIN SER CALC-MCNC: 3.5 G/DL (ref 2–4)
GLUCOSE BLD STRIP.AUTO-MCNC: 177 MG/DL (ref 70–110)
GLUCOSE SERPL-MCNC: 277 MG/DL (ref 74–99)
HCT VFR BLD AUTO: 36.2 % (ref 35–45)
HGB BLD-MCNC: 11.9 G/DL (ref 12–16)
IMM GRANULOCYTES # BLD AUTO: 0 K/UL (ref 0–0.04)
IMM GRANULOCYTES NFR BLD AUTO: 0 % (ref 0–0.5)
LYMPHOCYTES # BLD: 1.4 K/UL (ref 0.9–3.6)
LYMPHOCYTES NFR BLD: 26 % (ref 21–52)
MCH RBC QN AUTO: 29.7 PG (ref 24–34)
MCHC RBC AUTO-ENTMCNC: 32.9 G/DL (ref 31–37)
MCV RBC AUTO: 90.3 FL (ref 78–100)
MONOCYTES # BLD: 0.6 K/UL (ref 0.05–1.2)
MONOCYTES NFR BLD: 12 % (ref 3–10)
NEUTS SEG # BLD: 3.2 K/UL (ref 1.8–8)
NEUTS SEG NFR BLD: 60 % (ref 40–73)
NRBC # BLD: 0 K/UL (ref 0–0.01)
NRBC BLD-RTO: 0 PER 100 WBC
PLATELET # BLD AUTO: 156 K/UL (ref 135–420)
PMV BLD AUTO: 11.7 FL (ref 9.2–11.8)
POTASSIUM SERPL-SCNC: 4.1 MMOL/L (ref 3.5–5.5)
PROT SERPL-MCNC: 6.2 G/DL (ref 6.4–8.2)
RBC # BLD AUTO: 4.01 M/UL (ref 4.2–5.3)
SODIUM SERPL-SCNC: 136 MMOL/L (ref 136–145)
WBC # BLD AUTO: 5.4 K/UL (ref 4.6–13.2)

## 2024-08-16 PROCEDURE — 94761 N-INVAS EAR/PLS OXIMETRY MLT: CPT

## 2024-08-16 PROCEDURE — 2580000003 HC RX 258: Performed by: INTERNAL MEDICINE

## 2024-08-16 PROCEDURE — 6370000000 HC RX 637 (ALT 250 FOR IP): Performed by: HOSPITALIST

## 2024-08-16 PROCEDURE — 6370000000 HC RX 637 (ALT 250 FOR IP): Performed by: INTERNAL MEDICINE

## 2024-08-16 PROCEDURE — 82962 GLUCOSE BLOOD TEST: CPT

## 2024-08-16 PROCEDURE — 85025 COMPLETE CBC W/AUTO DIFF WBC: CPT

## 2024-08-16 PROCEDURE — 99233 SBSQ HOSP IP/OBS HIGH 50: CPT | Performed by: SURGERY

## 2024-08-16 PROCEDURE — 6360000002 HC RX W HCPCS: Performed by: SURGERY

## 2024-08-16 PROCEDURE — 99239 HOSP IP/OBS DSCHRG MGMT >30: CPT | Performed by: INTERNAL MEDICINE

## 2024-08-16 PROCEDURE — 36415 COLL VENOUS BLD VENIPUNCTURE: CPT

## 2024-08-16 PROCEDURE — 6360000002 HC RX W HCPCS: Performed by: INTERNAL MEDICINE

## 2024-08-16 PROCEDURE — 80053 COMPREHEN METABOLIC PANEL: CPT

## 2024-08-16 RX ADMIN — ASPIRIN 81 MG CHEWABLE TABLET 81 MG: 81 TABLET CHEWABLE at 09:04

## 2024-08-16 RX ADMIN — AMLODIPINE BESYLATE 10 MG: 10 TABLET ORAL at 09:02

## 2024-08-16 RX ADMIN — ATORVASTATIN CALCIUM 40 MG: 40 TABLET, FILM COATED ORAL at 09:02

## 2024-08-16 RX ADMIN — DIPHENHYDRAMINE HYDROCHLORIDE 25 MG: 50 INJECTION INTRAMUSCULAR; INTRAVENOUS at 10:25

## 2024-08-16 RX ADMIN — FLUTICASONE PROPIONATE 1 SPRAY: 50 SPRAY, METERED NASAL at 09:07

## 2024-08-16 RX ADMIN — METOPROLOL TARTRATE 12.5 MG: 25 TABLET, FILM COATED ORAL at 09:02

## 2024-08-16 RX ADMIN — VENLAFAXINE HYDROCHLORIDE 37.5 MG: 37.5 CAPSULE, EXTENDED RELEASE ORAL at 09:04

## 2024-08-16 RX ADMIN — ENOXAPARIN SODIUM 40 MG: 100 INJECTION SUBCUTANEOUS at 09:01

## 2024-08-16 RX ADMIN — CLOPIDOGREL BISULFATE 75 MG: 75 TABLET ORAL at 09:03

## 2024-08-16 RX ADMIN — OXYCODONE HYDROCHLORIDE 5 MG: 5 TABLET ORAL at 05:58

## 2024-08-16 RX ADMIN — OXYCODONE HYDROCHLORIDE 5 MG: 5 TABLET ORAL at 00:19

## 2024-08-16 RX ADMIN — SODIUM CHLORIDE, PRESERVATIVE FREE 10 ML: 5 INJECTION INTRAVENOUS at 09:04

## 2024-08-16 RX ADMIN — DIPHENHYDRAMINE HYDROCHLORIDE 25 MG: 50 INJECTION INTRAMUSCULAR; INTRAVENOUS at 00:22

## 2024-08-16 RX ADMIN — HYDROXYCHLOROQUINE SULFATE 200 MG: 200 TABLET ORAL at 09:02

## 2024-08-16 ASSESSMENT — PAIN DESCRIPTION - DESCRIPTORS
DESCRIPTORS: ACHING;DISCOMFORT

## 2024-08-16 ASSESSMENT — PAIN SCALES - GENERAL
PAINLEVEL_OUTOF10: 7
PAINLEVEL_OUTOF10: 0
PAINLEVEL_OUTOF10: 2
PAINLEVEL_OUTOF10: 2
PAINLEVEL_OUTOF10: 10
PAINLEVEL_OUTOF10: 6

## 2024-08-16 ASSESSMENT — PAIN DESCRIPTION - ONSET
ONSET: ON-GOING

## 2024-08-16 ASSESSMENT — PAIN - FUNCTIONAL ASSESSMENT
PAIN_FUNCTIONAL_ASSESSMENT: ACTIVITIES ARE NOT PREVENTED

## 2024-08-16 ASSESSMENT — PAIN DESCRIPTION - LOCATION
LOCATION: ABDOMEN

## 2024-08-16 ASSESSMENT — PAIN DESCRIPTION - PAIN TYPE
TYPE: ACUTE PAIN

## 2024-08-16 ASSESSMENT — PAIN DESCRIPTION - FREQUENCY
FREQUENCY: CONTINUOUS

## 2024-08-16 ASSESSMENT — PAIN DESCRIPTION - ORIENTATION: ORIENTATION: RIGHT

## 2024-08-16 NOTE — PLAN OF CARE
Problem: Discharge Planning  Goal: Discharge to home or other facility with appropriate resources  Outcome: Progressing  Flowsheets (Taken 8/15/2024 0800 by Shawna Clay, RN)  Discharge to home or other facility with appropriate resources: Identify barriers to discharge with patient and caregiver     Problem: Pain  Goal: Verbalizes/displays adequate comfort level or baseline comfort level  Outcome: Progressing     Problem: Gastrointestinal - Adult  Goal: Minimal or absence of nausea and vomiting  Outcome: Progressing  Flowsheets (Taken 8/15/2024 0800 by Shawna Clay, RN)  Minimal or absence of nausea and vomiting: Administer IV fluids as ordered to ensure adequate hydration  Goal: Maintains or returns to baseline bowel function  Outcome: Progressing  Flowsheets (Taken 8/15/2024 0800 by Shawna Clay, RN)  Maintains or returns to baseline bowel function: Administer ordered medications as needed  Goal: Maintains adequate nutritional intake  Outcome: Progressing  Flowsheets (Taken 8/15/2024 0800 by Shawna Clay, RN)  Maintains adequate nutritional intake: Assist with meals as needed     Problem: Safety - Adult  Goal: Free from fall injury  Outcome: Progressing

## 2024-08-16 NOTE — CARE COORDINATION
Discharge order noted for today. Orders received. No needs identified at this time. Case management remains available as needed.       Nia Roberts, MSSERGEI     [phone]

## 2024-08-16 NOTE — PROGRESS NOTES
General Surgery Consult      Linda Espinal  Admit date: 2024    MRN: [id number]     : 1960     Age: 64 y.o.        Attending Physician: Doc David MD, FACS      Subjective:     Linda Espinal is a 64 y.o. female who we are following for evaluation of recurrent small bowel obstruction in the setting of multiple comorbidities and multiple abdominal surgeries that is resolving with medical management.  Patient continued to do well and she still complaining of some abdominal pain but it is getting better.  She is tolerating her regular diet and she is having multiple bowel movements.  Her vitals are normal with no fever or tachycardia.     Patient Active Problem List    Diagnosis Date Noted    Recurrent major depressive disorder, in partial remission (HCC) 2023    Abdominal pain 10/21/2020    Generalized abdominal pain     Small bowel obstruction (HCC) 2020    Anemia 08/10/2019    S/P small bowel resection 2019    Chronic pelvic pain in female 2019    Adnexal cyst 2019    Bowel obstruction (HCC) 2018    Non-intractable cyclical vomiting with nausea 2018    Type 2 diabetes mellitus with nephropathy (HCC) 2018    Post-operative pain 2017    Chronic abdominal pain 2017    SBO (small bowel obstruction) (HCC) 2017    GERD (gastroesophageal reflux disease) 2016    Osteoarthritis of left knee 2016    Asthma 2016    Type 2 diabetes mellitus (HCC) 2016    Hyperlipidemia 2016    Sural neuritis 2014    Chest pain 2014    Essential hypertension, benign 2012     Past Medical History:   Diagnosis Date    Abdominal adhesions     Adnexal cyst 2019    Left    Anemia     Asthma     Chronic pelvic pain in female 2019    Diabetes mellitus (HCC)     Dyskinesia     bilateral    Essential hypertension     GERD (gastroesophageal reflux disease)     Hypertension     Menopause     Microhematuria   Daily    hydroxychloroquine (PLAQUENIL) tablet 200 mg  200 mg Oral Daily    venlafaxine (EFFEXOR XR) extended release capsule 37.5 mg  37.5 mg Oral Daily    vitamin D (ERGOCALCIFEROL) capsule 50,000 Units  50,000 Units Oral Weekly    sodium chloride flush 0.9 % injection 5-40 mL  5-40 mL IntraVENous 2 times per day    sodium chloride flush 0.9 % injection 5-40 mL  5-40 mL IntraVENous PRN    0.9 % sodium chloride infusion   IntraVENous PRN    potassium chloride 10 mEq/100 mL IVPB (Peripheral Line)  10 mEq IntraVENous PRN    magnesium sulfate 2000 mg in 50 mL IVPB premix  2,000 mg IntraVENous PRN    ondansetron (ZOFRAN-ODT) disintegrating tablet 4 mg  4 mg Oral Q8H PRN    Or    ondansetron (ZOFRAN) injection 4 mg  4 mg IntraVENous Q6H PRN    polyethylene glycol (GLYCOLAX) packet 17 g  17 g Oral Daily PRN    acetaminophen (TYLENOL) tablet 650 mg  650 mg Oral Q6H PRN    Or    acetaminophen (TYLENOL) suppository 650 mg  650 mg Rectal Q6H PRN    enoxaparin (LOVENOX) injection 40 mg  40 mg SubCUTAneous Daily    oxyCODONE (ROXICODONE) immediate release tablet 5 mg  5 mg Oral Q4H PRN    albuterol (PROVENTIL) (2.5 MG/3ML) 0.083% nebulizer solution 2.5 mg  2.5 mg Nebulization Q6H PRN    insulin lispro (HUMALOG,ADMELOG) injection vial 0-8 Units  0-8 Units SubCUTAneous TID WC    insulin lispro (HUMALOG,ADMELOG) injection vial 0-4 Units  0-4 Units SubCUTAneous Nightly    glucose chewable tablet 16 g  4 tablet Oral PRN    dextrose bolus 10% 125 mL  125 mL IntraVENous PRN    Or    dextrose bolus 10% 250 mL  250 mL IntraVENous PRN    Glucagon Emergency SOLR 1 mg  1 mg SubCUTAneous PRN    dextrose 10 % infusion   IntraVENous Continuous PRN    insulin glargine (LANTUS) injection vial 5 Units  5 Units SubCUTAneous Nightly      Allergies   Allergen Reactions    Adhesive Tape Other (See Comments)     Paper tape-- feels like burning skin    Burned skin when had wound vac    Other reaction(s): rash/itching    Nitrofurantoin Itching, Other

## 2024-08-16 NOTE — DISCHARGE INSTRUCTIONS
Discharge Instructions    Patient: Linda Espinal MRN: [id number]  SSN: [id number]    YOB: 1960  Age: 64 y.o.  Sex: female      Activity  As tolerated, walking encourage, stairs are okay.  Avoid strenuous activities - no lifting anything heavier than 15 pounds till seen in the clinic.    Diet and Medications  Regular diet    Follow-Up    Call the office of Dr. Doc aDvid at [phone] to make your follow-up appointment.

## 2024-08-16 NOTE — PROGRESS NOTES
Surgery    Pt reports cramps and flatus but no BM. She has no more nausea but had lots of cramps overnight    Patient Vitals for the past 12 hrs:   Temp Pulse Resp BP SpO2   02/26/17 0727 98.3 °F (36.8 °C) 95 17 149/86 95 %   02/26/17 0320 97.8 °F (36.6 °C) 81 17 121/77 95 %       Date 02/25/17 0700 - 02/26/17 0659 02/26/17 0700 - 02/27/17 0659   Shift 5695-0897 2013-7389 24 Hour Total 9182-5758 1120-4704 24 Hour Total   I  N  T  A  K  E   P.O. 480 360 840         P. O. 480 360 840       I.V.  (mL/kg/hr)  589.2  (0.7) 589.2  (0.3)         Volume (lactated ringers infusion)  589.2 589.2       Shift Total  (mL/kg) 480  (6.8) 949.2  (13.5) 1429.2  (20.3)      O  U  T  P  U  T   Urine  (mL/kg/hr) 1500  (1.8) 950  (1.1) 2450  (1.5) 300  300      Urine Voided 3414 315 2142 300  300    Shift Total  (mL/kg) 1500  (21.3) 950  (13.5) 2450  (34.8) 300  (4.3)  300  (4.3)   NET -1020 -0.8 -1020.8 -300  -300   Weight (kg) 70.3 70.3 70.3 70.3 70.3 70.3     Wound - clean and intact    Abd: soft, moderately distended, active but a little slow bowel sounds    Recent Results (from the past 12 hour(s))   GLUCOSE, POC    Collection Time: 02/26/17  5:12 AM   Result Value Ref Range    Glucose (POC) 88 70 - 110 mg/dL   GLUCOSE, POC    Collection Time: 02/26/17 11:18 AM   Result Value Ref Range    Glucose (POC) 128 (H) 70 - 110 mg/dL     Imp: POD 4 SB resection and RANJITH    Resolving but persistent ileus    Since cramping so much, will keep today and use pain meds as needed. I suspect the cramping will pass as ileus resolves. If so, probably home in 24-48 hours. No

## 2024-08-16 NOTE — DISCHARGE SUMMARY
Discharge Summary    Patient: Linda Espinal MRN: [id number]  CSN: [id number]    YOB: 1960  Age: 64 y.o.  Sex: female    DOA: 8/13/2024 LOS:  LOS: 3 days   Discharge Date:      Admission Diagnosis: SBO (small bowel obstruction) ([facility]) [K56.609]    Discharge Diagnosis:    Small bowel obstruction  Hypertension  Type 2 diabetes and hyperglycemia  History of rheumatoid arthritis on hydroxychloroquine  History of CVA on dual antiplatelet therapy  Recurrent major depressive disorder  Chronic back pain on hydrocodone outpatient    Discharge Condition: Stable    PHYSICAL EXAM  Visit Vitals  /71   Pulse 89   Temp 97.8 °F (36.6 °C) (Oral)   Resp 16   Ht 1.626 m (5' 4.02\")   Wt 69.1 kg (152 lb 5.4 oz)   SpO2 97%   BMI 26.14 kg/m²       General:  Alert, cooperative, no acute distress    HEENT: PERRLA, anicteric sclerae.  Pulmonary:  CTA Bilaterally. No Wheezing/Rales.  Cardiovascular: Regular rate and Rhythm.  GI:  Soft, Non distended, mild abdominal tenderness. + Bowel sounds.  Extremities:  No edema. No calf tenderness.   Psych: Good insight. Not anxious or agitated.  Neurologic: Alert and oriented X 3. Moves all ext.  Additional:    Hospital Course: Per the H&P:Linda Espinal is a 64 y.o.  female with a pmh of Asthma, type 2 DM, GERD, HTN, rheumatoid arthritis, CVA in 2023 on DAPT, who presents with abdominal pain and a small bowel obstruction. Patient reports her symptoms started on 8/10/24. Patient was recently in [place]. She started having cramps in her abdomen, she was up all night urinating and having BM's. The following morning she had severe cramping and she went to the doctor in [place] and was sent home after symptomatic management with antibiotics. She reports prior surgeries with Dr. David. The patient returned to the ER with unresolving pain and was noted to have a small bowel obstruction on CT imaging. The patient is on hydrocodone for back pain

## 2024-08-17 ENCOUNTER — HOSPITAL ENCOUNTER (EMERGENCY)
Facility: HOSPITAL | Age: 64
Discharge: HOME | End: 2024-08-17
Payer: COMMERCIAL

## 2024-08-17 ENCOUNTER — APPOINTMENT (OUTPATIENT)
Facility: HOSPITAL | Age: 64
End: 2024-08-17
Payer: COMMERCIAL

## 2024-08-17 VITALS
WEIGHT: 152 LBS | TEMPERATURE: 97.9 F | BODY MASS INDEX: 25.95 KG/M2 | DIASTOLIC BLOOD PRESSURE: 69 MMHG | OXYGEN SATURATION: 96 % | SYSTOLIC BLOOD PRESSURE: 104 MMHG | HEIGHT: 64 IN | RESPIRATION RATE: 11 BRPM | HEART RATE: 72 BPM

## 2024-08-17 DIAGNOSIS — Z87.19 HISTORY OF SMALL BOWEL OBSTRUCTION: ICD-10-CM

## 2024-08-17 DIAGNOSIS — K59.00 CONSTIPATION, UNSPECIFIED CONSTIPATION TYPE: ICD-10-CM

## 2024-08-17 DIAGNOSIS — R10.84 GENERALIZED ABDOMINAL PAIN: Primary | ICD-10-CM

## 2024-08-17 LAB
ALBUMIN SERPL-MCNC: 3.2 G/DL (ref 3.4–5)
ALBUMIN/GLOB SERPL: 0.7 (ref 0.8–1.7)
ALP SERPL-CCNC: 125 U/L (ref 45–117)
ALT SERPL-CCNC: 21 U/L (ref 13–56)
ANION GAP SERPL CALC-SCNC: 4 MMOL/L (ref 3–18)
APPEARANCE UR: CLEAR
AST SERPL-CCNC: 23 U/L (ref 10–38)
BASOPHILS # BLD: 0 K/UL (ref 0–0.1)
BASOPHILS NFR BLD: 0 % (ref 0–2)
BILIRUB SERPL-MCNC: 0.4 MG/DL (ref 0.2–1)
BILIRUB UR QL: NEGATIVE
BUN SERPL-MCNC: 9 MG/DL (ref 7–18)
BUN/CREAT SERPL: 9 (ref 12–20)
CALCIUM SERPL-MCNC: 9.5 MG/DL (ref 8.5–10.1)
CHLORIDE SERPL-SCNC: 103 MMOL/L (ref 100–111)
CO2 SERPL-SCNC: 25 MMOL/L (ref 21–32)
COLOR UR: YELLOW
CREAT SERPL-MCNC: 0.96 MG/DL (ref 0.6–1.3)
DIFFERENTIAL METHOD BLD: ABNORMAL
EOSINOPHIL # BLD: 0.1 K/UL (ref 0–0.4)
EOSINOPHIL NFR BLD: 2 % (ref 0–5)
ERYTHROCYTE [DISTWIDTH] IN BLOOD BY AUTOMATED COUNT: 12.8 % (ref 11.6–14.5)
GLOBULIN SER CALC-MCNC: 4.4 G/DL (ref 2–4)
GLUCOSE SERPL-MCNC: 282 MG/DL (ref 74–99)
GLUCOSE UR STRIP.AUTO-MCNC: NEGATIVE MG/DL
HCT VFR BLD AUTO: 43.2 % (ref 35–45)
HGB BLD-MCNC: 14.3 G/DL (ref 12–16)
HGB UR QL STRIP: NEGATIVE
IMM GRANULOCYTES # BLD AUTO: 0 K/UL (ref 0–0.04)
IMM GRANULOCYTES NFR BLD AUTO: 0 % (ref 0–0.5)
KETONES UR QL STRIP.AUTO: NEGATIVE MG/DL
LEUKOCYTE ESTERASE UR QL STRIP.AUTO: NEGATIVE
LIPASE SERPL-CCNC: 30 U/L (ref 13–75)
LYMPHOCYTES # BLD: 1.3 K/UL (ref 0.9–3.6)
LYMPHOCYTES NFR BLD: 28 % (ref 21–52)
MCH RBC QN AUTO: 28.9 PG (ref 24–34)
MCHC RBC AUTO-ENTMCNC: 33.1 G/DL (ref 31–37)
MCV RBC AUTO: 87.4 FL (ref 78–100)
MONOCYTES # BLD: 0.5 K/UL (ref 0.05–1.2)
MONOCYTES NFR BLD: 11 % (ref 3–10)
NEUTS SEG # BLD: 2.8 K/UL (ref 1.8–8)
NEUTS SEG NFR BLD: 59 % (ref 40–73)
NITRITE UR QL STRIP.AUTO: NEGATIVE
NRBC # BLD: 0 K/UL (ref 0–0.01)
NRBC BLD-RTO: 0 PER 100 WBC
PH UR STRIP: 5.5 (ref 5–8)
PLATELET # BLD AUTO: 235 K/UL (ref 135–420)
PMV BLD AUTO: 10.5 FL (ref 9.2–11.8)
POTASSIUM SERPL-SCNC: 4.5 MMOL/L (ref 3.5–5.5)
PROT SERPL-MCNC: 7.6 G/DL (ref 6.4–8.2)
PROT UR STRIP-MCNC: NEGATIVE MG/DL
RBC # BLD AUTO: 4.94 M/UL (ref 4.2–5.3)
SODIUM SERPL-SCNC: 132 MMOL/L (ref 136–145)
SP GR UR REFRACTOMETRY: >1.03 (ref 1–1.03)
UROBILINOGEN UR QL STRIP.AUTO: 0.2 EU/DL (ref 0.2–1)
WBC # BLD AUTO: 4.8 K/UL (ref 4.6–13.2)

## 2024-08-17 PROCEDURE — 2580000003 HC RX 258: Performed by: HEALTH CARE PROVIDER

## 2024-08-17 PROCEDURE — 96375 TX/PRO/DX INJ NEW DRUG ADDON: CPT

## 2024-08-17 PROCEDURE — 81003 URINALYSIS AUTO W/O SCOPE: CPT

## 2024-08-17 PROCEDURE — 80053 COMPREHEN METABOLIC PANEL: CPT

## 2024-08-17 PROCEDURE — 6360000004 HC RX CONTRAST MEDICATION: Performed by: HEALTH CARE PROVIDER

## 2024-08-17 PROCEDURE — 99285 EMERGENCY DEPT VISIT HI MDM: CPT

## 2024-08-17 PROCEDURE — 85025 COMPLETE CBC W/AUTO DIFF WBC: CPT

## 2024-08-17 PROCEDURE — 74177 CT ABD & PELVIS W/CONTRAST: CPT

## 2024-08-17 PROCEDURE — 6360000002 HC RX W HCPCS: Performed by: HEALTH CARE PROVIDER

## 2024-08-17 PROCEDURE — 83690 ASSAY OF LIPASE: CPT

## 2024-08-17 PROCEDURE — 96374 THER/PROPH/DIAG INJ IV PUSH: CPT

## 2024-08-17 RX ORDER — ONDANSETRON 2 MG/ML
4 INJECTION INTRAMUSCULAR; INTRAVENOUS
Status: COMPLETED | OUTPATIENT
Start: 2024-08-17 | End: 2024-08-17

## 2024-08-17 RX ORDER — SODIUM CHLORIDE 9 MG/ML
INJECTION, SOLUTION INTRAVENOUS CONTINUOUS
Status: DISCONTINUED | OUTPATIENT
Start: 2024-08-17 | End: 2024-08-17 | Stop reason: HOSPADM

## 2024-08-17 RX ORDER — DIPHENHYDRAMINE HYDROCHLORIDE 50 MG/ML
12.5 INJECTION INTRAMUSCULAR; INTRAVENOUS ONCE
Status: COMPLETED | OUTPATIENT
Start: 2024-08-17 | End: 2024-08-17

## 2024-08-17 RX ORDER — MORPHINE SULFATE 10 MG/ML
6 INJECTION, SOLUTION INTRAMUSCULAR; INTRAVENOUS ONCE
Status: COMPLETED | OUTPATIENT
Start: 2024-08-17 | End: 2024-08-17

## 2024-08-17 RX ADMIN — SODIUM CHLORIDE: 9 INJECTION, SOLUTION INTRAVENOUS at 17:16

## 2024-08-17 RX ADMIN — MORPHINE SULFATE 6 MG: 10 INJECTION, SOLUTION INTRAMUSCULAR; INTRAVENOUS at 17:13

## 2024-08-17 RX ADMIN — IOPAMIDOL 100 ML: 612 INJECTION, SOLUTION INTRAVENOUS at 17:42

## 2024-08-17 RX ADMIN — DIPHENHYDRAMINE HYDROCHLORIDE 12.5 MG: 50 INJECTION INTRAMUSCULAR; INTRAVENOUS at 17:11

## 2024-08-17 RX ADMIN — ONDANSETRON 4 MG: 2 INJECTION INTRAMUSCULAR; INTRAVENOUS at 17:10

## 2024-08-17 ASSESSMENT — ENCOUNTER SYMPTOMS
ABDOMINAL PAIN: 1
ABDOMINAL DISTENTION: 1
VOMITING: 1
CHEST TIGHTNESS: 0
SHORTNESS OF BREATH: 0
DIARRHEA: 0
NAUSEA: 0
COUGH: 0

## 2024-08-17 ASSESSMENT — PAIN - FUNCTIONAL ASSESSMENT: PAIN_FUNCTIONAL_ASSESSMENT: 0-10

## 2024-08-17 ASSESSMENT — PAIN DESCRIPTION - LOCATION: LOCATION: GENERALIZED;ABDOMEN

## 2024-08-17 ASSESSMENT — PAIN SCALES - GENERAL
PAINLEVEL_OUTOF10: 10
PAINLEVEL_OUTOF10: 10

## 2024-08-17 NOTE — ED NOTES
Bedside shift change report given to Samantha RN (oncoming nurse) by Ellen RN (offgoing nurse). Report included the following information ED Encounter Summary.

## 2024-08-17 NOTE — ED PROVIDER NOTES
EMERGENCY DEPARTMENT HISTORY AND PHYSICAL EXAM        Date: 8/17/2024  Patient Name: Linda Espinal    History of Presenting Illness     Chief Complaint   Patient presents with    Abdominal Pain       History Provided By: History obtained from patient    HPI: Linda Espinal, 64 y.o. female presents to the ED with cc of abdominal pain, small bowel obstruction    Abdominal pain started on August 10, 2024, patient presented to [facility] ED and was transferred to [facility] for continuity of care due to previous surgeries at [facility] with Dr. Armstrong.  She arrived at [facility] August 13 and was discharged on August 16.  She reports having 3 small bowel movements in the last 24 hours, vomiting once last night.  She was not discharged with any pain medications and surgery note discourages use of narcotics.  She was able to eat a piece of pizza yesterday and had a bowl of cereal today.    Blood thinners: ASA Plavix    Pmh: Small bowel obstruction, hypertension, type 2 diabetes, rheumatoid arthritis on Hydroxychloroquine, CVA on dual antiplatelet therapy, chronic back pain on hydrocodone outpatient, multiple surgeries laparotomies, history of enterocutaneous fistula small bowel resection 2010 and 2017    No nausea, vomiting, diarrhea, fever, chills, chest pain, shortness of breath, leg swelling     There are no other complaints, changes, or physical findings at this time.    Records Reviewed: na    PCP: Nirav Gupta MD    No current facility-administered medications on file prior to encounter.     Current Outpatient Medications on File Prior to Encounter   Medication Sig Dispense Refill    aspirin 81 MG chewable tablet Take 1 tablet by mouth daily      doxepin (SINEQUAN) 50 MG capsule take 1 capsule by mouth every evening 90 capsule 3    vitamin D (ERGOCALCIFEROL) 1.25 MG (17631 UT) CAPS capsule take 1 capsule by mouth every week 12 capsule 4    venlafaxine (EFFEXOR XR) 37.5 MG extended release capsule take  black        Past Surgical History:  Past Surgical History:   Procedure Laterality Date    CHOLECYSTECTOMY  6/28/10    COLONOSCOPY N/A 1/21/2019    COLONOSCOPY performed by Evelyne Mcnamara MD at [facility] ENDOSCOPY    COLONOSCOPY      HERNIA REPAIR      HYSTERECTOMY (CERVIX STATUS UNKNOWN)  1989    Fibroids    ORTHOPEDIC SURGERY Right     ankle- multiple surgeries    OVARY REMOVAL  12/2000    SMALL INTESTINE SURGERY  12/2000    SMALL INTESTINE SURGERY  02/21/2017    Dr. David    TISSUE LOCALIZATION-EXCISION      TOTAL KNEE ARTHROPLASTY Right 06/2018    TOTAL KNEE ARTHROPLASTY Left     UPPER GASTROINTESTINAL ENDOSCOPY         Family History:  Family History   Problem Relation Age of Onset    Diabetes Brother     Kidney Disease Brother     Diabetes Other         parent    Hypertension Other         sibling    Diabetes Mother     Lung Disease Father     Diabetes Father     Heart Disease Father     Breast Cancer Other 20    Hypertension Other         parent    Lung Disease Brother        Social History:  Social History     Tobacco Use    Smoking status: Never    Smokeless tobacco: Never   Substance Use Topics    Alcohol use: No    Drug use: No       Allergies:  Allergies   Allergen Reactions    Adhesive Tape Other (See Comments)     Paper tape-- feels like burning skin    Burned skin when had wound vac    Other reaction(s): rash/itching    Nitrofurantoin Itching, Other (See Comments) and Hives     Other reaction(s): rash/itching    Tramadol      Other reaction(s): rash/itching    Other Itching    Nitrofurantoin Macrocrystal Rash         Review of Systems   Review of Systems   Constitutional:  Negative for appetite change, fatigue and fever.   Respiratory:  Negative for cough, chest tightness and shortness of breath.    Cardiovascular:  Negative for chest pain, palpitations and leg swelling.   Gastrointestinal:  Positive for abdominal distention, abdominal pain and vomiting. Negative for diarrhea and nausea.   Genitourinary:

## 2024-08-17 NOTE — ED TRIAGE NOTES
Received patient in triage with c/o generlaized abdominal pain. Pt was discharged yesterday from an in-patient stay for a SBO.Pt stated that the pain feels the same. Abdomen is tender. Pt reports 3 small bowel movements today with one episode of vomiting.

## 2024-08-18 NOTE — DISCHARGE INSTRUCTIONS
Constipation outpatient management: If using MiraLAX, take with 8 ounces of Gatorade to ensure a electrolyte balance.  Keep a high-fiber diet with items such as bran oatmeal, Metamucil or FiberCon supplements.  Prune juice warmed up in a microwave can also assist with a bowel movement.  Be sure to have adequate fluids as some constipation is caused by dehydration and fluid is necessary for good bowel movements.  Ambulation assists with GI motility so be sure to move frequently during the day and avoid resting or sitting for long periods of time.  Some people who are lactose intolerant find that small amounts of milk or yogurt can stimulate loose stools.  Return to emergency department if severe abdominal pain, any blood in the stool, feeling weak or lightheaded as this may indicate electrolyte imbalance,

## 2024-08-19 ENCOUNTER — APPOINTMENT (OUTPATIENT)
Facility: HOSPITAL | Age: 64
End: 2024-08-19
Payer: COMMERCIAL

## 2024-08-19 ENCOUNTER — HOSPITAL ENCOUNTER (EMERGENCY)
Facility: HOSPITAL | Age: 64
Discharge: HOME | End: 2024-08-20
Attending: EMERGENCY MEDICINE
Payer: COMMERCIAL

## 2024-08-19 VITALS
HEIGHT: 64 IN | DIASTOLIC BLOOD PRESSURE: 75 MMHG | SYSTOLIC BLOOD PRESSURE: 113 MMHG | RESPIRATION RATE: 18 BRPM | HEART RATE: 86 BPM | BODY MASS INDEX: 25.95 KG/M2 | TEMPERATURE: 98.6 F | WEIGHT: 152 LBS | OXYGEN SATURATION: 96 %

## 2024-08-19 DIAGNOSIS — K59.00 CONSTIPATION, UNSPECIFIED CONSTIPATION TYPE: Primary | ICD-10-CM

## 2024-08-19 LAB
ALBUMIN SERPL-MCNC: 3.5 G/DL (ref 3.4–5)
ALBUMIN/GLOB SERPL: 0.9 (ref 0.8–1.7)
ALP SERPL-CCNC: 147 U/L (ref 45–117)
ALT SERPL-CCNC: 26 U/L (ref 13–56)
ANION GAP SERPL CALC-SCNC: 5 MMOL/L (ref 3–18)
APPEARANCE UR: CLEAR
AST SERPL-CCNC: 21 U/L (ref 10–38)
BASOPHILS # BLD: 0 K/UL (ref 0–0.1)
BASOPHILS NFR BLD: 1 % (ref 0–2)
BILIRUB SERPL-MCNC: 0.2 MG/DL (ref 0.2–1)
BILIRUB UR QL: NEGATIVE
BUN SERPL-MCNC: 16 MG/DL (ref 7–18)
BUN/CREAT SERPL: 14 (ref 12–20)
CALCIUM SERPL-MCNC: 10 MG/DL (ref 8.5–10.1)
CHLORIDE SERPL-SCNC: 100 MMOL/L (ref 100–111)
CO2 SERPL-SCNC: 30 MMOL/L (ref 21–32)
COLOR UR: YELLOW
CREAT SERPL-MCNC: 1.15 MG/DL (ref 0.6–1.3)
DIFFERENTIAL METHOD BLD: NORMAL
EOSINOPHIL # BLD: 0.1 K/UL (ref 0–0.4)
EOSINOPHIL NFR BLD: 2 % (ref 0–5)
ERYTHROCYTE [DISTWIDTH] IN BLOOD BY AUTOMATED COUNT: 12.8 % (ref 11.6–14.5)
GLOBULIN SER CALC-MCNC: 4 G/DL (ref 2–4)
GLUCOSE SERPL-MCNC: 279 MG/DL (ref 74–99)
GLUCOSE UR STRIP.AUTO-MCNC: >1000 MG/DL
HCT VFR BLD AUTO: 44.8 % (ref 35–45)
HGB BLD-MCNC: 15 G/DL (ref 12–16)
HGB UR QL STRIP: NEGATIVE
IMM GRANULOCYTES # BLD AUTO: 0 K/UL (ref 0–0.04)
IMM GRANULOCYTES NFR BLD AUTO: 0 % (ref 0–0.5)
KETONES UR QL STRIP.AUTO: NEGATIVE MG/DL
LEUKOCYTE ESTERASE UR QL STRIP.AUTO: NEGATIVE
LIPASE SERPL-CCNC: 33 U/L (ref 13–75)
LYMPHOCYTES # BLD: 1.7 K/UL (ref 0.9–3.6)
LYMPHOCYTES NFR BLD: 31 % (ref 21–52)
MCH RBC QN AUTO: 29 PG (ref 24–34)
MCHC RBC AUTO-ENTMCNC: 33.5 G/DL (ref 31–37)
MCV RBC AUTO: 86.5 FL (ref 78–100)
MONOCYTES # BLD: 0.6 K/UL (ref 0.05–1.2)
MONOCYTES NFR BLD: 10 % (ref 3–10)
NEUTS SEG # BLD: 3.1 K/UL (ref 1.8–8)
NEUTS SEG NFR BLD: 56 % (ref 40–73)
NITRITE UR QL STRIP.AUTO: NEGATIVE
NRBC # BLD: 0 K/UL (ref 0–0.01)
NRBC BLD-RTO: 0 PER 100 WBC
PH UR STRIP: 7 (ref 5–8)
PLATELET # BLD AUTO: 269 K/UL (ref 135–420)
PMV BLD AUTO: 10.6 FL (ref 9.2–11.8)
POTASSIUM SERPL-SCNC: 4.9 MMOL/L (ref 3.5–5.5)
PROT SERPL-MCNC: 7.5 G/DL (ref 6.4–8.2)
PROT UR STRIP-MCNC: NEGATIVE MG/DL
RBC # BLD AUTO: 5.18 M/UL (ref 4.2–5.3)
SODIUM SERPL-SCNC: 135 MMOL/L (ref 136–145)
SP GR UR REFRACTOMETRY: >1.03 (ref 1–1.03)
UROBILINOGEN UR QL STRIP.AUTO: 0.2 EU/DL (ref 0.2–1)
WBC # BLD AUTO: 5.5 K/UL (ref 4.6–13.2)

## 2024-08-19 PROCEDURE — 99285 EMERGENCY DEPT VISIT HI MDM: CPT

## 2024-08-19 PROCEDURE — 2580000003 HC RX 258: Performed by: EMERGENCY MEDICINE

## 2024-08-19 PROCEDURE — 85025 COMPLETE CBC W/AUTO DIFF WBC: CPT

## 2024-08-19 PROCEDURE — 83690 ASSAY OF LIPASE: CPT

## 2024-08-19 PROCEDURE — 96360 HYDRATION IV INFUSION INIT: CPT

## 2024-08-19 PROCEDURE — 96374 THER/PROPH/DIAG INJ IV PUSH: CPT

## 2024-08-19 PROCEDURE — 81003 URINALYSIS AUTO W/O SCOPE: CPT

## 2024-08-19 PROCEDURE — 96361 HYDRATE IV INFUSION ADD-ON: CPT

## 2024-08-19 PROCEDURE — 74177 CT ABD & PELVIS W/CONTRAST: CPT

## 2024-08-19 PROCEDURE — 96376 TX/PRO/DX INJ SAME DRUG ADON: CPT

## 2024-08-19 PROCEDURE — 6360000002 HC RX W HCPCS: Performed by: EMERGENCY MEDICINE

## 2024-08-19 PROCEDURE — 80053 COMPREHEN METABOLIC PANEL: CPT

## 2024-08-19 PROCEDURE — 6360000004 HC RX CONTRAST MEDICATION: Performed by: EMERGENCY MEDICINE

## 2024-08-19 PROCEDURE — 96375 TX/PRO/DX INJ NEW DRUG ADDON: CPT

## 2024-08-19 RX ORDER — DIPHENHYDRAMINE HYDROCHLORIDE 50 MG/ML
25 INJECTION INTRAMUSCULAR; INTRAVENOUS
Status: COMPLETED | OUTPATIENT
Start: 2024-08-19 | End: 2024-08-19

## 2024-08-19 RX ORDER — POLYETHYLENE GLYCOL 3350, SODIUM CHLORIDE, SODIUM BICARBONATE, POTASSIUM CHLORIDE 420; 11.2; 5.72; 1.48 G/4L; G/4L; G/4L; G/4L
4000 POWDER, FOR SOLUTION ORAL ONCE
Qty: 4000 ML | Refills: 0 | Status: SHIPPED | OUTPATIENT
Start: 2024-08-20 | End: 2024-08-20

## 2024-08-19 RX ORDER — 0.9 % SODIUM CHLORIDE 0.9 %
1000 INTRAVENOUS SOLUTION INTRAVENOUS ONCE
Status: COMPLETED | OUTPATIENT
Start: 2024-08-19 | End: 2024-08-19

## 2024-08-19 RX ORDER — ONDANSETRON 2 MG/ML
4 INJECTION INTRAMUSCULAR; INTRAVENOUS
Status: COMPLETED | OUTPATIENT
Start: 2024-08-19 | End: 2024-08-19

## 2024-08-19 RX ORDER — MORPHINE SULFATE 4 MG/ML
4 INJECTION, SOLUTION INTRAMUSCULAR; INTRAVENOUS
Status: COMPLETED | OUTPATIENT
Start: 2024-08-19 | End: 2024-08-19

## 2024-08-19 RX ORDER — POLYETHYLENE GLYCOL 3350 17 G/17G
17 POWDER, FOR SOLUTION ORAL DAILY
Qty: 578 G | Refills: 0 | Status: SHIPPED | OUTPATIENT
Start: 2024-08-19 | End: 2024-09-22

## 2024-08-19 RX ADMIN — DIPHENHYDRAMINE HYDROCHLORIDE 25 MG: 50 INJECTION INTRAMUSCULAR; INTRAVENOUS at 22:32

## 2024-08-19 RX ADMIN — ONDANSETRON 4 MG: 2 INJECTION INTRAMUSCULAR; INTRAVENOUS at 19:45

## 2024-08-19 RX ADMIN — MORPHINE SULFATE 4 MG: 4 INJECTION, SOLUTION INTRAMUSCULAR; INTRAVENOUS at 22:18

## 2024-08-19 RX ADMIN — SODIUM CHLORIDE 1000 ML: 9 INJECTION, SOLUTION INTRAVENOUS at 18:45

## 2024-08-19 RX ADMIN — IOPAMIDOL 80 ML: 612 INJECTION, SOLUTION INTRAVENOUS at 21:12

## 2024-08-19 RX ADMIN — MORPHINE SULFATE 4 MG: 4 INJECTION, SOLUTION INTRAMUSCULAR; INTRAVENOUS at 19:44

## 2024-08-19 ASSESSMENT — PAIN SCALES - GENERAL: PAINLEVEL_OUTOF10: 10

## 2024-08-19 ASSESSMENT — PAIN - FUNCTIONAL ASSESSMENT: PAIN_FUNCTIONAL_ASSESSMENT: 0-10

## 2024-08-19 ASSESSMENT — PAIN DESCRIPTION - LOCATION: LOCATION: ABDOMEN

## 2024-08-19 NOTE — ED TRIAGE NOTES
Pt wheeled to triage c/o abd pain. Pt was seen Saturday for same issue. Pt was recently discharged from hospital (admitted for SBO).

## 2024-08-19 NOTE — ED PROVIDER NOTES
EMERGENCY DEPARTMENT HISTORY AND PHYSICAL EXAM      Patient Name: Linda Espinal  MRN: [id number]  YOB: 1960  Provider: Ceci Chatman MD  PCP: Nirav Gupta MD   Time/Date of evaluation: 7:31 PM EDT on 8/19/24    History of Presenting Illness     Chief Complaint   Patient presents with    Abdominal Pain       History Provided By: Patient     History (Narative):   Linda Espinal is a 64 y.o. female with a PMHX of asthma, diabetes, GERD, hypertension, rheumatoid arthritis, CVA, multiple abdominal surgeries, and recurrent bowel obstructions  who presents to the emergency department  by POV C/O lower abdominal pain.  The patient states that she was just discharged on Friday for a bowel obstruction.  She has increasing pain that feels similar to her prior bowel obstructions.  She is feeling gassy, having loose stools, and is taking milk of magnesia right now.  She threw up twice today.    The patient states that she has had bowel resections in 2010 and 2017.  She states that she has had bowel obstructions off and on since then.        Past History     Past Medical History:  Past Medical History:   Diagnosis Date    Abdominal adhesions     Adnexal cyst 7/30/2019    Left    Anemia     Asthma     Chronic pelvic pain in female 7/30/2019    Diabetes mellitus (HCC)     Dyskinesia     bilateral    Essential hypertension     GERD (gastroesophageal reflux disease)     Hypertension     Menopause     Microhematuria     Rheumatoid arteritis (HCC) 2018    Stool color black        Past Surgical History:  Past Surgical History:   Procedure Laterality Date    CHOLECYSTECTOMY  6/28/10    COLONOSCOPY N/A 1/21/2019    COLONOSCOPY performed by Evelyne Mcnamara MD at [facility] ENDOSCOPY    COLONOSCOPY      HERNIA REPAIR      HYSTERECTOMY (CERVIX STATUS UNKNOWN)  1989    Fibroids    ORTHOPEDIC SURGERY Right     ankle- multiple surgeries    OVARY REMOVAL  12/2000    SMALL INTESTINE SURGERY  12/2000    SMALL INTESTINE SURGERY

## 2024-08-21 ENCOUNTER — OFFICE VISIT (OUTPATIENT)
Facility: CLINIC | Age: 64
End: 2024-08-21

## 2024-08-21 VITALS
BODY MASS INDEX: 25.78 KG/M2 | RESPIRATION RATE: 17 BRPM | HEIGHT: 64 IN | WEIGHT: 151 LBS | TEMPERATURE: 97.3 F | HEART RATE: 81 BPM | OXYGEN SATURATION: 97 % | SYSTOLIC BLOOD PRESSURE: 108 MMHG | DIASTOLIC BLOOD PRESSURE: 72 MMHG

## 2024-08-21 DIAGNOSIS — M54.50 CHRONIC BILATERAL LOW BACK PAIN WITHOUT SCIATICA: ICD-10-CM

## 2024-08-21 DIAGNOSIS — K56.609 SBO (SMALL BOWEL OBSTRUCTION) (HCC): Primary | ICD-10-CM

## 2024-08-21 DIAGNOSIS — D17.1 LIPOMA OF BACK: ICD-10-CM

## 2024-08-21 DIAGNOSIS — E11.21 TYPE 2 DIABETES MELLITUS WITH NEPHROPATHY (HCC): ICD-10-CM

## 2024-08-21 DIAGNOSIS — M21.41 FLAT FOOT (PES PLANUS) (ACQUIRED), RIGHT FOOT: ICD-10-CM

## 2024-08-21 DIAGNOSIS — B37.31 CANDIDA VAGINITIS: ICD-10-CM

## 2024-08-21 DIAGNOSIS — N18.9 CHRONIC KIDNEY DISEASE, UNSPECIFIED CKD STAGE: ICD-10-CM

## 2024-08-21 DIAGNOSIS — Z09 HOSPITAL DISCHARGE FOLLOW-UP: ICD-10-CM

## 2024-08-21 DIAGNOSIS — G89.29 CHRONIC BILATERAL LOW BACK PAIN WITHOUT SCIATICA: ICD-10-CM

## 2024-08-21 RX ORDER — FLUCONAZOLE 150 MG/1
150 TABLET ORAL
Qty: 6 TABLET | Refills: 0 | Status: SHIPPED | OUTPATIENT
Start: 2024-08-21 | End: 2024-09-08

## 2024-08-21 RX ORDER — DAPAGLIFLOZIN 10 MG/1
10 TABLET, FILM COATED ORAL DAILY
Qty: 90 TABLET | Refills: 3 | Status: SHIPPED | OUTPATIENT
Start: 2024-08-21

## 2024-08-21 RX ORDER — HYDROCODONE BITARTRATE AND ACETAMINOPHEN 5; 325 MG/1; MG/1
1 TABLET ORAL EVERY 8 HOURS PRN
Qty: 30 TABLET | Refills: 0 | Status: SHIPPED | OUTPATIENT
Start: 2024-08-21 | End: 2024-09-20

## 2024-08-21 RX ORDER — ONDANSETRON 4 MG/1
4 TABLET, ORALLY DISINTEGRATING ORAL EVERY 8 HOURS PRN
Qty: 30 TABLET | Refills: 1 | Status: SHIPPED | OUTPATIENT
Start: 2024-08-21

## 2024-08-21 SDOH — ECONOMIC STABILITY: FOOD INSECURITY: WITHIN THE PAST 12 MONTHS, THE FOOD YOU BOUGHT JUST DIDN'T LAST AND YOU DIDN'T HAVE MONEY TO GET MORE.: NEVER TRUE

## 2024-08-21 SDOH — ECONOMIC STABILITY: INCOME INSECURITY: HOW HARD IS IT FOR YOU TO PAY FOR THE VERY BASICS LIKE FOOD, HOUSING, MEDICAL CARE, AND HEATING?: NOT VERY HARD

## 2024-08-21 SDOH — ECONOMIC STABILITY: FOOD INSECURITY: WITHIN THE PAST 12 MONTHS, YOU WORRIED THAT YOUR FOOD WOULD RUN OUT BEFORE YOU GOT MONEY TO BUY MORE.: NEVER TRUE

## 2024-08-21 ASSESSMENT — PATIENT HEALTH QUESTIONNAIRE - PHQ9
SUM OF ALL RESPONSES TO PHQ QUESTIONS 1-9: 0
SUM OF ALL RESPONSES TO PHQ QUESTIONS 1-9: 0
SUM OF ALL RESPONSES TO PHQ9 QUESTIONS 1 & 2: 0
SUM OF ALL RESPONSES TO PHQ QUESTIONS 1-9: 0
SUM OF ALL RESPONSES TO PHQ QUESTIONS 1-9: 0
1. LITTLE INTEREST OR PLEASURE IN DOING THINGS: NOT AT ALL
2. FEELING DOWN, DEPRESSED OR HOPELESS: NOT AT ALL

## 2024-08-21 NOTE — PROGRESS NOTES
and no other therapies.   Ongoing symptoms include: insomnia.   Patient denies: palpitations, sweating, chest pain, shortness of breath, dizziness, paresthesias, racing thoughts, psychomotor agitation, feelings of losing control, difficulty concentrating, suicidal ideation.   Reported side effects from the treatment: none.      ROS   General ROS: negative for - fatigue, fever, weight gain, or weight loss  Respiratory ROS: positive for - cough  negative for - shortness of breath  Cardiovascular ROS: no chest pain or dyspnea on exertion  Gastrointestinal ROS: no abdominal pain, change in bowel habits, or black or bloody stools  Genito-Urinary ROS: no dysuria, trouble voiding, or hematuria  positive for - urinary frequency/urgency  Dermatological ROS: negative for - rash    All other systems reviewed and are negative.      Objective:  Vitals:    08/21/24 0811   BP: 108/72   Pulse: 81   Resp: 17   Temp: 97.3 °F (36.3 °C)   SpO2: 97%         alert, well appearing, and in no distress and oriented to person, place, and time  General appearance - alert, well appearing, and in no distress and oriented to person, place, and time  Chest - clear to auscultation, no wheezes, rales or rhonchi, symmetric air entry  Heart - normal rate, regular rhythm, normal S1, S2, no murmurs, rubs, clicks or gallops  Abdomen - soft, nontender, nondistended, no masses or organomegaly  bowel sounds normal  Extremities - peripheral pulses normal, no pedal edema, no clubbing or cyanosis  Musculoskeletal - tenderness to palpation over lower back      LABS     TESTS      Assessment/Plan:    1. SBO (small bowel obstruction) (HCC)  Antiemetic ordered; small meals continued with caution  - ondansetron (ZOFRAN-ODT) 4 MG disintegrating tablet; Place 1 tablet under the tongue every 8 hours as needed for Nausea or Vomiting  Dispense: 30 tablet; Refill: 1    2. Hospital discharge follow-up    - [place] DISCHARGE MEDS RECONCILED W/ CURRENT OUTPATIENT MED LIST    3.  labs reviewed, I note that urinalysis normal, abnormal glucose levels (>1000 mg/dL)      I have discussed the diagnosis with the patient and the intended plan as seen in the above orders.  The patient has received an after-visit summary and questions were answered concerning future plans.  I have discussed medication side effects and warnings with the patient as well. I have reviewed the plan of care with the patient, accepted their input and they are in agreement with the treatment goals.

## 2024-08-21 NOTE — PROGRESS NOTES
Linda Espinal is a 64 y.o. presents today for   Chief Complaint   Patient presents with    Follow-Up from Hospital     Is someone accompanying this pt? no    Is the patient using any DME equipment during OV? no  There were no vitals filed for this visit.    Depression Screenin/21/2024     8:08 AM 2024    11:51 AM 2024     9:11 AM 3/26/2024    11:29 AM 2024     9:19 AM 2023     9:47 AM 2023     9:46 AM   PHQ-9 Questionaire   Little interest or pleasure in doing things 0 0 0 0 0 0 0   Feeling down, depressed, or hopeless 0 0 0 1 0 0 0   Trouble falling or staying asleep, or sleeping too much  0 0  0 0    Feeling tired or having little energy  0 0  0 0    Poor appetite or overeating  0 0  0 0    Feeling bad about yourself - or that you are a failure or have let yourself or your family down  0 0  0 0    Trouble concentrating on things, such as reading the newspaper or watching television  0 0  0 0    Moving or speaking so slowly that other people could have noticed. Or the opposite - being so fidgety or restless that you have been moving around a lot more than usual  0 0  0 0    Thoughts that you would be better off dead, or of hurting yourself in some way  0 0  0 0    PHQ-9 Total Score 0 0 0 1 0 0 0   If you checked off any problems, how difficult have these problems made it for you to do your work, take care of things at home, or get along with other people?  0 0  0 0         Abuse Screenin/9/2024     9:00 AM 2023    11:00 AM 3/13/2023     3:00 PM 2023    12:00 PM   AMB Abuse Screening   Do you ever feel afraid of your partner? N N N N   Are you in a relationship with someone who physically or mentally threatens you? N N N N   Is it safe for you to go home? Y Y Y Y        Learning Assessment Screening:   No question data found.     Fall Risk Screenin/9/2024     9:30 AM 2023    11:10 AM 3/13/2023     3:12 PM 2023    12:24 PM   Fall Risk   Do

## 2024-09-02 ENCOUNTER — APPOINTMENT (OUTPATIENT)
Facility: HOSPITAL | Age: 64
End: 2024-09-02
Payer: COMMERCIAL

## 2024-09-02 ENCOUNTER — HOSPITAL ENCOUNTER (EMERGENCY)
Facility: HOSPITAL | Age: 64
Discharge: HOME | End: 2024-09-02
Payer: COMMERCIAL

## 2024-09-02 VITALS
WEIGHT: 152 LBS | RESPIRATION RATE: 18 BRPM | BODY MASS INDEX: 25.95 KG/M2 | DIASTOLIC BLOOD PRESSURE: 71 MMHG | SYSTOLIC BLOOD PRESSURE: 114 MMHG | TEMPERATURE: 98.2 F | HEIGHT: 64 IN | HEART RATE: 80 BPM | OXYGEN SATURATION: 97 %

## 2024-09-02 DIAGNOSIS — G89.29 CHRONIC ABDOMINAL PAIN: ICD-10-CM

## 2024-09-02 DIAGNOSIS — R10.30 LOWER ABDOMINAL PAIN: Primary | ICD-10-CM

## 2024-09-02 DIAGNOSIS — R10.9 CHRONIC ABDOMINAL PAIN: ICD-10-CM

## 2024-09-02 LAB
ALBUMIN SERPL-MCNC: 3.3 G/DL (ref 3.4–5)
ALBUMIN/GLOB SERPL: 0.8 (ref 0.8–1.7)
ALP SERPL-CCNC: 123 U/L (ref 45–117)
ALT SERPL-CCNC: 35 U/L (ref 13–56)
ANION GAP SERPL CALC-SCNC: 4 MMOL/L (ref 3–18)
APPEARANCE UR: CLEAR
AST SERPL-CCNC: 46 U/L (ref 10–38)
BASOPHILS # BLD: 0.1 K/UL (ref 0–0.1)
BASOPHILS NFR BLD: 1 % (ref 0–2)
BILIRUB SERPL-MCNC: 0.4 MG/DL (ref 0.2–1)
BILIRUB UR QL: NEGATIVE
BUN SERPL-MCNC: 18 MG/DL (ref 7–18)
BUN/CREAT SERPL: 12 (ref 12–20)
CALCIUM SERPL-MCNC: 9.5 MG/DL (ref 8.5–10.1)
CHLORIDE SERPL-SCNC: 101 MMOL/L (ref 100–111)
CO2 SERPL-SCNC: 28 MMOL/L (ref 21–32)
COLOR UR: YELLOW
CREAT SERPL-MCNC: 1.47 MG/DL (ref 0.6–1.3)
DIFFERENTIAL METHOD BLD: ABNORMAL
EOSINOPHIL # BLD: 0.2 K/UL (ref 0–0.4)
EOSINOPHIL NFR BLD: 3 % (ref 0–5)
ERYTHROCYTE [DISTWIDTH] IN BLOOD BY AUTOMATED COUNT: 13.2 % (ref 11.6–14.5)
GLOBULIN SER CALC-MCNC: 4 G/DL (ref 2–4)
GLUCOSE SERPL-MCNC: 286 MG/DL (ref 74–99)
GLUCOSE UR STRIP.AUTO-MCNC: >1000 MG/DL
HCT VFR BLD AUTO: 40 % (ref 35–45)
HGB BLD-MCNC: 12.9 G/DL (ref 12–16)
HGB UR QL STRIP: NEGATIVE
IMM GRANULOCYTES # BLD AUTO: 0 K/UL (ref 0–0.04)
IMM GRANULOCYTES NFR BLD AUTO: 0 % (ref 0–0.5)
KETONES UR QL STRIP.AUTO: NEGATIVE MG/DL
LEUKOCYTE ESTERASE UR QL STRIP.AUTO: NEGATIVE
LIPASE SERPL-CCNC: 43 U/L (ref 13–75)
LYMPHOCYTES # BLD: 1.9 K/UL (ref 0.9–3.6)
LYMPHOCYTES NFR BLD: 31 % (ref 21–52)
MCH RBC QN AUTO: 28.9 PG (ref 24–34)
MCHC RBC AUTO-ENTMCNC: 32.3 G/DL (ref 31–37)
MCV RBC AUTO: 89.5 FL (ref 78–100)
MONOCYTES # BLD: 0.7 K/UL (ref 0.05–1.2)
MONOCYTES NFR BLD: 11 % (ref 3–10)
NEUTS SEG # BLD: 3.4 K/UL (ref 1.8–8)
NEUTS SEG NFR BLD: 55 % (ref 40–73)
NITRITE UR QL STRIP.AUTO: NEGATIVE
NRBC # BLD: 0 K/UL (ref 0–0.01)
NRBC BLD-RTO: 0 PER 100 WBC
PH UR STRIP: 5.5 (ref 5–8)
PLATELET # BLD AUTO: 271 K/UL (ref 135–420)
PMV BLD AUTO: 10.3 FL (ref 9.2–11.8)
POTASSIUM SERPL-SCNC: 4.9 MMOL/L (ref 3.5–5.5)
PROT SERPL-MCNC: 7.3 G/DL (ref 6.4–8.2)
PROT UR STRIP-MCNC: NEGATIVE MG/DL
RBC # BLD AUTO: 4.47 M/UL (ref 4.2–5.3)
SODIUM SERPL-SCNC: 133 MMOL/L (ref 136–145)
SP GR UR REFRACTOMETRY: 1.02 (ref 1–1.03)
UROBILINOGEN UR QL STRIP.AUTO: 0.2 EU/DL (ref 0.2–1)
WBC # BLD AUTO: 6.3 K/UL (ref 4.6–13.2)

## 2024-09-02 PROCEDURE — 81003 URINALYSIS AUTO W/O SCOPE: CPT

## 2024-09-02 PROCEDURE — 96361 HYDRATE IV INFUSION ADD-ON: CPT

## 2024-09-02 PROCEDURE — 6370000000 HC RX 637 (ALT 250 FOR IP): Performed by: NURSE PRACTITIONER

## 2024-09-02 PROCEDURE — 80053 COMPREHEN METABOLIC PANEL: CPT

## 2024-09-02 PROCEDURE — 6360000004 HC RX CONTRAST MEDICATION: Performed by: NURSE PRACTITIONER

## 2024-09-02 PROCEDURE — 85025 COMPLETE CBC W/AUTO DIFF WBC: CPT

## 2024-09-02 PROCEDURE — 6360000002 HC RX W HCPCS: Performed by: NURSE PRACTITIONER

## 2024-09-02 PROCEDURE — 99285 EMERGENCY DEPT VISIT HI MDM: CPT

## 2024-09-02 PROCEDURE — 96374 THER/PROPH/DIAG INJ IV PUSH: CPT

## 2024-09-02 PROCEDURE — 2580000003 HC RX 258: Performed by: NURSE PRACTITIONER

## 2024-09-02 PROCEDURE — 96375 TX/PRO/DX INJ NEW DRUG ADDON: CPT

## 2024-09-02 PROCEDURE — 83690 ASSAY OF LIPASE: CPT

## 2024-09-02 PROCEDURE — 74177 CT ABD & PELVIS W/CONTRAST: CPT

## 2024-09-02 RX ORDER — OXYCODONE AND ACETAMINOPHEN 5; 325 MG/1; MG/1
1 TABLET ORAL
Status: COMPLETED | OUTPATIENT
Start: 2024-09-02 | End: 2024-09-02

## 2024-09-02 RX ORDER — 0.9 % SODIUM CHLORIDE 0.9 %
1000 INTRAVENOUS SOLUTION INTRAVENOUS ONCE
Status: COMPLETED | OUTPATIENT
Start: 2024-09-02 | End: 2024-09-02

## 2024-09-02 RX ORDER — IODIXANOL 320 MG/ML
100 INJECTION, SOLUTION INTRAVASCULAR
Status: COMPLETED | OUTPATIENT
Start: 2024-09-02 | End: 2024-09-02

## 2024-09-02 RX ORDER — HYDROXYZINE HYDROCHLORIDE 25 MG/1
25 TABLET, FILM COATED ORAL
Status: COMPLETED | OUTPATIENT
Start: 2024-09-02 | End: 2024-09-02

## 2024-09-02 RX ORDER — ONDANSETRON 2 MG/ML
4 INJECTION INTRAMUSCULAR; INTRAVENOUS ONCE
Status: COMPLETED | OUTPATIENT
Start: 2024-09-02 | End: 2024-09-02

## 2024-09-02 RX ORDER — DICYCLOMINE HCL 20 MG
20 TABLET ORAL 4 TIMES DAILY PRN
Qty: 60 TABLET | Refills: 0 | Status: ON HOLD | OUTPATIENT
Start: 2024-09-02

## 2024-09-02 RX ADMIN — ONDANSETRON 4 MG: 2 INJECTION INTRAMUSCULAR; INTRAVENOUS at 18:37

## 2024-09-02 RX ADMIN — HYDROXYZINE HYDROCHLORIDE 25 MG: 25 TABLET, FILM COATED ORAL at 20:00

## 2024-09-02 RX ADMIN — SODIUM CHLORIDE 1000 ML: 9 INJECTION, SOLUTION INTRAVENOUS at 18:37

## 2024-09-02 RX ADMIN — HYDROMORPHONE HYDROCHLORIDE 0.5 MG: 1 INJECTION, SOLUTION INTRAMUSCULAR; INTRAVENOUS; SUBCUTANEOUS at 18:37

## 2024-09-02 RX ADMIN — IODIXANOL 80 ML: 320 INJECTION, SOLUTION INTRAVASCULAR at 18:52

## 2024-09-02 RX ADMIN — OXYCODONE HYDROCHLORIDE AND ACETAMINOPHEN 1 TABLET: 5; 325 TABLET ORAL at 20:00

## 2024-09-02 ASSESSMENT — ENCOUNTER SYMPTOMS
VOMITING: 1
ABDOMINAL PAIN: 1
DIARRHEA: 1
SHORTNESS OF BREATH: 0
NAUSEA: 0

## 2024-09-02 ASSESSMENT — PAIN SCALES - GENERAL
PAINLEVEL_OUTOF10: 10
PAINLEVEL_OUTOF10: 8

## 2024-09-02 ASSESSMENT — PAIN DESCRIPTION - LOCATION
LOCATION: ABDOMEN
LOCATION: ABDOMEN

## 2024-09-02 ASSESSMENT — PAIN - FUNCTIONAL ASSESSMENT: PAIN_FUNCTIONAL_ASSESSMENT: 0-10

## 2024-09-02 NOTE — ED PROVIDER NOTES
[facility] EMERGENCY DEPT  EMERGENCY DEPARTMENT HISTORY AND PHYSICAL EXAM      Date: 9/2/2024  Patient Name: Linda Espinal  MRN: [id number]  YOB: 1960  Date of evaluation: 9/2/2024  Provider: DELORES Lamb NP   Note Started: 5:25 PM EDT 9/2/24      History of Presenting Illness     Chief Complaint   Patient presents with    Abdominal Pain         History Provided By: Patient and medical chart review.    Additional History (Context): Linda Espinal is a 64 y.o. female with   Past Medical History:   Diagnosis Date    Abdominal adhesions     Adnexal cyst 7/30/2019    Left    Anemia     Asthma     Chronic pelvic pain in female 7/30/2019    Diabetes mellitus (HCC)     Dyskinesia     bilateral    Essential hypertension     GERD (gastroesophageal reflux disease)     Hypertension     Menopause     Microhematuria     Rheumatoid arteritis (HCC) 2018    Stool color black    who presents with complaints of severe abdominal pain.  Pain has been chronic in nature due to frequent abdominal surgeries and scar tissue buildup.  States the pain started today.  States the pain is a 10 out of 10 states she did have 1 episode of vomiting today.  States yesterday she had loose stools yesterday.  Patient states she has a history of having bowel obstructions and concerned for that.  Patient denies any chest pain or shortness of breath.  Denies any urinary symptoms.  Patient denies taking any medication for her current symptoms presents for evaluation of her abdominal pain.  Patient is tearful.    PCP: Nirav Gupta MD    No current facility-administered medications for this encounter.     Current Outpatient Medications   Medication Sig Dispense Refill    dicyclomine (BENTYL) 20 MG tablet Take 1 tablet by mouth 4 times daily as needed (abd pain) 60 tablet 0    dapagliflozin (FARXIGA) 10 MG tablet Take 1 tablet by mouth daily 90 tablet 3    HYDROcodone-acetaminophen (NORCO) 5-325 MG per tablet Take 1 tablet by mouth

## 2024-09-02 NOTE — ED TRIAGE NOTES
Patient presented to the Emergency Dept with a complaint of severe abdominal pain and clammy which had been going on for years, however patient states that she was discharge last thursday and since then the pain is getting worse.    Patient rates pain 10/10 on pain scale    Patient alert and oriented x 4, patient breathes freely on room air in nil cardiopulmonary distress

## 2024-09-02 NOTE — ED NOTES
Patient asking for pain medicine and benadryl. ED provider notified and new orders placed (see MAR).

## 2024-09-02 NOTE — ED NOTES
Assumed care of patient. Bedside and verbal shift report given by KEISHA Silverio (off going nurse) to KEISHA Castañeda (oncoming nurse). Report included the following information SBAR and ED Summary.     Patient just returned to room from ct department

## 2024-09-03 ENCOUNTER — TELEPHONE (OUTPATIENT)
Facility: CLINIC | Age: 64
End: 2024-09-03

## 2024-09-03 ENCOUNTER — OFFICE VISIT (OUTPATIENT)
Facility: CLINIC | Age: 64
End: 2024-09-03
Payer: COMMERCIAL

## 2024-09-03 ENCOUNTER — OFFICE VISIT (OUTPATIENT)
Age: 64
End: 2024-09-03
Payer: COMMERCIAL

## 2024-09-03 VITALS
HEART RATE: 72 BPM | WEIGHT: 157.7 LBS | TEMPERATURE: 97.2 F | BODY MASS INDEX: 26.92 KG/M2 | DIASTOLIC BLOOD PRESSURE: 78 MMHG | HEIGHT: 64 IN | OXYGEN SATURATION: 100 % | RESPIRATION RATE: 17 BRPM | SYSTOLIC BLOOD PRESSURE: 122 MMHG

## 2024-09-03 VITALS
HEIGHT: 64 IN | DIASTOLIC BLOOD PRESSURE: 68 MMHG | TEMPERATURE: 97.8 F | BODY MASS INDEX: 26.8 KG/M2 | WEIGHT: 157 LBS | SYSTOLIC BLOOD PRESSURE: 115 MMHG | HEART RATE: 74 BPM | OXYGEN SATURATION: 98 %

## 2024-09-03 DIAGNOSIS — K56.609 SMALL BOWEL OBSTRUCTION (HCC): Primary | ICD-10-CM

## 2024-09-03 DIAGNOSIS — R11.15 NON-INTRACTABLE CYCLICAL VOMITING WITH NAUSEA: ICD-10-CM

## 2024-09-03 DIAGNOSIS — E11.21 TYPE 2 DIABETES MELLITUS WITH NEPHROPATHY (HCC): ICD-10-CM

## 2024-09-03 DIAGNOSIS — Z09 HOSPITAL DISCHARGE FOLLOW-UP: Primary | ICD-10-CM

## 2024-09-03 DIAGNOSIS — R10.9 CHRONIC ABDOMINAL PAIN: ICD-10-CM

## 2024-09-03 DIAGNOSIS — G89.29 CHRONIC BILATERAL LOW BACK PAIN WITHOUT SCIATICA: ICD-10-CM

## 2024-09-03 DIAGNOSIS — R10.84 GENERALIZED ABDOMINAL PAIN: ICD-10-CM

## 2024-09-03 DIAGNOSIS — G89.29 CHRONIC ABDOMINAL PAIN: ICD-10-CM

## 2024-09-03 DIAGNOSIS — I10 ESSENTIAL HYPERTENSION, BENIGN: ICD-10-CM

## 2024-09-03 DIAGNOSIS — M54.50 CHRONIC BILATERAL LOW BACK PAIN WITHOUT SCIATICA: ICD-10-CM

## 2024-09-03 PROCEDURE — 99215 OFFICE O/P EST HI 40 MIN: CPT | Performed by: FAMILY MEDICINE

## 2024-09-03 PROCEDURE — 3074F SYST BP LT 130 MM HG: CPT | Performed by: FAMILY MEDICINE

## 2024-09-03 PROCEDURE — 3046F HEMOGLOBIN A1C LEVEL >9.0%: CPT | Performed by: SURGERY

## 2024-09-03 PROCEDURE — 3078F DIAST BP <80 MM HG: CPT | Performed by: FAMILY MEDICINE

## 2024-09-03 PROCEDURE — 3078F DIAST BP <80 MM HG: CPT | Performed by: SURGERY

## 2024-09-03 PROCEDURE — 3074F SYST BP LT 130 MM HG: CPT | Performed by: SURGERY

## 2024-09-03 PROCEDURE — 99204 OFFICE O/P NEW MOD 45 MIN: CPT | Performed by: SURGERY

## 2024-09-03 PROCEDURE — 3046F HEMOGLOBIN A1C LEVEL >9.0%: CPT | Performed by: FAMILY MEDICINE

## 2024-09-03 PROCEDURE — 1111F DSCHRG MED/CURRENT MED MERGE: CPT | Performed by: FAMILY MEDICINE

## 2024-09-03 RX ORDER — INSULIN GLARGINE 100 [IU]/ML
10 INJECTION, SOLUTION SUBCUTANEOUS NIGHTLY
Qty: 5 ADJUSTABLE DOSE PRE-FILLED PEN SYRINGE | Refills: 3 | Status: ON HOLD | OUTPATIENT
Start: 2024-09-03

## 2024-09-03 RX ORDER — DULAGLUTIDE 0.75 MG/.5ML
0.75 INJECTION, SOLUTION SUBCUTANEOUS WEEKLY
Qty: 4 ADJUSTABLE DOSE PRE-FILLED PEN SYRINGE | Refills: 2 | Status: ON HOLD | OUTPATIENT
Start: 2024-09-03

## 2024-09-03 SDOH — ECONOMIC STABILITY: INCOME INSECURITY: HOW HARD IS IT FOR YOU TO PAY FOR THE VERY BASICS LIKE FOOD, HOUSING, MEDICAL CARE, AND HEATING?: NOT VERY HARD

## 2024-09-03 SDOH — ECONOMIC STABILITY: FOOD INSECURITY: WITHIN THE PAST 12 MONTHS, YOU WORRIED THAT YOUR FOOD WOULD RUN OUT BEFORE YOU GOT MONEY TO BUY MORE.: NEVER TRUE

## 2024-09-03 SDOH — ECONOMIC STABILITY: FOOD INSECURITY: WITHIN THE PAST 12 MONTHS, THE FOOD YOU BOUGHT JUST DIDN'T LAST AND YOU DIDN'T HAVE MONEY TO GET MORE.: NEVER TRUE

## 2024-09-03 ASSESSMENT — PATIENT HEALTH QUESTIONNAIRE - PHQ9
SUM OF ALL RESPONSES TO PHQ QUESTIONS 1-9: 0
3. TROUBLE FALLING OR STAYING ASLEEP: NOT AT ALL
SUM OF ALL RESPONSES TO PHQ QUESTIONS 1-9: 0
SUM OF ALL RESPONSES TO PHQ QUESTIONS 1-9: 0
4. FEELING TIRED OR HAVING LITTLE ENERGY: NOT AT ALL
10. IF YOU CHECKED OFF ANY PROBLEMS, HOW DIFFICULT HAVE THESE PROBLEMS MADE IT FOR YOU TO DO YOUR WORK, TAKE CARE OF THINGS AT HOME, OR GET ALONG WITH OTHER PEOPLE: NOT DIFFICULT AT ALL
2. FEELING DOWN, DEPRESSED OR HOPELESS: NOT AT ALL
8. MOVING OR SPEAKING SO SLOWLY THAT OTHER PEOPLE COULD HAVE NOTICED. OR THE OPPOSITE, BEING SO FIGETY OR RESTLESS THAT YOU HAVE BEEN MOVING AROUND A LOT MORE THAN USUAL: NOT AT ALL
6. FEELING BAD ABOUT YOURSELF - OR THAT YOU ARE A FAILURE OR HAVE LET YOURSELF OR YOUR FAMILY DOWN: NOT AT ALL
1. LITTLE INTEREST OR PLEASURE IN DOING THINGS: NOT AT ALL
5. POOR APPETITE OR OVEREATING: NOT AT ALL
SUM OF ALL RESPONSES TO PHQ9 QUESTIONS 1 & 2: 0
7. TROUBLE CONCENTRATING ON THINGS, SUCH AS READING THE NEWSPAPER OR WATCHING TELEVISION: NOT AT ALL
SUM OF ALL RESPONSES TO PHQ QUESTIONS 1-9: 0
9. THOUGHTS THAT YOU WOULD BE BETTER OFF DEAD, OR OF HURTING YOURSELF: NOT AT ALL

## 2024-09-03 NOTE — DISCHARGE INSTRUCTIONS
Take medication as prescribed. Follow-up with your general surgery within 2 days for reassessment. Bring the results from this visit with you for their review. Return to the ED immediately for any new, worsening, or persistent symptoms, including fever, vomiting, chest pain, shortness of breath, or any other medical concerns.

## 2024-09-03 NOTE — PROGRESS NOTES
Linda Espinal is a 64 y.o. presents today for   Chief Complaint   Patient presents with    Follow-Up from Hospital     Is someone accompanying this pt? no    Is the patient using any DME equipment during OV? no  There were no vitals filed for this visit.    Depression Screenin/3/2024     9:18 AM 2024     8:08 AM 2024    11:51 AM 2024     9:11 AM 3/26/2024    11:29 AM 2024     9:19 AM 2023     9:47 AM   PHQ-9 Questionaire   Little interest or pleasure in doing things 0 0 0 0 0 0 0   Feeling down, depressed, or hopeless 0 0 0 0 1 0 0   Trouble falling or staying asleep, or sleeping too much 0  0 0  0 0   Feeling tired or having little energy 0  0 0  0 0   Poor appetite or overeating 0  0 0  0 0   Feeling bad about yourself - or that you are a failure or have let yourself or your family down 0  0 0  0 0   Trouble concentrating on things, such as reading the newspaper or watching television 0  0 0  0 0   Moving or speaking so slowly that other people could have noticed. Or the opposite - being so fidgety or restless that you have been moving around a lot more than usual 0  0 0  0 0   Thoughts that you would be better off dead, or of hurting yourself in some way 0  0 0  0 0   PHQ-9 Total Score 0 0 0 0 1 0 0   If you checked off any problems, how difficult have these problems made it for you to do your work, take care of things at home, or get along with other people? 0  0 0  0 0        Abuse Screenin/9/2024     9:00 AM 2023    11:00 AM 3/13/2023     3:00 PM 2023    12:00 PM   AMB Abuse Screening   Do you ever feel afraid of your partner? N N N N   Are you in a relationship with someone who physically or mentally threatens you? N N N N   Is it safe for you to go home? Y Y Y Y        Learning Assessment Screening:   No question data found.     Fall Risk Screenin/9/2024     9:30 AM 2023    11:10 AM 3/13/2023     3:12 PM 2023    12:24 PM   Fall Risk

## 2024-09-03 NOTE — PROGRESS NOTES
Linda Espinal is a 64 y.o. female (: 1960) presenting to address:    Chief Complaint   Patient presents with    Follow-Up from Hospital     RLQ abd pain/HX of SBO       Medication list and allergies have been reviewed with Linda Espinal and updated as of today's date.     I have gone over all Medical, Surgical and Social History with Linda Espinal and updated/added the information accordingly.       1. Have you been to the ER, Urgent Care or Hospitalized since your last visit? Yes. [facility] for lower abd pain          2. Have you followed up with your PCP or any other Physicians since your procedure/ last office visit?   No

## 2024-09-03 NOTE — PROGRESS NOTES
Linda Espinal is a 64 y.o.  female and presents with    Chief Complaint   Patient presents with    Follow-Up from Hospital    Medication Refill       Subjective:  Ms. Espinal presents with her  for follow up after hospitalization for 4 days.  She did not have small bowel obstruction.        Diabetes Mellitus:  She has diabetes mellitus, and  Hyperlipidemia.   Diabetic ROS - medication compliance: compliant most of the time but she has been out of glp1 due to supply, diabetic diet compliance: noncompliant some of the time.   Lab review: labs reviewed, hgb A1c 10.1 while inpatient     Depression Review:  Patient is seen for followup of depression. Treatment includes effexor and no other therapies.   Ongoing symptoms include depressed mood, irritability, anhedonia, insomnia, fatigue, feelings of worthlessness/guilt, difficulty concentrating and hopelessness.  She denies hypersomnia, impaired memory and recurrent thoughts of death.   She experiences the following side effects from the treatment: none.    Anxiety Review:  Patient is seen for sleep disturbance. Current treatment includes doxepin and no other therapies.   Ongoing symptoms include: insomnia.   Patient denies: palpitations, sweating, chest pain, shortness of breath, dizziness, paresthesias, racing thoughts, psychomotor agitation, feelings of losing control, difficulty concentrating, suicidal ideation.   Reported side effects from the treatment: none.       ROS   General ROS: negative for - fatigue, fever, weight gain, or weight loss  Respiratory ROS: positive for - cough  negative for - shortness of breath  Cardiovascular ROS: no chest pain or dyspnea on exertion  Gastrointestinal ROS: no abdominal pain, change in bowel habits, or black or bloody stools  Genito-Urinary ROS: no dysuria, trouble voiding, or hematuria  positive for - urinary frequency/urgency  Dermatological ROS: negative for - rash  All other systems reviewed and

## 2024-09-03 NOTE — PROGRESS NOTES
General Surgery Consult      Linda Espinal  Admit date: (Not on file)    MRN: [id number]     : 1960     Age: 64 y.o.        Attending Physician: Doc David MD, FACS      Subjective:     Linda Espinal is a 64 y.o. female who is very well-known to me and she was recently admitted to [facility] with bowel obstruction for which a small bowel series was negative but she was recently also admitted to another institution with abdominal pain and she was told that she has small bowel inflammation and she was prescribed antibiotics which she picked up yesterday but she did not start them yet.  The patient had multiple abdominal surgeries and she has been having chronic abdominal pain with some nausea and vomiting.  She stated that she is able to eat and she is actually gaining weight but she has been having these episodes of sudden onset of pain with nausea and sometimes vomiting.  She has been doing well for almost a year but last month she started having these episodes again.  Today the patient is with her  and she stated that she feels relatively okay and she denies any significant pain or nausea or vomiting this morning.     Patient Active Problem List    Diagnosis Date Noted    Recurrent major depressive disorder, in partial remission ([facility]) 2023    Abdominal pain 10/21/2020    Generalized abdominal pain     Small bowel obstruction ([facility]) 2020    Anemia 08/10/2019    S/P small bowel resection 2019    Chronic pelvic pain in female 2019    Adnexal cyst 2019    Bowel obstruction (HCC) 2018    Non-intractable cyclical vomiting with nausea 2018    Type 2 diabetes mellitus with nephropathy ([facility]) 2018    Post-operative pain 2017    Chronic abdominal pain 2017    SBO (small bowel obstruction) ([facility]) 2017    GERD (gastroesophageal reflux disease) 2016    Osteoarthritis of left knee 2016    Asthma 2016    Type 2 diabetes

## 2024-09-07 ENCOUNTER — HOSPITAL ENCOUNTER (INPATIENT)
Facility: HOSPITAL | Age: 64
LOS: 3 days | Discharge: HOME | DRG: 389 | End: 2024-09-10
Attending: STUDENT IN AN ORGANIZED HEALTH CARE EDUCATION/TRAINING PROGRAM | Admitting: STUDENT IN AN ORGANIZED HEALTH CARE EDUCATION/TRAINING PROGRAM
Payer: COMMERCIAL

## 2024-09-07 ENCOUNTER — APPOINTMENT (OUTPATIENT)
Facility: HOSPITAL | Age: 64
DRG: 389 | End: 2024-09-07
Payer: COMMERCIAL

## 2024-09-07 DIAGNOSIS — G89.29 CHRONIC ABDOMINAL PAIN: Primary | ICD-10-CM

## 2024-09-07 DIAGNOSIS — K56.600 PARTIAL INTESTINAL OBSTRUCTION, UNSPECIFIED CAUSE (HCC): ICD-10-CM

## 2024-09-07 DIAGNOSIS — R10.9 CHRONIC ABDOMINAL PAIN: Primary | ICD-10-CM

## 2024-09-07 PROBLEM — Z86.73 HISTORY OF CVA (CEREBROVASCULAR ACCIDENT): Status: ACTIVE | Noted: 2024-09-07

## 2024-09-07 PROBLEM — M06.9 RA (RHEUMATOID ARTHRITIS) (HCC): Status: ACTIVE | Noted: 2024-09-07

## 2024-09-07 LAB
ALBUMIN SERPL-MCNC: 3.7 G/DL (ref 3.4–5)
ALBUMIN/GLOB SERPL: 0.9 (ref 0.8–1.7)
ALP SERPL-CCNC: 151 U/L (ref 45–117)
ALT SERPL-CCNC: 33 U/L (ref 13–56)
ANION GAP SERPL CALC-SCNC: 6 MMOL/L (ref 3–18)
APPEARANCE UR: CLEAR
AST SERPL-CCNC: 18 U/L (ref 10–38)
BASOPHILS # BLD: 0 K/UL (ref 0–0.1)
BASOPHILS NFR BLD: 1 % (ref 0–2)
BILIRUB SERPL-MCNC: 0.3 MG/DL (ref 0.2–1)
BILIRUB UR QL: NEGATIVE
BUN SERPL-MCNC: 19 MG/DL (ref 7–18)
BUN/CREAT SERPL: 18 (ref 12–20)
CALCIUM SERPL-MCNC: 9.6 MG/DL (ref 8.5–10.1)
CHLORIDE SERPL-SCNC: 100 MMOL/L (ref 100–111)
CO2 SERPL-SCNC: 29 MMOL/L (ref 21–32)
COLOR UR: YELLOW
CREAT SERPL-MCNC: 1.06 MG/DL (ref 0.6–1.3)
DIFFERENTIAL METHOD BLD: ABNORMAL
EOSINOPHIL # BLD: 0.1 K/UL (ref 0–0.4)
EOSINOPHIL NFR BLD: 2 % (ref 0–5)
ERYTHROCYTE [DISTWIDTH] IN BLOOD BY AUTOMATED COUNT: 12.9 % (ref 11.6–14.5)
GLOBULIN SER CALC-MCNC: 4.3 G/DL (ref 2–4)
GLUCOSE SERPL-MCNC: 267 MG/DL (ref 74–99)
GLUCOSE UR STRIP.AUTO-MCNC: >1000 MG/DL
HCT VFR BLD AUTO: 46.7 % (ref 35–45)
HGB BLD-MCNC: 15 G/DL (ref 12–16)
HGB UR QL STRIP: NEGATIVE
IMM GRANULOCYTES # BLD AUTO: 0 K/UL (ref 0–0.04)
IMM GRANULOCYTES NFR BLD AUTO: 0 % (ref 0–0.5)
KETONES UR QL STRIP.AUTO: NEGATIVE MG/DL
LEUKOCYTE ESTERASE UR QL STRIP.AUTO: NEGATIVE
LIPASE SERPL-CCNC: 54 U/L (ref 13–75)
LYMPHOCYTES # BLD: 1.6 K/UL (ref 0.9–3.6)
LYMPHOCYTES NFR BLD: 28 % (ref 21–52)
MCH RBC QN AUTO: 28.6 PG (ref 24–34)
MCHC RBC AUTO-ENTMCNC: 32.1 G/DL (ref 31–37)
MCV RBC AUTO: 89.1 FL (ref 78–100)
MONOCYTES # BLD: 0.4 K/UL (ref 0.05–1.2)
MONOCYTES NFR BLD: 8 % (ref 3–10)
NEUTS SEG # BLD: 3.5 K/UL (ref 1.8–8)
NEUTS SEG NFR BLD: 61 % (ref 40–73)
NITRITE UR QL STRIP.AUTO: NEGATIVE
NRBC # BLD: 0 K/UL (ref 0–0.01)
NRBC BLD-RTO: 0 PER 100 WBC
PH UR STRIP: 6 (ref 5–8)
PLATELET # BLD AUTO: 273 K/UL (ref 135–420)
PMV BLD AUTO: 10.2 FL (ref 9.2–11.8)
POTASSIUM SERPL-SCNC: 4 MMOL/L (ref 3.5–5.5)
PROT SERPL-MCNC: 8 G/DL (ref 6.4–8.2)
PROT UR STRIP-MCNC: NEGATIVE MG/DL
RBC # BLD AUTO: 5.24 M/UL (ref 4.2–5.3)
SODIUM SERPL-SCNC: 135 MMOL/L (ref 136–145)
SP GR UR REFRACTOMETRY: >1.03 (ref 1–1.03)
UROBILINOGEN UR QL STRIP.AUTO: 0.2 EU/DL (ref 0.2–1)
WBC # BLD AUTO: 5.7 K/UL (ref 4.6–13.2)

## 2024-09-07 PROCEDURE — 6360000004 HC RX CONTRAST MEDICATION

## 2024-09-07 PROCEDURE — 96375 TX/PRO/DX INJ NEW DRUG ADDON: CPT

## 2024-09-07 PROCEDURE — 6360000002 HC RX W HCPCS: Performed by: PHYSICIAN ASSISTANT

## 2024-09-07 PROCEDURE — 0D9670Z DRAINAGE OF STOMACH WITH DRAINAGE DEVICE, VIA NATURAL OR ARTIFICIAL OPENING: ICD-10-PCS | Performed by: STUDENT IN AN ORGANIZED HEALTH CARE EDUCATION/TRAINING PROGRAM

## 2024-09-07 PROCEDURE — 83690 ASSAY OF LIPASE: CPT

## 2024-09-07 PROCEDURE — 74018 RADEX ABDOMEN 1 VIEW: CPT

## 2024-09-07 PROCEDURE — 6360000002 HC RX W HCPCS

## 2024-09-07 PROCEDURE — 96374 THER/PROPH/DIAG INJ IV PUSH: CPT

## 2024-09-07 PROCEDURE — 96376 TX/PRO/DX INJ SAME DRUG ADON: CPT

## 2024-09-07 PROCEDURE — 85025 COMPLETE CBC W/AUTO DIFF WBC: CPT

## 2024-09-07 PROCEDURE — 99285 EMERGENCY DEPT VISIT HI MDM: CPT

## 2024-09-07 PROCEDURE — 1100000000 HC RM PRIVATE

## 2024-09-07 PROCEDURE — 80053 COMPREHEN METABOLIC PANEL: CPT

## 2024-09-07 PROCEDURE — 81003 URINALYSIS AUTO W/O SCOPE: CPT

## 2024-09-07 PROCEDURE — 74177 CT ABD & PELVIS W/CONTRAST: CPT

## 2024-09-07 PROCEDURE — 99223 1ST HOSP IP/OBS HIGH 75: CPT | Performed by: PHYSICIAN ASSISTANT

## 2024-09-07 RX ORDER — ACETAMINOPHEN 650 MG/1
650 SUPPOSITORY RECTAL EVERY 6 HOURS PRN
Status: DISCONTINUED | OUTPATIENT
Start: 2024-09-07 | End: 2024-09-08

## 2024-09-07 RX ORDER — DEXTROSE MONOHYDRATE 100 MG/ML
INJECTION, SOLUTION INTRAVENOUS CONTINUOUS PRN
Status: DISCONTINUED | OUTPATIENT
Start: 2024-09-07 | End: 2024-09-10 | Stop reason: HOSPADM

## 2024-09-07 RX ORDER — GLUCAGON 1 MG
1 KIT INJECTION PRN
Status: DISCONTINUED | OUTPATIENT
Start: 2024-09-07 | End: 2024-09-10 | Stop reason: HOSPADM

## 2024-09-07 RX ORDER — INSULIN LISPRO 100 [IU]/ML
0-4 INJECTION, SOLUTION INTRAVENOUS; SUBCUTANEOUS NIGHTLY
Status: DISCONTINUED | OUTPATIENT
Start: 2024-09-07 | End: 2024-09-10 | Stop reason: HOSPADM

## 2024-09-07 RX ORDER — ONDANSETRON 2 MG/ML
4 INJECTION INTRAMUSCULAR; INTRAVENOUS EVERY 6 HOURS PRN
Status: DISCONTINUED | OUTPATIENT
Start: 2024-09-07 | End: 2024-09-10 | Stop reason: HOSPADM

## 2024-09-07 RX ORDER — DIPHENHYDRAMINE HYDROCHLORIDE 50 MG/ML
10 INJECTION INTRAMUSCULAR; INTRAVENOUS ONCE
Status: COMPLETED | OUTPATIENT
Start: 2024-09-07 | End: 2024-09-07

## 2024-09-07 RX ORDER — VENLAFAXINE HYDROCHLORIDE 37.5 MG/1
37.5 CAPSULE, EXTENDED RELEASE ORAL DAILY
Status: DISCONTINUED | OUTPATIENT
Start: 2024-09-08 | End: 2024-09-10 | Stop reason: HOSPADM

## 2024-09-07 RX ORDER — CLOPIDOGREL BISULFATE 75 MG/1
75 TABLET ORAL DAILY
Status: DISCONTINUED | OUTPATIENT
Start: 2024-09-08 | End: 2024-09-10 | Stop reason: HOSPADM

## 2024-09-07 RX ORDER — INSULIN LISPRO 100 [IU]/ML
0-4 INJECTION, SOLUTION INTRAVENOUS; SUBCUTANEOUS
Status: DISCONTINUED | OUTPATIENT
Start: 2024-09-08 | End: 2024-09-10 | Stop reason: HOSPADM

## 2024-09-07 RX ORDER — SODIUM CHLORIDE 9 MG/ML
INJECTION, SOLUTION INTRAVENOUS CONTINUOUS
Status: DISPENSED | OUTPATIENT
Start: 2024-09-07 | End: 2024-09-09

## 2024-09-07 RX ORDER — POLYETHYLENE GLYCOL 3350 17 G/17G
17 POWDER, FOR SOLUTION ORAL DAILY PRN
Status: DISCONTINUED | OUTPATIENT
Start: 2024-09-07 | End: 2024-09-07

## 2024-09-07 RX ORDER — ATORVASTATIN CALCIUM 40 MG/1
40 TABLET, FILM COATED ORAL DAILY
Status: DISCONTINUED | OUTPATIENT
Start: 2024-09-08 | End: 2024-09-10 | Stop reason: HOSPADM

## 2024-09-07 RX ORDER — ONDANSETRON 4 MG/1
4 TABLET, ORALLY DISINTEGRATING ORAL EVERY 8 HOURS PRN
Status: DISCONTINUED | OUTPATIENT
Start: 2024-09-07 | End: 2024-09-10 | Stop reason: HOSPADM

## 2024-09-07 RX ORDER — HYDRALAZINE HYDROCHLORIDE 20 MG/ML
10 INJECTION INTRAMUSCULAR; INTRAVENOUS EVERY 6 HOURS PRN
Status: DISCONTINUED | OUTPATIENT
Start: 2024-09-07 | End: 2024-09-10 | Stop reason: HOSPADM

## 2024-09-07 RX ORDER — ENOXAPARIN SODIUM 100 MG/ML
40 INJECTION SUBCUTANEOUS DAILY
Status: DISCONTINUED | OUTPATIENT
Start: 2024-09-08 | End: 2024-09-10 | Stop reason: HOSPADM

## 2024-09-07 RX ORDER — SODIUM CHLORIDE 0.9 % (FLUSH) 0.9 %
5-40 SYRINGE (ML) INJECTION PRN
Status: DISCONTINUED | OUTPATIENT
Start: 2024-09-07 | End: 2024-09-10 | Stop reason: HOSPADM

## 2024-09-07 RX ORDER — IOPAMIDOL 612 MG/ML
100 INJECTION, SOLUTION INTRAVASCULAR
Status: COMPLETED | OUTPATIENT
Start: 2024-09-07 | End: 2024-09-07

## 2024-09-07 RX ORDER — POTASSIUM CHLORIDE 7.45 MG/ML
10 INJECTION INTRAVENOUS PRN
Status: DISCONTINUED | OUTPATIENT
Start: 2024-09-07 | End: 2024-09-10 | Stop reason: HOSPADM

## 2024-09-07 RX ORDER — POTASSIUM CHLORIDE 1500 MG/1
40 TABLET, EXTENDED RELEASE ORAL PRN
Status: DISCONTINUED | OUTPATIENT
Start: 2024-09-07 | End: 2024-09-10 | Stop reason: HOSPADM

## 2024-09-07 RX ORDER — OXYCODONE HYDROCHLORIDE 5 MG/1
5 TABLET ORAL EVERY 6 HOURS PRN
Status: ON HOLD | COMMUNITY
Start: 2024-08-29 | End: 2024-09-10 | Stop reason: HOSPADM

## 2024-09-07 RX ORDER — MAGNESIUM SULFATE IN WATER 40 MG/ML
2000 INJECTION, SOLUTION INTRAVENOUS PRN
Status: DISCONTINUED | OUTPATIENT
Start: 2024-09-07 | End: 2024-09-10 | Stop reason: HOSPADM

## 2024-09-07 RX ORDER — BISACODYL 10 MG
10 SUPPOSITORY, RECTAL RECTAL DAILY PRN
Status: DISCONTINUED | OUTPATIENT
Start: 2024-09-07 | End: 2024-09-07

## 2024-09-07 RX ORDER — SODIUM CHLORIDE 9 MG/ML
INJECTION, SOLUTION INTRAVENOUS PRN
Status: DISCONTINUED | OUTPATIENT
Start: 2024-09-07 | End: 2024-09-10 | Stop reason: HOSPADM

## 2024-09-07 RX ORDER — HYDROXYCHLOROQUINE SULFATE 200 MG/1
200 TABLET, FILM COATED ORAL DAILY
Status: DISCONTINUED | OUTPATIENT
Start: 2024-09-08 | End: 2024-09-10 | Stop reason: HOSPADM

## 2024-09-07 RX ORDER — ONDANSETRON 2 MG/ML
4 INJECTION INTRAMUSCULAR; INTRAVENOUS
Status: COMPLETED | OUTPATIENT
Start: 2024-09-07 | End: 2024-09-07

## 2024-09-07 RX ORDER — SODIUM CHLORIDE 0.9 % (FLUSH) 0.9 %
5-40 SYRINGE (ML) INJECTION EVERY 12 HOURS SCHEDULED
Status: DISCONTINUED | OUTPATIENT
Start: 2024-09-07 | End: 2024-09-10 | Stop reason: HOSPADM

## 2024-09-07 RX ORDER — ASPIRIN 81 MG/1
81 TABLET, CHEWABLE ORAL DAILY
Status: DISCONTINUED | OUTPATIENT
Start: 2024-09-08 | End: 2024-09-10 | Stop reason: HOSPADM

## 2024-09-07 RX ORDER — ACETAMINOPHEN 325 MG/1
650 TABLET ORAL EVERY 6 HOURS PRN
Status: DISCONTINUED | OUTPATIENT
Start: 2024-09-07 | End: 2024-09-08

## 2024-09-07 RX ADMIN — DIPHENHYDRAMINE HYDROCHLORIDE 10 MG: 50 INJECTION INTRAMUSCULAR; INTRAVENOUS at 22:00

## 2024-09-07 RX ADMIN — IOPAMIDOL 100 ML: 612 INJECTION, SOLUTION INTRAVENOUS at 17:39

## 2024-09-07 RX ADMIN — ONDANSETRON 4 MG: 2 INJECTION INTRAMUSCULAR; INTRAVENOUS at 13:39

## 2024-09-07 RX ADMIN — HYDROMORPHONE HYDROCHLORIDE 0.5 MG: 1 INJECTION, SOLUTION INTRAMUSCULAR; INTRAVENOUS; SUBCUTANEOUS at 15:43

## 2024-09-07 RX ADMIN — HYDROMORPHONE HYDROCHLORIDE 0.5 MG: 1 INJECTION, SOLUTION INTRAMUSCULAR; INTRAVENOUS; SUBCUTANEOUS at 13:39

## 2024-09-07 ASSESSMENT — PAIN DESCRIPTION - LOCATION
LOCATION: ABDOMEN
LOCATION: ABDOMEN

## 2024-09-07 ASSESSMENT — PAIN SCALES - GENERAL
PAINLEVEL_OUTOF10: 10

## 2024-09-07 ASSESSMENT — PAIN DESCRIPTION - DESCRIPTORS: DESCRIPTORS: ACHING;SHARP

## 2024-09-07 ASSESSMENT — PAIN DESCRIPTION - ORIENTATION: ORIENTATION: MID;LOWER

## 2024-09-07 ASSESSMENT — PAIN - FUNCTIONAL ASSESSMENT: PAIN_FUNCTIONAL_ASSESSMENT: 0-10

## 2024-09-08 ENCOUNTER — APPOINTMENT (OUTPATIENT)
Facility: HOSPITAL | Age: 64
DRG: 389 | End: 2024-09-08
Payer: COMMERCIAL

## 2024-09-08 LAB
ANION GAP SERPL CALC-SCNC: 6 MMOL/L (ref 3–18)
BASOPHILS # BLD: 0 K/UL (ref 0–0.1)
BASOPHILS NFR BLD: 1 % (ref 0–2)
BUN SERPL-MCNC: 22 MG/DL (ref 7–18)
BUN/CREAT SERPL: 19 (ref 12–20)
CALCIUM SERPL-MCNC: 8.9 MG/DL (ref 8.5–10.1)
CHLORIDE SERPL-SCNC: 104 MMOL/L (ref 100–111)
CO2 SERPL-SCNC: 26 MMOL/L (ref 21–32)
CREAT SERPL-MCNC: 1.13 MG/DL (ref 0.6–1.3)
DIFFERENTIAL METHOD BLD: ABNORMAL
EOSINOPHIL # BLD: 0.1 K/UL (ref 0–0.4)
EOSINOPHIL NFR BLD: 2 % (ref 0–5)
ERYTHROCYTE [DISTWIDTH] IN BLOOD BY AUTOMATED COUNT: 13.1 % (ref 11.6–14.5)
EST. AVERAGE GLUCOSE BLD GHB EST-MCNC: 229 MG/DL
GLUCOSE BLD STRIP.AUTO-MCNC: 127 MG/DL (ref 70–110)
GLUCOSE BLD STRIP.AUTO-MCNC: 171 MG/DL (ref 70–110)
GLUCOSE BLD STRIP.AUTO-MCNC: 85 MG/DL (ref 70–110)
GLUCOSE BLD STRIP.AUTO-MCNC: 93 MG/DL (ref 70–110)
GLUCOSE SERPL-MCNC: 144 MG/DL (ref 74–99)
HBA1C MFR BLD: 9.6 % (ref 4.2–5.6)
HCT VFR BLD AUTO: 44.4 % (ref 35–45)
HGB BLD-MCNC: 14.3 G/DL (ref 12–16)
IMM GRANULOCYTES # BLD AUTO: 0 K/UL (ref 0–0.04)
IMM GRANULOCYTES NFR BLD AUTO: 0 % (ref 0–0.5)
LYMPHOCYTES # BLD: 1.4 K/UL (ref 0.9–3.6)
LYMPHOCYTES NFR BLD: 20 % (ref 21–52)
MCH RBC QN AUTO: 29.5 PG (ref 24–34)
MCHC RBC AUTO-ENTMCNC: 32.2 G/DL (ref 31–37)
MCV RBC AUTO: 91.7 FL (ref 78–100)
MONOCYTES # BLD: 0.5 K/UL (ref 0.05–1.2)
MONOCYTES NFR BLD: 7 % (ref 3–10)
NEUTS SEG # BLD: 4.8 K/UL (ref 1.8–8)
NEUTS SEG NFR BLD: 70 % (ref 40–73)
NRBC # BLD: 0 K/UL (ref 0–0.01)
NRBC BLD-RTO: 0 PER 100 WBC
PLATELET # BLD AUTO: 234 K/UL (ref 135–420)
PMV BLD AUTO: 10.8 FL (ref 9.2–11.8)
POTASSIUM SERPL-SCNC: 4.3 MMOL/L (ref 3.5–5.5)
RBC # BLD AUTO: 4.84 M/UL (ref 4.2–5.3)
SODIUM SERPL-SCNC: 136 MMOL/L (ref 136–145)
WBC # BLD AUTO: 6.8 K/UL (ref 4.6–13.2)

## 2024-09-08 PROCEDURE — 6370000000 HC RX 637 (ALT 250 FOR IP): Performed by: STUDENT IN AN ORGANIZED HEALTH CARE EDUCATION/TRAINING PROGRAM

## 2024-09-08 PROCEDURE — 99254 IP/OBS CNSLTJ NEW/EST MOD 60: CPT | Performed by: SURGERY

## 2024-09-08 PROCEDURE — 83036 HEMOGLOBIN GLYCOSYLATED A1C: CPT

## 2024-09-08 PROCEDURE — 2580000003 HC RX 258: Performed by: PHYSICIAN ASSISTANT

## 2024-09-08 PROCEDURE — 6360000002 HC RX W HCPCS: Performed by: SURGERY

## 2024-09-08 PROCEDURE — 74018 RADEX ABDOMEN 1 VIEW: CPT

## 2024-09-08 PROCEDURE — 6360000002 HC RX W HCPCS: Performed by: INTERNAL MEDICINE

## 2024-09-08 PROCEDURE — 6360000002 HC RX W HCPCS: Performed by: PHYSICIAN ASSISTANT

## 2024-09-08 PROCEDURE — 99222 1ST HOSP IP/OBS MODERATE 55: CPT | Performed by: SURGERY

## 2024-09-08 PROCEDURE — 82962 GLUCOSE BLOOD TEST: CPT

## 2024-09-08 PROCEDURE — 6360000002 HC RX W HCPCS: Performed by: STUDENT IN AN ORGANIZED HEALTH CARE EDUCATION/TRAINING PROGRAM

## 2024-09-08 PROCEDURE — 36415 COLL VENOUS BLD VENIPUNCTURE: CPT

## 2024-09-08 PROCEDURE — 99232 SBSQ HOSP IP/OBS MODERATE 35: CPT | Performed by: STUDENT IN AN ORGANIZED HEALTH CARE EDUCATION/TRAINING PROGRAM

## 2024-09-08 PROCEDURE — 1100000000 HC RM PRIVATE

## 2024-09-08 PROCEDURE — 80048 BASIC METABOLIC PNL TOTAL CA: CPT

## 2024-09-08 PROCEDURE — 85025 COMPLETE CBC W/AUTO DIFF WBC: CPT

## 2024-09-08 RX ORDER — NALOXONE HYDROCHLORIDE 0.4 MG/ML
0.4 INJECTION, SOLUTION INTRAMUSCULAR; INTRAVENOUS; SUBCUTANEOUS PRN
Status: DISCONTINUED | OUTPATIENT
Start: 2024-09-08 | End: 2024-09-10 | Stop reason: HOSPADM

## 2024-09-08 RX ORDER — IPRATROPIUM BROMIDE AND ALBUTEROL SULFATE 2.5; .5 MG/3ML; MG/3ML
1 SOLUTION RESPIRATORY (INHALATION) EVERY 4 HOURS PRN
Status: DISCONTINUED | OUTPATIENT
Start: 2024-09-08 | End: 2024-09-10 | Stop reason: HOSPADM

## 2024-09-08 RX ORDER — KETOROLAC TROMETHAMINE 15 MG/ML
15 INJECTION, SOLUTION INTRAMUSCULAR; INTRAVENOUS EVERY 6 HOURS PRN
Status: COMPLETED | OUTPATIENT
Start: 2024-09-08 | End: 2024-09-09

## 2024-09-08 RX ORDER — DIPHENHYDRAMINE HYDROCHLORIDE 50 MG/ML
25 INJECTION INTRAMUSCULAR; INTRAVENOUS EVERY 6 HOURS PRN
Status: COMPLETED | OUTPATIENT
Start: 2024-09-08 | End: 2024-09-08

## 2024-09-08 RX ORDER — KETOROLAC TROMETHAMINE 15 MG/ML
15 INJECTION, SOLUTION INTRAMUSCULAR; INTRAVENOUS
Status: COMPLETED | OUTPATIENT
Start: 2024-09-08 | End: 2024-09-08

## 2024-09-08 RX ORDER — DIPHENHYDRAMINE HYDROCHLORIDE 50 MG/ML
25 INJECTION INTRAMUSCULAR; INTRAVENOUS
Status: COMPLETED | OUTPATIENT
Start: 2024-09-08 | End: 2024-09-08

## 2024-09-08 RX ORDER — MECOBALAMIN 5000 MCG
5 TABLET,DISINTEGRATING ORAL NIGHTLY PRN
Status: DISCONTINUED | OUTPATIENT
Start: 2024-09-08 | End: 2024-09-10 | Stop reason: HOSPADM

## 2024-09-08 RX ORDER — ACETAMINOPHEN 500 MG
1000 TABLET ORAL EVERY 8 HOURS SCHEDULED
Status: COMPLETED | OUTPATIENT
Start: 2024-09-08 | End: 2024-09-10

## 2024-09-08 RX ADMIN — ACETAMINOPHEN 1000 MG: 500 TABLET ORAL at 14:41

## 2024-09-08 RX ADMIN — SODIUM CHLORIDE: 9 INJECTION, SOLUTION INTRAVENOUS at 00:47

## 2024-09-08 RX ADMIN — DIPHENHYDRAMINE HYDROCHLORIDE 25 MG: 50 INJECTION INTRAMUSCULAR; INTRAVENOUS at 04:16

## 2024-09-08 RX ADMIN — HYDROMORPHONE HYDROCHLORIDE 0.5 MG: 1 INJECTION, SOLUTION INTRAMUSCULAR; INTRAVENOUS; SUBCUTANEOUS at 04:16

## 2024-09-08 RX ADMIN — KETOROLAC TROMETHAMINE 15 MG: 15 INJECTION, SOLUTION INTRAMUSCULAR; INTRAVENOUS at 17:08

## 2024-09-08 RX ADMIN — HYDROMORPHONE HYDROCHLORIDE 0.25 MG: 1 INJECTION, SOLUTION INTRAMUSCULAR; INTRAVENOUS; SUBCUTANEOUS at 10:14

## 2024-09-08 RX ADMIN — SODIUM CHLORIDE: 9 INJECTION, SOLUTION INTRAVENOUS at 14:24

## 2024-09-08 RX ADMIN — KETOROLAC TROMETHAMINE 15 MG: 15 INJECTION, SOLUTION INTRAMUSCULAR; INTRAVENOUS at 23:38

## 2024-09-08 RX ADMIN — DIPHENHYDRAMINE HYDROCHLORIDE 25 MG: 50 INJECTION INTRAMUSCULAR; INTRAVENOUS at 21:13

## 2024-09-08 RX ADMIN — SODIUM CHLORIDE, PRESERVATIVE FREE 10 ML: 5 INJECTION INTRAVENOUS at 08:34

## 2024-09-08 RX ADMIN — KETOROLAC TROMETHAMINE 15 MG: 15 INJECTION, SOLUTION INTRAMUSCULAR; INTRAVENOUS at 10:16

## 2024-09-08 RX ADMIN — ENOXAPARIN SODIUM 40 MG: 100 INJECTION SUBCUTANEOUS at 08:30

## 2024-09-08 RX ADMIN — ACETAMINOPHEN 1000 MG: 500 TABLET ORAL at 21:07

## 2024-09-08 RX ADMIN — SODIUM CHLORIDE, PRESERVATIVE FREE 10 ML: 5 INJECTION INTRAVENOUS at 04:17

## 2024-09-08 RX ADMIN — SODIUM CHLORIDE, PRESERVATIVE FREE 10 ML: 5 INJECTION INTRAVENOUS at 00:48

## 2024-09-08 RX ADMIN — DIPHENHYDRAMINE HYDROCHLORIDE 25 MG: 50 INJECTION INTRAMUSCULAR; INTRAVENOUS at 13:19

## 2024-09-08 ASSESSMENT — PAIN SCALES - GENERAL
PAINLEVEL_OUTOF10: 0
PAINLEVEL_OUTOF10: 9
PAINLEVEL_OUTOF10: 6
PAINLEVEL_OUTOF10: 4
PAINLEVEL_OUTOF10: 10
PAINLEVEL_OUTOF10: 2
PAINLEVEL_OUTOF10: 0
PAINLEVEL_OUTOF10: 6

## 2024-09-08 ASSESSMENT — PAIN - FUNCTIONAL ASSESSMENT
PAIN_FUNCTIONAL_ASSESSMENT: ACTIVITIES ARE NOT PREVENTED

## 2024-09-08 ASSESSMENT — PAIN DESCRIPTION - ORIENTATION
ORIENTATION: MID
ORIENTATION: ANTERIOR
ORIENTATION: MID
ORIENTATION: RIGHT
ORIENTATION: ANTERIOR
ORIENTATION: RIGHT

## 2024-09-08 ASSESSMENT — PAIN DESCRIPTION - PAIN TYPE
TYPE: ACUTE PAIN

## 2024-09-08 ASSESSMENT — PAIN DESCRIPTION - FREQUENCY
FREQUENCY: INTERMITTENT

## 2024-09-08 ASSESSMENT — PAIN DESCRIPTION - DESCRIPTORS
DESCRIPTORS: ACHING
DESCRIPTORS: GNAWING
DESCRIPTORS: ACHING

## 2024-09-08 ASSESSMENT — PAIN DESCRIPTION - LOCATION
LOCATION: ABDOMEN

## 2024-09-08 ASSESSMENT — PAIN DESCRIPTION - ONSET
ONSET: ON-GOING
ONSET: ON-GOING

## 2024-09-08 ASSESSMENT — PAIN SCALES - WONG BAKER: WONGBAKER_NUMERICALRESPONSE: NO HURT

## 2024-09-09 ENCOUNTER — APPOINTMENT (OUTPATIENT)
Facility: HOSPITAL | Age: 64
DRG: 389 | End: 2024-09-09
Payer: COMMERCIAL

## 2024-09-09 LAB
ANION GAP SERPL CALC-SCNC: 6 MMOL/L (ref 3–18)
BASOPHILS # BLD: 0 K/UL (ref 0–0.1)
BASOPHILS NFR BLD: 1 % (ref 0–2)
BUN SERPL-MCNC: 29 MG/DL (ref 7–18)
BUN/CREAT SERPL: 27 (ref 12–20)
CALCIUM SERPL-MCNC: 8.5 MG/DL (ref 8.5–10.1)
CHLORIDE SERPL-SCNC: 109 MMOL/L (ref 100–111)
CO2 SERPL-SCNC: 24 MMOL/L (ref 21–32)
CREAT SERPL-MCNC: 1.06 MG/DL (ref 0.6–1.3)
DIFFERENTIAL METHOD BLD: NORMAL
EOSINOPHIL # BLD: 0.2 K/UL (ref 0–0.4)
EOSINOPHIL NFR BLD: 3 % (ref 0–5)
ERYTHROCYTE [DISTWIDTH] IN BLOOD BY AUTOMATED COUNT: 12.9 % (ref 11.6–14.5)
GLUCOSE BLD STRIP.AUTO-MCNC: 167 MG/DL (ref 70–110)
GLUCOSE BLD STRIP.AUTO-MCNC: 247 MG/DL (ref 70–110)
GLUCOSE BLD STRIP.AUTO-MCNC: 79 MG/DL (ref 70–110)
GLUCOSE BLD STRIP.AUTO-MCNC: 83 MG/DL (ref 70–110)
GLUCOSE SERPL-MCNC: 87 MG/DL (ref 74–99)
HCT VFR BLD AUTO: 40 % (ref 35–45)
HGB BLD-MCNC: 12.9 G/DL (ref 12–16)
IMM GRANULOCYTES # BLD AUTO: 0 K/UL (ref 0–0.04)
IMM GRANULOCYTES NFR BLD AUTO: 0 % (ref 0–0.5)
LYMPHOCYTES # BLD: 1.4 K/UL (ref 0.9–3.6)
LYMPHOCYTES NFR BLD: 23 % (ref 21–52)
MCH RBC QN AUTO: 29.5 PG (ref 24–34)
MCHC RBC AUTO-ENTMCNC: 32.3 G/DL (ref 31–37)
MCV RBC AUTO: 91.3 FL (ref 78–100)
MONOCYTES # BLD: 0.6 K/UL (ref 0.05–1.2)
MONOCYTES NFR BLD: 9 % (ref 3–10)
NEUTS SEG # BLD: 4.1 K/UL (ref 1.8–8)
NEUTS SEG NFR BLD: 64 % (ref 40–73)
NRBC # BLD: 0 K/UL (ref 0–0.01)
NRBC BLD-RTO: 0 PER 100 WBC
PLATELET # BLD AUTO: 212 K/UL (ref 135–420)
PMV BLD AUTO: 10.6 FL (ref 9.2–11.8)
POTASSIUM SERPL-SCNC: 3.7 MMOL/L (ref 3.5–5.5)
RBC # BLD AUTO: 4.38 M/UL (ref 4.2–5.3)
SODIUM SERPL-SCNC: 139 MMOL/L (ref 136–145)
WBC # BLD AUTO: 6.3 K/UL (ref 4.6–13.2)

## 2024-09-09 PROCEDURE — 6360000004 HC RX CONTRAST MEDICATION: Performed by: SURGERY

## 2024-09-09 PROCEDURE — 6360000002 HC RX W HCPCS: Performed by: HOSPITALIST

## 2024-09-09 PROCEDURE — 2580000003 HC RX 258: Performed by: PHYSICIAN ASSISTANT

## 2024-09-09 PROCEDURE — 82962 GLUCOSE BLOOD TEST: CPT

## 2024-09-09 PROCEDURE — 6360000002 HC RX W HCPCS: Performed by: STUDENT IN AN ORGANIZED HEALTH CARE EDUCATION/TRAINING PROGRAM

## 2024-09-09 PROCEDURE — 36415 COLL VENOUS BLD VENIPUNCTURE: CPT

## 2024-09-09 PROCEDURE — 6370000000 HC RX 637 (ALT 250 FOR IP): Performed by: STUDENT IN AN ORGANIZED HEALTH CARE EDUCATION/TRAINING PROGRAM

## 2024-09-09 PROCEDURE — 6360000002 HC RX W HCPCS: Performed by: PHYSICIAN ASSISTANT

## 2024-09-09 PROCEDURE — 74251 X-RAY XM SM INT 2CNTRST STD: CPT

## 2024-09-09 PROCEDURE — 6370000000 HC RX 637 (ALT 250 FOR IP): Performed by: SURGERY

## 2024-09-09 PROCEDURE — 80048 BASIC METABOLIC PNL TOTAL CA: CPT

## 2024-09-09 PROCEDURE — 1100000000 HC RM PRIVATE

## 2024-09-09 PROCEDURE — 85025 COMPLETE CBC W/AUTO DIFF WBC: CPT

## 2024-09-09 PROCEDURE — 6370000000 HC RX 637 (ALT 250 FOR IP): Performed by: PHYSICIAN ASSISTANT

## 2024-09-09 PROCEDURE — 6360000002 HC RX W HCPCS: Performed by: INTERNAL MEDICINE

## 2024-09-09 PROCEDURE — 99232 SBSQ HOSP IP/OBS MODERATE 35: CPT | Performed by: STUDENT IN AN ORGANIZED HEALTH CARE EDUCATION/TRAINING PROGRAM

## 2024-09-09 RX ORDER — GABAPENTIN 300 MG/1
300 CAPSULE ORAL 3 TIMES DAILY
Status: DISCONTINUED | OUTPATIENT
Start: 2024-09-09 | End: 2024-09-10 | Stop reason: HOSPADM

## 2024-09-09 RX ORDER — DIATRIZOATE MEGLUMINE AND DIATRIZOATE SODIUM 660; 100 MG/ML; MG/ML
240 SOLUTION ORAL; RECTAL
Status: DISCONTINUED | OUTPATIENT
Start: 2024-09-09 | End: 2024-09-10 | Stop reason: HOSPADM

## 2024-09-09 RX ORDER — DIPHENHYDRAMINE HCL 25 MG
50 CAPSULE ORAL EVERY 4 HOURS PRN
Status: DISCONTINUED | OUTPATIENT
Start: 2024-09-09 | End: 2024-09-10 | Stop reason: HOSPADM

## 2024-09-09 RX ORDER — DIPHENHYDRAMINE HYDROCHLORIDE 50 MG/ML
25 INJECTION INTRAMUSCULAR; INTRAVENOUS ONCE
Status: COMPLETED | OUTPATIENT
Start: 2024-09-09 | End: 2024-09-09

## 2024-09-09 RX ADMIN — GABAPENTIN 300 MG: 300 CAPSULE ORAL at 15:13

## 2024-09-09 RX ADMIN — DIATRIZOATE MEGLUMINE AND DIATRIZOATE SODIUM 240 ML: 660; 100 LIQUID ORAL; RECTAL at 09:38

## 2024-09-09 RX ADMIN — SODIUM CHLORIDE, PRESERVATIVE FREE 10 ML: 5 INJECTION INTRAVENOUS at 08:00

## 2024-09-09 RX ADMIN — DIPHENHYDRAMINE HYDROCHLORIDE 25 MG: 50 INJECTION INTRAMUSCULAR; INTRAVENOUS at 21:22

## 2024-09-09 RX ADMIN — ACETAMINOPHEN 1000 MG: 500 TABLET ORAL at 21:22

## 2024-09-09 RX ADMIN — SODIUM CHLORIDE: 9 INJECTION, SOLUTION INTRAVENOUS at 17:28

## 2024-09-09 RX ADMIN — ACETAMINOPHEN 1000 MG: 500 TABLET ORAL at 15:13

## 2024-09-09 RX ADMIN — SODIUM CHLORIDE, PRESERVATIVE FREE 10 ML: 5 INJECTION INTRAVENOUS at 21:22

## 2024-09-09 RX ADMIN — INSULIN LISPRO 1 UNITS: 100 INJECTION, SOLUTION INTRAVENOUS; SUBCUTANEOUS at 17:28

## 2024-09-09 RX ADMIN — HYDROMORPHONE HYDROCHLORIDE 0.25 MG: 1 INJECTION, SOLUTION INTRAMUSCULAR; INTRAVENOUS; SUBCUTANEOUS at 00:26

## 2024-09-09 RX ADMIN — ACETAMINOPHEN 1000 MG: 500 TABLET ORAL at 05:09

## 2024-09-09 RX ADMIN — ENOXAPARIN SODIUM 40 MG: 100 INJECTION SUBCUTANEOUS at 07:59

## 2024-09-09 RX ADMIN — SODIUM CHLORIDE: 9 INJECTION, SOLUTION INTRAVENOUS at 03:08

## 2024-09-09 RX ADMIN — DIPHENHYDRAMINE HYDROCHLORIDE 50 MG: 25 CAPSULE ORAL at 15:21

## 2024-09-09 RX ADMIN — GABAPENTIN 300 MG: 300 CAPSULE ORAL at 21:22

## 2024-09-09 RX ADMIN — KETOROLAC TROMETHAMINE 15 MG: 15 INJECTION, SOLUTION INTRAMUSCULAR; INTRAVENOUS at 21:21

## 2024-09-09 RX ADMIN — KETOROLAC TROMETHAMINE 15 MG: 15 INJECTION, SOLUTION INTRAMUSCULAR; INTRAVENOUS at 07:59

## 2024-09-09 RX ADMIN — PHENOL 1 SPRAY: 1.5 LIQUID ORAL at 05:09

## 2024-09-09 ASSESSMENT — PAIN DESCRIPTION - FREQUENCY
FREQUENCY: INTERMITTENT
FREQUENCY: INTERMITTENT

## 2024-09-09 ASSESSMENT — PAIN SCALES - GENERAL
PAINLEVEL_OUTOF10: 9
PAINLEVEL_OUTOF10: 0
PAINLEVEL_OUTOF10: 7
PAINLEVEL_OUTOF10: 10
PAINLEVEL_OUTOF10: 0

## 2024-09-09 ASSESSMENT — PAIN DESCRIPTION - PAIN TYPE
TYPE: ACUTE PAIN

## 2024-09-09 ASSESSMENT — PAIN DESCRIPTION - DESCRIPTORS
DESCRIPTORS: ACHING;BURNING
DESCRIPTORS: ACHING;JABBING
DESCRIPTORS: STABBING

## 2024-09-09 ASSESSMENT — PAIN DESCRIPTION - LOCATION
LOCATION: ABDOMEN

## 2024-09-09 ASSESSMENT — PAIN - FUNCTIONAL ASSESSMENT
PAIN_FUNCTIONAL_ASSESSMENT: ACTIVITIES ARE NOT PREVENTED

## 2024-09-09 ASSESSMENT — PAIN DESCRIPTION - ONSET: ONSET: ON-GOING

## 2024-09-09 ASSESSMENT — PAIN SCALES - WONG BAKER
WONGBAKER_NUMERICALRESPONSE: NO HURT
WONGBAKER_NUMERICALRESPONSE: HURTS A LITTLE BIT
WONGBAKER_NUMERICALRESPONSE: NO HURT

## 2024-09-09 ASSESSMENT — PAIN DESCRIPTION - ORIENTATION
ORIENTATION: LOWER
ORIENTATION: MID
ORIENTATION: ANTERIOR

## 2024-09-10 VITALS
DIASTOLIC BLOOD PRESSURE: 86 MMHG | SYSTOLIC BLOOD PRESSURE: 136 MMHG | HEART RATE: 90 BPM | WEIGHT: 155 LBS | HEIGHT: 64 IN | OXYGEN SATURATION: 100 % | RESPIRATION RATE: 16 BRPM | BODY MASS INDEX: 26.46 KG/M2 | TEMPERATURE: 98 F

## 2024-09-10 LAB
ANION GAP SERPL CALC-SCNC: 3 MMOL/L (ref 3–18)
BASOPHILS # BLD: 0 K/UL (ref 0–0.1)
BASOPHILS NFR BLD: 1 % (ref 0–2)
BUN SERPL-MCNC: 22 MG/DL (ref 7–18)
BUN/CREAT SERPL: 19 (ref 12–20)
CALCIUM SERPL-MCNC: 8.5 MG/DL (ref 8.5–10.1)
CHLORIDE SERPL-SCNC: 108 MMOL/L (ref 100–111)
CO2 SERPL-SCNC: 26 MMOL/L (ref 21–32)
CREAT SERPL-MCNC: 1.13 MG/DL (ref 0.6–1.3)
DIFFERENTIAL METHOD BLD: NORMAL
EOSINOPHIL # BLD: 0.2 K/UL (ref 0–0.4)
EOSINOPHIL NFR BLD: 3 % (ref 0–5)
ERYTHROCYTE [DISTWIDTH] IN BLOOD BY AUTOMATED COUNT: 12.9 % (ref 11.6–14.5)
GLUCOSE BLD STRIP.AUTO-MCNC: 293 MG/DL (ref 70–110)
GLUCOSE SERPL-MCNC: 266 MG/DL (ref 74–99)
HCT VFR BLD AUTO: 40.4 % (ref 35–45)
HGB BLD-MCNC: 13 G/DL (ref 12–16)
IMM GRANULOCYTES # BLD AUTO: 0 K/UL (ref 0–0.04)
IMM GRANULOCYTES NFR BLD AUTO: 0 % (ref 0–0.5)
LYMPHOCYTES # BLD: 1.4 K/UL (ref 0.9–3.6)
LYMPHOCYTES NFR BLD: 28 % (ref 21–52)
MCH RBC QN AUTO: 29.7 PG (ref 24–34)
MCHC RBC AUTO-ENTMCNC: 32.2 G/DL (ref 31–37)
MCV RBC AUTO: 92.2 FL (ref 78–100)
MONOCYTES # BLD: 0.5 K/UL (ref 0.05–1.2)
MONOCYTES NFR BLD: 10 % (ref 3–10)
NEUTS SEG # BLD: 2.9 K/UL (ref 1.8–8)
NEUTS SEG NFR BLD: 58 % (ref 40–73)
NRBC # BLD: 0 K/UL (ref 0–0.01)
NRBC BLD-RTO: 0 PER 100 WBC
PLATELET # BLD AUTO: 202 K/UL (ref 135–420)
PMV BLD AUTO: 10.7 FL (ref 9.2–11.8)
POTASSIUM SERPL-SCNC: 4.4 MMOL/L (ref 3.5–5.5)
RBC # BLD AUTO: 4.38 M/UL (ref 4.2–5.3)
SODIUM SERPL-SCNC: 137 MMOL/L (ref 136–145)
WBC # BLD AUTO: 5 K/UL (ref 4.6–13.2)

## 2024-09-10 PROCEDURE — 85025 COMPLETE CBC W/AUTO DIFF WBC: CPT

## 2024-09-10 PROCEDURE — 6370000000 HC RX 637 (ALT 250 FOR IP): Performed by: PHYSICIAN ASSISTANT

## 2024-09-10 PROCEDURE — 6360000002 HC RX W HCPCS: Performed by: STUDENT IN AN ORGANIZED HEALTH CARE EDUCATION/TRAINING PROGRAM

## 2024-09-10 PROCEDURE — 82962 GLUCOSE BLOOD TEST: CPT

## 2024-09-10 PROCEDURE — 6360000002 HC RX W HCPCS: Performed by: PHYSICIAN ASSISTANT

## 2024-09-10 PROCEDURE — 94760 N-INVAS EAR/PLS OXIMETRY 1: CPT

## 2024-09-10 PROCEDURE — 6370000000 HC RX 637 (ALT 250 FOR IP): Performed by: STUDENT IN AN ORGANIZED HEALTH CARE EDUCATION/TRAINING PROGRAM

## 2024-09-10 PROCEDURE — 36415 COLL VENOUS BLD VENIPUNCTURE: CPT

## 2024-09-10 PROCEDURE — 80048 BASIC METABOLIC PNL TOTAL CA: CPT

## 2024-09-10 PROCEDURE — 99233 SBSQ HOSP IP/OBS HIGH 50: CPT | Performed by: SURGERY

## 2024-09-10 PROCEDURE — 6370000000 HC RX 637 (ALT 250 FOR IP): Performed by: SURGERY

## 2024-09-10 RX ORDER — DIPHENHYDRAMINE HYDROCHLORIDE 50 MG/ML
25 INJECTION INTRAMUSCULAR; INTRAVENOUS ONCE
Status: COMPLETED | OUTPATIENT
Start: 2024-09-10 | End: 2024-09-10

## 2024-09-10 RX ORDER — GABAPENTIN 300 MG/1
300 CAPSULE ORAL 3 TIMES DAILY
Qty: 90 CAPSULE | Refills: 0 | Status: SHIPPED | OUTPATIENT
Start: 2024-09-10 | End: 2024-10-10

## 2024-09-10 RX ADMIN — HYDROXYCHLOROQUINE SULFATE 200 MG: 200 TABLET ORAL at 08:45

## 2024-09-10 RX ADMIN — ATORVASTATIN CALCIUM 40 MG: 40 TABLET, FILM COATED ORAL at 08:45

## 2024-09-10 RX ADMIN — ENOXAPARIN SODIUM 40 MG: 100 INJECTION SUBCUTANEOUS at 08:45

## 2024-09-10 RX ADMIN — ASPIRIN 81 MG CHEWABLE TABLET 81 MG: 81 TABLET CHEWABLE at 08:45

## 2024-09-10 RX ADMIN — GABAPENTIN 300 MG: 300 CAPSULE ORAL at 08:45

## 2024-09-10 RX ADMIN — ACETAMINOPHEN 1000 MG: 500 TABLET ORAL at 06:27

## 2024-09-10 RX ADMIN — DIPHENHYDRAMINE HYDROCHLORIDE 25 MG: 50 INJECTION INTRAMUSCULAR; INTRAVENOUS at 09:17

## 2024-09-10 RX ADMIN — INSULIN LISPRO 2 UNITS: 100 INJECTION, SOLUTION INTRAVENOUS; SUBCUTANEOUS at 08:44

## 2024-09-10 RX ADMIN — CLOPIDOGREL BISULFATE 75 MG: 75 TABLET ORAL at 08:45

## 2024-09-10 RX ADMIN — VENLAFAXINE HYDROCHLORIDE 37.5 MG: 37.5 CAPSULE, EXTENDED RELEASE ORAL at 08:45

## 2024-09-11 ENCOUNTER — CLINICAL DOCUMENTATION (OUTPATIENT)
Facility: CLINIC | Age: 64
End: 2024-09-11

## 2024-10-17 ENCOUNTER — TELEPHONE (OUTPATIENT)
Facility: CLINIC | Age: 64
End: 2024-10-17

## 2024-10-17 NOTE — TELEPHONE ENCOUNTER
Pt called stating Dr. Gupta told her her she could come in and  samples of Nurtex.  Per nurse Janette, advised pt she can come  samples this morning.    Please bring samples to the reception desk, so ready when pt arrives.    Thank you.

## 2024-11-20 ENCOUNTER — OFFICE VISIT (OUTPATIENT)
Facility: CLINIC | Age: 64
End: 2024-11-20

## 2024-11-20 VITALS
WEIGHT: 156.4 LBS | DIASTOLIC BLOOD PRESSURE: 76 MMHG | HEART RATE: 111 BPM | OXYGEN SATURATION: 97 % | RESPIRATION RATE: 17 BRPM | SYSTOLIC BLOOD PRESSURE: 141 MMHG | BODY MASS INDEX: 26.7 KG/M2 | HEIGHT: 64 IN | TEMPERATURE: 97.6 F

## 2024-11-20 DIAGNOSIS — Z09 HOSPITAL DISCHARGE FOLLOW-UP: Primary | ICD-10-CM

## 2024-11-20 DIAGNOSIS — J11.1 INFLUENZA: ICD-10-CM

## 2024-11-20 DIAGNOSIS — E11.21 TYPE 2 DIABETES MELLITUS WITH NEPHROPATHY (HCC): ICD-10-CM

## 2024-11-20 RX ORDER — OSELTAMIVIR PHOSPHATE 75 MG/1
75 CAPSULE ORAL 2 TIMES DAILY
Qty: 10 CAPSULE | Refills: 0 | Status: SHIPPED | OUTPATIENT
Start: 2024-11-20 | End: 2024-11-25

## 2024-11-20 RX ORDER — HYDROCODONE POLISTIREX AND CHLORPHENIRAMINE POLISTIREX 10; 8 MG/5ML; MG/5ML
5 SUSPENSION, EXTENDED RELEASE ORAL EVERY 12 HOURS PRN
Qty: 70 ML | Refills: 0 | Status: SHIPPED | OUTPATIENT
Start: 2024-11-20 | End: 2024-11-27

## 2024-11-20 SDOH — ECONOMIC STABILITY: INCOME INSECURITY: HOW HARD IS IT FOR YOU TO PAY FOR THE VERY BASICS LIKE FOOD, HOUSING, MEDICAL CARE, AND HEATING?: NOT VERY HARD

## 2024-11-20 SDOH — ECONOMIC STABILITY: FOOD INSECURITY: WITHIN THE PAST 12 MONTHS, YOU WORRIED THAT YOUR FOOD WOULD RUN OUT BEFORE YOU GOT MONEY TO BUY MORE.: NEVER TRUE

## 2024-11-20 SDOH — ECONOMIC STABILITY: FOOD INSECURITY: WITHIN THE PAST 12 MONTHS, THE FOOD YOU BOUGHT JUST DIDN'T LAST AND YOU DIDN'T HAVE MONEY TO GET MORE.: NEVER TRUE

## 2024-11-20 ASSESSMENT — PATIENT HEALTH QUESTIONNAIRE - PHQ9
3. TROUBLE FALLING OR STAYING ASLEEP: NOT AT ALL
10. IF YOU CHECKED OFF ANY PROBLEMS, HOW DIFFICULT HAVE THESE PROBLEMS MADE IT FOR YOU TO DO YOUR WORK, TAKE CARE OF THINGS AT HOME, OR GET ALONG WITH OTHER PEOPLE: NOT DIFFICULT AT ALL
SUM OF ALL RESPONSES TO PHQ QUESTIONS 1-9: 0
2. FEELING DOWN, DEPRESSED OR HOPELESS: NOT AT ALL
1. LITTLE INTEREST OR PLEASURE IN DOING THINGS: NOT AT ALL
7. TROUBLE CONCENTRATING ON THINGS, SUCH AS READING THE NEWSPAPER OR WATCHING TELEVISION: NOT AT ALL
6. FEELING BAD ABOUT YOURSELF - OR THAT YOU ARE A FAILURE OR HAVE LET YOURSELF OR YOUR FAMILY DOWN: NOT AT ALL
9. THOUGHTS THAT YOU WOULD BE BETTER OFF DEAD, OR OF HURTING YOURSELF: NOT AT ALL
SUM OF ALL RESPONSES TO PHQ QUESTIONS 1-9: 0
SUM OF ALL RESPONSES TO PHQ9 QUESTIONS 1 & 2: 0
SUM OF ALL RESPONSES TO PHQ QUESTIONS 1-9: 0
4. FEELING TIRED OR HAVING LITTLE ENERGY: NOT AT ALL
8. MOVING OR SPEAKING SO SLOWLY THAT OTHER PEOPLE COULD HAVE NOTICED. OR THE OPPOSITE, BEING SO FIGETY OR RESTLESS THAT YOU HAVE BEEN MOVING AROUND A LOT MORE THAN USUAL: NOT AT ALL
SUM OF ALL RESPONSES TO PHQ QUESTIONS 1-9: 0
5. POOR APPETITE OR OVEREATING: NOT AT ALL

## 2024-11-20 NOTE — PROGRESS NOTES
Linda Espinal is a 64 y.o.  female and presents with    Chief Complaint   Patient presents with    Follow-Up from Hospital    Discuss Labs    Headache       Subjective:  Cough  Mrs. Espinal is here for ER follow up; she was seen 3 days ago and was diagnosed with flu; she complains of fever, nasal congestion, productive cough, sore throat, and sweats. Symptoms began several days ago. Symptoms have been gradually improving since that time.The cough is productive and is aggravated by nothing. Associated symptoms include: fever. Patient does not have new pets. Patient does have a history of asthma. Patient does not have a history of environmental allergens. Patient has not traveled recently. Patient does not have a history of smoking. Patient has had a previous chest x-ray. Patient has not had a PPD done.    Diabetes Mellitus:  She has diabetes mellitus, and  Hyperlipidemia.   Diabetic ROS - medication compliance: compliant most of the time but she has been out of glp1 due to supply, diabetic diet compliance: noncompliant some of the time.   Lab review: labs reviewed, hgb A1c 10.1 while inpatient     Depression Review:  Patient is seen for followup of depression. Treatment includes effexor and no other therapies.   Ongoing symptoms include depressed mood, irritability, anhedonia, insomnia, fatigue, feelings of worthlessness/guilt, difficulty concentrating and hopelessness.  She denies hypersomnia, impaired memory and recurrent thoughts of death.   She experiences the following side effects from the treatment: none.     Anxiety Review:  Patient is seen for sleep disturbance. Current treatment includes doxepin and no other therapies.   Ongoing symptoms include: insomnia.   Patient denies: palpitations, sweating, chest pain, shortness of breath, dizziness, paresthesias, racing thoughts, psychomotor agitation, feelings of losing control, difficulty concentrating, suicidal ideation.   Reported side

## 2024-11-20 NOTE — PROGRESS NOTES
Linda Espinal is a 64 y.o. year old female who presents today for   Chief Complaint   Patient presents with    Follow-Up from Hospital    Discuss Labs        \"Have you been to the ER, urgent care clinic since your last visit?  Hospitalized since your last visit?\"   YES - When: approximately   ago.  Where and Why:  .     “Have you seen or consulted any other health care providers outside our system since your last visit?”   NO       “Have you had a diabetic eye exam?”    NO     Date of last diabetic eye exam: 5/29/2017           Debra Melchor [facility]  [facility]  [address] [place] [address] [id number]  Ph: [phone]  Direct Fax: [phone]

## 2024-12-06 ENCOUNTER — HOSPITAL ENCOUNTER (OUTPATIENT)
Facility: HOSPITAL | Age: 64
Setting detail: SPECIMEN
Discharge: HOME | End: 2024-12-09
Payer: COMMERCIAL

## 2024-12-06 ENCOUNTER — LAB (OUTPATIENT)
Facility: CLINIC | Age: 64
End: 2024-12-06

## 2024-12-06 DIAGNOSIS — E11.21 TYPE 2 DIABETES MELLITUS WITH NEPHROPATHY (HCC): ICD-10-CM

## 2024-12-06 LAB
EST. AVERAGE GLUCOSE BLD GHB EST-MCNC: 223 MG/DL
HBA1C MFR BLD: 9.4 % (ref 4.2–5.6)

## 2024-12-06 PROCEDURE — 83036 HEMOGLOBIN GLYCOSYLATED A1C: CPT

## 2024-12-06 PROCEDURE — 36415 COLL VENOUS BLD VENIPUNCTURE: CPT

## 2024-12-24 RX ORDER — BISOPROLOL FUMARATE AND HYDROCHLOROTHIAZIDE 2.5; 6.25 MG/1; MG/1
TABLET ORAL
Qty: 90 TABLET | Refills: 3 | Status: SHIPPED | OUTPATIENT
Start: 2024-12-24

## 2024-12-28 DIAGNOSIS — I10 ESSENTIAL HYPERTENSION, BENIGN: ICD-10-CM

## 2024-12-30 RX ORDER — AMLODIPINE BESYLATE 10 MG/1
10 TABLET ORAL DAILY
Qty: 90 TABLET | Refills: 3 | Status: SHIPPED | OUTPATIENT
Start: 2024-12-30

## 2024-12-30 NOTE — TELEPHONE ENCOUNTER
Medication requested :   Requested Prescriptions     Pending Prescriptions Disp Refills    amLODIPine (NORVASC) 10 MG tablet [Pharmacy Med Name: AMLODIPINE BESYLATE 10 MG TAB] 90 tablet 3     Sig: take 1 tablet by mouth once daily      PCP: Nirav Gupta MD  LOV:           11/20/2024 NOV DMA: Visit date not found  FUTURE APPT: No future appointments.    Thank you.

## 2025-02-03 ENCOUNTER — OFFICE VISIT (OUTPATIENT)
Facility: CLINIC | Age: 65
End: 2025-02-03
Payer: COMMERCIAL

## 2025-02-03 VITALS
OXYGEN SATURATION: 97 % | SYSTOLIC BLOOD PRESSURE: 133 MMHG | TEMPERATURE: 97.7 F | BODY MASS INDEX: 26.8 KG/M2 | HEART RATE: 99 BPM | HEIGHT: 64 IN | WEIGHT: 157 LBS | RESPIRATION RATE: 17 BRPM | DIASTOLIC BLOOD PRESSURE: 78 MMHG

## 2025-02-03 DIAGNOSIS — I10 ESSENTIAL HYPERTENSION, BENIGN: ICD-10-CM

## 2025-02-03 DIAGNOSIS — M21.41 FLAT FOOT (PES PLANUS) (ACQUIRED), RIGHT FOOT: ICD-10-CM

## 2025-02-03 DIAGNOSIS — G89.29 CHRONIC BILATERAL LOW BACK PAIN WITHOUT SCIATICA: ICD-10-CM

## 2025-02-03 DIAGNOSIS — G89.29 CHRONIC ABDOMINAL PAIN: ICD-10-CM

## 2025-02-03 DIAGNOSIS — R10.9 CHRONIC ABDOMINAL PAIN: ICD-10-CM

## 2025-02-03 DIAGNOSIS — M05.751 RHEUMATOID ARTHRITIS INVOLVING RIGHT HIP WITH POSITIVE RHEUMATOID FACTOR (HCC): ICD-10-CM

## 2025-02-03 DIAGNOSIS — Z00.00 ANNUAL PHYSICAL EXAM: ICD-10-CM

## 2025-02-03 DIAGNOSIS — E11.21 TYPE 2 DIABETES MELLITUS WITH NEPHROPATHY (HCC): ICD-10-CM

## 2025-02-03 DIAGNOSIS — M54.50 CHRONIC BILATERAL LOW BACK PAIN WITHOUT SCIATICA: ICD-10-CM

## 2025-02-03 DIAGNOSIS — Z09 HOSPITAL DISCHARGE FOLLOW-UP: Primary | ICD-10-CM

## 2025-02-03 PROCEDURE — 3078F DIAST BP <80 MM HG: CPT | Performed by: FAMILY MEDICINE

## 2025-02-03 PROCEDURE — 99396 PREV VISIT EST AGE 40-64: CPT | Performed by: FAMILY MEDICINE

## 2025-02-03 PROCEDURE — 3075F SYST BP GE 130 - 139MM HG: CPT | Performed by: FAMILY MEDICINE

## 2025-02-03 RX ORDER — HYDROCODONE BITARTRATE AND ACETAMINOPHEN 5; 325 MG/1; MG/1
1 TABLET ORAL EVERY 8 HOURS PRN
Qty: 30 TABLET | Refills: 0 | Status: SHIPPED | OUTPATIENT
Start: 2025-02-03 | End: 2025-03-05

## 2025-02-03 RX ORDER — INSULIN GLARGINE 100 [IU]/ML
20 INJECTION, SOLUTION SUBCUTANEOUS NIGHTLY
Qty: 15 ADJUSTABLE DOSE PRE-FILLED PEN SYRINGE | Refills: 3 | Status: SHIPPED | OUTPATIENT
Start: 2025-02-03

## 2025-02-03 SDOH — ECONOMIC STABILITY: FOOD INSECURITY: WITHIN THE PAST 12 MONTHS, YOU WORRIED THAT YOUR FOOD WOULD RUN OUT BEFORE YOU GOT MONEY TO BUY MORE.: NEVER TRUE

## 2025-02-03 SDOH — ECONOMIC STABILITY: FOOD INSECURITY: WITHIN THE PAST 12 MONTHS, THE FOOD YOU BOUGHT JUST DIDN'T LAST AND YOU DIDN'T HAVE MONEY TO GET MORE.: NEVER TRUE

## 2025-02-03 ASSESSMENT — PATIENT HEALTH QUESTIONNAIRE - PHQ9
4. FEELING TIRED OR HAVING LITTLE ENERGY: NOT AT ALL
8. MOVING OR SPEAKING SO SLOWLY THAT OTHER PEOPLE COULD HAVE NOTICED. OR THE OPPOSITE, BEING SO FIGETY OR RESTLESS THAT YOU HAVE BEEN MOVING AROUND A LOT MORE THAN USUAL: NOT AT ALL
SUM OF ALL RESPONSES TO PHQ9 QUESTIONS 1 & 2: 0
1. LITTLE INTEREST OR PLEASURE IN DOING THINGS: NOT AT ALL
SUM OF ALL RESPONSES TO PHQ QUESTIONS 1-9: 0
9. THOUGHTS THAT YOU WOULD BE BETTER OFF DEAD, OR OF HURTING YOURSELF: NOT AT ALL
3. TROUBLE FALLING OR STAYING ASLEEP: NOT AT ALL
2. FEELING DOWN, DEPRESSED OR HOPELESS: NOT AT ALL
SUM OF ALL RESPONSES TO PHQ QUESTIONS 1-9: 0
7. TROUBLE CONCENTRATING ON THINGS, SUCH AS READING THE NEWSPAPER OR WATCHING TELEVISION: NOT AT ALL
5. POOR APPETITE OR OVEREATING: NOT AT ALL
10. IF YOU CHECKED OFF ANY PROBLEMS, HOW DIFFICULT HAVE THESE PROBLEMS MADE IT FOR YOU TO DO YOUR WORK, TAKE CARE OF THINGS AT HOME, OR GET ALONG WITH OTHER PEOPLE: NOT DIFFICULT AT ALL
SUM OF ALL RESPONSES TO PHQ QUESTIONS 1-9: 0
6. FEELING BAD ABOUT YOURSELF - OR THAT YOU ARE A FAILURE OR HAVE LET YOURSELF OR YOUR FAMILY DOWN: NOT AT ALL
SUM OF ALL RESPONSES TO PHQ QUESTIONS 1-9: 0

## 2025-02-03 ASSESSMENT — ANXIETY QUESTIONNAIRES
IF YOU CHECKED OFF ANY PROBLEMS ON THIS QUESTIONNAIRE, HOW DIFFICULT HAVE THESE PROBLEMS MADE IT FOR YOU TO DO YOUR WORK, TAKE CARE OF THINGS AT HOME, OR GET ALONG WITH OTHER PEOPLE: NOT DIFFICULT AT ALL
7. FEELING AFRAID AS IF SOMETHING AWFUL MIGHT HAPPEN: NOT AT ALL
6. BECOMING EASILY ANNOYED OR IRRITABLE: NOT AT ALL
1. FEELING NERVOUS, ANXIOUS, OR ON EDGE: NOT AT ALL
3. WORRYING TOO MUCH ABOUT DIFFERENT THINGS: NOT AT ALL
2. NOT BEING ABLE TO STOP OR CONTROL WORRYING: NOT AT ALL
GAD7 TOTAL SCORE: 0
5. BEING SO RESTLESS THAT IT IS HARD TO SIT STILL: NOT AT ALL
4. TROUBLE RELAXING: NOT AT ALL

## 2025-02-03 ASSESSMENT — VISUAL ACUITY
OD_CC: 20/30
OS_CC: 20/25

## 2025-02-03 NOTE — PROGRESS NOTES
Linda Espinal is a 64 y.o.  female and presents with    Chief Complaint   Patient presents with    Follow-Up from Hospital    Annual Exam    Medication Refill    Groin Swelling    Lower Back Pain       Subjective:  Well Adult Physical   Patient here for a comprehensive physical exam.  She has been to the ER 7 times over the past month.  The patient reports problems - increased stress and joint pain; she has been arguing with her sister and brothers about her home which belonged to her mother.    Do you take any herbs or supplements that were not prescribed by a doctor? no Are you taking calcium supplements? no Are you taking aspirin daily? no    Diabetes Mellitus:  She has diabetes mellitus, and  Hyperlipidemia.   Diabetic ROS - medication compliance: using only 3 units of insulin; diabetic diet compliance: noncompliant some of the time.   Lab review: labs reviewed, hgb A1c 9.4 ;yesterday glucose 203     Depression Review:  Patient is seen for followup of depression. Treatment includes effexor and no other therapies.   Ongoing symptoms include depressed mood, irritability, anhedonia, insomnia, fatigue, feelings of worthlessness/guilt, difficulty concentrating and hopelessness.  She denies hypersomnia, impaired memory and recurrent thoughts of death.   She experiences the following side effects from the treatment: none.     Anxiety Review:  Patient is seen for sleep disturbance. Current treatment includes doxepin and no other therapies.   Ongoing symptoms include: insomnia.   Patient denies: palpitations, sweating, chest pain, shortness of breath, dizziness, paresthesias, racing thoughts, psychomotor agitation, feelings of losing control, difficulty concentrating, suicidal ideation.   Reported side effects from the treatment: none.        ROS   General ROS: negative for - fatigue, fever, weight gain, or weight loss  Respiratory ROS: positive for - cough  negative for - shortness of

## 2025-02-03 NOTE — PROGRESS NOTES
Linda Espinal is a 64 y.o. year old female who presents today for   Chief Complaint   Patient presents with    Follow-Up from Hospital    Annual Exam    Medication Refill        \"Have you been to the ER, urgent care clinic since your last visit?  Hospitalized since your last visit?\"   YES - When: approximately   ago.  Where and Why:  .     “Have you seen or consulted any other health care providers outside our system since your last visit?”   NO       “Have you had a diabetic eye exam?”    NO     Date of last diabetic eye exam: 5/29/2017           Debra Melchor [facility]  [facility]  [address] [place] [address] [id number]  Ph: [phone]  Direct Fax: [phone]

## 2025-02-11 ENCOUNTER — TELEPHONE (OUTPATIENT)
Facility: CLINIC | Age: 65
End: 2025-02-11

## 2025-02-11 NOTE — TELEPHONE ENCOUNTER
Pt called in would like to get medication for a yeast infection           Please advise      Thank you

## 2025-02-17 DIAGNOSIS — E11.21 TYPE 2 DIABETES MELLITUS WITH NEPHROPATHY (HCC): ICD-10-CM

## 2025-02-17 DIAGNOSIS — N18.9 CHRONIC KIDNEY DISEASE, UNSPECIFIED CKD STAGE: ICD-10-CM

## 2025-02-20 DIAGNOSIS — B37.31 CANDIDA VAGINITIS: ICD-10-CM

## 2025-02-20 RX ORDER — FLUCONAZOLE 150 MG/1
150 TABLET ORAL
Qty: 6 TABLET | Refills: 0 | Status: SHIPPED | OUTPATIENT
Start: 2025-02-20 | End: 2025-03-10

## 2025-02-20 RX ORDER — DAPAGLIFLOZIN 10 MG/1
10 TABLET, FILM COATED ORAL DAILY
Qty: 90 TABLET | Refills: 3 | Status: SHIPPED | OUTPATIENT
Start: 2025-02-20

## 2025-02-23 DIAGNOSIS — G45.9 TIA (TRANSIENT ISCHEMIC ATTACK): ICD-10-CM

## 2025-02-23 DIAGNOSIS — E11.21 TYPE 2 DIABETES MELLITUS WITH NEPHROPATHY (HCC): ICD-10-CM

## 2025-02-24 NOTE — TELEPHONE ENCOUNTER
Medication requested :   Requested Prescriptions     Pending Prescriptions Disp Refills    atorvastatin (LIPITOR) 40 MG tablet [Pharmacy Med Name: ATORVASTATIN 40 MG TABLET] 90 tablet 3     Sig: take 1 tablet by mouth once daily      PCP: Nirav Gupta MD  LOV:           2/3/2025   NOV DMA: Visit date not found  FUTURE APPT: No future appointments.    Thank you.

## 2025-02-26 RX ORDER — ATORVASTATIN CALCIUM 40 MG/1
40 TABLET, FILM COATED ORAL DAILY
Qty: 90 TABLET | Refills: 3 | Status: SHIPPED | OUTPATIENT
Start: 2025-02-26

## 2025-03-12 ENCOUNTER — HOSPITAL ENCOUNTER (OUTPATIENT)
Facility: HOSPITAL | Age: 65
Setting detail: SPECIMEN
Discharge: HOME | End: 2025-03-15
Payer: COMMERCIAL

## 2025-03-12 DIAGNOSIS — E11.21 TYPE 2 DIABETES MELLITUS WITH NEPHROPATHY (HCC): ICD-10-CM

## 2025-03-12 LAB
EST. AVERAGE GLUCOSE BLD GHB EST-MCNC: 223 MG/DL
HBA1C MFR BLD: 9.4 % (ref 4.2–5.6)

## 2025-03-12 PROCEDURE — 36415 COLL VENOUS BLD VENIPUNCTURE: CPT

## 2025-03-12 PROCEDURE — 83036 HEMOGLOBIN GLYCOSYLATED A1C: CPT

## 2025-03-24 DIAGNOSIS — Z86.73 HISTORY OF RIGHT MCA STROKE: ICD-10-CM

## 2025-03-25 RX ORDER — CLOPIDOGREL BISULFATE 75 MG/1
75 TABLET ORAL DAILY
Qty: 90 TABLET | Refills: 3 | Status: SHIPPED | OUTPATIENT
Start: 2025-03-25

## 2025-04-03 ENCOUNTER — RESULTS FOLLOW-UP (OUTPATIENT)
Facility: CLINIC | Age: 65
End: 2025-04-03

## 2025-04-09 ENCOUNTER — TELEPHONE (OUTPATIENT)
Facility: CLINIC | Age: 65
End: 2025-04-09

## 2025-04-09 NOTE — TELEPHONE ENCOUNTER
Pharmacy refill request recieved for         fluconazole (DIFLUCAN) 150 MG table        LOV:002/03/2025  NOV:none scheduled    Please advise    Thank you

## 2025-04-10 DIAGNOSIS — B37.31 CANDIDA VAGINITIS: ICD-10-CM

## 2025-04-22 ENCOUNTER — TRANSCRIBE ORDERS (OUTPATIENT)
Facility: HOSPITAL | Age: 65
End: 2025-04-22

## 2025-04-22 DIAGNOSIS — Z12.31 VISIT FOR SCREENING MAMMOGRAM: Primary | ICD-10-CM

## 2025-04-24 ENCOUNTER — HOSPITAL ENCOUNTER (OUTPATIENT)
Dept: WOMENS IMAGING | Facility: HOSPITAL | Age: 65
Discharge: HOME | End: 2025-04-27
Attending: FAMILY MEDICINE
Payer: COMMERCIAL

## 2025-04-24 VITALS — BODY MASS INDEX: 28.51 KG/M2 | WEIGHT: 167 LBS | HEIGHT: 64 IN

## 2025-04-24 DIAGNOSIS — Z12.31 VISIT FOR SCREENING MAMMOGRAM: ICD-10-CM

## 2025-04-24 PROCEDURE — 77063 BREAST TOMOSYNTHESIS BI: CPT

## 2025-04-26 RX ORDER — FLUCONAZOLE 150 MG/1
150 TABLET ORAL ONCE
Qty: 1 TABLET | Refills: 0 | Status: SHIPPED | OUTPATIENT
Start: 2025-04-26 | End: 2025-04-26

## 2025-05-01 ENCOUNTER — HOSPITAL ENCOUNTER (OUTPATIENT)
Facility: HOSPITAL | Age: 65
Setting detail: SPECIMEN
Discharge: HOME | End: 2025-05-01
Payer: COMMERCIAL

## 2025-05-01 ENCOUNTER — OFFICE VISIT (OUTPATIENT)
Facility: CLINIC | Age: 65
End: 2025-05-01
Payer: COMMERCIAL

## 2025-05-01 VITALS
TEMPERATURE: 97.3 F | DIASTOLIC BLOOD PRESSURE: 80 MMHG | SYSTOLIC BLOOD PRESSURE: 132 MMHG | RESPIRATION RATE: 20 BRPM | OXYGEN SATURATION: 97 % | WEIGHT: 154 LBS | HEART RATE: 97 BPM | BODY MASS INDEX: 26.29 KG/M2 | HEIGHT: 64 IN

## 2025-05-01 DIAGNOSIS — E11.21 TYPE 2 DIABETES MELLITUS WITH NEPHROPATHY (HCC): ICD-10-CM

## 2025-05-01 DIAGNOSIS — Z00.00 ANNUAL PHYSICAL EXAM: Primary | ICD-10-CM

## 2025-05-01 DIAGNOSIS — M05.751 RHEUMATOID ARTHRITIS INVOLVING RIGHT HIP WITH POSITIVE RHEUMATOID FACTOR (HCC): ICD-10-CM

## 2025-05-01 DIAGNOSIS — B37.31 CANDIDA VAGINITIS: ICD-10-CM

## 2025-05-01 DIAGNOSIS — G89.29 CHRONIC ABDOMINAL PAIN: ICD-10-CM

## 2025-05-01 DIAGNOSIS — Z78.0 ENCOUNTER FOR OSTEOPOROSIS SCREENING IN ASYMPTOMATIC POSTMENOPAUSAL PATIENT: ICD-10-CM

## 2025-05-01 DIAGNOSIS — F33.41 RECURRENT MAJOR DEPRESSIVE DISORDER, IN PARTIAL REMISSION: ICD-10-CM

## 2025-05-01 DIAGNOSIS — M21.41 FLAT FOOT (PES PLANUS) (ACQUIRED), RIGHT FOOT: ICD-10-CM

## 2025-05-01 DIAGNOSIS — I10 ESSENTIAL HYPERTENSION, BENIGN: ICD-10-CM

## 2025-05-01 DIAGNOSIS — G89.29 CHRONIC BILATERAL LOW BACK PAIN WITHOUT SCIATICA: ICD-10-CM

## 2025-05-01 DIAGNOSIS — Z13.820 ENCOUNTER FOR OSTEOPOROSIS SCREENING IN ASYMPTOMATIC POSTMENOPAUSAL PATIENT: ICD-10-CM

## 2025-05-01 DIAGNOSIS — R10.9 CHRONIC ABDOMINAL PAIN: ICD-10-CM

## 2025-05-01 DIAGNOSIS — M54.50 CHRONIC BILATERAL LOW BACK PAIN WITHOUT SCIATICA: ICD-10-CM

## 2025-05-01 LAB
CREAT UR-MCNC: 181 MG/DL (ref 30–125)
MICROALBUMIN UR-MCNC: 1.87 MG/DL (ref 0–3)
MICROALBUMIN/CREAT UR-RTO: 10 MG/G (ref 0–30)

## 2025-05-01 PROCEDURE — 99397 PER PM REEVAL EST PAT 65+ YR: CPT | Performed by: FAMILY MEDICINE

## 2025-05-01 PROCEDURE — 82043 UR ALBUMIN QUANTITATIVE: CPT

## 2025-05-01 PROCEDURE — 99214 OFFICE O/P EST MOD 30 MIN: CPT | Performed by: FAMILY MEDICINE

## 2025-05-01 PROCEDURE — 3079F DIAST BP 80-89 MM HG: CPT | Performed by: FAMILY MEDICINE

## 2025-05-01 PROCEDURE — 3075F SYST BP GE 130 - 139MM HG: CPT | Performed by: FAMILY MEDICINE

## 2025-05-01 PROCEDURE — 82570 ASSAY OF URINE CREATININE: CPT

## 2025-05-01 RX ORDER — HYDROCODONE BITARTRATE AND ACETAMINOPHEN 5; 325 MG/1; MG/1
1 TABLET ORAL EVERY 8 HOURS PRN
Qty: 30 TABLET | Refills: 0 | Status: SHIPPED | OUTPATIENT
Start: 2025-05-01 | End: 2025-05-31

## 2025-05-01 RX ORDER — FLUCONAZOLE 150 MG/1
150 TABLET ORAL DAILY
Qty: 3 TABLET | Refills: 0 | Status: SHIPPED | OUTPATIENT
Start: 2025-05-01 | End: 2025-05-04

## 2025-05-01 RX ORDER — VENLAFAXINE HYDROCHLORIDE 37.5 MG/1
37.5 CAPSULE, EXTENDED RELEASE ORAL DAILY
Qty: 90 CAPSULE | Refills: 3 | Status: SHIPPED | OUTPATIENT
Start: 2025-05-01

## 2025-05-01 RX ORDER — HYDROXYCHLOROQUINE SULFATE 200 MG/1
200 TABLET, FILM COATED ORAL DAILY
Qty: 90 TABLET | Refills: 3 | Status: SHIPPED | OUTPATIENT
Start: 2025-05-01

## 2025-05-01 SDOH — ECONOMIC STABILITY: FOOD INSECURITY: WITHIN THE PAST 12 MONTHS, THE FOOD YOU BOUGHT JUST DIDN'T LAST AND YOU DIDN'T HAVE MONEY TO GET MORE.: NEVER TRUE

## 2025-05-01 SDOH — ECONOMIC STABILITY: FOOD INSECURITY: WITHIN THE PAST 12 MONTHS, YOU WORRIED THAT YOUR FOOD WOULD RUN OUT BEFORE YOU GOT MONEY TO BUY MORE.: NEVER TRUE

## 2025-05-01 ASSESSMENT — LIFESTYLE VARIABLES
HOW MANY STANDARD DRINKS CONTAINING ALCOHOL DO YOU HAVE ON A TYPICAL DAY: PATIENT DOES NOT DRINK
HOW OFTEN DO YOU HAVE A DRINK CONTAINING ALCOHOL: NEVER

## 2025-05-01 ASSESSMENT — PATIENT HEALTH QUESTIONNAIRE - PHQ9
1. LITTLE INTEREST OR PLEASURE IN DOING THINGS: NOT AT ALL
SUM OF ALL RESPONSES TO PHQ QUESTIONS 1-9: 0
7. TROUBLE CONCENTRATING ON THINGS, SUCH AS READING THE NEWSPAPER OR WATCHING TELEVISION: NOT AT ALL
6. FEELING BAD ABOUT YOURSELF - OR THAT YOU ARE A FAILURE OR HAVE LET YOURSELF OR YOUR FAMILY DOWN: NOT AT ALL
SUM OF ALL RESPONSES TO PHQ QUESTIONS 1-9: 0
4. FEELING TIRED OR HAVING LITTLE ENERGY: NOT AT ALL
SUM OF ALL RESPONSES TO PHQ QUESTIONS 1-9: 0
SUM OF ALL RESPONSES TO PHQ QUESTIONS 1-9: 0
3. TROUBLE FALLING OR STAYING ASLEEP: NOT AT ALL
9. THOUGHTS THAT YOU WOULD BE BETTER OFF DEAD, OR OF HURTING YOURSELF: NOT AT ALL
10. IF YOU CHECKED OFF ANY PROBLEMS, HOW DIFFICULT HAVE THESE PROBLEMS MADE IT FOR YOU TO DO YOUR WORK, TAKE CARE OF THINGS AT HOME, OR GET ALONG WITH OTHER PEOPLE: NOT DIFFICULT AT ALL
8. MOVING OR SPEAKING SO SLOWLY THAT OTHER PEOPLE COULD HAVE NOTICED. OR THE OPPOSITE, BEING SO FIGETY OR RESTLESS THAT YOU HAVE BEEN MOVING AROUND A LOT MORE THAN USUAL: NOT AT ALL
2. FEELING DOWN, DEPRESSED OR HOPELESS: NOT AT ALL
5. POOR APPETITE OR OVEREATING: NOT AT ALL

## 2025-05-01 ASSESSMENT — VISUAL ACUITY
OD_CC: 20/25
OS_CC: 20/25

## 2025-05-01 NOTE — PROGRESS NOTES
Linda Espinal is a 65 y.o. year old female who presents today for   Chief Complaint   Patient presents with    Medicare AWV    Medication Refill        \"Have you been to the ER, urgent care clinic since your last visit?  Hospitalized since your last visit?\"   YES - When: approximately 11 days ago.  Where and Why: [facility] ED/ ABD PAIN.     “Have you seen or consulted any other health care providers outside our system since your last visit?”   NO       “Have you had a diabetic eye exam?”    NO     Date of last diabetic eye exam: 5/29/2017           Gina Hinton  [facility]  Phone: [phone]  Fax: [phone]

## 2025-05-01 NOTE — PROGRESS NOTES
Linda Espinal is a 65 y.o.  female and presents with    Chief Complaint   Patient presents with    Medication Refill    Annual Exam     Subjective:  Well Adult Physical   Patient here for a comprehensive physical exam.The patient reports problems - loose stools and abdominal pain and cramping and visit to ER  Do you take any herbs or supplements that were not prescribed by a doctor? no Are you taking calcium supplements? no Are you taking aspirin daily? yes    Diabetes Mellitus:  She has diabetes mellitus, and  Hyperlipidemia.   Diabetic ROS - medication compliance: compliant most of the time but she has been out of glp1 due to supply, diabetic diet compliance: noncompliant some of the time.   Lab review: labs reviewed, hgb A1c 10.1 while inpatient     Depression Review:  Patient is seen for followup of depression. Treatment includes effexor and no other therapies.   Ongoing symptoms include depressed mood, irritability, anhedonia, insomnia, fatigue, feelings of worthlessness/guilt, difficulty concentrating and hopelessness.  She denies hypersomnia, impaired memory and recurrent thoughts of death.   She experiences the following side effects from the treatment: none.     Anxiety Review:  Patient is seen for sleep disturbance. Current treatment includes doxepin and no other therapies.   Ongoing symptoms include: insomnia.   Patient denies: palpitations, sweating, chest pain, shortness of breath, dizziness, paresthesias, racing thoughts, psychomotor agitation, feelings of losing control, difficulty concentrating, suicidal ideation.   Reported side effects from the treatment: none.        ROS   General ROS: negative for - fatigue, fever, positive for weight loss  Respiratory ROS: positive for - intermittent cough  negative for - shortness of breath  Cardiovascular ROS: no chest pain or dyspnea on exertion  Gastrointestinal ROS: no abdominal pain, change in bowel habits, or black or bloody

## 2025-05-02 ENCOUNTER — RESULTS FOLLOW-UP (OUTPATIENT)
Facility: CLINIC | Age: 65
End: 2025-05-02

## 2025-05-19 ENCOUNTER — TELEPHONE (OUTPATIENT)
Facility: CLINIC | Age: 65
End: 2025-05-19

## 2025-05-19 DIAGNOSIS — M54.50 CHRONIC BILATERAL LOW BACK PAIN WITHOUT SCIATICA: ICD-10-CM

## 2025-05-19 DIAGNOSIS — F33.41 RECURRENT MAJOR DEPRESSIVE DISORDER, IN PARTIAL REMISSION: ICD-10-CM

## 2025-05-19 DIAGNOSIS — G89.29 CHRONIC BILATERAL LOW BACK PAIN WITHOUT SCIATICA: ICD-10-CM

## 2025-05-19 DIAGNOSIS — M21.41 FLAT FOOT (PES PLANUS) (ACQUIRED), RIGHT FOOT: ICD-10-CM

## 2025-05-19 DIAGNOSIS — M05.751 RHEUMATOID ARTHRITIS INVOLVING RIGHT HIP WITH POSITIVE RHEUMATOID FACTOR (HCC): ICD-10-CM

## 2025-05-19 RX ORDER — HYDROXYCHLOROQUINE SULFATE 200 MG/1
200 TABLET, FILM COATED ORAL DAILY
Qty: 90 TABLET | Refills: 3 | Status: SHIPPED | OUTPATIENT
Start: 2025-05-19

## 2025-05-19 RX ORDER — HYDROCODONE BITARTRATE AND ACETAMINOPHEN 5; 325 MG/1; MG/1
1 TABLET ORAL EVERY 8 HOURS PRN
Qty: 30 TABLET | Refills: 0 | Status: SHIPPED | OUTPATIENT
Start: 2025-05-19 | End: 2025-06-18

## 2025-05-19 RX ORDER — VENLAFAXINE HYDROCHLORIDE 37.5 MG/1
37.5 CAPSULE, EXTENDED RELEASE ORAL DAILY
Qty: 90 CAPSULE | Refills: 3 | Status: SHIPPED | OUTPATIENT
Start: 2025-05-19

## 2025-05-19 NOTE — TELEPHONE ENCOUNTER
Pt came to the office again to f/u on med refills from OV back on 5/1/25.  Transmissions to pharmacy failed that day.    HYDROcodone-acetaminophen (NORCO) 5-325 MG per tablet [[id number]]     Hydroxychloroquine (PLAQUENIL) 200 MG tablet [[id number]]     Venlafaxine (EFFEXOR XR) 37.5 MG extended release capsule [[id number]]     Pt is requesting refills be re-sent ASAP.  Please assist. Thank you.

## 2025-05-20 ENCOUNTER — OFFICE VISIT (OUTPATIENT)
Facility: CLINIC | Age: 65
End: 2025-05-20
Payer: COMMERCIAL

## 2025-05-20 VITALS
HEART RATE: 87 BPM | RESPIRATION RATE: 16 BRPM | OXYGEN SATURATION: 99 % | HEIGHT: 64 IN | WEIGHT: 158 LBS | SYSTOLIC BLOOD PRESSURE: 121 MMHG | DIASTOLIC BLOOD PRESSURE: 77 MMHG | TEMPERATURE: 97.3 F | BODY MASS INDEX: 26.98 KG/M2

## 2025-05-20 DIAGNOSIS — M05.751 RHEUMATOID ARTHRITIS INVOLVING RIGHT HIP WITH POSITIVE RHEUMATOID FACTOR (HCC): ICD-10-CM

## 2025-05-20 DIAGNOSIS — R10.9 CHRONIC ABDOMINAL PAIN: ICD-10-CM

## 2025-05-20 DIAGNOSIS — F33.41 RECURRENT MAJOR DEPRESSIVE DISORDER, IN PARTIAL REMISSION: ICD-10-CM

## 2025-05-20 DIAGNOSIS — M54.50 CHRONIC BILATERAL LOW BACK PAIN WITHOUT SCIATICA: Primary | ICD-10-CM

## 2025-05-20 DIAGNOSIS — E11.21 TYPE 2 DIABETES MELLITUS WITH NEPHROPATHY (HCC): ICD-10-CM

## 2025-05-20 DIAGNOSIS — G89.29 CHRONIC BILATERAL LOW BACK PAIN WITHOUT SCIATICA: Primary | ICD-10-CM

## 2025-05-20 DIAGNOSIS — G89.29 CHRONIC ABDOMINAL PAIN: ICD-10-CM

## 2025-05-20 PROCEDURE — 99214 OFFICE O/P EST MOD 30 MIN: CPT | Performed by: FAMILY MEDICINE

## 2025-05-20 PROCEDURE — 3078F DIAST BP <80 MM HG: CPT | Performed by: FAMILY MEDICINE

## 2025-05-20 PROCEDURE — 3074F SYST BP LT 130 MM HG: CPT | Performed by: FAMILY MEDICINE

## 2025-05-20 PROCEDURE — 3046F HEMOGLOBIN A1C LEVEL >9.0%: CPT | Performed by: FAMILY MEDICINE

## 2025-05-20 PROCEDURE — 1124F ACP DISCUSS-NO DSCNMKR DOCD: CPT | Performed by: FAMILY MEDICINE

## 2025-05-20 SDOH — ECONOMIC STABILITY: FOOD INSECURITY: WITHIN THE PAST 12 MONTHS, THE FOOD YOU BOUGHT JUST DIDN'T LAST AND YOU DIDN'T HAVE MONEY TO GET MORE.: NEVER TRUE

## 2025-05-20 SDOH — ECONOMIC STABILITY: FOOD INSECURITY: WITHIN THE PAST 12 MONTHS, YOU WORRIED THAT YOUR FOOD WOULD RUN OUT BEFORE YOU GOT MONEY TO BUY MORE.: NEVER TRUE

## 2025-05-20 ASSESSMENT — PATIENT HEALTH QUESTIONNAIRE - PHQ9
SUM OF ALL RESPONSES TO PHQ QUESTIONS 1-9: 0
8. MOVING OR SPEAKING SO SLOWLY THAT OTHER PEOPLE COULD HAVE NOTICED. OR THE OPPOSITE, BEING SO FIGETY OR RESTLESS THAT YOU HAVE BEEN MOVING AROUND A LOT MORE THAN USUAL: NOT AT ALL
9. THOUGHTS THAT YOU WOULD BE BETTER OFF DEAD, OR OF HURTING YOURSELF: NOT AT ALL
3. TROUBLE FALLING OR STAYING ASLEEP: NOT AT ALL
7. TROUBLE CONCENTRATING ON THINGS, SUCH AS READING THE NEWSPAPER OR WATCHING TELEVISION: NOT AT ALL
1. LITTLE INTEREST OR PLEASURE IN DOING THINGS: NOT AT ALL
SUM OF ALL RESPONSES TO PHQ QUESTIONS 1-9: 0
5. POOR APPETITE OR OVEREATING: NOT AT ALL
4. FEELING TIRED OR HAVING LITTLE ENERGY: NOT AT ALL
10. IF YOU CHECKED OFF ANY PROBLEMS, HOW DIFFICULT HAVE THESE PROBLEMS MADE IT FOR YOU TO DO YOUR WORK, TAKE CARE OF THINGS AT HOME, OR GET ALONG WITH OTHER PEOPLE: NOT DIFFICULT AT ALL
6. FEELING BAD ABOUT YOURSELF - OR THAT YOU ARE A FAILURE OR HAVE LET YOURSELF OR YOUR FAMILY DOWN: NOT AT ALL
SUM OF ALL RESPONSES TO PHQ QUESTIONS 1-9: 0
2. FEELING DOWN, DEPRESSED OR HOPELESS: NOT AT ALL
SUM OF ALL RESPONSES TO PHQ QUESTIONS 1-9: 0

## 2025-05-20 NOTE — PROGRESS NOTES
Linda Espinal is a 65 y.o.  female and presents with    Chief Complaint   Patient presents with    Abdominal Pain     Subjective:  Ms. Espinal c/o abdominal pain which is chronic.  She reports it feels like someone is kicking her in the stomach but it is unchanged from previous.  She has not had pain medication for the past 2 weeks.  There has been disconnect between pharmacy.       She had a fall 2 days ago and went to the ER.    She had diarrhea 4 days ago and went to the ER.  She was prescribed hydrocodone #12.  Pain radiating in right lower back.      She had abdominal pain 1 week ago and went to the ER.       Depression Review:  Patient is seen for followup of depression. Treatment includes effexor and no other therapies.   Ongoing symptoms include depressed mood, irritability, anhedonia, insomnia, fatigue, feelings of worthlessness/guilt, difficulty concentrating and hopelessness.  She denies hypersomnia, impaired memory and recurrent thoughts of death.   She experiences the following side effects from the treatment: none.     Anxiety Review:  Patient is seen for sleep disturbance. Current treatment includes doxepin and no other therapies.   Ongoing symptoms include: insomnia.   Patient denies: palpitations, sweating, chest pain, shortness of breath, dizziness, paresthesias, racing thoughts, psychomotor agitation, feelings of losing control, difficulty concentrating, suicidal ideation.   Reported side effects from the treatment: none.        ROS   General ROS: negative for - fatigue, fever, positive for weight loss  Respiratory ROS: positive for - intermittent cough  negative for - shortness of breath  Cardiovascular ROS: no chest pain or dyspnea on exertion  Gastrointestinal ROS: no abdominal pain, change in bowel habits, or black or bloody stools  Genito-Urinary ROS: no dysuria, trouble voiding, or hematuria  positive for - urinary frequency/urgency  Dermatological ROS: negative for -

## 2025-05-20 NOTE — PROGRESS NOTES
Linda Espinal is a 65 y.o. year old female who presents today for   Chief Complaint   Patient presents with    Abdominal Pain       \"Have you been to the ER, urgent care clinic since your last visit?  Hospitalized since your last visit?\"    ED follow up    “Have you seen or consulted any other health care providers outside our system since your last visit?”    no      “Have you had a diabetic eye exam?”    NO     Date of last diabetic eye exam: 5/29/2017         Click Here for Release of Records Request    - WENDI Vega  [facility]  Phone: [phone]  Fax: [phone]

## 2025-05-22 NOTE — PROGRESS NOTES
Date/Time:  11/25/2020 11:23 AM   Call within 2 business days of discharge: Yes   Attempted to reach patient by telephone. Left HIPPA compliant message requesting a return call. Will attempt to reach patient again. Patient instructed to take Trelegy inhaler on the morning of the procedure.

## 2025-06-09 NOTE — TELEPHONE ENCOUNTER
Medication requested :   Requested Prescriptions     Pending Prescriptions Disp Refills    doxepin (SINEQUAN) 50 MG capsule [Pharmacy Med Name: DOXEPIN 50 MG CAPSULE] 90 capsule 3     Sig: take 1 capsule by mouth every evening      PCP: Nirav Gupta MD  LOV:           5/20/2025 NOV DMA: Visit date not found  FUTURE APPT:   Future Appointments   Date Time Provider Department Center   6/13/2025  1:30 PM [facility] RM 1 [facility]       Thank you.

## 2025-06-10 RX ORDER — DOXEPIN HYDROCHLORIDE 50 MG/1
50 CAPSULE ORAL EVERY EVENING
Qty: 90 CAPSULE | Refills: 3 | Status: SHIPPED | OUTPATIENT
Start: 2025-06-10

## 2025-07-01 DIAGNOSIS — E11.21 TYPE 2 DIABETES MELLITUS WITH NEPHROPATHY (HCC): ICD-10-CM

## 2025-07-01 DIAGNOSIS — M05.751 RHEUMATOID ARTHRITIS INVOLVING RIGHT HIP WITH POSITIVE RHEUMATOID FACTOR (HCC): ICD-10-CM

## 2025-07-01 DIAGNOSIS — N18.9 CHRONIC KIDNEY DISEASE, UNSPECIFIED CKD STAGE: ICD-10-CM

## 2025-07-01 RX ORDER — DAPAGLIFLOZIN 10 MG/1
10 TABLET, FILM COATED ORAL DAILY
Qty: 90 TABLET | Refills: 3 | Status: SHIPPED | OUTPATIENT
Start: 2025-07-01

## 2025-07-01 RX ORDER — HYDROXYCHLOROQUINE SULFATE 200 MG/1
200 TABLET, FILM COATED ORAL DAILY
Qty: 90 TABLET | Refills: 3 | Status: SHIPPED | OUTPATIENT
Start: 2025-07-01

## 2025-07-01 NOTE — TELEPHONE ENCOUNTER
Pt came to office requesting med refills for:    Dapagliflozin (FARXIGA) 10 MG tablet [[id number]]     Hydroxychloroquine (PLAQUENIL) 200 MG tablet [[id number]]     LOV:  5/20/2025  NOV:  7/24/2025    Please assist. Thank you.

## 2025-07-08 ENCOUNTER — TELEPHONE (OUTPATIENT)
Facility: CLINIC | Age: 65
End: 2025-07-08

## 2025-07-08 NOTE — TELEPHONE ENCOUNTER
[facility] called to inform Dr. Gupat that in the last 6 mos pt has been seen in the ED for multiple visits.  She was seen yesterday for arm and abdominal pain.    Please encourage pt to be seen in our office.    If you have any questions please call [facility] at:  Phone:  [phone]    Thank you.

## 2025-07-17 DIAGNOSIS — Z86.73 HISTORY OF RIGHT MCA STROKE: ICD-10-CM

## 2025-07-17 NOTE — TELEPHONE ENCOUNTER
Medication requested :   Requested Prescriptions     Pending Prescriptions Disp Refills    clopidogrel (PLAVIX) 75 MG tablet 90 tablet 3     Sig: Take 1 tablet by mouth daily      PCP: Nirav Gupta MD  LOV:           5/20/2025   NOV DMA: 7/24/2025  FUTURE APPT:   Future Appointments   Date Time Provider Department Center   7/21/2025  1:30 PM [facility] RM 1 [facility]   7/24/2025  9:30 AM Nirav Gupta MD \[facility]\"" DEP       Thank you.

## 2025-07-17 NOTE — TELEPHONE ENCOUNTER
Pt came in the office requesting med refills be sent to the new pharmacy ([facility])    LOV: 5.20.25    NOV: 7.24.25    -Droplet Pen Needle 32G x 5/32''

## 2025-07-18 RX ORDER — CLOPIDOGREL BISULFATE 75 MG/1
75 TABLET ORAL DAILY
Qty: 90 TABLET | Refills: 3 | Status: SHIPPED | OUTPATIENT
Start: 2025-07-18

## 2025-07-21 ENCOUNTER — HOSPITAL ENCOUNTER (OUTPATIENT)
Facility: HOSPITAL | Age: 65
Discharge: HOME | End: 2025-07-24
Attending: FAMILY MEDICINE
Payer: COMMERCIAL

## 2025-07-21 DIAGNOSIS — Z78.0 ENCOUNTER FOR OSTEOPOROSIS SCREENING IN ASYMPTOMATIC POSTMENOPAUSAL PATIENT: ICD-10-CM

## 2025-07-21 DIAGNOSIS — Z13.820 ENCOUNTER FOR OSTEOPOROSIS SCREENING IN ASYMPTOMATIC POSTMENOPAUSAL PATIENT: ICD-10-CM

## 2025-07-21 PROCEDURE — 77080 DXA BONE DENSITY AXIAL: CPT

## 2025-07-21 NOTE — ED NOTES
Purposeful rounding completed:    Side rails up x 1:  YES  Bed in low position and wheels locked: YES  Call bell within reach: YES  Comfort addressed: YES    Toileting needs addressed: YES  Plan of care reviewed/updated with patient and or family members: YES  IV site assessed: YES  Pain assessed and addressed: YES, 4    Patient lying in bed and stated that the pain medicine she was medicated with by previous nurse is helping with her pain a lot [Alert] : alert [Well Nourished] : well nourished [Healthy Appearance] : healthy appearance [No Acute Distress] : no acute distress [Well Developed] : well developed [Normal Voice/Communication] : normal voice communication [Normal Sclera/Conjunctiva] : normal sclera/conjunctiva [Normal Optic Discs] : normal optic discs [No Lid Lag] : no lid lag [Normal Outer Ear/Nose] : the ears and nose were normal in appearance [Normal Lips/Gums] : the lips and gums were normal [Normal Nasal Mucosa] : the nasal mucosa was normal [No Respiratory Distress] : no respiratory distress [No Accessory Muscle Use] : no accessory muscle use [Oriented x3] : oriented to person, place, and time [Normal Affect] : the affect was normal [Normal Insight/Judgement] : insight and judgment were intact [Normal Mood] : the mood was normal [de-identified] : Enlarged multinodular thyroid mildly increased in consistency, painless, mobile with swallowing with some submandibular enlarged lymph nodes, soft, will request US copy from [facility]

## 2025-07-22 NOTE — PROGRESS NOTES
0750 Bedside and Verbal shift change report given to Shayy Gonzalez (oncoming nurse) by Codie Huddleston (offgoing nurse). Report included the following information SBAR, Kardex, Intake/Output and MAR.     1028 Pt due to be transported to [facility] at noon today    1127 Discharge instructions provided to pt, verbally and in writing. Pt had all questions answered. Armbands removed and shredded.     1147 Report given to [facility], [id number] Report given to transport Detail Level: Simple Comment: Advised annual genital exam by ob/gyn Render Risk Assessment In Note?: no

## 2025-07-24 ENCOUNTER — OFFICE VISIT (OUTPATIENT)
Facility: CLINIC | Age: 65
End: 2025-07-24
Payer: COMMERCIAL

## 2025-07-24 ENCOUNTER — HOSPITAL ENCOUNTER (OUTPATIENT)
Facility: HOSPITAL | Age: 65
Setting detail: SPECIMEN
Discharge: HOME | End: 2025-07-27
Payer: COMMERCIAL

## 2025-07-24 VITALS
OXYGEN SATURATION: 97 % | HEIGHT: 64 IN | RESPIRATION RATE: 18 BRPM | HEART RATE: 94 BPM | TEMPERATURE: 97 F | WEIGHT: 147.6 LBS | BODY MASS INDEX: 25.2 KG/M2 | SYSTOLIC BLOOD PRESSURE: 116 MMHG | DIASTOLIC BLOOD PRESSURE: 79 MMHG

## 2025-07-24 DIAGNOSIS — G89.29 CHRONIC ABDOMINAL PAIN: ICD-10-CM

## 2025-07-24 DIAGNOSIS — E11.21 TYPE 2 DIABETES MELLITUS WITH NEPHROPATHY (HCC): ICD-10-CM

## 2025-07-24 DIAGNOSIS — M54.50 CHRONIC BILATERAL LOW BACK PAIN WITHOUT SCIATICA: ICD-10-CM

## 2025-07-24 DIAGNOSIS — I10 ESSENTIAL HYPERTENSION, BENIGN: ICD-10-CM

## 2025-07-24 DIAGNOSIS — M05.751 RHEUMATOID ARTHRITIS INVOLVING RIGHT HIP WITH POSITIVE RHEUMATOID FACTOR (HCC): Primary | ICD-10-CM

## 2025-07-24 DIAGNOSIS — R10.9 CHRONIC ABDOMINAL PAIN: ICD-10-CM

## 2025-07-24 DIAGNOSIS — F33.41 RECURRENT MAJOR DEPRESSIVE DISORDER, IN PARTIAL REMISSION: ICD-10-CM

## 2025-07-24 DIAGNOSIS — G89.29 CHRONIC BILATERAL LOW BACK PAIN WITHOUT SCIATICA: ICD-10-CM

## 2025-07-24 DIAGNOSIS — E55.9 VITAMIN D DEFICIENCY: ICD-10-CM

## 2025-07-24 LAB
25(OH)D3 SERPL-MCNC: 41.3 NG/ML (ref 30–100)
EST. AVERAGE GLUCOSE BLD GHB EST-MCNC: 200 MG/DL
HBA1C MFR BLD: 8.6 % (ref 4.2–5.6)

## 2025-07-24 PROCEDURE — 3074F SYST BP LT 130 MM HG: CPT | Performed by: FAMILY MEDICINE

## 2025-07-24 PROCEDURE — 1124F ACP DISCUSS-NO DSCNMKR DOCD: CPT | Performed by: FAMILY MEDICINE

## 2025-07-24 PROCEDURE — 82306 VITAMIN D 25 HYDROXY: CPT

## 2025-07-24 PROCEDURE — 3046F HEMOGLOBIN A1C LEVEL >9.0%: CPT | Performed by: FAMILY MEDICINE

## 2025-07-24 PROCEDURE — 3078F DIAST BP <80 MM HG: CPT | Performed by: FAMILY MEDICINE

## 2025-07-24 PROCEDURE — 83036 HEMOGLOBIN GLYCOSYLATED A1C: CPT

## 2025-07-24 PROCEDURE — 99214 OFFICE O/P EST MOD 30 MIN: CPT | Performed by: FAMILY MEDICINE

## 2025-07-24 PROCEDURE — 36415 COLL VENOUS BLD VENIPUNCTURE: CPT

## 2025-07-24 RX ORDER — HYDROCODONE BITARTRATE AND ACETAMINOPHEN 5; 325 MG/1; MG/1
1 TABLET ORAL EVERY 6 HOURS PRN
Qty: 30 TABLET | Refills: 0 | Status: SHIPPED | OUTPATIENT
Start: 2025-07-24 | End: 2025-08-23

## 2025-07-24 RX ORDER — VENLAFAXINE HYDROCHLORIDE 37.5 MG/1
37.5 CAPSULE, EXTENDED RELEASE ORAL DAILY
Qty: 90 CAPSULE | Refills: 3 | Status: SHIPPED | OUTPATIENT
Start: 2025-07-24

## 2025-07-24 RX ORDER — HYDROCODONE BITARTRATE AND ACETAMINOPHEN 5; 325 MG/1; MG/1
1 TABLET ORAL EVERY 6 HOURS PRN
COMMUNITY
End: 2025-07-24 | Stop reason: SDUPTHER

## 2025-07-24 RX ORDER — ERGOCALCIFEROL 1.25 MG/1
50000 CAPSULE, LIQUID FILLED ORAL WEEKLY
Qty: 12 CAPSULE | Refills: 4 | Status: SHIPPED | OUTPATIENT
Start: 2025-07-24

## 2025-07-24 RX ORDER — BISOPROLOL FUMARATE AND HYDROCHLOROTHIAZIDE 2.5; 6.25 MG/1; MG/1
1 TABLET ORAL DAILY
Qty: 90 TABLET | Refills: 3 | Status: SHIPPED | OUTPATIENT
Start: 2025-07-24

## 2025-07-24 ASSESSMENT — PATIENT HEALTH QUESTIONNAIRE - PHQ9
SUM OF ALL RESPONSES TO PHQ QUESTIONS 1-9: 0
SUM OF ALL RESPONSES TO PHQ QUESTIONS 1-9: 0
2. FEELING DOWN, DEPRESSED OR HOPELESS: NOT AT ALL
1. LITTLE INTEREST OR PLEASURE IN DOING THINGS: NOT AT ALL
SUM OF ALL RESPONSES TO PHQ QUESTIONS 1-9: 0
SUM OF ALL RESPONSES TO PHQ QUESTIONS 1-9: 0

## 2025-07-24 NOTE — PROGRESS NOTES
Have you been to the ER, urgent care clinic since your last visit?  Hospitalized since your last visit?   NO    Have you seen or consulted any other health care providers outside our system since your last visit?   NO      “Have you had a diabetic eye exam?”    No    Date of last diabetic eye exam: 5/29/2017

## 2025-07-24 NOTE — PROGRESS NOTES
Linda Espinal is a 65 y.o.  female and presents with    Chief Complaint   Patient presents with    Diabetes    Cholesterol Problem    Results     Discuss dexa scan results         Subjective:  Mrs. Espinal is following up for fall after ER visit.  She needs refill of pain medication and diabetes medication.      She c/o abdominal pain which is chronic.  She reports it feels like someone is kicking her in the stomach but it is unchanged from previous.  She has not had pain medication for the past 2 weeks.  There has been disconnect between pharmacy.    She had a fall 2 days ago and went to the ER.     She was prescribed hydrocodone #12.  Pain radiating in right lower back.       She had abdominal pain 1 week ago and went to the ER.    Depression Review:  Patient is seen for followup of depression. Treatment includes effexor and no other therapies.   Ongoing symptoms include depressed mood, irritability, anhedonia, insomnia, fatigue, feelings of worthlessness/guilt, difficulty concentrating and hopelessness.  She denies hypersomnia, impaired memory and recurrent thoughts of death.   She experiences the following side effects from the treatment: none.     Anxiety Review:  Patient is seen for sleep disturbance. Current treatment includes doxepin and no other therapies.   Ongoing symptoms include: insomnia.   Patient denies: palpitations, sweating, chest pain, shortness of breath, dizziness, paresthesias, racing thoughts, psychomotor agitation, feelings of losing control, difficulty concentrating, suicidal ideation.   Reported side effects from the treatment: none.      ROS   General ROS: negative for - fatigue, fever, positive for weight loss  Respiratory ROS: positive for - intermittent cough  negative for - shortness of breath  Cardiovascular ROS: no chest pain or dyspnea on exertion  Gastrointestinal ROS: no abdominal pain, change in bowel habits, or black or bloody stools  Genito-Urinary ROS:

## 2025-07-25 ENCOUNTER — RESULTS FOLLOW-UP (OUTPATIENT)
Facility: CLINIC | Age: 65
End: 2025-07-25

## 2025-07-25 DIAGNOSIS — E11.21 TYPE 2 DIABETES MELLITUS WITH NEPHROPATHY (HCC): Primary | ICD-10-CM

## 2025-09-03 ENCOUNTER — OFFICE VISIT (OUTPATIENT)
Facility: CLINIC | Age: 65
End: 2025-09-03
Payer: COMMERCIAL

## 2025-09-03 VITALS
TEMPERATURE: 97.7 F | SYSTOLIC BLOOD PRESSURE: 126 MMHG | DIASTOLIC BLOOD PRESSURE: 79 MMHG | BODY MASS INDEX: 25.99 KG/M2 | OXYGEN SATURATION: 98 % | HEIGHT: 64 IN | RESPIRATION RATE: 20 BRPM | WEIGHT: 152.2 LBS | HEART RATE: 79 BPM

## 2025-09-03 DIAGNOSIS — G89.29 CHRONIC BILATERAL LOW BACK PAIN WITHOUT SCIATICA: ICD-10-CM

## 2025-09-03 DIAGNOSIS — G89.29 CHRONIC ABDOMINAL PAIN: Primary | ICD-10-CM

## 2025-09-03 DIAGNOSIS — F33.41 RECURRENT MAJOR DEPRESSIVE DISORDER, IN PARTIAL REMISSION: ICD-10-CM

## 2025-09-03 DIAGNOSIS — M05.751 RHEUMATOID ARTHRITIS INVOLVING RIGHT HIP WITH POSITIVE RHEUMATOID FACTOR (HCC): ICD-10-CM

## 2025-09-03 DIAGNOSIS — E11.21 TYPE 2 DIABETES MELLITUS WITH NEPHROPATHY (HCC): ICD-10-CM

## 2025-09-03 DIAGNOSIS — R10.9 CHRONIC ABDOMINAL PAIN: Primary | ICD-10-CM

## 2025-09-03 DIAGNOSIS — M54.50 CHRONIC BILATERAL LOW BACK PAIN WITHOUT SCIATICA: ICD-10-CM

## 2025-09-03 DIAGNOSIS — G44.209 TENSION HEADACHE: ICD-10-CM

## 2025-09-03 DIAGNOSIS — I10 ESSENTIAL HYPERTENSION, BENIGN: ICD-10-CM

## 2025-09-03 PROCEDURE — 3052F HG A1C>EQUAL 8.0%<EQUAL 9.0%: CPT | Performed by: FAMILY MEDICINE

## 2025-09-03 PROCEDURE — 99214 OFFICE O/P EST MOD 30 MIN: CPT | Performed by: FAMILY MEDICINE

## 2025-09-03 PROCEDURE — 3078F DIAST BP <80 MM HG: CPT | Performed by: FAMILY MEDICINE

## 2025-09-03 PROCEDURE — 3074F SYST BP LT 130 MM HG: CPT | Performed by: FAMILY MEDICINE

## 2025-09-03 PROCEDURE — 1124F ACP DISCUSS-NO DSCNMKR DOCD: CPT | Performed by: FAMILY MEDICINE

## 2025-09-03 RX ORDER — CYCLOBENZAPRINE HCL 10 MG
5 TABLET ORAL 3 TIMES DAILY PRN
Qty: 90 TABLET | Refills: 3 | Status: SHIPPED | OUTPATIENT
Start: 2025-09-03

## 2025-09-03 RX ORDER — HYDROCODONE BITARTRATE AND ACETAMINOPHEN 5; 325 MG/1; MG/1
1 TABLET ORAL EVERY 6 HOURS PRN
Qty: 30 TABLET | Refills: 0 | Status: SHIPPED | OUTPATIENT
Start: 2025-09-03 | End: 2025-10-03

## 2025-09-03 RX ORDER — CYCLOBENZAPRINE HCL 10 MG
5 TABLET ORAL 3 TIMES DAILY PRN
COMMUNITY
Start: 2025-08-12 | End: 2025-09-03 | Stop reason: SDUPTHER

## 2025-09-03 RX ORDER — DOXEPIN HYDROCHLORIDE 50 MG/1
50 CAPSULE ORAL EVERY EVENING
Qty: 90 CAPSULE | Refills: 3 | Status: SHIPPED | OUTPATIENT
Start: 2025-09-03

## 2025-09-03 RX ORDER — INSULIN GLARGINE 100 [IU]/ML
20 INJECTION, SOLUTION SUBCUTANEOUS NIGHTLY
Qty: 15 ADJUSTABLE DOSE PRE-FILLED PEN SYRINGE | Refills: 3 | Status: SHIPPED | OUTPATIENT
Start: 2025-09-03

## 2025-09-03 RX ORDER — METHOTREXATE 2.5 MG/1
5 TABLET ORAL WEEKLY
COMMUNITY
End: 2025-09-03 | Stop reason: SDUPTHER

## 2025-09-03 RX ORDER — METHOTREXATE 2.5 MG/1
5 TABLET ORAL WEEKLY
Qty: 48 TABLET | Refills: 1 | Status: SHIPPED | OUTPATIENT
Start: 2025-09-03

## 2025-09-03 RX ORDER — AMLODIPINE BESYLATE 10 MG/1
10 TABLET ORAL DAILY
Qty: 90 TABLET | Refills: 3 | Status: SHIPPED | OUTPATIENT
Start: 2025-09-03

## 2025-09-03 ASSESSMENT — PATIENT HEALTH QUESTIONNAIRE - PHQ9
SUM OF ALL RESPONSES TO PHQ QUESTIONS 1-9: 0
10. IF YOU CHECKED OFF ANY PROBLEMS, HOW DIFFICULT HAVE THESE PROBLEMS MADE IT FOR YOU TO DO YOUR WORK, TAKE CARE OF THINGS AT HOME, OR GET ALONG WITH OTHER PEOPLE: NOT DIFFICULT AT ALL
1. LITTLE INTEREST OR PLEASURE IN DOING THINGS: NOT AT ALL
5. POOR APPETITE OR OVEREATING: NOT AT ALL
3. TROUBLE FALLING OR STAYING ASLEEP: NOT AT ALL
SUM OF ALL RESPONSES TO PHQ QUESTIONS 1-9: 0
SUM OF ALL RESPONSES TO PHQ QUESTIONS 1-9: 0
8. MOVING OR SPEAKING SO SLOWLY THAT OTHER PEOPLE COULD HAVE NOTICED. OR THE OPPOSITE, BEING SO FIGETY OR RESTLESS THAT YOU HAVE BEEN MOVING AROUND A LOT MORE THAN USUAL: NOT AT ALL
9. THOUGHTS THAT YOU WOULD BE BETTER OFF DEAD, OR OF HURTING YOURSELF: NOT AT ALL
2. FEELING DOWN, DEPRESSED OR HOPELESS: NOT AT ALL
4. FEELING TIRED OR HAVING LITTLE ENERGY: NOT AT ALL
SUM OF ALL RESPONSES TO PHQ QUESTIONS 1-9: 0
6. FEELING BAD ABOUT YOURSELF - OR THAT YOU ARE A FAILURE OR HAVE LET YOURSELF OR YOUR FAMILY DOWN: NOT AT ALL
7. TROUBLE CONCENTRATING ON THINGS, SUCH AS READING THE NEWSPAPER OR WATCHING TELEVISION: NOT AT ALL

## (undated) DEVICE — SUTURE VCRL SZ 3-0 L27IN ABSRB UD L26MM SH 1/2 CIR J416H

## (undated) DEVICE — Device

## (undated) DEVICE — INSULATED BLADE ELECTRODE: Brand: EDGE

## (undated) DEVICE — SHEAR HARMONIC ACET 5MMX36CM -- ACE PLUS

## (undated) DEVICE — SUTURE PDS II SZ 1 L27IN ABSRB VLT CT-1 L36MM 1/2 CIR Z341H

## (undated) DEVICE — STERILE POLYISOPRENE POWDER-FREE SURGICAL GLOVES: Brand: PROTEXIS

## (undated) DEVICE — SUTURE PDS II SZ 1 L36IN ABSRB VLT L48MM CTX 1/2 CIR Z371T

## (undated) DEVICE — SOL IRR NACL 0.9% 500ML POUR --

## (undated) DEVICE — SUTURE MCRYL SZ 4-0 L18IN ABSRB UD L19MM PS-2 3/8 CIR PRIM Y496G

## (undated) DEVICE — KENDALL 500 SERIES DIAPHORETIC FOAM MONITORING ELECTRODE - TEAR DROP SHAPE ( 30/PK): Brand: KENDALL

## (undated) DEVICE — MEDI-VAC NON-CONDUCTIVE SUCTION TUBING: Brand: CARDINAL HEALTH

## (undated) DEVICE — BASIN EMESIS 500CC ROSE 250/CS 60/PLT: Brand: MEDEGEN MEDICAL PRODUCTS, LLC

## (undated) DEVICE — FORCEPS BX L240CM JAW DIA2.8MM L CAP W/ NDL MIC MESH TOOTH

## (undated) DEVICE — STAPLER INT L75MM CUT LN L73MM STPL LN L77MM BLU B FRM 8

## (undated) DEVICE — SPONGE LAP 18X18IN STRL -- 5/PK

## (undated) DEVICE — SPONGE GZ W4XL4IN COT 12 PLY TYP VII WVN C FLD DSGN

## (undated) DEVICE — STERILE LATEX POWDER-FREE SURGICAL GLOVESWITH NITRILE COATING: Brand: PROTEXIS

## (undated) DEVICE — DEPAUL MAJOR PROCEDURE PACK: Brand: MEDLINE INDUSTRIES, INC.

## (undated) DEVICE — SUTURE PLN GUT SZ 2-0 L27IN ABSRB YELLOWISH TAN L70MM XLH 53T

## (undated) DEVICE — STERILE POLYISOPRENE POWDER-FREE SURGICAL GLOVES WITH EMOLLIENT COATING: Brand: PROTEXIS

## (undated) DEVICE — PREP SKN CHLRAPRP 26ML TNT -- CONVERT TO ITEM 373320

## (undated) DEVICE — STAPLER SKIN H3.9MM WIRE DIA0.58MM CRWN 6.9MM 35 STPL FIX

## (undated) DEVICE — SUTURE VCRL SZ 2-0 L12X18IN ABSRB UD POLYGLACTIN 910 BRAID J911T

## (undated) DEVICE — DEVICE INFL 60ML 12ATM CONVENIENT LOK REL HNDL HI PRSS FLX

## (undated) DEVICE — SOLUTION IRRIG 1000ML H2O STRL BLT

## (undated) DEVICE — TELFA NON-ADHERENT PADS PREPAK: Brand: TELFA

## (undated) DEVICE — RELOAD STPL L75MM OPN H3.8MM CLS 1.5MM WIRE DIA0.2MM REG

## (undated) DEVICE — CURVED, LARGE JAW, OPEN SEALER/DIVIDER NANO-COATED: Brand: LIGASURE IMPACT

## (undated) DEVICE — SUTURE VCRL SZ 0 L18IN ABSRB UD POLYGLACTIN 910 BRAID TIE J912G

## (undated) DEVICE — PAD PREP M ISO ALC 2 PLY NONWOVEN SFT ABSRB

## (undated) DEVICE — YANKAUER,FLEXIBLE HANDLE,REGLR CAPACITY: Brand: MEDLINE INDUSTRIES, INC.

## (undated) DEVICE — (D)STRIP SKN CLSR 0.5X4IN WHT --

## (undated) DEVICE — GOWN,SIRUS,FABRNF,XL,20/CS: Brand: MEDLINE

## (undated) DEVICE — TOWEL SURG W16XL26IN BLU NONFENESTRATED DLX ST 2 PER PK

## (undated) DEVICE — SUTURE VCRL SZ 2-0 L27IN ABSRB UD L26MM SH 1/2 CIR J417H

## (undated) DEVICE — TRAY PREP DRY W/ PREM GLV 2 APPL 6 SPNG 2 UNDPD 1 OVERWRAP

## (undated) DEVICE — INTENDED FOR TISSUE SEPARATION, AND OTHER PROCEDURES THAT REQUIRE A SHARP SURGICAL BLADE TO PUNCTURE OR CUT.: Brand: BARD-PARKER ® CARBON RIB-BACK BLADES

## (undated) DEVICE — LIGHT HANDLE: Brand: DEVON

## (undated) DEVICE — YANKAUER,BULB TIP,W/O VENT,RIGID,STERILE: Brand: MEDLINE

## (undated) DEVICE — KENDALL SCD EXPRESS SLEEVES, KNEE LENGTH, MEDIUM: Brand: KENDALL SCD

## (undated) DEVICE — SLEEVE COMPR THGH LEN MED TEAR AWAY GARM SCD EXPRESS [DVT30] [MEDTRONIC COVIDIEN KENDALL]

## (undated) DEVICE — SYR 10ML CTRL LR LCK NSAF LF --

## (undated) DEVICE — TISSUE FUSION OPEN INSTRUMENT: Brand: LIGASURE ATLAS

## (undated) DEVICE — TRAY CATH 16F URIN MTR LTX -- CONVERT TO ITEM 363111

## (undated) DEVICE — SYR 50ML SLIP TIP NSAF LF STRL --

## (undated) DEVICE — CURVED, LARGE JAW, OPEN SEALER/DIVIDER: Brand: LIGASURE IMPACT

## (undated) DEVICE — DRAPE THER FLUID WARMING 66X44 IN FLAT SLUSH DBL DISC ORS

## (undated) DEVICE — SUTURE VCRL SZ 0 L18IN ABSRB VLT L26MM CT-2 1/2 CIR J727D

## (undated) DEVICE — ELECTRODE BLDE L4IN NONINSULATED EDGE

## (undated) DEVICE — NEEDLE HYPO 25GA L1.5IN BLU POLYPR HUB S STL REG BVL STR

## (undated) DEVICE — SUTURE ETHLN SZ 2-0 L18IN NONABSORBABLE BLK L26MM PS 3/8 585H

## (undated) DEVICE — KIT COLON W/ 1.1OZ LUB AND 2 END

## (undated) DEVICE — FLEX ADVANTAGE 1500CC: Brand: FLEX ADVANTAGE

## (undated) DEVICE — HEX-LOCKING BLADE ELECTRODE: Brand: EDGE

## (undated) DEVICE — SOLUTION IV 1000ML 0.9% SOD CHL

## (undated) DEVICE — INTENDED FOR TISSUE SEPARATION, AND OTHER PROCEDURES THAT REQUIRE A SHARP SURGICAL BLADE TO PUNCTURE OR CUT.: Brand: BARD-PARKER ® STAINLESS STEEL BLADES

## (undated) DEVICE — BITE BLK ENDOSCP AD 54FR GRN POLYETH ENDOSCP W STRP SLD

## (undated) DEVICE — SUTURE VCRL SZ 3-0 L18IN ABSRB UD L26MM SH 1/2 CIR J864D

## (undated) DEVICE — CONTAINER PREFIL FRMLN 15ML --